# Patient Record
Sex: MALE | Race: WHITE | NOT HISPANIC OR LATINO | URBAN - METROPOLITAN AREA
[De-identification: names, ages, dates, MRNs, and addresses within clinical notes are randomized per-mention and may not be internally consistent; named-entity substitution may affect disease eponyms.]

---

## 2017-01-10 ENCOUNTER — OUTPATIENT (OUTPATIENT)
Dept: OUTPATIENT SERVICES | Facility: HOSPITAL | Age: 82
LOS: 1 days | End: 2017-01-10
Payer: MEDICARE

## 2017-01-10 ENCOUNTER — APPOINTMENT (OUTPATIENT)
Dept: RADIOLOGY | Facility: HOSPITAL | Age: 82
End: 2017-01-10

## 2017-01-10 PROCEDURE — 71046 X-RAY EXAM CHEST 2 VIEWS: CPT

## 2017-01-20 ENCOUNTER — APPOINTMENT (OUTPATIENT)
Dept: CT IMAGING | Facility: HOSPITAL | Age: 82
End: 2017-01-20

## 2017-01-20 ENCOUNTER — OUTPATIENT (OUTPATIENT)
Dept: OUTPATIENT SERVICES | Facility: HOSPITAL | Age: 82
LOS: 1 days | End: 2017-01-20
Payer: MEDICARE

## 2017-01-20 PROCEDURE — 74176 CT ABD & PELVIS W/O CONTRAST: CPT

## 2017-02-07 ENCOUNTER — APPOINTMENT (OUTPATIENT)
Dept: ULTRASOUND IMAGING | Facility: HOSPITAL | Age: 82
End: 2017-02-07

## 2017-02-07 ENCOUNTER — OUTPATIENT (OUTPATIENT)
Dept: OUTPATIENT SERVICES | Facility: HOSPITAL | Age: 82
LOS: 1 days | End: 2017-02-07
Payer: MEDICARE

## 2017-02-07 PROCEDURE — 76775 US EXAM ABDO BACK WALL LIM: CPT

## 2017-02-24 ENCOUNTER — OUTPATIENT (OUTPATIENT)
Dept: OUTPATIENT SERVICES | Facility: HOSPITAL | Age: 82
LOS: 1 days | End: 2017-02-24
Payer: MEDICARE

## 2017-02-24 ENCOUNTER — APPOINTMENT (OUTPATIENT)
Dept: MRI IMAGING | Facility: HOSPITAL | Age: 82
End: 2017-02-24

## 2017-02-24 PROCEDURE — 70551 MRI BRAIN STEM W/O DYE: CPT

## 2017-03-01 ENCOUNTER — APPOINTMENT (OUTPATIENT)
Dept: RADIOLOGY | Facility: HOSPITAL | Age: 82
End: 2017-03-01

## 2017-03-01 ENCOUNTER — OUTPATIENT (OUTPATIENT)
Dept: OUTPATIENT SERVICES | Facility: HOSPITAL | Age: 82
LOS: 1 days | End: 2017-03-01
Payer: MEDICARE

## 2017-03-01 ENCOUNTER — APPOINTMENT (OUTPATIENT)
Dept: MRI IMAGING | Facility: HOSPITAL | Age: 82
End: 2017-03-01

## 2017-03-01 PROCEDURE — 72141 MRI NECK SPINE W/O DYE: CPT

## 2017-03-01 PROCEDURE — 73562 X-RAY EXAM OF KNEE 3: CPT

## 2017-03-07 ENCOUNTER — EMERGENCY (EMERGENCY)
Facility: HOSPITAL | Age: 82
LOS: 1 days | Discharge: HOME | End: 2017-03-07
Admitting: EMERGENCY MEDICINE
Payer: MEDICARE

## 2017-03-07 PROCEDURE — 71101 X-RAY EXAM UNILAT RIBS/CHEST: CPT | Mod: 26

## 2017-03-07 PROCEDURE — 99283 EMERGENCY DEPT VISIT LOW MDM: CPT | Mod: 25

## 2017-03-07 PROCEDURE — 71101 X-RAY EXAM UNILAT RIBS/CHEST: CPT

## 2017-03-07 PROCEDURE — 99284 EMERGENCY DEPT VISIT MOD MDM: CPT

## 2017-03-15 ENCOUNTER — EMERGENCY (EMERGENCY)
Facility: HOSPITAL | Age: 82
LOS: 1 days | Discharge: HOME | End: 2017-03-15
Admitting: EMERGENCY MEDICINE
Payer: MEDICARE

## 2017-03-15 PROCEDURE — 74176 CT ABD & PELVIS W/O CONTRAST: CPT | Mod: 26

## 2017-03-15 PROCEDURE — 99284 EMERGENCY DEPT VISIT MOD MDM: CPT

## 2017-03-16 PROCEDURE — 80048 BASIC METABOLIC PNL TOTAL CA: CPT

## 2017-03-16 PROCEDURE — 85027 COMPLETE CBC AUTOMATED: CPT

## 2017-03-16 PROCEDURE — 82150 ASSAY OF AMYLASE: CPT

## 2017-03-16 PROCEDURE — 85610 PROTHROMBIN TIME: CPT

## 2017-03-16 PROCEDURE — 85730 THROMBOPLASTIN TIME PARTIAL: CPT

## 2017-03-16 PROCEDURE — 80076 HEPATIC FUNCTION PANEL: CPT

## 2017-03-16 PROCEDURE — 74176 CT ABD & PELVIS W/O CONTRAST: CPT

## 2017-03-16 PROCEDURE — 99284 EMERGENCY DEPT VISIT MOD MDM: CPT | Mod: 25

## 2017-03-16 PROCEDURE — 96365 THER/PROPH/DIAG IV INF INIT: CPT

## 2017-03-16 PROCEDURE — 83690 ASSAY OF LIPASE: CPT

## 2017-03-16 PROCEDURE — 96375 TX/PRO/DX INJ NEW DRUG ADDON: CPT

## 2017-03-28 ENCOUNTER — APPOINTMENT (OUTPATIENT)
Dept: RHEUMATOLOGY | Facility: CLINIC | Age: 82
End: 2017-03-28

## 2017-03-30 ENCOUNTER — RESULT REVIEW (OUTPATIENT)
Age: 82
End: 2017-03-30

## 2017-03-31 ENCOUNTER — OUTPATIENT (OUTPATIENT)
Dept: OUTPATIENT SERVICES | Facility: HOSPITAL | Age: 82
LOS: 1 days | End: 2017-03-31
Payer: MEDICARE

## 2017-03-31 DIAGNOSIS — R10.9 UNSPECIFIED ABDOMINAL PAIN: ICD-10-CM

## 2017-03-31 PROCEDURE — 88305 TISSUE EXAM BY PATHOLOGIST: CPT | Mod: 26

## 2017-03-31 PROCEDURE — 43239 EGD BIOPSY SINGLE/MULTIPLE: CPT | Mod: XS

## 2017-03-31 PROCEDURE — 43237 ENDOSCOPIC US EXAM ESOPH: CPT

## 2017-03-31 PROCEDURE — C1726: CPT

## 2017-03-31 PROCEDURE — 88305 TISSUE EXAM BY PATHOLOGIST: CPT

## 2017-04-03 LAB — SURGICAL PATHOLOGY STUDY: SIGNIFICANT CHANGE UP

## 2017-04-04 ENCOUNTER — APPOINTMENT (OUTPATIENT)
Dept: SURGICAL ONCOLOGY | Facility: CLINIC | Age: 82
End: 2017-04-04

## 2017-04-04 ENCOUNTER — FORM ENCOUNTER (OUTPATIENT)
Age: 82
End: 2017-04-04

## 2017-04-04 VITALS
HEART RATE: 73 BPM | DIASTOLIC BLOOD PRESSURE: 73 MMHG | SYSTOLIC BLOOD PRESSURE: 136 MMHG | HEIGHT: 65 IN | WEIGHT: 143 LBS | RESPIRATION RATE: 15 BRPM | BODY MASS INDEX: 23.82 KG/M2

## 2017-04-05 ENCOUNTER — APPOINTMENT (OUTPATIENT)
Dept: NUCLEAR MEDICINE | Facility: IMAGING CENTER | Age: 82
End: 2017-04-05

## 2017-04-05 ENCOUNTER — OUTPATIENT (OUTPATIENT)
Dept: OUTPATIENT SERVICES | Facility: HOSPITAL | Age: 82
LOS: 1 days | End: 2017-04-05
Payer: MEDICARE

## 2017-04-05 DIAGNOSIS — C15.5 MALIGNANT NEOPLASM OF LOWER THIRD OF ESOPHAGUS: ICD-10-CM

## 2017-04-05 PROCEDURE — 78815 PET IMAGE W/CT SKULL-THIGH: CPT

## 2017-04-05 PROCEDURE — A9552: CPT

## 2017-04-11 ENCOUNTER — APPOINTMENT (OUTPATIENT)
Dept: RHEUMATOLOGY | Facility: CLINIC | Age: 82
End: 2017-04-11

## 2017-05-09 ENCOUNTER — APPOINTMENT (OUTPATIENT)
Dept: RHEUMATOLOGY | Facility: CLINIC | Age: 82
End: 2017-05-09

## 2017-06-20 ENCOUNTER — APPOINTMENT (OUTPATIENT)
Dept: SURGICAL ONCOLOGY | Facility: CLINIC | Age: 82
End: 2017-06-20

## 2017-06-20 VITALS
SYSTOLIC BLOOD PRESSURE: 125 MMHG | HEART RATE: 70 BPM | RESPIRATION RATE: 15 BRPM | WEIGHT: 143 LBS | BODY MASS INDEX: 23.82 KG/M2 | DIASTOLIC BLOOD PRESSURE: 71 MMHG | HEIGHT: 65 IN

## 2017-06-20 RX ORDER — PREDNISONE 5 MG/1
5 TABLET ORAL
Qty: 60 | Refills: 0 | Status: ACTIVE | COMMUNITY
Start: 2017-05-05

## 2017-06-20 RX ORDER — EZETIMIBE 10 MG/1
10 TABLET ORAL
Qty: 30 | Refills: 0 | Status: ACTIVE | COMMUNITY
Start: 2017-05-09

## 2017-06-20 RX ORDER — SIMVASTATIN 40 MG/1
40 TABLET, FILM COATED ORAL
Qty: 30 | Refills: 0 | Status: ACTIVE | COMMUNITY
Start: 2017-05-09

## 2017-06-21 ENCOUNTER — FORM ENCOUNTER (OUTPATIENT)
Age: 82
End: 2017-06-21

## 2017-06-22 ENCOUNTER — OUTPATIENT (OUTPATIENT)
Dept: OUTPATIENT SERVICES | Facility: HOSPITAL | Age: 82
LOS: 1 days | End: 2017-06-22
Payer: MEDICARE

## 2017-06-22 ENCOUNTER — APPOINTMENT (OUTPATIENT)
Dept: CARDIOLOGY | Facility: CLINIC | Age: 82
End: 2017-06-22

## 2017-06-22 DIAGNOSIS — R07.89 OTHER CHEST PAIN: ICD-10-CM

## 2017-06-22 DIAGNOSIS — R07.9 CHEST PAIN, UNSPECIFIED: ICD-10-CM

## 2017-06-22 PROCEDURE — 71250 CT THORAX DX C-: CPT

## 2017-06-22 PROCEDURE — 75574 CT ANGIO HRT W/3D IMAGE: CPT

## 2017-06-22 PROCEDURE — 75574 CT ANGIO HRT W/3D IMAGE: CPT | Mod: 26

## 2017-06-22 PROCEDURE — 71250 CT THORAX DX C-: CPT | Mod: 26,59

## 2017-06-29 ENCOUNTER — APPOINTMENT (OUTPATIENT)
Dept: THORACIC SURGERY | Facility: CLINIC | Age: 82
End: 2017-06-29

## 2017-06-29 VITALS
OXYGEN SATURATION: 98 % | HEART RATE: 77 BPM | SYSTOLIC BLOOD PRESSURE: 106 MMHG | WEIGHT: 140 LBS | RESPIRATION RATE: 16 BRPM | HEIGHT: 65 IN | BODY MASS INDEX: 23.32 KG/M2 | DIASTOLIC BLOOD PRESSURE: 63 MMHG

## 2017-06-29 DIAGNOSIS — I10 ESSENTIAL (PRIMARY) HYPERTENSION: ICD-10-CM

## 2017-06-29 DIAGNOSIS — Z87.438 PERSONAL HISTORY OF OTHER DISEASES OF MALE GENITAL ORGANS: ICD-10-CM

## 2017-06-29 DIAGNOSIS — M06.9 RHEUMATOID ARTHRITIS, UNSPECIFIED: ICD-10-CM

## 2017-06-29 DIAGNOSIS — Z82.49 FAMILY HISTORY OF ISCHEMIC HEART DISEASE AND OTHER DISEASES OF THE CIRCULATORY SYSTEM: ICD-10-CM

## 2017-06-29 DIAGNOSIS — Z85.828 OTHER SPECIFIED POSTPROCEDURAL STATES: ICD-10-CM

## 2017-06-29 DIAGNOSIS — Z80.3 FAMILY HISTORY OF MALIGNANT NEOPLASM OF BREAST: ICD-10-CM

## 2017-06-29 DIAGNOSIS — Z98.890 OTHER SPECIFIED POSTPROCEDURAL STATES: ICD-10-CM

## 2017-06-29 DIAGNOSIS — M19.90 UNSPECIFIED OSTEOARTHRITIS, UNSPECIFIED SITE: ICD-10-CM

## 2017-06-30 ENCOUNTER — TRANSCRIPTION ENCOUNTER (OUTPATIENT)
Age: 82
End: 2017-06-30

## 2017-06-30 ENCOUNTER — OUTPATIENT (OUTPATIENT)
Dept: OUTPATIENT SERVICES | Facility: HOSPITAL | Age: 82
LOS: 1 days | End: 2017-06-30
Payer: MEDICARE

## 2017-06-30 VITALS
DIASTOLIC BLOOD PRESSURE: 74 MMHG | HEIGHT: 65 IN | WEIGHT: 139.99 LBS | SYSTOLIC BLOOD PRESSURE: 161 MMHG | RESPIRATION RATE: 14 BRPM | OXYGEN SATURATION: 98 % | TEMPERATURE: 98 F | HEART RATE: 83 BPM

## 2017-06-30 DIAGNOSIS — Z96.649 PRESENCE OF UNSPECIFIED ARTIFICIAL HIP JOINT: Chronic | ICD-10-CM

## 2017-06-30 DIAGNOSIS — R93.1 ABNORMAL FINDINGS ON DIAGNOSTIC IMAGING OF HEART AND CORONARY CIRCULATION: ICD-10-CM

## 2017-06-30 DIAGNOSIS — Z98.890 OTHER SPECIFIED POSTPROCEDURAL STATES: Chronic | ICD-10-CM

## 2017-06-30 DIAGNOSIS — Z95.5 PRESENCE OF CORONARY ANGIOPLASTY IMPLANT AND GRAFT: Chronic | ICD-10-CM

## 2017-06-30 DIAGNOSIS — M12.9 ARTHROPATHY, UNSPECIFIED: Chronic | ICD-10-CM

## 2017-06-30 LAB
ALBUMIN SERPL ELPH-MCNC: 3.5 G/DL — SIGNIFICANT CHANGE UP (ref 3.3–5)
ALP SERPL-CCNC: 233 U/L — HIGH (ref 40–120)
ALT FLD-CCNC: 64 U/L RC — HIGH (ref 10–45)
ANION GAP SERPL CALC-SCNC: 9 MMOL/L — SIGNIFICANT CHANGE UP (ref 5–17)
AST SERPL-CCNC: 51 U/L — HIGH (ref 10–40)
BILIRUB SERPL-MCNC: 0.2 MG/DL — SIGNIFICANT CHANGE UP (ref 0.2–1.2)
BUN SERPL-MCNC: 21 MG/DL — SIGNIFICANT CHANGE UP (ref 7–23)
CALCIUM SERPL-MCNC: 9.9 MG/DL — SIGNIFICANT CHANGE UP (ref 8.4–10.5)
CHLORIDE SERPL-SCNC: 108 MMOL/L — SIGNIFICANT CHANGE UP (ref 96–108)
CO2 SERPL-SCNC: 24 MMOL/L — SIGNIFICANT CHANGE UP (ref 22–31)
CREAT SERPL-MCNC: 0.76 MG/DL — SIGNIFICANT CHANGE UP (ref 0.5–1.3)
GLUCOSE SERPL-MCNC: 96 MG/DL — SIGNIFICANT CHANGE UP (ref 70–99)
HBA1C BLD-MCNC: 5.2 % — SIGNIFICANT CHANGE UP (ref 4–5.6)
HCT VFR BLD CALC: 31.4 % — LOW (ref 39–50)
HGB BLD-MCNC: 10.5 G/DL — LOW (ref 13–17)
MCHC RBC-ENTMCNC: 33.1 PG — SIGNIFICANT CHANGE UP (ref 27–34)
MCHC RBC-ENTMCNC: 33.4 GM/DL — SIGNIFICANT CHANGE UP (ref 32–36)
MCV RBC AUTO: 99.1 FL — SIGNIFICANT CHANGE UP (ref 80–100)
PLATELET # BLD AUTO: 258 K/UL — SIGNIFICANT CHANGE UP (ref 150–400)
POTASSIUM SERPL-MCNC: 4.5 MMOL/L — SIGNIFICANT CHANGE UP (ref 3.5–5.3)
POTASSIUM SERPL-SCNC: 4.5 MMOL/L — SIGNIFICANT CHANGE UP (ref 3.5–5.3)
PROT SERPL-MCNC: 6.9 G/DL — SIGNIFICANT CHANGE UP (ref 6–8.3)
RBC # BLD: 3.17 M/UL — LOW (ref 4.2–5.8)
RBC # FLD: 18.8 % — HIGH (ref 10.3–14.5)
SODIUM SERPL-SCNC: 141 MMOL/L — SIGNIFICANT CHANGE UP (ref 135–145)
WBC # BLD: 7.4 K/UL — SIGNIFICANT CHANGE UP (ref 3.8–10.5)
WBC # FLD AUTO: 7.4 K/UL — SIGNIFICANT CHANGE UP (ref 3.8–10.5)

## 2017-06-30 PROCEDURE — 93010 ELECTROCARDIOGRAM REPORT: CPT

## 2017-06-30 RX ORDER — CITALOPRAM 10 MG/1
20 TABLET, FILM COATED ORAL DAILY
Qty: 0 | Refills: 0 | Status: DISCONTINUED | OUTPATIENT
Start: 2017-06-30 | End: 2017-07-01

## 2017-06-30 RX ORDER — PANTOPRAZOLE SODIUM 20 MG/1
40 TABLET, DELAYED RELEASE ORAL
Qty: 0 | Refills: 0 | Status: DISCONTINUED | OUTPATIENT
Start: 2017-06-30 | End: 2017-07-01

## 2017-06-30 RX ORDER — ASPIRIN/CALCIUM CARB/MAGNESIUM 324 MG
1 TABLET ORAL
Qty: 0 | Refills: 0 | COMMUNITY
Start: 2017-06-30

## 2017-06-30 RX ORDER — TAMSULOSIN HYDROCHLORIDE 0.4 MG/1
0.4 CAPSULE ORAL AT BEDTIME
Qty: 0 | Refills: 0 | Status: DISCONTINUED | OUTPATIENT
Start: 2017-06-30 | End: 2017-07-01

## 2017-06-30 RX ORDER — SUCRALFATE 1 G
1 TABLET ORAL
Qty: 0 | Refills: 0 | Status: DISCONTINUED | OUTPATIENT
Start: 2017-06-30 | End: 2017-07-01

## 2017-06-30 RX ORDER — LOSARTAN POTASSIUM 100 MG/1
25 TABLET, FILM COATED ORAL DAILY
Qty: 0 | Refills: 0 | Status: DISCONTINUED | OUTPATIENT
Start: 2017-06-30 | End: 2017-07-01

## 2017-06-30 RX ORDER — MONTELUKAST 4 MG/1
10 TABLET, CHEWABLE ORAL DAILY
Qty: 0 | Refills: 0 | Status: DISCONTINUED | OUTPATIENT
Start: 2017-06-30 | End: 2017-07-01

## 2017-06-30 RX ORDER — SODIUM CHLORIDE 9 MG/ML
3 INJECTION INTRAMUSCULAR; INTRAVENOUS; SUBCUTANEOUS EVERY 8 HOURS
Qty: 0 | Refills: 0 | Status: DISCONTINUED | OUTPATIENT
Start: 2017-06-30 | End: 2017-07-01

## 2017-06-30 RX ORDER — CLOPIDOGREL BISULFATE 75 MG/1
1 TABLET, FILM COATED ORAL
Qty: 90 | Refills: 3 | OUTPATIENT
Start: 2017-06-30 | End: 2018-06-24

## 2017-06-30 RX ORDER — ASPIRIN AND DIPYRIDAMOLE 25; 200 MG/1; MG/1
1 CAPSULE, EXTENDED RELEASE ORAL
Qty: 0 | Refills: 0 | Status: DISCONTINUED | OUTPATIENT
Start: 2017-06-30 | End: 2017-06-30

## 2017-06-30 RX ORDER — NYSTATIN 500MM UNIT
400000 POWDER (EA) MISCELLANEOUS
Qty: 0 | Refills: 0 | Status: DISCONTINUED | OUTPATIENT
Start: 2017-06-30 | End: 2017-07-01

## 2017-06-30 RX ORDER — ASPIRIN/CALCIUM CARB/MAGNESIUM 324 MG
81 TABLET ORAL DAILY
Qty: 0 | Refills: 0 | Status: DISCONTINUED | OUTPATIENT
Start: 2017-07-01 | End: 2017-07-01

## 2017-06-30 RX ORDER — AMLODIPINE BESYLATE 2.5 MG/1
5 TABLET ORAL DAILY
Qty: 0 | Refills: 0 | Status: DISCONTINUED | OUTPATIENT
Start: 2017-06-30 | End: 2017-07-01

## 2017-06-30 RX ORDER — FINASTERIDE 5 MG/1
5 TABLET, FILM COATED ORAL DAILY
Qty: 0 | Refills: 0 | Status: DISCONTINUED | OUTPATIENT
Start: 2017-06-30 | End: 2017-07-01

## 2017-06-30 RX ORDER — CLOPIDOGREL BISULFATE 75 MG/1
75 TABLET, FILM COATED ORAL DAILY
Qty: 0 | Refills: 0 | Status: DISCONTINUED | OUTPATIENT
Start: 2017-07-01 | End: 2017-07-01

## 2017-06-30 RX ORDER — ALPRAZOLAM 0.25 MG
0.25 TABLET ORAL DAILY
Qty: 0 | Refills: 0 | Status: DISCONTINUED | OUTPATIENT
Start: 2017-06-30 | End: 2017-07-01

## 2017-06-30 RX ORDER — SIMVASTATIN 20 MG/1
40 TABLET, FILM COATED ORAL AT BEDTIME
Qty: 0 | Refills: 0 | Status: DISCONTINUED | OUTPATIENT
Start: 2017-06-30 | End: 2017-07-01

## 2017-06-30 RX ADMIN — FINASTERIDE 5 MILLIGRAM(S): 5 TABLET, FILM COATED ORAL at 18:07

## 2017-06-30 RX ADMIN — Medication 5 MILLIGRAM(S): at 18:07

## 2017-06-30 RX ADMIN — Medication 200 MILLIGRAM(S): at 18:06

## 2017-06-30 RX ADMIN — CITALOPRAM 20 MILLIGRAM(S): 10 TABLET, FILM COATED ORAL at 14:43

## 2017-06-30 RX ADMIN — Medication 1 GRAM(S): at 21:01

## 2017-06-30 RX ADMIN — SIMVASTATIN 40 MILLIGRAM(S): 20 TABLET, FILM COATED ORAL at 18:07

## 2017-06-30 RX ADMIN — Medication 1 GRAM(S): at 18:07

## 2017-06-30 RX ADMIN — TAMSULOSIN HYDROCHLORIDE 0.4 MILLIGRAM(S): 0.4 CAPSULE ORAL at 18:07

## 2017-06-30 RX ADMIN — Medication 400000 UNIT(S): at 18:06

## 2017-06-30 RX ADMIN — LOSARTAN POTASSIUM 25 MILLIGRAM(S): 100 TABLET, FILM COATED ORAL at 18:07

## 2017-06-30 RX ADMIN — AMLODIPINE BESYLATE 5 MILLIGRAM(S): 2.5 TABLET ORAL at 18:07

## 2017-06-30 RX ADMIN — SODIUM CHLORIDE 3 MILLILITER(S): 9 INJECTION INTRAMUSCULAR; INTRAVENOUS; SUBCUTANEOUS at 14:42

## 2017-06-30 RX ADMIN — SODIUM CHLORIDE 3 MILLILITER(S): 9 INJECTION INTRAMUSCULAR; INTRAVENOUS; SUBCUTANEOUS at 21:02

## 2017-06-30 RX ADMIN — MONTELUKAST 10 MILLIGRAM(S): 4 TABLET, CHEWABLE ORAL at 18:07

## 2017-06-30 RX ADMIN — PANTOPRAZOLE SODIUM 40 MILLIGRAM(S): 20 TABLET, DELAYED RELEASE ORAL at 14:46

## 2017-06-30 NOTE — DISCHARGE NOTE ADULT - MEDICATION SUMMARY - MEDICATIONS TO STOP TAKING
I will STOP taking the medications listed below when I get home from the hospital:    Aggrenox 25 mg-200 mg oral capsule, extended release  -- 1 cap(s) by mouth 2 times a day

## 2017-06-30 NOTE — DISCHARGE NOTE ADULT - CARE PROVIDER_API CALL
Emmanuel Eagle), Internal Medicine  [address] [place] [id number]  Phone: [phone]  Fax: [phone]

## 2017-06-30 NOTE — DISCHARGE NOTE ADULT - MEDICATION SUMMARY - MEDICATIONS TO TAKE
I will START or STAY ON the medications listed below when I get home from the hospital:    finasteride 5 mg oral tablet  -- 1 tab(s) by mouth once a day  -- Indication: For Prostate    predniSONE 5 mg oral tablet  -- 1 tab(s) by mouth 2 times a day  -- Indication: For Steroid    aspirin 81 mg oral delayed release tablet  -- 1 tab(s) by mouth once a day  -- Indication: For Coronary Artery Disease    traMADol 50 mg oral tablet  -- 1 tab(s) by mouth 2 times a day  -- Indication: For Pain    olmesartan 5 mg oral tablet  -- 2 tab(s) by mouth once a day  -- Indication: For High Blood Pressure    tamsulosin 0.4 mg oral capsule  -- 1 cap(s) by mouth once a day  -- Indication: For Prostate    Dilantin 100 mg oral capsule  --  by mouth 4 times a day  -- Indication: For Seizure disorder    citalopram 20 mg oral tablet  -- 1 tab(s) by mouth once a day  -- Indication: For Anxiety    nystatin 100,000 units/mL oral suspension  -- 4 milliliter(s) by mouth 4 times a day  -- Indication: For thrush    simvastatin 40 mg oral tablet  -- 1 tab(s) by mouth once a day (at bedtime)  -- Indication: For High Cholesterol     Zetia 10 mg oral tablet  -- 1 tab(s) by mouth once a day  -- Indication: For High Cholesterol    clopidogrel 75 mg oral tablet  -- 1 tab(s) by mouth once a day MDD:1  -- Indication: For Coronary Artery Disease    ALPRAZolam 0.25 mg oral tablet  -- 1 tab(s) by mouth once a day, As Needed  -- Indication: For Anxiety    amLODIPine 5 mg oral tablet  -- 1 tab(s) by mouth once a day  -- Indication: For High Blood Pressure    montelukast 10 mg oral tablet  -- 1 tab(s) by mouth once a day  -- Indication: For Ashtma    sucralfate 1 g oral tablet  -- 1 tab(s) by mouth 4 times a day (before meals and at bedtime)  -- Indication: For GERD    omeprazole 40 mg oral delayed release capsule  -- 1 cap(s) by mouth 2 times a day  -- Indication: For GERD    folic acid 0.4 mg oral tablet  -- 1 tab(s) by mouth once a day  -- Indication: For Supplement    Vitamin B12 Methylcobalamin 5000 mcg sublingual tablet  --  under tongue   -- Indication: For Supplement    Vitamin B6 100 mg oral tablet  -- 1 tab(s) by mouth once a day  -- Indication: For Supplement

## 2017-06-30 NOTE — DISCHARGE NOTE ADULT - PLAN OF CARE
Low salt, low fat diet.   Weight management.   Take medications as prescribed.    No smoking.  Follow up appointments with your doctor(s)  as instructed. No heavy lifting for 2 weeks, no strenuous activity  ( pushing/ pulling) no driving for x 2 days,  you may shower 24 hours following procedure but no bathing or swimming for x1  week, no strenuous sex for x 1 week & follow up with your cardiologist in 1-2 week Your blood pressure will be controlled. Continue with your blood pressure medications; eat a heart healthy diet with low salt diet; exercise regularly (consult with your physician or cardiologist first); maintain a heart healthy weight; if you smoke - quit (A resource to help you stop smoking is the [facility] – phone number [phone].); include healthy ways to manage stress. Continue to follow with your primary care physician or cardiologist. Your LDL cholesterol will be less than 70mg/dL Continue with your cholesterol medications. Eat a heart healthy diet that is low in saturated fats and salt, and includes whole grains, fruits, vegetables and lean protein; exercise regularly (consult with your physician or cardiologist first); maintain a heart healthy weight; if you smoke - quit (A resource to help you stop smoking is the [facility] Tobacco Control – phone number [phone].). Continue to follow with your primary physician or cardiologist.

## 2017-06-30 NOTE — DISCHARGE NOTE ADULT - CARE PLAN
Principal Discharge DX:	Coronary artery disease  Goal:	Low salt, low fat diet.   Weight management.   Take medications as prescribed.    No smoking.  Follow up appointments with your doctor(s)  as instructed.  Instructions for follow-up, activity and diet:	No heavy lifting for 2 weeks, no strenuous activity  ( pushing/ pulling) no driving for x 2 days,  you may shower 24 hours following procedure but no bathing or swimming for x1  week, no strenuous sex for x 1 week & follow up with your cardiologist in 1-2 week  Secondary Diagnosis:	Essential hypertension  Goal:	Your blood pressure will be controlled.  Instructions for follow-up, activity and diet:	Continue with your blood pressure medications; eat a heart healthy diet with low salt diet; exercise regularly (consult with your physician or cardiologist first); maintain a heart healthy weight; if you smoke - quit (A resource to help you stop smoking is the [facility] [employer] for [employer] Control – phone number [phone].); include healthy ways to manage stress. Continue to follow with your primary care physician or cardiologist.  Secondary Diagnosis:	Hyperlipidemia, unspecified hyperlipidemia type  Goal:	Your LDL cholesterol will be less than 70mg/dL  Instructions for follow-up, activity and diet:	Continue with your cholesterol medications. Eat a heart healthy diet that is low in saturated fats and salt, and includes whole grains, fruits, vegetables and lean protein; exercise regularly (consult with your physician or cardiologist first); maintain a heart healthy weight; if you smoke - quit (A resource to help you stop smoking is the [facility] [employer] – phone number [phone].). Continue to follow with your primary physician or cardiologist. Principal Discharge DX:	Coronary artery disease  Goal:	Low salt, low fat diet.   Weight management.   Take medications as prescribed.    No smoking.  Follow up appointments with your doctor(s)  as instructed.  Instructions for follow-up, activity and diet:	No heavy lifting for 2 weeks, no strenuous activity  ( pushing/ pulling) no driving for x 2 days,  you may shower 24 hours following procedure but no bathing or swimming for x1  week, no strenuous sex for x 1 week & follow up with your cardiologist in 1-2 week  Secondary Diagnosis:	Essential hypertension  Goal:	Your blood pressure will be controlled.  Instructions for follow-up, activity and diet:	Continue with your blood pressure medications; eat a heart healthy diet with low salt diet; exercise regularly (consult with your physician or cardiologist first); maintain a heart healthy weight; if you smoke - quit (A resource to help you stop smoking is the [facility] [employer] for [employer] Control – phone number [phone].); include healthy ways to manage stress. Continue to follow with your primary care physician or cardiologist.  Secondary Diagnosis:	Hyperlipidemia, unspecified hyperlipidemia type  Goal:	Your LDL cholesterol will be less than 70mg/dL  Instructions for follow-up, activity and diet:	Continue with your cholesterol medications. Eat a heart healthy diet that is low in saturated fats and salt, and includes whole grains, fruits, vegetables and lean protein; exercise regularly (consult with your physician or cardiologist first); maintain a heart healthy weight; if you smoke - quit (A resource to help you stop smoking is the [facility] [employer] – phone number [phone].). Continue to follow with your primary physician or cardiologist. Principal Discharge DX:	Coronary artery disease  Goal:	Low salt, low fat diet.   Weight management.   Take medications as prescribed.    No smoking.  Follow up appointments with your doctor(s)  as instructed.  Instructions for follow-up, activity and diet:	No heavy lifting for 2 weeks, no strenuous activity  ( pushing/ pulling) no driving for x 2 days,  you may shower 24 hours following procedure but no bathing or swimming for x1  week, no strenuous sex for x 1 week & follow up with your cardiologist in 1-2 week  Secondary Diagnosis:	Essential hypertension  Goal:	Your blood pressure will be controlled.  Instructions for follow-up, activity and diet:	Continue with your blood pressure medications; eat a heart healthy diet with low salt diet; exercise regularly (consult with your physician or cardiologist first); maintain a heart healthy weight; if you smoke - quit (A resource to help you stop smoking is the [facility] [employer] for [employer] Control – phone number [phone].); include healthy ways to manage stress. Continue to follow with your primary care physician or cardiologist.  Secondary Diagnosis:	Hyperlipidemia, unspecified hyperlipidemia type  Goal:	Your LDL cholesterol will be less than 70mg/dL  Instructions for follow-up, activity and diet:	Continue with your cholesterol medications. Eat a heart healthy diet that is low in saturated fats and salt, and includes whole grains, fruits, vegetables and lean protein; exercise regularly (consult with your physician or cardiologist first); maintain a heart healthy weight; if you smoke - quit (A resource to help you stop smoking is the [facility] [place] – phone number [phone].). Continue to follow with your primary physician or cardiologist.

## 2017-06-30 NOTE — DISCHARGE NOTE ADULT - HOSPITAL COURSE
82 yo male PMH CAD, HTN, HLD, Hx CVA (no residual, 2000's- no activity since) with subsequent seizure disorder (started on Dilantin- no seizure activity since), BCC on the skin, controlled asthma (never intubated), former smoker, RA, OA (right knee), [place], esophageal CA (treated with chemo and radiation- last tx in May, EGD with bx: negative). Patient reports required cardiac clearance for pending esophagectomy with Dr. JOSE Cohen (concerns for reoccurring CA).  Patient seen and evaluated by Dr. Leif Santiago ([facility]), referred for CT coronaries/chest prior to Sx. CT reveals calculated Agatston score is 401, , Lcx 96, , right lung nodule unchanged since 6/11/15. Patient currently denies chest pain, palpitations, PND, orthopnea, endorses RLE edema.  Patient is now s/p PCI BMS x 3 to pLAD via RRA access on 6/30/2017.  He tolerated the procedure well and post cath EKG is without acute changes.  RRA site benign without presence of bleeding/hematoma, patient is without complaints.  Case discussed with Dr. Reid, Aggrenox discontinued, patient to continue ASA/Plavix/Statin.  His hospital course was otherwise uneventful and he remains hemodynamically stable.  Patient is now medically stable for discharge home today.

## 2017-06-30 NOTE — H&P CARDIOLOGY - PMH
Anxiety    Asthma    BCC (basal cell carcinoma)  skin  BPH (benign prostatic hyperplasia)    Cerebrovascular accident (CVA)    Chronic allergic rhinitis    Esophagus cancer    Former smoker    Former smoker, stopped smoking in distant past    HTN (hypertension)    Hyperlipidemia    OA (osteoarthritis) of knee  right  Rheumatoid arthritis    Seizure disorder

## 2017-06-30 NOTE — H&P CARDIOLOGY - HISTORY OF PRESENT ILLNESS
82 yo male PMH CAD, HTN, HLD, Hx CVA (no residual, 2000's- no activity since) with subsequent seizure disorder (started on Dilantin- no seizure activity since), BCC on the skin, controlled asthma (never intubated), former smoker, RA, OA (right knee), esophageal CA (treated with chemo and radiation- last tx in May, EGD with bx: negative). Patient reports required cardiac clearance for pending esophagectomy with Dr. JOSE Cohen (concerns for reoccurring CA).  Patient seen and evaluated by Dr. Leif Santiago (cards), referred for CT coronaries/chest prior to Sx. CT reveals calculated agatston score is 401, , Lcx 96, , right lung nodule unchanged since 6/11/15. Patient currently denies chest pain, palpitations, PND, othropnea. Admits to RLE edema,     Surgery oncology: Ganesh Cohen  CT surgery: Christian Lacey: GI 82 yo male PMH CAD, HTN, HLD, Hx CVA (no residual, 2000's- no activity since) with subsequent seizure disorder (started on Dilantin- no seizure activity since), BCC on the skin, controlled asthma (never intubated), former smoker, RA, OA (right knee), [place], esophageal CA (treated with chemo and radiation- last tx in May, EGD with bx: negative). Patient reports required cardiac clearance for pending esophagectomy with Dr. JOSE Cohen (concerns for reoccurring CA).  Patient seen and evaluated by Dr. Leif Santiago (cards), referred for CT coronaries/chest prior to Sx. CT reveals calculated agatston score is 401, , Lcx 96, , right lung nodule unchanged since 6/11/15. Patient currently denies chest pain, palpitations, PND, othropnea. Admits to RLE edema,     Surgery oncology: Ganesh Cohen  CT surgery: Christian Lacey: GI

## 2017-06-30 NOTE — DISCHARGE NOTE ADULT - PATIENT PORTAL LINK FT
“You can access the Follow[facility] Patient Portal, offered by [facility], by registering with the following website: [email or web address]”

## 2017-06-30 NOTE — H&P CARDIOLOGY - FAMILY HISTORY
Mother  Still living? Unknown  FH: CAD (coronary artery disease), Age at diagnosis: Age Unknown     Father  Still living? Unknown  Family history of pulmonary embolism, Age at diagnosis: Age Unknown

## 2017-07-01 VITALS
DIASTOLIC BLOOD PRESSURE: 75 MMHG | HEART RATE: 85 BPM | TEMPERATURE: 98 F | SYSTOLIC BLOOD PRESSURE: 157 MMHG | OXYGEN SATURATION: 98 % | RESPIRATION RATE: 17 BRPM

## 2017-07-01 LAB
ANION GAP SERPL CALC-SCNC: 13 MMOL/L — SIGNIFICANT CHANGE UP (ref 5–17)
BASOPHILS # BLD AUTO: 0 K/UL — SIGNIFICANT CHANGE UP (ref 0–0.2)
BASOPHILS NFR BLD AUTO: 0.5 % — SIGNIFICANT CHANGE UP (ref 0–2)
BUN SERPL-MCNC: 19 MG/DL — SIGNIFICANT CHANGE UP (ref 7–23)
CALCIUM SERPL-MCNC: 9.1 MG/DL — SIGNIFICANT CHANGE UP (ref 8.4–10.5)
CHLORIDE SERPL-SCNC: 105 MMOL/L — SIGNIFICANT CHANGE UP (ref 96–108)
CO2 SERPL-SCNC: 21 MMOL/L — LOW (ref 22–31)
CREAT SERPL-MCNC: 0.82 MG/DL — SIGNIFICANT CHANGE UP (ref 0.5–1.3)
EOSINOPHIL # BLD AUTO: 0.1 K/UL — SIGNIFICANT CHANGE UP (ref 0–0.5)
EOSINOPHIL NFR BLD AUTO: 2.1 % — SIGNIFICANT CHANGE UP (ref 0–6)
GLUCOSE SERPL-MCNC: 96 MG/DL — SIGNIFICANT CHANGE UP (ref 70–99)
HCT VFR BLD CALC: 29.2 % — LOW (ref 39–50)
HGB BLD-MCNC: 10.3 G/DL — LOW (ref 13–17)
LYMPHOCYTES # BLD AUTO: 1.5 K/UL — SIGNIFICANT CHANGE UP (ref 1–3.3)
LYMPHOCYTES # BLD AUTO: 21.2 % — SIGNIFICANT CHANGE UP (ref 13–44)
MCHC RBC-ENTMCNC: 34.7 PG — HIGH (ref 27–34)
MCHC RBC-ENTMCNC: 35.3 GM/DL — SIGNIFICANT CHANGE UP (ref 32–36)
MCV RBC AUTO: 98.4 FL — SIGNIFICANT CHANGE UP (ref 80–100)
MONOCYTES # BLD AUTO: 0.8 K/UL — SIGNIFICANT CHANGE UP (ref 0–0.9)
MONOCYTES NFR BLD AUTO: 12 % — SIGNIFICANT CHANGE UP (ref 2–14)
NEUTROPHILS # BLD AUTO: 4.4 K/UL — SIGNIFICANT CHANGE UP (ref 1.8–7.4)
NEUTROPHILS NFR BLD AUTO: 64.2 % — SIGNIFICANT CHANGE UP (ref 43–77)
PLATELET # BLD AUTO: 231 K/UL — SIGNIFICANT CHANGE UP (ref 150–400)
POTASSIUM SERPL-MCNC: 3.8 MMOL/L — SIGNIFICANT CHANGE UP (ref 3.5–5.3)
POTASSIUM SERPL-SCNC: 3.8 MMOL/L — SIGNIFICANT CHANGE UP (ref 3.5–5.3)
RBC # BLD: 2.97 M/UL — LOW (ref 4.2–5.8)
RBC # FLD: 18.5 % — HIGH (ref 10.3–14.5)
SODIUM SERPL-SCNC: 139 MMOL/L — SIGNIFICANT CHANGE UP (ref 135–145)
WBC # BLD: 6.9 K/UL — SIGNIFICANT CHANGE UP (ref 3.8–10.5)
WBC # FLD AUTO: 6.9 K/UL — SIGNIFICANT CHANGE UP (ref 3.8–10.5)

## 2017-07-01 PROCEDURE — 83036 HEMOGLOBIN GLYCOSYLATED A1C: CPT

## 2017-07-01 PROCEDURE — C1769: CPT

## 2017-07-01 PROCEDURE — 80048 BASIC METABOLIC PNL TOTAL CA: CPT

## 2017-07-01 PROCEDURE — 85027 COMPLETE CBC AUTOMATED: CPT

## 2017-07-01 PROCEDURE — 80053 COMPREHEN METABOLIC PANEL: CPT

## 2017-07-01 PROCEDURE — C1894: CPT

## 2017-07-01 PROCEDURE — C1887: CPT

## 2017-07-01 PROCEDURE — 92928 PRQ TCAT PLMT NTRAC ST 1 LES: CPT | Mod: LD

## 2017-07-01 PROCEDURE — 93454 CORONARY ARTERY ANGIO S&I: CPT | Mod: 59

## 2017-07-01 PROCEDURE — C1876: CPT

## 2017-07-01 PROCEDURE — 93010 ELECTROCARDIOGRAM REPORT: CPT

## 2017-07-01 PROCEDURE — 93005 ELECTROCARDIOGRAM TRACING: CPT

## 2017-07-01 RX ADMIN — Medication 200 MILLIGRAM(S): at 06:00

## 2017-07-01 RX ADMIN — Medication 81 MILLIGRAM(S): at 06:01

## 2017-07-01 RX ADMIN — PANTOPRAZOLE SODIUM 40 MILLIGRAM(S): 20 TABLET, DELAYED RELEASE ORAL at 06:00

## 2017-07-01 RX ADMIN — Medication 5 MILLIGRAM(S): at 06:00

## 2017-07-01 RX ADMIN — SODIUM CHLORIDE 3 MILLILITER(S): 9 INJECTION INTRAMUSCULAR; INTRAVENOUS; SUBCUTANEOUS at 06:06

## 2017-07-01 RX ADMIN — CLOPIDOGREL BISULFATE 75 MILLIGRAM(S): 75 TABLET, FILM COATED ORAL at 06:00

## 2017-07-01 RX ADMIN — Medication 1 GRAM(S): at 06:00

## 2017-07-01 RX ADMIN — Medication 400000 UNIT(S): at 06:01

## 2017-07-01 RX ADMIN — CITALOPRAM 20 MILLIGRAM(S): 10 TABLET, FILM COATED ORAL at 06:00

## 2017-07-01 RX ADMIN — Medication 400000 UNIT(S): at 01:26

## 2017-07-01 NOTE — PROGRESS NOTE ADULT - SUBJECTIVE AND OBJECTIVE BOX
Patient is a 82y old  Male who presents with a chief complaint of elective angiogram (2017 16:50). [facility] pLAD 90%  BMS X 3          Allergies    penicillin (Rash)    Intolerances        Medications:  sodium chloride 0.9% lock flush 3 milliLiter(s) IV Push every 8 hours  aspirin enteric coated 81 milliGRAM(s) Oral daily  clopidogrel Tablet 75 milliGRAM(s) Oral daily  finasteride 5 milliGRAM(s) Oral daily  predniSONE   Tablet 5 milliGRAM(s) Oral two times a day  losartan 25 milliGRAM(s) Oral daily  tamsulosin 0.4 milliGRAM(s) Oral at bedtime  citalopram 20 milliGRAM(s) Oral daily  nystatin    Suspension 204110 Unit(s) Oral four times a day  simvastatin 40 milliGRAM(s) Oral at bedtime  ALPRAZolam 0.25 milliGRAM(s) Oral daily PRN  amLODIPine   Tablet 5 milliGRAM(s) Oral daily  montelukast 10 milliGRAM(s) Oral daily  sucralfate 1 Gram(s) Oral Before meals and at bedtime  pantoprazole    Tablet 40 milliGRAM(s) Oral before breakfast  phenytoin   Capsule 200 milliGRAM(s) Oral two times a day      Vitals:  T(C): 36.4 (17 @ 00:27), Max: 36.7 (17 @ 19:10)  HR: 85 (17 @ 00:27) (52 - 85)  BP: 157/75 (17 @ 00:27) (108/56 - 162/74)  BP(mean): 103 (17 @ 09:53) (103 - 103)  RR: 17 (17 @ 00:27) (14 - 20)  SpO2: 98% (17 @ 00:27) (95% - 100%)  Wt(kg): --  Daily Height in cm: 165.1 (2017 14:10)    Daily Weight in k.5 (2017 09:53)  I&O's Summary    2017 07:  -  2017 07:00  --------------------------------------------------------  IN: 360 mL / OUT: 0 mL / NET: 360 mL          Physical Exam:  Appearance: Normal  Eyes: PERRL, EOMI  HENT: Normal oral [place], NC/AT  Cardiovascular: S1S2, RRR, No M/R/G, no JVD, No Lower extremity edema  Procedural Access Site: No hematoma, Non-tender to palpation, 2+ pulse, No bruit, No Ecchymosis  Respiratory: Clear to auscultation bilaterally  Gastrointestinal: Soft, Non tender, Normal Bowel Sounds  Musculoskeletal: No clubbing, No joint deformity   Neurologic: Non-focal  Lymphatic: No lymphadenopathy  Psychiatry: AAOx3, Mood & affect appropriate  Skin: No rashes, No ecchymoses, No cyanosis        139  |  105  |  19  ----------------------------<  96  3.8   |  21<L>  |  0.82    Ca    9.1      2017 01:31    TPro  6.9  /  Alb  3.5  /  TBili  0.2  /  DBili  x   /  AST  51<H>  /  ALT  64<H>  /  AlkPhos  233<H>              Lipid panel   Hgb A1c Hemoglobin A1C, Whole Blood: 5.2 % ( @ 19:48)          ECG:    Echo:    Stress Testing:     Cath:    Imaging:    Interpretation of Telemetry:

## 2017-07-05 ENCOUNTER — MESSAGE (OUTPATIENT)
Age: 82
End: 2017-07-05

## 2017-07-06 RX ORDER — ASPIRIN AND DIPYRIDAMOLE 25; 200 MG/1; MG/1
1 CAPSULE, EXTENDED RELEASE ORAL
Qty: 0 | Refills: 0 | COMMUNITY

## 2017-07-10 ENCOUNTER — FORM ENCOUNTER (OUTPATIENT)
Age: 82
End: 2017-07-10

## 2017-07-11 ENCOUNTER — APPOINTMENT (OUTPATIENT)
Dept: NUCLEAR MEDICINE | Facility: IMAGING CENTER | Age: 82
End: 2017-07-11

## 2017-07-11 ENCOUNTER — OUTPATIENT (OUTPATIENT)
Dept: OUTPATIENT SERVICES | Facility: HOSPITAL | Age: 82
LOS: 1 days | End: 2017-07-11
Payer: MEDICARE

## 2017-07-11 DIAGNOSIS — C15.5 MALIGNANT NEOPLASM OF LOWER THIRD OF ESOPHAGUS: ICD-10-CM

## 2017-07-11 DIAGNOSIS — Z96.649 PRESENCE OF UNSPECIFIED ARTIFICIAL HIP JOINT: Chronic | ICD-10-CM

## 2017-07-11 DIAGNOSIS — Z98.890 OTHER SPECIFIED POSTPROCEDURAL STATES: Chronic | ICD-10-CM

## 2017-07-11 DIAGNOSIS — Z95.5 PRESENCE OF CORONARY ANGIOPLASTY IMPLANT AND GRAFT: Chronic | ICD-10-CM

## 2017-07-11 DIAGNOSIS — M12.9 ARTHROPATHY, UNSPECIFIED: Chronic | ICD-10-CM

## 2017-07-11 PROBLEM — I63.9 CEREBRAL INFARCTION, UNSPECIFIED: Chronic | Status: ACTIVE | Noted: 2017-06-30

## 2017-07-11 PROBLEM — F41.9 ANXIETY DISORDER, UNSPECIFIED: Chronic | Status: ACTIVE | Noted: 2017-06-30

## 2017-07-11 PROBLEM — N40.0 BENIGN PROSTATIC HYPERPLASIA WITHOUT LOWER URINARY TRACT SYMPTOMS: Chronic | Status: ACTIVE | Noted: 2017-06-30

## 2017-07-11 PROBLEM — I10 ESSENTIAL (PRIMARY) HYPERTENSION: Chronic | Status: ACTIVE | Noted: 2017-06-30

## 2017-07-11 PROBLEM — Z87.891 PERSONAL HISTORY OF NICOTINE DEPENDENCE: Chronic | Status: ACTIVE | Noted: 2017-06-30

## 2017-07-11 PROBLEM — C15.9 MALIGNANT NEOPLASM OF ESOPHAGUS, UNSPECIFIED: Chronic | Status: ACTIVE | Noted: 2017-06-30

## 2017-07-11 PROBLEM — C44.91 BASAL CELL CARCINOMA OF SKIN, UNSPECIFIED: Chronic | Status: ACTIVE | Noted: 2017-06-30

## 2017-07-11 PROBLEM — J45.909 UNSPECIFIED ASTHMA, UNCOMPLICATED: Chronic | Status: ACTIVE | Noted: 2017-06-30

## 2017-07-11 PROBLEM — J30.9 ALLERGIC RHINITIS, UNSPECIFIED: Chronic | Status: ACTIVE | Noted: 2017-06-30

## 2017-07-11 PROBLEM — E78.5 HYPERLIPIDEMIA, UNSPECIFIED: Chronic | Status: ACTIVE | Noted: 2017-06-30

## 2017-07-11 PROBLEM — M17.10 UNILATERAL PRIMARY OSTEOARTHRITIS, UNSPECIFIED KNEE: Chronic | Status: ACTIVE | Noted: 2017-06-30

## 2017-07-11 PROBLEM — M06.9 RHEUMATOID ARTHRITIS, UNSPECIFIED: Chronic | Status: ACTIVE | Noted: 2017-06-30

## 2017-07-11 PROCEDURE — 78815 PET IMAGE W/CT SKULL-THIGH: CPT | Mod: 26,PS

## 2017-07-11 PROCEDURE — 78815 PET IMAGE W/CT SKULL-THIGH: CPT

## 2017-07-11 PROCEDURE — A9552: CPT

## 2017-07-31 ENCOUNTER — MESSAGE (OUTPATIENT)
Age: 82
End: 2017-07-31

## 2017-08-04 RX ORDER — PANTOPRAZOLE SODIUM 20 MG/1
1 TABLET, DELAYED RELEASE ORAL
Qty: 0 | Refills: 0 | COMMUNITY
Start: 2017-08-04

## 2017-08-06 ENCOUNTER — INPATIENT (INPATIENT)
Facility: HOSPITAL | Age: 82
LOS: 1 days | Discharge: HOME | DRG: 251 | End: 2017-08-08
Attending: INTERNAL MEDICINE | Admitting: INTERNAL MEDICINE
Payer: MEDICARE

## 2017-08-06 VITALS
SYSTOLIC BLOOD PRESSURE: 165 MMHG | TEMPERATURE: 98 F | RESPIRATION RATE: 20 BRPM | DIASTOLIC BLOOD PRESSURE: 77 MMHG | HEART RATE: 78 BPM | OXYGEN SATURATION: 96 %

## 2017-08-06 DIAGNOSIS — I10 ESSENTIAL (PRIMARY) HYPERTENSION: ICD-10-CM

## 2017-08-06 DIAGNOSIS — M12.9 ARTHROPATHY, UNSPECIFIED: Chronic | ICD-10-CM

## 2017-08-06 DIAGNOSIS — Z95.5 PRESENCE OF CORONARY ANGIOPLASTY IMPLANT AND GRAFT: Chronic | ICD-10-CM

## 2017-08-06 DIAGNOSIS — Z96.649 PRESENCE OF UNSPECIFIED ARTIFICIAL HIP JOINT: Chronic | ICD-10-CM

## 2017-08-06 DIAGNOSIS — E78.5 HYPERLIPIDEMIA, UNSPECIFIED: ICD-10-CM

## 2017-08-06 DIAGNOSIS — R07.9 CHEST PAIN, UNSPECIFIED: ICD-10-CM

## 2017-08-06 DIAGNOSIS — Z29.9 ENCOUNTER FOR PROPHYLACTIC MEASURES, UNSPECIFIED: ICD-10-CM

## 2017-08-06 DIAGNOSIS — Z98.890 OTHER SPECIFIED POSTPROCEDURAL STATES: Chronic | ICD-10-CM

## 2017-08-06 DIAGNOSIS — I20.0 UNSTABLE ANGINA: ICD-10-CM

## 2017-08-06 LAB
ALBUMIN SERPL ELPH-MCNC: 3.5 G/DL — SIGNIFICANT CHANGE UP (ref 3.3–5)
ALP SERPL-CCNC: 191 U/L — HIGH (ref 40–120)
ALT FLD-CCNC: 49 U/L RC — HIGH (ref 10–45)
ANION GAP SERPL CALC-SCNC: 12 MMOL/L — SIGNIFICANT CHANGE UP (ref 5–17)
APTT BLD: 30.2 SEC — SIGNIFICANT CHANGE UP (ref 27.5–37.4)
AST SERPL-CCNC: 48 U/L — HIGH (ref 10–40)
BASE EXCESS BLDV CALC-SCNC: 2 MMOL/L — SIGNIFICANT CHANGE UP (ref -2–2)
BASOPHILS # BLD AUTO: 0 K/UL — SIGNIFICANT CHANGE UP (ref 0–0.2)
BASOPHILS NFR BLD AUTO: 0.5 % — SIGNIFICANT CHANGE UP (ref 0–2)
BILIRUB SERPL-MCNC: 0.1 MG/DL — LOW (ref 0.2–1.2)
BUN SERPL-MCNC: 21 MG/DL — SIGNIFICANT CHANGE UP (ref 7–23)
CA-I SERPL-SCNC: 1.27 MMOL/L — SIGNIFICANT CHANGE UP (ref 1.12–1.3)
CALCIUM SERPL-MCNC: 9.5 MG/DL — SIGNIFICANT CHANGE UP (ref 8.4–10.5)
CHLORIDE BLDV-SCNC: 112 MMOL/L — HIGH (ref 96–108)
CHLORIDE SERPL-SCNC: 108 MMOL/L — SIGNIFICANT CHANGE UP (ref 96–108)
CK MB BLD-MCNC: 5.7 % — HIGH (ref 0–3.5)
CK MB CFR SERPL CALC: 3.6 NG/ML — SIGNIFICANT CHANGE UP (ref 0–6.7)
CK SERPL-CCNC: 63 U/L — SIGNIFICANT CHANGE UP (ref 30–200)
CO2 BLDV-SCNC: 29 MMOL/L — SIGNIFICANT CHANGE UP (ref 22–30)
CO2 SERPL-SCNC: 23 MMOL/L — SIGNIFICANT CHANGE UP (ref 22–31)
CREAT SERPL-MCNC: 0.99 MG/DL — SIGNIFICANT CHANGE UP (ref 0.5–1.3)
EOSINOPHIL # BLD AUTO: 0.2 K/UL — SIGNIFICANT CHANGE UP (ref 0–0.5)
EOSINOPHIL NFR BLD AUTO: 3.4 % — SIGNIFICANT CHANGE UP (ref 0–6)
GAS PNL BLDV: 141 MMOL/L — SIGNIFICANT CHANGE UP (ref 136–145)
GAS PNL BLDV: SIGNIFICANT CHANGE UP
GAS PNL BLDV: SIGNIFICANT CHANGE UP
GLUCOSE BLDV-MCNC: 122 MG/DL — HIGH (ref 70–99)
GLUCOSE SERPL-MCNC: 122 MG/DL — HIGH (ref 70–99)
HCO3 BLDV-SCNC: 28 MMOL/L — SIGNIFICANT CHANGE UP (ref 21–29)
HCT VFR BLD CALC: 31.7 % — LOW (ref 39–50)
HCT VFR BLDA CALC: 33 % — LOW (ref 39–50)
HGB BLD CALC-MCNC: 10.7 G/DL — LOW (ref 13–17)
HGB BLD-MCNC: 10.5 G/DL — LOW (ref 13–17)
INR BLD: 0.97 RATIO — SIGNIFICANT CHANGE UP (ref 0.88–1.16)
LACTATE BLDV-MCNC: 0.8 MMOL/L — SIGNIFICANT CHANGE UP (ref 0.7–2)
LYMPHOCYTES # BLD AUTO: 0.9 K/UL — LOW (ref 1–3.3)
LYMPHOCYTES # BLD AUTO: 13.3 % — SIGNIFICANT CHANGE UP (ref 13–44)
MCHC RBC-ENTMCNC: 33.2 GM/DL — SIGNIFICANT CHANGE UP (ref 32–36)
MCHC RBC-ENTMCNC: 33.8 PG — SIGNIFICANT CHANGE UP (ref 27–34)
MCV RBC AUTO: 102 FL — HIGH (ref 80–100)
MONOCYTES # BLD AUTO: 0.7 K/UL — SIGNIFICANT CHANGE UP (ref 0–0.9)
MONOCYTES NFR BLD AUTO: 10.5 % — SIGNIFICANT CHANGE UP (ref 2–14)
NEUTROPHILS # BLD AUTO: 4.8 K/UL — SIGNIFICANT CHANGE UP (ref 1.8–7.4)
NEUTROPHILS NFR BLD AUTO: 72.3 % — SIGNIFICANT CHANGE UP (ref 43–77)
PCO2 BLDV: 50 MMHG — SIGNIFICANT CHANGE UP (ref 35–50)
PH BLDV: 7.36 — SIGNIFICANT CHANGE UP (ref 7.35–7.45)
PHENYTOIN FREE SERPL-MCNC: 12.7 UG/ML — SIGNIFICANT CHANGE UP (ref 10–20)
PLATELET # BLD AUTO: 193 K/UL — SIGNIFICANT CHANGE UP (ref 150–400)
PO2 BLDV: 28 MMHG — SIGNIFICANT CHANGE UP (ref 25–45)
POTASSIUM BLDV-SCNC: 3.7 MMOL/L — SIGNIFICANT CHANGE UP (ref 3.5–5)
POTASSIUM SERPL-MCNC: 4 MMOL/L — SIGNIFICANT CHANGE UP (ref 3.5–5.3)
POTASSIUM SERPL-SCNC: 4 MMOL/L — SIGNIFICANT CHANGE UP (ref 3.5–5.3)
PROT SERPL-MCNC: 7.1 G/DL — SIGNIFICANT CHANGE UP (ref 6–8.3)
PROTHROM AB SERPL-ACNC: 10.5 SEC — SIGNIFICANT CHANGE UP (ref 9.8–12.7)
RBC # BLD: 3.11 M/UL — LOW (ref 4.2–5.8)
RBC # FLD: 13 % — SIGNIFICANT CHANGE UP (ref 10.3–14.5)
SAO2 % BLDV: 41 % — LOW (ref 67–88)
SODIUM SERPL-SCNC: 143 MMOL/L — SIGNIFICANT CHANGE UP (ref 135–145)
TROPONIN T SERPL-MCNC: 0.02 NG/ML — SIGNIFICANT CHANGE UP (ref 0–0.06)
TROPONIN T SERPL-MCNC: 0.02 NG/ML — SIGNIFICANT CHANGE UP (ref 0–0.06)
WBC # BLD: 6.6 K/UL — SIGNIFICANT CHANGE UP (ref 3.8–10.5)
WBC # FLD AUTO: 6.6 K/UL — SIGNIFICANT CHANGE UP (ref 3.8–10.5)

## 2017-08-06 PROCEDURE — 93971 EXTREMITY STUDY: CPT | Mod: 26

## 2017-08-06 PROCEDURE — 71275 CT ANGIOGRAPHY CHEST: CPT | Mod: 26

## 2017-08-06 PROCEDURE — 71020: CPT | Mod: 26

## 2017-08-06 PROCEDURE — 93010 ELECTROCARDIOGRAM REPORT: CPT

## 2017-08-06 PROCEDURE — 99285 EMERGENCY DEPT VISIT HI MDM: CPT | Mod: 25

## 2017-08-06 RX ORDER — PANTOPRAZOLE SODIUM 20 MG/1
40 TABLET, DELAYED RELEASE ORAL
Qty: 0 | Refills: 0 | Status: DISCONTINUED | OUTPATIENT
Start: 2017-08-06 | End: 2017-08-08

## 2017-08-06 RX ORDER — SIMVASTATIN 20 MG/1
40 TABLET, FILM COATED ORAL AT BEDTIME
Qty: 0 | Refills: 0 | Status: DISCONTINUED | OUTPATIENT
Start: 2017-08-06 | End: 2017-08-08

## 2017-08-06 RX ORDER — SODIUM CHLORIDE 9 MG/ML
3 INJECTION INTRAMUSCULAR; INTRAVENOUS; SUBCUTANEOUS ONCE
Qty: 0 | Refills: 0 | Status: COMPLETED | OUTPATIENT
Start: 2017-08-06 | End: 2017-08-06

## 2017-08-06 RX ORDER — ASPIRIN/CALCIUM CARB/MAGNESIUM 324 MG
325 TABLET ORAL ONCE
Qty: 0 | Refills: 0 | Status: COMPLETED | OUTPATIENT
Start: 2017-08-06 | End: 2017-08-06

## 2017-08-06 RX ORDER — MONTELUKAST 4 MG/1
10 TABLET, CHEWABLE ORAL DAILY
Qty: 0 | Refills: 0 | Status: DISCONTINUED | OUTPATIENT
Start: 2017-08-06 | End: 2017-08-08

## 2017-08-06 RX ORDER — CITALOPRAM 10 MG/1
20 TABLET, FILM COATED ORAL DAILY
Qty: 0 | Refills: 0 | Status: DISCONTINUED | OUTPATIENT
Start: 2017-08-06 | End: 2017-08-08

## 2017-08-06 RX ORDER — SUCRALFATE 1 G
1 TABLET ORAL
Qty: 0 | Refills: 0 | COMMUNITY

## 2017-08-06 RX ORDER — OMEPRAZOLE 10 MG/1
1 CAPSULE, DELAYED RELEASE ORAL
Qty: 0 | Refills: 0 | COMMUNITY

## 2017-08-06 RX ORDER — AMLODIPINE BESYLATE 2.5 MG/1
5 TABLET ORAL DAILY
Qty: 0 | Refills: 0 | Status: DISCONTINUED | OUTPATIENT
Start: 2017-08-06 | End: 2017-08-08

## 2017-08-06 RX ORDER — ALPRAZOLAM 0.25 MG
0.25 TABLET ORAL DAILY
Qty: 0 | Refills: 0 | Status: DISCONTINUED | OUTPATIENT
Start: 2017-08-06 | End: 2017-08-08

## 2017-08-06 RX ORDER — TRAMADOL HYDROCHLORIDE 50 MG/1
50 TABLET ORAL
Qty: 0 | Refills: 0 | Status: DISCONTINUED | OUTPATIENT
Start: 2017-08-06 | End: 2017-08-08

## 2017-08-06 RX ORDER — NYSTATIN 500MM UNIT
4 POWDER (EA) MISCELLANEOUS
Qty: 0 | Refills: 0 | COMMUNITY

## 2017-08-06 RX ORDER — ALPRAZOLAM 0.25 MG
1 TABLET ORAL
Qty: 0 | Refills: 0 | COMMUNITY

## 2017-08-06 RX ORDER — CITALOPRAM 10 MG/1
1 TABLET, FILM COATED ORAL
Qty: 0 | Refills: 0 | COMMUNITY

## 2017-08-06 RX ORDER — ENOXAPARIN SODIUM 100 MG/ML
40 INJECTION SUBCUTANEOUS DAILY
Qty: 0 | Refills: 0 | Status: DISCONTINUED | OUTPATIENT
Start: 2017-08-06 | End: 2017-08-08

## 2017-08-06 RX ORDER — SIMVASTATIN 20 MG/1
1 TABLET, FILM COATED ORAL
Qty: 0 | Refills: 0 | COMMUNITY

## 2017-08-06 RX ORDER — ASPIRIN/CALCIUM CARB/MAGNESIUM 324 MG
81 TABLET ORAL DAILY
Qty: 0 | Refills: 0 | Status: DISCONTINUED | OUTPATIENT
Start: 2017-08-06 | End: 2017-08-08

## 2017-08-06 RX ORDER — LOSARTAN POTASSIUM 100 MG/1
25 TABLET, FILM COATED ORAL DAILY
Qty: 0 | Refills: 0 | Status: DISCONTINUED | OUTPATIENT
Start: 2017-08-06 | End: 2017-08-08

## 2017-08-06 RX ORDER — TRAMADOL HYDROCHLORIDE 50 MG/1
1 TABLET ORAL
Qty: 0 | Refills: 0 | COMMUNITY

## 2017-08-06 RX ORDER — OLMESARTAN MEDOXOMIL 5 MG/1
2 TABLET, FILM COATED ORAL
Qty: 0 | Refills: 0 | COMMUNITY

## 2017-08-06 RX ORDER — CLOPIDOGREL BISULFATE 75 MG/1
75 TABLET, FILM COATED ORAL DAILY
Qty: 0 | Refills: 0 | Status: DISCONTINUED | OUTPATIENT
Start: 2017-08-06 | End: 2017-08-08

## 2017-08-06 RX ORDER — FINASTERIDE 5 MG/1
5 TABLET, FILM COATED ORAL DAILY
Qty: 0 | Refills: 0 | Status: DISCONTINUED | OUTPATIENT
Start: 2017-08-06 | End: 2017-08-08

## 2017-08-06 RX ORDER — TAMSULOSIN HYDROCHLORIDE 0.4 MG/1
0.4 CAPSULE ORAL AT BEDTIME
Qty: 0 | Refills: 0 | Status: DISCONTINUED | OUTPATIENT
Start: 2017-08-06 | End: 2017-08-08

## 2017-08-06 RX ADMIN — MONTELUKAST 10 MILLIGRAM(S): 4 TABLET, CHEWABLE ORAL at 11:16

## 2017-08-06 RX ADMIN — ENOXAPARIN SODIUM 40 MILLIGRAM(S): 100 INJECTION SUBCUTANEOUS at 11:13

## 2017-08-06 RX ADMIN — SIMVASTATIN 40 MILLIGRAM(S): 20 TABLET, FILM COATED ORAL at 22:02

## 2017-08-06 RX ADMIN — Medication 81 MILLIGRAM(S): at 11:14

## 2017-08-06 RX ADMIN — LOSARTAN POTASSIUM 25 MILLIGRAM(S): 100 TABLET, FILM COATED ORAL at 11:14

## 2017-08-06 RX ADMIN — Medication 100 MILLIGRAM(S): at 23:54

## 2017-08-06 RX ADMIN — FINASTERIDE 5 MILLIGRAM(S): 5 TABLET, FILM COATED ORAL at 11:14

## 2017-08-06 RX ADMIN — Medication 0.25 MILLIGRAM(S): at 11:14

## 2017-08-06 RX ADMIN — Medication 100 MILLIGRAM(S): at 11:13

## 2017-08-06 RX ADMIN — TAMSULOSIN HYDROCHLORIDE 0.4 MILLIGRAM(S): 0.4 CAPSULE ORAL at 21:59

## 2017-08-06 RX ADMIN — Medication 5 MILLIGRAM(S): at 17:25

## 2017-08-06 RX ADMIN — CLOPIDOGREL BISULFATE 75 MILLIGRAM(S): 75 TABLET, FILM COATED ORAL at 11:14

## 2017-08-06 RX ADMIN — SODIUM CHLORIDE 3 MILLILITER(S): 9 INJECTION INTRAMUSCULAR; INTRAVENOUS; SUBCUTANEOUS at 01:35

## 2017-08-06 RX ADMIN — AMLODIPINE BESYLATE 5 MILLIGRAM(S): 2.5 TABLET ORAL at 11:14

## 2017-08-06 RX ADMIN — Medication 100 MILLIGRAM(S): at 17:25

## 2017-08-06 RX ADMIN — Medication 325 MILLIGRAM(S): at 01:37

## 2017-08-06 NOTE — H&P ADULT - NSHPPHYSICALEXAM_GEN_ALL_CORE
PHYSICAL EXAMINATION:  Vital Signs Last 24 Hrs  T(C): 36.5 (06 Aug 2017 06:07), Max: 36.6 (06 Aug 2017 01:16)  T(F): 97.7 (06 Aug 2017 06:07), Max: 97.9 (06 Aug 2017 01:16)  HR: 81 (06 Aug 2017 06:07) (78 - 89)  BP: 145/75 (06 Aug 2017 06:07) (144/76 - 165/77)  BP(mean): --  RR: 18 (06 Aug 2017 06:07) (16 - 20)  SpO2: 100% (06 Aug 2017 06:07) (96% - 100%)  CAPILLARY BLOOD GLUCOSE            GENERAL: NAD, well-groomed, well-developed  HEAD:  atraumatic, normocephalic  EYES: sclera anicteric  ENMT: mucous membranes moist  NECK: supple, No JVD  CHEST/LUNG: clear to auscultation bilaterally; no rales, rhonchi, or wheezing b/l  HEART: normal S1, S2  ABDOMEN: BS+, soft, ND, NT   EXTREMITIES:  pulses palpable; no clubbing, cyanosis, or edema b/l LEs  NEURO: awake, alert, interactive; moves all extremities  SKIN: no rashes or lesions

## 2017-08-06 NOTE — CONSULT NOTE ADULT - SUBJECTIVE AND OBJECTIVE BOX
Date of Admission:08/05/2017    CHIEF COMPLAINT: Chest pain     HISTORY OF PRESENT ILLNESS:  83M with PMH CAD s/p BMS to pLAD on 1/30/17 (on DAPT), HTN, HLD, CVA w/o residual, seizure disorder, controlled asthma (never intubated), former smoker, RA, OA (right knee), [place], esophageal CA (on chemo/RXT- last tx in May).  Cardiac angiogram 1/30/17 in setting of stable angina s/p PCI BMS x 3 to pLAD; non-obstructive disease mLCx (30% stenosis) & dRCA (50% stenosis).  Admit 8/5 with c/o midsternal intermittent nonradiating dull exertional chest pain x 1 months associated with orthopnea.  Denies any other associated factors; denies palpitations, sob/wheezing, lightheadedness/dizziness.     Allergies: penicillin (Rash)    MEDICATIONS:  aspirin enteric coated 81 milliGRAM(s) Oral daily  losartan 25 milliGRAM(s) Oral daily  tamsulosin 0.4 milliGRAM(s) Oral at bedtime  clopidogrel Tablet 75 milliGRAM(s) Oral daily  amLODIPine   Tablet 5 milliGRAM(s) Oral daily  enoxaparin Injectable 40 milliGRAM(s) SubCutaneous daily  montelukast 10 milliGRAM(s) Oral daily  traMADol 50 milliGRAM(s) Oral two times a day  phenytoin   Capsule 100 milliGRAM(s) Oral four times a day  citalopram 20 milliGRAM(s) Oral daily  ALPRAZolam 0.25 milliGRAM(s) Oral daily  pantoprazole    Tablet 40 milliGRAM(s) Oral before breakfast  finasteride 5 milliGRAM(s) Oral daily  predniSONE   Tablet 5 milliGRAM(s) Oral two times a day  simvastatin 40 milliGRAM(s) Oral at bedtime    PAST MEDICAL & SURGICAL HISTORY:  OA (osteoarthritis) of knee: right  Former smoker, stopped smoking in distant past  Former smoker  Rheumatoid arthritis  Seizure disorder  Asthma  Hyperlipidemia  BPH (benign prostatic hyperplasia)  HTN (hypertension)  Esophagus cancer  Chronic allergic rhinitis  BCC (basal cell carcinoma): skin  Cerebrovascular accident (CVA)  Anxiety  H/O heart artery stent  Knee arthropathy  History of total hip replacement  History of hernia repair    FAMILY HISTORY:  Family history of pulmonary embolism  FH: CAD (coronary artery disease)    SOCIAL HISTORY: denies     REVIEW OF SYSTEMS: focused   Lungs: denies sob, betancourt, wheezing, cough   CV: (+) chest pain, (+) orthopnea; denies palpitations   Ext: denies edema or cyanosis     PHYSICAL EXAM:  T(C): 36.5 (08-06-17 @ 13:24), Max: 36.6 (08-06-17 @ 01:16)  HR: 75 (08-06-17 @ 13:24) (73 - 89)  BP: 152/74 (08-06-17 @ 13:24) (144/76 - 165/77)  RR: 17 (08-06-17 @ 13:24) (16 - 20)  SpO2: 100% (08-06-17 @ 13:24) (96% - 100%)    Appearance: Normal	  HEENT:   Normal oral mucosa, PERRL, EOMI	  Lymphatic: No lymphadenopathy  Cardiovascular: Normal S1 S2, No JVD, No murmurs, No edema  Respiratory: Lungs clear to auscultation	  Psychiatry: A & O x 3, Mood & affect appropriate  Gastrointestinal:  Soft, Non-tender, + BS	  Skin: No rashes, No ecchymoses, No cyanosis	  Neurologic: Non-focal  Extremities: Normal range of motion, No clubbing, cyanosis or edema  Vascular: Peripheral pulses palpable 2+ bilaterally    LABS:	 	  CBC Full  -  ( 06 Aug 2017 01:40 )  WBC Count : 6.6 K/uL  Hemoglobin : 10.5 g/dL  Hematocrit : 31.7 %  Platelet Count - Automated : 193 K/uL  Mean Cell Volume : 102.0 fl  Mean Cell Hemoglobin : 33.8 pg  Mean Cell Hemoglobin Concentration : 33.2 gm/dL  Auto Neutrophil # : 4.8 K/uL  Auto Lymphocyte # : 0.9 K/uL  Auto Monocyte # : 0.7 K/uL  Auto Eosinophil # : 0.2 K/uL  Auto Basophil # : 0.0 K/uL  Auto Neutrophil % : 72.3 %  Auto Lymphocyte % : 13.3 %  Auto Monocyte % : 10.5 %  Auto Eosinophil % : 3.4 %  Auto Basophil % : 0.5 %    08-06    143  |  108  |  21  ----------------------------<  122<H>  4.0   |  23  |  0.99    Ca    9.5      06 Aug 2017 01:40    TPro  7.1  /  Alb  3.5  /  TBili  0.1<L>  /  DBili  x   /  AST  48<H>  /  ALT  49<H>  /  AlkPhos  191<H>  08-06    HgA1c - 5.2 (6/2017)    CARDIAC MARKERS ( 06 Aug 2017 06:01 )  x     / 0.02 ng/mL / x     / x     / x      CARDIAC MARKERS ( 06 Aug 2017 01:40 )  x     / 0.02 ng/mL / 63 U/L / x     / 3.6 ng/mL    CTA - no PE, mild pulm edema  Venous Duplex: no DVT	    PREVIOUS DIAGNOSTIC TESTING:    [X]  Catheterization:1/30/2017 BMS to pLAD w/ non-obstructive disease     	  ASSESSMENT/PLAN: Date of Admission:08/05/2017    CHIEF COMPLAINT: Chest pain     HISTORY OF PRESENT ILLNESS:  83M with PMH CAD s/p BMS to pLAD on 1/30/17 (on DAPT), HTN, HLD, CVA w/o residual, seizure disorder, controlled asthma on oral steroids (never intubated), former smoker, RA, OA (right knee), [place], esophageal CA (on chemo/RXT- last tx in May).  Cardiac angiogram 1/30/17 in setting of stable angina s/p PCI BMS x 3 to pLAD; non-obstructive disease mLCx (30% stenosis) & dRCA (50% stenosis).  Admit 8/5 with c/o midsternal intermittent nonradiating dull exertional chest pain x 1 months associated with orthopnea.  Denies any other associated factors; denies palpitations, sob/wheezing, lightheadedness/dizziness.     Allergies: penicillin (Rash)    MEDICATIONS:  aspirin enteric coated 81 milliGRAM(s) Oral daily  losartan 25 milliGRAM(s) Oral daily  tamsulosin 0.4 milliGRAM(s) Oral at bedtime  clopidogrel Tablet 75 milliGRAM(s) Oral daily  amLODIPine   Tablet 5 milliGRAM(s) Oral daily  enoxaparin Injectable 40 milliGRAM(s) SubCutaneous daily  montelukast 10 milliGRAM(s) Oral daily  traMADol 50 milliGRAM(s) Oral two times a day  phenytoin   Capsule 100 milliGRAM(s) Oral four times a day  citalopram 20 milliGRAM(s) Oral daily  ALPRAZolam 0.25 milliGRAM(s) Oral daily  pantoprazole    Tablet 40 milliGRAM(s) Oral before breakfast  finasteride 5 milliGRAM(s) Oral daily  predniSONE   Tablet 5 milliGRAM(s) Oral two times a day  simvastatin 40 milliGRAM(s) Oral at bedtime    PAST MEDICAL & SURGICAL HISTORY:  OA (osteoarthritis) of knee: right  Former smoker, stopped smoking in distant past  Former smoker  Rheumatoid arthritis  Seizure disorder  Asthma  Hyperlipidemia  BPH (benign prostatic hyperplasia)  HTN (hypertension)  Esophagus cancer  Chronic allergic rhinitis  BCC (basal cell carcinoma): skin  Cerebrovascular accident (CVA)  Anxiety  H/O heart artery stent  Knee arthropathy  History of total hip replacement  History of hernia repair    FAMILY HISTORY:  Family history of pulmonary embolism  FH: CAD (coronary artery disease)    SOCIAL HISTORY: denies     REVIEW OF SYSTEMS: focused   Lungs: denies sob, betancourt, wheezing, cough   CV: (+) chest pain, (+) orthopnea; denies palpitations   Ext: denies edema or cyanosis     PHYSICAL EXAM:  T(C): 36.5 (08-06-17 @ 13:24), Max: 36.6 (08-06-17 @ 01:16)  HR: 75 (08-06-17 @ 13:24) (73 - 89)  BP: 152/74 (08-06-17 @ 13:24) (144/76 - 165/77)  RR: 17 (08-06-17 @ 13:24) (16 - 20)  SpO2: 100% (08-06-17 @ 13:24) (96% - 100%)    Appearance: Normal	  HEENT:   Normal oral mucosa, PERRL, EOMI	  Lymphatic: No lymphadenopathy  Cardiovascular: Normal S1 S2, No JVD, No murmurs, No edema  Respiratory: Lungs clear to auscultation	  Psychiatry: A & O x 3, Mood & affect appropriate  Gastrointestinal:  Soft, Non-tender, + BS	  Skin: No rashes, No ecchymoses, No cyanosis	  Neurologic: Non-focal  Extremities: Normal range of motion, No clubbing, cyanosis or edema  Vascular: Peripheral pulses palpable 2+ bilaterally    LABS:	 	  CBC Full  -  ( 06 Aug 2017 01:40 )  WBC Count : 6.6 K/uL  Hemoglobin : 10.5 g/dL  Hematocrit : 31.7 %  Platelet Count - Automated : 193 K/uL  Mean Cell Volume : 102.0 fl  Mean Cell Hemoglobin : 33.8 pg  Mean Cell Hemoglobin Concentration : 33.2 gm/dL  Auto Neutrophil # : 4.8 K/uL  Auto Lymphocyte # : 0.9 K/uL  Auto Monocyte # : 0.7 K/uL  Auto Eosinophil # : 0.2 K/uL  Auto Basophil # : 0.0 K/uL  Auto Neutrophil % : 72.3 %  Auto Lymphocyte % : 13.3 %  Auto Monocyte % : 10.5 %  Auto Eosinophil % : 3.4 %  Auto Basophil % : 0.5 %    08-06    143  |  108  |  21  ----------------------------<  122<H>  4.0   |  23  |  0.99    Ca    9.5      06 Aug 2017 01:40    TPro  7.1  /  Alb  3.5  /  TBili  0.1<L>  /  DBili  x   /  AST  48<H>  /  ALT  49<H>  /  AlkPhos  191<H>  08-06    HgA1c - 5.2 (6/2017)    CARDIAC MARKERS ( 06 Aug 2017 06:01 )  x     / 0.02 ng/mL / x     / x     / x      CARDIAC MARKERS ( 06 Aug 2017 01:40 )  x     / 0.02 ng/mL / 63 U/L / x     / 3.6 ng/mL    CTA - no PE, mild pulm edema  Venous Duplex: no DVT	    PREVIOUS DIAGNOSTIC TESTING:    [X]  Catheterization:1/30/2017 BMS to pLAD w/ non-obstructive disease     	  ASSESSMENT/PLAN:

## 2017-08-06 NOTE — ED PROVIDER NOTE - OBJECTIVE STATEMENT
Cardiac stent placed on June 30 when pre-op   Leif Santiago  (Cards+ PCP): [phone]  Interventionalist: [place] One month intermittent exertional CP and orthopnea. Cardiac stent x3 on June 30. No cough. No leg pain.  Cardiac stent placed on June 30 when pre-op   Leif Santiago  (Cards+ PCP): [phone]  Interventionalist: Franklin 82M PMH of esophageal CA (last chemotherapy May 2017) p/w one month intermittent exertional CP and orthopnea. Cardiac stent x3 on June 30th 2017. Had 2 week f/u with cardiologist but not symptomatic at that time. Has not been able to f/u with Dr. Santiago since Sx started. Family encouraged pt come in today. No cough. No leg pain.  Cardiac stent placed on June 30 when pre-op   Leif Santiago  (Cards+ PCP): [phone]  Interventionalist: [place]

## 2017-08-06 NOTE — H&P ADULT - HISTORY OF PRESENT ILLNESS
82M PMH of esophageal CA (last chemotherapy May 2017) p/w one month intermittent exertional CP and orthopnea. Cardiac stent x3 on June 30th 2017. Had 2 week f/u with cardiologist but not symptomatic at that time. Has not been able to f/u with Dr. Santiago since Sx started. Family encouraged pt come in today. No cough. No leg pain. Cardiac stent placed on June 30 when pre-op  Leif Santiago  (Cards+ PCP): [phone] Interventionalist: [place]

## 2017-08-06 NOTE — H&P ADULT - NSHPREVIEWOFSYSTEMS_GEN_ALL_CORE
REVIEW OF SYSTEMS:    CONSTITUTIONAL: No weakness, fevers or chills  EYES/ENT: No visual changes;  No vertigo or throat pain   NECK: No pain or stiffness  RESPIRATORY: No cough, wheezing, hemoptysis; No shortness of breath  CARDIOVASCULAR: chest pain   GASTROINTESTINAL: No abdominal or epigastric pain. No nausea, vomiting, or hematemesis; No diarrhea or constipation. No melena or hematochezia.  GENITOURINARY: No dysuria, frequency or hematuria  NEUROLOGICAL: No numbness or weakness  SKIN: No itching, rashes

## 2017-08-06 NOTE — H&P ADULT - NSHPLABSRESULTS_GEN_ALL_CORE
LABS:                        10.5   6.6   )-----------( 193      ( 06 Aug 2017 01:40 )             31.7     08-06    143  |  108  |  21  ----------------------------<  122<H>  4.0   |  23  |  0.99    Ca    9.5      06 Aug 2017 01:40    TPro  7.1  /  Alb  3.5  /  TBili  0.1<L>  /  DBili  x   /  AST  48<H>  /  ALT  49<H>  /  AlkPhos  191<H>  08-06    PT/INR - ( 06 Aug 2017 01:40 )   PT: 10.5 sec;   INR: 0.97 ratio         PTT - ( 06 Aug 2017 01:40 )  PTT:30.2 sec        RADIOLOGY & ADDITIONAL TESTS:

## 2017-08-06 NOTE — ED ADULT NURSE NOTE - PSH
H/O heart artery stent    History of hernia repair    History of total hip replacement    Knee arthropathy

## 2017-08-06 NOTE — ED PROVIDER NOTE - MEDICAL DECISION MAKING DETAILS
Chest PMH of recent cardiac stents: EKG non-ischemic x1. Troponin WNL x2. CXR clear. ASA given in ED. CTA chest prelim neg for PE. Admit for ACS r/o.

## 2017-08-06 NOTE — CONSULT NOTE ADULT - PROBLEM SELECTOR RECOMMENDATION 9
ACS ruled out; CE neg x 2 sets  No acute findings on EKG  Obtain TTE   Pharm [facility] vs. [facility]  DAPT for now, BMS 1/2017  Metoprolol 25mg bid  Statin, trend LFTs  Fasting lipid panel  BMP daily   TSH  Cards to f/u in am ACS ruled out; CE neg x 2 sets  No acute findings on EKG  Obtain TTE   Pharm [facility] vs. [facility]  DAPT for now, BMS 1/2017  Avoid beta blocker 2/2 asthma  Statin, trend LFTs  Fasting lipid panel  BMP daily   TSH  Cards to f/u in am

## 2017-08-06 NOTE — CONSULT NOTE ADULT - ASSESSMENT
83M with PMH significant for CAD s/p BMS to pLAD on 1/30/17 (on DAPT), HTN, HLD, CVA w/o residual, former smoker, esophageal CA on chemo/RXT last in 5/2017.  Cardiac angiogram 1/30/17 in setting of stable angina s/p PCI BMS x 3 to pLAD; non-obstructive disease mLCx (30% stenosis) & dRCA (50% stenosis).  Admit 8/5 unstable angina.

## 2017-08-07 LAB
ANION GAP SERPL CALC-SCNC: 10 MMOL/L — SIGNIFICANT CHANGE UP (ref 5–17)
BUN SERPL-MCNC: 12 MG/DL — SIGNIFICANT CHANGE UP (ref 7–23)
CALCIUM SERPL-MCNC: 9.4 MG/DL — SIGNIFICANT CHANGE UP (ref 8.4–10.5)
CHLORIDE SERPL-SCNC: 105 MMOL/L — SIGNIFICANT CHANGE UP (ref 96–108)
CO2 SERPL-SCNC: 26 MMOL/L — SIGNIFICANT CHANGE UP (ref 22–31)
CREAT SERPL-MCNC: 0.66 MG/DL — SIGNIFICANT CHANGE UP (ref 0.5–1.3)
GLUCOSE SERPL-MCNC: 79 MG/DL — SIGNIFICANT CHANGE UP (ref 70–99)
HCT VFR BLD CALC: 30.9 % — LOW (ref 39–50)
HGB BLD-MCNC: 10.2 G/DL — LOW (ref 13–17)
MCHC RBC-ENTMCNC: 33.1 GM/DL — SIGNIFICANT CHANGE UP (ref 32–36)
MCHC RBC-ENTMCNC: 33.6 PG — SIGNIFICANT CHANGE UP (ref 27–34)
MCV RBC AUTO: 101 FL — HIGH (ref 80–100)
PLATELET # BLD AUTO: 184 K/UL — SIGNIFICANT CHANGE UP (ref 150–400)
POTASSIUM SERPL-MCNC: 4.3 MMOL/L — SIGNIFICANT CHANGE UP (ref 3.5–5.3)
POTASSIUM SERPL-SCNC: 4.3 MMOL/L — SIGNIFICANT CHANGE UP (ref 3.5–5.3)
RBC # BLD: 3.05 M/UL — LOW (ref 4.2–5.8)
RBC # FLD: 12.8 % — SIGNIFICANT CHANGE UP (ref 10.3–14.5)
SODIUM SERPL-SCNC: 141 MMOL/L — SIGNIFICANT CHANGE UP (ref 135–145)
WBC # BLD: 6.3 K/UL — SIGNIFICANT CHANGE UP (ref 3.8–10.5)
WBC # FLD AUTO: 6.3 K/UL — SIGNIFICANT CHANGE UP (ref 3.8–10.5)

## 2017-08-07 PROCEDURE — 99152 MOD SED SAME PHYS/QHP 5/>YRS: CPT

## 2017-08-07 PROCEDURE — 99223 1ST HOSP IP/OBS HIGH 75: CPT

## 2017-08-07 PROCEDURE — 93010 ELECTROCARDIOGRAM REPORT: CPT

## 2017-08-07 PROCEDURE — 92920 PRQ TRLUML C ANGIOP 1ART&/BR: CPT | Mod: LD,GC

## 2017-08-07 PROCEDURE — 93454 CORONARY ARTERY ANGIO S&I: CPT | Mod: 26,59,GC

## 2017-08-07 RX ADMIN — FINASTERIDE 5 MILLIGRAM(S): 5 TABLET, FILM COATED ORAL at 10:19

## 2017-08-07 RX ADMIN — Medication 5 MILLIGRAM(S): at 21:22

## 2017-08-07 RX ADMIN — CLOPIDOGREL BISULFATE 75 MILLIGRAM(S): 75 TABLET, FILM COATED ORAL at 10:19

## 2017-08-07 RX ADMIN — SIMVASTATIN 40 MILLIGRAM(S): 20 TABLET, FILM COATED ORAL at 21:22

## 2017-08-07 RX ADMIN — Medication 100 MILLIGRAM(S): at 21:52

## 2017-08-07 RX ADMIN — AMLODIPINE BESYLATE 5 MILLIGRAM(S): 2.5 TABLET ORAL at 06:31

## 2017-08-07 RX ADMIN — PANTOPRAZOLE SODIUM 40 MILLIGRAM(S): 20 TABLET, DELAYED RELEASE ORAL at 06:30

## 2017-08-07 RX ADMIN — Medication 100 MILLIGRAM(S): at 15:33

## 2017-08-07 RX ADMIN — TAMSULOSIN HYDROCHLORIDE 0.4 MILLIGRAM(S): 0.4 CAPSULE ORAL at 21:22

## 2017-08-07 RX ADMIN — Medication 81 MILLIGRAM(S): at 10:19

## 2017-08-07 RX ADMIN — TRAMADOL HYDROCHLORIDE 50 MILLIGRAM(S): 50 TABLET ORAL at 06:30

## 2017-08-07 RX ADMIN — LOSARTAN POTASSIUM 25 MILLIGRAM(S): 100 TABLET, FILM COATED ORAL at 06:31

## 2017-08-07 RX ADMIN — TRAMADOL HYDROCHLORIDE 50 MILLIGRAM(S): 50 TABLET ORAL at 21:45

## 2017-08-07 RX ADMIN — Medication 5 MILLIGRAM(S): at 06:30

## 2017-08-07 RX ADMIN — TRAMADOL HYDROCHLORIDE 50 MILLIGRAM(S): 50 TABLET ORAL at 21:22

## 2017-08-07 RX ADMIN — Medication 100 MILLIGRAM(S): at 06:38

## 2017-08-07 RX ADMIN — TRAMADOL HYDROCHLORIDE 50 MILLIGRAM(S): 50 TABLET ORAL at 07:00

## 2017-08-07 RX ADMIN — MONTELUKAST 10 MILLIGRAM(S): 4 TABLET, CHEWABLE ORAL at 21:22

## 2017-08-07 RX ADMIN — Medication 0.25 MILLIGRAM(S): at 11:47

## 2017-08-07 NOTE — PROGRESS NOTE ADULT - ASSESSMENT
82 year-old gentleman with known CAD status post PCI with BMS to LAD in June 2017 now presents with recurrence of unstable angina.  Patient needs repeat LHC prior to eventual surgery for gastric ca.  Continue DAPT and statin. Consider uptitrating to high intensity statin for patient with known CAD.  Would start beta-blocker for patient with known CAD and unstable angina.    Patient known to Leif Santiago as outpatient.

## 2017-08-07 NOTE — PROGRESS NOTE ADULT - SUBJECTIVE AND OBJECTIVE BOX
HPI:  No further chest pain overnight.  Patient is known to Leif Santiago MD as outpatient, but he has requested that we see patient during this admission.    Review Of Systems:  No active chest pain, shortness of breath, or palpitations            Medications:  finasteride 5 milliGRAM(s) Oral daily  predniSONE   Tablet 5 milliGRAM(s) Oral two times a day  aspirin enteric coated 81 milliGRAM(s) Oral daily  traMADol 50 milliGRAM(s) Oral two times a day  losartan 25 milliGRAM(s) Oral daily  tamsulosin 0.4 milliGRAM(s) Oral at bedtime  phenytoin   Capsule 100 milliGRAM(s) Oral four times a day  citalopram 20 milliGRAM(s) Oral daily  simvastatin 40 milliGRAM(s) Oral at bedtime  clopidogrel Tablet 75 milliGRAM(s) Oral daily  ALPRAZolam 0.25 milliGRAM(s) Oral daily  amLODIPine   Tablet 5 milliGRAM(s) Oral daily  montelukast 10 milliGRAM(s) Oral daily  pantoprazole    Tablet 40 milliGRAM(s) Oral before breakfast  enoxaparin Injectable 40 milliGRAM(s) SubCutaneous daily    PAST MEDICAL & SURGICAL HISTORY:  OA (osteoarthritis) of knee: right  Former smoker, stopped smoking in distant past  Former smoker  Rheumatoid arthritis  Seizure disorder  Asthma  Hyperlipidemia  BPH (benign prostatic hyperplasia)  HTN (hypertension)  Esophagus cancer  Chronic allergic rhinitis  BCC (basal cell carcinoma): skin  Cerebrovascular accident (CVA)  Anxiety  H/O heart artery stent  Knee arthropathy  History of total hip replacement  History of hernia repair    Vitals:  T(C): 36.5 (17 @ 04:13), Max: 36.9 (17 @ 22:14)  HR: 66 (17 @ 04:13) (66 - 75)  BP: 158/80 (17 @ 04:13) (143/82 - 158/80)  BP(mean): --  RR: 18 (17 @ 04:13) (17 - 18)  SpO2: 99% (17 @ 04:13) (99% - 100%)  Wt(kg): --  Daily Height in cm: 162.56 (06 Aug 2017 21:54)    Daily Weight in k (07 Aug 2017 04:13)  I&O's Summary    06 Aug 2017 07:01  -  07 Aug 2017 07:00  --------------------------------------------------------  IN: 120 mL / OUT: 200 mL / NET: -80 mL      Physical Exam:  Appearance: No acute distress; well appearing  Eyes: PERRL, EOMI, pink conjunctiva  HENT: Normal oral mucosa  Cardiovascular: RRR, S1, S2, no murmurs, rubs, or gallops; no edema; no JVD  Respiratory: Clear to auscultation bilaterally  Gastrointestinal: soft, non-tender, non-distended with normal bowel sounds  Musculoskeletal: No clubbing; no joint deformity   Neurologic: Non-focal  Lymphatic: No lymphadenopathy  Psychiatry: AAOx3, mood & affect appropriate  Skin: No rashes, ecchymoses, or cyanosis                          10.2   6.3   )-----------( 184      ( 07 Aug 2017 06:50 )             30.9     08-07    141  |  105  |  12  ----------------------------<  79  4.3   |  26  |  0.66    Ca    9.4      07 Aug 2017 06:50    TPro  7.1  /  Alb  3.5  /  TBili  0.1<L>  /  DBili  x   /  AST  48<H>  /  ALT  49<H>  /  AlkPhos  191<H>  08-06    PT/INR - ( 06 Aug 2017 01:40 )   PT: 10.5 sec;   INR: 0.97 ratio         PTT - ( 06 Aug 2017 01:40 )  PTT:30.2 sec  CARDIAC MARKERS ( 06 Aug 2017 06:01 )  x     / 0.02 ng/mL / x     / x     / x      CARDIAC MARKERS ( 06 Aug 2017 01:40 )  x     / 0.02 ng/mL / 63 U/L / x     / 3.6 ng/mL        ECG: sinus 83 bpm with RBBB, LAFB (bifascicular block) and PVCs    Cath: PCI to prox LAD < from: Cardiac Cath Lab - Adult (17 @ 12:38) >  CORONARY VESSELS: The coronary circulation is right dominant.  LM:   --  LM: Normal.  LAD:   --  Proximal LAD: There was a 90 % stenosis.  CX:   --  Mid circumflex: There was a 30 % stenosis.  RCA:   --  Distal RCA: There was a 50 % stenosis.  COMPLICATIONS: There were no complications.  INTERVENTIONAL RECOMMENDATIONS: ASA and Plavix for 3 weeks  Prepared and signed by  Lavelle Reid M.D.  Signed 2017 18:30:44    < end of copied text >    Interpretation of Telemetry: NSR, 70-90s with PVCs

## 2017-08-07 NOTE — PROGRESS NOTE ADULT - ASSESSMENT
S/P  CP, H/O CAD, STENTS    S/P RECENT CATH,  SPOKE WITH DR CHANO JEAN,  DR IBARRA WILL COVER  [facility] SCHEDULED  FOR  ESOPHAGEAL CANCER SURGERY S/P  CP, H/O CAD, STENTS    S/P RECENT CATH,  SPOKE WITH DR CHANO JEAN  [facility] SCHEDULED  FOR  ESOPHAGEAL CANCER SURGERY    will need  cath, house card to  f/p

## 2017-08-07 NOTE — CHART NOTE - NSCHARTNOTEFT_GEN_A_CORE
Removal of Radial Band Procedure Note     Pulses in the right upper extremity are palpable. The patient was placed in the supine position. The insertion site as identified. The radial band was removed slowly. Direct pressure was applied for 5 minutes. Monitoring of the right wrist and both upper extremities including neuro-vascular checks and vital signs every 15 minutes x 4.   Complications: none     Removed By: Suzette Lieberman CCU NP #[id number]  Date: 08/07/2017  Time: 2310    1-hour post-removal of radial band assessment of access site   Vital signs are stable, neuro-vascular status of the upper extremities are intact, stable and there is no evidence of hematoma on the right/left* upper extremity.   Complications:*    Re-assessed By:  Date:  Time: Removal of Radial Band Procedure Note     Pulses in the right upper extremity are palpable. The patient was placed in the supine position. The insertion site as identified. The radial band (D stat) was removed slowly. Direct pressure was applied for 5 minutes. Monitoring of the right wrist and both upper extremities including neuro-vascular checks and vital signs every 15 minutes x 4.   Complications: none     Removed By: Suzette Lieberman CCU NP #[id number]  Date: 08/07/2017  Time: 2310    1-hour post-removal of radial band assessment of access site   Vital signs are stable, neuro-vascular status of the upper extremities are intact, stable and there is no evidence of hematoma on the right/left* upper extremity.   Complications:*    Re-assessed By:  Date:  Time: Removal of Radial Band Procedure Note     Pulses in the right upper extremity are palpable. The patient was placed in the supine position. The insertion site as identified. The radial band (D stat) was removed slowly. Direct pressure was applied for 5 minutes. Monitoring of the right wrist and both upper extremities including neuro-vascular checks and vital signs every 15 minutes x 4.   Complications: none     Removed By: Suzette Lieberman CCU NP #[id number]  Date: 08/07/2017  Time: 2310    1-hour post-removal of radial band assessment of access site   Vital signs are stable, neuro-vascular status of the upper extremities are intact, stable and there is no evidence of hematoma on the right upper extremity.   Complications: none    Re-assessed By: Suzette Lieberman CCU NP #[id number]  Date: 08/08/2017  Time: 0010

## 2017-08-07 NOTE — PROGRESS NOTE ADULT - SUBJECTIVE AND OBJECTIVE BOX
SUBJECTIVE / OVERNIGHT EVENTS: No nausea, vomiting or diarrhea, no fever or chills, no dizziness or chest pain, no dysuria or hematuria .      CAPILLARY BLOOD GLUCOSE        I&O's Summary    06 Aug 2017 07:01  -  07 Aug 2017 07:00  --------------------------------------------------------  IN: 120 mL / OUT: 200 mL / NET: -80 mL        PHYSICAL EXAM:  HEAD:  Atraumatic, Normocephalic  EYES: EOMI, PERRLA, conjunctiva and sclera clear  NECK: Supple, No JVD  CHEST/LUNG: Clear to auscultation bilaterally; No wheeze  HEART: Regular rate and rhythm; No murmurs, rubs, or gallops  ABDOMEN: Soft, Nontender, Nondistended; Bowel sounds present  EXTREMITIES:  2+ Peripheral Pulses, No clubbing, cyanosis, or edema  PSYCH: AAOx3  NEUROLOGY: non-focal  SKIN: No rashes or lesions    MEDICATIONS  (STANDING):  finasteride 5 milliGRAM(s) Oral daily  predniSONE   Tablet 5 milliGRAM(s) Oral two times a day  aspirin enteric coated 81 milliGRAM(s) Oral daily  traMADol 50 milliGRAM(s) Oral two times a day  losartan 25 milliGRAM(s) Oral daily  tamsulosin 0.4 milliGRAM(s) Oral at bedtime  phenytoin   Capsule 100 milliGRAM(s) Oral four times a day  citalopram 20 milliGRAM(s) Oral daily  simvastatin 40 milliGRAM(s) Oral at bedtime  clopidogrel Tablet 75 milliGRAM(s) Oral daily  ALPRAZolam 0.25 milliGRAM(s) Oral daily  amLODIPine   Tablet 5 milliGRAM(s) Oral daily  montelukast 10 milliGRAM(s) Oral daily  pantoprazole    Tablet 40 milliGRAM(s) Oral before breakfast  enoxaparin Injectable 40 milliGRAM(s) SubCutaneous daily    MEDICATIONS  (PRN):      LABS:                        10.2   6.3   )-----------( 184      ( 07 Aug 2017 06:50 )             30.9     08-07    141  |  105  |  12  ----------------------------<  79  4.3   |  26  |  0.66    Ca    9.4      07 Aug 2017 06:50    TPro  7.1  /  Alb  3.5  /  TBili  0.1<L>  /  DBili  x   /  AST  48<H>  /  ALT  49<H>  /  AlkPhos  191<H>  08-06    PT/INR - ( 06 Aug 2017 01:40 )   PT: 10.5 sec;   INR: 0.97 ratio         PTT - ( 06 Aug 2017 01:40 )  PTT:30.2 sec  CARDIAC MARKERS ( 06 Aug 2017 06:01 )  x     / 0.02 ng/mL / x     / x     / x      CARDIAC MARKERS ( 06 Aug 2017 01:40 )  x     / 0.02 ng/mL / 63 U/L / x     / 3.6 ng/mL            08-06 @ 01:40  3.7  28    Hemoglobin A1C, Whole Blood: 5.2 % (06-30 @ 19:48)        Cultures:    EKG:    Radiological Studies:    Consultant(s) Notes Reviewed:      Care Discussed with Consultants/Other Providers:

## 2017-08-08 ENCOUNTER — TRANSCRIPTION ENCOUNTER (OUTPATIENT)
Age: 82
End: 2017-08-08

## 2017-08-08 VITALS
HEART RATE: 73 BPM | RESPIRATION RATE: 18 BRPM | DIASTOLIC BLOOD PRESSURE: 64 MMHG | OXYGEN SATURATION: 95 % | TEMPERATURE: 98 F | SYSTOLIC BLOOD PRESSURE: 117 MMHG

## 2017-08-08 PROCEDURE — 99233 SBSQ HOSP IP/OBS HIGH 50: CPT

## 2017-08-08 PROCEDURE — 93010 ELECTROCARDIOGRAM REPORT: CPT

## 2017-08-08 RX ORDER — ALPRAZOLAM 0.25 MG
1 TABLET ORAL
Qty: 0 | Refills: 0 | COMMUNITY

## 2017-08-08 RX ORDER — EZETIMIBE 10 MG/1
1 TABLET ORAL
Qty: 0 | Refills: 0 | COMMUNITY

## 2017-08-08 RX ORDER — TRAMADOL HYDROCHLORIDE 50 MG/1
1 TABLET ORAL
Qty: 10 | Refills: 0 | OUTPATIENT
Start: 2017-08-08 | End: 2017-08-13

## 2017-08-08 RX ORDER — CITALOPRAM 10 MG/1
1 TABLET, FILM COATED ORAL
Qty: 30 | Refills: 0 | OUTPATIENT
Start: 2017-08-08 | End: 2017-09-07

## 2017-08-08 RX ADMIN — Medication 5 MILLIGRAM(S): at 17:57

## 2017-08-08 RX ADMIN — Medication 100 MILLIGRAM(S): at 05:28

## 2017-08-08 RX ADMIN — TRAMADOL HYDROCHLORIDE 50 MILLIGRAM(S): 50 TABLET ORAL at 08:57

## 2017-08-08 RX ADMIN — Medication 0.25 MILLIGRAM(S): at 12:13

## 2017-08-08 RX ADMIN — CLOPIDOGREL BISULFATE 75 MILLIGRAM(S): 75 TABLET, FILM COATED ORAL at 12:07

## 2017-08-08 RX ADMIN — Medication 100 MILLIGRAM(S): at 17:57

## 2017-08-08 RX ADMIN — TRAMADOL HYDROCHLORIDE 50 MILLIGRAM(S): 50 TABLET ORAL at 18:39

## 2017-08-08 RX ADMIN — AMLODIPINE BESYLATE 5 MILLIGRAM(S): 2.5 TABLET ORAL at 05:28

## 2017-08-08 RX ADMIN — PANTOPRAZOLE SODIUM 40 MILLIGRAM(S): 20 TABLET, DELAYED RELEASE ORAL at 06:09

## 2017-08-08 RX ADMIN — LOSARTAN POTASSIUM 25 MILLIGRAM(S): 100 TABLET, FILM COATED ORAL at 05:28

## 2017-08-08 RX ADMIN — Medication 81 MILLIGRAM(S): at 12:06

## 2017-08-08 RX ADMIN — FINASTERIDE 5 MILLIGRAM(S): 5 TABLET, FILM COATED ORAL at 12:07

## 2017-08-08 RX ADMIN — TRAMADOL HYDROCHLORIDE 50 MILLIGRAM(S): 50 TABLET ORAL at 08:30

## 2017-08-08 RX ADMIN — ENOXAPARIN SODIUM 40 MILLIGRAM(S): 100 INJECTION SUBCUTANEOUS at 12:13

## 2017-08-08 RX ADMIN — Medication 100 MILLIGRAM(S): at 12:08

## 2017-08-08 RX ADMIN — Medication 5 MILLIGRAM(S): at 05:28

## 2017-08-08 RX ADMIN — Medication 100 MILLIGRAM(S): at 01:22

## 2017-08-08 NOTE — PROGRESS NOTE ADULT - SUBJECTIVE AND OBJECTIVE BOX
SUBJECTIVE / OVERNIGHT EVENTS: No nausea, vomiting or diarrhea, no fever or chills, no dizziness or chest pain, no dysuria or hematuria .      CAPILLARY BLOOD GLUCOSE        I&O's Summary    07 Aug 2017 07:01  -  08 Aug 2017 07:00  --------------------------------------------------------  IN: 1020 mL / OUT: 770 mL / NET: 250 mL        PHYSICAL EXAM:  HEAD:  Atraumatic, Normocephalic  EYES: EOMI, PERRLA, conjunctiva and sclera clear  NECK: Supple, No JVD  CHEST/LUNG: Clear to auscultation bilaterally; No wheeze  HEART: Regular rate and rhythm; No murmurs, rubs, or gallops  ABDOMEN: Soft, Nontender, Nondistended; Bowel sounds present  EXTREMITIES:  2+ Peripheral Pulses, No clubbing, cyanosis, or edema  PSYCH: AAOx3  NEUROLOGY: non-focal  SKIN: No rashes or lesions    MEDICATIONS  (STANDING):  finasteride 5 milliGRAM(s) Oral daily  predniSONE   Tablet 5 milliGRAM(s) Oral two times a day  aspirin enteric coated 81 milliGRAM(s) Oral daily  traMADol 50 milliGRAM(s) Oral two times a day  losartan 25 milliGRAM(s) Oral daily  tamsulosin 0.4 milliGRAM(s) Oral at bedtime  phenytoin   Capsule 100 milliGRAM(s) Oral four times a day  citalopram 20 milliGRAM(s) Oral daily  simvastatin 40 milliGRAM(s) Oral at bedtime  clopidogrel Tablet 75 milliGRAM(s) Oral daily  ALPRAZolam 0.25 milliGRAM(s) Oral daily  amLODIPine   Tablet 5 milliGRAM(s) Oral daily  montelukast 10 milliGRAM(s) Oral daily  pantoprazole    Tablet 40 milliGRAM(s) Oral before breakfast  enoxaparin Injectable 40 milliGRAM(s) SubCutaneous daily    MEDICATIONS  (PRN):      LABS:                        10.2   6.3   )-----------( 184      ( 07 Aug 2017 06:50 )             30.9     08-07    141  |  105  |  12  ----------------------------<  79  4.3   |  26  |  0.66    Ca    9.4      07 Aug 2017 06:50                    Hemoglobin A1C, Whole Blood: 5.2 % (06-30 @ 19:48)        Cultures:    EKG:    Radiological Studies:    Consultant(s) Notes Reviewed:      Care Discussed with Consultants/Other Providers:

## 2017-08-08 NOTE — DISCHARGE NOTE ADULT - SECONDARY DIAGNOSIS.
Stented coronary artery Asthma with status asthmaticus, unspecified asthma severity Malignant neoplasm of esophagus, unspecified location Essential hypertension Hyperlipidemia, unspecified hyperlipidemia type Rheumatoid arthritis

## 2017-08-08 NOTE — PROGRESS NOTE ADULT - SUBJECTIVE AND OBJECTIVE BOX
Notify by RN patient complaint of  midsternal chest pain, describe as pressure, on pain   scale 5/10. Intermittent pain at rest, non radiating. Report similar pain in past.  Denies chest pain, sob, lightheadedness, headaches, abdominal pain, nausea, vomiting, fever,      Patient is a 82y old  Male who presents with a chief complaint of cp (08 Aug 2017 11:20)    SUBJECTIVE / OVERNIGHT EVENTS:    MEDICATIONS  (STANDING):  finasteride 5 milliGRAM(s) Oral daily  predniSONE   Tablet 5 milliGRAM(s) Oral two times a day  aspirin enteric coated 81 milliGRAM(s) Oral daily  traMADol 50 milliGRAM(s) Oral two times a day  losartan 25 milliGRAM(s) Oral daily  tamsulosin 0.4 milliGRAM(s) Oral at bedtime  phenytoin   Capsule 100 milliGRAM(s) Oral four times a day  citalopram 20 milliGRAM(s) Oral daily  simvastatin 40 milliGRAM(s) Oral at bedtime  clopidogrel Tablet 75 milliGRAM(s) Oral daily  ALPRAZolam 0.25 milliGRAM(s) Oral daily  amLODIPine   Tablet 5 milliGRAM(s) Oral daily  montelukast 10 milliGRAM(s) Oral daily  pantoprazole    Tablet 40 milliGRAM(s) Oral before breakfast  enoxaparin Injectable 40 milliGRAM(s) SubCutaneous daily    MEDICATIONS  (PRN):        CAPILLARY BLOOD GLUCOSE        I&O's Summary    07 Aug 2017 07:01  -  08 Aug 2017 07:00  --------------------------------------------------------  IN: 1020 mL / OUT: 770 mL / NET: 250 mL    08 Aug 2017 07:01  -  08 Aug 2017 16:21  --------------------------------------------------------  IN: 630 mL / OUT: 0 mL / NET: 630 mL        LABS:                        10.2   6.3   )-----------( 184      ( 07 Aug 2017 06:50 )             30.9     08-07    141  |  105  |  12  ----------------------------<  79  4.3   |  26  |  0.66    Ca    9.4      07 Aug 2017 06:50                  RADIOLOGY & ADDITIONAL TESTS:    PHYSICAL EXAM:  GENERAL: NAD, well-developed  HEAD:  Atraumatic, Normocephalic  EYES: EOMI, PERRLA, conjunctiva and sclera clear  NECK: Supple, No JVD  CHEST/LUNG: Clear to auscultation bilaterally; No wheeze  HEART: Regular rate and rhythm; No murmurs, rubs, or gallops  ABDOMEN: Soft, Nontender, Nondistended; Bowel sounds present  EXTREMITIES:  2+ Peripheral Pulses, No clubbing, cyanosis, or edema  PSYCH: AAOx3  NEUROLOGY: non-focal  SKIN: No rashes or lesions        A/P:  PAST MEDICAL & SURGICAL HISTORY:  OA (osteoarthritis) of knee: right  Former smoker, stopped smoking in distant past  Former smoker  Rheumatoid arthritis  Seizure disorder  Asthma  Hyperlipidemia  BPH (benign prostatic hyperplasia)  HTN (hypertension)  Esophagus cancer  Chronic allergic rhinitis  BCC (basal cell carcinoma): skin  Cerebrovascular accident (CVA)  Anxiety  H/O heart artery stent  Knee arthropathy  History of total hip replacement  History of hernia repair

## 2017-08-08 NOTE — CHART NOTE - NSCHARTNOTEFT_GEN_A_CORE
Patient is a 82y old  Male who presents with a chief complaint of cp (08 Aug 2017 11:20)      Vital Signs Last 24 Hrs  T(C): 36.4 (08 Aug 2017 12:18), Max: 36.9 (08 Aug 2017 03:49)  T(F): 97.5 (08 Aug 2017 12:18), Max: 98.5 (08 Aug 2017 08:03)  HR: 73 (08 Aug 2017 12:18) (70 - 84)  BP: 117/64 (08 Aug 2017 12:18) (117/64 - 156/76)  BP(mean): --  RR: 18 (08 Aug 2017 12:18) (16 - 18)  SpO2: 95% (08 Aug 2017 12:18) (95% - 97%)      Labs:                          10.2   6.3   )-----------( 184      ( 07 Aug 2017 06:50 )             30.9     08-07    141  |  105  |  12  ----------------------------<  79  4.3   |  26  |  0.66    Ca    9.4      07 Aug 2017 06:50              Radiology:    Physical Exam:  General: WN/WD NAD  Neurology: A&Ox3, nonfocal, MAI x 4  Head:  Normocephalic, atraumatic  Respiratory: CTA B/L  CV: RRR, S1S2, no murmur  Abdominal: Soft, NT, ND no palpable mass  MSK: No edema, + peripheral pulses, FROM all 4 extremity    Discharge Note and Plan: discssed with Dr Covington patient stable for discharge   patient ambulate without symptoms no chest pain   Discussed with Dr Paez cleared for discharge   >Follow up with Dr Yadav outpatient   see discharge note   >  >  >

## 2017-08-08 NOTE — PROGRESS NOTE ADULT - ASSESSMENT
cad    ca esophagus,  s/pcp   needs cardiac cath   card  following  pt cad    ca esophagus,  s/pcp    s/p, cardiac cath,  still with cp today, pt to  ambulate , then nasir decide   card  following  pt

## 2017-08-08 NOTE — PROGRESS NOTE ADULT - PROBLEM SELECTOR PLAN 1
s/p cath 8/7/2017   EKG ordered no change from prior EKG 8/7/2017   continue with current medications   Protonix given patient history of esophageal cancer   ultram given with relief   CE negative   monitor vital sign   continue  with Aspirin and Plavix   Discussed with Dr Paez Cardiology agreed with above plan   seen at bedside   Discussed above plan with Dr Covington continue to monitor agreed with plan

## 2017-08-08 NOTE — PROGRESS NOTE ADULT - SUBJECTIVE AND OBJECTIVE BOX
HPI:  Patient status post PCI with BMS on 2017, now status post POBA to diagonal branch yesterday.    Review Of Systems:  No chest pain, shortness of breath, or palpitations            Medications:  finasteride 5 milliGRAM(s) Oral daily  predniSONE   Tablet 5 milliGRAM(s) Oral two times a day  aspirin enteric coated 81 milliGRAM(s) Oral daily  traMADol 50 milliGRAM(s) Oral two times a day  losartan 25 milliGRAM(s) Oral daily  tamsulosin 0.4 milliGRAM(s) Oral at bedtime  phenytoin   Capsule 100 milliGRAM(s) Oral four times a day  citalopram 20 milliGRAM(s) Oral daily  simvastatin 40 milliGRAM(s) Oral at bedtime  clopidogrel Tablet 75 milliGRAM(s) Oral daily  ALPRAZolam 0.25 milliGRAM(s) Oral daily  amLODIPine   Tablet 5 milliGRAM(s) Oral daily  montelukast 10 milliGRAM(s) Oral daily  pantoprazole    Tablet 40 milliGRAM(s) Oral before breakfast  enoxaparin Injectable 40 milliGRAM(s) SubCutaneous daily    PAST MEDICAL & SURGICAL HISTORY:  OA (osteoarthritis) of knee: right  Former smoker, stopped smoking in distant past  Former smoker  Rheumatoid arthritis  Seizure disorder  Asthma  Hyperlipidemia  BPH (benign prostatic hyperplasia)  HTN (hypertension)  Esophagus cancer  Chronic allergic rhinitis  BCC (basal cell carcinoma): skin  Cerebrovascular accident (CVA)  Anxiety  H/O heart artery stent  Knee arthropathy  History of total hip replacement  History of hernia repair    Vitals:  T(C): 36.4 (17 @ 12:18), Max: 36.9 (17 @ 03:49)  HR: 73 (17 @ 12:18) (70 - 84)  BP: 117/64 (17 @ 12:18) (117/64 - 156/76)  BP(mean): --  RR: 18 (17 @ 12:18) (16 - 18)  SpO2: 95% (17 @ 12:18) (95% - 97%)  Wt(kg): --  Daily     Daily Weight in k.1 (08 Aug 2017 03:49)  I&O's Summary    07 Aug 2017 07:01  -  08 Aug 2017 07:00  --------------------------------------------------------  IN: 1020 mL / OUT: 770 mL / NET: 250 mL    08 Aug 2017 07:01  -  08 Aug 2017 18:47  --------------------------------------------------------  IN: 630 mL / OUT: 0 mL / NET: 630 mL        Physical Exam:  Appearance: No acute distress; well appearing  Eyes: PERRL, EOMI, pink conjunctiva  HENT: Normal oral muscosa  Cardiovascular: RRR, S1, S2, no murmurs, rubs, or gallops; no edema; no JVD  Respiratory: Clear to auscultation bilaterally  Gastrointestinal: soft, non-tender, non-distended with normal bowel sounds  Musculoskeletal: No clubbing; no joint deformity   Neurologic: Non-focal  Lymphatic: No lymphadenopathy  Psychiatry: AAOx3, mood & affect appropriate  Skin: No rashes, ecchymoses, or cyanosis                          10.2   6.3   )-----------( 184      ( 07 Aug 2017 06:50 )             30.9     08-07    141  |  105  |  12  ----------------------------<  79  4.3   |  26  |  0.66    Ca    9.4      07 Aug 2017 06:50      Cath: < from: Cardiac Cath Lab - Adult (17 @ 19:06) >  CORONARY VESSELS: The coronary circulation is left dominant.  LM:   --  LM: Normal.  LAD:   --  LAD: Normal. There was no significant restenosis.  --  D2: There was a 95 % stenosis.  CX:   --  Circumflex: Normal.  RCA:   --  Mid RCA: There was a 40 % stenosis.  COMPLICATIONS: There were no complications.  INTERVENTIONAL RECOMMENDATIONS: ASA  Prepared and signed by  Lavelle Reid M.D.  Signed 2017 09:30:47    < end of copied text >    Interpretation of Telemetry: NSR

## 2017-08-08 NOTE — DISCHARGE NOTE ADULT - PLAN OF CARE
stable Low salt, low fat diet.   Weight management.   Take medications as prescribed.    No smoking.  Follow up appointments with your doctor(s)  as instructed.  patient s/p cath monitor for bleeding and increase pain at site Low salt, low fat diet.   Weight management.   Take medications as prescribed.    No smoking.  Follow up appointments with your doctor(s)  as instructed. Call your Health Care provider upon arrival home to make a follow up appointment within one week.  Take all inhalers as prescribed by your Health Care Provider.  Take steroids as prescribed by your Health Care Provider.  If your cough increases infrequency and severity and/or you have shortness of breath or increased shortness of breath call your Health Care Provider.  If you develop fever, chills, night sweats, malaise, and/or change in mental status call your Health care Provider.  Nutrition is very important.  Eat small frequent meals.  Increase your activity as tolerated.  Do not stay in bed all day please follow up with Gastroenterologists Low salt diet  Activity as tolerated.  Take all medication as prescribed.  Follow up with your medical doctor for routine blood pressure monitoring at your next visit.  Notify your doctor if you have any of the following symptoms:   Dizziness, Lightheadedness, Blurry vision, Headache, Chest pain, Shortness of breath continue with current medication at home continue with current medication Low salt, low fat diet.   Weight management.   s/p cath 8/7 with angioplasty continue with Asprin and Plavix please don't stop unless told by PMD or Cardiology    Take medications as prescribed.    No smoking.  Follow up appointments with your doctor(s)  as instructed.  patient s/p cath monitor for bleeding and increase pain at site please follow up with Gastroenterologists  awaiting for surgery

## 2017-08-08 NOTE — DISCHARGE NOTE ADULT - CARE PLAN
Principal Discharge DX:	Unstable angina  Goal:	stable  Instructions for follow-up, activity and diet:	Low salt, low fat diet.   Weight management.   Take medications as prescribed.    No smoking.  Follow up appointments with your doctor(s)  as instructed.  patient s/p cath monitor for bleeding and increase pain at site  Secondary Diagnosis:	Stented coronary artery  Instructions for follow-up, activity and diet:	Low salt, low fat diet.   Weight management.   Take medications as prescribed.    No smoking.  Follow up appointments with your doctor(s)  as instructed.  Secondary Diagnosis:	Asthma with status asthmaticus, unspecified asthma severity  Instructions for follow-up, activity and diet:	Call your Health Care provider upon arrival home to make a follow up appointment within one week.  Take all inhalers as prescribed by your Health Care Provider.  Take steroids as prescribed by your Health Care Provider.  If your cough increases infrequency and severity and/or you have shortness of breath or increased shortness of breath call your Health Care Provider.  If you develop fever, chills, night sweats, malaise, and/or change in mental status call your Health care Provider.  Nutrition is very important.  Eat small frequent meals.  Increase your activity as tolerated.  Do not stay in bed all day  Secondary Diagnosis:	Malignant neoplasm of esophagus, unspecified location  Instructions for follow-up, activity and diet:	please follow up with Gastroenterologists  Secondary Diagnosis:	Essential hypertension  Instructions for follow-up, activity and diet:	Low salt diet  Activity as tolerated.  Take all medication as prescribed.  Follow up with your medical doctor for routine blood pressure monitoring at your next visit.  Notify your doctor if you have any of the following symptoms:   Dizziness, Lightheadedness, Blurry vision, Headache, Chest pain, Shortness of breath  Secondary Diagnosis:	Hyperlipidemia, unspecified hyperlipidemia type  Instructions for follow-up, activity and diet:	continue with current medication at home  Secondary Diagnosis:	Rheumatoid arthritis  Instructions for follow-up, activity and diet:	continue with current medication Principal Discharge DX:	Unstable angina  Goal:	stable  Instructions for follow-up, activity and diet:	Low salt, low fat diet.   Weight management.   s/p cath 8/7 with angioplasty continue with Asprin and Plavix please don't stop unless told by PMD or Cardiology    Take medications as prescribed.    No smoking.  Follow up appointments with your doctor(s)  as instructed.  patient s/p cath monitor for bleeding and increase pain at site  Secondary Diagnosis:	Stented coronary artery  Instructions for follow-up, activity and diet:	Low salt, low fat diet.   Weight management.   Take medications as prescribed.    No smoking.  Follow up appointments with your doctor(s)  as instructed.  Secondary Diagnosis:	Asthma with status asthmaticus, unspecified asthma severity  Instructions for follow-up, activity and diet:	Call your Health Care provider upon arrival home to make a follow up appointment within one week.  Take all inhalers as prescribed by your Health Care Provider.  Take steroids as prescribed by your Health Care Provider.  If your cough increases infrequency and severity and/or you have shortness of breath or increased shortness of breath call your Health Care Provider.  If you develop fever, chills, night sweats, malaise, and/or change in mental status call your Health care Provider.  Nutrition is very important.  Eat small frequent meals.  Increase your activity as tolerated.  Do not stay in bed all day  Secondary Diagnosis:	Malignant neoplasm of esophagus, unspecified location  Instructions for follow-up, activity and diet:	please follow up with Gastroenterologists  awaiting for surgery  Secondary Diagnosis:	Essential hypertension  Instructions for follow-up, activity and diet:	Low salt diet  Activity as tolerated.  Take all medication as prescribed.  Follow up with your medical doctor for routine blood pressure monitoring at your next visit.  Notify your doctor if you have any of the following symptoms:   Dizziness, Lightheadedness, Blurry vision, Headache, Chest pain, Shortness of breath  Secondary Diagnosis:	Hyperlipidemia, unspecified hyperlipidemia type  Instructions for follow-up, activity and diet:	continue with current medication at home  Secondary Diagnosis:	Rheumatoid arthritis  Instructions for follow-up, activity and diet:	continue with current medication

## 2017-08-08 NOTE — DISCHARGE NOTE ADULT - PATIENT PORTAL LINK FT
“You can access the Follow[facility] Patient Portal, offered by [facility], by registering with the following website: [email or web address]”

## 2017-08-08 NOTE — DISCHARGE NOTE ADULT - ADDITIONAL INSTRUCTIONS
Please follow up with Dr Jack Yadav after discharge from hospital call and make appointment  patient Cleared by Cardiology and Dr Covington   patient ambulate without chest pain / shortness of breath Please follow up with Dr Jack Yadav after discharge from hospital call and make appointment  patient Cleared by Cardiology  Dr Paez and Dr Covington   patient ambulate without chest pain / shortness of breath/ symptoms free after ambulation   Please call PMD or go to Emergency room with increase pain without relieved

## 2017-08-08 NOTE — DISCHARGE NOTE ADULT - CARE PROVIDER_API CALL
Leif Santiago), Cardiovascular Disease; Internal Medicine  [address] [place] [address]  Phone: [phone]  Fax: [phone]

## 2017-08-08 NOTE — PROGRESS NOTE ADULT - ASSESSMENT
HPI:  82M PMH of esophageal CA (last chemotherapy May 2017) p/w one month intermittent exertional CP and orthopnea. Cardiac stent x3 on June 30th 2017. Had 2 week f/u with cardiologist but not symptomatic at that time. Has not been able to f/u with Dr. Santiago since Sx started. Family encouraged pt come in today. No cough. No leg pain. Cardiac stent placed on June 30 when pre-op  Leif Santiago  (Cards+ PCP): [phone] Interventionalist: [place] (06 Aug 2017 07:54)

## 2017-08-08 NOTE — DISCHARGE NOTE ADULT - MEDICATION SUMMARY - MEDICATIONS TO TAKE
I will START or STAY ON the medications listed below when I get home from the hospital:    finasteride 5 mg oral tablet  -- 1 tab(s) by mouth once a day  -- Indication: For bph    predniSONE 5 mg oral tablet  -- 1 tab(s) by mouth once a day  -- Indication: For osteoarthritis     traMADol 50 mg oral tablet  -- 1 tab(s) by mouth 2 times a day MDD:2  -- Indication: For Pain    aspirin 81 mg oral tablet  -- 1 tab(s) by mouth once a day  -- Indication: For Unstable angina    olmesartan 20 mg oral tablet  -- 1 tab(s) by mouth once a day  -- Indication: For GERD    tamsulosin 0.4 mg oral capsule  -- 1 cap(s) by mouth once a day  -- Indication: For BPH    Dilantin 100 mg oral capsule  -- 1 cap(s) by mouth 4 times a day  -- Indication: For seizure    citalopram 20 mg oral tablet  -- 1 tab(s) by mouth once a day  -- Indication: For depression    simvastatin 40 mg oral tablet  -- 1 tab(s) by mouth once a day  -- Indication: For High cholestrol    clopidogrel 75 mg oral tablet  -- 1 tab(s) by mouth once a day MDD:1  -- Indication: For CAD    ALPRAZolam 0.25 mg oral tablet  -- 1 tab(s) by mouth 4 times a day, As Needed  -- Indication: For anxiexty    ProAir HFA 90 mcg/inh inhalation aerosol  -- 2 puff(s) inhaled 4 times a day, As Needed   -- Indication: For bronspasm    amLODIPine 5 mg oral tablet  -- 1 tab(s) by mouth once a day  -- Indication: For Hypertension    montelukast 10 mg oral tablet  -- 1 tab(s) by mouth once a day  -- Indication: For asthma     Protonix 40 mg oral delayed release tablet  -- 1 tab(s) by mouth 2 times a day  -- Indication: For GERd    Centrum Silver oral tablet  -- 1 tab(s) by mouth once a day  -- Indication: For supplement     Vitamin B-12 500 mcg oral tablet  -- 1 tab(s) by mouth once a day  -- Indication: For supplement     folic acid 0.4 mg oral tablet  -- 1 tab(s) by mouth once a day  -- Indication: For supplement     Vitamin B6 100 mg oral tablet  -- 1 tab(s) by mouth once a day  -- Indication: For supplement

## 2017-08-08 NOTE — PROGRESS NOTE ADULT - ASSESSMENT
Patient with known CAD status post PCI with BMS on 6/30/2017, now with recurrence of angina, found to have 95% diag lesion, now status post POBA. The POBA does not require prolonging of the DAPT.  Patient remains on DAPT, statin, and ARB per primary cardiologist.    Plan will likely be to hold Plavix for planned gastric surgery. Continue ASA without interruption.    Discharge planning per primary team.  Outpatient follow-up with Leif Santiago MD.

## 2017-08-08 NOTE — DISCHARGE NOTE ADULT - HOSPITAL COURSE
CP,   H/O  CAD/  STENTS.     S/P CATH  ,  WITH  ANGIOPLASTY, YESTERDAY,  TODAY  WITH  CP,  NORMAL  EKG, PT TO  AMBULATE,  WILL ONLY  DC PT, IF  HE  IS  SYMPTOM FREE,  H/O  CA ESOPHAGUS,  AWAITING SURGERY CP,   H/O  CAD/  STENTS.     S/P CATH  ,  WITH  ANGIOPLASTY, YESTERDAY,  of  diagnal branch,   TODAY  WITH  CP,  NORMAL  EKG, PT TO  AMBULATE,  WILL ONLY  DC PT, IF  HE  IS  SYMPTOM FREE,  H/O  CA ESOPHAGUS,  AWAITING SURGERY

## 2017-08-11 ENCOUNTER — OUTPATIENT (OUTPATIENT)
Dept: OUTPATIENT SERVICES | Facility: HOSPITAL | Age: 82
LOS: 1 days | End: 2017-08-11

## 2017-08-11 VITALS
WEIGHT: 141.98 LBS | HEIGHT: 63 IN | DIASTOLIC BLOOD PRESSURE: 68 MMHG | SYSTOLIC BLOOD PRESSURE: 110 MMHG | OXYGEN SATURATION: 98 % | RESPIRATION RATE: 16 BRPM | HEART RATE: 75 BPM | TEMPERATURE: 97 F

## 2017-08-11 DIAGNOSIS — G40.909 EPILEPSY, UNSPECIFIED, NOT INTRACTABLE, WITHOUT STATUS EPILEPTICUS: ICD-10-CM

## 2017-08-11 DIAGNOSIS — C15.5 MALIGNANT NEOPLASM OF LOWER THIRD OF ESOPHAGUS: ICD-10-CM

## 2017-08-11 DIAGNOSIS — C15.9 MALIGNANT NEOPLASM OF ESOPHAGUS, UNSPECIFIED: ICD-10-CM

## 2017-08-11 DIAGNOSIS — Z96.649 PRESENCE OF UNSPECIFIED ARTIFICIAL HIP JOINT: Chronic | ICD-10-CM

## 2017-08-11 DIAGNOSIS — I25.10 ATHEROSCLEROTIC HEART DISEASE OF NATIVE CORONARY ARTERY WITHOUT ANGINA PECTORIS: ICD-10-CM

## 2017-08-11 DIAGNOSIS — Z98.890 OTHER SPECIFIED POSTPROCEDURAL STATES: Chronic | ICD-10-CM

## 2017-08-11 DIAGNOSIS — M12.9 ARTHROPATHY, UNSPECIFIED: Chronic | ICD-10-CM

## 2017-08-11 DIAGNOSIS — Z90.89 ACQUIRED ABSENCE OF OTHER ORGANS: Chronic | ICD-10-CM

## 2017-08-11 DIAGNOSIS — Z95.5 PRESENCE OF CORONARY ANGIOPLASTY IMPLANT AND GRAFT: Chronic | ICD-10-CM

## 2017-08-11 LAB
APTT BLD: 31.9 SEC — SIGNIFICANT CHANGE UP (ref 27.5–37.4)
BLD GP AB SCN SERPL QL: NEGATIVE — SIGNIFICANT CHANGE UP
BUN SERPL-MCNC: 23 MG/DL — SIGNIFICANT CHANGE UP (ref 7–23)
CALCIUM SERPL-MCNC: 9.7 MG/DL — SIGNIFICANT CHANGE UP (ref 8.4–10.5)
CHLORIDE SERPL-SCNC: 105 MMOL/L — SIGNIFICANT CHANGE UP (ref 98–107)
CO2 SERPL-SCNC: 24 MMOL/L — SIGNIFICANT CHANGE UP (ref 22–31)
CREAT SERPL-MCNC: 1.11 MG/DL — SIGNIFICANT CHANGE UP (ref 0.5–1.3)
GLUCOSE SERPL-MCNC: 80 MG/DL — SIGNIFICANT CHANGE UP (ref 70–99)
HCT VFR BLD CALC: 31.6 % — LOW (ref 39–50)
HGB BLD-MCNC: 10.2 G/DL — LOW (ref 13–17)
INR BLD: 1 — SIGNIFICANT CHANGE UP (ref 0.88–1.17)
MCHC RBC-ENTMCNC: 32.2 PG — SIGNIFICANT CHANGE UP (ref 27–34)
MCHC RBC-ENTMCNC: 32.3 % — SIGNIFICANT CHANGE UP (ref 32–36)
MCV RBC AUTO: 99.7 FL — SIGNIFICANT CHANGE UP (ref 80–100)
NRBC # FLD: 0 — SIGNIFICANT CHANGE UP
PLATELET # BLD AUTO: 203 K/UL — SIGNIFICANT CHANGE UP (ref 150–400)
PMV BLD: 9.2 FL — SIGNIFICANT CHANGE UP (ref 7–13)
POTASSIUM SERPL-MCNC: 3.9 MMOL/L — SIGNIFICANT CHANGE UP (ref 3.5–5.3)
POTASSIUM SERPL-SCNC: 3.9 MMOL/L — SIGNIFICANT CHANGE UP (ref 3.5–5.3)
PROTHROM AB SERPL-ACNC: 11.2 SEC — SIGNIFICANT CHANGE UP (ref 9.8–13.1)
RBC # BLD: 3.17 M/UL — LOW (ref 4.2–5.8)
RBC # FLD: 13.2 % — SIGNIFICANT CHANGE UP (ref 10.3–14.5)
RH IG SCN BLD-IMP: POSITIVE — SIGNIFICANT CHANGE UP
SODIUM SERPL-SCNC: 142 MMOL/L — SIGNIFICANT CHANGE UP (ref 135–145)
WBC # BLD: 9.26 K/UL — SIGNIFICANT CHANGE UP (ref 3.8–10.5)
WBC # FLD AUTO: 9.26 K/UL — SIGNIFICANT CHANGE UP (ref 3.8–10.5)

## 2017-08-11 PROCEDURE — 93010 ELECTROCARDIOGRAM REPORT: CPT

## 2017-08-11 RX ORDER — MULTIVIT-MIN/FERROUS GLUCONATE 9 MG/15 ML
1 LIQUID (ML) ORAL
Qty: 0 | Refills: 0 | COMMUNITY

## 2017-08-11 RX ORDER — SODIUM CHLORIDE 9 MG/ML
1000 INJECTION, SOLUTION INTRAVENOUS
Qty: 0 | Refills: 0 | Status: DISCONTINUED | OUTPATIENT
Start: 2017-08-14 | End: 2017-08-16

## 2017-08-11 RX ORDER — SIMVASTATIN 20 MG/1
1 TABLET, FILM COATED ORAL
Qty: 0 | Refills: 0 | COMMUNITY

## 2017-08-11 RX ORDER — TAMSULOSIN HYDROCHLORIDE 0.4 MG/1
1 CAPSULE ORAL
Qty: 0 | Refills: 0 | COMMUNITY

## 2017-08-11 RX ORDER — MONTELUKAST 4 MG/1
1 TABLET, CHEWABLE ORAL
Qty: 0 | Refills: 0 | COMMUNITY

## 2017-08-11 RX ORDER — ALPRAZOLAM 0.25 MG
1 TABLET ORAL
Qty: 0 | Refills: 0 | COMMUNITY

## 2017-08-11 RX ORDER — FINASTERIDE 5 MG/1
1 TABLET, FILM COATED ORAL
Qty: 0 | Refills: 0 | COMMUNITY

## 2017-08-11 RX ORDER — OLMESARTAN MEDOXOMIL 5 MG/1
1 TABLET, FILM COATED ORAL
Qty: 0 | Refills: 0 | COMMUNITY

## 2017-08-11 RX ORDER — AMLODIPINE BESYLATE 2.5 MG/1
1 TABLET ORAL
Qty: 0 | Refills: 0 | COMMUNITY

## 2017-08-11 RX ORDER — HEPARIN SODIUM 5000 [USP'U]/ML
5000 INJECTION INTRAVENOUS; SUBCUTANEOUS ONCE
Qty: 0 | Refills: 0 | Status: COMPLETED | OUTPATIENT
Start: 2017-08-14 | End: 2017-08-14

## 2017-08-11 NOTE — H&P PST ADULT - PMH
Anxiety    Asthma    BCC (basal cell carcinoma)  skin  BPH (benign prostatic hyperplasia)    Cerebrovascular accident (CVA)    Chronic allergic rhinitis    Coronary artery disease  s/p 3 stents on June 30, 2017 at [place] Dr. Lavlele Reid  Esophagus cancer    Former smoker    Former smoker, stopped smoking in distant past    HTN (hypertension)    Hyperlipidemia    OA (osteoarthritis) of knee  right  Rheumatoid arthritis    Seizure disorder Anxiety    Asthma    BCC (basal cell carcinoma)  skin  BPH (benign prostatic hyperplasia)    Cerebrovascular accident (CVA)    Chronic allergic rhinitis    Coronary artery disease  s/p 3 stents on June 30, 2017 at [place] Dr. Lavelle Reid  Esophagus cancer    Former smoker    Former smoker, stopped smoking in distant past    HTN (hypertension)    Hyperlipidemia    OA (osteoarthritis) of knee  right  Rheumatoid arthritis    Seizure disorder  1 episode approximately 15 yrs ago

## 2017-08-11 NOTE — H&P PST ADULT - FAMILY HISTORY
Mother  Still living? No  FH: CAD (coronary artery disease), Age at diagnosis: Age Unknown     Father  Still living? No  Family history of pulmonary embolism, Age at diagnosis: Age Unknown     Sibling  Still living? No  Family history of heart attack, Age at diagnosis: Age Unknown

## 2017-08-11 NOTE — H&P PST ADULT - ABILITY TO HEAR (WITH HEARING AID OR HEARING APPLIANCE IF NORMALLY USED):
Mildly to Moderately Impaired: difficulty hearing in some environments or speaker may need to increase volume or speak distinctly/b/l hearing aid

## 2017-08-11 NOTE — H&P PST ADULT - PSH
H/O heart artery stent  June 30, 2017  History of hernia repair    History of tonsillectomy    History of total hip replacement  left  Knee arthropathy  left

## 2017-08-11 NOTE — H&P PST ADULT - NEGATIVE BREAST SYMPTOMS
no breast tenderness R/no breast lump R/no breast tenderness L/no breast lump L/no nipple discharge L/no nipple discharge R

## 2017-08-11 NOTE — H&P PST ADULT - PROBLEM SELECTOR PLAN 3
Pt s/p 3 bare metal stent placed on 06/30/17.  As per pt last dose of Plavix 08/10/17 and continue Aspirin as per Dr. Weinstein.  Pt went for cardiac clearance on 08/10 with Dr. Santiago will call for clearance.

## 2017-08-11 NOTE — H&P PST ADULT - PROBLEM SELECTOR PLAN 1
Scheduled for Esophagogastroduodenoscopy, Thoracoscopic Mobilization of Esophagus, Laparoscopic Mobilization of Stomach, Robotic Assisted Laparoscopic Esophagogastrectomy Feeding Jejunostomy Tube, Possible Open on 08/14/17.  Lab results pending.  Preop, famotidine and chlorhexidine instructions provided and questions addressed.

## 2017-08-11 NOTE — H&P PST ADULT - LYMPHATIC
posterior cervical R/anterior cervical R/supraclavicular L/supraclavicular R/posterior cervical L/anterior cervical L

## 2017-08-11 NOTE — H&P PST ADULT - NSANTHOSAYNRD_GEN_A_CORE
No. SOFIA screening performed.  STOP BANG Legend: 0-2 = LOW Risk; 3-4 = INTERMEDIATE Risk; 5-8 = HIGH Risk

## 2017-08-11 NOTE — H&P PST ADULT - GASTROINTESTINAL DETAILS
bowel sounds normal/no organomegaly/nontender/no distention/soft/no bruit/no masses palpable/no rebound tenderness/no rigidity/no guarding

## 2017-08-11 NOTE — H&P PST ADULT - HISTORY OF PRESENT ILLNESS
82 yr old male with hx of cancer of esophagus s/p chemo and radiation therapy presents for preop evaluation.  Pt is now scheduled for Esophagogastroduodenoscopy, Throacoscopic Mobilization of Stomach 82 yr old male with hx of cancer of esophagus s/p chemo and radiation therapy presents for preop evaluation.  Pt is now scheduled for Esophagogastroduodenoscopy, Thoracoscopic Mobilization of Stomach, Robotic Assisted Laparoscopic Esophagogastrectomy Feeding Jejunostomy, Tube, Possible Open on 07/14/17.  Of note upon physical exam for medical/cardiac clearance pt was sent for coronary ct valenzuela and was noted have blockage, pt then had angiogram and bare metal stents x 3 placed June 30, 2017.

## 2017-08-14 ENCOUNTER — INPATIENT (INPATIENT)
Facility: HOSPITAL | Age: 82
LOS: 13 days | Discharge: NURSING, REHAB OR LONG-TERM CARE | End: 2017-08-28
Attending: THORACIC SURGERY (CARDIOTHORACIC VASCULAR SURGERY) | Admitting: THORACIC SURGERY (CARDIOTHORACIC VASCULAR SURGERY)
Payer: MEDICARE

## 2017-08-14 ENCOUNTER — TRANSCRIPTION ENCOUNTER (OUTPATIENT)
Age: 82
End: 2017-08-14

## 2017-08-14 ENCOUNTER — RESULT REVIEW (OUTPATIENT)
Age: 82
End: 2017-08-14

## 2017-08-14 ENCOUNTER — APPOINTMENT (OUTPATIENT)
Dept: THORACIC SURGERY | Facility: HOSPITAL | Age: 82
End: 2017-08-14
Payer: MEDICARE

## 2017-08-14 ENCOUNTER — APPOINTMENT (OUTPATIENT)
Dept: SURGICAL ONCOLOGY | Facility: HOSPITAL | Age: 82
End: 2017-08-14

## 2017-08-14 VITALS
DIASTOLIC BLOOD PRESSURE: 69 MMHG | TEMPERATURE: 98 F | RESPIRATION RATE: 16 BRPM | OXYGEN SATURATION: 97 % | SYSTOLIC BLOOD PRESSURE: 144 MMHG | HEART RATE: 72 BPM | HEIGHT: 63 IN | WEIGHT: 141.98 LBS

## 2017-08-14 DIAGNOSIS — Z96.649 PRESENCE OF UNSPECIFIED ARTIFICIAL HIP JOINT: Chronic | ICD-10-CM

## 2017-08-14 DIAGNOSIS — Z98.890 OTHER SPECIFIED POSTPROCEDURAL STATES: Chronic | ICD-10-CM

## 2017-08-14 DIAGNOSIS — C15.5 MALIGNANT NEOPLASM OF LOWER THIRD OF ESOPHAGUS: ICD-10-CM

## 2017-08-14 DIAGNOSIS — M12.9 ARTHROPATHY, UNSPECIFIED: Chronic | ICD-10-CM

## 2017-08-14 DIAGNOSIS — Z90.89 ACQUIRED ABSENCE OF OTHER ORGANS: Chronic | ICD-10-CM

## 2017-08-14 DIAGNOSIS — Z95.5 PRESENCE OF CORONARY ANGIOPLASTY IMPLANT AND GRAFT: Chronic | ICD-10-CM

## 2017-08-14 LAB
BASE EXCESS BLDA CALC-SCNC: -0.4 MMOL/L — SIGNIFICANT CHANGE UP
BASE EXCESS BLDA CALC-SCNC: -0.8 MMOL/L — SIGNIFICANT CHANGE UP
BASE EXCESS BLDA CALC-SCNC: 0.6 MMOL/L — SIGNIFICANT CHANGE UP
BASOPHILS # BLD AUTO: 0.01 K/UL — SIGNIFICANT CHANGE UP (ref 0–0.2)
BASOPHILS NFR BLD AUTO: 0.1 % — SIGNIFICANT CHANGE UP (ref 0–2)
BUN SERPL-MCNC: 14 MG/DL — SIGNIFICANT CHANGE UP (ref 7–23)
CA-I BLDA-SCNC: 1.29 MMOL/L — SIGNIFICANT CHANGE UP (ref 1.15–1.29)
CA-I BLDA-SCNC: 1.31 MMOL/L — HIGH (ref 1.15–1.29)
CALCIUM SERPL-MCNC: 8.8 MG/DL — SIGNIFICANT CHANGE UP (ref 8.4–10.5)
CHLORIDE SERPL-SCNC: 104 MMOL/L — SIGNIFICANT CHANGE UP (ref 98–107)
CO2 SERPL-SCNC: 24 MMOL/L — SIGNIFICANT CHANGE UP (ref 22–31)
CREAT SERPL-MCNC: 1.05 MG/DL — SIGNIFICANT CHANGE UP (ref 0.5–1.3)
EOSINOPHIL # BLD AUTO: 0 K/UL — SIGNIFICANT CHANGE UP (ref 0–0.5)
EOSINOPHIL NFR BLD AUTO: 0 % — SIGNIFICANT CHANGE UP (ref 0–6)
GLUCOSE BLDA-MCNC: 156 MG/DL — HIGH (ref 70–99)
GLUCOSE BLDA-MCNC: 162 MG/DL — HIGH (ref 70–99)
GLUCOSE SERPL-MCNC: 119 MG/DL — HIGH (ref 70–99)
HCO3 BLDA-SCNC: 24 MMOL/L — SIGNIFICANT CHANGE UP (ref 22–26)
HCO3 BLDA-SCNC: 24 MMOL/L — SIGNIFICANT CHANGE UP (ref 22–26)
HCO3 BLDA-SCNC: 25 MMOL/L — SIGNIFICANT CHANGE UP (ref 22–26)
HCT VFR BLD CALC: 32.5 % — LOW (ref 39–50)
HCT VFR BLDA CALC: 27.7 % — LOW (ref 39–51)
HCT VFR BLDA CALC: 29.9 % — LOW (ref 39–51)
HGB BLD-MCNC: 10.7 G/DL — LOW (ref 13–17)
HGB BLDA-MCNC: 8.9 G/DL — LOW (ref 13–17)
HGB BLDA-MCNC: 9.6 G/DL — LOW (ref 13–17)
IMM GRANULOCYTES # BLD AUTO: 0.03 # — SIGNIFICANT CHANGE UP
IMM GRANULOCYTES NFR BLD AUTO: 0.3 % — SIGNIFICANT CHANGE UP (ref 0–1.5)
LYMPHOCYTES # BLD AUTO: 0.4 K/UL — LOW (ref 1–3.3)
LYMPHOCYTES # BLD AUTO: 3.7 % — LOW (ref 13–44)
MCHC RBC-ENTMCNC: 32.3 PG — SIGNIFICANT CHANGE UP (ref 27–34)
MCHC RBC-ENTMCNC: 32.9 % — SIGNIFICANT CHANGE UP (ref 32–36)
MCV RBC AUTO: 98.2 FL — SIGNIFICANT CHANGE UP (ref 80–100)
MONOCYTES # BLD AUTO: 0.55 K/UL — SIGNIFICANT CHANGE UP (ref 0–0.9)
MONOCYTES NFR BLD AUTO: 5.1 % — SIGNIFICANT CHANGE UP (ref 2–14)
NEUTROPHILS # BLD AUTO: 9.85 K/UL — HIGH (ref 1.8–7.4)
NEUTROPHILS NFR BLD AUTO: 90.8 % — HIGH (ref 43–77)
NRBC # FLD: 0 — SIGNIFICANT CHANGE UP
PCO2 BLDA: 45 MMHG — SIGNIFICANT CHANGE UP (ref 35–48)
PCO2 BLDA: 47 MMHG — SIGNIFICANT CHANGE UP (ref 35–48)
PCO2 BLDA: 50 MMHG — HIGH (ref 35–48)
PH BLDA: 7.33 PH — LOW (ref 7.35–7.45)
PH BLDA: 7.34 PH — LOW (ref 7.35–7.45)
PH BLDA: 7.35 PH — SIGNIFICANT CHANGE UP (ref 7.35–7.45)
PLATELET # BLD AUTO: 213 K/UL — SIGNIFICANT CHANGE UP (ref 150–400)
PMV BLD: 9 FL — SIGNIFICANT CHANGE UP (ref 7–13)
PO2 BLDA: 103 MMHG — SIGNIFICANT CHANGE UP (ref 83–108)
PO2 BLDA: 113 MMHG — HIGH (ref 83–108)
PO2 BLDA: 234 MMHG — HIGH (ref 83–108)
POTASSIUM BLDA-SCNC: 3.5 MMOL/L — SIGNIFICANT CHANGE UP (ref 3.4–4.5)
POTASSIUM BLDA-SCNC: 3.7 MMOL/L — SIGNIFICANT CHANGE UP (ref 3.4–4.5)
POTASSIUM SERPL-MCNC: 4.1 MMOL/L — SIGNIFICANT CHANGE UP (ref 3.5–5.3)
POTASSIUM SERPL-SCNC: 4.1 MMOL/L — SIGNIFICANT CHANGE UP (ref 3.5–5.3)
RBC # BLD: 3.31 M/UL — LOW (ref 4.2–5.8)
RBC # FLD: 12.9 % — SIGNIFICANT CHANGE UP (ref 10.3–14.5)
REVIEW TO FOLLOW: YES — SIGNIFICANT CHANGE UP
RH IG SCN BLD-IMP: POSITIVE — SIGNIFICANT CHANGE UP
SAO2 % BLDA: 97.3 % — SIGNIFICANT CHANGE UP (ref 95–99)
SAO2 % BLDA: 98.6 % — SIGNIFICANT CHANGE UP (ref 95–99)
SAO2 % BLDA: 99.5 % — HIGH (ref 95–99)
SODIUM BLDA-SCNC: 140 MMOL/L — SIGNIFICANT CHANGE UP (ref 136–146)
SODIUM BLDA-SCNC: 140 MMOL/L — SIGNIFICANT CHANGE UP (ref 136–146)
SODIUM SERPL-SCNC: 142 MMOL/L — SIGNIFICANT CHANGE UP (ref 135–145)
WBC # BLD: 10.84 K/UL — HIGH (ref 3.8–10.5)
WBC # FLD AUTO: 10.84 K/UL — HIGH (ref 3.8–10.5)

## 2017-08-14 PROCEDURE — 88309 TISSUE EXAM BY PATHOLOGIST: CPT | Mod: 26

## 2017-08-14 PROCEDURE — 88307 TISSUE EXAM BY PATHOLOGIST: CPT | Mod: 26

## 2017-08-14 PROCEDURE — 99291 CRITICAL CARE FIRST HOUR: CPT

## 2017-08-14 PROCEDURE — 32666 THORACOSCOPY W/WEDGE RESECT: CPT

## 2017-08-14 PROCEDURE — 32674 THORACOSCOPY LYMPH NODE EXC: CPT

## 2017-08-14 PROCEDURE — 43235 EGD DIAGNOSTIC BRUSH WASH: CPT

## 2017-08-14 PROCEDURE — 88305 TISSUE EXAM BY PATHOLOGIST: CPT | Mod: 26

## 2017-08-14 PROCEDURE — 38747 REMOVE ABDOMINAL LYMPH NODES: CPT | Mod: 59

## 2017-08-14 PROCEDURE — S2900 ROBOTIC SURGICAL SYSTEM: CPT | Mod: NC

## 2017-08-14 PROCEDURE — 44186 LAP JEJUNOSTOMY: CPT | Mod: 62

## 2017-08-14 PROCEDURE — 43117 PARTIAL REMOVAL OF ESOPHAGUS: CPT | Mod: 62

## 2017-08-14 PROCEDURE — 88360 TUMOR IMMUNOHISTOCHEM/MANUAL: CPT | Mod: 26

## 2017-08-14 PROCEDURE — 71010: CPT | Mod: 26

## 2017-08-14 PROCEDURE — 88331 PATH CONSLTJ SURG 1 BLK 1SPC: CPT | Mod: 26

## 2017-08-14 RX ORDER — HYDROMORPHONE HYDROCHLORIDE 2 MG/ML
30 INJECTION INTRAMUSCULAR; INTRAVENOUS; SUBCUTANEOUS
Qty: 0 | Refills: 0 | Status: DISCONTINUED | OUTPATIENT
Start: 2017-08-14 | End: 2017-08-21

## 2017-08-14 RX ORDER — ALBUTEROL 90 UG/1
2 AEROSOL, METERED ORAL EVERY 6 HOURS
Qty: 0 | Refills: 0 | Status: DISCONTINUED | OUTPATIENT
Start: 2017-08-14 | End: 2017-08-19

## 2017-08-14 RX ORDER — HEPARIN SODIUM 5000 [USP'U]/ML
5000 INJECTION INTRAVENOUS; SUBCUTANEOUS EVERY 8 HOURS
Qty: 0 | Refills: 0 | Status: DISCONTINUED | OUTPATIENT
Start: 2017-08-14 | End: 2017-08-27

## 2017-08-14 RX ORDER — DEXAMETHASONE 0.5 MG/5ML
1 ELIXIR ORAL DAILY
Qty: 0 | Refills: 0 | Status: DISCONTINUED | OUTPATIENT
Start: 2017-08-14 | End: 2017-08-27

## 2017-08-14 RX ORDER — PHENYLEPHRINE HYDROCHLORIDE 10 MG/ML
0.87 INJECTION INTRAVENOUS
Qty: 80 | Refills: 0 | Status: DISCONTINUED | OUTPATIENT
Start: 2017-08-14 | End: 2017-08-16

## 2017-08-14 RX ORDER — HYDROMORPHONE HYDROCHLORIDE 2 MG/ML
0.5 INJECTION INTRAMUSCULAR; INTRAVENOUS; SUBCUTANEOUS
Qty: 0 | Refills: 0 | Status: DISCONTINUED | OUTPATIENT
Start: 2017-08-14 | End: 2017-08-14

## 2017-08-14 RX ORDER — FENTANYL CITRATE 50 UG/ML
0.5 INJECTION INTRAVENOUS
Qty: 2500 | Refills: 0 | Status: DISCONTINUED | OUTPATIENT
Start: 2017-08-14 | End: 2017-08-15

## 2017-08-14 RX ORDER — PANTOPRAZOLE SODIUM 20 MG/1
40 TABLET, DELAYED RELEASE ORAL DAILY
Qty: 0 | Refills: 0 | Status: DISCONTINUED | OUTPATIENT
Start: 2017-08-14 | End: 2017-08-27

## 2017-08-14 RX ORDER — NALOXONE HYDROCHLORIDE 4 MG/.1ML
0.1 SPRAY NASAL
Qty: 0 | Refills: 0 | Status: DISCONTINUED | OUTPATIENT
Start: 2017-08-14 | End: 2017-08-23

## 2017-08-14 RX ORDER — IPRATROPIUM/ALBUTEROL SULFATE 18-103MCG
3 AEROSOL WITH ADAPTER (GRAM) INHALATION EVERY 6 HOURS
Qty: 0 | Refills: 0 | Status: DISCONTINUED | OUTPATIENT
Start: 2017-08-14 | End: 2017-08-14

## 2017-08-14 RX ORDER — ONDANSETRON 8 MG/1
4 TABLET, FILM COATED ORAL EVERY 6 HOURS
Qty: 0 | Refills: 0 | Status: DISCONTINUED | OUTPATIENT
Start: 2017-08-14 | End: 2017-08-28

## 2017-08-14 RX ORDER — CHLORHEXIDINE GLUCONATE 213 G/1000ML
1 SOLUTION TOPICAL DAILY
Qty: 0 | Refills: 0 | Status: DISCONTINUED | OUTPATIENT
Start: 2017-08-14 | End: 2017-08-23

## 2017-08-14 RX ORDER — IPRATROPIUM BROMIDE 0.2 MG/ML
1 SOLUTION, NON-ORAL INHALATION EVERY 6 HOURS
Qty: 0 | Refills: 0 | Status: DISCONTINUED | OUTPATIENT
Start: 2017-08-14 | End: 2017-08-19

## 2017-08-14 RX ORDER — HYDROCORTISONE 20 MG
100 TABLET ORAL ONCE
Qty: 0 | Refills: 0 | Status: COMPLETED | OUTPATIENT
Start: 2017-08-14 | End: 2017-08-07

## 2017-08-14 RX ORDER — PROPOFOL 10 MG/ML
25.88 INJECTION, EMULSION INTRAVENOUS
Qty: 1000 | Refills: 0 | Status: DISCONTINUED | OUTPATIENT
Start: 2017-08-14 | End: 2017-08-15

## 2017-08-14 RX ORDER — ASPIRIN/CALCIUM CARB/MAGNESIUM 324 MG
300 TABLET ORAL DAILY
Qty: 0 | Refills: 0 | Status: DISCONTINUED | OUTPATIENT
Start: 2017-08-15 | End: 2017-08-27

## 2017-08-14 RX ADMIN — FENTANYL CITRATE 0.5 MICROGRAM(S)/KG/HR: 50 INJECTION INTRAVENOUS at 22:09

## 2017-08-14 RX ADMIN — PROPOFOL 10 MICROGRAM(S)/KG/MIN: 10 INJECTION, EMULSION INTRAVENOUS at 21:34

## 2017-08-14 RX ADMIN — Medication 3 MILLILITER(S): at 22:16

## 2017-08-14 RX ADMIN — FENTANYL CITRATE 6.44 MICROGRAM(S)/KG/HR: 50 INJECTION INTRAVENOUS at 21:53

## 2017-08-14 RX ADMIN — PHENYLEPHRINE HYDROCHLORIDE 21 MICROGRAM(S)/KG/MIN: 10 INJECTION INTRAVENOUS at 21:34

## 2017-08-14 RX ADMIN — PANTOPRAZOLE SODIUM 40 MILLIGRAM(S): 20 TABLET, DELAYED RELEASE ORAL at 22:09

## 2017-08-14 RX ADMIN — HEPARIN SODIUM 5000 UNIT(S): 5000 INJECTION INTRAVENOUS; SUBCUTANEOUS at 22:05

## 2017-08-14 RX ADMIN — HEPARIN SODIUM 5000 UNIT(S): 5000 INJECTION INTRAVENOUS; SUBCUTANEOUS at 10:12

## 2017-08-14 RX ADMIN — SODIUM CHLORIDE 125 MILLILITER(S): 9 INJECTION, SOLUTION INTRAVENOUS at 21:33

## 2017-08-14 NOTE — PROGRESS NOTE ADULT - SUBJECTIVE AND OBJECTIVE BOX
MEJIA LATIF            MRN-[id number]         penicillin (Rash)                 HPI:  82 yr old male with hx of cancer of esophagus s/p chemo and radiation therapy presents for preop evaluation.  Pt is now scheduled for Esophagogastroduodenoscopy, Thoracoscopic Mobilization of Stomach, Robotic Assisted Laparoscopic Esophagogastrectomy Feeding Jejunostomy, Tube, Possible Open on 07/14/17.  Of note upon physical exam for medical/cardiac clearance pt was sent for coronary ct valenzuela and was noted have blockage, pt then had angiogram and bare metal stents x 3 placed June 30, 2017. (11 Aug 2017 09:35)      Procedure: Esophagogastrectomy / J-tube  POD# 0    Issues:  Hypotension  Respiratory failure  Esophageal cancer  Asthma  Postop pain  CAD s/p stents  Drips:  Ryan 0.8mcg/kg/hr  Propofol  Fentanyl    Home Medications:   * Patient Currently Takes Medications as of 11-Aug-2017 10:25 documented in Prescription Writer  · 	aspirin 81 mg oral tablet: Last Dose Taken:  , 1 tab(s) orally once a day  last  8/11 dose   · 	ProAir HFA 90 mcg/inh inhalation aerosol: 2 puff(s) inhaled 4 times a day, As Needed   · 	Vitamin B-12 500 mcg oral tablet: 1 tab(s) orally once a day  · 	amLODIPine 5 mg oral tablet: Last Dose Taken:  , 1 tab(s) orally once a day in pm  · 	simvastatin 40 mg oral tablet: Last Dose Taken:  , 1 tab(s) orally once a day in pm  · 	tamsulosin 0.4 mg oral capsule: Last Dose Taken:  , 1 cap(s) orally once a day in pm  · 	Zetia 10 mg oral tablet: Last Dose Taken:  , 1 tab(s) orally once a day in pm  · 	ALPRAZolam 0.25 mg oral tablet: Last Dose Taken:  , 2 tab(s) orally 2 times a day  · 	olmesartan 20 mg oral tablet: Last Dose Taken:  , 1 tab(s) orally once a day in pm  · 	montelukast 10 mg oral tablet: Last Dose Taken:  , 1 tab(s) orally once a day in pm  · 	Dilantin 100 mg oral capsule: Last Dose Taken:  , 2 cap(s) orally 2 times a day  · 	predniSONE 5 mg oral tablet: Last Dose Taken:  , 1 tab(s) orally once a day in am  · 	finasteride 5 mg oral tablet: Last Dose Taken:  , 1 tab(s) orally once a day in pm  · 	Centrum Silver oral tablet: Last Dose Taken:  , 1 tab(s) orally once a day  last dose 8/11  · 	Plavix 75 mg oral tablet: Last Dose Taken:  , 1 tab(s) orally once a day  last dose 8/10  · 	folic acid 0.4 mg oral tablet: 1 tab(s) orally once a day  · 	Vitamin B6 100 mg oral tablet: 1 tab(s) orally once a day    PAST MEDICAL & SURGICAL HISTORY:  Coronary artery disease: s/p 3 stents on June 30, 2017 at [facility] Lavelle Reid  OA (osteoarthritis) of knee: right  Former smoker, stopped smoking in distant past  Former smoker  Rheumatoid arthritis  Seizure disorder: 1 episode approximately 15 yrs ago  Asthma  Hyperlipidemia  BPH (benign prostatic hyperplasia)  HTN (hypertension)  Esophagus cancer  Chronic allergic rhinitis  BCC (basal cell carcinoma): skin  Cerebrovascular accident (CVA)  Anxiety  History of tonsillectomy  H/O heart artery stent: June 30, 2017  Knee arthropathy: left  History of total hip replacement: left  History of hernia repair        ICU Vital Signs Last 24 Hrs  T(C): 36.3 (14 Aug 2017 21:00), Max: 36.6 (14 Aug 2017 09:47)  T(F): 97.3 (14 Aug 2017 21:00), Max: 97.8 (14 Aug 2017 09:47)  HR: 74 (14 Aug 2017 22:16) (65 - 74)  BP: 140/60 (14 Aug 2017 21:45) (103/43 - 144/69)  BP(mean): 80 (14 Aug 2017 21:45) (59 - 81)  ABP: 153/68 (14 Aug 2017 22:00) (108/60 - 157/74)  ABP(mean): 102 (14 Aug 2017 22:00) (79 - 107)  RR: 21 (14 Aug 2017 22:00) (12 - 21)  SpO2: 100% (14 Aug 2017 22:16) (97% - 100%)    I&O's Summary    14 Aug 2017 07:01  -  14 Aug 2017 22:39  --------------------------------------------------------  IN: 296.4 mL / OUT: 65 mL / NET: 231.4 mL      CAPILLARY BLOOD GLUCOSE      MEDICATIONS  (STANDING):  lactated ringers. 1000 milliLiter(s) (125 mL/Hr) IV Continuous <Continuous>  HYDROmorphone PCA (1 mG/mL) 30 milliLiter(s) PCA Continuous PCA Continuous  heparin  Injectable 5000 Unit(s) SubCutaneous every 8 hours  phenytoin  IVPB 200 milliGRAM(s) IV Intermittent two times a day  phenylephrine    Infusion 0.87 MICROgram(s)/kG/Min (21 mL/Hr) IV Continuous <Continuous>  propofol Infusion 25.88 MICROgram(s)/kG/Min (10 mL/Hr) IV Continuous <Continuous>  dexamethasone  Injectable 1 milliGRAM(s) IV Push daily  ALBUTerol/ipratropium for Nebulization 3 milliLiter(s) Nebulizer every 6 hours  pantoprazole  Injectable 40 milliGRAM(s) IV Push daily  fentaNYL   Infusion 1 MICROgram(s)/kG/Hr (6.44 mL/Hr) IV Continuous <Continuous>    MEDICATIONS  (PRN):  HYDROmorphone PCA (1 mG/mL) Rescue Clinician Bolus 0.5 milliGRAM(s) IV Push every 15 minutes PRN for Pain Scale GREATER THAN 6  naloxone Injectable 0.1 milliGRAM(s) IV Push every 3 minutes PRN For ANY of the following changes in patient status:  A. RR LESS THAN 10 breaths per minute, B. Oxygen saturation LESS THAN 90%, C. Sedation score of 6  ondansetron Injectable 4 milliGRAM(s) IV Push every 6 hours PRN Nausea      Physical exam:                             General:               Pt is sedated, on vent support                                                 Neuro:                  Sedated                             Cardiovascular:     S1 & S2, regular                           Respiratory:         Air entry is fair and equal on both sides, has bilateral conducted sounds, bilateral subcutaneous emphysema                           GI:                        Soft, nondistended, no BS                            Ext:                      No cyanosis or edema     Labs:                                                               CXR: Extensive subcutaneous emphysema, NGT tip in midchest, ETT & CT in position    Plan:    General: 82yMale  s/p Esophagogastrectomy POD# 0, brought to ICU intubated and sedated.                             Neuro:                                         Pain control with Fentanyl drip    Seizure disorder: On Dilantin IV 200bid                            Cardiovascular:                                          Continue hemodynamic monitoring.    Hypotension: Titrate Ryan to MAP>65    Continue IVF 125CC/hr    CAD: Start ASA 300mg [place] from AM, No plavix                            Respiratory:                                         Full mechanical vent support, OF--50%-5       Pulmonary toilet                                         Monitor chest tube output                                         Chest tube to suction                                                                  Continue bronchodilators, pulmonary toilet                            GI                                         NPO                                         Continue GI prophylaxis with Protonix                                         Continue Zofran / Reglan for nausea - PRN	                                         NGT & J-tube flushes Q4hrs                            Renal:                                         Continue LR 125CC/hr                                         Monitor I/Os and electrolytes                                         López                                                  Hem/ Onc:                                                                                  Monitor chest tube output &  signs of bleeding.                                          Follow CBC in AM                           Infectious disease:                                            No signs of infection. Monitor for fever / leukocytosis.                                          All surgical incision / chest tube  sites look clean                            Endocrine                                             Continue Accu-Checks with coverage       Stress dose steroids Hydrocortisone 100mg IV  tonight and Decadron 1mg IV from AM        Pertinent clinical, laboratory, radiographic, hemodynamic, echocardiographic, respiratory data, microbiologic data and chart were reviewed and analyzed frequently throughout the course of the day and night  Patient seen, examined and plan discussed with CT Surgeon  / CTICU team during rounds.    Pt's status discussed with family at bedside, updated status    I have spent 45 minutes of critical care time with this pt between 9pm  and 12am        Jose Sharif MD

## 2017-08-14 NOTE — BRIEF OPERATIVE NOTE - POST-OP DX
Malignant neoplasm of lower third of esophagus  08/14/2017    Active  Patria Barger
Malignant neoplasm of lower third of esophagus  08/14/2017    Active  Patria Barger

## 2017-08-14 NOTE — ASU PREOP CHECKLIST - COMMENTS
pt took alprazolam, dilantin, prednison and famotidine 7 am with a sip of  water pt took alprazolam, dilantin, prednisone and famotidine 7 am with a sip of  water

## 2017-08-14 NOTE — ASU PATIENT PROFILE, ADULT - PMH
Anxiety    Asthma    BCC (basal cell carcinoma)  skin  BPH (benign prostatic hyperplasia)    Cerebrovascular accident (CVA)    Chronic allergic rhinitis    Coronary artery disease  s/p 3 stents on June 30, 2017 at [place]. Lavelle Reid  Esophagus cancer    Former smoker    Former smoker, stopped smoking in distant past    HTN (hypertension)    Hyperlipidemia    OA (osteoarthritis) of knee  right  Rheumatoid arthritis    Seizure disorder  1 episode approximately 15 yrs ago

## 2017-08-15 LAB
ANISOCYTOSIS BLD QL: SLIGHT — SIGNIFICANT CHANGE UP
BASE EXCESS BLDA CALC-SCNC: -0.3 MMOL/L — SIGNIFICANT CHANGE UP
BASE EXCESS BLDA CALC-SCNC: 0.2 MMOL/L — SIGNIFICANT CHANGE UP
BASOPHILS # BLD AUTO: 0 K/UL — SIGNIFICANT CHANGE UP (ref 0–0.2)
BASOPHILS NFR BLD AUTO: 0 % — SIGNIFICANT CHANGE UP (ref 0–2)
BASOPHILS NFR SPEC: 0 % — SIGNIFICANT CHANGE UP (ref 0–2)
BLD GP AB SCN SERPL QL: NEGATIVE — SIGNIFICANT CHANGE UP
BUN SERPL-MCNC: 13 MG/DL — SIGNIFICANT CHANGE UP (ref 7–23)
CALCIUM SERPL-MCNC: 8.8 MG/DL — SIGNIFICANT CHANGE UP (ref 8.4–10.5)
CHLORIDE SERPL-SCNC: 103 MMOL/L — SIGNIFICANT CHANGE UP (ref 98–107)
CO2 SERPL-SCNC: 24 MMOL/L — SIGNIFICANT CHANGE UP (ref 22–31)
CREAT SERPL-MCNC: 1.04 MG/DL — SIGNIFICANT CHANGE UP (ref 0.5–1.3)
EOSINOPHIL # BLD AUTO: 0 K/UL — SIGNIFICANT CHANGE UP (ref 0–0.5)
EOSINOPHIL NFR BLD AUTO: 0 % — SIGNIFICANT CHANGE UP (ref 0–6)
EOSINOPHIL NFR FLD: 0 % — SIGNIFICANT CHANGE UP (ref 0–6)
GLUCOSE SERPL-MCNC: 128 MG/DL — HIGH (ref 70–99)
HCO3 BLDA-SCNC: 24 MMOL/L — SIGNIFICANT CHANGE UP (ref 22–26)
HCO3 BLDA-SCNC: 24 MMOL/L — SIGNIFICANT CHANGE UP (ref 22–26)
HCT VFR BLD CALC: 31.6 % — LOW (ref 39–50)
HGB BLD-MCNC: 10.3 G/DL — LOW (ref 13–17)
IMM GRANULOCYTES # BLD AUTO: 0.03 # — SIGNIFICANT CHANGE UP
IMM GRANULOCYTES NFR BLD AUTO: 0.3 % — SIGNIFICANT CHANGE UP (ref 0–1.5)
LYMPHOCYTES # BLD AUTO: 0.3 K/UL — LOW (ref 1–3.3)
LYMPHOCYTES # BLD AUTO: 2.8 % — LOW (ref 13–44)
LYMPHOCYTES NFR SPEC AUTO: 3 % — LOW (ref 13–44)
MACROCYTES BLD QL: SLIGHT — SIGNIFICANT CHANGE UP
MANUAL SMEAR VERIFICATION: SIGNIFICANT CHANGE UP
MCHC RBC-ENTMCNC: 31.7 PG — SIGNIFICANT CHANGE UP (ref 27–34)
MCHC RBC-ENTMCNC: 32.6 % — SIGNIFICANT CHANGE UP (ref 32–36)
MCV RBC AUTO: 97.2 FL — SIGNIFICANT CHANGE UP (ref 80–100)
MONOCYTES # BLD AUTO: 0.49 K/UL — SIGNIFICANT CHANGE UP (ref 0–0.9)
MONOCYTES NFR BLD AUTO: 4.6 % — SIGNIFICANT CHANGE UP (ref 2–14)
MONOCYTES NFR BLD: 1 % — LOW (ref 2–9)
NEUTROPHIL AB SER-ACNC: 92 % — HIGH (ref 43–77)
NEUTROPHILS # BLD AUTO: 9.78 K/UL — HIGH (ref 1.8–7.4)
NEUTROPHILS NFR BLD AUTO: 92.3 % — HIGH (ref 43–77)
NEUTS BAND # BLD: 4 % — SIGNIFICANT CHANGE UP (ref 0–6)
NRBC # FLD: 0 — SIGNIFICANT CHANGE UP
PCO2 BLDA: 40 MMHG — SIGNIFICANT CHANGE UP (ref 35–48)
PCO2 BLDA: 45 MMHG — SIGNIFICANT CHANGE UP (ref 35–48)
PH BLDA: 7.37 PH — SIGNIFICANT CHANGE UP (ref 7.35–7.45)
PH BLDA: 7.39 PH — SIGNIFICANT CHANGE UP (ref 7.35–7.45)
PLATELET # BLD AUTO: 216 K/UL — SIGNIFICANT CHANGE UP (ref 150–400)
PLATELET COUNT - ESTIMATE: NORMAL — SIGNIFICANT CHANGE UP
PMV BLD: 9.1 FL — SIGNIFICANT CHANGE UP (ref 7–13)
PO2 BLDA: 125 MMHG — HIGH (ref 83–108)
PO2 BLDA: 154 MMHG — HIGH (ref 83–108)
POTASSIUM SERPL-MCNC: 4.2 MMOL/L — SIGNIFICANT CHANGE UP (ref 3.5–5.3)
POTASSIUM SERPL-SCNC: 4.2 MMOL/L — SIGNIFICANT CHANGE UP (ref 3.5–5.3)
RBC # BLD: 3.25 M/UL — LOW (ref 4.2–5.8)
RBC # FLD: 12.8 % — SIGNIFICANT CHANGE UP (ref 10.3–14.5)
RH IG SCN BLD-IMP: POSITIVE — SIGNIFICANT CHANGE UP
SAO2 % BLDA: 99.2 % — HIGH (ref 95–99)
SAO2 % BLDA: 99.5 % — HIGH (ref 95–99)
SODIUM SERPL-SCNC: 140 MMOL/L — SIGNIFICANT CHANGE UP (ref 135–145)
WBC # BLD: 10.6 K/UL — HIGH (ref 3.8–10.5)
WBC # FLD AUTO: 10.6 K/UL — HIGH (ref 3.8–10.5)

## 2017-08-15 PROCEDURE — 71010: CPT | Mod: 26

## 2017-08-15 PROCEDURE — 99291 CRITICAL CARE FIRST HOUR: CPT

## 2017-08-15 PROCEDURE — 74000: CPT | Mod: 26

## 2017-08-15 RX ORDER — ALBUMIN HUMAN 25 %
250 VIAL (ML) INTRAVENOUS ONCE
Qty: 0 | Refills: 0 | Status: DISCONTINUED | OUTPATIENT
Start: 2017-08-15 | End: 2017-08-17

## 2017-08-15 RX ORDER — SODIUM CHLORIDE 9 MG/ML
250 INJECTION, SOLUTION INTRAVENOUS ONCE
Qty: 0 | Refills: 0 | Status: COMPLETED | OUTPATIENT
Start: 2017-08-15 | End: 2017-08-15

## 2017-08-15 RX ORDER — SODIUM CHLORIDE 9 MG/ML
500 INJECTION, SOLUTION INTRAVENOUS
Qty: 0 | Refills: 0 | Status: DISCONTINUED | OUTPATIENT
Start: 2017-08-15 | End: 2017-08-17

## 2017-08-15 RX ORDER — SODIUM CHLORIDE 9 MG/ML
500 INJECTION, SOLUTION INTRAVENOUS ONCE
Qty: 0 | Refills: 0 | Status: COMPLETED | OUTPATIENT
Start: 2017-08-15 | End: 2017-08-15

## 2017-08-15 RX ORDER — ALBUMIN HUMAN 25 %
250 VIAL (ML) INTRAVENOUS ONCE
Qty: 0 | Refills: 0 | Status: COMPLETED | OUTPATIENT
Start: 2017-08-15 | End: 2017-08-15

## 2017-08-15 RX ADMIN — CHLORHEXIDINE GLUCONATE 1 APPLICATION(S): 213 SOLUTION TOPICAL at 11:31

## 2017-08-15 RX ADMIN — SODIUM CHLORIDE 1500 MILLILITER(S): 9 INJECTION, SOLUTION INTRAVENOUS at 14:41

## 2017-08-15 RX ADMIN — PHENYLEPHRINE HYDROCHLORIDE 21 MICROGRAM(S)/KG/MIN: 10 INJECTION INTRAVENOUS at 13:23

## 2017-08-15 RX ADMIN — SODIUM CHLORIDE 250 MILLILITER(S): 9 INJECTION, SOLUTION INTRAVENOUS at 06:14

## 2017-08-15 RX ADMIN — SODIUM CHLORIDE 3000 MILLILITER(S): 9 INJECTION, SOLUTION INTRAVENOUS at 19:06

## 2017-08-15 RX ADMIN — HEPARIN SODIUM 5000 UNIT(S): 5000 INJECTION INTRAVENOUS; SUBCUTANEOUS at 13:22

## 2017-08-15 RX ADMIN — Medication 108 MILLIGRAM(S): at 05:37

## 2017-08-15 RX ADMIN — Medication 1 PUFF(S): at 16:13

## 2017-08-15 RX ADMIN — Medication 108 MILLIGRAM(S): at 19:00

## 2017-08-15 RX ADMIN — Medication 1 PUFF(S): at 04:04

## 2017-08-15 RX ADMIN — Medication 1 PUFF(S): at 22:00

## 2017-08-15 RX ADMIN — ALBUTEROL 2 PUFF(S): 90 AEROSOL, METERED ORAL at 04:04

## 2017-08-15 RX ADMIN — Medication 1 PUFF(S): at 10:51

## 2017-08-15 RX ADMIN — CHLORHEXIDINE GLUCONATE 1 APPLICATION(S): 213 SOLUTION TOPICAL at 00:05

## 2017-08-15 RX ADMIN — HYDROMORPHONE HYDROCHLORIDE 30 MILLILITER(S): 2 INJECTION INTRAMUSCULAR; INTRAVENOUS; SUBCUTANEOUS at 19:11

## 2017-08-15 RX ADMIN — Medication 125 MILLILITER(S): at 14:54

## 2017-08-15 RX ADMIN — SODIUM CHLORIDE 1500 MILLILITER(S): 9 INJECTION, SOLUTION INTRAVENOUS at 23:10

## 2017-08-15 RX ADMIN — ALBUTEROL 2 PUFF(S): 90 AEROSOL, METERED ORAL at 16:13

## 2017-08-15 RX ADMIN — HEPARIN SODIUM 5000 UNIT(S): 5000 INJECTION INTRAVENOUS; SUBCUTANEOUS at 05:36

## 2017-08-15 RX ADMIN — HEPARIN SODIUM 5000 UNIT(S): 5000 INJECTION INTRAVENOUS; SUBCUTANEOUS at 23:00

## 2017-08-15 RX ADMIN — Medication 1 MILLIGRAM(S): at 05:36

## 2017-08-15 RX ADMIN — Medication 125 MILLILITER(S): at 13:21

## 2017-08-15 RX ADMIN — Medication 300 MILLIGRAM(S): at 13:22

## 2017-08-15 RX ADMIN — ALBUTEROL 2 PUFF(S): 90 AEROSOL, METERED ORAL at 22:00

## 2017-08-15 RX ADMIN — CHLORHEXIDINE GLUCONATE 1 APPLICATION(S): 213 SOLUTION TOPICAL at 23:00

## 2017-08-15 RX ADMIN — SODIUM CHLORIDE 125 MILLILITER(S): 9 INJECTION, SOLUTION INTRAVENOUS at 13:23

## 2017-08-15 RX ADMIN — ALBUTEROL 2 PUFF(S): 90 AEROSOL, METERED ORAL at 10:51

## 2017-08-15 RX ADMIN — PANTOPRAZOLE SODIUM 40 MILLIGRAM(S): 20 TABLET, DELAYED RELEASE ORAL at 13:22

## 2017-08-15 NOTE — PROGRESS NOTE ADULT - SUBJECTIVE AND OBJECTIVE BOX
D TEAM SURGERY DAILY PROGRESS NOTE:       Subjective: Patient examined at bedside. No issues overnight. Pain well controlled. Voiding with catheter in place. Denied any nausea, vomiting, fever, chills or uncontrolled pain. Extubated earlier this afternoon, saturating well on room air.       Objective:  Gen: NAD, AAOx3  Pul: Breathing well on RA  Cardiac: RRR on monitor  Abd: soft, appropriately tender, nondistended. NGT in place suctioning bilious fluid. GINA in place suctioning serosanguinous fluid.        MEDICATIONS  (STANDING):  lactated ringers. 1000 milliLiter(s) (125 mL/Hr) IV Continuous <Continuous>  HYDROmorphone PCA (1 mG/mL) 30 milliLiter(s) PCA Continuous PCA Continuous  heparin  Injectable 5000 Unit(s) SubCutaneous every 8 hours  aspirin Suppository 300 milliGRAM(s) Rectal daily  phenytoin  IVPB 200 milliGRAM(s) IV Intermittent two times a day  phenylephrine    Infusion 0.87 MICROgram(s)/kG/Min (21 mL/Hr) IV Continuous <Continuous>  dexamethasone  Injectable 1 milliGRAM(s) IV Push daily  pantoprazole  Injectable 40 milliGRAM(s) IV Push daily  chlorhexidine 4% Liquid 1 Application(s) Topical daily  ALBUTerol    90 MICROgram(s) HFA Inhaler 2 Puff(s) Inhalation every 6 hours  ipratropium 17 MICROgram(s) HFA Inhaler 1 Puff(s) Inhalation every 6 hours  lactated ringers. 500 milliLiter(s) (250 mL/Hr) IV Continuous <Continuous>    MEDICATIONS  (PRN):  HYDROmorphone PCA (1 mG/mL) Rescue Clinician Bolus 0.5 milliGRAM(s) IV Push every 15 minutes PRN for Pain Scale GREATER THAN 6  naloxone Injectable 0.1 milliGRAM(s) IV Push every 3 minutes PRN For ANY of the following changes in patient status:  A. RR LESS THAN 10 breaths per minute, B. Oxygen saturation LESS THAN 90%, C. Sedation score of 6  ondansetron Injectable 4 milliGRAM(s) IV Push every 6 hours PRN Nausea      Vital Signs Last 24 Hrs  T(C): 36.7 (15 Aug 2017 16:00), Max: 36.7 (15 Aug 2017 16:00)  T(F): 98 (15 Aug 2017 16:00), Max: 98 (15 Aug 2017 16:00)  HR: 94 (15 Aug 2017 16:13) (65 - 94)  BP: 133/53 (15 Aug 2017 16:00) (92/63 - 144/60)  BP(mean): 71 (15 Aug 2017 16:00) (55 - 81)  RR: 23 (15 Aug 2017 16:00) (11 - 25)  SpO2: 95% (15 Aug 2017 16:13) (93% - 100%)    I&O's Detail    14 Aug 2017 07:01  -  15 Aug 2017 07:00  --------------------------------------------------------  IN:    fentaNYL  Infusion: 57.6 mL    IV PiggyBack: 50 mL    lactated ringers.: 250 mL    lactated ringers.: 1375 mL    phenylephrine   Infusion: 171.4 mL    propofol Infusion: 127.9 mL  Total IN: 2031.9 mL    OUT:    Bulb: 60 mL    Chest Tube: 60 mL    Indwelling Catheter - Urethral: 255 mL  Total OUT: 375 mL    Total NET: 1656.9 mL      15 Aug 2017 07:01  -  15 Aug 2017 16:40  --------------------------------------------------------  IN:    IV PiggyBack: 500 mL    Lactated Ringers IV Bolus: 250 mL    lactated ringers.: 1000 mL    phenylephrine   Infusion: 83.1 mL  Total IN: 1833.1 mL    OUT:    Bulb: 70 mL    Chest Tube: 130 mL    Indwelling Catheter - Urethral: 190 mL    Nasoenteral Tube: 200 mL  Total OUT: 590 mL    Total NET: 1243.1 mL          Daily     Daily     LABS:                        10.3   10.60 )-----------( 216      ( 15 Aug 2017 03:00 )             31.6     08-15    140  |  103  |  13  ----------------------------<  128<H>  4.2   |  24  |  1.04    Ca    8.8      15 Aug 2017 03:00            RADIOLOGY & ADDITIONAL STUDIES:

## 2017-08-15 NOTE — PROGRESS NOTE ADULT - SUBJECTIVE AND OBJECTIVE BOX
POST ANESTHESIA EVALUATION    82y Male POSTOP DAY 1 S/P     MENTAL STATUS: Patient participation [ X ] Awake     [  ] Arousable     [  ] Sedated    AIRWAY PATENCY: [X  ] Satisfactory  [  ] Other:     Vital Signs Last 24 Hrs  T(C): 36.1 (15 Aug 2017 04:00), Max: 36.6 (14 Aug 2017 09:47)  T(F): 97 (15 Aug 2017 04:00), Max: 97.8 (14 Aug 2017 09:47)  HR: 76 (15 Aug 2017 07:00) (65 - 76)  BP: 92/63 (15 Aug 2017 07:00) (92/63 - 144/69)  BP(mean): 68 (15 Aug 2017 07:00) (55 - 81)  RR: 19 (15 Aug 2017 07:00) (11 - 21)  SpO2: 100% (15 Aug 2017 07:00) (97% - 100%)  I&O's Summary    14 Aug 2017 07:01  -  15 Aug 2017 07:00  --------------------------------------------------------  IN: 2031.9 mL / OUT: 375 mL / NET: 1656.9 mL          NAUSEA/ VOMITTING:  [ X ] NONE  [  ] CONTROLLED [  ] OTHER     PAIN: [ X ] CONTROLLED WITH CURRENT REGIMEN  [  ] OTHER    [ X ] NO APPARENT ANESTHESIA COMPLICATIONS      Comments:

## 2017-08-15 NOTE — PROGRESS NOTE ADULT - SUBJECTIVE AND OBJECTIVE BOX
MEJIA LATIF            MRN-[id number]         penicillin (Rash)             HPI:  82 yr old male with hx of cancer of esophagus s/p chemo and radiation therapy presents for preop evaluation.  Pt is now scheduled for Esophagogastroduodenoscopy, Thoracoscopic Mobilization of Stomach, Robotic Assisted Laparoscopic Esophagogastrectomy Feeding Jejunostomy, Tube, Possible Open on 07/14/17.  Of note upon physical exam for medical/cardiac clearance pt was sent for coronary ct valenzuela and was noted have blockage, pt then had angiogram and bare metal stents x 3 placed June 30, 2017. (11 Aug 2017 09:35)      Procedure: Esophagogastrectomy / J-tube  POD# 1    Issues:  Hypotension  Respiratory failure  Esophageal cancer  Asthma  Postop pain  CAD s/p stents  Drips:  Ryan 0.8mcg/kg/hr  Propofol  Fentanyl    Home Medications:   * Patient Currently Takes Medications as of 11-Aug-2017 10:25 documented in Prescription Writer  · 	aspirin 81 mg oral tablet: Last Dose Taken:  , 1 tab(s) orally once a day  last  8/11 dose   · 	ProAir HFA 90 mcg/inh inhalation aerosol: 2 puff(s) inhaled 4 times a day, As Needed   · 	Vitamin B-12 500 mcg oral tablet: 1 tab(s) orally once a day  · 	amLODIPine 5 mg oral tablet: Last Dose Taken:  , 1 tab(s) orally once a day in pm  · 	simvastatin 40 mg oral tablet: Last Dose Taken:  , 1 tab(s) orally once a day in pm  · 	tamsulosin 0.4 mg oral capsule: Last Dose Taken:  , 1 cap(s) orally once a day in pm  · 	Zetia 10 mg oral tablet: Last Dose Taken:  , 1 tab(s) orally once a day in pm  · 	ALPRAZolam 0.25 mg oral tablet: Last Dose Taken:  , 2 tab(s) orally 2 times a day  · 	olmesartan 20 mg oral tablet: Last Dose Taken:  , 1 tab(s) orally once a day in pm  · 	montelukast 10 mg oral tablet: Last Dose Taken:  , 1 tab(s) orally once a day in pm  · 	Dilantin 100 mg oral capsule: Last Dose Taken:  , 2 cap(s) orally 2 times a day  · 	predniSONE 5 mg oral tablet: Last Dose Taken:  , 1 tab(s) orally once a day in am  · 	finasteride 5 mg oral tablet: Last Dose Taken:  , 1 tab(s) orally once a day in pm  · 	Centrum Silver oral tablet: Last Dose Taken:  , 1 tab(s) orally once a day  last dose 8/11  · 	Plavix 75 mg oral tablet: Last Dose Taken:  , 1 tab(s) orally once a day  last dose 8/10  · 	folic acid 0.4 mg oral tablet: 1 tab(s) orally once a day  · 	Vitamin B6 100 mg oral tablet: 1 tab(s) orally once a day        PAST MEDICAL & SURGICAL HISTORY:  Coronary artery disease: s/p 3 stents on June 30, 2017 at [place] Dr. Lavelle Reid  OA (osteoarthritis) of knee: right  Former smoker, stopped smoking in distant past  Former smoker  Rheumatoid arthritis  Seizure disorder: 1 episode approximately 15 yrs ago  Asthma  Hyperlipidemia  BPH (benign prostatic hyperplasia)  HTN (hypertension)  Esophagus cancer  Chronic allergic rhinitis  BCC (basal cell carcinoma): skin  Cerebrovascular accident (CVA)  Anxiety  History of tonsillectomy  H/O heart artery stent: June 30, 2017  Knee arthropathy: left  History of total hip replacement: left  History of hernia repair        ICU Vital Signs Last 24 Hrs  T(C): 36.1 (15 Aug 2017 04:00), Max: 36.6 (14 Aug 2017 09:47)  T(F): 97 (15 Aug 2017 04:00), Max: 97.8 (14 Aug 2017 09:47)  HR: 67 (15 Aug 2017 05:00) (65 - 74)  BP: 104/40 (15 Aug 2017 05:00) (103/43 - 144/69)  BP(mean): 55 (15 Aug 2017 05:00) (55 - 81)  ABP: 110/50 (15 Aug 2017 05:00) (108/60 - 157/74)  ABP(mean): 72 (15 Aug 2017 05:00) (72 - 107)  RR: 12 (15 Aug 2017 05:00) (11 - 21)  SpO2: 100% (15 Aug 2017 05:00) (97% - 100%)    I&O's Summary    14 Aug 2017 07:01  -  15 Aug 2017 05:35  --------------------------------------------------------  IN: 1131 mL / OUT: 295 mL / NET: 836 mL      CAPILLARY BLOOD GLUCOSE      MEDICATIONS  (STANDING):  lactated ringers. 1000 milliLiter(s) (125 mL/Hr) IV Continuous <Continuous>  HYDROmorphone PCA (1 mG/mL) 30 milliLiter(s) PCA Continuous PCA Continuous  heparin  Injectable 5000 Unit(s) SubCutaneous every 8 hours  aspirin Suppository 300 milliGRAM(s) Rectal daily  phenytoin  IVPB 200 milliGRAM(s) IV Intermittent two times a day  phenylephrine    Infusion 0.87 MICROgram(s)/kG/Min (21 mL/Hr) IV Continuous <Continuous>  propofol Infusion 25.88 MICROgram(s)/kG/Min (10 mL/Hr) IV Continuous <Continuous>  dexamethasone  Injectable 1 milliGRAM(s) IV Push daily  pantoprazole  Injectable 40 milliGRAM(s) IV Push daily  fentaNYL   Infusion 1 MICROgram(s)/kG/Hr (6.44 mL/Hr) IV Continuous <Continuous>  chlorhexidine 4% Liquid 1 Application(s) Topical daily  ALBUTerol    90 MICROgram(s) HFA Inhaler 2 Puff(s) Inhalation every 6 hours  ipratropium 17 MICROgram(s) HFA Inhaler 1 Puff(s) Inhalation every 6 hours    MEDICATIONS  (PRN):  HYDROmorphone PCA (1 mG/mL) Rescue Clinician Bolus 0.5 milliGRAM(s) IV Push every 15 minutes PRN for Pain Scale GREATER THAN 6  naloxone Injectable 0.1 milliGRAM(s) IV Push every 3 minutes PRN For ANY of the following changes in patient status:  A. RR LESS THAN 10 breaths per minute, B. Oxygen saturation LESS THAN 90%, C. Sedation score of 6  ondansetron Injectable 4 milliGRAM(s) IV Push every 6 hours PRN Nausea      Physical exam:                             General:               Pt is off sedation, on vent support                                                 Neuro:                  Off propofol                             Cardiovascular:     S1 & S2, regular                           Respiratory:         Air entry is fair and equal on both sides, has bilateral conducted sounds, bilateral subcutaneous emphysema                           GI:                        Soft, nondistended, no BS                            Ext:                      No cyanosis or edema    Labs:                                                                           10.3   10.60 )-----------( 216      ( 15 Aug 2017 03:00 )             31.6             08-15    140  |  103  |  13  ----------------------------<  128<H>  4.2   |  24  |  1.04    Ca    8.8      15 Aug 2017 03:00                     Plan:  82yMale Hx of Esophageal cancer s/p Esophagogastrectomy & J-tube placement POD# 1, intubated, currently off propofol.                             Neuro:                                         Pain control with Fentanyl drip    Seizure disorder: On Dilantin IV 200bid                            Cardiovascular:                                          Continue hemodynamic monitoring.    Hypotension: Titrate Ryan to MAP>65    Continue IVF 125CC/hr    CAD: Start ASA 300mg [place] daily, No plavix                            Respiratory:                                         Full mechanical vent support, AK--40%-5       Pulmonary toilet                                         Monitor chest tube output                                         Chest tube to suction                                                                  Continue bronchodilators, pulmonary toilet     CPAP wean and extubation today     Monitor subcutaneous emphysema                            GI                                         NPO                                         Continue GI prophylaxis with Protonix                                         Continue Zofran / Reglan for nausea - PRN	                                         NGT & J-tube flushes Q4hrs                            Renal:                                         Continue LR 125CC/hr                                         Monitor I/Os and electrolytes                                         López                                                  Hem/ Onc:                                                                                  Monitor chest tube output &  signs of bleeding.                                          Follow CBC in AM                           Infectious disease:                                            No signs of infection. Monitor for fever / leukocytosis.                                          All surgical incision / chest tube  sites look clean                            Endocrine                                             Continue Accu-Checks with coverage       Stress  Decadron 1mg IV daily         Pertinent clinical, laboratory, radiographic, hemodynamic, echocardiographic, respiratory data, microbiologic data and chart were reviewed and analyzed frequently throughout the course of the day and night  Patient seen, examined and plan discussed with CT Surgeon  / CTICU team during rounds.    Pt's status discussed with family at bedside, updated status    I have spent 45 minutes of critical care time with this pt between 12am  and 8am          Jose Sharif MD

## 2017-08-15 NOTE — PROGRESS NOTE ADULT - ASSESSMENT
82 year old man s/p robotic-assisted laparoscopic Kechi Trent esophagogastrectomy, extubated this AM - doing well    Plan:  1) Incentive spirometry  2) Pain control  3) OOB/ambulate with assistance  4) J tube study - start TF at 10 cc/hr

## 2017-08-15 NOTE — PHYSICAL THERAPY INITIAL EVALUATION ADULT - PERTINENT HX OF CURRENT PROBLEM, REHAB EVAL
82 yr old male with hx of cancer of esophagus s/p chemo and radiation therapy presents for preop evaluation.  Pt is now scheduled for Esophagogastroduodenoscopy,

## 2017-08-15 NOTE — PATIENT PROFILE ADULT. - ABILITY TO HEAR (WITH HEARING AID OR HEARING APPLIANCE IF NORMALLY USED):
wears hearing aids/Mildly to Moderately Impaired: difficulty hearing in some environments or speaker may need to increase volume or speak distinctly

## 2017-08-15 NOTE — PROGRESS NOTE ADULT - SUBJECTIVE AND OBJECTIVE BOX
MEJIA LATIF:[id number],   82yMale followed for:  penicillin (Rash)    PAST MEDICAL & SURGICAL HISTORY:  Coronary artery disease: s/p 3 stents on June 30, 2017 at [place] [facility] Dr. Lavelle Reid  OA (osteoarthritis) of knee: right  Former smoker, stopped smoking in distant past  Former smoker  Rheumatoid arthritis  Seizure disorder: 1 episode approximately 15 yrs ago  Asthma  Hyperlipidemia  BPH (benign prostatic hyperplasia)  HTN (hypertension)  Esophagus cancer  Chronic allergic rhinitis  BCC (basal cell carcinoma): skin  Cerebrovascular accident (CVA)  Anxiety  History of tonsillectomy  H/O heart artery stent: June 30, 2017  Knee arthropathy: left  History of total hip replacement: left  History of hernia repair    FAMILY HISTORY:  Family history of heart attack (Sibling)  Family history of pulmonary embolism (Father): father @ 49 yr old  FH: CAD (coronary artery disease) (Mother)    MEDICATIONS  (STANDING):  lactated ringers. 1000 milliLiter(s) (125 mL/Hr) IV Continuous <Continuous>  HYDROmorphone PCA (1 mG/mL) 30 milliLiter(s) PCA Continuous PCA Continuous  heparin  Injectable 5000 Unit(s) SubCutaneous every 8 hours  aspirin Suppository 300 milliGRAM(s) Rectal daily  phenytoin  IVPB 200 milliGRAM(s) IV Intermittent two times a day  phenylephrine    Infusion 0.87 MICROgram(s)/kG/Min (21 mL/Hr) IV Continuous <Continuous>  dexamethasone  Injectable 1 milliGRAM(s) IV Push daily  pantoprazole  Injectable 40 milliGRAM(s) IV Push daily  chlorhexidine 4% Liquid 1 Application(s) Topical daily  ALBUTerol    90 MICROgram(s) HFA Inhaler 2 Puff(s) Inhalation every 6 hours  ipratropium 17 MICROgram(s) HFA Inhaler 1 Puff(s) Inhalation every 6 hours  lactated ringers. 500 milliLiter(s) (250 mL/Hr) IV Continuous <Continuous>    MEDICATIONS  (PRN):  HYDROmorphone PCA (1 mG/mL) Rescue Clinician Bolus 0.5 milliGRAM(s) IV Push every 15 minutes PRN for Pain Scale GREATER THAN 6  naloxone Injectable 0.1 milliGRAM(s) IV Push every 3 minutes PRN For ANY of the following changes in patient status:  A. RR LESS THAN 10 breaths per minute, B. Oxygen saturation LESS THAN 90%, C. Sedation score of 6  ondansetron Injectable 4 milliGRAM(s) IV Push every 6 hours PRN Nausea      Vital Signs Last 24 Hrs  T(C): 36.1 (15 Aug 2017 04:00), Max: 36.6 (14 Aug 2017 09:47)  T(F): 97 (15 Aug 2017 04:00), Max: 97.8 (14 Aug 2017 09:47)  HR: 76 (15 Aug 2017 07:00) (65 - 76)  BP: 92/63 (15 Aug 2017 07:00) (92/63 - 144/69)  BP(mean): 68 (15 Aug 2017 07:00) (55 - 81)  RR: 19 (15 Aug 2017 07:00) (11 - 21)  SpO2: 100% (15 Aug 2017 07:00) (97% - 100%)  nc/at  s1s2  cta  soft, nt, nd no guarding or rebound  no c/c/e    CBC Full  -  ( 15 Aug 2017 03:00 )  WBC Count : 10.60 K/uL  Hemoglobin : 10.3 g/dL  Hematocrit : 31.6 %  Platelet Count - Automated : 216 K/uL  Mean Cell Volume : 97.2 fL  Mean Cell Hemoglobin : 31.7 pg  Mean Cell Hemoglobin Concentration : 32.6 %  Auto Neutrophil # : 9.78 K/uL  Auto Lymphocyte # : 0.30 K/uL  Auto Monocyte # : 0.49 K/uL  Auto Eosinophil # : 0.00 K/uL  Auto Basophil # : 0.00 K/uL  Auto Neutrophil % : 92.3 %  Auto Lymphocyte % : 2.8 %  Auto Monocyte % : 4.6 %  Auto Eosinophil % : 0.0 %  Auto Basophil % : 0.0 %    08-15    140  |  103  |  13  ----------------------------<  128<H>  4.2   |  24  |  1.04    Ca    8.8      15 Aug 2017 03:00

## 2017-08-15 NOTE — PROGRESS NOTE ADULT - SUBJECTIVE AND OBJECTIVE BOX
Anesthesia Pain Management Service    SUBJECTIVE: Patient is doing well extubated earlier to now start with IV PCA.    Pain Scale Score	At rest: ___ 	With Activity: ___ 	[X ] Refer to charted pain scores    THERAPY:    [ ] IV PCA Morphine		[ ] 5 mg/mL	[ ] 1 mg/mL  [X ] IV PCA Hydromorphone	[ ] 5 mg/mL	[X ] 1 mg/mL  [ ] IV PCA Fentanyl		[ ] 50 micrograms/mL    Demand dose __0.2_ lockout __6_ (minutes) Continuous Rate _0__ Total: _(not strted yet)__  mg used (in past 24 hours)      MEDICATIONS  (STANDING):  lactated ringers. 1000 milliLiter(s) (125 mL/Hr) IV Continuous <Continuous>  HYDROmorphone PCA (1 mG/mL) 30 milliLiter(s) PCA Continuous PCA Continuous  heparin  Injectable 5000 Unit(s) SubCutaneous every 8 hours  aspirin Suppository 300 milliGRAM(s) Rectal daily  phenytoin  IVPB 200 milliGRAM(s) IV Intermittent two times a day  phenylephrine    Infusion 0.87 MICROgram(s)/kG/Min (21 mL/Hr) IV Continuous <Continuous>  dexamethasone  Injectable 1 milliGRAM(s) IV Push daily  pantoprazole  Injectable 40 milliGRAM(s) IV Push daily  chlorhexidine 4% Liquid 1 Application(s) Topical daily  ALBUTerol    90 MICROgram(s) HFA Inhaler 2 Puff(s) Inhalation every 6 hours  ipratropium 17 MICROgram(s) HFA Inhaler 1 Puff(s) Inhalation every 6 hours  lactated ringers. 500 milliLiter(s) (250 mL/Hr) IV Continuous <Continuous>    MEDICATIONS  (PRN):  HYDROmorphone PCA (1 mG/mL) Rescue Clinician Bolus 0.5 milliGRAM(s) IV Push every 15 minutes PRN for Pain Scale GREATER THAN 6  naloxone Injectable 0.1 milliGRAM(s) IV Push every 3 minutes PRN For ANY of the following changes in patient status:  A. RR LESS THAN 10 breaths per minute, B. Oxygen saturation LESS THAN 90%, C. Sedation score of 6  ondansetron Injectable 4 milliGRAM(s) IV Push every 6 hours PRN Nausea      OBJECTIVE:    Sedation Score:	[ X] Alert	[ ] Drowsy 	[ ] Arousable	[ ] Asleep	[ ] Unresponsive    Side Effects:	[X ] None	[ ] Nausea	[ ] Vomiting	[ ] Pruritus  		[ ] Other:    Vital Signs Last 24 Hrs  T(C): 36.1 (15 Aug 2017 04:00), Max: 36.6 (14 Aug 2017 09:47)  T(F): 97 (15 Aug 2017 04:00), Max: 97.8 (14 Aug 2017 09:47)  HR: 76 (15 Aug 2017 07:00) (65 - 76)  BP: 92/63 (15 Aug 2017 07:00) (92/63 - 144/69)  BP(mean): 68 (15 Aug 2017 07:00) (55 - 81)  RR: 19 (15 Aug 2017 07:00) (11 - 21)  SpO2: 100% (15 Aug 2017 07:00) (97% - 100%)    ASSESSMENT/ PLAN    Therapy to  be:	[ X] Continue   [ ] Discontinued   [ ] Change to prn Analgesics    Documentation and Verification of current medications:   [X] Done	[ ] Not done, not elligible    Comments: NGT in place.    Patient was seen 08-15-17 @ 07:59

## 2017-08-15 NOTE — PROGRESS NOTE ADULT - ASSESSMENT
82 year old man s/p robotic-assisted laparoscopic [place] [facility] esophagogastrectomy    - Start TF @ 10 ml/hr, as J-tube study showed no leak  - Encourage Incentive spirometry  - Pain control: c/w PCA  -OOB/ambulate with assistance

## 2017-08-15 NOTE — PROGRESS NOTE ADULT - SUBJECTIVE AND OBJECTIVE BOX
s/p robotic-assisted laparoscopic [place] [facility] esophagogastrectomy,    Constitutional: No fever, fatigue  Skin: No rash.  Eyes: No recent vision problems or eye pain.  ENT: No congestion, ear pain, or sore throat.  Cardiovascular: No chest pain or palpation.  Respiratory: No cough, shortness of breath, congestion, or wheezing.  Gastrointestinal: No abdominal pain, nausea, vomiting, or diarrhea.  Genitourinary: No dysuria.  Musculoskeletal: No joint swelling.  Neurologic: No headache.    MEDICATIONS  (STANDING):  lactated ringers. 1000 milliLiter(s) (125 mL/Hr) IV Continuous <Continuous>  HYDROmorphone PCA (1 mG/mL) 30 milliLiter(s) PCA Continuous PCA Continuous  heparin  Injectable 5000 Unit(s) SubCutaneous every 8 hours  aspirin Suppository 300 milliGRAM(s) Rectal daily  phenytoin  IVPB 200 milliGRAM(s) IV Intermittent two times a day  phenylephrine    Infusion 0.87 MICROgram(s)/kG/Min (21 mL/Hr) IV Continuous <Continuous>  dexamethasone  Injectable 1 milliGRAM(s) IV Push daily  pantoprazole  Injectable 40 milliGRAM(s) IV Push daily  chlorhexidine 4% Liquid 1 Application(s) Topical daily  ALBUTerol    90 MICROgram(s) HFA Inhaler 2 Puff(s) Inhalation every 6 hours  ipratropium 17 MICROgram(s) HFA Inhaler 1 Puff(s) Inhalation every 6 hours  lactated ringers. 500 milliLiter(s) (250 mL/Hr) IV Continuous <Continuous>    MEDICATIONS  (PRN):  HYDROmorphone PCA (1 mG/mL) Rescue Clinician Bolus 0.5 milliGRAM(s) IV Push every 15 minutes PRN for Pain Scale GREATER THAN 6  naloxone Injectable 0.1 milliGRAM(s) IV Push every 3 minutes PRN For ANY of the following changes in patient status:  A. RR LESS THAN 10 breaths per minute, B. Oxygen saturation LESS THAN 90%, C. Sedation score of 6  ondansetron Injectable 4 milliGRAM(s) IV Push every 6 hours PRN Nausea      Vital Signs Last 24 Hrs  T(C): 36.6 (15 Aug 2017 20:00), Max: 36.7 (15 Aug 2017 16:00)  T(F): 97.8 (15 Aug 2017 20:00), Max: 98 (15 Aug 2017 16:00)  HR: 86 (15 Aug 2017 22:02) (67 - 94)  BP: 123/52 (15 Aug 2017 22:00) (92/63 - 133/55)  BP(mean): 68 (15 Aug 2017 22:00) (55 - 74)  RR: 21 (15 Aug 2017 22:00) (11 - 25)  SpO2: 100% (15 Aug 2017 22:02) (89% - 100%)    Constitutional: No fever, fatigue  Skin: No rash.  Eyes: No recent vision problems or eye pain.  ENT: No congestion, ear pain, or sore throat.  Cardiovascular: No chest pain or palpation.  Respiratory: No cough, shortness of breath, congestion, or wheezing.  Gastrointestinal: No abdominal pain, nausea, vomiting, or diarrhea.  Genitourinary: No dysuria.  Musculoskeletal: No joint swelling.  Neurologic: No headache.    HEENT :peerla, eomi  CVS : s1, s2+  RS :dec breath sounds at bases  ABD : soft, bs+  EXT :no edema  NEURO :alert awake oriented    LABS:  08-15    140  |  103  |  13  ----------------------------<  128<H>  4.2   |  24  |  1.04    Ca    8.8      15 Aug 2017 03:00      Creatinine Trend: 1.04 <--, 1.05 <--, 1.11 <--                        10.3   10.60 )-----------( 216      ( 15 Aug 2017 03:00 )             31.6     Urine Studies:          ABG - ( 15 Aug 2017 09:30 )  pH: 7.39  /  pCO2: 40    /  pO2: 125   / HCO3: 24    / Base Excess: -0.3  /  SaO2: 99.2

## 2017-08-15 NOTE — PROGRESS NOTE ADULT - ASSESSMENT
82 year old man s/p robotic-assisted laparoscopic [place] Trent esophagogastrectomy, extubated this AM - doing well    Plan:  1) Incentive spirometry  2) Pain control  3) OOB/ambulate with assistance  4) J tube study - start TF at 10 cc/hr

## 2017-08-15 NOTE — PROGRESS NOTE ADULT - SUBJECTIVE AND OBJECTIVE BOX
D TEAM SURGERY DAILY PROGRESS NOTE:       Subjective: patient examined at bedside. No issues overnight or throughout the day. Was extubated earlier this afternoon, doing well since. Breathing comfortably on RA. Pain well controlled. Voiding well with catheter in place. Denied nausea, vomiting , fever , chills or uncontrolled pain.       Objective:  Gen: NAD, AAOx3  Resp: Breathing comfortably on RA  Cardiac: RRR on monitor  Abd: soft, appropriately tender nondistended. NGT in place suctioning bilious output.       MEDICATIONS  (STANDING):  lactated ringers. 1000 milliLiter(s) (125 mL/Hr) IV Continuous <Continuous>  HYDROmorphone PCA (1 mG/mL) 30 milliLiter(s) PCA Continuous PCA Continuous  heparin  Injectable 5000 Unit(s) SubCutaneous every 8 hours  aspirin Suppository 300 milliGRAM(s) Rectal daily  phenytoin  IVPB 200 milliGRAM(s) IV Intermittent two times a day  phenylephrine    Infusion 0.87 MICROgram(s)/kG/Min (21 mL/Hr) IV Continuous <Continuous>  dexamethasone  Injectable 1 milliGRAM(s) IV Push daily  pantoprazole  Injectable 40 milliGRAM(s) IV Push daily  chlorhexidine 4% Liquid 1 Application(s) Topical daily  ALBUTerol    90 MICROgram(s) HFA Inhaler 2 Puff(s) Inhalation every 6 hours  ipratropium 17 MICROgram(s) HFA Inhaler 1 Puff(s) Inhalation every 6 hours  lactated ringers. 500 milliLiter(s) (250 mL/Hr) IV Continuous <Continuous>    MEDICATIONS  (PRN):  HYDROmorphone PCA (1 mG/mL) Rescue Clinician Bolus 0.5 milliGRAM(s) IV Push every 15 minutes PRN for Pain Scale GREATER THAN 6  naloxone Injectable 0.1 milliGRAM(s) IV Push every 3 minutes PRN For ANY of the following changes in patient status:  A. RR LESS THAN 10 breaths per minute, B. Oxygen saturation LESS THAN 90%, C. Sedation score of 6  ondansetron Injectable 4 milliGRAM(s) IV Push every 6 hours PRN Nausea      Vital Signs Last 24 Hrs  T(C): 36.7 (15 Aug 2017 16:00), Max: 36.7 (15 Aug 2017 16:00)  T(F): 98 (15 Aug 2017 16:00), Max: 98 (15 Aug 2017 16:00)  HR: 94 (15 Aug 2017 16:13) (65 - 94)  BP: 133/53 (15 Aug 2017 16:00) (92/63 - 144/60)  BP(mean): 71 (15 Aug 2017 16:00) (55 - 81)  RR: 23 (15 Aug 2017 16:00) (11 - 25)  SpO2: 95% (15 Aug 2017 16:13) (93% - 100%)    I&O's Detail    14 Aug 2017 07:01  -  15 Aug 2017 07:00  --------------------------------------------------------  IN:    fentaNYL  Infusion: 57.6 mL    IV PiggyBack: 50 mL    lactated ringers.: 250 mL    lactated ringers.: 1375 mL    phenylephrine   Infusion: 171.4 mL    propofol Infusion: 127.9 mL  Total IN: 2031.9 mL    OUT:    Bulb: 60 mL    Chest Tube: 60 mL    Indwelling Catheter - Urethral: 255 mL  Total OUT: 375 mL    Total NET: 1656.9 mL      15 Aug 2017 07:01  -  15 Aug 2017 17:24  --------------------------------------------------------  IN:    IV PiggyBack: 500 mL    Lactated Ringers IV Bolus: 250 mL    lactated ringers.: 1250 mL    phenylephrine   Infusion: 108.1 mL  Total IN: 2108.1 mL    OUT:    Bulb: 70 mL    Chest Tube: 190 mL    Indwelling Catheter - Urethral: 255 mL    Nasoenteral Tube: 200 mL  Total OUT: 715 mL    Total NET: 1393.1 mL          Daily     Daily     LABS:                        10.3   10.60 )-----------( 216      ( 15 Aug 2017 03:00 )             31.6     08-15    140  |  103  |  13  ----------------------------<  128<H>  4.2   |  24  |  1.04    Ca    8.8      15 Aug 2017 03:00            RADIOLOGY & ADDITIONAL STUDIES:

## 2017-08-15 NOTE — PROGRESS NOTE ADULT - SUBJECTIVE AND OBJECTIVE BOX
Pt seen and examined at bedside.  Extubated this AM on face tent.  No acute complaints.  Pain well controlled.  No fevers or chills.    AVSS  NAD, comfortable  soft NT ND; no rebound or guarding; incisions c/d/i

## 2017-08-16 LAB
BUN SERPL-MCNC: 16 MG/DL — SIGNIFICANT CHANGE UP (ref 7–23)
CALCIUM SERPL-MCNC: 9.1 MG/DL — SIGNIFICANT CHANGE UP (ref 8.4–10.5)
CHLORIDE SERPL-SCNC: 108 MMOL/L — HIGH (ref 98–107)
CO2 SERPL-SCNC: 27 MMOL/L — SIGNIFICANT CHANGE UP (ref 22–31)
CREAT SERPL-MCNC: 1.07 MG/DL — SIGNIFICANT CHANGE UP (ref 0.5–1.3)
GLUCOSE SERPL-MCNC: 91 MG/DL — SIGNIFICANT CHANGE UP (ref 70–99)
HCT VFR BLD CALC: 26.1 % — LOW (ref 39–50)
HGB BLD-MCNC: 8.3 G/DL — LOW (ref 13–17)
MAGNESIUM SERPL-MCNC: 2.4 MG/DL — SIGNIFICANT CHANGE UP (ref 1.6–2.6)
MCHC RBC-ENTMCNC: 31.7 PG — SIGNIFICANT CHANGE UP (ref 27–34)
MCHC RBC-ENTMCNC: 31.8 % — LOW (ref 32–36)
MCV RBC AUTO: 99.6 FL — SIGNIFICANT CHANGE UP (ref 80–100)
NRBC # FLD: 0 — SIGNIFICANT CHANGE UP
PHOSPHATE SERPL-MCNC: 3.4 MG/DL — SIGNIFICANT CHANGE UP (ref 2.5–4.5)
PLATELET # BLD AUTO: 151 K/UL — SIGNIFICANT CHANGE UP (ref 150–400)
PMV BLD: 9.2 FL — SIGNIFICANT CHANGE UP (ref 7–13)
POTASSIUM SERPL-MCNC: 5 MMOL/L — SIGNIFICANT CHANGE UP (ref 3.5–5.3)
POTASSIUM SERPL-SCNC: 5 MMOL/L — SIGNIFICANT CHANGE UP (ref 3.5–5.3)
RBC # BLD: 2.62 M/UL — LOW (ref 4.2–5.8)
RBC # FLD: 13.1 % — SIGNIFICANT CHANGE UP (ref 10.3–14.5)
SODIUM SERPL-SCNC: 144 MMOL/L — SIGNIFICANT CHANGE UP (ref 135–145)
WBC # BLD: 10.13 K/UL — SIGNIFICANT CHANGE UP (ref 3.8–10.5)
WBC # FLD AUTO: 10.13 K/UL — SIGNIFICANT CHANGE UP (ref 3.8–10.5)

## 2017-08-16 PROCEDURE — 71010: CPT | Mod: 26

## 2017-08-16 PROCEDURE — 99291 CRITICAL CARE FIRST HOUR: CPT

## 2017-08-16 RX ORDER — MAGNESIUM SULFATE 500 MG/ML
1 VIAL (ML) INJECTION ONCE
Qty: 0 | Refills: 0 | Status: COMPLETED | OUTPATIENT
Start: 2017-08-16 | End: 2017-08-16

## 2017-08-16 RX ORDER — SODIUM CHLORIDE 9 MG/ML
1000 INJECTION, SOLUTION INTRAVENOUS
Qty: 0 | Refills: 0 | Status: DISCONTINUED | OUTPATIENT
Start: 2017-08-16 | End: 2017-08-19

## 2017-08-16 RX ORDER — FUROSEMIDE 40 MG
10 TABLET ORAL ONCE
Qty: 0 | Refills: 0 | Status: COMPLETED | OUTPATIENT
Start: 2017-08-16 | End: 2017-08-16

## 2017-08-16 RX ADMIN — Medication 1 PUFF(S): at 09:59

## 2017-08-16 RX ADMIN — SODIUM CHLORIDE 80 MILLILITER(S): 9 INJECTION, SOLUTION INTRAVENOUS at 13:37

## 2017-08-16 RX ADMIN — ALBUTEROL 2 PUFF(S): 90 AEROSOL, METERED ORAL at 16:27

## 2017-08-16 RX ADMIN — Medication 1 PUFF(S): at 03:19

## 2017-08-16 RX ADMIN — HEPARIN SODIUM 5000 UNIT(S): 5000 INJECTION INTRAVENOUS; SUBCUTANEOUS at 13:07

## 2017-08-16 RX ADMIN — Medication 108 MILLIGRAM(S): at 18:22

## 2017-08-16 RX ADMIN — Medication 1 MILLIGRAM(S): at 05:11

## 2017-08-16 RX ADMIN — HEPARIN SODIUM 5000 UNIT(S): 5000 INJECTION INTRAVENOUS; SUBCUTANEOUS at 22:47

## 2017-08-16 RX ADMIN — HYDROMORPHONE HYDROCHLORIDE 30 MILLILITER(S): 2 INJECTION INTRAMUSCULAR; INTRAVENOUS; SUBCUTANEOUS at 19:15

## 2017-08-16 RX ADMIN — ALBUTEROL 2 PUFF(S): 90 AEROSOL, METERED ORAL at 09:59

## 2017-08-16 RX ADMIN — Medication 100 GRAM(S): at 23:49

## 2017-08-16 RX ADMIN — Medication 108 MILLIGRAM(S): at 05:05

## 2017-08-16 RX ADMIN — SODIUM CHLORIDE 150 MILLILITER(S): 9 INJECTION, SOLUTION INTRAVENOUS at 05:18

## 2017-08-16 RX ADMIN — SODIUM CHLORIDE 80 MILLILITER(S): 9 INJECTION, SOLUTION INTRAVENOUS at 08:55

## 2017-08-16 RX ADMIN — ALBUTEROL 2 PUFF(S): 90 AEROSOL, METERED ORAL at 03:19

## 2017-08-16 RX ADMIN — Medication 10 MILLIGRAM(S): at 23:37

## 2017-08-16 RX ADMIN — ALBUTEROL 2 PUFF(S): 90 AEROSOL, METERED ORAL at 22:16

## 2017-08-16 RX ADMIN — PANTOPRAZOLE SODIUM 40 MILLIGRAM(S): 20 TABLET, DELAYED RELEASE ORAL at 12:40

## 2017-08-16 RX ADMIN — HEPARIN SODIUM 5000 UNIT(S): 5000 INJECTION INTRAVENOUS; SUBCUTANEOUS at 05:11

## 2017-08-16 RX ADMIN — SODIUM CHLORIDE 80 MILLILITER(S): 9 INJECTION, SOLUTION INTRAVENOUS at 19:16

## 2017-08-16 RX ADMIN — Medication 1 PUFF(S): at 22:16

## 2017-08-16 RX ADMIN — Medication 300 MILLIGRAM(S): at 12:40

## 2017-08-16 RX ADMIN — SODIUM CHLORIDE 150 MILLILITER(S): 9 INJECTION, SOLUTION INTRAVENOUS at 07:03

## 2017-08-16 RX ADMIN — Medication 1 PUFF(S): at 16:26

## 2017-08-16 RX ADMIN — HYDROMORPHONE HYDROCHLORIDE 30 MILLILITER(S): 2 INJECTION INTRAMUSCULAR; INTRAVENOUS; SUBCUTANEOUS at 07:02

## 2017-08-16 RX ADMIN — CHLORHEXIDINE GLUCONATE 1 APPLICATION(S): 213 SOLUTION TOPICAL at 22:32

## 2017-08-16 NOTE — PROGRESS NOTE ADULT - SUBJECTIVE AND OBJECTIVE BOX
s/p robotic-assisted laparoscopic [place] [facility] esophagogastrectomy,    Constitutional: No fever, fatigue  Skin: No rash.  Eyes: No recent vision problems or eye pain.  ENT: No congestion, ear pain, or sore throat.  Cardiovascular: No chest pain or palpation.  Respiratory: No cough, shortness of breath, congestion, or wheezing.  Gastrointestinal: No abdominal pain, nausea, vomiting, or diarrhea.  Genitourinary: No dysuria.  Musculoskeletal: No joint swelling.  Neurologic: No headache.    MEDICATIONS  (STANDING):  HYDROmorphone PCA (1 mG/mL) 30 milliLiter(s) PCA Continuous PCA Continuous  heparin  Injectable 5000 Unit(s) SubCutaneous every 8 hours  aspirin Suppository 300 milliGRAM(s) Rectal daily  phenytoin  IVPB 200 milliGRAM(s) IV Intermittent two times a day  dexamethasone  Injectable 1 milliGRAM(s) IV Push daily  pantoprazole  Injectable 40 milliGRAM(s) IV Push daily  chlorhexidine 4% Liquid 1 Application(s) Topical daily  ALBUTerol    90 MICROgram(s) HFA Inhaler 2 Puff(s) Inhalation every 6 hours  ipratropium 17 MICROgram(s) HFA Inhaler 1 Puff(s) Inhalation every 6 hours  lactated ringers. 500 milliLiter(s) (250 mL/Hr) IV Continuous <Continuous>  dextrose 5% + sodium chloride 0.45%. 1000 milliLiter(s) (80 mL/Hr) IV Continuous <Continuous>    MEDICATIONS  (PRN):  HYDROmorphone PCA (1 mG/mL) Rescue Clinician Bolus 0.5 milliGRAM(s) IV Push every 15 minutes PRN for Pain Scale GREATER THAN 6  naloxone Injectable 0.1 milliGRAM(s) IV Push every 3 minutes PRN For ANY of the following changes in patient status:  A. RR LESS THAN 10 breaths per minute, B. Oxygen saturation LESS THAN 90%, C. Sedation score of 6  ondansetron Injectable 4 milliGRAM(s) IV Push every 6 hours PRN Nausea  albumin human  5% IVPB 250 milliLiter(s) IV Intermittent once PRN for urine output < 20 ml/hr      Vital Signs Last 24 Hrs  T(C): 36.7 (16 Aug 2017 20:00), Max: 37 (16 Aug 2017 16:00)  T(F): 98.1 (16 Aug 2017 20:00), Max: 98.6 (16 Aug 2017 16:00)  HR: 84 (16 Aug 2017 21:00) (84 - 101)  BP: 140/67 (16 Aug 2017 21:00) (114/52 - 144/63)  BP(mean): 84 (16 Aug 2017 21:00) (65 - 87)  RR: 20 (16 Aug 2017 21:00) (17 - 26)  SpO2: 100% (16 Aug 2017 21:00) (96% - 100%)    Constitutional: No fever, fatigue  Skin: No rash.  Eyes: No recent vision problems or eye pain.  ENT: No congestion, ear pain, or sore throat.  Cardiovascular: No chest pain or palpation.  Respiratory: No cough, shortness of breath, congestion, or wheezing.  Gastrointestinal: No abdominal pain, nausea, vomiting, or diarrhea.  Genitourinary: No dysuria.  Musculoskeletal: No joint swelling.  Neurologic: No headache.    HEENT :peerla, eomi  CVS : s1, s2+  RS :dec breath sounds at bases  ABD : soft, bs+  EXT :no edema  NEURO :alert awake oriented    LABS:  08-16    144  |  108<H>  |  16  ----------------------------<  91  5.0   |  27  |  1.07    Ca    9.1      16 Aug 2017 04:20  Phos  3.4     08-16  Mg     2.4     08-16      Creatinine Trend: 1.07 <--, 1.04 <--, 1.05 <--, 1.11 <--                        8.3    10.13 )-----------( 151      ( 16 Aug 2017 04:20 )             26.1     Urine Studies:          ABG - ( 15 Aug 2017 09:30 )  pH: 7.39  /  pCO2: 40    /  pO2: 125   / HCO3: 24    / Base Excess: -0.3  /  SaO2: 99.2

## 2017-08-16 NOTE — PROGRESS NOTE ADULT - ASSESSMENT
82 year old man s/p robotic-assisted laparoscopic [place] [facility] esophagogastrectomy    Plan:  1) Incentive spirometry  2) Pain control  3) OOB/ambulate with assistance  4) start tube feeds

## 2017-08-16 NOTE — PROGRESS NOTE ADULT - SUBJECTIVE AND OBJECTIVE BOX
82 yr old male with hx of cancer of esophagus s/p chemo and radiation therapy presents for preop evaluation.  Pt is now scheduled for Esophagogastroduodenoscopy, Thoracoscopic Mobilization of Stomach, Robotic Assisted Laparoscopic Esophagogastrectomy Feeding Jejunostomy, Tube, Possible Open on 07/14/17.  Of note upon physical exam for medical/cardiac clearance pt was sent for coronary ct valenzuela and was noted have blockage, pt then had angiogram and bare metal stents x 3 placed June 30, 2017.   POD# 1 ([id number]): Esophagogastrectomy / J-tube    Issues:  Hypotension  Respiratory failure  Esophageal cancer  Asthma  Postop pain  CAD s/p stent    Home Medications:   * Patient Currently Takes Medications as of 11-Aug-2017 10:25 documented in Prescription Writer  · 	aspirin 81 mg oral tablet: Last Dose Taken:  , 1 tab(s) orally once a day  last  8/11 dose   · 	ProAir HFA 90 mcg/inh inhalation aerosol: 2 puff(s) inhaled 4 times a day, As Needed   · 	Vitamin B-12 500 mcg oral tablet: 1 tab(s) orally once a day  · 	amLODIPine 5 mg oral tablet: Last Dose Taken:  , 1 tab(s) orally once a day in pm  · 	simvastatin 40 mg oral tablet: Last Dose Taken:  , 1 tab(s) orally once a day in pm  · 	tamsulosin 0.4 mg oral capsule: Last Dose Taken:  , 1 cap(s) orally once a day in pm  · 	Zetia 10 mg oral tablet: Last Dose Taken:  , 1 tab(s) orally once a day in pm  · 	ALPRAZolam 0.25 mg oral tablet: Last Dose Taken:  , 2 tab(s) orally 2 times a day  · 	olmesartan 20 mg oral tablet: Last Dose Taken:  , 1 tab(s) orally once a day in pm  · 	montelukast 10 mg oral tablet: Last Dose Taken:  , 1 tab(s) orally once a day in pm  · 	Dilantin 100 mg oral capsule: Last Dose Taken:  , 2 cap(s) orally 2 times a day  · 	predniSONE 5 mg oral tablet: Last Dose Taken:  , 1 tab(s) orally once a day in am  · 	finasteride 5 mg oral tablet: Last Dose Taken:  , 1 tab(s) orally once a day in pm  · 	Centrum Silver oral tablet: Last Dose Taken:  , 1 tab(s) orally once a day  last dose 8/11  · 	Plavix 75 mg oral tablet: Last Dose Taken:  , 1 tab(s) orally once a day  last dose 8/10  · 	folic acid 0.4 mg oral tablet: 1 tab(s) orally once a day  · 	Vitamin B6 100 mg oral tablet: 1 tab(s) orally once a day        MEDICATIONS  (STANDING):  lactated ringers. 1000 milliLiter(s) (150 mL/Hr) IV Continuous <Continuous>  HYDROmorphone PCA (1 mG/mL) 30 milliLiter(s) PCA Continuous PCA Continuous  heparin  Injectable 5000 Unit(s) SubCutaneous every 8 hours  aspirin Suppository 300 milliGRAM(s) Rectal daily  phenytoin  IVPB 200 milliGRAM(s) IV Intermittent two times a day  phenylephrine    Infusion 0.87 MICROgram(s)/kG/Min (21 mL/Hr) IV Continuous <Continuous>  dexamethasone  Injectable 1 milliGRAM(s) IV Push daily  pantoprazole  Injectable 40 milliGRAM(s) IV Push daily  chlorhexidine 4% Liquid 1 Application(s) Topical daily  ALBUTerol    90 MICROgram(s) HFA Inhaler 2 Puff(s) Inhalation every 6 hours  ipratropium 17 MICROgram(s) HFA Inhaler 1 Puff(s) Inhalation every 6 hours  lactated ringers. 500 milliLiter(s) (250 mL/Hr) IV Continuous <Continuous>    MEDICATIONS  (PRN):  HYDROmorphone PCA (1 mG/mL) Rescue Clinician Bolus 0.5 milliGRAM(s) IV Push every 15 minutes PRN for Pain Scale GREATER THAN 6  naloxone Injectable 0.1 milliGRAM(s) IV Push every 3 minutes PRN For ANY of the following changes in patient status:  A. RR LESS THAN 10 breaths per minute, B. Oxygen saturation LESS THAN 90%, C. Sedation score of 6  ondansetron Injectable 4 milliGRAM(s) IV Push every 6 hours PRN Nausea  albumin human  5% IVPB 250 milliLiter(s) IV Intermittent once PRN for urine output < 20 ml/hr      ICU Vital Signs Last 24 Hrs  T(C): 36.9 (16 Aug 2017 04:00), Max: 36.9 (16 Aug 2017 04:00)  T(F): 98.4 (16 Aug 2017 04:00), Max: 98.4 (16 Aug 2017 04:00)  HR: 88 (16 Aug 2017 05:00) (70 - 98)  BP: 126/54 (16 Aug 2017 05:00) (92/63 - 133/55)  BP(mean): 72 (16 Aug 2017 05:00) (56 - 81)  ABP: 113/66 (15 Aug 2017 12:00) (84/44 - 123/50)  ABP(mean): 81 (15 Aug 2017 12:00) (58 - 81)  RR: 21 (16 Aug 2017 05:00) (13 - 25)  SpO2: 100% (16 Aug 2017 05:00) (89% - 100%)      Physical exam:                                                   General:               Pt is off sedation, on vent support                                                 Neuro:                  Off propofol                             Cardiovascular:     S1 & S2, regular                           Respiratory:         Air entry is fair and equal on both sides, has bilateral conducted sounds, bilateral subcutaneous emphysema                           GI:                        Soft, nondistended, no BS                            Ext:                      No cyanosis or edema    I&O's Summary    14 Aug 2017 07:01  -  15 Aug 2017 07:00  --------------------------------------------------------  IN: 2031.9 mL / OUT: 375 mL / NET: 1656.9 mL    15 Aug 2017 07:01  -  16 Aug 2017 05:47  --------------------------------------------------------  IN: 4742.5 mL / OUT: 1445 mL / NET: 3297.5 mL    Labs:                                                                        8.3    10.13 )-----------( 151      ( 16 Aug 2017 04:20 )             26.1                            08-16    144  |  108<H>  |  16  ----------------------------<  91  5.0   |  27  |  1.07    Ca    9.1      16 Aug 2017 04:20  Phos  3.4     08-16  Mg     2.4     08-16        ABG - ( 15 Aug 2017 09:30 )  pH: 7.39  /  pCO2: 40    /  pO2: 125   / HCO3: 24    / Base Excess: -0.3  /  SaO2: 99.2        Plan:  82 yr old male with hx of cancer of esophagus s/p chemo and radiation therapy presents for preop evaluation.  Pt is now scheduled for Esophagogastroduodenoscopy, Thoracoscopic Mobilization of Stomach, Robotic Assisted Laparoscopic Esophagogastrectomy Feeding Jejunostomy, Tube, Possible Open on 07/14/17.  Of note upon physical exam for medical/cardiac clearance pt was sent for coronary ct valenzuela and was noted have blockage, pt then had angiogram and bare metal stents x 3 placed June 30, 2017.   POD# 1 ([id number]): Esophagogastrectomy / J-tube                             Neuro:                                         Pain control with IV tylenol    Seizure disorder: On Dilantin IV 200bid                            Cardiovascular:                                          Continue hemodynamic monitoring.    Titrated Ryan to off w fluid boluses    Continue IVF 150CC/hr    CAD: Start ASA 300mg MT daily, No plavix yet                            Respiratory:      Pulmonary toilet                                         Monitor chest tube output                                         Chest tube to suction                                                                  Continue bronchodilators, pulmonary toilet     Monitor subcutaneous emphysema                            GI                                         Continue GI prophylaxis with Protonix                                         Continue Zofran / Reglan for nausea - PRN	                                         NGT & J-tube flushes Q4hrs                            Renal:                                         Continue LR 150CC/hr                                         Monitor I/Os and electrolytes                                         López                                                  Hem/ Onc:                                                Monitor chest tube output &  signs of bleeding.                                          Follow CBC daily                           Infectious disease:                                            No signs of infection. Monitor for fever / leukocytosis.                                          All surgical incision / chest tube  sites look clean                            Endocrine                                             Continue Accu-Checks with coverage      All clinical, lab, hemodynamic and radiographic data were reviewed.   Plan discussed with CTICU team and attending CT surgeon.     I have spent 45 minutes of critical care time with this patient between 5 am and 8 am.    Aleksandr Hayden MD

## 2017-08-16 NOTE — PROGRESS NOTE ADULT - SUBJECTIVE AND OBJECTIVE BOX
Anesthesia Pain Management Service    SUBJECTIVE: Patient is doing well with IV PCA and no significant problems reported.    Pain Scale Score	At rest: ___ 	With Activity: ___ 	[X ] Refer to charted pain scores    THERAPY:    [ ] IV PCA Morphine		[ ] 5 mg/mL	[ ] 1 mg/mL  [X ] IV PCA Hydromorphone	[ ] 5 mg/mL	[X ] 1 mg/mL  [ ] IV PCA Fentanyl		[ ] 50 micrograms/mL    Demand dose __0.2_ lockout __6_ (minutes) Continuous Rate _0__ Total: _1.2__  mg used (in past 24 hours)      MEDICATIONS  (STANDING):  HYDROmorphone PCA (1 mG/mL) 30 milliLiter(s) PCA Continuous PCA Continuous  heparin  Injectable 5000 Unit(s) SubCutaneous every 8 hours  aspirin Suppository 300 milliGRAM(s) Rectal daily  phenytoin  IVPB 200 milliGRAM(s) IV Intermittent two times a day  dexamethasone  Injectable 1 milliGRAM(s) IV Push daily  pantoprazole  Injectable 40 milliGRAM(s) IV Push daily  chlorhexidine 4% Liquid 1 Application(s) Topical daily  ALBUTerol    90 MICROgram(s) HFA Inhaler 2 Puff(s) Inhalation every 6 hours  ipratropium 17 MICROgram(s) HFA Inhaler 1 Puff(s) Inhalation every 6 hours  lactated ringers. 500 milliLiter(s) (250 mL/Hr) IV Continuous <Continuous>  dextrose 5% + sodium chloride 0.45%. 1000 milliLiter(s) (80 mL/Hr) IV Continuous <Continuous>    MEDICATIONS  (PRN):  HYDROmorphone PCA (1 mG/mL) Rescue Clinician Bolus 0.5 milliGRAM(s) IV Push every 15 minutes PRN for Pain Scale GREATER THAN 6  naloxone Injectable 0.1 milliGRAM(s) IV Push every 3 minutes PRN For ANY of the following changes in patient status:  A. RR LESS THAN 10 breaths per minute, B. Oxygen saturation LESS THAN 90%, C. Sedation score of 6  ondansetron Injectable 4 milliGRAM(s) IV Push every 6 hours PRN Nausea  albumin human  5% IVPB 250 milliLiter(s) IV Intermittent once PRN for urine output < 20 ml/hr      OBJECTIVE:    Sedation Score:	[ X] Alert	[ ] Drowsy 	[ ] Arousable	[ ] Asleep	[ ] Unresponsive    Side Effects:	[X ] None	[ ] Nausea	[ ] Vomiting	[ ] Pruritus  		[ ] Other:    Vital Signs Last 24 Hrs  T(C): 36.8 (16 Aug 2017 08:00), Max: 36.9 (16 Aug 2017 04:00)  T(F): 98.3 (16 Aug 2017 08:00), Max: 98.4 (16 Aug 2017 04:00)  HR: 88 (16 Aug 2017 08:00) (75 - 98)  BP: 138/69 (16 Aug 2017 08:00) (101/44 - 138/69)  BP(mean): 84 (16 Aug 2017 08:00) (56 - 84)  RR: 21 (16 Aug 2017 08:00) (18 - 25)  SpO2: 99% (16 Aug 2017 08:00) (89% - 100%)    ASSESSMENT/ PLAN    Therapy to  be:	[ X] Continue   [ ] Discontinued   [ ] Change to prn Analgesics    Documentation and Verification of current medications:   [X] Done	[ ] Not done, not elligible    Comments: NGT in place.     Patient was seen 08-16-17 @ 09:35

## 2017-08-16 NOTE — PROGRESS NOTE ADULT - SUBJECTIVE AND OBJECTIVE BOX
D Team Surgery     Subjective:   MEJIA LATIF is a 82y Male with history of CAD s/p stent, CVA, and esophageal cancer who is POD 1 from [facility] Trnet esophagectomy. The patient was successfully extubated and weaned from pressor support on 8/15. His J-tube was also patent. No flatus or BM. Pain well controlled.     Objective:  Allergies:  - penicillin (Rash)    PMH:  - Malignant neoplasm of lower third of esophagus  - HTN  - HLD  - CAD  - Rheumatoid arthritis  - Seizure disorder  - Asthma  - BPH (benign prostatic hyperplasia)  - BCC (basal cell carcinoma)  - Cerebrovascular accident (CVA)    PSH:   - Aniwa Trent esophagectomy  - History of tonsillectomy  - H/O heart artery stent  - Knee arthropathy  - History of total hip replacement  - History of hernia repair    Meds:   - HYDROmorphone PCA (1 mG/mL) 30 milliLiter(s) PCA Continuous + 0.5 milliGRAM(s) IV Push every 15 minutes PRN  - heparin  Injectable 5000 Unit(s) SubCutaneous every 8 hours  - aspirin Suppository 300 milliGRAM(s) Rectal daily  - phenytoin  IVPB 200 milliGRAM(s) IV Intermittent two times a day  - dexamethasone  Injectable 1 milliGRAM(s) IV Push daily    Vital Signs:  - T(C): 36.3 (16 Aug 2017 12:00), Max: 36.9 (16 Aug 2017 04:00)  -HR: 95 (16 Aug 2017 13:00) (81 - 101)  - BP: 133/66 (16 Aug 2017 13:00) (114/43 - 139/59)  - RR: 26 (16 Aug 2017 13:00) (18 - 26)  - SpO2: 100% (16 Aug 2017 13:00) (89% - 100%)    I&O's:  15 Aug 2017 07:01  -  16 Aug 2017 07:00  --------------------------------------------------------  IN:    Enteral Tube Flush: 60 mL    Enteral Tube Flush: 60 mL    IV PiggyBack: 550 mL    Lactated Ringers IV Bolus: 1000 mL    lactated ringers.: 3100 mL    phenylephrine   Infusion: 122.5 mL  Total IN: 4892.5 mL  OUT:    Bulb: 90 mL    Chest Tube: 320 mL    Indwelling Catheter - Urethral: 665 mL    Nasoenteral Tube: 450 mL  Total OUT: 1525 mL  Total NET: 3367.5 mL  16 Aug 2017 07:01  -  16 Aug 2017 13:28  --------------------------------------------------------  IN:    dextrose 5% + sodium chloride 0.45%.: 400 mL    Enteral Tube Flush: 40 mL    Enteral Tube Flush: 40 mL    Jevity: 30 mL  Total IN: 510 mL  OUT:    Bulb: 30 mL    Chest Tube: 50 mL    Indwelling Catheter - Urethral: 260 mL  Total OUT: 340 mL  Total NET: 170 mL    Physical Exam:   - General: alert, interactive, NAD, sitting in chair  - HEENT: NGT  - Pulm: breathing comfortably   - Abd: soft, mildly distended, serosanguinous in GINA    Labs:                         8.3    10.13 )-----------( 151      ( 16 Aug 2017 04:20 )             26.1   08-16    144  |  108<H>  |  16  ----------------------------<  91  5.0   |  27  |  1.07    Ca    9.1      16 Aug 2017 04:20  Phos  3.4     08-16  Mg     2.4     08-16    Rads:   CXR (8/15/17): Contrast is seen entering the jejunostomy tube and opacifying the small bowel, without evidence of gross extravasation on this limited frontal radiograph.    Assessment:   MEJIA LATIF is a 82 y.o. man POD 1 from Aniwa Trent esophagectomy and jejunostomy tube placement.     Plan:   - Start tube feeds   - PCA for pain control  - Monitor GI function   - Monitor drains for change in quality or quantity

## 2017-08-16 NOTE — PROGRESS NOTE ADULT - SUBJECTIVE AND OBJECTIVE BOX
MEJIA LATIF:[id number],   82yMale followed for:esophageal cancer  penicillin (Rash)    PAST MEDICAL & SURGICAL HISTORY:  Coronary artery disease: s/p 3 stents on June 30, 2017 at [facility] Dr. Lavelle Reid  OA (osteoarthritis) of knee: right  Former smoker, stopped smoking in distant past  Former smoker  Rheumatoid arthritis  Seizure disorder: 1 episode approximately 15 yrs ago  Asthma  Hyperlipidemia  BPH (benign prostatic hyperplasia)  HTN (hypertension)  Esophagus cancer  Chronic allergic rhinitis  BCC (basal cell carcinoma): skin  Cerebrovascular accident (CVA)  Anxiety  History of tonsillectomy  H/O heart artery stent: June 30, 2017  Knee arthropathy: left  History of total hip replacement: left  History of hernia repair    FAMILY HISTORY:  Family history of heart attack (Sibling)  Family history of pulmonary embolism (Father): father @ 49 yr old  FH: CAD (coronary artery disease) (Mother)    MEDICATIONS  (STANDING):  lactated ringers. 1000 milliLiter(s) (150 mL/Hr) IV Continuous <Continuous>  HYDROmorphone PCA (1 mG/mL) 30 milliLiter(s) PCA Continuous PCA Continuous  heparin  Injectable 5000 Unit(s) SubCutaneous every 8 hours  aspirin Suppository 300 milliGRAM(s) Rectal daily  phenytoin  IVPB 200 milliGRAM(s) IV Intermittent two times a day  dexamethasone  Injectable 1 milliGRAM(s) IV Push daily  pantoprazole  Injectable 40 milliGRAM(s) IV Push daily  chlorhexidine 4% Liquid 1 Application(s) Topical daily  ALBUTerol    90 MICROgram(s) HFA Inhaler 2 Puff(s) Inhalation every 6 hours  ipratropium 17 MICROgram(s) HFA Inhaler 1 Puff(s) Inhalation every 6 hours  lactated ringers. 500 milliLiter(s) (250 mL/Hr) IV Continuous <Continuous>    MEDICATIONS  (PRN):  HYDROmorphone PCA (1 mG/mL) Rescue Clinician Bolus 0.5 milliGRAM(s) IV Push every 15 minutes PRN for Pain Scale GREATER THAN 6  naloxone Injectable 0.1 milliGRAM(s) IV Push every 3 minutes PRN For ANY of the following changes in patient status:  A. RR LESS THAN 10 breaths per minute, B. Oxygen saturation LESS THAN 90%, C. Sedation score of 6  ondansetron Injectable 4 milliGRAM(s) IV Push every 6 hours PRN Nausea  albumin human  5% IVPB 250 milliLiter(s) IV Intermittent once PRN for urine output < 20 ml/hr      Vital Signs Last 24 Hrs  T(C): 36.9 (16 Aug 2017 04:00), Max: 36.9 (16 Aug 2017 04:00)  T(F): 98.4 (16 Aug 2017 04:00), Max: 98.4 (16 Aug 2017 04:00)  HR: 93 (16 Aug 2017 06:00) (70 - 98)  BP: 124/65 (16 Aug 2017 06:00) (101/44 - 133/53)  BP(mean): 79 (16 Aug 2017 06:00) (56 - 81)  RR: 23 (16 Aug 2017 06:00) (16 - 25)  SpO2: 99% (16 Aug 2017 06:00) (89% - 100%)  nc/at  s1s2  cta  soft, nt, nd no guarding or rebound  no c/c/e    CBC Full  -  ( 16 Aug 2017 04:20 )  WBC Count : 10.13 K/uL  Hemoglobin : 8.3 g/dL  Hematocrit : 26.1 %  Platelet Count - Automated : 151 K/uL  Mean Cell Volume : 99.6 fL  Mean Cell Hemoglobin : 31.7 pg  Mean Cell Hemoglobin Concentration : 31.8 %  Auto Neutrophil # : x  Auto Lymphocyte # : x  Auto Monocyte # : x  Auto Eosinophil # : x  Auto Basophil # : x  Auto Neutrophil % : x  Auto Lymphocyte % : x  Auto Monocyte % : x  Auto Eosinophil % : x  Auto Basophil % : x    08-16    144  |  108<H>  |  16  ----------------------------<  91  5.0   |  27  |  1.07    Ca    9.1      16 Aug 2017 04:20  Phos  3.4     08-16  Mg     2.4     08-16

## 2017-08-17 LAB
BASOPHILS # BLD AUTO: 0.02 K/UL — SIGNIFICANT CHANGE UP (ref 0–0.2)
BASOPHILS NFR BLD AUTO: 0.2 % — SIGNIFICANT CHANGE UP (ref 0–2)
BUN SERPL-MCNC: 12 MG/DL — SIGNIFICANT CHANGE UP (ref 7–23)
BUN SERPL-MCNC: 13 MG/DL — SIGNIFICANT CHANGE UP (ref 7–23)
BUN SERPL-MCNC: 15 MG/DL — SIGNIFICANT CHANGE UP (ref 7–23)
CALCIUM SERPL-MCNC: 8.4 MG/DL — SIGNIFICANT CHANGE UP (ref 8.4–10.5)
CALCIUM SERPL-MCNC: 9 MG/DL — SIGNIFICANT CHANGE UP (ref 8.4–10.5)
CALCIUM SERPL-MCNC: 9.4 MG/DL — SIGNIFICANT CHANGE UP (ref 8.4–10.5)
CHLORIDE SERPL-SCNC: 102 MMOL/L — SIGNIFICANT CHANGE UP (ref 98–107)
CHLORIDE SERPL-SCNC: 105 MMOL/L — SIGNIFICANT CHANGE UP (ref 98–107)
CHLORIDE SERPL-SCNC: 105 MMOL/L — SIGNIFICANT CHANGE UP (ref 98–107)
CO2 SERPL-SCNC: 24 MMOL/L — SIGNIFICANT CHANGE UP (ref 22–31)
CO2 SERPL-SCNC: 25 MMOL/L — SIGNIFICANT CHANGE UP (ref 22–31)
CO2 SERPL-SCNC: 27 MMOL/L — SIGNIFICANT CHANGE UP (ref 22–31)
CREAT SERPL-MCNC: 0.79 MG/DL — SIGNIFICANT CHANGE UP (ref 0.5–1.3)
CREAT SERPL-MCNC: 0.82 MG/DL — SIGNIFICANT CHANGE UP (ref 0.5–1.3)
CREAT SERPL-MCNC: 0.88 MG/DL — SIGNIFICANT CHANGE UP (ref 0.5–1.3)
EOSINOPHIL # BLD AUTO: 0.13 K/UL — SIGNIFICANT CHANGE UP (ref 0–0.5)
EOSINOPHIL NFR BLD AUTO: 1.2 % — SIGNIFICANT CHANGE UP (ref 0–6)
GLUCOSE SERPL-MCNC: 134 MG/DL — HIGH (ref 70–99)
GLUCOSE SERPL-MCNC: 459 MG/DL — CRITICAL HIGH (ref 70–99)
GLUCOSE SERPL-MCNC: 99 MG/DL — SIGNIFICANT CHANGE UP (ref 70–99)
HCT VFR BLD CALC: 25.6 % — LOW (ref 39–50)
HGB BLD-MCNC: 8.5 G/DL — LOW (ref 13–17)
IMM GRANULOCYTES # BLD AUTO: 0.04 # — SIGNIFICANT CHANGE UP
IMM GRANULOCYTES NFR BLD AUTO: 0.4 % — SIGNIFICANT CHANGE UP (ref 0–1.5)
LYMPHOCYTES # BLD AUTO: 0.5 K/UL — LOW (ref 1–3.3)
LYMPHOCYTES # BLD AUTO: 4.8 % — LOW (ref 13–44)
MAGNESIUM SERPL-MCNC: 2 MG/DL — SIGNIFICANT CHANGE UP (ref 1.6–2.6)
MAGNESIUM SERPL-MCNC: 2 MG/DL — SIGNIFICANT CHANGE UP (ref 1.6–2.6)
MAGNESIUM SERPL-MCNC: 2.3 MG/DL — SIGNIFICANT CHANGE UP (ref 1.6–2.6)
MCHC RBC-ENTMCNC: 32.6 PG — SIGNIFICANT CHANGE UP (ref 27–34)
MCHC RBC-ENTMCNC: 33.2 % — SIGNIFICANT CHANGE UP (ref 32–36)
MCV RBC AUTO: 98.1 FL — SIGNIFICANT CHANGE UP (ref 80–100)
MONOCYTES # BLD AUTO: 0.61 K/UL — SIGNIFICANT CHANGE UP (ref 0–0.9)
MONOCYTES NFR BLD AUTO: 5.8 % — SIGNIFICANT CHANGE UP (ref 2–14)
NEUTROPHILS # BLD AUTO: 9.16 K/UL — HIGH (ref 1.8–7.4)
NEUTROPHILS NFR BLD AUTO: 87.6 % — HIGH (ref 43–77)
NRBC # FLD: 0 — SIGNIFICANT CHANGE UP
PHOSPHATE SERPL-MCNC: 2.1 MG/DL — LOW (ref 2.5–4.5)
PHOSPHATE SERPL-MCNC: 2.3 MG/DL — LOW (ref 2.5–4.5)
PHOSPHATE SERPL-MCNC: 3.1 MG/DL — SIGNIFICANT CHANGE UP (ref 2.5–4.5)
PLATELET # BLD AUTO: 154 K/UL — SIGNIFICANT CHANGE UP (ref 150–400)
PMV BLD: 9.3 FL — SIGNIFICANT CHANGE UP (ref 7–13)
POTASSIUM SERPL-MCNC: 3.2 MMOL/L — LOW (ref 3.5–5.3)
POTASSIUM SERPL-MCNC: 3.4 MMOL/L — LOW (ref 3.5–5.3)
POTASSIUM SERPL-MCNC: 4.4 MMOL/L — SIGNIFICANT CHANGE UP (ref 3.5–5.3)
POTASSIUM SERPL-SCNC: 3.2 MMOL/L — LOW (ref 3.5–5.3)
POTASSIUM SERPL-SCNC: 3.4 MMOL/L — LOW (ref 3.5–5.3)
POTASSIUM SERPL-SCNC: 4.4 MMOL/L — SIGNIFICANT CHANGE UP (ref 3.5–5.3)
RBC # BLD: 2.61 M/UL — LOW (ref 4.2–5.8)
RBC # FLD: 13.1 % — SIGNIFICANT CHANGE UP (ref 10.3–14.5)
SODIUM SERPL-SCNC: 136 MMOL/L — SIGNIFICANT CHANGE UP (ref 135–145)
SODIUM SERPL-SCNC: 142 MMOL/L — SIGNIFICANT CHANGE UP (ref 135–145)
SODIUM SERPL-SCNC: 143 MMOL/L — SIGNIFICANT CHANGE UP (ref 135–145)
WBC # BLD: 10.46 K/UL — SIGNIFICANT CHANGE UP (ref 3.8–10.5)
WBC # FLD AUTO: 10.46 K/UL — SIGNIFICANT CHANGE UP (ref 3.8–10.5)

## 2017-08-17 PROCEDURE — 71010: CPT | Mod: 26

## 2017-08-17 PROCEDURE — 99233 SBSQ HOSP IP/OBS HIGH 50: CPT

## 2017-08-17 RX ORDER — FUROSEMIDE 40 MG
10 TABLET ORAL ONCE
Qty: 0 | Refills: 0 | Status: COMPLETED | OUTPATIENT
Start: 2017-08-17 | End: 2017-08-17

## 2017-08-17 RX ORDER — POTASSIUM PHOSPHATE, MONOBASIC POTASSIUM PHOSPHATE, DIBASIC 236; 224 MG/ML; MG/ML
15 INJECTION, SOLUTION INTRAVENOUS ONCE
Qty: 0 | Refills: 0 | Status: COMPLETED | OUTPATIENT
Start: 2017-08-17 | End: 2017-08-17

## 2017-08-17 RX ORDER — POTASSIUM CHLORIDE 20 MEQ
10 PACKET (EA) ORAL
Qty: 0 | Refills: 0 | Status: COMPLETED | OUTPATIENT
Start: 2017-08-17 | End: 2017-08-17

## 2017-08-17 RX ORDER — AMLODIPINE BESYLATE 2.5 MG/1
2.5 TABLET ORAL DAILY
Qty: 0 | Refills: 0 | Status: DISCONTINUED | OUTPATIENT
Start: 2017-08-17 | End: 2017-08-17

## 2017-08-17 RX ORDER — POTASSIUM CHLORIDE 20 MEQ
10 PACKET (EA) ORAL
Qty: 0 | Refills: 0 | Status: COMPLETED | OUTPATIENT
Start: 2017-08-17 | End: 2017-08-18

## 2017-08-17 RX ADMIN — Medication 100 MILLIEQUIVALENT(S): at 06:59

## 2017-08-17 RX ADMIN — Medication 100 MILLIEQUIVALENT(S): at 04:30

## 2017-08-17 RX ADMIN — HEPARIN SODIUM 5000 UNIT(S): 5000 INJECTION INTRAVENOUS; SUBCUTANEOUS at 22:46

## 2017-08-17 RX ADMIN — Medication 100 MILLIEQUIVALENT(S): at 07:30

## 2017-08-17 RX ADMIN — Medication 108 MILLIGRAM(S): at 05:34

## 2017-08-17 RX ADMIN — Medication 10 MILLIGRAM(S): at 23:02

## 2017-08-17 RX ADMIN — Medication 1 PUFF(S): at 09:02

## 2017-08-17 RX ADMIN — PANTOPRAZOLE SODIUM 40 MILLIGRAM(S): 20 TABLET, DELAYED RELEASE ORAL at 13:18

## 2017-08-17 RX ADMIN — POTASSIUM PHOSPHATE, MONOBASIC POTASSIUM PHOSPHATE, DIBASIC 62.5 MILLIMOLE(S): 236; 224 INJECTION, SOLUTION INTRAVENOUS at 18:06

## 2017-08-17 RX ADMIN — ALBUTEROL 2 PUFF(S): 90 AEROSOL, METERED ORAL at 09:02

## 2017-08-17 RX ADMIN — Medication 100 MILLIEQUIVALENT(S): at 21:01

## 2017-08-17 RX ADMIN — HEPARIN SODIUM 5000 UNIT(S): 5000 INJECTION INTRAVENOUS; SUBCUTANEOUS at 05:35

## 2017-08-17 RX ADMIN — ALBUTEROL 2 PUFF(S): 90 AEROSOL, METERED ORAL at 21:54

## 2017-08-17 RX ADMIN — Medication 1 PUFF(S): at 15:05

## 2017-08-17 RX ADMIN — POTASSIUM PHOSPHATE, MONOBASIC POTASSIUM PHOSPHATE, DIBASIC 62.5 MILLIMOLE(S): 236; 224 INJECTION, SOLUTION INTRAVENOUS at 05:43

## 2017-08-17 RX ADMIN — Medication 1 PUFF(S): at 21:54

## 2017-08-17 RX ADMIN — HYDROMORPHONE HYDROCHLORIDE 30 MILLILITER(S): 2 INJECTION INTRAMUSCULAR; INTRAVENOUS; SUBCUTANEOUS at 19:35

## 2017-08-17 RX ADMIN — Medication 300 MILLIGRAM(S): at 13:19

## 2017-08-17 RX ADMIN — Medication 10 MILLIGRAM(S): at 16:48

## 2017-08-17 RX ADMIN — ALBUTEROL 2 PUFF(S): 90 AEROSOL, METERED ORAL at 15:05

## 2017-08-17 RX ADMIN — Medication 1 PUFF(S): at 04:08

## 2017-08-17 RX ADMIN — Medication 100 MILLIEQUIVALENT(S): at 18:42

## 2017-08-17 RX ADMIN — Medication 1 MILLIGRAM(S): at 05:35

## 2017-08-17 RX ADMIN — HEPARIN SODIUM 5000 UNIT(S): 5000 INJECTION INTRAVENOUS; SUBCUTANEOUS at 13:19

## 2017-08-17 RX ADMIN — Medication 108 MILLIGRAM(S): at 18:00

## 2017-08-17 RX ADMIN — SODIUM CHLORIDE 80 MILLILITER(S): 9 INJECTION, SOLUTION INTRAVENOUS at 07:29

## 2017-08-17 RX ADMIN — Medication 100 MILLIEQUIVALENT(S): at 23:02

## 2017-08-17 RX ADMIN — HYDROMORPHONE HYDROCHLORIDE 30 MILLILITER(S): 2 INJECTION INTRAMUSCULAR; INTRAVENOUS; SUBCUTANEOUS at 07:29

## 2017-08-17 RX ADMIN — ALBUTEROL 2 PUFF(S): 90 AEROSOL, METERED ORAL at 04:08

## 2017-08-17 NOTE — PROGRESS NOTE ADULT - SUBJECTIVE AND OBJECTIVE BOX
82 yr old male with hx of cancer of esophagus s/p chemo and radiation therapy presents for preop evaluation.  Pt is now scheduled for Esophagogastroduodenoscopy, Thoracoscopic Mobilization of Stomach, Robotic Assisted Laparoscopic Esophagogastrectomy Feeding Jejunostomy, Tube, Possible Open on 07/14/17.  Of note upon physical exam for medical/cardiac clearance pt was sent for coronary ct valenzuela and was noted have blockage, pt then had angiogram and bare metal stents x 3 placed June 30, 2017.   POD# 3 ([id number]): Esophagogastrectomy / J-tube    Issues:  Esophageal cancer  Asthma  Postop pain  CAD s/p stent    Home Medications:   * Patient Currently Takes Medications as of 11-Aug-2017 10:25 documented in Prescription Writer  · 	aspirin 81 mg oral tablet: Last Dose Taken:  , 1 tab(s) orally once a day  last  8/11 dose   · 	ProAir HFA 90 mcg/inh inhalation aerosol: 2 puff(s) inhaled 4 times a day, As Needed   · 	Vitamin B-12 500 mcg oral tablet: 1 tab(s) orally once a day  · 	amLODIPine 5 mg oral tablet: Last Dose Taken:  , 1 tab(s) orally once a day in pm  · 	simvastatin 40 mg oral tablet: Last Dose Taken:  , 1 tab(s) orally once a day in pm  · 	tamsulosin 0.4 mg oral capsule: Last Dose Taken:  , 1 cap(s) orally once a day in pm  · 	Zetia 10 mg oral tablet: Last Dose Taken:  , 1 tab(s) orally once a day in pm  · 	ALPRAZolam 0.25 mg oral tablet: Last Dose Taken:  , 2 tab(s) orally 2 times a day  · 	olmesartan 20 mg oral tablet: Last Dose Taken:  , 1 tab(s) orally once a day in pm  · 	montelukast 10 mg oral tablet: Last Dose Taken:  , 1 tab(s) orally once a day in pm  · 	Dilantin 100 mg oral capsule: Last Dose Taken:  , 2 cap(s) orally 2 times a day  · 	predniSONE 5 mg oral tablet: Last Dose Taken:  , 1 tab(s) orally once a day in am  · 	finasteride 5 mg oral tablet: Last Dose Taken:  , 1 tab(s) orally once a day in pm  · 	Centrum Silver oral tablet: Last Dose Taken:  , 1 tab(s) orally once a day  last dose 8/11  · 	Plavix 75 mg oral tablet: Last Dose Taken:  , 1 tab(s) orally once a day  last dose 8/10  · 	folic acid 0.4 mg oral tablet: 1 tab(s) orally once a day  · 	Vitamin B6 100 mg oral tablet: 1 tab(s) orally once a day    MEDICATIONS  (STANDING):  HYDROmorphone PCA (1 mG/mL) 30 milliLiter(s) PCA Continuous PCA Continuous  heparin  Injectable 5000 Unit(s) SubCutaneous every 8 hours  aspirin Suppository 300 milliGRAM(s) Rectal daily  phenytoin  IVPB 200 milliGRAM(s) IV Intermittent two times a day  dexamethasone  Injectable 1 milliGRAM(s) IV Push daily  pantoprazole  Injectable 40 milliGRAM(s) IV Push daily  chlorhexidine 4% Liquid 1 Application(s) Topical daily  ALBUTerol    90 MICROgram(s) HFA Inhaler 2 Puff(s) Inhalation every 6 hours  ipratropium 17 MICROgram(s) HFA Inhaler 1 Puff(s) Inhalation every 6 hours  lactated ringers. 500 milliLiter(s) (250 mL/Hr) IV Continuous <Continuous>  dextrose 5% + sodium chloride 0.45%. 1000 milliLiter(s) (80 mL/Hr) IV Continuous <Continuous>    MEDICATIONS  (PRN):  HYDROmorphone PCA (1 mG/mL) Rescue Clinician Bolus 0.5 milliGRAM(s) IV Push every 15 minutes PRN for Pain Scale GREATER THAN 6  naloxone Injectable 0.1 milliGRAM(s) IV Push every 3 minutes PRN For ANY of the following changes in patient status:  A. RR LESS THAN 10 breaths per minute, B. Oxygen saturation LESS THAN 90%, C. Sedation score of 6  ondansetron Injectable 4 milliGRAM(s) IV Push every 6 hours PRN Nausea  albumin human  5% IVPB 250 milliLiter(s) IV Intermittent once PRN for urine output < 20 ml/hr    ICU Vital Signs Last 24 Hrs  T(C): 36.6 (17 Aug 2017 08:00), Max: 37 (16 Aug 2017 16:00)  T(F): 97.8 (17 Aug 2017 08:00), Max: 98.6 (16 Aug 2017 16:00)  HR: 77 (17 Aug 2017 08:00) (73 - 101)  BP: 144/57 (17 Aug 2017 08:00) (114/62 - 159/67)  BP(mean): 76 (17 Aug 2017 08:00) (63 - 92)  ABP: --  ABP(mean): --  RR: 15 (17 Aug 2017 08:00) (15 - 26)  SpO2: 98% (17 Aug 2017 08:00) (97% - 100%)    Physical exam:                                                   General:               Pt is off sedation, on vent support                                                 Neuro:                  Off propofol                             Cardiovascular:     S1 & S2, regular                           Respiratory:         Air entry is fair and equal on both sides, has bilateral conducted sounds, bilateral subcutaneous emphysema                           GI:                        Soft, nondistended, no BS                            Ext:                      No cyanosis or edema    I&O's Summary    16 Aug 2017 07:01  -  17 Aug 2017 07:00  --------------------------------------------------------  IN: 2400 mL / OUT: 2185 mL / NET: 215 mL    17 Aug 2017 07:01  -  17 Aug 2017 08:52  --------------------------------------------------------  IN: 240 mL / OUT: 100 mL / NET: 140 mL    Labs:                                                                           8.5    10.46 )-----------( 154      ( 17 Aug 2017 03:00 )             25.6                            08-17    142  |  105  |  15  ----------------------------<  134<H>  3.2<L>   |  27  |  0.88    Ca    9.0      17 Aug 2017 03:00  Phos  2.3     08-17  Mg     2.3     08-17        ABG - ( 15 Aug 2017 09:30 )  pH: 7.39  /  pCO2: 40    /  pO2: 125   / HCO3: 24    / Base Excess: -0.3  /  SaO2: 99.2        Plan:  82 yr old male with hx of cancer of esophagus s/p chemo and radiation therapy presents for preop evaluation.  Pt is now scheduled for Esophagogastroduodenoscopy, Thoracoscopic Mobilization of Stomach, Robotic Assisted Laparoscopic Esophagogastrectomy Feeding Jejunostomy, Tube, Possible Open on 07/14/17.  Of note upon physical exam for medical/cardiac clearance pt was sent for coronary ct valenzuela and was noted have blockage, pt then had angiogram and bare metal stents x 3 placed June 30, 2017.   POD# 3 ([id number]): Esophagogastrectomy / J-tube                             Neuro:                                         Pain control with IV tylenol    Seizure disorder: On Dilantin IV 200bid                            Cardiovascular:                                          Continue hemodynamic monitoring.    Ryan is off     CAD: Start ASA 300mg MI daily, No plavix yet                            Respiratory:      Pulmonary toilet                                         Monitor chest tube output                                         Chest tube to water seal                                                                 Continue bronchodilators, pulmonary toilet     Monitor subcutaneous emphysema                            GI                                         Continue GI prophylaxis with Protonix                                         Continue Zofran / Reglan for nausea - PRN	                                         NGT & J-tube flushes Q4hrs                                         Advance TF to 20 ml/hr                            Renal:                                         Monitor I/Os and electrolytes                                         López until Proscar is restarted                                                  Hem/ Onc:                                                Monitor chest tube output &  signs of bleeding.                                          Follow CBC daily                           Infectious disease:                                            No signs of infection. Monitor for fever / leukocytosis.                                          All surgical incision / chest tube  sites look clean                            Endocrine                                             Continue Accu-Checks with coverage      All clinical, lab, hemodynamic and radiographic data were reviewed.   Plan discussed with CTICU team and attending CT surgeon.     I have spent 45 minutes of critical care time with this patient between 5 am and 8 am.    Aleksandr Hayden MD

## 2017-08-17 NOTE — PROGRESS NOTE ADULT - ASSESSMENT
MEJIA LATIF is a 82 y.o. man POD 3 from [facility] esophagectomy and jejunostomy tube placement.     Plan:   - Increase TF to 20 ml/hr  - c/w PCA for pain control  - Monitor GI function   - Monitor drains for change in quality or quantity

## 2017-08-17 NOTE — PROGRESS NOTE ADULT - SUBJECTIVE AND OBJECTIVE BOX
Anesthesia Pain Management Service    SUBJECTIVE: Patient is doing well with IV PCA and no significant problems reported.    Pain Scale Score	At rest: ___ 	With Activity: ___ 	[X ] Refer to charted pain scores    THERAPY:    [ ] IV PCA Morphine		[ ] 5 mg/mL	[ ] 1 mg/mL  [X ] IV PCA Hydromorphone	[ ] 5 mg/mL	[X ] 1 mg/mL  [ ] IV PCA Fentanyl		[ ] 50 micrograms/mL    Demand dose __0.2_ lockout __6_ (minutes) Continuous Rate _0__ Total: _1.8__  mg used (in past 24 hours)      MEDICATIONS  (STANDING):  HYDROmorphone PCA (1 mG/mL) 30 milliLiter(s) PCA Continuous PCA Continuous  heparin  Injectable 5000 Unit(s) SubCutaneous every 8 hours  aspirin Suppository 300 milliGRAM(s) Rectal daily  phenytoin  IVPB 200 milliGRAM(s) IV Intermittent two times a day  dexamethasone  Injectable 1 milliGRAM(s) IV Push daily  pantoprazole  Injectable 40 milliGRAM(s) IV Push daily  chlorhexidine 4% Liquid 1 Application(s) Topical daily  ALBUTerol    90 MICROgram(s) HFA Inhaler 2 Puff(s) Inhalation every 6 hours  ipratropium 17 MICROgram(s) HFA Inhaler 1 Puff(s) Inhalation every 6 hours  dextrose 5% + sodium chloride 0.45%. 1000 milliLiter(s) (80 mL/Hr) IV Continuous <Continuous>    MEDICATIONS  (PRN):  HYDROmorphone PCA (1 mG/mL) Rescue Clinician Bolus 0.5 milliGRAM(s) IV Push every 15 minutes PRN for Pain Scale GREATER THAN 6  naloxone Injectable 0.1 milliGRAM(s) IV Push every 3 minutes PRN For ANY of the following changes in patient status:  A. RR LESS THAN 10 breaths per minute, B. Oxygen saturation LESS THAN 90%, C. Sedation score of 6  ondansetron Injectable 4 milliGRAM(s) IV Push every 6 hours PRN Nausea      OBJECTIVE:    Sedation Score:	[ X] Alert	[ ] Drowsy 	[ ] Arousable	[ ] Asleep	[ ] Unresponsive    Side Effects:	[X ] None	[ ] Nausea	[ ] Vomiting	[ ] Pruritus  		[ ] Other:    Vital Signs Last 24 Hrs  T(C): 36.7 (17 Aug 2017 12:00), Max: 37 (16 Aug 2017 16:00)  T(F): 98.1 (17 Aug 2017 12:00), Max: 98.6 (16 Aug 2017 16:00)  HR: 80 (17 Aug 2017 13:00) (73 - 99)  BP: 146/59 (17 Aug 2017 13:00) (114/62 - 159/67)  BP(mean): 79 (17 Aug 2017 13:00) (63 - 92)  RR: 17 (17 Aug 2017 13:00) (14 - 24)  SpO2: 96% (17 Aug 2017 13:00) (95% - 100%)    ASSESSMENT/ PLAN    Therapy to  be:	[ X] Continue   [ ] Discontinued   [ ] Change to prn Analgesics    Documentation and Verification of current medications:   [X] Done	[ ] Not done, not elligible    Comments: still with NGT.    Patient was seen 08-17-17 @ 14:02

## 2017-08-17 NOTE — PROGRESS NOTE ADULT - SUBJECTIVE AND OBJECTIVE BOX
D TEAM SURGERY DAILY PROGRESS NOTE:       Subjective: patient examined at bedside. No issues overnight. Did not require pressors overnight.  Pain well controlled. Voiding without issue with López in place. Tolerating TF. Denied nausea vomiting CP, SOB, difficulty breathing, or uncontrolled pain.       Objective:  Gen: NAD, AAOx3  Abd: soft, nt/nd. NGT to suction with bilious fluid. GINA drain with serosanguinous fluid.       MEDICATIONS  (STANDING):  HYDROmorphone PCA (1 mG/mL) 30 milliLiter(s) PCA Continuous PCA Continuous  heparin  Injectable 5000 Unit(s) SubCutaneous every 8 hours  aspirin Suppository 300 milliGRAM(s) Rectal daily  phenytoin  IVPB 200 milliGRAM(s) IV Intermittent two times a day  dexamethasone  Injectable 1 milliGRAM(s) IV Push daily  pantoprazole  Injectable 40 milliGRAM(s) IV Push daily  chlorhexidine 4% Liquid 1 Application(s) Topical daily  ALBUTerol    90 MICROgram(s) HFA Inhaler 2 Puff(s) Inhalation every 6 hours  ipratropium 17 MICROgram(s) HFA Inhaler 1 Puff(s) Inhalation every 6 hours  lactated ringers. 500 milliLiter(s) (250 mL/Hr) IV Continuous <Continuous>  dextrose 5% + sodium chloride 0.45%. 1000 milliLiter(s) (80 mL/Hr) IV Continuous <Continuous>    MEDICATIONS  (PRN):  HYDROmorphone PCA (1 mG/mL) Rescue Clinician Bolus 0.5 milliGRAM(s) IV Push every 15 minutes PRN for Pain Scale GREATER THAN 6  naloxone Injectable 0.1 milliGRAM(s) IV Push every 3 minutes PRN For ANY of the following changes in patient status:  A. RR LESS THAN 10 breaths per minute, B. Oxygen saturation LESS THAN 90%, C. Sedation score of 6  ondansetron Injectable 4 milliGRAM(s) IV Push every 6 hours PRN Nausea  albumin human  5% IVPB 250 milliLiter(s) IV Intermittent once PRN for urine output < 20 ml/hr      Vital Signs Last 24 Hrs  T(C): 36.6 (17 Aug 2017 08:00), Max: 37 (16 Aug 2017 16:00)  T(F): 97.8 (17 Aug 2017 08:00), Max: 98.6 (16 Aug 2017 16:00)  HR: 82 (17 Aug 2017 09:05) (73 - 101)  BP: 149/64 (17 Aug 2017 09:00) (114/62 - 159/67)  BP(mean): 71 (17 Aug 2017 09:00) (63 - 92)  RR: 21 (17 Aug 2017 09:00) (15 - 26)  SpO2: 95% (17 Aug 2017 09:05) (95% - 100%)    I&O's Detail    16 Aug 2017 07:01  -  17 Aug 2017 07:00  --------------------------------------------------------  IN:    dextrose 5% + sodium chloride 0.45%.: 1840 mL    Enteral Tube Flush: 100 mL    Enteral Tube Flush: 100 mL    IV PiggyBack: 150 mL    Jevity: 210 mL  Total IN: 2400 mL    OUT:    Bulb: 55 mL    Chest Tube: 170 mL    Indwelling Catheter - Urethral: 1610 mL    Nasoenteral Tube: 350 mL  Total OUT: 2185 mL    Total NET: 215 mL      17 Aug 2017 07:01  -  17 Aug 2017 09:31  --------------------------------------------------------  IN:    dextrose 5% + sodium chloride 0.45%.: 80 mL    Enteral Tube Flush: 20 mL    Enteral Tube Flush: 20 mL    IV PiggyBack: 100 mL    Jevity: 40 mL  Total IN: 260 mL    OUT:    Chest Tube: 50 mL    Indwelling Catheter - Urethral: 80 mL  Total OUT: 130 mL    Total NET: 130 mL          Daily     Daily     LABS:                        8.5    10.46 )-----------( 154      ( 17 Aug 2017 03:00 )             25.6     08-17    142  |  105  |  15  ----------------------------<  134<H>  3.2<L>   |  27  |  0.88    Ca    9.0      17 Aug 2017 03:00  Phos  2.3     08-17  Mg     2.3     08-17            RADIOLOGY & ADDITIONAL STUDIES:

## 2017-08-17 NOTE — PROGRESS NOTE ADULT - SUBJECTIVE AND OBJECTIVE BOX
MEJIA LATIF:[id number],   82yMale followed for:  penicillin (Rash)    PAST MEDICAL & SURGICAL HISTORY:  Coronary artery disease: s/p 3 stents on June 30, 2017 at [facility] Dr. Lavelle Reid  OA (osteoarthritis) of knee: right  Former smoker, stopped smoking in distant past  Former smoker  Rheumatoid arthritis  Seizure disorder: 1 episode approximately 15 yrs ago  Asthma  Hyperlipidemia  BPH (benign prostatic hyperplasia)  HTN (hypertension)  Esophagus cancer  Chronic allergic rhinitis  BCC (basal cell carcinoma): skin  Cerebrovascular accident (CVA)  Anxiety  History of tonsillectomy  H/O heart artery stent: June 30, 2017  Knee arthropathy: left  History of total hip replacement: left  History of hernia repair    FAMILY HISTORY:  Family history of heart attack (Sibling)  Family history of pulmonary embolism (Father): father @ 49 yr old  FH: CAD (coronary artery disease) (Mother)    MEDICATIONS  (STANDING):  HYDROmorphone PCA (1 mG/mL) 30 milliLiter(s) PCA Continuous PCA Continuous  heparin  Injectable 5000 Unit(s) SubCutaneous every 8 hours  aspirin Suppository 300 milliGRAM(s) Rectal daily  phenytoin  IVPB 200 milliGRAM(s) IV Intermittent two times a day  dexamethasone  Injectable 1 milliGRAM(s) IV Push daily  pantoprazole  Injectable 40 milliGRAM(s) IV Push daily  chlorhexidine 4% Liquid 1 Application(s) Topical daily  ALBUTerol    90 MICROgram(s) HFA Inhaler 2 Puff(s) Inhalation every 6 hours  ipratropium 17 MICROgram(s) HFA Inhaler 1 Puff(s) Inhalation every 6 hours  lactated ringers. 500 milliLiter(s) (250 mL/Hr) IV Continuous <Continuous>  dextrose 5% + sodium chloride 0.45%. 1000 milliLiter(s) (80 mL/Hr) IV Continuous <Continuous>    MEDICATIONS  (PRN):  HYDROmorphone PCA (1 mG/mL) Rescue Clinician Bolus 0.5 milliGRAM(s) IV Push every 15 minutes PRN for Pain Scale GREATER THAN 6  naloxone Injectable 0.1 milliGRAM(s) IV Push every 3 minutes PRN For ANY of the following changes in patient status:  A. RR LESS THAN 10 breaths per minute, B. Oxygen saturation LESS THAN 90%, C. Sedation score of 6  ondansetron Injectable 4 milliGRAM(s) IV Push every 6 hours PRN Nausea  albumin human  5% IVPB 250 milliLiter(s) IV Intermittent once PRN for urine output < 20 ml/hr      Vital Signs Last 24 Hrs  T(C): 36.9 (17 Aug 2017 00:00), Max: 37 (16 Aug 2017 16:00)  T(F): 98.4 (17 Aug 2017 00:00), Max: 98.6 (16 Aug 2017 16:00)  HR: 74 (17 Aug 2017 07:00) (73 - 101)  BP: 136/61 (17 Aug 2017 06:00) (114/62 - 159/67)  BP(mean): 81 (17 Aug 2017 06:00) (63 - 92)  RR: 16 (17 Aug 2017 07:00) (16 - 26)  SpO2: 98% (17 Aug 2017 07:00) (97% - 100%)  nc/at  s1s2  cta  soft, nt, nd no guarding or rebound  no c/c/e    CBC Full  -  ( 17 Aug 2017 03:00 )  WBC Count : 10.46 K/uL  Hemoglobin : 8.5 g/dL  Hematocrit : 25.6 %  Platelet Count - Automated : 154 K/uL  Mean Cell Volume : 98.1 fL  Mean Cell Hemoglobin : 32.6 pg  Mean Cell Hemoglobin Concentration : 33.2 %  Auto Neutrophil # : 9.16 K/uL  Auto Lymphocyte # : 0.50 K/uL  Auto Monocyte # : 0.61 K/uL  Auto Eosinophil # : 0.13 K/uL  Auto Basophil # : 0.02 K/uL  Auto Neutrophil % : 87.6 %  Auto Lymphocyte % : 4.8 %  Auto Monocyte % : 5.8 %  Auto Eosinophil % : 1.2 %  Auto Basophil % : 0.2 %    08-17    142  |  105  |  15  ----------------------------<  134<H>  3.2<L>   |  27  |  0.88    Ca    9.0      17 Aug 2017 03:00  Phos  2.3     08-17  Mg     2.3     08-17

## 2017-08-17 NOTE — PROGRESS NOTE ADULT - SUBJECTIVE AND OBJECTIVE BOX
s/p robotic-assisted laparoscopic [facility] esophagogastrectomy,    pt says he feels better , denies chest pain, shortness of breath, nausea, vomiting, intermittent cough +    MEDICATIONS  (STANDING):  HYDROmorphone PCA (1 mG/mL) 30 milliLiter(s) PCA Continuous PCA Continuous  heparin  Injectable 5000 Unit(s) SubCutaneous every 8 hours  aspirin Suppository 300 milliGRAM(s) Rectal daily  phenytoin  IVPB 200 milliGRAM(s) IV Intermittent two times a day  dexamethasone  Injectable 1 milliGRAM(s) IV Push daily  pantoprazole  Injectable 40 milliGRAM(s) IV Push daily  chlorhexidine 4% Liquid 1 Application(s) Topical daily  ALBUTerol    90 MICROgram(s) HFA Inhaler 2 Puff(s) Inhalation every 6 hours  ipratropium 17 MICROgram(s) HFA Inhaler 1 Puff(s) Inhalation every 6 hours  dextrose 5% + sodium chloride 0.45%. 1000 milliLiter(s) (80 mL/Hr) IV Continuous <Continuous>  potassium chloride  10 mEq/100 mL IVPB 10 milliEquivalent(s) IV Intermittent every 1 hour    MEDICATIONS  (PRN):  HYDROmorphone PCA (1 mG/mL) Rescue Clinician Bolus 0.5 milliGRAM(s) IV Push every 15 minutes PRN for Pain Scale GREATER THAN 6  naloxone Injectable 0.1 milliGRAM(s) IV Push every 3 minutes PRN For ANY of the following changes in patient status:  A. RR LESS THAN 10 breaths per minute, B. Oxygen saturation LESS THAN 90%, C. Sedation score of 6  ondansetron Injectable 4 milliGRAM(s) IV Push every 6 hours PRN Nausea      Vital Signs Last 24 Hrs  T(C): 36.3 (17 Aug 2017 20:00), Max: 36.9 (17 Aug 2017 00:00)  T(F): 97.4 (17 Aug 2017 20:00), Max: 98.4 (17 Aug 2017 00:00)  HR: 81 (17 Aug 2017 20:00) (66 - 99)  BP: 146/71 (17 Aug 2017 20:00) (114/62 - 159/67)  BP(mean): 89 (17 Aug 2017 20:00) (63 - 92)  RR: 18 (17 Aug 2017 20:00) (14 - 24)  SpO2: 98% (17 Aug 2017 20:00) (95% - 100%)    Constitutional: No fever, fatigue  Skin: No rash.  Eyes: No recent vision problems or eye pain.  ENT: No congestion, ear pain, or sore throat.  Cardiovascular: No chest pain or palpation.  Respiratory: No cough, shortness of breath, congestion, or wheezing.  Gastrointestinal: No abdominal pain, nausea, vomiting, or diarrhea.  Genitourinary: No dysuria.  Musculoskeletal: No joint swelling.  Neurologic: No headache.    HEENT :peerla, eomi  CVS : s1, s2+  RS :dec breath sounds at bases  ABD : soft, bs+  EXT :no edema  NEURO :alert awake oriented    LABS:  08-17    136  |  102  |  12  ----------------------------<  459<HH>  3.4<L>   |  24  |  0.79    Ca    8.4      17 Aug 2017 15:35  Phos  2.1     08-17  Mg     2.0     08-17      Creatinine Trend: 0.79 <--, 0.88 <--, 1.07 <--, 1.04 <--, 1.05 <--, 1.11 <--                        8.5    10.46 )-----------( 154      ( 17 Aug 2017 03:00 )             25.6     Urine Studies:

## 2017-08-17 NOTE — PROGRESS NOTE ADULT - ASSESSMENT
82 year old man s/p robotic-assisted laparoscopic [facility] esophagogastrectomy    Plan:   - Increase TF to 20 ml/hr  - c/w PCA for pain control  - Monitor GI function   - Monitor drains for change in quality or quantity

## 2017-08-18 LAB
BUN SERPL-MCNC: 13 MG/DL — SIGNIFICANT CHANGE UP (ref 7–23)
CALCIUM SERPL-MCNC: 9.2 MG/DL — SIGNIFICANT CHANGE UP (ref 8.4–10.5)
CHLORIDE SERPL-SCNC: 104 MMOL/L — SIGNIFICANT CHANGE UP (ref 98–107)
CO2 SERPL-SCNC: 27 MMOL/L — SIGNIFICANT CHANGE UP (ref 22–31)
CREAT SERPL-MCNC: 0.82 MG/DL — SIGNIFICANT CHANGE UP (ref 0.5–1.3)
GLUCOSE SERPL-MCNC: 111 MG/DL — HIGH (ref 70–99)
HCT VFR BLD CALC: 28.9 % — LOW (ref 39–50)
HGB BLD-MCNC: 9.3 G/DL — LOW (ref 13–17)
MAGNESIUM SERPL-MCNC: 1.9 MG/DL — SIGNIFICANT CHANGE UP (ref 1.6–2.6)
MCHC RBC-ENTMCNC: 31.3 PG — SIGNIFICANT CHANGE UP (ref 27–34)
MCHC RBC-ENTMCNC: 32.2 % — SIGNIFICANT CHANGE UP (ref 32–36)
MCV RBC AUTO: 97.3 FL — SIGNIFICANT CHANGE UP (ref 80–100)
NRBC # FLD: 0 — SIGNIFICANT CHANGE UP
PHOSPHATE SERPL-MCNC: 2.9 MG/DL — SIGNIFICANT CHANGE UP (ref 2.5–4.5)
PLATELET # BLD AUTO: 190 K/UL — SIGNIFICANT CHANGE UP (ref 150–400)
PMV BLD: 9.3 FL — SIGNIFICANT CHANGE UP (ref 7–13)
POTASSIUM SERPL-MCNC: 4.1 MMOL/L — SIGNIFICANT CHANGE UP (ref 3.5–5.3)
POTASSIUM SERPL-SCNC: 4.1 MMOL/L — SIGNIFICANT CHANGE UP (ref 3.5–5.3)
RBC # BLD: 2.97 M/UL — LOW (ref 4.2–5.8)
RBC # FLD: 13 % — SIGNIFICANT CHANGE UP (ref 10.3–14.5)
SODIUM SERPL-SCNC: 140 MMOL/L — SIGNIFICANT CHANGE UP (ref 135–145)
WBC # BLD: 10.41 K/UL — SIGNIFICANT CHANGE UP (ref 3.8–10.5)
WBC # FLD AUTO: 10.41 K/UL — SIGNIFICANT CHANGE UP (ref 3.8–10.5)

## 2017-08-18 PROCEDURE — 71010: CPT | Mod: 26

## 2017-08-18 RX ORDER — FUROSEMIDE 40 MG
10 TABLET ORAL ONCE
Qty: 0 | Refills: 0 | Status: DISCONTINUED | OUTPATIENT
Start: 2017-08-18 | End: 2017-08-18

## 2017-08-18 RX ORDER — MAGNESIUM SULFATE 500 MG/ML
1 VIAL (ML) INJECTION ONCE
Qty: 0 | Refills: 0 | Status: COMPLETED | OUTPATIENT
Start: 2017-08-18 | End: 2017-08-18

## 2017-08-18 RX ORDER — FUROSEMIDE 40 MG
20 TABLET ORAL ONCE
Qty: 0 | Refills: 0 | Status: COMPLETED | OUTPATIENT
Start: 2017-08-18 | End: 2017-08-18

## 2017-08-18 RX ORDER — POTASSIUM CHLORIDE 20 MEQ
10 PACKET (EA) ORAL
Qty: 0 | Refills: 0 | Status: COMPLETED | OUTPATIENT
Start: 2017-08-18 | End: 2017-08-18

## 2017-08-18 RX ORDER — FUROSEMIDE 40 MG
10 TABLET ORAL ONCE
Qty: 0 | Refills: 0 | Status: COMPLETED | OUTPATIENT
Start: 2017-08-18 | End: 2017-08-18

## 2017-08-18 RX ADMIN — CHLORHEXIDINE GLUCONATE 1 APPLICATION(S): 213 SOLUTION TOPICAL at 08:10

## 2017-08-18 RX ADMIN — HYDROMORPHONE HYDROCHLORIDE 30 MILLILITER(S): 2 INJECTION INTRAMUSCULAR; INTRAVENOUS; SUBCUTANEOUS at 19:29

## 2017-08-18 RX ADMIN — Medication 300 MILLIGRAM(S): at 12:05

## 2017-08-18 RX ADMIN — PANTOPRAZOLE SODIUM 40 MILLIGRAM(S): 20 TABLET, DELAYED RELEASE ORAL at 12:05

## 2017-08-18 RX ADMIN — SODIUM CHLORIDE 50 MILLILITER(S): 9 INJECTION, SOLUTION INTRAVENOUS at 07:22

## 2017-08-18 RX ADMIN — Medication 108 MILLIGRAM(S): at 06:23

## 2017-08-18 RX ADMIN — ALBUTEROL 2 PUFF(S): 90 AEROSOL, METERED ORAL at 21:40

## 2017-08-18 RX ADMIN — ALBUTEROL 2 PUFF(S): 90 AEROSOL, METERED ORAL at 04:15

## 2017-08-18 RX ADMIN — Medication 100 MILLIEQUIVALENT(S): at 16:25

## 2017-08-18 RX ADMIN — Medication 100 MILLIEQUIVALENT(S): at 06:42

## 2017-08-18 RX ADMIN — Medication 1 PUFF(S): at 15:52

## 2017-08-18 RX ADMIN — Medication 20 MILLIGRAM(S): at 05:44

## 2017-08-18 RX ADMIN — Medication 1 PUFF(S): at 04:15

## 2017-08-18 RX ADMIN — HYDROMORPHONE HYDROCHLORIDE 30 MILLILITER(S): 2 INJECTION INTRAMUSCULAR; INTRAVENOUS; SUBCUTANEOUS at 07:23

## 2017-08-18 RX ADMIN — HEPARIN SODIUM 5000 UNIT(S): 5000 INJECTION INTRAVENOUS; SUBCUTANEOUS at 05:50

## 2017-08-18 RX ADMIN — Medication 1 PUFF(S): at 09:56

## 2017-08-18 RX ADMIN — HEPARIN SODIUM 5000 UNIT(S): 5000 INJECTION INTRAVENOUS; SUBCUTANEOUS at 22:21

## 2017-08-18 RX ADMIN — SODIUM CHLORIDE 50 MILLILITER(S): 9 INJECTION, SOLUTION INTRAVENOUS at 19:29

## 2017-08-18 RX ADMIN — Medication 100 MILLIEQUIVALENT(S): at 00:00

## 2017-08-18 RX ADMIN — Medication 108 MILLIGRAM(S): at 17:48

## 2017-08-18 RX ADMIN — ALBUTEROL 2 PUFF(S): 90 AEROSOL, METERED ORAL at 09:56

## 2017-08-18 RX ADMIN — HEPARIN SODIUM 5000 UNIT(S): 5000 INJECTION INTRAVENOUS; SUBCUTANEOUS at 13:41

## 2017-08-18 RX ADMIN — Medication 10 MILLIGRAM(S): at 16:25

## 2017-08-18 RX ADMIN — Medication 100 MILLIEQUIVALENT(S): at 05:34

## 2017-08-18 RX ADMIN — ALBUTEROL 2 PUFF(S): 90 AEROSOL, METERED ORAL at 15:52

## 2017-08-18 RX ADMIN — Medication 1 MILLIGRAM(S): at 05:50

## 2017-08-18 RX ADMIN — Medication 100 MILLIEQUIVALENT(S): at 17:30

## 2017-08-18 RX ADMIN — Medication 100 GRAM(S): at 05:34

## 2017-08-18 RX ADMIN — Medication 1 PUFF(S): at 21:40

## 2017-08-18 NOTE — PROGRESS NOTE ADULT - SUBJECTIVE AND OBJECTIVE BOX
MEJIA MCCORDBA:[id number],   82yMale followed for:  penicillin (Rash)    PAST MEDICAL & SURGICAL HISTORY:  Coronary artery disease: s/p 3 stents on June 30, 2017 at [facility] Dr. Lavelle Reid  OA (osteoarthritis) of knee: right  Former smoker, stopped smoking in distant past  Former smoker  Rheumatoid arthritis  Seizure disorder: 1 episode approximately 15 yrs ago  Asthma  Hyperlipidemia  BPH (benign prostatic hyperplasia)  HTN (hypertension)  Esophagus cancer  Chronic allergic rhinitis  BCC (basal cell carcinoma): skin  Cerebrovascular accident (CVA)  Anxiety  History of tonsillectomy  H/O heart artery stent: June 30, 2017  Knee arthropathy: left  History of total hip replacement: left  History of hernia repair    FAMILY HISTORY:  Family history of heart attack (Sibling)  Family history of pulmonary embolism (Father): father @ 49 yr old  FH: CAD (coronary artery disease) (Mother)    MEDICATIONS  (STANDING):  HYDROmorphone PCA (1 mG/mL) 30 milliLiter(s) PCA Continuous PCA Continuous  heparin  Injectable 5000 Unit(s) SubCutaneous every 8 hours  aspirin Suppository 300 milliGRAM(s) Rectal daily  phenytoin  IVPB 200 milliGRAM(s) IV Intermittent two times a day  dexamethasone  Injectable 1 milliGRAM(s) IV Push daily  pantoprazole  Injectable 40 milliGRAM(s) IV Push daily  chlorhexidine 4% Liquid 1 Application(s) Topical daily  ALBUTerol    90 MICROgram(s) HFA Inhaler 2 Puff(s) Inhalation every 6 hours  ipratropium 17 MICROgram(s) HFA Inhaler 1 Puff(s) Inhalation every 6 hours  dextrose 5% + sodium chloride 0.45%. 1000 milliLiter(s) (50 mL/Hr) IV Continuous <Continuous>    MEDICATIONS  (PRN):  HYDROmorphone PCA (1 mG/mL) Rescue Clinician Bolus 0.5 milliGRAM(s) IV Push every 15 minutes PRN for Pain Scale GREATER THAN 6  naloxone Injectable 0.1 milliGRAM(s) IV Push every 3 minutes PRN For ANY of the following changes in patient status:  A. RR LESS THAN 10 breaths per minute, B. Oxygen saturation LESS THAN 90%, C. Sedation score of 6  ondansetron Injectable 4 milliGRAM(s) IV Push every 6 hours PRN Nausea      Vital Signs Last 24 Hrs  T(C): 36.9 (18 Aug 2017 04:00), Max: 36.9 (18 Aug 2017 04:00)  T(F): 98.4 (18 Aug 2017 04:00), Max: 98.4 (18 Aug 2017 04:00)  HR: 76 (18 Aug 2017 07:00) (66 - 84)  BP: 134/55 (18 Aug 2017 07:00) (116/60 - 149/64)  BP(mean): 74 (18 Aug 2017 07:00) (71 - 91)  RR: 17 (18 Aug 2017 07:00) (14 - 22)  SpO2: 98% (18 Aug 2017 07:00) (95% - 100%)  nc/at  s1s2  cta  soft, nt, nd no guarding or rebound  no c/c/e    CBC Full  -  ( 18 Aug 2017 03:45 )  WBC Count : 10.41 K/uL  Hemoglobin : 9.3 g/dL  Hematocrit : 28.9 %  Platelet Count - Automated : 190 K/uL  Mean Cell Volume : 97.3 fL  Mean Cell Hemoglobin : 31.3 pg  Mean Cell Hemoglobin Concentration : 32.2 %  Auto Neutrophil # : x  Auto Lymphocyte # : x  Auto Monocyte # : x  Auto Eosinophil # : x  Auto Basophil # : x  Auto Neutrophil % : x  Auto Lymphocyte % : x  Auto Monocyte % : x  Auto Eosinophil % : x  Auto Basophil % : x    08-18    140  |  104  |  13  ----------------------------<  111<H>  4.1   |  27  |  0.82    Ca    9.2      18 Aug 2017 03:45  Phos  2.9     08-18  Mg     1.9     08-18

## 2017-08-18 NOTE — PROGRESS NOTE ADULT - SUBJECTIVE AND OBJECTIVE BOX
Anesthesia Pain Management Service    SUBJECTIVE: Pt doing well with IV PCA without problems reported.    Therapy:	  [ X] IV PCA	   [ ] Epidural           [ ] s/p Spinal Opoid              [ ] Postpartum infusion	  [ ] Patient controlled regional anesthesia (PCRA)    [ ] prn Analgesics    Allergies    penicillin (Rash)    Intolerances      MEDICATIONS  (STANDING):  HYDROmorphone PCA (1 mG/mL) 30 milliLiter(s) PCA Continuous PCA Continuous  heparin  Injectable 5000 Unit(s) SubCutaneous every 8 hours  aspirin Suppository 300 milliGRAM(s) Rectal daily  phenytoin  IVPB 200 milliGRAM(s) IV Intermittent two times a day  dexamethasone  Injectable 1 milliGRAM(s) IV Push daily  pantoprazole  Injectable 40 milliGRAM(s) IV Push daily  chlorhexidine 4% Liquid 1 Application(s) Topical daily  ALBUTerol    90 MICROgram(s) HFA Inhaler 2 Puff(s) Inhalation every 6 hours  ipratropium 17 MICROgram(s) HFA Inhaler 1 Puff(s) Inhalation every 6 hours  dextrose 5% + sodium chloride 0.45%. 1000 milliLiter(s) (50 mL/Hr) IV Continuous <Continuous>    MEDICATIONS  (PRN):  HYDROmorphone PCA (1 mG/mL) Rescue Clinician Bolus 0.5 milliGRAM(s) IV Push every 15 minutes PRN for Pain Scale GREATER THAN 6  naloxone Injectable 0.1 milliGRAM(s) IV Push every 3 minutes PRN For ANY of the following changes in patient status:  A. RR LESS THAN 10 breaths per minute, B. Oxygen saturation LESS THAN 90%, C. Sedation score of 6  ondansetron Injectable 4 milliGRAM(s) IV Push every 6 hours PRN Nausea      OBJECTIVE:   [X] No new signs     [ ] Other:    Side Effects:  [X ] None			[ ] Other:    Assessment of Catheter Site:		[ ] Intact		[ ] Other:    ASSESSMENT/PLAN  [ X] Continue current therapy    [ ] Therapy changed to:    [ ] IV PCA       [ ] Epidural     [ ] prn Analgesics     Comments:    Patient was seen 08-18-17 @ 13:58

## 2017-08-18 NOTE — PROGRESS NOTE ADULT - SUBJECTIVE AND OBJECTIVE BOX
s/p robotic-assisted laparoscopic [facility] esophagogastrectomy,    pt says he feels better , denies chest pain, shortness of breath, nausea, vomiting, intermittent cough +    MEDICATIONS  (STANDING):  HYDROmorphone PCA (1 mG/mL) 30 milliLiter(s) PCA Continuous PCA Continuous  heparin  Injectable 5000 Unit(s) SubCutaneous every 8 hours  aspirin Suppository 300 milliGRAM(s) Rectal daily  phenytoin  IVPB 200 milliGRAM(s) IV Intermittent two times a day  dexamethasone  Injectable 1 milliGRAM(s) IV Push daily  pantoprazole  Injectable 40 milliGRAM(s) IV Push daily  chlorhexidine 4% Liquid 1 Application(s) Topical daily  ALBUTerol    90 MICROgram(s) HFA Inhaler 2 Puff(s) Inhalation every 6 hours  ipratropium 17 MICROgram(s) HFA Inhaler 1 Puff(s) Inhalation every 6 hours  dextrose 5% + sodium chloride 0.45%. 1000 milliLiter(s) (50 mL/Hr) IV Continuous <Continuous>    MEDICATIONS  (PRN):  HYDROmorphone PCA (1 mG/mL) Rescue Clinician Bolus 0.5 milliGRAM(s) IV Push every 15 minutes PRN for Pain Scale GREATER THAN 6  naloxone Injectable 0.1 milliGRAM(s) IV Push every 3 minutes PRN For ANY of the following changes in patient status:  A. RR LESS THAN 10 breaths per minute, B. Oxygen saturation LESS THAN 90%, C. Sedation score of 6  ondansetron Injectable 4 milliGRAM(s) IV Push every 6 hours PRN Nausea      Vital Signs Last 24 Hrs  T(C): 36.7 (18 Aug 2017 20:00), Max: 36.9 (18 Aug 2017 04:00)  T(F): 98 (18 Aug 2017 20:00), Max: 98.4 (18 Aug 2017 04:00)  HR: 75 (18 Aug 2017 20:00) (74 - 88)  BP: 108/61 (18 Aug 2017 20:00) (103/56 - 149/61)  BP(mean): 73 (18 Aug 2017 20:00) (66 - 91)  RR: 20 (18 Aug 2017 20:00) (16 - 22)  SpO2: 100% (18 Aug 2017 20:00) (95% - 100%)    Constitutional: No fever, fatigue  Skin: No rash.  Eyes: No recent vision problems or eye pain.  ENT: No congestion, ear pain, or sore throat.  Cardiovascular: No chest pain or palpation.  Respiratory: No cough, shortness of breath, congestion, or wheezing.  Gastrointestinal: No abdominal pain, nausea, vomiting, or diarrhea.  Genitourinary: No dysuria.  Musculoskeletal: No joint swelling.  Neurologic: No headache.    HEENT :peerla, eomi  CVS : s1, s2+  RS :dec breath sounds at bases  ABD : soft, bs+  EXT :no edema  NEURO :alert awake oriented    LABS:  08-18    140  |  104  |  13  ----------------------------<  111<H>  4.1   |  27  |  0.82    Ca    9.2      18 Aug 2017 03:45  Phos  2.9     08-18  Mg     1.9     08-18      Creatinine Trend: 0.82 <--, 0.82 <--, 0.79 <--, 0.88 <--, 1.07 <--, 1.04 <--, 1.05 <--                        9.3    10.41 )-----------( 190      ( 18 Aug 2017 03:45 )             28.9     Urine Studies:

## 2017-08-18 NOTE — PROGRESS NOTE ADULT - SUBJECTIVE AND OBJECTIVE BOX
Day _5_ of Anesthesia Pain Management Service    Allergies    penicillin (Rash)    SUBJECTIVE: " I can't complain."    Pain Scale Score	At rest: _3-4/10_ 	With Activity: ___ 	[ ] Refer to charted pain scores    THERAPY:    [ ] IV PCA Morphine		[ ] 5 mg/mL	[ ] 1 mg/mL  [X] IV PCA Hydromorphone	[ ] 5 mg/mL	[X] 1 mg/mL  [ ] IV PCA Fentanyl		[ ] 50 micrograms/mL    Demand dose _0.2mg_ lockout _6_ (minutes) Continuous Rate _0_ Total: _2mg_  Daily      MEDICATIONS  (STANDING):  HYDROmorphone PCA (1 mG/mL) 30 milliLiter(s) PCA Continuous PCA Continuous  heparin  Injectable 5000 Unit(s) SubCutaneous every 8 hours  aspirin Suppository 300 milliGRAM(s) Rectal daily  phenytoin  IVPB 200 milliGRAM(s) IV Intermittent two times a day  dexamethasone  Injectable 1 milliGRAM(s) IV Push daily  pantoprazole  Injectable 40 milliGRAM(s) IV Push daily  chlorhexidine 4% Liquid 1 Application(s) Topical daily  ALBUTerol    90 MICROgram(s) HFA Inhaler 2 Puff(s) Inhalation every 6 hours  ipratropium 17 MICROgram(s) HFA Inhaler 1 Puff(s) Inhalation every 6 hours  dextrose 5% + sodium chloride 0.45%. 1000 milliLiter(s) (50 mL/Hr) IV Continuous <Continuous>    MEDICATIONS  (PRN):  HYDROmorphone PCA (1 mG/mL) Rescue Clinician Bolus 0.5 milliGRAM(s) IV Push every 15 minutes PRN for Pain Scale GREATER THAN 6  naloxone Injectable 0.1 milliGRAM(s) IV Push every 3 minutes PRN For ANY of the following changes in patient status:  A. RR LESS THAN 10 breaths per minute, B. Oxygen saturation LESS THAN 90%, C. Sedation score of 6  ondansetron Injectable 4 milliGRAM(s) IV Push every 6 hours PRN Nausea      OBJECTIVE: A&Ox3, NAD, sitting up in chair    Sedation Score:	[X] Alert	[ ] Drowsy	[ ] Arousable	[ ] Asleep	[ ] Unresponsive    Side Effects:	[X] None	[ ] Nausea	[ ] Vomiting	[ ] Pruritus  		  [ ] Weakness		[ ] Numbness	[ ] Other:                          9.3    10.41 )-----------( 190      ( 18 Aug 2017 03:45 )             28.9       08-18    140  |  104  |  13  ----------------------------<  111<H>  4.1   |  27  |  0.82    Ca    9.2      18 Aug 2017 03:45  Phos  2.9     08-18  Mg     1.9     08-18        ASSESSMENT/ PLAN    Therapy to  be:	[X] Continue   [ ] Discontinued   [ ] Change to prn Analgesics    Documentation and Verification of current medications:  [X] Done	[ ] Not done, not eligible  [ ] Not done, reason not given    Comments:  NPO with GLORIAube

## 2017-08-18 NOTE — PROGRESS NOTE ADULT - SUBJECTIVE AND OBJECTIVE BOX
MEJIA LATIF            MRN-[id number]         penicillin (Rash)             HPI:  82 yr old male with hx of cancer of esophagus s/p chemo and radiation therapy presents for preop evaluation.  Pt is now scheduled for Esophagogastroduodenoscopy, Thoracoscopic Mobilization of Stomach, Robotic Assisted Laparoscopic Esophagogastrectomy Feeding Jejunostomy, Tube, Possible Open on 07/14/17.  Of note upon physical exam for medical/cardiac clearance pt was sent for coronary ct valenzuela and was noted have blockage, pt then had angiogram and bare metal stents x 3 placed June 30, 2017. (11 Aug 2017 09:35)      Procedure: Esophagogastrectomy / J-tube  POD# 4    Issues:  Hypotension  Respiratory failure  Esophageal cancer  Asthma  Postop pain  CAD s/p stents      Home Medications:   * Patient Currently Takes Medications as of 11-Aug-2017 10:25 documented in Prescription Writer  · 	aspirin 81 mg oral tablet: Last Dose Taken:  , 1 tab(s) orally once a day  last  8/11 dose   · 	ProAir HFA 90 mcg/inh inhalation aerosol: 2 puff(s) inhaled 4 times a day, As Needed   · 	Vitamin B-12 500 mcg oral tablet: 1 tab(s) orally once a day  · 	amLODIPine 5 mg oral tablet: Last Dose Taken:  , 1 tab(s) orally once a day in pm  · 	simvastatin 40 mg oral tablet: Last Dose Taken:  , 1 tab(s) orally once a day in pm  · 	tamsulosin 0.4 mg oral capsule: Last Dose Taken:  , 1 cap(s) orally once a day in pm  · 	Zetia 10 mg oral tablet: Last Dose Taken:  , 1 tab(s) orally once a day in pm  · 	ALPRAZolam 0.25 mg oral tablet: Last Dose Taken:  , 2 tab(s) orally 2 times a day  · 	olmesartan 20 mg oral tablet: Last Dose Taken:  , 1 tab(s) orally once a day in pm  · 	montelukast 10 mg oral tablet: Last Dose Taken:  , 1 tab(s) orally once a day in pm  · 	Dilantin 100 mg oral capsule: Last Dose Taken:  , 2 cap(s) orally 2 times a day  · 	predniSONE 5 mg oral tablet: Last Dose Taken:  , 1 tab(s) orally once a day in am  · 	finasteride 5 mg oral tablet: Last Dose Taken:  , 1 tab(s) orally once a day in pm  · 	Centrum Silver oral tablet: Last Dose Taken:  , 1 tab(s) orally once a day  last dose 8/11  · 	Plavix 75 mg oral tablet: Last Dose Taken:  , 1 tab(s) orally once a day  last dose 8/10  · 	folic acid 0.4 mg oral tablet: 1 tab(s) orally once a day  · 	Vitamin B6 100 mg oral tablet: 1 tab(s) orally once a day        PAST MEDICAL & SURGICAL HISTORY:  Coronary artery disease: s/p 3 stents on June 30, 2017 at [place] Dr. Lavelle Reid  OA (osteoarthritis) of knee: right  Former smoker, stopped smoking in distant past  Former smoker  Rheumatoid arthritis  Seizure disorder: 1 episode approximately 15 yrs ago  Asthma  Hyperlipidemia  BPH (benign prostatic hyperplasia)  HTN (hypertension)  Esophagus cancer  Chronic allergic rhinitis  BCC (basal cell carcinoma): skin  Cerebrovascular accident (CVA)  Anxiety  History of tonsillectomy  H/O heart artery stent: June 30, 2017  Knee arthropathy: left  History of total hip replacement: left  History of hernia repair        ICU Vital Signs Last 24 Hrs  T(C): 36.9 (18 Aug 2017 04:00), Max: 36.9 (18 Aug 2017 04:00)  T(F): 98.4 (18 Aug 2017 04:00), Max: 98.4 (18 Aug 2017 04:00)  HR: 79 (18 Aug 2017 06:00) (66 - 84)  BP: 149/61 (18 Aug 2017 06:00) (116/60 - 149/64)  BP(mean): 82 (18 Aug 2017 06:00) (71 - 91)  ABP: --  ABP(mean): --  RR: 16 (18 Aug 2017 06:00) (14 - 22)  SpO2: 98% (18 Aug 2017 06:00) (95% - 100%)    I&O's Summary    17 Aug 2017 07:01  -  18 Aug 2017 07:00  --------------------------------------------------------  IN: 3470 mL / OUT: 3110 mL / NET: 360 mL      CAPILLARY BLOOD GLUCOSE  120 (17 Aug 2017 16:00)          MEDICATIONS  (STANDING):  HYDROmorphone PCA (1 mG/mL) 30 milliLiter(s) PCA Continuous PCA Continuous  heparin  Injectable 5000 Unit(s) SubCutaneous every 8 hours  aspirin Suppository 300 milliGRAM(s) Rectal daily  phenytoin  IVPB 200 milliGRAM(s) IV Intermittent two times a day  dexamethasone  Injectable 1 milliGRAM(s) IV Push daily  pantoprazole  Injectable 40 milliGRAM(s) IV Push daily  chlorhexidine 4% Liquid 1 Application(s) Topical daily  ALBUTerol    90 MICROgram(s) HFA Inhaler 2 Puff(s) Inhalation every 6 hours  ipratropium 17 MICROgram(s) HFA Inhaler 1 Puff(s) Inhalation every 6 hours  dextrose 5% + sodium chloride 0.45%. 1000 milliLiter(s) (50 mL/Hr) IV Continuous <Continuous>    MEDICATIONS  (PRN):  HYDROmorphone PCA (1 mG/mL) Rescue Clinician Bolus 0.5 milliGRAM(s) IV Push every 15 minutes PRN for Pain Scale GREATER THAN 6  naloxone Injectable 0.1 milliGRAM(s) IV Push every 3 minutes PRN For ANY of the following changes in patient status:  A. RR LESS THAN 10 breaths per minute, B. Oxygen saturation LESS THAN 90%, C. Sedation score of 6  ondansetron Injectable 4 milliGRAM(s) IV Push every 6 hours PRN Nausea      Physical exam:                             General:               Pt is awake, alert, OOB in chair not in any distress                                                  Neuro:                  Nonfocal                             Cardiovascular:   S1 & S2, regular                           Respiratory:         Air entry is fair and equal on both sides, has bilateral conducted sounds                           GI:                          Soft, nondistended and nontender, Bowel sounds active                            Ext:                        No cyanosis or edema /   bilateral pedal edema    Labs:                                                                           9.3    10.41 )-----------( 190      ( 18 Aug 2017 03:45 )             28.9             08-18    140  |  104  |  13  ----------------------------<  111<H>  4.1   |  27  |  0.82    Ca    9.2      18 Aug 2017 03:45  Phos  2.9     08-18  Mg     1.9     08-18                        CXR: 8/17/17    Increase in left basilar and retrocardiac opacity possibly increasing   pleural effusion with passive atelectasis although atelectasis of other   cause and/or pneumonia is not excluded.      Plan:    82yMale Hx of Esophageal cancer s/p Esophagogastrectomy & J-tube placement POD# 4, intubated, currently off propofol.                             Neuro:                                         Pain control with Tylenol    Seizure disorder: On Dilantin IV 200bid                            Cardiovascular:                                          Continue hemodynamic monitoring    Continue IVF 50CC/hr    CAD: ASA 300mg OH daily, No plavix                            Respiratory:                                         NC       Pulmonary toilet                                         Monitor chest tube output                                         Chest tube to suction                                                                  Continue bronchodilators, pulmonary toilet     Monitor subcutaneous emphysema                            GI                                         NPO with J-tube feeds                                         Continue GI prophylaxis with Protonix                                         Continue Zofran / Reglan for nausea - PRN	                                         NGT & J-tube flushes Q4hrs                            Renal:                                         Continue LR 50CC/hr                                         Monitor I/Os and electrolytes                                         López                                                  Hem/ Onc:                                                                                  Monitor chest tube output &  signs of bleeding.                                          Follow CBC in AM                           Infectious disease:                                            No signs of infection. Monitor for fever / leukocytosis.                                          All surgical incision / chest tube  sites look clean                            Endocrine                                             Continue Accu-Checks with coverage       Stress  Decadron 1mg IV daily         Pertinent clinical, laboratory, radiographic, hemodynamic, echocardiographic, respiratory data, microbiologic data and chart were reviewed and analyzed frequently throughout the course of the day and night  Patient seen, examined and plan discussed with CT Surgeon  / CTICU team during rounds.    Pt's status discussed with family at bedside, updated status            Jose Sharif MD

## 2017-08-18 NOTE — DIETITIAN INITIAL EVALUATION ADULT. - OTHER INFO
Nutrition assessment initiated for critical care LOS. Pt. alert, oriented, was taking PO nutrition , reports wt. loss prior to admission 2/2 cancer and treatment - chemo . Pt. also reports no known food allergies , no issues GI

## 2017-08-18 NOTE — PROGRESS NOTE ADULT - SUBJECTIVE AND OBJECTIVE BOX
D Team Surgery  Daily Progress Note    24 Hours: No flatus or BM. Denies any nausea or abdominal pain. Some coughing with production of clear phlegm. No SOB. Ambulatory. Vitally stable. Tolerating tube feeds.     Physical Exam:  - General: alert, sitting in chair, no acute distress, interactive   - HEENT: NGT with non-bloody output   - Abd: soft, NTD, GINA drain with serous output  - Chest: CT with serous output     Assessment: Robert De Luna is an 82 y.o. man with history of smoking (distant), seizure disorder (on phenytoin), and CVA who is POD 4 from [facility] esophagectomy for esophageal cancer (s/p chemo and radiation). Pain is well controlled and tolerating tube feeds. Still waiting on GI function. Serous quality of drainage shows no indication of leaks. Will keep in place at least for the next couple of days to monitor the anastomosis and pleural effusion. Will likely d/c chest tube prior to GINA.     Plan:   - Advance tube feeds to goal  - Ambulate    - PCA for pain control  - Monitor GI function  - Monitor drains for changes in quantity or quality of drainage     __________________________________________________________________    ICU Vital Signs Last 24 Hrs  T(C): 36.9 (18 Aug 2017 04:00), Max: 36.9 (18 Aug 2017 04:00)  T(F): 98.4 (18 Aug 2017 04:00), Max: 98.4 (18 Aug 2017 04:00)  HR: 76 (18 Aug 2017 07:00) (66 - 84)  BP: 134/55 (18 Aug 2017 07:00) (116/60 - 149/64)  BP(mean): 74 (18 Aug 2017 07:00) (71 - 91)  ABP: --  ABP(mean): --  RR: 17 (18 Aug 2017 07:00) (14 - 22)  SpO2: 98% (18 Aug 2017 07:00) (95% - 100%)    I&O's Detail    17 Aug 2017 07:01  -  18 Aug 2017 07:00  --------------------------------------------------------  IN:    dextrose 5% + sodium chloride 0.45%.: 1590 mL    Enteral Tube Flush: 120 mL    Enteral Tube Flush: 120 mL    IV PiggyBack: 1150 mL    Jevity: 490 mL  Total IN: 3470 mL    OUT:    Bulb: 30 mL    Chest Tube: 170 mL    Indwelling Catheter - Urethral: 2360 mL    Nasoenteral Tube: 550 mL  Total OUT: 3110 mL    Total NET: 360 mL      18 Aug 2017 07:01  -  18 Aug 2017 07:47  --------------------------------------------------------  IN:  Total IN: 0 mL    OUT:    Indwelling Catheter - Urethral: 300 mL  Total OUT: 300 mL    Total NET: -300 mL                          9.3    10.41 )-----------( 190      ( 18 Aug 2017 03:45 )             28.9   08-18    140  |  104  |  13  ----------------------------<  111<H>  4.1   |  27  |  0.82    Ca    9.2      18 Aug 2017 03:45  Phos  2.9     08-18  Mg     1.9     08-18

## 2017-08-18 NOTE — PROGRESS NOTE ADULT - ASSESSMENT
82 year old man s/p robotic-assisted laparoscopic [place] [facility] esophagogastrectomy    Plan:   - Increase TF to 20 ml/hr  - c/w PCA for pain control  - Monitor GI function   - Monitor drains for change in quality or quantity

## 2017-08-19 ENCOUNTER — TRANSCRIPTION ENCOUNTER (OUTPATIENT)
Age: 82
End: 2017-08-19

## 2017-08-19 LAB
BUN SERPL-MCNC: 14 MG/DL — SIGNIFICANT CHANGE UP (ref 7–23)
CALCIUM SERPL-MCNC: 8.9 MG/DL — SIGNIFICANT CHANGE UP (ref 8.4–10.5)
CHLORIDE SERPL-SCNC: 102 MMOL/L — SIGNIFICANT CHANGE UP (ref 98–107)
CO2 SERPL-SCNC: 28 MMOL/L — SIGNIFICANT CHANGE UP (ref 22–31)
CREAT SERPL-MCNC: 0.78 MG/DL — SIGNIFICANT CHANGE UP (ref 0.5–1.3)
GLUCOSE SERPL-MCNC: 115 MG/DL — HIGH (ref 70–99)
HCT VFR BLD CALC: 27.9 % — LOW (ref 39–50)
HGB BLD-MCNC: 9.2 G/DL — LOW (ref 13–17)
MAGNESIUM SERPL-MCNC: 1.9 MG/DL — SIGNIFICANT CHANGE UP (ref 1.6–2.6)
MCHC RBC-ENTMCNC: 31.9 PG — SIGNIFICANT CHANGE UP (ref 27–34)
MCHC RBC-ENTMCNC: 33 % — SIGNIFICANT CHANGE UP (ref 32–36)
MCV RBC AUTO: 96.9 FL — SIGNIFICANT CHANGE UP (ref 80–100)
NRBC # FLD: 0 — SIGNIFICANT CHANGE UP
PHOSPHATE SERPL-MCNC: 2.8 MG/DL — SIGNIFICANT CHANGE UP (ref 2.5–4.5)
PLATELET # BLD AUTO: 192 K/UL — SIGNIFICANT CHANGE UP (ref 150–400)
PMV BLD: 9.6 FL — SIGNIFICANT CHANGE UP (ref 7–13)
POTASSIUM SERPL-MCNC: 3.9 MMOL/L — SIGNIFICANT CHANGE UP (ref 3.5–5.3)
POTASSIUM SERPL-SCNC: 3.9 MMOL/L — SIGNIFICANT CHANGE UP (ref 3.5–5.3)
RBC # BLD: 2.88 M/UL — LOW (ref 4.2–5.8)
RBC # FLD: 12.9 % — SIGNIFICANT CHANGE UP (ref 10.3–14.5)
SODIUM SERPL-SCNC: 139 MMOL/L — SIGNIFICANT CHANGE UP (ref 135–145)
WBC # BLD: 10.27 K/UL — SIGNIFICANT CHANGE UP (ref 3.8–10.5)
WBC # FLD AUTO: 10.27 K/UL — SIGNIFICANT CHANGE UP (ref 3.8–10.5)

## 2017-08-19 PROCEDURE — 99233 SBSQ HOSP IP/OBS HIGH 50: CPT

## 2017-08-19 PROCEDURE — 71010: CPT | Mod: 26,77

## 2017-08-19 PROCEDURE — 71010: CPT | Mod: 26

## 2017-08-19 RX ORDER — POTASSIUM CHLORIDE 20 MEQ
10 PACKET (EA) ORAL ONCE
Qty: 0 | Refills: 0 | Status: COMPLETED | OUTPATIENT
Start: 2017-08-19 | End: 2017-08-19

## 2017-08-19 RX ORDER — MAGNESIUM SULFATE 500 MG/ML
1 VIAL (ML) INJECTION ONCE
Qty: 0 | Refills: 0 | Status: COMPLETED | OUTPATIENT
Start: 2017-08-19 | End: 2017-08-19

## 2017-08-19 RX ORDER — FUROSEMIDE 40 MG
10 TABLET ORAL ONCE
Qty: 0 | Refills: 0 | Status: COMPLETED | OUTPATIENT
Start: 2017-08-19 | End: 2017-08-19

## 2017-08-19 RX ORDER — IPRATROPIUM BROMIDE 0.2 MG/ML
500 SOLUTION, NON-ORAL INHALATION EVERY 6 HOURS
Qty: 0 | Refills: 0 | Status: DISCONTINUED | OUTPATIENT
Start: 2017-08-19 | End: 2017-08-21

## 2017-08-19 RX ORDER — ACETAMINOPHEN 500 MG
1000 TABLET ORAL ONCE
Qty: 0 | Refills: 0 | Status: COMPLETED | OUTPATIENT
Start: 2017-08-19 | End: 2017-08-19

## 2017-08-19 RX ORDER — ALBUMIN HUMAN 25 %
250 VIAL (ML) INTRAVENOUS ONCE
Qty: 0 | Refills: 0 | Status: COMPLETED | OUTPATIENT
Start: 2017-08-19 | End: 2017-08-19

## 2017-08-19 RX ORDER — DORNASE ALFA 1 MG/ML
2.5 SOLUTION RESPIRATORY (INHALATION) DAILY
Qty: 0 | Refills: 0 | Status: COMPLETED | OUTPATIENT
Start: 2017-08-19 | End: 2017-08-21

## 2017-08-19 RX ORDER — ALBUTEROL 90 UG/1
2 AEROSOL, METERED ORAL EVERY 4 HOURS
Qty: 0 | Refills: 0 | Status: DISCONTINUED | OUTPATIENT
Start: 2017-08-19 | End: 2017-08-20

## 2017-08-19 RX ORDER — DEXTROSE MONOHYDRATE, SODIUM CHLORIDE, AND POTASSIUM CHLORIDE 50; .745; 4.5 G/1000ML; G/1000ML; G/1000ML
1000 INJECTION, SOLUTION INTRAVENOUS
Qty: 0 | Refills: 0 | Status: DISCONTINUED | OUTPATIENT
Start: 2017-08-19 | End: 2017-08-20

## 2017-08-19 RX ADMIN — Medication 1 PUFF(S): at 04:33

## 2017-08-19 RX ADMIN — Medication 100 MILLIEQUIVALENT(S): at 06:41

## 2017-08-19 RX ADMIN — ALBUTEROL 2 PUFF(S): 90 AEROSOL, METERED ORAL at 10:04

## 2017-08-19 RX ADMIN — Medication 500 MICROGRAM(S): at 15:16

## 2017-08-19 RX ADMIN — HYDROMORPHONE HYDROCHLORIDE 30 MILLILITER(S): 2 INJECTION INTRAMUSCULAR; INTRAVENOUS; SUBCUTANEOUS at 07:14

## 2017-08-19 RX ADMIN — Medication 1000 MILLIGRAM(S): at 17:08

## 2017-08-19 RX ADMIN — HEPARIN SODIUM 5000 UNIT(S): 5000 INJECTION INTRAVENOUS; SUBCUTANEOUS at 15:30

## 2017-08-19 RX ADMIN — CHLORHEXIDINE GLUCONATE 1 APPLICATION(S): 213 SOLUTION TOPICAL at 06:01

## 2017-08-19 RX ADMIN — ALBUTEROL 2 PUFF(S): 90 AEROSOL, METERED ORAL at 04:33

## 2017-08-19 RX ADMIN — DEXTROSE MONOHYDRATE, SODIUM CHLORIDE, AND POTASSIUM CHLORIDE 20 MILLILITER(S): 50; .745; 4.5 INJECTION, SOLUTION INTRAVENOUS at 20:19

## 2017-08-19 RX ADMIN — PANTOPRAZOLE SODIUM 40 MILLIGRAM(S): 20 TABLET, DELAYED RELEASE ORAL at 12:02

## 2017-08-19 RX ADMIN — HEPARIN SODIUM 5000 UNIT(S): 5000 INJECTION INTRAVENOUS; SUBCUTANEOUS at 05:37

## 2017-08-19 RX ADMIN — HEPARIN SODIUM 5000 UNIT(S): 5000 INJECTION INTRAVENOUS; SUBCUTANEOUS at 21:44

## 2017-08-19 RX ADMIN — Medication 100 GRAM(S): at 13:19

## 2017-08-19 RX ADMIN — Medication 333.33 MILLILITER(S): at 13:19

## 2017-08-19 RX ADMIN — Medication 108 MILLIGRAM(S): at 05:37

## 2017-08-19 RX ADMIN — SODIUM CHLORIDE 50 MILLILITER(S): 9 INJECTION, SOLUTION INTRAVENOUS at 07:14

## 2017-08-19 RX ADMIN — Medication 10 MILLIGRAM(S): at 14:30

## 2017-08-19 RX ADMIN — HYDROMORPHONE HYDROCHLORIDE 30 MILLILITER(S): 2 INJECTION INTRAMUSCULAR; INTRAVENOUS; SUBCUTANEOUS at 19:28

## 2017-08-19 RX ADMIN — Medication 500 MICROGRAM(S): at 21:14

## 2017-08-19 RX ADMIN — Medication 1 MILLIGRAM(S): at 05:36

## 2017-08-19 RX ADMIN — Medication 400 MILLIGRAM(S): at 16:53

## 2017-08-19 RX ADMIN — Medication 108 MILLIGRAM(S): at 17:38

## 2017-08-19 RX ADMIN — DORNASE ALFA 2.5 MILLIGRAM(S): 1 SOLUTION RESPIRATORY (INHALATION) at 14:33

## 2017-08-19 RX ADMIN — Medication 1 PUFF(S): at 10:04

## 2017-08-19 RX ADMIN — Medication 300 MILLIGRAM(S): at 12:02

## 2017-08-19 RX ADMIN — DEXTROSE MONOHYDRATE, SODIUM CHLORIDE, AND POTASSIUM CHLORIDE 20 MILLILITER(S): 50; .745; 4.5 INJECTION, SOLUTION INTRAVENOUS at 16:41

## 2017-08-19 NOTE — PROGRESS NOTE ADULT - ASSESSMENT
s/p [place] Trent esophagectomy for esophageal cancer     Plan:   - c/w TF at goal  - D/C López  - Ambulate    - PCA for pain control  - Monitor GI function  - Monitor drains for changes in quantity or quality of drainage

## 2017-08-19 NOTE — PROGRESS NOTE ADULT - ASSESSMENT
Robert DeL una 82 M POD 5 s/p V[facility] Trent esophagectomy for esophageal cancer     Plan:   - c/w TF at goal  - D/C López  - Ambulate    - PCA for pain control  - Monitor GI function  - Monitor drains for changes in quantity or quality of drainage

## 2017-08-19 NOTE — PROGRESS NOTE ADULT - SUBJECTIVE AND OBJECTIVE BOX
s/p robotic-assisted laparoscopic [facility] esophagogastrectomy,    pt says he feels better , denies chest pain, shortness of breath, nausea, vomiting, intermittent cough +    MEDICATIONS  (STANDING):  HYDROmorphone PCA (1 mG/mL) 30 milliLiter(s) PCA Continuous PCA Continuous  heparin  Injectable 5000 Unit(s) SubCutaneous every 8 hours  aspirin Suppository 300 milliGRAM(s) Rectal daily  phenytoin  IVPB 200 milliGRAM(s) IV Intermittent two times a day  dexamethasone  Injectable 1 milliGRAM(s) IV Push daily  pantoprazole  Injectable 40 milliGRAM(s) IV Push daily  chlorhexidine 4% Liquid 1 Application(s) Topical daily  ipratropium 17 MICROgram(s) HFA Inhaler 1 Puff(s) Inhalation every 6 hours  dornase ziyad Solution 2.5 milliGRAM(s) Inhalation daily  furosemide   Injectable 10 milliGRAM(s) IV Push once  dextrose 5% + sodium chloride 0.9% with potassium chloride 20 mEq/L 1000 milliLiter(s) (20 mL/Hr) IV Continuous <Continuous>    MEDICATIONS  (PRN):  HYDROmorphone PCA (1 mG/mL) Rescue Clinician Bolus 0.5 milliGRAM(s) IV Push every 15 minutes PRN for Pain Scale GREATER THAN 6  naloxone Injectable 0.1 milliGRAM(s) IV Push every 3 minutes PRN For ANY of the following changes in patient status:  A. RR LESS THAN 10 breaths per minute, B. Oxygen saturation LESS THAN 90%, C. Sedation score of 6  ondansetron Injectable 4 milliGRAM(s) IV Push every 6 hours PRN Nausea  ALBUTerol    90 MICROgram(s) HFA Inhaler 2 Puff(s) Inhalation every 4 hours PRN Shortness of Breath    Vital Signs Last 24 Hrs  T(C): 36.9 (19 Aug 2017 12:00), Max: 37.2 (19 Aug 2017 04:00)  T(F): 98.5 (19 Aug 2017 12:00), Max: 98.9 (19 Aug 2017 04:00)  HR: 84 (19 Aug 2017 13:00) (75 - 88)  BP: 103/51 (19 Aug 2017 13:00) (87/45 - 141/55)  BP(mean): 65 (19 Aug 2017 13:00) (52 - 79)  RR: 16 (19 Aug 2017 13:00) (13 - 22)  SpO2: 98% (19 Aug 2017 13:00) (69% - 100%)      Constitutional: No fever, fatigue  Skin: No rash.  Eyes: No recent vision problems or eye pain.  ENT: No congestion, ear pain, or sore throat.  Cardiovascular: No chest pain or palpation.  Respiratory: No cough, shortness of breath, congestion, or wheezing.  Gastrointestinal: No abdominal pain, nausea, vomiting, or diarrhea.  Genitourinary: No dysuria.  Musculoskeletal: No joint swelling.  Neurologic: No headache.    HEENT :peerla, eomi  CVS : s1, s2+  RS :dec breath sounds at bases  ABD : soft, bs+  EXT :no edema  NEURO :alert awake oriented    LABS:  08-19    139  |  102  |  14  ----------------------------<  115<H>  3.9   |  28  |  0.78    Ca    8.9      19 Aug 2017 02:25  Phos  2.8     08-19  Mg     1.9     08-19      Creatinine Trend: 0.78 <--, 0.82 <--, 0.82 <--, 0.79 <--, 0.88 <--, 1.07 <--, 1.04 <--, 1.05 <--                        9.2    10.27 )-----------( 192      ( 19 Aug 2017 02:25 )             27.9     Urine Studies:

## 2017-08-19 NOTE — PROGRESS NOTE ADULT - SUBJECTIVE AND OBJECTIVE BOX
MEJIA MCCORDBA:[id number],   82yMale followed for:  penicillin (Rash)    PAST MEDICAL & SURGICAL HISTORY:  Coronary artery disease: s/p 3 stents on June 30, 2017 at [facility] Dr. Lavelle Reid  OA (osteoarthritis) of knee: right  Former smoker, stopped smoking in distant past  Former smoker  Rheumatoid arthritis  Seizure disorder: 1 episode approximately 15 yrs ago  Asthma  Hyperlipidemia  BPH (benign prostatic hyperplasia)  HTN (hypertension)  Esophagus cancer  Chronic allergic rhinitis  BCC (basal cell carcinoma): skin  Cerebrovascular accident (CVA)  Anxiety  History of tonsillectomy  H/O heart artery stent: June 30, 2017  Knee arthropathy: left  History of total hip replacement: left  History of hernia repair    FAMILY HISTORY:  Family history of heart attack (Sibling)  Family history of pulmonary embolism (Father): father @ 49 yr old  FH: CAD (coronary artery disease) (Mother)    MEDICATIONS  (STANDING):  HYDROmorphone PCA (1 mG/mL) 30 milliLiter(s) PCA Continuous PCA Continuous  heparin  Injectable 5000 Unit(s) SubCutaneous every 8 hours  aspirin Suppository 300 milliGRAM(s) Rectal daily  phenytoin  IVPB 200 milliGRAM(s) IV Intermittent two times a day  dexamethasone  Injectable 1 milliGRAM(s) IV Push daily  pantoprazole  Injectable 40 milliGRAM(s) IV Push daily  chlorhexidine 4% Liquid 1 Application(s) Topical daily  ALBUTerol    90 MICROgram(s) HFA Inhaler 2 Puff(s) Inhalation every 6 hours  ipratropium 17 MICROgram(s) HFA Inhaler 1 Puff(s) Inhalation every 6 hours  dextrose 5% + sodium chloride 0.45%. 1000 milliLiter(s) (50 mL/Hr) IV Continuous <Continuous>    MEDICATIONS  (PRN):  HYDROmorphone PCA (1 mG/mL) Rescue Clinician Bolus 0.5 milliGRAM(s) IV Push every 15 minutes PRN for Pain Scale GREATER THAN 6  naloxone Injectable 0.1 milliGRAM(s) IV Push every 3 minutes PRN For ANY of the following changes in patient status:  A. RR LESS THAN 10 breaths per minute, B. Oxygen saturation LESS THAN 90%, C. Sedation score of 6  ondansetron Injectable 4 milliGRAM(s) IV Push every 6 hours PRN Nausea      Vital Signs Last 24 Hrs  T(C): 37 (19 Aug 2017 08:00), Max: 37.2 (19 Aug 2017 04:00)  T(F): 98.6 (19 Aug 2017 08:00), Max: 98.9 (19 Aug 2017 04:00)  HR: 83 (19 Aug 2017 08:00) (75 - 88)  BP: 107/52 (19 Aug 2017 08:00) (103/49 - 141/55)  BP(mean): 63 (19 Aug 2017 08:00) (62 - 81)  RR: 15 (19 Aug 2017 08:00) (13 - 22)  SpO2: 96% (19 Aug 2017 08:00) (95% - 100%)  nc/at  s1s2  cta  soft, nt, nd no guarding or rebound  no c/c/e    CBC Full  -  ( 19 Aug 2017 02:25 )  WBC Count : 10.27 K/uL  Hemoglobin : 9.2 g/dL  Hematocrit : 27.9 %  Platelet Count - Automated : 192 K/uL  Mean Cell Volume : 96.9 fL  Mean Cell Hemoglobin : 31.9 pg  Mean Cell Hemoglobin Concentration : 33.0 %  Auto Neutrophil # : x  Auto Lymphocyte # : x  Auto Monocyte # : x  Auto Eosinophil # : x  Auto Basophil # : x  Auto Neutrophil % : x  Auto Lymphocyte % : x  Auto Monocyte % : x  Auto Eosinophil % : x  Auto Basophil % : x    08-19    139  |  102  |  14  ----------------------------<  115<H>  3.9   |  28  |  0.78    Ca    8.9      19 Aug 2017 02:25  Phos  2.8     08-19  Mg     1.9     08-19

## 2017-08-19 NOTE — PROGRESS NOTE ADULT - SUBJECTIVE AND OBJECTIVE BOX
D TEAM SURGERY DAILY PROGRESS NOTE:       Subjective: No issues overnight. Patient examined at bedside this morning. Pain well controlled. Tolerating TF. Denied flatus or BMs. Voiding with López in place. Denied nausea vomiting fever, chills or uncontrolled pain.       Objective:  Gen: NAD, AAOx3  HEENT: NGT with non-bloody output   Abd: soft, nontender, nondistended, GINA drain with serous output  Chest: CT with serous output       MEDICATIONS  (STANDING):  HYDROmorphone PCA (1 mG/mL) 30 milliLiter(s) PCA Continuous PCA Continuous  heparin  Injectable 5000 Unit(s) SubCutaneous every 8 hours  aspirin Suppository 300 milliGRAM(s) Rectal daily  phenytoin  IVPB 200 milliGRAM(s) IV Intermittent two times a day  dexamethasone  Injectable 1 milliGRAM(s) IV Push daily  pantoprazole  Injectable 40 milliGRAM(s) IV Push daily  chlorhexidine 4% Liquid 1 Application(s) Topical daily  ALBUTerol    90 MICROgram(s) HFA Inhaler 2 Puff(s) Inhalation every 6 hours  ipratropium 17 MICROgram(s) HFA Inhaler 1 Puff(s) Inhalation every 6 hours  dextrose 5% + sodium chloride 0.45%. 1000 milliLiter(s) (50 mL/Hr) IV Continuous <Continuous>    MEDICATIONS  (PRN):  HYDROmorphone PCA (1 mG/mL) Rescue Clinician Bolus 0.5 milliGRAM(s) IV Push every 15 minutes PRN for Pain Scale GREATER THAN 6  naloxone Injectable 0.1 milliGRAM(s) IV Push every 3 minutes PRN For ANY of the following changes in patient status:  A. RR LESS THAN 10 breaths per minute, B. Oxygen saturation LESS THAN 90%, C. Sedation score of 6  ondansetron Injectable 4 milliGRAM(s) IV Push every 6 hours PRN Nausea      Vital Signs Last 24 Hrs  T(C): 37 (19 Aug 2017 08:00), Max: 37.2 (19 Aug 2017 04:00)  T(F): 98.6 (19 Aug 2017 08:00), Max: 98.9 (19 Aug 2017 04:00)  HR: 84 (19 Aug 2017 09:00) (75 - 88)  BP: 89/49 (19 Aug 2017 09:00) (89/49 - 141/55)  BP(mean): 58 (19 Aug 2017 09:00) (58 - 81)  RR: 15 (19 Aug 2017 09:00) (13 - 22)  SpO2: 96% (19 Aug 2017 09:00) (95% - 100%)    I&O's Detail    18 Aug 2017 07:01  -  19 Aug 2017 07:00  --------------------------------------------------------  IN:    dextrose 5% + sodium chloride 0.45%.: 1150 mL    Enteral Tube Flush: 120 mL    Enteral Tube Flush: 120 mL    IV PiggyBack: 250 mL    Jevity: 780 mL  Total IN: 2420 mL    OUT:    Bulb: 90 mL    Chest Tube: 70 mL    Indwelling Catheter - Urethral: 1930 mL    Nasoenteral Tube: 665 mL  Total OUT: 2755 mL    Total NET: -335 mL      19 Aug 2017 07:01  -  19 Aug 2017 09:37  --------------------------------------------------------  IN:    dextrose 5% + sodium chloride 0.45%.: 100 mL    Enteral Tube Flush: 20 mL    Enteral Tube Flush: 20 mL    Jevity: 40 mL  Total IN: 180 mL    OUT:    Bulb: 30 mL  Total OUT: 30 mL    Total NET: 150 mL          Daily     Daily Weight in k.6 (19 Aug 2017 06:00)    LABS:                        9.2    10.27 )-----------( 192      ( 19 Aug 2017 02:25 )             27.9     08-19    139  |  102  |  14  ----------------------------<  115<H>  3.9   |  28  |  0.78    Ca    8.9      19 Aug 2017 02:25  Phos  2.8     08-19  Mg     1.9     19            RADIOLOGY & ADDITIONAL STUDIES:

## 2017-08-19 NOTE — PROGRESS NOTE ADULT - SUBJECTIVE AND OBJECTIVE BOX
MEJIA LATIF          [id number]    HPI:  82 yr old male with hx of cancer of esophagus s/p chemo and radiation therapy presents for preop evaluation.  Pt is now scheduled for Esophagogastroduodenoscopy, Thoracoscopic Mobilization of Stomach, Robotic Assisted Laparoscopic Esophagogastrectomy Feeding Jejunostomy, Tube, Possible Open on 07/14/17.  Of note upon physical exam for medical/cardiac clearance pt was sent for coronary ct valenzuela and was noted have blockage, pt then had angiogram and bare metal stents x 3 placed June 30, 2017. (11 Aug 2017 09:35)      Procedure:  POD # :     Issues:        Interval/Overnight Events/ ROS  Pt remained hemodynamically stable overnight, not on any pressors or inotropes. OOB to chair, breathing comfortably with minimal pain. Ambulated several times . Denies pain, no SOB, no palpitations, no nausea/ no vomiting, no dizziness  A-line and garza d/dylan         PAST MEDICAL & SURGICAL HISTORY:  Coronary artery disease: s/p 3 stents on June 30, 2017 at [place] Dr. Lavelle Reid  OA (osteoarthritis) of knee: right  Former smoker, stopped smoking in distant past  Former smoker  Rheumatoid arthritis  Seizure disorder: 1 episode approximately 15 yrs ago  Asthma  Hyperlipidemia  BPH (benign prostatic hyperplasia)  HTN (hypertension)  Esophagus cancer  Chronic allergic rhinitis  BCC (basal cell carcinoma): skin  Cerebrovascular accident (CVA)  Anxiety  History of tonsillectomy  H/O heart artery stent: June 30, 2017  Knee arthropathy: left  History of total hip replacement: left  History of hernia repair    Allergies    penicillin (Rash)    Intolerances            ***VITAL SIGNS:  Vital Signs Last 24 Hrs  T(C): 36.9 (19 Aug 2017 16:00), Max: 37.2 (19 Aug 2017 04:00)  T(F): 98.5 (19 Aug 2017 16:00), Max: 98.9 (19 Aug 2017 04:00)  HR: 83 (19 Aug 2017 19:00) (75 - 109)  BP: 90/45 (19 Aug 2017 19:00) (87/45 - 141/55)  BP(mean): 56 (19 Aug 2017 19:00) (52 - 76)  RR: 19 (19 Aug 2017 19:00) (13 - 26)  SpO2: 91% (19 Aug 2017 19:00) (69% - 100%)    I/Os:   I&O's Detail    18 Aug 2017 07:01  -  19 Aug 2017 07:00  --------------------------------------------------------  IN:    dextrose 5% + sodium chloride 0.45%.: 1150 mL    Enteral Tube Flush: 120 mL    Enteral Tube Flush: 120 mL    IV PiggyBack: 250 mL    Jevity: 780 mL  Total IN: 2420 mL    OUT:    Bulb: 90 mL    Chest Tube: 70 mL    Indwelling Catheter - Urethral: 1930 mL    Nasoenteral Tube: 665 mL  Total OUT: 2755 mL    Total NET: -335 mL      19 Aug 2017 07:01  -  19 Aug 2017 19:57  --------------------------------------------------------  IN:    dextrose 5% + sodium chloride 0.45%.: 270 mL    dextrose 5% + sodium chloride 0.9% with potassium chloride 20 mEq/L: 120 mL    Enteral Tube Flush: 60 mL    Enteral Tube Flush: 60 mL    IV PiggyBack: 450 mL    Jevity: 480 mL  Total IN: 1440 mL    OUT:    Bulb: 115 mL    Chest Tube: 20 mL    Indwelling Catheter - Urethral: 515 mL    Nasoenteral Tube: 450 mL  Total OUT: 1100 mL    Total NET: 340 mL          CAPILLARY BLOOD GLUCOSE          =======================  MEDICATIONS  ===================  MEDICATIONS  (STANDING):  HYDROmorphone PCA (1 mG/mL) 30 milliLiter(s) PCA Continuous PCA Continuous  heparin  Injectable 5000 Unit(s) SubCutaneous every 8 hours  aspirin Suppository 300 milliGRAM(s) Rectal daily  phenytoin  IVPB 200 milliGRAM(s) IV Intermittent two times a day  dexamethasone  Injectable 1 milliGRAM(s) IV Push daily  pantoprazole  Injectable 40 milliGRAM(s) IV Push daily  chlorhexidine 4% Liquid 1 Application(s) Topical daily  dornase ziyad Solution 2.5 milliGRAM(s) Inhalation daily  dextrose 5% + sodium chloride 0.9% with potassium chloride 20 mEq/L 1000 milliLiter(s) (20 mL/Hr) IV Continuous <Continuous>  ipratropium    for Nebulization 500 MICROGram(s) Nebulizer every 6 hours    MEDICATIONS  (PRN):  HYDROmorphone PCA (1 mG/mL) Rescue Clinician Bolus 0.5 milliGRAM(s) IV Push every 15 minutes PRN for Pain Scale GREATER THAN 6  naloxone Injectable 0.1 milliGRAM(s) IV Push every 3 minutes PRN For ANY of the following changes in patient status:  A. RR LESS THAN 10 breaths per minute, B. Oxygen saturation LESS THAN 90%, C. Sedation score of 6  ondansetron Injectable 4 milliGRAM(s) IV Push every 6 hours PRN Nausea  ALBUTerol    90 MICROgram(s) HFA Inhaler 2 Puff(s) Inhalation every 4 hours PRN Shortness of Breath      ======================VENTILATOR SETTINGS  ==============      =================== PATIENT CARE ACCESS DEVICES ==========  Peripheral IV  Central Venous Line	R	L	IJ	Fem	SC			Placed:   Arterial Line	R	L	PT	DP	Fem	Rad	Ax	Placed:   Midline:				  Urinary Catheter, Date Placed:   Necessity of urinary, arterial, and venous catheters discussed    ======================= PHYSICAL EXAM===================  General:                         Comfortable, Awake, alert, not in any distress  Neuro:                            Moving all extremities to commands. No focal deficits	  HEENT:                           CARLOS/ ETT/ NGT/ trach  Respiratory:	Lungs clear on auscultation bilaterally with good aeration.                                           No rales, rhonchi, no wheezing. Effort even and unlabored.  CV:		Regular rate and rhythm. Normal S1/S2. No murmurs  Abdomen:	                     Soft,  nontender, not-distended. Bowel sounds present / absent.   Skin:		No rash.  Extremities:	Warm, no cyanosis or edema.  Palpable pulses    ============================ LABS =======================                        9.2    10.27 )-----------( 192      ( 19 Aug 2017 02:25 )             27.9     08-19    139  |  102  |  14  ----------------------------<  115<H>  3.9   |  28  |  0.78    Ca    8.9      19 Aug 2017 02:25  Phos  2.8     08-19  Mg     1.9     08-19                ===================== IMAGING STUDIES ===================      ====================ASSESSMENT AND PLAN ================      ====================== NEUROLOGY=======================  Pain control with PCA / PCEA / Tylenol IV / Toradol / Percocet  Pt is on Precedex for agitation  Pt is sedated with Propofol / Fentanyl    ==================== RESPIRATORY========================  Pt is on            L nasal canula / Face tent____% FiO2  Comfortable, no evidence of distress.  Using incentive spirometry & doing                ml  Monitor chest tube output  Chest tube to suction / water seal	    Mechanical Ventilation:    Mechanical ventilator status assessed & settings reviewed  Continue bronchodilators, pulmonary toilet  Head of bed elevation to 30-40 degrees    ====================CARDIOVASCULAR=====================  Continue hemodynamic monitoring/ telemetry  Not on any pressors  Continue cardiovascular / antihypertensive medications    ===================== RENAL ============================  Continue LR 30CC/hr      D/C IVF  Monitor I/Os, BUN/ Cr  and electrolytes  D/C Garza      Keep Garza  BPH: Continue Flomax/ Finasteride      ==================== GASTROINTESTINAL===================  On regular diet, tolerating well  Continue GI prophylaxis with Pepcid / Protonix  Continue Zofran / Reglan for nausea - PRN	  NPO    =======================    ENDOCRIN  =====================  Glycemic monitoring  F/S with coverage  ===================HEMATOLOGIC/ONCOLOGIC =============  Monitor chest tube output. No signs of active bleeding.   Follow CBC, coags  in AM  DVT prophylaxis with SCD, sc Heparin    ========================INFECTIOUS DISEASE===============  No signs of infection. Monitor for fever / leukocytosis.  All surgical incision / chest tube  sites look clean  D/C Garza      Pertinent clinical, laboratory, radiographic, hemodynamic, echocardiographic, respiratory data, microbiologic data and chart were reviewed and analyzed frequently throughout the course of the day and night. GI and DVT prophylaxis, glycemic control, head of bed elevation and skin care issues were addressed.  Patient seen, examined and plan discussed with CT Surgery / CTICU team during rounds.    I have spent               minutes of critical care time with this pt between            am/pm    and               am/ pm      TUCKER Zamorano MD MEJIA LATIF          [id number]    HPI:  82 yr old male with hx of cancer of esophagus s/p chemo and radiation therapy presents for preop evaluation.  Pt is now scheduled for Esophagogastroduodenoscopy, Thoracoscopic Mobilization of Stomach, Robotic Assisted Laparoscopic Esophagogastrectomy Feeding Jejunostomy, Tube, Possible Open on 07/14/17.  Of note upon physical exam for medical/cardiac clearance pt was sent for coronary ct valenzuela and was noted have blockage, pt then had angiogram and bare metal stents x 3 placed June 30, 2017. (11 Aug 2017 09:35)      Procedure: esophagogastrectomy  and J tube placement  for Malignant neoplasm of lower third of esophagus  08/14/2017   POD # : 5    Issues: postop pain              malnutrition        Interval/Overnight Events/ ROS  Pt remained hemodynamically stable overnight, not on any pressors or inotropes. OOB to chair, breathing comfortably with minimal pain. Ambulated several times . Denies pain, no SOB, no palpitations, no nausea/ no vomiting, no dizziness  A-line and garza d/dylan         PAST MEDICAL & SURGICAL HISTORY:  Coronary artery disease: s/p 3 stents on June 30, 2017 at [place] Dr. Lavelle Reid  OA (osteoarthritis) of knee: right  Former smoker, stopped smoking in distant past  Former smoker  Rheumatoid arthritis  Seizure disorder: 1 episode approximately 15 yrs ago  Asthma  Hyperlipidemia  BPH (benign prostatic hyperplasia)  HTN (hypertension)  Esophagus cancer  Chronic allergic rhinitis  BCC (basal cell carcinoma): skin  Cerebrovascular accident (CVA)  Anxiety  History of tonsillectomy  H/O heart artery stent: June 30, 2017  Knee arthropathy: left  History of total hip replacement: left  History of hernia repair    Allergies    penicillin (Rash)    Intolerances            ***VITAL SIGNS:  Vital Signs Last 24 Hrs  T(C): 36.9 (19 Aug 2017 16:00), Max: 37.2 (19 Aug 2017 04:00)  T(F): 98.5 (19 Aug 2017 16:00), Max: 98.9 (19 Aug 2017 04:00)  HR: 83 (19 Aug 2017 19:00) (75 - 109)  BP: 90/45 (19 Aug 2017 19:00) (87/45 - 141/55)  BP(mean): 56 (19 Aug 2017 19:00) (52 - 76)  RR: 19 (19 Aug 2017 19:00) (13 - 26)  SpO2: 91% (19 Aug 2017 19:00) (69% - 100%)    I/Os:   I&O's Detail    18 Aug 2017 07:01  -  19 Aug 2017 07:00  --------------------------------------------------------  IN:    dextrose 5% + sodium chloride 0.45%.: 1150 mL    Enteral Tube Flush: 120 mL    Enteral Tube Flush: 120 mL    IV PiggyBack: 250 mL    Jevity: 780 mL  Total IN: 2420 mL    OUT:    Bulb: 90 mL    Chest Tube: 70 mL    Indwelling Catheter - Urethral: 1930 mL    Nasoenteral Tube: 665 mL  Total OUT: 2755 mL    Total NET: -335 mL      19 Aug 2017 07:01  -  19 Aug 2017 19:57  --------------------------------------------------------  IN:    dextrose 5% + sodium chloride 0.45%.: 270 mL    dextrose 5% + sodium chloride 0.9% with potassium chloride 20 mEq/L: 120 mL    Enteral Tube Flush: 60 mL    Enteral Tube Flush: 60 mL    IV PiggyBack: 450 mL    Jevity: 480 mL  Total IN: 1440 mL    OUT:    Bulb: 115 mL    Chest Tube: 20 mL    Indwelling Catheter - Urethral: 515 mL    Nasoenteral Tube: 450 mL  Total OUT: 1100 mL    Total NET: 340 mL          CAPILLARY BLOOD GLUCOSE          =======================  MEDICATIONS  ===================  MEDICATIONS  (STANDING):  HYDROmorphone PCA (1 mG/mL) 30 milliLiter(s) PCA Continuous PCA Continuous  heparin  Injectable 5000 Unit(s) SubCutaneous every 8 hours  aspirin Suppository 300 milliGRAM(s) Rectal daily  phenytoin  IVPB 200 milliGRAM(s) IV Intermittent two times a day  dexamethasone  Injectable 1 milliGRAM(s) IV Push daily  pantoprazole  Injectable 40 milliGRAM(s) IV Push daily  chlorhexidine 4% Liquid 1 Application(s) Topical daily  dornase ziyad Solution 2.5 milliGRAM(s) Inhalation daily  dextrose 5% + sodium chloride 0.9% with potassium chloride 20 mEq/L 1000 milliLiter(s) (20 mL/Hr) IV Continuous <Continuous>  ipratropium    for Nebulization 500 MICROGram(s) Nebulizer every 6 hours    MEDICATIONS  (PRN):  HYDROmorphone PCA (1 mG/mL) Rescue Clinician Bolus 0.5 milliGRAM(s) IV Push every 15 minutes PRN for Pain Scale GREATER THAN 6  naloxone Injectable 0.1 milliGRAM(s) IV Push every 3 minutes PRN For ANY of the following changes in patient status:  A. RR LESS THAN 10 breaths per minute, B. Oxygen saturation LESS THAN 90%, C. Sedation score of 6  ondansetron Injectable 4 milliGRAM(s) IV Push every 6 hours PRN Nausea  ALBUTerol    90 MICROgram(s) HFA Inhaler 2 Puff(s) Inhalation every 4 hours PRN Shortness of Breath      ======================VENTILATOR SETTINGS  ==============      =================== PATIENT CARE ACCESS DEVICES ==========  Peripheral IV  Central Venous Line	R	L	IJ	Fem	SC			Placed:   Arterial Line	R	L	PT	DP	Fem	Rad	Ax	Placed:   Midline:				  Urinary Catheter, Date Placed:   Necessity of urinary, arterial, and venous catheters discussed    ======================= PHYSICAL EXAM===================  General:                         Comfortable, Awake, alert, not in any distress  Neuro:                            Moving all extremities to commands. No focal deficits	  HEENT:                           CARLOS/ ETT/ NGT/ trach  Respiratory:	Lungs clear on auscultation bilaterally with good aeration.                                           No rales, rhonchi, no wheezing. Effort even and unlabored.  CV:		Regular rate and rhythm. Normal S1/S2. No murmurs  Abdomen:	                     Soft,  nontender, not-distended. Bowel sounds present / absent.   Skin:		No rash.  Extremities:	Warm, no cyanosis or edema.  Palpable pulses    ============================ LABS =======================                        9.2    10.27 )-----------( 192      ( 19 Aug 2017 02:25 )             27.9     08-19    139  |  102  |  14  ----------------------------<  115<H>  3.9   |  28  |  0.78    Ca    8.9      19 Aug 2017 02:25  Phos  2.8     08-19  Mg     1.9     08-19                ===================== IMAGING STUDIES ===================      ====================ASSESSMENT AND PLAN ================      ====================== NEUROLOGY=======================  Pain control with PCA / PCEA / Tylenol IV / Toradol / Percocet  Pt is on Precedex for agitation  Pt is sedated with Propofol / Fentanyl    ==================== RESPIRATORY========================  Pt is on            L nasal canula / Face tent____% FiO2  Comfortable, no evidence of distress.  Using incentive spirometry & doing                ml  Monitor chest tube output  Chest tube to suction / water seal	    Mechanical Ventilation:    Mechanical ventilator status assessed & settings reviewed  Continue bronchodilators, pulmonary toilet  Head of bed elevation to 30-40 degrees    ====================CARDIOVASCULAR=====================  Continue hemodynamic monitoring/ telemetry  Not on any pressors  Continue cardiovascular / antihypertensive medications    ===================== RENAL ============================  Continue LR 30CC/hr      D/C IVF  Monitor I/Os, BUN/ Cr  and electrolytes  D/C Garza      Keep Garza  BPH: Continue Flomax/ Finasteride      ==================== GASTROINTESTINAL===================  On regular diet, tolerating well  Continue GI prophylaxis with Pepcid / Protonix  Continue Zofran / Reglan for nausea - PRN	  NPO    =======================    ENDOCRIN  =====================  Glycemic monitoring  F/S with coverage  ===================HEMATOLOGIC/ONCOLOGIC =============  Monitor chest tube output. No signs of active bleeding.   Follow CBC, coags  in AM  DVT prophylaxis with SCD, sc Heparin    ========================INFECTIOUS DISEASE===============  No signs of infection. Monitor for fever / leukocytosis.  All surgical incision / chest tube  sites look clean  D/C Garza      Pertinent clinical, laboratory, radiographic, hemodynamic, echocardiographic, respiratory data, microbiologic data and chart were reviewed and analyzed frequently throughout the course of the day and night. GI and DVT prophylaxis, glycemic control, head of bed elevation and skin care issues were addressed.  Patient seen, examined and plan discussed with CT Surgery / CTICU team during rounds.    I have spent               minutes of critical care time with this pt between            am/pm    and               am/ pm      TUCKER Zamorano MD MEJIA LATIF          [id number]    HPI:  82 yr old male with hx of cancer of esophagus s/p chemo and radiation therapy presents for preop evaluation.  Pt is now scheduled for Esophagogastroduodenoscopy, Thoracoscopic Mobilization of Stomach, Robotic Assisted Laparoscopic Esophagogastrectomy Feeding Jejunostomy, Tube, Possible Open on 07/14/17.  Of note upon physical exam for medical/cardiac clearance pt was sent for coronary ct valenzuela and was noted have blockage, pt then had angiogram and bare metal stents x 3 placed June 30, 2017. (11 Aug 2017 09:35)      Procedure: esophagogastrectomy  and J tube placement  for Malignant neoplasm of lower third of esophagus  08/14/2017   POD # : 5    Issues: postop pain              malnutrition              Hx: CAD- stents, esophageal cancer, BPH      Interval/Overnight Events/ ROS  Pt remained hemodynamically stable overnight, not on any pressors or inotropes. OOB to chair, breathing comfortably with minimal pain. Ambulated several times , no SOB, no palpitations, no nausea/ no vomiting, no dizziness        PAST MEDICAL & SURGICAL HISTORY:  Coronary artery disease: s/p 3 stents on June 30, 2017 at [place] Dr. Lavelle Reid  OA (osteoarthritis) of knee: right  Former smoker, stopped smoking in distant past  Former smoker  Rheumatoid arthritis  Seizure disorder: 1 episode approximately 15 yrs ago  Asthma  Hyperlipidemia  BPH (benign prostatic hyperplasia)  HTN (hypertension)  Esophagus cancer  Chronic allergic rhinitis  BCC (basal cell carcinoma): skin  Cerebrovascular accident (CVA)  Anxiety  History of tonsillectomy  H/O heart artery stent: June 30, 2017  Knee arthropathy: left  History of total hip replacement: left  History of hernia repair      Allergies  penicillin (Rash)        ***VITAL SIGNS:  Vital Signs Last 24 Hrs  T(C): 36.9 (19 Aug 2017 16:00), Max: 37.2 (19 Aug 2017 04:00)  T(F): 98.5 (19 Aug 2017 16:00), Max: 98.9 (19 Aug 2017 04:00)  HR: 83 (19 Aug 2017 19:00) (75 - 109)  BP: 90/45 (19 Aug 2017 19:00) (87/45 - 141/55)  BP(mean): 56 (19 Aug 2017 19:00) (52 - 76)  RR: 19 (19 Aug 2017 19:00) (13 - 26)  SpO2: 91% (19 Aug 2017 19:00) (69% - 100%)    I/Os:   I&O's Detail    18 Aug 2017 07:01  -  19 Aug 2017 07:00  --------------------------------------------------------  IN:    dextrose 5% + sodium chloride 0.45%.: 1150 mL    Enteral Tube Flush: 120 mL    Enteral Tube Flush: 120 mL    IV PiggyBack: 250 mL    Jevity: 780 mL  Total IN: 2420 mL    OUT:    Bulb: 90 mL    Chest Tube: 70 mL    Indwelling Catheter - Urethral: 1930 mL    Nasoenteral Tube: 665 mL  Total OUT: 2755 mL    Total NET: -335 mL      19 Aug 2017 07:01  -  19 Aug 2017 19:57  --------------------------------------------------------  IN:    dextrose 5% + sodium chloride 0.45%.: 270 mL    dextrose 5% + sodium chloride 0.9% with potassium chloride 20 mEq/L: 120 mL    Enteral Tube Flush: 60 mL    Enteral Tube Flush: 60 mL    IV PiggyBack: 450 mL    Jevity: 480 mL  Total IN: 1440 mL    OUT:    Bulb: 115 mL    Chest Tube: 20 mL    Indwelling Catheter - Urethral: 515 mL    Nasoenteral Tube: 450 mL  Total OUT: 1100 mL    Total NET: 340 mL      =======================  MEDICATIONS  ===================  MEDICATIONS  (STANDING):  HYDROmorphone PCA (1 mG/mL) 30 milliLiter(s) PCA Continuous   heparin  Injectable 5000 Unit(s) SubCutaneous every 8 hours  aspirin Suppository 300 milliGRAM(s) Rectal daily  phenytoin  IVPB 200 milliGRAM(s) IV Intermittent two times a day  dexamethasone  Injectable 1 milliGRAM(s) IV Push daily  pantoprazole  Injectable 40 milliGRAM(s) IV Push daily  chlorhexidine 4% Liquid 1 Application(s) Topical daily  dornase ziyad Solution 2.5 milliGRAM(s) Inhalation daily  dextrose 5% + sodium chloride 0.9% with potassium chloride 20 mEq/L 1000 milliLiter(s) (20 mL/Hr) IV Continuous <Continuous>  ipratropium    for Nebulization 500 MICROGram(s) Nebulizer every 6 hours    MEDICATIONS  (PRN):  HYDROmorphone PCA (1 mG/mL) Rescue Clinician Bolus 0.5 milliGRAM(s) IV Push every 15 minutes PRN for Pain Scale GREATER THAN 6  naloxone Injectable 0.1 milliGRAM(s) IV Push every 3 minutes PRN For ANY of the following changes in patient status:  A. RR LESS THAN 10 breaths per minute, B. Oxygen saturation LESS THAN 90%, C. Sedation score of 6  ondansetron Injectable 4 milliGRAM(s) IV Push every 6 hours PRN Nausea  ALBUTerol    90 MICROgram(s) HFA Inhaler 2 Puff(s) Inhalation every 4 hours PRN Shortness of Breath        =================== PATIENT CARE ACCESS DEVICES ==========  Peripheral IV (+) 			  Urinary Catheter, Date Placed:   Necessity of urinary, venous catheters discussed    ======================= PHYSICAL EXAM===================  General:             Comfortable, Awake, alert, not in any distress  Neuro:               Moving all extremities to commands. No focal deficits	  HEENT:            CARLOS/ NGT (+)  Respiratory:	Lungs on auscultation bilaterally with coarse breath sounds                            No rales,(+)  rhonchi, no wheezing. Effort even and unlabored.                          Right chest tube and gregg sites - clean; minimal  thoracic subcutaneus  emphysema  CV:		Regular rate and rhythm.(+)  S1/S2. No murmurs  Abdomen:          Soft,  nontender, not-distended. Bowel sounds present ; J tube site - clean   Skin:		No rash.  Extremities:	Warm, no cyanosis or edema.  Palpable pulses    ============================ LABS =======================                        9.2    10.27 )-----------( 192      ( 19 Aug 2017 02:25 )             27.9     08-19    139  |  102  |  14  ----------------------------<  115<H>  3.9   |  28  |  0.78    Ca    8.9      19 Aug 2017 02:25  Phos  2.8     08-19  Mg     1.9     08-19        ===================== IMAGING STUDIES ===================  CXR  Aug 19 2017    CLINICAL INFORMATION: Esophageal cancer post esophagectomy.  INTERPRETATION:    Tubes and lines are unchanged. Improved aeration of the lungs with small   residual left effusion. Subcutaneous emphysema  in both sides of the neck   and chest wall unchanged. No pneumothorax appreciated.        ====================ASSESSMENT AND PLAN ================  82 y. M s/p  esophagogastrectomy  and J tube placement  for Malignant neoplasm   of lower third of esophagus  08/14/2017     Issues: postop pain              malnutrition              Hx: CAD- stents, esophageal cancer, BPH      ====================== NEUROLOGY=======================  Pain control with PCA /Tylenol IV    ==================== RESPIRATORY========================  Pt is on       2     L nasal canula   Comfortable, no evidence of distress.  Using incentive spirometry & doing  ~500-750  ml  Monitor  right Gregg t tube output ( to bulb suction)   Right Chest tube removed today	  Continue bronchodilators, pulmonary toilet  Head of bed elevation to 30-40 degrees  OOB, ambulation    ====================CARDIOVASCULAR=====================  Continue hemodynamic monitoring/ telemetry  Not on any pressors    ===================== RENAL ============================  Continue LR 30CC/hr      D/C IVF  Monitor I/Os, BUN/ Cr  and electrolytes  D/C López      Keep López  BPH: Continue Flomax/ Finasteride      ==================== GASTROINTESTINAL===================  On regular diet, tolerating well  Continue GI prophylaxis with Pepcid / Protonix  Continue Zofran / Reglan for nausea - PRN	  NPO    =======================    ENDOCRIN  =====================  Glycemic monitoring  F/S with coverage  ===================HEMATOLOGIC/ONCOLOGIC =============  Monitor chest tube output. No signs of active bleeding.   Follow CBC, coags  in AM  DVT prophylaxis with SCD, sc Heparin    ========================INFECTIOUS DISEASE===============  No signs of infection. Monitor for fever / leukocytosis.  All surgical incision / chest tube  sites look clean  D/C López      Pertinent clinical, laboratory, radiographic, hemodynamic, echocardiographic, respiratory data, microbiologic data and chart were reviewed and analyzed frequently throughout the course of the day and night. GI and DVT prophylaxis, glycemic control, head of bed elevation and skin care issues were addressed.  Patient seen, examined and plan discussed with CT Surgery / CTICU team during rounds.    I have spent               minutes of critical care time with this pt between            am/pm    and               am/ pm      TUCKER Zamorano MD MEJIA LATIF          MRN-[id number]    HPI:  82 yr old male with hx of cancer of esophagus s/p chemo and radiation therapy presents for preop evaluation.  Pt is now scheduled for Esophagogastroduodenoscopy, Thoracoscopic Mobilization of Stomach, Robotic Assisted Laparoscopic Esophagogastrectomy Feeding Jejunostomy, Tube, Possible Open on 07/14/17.  Of note upon physical exam for medical/cardiac clearance pt was sent for coronary ct valenzuela and was noted have blockage, pt then had angiogram and bare metal stents x 3 placed June 30, 2017. (11 Aug 2017 09:35)      Procedure: esophagogastrectomy  and J tube placement  for Malignant neoplasm of lower third of esophagus  08/14/2017   POD # : 5    Issues: postop pain              malnutrition              Hx: CAD- stents, esophageal cancer, BPH      Interval/Overnight Events/ ROS  Pt remained hemodynamically stable overnight, not on any pressors or inotropes. OOB to chair, breathing comfortably with minimal pain. Ambulated several times , no SOB, no palpitations, no nausea/ no vomiting, no dizziness        PAST MEDICAL & SURGICAL HISTORY:  Coronary artery disease: s/p 3 stents on June 30, 2017 at [facility] Dr. Lavelle Reid  OA (osteoarthritis) of knee: right  Former smoker, stopped smoking in distant past  Former smoker  Rheumatoid arthritis  Seizure disorder: 1 episode approximately 15 yrs ago  Asthma  Hyperlipidemia  BPH (benign prostatic hyperplasia)  HTN (hypertension)  Esophagus cancer  Chronic allergic rhinitis  BCC (basal cell carcinoma): skin  Cerebrovascular accident (CVA)  Anxiety  History of tonsillectomy  H/O heart artery stent: June 30, 2017  Knee arthropathy: left  History of total hip replacement: left  History of hernia repair      Allergies  penicillin (Rash)    Home Medications:   * Patient Currently Takes Medications as of 11-Aug-2017 10:25 documented in Prescription Writer  · 	aspirin 81 mg oral tablet: Last Dose Taken:  , 1 tab(s) orally once a day  last  8/11 dose   · 	ProAir HFA 90 mcg/inh inhalation aerosol: 2 puff(s) inhaled 4 times a day, As Needed   · 	Vitamin B-12 500 mcg oral tablet: 1 tab(s) orally once a day  · 	amLODIPine 5 mg oral tablet: Last Dose Taken:  , 1 tab(s) orally once a day in pm  · 	simvastatin 40 mg oral tablet: Last Dose Taken:  , 1 tab(s) orally once a day in pm  · 	tamsulosin 0.4 mg oral capsule: Last Dose Taken:  , 1 cap(s) orally once a day in pm  · 	Zetia 10 mg oral tablet: Last Dose Taken:  , 1 tab(s) orally once a day in pm  · 	ALPRAZolam 0.25 mg oral tablet: Last Dose Taken:  , 2 tab(s) orally 2 times a day  · 	olmesartan 20 mg oral tablet: Last Dose Taken:  , 1 tab(s) orally once a day in pm  · 	montelukast 10 mg oral tablet: Last Dose Taken:  , 1 tab(s) orally once a day in pm  · 	Dilantin 100 mg oral capsule: Last Dose Taken:  , 2 cap(s) orally 2 times a day  · 	predniSONE 5 mg oral tablet: Last Dose Taken:  , 1 tab(s) orally once a day in am  · 	finasteride 5 mg oral tablet: Last Dose Taken:  , 1 tab(s) orally once a day in pm  · 	Centrum Silver oral tablet: Last Dose Taken:  , 1 tab(s) orally once a day  last dose 8/11  · 	Plavix 75 mg oral tablet: Last Dose Taken:  , 1 tab(s) orally once a day  last dose 8/10  · 	folic acid 0.4 mg oral tablet: 1 tab(s) orally once a day  · 	Vitamin B6 100 mg oral tablet: 1 tab(s) orally once a day      ***VITAL SIGNS:  Vital Signs Last 24 Hrs  T(C): 36.9 (19 Aug 2017 16:00), Max: 37.2 (19 Aug 2017 04:00)  T(F): 98.5 (19 Aug 2017 16:00), Max: 98.9 (19 Aug 2017 04:00)  HR: 83 (19 Aug 2017 19:00) (75 - 109)  BP: 90/45 (19 Aug 2017 19:00) (87/45 - 141/55)  BP(mean): 56 (19 Aug 2017 19:00) (52 - 76)  RR: 19 (19 Aug 2017 19:00) (13 - 26)  SpO2: 91% (19 Aug 2017 19:00) (69% - 100%)    I/Os:   I&O's Detail    18 Aug 2017 07:01  -  19 Aug 2017 07:00  --------------------------------------------------------  IN:    dextrose 5% + sodium chloride 0.45%.: 1150 mL    Enteral Tube Flush: 120 mL    Enteral Tube Flush: 120 mL    IV PiggyBack: 250 mL    Jevity: 780 mL  Total IN: 2420 mL    OUT:    Bulb: 90 mL    Chest Tube: 70 mL    Indwelling Catheter - Urethral: 1930 mL    Nasoenteral Tube: 665 mL  Total OUT: 2755 mL    Total NET: -335 mL      19 Aug 2017 07:01  -  19 Aug 2017 19:57  --------------------------------------------------------  IN:    dextrose 5% + sodium chloride 0.45%.: 270 mL    dextrose 5% + sodium chloride 0.9% with potassium chloride 20 mEq/L: 120 mL    Enteral Tube Flush: 60 mL    Enteral Tube Flush: 60 mL    IV PiggyBack: 450 mL    Jevity: 480 mL  Total IN: 1440 mL    OUT:    Bulb: 115 mL    Chest Tube: 20 mL    Indwelling Catheter - Urethral: 515 mL    Nasoenteral Tube: 450 mL  Total OUT: 1100 mL    Total NET: 340 mL      =======================  MEDICATIONS  ===================  MEDICATIONS  (STANDING):  HYDROmorphone PCA (1 mG/mL) 30 milliLiter(s) PCA Continuous   heparin  Injectable 5000 Unit(s) SubCutaneous every 8 hours  aspirin Suppository 300 milliGRAM(s) Rectal daily  phenytoin  IVPB 200 milliGRAM(s) IV Intermittent two times a day  dexamethasone  Injectable 1 milliGRAM(s) IV Push daily  pantoprazole  Injectable 40 milliGRAM(s) IV Push daily  chlorhexidine 4% Liquid 1 Application(s) Topical daily  dornase ziyad Solution 2.5 milliGRAM(s) Inhalation daily  dextrose 5% + sodium chloride 0.9% with potassium chloride 20 mEq/L 1000 milliLiter(s) (20 mL/Hr) IV Continuous <Continuous>  ipratropium    for Nebulization 500 MICROGram(s) Nebulizer every 6 hours    MEDICATIONS  (PRN):  HYDROmorphone PCA (1 mG/mL) Rescue Clinician Bolus 0.5 milliGRAM(s) IV Push every 15 minutes PRN for Pain Scale GREATER THAN 6  naloxone Injectable 0.1 milliGRAM(s) IV Push every 3 minutes PRN For ANY of the following changes in patient status:  A. RR LESS THAN 10 breaths per minute, B. Oxygen saturation LESS THAN 90%, C. Sedation score of 6  ondansetron Injectable 4 milliGRAM(s) IV Push every 6 hours PRN Nausea  ALBUTerol    90 MICROgram(s) HFA Inhaler 2 Puff(s) Inhalation every 4 hours PRN Shortness of Breath        =================== PATIENT CARE ACCESS DEVICES ==========  Peripheral IV (+) 			  Urinary Catheter, Date Placed:   Necessity of urinary, venous catheters discussed    ======================= PHYSICAL EXAM===================  General:             Comfortable, Awake, alert, not in any distress  Neuro:               Moving all extremities to commands. No focal deficits	  HEENT:            CARLOS/ NGT (+)  Respiratory:	Lungs on auscultation bilaterally with coarse breath sounds                            No rales,(+)  rhonchi, no wheezing. Effort even and unlabored.                          Right chest tube and gregg sites - clean; minimal  thoracic subcutaneus  emphysema  CV:		Regular rate and rhythm.(+)  S1/S2. No murmurs  Abdomen:          Soft,  nontender, not-distended. Bowel sounds present ; J tube site - clean   Skin:		No rash.  Extremities:	Warm, no cyanosis or edema.  Palpable pulses    ============================ LABS =======================                        9.2    10.27 )-----------( 192      ( 19 Aug 2017 02:25 )             27.9     08-19    139  |  102  |  14  ----------------------------<  115<H>  3.9   |  28  |  0.78    Ca    8.9      19 Aug 2017 02:25  Phos  2.8     08-19  Mg     1.9     08-19        ===================== IMAGING STUDIES ===================  CXR  Aug 19 2017    CLINICAL INFORMATION: Esophageal cancer post esophagectomy.  INTERPRETATION:    Tubes and lines are unchanged. Improved aeration of the lungs with small   residual left effusion. Subcutaneous emphysema  in both sides of the neck   and chest wall unchanged. No pneumothorax appreciated.        ====================ASSESSMENT AND PLAN ================  82 y. M s/p  esophagogastrectomy  and J tube placement  for Malignant neoplasm   of lower third of esophagus  08/14/2017     Issues: postop pain              malnutrition              borderline hypotension - on gentle hydration              Hx: CAD- stents, esophageal cancer, BPH      ====================== NEUROLOGY=======================  Pain control with PCA /Tylenol IV    ==================== RESPIRATORY========================  Pt is on       2     L nasal canula   Comfortable, no evidence of distress.  Using incentive spirometry & doing  ~500-750  ml  Monitor  right Gregg  tube output ( to bulb suction)   Right Chest tube removed today	  Continue bronchodilators, pulmonary toilet  Head of bed elevation to 30-40 degrees  OOB, ambulation    ====================CARDIOVASCULAR=====================  Continue hemodynamic monitoring/ telemetry  ASA - suppository daily for CAD/ stents    ===================== RENAL ============================  Continue LR 30CC/hr      Monitor I/Os, BUN/ Cr  and electrolytes   Keep López for UO monitoring  BPH: OFF  Flomax/ Finasteride while still NPO post surgery      ==================== GASTROINTESTINAL===================  On J tube Jevity 40 cc / h diet, tolerating well ( advancing to 50 cc/h - goal)  Continue GI prophylaxis with Protonix IV  IV  Zofran / Reglan for nausea - PRN	  NPO  NGT to continuous suction  Barium swallow on Monday 8/21/17    =======================    ENDOCRIN  =====================  Glycemic monitoring  F/S with coverage    ===================HEMATOLOGIC/ONCOLOGIC =============  No signs of active bleeding.   Follow CBC, coags  in AM  DVT prophylaxis with SCD, sc Heparin    ========================INFECTIOUS DISEASE===============  No signs of infection. Monitor for fever / leukocytosis.  All surgical incision / chest tube  sites look clean        Pertinent clinical, laboratory, radiographic, hemodynamic, echocardiographic, respiratory data, microbiologic data and chart were reviewed and analyzed frequently throughout the course of the day and night. GI and DVT prophylaxis, glycemic control, head of bed elevation and skin care issues were addressed.  Patient seen, examined and plan discussed with CT Surgery / CTICU team during rounds.    I have spent    50  minutes of critical care time with this pt between    8   am  and 11:55pm      TUCKER Zamorano MD MEJIA LATIF          MRN-[id number]    HPI:  82 yr old male with hx of cancer of esophagus s/p chemo and radiation therapy presents for preop evaluation.  Pt is now scheduled for Esophagogastroduodenoscopy, Thoracoscopic Mobilization of Stomach, Robotic Assisted Laparoscopic Esophagogastrectomy Feeding Jejunostomy, Tube, Possible Open on 07/14/17.  Of note upon physical exam for medical/cardiac clearance pt was sent for coronary ct valenzuela and was noted have blockage, pt then had angiogram and bare metal stents x 3 placed June 30, 2017.      Procedure: esophagogastrectomy  and J tube placement  for Malignant neoplasm of lower third of esophagus  08/14/2017   POD # : 5    Issues: postop pain              malnutrition              Hx: CAD- stents, esophageal cancer, BPH      Interval/Overnight Events/ ROS  Pt remained hemodynamically stable overnight, not on any pressors or inotropes. OOB to chair, breathing comfortably with minimal pain. Ambulated several times , no SOB, no palpitations, no nausea/ no vomiting, no dizziness        PAST MEDICAL & SURGICAL HISTORY:  Coronary artery disease: s/p 3 stents on June 30, 2017 at [place] [facility] Dr. Lavelle Reid  OA (osteoarthritis) of knee: right  Former smoker, stopped smoking in distant past  Former smoker  Rheumatoid arthritis  Seizure disorder: 1 episode approximately 15 yrs ago  Asthma  Hyperlipidemia  BPH (benign prostatic hyperplasia)  HTN (hypertension)  Esophagus cancer  Chronic allergic rhinitis  BCC (basal cell carcinoma): skin  Cerebrovascular accident (CVA)  Anxiety  History of tonsillectomy  H/O heart artery stent: June 30, 2017  Knee arthropathy: left  History of total hip replacement: left  History of hernia repair      Allergies  penicillin (Rash)    Home Medications:   * Patient Currently Takes Medications as of 11-Aug-2017 10:25 documented in Prescription Writer  · 	aspirin 81 mg oral tablet: Last Dose Taken:  , 1 tab(s) orally once a day  last  8/11 dose   · 	ProAir HFA 90 mcg/inh inhalation aerosol: 2 puff(s) inhaled 4 times a day, As Needed   · 	Vitamin B-12 500 mcg oral tablet: 1 tab(s) orally once a day  · 	amLODIPine 5 mg oral tablet: Last Dose Taken:  , 1 tab(s) orally once a day in pm  · 	simvastatin 40 mg oral tablet: Last Dose Taken:  , 1 tab(s) orally once a day in pm  · 	tamsulosin 0.4 mg oral capsule: Last Dose Taken:  , 1 cap(s) orally once a day in pm  · 	Zetia 10 mg oral tablet: Last Dose Taken:  , 1 tab(s) orally once a day in pm  · 	ALPRAZolam 0.25 mg oral tablet: Last Dose Taken:  , 2 tab(s) orally 2 times a day  · 	olmesartan 20 mg oral tablet: Last Dose Taken:  , 1 tab(s) orally once a day in pm  · 	montelukast 10 mg oral tablet: Last Dose Taken:  , 1 tab(s) orally once a day in pm  · 	Dilantin 100 mg oral capsule: Last Dose Taken:  , 2 cap(s) orally 2 times a day  · 	predniSONE 5 mg oral tablet: Last Dose Taken:  , 1 tab(s) orally once a day in am  · 	finasteride 5 mg oral tablet: Last Dose Taken:  , 1 tab(s) orally once a day in pm  · 	Centrum Silver oral tablet: Last Dose Taken:  , 1 tab(s) orally once a day  last dose 8/11  · 	Plavix 75 mg oral tablet: Last Dose Taken:  , 1 tab(s) orally once a day  last dose 8/10  · 	folic acid 0.4 mg oral tablet: 1 tab(s) orally once a day  · 	Vitamin B6 100 mg oral tablet: 1 tab(s) orally once a day      ***VITAL SIGNS:  Vital Signs Last 24 Hrs  T(C): 36.9 (19 Aug 2017 16:00), Max: 37.2 (19 Aug 2017 04:00)  T(F): 98.5 (19 Aug 2017 16:00), Max: 98.9 (19 Aug 2017 04:00)  HR: 83 (19 Aug 2017 19:00) (75 - 109)  BP: 90/45 (19 Aug 2017 19:00) (87/45 - 141/55)  BP(mean): 56 (19 Aug 2017 19:00) (52 - 76)  RR: 19 (19 Aug 2017 19:00) (13 - 26)  SpO2: 91% (19 Aug 2017 19:00) (69% - 100%)    I/Os:   I&O's Detail    18 Aug 2017 07:01  -  19 Aug 2017 07:00  --------------------------------------------------------  IN:    dextrose 5% + sodium chloride 0.45%.: 1150 mL    Enteral Tube Flush: 120 mL    Enteral Tube Flush: 120 mL    IV PiggyBack: 250 mL    Jevity: 780 mL  Total IN: 2420 mL    OUT:    Bulb: 90 mL    Chest Tube: 70 mL    Indwelling Catheter - Urethral: 1930 mL    Nasoenteral Tube: 665 mL  Total OUT: 2755 mL    Total NET: -335 mL      19 Aug 2017 07:01  -  19 Aug 2017 19:57  --------------------------------------------------------  IN:    dextrose 5% + sodium chloride 0.45%.: 270 mL    dextrose 5% + sodium chloride 0.9% with potassium chloride 20 mEq/L: 120 mL    Enteral Tube Flush: 60 mL    Enteral Tube Flush: 60 mL    IV PiggyBack: 450 mL    Jevity: 480 mL  Total IN: 1440 mL    OUT:    Bulb: 115 mL    Chest Tube: 20 mL    Indwelling Catheter - Urethral: 515 mL    Nasoenteral Tube: 450 mL  Total OUT: 1100 mL    Total NET: 340 mL      =======================  MEDICATIONS  ===================  MEDICATIONS  (STANDING):  HYDROmorphone PCA (1 mG/mL) 30 milliLiter(s) PCA Continuous   heparin  Injectable 5000 Unit(s) SubCutaneous every 8 hours  aspirin Suppository 300 milliGRAM(s) Rectal daily  phenytoin  IVPB 200 milliGRAM(s) IV Intermittent two times a day  dexamethasone  Injectable 1 milliGRAM(s) IV Push daily  pantoprazole  Injectable 40 milliGRAM(s) IV Push daily  chlorhexidine 4% Liquid 1 Application(s) Topical daily  dornase ziyad Solution 2.5 milliGRAM(s) Inhalation daily  dextrose 5% + sodium chloride 0.9% with potassium chloride 20 mEq/L 1000 milliLiter(s) (20 mL/Hr) IV Continuous <Continuous>  ipratropium    for Nebulization 500 MICROGram(s) Nebulizer every 6 hours    MEDICATIONS  (PRN):  HYDROmorphone PCA (1 mG/mL) Rescue Clinician Bolus 0.5 milliGRAM(s) IV Push every 15 minutes PRN for Pain Scale GREATER THAN 6  naloxone Injectable 0.1 milliGRAM(s) IV Push every 3 minutes PRN For ANY of the following changes in patient status:  A. RR LESS THAN 10 breaths per minute, B. Oxygen saturation LESS THAN 90%, C. Sedation score of 6  ondansetron Injectable 4 milliGRAM(s) IV Push every 6 hours PRN Nausea  ALBUTerol    90 MICROgram(s) HFA Inhaler 2 Puff(s) Inhalation every 4 hours PRN Shortness of Breath        =================== PATIENT CARE ACCESS DEVICES ==========  Peripheral IV (+) 			  Urinary Catheter, Date Placed:   Necessity of urinary, venous catheters discussed    ======================= PHYSICAL EXAM===================  General:             Comfortable, Awake, alert, not in any distress  Neuro:               Moving all extremities to commands. No focal deficits	  HEENT:            CARLOS/ NGT (+)  Respiratory:	Lungs on auscultation bilaterally with coarse breath sounds                            No rales,(+)  rhonchi, no wheezing. Effort even and unlabored.                          Right chest tube and gregg sites - clean; minimal  thoracic subcutaneus  emphysema  CV:		Regular rate and rhythm.(+)  S1/S2. No murmurs  Abdomen:          Soft,  nontender, not-distended. Bowel sounds present ; J tube site - clean   Skin:		No rash.  Extremities:	Warm, no cyanosis or edema.  Palpable pulses    ============================ LABS =======================                        9.2    10.27 )-----------( 192      ( 19 Aug 2017 02:25 )             27.9     08-19    139  |  102  |  14  ----------------------------<  115<H>  3.9   |  28  |  0.78    Ca    8.9      19 Aug 2017 02:25  Phos  2.8     08-19  Mg     1.9     08-19        ===================== IMAGING STUDIES ===================  CXR  Aug 19 2017    CLINICAL INFORMATION: Esophageal cancer post esophagectomy.  INTERPRETATION:    Tubes and lines are unchanged. Improved aeration of the lungs with small   residual left effusion. Subcutaneous emphysema  in both sides of the neck   and chest wall unchanged. No pneumothorax appreciated.        ====================ASSESSMENT AND PLAN ================  82 y. M s/p  esophagogastrectomy  and J tube placement  for Malignant neoplasm   of lower third of esophagus  08/14/2017     Issues: postop pain              malnutrition              borderline hypotension - on gentle hydration              Hx: CAD- stents, esophageal cancer, BPH      ====================== NEUROLOGY=======================  Pain control with PCA /Tylenol IV    ==================== RESPIRATORY========================  Pt is on       2     L nasal canula   Comfortable, no evidence of distress.  Using incentive spirometry & doing  ~500-750  ml  Monitor  right Gregg  tube output ( to bulb suction)   Right Chest tube removed today	  Continue bronchodilators, pulmonary toilet  Head of bed elevation to 30-40 degrees  OOB, ambulation    ====================CARDIOVASCULAR=====================  Continue hemodynamic monitoring/ telemetry  ASA - suppository daily for CAD/ stents    ===================== RENAL ============================  Continue LR 30CC/hr      Monitor I/Os, BUN/ Cr  and electrolytes   Keep López for UO monitoring  BPH: OFF  Flomax/ Finasteride while still NPO post surgery      ==================== GASTROINTESTINAL===================  On J tube Jevity 40 cc / h diet, tolerating well ( advancing to 50 cc/h - goal)  Continue GI prophylaxis with Protonix IV  IV  Zofran / Reglan for nausea - PRN	  NPO  NGT to continuous suction  Barium swallow on Monday 8/21/17    =======================    ENDOCRIN  =====================  Glycemic monitoring  F/S with coverage    ===================HEMATOLOGIC/ONCOLOGIC =============  No signs of active bleeding.   Follow CBC, coags  in AM  DVT prophylaxis with SCD, sc Heparin    ========================INFECTIOUS DISEASE===============  No signs of infection. Monitor for fever / leukocytosis.  All surgical incision / chest tube  sites look clean        Pertinent clinical, laboratory, radiographic, hemodynamic, echocardiographic, respiratory data, microbiologic data and chart were reviewed and analyzed frequently throughout the course of the day and night. GI and DVT prophylaxis, glycemic control, head of bed elevation and skin care issues were addressed.  Patient seen, examined and plan discussed with CT Surgery / CTICU team during rounds.    I have spent    50  minutes of critical care time with this pt between    8   am  and 11:55pm      TUCKER Zamorano MD MEJIA LATIF          MRN-[id number]    HPI:  82 yr old male with hx of cancer of esophagus s/p chemo and radiation therapy presents for preop evaluation.  Pt is now scheduled for Esophagogastroduodenoscopy, Thoracoscopic Mobilization of Stomach, Robotic Assisted Laparoscopic Esophagogastrectomy Feeding Jejunostomy, Tube, Possible Open on 07/14/17.  Of note upon physical exam for medical/cardiac clearance pt was sent for coronary ct valenzuela and was noted have blockage, pt then had angiogram and bare metal stents x 3 placed June 30, 2017.      Procedure: esophagogastrectomy  and J tube placement  for Malignant neoplasm of lower third of esophagus  08/14/2017   POD # : 5    Issues: postop pain              malnutrition              Hx: CAD- stents, esophageal cancer, BPH      Interval/Overnight Events/ ROS  Pt remained hemodynamically stable overnight, not on any pressors or inotropes. OOB to chair, breathing comfortably with minimal pain. Ambulated several times , no SOB, no palpitations, no nausea/ no vomiting, no dizziness        PAST MEDICAL & SURGICAL HISTORY:  Coronary artery disease: s/p 3 stents on June 30, 2017 at [place] Dr. Lavelle Reid  OA (osteoarthritis) of knee: right  Former smoker, stopped smoking in distant past  Former smoker  Rheumatoid arthritis  Seizure disorder: 1 episode approximately 15 yrs ago  Asthma  Hyperlipidemia  BPH (benign prostatic hyperplasia)  HTN (hypertension)  Esophagus cancer  Chronic allergic rhinitis  BCC (basal cell carcinoma): skin  Cerebrovascular accident (CVA)  Anxiety  History of tonsillectomy  H/O heart artery stent: June 30, 2017  Knee arthropathy: left  History of total hip replacement: left  History of hernia repair      Allergies  penicillin (Rash)    Home Medications:   * Patient Currently Takes Medications as of 11-Aug-2017 10:25 documented in Prescription Writer  · 	aspirin 81 mg oral tablet: Last Dose Taken:  , 1 tab(s) orally once a day  last  8/11 dose   · 	ProAir HFA 90 mcg/inh inhalation aerosol: 2 puff(s) inhaled 4 times a day, As Needed   · 	Vitamin B-12 500 mcg oral tablet: 1 tab(s) orally once a day  · 	amLODIPine 5 mg oral tablet: Last Dose Taken:  , 1 tab(s) orally once a day in pm  · 	simvastatin 40 mg oral tablet: Last Dose Taken:  , 1 tab(s) orally once a day in pm  · 	tamsulosin 0.4 mg oral capsule: Last Dose Taken:  , 1 cap(s) orally once a day in pm  · 	Zetia 10 mg oral tablet: Last Dose Taken:  , 1 tab(s) orally once a day in pm  · 	ALPRAZolam 0.25 mg oral tablet: Last Dose Taken:  , 2 tab(s) orally 2 times a day  · 	olmesartan 20 mg oral tablet: Last Dose Taken:  , 1 tab(s) orally once a day in pm  · 	montelukast 10 mg oral tablet: Last Dose Taken:  , 1 tab(s) orally once a day in pm  · 	Dilantin 100 mg oral capsule: Last Dose Taken:  , 2 cap(s) orally 2 times a day  · 	predniSONE 5 mg oral tablet: Last Dose Taken:  , 1 tab(s) orally once a day in am  · 	finasteride 5 mg oral tablet: Last Dose Taken:  , 1 tab(s) orally once a day in pm  · 	Centrum Silver oral tablet: Last Dose Taken:  , 1 tab(s) orally once a day  last dose 8/11  · 	Plavix 75 mg oral tablet: Last Dose Taken:  , 1 tab(s) orally once a day  last dose 8/10  · 	folic acid 0.4 mg oral tablet: 1 tab(s) orally once a day  · 	Vitamin B6 100 mg oral tablet: 1 tab(s) orally once a day      ***VITAL SIGNS:  Vital Signs Last 24 Hrs  T(C): 36.9 (19 Aug 2017 16:00), Max: 37.2 (19 Aug 2017 04:00)  T(F): 98.5 (19 Aug 2017 16:00), Max: 98.9 (19 Aug 2017 04:00)  HR: 83 (19 Aug 2017 19:00) (75 - 109)  BP: 90/45 (19 Aug 2017 19:00) (87/45 - 141/55)  BP(mean): 56 (19 Aug 2017 19:00) (52 - 76)  RR: 19 (19 Aug 2017 19:00) (13 - 26)  SpO2: 91% (19 Aug 2017 19:00) (69% - 100%)    I/Os:   I&O's Detail    18 Aug 2017 07:01  -  19 Aug 2017 07:00  --------------------------------------------------------  IN:    dextrose 5% + sodium chloride 0.45%.: 1150 mL    Enteral Tube Flush: 120 mL    Enteral Tube Flush: 120 mL    IV PiggyBack: 250 mL    Jevity: 780 mL  Total IN: 2420 mL    OUT:    Bulb: 90 mL    Chest Tube: 70 mL    Indwelling Catheter - Urethral: 1930 mL    Nasoenteral Tube: 665 mL  Total OUT: 2755 mL    Total NET: -335 mL      19 Aug 2017 07:01  -  19 Aug 2017 19:57  --------------------------------------------------------  IN:    dextrose 5% + sodium chloride 0.45%.: 270 mL    dextrose 5% + sodium chloride 0.9% with potassium chloride 20 mEq/L: 120 mL    Enteral Tube Flush: 60 mL    Enteral Tube Flush: 60 mL    IV PiggyBack: 450 mL    Jevity: 480 mL  Total IN: 1440 mL    OUT:    Bulb: 115 mL    Chest Tube: 20 mL    Indwelling Catheter - Urethral: 515 mL    Nasoenteral Tube: 450 mL  Total OUT: 1100 mL    Total NET: 340 mL      =======================  MEDICATIONS  ===================  MEDICATIONS  (STANDING):  HYDROmorphone PCA (1 mG/mL) 30 milliLiter(s) PCA Continuous   heparin  Injectable 5000 Unit(s) SubCutaneous every 8 hours  aspirin Suppository 300 milliGRAM(s) Rectal daily  phenytoin  IVPB 200 milliGRAM(s) IV Intermittent two times a day  dexamethasone  Injectable 1 milliGRAM(s) IV Push daily  pantoprazole  Injectable 40 milliGRAM(s) IV Push daily  chlorhexidine 4% Liquid 1 Application(s) Topical daily  dornase ziyad Solution 2.5 milliGRAM(s) Inhalation daily  dextrose 5% + sodium chloride 0.9% with potassium chloride 20 mEq/L 1000 milliLiter(s) (20 mL/Hr) IV Continuous <Continuous>  ipratropium    for Nebulization 500 MICROGram(s) Nebulizer every 6 hours    MEDICATIONS  (PRN):  HYDROmorphone PCA (1 mG/mL) Rescue Clinician Bolus 0.5 milliGRAM(s) IV Push every 15 minutes PRN for Pain Scale GREATER THAN 6  naloxone Injectable 0.1 milliGRAM(s) IV Push every 3 minutes PRN For ANY of the following changes in patient status:  A. RR LESS THAN 10 breaths per minute, B. Oxygen saturation LESS THAN 90%, C. Sedation score of 6  ondansetron Injectable 4 milliGRAM(s) IV Push every 6 hours PRN Nausea  ALBUTerol    90 MICROgram(s) HFA Inhaler 2 Puff(s) Inhalation every 4 hours PRN Shortness of Breath        =================== PATIENT CARE ACCESS DEVICES ==========  Peripheral IV (+) 			  Urinary Catheter, Date Placed:   Necessity of urinary, venous catheters discussed    ======================= PHYSICAL EXAM===================  General:             Comfortable, Awake, alert, not in any distress  Neuro:               Moving all extremities to commands. No focal deficits	  HEENT:            CARLOS/ NGT (+)  Respiratory:	Lungs on auscultation bilaterally with coarse breath sounds                            No rales,(+)  rhonchi, no wheezing. Effort even and unlabored.                          Right chest tube and gregg sites - clean; minimal  thoracic subcutaneus  emphysema  CV:		Regular rate and rhythm.(+)  S1/S2. No murmurs  Abdomen:          Soft,  nontender, not-distended. Bowel sounds present ; J tube site - clean   Skin:		No rash.  Extremities:	Warm, no cyanosis or edema.  Palpable pulses    ============================ LABS =======================                        9.2    10.27 )-----------( 192      ( 19 Aug 2017 02:25 )             27.9     08-19    139  |  102  |  14  ----------------------------<  115<H>  3.9   |  28  |  0.78    Ca    8.9      19 Aug 2017 02:25  Phos  2.8     08-19  Mg     1.9     08-19        ===================== IMAGING STUDIES ===================  CXR  Aug 19 2017    CLINICAL INFORMATION: Esophageal cancer post esophagectomy.  INTERPRETATION:    Tubes and lines are unchanged. Improved aeration of the lungs with small   residual left effusion. Subcutaneous emphysema  in both sides of the neck   and chest wall unchanged. No pneumothorax appreciated.        ====================ASSESSMENT AND PLAN ================  82 y. M s/p  esophagogastrectomy  and J tube placement  for Malignant neoplasm   of lower third of esophagus  08/14/2017     Issues: postop pain              malnutrition              borderline hypotension - on gentle hydration              Hx: CAD- stents, esophageal cancer, BPH      ====================== NEUROLOGY=======================  Pain control with PCA /Tylenol IV    ==================== RESPIRATORY========================  Pt is on       2     L nasal canula   Comfortable, no evidence of distress.  Using incentive spirometry & doing  ~500-750  ml  Monitor  right Gregg  tube output ( to bulb suction)   Right Chest tube removed today	  Continue bronchodilators, pulmonary toilet  Head of bed elevation to 30-40 degrees  OOB, ambulation    ====================CARDIOVASCULAR=====================  Continue hemodynamic monitoring/ telemetry  ASA - suppository daily for CAD/ stents    ===================== RENAL ============================  Continue LR 30CC/hr      Monitor I/Os, BUN/ Cr  and electrolytes   Keep López for UO monitoring  BPH: OFF  Flomax/ Finasteride while still NPO post surgery      ==================== GASTROINTESTINAL===================  On J tube Jevity 40 cc / h diet, tolerating well ( advancing to 50 cc/h - goal)  Continue GI prophylaxis with Protonix IV  IV  Zofran / Reglan for nausea - PRN	  NPO  NGT to continuous suction  Barium swallow on Monday 8/21/17    =======================    ENDOCRIN  =====================  Glycemic monitoring  F/S with coverage    ===================HEMATOLOGIC/ONCOLOGIC =============  No signs of active bleeding.   Follow CBC, coags  in AM  DVT prophylaxis with SCD, sc Heparin    ========================INFECTIOUS DISEASE===============  No signs of infection. Monitor for fever / leukocytosis.  All surgical incision / chest tube  sites look clean        Pertinent clinical, laboratory, radiographic, hemodynamic, echocardiographic, respiratory data, microbiologic data and chart were reviewed and analyzed frequently throughout the course of the day and night. GI and DVT prophylaxis, glycemic control, head of bed elevation and skin care issues were addressed.  Patient seen, examined and plan discussed with CT Surgery Dr Reynoso  / CTICU team during rounds.    I have spent    50  minutes of critical care time with this pt between    8   am  and 11:55pm      TUCKER Zamorano MD

## 2017-08-19 NOTE — PROGRESS NOTE ADULT - SUBJECTIVE AND OBJECTIVE BOX
Anesthesia Pain Management Service    SUBJECTIVE: Patient is doing well with IV PCA and no significant problems reported.    Pain Scale Score	At rest: ___ 	With Activity: ___ 	[X ] Refer to charted pain scores    THERAPY:    [ ] IV PCA Morphine		[ ] 5 mg/mL	[ ] 1 mg/mL  [X ] IV PCA Hydromorphone	[ ] 5 mg/mL	[X ] 1 mg/mL  [ ] IV PCA Fentanyl		[ ] 50 micrograms/mL    Demand dose __0.2_ lockout __6_ (minutes) Continuous Rate _0__ Total: ___  Daily      MEDICATIONS  (STANDING):  HYDROmorphone PCA (1 mG/mL) 30 milliLiter(s) PCA Continuous PCA Continuous  heparin  Injectable 5000 Unit(s) SubCutaneous every 8 hours  aspirin Suppository 300 milliGRAM(s) Rectal daily  phenytoin  IVPB 200 milliGRAM(s) IV Intermittent two times a day  dexamethasone  Injectable 1 milliGRAM(s) IV Push daily  pantoprazole  Injectable 40 milliGRAM(s) IV Push daily  chlorhexidine 4% Liquid 1 Application(s) Topical daily  ALBUTerol    90 MICROgram(s) HFA Inhaler 2 Puff(s) Inhalation every 6 hours  ipratropium 17 MICROgram(s) HFA Inhaler 1 Puff(s) Inhalation every 6 hours  dextrose 5% + sodium chloride 0.45%. 1000 milliLiter(s) (50 mL/Hr) IV Continuous <Continuous>    MEDICATIONS  (PRN):  HYDROmorphone PCA (1 mG/mL) Rescue Clinician Bolus 0.5 milliGRAM(s) IV Push every 15 minutes PRN for Pain Scale GREATER THAN 6  naloxone Injectable 0.1 milliGRAM(s) IV Push every 3 minutes PRN For ANY of the following changes in patient status:  A. RR LESS THAN 10 breaths per minute, B. Oxygen saturation LESS THAN 90%, C. Sedation score of 6  ondansetron Injectable 4 milliGRAM(s) IV Push every 6 hours PRN Nausea      OBJECTIVE:    Sedation Score:	[ X] Alert	[ ] Drowsy 	[ ] Arousable	[ ] Asleep	[ ] Unresponsive    Side Effects:	[X ] None	[ ] Nausea	[ ] Vomiting	[ ] Pruritus  		[ ] Other:    Vital Signs Last 24 Hrs  T(C): 37 (19 Aug 2017 08:00), Max: 37.2 (19 Aug 2017 04:00)  T(F): 98.6 (19 Aug 2017 08:00), Max: 98.9 (19 Aug 2017 04:00)  HR: 83 (19 Aug 2017 08:00) (75 - 88)  BP: 107/52 (19 Aug 2017 08:00) (103/49 - 141/55)  BP(mean): 63 (19 Aug 2017 08:00) (62 - 81)  RR: 15 (19 Aug 2017 08:00) (13 - 22)  SpO2: 96% (19 Aug 2017 08:00) (95% - 100%)    ASSESSMENT/ PLAN    Therapy to  be:	[ X] Continue   [ ] Discontinued   [ ] Change to prn Analgesics    Documentation and Verification of current medications:   [X] Done	[ ] Not done, not elligible    Comments:

## 2017-08-20 LAB
ALBUMIN SERPL ELPH-MCNC: 2.4 G/DL — LOW (ref 3.3–5)
ALP SERPL-CCNC: 304 U/L — HIGH (ref 40–120)
ALT FLD-CCNC: 55 U/L — HIGH (ref 4–41)
APPEARANCE UR: SIGNIFICANT CHANGE UP
AST SERPL-CCNC: 76 U/L — HIGH (ref 4–40)
BACTERIA # UR AUTO: SIGNIFICANT CHANGE UP
BILIRUB SERPL-MCNC: 1.5 MG/DL — HIGH (ref 0.2–1.2)
BILIRUB UR-MCNC: SIGNIFICANT CHANGE UP
BLOOD UR QL VISUAL: HIGH
BUN SERPL-MCNC: 15 MG/DL — SIGNIFICANT CHANGE UP (ref 7–23)
BUN SERPL-MCNC: 16 MG/DL — SIGNIFICANT CHANGE UP (ref 7–23)
CALCIUM SERPL-MCNC: 8.8 MG/DL — SIGNIFICANT CHANGE UP (ref 8.4–10.5)
CALCIUM SERPL-MCNC: 9.4 MG/DL — SIGNIFICANT CHANGE UP (ref 8.4–10.5)
CHLORIDE SERPL-SCNC: 101 MMOL/L — SIGNIFICANT CHANGE UP (ref 98–107)
CHLORIDE SERPL-SCNC: 99 MMOL/L — SIGNIFICANT CHANGE UP (ref 98–107)
CO2 SERPL-SCNC: 25 MMOL/L — SIGNIFICANT CHANGE UP (ref 22–31)
CO2 SERPL-SCNC: 26 MMOL/L — SIGNIFICANT CHANGE UP (ref 22–31)
COLOR SPEC: YELLOW — SIGNIFICANT CHANGE UP
CREAT SERPL-MCNC: 0.88 MG/DL — SIGNIFICANT CHANGE UP (ref 0.5–1.3)
CREAT SERPL-MCNC: 0.95 MG/DL — SIGNIFICANT CHANGE UP (ref 0.5–1.3)
GLUCOSE SERPL-MCNC: 102 MG/DL — HIGH (ref 70–99)
GLUCOSE SERPL-MCNC: 90 MG/DL — SIGNIFICANT CHANGE UP (ref 70–99)
GLUCOSE UR-MCNC: NEGATIVE — SIGNIFICANT CHANGE UP
GRAM STN SPT: SIGNIFICANT CHANGE UP
HCT VFR BLD CALC: 28.1 % — LOW (ref 39–50)
HCT VFR BLD CALC: 29.1 % — LOW (ref 39–50)
HCT VFR BLD CALC: 30.4 % — LOW (ref 39–50)
HGB BLD-MCNC: 9.2 G/DL — LOW (ref 13–17)
HGB BLD-MCNC: 9.6 G/DL — LOW (ref 13–17)
HGB BLD-MCNC: 9.9 G/DL — LOW (ref 13–17)
KETONES UR-MCNC: NEGATIVE — SIGNIFICANT CHANGE UP
LACTATE SERPL-SCNC: 1.3 MMOL/L — SIGNIFICANT CHANGE UP (ref 0.5–2)
LEUKOCYTE ESTERASE UR-ACNC: HIGH
MAGNESIUM SERPL-MCNC: 2 MG/DL — SIGNIFICANT CHANGE UP (ref 1.6–2.6)
MAGNESIUM SERPL-MCNC: 2.1 MG/DL — SIGNIFICANT CHANGE UP (ref 1.6–2.6)
MCHC RBC-ENTMCNC: 31.4 PG — SIGNIFICANT CHANGE UP (ref 27–34)
MCHC RBC-ENTMCNC: 31.9 PG — SIGNIFICANT CHANGE UP (ref 27–34)
MCHC RBC-ENTMCNC: 32.1 PG — SIGNIFICANT CHANGE UP (ref 27–34)
MCHC RBC-ENTMCNC: 32.6 % — SIGNIFICANT CHANGE UP (ref 32–36)
MCHC RBC-ENTMCNC: 32.7 % — SIGNIFICANT CHANGE UP (ref 32–36)
MCHC RBC-ENTMCNC: 33 % — SIGNIFICANT CHANGE UP (ref 32–36)
MCV RBC AUTO: 95.9 FL — SIGNIFICANT CHANGE UP (ref 80–100)
MCV RBC AUTO: 97.3 FL — SIGNIFICANT CHANGE UP (ref 80–100)
MCV RBC AUTO: 98.1 FL — SIGNIFICANT CHANGE UP (ref 80–100)
MUCOUS THREADS # UR AUTO: SIGNIFICANT CHANGE UP
NITRITE UR-MCNC: NEGATIVE — SIGNIFICANT CHANGE UP
NON-SQ EPI CELLS # UR AUTO: <1 — SIGNIFICANT CHANGE UP
NRBC # FLD: 0 — SIGNIFICANT CHANGE UP
PH UR: 7.5 — SIGNIFICANT CHANGE UP (ref 4.6–8)
PHOSPHATE SERPL-MCNC: 3.2 MG/DL — SIGNIFICANT CHANGE UP (ref 2.5–4.5)
PHOSPHATE SERPL-MCNC: 3.2 MG/DL — SIGNIFICANT CHANGE UP (ref 2.5–4.5)
PLATELET # BLD AUTO: 181 K/UL — SIGNIFICANT CHANGE UP (ref 150–400)
PLATELET # BLD AUTO: 210 K/UL — SIGNIFICANT CHANGE UP (ref 150–400)
PLATELET # BLD AUTO: 213 K/UL — SIGNIFICANT CHANGE UP (ref 150–400)
PMV BLD: 9.3 FL — SIGNIFICANT CHANGE UP (ref 7–13)
PMV BLD: 9.7 FL — SIGNIFICANT CHANGE UP (ref 7–13)
PMV BLD: 9.7 FL — SIGNIFICANT CHANGE UP (ref 7–13)
POTASSIUM SERPL-MCNC: 3.7 MMOL/L — SIGNIFICANT CHANGE UP (ref 3.5–5.3)
POTASSIUM SERPL-MCNC: 4.4 MMOL/L — SIGNIFICANT CHANGE UP (ref 3.5–5.3)
POTASSIUM SERPL-SCNC: 3.7 MMOL/L — SIGNIFICANT CHANGE UP (ref 3.5–5.3)
POTASSIUM SERPL-SCNC: 4.4 MMOL/L — SIGNIFICANT CHANGE UP (ref 3.5–5.3)
PREALB SERPL-MCNC: 8 MG/DL — LOW (ref 20–40)
PROT SERPL-MCNC: 5.4 G/DL — LOW (ref 6–8.3)
PROT UR-MCNC: 100 — SIGNIFICANT CHANGE UP
RBC # BLD: 2.93 M/UL — LOW (ref 4.2–5.8)
RBC # BLD: 2.99 M/UL — LOW (ref 4.2–5.8)
RBC # BLD: 3.1 M/UL — LOW (ref 4.2–5.8)
RBC # FLD: 13 % — SIGNIFICANT CHANGE UP (ref 10.3–14.5)
RBC # FLD: 13.1 % — SIGNIFICANT CHANGE UP (ref 10.3–14.5)
RBC # FLD: 13.2 % — SIGNIFICANT CHANGE UP (ref 10.3–14.5)
RBC CASTS # UR COMP ASSIST: >50 — HIGH (ref 0–?)
SODIUM SERPL-SCNC: 138 MMOL/L — SIGNIFICANT CHANGE UP (ref 135–145)
SODIUM SERPL-SCNC: 139 MMOL/L — SIGNIFICANT CHANGE UP (ref 135–145)
SP GR SPEC: 1.02 — SIGNIFICANT CHANGE UP (ref 1–1.03)
SPECIMEN SOURCE: SIGNIFICANT CHANGE UP
UROBILINOGEN FLD QL: 8 E.U. — HIGH (ref 0.1–0.2)
WBC # BLD: 15.21 K/UL — HIGH (ref 3.8–10.5)
WBC # BLD: 16.58 K/UL — HIGH (ref 3.8–10.5)
WBC # BLD: 18.78 K/UL — HIGH (ref 3.8–10.5)
WBC # FLD AUTO: 15.21 K/UL — HIGH (ref 3.8–10.5)
WBC # FLD AUTO: 16.58 K/UL — HIGH (ref 3.8–10.5)
WBC # FLD AUTO: 18.78 K/UL — HIGH (ref 3.8–10.5)
WBC UR QL: SIGNIFICANT CHANGE UP (ref 0–?)

## 2017-08-20 PROCEDURE — 74176 CT ABD & PELVIS W/O CONTRAST: CPT | Mod: 26

## 2017-08-20 PROCEDURE — 99233 SBSQ HOSP IP/OBS HIGH 50: CPT

## 2017-08-20 PROCEDURE — 71250 CT THORAX DX C-: CPT | Mod: 26

## 2017-08-20 PROCEDURE — 71010: CPT | Mod: 26

## 2017-08-20 PROCEDURE — 71010: CPT | Mod: 26,77

## 2017-08-20 RX ORDER — CEFEPIME 1 G/1
1000 INJECTION, POWDER, FOR SOLUTION INTRAMUSCULAR; INTRAVENOUS EVERY 12 HOURS
Qty: 0 | Refills: 0 | Status: DISCONTINUED | OUTPATIENT
Start: 2017-08-21 | End: 2017-08-23

## 2017-08-20 RX ORDER — VANCOMYCIN HCL 1 G
VIAL (EA) INTRAVENOUS
Qty: 0 | Refills: 0 | Status: DISCONTINUED | OUTPATIENT
Start: 2017-08-20 | End: 2017-08-20

## 2017-08-20 RX ORDER — FUROSEMIDE 40 MG
10 TABLET ORAL ONCE
Qty: 0 | Refills: 0 | Status: DISCONTINUED | OUTPATIENT
Start: 2017-08-20 | End: 2017-08-22

## 2017-08-20 RX ORDER — VANCOMYCIN HCL 1 G
1000 VIAL (EA) INTRAVENOUS EVERY 12 HOURS
Qty: 0 | Refills: 0 | Status: DISCONTINUED | OUTPATIENT
Start: 2017-08-20 | End: 2017-08-23

## 2017-08-20 RX ORDER — ALBUMIN HUMAN 25 %
250 VIAL (ML) INTRAVENOUS ONCE
Qty: 0 | Refills: 0 | Status: DISCONTINUED | OUTPATIENT
Start: 2017-08-20 | End: 2017-08-20

## 2017-08-20 RX ORDER — ACETAMINOPHEN 500 MG
1000 TABLET ORAL ONCE
Qty: 0 | Refills: 0 | Status: COMPLETED | OUTPATIENT
Start: 2017-08-20 | End: 2017-08-21

## 2017-08-20 RX ORDER — POTASSIUM CHLORIDE 20 MEQ
10 PACKET (EA) ORAL
Qty: 0 | Refills: 0 | Status: COMPLETED | OUTPATIENT
Start: 2017-08-20 | End: 2017-08-20

## 2017-08-20 RX ORDER — FUROSEMIDE 40 MG
10 TABLET ORAL ONCE
Qty: 0 | Refills: 0 | Status: DISCONTINUED | OUTPATIENT
Start: 2017-08-20 | End: 2017-08-20

## 2017-08-20 RX ORDER — CEFEPIME 1 G/1
INJECTION, POWDER, FOR SOLUTION INTRAMUSCULAR; INTRAVENOUS
Qty: 0 | Refills: 0 | Status: DISCONTINUED | OUTPATIENT
Start: 2017-08-20 | End: 2017-08-20

## 2017-08-20 RX ORDER — METRONIDAZOLE 500 MG
TABLET ORAL
Qty: 0 | Refills: 0 | Status: DISCONTINUED | OUTPATIENT
Start: 2017-08-20 | End: 2017-08-23

## 2017-08-20 RX ORDER — METRONIDAZOLE 500 MG
500 TABLET ORAL EVERY 8 HOURS
Qty: 0 | Refills: 0 | Status: DISCONTINUED | OUTPATIENT
Start: 2017-08-20 | End: 2017-08-23

## 2017-08-20 RX ORDER — CEFEPIME 1 G/1
INJECTION, POWDER, FOR SOLUTION INTRAMUSCULAR; INTRAVENOUS
Qty: 0 | Refills: 0 | Status: DISCONTINUED | OUTPATIENT
Start: 2017-08-20 | End: 2017-08-23

## 2017-08-20 RX ORDER — DEXTROSE MONOHYDRATE, SODIUM CHLORIDE, AND POTASSIUM CHLORIDE 50; .745; 4.5 G/1000ML; G/1000ML; G/1000ML
1000 INJECTION, SOLUTION INTRAVENOUS
Qty: 0 | Refills: 0 | Status: DISCONTINUED | OUTPATIENT
Start: 2017-08-20 | End: 2017-08-24

## 2017-08-20 RX ORDER — METRONIDAZOLE 500 MG
500 TABLET ORAL ONCE
Qty: 0 | Refills: 0 | Status: COMPLETED | OUTPATIENT
Start: 2017-08-20 | End: 2017-08-20

## 2017-08-20 RX ORDER — FUROSEMIDE 40 MG
10 TABLET ORAL ONCE
Qty: 0 | Refills: 0 | Status: COMPLETED | OUTPATIENT
Start: 2017-08-20 | End: 2017-08-20

## 2017-08-20 RX ORDER — CEFEPIME 1 G/1
1000 INJECTION, POWDER, FOR SOLUTION INTRAMUSCULAR; INTRAVENOUS ONCE
Qty: 0 | Refills: 0 | Status: COMPLETED | OUTPATIENT
Start: 2017-08-20 | End: 2017-08-20

## 2017-08-20 RX ADMIN — Medication 500 MICROGRAM(S): at 21:32

## 2017-08-20 RX ADMIN — Medication 1 MILLIGRAM(S): at 05:12

## 2017-08-20 RX ADMIN — DEXTROSE MONOHYDRATE, SODIUM CHLORIDE, AND POTASSIUM CHLORIDE 75 MILLILITER(S): 50; .745; 4.5 INJECTION, SOLUTION INTRAVENOUS at 18:10

## 2017-08-20 RX ADMIN — Medication 100 MILLIGRAM(S): at 21:31

## 2017-08-20 RX ADMIN — DEXTROSE MONOHYDRATE, SODIUM CHLORIDE, AND POTASSIUM CHLORIDE 20 MILLILITER(S): 50; .745; 4.5 INJECTION, SOLUTION INTRAVENOUS at 08:12

## 2017-08-20 RX ADMIN — Medication 108 MILLIGRAM(S): at 18:09

## 2017-08-20 RX ADMIN — HYDROMORPHONE HYDROCHLORIDE 30 MILLILITER(S): 2 INJECTION INTRAMUSCULAR; INTRAVENOUS; SUBCUTANEOUS at 19:13

## 2017-08-20 RX ADMIN — Medication 100 MILLIEQUIVALENT(S): at 08:12

## 2017-08-20 RX ADMIN — CHLORHEXIDINE GLUCONATE 1 APPLICATION(S): 213 SOLUTION TOPICAL at 06:15

## 2017-08-20 RX ADMIN — CEFEPIME 100 MILLIGRAM(S): 1 INJECTION, POWDER, FOR SOLUTION INTRAMUSCULAR; INTRAVENOUS at 14:15

## 2017-08-20 RX ADMIN — HEPARIN SODIUM 5000 UNIT(S): 5000 INJECTION INTRAVENOUS; SUBCUTANEOUS at 21:31

## 2017-08-20 RX ADMIN — DORNASE ALFA 2.5 MILLIGRAM(S): 1 SOLUTION RESPIRATORY (INHALATION) at 09:58

## 2017-08-20 RX ADMIN — Medication 100 MILLIEQUIVALENT(S): at 11:37

## 2017-08-20 RX ADMIN — Medication 500 MICROGRAM(S): at 16:11

## 2017-08-20 RX ADMIN — Medication 10 MILLIGRAM(S): at 18:08

## 2017-08-20 RX ADMIN — HEPARIN SODIUM 5000 UNIT(S): 5000 INJECTION INTRAVENOUS; SUBCUTANEOUS at 15:00

## 2017-08-20 RX ADMIN — Medication 100 MILLIEQUIVALENT(S): at 09:27

## 2017-08-20 RX ADMIN — Medication 500 MICROGRAM(S): at 10:08

## 2017-08-20 RX ADMIN — HYDROMORPHONE HYDROCHLORIDE 30 MILLILITER(S): 2 INJECTION INTRAMUSCULAR; INTRAVENOUS; SUBCUTANEOUS at 07:17

## 2017-08-20 RX ADMIN — Medication 100 MILLIGRAM(S): at 15:00

## 2017-08-20 RX ADMIN — Medication 250 MILLIGRAM(S): at 14:30

## 2017-08-20 RX ADMIN — Medication 300 MILLIGRAM(S): at 11:52

## 2017-08-20 RX ADMIN — HEPARIN SODIUM 5000 UNIT(S): 5000 INJECTION INTRAVENOUS; SUBCUTANEOUS at 05:13

## 2017-08-20 RX ADMIN — Medication 108 MILLIGRAM(S): at 05:13

## 2017-08-20 RX ADMIN — Medication 500 MICROGRAM(S): at 03:41

## 2017-08-20 RX ADMIN — PANTOPRAZOLE SODIUM 40 MILLIGRAM(S): 20 TABLET, DELAYED RELEASE ORAL at 11:52

## 2017-08-20 NOTE — PROGRESS NOTE ADULT - ASSESSMENT
s/p Paisley Trent esophagectomy for esophageal cancer     Plan:   - c/w TF at goal  - D/C López  - Ambulate    - PCA for pain control  - Monitor GI function  - Monitor drains for changes in quantity or quality of drainage

## 2017-08-20 NOTE — PROGRESS NOTE ADULT - SUBJECTIVE AND OBJECTIVE BOX
MEJIA LATIF:[id number],   82yMale followed for:  penicillin (Rash)    PAST MEDICAL & SURGICAL HISTORY:  Coronary artery disease: s/p 3 stents on June 30, 2017 at [facility] Lav[facility]  OA (osteoarthritis) of knee: right  Former smoker, stopped smoking in distant past  Former smoker  Rheumatoid arthritis  Seizure disorder: 1 episode approximately 15 yrs ago  Asthma  Hyperlipidemia  BPH (benign prostatic hyperplasia)  HTN (hypertension)  Esophagus cancer  Chronic allergic rhinitis  BCC (basal cell carcinoma): skin  Cerebrovascular accident (CVA)  Anxiety  History of tonsillectomy  H/O heart artery stent: June 30, 2017  Knee arthropathy: left  History of total hip replacement: left  History of hernia repair    FAMILY HISTORY:  Family history of heart attack (Sibling)  Family history of pulmonary embolism (Father): father @ 49 yr old  FH: CAD (coronary artery disease) (Mother)    MEDICATIONS  (STANDING):  HYDROmorphone PCA (1 mG/mL) 30 milliLiter(s) PCA Continuous PCA Continuous  heparin  Injectable 5000 Unit(s) SubCutaneous every 8 hours  aspirin Suppository 300 milliGRAM(s) Rectal daily  phenytoin  IVPB 200 milliGRAM(s) IV Intermittent two times a day  dexamethasone  Injectable 1 milliGRAM(s) IV Push daily  pantoprazole  Injectable 40 milliGRAM(s) IV Push daily  chlorhexidine 4% Liquid 1 Application(s) Topical daily  dornase ziyad Solution 2.5 milliGRAM(s) Inhalation daily  dextrose 5% + sodium chloride 0.9% with potassium chloride 20 mEq/L 1000 milliLiter(s) (20 mL/Hr) IV Continuous <Continuous>  ipratropium    for Nebulization 500 MICROGram(s) Nebulizer every 6 hours  potassium chloride  10 mEq/100 mL IVPB 10 milliEquivalent(s) IV Intermittent every 1 hour    MEDICATIONS  (PRN):  HYDROmorphone PCA (1 mG/mL) Rescue Clinician Bolus 0.5 milliGRAM(s) IV Push every 15 minutes PRN for Pain Scale GREATER THAN 6  naloxone Injectable 0.1 milliGRAM(s) IV Push every 3 minutes PRN For ANY of the following changes in patient status:  A. RR LESS THAN 10 breaths per minute, B. Oxygen saturation LESS THAN 90%, C. Sedation score of 6  ondansetron Injectable 4 milliGRAM(s) IV Push every 6 hours PRN Nausea  ALBUTerol    90 MICROgram(s) HFA Inhaler 2 Puff(s) Inhalation every 4 hours PRN Shortness of Breath      Vital Signs Last 24 Hrs  T(C): 36.9 (20 Aug 2017 08:00), Max: 36.9 (19 Aug 2017 12:00)  T(F): 98.4 (20 Aug 2017 08:00), Max: 98.5 (19 Aug 2017 12:00)  HR: 88 (20 Aug 2017 08:00) (75 - 109)  BP: 95/49 (20 Aug 2017 08:00) (87/45 - 149/46)  BP(mean): 61 (20 Aug 2017 08:00) (52 - 103)  RR: 26 (20 Aug 2017 08:00) (11 - 26)  SpO2: 94% (20 Aug 2017 08:00) (69% - 98%)  nc/at  s1s2  cta  soft, nt, nd no guarding or rebound  no c/c/e    CBC Full  -  ( 20 Aug 2017 03:45 )  WBC Count : 15.21 K/uL  Hemoglobin : 9.2 g/dL  Hematocrit : 28.1 %  Platelet Count - Automated : 181 K/uL  Mean Cell Volume : 95.9 fL  Mean Cell Hemoglobin : 31.4 pg  Mean Cell Hemoglobin Concentration : 32.7 %  Auto Neutrophil # : x  Auto Lymphocyte # : x  Auto Monocyte # : x  Auto Eosinophil # : x  Auto Basophil # : x  Auto Neutrophil % : x  Auto Lymphocyte % : x  Auto Monocyte % : x  Auto Eosinophil % : x  Auto Basophil % : x    08-20    139  |  101  |  15  ----------------------------<  90  3.7   |  25  |  0.88    Ca    8.8      20 Aug 2017 03:45  Phos  3.2     08-20  Mg     2.0     08-20    TPro  5.4<L>  /  Alb  2.4<L>  /  TBili  1.5<H>  /  DBili  x   /  AST  76<H>  /  ALT  55<H>  /  AlkPhos  304<H>  08-20

## 2017-08-20 NOTE — PROGRESS NOTE ADULT - SUBJECTIVE AND OBJECTIVE BOX
82 yr old male with hx of cancer of esophagus s/p chemo and radiation therapy presents for preop evaluation.  Pt is now scheduled for Esophagogastroduodenoscopy, Thoracoscopic Mobilization of Stomach, Robotic Assisted Laparoscopic Esophagogastrectomy Feeding Jejunostomy, Tube, Possible Open on 07/14/17.  Of note upon physical exam for medical/cardiac clearance pt was sent for coronary ct valenzuela and was noted have blockage, pt then had angiogram and bare metal stents x 3 placed June 30, 2017.   POD # 6 ([id number]): Esophagogastrectomy  and J tube placement  for Malignant neoplasm of lower third of esophagus      Issues:  Elevated WBC  Gregg output  Elevated NGT output  Esophageal cancer  Asthma  Postop pain  CAD s/p stent              malnutrition              Hx: CAD- stents,    	esophageal cancer,   BPH    Interval/Overnight Events/ ROS  Pt remained hemodynamically stable overnight, not on any pressors or inotropes. OOB to chair, breathing comfortably with minimal pain. Ambulated several times, no SOB, no palpitations, no nausea/ no vomiting, no dizziness  Pan-cultured, started on triple ABXs empirically (imipenem, vanco, falgyl)  CT scan chest, abd, pelvis w NGT & J-tube contrast done: No obvious leak or obstruction (excluding esophagua). Pleural effusions B/L    Home Medications:   * Patient Currently Takes Medications as of 11-Aug-2017 10:25 documented in Prescription Writer  · 	aspirin 81 mg oral tablet: Last Dose Taken:  , 1 tab(s) orally once a day  last  8/11 dose   · 	ProAir HFA 90 mcg/inh inhalation aerosol: 2 puff(s) inhaled 4 times a day, As Needed   · 	Vitamin B-12 500 mcg oral tablet: 1 tab(s) orally once a day  · 	amLODIPine 5 mg oral tablet: Last Dose Taken:  , 1 tab(s) orally once a day in pm  · 	simvastatin 40 mg oral tablet: Last Dose Taken:  , 1 tab(s) orally once a day in pm  · 	tamsulosin 0.4 mg oral capsule: Last Dose Taken:  , 1 cap(s) orally once a day in pm  · 	Zetia 10 mg oral tablet: Last Dose Taken:  , 1 tab(s) orally once a day in pm  · 	ALPRAZolam 0.25 mg oral tablet: Last Dose Taken:  , 2 tab(s) orally 2 times a day  · 	olmesartan 20 mg oral tablet: Last Dose Taken:  , 1 tab(s) orally once a day in pm  · 	montelukast 10 mg oral tablet: Last Dose Taken:  , 1 tab(s) orally once a day in pm  · 	Dilantin 100 mg oral capsule: Last Dose Taken:  , 2 cap(s) orally 2 times a day  · 	predniSONE 5 mg oral tablet: Last Dose Taken:  , 1 tab(s) orally once a day in am  · 	finasteride 5 mg oral tablet: Last Dose Taken:  , 1 tab(s) orally once a day in pm  · 	Centrum Silver oral tablet: Last Dose Taken:  , 1 tab(s) orally once a day  last dose 8/11  · 	Plavix 75 mg oral tablet: Last Dose Taken:  , 1 tab(s) orally once a day  last dose 8/10  · 	folic acid 0.4 mg oral tablet: 1 tab(s) orally once a day  · 	Vitamin B6 100 mg oral tablet: 1 tab(s) orally once a day    MEDICATIONS  (STANDING):  HYDROmorphone PCA (1 mG/mL) 30 milliLiter(s) PCA Continuous PCA Continuous  heparin  Injectable 5000 Unit(s) SubCutaneous every 8 hours  aspirin Suppository 300 milliGRAM(s) Rectal daily  phenytoin  IVPB 200 milliGRAM(s) IV Intermittent two times a day  dexamethasone  Injectable 1 milliGRAM(s) IV Push daily  pantoprazole  Injectable 40 milliGRAM(s) IV Push daily  chlorhexidine 4% Liquid 1 Application(s) Topical daily  dornase ziyad Solution 2.5 milliGRAM(s) Inhalation daily  ipratropium    for Nebulization 500 MICROGram(s) Nebulizer every 6 hours  metroNIDAZOLE  IVPB   IV Intermittent   metroNIDAZOLE  IVPB 500 milliGRAM(s) IV Intermittent every 8 hours  cefepime  IVPB   IV Intermittent   vancomycin  IVPB 1000 milliGRAM(s) IV Intermittent every 12 hours  furosemide   Injectable 10 milliGRAM(s) IV Push once  dextrose 5% + sodium chloride 0.9% with potassium chloride 20 mEq/L 1000 milliLiter(s) (75 mL/Hr) IV Continuous <Continuous>    MEDICATIONS  (PRN):  HYDROmorphone PCA (1 mG/mL) Rescue Clinician Bolus 0.5 milliGRAM(s) IV Push every 15 minutes PRN for Pain Scale GREATER THAN 6  naloxone Injectable 0.1 milliGRAM(s) IV Push every 3 minutes PRN For ANY of the following changes in patient status:  A. RR LESS THAN 10 breaths per minute, B. Oxygen saturation LESS THAN 90%, C. Sedation score of 6  ondansetron Injectable 4 milliGRAM(s) IV Push every 6 hours PRN Nausea    ICU Vital Signs Last 24 Hrs  T(C): 36.9 (20 Aug 2017 16:00), Max: 36.9 (20 Aug 2017 04:00)  T(F): 98.4 (20 Aug 2017 16:00), Max: 98.5 (20 Aug 2017 04:00)  HR: 92 (20 Aug 2017 17:00) (78 - 100)  BP: 107/51 (20 Aug 2017 17:00) (90/45 - 149/46)  BP(mean): 65 (20 Aug 2017 17:00) (52 - 103)  ABP: --  ABP(mean): --  RR: 19 (20 Aug 2017 17:00) (11 - 26)  SpO2: 99% (20 Aug 2017 17:00) (91% - 100%)    Physical exam:                           General:             Comfortable, Awake, alert, not in any distress  Neuro:               Moving all extremities to commands. No focal deficits	  HEENT:            CARLOS/ NGT (+)  Respiratory:	Lungs on auscultation bilaterally with coarse breath sounds                            No rales,(+)  rhonchi, no wheezing. Effort even and unlabored.                          Right chest tube and gregg sites - clean; minimal  thoracic subcutaneus  emphysema  CV:		Regular rate and rhythm.(+)  S1/S2. No murmurs  Abdomen:          Soft,  nontender, not-distended. Bowel sounds present ; J tube site - clean   Skin:		No rash.  Extremities:	Warm, no cyanosis or edema.  Palpable pulses    I&O's Summary    19 Aug 2017 07:01  -  20 Aug 2017 07:00  --------------------------------------------------------  IN: 2300 mL / OUT: 1890 mL / NET: 410 mL    20 Aug 2017 07:01  -  20 Aug 2017 17:38  --------------------------------------------------------  IN: 1940 mL / OUT: 815 mL / NET: 1125 mL    Labs:                                                                        9.6    18.78 )-----------( 210      ( 20 Aug 2017 13:10 )             29.1                            08-20    138  |  99  |  16  ----------------------------<  102<H>  4.4   |  26  |  0.95    Ca    9.4      20 Aug 2017 13:10  Phos  3.2     08-20  Mg     2.1     08-20    TPro  5.4<L>  /  Alb  2.4<L>  /  TBili  1.5<H>  /  DBili  x   /  AST  76<H>  /  ALT  55<H>  /  AlkPhos  304<H>  08-20    LIVER FUNCTIONS - ( 20 Aug 2017 03:45 )  Alb: 2.4 g/dL / Pro: 5.4 g/dL / ALK PHOS: 304 u/L / ALT: 55 u/L / AST: 76 u/L / GGT: x            Plan:  82 yr old male with hx of cancer of esophagus s/p chemo and radiation therapy presents for preop evaluation.  Pt is now scheduled for Esophagogastroduodenoscopy, Thoracoscopic Mobilization of Stomach, Robotic Assisted Laparoscopic Esophagogastrectomy Feeding Jejunostomy, Tube, Possible Open on 07/14/17.  Of note upon physical exam for medical/cardiac clearance pt was sent for coronary ct valenzuela and was noted have blockage, pt then had angiogram and bare metal stents x 3 placed June 30, 2017.   POD # 6 ([id number]): Esophagogastrectomy  and J tube placement  for Malignant neoplasm of lower third of esophagus      Issues:  Elevated WBC  Gregg output  Elevated NGT output  Esophageal cancer  Asthma  Postop pain  CAD s/p stent              malnutrition              Hx: CAD- stents,    	esophageal cancer,               BPH                                                      Neuro:                                         	Pain control with IV tylenol  Seizure disorder: On Dilantin IV 200bid                            Cardiovascular:                                          	Continue hemodynamic monitoring.  Continue hemodynamic monitoring/ telemetry  ASA - suppository daily for CAD/ stentsCAD: Start ASA 300mg OR daily, No plavix yet                            Respiratory:   Pt is on       2     L nasal canula   Comfortable, no evidence of distress.  Pulmonary toilet                                       	Monitor right Gregg  tube output ( to bulb suction)                                         Continue bronchodilators, pulmonary toilet  Monitor subcutaneous emphysema  Using incentive spirometry & doing  ~500-750  ml	  Head of bed elevation to 30-40 degrees  OOB, ambulation                                GI                                         Continue GI prophylaxis with Protonix                                         Continue Zofran / Reglan for nausea - PRN	                                         NGT & J-tube flushes Q4hrs  On J tube Jevity 50 cc / h diet, tolerating well (goal)  IV  Zofran / Reglan for nausea - PRN	  NPO  NGT to continuous suction  Barium swallow on Monday 8/21/17?                            Renal:                                         Monitor I/Os BUN/ Cr and electrolytes                                         Keep López until Proscar is restarted   BPH: OFF  Flomax/ Finasteride while still   NPO post surgery                                                 Hem/ Onc:                                                Monitor chest tube output &  signs of bleeding.                                          Follow CBC daily                                         Glycemic monitoring                                         F/S with coverage                           Infectious disease:                                           No signs of infection. Monitor for fever / leukocytosis.                                         All surgical incision / chest tube sites look clean  			   PanCulture  			   Starting empiric BS ABXs (3)                             Endocrine                                       	Continue Accu-Checks with coverage  			Glycemic monitoring  F/S with coverage  Pertinent clinical, laboratory, radiographic, hemodynamic, echocardiographic, respiratory data, microbiologic data and chart were reviewed and analyzed frequently throughout the course of the day and night. GI and DVT prophylaxis, glycemic control, head of bed elevation and skin care issues were addressed.  Patient seen, examined and plan discussed with CT Surgery Dr Reynoso  / CTICU team during rounds.    I have spent  80 minutes of critical care time with this pt between  8  am  and 11:55 pm    Aleksandr Hayden MD

## 2017-08-20 NOTE — PROGRESS NOTE ADULT - SUBJECTIVE AND OBJECTIVE BOX
Anesthesia Pain Management Service    SUBJECTIVE: Patient is doing well with IV PCA and no significant problems reported.    Pain Scale Score	At rest: ___ 	With Activity: ___ 	[X ] Refer to charted pain scores    THERAPY:    [ ] IV PCA Morphine		[ ] 5 mg/mL	[ ] 1 mg/mL  [X ] IV PCA Hydromorphone	[ ] 5 mg/mL	[X ] 1 mg/mL  [ ] IV PCA Fentanyl		[ ] 50 micrograms/mL    Demand dose __0.2_ lockout __6_ (minutes) Continuous Rate _0__ Total: _2.6__  mg used (in past 24 hours)      MEDICATIONS  (STANDING):  HYDROmorphone PCA (1 mG/mL) 30 milliLiter(s) PCA Continuous PCA Continuous  heparin  Injectable 5000 Unit(s) SubCutaneous every 8 hours  aspirin Suppository 300 milliGRAM(s) Rectal daily  phenytoin  IVPB 200 milliGRAM(s) IV Intermittent two times a day  dexamethasone  Injectable 1 milliGRAM(s) IV Push daily  pantoprazole  Injectable 40 milliGRAM(s) IV Push daily  chlorhexidine 4% Liquid 1 Application(s) Topical daily  dornase ziyad Solution 2.5 milliGRAM(s) Inhalation daily  dextrose 5% + sodium chloride 0.9% with potassium chloride 20 mEq/L 1000 milliLiter(s) (20 mL/Hr) IV Continuous <Continuous>  ipratropium    for Nebulization 500 MICROGram(s) Nebulizer every 6 hours  potassium chloride  10 mEq/100 mL IVPB 10 milliEquivalent(s) IV Intermittent every 1 hour    MEDICATIONS  (PRN):  HYDROmorphone PCA (1 mG/mL) Rescue Clinician Bolus 0.5 milliGRAM(s) IV Push every 15 minutes PRN for Pain Scale GREATER THAN 6  naloxone Injectable 0.1 milliGRAM(s) IV Push every 3 minutes PRN For ANY of the following changes in patient status:  A. RR LESS THAN 10 breaths per minute, B. Oxygen saturation LESS THAN 90%, C. Sedation score of 6  ondansetron Injectable 4 milliGRAM(s) IV Push every 6 hours PRN Nausea  ALBUTerol    90 MICROgram(s) HFA Inhaler 2 Puff(s) Inhalation every 4 hours PRN Shortness of Breath      OBJECTIVE:    Sedation Score:	[ X] Alert	[ ] Drowsy 	[ ] Arousable	[ ] Asleep	[ ] Unresponsive    Side Effects:	[X ] None	[ ] Nausea	[ ] Vomiting	[ ] Pruritus  		[ ] Other:    Vital Signs Last 24 Hrs  T(C): 36.9 (20 Aug 2017 08:00), Max: 36.9 (19 Aug 2017 12:00)  T(F): 98.4 (20 Aug 2017 08:00), Max: 98.5 (19 Aug 2017 12:00)  HR: 90 (20 Aug 2017 10:08) (75 - 109)  BP: 111/69 (20 Aug 2017 10:00) (87/45 - 149/46)  BP(mean): 79 (20 Aug 2017 10:00) (52 - 103)  RR: 20 (20 Aug 2017 10:00) (11 - 26)  SpO2: 100% (20 Aug 2017 10:08) (69% - 100%)    ASSESSMENT/ PLAN    Therapy to  be:	[ X] Continue   [ ] Discontinued   [ ] Change to prn Analgesics    Documentation and Verification of current medications:   [X] Done	[ ] Not done, not elligible    Comments: NGT    Patient was seen 08-20-17 @ 10:34

## 2017-08-20 NOTE — PROGRESS NOTE ADULT - SUBJECTIVE AND OBJECTIVE BOX
D Team Surgery  Daily Progress Note    24 Hour:  - Had some low BP, 87/45, and was given Albumin and Lasix. UOP was 810. Last BP 94/49.  - Removed CT. No change in respiratory function.   - New leukocytosis. Afebrile. No increase in abdominal pain or change in exam. Drains remain serous.   - Increase in bilirubin and alk phos from last check on 8/6. No jaundice or pruritis. Abdomen is benign.   - Flatus. No BM.    Vital Signs:  - T(C) Max: 36.9 (19 Aug 2017 12:00)  - HR: 88, 75 - 109  - BP: 95/49, 87/45 - 149/46  - RR: 26, 11 - 26  - SpO2: 94%, 69% - 98%    I&O's:  8/19-8/20:  - Albumin: 250 mL   - Bulb: 210 mL => serous  - Chest Tube: 20 mL => serous   - Indwelling Catheter - Urethral: 810 mL  - Nasoenteral Tube: 850 mL => non-bloody, bilious   - Total Net: 410 mL                          9.2    15.21 )-----------( 181      ( 20 Aug 2017 03:45 )             28.1       08-20    139  |  101  |  15  ----------------------------<  90  3.7   |  25  |  0.88    Ca    8.8      20 Aug 2017 03:45  Phos  3.2     08-20  Mg     2.0     08-20    TPro  5.4<L>  /  Alb  2.4<L>  /  TBili  1.5  /  AST  76  /  ALT  55  /  AlkPhos  304                                                  TBili 0.1  /   AST 48   /   ALT 49   /   AlkPhos 191 (8/6/17)    CXR (8/19): Tubes and lines are unchanged. Improved aeration of the lungs with small residual left effusion. Subcutaneous emphysema  in both sides of the neck and chest wall unchanged. No pneumothorax appreciated.    Assessment: Robert eD Luna is an 82 y.o. man with history of smoking (distant), seizure disorder (on phenytoin), and CVA who is POD 6 from Vance Trent esophagectomy for esophageal cancer (s/p chemo and radiation). Pain is well controlled and tolerating tube feeds. Now with flatus; still no BM. Serous quality of drainage shows no indication of leaks. CT removed without issue. Cause of new leukocytosis and elevated bilirubin and alk phos is not known. Presumably, leukocytosis is reactive from trauma of CT removal. Continue to monitor respiratory status and vital signs. No direct or indirect bilirubin. Elevated total could be resorption from operation. No signs of bile from drainage that would indicate a leak.     Plan:    - Remove López  - CT with oral and IV contrast tomorrow to look for intraabdominal collections; also help with bowel function  - Chest PT

## 2017-08-20 NOTE — PROGRESS NOTE ADULT - SUBJECTIVE AND OBJECTIVE BOX
s/p robotic-assisted laparoscopic Vance Trent esophagogastrectomy,    pt says he feels better , denies chest pain, shortness of breath, nausea, vomiting, intermittent cough +    MEDICATIONS  (STANDING):  HYDROmorphone PCA (1 mG/mL) 30 milliLiter(s) PCA Continuous PCA Continuous  heparin  Injectable 5000 Unit(s) SubCutaneous every 8 hours  aspirin Suppository 300 milliGRAM(s) Rectal daily  phenytoin  IVPB 200 milliGRAM(s) IV Intermittent two times a day  dexamethasone  Injectable 1 milliGRAM(s) IV Push daily  pantoprazole  Injectable 40 milliGRAM(s) IV Push daily  chlorhexidine 4% Liquid 1 Application(s) Topical daily  dornase ziyad Solution 2.5 milliGRAM(s) Inhalation daily  ipratropium    for Nebulization 500 MICROGram(s) Nebulizer every 6 hours  metroNIDAZOLE  IVPB   IV Intermittent   metroNIDAZOLE  IVPB 500 milliGRAM(s) IV Intermittent every 8 hours  cefepime  IVPB   IV Intermittent   vancomycin  IVPB 1000 milliGRAM(s) IV Intermittent every 12 hours  dextrose 5% + sodium chloride 0.9% with potassium chloride 20 mEq/L 1000 milliLiter(s) (75 mL/Hr) IV Continuous <Continuous>  acetaminophen  IVPB. 1000 milliGRAM(s) IV Intermittent once    MEDICATIONS  (PRN):  HYDROmorphone PCA (1 mG/mL) Rescue Clinician Bolus 0.5 milliGRAM(s) IV Push every 15 minutes PRN for Pain Scale GREATER THAN 6  naloxone Injectable 0.1 milliGRAM(s) IV Push every 3 minutes PRN For ANY of the following changes in patient status:  A. RR LESS THAN 10 breaths per minute, B. Oxygen saturation LESS THAN 90%, C. Sedation score of 6  ondansetron Injectable 4 milliGRAM(s) IV Push every 6 hours PRN Nausea  furosemide   Injectable 10 milliGRAM(s) IV Push once PRN for urine OP <20 ml/hr    Vital Signs Last 24 Hrs  T(C): 36.8 (20 Aug 2017 20:00), Max: 36.9 (20 Aug 2017 04:00)  T(F): 98.3 (20 Aug 2017 20:00), Max: 98.5 (20 Aug 2017 04:00)  HR: 92 (20 Aug 2017 22:00) (78 - 100)  BP: 105/57 (20 Aug 2017 22:00) (95/49 - 149/46)  BP(mean): 68 (20 Aug 2017 22:00) (52 - 103)  RR: 26 (20 Aug 2017 22:00) (15 - 26)  SpO2: 98% (20 Aug 2017 22:00) (92% - 100%)    Constitutional: No fever, fatigue  Skin: No rash.  Eyes: No recent vision problems or eye pain.  ENT: No congestion, ear pain, or sore throat.  Cardiovascular: No chest pain or palpation.  Respiratory: No cough, shortness of breath, congestion, or wheezing.  Gastrointestinal: No abdominal pain, nausea, vomiting, or diarrhea.  Genitourinary: No dysuria.  Musculoskeletal: No joint swelling.  Neurologic: No headache.    HEENT :peerla, eomi  CVS : s1, s2+  RS :dec breath sounds at bases  ABD : soft, bs+  EXT :no edema  NEURO :alert awake oriented    LABS:      138  |  99  |  16  ----------------------------<  102<H>  4.4   |  26  |  0.95    Ca    9.4      20 Aug 2017 13:10  Phos  3.2       Mg     2.1         TPro  5.4<L>  /  Alb  2.4<L>  /  TBili  1.5<H>  /  DBili      /  AST  76<H>  /  ALT  55<H>  /  AlkPhos  304<H>      Creatinine Trend: 0.95 <--, 0.88 <--, 0.78 <--, 0.82 <--, 0.82 <--, 0.79 <--, 0.88 <--, 1.07 <--, 1.04 <--, 1.05 <--                        9.6    18.78 )-----------( 210      ( 20 Aug 2017 13:10 )             29.1     Urine Studies:  Urinalysis Basic - ( 20 Aug 2017 14:40 )    Color: YELLOW / Appearance: HAZY / S.022 / pH: 7.5  Gluc: NEGATIVE / Ketone: NEGATIVE  / Bili: SMALL / Urobili: 8 E.U.   Blood: LARGE / Protein: 100 / Nitrite: NEGATIVE   Leuk Esterase: SMALL / RBC: >50 / WBC 10-25   Sq Epi:  / Non Sq Epi:  / Bacteria: FEW              LIVER FUNCTIONS - ( 20 Aug 2017 03:45 )  Alb: 2.4 g/dL / Pro: 5.4 g/dL / ALK PHOS: 304 u/L / ALT: 55 u/L / AST: 76 u/L / GGT: x

## 2017-08-21 ENCOUNTER — APPOINTMENT (OUTPATIENT)
Dept: PHARMACY | Facility: CLINIC | Age: 82
End: 2017-08-21
Payer: SELF-PAY

## 2017-08-21 LAB
BASOPHILS # BLD AUTO: 0.02 K/UL — SIGNIFICANT CHANGE UP (ref 0–0.2)
BASOPHILS NFR BLD AUTO: 0.1 % — SIGNIFICANT CHANGE UP (ref 0–2)
BUN SERPL-MCNC: 16 MG/DL — SIGNIFICANT CHANGE UP (ref 7–23)
CALCIUM SERPL-MCNC: 9.4 MG/DL — SIGNIFICANT CHANGE UP (ref 8.4–10.5)
CHLORIDE SERPL-SCNC: 102 MMOL/L — SIGNIFICANT CHANGE UP (ref 98–107)
CO2 SERPL-SCNC: 26 MMOL/L — SIGNIFICANT CHANGE UP (ref 22–31)
CREAT SERPL-MCNC: 0.88 MG/DL — SIGNIFICANT CHANGE UP (ref 0.5–1.3)
EOSINOPHIL # BLD AUTO: 0.31 K/UL — SIGNIFICANT CHANGE UP (ref 0–0.5)
EOSINOPHIL NFR BLD AUTO: 2.1 % — SIGNIFICANT CHANGE UP (ref 0–6)
GLUCOSE SERPL-MCNC: 101 MG/DL — HIGH (ref 70–99)
HCT VFR BLD CALC: 27.6 % — LOW (ref 39–50)
HGB BLD-MCNC: 9 G/DL — LOW (ref 13–17)
IMM GRANULOCYTES # BLD AUTO: 0.08 # — SIGNIFICANT CHANGE UP
IMM GRANULOCYTES NFR BLD AUTO: 0.6 % — SIGNIFICANT CHANGE UP (ref 0–1.5)
LYMPHOCYTES # BLD AUTO: 0.76 K/UL — LOW (ref 1–3.3)
LYMPHOCYTES # BLD AUTO: 5.2 % — LOW (ref 13–44)
MAGNESIUM SERPL-MCNC: 1.9 MG/DL — SIGNIFICANT CHANGE UP (ref 1.6–2.6)
MCHC RBC-ENTMCNC: 31.5 PG — SIGNIFICANT CHANGE UP (ref 27–34)
MCHC RBC-ENTMCNC: 32.6 % — SIGNIFICANT CHANGE UP (ref 32–36)
MCV RBC AUTO: 96.5 FL — SIGNIFICANT CHANGE UP (ref 80–100)
MONOCYTES # BLD AUTO: 1.26 K/UL — HIGH (ref 0–0.9)
MONOCYTES NFR BLD AUTO: 8.7 % — SIGNIFICANT CHANGE UP (ref 2–14)
NEUTROPHILS # BLD AUTO: 12.1 K/UL — HIGH (ref 1.8–7.4)
NEUTROPHILS NFR BLD AUTO: 83.3 % — HIGH (ref 43–77)
NRBC # FLD: 0 — SIGNIFICANT CHANGE UP
PHOSPHATE SERPL-MCNC: 3.1 MG/DL — SIGNIFICANT CHANGE UP (ref 2.5–4.5)
PLATELET # BLD AUTO: 197 K/UL — SIGNIFICANT CHANGE UP (ref 150–400)
PMV BLD: 9.4 FL — SIGNIFICANT CHANGE UP (ref 7–13)
POTASSIUM SERPL-MCNC: 3.8 MMOL/L — SIGNIFICANT CHANGE UP (ref 3.5–5.3)
POTASSIUM SERPL-SCNC: 3.8 MMOL/L — SIGNIFICANT CHANGE UP (ref 3.5–5.3)
RBC # BLD: 2.86 M/UL — LOW (ref 4.2–5.8)
RBC # FLD: 13.1 % — SIGNIFICANT CHANGE UP (ref 10.3–14.5)
SODIUM SERPL-SCNC: 139 MMOL/L — SIGNIFICANT CHANGE UP (ref 135–145)
SPECIMEN SOURCE: SIGNIFICANT CHANGE UP
WBC # BLD: 14.53 K/UL — HIGH (ref 3.8–10.5)
WBC # FLD AUTO: 14.53 K/UL — HIGH (ref 3.8–10.5)

## 2017-08-21 PROCEDURE — 74220 X-RAY XM ESOPHAGUS 1CNTRST: CPT | Mod: 26

## 2017-08-21 PROCEDURE — 71010: CPT | Mod: 26

## 2017-08-21 PROCEDURE — V5014C: CUSTOM

## 2017-08-21 PROCEDURE — 71250 CT THORAX DX C-: CPT | Mod: 26

## 2017-08-21 RX ORDER — MAGNESIUM SULFATE 500 MG/ML
1 VIAL (ML) INJECTION ONCE
Qty: 0 | Refills: 0 | Status: COMPLETED | OUTPATIENT
Start: 2017-08-21 | End: 2017-08-21

## 2017-08-21 RX ORDER — IPRATROPIUM/ALBUTEROL SULFATE 18-103MCG
3 AEROSOL WITH ADAPTER (GRAM) INHALATION EVERY 6 HOURS
Qty: 0 | Refills: 0 | Status: DISCONTINUED | OUTPATIENT
Start: 2017-08-21 | End: 2017-08-22

## 2017-08-21 RX ORDER — FUROSEMIDE 40 MG
10 TABLET ORAL ONCE
Qty: 0 | Refills: 0 | Status: COMPLETED | OUTPATIENT
Start: 2017-08-21 | End: 2017-08-21

## 2017-08-21 RX ORDER — POTASSIUM CHLORIDE 20 MEQ
10 PACKET (EA) ORAL
Qty: 0 | Refills: 0 | Status: COMPLETED | OUTPATIENT
Start: 2017-08-21 | End: 2017-08-21

## 2017-08-21 RX ORDER — DORNASE ALFA 1 MG/ML
2.5 SOLUTION RESPIRATORY (INHALATION) DAILY
Qty: 0 | Refills: 0 | Status: DISCONTINUED | OUTPATIENT
Start: 2017-08-21 | End: 2017-08-28

## 2017-08-21 RX ORDER — FUROSEMIDE 40 MG
10 TABLET ORAL ONCE
Qty: 0 | Refills: 0 | Status: COMPLETED | OUTPATIENT
Start: 2017-08-22 | End: 2017-08-22

## 2017-08-21 RX ADMIN — Medication 250 MILLIGRAM(S): at 05:16

## 2017-08-21 RX ADMIN — Medication 1 MILLIGRAM(S): at 05:33

## 2017-08-21 RX ADMIN — Medication 100 MILLIGRAM(S): at 22:19

## 2017-08-21 RX ADMIN — DEXTROSE MONOHYDRATE, SODIUM CHLORIDE, AND POTASSIUM CHLORIDE 75 MILLILITER(S): 50; .745; 4.5 INJECTION, SOLUTION INTRAVENOUS at 17:34

## 2017-08-21 RX ADMIN — HEPARIN SODIUM 5000 UNIT(S): 5000 INJECTION INTRAVENOUS; SUBCUTANEOUS at 13:09

## 2017-08-21 RX ADMIN — Medication 100 MILLIEQUIVALENT(S): at 03:52

## 2017-08-21 RX ADMIN — Medication 250 MILLIGRAM(S): at 17:03

## 2017-08-21 RX ADMIN — Medication 100 MILLIGRAM(S): at 13:09

## 2017-08-21 RX ADMIN — CHLORHEXIDINE GLUCONATE 1 APPLICATION(S): 213 SOLUTION TOPICAL at 04:44

## 2017-08-21 RX ADMIN — Medication 108 MILLIGRAM(S): at 05:33

## 2017-08-21 RX ADMIN — HYDROMORPHONE HYDROCHLORIDE 30 MILLILITER(S): 2 INJECTION INTRAMUSCULAR; INTRAVENOUS; SUBCUTANEOUS at 07:21

## 2017-08-21 RX ADMIN — Medication 10 MILLIGRAM(S): at 17:34

## 2017-08-21 RX ADMIN — Medication 400 MILLIGRAM(S): at 13:15

## 2017-08-21 RX ADMIN — Medication 100 MILLIGRAM(S): at 05:16

## 2017-08-21 RX ADMIN — Medication 100 GRAM(S): at 06:12

## 2017-08-21 RX ADMIN — Medication 3 MILLILITER(S): at 09:24

## 2017-08-21 RX ADMIN — CEFEPIME 100 MILLIGRAM(S): 1 INJECTION, POWDER, FOR SOLUTION INTRAMUSCULAR; INTRAVENOUS at 06:12

## 2017-08-21 RX ADMIN — Medication 500 MICROGRAM(S): at 04:04

## 2017-08-21 RX ADMIN — Medication 108 MILLIGRAM(S): at 17:02

## 2017-08-21 RX ADMIN — Medication 100 MILLIEQUIVALENT(S): at 05:16

## 2017-08-21 RX ADMIN — Medication 3 MILLILITER(S): at 21:24

## 2017-08-21 RX ADMIN — Medication 300 MILLIGRAM(S): at 13:09

## 2017-08-21 RX ADMIN — HEPARIN SODIUM 5000 UNIT(S): 5000 INJECTION INTRAVENOUS; SUBCUTANEOUS at 22:19

## 2017-08-21 RX ADMIN — Medication 1000 MILLIGRAM(S): at 13:30

## 2017-08-21 RX ADMIN — DORNASE ALFA 2.5 MILLIGRAM(S): 1 SOLUTION RESPIRATORY (INHALATION) at 09:15

## 2017-08-21 RX ADMIN — DEXTROSE MONOHYDRATE, SODIUM CHLORIDE, AND POTASSIUM CHLORIDE 75 MILLILITER(S): 50; .745; 4.5 INJECTION, SOLUTION INTRAVENOUS at 09:21

## 2017-08-21 RX ADMIN — PANTOPRAZOLE SODIUM 40 MILLIGRAM(S): 20 TABLET, DELAYED RELEASE ORAL at 13:09

## 2017-08-21 RX ADMIN — HYDROMORPHONE HYDROCHLORIDE 30 MILLILITER(S): 2 INJECTION INTRAMUSCULAR; INTRAVENOUS; SUBCUTANEOUS at 19:02

## 2017-08-21 RX ADMIN — CEFEPIME 100 MILLIGRAM(S): 1 INJECTION, POWDER, FOR SOLUTION INTRAMUSCULAR; INTRAVENOUS at 17:03

## 2017-08-21 RX ADMIN — DEXTROSE MONOHYDRATE, SODIUM CHLORIDE, AND POTASSIUM CHLORIDE 75 MILLILITER(S): 50; .745; 4.5 INJECTION, SOLUTION INTRAVENOUS at 07:21

## 2017-08-21 RX ADMIN — Medication 3 MILLILITER(S): at 15:37

## 2017-08-21 RX ADMIN — HEPARIN SODIUM 5000 UNIT(S): 5000 INJECTION INTRAVENOUS; SUBCUTANEOUS at 05:34

## 2017-08-21 NOTE — PROGRESS NOTE ADULT - SUBJECTIVE AND OBJECTIVE BOX
82 yr old male with hx of cancer of esophagus s/p chemo and radiation therapy presents for preop evaluation.  Pt is now scheduled for Esophagogastroduodenoscopy, Thoracoscopic Mobilization of Stomach, Robotic Assisted Laparoscopic Esophagogastrectomy Feeding Jejunostomy, Tube, Possible Open on 07/14/17.  Of note upon physical exam for medical/cardiac clearance pt was sent for coronary ct valenzuela and was noted have blockage, pt then had angiogram and bare metal stents x 3 placed June 30, 2017.   POD # 7 ([id number]): Esophagogastrectomy  and J tube placement  for Malignant neoplasm of lower third of esophagus      Issues:  Elevated WBC  Gregg output  Elevated NGT output  Esophageal cancer  Asthma  Postop pain  CAD s/p stent              malnutrition              Hx: CAD- stents,    	esophageal cancer,   BPH    Interval/Overnight Events/ ROS  Pt remained hemodynamically stable overnight, not on any pressors or inotropes. OOB to chair, breathing comfortably with minimal pain. Ambulated several times, no SOB, no palpitations, no nausea/ no vomiting, no dizziness  Pan-cultured, started on triple ABXs empirically (imipenem, vanco, falgyl)  CT scan chest, abd, pelvis w NGT & J-tube contrast done:    Home Medications:   * Patient Currently Takes Medications as of 11-Aug-2017 10:25 documented in Prescription Writer  · 	aspirin 81 mg oral tablet: Last Dose Taken:  , 1 tab(s) orally once a day  last  8/11 dose   · 	ProAir HFA 90 mcg/inh inhalation aerosol: 2 puff(s) inhaled 4 times a day, As Needed   · 	Vitamin B-12 500 mcg oral tablet: 1 tab(s) orally once a day  · 	amLODIPine 5 mg oral tablet: Last Dose Taken:  , 1 tab(s) orally once a day in pm  · 	simvastatin 40 mg oral tablet: Last Dose Taken:  , 1 tab(s) orally once a day in pm  · 	tamsulosin 0.4 mg oral capsule: Last Dose Taken:  , 1 cap(s) orally once a day in pm  · 	Zetia 10 mg oral tablet: Last Dose Taken:  , 1 tab(s) orally once a day in pm  · 	ALPRAZolam 0.25 mg oral tablet: Last Dose Taken:  , 2 tab(s) orally 2 times a day  · 	olmesartan 20 mg oral tablet: Last Dose Taken:  , 1 tab(s) orally once a day in pm  · 	montelukast 10 mg oral tablet: Last Dose Taken:  , 1 tab(s) orally once a day in pm  · 	Dilantin 100 mg oral capsule: Last Dose Taken:  , 2 cap(s) orally 2 times a day  · 	predniSONE 5 mg oral tablet: Last Dose Taken:  , 1 tab(s) orally once a day in am  · 	finasteride 5 mg oral tablet: Last Dose Taken:  , 1 tab(s) orally once a day in pm  · 	Centrum Silver oral tablet: Last Dose Taken:  , 1 tab(s) orally once a day  last dose 8/11  · 	Plavix 75 mg oral tablet: Last Dose Taken:  , 1 tab(s) orally once a day  last dose 8/10  · 	folic acid 0.4 mg oral tablet: 1 tab(s) orally once a day  · 	Vitamin B6 100 mg oral tablet: 1 tab(s) orally once a day    MEDICATIONS  (STANDING):  HYDROmorphone PCA (1 mG/mL) 30 milliLiter(s) PCA Continuous PCA Continuous  heparin  Injectable 5000 Unit(s) SubCutaneous every 8 hours  aspirin Suppository 300 milliGRAM(s) Rectal daily  phenytoin  IVPB 200 milliGRAM(s) IV Intermittent two times a day  dexamethasone  Injectable 1 milliGRAM(s) IV Push daily  pantoprazole  Injectable 40 milliGRAM(s) IV Push daily  chlorhexidine 4% Liquid 1 Application(s) Topical daily  dornase ziyad Solution 2.5 milliGRAM(s) Inhalation daily  ipratropium    for Nebulization 500 MICROGram(s) Nebulizer every 6 hours  metroNIDAZOLE  IVPB   IV Intermittent   metroNIDAZOLE  IVPB 500 milliGRAM(s) IV Intermittent every 8 hours  cefepime  IVPB   IV Intermittent   cefepime  IVPB 1000 milliGRAM(s) IV Intermittent every 12 hours  vancomycin  IVPB 1000 milliGRAM(s) IV Intermittent every 12 hours  dextrose 5% + sodium chloride 0.9% with potassium chloride 20 mEq/L 1000 milliLiter(s) (75 mL/Hr) IV Continuous <Continuous>  acetaminophen  IVPB. 1000 milliGRAM(s) IV Intermittent once  potassium chloride  10 mEq/100 mL IVPB 10 milliEquivalent(s) IV Intermittent every 1 hour  magnesium sulfate  IVPB 1 Gram(s) IV Intermittent once    MEDICATIONS  (PRN):  HYDROmorphone PCA (1 mG/mL) Rescue Clinician Bolus 0.5 milliGRAM(s) IV Push every 15 minutes PRN for Pain Scale GREATER THAN 6  naloxone Injectable 0.1 milliGRAM(s) IV Push every 3 minutes PRN For ANY of the following changes in patient status:  A. RR LESS THAN 10 breaths per minute, B. Oxygen saturation LESS THAN 90%, C. Sedation score of 6  ondansetron Injectable 4 milliGRAM(s) IV Push every 6 hours PRN Nausea  furosemide   Injectable 10 milliGRAM(s) IV Push once PRN for urine OP <20 ml/hr    ICU Vital Signs Last 24 Hrs  T(C): 37.3 (21 Aug 2017 00:00), Max: 37.3 (21 Aug 2017 00:00)  T(F): 99.2 (21 Aug 2017 00:00), Max: 99.2 (21 Aug 2017 00:00)  HR: 79 (21 Aug 2017 03:00) (78 - 94)  BP: 119/55 (21 Aug 2017 03:00) (95/49 - 134/55)  BP(mean): 71 (21 Aug 2017 03:00) (52 - 103)  ABP: --  ABP(mean): --  RR: 22 (21 Aug 2017 03:00) (15 - 26)  SpO2: 99% (21 Aug 2017 03:00) (93% - 100%)    Physical exam:                                                   General:               Pt is off sedation, on vent support                                                 Neuro:                  Off propofol                             Cardiovascular:     S1 & S2, regular                           Respiratory:         Air entry is fair and equal on both sides, has bilateral conducted sounds, bilateral subcutaneous emphysema                           GI:                        Soft, nondistended, no BS                            Ext:                      No cyanosis or edema  I&O's Summary    19 Aug 2017 07:01  -  20 Aug 2017 07:00  --------------------------------------------------------  IN: 2300 mL / OUT: 1890 mL / NET: 410 mL    20 Aug 2017 07:01  -  21 Aug 2017 03:47  --------------------------------------------------------  IN: 2870 mL / OUT: 1415 mL / NET: 1455 mL    Labs:                                                                         9.0    14.53 )-----------( 197      ( 21 Aug 2017 02:20 )             27.6                            08-21    139  |  102  |  16  ----------------------------<  101<H>  3.8   |  26  |  0.88    Ca    9.4      21 Aug 2017 02:20  Phos  3.1     08-21  Mg     1.9     08-21    TPro  5.4<L>  /  Alb  2.4<L>  /  TBili  1.5<H>  /  DBili  x   /  AST  76<H>  /  ALT  55<H>  /  AlkPhos  304<H>  08-20    LIVER FUNCTIONS - ( 20 Aug 2017 03:45 )  Alb: 2.4 g/dL / Pro: 5.4 g/dL / ALK PHOS: 304 u/L / ALT: 55 u/L / AST: 76 u/L / GGT: x            CT chest/abd/pelvis  082017    IMPRESSION:   Small bilateral pleural effusions with partial loculation on the right.   An air-fluid level is present within one of the right pleural fluid   loculations which may indicate abscess formation or a bronchopleural   fistula. Status post esophagectomy and gastric pull-through, which cannot   be evaluated for a leak due to absence of intraluminal contrast.  Marked diffuse subcutaneous emphysema extending from the chest into the   anterior pelvic wall. There is mild pneumomediastinum. Question of trace   right pneumothorax versus paraseptal emphysema. Right chest tube in place   with tip medially in the right apex                        Plan:  Plan:  82 yr old male with hx of cancer of esophagus s/p chemo and radiation therapy presents for preop evaluation.  Pt is now scheduled for Esophagogastroduodenoscopy, Thoracoscopic Mobilization of Stomach, Robotic Assisted Laparoscopic Esophagogastrectomy Feeding Jejunostomy, Tube, Possible Open on 07/14/17.  Of note upon physical exam for medical/cardiac clearance pt was sent for coronary ct valenzuela and was noted have blockage, pt then had angiogram and bare metal stents x 3 placed June 30, 2017.   POD # 7 ([id number]): Esophagogastrectomy  and J tube placement  for Malignant neoplasm of lower third of esophagus      Issues:  Elevated WBC  Gregg output  Elevated NGT output  Esophageal cancer  Asthma  Postop pain  CAD s/p stent               malnutrition               Hx: CAD- stents,    	esophageal cancer,   BPH                             Neuro:                                       Pain control with IV tylenol  Seizure disorder: On Dilantin IV 200bid                            Cardiovascular:                                        Continue hemodynamic monitoring.  Continue hemodynamic monitoring/ telemetry  ASA - suppository daily for CAD/ stentsCAD: Start ASA 300mg AR daily, No plavix yet                            Respiratory:   Pt is on       2     L nasal canula   Comfortable, no evidence of distress.  Pulmonary toilet                                      Monitor right Gregg  tube output ( to bulb suction)                                      Continue bronchodilators, pulmonary toilet  Monitor subcutaneous emphysema  Using incentive spirometry & doing  ~500-750  ml	  Head of bed elevation to 30-40 degrees  OOB, ambulation                            GI                                      Continue GI prophylaxis with Protonix                                      Continue Zofran / Reglan for nausea - PRN	                                      NGT & J-tube flushes Q4hrs  On J tube Jevity 50 cc / h diet, tolerating well (goal)  IV  Zofran / Reglan for nausea - PRN	  NPO  NGT to continuous suction  Barium swallow on Monday 8/21/17?                            Renal:                                      Monitor I/Os BUN/ Cr and electrolytes                                      Keep López until Proscar is restarted   BPH: OFF  Flomax/ Finasteride while still   NPO post surgery                                                 Hem/ Onc:                                                Monitor chest tube output &  signs of bleeding.                                          Follow CBC daily                                         Glycemic monitoring                                          F/S with coverage                           Infectious disease:                                           No signs of infection. Monitor for fever / leukocytosis.                                         All surgical incision / chest tube sites look clean  			   PanCulture  			   Starting empiric BS ABXs (3)                             Endocrine                                       	Continue Accu-Checks with coverage  			Glycemic monitoring  F/S with coverage  Pertinent clinical, laboratory, radiographic, hemodynamic, echocardiographic, respiratory data, microbiologic data and chart were reviewed and analyzed frequently throughout the course of the day and night. GI and DVT prophylaxis, glycemic control, head of bed elevation and skin care issues were addressed.  Patient seen, examined and plan discussed with CT Surgery CTICU team during rounds.    I have spent 45 minutes of critical care time with this pt between  12 am  and 7 am    Aleksandr Hayden MD

## 2017-08-21 NOTE — PROGRESS NOTE ADULT - SUBJECTIVE AND OBJECTIVE BOX
D Team Surgery  Daily Progress Note    24 Hour:  - Hypotensive yesterday and new diarrhea. New leukocytosis. Afebrile. CT scan did not show a leak, but could not evaluate gastroesophageal anastomosis.  - No increase in abdominal pain. Drains remain serous.   - Coughing with production of clear phlegm.     Vital Signs:  - T(C): 37.3 (21 Aug 2017 00:00)  - HR: 58 - 94  - BP: 95/49 - 129/70  - RR: 15 - 26  - SpO2: 94% - 100%    I&O's:  - Bulb: 370 mL  - Indwelling Catheter - Urethral: 1165 mL  - Nasoenteral Tube: 650 mL  - Total Net: 1775 mL    Labs:             9.0    14.53 )-----------( 197      ( 21 Aug 2017 02:20 )             27.6     139  |  102  |  16  ----------------------------<  101<H>  3.8   |  26  |  0.88    Ca    9.4        Phos  3.1       Mg     1.9         TPro  5.4<L>  /  Alb  2.4<L>  /  TBili  1.5  /  AST  76   /  ALT  55   /  AlkPhos  304 (8/20/17)                                                   TBili 0.1  /   AST 48   /   ALT 49   /   AlkPhos 191 (8/6/17)    CT Chest/Abd/Pelvis (8/20):   - Small bilateral pleural effusions with partial loculation on the right. An air-fluid level is present within one of the right pleural fluid loculations which may indicate abscess formation or a bronchopleural fistula.   - Status post esophagectomy and gastric pull-through, which cannot be evaluated for a leak due to absence of intraluminal contrast.  - Marked diffuse subcutaneous emphysema extending from the chest into the anterior pelvic wall. There is mild pneumomediastinum. Question of trace   right pneumothorax versus paraseptal emphysema. Right chest tube in place with tip medially in the right apex.  - There is no evidence of bowel obstruction or contrast extravasation. There is sigmoid diverticulosis without diverticulitis. No bowel obstruction.     Assessment: Robert De Luna is an 82 y.o. man with history of smoking (distant), seizure disorder (on phenytoin), and CVA who is POD 7 from [facility] esophagectomy for esophageal cancer (s/p chemo and radiation). Pain is well controlled and tolerating tube feeds. Now with flatus and diarrhea. Serous quality of drainage shows no indication of leaks. Cause of new leukocytosis and elevated bilirubin and alk phos is not known. Most likely from pleural effusions and potential right-sided pleural abscess vs fistula. No leak seen on scan, but it could not evaluate the patient's gastroesophageal anastomosis.    Plan:    - Prophylactic Antibiotics: cefepime, Flagyl, vanco  - Tube feeds at goal  - Chest PT D Team Surgery  Daily Progress Note    24 Hour:  - Hypotensive yesterday and new diarrhea. New leukocytosis. Afebrile. CT scan did not show a leak, but could not evaluate gastroesophageal anastomosis.  - No increase in abdominal pain. Drains remain serous.   - Coughing with production of clear phlegm.     Vital Signs:  - T(C): 37.3 (21 Aug 2017 00:00)  - HR: 58 - 94  - BP: 95/49 - 129/70  - RR: 15 - 26  - SpO2: 94% - 100%    I&O's:  - Bulb: 370 mL  - Indwelling Catheter - Urethral: 1165 mL  - Nasoenteral Tube: 650 mL  - Total Net: 1775 mL    Labs:             9.0    14.53 )-----------( 197      ( 21 Aug 2017 02:20 )             27.6     139  |  102  |  16  ----------------------------<  101<H>  3.8   |  26  |  0.88    Ca    9.4        Phos  3.1       Mg     1.9         TPro  5.4<L>  /  Alb  2.4<L>  /  TBili  1.5  /  AST  76   /  ALT  55   /  AlkPhos  304 (8/20/17)                                                   TBili 0.1  /   AST 48   /   ALT 49   /   AlkPhos 191 (8/6/17)    CT Chest/Abd/Pelvis (8/20):   - Small bilateral pleural effusions with partial loculation on the right. An air-fluid level is present within one of the right pleural fluid loculations which may indicate abscess formation or a bronchopleural fistula.   - Status post esophagectomy and gastric pull-through, which cannot be evaluated for a leak due to absence of intraluminal contrast.  - Marked diffuse subcutaneous emphysema extending from the chest into the anterior pelvic wall. There is mild pneumomediastinum. Question of trace   right pneumothorax versus paraseptal emphysema. Right chest tube in place with tip medially in the right apex.  - There is no evidence of bowel obstruction or contrast extravasation. There is sigmoid diverticulosis without diverticulitis. No bowel obstruction.     Assessment: Robert De Luna is an 82 y.o. man with history of smoking (distant), seizure disorder (on phenytoin), and CVA who is POD 7 from [facility] esophagectomy for esophageal cancer (s/p chemo and radiation). Pain is well controlled and tolerating tube feeds. Now with flatus and diarrhea. Serous quality of drainage shows no indication of leaks. Cause of new leukocytosis and elevated bilirubin and alk phos is not known. Most likely from pleural effusions and potential right-sided pleural abscess vs fistula. No leak seen on scan, but it could not evaluate the patient's gastroesophageal anastomosis.    Plan:    - Prophylactic Antibiotics: cefepime, Flagyl, vancomycin  - Consider thoracentesis vs pig tail catheter for pleural effusions given respiratory status  - Consider upper GI with contrast to evaluate surgical anastomosis for leak  - Tube feeds at goal  - Chest PT

## 2017-08-21 NOTE — PROGRESS NOTE ADULT - SUBJECTIVE AND OBJECTIVE BOX
s/p robotic-assisted laparoscopic [facility] esophagogastrectomy,    pt c/o intermittent cough +, diarrhea+    MEDICATIONS  (STANDING):  HYDROmorphone PCA (1 mG/mL) 30 milliLiter(s) PCA Continuous PCA Continuous  heparin  Injectable 5000 Unit(s) SubCutaneous every 8 hours  aspirin Suppository 300 milliGRAM(s) Rectal daily  phenytoin  IVPB 200 milliGRAM(s) IV Intermittent two times a day  dexamethasone  Injectable 1 milliGRAM(s) IV Push daily  pantoprazole  Injectable 40 milliGRAM(s) IV Push daily  chlorhexidine 4% Liquid 1 Application(s) Topical daily  dornase ziyad Solution 2.5 milliGRAM(s) Inhalation daily  metroNIDAZOLE  IVPB   IV Intermittent   metroNIDAZOLE  IVPB 500 milliGRAM(s) IV Intermittent every 8 hours  cefepime  IVPB   IV Intermittent   cefepime  IVPB 1000 milliGRAM(s) IV Intermittent every 12 hours  vancomycin  IVPB 1000 milliGRAM(s) IV Intermittent every 12 hours  dextrose 5% + sodium chloride 0.9% with potassium chloride 20 mEq/L 1000 milliLiter(s) (75 mL/Hr) IV Continuous <Continuous>  acetaminophen  IVPB. 1000 milliGRAM(s) IV Intermittent once  dornase ziyad Solution 2.5 milliGRAM(s) Inhalation daily  ALBUTerol/ipratropium for Nebulization 3 milliLiter(s) Nebulizer every 6 hours    MEDICATIONS  (PRN):  HYDROmorphone PCA (1 mG/mL) Rescue Clinician Bolus 0.5 milliGRAM(s) IV Push every 15 minutes PRN for Pain Scale GREATER THAN 6  naloxone Injectable 0.1 milliGRAM(s) IV Push every 3 minutes PRN For ANY of the following changes in patient status:  A. RR LESS THAN 10 breaths per minute, B. Oxygen saturation LESS THAN 90%, C. Sedation score of 6  ondansetron Injectable 4 milliGRAM(s) IV Push every 6 hours PRN Nausea  furosemide   Injectable 10 milliGRAM(s) IV Push once PRN for urine OP <20 ml/hr      Vital Signs Last 24 Hrs  T(C): 36.7 (21 Aug 2017 12:00), Max: 37.3 (21 Aug 2017 00:00)  T(F): 98 (21 Aug 2017 12:00), Max: 99.2 (21 Aug 2017 00:00)  HR: 81 (21 Aug 2017 13:00) (58 - 94)  BP: 130/61 (21 Aug 2017 13:00) (101/58 - 135/74)  BP(mean): 76 (21 Aug 2017 13:00) (59 - 104)  RR: 27 (21 Aug 2017 13:00) (16 - 27)  SpO2: 91% (21 Aug 2017 13:00) (91% - 100%)      Constitutional: No fever, fatigue  Skin: No rash.  Eyes: No recent vision problems or eye pain.  ENT: No congestion, ear pain, or sore throat.  Cardiovascular: No chest pain or palpation.  Respiratory: No cough, shortness of breath, congestion, or wheezing.  Gastrointestinal: No abdominal pain, nausea, vomiting, or diarrhea.  Genitourinary: No dysuria.  Musculoskeletal: No joint swelling.  Neurologic: No headache.    HEENT :peerla, eomi  CVS : s1, s2+  RS :dec breath sounds at bases  ABD : soft, bs+  EXT :no edema  NEURO :alert awake oriented    LABS:      139  |  102  |  16  ----------------------------<  101<H>  3.8   |  26  |  0.88    Ca    9.4      21 Aug 2017 02:20  Phos  3.1       Mg     1.9         TPro  5.4<L>  /  Alb  2.4<L>  /  TBili  1.5<H>  /  DBili      /  AST  76<H>  /  ALT  55<H>  /  AlkPhos  304<H>      Creatinine Trend: 0.88 <--, 0.95 <--, 0.88 <--, 0.78 <--, 0.82 <--, 0.82 <--, 0.79 <--, 0.88 <--, 1.07 <--, 1.04 <--, 1.05 <--                        9.0    14.53 )-----------( 197      ( 21 Aug 2017 02:20 )             27.6     Urine Studies:  Urinalysis Basic - ( 20 Aug 2017 14:40 )    Color: YELLOW / Appearance: HAZY / S.022 / pH: 7.5  Gluc: NEGATIVE / Ketone: NEGATIVE  / Bili: SMALL / Urobili: 8 E.U.   Blood: LARGE / Protein: 100 / Nitrite: NEGATIVE   Leuk Esterase: SMALL / RBC: >50 / WBC 10-25   Sq Epi:  / Non Sq Epi:  / Bacteria: FEW              LIVER FUNCTIONS - ( 20 Aug 2017 03:45 )  Alb: 2.4 g/dL / Pro: 5.4 g/dL / ALK PHOS: 304 u/L / ALT: 55 u/L / AST: 76 u/L / GGT: x                       LIVER FUNCTIONS - ( 20 Aug 2017 03:45 )  Alb: 2.4 g/dL / Pro: 5.4 g/dL / ALK PHOS: 304 u/L / ALT: 55 u/L / AST: 76 u/L / GGT: x

## 2017-08-21 NOTE — PROGRESS NOTE ADULT - SUBJECTIVE AND OBJECTIVE BOX
Anesthesia Pain Management Service    SUBJECTIVE: Patient is doing well with IV PCA and no significant problems reported.    Pain Scale Score	At rest: ___ 	With Activity: ___ 	[X ] Refer to charted pain scores    THERAPY:    [ ] IV PCA Morphine		[ ] 5 mg/mL	[ ] 1 mg/mL  [X ] IV PCA Hydromorphone	[ ] 5 mg/mL	[X ] 1 mg/mL  [ ] IV PCA Fentanyl		[ ] 50 micrograms/mL    Demand dose __0.2_ lockout __6_ (minutes) Continuous Rate _0__ Total: __0_  mg used (in past 24 hours)      MEDICATIONS  (STANDING):  HYDROmorphone PCA (1 mG/mL) 30 milliLiter(s) PCA Continuous PCA Continuous  heparin  Injectable 5000 Unit(s) SubCutaneous every 8 hours  aspirin Suppository 300 milliGRAM(s) Rectal daily  phenytoin  IVPB 200 milliGRAM(s) IV Intermittent two times a day  dexamethasone  Injectable 1 milliGRAM(s) IV Push daily  pantoprazole  Injectable 40 milliGRAM(s) IV Push daily  chlorhexidine 4% Liquid 1 Application(s) Topical daily  dornase ziyad Solution 2.5 milliGRAM(s) Inhalation daily  metroNIDAZOLE  IVPB   IV Intermittent   metroNIDAZOLE  IVPB 500 milliGRAM(s) IV Intermittent every 8 hours  cefepime  IVPB   IV Intermittent   cefepime  IVPB 1000 milliGRAM(s) IV Intermittent every 12 hours  vancomycin  IVPB 1000 milliGRAM(s) IV Intermittent every 12 hours  dextrose 5% + sodium chloride 0.9% with potassium chloride 20 mEq/L 1000 milliLiter(s) (75 mL/Hr) IV Continuous <Continuous>  acetaminophen  IVPB. 1000 milliGRAM(s) IV Intermittent once  dornase ziyad Solution 2.5 milliGRAM(s) Inhalation daily  ALBUTerol/ipratropium for Nebulization 3 milliLiter(s) Nebulizer every 6 hours    MEDICATIONS  (PRN):  HYDROmorphone PCA (1 mG/mL) Rescue Clinician Bolus 0.5 milliGRAM(s) IV Push every 15 minutes PRN for Pain Scale GREATER THAN 6  naloxone Injectable 0.1 milliGRAM(s) IV Push every 3 minutes PRN For ANY of the following changes in patient status:  A. RR LESS THAN 10 breaths per minute, B. Oxygen saturation LESS THAN 90%, C. Sedation score of 6  ondansetron Injectable 4 milliGRAM(s) IV Push every 6 hours PRN Nausea  furosemide   Injectable 10 milliGRAM(s) IV Push once PRN for urine OP <20 ml/hr      OBJECTIVE:    Sedation Score:	[ X] Alert	[ ] Drowsy 	[ ] Arousable	[ ] Asleep	[ ] Unresponsive    Side Effects:	[X ] None	[ ] Nausea	[ ] Vomiting	[ ] Pruritus  		[ ] Other:    Vital Signs Last 24 Hrs  T(C): 36.6 (21 Aug 2017 08:00), Max: 37.3 (21 Aug 2017 00:00)  T(F): 97.8 (21 Aug 2017 08:00), Max: 99.2 (21 Aug 2017 00:00)  HR: 76 (21 Aug 2017 08:00) (58 - 94)  BP: 130/71 (21 Aug 2017 08:00) (99/41 - 130/71)  BP(mean): 86 (21 Aug 2017 08:00) (52 - 86)  RR: 26 (21 Aug 2017 08:00) (15 - 26)  SpO2: 99% (21 Aug 2017 08:00) (94% - 100%)    ASSESSMENT/ PLAN    Therapy to  be:	[ X] Continue   [ ] Discontinued   [ ] Change to prn Analgesics    Documentation and Verification of current medications:   [X] Done	[ ] Not done, not elligible    Comments: Barium swallow to be done today.     Patient was seen 08-21-17 @ 08:30

## 2017-08-21 NOTE — PROGRESS NOTE ADULT - SUBJECTIVE AND OBJECTIVE BOX
MEJIA LATIF:[id number],   82yMale followed for: diarrhea  penicillin (Rash)    PAST MEDICAL & SURGICAL HISTORY:  Coronary artery disease: s/p 3 stents on June 30, 2017 at [place] Dr. Lavelle Reid  OA (osteoarthritis) of knee: right  Former smoker, stopped smoking in distant past  Former smoker  Rheumatoid arthritis  Seizure disorder: 1 episode approximately 15 yrs ago  Asthma  Hyperlipidemia  BPH (benign prostatic hyperplasia)  HTN (hypertension)  Esophagus cancer  Chronic allergic rhinitis  BCC (basal cell carcinoma): skin  Cerebrovascular accident (CVA)  Anxiety  History of tonsillectomy  H/O heart artery stent: June 30, 2017  Knee arthropathy: left  History of total hip replacement: left  History of hernia repair    FAMILY HISTORY:  Family history of heart attack (Sibling)  Family history of pulmonary embolism (Father): father @ 49 yr old  FH: CAD (coronary artery disease) (Mother)    MEDICATIONS  (STANDING):  HYDROmorphone PCA (1 mG/mL) 30 milliLiter(s) PCA Continuous PCA Continuous  heparin  Injectable 5000 Unit(s) SubCutaneous every 8 hours  aspirin Suppository 300 milliGRAM(s) Rectal daily  phenytoin  IVPB 200 milliGRAM(s) IV Intermittent two times a day  dexamethasone  Injectable 1 milliGRAM(s) IV Push daily  pantoprazole  Injectable 40 milliGRAM(s) IV Push daily  chlorhexidine 4% Liquid 1 Application(s) Topical daily  dornase ziyad Solution 2.5 milliGRAM(s) Inhalation daily  metroNIDAZOLE  IVPB   IV Intermittent   metroNIDAZOLE  IVPB 500 milliGRAM(s) IV Intermittent every 8 hours  cefepime  IVPB   IV Intermittent   cefepime  IVPB 1000 milliGRAM(s) IV Intermittent every 12 hours  vancomycin  IVPB 1000 milliGRAM(s) IV Intermittent every 12 hours  dextrose 5% + sodium chloride 0.9% with potassium chloride 20 mEq/L 1000 milliLiter(s) (75 mL/Hr) IV Continuous <Continuous>  acetaminophen  IVPB. 1000 milliGRAM(s) IV Intermittent once  dornase ziyad Solution 2.5 milliGRAM(s) Inhalation daily  ALBUTerol/ipratropium for Nebulization 3 milliLiter(s) Nebulizer every 6 hours    MEDICATIONS  (PRN):  HYDROmorphone PCA (1 mG/mL) Rescue Clinician Bolus 0.5 milliGRAM(s) IV Push every 15 minutes PRN for Pain Scale GREATER THAN 6  naloxone Injectable 0.1 milliGRAM(s) IV Push every 3 minutes PRN For ANY of the following changes in patient status:  A. RR LESS THAN 10 breaths per minute, B. Oxygen saturation LESS THAN 90%, C. Sedation score of 6  ondansetron Injectable 4 milliGRAM(s) IV Push every 6 hours PRN Nausea  furosemide   Injectable 10 milliGRAM(s) IV Push once PRN for urine OP <20 ml/hr      Vital Signs Last 24 Hrs  T(C): 36.6 (21 Aug 2017 08:00), Max: 37.3 (21 Aug 2017 00:00)  T(F): 97.8 (21 Aug 2017 08:00), Max: 99.2 (21 Aug 2017 00:00)  HR: 77 (21 Aug 2017 07:00) (58 - 94)  BP: 124/58 (21 Aug 2017 07:00) (99/41 - 129/70)  BP(mean): 74 (21 Aug 2017 07:00) (52 - 82)  RR: 23 (21 Aug 2017 07:00) (15 - 26)  SpO2: 98% (21 Aug 2017 07:00) (94% - 100%)  nc/at  s1s2  cta  soft, nt, nd no guarding or rebound  no c/c/e    CBC Full  -  ( 21 Aug 2017 02:20 )  WBC Count : 14.53 K/uL  Hemoglobin : 9.0 g/dL  Hematocrit : 27.6 %  Platelet Count - Automated : 197 K/uL  Mean Cell Volume : 96.5 fL  Mean Cell Hemoglobin : 31.5 pg  Mean Cell Hemoglobin Concentration : 32.6 %  Auto Neutrophil # : 12.10 K/uL  Auto Lymphocyte # : 0.76 K/uL  Auto Monocyte # : 1.26 K/uL  Auto Eosinophil # : 0.31 K/uL  Auto Basophil # : 0.02 K/uL  Auto Neutrophil % : 83.3 %  Auto Lymphocyte % : 5.2 %  Auto Monocyte % : 8.7 %  Auto Eosinophil % : 2.1 %  Auto Basophil % : 0.1 %    08-21    139  |  102  |  16  ----------------------------<  101<H>  3.8   |  26  |  0.88    Ca    9.4      21 Aug 2017 02:20  Phos  3.1     08-21  Mg     1.9     08-21    TPro  5.4<L>  /  Alb  2.4<L>  /  TBili  1.5<H>  /  DBili  x   /  AST  76<H>  /  ALT  55<H>  /  AlkPhos  304<H>  08-20

## 2017-08-21 NOTE — PROGRESS NOTE ADULT - ASSESSMENT
diarrhea can be osmotic from feeds.  await stool studies.  if osmotic can benefit tincture opium small dose .5 bid. will defer.  await barrium

## 2017-08-21 NOTE — PROGRESS NOTE ADULT - ASSESSMENT
s/p [place] [facility] esophagectomy for esophageal cancer     - - Prophylactic Antibiotics: cefepime, Flagyl, vancomycin  - Consider thoracentesis vs pig tail catheter for pleural effusions given respiratory status  - Consider upper GI with contrast to evaluate surgical anastomosis for leak  - Tube feeds at goal  - Chest PT    Diarrhea - as per GI   diarrhea can be osmotic from feeds.  await stool studies.  if osmotic can benefit tincture opium small dose .5 bid. will defer.  await barrium

## 2017-08-22 LAB
BACTERIA UR CULT: SIGNIFICANT CHANGE UP
BUN SERPL-MCNC: 16 MG/DL — SIGNIFICANT CHANGE UP (ref 7–23)
CALCIUM SERPL-MCNC: 8.9 MG/DL — SIGNIFICANT CHANGE UP (ref 8.4–10.5)
CHLORIDE SERPL-SCNC: 107 MMOL/L — SIGNIFICANT CHANGE UP (ref 98–107)
CO2 SERPL-SCNC: 23 MMOL/L — SIGNIFICANT CHANGE UP (ref 22–31)
CREAT SERPL-MCNC: 0.9 MG/DL — SIGNIFICANT CHANGE UP (ref 0.5–1.3)
GLUCOSE SERPL-MCNC: 132 MG/DL — HIGH (ref 70–99)
HCT VFR BLD CALC: 24.4 % — LOW (ref 39–50)
HGB BLD-MCNC: 7.9 G/DL — LOW (ref 13–17)
MAGNESIUM SERPL-MCNC: 2 MG/DL — SIGNIFICANT CHANGE UP (ref 1.6–2.6)
MCHC RBC-ENTMCNC: 31.6 PG — SIGNIFICANT CHANGE UP (ref 27–34)
MCHC RBC-ENTMCNC: 32.4 % — SIGNIFICANT CHANGE UP (ref 32–36)
MCV RBC AUTO: 97.6 FL — SIGNIFICANT CHANGE UP (ref 80–100)
NRBC # FLD: 0 — SIGNIFICANT CHANGE UP
PHOSPHATE SERPL-MCNC: 3.1 MG/DL — SIGNIFICANT CHANGE UP (ref 2.5–4.5)
PLATELET # BLD AUTO: 177 K/UL — SIGNIFICANT CHANGE UP (ref 150–400)
PMV BLD: 9.7 FL — SIGNIFICANT CHANGE UP (ref 7–13)
POTASSIUM SERPL-MCNC: 3.7 MMOL/L — SIGNIFICANT CHANGE UP (ref 3.5–5.3)
POTASSIUM SERPL-SCNC: 3.7 MMOL/L — SIGNIFICANT CHANGE UP (ref 3.5–5.3)
RBC # BLD: 2.5 M/UL — LOW (ref 4.2–5.8)
RBC # FLD: 13.2 % — SIGNIFICANT CHANGE UP (ref 10.3–14.5)
SODIUM SERPL-SCNC: 142 MMOL/L — SIGNIFICANT CHANGE UP (ref 135–145)
VANCOMYCIN TROUGH SERPL-MCNC: 17.7 UG/ML — SIGNIFICANT CHANGE UP (ref 10–20)
WBC # BLD: 10.21 K/UL — SIGNIFICANT CHANGE UP (ref 3.8–10.5)
WBC # FLD AUTO: 10.21 K/UL — SIGNIFICANT CHANGE UP (ref 3.8–10.5)

## 2017-08-22 PROCEDURE — 71010: CPT | Mod: 26

## 2017-08-22 PROCEDURE — 99233 SBSQ HOSP IP/OBS HIGH 50: CPT

## 2017-08-22 RX ORDER — ACETAMINOPHEN 500 MG
1000 TABLET ORAL ONCE
Qty: 0 | Refills: 0 | Status: COMPLETED | OUTPATIENT
Start: 2017-08-22 | End: 2017-08-24

## 2017-08-22 RX ORDER — POTASSIUM CHLORIDE 20 MEQ
40 PACKET (EA) ORAL ONCE
Qty: 0 | Refills: 0 | Status: COMPLETED | OUTPATIENT
Start: 2017-08-22 | End: 2017-08-22

## 2017-08-22 RX ORDER — IPRATROPIUM BROMIDE 0.2 MG/ML
500 SOLUTION, NON-ORAL INHALATION EVERY 6 HOURS
Qty: 0 | Refills: 0 | Status: DISCONTINUED | OUTPATIENT
Start: 2017-08-22 | End: 2017-08-26

## 2017-08-22 RX ORDER — FUROSEMIDE 40 MG
10 TABLET ORAL
Qty: 0 | Refills: 0 | Status: DISCONTINUED | OUTPATIENT
Start: 2017-08-22 | End: 2017-08-27

## 2017-08-22 RX ORDER — ACETAMINOPHEN 500 MG
1000 TABLET ORAL ONCE
Qty: 0 | Refills: 0 | Status: COMPLETED | OUTPATIENT
Start: 2017-08-22 | End: 2017-08-22

## 2017-08-22 RX ORDER — ALBUTEROL 90 UG/1
2 AEROSOL, METERED ORAL EVERY 4 HOURS
Qty: 0 | Refills: 0 | Status: DISCONTINUED | OUTPATIENT
Start: 2017-08-22 | End: 2017-08-26

## 2017-08-22 RX ORDER — FUROSEMIDE 40 MG
20 TABLET ORAL ONCE
Qty: 0 | Refills: 0 | Status: COMPLETED | OUTPATIENT
Start: 2017-08-22 | End: 2017-08-22

## 2017-08-22 RX ORDER — FUROSEMIDE 40 MG
10 TABLET ORAL ONCE
Qty: 0 | Refills: 0 | Status: COMPLETED | OUTPATIENT
Start: 2017-08-22 | End: 2017-08-22

## 2017-08-22 RX ADMIN — Medication 20 MILLIGRAM(S): at 06:37

## 2017-08-22 RX ADMIN — Medication 3 MILLILITER(S): at 10:03

## 2017-08-22 RX ADMIN — CHLORHEXIDINE GLUCONATE 1 APPLICATION(S): 213 SOLUTION TOPICAL at 11:16

## 2017-08-22 RX ADMIN — Medication 250 MILLIGRAM(S): at 06:29

## 2017-08-22 RX ADMIN — Medication 10 MILLIGRAM(S): at 22:02

## 2017-08-22 RX ADMIN — Medication 108 MILLIGRAM(S): at 05:52

## 2017-08-22 RX ADMIN — Medication 300 MILLIGRAM(S): at 13:11

## 2017-08-22 RX ADMIN — Medication 500 MICROGRAM(S): at 22:16

## 2017-08-22 RX ADMIN — Medication 108 MILLIGRAM(S): at 18:01

## 2017-08-22 RX ADMIN — Medication 1000 MILLIGRAM(S): at 07:00

## 2017-08-22 RX ADMIN — Medication 400 MILLIGRAM(S): at 06:45

## 2017-08-22 RX ADMIN — DEXTROSE MONOHYDRATE, SODIUM CHLORIDE, AND POTASSIUM CHLORIDE 30 MILLILITER(S): 50; .745; 4.5 INJECTION, SOLUTION INTRAVENOUS at 10:26

## 2017-08-22 RX ADMIN — HEPARIN SODIUM 5000 UNIT(S): 5000 INJECTION INTRAVENOUS; SUBCUTANEOUS at 05:52

## 2017-08-22 RX ADMIN — Medication 10 MILLIGRAM(S): at 00:41

## 2017-08-22 RX ADMIN — HEPARIN SODIUM 5000 UNIT(S): 5000 INJECTION INTRAVENOUS; SUBCUTANEOUS at 22:02

## 2017-08-22 RX ADMIN — Medication 40 MILLIEQUIVALENT(S): at 06:37

## 2017-08-22 RX ADMIN — Medication 400 MILLIGRAM(S): at 19:30

## 2017-08-22 RX ADMIN — CEFEPIME 100 MILLIGRAM(S): 1 INJECTION, POWDER, FOR SOLUTION INTRAMUSCULAR; INTRAVENOUS at 18:01

## 2017-08-22 RX ADMIN — Medication 100 MILLIGRAM(S): at 22:02

## 2017-08-22 RX ADMIN — Medication 1 MILLIGRAM(S): at 05:06

## 2017-08-22 RX ADMIN — DEXTROSE MONOHYDRATE, SODIUM CHLORIDE, AND POTASSIUM CHLORIDE 30 MILLILITER(S): 50; .745; 4.5 INJECTION, SOLUTION INTRAVENOUS at 22:02

## 2017-08-22 RX ADMIN — Medication 100 MILLIGRAM(S): at 13:18

## 2017-08-22 RX ADMIN — Medication 250 MILLIGRAM(S): at 17:10

## 2017-08-22 RX ADMIN — Medication 10 MILLIGRAM(S): at 05:07

## 2017-08-22 RX ADMIN — CEFEPIME 100 MILLIGRAM(S): 1 INJECTION, POWDER, FOR SOLUTION INTRAMUSCULAR; INTRAVENOUS at 05:08

## 2017-08-22 RX ADMIN — Medication 100 MILLIGRAM(S): at 05:16

## 2017-08-22 RX ADMIN — Medication 500 MICROGRAM(S): at 16:17

## 2017-08-22 RX ADMIN — Medication 10 MILLIGRAM(S): at 11:15

## 2017-08-22 RX ADMIN — PANTOPRAZOLE SODIUM 40 MILLIGRAM(S): 20 TABLET, DELAYED RELEASE ORAL at 13:11

## 2017-08-22 RX ADMIN — Medication 3 MILLILITER(S): at 03:29

## 2017-08-22 RX ADMIN — Medication 1000 MILLIGRAM(S): at 19:45

## 2017-08-22 RX ADMIN — HEPARIN SODIUM 5000 UNIT(S): 5000 INJECTION INTRAVENOUS; SUBCUTANEOUS at 13:18

## 2017-08-22 RX ADMIN — DORNASE ALFA 2.5 MILLIGRAM(S): 1 SOLUTION RESPIRATORY (INHALATION) at 10:04

## 2017-08-22 NOTE — PROGRESS NOTE ADULT - SUBJECTIVE AND OBJECTIVE BOX
MEJIA LATIF:[id number],   82yMale followed for:  penicillin (Rash)    PAST MEDICAL & SURGICAL HISTORY:  Coronary artery disease: s/p 3 stents on June 30, 2017 at [place] Dr. Lavelle Reid  OA (osteoarthritis) of knee: right  Former smoker, stopped smoking in distant past  Former smoker  Rheumatoid arthritis  Seizure disorder: 1 episode approximately 15 yrs ago  Asthma  Hyperlipidemia  BPH (benign prostatic hyperplasia)  HTN (hypertension)  Esophagus cancer  Chronic allergic rhinitis  BCC (basal cell carcinoma): skin  Cerebrovascular accident (CVA)  Anxiety  History of tonsillectomy  H/O heart artery stent: June 30, 2017  Knee arthropathy: left  History of total hip replacement: left  History of hernia repair    FAMILY HISTORY:  Family history of heart attack (Sibling)  Family history of pulmonary embolism (Father): father @ 49 yr old  FH: CAD (coronary artery disease) (Mother)    MEDICATIONS  (STANDING):  heparin  Injectable 5000 Unit(s) SubCutaneous every 8 hours  aspirin Suppository 300 milliGRAM(s) Rectal daily  phenytoin  IVPB 200 milliGRAM(s) IV Intermittent two times a day  dexamethasone  Injectable 1 milliGRAM(s) IV Push daily  pantoprazole  Injectable 40 milliGRAM(s) IV Push daily  chlorhexidine 4% Liquid 1 Application(s) Topical daily  dornase ziyad Solution 2.5 milliGRAM(s) Inhalation daily  metroNIDAZOLE  IVPB   IV Intermittent   metroNIDAZOLE  IVPB 500 milliGRAM(s) IV Intermittent every 8 hours  cefepime  IVPB   IV Intermittent   cefepime  IVPB 1000 milliGRAM(s) IV Intermittent every 12 hours  vancomycin  IVPB 1000 milliGRAM(s) IV Intermittent every 12 hours  dextrose 5% + sodium chloride 0.9% with potassium chloride 20 mEq/L 1000 milliLiter(s) (30 mL/Hr) IV Continuous <Continuous>  dornase ziyad Solution 2.5 milliGRAM(s) Inhalation daily  ALBUTerol/ipratropium for Nebulization 3 milliLiter(s) Nebulizer every 6 hours    MEDICATIONS  (PRN):  naloxone Injectable 0.1 milliGRAM(s) IV Push every 3 minutes PRN For ANY of the following changes in patient status:  A. RR LESS THAN 10 breaths per minute, B. Oxygen saturation LESS THAN 90%, C. Sedation score of 6  ondansetron Injectable 4 milliGRAM(s) IV Push every 6 hours PRN Nausea  furosemide   Injectable 10 milliGRAM(s) IV Push once PRN for urine OP <20 ml/hr      Vital Signs Last 24 Hrs  T(C): 36.7 (22 Aug 2017 04:00), Max: 36.8 (22 Aug 2017 00:00)  T(F): 98 (22 Aug 2017 04:00), Max: 98.3 (22 Aug 2017 00:00)  HR: 74 (22 Aug 2017 07:00) (64 - 90)  BP: 107/48 (22 Aug 2017 07:00) (89/42 - 135/74)  BP(mean): 63 (22 Aug 2017 07:00) (52 - 104)  RR: 20 (22 Aug 2017 07:00) (14 - 27)  SpO2: 99% (22 Aug 2017 07:00) (91% - 100%)  nc/at  s1s2  cta  soft, nt, nd no guarding or rebound  no c/c/e    CBC Full  -  ( 22 Aug 2017 04:40 )  WBC Count : 10.21 K/uL  Hemoglobin : 7.9 g/dL  Hematocrit : 24.4 %  Platelet Count - Automated : 177 K/uL  Mean Cell Volume : 97.6 fL  Mean Cell Hemoglobin : 31.6 pg  Mean Cell Hemoglobin Concentration : 32.4 %  Auto Neutrophil # : x  Auto Lymphocyte # : x  Auto Monocyte # : x  Auto Eosinophil # : x  Auto Basophil # : x  Auto Neutrophil % : x  Auto Lymphocyte % : x  Auto Monocyte % : x  Auto Eosinophil % : x  Auto Basophil % : x    08-22    142  |  107  |  16  ----------------------------<  132<H>  3.7   |  23  |  0.90    Ca    8.9      22 Aug 2017 04:40  Phos  3.1     08-22  Mg     2.0     08-22

## 2017-08-22 NOTE — PROGRESS NOTE ADULT - ASSESSMENT
s/p [place] [facility] esophagectomy for esophageal cancer     - - Prophylactic Antibiotics: cefepime, Flagyl, vancomycin  - Consider thoracentesis vs pig tail catheter for pleural effusions given respiratory status  - Esophagram demonstrates contained leak.- Tube feeds at goal, npo  - Chest PT

## 2017-08-22 NOTE — PROGRESS NOTE ADULT - SUBJECTIVE AND OBJECTIVE BOX
D Team Surgery Progress Note    STATUS POST:  amryjane logan with feeding jejunostomy    POST OPERATIVE DAY #: 8    Vital Signs Last 24 Hrs  T(C): 36.8 (22 Aug 2017 12:00), Max: 36.8 (22 Aug 2017 00:00)  T(F): 98.3 (22 Aug 2017 12:00), Max: 98.3 (22 Aug 2017 00:00)  HR: 73 (22 Aug 2017 13:00) (64 - 90)  BP: 123/55 (22 Aug 2017 13:00) (89/42 - 129/50)  BP(mean): 72 (22 Aug 2017 13:00) (52 - 79)  RR: 16 (22 Aug 2017 13:00) (14 - 24)  SpO2: 97% (22 Aug 2017 13:00) (94% - 100%)      SUBJECTIVE: Pt OOBTC, no acute complaints at this time.  Tolerating TF, no N/V.      OBJECTIVE: T(C): 36.8 (22 Aug 2017 12:00), Max: 36.8 (22 Aug 2017 00:00)  HR: 73 (22 Aug 2017 13:00) (64 - 90)  BP: 123/55 (22 Aug 2017 13:00) (89/42 - 129/50)  RR: 16 (22 Aug 2017 13:00) (14 - 24)  SpO2: 97% (22 Aug 2017 13:00) (94% - 100%)  Wt(kg): --  I&O's Summary    21 Aug 2017 07:  -  22 Aug 2017 07:00  --------------------------------------------------------  IN: 3515 mL / OUT: 1440 mL / NET: 2075 mL    22 Aug 2017 07:  -  22 Aug 2017 14:12  --------------------------------------------------------  IN: 680 mL / OUT: 495 mL / NET: 185 mL      I&O's Detail    21 Aug 2017 07:  -  22 Aug 2017 07:00  --------------------------------------------------------  IN:    dextrose 5% + sodium chloride 0.9% with potassium chloride 20 mEq/L: 1755 mL    Enteral Tube Flush: 200 mL    IV PiggyBack: 1200 mL    Jevity: 360 mL  Total IN: 3515 mL    OUT:    Bulb: 80 mL    Indwelling Catheter - Urethral: 1260 mL    Nasoenteral Tube: 100 mL  Total OUT: 1440 mL    Total NET: 2075 mL      22 Aug 2017 07:  -  22 Aug 2017 14:12  --------------------------------------------------------  IN:    dextrose 5% + sodium chloride 0.9% with potassium chloride 20 mEq/L: 210 mL    Enteral Tube Flush: 40 mL    IV PiggyBack: 100 mL    Jevity: 330 mL  Total IN: 680 mL    OUT:    Bulb: 25 mL    Indwelling Catheter - Urethral: 470 mL  Total OUT: 495 mL    Total NET: 185 mL        General: NAD  Abdomen: soft, NT, ND, J tube intact, GINA drain without output    MEDICATIONS  (STANDING):  heparin  Injectable 5000 Unit(s) SubCutaneous every 8 hours  aspirin Suppository 300 milliGRAM(s) Rectal daily  phenytoin  IVPB 200 milliGRAM(s) IV Intermittent two times a day  dexamethasone  Injectable 1 milliGRAM(s) IV Push daily  pantoprazole  Injectable 40 milliGRAM(s) IV Push daily  chlorhexidine 4% Liquid 1 Application(s) Topical daily  dornase ziyad Solution 2.5 milliGRAM(s) Inhalation daily  metroNIDAZOLE  IVPB   IV Intermittent   metroNIDAZOLE  IVPB 500 milliGRAM(s) IV Intermittent every 8 hours  cefepime  IVPB   IV Intermittent   cefepime  IVPB 1000 milliGRAM(s) IV Intermittent every 12 hours  vancomycin  IVPB 1000 milliGRAM(s) IV Intermittent every 12 hours  dextrose 5% + sodium chloride 0.9% with potassium chloride 20 mEq/L 1000 milliLiter(s) (30 mL/Hr) IV Continuous <Continuous>  dornase ziyad Solution 2.5 milliGRAM(s) Inhalation daily  furosemide   Injectable 10 milliGRAM(s) IV Push two times a day  ipratropium    for Nebulization 500 MICROGram(s) Nebulizer every 6 hours    MEDICATIONS  (PRN):  naloxone Injectable 0.1 milliGRAM(s) IV Push every 3 minutes PRN For ANY of the following changes in patient status:  A. RR LESS THAN 10 breaths per minute, B. Oxygen saturation LESS THAN 90%, C. Sedation score of 6  ondansetron Injectable 4 milliGRAM(s) IV Push every 6 hours PRN Nausea  ALBUTerol    90 MICROgram(s) HFA Inhaler 2 Puff(s) Inhalation every 4 hours PRN Shortness of Breath      LABS:                        7.9    10.21 )-----------( 177      ( 22 Aug 2017 04:40 )             24.4     08-22    142  |  107  |  16  ----------------------------<  132<H>  3.7   |  23  |  0.90    Ca    8.9      22 Aug 2017 04:40  Phos  3.1     -  Mg     2.0             Urinalysis Basic - ( 20 Aug 2017 14:40 )    Color: YELLOW / Appearance: HAZY / S.022 / pH: 7.5  Gluc: NEGATIVE / Ketone: NEGATIVE  / Bili: SMALL / Urobili: 8 E.U.   Blood: LARGE / Protein: 100 / Nitrite: NEGATIVE   Leuk Esterase: SMALL / RBC: >50 / WBC 10-25   Sq Epi: x / Non Sq Epi: x / Bacteria: FEW        RADIOLOGY & ADDITIONAL STUDIES:    Assessment/Plan: 82yMale s/p Maryjane Newman w/ feeding J-tube  -cont TF  -cont abx  -pain control  -VTE ppx

## 2017-08-22 NOTE — PROGRESS NOTE ADULT - SUBJECTIVE AND OBJECTIVE BOX
MEJIA LATIF          MRN-[id number]    HPI:  82 yr old male with hx of cancer of esophagus s/p chemo and radiation therapy presents for preop evaluation.  Pt is now scheduled for Esophagogastroduodenoscopy, Thoracoscopic Mobilization of Stomach, Robotic Assisted Laparoscopic Esophagogastrectomy Feeding Jejunostomy, Tube, Possible Open on 07/14/17.  Of note upon physical exam for medical/cardiac clearance pt was sent for coronary ct valenzuela and was noted have blockage, pt then had angiogram and bare metal stents x 3 placed June 30, 2017. (11 Aug 2017 09:35)      Procedure:  POD # :     Issues:        Interval/Overnight Events/ ROS  Pt remained hemodynamically stable overnight, not on any pressors or inotropes. OOB to chair, breathing comfortably with minimal pain. Ambulated several times . Denies pain, no SOB, no palpitations, no nausea/ no vomiting, no dizziness  A-line and garza d/dylan         PAST MEDICAL & SURGICAL HISTORY:  Coronary artery disease: s/p 3 stents on June 30, 2017 at [place] Dr. Lavelle Reid  OA (osteoarthritis) of knee: right  Former smoker, stopped smoking in distant past  Former smoker  Rheumatoid arthritis  Seizure disorder: 1 episode approximately 15 yrs ago  Asthma  Hyperlipidemia  BPH (benign prostatic hyperplasia)  HTN (hypertension)  Esophagus cancer  Chronic allergic rhinitis  BCC (basal cell carcinoma): skin  Cerebrovascular accident (CVA)  Anxiety  History of tonsillectomy  H/O heart artery stent: June 30, 2017  Knee arthropathy: left  History of total hip replacement: left  History of hernia repair    Allergies    penicillin (Rash)    Intolerances            ***VITAL SIGNS:  Vital Signs Last 24 Hrs  T(C): 36.3 (22 Aug 2017 16:00), Max: 36.8 (22 Aug 2017 00:00)  T(F): 97.4 (22 Aug 2017 16:00), Max: 98.3 (22 Aug 2017 00:00)  HR: 77 (22 Aug 2017 16:18) (64 - 90)  BP: 109/49 (22 Aug 2017 16:00) (89/42 - 129/50)  BP(mean): 63 (22 Aug 2017 16:00) (52 - 79)  RR: 24 (22 Aug 2017 16:00) (14 - 24)  SpO2: 100% (22 Aug 2017 16:18) (94% - 100%)    I/Os:   I&O's Detail    21 Aug 2017 07:01  -  22 Aug 2017 07:00  --------------------------------------------------------  IN:    dextrose 5% + sodium chloride 0.9% with potassium chloride 20 mEq/L: 1755 mL    Enteral Tube Flush: 200 mL    IV PiggyBack: 1200 mL    Jevity: 360 mL  Total IN: 3515 mL    OUT:    Bulb: 80 mL    Indwelling Catheter - Urethral: 1260 mL    Nasoenteral Tube: 100 mL  Total OUT: 1440 mL    Total NET: 2075 mL      22 Aug 2017 07:01  -  22 Aug 2017 17:07  --------------------------------------------------------  IN:    dextrose 5% + sodium chloride 0.9% with potassium chloride 20 mEq/L: 300 mL    Enteral Tube Flush: 60 mL    IV PiggyBack: 100 mL    Jevity: 480 mL  Total IN: 940 mL    OUT:    Bulb: 25 mL    Indwelling Catheter - Urethral: 620 mL  Total OUT: 645 mL    Total NET: 295 mL          CAPILLARY BLOOD GLUCOSE          =======================  MEDICATIONS  ===================  MEDICATIONS  (STANDING):  heparin  Injectable 5000 Unit(s) SubCutaneous every 8 hours  aspirin Suppository 300 milliGRAM(s) Rectal daily  phenytoin  IVPB 200 milliGRAM(s) IV Intermittent two times a day  dexamethasone  Injectable 1 milliGRAM(s) IV Push daily  pantoprazole  Injectable 40 milliGRAM(s) IV Push daily  chlorhexidine 4% Liquid 1 Application(s) Topical daily  dornase ziyad Solution 2.5 milliGRAM(s) Inhalation daily  metroNIDAZOLE  IVPB   IV Intermittent   metroNIDAZOLE  IVPB 500 milliGRAM(s) IV Intermittent every 8 hours  cefepime  IVPB   IV Intermittent   cefepime  IVPB 1000 milliGRAM(s) IV Intermittent every 12 hours  vancomycin  IVPB 1000 milliGRAM(s) IV Intermittent every 12 hours  dextrose 5% + sodium chloride 0.9% with potassium chloride 20 mEq/L 1000 milliLiter(s) (30 mL/Hr) IV Continuous <Continuous>  dornase ziyad Solution 2.5 milliGRAM(s) Inhalation daily  furosemide   Injectable 10 milliGRAM(s) IV Push two times a day  ipratropium    for Nebulization 500 MICROGram(s) Nebulizer every 6 hours    MEDICATIONS  (PRN):  naloxone Injectable 0.1 milliGRAM(s) IV Push every 3 minutes PRN For ANY of the following changes in patient status:  A. RR LESS THAN 10 breaths per minute, B. Oxygen saturation LESS THAN 90%, C. Sedation score of 6  ondansetron Injectable 4 milliGRAM(s) IV Push every 6 hours PRN Nausea  ALBUTerol    90 MICROgram(s) HFA Inhaler 2 Puff(s) Inhalation every 4 hours PRN Shortness of Breath      ======================VENTILATOR SETTINGS  ==============      =================== PATIENT CARE ACCESS DEVICES ==========  Peripheral IV  Central Venous Line	R	L	IJ	Fem	SC			Placed:   Arterial Line	R	L	PT	DP	Fem	Rad	Ax	Placed:   Midline:				  Urinary Catheter, Date Placed:   Necessity of urinary, arterial, and venous catheters discussed    ======================= PHYSICAL EXAM===================  General:                         Comfortable, Awake, alert, not in any distress  Neuro:                            Moving all extremities to commands. No focal deficits	  HEENT:                           CARLOS/ ETT/ NGT/ trach  Respiratory:	Lungs clear on auscultation bilaterally with good aeration.                                           No rales, rhonchi, no wheezing. Effort even and unlabored.  CV:		Regular rate and rhythm. Normal S1/S2. No murmurs  Abdomen:	                     Soft,  nontender, not-distended. Bowel sounds present / absent.   Skin:		No rash.  Extremities:	Warm, no cyanosis or edema.  Palpable pulses    ============================ LABS =======================                        7.9    10.21 )-----------( 177      ( 22 Aug 2017 04:40 )             24.4     08-22    142  |  107  |  16  ----------------------------<  132<H>  3.7   |  23  |  0.90    Ca    8.9      22 Aug 2017 04:40  Phos  3.1     08-22  Mg     2.0     08-22                ===================== IMAGING STUDIES ===================      ====================ASSESSMENT AND PLAN ================      ====================== NEUROLOGY=======================  Pain control with PCA / PCEA / Tylenol IV / Toradol / Percocet  Pt is on Precedex for agitation  Pt is sedated with Propofol / Fentanyl    ==================== RESPIRATORY========================  Pt is on            L nasal canula / Face tent____% FiO2  Comfortable, no evidence of distress.  Using incentive spirometry & doing                ml  Monitor chest tube output  Chest tube to suction / water seal	    Mechanical Ventilation:    Mechanical ventilator status assessed & settings reviewed  Continue bronchodilators, pulmonary toilet  Head of bed elevation to 30-40 degrees    ====================CARDIOVASCULAR=====================  Continue hemodynamic monitoring/ telemetry  Not on any pressors  Continue cardiovascular / antihypertensive medications    ===================== RENAL ============================  Continue LR 30CC/hr      D/C IVF  Monitor I/Os, BUN/ Cr  and electrolytes  D/C Garza      Keep Garza  BPH: Continue Flomax/ Finasteride      ==================== GASTROINTESTINAL===================  On regular diet, tolerating well  Continue GI prophylaxis with Pepcid / Protonix  Continue Zofran / Reglan for nausea - PRN	  NPO    =======================    ENDOCRIN  =====================  Glycemic monitoring  F/S with coverage  ===================HEMATOLOGIC/ONCOLOGIC =============  Monitor chest tube output. No signs of active bleeding.   Follow CBC, coags  in AM  DVT prophylaxis with SCD, sc Heparin    ========================INFECTIOUS DISEASE===============  No signs of infection. Monitor for fever / leukocytosis.  All surgical incision / chest tube  sites look clean  D/C Garza      Pertinent clinical, laboratory, radiographic, hemodynamic, echocardiographic, respiratory data, microbiologic data and chart were reviewed and analyzed frequently throughout the course of the day and night. GI and DVT prophylaxis, glycemic control, head of bed elevation and skin care issues were addressed.  Patient seen, examined and plan discussed with CT Surgery / CTICU team during rounds.    I have spent               minutes of critical care time with this pt between            am/pm    and               am/ pm      TUCKER Zamorano MD MEJIA LATIF          MRN-[id number]    HPI:  82 yr old male with hx of cancer of esophagus s/p chemo and radiation therapy presents for preop evaluation.  Pt is now scheduled for Esophagogastroduodenoscopy, Thoracoscopic Mobilization of Stomach, Robotic Assisted Laparoscopic Esophagogastrectomy Feeding Jejunostomy, Tube, Possible Open on 07/14/17.  Of note upon physical exam for medical/cardiac clearance pt was sent for coronary ct valenzuela and was noted have blockage, pt then had angiogram and bare metal stents x 3 placed June 30, 2017.      Procedure: esophagogastrectomy  and J tube placement  for Malignant neoplasm of lower third of esophagus  08/14/2017   POD # : 5    Issues: postop pain              malnutrition              barium leak              Hx: CAD- stents, esophageal cancer, BPH      Interval/Overnight Events/ ROS  Pt remained hemodynamically stable overnight, not on any pressors or inotropes. OOB to chair, breathing comfortably with minimal pain. Ambulated several times , no SOB, no palpitations, no nausea/ no vomiting, no dizziness          PAST MEDICAL & SURGICAL HISTORY:  Coronary artery disease: s/p 3 stents on June 30, 2017 at [place] Dr. Lavelle Reid  OA (osteoarthritis) of knee: right  Former smoker, stopped smoking in distant past  Former smoker  Rheumatoid arthritis  Seizure disorder: 1 episode approximately 15 yrs ago  Asthma  Hyperlipidemia  BPH (benign prostatic hyperplasia)  HTN (hypertension)  Esophagus cancer  Chronic allergic rhinitis  BCC (basal cell carcinoma): skin  Cerebrovascular accident (CVA)  Anxiety  History of tonsillectomy  H/O heart artery stent: June 30, 2017  Knee arthropathy: left  History of total hip replacement: left  History of hernia repair    Allergies    penicillin (Rash)    Intolerances            ***VITAL SIGNS:  Vital Signs Last 24 Hrs  T(C): 36.3 (22 Aug 2017 16:00), Max: 36.8 (22 Aug 2017 00:00)  T(F): 97.4 (22 Aug 2017 16:00), Max: 98.3 (22 Aug 2017 00:00)  HR: 77 (22 Aug 2017 16:18) (64 - 90)  BP: 109/49 (22 Aug 2017 16:00) (89/42 - 129/50)  BP(mean): 63 (22 Aug 2017 16:00) (52 - 79)  RR: 24 (22 Aug 2017 16:00) (14 - 24)  SpO2: 100% (22 Aug 2017 16:18) (94% - 100%)    I/Os:   I&O's Detail    21 Aug 2017 07:01  -  22 Aug 2017 07:00  --------------------------------------------------------  IN:    dextrose 5% + sodium chloride 0.9% with potassium chloride 20 mEq/L: 1755 mL    Enteral Tube Flush: 200 mL    IV PiggyBack: 1200 mL    Jevity: 360 mL  Total IN: 3515 mL    OUT:    Bulb: 80 mL    Indwelling Catheter - Urethral: 1260 mL    Nasoenteral Tube: 100 mL  Total OUT: 1440 mL    Total NET: 2075 mL      22 Aug 2017 07:01  -  22 Aug 2017 17:07  --------------------------------------------------------  IN:    dextrose 5% + sodium chloride 0.9% with potassium chloride 20 mEq/L: 300 mL    Enteral Tube Flush: 60 mL    IV PiggyBack: 100 mL    Jevity: 480 mL  Total IN: 940 mL    OUT:    Bulb: 25 mL    Indwelling Catheter - Urethral: 620 mL  Total OUT: 645 mL    Total NET: 295 mL          CAPILLARY BLOOD GLUCOSE          =======================  MEDICATIONS  ===================  MEDICATIONS  (STANDING):  heparin  Injectable 5000 Unit(s) SubCutaneous every 8 hours  aspirin Suppository 300 milliGRAM(s) Rectal daily  phenytoin  IVPB 200 milliGRAM(s) IV Intermittent two times a day  dexamethasone  Injectable 1 milliGRAM(s) IV Push daily  pantoprazole  Injectable 40 milliGRAM(s) IV Push daily  chlorhexidine 4% Liquid 1 Application(s) Topical daily  dornase ziyad Solution 2.5 milliGRAM(s) Inhalation daily  metroNIDAZOLE  IVPB   IV Intermittent   metroNIDAZOLE  IVPB 500 milliGRAM(s) IV Intermittent every 8 hours  cefepime  IVPB   IV Intermittent   cefepime  IVPB 1000 milliGRAM(s) IV Intermittent every 12 hours  vancomycin  IVPB 1000 milliGRAM(s) IV Intermittent every 12 hours  dextrose 5% + sodium chloride 0.9% with potassium chloride 20 mEq/L 1000 milliLiter(s) (30 mL/Hr) IV Continuous <Continuous>  dornase ziyad Solution 2.5 milliGRAM(s) Inhalation daily  furosemide   Injectable 10 milliGRAM(s) IV Push two times a day  ipratropium    for Nebulization 500 MICROGram(s) Nebulizer every 6 hours    MEDICATIONS  (PRN):  naloxone Injectable 0.1 milliGRAM(s) IV Push every 3 minutes PRN For ANY of the following changes in patient status:  A. RR LESS THAN 10 breaths per minute, B. Oxygen saturation LESS THAN 90%, C. Sedation score of 6  ondansetron Injectable 4 milliGRAM(s) IV Push every 6 hours PRN Nausea  ALBUTerol    90 MICROgram(s) HFA Inhaler 2 Puff(s) Inhalation every 4 hours PRN Shortness of Breath      ======================VENTILATOR SETTINGS  ==============      =================== PATIENT CARE ACCESS DEVICES ==========  Peripheral IV  Central Venous Line	R	L	IJ	Fem	SC			Placed:   Arterial Line	R	L	PT	DP	Fem	Rad	Ax	Placed:   Midline:				  Urinary Catheter, Date Placed:   Necessity of urinary, arterial, and venous catheters discussed    ======================= PHYSICAL EXAM===================  General:                         Comfortable, Awake, alert, not in any distress  Neuro:                            Moving all extremities to commands. No focal deficits	  HEENT:                           CARLOS/ ETT/ NGT/ trach  Respiratory:	Lungs clear on auscultation bilaterally with good aeration.                                           No rales, rhonchi, no wheezing. Effort even and unlabored.  CV:		Regular rate and rhythm. Normal S1/S2. No murmurs  Abdomen:	                     Soft,  nontender, not-distended. Bowel sounds present / absent.   Skin:		No rash.  Extremities:	Warm, no cyanosis or edema.  Palpable pulses    ============================ LABS =======================                        7.9    10.21 )-----------( 177      ( 22 Aug 2017 04:40 )             24.4     08-22    142  |  107  |  16  ----------------------------<  132<H>  3.7   |  23  |  0.90    Ca    8.9      22 Aug 2017 04:40  Phos  3.1     08-22  Mg     2.0     08-22                ===================== IMAGING STUDIES ===================      ====================ASSESSMENT AND PLAN ================      ====================== NEUROLOGY=======================  Pain control with PCA / PCEA / Tylenol IV / Toradol / Percocet  Pt is on Precedex for agitation  Pt is sedated with Propofol / Fentanyl    ==================== RESPIRATORY========================  Pt is on            L nasal canula / Face tent____% FiO2  Comfortable, no evidence of distress.  Using incentive spirometry & doing                ml  Monitor chest tube output  Chest tube to suction / water seal	    Mechanical Ventilation:    Mechanical ventilator status assessed & settings reviewed  Continue bronchodilators, pulmonary toilet  Head of bed elevation to 30-40 degrees    ====================CARDIOVASCULAR=====================  Continue hemodynamic monitoring/ telemetry  Not on any pressors  Continue cardiovascular / antihypertensive medications    ===================== RENAL ============================  Continue LR 30CC/hr      D/C IVF  Monitor I/Os, BUN/ Cr  and electrolytes  D/C López      Keep López  BPH: Continue Flomax/ Finasteride      ==================== GASTROINTESTINAL===================  On regular diet, tolerating well  Continue GI prophylaxis with Pepcid / Protonix  Continue Zofran / Reglan for nausea - PRN	  NPO    =======================    ENDOCRIN  =====================  Glycemic monitoring  F/S with coverage  ===================HEMATOLOGIC/ONCOLOGIC =============  Monitor chest tube output. No signs of active bleeding.   Follow CBC, coags  in AM  DVT prophylaxis with SCD, sc Heparin    ========================INFECTIOUS DISEASE===============  No signs of infection. Monitor for fever / leukocytosis.  All surgical incision / chest tube  sites look clean  D/C López      Pertinent clinical, laboratory, radiographic, hemodynamic, echocardiographic, respiratory data, microbiologic data and chart were reviewed and analyzed frequently throughout the course of the day and night. GI and DVT prophylaxis, glycemic control, head of bed elevation and skin care issues were addressed.  Patient seen, examined and plan discussed with CT Surgery / CTICU team during rounds.    I have spent               minutes of critical care time with this pt between            am/pm    and               am/ pm      TUCKER Zamorano MD MEJIA LATIF          MRN-[id number]    HPI:  82 yr old male with hx of cancer of esophagus s/p chemo and radiation therapy presents for preop evaluation.  Pt is now scheduled for Esophagogastroduodenoscopy, Thoracoscopic Mobilization of Stomach, Robotic Assisted Laparoscopic Esophagogastrectomy Feeding Jejunostomy, Tube, Possible Open on 07/14/17.  Of note upon physical exam for medical/cardiac clearance pt was sent for coronary ct valenzuela and was noted have blockage, pt then had angiogram and bare metal stents x 3 placed June 30, 2017.      Procedure: esophagogastrectomy  and J tube placement  for Malignant neoplasm of lower third of esophagus  08/14/2017   POD # : 5    Issues: postop pain              malnutrition              barium leak              Hx: CAD- stents, esophageal cancer, BPH      Interval/Overnight Events/ ROS  Pt remained hemodynamically stable overnight, not on any pressors or inotropes. OOB to chair, breathing comfortably with minimal pain. Ambulated several times , no SOB, no palpitations, no nausea/ no vomiting, no dizziness        PAST MEDICAL & SURGICAL HISTORY:  Coronary artery disease: s/p 3 stents on June 30, 2017 at [place] Dr. Lavelle Reid  OA (osteoarthritis) of knee: right  Former smoker, stopped smoking in distant past  Former smoker  Rheumatoid arthritis  Seizure disorder: 1 episode approximately 15 yrs ago  Asthma  Hyperlipidemia  BPH (benign prostatic hyperplasia)  HTN (hypertension)  Esophagus cancer  Chronic allergic rhinitis  BCC (basal cell carcinoma): skin  Cerebrovascular accident (CVA)  Anxiety  History of tonsillectomy  H/O heart artery stent: June 30, 2017  Knee arthropathy: left  History of total hip replacement: left  History of hernia repair    Allergies  penicillin (Rash)      ***VITAL SIGNS:  Vital Signs Last 24 Hrs  T(C): 36.3 (22 Aug 2017 16:00), Max: 36.8 (22 Aug 2017 00:00)  T(F): 97.4 (22 Aug 2017 16:00), Max: 98.3 (22 Aug 2017 00:00)  HR: 77 (22 Aug 2017 16:18) (64 - 90)  BP: 109/49 (22 Aug 2017 16:00) (89/42 - 129/50)  BP(mean): 63 (22 Aug 2017 16:00) (52 - 79)  RR: 24 (22 Aug 2017 16:00) (14 - 24)  SpO2: 100% (22 Aug 2017 16:18) (94% - 100%)    I/Os:   I&O's Detail    21 Aug 2017 07:01  -  22 Aug 2017 07:00  --------------------------------------------------------  IN:    dextrose 5% + sodium chloride 0.9% with potassium chloride 20 mEq/L: 1755 mL    Enteral Tube Flush: 200 mL    IV PiggyBack: 1200 mL    Jevity: 360 mL  Total IN: 3515 mL    OUT:    Bulb: 80 mL    Indwelling Catheter - Urethral: 1260 mL    Nasoenteral Tube: 100 mL  Total OUT: 1440 mL    Total NET: 2075 mL      22 Aug 2017 07:01  -  22 Aug 2017 17:07  --------------------------------------------------------  IN:    dextrose 5% + sodium chloride 0.9% with potassium chloride 20 mEq/L: 300 mL    Enteral Tube Flush: 60 mL    IV PiggyBack: 100 mL    Jevity: 480 mL  Total IN: 940 mL    OUT:    Bulb: 25 mL    Indwelling Catheter - Urethral: 620 mL  Total OUT: 645 mL    Total NET: 295 mL      =======================  MEDICATIONS  ===================  MEDICATIONS  (STANDING):  heparin  Injectable 5000 Unit(s) SubCutaneous every 8 hours  aspirin Suppository 300 milliGRAM(s) Rectal daily  phenytoin  IVPB 200 milliGRAM(s) IV Intermittent two times a day  dexamethasone  Injectable 1 milliGRAM(s) IV Push daily  pantoprazole  Injectable 40 milliGRAM(s) IV Push daily  chlorhexidine 4% Liquid 1 Application(s) Topical daily  dornase ziyad Solution 2.5 milliGRAM(s) Inhalation daily  metroNIDAZOLE  IVPB   IV Intermittent   metroNIDAZOLE  IVPB 500 milliGRAM(s) IV Intermittent every 8 hours  cefepime  IVPB   IV Intermittent   cefepime  IVPB 1000 milliGRAM(s) IV Intermittent every 12 hours  vancomycin  IVPB 1000 milliGRAM(s) IV Intermittent every 12 hours  dextrose 5% + sodium chloride 0.9% with potassium chloride 20 mEq/L 1000 milliLiter(s) (30 mL/Hr) IV Continuous   dornase ziyad Solution 2.5 milliGRAM(s) Inhalation daily  furosemide   Injectable 10 milliGRAM(s) IV Push two times a day  ipratropium    for Nebulization 500 MICROGram(s) Nebulizer every 6 hours    MEDICATIONS  (PRN):  naloxone Injectable 0.1 milliGRAM(s) IV Push every 3 minutes PRN For ANY of the following changes in patient status:  A. RR LESS THAN 10 breaths per minute, B. Oxygen saturation LESS THAN 90%, C. Sedation score of 6  ondansetron Injectable 4 milliGRAM(s) IV Push every 6 hours PRN Nausea  ALBUTerol    90 MICROgram(s) HFA Inhaler 2 Puff(s) Inhalation every 4 hours PRN Shortness of Breath        =================== PATIENT CARE ACCESS DEVICES ==========  Peripheral IV (+)   Central Venous Line	R	L	IJ	Fem	SC			Placed:   Arterial Line	R	L	PT	DP	Fem	Rad	Ax	Placed:   Midline:				  Urinary Catheter, Date Placed:   Necessity of urinary, arterial, and venous catheters discussed    ======================= PHYSICAL EXAM===================  General:                         Comfortable, Awake, alert, not in any distress  Neuro:                            Moving all extremities to commands. No focal deficits	  HEENT:                           CARLOS/ ETT/ NGT/ trach  Respiratory:	Lungs clear on auscultation bilaterally with good aeration.                                           No rales, rhonchi, no wheezing. Effort even and unlabored.  CV:		Regular rate and rhythm. Normal S1/S2. No murmurs  Abdomen:	                     Soft,  nontender, not-distended. Bowel sounds present / absent.   Skin:		No rash.  Extremities:	Warm, no cyanosis or edema.  Palpable pulses    ============================ LABS =======================                        7.9    10.21 )-----------( 177      ( 22 Aug 2017 04:40 )             24.4     08-22    142  |  107  |  16  ----------------------------<  132<H>  3.7   |  23  |  0.90    Ca    8.9      22 Aug 2017 04:40  Phos  3.1     08-22  Mg     2.0     08-22                ===================== IMAGING STUDIES ===================      ====================ASSESSMENT AND PLAN ================      ====================== NEUROLOGY=======================  Pain control with PCA / PCEA / Tylenol IV / Toradol / Percocet  Pt is on Precedex for agitation  Pt is sedated with Propofol / Fentanyl    ==================== RESPIRATORY========================  Pt is on            L nasal canula / Face tent____% FiO2  Comfortable, no evidence of distress.  Using incentive spirometry & doing                ml  Monitor chest tube output  Chest tube to suction / water seal	    Mechanical Ventilation:    Mechanical ventilator status assessed & settings reviewed  Continue bronchodilators, pulmonary toilet  Head of bed elevation to 30-40 degrees    ====================CARDIOVASCULAR=====================  Continue hemodynamic monitoring/ telemetry  Not on any pressors  Continue cardiovascular / antihypertensive medications    ===================== RENAL ============================  Continue LR 30CC/hr      D/C IVF  Monitor I/Os, BUN/ Cr  and electrolytes  D/C López      Keep López  BPH: Continue Flomax/ Finasteride      ==================== GASTROINTESTINAL===================  On regular diet, tolerating well  Continue GI prophylaxis with Pepcid / Protonix  Continue Zofran / Reglan for nausea - PRN	  NPO    =======================    ENDOCRIN  =====================  Glycemic monitoring  F/S with coverage  ===================HEMATOLOGIC/ONCOLOGIC =============  Monitor chest tube output. No signs of active bleeding.   Follow CBC, coags  in AM  DVT prophylaxis with SCD, sc Heparin    ========================INFECTIOUS DISEASE===============  No signs of infection. Monitor for fever / leukocytosis.  All surgical incision / chest tube  sites look clean  D/C López      Pertinent clinical, laboratory, radiographic, hemodynamic, echocardiographic, respiratory data, microbiologic data and chart were reviewed and analyzed frequently throughout the course of the day and night. GI and DVT prophylaxis, glycemic control, head of bed elevation and skin care issues were addressed.  Patient seen, examined and plan discussed with CT Surgery / CTICU team during rounds.    I have spent               minutes of critical care time with this pt between            am/pm    and               am/ pm      TUCKER Zamroano MD MEJIA LATIF          [id number]    HPI:  82 yr old male with hx of cancer of esophagus s/p chemo and radiation therapy presents for preop evaluation.  Pt is now scheduled for Esophagogastroduodenoscopy, Thoracoscopic Mobilization of Stomach, Robotic Assisted Laparoscopic Esophagogastrectomy Feeding Jejunostomy, Tube, Possible Open on 07/14/17.  Of note upon physical exam for medical/cardiac clearance pt was sent for coronary ct valenzuela and was noted have blockage, pt then had angiogram and bare metal stents x 3 placed June 30, 2017.      Procedure: esophagogastrectomy  and J tube placement  for Malignant neoplasm of lower third of esophagus  08/14/2017   POD # : 5    Issues: postop pain              malnutrition              barium leak              diarrhea              Hx: CAD- stents, esophageal cancer, BPH      Interval/Overnight Events/ ROS  Pt remained hemodynamically stable overnight, not on any pressors or inotropes. OOB to chair, breathing comfortably with minimal pain. Ambulated several times , no SOB, no palpitations, no nausea/ no vomiting, no dizziness        PAST MEDICAL & SURGICAL HISTORY:  Coronary artery disease: s/p 3 stents on June 30, 2017 at [place] Dr. Lavelle Reid  OA (osteoarthritis) of knee: right  Former smoker, stopped smoking in distant past  Former smoker  Rheumatoid arthritis  Seizure disorder: 1 episode approximately 15 yrs ago  Asthma  Hyperlipidemia  BPH (benign prostatic hyperplasia)  HTN (hypertension)  Esophagus cancer  Chronic allergic rhinitis  BCC (basal cell carcinoma): skin  Cerebrovascular accident (CVA)  Anxiety  History of tonsillectomy  H/O heart artery stent: June 30, 2017  Knee arthropathy: left  History of total hip replacement: left  History of hernia repair    Allergies  penicillin (Rash)      ***VITAL SIGNS:  Vital Signs Last 24 Hrs  T(C): 36.3 (22 Aug 2017 16:00), Max: 36.8 (22 Aug 2017 00:00)  T(F): 97.4 (22 Aug 2017 16:00), Max: 98.3 (22 Aug 2017 00:00)  HR: 77 (22 Aug 2017 16:18) (64 - 90)  BP: 109/49 (22 Aug 2017 16:00) (89/42 - 129/50)  BP(mean): 63 (22 Aug 2017 16:00) (52 - 79)  RR: 24 (22 Aug 2017 16:00) (14 - 24)  SpO2: 100% (22 Aug 2017 16:18) (94% - 100%)    I/Os:   I&O's Detail    21 Aug 2017 07:01  -  22 Aug 2017 07:00  --------------------------------------------------------  IN:    dextrose 5% + sodium chloride 0.9% with potassium chloride 20 mEq/L: 1755 mL    Enteral Tube Flush: 200 mL    IV PiggyBack: 1200 mL    Jevity: 360 mL  Total IN: 3515 mL    OUT:    Bulb: 80 mL    Indwelling Catheter - Urethral: 1260 mL    Nasoenteral Tube: 100 mL  Total OUT: 1440 mL    Total NET: 2075 mL      22 Aug 2017 07:01  -  22 Aug 2017 17:07  --------------------------------------------------------  IN:    dextrose 5% + sodium chloride 0.9% with potassium chloride 20 mEq/L: 300 mL    Enteral Tube Flush: 60 mL    IV PiggyBack: 100 mL    Jevity: 480 mL  Total IN: 940 mL    OUT:    Bulb: 25 mL    Indwelling Catheter - Urethral: 620 mL  Total OUT: 645 mL    Total NET: 295 mL      =======================  MEDICATIONS  ===================  MEDICATIONS  (STANDING):  heparin  Injectable 5000 Unit(s) SubCutaneous every 8 hours  aspirin Suppository 300 milliGRAM(s) Rectal daily  phenytoin  IVPB 200 milliGRAM(s) IV Intermittent two times a day  dexamethasone  Injectable 1 milliGRAM(s) IV Push daily  pantoprazole  Injectable 40 milliGRAM(s) IV Push daily  chlorhexidine 4% Liquid 1 Application(s) Topical daily  dornase ziyad Solution 2.5 milliGRAM(s) Inhalation daily  metroNIDAZOLE  IVPB   IV Intermittent   metroNIDAZOLE  IVPB 500 milliGRAM(s) IV Intermittent every 8 hours  cefepime  IVPB   IV Intermittent   cefepime  IVPB 1000 milliGRAM(s) IV Intermittent every 12 hours  vancomycin  IVPB 1000 milliGRAM(s) IV Intermittent every 12 hours  dextrose 5% + sodium chloride 0.9% with potassium chloride 20 mEq/L 1000 milliLiter(s) (30 mL/Hr) IV Continuous   dornase ziyad Solution 2.5 milliGRAM(s) Inhalation daily  furosemide   Injectable 10 milliGRAM(s) IV Push two times a day  ipratropium    for Nebulization 500 MICROGram(s) Nebulizer every 6 hours    MEDICATIONS  (PRN):  naloxone Injectable 0.1 milliGRAM(s) IV Push every 3 minutes PRN For ANY of the following changes in patient status:  A. RR LESS THAN 10 breaths per minute, B. Oxygen saturation LESS THAN 90%, C. Sedation score of 6  ondansetron Injectable 4 milliGRAM(s) IV Push every 6 hours PRN Nausea  ALBUTerol    90 MICROgram(s) HFA Inhaler 2 Puff(s) Inhalation every 4 hours PRN Shortness of Breath        =================== PATIENT CARE ACCESS DEVICES ==========  Peripheral IV (+)   Central Venous Line	R	L	IJ	Fem	SC			Placed:   Arterial Line	R	L	PT	DP	Fem	Rad	Ax	Placed:   Midline:				  Urinary Catheter, Date Placed:   Necessity of urinary, arterial, and venous catheters discussed    ======================= PHYSICAL EXAM===================  General:                         Comfortable, Awake, alert, not in any distress  Neuro:                            Moving all extremities to commands. No focal deficits	  HEENT:                           CARLOS/ ETT/ NGT/ trach  Respiratory:	Lungs clear on auscultation bilaterally with good aeration.                                           No rales, rhonchi, no wheezing. Effort even and unlabored.  CV:		Regular rate and rhythm. Normal S1/S2. No murmurs  Abdomen:	                     Soft,  nontender, not-distended. Bowel sounds present / absent.   Skin:		No rash.  Extremities:	Warm, no cyanosis or edema.  Palpable pulses    ============================ LABS =======================                        7.9    10.21 )-----------( 177      ( 22 Aug 2017 04:40 )             24.4     08-22    142  |  107  |  16  ----------------------------<  132<H>  3.7   |  23  |  0.90    Ca    8.9      22 Aug 2017 04:40  Phos  3.1     08-22  Mg     2.0     08-22                ===================== IMAGING STUDIES ===================      ====================ASSESSMENT AND PLAN ================      ====================== NEUROLOGY=======================  Pain control with PCA / PCEA / Tylenol IV / Toradol / Percocet  Pt is on Precedex for agitation  Pt is sedated with Propofol / Fentanyl    ==================== RESPIRATORY========================  Pt is on            L nasal canula / Face tent____% FiO2  Comfortable, no evidence of distress.  Using incentive spirometry & doing                ml  Monitor chest tube output  Chest tube to suction / water seal	    Mechanical Ventilation:    Mechanical ventilator status assessed & settings reviewed  Continue bronchodilators, pulmonary toilet  Head of bed elevation to 30-40 degrees    ====================CARDIOVASCULAR=====================  Continue hemodynamic monitoring/ telemetry  Not on any pressors  Continue cardiovascular / antihypertensive medications    ===================== RENAL ============================  Continue LR 30CC/hr      D/C IVF  Monitor I/Os, BUN/ Cr  and electrolytes  D/C López      Keep López  BPH: Continue Flomax/ Finasteride      ==================== GASTROINTESTINAL===================  On regular diet, tolerating well  Continue GI prophylaxis with Pepcid / Protonix  Continue Zofran / Reglan for nausea - PRN	  NPO    =======================    ENDOCRIN  =====================  Glycemic monitoring  F/S with coverage  ===================HEMATOLOGIC/ONCOLOGIC =============  Monitor chest tube output. No signs of active bleeding.   Follow CBC, coags  in AM  DVT prophylaxis with SCD, sc Heparin    ========================INFECTIOUS DISEASE===============  No signs of infection. Monitor for fever / leukocytosis.  All surgical incision / chest tube  sites look clean  D/C López      Pertinent clinical, laboratory, radiographic, hemodynamic, echocardiographic, respiratory data, microbiologic data and chart were reviewed and analyzed frequently throughout the course of the day and night. GI and DVT prophylaxis, glycemic control, head of bed elevation and skin care issues were addressed.  Patient seen, examined and plan discussed with CT Surgery / CTICU team during rounds.    I have spent               minutes of critical care time with this pt between            am/pm    and               am/ pm      TUCKER Zamorano MD MEJIA LATIF          [id number]    HPI:  82 yr old male with hx of cancer of esophagus s/p chemo and radiation therapy presents for preop evaluation.  Pt is now scheduled for Esophagogastroduodenoscopy, Thoracoscopic Mobilization of Stomach, Robotic Assisted Laparoscopic Esophagogastrectomy Feeding Jejunostomy, Tube, Possible Open on 07/14/17.  Of note upon physical exam for medical/cardiac clearance pt was sent for coronary ct valenzuela and was noted have blockage, pt then had angiogram and bare metal stents x 3 placed June 30, 2017.      Procedure: esophagogastrectomy  and J tube placement  for Malignant neoplasm of lower third of esophagus  08/14/2017   POD # : 5    Issues: postop pain              malnutrition              barium leak              diarrhea              Hx: CAD- stents, esophageal cancer, BPH      Interval/Overnight Events/ ROS  Pt remained hemodynamically stable overnight, not on any pressors or inotropes. OOB to chair, breathing comfortably with minimal pain. Ambulated several times , no SOB, no palpitations, no nausea/ no vomiting, no dizziness        PAST MEDICAL & SURGICAL HISTORY:  Coronary artery disease: s/p 3 stents on June 30, 2017 at [place] Dr. Lavelle Reid  OA (osteoarthritis) of knee: right  Former smoker, stopped smoking in distant past  Former smoker  Rheumatoid arthritis  Seizure disorder: 1 episode approximately 15 yrs ago  Asthma  Hyperlipidemia  BPH (benign prostatic hyperplasia)  HTN (hypertension)  Esophagus cancer  Chronic allergic rhinitis  BCC (basal cell carcinoma): skin  Cerebrovascular accident (CVA)  Anxiety  History of tonsillectomy  H/O heart artery stent: June 30, 2017  Knee arthropathy: left  History of total hip replacement: left  History of hernia repair    Allergies  penicillin (Rash)      ***VITAL SIGNS:  Vital Signs Last 24 Hrs  T(C): 36.3 (22 Aug 2017 16:00), Max: 36.8 (22 Aug 2017 00:00)  T(F): 97.4 (22 Aug 2017 16:00), Max: 98.3 (22 Aug 2017 00:00)  HR: 77 (22 Aug 2017 16:18) (64 - 90)  BP: 109/49 (22 Aug 2017 16:00) (89/42 - 129/50)  BP(mean): 63 (22 Aug 2017 16:00) (52 - 79)  RR: 24 (22 Aug 2017 16:00) (14 - 24)  SpO2: 100% (22 Aug 2017 16:18) (94% - 100%)    I/Os:   I&O's Detail    21 Aug 2017 07:01  -  22 Aug 2017 07:00  --------------------------------------------------------  IN:    dextrose 5% + sodium chloride 0.9% with potassium chloride 20 mEq/L: 1755 mL    Enteral Tube Flush: 200 mL    IV PiggyBack: 1200 mL    Jevity: 360 mL  Total IN: 3515 mL    OUT:    Bulb: 80 mL    Indwelling Catheter - Urethral: 1260 mL    Nasoenteral Tube: 100 mL  Total OUT: 1440 mL    Total NET: 2075 mL      22 Aug 2017 07:01  -  22 Aug 2017 17:07  --------------------------------------------------------  IN:    dextrose 5% + sodium chloride 0.9% with potassium chloride 20 mEq/L: 300 mL    Enteral Tube Flush: 60 mL    IV PiggyBack: 100 mL    Jevity: 480 mL  Total IN: 940 mL    OUT:    Bulb: 25 mL    Indwelling Catheter - Urethral: 620 mL  Total OUT: 645 mL    Total NET: 295 mL      =======================  MEDICATIONS  ===================  MEDICATIONS  (STANDING):  heparin  Injectable 5000 Unit(s) SubCutaneous every 8 hours  aspirin Suppository 300 milliGRAM(s) Rectal daily  phenytoin  IVPB 200 milliGRAM(s) IV Intermittent two times a day  dexamethasone  Injectable 1 milliGRAM(s) IV Push daily  pantoprazole  Injectable 40 milliGRAM(s) IV Push daily  chlorhexidine 4% Liquid 1 Application(s) Topical daily  dornase ziyad Solution 2.5 milliGRAM(s) Inhalation daily  metroNIDAZOLE  IVPB   IV Intermittent   metroNIDAZOLE  IVPB 500 milliGRAM(s) IV Intermittent every 8 hours  cefepime  IVPB   IV Intermittent   cefepime  IVPB 1000 milliGRAM(s) IV Intermittent every 12 hours  vancomycin  IVPB 1000 milliGRAM(s) IV Intermittent every 12 hours  dextrose 5% + sodium chloride 0.9% with potassium chloride 20 mEq/L 1000 milliLiter(s) (30 mL/Hr) IV Continuous   dornase ziyad Solution 2.5 milliGRAM(s) Inhalation daily  furosemide   Injectable 10 milliGRAM(s) IV Push two times a day  ipratropium    for Nebulization 500 MICROGram(s) Nebulizer every 6 hours    MEDICATIONS  (PRN):  naloxone Injectable 0.1 milliGRAM(s) IV Push every 3 minutes PRN For ANY of the following changes in patient status:  A. RR LESS THAN 10 breaths per minute, B. Oxygen saturation LESS THAN 90%, C. Sedation score of 6  ondansetron Injectable 4 milliGRAM(s) IV Push every 6 hours PRN Nausea  ALBUTerol    90 MICROgram(s) HFA Inhaler 2 Puff(s) Inhalation every 4 hours PRN Shortness of Breath        =================== PATIENT CARE ACCESS DEVICES ==========  Peripheral IV (+) 			  Urinary Catheter, Date Placed: in OR   Necessity of urinary, venous catheters discussed    ======================= PHYSICAL EXAM===================  General:            Comfortable, Awake, alert, not in any distress  Neuro:              Moving all extremities to commands. No focal deficits	  HEENT:            CARLOS  Respiratory:	Lungs clear on auscultation bilaterally with good aeration.                            No rales, rhonchi, no wheezing. Effort even and unlabored.                           Right GINA site -clean  CV:		Regular rate and rhythm. Normal S1/S2. No murmurs  Abdomen:	Soft,  nontender, not-distended. Bowel sounds present                          J tube site -clean   Skin:		No rash.  Extremities:	Warm, no cyanosis or edema.  Palpable pulses    ============================ LABS =======================                        7.9    10.21 )-----------( 177      ( 22 Aug 2017 04:40 )             24.4     08-22    142  |  107  |  16  ----------------------------<  132<H>  3.7   |  23  |  0.90    Ca    8.9      22 Aug 2017 04:40  Phos  3.1     08-22  Mg     2.0     08-22                ===================== IMAGING STUDIES ===================      ====================ASSESSMENT AND PLAN ================      ====================== NEUROLOGY=======================  Pain control with PCA / PCEA / Tylenol IV / Toradol / Percocet  Pt is on Precedex for agitation  Pt is sedated with Propofol / Fentanyl    ==================== RESPIRATORY========================  Pt is on            L nasal canula / Face tent____% FiO2  Comfortable, no evidence of distress.  Using incentive spirometry & doing                ml  Monitor chest tube output  Chest tube to suction / water seal	    Mechanical Ventilation:    Mechanical ventilator status assessed & settings reviewed  Continue bronchodilators, pulmonary toilet  Head of bed elevation to 30-40 degrees    ====================CARDIOVASCULAR=====================  Continue hemodynamic monitoring/ telemetry  Not on any pressors  Continue cardiovascular / antihypertensive medications    ===================== RENAL ============================  Continue LR 30CC/hr      D/C IVF  Monitor I/Os, BUN/ Cr  and electrolytes  D/C López      Keep López  BPH: Continue Flomax/ Finasteride      ==================== GASTROINTESTINAL===================  On regular diet, tolerating well  Continue GI prophylaxis with Pepcid / Protonix  Continue Zofran / Reglan for nausea - PRN	  NPO    =======================    ENDOCRIN  =====================  Glycemic monitoring  F/S with coverage  ===================HEMATOLOGIC/ONCOLOGIC =============  Monitor chest tube output. No signs of active bleeding.   Follow CBC, coags  in AM  DVT prophylaxis with SCD, sc Heparin    ========================INFECTIOUS DISEASE===============  No signs of infection. Monitor for fever / leukocytosis.  All surgical incision / chest tube  sites look clean  D/C López      Pertinent clinical, laboratory, radiographic, hemodynamic, echocardiographic, respiratory data, microbiologic data and chart were reviewed and analyzed frequently throughout the course of the day and night. GI and DVT prophylaxis, glycemic control, head of bed elevation and skin care issues were addressed.  Patient seen, examined and plan discussed with CT Surgery / CTICU team during rounds.    I have spent               minutes of critical care time with this pt between            am/pm    and               am/ pm      TUCKER Zamorano MD MEJIA LATIF          MRN-[id number]    HPI:  82 yr old male with hx of cancer of esophagus s/p chemo and radiation therapy presents for preop evaluation.  Pt is now scheduled for Esophagogastroduodenoscopy, Thoracoscopic Mobilization of Stomach, Robotic Assisted Laparoscopic Esophagogastrectomy Feeding Jejunostomy, Tube, Possible Open on 07/14/17.  Of note upon physical exam for medical/cardiac clearance pt was sent for coronary ct valenzuela and was noted have blockage, pt then had angiogram and bare metal stents x 3 placed June 30, 2017.      Procedure: esophagogastrectomy  and J tube placement  for Malignant neoplasm of lower third of esophagus  08/14/2017   POD # : 8    Issues: postop pain              malnutrition              barium leak              diarrhea              dilutional anemia              Hx: CAD- stents, esophageal cancer, BPH, CVA - with one seizure in past ( no focal neuro deficits)      Interval/Overnight Events/ ROS  Pt remained hemodynamically stable overnight, not on any pressors or inotropes. OOB to chair, breathing comfortably with minimal pain. Ambulated several times , no SOB, no palpitations, no nausea/ no vomiting, no dizziness        PAST MEDICAL & SURGICAL HISTORY:  Coronary artery disease: s/p 3 stents on June 30, 2017 at [place] Dr. Lavelle Reid  OA (osteoarthritis) of knee: right  Former smoker, stopped smoking in distant past  Former smoker  Rheumatoid arthritis  Seizure disorder: 1 episode approximately 15 yrs ago  Asthma  Hyperlipidemia  BPH (benign prostatic hyperplasia)  HTN (hypertension)  Esophagus cancer  Chronic allergic rhinitis  BCC (basal cell carcinoma): skin  Cerebrovascular accident (CVA)  Anxiety  History of tonsillectomy  H/O heart artery stent: June 30, 2017  Knee arthropathy: left  History of total hip replacement: left  History of hernia repair    Allergies  penicillin (Rash)      ***VITAL SIGNS:  Vital Signs Last 24 Hrs  T(C): 36.3 (22 Aug 2017 16:00), Max: 36.8 (22 Aug 2017 00:00)  T(F): 97.4 (22 Aug 2017 16:00), Max: 98.3 (22 Aug 2017 00:00)  HR: 77 (22 Aug 2017 16:18) (64 - 90)  BP: 109/49 (22 Aug 2017 16:00) (89/42 - 129/50)  BP(mean): 63 (22 Aug 2017 16:00) (52 - 79)  RR: 24 (22 Aug 2017 16:00) (14 - 24)  SpO2: 100% (22 Aug 2017 16:18) (94% - 100%)    I/Os:   I&O's Detail    21 Aug 2017 07:01  -  22 Aug 2017 07:00  --------------------------------------------------------  IN:    dextrose 5% + sodium chloride 0.9% with potassium chloride 20 mEq/L: 1755 mL    Enteral Tube Flush: 200 mL    IV PiggyBack: 1200 mL    Jevity: 360 mL  Total IN: 3515 mL    OUT:    Bulb: 80 mL    Indwelling Catheter - Urethral: 1260 mL    Nasoenteral Tube: 100 mL  Total OUT: 1440 mL    Total NET: 2075 mL      22 Aug 2017 07:01  -  22 Aug 2017 17:07  --------------------------------------------------------  IN:    dextrose 5% + sodium chloride 0.9% with potassium chloride 20 mEq/L: 300 mL    Enteral Tube Flush: 60 mL    IV PiggyBack: 100 mL    Jevity: 480 mL  Total IN: 940 mL    OUT:    Bulb: 25 mL    Indwelling Catheter - Urethral: 620 mL  Total OUT: 645 mL    Total NET: 295 mL      =======================  MEDICATIONS  ===================  MEDICATIONS  (STANDING):  heparin  Injectable 5000 Unit(s) SubCutaneous every 8 hours  aspirin Suppository 300 milliGRAM(s) Rectal daily  phenytoin  IVPB 200 milliGRAM(s) IV Intermittent two times a day  dexamethasone  Injectable 1 milliGRAM(s) IV Push daily  pantoprazole  Injectable 40 milliGRAM(s) IV Push daily  chlorhexidine 4% Liquid 1 Application(s) Topical daily  dornase ziyad Solution 2.5 milliGRAM(s) Inhalation daily  metroNIDAZOLE  IVPB   IV Intermittent   metroNIDAZOLE  IVPB 500 milliGRAM(s) IV Intermittent every 8 hours  cefepime  IVPB   IV Intermittent   cefepime  IVPB 1000 milliGRAM(s) IV Intermittent every 12 hours  vancomycin  IVPB 1000 milliGRAM(s) IV Intermittent every 12 hours  dextrose 5% + sodium chloride 0.9% with potassium chloride 20 mEq/L 1000 milliLiter(s) (30 mL/Hr) IV Continuous   dornase ziyad Solution 2.5 milliGRAM(s) Inhalation daily  furosemide   Injectable 10 milliGRAM(s) IV Push two times a day  ipratropium    for Nebulization 500 MICROGram(s) Nebulizer every 6 hours    MEDICATIONS  (PRN):  naloxone Injectable 0.1 milliGRAM(s) IV Push every 3 minutes PRN For ANY of the following changes in patient status:  A. RR LESS THAN 10 breaths per minute, B. Oxygen saturation LESS THAN 90%, C. Sedation score of 6  ondansetron Injectable 4 milliGRAM(s) IV Push every 6 hours PRN Nausea  ALBUTerol    90 MICROgram(s) HFA Inhaler 2 Puff(s) Inhalation every 4 hours PRN Shortness of Breath        =================== PATIENT CARE ACCESS DEVICES ==========  Peripheral IV (+) 			  Urinary Catheter, Date Placed: in OR 8/14/17  Necessity of urinary, venous catheters discussed    ======================= PHYSICAL EXAM===================  General:            Comfortable, Awake, alert, not in any distress  Neuro:              Moving all extremities to commands. No focal deficits	  HEENT:            CARLOS  Respiratory:	Lungs  on auscultation bilaterally with good aeration.                           Few basal rales, rhonchi, no wheezing. Effort even and unlabored.                           Right GINA site -clean  CV:		Regular rate and rhythm. (+)  S1/S2.   Abdomen:	Soft,  nontender, not-distended. Bowel sounds present                          J tube site -clean   Skin:		No rash.  Extremities:	Warm, no cyanosis , trace pedal  edema.  Palpable pulses    ============================ LABS =======================                        7.9    10.21 )-----------( 177      ( 22 Aug 2017 04:40 )             24.4     08-22    142  |  107  |  16  ----------------------------<  132<H>  3.7   |  23  |  0.90    Ca    8.9      22 Aug 2017 04:40  Phos  3.1     08-22  Mg     2.0     08-22      Culture - Respiratory with Gram Stain (08.20.17 @ 16:47)    Gram Stain Sputum:   WBC^White Blood Cells  QNTY CELLS IN GRAM STAIN: FEW (2+)  GPCCL^Gram Pos Cocci In Clusters  QUANTITY OF BACTERIA SEEN: FEW (2+)  GNR^Gram Neg Rods  QUANTITY OF BACTERIA SEEN: FEW (2+)  GPR^Gram Positive Rods  QUANTITY OF BACTERIA SEEN: RARE (1+)    Culture - Respiratory:   NRF^Normal Respiratory Steph  QUANTITY OF GROWTH: MODERATE    Culture - Respiratory:   CULTURE IN PROGRESS, FURTHER REPORT TO FOLLOW.    Specimen Source: SPUTUM  Culture - Respiratory:   NRF^Normal Respiratory Steph  QUANTITY OF GROWTH: MODERATE (08.20.17 @ 16:47)      Culture - Blood:   NO ORGANISMS ISOLATED  NO ORGANISMS ISOLATED AT 48 HRS. (08.20.17 @ 14:55)      ===================== IMAGING STUDIES ===================  CXR  Aug 22 2017     CLINICAL INFORMATION: Status post esophagectomy for esophageal cancer.    INTERPRETATION:     There is retention of oral contrast was administered for CT and barium   swallow.  Bilateral subcutaneous emphysema.  There are bilateral small pleural effusions.  No pneumothorax.  The cardiac silhouette is not accurately assessed in this projection.  Mediastinal drain is in place.    IMPRESSION:  Redemonstration of small bilateral pleural effusions.   Retention of oral contrast from previous studies.        EXAM:  RAD BARIUM SWALLOW    PROCEDURE DATE:  Aug 21 2017   INTERPRETATION:  CLINICAL INFORMATION: Evaluate for leak, status post   esophagectomy with history of esophageal cancer.    IMPRESSION:  1. Leak in the region of the greater curvature at the level of the   cardioesophageal junction.  2. Contrast is also seen pooling in alinear fashion in the left upper   esophagus, this could represent a contained leak versus redundant   pull-through stomach.          CT Chest w/ Oral Cont (08.21.17 @ 18:17) >    Evaluation of the bones demonstrate degenerative changes of the spine.  IMPRESSION: Status post esophagectomy with gastric pull-through.   Contained thin linear area of contrast adjacent to the lower part of the   gastric pull-through extending posteriorly in the paraspinal location   corresponding to the abnormality seen on the esophagram performed on the   same day. There is no justice extravasation of contrast.    Small multiloculated right pleural effusion containing droplets of air is   predominantly unchanged since August 20, 2017. Small left pleural   effusion with left lung base atelectasis is stable.    Pneumomediastinum and subcutaneous emphysema as on the prior study.          ====================ASSESSMENT AND PLAN ================    82 y. M s/p  esophagogastrectomy  and J tube placement  for Malignant neoplasm of lower third of esophagus  08/14/2017   POD # : 8    Issues: postop pain              malnutrition              barium leak              diarrhea              dilutional anemia              Hx: CAD- stents, esophageal cancer, BPH, CVA - with one seizure in past ( no focal neuro deficits)      ====================== NEUROLOGY=======================  Pain control with  Tylenol IV  PRN    ==================== RESPIRATORY========================  Pt is on    2   L nasal canula  Comfortable, no evidence of distress.  Using incentive spirometry & doing   ~1000 ml  Monitor  Right GINA chest tube output ( self suction)  Continue bronchodilators, pulmonary toilet  Head of bed elevation to 30-40 degrees  OOB, ambulation    ====================CARDIOVASCULAR=====================  Continue hemodynamic monitoring/ telemetry  Not on any pressors    ===================== RENAL ============================  Continue LR 30CC/hr       Monitor I/Os, BUN/ Cr  and electrolytes  Keep López for UO monitoring  BPH: OFF  Flomax/ Finasteride due to NPO status and no meds via J tube allowed by surgeon      ==================== GASTROINTESTINAL===================  On J tube Jwvity at 50 cc/h, tolerating well  Continue GI prophylaxis with Protonix   Zofran / Reglan for nausea - PRN	  NPO    =======================    ENDOCRIN  =====================  Glycemic monitoring  F/S with coverage  ===================HEMATOLOGIC/ONCOLOGIC =============  Monitor chest tube output. No signs of active bleeding.   Follow CBC, coags  in AM  DVT prophylaxis with SCD, sc Heparin    ========================INFECTIOUS DISEASE===============  No signs of infection. Monitor for fever / leukocytosis.  All surgical incision / chest tube  sites look clean  F/up final culture results; will deescalate ABX once cultures finalized      Pertinent clinical, laboratory, radiographic, hemodynamic, echocardiographic, respiratory data, microbiologic data and chart were reviewed and analyzed frequently throughout the course of the day and night. GI and DVT prophylaxis, glycemic control, head of bed elevation and skin care issues were addressed.  Patient seen, examined and plan discussed with CT Surgery / CTICU team during rounds.    I have spent     50  minutes of critical care time with this pt between  7  am  and   6 pm      TUCKER Zamorano MD

## 2017-08-22 NOTE — PROGRESS NOTE ADULT - SUBJECTIVE AND OBJECTIVE BOX
s/p robotic-assisted laparoscopic Vance Trent esophagogastrectomy,    MEDICATIONS  (STANDING):  heparin  Injectable 5000 Unit(s) SubCutaneous every 8 hours  aspirin Suppository 300 milliGRAM(s) Rectal daily  phenytoin  IVPB 200 milliGRAM(s) IV Intermittent two times a day  dexamethasone  Injectable 1 milliGRAM(s) IV Push daily  pantoprazole  Injectable 40 milliGRAM(s) IV Push daily  chlorhexidine 4% Liquid 1 Application(s) Topical daily  dornase ziyad Solution 2.5 milliGRAM(s) Inhalation daily  metroNIDAZOLE  IVPB   IV Intermittent   metroNIDAZOLE  IVPB 500 milliGRAM(s) IV Intermittent every 8 hours  cefepime  IVPB   IV Intermittent   cefepime  IVPB 1000 milliGRAM(s) IV Intermittent every 12 hours  vancomycin  IVPB 1000 milliGRAM(s) IV Intermittent every 12 hours  dextrose 5% + sodium chloride 0.9% with potassium chloride 20 mEq/L 1000 milliLiter(s) (30 mL/Hr) IV Continuous <Continuous>  dornase ziyad Solution 2.5 milliGRAM(s) Inhalation daily  furosemide   Injectable 10 milliGRAM(s) IV Push two times a day  ipratropium    for Nebulization 500 MICROGram(s) Nebulizer every 6 hours    MEDICATIONS  (PRN):  naloxone Injectable 0.1 milliGRAM(s) IV Push every 3 minutes PRN For ANY of the following changes in patient status:  A. RR LESS THAN 10 breaths per minute, B. Oxygen saturation LESS THAN 90%, C. Sedation score of 6  ondansetron Injectable 4 milliGRAM(s) IV Push every 6 hours PRN Nausea  ALBUTerol    90 MICROgram(s) HFA Inhaler 2 Puff(s) Inhalation every 4 hours PRN Shortness of Breath  acetaminophen  IVPB. 1000 milliGRAM(s) IV Intermittent once PRN Moderate Pain (4 - 6)    Vital Signs Last 24 Hrs  T(C): 36.9 (22 Aug 2017 20:00), Max: 36.9 (22 Aug 2017 20:00)  T(F): 98.4 (22 Aug 2017 20:00), Max: 98.4 (22 Aug 2017 20:00)  HR: 71 (22 Aug 2017 22:16) (64 - 89)  BP: 111/52 (22 Aug 2017 22:00) (89/42 - 130/53)  BP(mean): 66 (22 Aug 2017 22:00) (52 - 72)  RR: 25 (22 Aug 2017 22:00) (14 - 25)  SpO2: 100% (22 Aug 2017 22:16) (94% - 100%)    Constitutional: No fever, fatigue  Skin: No rash.  Eyes: No recent vision problems or eye pain.  ENT: No congestion, ear pain, or sore throat.  Cardiovascular: No chest pain or palpation.  Respiratory: No cough, shortness of breath, congestion, or wheezing.  Gastrointestinal: No abdominal pain, nausea, vomiting, or diarrhea.  Genitourinary: No dysuria.  Musculoskeletal: No joint swelling.  Neurologic: No headache.    HEENT :peerla, eomi  CVS : s1, s2+  RS :dec breath sounds at bases  ABD : soft, bs+  EXT :no edema  NEURO :alert awake oriented    LABS:      142  |  107  |  16  ----------------------------<  132<H>  3.7   |  23  |  0.90    Ca    8.9      22 Aug 2017 04:40  Phos  3.1       Mg     2.0           Creatinine Trend: 0.90 <--, 0.88 <--, 0.95 <--, 0.88 <--, 0.78 <--, 0.82 <--, 0.82 <--, 0.79 <--, 0.88 <--, 1.07 <--                        7.9    10.21 )-----------( 177      ( 22 Aug 2017 04:40 )             24.4     Urine Studies:  Urinalysis Basic - ( 20 Aug 2017 14:40 )    Color: YELLOW / Appearance: HAZY / S.022 / pH: 7.5  Gluc: NEGATIVE / Ketone: NEGATIVE  / Bili: SMALL / Urobili: 8 E.U.   Blood: LARGE / Protein: 100 / Nitrite: NEGATIVE   Leuk Esterase: SMALL / RBC: >50 / WBC 10-25   Sq Epi:  / Non Sq Epi:  / Bacteria: FEW

## 2017-08-23 LAB
BLD GP AB SCN SERPL QL: NEGATIVE — SIGNIFICANT CHANGE UP
BUN SERPL-MCNC: 15 MG/DL — SIGNIFICANT CHANGE UP (ref 7–23)
C DIFF TOX GENS STL QL NAA+PROBE: SIGNIFICANT CHANGE UP
CALCIUM SERPL-MCNC: 8.8 MG/DL — SIGNIFICANT CHANGE UP (ref 8.4–10.5)
CHLORIDE SERPL-SCNC: 106 MMOL/L — SIGNIFICANT CHANGE UP (ref 98–107)
CO2 SERPL-SCNC: 24 MMOL/L — SIGNIFICANT CHANGE UP (ref 22–31)
CREAT SERPL-MCNC: 0.81 MG/DL — SIGNIFICANT CHANGE UP (ref 0.5–1.3)
GLUCOSE SERPL-MCNC: 90 MG/DL — SIGNIFICANT CHANGE UP (ref 70–99)
HCT VFR BLD CALC: 27.5 % — LOW (ref 39–50)
HGB BLD-MCNC: 8.9 G/DL — LOW (ref 13–17)
MAGNESIUM SERPL-MCNC: 1.8 MG/DL — SIGNIFICANT CHANGE UP (ref 1.6–2.6)
MCHC RBC-ENTMCNC: 31.8 PG — SIGNIFICANT CHANGE UP (ref 27–34)
MCHC RBC-ENTMCNC: 32.4 % — SIGNIFICANT CHANGE UP (ref 32–36)
MCV RBC AUTO: 98.2 FL — SIGNIFICANT CHANGE UP (ref 80–100)
NRBC # FLD: 0 — SIGNIFICANT CHANGE UP
PHOSPHATE SERPL-MCNC: 2.6 MG/DL — SIGNIFICANT CHANGE UP (ref 2.5–4.5)
PLATELET # BLD AUTO: 208 K/UL — SIGNIFICANT CHANGE UP (ref 150–400)
PMV BLD: 9.9 FL — SIGNIFICANT CHANGE UP (ref 7–13)
POTASSIUM SERPL-MCNC: 3.6 MMOL/L — SIGNIFICANT CHANGE UP (ref 3.5–5.3)
POTASSIUM SERPL-SCNC: 3.6 MMOL/L — SIGNIFICANT CHANGE UP (ref 3.5–5.3)
RBC # BLD: 2.8 M/UL — LOW (ref 4.2–5.8)
RBC # FLD: 13.2 % — SIGNIFICANT CHANGE UP (ref 10.3–14.5)
RH IG SCN BLD-IMP: POSITIVE — SIGNIFICANT CHANGE UP
SODIUM SERPL-SCNC: 143 MMOL/L — SIGNIFICANT CHANGE UP (ref 135–145)
WBC # BLD: 11.29 K/UL — HIGH (ref 3.8–10.5)
WBC # FLD AUTO: 11.29 K/UL — HIGH (ref 3.8–10.5)

## 2017-08-23 PROCEDURE — 71010: CPT | Mod: 26

## 2017-08-23 PROCEDURE — 99233 SBSQ HOSP IP/OBS HIGH 50: CPT

## 2017-08-23 RX ORDER — MAGNESIUM SULFATE 500 MG/ML
2 VIAL (ML) INJECTION ONCE
Qty: 0 | Refills: 0 | Status: COMPLETED | OUTPATIENT
Start: 2017-08-23 | End: 2017-08-23

## 2017-08-23 RX ORDER — LOPERAMIDE HCL 2 MG
4 TABLET ORAL ONCE
Qty: 0 | Refills: 0 | Status: COMPLETED | OUTPATIENT
Start: 2017-08-23 | End: 2017-08-23

## 2017-08-23 RX ORDER — POTASSIUM CHLORIDE 20 MEQ
40 PACKET (EA) ORAL ONCE
Qty: 0 | Refills: 0 | Status: COMPLETED | OUTPATIENT
Start: 2017-08-23 | End: 2017-08-23

## 2017-08-23 RX ADMIN — Medication 108 MILLIGRAM(S): at 07:36

## 2017-08-23 RX ADMIN — Medication 500 MICROGRAM(S): at 15:54

## 2017-08-23 RX ADMIN — PANTOPRAZOLE SODIUM 40 MILLIGRAM(S): 20 TABLET, DELAYED RELEASE ORAL at 13:00

## 2017-08-23 RX ADMIN — HEPARIN SODIUM 5000 UNIT(S): 5000 INJECTION INTRAVENOUS; SUBCUTANEOUS at 13:00

## 2017-08-23 RX ADMIN — Medication 10 MILLIGRAM(S): at 08:57

## 2017-08-23 RX ADMIN — Medication 500 MICROGRAM(S): at 23:02

## 2017-08-23 RX ADMIN — Medication 1 MILLIGRAM(S): at 06:41

## 2017-08-23 RX ADMIN — Medication 108 MILLIGRAM(S): at 20:34

## 2017-08-23 RX ADMIN — HEPARIN SODIUM 5000 UNIT(S): 5000 INJECTION INTRAVENOUS; SUBCUTANEOUS at 06:42

## 2017-08-23 RX ADMIN — Medication 500 MICROGRAM(S): at 09:50

## 2017-08-23 RX ADMIN — Medication 500 MICROGRAM(S): at 04:12

## 2017-08-23 RX ADMIN — Medication 50 GRAM(S): at 07:35

## 2017-08-23 RX ADMIN — DORNASE ALFA 2.5 MILLIGRAM(S): 1 SOLUTION RESPIRATORY (INHALATION) at 10:08

## 2017-08-23 RX ADMIN — Medication 4 MILLIGRAM(S): at 23:29

## 2017-08-23 RX ADMIN — Medication 40 MILLIEQUIVALENT(S): at 06:42

## 2017-08-23 RX ADMIN — Medication 100 MILLIGRAM(S): at 06:40

## 2017-08-23 RX ADMIN — DEXTROSE MONOHYDRATE, SODIUM CHLORIDE, AND POTASSIUM CHLORIDE 30 MILLILITER(S): 50; .745; 4.5 INJECTION, SOLUTION INTRAVENOUS at 15:41

## 2017-08-23 RX ADMIN — HEPARIN SODIUM 5000 UNIT(S): 5000 INJECTION INTRAVENOUS; SUBCUTANEOUS at 21:34

## 2017-08-23 RX ADMIN — Medication 10 MILLIGRAM(S): at 19:22

## 2017-08-23 RX ADMIN — CHLORHEXIDINE GLUCONATE 1 APPLICATION(S): 213 SOLUTION TOPICAL at 06:55

## 2017-08-23 RX ADMIN — CEFEPIME 100 MILLIGRAM(S): 1 INJECTION, POWDER, FOR SOLUTION INTRAMUSCULAR; INTRAVENOUS at 06:41

## 2017-08-23 RX ADMIN — Medication 300 MILLIGRAM(S): at 13:00

## 2017-08-23 RX ADMIN — Medication 250 MILLIGRAM(S): at 06:40

## 2017-08-23 RX ADMIN — DEXTROSE MONOHYDRATE, SODIUM CHLORIDE, AND POTASSIUM CHLORIDE 30 MILLILITER(S): 50; .745; 4.5 INJECTION, SOLUTION INTRAVENOUS at 06:43

## 2017-08-23 NOTE — PROGRESS NOTE ADULT - SUBJECTIVE AND OBJECTIVE BOX
MEJIA LATIF:[id number],   82yMale followed for:  penicillin (Rash)    PAST MEDICAL & SURGICAL HISTORY:  Coronary artery disease: s/p 3 stents on June 30, 2017 at [facility]  OA (osteoarthritis) of knee: right  Former smoker, stopped smoking in distant past  Former smoker  Rheumatoid arthritis  Seizure disorder: 1 episode approximately 15 yrs ago  Asthma  Hyperlipidemia  BPH (benign prostatic hyperplasia)  HTN (hypertension)  Esophagus cancer  Chronic allergic rhinitis  BCC (basal cell carcinoma): skin  Cerebrovascular accident (CVA)  Anxiety  History of tonsillectomy  H/O heart artery stent: June 30, 2017  Knee arthropathy: left  History of total hip replacement: left  History of hernia repair    FAMILY HISTORY:  Family history of heart attack (Sibling)  Family history of pulmonary embolism (Father): father @ 49 yr old  FH: CAD (coronary artery disease) (Mother)    MEDICATIONS  (STANDING):  heparin  Injectable 5000 Unit(s) SubCutaneous every 8 hours  aspirin Suppository 300 milliGRAM(s) Rectal daily  phenytoin  IVPB 200 milliGRAM(s) IV Intermittent two times a day  dexamethasone  Injectable 1 milliGRAM(s) IV Push daily  pantoprazole  Injectable 40 milliGRAM(s) IV Push daily  chlorhexidine 4% Liquid 1 Application(s) Topical daily  metroNIDAZOLE  IVPB   IV Intermittent   metroNIDAZOLE  IVPB 500 milliGRAM(s) IV Intermittent every 8 hours  cefepime  IVPB   IV Intermittent   cefepime  IVPB 1000 milliGRAM(s) IV Intermittent every 12 hours  vancomycin  IVPB 1000 milliGRAM(s) IV Intermittent every 12 hours  dextrose 5% + sodium chloride 0.9% with potassium chloride 20 mEq/L 1000 milliLiter(s) (30 mL/Hr) IV Continuous <Continuous>  dornase ziyad Solution 2.5 milliGRAM(s) Inhalation daily  furosemide   Injectable 10 milliGRAM(s) IV Push two times a day  ipratropium    for Nebulization 500 MICROGram(s) Nebulizer every 6 hours    MEDICATIONS  (PRN):  naloxone Injectable 0.1 milliGRAM(s) IV Push every 3 minutes PRN For ANY of the following changes in patient status:  A. RR LESS THAN 10 breaths per minute, B. Oxygen saturation LESS THAN 90%, C. Sedation score of 6  ondansetron Injectable 4 milliGRAM(s) IV Push every 6 hours PRN Nausea  ALBUTerol    90 MICROgram(s) HFA Inhaler 2 Puff(s) Inhalation every 4 hours PRN Shortness of Breath  acetaminophen  IVPB. 1000 milliGRAM(s) IV Intermittent once PRN Moderate Pain (4 - 6)      Vital Signs Last 24 Hrs  T(C): 36.9 (23 Aug 2017 08:00), Max: 37.1 (23 Aug 2017 00:00)  T(F): 98.4 (23 Aug 2017 08:00), Max: 98.8 (23 Aug 2017 00:00)  HR: 84 (23 Aug 2017 08:00) (64 - 94)  BP: 95/71 (23 Aug 2017 08:00) (95/56 - 140/101)  BP(mean): 76 (23 Aug 2017 08:00) (61 - 111)  RR: 21 (23 Aug 2017 08:00) (16 - 25)  SpO2: 97% (23 Aug 2017 08:00) (93% - 100%)  nc/at  s1s2  cta  soft, nt, nd no guarding or rebound  no c/c/e    CBC Full  -  ( 23 Aug 2017 04:20 )  WBC Count : 11.29 K/uL  Hemoglobin : 8.9 g/dL  Hematocrit : 27.5 %  Platelet Count - Automated : 208 K/uL  Mean Cell Volume : 98.2 fL  Mean Cell Hemoglobin : 31.8 pg  Mean Cell Hemoglobin Concentration : 32.4 %  Auto Neutrophil # : x  Auto Lymphocyte # : x  Auto Monocyte # : x  Auto Eosinophil # : x  Auto Basophil # : x  Auto Neutrophil % : x  Auto Lymphocyte % : x  Auto Monocyte % : x  Auto Eosinophil % : x  Auto Basophil % : x    08-23    143  |  106  |  15  ----------------------------<  90  3.6   |  24  |  0.81    Ca    8.8      23 Aug 2017 04:20  Phos  2.6     08-23  Mg     1.8     08-23

## 2017-08-23 NOTE — PROGRESS NOTE ADULT - ASSESSMENT
Assessment/Plan: 82yMale s/p [place] w/ feeding J-tube. Esophagram demonstrated contained leak. Otherwise clinically unchanged.    - Consider testing for C. diff in setting of continued diarrhea.  - cont TF  - cont abx  - pain control  - VTE ppx    Jacky Kay M.D.

## 2017-08-23 NOTE — PROGRESS NOTE ADULT - SUBJECTIVE AND OBJECTIVE BOX
s/p robotic-assisted laparoscopic Vance Trent esophagogastrectomy,    MEDICATIONS  (STANDING):  heparin  Injectable 5000 Unit(s) SubCutaneous every 8 hours  aspirin Suppository 300 milliGRAM(s) Rectal daily  phenytoin  IVPB 200 milliGRAM(s) IV Intermittent two times a day  dexamethasone  Injectable 1 milliGRAM(s) IV Push daily  pantoprazole  Injectable 40 milliGRAM(s) IV Push daily  dextrose 5% + sodium chloride 0.9% with potassium chloride 20 mEq/L 1000 milliLiter(s) (30 mL/Hr) IV Continuous <Continuous>  dornase ziyad Solution 2.5 milliGRAM(s) Inhalation daily  furosemide   Injectable 10 milliGRAM(s) IV Push two times a day  ipratropium    for Nebulization 500 MICROGram(s) Nebulizer every 6 hours    MEDICATIONS  (PRN):  ondansetron Injectable 4 milliGRAM(s) IV Push every 6 hours PRN Nausea  ALBUTerol    90 MICROgram(s) HFA Inhaler 2 Puff(s) Inhalation every 4 hours PRN Shortness of Breath  acetaminophen  IVPB. 1000 milliGRAM(s) IV Intermittent once PRN Moderate Pain (4 - 6)    Vital Signs Last 24 Hrs  T(C): 37.1 (23 Aug 2017 17:08), Max: 37.1 (23 Aug 2017 00:00)  T(F): 98.8 (23 Aug 2017 17:08), Max: 98.8 (23 Aug 2017 00:00)  HR: 91 (23 Aug 2017 17:08) (71 - 94)  BP: 141/49 (23 Aug 2017 17:08) (95/56 - 144/72)  BP(mean): 66 (23 Aug 2017 13:00) (63 - 111)  RR: 19 (23 Aug 2017 17:08) (16 - 26)  SpO2: 95% (23 Aug 2017 17:08) (93% - 100%)      Constitutional: No fever, fatigue  Skin: No rash.  Eyes: No recent vision problems or eye pain.  ENT: No congestion, ear pain, or sore throat.  Cardiovascular: No chest pain or palpation.  Respiratory: No cough, shortness of breath, congestion, or wheezing.  Gastrointestinal: No abdominal pain, nausea, vomiting, or diarrhea.  Genitourinary: No dysuria.  Musculoskeletal: No joint swelling.  Neurologic: No headache.    HEENT :peerla, eomi  CVS : s1, s2+  RS :dec breath sounds at bases  ABD : soft, bs+  EXT :no edema  NEURO :alert awake oriented    LABS:      143  |  106  |  15  ----------------------------<  90  3.6   |  24  |  0.81    Ca    8.8      23 Aug 2017 04:20  Phos  2.6     08-  Mg     1.8           Creatinine Trend: 0.81 <--, 0.90 <--, 0.88 <--, 0.95 <--, 0.88 <--, 0.78 <--, 0.82 <--, 0.82 <--, 0.79 <--, 0.88 <--                        8.9    11.29 )-----------( 208      ( 23 Aug 2017 04:20 )             27.5     Urine Studies:  Urinalysis Basic - ( 20 Aug 2017 14:40 )    Color: YELLOW / Appearance: HAZY / S.022 / pH: 7.5  Gluc: NEGATIVE / Ketone: NEGATIVE  / Bili: SMALL / Urobili: 8 E.U.   Blood: LARGE / Protein: 100 / Nitrite: NEGATIVE   Leuk Esterase: SMALL / RBC: >50 / WBC 10-25   Sq Epi:  / Non Sq Epi:  / Bacteria: FEW

## 2017-08-23 NOTE — PROGRESS NOTE ADULT - SUBJECTIVE AND OBJECTIVE BOX
MEJIA LATIF          MRN-[id number]    HPI:  82 yr old male with hx of cancer of esophagus s/p chemo and radiation therapy presents for preop evaluation.  Pt is now scheduled for Esophagogastroduodenoscopy, Thoracoscopic Mobilization of Stomach, Robotic Assisted Laparoscopic Esophagogastrectomy Feeding Jejunostomy, Tube, Possible Open on 07/14/17.  Of note upon physical exam for medical/cardiac clearance pt was sent for coronary ct valenzuela and was noted have blockage, pt then had angiogram and bare metal stents x 3 placed June 30, 2017. (11 Aug 2017 09:35)      Procedure:  POD # :     Issues:        Interval/Overnight Events/ ROS  Pt remained hemodynamically stable overnight, not on any pressors or inotropes. OOB to chair, breathing comfortably with minimal pain. Ambulated several times . Denies pain, no SOB, no palpitations, no nausea/ no vomiting, no dizziness  A-line and garza d/dylan         PAST MEDICAL & SURGICAL HISTORY:  Coronary artery disease: s/p 3 stents on June 30, 2017 at [place] Dr. Lavelle Reid  OA (osteoarthritis) of knee: right  Former smoker, stopped smoking in distant past  Former smoker  Rheumatoid arthritis  Seizure disorder: 1 episode approximately 15 yrs ago  Asthma  Hyperlipidemia  BPH (benign prostatic hyperplasia)  HTN (hypertension)  Esophagus cancer  Chronic allergic rhinitis  BCC (basal cell carcinoma): skin  Cerebrovascular accident (CVA)  Anxiety  History of tonsillectomy  H/O heart artery stent: June 30, 2017  Knee arthropathy: left  History of total hip replacement: left  History of hernia repair    Allergies    penicillin (Rash)    Intolerances            ***VITAL SIGNS:  Vital Signs Last 24 Hrs  T(C): 36.6 (23 Aug 2017 12:00), Max: 37.1 (23 Aug 2017 00:00)  T(F): 97.9 (23 Aug 2017 12:00), Max: 98.8 (23 Aug 2017 00:00)  HR: 80 (23 Aug 2017 12:00) (71 - 94)  BP: 122/46 (23 Aug 2017 12:00) (95/56 - 140/101)  BP(mean): 63 (23 Aug 2017 12:00) (63 - 111)  RR: 26 (23 Aug 2017 12:00) (16 - 26)  SpO2: 96% (23 Aug 2017 12:00) (93% - 100%)    I/Os:   I&O's Detail    22 Aug 2017 07:01  -  23 Aug 2017 07:00  --------------------------------------------------------  IN:    dextrose 5% + sodium chloride 0.9% with potassium chloride 20 mEq/L: 720 mL    Enteral Tube Flush: 180 mL    IV PiggyBack: 1150 mL    Jevity: 1180 mL  Total IN: 3230 mL    OUT:    Bulb: 165 mL    Indwelling Catheter - Urethral: 1495 mL  Total OUT: 1660 mL    Total NET: 1570 mL      23 Aug 2017 07:01  -  23 Aug 2017 13:42  --------------------------------------------------------  IN:    dextrose 5% + sodium chloride 0.9% with potassium chloride 20 mEq/L: 180 mL    Enteral Tube Flush: 40 mL    TwoCal HN: 20 mL  Total IN: 240 mL    OUT:    Bulb: 45 mL    Indwelling Catheter - Urethral: 30 mL    Voided: 550 mL  Total OUT: 625 mL    Total NET: -385 mL          CAPILLARY BLOOD GLUCOSE          =======================  MEDICATIONS  ===================  MEDICATIONS  (STANDING):  heparin  Injectable 5000 Unit(s) SubCutaneous every 8 hours  aspirin Suppository 300 milliGRAM(s) Rectal daily  phenytoin  IVPB 200 milliGRAM(s) IV Intermittent two times a day  dexamethasone  Injectable 1 milliGRAM(s) IV Push daily  pantoprazole  Injectable 40 milliGRAM(s) IV Push daily  chlorhexidine 4% Liquid 1 Application(s) Topical daily  dextrose 5% + sodium chloride 0.9% with potassium chloride 20 mEq/L 1000 milliLiter(s) (30 mL/Hr) IV Continuous <Continuous>  dornase ziyad Solution 2.5 milliGRAM(s) Inhalation daily  furosemide   Injectable 10 milliGRAM(s) IV Push two times a day  ipratropium    for Nebulization 500 MICROGram(s) Nebulizer every 6 hours    MEDICATIONS  (PRN):  ondansetron Injectable 4 milliGRAM(s) IV Push every 6 hours PRN Nausea  ALBUTerol    90 MICROgram(s) HFA Inhaler 2 Puff(s) Inhalation every 4 hours PRN Shortness of Breath  acetaminophen  IVPB. 1000 milliGRAM(s) IV Intermittent once PRN Moderate Pain (4 - 6)      ======================VENTILATOR SETTINGS  ==============      =================== PATIENT CARE ACCESS DEVICES ==========  Peripheral IV  Central Venous Line	R	L	IJ	Fem	SC			Placed:   Arterial Line	R	L	PT	DP	Fem	Rad	Ax	Placed:   Midline:				  Urinary Catheter, Date Placed:   Necessity of urinary, arterial, and venous catheters discussed    ======================= PHYSICAL EXAM===================  General:                         Comfortable, Awake, alert, not in any distress  Neuro:                            Moving all extremities to commands. No focal deficits	  HEENT:                           CARLOS/ ETT/ NGT/ trach  Respiratory:	Lungs clear on auscultation bilaterally with good aeration.                                           No rales, rhonchi, no wheezing. Effort even and unlabored.  CV:		Regular rate and rhythm. Normal S1/S2. No murmurs  Abdomen:	                     Soft,  nontender, not-distended. Bowel sounds present / absent.   Skin:		No rash.  Extremities:	Warm, no cyanosis or edema.  Palpable pulses    ============================ LABS =======================                        8.9    11.29 )-----------( 208      ( 23 Aug 2017 04:20 )             27.5     08-23    143  |  106  |  15  ----------------------------<  90  3.6   |  24  |  0.81    Ca    8.8      23 Aug 2017 04:20  Phos  2.6     08-23  Mg     1.8     08-23                ===================== IMAGING STUDIES ===================      ====================ASSESSMENT AND PLAN ================      ====================== NEUROLOGY=======================  Pain control with PCA / PCEA / Tylenol IV / Toradol / Percocet  Pt is on Precedex for agitation  Pt is sedated with Propofol / Fentanyl    ==================== RESPIRATORY========================  Pt is on            L nasal canula / Face tent____% FiO2  Comfortable, no evidence of distress.  Using incentive spirometry & doing                ml  Monitor chest tube output  Chest tube to suction / water seal	    Mechanical Ventilation:    Mechanical ventilator status assessed & settings reviewed  Continue bronchodilators, pulmonary toilet  Head of bed elevation to 30-40 degrees    ====================CARDIOVASCULAR=====================  Continue hemodynamic monitoring/ telemetry  Not on any pressors  Continue cardiovascular / antihypertensive medications    ===================== RENAL ============================  Continue LR 30CC/hr      D/C IVF  Monitor I/Os, BUN/ Cr  and electrolytes  D/C Garza      Keep Garza  BPH: Continue Flomax/ Finasteride      ==================== GASTROINTESTINAL===================  On regular diet, tolerating well  Continue GI prophylaxis with Pepcid / Protonix  Continue Zofran / Reglan for nausea - PRN	  NPO    =======================    ENDOCRIN  =====================  Glycemic monitoring  F/S with coverage  ===================HEMATOLOGIC/ONCOLOGIC =============  Monitor chest tube output. No signs of active bleeding.   Follow CBC, coags  in AM  DVT prophylaxis with SCD, sc Heparin    ========================INFECTIOUS DISEASE===============  No signs of infection. Monitor for fever / leukocytosis.  All surgical incision / chest tube  sites look clean  D/C Garza      Pertinent clinical, laboratory, radiographic, hemodynamic, echocardiographic, respiratory data, microbiologic data and chart were reviewed and analyzed frequently throughout the course of the day and night. GI and DVT prophylaxis, glycemic control, head of bed elevation and skin care issues were addressed.  Patient seen, examined and plan discussed with CT Surgery / CTICU team during rounds.    I have spent               minutes of critical care time with this pt between            am/pm    and               am/ pm      TUCKER Zamorano MD MEJIA LATIF          MRN-[id number]    HPI:  82 yr old male with hx of cancer of esophagus s/p chemo and radiation therapy presents for preop evaluation.  Pt admitted  for Esophagogastroduodenoscopy, Thoracoscopic Mobilization of Stomach, Robotic Assisted Laparoscopic Esophagogastrectomy Feeding Jejunostomy, Tube, Possible Open on 08/14/17.  Of note upon physical exam for medical/cardiac clearance pt was sent for coronary ct valenzuela and was noted have blockage, pt then had angiogram and bare metal stents x 3 placed June 30, 2017.      Procedure: esophagogastrectomy  and J tube placement  for Malignant neoplasm of lower third of esophagus  08/14/2017   POD # : 8    Issues: postop pain              malnutrition              barium leak              diarrhea -C.diff negative; likely due to residual barium or Jevity intolerance              dilutional anemia              Hx: CAD- stents, esophageal cancer, BPH, CVA - with one seizure in past ( no focal neuro deficits)      Interval/Overnight Events/ ROS  Pt remained hemodynamically stable overnight, not on any pressors or inotropes. OOB to chair, breathing comfortably with minimal pain. Ambulated on hallway , no SOB, no palpitations, no nausea/ no vomiting, no dizziness. Diarrhea free for past few hours . Jevity D/dylan, started on TwoCal via J-tube at 10 cc/h with no GI issues so far        PAST MEDICAL & SURGICAL HISTORY:  Coronary artery disease: s/p 3 stents on June 30, 2017 at [place] Dr. Lavelle Reid  OA (osteoarthritis) of knee: right  Former smoker, stopped smoking in distant past  Former smoker  Rheumatoid arthritis  Seizure disorder: 1 episode approximately 15 yrs ago  Asthma  Hyperlipidemia  BPH (benign prostatic hyperplasia)  HTN (hypertension)  Esophagus cancer  Chronic allergic rhinitis  BCC (basal cell carcinoma): skin  Cerebrovascular accident (CVA)  Anxiety  History of tonsillectomy  H/O heart artery stent: June 30, 2017  Knee arthropathy: left  History of total hip replacement: left  History of hernia repair    Allergies  penicillin (Rash)        ***VITAL SIGNS:  Vital Signs Last 24 Hrs  T(C): 36.6 (23 Aug 2017 12:00), Max: 37.1 (23 Aug 2017 00:00)  T(F): 97.9 (23 Aug 2017 12:00), Max: 98.8 (23 Aug 2017 00:00)  HR: 80 (23 Aug 2017 12:00) (71 - 94)  BP: 122/46 (23 Aug 2017 12:00) (95/56 - 140/101)  BP(mean): 63 (23 Aug 2017 12:00) (63 - 111)  RR: 26 (23 Aug 2017 12:00) (16 - 26)  SpO2: 96% (23 Aug 2017 12:00) (93% - 100%)    I/Os:   I&O's Detail    22 Aug 2017 07:01  -  23 Aug 2017 07:00  --------------------------------------------------------  IN:    dextrose 5% + sodium chloride 0.9% with potassium chloride 20 mEq/L: 720 mL    Enteral Tube Flush: 180 mL    IV PiggyBack: 1150 mL    Jevity: 1180 mL  Total IN: 3230 mL    OUT:    Bulb: 165 mL    Indwelling Catheter - Urethral: 1495 mL  Total OUT: 1660 mL    Total NET: 1570 mL      23 Aug 2017 07:01  -  23 Aug 2017 13:42  --------------------------------------------------------  IN:    dextrose 5% + sodium chloride 0.9% with potassium chloride 20 mEq/L: 180 mL    Enteral Tube Flush: 40 mL    TwoCal HN: 20 mL  Total IN: 240 mL    OUT:    Bulb: 45 mL    Indwelling Catheter - Urethral: 30 mL    Voided: 550 mL  Total OUT: 625 mL    Total NET: -385 mL      =======================  MEDICATIONS  ===================  MEDICATIONS  (STANDING):  heparin  Injectable 5000 Unit(s) SubCutaneous every 8 hours  aspirin Suppository 300 milliGRAM(s) Rectal daily  phenytoin  IVPB 200 milliGRAM(s) IV Intermittent two times a day  dexamethasone  Injectable 1 milliGRAM(s) IV Push daily  pantoprazole  Injectable 40 milliGRAM(s) IV Push daily  chlorhexidine 4% Liquid 1 Application(s) Topical daily  dextrose 5% + sodium chloride 0.9% with potassium chloride 20 mEq/L 1000 milliLiter(s) (30 mL/Hr) IV Continuous  dornase ziyad Solution 2.5 milliGRAM(s) Inhalation daily  furosemide   Injectable 10 milliGRAM(s) IV Push two times a day  ipratropium    for Nebulization 500 MICROGram(s) Nebulizer every 6 hours    MEDICATIONS  (PRN):  ondansetron Injectable 4 milliGRAM(s) IV Push every 6 hours PRN Nausea  ALBUTerol    90 MICROgram(s) HFA Inhaler 2 Puff(s) Inhalation every 4 hours PRN Shortness of Breath  acetaminophen  IVPB. 1000 milliGRAM(s) IV Intermittent once PRN Moderate Pain (4 - 6)        =================== PATIENT CARE ACCESS DEVICES ==========  Peripheral IV (+) 			  Urinary Catheter d/dylan    ======================= PHYSICAL EXAM===================  General:            Comfortable, Awake, alert, not in any distress  Neuro:               Moving all extremities to commands. No focal deficits	  HEENT:            CARLOS  Respiratory:	Lungs clear on auscultation bilaterally with good aeration.                                           No rales, rhonchi, no wheezing. Effort even and unlabored.  CV:		Regular rate and rhythm. Normal S1/S2. No murmurs  Abdomen:	                     Soft,  nontender, not-distended. Bowel sounds present / absent.   Skin:		No rash.  Extremities:	Warm, no cyanosis or edema.  Palpable pulses    ============================ LABS =======================                        8.9    11.29 )-----------( 208      ( 23 Aug 2017 04:20 )             27.5     08-23    143  |  106  |  15  ----------------------------<  90  3.6   |  24  |  0.81    Ca    8.8      23 Aug 2017 04:20  Phos  2.6     08-23  Mg     1.8     08-23                ===================== IMAGING STUDIES ===================      ====================ASSESSMENT AND PLAN ================      ====================== NEUROLOGY=======================  Pain control with PCA / PCEA / Tylenol IV / Toradol / Percocet  Pt is on Precedex for agitation  Pt is sedated with Propofol / Fentanyl    ==================== RESPIRATORY========================  Pt is on            L nasal canula / Face tent____% FiO2  Comfortable, no evidence of distress.  Using incentive spirometry & doing                ml  Monitor chest tube output  Chest tube to suction / water seal	    Mechanical Ventilation:    Mechanical ventilator status assessed & settings reviewed  Continue bronchodilators, pulmonary toilet  Head of bed elevation to 30-40 degrees    ====================CARDIOVASCULAR=====================  Continue hemodynamic monitoring/ telemetry  Not on any pressors  Continue cardiovascular / antihypertensive medications    ===================== RENAL ============================  Continue LR 30CC/hr      D/C IVF  Monitor I/Os, BUN/ Cr  and electrolytes  D/C López      Keep López  BPH: Continue Flomax/ Finasteride      ==================== GASTROINTESTINAL===================  On regular diet, tolerating well  Continue GI prophylaxis with Pepcid / Protonix  Continue Zofran / Reglan for nausea - PRN	  NPO    =======================    ENDOCRIN  =====================  Glycemic monitoring  F/S with coverage  ===================HEMATOLOGIC/ONCOLOGIC =============  Monitor chest tube output. No signs of active bleeding.   Follow CBC, coags  in AM  DVT prophylaxis with SCD, sc Heparin    ========================INFECTIOUS DISEASE===============  No signs of infection. Monitor for fever / leukocytosis.  All surgical incision / chest tube  sites look clean  D/C López      Pertinent clinical, laboratory, radiographic, hemodynamic, echocardiographic, respiratory data, microbiologic data and chart were reviewed and analyzed frequently throughout the course of the day and night. GI and DVT prophylaxis, glycemic control, head of bed elevation and skin care issues were addressed.  Patient seen, examined and plan discussed with CT Surgery / CTICU team during rounds.    I have spent               minutes of critical care time with this pt between            am/pm    and               am/ pm      TUCKER Zamorano MD MEJIA LATIF          MRN-[id number]    HPI:  82 yr old male with hx of cancer of esophagus s/p chemo and radiation therapy presents for preop evaluation.  Pt admitted  for Esophagogastroduodenoscopy, Thoracoscopic Mobilization of Stomach, Robotic Assisted Laparoscopic Esophagogastrectomy Feeding Jejunostomy, Tube, Possible Open on 08/14/17.  Of note upon physical exam for medical/cardiac clearance pt was sent for coronary ct valenzuela and was noted have blockage, pt then had angiogram and bare metal stents x 3 placed June 30, 2017.      Procedure: esophagogastrectomy  and J tube placement  for Malignant neoplasm of lower third of esophagus  08/14/2017   POD # : 9    Issues: postop pain              malnutrition              barium leak              diarrhea -C.diff negative; likely due to residual barium or Jevity intolerance              dilutional anemia              hypokalemia, hypomagnesemia - supplemented              Hx: CAD- stents, esophageal cancer, BPH,               Hx :CVA - with one seizure in past ( no focal neuro deficits)      Interval/Overnight Events/ ROS  Pt remained hemodynamically stable overnight, not on any pressors or inotropes. OOB to chair, breathing comfortably with minimal pain. Ambulated on hallway , no SOB, no palpitations, no nausea/ no vomiting, no dizziness. Diarrhea free for past few hours . Jevity D/dylan, started on TwoCal via J-tube at 10 cc/h with no GI issues so far        PAST MEDICAL & SURGICAL HISTORY:  Coronary artery disease: s/p 3 stents on June 30, 2017 at [place] Dr. Lavelle Reid  OA (osteoarthritis) of knee: right  Former smoker, stopped smoking in distant past  Former smoker  Rheumatoid arthritis  Seizure disorder: 1 episode approximately 15 yrs ago  Asthma  Hyperlipidemia  BPH (benign prostatic hyperplasia)  HTN (hypertension)  Esophagus cancer  Chronic allergic rhinitis  BCC (basal cell carcinoma): skin  Cerebrovascular accident (CVA)  Anxiety  History of tonsillectomy  H/O heart artery stent: June 30, 2017  Knee arthropathy: left  History of total hip replacement: left  History of hernia repair    Allergies  penicillin (Rash)        ***VITAL SIGNS:  Vital Signs Last 24 Hrs  T(C): 36.6 (23 Aug 2017 12:00), Max: 37.1 (23 Aug 2017 00:00)  T(F): 97.9 (23 Aug 2017 12:00), Max: 98.8 (23 Aug 2017 00:00)  HR: 80 (23 Aug 2017 12:00) (71 - 94)  BP: 122/46 (23 Aug 2017 12:00) (95/56 - 140/101)  BP(mean): 63 (23 Aug 2017 12:00) (63 - 111)  RR: 26 (23 Aug 2017 12:00) (16 - 26)  SpO2: 96% (23 Aug 2017 12:00) (93% - 100%)    I/Os:   I&O's Detail    22 Aug 2017 07:01  -  23 Aug 2017 07:00  --------------------------------------------------------  IN:    dextrose 5% + sodium chloride 0.9% with potassium chloride 20 mEq/L: 720 mL    Enteral Tube Flush: 180 mL    IV PiggyBack: 1150 mL    Jevity: 1180 mL  Total IN: 3230 mL    OUT:    Bulb: 165 mL    Indwelling Catheter - Urethral: 1495 mL  Total OUT: 1660 mL    Total NET: 1570 mL      23 Aug 2017 07:01  -  23 Aug 2017 13:42  --------------------------------------------------------  IN:    dextrose 5% + sodium chloride 0.9% with potassium chloride 20 mEq/L: 180 mL    Enteral Tube Flush: 40 mL    TwoCal HN: 20 mL  Total IN: 240 mL    OUT:    Bulb: 45 mL    Indwelling Catheter - Urethral: 30 mL    Voided: 550 mL  Total OUT: 625 mL    Total NET: -385 mL      =======================  MEDICATIONS  ===================  MEDICATIONS  (STANDING):  heparin  Injectable 5000 Unit(s) SubCutaneous every 8 hours  aspirin Suppository 300 milliGRAM(s) Rectal daily  phenytoin  IVPB 200 milliGRAM(s) IV Intermittent two times a day  dexamethasone  Injectable 1 milliGRAM(s) IV Push daily  pantoprazole  Injectable 40 milliGRAM(s) IV Push daily  chlorhexidine 4% Liquid 1 Application(s) Topical daily  dextrose 5% + sodium chloride 0.9% with potassium chloride 20 mEq/L 1000 milliLiter(s) (30 mL/Hr) IV Continuous  dornase ziyad Solution 2.5 milliGRAM(s) Inhalation daily  furosemide   Injectable 10 milliGRAM(s) IV Push two times a day  ipratropium    for Nebulization 500 MICROGram(s) Nebulizer every 6 hours    MEDICATIONS  (PRN):  ondansetron Injectable 4 milliGRAM(s) IV Push every 6 hours PRN Nausea  ALBUTerol    90 MICROgram(s) HFA Inhaler 2 Puff(s) Inhalation every 4 hours PRN Shortness of Breath  acetaminophen  IVPB. 1000 milliGRAM(s) IV Intermittent once PRN Moderate Pain (4 - 6)        =================== PATIENT CARE ACCESS DEVICES ==========  Peripheral IV (+) 			  Urinary Catheter d/dylan    ======================= PHYSICAL EXAM===================  General:            Comfortable, Awake, alert, not in any distress  Neuro:               Moving all extremities to commands. No focal deficits	  HEENT:            CARLOS  Respiratory:	Lungs  on auscultation bilaterally with good aeration.                           Fine basal rales, no wheezing. Effort even and unlabored.                          Right GINA site  is clean; GINA on low wall suction  CV:		Regular rate and rhythm. Normal S1/S2. No murmurs  Abdomen          Soft,  nontender, not-distended. Bowel sounds present.                          J tube site - clean.   Skin:		No rash.  Extremities:	Warm, no cyanosis, minimal pedal edema.  Palpable pulses    ============================ LABS =======================                        8.9    11.29 )-----------( 208      ( 23 Aug 2017 04:20 )             27.5     08-23    143  |  106  |  15  ----------------------------<  90  3.6   |  24  |  0.81    Ca    8.8      23 Aug 2017 04:20  Phos  2.6     08-23  Mg     1.8     08-23    Clostridium difficile Toxin by PCR (08.22.17 @ 05:45)    Clostridium difficile Toxin by PCR: NotDetec:       Culture - Blood (08.20.17 @ 14:55)    Culture - Blood:   NO ORGANISMS ISOLATED  NO ORGANISMS ISOLATED AT 48 HRS.    Culture - Respiratory:   NRF^Normal Respiratory Steph  QUANTITY OF GROWTH: MODERATE (08.20.17 @ 16:47)      ===================== IMAGING STUDIES ===================  CXR   Aug 22 2017     CLINICAL INFORMATION: Status post esophagectomy for esophageal cancer.  COMPARISON:  Chest x-ray dated 8/21/2017.    INTERPRETATION:   There is retention of oral contrast was administered for CT and barium   swallow.  Bilateral subcutaneous emphysema.  There are bilateral small pleural effusions.  No pneumothorax.  The cardiac silhouette is not accurately assessed in this projection.  Mediastinal drain is in place.    IMPRESSION:  Redemonstration of small bilateral pleural effusions.   Retention of oral contrast from previous studies.    CXR 8/23/17 unchanged    ====================ASSESSMENT AND PLAN ================    82 y. M s/p esophagogastrectomy  and J tube placement  for Malignant neoplasm of lower third of esophagus  08/14/2017       Issues: postop pain- improved              malnutrition              barium leak              diarrhea - likely due to residual barium or Jevity intolerance; C.diff negative;              dilutional anemia - improving              hypokalemia, hypomagnesemia - supplemented              Hx: CAD- stents, esophageal cancer, BPH,               Hx :CVA - with one seizure in past ( no focal neuro deficits)    ====================== NEUROLOGY=======================  Pain control with Tylenol IV  PRN    ==================== RESPIRATORY========================  Pt is on            L nasal canula / Face tent____% FiO2  Comfortable, no evidence of distress.  Using incentive spirometry & doing                ml  Monitor chest tube output  Chest tube to suction / water seal	    Mechanical Ventilation:    Mechanical ventilator status assessed & settings reviewed  Continue bronchodilators, pulmonary toilet  Head of bed elevation to 30-40 degrees    ====================CARDIOVASCULAR=====================  Continue hemodynamic monitoring/ telemetry  Not on any pressors  Continue cardiovascular / antihypertensive medications    ===================== RENAL ============================  Continue LR 30CC/hr      D/C IVF  Monitor I/Os, BUN/ Cr  and electrolytes  D/C López      Keep López  BPH: Continue Flomax/ Finasteride      ==================== GASTROINTESTINAL===================  On regular diet, tolerating well  Continue GI prophylaxis with Pepcid / Protonix  Continue Zofran / Reglan for nausea - PRN	  NPO    =======================    ENDOCRIN  =====================  Glycemic monitoring  F/S with coverage  ===================HEMATOLOGIC/ONCOLOGIC =============  Monitor chest tube output. No signs of active bleeding.   Follow CBC, coags  in AM  DVT prophylaxis with SCD, sc Heparin    ========================INFECTIOUS DISEASE===============  No signs of infection. Monitor for fever / leukocytosis.  All surgical incision / chest tube  sites look clean  D/C López      Pertinent clinical, laboratory, radiographic, hemodynamic, echocardiographic, respiratory data, microbiologic data and chart were reviewed and analyzed frequently throughout the course of the day and night. GI and DVT prophylaxis, glycemic control, head of bed elevation and skin care issues were addressed.  Patient seen, examined and plan discussed with CT Surgery / CTICU team during rounds.    I have spent               minutes of critical care time with this pt between            am/pm    and               am/ pm      TUCKER Zamorano MD MEJIA LATIF          MRN-[id number]    HPI:  82 yr old male with hx of cancer of esophagus s/p chemo and radiation therapy presents for preop evaluation.  Pt admitted  for Esophagogastroduodenoscopy, Thoracoscopic Mobilization of Stomach, Robotic Assisted Laparoscopic Esophagogastrectomy Feeding Jejunostomy, Tube, Possible Open on 08/14/17.  Of note upon physical exam for medical/cardiac clearance pt was sent for coronary ct valenzuela and was noted have blockage, pt then had angiogram and bare metal stents x 3 placed June 30, 2017.      Procedure: esophagogastrectomy  and J tube placement  for Malignant neoplasm of lower third of esophagus  08/14/2017   POD # : 9    Issues: postop pain              malnutrition              barium leak              diarrhea -C.diff negative; likely due to residual barium or Jevity intolerance              dilutional anemia              hypokalemia, hypomagnesemia - supplemented              Hx: CAD- stents, esophageal cancer, BPH,               Hx :CVA - with one seizure in past ( no focal neuro deficits)      Interval/Overnight Events/ ROS  Pt remained hemodynamically stable overnight, not on any pressors or inotropes. OOB to chair, breathing comfortably with minimal pain. Ambulated on hallway , no SOB, no palpitations, no nausea/ no vomiting, no dizziness. Diarrhea free for past few hours . Jevity D/dylan, started on TwoCal via J-tube at 10 cc/h with no GI issues so far        PAST MEDICAL & SURGICAL HISTORY:  Coronary artery disease: s/p 3 stents on June 30, 2017 at [place] Dr. Lavelle Reid  OA (osteoarthritis) of knee: right  Former smoker, stopped smoking in distant past  Former smoker  Rheumatoid arthritis  Seizure disorder: 1 episode approximately 15 yrs ago  Asthma  Hyperlipidemia  BPH (benign prostatic hyperplasia)  HTN (hypertension)  Esophagus cancer  Chronic allergic rhinitis  BCC (basal cell carcinoma): skin  Cerebrovascular accident (CVA)  Anxiety  History of tonsillectomy  H/O heart artery stent: June 30, 2017  Knee arthropathy: left  History of total hip replacement: left  History of hernia repair    Allergies  penicillin (Rash)        ***VITAL SIGNS:  Vital Signs Last 24 Hrs  T(C): 36.6 (23 Aug 2017 12:00), Max: 37.1 (23 Aug 2017 00:00)  T(F): 97.9 (23 Aug 2017 12:00), Max: 98.8 (23 Aug 2017 00:00)  HR: 80 (23 Aug 2017 12:00) (71 - 94)  BP: 122/46 (23 Aug 2017 12:00) (95/56 - 140/101)  BP(mean): 63 (23 Aug 2017 12:00) (63 - 111)  RR: 26 (23 Aug 2017 12:00) (16 - 26)  SpO2: 96% (23 Aug 2017 12:00) (93% - 100%)    I/Os:   I&O's Detail    22 Aug 2017 07:01  -  23 Aug 2017 07:00  --------------------------------------------------------  IN:    dextrose 5% + sodium chloride 0.9% with potassium chloride 20 mEq/L: 720 mL    Enteral Tube Flush: 180 mL    IV PiggyBack: 1150 mL    Jevity: 1180 mL  Total IN: 3230 mL    OUT:    Bulb: 165 mL    Indwelling Catheter - Urethral: 1495 mL  Total OUT: 1660 mL    Total NET: 1570 mL      23 Aug 2017 07:01  -  23 Aug 2017 13:42  --------------------------------------------------------  IN:    dextrose 5% + sodium chloride 0.9% with potassium chloride 20 mEq/L: 180 mL    Enteral Tube Flush: 40 mL    TwoCal HN: 20 mL  Total IN: 240 mL    OUT:    Bulb: 45 mL    Indwelling Catheter - Urethral: 30 mL    Voided: 550 mL  Total OUT: 625 mL    Total NET: -385 mL      =======================  MEDICATIONS  ===================  MEDICATIONS  (STANDING):  heparin  Injectable 5000 Unit(s) SubCutaneous every 8 hours  aspirin Suppository 300 milliGRAM(s) Rectal daily  phenytoin  IVPB 200 milliGRAM(s) IV Intermittent two times a day  dexamethasone  Injectable 1 milliGRAM(s) IV Push daily  pantoprazole  Injectable 40 milliGRAM(s) IV Push daily  chlorhexidine 4% Liquid 1 Application(s) Topical daily  dextrose 5% + sodium chloride 0.9% with potassium chloride 20 mEq/L 1000 milliLiter(s) (30 mL/Hr) IV Continuous  dornase ziyad Solution 2.5 milliGRAM(s) Inhalation daily  furosemide   Injectable 10 milliGRAM(s) IV Push two times a day  ipratropium    for Nebulization 500 MICROGram(s) Nebulizer every 6 hours    MEDICATIONS  (PRN):  ondansetron Injectable 4 milliGRAM(s) IV Push every 6 hours PRN Nausea  ALBUTerol    90 MICROgram(s) HFA Inhaler 2 Puff(s) Inhalation every 4 hours PRN Shortness of Breath  acetaminophen  IVPB. 1000 milliGRAM(s) IV Intermittent once PRN Moderate Pain (4 - 6)        =================== PATIENT CARE ACCESS DEVICES ==========  Peripheral IV (+) 			  Urinary Catheter d/dylan    ======================= PHYSICAL EXAM===================  General:            Comfortable, Awake, alert, not in any distress  Neuro:               Moving all extremities to commands. No focal deficits	  HEENT:            CARLOS  Respiratory:	Lungs  on auscultation bilaterally with good aeration.                           Fine basal rales, no wheezing. Effort even and unlabored.                          Right GINA site  is clean; GINA on low wall suction  CV:		Regular rate and rhythm. Normal S1/S2. No murmurs  Abdomen          Soft,  nontender, not-distended. Bowel sounds present.                          J tube site - clean.   Skin:		No rash.  Extremities:	Warm, no cyanosis, minimal pedal edema.  Palpable pulses    ============================ LABS =======================                        8.9    11.29 )-----------( 208      ( 23 Aug 2017 04:20 )             27.5     08-23    143  |  106  |  15  ----------------------------<  90  3.6   |  24  |  0.81    Ca    8.8      23 Aug 2017 04:20  Phos  2.6     08-23  Mg     1.8     08-23    Clostridium difficile Toxin by PCR (08.22.17 @ 05:45)    Clostridium difficile Toxin by PCR: NotDetec:       Culture - Blood (08.20.17 @ 14:55)    Culture - Blood:   NO ORGANISMS ISOLATED  NO ORGANISMS ISOLATED AT 48 HRS.    Culture - Respiratory:   NRF^Normal Respiratory Steph  QUANTITY OF GROWTH: MODERATE (08.20.17 @ 16:47)      ===================== IMAGING STUDIES ===================  CXR   Aug 22 2017     CLINICAL INFORMATION: Status post esophagectomy for esophageal cancer.  COMPARISON:  Chest x-ray dated 8/21/2017.    INTERPRETATION:   There is retention of oral contrast was administered for CT and barium   swallow.  Bilateral subcutaneous emphysema.  There are bilateral small pleural effusions.  No pneumothorax.  The cardiac silhouette is not accurately assessed in this projection.  Mediastinal drain is in place.    IMPRESSION:  Redemonstration of small bilateral pleural effusions.   Retention of oral contrast from previous studies.    CXR 8/23/17 unchanged    ====================ASSESSMENT AND PLAN ================    82 y. M s/p esophagogastrectomy  and J tube placement  for Malignant neoplasm of lower third of esophagus  08/14/2017       Issues: postop pain- improved              malnutrition              barium leak              diarrhea - likely due to residual barium or Jevity intolerance; C.diff negative;              dilutional anemia - improving              hypokalemia, hypomagnesemia - supplemented              Hx: CAD- stents, esophageal cancer, BPH,               Hx :CVA - with one seizure in past ( no focal neuro deficits)    ====================== NEUROLOGY=======================  Pain control with Tylenol IV  PRN  OOB  Ambulation  ==================== RESPIRATORY========================  Pt is on  room air   Comfortable, no evidence of distress.  Using incentive spirometry & doing ~ 1000 ml  Monitor right GINA chest tube output ( bulb to wall suction)  Continue bronchodilators, pulmonary toilet  Head of bed elevation to 30-40 degrees    ====================CARDIOVASCULAR=====================  Continue hemodynamic monitoring/ telemetry    ===================== RENAL ============================  Continue D5NS with KCL at 30 cc/h  Monitor I/Os, BUN/ Cr  and electrolytes  López  d/dylan in AM; pt is voiding ok  BPH: OFF  Flomax/ Finasteride due to  NPO status      ==================== GASTROINTESTINAL===================  On JTube feeds changed to TwoCal at 10 cc/h, tolerating well so far  Nutritional team to f/up  Continue GI prophylaxis with Protonix  Continue Zofran  for nausea - PRN	  NPO    =======================    ENDOCRIN  =====================  Glycemic monitoring  F/S with coverage  ===================HEMATOLOGIC/ONCOLOGIC =============  Monitor chest tube output. No signs of active bleeding.   Follow CBC, coags  in AM  DVT prophylaxis with SCD, sc Heparin    ========================INFECTIOUS DISEASE===============  No signs of active  infection. Monitor for fever / leukocytosis.  All surgical incision / chest tube  sites look clean  D/dylan ABX today        Pertinent clinical, laboratory, radiographic, hemodynamic, echocardiographic, respiratory data, microbiologic data and chart were reviewed and analyzed frequently throughout the course of the day and night. GI and DVT prophylaxis, glycemic control, head of bed elevation and skin care issues were addressed.  Patient seen, examined and plan discussed with CT Surgery / CTICU team during rounds.    I have spent    35           minutes of critical care time with this pt between    7 am    and   2  pm      TUCKER Zamorano MD

## 2017-08-23 NOTE — PROGRESS NOTE ADULT - ASSESSMENT
s/p [place] [facility] esophagectomy for esophageal cancer     - - Prophylactic Antibiotics: cefepime, Flagyl, vancomycin  - Consider thoracentesis vs pig tail catheter for pleural effusions given respiratory status  - Esophagram demonstrates contained leak.- Tube feeds at goal, npo  - Chest PT

## 2017-08-23 NOTE — PROGRESS NOTE ADULT - SUBJECTIVE AND OBJECTIVE BOX
D Team Progress Note    S: No acute events overnight. Patient resting comfortably in chair. Pain well controlled. Denies fevers/chills and N/V. Does complain of continued diarrhea.    O:  T(C): 36.9 (08-23-17 @ 08:00), Max: 37.1 (08-23-17 @ 00:00)  HR: 80 (08-23-17 @ 09:00) (64 - 94)  BP: 115/57 (08-23-17 @ 09:00) (95/56 - 140/101)  RR: 22 (08-23-17 @ 09:00) (16 - 25)  SpO2: 96% (08-23-17 @ 09:00) (93% - 100%)  Wt(kg): --    08-22 @ 07:01  -  08-23 @ 07:00  --------------------------------------------------------  IN:    dextrose 5% + sodium chloride 0.9% with potassium chloride 20 mEq/L: 720 mL    Enteral Tube Flush: 180 mL    IV PiggyBack: 1150 mL    Jevity: 1180 mL  Total IN: 3230 mL    OUT:    Bulb: 165 mL    Indwelling Catheter - Urethral: 1495 mL  Total OUT: 1660 mL    Total NET: 1570 mL      08-23 @ 07:01  -  08-23 @ 09:14  --------------------------------------------------------  IN:    dextrose 5% + sodium chloride 0.9% with potassium chloride 20 mEq/L: 60 mL    Enteral Tube Flush: 20 mL  Total IN: 80 mL    OUT:    Bulb: 15 mL    Indwelling Catheter - Urethral: 30 mL  Total OUT: 45 mL    Total NET: 35 mL        CBC Full  -  ( 23 Aug 2017 04:20 )  WBC Count : 11.29 K/uL  Hemoglobin : 8.9 g/dL  Hematocrit : 27.5 %  Platelet Count - Automated : 208 K/uL  Mean Cell Volume : 98.2 fL  Mean Cell Hemoglobin : 31.8 pg  Mean Cell Hemoglobin Concentration : 32.4 %        PHYSICAL EXAM:  Gen: A&Ox3, laying in bed in NAD  Abdomen: soft, NT, ND, J tube intact, GINA drain without minimal serous output

## 2017-08-24 DIAGNOSIS — F43.22 ADJUSTMENT DISORDER WITH ANXIETY: ICD-10-CM

## 2017-08-24 LAB
-  CEFTAZIDIME: SIGNIFICANT CHANGE UP
-  LEVOFLOXACIN: SIGNIFICANT CHANGE UP
-  TRIMETHOPRIM/SULFAMETHOXAZOLE: SIGNIFICANT CHANGE UP
BACTERIA SPT RESP CULT: SIGNIFICANT CHANGE UP
BACTERIA SPT RESP CULT: SIGNIFICANT CHANGE UP
BUN SERPL-MCNC: 14 MG/DL — SIGNIFICANT CHANGE UP (ref 7–23)
CALCIUM SERPL-MCNC: 9.1 MG/DL — SIGNIFICANT CHANGE UP (ref 8.4–10.5)
CHLORIDE SERPL-SCNC: 106 MMOL/L — SIGNIFICANT CHANGE UP (ref 98–107)
CO2 SERPL-SCNC: 24 MMOL/L — SIGNIFICANT CHANGE UP (ref 22–31)
CREAT SERPL-MCNC: 0.81 MG/DL — SIGNIFICANT CHANGE UP (ref 0.5–1.3)
GLUCOSE SERPL-MCNC: 117 MG/DL — HIGH (ref 70–99)
GRAM STN SPT: SIGNIFICANT CHANGE UP
HCT VFR BLD CALC: 27.2 % — LOW (ref 39–50)
HGB BLD-MCNC: 8.7 G/DL — LOW (ref 13–17)
MCHC RBC-ENTMCNC: 31 PG — SIGNIFICANT CHANGE UP (ref 27–34)
MCHC RBC-ENTMCNC: 32 % — SIGNIFICANT CHANGE UP (ref 32–36)
MCV RBC AUTO: 96.8 FL — SIGNIFICANT CHANGE UP (ref 80–100)
METHOD TYPE: SIGNIFICANT CHANGE UP
NRBC # FLD: 0 — SIGNIFICANT CHANGE UP
ORGANISM # SPEC MICROSCOPIC CNT: SIGNIFICANT CHANGE UP
PLATELET # BLD AUTO: 234 K/UL — SIGNIFICANT CHANGE UP (ref 150–400)
PMV BLD: 9.7 FL — SIGNIFICANT CHANGE UP (ref 7–13)
POTASSIUM SERPL-MCNC: 3.5 MMOL/L — SIGNIFICANT CHANGE UP (ref 3.5–5.3)
POTASSIUM SERPL-SCNC: 3.5 MMOL/L — SIGNIFICANT CHANGE UP (ref 3.5–5.3)
RBC # BLD: 2.81 M/UL — LOW (ref 4.2–5.8)
RBC # FLD: 13.3 % — SIGNIFICANT CHANGE UP (ref 10.3–14.5)
SODIUM SERPL-SCNC: 143 MMOL/L — SIGNIFICANT CHANGE UP (ref 135–145)
WBC # BLD: 13.92 K/UL — HIGH (ref 3.8–10.5)
WBC # FLD AUTO: 13.92 K/UL — HIGH (ref 3.8–10.5)

## 2017-08-24 PROCEDURE — 71010: CPT | Mod: 26

## 2017-08-24 PROCEDURE — 99223 1ST HOSP IP/OBS HIGH 75: CPT

## 2017-08-24 RX ORDER — IPRATROPIUM/ALBUTEROL SULFATE 18-103MCG
3 AEROSOL WITH ADAPTER (GRAM) INHALATION ONCE
Qty: 0 | Refills: 0 | Status: COMPLETED | OUTPATIENT
Start: 2017-08-24 | End: 2017-08-24

## 2017-08-24 RX ORDER — POTASSIUM CHLORIDE 20 MEQ
40 PACKET (EA) ORAL ONCE
Qty: 0 | Refills: 0 | Status: COMPLETED | OUTPATIENT
Start: 2017-08-24 | End: 2017-08-24

## 2017-08-24 RX ORDER — ACETAMINOPHEN 500 MG
1000 TABLET ORAL ONCE
Qty: 0 | Refills: 0 | Status: COMPLETED | OUTPATIENT
Start: 2017-08-24 | End: 2017-08-24

## 2017-08-24 RX ORDER — DIPHENHYDRAMINE HCL 50 MG
25 CAPSULE ORAL ONCE
Qty: 0 | Refills: 0 | Status: COMPLETED | OUTPATIENT
Start: 2017-08-24 | End: 2017-08-24

## 2017-08-24 RX ADMIN — Medication 108 MILLIGRAM(S): at 05:30

## 2017-08-24 RX ADMIN — DORNASE ALFA 2.5 MILLIGRAM(S): 1 SOLUTION RESPIRATORY (INHALATION) at 11:25

## 2017-08-24 RX ADMIN — HEPARIN SODIUM 5000 UNIT(S): 5000 INJECTION INTRAVENOUS; SUBCUTANEOUS at 05:30

## 2017-08-24 RX ADMIN — Medication 400 MILLIGRAM(S): at 18:51

## 2017-08-24 RX ADMIN — Medication 25 MILLIGRAM(S): at 02:43

## 2017-08-24 RX ADMIN — Medication 1000 MILLIGRAM(S): at 12:34

## 2017-08-24 RX ADMIN — Medication 10 MILLIGRAM(S): at 17:01

## 2017-08-24 RX ADMIN — Medication 10 MILLIGRAM(S): at 05:30

## 2017-08-24 RX ADMIN — Medication 1000 MILLIGRAM(S): at 19:07

## 2017-08-24 RX ADMIN — Medication 3 MILLILITER(S): at 02:29

## 2017-08-24 RX ADMIN — PANTOPRAZOLE SODIUM 40 MILLIGRAM(S): 20 TABLET, DELAYED RELEASE ORAL at 11:57

## 2017-08-24 RX ADMIN — Medication 3 MILLILITER(S): at 06:55

## 2017-08-24 RX ADMIN — Medication 400 MILLIGRAM(S): at 12:19

## 2017-08-24 RX ADMIN — Medication 0.5 MILLIGRAM(S): at 12:19

## 2017-08-24 RX ADMIN — Medication 1 MILLIGRAM(S): at 05:30

## 2017-08-24 RX ADMIN — Medication 300 MILLIGRAM(S): at 17:35

## 2017-08-24 RX ADMIN — Medication 500 MICROGRAM(S): at 16:17

## 2017-08-24 RX ADMIN — Medication 500 MICROGRAM(S): at 11:25

## 2017-08-24 RX ADMIN — HEPARIN SODIUM 5000 UNIT(S): 5000 INJECTION INTRAVENOUS; SUBCUTANEOUS at 17:01

## 2017-08-24 RX ADMIN — HEPARIN SODIUM 5000 UNIT(S): 5000 INJECTION INTRAVENOUS; SUBCUTANEOUS at 21:28

## 2017-08-24 RX ADMIN — Medication 40 MILLIEQUIVALENT(S): at 11:57

## 2017-08-24 RX ADMIN — Medication 108 MILLIGRAM(S): at 17:01

## 2017-08-24 RX ADMIN — Medication 500 MICROGRAM(S): at 22:17

## 2017-08-24 RX ADMIN — DEXTROSE MONOHYDRATE, SODIUM CHLORIDE, AND POTASSIUM CHLORIDE 30 MILLILITER(S): 50; .745; 4.5 INJECTION, SOLUTION INTRAVENOUS at 07:52

## 2017-08-24 NOTE — PROGRESS NOTE ADULT - SUBJECTIVE AND OBJECTIVE BOX
Subjective: " I can't sleep at night. I'm up all night anxious and restless" Denies CP or SOB, states prod cough. Worse this morning, now improved. Denies abd pain. Had diarrhea over night but since imodium it's much improved.     Vital Signs:  Vital Signs Last 24 Hrs  T(C): 36.9 (08-24-17 @ 11:56), Max: 37.1 (08-23-17 @ 17:08)  T(F): 98.5 (08-24-17 @ 11:56), Max: 98.8 (08-23-17 @ 17:08)  HR: 81 (08-24-17 @ 16:20) (76 - 99)  BP: 134/45 (08-24-17 @ 11:56) (129/52 - 141/60)  RR: 18 (08-24-17 @ 11:56) (18 - 19)  SpO2: 97% (08-24-17 @ 16:20) (90% - 100%) on (O2)    Telemetry/Alarms:  General: WN/WD NAD  Neurology: Awake, nonfocal, MAI x 4, anxious, restless   Eyes: Scleras clear, PERRLA/ EOMI, Gross vision intact  ENT:Gross hearing intact, grossly patent pharynx, no stridor  Neck: Neck supple, trachea midline, No JVD,   Respiratory: + course rhonchi and wheezing, given STAT neb and chest PT ,now improved.   CV: RRR, S1S2, no murmurs, rubs or gallops  Abdominal: Soft, NT, ND +BS, + diarrhea overnight, now resolved. Strict NPO. J tube infusing Two Case HN at 32cc/hrs. Connect of J tube to feeding tubing leaking, Removed and luer lock tubing placed instead, currently infusing without issues.   Extremities: No edema, + peripheral pulses  Skin: No Rashes, Hematoma, Ecchymosis  Lymphatic: No Neck, axilla, groin LAD  Psych: Oriented x 3, normal affect  Incisions: VATS and abd sites c/d/i  Tubes: J tube in place. Rt. chest kyara to self suction, minimal output  Relevant labs, radiology and Medications reviewed                        8.7    13.92 )-----------( 234      ( 24 Aug 2017 06:00 )             27.2     08-24    143  |  106  |  14  ----------------------------<  117<H>  3.5   |  24  |  0.81    Ca    9.1      24 Aug 2017 06:00  Phos  2.6     08-23  Mg     1.8     08-23        MEDICATIONS  (STANDING):  heparin  Injectable 5000 Unit(s) SubCutaneous every 8 hours  aspirin Suppository 300 milliGRAM(s) Rectal daily  phenytoin  IVPB 200 milliGRAM(s) IV Intermittent two times a day  dexamethasone  Injectable 1 milliGRAM(s) IV Push daily  pantoprazole  Injectable 40 milliGRAM(s) IV Push daily  dornase ziyad Solution 2.5 milliGRAM(s) Inhalation daily  furosemide   Injectable 10 milliGRAM(s) IV Push two times a day  ipratropium    for Nebulization 500 MICROGram(s) Nebulizer every 6 hours  LORazepam   Injectable 1 milliGRAM(s) IV Push two times a day    MEDICATIONS  (PRN):  ondansetron Injectable 4 milliGRAM(s) IV Push every 6 hours PRN Nausea  ALBUTerol    90 MICROgram(s) HFA Inhaler 2 Puff(s) Inhalation every 4 hours PRN Shortness of Breath  LORazepam   Injectable 0.5 milliGRAM(s) IV Push every 8 hours PRN Anxiety    Pertinent Physical Exam  I&O's Summary    23 Aug 2017 07:01  -  24 Aug 2017 07:00  --------------------------------------------------------  IN: 280 mL / OUT: 1415 mL / NET: -1135 mL    24 Aug 2017 07:01  -  24 Aug 2017 16:31  --------------------------------------------------------  IN: 376 mL / OUT: 222.5 mL / NET: 153.5 mL        Assessment  82y Male  w/ PAST MEDICAL & SURGICAL HISTORY:  Coronary artery disease: s/p 3 stents on June 30, 2017 at [place] Dr. Lavelle Reid  OA (osteoarthritis) of knee: right  Former smoker, stopped smoking in distant past  Former smoker  Rheumatoid arthritis  Seizure disorder: 1 episode approximately 15 yrs ago  Asthma  Hyperlipidemia  BPH (benign prostatic hyperplasia)  HTN (hypertension)  Esophagus cancer  Chronic allergic rhinitis  BCC (basal cell carcinoma): skin  Cerebrovascular accident (CVA)  Anxiety  History of tonsillectomy  H/O heart artery stent: June 30, 2017  Knee arthropathy: left  History of total hip replacement: left  History of hernia repair  admitted with complaints of Patient is a 82y old  Male who presents with a chief complaint of "they are going do surgery on esophagus" (11 Aug 2017 09:35)  .  On 8/14/17 patient underwent [facility] [facility] esophagectomy.Pt kept intubated postop8/19-8/20- inc leukocytosis, pan culture sent w C diff for diarrhea, started on Antibx. López replaced for retention. 8/21 Ba Swa showed sml leak, confirmed by CT of chest, Kept NPO. Keep Rt. Chest kyara to bulb on SS. 8/22-8/23- increased diarrhea, WBC improving, no fevers. 8/23- To 8T, TF changed to Twocal, increased to goal rate of 32cc/hrs. López dc'd->voiding. All cultures neg including Cdiff. Ok for Immodium per Dr. Weinstein Added free water. ALL MEDS IV, NOTHING VIA JTUBE OR ORAL. Diarrhea improving.  Plan to repeat CT scan this weekend    PLAN  Neuro: Pain management. Will call Psych consult per pt's request for insomnia and anxiety.   Pulm: Encourage coughing, deep breathing and use of incentive spirometry. Cont nebs, cont diuretics. Wean off supplemental oxygen as able. Daily CXR.   Cardio: Monitor telemetry/alarms  GI: Cont NPO. Cont TF at goal rate, monitor for diarrhea, imodium as needed. Will increase free water and stop IVF.   Renal: monitor urine output, supplement electrolytes as needed  Vasc: Heparin SC/SCDs for DVT prophylaxis  Heme: Stable H/H. .   ID: Off antibiotics.some increase in leukocytosis, will monitor.   Therapy: OOB/ambulate  Tubes: Monitor Chest tube output, keep kyara to SS.   Disposition:Plan for Rehab after the weekend.   Discussed with Cardiothoracic Team at AM rounds.

## 2017-08-24 NOTE — BEHAVIORAL HEALTH ASSESSMENT NOTE - HPI (INCLUDE ILLNESS QUALITY, SEVERITY, DURATION, TIMING, CONTEXT, MODIFYING FACTORS, ASSOCIATED SIGNS AND SYMPTOMS)
Pt is an 81yo M w/ PPHx anxiety (currently in treatment w/ outpatient psychiatrist) w/ hx of esophageal cancer admitted for elective robotic-assisted laparoscopic [place] [facility] esophagogastrectomy (POD #10) w/ feeding J-tube. Hospital course complicated by esophagram demonstrating contained esophageal leak. Throughout hospitalization pt has been intermittently delirious, but now AAOx3 and at baseline mental status. Pt has also complained of insomnia, given IV Benadryl last night with what appears to be a panic attack last night (became very confused, SOB, agitated). Pt reports that since hospitalization he has been unable to sleep for extended periods of time. He also reports feeling very overwhelmed by medical complications with resultant anxiety. He is frustrated by need for staff help with ADLs (including cleaning himself, walking, and going to the bathroom), as he normally cares for his ADLs independently at home. Pt reports that he has been prescribed (by Dr. Colten Gibbs in [facility]) Xanax 0.25mg QID for the past 15+ years to manage his anxiety, with good effect; however, pt has not been able to take this med while in the hospital as he has been NPO. Pt reports some depression, denies SI; reports primary concerns are insomnia and anxiety.

## 2017-08-24 NOTE — BEHAVIORAL HEALTH ASSESSMENT NOTE - NSBHCHARTREVIEWLAB_PSY_A_CORE FT
8.7    13.92 )-----------( 234      ( 24 Aug 2017 06:00 )             27.2     08-24    143  |  106  |  14  ----------------------------<  117<H>  3.5   |  24  |  0.81    Ca    9.1      24 Aug 2017 06:00  Phos  2.6     08-23  Mg     1.8     08-23

## 2017-08-24 NOTE — PROGRESS NOTE ADULT - SUBJECTIVE AND OBJECTIVE BOX
s/p robotic-assisted laparoscopic [place] [facility] esophagogastrectomy,    MEDICATIONS  (STANDING):  heparin  Injectable 5000 Unit(s) SubCutaneous every 8 hours  aspirin Suppository 300 milliGRAM(s) Rectal daily  phenytoin  IVPB 200 milliGRAM(s) IV Intermittent two times a day  dexamethasone  Injectable 1 milliGRAM(s) IV Push daily  pantoprazole  Injectable 40 milliGRAM(s) IV Push daily  dornase ziyad Solution 2.5 milliGRAM(s) Inhalation daily  furosemide   Injectable 10 milliGRAM(s) IV Push two times a day  ipratropium    for Nebulization 500 MICROGram(s) Nebulizer every 6 hours  LORazepam   Injectable 0.5 milliGRAM(s) IV Push two times a day    MEDICATIONS  (PRN):  ondansetron Injectable 4 milliGRAM(s) IV Push every 6 hours PRN Nausea  ALBUTerol    90 MICROgram(s) HFA Inhaler 2 Puff(s) Inhalation every 4 hours PRN Shortness of Breath  LORazepam   Injectable 0.5 milliGRAM(s) IV Push every 8 hours PRN Anxiety    Vital Signs Last 24 Hrs  T(C): 36.6 (24 Aug 2017 16:47), Max: 37.1 (23 Aug 2017 21:18)  T(F): 97.8 (24 Aug 2017 16:47), Max: 98.8 (23 Aug 2017 21:18)  HR: 80 (24 Aug 2017 16:47) (76 - 99)  BP: 100/79 (24 Aug 2017 16:47) (100/79 - 141/60)  BP(mean): --  RR: 18 (24 Aug 2017 16:47) (18 - 18)  SpO2: 100% (24 Aug 2017 16:47) (90% - 100%)    Constitutional: No fever, fatigue  Skin: No rash.  Eyes: No recent vision problems or eye pain.  ENT: No congestion, ear pain, or sore throat.  Cardiovascular: No chest pain or palpation.  Respiratory: No cough, shortness of breath, congestion, or wheezing.  Gastrointestinal: No abdominal pain, nausea, vomiting, or diarrhea.  Genitourinary: No dysuria.  Musculoskeletal: No joint swelling.  Neurologic: No headache.    HEENT :peerla, eomi  CVS : s1, s2+  RS :dec breath sounds at bases  ABD : soft, bs+  EXT :no edema  NEURO :alert awake oriented    LABS:      143  |  106  |  14  ----------------------------<  117<H>  3.5   |  24  |  0.81    Ca    9.1      24 Aug 2017 06:00  Phos  2.6     08-23  Mg     1.8     08      Creatinine Trend: 0.81 <--, 0.81 <--, 0.90 <--, 0.88 <--, 0.95 <--, 0.88 <--, 0.78 <--, 0.82 <--, 0.82 <--                        8.7    13.92 )-----------( 234      ( 24 Aug 2017 06:00 )             27.2     Urine Studies:  Urinalysis Basic - ( 20 Aug 2017 14:40 )    Color: YELLOW / Appearance: HAZY / S.022 / pH: 7.5  Gluc: NEGATIVE / Ketone: NEGATIVE  / Bili: SMALL / Urobili: 8 E.U.   Blood: LARGE / Protein: 100 / Nitrite: NEGATIVE   Leuk Esterase: SMALL / RBC: >50 / WBC 10-25   Sq Epi:  / Non Sq Epi:  / Bacteria: FEW

## 2017-08-24 NOTE — BEHAVIORAL HEALTH ASSESSMENT NOTE - SUMMARY
81yo M w/ PPHx anxiety w/ hx of esophageal cancer admitted for elective robotic-assisted laparoscopic [facility] esophagogastrectomy (POD #10) w/ feeding J-tube. Hospital course complicated by esophagram demonstrating contained esophageal leak. Pt reports severe anxiety and insomnia related to current medical problems and hospitalization. Of note, pt in outpt psych treatment, prescribed Xanax 0.25mg QID (for 15+ years), but has not been taking this while in the hospital due to NPO status. Current symptoms of anxiety and insomnia likely related to lifestyle changes (2/2 to surgery and hospitalization) in addition to benzo withdrawal. Will start pt on IV Ativan.

## 2017-08-24 NOTE — PROGRESS NOTE ADULT - ASSESSMENT
s/p [place] Trent esophagectomy for esophageal cancer     - - Prophylactic Antibiotics: cefepime, Flagyl, vancomycin  - Consider thoracentesis vs pig tail catheter for pleural effusions given respiratory status  - Esophagram demonstrates contained leak.- Tube feeds at goal, npo  - Chest PT

## 2017-08-24 NOTE — BEHAVIORAL HEALTH ASSESSMENT NOTE - CASE SUMMARY
83yo M w/ PPHx anxiety w/ hx of esophageal cancer admitted for elective robotic-assisted laparoscopic [facility] [facility] esophagogastrectomy (POD #10) w/ feeding J-tube. Hospital course complicated by esophagram demonstrating contained esophageal leak. Pt reports severe anxiety and insomnia related to current medical problems and hospitalization. Pt had been on Xanax 0.25mg QID at home (for 15+ years), but has not been taking this while in the hospital due to NPO status. Pt denies any sustained depressed mood, is hopeful and future-oriented, and denies any SI. Per wife pt had brief episodes of delirium while in ICU, but currently has a clear mental status, no psychotic or delirium sx. Impression: adjustment disorder with anxiety. Plan: as above- start standing Ativan 1mg IV BID, with ativan prns for anxiety. Will continue to follow closely for med management and support.

## 2017-08-24 NOTE — PROGRESS NOTE ADULT - SUBJECTIVE AND OBJECTIVE BOX
MEJIA RESTREPOREMBA:[id number],   82yMale followed for:  penicillin (Rash)    PAST MEDICAL & SURGICAL HISTORY:  Coronary artery disease: s/p 3 stents on June 30, 2017 at [place] Dr. Lavelle Reid  OA (osteoarthritis) of knee: right  Former smoker, stopped smoking in distant past  Former smoker  Rheumatoid arthritis  Seizure disorder: 1 episode approximately 15 yrs ago  Asthma  Hyperlipidemia  BPH (benign prostatic hyperplasia)  HTN (hypertension)  Esophagus cancer  Chronic allergic rhinitis  BCC (basal cell carcinoma): skin  Cerebrovascular accident (CVA)  Anxiety  History of tonsillectomy  H/O heart artery stent: June 30, 2017  Knee arthropathy: left  History of total hip replacement: left  History of hernia repair    FAMILY HISTORY:  Family history of heart attack (Sibling)  Family history of pulmonary embolism (Father): father @ 49 yr old  FH: CAD (coronary artery disease) (Mother)    MEDICATIONS  (STANDING):  heparin  Injectable 5000 Unit(s) SubCutaneous every 8 hours  aspirin Suppository 300 milliGRAM(s) Rectal daily  phenytoin  IVPB 200 milliGRAM(s) IV Intermittent two times a day  dexamethasone  Injectable 1 milliGRAM(s) IV Push daily  pantoprazole  Injectable 40 milliGRAM(s) IV Push daily  dornase ziyad Solution 2.5 milliGRAM(s) Inhalation daily  furosemide   Injectable 10 milliGRAM(s) IV Push two times a day  ipratropium    for Nebulization 500 MICROGram(s) Nebulizer every 6 hours  potassium chloride   Powder 40 milliEquivalent(s) Oral once    MEDICATIONS  (PRN):  ondansetron Injectable 4 milliGRAM(s) IV Push every 6 hours PRN Nausea  ALBUTerol    90 MICROgram(s) HFA Inhaler 2 Puff(s) Inhalation every 4 hours PRN Shortness of Breath  acetaminophen  IVPB. 1000 milliGRAM(s) IV Intermittent once PRN Moderate Pain (4 - 6)      Vital Signs Last 24 Hrs  T(C): 36.7 (24 Aug 2017 07:46), Max: 37.1 (23 Aug 2017 17:08)  T(F): 98.1 (24 Aug 2017 07:46), Max: 98.8 (23 Aug 2017 17:08)  HR: 99 (24 Aug 2017 07:46) (76 - 99)  BP: 133/35 (24 Aug 2017 07:46) (122/46 - 144/72)  BP(mean): 66 (23 Aug 2017 13:00) (63 - 66)  RR: 18 (24 Aug 2017 07:46) (18 - 26)  SpO2: 90% (24 Aug 2017 07:46) (90% - 100%)  nc/at  s1s2  cta  soft, nt, nd no guarding or rebound  no c/c/e    CBC Full  -  ( 24 Aug 2017 06:00 )  WBC Count : 13.92 K/uL  Hemoglobin : 8.7 g/dL  Hematocrit : 27.2 %  Platelet Count - Automated : 234 K/uL  Mean Cell Volume : 96.8 fL  Mean Cell Hemoglobin : 31.0 pg  Mean Cell Hemoglobin Concentration : 32.0 %  Auto Neutrophil # : x  Auto Lymphocyte # : x  Auto Monocyte # : x  Auto Eosinophil # : x  Auto Basophil # : x  Auto Neutrophil % : x  Auto Lymphocyte % : x  Auto Monocyte % : x  Auto Eosinophil % : x  Auto Basophil % : x    08-24    143  |  106  |  14  ----------------------------<  117<H>  3.5   |  24  |  0.81    Ca    9.1      24 Aug 2017 06:00  Phos  2.6     08-23  Mg     1.8     08-23

## 2017-08-24 NOTE — BEHAVIORAL HEALTH ASSESSMENT NOTE - RISK ASSESSMENT
Pt low risk. No hx of past psych hospitalizations or SAs. Pt denies SI, has strong social supports, and is future-oriented. Risk factors include current anxiety, insomnia, and acute medical illness (with guarded prognosis). Pt does not require psych hospitalization, can continue to follow-up with outpatient psychiatrist.

## 2017-08-24 NOTE — PROGRESS NOTE ADULT - ASSESSMENT
Assessment/Plan: 82yMale s/p [place] Trent w/ feeding J-tube. Esophagram demonstrated contained leak. Otherwise clinically unchanged. C diff. neg. Diarrhea may be 2/2 residual contrast in bowel. Empiric antibiotics stopped due to lack of infection source and resolution of elevated WBC.    - Cont to monitor diarrhea  - Cont TF  - Pain control  - VTE ppx    Jacky Kay M.D.

## 2017-08-24 NOTE — BEHAVIORAL HEALTH ASSESSMENT NOTE - NSBHCHARTREVIEWINVESTIGATE_PSY_A_CORE FT
8/11/17:   Ventricular Rate 75 BPM    Atrial Rate 75 BPM    P-R Interval 186 ms    QRS Duration 140 ms    Q-T Interval 434 ms    QTC Calculation(Bezet) 484 ms    P Axis 88 degrees    R Axis -64 degrees    T Axis 65 degrees    Diagnosis Line Normal sinus rhythm  Right bundle branch block  Left anterior fascicular block  *** Bifascicular block ***  Abnormal ECG

## 2017-08-24 NOTE — PROGRESS NOTE ADULT - SUBJECTIVE AND OBJECTIVE BOX
C Team Progress Note    S: No acute events overnight. Patient uncomfortable when examined because tube feed connection leaking on him; evaluated by primary team. Pain well controlled. Denies fevers/chills and N/V.    O:  T(C): 36.9 (08-24-17 @ 11:56), Max: 37.1 (08-23-17 @ 17:08)  HR: 87 (08-24-17 @ 11:56) (76 - 99)  BP: 134/45 (08-24-17 @ 11:56) (129/52 - 141/60)  RR: 18 (08-24-17 @ 11:56) (18 - 19)  SpO2: 91% (08-24-17 @ 11:56) (90% - 100%)  Wt(kg): --    08-23 @ 07:01  -  08-24 @ 07:00  --------------------------------------------------------  IN:    dextrose 5% + sodium chloride 0.9% with potassium chloride 20 mEq/L: 210 mL    Enteral Tube Flush: 40 mL    TwoCal HN: 30 mL  Total IN: 280 mL    OUT:    Bulb: 85 mL    Indwelling Catheter - Urethral: 30 mL    Voided: 1300 mL  Total OUT: 1415 mL    Total NET: -1135 mL      08-24 @ 07:01  -  08-24 @ 15:39  --------------------------------------------------------  IN:    Enteral Tube Flush: 280 mL    TwoCal HN: 96 mL  Total IN: 376 mL    OUT:    Bulb: 22.5 mL    Voided: 200 mL  Total OUT: 222.5 mL    Total NET: 153.5 mL        CBC Full  -  ( 24 Aug 2017 06:00 )  WBC Count : 13.92 K/uL  Hemoglobin : 8.7 g/dL  Hematocrit : 27.2 %  Platelet Count - Automated : 234 K/uL  Mean Cell Volume : 96.8 fL  Mean Cell Hemoglobin : 31.0 pg  Mean Cell Hemoglobin Concentration : 32.0 %        PHYSICAL EXAM:  Gen: A&Ox3, laying in bed in NAD  Abdomen: soft, NT, ND  Lines: J tube intact but leaking at connection point, GINA drain without minimal serous output

## 2017-08-24 NOTE — BEHAVIORAL HEALTH ASSESSMENT NOTE - NSBHCHARTREVIEWVS_PSY_A_CORE FT
Vital Signs Last 24 Hrs  T(C): 36.7 (24 Aug 2017 07:46), Max: 37.1 (23 Aug 2017 17:08)  T(F): 98.1 (24 Aug 2017 07:46), Max: 98.8 (23 Aug 2017 17:08)  HR: 85 (24 Aug 2017 11:28) (76 - 99)  BP: 133/35 (24 Aug 2017 07:46) (122/46 - 144/72)  BP(mean): 66 (23 Aug 2017 13:00) (63 - 66)  RR: 18 (24 Aug 2017 07:46) (18 - 26)  SpO2: 97% (24 Aug 2017 11:28) (90% - 100%)

## 2017-08-24 NOTE — BEHAVIORAL HEALTH ASSESSMENT NOTE - OTHER PAST PSYCHIATRIC HISTORY (INCLUDE DETAILS REGARDING ONSET, COURSE OF ILLNESS, INPATIENT/OUTPATIENT TREATMENT)
In outpt treatment with Dr. Colten Gibbs, prescribed Xanax 0.25mg QID. 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 No prior psych hospitalizations or SAs.

## 2017-08-25 LAB
BACTERIA BLD CULT: SIGNIFICANT CHANGE UP
BUN SERPL-MCNC: 15 MG/DL — SIGNIFICANT CHANGE UP (ref 7–23)
CALCIUM SERPL-MCNC: 8.9 MG/DL — SIGNIFICANT CHANGE UP (ref 8.4–10.5)
CHLORIDE SERPL-SCNC: 108 MMOL/L — HIGH (ref 98–107)
CO2 SERPL-SCNC: 28 MMOL/L — SIGNIFICANT CHANGE UP (ref 22–31)
CREAT SERPL-MCNC: 0.7 MG/DL — SIGNIFICANT CHANGE UP (ref 0.5–1.3)
GLUCOSE SERPL-MCNC: 98 MG/DL — SIGNIFICANT CHANGE UP (ref 70–99)
GRAM STAIN BLOOD: SIGNIFICANT CHANGE UP
HCT VFR BLD CALC: 25.8 % — LOW (ref 39–50)
HGB BLD-MCNC: 8 G/DL — LOW (ref 13–17)
MCHC RBC-ENTMCNC: 30.7 PG — SIGNIFICANT CHANGE UP (ref 27–34)
MCHC RBC-ENTMCNC: 31 % — LOW (ref 32–36)
MCV RBC AUTO: 98.9 FL — SIGNIFICANT CHANGE UP (ref 80–100)
METHOD TYPE: SIGNIFICANT CHANGE UP
NRBC # FLD: 0 — SIGNIFICANT CHANGE UP
ORGANISM # SPEC MICROSCOPIC CNT: SIGNIFICANT CHANGE UP
ORGANISM # SPEC MICROSCOPIC CNT: SIGNIFICANT CHANGE UP
PHENYTOIN FREE SERPL-MCNC: 2.7 UG/ML — LOW (ref 10–20)
PLATELET # BLD AUTO: 235 K/UL — SIGNIFICANT CHANGE UP (ref 150–400)
PMV BLD: 9.9 FL — SIGNIFICANT CHANGE UP (ref 7–13)
POTASSIUM SERPL-MCNC: 3.9 MMOL/L — SIGNIFICANT CHANGE UP (ref 3.5–5.3)
POTASSIUM SERPL-SCNC: 3.9 MMOL/L — SIGNIFICANT CHANGE UP (ref 3.5–5.3)
RBC # BLD: 2.61 M/UL — LOW (ref 4.2–5.8)
RBC # FLD: 13.6 % — SIGNIFICANT CHANGE UP (ref 10.3–14.5)
SODIUM SERPL-SCNC: 144 MMOL/L — SIGNIFICANT CHANGE UP (ref 135–145)
WBC # BLD: 11.16 K/UL — HIGH (ref 3.8–10.5)
WBC # FLD AUTO: 11.16 K/UL — HIGH (ref 3.8–10.5)

## 2017-08-25 PROCEDURE — 99232 SBSQ HOSP IP/OBS MODERATE 35: CPT

## 2017-08-25 PROCEDURE — 71010: CPT | Mod: 26

## 2017-08-25 RX ADMIN — Medication 500 MICROGRAM(S): at 04:51

## 2017-08-25 RX ADMIN — PANTOPRAZOLE SODIUM 40 MILLIGRAM(S): 20 TABLET, DELAYED RELEASE ORAL at 11:51

## 2017-08-25 RX ADMIN — HEPARIN SODIUM 5000 UNIT(S): 5000 INJECTION INTRAVENOUS; SUBCUTANEOUS at 13:51

## 2017-08-25 RX ADMIN — Medication 500 MICROGRAM(S): at 21:09

## 2017-08-25 RX ADMIN — Medication 0.5 MILLIGRAM(S): at 23:37

## 2017-08-25 RX ADMIN — Medication 108 MILLIGRAM(S): at 21:37

## 2017-08-25 RX ADMIN — Medication 10 MILLIGRAM(S): at 18:09

## 2017-08-25 RX ADMIN — Medication 500 MICROGRAM(S): at 16:44

## 2017-08-25 RX ADMIN — Medication 108 MILLIGRAM(S): at 11:51

## 2017-08-25 RX ADMIN — Medication 500 MICROGRAM(S): at 11:03

## 2017-08-25 RX ADMIN — Medication 300 MILLIGRAM(S): at 11:52

## 2017-08-25 RX ADMIN — Medication 270 MILLIGRAM(S): at 09:47

## 2017-08-25 RX ADMIN — Medication 0.5 MILLIGRAM(S): at 05:43

## 2017-08-25 RX ADMIN — Medication 0.5 MILLIGRAM(S): at 18:07

## 2017-08-25 RX ADMIN — Medication 1 MILLIGRAM(S): at 05:43

## 2017-08-25 RX ADMIN — HEPARIN SODIUM 5000 UNIT(S): 5000 INJECTION INTRAVENOUS; SUBCUTANEOUS at 05:42

## 2017-08-25 RX ADMIN — HEPARIN SODIUM 5000 UNIT(S): 5000 INJECTION INTRAVENOUS; SUBCUTANEOUS at 21:37

## 2017-08-25 RX ADMIN — Medication 10 MILLIGRAM(S): at 05:42

## 2017-08-25 RX ADMIN — DORNASE ALFA 2.5 MILLIGRAM(S): 1 SOLUTION RESPIRATORY (INHALATION) at 11:23

## 2017-08-25 NOTE — PROGRESS NOTE ADULT - ASSESSMENT
s/p [place] Trent esophagectomy for esophageal cancer   Cont TF  - Pain control  - ID consult for antibiotcs  -Repeat blood cultures daily until negative  - VTE ppx  - -

## 2017-08-25 NOTE — PROGRESS NOTE ADULT - SUBJECTIVE AND OBJECTIVE BOX
MEJIA RESTREPOREMBA:[id number],   82yMale followed for: esophageal ca  penicillin (Rash)    PAST MEDICAL & SURGICAL HISTORY:  Coronary artery disease: s/p 3 stents on June 30, 2017 at [place] Dr. Lavelle Reid  OA (osteoarthritis) of knee: right  Former smoker, stopped smoking in distant past  Former smoker  Rheumatoid arthritis  Seizure disorder: 1 episode approximately 15 yrs ago  Asthma  Hyperlipidemia  BPH (benign prostatic hyperplasia)  HTN (hypertension)  Esophagus cancer  Chronic allergic rhinitis  BCC (basal cell carcinoma): skin  Cerebrovascular accident (CVA)  Anxiety  History of tonsillectomy  H/O heart artery stent: June 30, 2017  Knee arthropathy: left  History of total hip replacement: left  History of hernia repair    FAMILY HISTORY:  Family history of heart attack (Sibling)  Family history of pulmonary embolism (Father): father @ 49 yr old  FH: CAD (coronary artery disease) (Mother)    MEDICATIONS  (STANDING):  heparin  Injectable 5000 Unit(s) SubCutaneous every 8 hours  aspirin Suppository 300 milliGRAM(s) Rectal daily  phenytoin  IVPB 200 milliGRAM(s) IV Intermittent two times a day  dexamethasone  Injectable 1 milliGRAM(s) IV Push daily  pantoprazole  Injectable 40 milliGRAM(s) IV Push daily  dornase ziyad Solution 2.5 milliGRAM(s) Inhalation daily  furosemide   Injectable 10 milliGRAM(s) IV Push two times a day  ipratropium    for Nebulization 500 MICROGram(s) Nebulizer every 6 hours  LORazepam   Injectable 0.5 milliGRAM(s) IV Push two times a day    MEDICATIONS  (PRN):  ondansetron Injectable 4 milliGRAM(s) IV Push every 6 hours PRN Nausea  ALBUTerol    90 MICROgram(s) HFA Inhaler 2 Puff(s) Inhalation every 4 hours PRN Shortness of Breath  LORazepam   Injectable 0.5 milliGRAM(s) IV Push every 8 hours PRN Anxiety      Vital Signs Last 24 Hrs  T(C): 36.9 (25 Aug 2017 05:41), Max: 36.9 (24 Aug 2017 11:56)  T(F): 98.4 (25 Aug 2017 05:41), Max: 98.5 (24 Aug 2017 11:56)  HR: 90 (25 Aug 2017 05:41) (72 - 99)  BP: 111/59 (25 Aug 2017 05:41) (100/79 - 139/56)  BP(mean): --  RR: 18 (25 Aug 2017 05:41) (17 - 18)  SpO2: 99% (25 Aug 2017 05:41) (90% - 100%)  nc/at  s1s2  cta  soft, nt, nd no guarding or rebound  no c/c/e    CBC Full  -  ( 25 Aug 2017 05:54 )  WBC Count : 11.16 K/uL  Hemoglobin : 8.0 g/dL  Hematocrit : 25.8 %  Platelet Count - Automated : 235 K/uL  Mean Cell Volume : 98.9 fL  Mean Cell Hemoglobin : 30.7 pg  Mean Cell Hemoglobin Concentration : 31.0 %  Auto Neutrophil # : x  Auto Lymphocyte # : x  Auto Monocyte # : x  Auto Eosinophil # : x  Auto Basophil # : x  Auto Neutrophil % : x  Auto Lymphocyte % : x  Auto Monocyte % : x  Auto Eosinophil % : x  Auto Basophil % : x    08-24    143  |  106  |  14  ----------------------------<  117<H>  3.5   |  24  |  0.81    Ca    9.1      24 Aug 2017 06:00

## 2017-08-25 NOTE — PROGRESS NOTE ADULT - SUBJECTIVE AND OBJECTIVE BOX
Subjective:    Vital Signs: Hemodynamically stable and afebrile  Vital Signs Last 24 Hrs  T(C): 37.1 (08-25-17 @ 17:09), Max: 37.1 (08-25-17 @ 17:09)  T(F): 98.7 (08-25-17 @ 17:09), Max: 98.7 (08-25-17 @ 17:09)  HR: 79 (08-25-17 @ 17:09) (72 - 93)  BP: 100/51 (08-25-17 @ 17:09) (91/38 - 139/56)  RR: 16 (08-25-17 @ 17:09) (16 - 19)  SpO2: 100% (08-25-17 @ 17:09) (95% - 100%) 3liter on (O2)    Telemetry/Alarms: NSR  General: WN/WD NAD  Neurology: Awake, nonfocal, MAI x 4  Eyes: Scleras clear, PERRLA/ EOMI, Gross vision intact  ENT:Gross hearing intact, grossly patent pharynx, no stridor  Neck: Neck supple, trachea midline, No JVD,   Respiratory: Decreased B/L, a few scattered rhonchi  CV: RRR, S1S2, no murmurs, rubs or gallops  Abdominal: Soft, NT, ND +BS,   Extremities: No edema, + peripheral pulses, no calf pain or tendernes + rt ankle and pedal edema 3+ to 4+  Skin: No Rashes, Hematoma, Ecchymosis  Lymphatic: No Neck, axilla, groin LAD  Psych: Oriented x 3, normal affect  Incisions: No s-s of infection  Tubes: J. Tube site-> clean, dry, tube patent. Tolerating tube feeding    Relevant labs, radiology->Continued perihilar opacities consistent with pulmonary edema. Small left pleural effusion. Occasional diverticula of x-ray abdomen was  ordered as prior day] and Evidence of right midlung loculated effusion in  the fissure and Medications reviewed                        8.0    11.16 )-----------( 235      ( 25 Aug 2017 05:54 )             25.8     08-25    144  |  108<H>  |  15  ----------------------------<  98  3.9   |  28  |  0.70    Ca    8.9      25 Aug 2017 05:54        MEDICATIONS  (STANDING):  heparin  Injectable 5000 Unit(s) SubCutaneous every 8 hours  aspirin Suppository 300 milliGRAM(s) Rectal daily  phenytoin  IVPB 200 milliGRAM(s) IV Intermittent two times a day  dexamethasone  Injectable 1 milliGRAM(s) IV Push daily  pantoprazole  Injectable 40 milliGRAM(s) IV Push daily  dornase ziyad Solution 2.5 milliGRAM(s) Inhalation daily  furosemide   Injectable 10 milliGRAM(s) IV Push two times a day  ipratropium    for Nebulization 500 MICROGram(s) Nebulizer every 6 hours  LORazepam   Injectable 0.5 milliGRAM(s) IV Push two times a day    MEDICATIONS  (PRN):  ondansetron Injectable 4 milliGRAM(s) IV Push every 6 hours PRN Nausea  ALBUTerol    90 MICROgram(s) HFA Inhaler 2 Puff(s) Inhalation every 4 hours PRN Shortness of Breath  LORazepam   Injectable 0.5 milliGRAM(s) IV Push every 8 hours PRN Anxiety    Pertinent Physical Exam  I&O's Summary    24 Aug 2017 07:01  -  25 Aug 2017 07:00  --------------------------------------------------------  IN: 1734 mL / OUT: 767.5 mL / NET: 966.5 mL    25 Aug 2017 07:01  -  25 Aug 2017 19:21  --------------------------------------------------------  IN: 1164 mL / OUT: 455 mL / NET: 709 mL        Assessment  82y Male  w/ PAST MEDICAL & SURGICAL HISTORY:  Coronary artery disease: s/p 3 stents on June 30, 2017 at [place] Dr. Lavelle Reid  OA (osteoarthritis) of knee: right  Former smoker, stopped smoking in distant past  Former smoker  Rheumatoid arthritis  Seizure disorder: 1 episode approximately 15 yrs ago  Asthma  Hyperlipidemia  BPH (benign prostatic hyperplasia)  HTN (hypertension)  Esophagus cancer  Chronic allergic rhinitis  BCC (basal cell carcinoma): skin  Cerebrovascular accident (CVA)  Anxiety  History of tonsillectomy  H/O heart artery stent: June 30, 2017  Knee arthropathy: left  History of total hip replacement: left  History of hernia repair  admitted with complaints of Patient is a 82y old  Male who presents with a chief complaint of "they are going do surgery on esophagus" (11 Aug 2017 09:35)  .  On (Date), patient underwent [place] Trent esophagectomy  . Postoperative course/issues:    PLAN  Tube Feed 18hrs 6pm to 12oon  Dilantin level in am  Neuro: Pain management  Pulm: Encourage coughing, deep breathing and use of incentive spirometry. Wean off supplemental oxygen as able. Daily CXR.   Cardio: Monitor telemetry/alarms  GI: Tolerating diet. Continue stool softeners.  Renal: monitor urine output, supplement electrolytes as needed  Vasc: Heparin SC/SCDs for DVT prophylaxis  Heme: Stable H/H. .   ID: Off antibiotics. Stable.  Therapy: OOB/ambulate  Tubes: Monitor Chest tube output  Disposition: Aim to D/C to home on  Discussed with Cardiothoracic Team at AM rounds.

## 2017-08-25 NOTE — PROGRESS NOTE ADULT - ASSESSMENT
82yMale s/p [place] Trent w/ feeding J-tube. Esophagram demonstrated contained leak. Otherwise clinically unchanged. Blood cultures from 5 days ago reveal gram negative rods.    - Cont TF  - Pain control  - ID consult for antibiotcs  -Repeat blood cultures daily until negative  - VTE ppx

## 2017-08-25 NOTE — PROGRESS NOTE ADULT - SUBJECTIVE AND OBJECTIVE BOX
Morning Surgical Progress Note  Patient is a 82y old  Male who presents with a chief complaint of "they are going do surgery on esophagus" (11 Aug 2017 09:35)    SUBJECTIVE: Patient seen and examined at bedside with surgical team, patient complaining of cough. Denies nausea and vomiting    Vital Signs Last 24 Hrs  T(C): 36.8 (25 Aug 2017 09:33), Max: 36.9 (24 Aug 2017 11:56)  T(F): 98.2 (25 Aug 2017 09:33), Max: 98.5 (24 Aug 2017 11:56)  HR: 90 (25 Aug 2017 09:33) (72 - 93)  BP: 107/59 (25 Aug 2017 09:33) (100/79 - 139/56)  BP(mean): --  RR: 18 (25 Aug 2017 09:33) (17 - 18)  SpO2: 100% (25 Aug 2017 09:33) (91% - 100%)  I&O's Detail    24 Aug 2017 07:01  -  25 Aug 2017 07:00  --------------------------------------------------------  IN:    Enteral Tube Flush: 780 mL    IV PiggyBack: 250 mL    TwoCal HN: 704 mL  Total IN: 1734 mL    OUT:    Bulb: 102.5 mL    Voided: 665 mL  Total OUT: 767.5 mL    Total NET: 966.5 mL      25 Aug 2017 07:01  -  25 Aug 2017 10:32  --------------------------------------------------------  IN:    IV PiggyBack: 250 mL  Total IN: 250 mL    OUT:    Bulb: 30 mL    Voided: 100 mL  Total OUT: 130 mL    Total NET: 120 mL        MEDICATIONS  (STANDING):  heparin  Injectable 5000 Unit(s) SubCutaneous every 8 hours  aspirin Suppository 300 milliGRAM(s) Rectal daily  phenytoin  IVPB 200 milliGRAM(s) IV Intermittent two times a day  dexamethasone  Injectable 1 milliGRAM(s) IV Push daily  pantoprazole  Injectable 40 milliGRAM(s) IV Push daily  dornase ziyad Solution 2.5 milliGRAM(s) Inhalation daily  furosemide   Injectable 10 milliGRAM(s) IV Push two times a day  ipratropium    for Nebulization 500 MICROGram(s) Nebulizer every 6 hours  LORazepam   Injectable 0.5 milliGRAM(s) IV Push two times a day    MEDICATIONS  (PRN):  ondansetron Injectable 4 milliGRAM(s) IV Push every 6 hours PRN Nausea  ALBUTerol    90 MICROgram(s) HFA Inhaler 2 Puff(s) Inhalation every 4 hours PRN Shortness of Breath  LORazepam   Injectable 0.5 milliGRAM(s) IV Push every 8 hours PRN Anxiety      Physical Exam  General: A&Ox3, NAD, on 3L NC, sitting up in chair  Abdominal: soft nontender nondistended GINA drain less bilious today appears merky     LABS:                        8.0    11.16 )-----------( 235      ( 25 Aug 2017 05:54 )             25.8     08-25    144  |  108<H>  |  15  ----------------------------<  98  3.9   |  28  |  0.70    Ca    8.9      25 Aug 2017 05:54

## 2017-08-25 NOTE — PROGRESS NOTE BEHAVIORAL HEALTH - NSBHFUPINTERVALHXFT_PSY_A_CORE
Pt seen at bedside with his wife. Yesterday at noon pt given prn Ativan 0.5mg and pt slept for the rest of the afternoon/evening; per primary team, standing Ativan changed from 1mg BID to 0.5mg BID (due to concern for over-sedation). Pt did not require evening standing dose of Ativan last night due to continued sedation. Pt reports feeling much better this morning; anxiety decreased significantly and slept very well last night. Pt reports feeling less overwhelmed. Per patient's wife and nursing staff, pt somewhat confused yesterday after dose of Ativan. This morning pt is awake and alert, oriented x3 and answering all questions appropriately.

## 2017-08-25 NOTE — PROGRESS NOTE BEHAVIORAL HEALTH - CASE SUMMARY
Pt seen in follow-up; pt reports feeling much less anxious today. Plan as above- continue Ativan 0.5mg IV BID with prns for breakthrough anxiety. Will continue to follow.

## 2017-08-25 NOTE — PROGRESS NOTE ADULT - SUBJECTIVE AND OBJECTIVE BOX
s/p robotic-assisted laparoscopic [place] esophagogastrectomy,      MEDICATIONS  (STANDING):  heparin  Injectable 5000 Unit(s) SubCutaneous every 8 hours  aspirin Suppository 300 milliGRAM(s) Rectal daily  phenytoin  IVPB 200 milliGRAM(s) IV Intermittent two times a day  dexamethasone  Injectable 1 milliGRAM(s) IV Push daily  pantoprazole  Injectable 40 milliGRAM(s) IV Push daily  dornase ziyad Solution 2.5 milliGRAM(s) Inhalation daily  furosemide   Injectable 10 milliGRAM(s) IV Push two times a day  ipratropium    for Nebulization 500 MICROGram(s) Nebulizer every 6 hours  LORazepam   Injectable 0.5 milliGRAM(s) IV Push two times a day    MEDICATIONS  (PRN):  ondansetron Injectable 4 milliGRAM(s) IV Push every 6 hours PRN Nausea  ALBUTerol    90 MICROgram(s) HFA Inhaler 2 Puff(s) Inhalation every 4 hours PRN Shortness of Breath  LORazepam   Injectable 0.5 milliGRAM(s) IV Push every 8 hours PRN Anxiety    Vital Signs Last 24 Hrs  T(C): 37.1 (25 Aug 2017 17:09), Max: 37.1 (25 Aug 2017 17:09)  T(F): 98.7 (25 Aug 2017 17:09), Max: 98.7 (25 Aug 2017 17:09)  HR: 76 (25 Aug 2017 21:09) (76 - 93)  BP: 100/51 (25 Aug 2017 17:09) (91/38 - 121/50)  BP(mean): --  RR: 16 (25 Aug 2017 17:09) (16 - 19)  SpO2: 96% (25 Aug 2017 21:09) (95% - 100%)    Constitutional: No fever, fatigue  Skin: No rash.  Eyes: No recent vision problems or eye pain.  ENT: No congestion, ear pain, or sore throat.  Cardiovascular: No chest pain or palpation.  Respiratory: No cough, shortness of breath, congestion, or wheezing.  Gastrointestinal: No abdominal pain, nausea, vomiting, or diarrhea.  Genitourinary: No dysuria.  Musculoskeletal: No joint swelling.  Neurologic: No headache.    HEENT :peerla, eomi  CVS : s1, s2+  RS :dec breath sounds at bases  ABD : soft, bs+  EXT :no edema  NEURO :alert awake oriented    LABS:      144  |  108<H>  |  15  ----------------------------<  98  3.9   |  28  |  0.70    Ca    8.9      25 Aug 2017 05:54      Creatinine Trend: 0.70 <--, 0.81 <--, 0.81 <--, 0.90 <--, 0.88 <--, 0.95 <--, 0.88 <--, 0.78 <--                        8.0    11.16 )-----------( 235      ( 25 Aug 2017 05:54 )             25.8     Urine Studies:  Urinalysis Basic - ( 20 Aug 2017 14:40 )    Color: YELLOW / Appearance: HAZY / S.022 / pH: 7.5  Gluc: NEGATIVE / Ketone: NEGATIVE  / Bili: SMALL / Urobili: 8 E.U.   Blood: LARGE / Protein: 100 / Nitrite: NEGATIVE   Leuk Esterase: SMALL / RBC: >50 / WBC 10-25   Sq Epi:  / Non Sq Epi:  / Bacteria: FEW

## 2017-08-26 LAB
-  CANDIDA ALBICANS: SIGNIFICANT CHANGE UP
-  CANDIDA GLABRATA: SIGNIFICANT CHANGE UP
-  CANDIDA KRUSEI: SIGNIFICANT CHANGE UP
-  CANDIDA PARAPSILOSIS: SIGNIFICANT CHANGE UP
-  CANDIDA TROPICALIS: SIGNIFICANT CHANGE UP
-  COAGULASE NEGATIVE STAPHYLOCOCCUS: SIGNIFICANT CHANGE UP
-  K. PNEUMONIAE GROUP: SIGNIFICANT CHANGE UP
-  KPC RESISTANCE GENE: SIGNIFICANT CHANGE UP
-  STREPTOCOCCUS SP. (NOT GRP A, B OR S PNEUMONIAE): SIGNIFICANT CHANGE UP
A BAUMANNII DNA SPEC QL NAA+PROBE: SIGNIFICANT CHANGE UP
BACTERIA BLD CULT: SIGNIFICANT CHANGE UP
BUN SERPL-MCNC: 17 MG/DL — SIGNIFICANT CHANGE UP (ref 7–23)
CALCIUM SERPL-MCNC: 8.7 MG/DL — SIGNIFICANT CHANGE UP (ref 8.4–10.5)
CHLORIDE SERPL-SCNC: 107 MMOL/L — SIGNIFICANT CHANGE UP (ref 98–107)
CO2 SERPL-SCNC: 30 MMOL/L — SIGNIFICANT CHANGE UP (ref 22–31)
CREAT SERPL-MCNC: 0.68 MG/DL — SIGNIFICANT CHANGE UP (ref 0.5–1.3)
E CLOAC COMP DNA BLD POS QL NAA+PROBE: SIGNIFICANT CHANGE UP
E COLI DNA BLD POS QL NAA+NON-PROBE: SIGNIFICANT CHANGE UP
ENTEROCOC DNA BLD POS QL NAA+NON-PROBE: SIGNIFICANT CHANGE UP
ENTEROCOC DNA BLD POS QL NAA+NON-PROBE: SIGNIFICANT CHANGE UP
GLUCOSE SERPL-MCNC: 130 MG/DL — HIGH (ref 70–99)
GP B STREP DNA BLD POS QL NAA+NON-PROBE: SIGNIFICANT CHANGE UP
HAEM INFLU DNA BLD POS QL NAA+NON-PROBE: SIGNIFICANT CHANGE UP
HCT VFR BLD CALC: 26.4 % — LOW (ref 39–50)
HGB BLD-MCNC: 8.3 G/DL — LOW (ref 13–17)
K OXYTOCA DNA BLD POS QL NAA+NON-PROBE: SIGNIFICANT CHANGE UP
L MONOCYTOG DNA BLD POS QL NAA+NON-PROBE: SIGNIFICANT CHANGE UP
MCHC RBC-ENTMCNC: 31.4 % — LOW (ref 32–36)
MCHC RBC-ENTMCNC: 31.6 PG — SIGNIFICANT CHANGE UP (ref 27–34)
MCV RBC AUTO: 100.4 FL — HIGH (ref 80–100)
MRSA SPEC QL CULT: SIGNIFICANT CHANGE UP
MSSA DNA SPEC QL NAA+PROBE: SIGNIFICANT CHANGE UP
N MEN ISLT CULT: SIGNIFICANT CHANGE UP
NRBC # FLD: 0 — SIGNIFICANT CHANGE UP
ORGANISM # SPEC MICROSCOPIC CNT: SIGNIFICANT CHANGE UP
ORGANISM # SPEC MICROSCOPIC CNT: SIGNIFICANT CHANGE UP
P AERUGINOSA DNA BLD POS NAA+NON-PROBE: SIGNIFICANT CHANGE UP
PHENYTOIN FREE SERPL-MCNC: 8.8 UG/ML — LOW (ref 10–20)
PLATELET # BLD AUTO: 236 K/UL — SIGNIFICANT CHANGE UP (ref 150–400)
PMV BLD: 9.6 FL — SIGNIFICANT CHANGE UP (ref 7–13)
POTASSIUM SERPL-MCNC: 3.8 MMOL/L — SIGNIFICANT CHANGE UP (ref 3.5–5.3)
POTASSIUM SERPL-SCNC: 3.8 MMOL/L — SIGNIFICANT CHANGE UP (ref 3.5–5.3)
PROTEUS SP DNA BLD POS QL NAA+NON-PROBE: SIGNIFICANT CHANGE UP
RBC # BLD: 2.63 M/UL — LOW (ref 4.2–5.8)
RBC # FLD: 13.6 % — SIGNIFICANT CHANGE UP (ref 10.3–14.5)
S MARCESCENS DNA BLD POS NAA+NON-PROBE: SIGNIFICANT CHANGE UP
S PNEUM DNA BLD POS QL NAA+NON-PROBE: SIGNIFICANT CHANGE UP
S PYO DNA BLD POS QL NAA+NON-PROBE: SIGNIFICANT CHANGE UP
SODIUM SERPL-SCNC: 146 MMOL/L — HIGH (ref 135–145)
WBC # BLD: 13.12 K/UL — HIGH (ref 3.8–10.5)
WBC # FLD AUTO: 13.12 K/UL — HIGH (ref 3.8–10.5)

## 2017-08-26 PROCEDURE — 71250 CT THORAX DX C-: CPT | Mod: 26

## 2017-08-26 RX ORDER — IPRATROPIUM/ALBUTEROL SULFATE 18-103MCG
3 AEROSOL WITH ADAPTER (GRAM) INHALATION EVERY 6 HOURS
Qty: 0 | Refills: 0 | Status: DISCONTINUED | OUTPATIENT
Start: 2017-08-26 | End: 2017-08-28

## 2017-08-26 RX ORDER — SODIUM CHLORIDE 9 MG/ML
500 INJECTION INTRAMUSCULAR; INTRAVENOUS; SUBCUTANEOUS ONCE
Qty: 0 | Refills: 0 | Status: COMPLETED | OUTPATIENT
Start: 2017-08-26 | End: 2017-08-26

## 2017-08-26 RX ADMIN — HEPARIN SODIUM 5000 UNIT(S): 5000 INJECTION INTRAVENOUS; SUBCUTANEOUS at 13:14

## 2017-08-26 RX ADMIN — Medication 108 MILLIGRAM(S): at 21:48

## 2017-08-26 RX ADMIN — Medication 3 MILLILITER(S): at 22:04

## 2017-08-26 RX ADMIN — PANTOPRAZOLE SODIUM 40 MILLIGRAM(S): 20 TABLET, DELAYED RELEASE ORAL at 15:05

## 2017-08-26 RX ADMIN — HEPARIN SODIUM 5000 UNIT(S): 5000 INJECTION INTRAVENOUS; SUBCUTANEOUS at 21:49

## 2017-08-26 RX ADMIN — Medication 300 MILLIGRAM(S): at 17:33

## 2017-08-26 RX ADMIN — Medication 1 MILLIGRAM(S): at 05:07

## 2017-08-26 RX ADMIN — Medication 0.5 MILLIGRAM(S): at 15:31

## 2017-08-26 RX ADMIN — Medication 3 MILLILITER(S): at 15:05

## 2017-08-26 RX ADMIN — Medication 0.5 MILLIGRAM(S): at 05:10

## 2017-08-26 RX ADMIN — HEPARIN SODIUM 5000 UNIT(S): 5000 INJECTION INTRAVENOUS; SUBCUTANEOUS at 05:10

## 2017-08-26 RX ADMIN — Medication 10 MILLIGRAM(S): at 05:08

## 2017-08-26 RX ADMIN — Medication 108 MILLIGRAM(S): at 15:06

## 2017-08-26 RX ADMIN — SODIUM CHLORIDE 1000 MILLILITER(S): 9 INJECTION INTRAMUSCULAR; INTRAVENOUS; SUBCUTANEOUS at 13:20

## 2017-08-26 RX ADMIN — Medication 10 MILLIGRAM(S): at 17:33

## 2017-08-26 RX ADMIN — Medication 500 MICROGRAM(S): at 03:22

## 2017-08-26 NOTE — PROGRESS NOTE ADULT - ASSESSMENT
**Neuro**   - Standing and PRN Ativan per psych to help w/ anxiety/sleep  **Pulm**  - Added standing nebz, on pulmozyme  - Aggressive chest PT, OOB to chair all day  - f/u Chest CT w/o contrast  **Cardio**   - ASA on board   - Cont tele monitoring  **GI**  - NPO w/ tube feeds @ goal 42ml/hr for 18 hours  - Tolerating well  - Protonix for prophylaxis  **Renal**   - stable bun/cr  **Vasc**  -  Sqh for dvt prophylaxis, asa for metal stents   **Heme**  - Stable  **ID**  - No Abx at this time, afebrile, non-toxic looking **Neuro**   - Standing and PRN Ativan per psych to help w/ anxiety/sleep  **Pulm**  - Added standing nebz, on pulmozyme  - Aggressive chest PT, OOB to chair all day  - f/u Chest CT w/o contrast  **Cardio**   - ASA on board   - Cont tele monitoring  **GI**  - NPO w/ tube feeds @ goal 42ml/hr for 18 hours  - Tolerating well  - Protonix for prophylaxis  **Renal**   - I&Os w/ low intake, increase fluid intake w/ fluid bolus and J-tube flushes   - stable bun/cr  **Vasc**  -  Sqh for dvt prophylaxis, asa for metal stents   **Heme**  - Stable  **ID**  - No Abx at this time, afebrile, non-toxic looking   - May to be drained depending on CT scan which might improve WBC

## 2017-08-26 NOTE — PROGRESS NOTE ADULT - SUBJECTIVE AND OBJECTIVE BOX
s/p robotic-assisted laparoscopic Vance Trent esophagogastrectomy,    MEDICATIONS  (STANDING):  heparin  Injectable 5000 Unit(s) SubCutaneous every 8 hours  aspirin Suppository 300 milliGRAM(s) Rectal daily  phenytoin  IVPB 200 milliGRAM(s) IV Intermittent two times a day  dexamethasone  Injectable 1 milliGRAM(s) IV Push daily  pantoprazole  Injectable 40 milliGRAM(s) IV Push daily  dornase ziyad Solution 2.5 milliGRAM(s) Inhalation daily  furosemide   Injectable 10 milliGRAM(s) IV Push two times a day  LORazepam   Injectable 0.5 milliGRAM(s) IV Push two times a day  ALBUTerol/ipratropium for Nebulization 3 milliLiter(s) Nebulizer every 6 hours    MEDICATIONS  (PRN):  ondansetron Injectable 4 milliGRAM(s) IV Push every 6 hours PRN Nausea  LORazepam   Injectable 0.5 milliGRAM(s) IV Push every 8 hours PRN Anxiety    Vital Signs Last 24 Hrs  T(C): 37.1 (26 Aug 2017 17:56), Max: 37.1 (26 Aug 2017 17:56)  T(F): 98.8 (26 Aug 2017 17:56), Max: 98.8 (26 Aug 2017 17:56)  HR: 95 (26 Aug 2017 17:56) (86 - 96)  BP: 136/63 (26 Aug 2017 17:56) (130/60 - 136/63)  BP(mean): --  RR: 17 (26 Aug 2017 17:56) (17 - 20)  SpO2: 100% (26 Aug 2017 17:56) (96% - 100%)    Constitutional: No fever, fatigue  Skin: No rash.  Eyes: No recent vision problems or eye pain.  ENT: No congestion, ear pain, or sore throat.  Cardiovascular: No chest pain or palpation.  Respiratory: No cough, shortness of breath, congestion, or wheezing.  Gastrointestinal: No abdominal pain, nausea, vomiting, or diarrhea.  Genitourinary: No dysuria.  Musculoskeletal: No joint swelling.  Neurologic: No headache.    HEENT :peerla, eomi  CVS : s1, s2+  RS :dec breath sounds at bases  ABD : soft, bs+  EXT :no edema  NEURO :alert awake oriented    LABS:      146<H>  |  107  |  17  ----------------------------<  130<H>  3.8   |  30  |  0.68    Ca    8.7      26 Aug 2017 06:24      Creatinine Trend: 0.68 <--, 0.70 <--, 0.81 <--, 0.81 <--, 0.90 <--, 0.88 <--, 0.95 <--, 0.88 <--                        8.3    13.12 )-----------( 236      ( 26 Aug 2017 06:24 )             26.4     Urine Studies:  Urinalysis Basic - ( 20 Aug 2017 14:40 )    Color: YELLOW / Appearance: HAZY / S.022 / pH: 7.5  Gluc: NEGATIVE / Ketone: NEGATIVE  / Bili: SMALL / Urobili: 8 E.U.   Blood: LARGE / Protein: 100 / Nitrite: NEGATIVE   Leuk Esterase: SMALL / RBC: >50 / WBC 10-25   Sq Epi:  / Non Sq Epi:  / Bacteria: FEW

## 2017-08-26 NOTE — CHART NOTE - NSCHARTNOTEFT_GEN_A_CORE
NUTRITION FOLLOW-UP: Pt tube feeding was changed from 24 hours to 18 hours to start at 6PM to12 noon. Feeding was also changed from Jevity to TwoCal. Pt is tolerating feeding well.     Weight: 142 lbs    Labs: Gllu-130, Na-146    Current Diet: NPO with tube feeding. Two Case via jejunostomy tube at 42 ml/hour for 18 hours.    Enteral Recommendations:    RD to Remain Available: Rebecca Fernandez MS, RDN, CDN pager [phone]     Additional Recommendations:   1) Monitor weight, labs, tube feeding tolerance and skin integrity.

## 2017-08-26 NOTE — PROGRESS NOTE ADULT - SUBJECTIVE AND OBJECTIVE BOX
83 yo male w/ PMHx esophagus CA s/p chemo and radiation therapy. Pt then had angiogram and bare metal stents x 3 placed June 30, 2017. On 8/14/17 pt underwent robo maryjane logan esophagectomy, and J-tube placement. Post op course was complicated 81 yo male w/ PMHx esophagus CA s/p chemo and radiation therapy. Pt then had angiogram and bare metal stents x 3 placed June 30, 2017. On 8/14/17 pt underwent robo maryjane trent esophagectomy, and J-tube placement. Post op course was complicated by a small leak around anastomosis. Pt was made NPO and tube feeds have been given @ goal and well tolerated.    Currently pt is sitting in chair, admits to continuous productive cough. He denies any hemoptysis, emesis, or nausea.       Vital Signs:  Vital Signs Last 24 Hrs  T(C): 36.7 (08-26-17 @ 09:17), Max: 37.1 (08-25-17 @ 17:09)  T(F): 98.1 (08-26-17 @ 09:17), Max: 98.7 (08-25-17 @ 17:09)  HR: 86 (08-26-17 @ 09:17) (76 - 96)  BP: 130/60 (08-26-17 @ 09:17) (91/38 - 134/61)  RR: 20 (08-26-17 @ 09:17) (16 - 20)  SpO2: 100% (08-26-17 @ 09:17) (95% - 100%) on (O2)    Telemetry/Alarms: NSR on monitor  Gen: Aox3, no acute distress   Pulm: Expiratory wheeze, crackles at base  Card: s1/s2 rrr no murmur   Abd: Soft non-tender, +bs, J-tube in place, well healed   GINA-drain: 127ml cloudy fluid    Pending CT scan today    Relevant labs, radiology and Medications reviewed                        8.3    13.12 )-----------( 236      ( 26 Aug 2017 06:24 )             26.4     08-26    146<H>  |  107  |  17  ----------------------------<  130<H>  3.8   |  30  |  0.68    Ca    8.7      26 Aug 2017 06:24        Pertinent Physical Exam  I&O's Summary    25 Aug 2017 07:01  -  26 Aug 2017 07:00  --------------------------------------------------------  IN: 1164 mL / OUT: 1407 mL / NET: -243 mL        Assessment  82y Male  w/ PAST MEDICAL & SURGICAL HISTORY:  Coronary artery disease: s/p 3 stents on June 30, 2017 at [place] Dr. Lavelle Reid  OA (osteoarthritis) of knee: right  Former smoker, stopped smoking in distant past  Former smoker  Rheumatoid arthritis  Seizure disorder: 1 episode approximately 15 yrs ago  Asthma  Hyperlipidemia  BPH (benign prostatic hyperplasia)  HTN (hypertension)  Esophagus cancer  Chronic allergic rhinitis  BCC (basal cell carcinoma): skin  Cerebrovascular accident (CVA)  Anxiety  History of tonsillectomy  H/O heart artery stent: June 30, 2017  Knee arthropathy: left  History of total hip replacement: left  History of hernia repair   Patient is a 82y old  Male who presents with a chief complaint of "they are going do surgery on esophagus" (11 Aug 2017 09:35)  .  On (Date), patient underwent Almo Trent esophagectomy  .     Postoperative course/issues:                                                                                                        PLAN    **Neuro**   - Standing and PRN Ativan per psych to help w/ anxiety/sleep  **Pulm**  - Added standing nebz, on pulmozyme  - Aggressive chest PT, OOB to chair all day  - f/u Chest CT w/o contrast  **Cardio**   - ASA on board   - Cont tele monitoring  **GI**  - NPO w/ tube feeds @ goal 42ml/hr for 18 hours  - Tolerating well  - Protonix for prophylaxis  **Renal**   - stable bun/cr  **Vasc**  -  Sqh for dvt prophylaxis, asa for metal stents   **Heme**  - Stable  **ID**  - No Abx at this time, afebrile, non-toxic looking       Tubes: CT to    Discussed with Cardiothoracic Team at AM rounds.                                                                                                      Contact pager [phone] 81 yo male w/ PMHx esophagus CA s/p chemo and radiation therapy. Pt then had angiogram and bare metal stents x 3 placed June 30, 2017. On 8/14/17 pt underwent robo maryjane trent esophagectomy, and J-tube placement. Post op course was complicated by a small leak around anastomosis. Pt was made NPO and tube feeds have been given @ goal and well tolerated.    Currently pt is sitting in chair, admits to continuous productive cough. He denies any hemoptysis, emesis, or nausea.       Vital Signs:  Vital Signs Last 24 Hrs  T(C): 36.7 (08-26-17 @ 09:17), Max: 37.1 (08-25-17 @ 17:09)  T(F): 98.1 (08-26-17 @ 09:17), Max: 98.7 (08-25-17 @ 17:09)  HR: 86 (08-26-17 @ 09:17) (76 - 96)  BP: 130/60 (08-26-17 @ 09:17) (91/38 - 134/61)  RR: 20 (08-26-17 @ 09:17) (16 - 20)  SpO2: 100% (08-26-17 @ 09:17) (95% - 100%) on (O2)    Telemetry/Alarms: NSR on monitor  Gen: Aox3, no acute distress   Pulm: Expiratory wheeze, crackles at base  Card: s1/s2 rrr no murmur   Abd: Soft non-tender, +bs, J-tube in place, well healed   GINA-drain: 127ml cloudy fluid    Pending CT scan today    Relevant labs, radiology and Medications reviewed                        8.3    13.12 )-----------( 236      ( 26 Aug 2017 06:24 )             26.4     08-26    146<H>  |  107  |  17  ----------------------------<  130<H>  3.8   |  30  |  0.68    Ca    8.7      26 Aug 2017 06:24        Pertinent Physical Exam  I&O's Summary    25 Aug 2017 07:01  -  26 Aug 2017 07:00  --------------------------------------------------------  IN: 1164 mL / OUT: 1407 mL / NET: -243 mL        Assessment  82y Male  w/ PAST MEDICAL & SURGICAL HISTORY:  Coronary artery disease: s/p 3 stents on June 30, 2017 at [place] Dr. Lavelle Reid  OA (osteoarthritis) of knee: right  Former smoker, stopped smoking in distant past  Former smoker  Rheumatoid arthritis  Seizure disorder: 1 episode approximately 15 yrs ago  Asthma  Hyperlipidemia  BPH (benign prostatic hyperplasia)  HTN (hypertension)  Esophagus cancer  Chronic allergic rhinitis  BCC (basal cell carcinoma): skin  Cerebrovascular accident (CVA)  Anxiety  History of tonsillectomy  H/O heart artery stent: June 30, 2017  Knee arthropathy: left  History of total hip replacement: left  History of hernia repair   Patient is a 82y old  Male who presents with a chief complaint of "they are going do surgery on esophagus" (11 Aug 2017 09:35)  .  On (Date), patient underwent Pedro Trent esophagectomy  .     Postoperative course/issues:                                                                                                        PLAN    **Neuro**   - Standing and PRN Ativan per psych to help w/ anxiety/sleep  **Pulm**  - Added standing nebz, on pulmozyme  - Aggressive chest PT, OOB to chair all day  - f/u Chest CT w/o contrast  **Cardio**   - ASA on board   - Cont tele monitoring  **GI**  - NPO w/ tube feeds @ goal 42ml/hr for 18 hours  - Tolerating well  - Protonix for prophylaxis  **Renal**   - I&Os w/ low intake, increase fluid intake w/ fluid bolus and J-tube flushes   - stable bun/cr  **Vasc**  -  Sqh for dvt prophylaxis, asa for metal stents   **Heme**  - Stable  **ID**  - No Abx at this time, afebrile, non-toxic looking   - May to be drained depending on CT scan which might improve WBC      Discussed with Cardiothoracic Team at AM rounds.                                                                                                      Contact pager [phone]

## 2017-08-26 NOTE — PROGRESS NOTE ADULT - SUBJECTIVE AND OBJECTIVE BOX
D Team Surgery Daily Progress Note    24 Hour:   - Improvement in respiratory status; less coughing  - Vitally stable  - Good UOP  - Decreasing drain output     Physical Exam:  - General: alert, interactive, no acute distress, sitting in chair  - Chest: GINA dark brown, bilious output  - Pulm: breathing comfortably   - Abd: soft, nontender, nondistended    Assessment: Robert De Luna is a 82 y.o. man with history of esophageal cancer (T2NO, s/p chemo and radiation) who is POD 12 from Lexa Trent Esophagectomy. Continued anastomotic leak that appears to be in connection with drain in right pleural space. Consistent with appearance of drain output. No collection, so drain is adequately controlling leak. The patient will likely require an extended time of NPO in order for leak to close.     Plan:  - NPO with tube feeds at goal  - Monitor drain output  - Will likely discharge NPO with drain in place   - Rehab upon discharge     ___________________________________________________    CT Chest (8/26): No lymphadenopathy. Status post esophagectomy with   gastric pull-through. Retained contrast is still present within the   gastric pull-through and partially imaged loops of bowel within the upper   abdomen from prior study. High density contrast surrounds the catheter in   the right pleural space consistent with persistent leak. No associated   collection.     ICU Vital Signs Last 24 Hrs  T(C): 36.7 (26 Aug 2017 09:17), Max: 36.8 (26 Aug 2017 01:33)  T(F): 98.1 (26 Aug 2017 09:17), Max: 98.2 (26 Aug 2017 01:33)  HR: 87 (26 Aug 2017 15:05) (76 - 96)  BP: 130/60 (26 Aug 2017 09:17) (130/60 - 134/61)  BP(mean): --  ABP: --  ABP(mean): --  RR: 20 (26 Aug 2017 09:17) (17 - 20)  SpO2: 100% (26 Aug 2017 15:05) (96% - 100%)    I&O's Detail    25 Aug 2017 07:01  -  26 Aug 2017 07:00  --------------------------------------------------------  IN:    Enteral Tube Flush: 500 mL    IV PiggyBack: 300 mL    TwoCal HN: 364 mL  Total IN: 1164 mL    OUT:    Bulb: 127 mL    Voided: 1280 mL  Total OUT: 1407 mL    Total NET: -243 mL      26 Aug 2017 07:01  -  26 Aug 2017 17:48  --------------------------------------------------------  IN:    Enteral Tube Flush: 500 mL    Sodium Chloride 0.9% IV Bolus: 500 mL    TwoCal HN: 210 mL  Total IN: 1210 mL    OUT:    Bulb: 40 mL    Voided: 475 mL  Total OUT: 515 mL    Total NET: 695 mL                          8.3    13.12 )-----------( 236      ( 26 Aug 2017 06:24 )             26.4     08-26    146<H>  |  107  |  17  ----------------------------<  130<H>  3.8   |  30  |  0.68    Ca    8.7      26 Aug 2017 06:24

## 2017-08-27 ENCOUNTER — TRANSCRIPTION ENCOUNTER (OUTPATIENT)
Age: 82
End: 2017-08-27

## 2017-08-27 LAB
BLD GP AB SCN SERPL QL: NEGATIVE — SIGNIFICANT CHANGE UP
BUN SERPL-MCNC: 17 MG/DL — SIGNIFICANT CHANGE UP (ref 7–23)
CALCIUM SERPL-MCNC: 8.5 MG/DL — SIGNIFICANT CHANGE UP (ref 8.4–10.5)
CHLORIDE SERPL-SCNC: 107 MMOL/L — SIGNIFICANT CHANGE UP (ref 98–107)
CO2 SERPL-SCNC: 30 MMOL/L — SIGNIFICANT CHANGE UP (ref 22–31)
CREAT SERPL-MCNC: 0.64 MG/DL — SIGNIFICANT CHANGE UP (ref 0.5–1.3)
GLUCOSE SERPL-MCNC: 146 MG/DL — HIGH (ref 70–99)
HCT VFR BLD CALC: 23.7 % — LOW (ref 39–50)
HGB BLD-MCNC: 7.3 G/DL — LOW (ref 13–17)
MCHC RBC-ENTMCNC: 30.3 PG — SIGNIFICANT CHANGE UP (ref 27–34)
MCHC RBC-ENTMCNC: 30.8 % — LOW (ref 32–36)
MCV RBC AUTO: 98.3 FL — SIGNIFICANT CHANGE UP (ref 80–100)
NRBC # FLD: 0 — SIGNIFICANT CHANGE UP
PLATELET # BLD AUTO: 250 K/UL — SIGNIFICANT CHANGE UP (ref 150–400)
PMV BLD: 9.7 FL — SIGNIFICANT CHANGE UP (ref 7–13)
POTASSIUM SERPL-MCNC: 3.5 MMOL/L — SIGNIFICANT CHANGE UP (ref 3.5–5.3)
POTASSIUM SERPL-SCNC: 3.5 MMOL/L — SIGNIFICANT CHANGE UP (ref 3.5–5.3)
RBC # BLD: 2.41 M/UL — LOW (ref 4.2–5.8)
RBC # FLD: 13.6 % — SIGNIFICANT CHANGE UP (ref 10.3–14.5)
RH IG SCN BLD-IMP: POSITIVE — SIGNIFICANT CHANGE UP
SODIUM SERPL-SCNC: 146 MMOL/L — HIGH (ref 135–145)
WBC # BLD: 10.08 K/UL — SIGNIFICANT CHANGE UP (ref 3.8–10.5)
WBC # FLD AUTO: 10.08 K/UL — SIGNIFICANT CHANGE UP (ref 3.8–10.5)

## 2017-08-27 PROCEDURE — 71010: CPT | Mod: 26

## 2017-08-27 RX ORDER — DOCUSATE SODIUM 100 MG
100 CAPSULE ORAL
Qty: 0 | Refills: 0 | Status: DISCONTINUED | OUTPATIENT
Start: 2017-08-27 | End: 2017-08-28

## 2017-08-27 RX ORDER — ALPRAZOLAM 0.25 MG
0.25 TABLET ORAL
Qty: 0 | Refills: 0 | Status: DISCONTINUED | OUTPATIENT
Start: 2017-08-27 | End: 2017-08-28

## 2017-08-27 RX ORDER — ASPIRIN AND DIPYRIDAMOLE 25; 200 MG/1; MG/1
1 CAPSULE, EXTENDED RELEASE ORAL
Qty: 0 | Refills: 0 | Status: DISCONTINUED | OUTPATIENT
Start: 2017-08-27 | End: 2017-08-28

## 2017-08-27 RX ORDER — ALPRAZOLAM 0.25 MG
0.25 TABLET ORAL EVERY 8 HOURS
Qty: 0 | Refills: 0 | Status: DISCONTINUED | OUTPATIENT
Start: 2017-08-27 | End: 2017-08-28

## 2017-08-27 RX ORDER — TAMSULOSIN HYDROCHLORIDE 0.4 MG/1
0.4 CAPSULE ORAL AT BEDTIME
Qty: 0 | Refills: 0 | Status: DISCONTINUED | OUTPATIENT
Start: 2017-08-27 | End: 2017-08-28

## 2017-08-27 RX ORDER — DOCUSATE SODIUM 100 MG
100 CAPSULE ORAL
Qty: 0 | Refills: 0 | Status: DISCONTINUED | OUTPATIENT
Start: 2017-08-27 | End: 2017-08-27

## 2017-08-27 RX ORDER — FINASTERIDE 5 MG/1
5 TABLET, FILM COATED ORAL DAILY
Qty: 0 | Refills: 0 | Status: DISCONTINUED | OUTPATIENT
Start: 2017-08-27 | End: 2017-08-28

## 2017-08-27 RX ORDER — PANTOPRAZOLE SODIUM 20 MG/1
40 TABLET, DELAYED RELEASE ORAL DAILY
Qty: 0 | Refills: 0 | Status: DISCONTINUED | OUTPATIENT
Start: 2017-08-27 | End: 2017-08-28

## 2017-08-27 RX ORDER — HEPARIN SODIUM 5000 [USP'U]/ML
5000 INJECTION INTRAVENOUS; SUBCUTANEOUS EVERY 12 HOURS
Qty: 0 | Refills: 0 | Status: DISCONTINUED | OUTPATIENT
Start: 2017-08-27 | End: 2017-08-28

## 2017-08-27 RX ORDER — SIMVASTATIN 20 MG/1
40 TABLET, FILM COATED ORAL AT BEDTIME
Qty: 0 | Refills: 0 | Status: DISCONTINUED | OUTPATIENT
Start: 2017-08-27 | End: 2017-08-28

## 2017-08-27 RX ADMIN — HEPARIN SODIUM 5000 UNIT(S): 5000 INJECTION INTRAVENOUS; SUBCUTANEOUS at 06:11

## 2017-08-27 RX ADMIN — TAMSULOSIN HYDROCHLORIDE 0.4 MILLIGRAM(S): 0.4 CAPSULE ORAL at 21:43

## 2017-08-27 RX ADMIN — Medication 0.5 MILLIGRAM(S): at 06:12

## 2017-08-27 RX ADMIN — Medication 1 MILLIGRAM(S): at 06:11

## 2017-08-27 RX ADMIN — Medication 100 MILLIGRAM(S): at 18:36

## 2017-08-27 RX ADMIN — Medication 200 MILLIGRAM(S): at 18:36

## 2017-08-27 RX ADMIN — Medication 3 MILLILITER(S): at 15:55

## 2017-08-27 RX ADMIN — Medication 3 MILLILITER(S): at 22:16

## 2017-08-27 RX ADMIN — Medication 3 MILLILITER(S): at 10:47

## 2017-08-27 RX ADMIN — DORNASE ALFA 2.5 MILLIGRAM(S): 1 SOLUTION RESPIRATORY (INHALATION) at 10:44

## 2017-08-27 RX ADMIN — Medication 0.25 MILLIGRAM(S): at 18:36

## 2017-08-27 RX ADMIN — SIMVASTATIN 40 MILLIGRAM(S): 20 TABLET, FILM COATED ORAL at 21:43

## 2017-08-27 RX ADMIN — Medication 5 MILLIGRAM(S): at 18:36

## 2017-08-27 RX ADMIN — Medication 3 MILLILITER(S): at 03:55

## 2017-08-27 RX ADMIN — ASPIRIN AND DIPYRIDAMOLE 1 CAPSULE(S): 25; 200 CAPSULE, EXTENDED RELEASE ORAL at 18:36

## 2017-08-27 RX ADMIN — Medication 10 MILLIGRAM(S): at 06:11

## 2017-08-27 RX ADMIN — HEPARIN SODIUM 5000 UNIT(S): 5000 INJECTION INTRAVENOUS; SUBCUTANEOUS at 18:35

## 2017-08-27 RX ADMIN — FINASTERIDE 5 MILLIGRAM(S): 5 TABLET, FILM COATED ORAL at 13:23

## 2017-08-27 NOTE — DISCHARGE NOTE ADULT - PATIENT PORTAL LINK FT
“You can access the [employer] Patient Portal, offered by [facility], by registering with the following website: [email or web address]”

## 2017-08-27 NOTE — DISCHARGE NOTE ADULT - PLAN OF CARE
Eventual ability to have oral feeds.  Prevention of recurrence or spread of the cancer Follow up with Dr Weinstein in 2 weeks.  Bring a new Chest X-ray with you.  Call the office sooner if you notice any change in symptoms. The nurse can empty your drain and administer tube feeds as ordered. See your PCP as well Follow up with Dr Weinstein in 2 weeks.  Bring a new Chest X-ray with you.  Call the office sooner if you notice any change in symptoms. The nurse can empty your drain and administer tube feeds as ordered. See your PCP as well.  Please follow up with Dr. Cohen in 1-2 weeks. Call to make an appointment  [phone] Follow up with Dr Weinstein in 2 weeks.  Bring a new Chest X-ray with you.  Call the office sooner if you notice any change in symptoms. The nurse can empty your drain and administer tube feeds as ordered. See your PCP as well.  Please follow up with Dr. Cohen in 1-2 weeks. Call to make an appointment  [phone]  Empty and record GINA drainage every shift

## 2017-08-27 NOTE — PROGRESS NOTE ADULT - ATTENDING COMMENTS
Clinically unchanged.  Afebrile, VSS.  Esophagram demonstrates contained leak.    Abdomen: soft. Jtube in place.    Plan: NPO.  Continue jtube feeds.
seen on 8/26
Clinically unchanged,  Afebrile, VSS.  Abdomen: soft.  Jtube in place.    Plan:  Continue tube feeds as tolerated. NPO/antibiotics.
Comfortable.  Afebrile, VSS.  Abdomen: soft. Jtube in place.    Assessment: query subclinical EG anastomotic leak.  Await esophagram for evaluation.  Continue jtube feeds and empiric antibiotics.
Esophagram tomorrow  Monitor WBC - UTI? PNA?
seen on 8/27

## 2017-08-27 NOTE — DISCHARGE NOTE ADULT - MEDICATION SUMMARY - MEDICATIONS TO TAKE
I will START or STAY ON the medications listed below when I get home from the hospital:    finasteride 5 mg oral tablet  -- 1 tab(s) by mouth once a day in pm  -- Indication: For Home med    predniSONE 5 mg oral tablet  -- 1 tab(s) by mouth once a day in am  -- Indication: For Home med    aspirin 81 mg oral tablet  -- 1 tab(s) by mouth once a day  last  8/11 dose   -- Indication: For Home med    acetaminophen 160 mg/5 mL oral suspension  -- 20.31 milliliter(s) by mouth every 6 hours, As needed, Mild Pain (1 - 3)  -- Indication: For Pain    olmesartan 20 mg oral tablet  -- 1 tab(s) by mouth once a day in pm  -- Indication: For Home med    tamsulosin 0.4 mg oral capsule  -- 1 cap(s) by mouth once a day in pm  -- Indication: For Home med    Dilantin 100 mg oral capsule  -- 2 cap(s) by mouth 2 times a day  -- Indication: For Home med    simvastatin 40 mg oral tablet  -- 1 tab(s) by mouth once a day in pm  -- Indication: For Home med    Zetia 10 mg oral tablet  -- 1 tab(s) by mouth once a day in pm  -- Indication: For Home med    aspirin-dipyridamole 25 mg-200 mg oral capsule, extended release  -- 1 cap(s) by mouth 2 times a day  -- Indication: For Home med    Plavix 75 mg oral tablet  -- 1 tab(s) by mouth once a day  last dose 8/10  -- Indication: For Home med    ALPRAZolam 0.25 mg oral tablet  -- 2 tab(s) by mouth 2 times a day  -- Indication: For Home med    ProAir HFA 90 mcg/inh inhalation aerosol  -- 2 puff(s) inhaled 4 times a day, As Needed   -- Indication: For Home med    amLODIPine 5 mg oral tablet  -- 1 tab(s) by mouth once a day in pm  -- Indication: For Home med    docusate sodium 100 mg oral capsule  -- 1 cap(s) by mouth 2 times a day  -- Indication: For Constipation    montelukast 10 mg oral tablet  -- 1 tab(s) by mouth once a day in pm  -- Indication: For Home med    potassium phosphate-sodium phosphate 305 mg-700 mg oral tablet  -- 1 tab(s) by mouth 4 times a day between 1800 to 12noon. 8 doses over 48hrs.  First dose given this at   9am   -- Indication: For low phosphate and potassium    simethicone 80 mg oral tablet, chewable  -- 1 tab(s) by mouth every 4 to 6 hours, As Needed -Dyspepsia  -- Indication: For Dyspepsia    pantoprazole 40 mg oral delayed release tablet  -- 1 tab(s) by mouth once a day  -- Indication: For Stomach protection    Centrum Silver oral tablet  -- 1 tab(s) by mouth once a day  last dose 8/11  -- Indication: For Home med    Vitamin B-12 500 mcg oral tablet  -- 1 tab(s) by mouth once a day  -- Indication: For Home med    folic acid 0.4 mg oral tablet  -- 1 tab(s) by mouth once a day  -- Indication: For Home med    Vitamin B6 100 mg oral tablet  -- 1 tab(s) by mouth once a day  -- Indication: For Home med

## 2017-08-27 NOTE — PROGRESS NOTE ADULT - SUBJECTIVE AND OBJECTIVE BOX
Subjective: "I feel pretty good except for this constant cough and dry mouth" Pt. siting OOB in chair. States good night's sleep, minimal anxiety. No BM since Thursday. Some SOB with exertion, no chest pain.     Vital Signs:  Vital Signs Last 24 Hrs  T(C): 37.1 (08-27-17 @ 08:03), Max: 37.1 (08-26-17 @ 17:56)  T(F): 98.7 (08-27-17 @ 08:03), Max: 98.8 (08-26-17 @ 17:56)  HR: 91 (08-27-17 @ 10:47) (87 - 95)  BP: 134/50 (08-27-17 @ 08:03) (118/54 - 136/63)  RR: 20 (08-27-17 @ 08:03) (17 - 20)  SpO2: 98% (08-27-17 @ 10:47) (95% - 100%) on (O2)    Telemetry/Alarms:  General: WN/WD NAD  Neurology: Awake, nonfocal, MAI x 4  Eyes: Scleras clear, PERRLA/ EOMI, Gross vision intact  ENT:Gross hearing intact, grossly patent pharynx, no stridor  Neck: Neck supple, trachea midline, No JVD,   Respiratory: Course diffuse wheeze and rhonchi, prod cough for clear sputum. Improved at nebulizer. Currently on O2.   CV: RRR, S1S2, no murmurs, rubs or gallops  Abdominal: Soft, NT, ND +BS, NPO. Tolerating TF at 42cc/hrs x 18hrs. Getting free water via PEG. J tube site c/d/i. + flatus, no BM since Thursday.   Extremities: No edema, + peripheral pulses  Skin: No Rashes, Hematoma, Ecchymosis  Lymphatic: No Neck, axilla, groin LAD  Psych: Oriented x 3, normal affect  Incisions: Rt. VATS c/d/i. KOBI. Old Rt. CT with mild erythema  Tubes: Rt. chest Gregg to bulb suction, output 65cc/24hrs. Dark gray, thick fluid mixed w spit. Likely old barium.   Relevant labs, radiology and Medications reviewed  EXAM:  CT CHEST        PROCEDURE DATE:  Aug 26 2017         INTERPRETATION:  CLINICAL INFORMATION: Status post robotic Vance Trent   complicated by leak. Reevaluate.    COMPARISON: CT Chest on 8/21/2017.    PROCEDURE:   CT of the Chest was performed without intravenous contrast.  Sagittal and coronal reformats were performed.      FINDINGS:    CHEST:     LUNGS AND LARGE AIRWAYS: Patent central airways. Areas of compressive   atelectasis in the right lower lobe, unchanged.  PLEURA: Moderate bilateral pleural effusion, loculated on the right.    Right chest tube in place.  VESSELS: Atherosclerotic changes of the thoracic aorta and coronary   arteries.  HEART: Heart size is mildly enlarged. No pericardial effusion.   MEDIASTINUM AND LAURA: No lymphadenopathy. Status post esophagectomy with   gastric pull-through. Retained contrast is still present within the   gastric pull-through and partially imaged loops of bowel within the upper   abdomen from prior study. High density contrast surrounds the catheter in   the right pleural space consistent with persistent leak. No associated   collection.   CHEST WALL AND LOWER NECK: Interval decrease in amount of subcutaneous   emphysema in the anterior chest wall.  VISUALIZED UPPER ABDOMEN: Bilateral renal cysts, partially imaged. Mild   thickening of the left adrenal gland, unchanged.   BONES: Stable multilevel degenerative spinal changes and L1 compression   deformity.    IMPRESSION:     Status post esophagectomy with gastric pull-through. Persistent leak. No   associated collection.     Moderate bilateral pleural effusions, loculated on the right.                                    7.3    10.08 )-----------( 250      ( 27 Aug 2017 05:32 )             23.7     08-27    146<H>  |  107  |  17  ----------------------------<  146<H>  3.5   |  30  |  0.64    Ca    8.5      27 Aug 2017 05:32        MEDICATIONS  (STANDING):  dornase ziyad Solution 2.5 milliGRAM(s) Inhalation daily  ALBUTerol/ipratropium for Nebulization 3 milliLiter(s) Nebulizer every 6 hours  dipyridamole 200 mG/aspirin 25 mG 1 Capsule(s) Oral two times a day  heparin  Injectable 5000 Unit(s) SubCutaneous every 12 hours  finasteride 5 milliGRAM(s) Oral daily  predniSONE   Tablet 5 milliGRAM(s) Oral daily  tamsulosin 0.4 milliGRAM(s) Oral at bedtime  phenytoin   Capsule 200 milliGRAM(s) Oral two times a day  simvastatin 40 milliGRAM(s) Oral at bedtime  ALPRAZolam 0.25 milliGRAM(s) Oral two times a day  docusate sodium Liquid 100 milliGRAM(s) Oral two times a day    MEDICATIONS  (PRN):  ondansetron Injectable 4 milliGRAM(s) IV Push every 6 hours PRN Nausea  ALPRAZolam 0.25 milliGRAM(s) Oral every 8 hours PRN anxiety or insomnia    Pertinent Physical Exam  I&O's Summary    26 Aug 2017 07:01  -  27 Aug 2017 07:00  --------------------------------------------------------  IN: 1210 mL / OUT: 943 mL / NET: 267 mL    27 Aug 2017 07:01  -  27 Aug 2017 11:53  --------------------------------------------------------  IN: 0 mL / OUT: 220 mL / NET: -220 mL        Assessment  82y Male  w/ PAST MEDICAL & SURGICAL HISTORY:  Coronary artery disease: s/p 3 stents on June 30, 2017 at [place] Dr. Lavelle Reid  OA (osteoarthritis) of knee: right  Former smoker, stopped smoking in distant past  Former smoker  Rheumatoid arthritis  Seizure disorder: 1 episode approximately 15 yrs ago  Asthma  Hyperlipidemia  BPH (benign prostatic hyperplasia)  HTN (hypertension)  Esophagus cancer  Chronic allergic rhinitis  BCC (basal cell carcinoma): skin  Cerebrovascular accident (CVA)  Anxiety  History of tonsillectomy  H/O heart artery stent: June 30, 2017  Knee arthropathy: left  History of total hip replacement: left  History of hernia repair  admitted with complaints of Patient is a 82y old  Male who presents with a chief complaint of "they are going do surgery on esophagus" (11 Aug 2017 09:35)  .On 8/14/17 patient underwent V[facility] Trent esophagectomy.Pt kept intubated postop8/19-8/20- inc leukocytosis, pan culture sent w C diff for diarrhea, started on Antibx. López replaced for retention. 8/21 Ba Swa showed sml leak, confirmed by CT of chest, Kept NPO. Keep Rt. Chest gregg to bulb on SS. 8/22-8/23- increased diarrhea, WBC improving, no fevers. 8/23- To 8T, TF changed to Twocal, increased to goal rate of 32cc/hrs. López dc'd->voiding. All cultures neg including Cdiff. Ok for Immodium per Dr. Weinstein Added free water. ALL MEDS IV, NOTHING VIA JTUBE OR ORAL. Diarrhea improving. Seen by Psych for anxiety and insomnia, improved on Ativan. TF changed to 18hrs/day. 8/26- Repeat CT of chest done.       PLAN  Neuro: Pain management, pain meds as needed. Currently pain free. Cont Dilantin, will switch to preop po dose. As per pt. was taking Aggrenox prior to admission, no ASA, for h.o TIA. Prescibed by his neurologist. Will resume Aggrenox per Dr. Weinstein.  Pulm: Encourage coughing, deep breathing and use of incentive spirometry. Wean off supplemental oxygen as able. Cont nebs, chest PT. No plans for pleural drainage per Dr. Weinstein.Daily CXR. CT scan reviewed by Dr. Weinstein. Feels gregg is sitting perfectly in chest and chest is well drained. Will cont long term for known esoph leak.   Cardio: Monitor telemetry/alarms  GI: cont NPO. Cont TF as ordered. Will increase free water to 300cc Q6hrs and stop diuretics. Will add stool softeners.   Renal: monitor urine output, supplement electrolytes as needed. Increase free water for hypernatremia.   Vasc: Heparin SC/SCDs for DVT prophylaxis. Resume Aggrenox  Heme: Stable H/H. Acute blood loss worsening today, will transfuse 1 unit PRBCs.   ID: Off antibiotics. Stable.  Therapy: OOB/ambulate,   Tubes: Monitor Chest tube output, cont to empty Gregg Q4hrs.   Disposition: Aim to D/C to rehab in next 1-2 days.  Per Dr. Weinstein ok to stop all meds and IV and convert to po. Nothing through J tube. Ok for pt. to take sml sips of water for his meds only.   Discussed with Cardiothoracic Team at AM rounds.

## 2017-08-27 NOTE — PROGRESS NOTE ADULT - SUBJECTIVE AND OBJECTIVE BOX
MEJIA MCCORDBA:[id number],   82yMale followed for:  penicillin (Rash)    PAST MEDICAL & SURGICAL HISTORY:  Coronary artery disease: s/p 3 stents on June 30, 2017 at [place] Dr. Lavelle Reid  OA (osteoarthritis) of knee: right  Former smoker, stopped smoking in distant past  Former smoker  Rheumatoid arthritis  Seizure disorder: 1 episode approximately 15 yrs ago  Asthma  Hyperlipidemia  BPH (benign prostatic hyperplasia)  HTN (hypertension)  Esophagus cancer  Chronic allergic rhinitis  BCC (basal cell carcinoma): skin  Cerebrovascular accident (CVA)  Anxiety  History of tonsillectomy  H/O heart artery stent: June 30, 2017  Knee arthropathy: left  History of total hip replacement: left  History of hernia repair    FAMILY HISTORY:  Family history of heart attack (Sibling)  Family history of pulmonary embolism (Father): father @ 49 yr old  FH: CAD (coronary artery disease) (Mother)    MEDICATIONS  (STANDING):  heparin  Injectable 5000 Unit(s) SubCutaneous every 8 hours  aspirin Suppository 300 milliGRAM(s) Rectal daily  phenytoin  IVPB 200 milliGRAM(s) IV Intermittent two times a day  dexamethasone  Injectable 1 milliGRAM(s) IV Push daily  pantoprazole  Injectable 40 milliGRAM(s) IV Push daily  dornase ziyad Solution 2.5 milliGRAM(s) Inhalation daily  furosemide   Injectable 10 milliGRAM(s) IV Push two times a day  LORazepam   Injectable 0.5 milliGRAM(s) IV Push two times a day  ALBUTerol/ipratropium for Nebulization 3 milliLiter(s) Nebulizer every 6 hours    MEDICATIONS  (PRN):  ondansetron Injectable 4 milliGRAM(s) IV Push every 6 hours PRN Nausea  LORazepam   Injectable 0.5 milliGRAM(s) IV Push every 8 hours PRN Anxiety      Vital Signs Last 24 Hrs  T(C): 37.1 (27 Aug 2017 08:03), Max: 37.1 (26 Aug 2017 17:56)  T(F): 98.7 (27 Aug 2017 08:03), Max: 98.8 (26 Aug 2017 17:56)  HR: 92 (27 Aug 2017 08:03) (87 - 95)  BP: 134/50 (27 Aug 2017 08:03) (118/54 - 136/63)  BP(mean): --  RR: 20 (27 Aug 2017 08:03) (17 - 20)  SpO2: 100% (27 Aug 2017 08:03) (95% - 100%)  nc/at  s1s2  cta  soft, nt, nd no guarding or rebound  no c/c/e    CBC Full  -  ( 27 Aug 2017 05:32 )  WBC Count : 10.08 K/uL  Hemoglobin : 7.3 g/dL  Hematocrit : 23.7 %  Platelet Count - Automated : 250 K/uL  Mean Cell Volume : 98.3 fL  Mean Cell Hemoglobin : 30.3 pg  Mean Cell Hemoglobin Concentration : 30.8 %  Auto Neutrophil # : x  Auto Lymphocyte # : x  Auto Monocyte # : x  Auto Eosinophil # : x  Auto Basophil # : x  Auto Neutrophil % : x  Auto Lymphocyte % : x  Auto Monocyte % : x  Auto Eosinophil % : x  Auto Basophil % : x    08-27    146<H>  |  107  |  17  ----------------------------<  146<H>  3.5   |  30  |  0.64    Ca    8.5      27 Aug 2017 05:32

## 2017-08-27 NOTE — DISCHARGE NOTE ADULT - CARE PROVIDER_API CALL
Christian Weinstein), Surgery; Thoracic Surgery  [address] [place] [address]  [place] [address]  Phone: [phone]  Fax: [phone]    Leif Santiago), Cardiovascular Disease; Internal Medicine  [address] [place] [id number]  Phone: [phone]  Fax: [phone] Christian Weinstein), Surgery; Thoracic Surgery  [address] [place] [address]  [place] [address]  Phone: [phone]  Fax: [phone]    Leif Santiago (MD), Cardiovascular Disease; Internal Medicine  [address] [place] [address]  Phone: [phone]  Fax: [phone]    Ganesh Cohen), Surgery; Surgical Oncology  [address] [place] [address]  Phone: [phone]  Fax: [phone]

## 2017-08-27 NOTE — DISCHARGE NOTE ADULT - CARE PLAN
Principal Discharge DX:	Esophagus cancer  Goal:	Eventual ability to have oral feeds.  Prevention of recurrence or spread of the cancer  Instructions for follow-up, activity and diet:	Follow up with Dr Weinstein in 2 weeks.  Bring a new Chest X-ray with you.  Call the office sooner if you notice any change in symptoms. The nurse can empty your drain and administer tube feeds as ordered. See your PCP as well Principal Discharge DX:	Esophagus cancer  Goal:	Eventual ability to have oral feeds.  Prevention of recurrence or spread of the cancer  Instructions for follow-up, activity and diet:	Follow up with Dr Weinstein in 2 weeks.  Bring a new Chest X-ray with you.  Call the office sooner if you notice any change in symptoms. The nurse can empty your drain and administer tube feeds as ordered. See your PCP as well.  Please follow up with Dr. Cohen in 1-2 weeks. Call to make an appointment  [phone] Principal Discharge DX:	Esophagus cancer  Goal:	Eventual ability to have oral feeds.  Prevention of recurrence or spread of the cancer  Instructions for follow-up, activity and diet:	Follow up with Dr Weinstein in 2 weeks.  Bring a new Chest X-ray with you.  Call the office sooner if you notice any change in symptoms. The nurse can empty your drain and administer tube feeds as ordered. See your PCP as well.  Please follow up with Dr. Cohen in 1-2 weeks. Call to make an appointment  [phone]  Empty and record GINA drainage every shift Principal Discharge DX:	Esophagus cancer  Goal:	Eventual ability to have oral feeds.  Prevention of recurrence or spread of the cancer  Instructions for follow-up, activity and diet:	Follow up with Dr Weinstein in 2 weeks.  Bring a new Chest X-ray with you.  Call the office sooner if you notice any change in symptoms. The nurse can empty your drain and administer tube feeds as ordered. See your PCP as well.  Please follow up with Dr. Cohen in 1-2 weeks. Call to make an appointment  [phone]  Empty and record GINA drainage every shift Principal Discharge DX:	Esophagus cancer  Goal:	Eventual ability to have oral feeds.  Prevention of recurrence or spread of the cancer  Instructions for follow-up, activity and diet:	Follow up with Dr Weinstein in 2 weeks.  Bring a new Chest X-ray with you.  Call the office sooner if you notice any change in symptoms. The nurse can empty your drain and administer tube feeds as ordered. See your PCP as well.  Please follow up with Dr. Cohen in 1-2 weeks. Call to make an appointment  [phone]  Empty and record GINA drainage every shift Principal Discharge DX:	Esophagus cancer  Goal:	Eventual ability to have oral feeds.  Prevention of recurrence or spread of the cancer  Instructions for follow-up, activity and diet:	Follow up with Dr Weinstein in 2 weeks.  Bring a new Chest X-ray with you.  Call the office sooner if you notice any change in symptoms. The nurse can empty your drain and administer tube feeds as ordered. See your PCP as well.  Please follow up with Dr. Cohen in 1-2 weeks. Call to make an appointment  [phone]  Empty and record GINA drainage every shift

## 2017-08-27 NOTE — PROGRESS NOTE ADULT - SUBJECTIVE AND OBJECTIVE BOX
s/p robotic-assisted laparoscopic Vance Trent esophagogastrectomy,    MEDICATIONS  (STANDING):  dornase ziyad Solution 2.5 milliGRAM(s) Inhalation daily  ALBUTerol/ipratropium for Nebulization 3 milliLiter(s) Nebulizer every 6 hours  dipyridamole 200 mG/aspirin 25 mG 1 Capsule(s) Oral two times a day  heparin  Injectable 5000 Unit(s) SubCutaneous every 12 hours  finasteride 5 milliGRAM(s) Oral daily  predniSONE   Tablet 5 milliGRAM(s) Oral daily  tamsulosin 0.4 milliGRAM(s) Oral at bedtime  phenytoin   Capsule 200 milliGRAM(s) Oral two times a day  simvastatin 40 milliGRAM(s) Oral at bedtime  ALPRAZolam 0.25 milliGRAM(s) Oral two times a day  docusate sodium 100 milliGRAM(s) Oral two times a day    MEDICATIONS  (PRN):  ondansetron Injectable 4 milliGRAM(s) IV Push every 6 hours PRN Nausea  ALPRAZolam 0.25 milliGRAM(s) Oral every 8 hours PRN anxiety or insomnia    Vital Signs Last 24 Hrs  T(C): 37.1 (27 Aug 2017 20:03), Max: 37.3 (27 Aug 2017 13:47)  T(F): 98.7 (27 Aug 2017 20:03), Max: 99.1 (27 Aug 2017 13:47)  HR: 86 (27 Aug 2017 20:03) (86 - 97)  BP: 107/48 (27 Aug 2017 20:03) (107/48 - 134/54)  BP(mean): --  RR: 18 (27 Aug 2017 20:03) (17 - 20)  SpO2: 97% (27 Aug 2017 20:03) (95% - 100%)    Constitutional: No fever, fatigue  Skin: No rash.  Eyes: No recent vision problems or eye pain.  ENT: No congestion, ear pain, or sore throat.  Cardiovascular: No chest pain or palpation.  Respiratory: No cough, shortness of breath, congestion, or wheezing.  Gastrointestinal: No abdominal pain, nausea, vomiting, or diarrhea.  Genitourinary: No dysuria.  Musculoskeletal: No joint swelling.  Neurologic: No headache.    HEENT :peerla, eomi  CVS : s1, s2+  RS :dec breath sounds at bases  ABD : soft, bs+  EXT :no edema  NEURO :alert awake oriented    LABS:  08-27    146<H>  |  107  |  17  ----------------------------<  146<H>  3.5   |  30  |  0.64    Ca    8.5      27 Aug 2017 05:32      Creatinine Trend: 0.64 <--, 0.68 <--, 0.70 <--, 0.81 <--, 0.81 <--, 0.90 <--, 0.88 <--                        7.3    10.08 )-----------( 250      ( 27 Aug 2017 05:32 )             23.7     Urine Studies:

## 2017-08-27 NOTE — DISCHARGE NOTE ADULT - PROVIDER TOKENS
TOKSARINA:'[id number]',TOKSARINA:'[id number]' TOKEN:'[id number]:'[id number]',TOKEN:'[id number]'

## 2017-08-27 NOTE — PROGRESS NOTE ADULT - SUBJECTIVE AND OBJECTIVE BOX
D Team Surgery Daily Progress Note    SUBJECTIVE:   patient was seen and examined this morning. There was no acute event overnight. He has good Urine ouput with decreasing Clay drainage. He does not report pain, fever, n/v, chest pain, or shortness of breath. -     Assessment: Robert De Luna is a 82 y.o. man with history of esophageal cancer (T2NO, s/p chemo and radiation) who is POD 12 from Vance Trent Esophagectomy. Continued anastomotic leak that appears to be in connection with drain in right pleural space. Consistent with appearance of drain output. No collection, so drain is adequately controlling leak. The patient will likely require an extended time of NPO in order for leak to close.     Plan:  - NPO with tube feeds at goal  - Monitor drain output  - Will likely discharge NPO with drain in place   - Rehab upon discharge       OBJECTIVE:   T(C): 37.3 (08-27-17 @ 13:47), Max: 37.3 (08-27-17 @ 13:47)  HR: 94 (08-27-17 @ 13:47) (87 - 95)  BP: 112/55 (08-27-17 @ 13:47) (112/55 - 136/63)  RR: 18 (08-27-17 @ 13:47) (17 - 20)  SpO2: 100% (08-27-17 @ 13:47) (95% - 100%)  Wt(kg): --  CAPILLARY BLOOD GLUCOSE        I&O's Detail    26 Aug 2017 07:01  -  27 Aug 2017 07:00  --------------------------------------------------------  IN:    Enteral Tube Flush: 500 mL    Sodium Chloride 0.9% IV Bolus: 500 mL    TwoCal HN: 210 mL  Total IN: 1210 mL    OUT:    Bulb: 68 mL    Voided: 875 mL  Total OUT: 943 mL    Total NET: 267 mL      27 Aug 2017 07:01  -  27 Aug 2017 14:16  --------------------------------------------------------  IN:    Enteral Tube Flush: 300 mL    TwoCal HN: 168 mL  Total IN: 468 mL    OUT:    Bulb: 25 mL    Voided: 420 mL  Total OUT: 445 mL    Total NET: 23 mL        PHYSICAL EXAM:  Gen: cathetic weloped, A&O x3, sleeping in bed in no acute distress  CV: RRR, normal S1, S2  Resp: unlabored, CLAY dark brown, bilious output  Abdomen: Soft, nontender, nondistended  Extremities: All 4 extremities warm and well perfused, no edema D Team Surgery Daily Progress Note    SUBJECTIVE:   patient was seen and examined this morning. There was no acute event overnight. He has good Urine ouput with decreasing GINA drainage. he has had some couphing but otherwise he does not report pain, fever, n/v, chest pain, or shortness of breath. -       OBJECTIVE:   T(C): 37.3 (08-27-17 @ 13:47), Max: 37.3 (08-27-17 @ 13:47)  HR: 94 (08-27-17 @ 13:47) (87 - 95)  BP: 112/55 (08-27-17 @ 13:47) (112/55 - 136/63)  RR: 18 (08-27-17 @ 13:47) (17 - 20)  SpO2: 100% (08-27-17 @ 13:47) (95% - 100%)  Wt(kg): --  CAPILLARY BLOOD GLUCOSE        I&O's Detail    26 Aug 2017 07:01  -  27 Aug 2017 07:00  --------------------------------------------------------  IN:    Enteral Tube Flush: 500 mL    Sodium Chloride 0.9% IV Bolus: 500 mL    TwoCal HN: 210 mL  Total IN: 1210 mL    OUT:    Bulb: 68 mL    Voided: 875 mL  Total OUT: 943 mL    Total NET: 267 mL      27 Aug 2017 07:01  -  27 Aug 2017 14:16  --------------------------------------------------------  IN:    Enteral Tube Flush: 300 mL    TwoCal HN: 168 mL  Total IN: 468 mL    OUT:    Bulb: 25 mL    Voided: 420 mL  Total OUT: 445 mL    Total NET: 23 mL        PHYSICAL EXAM:  Gen: cachectic patient, A&O x3, sleeping in bed in no acute distress  CV: RRR  Resp: unlabored, GINA dark brown, bilious output  Abdomen: Soft, nontender, nondistended  Extremities: All 4 extremities warm and well perfused, no edema

## 2017-08-27 NOTE — PROGRESS NOTE ADULT - ASSESSMENT
s/p robotic-assisted laparoscopic [place] Trent esophagogastrectomy,      Plan:  - NPO with tube feeds at goal  - Monitor drain output  - Will likely discharge NPO with drain in place   - Rehab upon discharge   - encourage to ambulate and monitor respiratory status  - Couphing: per CT recommencdation, will encourage coughing, deep breathing and use of incentive spirometry. Wean off supplemental oxygen as able. Cont nebs, chest PT. No plans for pleural drainageGI: cont NPO. Cont TF as ordered.   - Will increase free water to 300cc Q6hrs and stop diuretics. Will add stool softeners. can have PO med with sips of water

## 2017-08-27 NOTE — DISCHARGE NOTE ADULT - HOSPITAL COURSE
82 yr old male with hx of cancer of esophagus s/p chemo and radiation underwent an Esophagectomy and Feeding Jejunostomy placement on 07/14/17.  Post-op the pt had increasing leukocytosis and was started on antibiotics.  A López catheter was replaced for urinary retention. And then on 8/21, the barium swallow showed a small leak.  This was confirmed by a CT scan of the chest. The pt was kept NPO and his drain was continued.  He was given tube feeds through his J-tube and his López was removed.  His leukocytosis improved.  A repeat CT chest showed a persistent leak but no associated collection.  The pt was allowed to have PO meds with a sip of water.  Additionally, the pt had a transfusion of PRBCs on 8/27.  He was then considered stable for transfer to a rehab facility.

## 2017-08-27 NOTE — DISCHARGE NOTE ADULT - COMMUNITY RESOURCES
[address] [address]  [phone]  [facility] for transportation  [phone]

## 2017-08-27 NOTE — PROGRESS NOTE ADULT - ASSESSMENT
Assessment: Robert De Luna is a 82 y.o. man with history of esophageal cancer (T2NO, s/p chemo and radiation) who is POD 13 from [place] Trent Esophagectomy. Continued anastomotic leak that appears to be in connection with drain in right pleural space. Consistent with appearance of drain output. No collection, so drain is adequately controlling leak. The patient will likely require an extended time of NPO in order for leak to close.     Plan:  - NPO with tube feeds at goal  - Monitor drain output  - Will likely discharge NPO with drain in place   - Rehab upon discharge   - encourage to ambulate and monitor respiratory status Assessment: Robert De Luna is a 82 y.o. man with history of esophageal cancer (T2NO, s/p chemo and radiation) who is POD 13 from [place] Trent Esophagectomy. Continued anastomotic leak that appears to be in connection with drain in right pleural space. Consistent with appearance of drain output. No collection, so drain is adequately controlling leak. The patient will likely require an extended time of NPO in order for leak to close.     Plan:  - NPO with tube feeds at goal  - Monitor drain output  - Will likely discharge NPO with drain in place   - Rehab upon discharge   - encourage to ambulate and monitor respiratory status  - Couphing: per CT recommencdation, will encourage coughing, deep breathing and use of incentive spirometry. Wean off supplemental oxygen as able. Cont nebs, chest PT. No plans for pleural drainageGI: cont NPO. Cont TF as ordered.   - Will increase free water to 300cc Q6hrs and stop diuretics. Will add stool softeners. can have PO med with sips of water

## 2017-08-27 NOTE — DISCHARGE NOTE ADULT - CARE PROVIDERS DIRECT ADDRESSES
,[email or web address]

## 2017-08-27 NOTE — DISCHARGE NOTE ADULT - INSTRUCTIONS
Tube feeds: 2 Case HN @ 42 mL/h x 18 h per day.  Every 6 hours, 300 mL of water can be instilled through the tube.  Only medications with a sip of water are allowed orally. Tube feeds: 2 Case HN @ 42 mL/h x 18 h per day(from 1800 to 12noon).  Every 6 hours, 300 mL of water can be instilled through the tube.  Only medications with a sip of water are allowed orally. pt going to rehab

## 2017-08-27 NOTE — DISCHARGE NOTE ADULT - NS AS ACTIVITY OBS
Stairs allowed/Showering allowed/Walking-Outdoors allowed/Do not drive or operate machinery/No Heavy lifting/straining/Walking-Indoors allowed

## 2017-08-28 VITALS
RESPIRATION RATE: 18 BRPM | TEMPERATURE: 98 F | DIASTOLIC BLOOD PRESSURE: 52 MMHG | SYSTOLIC BLOOD PRESSURE: 126 MMHG | OXYGEN SATURATION: 95 % | HEART RATE: 82 BPM

## 2017-08-28 LAB
BUN SERPL-MCNC: 16 MG/DL — SIGNIFICANT CHANGE UP (ref 7–23)
CALCIUM SERPL-MCNC: 8.7 MG/DL — SIGNIFICANT CHANGE UP (ref 8.4–10.5)
CHLORIDE SERPL-SCNC: 106 MMOL/L — SIGNIFICANT CHANGE UP (ref 98–107)
CO2 SERPL-SCNC: 29 MMOL/L — SIGNIFICANT CHANGE UP (ref 22–31)
CREAT SERPL-MCNC: 0.68 MG/DL — SIGNIFICANT CHANGE UP (ref 0.5–1.3)
GLUCOSE SERPL-MCNC: 143 MG/DL — HIGH (ref 70–99)
HCT VFR BLD CALC: 27.4 % — LOW (ref 39–50)
HGB BLD-MCNC: 8.6 G/DL — LOW (ref 13–17)
MAGNESIUM SERPL-MCNC: 2 MG/DL — SIGNIFICANT CHANGE UP (ref 1.6–2.6)
MCHC RBC-ENTMCNC: 30.9 PG — SIGNIFICANT CHANGE UP (ref 27–34)
MCHC RBC-ENTMCNC: 31.4 % — LOW (ref 32–36)
MCV RBC AUTO: 98.6 FL — SIGNIFICANT CHANGE UP (ref 80–100)
NRBC # FLD: 0 — SIGNIFICANT CHANGE UP
PHOSPHATE SERPL-MCNC: 2.3 MG/DL — LOW (ref 2.5–4.5)
PLATELET # BLD AUTO: 253 K/UL — SIGNIFICANT CHANGE UP (ref 150–400)
PMV BLD: 10 FL — SIGNIFICANT CHANGE UP (ref 7–13)
POTASSIUM SERPL-MCNC: 3.7 MMOL/L — SIGNIFICANT CHANGE UP (ref 3.5–5.3)
POTASSIUM SERPL-SCNC: 3.7 MMOL/L — SIGNIFICANT CHANGE UP (ref 3.5–5.3)
RBC # BLD: 2.78 M/UL — LOW (ref 4.2–5.8)
RBC # FLD: 14.1 % — SIGNIFICANT CHANGE UP (ref 10.3–14.5)
SODIUM SERPL-SCNC: 145 MMOL/L — SIGNIFICANT CHANGE UP (ref 135–145)
WBC # BLD: 12.07 K/UL — HIGH (ref 3.8–10.5)
WBC # FLD AUTO: 12.07 K/UL — HIGH (ref 3.8–10.5)

## 2017-08-28 PROCEDURE — 99233 SBSQ HOSP IP/OBS HIGH 50: CPT

## 2017-08-28 RX ORDER — OXYCODONE HYDROCHLORIDE 5 MG/1
5 TABLET ORAL ONCE
Qty: 0 | Refills: 0 | Status: DISCONTINUED | OUTPATIENT
Start: 2017-08-28 | End: 2017-08-28

## 2017-08-28 RX ORDER — SODIUM,POTASSIUM PHOSPHATES 278-250MG
1 POWDER IN PACKET (EA) ORAL
Qty: 0 | Refills: 0 | COMMUNITY
Start: 2017-08-28

## 2017-08-28 RX ORDER — SIMETHICONE 80 MG/1
1 TABLET, CHEWABLE ORAL
Qty: 0 | Refills: 0 | COMMUNITY
Start: 2017-08-28

## 2017-08-28 RX ORDER — POTASSIUM PHOSPHATE, MONOBASIC POTASSIUM PHOSPHATE, DIBASIC 236; 224 MG/ML; MG/ML
15 INJECTION, SOLUTION INTRAVENOUS ONCE
Qty: 0 | Refills: 0 | Status: COMPLETED | OUTPATIENT
Start: 2017-08-28 | End: 2017-08-28

## 2017-08-28 RX ORDER — ASPIRIN AND DIPYRIDAMOLE 25; 200 MG/1; MG/1
1 CAPSULE, EXTENDED RELEASE ORAL
Qty: 0 | Refills: 0 | COMMUNITY
Start: 2017-08-28

## 2017-08-28 RX ORDER — SIMETHICONE 80 MG/1
80 TABLET, CHEWABLE ORAL ONCE
Qty: 0 | Refills: 0 | Status: COMPLETED | OUTPATIENT
Start: 2017-08-28 | End: 2017-08-28

## 2017-08-28 RX ORDER — SODIUM,POTASSIUM PHOSPHATES 278-250MG
1 POWDER IN PACKET (EA) ORAL
Qty: 0 | Refills: 0 | Status: DISCONTINUED | OUTPATIENT
Start: 2017-08-28 | End: 2017-08-28

## 2017-08-28 RX ORDER — DOCUSATE SODIUM 100 MG
1 CAPSULE ORAL
Qty: 0 | Refills: 0 | COMMUNITY
Start: 2017-08-28

## 2017-08-28 RX ORDER — ACETAMINOPHEN 500 MG
650 TABLET ORAL EVERY 6 HOURS
Qty: 0 | Refills: 0 | Status: DISCONTINUED | OUTPATIENT
Start: 2017-08-28 | End: 2017-08-28

## 2017-08-28 RX ORDER — ACETAMINOPHEN 500 MG
20.31 TABLET ORAL
Qty: 0 | Refills: 0 | COMMUNITY
Start: 2017-08-28

## 2017-08-28 RX ORDER — PANTOPRAZOLE SODIUM 20 MG/1
1 TABLET, DELAYED RELEASE ORAL
Qty: 0 | Refills: 0 | COMMUNITY
Start: 2017-08-28

## 2017-08-28 RX ADMIN — Medication 650 MILLIGRAM(S): at 09:47

## 2017-08-28 RX ADMIN — DORNASE ALFA 2.5 MILLIGRAM(S): 1 SOLUTION RESPIRATORY (INHALATION) at 11:27

## 2017-08-28 RX ADMIN — FINASTERIDE 5 MILLIGRAM(S): 5 TABLET, FILM COATED ORAL at 12:25

## 2017-08-28 RX ADMIN — Medication 5 MILLIGRAM(S): at 06:04

## 2017-08-28 RX ADMIN — PANTOPRAZOLE SODIUM 40 MILLIGRAM(S): 20 TABLET, DELAYED RELEASE ORAL at 12:25

## 2017-08-28 RX ADMIN — Medication 200 MILLIGRAM(S): at 06:04

## 2017-08-28 RX ADMIN — Medication 100 MILLIGRAM(S): at 06:04

## 2017-08-28 RX ADMIN — HEPARIN SODIUM 5000 UNIT(S): 5000 INJECTION INTRAVENOUS; SUBCUTANEOUS at 06:04

## 2017-08-28 RX ADMIN — Medication 3 MILLILITER(S): at 04:06

## 2017-08-28 RX ADMIN — Medication 1 TABLET(S): at 09:50

## 2017-08-28 RX ADMIN — OXYCODONE HYDROCHLORIDE 5 MILLIGRAM(S): 5 TABLET ORAL at 11:09

## 2017-08-28 RX ADMIN — Medication 0.25 MILLIGRAM(S): at 06:04

## 2017-08-28 RX ADMIN — Medication 1 TABLET(S): at 12:25

## 2017-08-28 RX ADMIN — ASPIRIN AND DIPYRIDAMOLE 1 CAPSULE(S): 25; 200 CAPSULE, EXTENDED RELEASE ORAL at 06:04

## 2017-08-28 RX ADMIN — SIMETHICONE 80 MILLIGRAM(S): 80 TABLET, CHEWABLE ORAL at 13:30

## 2017-08-28 RX ADMIN — Medication 650 MILLIGRAM(S): at 08:46

## 2017-08-28 RX ADMIN — Medication 3 MILLILITER(S): at 11:13

## 2017-08-28 NOTE — PROGRESS NOTE BEHAVIORAL HEALTH - CASE SUMMARY
Pt seen in follow-up; doing well and will be transferred to rehab later today. Dishcarge pt on Xanax PO 0.25mg po bid, pt will f/u with outpatient psychiatrist Dr. Gibbs.

## 2017-08-28 NOTE — PROGRESS NOTE BEHAVIORAL HEALTH - NSBHCHARTREVIEWLAB_PSY_A_CORE FT
8.0    11.16 )-----------( 235      ( 25 Aug 2017 05:54 )             25.8    08-25    144  |  108<H>  |  15  ----------------------------<  98  3.9   |  28  |  0.70    Ca    8.9      25 Aug 2017 05:54
8.6    12.07 )-----------( 253      ( 28 Aug 2017 05:55 )             27.4     08-28    145  |  106  |  16  ----------------------------<  143<H>  3.7   |  29  |  0.68    Ca    8.7      28 Aug 2017 05:55  Phos  2.3     08-28  Mg     2.0     08-28

## 2017-08-28 NOTE — PROGRESS NOTE ADULT - PROVIDER SPECIALTY LIST ADULT
Anesthesia
CT Surgery
CT Surgery
Critical Care
Gastroenterology
Internal Medicine
Pain Medicine
Surgery
Thoracic Surgery
Thoracic Surgery
Anesthesia
Critical Care
Critical Care
Gastroenterology

## 2017-08-28 NOTE — PROGRESS NOTE BEHAVIORAL HEALTH - NSBHFUPINTERVALHXFT_PSY_A_CORE
Pt seen at bedside with wife. Reports continued good control of anxiety over the weekend, sleeping very well. IV Ativan changed to PO Xanax. No further complaints.

## 2017-08-28 NOTE — PROGRESS NOTE ADULT - SUBJECTIVE AND OBJECTIVE BOX
MEJAI LATIF:[id number],   82yMale followed for:  penicillin (Rash)    PAST MEDICAL & SURGICAL HISTORY:  Coronary artery disease: s/p 3 stents on June 30, 2017 at [place] Dr. Lavelle Reid  OA (osteoarthritis) of knee: right  Former smoker, stopped smoking in distant past  Former smoker  Rheumatoid arthritis  Seizure disorder: 1 episode approximately 15 yrs ago  Asthma  Hyperlipidemia  BPH (benign prostatic hyperplasia)  HTN (hypertension)  Esophagus cancer  Chronic allergic rhinitis  BCC (basal cell carcinoma): skin  Cerebrovascular accident (CVA)  Anxiety  History of tonsillectomy  H/O heart artery stent: June 30, 2017  Knee arthropathy: left  History of total hip replacement: left  History of hernia repair    FAMILY HISTORY:  Family history of heart attack (Sibling)  Family history of pulmonary embolism (Father): father @ 49 yr old  FH: CAD (coronary artery disease) (Mother)    MEDICATIONS  (STANDING):  dornase ziyad Solution 2.5 milliGRAM(s) Inhalation daily  ALBUTerol/ipratropium for Nebulization 3 milliLiter(s) Nebulizer every 6 hours  dipyridamole 200 mG/aspirin 25 mG 1 Capsule(s) Oral two times a day  heparin  Injectable 5000 Unit(s) SubCutaneous every 12 hours  finasteride 5 milliGRAM(s) Oral daily  predniSONE   Tablet 5 milliGRAM(s) Oral daily  tamsulosin 0.4 milliGRAM(s) Oral at bedtime  phenytoin   Capsule 200 milliGRAM(s) Oral two times a day  simvastatin 40 milliGRAM(s) Oral at bedtime  ALPRAZolam 0.25 milliGRAM(s) Oral two times a day  docusate sodium 100 milliGRAM(s) Oral two times a day  pantoprazole    Tablet 40 milliGRAM(s) Oral daily    MEDICATIONS  (PRN):  ondansetron Injectable 4 milliGRAM(s) IV Push every 6 hours PRN Nausea  ALPRAZolam 0.25 milliGRAM(s) Oral every 8 hours PRN anxiety or insomnia      Vital Signs Last 24 Hrs  T(C): 36.9 (28 Aug 2017 05:19), Max: 37.3 (27 Aug 2017 13:47)  T(F): 98.5 (28 Aug 2017 05:19), Max: 99.1 (27 Aug 2017 13:47)  HR: 95 (28 Aug 2017 05:19) (82 - 97)  BP: 124/48 (28 Aug 2017 05:19) (107/48 - 134/54)  BP(mean): --  RR: 18 (28 Aug 2017 05:19) (18 - 20)  SpO2: 98% (28 Aug 2017 05:19) (97% - 100%)  nc/at  s1s2  cta  soft, nt, nd no guarding or rebound  no c/c/e    CBC Full  -  ( 28 Aug 2017 05:55 )  WBC Count : 12.07 K/uL  Hemoglobin : 8.6 g/dL  Hematocrit : 27.4 %  Platelet Count - Automated : 253 K/uL  Mean Cell Volume : 98.6 fL  Mean Cell Hemoglobin : 30.9 pg  Mean Cell Hemoglobin Concentration : 31.4 %  Auto Neutrophil # : x  Auto Lymphocyte # : x  Auto Monocyte # : x  Auto Eosinophil # : x  Auto Basophil # : x  Auto Neutrophil % : x  Auto Lymphocyte % : x  Auto Monocyte % : x  Auto Eosinophil % : x  Auto Basophil % : x    08-27    146<H>  |  107  |  17  ----------------------------<  146<H>  3.5   |  30  |  0.64    Ca    8.5      27 Aug 2017 05:32

## 2017-08-28 NOTE — PROGRESS NOTE ADULT - SUBJECTIVE AND OBJECTIVE BOX
D Team Surgery    STATUS POST:  Dillon logan with feeding jejunostomy    POST OPERATIVE DAY #: 14    Vital Signs Last 24 Hrs  T(C): 37.3 (28 Aug 2017 08:04), Max: 37.3 (27 Aug 2017 13:47)  T(F): 99.1 (28 Aug 2017 08:04), Max: 99.1 (27 Aug 2017 13:47)  HR: 90 (28 Aug 2017 08:04) (82 - 97)  BP: 130/58 (28 Aug 2017 08:04) (107/48 - 134/54)  BP(mean): --  RR: 18 (28 Aug 2017 08:04) (18 - 18)  SpO2: 98% (28 Aug 2017 08:04) (97% - 100%)      SUBJECTIVE: Pt doing well, no acute events.  Tolerating TF at goal.  +/+ GI function.  Denies N/V.      OBJECTIVE: T(C): 37.3 (28 Aug 2017 08:04), Max: 37.3 (27 Aug 2017 13:47)  HR: 90 (28 Aug 2017 08:04) (82 - 97)  BP: 130/58 (28 Aug 2017 08:04) (107/48 - 134/54)  RR: 18 (28 Aug 2017 08:04) (18 - 18)  SpO2: 98% (28 Aug 2017 08:04) (97% - 100%)  Wt(kg): --  I&O's Summary    27 Aug 2017 07:01  -  28 Aug 2017 07:00  --------------------------------------------------------  IN: 1668 mL / OUT: 1480 mL / NET: 188 mL    28 Aug 2017 07:01  -  28 Aug 2017 08:48  --------------------------------------------------------  IN: 147 mL / OUT: 125 mL / NET: 22 mL      I&O's Detail    27 Aug 2017 07:01  -  28 Aug 2017 07:00  --------------------------------------------------------  IN:    Enteral Tube Flush: 1200 mL    Packed Red Blood Cells: 300 mL    TwoCal HN: 168 mL  Total IN: 1668 mL    OUT:    Bulb: 110 mL    Voided: 1370 mL  Total OUT: 1480 mL    Total NET: 188 mL      28 Aug 2017 07:01  -  28 Aug 2017 08:48  --------------------------------------------------------  IN:    IV PiggyBack: 63 mL    TwoCal HN: 84 mL  Total IN: 147 mL    OUT:    Voided: 125 mL  Total OUT: 125 mL    Total NET: 22 mL        General: NAD  Abdomen: soft, ND, NT, GINA: serous, J tube in place    MEDICATIONS  (STANDING):  dornase ziyad Solution 2.5 milliGRAM(s) Inhalation daily  ALBUTerol/ipratropium for Nebulization 3 milliLiter(s) Nebulizer every 6 hours  dipyridamole 200 mG/aspirin 25 mG 1 Capsule(s) Oral two times a day  heparin  Injectable 5000 Unit(s) SubCutaneous every 12 hours  finasteride 5 milliGRAM(s) Oral daily  predniSONE   Tablet 5 milliGRAM(s) Oral daily  tamsulosin 0.4 milliGRAM(s) Oral at bedtime  phenytoin   Capsule 200 milliGRAM(s) Oral two times a day  simvastatin 40 milliGRAM(s) Oral at bedtime  ALPRAZolam 0.25 milliGRAM(s) Oral two times a day  docusate sodium 100 milliGRAM(s) Oral two times a day  pantoprazole    Tablet 40 milliGRAM(s) Oral daily    MEDICATIONS  (PRN):  ondansetron Injectable 4 milliGRAM(s) IV Push every 6 hours PRN Nausea  ALPRAZolam 0.25 milliGRAM(s) Oral every 8 hours PRN anxiety or insomnia  acetaminophen    Suspension. 650 milliGRAM(s) Enteral Tube every 6 hours PRN Mild Pain (1 - 3)      LABS:                        8.6    12.07 )-----------( 253      ( 28 Aug 2017 05:55 )             27.4     08-28    145  |  106  |  16  ----------------------------<  143<H>  3.7   |  29  |  0.68    Ca    8.7      28 Aug 2017 05:55  Phos  2.3     08-28  Mg     2.0     08-28            RADIOLOGY & ADDITIONAL STUDIES:    Assessment/Plan: 82y Male s/p maryjane ford with feeding J-tube  - NPO w/ TF  - monitor drain output  - PRN pain control  - strict I & O  - VTE ppx

## 2017-08-28 NOTE — PROGRESS NOTE BEHAVIORAL HEALTH - SUMMARY
81yo M w/ PPHx anxiety w/ hx of esophageal cancer admitted for elective robotic-assisted laparoscopic [place] esophagogastrectomy (POD #11) w/ feeding J-tube. Hospital course complicated by esophagram demonstrating contained esophageal leak. On inial interview, pt had reported severe anxiety and insomnia related to current medical problems and hospitalization. Of note, pt in outpt psych treatment, prescribed Xanax 0.25mg QID (for 15+ years), but has not been taking this while in the hospital due to NPO status. Current symptoms of anxiety and insomnia likely related to lifestyle changes (2/2 to surgery and hospitalization) in addition to benzo withdrawal. Pt started on equivalent dosing of IV Ativan yesterday, however, given over-sedation, dose decreased (per primary team). Pt reports significant improvement in anxiety and insomnia. Agree w/ primary team, re: Ativan dosing, will continue with current regimen.
83yo M w/ PPHx anxiety w/ hx of esophageal cancer admitted for elective robotic-assisted laparoscopic Vance Trent esophagogastrectomy (POD #11) w/ feeding J-tube. Hospital course complicated by esophagram demonstrating contained esophageal leak. On inial interview, pt had reported severe anxiety and insomnia related to current medical problems and hospitalization. Of note, pt in outpt psych treatment, prescribed Xanax 0.25mg QID (for 15+ years), but has not been taking this while in the hospital due to NPO status. Symptoms of anxiety and insomnia likely related to lifestyle changes (2/2 to surgery and hospitalization) in addition to benzo withdrawal. Pt started on IV Ativan however, with significant improvement in anxiety and insomnia. IV Ativan changed to PO Xanax over the weekend, with continued good effect.

## 2017-08-28 NOTE — PROGRESS NOTE BEHAVIORAL HEALTH - RISK ASSESSMENT
Pt low risk. No hx of past psych hospitalizations or SAs. Pt denies SI, has strong social supports, and is future-oriented. Risk factors include current anxiety, insomnia, and acute medical illness (with guarded prognosis). Pt does not require psych hospitalization, can continue to follow-up with outpatient psychiatrist.
Pt low risk. No hx of past psych hospitalizations or SAs. Pt denies SI, has strong social supports, and is future-oriented. Risk factors include current anxiety, insomnia, and acute medical illness (with guarded prognosis). Pt does not require psych hospitalization, can continue to follow-up with outpatient psychiatrist.

## 2017-08-28 NOTE — PROGRESS NOTE BEHAVIORAL HEALTH - NSBHCHARTREVIEWVS_PSY_A_CORE FT
Vital Signs Last 24 Hrs  T(C): 36.8 (25 Aug 2017 09:33), Max: 36.9 (24 Aug 2017 11:56)  T(F): 98.2 (25 Aug 2017 09:33), Max: 98.5 (24 Aug 2017 11:56)  HR: 90 (25 Aug 2017 09:33) (72 - 93)  BP: 107/59 (25 Aug 2017 09:33) (100/79 - 139/56)  BP(mean): --  RR: 18 (25 Aug 2017 09:33) (17 - 18)  SpO2: 100% (25 Aug 2017 09:33) (91% - 100%)
Vital Signs Last 24 Hrs  T(C): 36.8 (28 Aug 2017 12:42), Max: 37.3 (28 Aug 2017 08:04)  T(F): 98.3 (28 Aug 2017 12:42), Max: 99.1 (28 Aug 2017 08:04)  HR: 82 (28 Aug 2017 12:42) (78 - 95)  BP: 126/52 (28 Aug 2017 12:42) (107/48 - 130/58)  BP(mean): --  RR: 18 (28 Aug 2017 12:42) (18 - 18)  SpO2: 95% (28 Aug 2017 12:42) (95% - 100%)

## 2017-09-11 PROBLEM — I25.10 CORONARY ARTERY DISEASE: Status: ACTIVE | Noted: 2017-09-11

## 2017-09-12 ENCOUNTER — APPOINTMENT (OUTPATIENT)
Dept: THORACIC SURGERY | Facility: CLINIC | Age: 82
End: 2017-09-12

## 2017-09-12 ENCOUNTER — APPOINTMENT (OUTPATIENT)
Dept: SURGICAL ONCOLOGY | Facility: CLINIC | Age: 82
End: 2017-09-12
Payer: MEDICARE

## 2017-09-12 VITALS
HEART RATE: 96 BPM | HEIGHT: 65 IN | BODY MASS INDEX: 23.99 KG/M2 | OXYGEN SATURATION: 97 % | WEIGHT: 144 LBS | RESPIRATION RATE: 15 BRPM | SYSTOLIC BLOOD PRESSURE: 112 MMHG | DIASTOLIC BLOOD PRESSURE: 67 MMHG

## 2017-09-12 DIAGNOSIS — K91.89 OTHER POSTPROCEDURAL COMPLICATIONS AND DISORDERS OF DIGESTIVE SYSTEM: ICD-10-CM

## 2017-09-12 DIAGNOSIS — I25.10 ATHEROSCLEROTIC HEART DISEASE OF NATIVE CORONARY ARTERY W/OUT ANGINA PECTORIS: ICD-10-CM

## 2017-09-12 PROCEDURE — 99024 POSTOP FOLLOW-UP VISIT: CPT | Mod: PD

## 2017-09-12 RX ORDER — TRAMADOL HYDROCHLORIDE 50 MG/1
50 TABLET, COATED ORAL
Qty: 60 | Refills: 0 | Status: DISCONTINUED | COMMUNITY
Start: 2017-06-19 | End: 2017-09-12

## 2017-09-12 RX ORDER — SUCRALFATE 1 G/1
1 TABLET ORAL
Qty: 120 | Refills: 0 | Status: DISCONTINUED | COMMUNITY
Start: 2017-06-19 | End: 2017-09-12

## 2017-09-12 RX ORDER — CITALOPRAM HYDROBROMIDE 20 MG/1
20 TABLET, FILM COATED ORAL
Qty: 30 | Refills: 0 | Status: DISCONTINUED | COMMUNITY
Start: 2017-06-19 | End: 2017-09-12

## 2017-09-12 RX ORDER — OXYCODONE AND ACETAMINOPHEN 5; 325 MG/1; MG/1
5-325 TABLET ORAL
Qty: 5 | Refills: 0 | Status: DISCONTINUED | COMMUNITY
Start: 2017-04-04 | End: 2017-09-12

## 2017-09-12 RX ORDER — OMEPRAZOLE 40 MG/1
40 CAPSULE, DELAYED RELEASE ORAL
Qty: 60 | Refills: 0 | Status: DISCONTINUED | COMMUNITY
Start: 2017-02-08 | End: 2017-09-12

## 2017-09-14 ENCOUNTER — INPATIENT (INPATIENT)
Facility: HOSPITAL | Age: 82
LOS: 5 days | Discharge: NURSING, REHAB OR LONG-TERM CARE | DRG: 862 | End: 2017-09-20
Attending: SURGERY | Admitting: SURGERY
Payer: MEDICARE

## 2017-09-14 VITALS
SYSTOLIC BLOOD PRESSURE: 85 MMHG | HEART RATE: 100 BPM | DIASTOLIC BLOOD PRESSURE: 50 MMHG | TEMPERATURE: 98 F | RESPIRATION RATE: 18 BRPM | OXYGEN SATURATION: 95 %

## 2017-09-14 DIAGNOSIS — Z96.649 PRESENCE OF UNSPECIFIED ARTIFICIAL HIP JOINT: Chronic | ICD-10-CM

## 2017-09-14 DIAGNOSIS — Z98.890 OTHER SPECIFIED POSTPROCEDURAL STATES: Chronic | ICD-10-CM

## 2017-09-14 DIAGNOSIS — Z95.5 PRESENCE OF CORONARY ANGIOPLASTY IMPLANT AND GRAFT: Chronic | ICD-10-CM

## 2017-09-14 DIAGNOSIS — Z90.89 ACQUIRED ABSENCE OF OTHER ORGANS: Chronic | ICD-10-CM

## 2017-09-14 DIAGNOSIS — M12.9 ARTHROPATHY, UNSPECIFIED: Chronic | ICD-10-CM

## 2017-09-14 PROCEDURE — 93010 ELECTROCARDIOGRAM REPORT: CPT

## 2017-09-14 PROCEDURE — 99284 EMERGENCY DEPT VISIT MOD MDM: CPT | Mod: 25,GC

## 2017-09-14 NOTE — ED PROVIDER NOTE - OBJECTIVE STATEMENT
Pt is a 82M pmh esophageal CA w/ esophageal removal Aug 14, HTN, HTLD, CAD w/ 3 stents, here per orders from [facility] to have a CT of his chest performed to evaluate for esophageal leak. After the surgery the pt was admitted for post surgicial care to the CT ICU at [facility] for 9 days followed by another 5 days spent in patient. On Aug 28 he was transferred to [facility]. Ever since the surgery pt has had blood in his stool and a new cough.

## 2017-09-14 NOTE — ED ADULT NURSE NOTE - OBJECTIVE STATEMENT
82 year old male alert and oriented x 4 came to the ED by EMS from [facility] for clogged feeding tube.  Patient had feeding tube placed 8/2017 secondary to esophageal cancer.  Patient said the tube became clogged today and was previously working fine.  Patient is tolerating tube feedings.  Tube is on left lower abdomen and site is clean and dry.  Patient denies drainage from tube or around tube.  Patient denies; chest pain, shortness of breath, nausea, vomiting, diarrhea, fevers, chills.  Abdomen is soft and non-tender.  Patient placed on oxygen by EMS because Spo2 was 91% and they said patient appeared dyspneic.  Patient has equal and symmetrical chest rise and no increased work of breathing noted.  Patient arrived with IV from [facility] in the left forearm.  Safety ensured and family at the bedside.

## 2017-09-14 NOTE — CONSULT NOTE ADULT - SUBJECTIVE AND OBJECTIVE BOX
[facility] Consult Note (Manisha)    82 M presents with non-functioning J tube and SOB. Patient recently underwent a robotic-assited [place]-Trent esophagogastrectomy with feeding J tube placement for esophageal adenocarcinoma with Dr. Dyer and Christian Weinstein on 8/14/17. After surgery, patient was noted to have a leak and was discharged to rehab on only tube feeds. On day of evaluation, patient's J tube was unable to be used, and was sent to the ED for evaluation. Patient also complains of SOB, but no chest pain or abdominal pain.    ROS: 10 point ROS otherwise negative  PMH/PSH: esophageal adenocarcinoma s/p [place]-Trent, anxiety, asthma, CVA, CAD s/p stents, HTN, HLD, OA, RA, seizure disorder  Social: Former smoker  Family:  Not significant    Tmax: 36.8 (09-14-17 @ 21:59)  HR: 100  BP: 85/50  RR: 18  SpO2: 95%    Gen: NAD  Lungs: Non-labored breathing  Heart: S1, S2  Abdomen: Soft, ND, NTP. J tube in place.  Extremities: No deformities

## 2017-09-14 NOTE — ED PROVIDER NOTE - PMH
Anxiety    Asthma    BCC (basal cell carcinoma)  skin  BPH (benign prostatic hyperplasia)    Cerebrovascular accident (CVA)    Chronic allergic rhinitis    Coronary artery disease  s/p 3 stents on June 30, 2017 at [place] Dr. Lavelle Reid  Esophagus cancer    Former smoker    Former smoker, stopped smoking in distant past    HTN (hypertension)    Hyperlipidemia    OA (osteoarthritis) of knee  right  Rheumatoid arthritis    Seizure disorder  1 episode approximately 15 yrs ago

## 2017-09-14 NOTE — ED ADULT NURSE NOTE - PMH
Anxiety    Asthma    BCC (basal cell carcinoma)  skin  BPH (benign prostatic hyperplasia)    Cerebrovascular accident (CVA)    Chronic allergic rhinitis    Coronary artery disease  s/p 3 stents on June 30, 2017 at [place] Dr. Lavelle Reid  Esophagus cancer    Former smoker    Former smoker, stopped smoking in distant past    HTN (hypertension)    Hyperlipidemia    OA (osteoarthritis) of knee  right  Rheumatoid arthritis    Seizure disorder  1 episode approximately 15 yrs ago

## 2017-09-14 NOTE — ED PROVIDER NOTE - ATTENDING CONTRIBUTION TO CARE
Attending MD Huitron.  Agree with above.  Pt is an 82 yr old male presenting to ED with complaint of non-functioning J-tube and SOB.  Pt recently had robotic-assisted Vance-Trent esophagogastrectomy with fedding J tube placement for dx of esophageal adenocarcinoma with Dr. Dyer and Christian Weinstein on 8/14/17.  Pt had a leak and was d/c’d to rehab on tube feeds only.  Pt endorses SOB without CP or abdominal pain.  Was found to have non-functioning J-tube at evaluation as outpt.  Sent to ED for tube change/revision.  Pt has no focal LE edema that is new for him.  No CP/cough.  States that SOB has not changed.  Pt is not febrile.  PT is anticoagulated with agrenox.  No vomiting.  Pt is well appearing however vital signs c/w mild tachycardia, hypotension.  Pt’s O2 sats are 89%.  Planned CT chest with IV and oral contrast per surg recs.  Pt and wife confirm no hx of contrast allergies.  Will place on O2 and obtain imaging.  Pt’s abdomen is soft, breath sounds with crackles at bilateral bases.

## 2017-09-14 NOTE — ED PROVIDER NOTE - MEDICAL DECISION MAKING DETAILS
82M multiple comorbities, s/p esophageal removal 1 month ago w/ multiple post surgical complications, here being assessed for GI leak with CT chest. Plan for CTA chest w/ IV contrast, likely admit to surgery for further mgmt 82M multiple comorbities, s/p esophageal removal 1 month ago w/ multiple post surgical complications, here being assessed for GI leak with CT chest. Plan for CT chest and CTA chest w/ IV contrast with likely admission to surgery for further mgmt

## 2017-09-14 NOTE — ED PROVIDER NOTE - PROGRESS NOTE DETAILS
ARMY:  Discussed protocol for CT with surgery and with radiology.  Radiology reporting that a CT chest with IV and oral contrast will be subpar and that in order to evaluate the leak pt will need a fluoroscopic study in AM.  Discussed the concern for leak and secondarily concern for PE.  Radiology in agreement that any CT at this time will be insufficient to evaluate leak and that ruling out PE with ability to then obtain fluoroscopic study in AM makes most sense.  Discussed with pt.  Will discuss with surgery and order accordingly. [facility]:  Surg requesting admission to their service for increased effusion, increased WBC.  U/A, blood cultures added.  Surg recommending no abxs at this time.

## 2017-09-15 ENCOUNTER — RESULT REVIEW (OUTPATIENT)
Age: 82
End: 2017-09-15

## 2017-09-15 DIAGNOSIS — D72.829 ELEVATED WHITE BLOOD CELL COUNT, UNSPECIFIED: ICD-10-CM

## 2017-09-15 DIAGNOSIS — R09.02 HYPOXEMIA: ICD-10-CM

## 2017-09-15 DIAGNOSIS — J90 PLEURAL EFFUSION, NOT ELSEWHERE CLASSIFIED: ICD-10-CM

## 2017-09-15 DIAGNOSIS — R06.02 SHORTNESS OF BREATH: ICD-10-CM

## 2017-09-15 DIAGNOSIS — K91.89 OTHER POSTPROCEDURAL COMPLICATIONS AND DISORDERS OF DIGESTIVE SYSTEM: ICD-10-CM

## 2017-09-15 LAB
ALBUMIN SERPL ELPH-MCNC: 2.3 G/DL — LOW (ref 3.3–5)
ALP SERPL-CCNC: 215 U/L — HIGH (ref 40–120)
ALT FLD-CCNC: 36 U/L RC — SIGNIFICANT CHANGE UP (ref 10–45)
ANION GAP SERPL CALC-SCNC: 13 MMOL/L — SIGNIFICANT CHANGE UP (ref 5–17)
ANION GAP SERPL CALC-SCNC: 14 MMOL/L — SIGNIFICANT CHANGE UP (ref 5–17)
APPEARANCE UR: CLEAR — SIGNIFICANT CHANGE UP
APTT BLD: 29.2 SEC — SIGNIFICANT CHANGE UP (ref 27.5–37.4)
AST SERPL-CCNC: 35 U/L — SIGNIFICANT CHANGE UP (ref 10–40)
BASE EXCESS BLDV CALC-SCNC: 1 MMOL/L — SIGNIFICANT CHANGE UP (ref -2–2)
BASE EXCESS BLDV CALC-SCNC: 2.4 MMOL/L — HIGH (ref -2–2)
BILIRUB SERPL-MCNC: 0.5 MG/DL — SIGNIFICANT CHANGE UP (ref 0.2–1.2)
BILIRUB UR-MCNC: NEGATIVE — SIGNIFICANT CHANGE UP
BUN SERPL-MCNC: 42 MG/DL — HIGH (ref 7–23)
BUN SERPL-MCNC: 42 MG/DL — HIGH (ref 7–23)
CA-I SERPL-SCNC: 1.27 MMOL/L — SIGNIFICANT CHANGE UP (ref 1.12–1.3)
CA-I SERPL-SCNC: 1.3 MMOL/L — SIGNIFICANT CHANGE UP (ref 1.12–1.3)
CALCIUM SERPL-MCNC: 9 MG/DL — SIGNIFICANT CHANGE UP (ref 8.4–10.5)
CALCIUM SERPL-MCNC: 9.1 MG/DL — SIGNIFICANT CHANGE UP (ref 8.4–10.5)
CHLORIDE BLDV-SCNC: 105 MMOL/L — SIGNIFICANT CHANGE UP (ref 96–108)
CHLORIDE BLDV-SCNC: 108 MMOL/L — SIGNIFICANT CHANGE UP (ref 96–108)
CHLORIDE SERPL-SCNC: 104 MMOL/L — SIGNIFICANT CHANGE UP (ref 96–108)
CHLORIDE SERPL-SCNC: 105 MMOL/L — SIGNIFICANT CHANGE UP (ref 96–108)
CO2 BLDV-SCNC: 26 MMOL/L — SIGNIFICANT CHANGE UP (ref 22–30)
CO2 BLDV-SCNC: 28 MMOL/L — SIGNIFICANT CHANGE UP (ref 22–30)
CO2 SERPL-SCNC: 23 MMOL/L — SIGNIFICANT CHANGE UP (ref 22–31)
CO2 SERPL-SCNC: 25 MMOL/L — SIGNIFICANT CHANGE UP (ref 22–31)
COLOR SPEC: YELLOW — SIGNIFICANT CHANGE UP
CREAT SERPL-MCNC: 0.83 MG/DL — SIGNIFICANT CHANGE UP (ref 0.5–1.3)
CREAT SERPL-MCNC: 0.87 MG/DL — SIGNIFICANT CHANGE UP (ref 0.5–1.3)
DIFF PNL FLD: NEGATIVE — SIGNIFICANT CHANGE UP
EPI CELLS # UR: SIGNIFICANT CHANGE UP /HPF
GAS PNL BLDV: 136 MMOL/L — SIGNIFICANT CHANGE UP (ref 136–145)
GAS PNL BLDV: 138 MMOL/L — SIGNIFICANT CHANGE UP (ref 136–145)
GAS PNL BLDV: SIGNIFICANT CHANGE UP
GLUCOSE BLDV-MCNC: 86 MG/DL — SIGNIFICANT CHANGE UP (ref 70–99)
GLUCOSE BLDV-MCNC: 88 MG/DL — SIGNIFICANT CHANGE UP (ref 70–99)
GLUCOSE SERPL-MCNC: 82 MG/DL — SIGNIFICANT CHANGE UP (ref 70–99)
GLUCOSE SERPL-MCNC: 85 MG/DL — SIGNIFICANT CHANGE UP (ref 70–99)
GLUCOSE UR QL: NEGATIVE — SIGNIFICANT CHANGE UP
HCO3 BLDV-SCNC: 25 MMOL/L — SIGNIFICANT CHANGE UP (ref 21–29)
HCO3 BLDV-SCNC: 27 MMOL/L — SIGNIFICANT CHANGE UP (ref 21–29)
HCT VFR BLD CALC: 24.7 % — LOW (ref 39–50)
HCT VFR BLD CALC: 25.2 % — LOW (ref 39–50)
HCT VFR BLD CALC: 26.1 % — LOW (ref 39–50)
HCT VFR BLDA CALC: 24 % — LOW (ref 39–50)
HCT VFR BLDA CALC: 29 % — LOW (ref 39–50)
HGB BLD CALC-MCNC: 7.6 G/DL — LOW (ref 13–17)
HGB BLD CALC-MCNC: 9.3 G/DL — LOW (ref 13–17)
HGB BLD-MCNC: 8.2 G/DL — LOW (ref 13–17)
HGB BLD-MCNC: 8.4 G/DL — LOW (ref 13–17)
HGB BLD-MCNC: 8.7 G/DL — LOW (ref 13–17)
INR BLD: 1.35 RATIO — HIGH (ref 0.88–1.16)
KETONES UR-MCNC: NEGATIVE — SIGNIFICANT CHANGE UP
LACTATE BLDV-MCNC: 0.7 MMOL/L — SIGNIFICANT CHANGE UP (ref 0.7–2)
LACTATE BLDV-MCNC: 0.7 MMOL/L — SIGNIFICANT CHANGE UP (ref 0.7–2)
LDH SERPL L TO P-CCNC: 149 U/L — SIGNIFICANT CHANGE UP
LEUKOCYTE ESTERASE UR-ACNC: NEGATIVE — SIGNIFICANT CHANGE UP
MCHC RBC-ENTMCNC: 31.7 PG — SIGNIFICANT CHANGE UP (ref 27–34)
MCHC RBC-ENTMCNC: 32 PG — SIGNIFICANT CHANGE UP (ref 27–34)
MCHC RBC-ENTMCNC: 32.5 GM/DL — SIGNIFICANT CHANGE UP (ref 32–36)
MCHC RBC-ENTMCNC: 32.9 PG — SIGNIFICANT CHANGE UP (ref 27–34)
MCHC RBC-ENTMCNC: 33.3 GM/DL — SIGNIFICANT CHANGE UP (ref 32–36)
MCHC RBC-ENTMCNC: 34 GM/DL — SIGNIFICANT CHANGE UP (ref 32–36)
MCV RBC AUTO: 96.1 FL — SIGNIFICANT CHANGE UP (ref 80–100)
MCV RBC AUTO: 96.6 FL — SIGNIFICANT CHANGE UP (ref 80–100)
MCV RBC AUTO: 97.7 FL — SIGNIFICANT CHANGE UP (ref 80–100)
NITRITE UR-MCNC: NEGATIVE — SIGNIFICANT CHANGE UP
NT-PROBNP SERPL-SCNC: 1515 PG/ML — HIGH (ref 0–300)
PCO2 BLDV: 41 MMHG — SIGNIFICANT CHANGE UP (ref 35–50)
PCO2 BLDV: 43 MMHG — SIGNIFICANT CHANGE UP (ref 35–50)
PH BLDV: 7.4 — SIGNIFICANT CHANGE UP (ref 7.35–7.45)
PH BLDV: 7.41 — SIGNIFICANT CHANGE UP (ref 7.35–7.45)
PH UR: 6 — SIGNIFICANT CHANGE UP (ref 5–8)
PLATELET # BLD AUTO: 356 K/UL — SIGNIFICANT CHANGE UP (ref 150–400)
PLATELET # BLD AUTO: 358 K/UL — SIGNIFICANT CHANGE UP (ref 150–400)
PLATELET # BLD AUTO: 362 K/UL — SIGNIFICANT CHANGE UP (ref 150–400)
PO2 BLDV: 26 MMHG — SIGNIFICANT CHANGE UP (ref 25–45)
PO2 BLDV: 43 MMHG — SIGNIFICANT CHANGE UP (ref 25–45)
POTASSIUM BLDV-SCNC: 4.3 MMOL/L — SIGNIFICANT CHANGE UP (ref 3.5–5)
POTASSIUM BLDV-SCNC: 4.4 MMOL/L — SIGNIFICANT CHANGE UP (ref 3.5–5)
POTASSIUM SERPL-MCNC: 4.6 MMOL/L — SIGNIFICANT CHANGE UP (ref 3.5–5.3)
POTASSIUM SERPL-MCNC: 4.6 MMOL/L — SIGNIFICANT CHANGE UP (ref 3.5–5.3)
POTASSIUM SERPL-SCNC: 4.6 MMOL/L — SIGNIFICANT CHANGE UP (ref 3.5–5.3)
POTASSIUM SERPL-SCNC: 4.6 MMOL/L — SIGNIFICANT CHANGE UP (ref 3.5–5.3)
PROT FLD-MCNC: 3.3 G/DL — SIGNIFICANT CHANGE UP
PROT SERPL-MCNC: 7 G/DL — SIGNIFICANT CHANGE UP (ref 6–8.3)
PROT UR-MCNC: 30 MG/DL
PROTHROM AB SERPL-ACNC: 14.7 SEC — HIGH (ref 9.8–12.7)
RBC # BLD: 2.55 M/UL — LOW (ref 4.2–5.8)
RBC # BLD: 2.59 M/UL — LOW (ref 4.2–5.8)
RBC # BLD: 2.71 M/UL — LOW (ref 4.2–5.8)
RBC # FLD: 13.5 % — SIGNIFICANT CHANGE UP (ref 10.3–14.5)
RBC # FLD: 13.6 % — SIGNIFICANT CHANGE UP (ref 10.3–14.5)
RBC # FLD: 13.7 % — SIGNIFICANT CHANGE UP (ref 10.3–14.5)
SAO2 % BLDV: 36 % — LOW (ref 67–88)
SAO2 % BLDV: 69 % — SIGNIFICANT CHANGE UP (ref 67–88)
SODIUM SERPL-SCNC: 141 MMOL/L — SIGNIFICANT CHANGE UP (ref 135–145)
SODIUM SERPL-SCNC: 143 MMOL/L — SIGNIFICANT CHANGE UP (ref 135–145)
SP GR SPEC: >1.03 — HIGH (ref 1.01–1.02)
TRIGL FLD-MCNC: 21 MG/DL — SIGNIFICANT CHANGE UP
UROBILINOGEN FLD QL: 2
WBC # BLD: 13.7 K/UL — HIGH (ref 3.8–10.5)
WBC # BLD: 15.3 K/UL — HIGH (ref 3.8–10.5)
WBC # BLD: 16.6 K/UL — HIGH (ref 3.8–10.5)
WBC # FLD AUTO: 13.7 K/UL — HIGH (ref 3.8–10.5)
WBC # FLD AUTO: 15.3 K/UL — HIGH (ref 3.8–10.5)
WBC # FLD AUTO: 16.6 K/UL — HIGH (ref 3.8–10.5)
WBC UR QL: SIGNIFICANT CHANGE UP /HPF (ref 0–5)

## 2017-09-15 PROCEDURE — 99223 1ST HOSP IP/OBS HIGH 75: CPT

## 2017-09-15 PROCEDURE — 99291 CRITICAL CARE FIRST HOUR: CPT | Mod: 25

## 2017-09-15 PROCEDURE — 71010: CPT | Mod: 26

## 2017-09-15 PROCEDURE — 32555 ASPIRATE PLEURA W/ IMAGING: CPT

## 2017-09-15 PROCEDURE — 99222 1ST HOSP IP/OBS MODERATE 55: CPT | Mod: GC

## 2017-09-15 PROCEDURE — 71010: CPT | Mod: 26,77

## 2017-09-15 PROCEDURE — 71275 CT ANGIOGRAPHY CHEST: CPT | Mod: 26

## 2017-09-15 RX ORDER — MONTELUKAST 4 MG/1
10 TABLET, CHEWABLE ORAL DAILY
Qty: 0 | Refills: 0 | Status: DISCONTINUED | OUTPATIENT
Start: 2017-09-15 | End: 2017-09-15

## 2017-09-15 RX ORDER — FENTANYL CITRATE 50 UG/ML
100 INJECTION INTRAVENOUS ONCE
Qty: 0 | Refills: 0 | Status: DISCONTINUED | OUTPATIENT
Start: 2017-09-15 | End: 2017-09-15

## 2017-09-15 RX ORDER — LOSARTAN POTASSIUM 100 MG/1
50 TABLET, FILM COATED ORAL DAILY
Qty: 0 | Refills: 0 | Status: DISCONTINUED | OUTPATIENT
Start: 2017-09-15 | End: 2017-09-16

## 2017-09-15 RX ORDER — DOCUSATE SODIUM 100 MG
100 CAPSULE ORAL
Qty: 0 | Refills: 0 | Status: DISCONTINUED | OUTPATIENT
Start: 2017-09-15 | End: 2017-09-15

## 2017-09-15 RX ORDER — ALBUTEROL 90 UG/1
2 AEROSOL, METERED ORAL EVERY 6 HOURS
Qty: 0 | Refills: 0 | Status: DISCONTINUED | OUTPATIENT
Start: 2017-09-15 | End: 2017-09-15

## 2017-09-15 RX ORDER — LIDOCAINE 4 G/100G
1 CREAM TOPICAL ONCE
Qty: 0 | Refills: 0 | Status: COMPLETED | OUTPATIENT
Start: 2017-09-15 | End: 2017-09-15

## 2017-09-15 RX ORDER — TAMSULOSIN HYDROCHLORIDE 0.4 MG/1
0.4 CAPSULE ORAL AT BEDTIME
Qty: 0 | Refills: 0 | Status: DISCONTINUED | OUTPATIENT
Start: 2017-09-15 | End: 2017-09-15

## 2017-09-15 RX ORDER — VANCOMYCIN HCL 1 G
1000 VIAL (EA) INTRAVENOUS EVERY 12 HOURS
Qty: 0 | Refills: 0 | Status: DISCONTINUED | OUTPATIENT
Start: 2017-09-15 | End: 2017-09-15

## 2017-09-15 RX ORDER — CEFEPIME 1 G/1
1000 INJECTION, POWDER, FOR SOLUTION INTRAMUSCULAR; INTRAVENOUS EVERY 12 HOURS
Qty: 0 | Refills: 0 | Status: DISCONTINUED | OUTPATIENT
Start: 2017-09-15 | End: 2017-09-15

## 2017-09-15 RX ORDER — FOLIC ACID 0.8 MG
0.4 TABLET ORAL DAILY
Qty: 0 | Refills: 0 | Status: DISCONTINUED | OUTPATIENT
Start: 2017-09-15 | End: 2017-09-15

## 2017-09-15 RX ORDER — POLYETHYLENE GLYCOL 3350 17 G/17G
17 POWDER, FOR SOLUTION ORAL DAILY
Qty: 0 | Refills: 0 | Status: DISCONTINUED | OUTPATIENT
Start: 2017-09-15 | End: 2017-09-17

## 2017-09-15 RX ORDER — INFLUENZA VIRUS VACCINE 15; 15; 15; 15 UG/.5ML; UG/.5ML; UG/.5ML; UG/.5ML
0.5 SUSPENSION INTRAMUSCULAR ONCE
Qty: 0 | Refills: 0 | Status: DISCONTINUED | OUTPATIENT
Start: 2017-09-15 | End: 2017-09-20

## 2017-09-15 RX ORDER — PREGABALIN 225 MG/1
500 CAPSULE ORAL DAILY
Qty: 0 | Refills: 0 | Status: DISCONTINUED | OUTPATIENT
Start: 2017-09-15 | End: 2017-09-15

## 2017-09-15 RX ORDER — CEFEPIME 1 G/1
1000 INJECTION, POWDER, FOR SOLUTION INTRAMUSCULAR; INTRAVENOUS EVERY 12 HOURS
Qty: 0 | Refills: 0 | Status: DISCONTINUED | OUTPATIENT
Start: 2017-09-15 | End: 2017-09-20

## 2017-09-15 RX ORDER — SODIUM CHLORIDE 9 MG/ML
1000 INJECTION, SOLUTION INTRAVENOUS
Qty: 0 | Refills: 0 | Status: DISCONTINUED | OUTPATIENT
Start: 2017-09-15 | End: 2017-09-16

## 2017-09-15 RX ORDER — MONTELUKAST 4 MG/1
10 TABLET, CHEWABLE ORAL DAILY
Qty: 0 | Refills: 0 | Status: DISCONTINUED | OUTPATIENT
Start: 2017-09-15 | End: 2017-09-16

## 2017-09-15 RX ORDER — SIMVASTATIN 20 MG/1
40 TABLET, FILM COATED ORAL AT BEDTIME
Qty: 0 | Refills: 0 | Status: DISCONTINUED | OUTPATIENT
Start: 2017-09-15 | End: 2017-09-15

## 2017-09-15 RX ORDER — IPRATROPIUM/ALBUTEROL SULFATE 18-103MCG
3 AEROSOL WITH ADAPTER (GRAM) INHALATION EVERY 6 HOURS
Qty: 0 | Refills: 0 | Status: DISCONTINUED | OUTPATIENT
Start: 2017-09-15 | End: 2017-09-19

## 2017-09-15 RX ORDER — MULTIVIT-MIN/FERROUS GLUCONATE 9 MG/15 ML
1 LIQUID (ML) ORAL
Qty: 0 | Refills: 0 | COMMUNITY

## 2017-09-15 RX ORDER — AMLODIPINE BESYLATE 2.5 MG/1
5 TABLET ORAL DAILY
Qty: 0 | Refills: 0 | Status: DISCONTINUED | OUTPATIENT
Start: 2017-09-15 | End: 2017-09-15

## 2017-09-15 RX ORDER — METRONIDAZOLE 500 MG
500 TABLET ORAL EVERY 8 HOURS
Qty: 0 | Refills: 0 | Status: DISCONTINUED | OUTPATIENT
Start: 2017-09-15 | End: 2017-09-15

## 2017-09-15 RX ORDER — FOLIC ACID 0.8 MG
0.4 TABLET ORAL DAILY
Qty: 0 | Refills: 0 | Status: DISCONTINUED | OUTPATIENT
Start: 2017-09-15 | End: 2017-09-16

## 2017-09-15 RX ORDER — ENOXAPARIN SODIUM 100 MG/ML
40 INJECTION SUBCUTANEOUS DAILY
Qty: 0 | Refills: 0 | Status: DISCONTINUED | OUTPATIENT
Start: 2017-09-15 | End: 2017-09-18

## 2017-09-15 RX ORDER — FINASTERIDE 5 MG/1
5 TABLET, FILM COATED ORAL DAILY
Qty: 0 | Refills: 0 | Status: DISCONTINUED | OUTPATIENT
Start: 2017-09-15 | End: 2017-09-16

## 2017-09-15 RX ORDER — METRONIDAZOLE 500 MG
500 TABLET ORAL EVERY 8 HOURS
Qty: 0 | Refills: 0 | Status: DISCONTINUED | OUTPATIENT
Start: 2017-09-15 | End: 2017-09-20

## 2017-09-15 RX ORDER — LOSARTAN POTASSIUM 100 MG/1
50 TABLET, FILM COATED ORAL DAILY
Qty: 0 | Refills: 0 | Status: DISCONTINUED | OUTPATIENT
Start: 2017-09-15 | End: 2017-09-15

## 2017-09-15 RX ORDER — VANCOMYCIN HCL 1 G
750 VIAL (EA) INTRAVENOUS EVERY 12 HOURS
Qty: 0 | Refills: 0 | Status: DISCONTINUED | OUTPATIENT
Start: 2017-09-15 | End: 2017-09-18

## 2017-09-15 RX ORDER — PREGABALIN 225 MG/1
500 CAPSULE ORAL DAILY
Qty: 0 | Refills: 0 | Status: DISCONTINUED | OUTPATIENT
Start: 2017-09-15 | End: 2017-09-16

## 2017-09-15 RX ORDER — FINASTERIDE 5 MG/1
5 TABLET, FILM COATED ORAL DAILY
Qty: 0 | Refills: 0 | Status: DISCONTINUED | OUTPATIENT
Start: 2017-09-15 | End: 2017-09-15

## 2017-09-15 RX ORDER — ACETAMINOPHEN 500 MG
1000 TABLET ORAL ONCE
Qty: 0 | Refills: 0 | Status: COMPLETED | OUTPATIENT
Start: 2017-09-15 | End: 2017-09-15

## 2017-09-15 RX ORDER — PYRIDOXINE HCL (VITAMIN B6) 100 MG
100 TABLET ORAL DAILY
Qty: 0 | Refills: 0 | Status: DISCONTINUED | OUTPATIENT
Start: 2017-09-15 | End: 2017-09-16

## 2017-09-15 RX ORDER — CLOPIDOGREL BISULFATE 75 MG/1
1 TABLET, FILM COATED ORAL
Qty: 0 | Refills: 0 | COMMUNITY

## 2017-09-15 RX ORDER — SIMVASTATIN 20 MG/1
40 TABLET, FILM COATED ORAL AT BEDTIME
Qty: 0 | Refills: 0 | Status: DISCONTINUED | OUTPATIENT
Start: 2017-09-15 | End: 2017-09-16

## 2017-09-15 RX ORDER — AMLODIPINE BESYLATE 2.5 MG/1
5 TABLET ORAL DAILY
Qty: 0 | Refills: 0 | Status: DISCONTINUED | OUTPATIENT
Start: 2017-09-15 | End: 2017-09-16

## 2017-09-15 RX ORDER — PYRIDOXINE HCL (VITAMIN B6) 100 MG
100 TABLET ORAL DAILY
Qty: 0 | Refills: 0 | Status: DISCONTINUED | OUTPATIENT
Start: 2017-09-15 | End: 2017-09-15

## 2017-09-15 RX ADMIN — Medication 100 MILLIGRAM(S): at 21:43

## 2017-09-15 RX ADMIN — FENTANYL CITRATE 100 MICROGRAM(S): 50 INJECTION INTRAVENOUS at 17:50

## 2017-09-15 RX ADMIN — Medication 1000 MILLIGRAM(S): at 23:06

## 2017-09-15 RX ADMIN — Medication 400 MILLIGRAM(S): at 22:36

## 2017-09-15 RX ADMIN — SODIUM CHLORIDE 75 MILLILITER(S): 9 INJECTION, SOLUTION INTRAVENOUS at 05:26

## 2017-09-15 RX ADMIN — Medication 100 MILLIGRAM(S): at 18:39

## 2017-09-15 RX ADMIN — Medication 250 MILLIGRAM(S): at 06:53

## 2017-09-15 RX ADMIN — CEFEPIME 100 MILLIGRAM(S): 1 INJECTION, POWDER, FOR SOLUTION INTRAMUSCULAR; INTRAVENOUS at 06:04

## 2017-09-15 RX ADMIN — SIMVASTATIN 40 MILLIGRAM(S): 20 TABLET, FILM COATED ORAL at 22:26

## 2017-09-15 RX ADMIN — LIDOCAINE 1 PATCH: 4 CREAM TOPICAL at 21:45

## 2017-09-15 RX ADMIN — Medication 100 MILLIGRAM(S): at 05:25

## 2017-09-15 RX ADMIN — Medication 3 MILLILITER(S): at 17:41

## 2017-09-15 RX ADMIN — Medication 100 MILLIGRAM(S): at 22:26

## 2017-09-15 RX ADMIN — Medication 150 MILLIGRAM(S): at 19:50

## 2017-09-15 RX ADMIN — Medication 3 MILLILITER(S): at 23:24

## 2017-09-15 RX ADMIN — FENTANYL CITRATE 100 MICROGRAM(S): 50 INJECTION INTRAVENOUS at 18:05

## 2017-09-15 RX ADMIN — CEFEPIME 100 MILLIGRAM(S): 1 INJECTION, POWDER, FOR SOLUTION INTRAMUSCULAR; INTRAVENOUS at 18:38

## 2017-09-15 NOTE — CONSULT NOTE ADULT - PROBLEM SELECTOR RECOMMENDATION 9
worsening hypoxia while in er, now on 100% NRB  CXR- with increased effusion on R side  pt with non/poorly function R CT  Thoracic f/u re: eval of CT, ? repostion CT    keep o2 sat>=90%, titrate down 100% NRB to venti mask  pt at risk for hypercarbia given emphysema history  would avoid BIPAP- concern it could worsen surgical suture site of esophagogastrectomy site worsening hypoxia while in er, now on 100% NRB  CXR- with increased effusion on R side  pt with non/poorly function R CT  Thoracic f/u re: eval of CT, ? repostion CT  check pro bnp, repeat cbc    keep o2 sat>=90%, titrate down 100% NRB to venti mask 55%  pt at risk for hypercarbia given emphysema history  would avoid BIPAP- concern it could worsen surgical suture site of esophagogastrectomy site worsening hypoxia while in er, now on 100% NRB  CXR- with increased effusion on R side  pt with non/poorly function R CT  Thoracic f/u re: eval of CT, ? repostion CT  check pro bnp- increase of R pleural effusion may be due to CHF-pt had been receiving IVF since for the past 10 hrs.   repeat cbc    keep o2 sat>=90%, titrate down 100% NRB to venti mask 55%  pt at risk for hypercarbia given emphysema history  would avoid BIPAP- concern it could worsen surgical suture site of esophagogastrectomy site worsening hypoxia while in er, now on 100% NRB  CXR- with increased effusion on R side  pt with non/poorly function R CT  Thoracic f/u re: eval of CT, ? reposition CT  check pro bnp- increase of R pleural effusion may be due to CHF-pt had been receiving IVF since for the past 10 hrs.   repeat cbc    keep o2 sat>=90%, titrate down 100% NRB to venti mask 55%  pt at risk for hypercarbia given emphysema history  would avoid BIPAP- concern it could worsen surgical suture site of esophagogastrectomy site

## 2017-09-15 NOTE — ED ADULT NURSE REASSESSMENT NOTE - NS ED NURSE REASSESS COMMENT FT1
pt. in hallway and requests to sit up in bed bc he has "bed sores." Pt. offered to have cream and pad applied but pt. refused. Pt. reports relief upon sitting up

## 2017-09-15 NOTE — H&P ADULT - NSHPPHYSICALEXAM_GEN_ALL_CORE
Tmax: 36.8 (09-14-17 @ 21:59)  HR: 99  BP: 106/68  RR: 18  SpO2: 99%    Gen: NAD  Lungs: Non-labored breathing  Chest: R tube in place  Heart: S1, S2  Abdomen: Soft, ND, NTP. J tube in place.  Extremities: No deformities

## 2017-09-15 NOTE — H&P ADULT - HISTORY OF PRESENT ILLNESS
82 M presents with non-functioning J tube and SOB. 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 Dyer and Dr. Christian Weinstein on 8/14/17. After surgery, patient was found to have an anastomotic leak and was treated conservatively with NPO, antibiotics, chest tube drainage, and J tube feeding. Patient was eventually discharged to rehab. On day of evaluation, patient's J tube was unable to be used, and was sent to the ED for evaluation. Patient also complains of SOB, but no chest pain or abdominal pain. Denies fever or chills. Patient was taking medications by mouth, but was only getting nutrition via his J tube.

## 2017-09-15 NOTE — CONSULT NOTE ADULT - SUBJECTIVE AND OBJECTIVE BOX
Patient is a 82y old  Male who presents with a chief complaint of Non-functioning J tube and SOB (15 Sep 2017 03:11)      HPI:  82 M presents with non-functioning J tube and SOB. Patient recently underwent a robotic-assisted Bowlegs-Trent esophagogastrectomy with feeding J tube placement for esophageal adenocarcinoma (T1aN0) with Dr. Dyer and Dr. Christian Weinstein on 8/14/17. After surgery, patient was found to have an anastomotic leak and was treated conservatively with NPO, antibiotics, chest tube drainage, and J tube feeding. Patient was eventually discharged to rehab. On day of evaluation, patient's J tube was unable to be used, and was sent to the ED for evaluation. Patient also complains of SOB, but no chest pain or abdominal pain. Denies fever or chills. Patient was taking medications by mouth, but was only getting nutrition via his J tube. (15 Sep 2017 03:11)      PAST MEDICAL & SURGICAL HISTORY:  Coronary artery disease: s/p 3 stents on June 30, 2017 at [facility] Dr. Lavelle Reid  OA (osteoarthritis) of knee: right  Former smoker, stopped smoking in distant past  Rheumatoid arthritis  Seizure disorder: 1 episode approximately 15 yrs ago  Asthma  Hyperlipidemia  BPH (benign prostatic hyperplasia)  HTN (hypertension)  Esophagus cancer  Chronic allergic rhinitis  BCC (basal cell carcinoma): skin  Cerebrovascular accident (CVA)  Anxiety  History of tonsillectomy  H/O heart artery stent: June 30, 2017  Knee arthropathy: left  History of total hip replacement: left  History of hernia repair      MEDICATIONS  (STANDING):  lactated ringers. 1000 milliLiter(s) (75 mL/Hr) IV Continuous <Continuous>  enoxaparin Injectable 40 milliGRAM(s) SubCutaneous daily  finasteride 5 milliGRAM(s) Oral daily  losartan 50 milliGRAM(s) Oral daily  tamsulosin 0.4 milliGRAM(s) Oral at bedtime  phenytoin   Capsule 100 milliGRAM(s) Oral two times a day  simvastatin 40 milliGRAM(s) Oral at bedtime  pyridoxine 100 milliGRAM(s) Oral daily  folic acid 0.4 milliGRAM(s) Oral daily  cyanocobalamin 500 MICROGram(s) Oral daily  montelukast 10 milliGRAM(s) Oral daily  docusate sodium 100 milliGRAM(s) Oral two times a day  amLODIPine   Tablet 5 milliGRAM(s) Oral daily  ALBUTerol/ipratropium for Nebulization 3 milliLiter(s) Nebulizer every 6 hours      Allergies    penicillin (Rash)    Intolerances        SOCIAL HISTORY:  Denies ETOh,Smoking,     FAMILY HISTORY:  Family history of heart attack (Sibling)  Family history of pulmonary embolism: father @ 49 yr old  FH: CAD (coronary artery disease)      REVIEW OF SYSTEMS:    CONSTITUTIONAL: No weakness, fevers or chills  EYES/ENT: No visual changes;  No vertigo or throat pain   NECK: No pain or stiffness  RESPIRATORY: No cough, wheezing, hemoptysis; No shortness of breath  CARDIOVASCULAR: No chest pain or palpitations  GASTROINTESTINAL: No abdominal or epigastric pain. No nausea, vomiting, or hematemesis; No diarrhea or constipation. No melena or hematochezia.  GENITOURINARY: No dysuria, frequency or hematuria  NEUROLOGICAL: No numbness or weakness  SKIN: No itching, burning, rashes, or lesions   All other review of systems is negative unless indicated above.    VITAL:  T(C): , Max: 36.8 (09-14-17 @ 21:59)  T(F): , Max: 98.3 (09-14-17 @ 21:59)  HR: 98 (09-15-17 @ 14:07)  BP: 124/83 (09-15-17 @ 14:07)  BP(mean): --  RR: 18 (09-15-17 @ 14:07)  SpO2: 100% (09-15-17 @ 14:07)  Wt(kg): --    I and O's:        PHYSICAL EXAM:    Constitutional: NAD  HEENT: PERRLA,   Neck: No JVD  Respiratory: CTA B/L  Cardiovascular: S1 and S2  Gastrointestinal: BS+, soft, NT/ND  Extremities: No peripheral edema  Neurological: A/O x 3, no focal deficits  Psychiatric: Normal mood, normal affect  : No López  Skin: No rashes  Access: Not applicable  Back: No CVA tenderness    LABS:                        8.2    15.3  )-----------( 362      ( 15 Sep 2017 05:56 )             25.2     09-15    143  |  105  |  42<H>  ----------------------------<  85  4.6   |  25  |  0.83    Ca    9.1      15 Sep 2017 05:56    TPro  7.0  /  Alb  2.3<L>  /  TBili  0.5  /  DBili  x   /  AST  35  /  ALT  36  /  AlkPhos  215<H>  09-14          RADIOLOGY & ADDITIONAL STUDIES:

## 2017-09-15 NOTE — CONSULT NOTE ADULT - SUBJECTIVE AND OBJECTIVE BOX
HPI:  82 M presents with non-functioning J tube and SOB. 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 Dyer and Dr. Christian Weinstein on 8/14/17. After surgery, patient was found to have an anastomotic leak and was treated conservatively with NPO, antibiotics, chest tube drainage, and J tube feeding. Patient was eventually discharged to rehab. On day of evaluation, patient's J tube was unable to be used, and was sent to the ED for evaluation. Patient also complains of SOB, but no chest pain or abdominal pain. Denies fever or chills. Patient was taking medications by mouth, but was only getting nutrition via his J tube.      PAST MEDICAL & SURGICAL HISTORY:  Coronary artery disease: s/p 3 stents on June 30, 2017 at [facility] Dr. Lavelle Reid  OA (osteoarthritis) of knee: right  Former smoker, stopped smoking in distant past  Rheumatoid arthritis  Seizure disorder: 1 episode approximately 15 yrs ago  Asthma  Hyperlipidemia  BPH (benign prostatic hyperplasia)  HTN (hypertension)  Esophagus cancer  Chronic allergic rhinitis  BCC (basal cell carcinoma): skin  Cerebrovascular accident (CVA)  Anxiety  History of tonsillectomy  H/O heart artery stent: June 30, 2017  Knee arthropathy: left  History of total hip replacement: left  History of hernia repair      Allergies  penicillin (Rash)        ANTIMICROBIALS:      OTHER MEDS:  lactated ringers. 1000 milliLiter(s) IV Continuous <Continuous>  enoxaparin Injectable 40 milliGRAM(s) SubCutaneous daily  finasteride 5 milliGRAM(s) Oral daily  losartan 50 milliGRAM(s) Oral daily  tamsulosin 0.4 milliGRAM(s) Oral at bedtime  phenytoin   Capsule 100 milliGRAM(s) Oral two times a day  simvastatin 40 milliGRAM(s) Oral at bedtime  pyridoxine 100 milliGRAM(s) Oral daily  folic acid 0.4 milliGRAM(s) Oral daily  cyanocobalamin 500 MICROGram(s) Oral daily  montelukast 10 milliGRAM(s) Oral daily  docusate sodium 100 milliGRAM(s) Oral two times a day  amLODIPine   Tablet 5 milliGRAM(s) Oral daily  ALBUTerol    90 MICROgram(s) HFA Inhaler 2 Puff(s) Inhalation every 6 hours PRN  ALBUTerol/ipratropium for Nebulization 3 milliLiter(s) Nebulizer every 6 hours      SOCIAL HISTORY: [ ] etoh [ ] tobacco [ ] former smoker [ ] IVDU    FAMILY HISTORY:  Family history of heart attack (Sibling)  Family history of pulmonary embolism: father @ 49 yr old  FH: CAD (coronary artery disease)      REVIEW OF SYSTEMS  [  ] ROS unobtainable because:    [  x] All other systems negative except as noted below:	    Constitutional:  [ ] fever [ ] weight loss  Skin:  [ ] rash [ ] phlebitis	  Eyes: [ ] icterus [ ] inflammation	  ENMT: [ ] discharge [ ] thrush [ ] ulcers [ ] exudates  Respiratory: [x ] dyspnea [ ] hemoptysis [ ] cough [ ] sputum	  Cardiovascular:  [ ] chest pain [ ] palpitations [ ] edema	  Gastrointestinal:  [ ] nausea [ ] vomiting [ ] diarrhea [ ] constipation [ ] pain	  Genitourinary:  [ ] dysuria [ ] frequency [ ] hematuria [ ] discharge [ ] flank pain  Musculoskeletal:  [ ] myalgias [ ] arthralgias [ ] arthritis	  Neurological:  [ ] headache [ ] seizures	  Psychiatric:  [ ] anxiety [ ] depression	  Hematology/Lymphatics:  [ ] lymphadenopathy  Endocrine:  [ ] adrenal [ ] thyroid  Allergic/Immunologic:	 [ ] transplant [ ] seasonal    Vital Signs Last 24 Hrs  T(F): 97.8 (09-15-17 @ 16:00), Max: 98.3 (09-14-17 @ 21:59)    Vital Signs Last 24 Hrs  HR: 96 (09-15-17 @ 16:15) (78 - 102)  BP: 131/64 (09-15-17 @ 16:15) (85/50 - 134/64)  RR: 38 (09-15-17 @ 16:15)  SpO2: 98% (09-15-17 @ 16:15) (95% - 100%)  Wt(kg): --    PHYSICAL EXAM:     General: on nasal cannula   HEAD/EYES: anicteric, PERRL  ENT:  supple  Cardiovascular:  decreased breath sounds, right chest tube   Respiratory:  clear bilaterally  GI:  soft, non-tender, normal bowel sounds, scar was intact   :  no CVA tenderness   Musculoskeletal:  no synovitis  Neurologic:  grossly non-focal  Skin: Lymph: no lymphadenopathy  Psychiatric:  appropriate affect  Vascular:  no phlebitis                                    8.7    13.7  )-----------( 358      ( 15 Sep 2017 14:54 )             26.1       09-15    143  |  105  |  42<H>  ----------------------------<  85  4.6   |  25  |  0.83    Ca    9.1      15 Sep 2017 05:56    TPro  7.0  /  Alb  2.3<L>  /  TBili  0.5  /  DBili  x   /  AST  35  /  ALT  36  /  AlkPhos  215<H>  09-14      Urinalysis Basic - ( 15 Sep 2017 05:59 )    Color: Yellow / Appearance: Clear / SG: >1.030 / pH: x  Gluc: x / Ketone: Negative  / Bili: Negative / Urobili: 2   Blood: x / Protein: 30 mg/dL / Nitrite: Negative   Leuk Esterase: Negative / RBC: x / WBC 0-2 /HPF   Sq Epi: x / Non Sq Epi: x / Bacteria: x        MICROBIOLOGY: awaiting bcx        RADIOLOGY: < from: CT Angio Chest w/ IV Cont (09.15.17 @ 00:51) >  UNGS AND LARGE AIRWAYS: Patent central airways. Bilateral passive   atelectasis with partial collapse of left lower lobe. Emphysema.  PLEURA: Moderate/large left pleural effusion, increased in size since   8/26/2017. Unchanged small/moderate, loculated pleural effusion.   Right-sided chest tube is in place, unchanged in position    < end of copied text >  < from: CT Angio Chest w/ IV Cont (09.15.17 @ 00:51) >  MEDIASTINUM AND LAURA: No lymphadenopathy. Patient is status post   esophagectomy with gastric pull-through. Fluid and debris is present   within the intrathoracic stomach.  CHEST WALL AND LOWER NECK: Mild subcutaneous edema..    < end of copied text > HPI:  82 M presents with non-functioning J tube and SOB. 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 Dyer and Dr. Christian Weinstein on 8/14/17. After surgery, patient was found to have an anastomotic leak and was treated conservatively with NPO, antibiotics, chest tube drainage, and J tube feeding. Patient was eventually discharged to rehab. On day of evaluation, patient's J tube was unable to be used, and was sent to the ED for evaluation. Patient also complains of SOB, but no chest pain or abdominal pain. Denies fever or chills. Patient was taking medications by mouth, but was only getting nutrition via his J tube.      PAST MEDICAL & SURGICAL HISTORY:  Coronary artery disease: s/p 3 stents on June 30, 2017 at [place] Dr. Lavelle Reid  OA (osteoarthritis) of knee: right  Former smoker, stopped smoking in distant past  Rheumatoid arthritis  Seizure disorder: 1 episode approximately 15 yrs ago  Asthma  Hyperlipidemia  BPH (benign prostatic hyperplasia)  HTN (hypertension)  Esophagus cancer  Chronic allergic rhinitis  BCC (basal cell carcinoma): skin  Cerebrovascular accident (CVA)  Anxiety  History of tonsillectomy  H/O heart artery stent: June 30, 2017  Knee arthropathy: left  History of total hip replacement: left  History of hernia repair      Allergies  penicillin (Rash)        ANTIMICROBIALS:      OTHER MEDS:  lactated ringers. 1000 milliLiter(s) IV Continuous <Continuous>  enoxaparin Injectable 40 milliGRAM(s) SubCutaneous daily  finasteride 5 milliGRAM(s) Oral daily  losartan 50 milliGRAM(s) Oral daily  tamsulosin 0.4 milliGRAM(s) Oral at bedtime  phenytoin   Capsule 100 milliGRAM(s) Oral two times a day  simvastatin 40 milliGRAM(s) Oral at bedtime  pyridoxine 100 milliGRAM(s) Oral daily  folic acid 0.4 milliGRAM(s) Oral daily  cyanocobalamin 500 MICROGram(s) Oral daily  montelukast 10 milliGRAM(s) Oral daily  docusate sodium 100 milliGRAM(s) Oral two times a day  amLODIPine   Tablet 5 milliGRAM(s) Oral daily  ALBUTerol    90 MICROgram(s) HFA Inhaler 2 Puff(s) Inhalation every 6 hours PRN  ALBUTerol/ipratropium for Nebulization 3 milliLiter(s) Nebulizer every 6 hours      SOCIAL HISTORY: [ ] etoh [ ] tobacco [ ] former smoker [ ] IVDU    FAMILY HISTORY:  Family history of heart attack (Sibling)  Family history of pulmonary embolism: father @ 49 yr old  FH: CAD (coronary artery disease)      REVIEW OF SYSTEMS  [  ] ROS unobtainable because:    [  x] All other systems negative except as noted below:	    Constitutional:  [ ] fever [ ] weight loss  Skin:  [ ] rash [ ] phlebitis	  Eyes: [ ] icterus [ ] inflammation	  ENMT: [ ] discharge [ ] thrush [ ] ulcers [ ] exudates  Respiratory: [x ] dyspnea [ ] hemoptysis [ ] cough [ ] sputum	  Cardiovascular:  [ ] chest pain [ ] palpitations [ ] edema	  Gastrointestinal:  [ ] nausea [ ] vomiting [ ] diarrhea [ ] constipation [ ] pain	  Genitourinary:  [ ] dysuria [ ] frequency [ ] hematuria [ ] discharge [ ] flank pain  Musculoskeletal:  [ ] myalgias [ ] arthralgias [ ] arthritis	  Neurological:  [ ] headache [ ] seizures	  Psychiatric:  [ ] anxiety [ ] depression	  Hematology/Lymphatics:  [ ] lymphadenopathy  Endocrine:  [ ] adrenal [ ] thyroid  Allergic/Immunologic:	 [ ] transplant [ ] seasonal    Vital Signs Last 24 Hrs  T(F): 97.8 (09-15-17 @ 16:00), Max: 98.3 (09-14-17 @ 21:59)    Vital Signs Last 24 Hrs  HR: 96 (09-15-17 @ 16:15) (78 - 102)  BP: 131/64 (09-15-17 @ 16:15) (85/50 - 134/64)  RR: 38 (09-15-17 @ 16:15)  SpO2: 98% (09-15-17 @ 16:15) (95% - 100%)  Wt(kg): --    PHYSICAL EXAM:     General: on nasal cannula   HEAD/EYES: anicteric, PERRL  ENT:  supple  Cardiovascular:   S1,S 2+  Respiratory:  clear bilaterally, decreased breath sounds, right chest tube  GI:  soft, non-tender, normal bowel sounds, scar was intact   :  no CVA tenderness   Musculoskeletal:  no synovitis  Neurologic:  grossly non-focal  Skin: Lymph: no lymphadenopathy  Psychiatric:  appropriate affect  Vascular:  no phlebitis                                    8.7    13.7  )-----------( 358      ( 15 Sep 2017 14:54 )             26.1       09-15    143  |  105  |  42<H>  ----------------------------<  85  4.6   |  25  |  0.83    Ca    9.1      15 Sep 2017 05:56    TPro  7.0  /  Alb  2.3<L>  /  TBili  0.5  /  DBili  x   /  AST  35  /  ALT  36  /  AlkPhos  215<H>  09-14      Urinalysis Basic - ( 15 Sep 2017 05:59 )    Color: Yellow / Appearance: Clear / SG: >1.030 / pH: x  Gluc: x / Ketone: Negative  / Bili: Negative / Urobili: 2   Blood: x / Protein: 30 mg/dL / Nitrite: Negative   Leuk Esterase: Negative / RBC: x / WBC 0-2 /HPF   Sq Epi: x / Non Sq Epi: x / Bacteria: x        MICROBIOLOGY: awaiting bcx        RADIOLOGY: < from: CT Angio Chest w/ IV Cont (09.15.17 @ 00:51) >  UNGS AND LARGE AIRWAYS: Patent central airways. Bilateral passive   atelectasis with partial collapse of left lower lobe. Emphysema.  PLEURA: Moderate/large left pleural effusion, increased in size since   8/26/2017. Unchanged small/moderate, loculated pleural effusion.   Right-sided chest tube is in place, unchanged in position    < end of copied text >  < from: CT Angio Chest w/ IV Cont (09.15.17 @ 00:51) >  MEDIASTINUM AND LAURA: No lymphadenopathy. Patient is status post   esophagectomy with gastric pull-through. Fluid and debris is present   within the intrathoracic stomach.  CHEST WALL AND LOWER NECK: Mild subcutaneous edema..    < end of copied text >

## 2017-09-15 NOTE — ED ADULT NURSE REASSESSMENT NOTE - NS ED NURSE REASSESS COMMENT FT1
surgery came and saw pt. feeding tube flushed and dressing placed. Pt. reports the 22 g in his left arm has been in for 5 days from [facility]. 22 g d/c. Pt. has 18 g in left forearm from tonight's visit. Pt. denies any current SOB

## 2017-09-15 NOTE — ED ADULT NURSE REASSESSMENT NOTE - NS ED NURSE REASSESS COMMENT FT1
Pt. denies SOB at this time. GINA drain noted to right side, scant amount of yellow drainage noted. Feeding tube noted to mid-abdomen, dressing to site is clean dry and intact.

## 2017-09-15 NOTE — CONSULT NOTE ADULT - PROBLEM SELECTOR RECOMMENDATION 2
plan for thoracentesis at bedside plan for drainage of L pleural effusion  would send pleural fluid for analysis, culture, cell count, cytology

## 2017-09-15 NOTE — PROCEDURE NOTE - SUPERVISORY STATEMENT
performed U/S guided left pleural drainage with 8Fr pericardiocentesis catheter. printed U/S placed in paper chart.

## 2017-09-15 NOTE — CONSULT NOTE ADULT - SUBJECTIVE AND OBJECTIVE BOX
PULMONARY CONSULT    Initial HPI on admission:  HPI:  82 M presents with non-functioning J tube and SOB.  Pt diagnosed with esophageal adenocarcinoma s/p tx with chemo and radiation therapy. 8/14/17 Patient  underwent a robotic-assisted [place] esophagogastrectomy with feeding J tube placement for esophageal adenocarcinoma (T1aN0) with Dr. Dyer and Dr. Christian Weinstein on 8/14/17. After surgery, patient was found to have an anastomotic leak and was treated conservatively with NPO, antibiotics, R chest tube drainage, and J tube feeding. Patient was eventually discharged to rehab. On day of evaluation, patient's J tube was unable to be used, and was sent to the ED for evaluation. Patient also complains of SOB, but no chest pain or abdominal pain.  Right CT reported to be draining murky brown fluid, with elsy bulb reinflating, Ct chest was obtained with increase in size of Left pleural effusion. Pt seen in ER anxious, dyspneic on 100% nrb, also with wbc 16k.  Called to eval + anxiety, + sob, -cough, -cp      BRIEF HOSPITAL COURSE: ***    PAST MEDICAL & SURGICAL HISTORY:  Coronary artery disease: s/p 3 stents on June 30, 2017 at [facility] Dr. Lavelle Reid  OA (osteoarthritis) of knee: right  Former smoker, stopped smoking in distant past  Rheumatoid arthritis  Seizure disorder: 1 episode approximately 15 yrs ago  Asthma  Hyperlipidemia  BPH (benign prostatic hyperplasia)  HTN (hypertension)  Esophagus cancer  Chronic allergic rhinitis  BCC (basal cell carcinoma): skin  Cerebrovascular accident (CVA)  Anxiety  History of tonsillectomy  H/O heart artery stent: June 30, 2017  Knee arthropathy: left  History of total hip replacement: left  History of hernia repair    Allergies    penicillin (Rash)    Intolerances      FAMILY HISTORY:  Family history of heart attack (Sibling)  Family history of pulmonary embolism: father @ 49 yr old  FH: CAD (coronary artery disease)    Social history:     Review of Systems:  CONSTITUTIONAL: No fever, chills, or fatigue  EYES: No eye pain, visual disturbances, or discharge  ENMT:  No difficulty hearing, tinnitus, vertigo; No sinus or throat pain  NECK: No pain or stiffness  RESPIRATORY: Per above  CARDIOVASCULAR: No chest pain, palpitations, dizziness, or leg swelling  GASTROINTESTINAL: No abdominal or epigastric pain. No nausea, vomiting, or hematemesis; No diarrhea or constipation. No melena or hematochezia.  GENITOURINARY: No dysuria, frequency, hematuria, or incontinence  NEUROLOGICAL: No headaches, memory loss, loss of strength, numbness, or tremors  SKIN: No itching, burning, rashes, or lesions   MUSCULOSKELETAL: No joint pain or swelling; No muscle, back, or extremity pain  PSYCHIATRIC: No depression, anxiety, mood swings, or difficulty sleeping      Medications:  MEDICATIONS  (STANDING):  lactated ringers. 1000 milliLiter(s) (75 mL/Hr) IV Continuous <Continuous>  enoxaparin Injectable 40 milliGRAM(s) SubCutaneous daily  finasteride 5 milliGRAM(s) Oral daily  losartan 50 milliGRAM(s) Oral daily  tamsulosin 0.4 milliGRAM(s) Oral at bedtime  phenytoin   Capsule 100 milliGRAM(s) Oral two times a day  simvastatin 40 milliGRAM(s) Oral at bedtime  pyridoxine 100 milliGRAM(s) Oral daily  folic acid 0.4 milliGRAM(s) Oral daily  cyanocobalamin 500 MICROGram(s) Oral daily  montelukast 10 milliGRAM(s) Oral daily  docusate sodium 100 milliGRAM(s) Oral two times a day  amLODIPine   Tablet 5 milliGRAM(s) Oral daily    MEDICATIONS  (PRN):  ALBUTerol    90 MICROgram(s) HFA Inhaler 2 Puff(s) Inhalation every 6 hours PRN Shortness of Breath and/or Wheezing            Vital Signs Last 24 Hrs  T(C): 36.7 (15 Sep 2017 10:57), Max: 36.8 (14 Sep 2017 21:59)  T(F): 98 (15 Sep 2017 10:57), Max: 98.3 (14 Sep 2017 21:59)  HR: 97 (15 Sep 2017 10:57) (78 - 102)  BP: 116/75 (15 Sep 2017 10:57) (85/50 - 116/75)  BP(mean): --  RR: 18 (15 Sep 2017 10:57) (18 - 18)  SpO2: 98% (15 Sep 2017 10:57) (95% - 99%)      VBG pH 7.41 09-15 @ 01:02  VBG pCO2 43 09-15 @ 01:02  VBG O2 sat 36 09-15 @ 01:02  VBG lactate 0.7 09-15 @ 01:02            LABS:                        8.2    15.3  )-----------( 362      ( 15 Sep 2017 05:56 )             25.2     09-15    143  |  105  |  42<H>  ----------------------------<  85  4.6   |  25  |  0.83    Ca    9.1      15 Sep 2017 05:56    TPro  7.0  /  Alb  2.3<L>  /  TBili  0.5  /  DBili  x   /  AST  35  /  ALT  36  /  AlkPhos  215<H>  09-14          CAPILLARY BLOOD GLUCOSE          Urinalysis Basic - ( 15 Sep 2017 05:59 )    Color: Yellow / Appearance: Clear / SG: >1.030 / pH: x  Gluc: x / Ketone: Negative  / Bili: Negative / Urobili: 2   Blood: x / Protein: 30 mg/dL / Nitrite: Negative   Leuk Esterase: Negative / RBC: x / WBC 0-2 /HPF   Sq Epi: x / Non Sq Epi: x / Bacteria: x                    CULTURES: (if applicable)        Physical Examination:    General: No acute distress.      HEENT: Pupils equal, reactive to light.  Symmetric.    PULM: Clear to auscultation bilaterally, no significant sputum production    CVS: S1, S2    ABD: Soft, nondistended, nontender, normoactive bowel sounds, no masses    EXT: No edema, nontender    SKIN: Warm and well perfused, no rashes noted.    NEURO: Alert, oriented, interactive, nonfocal    RADIOLOGY REVIEWED  CXR:    CT chest:    TTE: PULMONARY CONSULT    Initial HPI on admission:  HPI:  82 M presents with non-functioning J tube and SOB.  Pt diagnosed with esophageal adenocarcinoma s/p tx with chemo and radiation therapy. 8/14/17 Patient  underwent a robotic-assisted [place] esophagogastrectomy with feeding J tube placement for esophageal adenocarcinoma (T1aN0) with Dr. Dyer and Dr. Christian Weinstein. After surgery, patient was found to have an anastomotic leak and was treated conservatively with NPO, antibiotics, R chest tube placement-eventually transitioned to suction bulb, and J tube feeding. Patient was eventually discharged to rehab. On day of evaluation, patient's J tube was unable to be used, and was sent to the ED for evaluation. Patient also complains of SOB, but no chest pain or abdominal pain.  Right CT reported to be draining murky brown fluid, with elsy bulb not inflating, Ct chest was obtained with increase in size of Left pleural effusion. Pt seen in ER anxious, dyspneic on 100% nrb, also with wbc 16k.  Called to eval + anxiety, + sob, -cough, -cp, -pleurtic cp, - fever, -n/v/d, neg leg pain-reported that pt had increased oxygen requirements and now on 100% NRB. I briefly switched pt to 5l nc, pt became acutely dyspneic - then switched back to 100% nrb with o2 sat 99%    PAST MEDICAL & SURGICAL HISTORY:  Coronary artery disease: s/p 3 stents on June 30, 2017 at [facility] Dr. Lavelle Reid  OA (osteoarthritis) of knee: right  Former smoker, stopped smoking in distant past  Rheumatoid arthritis  Seizure disorder: 1 episode approximately 15 yrs ago  Asthma  centrilobar emphysema  Hyperlipidemia  BPH (benign prostatic hyperplasia)  HTN (hypertension)  Esophagus cancer  Chronic allergic rhinitis  BCC (basal cell carcinoma): skin  Cerebrovascular accident (CVA)  Anxiety  History of tonsillectomy  H/O heart artery stent: June 30, 2017  Knee arthropathy: left  History of total hip replacement: left  History of hernia repair    Allergies    penicillin (Rash)      FAMILY HISTORY:  Family history of heart attack (Sibling)  Family history of pulmonary embolism: father @ 49 yr old  FH: CAD (coronary artery disease)    Social history: former heavy smoker, quit 50 yrs ago, s/p 2ppd x 20yrs    Review of Systems:  CONSTITUTIONAL: No fever, chills, or fatigue  EYES: No eye pain, visual disturbances, or discharge  ENMT:  No difficulty hearing, tinnitus, vertigo; No sinus or throat pain  NECK: No pain or stiffness  RESPIRATORY: Per above  CARDIOVASCULAR: No chest pain, palpitations, dizziness, or leg swelling  GASTROINTESTINAL: No abdominal or epigastric pain. No nausea, vomiting, or hematemesis; No diarrhea or constipation. No melena or hematochezia.  GENITOURINARY: No dysuria, frequency, hematuria, or incontinence  NEUROLOGICAL: No headaches, memory loss, loss of strength, numbness, or tremors  SKIN: No itching, burning, rashes, or lesions   MUSCULOSKELETAL: No joint pain or swelling; No muscle, back, or extremity pain  PSYCHIATRIC: No depression, mood swings, or difficulty sleeping, + anxiety      Medications:  MEDICATIONS  (STANDING):  lactated ringers. 1000 milliLiter(s) (75 mL/Hr) IV Continuous <Continuous>  enoxaparin Injectable 40 milliGRAM(s) SubCutaneous daily  finasteride 5 milliGRAM(s) Oral daily  losartan 50 milliGRAM(s) Oral daily  tamsulosin 0.4 milliGRAM(s) Oral at bedtime  phenytoin   Capsule 100 milliGRAM(s) Oral two times a day  simvastatin 40 milliGRAM(s) Oral at bedtime  pyridoxine 100 milliGRAM(s) Oral daily  folic acid 0.4 milliGRAM(s) Oral daily  cyanocobalamin 500 MICROGram(s) Oral daily  montelukast 10 milliGRAM(s) Oral daily  docusate sodium 100 milliGRAM(s) Oral two times a day  amLODIPine   Tablet 5 milliGRAM(s) Oral daily    MEDICATIONS  (PRN):  ALBUTerol    90 MICROgram(s) HFA Inhaler 2 Puff(s) Inhalation every 6 hours PRN Shortness of Breath and/or Wheezing            Vital Signs Last 24 Hrs  T(C): 36.7 (15 Sep 2017 10:57), Max: 36.8 (14 Sep 2017 21:59)  T(F): 98 (15 Sep 2017 10:57), Max: 98.3 (14 Sep 2017 21:59)  HR: 97 (15 Sep 2017 10:57) (78 - 102)  BP: 116/75 (15 Sep 2017 10:57) (85/50 - 116/75)  BP(mean): --  RR: 18 (15 Sep 2017 10:57) (18 - 18)  SpO2: 98% (15 Sep 2017 10:57) (95% - 99%)      VBG pH 7.41 09-15 @ 01:02  VBG pCO2 43 09-15 @ 01:02  VBG O2 sat 36 09-15 @ 01:02  VBG lactate 0.7 09-15 @ 01:02            LABS:                        8.2    15.3  )-----------( 362      ( 15 Sep 2017 05:56 )             25.2     09-15    143  |  105  |  42<H>  ----------------------------<  85  4.6   |  25  |  0.83    Ca    9.1      15 Sep 2017 05:56    TPro  7.0  /  Alb  2.3<L>  /  TBili  0.5  /  DBili  x   /  AST  35  /  ALT  36  /  AlkPhos  215<H>  09-14          CAPILLARY BLOOD GLUCOSE    Urinalysis Basic - ( 15 Sep 2017 05:59 )    Color: Yellow / Appearance: Clear / SG: >1.030 / pH: x  Gluc: x / Ketone: Negative  / Bili: Negative / Urobili: 2   Blood: x / Protein: 30 mg/dL / Nitrite: Negative   Leuk Esterase: Negative / RBC: x / WBC 0-2 /HPF   Sq Epi: x / Non Sq Epi: x / Bacteria: x        CULTURES: pending        Physical Examination:  a/o x3, + anxiety, o2 sat 99% on 100% nrb  General: No acute distress. + anxiety     HEENT: Pupils equal, reactive to light.  Symmetric.    PULM: decreased bs bilat L>R, R chest tube to suction bulb, no significant sputum production    CVS: S1, S2    ABD: Soft, nondistended, nontender, normoactive bowel sounds, no masses    EXT: No edema, nontender    SKIN: Warm and well perfused, no rashes noted.    NEURO: Alert, oriented, interactive, nonfocal, a/0 x3 maex4    RADIOLOGY REVIEWED  CXR:    CT chest:< from: CT Angio Chest w/ IV Cont (09.15.17 @ 00:51) >    IMPRESSION:     No pulmonary embolism.    Status post distal esophagectomy with gastric pull-through.   Evaluation   for persistent leak is limited on this examination without contrast.    Moderate/large left pleural effusion, increased in size since 8/26/2017.   Stable small/moderate loculated right pleural effusion with a right-sided   chest tube in place.    Small pericardial effusion is new since 08/26/2017.    Mild subcutaneous edema.      < from: Xray Esophagram (08.21.17 @ 11:39) >  IMPRESSION:      1. Leak in the region of the greater curvature at the level of the   cardioesophageal junction.  2. Contrast is also seen pooling in alinear fashion in the left upper   esophagus, this could represent a contained leak versus redundant   pull-through stomach. PULMONARY CONSULT    Initial HPI on admission:  HPI:  82 M presents with non-functioning J tube and SOB.  Pt diagnosed with esophageal adenocarcinoma s/p tx with chemo and radiation therapy. 8/14/17 Patient  underwent a robotic-assisted [place] esophagogastrectomy with feeding J tube placement for esophageal adenocarcinoma (T1aN0) with Dr. Dyer and Dr. Christian Weinstein. After surgery, patient was found to have an anastomotic leak and was treated conservatively with NPO, jtube feeds, antibiotics, R chest tube placement-eventually transitioned to suction bulb,. Patient was eventually discharged to rehab on 8/27. On day of evaluation, patient's J tube was unable to be used, and was sent to the ED for evaluation. Patient also complains of SOB, but no chest pain or abdominal pain.  Right CT reported to be draining murky brown fluid, with elsy bulb not inflating, Ct chest was obtained with increase in size of Left pleural effusion. Pt seen in ER anxious, dyspneic on 100% nrb, also with wbc 16k.  Called to eval + anxiety, + sob, -cough, -cp, -pleurtic cp, - fever, -n/v/d, neg leg pain-reported that pt had increased oxygen requirements and now on 100% NRB. I briefly switched pt to 5l nc, pt became acutely dyspneic - then switched back to 100% nrb with o2 sat 99%    PAST MEDICAL & SURGICAL HISTORY:  Coronary artery disease: s/p 3 stents on June 30, 2017 at [facility] Dr. Lavelle Reid  OA (osteoarthritis) of knee: right  Former smoker, stopped smoking in distant past  Rheumatoid arthritis  Seizure disorder: 1 episode approximately 15 yrs ago  Asthma  centrilobar emphysema  Hyperlipidemia  BPH (benign prostatic hyperplasia)  HTN (hypertension)  Esophagus cancer  Chronic allergic rhinitis  BCC (basal cell carcinoma): skin  Cerebrovascular accident (CVA)  Anxiety  History of tonsillectomy  H/O heart artery stent: June 30, 2017  Knee arthropathy: left  History of total hip replacement: left  History of hernia repair    Allergies    penicillin (Rash)      FAMILY HISTORY:  Family history of heart attack (Sibling)  Family history of pulmonary embolism: father @ 49 yr old  FH: CAD (coronary artery disease)    Social history: former heavy smoker, quit 50 yrs ago, s/p 2ppd x 20yrs    Review of Systems:  CONSTITUTIONAL: No fever, chills, or fatigue  EYES: No eye pain, visual disturbances, or discharge  ENMT:  No difficulty hearing, tinnitus, vertigo; No sinus or throat pain  NECK: No pain or stiffness  RESPIRATORY: Per above  CARDIOVASCULAR: No chest pain, palpitations, dizziness, or leg swelling  GASTROINTESTINAL: No abdominal or epigastric pain. No nausea, vomiting, or hematemesis; No diarrhea or constipation. No melena or hematochezia.  GENITOURINARY: No dysuria, frequency, hematuria, or incontinence  NEUROLOGICAL: No headaches, memory loss, loss of strength, numbness, or tremors  SKIN: No itching, burning, rashes, or lesions   MUSCULOSKELETAL: No joint pain or swelling; No muscle, back, or extremity pain  PSYCHIATRIC: No depression, mood swings, or difficulty sleeping, + anxiety      Medications:  MEDICATIONS  (STANDING):  lactated ringers. 1000 milliLiter(s) (75 mL/Hr) IV Continuous <Continuous>  enoxaparin Injectable 40 milliGRAM(s) SubCutaneous daily  finasteride 5 milliGRAM(s) Oral daily  losartan 50 milliGRAM(s) Oral daily  tamsulosin 0.4 milliGRAM(s) Oral at bedtime  phenytoin   Capsule 100 milliGRAM(s) Oral two times a day  simvastatin 40 milliGRAM(s) Oral at bedtime  pyridoxine 100 milliGRAM(s) Oral daily  folic acid 0.4 milliGRAM(s) Oral daily  cyanocobalamin 500 MICROGram(s) Oral daily  montelukast 10 milliGRAM(s) Oral daily  docusate sodium 100 milliGRAM(s) Oral two times a day  amLODIPine   Tablet 5 milliGRAM(s) Oral daily    MEDICATIONS  (PRN):  ALBUTerol    90 MICROgram(s) HFA Inhaler 2 Puff(s) Inhalation every 6 hours PRN Shortness of Breath and/or Wheezing            Vital Signs Last 24 Hrs  T(C): 36.7 (15 Sep 2017 10:57), Max: 36.8 (14 Sep 2017 21:59)  T(F): 98 (15 Sep 2017 10:57), Max: 98.3 (14 Sep 2017 21:59)  HR: 97 (15 Sep 2017 10:57) (78 - 102)  BP: 116/75 (15 Sep 2017 10:57) (85/50 - 116/75)  BP(mean): --  RR: 18 (15 Sep 2017 10:57) (18 - 18)  SpO2: 98% (15 Sep 2017 10:57) (95% - 99%)      VBG pH 7.41 09-15 @ 01:02  VBG pCO2 43 09-15 @ 01:02  VBG O2 sat 36 09-15 @ 01:02  VBG lactate 0.7 09-15 @ 01:02            LABS:                        8.2    15.3  )-----------( 362      ( 15 Sep 2017 05:56 )             25.2     09-15    143  |  105  |  42<H>  ----------------------------<  85  4.6   |  25  |  0.83    Ca    9.1      15 Sep 2017 05:56    TPro  7.0  /  Alb  2.3<L>  /  TBili  0.5  /  DBili  x   /  AST  35  /  ALT  36  /  AlkPhos  215<H>  09-14          CAPILLARY BLOOD GLUCOSE    Urinalysis Basic - ( 15 Sep 2017 05:59 )    Color: Yellow / Appearance: Clear / SG: >1.030 / pH: x  Gluc: x / Ketone: Negative  / Bili: Negative / Urobili: 2   Blood: x / Protein: 30 mg/dL / Nitrite: Negative   Leuk Esterase: Negative / RBC: x / WBC 0-2 /HPF   Sq Epi: x / Non Sq Epi: x / Bacteria: x        CULTURES: pending        Physical Examination:  a/o x3, + anxiety, o2 sat 99% on 100% nrb  General: No acute distress. + anxiety     HEENT: Pupils equal, reactive to light.  Symmetric.    PULM: decreased bs bilat L>R, R chest tube to suction bulb, no significant sputum production    CVS: S1, S2    ABD: Soft, nondistended, nontender, normoactive bowel sounds, no masses    EXT: No edema, nontender    SKIN: Warm and well perfused, no rashes noted.    NEURO: Alert, oriented, interactive, nonfocal, a/0 x3 maex4    RADIOLOGY REVIEWED  CXR: Pending    CT chest:< from: CT Angio Chest w/ IV Cont (09.15.17 @ 00:51) >    IMPRESSION:     No pulmonary embolism.    Status post distal esophagectomy with gastric pull-through.   Evaluation   for persistent leak is limited on this examination without contrast.    Moderate/large left pleural effusion, increased in size since 8/26/2017.   Stable small/moderate loculated right pleural effusion with a right-sided   chest tube in place.    Small pericardial effusion is new since 08/26/2017.    Mild subcutaneous edema.      < from: Xray Esophagram (08.21.17 @ 11:39) >  IMPRESSION:      1. Leak in the region of the greater curvature at the level of the   cardioesophageal junction.  2. Contrast is also seen pooling in alinear fashion in the left upper   esophagus, this could represent a contained leak versus redundant   pull-through stomach. PULMONARY CONSULT    Initial HPI on admission:  HPI:  82 M presents with non-functioning J tube and SOB.  Pt diagnosed with esophageal adenocarcinoma s/p tx with chemo and radiation therapy. 8/14/17 Patient  underwent a robotic-assisted [place] esophagogastrectomy with feeding J tube placement for esophageal adenocarcinoma (T1aN0) with Dr. Dyer and Dr. Christian Weinstein. After surgery, patient was found to have an anastomotic leak and was treated conservatively with NPO, jtube feeds, antibiotics, R chest tube placement-eventually transitioned to suction bulb,. Patient was eventually discharged to rehab on 8/27. On day of evaluation, patient's J tube was unable to be used, and was sent to the ED for evaluation. Patient also complains of SOB, but no chest pain or abdominal pain.  Right CT reported to be draining murky brown fluid, with elsy bulb not inflating, Ct chest was obtained with increase in size of Left pleural effusion. Pt seen in ER anxious, dyspneic on 100% nrb, also with wbc 16k.  Called to eval + anxiety, + sob, -cough, -cp, -pleurtic cp, - fever, -n/v/d, neg leg pain-reported that pt had increased oxygen requirements and now on 100% NRB. I briefly switched pt to 5l nc, pt became acutely dyspneic - then switched back to 100% nrb with o2 sat 99%  In ER recieved abx cefepime, vanco, flagyl. IVF LR @ 75cc/hr, on 100% NRB -02 sat 99%    PAST MEDICAL & SURGICAL HISTORY:  Coronary artery disease: s/p 3 stents on June 30, 2017 at [place] Dr. Lavelle Reid  OA (osteoarthritis) of knee: right  Former smoker, stopped smoking in distant past  Rheumatoid arthritis  Seizure disorder: 1 episode approximately 15 yrs ago  Asthma  centrilobar emphysema  Hyperlipidemia  BPH (benign prostatic hyperplasia)  HTN (hypertension)  Esophagus cancer  Chronic allergic rhinitis  BCC (basal cell carcinoma): skin  Cerebrovascular accident (CVA)  Anxiety  History of tonsillectomy  H/O heart artery stent: June 30, 2017  Knee arthropathy: left  History of total hip replacement: left  History of hernia repair    Allergies    penicillin (Rash)      FAMILY HISTORY:  Family history of heart attack (Sibling)  Family history of pulmonary embolism: father @ 49 yr old  FH: CAD (coronary artery disease)    Social history: former heavy smoker, quit 50 yrs ago, s/p 2ppd x 20yrs    Review of Systems:  CONSTITUTIONAL: No fever, chills, or fatigue  EYES: No eye pain, visual disturbances, or discharge  ENMT:  No difficulty hearing, tinnitus, vertigo; No sinus or throat pain  NECK: No pain or stiffness  RESPIRATORY: Per above  CARDIOVASCULAR: No chest pain, palpitations, dizziness, or leg swelling  GASTROINTESTINAL: No abdominal or epigastric pain. No nausea, vomiting, or hematemesis; No diarrhea or constipation. No melena or hematochezia.  GENITOURINARY: No dysuria, frequency, hematuria, or incontinence  NEUROLOGICAL: No headaches, memory loss, loss of strength, numbness, or tremors  SKIN: No itching, burning, rashes, or lesions   MUSCULOSKELETAL: No joint pain or swelling; No muscle, back, or extremity pain  PSYCHIATRIC: No depression, mood swings, or difficulty sleeping, + anxiety      Medications:  MEDICATIONS  (STANDING):  lactated ringers. 1000 milliLiter(s) (75 mL/Hr) IV Continuous <Continuous>  enoxaparin Injectable 40 milliGRAM(s) SubCutaneous daily  finasteride 5 milliGRAM(s) Oral daily  losartan 50 milliGRAM(s) Oral daily  tamsulosin 0.4 milliGRAM(s) Oral at bedtime  phenytoin   Capsule 100 milliGRAM(s) Oral two times a day  simvastatin 40 milliGRAM(s) Oral at bedtime  pyridoxine 100 milliGRAM(s) Oral daily  folic acid 0.4 milliGRAM(s) Oral daily  cyanocobalamin 500 MICROGram(s) Oral daily  montelukast 10 milliGRAM(s) Oral daily  docusate sodium 100 milliGRAM(s) Oral two times a day  amLODIPine   Tablet 5 milliGRAM(s) Oral daily    MEDICATIONS  (PRN):  ALBUTerol    90 MICROgram(s) HFA Inhaler 2 Puff(s) Inhalation every 6 hours PRN Shortness of Breath and/or Wheezing            Vital Signs Last 24 Hrs  T(C): 36.7 (15 Sep 2017 10:57), Max: 36.8 (14 Sep 2017 21:59)  T(F): 98 (15 Sep 2017 10:57), Max: 98.3 (14 Sep 2017 21:59)  HR: 97 (15 Sep 2017 10:57) (78 - 102)  BP: 116/75 (15 Sep 2017 10:57) (85/50 - 116/75)  BP(mean): --  RR: 18 (15 Sep 2017 10:57) (18 - 18)  SpO2: 98% (15 Sep 2017 10:57) (95% - 99%)      VBG pH 7.41 09-15 @ 01:02  VBG pCO2 43 09-15 @ 01:02  VBG O2 sat 36 09-15 @ 01:02  VBG lactate 0.7 09-15 @ 01:02            LABS:                        8.2    15.3  )-----------( 362      ( 15 Sep 2017 05:56 )             25.2     09-15    143  |  105  |  42<H>  ----------------------------<  85  4.6   |  25  |  0.83    Ca    9.1      15 Sep 2017 05:56    TPro  7.0  /  Alb  2.3<L>  /  TBili  0.5  /  DBili  x   /  AST  35  /  ALT  36  /  AlkPhos  215<H>  09-14          CAPILLARY BLOOD GLUCOSE    Urinalysis Basic - ( 15 Sep 2017 05:59 )    Color: Yellow / Appearance: Clear / SG: >1.030 / pH: x  Gluc: x / Ketone: Negative  / Bili: Negative / Urobili: 2   Blood: x / Protein: 30 mg/dL / Nitrite: Negative   Leuk Esterase: Negative / RBC: x / WBC 0-2 /HPF   Sq Epi: x / Non Sq Epi: x / Bacteria: x        CULTURES: pending        Physical Examination:  a/o x3, + anxiety, o2 sat 99% on 100% nrb  General: No acute distress. + anxiety     HEENT: Pupils equal, reactive to light.  Symmetric.    PULM: decreased bs bilat L>R, R chest tube to suction bulb, no significant sputum production    CVS: S1, S2    ABD: Soft, nondistended, nontender, normoactive bowel sounds, no masses    EXT: No edema, nontender    SKIN: Warm and well perfused, no rashes noted.    NEURO: Alert, oriented, interactive, nonfocal, a/0 x3 maex4    RADIOLOGY REVIEWED  CXR: Pending    CT chest:< from: CT Angio Chest w/ IV Cont (09.15.17 @ 00:51) >    IMPRESSION:     No pulmonary embolism.    Status post distal esophagectomy with gastric pull-through.   Evaluation   for persistent leak is limited on this examination without contrast.    Moderate/large left pleural effusion, increased in size since 8/26/2017.   Stable small/moderate loculated right pleural effusion with a right-sided   chest tube in place.    Small pericardial effusion is new since 08/26/2017.    Mild subcutaneous edema.      < from: Xray Esophagram (08.21.17 @ 11:39) >  IMPRESSION:      1. Leak in the region of the greater curvature at the level of the   cardioesophageal junction.  2. Contrast is also seen pooling in alinear fashion in the left upper   esophagus, this could represent a contained leak versus redundant   pull-through stomach. PULMONARY CONSULT    Initial HPI on admission:  HPI:  82 M presents with non-functioning J tube and SOB.  Pt diagnosed with esophageal adenocarcinoma s/p tx with chemo and radiation therapy. 8/14/17 Patient  underwent a robotic-assisted [place]-Trent esophagogastrectomy with feeding J tube placement for esophageal adenocarcinoma (T1aN0) with Dr. Dyer and Dr. Christian Weinstein. After surgery, patient was found to have an anastomotic leak and was treated conservatively with NPO, jtube feeds, antibiotics, R chest tube placement-eventually transitioned to suction bulb,. Patient was eventually discharged to rehab on 8/27. On day of evaluation, patient's J tube was unable to be used, and was sent to the ED for evaluation. Patient also complains of SOB, but no chest pain or abdominal pain.  Right CT reported to be draining murky brown fluid, with elsy bulb not inflating, Ct chest was obtained with increase in size of Left pleural effusion. Pt seen in ER anxious, dyspneic on 100% nrb, also with wbc 16k.  Called to eval + anxiety, + sob, -cough, -cp, -pleurtic cp, - fever, -n/v/d, neg leg pain-reported that pt had increased oxygen requirements and now on 100% NRB. I briefly switched pt to 5l nc, pt became acutely dyspneic - then switched back to 100% nrb with o2 sat 99%  In ER recieved abx cefepime, vanco, flagyl. IVF LR @ 75cc/hr, on 100% NRB -02 sat 99%    PAST MEDICAL & SURGICAL HISTORY:  Coronary artery disease: s/p 3 stents on June 30, 2017 at [place] Dr. Lavelle Reid  OA (osteoarthritis) of knee: right  Former smoker, stopped smoking in distant past  Rheumatoid arthritis  Seizure disorder: 1 episode approximately 15 yrs ago  Asthma  centrilobar emphysema  Hyperlipidemia  BPH (benign prostatic hyperplasia)  HTN (hypertension)  Esophagus cancer  Chronic allergic rhinitis  BCC (basal cell carcinoma): skin  Cerebrovascular accident (CVA)  Anxiety  History of tonsillectomy  H/O heart artery stent: June 30, 2017  Knee arthropathy: left  History of total hip replacement: left  History of hernia repair    Allergies    penicillin (Rash)      FAMILY HISTORY:  Family history of heart attack (Sibling)  Family history of pulmonary embolism: father @ 49 yr old  FH: CAD (coronary artery disease)    Social history: former heavy smoker, quit 50 yrs ago, s/p 2ppd x 20yrs    Review of Systems:  CONSTITUTIONAL: No fever, chills, or fatigue  EYES: No eye pain, visual disturbances, or discharge  ENMT:  No difficulty hearing, tinnitus, vertigo; No sinus or throat pain  NECK: No pain or stiffness  RESPIRATORY: Per above  CARDIOVASCULAR: No chest pain, palpitations, dizziness, or leg swelling  GASTROINTESTINAL: No abdominal or epigastric pain. No nausea, vomiting, or hematemesis; No diarrhea or constipation. No melena or hematochezia.  GENITOURINARY: No dysuria, frequency, hematuria, or incontinence  NEUROLOGICAL: No headaches, memory loss, loss of strength, numbness, or tremors  SKIN: No itching, burning, rashes, or lesions   MUSCULOSKELETAL: No joint pain or swelling; No muscle, back, or extremity pain  PSYCHIATRIC: No depression, mood swings, or difficulty sleeping, + anxiety      Medications:  MEDICATIONS  (STANDING):  lactated ringers. 1000 milliLiter(s) (75 mL/Hr) IV Continuous <Continuous>  enoxaparin Injectable 40 milliGRAM(s) SubCutaneous daily  finasteride 5 milliGRAM(s) Oral daily  losartan 50 milliGRAM(s) Oral daily  tamsulosin 0.4 milliGRAM(s) Oral at bedtime  phenytoin   Capsule 100 milliGRAM(s) Oral two times a day  simvastatin 40 milliGRAM(s) Oral at bedtime  pyridoxine 100 milliGRAM(s) Oral daily  folic acid 0.4 milliGRAM(s) Oral daily  cyanocobalamin 500 MICROGram(s) Oral daily  montelukast 10 milliGRAM(s) Oral daily  docusate sodium 100 milliGRAM(s) Oral two times a day  amLODIPine   Tablet 5 milliGRAM(s) Oral daily    MEDICATIONS  (PRN):  ALBUTerol    90 MICROgram(s) HFA Inhaler 2 Puff(s) Inhalation every 6 hours PRN Shortness of Breath and/or Wheezing            Vital Signs Last 24 Hrs  T(C): 36.7 (15 Sep 2017 10:57), Max: 36.8 (14 Sep 2017 21:59)  T(F): 98 (15 Sep 2017 10:57), Max: 98.3 (14 Sep 2017 21:59)  HR: 97 (15 Sep 2017 10:57) (78 - 102)  BP: 116/75 (15 Sep 2017 10:57) (85/50 - 116/75)  BP(mean): --  RR: 18 (15 Sep 2017 10:57) (18 - 18)  SpO2: 98% (15 Sep 2017 10:57) (95% - 99%)      VBG pH 7.41 09-15 @ 01:02  VBG pCO2 43 09-15 @ 01:02  VBG O2 sat 36 09-15 @ 01:02  VBG lactate 0.7 09-15 @ 01:02            LABS:                        8.2    15.3  )-----------( 362      ( 15 Sep 2017 05:56 )             25.2     09-15    143  |  105  |  42<H>  ----------------------------<  85  4.6   |  25  |  0.83    Ca    9.1      15 Sep 2017 05:56    TPro  7.0  /  Alb  2.3<L>  /  TBili  0.5  /  DBili  x   /  AST  35  /  ALT  36  /  AlkPhos  215<H>  09-14          CAPILLARY BLOOD GLUCOSE    Urinalysis Basic - ( 15 Sep 2017 05:59 )    Color: Yellow / Appearance: Clear / SG: >1.030 / pH: x  Gluc: x / Ketone: Negative  / Bili: Negative / Urobili: 2   Blood: x / Protein: 30 mg/dL / Nitrite: Negative   Leuk Esterase: Negative / RBC: x / WBC 0-2 /HPF   Sq Epi: x / Non Sq Epi: x / Bacteria: x        CULTURES: pending        Physical Examination:  a/o x3, + anxiety, o2 sat 99% on 100% nrb  General: No acute distress. + anxiety     HEENT: Pupils equal, reactive to light.  Symmetric.    PULM: decreased bs bilat L>R, R chest tube to suction bulb, no significant sputum production    CVS: S1, S2    ABD: Soft, nondistended, nontender, normoactive bowel sounds, no masses    EXT: No edema, nontender    SKIN: Warm and well perfused, no rashes noted.    NEURO: Alert, oriented, interactive, nonfocal, a/0 x3 maex4    RADIOLOGY REVIEWED  CXR: prelim/ increased R effusion    CT chest:< from: CT Angio Chest w/ IV Cont (09.15.17 @ 00:51) >    IMPRESSION:     No pulmonary embolism.    Status post distal esophagectomy with gastric pull-through.   Evaluation   for persistent leak is limited on this examination without contrast.    Moderate/large left pleural effusion, increased in size since 8/26/2017.   Stable small/moderate loculated right pleural effusion with a right-sided   chest tube in place.    Small pericardial effusion is new since 08/26/2017.    Mild subcutaneous edema.      < from: Xray Esophagram (08.21.17 @ 11:39) >  IMPRESSION:      1. Leak in the region of the greater curvature at the level of the   cardioesophageal junction.  2. Contrast is also seen pooling in alinear fashion in the left upper   esophagus, this could represent a contained leak versus redundant   pull-through stomach. PULMONARY CONSULT    Initial HPI on admission:  HPI:  82 M presents with non-functioning J tube and SOB.  Pt diagnosed with esophageal adenocarcinoma s/p tx with chemo and radiation therapy. 8/14/17 Patient  underwent a robotic-assisted [place]-Trent esophagogastrectomy with feeding J tube placement for esophageal adenocarcinoma (T1aN0) with Dr. Dyer and Dr. Christian Weinstein. After surgery, patient was found to have an anastomotic leak and was treated conservatively with NPO, jtube feeds, antibiotics, R chest tube placement-eventually transitioned to suction bulb,. Patient was eventually discharged to rehab on 8/27. On day of evaluation, patient's J tube was unable to be used, and was sent to the ED for evaluation. Patient also complains of SOB, but no chest pain or abdominal pain.  Right CT reported to be draining murky brown fluid, with elsy bulb not inflating, Ct chest was obtained with increase in size of Left pleural effusion. Pt seen in ER anxious, dyspneic on 100% nrb, also with wbc 16k.  Called to eval + anxiety, + sob, -cough, -cp, -pleurtic cp, - fever, -n/v/d, neg leg pain-reported that pt had increased oxygen requirements and now on 100% NRB. I briefly switched pt to 5l nc, pt became acutely dyspneic - then switched back to 100% nrb with o2 sat 99%  In ER recieved abx cefepime, vanco, flagyl. IVF LR @ 75cc/hr, on 100% NRB -02 sat 99%    PAST MEDICAL & SURGICAL HISTORY:  Coronary artery disease: s/p 3 stents on June 30, 2017 at [place] Dr. Lavelle Reid  OA (osteoarthritis) of knee: right  Former smoker, stopped smoking in distant past  Rheumatoid arthritis  Seizure disorder: 1 episode approximately 15 yrs ago  Asthma  centrilobar emphysema  Hyperlipidemia  BPH (benign prostatic hyperplasia)  HTN (hypertension)  Esophagus cancer  Chronic allergic rhinitis  BCC (basal cell carcinoma): skin  Cerebrovascular accident (CVA)  Anxiety  History of tonsillectomy  H/O heart artery stent: June 30, 2017  Knee arthropathy: left  History of total hip replacement: left  History of hernia repair    Allergies    penicillin (Rash)      FAMILY HISTORY:  Family history of heart attack (Sibling)  Family history of pulmonary embolism: father @ 49 yr old  FH: CAD (coronary artery disease)    Social history: former heavy smoker, quit 50 yrs ago, s/p 2ppd x 20yrs    Review of Systems:  CONSTITUTIONAL: No fever, chills, or fatigue  EYES: No eye pain, visual disturbances, or discharge  ENMT:  No difficulty hearing, tinnitus, vertigo; No sinus or throat pain  NECK: No pain or stiffness  RESPIRATORY: Per above  CARDIOVASCULAR: No chest pain, palpitations, dizziness, or leg swelling  GASTROINTESTINAL: No abdominal or epigastric pain. No nausea, vomiting, or hematemesis; No diarrhea or constipation. No melena or hematochezia.  GENITOURINARY: No dysuria, frequency, hematuria, or incontinence  NEUROLOGICAL: No headaches, memory loss, loss of strength, numbness, or tremors  SKIN: No itching, burning, rashes, or lesions   MUSCULOSKELETAL: No joint pain or swelling; No muscle, back, or extremity pain  PSYCHIATRIC: No depression, mood swings, or difficulty sleeping, + anxiety      Medications:  MEDICATIONS  (STANDING):  lactated ringers. 1000 milliLiter(s) (75 mL/Hr) IV Continuous <Continuous>  enoxaparin Injectable 40 milliGRAM(s) SubCutaneous daily  finasteride 5 milliGRAM(s) Oral daily  losartan 50 milliGRAM(s) Oral daily  tamsulosin 0.4 milliGRAM(s) Oral at bedtime  phenytoin   Capsule 100 milliGRAM(s) Oral two times a day  simvastatin 40 milliGRAM(s) Oral at bedtime  pyridoxine 100 milliGRAM(s) Oral daily  folic acid 0.4 milliGRAM(s) Oral daily  cyanocobalamin 500 MICROGram(s) Oral daily  montelukast 10 milliGRAM(s) Oral daily  docusate sodium 100 milliGRAM(s) Oral two times a day  amLODIPine   Tablet 5 milliGRAM(s) Oral daily    MEDICATIONS  (PRN):  ALBUTerol    90 MICROgram(s) HFA Inhaler 2 Puff(s) Inhalation every 6 hours PRN Shortness of Breath and/or Wheezing    Vital Signs Last 24 Hrs  T(C): 36.7 (15 Sep 2017 10:57), Max: 36.8 (14 Sep 2017 21:59)  T(F): 98 (15 Sep 2017 10:57), Max: 98.3 (14 Sep 2017 21:59)  HR: 97 (15 Sep 2017 10:57) (78 - 102)  BP: 116/75 (15 Sep 2017 10:57) (85/50 - 116/75)  BP(mean): --  RR: 18 (15 Sep 2017 10:57) (18 - 18)  SpO2: 98% (15 Sep 2017 10:57) (95% - 99%)      VBG pH 7.41 09-15 @ 01:02  VBG pCO2 43 09-15 @ 01:02  VBG O2 sat 36 09-15 @ 01:02  VBG lactate 0.7 09-15 @ 01:02    LABS:                        8.2    15.3  )-----------( 362      ( 15 Sep 2017 05:56 )             25.2     09-15    143  |  105  |  42<H>  ----------------------------<  85  4.6   |  25  |  0.83    Ca    9.1      15 Sep 2017 05:56    TPro  7.0  /  Alb  2.3<L>  /  TBili  0.5  /  DBili  x   /  AST  35  /  ALT  36  /  AlkPhos  215<H>  09-14          CAPILLARY BLOOD GLUCOSE    Urinalysis Basic - ( 15 Sep 2017 05:59 )    Color: Yellow / Appearance: Clear / SG: >1.030 / pH: x  Gluc: x / Ketone: Negative  / Bili: Negative / Urobili: 2   Blood: x / Protein: 30 mg/dL / Nitrite: Negative   Leuk Esterase: Negative / RBC: x / WBC 0-2 /HPF   Sq Epi: x / Non Sq Epi: x / Bacteria: x        CULTURES: pending        Physical Examination:  a/o x3, + anxiety, o2 sat 99% on 100% nrb  General: No acute distress. + anxiety     HEENT: Pupils equal, reactive to light.  Symmetric.    PULM: decreased bs bilat L>R, R chest tube to suction bulb, no significant sputum production    CVS: S1, S2    ABD: Soft, nondistended, nontender, normoactive bowel sounds, no masses    EXT: No edema, nontender    SKIN: Warm and well perfused, no rashes noted.    NEURO: Alert, oriented, interactive, nonfocal, a/0 x3 maex4    RADIOLOGY REVIEWED  CXR: prelim/ increased R effusion    CT chest:< from: CT Angio Chest w/ IV Cont (09.15.17 @ 00:51) >    IMPRESSION:     No pulmonary embolism.    Status post distal esophagectomy with gastric pull-through.   Evaluation   for persistent leak is limited on this examination without contrast.    Moderate/large left pleural effusion, increased in size since 8/26/2017.   Stable small/moderate loculated right pleural effusion with a right-sided   chest tube in place.    Small pericardial effusion is new since 08/26/2017.    Mild subcutaneous edema.      < from: Xray Esophagram (08.21.17 @ 11:39) >  IMPRESSION:      1. Leak in the region of the greater curvature at the level of the   cardioesophageal junction.  2. Contrast is also seen pooling in alinear fashion in the left upper   esophagus, this could represent a contained leak versus redundant   pull-through stomach.

## 2017-09-15 NOTE — CONSULT NOTE ADULT - SUBJECTIVE AND OBJECTIVE BOX
HISTORY OF PRESENT ILLNESS:  MEJIA LATIF is 82y M, s/p esophagogastrectomy (partial) with intra-thoracic esophagogastric anastomosis ([place]-Trent procedure) on 8/14/17 - post-op complicated by anastomotic leak, discharged to [facility] on 8/27 with R chest drain in place. He was admitted from rehab overnight with clogged jejunostomy feeding tube. Patient reports ongoing SOB, states that he has not required oxygen while at rehab. Denies fevers/chills. Per patient, drainage from R chest has been minimal, murky brown fluid. The drain's bulb has been difficult to keep to self-suction, as it fills with air quickly. Patient reports passage of flatus and stool.  Patient was taking medications by mouth and was only getting nutrition via his J tube.   Of note, patient is on prednisone 5 mg daily for rheumatoid arthritis.    PAST MEDICAL HISTORY: Coronary artery disease  OA (osteoarthritis) of knee  Former smoker, stopped smoking in distant past  Former smoker  Rheumatoid arthritis  Seizure disorder  Asthma  Hyperlipidemia  BPH (benign prostatic hyperplasia)  HTN (hypertension)  Esophagus cancer  Chronic allergic rhinitis  BCC (basal cell carcinoma)  Cerebrovascular accident (CVA)  Anxiety      PAST SURGICAL HISTORY:   Recent [place]- Trent procedure 8/14  History of tonsillectomy  H/O heart artery stent  Knee arthropathy  History of total hip replacement  History of hernia repair      FAMILY HISTORY: Family history of heart attack (Sibling)  Family history of pulmonary embolism  FH: CAD (coronary artery disease)      SOCIAL HISTORY: Former smoker     CODE STATUS:     HOME MEDICATIONS:  · 	aspirin-dipyridamole 25 mg-200 mg oral capsule, extended release: 1 cap(s) orally 2 times a day, Last Dose Taken:    · 	docusate sodium 100 mg oral capsule: 1 cap(s) orally 2 times a day, Last Dose Taken:    · 	ProAir HFA 90 mcg/inh inhalation aerosol: 2 puff(s) inhaled 4 times a day, As Needed , Last Dose Taken:    · 	Vitamin B-12 500 mcg oral tablet: 1 tab(s) orally once a day, Last Dose Taken:    · 	amLODIPine 5 mg oral tablet: 1 tab(s) orally once a day in pm, Last Dose Taken:    · 	simvastatin 40 mg oral tablet: 1 tab(s) orally once a day in pm, Last Dose Taken:    · 	tamsulosin 0.4 mg oral capsule: 1 cap(s) orally once a day in pm, Last Dose Taken:    · 	Zetia 10 mg oral tablet: 1 tab(s) orally once a day in pm, Last Dose Taken:    · 	ALPRAZolam 0.25 mg oral tablet: 2 tab(s) orally 2 times a day, Last Dose Taken:    · 	olmesartan 20 mg oral tablet: 1 tab(s) orally once a day in pm, Last Dose Taken:    · 	montelukast 10 mg oral tablet: 1 tab(s) orally once a day in pm, Last Dose Taken:    · 	Dilantin 100 mg oral capsule: 2 cap(s) orally 2 times a day, Last Dose Taken:    · 	predniSONE 5 mg oral tablet: 1 tab(s) orally once a day in am, Last Dose Taken:    · 	finasteride 5 mg oral tablet: 1 tab(s) orally once a day in pm, Last Dose Taken:    · 	folic acid 0.4 mg oral tablet: 1 tab(s) orally once a day, Last Dose Taken:    · 	Vitamin B6 100 mg oral tablet: 1 tab(s) orally once a day, Last Dose Taken:    ALLERGIES: penicillin (Rash)      VITAL SIGNS:  ICU Vital Signs Last 24 Hrs  T(C): 36.6 (15 Sep 2017 16:00), Max: 36.8 (14 Sep 2017 21:59)  T(F): 97.8 (15 Sep 2017 16:00), Max: 98.3 (14 Sep 2017 21:59)  HR: 96 (15 Sep 2017 16:15) (78 - 102)  BP: 131/64 (15 Sep 2017 16:15) (85/50 - 134/64)  BP(mean): 91 (15 Sep 2017 16:15) (91 - 92)  RR: 38 (15 Sep 2017 16:15) (18 - 38)  SpO2: 98% (15 Sep 2017 16:15) (95% - 100%)      NEURO  Exam: NAD, alert and oriented x 3  Meds:phenytoin   Capsule 100 milliGRAM(s) Oral two times a day      RESPIRATORY  Mechanical Ventilation: n/a    Exam: On 100% NRB, NAD, decreased breath sounds b/l L>R, R chest tube to suction bulb  Meds:montelukast 10 milliGRAM(s) Oral daily  ALBUTerol    90 MICROgram(s) HFA Inhaler 2 Puff(s) Inhalation every 6 hours PRN Shortness of Breath and/or Wheezing  ALBUTerol/ipratropium for Nebulization 3 milliLiter(s) Nebulizer every 6 hours      CARDIOVASCULAR  VBG - ( 15 Sep 2017 14:06 )  pH: 7.40  /  pCO2: 41    /  pO2: 43    / HCO3: 25    / Base Excess: 1.0   /  SaO2: 69     Lactate: 0.7      Exam: S1, S2, RRR  Cardiac Rhythm: NSR with some PVC's   Meds:losartan 50 milliGRAM(s) Oral daily  tamsulosin 0.4 milliGRAM(s) Oral at bedtime  amLODIPine   Tablet 5 milliGRAM(s) Oral daily      GI/NUTRITION  Exam: soft, ND, NTP, J tube in place   Diet: NPO except meds  Meds:docusate sodium 100 milliGRAM(s) Oral two times a day      GENITOURINARY/RENAL  Meds:lactated ringers. 1000 milliLiter(s) IV Continuous <Continuous>  pyridoxine 100 milliGRAM(s) Oral daily  folic acid 0.4 milliGRAM(s) Oral daily  cyanocobalamin 500 MICROGram(s) Oral daily      09-15 @ 07:01  -  09-15 @ 16:36  --------------------------------------------------------  IN:    lactated ringers.: 75 mL  Total IN: 75 mL    OUT:  Total OUT: 0 mL    Total NET: 75 mL        Weight (kg): 63.8 (09-15 @ 16:00)  09-15    143  |  105  |  42<H>  ----------------------------<  85  4.6   |  25  |  0.83    Ca    9.1      15 Sep 2017 05:56    TPro  7.0  /  Alb  2.3<L>  /  TBili  0.5  /  DBili  x   /  AST  35  /  ALT  36  /  AlkPhos  215<H>  09-14    [ ] López catheter, indication: urine output monitoring in critically ill patient    HEMATOLOGIC  [x ] VTE Prophylaxis:  enoxaparin Injectable 40 milliGRAM(s) SubCutaneous daily                          8.7    13.7  )-----------( 358      ( 15 Sep 2017 14:54 )             26.1     PT/INR - ( 15 Sep 2017 14:06 )   PT: 14.7 sec;   INR: 1.35 ratio         PTT - ( 15 Sep 2017 14:06 )  PTT:29.2 sec  Transfusion: [ ] PRBC	[ ] Platelets	[ ] FFP	[ ] Cryoprecipitate      INFECTIOUS DISEASES  Meds: vanco, cefepime and flagyl x1 in ED  RECENT CULTURES:  none     ENDOCRINE  Meds:finasteride 5 milliGRAM(s) Oral daily  simvastatin 40 milliGRAM(s) Oral at bedtime    CAPILLARY BLOOD GLUCOSE      PATIENT CARE ACCESS DEVICES:  [ ] Peripheral IV  [ ] Central Venous Line	[ ] R	[ ] L	[ ] IJ	[ ] Fem	[ ] SC	Placed:   [ ] Arterial Line		[ ] R	[ ] L	[ ] Fem	[ ] Rad	[ ] Ax	Placed:   [ ] PICC:					[ ] Mediport  [ ] Urinary Catheter, Date Placed:   [x] Necessity of urinary, arterial, and venous catheters discussed    OTHER MEDICATIONS:     IMAGING STUDIES:  < from: CT Angio Chest w/ IV Cont (09.15.17 @ 00:51) >  IMPRESSION:     No pulmonary embolism.    Status post distal esophagectomy with gastric pull-through.   Evaluation   for persistent leak is limited on this examination without contrast.    Moderate/large left pleural effusion, increased in size since 8/26/2017.   Stable small/moderate loculated right pleural effusion with a right-sided   chest tube in place.    Small pericardial effusion is new since 08/26/2017.    Mild subcutaneous edema.

## 2017-09-15 NOTE — ED ADULT NURSE REASSESSMENT NOTE - NS ED NURSE REASSESS COMMENT FT1
pt. hypotensive. Pt. has been hypotensive since report received from RN, Ludivina Hernandez. MD aware. Pt. asymptomatic. Alert, oriented, speaking coherently. Not lethargic

## 2017-09-15 NOTE — H&P ADULT - ASSESSMENT
82 M with recent [place]-Trent esophagogastrectomy for esophageal adenocarcinoma presents with non-functioning J tube and SOB. J tube was unclogged at bedside. CTA of the chest shows a stable R sided pleural effusion with the chest tube still in place, and increased size of L pleural effusion. Patient's WBC was also noted to be elevated to 16. Patient is taking prednisone for rheumatoid arthritis, but this could also represent an infection from a persistent leak. Will admit to [facility] ([facility]).  -NPO for now  -IVF  -VTE prophylaxis  -Start prophylactic antibiotics (cefepime, Flagyl, vancomycin)  -Will need a formal study to assess for a leak  -Will need Thoracic Surgery and Pulmonary consult  -D/w attending  TUCKER Jiang  [id number] 82 M with recent [place]-Trent esophagogastrectomy for esophageal adenocarcinoma presents with non-functioning J tube and SOB. J tube was unclogged at bedside. CTA of the chest shows a stable R sided pleural effusion with the chest tube still in place, and increased size of L pleural effusion. Patient's WBC was also noted to be elevated to 16. Patient is taking prednisone for rheumatoid arthritis, but this could also represent an infection from a persistent leak. Will admit to [facility] ([facility]).  -NPO for now  -IVF  -VTE prophylaxis  -Start prophylactic antibiotics (cefepime, Flagyl, vancomycin). F/u UA and blood cultures.  -Will need a formal study to assess for a leak  -Will need Thoracic Surgery and Pulmonary consult  -D/w attending  TUCKER Jiang  [phone]

## 2017-09-15 NOTE — H&P ADULT - PMH
Anxiety    Asthma    BCC (basal cell carcinoma)  skin  BPH (benign prostatic hyperplasia)    Cerebrovascular accident (CVA)    Chronic allergic rhinitis    Coronary artery disease  s/p 3 stents on June 30, 2017 at [place] Dr. Lavelle Reid  Esophagus cancer    Former smoker, stopped smoking in distant past    HTN (hypertension)    Hyperlipidemia    OA (osteoarthritis) of knee  right  Rheumatoid arthritis    Seizure disorder  1 episode approximately 15 yrs ago

## 2017-09-15 NOTE — H&P ADULT - NSHPLABSRESULTS_GEN_ALL_CORE
09-14-17    WBC: 16.6  Hgb: 8.4  Hct: 24.7  Plt: 356    Na: 141  K: 4.6  Cl: 104  HCO3: 23  BUN: 42  Cr: 0.87  Glu: 82    Ca: 9.0    Protein: 7.0  Albumin: 2.3  Total bilirubin: 0.5  AST: 35  ALT: 36    CTA chest: Moderate/large left pleural effusion, increased in size since 8/26/2017. Stable small/moderate loculated right pleural effusion with a right-sided chest tube in place. 09-14-17    WBC: 16.6  Hgb: 8.4  Hct: 24.7  Plt: 356    Na: 141  K: 4.6  Cl: 104  HCO3: 23  BUN: 42  Cr: 0.87  Glu: 82    Ca: 9.0    Protein: 7.0  Albumin: 2.3  Total bilirubin: 0.5  AST: 35  ALT: 36    CTA chest: Moderate/large left pleural effusion, increased in size since 8/26/2017. Stable small/moderate loculated right pleural effusion with a right-sided chest tube that has dark contents.

## 2017-09-15 NOTE — ED ADULT NURSE REASSESSMENT NOTE - NS ED NURSE REASSESS COMMENT FT1
report received from SANJUANA Cash. Pt. sitting in stretcher in no acute distress. Surgery team at bedside evaluating patient. Comfort and safety maintained. Pt. admitted to surgery, awaiting bed.

## 2017-09-15 NOTE — CONSULT NOTE ADULT - SUBJECTIVE AND OBJECTIVE BOX
THORACIC SURGERY CONSULT    Consulting surgical team: Thoracic - ([id number])  Consulting attending: Christian Weinstein  Patient seen and examined: 09-15-17 @ 10:40    82y M, s/p esophagogastrectomy (partial) with intra-thoracic esophagogastric anastomosis (Vance-Trent procedure) on 8/14/17 - post-op complicated by anastomotic leak, discharged to [facility] on 8/27 with R chest drain in place. He was admitted from rehab overnight with clogged jejunostomy feeding tube. Patient reports ongoing SOB, states that he has not required oxygen while at rehab. Denies fevers/chills. Per patient, drainage from R chest has been minimal, murky brown fluid. The drain's bulb has been difficult to keep to self-suction, as it fills with air quickly. Patient reports passage of flatus and stool.  Of note, patient is on prednisone 5 mg daily for rheumatoid arthritis.      PAST MEDICAL HISTORY:  Coronary artery disease  OA (osteoarthritis) of knee  Former smoker, stopped smoking in distant past  Former smoker  Rheumatoid arthritis  Seizure disorder  Asthma  Hyperlipidemia  BPH (benign prostatic hyperplasia)  HTN (hypertension)  Esophagus cancer  Chronic allergic rhinitis  BCC (basal cell carcinoma)  Cerebrovascular accident (CVA)  Anxiety      PAST SURGICAL HISTORY:  History of tonsillectomy  H/O heart artery stent  Knee arthropathy  History of total hip replacement  History of hernia repair      ALLERGIES:  penicillin (Rash)      MEDICATIONS  (STANDING):  lactated ringers. 1000 milliLiter(s) (75 mL/Hr) IV Continuous <Continuous>  enoxaparin Injectable 40 milliGRAM(s) SubCutaneous daily    MEDICATIONS  (PRN):      VITALS & I/Os:  Vital Signs Last 24 Hrs  T(C): 36.6 (15 Sep 2017 07:40), Max: 36.8 (14 Sep 2017 21:59)  T(F): 97.9 (15 Sep 2017 07:40), Max: 98.3 (14 Sep 2017 21:59)  HR: 96 (15 Sep 2017 07:40) (78 - 102)  BP: 116/71 (15 Sep 2017 07:40) (85/50 - 116/71)  BP(mean): --  RR: 18 (15 Sep 2017 07:40) (18 - 18)  SpO2: 98% (15 Sep 2017 07:40) (95% - 99%)  CAPILLARY BLOOD GLUCOSE            GEN: NAD, alert and oriented x 3  HEENT: WNL  CHEST: Symmetrical chest rise, no increased work of breathing/use of accessory muscles. Lung sounds absent at the base b/l.  HEART: RRR, non-muffled heart sounds  ABD: Soft, non-tender, non-distended. Surgical incisions well-healed      LABS:                        8.2    15.3  )-----------( 362      ( 15 Sep 2017 05:56 )             25.2     09-15    143  |  105  |  42<H>  ----------------------------<  85  4.6   |  25  |  0.83    Ca    9.1      15 Sep 2017 05:56    TPro  7.0  /  Alb  2.3<L>  /  TBili  0.5  /  DBili  x   /  AST  35  /  ALT  36  /  AlkPhos  215<H>  09-14    Lactate: lactated ringers. 1000 milliLiter(s) IV Continuous <Continuous>  enoxaparin Injectable 40 milliGRAM(s) SubCutaneous daily             09-15 @ 01:02  0.7    Urinalysis Basic - ( 15 Sep 2017 05:59 )    Color: Yellow / Appearance: Clear / SG: >1.030 / pH: x  Gluc: x / Ketone: Negative  / Bili: Negative / Urobili: 2   Blood: x / Protein: 30 mg/dL / Nitrite: Negative   Leuk Esterase: Negative / RBC: x / WBC 0-2 /HPF   Sq Epi: x / Non Sq Epi: x / Bacteria: x        IMAGING:  < from: CT Angio Chest w/ IV Cont (09.15.17 @ 00:51) >  IMPRESSION:     No pulmonary embolism.    Status post distal esophagectomy with gastric pull-through.   Evaluation   for persistent leak is limited on this examination without contrast.    Moderate/large left pleural effusion, increased in size since 8/26/2017.   Stable small/moderate loculated right pleural effusion with a right-sided   chest tube in place.    Small pericardial effusion is new since 08/26/2017.    Mild subcutaneous edema.    < end of copied text >      ASSESSMENT & PLAN  82y M, s/p Vance-Trent, possible persistent anastomotic leak, possible infected pleural effusion(s). Jejunostomy feeding tube unclogged by general surgery. Case will be discussed with Dr. Weinstein, but would definitely recommend continued NPO status, and would tentatively recommend contrast esophagram. Loss of suction on R chest drain bulb could indicate air leakage from esophagogastric anastomosis. Dr Cohen would like to hold off on antibiosis for the moment.  - f/u IM (Francisco)  - f/u Pulmonary consultation THORACIC SURGERY CONSULT    Consulting surgical team: Thoracic - ([id number])  Consulting attending: Christian Weinstein  Patient seen and examined: 09-15-17 @ 10:40    82y M, s/p esophagogastrectomy (partial) with intra-thoracic esophagogastric anastomosis (Vance-Trent procedure) on 8/14/17 - post-op complicated by anastomotic leak, discharged to [facility] on 8/27 with R chest drain in place. He was admitted from rehab overnight with clogged jejunostomy feeding tube. Patient reports ongoing SOB, states that he has not required oxygen while at rehab. Denies fevers/chills. Per patient, drainage from R chest has been minimal, murky brown fluid. The drain's bulb has been difficult to keep to self-suction, as it fills with air quickly. Patient reports passage of flatus and stool.  Of note, patient is on prednisone 5 mg daily for rheumatoid arthritis.      PAST MEDICAL HISTORY:  Coronary artery disease  OA (osteoarthritis) of knee  Former smoker, stopped smoking in distant past  Former smoker  Rheumatoid arthritis  Seizure disorder  Asthma  Hyperlipidemia  BPH (benign prostatic hyperplasia)  HTN (hypertension)  Esophagus cancer  Chronic allergic rhinitis  BCC (basal cell carcinoma)  Cerebrovascular accident (CVA)  Anxiety      PAST SURGICAL HISTORY:  History of tonsillectomy  H/O heart artery stent  Knee arthropathy  History of total hip replacement  History of hernia repair      ALLERGIES:  penicillin (Rash)      MEDICATIONS  (STANDING):  lactated ringers. 1000 milliLiter(s) (75 mL/Hr) IV Continuous <Continuous>  enoxaparin Injectable 40 milliGRAM(s) SubCutaneous daily    MEDICATIONS  (PRN):      VITALS & I/Os:  Vital Signs Last 24 Hrs  T(C): 36.6 (15 Sep 2017 07:40), Max: 36.8 (14 Sep 2017 21:59)  T(F): 97.9 (15 Sep 2017 07:40), Max: 98.3 (14 Sep 2017 21:59)  HR: 96 (15 Sep 2017 07:40) (78 - 102)  BP: 116/71 (15 Sep 2017 07:40) (85/50 - 116/71)  BP(mean): --  RR: 18 (15 Sep 2017 07:40) (18 - 18)  SpO2: 98% (15 Sep 2017 07:40) (95% - 99%)  CAPILLARY BLOOD GLUCOSE            GEN: NAD, alert and oriented x 3  HEENT: WNL  CHEST: Symmetrical chest rise, no increased work of breathing/use of accessory muscles. Lung sounds absent at the base b/l. R chest drain with minimal murky, brown fluid in bulb.  HEART: RRR, non-muffled heart sounds  ABD: Soft, non-tender, non-distended. Surgical incisions well-healed      LABS:                        8.2    15.3  )-----------( 362      ( 15 Sep 2017 05:56 )             25.2     09-15    143  |  105  |  42<H>  ----------------------------<  85  4.6   |  25  |  0.83    Ca    9.1      15 Sep 2017 05:56    TPro  7.0  /  Alb  2.3<L>  /  TBili  0.5  /  DBili  x   /  AST  35  /  ALT  36  /  AlkPhos  215<H>  09-14    Lactate: lactated ringers. 1000 milliLiter(s) IV Continuous <Continuous>  enoxaparin Injectable 40 milliGRAM(s) SubCutaneous daily             09-15 @ 01:02  0.7    Urinalysis Basic - ( 15 Sep 2017 05:59 )    Color: Yellow / Appearance: Clear / SG: >1.030 / pH: x  Gluc: x / Ketone: Negative  / Bili: Negative / Urobili: 2   Blood: x / Protein: 30 mg/dL / Nitrite: Negative   Leuk Esterase: Negative / RBC: x / WBC 0-2 /HPF   Sq Epi: x / Non Sq Epi: x / Bacteria: x        IMAGING:  < from: CT Angio Chest w/ IV Cont (09.15.17 @ 00:51) >  IMPRESSION:     No pulmonary embolism.    Status post distal esophagectomy with gastric pull-through.   Evaluation   for persistent leak is limited on this examination without contrast.    Moderate/large left pleural effusion, increased in size since 8/26/2017.   Stable small/moderate loculated right pleural effusion with a right-sided   chest tube in place.    Small pericardial effusion is new since 08/26/2017.    Mild subcutaneous edema.    < end of copied text >      ASSESSMENT & PLAN  82y M, s/p Vance-Trent, possible persistent anastomotic leak, possible infected pleural effusion(s). Jejunostomy feeding tube unclogged by general surgery. Case will be discussed with Dr. Weinstein, but would definitely recommend continued NPO status, and would tentatively recommend contrast esophagram. Loss of suction on R chest drain bulb could indicate air leakage from esophagogastric anastomosis. Dr Cohen would like to hold off on antibiosis for the moment.  - IR pigtail drainage of L effusion  - Respiratory support (O2 as needed, nebulizer treatments)  - Will plan to study R chest drain, leave in place to self-suction for now    Discussed with Dr. Gibson

## 2017-09-16 LAB
-  COAGULASE NEGATIVE STAPHYLOCOCCUS: SIGNIFICANT CHANGE UP
ANION GAP SERPL CALC-SCNC: 13 MMOL/L — SIGNIFICANT CHANGE UP (ref 5–17)
APTT BLD: 28.4 SEC — SIGNIFICANT CHANGE UP (ref 27.5–37.4)
APTT BLD: 29 SEC — SIGNIFICANT CHANGE UP (ref 27.5–37.4)
BUN SERPL-MCNC: 33 MG/DL — HIGH (ref 7–23)
CA-I BLD-SCNC: 1.32 MMOL/L — HIGH (ref 1.12–1.3)
CALCIUM SERPL-MCNC: 9.2 MG/DL — SIGNIFICANT CHANGE UP (ref 8.4–10.5)
CHLORIDE SERPL-SCNC: 106 MMOL/L — SIGNIFICANT CHANGE UP (ref 96–108)
CO2 SERPL-SCNC: 22 MMOL/L — SIGNIFICANT CHANGE UP (ref 22–31)
CREAT SERPL-MCNC: 0.74 MG/DL — SIGNIFICANT CHANGE UP (ref 0.5–1.3)
GAS PNL BLDV: SIGNIFICANT CHANGE UP
GLUCOSE SERPL-MCNC: 76 MG/DL — SIGNIFICANT CHANGE UP (ref 70–99)
GRAM STN FLD: SIGNIFICANT CHANGE UP
HCT VFR BLD CALC: 25.2 % — LOW (ref 39–50)
HCT VFR BLD CALC: 27.9 % — LOW (ref 39–50)
HGB BLD-MCNC: 8.4 G/DL — LOW (ref 13–17)
HGB BLD-MCNC: 9.5 G/DL — LOW (ref 13–17)
INR BLD: 1.05 RATIO — SIGNIFICANT CHANGE UP (ref 0.88–1.16)
INR BLD: 1.46 RATIO — HIGH (ref 0.88–1.16)
LDH SERPL L TO P-CCNC: 230 U/L — SIGNIFICANT CHANGE UP (ref 50–242)
MAGNESIUM SERPL-MCNC: 2.3 MG/DL — SIGNIFICANT CHANGE UP (ref 1.6–2.6)
MCHC RBC-ENTMCNC: 31.9 PG — SIGNIFICANT CHANGE UP (ref 27–34)
MCHC RBC-ENTMCNC: 32 PG — SIGNIFICANT CHANGE UP (ref 27–34)
MCHC RBC-ENTMCNC: 33.1 GM/DL — SIGNIFICANT CHANGE UP (ref 32–36)
MCHC RBC-ENTMCNC: 34.1 GM/DL — SIGNIFICANT CHANGE UP (ref 32–36)
MCV RBC AUTO: 93.4 FL — SIGNIFICANT CHANGE UP (ref 80–100)
MCV RBC AUTO: 96.6 FL — SIGNIFICANT CHANGE UP (ref 80–100)
METHOD TYPE: SIGNIFICANT CHANGE UP
PHOSPHATE SERPL-MCNC: 3.9 MG/DL — SIGNIFICANT CHANGE UP (ref 2.5–4.5)
PLATELET # BLD AUTO: 324 K/UL — SIGNIFICANT CHANGE UP (ref 150–400)
PLATELET # BLD AUTO: SIGNIFICANT CHANGE UP K/UL (ref 150–400)
POTASSIUM SERPL-MCNC: 4.3 MMOL/L — SIGNIFICANT CHANGE UP (ref 3.5–5.3)
POTASSIUM SERPL-SCNC: 4.3 MMOL/L — SIGNIFICANT CHANGE UP (ref 3.5–5.3)
PROCALCITONIN SERPL-MCNC: 0.27 NG/ML — HIGH (ref 0–0.04)
PROTHROM AB SERPL-ACNC: 11.3 SEC — SIGNIFICANT CHANGE UP (ref 9.8–12.7)
PROTHROM AB SERPL-ACNC: 15.9 SEC — HIGH (ref 9.8–12.7)
RBC # BLD: 2.61 M/UL — LOW (ref 4.2–5.8)
RBC # BLD: 2.99 M/UL — LOW (ref 4.2–5.8)
RBC # FLD: 12.9 % — SIGNIFICANT CHANGE UP (ref 10.3–14.5)
RBC # FLD: 13.5 % — SIGNIFICANT CHANGE UP (ref 10.3–14.5)
SODIUM SERPL-SCNC: 141 MMOL/L — SIGNIFICANT CHANGE UP (ref 135–145)
SPECIMEN SOURCE: SIGNIFICANT CHANGE UP
SPECIMEN SOURCE: SIGNIFICANT CHANGE UP
WBC # BLD: 10.8 K/UL — HIGH (ref 3.8–10.5)
WBC # BLD: SIGNIFICANT CHANGE UP K/UL (ref 3.8–10.5)
WBC # FLD AUTO: 10.8 K/UL — HIGH (ref 3.8–10.5)
WBC # FLD AUTO: SIGNIFICANT CHANGE UP K/UL (ref 3.8–10.5)

## 2017-09-16 PROCEDURE — 88342 IMHCHEM/IMCYTCHM 1ST ANTB: CPT | Mod: 26

## 2017-09-16 PROCEDURE — 71010: CPT | Mod: 26

## 2017-09-16 PROCEDURE — 99232 SBSQ HOSP IP/OBS MODERATE 35: CPT

## 2017-09-16 PROCEDURE — 99233 SBSQ HOSP IP/OBS HIGH 50: CPT

## 2017-09-16 PROCEDURE — 88305 TISSUE EXAM BY PATHOLOGIST: CPT | Mod: 26

## 2017-09-16 PROCEDURE — 99222 1ST HOSP IP/OBS MODERATE 55: CPT | Mod: 24

## 2017-09-16 PROCEDURE — 88112 CYTOPATH CELL ENHANCE TECH: CPT | Mod: 26

## 2017-09-16 PROCEDURE — 88341 IMHCHEM/IMCYTCHM EA ADD ANTB: CPT | Mod: 26

## 2017-09-16 RX ORDER — PYRIDOXINE HCL (VITAMIN B6) 100 MG
100 TABLET ORAL DAILY
Qty: 0 | Refills: 0 | Status: DISCONTINUED | OUTPATIENT
Start: 2017-09-16 | End: 2017-09-20

## 2017-09-16 RX ORDER — FOLIC ACID 0.8 MG
1 TABLET ORAL DAILY
Qty: 0 | Refills: 0 | Status: DISCONTINUED | OUTPATIENT
Start: 2017-09-16 | End: 2017-09-20

## 2017-09-16 RX ORDER — FOSPHENYTOIN 50 MG/ML
100 INJECTION INTRAMUSCULAR; INTRAVENOUS DAILY
Qty: 0 | Refills: 0 | Status: DISCONTINUED | OUTPATIENT
Start: 2017-09-16 | End: 2017-09-16

## 2017-09-16 RX ORDER — LABETALOL HCL 100 MG
10 TABLET ORAL
Qty: 0 | Refills: 0 | Status: DISCONTINUED | OUTPATIENT
Start: 2017-09-16 | End: 2017-09-17

## 2017-09-16 RX ORDER — FUROSEMIDE 40 MG
20 TABLET ORAL DAILY
Qty: 0 | Refills: 0 | Status: DISCONTINUED | OUTPATIENT
Start: 2017-09-16 | End: 2017-09-16

## 2017-09-16 RX ORDER — SODIUM CHLORIDE 9 MG/ML
1000 INJECTION, SOLUTION INTRAVENOUS
Qty: 0 | Refills: 0 | Status: DISCONTINUED | OUTPATIENT
Start: 2017-09-16 | End: 2017-09-17

## 2017-09-16 RX ORDER — PREGABALIN 225 MG/1
500 CAPSULE ORAL DAILY
Qty: 0 | Refills: 0 | Status: DISCONTINUED | OUTPATIENT
Start: 2017-09-16 | End: 2017-09-20

## 2017-09-16 RX ORDER — FOSPHENYTOIN 50 MG/ML
100 INJECTION INTRAMUSCULAR; INTRAVENOUS EVERY 24 HOURS
Qty: 0 | Refills: 0 | Status: DISCONTINUED | OUTPATIENT
Start: 2017-09-16 | End: 2017-09-17

## 2017-09-16 RX ORDER — FUROSEMIDE 40 MG
10 TABLET ORAL DAILY
Qty: 0 | Refills: 0 | Status: DISCONTINUED | OUTPATIENT
Start: 2017-09-16 | End: 2017-09-18

## 2017-09-16 RX ADMIN — MONTELUKAST 10 MILLIGRAM(S): 4 TABLET, CHEWABLE ORAL at 11:30

## 2017-09-16 RX ADMIN — LIDOCAINE 1 PATCH: 4 CREAM TOPICAL at 09:05

## 2017-09-16 RX ADMIN — AMLODIPINE BESYLATE 5 MILLIGRAM(S): 2.5 TABLET ORAL at 05:01

## 2017-09-16 RX ADMIN — CEFEPIME 100 MILLIGRAM(S): 1 INJECTION, POWDER, FOR SOLUTION INTRAMUSCULAR; INTRAVENOUS at 05:01

## 2017-09-16 RX ADMIN — ENOXAPARIN SODIUM 40 MILLIGRAM(S): 100 INJECTION SUBCUTANEOUS at 11:29

## 2017-09-16 RX ADMIN — Medication 150 MILLIGRAM(S): at 17:33

## 2017-09-16 RX ADMIN — Medication 100 MILLIGRAM(S): at 10:50

## 2017-09-16 RX ADMIN — Medication 3 MILLILITER(S): at 05:27

## 2017-09-16 RX ADMIN — Medication 100 MILLIGRAM(S): at 11:30

## 2017-09-16 RX ADMIN — Medication 150 MILLIGRAM(S): at 05:02

## 2017-09-16 RX ADMIN — FINASTERIDE 5 MILLIGRAM(S): 5 TABLET, FILM COATED ORAL at 12:25

## 2017-09-16 RX ADMIN — Medication 100 MILLIGRAM(S): at 06:44

## 2017-09-16 RX ADMIN — Medication 3 MILLILITER(S): at 11:38

## 2017-09-16 RX ADMIN — LOSARTAN POTASSIUM 50 MILLIGRAM(S): 100 TABLET, FILM COATED ORAL at 05:01

## 2017-09-16 RX ADMIN — Medication 3 MILLILITER(S): at 23:56

## 2017-09-16 RX ADMIN — Medication 100 MILLIGRAM(S): at 05:02

## 2017-09-16 RX ADMIN — Medication 3 MILLILITER(S): at 17:23

## 2017-09-16 RX ADMIN — PREGABALIN 500 MICROGRAM(S): 225 CAPSULE ORAL at 11:30

## 2017-09-16 RX ADMIN — Medication 20 MILLIGRAM(S): at 11:30

## 2017-09-16 RX ADMIN — SODIUM CHLORIDE 100 MILLILITER(S): 9 INJECTION, SOLUTION INTRAVENOUS at 20:15

## 2017-09-16 RX ADMIN — Medication 100 MILLIGRAM(S): at 13:14

## 2017-09-16 RX ADMIN — Medication 0.4 MILLIGRAM(S): at 11:30

## 2017-09-16 RX ADMIN — CEFEPIME 100 MILLIGRAM(S): 1 INJECTION, POWDER, FOR SOLUTION INTRAMUSCULAR; INTRAVENOUS at 17:01

## 2017-09-16 NOTE — PROGRESS NOTE ADULT - SUBJECTIVE AND OBJECTIVE BOX
MEJIA MCCORDBA:[id number],   82yMale followed for: esophageal cancer  penicillin (Rash)    PAST MEDICAL & SURGICAL HISTORY:  Coronary artery disease: s/p 3 stents on June 30, 2017 at [place] Dr. Lavelle Reid  OA (osteoarthritis) of knee: right  Former smoker, stopped smoking in distant past  Rheumatoid arthritis  Seizure disorder: 1 episode approximately 15 yrs ago  Asthma  Hyperlipidemia  BPH (benign prostatic hyperplasia)  HTN (hypertension)  Esophagus cancer  Chronic allergic rhinitis  BCC (basal cell carcinoma): skin  Cerebrovascular accident (CVA)  Anxiety  History of tonsillectomy  H/O heart artery stent: June 30, 2017  Knee arthropathy: left  History of total hip replacement: left  History of hernia repair    FAMILY HISTORY:  Family history of heart attack (Sibling)  Family history of pulmonary embolism: father @ 49 yr old  FH: CAD (coronary artery disease)    MEDICATIONS  (STANDING):  enoxaparin Injectable 40 milliGRAM(s) SubCutaneous daily  ALBUTerol/ipratropium for Nebulization 3 milliLiter(s) Nebulizer every 6 hours  influenza   Vaccine 0.5 milliLiter(s) IntraMuscular once  cefepime  IVPB 1000 milliGRAM(s) IV Intermittent every 12 hours  metroNIDAZOLE  IVPB 500 milliGRAM(s) IV Intermittent every 8 hours  vancomycin  IVPB 750 milliGRAM(s) IV Intermittent every 12 hours  phenytoin   Suspension 100 milliGRAM(s) Enteral Tube two times a day  finasteride 5 milliGRAM(s) Oral daily  losartan 50 milliGRAM(s) Enteral Tube daily  simvastatin 40 milliGRAM(s) Oral at bedtime  pyridoxine 100 milliGRAM(s) Oral daily  folic acid 0.4 milliGRAM(s) Enteral Tube daily  cyanocobalamin 500 MICROGram(s) Oral daily  montelukast 10 milliGRAM(s) Oral daily  amLODIPine   Tablet 5 milliGRAM(s) Oral daily  polyethylene glycol 3350 17 Gram(s) Oral daily  furosemide    Tablet 20 milliGRAM(s) Oral daily    MEDICATIONS  (PRN):      Vital Signs Last 24 Hrs  T(C): 36.4 (16 Sep 2017 07:00), Max: 36.7 (15 Sep 2017 10:57)  T(F): 97.6 (16 Sep 2017 07:00), Max: 98 (15 Sep 2017 10:57)  HR: 90 (16 Sep 2017 10:00) (80 - 98)  BP: 108/54 (16 Sep 2017 10:00) (96/50 - 140/83)  BP(mean): 75 (16 Sep 2017 10:00) (69 - 105)  RR: 30 (16 Sep 2017 10:00) (18 - 39)  SpO2: 100% (16 Sep 2017 10:00) (94% - 100%)  nc/at  s1s2  cta  soft, nt, nd no guarding or rebound  no c/c/e    CBC Full  -  ( 16 Sep 2017 04:56 )  WBC Count : 10.8 K/uL  Hemoglobin : 8.4 g/dL  Hematocrit : 25.2 %  Platelet Count - Automated : 324 K/uL  Mean Cell Volume : 96.6 fl  Mean Cell Hemoglobin : 32.0 pg  Mean Cell Hemoglobin Concentration : 33.1 gm/dL  Auto Neutrophil # : x  Auto Lymphocyte # : x  Auto Monocyte # : x  Auto Eosinophil # : x  Auto Basophil # : x  Auto Neutrophil % : x  Auto Lymphocyte % : x  Auto Monocyte % : x  Auto Eosinophil % : x  Auto Basophil % : x    09-16    141  |  106  |  33<H>  ----------------------------<  76  4.3   |  22  |  0.74    Ca    9.2      16 Sep 2017 04:56  Phos  3.9     09-16  Mg     2.3     09-16    TPro  7.0  /  Alb  2.3<L>  /  TBili  0.5  /  DBili  x   /  AST  35  /  ALT  36  /  AlkPhos  215<H>  09-14    PT/INR - ( 16 Sep 2017 04:56 )   PT: 15.9 sec;   INR: 1.46 ratio         PTT - ( 16 Sep 2017 04:56 )  PTT:29.0 sec

## 2017-09-16 NOTE — PROGRESS NOTE ADULT - SUBJECTIVE AND OBJECTIVE BOX
82yPatient is a 82y old  Male who presents with a chief complaint of feeding tube clogged s/p maryjane trent (15 Sep 2017 16:33)      Interval history:  82 M presents with non-functioning J tube and SOB. Patient recently underwent a robotic-assisted [place]jane-Trent esophagogastrectomy with feeding J tube placement for esophageal adenocarcinoma (T1aN0) with Dr. Dyer and Dr. Christian Weinstein on 8/14/17. After surgery, patient was found to have an anastomotic leak and was treated conservatively with NPO, antibiotics, chest tube drainage, and J tube feeding. Patient was eventually discharged to rehab. On day of evaluation, patient's J tube was unable to be used, and was sent to the ED for evaluation.     Found to have L. pleural effusion. S/p thoracentesis. Cultures pending. Blood cultures w/ CoNS.    Antimicrobials:    cefepime  IVPB 1000 milliGRAM(s) IV Intermittent every 12 hours  metroNIDAZOLE  IVPB 500 milliGRAM(s) IV Intermittent every 8 hours  vancomycin  IVPB 750 milliGRAM(s) IV Intermittent every 12 hours    REVIEW OF SYSTEMS: Thirsty, but feeling okay.  General:	Denies any malaise fatigue or chills. Fevers absent    Skin:No rash  	  Ophthalmologic:Denies any visual complaints,discharge redness or photophobia  	  ENMT:No nasal discharge,headache,sinus congestion or throat pain.No dental complaints    Respiratory and Thorax:No cough,sputum or chest pain.Denies shortness of breath  	  Cardiovascular:	No chest pain,palpitaions or dizziness    Gastrointestinal:	NO nausea,abdominal pain or diarrhea.    Genitourinary:	No dysuria,frequency. No flank pain    Musculoskeletal:	No joint swelling or pain.No weakness    Neurological:No confusion,diziness.No extremity weakness.No bladder or bowel incontinence	    Psychiatric:No delusions or hallucinations	    Hematology/Lymphatics:	No LN swelling.No gum bleeding     Endocrine:	No recent weight gain or loss.No abnormal heat/cold intolerance    Allergic/Immunologic:	No hives or rash     Vital Signs Last 24 Hrs  T(C): 36.4 (09-16-17 @ 07:00), Max: 36.7 (09-15-17 @ 10:57)  T(F): 97.6 (09-16-17 @ 07:00), Max: 98 (09-15-17 @ 10:57)  HR: 88 (09-16-17 @ 08:00) (80 - 98)  BP: 113/58 (09-16-17 @ 08:00) (96/50 - 140/83)  BP(mean): 82 (09-16-17 @ 08:00) (69 - 105)  RR: 27 (09-16-17 @ 08:00) (18 - 39)  SpO2: 100% (09-16-17 @ 08:00) (94% - 100%)    PHYSICAL EXAM:Pleasant patient in no acute distress.   HEAD/EYES: anicteric, PERRL  ENT:  supple  Cardiovascular:   S1,S 2+  Respiratory:  Decreased bs b/l, L>R, rales right base  GI:  soft, non-tender, normal bowel sounds, j tube noted  :  no CVA tenderness   Musculoskeletal:  no synovitis  Neurologic:  grossly non-focal  Skin: Lymph: no lymphadenopathy  Psychiatric:  appropriate affect  Vascular:  no phlebitis                                  8.4    10.8  )-----------( 324      ( 16 Sep 2017 04:56 )             25.2   09-16    141  |  106  |  33<H>  ----------------------------<  76  4.3   |  22  |  0.74    Ca    9.2      16 Sep 2017 04:56  Phos  3.9     09-16  Mg     2.3     09-16    TPro  7.0  /  Alb  2.3<L>  /  TBili  0.5  /  DBili  x   /  AST  35  /  ALT  36  /  AlkPhos  215<H>  09-14      LIVER FUNCTIONS - ( 14 Sep 2017 23:47 )  Alb: 2.3 g/dL / Pro: 7.0 g/dL / ALK PHOS: 215 U/L / ALT: 36 U/L RC / AST: 35 U/L / GGT: x           RECENT CULTURES:  Culture - Body Fluid with Gram Stain (09.16.17 @ 00:58)    Gram Stain:   No polymorphonuclear cells seen  No organisms seen  by cytocentrifuge    Specimen Source: .Body Fluid Pleural Fluid    Culture - Blood (09.15.17 @ 07:47)    Specimen Source: .Blood Blood-Venous    Culture Results:   No growth to date.    Culture - Blood (09.15.17 @ 07:46)    -  Coagulase negative Staphylococcus: Detec    Gram Stain:   Growth in anaerobic bottle: Gram Positive Cocci in Clusters  Growth in aerobic bottle:  Gram Positive Cocci in Clusters  Gram Positive Rods    Specimen Source: .Blood Blood-Peripheral    Organism: Blood Culture PCR    Culture Results:   Growth in anaerobic bottle: Gram Positive Cocci in Clusters  ***Blood Panel PCR results on this specimen are available  approximately 3 hours after the Gram stain result.***  Gram stain, PCR, and/or culture results may not always  correspond due todifference in methodologies.  ************************************************************  This PCR assay was performed using [employer] Joy.  The following targets are tested for: Enterococcus,  vancomycin resistant enterococci, Listeria monocytogenes,  coagulase negative staphylococci, S. aureus,  methicillin resistant S. aureus, Streptococcus agalactiae  (Group B), S. pneumoniae, S. pyogenes (Group A),  Acinetobacter baumannii, Enterobacter cloacae, E. coli,  Klebsiella oxytoca, K. pneumoniae, Proteus sp.,  Serratia marcescens, Haemophilus influenzae,  Neisseria meningitidis, Pseudomonas aeruginosa, Candida  albicans, C. glabrata, C krusei, C parapsilosis,  C. tropicalis and the KPC resistance gene.  Growth in aerobic bottle:  Gram Positive Cocci in Clusters  Gram Positive Rods    Organism Identification: Blood Culture PCR    Method Type: PCR          Radiology:    < from: Xray Chest 1 View AP -PORTABLE-Routine (09.16.17 @ 07:33) >  Bilateral chest tubes remain in situ.  Interval increase in right pleural effusion with loculated component.  Trace left pleural effusion is stable.    < end of copied text >

## 2017-09-16 NOTE — PROGRESS NOTE ADULT - SUBJECTIVE AND OBJECTIVE BOX
Blue Team Surgery Progress Note - Pager [phone]    Patient is a 82y old  Male who presents with a chief complaint of feeding tube clogged s/p maryjane ford (15 Sep 2017 16:33)      SUBJECTIVE: Patient seen and examined on blue team morning and afternoon rounds. Respiratory status has improved. No nausea/vomiting. Pain is controlled.      OBJECTIVE:     ** Physical Exam **  Gen: Alert, NAD.  Chest tubes in place. Unlabored breathing.    ** Vital signs / I&O's **    Vital Signs Last 24 Hrs  T(C): 36.7 (16 Sep 2017 15:00), Max: 36.7 (16 Sep 2017 15:00)  T(F): 98 (16 Sep 2017 15:00), Max: 98 (16 Sep 2017 15:00)  HR: 94 (16 Sep 2017 19:00) (80 - 95)  BP: 116/63 (16 Sep 2017 19:00) (97/52 - 140/83)  BP(mean): 76 (16 Sep 2017 19:00) (69 - 105)  RR: 29 (16 Sep 2017 19:00) (23 - 30)  SpO2: 100% (16 Sep 2017 19:00) (94% - 100%)      15 Sep 2017 07:01  -  16 Sep 2017 07:00  --------------------------------------------------------  IN:    IV PiggyBack: 600 mL    lactated ringers.: 900 mL    Solution: 100 mL  Total IN: 1600 mL    OUT:    Chest Tube: 1295 mL    Voided: 775 mL  Total OUT: 2070 mL    Total NET: -470 mL      16 Sep 2017 07:01  -  16 Sep 2017 20:01  --------------------------------------------------------  IN:    IV PiggyBack: 150 mL    lactated ringers.: 150 mL    Vital HN: 60 mL  Total IN: 360 mL    OUT:    Chest Tube: 130 mL    Voided: 450 mL  Total OUT: 580 mL    Total NET: -220 mL            ** Labs **                          8.4    10.8  )-----------( 324      ( 16 Sep 2017 04:56 )             25.2     16 Sep 2017 04:56    141    |  106    |  33     ----------------------------<  76     4.3     |  22     |  0.74     Ca    9.2        16 Sep 2017 04:56  Phos  3.9       16 Sep 2017 04:56  Mg     2.3       16 Sep 2017 04:56    TPro  7.0    /  Alb  2.3    /  TBili  0.5    /  DBili  x      /  AST  35     /  ALT  36     /  AlkPhos  215    14 Sep 2017 23:47    PT/INR - ( 16 Sep 2017 04:56 )   PT: 15.9 sec;   INR: 1.46 ratio         PTT - ( 16 Sep 2017 04:56 )  PTT:29.0 sec  CAPILLARY BLOOD GLUCOSE            LIVER FUNCTIONS - ( 14 Sep 2017 23:47 )  Alb: 2.3 g/dL / Pro: 7.0 g/dL / ALK PHOS: 215 U/L / ALT: 36 U/L RC / AST: 35 U/L / GGT: x               MEDICATIONS  (STANDING):  enoxaparin Injectable 40 milliGRAM(s) SubCutaneous daily  ALBUTerol/ipratropium for Nebulization 3 milliLiter(s) Nebulizer every 6 hours  influenza   Vaccine 0.5 milliLiter(s) IntraMuscular once  cefepime  IVPB 1000 milliGRAM(s) IV Intermittent every 12 hours  metroNIDAZOLE  IVPB 500 milliGRAM(s) IV Intermittent every 8 hours  vancomycin  IVPB 750 milliGRAM(s) IV Intermittent every 12 hours  polyethylene glycol 3350 17 Gram(s) Oral daily  fosphenytoin IVPB 100 milliGRAM(s) PE IV Intermittent every 24 hours  furosemide   Injectable 10 milliGRAM(s) IV Push daily  cyanocobalamin Injectable 500 MICROGram(s) SubCutaneous daily  pyridoxine Injectable 100 milliGRAM(s) IV Push daily  folic acid Injectable 1 milliGRAM(s) IV Push daily    MEDICATIONS  (PRN):  labetalol Injectable 10 milliGRAM(s) IV Push every 1 hour PRN Systolic blood pressure >160

## 2017-09-16 NOTE — PROGRESS NOTE ADULT - ATTENDING COMMENTS
Evaluated in AM round  Off pressure, continue midodrine, gentle hydration, course of multiple abx  Clears as per surgery  Still waiting for a family meeting, palliative care reconsult  Lactulose, rifaximin, neuro status unchanged Evaluated in AM round  Hypoxemia resolved with lt thoracentesis, > 1250 cc drained so far, likely exudate, on abx  Will add small dose lasix  Follow cultures  Start tube feed

## 2017-09-16 NOTE — PROGRESS NOTE ADULT - ASSESSMENT
82 M presents with non-functioning J tube and SOB. 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 Dyer and Dr. Christian Weinstein on 8/14/17. After surgery, patient was found to have an anastomotic leak and was treated conservatively with NPO, antibiotics, chest tube drainage, and J tube feeding. Patient was eventually discharged to rehab. On day of evaluation, patient's J tube was unable to be used, and was sent to the ED for evaluation. Now w/ left pleural effusion and leukocytosis on admission.  S/p thoracentesis.    Blood culture w/ CoNS, likely contaminant.      1. Pt on empiric antibiotics w/ cefepime, flagyl, vanco.  Okay to continue for now. If cultures negative, can d/c.  2. Check vanco level pre-4th dose please.  3. D/w SICU, other care per SICU.

## 2017-09-16 NOTE — PROGRESS NOTE ADULT - ASSESSMENT
82 M presents with non-functioning J tube and SOB.  Pt diagnosed with esophageal adenocarcinoma s/p tx with chemo and radiation therapy. 8/14/17 Patient  underwent a robotic-assisted [place]-Trent esophagogastrectomy with feeding J tube placement for esophageal adenocarcinoma (T1aN0). Pt with esophageal leak-R chest tube-suction bulb. P/W non function JTube-flushed-now working. Dyspnea, hypoxia,leukocytosis, Left pleural effusion with concern for esophageal leak.  Status post pigtail catheter placement and currently feeling much better.

## 2017-09-16 NOTE — PROGRESS NOTE ADULT - ASSESSMENT
82M w/ respiratory distress secondary to worsened left pleural effusion, s/p [place]-Trent, complicated by anastomotic leak    Plan:  - Monitor respiratory status  - Check status of J tube  - Swallow study Monday

## 2017-09-16 NOTE — PROGRESS NOTE ADULT - ASSESSMENT
called dr. byrd/ dheeraj to see patient for medicine.  patient with pleural effusion, shortness of breath signficantly improved.  recommend barrium test when stable

## 2017-09-16 NOTE — CONSULT NOTE ADULT - SUBJECTIVE AND OBJECTIVE BOX
82 M presents with non-functioning J tube and SOB.  Pt diagnosed with esophageal adenocarcinoma s/p tx with chemo and radiation therapy. 8/14/17   Patient  underwent a robotic-assisted [place] esophagogastrectomy with feeding J tube placement for esophageal adenocarcinoma (T1aN0) with Dr. Dyer and Dr. Christian Weinstein. After surgery, patient was found to have an anastomotic leak and was treated conservatively with NPO, jtube feeds, antibiotics, R chest tube placement-eventually transitioned to suction bulb,. Patient was eventually discharged to rehab on 8/27.  then ent to the ED for evaluation  for  SOB, but no chest pain or abdominal pain.  Right CT reported to be draining  brown fluid, with elsy bulb not inflating, Ct chest was obtained with increase in size of Left pleural effusion. Pt seen in ER anxious, dyspneic on 100% nrb, also with wbc 16k.  Called to eval + anxiety, + sob, -cough, -cp, -pleurtic cp, - fever, -n/v/d, neg leg pain-reported that pt had increased oxygen requirements requiring high liter flow.  Had a pigtail catheter placed  and is no longer markedly hypoxemic.  The patient remains in the SICU and is sitting in the chair.  Awake and alert - denies complaint  wife  at  bedside    REVIEW OF SYSTEMS:    CONSTITUTIONAL: No weakness, fevers or chills  EYES/ENT: No visual changes;  No vertigo or throat pain   NECK: No pain or stiffness  RESPIRATORY: No cough, wheezing, hemoptysis; No shortness of breath  CARDIOVASCULAR: No chest pain or palpitations  GASTROINTESTINAL: No abdominal or epigastric pain. No nausea, vomiting, or hematemesis; No diarrhea or constipation. No melena or hematochezia.  GENITOURINARY: No dysuria, frequency or hematuria  NEUROLOGICAL: No numbness or weakness  SKIN: No itching, rashes  PHYSICAL EXAMINATION:  Vital Signs Last 24 Hrs  T(C): 36.4 (16 Sep 2017 07:00), Max: 36.6 (15 Sep 2017 16:00)  T(F): 97.6 (16 Sep 2017 07:00), Max: 97.8 (15 Sep 2017 16:00)  HR: 91 (16 Sep 2017 12:00) (80 - 98)  BP: 97/52 (16 Sep 2017 12:00) (96/50 - 140/83)  BP(mean): 72 (16 Sep 2017 12:00) (69 - 105)  RR: 27 (16 Sep 2017 12:00) (18 - 39)  SpO2: 100% (16 Sep 2017 12:00) (94% - 100%)  CAPILLARY BLOOD GLUCOSE          09-15 @ 07:01  -  09-16 @ 07:00  --------------------------------------------------------  IN: 1600 mL / OUT: 2070 mL / NET: -470 mL    09-16 @ 07:01  -  09-16 @ 12:54  --------------------------------------------------------  IN: 210 mL / OUT: 350 mL / NET: -140 mL        GENERAL: NAD, well-groomed, well-developed  HEAD:  atraumatic, normocephalic  EYES: sclera anicteric  ENMT: mucous membranes moist  NECK: supple, No JVD  CHEST/LUNG:   b/l chest tubes.  decersed  breath  sounds, right base  HEART: normal S1, S2  ABDOMEN: BS+, soft, ND, NT   EXTREMITIES:  pulses palpable; no clubbing, cyanosis, or edema b/l LEs  NEURO: awake, alert, interactive; moves all extremities  SKIN: no rashes or lesions      LABS:                        8.4    10.8  )-----------( 324      ( 16 Sep 2017 04:56 )             25.2     09-16    141  |  106  |  33<H>  ----------------------------<  76  4.3   |  22  |  0.74    Ca    9.2      16 Sep 2017 04:56  Phos  3.9     09-16  Mg     2.3     09-16    TPro  7.0  /  Alb  2.3<L>  /  TBili  0.5  /  DBili  x   /  AST  35  /  ALT  36  /  AlkPhos  215<H>  09-14    PT/INR - ( 16 Sep 2017 04:56 )   PT: 15.9 sec;   INR: 1.46 ratio         PTT - ( 16 Sep 2017 04:56 )  PTT:29.0 sec  Urinalysis Basic - ( 15 Sep 2017 05:59 )    Color: Yellow / Appearance: Clear / SG: >1.030 / pH: x  Gluc: x / Ketone: Negative  / Bili: Negative / Urobili: 2   Blood: x / Protein: 30 mg/dL / Nitrite: Negative   Leuk Esterase: Negative / RBC: x / WBC 0-2 /HPF   Sq Epi: x / Non Sq Epi: x / Bacteria: x          RADIOLOGY & ADDITIONAL TESTS:

## 2017-09-16 NOTE — PROGRESS NOTE ADULT - SUBJECTIVE AND OBJECTIVE BOX
HISTORY  MEJIA LATIF is 82y M, s/p esophagogastrectomy (partial) with intra-thoracic esophagogastric anastomosis ([place]-T[place] procedure) on 8/14/17 - post-op complicated by anastomotic leak, discharged to [facility] on 8/27 with R chest drain in place. He was admitted from rehab overnight with clogged jejunostomy feeding tube. Patient reports ongoing SOB, states that he has not required oxygen while at rehab. Denies fevers/chills. Per patient, drainage from R chest has been minimal, murky brown fluid. The drain's bulb has been difficult to keep to self-suction, as it fills with air quickly. Patient reports passage of flatus and stool.  Patient was taking medications by mouth and was only getting nutrition via his J tube.   Of note, patient is on prednisone 5 mg daily for rheumatoid arthritis.    24 HOUR EVENTS: placed L pigtail to drain effusion     SUBJECTIVE/ROS:  [x] A ten-point review of systems was otherwise negative except as noted.  [ ] Due to altered mental status/intubation, subjective information were not able to be obtained from the patient. History was obtained, to the extent possible, from review of the chart and collateral sources of information.    NEURO  RASS: 0   GCS:     CAM ICU:  Exam: awake, alert, oriented  Meds: phenytoin   Suspension 100 milliGRAM(s) Enteral Tube two times a day    [x] Adequacy of sedation and pain control has been assessed and adjusted    RESPIRATORY  RR: 23 (09-16-17 @ 05:00) (18 - 39)  SpO2: 98% (09-16-17 @ 05:00) (97% - 100%)  Wt(kg): --  Exam: unlabored, clear to auscultation bilaterally  Mechanical Ventilation: N/A  CT R, pigtail L    [N/A] Extubation Readiness Assessed  Meds: ALBUTerol/ipratropium for Nebulization 3 milliLiter(s) Nebulizer every 6 hours  montelukast 10 milliGRAM(s) Oral daily    CARDIOVASCULAR  HR: 87 (09-16-17 @ 05:00) (78 - 98)  BP: 125/60 (09-16-17 @ 05:00) (96/50 - 138/61)  BP(mean): 86 (09-16-17 @ 05:00) (69 - 94)  ABP: --  ABP(mean): --  Wt(kg): --  CVP(cm H2O): --  VBG - ( 16 Sep 2017 04:53 )  pH: 7.41  /  pCO2: 41    /  pO2: 30    / HCO3: 25    / Base Excess: 1.0   /  SaO2: 49     Lactate: 0.7    Exam: regular rate and rhythm  Cardiac Rhythm: sinus  Perfusion     [x]Adequate   [ ]Inadequate  Mentation   [x]Normal       [ ]Reduced  Extremities  [x]Warm         [ ]Cool  Volume Status [ ]Hypervolemic [x]Euvolemic [ ]Hypovolemic  Meds: losartan 50 milliGRAM(s) Enteral Tube daily  amLODIPine   Tablet 5 milliGRAM(s) Oral daily      GI/NUTRITION  Exam: soft, nontender, nondistended, incision C/D/I  Diet: NPO, mIVF @75  Meds: polyethylene glycol 3350 17 Gram(s) Oral daily      GENITOURINARY  I&O's Detail    09-15 @ 07:01  -  09-16 @ 05:17  --------------------------------------------------------  IN:    IV PiggyBack: 600 mL    lactated ringers.: 825 mL    Solution: 100 mL  Total IN: 1525 mL    OUT:    Chest Tube: 1090 mL    Voided: 675 mL  Total OUT: 1765 mL    Total NET: -240 mL        Weight (kg): 63.8 (09-15 @ 16:00)  09-15    143  |  105  |  42<H>  ----------------------------<  85  4.6   |  25  |  0.83    Ca    9.1      15 Sep 2017 05:56    TPro  7.0  /  Alb  2.3<L>  /  TBili  0.5  /  DBili  x   /  AST  35  /  ALT  36  /  AlkPhos  215<H>  09-14    [ ] López catheter, indication: N/A  Meds: lactated ringers. 1000 milliLiter(s) IV Continuous <Continuous>  pyridoxine 100 milliGRAM(s) Oral daily  folic acid 0.4 milliGRAM(s) Enteral Tube daily  cyanocobalamin 500 MICROGram(s) Oral daily    HEMATOLOGIC  Meds: enoxaparin Injectable 40 milliGRAM(s) SubCutaneous daily    [x] VTE Prophylaxis                        8.4    10.8  )-----------( 324      ( 16 Sep 2017 04:56 )             25.2     PT/INR - ( 16 Sep 2017 04:56 )   PT: 15.9 sec;   INR: 1.46 ratio         PTT - ( 16 Sep 2017 04:56 )  PTT:29.0 sec  Transfusion     [ ] PRBC   [ ] Platelets   [ ] FFP   [ ] Cryoprecipitate    INFECTIOUS DISEASES  WBC Count: 10.8 K/uL (09-16 @ 04:56)  WBC Count: see note K/uL (09-16 @ 03:01)  WBC Count: 13.7 K/uL (09-15 @ 14:54)  WBC Count: 15.3 K/uL (09-15 @ 05:56)    RECENT CULTURES:    Meds: influenza   Vaccine 0.5 milliLiter(s) IntraMuscular once  cefepime  IVPB 1000 milliGRAM(s) IV Intermittent every 12 hours  metroNIDAZOLE  IVPB 500 milliGRAM(s) IV Intermittent every 8 hours  vancomycin  IVPB 750 milliGRAM(s) IV Intermittent every 12 hours    ENDOCRINE  CAPILLARY BLOOD GLUCOSE    Meds: finasteride 5 milliGRAM(s) Oral daily  simvastatin 40 milliGRAM(s) Oral at bedtime    ACCESS DEVICES:  [ ] Peripheral IV  [ ] Central Venous Line	[ ] R	[ ] L	[ ] IJ	[ ] Fem	[ ] SC	Placed:   [ ] Arterial Line		[ ] R	[ ] L	[ ] Fem	[ ] Rad	[ ] Ax	Placed:   [ ] PICC:					[ ] Mediport  [x] Urinary Catheter, Date Placed:   [x] Necessity of urinary, arterial, and venous catheters discussed  [x] R chest tube  [x] L pigtail    OTHER MEDICATIONS:      CODE STATUS:      IMAGING: HISTORY  MEJIA LATIF is 82y M, s/p esophagogastrectomy (partial) with intra-thoracic esophagogastric anastomosis ([place]-Trent procedure) on 8/14/17 - post-op complicated by anastomotic leak, discharged to [facility] on 8/27 with R chest drain in place. He was admitted from rehab overnight with clogged jejunostomy feeding tube. Patient reports ongoing SOB, states that he has not required oxygen while at rehab. Denies fevers/chills. Per patient, drainage from R chest has been minimal, murky brown fluid. The drain's bulb has been difficult to keep to self-suction, as it fills with air quickly. Patient reports passage of flatus and stool.  Patient was taking medications by mouth and was only getting nutrition via his J tube.   Of note, patient is on prednisone 5 mg daily for rheumatoid arthritis.    24 HOUR EVENTS: placed L pigtail to drain effusion     SUBJECTIVE/ROS:  [x] A ten-point review of systems was otherwise negative except as noted.  [ ] Due to altered mental status/intubation, subjective information were not able to be obtained from the patient. History was obtained, to the extent possible, from review of the chart and collateral sources of information.    NEURO  RASS: 0   GCS:     CAM ICU:  Exam: awake, alert, oriented  Meds: phenytoin   Suspension 100 milliGRAM(s) Enteral Tube two times a day    [x] Adequacy of sedation and pain control has been assessed and adjusted    RESPIRATORY  RR: 23 (09-16-17 @ 05:00) (18 - 39)  SpO2: 98% (09-16-17 @ 05:00) (97% - 100%)  Wt(kg): --  Exam: unlabored, clear to auscultation bilaterally  Mechanical Ventilation: N/A  CT R, pigtail L    [N/A] Extubation Readiness Assessed  Meds: ALBUTerol/ipratropium for Nebulization 3 milliLiter(s) Nebulizer every 6 hours  montelukast 10 milliGRAM(s) Oral daily    CARDIOVASCULAR  HR: 87 (09-16-17 @ 05:00) (78 - 98)  BP: 125/60 (09-16-17 @ 05:00) (96/50 - 138/61)  BP(mean): 86 (09-16-17 @ 05:00) (69 - 94)  ABP: --  ABP(mean): --  Wt(kg): --  CVP(cm H2O): --  VBG - ( 16 Sep 2017 04:53 )  pH: 7.41  /  pCO2: 41    /  pO2: 30    / HCO3: 25    / Base Excess: 1.0   /  SaO2: 49     Lactate: 0.7    Exam: regular rate and rhythm  Cardiac Rhythm: sinus  Perfusion     [x]Adequate   [ ]Inadequate  Mentation   [x]Normal       [ ]Reduced  Extremities  [x]Warm         [ ]Cool  Volume Status [ ]Hypervolemic [x]Euvolemic [ ]Hypovolemic  Meds: losartan 50 milliGRAM(s) Enteral Tube daily  amLODIPine   Tablet 5 milliGRAM(s) Oral daily      GI/NUTRITION  Exam: soft, nontender, nondistended, incision C/D/I  Diet: NPO, mIVF @75  Meds: polyethylene glycol 3350 17 Gram(s) Oral daily      GENITOURINARY    I&O's Detail    15 Sep 2017 07:01  -  16 Sep 2017 07:00  --------------------------------------------------------  IN:    IV PiggyBack: 600 mL    lactated ringers.: 900 mL    Solution: 100 mL  Total IN: 1600 mL    OUT:    Chest Tube: 1295 mL    Voided: 775 mL  Total OUT: 2070 mL    Total NET: -470 mL      16 Sep 2017 07:01  -  16 Sep 2017 08:27  --------------------------------------------------------  IN:    lactated ringers.: 75 mL  Total IN: 75 mL    OUT:  Total OUT: 0 mL    Total NET: 75 mL            Weight (kg): 63.8 (09-15 @ 16:00)  09-15    143  |  105  |  42<H>  ----------------------------<  85  4.6   |  25  |  0.83    Ca    9.1      15 Sep 2017 05:56    TPro  7.0  /  Alb  2.3<L>  /  TBili  0.5  /  DBili  x   /  AST  35  /  ALT  36  /  AlkPhos  215<H>  09-14    [ ] López catheter, indication: N/A  Meds: lactated ringers. 1000 milliLiter(s) IV Continuous <Continuous>  pyridoxine 100 milliGRAM(s) Oral daily  folic acid 0.4 milliGRAM(s) Enteral Tube daily  cyanocobalamin 500 MICROGram(s) Oral daily    HEMATOLOGIC  Meds: enoxaparin Injectable 40 milliGRAM(s) SubCutaneous daily    [x] VTE Prophylaxis                        8.4    10.8  )-----------( 324      ( 16 Sep 2017 04:56 )             25.2     PT/INR - ( 16 Sep 2017 04:56 )   PT: 15.9 sec;   INR: 1.46 ratio         PTT - ( 16 Sep 2017 04:56 )  PTT:29.0 sec  Transfusion     [ ] PRBC   [ ] Platelets   [ ] FFP   [ ] Cryoprecipitate    INFECTIOUS DISEASES  WBC Count: 10.8 K/uL (09-16 @ 04:56)  WBC Count: see note K/uL (09-16 @ 03:01)  WBC Count: 13.7 K/uL (09-15 @ 14:54)  WBC Count: 15.3 K/uL (09-15 @ 05:56)    RECENT CULTURES:    Meds: influenza   Vaccine 0.5 milliLiter(s) IntraMuscular once  cefepime  IVPB 1000 milliGRAM(s) IV Intermittent every 12 hours  metroNIDAZOLE  IVPB 500 milliGRAM(s) IV Intermittent every 8 hours  vancomycin  IVPB 750 milliGRAM(s) IV Intermittent every 12 hours    ENDOCRINE  CAPILLARY BLOOD GLUCOSE    Meds: finasteride 5 milliGRAM(s) Oral daily  simvastatin 40 milliGRAM(s) Oral at bedtime    ACCESS DEVICES:  [ ] Peripheral IV  [ ] Central Venous Line	[ ] R	[ ] L	[ ] IJ	[ ] Fem	[ ] SC	Placed:   [ ] Arterial Line		[ ] R	[ ] L	[ ] Fem	[ ] Rad	[ ] Ax	Placed:   [ ] PICC:					[ ] Mediport  [x] Urinary Catheter, Date Placed:   [x] Necessity of urinary, arterial, and venous catheters discussed  [x] R chest tube  [x] L pigtail    OTHER MEDICATIONS:      CODE STATUS:      IMAGING:

## 2017-09-16 NOTE — PROGRESS NOTE ADULT - ASSESSMENT
82M w/ respiratory distress secondary to worsened left pleural effusion, s/p [place], complicated by anastomotic leak    Plan:  - Monitor respiratory status  - Check status of J tube  - Swallow study Monday

## 2017-09-16 NOTE — PROGRESS NOTE ADULT - SUBJECTIVE AND OBJECTIVE BOX
PULMONARY CONSULT    Initial HPI on admission:  HPI:  82 M presents with non-functioning J tube and SOB.  Pt diagnosed with esophageal adenocarcinoma s/p tx with chemo and radiation therapy. 8/14/17 Patient  underwent a robotic-assisted [place] esophagogastrectomy with feeding J tube placement for esophageal adenocarcinoma (T1aN0) with Dr. Dyer and Dr. Christian Weinstein. After surgery, patient was found to have an anastomotic leak and was treated conservatively with NPO, jtube feeds, antibiotics, R chest tube placement-eventually transitioned to suction bulb,. Patient was eventually discharged to rehab on 8/27. On day of evaluation, patient's J tube was unable to be used, and was sent to the ED for evaluation. Patient also complains of SOB, but no chest pain or abdominal pain.  Right CT reported to be draining murky brown fluid, with elsy bulb not inflating, Ct chest was obtained with increase in size of Left pleural effusion. Pt seen in ER anxious, dyspneic on 100% nrb, also with wbc 16k.  Called to eval + anxiety, + sob, -cough, -cp, -pleurtic cp, - fever, -n/v/d, neg leg pain-reported that pt had increased oxygen requirements requiring high liter flow.  Had a pigtail catheter placed yesterday and is no longer markedly hypoxemic.  The patient remains in the SICU and is sitting in the chair.  Awake and alert - denies complaint    PAST MEDICAL & SURGICAL HISTORY:  Coronary artery disease: s/p 3 stents on June 30, 2017 at [facility] Dr. Lavelle Reid  OA (osteoarthritis) of knee: right  Former smoker, stopped smoking in distant past  Rheumatoid arthritis  Seizure disorder: 1 episode approximately 15 yrs ago  Asthma  centrilobar emphysema  Hyperlipidemia  BPH (benign prostatic hyperplasia)  HTN (hypertension)  Esophagus cancer  Chronic allergic rhinitis  BCC (basal cell carcinoma): skin  Cerebrovascular accident (CVA)  Anxiety  History of tonsillectomy  H/O heart artery stent: June 30, 2017  Knee arthropathy: left  History of total hip replacement: left  History of hernia repair    Allergies    penicillin (Rash)      FAMILY HISTORY:  Family history of heart attack (Sibling)  Family history of pulmonary embolism: father @ 49 yr old  FH: CAD (coronary artery disease)    Social history: former heavy smoker, quit 50 yrs ago, s/p 2ppd x 20yrs    Review of Systems:  CONSTITUTIONAL: No fever, chills, or fatigue  EYES: No eye pain, visual disturbances, or discharge  ENMT:  No difficulty hearing, tinnitus, vertigo; No sinus or throat pain  NECK: No pain or stiffness  RESPIRATORY: Per above  CARDIOVASCULAR: No chest pain, palpitations, dizziness, or leg swelling  GASTROINTESTINAL: No abdominal or epigastric pain. No nausea, vomiting, or hematemesis; No diarrhea or constipation. No melena or hematochezia.  GENITOURINARY: No dysuria, frequency, hematuria, or incontinence  NEUROLOGICAL: No headaches, memory loss, loss of strength, numbness, or tremors  SKIN: No itching, burning, rashes, or lesions   MUSCULOSKELETAL: No joint pain or swelling; No muscle, back, or extremity pain  PSYCHIATRIC: No depression, mood swings, or difficulty sleeping, + anxiety      Medications:  MEDICATIONS  (STANDING):  lactated ringers. 1000 milliLiter(s) (75 mL/Hr) IV Continuous <Continuous>  enoxaparin Injectable 40 milliGRAM(s) SubCutaneous daily  finasteride 5 milliGRAM(s) Oral daily  losartan 50 milliGRAM(s) Oral daily  tamsulosin 0.4 milliGRAM(s) Oral at bedtime  phenytoin   Capsule 100 milliGRAM(s) Oral two times a day  simvastatin 40 milliGRAM(s) Oral at bedtime  pyridoxine 100 milliGRAM(s) Oral daily  folic acid 0.4 milliGRAM(s) Oral daily  cyanocobalamin 500 MICROGram(s) Oral daily  montelukast 10 milliGRAM(s) Oral daily  docusate sodium 100 milliGRAM(s) Oral two times a day  amLODIPine   Tablet 5 milliGRAM(s) Oral daily    MEDICATIONS  (PRN):  ALBUTerol    90 MICROgram(s) HFA Inhaler 2 Puff(s) Inhalation every 6 hours PRN Shortness of Breath and/or Wheezing    Vital Signs Last 24 Hrs  T(C): 36.7 (15 Sep 2017 10:57), Max: 36.8 (14 Sep 2017 21:59)  T(F): 98 (15 Sep 2017 10:57), Max: 98.3 (14 Sep 2017 21:59)  HR: 97 (15 Sep 2017 10:57) (78 - 102)  BP: 116/75 (15 Sep 2017 10:57) (85/50 - 116/75)  BP(mean): --  RR: 18 (15 Sep 2017 10:57) (18 - 18)  SpO2: 98% (15 Sep 2017 10:57) (95% - 99%)      VBG pH 7.41 09-15 @ 01:02  VBG pCO2 43 09-15 @ 01:02  VBG O2 sat 36 09-15 @ 01:02  VBG lactate 0.7 09-15 @ 01:02    LABS:                        8.2    15.3  )-----------( 362      ( 15 Sep 2017 05:56 )             25.2     09-15    143  |  105  |  42<H>  ----------------------------<  85  4.6   |  25  |  0.83    Ca    9.1      15 Sep 2017 05:56    TPro  7.0  /  Alb  2.3<L>  /  TBili  0.5  /  DBili  x   /  AST  35  /  ALT  36  /  AlkPhos  215<H>  09-14          CAPILLARY BLOOD GLUCOSE    Urinalysis Basic - ( 15 Sep 2017 05:59 )    Color: Yellow / Appearance: Clear / SG: >1.030 / pH: x  Gluc: x / Ketone: Negative  / Bili: Negative / Urobili: 2   Blood: x / Protein: 30 mg/dL / Nitrite: Negative   Leuk Esterase: Negative / RBC: x / WBC 0-2 /HPF   Sq Epi: x / Non Sq Epi: x / Bacteria: x        CULTURES: pending        Physical Examination:  a/o x3, + anxiety, o2 sat 99% on 100% nrb  General: No acute distress. + anxiety     HEENT: Pupils equal, reactive to light.  Symmetric.    PULM: decreased bs bilat L>R, R chest tube to suction bulb, no significant sputum production    CVS: S1, S2    ABD: Soft, nondistended, nontender, normoactive bowel sounds, no masses    EXT: No edema, nontender    SKIN: Warm and well perfused, no rashes noted.    NEURO: Alert, oriented, interactive, nonfocal, a/0 x3 maex4    RADIOLOGY REVIEWED  CXR: prelim/ increased R effusion    CT chest:< from: CT Angio Chest w/ IV Cont (09.15.17 @ 00:51) >    IMPRESSION:     No pulmonary embolism.    Status post distal esophagectomy with gastric pull-through.   Evaluation   for persistent leak is limited on this examination without contrast.    Moderate/large left pleural effusion, increased in size since 8/26/2017.   Stable small/moderate loculated right pleural effusion with a right-sided   chest tube in place.    Small pericardial effusion is new since 08/26/2017.    Mild subcutaneous edema.      < from: Xray Esophagram (08.21.17 @ 11:39) >  IMPRESSION:      1. Leak in the region of the greater curvature at the level of the   cardioesophageal junction.  2. Contrast is also seen pooling in alinear fashion in the left upper   esophagus, this could represent a contained leak versus redundant   pull-through stomach.

## 2017-09-16 NOTE — PROGRESS NOTE ADULT - SUBJECTIVE AND OBJECTIVE BOX
Subjective: Pt states" " Denies any CP, SOB, palpitations. No acute events overnight.    Vital Signs:  Vital Signs Last 24 Hrs  T(C): 36.4 (09-16-17 @ 07:00), Max: 36.6 (09-15-17 @ 16:00)  T(F): 97.6 (09-16-17 @ 07:00), Max: 97.8 (09-15-17 @ 16:00)  HR: 93 (09-16-17 @ 13:00) (80 - 98)  BP: 120/61 (09-16-17 @ 13:00) (96/50 - 140/83)  RR: 29 (09-16-17 @ 13:00) (18 - 39)  SpO2: 100% (09-16-17 @ 13:00) (94% - 100%) on (O2)        Relevant labs, radiology and Medications reviewed                        8.4    10.8  )-----------( 324      ( 16 Sep 2017 04:56 )             25.2     09-16    141  |  106  |  33<H>  ----------------------------<  76  4.3   |  22  |  0.74    Ca    9.2      16 Sep 2017 04:56  Phos  3.9     09-16  Mg     2.3     09-16    TPro  7.0  /  Alb  2.3<L>  /  TBili  0.5  /  DBili  x   /  AST  35  /  ALT  36  /  AlkPhos  215<H>  09-14    PT/INR - ( 16 Sep 2017 04:56 )   PT: 15.9 sec;   INR: 1.46 ratio         PTT - ( 16 Sep 2017 04:56 )  PTT:29.0 sec  MEDICATIONS  (STANDING):  enoxaparin Injectable 40 milliGRAM(s) SubCutaneous daily  ALBUTerol/ipratropium for Nebulization 3 milliLiter(s) Nebulizer every 6 hours  influenza   Vaccine 0.5 milliLiter(s) IntraMuscular once  cefepime  IVPB 1000 milliGRAM(s) IV Intermittent every 12 hours  metroNIDAZOLE  IVPB 500 milliGRAM(s) IV Intermittent every 8 hours  vancomycin  IVPB 750 milliGRAM(s) IV Intermittent every 12 hours  phenytoin   Suspension 100 milliGRAM(s) Enteral Tube two times a day  finasteride 5 milliGRAM(s) Oral daily  losartan 50 milliGRAM(s) Enteral Tube daily  simvastatin 40 milliGRAM(s) Oral at bedtime  pyridoxine 100 milliGRAM(s) Oral daily  folic acid 0.4 milliGRAM(s) Enteral Tube daily  cyanocobalamin 500 MICROGram(s) Oral daily  montelukast 10 milliGRAM(s) Oral daily  amLODIPine   Tablet 5 milliGRAM(s) Oral daily  polyethylene glycol 3350 17 Gram(s) Oral daily  furosemide    Tablet 20 milliGRAM(s) Oral daily    MEDICATIONS  (PRN):      I&O's Summary    15 Sep 2017 07:01  -  16 Sep 2017 07:00  --------------------------------------------------------  IN: 1600 mL / OUT: 2070 mL / NET: -470 mL    16 Sep 2017 07:01  -  16 Sep 2017 14:05  --------------------------------------------------------  IN: 210 mL / OUT: 350 mL / NET: -140 mL        IMAGING    CXR:    CT Chest:    PAST MEDICAL & SURGICAL HISTORY:  Coronary artery disease: s/p 3 stents on June 30, 2017 at [facility] Dr. Lavelle Reid  OA (osteoarthritis) of knee: right  Former smoker, stopped smoking in distant past  Rheumatoid arthritis  Seizure disorder: 1 episode approximately 15 yrs ago  Asthma  Hyperlipidemia  BPH (benign prostatic hyperplasia)  HTN (hypertension)  Esophagus cancer  Chronic allergic rhinitis  BCC (basal cell carcinoma): skin  Cerebrovascular accident (CVA)  Anxiety  History of tonsillectomy  H/O heart artery stent: June 30, 2017  Knee arthropathy: left  History of total hip replacement: left  History of hernia repair       Physical Exam:  Neurology: A&Ox3, nonfocal, MAI x 4, NAD  Respiratory: B/L BS CTA, diminished at bases, No wheezing, rales, rhonchi  CV: RRR, S1S2

## 2017-09-16 NOTE — PROGRESS NOTE ADULT - SUBJECTIVE AND OBJECTIVE BOX
Patient is a 82y old  Male who presents with a chief complaint of feeding tube clogged s/p maryjane ford (15 Sep 2017 16:33)      SUBJECTIVE: Patient seen and examined on blue team morning and afternoon rounds. Respiratory status has improved. No nausea/vomiting. Pain is controlled.    MEDICATIONS  (STANDING):  enoxaparin Injectable 40 milliGRAM(s) SubCutaneous daily  ALBUTerol/ipratropium for Nebulization 3 milliLiter(s) Nebulizer every 6 hours  influenza   Vaccine 0.5 milliLiter(s) IntraMuscular once  cefepime  IVPB 1000 milliGRAM(s) IV Intermittent every 12 hours  metroNIDAZOLE  IVPB 500 milliGRAM(s) IV Intermittent every 8 hours  vancomycin  IVPB 750 milliGRAM(s) IV Intermittent every 12 hours  polyethylene glycol 3350 17 Gram(s) Oral daily  fosphenytoin IVPB 100 milliGRAM(s) PE IV Intermittent every 24 hours  furosemide   Injectable 10 milliGRAM(s) IV Push daily  cyanocobalamin Injectable 500 MICROGram(s) SubCutaneous daily  pyridoxine Injectable 100 milliGRAM(s) IV Push daily  folic acid Injectable 1 milliGRAM(s) IV Push daily  dextrose 5% + sodium chloride 0.9%. 1000 milliLiter(s) (100 mL/Hr) IV Continuous <Continuous>    MEDICATIONS  (PRN):  labetalol Injectable 10 milliGRAM(s) IV Push every 1 hour PRN Systolic blood pressure >160    Vital Signs Last 24 Hrs  T(C): 36.8 (16 Sep 2017 19:00), Max: 36.8 (16 Sep 2017 19:00)  T(F): 98.2 (16 Sep 2017 19:00), Max: 98.2 (16 Sep 2017 19:00)  HR: 93 (16 Sep 2017 22:00) (80 - 97)  BP: 123/58 (16 Sep 2017 22:00) (97/52 - 140/83)  BP(mean): 83 (16 Sep 2017 22:00) (69 - 105)  RR: 25 (16 Sep 2017 22:00) (23 - 30)  SpO2: 100% (16 Sep 2017 22:00) (94% - 100%)    Gen: Alert, NAD.  Chest tubes in place. Unlabored breathing.  ABD =SOFT, BS+  EXT - trace edema+  Neuro - alert , awake       LABS:  09-16    141  |  106  |  33<H>  ----------------------------<  76  4.3   |  22  |  0.74    Ca    9.2      16 Sep 2017 04:56  Phos  3.9     09-16  Mg     2.3     09-16    TPro  7.0  /  Alb  2.3<L>  /  TBili  0.5  /  DBili      /  AST  35  /  ALT  36  /  AlkPhos  215<H>  09-14    Creatinine Trend: 0.74 <--, 0.83 <--, 0.87 <--, 0.61 <--                        8.4    10.8  )-----------( 324      ( 16 Sep 2017 04:56 )             25.2     Urine Studies:  Urinalysis Basic - ( 15 Sep 2017 05:59 )    Color: Yellow / Appearance: Clear / SG: >1.030 / pH:   Gluc:  / Ketone: Negative  / Bili: Negative / Urobili: 2   Blood:  / Protein: 30 mg/dL / Nitrite: Negative   Leuk Esterase: Negative / RBC:  / WBC 0-2 /HPF   Sq Epi:  / Non Sq Epi:  / Bacteria:               LIVER FUNCTIONS - ( 14 Sep 2017 23:47 )  Alb: 2.3 g/dL / Pro: 7.0 g/dL / ALK PHOS: 215 U/L / ALT: 36 U/L RC / AST: 35 U/L / GGT: x           PT/INR - ( 16 Sep 2017 04:56 )   PT: 15.9 sec;   INR: 1.46 ratio         PTT - ( 16 Sep 2017 04:56 )  PTT:29.0 sec

## 2017-09-16 NOTE — PROGRESS NOTE ADULT - ASSESSMENT
ASSESSMENT: 82yMale with 2y M, s/p robot assisted [place]-Trent, possible persistent anastomotic leak, possible infected pleural effusion(s). Jejunostomy feeding tube unclogged by general surgery.     PLAN:   Neurologic:  pain control    Respiratory:  Maintain on Non-rebreather, Keep sat >90%, try to titrate to venti mask 55%  Received empiric abx in ED, hold for now until cultures return  Chest tube L to drain effusion  Send cultures of drainage for analysis, culture, cell count and cytology   Duoneb's/Albuterol PRN  AM chest xray    Cardiovascular:  Continue BP meds with hold parameters   Vital Signs Q-4    Gastrointestinal/Nutrition:  Keep NPO  J tube can be used now    Genitourinary/Renal:  Strict I's and O's   LR @75/hr  pleural effusion lytes  clarify flomax (can't be crushed- currently holding)    Hematologic:  Lovenox for DVT ppx     Infectious Disease:  antibiotics - cefepime, vanc, flagyl    Trend Leukocytosis   f/u Culture Pleural effusion    Endocrine:    Disposition: SICU

## 2017-09-17 LAB
ALBUMIN FLD-MCNC: 1.1 G/DL — SIGNIFICANT CHANGE UP
ANION GAP SERPL CALC-SCNC: 10 MMOL/L — SIGNIFICANT CHANGE UP (ref 5–17)
ANION GAP SERPL CALC-SCNC: 11 MMOL/L — SIGNIFICANT CHANGE UP (ref 5–17)
APTT BLD: 29.7 SEC — SIGNIFICANT CHANGE UP (ref 27.5–37.4)
B PERT IGG+IGM PNL SER: ABNORMAL
BASOPHILS # BLD AUTO: 0 K/UL — SIGNIFICANT CHANGE UP (ref 0–0.2)
BASOPHILS NFR BLD AUTO: 0 % — SIGNIFICANT CHANGE UP (ref 0–2)
BUN SERPL-MCNC: 22 MG/DL — SIGNIFICANT CHANGE UP (ref 7–23)
BUN SERPL-MCNC: 24 MG/DL — HIGH (ref 7–23)
CA-I BLD-SCNC: 1.28 MMOL/L — SIGNIFICANT CHANGE UP (ref 1.12–1.3)
CA-I BLD-SCNC: 1.31 MMOL/L — HIGH (ref 1.12–1.3)
CALCIUM SERPL-MCNC: 7.7 MG/DL — LOW (ref 8.4–10.5)
CALCIUM SERPL-MCNC: 9 MG/DL — SIGNIFICANT CHANGE UP (ref 8.4–10.5)
CHLORIDE SERPL-SCNC: 107 MMOL/L — SIGNIFICANT CHANGE UP (ref 96–108)
CHLORIDE SERPL-SCNC: 108 MMOL/L — SIGNIFICANT CHANGE UP (ref 96–108)
CK MB CFR SERPL CALC: 4.1 NG/ML — SIGNIFICANT CHANGE UP (ref 0–6.7)
CK SERPL-CCNC: 38 U/L — SIGNIFICANT CHANGE UP (ref 30–200)
CO2 SERPL-SCNC: 20 MMOL/L — LOW (ref 22–31)
CO2 SERPL-SCNC: 23 MMOL/L — SIGNIFICANT CHANGE UP (ref 22–31)
COLOR FLD: YELLOW — SIGNIFICANT CHANGE UP
CREAT SERPL-MCNC: 0.7 MG/DL — SIGNIFICANT CHANGE UP (ref 0.5–1.3)
CREAT SERPL-MCNC: 0.75 MG/DL — SIGNIFICANT CHANGE UP (ref 0.5–1.3)
CULTURE RESULTS: SIGNIFICANT CHANGE UP
EOSINOPHIL # BLD AUTO: 0.2 K/UL — SIGNIFICANT CHANGE UP (ref 0–0.5)
EOSINOPHIL # FLD: 2 % — SIGNIFICANT CHANGE UP
EOSINOPHIL NFR BLD AUTO: 1.1 % — SIGNIFICANT CHANGE UP (ref 0–6)
FLUID INTAKE SUBSTANCE CLASS: SIGNIFICANT CHANGE UP
FLUID SEGMENTED GRANULOCYTES: 55 % — SIGNIFICANT CHANGE UP
GAS PNL BLDV: SIGNIFICANT CHANGE UP
GLUCOSE FLD-MCNC: 117 MG/DL — SIGNIFICANT CHANGE UP
GLUCOSE SERPL-MCNC: 106 MG/DL — HIGH (ref 70–99)
GLUCOSE SERPL-MCNC: 131 MG/DL — HIGH (ref 70–99)
HCT VFR BLD CALC: 20.7 % — CRITICAL LOW (ref 39–50)
HCT VFR BLD CALC: 27.1 % — LOW (ref 39–50)
HCT VFR BLD CALC: 28 % — LOW (ref 39–50)
HCT VFR BLD CALC: 41.3 % — SIGNIFICANT CHANGE UP (ref 39–50)
HGB BLD-MCNC: 13.3 G/DL — SIGNIFICANT CHANGE UP (ref 13–17)
HGB BLD-MCNC: 6.9 G/DL — CRITICAL LOW (ref 13–17)
HGB BLD-MCNC: 9 G/DL — LOW (ref 13–17)
HGB BLD-MCNC: 9.1 G/DL — LOW (ref 13–17)
INR BLD: 1.58 RATIO — HIGH (ref 0.88–1.16)
LDH SERPL L TO P-CCNC: 210 U/L — SIGNIFICANT CHANGE UP
LYMPHOCYTES # BLD AUTO: 0.8 K/UL — LOW (ref 1–3.3)
LYMPHOCYTES # BLD AUTO: 5.5 % — LOW (ref 13–44)
LYMPHOCYTES # FLD: 9 % — SIGNIFICANT CHANGE UP
MAGNESIUM SERPL-MCNC: 1.7 MG/DL — SIGNIFICANT CHANGE UP (ref 1.6–2.6)
MAGNESIUM SERPL-MCNC: 2.1 MG/DL — SIGNIFICANT CHANGE UP (ref 1.6–2.6)
MCHC RBC-ENTMCNC: 31.1 PG — SIGNIFICANT CHANGE UP (ref 27–34)
MCHC RBC-ENTMCNC: 31.3 PG — SIGNIFICANT CHANGE UP (ref 27–34)
MCHC RBC-ENTMCNC: 32 PG — SIGNIFICANT CHANGE UP (ref 27–34)
MCHC RBC-ENTMCNC: 32.3 GM/DL — SIGNIFICANT CHANGE UP (ref 32–36)
MCHC RBC-ENTMCNC: 32.6 GM/DL — SIGNIFICANT CHANGE UP (ref 32–36)
MCHC RBC-ENTMCNC: 32.7 PG — SIGNIFICANT CHANGE UP (ref 27–34)
MCHC RBC-ENTMCNC: 33.1 GM/DL — SIGNIFICANT CHANGE UP (ref 32–36)
MCHC RBC-ENTMCNC: 33.5 GM/DL — SIGNIFICANT CHANGE UP (ref 32–36)
MCV RBC AUTO: 96.3 FL — SIGNIFICANT CHANGE UP (ref 80–100)
MCV RBC AUTO: 96.5 FL — SIGNIFICANT CHANGE UP (ref 80–100)
MCV RBC AUTO: 96.8 FL — SIGNIFICANT CHANGE UP (ref 80–100)
MCV RBC AUTO: 97.6 FL — SIGNIFICANT CHANGE UP (ref 80–100)
MONOCYTES # BLD AUTO: 1.4 K/UL — HIGH (ref 0–0.9)
MONOCYTES NFR BLD AUTO: 9.8 % — SIGNIFICANT CHANGE UP (ref 2–14)
MONOS+MACROS # FLD: 34 % — SIGNIFICANT CHANGE UP
NEUTROPHILS # BLD AUTO: 11.8 K/UL — HIGH (ref 1.8–7.4)
NEUTROPHILS NFR BLD AUTO: 83.5 % — HIGH (ref 43–77)
ORGANISM # SPEC MICROSCOPIC CNT: SIGNIFICANT CHANGE UP
ORGANISM # SPEC MICROSCOPIC CNT: SIGNIFICANT CHANGE UP
PH FLD: >8 — SIGNIFICANT CHANGE UP
PHOSPHATE SERPL-MCNC: 3.2 MG/DL — SIGNIFICANT CHANGE UP (ref 2.5–4.5)
PHOSPHATE SERPL-MCNC: 3.4 MG/DL — SIGNIFICANT CHANGE UP (ref 2.5–4.5)
PLATELET # BLD AUTO: 237 K/UL — SIGNIFICANT CHANGE UP (ref 150–400)
PLATELET # BLD AUTO: 295 K/UL — SIGNIFICANT CHANGE UP (ref 150–400)
PLATELET # BLD AUTO: 339 K/UL — SIGNIFICANT CHANGE UP (ref 150–400)
PLATELET # BLD AUTO: 344 K/UL — SIGNIFICANT CHANGE UP (ref 150–400)
POTASSIUM SERPL-MCNC: 3.7 MMOL/L — SIGNIFICANT CHANGE UP (ref 3.5–5.3)
POTASSIUM SERPL-MCNC: 4 MMOL/L — SIGNIFICANT CHANGE UP (ref 3.5–5.3)
POTASSIUM SERPL-SCNC: 3.7 MMOL/L — SIGNIFICANT CHANGE UP (ref 3.5–5.3)
POTASSIUM SERPL-SCNC: 4 MMOL/L — SIGNIFICANT CHANGE UP (ref 3.5–5.3)
PROT FLD-MCNC: 1.9 G/DL — SIGNIFICANT CHANGE UP
PROTHROM AB SERPL-ACNC: 17.2 SEC — HIGH (ref 9.8–12.7)
RBC # BLD: 2.12 M/UL — LOW (ref 4.2–5.8)
RBC # BLD: 2.8 M/UL — LOW (ref 4.2–5.8)
RBC # BLD: 2.91 M/UL — LOW (ref 4.2–5.8)
RBC # BLD: 4.28 M/UL — SIGNIFICANT CHANGE UP (ref 4.2–5.8)
RBC # FLD: 13.4 % — SIGNIFICANT CHANGE UP (ref 10.3–14.5)
RBC # FLD: 13.7 % — SIGNIFICANT CHANGE UP (ref 10.3–14.5)
RCV VOL RI: 370 /UL — HIGH (ref 0–5)
SODIUM SERPL-SCNC: 139 MMOL/L — SIGNIFICANT CHANGE UP (ref 135–145)
SODIUM SERPL-SCNC: 140 MMOL/L — SIGNIFICANT CHANGE UP (ref 135–145)
SPECIMEN SOURCE: SIGNIFICANT CHANGE UP
TOTAL NUCLEATED CELL COUNT, BODY FLUID: 450 /UL — HIGH (ref 0–5)
TROPONIN T SERPL-MCNC: 0.4 NG/ML — HIGH (ref 0–0.06)
TUBE TYPE: SIGNIFICANT CHANGE UP
VANCOMYCIN TROUGH SERPL-MCNC: 13.9 UG/ML — SIGNIFICANT CHANGE UP (ref 10–20)
WBC # BLD: 10.5 K/UL — SIGNIFICANT CHANGE UP (ref 3.8–10.5)
WBC # BLD: 14.1 K/UL — HIGH (ref 3.8–10.5)
WBC # BLD: 14.5 K/UL — HIGH (ref 3.8–10.5)
WBC # BLD: 6.5 K/UL — SIGNIFICANT CHANGE UP (ref 3.8–10.5)
WBC # FLD AUTO: 10.5 K/UL — SIGNIFICANT CHANGE UP (ref 3.8–10.5)
WBC # FLD AUTO: 14.1 K/UL — HIGH (ref 3.8–10.5)
WBC # FLD AUTO: 14.5 K/UL — HIGH (ref 3.8–10.5)
WBC # FLD AUTO: 6.5 K/UL — SIGNIFICANT CHANGE UP (ref 3.8–10.5)

## 2017-09-17 PROCEDURE — 99232 SBSQ HOSP IP/OBS MODERATE 35: CPT | Mod: 24

## 2017-09-17 PROCEDURE — 93306 TTE W/DOPPLER COMPLETE: CPT | Mod: 26

## 2017-09-17 PROCEDURE — 99233 SBSQ HOSP IP/OBS HIGH 50: CPT

## 2017-09-17 PROCEDURE — 93010 ELECTROCARDIOGRAM REPORT: CPT

## 2017-09-17 PROCEDURE — 71010: CPT | Mod: 26,77

## 2017-09-17 PROCEDURE — 71010: CPT | Mod: 26

## 2017-09-17 RX ORDER — ACETAMINOPHEN 500 MG
1000 TABLET ORAL ONCE
Qty: 0 | Refills: 0 | Status: COMPLETED | OUTPATIENT
Start: 2017-09-17 | End: 2017-09-18

## 2017-09-17 RX ORDER — PANTOPRAZOLE SODIUM 20 MG/1
40 TABLET, DELAYED RELEASE ORAL ONCE
Qty: 0 | Refills: 0 | Status: COMPLETED | OUTPATIENT
Start: 2017-09-17 | End: 2017-09-17

## 2017-09-17 RX ORDER — ASPIRIN/CALCIUM CARB/MAGNESIUM 324 MG
300 TABLET ORAL EVERY 24 HOURS
Qty: 0 | Refills: 0 | Status: DISCONTINUED | OUTPATIENT
Start: 2017-09-17 | End: 2017-09-18

## 2017-09-17 RX ORDER — SODIUM CHLORIDE 9 MG/ML
500 INJECTION, SOLUTION INTRAVENOUS ONCE
Qty: 0 | Refills: 0 | Status: COMPLETED | OUTPATIENT
Start: 2017-09-17 | End: 2017-09-17

## 2017-09-17 RX ORDER — FOSPHENYTOIN 50 MG/ML
200 INJECTION INTRAMUSCULAR; INTRAVENOUS EVERY 12 HOURS
Qty: 0 | Refills: 0 | Status: DISCONTINUED | OUTPATIENT
Start: 2017-09-17 | End: 2017-09-20

## 2017-09-17 RX ORDER — SODIUM CHLORIDE 9 MG/ML
1000 INJECTION, SOLUTION INTRAVENOUS
Qty: 0 | Refills: 0 | Status: DISCONTINUED | OUTPATIENT
Start: 2017-09-17 | End: 2017-09-18

## 2017-09-17 RX ADMIN — FOSPHENYTOIN 104 MILLIGRAM(S) PE: 50 INJECTION INTRAMUSCULAR; INTRAVENOUS at 05:09

## 2017-09-17 RX ADMIN — Medication 3 MILLILITER(S): at 11:13

## 2017-09-17 RX ADMIN — Medication 150 MILLIGRAM(S): at 18:06

## 2017-09-17 RX ADMIN — Medication 3 MILLILITER(S): at 05:14

## 2017-09-17 RX ADMIN — Medication 10 MILLIGRAM(S): at 06:08

## 2017-09-17 RX ADMIN — Medication 100 MILLIGRAM(S): at 21:58

## 2017-09-17 RX ADMIN — Medication 100 MILLIGRAM(S): at 05:09

## 2017-09-17 RX ADMIN — PANTOPRAZOLE SODIUM 40 MILLIGRAM(S): 20 TABLET, DELAYED RELEASE ORAL at 23:35

## 2017-09-17 RX ADMIN — SODIUM CHLORIDE 1000 MILLILITER(S): 9 INJECTION, SOLUTION INTRAVENOUS at 22:15

## 2017-09-17 RX ADMIN — Medication 100 MILLIGRAM(S): at 12:09

## 2017-09-17 RX ADMIN — Medication 150 MILLIGRAM(S): at 06:08

## 2017-09-17 RX ADMIN — SODIUM CHLORIDE 50 MILLILITER(S): 9 INJECTION, SOLUTION INTRAVENOUS at 22:15

## 2017-09-17 RX ADMIN — CEFEPIME 100 MILLIGRAM(S): 1 INJECTION, POWDER, FOR SOLUTION INTRAMUSCULAR; INTRAVENOUS at 05:08

## 2017-09-17 RX ADMIN — Medication 100 MILLIGRAM(S): at 13:20

## 2017-09-17 RX ADMIN — FOSPHENYTOIN 108 MILLIGRAM(S) PE: 50 INJECTION INTRAMUSCULAR; INTRAVENOUS at 17:02

## 2017-09-17 RX ADMIN — PREGABALIN 500 MICROGRAM(S): 225 CAPSULE ORAL at 12:09

## 2017-09-17 RX ADMIN — Medication 1 MILLIGRAM(S): at 12:08

## 2017-09-17 RX ADMIN — Medication 3 MILLILITER(S): at 17:20

## 2017-09-17 RX ADMIN — CEFEPIME 100 MILLIGRAM(S): 1 INJECTION, POWDER, FOR SOLUTION INTRAMUSCULAR; INTRAVENOUS at 17:33

## 2017-09-17 RX ADMIN — Medication 300 MILLIGRAM(S): at 10:06

## 2017-09-17 RX ADMIN — Medication 4 MILLIGRAM(S): at 17:32

## 2017-09-17 RX ADMIN — ENOXAPARIN SODIUM 40 MILLIGRAM(S): 100 INJECTION SUBCUTANEOUS at 12:08

## 2017-09-17 NOTE — PROGRESS NOTE ADULT - SUBJECTIVE AND OBJECTIVE BOX
Patient is a 82y old  Male who presents with a chief complaint of feeding tube clogged s/p maryjane ford (15 Sep 2017 16:33)      SUBJECTIVE: Patient seen and examined on blue team morning rounds. No acute events overnight. J tube is now functioning. On tube feeds. Was IV locked today. Respiratory status has improved.      OBJECTIVE:     ** Physical Exam **  Gen: Alert, NAD  Chest tube drainage appears serous.   ABD ; soft, bs+, mild distension +  EXT - trace edema+  ** Vital signs / I&O's **    Vital Signs Last 24 Hrs  T(C): 36.7 (17 Sep 2017 15:00), Max: 36.8 (16 Sep 2017 19:00)  T(F): 98 (17 Sep 2017 15:00), Max: 98.2 (16 Sep 2017 19:00)  HR: 108 (17 Sep 2017 18:00) (88 - 108)  BP: 109/56 (17 Sep 2017 18:00) (97/55 - 129/67)  BP(mean): 77 (17 Sep 2017 18:00) (70 - 95)  RR: 28 (17 Sep 2017 18:00) (22 - 30)  SpO2: 99% (17 Sep 2017 18:00) (95% - 100%)      16 Sep 2017 07:01  -  17 Sep 2017 07:00  --------------------------------------------------------  IN:    dextrose 5% + sodium chloride 0.9%: 800 mL    IV PiggyBack: 550 mL    lactated ringers.: 150 mL    Solution: 50 mL    Vital HN: 60 mL  Total IN: 1610 mL    OUT:    Chest Tube: 300 mL    Voided: 1415 mL  Total OUT: 1715 mL    Total NET: -105 mL      17 Sep 2017 07:01  -  17 Sep 2017 18:20  --------------------------------------------------------  IN:    IV PiggyBack: 300 mL    Solution: 50 mL    Vital HN: 430 mL  Total IN: 780 mL    OUT:    Chest Tube: 120 mL    Voided: 375 mL  Total OUT: 495 mL    Total NET: 285 mL            ** Labs **                          9.1    10.5  )-----------( 339      ( 17 Sep 2017 08:09 )             28.0     17 Sep 2017 05:05    140    |  107    |  22     ----------------------------<  106    4.0     |  23     |  0.75     Ca    9.0        17 Sep 2017 05:05  Phos  3.2       17 Sep 2017 05:05  Mg     2.1       17 Sep 2017 05:05      PT/INR - ( 17 Sep 2017 05:05 )   PT: 17.2 sec;   INR: 1.58 ratio         PTT - ( 17 Sep 2017 05:05 )  PTT:29.7 sec  CAPILLARY BLOOD GLUCOSE                  MEDICATIONS  (STANDING):  enoxaparin Injectable 40 milliGRAM(s) SubCutaneous daily  ALBUTerol/ipratropium for Nebulization 3 milliLiter(s) Nebulizer every 6 hours  influenza   Vaccine 0.5 milliLiter(s) IntraMuscular once  cefepime  IVPB 1000 milliGRAM(s) IV Intermittent every 12 hours  metroNIDAZOLE  IVPB 500 milliGRAM(s) IV Intermittent every 8 hours  vancomycin  IVPB 750 milliGRAM(s) IV Intermittent every 12 hours  furosemide   Injectable 10 milliGRAM(s) IV Push daily  cyanocobalamin Injectable 500 MICROGram(s) SubCutaneous daily  pyridoxine Injectable 100 milliGRAM(s) IV Push daily  folic acid Injectable 1 milliGRAM(s) IV Push daily  fosphenytoin IVPB 200 milliGRAM(s) PE IV Intermittent every 12 hours  aspirin Suppository 300 milliGRAM(s) Rectal every 24 hours  methylPREDNISolone sodium succinate Injectable 4 milliGRAM(s) IV Push every 24 hours    MEDICATIONS  (PRN):

## 2017-09-17 NOTE — PROVIDER CONTACT NOTE (OTHER) - ACTION/TREATMENT ORDERED:
500 LR bolus ordered and given. Maintenance IVF ordered @ 50cc/hr, EKG ordered and done. Labs ordered and done. Will continue to monitor.

## 2017-09-17 NOTE — PROGRESS NOTE ADULT - SUBJECTIVE AND OBJECTIVE BOX
Blue Team Surgery Progress Note - Pager [phone]    Patient is a 82y old  Male who presents with a chief complaint of feeding tube clogged s/p maryjane logan (15 Sep 2017 16:33)      SUBJECTIVE: Patient seen and examined on blue team morning rounds. No acute events overnight. J tube is now functioning. On tube feeds. Was IV locked today. Respiratory status has improved.      OBJECTIVE:     ** Physical Exam **  Gen: Alert, NAD  Chest tube drainage appears serous.     ** Vital signs / I&O's **    Vital Signs Last 24 Hrs  T(C): 36.7 (17 Sep 2017 15:00), Max: 36.8 (16 Sep 2017 19:00)  T(F): 98 (17 Sep 2017 15:00), Max: 98.2 (16 Sep 2017 19:00)  HR: 108 (17 Sep 2017 18:00) (88 - 108)  BP: 109/56 (17 Sep 2017 18:00) (97/55 - 129/67)  BP(mean): 77 (17 Sep 2017 18:00) (70 - 95)  RR: 28 (17 Sep 2017 18:00) (22 - 30)  SpO2: 99% (17 Sep 2017 18:00) (95% - 100%)      16 Sep 2017 07:01  -  17 Sep 2017 07:00  --------------------------------------------------------  IN:    dextrose 5% + sodium chloride 0.9%: 800 mL    IV PiggyBack: 550 mL    lactated ringers.: 150 mL    Solution: 50 mL    Vital HN: 60 mL  Total IN: 1610 mL    OUT:    Chest Tube: 300 mL    Voided: 1415 mL  Total OUT: 1715 mL    Total NET: -105 mL      17 Sep 2017 07:01  -  17 Sep 2017 18:20  --------------------------------------------------------  IN:    IV PiggyBack: 300 mL    Solution: 50 mL    Vital HN: 430 mL  Total IN: 780 mL    OUT:    Chest Tube: 120 mL    Voided: 375 mL  Total OUT: 495 mL    Total NET: 285 mL            ** Labs **                          9.1    10.5  )-----------( 339      ( 17 Sep 2017 08:09 )             28.0     17 Sep 2017 05:05    140    |  107    |  22     ----------------------------<  106    4.0     |  23     |  0.75     Ca    9.0        17 Sep 2017 05:05  Phos  3.2       17 Sep 2017 05:05  Mg     2.1       17 Sep 2017 05:05      PT/INR - ( 17 Sep 2017 05:05 )   PT: 17.2 sec;   INR: 1.58 ratio         PTT - ( 17 Sep 2017 05:05 )  PTT:29.7 sec  CAPILLARY BLOOD GLUCOSE                  MEDICATIONS  (STANDING):  enoxaparin Injectable 40 milliGRAM(s) SubCutaneous daily  ALBUTerol/ipratropium for Nebulization 3 milliLiter(s) Nebulizer every 6 hours  influenza   Vaccine 0.5 milliLiter(s) IntraMuscular once  cefepime  IVPB 1000 milliGRAM(s) IV Intermittent every 12 hours  metroNIDAZOLE  IVPB 500 milliGRAM(s) IV Intermittent every 8 hours  vancomycin  IVPB 750 milliGRAM(s) IV Intermittent every 12 hours  furosemide   Injectable 10 milliGRAM(s) IV Push daily  cyanocobalamin Injectable 500 MICROGram(s) SubCutaneous daily  pyridoxine Injectable 100 milliGRAM(s) IV Push daily  folic acid Injectable 1 milliGRAM(s) IV Push daily  fosphenytoin IVPB 200 milliGRAM(s) PE IV Intermittent every 12 hours  aspirin Suppository 300 milliGRAM(s) Rectal every 24 hours  methylPREDNISolone sodium succinate Injectable 4 milliGRAM(s) IV Push every 24 hours    MEDICATIONS  (PRN):

## 2017-09-17 NOTE — PROGRESS NOTE ADULT - ASSESSMENT
ASSESSMENT: 82yMale with 2y M, s/p robot assisted [place]-Trent, possible persistent anastomotic leak, possible infected pleural effusion(s). Jejunostomy feeding tube unclogged by general surgery.     PLAN:   Neurologic:  pain control    Respiratory:  Maintain on Non-rebreather, Keep sat >90%, try to titrate to venti mask 55%  Received empiric abx in ED, hold for now until cultures return  Chest tube L to drain effusion  Send cultures of drainage for analysis, culture, cell count and cytology   Duoneb's/Albuterol PRN  AM chest xray    Cardiovascular:  Continue BP meds with hold parameters   Vital Signs Q-4    Gastrointestinal/Nutrition:  Keep NPO  J tube will call IR to replace in am    Genitourinary/Renal:  Strict I's and O's   D5 NS @75/hr  pleural effusion lytes  clarify flomax (can't be crushed- currently holding)    Hematologic:  Lovenox for DVT ppx     Infectious Disease:  f/u cultures  antibiotics - cefepime, vanc, flagyl    Trend Leukocytosis   f/u Culture Pleural effusion    Endocrine:    Disposition: SICU        Start tube fee Evaluated in AM round  Off pressure, continue midodrine, gentle hydration, course of multiple abx  Clears as per surgery  Still waiting for a family meeting, palliative care reconsult  Lactulose, rifaximin, neuro status unchanged

## 2017-09-17 NOTE — PROGRESS NOTE ADULT - ASSESSMENT
82y M, s/p [place]-Trent, possible persistent anastomotic leak, possible infected pleural effusion(s). Jejunostomy feeding tube unclogged by general surgery.     1) will leave his right drain in place until we can repeat a swallow study -no immediate plans for removal.    2) Left chest tube mgnt per general surgery-300cc /24h  Case d/w Dr. Burch, thoracic surgery will continue to follow.

## 2017-09-17 NOTE — PROGRESS NOTE ADULT - SUBJECTIVE AND OBJECTIVE BOX
MEJIA MCCORDBA:[id number],   82yMale followed for:  penicillin (Rash)    PAST MEDICAL & SURGICAL HISTORY:  Coronary artery disease: s/p 3 stents on June 30, 2017 at [facility] Dr. Lavelle Reid  OA (osteoarthritis) of knee: right  Former smoker, stopped smoking in distant past  Rheumatoid arthritis  Seizure disorder: 1 episode approximately 15 yrs ago  Asthma  Hyperlipidemia  BPH (benign prostatic hyperplasia)  HTN (hypertension)  Esophagus cancer  Chronic allergic rhinitis  BCC (basal cell carcinoma): skin  Cerebrovascular accident (CVA)  Anxiety  History of tonsillectomy  H/O heart artery stent: June 30, 2017  Knee arthropathy: left  History of total hip replacement: left  History of hernia repair    FAMILY HISTORY:  Family history of heart attack (Sibling)  Family history of pulmonary embolism: father @ 49 yr old  FH: CAD (coronary artery disease)    MEDICATIONS  (STANDING):  enoxaparin Injectable 40 milliGRAM(s) SubCutaneous daily  ALBUTerol/ipratropium for Nebulization 3 milliLiter(s) Nebulizer every 6 hours  influenza   Vaccine 0.5 milliLiter(s) IntraMuscular once  cefepime  IVPB 1000 milliGRAM(s) IV Intermittent every 12 hours  metroNIDAZOLE  IVPB 500 milliGRAM(s) IV Intermittent every 8 hours  vancomycin  IVPB 750 milliGRAM(s) IV Intermittent every 12 hours  furosemide   Injectable 10 milliGRAM(s) IV Push daily  cyanocobalamin Injectable 500 MICROGram(s) SubCutaneous daily  pyridoxine Injectable 100 milliGRAM(s) IV Push daily  folic acid Injectable 1 milliGRAM(s) IV Push daily  fosphenytoin IVPB 200 milliGRAM(s) PE IV Intermittent every 12 hours  aspirin Suppository 300 milliGRAM(s) Rectal every 24 hours    MEDICATIONS  (PRN):      Vital Signs Last 24 Hrs  T(C): 36.4 (17 Sep 2017 07:00), Max: 36.8 (16 Sep 2017 19:00)  T(F): 97.5 (17 Sep 2017 07:00), Max: 98.2 (16 Sep 2017 19:00)  HR: 92 (17 Sep 2017 11:14) (88 - 99)  BP: 119/65 (17 Sep 2017 11:00) (97/52 - 129/67)  BP(mean): 86 (17 Sep 2017 11:00) (72 - 95)  RR: 24 (17 Sep 2017 11:00) (22 - 30)  SpO2: 100% (17 Sep 2017 11:14) (95% - 100%)  nc/at  s1s2  cta  soft, nt, nd no guarding or rebound  no c/c/e    CBC Full  -  ( 17 Sep 2017 08:09 )  WBC Count : 10.5 K/uL  Hemoglobin : 9.1 g/dL  Hematocrit : 28.0 %  Platelet Count - Automated : 339 K/uL  Mean Cell Volume : 96.3 fl  Mean Cell Hemoglobin : 31.3 pg  Mean Cell Hemoglobin Concentration : 32.6 gm/dL  Auto Neutrophil # : x  Auto Lymphocyte # : x  Auto Monocyte # : x  Auto Eosinophil # : x  Auto Basophil # : x  Auto Neutrophil % : x  Auto Lymphocyte % : x  Auto Monocyte % : x  Auto Eosinophil % : x  Auto Basophil % : x    09-17    140  |  107  |  22  ----------------------------<  106<H>  4.0   |  23  |  0.75    Ca    9.0      17 Sep 2017 05:05  Phos  3.2     09-17  Mg     2.1     09-17      PT/INR - ( 17 Sep 2017 05:05 )   PT: 17.2 sec;   INR: 1.58 ratio         PTT - ( 17 Sep 2017 05:05 )  PTT:29.7 sec

## 2017-09-17 NOTE — PROGRESS NOTE ADULT - SUBJECTIVE AND OBJECTIVE BOX
MEJIA LATIF is 82y M, s/p esophagogastrectomy (partial) with intra-thoracic esophagogastric anastomosis ([place]-Tretn procedure) on 8/14/17 - post-op complicated by anastomotic leak, discharged to [facility] on 8/27 with R chest drain in place. He was admitted from rehab overnight with clogged jejunostomy feeding tube. He was taking medications by mouth. Patient reports ongoing SOB, states that he has not required oxygen while at rehab. Denies fevers/chills. Per patient, drainage from R chest has been minimal, murky brown fluid. The R CT drain's bulb has been difficult to keep to self-suction, as it fills with air quickly. Patient reports passage of flatus and stool. L pigtail catheter placed to drain pleural effusion.  Of note, patient is on prednisone 5 mg daily for rheumatoid arthritis.    24 HOUR EVENTS: J-tube remained clogged despite alteplace, soda water, multiple attempts to dislodge. Given lasix for transudative effusion, ordered echo will f/u when done.

## 2017-09-17 NOTE — PROGRESS NOTE ADULT - ASSESSMENT
82M w/ respiratory distress secondary to worsened left pleural effusion, s/p [place]-Trent, complicated by anastomotic leak      - Monitor respiratory status  - Check status of J tube, unclogged  bu  surg  - Swallow study Monday  pleurex  catheter, right,  left chest  tube  c.c  anemia   dvt  ppx    spoke  with wife

## 2017-09-17 NOTE — PROGRESS NOTE ADULT - ATTENDING COMMENTS
Pt evaluated in AM round  Lt effusion resolved with PIG tail catheter placement, pleural fluid exudate  J tube unclogged, start tube feed, DC IVF  BC coag neg staph possible contaminant, continue Vanco fro now, repeat culture pending  Small dose Lasix  Mobilization

## 2017-09-17 NOTE — PROGRESS NOTE ADULT - ASSESSMENT
82M w/ respiratory distress secondary to worsened left pleural effusion, s/p [place]-Trent, complicated by anastomotic leak    Plan:  - Monitor respiratory status  - Monitor J tube function  - Echo  - Swallow study Monday

## 2017-09-17 NOTE — PROGRESS NOTE ADULT - SUBJECTIVE AND OBJECTIVE BOX
SUBJECTIVE / OVERNIGHT EVENTS: No nausea, vomiting or diarrhea, no fever or chills, no dizziness or chest pain, no dysuria or hematuria ., in  a chair,  wife  at  bedside      CAPILLARY BLOOD GLUCOSE        I&O's Summary    16 Sep 2017 07:01  -  17 Sep 2017 07:00  --------------------------------------------------------  IN: 1610 mL / OUT: 1715 mL / NET: -105 mL    17 Sep 2017 07:01  -  17 Sep 2017 11:41  --------------------------------------------------------  IN: 90 mL / OUT: 100 mL / NET: -10 mL        PHYSICAL EXAM:  HEAD:  Atraumatic, Normocephalic  EYES: EOMI, PERRLA, conjunctiva and sclera clear  NECK: Supple, No JVD  CHEST/LUNG:   right  pleurex  catheter,  left  chest  tube  HEART: Regular rate and rhythm; No murmurs, rubs, or gallops  ABDOMEN: Soft, Nontender, Nondistended; Bowel sounds present  EXTREMITIES:  2+ Peripheral Pulses, No clubbing, cyanosis, or edema  PSYCH: AAOx3  NEUROLOGY: non-focal  SKIN: No rashes or lesions    MEDICATIONS  (STANDING):  enoxaparin Injectable 40 milliGRAM(s) SubCutaneous daily  ALBUTerol/ipratropium for Nebulization 3 milliLiter(s) Nebulizer every 6 hours  influenza   Vaccine 0.5 milliLiter(s) IntraMuscular once  cefepime  IVPB 1000 milliGRAM(s) IV Intermittent every 12 hours  metroNIDAZOLE  IVPB 500 milliGRAM(s) IV Intermittent every 8 hours  vancomycin  IVPB 750 milliGRAM(s) IV Intermittent every 12 hours  furosemide   Injectable 10 milliGRAM(s) IV Push daily  cyanocobalamin Injectable 500 MICROGram(s) SubCutaneous daily  pyridoxine Injectable 100 milliGRAM(s) IV Push daily  folic acid Injectable 1 milliGRAM(s) IV Push daily  fosphenytoin IVPB 200 milliGRAM(s) PE IV Intermittent every 12 hours  aspirin Suppository 300 milliGRAM(s) Rectal every 24 hours    MEDICATIONS  (PRN):      LABS:                        9.1    10.5  )-----------( 339      ( 17 Sep 2017 08:09 )             28.0     09-17    140  |  107  |  22  ----------------------------<  106<H>  4.0   |  23  |  0.75    Ca    9.0      17 Sep 2017 05:05  Phos  3.2     09-17  Mg     2.1     09-17      PT/INR - ( 17 Sep 2017 05:05 )   PT: 17.2 sec;   INR: 1.58 ratio         PTT - ( 17 Sep 2017 05:05 )  PTT:29.7 sec            09-16 @ 04:53  4.0  30    Hemoglobin A1C, Whole Blood: 5.2 % (06-30 @ 19:48)        Cultures:    EKG:    Radiological Studies:    Consultant(s) Notes Reviewed:      Care Discussed with Consultants/Other Providers:

## 2017-09-17 NOTE — PROGRESS NOTE ADULT - ASSESSMENT
significant improvement with chest tube.  await [facility] study in am

## 2017-09-18 ENCOUNTER — APPOINTMENT (OUTPATIENT)
Dept: SURGICAL ONCOLOGY | Facility: CLINIC | Age: 82
End: 2017-09-18

## 2017-09-18 DIAGNOSIS — I25.10 ATHEROSCLEROTIC HEART DISEASE OF NATIVE CORONARY ARTERY WITHOUT ANGINA PECTORIS: ICD-10-CM

## 2017-09-18 DIAGNOSIS — I21.4 NON-ST ELEVATION (NSTEMI) MYOCARDIAL INFARCTION: ICD-10-CM

## 2017-09-18 DIAGNOSIS — I95.9 HYPOTENSION, UNSPECIFIED: ICD-10-CM

## 2017-09-18 LAB
ANION GAP SERPL CALC-SCNC: 13 MMOL/L — SIGNIFICANT CHANGE UP (ref 5–17)
ANION GAP SERPL CALC-SCNC: 15 MMOL/L — SIGNIFICANT CHANGE UP (ref 5–17)
APPEARANCE UR: ABNORMAL
APTT BLD: 30.6 SEC — SIGNIFICANT CHANGE UP (ref 27.5–37.4)
APTT BLD: 44.3 SEC — HIGH (ref 27.5–37.4)
APTT BLD: 63 SEC — HIGH (ref 27.5–37.4)
BILIRUB UR-MCNC: NEGATIVE — SIGNIFICANT CHANGE UP
BLD GP AB SCN SERPL QL: NEGATIVE — SIGNIFICANT CHANGE UP
BUN SERPL-MCNC: 27 MG/DL — HIGH (ref 7–23)
BUN SERPL-MCNC: 28 MG/DL — HIGH (ref 7–23)
CALCIUM SERPL-MCNC: 8.4 MG/DL — SIGNIFICANT CHANGE UP (ref 8.4–10.5)
CALCIUM SERPL-MCNC: 8.6 MG/DL — SIGNIFICANT CHANGE UP (ref 8.4–10.5)
CHLORIDE SERPL-SCNC: 105 MMOL/L — SIGNIFICANT CHANGE UP (ref 96–108)
CHLORIDE SERPL-SCNC: 106 MMOL/L — SIGNIFICANT CHANGE UP (ref 96–108)
CK MB BLD-MCNC: 14.1 % — HIGH (ref 0–3.5)
CK MB BLD-MCNC: 14.4 % — HIGH (ref 0–3.5)
CK MB CFR SERPL CALC: 4 NG/ML — SIGNIFICANT CHANGE UP (ref 0–6.7)
CK MB CFR SERPL CALC: 4.2 NG/ML — SIGNIFICANT CHANGE UP (ref 0–6.7)
CK MB CFR SERPL CALC: 5.5 NG/ML — SIGNIFICANT CHANGE UP (ref 0–6.7)
CK MB CFR SERPL CALC: 5.6 NG/ML — SIGNIFICANT CHANGE UP (ref 0–6.7)
CK SERPL-CCNC: 28 U/L — LOW (ref 30–200)
CK SERPL-CCNC: 29 U/L — LOW (ref 30–200)
CK SERPL-CCNC: 39 U/L — SIGNIFICANT CHANGE UP (ref 30–200)
CK SERPL-CCNC: 39 U/L — SIGNIFICANT CHANGE UP (ref 30–200)
CO2 SERPL-SCNC: 17 MMOL/L — LOW (ref 22–31)
CO2 SERPL-SCNC: 20 MMOL/L — LOW (ref 22–31)
COLOR SPEC: YELLOW — SIGNIFICANT CHANGE UP
CREAT SERPL-MCNC: 0.75 MG/DL — SIGNIFICANT CHANGE UP (ref 0.5–1.3)
CREAT SERPL-MCNC: 0.77 MG/DL — SIGNIFICANT CHANGE UP (ref 0.5–1.3)
DIFF PNL FLD: NEGATIVE — SIGNIFICANT CHANGE UP
GAS PNL BLDA: SIGNIFICANT CHANGE UP
GLUCOSE SERPL-MCNC: 204 MG/DL — HIGH (ref 70–99)
GLUCOSE SERPL-MCNC: 262 MG/DL — HIGH (ref 70–99)
GLUCOSE UR QL: NEGATIVE — SIGNIFICANT CHANGE UP
HCT VFR BLD CALC: 24.1 % — LOW (ref 39–50)
HCT VFR BLD CALC: 25 % — LOW (ref 39–50)
HCT VFR BLD CALC: 31.3 % — LOW (ref 39–50)
HGB BLD-MCNC: 10.3 G/DL — LOW (ref 13–17)
HGB BLD-MCNC: 8 G/DL — LOW (ref 13–17)
HGB BLD-MCNC: 8.4 G/DL — LOW (ref 13–17)
INR BLD: 1.67 RATIO — HIGH (ref 0.88–1.16)
KETONES UR-MCNC: NEGATIVE — SIGNIFICANT CHANGE UP
LEUKOCYTE ESTERASE UR-ACNC: ABNORMAL
MAGNESIUM SERPL-MCNC: 1.9 MG/DL — SIGNIFICANT CHANGE UP (ref 1.6–2.6)
MAGNESIUM SERPL-MCNC: 2 MG/DL — SIGNIFICANT CHANGE UP (ref 1.6–2.6)
MCHC RBC-ENTMCNC: 31.7 PG — SIGNIFICANT CHANGE UP (ref 27–34)
MCHC RBC-ENTMCNC: 32.1 PG — SIGNIFICANT CHANGE UP (ref 27–34)
MCHC RBC-ENTMCNC: 32.4 PG — SIGNIFICANT CHANGE UP (ref 27–34)
MCHC RBC-ENTMCNC: 33 GM/DL — SIGNIFICANT CHANGE UP (ref 32–36)
MCHC RBC-ENTMCNC: 33.3 GM/DL — SIGNIFICANT CHANGE UP (ref 32–36)
MCHC RBC-ENTMCNC: 33.5 GM/DL — SIGNIFICANT CHANGE UP (ref 32–36)
MCV RBC AUTO: 95.9 FL — SIGNIFICANT CHANGE UP (ref 80–100)
MCV RBC AUTO: 96.6 FL — SIGNIFICANT CHANGE UP (ref 80–100)
MCV RBC AUTO: 96.6 FL — SIGNIFICANT CHANGE UP (ref 80–100)
NITRITE UR-MCNC: NEGATIVE — SIGNIFICANT CHANGE UP
NT-PROBNP SERPL-SCNC: 3503 PG/ML — HIGH (ref 0–300)
PH UR: 5.5 — SIGNIFICANT CHANGE UP (ref 5–8)
PHENYTOIN FREE SERPL-MCNC: 3.2 UG/ML — LOW (ref 10–20)
PHOSPHATE SERPL-MCNC: 2.9 MG/DL — SIGNIFICANT CHANGE UP (ref 2.5–4.5)
PHOSPHATE SERPL-MCNC: 3.1 MG/DL — SIGNIFICANT CHANGE UP (ref 2.5–4.5)
PLATELET # BLD AUTO: 392 K/UL — SIGNIFICANT CHANGE UP (ref 150–400)
PLATELET # BLD AUTO: 406 K/UL — HIGH (ref 150–400)
PLATELET # BLD AUTO: 418 K/UL — HIGH (ref 150–400)
POTASSIUM SERPL-MCNC: 3.6 MMOL/L — SIGNIFICANT CHANGE UP (ref 3.5–5.3)
POTASSIUM SERPL-MCNC: 3.9 MMOL/L — SIGNIFICANT CHANGE UP (ref 3.5–5.3)
POTASSIUM SERPL-SCNC: 3.6 MMOL/L — SIGNIFICANT CHANGE UP (ref 3.5–5.3)
POTASSIUM SERPL-SCNC: 3.9 MMOL/L — SIGNIFICANT CHANGE UP (ref 3.5–5.3)
PROCALCITONIN SERPL-MCNC: 0.24 NG/ML — HIGH (ref 0–0.04)
PROT UR-MCNC: 30 MG/DL
PROTHROM AB SERPL-ACNC: 18.2 SEC — HIGH (ref 9.8–12.7)
RBC # BLD: 2.5 M/UL — LOW (ref 4.2–5.8)
RBC # BLD: 2.59 M/UL — LOW (ref 4.2–5.8)
RBC # BLD: 3.26 M/UL — LOW (ref 4.2–5.8)
RBC # FLD: 13.5 % — SIGNIFICANT CHANGE UP (ref 10.3–14.5)
RH IG SCN BLD-IMP: POSITIVE — SIGNIFICANT CHANGE UP
SODIUM SERPL-SCNC: 138 MMOL/L — SIGNIFICANT CHANGE UP (ref 135–145)
SODIUM SERPL-SCNC: 138 MMOL/L — SIGNIFICANT CHANGE UP (ref 135–145)
SP GR SPEC: 1.02 — SIGNIFICANT CHANGE UP (ref 1.01–1.02)
TROPONIN T SERPL-MCNC: 0.3 NG/ML — HIGH (ref 0–0.06)
TROPONIN T SERPL-MCNC: 0.36 NG/ML — HIGH (ref 0–0.06)
TROPONIN T SERPL-MCNC: 0.42 NG/ML — HIGH (ref 0–0.06)
TROPONIN T SERPL-MCNC: 0.54 NG/ML — HIGH (ref 0–0.06)
UROBILINOGEN FLD QL: 1
WBC # BLD: 17.6 K/UL — HIGH (ref 3.8–10.5)
WBC # BLD: 18 K/UL — HIGH (ref 3.8–10.5)
WBC # BLD: 18.5 K/UL — HIGH (ref 3.8–10.5)
WBC # FLD AUTO: 17.6 K/UL — HIGH (ref 3.8–10.5)
WBC # FLD AUTO: 18 K/UL — HIGH (ref 3.8–10.5)
WBC # FLD AUTO: 18.5 K/UL — HIGH (ref 3.8–10.5)

## 2017-09-18 PROCEDURE — 93321 DOPPLER ECHO F-UP/LMTD STD: CPT | Mod: 26,59

## 2017-09-18 PROCEDURE — 93308 TTE F-UP OR LMTD: CPT | Mod: 26,59

## 2017-09-18 PROCEDURE — 93010 ELECTROCARDIOGRAM REPORT: CPT

## 2017-09-18 PROCEDURE — 36620 INSERTION CATHETER ARTERY: CPT

## 2017-09-18 PROCEDURE — 99232 SBSQ HOSP IP/OBS MODERATE 35: CPT | Mod: 24

## 2017-09-18 PROCEDURE — 71010: CPT | Mod: 26

## 2017-09-18 PROCEDURE — 93306 TTE W/DOPPLER COMPLETE: CPT | Mod: 26

## 2017-09-18 PROCEDURE — 99291 CRITICAL CARE FIRST HOUR: CPT | Mod: 25

## 2017-09-18 PROCEDURE — 99232 SBSQ HOSP IP/OBS MODERATE 35: CPT

## 2017-09-18 PROCEDURE — 99233 SBSQ HOSP IP/OBS HIGH 50: CPT

## 2017-09-18 RX ORDER — NITROGLYCERIN 6.5 MG
5 CAPSULE, EXTENDED RELEASE ORAL
Qty: 50 | Refills: 0 | Status: DISCONTINUED | OUTPATIENT
Start: 2017-09-18 | End: 2017-09-18

## 2017-09-18 RX ORDER — CLOPIDOGREL BISULFATE 75 MG/1
75 TABLET, FILM COATED ORAL ONCE
Qty: 0 | Refills: 0 | Status: DISCONTINUED | OUTPATIENT
Start: 2017-09-18 | End: 2017-09-18

## 2017-09-18 RX ORDER — ALBUMIN HUMAN 25 %
250 VIAL (ML) INTRAVENOUS ONCE
Qty: 0 | Refills: 0 | Status: COMPLETED | OUTPATIENT
Start: 2017-09-18 | End: 2017-09-18

## 2017-09-18 RX ORDER — MAGNESIUM SULFATE 500 MG/ML
2 VIAL (ML) INJECTION ONCE
Qty: 0 | Refills: 0 | Status: COMPLETED | OUTPATIENT
Start: 2017-09-18 | End: 2017-09-18

## 2017-09-18 RX ORDER — PHENYLEPHRINE HYDROCHLORIDE 10 MG/ML
0.5 INJECTION INTRAVENOUS
Qty: 80 | Refills: 0 | Status: DISCONTINUED | OUTPATIENT
Start: 2017-09-18 | End: 2017-09-20

## 2017-09-18 RX ORDER — CLOPIDOGREL BISULFATE 75 MG/1
75 TABLET, FILM COATED ORAL DAILY
Qty: 0 | Refills: 0 | Status: DISCONTINUED | OUTPATIENT
Start: 2017-09-19 | End: 2017-09-20

## 2017-09-18 RX ORDER — NITROGLYCERIN 6.5 MG
0.4 CAPSULE, EXTENDED RELEASE ORAL ONCE
Qty: 0 | Refills: 0 | Status: COMPLETED | OUTPATIENT
Start: 2017-09-18 | End: 2017-09-18

## 2017-09-18 RX ORDER — HEPARIN SODIUM 5000 [USP'U]/ML
1000 INJECTION INTRAVENOUS; SUBCUTANEOUS
Qty: 25000 | Refills: 0 | Status: DISCONTINUED | OUTPATIENT
Start: 2017-09-18 | End: 2017-09-20

## 2017-09-18 RX ORDER — ASPIRIN/CALCIUM CARB/MAGNESIUM 324 MG
81 TABLET ORAL DAILY
Qty: 0 | Refills: 0 | Status: DISCONTINUED | OUTPATIENT
Start: 2017-09-18 | End: 2017-09-20

## 2017-09-18 RX ORDER — CLOPIDOGREL BISULFATE 75 MG/1
300 TABLET, FILM COATED ORAL ONCE
Qty: 0 | Refills: 0 | Status: COMPLETED | OUTPATIENT
Start: 2017-09-18 | End: 2017-09-18

## 2017-09-18 RX ORDER — HYDROMORPHONE HYDROCHLORIDE 2 MG/ML
0.25 INJECTION INTRAMUSCULAR; INTRAVENOUS; SUBCUTANEOUS ONCE
Qty: 0 | Refills: 0 | Status: DISCONTINUED | OUTPATIENT
Start: 2017-09-18 | End: 2017-09-18

## 2017-09-18 RX ORDER — ACETAMINOPHEN 500 MG
650 TABLET ORAL ONCE
Qty: 0 | Refills: 0 | Status: COMPLETED | OUTPATIENT
Start: 2017-09-18 | End: 2017-09-18

## 2017-09-18 RX ORDER — SODIUM CHLORIDE 0.65 %
1 AEROSOL, SPRAY (ML) NASAL THREE TIMES A DAY
Qty: 0 | Refills: 0 | Status: DISCONTINUED | OUTPATIENT
Start: 2017-09-18 | End: 2017-09-20

## 2017-09-18 RX ORDER — HEPARIN SODIUM 5000 [USP'U]/ML
1000 INJECTION INTRAVENOUS; SUBCUTANEOUS
Qty: 25000 | Refills: 0 | Status: DISCONTINUED | OUTPATIENT
Start: 2017-09-18 | End: 2017-09-18

## 2017-09-18 RX ADMIN — HYDROMORPHONE HYDROCHLORIDE 0.25 MILLIGRAM(S): 2 INJECTION INTRAMUSCULAR; INTRAVENOUS; SUBCUTANEOUS at 04:33

## 2017-09-18 RX ADMIN — PHENYLEPHRINE HYDROCHLORIDE 11.96 MICROGRAM(S)/KG/MIN: 10 INJECTION INTRAVENOUS at 00:30

## 2017-09-18 RX ADMIN — Medication 650 MILLIGRAM(S): at 17:39

## 2017-09-18 RX ADMIN — PREGABALIN 500 MICROGRAM(S): 225 CAPSULE ORAL at 12:17

## 2017-09-18 RX ADMIN — Medication 3 MILLILITER(S): at 11:53

## 2017-09-18 RX ADMIN — Medication 81 MILLIGRAM(S): at 12:17

## 2017-09-18 RX ADMIN — Medication 3 MILLILITER(S): at 23:50

## 2017-09-18 RX ADMIN — FOSPHENYTOIN 108 MILLIGRAM(S) PE: 50 INJECTION INTRAMUSCULAR; INTRAVENOUS at 18:00

## 2017-09-18 RX ADMIN — CEFEPIME 100 MILLIGRAM(S): 1 INJECTION, POWDER, FOR SOLUTION INTRAMUSCULAR; INTRAVENOUS at 05:01

## 2017-09-18 RX ADMIN — Medication 50 GRAM(S): at 08:45

## 2017-09-18 RX ADMIN — HEPARIN SODIUM 11 UNIT(S)/HR: 5000 INJECTION INTRAVENOUS; SUBCUTANEOUS at 23:47

## 2017-09-18 RX ADMIN — Medication 100 MILLIGRAM(S): at 12:17

## 2017-09-18 RX ADMIN — HYDROMORPHONE HYDROCHLORIDE 0.25 MILLIGRAM(S): 2 INJECTION INTRAMUSCULAR; INTRAVENOUS; SUBCUTANEOUS at 00:40

## 2017-09-18 RX ADMIN — HYDROMORPHONE HYDROCHLORIDE 0.25 MILLIGRAM(S): 2 INJECTION INTRAMUSCULAR; INTRAVENOUS; SUBCUTANEOUS at 00:55

## 2017-09-18 RX ADMIN — Medication 0.4 MILLIGRAM(S): at 00:57

## 2017-09-18 RX ADMIN — HEPARIN SODIUM 10 UNIT(S)/HR: 5000 INJECTION INTRAVENOUS; SUBCUTANEOUS at 02:10

## 2017-09-18 RX ADMIN — Medication 100 MILLIGRAM(S): at 05:11

## 2017-09-18 RX ADMIN — Medication 3 MILLILITER(S): at 01:40

## 2017-09-18 RX ADMIN — FOSPHENYTOIN 108 MILLIGRAM(S) PE: 50 INJECTION INTRAMUSCULAR; INTRAVENOUS at 05:10

## 2017-09-18 RX ADMIN — Medication 150 MILLIGRAM(S): at 05:07

## 2017-09-18 RX ADMIN — Medication 0.5 MILLIGRAM(S): at 12:59

## 2017-09-18 RX ADMIN — Medication 1 MILLIGRAM(S): at 12:18

## 2017-09-18 RX ADMIN — CEFEPIME 100 MILLIGRAM(S): 1 INJECTION, POWDER, FOR SOLUTION INTRAMUSCULAR; INTRAVENOUS at 17:27

## 2017-09-18 RX ADMIN — HYDROMORPHONE HYDROCHLORIDE 0.25 MILLIGRAM(S): 2 INJECTION INTRAMUSCULAR; INTRAVENOUS; SUBCUTANEOUS at 04:18

## 2017-09-18 RX ADMIN — Medication 100 MILLIGRAM(S): at 13:06

## 2017-09-18 RX ADMIN — Medication 100 MILLIGRAM(S): at 22:01

## 2017-09-18 RX ADMIN — Medication 125 MILLILITER(S): at 01:00

## 2017-09-18 RX ADMIN — Medication 1000 MILLIGRAM(S): at 00:30

## 2017-09-18 RX ADMIN — Medication 1.5 MICROGRAM(S)/MIN: at 04:38

## 2017-09-18 RX ADMIN — Medication 125 MILLILITER(S): at 01:20

## 2017-09-18 RX ADMIN — Medication 10 MILLIGRAM(S): at 05:05

## 2017-09-18 RX ADMIN — CLOPIDOGREL BISULFATE 300 MILLIGRAM(S): 75 TABLET, FILM COATED ORAL at 09:12

## 2017-09-18 RX ADMIN — Medication 4 MILLIGRAM(S): at 17:28

## 2017-09-18 RX ADMIN — Medication 400 MILLIGRAM(S): at 00:00

## 2017-09-18 RX ADMIN — Medication 0.4 MILLIGRAM(S): at 01:43

## 2017-09-18 RX ADMIN — Medication 0.4 MILLIGRAM(S): at 02:49

## 2017-09-18 RX ADMIN — Medication 3 MILLILITER(S): at 17:33

## 2017-09-18 NOTE — DIETITIAN INITIAL EVALUATION ADULT. - OTHER INFO
Nutrition Assessment warranted for length of stay in SICU. Pt S/P esophagogastrectomy (partial) with intra-thoracic esophagogastric anastomosis ([place]-Trent procedure) on 8/14/17 - post-op complicated by anastomotic leak, discharged to [facility] on 8/27 with R chest drain in place. He was admitted from rehab overnight with clogged jejunostomy feeding tube.

## 2017-09-18 NOTE — PROCEDURE NOTE - NSPROCDETAILS_GEN_ALL_CORE
sutured in place/location identified, draped/prepped, sterile technique used, needle inserted/introduced/Seldinger technique/all materials/supplies accounted for at end of procedure/positive blood return obtained via catheter/ultrasound guidance/connected to a pressurized flush line
sutured in place/positive blood return obtained via catheter/connected to a pressurized flush line/location identified, draped/prepped, sterile technique used, needle inserted/introduced/Seldinger technique/all materials/supplies accounted for at end of procedure
secured in place/percutaneous/sterile dressing applied/supine position/ultrasound assessment of fluid (location)/Seldinger technique/dressing applied

## 2017-09-18 NOTE — PROGRESS NOTE ADULT - ATTENDING COMMENTS
Agree with above  abx as per ID  f/u thoracic re: right chest tube  possible repeat chest CT to reeval loculations

## 2017-09-18 NOTE — PROGRESS NOTE ADULT - ASSESSMENT
82 M presents with non-functioning J tube and SOB.  Pt diagnosed with esophageal adenocarcinoma s/p tx with chemo and radiation therapy. 8/14/17 Patient  underwent a robotic-assisted [place]-T[place] esophagogastrectomy with feeding J tube placement for esophageal adenocarcinoma ([id number]). Pt with esophageal leak-R chest tube-suction bulb. P/W non function JTube-flushed-now working. Dyspnea, hypoxia,leukocytosis, Left pleural effusion with concern for esophageal leak.  Status post pigtail catheter placement and currently feeling much better.  developed NSTEMI with hx of cad and known stents, L plueral effusion = transudate effusion

## 2017-09-18 NOTE — PROGRESS NOTE ADULT - ATTENDING COMMENTS
Patient seen and examined and agree with above. Overnight the patient complained of chest pain in the center of his chest as if there was something heavy sitting on his chest. He also acutely became hypotensive. He was given IVF bolus with appropriate response. HIs troponins also were elevated at 0.4 which is a change from his prior labs. At this point on critical care ultrasound he appeared to have a small pericardial effusion which was consistent from the TTE he had earlier that day. Cardiology evaluated the patient and diagnosed him with an NSTEMI. Of note he has bare metal stents recently. The patient was started on plavix and heparin drip with goal PTT 60-90.  He was given nitroglycerin which resolved his chest pain. His hypotension continued and he was placed on HOWARD drip.  Goal map >65 mmHg.  Will continue to wean as tolerated.  The patient also became hypoxic and required more supplemental oxygen necessitating high flow.   Will continue to keep goal SpO2>92%.    CC time: 52 minutes Patient seen and examined and agree with above. Overnight the patient complained of chest pain in the center of his chest as if there was something heavy sitting on his chest. He also acutely became hypotensive. He was given IVF bolus with appropriate response. HIs troponins also were elevated at 0.4 which is a change from his prior labs. At this point on critical care ultrasound he appeared to have a small pericardial effusion which was consistent from the TTE he had earlier that day. Cardiology evaluated the patient and diagnosed him with an NSTEMI. Of note he has bare metal stents recently. The patient was started on plavix and heparin drip with goal PTT 60-90.  He was given nitroglycerin which resolved his chest pain. His hypotension continued and he was placed on HOWARD drip.  Goal map >65 mmHg.  Will continue to wean as tolerated.  The patient also became hypoxic and required more supplemental oxygen necessitating high flow.   Will continue to keep goal SpO2>92%.  I discussed this with Dr. Cohen.     CC time: 52 minutes

## 2017-09-18 NOTE — PROGRESS NOTE ADULT - ATTENDING COMMENTS
Spoke with Dr. Coelho overnight.  Patient having crushing chest pain and there is concern for MI.  Will start plavix as per cards.  Hold on esophagram for now.  Continue chest tube to wall suction  Appreciate SICU care.

## 2017-09-18 NOTE — CONSULT NOTE ADULT - PROVIDER SPECIALTY LIST ADULT
Gastroenterology
Infectious Disease
Internal Medicine
Pulmonology
SICU
Surgery
Thoracic Surgery
Cardiology

## 2017-09-18 NOTE — PROGRESS NOTE ADULT - ATTENDING COMMENTS
Juancarlos Donnelly  Attending Physician   Division of Infectious Disease  Pager #[phone]  After 5pm/weekend #[phone]

## 2017-09-18 NOTE — PROGRESS NOTE ADULT - PROBLEM SELECTOR PLAN 5
+ NSEMI  prior cath with cad  with PCI and 3 stents placed  echo : mod as, mild diastolic dysfunction  c/w heparin gtt  plan as per sicu, cards on board + NSTEMI  prior cath with cad  with PCI and 3 stents placed  echo : mod as, mild diastolic dysfunction  c/w heparin gtt  plan as per sicu, cards on board

## 2017-09-18 NOTE — CONSULT NOTE ADULT - PROBLEM SELECTOR RECOMMENDATION 2
May be secondary to volume depletion, LV appears hyperdynamic on unofficial bedside echo. Recommend Albumin 5% 250cc x 2.  Repeat Hb 9.0  TTE bedside technically difficult, small pericardial effusion, no sign of tamponade, repeat echo if clinically indicated.  Pressors for hemodynamic support, maintain SBP>90mmHG, MAP>65mmHG

## 2017-09-18 NOTE — DIETITIAN INITIAL EVALUATION ADULT. - FACTORS AFF FOOD INTAKE
Pt NPO with EN via J-tube, previously infusing @ 60ml/hr x 24 hours; 24-hour provision = 1150ml. Pt now NPO; TF stopped this morning. +BM 9/17

## 2017-09-18 NOTE — CONSULT NOTE ADULT - CONSULT REASON
Non-functioning J tube and SOB
SOB/Clogged J tube
Worsened L pleural effusion, persistent R pleural effusion, anastomotic leak after [place]-Trent
esopahgeal cancer
shortness of breath
sob
sob  L pleural effusion
elevated troponin

## 2017-09-18 NOTE — DIETITIAN INITIAL EVALUATION ADULT. - NS AS NUTRI INTERV ENTERAL NUTRITION
As medically feasible, resume Vital 1.2 @ 60ml/hr x 24 hours to provide 1440 ml formula, 1728cal/day (27cal/Kg), 108Gm protein/day (1.7Gm/Kg), 1168ml free water per dosing wt 63.8Kg.

## 2017-09-18 NOTE — PROGRESS NOTE ADULT - SUBJECTIVE AND OBJECTIVE BOX
SUBJ:  Shortness of breath improved since left sided thoracentesis. Denies chest pain and shortness of breath. Remains of phenylephrine for hypotension. Transthoracic ECHO demonstrating EF 50%, small pericardial effusion without evidence of tamponade. Troponin with mild fluctuations.     MEDICATIONS  (STANDING):  ALBUTerol/ipratropium for Nebulization 3 milliLiter(s) Nebulizer every 6 hours  influenza   Vaccine 0.5 milliLiter(s) IntraMuscular once  cefepime  IVPB 1000 milliGRAM(s) IV Intermittent every 12 hours  metroNIDAZOLE  IVPB 500 milliGRAM(s) IV Intermittent every 8 hours  vancomycin  IVPB 750 milliGRAM(s) IV Intermittent every 12 hours  cyanocobalamin Injectable 500 MICROGram(s) SubCutaneous daily  pyridoxine Injectable 100 milliGRAM(s) IV Push daily  folic acid Injectable 1 milliGRAM(s) IV Push daily  fosphenytoin IVPB 200 milliGRAM(s) PE IV Intermittent every 12 hours  methylPREDNISolone sodium succinate Injectable 4 milliGRAM(s) IV Push every 24 hours  phenylephrine    Infusion 0.5 MICROgram(s)/kG/Min (11.963 mL/Hr) IV Continuous <Continuous>  aspirin  chewable 81 milliGRAM(s) Oral daily  heparin  Infusion 1000 Unit(s)/Hr (11 mL/Hr) IV Continuous <Continuous>    MEDICATIONS  (PRN):  LORazepam   Injectable 0.5 milliGRAM(s) IV Push every 12 hours PRN Anxiety  sodium chloride 0.65% Nasal 1 Spray(s) Both Nostrils three times a day PRN Nasal Congestion    Vital Signs Last 24 Hrs  T(C): 36.8 (18 Sep 2017 15:00), Max: 36.9 (17 Sep 2017 19:00)  T(F): 98.2 (18 Sep 2017 15:00), Max: 98.5 (17 Sep 2017 19:00)  HR: 100 (18 Sep 2017 17:00) (96 - 109)  BP: 102/69 (18 Sep 2017 13:30) (90/56 - 126/63)  BP(mean): 81 (18 Sep 2017 13:30) (67 - 89)  RR: 28 (18 Sep 2017 17:00) (21 - 39)  SpO2: 100% (18 Sep 2017 17:00) (87% - 100%)    REVIEW OF SYSTEMS:  CONSTITUTIONAL: Positive fatigue and weakness  EYES: No eye pain  ENMT:  No difficulty hearing, tinnitus  NECK: No pain or stiffness  RESPIRATORY: Positive shortness of breath, cough  CARDIOVASCULAR: No chest pain and trace leg swelling  GASTROINTESTINAL: No abdominal or epigastric pain. Positive melena.  GENITOURINARY: No dysuria, frequency  NEUROLOGICAL: No headaches, memory loss  SKIN: No itching, burning, rashes, or lesions   LYMPH NODES: No enlarged glands  ENDOCRINE: No heat or cold intolerance  MUSCULOSKELETAL: No joint pain or swelling  HEME/LYMPH: No easy bruising  ALLERY AND IMMUNOLOGIC: No hives or eczema    PHYSICAL EXAM:  · CONSTITUTIONAL: cachectic    · EYES: EOMI; PERRL; no drainage or redness  · NECK: no thyromegaly or nodules  ·RESPIRATORY:   airway patent; reduced breath sounds, respirations somewhat labored; Positive crackles bibasilar.   · CARDIOVASCULAR: tachycardic   . GASTROINTESTINAL:  no distention  · EXTREMITIES: No cyanosis, clubbing. Trace edema  · VASCULAR:  Equal and normal pulses (radial, femoral)  ·NEUROLOGICAL:  Alert and oriented x 3;   · SKIN: No lesions; no rash  . LYMPH NODES: No lymphadedenopathy  · MUSCULOSKELETAL:  No calf tenderness  no joint swelling	    TELEMETRY: NSR    TTE 9/18/2017:  CONCLUSIONS:  1. Mitral annular calcification and calcified mitral  leaflets with normal diastolic opening.  2. Calcified aortic valve with decreased opening.  3. Low Normal left ventricular systolic function. EF 50%  4. The right ventricle is not well visualized; grossly  normal right ventricular systolic function.  5. Thickened  pericardium with small  0.9cm circumferential  pericardial effusion.There is evidence of raised intra  pericardial pressure with RA buckling however no  tamponade seen.  6. EF 50%    LABS:   CBC Full  -  ( 18 Sep 2017 12:54 )  WBC Count : 18.0 K/uL  Hemoglobin : 10.3 g/dL  Hematocrit : 31.3 %  Platelet Count - Automated : 406 K/uL  Mean Cell Volume : 95.9 fl  Mean Cell Hemoglobin : 31.7 pg  Mean Cell Hemoglobin Concentration : 33.0 gm/dL  Auto Neutrophil # : x  Auto Lymphocyte # : x  Auto Monocyte # : x  Auto Eosinophil # : x  Auto Basophil # : x  Auto Neutrophil % : x  Auto Lymphocyte % : x  Auto Monocyte % : x  Auto Eosinophil % : x  Auto Basophil % : x    09-18    138  |  106  |  28<H>  ----------------------------<  262<H>  3.9   |  17<L>  |  0.75    Ca    8.4      18 Sep 2017 06:48  Phos  3.1     09-18  Mg     2.0     09-18    TPro  6.2  /  Alb  2.5<L>  /  TBili  0.6  /  DBili  0.4<H>  /  AST  34  /  ALT  27  /  AlkPhos  248<H>  09-18    CARDIAC MARKERS ( 18 Sep 2017 12:54 )  x     / 0.42 ng/mL / 39 U/L / x     / 5.5 ng/mL  CARDIAC MARKERS ( 18 Sep 2017 06:48 )  x     / 0.30 ng/mL / 28 U/L / x     / 4.0 ng/mL  CARDIAC MARKERS ( 18 Sep 2017 00:56 )  x     / 0.36 ng/mL / 29 U/L / x     / 4.2 ng/mL  CARDIAC MARKERS ( 17 Sep 2017 22:53 )  x     / 0.40 ng/mL / 38 U/L / x     / 4.1 ng/mL    IMPRESSION AND PLAN:  82 M pmhx CAD, bare metal stents to LAD placed in June 2017 and balloon angioplasty to D2, hyperlipidemia, BPH, HTN, asthma, CVA, anxiety s/p esophagogastrectomy last month, recently had left pleural effusion drained via pigtail catheter, and found to have NSTEMI in the setting of SIRS.     1) NSTEMI  Troponin fluctuating most recent 0.42 and remains in that region.  ECHO with preserved EF 50%  ECG with sinus tachycardia, RBBB.  Denies chest pain at the time of evaluation.   Prior LAD stents x3 as well as balloon angioplasty to D2.   Requiring pressors for blood pressure support; meets criteria for SIRS/sepsis criteria.    ·	Continue medical management with heparin ggt, aspirin 81 mg daily, clopidogrel 75 mg daily.  ·	Continue treating SIRS/sepsis with antibiotics as per ID, resuscitation, pressors, etc.   ·	Will continually evaluate urgency to go to cath lab but his troponin elevation is likely supply demand mismatch in the setting of SIRS/sepsis and potentially new coronary lesion (suspect prior D2 lesion that was ballooned). Furthermore, will continually monitor his Hg for stability while on heparin and DAPT therapy given his recent melanotic stool, severe anemia requiring pRBCs.   ·	Case discussed with Dr. Figueroa (Interventional Cardiologist) and Dr. Santiago (Primary Cardiologist).     2) Melana/Anemia  Hg 6.9 requiring pRBCs  Melena for several weeks; thought to be related to surgical procedure and anastomosis leak.     ·	Appreciate GI and surgerical recommendations     Jonna Coyle MD, MPH, ALLY  Cardiovascular Specialist Attending  [facility]  C: [phone] (Cardiology nocturnist available every night 7 pm - 7 am; available week days for follow-up; general cardiology consult coverage weekend days)  E: [email or web address]

## 2017-09-18 NOTE — CONSULT NOTE ADULT - ASSESSMENT
82 M pmhx CAD, bare metal stents to Diag 2 placed in June 2017, hyperlipidemia, BPH, HTN, asthma, CVA, anxiety s/p esophagogastrectomy last month, recently had left pleural effusion drained via pigtail catheter, and found to have elevated troponin with chest pressure today relieved with NTG sublingual.
82 M presents with non-functioning J tube and SOB. 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 Dyer and Dr. Christian Weinstein on 8/14/17. After surgery, patient was found to have an anastomotic leak and was treated conservatively with NPO, antibiotics, chest tube drainage, and J tube feeding. Patient was eventually discharged to rehab. On day of evaluation, patient's J tube was unable to be used, and was sent to the ED for evaluation. Patient also complains of SOB, but no chest pain or abdominal pain. Denies fever or chills.  vitals- no fever though bp was lower on presentation   labs- mild white count, stable, recent surgery   micro-awaiting bcx, recent cx were neg  radiology- ct showing increasing left pleural effusion        plan- Lets wait for the analysis of the left pleural tap- if infected then would start braod spectrum on vacno and michelle, till cx back  Pt is hem stable and no active infection per se, so would hold off abx  If there is a leak confirmed on barium, start broad spectrum Vanc and meropenem    Will maheshw attending
82 M with recent [place]-Trent esophagogastrectomy for esophageal adenocarcinoma presents with non-functioning J tube and SOB. J tube was unclogged at bedside. Given known leak, will evaluate chest.  -F/u CT chest with IV and oral contrast  -J tube can be used now  -If no major issues on CT chest, patient can return to rehab  TUCKER Jiang  [id number]
ASSESSMENT: 82yMale with 2y M, s/p [place]-Trent, possible persistent anastomotic leak, possible infected pleural effusion(s). Jejunostomy feeding tube unclogged by general surgery.     PLAN:   Neurologic:      Respiratory:  Maintain on Non-rebreather, Keep sat >90%, try to titrate to venti mask 55%  Received empiric abx in ED, hold for now until cultures return  Chest tube L to drain effusion  Send cultures of drainage for analysis, culture, cell count and cytology   Duoneb's/Albuterol PRN    Cardiovascular:  Continue BP meds with hold parameters   Vital Signs Q-4    Gastrointestinal/Nutrition:  Keep NPO  J tube can be used now    Genitourinary/Renal:  Strict I's and O's   LR @75/hr      Hematologic:  Lovenox for DVT ppx     Infectious Disease:  Hold abx for now  Trend Leukocytosis   Culture Pleural effusion, will FU    Endocrine:    Disposition: SICU
PT WITH ESOPHAGEAL CA,  S/P  SURGERY,   NOW  WITH  SOB, RESOLVED,  WITH CHEST TUBES, SEEN BY PULM    H/O  ANEMIA, HTN. CAD, RECENT STENTS    WIFE  AT BEDSIDE   PT DOING  WELL, DVT PPX   FOLLOW  HB IN AM   CARE PER  SICU
patient seen at rehab.  He had shortness of breath which seemed to have improved with tfx. Now with effusion and clogged g tube. for evaluation of effusion ? barrium study for prior leak.  appreciate thoracentesis.  patient s/p chemo radiation with t1a tumor on resection.
82 M presents with non-functioning J tube and SOB.  Pt diagnosed with esophageal adenocarcinoma s/p tx with chemo and radiation therapy. 8/14/17 Patient  underwent a robotic-assisted [place]-Trent esophagogastrectomy with feeding J tube placement for esophageal adenocarcinoma (T1aN0). Pt with esophageal leak-R chest tube-suction bulb. P/W non function JTube-flushed-now working. Dyspnea, hypoxia,leukocytosis, Left pleural effusion with concern for esophageal leak.

## 2017-09-18 NOTE — CONSULT NOTE ADULT - PROBLEM SELECTOR RECOMMENDATION 9
OWEN risk score 5, 14 day risk of death, recurrent MI or urgent revascularization 26.2%. OWEN risk score 5, 14 day risk of death, recurrent MI or urgent revascularization 26.2%.  Recommend IV heparin for 48hrs, start plavix 75mg po daily, lipitor 40mg po daily, metoprolol 12.5mg po BID, (hold sbp<90mmHG, HR<60BPM), ASA 81mg po daily  Recommend load plavix 300mg po one time if okay with surgical team.   NTG sublingual PRN for angina.  Repeat EKG in AM

## 2017-09-18 NOTE — PROCEDURE NOTE - PROCEDURE
<<-----Click on this checkbox to enter Procedure Arterial line placement  09/18/2017    Active  ROBBIE

## 2017-09-18 NOTE — CONSULT NOTE ADULT - PROBLEM SELECTOR RECOMMENDATION 4
h/o PCI with bare metal stent in D2, continue medical managment
elevated wbc  s/p pan cx  received empiric abx in er vanco, cefepime and flagyl  send pelural fluid for analysis, culture, cellcount,cytology  concern elevated wbc may be d/t leak

## 2017-09-18 NOTE — DIETITIAN INITIAL EVALUATION ADULT. - ENERGY NEEDS
ht: 5 feet 3 inches, wt: 141 pounds, BMI: 24.9 Kg/m2, IBW: 124 pounds (+/- 10%), 114% IBW. Edema: 1+ generalized; Skin: no pressure injuries.

## 2017-09-18 NOTE — PROGRESS NOTE ADULT - SUBJECTIVE AND OBJECTIVE BOX
MEJIA LATIF:[id number],   82yMale followed for: esophageal cancer  penicillin (Rash)    PAST MEDICAL & SURGICAL HISTORY:  Coronary artery disease: s/p 3 stents on June 30, 2017 at [facility] Lavelle Reid  OA (osteoarthritis) of knee: right  Former smoker, stopped smoking in distant past  Rheumatoid arthritis  Seizure disorder: 1 episode approximately 15 yrs ago  Asthma  Hyperlipidemia  BPH (benign prostatic hyperplasia)  HTN (hypertension)  Esophagus cancer  Chronic allergic rhinitis  BCC (basal cell carcinoma): skin  Cerebrovascular accident (CVA)  Anxiety  History of tonsillectomy  H/O heart artery stent: June 30, 2017  Knee arthropathy: left  History of total hip replacement: left  History of hernia repair    FAMILY HISTORY:  Family history of heart attack (Sibling)  Family history of pulmonary embolism: father @ 49 yr old  FH: CAD (coronary artery disease)    MEDICATIONS  (STANDING):  ALBUTerol/ipratropium for Nebulization 3 milliLiter(s) Nebulizer every 6 hours  influenza   Vaccine 0.5 milliLiter(s) IntraMuscular once  cefepime  IVPB 1000 milliGRAM(s) IV Intermittent every 12 hours  metroNIDAZOLE  IVPB 500 milliGRAM(s) IV Intermittent every 8 hours  vancomycin  IVPB 750 milliGRAM(s) IV Intermittent every 12 hours  cyanocobalamin Injectable 500 MICROGram(s) SubCutaneous daily  pyridoxine Injectable 100 milliGRAM(s) IV Push daily  folic acid Injectable 1 milliGRAM(s) IV Push daily  fosphenytoin IVPB 200 milliGRAM(s) PE IV Intermittent every 12 hours  methylPREDNISolone sodium succinate Injectable 4 milliGRAM(s) IV Push every 24 hours  phenylephrine    Infusion 0.5 MICROgram(s)/kG/Min (11.963 mL/Hr) IV Continuous <Continuous>  heparin  Infusion 1000 Unit(s)/Hr (10 mL/Hr) IV Continuous <Continuous>  clopidogrel Tablet 300 milliGRAM(s) Oral once  aspirin  chewable 81 milliGRAM(s) Oral daily    MEDICATIONS  (PRN):      Vital Signs Last 24 Hrs  T(C): 36.8 (18 Sep 2017 03:00), Max: 36.9 (17 Sep 2017 19:00)  T(F): 98.2 (18 Sep 2017 03:00), Max: 98.5 (17 Sep 2017 19:00)  HR: 99 (18 Sep 2017 08:00) (88 - 109)  BP: 101/65 (18 Sep 2017 08:00) (90/56 - 126/63)  BP(mean): 79 (18 Sep 2017 08:00) (67 - 89)  RR: 30 (18 Sep 2017 08:00) (21 - 36)  SpO2: 100% (18 Sep 2017 08:00) (92% - 100%)  nc/at  s1s2  cta  soft, nt, nd no guarding or rebound  no c/c/e    CBC Full  -  ( 18 Sep 2017 06:48 )  WBC Count : 18.5 K/uL  Hemoglobin : 8.4 g/dL  Hematocrit : 25.0 %  Platelet Count - Automated : 418 K/uL  Mean Cell Volume : 96.6 fl  Mean Cell Hemoglobin : 32.4 pg  Mean Cell Hemoglobin Concentration : 33.5 gm/dL  Auto Neutrophil # : x  Auto Lymphocyte # : x  Auto Monocyte # : x  Auto Eosinophil # : x  Auto Basophil # : x  Auto Neutrophil % : x  Auto Lymphocyte % : x  Auto Monocyte % : x  Auto Eosinophil % : x  Auto Basophil % : x    09-18    138  |  106  |  28<H>  ----------------------------<  262<H>  3.9   |  17<L>  |  0.75    Ca    8.4      18 Sep 2017 06:48  Phos  3.1     09-18  Mg     2.0     09-18    TPro  6.2  /  Alb  2.5<L>  /  TBili  0.6  /  DBili  0.4<H>  /  AST  34  /  ALT  27  /  AlkPhos  248<H>  09-18    PT/INR - ( 18 Sep 2017 03:29 )   PT: 18.2 sec;   INR: 1.67 ratio         PTT - ( 18 Sep 2017 03:29 )  PTT:30.6 sec

## 2017-09-18 NOTE — DIETITIAN INITIAL EVALUATION ADULT. - ORAL INTAKE PTA
Pt/family interview pending. Pt noted with clogged feeding J-tube S/P 8/14 [place] procedure. Per chart review, pt takes no nutrition supplements, reports NKFA. Pt/family interview pending. Pt noted with clogged feeding J-tube S/P 8/14 [place] procedure. Per chart review, pt takes vit B6, vit B12 and folic acid; reports NKFA.

## 2017-09-18 NOTE — PROGRESS NOTE ADULT - SUBJECTIVE AND OBJECTIVE BOX
SURGICAL ONCOLOGY NOTE    Patient experienced chest pressure/pain overnight, tachycardia, cardiac enzymes elevated --> diagnosed with NSTEMI --> heparin gtt, nitroglycerin started, phenylephrine started. Patient reports that chest pain has improved as of this morning, he remains SOB. No abdominal pain, no N/V, passing flatus.      MEDICATIONS  (STANDING):  ALBUTerol/ipratropium for Nebulization 3 milliLiter(s) Nebulizer every 6 hours  influenza   Vaccine 0.5 milliLiter(s) IntraMuscular once  cefepime  IVPB 1000 milliGRAM(s) IV Intermittent every 12 hours  metroNIDAZOLE  IVPB 500 milliGRAM(s) IV Intermittent every 8 hours  vancomycin  IVPB 750 milliGRAM(s) IV Intermittent every 12 hours  cyanocobalamin Injectable 500 MICROGram(s) SubCutaneous daily  pyridoxine Injectable 100 milliGRAM(s) IV Push daily  folic acid Injectable 1 milliGRAM(s) IV Push daily  fosphenytoin IVPB 200 milliGRAM(s) PE IV Intermittent every 12 hours  methylPREDNISolone sodium succinate Injectable 4 milliGRAM(s) IV Push every 24 hours  phenylephrine    Infusion 0.5 MICROgram(s)/kG/Min (11.963 mL/Hr) IV Continuous <Continuous>  heparin  Infusion 1000 Unit(s)/Hr (10 mL/Hr) IV Continuous <Continuous>  aspirin  chewable 81 milliGRAM(s) Oral daily    MEDICATIONS  (PRN):      VITALS & I/Os:  Vital Signs Last 24 Hrs  T(C): 36.8 (18 Sep 2017 03:00), Max: 36.9 (17 Sep 2017 19:00)  T(F): 98.2 (18 Sep 2017 03:00), Max: 98.5 (17 Sep 2017 19:00)  HR: 101 (18 Sep 2017 10:00) (92 - 109)  BP: 108/69 (18 Sep 2017 08:30) (90/56 - 126/63)  BP(mean): 83 (18 Sep 2017 08:30) (67 - 89)  RR: 33 (18 Sep 2017 10:00) (21 - 36)  SpO2: 100% (18 Sep 2017 10:00) (87% - 100%)  CAPILLARY BLOOD GLUCOSE          I&O's Summary    17 Sep 2017 07:01  -  18 Sep 2017 07:00  --------------------------------------------------------  IN: 3150.8 mL / OUT: 735 mL / NET: 2415.8 mL    18 Sep 2017 07:01  -  18 Sep 2017 10:08  --------------------------------------------------------  IN: 117.8 mL / OUT: 0 mL / NET: 117.8 mL          GEN: NAD, alert and oriented x 3  HEENT: WNL  CHEST: Left pigtail in place. Symmetrical chest rise, increased work of expiration.  ABD: Soft, minimal distension, no tenderness      LABS:                        8.4    18.5  )-----------( 418      ( 18 Sep 2017 06:48 )             25.0     18    138  |  106  |  28<H>  ----------------------------<  262<H>  3.9   |  17<L>  |  0.75    Ca    8.4      18 Sep 2017 06:48  Phos  3.1       Mg     2.0         TPro  6.2  /  Alb  2.5<L>  /  TBili  0.6  /  DBili  0.4<H>  /  AST  34  /  ALT  27  /  AlkPhos  248<H>  18    Lactate:  PT/INR - ( 18 Sep 2017 03:29 )   PT: 18.2 sec;   INR: 1.67 ratio         PTT - ( 18 Sep 2017 03:29 )  PTT:30.6 sec  ABG - ( 18 Sep 2017 06:44 )  pH: 7.39  /  pCO2: 35    /  pO2: 162   / HCO3: 21    / Base Excess: -2.9  /  SaO2: 100               CARDIAC MARKERS ( 18 Sep 2017 06:48 )  x     / 0.30 ng/mL / 28 U/L / x     / 4.0 ng/mL  CARDIAC MARKERS ( 18 Sep 2017 00:56 )  x     / 0.36 ng/mL / 29 U/L / x     / 4.2 ng/mL  CARDIAC MARKERS ( 17 Sep 2017 22:53 )  x     / 0.40 ng/mL / 38 U/L / x     / 4.1 ng/mL       @ 23:15  1.5    Urinalysis Basic - ( 18 Sep 2017 06:06 )    Color: Yellow / Appearance: x / S.021 / pH: x  Gluc: x / Ketone: Negative  / Bili: Negative / Urobili: 1   Blood: x / Protein: 30 mg/dL / Nitrite: Negative   Leuk Esterase: Trace / RBC: x / WBC 2-5 /HPF   Sq Epi: x / Non Sq Epi: x / Bacteria: Few /HPF        IMAGING:  < from: Limited Transthoracic Echo (17 @ 01:02) >  PROCEDURE: Limited transthoracic echocardiogram with 2-D.  M-Mode and spectral and color flow Doppler.  INDICATION: Hypotension, unspecified (I95.9)  ------------------------------------------------------------------------  OBSERVATIONS:  Pericardium/PleuraSmall pericardial effusion measuring up  to approximately 0.8 cm inferiorto the right heart. No  pericardial tamponade but unable to exclude findings  consistent with increased pericardial pressures based on  limited images. No right atrial or right ventricular  collapse.  ------------------------------------------------------------------------  CONCLUSIONS:  Limited portable study to evaluate the pericardium.  1. Small pericardial effusion measuring up to approximately  0.8 cm inferior to the right heart. No pericardial  tamponade but unable to exclude findings consistent with  increased pericardial pressures based on limited images. No  right atrial or right ventricular collapse.  Consider interval limited follow-up echo to evaluate the  pericardium if clinically indicated.  *** Compared with echocardiogram of 2017, a small  inferior pericardial effusion is noted on today's study.    < end of copied text >      ASSESSMENT & PLAN  82y M, s/p Johnson approximately 1 month previous c/b by anastomotic leak, b/l pleural effusions and respiratory distress (s/p L pigtail), pericardial effusion (seen on admission CT), and NSTEMI.  - Per attending, hold off on esophagram for now  - Continue heparin gtt, nitro, per Cardiology/SICU  - Continue antibiotics, f/u cultures  - Concern remains for infection of pleural/pericardial effusion(s) due to communication with anastomotic leak. So far, L pleural fluid culture with no growth. Will discuss prospect of R chest (additional) drainage with Thoracic surgery.  - Oxygen supplementation, nebulizer therapy    Discussed with Dr. Cohen      Discussed with

## 2017-09-18 NOTE — PROGRESS NOTE ADULT - ASSESSMENT
82M w/ respiratory distress secondary to worsened left pleural effusion, s/p [place]-Trent, complicated by anastomotic l  H/OCAD, STENT 7/ 17    LEFT CHEST TUBE  AND  RIGHT , PLEUREX  S/P  CP  WITH  HYPOTENSION  LAST NIGHT, WITH  MARGINAL TROPONINS   PT FEELS  FINE TODAY  SEEN BY CARD  ON ASA/  PLAVIX/  LOPRESSOR, IV HEPARIN  ON IV CEFEPIME/ VANCO/ FLAGYL  ESOPHAGRAM DEFERRED   AT PRESENT

## 2017-09-18 NOTE — PROGRESS NOTE ADULT - SUBJECTIVE AND OBJECTIVE BOX
SUBJECTIVE / OVERNIGHT EVENTS:  had  cp. and  hypotension      CAPILLARY BLOOD GLUCOSE        I&O's Summary    17 Sep 2017 07:01  -  18 Sep 2017 07:00  --------------------------------------------------------  IN: 3150.8 mL / OUT: 735 mL / NET: 2415.8 mL    18 Sep 2017 07:  -  18 Sep 2017 10:48  --------------------------------------------------------  IN: 117.8 mL / OUT: 0 mL / NET: 117.8 mL        PHYSICAL EXAM:  HEAD:  Atraumatic, Normocephalic  EYES: EOMI, PERRLA, conjunctiva and sclera clear  NECK: Supple, No JVD  CHEST/LUNG:left  chest tube, right pleurex  HEART: Regular rate and rhythm; No murmurs, rubs, or gallops  ABDOMEN: Soft, Nontender, Nondistended; Bowel sounds present  EXTREMITIES:  2+ Peripheral Pulses, No clubbing, cyanosis, or edema  PSYCH: AAOx3  NEUROLOGY: non-focal  SKIN: No rashes or lesions    MEDICATIONS  (STANDING):  ALBUTerol/ipratropium for Nebulization 3 milliLiter(s) Nebulizer every 6 hours  influenza   Vaccine 0.5 milliLiter(s) IntraMuscular once  cefepime  IVPB 1000 milliGRAM(s) IV Intermittent every 12 hours  metroNIDAZOLE  IVPB 500 milliGRAM(s) IV Intermittent every 8 hours  vancomycin  IVPB 750 milliGRAM(s) IV Intermittent every 12 hours  cyanocobalamin Injectable 500 MICROGram(s) SubCutaneous daily  pyridoxine Injectable 100 milliGRAM(s) IV Push daily  folic acid Injectable 1 milliGRAM(s) IV Push daily  fosphenytoin IVPB 200 milliGRAM(s) PE IV Intermittent every 12 hours  methylPREDNISolone sodium succinate Injectable 4 milliGRAM(s) IV Push every 24 hours  phenylephrine    Infusion 0.5 MICROgram(s)/kG/Min (11.963 mL/Hr) IV Continuous <Continuous>  aspirin  chewable 81 milliGRAM(s) Oral daily  heparin  Infusion 1000 Unit(s)/Hr (10 mL/Hr) IV Continuous <Continuous>    MEDICATIONS  (PRN):      LABS:                        8.4    18.5  )-----------( 418      ( 18 Sep 2017 06:48 )             25.0         138  |  106  |  28<H>  ----------------------------<  262<H>  3.9   |  17<L>  |  0.75    Ca    8.4      18 Sep 2017 06:48  Phos  3.1       Mg     2.0         TPro  6.2  /  Alb  2.5<L>  /  TBili  0.6  /  DBili  0.4<H>  /  AST  34  /  ALT  27  /  AlkPhos  248<H>      PT/INR - ( 18 Sep 2017 03:29 )   PT: 18.2 sec;   INR: 1.67 ratio         PTT - ( 18 Sep 2017 03:29 )  PTT:30.6 sec  CARDIAC MARKERS ( 18 Sep 2017 06:48 )  x     / 0.30 ng/mL / 28 U/L / x     / 4.0 ng/mL  CARDIAC MARKERS ( 18 Sep 2017 00:56 )  x     / 0.36 ng/mL / 29 U/L / x     / 4.2 ng/mL  CARDIAC MARKERS ( 17 Sep 2017 22:53 )  x     / 0.40 ng/mL / 38 U/L / x     / 4.1 ng/mL      Urinalysis Basic - ( 18 Sep 2017 06:06 )    Color: Yellow / Appearance: x / S.021 / pH: x  Gluc: x / Ketone: Negative  / Bili: Negative / Urobili: 1   Blood: x / Protein: 30 mg/dL / Nitrite: Negative   Leuk Esterase: Trace / RBC: x / WBC 2-5 /HPF   Sq Epi: x / Non Sq Epi: x / Bacteria: Few /HPF        ABG - ( 18 Sep 2017 06:44 )  pH: 7.39  /  pCO2: 35    /  pO2: 162   / HCO3: 21    / Base Excess: -2.9  /  SaO2: 100               - @ 23:15  3.6  27    Hemoglobin A1C, Whole Blood: 5.2 % ( @ 19:48)        Cultures:    EKG:    Radiological Studies:    Consultant(s) Notes Reviewed:      Care Discussed with Consultants/Other Providers:

## 2017-09-18 NOTE — PROGRESS NOTE ADULT - SUBJECTIVE AND OBJECTIVE BOX
MEJIA LATIF 82y [id number]    Patient is a 82y old  Male who presents with a chief complaint of feeding tube clogged s/p maryjane logan (15 Sep 2017 16:33)      Follow Up/CC:  leukocytosis    Interval History/ROS: feels ok, in sicu    Allergies    penicillin (Rash)    Intolerances        ANTIMICROBIALS:  cefepime  IVPB 1000 every 12 hours  metroNIDAZOLE  IVPB 500 every 8 hours  vancomycin  IVPB 750 every 12 hours      MEDICATIONS  (STANDING):  ALBUTerol/ipratropium for Nebulization 3 milliLiter(s) Nebulizer every 6 hours  influenza   Vaccine 0.5 milliLiter(s) IntraMuscular once  cefepime  IVPB 1000 milliGRAM(s) IV Intermittent every 12 hours  metroNIDAZOLE  IVPB 500 milliGRAM(s) IV Intermittent every 8 hours  vancomycin  IVPB 750 milliGRAM(s) IV Intermittent every 12 hours  cyanocobalamin Injectable 500 MICROGram(s) SubCutaneous daily  pyridoxine Injectable 100 milliGRAM(s) IV Push daily  folic acid Injectable 1 milliGRAM(s) IV Push daily  fosphenytoin IVPB 200 milliGRAM(s) PE IV Intermittent every 12 hours  methylPREDNISolone sodium succinate Injectable 4 milliGRAM(s) IV Push every 24 hours  phenylephrine    Infusion 0.5 MICROgram(s)/kG/Min (11.963 mL/Hr) IV Continuous <Continuous>  aspirin  chewable 81 milliGRAM(s) Oral daily  heparin  Infusion 1000 Unit(s)/Hr (10 mL/Hr) IV Continuous <Continuous>    MEDICATIONS  (PRN):  LORazepam   Injectable 0.5 milliGRAM(s) IV Push every 12 hours PRN Anxiety        Vital Signs Last 24 Hrs  T(C): 36.8 (18 Sep 2017 11:00), Max: 36.9 (17 Sep 2017 19:00)  T(F): 98.2 (18 Sep 2017 11:00), Max: 98.5 (17 Sep 2017 19:00)  HR: 100 (18 Sep 2017 12:30) (96 - 109)  BP: 116/64 (18 Sep 2017 12:15) (90/56 - 126/63)  BP(mean): 83 (18 Sep 2017 12:15) (67 - 89)  RR: 25 (18 Sep 2017 12:30) (21 - 36)  SpO2: 100% (18 Sep 2017 12:30) (87% - 100%)    CBC Full  -  ( 18 Sep 2017 06:48 )  WBC Count : 18.5 K/uL  Hemoglobin : 8.4 g/dL  Hematocrit : 25.0 %  Platelet Count - Automated : 418 K/uL  Mean Cell Volume : 96.6 fl  Mean Cell Hemoglobin : 32.4 pg  Mean Cell Hemoglobin Concentration : 33.5 gm/dL  Auto Neutrophil # : x  Auto Lymphocyte # : x  Auto Monocyte # : x  Auto Eosinophil # : x  Auto Basophil # : x  Auto Neutrophil % : x  Auto Lymphocyte % : x  Auto Monocyte % : x  Auto Eosinophil % : x  Auto Basophil % : x        138  |  106  |  28<H>  ----------------------------<  262<H>  3.9   |  17<L>  |  0.75    Ca    8.4      18 Sep 2017 06:48  Phos  3.1       Mg     2.0         TPro  6.2  /  Alb  2.5<L>  /  TBili  0.6  /  DBili  0.4<H>  /  AST  34  /  ALT  27  /  AlkPhos  248<H>      LIVER FUNCTIONS - ( 18 Sep 2017 03:29 )  Alb: 2.5 g/dL / Pro: 6.2 g/dL / ALK PHOS: 248 U/L / ALT: 27 U/L RC / AST: 34 U/L / GGT: x           Urinalysis Basic - ( 18 Sep 2017 06:06 )    Color: Yellow / Appearance: x / S.021 / pH: x  Gluc: x / Ketone: Negative  / Bili: Negative / Urobili: 1   Blood: x / Protein: 30 mg/dL / Nitrite: Negative   Leuk Esterase: Trace / RBC: x / WBC 2-5 /HPF   Sq Epi: x / Non Sq Epi: x / Bacteria: Few /HPF        MICROBIOLOGY:    Culture - Body Fluid with Gram Stain (17 @ 12:42)    Gram Stain:   No organisms seen per oil power field  polymorphonuclear leukocytes seen     per low power field  by cytocentrifuge    Specimen Source: Pleural Fl Other, Pleural Fluid    Culture Results:   No growth to date.    Culture - Blood (17 @ 15:30)    Specimen Source: .Blood Blood-Venous    Culture Results:   No growth to date.    Culture - Blood (17 @ 15:30)    Specimen Source: .Blood Blood-Venous    Culture Results:   No growth to date.    Culture - Body Fluid with Gram Stain (17 @ 00:58)    Gram Stain:   No polymorphonuclear cells seen  No organisms seen  by cytocentrifuge    Specimen Source: .Body Fluid Pleural Fluid    Culture Results:   No growth    Culture - Blood (09.15.17 @ 07:47)    Specimen Source: .Blood Blood-Venous    Culture Results:   No growth to date.    Culture - Blood (09.15.17 @ 07:46)    -  Coagulase negative Staphylococcus: Detec    Gram Stain:   Growth in anaerobic bottle: Gram Positive Cocci in Clusters  Growth in aerobic bottle:  Gram Positive Cocci in Clusters  Gram Positive Rods    Specimen Source: .Blood Blood-Peripheral    Organism: Blood Culture PCR    Culture Results:   Growth in aerobic and anaerobic bottles: Coag Negative Staphylococcus  Single set isolate, possible contaminant. Contact  Microbiology if susceptibility testing clinically  indicated.  Growth in aerobic and anaerobic bottles: Corynebacterium sp.  presumptively not jeikeium  ***Blood Panel PCR results on this specimen are available  approximately 3 hours after the Gram stain result.***  Gram stain, PCR, and/or culture results may not always  correspond due to difference in methodologies.  ************************************************************  This PCR assay was performed using "[employer]".  The following targets are tested for: Enterococcus,  vancomycin resistant enterococci, Listeria monocytogenes,  coagulase negative staphylococci, S. aureus,  methicillin resistant S. aureus, Streptococcus agalactiae  (Group B), S. pneumoniae, S. pyogenes (Group A),  Acinetobacter baumannii, Enterobacter cloacae, E. coli,  Klebsiella oxytoca, K. pneumoniae, Proteus sp.,  Serratia marcescens, Haemophilus influenzae,  Neisseria meningitidis, Pseudomonas aeruginosa, Candida  albicans, C. glabrata, C krusei, C parapsilosis,  C. tropicalis and the KPC resistance gene.    Organism Identification: Blood Culture PCR    Method Type: PCR                RADIOLOGY    Xray Chest 1 View AP -PORTABLE-Routine (17 @ 08:26) >  The heart is slightly enlarged. Bilateral pleural effusion which remain   unchanged when compared to previous study done 2017. Small right   pleural effusion. Life supporting devices are all in good position and   unchanged. No pneumothorax.

## 2017-09-18 NOTE — DIETITIAN INITIAL EVALUATION ADULT. - SOURCE
patient/wife at [facility], team rounds, medical record, prior RD note (8/18/17)/family/significant other

## 2017-09-18 NOTE — CONSULT NOTE ADULT - CONSULT REQUESTED DATE/TIME
14-Sep-2017 22:49
15-Sep-2017 10:39
15-Sep-2017 12:45
15-Sep-2017 14:57
15-Sep-2017 16:36
15-Sep-2017 16:42
16-Sep-2017
18-Sep-2017 02:58

## 2017-09-18 NOTE — CONSULT NOTE ADULT - SUBJECTIVE AND OBJECTIVE BOX
CARDIOLOGY CONSULTATION NOTE                                                                                             Consult requested by:  Dr. Ganesh Cohen    Reason for Consultation: elevated troponin/chest pressure    History obtained by: Patient and medical record     obtained: No    Chief complaint:    Patient is a 82y old  Male who presents with a chief complaint of feeding tube clogged s/p maryjane trent (15 Sep 2017 16:33)  Cardiology consult requested due to chest pressure.      HPI:  82 M pmhx CAD, bare metal stents to Diag 2 placed in June 2017, hyperlipidemia, BPH  presents with non-functioning J tube and SOB. Patient recently underwent a robotic-assisted Maryjane-Trent esophagogastrectomy with feeding J tube placement for esophageal adenocarcinoma (T1aN0) with Dr. Dyer and Dr. Christian Weinstein on 8/14/17. After surgery, patient was found to have an anastomotic leak and was treated conservatively with NPO, antibiotics, chest tube drainage, and J tube feeding. Patient was eventually discharged to rehab. On day of evaluation, patient's J tube was unable to be used, and was sent to the ED for evaluation. Patient also complains of SOB, but no chest pain or abdominal pain. Denies fever or chills. Patient was taking medications by mouth, but was only getting nutrition via his J tube. (15 Sep 2017 03:11)  On this admission, patient was found to have left pleural effusion and drained with pigtail catheter. Cardiology called for hypotension, chest pressure and found to be hypotensive and tachycardic. Bedside echo done, LV hyperdynamic and underfilled. Small pericardial effusion with no signs of tamponade. NTG sublingual given with resolution of chest pressure. EKG old RBBB, no ischemic changes.         REVIEW OF SYMPTOMS: Cardiovascular:  See HPI. +chest pressure,  No dyspnea,  No syncope,  No palpitations, No dizziness, No Orthopnea, No Paroxsymal nocturnal dyspnea   Respiratory:  mild Dyspnea, No cough,     Genitourinary:  No dysuria, no hematuria   Gastrointestinal:  No nausea, no vomiting. No diarrhea.  No abdominal pain. No dark color stool, no melena   Neurological: No headache, no dizziness, no slurred speech   Psychiatric: No agitation, no anxiety.  ALL OTHER REVIEW OF SYSTEMS ARE NEGATIVE.    ALLERGIES: Allergies    penicillin (Rash)    Intolerances      CURRENT MEDICATIONS:  MEDICATIONS  (STANDING):  ALBUTerol/ipratropium for Nebulization 3 milliLiter(s) Nebulizer every 6 hours  influenza   Vaccine 0.5 milliLiter(s) IntraMuscular once  cefepime  IVPB 1000 milliGRAM(s) IV Intermittent every 12 hours  metroNIDAZOLE  IVPB 500 milliGRAM(s) IV Intermittent every 8 hours  vancomycin  IVPB 750 milliGRAM(s) IV Intermittent every 12 hours  furosemide   Injectable 10 milliGRAM(s) IV Push daily  cyanocobalamin Injectable 500 MICROGram(s) SubCutaneous daily  pyridoxine Injectable 100 milliGRAM(s) IV Push daily  folic acid Injectable 1 milliGRAM(s) IV Push daily  fosphenytoin IVPB 200 milliGRAM(s) PE IV Intermittent every 12 hours  aspirin Suppository 300 milliGRAM(s) Rectal every 24 hours  methylPREDNISolone sodium succinate Injectable 4 milliGRAM(s) IV Push every 24 hours  dextrose 5% + sodium chloride 0.45%. 1000 milliLiter(s) (50 mL/Hr) IV Continuous <Continuous>  phenylephrine    Infusion 0.5 MICROgram(s)/kG/Min (11.963 mL/Hr) IV Continuous <Continuous>  heparin  Infusion 1000 Unit(s)/Hr (10 mL/Hr) IV Continuous <Continuous>    MEDICATIONS  (PRN):      HOME MEDICATIONS:    PAST MEDICAL HISTORY  Coronary artery disease  OA (osteoarthritis) of knee  Former smoker, stopped smoking in distant past  Former smoker  Rheumatoid arthritis  Seizure disorder  Asthma  Hyperlipidemia  BPH (benign prostatic hyperplasia)  HTN (hypertension)  Esophagus cancer  Chronic allergic rhinitis  BCC (basal cell carcinoma)  Cerebrovascular accident (CVA)  Anxiety      PAST SURGICAL HISTORY  History of tonsillectomy  H/O heart artery stent  Knee arthropathy  History of total hip replacement  History of hernia repair      FAMILY HISTORY:  Family history of heart attack (Sibling)  Family history of pulmonary embolism: father @ 49 yr old  FH: CAD (coronary artery disease)      SOCIAL HISTORY:  Denies smoking/alcohol/drugs    Vital Signs Last 24 Hrs  T(C): 36.8 (17 Sep 2017 23:00), Max: 36.9 (17 Sep 2017 19:00)  T(F): 98.3 (17 Sep 2017 23:00), Max: 98.5 (17 Sep 2017 19:00)  HR: 104 (18 Sep 2017 02:45) (88 - 109)  BP: 112/60 (18 Sep 2017 01:30) (90/56 - 129/66)  BP(mean): 77 (18 Sep 2017 01:30) (67 - 91)  RR: 36 (18 Sep 2017 02:45) (22 - 36)  SpO2: 96% (18 Sep 2017 02:45) (75% - 100%)      PHYSICAL EXAM:  Constitutional: Comfortable . No acute distress.   HEENT: Atraumatic and normcephalic , neck is supple . no JVD. No carotid bruit. PEERL   CNS: A&Ox3. No focal deficits. EOMI.   Lymph Nodes: Cervical : Not palpable.  Respiratory: CTAB  Cardiovascular: S1S2 RRR. No murmur/rubs or gallop.  Gastrointestinal: Soft non-tender and non distended . +Bowel sounds.   Extremities: No edema.   Psychiatric: Calm . no agitation.  Skin: No skin rash/ulcers visualized to face, hands or feet.    Intake and output:   09-16 @ 07:01  -  09-17 @ 07:00  --------------------------------------------------------  IN: 1610 mL / OUT: 1715 mL / NET: -105 mL    09-17 @ 07:01  -  09-18 @ 02:59  --------------------------------------------------------  IN: 2283.8 mL / OUT: 595 mL / NET: 1688.8 mL        LABS:                        9.0    14.1  )-----------( 344      ( 17 Sep 2017 23:31 )             27.1     09-17    139  |  108  |  24<H>  ----------------------------<  131<H>  3.7   |  20<L>  |  0.70    Ca    7.7<L>      17 Sep 2017 22:53  Phos  3.4     09-17  Mg     1.7     09-17      CARDIAC MARKERS ( 18 Sep 2017 00:56 )  x     / 0.36 ng/mL / 29 U/L / x     / 4.2 ng/mL  CARDIAC MARKERS ( 17 Sep 2017 22:53 )  x     / 0.40 ng/mL / 38 U/L / x     / 4.1 ng/mL    ;p-BNP=Serum Pro-Brain Natriuretic Peptide: 1515 pg/mL (09-15 @ 14:54)    PT/INR - ( 17 Sep 2017 05:05 )   PT: 17.2 sec;   INR: 1.58 ratio         PTT - ( 17 Sep 2017 05:05 )  PTT:29.7 sec      INTERPRETATION OF TELEMETRY: Reviewed by me. no events  ECG: Reviewed by me. sinus tachycardia, RBBB, no ischemic changes    RADIOLOGY & ADDITIONAL STUDIES:    X-ray:  reviewed by me.  Xray Chest 1 View AP -PORTABLE-Routine (09.17.17 @ 06:58) >  Bilateral chest tubes remain in situ.  No clear-cut bilateral pneumothoraces.    ECHO FINDINGS:   Transthoracic Echocardiogram (09.17.17 @ 16:02)   1. Mitral annular calcification, otherwise normal mitral  valve. Minimal mitral regurgitation.  2. Aortic valve not well visualized; appears to be a  calcified trileaflet valve with decreased opening. Peak  transaortic valve gradient equals 18 mm Hg, mean  transaortic valve gradient equals 11 mm Hg, estimated  aortic valve areaequals 1.2 sqcm (by continuity equation),  aortic valve velocity time integral equals 37 cm,  consistent with moderate aortic stenosis. Mild aortic  regurgitation.  3. Mild concentric left ventricular hypertrophy.  4. Normal left ventricular systolicfunction. No segmental  wall motion abnormalities.  5. Mild diastolic dysfunction (Stage I).  6. Normal right ventricular size and function.  7. Trace pericardial effusion.    Limited Transthoracic Echo (09.18.17 @ 01:02) >  Limited portable study to evaluate the pericardium.  1. Small pericardial effusion measuring up to approximately  0.8 cm inferior to the right heart. No pericardial  tamponade but unable to exclude findings consistent with  increased pericardial pressures based on limited images. No  right atrial or right ventricular collapse.  Consider interval limited follow-up echo to evaluate the  pericardium if clinically indicated.  *** Compared with echocardiogram of 9/17/2017, a small  inferior pericardial effusion is noted on today's study.

## 2017-09-18 NOTE — CONSULT NOTE ADULT - ATTENDING COMMENTS
Pt evaluated in AM round  Hypoxemia, large lt effusion, multiple small rt loculated effusion  Needs lt PIG tail catheter, RT CT to waterseal  Abx  O2  Discussed with Dr Cohen
patient seen and examined presented for clogged j tube that was remedied by surgery team. patient also reported shortness of breath for which he underwent a ct scan that demonstrated a large left pleural effusion and loculated right effusion - drain is still in place. patient  reports minimal murky output from his drain. patient Is on aggrenox - last taken yesterday - reports he did note shortness of breath - no oxygen use in rehab, no cough. plan for tap of his left side - with pulmonary/ admitted to surgical ICU _ under surgery team.   will leave his right drain in place until we can repeat a swallow study -no immediate plans for removal.
82M with nonfunctioning J-tube, s/p maryjane-logan surgery with left effusion and leukocytosis  -stable  -await cx results  -hold abx  -if fever or culture +ve or worsening WBC, start vancomycin and meropenem  -care per SICU    Juancarlos Donnelly  Attending Physician   Division of Infectious Disease  Pager #[phone]  After 5pm/weekend #[phone]    Please call the ID service [phone] with questions or concerns over the weekend.
Thank you for the consultation and allowing my team and I to participate in the care of Mr. De Luna. My team will follow. Case discussed with SICU team.    Moni Avalos M.D, F.A.C.C  [facility]   [phone]
d/w Dr. Gibson  pigtail vs. thoracentesis on left side  d/w Robbie- will take care of once he gets to SICU  ? whether rt opacity is new loculated effusion, can rt chest tube be pulled back?

## 2017-09-18 NOTE — PROGRESS NOTE ADULT - ASSESSMENT
ASSESSMENT: 82yMale with 2y M, s/p robot assisted [place]-Trent, possible persistent anastomotic leak, possible infected pleural effusion(s). Jejunostomy feeding tube unclogged by general surgery.     PLAN:   Neurologic:  pain control    Respiratory:  Maintain on NC, Keep sat >90%  Duoneb's/Albuterol PRN  AM chest xray    Cardiovascular:  Continue BP meds with hold parameters   Vital Signs Q-4  FU official ECHO read  Maintain Hep drip with PTT goal of 60-90  FU cards recommendations going forward for NSTEMI  Continue ASA  Restart Plavix give through J tube  FU Repeat Trops in AM    Gastrointestinal/Nutrition:  Keep NPO with Tube feeds    Genitourinary/Renal:  Strict I's and O's   D5 1/2NS @50/hr    Hematologic:  Lovenox for DVT ppx     Infectious Disease:  FU BCx's, UA  antibiotics - cefepime, vanc, flagyl  Trend Leukocytosis       Endocrine:  Continue Solumedrol for RA    Disposition: SICU

## 2017-09-18 NOTE — PROGRESS NOTE ADULT - SUBJECTIVE AND OBJECTIVE BOX
HISTORY  MEJIA LATIF is 82y M, s/p esophagogastrectomy (partial) with intra-thoracic esophagogastric anastomosis (Boston-Trent procedure) on 8/14/17 - post-op complicated by anastomotic leak, discharged to [facility] on 8/27 with R chest drain in place. He was admitted from rehab overnight with clogged jejunostomy feeding tube. He was taking medications by mouth. Patient reports ongoing SOB, states that he has not required oxygen while at rehab. Denies fevers/chills. Per patient, drainage from R chest has been minimal, murky brown fluid. The R CT drain's bulb has been difficult to keep to self-suction, as it fills with air quickly. Patient reports passage of flatus and stool. L pigtail catheter placed to drain pleural effusion.  Of note, patient is on prednisone 5 mg daily for rheumatoid arthritis    24 HOUR EVENTS:   - Patient had chest pain around 2200 and was Hypotensive to 90's systolic- Received 500 mL bolus of LR  - Cardiac Enzymes showed Trop of O.4>0.36 on repeat, EKG was done and  Dr. Avalos from cardiology was consulted and diagnosed NSTEMI  - Heparin GTT was started  - Bedside echo was done and showed small amount of pericardial fluid- Formal echo was performed at bedside by Card Fellow  - Placed R Radial A line  - Blood Cx's and UA were sent for septic workup   - Sublingual nitro was used x 3 and alleviated the pain, Nitro drip was started for continuous relief         SUBJECTIVE/ROS:  [x ] A ten-point review of systems was otherwise negative except as noted.  [ ] Due to altered mental status/intubation, subjective information were not able to be obtained from the patient. History was obtained, to the extent possible, from review of the chart and collateral sources of information.      NEURO  RASS:     GCS:     [facility] ICU:  Exam: AAO x3, moderate distress   Meds: fosphenytoin IVPB 200 milliGRAM(s) PE IV Intermittent every 12 hours  aspirin Suppository 300 milliGRAM(s) Rectal every 24 hours    [x] Adequacy of sedation and pain control has been assessed and adjusted      RESPIRATORY  RR: 23 (09-18-17 @ 04:15) (22 - 36)  SpO2: 92% (09-18-17 @ 04:15) (92% - 100%)  Exam: unlabored, clear to auscultation bilaterally  Mechanical Ventilation:   ABG - ( 18 Sep 2017 03:19 )  pH: 7.41  /  pCO2: 36    /  pO2: 122   / HCO3: 22    / Base Excess: -1.8  /  SaO2: 99      Lactate: x        Meds: ALBUTerol/ipratropium for Nebulization 3 milliLiter(s) Nebulizer every 6 hours        CARDIOVASCULAR  HR: 102 (09-18-17 @ 04:15) (88 - 109)  BP: 112/60 (09-18-17 @ 01:30) (90/56 - 128/65)  BP(mean): 77 (09-18-17 @ 01:30) (67 - 91)  ABP: 118/61 (09-18-17 @ 04:15) (84/46 - 125/64)  ABP(mean): 81 (09-18-17 @ 04:15) (58 - 87)  VBG - ( 17 Sep 2017 23:15 )  pH: 7.37  /  pCO2: 44    /  pO2: 27    / HCO3: 24    / Base Excess: -0.2  /  SaO2: 38     Lactate: 1.5      Exam: S1, S2, RRR  Cardiac Rhythm: NSR  Perfusion     [x ]Adequate   [ ]Inadequate  Mentation   [x ]Normal       [ ]Reduced  Extremities  [x ]Warm         [ ]Cool  Volume Status [ ]Hypervolemic [x ]Euvolemic [ ]Hypovolemic  Meds: furosemide   Injectable 10 milliGRAM(s) IV Push daily  phenylephrine    Infusion 0.5 MICROgram(s)/kG/Min IV Continuous <Continuous>  nitroglycerin  Infusion 5 MICROgram(s)/Min IV Continuous <Continuous>    GI/NUTRITION  Exam: Soft, Tender in epigastric/sub xyphoid region, Non distended   Diet: NPO with TF  Meds:     GENITOURINARY  I&O's Detail    09-16 @ 07:01  -  09-17 @ 07:00  --------------------------------------------------------  IN:    dextrose 5% + sodium chloride 0.9%: 800 mL    IV PiggyBack: 550 mL    lactated ringers.: 150 mL    Solution: 50 mL    Vital HN: 60 mL  Total IN: 1610 mL    OUT:    Chest Tube: 300 mL    Voided: 1415 mL  Total OUT: 1715 mL    Total NET: -105 mL      09-17 @ 07:01  -  09-18 @ 04:51  --------------------------------------------------------  IN:    dextrose 5% + sodium chloride 0.45%.: 300 mL    heparin Infusion: 20 mL    IV PiggyBack: 500 mL    Lactated Ringers IV Bolus: 500 mL    phenylephrine   Infusion: 87.7 mL    Solution: 50 mL    Vital HN: 970 mL  Total IN: 2427.7 mL    OUT:    Chest Tube: 120 mL    Voided: 475 mL  Total OUT: 595 mL    Total NET: 1832.7 mL          09-18    138  |  105  |  27<H>  ----------------------------<  204<H>  3.6   |  20<L>  |  0.77    Ca    8.6      18 Sep 2017 03:29  Phos  2.9     09-18  Mg     1.9     09-18    TPro  6.2  /  Alb  2.5<L>  /  TBili  0.6  /  DBili  0.4<H>  /  AST  34  /  ALT  27  /  AlkPhos  248<H>  09-18    [ ] López catheter, indication: N/A  Meds: cyanocobalamin Injectable 500 MICROGram(s) SubCutaneous daily  pyridoxine Injectable 100 milliGRAM(s) IV Push daily  folic acid Injectable 1 milliGRAM(s) IV Push daily  dextrose 5% + sodium chloride 0.45%. 1000 milliLiter(s) IV Continuous <Continuous>        HEMATOLOGIC  Meds: heparin  Infusion 1000 Unit(s)/Hr IV Continuous <Continuous>    [x] VTE Prophylaxis                        8.0    17.6  )-----------( 392      ( 18 Sep 2017 03:29 )             24.1     PT/INR - ( 18 Sep 2017 03:29 )   PT: 18.2 sec;   INR: 1.67 ratio         PTT - ( 18 Sep 2017 03:29 )  PTT:30.6 sec  Transfusion     [ ] PRBC   [ ] Platelets   [ ] FFP   [ ] Cryoprecipitate      INFECTIOUS DISEASES  T(C): 36.8 (09-18-17 @ 03:00), Max: 36.9 (09-17-17 @ 19:00)  WBC Count: 17.6 K/uL (09-18 @ 03:29)  WBC Count: 14.1 K/uL (09-17 @ 23:31)  WBC Count: 14.5 K/uL (09-17 @ 22:53)  WBC Count: 10.5 K/uL (09-17 @ 08:09)  WBC Count: 6.5 K/uL (09-17 @ 05:05)    Recent Cultures: BCX (9/18) ordered, UA ordered  BCx's (9/16)- NGTD    Meds: influenza   Vaccine 0.5 milliLiter(s) IntraMuscular once  cefepime  IVPB 1000 milliGRAM(s) IV Intermittent every 12 hours  metroNIDAZOLE  IVPB 500 milliGRAM(s) IV Intermittent every 8 hours  vancomycin  IVPB 750 milliGRAM(s) IV Intermittent every 12 hours        ENDOCRINE  Capillary Blood Glucose    Meds: methylPREDNISolone sodium succinate Injectable 4 milliGRAM(s) IV Push every 24 hours        ACCESS DEVICES:  [x ] Peripheral IV  [ ] Central Venous Line	[ ] R	[ ] L	[ ] IJ	[ ] Fem	[ ] SC	Placed:   [x ] Arterial Line		[x ] R	[ ] L	[ ] Fem	[ x] Rad	[ ] Ax	Placed:   [ ] PICC:					[ ] Mediport  [x ] Urinary Catheter, Date Placed:   [x ] Necessity of urinary, arterial, and venous catheters discussed    OTHER MEDICATIONS:      CODE STATUS:     IMAGING:  ECHO- FU official Read

## 2017-09-18 NOTE — PROGRESS NOTE ADULT - ASSESSMENT
· Assessment		  82y M, s/p Vance-Trent, possible anastomotic leak, possible infected pleural effusion(s). Jejunostomy feeding tube unclogged by general surgery.     -  will leave  right drain in place until we can repeat a swallow study, esophagram  - Left chest tube mgnt per general surgery-140cc/24 hrs

## 2017-09-18 NOTE — PROGRESS NOTE ADULT - PROBLEM SELECTOR PLAN 4
wbc continues to rise  all cx neg, Left pleural effusion = transudate  await esophagram to eval for leak  Consider sending R CT for pleural fluid analysis(if draining/working) -reported that is drained dark fluid prior to admit to hosp.  Thoracic surgery for R CT  On broad spectrum abx; cefepime, flagyl, vanco  all cx neg thus far

## 2017-09-18 NOTE — PROGRESS NOTE ADULT - ASSESSMENT
82 M presents with non-functioning J tube and SOB. 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 Dyer and Dr. Christian Weinstein on 8/14/17. After surgery, patient was found to have an anastomotic leak and was treated conservatively with NPO, antibiotics, chest tube drainage, and J tube feeding. Patient was eventually discharged to rehab. On day of evaluation, patient's J tube was unable to be used, and was sent to the ED for evaluation. Now w/ left pleural effusion and leukocytosis on admission.  S/p thoracentesis.

## 2017-09-18 NOTE — PROCEDURE NOTE - PROCEDURE
<<-----Click on this checkbox to enter Procedure Arterial line placement  09/18/2017    Active  WROYSTER

## 2017-09-18 NOTE — PROCEDURE NOTE - NSINFORMCONSENT_GEN_A_CORE
Benefits, risks, and possible complications of procedure explained to patient/caregiver who verbalized understanding and gave verbal consent.
This was an emergent procedure.
Benefits, risks, and possible complications of procedure explained to patient/caregiver who verbalized understanding and gave written consent.

## 2017-09-18 NOTE — PROGRESS NOTE ADULT - SUBJECTIVE AND OBJECTIVE BOX
VITAL SIGNS      Vital Signs Last 24 Hrs  T(C): 36.8 (17 @ 11:00), Max: 36.9 (17 @ 19:00)  T(F): 98.2 (17 @ 11:00), Max: 98.5 (17 @ 19:00)  HR: 100 (17 @ 12:45) (96 - 109)  BP: 111/66 (17 @ 12:30) (90/56 - 126/63)  RR: 26 (17 @ 12:45) (21 - 36)  SpO2: 100% (17 @ 12:45) (87% - 100%)              Daily Weight in k.6 (18 Sep 2017 10:52)                    Drains:               L Pleural  [x  ]  Drainage: 140 cc  24 hrs                                PHYSICAL EXAM    Neurology: alert and oriented x 3, moves all extremities with no defecits  CV :  RRR  Lungs:     b/l diminished throughout  Abdomen: soft, nontender, nondistended, positive bowel sounds  Extremities:     no edema  no calf tenderness

## 2017-09-18 NOTE — PROCEDURE NOTE - NSPOSTCAREGUIDE_GEN_A_CORE
Care for catheter as per unit/ICU protocols

## 2017-09-18 NOTE — PROGRESS NOTE ADULT - SUBJECTIVE AND OBJECTIVE BOX
Follow-up Pulm Progress Note    hypoxia on high flow 60% 40 lpm, s/p Left CT, has R CT to suction bulb    Medications:  MEDICATIONS  (STANDING):  ALBUTerol/ipratropium for Nebulization 3 milliLiter(s) Nebulizer every 6 hours  influenza   Vaccine 0.5 milliLiter(s) IntraMuscular once  cefepime  IVPB 1000 milliGRAM(s) IV Intermittent every 12 hours  metroNIDAZOLE  IVPB 500 milliGRAM(s) IV Intermittent every 8 hours  vancomycin  IVPB 750 milliGRAM(s) IV Intermittent every 12 hours  cyanocobalamin Injectable 500 MICROGram(s) SubCutaneous daily  pyridoxine Injectable 100 milliGRAM(s) IV Push daily  folic acid Injectable 1 milliGRAM(s) IV Push daily  fosphenytoin IVPB 200 milliGRAM(s) PE IV Intermittent every 12 hours  methylPREDNISolone sodium succinate Injectable 4 milliGRAM(s) IV Push every 24 hours  phenylephrine    Infusion 0.5 MICROgram(s)/kG/Min (11.963 mL/Hr) IV Continuous <Continuous>  heparin  Infusion 1000 Unit(s)/Hr (10 mL/Hr) IV Continuous <Continuous>  aspirin  chewable 81 milliGRAM(s) Oral daily    MEDICATIONS  (PRN):          Vital Signs Last 24 Hrs  T(C): 36.8 (18 Sep 2017 03:00), Max: 36.9 (17 Sep 2017 19:00)  T(F): 98.2 (18 Sep 2017 03:00), Max: 98.5 (17 Sep 2017 19:00)  HR: 102 (18 Sep 2017 09:00) (92 - 109)  BP: 108/69 (18 Sep 2017 08:30) (90/56 - 126/63)  BP(mean): 83 (18 Sep 2017 08:30) (67 - 89)  RR: 30 (18 Sep 2017 09:00) (21 - 36)  SpO2: 87% (18 Sep 2017 09:00) (87% - 100%)    ABG - ( 18 Sep 2017 06:44 )  pH: 7.39  /  pCO2: 35    /  pO2: 162   / HCO3: 21    / Base Excess: -2.9  /  SaO2: 100               VBG pH 7.37  @ 23:15    VBG pCO2 44  @ 23:15    VBG O2 sat 38  @ 23:15    VBG lactate 1.5  @ 23:15       @ 07:01  -   @ 07:00  --------------------------------------------------------  IN: 3150.8 mL / OUT: 735 mL / NET: 2415.8 mL          LABS:                        8.4    18.5  )-----------( 418      ( 18 Sep 2017 06:48 )             25.0         138  |  106  |  28<H>  ----------------------------<  262<H>  3.9   |  17<L>  |  0.75    Ca    8.4      18 Sep 2017 06:48  Phos  3.1       Mg     2.0         TPro  6.2  /  Alb  2.5<L>  /  TBili  0.6  /  DBili  0.4<H>  /  AST  34  /  ALT  27  /  AlkPhos  248<H>        CARDIAC MARKERS ( 18 Sep 2017 06:48 )  x     / 0.30 ng/mL / 28 U/L / x     / 4.0 ng/mL  CARDIAC MARKERS ( 18 Sep 2017 00:56 )  x     / 0.36 ng/mL / 29 U/L / x     / 4.2 ng/mL  CARDIAC MARKERS ( 17 Sep 2017 22:53 )  x     / 0.40 ng/mL / 38 U/L / x     / 4.1 ng/mL      CAPILLARY BLOOD GLUCOSE        PT/INR - ( 18 Sep 2017 03:29 )   PT: 18.2 sec;   INR: 1.67 ratio         PTT - ( 18 Sep 2017 03:29 )  PTT:30.6 sec  Urinalysis Basic - ( 18 Sep 2017 06:06 )    Color: Yellow / Appearance: x / S.021 / pH: x  Gluc: x / Ketone: Negative  / Bili: Negative / Urobili: 1   Blood: x / Protein: 30 mg/dL / Nitrite: Negative   Leuk Esterase: Trace / RBC: x / WBC 2-5 /HPF   Sq Epi: x / Non Sq Epi: x / Bacteria: Few /HPF      Procalcitonin, Serum: 0.24 ng/mL (18 Sep 2017 03:30)  Procalcitonin, Serum: 0.27 ng/mL (16 Sep 2017 04:56)    Serum Pro-Brain Natriuretic Peptide: 3503 pg/mL (18 Sep 2017 06:48)  Serum Pro-Brain Natriuretic Peptide: 1515 pg/mL (15 Sep 2017 14:54)            Fluid characteristics   TRANSUDATE PLEURAL EFFUSION  --  @ 08:09  pH >8    tprot 1.9          CULTURES: Culture - Body Fluid with Gram Stain (17 @ 12:42)    Gram Stain:   No organisms seen per oil power field  polymorphonuclear leukocytes seen     per low power field  by cytocentrifuge    Specimen Source: Pleural Fl Other, Pleural Fluid    Culture Results:   No growth to date.    Culture - Blood (17 @ 15:30)    Specimen Source: .Blood Blood-Venous    Culture Results:   No growth to date.    Culture - Blood (17 @ 15:30)    Specimen Source: .Blood Blood-Venous    Culture Results:   No growth to date.    Culture - Body Fluid with Gram Stain (17 @ 00:58)    Gram Stain:   No polymorphonuclear cells seen  No organisms seen  by cytocentrifuge    Specimen Source: .Body Fluid Pleural Fluid    Culture Results:   No growth    Culture - Blood (09.15.17 @ 07:47)    Specimen Source: .Blood Blood-Venous    Culture Results:   No growth to date.    Culture - Blood (09.15.17 @ 07:46)    Gram Stain:   Growth in anaerobic bottle: Gram Positive Cocci in Clusters  Growth in aerobic bottle:  Gram Positive Cocci in Clusters  Gram Positive Rods    -  Coagulase negative Staphylococcus: Detec                      Physical Examination:  PULM: decrease bs bilaterally, no significant sputum production  CVS: S1, S2 heard    RADIOLOGY REVIEWED  CXR: < from: Xray Chest 1 View AP -PORTABLE-Routine (17 @ 08:26) >  Impression:    The heart is slightly enlarged. Bilateral pleural effusion which remain   unchanged when compared to previous study done 2017. Small right   pleural effusion. Life supporting devices are all in good position and   unchanged. No pneumothorax.          CT chest:< from: CT Angio Chest w/ IV Cont (09.15.17 @ 00:51) >  COMPARISON: CT chest 2017, 2017, barium swallow 2017    PROCEDURE:   CT Angiography of the Chest.  90 ml of Omnipaque 350 was injected intravenously. 10 ml were discarded.  Sagittal and coronal reformats were performed as well as MIPS.    FINDINGS:    CHEST:     LUNGS AND LARGE AIRWAYS: Patent central airways. Bilateral passive   atelectasis with partial collapse of left lower lobe. Emphysema.  PLEURA: Moderate/large left pleural effusion, increased in size since   2017. Unchanged small/moderate, loculated pleural effusion.   Right-sided chest tube is in place, unchanged in position  VESSELS: No evidence of pulmonary embolism.  Mild atherosclerotic   calcification of the thoracic aorta and arch vessels.  Bovine arch.  No   gross thoracic dissection.  HEART: Cardiomegaly.  New small pericardial effusion.  Heavy   atherosclerotic calcification of the coronary arteries.  Mild   calcification aortic valve leaflets.  Mild dystrophic calcification the   mitral annulus.  MEDIASTINUM AND LAURA: No lymphadenopathy. Patient is status post   esophagectomy with gastric pull-through. Fluid and debris is present   within the intrathoracic stomach.  CHEST WALL AND LOWER NECK: Mild subcutaneous edema..  VISUALIZED UPPER ABDOMEN: Nodular thickening of the adrenal glands.    Upper pole renal cysts measure up to approximately 7.5 cm.  Right upper   pole renal cysts measuring approximately 2.5 cm a jejunostomy tube is   partially visualized.  BONES: Multilevel thoracic spondylosis with a moderate, chronic   compression deformity of L1.    IMPRESSION:     No pulmonary embolism.    Status post distal esophagectomy with gastric pull-through.   Evaluation   for persistent leak is limited on this examination without contrast.    Moderate/large left pleural effusion, increased in size since 2017.   Stable small/moderate loculated right pleural effusion with a right-sided   chest tube in place.    Small pericardial effusion is new since 2017.    Mild subcutaneous edema.        TTE:< from: Transthoracic Echocardiogram (17 @ 16:02) >  ------------------------------------------------------------------------  Conclusions:  1. Mitral annular calcification, otherwise normal mitral  valve. Minimal mitral regurgitation.  2. Aortic valve not well visualized; appears to be a  calcified trileaflet valve with decreased opening. Peak  transaortic valve gradient equals 18 mm Hg, mean  transaortic valve gradient equals 11 mm Hg, estimated  aortic valve areaequals 1.2 sqcm (by continuity equation),  aortic valve velocity time integral equals 37 cm,  consistent with moderate aortic stenosis. Mild aortic  regurgitation.  3. Mild concentric left ventricular hypertrophy.  4. Normal left ventricular systolicfunction. No segmental  wall motion abnormalities.  5. Mild diastolic dysfunction (Stage I).  6. Normal right ventricular size and function.  7. Trace pericardial effusion.  ---------------------------------------------- Follow-up Pulm Progress Note    hypoxia on high flow 60% 40 lpm, s/p Left CT, has R CT to suction bulb    Medications:  MEDICATIONS  (STANDING):  ALBUTerol/ipratropium for Nebulization 3 milliLiter(s) Nebulizer every 6 hours  influenza   Vaccine 0.5 milliLiter(s) IntraMuscular once  cefepime  IVPB 1000 milliGRAM(s) IV Intermittent every 12 hours  metroNIDAZOLE  IVPB 500 milliGRAM(s) IV Intermittent every 8 hours  vancomycin  IVPB 750 milliGRAM(s) IV Intermittent every 12 hours  cyanocobalamin Injectable 500 MICROGram(s) SubCutaneous daily  pyridoxine Injectable 100 milliGRAM(s) IV Push daily  folic acid Injectable 1 milliGRAM(s) IV Push daily  fosphenytoin IVPB 200 milliGRAM(s) PE IV Intermittent every 12 hours  methylPREDNISolone sodium succinate Injectable 4 milliGRAM(s) IV Push every 24 hours  phenylephrine    Infusion 0.5 MICROgram(s)/kG/Min (11.963 mL/Hr) IV Continuous <Continuous>  heparin  Infusion 1000 Unit(s)/Hr (10 mL/Hr) IV Continuous <Continuous>  aspirin  chewable 81 milliGRAM(s) Oral daily    MEDICATIONS  (PRN):    Vital Signs Last 24 Hrs  T(C): 36.8 (18 Sep 2017 03:00), Max: 36.9 (17 Sep 2017 19:00)  T(F): 98.2 (18 Sep 2017 03:00), Max: 98.5 (17 Sep 2017 19:00)  HR: 102 (18 Sep 2017 09:00) (92 - 109)  BP: 108/69 (18 Sep 2017 08:30) (90/56 - 126/63)  BP(mean): 83 (18 Sep 2017 08:30) (67 - 89)  RR: 30 (18 Sep 2017 09:00) (21 - 36)  SpO2: 87% (18 Sep 2017 09:00) (87% - 100%)    ABG - ( 18 Sep 2017 06:44 )  pH: 7.39  /  pCO2: 35    /  pO2: 162   / HCO3: 21    / Base Excess: -2.9  /  SaO2: 100       VBG pH 7.37  @ 23:15    VBG pCO2 44  @ 23:15    VBG O2 sat 38  @ 23:15    VBG lactate 1.5  @ 23:15       @ 07:01  -   @ 07:00  --------------------------------------------------------  IN: 3150.8 mL / OUT: 735 mL / NET: 2415.8 mL          LABS:                        8.4    18.5  )-----------( 418      ( 18 Sep 2017 06:48 )             25.0         138  |  106  |  28<H>  ----------------------------<  262<H>  3.9   |  17<L>  |  0.75    Ca    8.4      18 Sep 2017 06:48  Phos  3.1       Mg     2.0         TPro  6.2  /  Alb  2.5<L>  /  TBili  0.6  /  DBili  0.4<H>  /  AST  34  /  ALT  27  /  AlkPhos  248<H>        CARDIAC MARKERS ( 18 Sep 2017 06:48 )  x     / 0.30 ng/mL / 28 U/L / x     / 4.0 ng/mL  CARDIAC MARKERS ( 18 Sep 2017 00:56 )  x     / 0.36 ng/mL / 29 U/L / x     / 4.2 ng/mL  CARDIAC MARKERS ( 17 Sep 2017 22:53 )  x     / 0.40 ng/mL / 38 U/L / x     / 4.1 ng/mL      CAPILLARY BLOOD GLUCOSE        PT/INR - ( 18 Sep 2017 03:29 )   PT: 18.2 sec;   INR: 1.67 ratio         PTT - ( 18 Sep 2017 03:29 )  PTT:30.6 sec  Urinalysis Basic - ( 18 Sep 2017 06:06 )    Color: Yellow / Appearance: x / S.021 / pH: x  Gluc: x / Ketone: Negative  / Bili: Negative / Urobili: 1   Blood: x / Protein: 30 mg/dL / Nitrite: Negative   Leuk Esterase: Trace / RBC: x / WBC 2-5 /HPF   Sq Epi: x / Non Sq Epi: x / Bacteria: Few /HPF      Procalcitonin, Serum: 0.24 ng/mL (18 Sep 2017 03:30)  Procalcitonin, Serum: 0.27 ng/mL (16 Sep 2017 04:56)    Serum Pro-Brain Natriuretic Peptide: 3503 pg/mL (18 Sep 2017 06:48)  Serum Pro-Brain Natriuretic Peptide: 1515 pg/mL (15 Sep 2017 14:54)      Fluid characteristics   TRANSUDATE PLEURAL EFFUSION  --  @ 08:09  pH >8    tprot 1.9    CULTURES: Culture - Body Fluid with Gram Stain (17 @ 12:42)    Gram Stain:   No organisms seen per oil power field  polymorphonuclear leukocytes seen     per low power field  by cytocentrifuge    Specimen Source: Pleural Fl Other, Pleural Fluid    Culture Results:   No growth to date.    Culture - Blood (17 @ 15:30)    Specimen Source: .Blood Blood-Venous    Culture Results:   No growth to date.    Culture - Blood (17 @ 15:30)    Specimen Source: .Blood Blood-Venous    Culture Results:   No growth to date.    Culture - Body Fluid with Gram Stain (17 @ 00:58)    Gram Stain:   No polymorphonuclear cells seen  No organisms seen  by cytocentrifuge    Specimen Source: .Body Fluid Pleural Fluid    Culture Results:   No growth    Culture - Blood (09.15.17 @ 07:47)    Specimen Source: .Blood Blood-Venous    Culture Results:   No growth to date.    Culture - Blood (09.15.17 @ 07:46)    Gram Stain:   Growth in anaerobic bottle: Gram Positive Cocci in Clusters  Growth in aerobic bottle:  Gram Positive Cocci in Clusters  Gram Positive Rods    -  Coagulase negative Staphylococcus: Detec    Physical Examination:  PULM: decrease bs bilaterally, no significant sputum production  CVS: S1, S2 heard    RADIOLOGY REVIEWED  CXR: < from: Xray Chest 1 View AP -PORTABLE-Routine (17 @ 08:26) >  Impression:    The heart is slightly enlarged. Bilateral pleural effusion which remain   unchanged when compared to previous study done 2017. Small right   pleural effusion. Life supporting devices are all in good position and   unchanged. No pneumothorax.          CT chest:< from: CT Angio Chest w/ IV Cont (09.15.17 @ 00:51) >  COMPARISON: CT chest 2017, 2017, barium swallow 2017    PROCEDURE:   CT Angiography of the Chest.  90 ml of Omnipaque 350 was injected intravenously. 10 ml were discarded.  Sagittal and coronal reformats were performed as well as MIPS.    FINDINGS:    CHEST:     LUNGS AND LARGE AIRWAYS: Patent central airways. Bilateral passive   atelectasis with partial collapse of left lower lobe. Emphysema.  PLEURA: Moderate/large left pleural effusion, increased in size since   2017. Unchanged small/moderate, loculated pleural effusion.   Right-sided chest tube is in place, unchanged in position  VESSELS: No evidence of pulmonary embolism.  Mild atherosclerotic   calcification of the thoracic aorta and arch vessels.  Bovine arch.  No   gross thoracic dissection.  HEART: Cardiomegaly.  New small pericardial effusion.  Heavy   atherosclerotic calcification of the coronary arteries.  Mild   calcification aortic valve leaflets.  Mild dystrophic calcification the   mitral annulus.  MEDIASTINUM AND LAURA: No lymphadenopathy. Patient is status post   esophagectomy with gastric pull-through. Fluid and debris is present   within the intrathoracic stomach.  CHEST WALL AND LOWER NECK: Mild subcutaneous edema..  VISUALIZED UPPER ABDOMEN: Nodular thickening of the adrenal glands.    Upper pole renal cysts measure up to approximately 7.5 cm.  Right upper   pole renal cysts measuring approximately 2.5 cm a jejunostomy tube is   partially visualized.  BONES: Multilevel thoracic spondylosis with a moderate, chronic   compression deformity of L1.    IMPRESSION:     No pulmonary embolism.    Status post distal esophagectomy with gastric pull-through.   Evaluation   for persistent leak is limited on this examination without contrast.    Moderate/large left pleural effusion, increased in size since 2017.   Stable small/moderate loculated right pleural effusion with a right-sided   chest tube in place.    Small pericardial effusion is new since 2017.    Mild subcutaneous edema.        TTE:< from: Transthoracic Echocardiogram (17 @ 16:02) >  ------------------------------------------------------------------------  Conclusions:  1. Mitral annular calcification, otherwise normal mitral  valve. Minimal mitral regurgitation.  2. Aortic valve not well visualized; appears to be a  calcified trileaflet valve with decreased opening. Peak  transaortic valve gradient equals 18 mm Hg, mean  transaortic valve gradient equals 11 mm Hg, estimated  aortic valve areaequals 1.2 sqcm (by continuity equation),  aortic valve velocity time integral equals 37 cm,  consistent with moderate aortic stenosis. Mild aortic  regurgitation.  3. Mild concentric left ventricular hypertrophy.  4. Normal left ventricular systolicfunction. No segmental  wall motion abnormalities.  5. Mild diastolic dysfunction (Stage I).  6. Normal right ventricular size and function.  7. Trace pericardial effusion.  ----------------------------------------------

## 2017-09-18 NOTE — PROGRESS NOTE ADULT - PROBLEM SELECTOR PLAN 1
lt side pleural drainage lt side pleural drainage    keep rt tube in place until after esophagram study    may need repeat Chest CT this week

## 2017-09-19 DIAGNOSIS — J44.9 CHRONIC OBSTRUCTIVE PULMONARY DISEASE, UNSPECIFIED: ICD-10-CM

## 2017-09-19 LAB
AMYLASE FLD-CCNC: 32 U/L — SIGNIFICANT CHANGE UP
ANION GAP SERPL CALC-SCNC: 13 MMOL/L — SIGNIFICANT CHANGE UP (ref 5–17)
APTT BLD: 62.9 SEC — HIGH (ref 27.5–37.4)
BUN SERPL-MCNC: 29 MG/DL — HIGH (ref 7–23)
CALCIUM SERPL-MCNC: 8.7 MG/DL — SIGNIFICANT CHANGE UP (ref 8.4–10.5)
CHLORIDE SERPL-SCNC: 105 MMOL/L — SIGNIFICANT CHANGE UP (ref 96–108)
CK MB BLD-MCNC: 6.3 % — HIGH (ref 0–3.5)
CK MB BLD-MCNC: 7 % — HIGH (ref 0–3.5)
CK MB BLD-MCNC: 8.1 % — HIGH (ref 0–3.5)
CK MB CFR SERPL CALC: 7.3 NG/ML — HIGH (ref 0–6.7)
CK MB CFR SERPL CALC: 7.7 NG/ML — HIGH (ref 0–6.7)
CK MB CFR SERPL CALC: 8.1 NG/ML — HIGH (ref 0–6.7)
CK SERPL-CCNC: 100 U/L — SIGNIFICANT CHANGE UP (ref 30–200)
CK SERPL-CCNC: 105 U/L — SIGNIFICANT CHANGE UP (ref 30–200)
CK SERPL-CCNC: 122 U/L — SIGNIFICANT CHANGE UP (ref 30–200)
CO2 SERPL-SCNC: 21 MMOL/L — LOW (ref 22–31)
CREAT SERPL-MCNC: 0.7 MG/DL — SIGNIFICANT CHANGE UP (ref 0.5–1.3)
GAS PNL BLDA: SIGNIFICANT CHANGE UP
GAS PNL BLDA: SIGNIFICANT CHANGE UP
GLUCOSE SERPL-MCNC: 107 MG/DL — HIGH (ref 70–99)
HCT VFR BLD CALC: 27.2 % — LOW (ref 39–50)
HGB BLD-MCNC: 8.9 G/DL — LOW (ref 13–17)
MAGNESIUM SERPL-MCNC: 2.3 MG/DL — SIGNIFICANT CHANGE UP (ref 1.6–2.6)
MCHC RBC-ENTMCNC: 31.2 PG — SIGNIFICANT CHANGE UP (ref 27–34)
MCHC RBC-ENTMCNC: 32.5 GM/DL — SIGNIFICANT CHANGE UP (ref 32–36)
MCV RBC AUTO: 96 FL — SIGNIFICANT CHANGE UP (ref 80–100)
PHENYTOIN FREE SERPL-MCNC: 4.9 UG/ML — LOW (ref 10–20)
PHOSPHATE SERPL-MCNC: 2.3 MG/DL — LOW (ref 2.5–4.5)
PLATELET # BLD AUTO: 312 K/UL — SIGNIFICANT CHANGE UP (ref 150–400)
POTASSIUM SERPL-MCNC: 3.6 MMOL/L — SIGNIFICANT CHANGE UP (ref 3.5–5.3)
POTASSIUM SERPL-SCNC: 3.6 MMOL/L — SIGNIFICANT CHANGE UP (ref 3.5–5.3)
RAPID RVP RESULT: SIGNIFICANT CHANGE UP
RBC # BLD: 2.84 M/UL — LOW (ref 4.2–5.8)
RBC # FLD: 13.7 % — SIGNIFICANT CHANGE UP (ref 10.3–14.5)
SODIUM SERPL-SCNC: 139 MMOL/L — SIGNIFICANT CHANGE UP (ref 135–145)
TROPONIN T SERPL-MCNC: 0.54 NG/ML — HIGH (ref 0–0.06)
TROPONIN T SERPL-MCNC: 0.54 NG/ML — HIGH (ref 0–0.06)
WBC # BLD: 17.2 K/UL — HIGH (ref 3.8–10.5)
WBC # FLD AUTO: 17.2 K/UL — HIGH (ref 3.8–10.5)

## 2017-09-19 PROCEDURE — 99232 SBSQ HOSP IP/OBS MODERATE 35: CPT

## 2017-09-19 PROCEDURE — 71010: CPT | Mod: 26

## 2017-09-19 PROCEDURE — 74220 X-RAY XM ESOPHAGUS 1CNTRST: CPT | Mod: 26

## 2017-09-19 RX ORDER — SODIUM,POTASSIUM PHOSPHATES 278-250MG
1 POWDER IN PACKET (EA) ORAL ONCE
Qty: 0 | Refills: 0 | Status: COMPLETED | OUTPATIENT
Start: 2017-09-19 | End: 2017-09-19

## 2017-09-19 RX ORDER — IPRATROPIUM/ALBUTEROL SULFATE 18-103MCG
3 AEROSOL WITH ADAPTER (GRAM) INHALATION EVERY 4 HOURS
Qty: 0 | Refills: 0 | Status: DISCONTINUED | OUTPATIENT
Start: 2017-09-19 | End: 2017-09-20

## 2017-09-19 RX ORDER — IPRATROPIUM/ALBUTEROL SULFATE 18-103MCG
3 AEROSOL WITH ADAPTER (GRAM) INHALATION ONCE
Qty: 0 | Refills: 0 | Status: COMPLETED | OUTPATIENT
Start: 2017-09-19 | End: 2017-09-19

## 2017-09-19 RX ORDER — ATORVASTATIN CALCIUM 80 MG/1
40 TABLET, FILM COATED ORAL AT BEDTIME
Qty: 0 | Refills: 0 | Status: DISCONTINUED | OUTPATIENT
Start: 2017-09-19 | End: 2017-09-19

## 2017-09-19 RX ORDER — TAMSULOSIN HYDROCHLORIDE 0.4 MG/1
0.4 CAPSULE ORAL AT BEDTIME
Qty: 0 | Refills: 0 | Status: DISCONTINUED | OUTPATIENT
Start: 2017-09-19 | End: 2017-09-19

## 2017-09-19 RX ORDER — DOXAZOSIN MESYLATE 4 MG
2 TABLET ORAL AT BEDTIME
Qty: 0 | Refills: 0 | Status: DISCONTINUED | OUTPATIENT
Start: 2017-09-19 | End: 2017-09-20

## 2017-09-19 RX ORDER — LIDOCAINE HCL 20 MG/ML
5 VIAL (ML) INJECTION ONCE
Qty: 0 | Refills: 0 | Status: DISCONTINUED | OUTPATIENT
Start: 2017-09-19 | End: 2017-09-20

## 2017-09-19 RX ADMIN — Medication 1 MILLIGRAM(S): at 12:44

## 2017-09-19 RX ADMIN — Medication 1 TABLET(S): at 06:29

## 2017-09-19 RX ADMIN — Medication 0.5 MILLIGRAM(S): at 20:24

## 2017-09-19 RX ADMIN — Medication 4 MILLIGRAM(S): at 17:16

## 2017-09-19 RX ADMIN — Medication 100 MILLIGRAM(S): at 12:43

## 2017-09-19 RX ADMIN — Medication 100 MILLIGRAM(S): at 05:19

## 2017-09-19 RX ADMIN — PREGABALIN 500 MICROGRAM(S): 225 CAPSULE ORAL at 12:44

## 2017-09-19 RX ADMIN — FOSPHENYTOIN 108 MILLIGRAM(S) PE: 50 INJECTION INTRAMUSCULAR; INTRAVENOUS at 18:39

## 2017-09-19 RX ADMIN — Medication 100 MILLIGRAM(S): at 14:37

## 2017-09-19 RX ADMIN — CLOPIDOGREL BISULFATE 75 MILLIGRAM(S): 75 TABLET, FILM COATED ORAL at 12:42

## 2017-09-19 RX ADMIN — Medication 2 MILLIGRAM(S): at 21:33

## 2017-09-19 RX ADMIN — Medication 100 MILLIGRAM(S): at 21:33

## 2017-09-19 RX ADMIN — Medication 3 MILLILITER(S): at 22:44

## 2017-09-19 RX ADMIN — HEPARIN SODIUM 11 UNIT(S)/HR: 5000 INJECTION INTRAVENOUS; SUBCUTANEOUS at 17:16

## 2017-09-19 RX ADMIN — Medication 81 MILLIGRAM(S): at 12:43

## 2017-09-19 RX ADMIN — PHENYLEPHRINE HYDROCHLORIDE 11.96 MICROGRAM(S)/KG/MIN: 10 INJECTION INTRAVENOUS at 17:16

## 2017-09-19 RX ADMIN — Medication 3 MILLILITER(S): at 17:36

## 2017-09-19 RX ADMIN — Medication 3 MILLILITER(S): at 05:22

## 2017-09-19 RX ADMIN — FOSPHENYTOIN 108 MILLIGRAM(S) PE: 50 INJECTION INTRAMUSCULAR; INTRAVENOUS at 05:19

## 2017-09-19 RX ADMIN — CEFEPIME 100 MILLIGRAM(S): 1 INJECTION, POWDER, FOR SOLUTION INTRAMUSCULAR; INTRAVENOUS at 17:16

## 2017-09-19 RX ADMIN — CEFEPIME 100 MILLIGRAM(S): 1 INJECTION, POWDER, FOR SOLUTION INTRAMUSCULAR; INTRAVENOUS at 05:22

## 2017-09-19 RX ADMIN — Medication 3 MILLILITER(S): at 08:10

## 2017-09-19 NOTE — PROGRESS NOTE ADULT - SUBJECTIVE AND OBJECTIVE BOX
Follow-up Pulm Progress Note        Medications:  MEDICATIONS  (STANDING):  ALBUTerol/ipratropium for Nebulization 3 milliLiter(s) Nebulizer every 6 hours  influenza   Vaccine 0.5 milliLiter(s) IntraMuscular once  cefepime  IVPB 1000 milliGRAM(s) IV Intermittent every 12 hours  metroNIDAZOLE  IVPB 500 milliGRAM(s) IV Intermittent every 8 hours  cyanocobalamin Injectable 500 MICROGram(s) SubCutaneous daily  pyridoxine Injectable 100 milliGRAM(s) IV Push daily  folic acid Injectable 1 milliGRAM(s) IV Push daily  fosphenytoin IVPB 200 milliGRAM(s) PE IV Intermittent every 12 hours  methylPREDNISolone sodium succinate Injectable 4 milliGRAM(s) IV Push every 24 hours  phenylephrine    Infusion 0.5 MICROgram(s)/kG/Min (11.963 mL/Hr) IV Continuous <Continuous>  clopidogrel Tablet 75 milliGRAM(s) Oral daily  aspirin  chewable 81 milliGRAM(s) Oral daily  heparin  Infusion 1000 Unit(s)/Hr (11 mL/Hr) IV Continuous <Continuous>  atorvastatin 40 milliGRAM(s) Oral at bedtime    MEDICATIONS  (PRN):  LORazepam   Injectable 0.5 milliGRAM(s) IV Push every 12 hours PRN Anxiety  sodium chloride 0.65% Nasal 1 Spray(s) Both Nostrils three times a day PRN Nasal Congestion          Vital Signs Last 24 Hrs  T(C): 36.9 (19 Sep 2017 03:00), Max: 37 (18 Sep 2017 23:15)  T(F): 98.4 (19 Sep 2017 03:00), Max: 98.6 (18 Sep 2017 23:15)  HR: 90 (19 Sep 2017 09:30) (89 - 109)  BP: 98/55 (19 Sep 2017 09:30) (92/54 - 116/66)  BP(mean): 69 (19 Sep 2017 09:30) (67 - 85)  RR: 27 (19 Sep 2017 09:30) (25 - 56)  SpO2: 98% (19 Sep 2017 09:30) (85% - 100%)    ABG - ( 19 Sep 2017 02:55 )  pH: 7.42  /  pCO2: 36    /  pO2: 83    / HCO3: 23    / Base Excess: -.5   /  SaO2: 97                VBG pH 7.37 09-17 @ 23:15    VBG pCO2 44 09-17 @ 23:15    VBG O2 sat 38 09-17 @ 23:15    VBG lactate 1.5  @ 23:15       @ 07:01  -   @ 07:00  --------------------------------------------------------  IN: 2004 mL / OUT: 880 mL / NET: 1124 mL          LABS:                        8.9    17.2  )-----------( 312      ( 19 Sep 2017 02:57 )             27.2         139  |  105  |  29<H>  ----------------------------<  107<H>  3.6   |  21<L>  |  0.70    Ca    8.7      19 Sep 2017 02:57  Phos  2.3       Mg     2.3         TPro  6.2  /  Alb  2.5<L>  /  TBili  0.6  /  DBili  0.4<H>  /  AST  34  /  ALT  27  /  AlkPhos  248<H>        CARDIAC MARKERS ( 19 Sep 2017 09:24 )  x     / 0.54 ng/mL / 122 U/L / x     / 7.7 ng/mL  CARDIAC MARKERS ( 19 Sep 2017 01:01 )  x     / 0.54 ng/mL / 100 U/L / x     / 8.1 ng/mL  CARDIAC MARKERS ( 18 Sep 2017 18:38 )  x     / 0.54 ng/mL / 39 U/L / x     / 5.6 ng/mL  CARDIAC MARKERS ( 18 Sep 2017 12:54 )  x     / 0.42 ng/mL / 39 U/L / x     / 5.5 ng/mL  CARDIAC MARKERS ( 18 Sep 2017 06:48 )  x     / 0.30 ng/mL / 28 U/L / x     / 4.0 ng/mL  CARDIAC MARKERS ( 18 Sep 2017 00:56 )  x     / 0.36 ng/mL / 29 U/L / x     / 4.2 ng/mL  CARDIAC MARKERS ( 17 Sep 2017 22:53 )  x     / 0.40 ng/mL / 38 U/L / x     / 4.1 ng/mL        PT/INR - ( 18 Sep 2017 03:29 )   PT: 18.2 sec;   INR: 1.67 ratio         PTT - ( 19 Sep 2017 01:01 )  PTT:62.9 sec  Urinalysis Basic - ( 18 Sep 2017 06:06 )    Color: Yellow / Appearance: x / S.021 / pH: x  Gluc: x / Ketone: Negative  / Bili: Negative / Urobili: 1   Blood: x / Protein: 30 mg/dL / Nitrite: Negative   Leuk Esterase: Trace / RBC: x / WBC 2-5 /HPF   Sq Epi: x / Non Sq Epi: x / Bacteria: Few /HPF      Procalcitonin, Serum: 0.24 ng/mL (18 Sep 2017 03:30)    Serum Pro-Brain Natriuretic Peptide: 3503 pg/mL (18 Sep 2017 06:48)            Fluid characteristics  --  @ 08:09  pH >8                      TRANSUDATE EFFUSION  tprot 1.9    Cell count  Appearance Cloudy  Fluid type --  BF lymph 9  color Yellow  eosinophil 2  PMN 55  Mesothelial --  Monocyte 34  Other body cells --  Fluid characteristics  -- 09-15 @ 20:21  pH --    tprot 3.3  --      CULTURES: Culture - Blood (17 @ 08:34)    Specimen Source: .Blood Blood    Culture Results:   No growth to date.    Culture - Body Fluid with Gram Stain (17 @ 12:42)    Gram Stain:   No organisms seen per oil power field  polymorphonuclear leukocytes seen     per low power field  by cytocentrifuge    Specimen Source: Pleural Fl Other, Pleural Fluid    Culture Results:   No growth to date.        .Physical Examination:  PULM:   CVS:     RADIOLOGY REVIEWED  CXR: < from: Xray Chest 1 View AP -PORTABLE-Routine (17 @ 07:08) >  Impression:    The heart isenlarged. Bilateral pleural effusion. All life supporting   devices are in good position and unchanged when compared to previous   study done 2017. No pneumothorax.    < end of copied text >      CT chest: < from: CT Angio Chest w/ IV Cont (09.15.17 @ 00:51) >  IMPRESSION:     No pulmonary embolism.    Status post distal esophagectomy with gastric pull-through.   Evaluation   for persistent leak is limited on this examination without contrast.    Moderate/large left pleural effusion, increased in size since 2017.   Stable small/moderate loculated right pleural effusion with a right-sided   chest tube in place.    Small pericardial effusion is new since 2017.    Mild subcutaneous edema.     >      TTE:< from: Transthoracic Echocardiogram (17 @ 10:15) >  CONCLUSIONS:  1. Mitral annular calcification and calcified mitral  leaflets with normal diastolic opening.  2. Calcified aortic valvewith decreased opening.  3. Low Normal left ventricular systolic function. EF 50%  4. The right ventricle is not well visualized; grossly  normal right ventricular systolic function.  5. Thickened  pericardium with small  0.9cm circumferential  pericardial effusion.There is evidence of raised intra  pericardial pressure with RA buckling however no  tamponade  seen.  ---------    < end of copied text >

## 2017-09-19 NOTE — PROGRESS NOTE ADULT - ASSESSMENT
ASSESSMENT: 82yMale with 2y M, s/p robot assisted [place], possible persistent anastomotic leak, possible infected pleural effusion(s). Jejunostomy feeding tube unclogged by general surgery.     PLAN:   Neurologic:  pain control    Respiratory:  Maintain on NC, Keep sat >90%  Duoneb's/Albuterol PRN      Cardiovascular:  Continue Ryan 0.5microg/kg/min titrate to MAP >65  Continue BP meds with hold parameters   Maintain Hep drip with PTT goal of 60-90  f/u cards recommendations going forward for NSTEMI  Continue ASA, Plavix  Start Lipitor 40mg qday  Vital Signs Q-4    Gastrointestinal/Nutrition:  Ice Chips w/ Tube Feeds     Genitourinary/Renal:  Strict I's and O's     Hematologic:  Heparin drip as above     Infectious Disease:  - d/c vanc  - f/u BCx's, UA  - antibiotics - cefepime, flagyl  - daily CBC     Endocrine:  Continue Solumedrol for RA    Disposition: SICU

## 2017-09-19 NOTE — PROVIDER CONTACT NOTE (OTHER) - ACTION/TREATMENT ORDERED:
Incentive spirometer encouraged. GORGE Tucker ultrasound at bedside. No lasix to be given.  Duoneb increased to Q 4hours.  Frequent deep breath and cough encouraged. ABG drawn and reviewed.

## 2017-09-19 NOTE — PROGRESS NOTE ADULT - SUBJECTIVE AND OBJECTIVE BOX
Follow-up Pulm Progress Note    No new respiratory events overnight.  Denies SOB/CP. 02 sat 95% 0n 3l nc, pt reporting cough. returned from esophagram, pt reports + cough with lt mucous and some barrium  R chest drain/bulb. L ct water seal, still on kamilah  Medications:  MEDICATIONS  (STANDING):  ALBUTerol/ipratropium for Nebulization 3 milliLiter(s) Nebulizer every 6 hours  influenza   Vaccine 0.5 milliLiter(s) IntraMuscular once  cefepime  IVPB 1000 milliGRAM(s) IV Intermittent every 12 hours  metroNIDAZOLE  IVPB 500 milliGRAM(s) IV Intermittent every 8 hours  cyanocobalamin Injectable 500 MICROGram(s) SubCutaneous daily  pyridoxine Injectable 100 milliGRAM(s) IV Push daily  folic acid Injectable 1 milliGRAM(s) IV Push daily  fosphenytoin IVPB 200 milliGRAM(s) PE IV Intermittent every 12 hours  methylPREDNISolone sodium succinate Injectable 4 milliGRAM(s) IV Push every 24 hours  phenylephrine    Infusion 0.5 MICROgram(s)/kG/Min (11.963 mL/Hr) IV Continuous <Continuous>  clopidogrel Tablet 75 milliGRAM(s) Oral daily  aspirin  chewable 81 milliGRAM(s) Oral daily  heparin  Infusion 1000 Unit(s)/Hr (11 mL/Hr) IV Continuous <Continuous>  atorvastatin 40 milliGRAM(s) Oral at bedtime    MEDICATIONS  (PRN):  LORazepam   Injectable 0.5 milliGRAM(s) IV Push every 12 hours PRN Anxiety  sodium chloride 0.65% Nasal 1 Spray(s) Both Nostrils three times a day PRN Nasal Congestion          Vital Signs Last 24 Hrs  T(C): 36.9 (19 Sep 2017 03:00), Max: 37 (18 Sep 2017 23:15)  T(F): 98.4 (19 Sep 2017 03:00), Max: 98.6 (18 Sep 2017 23:15)  HR: 90 (19 Sep 2017 09:30) (89 - 109)  BP: 98/55 (19 Sep 2017 09:30) (92/54 - 116/66)  BP(mean): 69 (19 Sep 2017 09:30) (67 - 85)  RR: 27 (19 Sep 2017 09:30) (25 - 56)  SpO2: 98% (19 Sep 2017 09:30) (85% - 100%)    ABG - ( 19 Sep 2017 02:55 )  pH: 7.42  /  pCO2: 36    /  pO2: 83    / HCO3: 23    / Base Excess: -.5   /  SaO2: 97                VBG pH 7.37  @ 23:15    VBG pCO2 44  @ 23:15    VBG O2 sat 38  @ 23:15    VBG lactate 1.5  @ 23:15       @ 07:01  -   @ 07:00  --------------------------------------------------------  IN: 2004 mL / OUT: 880 mL / NET: 1124 mL          LABS:                        8.9    17.2  )-----------( 312      ( 19 Sep 2017 02:57 )             27.2         139  |  105  |  29<H>  ----------------------------<  107<H>  3.6   |  21<L>  |  0.70    Ca    8.7      19 Sep 2017 02:57  Phos  2.3       Mg     2.3         TPro  6.2  /  Alb  2.5<L>  /  TBili  0.6  /  DBili  0.4<H>  /  AST  34  /  ALT  27  /  AlkPhos  248<H>        CARDIAC MARKERS ( 19 Sep 2017 09:24 )  x     / 0.54 ng/mL / 122 U/L / x     / 7.7 ng/mL  CARDIAC MARKERS ( 19 Sep 2017 01:01 )  x     / 0.54 ng/mL / 100 U/L / x     / 8.1 ng/mL  CARDIAC MARKERS ( 18 Sep 2017 18:38 )  x     / 0.54 ng/mL / 39 U/L / x     / 5.6 ng/mL  CARDIAC MARKERS ( 18 Sep 2017 12:54 )  x     / 0.42 ng/mL / 39 U/L / x     / 5.5 ng/mL  CARDIAC MARKERS ( 18 Sep 2017 06:48 )  x     / 0.30 ng/mL / 28 U/L / x     / 4.0 ng/mL  CARDIAC MARKERS ( 18 Sep 2017 00:56 )  x     / 0.36 ng/mL / 29 U/L / x     / 4.2 ng/mL  CARDIAC MARKERS ( 17 Sep 2017 22:53 )  x     / 0.40 ng/mL / 38 U/L / x     / 4.1 ng/mL      CAPILLARY BLOOD GLUCOSE        PT/INR - ( 18 Sep 2017 03:29 )   PT: 18.2 sec;   INR: 1.67 ratio         PTT - ( 19 Sep 2017 01:01 )  PTT:62.9 sec  Urinalysis Basic - ( 18 Sep 2017 06:06 )    Color: Yellow / Appearance: x / S.021 / pH: x  Gluc: x / Ketone: Negative  / Bili: Negative / Urobili: 1   Blood: x / Protein: 30 mg/dL / Nitrite: Negative   Leuk Esterase: Trace / RBC: x / WBC 2-5 /HPF   Sq Epi: x / Non Sq Epi: x / Bacteria: Few /HPF      Procalcitonin, Serum: 0.24 ng/mL (18 Sep 2017 03:30)    Serum Pro-Brain Natriuretic Peptide: 3503 pg/mL (18 Sep 2017 06:48)            Fluid characteristics  --  @ 08:09    exudate effuion  pH >8    tprot 1.9  seurum ldh 230  Cell count  Appearance Cloudy  Fluid type --  BF lymph 9  color Yellow  eosinophil 2  PMN 55  Mesothelial --  Monocyte 34  Other body cells --  Fluid characteristics  -- 09-15 @ 20:21      Culture - Blood (17 @ 08:34)    Specimen Source: .Blood Blood    Culture Results:   No growth to date.    Culture - Body Fluid with Gram Stain (17 @ 12:42)    Gram Stain:   No organisms seen per oil power field  polymorphonuclear leukocytes seen     per low power field  by cytocentrifuge    Specimen Source: Pleural Fl Other, Pleural Fluid    Culture Results:   No growth to date.    Culture - Blood (17 @ 15:30)    Specimen Source: .Blood Blood-Venous    Culture Results:   No growth to date.    Culture - Blood (17 @ 15:30)    Specimen Source: .Blood Blood-Venous    Culture Results:   No growth to date.    Culture - Blood (09.15.17 @ 07:46)    -  Coagulase negative Staphylococcus: Detec    Gram Stain:   Growth in anaerobic bottle: Gram Positive Cocci in Clusters  Growth in aerobic bottle:  Gram Positive Cocci in Clusters  Gram Positive Rods    Specimen Source: .Blood Blood-Peripheral    Organism: Blood Culture PCR    Culture Results:   Growth in aerobic and anaerobic bottles: Coag Negative Staphylococcus  Single set isolate, possible contaminant. Contact  Microbiology if susceptibility testing clinically        -                Physical Examination:  PULM: bilat exp wheeze throughout, min lt mucous production  CVS: S1, S2 heard    RADIOLOGY REVIEWED  CXR: < from: Xray Chest 1 View AP -PORTABLE-Routine (17 @ 07:08) >    Impression:    The heart isenlarged. Bilateral pleural effusion. All life supporting   devices are in good position and unchanged when compared to previous   study done 2017. No pneumothorax.          CT chest:< from: CT Angio Chest w/ IV Cont (09.15.17 @ 00:51) >    IMPRESSION:     No pulmonary embolism.    Status post distal esophagectomy with gastric pull-through.   Evaluation   for persistent leak is limited on this examination without contrast.    Moderate/large left pleural effusion, increased in size since 2017.   Stable small/moderate loculated right pleural effusion with a right-sided   chest tube in place.    Small pericardial effusion is new since 2017.    Mild subcutaneous edema.    < end of copied text > Follow-up Pulm Progress Note    No new respiratory events overnight.  Denies SOB/CP. 02 sat 95% 0n 3l nc, pt reporting cough. returned from esophagram, pt reports + cough with lt mucous and some barium  R chest drain/bulb. L ct water seal, still on kamilah    Medications:  MEDICATIONS  (STANDING):  ALBUTerol/ipratropium for Nebulization 3 milliLiter(s) Nebulizer every 6 hours  influenza   Vaccine 0.5 milliLiter(s) IntraMuscular once  cefepime  IVPB 1000 milliGRAM(s) IV Intermittent every 12 hours  metroNIDAZOLE  IVPB 500 milliGRAM(s) IV Intermittent every 8 hours  cyanocobalamin Injectable 500 MICROGram(s) SubCutaneous daily  pyridoxine Injectable 100 milliGRAM(s) IV Push daily  folic acid Injectable 1 milliGRAM(s) IV Push daily  fosphenytoin IVPB 200 milliGRAM(s) PE IV Intermittent every 12 hours  methylPREDNISolone sodium succinate Injectable 4 milliGRAM(s) IV Push every 24 hours  phenylephrine    Infusion 0.5 MICROgram(s)/kG/Min (11.963 mL/Hr) IV Continuous <Continuous>  clopidogrel Tablet 75 milliGRAM(s) Oral daily  aspirin  chewable 81 milliGRAM(s) Oral daily  heparin  Infusion 1000 Unit(s)/Hr (11 mL/Hr) IV Continuous <Continuous>  atorvastatin 40 milliGRAM(s) Oral at bedtime    MEDICATIONS  (PRN):  LORazepam   Injectable 0.5 milliGRAM(s) IV Push every 12 hours PRN Anxiety  sodium chloride 0.65% Nasal 1 Spray(s) Both Nostrils three times a day PRN Nasal Congestion    Vital Signs Last 24 Hrs  T(C): 36.9 (19 Sep 2017 03:00), Max: 37 (18 Sep 2017 23:15)  T(F): 98.4 (19 Sep 2017 03:00), Max: 98.6 (18 Sep 2017 23:15)  HR: 90 (19 Sep 2017 09:30) (89 - 109)  BP: 98/55 (19 Sep 2017 09:30) (92/54 - 116/66)  BP(mean): 69 (19 Sep 2017 09:30) (67 - 85)  RR: 27 (19 Sep 2017 09:30) (25 - 56)  SpO2: 98% (19 Sep 2017 09:30) (85% - 100%)    ABG - ( 19 Sep 2017 02:55 )  pH: 7.42  /  pCO2: 36    /  pO2: 83    / HCO3: 23    / Base Excess: -.5   /  SaO2: 97        VBG pH 7.37  @ 23:15    VBG pCO2 44  @ 23:15    VBG O2 sat 38  @ 23:15    VBG lactate 1.5  @ 23:15       @ 07:01  -   @ 07:00  --------------------------------------------------------  IN: 2004 mL / OUT: 880 mL / NET: 1124 mL          LABS:                        8.9    17.2  )-----------( 312      ( 19 Sep 2017 02:57 )             27.2         139  |  105  |  29<H>  ----------------------------<  107<H>  3.6   |  21<L>  |  0.70    Ca    8.7      19 Sep 2017 02:57  Phos  2.3       Mg     2.3         TPro  6.2  /  Alb  2.5<L>  /  TBili  0.6  /  DBili  0.4<H>  /  AST  34  /  ALT  27  /  AlkPhos  248<H>        CARDIAC MARKERS ( 19 Sep 2017 09:24 )  x     / 0.54 ng/mL / 122 U/L / x     / 7.7 ng/mL  CARDIAC MARKERS ( 19 Sep 2017 01:01 )  x     / 0.54 ng/mL / 100 U/L / x     / 8.1 ng/mL  CARDIAC MARKERS ( 18 Sep 2017 18:38 )  x     / 0.54 ng/mL / 39 U/L / x     / 5.6 ng/mL  CARDIAC MARKERS ( 18 Sep 2017 12:54 )  x     / 0.42 ng/mL / 39 U/L / x     / 5.5 ng/mL  CARDIAC MARKERS ( 18 Sep 2017 06:48 )  x     / 0.30 ng/mL / 28 U/L / x     / 4.0 ng/mL  CARDIAC MARKERS ( 18 Sep 2017 00:56 )  x     / 0.36 ng/mL / 29 U/L / x     / 4.2 ng/mL  CARDIAC MARKERS ( 17 Sep 2017 22:53 )  x     / 0.40 ng/mL / 38 U/L / x     / 4.1 ng/mL      CAPILLARY BLOOD GLUCOSE        PT/INR - ( 18 Sep 2017 03:29 )   PT: 18.2 sec;   INR: 1.67 ratio         PTT - ( 19 Sep 2017 01:01 )  PTT:62.9 sec  Urinalysis Basic - ( 18 Sep 2017 06:06 )    Color: Yellow / Appearance: x / S.021 / pH: x  Gluc: x / Ketone: Negative  / Bili: Negative / Urobili: 1   Blood: x / Protein: 30 mg/dL / Nitrite: Negative   Leuk Esterase: Trace / RBC: x / WBC 2-5 /HPF   Sq Epi: x / Non Sq Epi: x / Bacteria: Few /HPF      Procalcitonin, Serum: 0.24 ng/mL (18 Sep 2017 03:30)    Serum Pro-Brain Natriuretic Peptide: 3503 pg/mL (18 Sep 2017 06:48)    Fluid characteristics  --  @ 08:09    exudate effuion  pH >8    tprot 1.9  seurum ldh 230  Cell count  Appearance Cloudy  Fluid type --  BF lymph 9  color Yellow  eosinophil 2  PMN 55  Mesothelial --  Monocyte 34  Other body cells --  Fluid characteristics  -- 09-15 @ 20:21      Culture - Blood (17 @ 08:34)    Specimen Source: .Blood Blood    Culture Results:   No growth to date.    Culture - Body Fluid with Gram Stain (17 @ 12:42)    Gram Stain:   No organisms seen per oil power field  polymorphonuclear leukocytes seen     per low power field  by cytocentrifuge    Specimen Source: Pleural Fl Other, Pleural Fluid    Culture Results:   No growth to date.    Culture - Blood (17 @ 15:30)    Specimen Source: .Blood Blood-Venous    Culture Results:   No growth to date.    Culture - Blood (17 @ 15:30)    Specimen Source: .Blood Blood-Venous    Culture Results:   No growth to date.    Culture - Blood (09.15.17 @ 07:46)    -  Coagulase negative Staphylococcus: Detec    Gram Stain:   Growth in anaerobic bottle: Gram Positive Cocci in Clusters  Growth in aerobic bottle:  Gram Positive Cocci in Clusters  Gram Positive Rods    Specimen Source: .Blood Blood-Peripheral    Organism: Blood Culture PCR    Culture Results:   Growth in aerobic and anaerobic bottles: Coag Negative Staphylococcus  Single set isolate, possible contaminant. Contact  Microbiology if susceptibility testing clinically      Physical Examination:  PULM: bilat exp wheeze throughout, min lt mucous production  CVS: S1, S2 heard    RADIOLOGY REVIEWED  CXR: < from: Xray Chest 1 View AP -PORTABLE-Routine (17 @ 07:08) >    Impression:    The heart isenlarged. Bilateral pleural effusion. All life supporting   devices are in good position and unchanged when compared to previous   study done 2017. No pneumothorax.          CT chest:< from: CT Angio Chest w/ IV Cont (09.15.17 @ 00:51) >    IMPRESSION:     No pulmonary embolism.    Status post distal esophagectomy with gastric pull-through.   Evaluation   for persistent leak is limited on this examination without contrast.    Moderate/large left pleural effusion, increased in size since 2017.   Stable small/moderate loculated right pleural effusion with a right-sided   chest tube in place.    Small pericardial effusion is new since 2017.    Mild subcutaneous edema.    < end of copied text >

## 2017-09-19 NOTE — PROGRESS NOTE ADULT - SUBJECTIVE AND OBJECTIVE BOX
SURGERY NOTE    No acute events overnight. Patient remains on phenylephrine. Left chest pigtail put on water seal, patient comfortably breathing on nasal cannula.  pRBC x 1 unit given yesterday (passing melenotic stools)      MEDICATIONS  (STANDING):  ALBUTerol/ipratropium for Nebulization 3 milliLiter(s) Nebulizer every 6 hours  influenza   Vaccine 0.5 milliLiter(s) IntraMuscular once  cefepime  IVPB 1000 milliGRAM(s) IV Intermittent every 12 hours  metroNIDAZOLE  IVPB 500 milliGRAM(s) IV Intermittent every 8 hours  cyanocobalamin Injectable 500 MICROGram(s) SubCutaneous daily  pyridoxine Injectable 100 milliGRAM(s) IV Push daily  folic acid Injectable 1 milliGRAM(s) IV Push daily  fosphenytoin IVPB 200 milliGRAM(s) PE IV Intermittent every 12 hours  methylPREDNISolone sodium succinate Injectable 4 milliGRAM(s) IV Push every 24 hours  phenylephrine    Infusion 0.5 MICROgram(s)/kG/Min (11.963 mL/Hr) IV Continuous <Continuous>  clopidogrel Tablet 75 milliGRAM(s) Oral daily  aspirin  chewable 81 milliGRAM(s) Oral daily  heparin  Infusion 1000 Unit(s)/Hr (11 mL/Hr) IV Continuous <Continuous>  atorvastatin 40 milliGRAM(s) Oral at bedtime    MEDICATIONS  (PRN):  LORazepam   Injectable 0.5 milliGRAM(s) IV Push every 12 hours PRN Anxiety  sodium chloride 0.65% Nasal 1 Spray(s) Both Nostrils three times a day PRN Nasal Congestion      VITALS & I/Os:  Vital Signs Last 24 Hrs  T(C): 36.9 (19 Sep 2017 03:00), Max: 37 (18 Sep 2017 23:15)  T(F): 98.4 (19 Sep 2017 03:00), Max: 98.6 (18 Sep 2017 23:15)  HR: 90 (19 Sep 2017 09:30) (89 - 109)  BP: 98/55 (19 Sep 2017 09:30) (92/54 - 124/68)  BP(mean): 69 (19 Sep 2017 09:30) (67 - 87)  RR: 27 (19 Sep 2017 09:30) (24 - 56)  SpO2: 98% (19 Sep 2017 09:30) (85% - 100%)  CAPILLARY BLOOD GLUCOSE          I&O's Summary    18 Sep 2017 07:01  -  19 Sep 2017 07:00  --------------------------------------------------------  IN: 2004 mL / OUT: 880 mL / NET: 1124 mL    19 Sep 2017 07:01  -  19 Sep 2017 09:52  --------------------------------------------------------  IN: 354.1 mL / OUT: 85 mL / NET: 269.1 mL          GEN: NAD, alert and oriented x 3  HEENT: WNL  CHEST: Symmetrical chest rise, forced expiration  ABD: Soft, non-tender, non-distended  EXT/VASC: No peripheral edema, warm, motor and sensory intact    LABS:                        8.9    17.2  )-----------( 312      ( 19 Sep 2017 02:57 )             27.2         139  |  105  |  29<H>  ----------------------------<  107<H>  3.6   |  21<L>  |  0.70    Ca    8.7      19 Sep 2017 02:57  Phos  2.3       Mg     2.3         TPro  6.2  /  Alb  2.5<L>  /  TBili  0.6  /  DBili  0.4<H>  /  AST  34  /  ALT  27  /  AlkPhos  248<H>      Lactate:  PT/INR - ( 18 Sep 2017 03:29 )   PT: 18.2 sec;   INR: 1.67 ratio         PTT - ( 19 Sep 2017 01:01 )  PTT:62.9 sec  ABG - ( 19 Sep 2017 02:55 )  pH: 7.42  /  pCO2: 36    /  pO2: 83    / HCO3: 23    / Base Excess: -.5   /  SaO2: 97                CARDIAC MARKERS ( 19 Sep 2017 01:01 )  x     / 0.54 ng/mL / 100 U/L / x     / 8.1 ng/mL  CARDIAC MARKERS ( 18 Sep 2017 18:38 )  x     / 0.54 ng/mL / 39 U/L / x     / 5.6 ng/mL  CARDIAC MARKERS ( 18 Sep 2017 12:54 )  x     / 0.42 ng/mL / 39 U/L / x     / 5.5 ng/mL  CARDIAC MARKERS ( 18 Sep 2017 06:48 )  x     / 0.30 ng/mL / 28 U/L / x     / 4.0 ng/mL  CARDIAC MARKERS ( 18 Sep 2017 00:56 )  x     / 0.36 ng/mL / 29 U/L / x     / 4.2 ng/mL  CARDIAC MARKERS ( 17 Sep 2017 22:53 )  x     / 0.40 ng/mL / 38 U/L / x     / 4.1 ng/mL        Urinalysis Basic - ( 18 Sep 2017 06:06 )    Color: Yellow / Appearance: x / S.021 / pH: x  Gluc: x / Ketone: Negative  / Bili: Negative / Urobili: 1   Blood: x / Protein: 30 mg/dL / Nitrite: Negative   Leuk Esterase: Trace / RBC: x / WBC 2-5 /HPF   Sq Epi: x / Non Sq Epi: x / Bacteria: Few /HPF        IMAGING:  CXR  - L pigtail in place, effusion resolved, b/l pulmonary vascular congestion    ASSESSMENT & PLAN  82y M, anastomotic leak after Huger-Trent, respiratory distress due to pleural effusion(s), s/p L effusion drainage via pigtail.   - Continue antibiosis, f/u pleural fluid culture (NGTD)  - Monitor respiratory status on water seal  - ASA/Plavix per Cards recommendations - f/u today (re: need for cath, etc.)  - Will discuss with attending      Discussed with

## 2017-09-19 NOTE — CHART NOTE - NSCHARTNOTEFT_GEN_A_CORE
Hospital Course: Pt s/p esophagogastrectomy (partial) with intra-thoracic esophagogastric anastomosis (Vance-Trent procedure) on 8/14/17 - post-op complicated by anastomotic leak, discharged to [facility] on 8/27 with R chest drain in place. He was admitted from rehab with clogged jejunostomy feeding tube.     SICU RD f/u: Pt and wife report pt has been on tube feeds since surgery in August 2017, ongoing in [facility], however unable to recall prior formula or infusion rate. Per wife, pt gets diarrhea "every time he is in an ICU", but improved with Imodium. Pt currently ordered for Vital, a semi-elemental formula considered to be a good option for patients with TF intolerance.     Of note, wife reports patients UBW in February 2017 was 160 pounds. Pt was diagnosed with malnutrition in August 2017 with a weight of 121 pounds. Pt currently 144 pounds this admit, suggesting some weight regain. Pt noted with severe muscle wasting of temples and clavicles.    Source: Patient [X ]    Family [X ]     other [ X]; medical record, team rounds    Diet : NPO with ice chips + EN      Patient reports  [ X] diarrhea : Wife reports recurrent diarrhea since EN initiated in August 2017. +2BM 9/19.     Enteral /Parenteral Nutrition: Vital 1.2 @ 60ml/hr x 24 hours to provide 1440 ml formula, 1728cal/day (27cal/Kg), 108Gm protein/day (1.7Gm/Kg), 1168ml free water per dosing wt 63.8Kg.    TF stopped temporarily yesterday, now infusing at goal. 24-hour provision = 750ml (as of 9/19), 1150ml (as of 9/18).      Current Weight: Weight (kg): 66.4Kg (9/19), 63.8 (09-15 @ 16:00)  Edema: 1+ generalized    Pertinent Medications: MEDICATIONS  (STANDING):  influenza   Vaccine 0.5 milliLiter(s) IntraMuscular once  cefepime  IVPB 1000 milliGRAM(s) IV Intermittent every 12 hours  metroNIDAZOLE  IVPB 500 milliGRAM(s) IV Intermittent every 8 hours  cyanocobalamin Injectable 500 MICROGram(s) SubCutaneous daily  pyridoxine Injectable 100 milliGRAM(s) IV Push daily  folic acid Injectable 1 milliGRAM(s) IV Push daily  fosphenytoin IVPB 200 milliGRAM(s) PE IV Intermittent every 12 hours  methylPREDNISolone sodium succinate Injectable 4 milliGRAM(s) IV Push every 24 hours  phenylephrine    Infusion 0.5 MICROgram(s)/kG/Min (11.963 mL/Hr) IV Continuous <Continuous>  clopidogrel Tablet 75 milliGRAM(s) Oral daily  aspirin  chewable 81 milliGRAM(s) Oral daily  heparin  Infusion 1000 Unit(s)/Hr (11 mL/Hr) IV Continuous <Continuous>  atorvastatin 40 milliGRAM(s) Oral at bedtime  ALBUTerol/ipratropium for Nebulization 3 milliLiter(s) Nebulizer every 4 hours    MEDICATIONS  (PRN):  LORazepam   Injectable 0.5 milliGRAM(s) IV Push every 12 hours PRN Anxiety  sodium chloride 0.65% Nasal 1 Spray(s) Both Nostrils three times a day PRN Nasal Congestion    Pertinent Labs:  09-19 Na139 mmol/L Glu 107 mg/dL<H> K+ 3.6 mmol/L Cr  0.70 mg/dL BUN 29 mg/dL<H> Phos 2.3 mg/dL<L>        Skin: no pressure injuries    Estimated Needs:   [ X] no change since previous assessment  [ ] recalculated:       Previous Nutrition Diagnosis:       Nutrition Diagnosis is [ ] ongoing  [X ] resolving with EN at goal       New Nutrition Diagnosis:    [X ] Malnutrition ; Moderate, in setting of chronic illness       Related to:  esophageal adenocarcinoma      As evidenced by: h/o ~10% weight loss in ~7months, muscle mass depletion of temples and clavicles     Interventions:     Recommend    [ ] Change Diet To:    [ ] Nutrition Supplement    [X ] Nutrition Support: continue EN as above    [ ] Other:        Monitoring and Evaluation:     [ ] PO intake [ ] Tolerance to diet prescription [X ] weights [X ] follow up per protocol    [X ] other:

## 2017-09-19 NOTE — PROGRESS NOTE ADULT - SUBJECTIVE AND OBJECTIVE BOX
SUBJECTIVE / OVERNIGHT EVENTS: No nausea, vomiting or diarrhea, no fever or chills, no dizziness or chest pain,     in  a chair      CAPILLARY BLOOD GLUCOSE        I&O's Summary    18 Sep 2017 07:  -  19 Sep 2017 07:00  --------------------------------------------------------  IN: 2004 mL / OUT: 880 mL / NET: 1124 mL    19 Sep 2017 07:  -  19 Sep 2017 11:31  --------------------------------------------------------  IN: 354.1 mL / OUT: 85 mL / NET: 269.1 mL        PHYSICAL EXAM:  HEAD:  Atraumatic, Normocephalic  EYES: EOMI, PERRLA, conjunctiva and sclera clear  NECK: Supple, No JVD  CHEST/LUNG:   left  chest  tube  HEART: Regular rate and rhythm; No murmurs, rubs, or gallops  ABDOMEN: Soft, Nontender, Nondistended; Bowel sounds present  EXTREMITIES:  2+ Peripheral Pulses, No clubbing, cyanosis, or edema  PSYCH: AAOx3  NEUROLOGY: non-focal  SKIN: No rashes or lesions    MEDICATIONS  (STANDING):  ALBUTerol/ipratropium for Nebulization 3 milliLiter(s) Nebulizer every 6 hours  influenza   Vaccine 0.5 milliLiter(s) IntraMuscular once  cefepime  IVPB 1000 milliGRAM(s) IV Intermittent every 12 hours  metroNIDAZOLE  IVPB 500 milliGRAM(s) IV Intermittent every 8 hours  cyanocobalamin Injectable 500 MICROGram(s) SubCutaneous daily  pyridoxine Injectable 100 milliGRAM(s) IV Push daily  folic acid Injectable 1 milliGRAM(s) IV Push daily  fosphenytoin IVPB 200 milliGRAM(s) PE IV Intermittent every 12 hours  methylPREDNISolone sodium succinate Injectable 4 milliGRAM(s) IV Push every 24 hours  phenylephrine    Infusion 0.5 MICROgram(s)/kG/Min (11.963 mL/Hr) IV Continuous <Continuous>  clopidogrel Tablet 75 milliGRAM(s) Oral daily  aspirin  chewable 81 milliGRAM(s) Oral daily  heparin  Infusion 1000 Unit(s)/Hr (11 mL/Hr) IV Continuous <Continuous>  atorvastatin 40 milliGRAM(s) Oral at bedtime    MEDICATIONS  (PRN):  LORazepam   Injectable 0.5 milliGRAM(s) IV Push every 12 hours PRN Anxiety  sodium chloride 0.65% Nasal 1 Spray(s) Both Nostrils three times a day PRN Nasal Congestion      LABS:                        8.9    17.2  )-----------( 312      ( 19 Sep 2017 02:57 )             27.2     09-    139  |  105  |  29<H>  ----------------------------<  107<H>  3.6   |  21<L>  |  0.70    Ca    8.7      19 Sep 2017 02:57  Phos  2.3       Mg     2.3         TPro  6.2  /  Alb  2.5<L>  /  TBili  0.6  /  DBili  0.4<H>  /  AST  34  /  ALT  27  /  AlkPhos  248<H>      PT/INR - ( 18 Sep 2017 03:29 )   PT: 18.2 sec;   INR: 1.67 ratio         PTT - ( 19 Sep 2017 01:01 )  PTT:62.9 sec  CARDIAC MARKERS ( 19 Sep 2017 09:24 )  x     / 0.54 ng/mL / 122 U/L / x     / 7.7 ng/mL  CARDIAC MARKERS ( 19 Sep 2017 01:01 )  x     / 0.54 ng/mL / 100 U/L / x     / 8.1 ng/mL  CARDIAC MARKERS ( 18 Sep 2017 18:38 )  x     / 0.54 ng/mL / 39 U/L / x     / 5.6 ng/mL  CARDIAC MARKERS ( 18 Sep 2017 12:54 )  x     / 0.42 ng/mL / 39 U/L / x     / 5.5 ng/mL  CARDIAC MARKERS ( 18 Sep 2017 06:48 )  x     / 0.30 ng/mL / 28 U/L / x     / 4.0 ng/mL  CARDIAC MARKERS ( 18 Sep 2017 00:56 )  x     / 0.36 ng/mL / 29 U/L / x     / 4.2 ng/mL  CARDIAC MARKERS ( 17 Sep 2017 22:53 )  x     / 0.40 ng/mL / 38 U/L / x     / 4.1 ng/mL      Urinalysis Basic - ( 18 Sep 2017 06:06 )    Color: Yellow / Appearance: x / S.021 / pH: x  Gluc: x / Ketone: Negative  / Bili: Negative / Urobili: 1   Blood: x / Protein: 30 mg/dL / Nitrite: Negative   Leuk Esterase: Trace / RBC: x / WBC 2-5 /HPF   Sq Epi: x / Non Sq Epi: x / Bacteria: Few /HPF        ABG - ( 19 Sep 2017 02:55 )  pH: 7.42  /  pCO2: 36    /  pO2: 83    / HCO3: 23    / Base Excess: -.5   /  SaO2: 97                -17 @ 23:15  3.6  27    Hemoglobin A1C, Whole Blood: 5.2 % ( @ 19:48)        Cultures:    EKG:    Radiological Studies:    Consultant(s) Notes Reviewed:      Care Discussed with Consultants/Other Providers:

## 2017-09-19 NOTE — PROGRESS NOTE ADULT - SUBJECTIVE AND OBJECTIVE BOX
VITAL SIGNS    Telemetry:      Vital Signs Last 24 Hrs  T(C): 36.2 (17 @ 12:00), Max: 37 (17 @ 23:15)  T(F): 97.2 (17 @ 12:00), Max: 98.6 (17 @ 23:15)  HR: 92 (17 @ 13:15) (89 - 109)  BP: 123/56 (17 @ 13:15) (79/49 - 123/56)  RR: 34 (17 @ 13:15) (25 - 56)  SpO2: 99% (17 @ 13:15) (85% - 100%)                   Daily     Daily Weight in k.4 (19 Sep 2017 01:00)        CAPILLARY BLOOD GLUCOSE              Drains:    rt chest bulb            L Pleural  [x  ]  Drainage:                                   PHYSICAL EXAM    Neurology: alert and oriented x 3, moves all extremities with no defecits  CV :  RRR  Lungs:   CTA B/L   Lt pleural pigtail,  rt chest bulb  Abdomen: soft, nontender, nondistended, positive bowel sounds  Extremities:      no edema  no calf tenderness

## 2017-09-19 NOTE — PROGRESS NOTE ADULT - SUBJECTIVE AND OBJECTIVE BOX
No acute events overnight. Patient remains on phenylephrine. Left chest pigtail put on water seal, patient comfortably breathing on nasal cannula.  pRBC x 1 unit given yesterday (passing melenotic stools)      MEDICATIONS  (STANDING):  ALBUTerol/ipratropium for Nebulization 3 milliLiter(s) Nebulizer every 6 hours  influenza   Vaccine 0.5 milliLiter(s) IntraMuscular once  cefepime  IVPB 1000 milliGRAM(s) IV Intermittent every 12 hours  metroNIDAZOLE  IVPB 500 milliGRAM(s) IV Intermittent every 8 hours  cyanocobalamin Injectable 500 MICROGram(s) SubCutaneous daily  pyridoxine Injectable 100 milliGRAM(s) IV Push daily  folic acid Injectable 1 milliGRAM(s) IV Push daily  fosphenytoin IVPB 200 milliGRAM(s) PE IV Intermittent every 12 hours  methylPREDNISolone sodium succinate Injectable 4 milliGRAM(s) IV Push every 24 hours  phenylephrine    Infusion 0.5 MICROgram(s)/kG/Min (11.963 mL/Hr) IV Continuous <Continuous>  clopidogrel Tablet 75 milliGRAM(s) Oral daily  aspirin  chewable 81 milliGRAM(s) Oral daily  heparin  Infusion 1000 Unit(s)/Hr (11 mL/Hr) IV Continuous <Continuous>  atorvastatin 40 milliGRAM(s) Oral at bedtime    MEDICATIONS  (PRN):  LORazepam   Injectable 0.5 milliGRAM(s) IV Push every 12 hours PRN Anxiety  sodium chloride 0.65% Nasal 1 Spray(s) Both Nostrils three times a day PRN Nasal Congestion      VITALS & I/Os:  Vital Signs Last 24 Hrs  T(C): 36.9 (19 Sep 2017 03:00), Max: 37 (18 Sep 2017 23:15)  T(F): 98.4 (19 Sep 2017 03:00), Max: 98.6 (18 Sep 2017 23:15)  HR: 90 (19 Sep 2017 09:30) (89 - 109)  BP: 98/55 (19 Sep 2017 09:30) (92/54 - 124/68)  BP(mean): 69 (19 Sep 2017 09:30) (67 - 87)  RR: 27 (19 Sep 2017 09:30) (24 - 56)  SpO2: 98% (19 Sep 2017 09:30) (85% - 100%)  CAPILLARY BLOOD GLUCOSE          I&O's Summary    18 Sep 2017 07:01  -  19 Sep 2017 07:00  --------------------------------------------------------  IN: 2004 mL / OUT: 880 mL / NET: 1124 mL    19 Sep 2017 07:01  -  19 Sep 2017 09:52  --------------------------------------------------------  IN: 354.1 mL / OUT: 85 mL / NET: 269.1 mL          GEN: NAD, alert and oriented x 3  HEENT: WNL  CHEST: Symmetrical chest rise, forced expiration  ABD: Soft, non-tender, non-distended  EXT/VASC: No peripheral edema, warm, motor and sensory intact    LABS:                        8.9    17.2  )-----------( 312      ( 19 Sep 2017 02:57 )             27.2         139  |  105  |  29<H>  ----------------------------<  107<H>  3.6   |  21<L>  |  0.70    Ca    8.7      19 Sep 2017 02:57  Phos  2.3       Mg     2.3         TPro  6.2  /  Alb  2.5<L>  /  TBili  0.6  /  DBili  0.4<H>  /  AST  34  /  ALT  27  /  AlkPhos  248<H>      Lactate:  PT/INR - ( 18 Sep 2017 03:29 )   PT: 18.2 sec;   INR: 1.67 ratio         PTT - ( 19 Sep 2017 01:01 )  PTT:62.9 sec  ABG - ( 19 Sep 2017 02:55 )  pH: 7.42  /  pCO2: 36    /  pO2: 83    / HCO3: 23    / Base Excess: -.5   /  SaO2: 97                CARDIAC MARKERS ( 19 Sep 2017 01:01 )  x     / 0.54 ng/mL / 100 U/L / x     / 8.1 ng/mL  CARDIAC MARKERS ( 18 Sep 2017 18:38 )  x     / 0.54 ng/mL / 39 U/L / x     / 5.6 ng/mL  CARDIAC MARKERS ( 18 Sep 2017 12:54 )  x     / 0.42 ng/mL / 39 U/L / x     / 5.5 ng/mL  CARDIAC MARKERS ( 18 Sep 2017 06:48 )  x     / 0.30 ng/mL / 28 U/L / x     / 4.0 ng/mL  CARDIAC MARKERS ( 18 Sep 2017 00:56 )  x     / 0.36 ng/mL / 29 U/L / x     / 4.2 ng/mL  CARDIAC MARKERS ( 17 Sep 2017 22:53 )  x     / 0.40 ng/mL / 38 U/L / x     / 4.1 ng/mL        Urinalysis Basic - ( 18 Sep 2017 06:06 )    Color: Yellow / Appearance: x / S.021 / pH: x  Gluc: x / Ketone: Negative  / Bili: Negative / Urobili: 1   Blood: x / Protein: 30 mg/dL / Nitrite: Negative   Leuk Esterase: Trace / RBC: x / WBC 2-5 /HPF   Sq Epi: x / Non Sq Epi: x / Bacteria: Few /HPF        IMAGING:  CXR  - L pigtail in place, effusion resolved, b/l pulmonary vascular congestion    ASSESSMENT & PLAN  82y M, anastomotic leak after [place]-Trent, respiratory distress due to pleural effusion(s), s/p L effusion drainage via pigtail.   - Continue antibiosis, f/u pleural fluid culture (NGTD)  - Monitor respiratory status on water seal

## 2017-09-19 NOTE — PROGRESS NOTE ADULT - SUBJECTIVE AND OBJECTIVE BOX
INTERVAL HPI/OVERNIGHT EVENTS: stable, OOB-chair, for esophagram today    MEDICATIONS  (STANDING):  ALBUTerol/ipratropium for Nebulization 3 milliLiter(s) Nebulizer every 6 hours  influenza   Vaccine 0.5 milliLiter(s) IntraMuscular once  cefepime  IVPB 1000 milliGRAM(s) IV Intermittent every 12 hours  metroNIDAZOLE  IVPB 500 milliGRAM(s) IV Intermittent every 8 hours  cyanocobalamin Injectable 500 MICROGram(s) SubCutaneous daily  pyridoxine Injectable 100 milliGRAM(s) IV Push daily  folic acid Injectable 1 milliGRAM(s) IV Push daily  fosphenytoin IVPB 200 milliGRAM(s) PE IV Intermittent every 12 hours  methylPREDNISolone sodium succinate Injectable 4 milliGRAM(s) IV Push every 24 hours  phenylephrine    Infusion 0.5 MICROgram(s)/kG/Min (11.963 mL/Hr) IV Continuous <Continuous>  clopidogrel Tablet 75 milliGRAM(s) Oral daily  aspirin  chewable 81 milliGRAM(s) Oral daily  heparin  Infusion 1000 Unit(s)/Hr (11 mL/Hr) IV Continuous <Continuous>  atorvastatin 40 milliGRAM(s) Oral at bedtime    MEDICATIONS  (PRN):  LORazepam   Injectable 0.5 milliGRAM(s) IV Push every 12 hours PRN Anxiety  sodium chloride 0.65% Nasal 1 Spray(s) Both Nostrils three times a day PRN Nasal Congestion      Allergies    penicillin (Rash)    Intolerances            PHYSICAL EXAM:   Vital Signs:  Vital Signs Last 24 Hrs  T(C): 36.9 (19 Sep 2017 03:00), Max: 37 (18 Sep 2017 23:15)  T(F): 98.4 (19 Sep 2017 03:00), Max: 98.6 (18 Sep 2017 23:15)  HR: 96 (19 Sep 2017 08:30) (89 - 109)  BP: 100/65 (19 Sep 2017 08:00) (92/55 - 124/68)  BP(mean): 73 (19 Sep 2017 08:00) (70 - 87)  RR: 31 (19 Sep 2017 08:30) (24 - 56)  SpO2: 95% (19 Sep 2017 08:30) (85% - 100%)  Daily     Daily Weight in k.4 (19 Sep 2017 01:00)    GENERAL:  no distress  HEENT:  NC/AT,  anicteric  CHEST:   no increased effort, drain in place  HEART:  Regular rhythm  ABDOMEN:  Soft, non-tender, non-distended, normoactive bowel sounds,  no masses   EXTEREMITIES:  no cyanosis      LABS:                        8.9    17.2  )-----------( 312      ( 19 Sep 2017 02:57 )             27.2         139  |  105  |  29<H>  ----------------------------<  107<H>  3.6   |  21<L>  |  0.70    Ca    8.7      19 Sep 2017 02:57  Phos  2.3       Mg     2.3         TPro  6.2  /  Alb  2.5<L>  /  TBili  0.6  /  DBili  0.4<H>  /  AST  34  /  ALT  27  /  AlkPhos  248<H>      PT/INR - ( 18 Sep 2017 03:29 )   PT: 18.2 sec;   INR: 1.67 ratio         PTT - ( 19 Sep 2017 01:01 )  PTT:62.9 sec  Urinalysis Basic - ( 18 Sep 2017 06:06 )    Color: Yellow / Appearance: x / S.021 / pH: x  Gluc: x / Ketone: Negative  / Bili: Negative / Urobili: 1   Blood: x / Protein: 30 mg/dL / Nitrite: Negative   Leuk Esterase: Trace / RBC: x / WBC 2-5 /HPF   Sq Epi: x / Non Sq Epi: x / Bacteria: Few /HPF        RADIOLOGY & ADDITIONAL TESTS:

## 2017-09-19 NOTE — PROGRESS NOTE ADULT - PROBLEM SELECTOR PLAN 4
wbc remains elevated  all cx neg, pleural fluid cx is negative -Left pleural effusion = exudate  await esophagram to eval for leak  Consider sending R CT for pleural fluid analysis(if draining/working) -reported that is drained dark fluid prior to admit to hosp.  Thoracic surgery for R CT  On broad spectrum abx; cefepime, flagyl, vanco  all cx neg thus far

## 2017-09-19 NOTE — CHART NOTE - NSCHARTNOTEFT_GEN_A_CORE
Upon Nutritional Assessment by the Registered Dietitian your patient was determined to meet criteria / has evidence of the following diagnosis/diagnoses:          [ ]  Mild Protein Calorie Malnutrition        [ X]  Moderate Protein Calorie Malnutrition        [ ] Severe Protein Calorie Malnutrition        [ ] Unspecified Protein Calorie Malnutrition        [ ] Underweight / BMI <19        [ ] Morbid Obesity / BMI > 40      Findings as based on:  [X ] Comprehensive nutrition assessment   [X ] Nutrition Focused Physical Exam  [X ] Other:  ~10% weight loss in ~7months, muscle mass depletion of temples and clavicles        Nutrition Plan/Recommendations:  continue Vital 1.2 @ 60ml/hr x 24 hours to provide 1440 ml formula, 1728cal/day (27cal/Kg), 108Gm protein/day (1.7Gm/Kg) per dosing wt 63.8Kg.    PROVIDER Section:     By signing this assessment you are acknowledging and agree with the diagnosis/diagnoses assigned by the Registered Dietitian    Comments:

## 2017-09-19 NOTE — PROGRESS NOTE ADULT - ASSESSMENT
82M w/ respiratory distress secondary to worsened left pleural effusion, s/p [place]-T[place], complicated by anastomotic l  H/OCAD, STENT 7/ 17    LEFT CHEST TUBE  AND  RIGHT , PLEUREX   PT   WITH  MARGINAL TROPONIN   PT FEELS  FINE TODAY  SEEN BY CARD  ON ASA/  PLAVIX/  LOPRESSOR, IV HEPARIN  ON IV CEFEPIME/ VANCO/ FLAGYL  ESOPHAGRAM DEFERRED   AT PRESENT  NEED  FOR  CATH DEFERRED   FOLLOW  HB

## 2017-09-19 NOTE — PROGRESS NOTE ADULT - ATTENDING COMMENTS
Patient seen and examined on AM rounds with SICU team  Awake, alert.  No complaints.  On low-dose Ryan-Synephrine to keep MAP greater than 65.  Abd; nd / nt / soft  Receiving nutrition via J-tube    Will check esophagram today to r/o ongoing leak

## 2017-09-19 NOTE — PROGRESS NOTE ADULT - SUBJECTIVE AND OBJECTIVE BOX
MEJIA LATIF is 82y M, s/p esophagogastrectomy (partial) with intra-thoracic esophagogastric anastomosis (Vance-Trent procedure) on 8/14/17 - post-op complicated by anastomotic leak, discharged to [facility] on 8/27 with R chest drain in place. He was admitted from rehab overnight with clogged jejunostomy feeding tube. He was taking medications by mouth. Patient reports ongoing SOB, states that he has not required oxygen while at rehab. Denies fevers/chills. Per patient, drainage from R chest has been minimal, murky brown fluid. The R CT drain's bulb has been difficult to keep to self-suction, as it fills with air quickly. Patient reports passage of flatus and stool. L pigtail catheter placed to drain pleural effusion. Of note, patient is on prednisone 5 mg daily for rheumatoid arthritis. Pt recently experienced episode of sternal chest pain with elevated trops to 0.4,, cardiology evaluated, now following, concern for NSTEMI, pt started on nitro gtt, currently on Heparin gtt.     24 HOUR EVENTS:   - Pt chest pain and SOB currently resolved  - now off nitro gtt  - per cards: continue heparin, aspirin clopidogrel, echo w/ EF 50%   - per cards: elevated trops likely demand ischemia   - cards to follow, considering cath MEJIA LATIF is 82y M, s/p esophagogastrectomy (partial) with intra-thoracic esophagogastric anastomosis (Vance-Trent procedure) on 8/14/17 - post-op complicated by anastomotic leak, discharged to [facility] on 8/27 with R chest drain in place. He was admitted from rehab overnight with clogged jejunostomy feeding tube. He was taking medications by mouth. Patient reports ongoing SOB, states that he has not required oxygen while at rehab. Denies fevers/chills. Per patient, drainage from R chest has been minimal, murky brown fluid. The R CT drain's bulb has been difficult to keep to self-suction, as it fills with air quickly. Patient reports passage of flatus and stool. L pigtail catheter placed to drain pleural effusion. Of note, patient is on prednisone 5 mg daily for rheumatoid arthritis. Pt recently experienced episode of sternal chest pain with elevated trops to 0.4,, cardiology evaluated, now following, concern for NSTEMI, pt started on nitro gtt, currently on Heparin gtt.     24 HOUR EVENTS:   - Pt chest pain and SOB currently resolved  - now off nitro gtt  - per cards: continue heparin, aspirin clopidogrel, echo w/ EF 50%   - per cards: elevated trops likely demand ischemia   - cards to follow, considering cath     SUBJECTIVE/ROS:  [ ] A ten-point review of systems was otherwise negative except as noted.  [ ] Due to altered mental status/intubation, subjective information were not able to be obtained from the patient. History was obtained, to the extent possible, from review of the chart and collateral sources of information.      NEURO  RASS:     GCS:     CAM ICU:  Exam: awake, alert, oriented  Meds: fosphenytoin IVPB 200 milliGRAM(s) PE IV Intermittent every 12 hours  LORazepam   Injectable 0.5 milliGRAM(s) IV Push every 12 hours PRN Anxiety    [x] Adequacy of sedation and pain control has been assessed and adjusted      RESPIRATORY  RR: 32 (09-19-17 @ 04:15) (21 - 56)  SpO2: 96% (09-19-17 @ 04:15) (85% - 100%)  Wt(kg): --  Exam: unlabored, clear to auscultation bilaterally  Mechanical Ventilation:   ABG - ( 19 Sep 2017 02:55 )  pH: 7.42  /  pCO2: 36    /  pO2: 83    / HCO3: 23    / Base Excess: -.5   /  SaO2: 97      Lactate: x        [N/A] Extubation Readiness Assessed  Meds: ALBUTerol/ipratropium for Nebulization 3 milliLiter(s) Nebulizer every 6 hours      CARDIOVASCULAR  HR: 104 (09-19-17 @ 04:15) (89 - 109)  BP: 116/66 (09-18-17 @ 22:45) (92/55 - 124/68)  BP(mean): 85 (09-18-17 @ 22:45) (70 - 89)  ABP: 128/60 (09-19-17 @ 04:15) (70/66 - 235/235)  ABP(mean): 83 (09-19-17 @ 04:15) (65 - 235)  Wt(kg): --  CVP(cm H2O): --  VBG - ( 17 Sep 2017 23:15 )  pH: 7.37  /  pCO2: 44    /  pO2: 27    / HCO3: 24    / Base Excess: -0.2  /  SaO2: 38     Lactate: 1.5        Exam: regular rate and rhythm  Cardiac Rhythm: sinus  Perfusion     [x]Adequate   [ ]Inadequate  Mentation   [x]Normal       [ ]Reduced  Extremities  [x]Warm         [ ]Cool  Volume Status [ ]Hypervolemic [x]Euvolemic [ ]Hypovolemic  Meds: phenylephrine    Infusion 0.5 MICROgram(s)/kG/Min IV Continuous <Continuous>      GI/NUTRITION  Exam: soft, nontender, nondistended, incision C/D/I  Diet: NPO w/ ice chips, tube feeds   Meds:     GENITOURINARY  I&O's Detail    09-17 @ 07:01  -  09-18 @ 07:00  --------------------------------------------------------  IN:    dextrose 5% + sodium chloride 0.45%.: 350 mL    heparin Infusion: 60 mL    IV PiggyBack: 800 mL    Lactated Ringers IV Bolus: 500 mL    nitroglycerin  Infusion: 7.5 mL    phenylephrine   Infusion: 183.3 mL    Solution: 100 mL    Vital HN: 1150 mL  Total IN: 3150.8 mL    OUT:    Chest Tube: 140 mL    Voided: 595 mL  Total OUT: 735 mL    Total NET: 2415.8 mL      09-18 @ 07:01  -  09-19 @ 04:56  --------------------------------------------------------  IN:    heparin Infusion: 195 mL    heparin Infusion: 30 mL    IV PiggyBack: 250 mL    Packed Red Blood Cells: 250 mL    phenylephrine   Infusion: 338.8 mL    Solution: 150 mL    Vital HN: 540 mL  Total IN: 1753.8 mL    OUT:    Voided: 770 mL  Total OUT: 770 mL    Total NET: 983.8 mL          09-19    139  |  105  |  29<H>  ----------------------------<  107<H>  3.6   |  21<L>  |  0.70    Ca    8.7      19 Sep 2017 02:57  Phos  2.3     09-19  Mg     2.3     09-19    TPro  6.2  /  Alb  2.5<L>  /  TBili  0.6  /  DBili  0.4<H>  /  AST  34  /  ALT  27  /  AlkPhos  248<H>  09-18    [ ] López catheter, indication: N/A  Meds: cyanocobalamin Injectable 500 MICROGram(s) SubCutaneous daily  pyridoxine Injectable 100 milliGRAM(s) IV Push daily  folic acid Injectable 1 milliGRAM(s) IV Push daily        HEMATOLOGIC  Meds: clopidogrel Tablet 75 milliGRAM(s) Oral daily  aspirin  chewable 81 milliGRAM(s) Oral daily  heparin  Infusion 1000 Unit(s)/Hr IV Continuous <Continuous>    [x] VTE Prophylaxis                        8.9    17.2  )-----------( 312      ( 19 Sep 2017 02:57 )             27.2     PT/INR - ( 18 Sep 2017 03:29 )   PT: 18.2 sec;   INR: 1.67 ratio         PTT - ( 19 Sep 2017 01:01 )  PTT:62.9 sec  Transfusion     [ ] PRBC   [ ] Platelets   [ ] FFP   [ ] Cryoprecipitate      INFECTIOUS DISEASES  WBC Count: 17.2 K/uL (09-19 @ 02:57)  WBC Count: 18.0 K/uL (09-18 @ 12:54)  WBC Count: 18.5 K/uL (09-18 @ 06:48)    RECENT CULTURES:    Meds: influenza   Vaccine 0.5 milliLiter(s) IntraMuscular once  cefepime  IVPB 1000 milliGRAM(s) IV Intermittent every 12 hours  metroNIDAZOLE  IVPB 500 milliGRAM(s) IV Intermittent every 8 hours        ENDOCRINE  CAPILLARY BLOOD GLUCOSE        Meds: methylPREDNISolone sodium succinate Injectable 4 milliGRAM(s) IV Push every 24 hours  atorvastatin 40 milliGRAM(s) Oral at bedtime        ACCESS DEVICES:  [ ] Peripheral IV  [ ] Central Venous Line	[ ] R	[ ] L	[ ] IJ	[ ] Fem	[ ] SC	Placed:   [ ] Arterial Line		[ ] R	[ ] L	[ ] Fem	[ ] Rad	[ ] Ax	Placed:   [ ] PICC:					[ ] Mediport  [ ] Urinary Catheter, Date Placed:   [x] Necessity of urinary, arterial, and venous catheters discussed    OTHER MEDICATIONS:  sodium chloride 0.65% Nasal 1 Spray(s) Both Nostrils three times a day PRN MEJIA LATIF is 82y M, s/p esophagogastrectomy (partial) with intra-thoracic esophagogastric anastomosis (Vance-Trent procedure) on 8/14/17 - post-op complicated by anastomotic leak, discharged to [facility] on 8/27 with R chest drain in place. He was admitted from rehab overnight with clogged jejunostomy feeding tube. He was taking medications by mouth. Patient reports ongoing SOB, states that he has not required oxygen while at rehab. Denies fevers/chills. Per patient, drainage from R chest has been minimal, murky brown fluid. The R CT drain's bulb has been difficult to keep to self-suction, as it fills with air quickly. Patient reports passage of flatus and stool. L pigtail catheter placed to drain pleural effusion. Of note, patient is on prednisone 5 mg daily for rheumatoid arthritis. Pt recently experienced episode of sternal chest pain with elevated trops to 0.4,, cardiology evaluated, now following, concern for NSTEMI, pt started on nitro gtt, currently on Heparin gtt.     24 HOUR EVENTS:   - Pt chest pain and SOB currently resolved  - now off nitro gtt  - per cards: continue heparin, aspirin clopidogrel, echo w/ EF 50%   - per cards: elevated trops likely demand ischemia   - cards to follow, considering cath     SUBJECTIVE/ROS:  [ ] A ten-point review of systems was otherwise negative except as noted.  [ ] Due to altered mental status/intubation, subjective information were not able to be obtained from the patient. History was obtained, to the extent possible, from review of the chart and collateral sources of information.      NEURO  RASS:     GCS:     CAM ICU:  Exam: awake, alert, oriented  Meds: fosphenytoin IVPB 200 milliGRAM(s) PE IV Intermittent every 12 hours  LORazepam   Injectable 0.5 milliGRAM(s) IV Push every 12 hours PRN Anxiety    [x] Adequacy of sedation and pain control has been assessed and adjusted      RESPIRATORY  RR: 32 (09-19-17 @ 04:15) (21 - 56)  SpO2: 96% (09-19-17 @ 04:15) (85% - 100%)  Wt(kg): --  Exam: unlabored, clear to auscultation bilaterally  Mechanical Ventilation:   ABG - ( 19 Sep 2017 02:55 )  pH: 7.42  /  pCO2: 36    /  pO2: 83    / HCO3: 23    / Base Excess: -.5   /  SaO2: 97      Lactate: x        [N/A] Extubation Readiness Assessed  Meds: ALBUTerol/ipratropium for Nebulization 3 milliLiter(s) Nebulizer every 6 hours      CARDIOVASCULAR  HR: 104 (09-19-17 @ 04:15) (89 - 109)  BP: 116/66 (09-18-17 @ 22:45) (92/55 - 124/68)  BP(mean): 85 (09-18-17 @ 22:45) (70 - 89)  ABP: 128/60 (09-19-17 @ 04:15) (70/66 - 235/235)  ABP(mean): 83 (09-19-17 @ 04:15) (65 - 235)  Wt(kg): --  CVP(cm H2O): --  VBG - ( 17 Sep 2017 23:15 )  pH: 7.37  /  pCO2: 44    /  pO2: 27    / HCO3: 24    / Base Excess: -0.2  /  SaO2: 38     Lactate: 1.5        Exam: regular rate and rhythm  Cardiac Rhythm: sinus  Perfusion     [x]Adequate   [ ]Inadequate  Mentation   [x]Normal       [ ]Reduced  Extremities  [x]Warm         [ ]Cool  Volume Status [ ]Hypervolemic [x]Euvolemic [ ]Hypovolemic  Meds: phenylephrine    Infusion 0.5 MICROgram(s)/kG/Min IV Continuous <Continuous>      GI/NUTRITION  Exam: soft, nontender, nondistended, incision C/D/I  Diet: NPO w/ ice chips, tube feeds   Meds:     GENITOURINARY  I&O's Detail    I&O's Detail    17 Sep 2017 07:01  -  18 Sep 2017 07:00  --------------------------------------------------------  IN:    dextrose 5% + sodium chloride 0.45%.: 350 mL    heparin Infusion: 60 mL    IV PiggyBack: 800 mL    Lactated Ringers IV Bolus: 500 mL    nitroglycerin  Infusion: 7.5 mL    phenylephrine   Infusion: 183.3 mL    Solution: 100 mL    Vital HN: 1150 mL  Total IN: 3150.8 mL    OUT:    Chest Tube: 140 mL    Voided: 595 mL  Total OUT: 735 mL    Total NET: 2415.8 mL      18 Sep 2017 07:01  -  19 Sep 2017 06:59  --------------------------------------------------------  IN:    heparin Infusion: 30 mL    heparin Infusion: 228 mL    IV PiggyBack: 250 mL	    Packed Red Blood Cells: 250 mL    phenylephrine   Infusion: 376 mL    Solution: 150 mL    Vital HN: 720 mL  Total IN: 2004 mL    OUT:    Voided: 880 mL  Total OUT: 880 mL    Total NET: 1124 mL          09-18 @ 07:01 - 09-19 @ 04:56  --------------------------------------------------------  IN:    heparin Infusion: 195 mL    heparin Infusion: 30 mL    IV PiggyBack: 250 mL    Packed Red Blood Cells: 250 mL    phenylephrine   Infusion: 338.8 mL    Solution: 150 mL    Vital HN: 540 mL  Total IN: 1753.8 mL    OUT:    Voided: 770 mL  Total OUT: 770 mL    Total NET: 983.8 mL          09-19    139  |  105  |  29<H>  ----------------------------<  107<H>  3.6   |  21<L>  |  0.70    Ca    8.7      19 Sep 2017 02:57  Phos  2.3     09-19  Mg     2.3     09-19    TPro  6.2  /  Alb  2.5<L>  /  TBili  0.6  /  DBili  0.4<H>  /  AST  34  /  ALT  27  /  AlkPhos  248<H>  09-18    [ ] López catheter, indication: N/A  Meds: cyanocobalamin Injectable 500 MICROGram(s) SubCutaneous daily  pyridoxine Injectable 100 milliGRAM(s) IV Push daily  folic acid Injectable 1 milliGRAM(s) IV Push daily        HEMATOLOGIC  Meds: clopidogrel Tablet 75 milliGRAM(s) Oral daily  aspirin  chewable 81 milliGRAM(s) Oral daily  heparin  Infusion 1000 Unit(s)/Hr IV Continuous <Continuous>    [x] VTE Prophylaxis                        8.9    17.2  )-----------( 312      ( 19 Sep 2017 02:57 )             27.2     PT/INR - ( 18 Sep 2017 03:29 )   PT: 18.2 sec;   INR: 1.67 ratio         PTT - ( 19 Sep 2017 01:01 )  PTT:62.9 sec  Transfusion     [ ] PRBC   [ ] Platelets   [ ] FFP   [ ] Cryoprecipitate      INFECTIOUS DISEASES  WBC Count: 17.2 K/uL (09-19 @ 02:57)  WBC Count: 18.0 K/uL (09-18 @ 12:54)  WBC Count: 18.5 K/uL (09-18 @ 06:48)    RECENT CULTURES:    Meds: influenza   Vaccine 0.5 milliLiter(s) IntraMuscular once  cefepime  IVPB 1000 milliGRAM(s) IV Intermittent every 12 hours  metroNIDAZOLE  IVPB 500 milliGRAM(s) IV Intermittent every 8 hours        ENDOCRINE  CAPILLARY BLOOD GLUCOSE        Meds: methylPREDNISolone sodium succinate Injectable 4 milliGRAM(s) IV Push every 24 hours  atorvastatin 40 milliGRAM(s) Oral at bedtime        ACCESS DEVICES:  [ ] Peripheral IV  [ ] Central Venous Line	[ ] R	[ ] L	[ ] IJ	[ ] Fem	[ ] SC	Placed:   [ ] Arterial Line		[ ] R	[ ] L	[ ] Fem	[ ] Rad	[ ] Ax	Placed:   [ ] PICC:					[ ] Mediport  [ ] Urinary Catheter, Date Placed:   [x] Necessity of urinary, arterial, and venous catheters discussed    OTHER MEDICATIONS:  sodium chloride 0.65% Nasal 1 Spray(s) Both Nostrils three times a day PRN MEJIA LATIF is 82y M, s/p esophagogastrectomy (partial) with intra-thoracic esophagogastric anastomosis (Vance-Trent procedure) on 8/14/17 - post-op complicated by anastomotic leak, discharged to [facility] on 8/27 with R chest drain in place. He was admitted from rehab overnight with clogged jejunostomy feeding tube. He was taking medications by mouth. Patient reports ongoing SOB, states that he has not required oxygen while at rehab. Denies fevers/chills. Per patient, drainage from R chest has been minimal, murky brown fluid. The R CT drain's bulb has been difficult to keep to self-suction, as it fills with air quickly. Patient reports passage of flatus and stool. L pigtail catheter placed to drain pleural effusion. Of note, patient is on prednisone 5 mg daily for rheumatoid arthritis. Pt recently experienced episode of sternal chest pain with elevated trops to 0.4,, cardiology evaluated, now following, concern for NSTEMI, pt started on nitro gtt, currently on Heparin gtt.     24 HOUR EVENTS:   - Pt chest pain and SOB currently resolved  - now off nitro gtt  - per cards: continue heparin, aspirin clopidogrel, echo w/ EF 50%   - per cards: elevated trops likely demand ischemia        NEURO  RASS:     GCS:     CAM ICU:  Exam: awake, alert, oriented  Meds: fosphenytoin IVPB 200 milliGRAM(s) PE IV Intermittent every 12 hours  LORazepam   Injectable 0.5 milliGRAM(s) IV Push every 12 hours PRN Anxiety    RESPIRATORY  RR: 32 (09-19-17 @ 04:15) (21 - 56)  SpO2: 96% (09-19-17 @ 04:15) (85% - 100%)  Wt(kg): --  Exam: unlabored, clear to auscultation bilaterally      CARDIOVASCULAR  HR: 104 (09-19-17 @ 04:15) (89 - 109)  BP: 116/66 (09-18-17 @ 22:45) (92/55 - 124/68)  BP(mean): 85 (09-18-17 @ 22:45) (70 - 89)  ABP: 128/60 (09-19-17 @ 04:15) (70/66 - 235/235)  ABP(mean): 83 (09-19-17 @ 04:15) (65 - 235)    Exam: regular rate and rhythm  Cardiac Rhythm: sinus  Perfusion     [x]Adequate   [ ]Inadequate  Mentation   [x]Normal       [ ]Reduced  Extremities  [x]Warm         [ ]Cool  Volume Status [ ]Hypervolemic [x]Euvolemic [ ]Hypovolemic  Meds: phenylephrine    Infusion 0.5 MICROgram(s)/kG/Min IV Continuous <Continuous>      GI/NUTRITION  Exam: soft, nontender, nondistended, incision C/D/I  Diet: NPO w/ ice chips, tube feeds   Meds:     GENITOURINARY  I&O's Detail    I&O's Detail    17 Sep 2017 07:01  -  18 Sep 2017 07:00  --------------------------------------------------------  IN:    dextrose 5% + sodium chloride 0.45%.: 350 mL    heparin Infusion: 60 mL    IV PiggyBack: 800 mL    Lactated Ringers IV Bolus: 500 mL    nitroglycerin  Infusion: 7.5 mL    phenylephrine   Infusion: 183.3 mL    Solution: 100 mL    Vital HN: 1150 mL  Total IN: 3150.8 mL    OUT:    Chest Tube: 140 mL    Voided: 595 mL  Total OUT: 735 mL    Total NET: 2415.8 mL      18 Sep 2017 07:01  -  19 Sep 2017 06:59  --------------------------------------------------------  IN:    heparin Infusion: 30 mL    heparin Infusion: 228 mL    IV PiggyBack: 250 mL	    Packed Red Blood Cells: 250 mL    phenylephrine   Infusion: 376 mL    Solution: 150 mL    Vital HN: 720 mL  Total IN: 2004 mL    OUT:    Voided: 880 mL  Total OUT: 880 mL    Total NET: 1124 mL          09-18 @ 07:01 - 09-19 @ 04:56  --------------------------------------------------------  IN:    heparin Infusion: 195 mL    heparin Infusion: 30 mL    IV PiggyBack: 250 mL    Packed Red Blood Cells: 250 mL    phenylephrine   Infusion: 338.8 mL    Solution: 150 mL    Vital HN: 540 mL  Total IN: 1753.8 mL    OUT:    Voided: 770 mL  Total OUT: 770 mL    Total NET: 983.8 mL          09-19    139  |  105  |  29<H>  ----------------------------<  107<H>  3.6   |  21<L>  |  0.70    Ca    8.7      19 Sep 2017 02:57  Phos  2.3     09-19  Mg     2.3     09-19    TPro  6.2  /  Alb  2.5<L>  /  TBili  0.6  /  DBili  0.4<H>  /  AST  34  /  ALT  27  /  AlkPhos  248<H>  09-18    [ ] López catheter, indication: N/A  Meds: cyanocobalamin Injectable 500 MICROGram(s) SubCutaneous daily  pyridoxine Injectable 100 milliGRAM(s) IV Push daily  folic acid Injectable 1 milliGRAM(s) IV Push daily        HEMATOLOGIC  Meds: clopidogrel Tablet 75 milliGRAM(s) Oral daily  aspirin  chewable 81 milliGRAM(s) Oral daily  heparin  Infusion 1000 Unit(s)/Hr IV Continuous <Continuous>    [x] VTE Prophylaxis                        8.9    17.2  )-----------( 312      ( 19 Sep 2017 02:57 )             27.2     PT/INR - ( 18 Sep 2017 03:29 )   PT: 18.2 sec;   INR: 1.67 ratio         PTT - ( 19 Sep 2017 01:01 )  PTT:62.9 sec  Transfusion     [ ] PRBC   [ ] Platelets   [ ] FFP   [ ] Cryoprecipitate      INFECTIOUS DISEASES  WBC Count: 17.2 K/uL (09-19 @ 02:57)  WBC Count: 18.0 K/uL (09-18 @ 12:54)  WBC Count: 18.5 K/uL (09-18 @ 06:48)    RECENT CULTURES:    Meds: influenza   Vaccine 0.5 milliLiter(s) IntraMuscular once  cefepime  IVPB 1000 milliGRAM(s) IV Intermittent every 12 hours  metroNIDAZOLE  IVPB 500 milliGRAM(s) IV Intermittent every 8 hours      Meds: methylPREDNISolone sodium succinate Injectable 4 milliGRAM(s) IV Push every 24 hours  atorvastatin 40 milliGRAM(s) Oral at bedtime

## 2017-09-19 NOTE — PROGRESS NOTE ADULT - ASSESSMENT
82 M presents with non-functioning J tube and SOB.  Pt diagnosed with esophageal adenocarcinoma s/p tx with chemo and radiation therapy. 8/14/17 Patient  underwent a robotic-assisted [place]-Trent esophagogastrectomy with feeding J tube placement for esophageal adenocarcinoma (T1aN0). Pt with esophageal leak-R chest tube-suction bulb. P/W non function JTube-flushed-now working. Dyspnea, hypoxia,leukocytosis, Left pleural effusion with concern for esophageal leak.  Status post pigtail catheter placement and currently feeling much better.  developed NSTEMI with hx of cad and known stents, L plueral effusion = exudate effusion 82 M presents with non-functioning J tube and SOB.  Pt diagnosed with esophageal adenocarcinoma s/p tx with chemo and radiation therapy. 8/14/17 Patient  underwent a robotic-assisted [place]-Trent esophagogastrectomy with feeding J tube placement for esophageal adenocarcinoma (T1aN0). Pt with esophageal leak-R chest tube-suction bulb. P/W non function JTube-flushed-now working. Dyspnea, hypoxia,leukocytosis, Left pleural effusion with concern for esophageal leak.  Status post pigtail catheter placement and currently feeling much better.  developed NSTEMI with hx of cad and known stents, L pleural effusion = exudate effusion

## 2017-09-19 NOTE — PROGRESS NOTE ADULT - ASSESSMENT
· Assessment		  82y M,  transferred from rehab, s/p Vance-Trent, possible anastomotic leak, possible infected pleural effusion(s). Jejunostomy feeding tube unclogged by general surgery.     -  will leave  right drain in place until  esophagram results  - Left chest tube mgnt per general surgery- found lt pigtail twisted and not draining, rt bulb draining on self suction

## 2017-09-19 NOTE — PROGRESS NOTE ADULT - PROBLEM SELECTOR PLAN 1
lt side pleural drainage,   managed by sicu team,  rt bulb to SS    keep rt tube in place until after esophagram  results    may need repeat Chest CT this week

## 2017-09-19 NOTE — PROGRESS NOTE ADULT - PROBLEM SELECTOR PLAN 5
+ NSTEMI  prior cath with cad  with PCI and 3 stents placed  echo : mod as, mild diastolic dysfunction  c/w heparin gtt  plan as per sicu, cards on board

## 2017-09-19 NOTE — PROGRESS NOTE ADULT - ASSESSMENT
82 M presents with non-functioning J tube and SOB.  Pt diagnosed with esophageal adenocarcinoma s/p tx with chemo and radiation therapy. 8/14/17 Patient  underwent a robotic-assisted [place]-Trent esophagogastrectomy with feeding J tube placement for esophageal adenocarcinoma (T1aN0). Pt with esophageal leak-R chest tube-suction bulb. P/W non function JTube-flushed-now working. Dyspnea, hypoxia,leukocytosis, Left pleural effusion with concern for esophageal leak.  Status post pigtail catheter placement and currently feeling much better.  developed NSTEMI with hx of cad and known stents, L plueral effusion = transudate effusion

## 2017-09-19 NOTE — PROGRESS NOTE ADULT - PROBLEM SELECTOR PLAN 6
keep o2 sat >=90%  coughing with wheeze on exam  returned from esophagram with reported coughing up barium/mucous ? aspiration vs true leak  consider send RVP  c/w nebulizers

## 2017-09-20 ENCOUNTER — INPATIENT (INPATIENT)
Facility: HOSPITAL | Age: 82
LOS: 13 days | Discharge: HOME HEALTH | End: 2017-10-04
Attending: THORACIC SURGERY (CARDIOTHORACIC VASCULAR SURGERY) | Admitting: THORACIC SURGERY (CARDIOTHORACIC VASCULAR SURGERY)
Payer: MEDICARE

## 2017-09-20 VITALS
TEMPERATURE: 97 F | RESPIRATION RATE: 15 BRPM | WEIGHT: 159.61 LBS | HEART RATE: 72 BPM | SYSTOLIC BLOOD PRESSURE: 117 MMHG | OXYGEN SATURATION: 100 % | DIASTOLIC BLOOD PRESSURE: 68 MMHG

## 2017-09-20 VITALS
HEART RATE: 72 BPM | DIASTOLIC BLOOD PRESSURE: 61 MMHG | SYSTOLIC BLOOD PRESSURE: 106 MMHG | RESPIRATION RATE: 18 BRPM | OXYGEN SATURATION: 98 %

## 2017-09-20 DIAGNOSIS — R78.81 BACTEREMIA: ICD-10-CM

## 2017-09-20 DIAGNOSIS — Z90.89 ACQUIRED ABSENCE OF OTHER ORGANS: Chronic | ICD-10-CM

## 2017-09-20 DIAGNOSIS — Z96.649 PRESENCE OF UNSPECIFIED ARTIFICIAL HIP JOINT: Chronic | ICD-10-CM

## 2017-09-20 DIAGNOSIS — Z98.890 OTHER SPECIFIED POSTPROCEDURAL STATES: Chronic | ICD-10-CM

## 2017-09-20 DIAGNOSIS — K91.89 OTHER POSTPROCEDURAL COMPLICATIONS AND DISORDERS OF DIGESTIVE SYSTEM: Chronic | ICD-10-CM

## 2017-09-20 DIAGNOSIS — M12.9 ARTHROPATHY, UNSPECIFIED: Chronic | ICD-10-CM

## 2017-09-20 DIAGNOSIS — R07.9 CHEST PAIN, UNSPECIFIED: ICD-10-CM

## 2017-09-20 DIAGNOSIS — J80 ACUTE RESPIRATORY DISTRESS SYNDROME: ICD-10-CM

## 2017-09-20 DIAGNOSIS — Z95.5 PRESENCE OF CORONARY ANGIOPLASTY IMPLANT AND GRAFT: Chronic | ICD-10-CM

## 2017-09-20 DIAGNOSIS — A04.7 ENTEROCOLITIS DUE TO CLOSTRIDIUM DIFFICILE: ICD-10-CM

## 2017-09-20 LAB
ANION GAP SERPL CALC-SCNC: 12 MMOL/L — SIGNIFICANT CHANGE UP (ref 5–17)
APTT BLD: 46.6 SEC — HIGH (ref 27.5–37.4)
APTT BLD: 55.7 SEC — HIGH (ref 27.5–37.4)
BUN SERPL-MCNC: 29 MG/DL — HIGH (ref 7–23)
C DIFF GDH STL QL: SIGNIFICANT CHANGE UP
C DIFF GDH STL QL: SIGNIFICANT CHANGE UP
CALCIUM SERPL-MCNC: 8.5 MG/DL — SIGNIFICANT CHANGE UP (ref 8.4–10.5)
CHLORIDE SERPL-SCNC: 108 MMOL/L — SIGNIFICANT CHANGE UP (ref 96–108)
CK MB BLD-MCNC: 10 % — HIGH (ref 0–3.5)
CK MB BLD-MCNC: 6 % — HIGH (ref 0–3.5)
CK MB BLD-MCNC: 6.6 % — HIGH (ref 0–3.5)
CK MB CFR SERPL CALC: 5.7 NG/ML — SIGNIFICANT CHANGE UP (ref 0–6.7)
CK MB CFR SERPL CALC: 8 NG/ML — HIGH (ref 0–6.7)
CK MB CFR SERPL CALC: 8.3 NG/ML — HIGH (ref 0–6.7)
CK SERPL-CCNC: 126 U/L — SIGNIFICANT CHANGE UP (ref 30–200)
CK SERPL-CCNC: 133 U/L — SIGNIFICANT CHANGE UP (ref 30–200)
CK SERPL-CCNC: 57 U/L — SIGNIFICANT CHANGE UP (ref 30–200)
CO2 SERPL-SCNC: 21 MMOL/L — LOW (ref 22–31)
CREAT SERPL-MCNC: 0.65 MG/DL — SIGNIFICANT CHANGE UP (ref 0.5–1.3)
CULTURE RESULTS: SIGNIFICANT CHANGE UP
CULTURE RESULTS: SIGNIFICANT CHANGE UP
GAS PNL BLDA: SIGNIFICANT CHANGE UP
GLUCOSE SERPL-MCNC: 104 MG/DL — HIGH (ref 70–99)
HCT VFR BLD CALC: 22.5 % — LOW (ref 39–50)
HCT VFR BLD CALC: 23.8 % — LOW (ref 39–50)
HCT VFR BLD CALC: 25.7 % — LOW (ref 39–50)
HGB BLD-MCNC: 7.8 G/DL — LOW (ref 13–17)
HGB BLD-MCNC: 7.9 G/DL — LOW (ref 13–17)
HGB BLD-MCNC: 8.6 G/DL — LOW (ref 13–17)
INR BLD: 1.68 RATIO — HIGH (ref 0.88–1.16)
MAGNESIUM SERPL-MCNC: 2.4 MG/DL — SIGNIFICANT CHANGE UP (ref 1.6–2.6)
MCHC RBC-ENTMCNC: 31.5 PG — SIGNIFICANT CHANGE UP (ref 27–34)
MCHC RBC-ENTMCNC: 31.9 PG — SIGNIFICANT CHANGE UP (ref 27–34)
MCHC RBC-ENTMCNC: 32.8 GM/DL — SIGNIFICANT CHANGE UP (ref 32–36)
MCHC RBC-ENTMCNC: 33.3 GM/DL — SIGNIFICANT CHANGE UP (ref 32–36)
MCHC RBC-ENTMCNC: 33.8 PG — SIGNIFICANT CHANGE UP (ref 27–34)
MCHC RBC-ENTMCNC: 34.9 GM/DL — SIGNIFICANT CHANGE UP (ref 32–36)
MCV RBC AUTO: 96 FL — SIGNIFICANT CHANGE UP (ref 80–100)
MCV RBC AUTO: 96.2 FL — SIGNIFICANT CHANGE UP (ref 80–100)
MCV RBC AUTO: 96.7 FL — SIGNIFICANT CHANGE UP (ref 80–100)
NT-PROBNP SERPL-SCNC: 5356 PG/ML — HIGH (ref 0–300)
PHOSPHATE SERPL-MCNC: 2.3 MG/DL — LOW (ref 2.5–4.5)
PLATELET # BLD AUTO: 239 K/UL — SIGNIFICANT CHANGE UP (ref 150–400)
PLATELET # BLD AUTO: 262 K/UL — SIGNIFICANT CHANGE UP (ref 150–400)
PLATELET # BLD AUTO: 273 K/UL — SIGNIFICANT CHANGE UP (ref 150–400)
POTASSIUM SERPL-MCNC: 3.7 MMOL/L — SIGNIFICANT CHANGE UP (ref 3.5–5.3)
POTASSIUM SERPL-SCNC: 3.7 MMOL/L — SIGNIFICANT CHANGE UP (ref 3.5–5.3)
PROTHROM AB SERPL-ACNC: 18.5 SEC — HIGH (ref 9.8–12.7)
RBC # BLD: 2.33 M/UL — LOW (ref 4.2–5.8)
RBC # BLD: 2.47 M/UL — LOW (ref 4.2–5.8)
RBC # BLD: 2.68 M/UL — LOW (ref 4.2–5.8)
RBC # FLD: 13.5 % — SIGNIFICANT CHANGE UP (ref 10.3–14.5)
RBC # FLD: 13.7 % — SIGNIFICANT CHANGE UP (ref 10.3–14.5)
RBC # FLD: 13.8 % — SIGNIFICANT CHANGE UP (ref 10.3–14.5)
SODIUM SERPL-SCNC: 141 MMOL/L — SIGNIFICANT CHANGE UP (ref 135–145)
SPECIMEN SOURCE: SIGNIFICANT CHANGE UP
SPECIMEN SOURCE: SIGNIFICANT CHANGE UP
TROPONIN T SERPL-MCNC: 0.34 NG/ML — HIGH (ref 0–0.06)
TROPONIN T SERPL-MCNC: 0.5 NG/ML — HIGH (ref 0–0.06)
TROPONIN T SERPL-MCNC: 0.54 NG/ML — HIGH (ref 0–0.06)
WBC # BLD: 14.4 K/UL — HIGH (ref 3.8–10.5)
WBC # BLD: 15.1 K/UL — HIGH (ref 3.8–10.5)
WBC # BLD: 18.1 K/UL — HIGH (ref 3.8–10.5)
WBC # FLD AUTO: 14.4 K/UL — HIGH (ref 3.8–10.5)
WBC # FLD AUTO: 15.1 K/UL — HIGH (ref 3.8–10.5)
WBC # FLD AUTO: 18.1 K/UL — HIGH (ref 3.8–10.5)

## 2017-09-20 PROCEDURE — 99291 CRITICAL CARE FIRST HOUR: CPT

## 2017-09-20 PROCEDURE — 99292 CRITICAL CARE ADDL 30 MIN: CPT | Mod: 25

## 2017-09-20 PROCEDURE — 99291 CRITICAL CARE FIRST HOUR: CPT | Mod: 25

## 2017-09-20 PROCEDURE — 36556 INSERT NON-TUNNEL CV CATH: CPT

## 2017-09-20 PROCEDURE — 99232 SBSQ HOSP IP/OBS MODERATE 35: CPT

## 2017-09-20 PROCEDURE — 99232 SBSQ HOSP IP/OBS MODERATE 35: CPT | Mod: 24

## 2017-09-20 PROCEDURE — 71010: CPT | Mod: 26

## 2017-09-20 PROCEDURE — 99231 SBSQ HOSP IP/OBS SF/LOW 25: CPT

## 2017-09-20 RX ORDER — VANCOMYCIN HCL 1 G
750 VIAL (EA) INTRAVENOUS EVERY 12 HOURS
Qty: 0 | Refills: 0 | Status: DISCONTINUED | OUTPATIENT
Start: 2017-09-20 | End: 2017-09-25

## 2017-09-20 RX ORDER — PHENYLEPHRINE HYDROCHLORIDE 10 MG/ML
0.3 INJECTION INTRAVENOUS
Qty: 80 | Refills: 0 | Status: DISCONTINUED | OUTPATIENT
Start: 2017-09-20 | End: 2017-09-22

## 2017-09-20 RX ORDER — FENTANYL CITRATE 50 UG/ML
50 INJECTION INTRAVENOUS ONCE
Qty: 0 | Refills: 0 | Status: DISCONTINUED | OUTPATIENT
Start: 2017-09-20 | End: 2017-09-20

## 2017-09-20 RX ORDER — CLOPIDOGREL BISULFATE 75 MG/1
75 TABLET, FILM COATED ORAL DAILY
Qty: 0 | Refills: 0 | Status: DISCONTINUED | OUTPATIENT
Start: 2017-09-20 | End: 2017-09-22

## 2017-09-20 RX ORDER — FOSPHENYTOIN 50 MG/ML
200 INJECTION INTRAMUSCULAR; INTRAVENOUS EVERY 12 HOURS
Qty: 0 | Refills: 0 | Status: DISCONTINUED | OUTPATIENT
Start: 2017-09-20 | End: 2017-09-26

## 2017-09-20 RX ORDER — IPRATROPIUM/ALBUTEROL SULFATE 18-103MCG
3 AEROSOL WITH ADAPTER (GRAM) INHALATION EVERY 6 HOURS
Qty: 0 | Refills: 0 | Status: DISCONTINUED | OUTPATIENT
Start: 2017-09-20 | End: 2017-09-21

## 2017-09-20 RX ORDER — SODIUM CHLORIDE 9 MG/ML
500 INJECTION, SOLUTION INTRAVENOUS ONCE
Qty: 0 | Refills: 0 | Status: COMPLETED | OUTPATIENT
Start: 2017-09-20 | End: 2017-09-20

## 2017-09-20 RX ORDER — METRONIDAZOLE 500 MG
500 TABLET ORAL EVERY 8 HOURS
Qty: 0 | Refills: 0 | Status: DISCONTINUED | OUTPATIENT
Start: 2017-09-21 | End: 2017-09-28

## 2017-09-20 RX ORDER — PROPOFOL 10 MG/ML
20 INJECTION, EMULSION INTRAVENOUS
Qty: 500 | Refills: 0 | Status: DISCONTINUED | OUTPATIENT
Start: 2017-09-20 | End: 2017-09-20

## 2017-09-20 RX ORDER — PHENYLEPHRINE HYDROCHLORIDE 10 MG/ML
0.3 INJECTION INTRAVENOUS
Qty: 80 | Refills: 0 | Status: DISCONTINUED | OUTPATIENT
Start: 2017-09-20 | End: 2017-09-20

## 2017-09-20 RX ORDER — CLOPIDOGREL BISULFATE 75 MG/1
75 TABLET, FILM COATED ORAL DAILY
Qty: 0 | Refills: 0 | Status: DISCONTINUED | OUTPATIENT
Start: 2017-09-20 | End: 2017-09-20

## 2017-09-20 RX ORDER — BUDESONIDE, MICRONIZED 100 %
1 POWDER (GRAM) MISCELLANEOUS EVERY 12 HOURS
Qty: 0 | Refills: 0 | Status: DISCONTINUED | OUTPATIENT
Start: 2017-09-20 | End: 2017-09-21

## 2017-09-20 RX ORDER — CHLORHEXIDINE GLUCONATE 213 G/1000ML
15 SOLUTION TOPICAL
Qty: 0 | Refills: 0 | Status: DISCONTINUED | OUTPATIENT
Start: 2017-09-20 | End: 2017-09-23

## 2017-09-20 RX ORDER — VANCOMYCIN HCL 1 G
125 VIAL (EA) INTRAVENOUS EVERY 6 HOURS
Qty: 0 | Refills: 0 | Status: DISCONTINUED | OUTPATIENT
Start: 2017-09-20 | End: 2017-09-20

## 2017-09-20 RX ORDER — SODIUM CHLORIDE 9 MG/ML
1000 INJECTION, SOLUTION INTRAVENOUS
Qty: 0 | Refills: 0 | Status: DISCONTINUED | OUTPATIENT
Start: 2017-09-20 | End: 2017-09-20

## 2017-09-20 RX ORDER — POTASSIUM PHOSPHATE, MONOBASIC POTASSIUM PHOSPHATE, DIBASIC 236; 224 MG/ML; MG/ML
15 INJECTION, SOLUTION INTRAVENOUS EVERY 6 HOURS
Qty: 0 | Refills: 0 | Status: COMPLETED | OUTPATIENT
Start: 2017-09-20 | End: 2017-09-20

## 2017-09-20 RX ORDER — MIDAZOLAM HYDROCHLORIDE 1 MG/ML
1 INJECTION, SOLUTION INTRAMUSCULAR; INTRAVENOUS ONCE
Qty: 0 | Refills: 0 | Status: DISCONTINUED | OUTPATIENT
Start: 2017-09-20 | End: 2017-09-20

## 2017-09-20 RX ORDER — ASPIRIN/CALCIUM CARB/MAGNESIUM 324 MG
81 TABLET ORAL DAILY
Qty: 0 | Refills: 0 | Status: DISCONTINUED | OUTPATIENT
Start: 2017-09-20 | End: 2017-09-22

## 2017-09-20 RX ORDER — METRONIDAZOLE 500 MG
500 TABLET ORAL ONCE
Qty: 0 | Refills: 0 | Status: COMPLETED | OUTPATIENT
Start: 2017-09-20 | End: 2017-09-20

## 2017-09-20 RX ORDER — PANTOPRAZOLE SODIUM 20 MG/1
40 TABLET, DELAYED RELEASE ORAL EVERY 24 HOURS
Qty: 0 | Refills: 0 | Status: DISCONTINUED | OUTPATIENT
Start: 2017-09-20 | End: 2017-09-20

## 2017-09-20 RX ORDER — VANCOMYCIN HCL 1 G
750 VIAL (EA) INTRAVENOUS EVERY 12 HOURS
Qty: 0 | Refills: 0 | Status: DISCONTINUED | OUTPATIENT
Start: 2017-09-20 | End: 2017-09-20

## 2017-09-20 RX ORDER — METRONIDAZOLE 500 MG
TABLET ORAL
Qty: 0 | Refills: 0 | Status: DISCONTINUED | OUTPATIENT
Start: 2017-09-20 | End: 2017-09-28

## 2017-09-20 RX ORDER — FOLIC ACID 0.8 MG
1 TABLET ORAL DAILY
Qty: 0 | Refills: 0 | Status: DISCONTINUED | OUTPATIENT
Start: 2017-09-20 | End: 2017-10-02

## 2017-09-20 RX ORDER — PANTOPRAZOLE SODIUM 20 MG/1
40 TABLET, DELAYED RELEASE ORAL
Qty: 0 | Refills: 0 | Status: DISCONTINUED | OUTPATIENT
Start: 2017-09-20 | End: 2017-09-21

## 2017-09-20 RX ORDER — IPRATROPIUM/ALBUTEROL SULFATE 18-103MCG
3 AEROSOL WITH ADAPTER (GRAM) INHALATION EVERY 6 HOURS
Qty: 0 | Refills: 0 | Status: DISCONTINUED | OUTPATIENT
Start: 2017-09-20 | End: 2017-09-20

## 2017-09-20 RX ORDER — HALOPERIDOL DECANOATE 100 MG/ML
5 INJECTION INTRAMUSCULAR ONCE
Qty: 0 | Refills: 0 | Status: COMPLETED | OUTPATIENT
Start: 2017-09-20 | End: 2017-09-20

## 2017-09-20 RX ORDER — PREGABALIN 225 MG/1
500 CAPSULE ORAL DAILY
Qty: 0 | Refills: 0 | Status: DISCONTINUED | OUTPATIENT
Start: 2017-09-20 | End: 2017-10-02

## 2017-09-20 RX ORDER — BUDESONIDE, MICRONIZED 100 %
0.5 POWDER (GRAM) MISCELLANEOUS EVERY 12 HOURS
Qty: 0 | Refills: 0 | Status: DISCONTINUED | OUTPATIENT
Start: 2017-09-20 | End: 2017-09-20

## 2017-09-20 RX ORDER — IPRATROPIUM/ALBUTEROL SULFATE 18-103MCG
3 AEROSOL WITH ADAPTER (GRAM) INHALATION ONCE
Qty: 0 | Refills: 0 | Status: COMPLETED | OUTPATIENT
Start: 2017-09-20 | End: 2017-09-20

## 2017-09-20 RX ORDER — CHLORHEXIDINE GLUCONATE 213 G/1000ML
15 SOLUTION TOPICAL EVERY 8 HOURS
Qty: 0 | Refills: 0 | Status: DISCONTINUED | OUTPATIENT
Start: 2017-09-20 | End: 2017-09-20

## 2017-09-20 RX ORDER — FENTANYL CITRATE 50 UG/ML
1 INJECTION INTRAVENOUS
Qty: 5000 | Refills: 0 | Status: DISCONTINUED | OUTPATIENT
Start: 2017-09-20 | End: 2017-09-20

## 2017-09-20 RX ORDER — CHLORHEXIDINE GLUCONATE 213 G/1000ML
1 SOLUTION TOPICAL DAILY
Qty: 0 | Refills: 0 | Status: DISCONTINUED | OUTPATIENT
Start: 2017-09-20 | End: 2017-09-28

## 2017-09-20 RX ORDER — PROPOFOL 10 MG/ML
5 INJECTION, EMULSION INTRAVENOUS
Qty: 1000 | Refills: 0 | Status: DISCONTINUED | OUTPATIENT
Start: 2017-09-20 | End: 2017-09-23

## 2017-09-20 RX ORDER — ASPIRIN/CALCIUM CARB/MAGNESIUM 324 MG
81 TABLET ORAL DAILY
Qty: 0 | Refills: 0 | Status: DISCONTINUED | OUTPATIENT
Start: 2017-09-20 | End: 2017-09-20

## 2017-09-20 RX ORDER — CEFEPIME 1 G/1
1000 INJECTION, POWDER, FOR SOLUTION INTRAMUSCULAR; INTRAVENOUS EVERY 12 HOURS
Qty: 0 | Refills: 0 | Status: DISCONTINUED | OUTPATIENT
Start: 2017-09-20 | End: 2017-09-28

## 2017-09-20 RX ORDER — VANCOMYCIN HCL 1 G
125 VIAL (EA) INTRAVENOUS EVERY 6 HOURS
Qty: 0 | Refills: 0 | Status: DISCONTINUED | OUTPATIENT
Start: 2017-09-20 | End: 2017-09-21

## 2017-09-20 RX ORDER — PROPOFOL 10 MG/ML
15 INJECTION, EMULSION INTRAVENOUS
Qty: 1000 | Refills: 0 | Status: DISCONTINUED | OUTPATIENT
Start: 2017-09-20 | End: 2017-09-20

## 2017-09-20 RX ORDER — PYRIDOXINE HCL (VITAMIN B6) 100 MG
100 TABLET ORAL DAILY
Qty: 0 | Refills: 0 | Status: DISCONTINUED | OUTPATIENT
Start: 2017-09-20 | End: 2017-10-02

## 2017-09-20 RX ADMIN — FENTANYL CITRATE 50 MICROGRAM(S): 50 INJECTION INTRAVENOUS at 16:37

## 2017-09-20 RX ADMIN — POTASSIUM PHOSPHATE, MONOBASIC POTASSIUM PHOSPHATE, DIBASIC 62.5 MILLIMOLE(S): 236; 224 INJECTION, SOLUTION INTRAVENOUS at 08:14

## 2017-09-20 RX ADMIN — CEFEPIME 100 MILLIGRAM(S): 1 INJECTION, POWDER, FOR SOLUTION INTRAMUSCULAR; INTRAVENOUS at 05:10

## 2017-09-20 RX ADMIN — Medication 100 MILLIGRAM(S): at 05:09

## 2017-09-20 RX ADMIN — Medication 3 MILLILITER(S): at 01:41

## 2017-09-20 RX ADMIN — FOSPHENYTOIN 108 MILLIGRAM(S) PE: 50 INJECTION INTRAMUSCULAR; INTRAVENOUS at 05:09

## 2017-09-20 RX ADMIN — Medication 81 MILLIGRAM(S): at 12:14

## 2017-09-20 RX ADMIN — Medication 100 MILLIGRAM(S): at 21:36

## 2017-09-20 RX ADMIN — MIDAZOLAM HYDROCHLORIDE 1 MILLIGRAM(S): 1 INJECTION, SOLUTION INTRAMUSCULAR; INTRAVENOUS at 11:25

## 2017-09-20 RX ADMIN — FENTANYL CITRATE 50 MICROGRAM(S): 50 INJECTION INTRAVENOUS at 16:52

## 2017-09-20 RX ADMIN — Medication 3 MILLILITER(S): at 05:29

## 2017-09-20 RX ADMIN — Medication 150 MILLIGRAM(S): at 11:16

## 2017-09-20 RX ADMIN — SODIUM CHLORIDE 1500 MILLILITER(S): 9 INJECTION, SOLUTION INTRAVENOUS at 12:38

## 2017-09-20 RX ADMIN — Medication 3 MILLILITER(S): at 09:40

## 2017-09-20 RX ADMIN — Medication 1 MILLIGRAM(S): at 16:38

## 2017-09-20 RX ADMIN — Medication 125 MILLIGRAM(S): at 12:14

## 2017-09-20 RX ADMIN — CLOPIDOGREL BISULFATE 75 MILLIGRAM(S): 75 TABLET, FILM COATED ORAL at 13:33

## 2017-09-20 RX ADMIN — PREGABALIN 500 MICROGRAM(S): 225 CAPSULE ORAL at 12:15

## 2017-09-20 RX ADMIN — Medication 20 MILLIGRAM(S): at 09:18

## 2017-09-20 RX ADMIN — PHENYLEPHRINE HYDROCHLORIDE 7.18 MICROGRAM(S)/KG/MIN: 10 INJECTION INTRAVENOUS at 20:57

## 2017-09-20 RX ADMIN — Medication 3 MILLILITER(S): at 07:27

## 2017-09-20 RX ADMIN — Medication 100 MILLIGRAM(S): at 23:31

## 2017-09-20 RX ADMIN — CEFEPIME 100 MILLIGRAM(S): 1 INJECTION, POWDER, FOR SOLUTION INTRAMUSCULAR; INTRAVENOUS at 17:49

## 2017-09-20 RX ADMIN — CHLORHEXIDINE GLUCONATE 15 MILLILITER(S): 213 SOLUTION TOPICAL at 13:44

## 2017-09-20 RX ADMIN — HALOPERIDOL DECANOATE 5 MILLIGRAM(S): 100 INJECTION INTRAMUSCULAR at 00:42

## 2017-09-20 RX ADMIN — FENTANYL CITRATE 50 MICROGRAM(S): 50 INJECTION INTRAVENOUS at 11:15

## 2017-09-20 RX ADMIN — FOSPHENYTOIN 108 MILLIGRAM(S) PE: 50 INJECTION INTRAMUSCULAR; INTRAVENOUS at 17:10

## 2017-09-20 RX ADMIN — Medication 2 MILLIGRAM(S): at 16:38

## 2017-09-20 RX ADMIN — Medication 3 MILLILITER(S): at 13:35

## 2017-09-20 RX ADMIN — HEPARIN SODIUM 13 UNIT(S)/HR: 5000 INJECTION INTRAVENOUS; SUBCUTANEOUS at 04:05

## 2017-09-20 RX ADMIN — Medication 3 MILLILITER(S): at 17:41

## 2017-09-20 RX ADMIN — PROPOFOL 5.74 MICROGRAM(S)/KG/MIN: 10 INJECTION, EMULSION INTRAVENOUS at 21:37

## 2017-09-20 RX ADMIN — Medication 100 MILLIGRAM(S): at 13:44

## 2017-09-20 RX ADMIN — FENTANYL CITRATE 50 MICROGRAM(S): 50 INJECTION INTRAVENOUS at 11:30

## 2017-09-20 RX ADMIN — Medication 0.5 MILLIGRAM(S): at 17:41

## 2017-09-20 RX ADMIN — Medication 1 MILLIGRAM(S): at 19:24

## 2017-09-20 RX ADMIN — Medication 100 MILLIGRAM(S): at 12:14

## 2017-09-20 RX ADMIN — POTASSIUM PHOSPHATE, MONOBASIC POTASSIUM PHOSPHATE, DIBASIC 62.5 MILLIMOLE(S): 236; 224 INJECTION, SOLUTION INTRAVENOUS at 12:05

## 2017-09-20 RX ADMIN — FENTANYL CITRATE 3.19 MICROGRAM(S)/KG/HR: 50 INJECTION INTRAVENOUS at 21:36

## 2017-09-20 RX ADMIN — PANTOPRAZOLE SODIUM 40 MILLIGRAM(S): 20 TABLET, DELAYED RELEASE ORAL at 12:59

## 2017-09-20 RX ADMIN — SODIUM CHLORIDE 100 MILLILITER(S): 9 INJECTION, SOLUTION INTRAVENOUS at 20:57

## 2017-09-20 NOTE — PROGRESS NOTE ADULT - SUBJECTIVE AND OBJECTIVE BOX
MEJIA LATIF 82y [id number]    Patient is a 82y old  Male who presents with a chief complaint of feeding tube clogged s/p maryjane ford (15 Sep 2017 16:33)      Follow Up/CC:  anastomotic leak    Interval History/ROS: in sicu    Allergies    penicillin (Rash)    Intolerances        ANTIMICROBIALS:  cefepime  IVPB 1000 every 12 hours  metroNIDAZOLE  IVPB 500 every 8 hours  vancomycin    Solution 125 every 6 hours  vancomycin  IVPB 750 every 12 hours      MEDICATIONS  (STANDING):  influenza   Vaccine 0.5 milliLiter(s) IntraMuscular once  cefepime  IVPB 1000 milliGRAM(s) IV Intermittent every 12 hours  metroNIDAZOLE  IVPB 500 milliGRAM(s) IV Intermittent every 8 hours  cyanocobalamin Injectable 500 MICROGram(s) SubCutaneous daily  pyridoxine Injectable 100 milliGRAM(s) IV Push daily  folic acid Injectable 1 milliGRAM(s) IV Push daily  fosphenytoin IVPB 200 milliGRAM(s) PE IV Intermittent every 12 hours  heparin  Infusion 1000 Unit(s)/Hr (14 mL/Hr) IV Continuous <Continuous>  ALBUTerol/ipratropium for Nebulization 3 milliLiter(s) Nebulizer every 4 hours  doxazosin 2 milliGRAM(s) Oral at bedtime  potassium phosphate IVPB 15 milliMole(s) IV Intermittent every 6 hours  vancomycin    Solution 125 milliGRAM(s) Oral every 6 hours  methylPREDNISolone sodium succinate Injectable 20 milliGRAM(s) IV Push every 8 hours  vancomycin  IVPB 750 milliGRAM(s) IV Intermittent every 12 hours  aspirin  chewable 81 milliGRAM(s) Oral daily  clopidogrel Tablet 75 milliGRAM(s) Oral daily  buDESOnide   0.5 milliGRAM(s) Respule 0.5 milliGRAM(s) Inhalation every 12 hours    MEDICATIONS  (PRN):  sodium chloride 0.65% Nasal 1 Spray(s) Both Nostrils three times a day PRN Nasal Congestion        Vital Signs Last 24 Hrs  T(C): 36.6 (20 Sep 2017 07:00), Max: 36.6 (20 Sep 2017 04:15)  T(F): 97.8 (20 Sep 2017 07:00), Max: 97.8 (20 Sep 2017 04:15)  HR: 104 (20 Sep 2017 09:30) (74 - 110)  BP: 93/54 (20 Sep 2017 09:30) (79/49 - 174/74)  BP(mean): 71 (20 Sep 2017 09:30) (59 - 106)  RR: 54 (20 Sep 2017 09:30) (29 - 63)  SpO2: 96% (20 Sep 2017 09:30) (89% - 100%)    CBC Full  -  ( 20 Sep 2017 08:42 )  WBC Count : 14.4 K/uL  Hemoglobin : 7.8 g/dL  Hematocrit : 23.8 %  Platelet Count - Automated : 239 K/uL  Mean Cell Volume : 96.2 fl  Mean Cell Hemoglobin : 31.5 pg  Mean Cell Hemoglobin Concentration : 32.8 gm/dL  Auto Neutrophil # : x  Auto Lymphocyte # : x  Auto Monocyte # : x  Auto Eosinophil # : x  Auto Basophil # : x  Auto Neutrophil % : x  Auto Lymphocyte % : x  Auto Monocyte % : x  Auto Eosinophil % : x  Auto Basophil % : x    09-20    141  |  108  |  29<H>  ----------------------------<  104<H>  3.7   |  21<L>  |  0.65    Ca    8.5      20 Sep 2017 03:18  Phos  2.3     09-20  Mg     2.4     09-20            MICROBIOLOGY:    Culture - Blood (09.18.17 @ 08:34)    Specimen Source: .Blood Blood    Culture Results:   No growth to date.    Culture - Body Fluid with Gram Stain (09.17.17 @ 12:42)    Gram Stain:   No organisms seen per oil power field  polymorphonuclear leukocytes seen     per low power field  by cytocentrifuge    Specimen Source: Pleural Fl Other, Pleural Fluid    Culture Results:   No growth to date.    Culture - Blood (09.16.17 @ 15:30)    Specimen Source: .Blood Blood-Venous    Culture Results:   No growth to date.    Culture - Blood (09.16.17 @ 15:30)    Specimen Source: .Blood Blood-Venous    Culture Results:   No growth to date.    Culture - Body Fluid with Gram Stain (09.16.17 @ 00:58)    Gram Stain:   No polymorphonuclear cells seen  No organisms seen  by cytocentrifuge    Specimen Source: .Body Fluid Pleural Fluid    Culture Results:   No growth    Culture - Blood (09.15.17 @ 07:46)    Gram Stain:   Growth in anaerobic bottle: Gram Positive Cocci in Clusters  Growth in aerobic bottle:  Gram Positive Cocci in Clusters  Gram Positive Rods    -  Coagulase negative Staphylococcus: Detec    Specimen Source: .Blood Blood-Peripheral    Organism: Blood Culture PCR    Culture Results:   Growth in aerobic and anaerobic bottles: Coag Negative Staphylococcus  Single set isolate, possible contaminant. Contact  Microbiology if susceptibility testing clinically  indicated.  Growth in aerobic and anaerobic bottles: Corynebacterium sp.  presumptively not jeikeium  ***Blood Panel PCR results on this specimen are available  approximately 3 hours after the Gram stain result.***  Gram stain, PCR, and/or culture results may not always  correspond due to difference in methodologies.  ************************************************************  This PCR assay was performed using [employer].  The following targets are tested for: Enterococcus,  vancomycin resistant enterococci, Listeria monocytogenes,  coagulase negative staphylococci, S. aureus,  methicillin resistant S. aureus, Streptococcus agalactiae  (Group B), S. pneumoniae, S. pyogenes (Group A),  Acinetobacter baumannii, Enterobacter cloacae, E. coli,  Klebsiella oxytoca, K. pneumoniae, Proteus sp.,  Serratia marcescens, Haemophilus influenzae,  Neisseria meningitidis, Pseudomonas aeruginosa, Candida  albicans, C. glabrata, C krusei, C parapsilosis,  C. tropicalis and the KPC resistance gene.    Organism Identification: Blood Culture PCR    Method Type: PCR          v    Rapid RVP Result: Nataliya (09-19 @ 14:22)          RADIOLOGY    CXR:    CT HEAD:    CT CHEST:    CT ABDOMEN:    MRI:    OTHER: MEJIA LATIF 82y [id number]    Patient is a 82y old  Male who presents with a chief complaint of feeding tube clogged s/p maryjane logan (15 Sep 2017 16:33)      Follow Up/CC:  anastomotic leak    Interval History/ROS: in sicu, feels ok    Allergies    penicillin (Rash)    Intolerances        ANTIMICROBIALS:  cefepime  IVPB 1000 every 12 hours  metroNIDAZOLE  IVPB 500 every 8 hours  vancomycin    Solution 125 every 6 hours  vancomycin  IVPB 750 every 12 hours      MEDICATIONS  (STANDING):  influenza   Vaccine 0.5 milliLiter(s) IntraMuscular once  cefepime  IVPB 1000 milliGRAM(s) IV Intermittent every 12 hours  metroNIDAZOLE  IVPB 500 milliGRAM(s) IV Intermittent every 8 hours  cyanocobalamin Injectable 500 MICROGram(s) SubCutaneous daily  pyridoxine Injectable 100 milliGRAM(s) IV Push daily  folic acid Injectable 1 milliGRAM(s) IV Push daily  fosphenytoin IVPB 200 milliGRAM(s) PE IV Intermittent every 12 hours  heparin  Infusion 1000 Unit(s)/Hr (14 mL/Hr) IV Continuous <Continuous>  ALBUTerol/ipratropium for Nebulization 3 milliLiter(s) Nebulizer every 4 hours  doxazosin 2 milliGRAM(s) Oral at bedtime  potassium phosphate IVPB 15 milliMole(s) IV Intermittent every 6 hours  vancomycin    Solution 125 milliGRAM(s) Oral every 6 hours  methylPREDNISolone sodium succinate Injectable 20 milliGRAM(s) IV Push every 8 hours  vancomycin  IVPB 750 milliGRAM(s) IV Intermittent every 12 hours  aspirin  chewable 81 milliGRAM(s) Oral daily  clopidogrel Tablet 75 milliGRAM(s) Oral daily  buDESOnide   0.5 milliGRAM(s) Respule 0.5 milliGRAM(s) Inhalation every 12 hours    MEDICATIONS  (PRN):  sodium chloride 0.65% Nasal 1 Spray(s) Both Nostrils three times a day PRN Nasal Congestion        Vital Signs Last 24 Hrs  T(C): 36.6 (20 Sep 2017 07:00), Max: 36.6 (20 Sep 2017 04:15)  T(F): 97.8 (20 Sep 2017 07:00), Max: 97.8 (20 Sep 2017 04:15)  HR: 104 (20 Sep 2017 09:30) (74 - 110)  BP: 93/54 (20 Sep 2017 09:30) (79/49 - 174/74)  BP(mean): 71 (20 Sep 2017 09:30) (59 - 106)  RR: 54 (20 Sep 2017 09:30) (29 - 63)  SpO2: 96% (20 Sep 2017 09:30) (89% - 100%)    CBC Full  -  ( 20 Sep 2017 08:42 )  WBC Count : 14.4 K/uL  Hemoglobin : 7.8 g/dL  Hematocrit : 23.8 %  Platelet Count - Automated : 239 K/uL  Mean Cell Volume : 96.2 fl  Mean Cell Hemoglobin : 31.5 pg  Mean Cell Hemoglobin Concentration : 32.8 gm/dL  Auto Neutrophil # : x  Auto Lymphocyte # : x  Auto Monocyte # : x  Auto Eosinophil # : x  Auto Basophil # : x  Auto Neutrophil % : x  Auto Lymphocyte % : x  Auto Monocyte % : x  Auto Eosinophil % : x  Auto Basophil % : x    09-20    141  |  108  |  29<H>  ----------------------------<  104<H>  3.7   |  21<L>  |  0.65    Ca    8.5      20 Sep 2017 03:18  Phos  2.3     09-20  Mg     2.4     09-20            MICROBIOLOGY:    Culture - Blood (09.18.17 @ 08:34)    Specimen Source: .Blood Blood    Culture Results:   No growth to date.    Culture - Body Fluid with Gram Stain (09.17.17 @ 12:42)    Gram Stain:   No organisms seen per oil power field  polymorphonuclear leukocytes seen     per low power field  by cytocentrifuge    Specimen Source: Pleural Fl Other, Pleural Fluid    Culture Results:   No growth to date.    Culture - Blood (09.16.17 @ 15:30)    Specimen Source: .Blood Blood-Venous    Culture Results:   No growth to date.    Culture - Blood (09.16.17 @ 15:30)    Specimen Source: .Blood Blood-Venous    Culture Results:   No growth to date.    Culture - Body Fluid with Gram Stain (09.16.17 @ 00:58)    Gram Stain:   No polymorphonuclear cells seen  No organisms seen  by cytocentrifuge    Specimen Source: .Body Fluid Pleural Fluid    Culture Results:   No growth    Culture - Blood (09.15.17 @ 07:46)    Gram Stain:   Growth in anaerobic bottle: Gram Positive Cocci in Clusters  Growth in aerobic bottle:  Gram Positive Cocci in Clusters  Gram Positive Rods    -  Coagulase negative Staphylococcus: Detec    Specimen Source: .Blood Blood-Peripheral    Organism: Blood Culture PCR    Culture Results:   Growth in aerobic and anaerobic bottles: Coag Negative Staphylococcus  Single set isolate, possible contaminant. Contact  Microbiology if susceptibility testing clinically  indicated.  Growth in aerobic and anaerobic bottles: Corynebacterium sp.  presumptively not jeikeium  ***Blood Panel PCR results on this specimen are available  approximately 3 hours after the Gram stain result.***  Gram stain, PCR, and/or culture results may not always  correspond due to difference in methodologies.  ************************************************************  This PCR assay was performed using [employer].  The following targets are tested for: Enterococcus,  vancomycin resistant enterococci, Listeria monocytogenes,  coagulase negative staphylococci, S. aureus,  methicillin resistant S. aureus, Streptococcus agalactiae  (Group B), S. pneumoniae, S. pyogenes (Group A),  Acinetobacter baumannii, Enterobacter cloacae, E. coli,  Klebsiella oxytoca, K. pneumoniae, Proteus sp.,  Serratia marcescens, Haemophilus influenzae,  Neisseria meningitidis, Pseudomonas aeruginosa, Candida  albicans, C. glabrata, C krusei, C parapsilosis,  C. tropicalis and the KPC resistance gene.    Organism Identification: Blood Culture PCR    Method Type: PCR          v    Rapid RVP Result: Nataliya (09-19 @ 14:22)          RADIOLOGY    CXR:    CT HEAD:    CT CHEST:    CT ABDOMEN:    MRI:    OTHER:

## 2017-09-20 NOTE — PROGRESS NOTE ADULT - PROVIDER SPECIALTY LIST ADULT
Anesthesia
CT Surgery
CT Surgery
Cardiology
Cardiology
Gastroenterology
Infectious Disease
Infectious Disease
Internal Medicine
Pulmonology
SICU
Surgery
Thoracic Surgery
Surgery
Internal Medicine
Infectious Disease
Pulmonology

## 2017-09-20 NOTE — PROGRESS NOTE ADULT - ASSESSMENT
patient with persistent leak. d/w dr. reina.  question is whether endosopic eval needed. will speak to advanced team next week.  can clip defect, but if eagle claw needed would need advanced gi team

## 2017-09-20 NOTE — DISCHARGE NOTE ADULT - MEDICATION SUMMARY - MEDICATIONS TO TAKE
I will START or STAY ON the medications listed below when I get home from the hospital:    finasteride 5 mg oral tablet  -- 1 tab(s) by mouth once a day in pm  -- Indication: For BPH    predniSONE 5 mg oral tablet  -- 1 tab(s) by mouth once a day in am  -- Indication: For Steroid    olmesartan 20 mg oral tablet  -- 1 tab(s) by mouth once a day in pm  -- Indication: For High blood pressure    tamsulosin 0.4 mg oral capsule  -- 1 cap(s) by mouth once a day in pm  -- Indication: For BPH    Dilantin 100 mg oral capsule  -- 2 cap(s) by mouth 2 times a day  -- Indication: For Heart arryhthmia     Zetia 10 mg oral tablet  -- 1 tab(s) by mouth once a day in pm  -- Indication: For Cholesterol    simvastatin 40 mg oral tablet  -- 1 tab(s) by mouth once a day in pm  -- Indication: For Cholesterol    aspirin-dipyridamole 25 mg-200 mg oral capsule, extended release  -- 1 cap(s) by mouth 2 times a day  -- Indication: For antiplatelet    ALPRAZolam 0.25 mg oral tablet  -- 2 tab(s) by mouth 2 times a day  -- Indication: For anxiety    ProAir HFA 90 mcg/inh inhalation aerosol  -- 2 puff(s) inhaled 4 times a day, As Needed   -- Indication: For respiratory agent    amLODIPine 5 mg oral tablet  -- 1 tab(s) by mouth once a day in pm  -- Indication: For High blood pressure    docusate sodium 100 mg oral capsule  -- 1 cap(s) by mouth 2 times a day  -- Indication: For COnstipation    montelukast 10 mg oral tablet  -- 1 tab(s) by mouth once a day in pm  -- Indication: For respiration    Vitamin B-12 500 mcg oral tablet  -- 1 tab(s) by mouth once a day  -- Indication: For vitamin    folic acid 0.4 mg oral tablet  -- 1 tab(s) by mouth once a day  -- Indication: For vitamin    Vitamin B6 100 mg oral tablet  -- 1 tab(s) by mouth once a day  -- Indication: For vitamin

## 2017-09-20 NOTE — PROGRESS NOTE ADULT - RS GEN PE MLT RESP DETAILS PC
diminished breath sounds, R
diminished breath sounds, L/diminished breath sounds, R/respirations non-labored

## 2017-09-20 NOTE — PROGRESS NOTE ADULT - SUBJECTIVE AND OBJECTIVE BOX
VITAL SIGNS    Telemetry:    Vital Signs Last 24 Hrs  T(C): 36.6 (09-20-17 @ 11:00), Max: 36.6 (09-20-17 @ 04:15)  T(F): 97.8 (09-20-17 @ 11:00), Max: 97.8 (09-20-17 @ 04:15)  HR: 84 (09-20-17 @ 17:43) (74 - 114)  BP: 102/56 (09-20-17 @ 17:30) (82/51 - 174/74)  RR: 25 (09-20-17 @ 17:30) (16 - 63)  SpO2: 96% (09-20-17 @ 17:43) (89% - 100%)            09-19 @ 07:01  -  09-20 @ 07:00  --------------------------------------------------------  IN: 2170.7 mL / OUT: 1365 mL / NET: 805.7 mL    09-20 @ 07:01  -  09-20 @ 17:50  --------------------------------------------------------  IN: 2062.7 mL / OUT: 415 mL / NET: 1647.7 mL       Daily     Daily   Admit Wt: Drug Dosing Weight  Height (cm): 160.02 (15 Sep 2017 16:00)  Weight (kg): 63.8 (15 Sep 2017 16:00)  BMI (kg/m2): 24.9 (15 Sep 2017 16:00)  BSA (m2): 1.67 (15 Sep 2017 16:00)      CAPILLARY BLOOD GLUCOSE              MEDICATIONS  influenza   Vaccine 0.5 milliLiter(s) IntraMuscular once  cefepime  IVPB 1000 milliGRAM(s) IV Intermittent every 12 hours  metroNIDAZOLE  IVPB 500 milliGRAM(s) IV Intermittent every 8 hours  cyanocobalamin Injectable 500 MICROGram(s) SubCutaneous daily  pyridoxine Injectable 100 milliGRAM(s) IV Push daily  folic acid Injectable 1 milliGRAM(s) IV Push daily  fosphenytoin IVPB 200 milliGRAM(s) PE IV Intermittent every 12 hours  sodium chloride 0.65% Nasal 1 Spray(s) Both Nostrils three times a day PRN  ALBUTerol/ipratropium for Nebulization 3 milliLiter(s) Nebulizer every 4 hours  doxazosin 2 milliGRAM(s) Oral at bedtime  vancomycin  IVPB 750 milliGRAM(s) IV Intermittent every 12 hours  aspirin  chewable 81 milliGRAM(s) Oral daily  clopidogrel Tablet 75 milliGRAM(s) Oral daily  buDESOnide   0.5 milliGRAM(s) Respule 0.5 milliGRAM(s) Inhalation every 12 hours  phenylephrine    Infusion 0.3 MICROgram(s)/kG/Min IV Continuous <Continuous>  fentaNYL   Infusion 1 MICROgram(s)/kG/Hr IV Continuous <Continuous>  chlorhexidine 0.12% Liquid 15 milliLiter(s) Swish and Spit every 8 hours  pantoprazole  Injectable 40 milliGRAM(s) IV Push every 24 hours  multiple electrolytes Injection Type 1 1000 milliLiter(s) IV Continuous <Continuous>        PHYSICAL EXAM    intubated and sedated   Rt bulb sutured CDI site no output  Left pleural chest tube to clws output 80ml in 12h     09-20    141  |  108  |  29<H>  ----------------------------<  104<H>  3.7   |  21<L>  |  0.65    Ca    8.5      20 Sep 2017 03:18  Phos  2.3     09-20  Mg     2.4     09-20                                   7.9    18.1  )-----------( 262      ( 20 Sep 2017 14:02 )             22.5          PT/INR - ( 20 Sep 2017 03:18 )   PT: 18.5 sec;   INR: 1.68 ratio         PTT - ( 20 Sep 2017 08:42 )  PTT:55.7 sec         PAST MEDICAL & SURGICAL HISTORY:  Coronary artery disease: s/p 3 stents on June 30, 2017 at [facility] Dr. Lavelle Reid  OA (osteoarthritis) of knee: right  Former smoker, stopped smoking in distant past  Rheumatoid arthritis  Seizure disorder: 1 episode approximately 15 yrs ago  Asthma  Hyperlipidemia  BPH (benign prostatic hyperplasia)  HTN (hypertension)  Esophagus cancer  Chronic allergic rhinitis  BCC (basal cell carcinoma): skin  Cerebrovascular accident (CVA)  Anxiety  History of tonsillectomy  H/O heart artery stent: June 30, 2017  Knee arthropathy: left  History of total hip replacement: left  History of hernia repair         < from: Xray Esophagram (09.19.17 @ 12:08) >  Preliminary  radiograph of the partially imaged chest demonstrates   multiple catheters as seen on recent CT chest.     The patient was given oral contrast to drink any swallowed without   difficulty. Contrast is seen opacifying the esophagus and gastric   pull-through above the hemidiaphragm. Contrast subsequently passed into   the proximal small bowel below the hemidiaphragm. The region posterior to   the gastric pull-through just above the right hemidiaphragm shows   contrast pooling with extravasation into the pleural space on the right   on delayed imaging. This is consistent with a leak.      IMPRESSION:     Status post [place]. Findings as described above consistent with a   leak at the distal site just above the hemidiaphragm into the pleural   space.       < end of copied text >

## 2017-09-20 NOTE — PROGRESS NOTE ADULT - ASSESSMENT
82yMale with 2y M, s/p robot assisted [place] 8/14, admitted from rehab with anastomotic, respiratory distress 2/2 pleural effusions s/p L pigtail placement. Persistent leak noted on repeat esophagram 9/19. Intubated this am. r/o C.Diff    -NPO/TF  -f/u C.Diff  -wean pressors  -f/u cards   -cont hep gtt  -abx per ID  -monitor diarrhea  -GI PPX  -appreciate SICU care    *[phone]

## 2017-09-20 NOTE — PROGRESS NOTE ADULT - PROBLEM SELECTOR PLAN 2
s/p Left CT = transudative pleural effusion
-per SICU, thracic  -cx -ve so far  -cont empiric abx for now
s/p Left CT = exudate pleural effusion  consider send cytology  amylase = 32 from Left chest tube
s/p Left CT = exudate pleural effusion  consider send cytology  await amylase
s/p Left CT = transudative pleural effusion
s/p drainage  follow up microbiology and cultures
-per SICU, thracic  -f/u cx  -cont abx for now pending final cx results

## 2017-09-20 NOTE — DISCHARGE NOTE ADULT - CARE PROVIDER_API CALL
Ganesh Cohen), Surgery; Surgical Oncology  [address] [place] [address]  Phone: [phone]  Fax: [phone]

## 2017-09-20 NOTE — PROGRESS NOTE ADULT - SUBJECTIVE AND OBJECTIVE BOX
SUBJ: patient agitated overnight, increased respiratort distress, + diarrhea overnight    MEDICATIONS  (STANDING):  influenza   Vaccine 0.5 milliLiter(s) IntraMuscular once  cefepime  IVPB 1000 milliGRAM(s) IV Intermittent every 12 hours  metroNIDAZOLE  IVPB 500 milliGRAM(s) IV Intermittent every 8 hours  cyanocobalamin Injectable 500 MICROGram(s) SubCutaneous daily  pyridoxine Injectable 100 milliGRAM(s) IV Push daily  folic acid Injectable 1 milliGRAM(s) IV Push daily  fosphenytoin IVPB 200 milliGRAM(s) PE IV Intermittent every 12 hours  heparin  Infusion 1000 Unit(s)/Hr (14 mL/Hr) IV Continuous <Continuous>  ALBUTerol/ipratropium for Nebulization 3 milliLiter(s) Nebulizer every 4 hours  doxazosin 2 milliGRAM(s) Oral at bedtime  vancomycin    Solution 125 milliGRAM(s) Oral every 6 hours  methylPREDNISolone sodium succinate Injectable 20 milliGRAM(s) IV Push every 8 hours  vancomycin  IVPB 750 milliGRAM(s) IV Intermittent every 12 hours  aspirin  chewable 81 milliGRAM(s) Oral daily  clopidogrel Tablet 75 milliGRAM(s) Oral daily  buDESOnide   0.5 milliGRAM(s) Respule 0.5 milliGRAM(s) Inhalation every 12 hours  phenylephrine    Infusion 0.3 MICROgram(s)/kG/Min (7.177 mL/Hr) IV Continuous <Continuous>  fentaNYL   Infusion 1 MICROgram(s)/kG/Hr (3.19 mL/Hr) IV Continuous <Continuous>  chlorhexidine 0.12% Liquid 15 milliLiter(s) Swish and Spit every 8 hours  pantoprazole  Injectable 40 milliGRAM(s) IV Push every 24 hours    MEDICATIONS  (PRN):  sodium chloride 0.65% Nasal 1 Spray(s) Both Nostrils three times a day PRN Nasal Congestion    REVIEW OF SYSTEMS obtained from patient, RN and resident  CONSTITUTIONAL: No fever,   RESPIRATORY: + wheeze, + tachypnea, no cough  CARDIOVASCULAR: No chest pain, palpitations,  GASTROINTESTINAL: No abdominal or epigastric pain. No nausea, vomiting, or hematemesis; + diarrhea , No melena or hematochezia.  SKIN: No rashes  PSYCHIATRIC: + agitation and confusion    Vital Signs Last 24 Hrs  T(C): 36.6 (20 Sep 2017 11:00), Max: 36.6 (20 Sep 2017 04:15)  T(F): 97.8 (20 Sep 2017 11:00), Max: 97.8 (20 Sep 2017 04:15)  HR: 100 (20 Sep 2017 12:00) (74 - 114)  BP: 126/54 (20 Sep 2017 12:00) (82/51 - 174/74)  BP(mean): 77 (20 Sep 2017 12:00) (60 - 111)  RR: 34 (20 Sep 2017 12:00) (16 - 63)  SpO2: 100% (20 Sep 2017 12:00) (89% - 100%)      PHYSICAL EXAM:  · CONSTITUTIONAL: Well-developed, well nourished      · EYES: EOMI; PERRL; no drainage or redness  · NECK: No bruits; no thyromegaly or nodules  ·RESPIRATORY:   airway patent; breath sounds equal; good air movement; respirations non-labored; clear to auscultation bilaterally; no chest wall tenderness; no intercostal retractions; no rales,rhonchi or wheeze  · CARDIOVASCULAR: regular rate and rhythm  no rub  no murmur  normal PMI  . GASTROINTESTINAL:  no distention; no masses palpable; bowel sounds normal  · EXTREMITIES: No cyanosis, clubbing or edema  · VASCULAR:  Equal and normal pulses (radial, femoral)  ·NEUROLOGICAL:  Alert and oriented x 3; sensation intact; deep reflexes intact; cranial nerves intact; no spontaneous movement; superficial reflexes intact; normal strength  · SKIN: No lesions; no rash  . LYMPH NODES: No lymphadedenopathy  · MUSCULOSKELETAL:  No calf tenderness  no joint swelling	    TELEMETRY:    ECG:    TTE:    LABS:   CBC Full  -  ( 20 Sep 2017 08:42 )  WBC Count : 14.4 K/uL  Hemoglobin : 7.8 g/dL  Hematocrit : 23.8 %  Platelet Count - Automated : 239 K/uL  Mean Cell Volume : 96.2 fl  Mean Cell Hemoglobin : 31.5 pg  Mean Cell Hemoglobin Concentration : 32.8 gm/dL  Auto Neutrophil # : x  Auto Lymphocyte # : x  Auto Monocyte # : x  Auto Eosinophil # : x  Auto Basophil # : x  Auto Neutrophil % : x  Auto Lymphocyte % : x  Auto Monocyte % : x  Auto Eosinophil % : x  Auto Basophil % : x    09-20    141  |  108  |  29<H>  ----------------------------<  104<H>  3.7   |  21<L>  |  0.65    Ca    8.5      20 Sep 2017 03:18  Phos  2.3     09-20  Mg     2.4     09-20      CARDIAC MARKERS ( 20 Sep 2017 08:42 )  x     / 0.54 ng/mL / 126 U/L / x     / 8.3 ng/mL  CARDIAC MARKERS ( 20 Sep 2017 00:07 )  x     / 0.50 ng/mL / 133 U/L / x     / 8.0 ng/mL  CARDIAC MARKERS ( 19 Sep 2017 17:37 )  x     / 0.54 ng/mL / 105 U/L / x     / 7.3 ng/mL  CARDIAC MARKERS ( 19 Sep 2017 09:24 )  x     / 0.54 ng/mL / 122 U/L / x     / 7.7 ng/mL  CARDIAC MARKERS ( 19 Sep 2017 01:01 )  x     / 0.54 ng/mL / 100 U/L / x     / 8.1 ng/mL  CARDIAC MARKERS ( 18 Sep 2017 18:38 )  x     / 0.54 ng/mL / 39 U/L / x     / 5.6 ng/mL  CARDIAC MARKERS ( 18 Sep 2017 12:54 )  x     / 0.42 ng/mL / 39 U/L / x     / 5.5 ng/mL        RADIOLOGY & ADDITIONAL STUDIES:    IMPRESSION AND PLAN:        Jonna Coyle MD, MPH, ALLY  Cardiology Attending  [facility]  C: ***  E: [email or web address] SUBJ: patient agitated overnight, increased respiratort distress, + diarrhea overnight    MEDICATIONS  (STANDING):  influenza   Vaccine 0.5 milliLiter(s) IntraMuscular once  cefepime  IVPB 1000 milliGRAM(s) IV Intermittent every 12 hours  metroNIDAZOLE  IVPB 500 milliGRAM(s) IV Intermittent every 8 hours  cyanocobalamin Injectable 500 MICROGram(s) SubCutaneous daily  pyridoxine Injectable 100 milliGRAM(s) IV Push daily  folic acid Injectable 1 milliGRAM(s) IV Push daily  fosphenytoin IVPB 200 milliGRAM(s) PE IV Intermittent every 12 hours  heparin  Infusion 1000 Unit(s)/Hr (14 mL/Hr) IV Continuous <Continuous>  ALBUTerol/ipratropium for Nebulization 3 milliLiter(s) Nebulizer every 4 hours  doxazosin 2 milliGRAM(s) Oral at bedtime  vancomycin    Solution 125 milliGRAM(s) Oral every 6 hours  methylPREDNISolone sodium succinate Injectable 20 milliGRAM(s) IV Push every 8 hours  vancomycin  IVPB 750 milliGRAM(s) IV Intermittent every 12 hours  aspirin  chewable 81 milliGRAM(s) Oral daily  clopidogrel Tablet 75 milliGRAM(s) Oral daily  buDESOnide   0.5 milliGRAM(s) Respule 0.5 milliGRAM(s) Inhalation every 12 hours  phenylephrine    Infusion 0.3 MICROgram(s)/kG/Min (7.177 mL/Hr) IV Continuous <Continuous>  fentaNYL   Infusion 1 MICROgram(s)/kG/Hr (3.19 mL/Hr) IV Continuous <Continuous>  chlorhexidine 0.12% Liquid 15 milliLiter(s) Swish and Spit every 8 hours  pantoprazole  Injectable 40 milliGRAM(s) IV Push every 24 hours    MEDICATIONS  (PRN):  sodium chloride 0.65% Nasal 1 Spray(s) Both Nostrils three times a day PRN Nasal Congestion    REVIEW OF SYSTEMS obtained from patient, RN and resident  CONSTITUTIONAL: No fever,   RESPIRATORY: + wheeze, + tachypnea, no cough  CARDIOVASCULAR: No chest pain, palpitations,  GASTROINTESTINAL: No abdominal or epigastric pain. No nausea, vomiting, or hematemesis; + diarrhea , No melena or hematochezia.  SKIN: No rashes  PSYCHIATRIC: + agitation and confusion    Vital Signs Last 24 Hrs  T(C): 36.6 (20 Sep 2017 11:00), Max: 36.6 (20 Sep 2017 04:15)  T(F): 97.8 (20 Sep 2017 11:00), Max: 97.8 (20 Sep 2017 04:15)  HR: 100 (20 Sep 2017 12:00) (74 - 114)  BP: 126/54 (20 Sep 2017 12:00) (82/51 - 174/74)  BP(mean): 77 (20 Sep 2017 12:00) (60 - 111)  RR: 34 (20 Sep 2017 12:00) (16 - 63)  SpO2: 100% (20 Sep 2017 12:00) (89% - 100%)      PHYSICAL EXAM exam prior to intubation:  · CONSTITUTIONAL: thin M sitting in bed, + tachypnea on NC, restless  · EYES: no drainage or redness  · NECK: No bruits; no JVD  ·RESPIRATORY:   + tachypnea, lould exp wheeze, + labored respirations, no chest wall tenderness; bl chest tubes in place  · CARDIOVASCULAR: regular, tachycardic, no RV heave  . GASTROINTESTINAL:  no distention; no masses palpable; + feeding tube  · EXTREMITIES: No cyanosis, clubbing or edema  · VASCULAR:  Equal and normal pulses (radial, femoral)  ·NEUROLOGICAL:  awake alert, + confusion  · SKIN:  no rash    TELEMETRY: sinus tachycardia with PVCs    TTE:  CONCLUSIONS:  1. Mitral annular calcification and calcified mitral  leaflets with normal diastolic opening.  2. Calcified aortic valve with decreased opening.  3. Low Normal left ventricular systolic function. EF 50%  4. The right ventricle is not well visualized; grossly  normal right ventricular systolic function.  5. Thickened  pericardium with small  0.9cm circumferential  pericardial effusion.There is evidence of raised intra  pericardial pressure with RA buckling however no  tamponade  seen.    LABS:   CBC Full  -  ( 20 Sep 2017 08:42 )  WBC Count : 14.4 K/uL  Hemoglobin : 7.8 g/dL  Hematocrit : 23.8 %  Platelet Count - Automated : 239 K/uL  Mean Cell Volume : 96.2 fl  Mean Cell Hemoglobin : 31.5 pg  Mean Cell Hemoglobin Concentration : 32.8 gm/dL  Auto Neutrophil # : x  Auto Lymphocyte # : x  Auto Monocyte # : x  Auto Eosinophil # : x  Auto Basophil # : x  Auto Neutrophil % : x  Auto Lymphocyte % : x  Auto Monocyte % : x  Auto Eosinophil % : x  Auto Basophil % : x    09-20    141  |  108  |  29<H>  ----------------------------<  104<H>  3.7   |  21<L>  |  0.65    Ca    8.5      20 Sep 2017 03:18  Phos  2.3     09-20  Mg     2.4     09-20      CARDIAC MARKERS ( 20 Sep 2017 08:42 )  x     / 0.54 ng/mL / 126 U/L / x     / 8.3 ng/mL  CARDIAC MARKERS ( 20 Sep 2017 00:07 )  x     / 0.50 ng/mL / 133 U/L / x     / 8.0 ng/mL  CARDIAC MARKERS ( 19 Sep 2017 17:37 )  x     / 0.54 ng/mL / 105 U/L / x     / 7.3 ng/mL  CARDIAC MARKERS ( 19 Sep 2017 09:24 )  x     / 0.54 ng/mL / 122 U/L / x     / 7.7 ng/mL  CARDIAC MARKERS ( 19 Sep 2017 01:01 )  x     / 0.54 ng/mL / 100 U/L / x     / 8.1 ng/mL  CARDIAC MARKERS ( 18 Sep 2017 18:38 )  x     / 0.54 ng/mL / 39 U/L / x     / 5.6 ng/mL  CARDIAC MARKERS ( 18 Sep 2017 12:54 )  x     / 0.42 ng/mL / 39 U/L / x     / 5.5 ng/mL    IMPRESSION AND PLAN:  82 M pmhx CAD, bare metal stents to LAD placed in June 2017 and balloon angioplasty to D2, hyperlipidemia, BPH, HTN, asthma, CVA, anxiety s/p esophagogastrectomy last month, recently had left pleural effusion drained via pigtail catheter, and found to have NSTEMI in the setting of sepsis likly from his surgical leak, possible cdiff.  overnight was agitated, confused, tachypnic this am.  Patient denies chest pain, continued tachycardia on telemetry troponin roughly stable in past 24hrs, Hb rending down. sepsis continued to require pressors. Continue medical management for NSTEMI    1) NSTEMI  ·	Continue medical management with  aspirin 81 mg daily, clopidogrel 75 mg daily.  ·	-discontinue heparin drip as Hb drop, Transfuse to HB 10 and monitor  ·	due to pressor requirement, continue to hold all antihypertensives and beta blockers    ·	Continue treating SIRS/sepsis with antibiotics as per ID, resuscitation, pressors, etc.   ·	Will continue to hold off on cardiac cath at this time as the NSTEMI is likely due to supply demand mismatch. plan for furhter cardiac testing following resolution of sepsis.        Aj Hubbard MD, PhD  Cardiology Attending  [facility]  C: [phone]

## 2017-09-20 NOTE — PROGRESS NOTE ADULT - ATTENDING COMMENTS
Juancarlos Donnelly  Attending Physician   Division of Infectious Disease  Pager #[phone]  After 5pm/weekend #[phone]

## 2017-09-20 NOTE — H&P ADULT - NSHPPHYSICALEXAM_GEN_ALL_CORE
General:  WD WN elderly male on sedation, intubated  N: Supple, No JVD  H: NC AT  E: pupils equal reactive  E: No DC  N: No DC  T: +ETT + moist MM  C; Equal decreased B/L excursion, + Pig tial on L, + CT on R  L: Good B/L air entery, transmitted BS, decresad at bases, + rhonchi, No wheezing + crackles at bases  Cor: RRR, NL S1, prolonged S2, soft earlt   Abb: sligthly obese, + hypoactive BS, no tenderness (pt is heavily sedated), no organomegaly, softM Base->apex  ext; passive ROM, no obvious fractures  Neuro: Heavily sedated, pupils small but equally reactive  Restal: Deferred

## 2017-09-20 NOTE — H&P ADULT - FAMILY HISTORY
FH: CAD (coronary artery disease)     Family history of pulmonary embolism, father @ 49 yr old     Sibling  Still living? No  Family history of heart attack, Age at diagnosis: Age Unknown

## 2017-09-20 NOTE — PROGRESS NOTE ADULT - SUBJECTIVE AND OBJECTIVE BOX
MEJIA MCCORDBA:[id number],   82yMale followed for:  penicillin (Rash)    PAST MEDICAL & SURGICAL HISTORY:  Coronary artery disease: s/p 3 stents on June 30, 2017 at [place] Dr. Lavelle Reid  OA (osteoarthritis) of knee: right  Former smoker, stopped smoking in distant past  Rheumatoid arthritis  Seizure disorder: 1 episode approximately 15 yrs ago  Asthma  Hyperlipidemia  BPH (benign prostatic hyperplasia)  HTN (hypertension)  Esophagus cancer  Chronic allergic rhinitis  BCC (basal cell carcinoma): skin  Cerebrovascular accident (CVA)  Anxiety  History of tonsillectomy  H/O heart artery stent: June 30, 2017  Knee arthropathy: left  History of total hip replacement: left  History of hernia repair    FAMILY HISTORY:  Family history of heart attack (Sibling)  Family history of pulmonary embolism: father @ 49 yr old  FH: CAD (coronary artery disease)    MEDICATIONS  (STANDING):  influenza   Vaccine 0.5 milliLiter(s) IntraMuscular once  cefepime  IVPB 1000 milliGRAM(s) IV Intermittent every 12 hours  metroNIDAZOLE  IVPB 500 milliGRAM(s) IV Intermittent every 8 hours  cyanocobalamin Injectable 500 MICROGram(s) SubCutaneous daily  pyridoxine Injectable 100 milliGRAM(s) IV Push daily  folic acid Injectable 1 milliGRAM(s) IV Push daily  fosphenytoin IVPB 200 milliGRAM(s) PE IV Intermittent every 12 hours  heparin  Infusion 1000 Unit(s)/Hr (14 mL/Hr) IV Continuous <Continuous>  ALBUTerol/ipratropium for Nebulization 3 milliLiter(s) Nebulizer every 4 hours  doxazosin 2 milliGRAM(s) Oral at bedtime  potassium phosphate IVPB 15 milliMole(s) IV Intermittent every 6 hours  vancomycin    Solution 125 milliGRAM(s) Oral every 6 hours  methylPREDNISolone sodium succinate Injectable 20 milliGRAM(s) IV Push every 8 hours  vancomycin  IVPB 750 milliGRAM(s) IV Intermittent every 12 hours  aspirin  chewable 81 milliGRAM(s) Oral daily  clopidogrel Tablet 75 milliGRAM(s) Oral daily  buDESOnide   0.5 milliGRAM(s) Respule 0.5 milliGRAM(s) Inhalation every 12 hours  phenylephrine    Infusion 0.3 MICROgram(s)/kG/Min (7.177 mL/Hr) IV Continuous <Continuous>  fentaNYL    Injectable 50 MICROGram(s) IV Push once  fentaNYL   Infusion 1 MICROgram(s)/kG/Hr (3.19 mL/Hr) IV Continuous <Continuous>  midazolam Injectable 1 milliGRAM(s) IV Push once  chlorhexidine 0.12% Liquid 15 milliLiter(s) Swish and Spit every 8 hours  pantoprazole  Injectable 40 milliGRAM(s) IV Push every 24 hours    MEDICATIONS  (PRN):  sodium chloride 0.65% Nasal 1 Spray(s) Both Nostrils three times a day PRN Nasal Congestion      Vital Signs Last 24 Hrs  T(C): 36.6 (20 Sep 2017 07:00), Max: 36.6 (20 Sep 2017 04:15)  T(F): 97.8 (20 Sep 2017 07:00), Max: 97.8 (20 Sep 2017 04:15)  HR: 100 (20 Sep 2017 11:30) (74 - 114)  BP: 94/53 (20 Sep 2017 11:30) (82/51 - 174/74)  BP(mean): 69 (20 Sep 2017 11:30) (60 - 111)  RR: 34 (20 Sep 2017 11:30) (16 - 63)  SpO2: 98% (20 Sep 2017 11:30) (89% - 100%)  nc/at  s1s2  cta  soft, nt, nd no guarding or rebound  no c/c/e    CBC Full  -  ( 20 Sep 2017 08:42 )  WBC Count : 14.4 K/uL  Hemoglobin : 7.8 g/dL  Hematocrit : 23.8 %  Platelet Count - Automated : 239 K/uL  Mean Cell Volume : 96.2 fl  Mean Cell Hemoglobin : 31.5 pg  Mean Cell Hemoglobin Concentration : 32.8 gm/dL  Auto Neutrophil # : x  Auto Lymphocyte # : x  Auto Monocyte # : x  Auto Eosinophil # : x  Auto Basophil # : x  Auto Neutrophil % : x  Auto Lymphocyte % : x  Auto Monocyte % : x  Auto Eosinophil % : x  Auto Basophil % : x    09-20    141  |  108  |  29<H>  ----------------------------<  104<H>  3.7   |  21<L>  |  0.65    Ca    8.5      20 Sep 2017 03:18  Phos  2.3     09-20  Mg     2.4     09-20      PT/INR - ( 20 Sep 2017 03:18 )   PT: 18.5 sec;   INR: 1.68 ratio         PTT - ( 20 Sep 2017 08:42 )  PTT:55.7 sec

## 2017-09-20 NOTE — H&P ADULT - PMH
Anxiety    Asthma    BCC (basal cell carcinoma)  skin  BPH (benign prostatic hyperplasia)    Cerebrovascular accident (CVA)    Chronic allergic rhinitis    Coronary artery disease  s/p 3 stents on June 30, 2017 at [place] [facility] Dr. Lavelle Reid  Esophagus cancer    Former smoker, stopped smoking in distant past    HTN (hypertension)    Hyperlipidemia    OA (osteoarthritis) of knee  right  Rheumatoid arthritis    Seizure disorder  1 episode approximately 15 yrs ago

## 2017-09-20 NOTE — PROGRESS NOTE ADULT - ASSESSMENT
82M w/ respiratory distress secondary to worsened left pleural effusion, s/p [place], complicated by anastomotic l  H/OCAD, STENT 7/ 17    LEFT CHEST TUBE  AND  RIGHT , PLEUREX   PT   WITH  MARGINAL TROPONIN, F/P  BY CARD   H/O  CAD/ STENTS  ON ASA/  PLAVIX/  LOPRESSOR, IV HEPARIN  ON IV CEFEPIME/ VANCO/ FLAGYL  ESOPHAGRAM DEFERRED   AT PRESENT    CATH DEFERRED   FOLLOW  HB

## 2017-09-20 NOTE — PROGRESS NOTE ADULT - SUBJECTIVE AND OBJECTIVE BOX
Follow-up Pulm Progress Note    + respiratory distress, coughing and wheexing. O2 sat 95% on 3lnc  + leak with esophagram  to Right pleural space    Medications:  MEDICATIONS  (STANDING):  influenza   Vaccine 0.5 milliLiter(s) IntraMuscular once  cefepime  IVPB 1000 milliGRAM(s) IV Intermittent every 12 hours  metroNIDAZOLE  IVPB 500 milliGRAM(s) IV Intermittent every 8 hours  cyanocobalamin Injectable 500 MICROGram(s) SubCutaneous daily  pyridoxine Injectable 100 milliGRAM(s) IV Push daily  folic acid Injectable 1 milliGRAM(s) IV Push daily  fosphenytoin IVPB 200 milliGRAM(s) PE IV Intermittent every 12 hours  heparin  Infusion 1000 Unit(s)/Hr (14 mL/Hr) IV Continuous <Continuous>  ALBUTerol/ipratropium for Nebulization 3 milliLiter(s) Nebulizer every 4 hours  doxazosin 2 milliGRAM(s) Oral at bedtime  potassium phosphate IVPB 15 milliMole(s) IV Intermittent every 6 hours  vancomycin    Solution 125 milliGRAM(s) Oral every 6 hours  methylPREDNISolone sodium succinate Injectable 20 milliGRAM(s) IV Push every 8 hours  vancomycin  IVPB 750 milliGRAM(s) IV Intermittent every 12 hours  aspirin  chewable 81 milliGRAM(s) Oral daily  clopidogrel Tablet 75 milliGRAM(s) Oral daily    MEDICATIONS  (PRN):  sodium chloride 0.65% Nasal 1 Spray(s) Both Nostrils three times a day PRN Nasal Congestion          Vital Signs Last 24 Hrs  T(C): 36.6 (20 Sep 2017 07:00), Max: 36.6 (20 Sep 2017 04:15)  T(F): 97.8 (20 Sep 2017 07:00), Max: 97.8 (20 Sep 2017 04:15)  HR: 106 (20 Sep 2017 09:00) (74 - 110)  BP: 101/50 (20 Sep 2017 09:00) (79/49 - 174/74)  BP(mean): 70 (20 Sep 2017 09:00) (59 - 106)  RR: 49 (20 Sep 2017 09:00) (27 - 63)  SpO2: 97% (20 Sep 2017 09:00) (89% - 100%)    ABG - ( 20 Sep 2017 00:07 )  pH: 7.40  /  pCO2: 39    /  pO2: 91    / HCO3: 24    / Base Excess: .0    /  SaO2: 98                    09-19 @ 07:01  -  09-20 @ 07:00  --------------------------------------------------------  IN: 2170.7 mL / OUT: 1365 mL / NET: 805.7 mL          LABS:                        7.8    14.4  )-----------( 239      ( 20 Sep 2017 08:42 )             23.8     09-20    141  |  108  |  29<H>  ----------------------------<  104<H>  3.7   |  21<L>  |  0.65    Ca    8.5      20 Sep 2017 03:18  Phos  2.3     09-20  Mg     2.4     09-20        CARDIAC MARKERS ( 20 Sep 2017 08:42 )  x     / 0.54 ng/mL / 126 U/L / x     / 8.3 ng/mL  CARDIAC MARKERS ( 20 Sep 2017 00:07 )  x     / 0.50 ng/mL / 133 U/L / x     / 8.0 ng/mL  CARDIAC MARKERS ( 19 Sep 2017 17:37 )  x     / 0.54 ng/mL / 105 U/L / x     / 7.3 ng/mL  CARDIAC MARKERS ( 19 Sep 2017 09:24 )  x     / 0.54 ng/mL / 122 U/L / x     / 7.7 ng/mL  CARDIAC MARKERS ( 19 Sep 2017 01:01 )  x     / 0.54 ng/mL / 100 U/L / x     / 8.1 ng/mL  CARDIAC MARKERS ( 18 Sep 2017 18:38 )  x     / 0.54 ng/mL / 39 U/L / x     / 5.6 ng/mL  CARDIAC MARKERS ( 18 Sep 2017 12:54 )  x     / 0.42 ng/mL / 39 U/L / x     / 5.5 ng/mL      CAPILLARY BLOOD GLUCOSE        PT/INR - ( 20 Sep 2017 03:18 )   PT: 18.5 sec;   INR: 1.68 ratio         PTT - ( 20 Sep 2017 08:42 )  PTT:55.7 sec    Procalcitonin, Serum: 0.24 ng/mL (18 Sep 2017 03:30)    Serum Pro-Brain Natriuretic Peptide: 5356 pg/mL (20 Sep 2017 03:18)  Serum Pro-Brain Natriuretic Peptide: 3503 pg/mL (18 Sep 2017 06:48)    RVP-negative        Fluid characteristics  -- 09-17 @ 08:09 EXUDATE Effusion  pH >8    tprot 1.9    Amylase, Body Fluid (09.19.17 @ 14:23)  Culture - Body Fluid with Gram Stain (09.16.17 @ 00:58)    Gram Stain:   No polymorphonuclear cells seen  No organisms seen  by cytocentrifuge    Specimen Source: .Body Fluid Pleural Fluid    Culture Results:   No growth      Amylase, Body Fluid: 32:   obtained from Left CT            CULTURES: Culture - Blood (09.18.17 @ 08:34)    Specimen Source: .Blood Blood    Culture Results:   No growth to date.  Culture - Body Fluid with Gram Stain (09.17.17 @ 12:42)    Gram Stain:   No organisms seen per oil power field  polymorphonuclear leukocytes seen     per low power field  by cytocentrifuge    Specimen Source: Pleural Fl Other, Pleural Fluid    Culture Results:   No growth to date.    Culture - Blood (09.16.17 @ 15:30)    Specimen Source: .Blood Blood-Venous    Culture Results:   No growth to date.                Physical Examination:  PULM: diffuse wheeze via all lobes, + tracheal wheezing, no significant sputum production  CVS: S1, S2 heard    RADIOLOGY REVIEWED  CXR: < from: Xray Chest 1 View AP -PORTABLE-Routine (09.20.17 @ 07:21) >  Right airspace opacities and reticular opacities not significantly   changed. Left Lung is clear. No pneumothorax. Skinfold overlies the right   upper lung.    < end of copied text >      CT chest:< from: CT Angio Chest w/ IV Cont (09.15.17 @ 00:51) >    IMPRESSION:     No pulmonary embolism.    Status post distal esophagectomy with gastric pull-through.   Evaluation   for persistent leak is limited on this examination without contrast.    Moderate/large left pleural effusion, increased in size since 8/26/2017.   Stable small/moderate loculated right pleural effusion with a right-sided   chest tube in place.    Small pericardial effusion is new since 08/26/2017.    Mild subcutaneous edema.    < end of copied text > Follow-up Pulm Progress Note    + respiratory distress, coughing and wheexing. O2 sat 95% on 3lnc  + leak with esophagram  to Right pleural space    Medications:  MEDICATIONS  (STANDING):  influenza   Vaccine 0.5 milliLiter(s) IntraMuscular once  cefepime  IVPB 1000 milliGRAM(s) IV Intermittent every 12 hours  metroNIDAZOLE  IVPB 500 milliGRAM(s) IV Intermittent every 8 hours  cyanocobalamin Injectable 500 MICROGram(s) SubCutaneous daily  pyridoxine Injectable 100 milliGRAM(s) IV Push daily  folic acid Injectable 1 milliGRAM(s) IV Push daily  fosphenytoin IVPB 200 milliGRAM(s) PE IV Intermittent every 12 hours  heparin  Infusion 1000 Unit(s)/Hr (14 mL/Hr) IV Continuous <Continuous>  ALBUTerol/ipratropium for Nebulization 3 milliLiter(s) Nebulizer every 4 hours  doxazosin 2 milliGRAM(s) Oral at bedtime  potassium phosphate IVPB 15 milliMole(s) IV Intermittent every 6 hours  vancomycin    Solution 125 milliGRAM(s) Oral every 6 hours  methylPREDNISolone sodium succinate Injectable 20 milliGRAM(s) IV Push every 8 hours  vancomycin  IVPB 750 milliGRAM(s) IV Intermittent every 12 hours  aspirin  chewable 81 milliGRAM(s) Oral daily  clopidogrel Tablet 75 milliGRAM(s) Oral daily    MEDICATIONS  (PRN):  sodium chloride 0.65% Nasal 1 Spray(s) Both Nostrils three times a day PRN Nasal Congestion          Vital Signs Last 24 Hrs  T(C): 36.6 (20 Sep 2017 07:00), Max: 36.6 (20 Sep 2017 04:15)  T(F): 97.8 (20 Sep 2017 07:00), Max: 97.8 (20 Sep 2017 04:15)  HR: 106 (20 Sep 2017 09:00) (74 - 110)  BP: 101/50 (20 Sep 2017 09:00) (79/49 - 174/74)  BP(mean): 70 (20 Sep 2017 09:00) (59 - 106)  RR: 49 (20 Sep 2017 09:00) (27 - 63)  SpO2: 97% (20 Sep 2017 09:00) (89% - 100%)    ABG - ( 20 Sep 2017 00:07 )  pH: 7.40  /  pCO2: 39    /  pO2: 91    / HCO3: 24    / Base Excess: .0    /  SaO2: 98                    09-19 @ 07:01  -  09-20 @ 07:00  --------------------------------------------------------  IN: 2170.7 mL / OUT: 1365 mL / NET: 805.7 mL          LABS:                        7.8    14.4  )-----------( 239      ( 20 Sep 2017 08:42 )             23.8     09-20    141  |  108  |  29<H>  ----------------------------<  104<H>  3.7   |  21<L>  |  0.65    Ca    8.5      20 Sep 2017 03:18  Phos  2.3     09-20  Mg     2.4     09-20        CARDIAC MARKERS ( 20 Sep 2017 08:42 )  x     / 0.54 ng/mL / 126 U/L / x     / 8.3 ng/mL  CARDIAC MARKERS ( 20 Sep 2017 00:07 )  x     / 0.50 ng/mL / 133 U/L / x     / 8.0 ng/mL  CARDIAC MARKERS ( 19 Sep 2017 17:37 )  x     / 0.54 ng/mL / 105 U/L / x     / 7.3 ng/mL  CARDIAC MARKERS ( 19 Sep 2017 09:24 )  x     / 0.54 ng/mL / 122 U/L / x     / 7.7 ng/mL  CARDIAC MARKERS ( 19 Sep 2017 01:01 )  x     / 0.54 ng/mL / 100 U/L / x     / 8.1 ng/mL  CARDIAC MARKERS ( 18 Sep 2017 18:38 )  x     / 0.54 ng/mL / 39 U/L / x     / 5.6 ng/mL  CARDIAC MARKERS ( 18 Sep 2017 12:54 )  x     / 0.42 ng/mL / 39 U/L / x     / 5.5 ng/mL      CAPILLARY BLOOD GLUCOSE        PT/INR - ( 20 Sep 2017 03:18 )   PT: 18.5 sec;   INR: 1.68 ratio         PTT - ( 20 Sep 2017 08:42 )  PTT:55.7 sec    Procalcitonin, Serum: 0.24 ng/mL (18 Sep 2017 03:30)    Serum Pro-Brain Natriuretic Peptide: 5356 pg/mL (20 Sep 2017 03:18)  Serum Pro-Brain Natriuretic Peptide: 3503 pg/mL (18 Sep 2017 06:48)    RVP-negative        Fluid characteristics  -- 09-17 @ 08:09 EXUDATE Effusion  pH >8    tprot 1.9    Amylase, Body Fluid (09.19.17 @ 14:23)  Culture - Body Fluid with Gram Stain (09.16.17 @ 00:58)    Gram Stain:   No polymorphonuclear cells seen  No organisms seen  by cytocentrifuge    Specimen Source: .Body Fluid Pleural Fluid    Culture Results:   No growth      Amylase, Body Fluid: 32:   obtained from Left CT            CULTURES: Culture - Blood (09.18.17 @ 08:34)    Specimen Source: .Blood Blood    Culture Results:   No growth to date.  Culture - Body Fluid with Gram Stain (09.17.17 @ 12:42)    Gram Stain:   No organisms seen per oil power field  polymorphonuclear leukocytes seen     per low power field  by cytocentrifuge    Specimen Source: Pleural Fl Other, Pleural Fluid    Culture Results:   No growth to date.    Culture - Blood (09.16.17 @ 15:30)    Specimen Source: .Blood Blood-Venous    Culture Results:   No growth to date.      9/19- cdiff indeterminante          Physical Examination:  PULM: diffuse wheeze via all lobes, + tracheal wheezing, no significant sputum production  CVS: S1, S2 heard    RADIOLOGY REVIEWED  CXR: < from: Xray Chest 1 View AP -PORTABLE-Routine (09.20.17 @ 07:21) >  Right airspace opacities and reticular opacities not significantly   changed. Left Lung is clear. No pneumothorax. Skinfold overlies the right   upper lung.    < end of copied text >      CT chest:< from: CT Angio Chest w/ IV Cont (09.15.17 @ 00:51) >    IMPRESSION:     No pulmonary embolism.    Status post distal esophagectomy with gastric pull-through.   Evaluation   for persistent leak is limited on this examination without contrast.    Moderate/large left pleural effusion, increased in size since 8/26/2017.   Stable small/moderate loculated right pleural effusion with a right-sided   chest tube in place.    Small pericardial effusion is new since 08/26/2017.    Mild subcutaneous edema.    < end of copied text > Follow-up Pulm Progress Note    + respiratory distress, coughing and wheexing. O2 sat 95% on 3lnc  + leak with esophagram  to Right pleural space  Left CT =80cc/24 hrs    Medications:  MEDICATIONS  (STANDING):  influenza   Vaccine 0.5 milliLiter(s) IntraMuscular once  cefepime  IVPB 1000 milliGRAM(s) IV Intermittent every 12 hours  metroNIDAZOLE  IVPB 500 milliGRAM(s) IV Intermittent every 8 hours  cyanocobalamin Injectable 500 MICROGram(s) SubCutaneous daily  pyridoxine Injectable 100 milliGRAM(s) IV Push daily  folic acid Injectable 1 milliGRAM(s) IV Push daily  fosphenytoin IVPB 200 milliGRAM(s) PE IV Intermittent every 12 hours  heparin  Infusion 1000 Unit(s)/Hr (14 mL/Hr) IV Continuous <Continuous>  ALBUTerol/ipratropium for Nebulization 3 milliLiter(s) Nebulizer every 4 hours  doxazosin 2 milliGRAM(s) Oral at bedtime  potassium phosphate IVPB 15 milliMole(s) IV Intermittent every 6 hours  vancomycin    Solution 125 milliGRAM(s) Oral every 6 hours  methylPREDNISolone sodium succinate Injectable 20 milliGRAM(s) IV Push every 8 hours  vancomycin  IVPB 750 milliGRAM(s) IV Intermittent every 12 hours  aspirin  chewable 81 milliGRAM(s) Oral daily  clopidogrel Tablet 75 milliGRAM(s) Oral daily    MEDICATIONS  (PRN):  sodium chloride 0.65% Nasal 1 Spray(s) Both Nostrils three times a day PRN Nasal Congestion          Vital Signs Last 24 Hrs  T(C): 36.6 (20 Sep 2017 07:00), Max: 36.6 (20 Sep 2017 04:15)  T(F): 97.8 (20 Sep 2017 07:00), Max: 97.8 (20 Sep 2017 04:15)  HR: 106 (20 Sep 2017 09:00) (74 - 110)  BP: 101/50 (20 Sep 2017 09:00) (79/49 - 174/74)  BP(mean): 70 (20 Sep 2017 09:00) (59 - 106)  RR: 49 (20 Sep 2017 09:00) (27 - 63)  SpO2: 97% (20 Sep 2017 09:00) (89% - 100%)    ABG - ( 20 Sep 2017 00:07 )  pH: 7.40  /  pCO2: 39    /  pO2: 91    / HCO3: 24    / Base Excess: .0    /  SaO2: 98                    09-19 @ 07:01  -  09-20 @ 07:00  --------------------------------------------------------  IN: 2170.7 mL / OUT: 1365 mL / NET: 805.7 mL          LABS:                        7.8    14.4  )-----------( 239      ( 20 Sep 2017 08:42 )             23.8     09-20    141  |  108  |  29<H>  ----------------------------<  104<H>  3.7   |  21<L>  |  0.65    Ca    8.5      20 Sep 2017 03:18  Phos  2.3     09-20  Mg     2.4     09-20        CARDIAC MARKERS ( 20 Sep 2017 08:42 )  x     / 0.54 ng/mL / 126 U/L / x     / 8.3 ng/mL  CARDIAC MARKERS ( 20 Sep 2017 00:07 )  x     / 0.50 ng/mL / 133 U/L / x     / 8.0 ng/mL  CARDIAC MARKERS ( 19 Sep 2017 17:37 )  x     / 0.54 ng/mL / 105 U/L / x     / 7.3 ng/mL  CARDIAC MARKERS ( 19 Sep 2017 09:24 )  x     / 0.54 ng/mL / 122 U/L / x     / 7.7 ng/mL  CARDIAC MARKERS ( 19 Sep 2017 01:01 )  x     / 0.54 ng/mL / 100 U/L / x     / 8.1 ng/mL  CARDIAC MARKERS ( 18 Sep 2017 18:38 )  x     / 0.54 ng/mL / 39 U/L / x     / 5.6 ng/mL  CARDIAC MARKERS ( 18 Sep 2017 12:54 )  x     / 0.42 ng/mL / 39 U/L / x     / 5.5 ng/mL      CAPILLARY BLOOD GLUCOSE        PT/INR - ( 20 Sep 2017 03:18 )   PT: 18.5 sec;   INR: 1.68 ratio         PTT - ( 20 Sep 2017 08:42 )  PTT:55.7 sec    Procalcitonin, Serum: 0.24 ng/mL (18 Sep 2017 03:30)    Serum Pro-Brain Natriuretic Peptide: 5356 pg/mL (20 Sep 2017 03:18)  Serum Pro-Brain Natriuretic Peptide: 3503 pg/mL (18 Sep 2017 06:48)    RVP-negative        Fluid characteristics  -- 09-17 @ 08:09 EXUDATE Effusion  pH >8    tprot 1.9    Amylase, Body Fluid (09.19.17 @ 14:23)  Culture - Body Fluid with Gram Stain (09.16.17 @ 00:58)    Gram Stain:   No polymorphonuclear cells seen  No organisms seen  by cytocentrifuge    Specimen Source: .Body Fluid Pleural Fluid    Culture Results:   No growth      Amylase, Body Fluid: 32:   obtained from Left CT            CULTURES: Culture - Blood (09.18.17 @ 08:34)    Specimen Source: .Blood Blood    Culture Results:   No growth to date.  Culture - Body Fluid with Gram Stain (09.17.17 @ 12:42)    Gram Stain:   No organisms seen per oil power field  polymorphonuclear leukocytes seen     per low power field  by cytocentrifuge    Specimen Source: Pleural Fl Other, Pleural Fluid    Culture Results:   No growth to date.    Culture - Blood (09.16.17 @ 15:30)    Specimen Source: .Blood Blood-Venous    Culture Results:   No growth to date.      9/19- cdiff indeterminante          Physical Examination:  PULM: diffuse wheeze via all lobes, + tracheal wheezing, no significant sputum production  CVS: S1, S2 heard    RADIOLOGY REVIEWED  CXR: < from: Xray Chest 1 View AP -PORTABLE-Routine (09.20.17 @ 07:21) >  Right airspace opacities and reticular opacities not significantly   changed. Left Lung is clear. No pneumothorax. Skinfold overlies the right   upper lung.    < end of copied text >      CT chest:< from: CT Angio Chest w/ IV Cont (09.15.17 @ 00:51) >    IMPRESSION:     No pulmonary embolism.    Status post distal esophagectomy with gastric pull-through.   Evaluation   for persistent leak is limited on this examination without contrast.    Moderate/large left pleural effusion, increased in size since 8/26/2017.   Stable small/moderate loculated right pleural effusion with a right-sided   chest tube in place.    Small pericardial effusion is new since 08/26/2017.    Mild subcutaneous edema.    < end of copied text > Follow-up Pulm Progress Note    + respiratory distress, coughing and wheezing. O2 sat 95% on 3lnc  + leak with esophagram  to Right pleural space  Left CT =80cc/24 hrs    Medications:  MEDICATIONS  (STANDING):  influenza   Vaccine 0.5 milliLiter(s) IntraMuscular once  cefepime  IVPB 1000 milliGRAM(s) IV Intermittent every 12 hours  metroNIDAZOLE  IVPB 500 milliGRAM(s) IV Intermittent every 8 hours  cyanocobalamin Injectable 500 MICROGram(s) SubCutaneous daily  pyridoxine Injectable 100 milliGRAM(s) IV Push daily  folic acid Injectable 1 milliGRAM(s) IV Push daily  fosphenytoin IVPB 200 milliGRAM(s) PE IV Intermittent every 12 hours  heparin  Infusion 1000 Unit(s)/Hr (14 mL/Hr) IV Continuous <Continuous>  ALBUTerol/ipratropium for Nebulization 3 milliLiter(s) Nebulizer every 4 hours  doxazosin 2 milliGRAM(s) Oral at bedtime  potassium phosphate IVPB 15 milliMole(s) IV Intermittent every 6 hours  vancomycin    Solution 125 milliGRAM(s) Oral every 6 hours  methylPREDNISolone sodium succinate Injectable 20 milliGRAM(s) IV Push every 8 hours  vancomycin  IVPB 750 milliGRAM(s) IV Intermittent every 12 hours  aspirin  chewable 81 milliGRAM(s) Oral daily  clopidogrel Tablet 75 milliGRAM(s) Oral daily    MEDICATIONS  (PRN):  sodium chloride 0.65% Nasal 1 Spray(s) Both Nostrils three times a day PRN Nasal Congestion    Vital Signs Last 24 Hrs  T(C): 36.6 (20 Sep 2017 07:00), Max: 36.6 (20 Sep 2017 04:15)  T(F): 97.8 (20 Sep 2017 07:00), Max: 97.8 (20 Sep 2017 04:15)  HR: 106 (20 Sep 2017 09:00) (74 - 110)  BP: 101/50 (20 Sep 2017 09:00) (79/49 - 174/74)  BP(mean): 70 (20 Sep 2017 09:00) (59 - 106)  RR: 49 (20 Sep 2017 09:00) (27 - 63)  SpO2: 97% (20 Sep 2017 09:00) (89% - 100%)    ABG - ( 20 Sep 2017 00:07 )  pH: 7.40  /  pCO2: 39    /  pO2: 91    / HCO3: 24    / Base Excess: .0    /  SaO2: 98        09-19 @ 07:01  -  09-20 @ 07:00  --------------------------------------------------------  IN: 2170.7 mL / OUT: 1365 mL / NET: 805.7 mL    LABS:                        7.8    14.4  )-----------( 239      ( 20 Sep 2017 08:42 )             23.8     09-20    141  |  108  |  29<H>  ----------------------------<  104<H>  3.7   |  21<L>  |  0.65    Ca    8.5      20 Sep 2017 03:18  Phos  2.3     09-20  Mg     2.4     09-20        CARDIAC MARKERS ( 20 Sep 2017 08:42 )  x     / 0.54 ng/mL / 126 U/L / x     / 8.3 ng/mL  CARDIAC MARKERS ( 20 Sep 2017 00:07 )  x     / 0.50 ng/mL / 133 U/L / x     / 8.0 ng/mL  CARDIAC MARKERS ( 19 Sep 2017 17:37 )  x     / 0.54 ng/mL / 105 U/L / x     / 7.3 ng/mL  CARDIAC MARKERS ( 19 Sep 2017 09:24 )  x     / 0.54 ng/mL / 122 U/L / x     / 7.7 ng/mL  CARDIAC MARKERS ( 19 Sep 2017 01:01 )  x     / 0.54 ng/mL / 100 U/L / x     / 8.1 ng/mL  CARDIAC MARKERS ( 18 Sep 2017 18:38 )  x     / 0.54 ng/mL / 39 U/L / x     / 5.6 ng/mL  CARDIAC MARKERS ( 18 Sep 2017 12:54 )  x     / 0.42 ng/mL / 39 U/L / x     / 5.5 ng/mL      CAPILLARY BLOOD GLUCOSE        PT/INR - ( 20 Sep 2017 03:18 )   PT: 18.5 sec;   INR: 1.68 ratio         PTT - ( 20 Sep 2017 08:42 )  PTT:55.7 sec    Procalcitonin, Serum: 0.24 ng/mL (18 Sep 2017 03:30)    Serum Pro-Brain Natriuretic Peptide: 5356 pg/mL (20 Sep 2017 03:18)  Serum Pro-Brain Natriuretic Peptide: 3503 pg/mL (18 Sep 2017 06:48)    RVP-negative        Fluid characteristics  -- 09-17 @ 08:09 EXUDATE Effusion  pH >8    tprot 1.9    Amylase, Body Fluid (09.19.17 @ 14:23)  Culture - Body Fluid with Gram Stain (09.16.17 @ 00:58)    Gram Stain:   No polymorphonuclear cells seen  No organisms seen  by cytocentrifuge    Specimen Source: .Body Fluid Pleural Fluid    Culture Results:   No growth      Amylase, Body Fluid: 32:   obtained from Left CT    CULTURES: Culture - Blood (09.18.17 @ 08:34)    Specimen Source: .Blood Blood    Culture Results:   No growth to date.  Culture - Body Fluid with Gram Stain (09.17.17 @ 12:42)    Gram Stain:   No organisms seen per oil power field  polymorphonuclear leukocytes seen     per low power field  by cytocentrifuge    Specimen Source: Pleural Fl Other, Pleural Fluid    Culture Results:   No growth to date.    Culture - Blood (09.16.17 @ 15:30)    Specimen Source: .Blood Blood-Venous    Culture Results:   No growth to date.      9/19- cdiff indeterminante          Physical Examination:  PULM: diffuse wheeze via all lobes, + tracheal wheezing, no significant sputum production  CVS: S1, S2 heard    RADIOLOGY REVIEWED  CXR: < from: Xray Chest 1 View AP -PORTABLE-Routine (09.20.17 @ 07:21) >  Right airspace opacities and reticular opacities not significantly   changed. Left Lung is clear. No pneumothorax. Skinfold overlies the right   upper lung.    < end of copied text >      CT chest:< from: CT Angio Chest w/ IV Cont (09.15.17 @ 00:51) >    IMPRESSION:     No pulmonary embolism.    Status post distal esophagectomy with gastric pull-through.   Evaluation   for persistent leak is limited on this examination without contrast.    Moderate/large left pleural effusion, increased in size since 8/26/2017.   Stable small/moderate loculated right pleural effusion with a right-sided   chest tube in place.    Small pericardial effusion is new since 08/26/2017.    Mild subcutaneous edema.    < end of copied text >

## 2017-09-20 NOTE — DISCHARGE NOTE ADULT - CARE PLAN
Principal Discharge DX:	Shortness of breath  Goal:	Return of normal respiratory function  Instructions for follow-up, activity and diet:	WOUND CARE:  Please keep incisions clean and dry. Please do not Scrub or rub incisions. Do not use lotion or powder on incisions.   BATHING: Please do not submerge wound underwater. You may shower and/or sponge bathe.  ACTIVITY: No heavy lifting or straining. Otherwise, you may return to your usual level of physical activity. If you are taking narcotic pain medication (such as Percocet) DO NOT drive a car, operate machinery or make important decisions.  DIET: Return to your usual diet.  NOTIFY YOUR SURGEON IF: You have any bleeding that does not stop, any pus draining from your wound(s), any fever (over 100.4 F) or chills, persistent nausea/vomiting, persistent diarrhea, or if your pain is not controlled on your discharge pain medications.  FOLLOW-UP: Please follow up with your primary care physician in one week regarding your hospitalization. Please follow-up with your surgeon, within 7 days following discharge- please call to schedule an appointment.

## 2017-09-20 NOTE — PROGRESS NOTE ADULT - ATTENDING COMMENTS
Agree with above  d/w Dr. Cohen and ONEIL PA - repeat CT chest/abdomen tomorrow (they will decide on protocol)  check combicath  cont nebs/ ? need for steroids  now intubated

## 2017-09-20 NOTE — PROGRESS NOTE ADULT - ATTENDING COMMENTS
Spoke with patients family at bedside at length.  All questions answered.  Explained current status in a systems-based fashion.  It is likely that his MOSF is due to the esophageal leak.  Currently he is on small dose of pressors, intubated and sedated because of respiratory distress.  He is on full tube feeds.  Esophagram from yesterday shows free leak into pleural space.  Spoke to Dr. Weinstein on the phone and he would like to transfer patient to CTICU at [facility] and potentially perform an EGD tomorrow.  We will also pursue a repeat CT chest.

## 2017-09-20 NOTE — DISCHARGE NOTE ADULT - HOSPITAL COURSE
82 M presents with non-functioning J tube and SOB. 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 Dyer and Dr. Christian Weinstein on 8/14/17. After surgery, patient was found to have an anastomotic leak and was treated conservatively with NPO, antibiotics, chest tube drainage, and J tube feeding. Patient was eventually discharged to rehab. On day of evaluation, patient's J tube was unable to be used, and was sent to the ED for evaluation. Patient also complains of SOB, but no chest pain or abdominal pain. Denies fever or chills. Patient was taking medications by mouth, but was only getting nutrition via his J tube.  Patient was found to have a Left pleural effusion. Patent received pigtail and developed respiratory distress. Patient was transferred to the SICU where he was intubated. Patient is now being transferred to [facility] CTICU for optimal management.

## 2017-09-20 NOTE — PROGRESS NOTE ADULT - ASSESSMENT
82 M presents with non-functioning J tube and SOB. 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 Dyer and Dr. Christian Weinstein on 8/14/17. After surgery, patient was found to have an anastomotic leak and was treated conservatively with NPO, antibiotics, chest tube drainage, and J tube feeding. Patient was eventually discharged to rehab. On day of evaluation, patient's J tube was unable to be used, and was sent to the ED for evaluation. Now w/ left pleural effusion and leukocytosis on admission.  S/p thoracentesis with Cdiff/leukocytosis

## 2017-09-20 NOTE — PROGRESS NOTE ADULT - SUBJECTIVE AND OBJECTIVE BOX
SURGERY DAILY PROGRESS NOTE:     Subjective: Patient seen and examined at bedside. Diarrhea x 8 overnight, c. diff sent. Remains on Ryan. Intubated this morning 2/2 respiratory distress.       Objective:  Vital signs  T(F): , Max: 97.8 (09-20-17 @ 04:15)  HR: 100 (09-20-17 @ 11:30)  BP: 94/53 (09-20-17 @ 11:30)  SpO2: 98% (09-20-17 @ 11:30)  Wt(kg): --    I&O's Summary    19 Sep 2017 07:01  -  20 Sep 2017 07:00  --------------------------------------------------------  IN: 2170.7 mL / OUT: 1365 mL / NET: 805.7 mL    20 Sep 2017 07:01  -  20 Sep 2017 11:40  --------------------------------------------------------  IN: 354.7 mL / OUT: 55 mL / NET: 299.7 mL    López: 545cc/12hrs  Pigtail: 80cc/24  R GINA: 0cc    Gen: NAD, A+Ox3  Abd: Soft, NT, ND  : clear urine    Labs:    09-20    14.4<H> / 23.8<L> /x        09-20    15.1<H> / 25.7<L> /0.65                           7.8    14.4  )-----------( 239      ( 20 Sep 2017 08:42 )             23.8     09-20    141  |  108  |  29<H>  ----------------------------<  104<H>  3.7   |  21<L>  |  0.65    Ca    8.5      20 Sep 2017 03:18  Phos  2.3     09-20  Mg     2.4     09-20      PT/INR - ( 20 Sep 2017 03:18 )   PT: 18.5 sec;   INR: 1.68 ratio         PTT - ( 20 Sep 2017 08:42 )  PTT:55.7 sec

## 2017-09-20 NOTE — PROGRESS NOTE ADULT - PROBLEM SELECTOR PLAN 3
await esophagram to eval for leak
+ leak to Right pleural space seen on esophagram 9/19  management as per surgery
-initial +ve blood cx is likely contaminant  -repeat bcx -ve
await esophagram to eval for leak
s/p esophagram f/u results r/o leak

## 2017-09-20 NOTE — PROGRESS NOTE ADULT - ATTENDING COMMENTS
Patient seen and examined on AM rounds with SICU staff.    Patient is awake and alert, but confused  Still requiring low-dose Ryan-Synephrine to maintain MAP greater than 65, likely secondary to low-grade sepsis from esophageal leak  With increasing respiratory distress and stating "it is hard to breath" on rounds.    Given increasing work of breathing, decision made on afternoon rounds to intubate patient in a controlled manner.  Anesthesia team arrived in SICU to assist in intubation  No immediate complications after intubation    On antibiotics for c.dif and esophageal leak as per ID.  ID consult and follow appreciated.    Family as bedside after intubation and updated by SICU team.    45 minutes spent directly managing critical care

## 2017-09-20 NOTE — PROGRESS NOTE ADULT - SUBJECTIVE AND OBJECTIVE BOX
in  a chair,  coughing     MEDICATIONS  (STANDING):  influenza   Vaccine 0.5 milliLiter(s) IntraMuscular once  cefepime  IVPB 1000 milliGRAM(s) IV Intermittent every 12 hours  metroNIDAZOLE  IVPB 500 milliGRAM(s) IV Intermittent every 8 hours  cyanocobalamin Injectable 500 MICROGram(s) SubCutaneous daily  pyridoxine Injectable 100 milliGRAM(s) IV Push daily  folic acid Injectable 1 milliGRAM(s) IV Push daily  fosphenytoin IVPB 200 milliGRAM(s) PE IV Intermittent every 12 hours  heparin  Infusion 1000 Unit(s)/Hr (14 mL/Hr) IV Continuous <Continuous>  ALBUTerol/ipratropium for Nebulization 3 milliLiter(s) Nebulizer every 4 hours  doxazosin 2 milliGRAM(s) Oral at bedtime  potassium phosphate IVPB 15 milliMole(s) IV Intermittent every 6 hours  vancomycin    Solution 125 milliGRAM(s) Oral every 6 hours  methylPREDNISolone sodium succinate Injectable 20 milliGRAM(s) IV Push every 8 hours  vancomycin  IVPB 750 milliGRAM(s) IV Intermittent every 12 hours  aspirin  chewable 81 milliGRAM(s) Oral daily  clopidogrel Tablet 75 milliGRAM(s) Oral daily  buDESOnide   0.5 milliGRAM(s) Respule 0.5 milliGRAM(s) Inhalation every 12 hours  phenylephrine    Infusion 0.3 MICROgram(s)/kG/Min (7.177 mL/Hr) IV Continuous <Continuous>    MEDICATIONS  (PRN):  sodium chloride 0.65% Nasal 1 Spray(s) Both Nostrils three times a day PRN Nasal Congestion      Vital Signs Last 24 Hrs  T(C): 36.6 (20 Sep 2017 07:00), Max: 36.6 (20 Sep 2017 04:15)  T(F): 97.8 (20 Sep 2017 07:00), Max: 97.8 (20 Sep 2017 04:15)  HR: 103 (20 Sep 2017 10:15) (74 - 110)  BP: 148/75 (20 Sep 2017 10:15) (82/51 - 174/74)  BP(mean): 104 (20 Sep 2017 10:15) (60 - 106)  RR: 22 (20 Sep 2017 10:15) (16 - 63)  SpO2: 100% (20 Sep 2017 10:15) (89% - 100%)  CAPILLARY BLOOD GLUCOSE        I&O's Summary    19 Sep 2017 07:01  -  20 Sep 2017 07:00  --------------------------------------------------------  IN: 2170.7 mL / OUT: 1365 mL / NET: 805.7 mL    20 Sep 2017 07:01  -  20 Sep 2017 10:41  --------------------------------------------------------  IN: 354.7 mL / OUT: 55 mL / NET: 299.7 mL        PHYSICAL EXAM:  HEAD:  Atraumatic, Normocephalic  NECK: Supple, No JVD  CHEST/LUNG:   chest  tube, left    HEART: Regular rate and rhythm; No murmurs, rubs, or gallops  ABDOMEN: Soft, Nontender, ; Bowel sounds   EXTREMITIES:    no  edema  NEUROLOGY:  alert    LABS:                        7.8    14.4  )-----------( 239      ( 20 Sep 2017 08:42 )             23.8     09-20    141  |  108  |  29<H>  ----------------------------<  104<H>  3.7   |  21<L>  |  0.65    Ca    8.5      20 Sep 2017 03:18  Phos  2.3     09-20  Mg     2.4     09-20      PT/INR - ( 20 Sep 2017 03:18 )   PT: 18.5 sec;   INR: 1.68 ratio         PTT - ( 20 Sep 2017 08:42 )  PTT:55.7 sec  CARDIAC MARKERS ( 20 Sep 2017 08:42 )  x     / 0.54 ng/mL / 126 U/L / x     / 8.3 ng/mL  CARDIAC MARKERS ( 20 Sep 2017 00:07 )  x     / 0.50 ng/mL / 133 U/L / x     / 8.0 ng/mL  CARDIAC MARKERS ( 19 Sep 2017 17:37 )  x     / 0.54 ng/mL / 105 U/L / x     / 7.3 ng/mL  CARDIAC MARKERS ( 19 Sep 2017 09:24 )  x     / 0.54 ng/mL / 122 U/L / x     / 7.7 ng/mL  CARDIAC MARKERS ( 19 Sep 2017 01:01 )  x     / 0.54 ng/mL / 100 U/L / x     / 8.1 ng/mL  CARDIAC MARKERS ( 18 Sep 2017 18:38 )  x     / 0.54 ng/mL / 39 U/L / x     / 5.6 ng/mL  CARDIAC MARKERS ( 18 Sep 2017 12:54 )  x     / 0.42 ng/mL / 39 U/L / x     / 5.5 ng/mL          ABG - ( 20 Sep 2017 10:26 )  pH: 7.31  /  pCO2: 48    /  pO2: 158   / HCO3: 24    / Base Excess: -2.0  /  SaO2: 99                  Hemoglobin A1C, Whole Blood: 5.2 % (06-30 @ 19:48)        Consultant(s) Notes Reviewed:      Care Discussed with Consultants/Other Providers:

## 2017-09-20 NOTE — PROGRESS NOTE ADULT - PROBLEM SELECTOR PROBLEM 2
Pleural effusion

## 2017-09-20 NOTE — PROGRESS NOTE ADULT - PROBLEM SELECTOR PLAN 6
acute bronchospasm  copd vs asthma exacerbation vs. ? aspiration   keep o2 sat >=90% w 3l nc  begin solumedrol 20 mg iv q8h  on duonebs 1 unit dose hhn q4h  begin: pulmicort 0.5mg hhn q12  RVP pannel is negative  check abg    d/w sicu pa

## 2017-09-20 NOTE — PROGRESS NOTE ADULT - ASSESSMENT
82 M presents with non-functioning J tube and SOB.  Pt diagnosed with esophageal adenocarcinoma s/p tx with chemo and radiation therapy. 8/14/17 Patient  underwent a robotic-assisted [place]-Trent esophagogastrectomy with feeding J tube placement for esophageal adenocarcinoma (T1aN0). Pt with esophageal leak-R chest tube-suction bulb. P/W non function JTube-flushed-now working. Dyspnea, hypoxia,leukocytosis, Left pleural effusion with concern for esophageal leak.  Status post pigtail catheter placement and currently feeling much better.  developed NSTEMI with hx of cad and known stents, L pleural effusion = exudate effusion. 9/20 coughing and acutely wheezing, with infiltrate  on cxr.

## 2017-09-20 NOTE — H&P ADULT - HISTORY OF PRESENT ILLNESS
82 M presents with non-functioning J tube and SOB. 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 Dyer and Dr. Christian Weinstein on 8/14/17. After surgery, patient was found to have an anastomotic leak and was treated conservatively with NPO, antibiotics, chest tube drainage, and J tube feeding. Patient was eventually discharged to rehab. On day of evaluation, patient's J tube was unable to be used, and was sent to the ED for evaluation. Patient also complains of SOB, but no chest pain or abdominal pain. Denies fever or chills. Patient was taking medications by mouth, but was only getting nutrition via his J tube.  Patient was found to have a Left pleural effusion. Patent received pigtail and developed respiratory distress. Patient was transferred to the SICU where he was intubated. Patient is now being transferred to [facility]ICU for optimal management. 82 M presents with non-functioning J tube and SOB. 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 Dyer and Dr. Christian Weinstein on 8/14/17. After surgery, patient was found to have an anastomotic leak and was treated conservatively with NPO, antibiotics, chest tube drainage, and J tube feeding. Patient was eventually discharged to rehab. On day of evaluation, patient's J tube was unable to be used, and was sent to the ED for evaluation. Patient also complains of SOB, but no chest pain or abdominal pain. Denies fever or chills. Patient was taking medications by mouth, but was only getting nutrition via his J tube. Esophagram done on 9/19/17 showing  a leak at the distal site just above the hemidiaphragm into the pleural space.   Patient was found to have a Left pleural effusion. Patent received pigtail and developed respiratory distress. Patient was transferred to the SICU where he was intubated. Patient also with right GINA drain.  Patient is now being transferred to [facility]ICU for optimal management.

## 2017-09-20 NOTE — PROGRESS NOTE ADULT - PROBLEM SELECTOR PROBLEM 1
Hypoxia
Leukocytosis
Pleural effusion
Hypoxia
Pleural effusion
Pleural effusion
Leukocytosis

## 2017-09-20 NOTE — H&P ADULT - PSH
Anastomotic leak following esophagectomy  Vance logan esophagectomy  H/O heart artery stent  June 30, 2017  History of hernia repair    History of tonsillectomy    History of total hip replacement  left  Knee arthropathy  left

## 2017-09-20 NOTE — PROGRESS NOTE ADULT - PROBLEM SELECTOR PLAN 4
wbc elevated -now trending down  all cx neg, pleural fluid left ct cx is negative -Left pleural effusion = exudate  amylase Left CT=32  Consider sending R CT for pleural fluid analysis and cx (if draining/working) -reported that is drained dark fluid prior to admit to hosp. May be source of infection.    9/20 CXR = with Right effusion and R  airspace/reticular opacities  concern for developing PNA, with prior pleural effusion-reported as a complex effusion.  would check ct chest no contrast eval lung parenchyma r/o PNA, r/o PNA with parapneumonic effusion., r/o empyema-doubt. Pt with Right chest drain, was malfunctioning with drainage-needs to be reeval  Thoracic surgery f/u  broad spectrum abx-vanco cefepime, flagyl  id f/u wbc elevated -now trending down  all cx neg, pleural fluid left ct cx is negative -Left pleural effusion = exudate  amylase Left CT=32  Consider sending R CT for pleural fluid analysis and cx (if draining/working) -reported that is drained dark fluid prior to admit to hosp. May be source of infection.    9/20 CXR = with Right effusion and R  airspace/reticular opacities  concern for developing PNA, with prior pleural effusion-reported as a complex effusion.  would check ct chest no contrast eval lung parenchyma r/o PNA, r/o PNA with parapneumonic effusion., r/o empyema-doubt. Pt with Right chest drain, was malfunctioning with drainage-needs to be reeval  Thoracic surgery f/u  broad spectrum abx-vanco cefepime, flagyl  on enteric vanco for cdiff/indeterminante  id f/u

## 2017-09-20 NOTE — INPATIENT CERTIFICATION FOR MEDICARE PATIENTS - RISKS OF ADVERSE EVENTS
Concern for worsening infectious process/Concern for renal deterioration/Concern for neurologic deterioration/Concern for cardiopulmonary deterioration

## 2017-09-20 NOTE — PROGRESS NOTE ADULT - ASSESSMENT
· Assessment		  82y M,  transferred from rehab, s/p Vance-Trent, possible anastomotic leak, possible infected pleural effusion(s). Jejunostomy feeding tube unclogged by general surgery.    9/19 espohagram  revealed  leak into pleural space   9/20 intubated for airway protection due respiratory failure in the setting of hypoxia in am/increased work of breathing

## 2017-09-20 NOTE — DISCHARGE NOTE ADULT - PLAN OF CARE
Return of normal respiratory function WOUND CARE:  Please keep incisions clean and dry. Please do not Scrub or rub incisions. Do not use lotion or powder on incisions.   BATHING: Please do not submerge wound underwater. You may shower and/or sponge bathe.  ACTIVITY: No heavy lifting or straining. Otherwise, you may return to your usual level of physical activity. If you are taking narcotic pain medication (such as Percocet) DO NOT drive a car, operate machinery or make important decisions.  DIET: Return to your usual diet.  NOTIFY YOUR SURGEON IF: You have any bleeding that does not stop, any pus draining from your wound(s), any fever (over 100.4 F) or chills, persistent nausea/vomiting, persistent diarrhea, or if your pain is not controlled on your discharge pain medications.  FOLLOW-UP: Please follow up with your primary care physician in one week regarding your hospitalization. Please follow-up with your surgeon, within 7 days following discharge- please call to schedule an appointment.

## 2017-09-20 NOTE — PROGRESS NOTE ADULT - ASSESSMENT
ASSESSMENT: 82yMale with 2y M, s/p robot assisted [place]-Trent, possible persistent anastomotic leak, possible infected pleural effusion(s). Jejunostomy feeding tube unclogged by general surgery.     PLAN:     Neurologic:  pain control  fosphenytoin     Respiratory:  Maintain on NC, Keep sat >90%  Duoneb's/Albuterol PRN      Cardiovascular:  continue kamilah to goal of MAP >65   Continue BP meds with hold parameters   f/u cards recommendations going forward for NSTEMI  Continue Plavix, ASA   Vital Signs Q-4    Gastrointestinal/Nutrition:  NPO    Genitourinary/Renal:  Strict I's and O's     Hematologic:  Heparin drip as above     Infectious Disease:  - f/u BCx's,  - antibiotics - cefepime, flagyl  - daily CBC     Endocrine:  Continue Solumedrol for RA    Disposition: SICU

## 2017-09-20 NOTE — PROGRESS NOTE ADULT - SUBJECTIVE AND OBJECTIVE BOX
Hypoxic respiratory failure post op Vance Trent Esophagectomy post op day 6.   Anesthesia called for emergency intubation.  On arrival, pt fully awake and responsive, in severe respiratory distress, hemodynamically stable, Etomidate 20 mg + Succinylcholine 100 mg  being performed.  DL x _1_ with MAC 3 blade, grade 1___ view, __8_ cuffed ETT passed without trauma, + ETCO2 via EasyCap, + BBS, taped at __21__ cm, teeth intact, no complications.   Bag mask ventilation with 100% FiO2, B/L air entry. Stable hemodynamics.

## 2017-09-20 NOTE — H&P ADULT - NSHPLABSRESULTS_GEN_ALL_CORE
(09-20 @ 14:02)                      7.9  18.1 )-----------( 262                 22.5    Neutrophils = -- (--%)  Lymphocytes = -- (--%)  Eosinophils = -- (--%)  Basophils = -- (--%)  Monocytes = -- (--%)  Bands = --%    09-20    141  |  108  |  29<H>  ----------------------------<  104<H>  3.7   |  21<L>  |  0.65    Ca    8.5      20 Sep 2017 03:18  Phos  2.3     09-20  Mg     2.4     09-20      ( 20 Sep 2017 03:18 )   PT: 18.5 sec;   INR: 1.68 ratio;       PTT:55.7 sec  CARDIAC MARKERS ( 20 Sep 2017 20:43 )  Trop 0.34 ng/mL<H> / CK 57 U/L / CKMB x       CARDIAC MARKERS ( 20 Sep 2017 08:42 )  Trop 0.54 ng/mL<H> /  U/L / CKMB x       CARDIAC MARKERS ( 20 Sep 2017 00:07 )  Trop 0.50 ng/mL<H> /  U/L / CKMB x       CARDIAC MARKERS ( 19 Sep 2017 17:37 )  Trop 0.54 ng/mL<H> /  U/L / CKMB x       CARDIAC MARKERS ( 19 Sep 2017 09:24 )  Trop 0.54 ng/mL<H> /  U/L / CKMB x       CARDIAC MARKERS ( 19 Sep 2017 01:01 )  Trop 0.54 ng/mL<H> /  U/L / CKMB x           RVP:(09-19 @ 14:22)  [facility]        Arterial Blood Gas:  09-20 @ 12:52  7.39/32/193/19/100/-4.8  ABG lactate: --  Arterial Blood Gas:  09-20 @ 10:26  7.31/48/158/24/99/-2.0  ABG lactate: --      Tox: (09-20 @ 14:02)                      7.9  18.1 )-----------( 262                 22.5    Neutrophils = -- (--%)  Lymphocytes = -- (--%)  Eosinophils = -- (--%)  Basophils = -- (--%)  Monocytes = -- (--%)  Bands = --%    09-20    141  |  108  |  29<H>  ----------------------------<  104<H>  3.7   |  21<L>  |  0.65    Ca    8.5      20 Sep 2017 03:18  Phos  2.3     09-20  Mg     2.4     09-20      ( 20 Sep 2017 03:18 )   PT: 18.5 sec;   INR: 1.68 ratio;       PTT:55.7 sec  CARDIAC MARKERS ( 20 Sep 2017 20:43 )  Trop 0.34 ng/mL<H> / CK 57 U/L / CKMB x       CARDIAC MARKERS ( 20 Sep 2017 08:42 )  Trop 0.54 ng/mL<H> /  U/L / CKMB x       CARDIAC MARKERS ( 20 Sep 2017 00:07 )  Trop 0.50 ng/mL<H> /  U/L / CKMB x       CARDIAC MARKERS ( 19 Sep 2017 17:37 )  Trop 0.54 ng/mL<H> /  U/L / CKMB x       CARDIAC MARKERS ( 19 Sep 2017 09:24 )  Trop 0.54 ng/mL<H> /  U/L / CKMB x       CARDIAC MARKERS ( 19 Sep 2017 01:01 )  Trop 0.54 ng/mL<H> /  U/L / CKMB x           RVP:(09-19 @ 14:22)  [facility]        Arterial Blood Gas:  09-20 @ 12:52  7.39/32/193/19/100/-4.8  ABG lactate: --  Arterial Blood Gas:  09-20 @ 10:26  7.31/48/158/24/99/-2.0  ABG lactate: --      Esophagram done on 9/19/17 showing  a leak at the distal site just above the hemidiaphragm into the pleural space.

## 2017-09-20 NOTE — PROGRESS NOTE ADULT - PROBLEM SELECTOR PROBLEM 3
Esophagectomy, anastomotic leak
Positive blood culture

## 2017-09-20 NOTE — DISCHARGE NOTE ADULT - PATIENT PORTAL LINK FT
“You can access the [facility] Patient Portal, offered by [facility], by registering with the following website: [email or web address]”

## 2017-09-20 NOTE — PROGRESS NOTE ADULT - SUBJECTIVE AND OBJECTIVE BOX
MEJIA LATIF is 82y M, s/p esophagogastrectomy (partial) with intra-thoracic esophagogastric anastomosis (Vance-Trent procedure) on 8/14/17 - post-op complicated by anastomotic leak, discharged to [facility] on 8/27 with R chest drain in place. He was admitted from rehab overnight with clogged jejunostomy feeding tube. He was taking medications by mouth. Patient reports ongoing SOB, states that he has not required oxygen while at rehab. Denies fevers/chills. Per patient, drainage from R chest has been minimal, murky brown fluid. The R CT drain's bulb has been difficult to keep to self-suction, as it fills with air quickly. Patient reports passage of flatus and stool. L pigtail catheter placed to drain pleural effusion. Of note, patient is on prednisone 5 mg daily for rheumatoid arthritis. Pt recently experienced episode of sternal chest pain with elevated trops to 0.4,, cardiology evaluated, now following, concern for NSTEMI, pt started on nitro gtt, currently on Heparin gtt.     24 HOUR EVENTS: repeat esophagram c/w leak at distal site above hemidiaphragm into pleural space, pigtail now to water-seal noted to have leak at drain site, pt became increasingly tachypneic during day, agitated overnight given ativan and haldol, repeat cxr pending final read. MEJIA LATIF is 82y M, s/p esophagogastrectomy (partial) with intra-thoracic esophagogastric anastomosis (Vance-Trent procedure) on 8/14/17 - post-op complicated by anastomotic leak, discharged to [facility] on 8/27 with R chest drain in place. He was admitted from rehab overnight with clogged jejunostomy feeding tube. He was taking medications by mouth. Patient reports ongoing SOB, states that he has not required oxygen while at rehab. Denies fevers/chills. Per patient, drainage from R chest has been minimal, murky brown fluid. The R CT drain's bulb has been difficult to keep to self-suction, as it fills with air quickly. Patient reports passage of flatus and stool. L pigtail catheter placed to drain pleural effusion. Of note, patient is on prednisone 5 mg daily for rheumatoid arthritis. Pt recently experienced episode of sternal chest pain with elevated trops to 0.4,, cardiology evaluated, now following, concern for NSTEMI, pt started on nitro gtt, currently on Heparin gtt.     24 HOUR EVENTS: repeat esophagram c/w leak at distal site above hemidiaphragm into pleural space, pigtail now to water-seal noted to have leak at drain site, pt became increasingly tachypneic during day, agitated overnight given ativan and haldol, repeat cxr pending final read.          SUBJECTIVE/ROS:  [ x] A ten-point review of systems was otherwise negative except as noted.  [ ] Due to altered mental status/intubation, subjective information were not able to be obtained from the patient. History was obtained, to the extent possible, from review of the chart and collateral sources of information.      NEURO: alert, altered/agitated, EOM, PEERLa   RASS:     GCS:     CAM ICU:  Exam: awake, alert, oriented  Meds: fosphenytoin IVPB 200 milliGRAM(s) PE IV Intermittent every 12 hours    [x] Adequacy of sedation and pain control has been assessed and adjusted      RESPIRATORY  RR: 46 (09-20-17 @ 04:00) (25 - 60)  SpO2: 95% (09-20-17 @ 04:00) (89% - 100%)  Wt(kg): --  Exam: unlabored, clear to auscultation bilaterally no c/r/w/r  Mechanical Ventilation:   ABG - ( 20 Sep 2017 00:07 )  pH: 7.40  /  pCO2: 39    /  pO2: 91    / HCO3: 24    / Base Excess: .0    /  SaO2: 98      Lactate: x        Meds: ALBUTerol/ipratropium for Nebulization 3 milliLiter(s) Nebulizer every 4 hours        CARDIOVASCULAR  HR: 107 (09-20-17 @ 04:00) (74 - 107)  BP: 95/53 (09-20-17 @ 04:00) (79/49 - 174/74)  BP(mean): 68 (09-20-17 @ 04:00) (59 - 106)  ABP: 103/63 (09-20-17 @ 04:00) (76/43 - 234/221)  ABP(mean): 83 (09-20-17 @ 04:00) (54 - 229)  Wt(kg): --  CVP(cm H2O): --      Exam: regular rate and rhythm, S1 S2 no c/r/m/g  Cardiac Rhythm: sinus  Perfusion     [x]Adequate   [ ]Inadequate  Mentation   [x]Normal       [ ]Reduced  Extremities  [x]Warm         [ ]Cool  Volume Status [ ]Hypervolemic [x]Euvolemic [ ]Hypovolemic  Meds: phenylephrine    Infusion 0.5 MICROgram(s)/kG/Min IV Continuous <Continuous>  doxazosin 2 milliGRAM(s) Oral at bedtime        GI/NUTRITION  Exam: soft, nontender, nondistended, incision C/D/I  Diet: NPO   Meds:     GENITOURINARY  I&O's Detail      09-20    141  |  108  |  29<H>  ----------------------------<  104<H>  3.7   |  21<L>  |  0.65    Ca    8.5      20 Sep 2017 03:18  Phos  2.3     09-20  Mg     2.4     09-20      [ x] López catheter, indication:  Meds: cyanocobalamin Injectable 500 MICROGram(s) SubCutaneous daily  pyridoxine Injectable 100 milliGRAM(s) IV Push daily  folic acid Injectable 1 milliGRAM(s) IV Push daily        HEMATOLOGIC  Meds: clopidogrel Tablet 75 milliGRAM(s) Oral daily  aspirin  chewable 81 milliGRAM(s) Oral daily  heparin  Infusion 1000 Unit(s)/Hr IV Continuous <Continuous>    [x] VTE Prophylaxis                        8.6    15.1  )-----------( 273      ( 20 Sep 2017 03:18 )             25.7     PT/INR - ( 20 Sep 2017 03:18 )   PT: 18.5 sec;   INR: 1.68 ratio         PTT - ( 20 Sep 2017 03:18 )  PTT:46.6 sec  Transfusion     [ ] PRBC   [ ] Platelets   [ ] FFP   [ ] Cryoprecipitate      INFECTIOUS DISEASES  WBC Count: 15.1 K/uL (09-20 @ 03:18)    RECENT CULTURES:  NGTD    Meds: influenza   Vaccine 0.5 milliLiter(s) IntraMuscular once  cefepime  IVPB 1000 milliGRAM(s) IV Intermittent every 12 hours  metroNIDAZOLE  IVPB 500 milliGRAM(s) IV Intermittent every 8 hours        ENDOCRINE  CAPILLARY BLOOD GLUCOSE    Meds: methylPREDNISolone sodium succinate Injectable 4 milliGRAM(s) IV Push every 24 hours        ACCESS DEVICES:  [ ] Peripheral IV  [ ] Central Venous Line	[ ] R	[ ] L	[ ] IJ	[ ] Fem	[ ] SC	Placed:   [ ] Arterial Line		[ ] R	[ ] L	[ ] Fem	[ ] Rad	[ ] Ax	Placed:   [ ] PICC:					[ ] Mediport  [ ] Urinary Catheter, Date Placed:   [x] Necessity of urinary, arterial, and venous catheters discussed    OTHER MEDICATIONS:  sodium chloride 0.65% Nasal 1 Spray(s) Both Nostrils three times a day PRN  lidocaine 2% Jelly 5 milliLiter(s) IntraUrethral once      CODE STATUS:      IMAGING:   from: Xray Esophagram (09.19.17 @ 12:08)   IMPRESSION:     Status post Vance Trent. Findings as described above consistent with a   leak at the distal site just above the hemidiaphragm into the pleural   space. 24 HOUR EVENTS: repeat esophagram c/w leak at distal site above hemidiaphragm into pleural space, pigtail now to water-seal noted to have leak at drain site, pt became increasingly tachypneic during day, agitated overnight given ativan and haldol.      NEURO: alert, mildly confused   Meds: fosphenytoin IVPB 200 milliGRAM(s) PE IV Intermittent every 12 hours      RESPIRATORY  RR: 46 (09-20-17 @ 04:00) (25 - 60)  SpO2: 95% (09-20-17 @ 04:00) (89% - 100%)  Increasing respiratory distress on AM SICU rounds        CARDIOVASCULAR  HR: 107 (09-20-17 @ 04:00) (74 - 107)  BP: 95/53 (09-20-17 @ 04:00) (79/49 - 174/74)    Perfusion     [x]Adequate   [ ]Inadequate  Volume Status [ ]Hypervolemic [x]Euvolemic [ ]Hypovolemic  Meds: phenylephrine    Infusion 0.5 MICROgram(s)/kG/Min IV Continuous <Continuous>  doxazosin 2 milliGRAM(s) Oral at bedtime    GI/NUTRITION  Exam: soft, nontender, nondistended, incision C/D/I  Diet: NPO     GENITOURINARY  09-20    141  |  108  |  29<H>  ----------------------------<  104<H>  3.7   |  21<L>  |  0.65    Ca    8.5      20 Sep 2017 03:18  Phos  2.3     09-20  Mg     2.4     09-20      [ x] López catheter, indication:  Meds: cyanocobalamin Injectable 500 MICROGram(s) SubCutaneous daily  pyridoxine Injectable 100 milliGRAM(s) IV Push daily  folic acid Injectable 1 milliGRAM(s) IV Push daily    HEMATOLOGIC  Meds: clopidogrel Tablet 75 milliGRAM(s) Oral daily  aspirin  chewable 81 milliGRAM(s) Oral daily  heparin  Infusion 1000 Unit(s)/Hr IV Continuous <Continuous>    [x] VTE Prophylaxis                        8.6    15.1  )-----------( 273      ( 20 Sep 2017 03:18 )             25.7     PT/INR - ( 20 Sep 2017 03:18 )   PT: 18.5 sec;   INR: 1.68 ratio         PTT - ( 20 Sep 2017 03:18 )  PTT:46.6 sec  Transfusion     [ ] PRBC   [ ] Platelets   [ ] FFP   [ ] Cryoprecipitate      INFECTIOUS DISEASES  WBC Count: 15.1 K/uL (09-20 @ 03:18)    Meds: influenza   Vaccine 0.5 milliLiter(s) IntraMuscular once  cefepime  IVPB 1000 milliGRAM(s) IV Intermittent every 12 hours  metroNIDAZOLE  IVPB 500 milliGRAM(s) IV Intermittent every 8 hours      Meds: methylPREDNISolone sodium succinate Injectable 4 milliGRAM(s) IV Push every 24 hours      [x] Necessity of urinary, arterial, and venous catheters discussed      IMAGING:   from: Xray Esophagram (09.19.17 @ 12:08)   IMPRESSION:     Status post [place] Trent. Findings as described above consistent with a   leak at the distal site just above the hemidiaphragm into the pleural   space.

## 2017-09-20 NOTE — DISCHARGE NOTE ADULT - CARE PROVIDERS DIRECT ADDRESSES
[email or web address]

## 2017-09-21 ENCOUNTER — TRANSCRIPTION ENCOUNTER (OUTPATIENT)
Age: 82
End: 2017-09-21

## 2017-09-21 DIAGNOSIS — J96.90 RESPIRATORY FAILURE, UNSPECIFIED, UNSPECIFIED WHETHER WITH HYPOXIA OR HYPERCAPNIA: ICD-10-CM

## 2017-09-21 DIAGNOSIS — I21.4 NON-ST ELEVATION (NSTEMI) MYOCARDIAL INFARCTION: ICD-10-CM

## 2017-09-21 DIAGNOSIS — C15.9 MALIGNANT NEOPLASM OF ESOPHAGUS, UNSPECIFIED: ICD-10-CM

## 2017-09-21 DIAGNOSIS — J45.909 UNSPECIFIED ASTHMA, UNCOMPLICATED: ICD-10-CM

## 2017-09-21 DIAGNOSIS — J90 PLEURAL EFFUSION, NOT ELSEWHERE CLASSIFIED: ICD-10-CM

## 2017-09-21 DIAGNOSIS — K91.89 OTHER POSTPROCEDURAL COMPLICATIONS AND DISORDERS OF DIGESTIVE SYSTEM: ICD-10-CM

## 2017-09-21 LAB
ALBUMIN SERPL ELPH-MCNC: 1.9 G/DL — LOW (ref 3.3–5)
ALP SERPL-CCNC: 177 U/L — HIGH (ref 40–120)
ALT FLD-CCNC: 134 U/L — HIGH (ref 4–41)
APCR PPP: 2.31 RATIO — SIGNIFICANT CHANGE UP
APTT BLD: 28.2 SEC — SIGNIFICANT CHANGE UP (ref 27.5–37.4)
APTT BLD: 28.2 SEC — SIGNIFICANT CHANGE UP (ref 27.5–37.4)
AST SERPL-CCNC: 153 U/L — HIGH (ref 4–40)
AT III ACT/NOR PPP CHRO: 81 % — SIGNIFICANT CHANGE UP (ref 76–140)
BASE EXCESS BLDA CALC-SCNC: -0.3 MMOL/L — SIGNIFICANT CHANGE UP
BASE EXCESS BLDA CALC-SCNC: -1 MMOL/L — SIGNIFICANT CHANGE UP
BASE EXCESS BLDV CALC-SCNC: 0.4 MMOL/L — SIGNIFICANT CHANGE UP
BILIRUB SERPL-MCNC: 0.5 MG/DL — SIGNIFICANT CHANGE UP (ref 0.2–1.2)
BLD GP AB SCN SERPL QL: NEGATIVE — SIGNIFICANT CHANGE UP
BLOOD GAS VENOUS - CREATININE: 0.57 MG/DL — SIGNIFICANT CHANGE UP (ref 0.5–1.3)
BUN SERPL-MCNC: 23 MG/DL — SIGNIFICANT CHANGE UP (ref 7–23)
BUN SERPL-MCNC: 26 MG/DL — HIGH (ref 7–23)
CALCIUM SERPL-MCNC: 8 MG/DL — LOW (ref 8.4–10.5)
CALCIUM SERPL-MCNC: 8.1 MG/DL — LOW (ref 8.4–10.5)
CHLORIDE BLDV-SCNC: 110 MMOL/L — HIGH (ref 96–108)
CHLORIDE SERPL-SCNC: 108 MMOL/L — HIGH (ref 98–107)
CHLORIDE SERPL-SCNC: 111 MMOL/L — HIGH (ref 98–107)
CO2 SERPL-SCNC: 21 MMOL/L — LOW (ref 22–31)
CO2 SERPL-SCNC: 24 MMOL/L — SIGNIFICANT CHANGE UP (ref 22–31)
CREAT SERPL-MCNC: 0.51 MG/DL — SIGNIFICANT CHANGE UP (ref 0.5–1.3)
CREAT SERPL-MCNC: 0.57 MG/DL — SIGNIFICANT CHANGE UP (ref 0.5–1.3)
CULTURE RESULTS: SIGNIFICANT CHANGE UP
CULTURE RESULTS: SIGNIFICANT CHANGE UP
D DIMER BLD IA.RAPID-MCNC: 2458 NG/ML — SIGNIFICANT CHANGE UP
DRVVT CONFIRM: 43.1 SEC — HIGH (ref 27–41)
DRVVT INTERPRETATION.: NEGATIVE — SIGNIFICANT CHANGE UP
DRVVT SCREEN TO CONFIRM RATIO: 1.14 — SIGNIFICANT CHANGE UP (ref 0–1.2)
FACT VIII ACT/NOR PPP: 251.8 % — HIGH (ref 50–125)
FIBRINOGEN PPP-MCNC: 744.5 MG/DL — HIGH (ref 310–510)
GAS PNL BLDV: 138 MMOL/L — SIGNIFICANT CHANGE UP (ref 136–146)
GLUCOSE BLDV-MCNC: 61 — LOW (ref 70–99)
GLUCOSE SERPL-MCNC: 118 MG/DL — HIGH (ref 70–99)
GLUCOSE SERPL-MCNC: 63 MG/DL — LOW (ref 70–99)
GRAM STN FLD: SIGNIFICANT CHANGE UP
HCO3 BLDA-SCNC: 24 MMOL/L — SIGNIFICANT CHANGE UP (ref 22–26)
HCO3 BLDA-SCNC: 24 MMOL/L — SIGNIFICANT CHANGE UP (ref 22–26)
HCO3 BLDV-SCNC: 24 MMOL/L — SIGNIFICANT CHANGE UP (ref 20–27)
HCT VFR BLD CALC: 26.3 % — LOW (ref 39–50)
HCT VFR BLD CALC: 29.4 % — LOW (ref 39–50)
HCT VFR BLDV CALC: 27.8 % — LOW (ref 39–51)
HCYS SERPL-MCNC: 3 UMOL/L — LOW (ref 5–20)
HGB BLD-MCNC: 10 G/DL — LOW (ref 13–17)
HGB BLD-MCNC: 8.5 G/DL — LOW (ref 13–17)
HGB BLDV-MCNC: 9 G/DL — LOW (ref 13–17)
INR BLD: 1.34 — HIGH (ref 0.88–1.17)
LACTATE BLDV-MCNC: 1.4 MMOL/L — SIGNIFICANT CHANGE UP (ref 0.5–2)
MAGNESIUM SERPL-MCNC: 2.1 MG/DL — SIGNIFICANT CHANGE UP (ref 1.6–2.6)
MAGNESIUM SERPL-MCNC: 2.2 MG/DL — SIGNIFICANT CHANGE UP (ref 1.6–2.6)
MCHC RBC-ENTMCNC: 29.7 PG — SIGNIFICANT CHANGE UP (ref 27–34)
MCHC RBC-ENTMCNC: 30.8 PG — SIGNIFICANT CHANGE UP (ref 27–34)
MCHC RBC-ENTMCNC: 32.3 % — SIGNIFICANT CHANGE UP (ref 32–36)
MCHC RBC-ENTMCNC: 34 % — SIGNIFICANT CHANGE UP (ref 32–36)
MCV RBC AUTO: 90.5 FL — SIGNIFICANT CHANGE UP (ref 80–100)
MCV RBC AUTO: 92 FL — SIGNIFICANT CHANGE UP (ref 80–100)
NORMALIZED SCT PPP-RTO: 0.64 — LOW (ref 0.81–1.17)
NORMALIZED SCT PPP-RTO: 45.4 SEC — SIGNIFICANT CHANGE UP (ref 33.7–49.5)
NORMALIZED SCT PPP-RTO: NEGATIVE — SIGNIFICANT CHANGE UP
NRBC # FLD: 0 — SIGNIFICANT CHANGE UP
NRBC # FLD: 0 — SIGNIFICANT CHANGE UP
PCO2 BLDA: 36 MMHG — SIGNIFICANT CHANGE UP (ref 35–48)
PCO2 BLDA: 40 MMHG — SIGNIFICANT CHANGE UP (ref 35–48)
PCO2 BLDV: 45 MMHG — SIGNIFICANT CHANGE UP (ref 41–51)
PH BLDA: 7.4 PH — SIGNIFICANT CHANGE UP (ref 7.35–7.45)
PH BLDA: 7.42 PH — SIGNIFICANT CHANGE UP (ref 7.35–7.45)
PH BLDV: 7.37 PH — SIGNIFICANT CHANGE UP (ref 7.32–7.43)
PHENYTOIN FREE SERPL-MCNC: 5 UG/ML — LOW (ref 10–20)
PHOSPHATE SERPL-MCNC: 2.8 MG/DL — SIGNIFICANT CHANGE UP (ref 2.5–4.5)
PLATELET # BLD AUTO: 224 K/UL — SIGNIFICANT CHANGE UP (ref 150–400)
PLATELET # BLD AUTO: 241 K/UL — SIGNIFICANT CHANGE UP (ref 150–400)
PMV BLD: 9.3 FL — SIGNIFICANT CHANGE UP (ref 7–13)
PMV BLD: 9.6 FL — SIGNIFICANT CHANGE UP (ref 7–13)
PO2 BLDA: 111 MMHG — HIGH (ref 83–108)
PO2 BLDA: 113 MMHG — HIGH (ref 83–108)
PO2 BLDV: 45 MMHG — HIGH (ref 35–40)
POTASSIUM BLDV-SCNC: 3.6 MMOL/L — SIGNIFICANT CHANGE UP (ref 3.4–4.5)
POTASSIUM SERPL-MCNC: 3.8 MMOL/L — SIGNIFICANT CHANGE UP (ref 3.5–5.3)
POTASSIUM SERPL-MCNC: 3.8 MMOL/L — SIGNIFICANT CHANGE UP (ref 3.5–5.3)
POTASSIUM SERPL-SCNC: 3.8 MMOL/L — SIGNIFICANT CHANGE UP (ref 3.5–5.3)
POTASSIUM SERPL-SCNC: 3.8 MMOL/L — SIGNIFICANT CHANGE UP (ref 3.5–5.3)
PROT C ACT/NOR PPP: 86 % — SIGNIFICANT CHANGE UP (ref 74–150)
PROT S FREE AG PPP IA-ACNC: 33.3 % — LOW (ref 67–141)
PROT SERPL-MCNC: 5.7 G/DL — LOW (ref 6–8.3)
PROTHROM AB SERPL-ACNC: 15.1 SEC — HIGH (ref 9.8–13.1)
RBC # BLD: 2.86 M/UL — LOW (ref 4.2–5.8)
RBC # BLD: 3.25 M/UL — LOW (ref 4.2–5.8)
RBC # FLD: 15.4 % — HIGH (ref 10.3–14.5)
RBC # FLD: 15.4 % — HIGH (ref 10.3–14.5)
RH IG SCN BLD-IMP: POSITIVE — SIGNIFICANT CHANGE UP
SAO2 % BLDA: 98.8 % — SIGNIFICANT CHANGE UP (ref 95–99)
SAO2 % BLDA: 99.2 % — HIGH (ref 95–99)
SAO2 % BLDV: 80 % — SIGNIFICANT CHANGE UP (ref 60–85)
SODIUM SERPL-SCNC: 142 MMOL/L — SIGNIFICANT CHANGE UP (ref 135–145)
SODIUM SERPL-SCNC: 143 MMOL/L — SIGNIFICANT CHANGE UP (ref 135–145)
SPECIMEN SOURCE: SIGNIFICANT CHANGE UP
THROMBIN TIME: 24.3 SEC — SIGNIFICANT CHANGE UP (ref 17–26)
VANCOMYCIN FLD-MCNC: 12.7 UG/ML — SIGNIFICANT CHANGE UP
WBC # BLD: 10.17 K/UL — SIGNIFICANT CHANGE UP (ref 3.8–10.5)
WBC # BLD: 9.41 K/UL — SIGNIFICANT CHANGE UP (ref 3.8–10.5)
WBC # FLD AUTO: 10.17 K/UL — SIGNIFICANT CHANGE UP (ref 3.8–10.5)
WBC # FLD AUTO: 9.41 K/UL — SIGNIFICANT CHANGE UP (ref 3.8–10.5)

## 2017-09-21 PROCEDURE — 71010: CPT | Mod: 26,77

## 2017-09-21 PROCEDURE — 32557 INSERT CATH PLEURA W/ IMAGE: CPT

## 2017-09-21 PROCEDURE — 71010: CPT | Mod: 26

## 2017-09-21 PROCEDURE — 71260 CT THORAX DX C+: CPT | Mod: 26

## 2017-09-21 PROCEDURE — 43235 EGD DIAGNOSTIC BRUSH WASH: CPT | Mod: 58

## 2017-09-21 PROCEDURE — 99292 CRITICAL CARE ADDL 30 MIN: CPT

## 2017-09-21 PROCEDURE — 99223 1ST HOSP IP/OBS HIGH 75: CPT

## 2017-09-21 PROCEDURE — 99291 CRITICAL CARE FIRST HOUR: CPT

## 2017-09-21 PROCEDURE — 74177 CT ABD & PELVIS W/CONTRAST: CPT | Mod: 26

## 2017-09-21 RX ORDER — MICAFUNGIN SODIUM 100 MG/1
100 INJECTION, POWDER, LYOPHILIZED, FOR SOLUTION INTRAVENOUS ONCE
Qty: 0 | Refills: 0 | Status: COMPLETED | OUTPATIENT
Start: 2017-09-21 | End: 2017-09-21

## 2017-09-21 RX ORDER — FLUTICASONE PROPIONATE 220 MCG
1 AEROSOL WITH ADAPTER (GRAM) INHALATION
Qty: 0 | Refills: 0 | Status: DISCONTINUED | OUTPATIENT
Start: 2017-09-21 | End: 2017-10-04

## 2017-09-21 RX ORDER — MICAFUNGIN SODIUM 100 MG/1
INJECTION, POWDER, LYOPHILIZED, FOR SOLUTION INTRAVENOUS
Qty: 0 | Refills: 0 | Status: DISCONTINUED | OUTPATIENT
Start: 2017-09-21 | End: 2017-09-28

## 2017-09-21 RX ORDER — ALBUTEROL 90 UG/1
2 AEROSOL, METERED ORAL EVERY 6 HOURS
Qty: 0 | Refills: 0 | Status: DISCONTINUED | OUTPATIENT
Start: 2017-09-21 | End: 2017-09-23

## 2017-09-21 RX ORDER — POTASSIUM PHOSPHATE, MONOBASIC POTASSIUM PHOSPHATE, DIBASIC 236; 224 MG/ML; MG/ML
15 INJECTION, SOLUTION INTRAVENOUS ONCE
Qty: 0 | Refills: 0 | Status: COMPLETED | OUTPATIENT
Start: 2017-09-21 | End: 2017-09-21

## 2017-09-21 RX ORDER — FENTANYL CITRATE 50 UG/ML
1 INJECTION INTRAVENOUS
Qty: 2500 | Refills: 0 | Status: DISCONTINUED | OUTPATIENT
Start: 2017-09-21 | End: 2017-09-23

## 2017-09-21 RX ORDER — SODIUM CHLORIDE 9 MG/ML
1000 INJECTION INTRAMUSCULAR; INTRAVENOUS; SUBCUTANEOUS
Qty: 0 | Refills: 0 | Status: DISCONTINUED | OUTPATIENT
Start: 2017-09-21 | End: 2017-09-21

## 2017-09-21 RX ORDER — PANTOPRAZOLE SODIUM 20 MG/1
40 TABLET, DELAYED RELEASE ORAL
Qty: 0 | Refills: 0 | Status: DISCONTINUED | OUTPATIENT
Start: 2017-09-21 | End: 2017-10-02

## 2017-09-21 RX ORDER — FLUTICASONE PROPIONATE 220 MCG
2 AEROSOL WITH ADAPTER (GRAM) INHALATION
Qty: 0 | Refills: 0 | Status: DISCONTINUED | OUTPATIENT
Start: 2017-09-21 | End: 2017-09-21

## 2017-09-21 RX ORDER — ALBUMIN HUMAN 25 %
250 VIAL (ML) INTRAVENOUS ONCE
Qty: 0 | Refills: 0 | Status: COMPLETED | OUTPATIENT
Start: 2017-09-21 | End: 2017-09-21

## 2017-09-21 RX ORDER — IPRATROPIUM BROMIDE 0.2 MG/ML
1 SOLUTION, NON-ORAL INHALATION EVERY 6 HOURS
Qty: 0 | Refills: 0 | Status: DISCONTINUED | OUTPATIENT
Start: 2017-09-21 | End: 2017-09-23

## 2017-09-21 RX ORDER — SODIUM CHLORIDE 9 MG/ML
1000 INJECTION, SOLUTION INTRAVENOUS
Qty: 0 | Refills: 0 | Status: DISCONTINUED | OUTPATIENT
Start: 2017-09-21 | End: 2017-09-22

## 2017-09-21 RX ORDER — MICAFUNGIN SODIUM 100 MG/1
100 INJECTION, POWDER, LYOPHILIZED, FOR SOLUTION INTRAVENOUS EVERY 24 HOURS
Qty: 0 | Refills: 0 | Status: DISCONTINUED | OUTPATIENT
Start: 2017-09-22 | End: 2017-09-28

## 2017-09-21 RX ADMIN — PROPOFOL 2.17 MICROGRAM(S)/KG/MIN: 10 INJECTION, EMULSION INTRAVENOUS at 02:40

## 2017-09-21 RX ADMIN — PHENYLEPHRINE HYDROCHLORIDE 8.12 MICROGRAM(S)/KG/MIN: 10 INJECTION INTRAVENOUS at 19:36

## 2017-09-21 RX ADMIN — Medication 1 PUFF(S): at 16:26

## 2017-09-21 RX ADMIN — CHLORHEXIDINE GLUCONATE 15 MILLILITER(S): 213 SOLUTION TOPICAL at 09:33

## 2017-09-21 RX ADMIN — MICAFUNGIN SODIUM 105 MILLIGRAM(S): 100 INJECTION, POWDER, LYOPHILIZED, FOR SOLUTION INTRAVENOUS at 15:23

## 2017-09-21 RX ADMIN — ALBUTEROL 2 PUFF(S): 90 AEROSOL, METERED ORAL at 22:52

## 2017-09-21 RX ADMIN — PROPOFOL 2.17 MICROGRAM(S)/KG/MIN: 10 INJECTION, EMULSION INTRAVENOUS at 07:00

## 2017-09-21 RX ADMIN — PREGABALIN 500 MICROGRAM(S): 225 CAPSULE ORAL at 20:25

## 2017-09-21 RX ADMIN — Medication 1 PUFF(S): at 22:52

## 2017-09-21 RX ADMIN — Medication 150 MILLIGRAM(S): at 06:00

## 2017-09-21 RX ADMIN — CEFEPIME 100 MILLIGRAM(S): 1 INJECTION, POWDER, FOR SOLUTION INTRAMUSCULAR; INTRAVENOUS at 18:05

## 2017-09-21 RX ADMIN — Medication 250 MILLILITER(S): at 20:24

## 2017-09-21 RX ADMIN — FENTANYL CITRATE 7.22 MICROGRAM(S)/KG/HR: 50 INJECTION INTRAVENOUS at 07:00

## 2017-09-21 RX ADMIN — CHLORHEXIDINE GLUCONATE 1 APPLICATION(S): 213 SOLUTION TOPICAL at 18:05

## 2017-09-21 RX ADMIN — Medication 100 MILLIGRAM(S): at 20:24

## 2017-09-21 RX ADMIN — PHENYLEPHRINE HYDROCHLORIDE 8.12 MICROGRAM(S)/KG/MIN: 10 INJECTION INTRAVENOUS at 07:00

## 2017-09-21 RX ADMIN — SODIUM CHLORIDE 100 MILLILITER(S): 9 INJECTION, SOLUTION INTRAVENOUS at 02:41

## 2017-09-21 RX ADMIN — FOSPHENYTOIN 108 MILLIGRAM(S) PE: 50 INJECTION INTRAMUSCULAR; INTRAVENOUS at 21:09

## 2017-09-21 RX ADMIN — Medication 100 MILLIGRAM(S): at 05:40

## 2017-09-21 RX ADMIN — Medication 20 MILLIGRAM(S): at 19:35

## 2017-09-21 RX ADMIN — CHLORHEXIDINE GLUCONATE 15 MILLILITER(S): 213 SOLUTION TOPICAL at 01:22

## 2017-09-21 RX ADMIN — POTASSIUM PHOSPHATE, MONOBASIC POTASSIUM PHOSPHATE, DIBASIC 62.5 MILLIMOLE(S): 236; 224 INJECTION, SOLUTION INTRAVENOUS at 02:41

## 2017-09-21 RX ADMIN — FENTANYL CITRATE 7.22 MICROGRAM(S)/KG/HR: 50 INJECTION INTRAVENOUS at 19:36

## 2017-09-21 RX ADMIN — FENTANYL CITRATE 7.22 MICROGRAM(S)/KG/HR: 50 INJECTION INTRAVENOUS at 02:40

## 2017-09-21 RX ADMIN — SODIUM CHLORIDE 100 MILLILITER(S): 9 INJECTION, SOLUTION INTRAVENOUS at 07:00

## 2017-09-21 RX ADMIN — PANTOPRAZOLE SODIUM 40 MILLIGRAM(S): 20 TABLET, DELAYED RELEASE ORAL at 19:35

## 2017-09-21 RX ADMIN — Medication 150 MILLIGRAM(S): at 20:24

## 2017-09-21 RX ADMIN — FENTANYL CITRATE 1 MICROGRAM(S)/KG/HR: 50 INJECTION INTRAVENOUS at 06:43

## 2017-09-21 RX ADMIN — PHENYLEPHRINE HYDROCHLORIDE 8.12 MICROGRAM(S)/KG/MIN: 10 INJECTION INTRAVENOUS at 02:40

## 2017-09-21 RX ADMIN — FOSPHENYTOIN 108 MILLIGRAM(S) PE: 50 INJECTION INTRAMUSCULAR; INTRAVENOUS at 06:41

## 2017-09-21 RX ADMIN — Medication 100 MILLIGRAM(S): at 16:00

## 2017-09-21 RX ADMIN — CEFEPIME 100 MILLIGRAM(S): 1 INJECTION, POWDER, FOR SOLUTION INTRAMUSCULAR; INTRAVENOUS at 06:18

## 2017-09-21 RX ADMIN — ALBUTEROL 2 PUFF(S): 90 AEROSOL, METERED ORAL at 16:26

## 2017-09-21 RX ADMIN — PROPOFOL 2.17 MICROGRAM(S)/KG/MIN: 10 INJECTION, EMULSION INTRAVENOUS at 19:36

## 2017-09-21 RX ADMIN — SODIUM CHLORIDE 100 MILLILITER(S): 9 INJECTION, SOLUTION INTRAVENOUS at 19:37

## 2017-09-21 RX ADMIN — PANTOPRAZOLE SODIUM 40 MILLIGRAM(S): 20 TABLET, DELAYED RELEASE ORAL at 05:40

## 2017-09-21 RX ADMIN — Medication 100 MILLIGRAM(S): at 22:48

## 2017-09-21 RX ADMIN — CHLORHEXIDINE GLUCONATE 15 MILLILITER(S): 213 SOLUTION TOPICAL at 20:24

## 2017-09-21 RX ADMIN — Medication 1 MILLIGRAM(S): at 18:03

## 2017-09-21 RX ADMIN — Medication 125 MILLIGRAM(S): at 01:22

## 2017-09-21 NOTE — CONSULT NOTE ADULT - SUBJECTIVE AND OBJECTIVE BOX
HPI:  82M esophageal ca s/p chemo/RT and then robotic-assisted Vance-Trent esophagogastrectomy with feeding J tube placement for esophageal adenocarcinoma (T1aN0) on 8/14/17.  On 8/21/17, it was noted that he had leukocytosis.  Work-up revealed an esophageal leak.  This was treated conservatively with antimicrobials and J-tube feeds.  He was discharged on 8/18/17 to rehab and returned 9/15/17 with a clogged J-tube and SOB.  A repeat esophagram 9/19/17 again showed a leak at the distal site just above the hemidiaphragm into the pleural space.   CT confirmed a left pleural effusion and loculated R effusion. Pleural fluid was sent for culture and was negative.  He was placed on vancomycin, cefepime and flagyl.  Cdiff was positive.  He was referred to [facility] 9/20/17 for further management.  Here he underwent EGD that revealed distal esophageal perforation and gastric perforation. Left CT was placed.  Remains intubated.     PAST MEDICAL & SURGICAL HISTORY:  Coronary artery disease: s/p 3 stents on June 30, 2017 at [place] Dr. Lavelle Reid  OA (osteoarthritis) of knee: right  Rheumatoid arthritis  Seizure disorder: 1 episode approximately 15 yrs ago  Asthma  Hyperlipidemia  BPH  HTN  Esophagus cancer  Chronic allergic rhinitis  BCC (basal cell carcinoma): skin  Cerebrovascular accident (CVA)  Anxiety  Anastomotic leak following esophagectomy: Vance trent esophagectomy  History of tonsillectomy  H/O heart artery stent: June 30, 2017  Knee arthropathy: left  History of total hip replacement: left  History of hernia repair    Allergies  penicillin (Rash)    ANTIMICROBIALS:    cefepime  IVPB 1000 every 12 hours - started 9/15-  vancomycin  IVPB 750 every 12 hours - started 9/15-  vancomycin    Solution 125 every 6 hours - started   metroNIDAZOLE  IVPB 500 every 8 hours - started 9/15-  micafungin IVPB - started 9/21    OTHER MEDS: MEDICATIONS  (STANDING):  aspirin  chewable 81 daily  clopidogrel Tablet 75 daily  phenylephrine    Infusion 0.3 <Continuous>  fosphenytoin IVPB 200 every 12 hours  propofol Infusion 5 <Continuous>  fentaNYL   Infusion 1 <Continuous>  pantoprazole  Injectable 40 two times a day  methylPREDNISolone sodium succinate Injectable 20 two times a day  ALBUTerol    90 MICROgram(s) HFA Inhaler 2 every 6 hours  ipratropium 17 MICROgram(s) HFA Inhaler 1 every 6 hours  fluticasone propionate   110 MICROgram(s) HFA Inhaler 2 two times a day    SOCIAL HISTORY:  [ x] former smoker    FAMILY HISTORY:  Family history of heart attack (Sibling)  Family history of pulmonary embolism: father @ 49 yr old  FH: CAD (coronary artery disease)    REVIEW OF SYSTEMS  [ x ] ROS unobtainable because:  patient is intubaed  [  ] All other systems negative except as noted below:	    Constitutional:  [ ] fever [ ] weight loss  Skin:  [ ] rash [ ] phlebitis	  Eyes: [ ] icterus [ ] inflammation	  ENMT: [ ] discharge [ ] thrush [ ] ulcers [ ] exudates  Respiratory: [ ] dyspnea [ ] hemoptysis [ ] cough [ ] sputum	  Cardiovascular:  [ ] chest pain [ ] palpitations [ ] edema	  Gastrointestinal:  [ ] nausea [ ] vomiting [ ] diarrhea [ ] constipation [ ] pain	  Genitourinary:  [ ] dysuria [ ] frequency [ ] hematuria [ ] discharge [ ] flank pain  Musculoskeletal:  [ ] myalgias [ ] arthralgias [ ] arthritis	  Neurological:  [ ] headache [ ] seizures	  Psychiatric:  [ ] anxiety [ ] depression	  Hematology/Lymphatics:  [ ] lymphadenopathy  Endocrine:  [ ] adrenal [ ] thyroid  Allergic/Immunologic:	 [ ] transplant [ ] seasonal    Vital Signs Last 24 Hrs  T(F): 97 (09-21-17 @ 14:00), Max: 98.6 (09-18-17 @ 23:15)  HR: 67 (09-21-17 @ 16:00) (64 - 85)  BP: 120/62 (09-21-17 @ 15:00) (79/46 - 123/67)  RR: 16 (09-21-17 @ 16:00)  SpO2: 97% (09-21-17 @ 16:00) (96% - 100%)  Wt(kg): --    PHYSICAL EXAM:  General: non-toxic; intubated  HEAD/EYES: eyes are closed  ENT:  supple  Cardiovascular:   S1, S2  Respiratory:  clear bilaterally; R GINA with purulent fluid; L CT no drainage  GI:  soft, non-tender, normal bowel sounds  :  garza  Musculoskeletal:  no synovitis  Neurologic:  sedated  Skin:  no rash  Lymph: no lymphadenopathy  Psychiatric:  sedated  Vascular:  L IJ                        10.0   9.41  )-----------( 224      ( 21 Sep 2017 06:55 )             29.4     143  |  111<H>  |  23  ----------------------------<  118<H>  3.8   |  21<L>  |  0.51    Ca    8.1<L>      21 Sep 2017 06:55  Phos  2.8     09-21  Mg     2.1     09-21    TPro  5.7<L>  /  Alb  1.9<L>  /  TBili  0.5  /  DBili  x   /  AST  153<H>  /  ALT  134<H>  /  AlkPhos  177<H>  09-21    Vancomycin Level, Random: 12.7 ug/mL (09-21-17 @ 04:55)    MICROBIOLOGY:  C Diff by PCR Result: NotDetec (09.20.17 @ 07:34)    Specimen Source: .Blood Blood (09.18.17 @ 08:34)    Culture - Body Fluid with Gram Stain (09.17.17 @ 12:42)  Pleural Fl Other, Pleural Fluid  No growth to date.    Specimen Source: .Blood Blood-Venous (09.16.17 @ 15:30)  Culture Results:   No growth at 5 days. (09.16.17 @ 15:30)    Culture - Body Fluid with Gram Stain (09.16.17 @ 00:58)  .Body Fluid Pleural Fluid    Culture Results:   No growth at 5 days    Specimen Source: .Blood Blood-Venous (09.15.17 @ 07:47)  Negative    Culture - Respiratory with Gram Stain (08.20.17 @ 16:47)    Gram Stain Sputum:     Specimen Source: SPUTUM    Organism Identification: Stenotrophomonas maltophilia    Organism: Stenotrophomonas maltophilia    RADIOLOGY:  CT Abdomen and Pelvis w/ IV Cont (09.21.17 @ 12:50)   IMPRESSION:   Status post esophagectomy and gastric pull-through with adjacent extraluminal contrast consistent with known leak.  Tracheostomy tube in place.  Moderate sized pleural effusions with loculations.  Evidence of pericarditis with small to moderate-sized pericardial effusion.  J-tube in place.  Small volume ascites. Anasarca.    Xray Esophagram (09.19.17 @ 12:08)   IMPRESSION:  Status post Vance Trent. Findings as described above consistent with a leak at the distal site just above the hemidiaphragm into the pleural space. HPI:  82M esophageal ca s/p chemo/RT and then robotic-assisted Vance-Trent esophagogastrectomy with feeding J tube placement for esophageal adenocarcinoma (T1aN0) on 8/14/17.  On 8/21/17, it was noted that he had leukocytosis.  Work-up revealed an esophageal leak.  This was treated conservatively with antimicrobials (vanco, cefepime, flagyl 8/20/17-8/23/17) and J-tube feeds.  He was discharged on 8/18/17 to rehab and returned 9/15/17 with a clogged J-tube and SOB.  A repeat esophagram 9/19/17 again showed a leak at the distal site just above the hemidiaphragm into the pleural space.   CT confirmed a left pleural effusion and loculated R effusion. Pleural fluid was sent for culture and was negative.  He was placed on vancomycin, cefepime and flagyl.  NSTEMI and brief SICU stay until he was referred to [facility] 9/20/17 for further management.  Here he underwent EGD that revealed distal esophageal perforation and gastric perforation. Left CT was placed.  Remains intubated.     PAST MEDICAL & SURGICAL HISTORY:  Coronary artery disease: s/p 3 stents on June 30, 2017 at [place] Dr. Lavelle Reid  OA (osteoarthritis) of knee: right  Rheumatoid arthritis  Seizure disorder: 1 episode approximately 15 yrs ago  Asthma  Hyperlipidemia  BPH  HTN  Esophagus cancer  Chronic allergic rhinitis  BCC (basal cell carcinoma): skin  Cerebrovascular accident (CVA)  Anxiety  Anastomotic leak following esophagectomy: Vance trent esophagectomy  History of tonsillectomy  H/O heart artery stent: June 30, 2017  Knee arthropathy: left  History of total hip replacement: left  History of hernia repair    Allergies  penicillin (Rash)    ANTIMICROBIALS:    cefepime  IVPB 1000 every 12 hours - started 9/15-  vancomycin  IVPB 750 every 12 hours - started 9/15-  vancomycin    Solution 125 every 6 hours - started   metroNIDAZOLE  IVPB 500 every 8 hours - started 9/15-  micafungin IVPB - started 9/21    OTHER MEDS: MEDICATIONS  (STANDING):  aspirin  chewable 81 daily  clopidogrel Tablet 75 daily  phenylephrine    Infusion 0.3 <Continuous>  fosphenytoin IVPB 200 every 12 hours  propofol Infusion 5 <Continuous>  fentaNYL   Infusion 1 <Continuous>  pantoprazole  Injectable 40 two times a day  methylPREDNISolone sodium succinate Injectable 20 two times a day  ALBUTerol    90 MICROgram(s) HFA Inhaler 2 every 6 hours  ipratropium 17 MICROgram(s) HFA Inhaler 1 every 6 hours  fluticasone propionate   110 MICROgram(s) HFA Inhaler 2 two times a day    SOCIAL HISTORY:  [ x] former smoker    FAMILY HISTORY:  Family history of heart attack (Sibling)  Family history of pulmonary embolism: father @ 49 yr old  FH: CAD (coronary artery disease)    REVIEW OF SYSTEMS  [ x ] ROS unobtainable because:  patient is intubaed  [  ] All other systems negative except as noted below:	    Constitutional:  [ ] fever [ ] weight loss  Skin:  [ ] rash [ ] phlebitis	  Eyes: [ ] icterus [ ] inflammation	  ENMT: [ ] discharge [ ] thrush [ ] ulcers [ ] exudates  Respiratory: [ ] dyspnea [ ] hemoptysis [ ] cough [ ] sputum	  Cardiovascular:  [ ] chest pain [ ] palpitations [ ] edema	  Gastrointestinal:  [ ] nausea [ ] vomiting [ ] diarrhea [ ] constipation [ ] pain	  Genitourinary:  [ ] dysuria [ ] frequency [ ] hematuria [ ] discharge [ ] flank pain  Musculoskeletal:  [ ] myalgias [ ] arthralgias [ ] arthritis	  Neurological:  [ ] headache [ ] seizures	  Psychiatric:  [ ] anxiety [ ] depression	  Hematology/Lymphatics:  [ ] lymphadenopathy  Endocrine:  [ ] adrenal [ ] thyroid  Allergic/Immunologic:	 [ ] transplant [ ] seasonal    Vital Signs Last 24 Hrs  T(F): 97 (09-21-17 @ 14:00), Max: 98.6 (09-18-17 @ 23:15)  HR: 67 (09-21-17 @ 16:00) (64 - 85)  BP: 120/62 (09-21-17 @ 15:00) (79/46 - 123/67)  RR: 16 (09-21-17 @ 16:00)  SpO2: 97% (09-21-17 @ 16:00) (96% - 100%)  Wt(kg): --    PHYSICAL EXAM:  General: non-toxic; intubated  HEAD/EYES: eyes are closed  ENT:  supple  Cardiovascular:   S1, S2  Respiratory:  clear bilaterally; R GINA with purulent fluid; L CT no drainage  GI:  soft, non-tender, normal bowel sounds  :  garza  Musculoskeletal:  no synovitis  Neurologic:  sedated  Skin:  no rash  Lymph: no lymphadenopathy  Psychiatric:  sedated  Vascular:  L IJ                        10.0   9.41  )-----------( 224      ( 21 Sep 2017 06:55 )             29.4     143  |  111<H>  |  23  ----------------------------<  118<H>  3.8   |  21<L>  |  0.51    Ca    8.1<L>      21 Sep 2017 06:55  Phos  2.8     09-21  Mg     2.1     09-21    TPro  5.7<L>  /  Alb  1.9<L>  /  TBili  0.5  /  DBili  x   /  AST  153<H>  /  ALT  134<H>  /  AlkPhos  177<H>  09-21    Vancomycin Level, Random: 12.7 ug/mL (09-21-17 @ 04:55)    MICROBIOLOGY:  C Diff by PCR Result: NotDetec (09.20.17 @ 07:34)    Specimen Source: .Blood Blood (09.18.17 @ 08:34)    Culture - Body Fluid with Gram Stain (09.17.17 @ 12:42)  Pleural Fl Other, Pleural Fluid  No growth to date.    Specimen Source: .Blood Blood-Venous (09.16.17 @ 15:30)  Culture Results:   No growth at 5 days. (09.16.17 @ 15:30)    Culture - Body Fluid with Gram Stain (09.16.17 @ 00:58)  .Body Fluid Pleural Fluid    Culture Results:   No growth at 5 days    Specimen Source: .Blood Blood-Venous (09.15.17 @ 07:47)  Negative    Culture - Respiratory with Gram Stain (08.20.17 @ 16:47)    Gram Stain Sputum:     Specimen Source: SPUTUM    Organism Identification: Stenotrophomonas maltophilia    Organism: Stenotrophomonas maltophilia    RADIOLOGY:  CT Abdomen and Pelvis w/ IV Cont (09.21.17 @ 12:50)   IMPRESSION:   Status post esophagectomy and gastric pull-through with adjacent extraluminal contrast consistent with known leak.  Tracheostomy tube in place.  Moderate sized pleural effusions with loculations.  Evidence of pericarditis with small to moderate-sized pericardial effusion.  J-tube in place.  Small volume ascites. Anasarca.    Xray Esophagram (09.19.17 @ 12:08)   IMPRESSION:  Status post [place] Trent. Findings as described above consistent with a leak at the distal site just above the hemidiaphragm into the pleural space.       Transthoracic Echocardiogram (09.18.17 @ 10:15) >  CONCLUSIONS:  1. Mitral annular calcification and calcified mitral leaflets with normal diastolic opening.  2. Calcified aortic valve with decreased opening.  3. Low Normal left ventricular systolic function. EF 50%  4. The right ventricle is not well visualized; grossly normal right ventricular systolic function.  5. Thickened  pericardium with small  0.9cm circumferential pericardial effusion. There is evidence of raised intra pericardial pressure with RA buckling however no  tamponade seen.

## 2017-09-21 NOTE — PROCEDURE NOTE - NSSITEPREP_SKIN_A_CORE
chlorhexidine/povidone iodine (if allergic to chlorhexidine)/Adherence to aseptic technique: hand hygiene prior to donning barriers (gown, gloves), don cap and mask, sterile drape over patient
chlorhexidine

## 2017-09-21 NOTE — BRIEF OPERATIVE NOTE - OPERATION/FINDINGS
Perforation in esophagus at approximately 28cm from incisors.   Perforation in stomach at approximately 40cm from incisors.

## 2017-09-21 NOTE — BRIEF OPERATIVE NOTE - PROCEDURE
<<-----Click on this checkbox to enter Procedure EGD (esophagogastroduodenoscopy)  09/21/2017    Active  CSUMMERS

## 2017-09-21 NOTE — CONSULT NOTE ADULT - PROBLEM SELECTOR RECOMMENDATION 2
Left pleural effusion= exudate  consider sending fluid for cytology. no infection in L pleural space, amylase=32    R pleural effusion- consider send fluid for pleural fluid analysis, cx  concern for developing new pna, pna with parapneumonic effsion, empyema-doubt  await ct chest  c/w abx  f/u all cx

## 2017-09-21 NOTE — CONSULT NOTE ADULT - SUBJECTIVE AND OBJECTIVE BOX
PULMONARY CONSULT    Initial HPI on admission:  HPI:  82 M presents with non-functioning J tube and SOB. 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 Dyer and Dr. Christian Weinstein on 8/14/17. After surgery, patient was found to have an anastomotic leak and was treated conservatively with NPO, antibiotics, chest tube drainage, and J tube feeding. Patient was eventually discharged to rehab. On day of evaluation, patient's J tube was unable to be used, and was sent to the ED for evaluation. Patient also complains of SOB, and reported that R pleural drainage tube was malfunctioning and draining dark murky fluid. CT chest was done = large left effusion increased and stable Right effsuion with drain-unchanged. S/p Ir pigtail cath place to Left pleural effusion= exudate effusion-cx negative, amylase 32. Pt with leukocytosis tx empericly with flagyl, vanco and cefepime. Found to have indeterminante c.diff-tx with exteric vanco. Pt had been c/o coung, yesterday at its worst point, with acute bronchospasm on exam-intubated for hypercarbic resp failure and increased work of breathing. Initially tx with steroids for bronchospams but dc after pt was intubated when lung sounds were normal ? secretions. Pt had esophagram 9/20 which confirmed leak on R into pleural space. Pt with increasing Right side effusion with surrounding opacity-?worsening pna, ?pna with parapneumonic effusion or empyema-doubt. Concern was for new developing infection in Right lung with Right pleural drain being nidis of infection. Also h/o cad recent stents + NSTEMI- on heparin. Pt seen in cticu, intubated, on pressors, sedation..in process of going for ct c/a/p -then eventual eval of esophageal leak. Difficult to obtain [id number]-goijntn-ygilawqrh, but comfortable on prvc, no secreations. O/W ros neg          PAST MEDICAL & SURGICAL HISTORY:  Coronary artery disease: s/p 3 stents on June 30, 2017 at [place] Dr. Lavelle Reid  OA (osteoarthritis) of knee: right  Former smoker, stopped smoking in distant past  Rheumatoid arthritis  Seizure disorder: 1 episode approximately 15 yrs ago  Asthma  Hyperlipidemia  BPH (benign prostatic hyperplasia)  HTN (hypertension)  Esophagus cancer  Chronic allergic rhinitis  BCC (basal cell carcinoma): skin  Cerebrovascular accident (CVA)  Anxiety  Anastomotic leak following esophagectomy: Vance trent esophagectomy  History of tonsillectomy  H/O heart artery stent: June 30, 2017  Knee arthropathy: left  History of total hip replacement: left  History of hernia repair    Allergies    penicillin (Rash)    Intolerances      FAMILY HISTORY:  Family history of heart attack (Sibling)  Family history of pulmonary embolism: father @ 49 yr old  FH: CAD (coronary artery disease)    Social history: former smoker    Review of Systems: unable intubated/sedated  CONSTITUTIONAL:   EYES:   ENMT:    NECK:   RESPIRATORY: Per above  CARDIOVASCULAR:   GASTROINTESTINAL:    GENITOURINARY:   NEUROLOGICAL:   SKIN:   MUSCULOSKELETAL:   PSYCHIATRIC:       Medications:  MEDICATIONS  (STANDING):  cefepime  IVPB 1000 milliGRAM(s) IV Intermittent every 12 hours  vancomycin  IVPB 750 milliGRAM(s) IV Intermittent every 12 hours  metroNIDAZOLE  IVPB      vancomycin    Solution 125 milliGRAM(s) Enteral Tube every 6 hours  ALBUTerol/ipratropium for Nebulization 3 milliLiter(s) Nebulizer every 6 hours  buDESOnide   90 MICROgram(s) Inhaler 1 Puff(s) Inhalation every 12 hours  aspirin  chewable 81 milliGRAM(s) Enteral Tube daily  clopidogrel Tablet 75 milliGRAM(s) Oral daily  phenylephrine    Infusion 0.3 MICROgram(s)/kG/Min (8.122 mL/Hr) IV Continuous <Continuous>  metroNIDAZOLE  IVPB 500 milliGRAM(s) IV Intermittent every 8 hours  chlorhexidine 0.12% Liquid 15 milliLiter(s) Swish and Spit five times a day  chlorhexidine 4% Liquid 1 Application(s) Topical daily  fosphenytoin IVPB 200 milliGRAM(s) PE IV Intermittent every 12 hours  folic acid Injectable 1 milliGRAM(s) IV Push daily  pyridoxine Injectable 100 milliGRAM(s) IV Push daily  cyanocobalamin Injectable 500 MICROGram(s) SubCutaneous daily  propofol Infusion 5 MICROgram(s)/kG/Min (2.166 mL/Hr) IV Continuous <Continuous>  fentaNYL   Infusion 1 MICROgram(s)/kG/Hr (7.22 mL/Hr) IV Continuous <Continuous>  dextrose 5% + sodium chloride 0.9%. 1000 milliLiter(s) (100 mL/Hr) IV Continuous <Continuous>  pantoprazole  Injectable 40 milliGRAM(s) IV Push two times a day  methylPREDNISolone sodium succinate Injectable 20 milliGRAM(s) IV Push two times a day    MEDICATIONS  (PRN):        Mode: AC/ CMV (Assist Control/ Continuous Mandatory Ventilation)  RR (machine): 16  TV (machine): 450  FiO2: 30  PEEP: 5  MAP: 9  PIP: 24      Vital Signs Last 24 Hrs  T(C): 36 (21 Sep 2017 10:00), Max: 36.7 (20 Sep 2017 19:00)  T(F): 96.8 (21 Sep 2017 10:00), Max: 98 (20 Sep 2017 19:00)  HR: 68 (21 Sep 2017 11:00) (64 - 100)  BP: 91/52 (21 Sep 2017 11:00) (79/46 - 126/54)  BP(mean): 61 (21 Sep 2017 11:00) (53 - 89)  RR: 16 (21 Sep 2017 11:00) (13 - 34)  SpO2: 97% (21 Sep 2017 11:00) (96% - 100%)    ABG - ( 21 Sep 2017 06:55 )  pH: 7.42  /  pCO2: 36    /  pO2: 113   / HCO3: 24    / Base Excess: -1.0  /  SaO2: 98.8              VBG pH 7.37 09-21 @ 00:15  VBG pCO2 45 09-21 @ 00:15  VBG O2 sat 80.0 09-21 @ 00:15  VBG lactate 1.4 09-21 @ 00:15        09-20 @ 07:01  -  09-21 @ 07:00  --------------------------------------------------------  IN: 897.6 mL / OUT: 360 mL / NET: 537.6 mL          LABS:                        10.0   9.41  )-----------( 224      ( 21 Sep 2017 06:55 )             29.4     09-21    143  |  111<H>  |  23  ----------------------------<  118<H>  3.8   |  21<L>  |  0.51    Ca    8.1<L>      21 Sep 2017 06:55  Phos  2.8     09-21  Mg     2.1     09-21    TPro  5.7<L>  /  Alb  1.9<L>  /  TBili  0.5  /  DBili  x   /  AST  153<H>  /  ALT  134<H>  /  AlkPhos  177<H>  09-21      CARDIAC MARKERS ( 20 Sep 2017 20:43 )  x     / 0.34 ng/mL / 57 U/L / x     / 5.7 ng/mL  CARDIAC MARKERS ( 20 Sep 2017 08:42 )  x     / 0.54 ng/mL / 126 U/L / x     / 8.3 ng/mL  CARDIAC MARKERS ( 20 Sep 2017 00:07 )  x     / 0.50 ng/mL / 133 U/L / x     / 8.0 ng/mL  CARDIAC MARKERS ( 19 Sep 2017 17:37 )  x     / 0.54 ng/mL / 105 U/L / x     / 7.3 ng/mL      CAPILLARY BLOOD GLUCOSE        PT/INR - ( 21 Sep 2017 00:15 )   PT: 15.1 SEC;   INR: 1.34          PTT - ( 21 Sep 2017 00:15 )  PTT:28.2 SEC      Serum Pro-Brain Natriuretic Peptide: 5356 pg/mL (20 Sep 2017 03:18)          Fluid characteristics  -- 09-17 @ 08:09  pH >8    tprot 1.9    Cell count  Appearance Cloudy  Fluid type --  BF lymph 9  color Yellow  eosinophil 2  PMN 55  Mesothelial --  Monocyte 34  Other body cells --  Fluid characteristics        CULTURES: Culture - Blood (09.18.17 @ 08:34)    Specimen Source: .Blood Blood    Culture Results:   No growth to date.    Culture - Body Fluid with Gram Stain (09.17.17 @ 12:42)    Gram Stain:   No organisms seen per oil power field  polymorphonuclear leukocytes seen     per low power field  by cytocentrifuge    Specimen Source: Pleural Fl Other, Pleural Fluid    Culture Results:   No growth to date.    Culture - Blood (09.16.17 @ 15:30)    Specimen Source: .Blood Blood-Venous    Culture Results:   No growth to date.    Culture - Blood (09.16.17 @ 15:30)    Specimen Source: .Blood Blood-Venous    Culture Results:   No growth to date.    Culture - Body Fluid with Gram Stain (09.16.17 @ 00:58)    Gram Stain:   No polymorphonuclear cells seen  No organisms seen  by cytocentrifuge    Specimen Source: .Body Fluid Pleural Fluid    Culture Results:   No growth at 5 days      C Diff by PCR Result: NotDetec (09-20 @ 07:34)        Physical Examination:  intubated sedated on pressors, comfortable on prvc  General: No acute distress.      HEENT: Pupils equal, reactive to light.  Symmetric. ETT    PULM: decreased bs bilaterally, no significant sputum production, R pleural drain, L ct    CVS: S1, S2    ABD: Soft, nondistended, nontender, normoactive bowel sounds, no masses    EXT: No edema, nontender    SKIN: Warm and well perfused, no rashes noted.    NEURO: intubated sedated    RADIOLOGY REVIEWED  CXR:< from: Xray Chest 1 View AP- PORTABLE-Urgent (09.21.17 @ 01:11) >  IMPRESSION:  Follow-up study showing no interval change with tubes and   lines in place and small right effusion.    < end of copied text > PULMONARY CONSULT    Initial HPI on admission:  HPI:  82 M presents with non-functioning J tube and SOB. 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 Dyer and Dr. Christian Weinstein on 8/14/17. After surgery, patient was found to have an anastomotic leak and was treated conservatively with NPO, antibiotics, chest tube drainage, and J tube feeding. Patient was eventually discharged to rehab. On day of evaluation, patient's J tube was unable to be used, and was sent to the ED for evaluation. Patient also complains of SOB, and reported that R pleural drainage tube was malfunctioning and draining dark murky fluid. CT chest was done = large left effusion increased and stable Right effsuion with drain-unchanged. S/p Ir pigtail cath place to Left pleural effusion= exudate effusion-cx negative, amylase 32. Pt with leukocytosis tx empericly with flagyl, vanco and cefepime. Found to have indeterminante c.diff-tx with exteric vanco. Pt had been c/o coung, yesterday at its worst point, with acute bronchospasm on exam-intubated for hypercarbic resp failure and increased work of breathing. Initially tx with steroids for bronchospams but dc after pt was intubated when lung sounds were normal ? secretions. Pt had esophagram 9/20 which confirmed leak on R into pleural space. Pt with increasing Right side effusion with surrounding opacity-?worsening pna, ?pna with parapneumonic effusion or empyema-doubt. Concern was for new developing infection in Right lung with Right pleural drain being nidis of infection. Also h/o cad recent stents + NSTEMI- on heparin. Pt seen in cticu, intubated, on pressors, sedation..in process of going for ct c/a/p -then eventual eval of esophageal leak. Difficult to obtain IJO-[id number]-[id number], but comfortable on prvc, no secreations. O/W ros neg          PAST MEDICAL & SURGICAL HISTORY:  Coronary artery disease: s/p 3 stents on June 30, 2017 at [place] Dr. Lavelle Reid  OA (osteoarthritis) of knee: right  Former smoker, stopped smoking in distant past  Rheumatoid arthritis  Seizure disorder: 1 episode approximately 15 yrs ago  Asthma  Hyperlipidemia  BPH (benign prostatic hyperplasia)  HTN (hypertension)  Esophagus cancer  Chronic allergic rhinitis  BCC (basal cell carcinoma): skin  Cerebrovascular accident (CVA)  Anxiety  Anastomotic leak following esophagectomy: Vance trent esophagectomy  History of tonsillectomy  H/O heart artery stent: June 30, 2017  Knee arthropathy: left  History of total hip replacement: left  History of hernia repair    Allergies    penicillin (Rash)    Intolerances      FAMILY HISTORY:  Family history of heart attack (Sibling)  Family history of pulmonary embolism: father @ 49 yr old  FH: CAD (coronary artery disease)    Social history: former smoker    Review of Systems: unable intubated/sedated  CONSTITUTIONAL:   EYES:   ENMT:    NECK:   RESPIRATORY: Per above  CARDIOVASCULAR:   GASTROINTESTINAL:    GENITOURINARY:   NEUROLOGICAL:   SKIN:   MUSCULOSKELETAL:   PSYCHIATRIC:       Medications:  MEDICATIONS  (STANDING):  cefepime  IVPB 1000 milliGRAM(s) IV Intermittent every 12 hours  vancomycin  IVPB 750 milliGRAM(s) IV Intermittent every 12 hours  metroNIDAZOLE  IVPB      vancomycin    Solution 125 milliGRAM(s) Enteral Tube every 6 hours  ALBUTerol/ipratropium for Nebulization 3 milliLiter(s) Nebulizer every 6 hours  buDESOnide   90 MICROgram(s) Inhaler 1 Puff(s) Inhalation every 12 hours  aspirin  chewable 81 milliGRAM(s) Enteral Tube daily  clopidogrel Tablet 75 milliGRAM(s) Oral daily  phenylephrine    Infusion 0.3 MICROgram(s)/kG/Min (8.122 mL/Hr) IV Continuous <Continuous>  metroNIDAZOLE  IVPB 500 milliGRAM(s) IV Intermittent every 8 hours  chlorhexidine 0.12% Liquid 15 milliLiter(s) Swish and Spit five times a day  chlorhexidine 4% Liquid 1 Application(s) Topical daily  fosphenytoin IVPB 200 milliGRAM(s) PE IV Intermittent every 12 hours  folic acid Injectable 1 milliGRAM(s) IV Push daily  pyridoxine Injectable 100 milliGRAM(s) IV Push daily  cyanocobalamin Injectable 500 MICROGram(s) SubCutaneous daily  propofol Infusion 5 MICROgram(s)/kG/Min (2.166 mL/Hr) IV Continuous <Continuous>  fentaNYL   Infusion 1 MICROgram(s)/kG/Hr (7.22 mL/Hr) IV Continuous <Continuous>  dextrose 5% + sodium chloride 0.9%. 1000 milliLiter(s) (100 mL/Hr) IV Continuous <Continuous>  pantoprazole  Injectable 40 milliGRAM(s) IV Push two times a day  methylPREDNISolone sodium succinate Injectable 20 milliGRAM(s) IV Push two times a day    MEDICATIONS  (PRN):        Mode: AC/ CMV (Assist Control/ Continuous Mandatory Ventilation)  RR (machine): 16  TV (machine): 450  FiO2: 30  PEEP: 5  MAP: 9  PIP: 24      Vital Signs Last 24 Hrs  T(C): 36 (21 Sep 2017 10:00), Max: 36.7 (20 Sep 2017 19:00)  T(F): 96.8 (21 Sep 2017 10:00), Max: 98 (20 Sep 2017 19:00)  HR: 68 (21 Sep 2017 11:00) (64 - 100)  BP: 91/52 (21 Sep 2017 11:00) (79/46 - 126/54)  BP(mean): 61 (21 Sep 2017 11:00) (53 - 89)  RR: 16 (21 Sep 2017 11:00) (13 - 34)  SpO2: 97% (21 Sep 2017 11:00) (96% - 100%)    ABG - ( 21 Sep 2017 06:55 )  pH: 7.42  /  pCO2: 36    /  pO2: 113   / HCO3: 24    / Base Excess: -1.0  /  SaO2: 98.8        VBG pH 7.37 09-21 @ 00:15  VBG pCO2 45 09-21 @ 00:15  VBG O2 sat 80.0 09-21 @ 00:15  VBG lactate 1.4 09-21 @ 00:15        09-20 @ 07:01  -  09-21 @ 07:00  --------------------------------------------------------  IN: 897.6 mL / OUT: 360 mL / NET: 537.6 mL          LABS:                        10.0   9.41  )-----------( 224      ( 21 Sep 2017 06:55 )             29.4     09-21    143  |  111<H>  |  23  ----------------------------<  118<H>  3.8   |  21<L>  |  0.51    Ca    8.1<L>      21 Sep 2017 06:55  Phos  2.8     09-21  Mg     2.1     09-21    TPro  5.7<L>  /  Alb  1.9<L>  /  TBili  0.5  /  DBili  x   /  AST  153<H>  /  ALT  134<H>  /  AlkPhos  177<H>  09-21      CARDIAC MARKERS ( 20 Sep 2017 20:43 )  x     / 0.34 ng/mL / 57 U/L / x     / 5.7 ng/mL  CARDIAC MARKERS ( 20 Sep 2017 08:42 )  x     / 0.54 ng/mL / 126 U/L / x     / 8.3 ng/mL  CARDIAC MARKERS ( 20 Sep 2017 00:07 )  x     / 0.50 ng/mL / 133 U/L / x     / 8.0 ng/mL  CARDIAC MARKERS ( 19 Sep 2017 17:37 )  x     / 0.54 ng/mL / 105 U/L / x     / 7.3 ng/mL      CAPILLARY BLOOD GLUCOSE        PT/INR - ( 21 Sep 2017 00:15 )   PT: 15.1 SEC;   INR: 1.34          PTT - ( 21 Sep 2017 00:15 )  PTT:28.2 SEC      Serum Pro-Brain Natriuretic Peptide: 5356 pg/mL (20 Sep 2017 03:18)          Fluid characteristics  -- 09-17 @ 08:09  pH >8    tprot 1.9    Cell count  Appearance Cloudy  Fluid type --  BF lymph 9  color Yellow  eosinophil 2  PMN 55  Mesothelial --  Monocyte 34  Other body cells --  Fluid characteristics        CULTURES: Culture - Blood (09.18.17 @ 08:34)    Specimen Source: .Blood Blood    Culture Results:   No growth to date.    Culture - Body Fluid with Gram Stain (09.17.17 @ 12:42)    Gram Stain:   No organisms seen per oil power field  polymorphonuclear leukocytes seen     per low power field  by cytocentrifuge    Specimen Source: Pleural Fl Other, Pleural Fluid    Culture Results:   No growth to date.    Culture - Blood (09.16.17 @ 15:30)    Specimen Source: .Blood Blood-Venous    Culture Results:   No growth to date.    Culture - Blood (09.16.17 @ 15:30)    Specimen Source: .Blood Blood-Venous    Culture Results:   No growth to date.    Culture - Body Fluid with Gram Stain (09.16.17 @ 00:58)    Gram Stain:   No polymorphonuclear cells seen  No organisms seen  by cytocentrifuge    Specimen Source: .Body Fluid Pleural Fluid    Culture Results:   No growth at 5 days      C Diff by PCR Result: NotDetec (09-20 @ 07:34)        Physical Examination:  intubated sedated on pressors, comfortable on prvc  General: No acute distress.      HEENT: Pupils equal, reactive to light.  Symmetric. ETT    PULM: decreased bs bilaterally, no significant sputum production, R pleural drain, L ct    CVS: S1, S2    ABD: Soft, nondistended, nontender, normoactive bowel sounds, no masses    EXT: No edema, nontender    SKIN: Warm and well perfused, no rashes noted.    NEURO: intubated sedated    RADIOLOGY REVIEWED  CXR:< from: Xray Chest 1 View AP- PORTABLE-Urgent (09.21.17 @ 01:11) >  IMPRESSION:  Follow-up study showing no interval change with tubes and   lines in place and small right effusion.    < end of copied text >

## 2017-09-21 NOTE — PROCEDURE NOTE - NSPOSTPRCRAD_GEN_A_CORE
central line located in the superior vena cava/post-procedure radiography performed
chest tube in correct position

## 2017-09-21 NOTE — PROCEDURE NOTE - PROCEDURE
<<-----Click on this checkbox to enter Procedure Thoracostomy, tube  09/21/2017    Active  [facility]

## 2017-09-21 NOTE — CONSULT NOTE ADULT - ASSESSMENT
82M esophageal ca s/p chemo/RT followed by [place] esophagogastrectomy with feeding J tube. Complicated by leak s/p IV antibiotics, NPO - J-tube feeds.  Here with SOB likely due to persistent leak and b/l effusions.  On empiric antimicrobials including antifungal therapy. 82M esophageal ca s/p chemo/RT followed by [place] esophagogastrectomy with feeding J tube. Complicated by leak s/p IV antibiotics, NPO - J-tube feeds.  Here with SOB likely due to persistent leak and b/l effusions.  On empiric antimicrobials including antifungal therapy.  Would continue for now D#7.  F/u all cultures and hope for further drainage of pleural space.  No change for now.

## 2017-09-21 NOTE — CONSULT NOTE ADULT - ASSESSMENT
82 M  RA-chronic steroids, copd/asthma, cad-recent stents, MI, SHANA esophagectomy with persistant leak.presents. Now with respiratory failure, L pleural effusion=exudate, R chest drain -not functioning, and esophageal leak-R 82 M  RA-chronic steroids, copd/asthma, cad-recent stents, MI, SHANA esophagectomy. Now with respiratory failure, L pleural effusion=exudate, R chest drain -not functioning, and esophageal leak-R

## 2017-09-21 NOTE — PROGRESS NOTE ADULT - SUBJECTIVE AND OBJECTIVE BOX
MEJIA LATIF            MRN-[id number]         penicillin (Rash)               HPI:  82 M presents with non-functioning J tube and SOB. Patient recently underwent a robotic-assisted [place] esophagogastrectomy with feeding J tube placement for esophageal adenocarcinoma (T1aN0) with Dr. Dyer and Dr. Christian Weinstein on 8/14/17. After surgery, patient was found to have an anastomotic leak and was treated conservatively with NPO, antibiotics, chest tube drainage, and J tube feeding. Patient was eventually discharged to rehab. On day of evaluation, patient's J tube was unable to be used, and was sent to the ED for evaluation. Patient also complains of SOB, but no chest pain or abdominal pain. Denies fever or chills. Patient was taking medications by mouth, but was only getting nutrition via his J tube. Esophagram done on 9/19/17 showing  a leak at the distal site just above the hemidiaphragm into the pleural space.   Patient was found to have a Left pleural effusion. Patent received pigtail and developed respiratory distress. Patient was transferred to the SICU where he was intubated. Patient also with right GINA drain.  Patient is now being transferred to [facility] CTICU for optimal management. (20 Sep 2017 23:45)      Procedure: [place] esophagectomy  POD# 38    Issues:  Respiratory failure  Hypotension  Pleural effusion  Esophageal leak  Seizure disorder    Home Medications:   * No Current Medications as of 20-Sep-2017 21:03 documented in Structured Notes  · 	aspirin-dipyridamole 25 mg-200 mg oral capsule, extended release: 1 cap(s) orally 2 times a day  · 	docusate sodium 100 mg oral capsule: 1 cap(s) orally 2 times a day  · 	ProAir HFA 90 mcg/inh inhalation aerosol: 2 puff(s) inhaled 4 times a day, As Needed   · 	Vitamin B-12 500 mcg oral tablet: 1 tab(s) orally once a day  · 	amLODIPine 5 mg oral tablet: 1 tab(s) orally once a day in pm  · 	simvastatin 40 mg oral tablet: 1 tab(s) orally once a day in pm  · 	tamsulosin 0.4 mg oral capsule: 1 cap(s) orally once a day in pm  · 	Zetia 10 mg oral tablet: 1 tab(s) orally once a day in pm  · 	ALPRAZolam 0.25 mg oral tablet: 2 tab(s) orally 2 times a day  · 	olmesartan 20 mg oral tablet: 1 tab(s) orally once a day in pm  · 	montelukast 10 mg oral tablet: 1 tab(s) orally once a day in pm  · 	Dilantin 100 mg oral capsule: 2 cap(s) orally 2 times a day  · 	predniSONE 5 mg oral tablet: 1 tab(s) orally once a day in am  · 	finasteride 5 mg oral tablet: 1 tab(s) orally once a day in pm  · 	folic acid 0.4 mg oral tablet: 1 tab(s) orally once a day  · 	Vitamin B6 100 mg oral tablet: 1 tab(s) orally once a day    PAST MEDICAL & SURGICAL HISTORY:  Coronary artery disease: s/p 3 stents on June 30, 2017 at [place] Dr. Lavelle Reid  OA (osteoarthritis) of knee: right  Former smoker, stopped smoking in distant past  Rheumatoid arthritis  Seizure disorder: 1 episode approximately 15 yrs ago  Asthma  Hyperlipidemia  BPH (benign prostatic hyperplasia)  HTN (hypertension)  Esophagus cancer  Chronic allergic rhinitis  BCC (basal cell carcinoma): skin  Cerebrovascular accident (CVA)  Anxiety  Anastomotic leak following esophagectomy: Vance trent esophagectomy  History of tonsillectomy  H/O heart artery stent: June 30, 2017  Knee arthropathy: left  History of total hip replacement: left  History of hernia repair        ICU Vital Signs Last 24 Hrs  T(C): 35.6 (21 Sep 2017 08:00), Max: 36.7 (20 Sep 2017 19:00)  T(F): 96 (21 Sep 2017 08:00), Max: 98 (20 Sep 2017 19:00)  HR: 65 (21 Sep 2017 10:00) (64 - 103)  BP: 92/52 (21 Sep 2017 10:00) (79/46 - 126/54)  BP(mean): 61 (21 Sep 2017 10:00) (53 - 89)  ABP: 97/48 (21 Sep 2017 10:00) (78/62 - 147/109)  ABP(mean): 66 (21 Sep 2017 10:00) (61 - 124)  RR: 16 (21 Sep 2017 10:00) (13 - 38)  SpO2: 98% (21 Sep 2017 10:00) (96% - 100%)    I&O's Summary    20 Sep 2017 07:01  -  21 Sep 2017 07:00  --------------------------------------------------------  IN: 897.6 mL / OUT: 360 mL / NET: 537.6 mL    21 Sep 2017 07:01  -  21 Sep 2017 11:14  --------------------------------------------------------  IN: 432.7 mL / OUT: 195 mL / NET: 237.7 mL      CAPILLARY BLOOD GLUCOSE      MEDICATIONS  (STANDING):  cefepime  IVPB 1000 milliGRAM(s) IV Intermittent every 12 hours  vancomycin  IVPB 750 milliGRAM(s) IV Intermittent every 12 hours  metroNIDAZOLE  IVPB      vancomycin    Solution 125 milliGRAM(s) Enteral Tube every 6 hours  ALBUTerol/ipratropium for Nebulization 3 milliLiter(s) Nebulizer every 6 hours  buDESOnide   90 MICROgram(s) Inhaler 1 Puff(s) Inhalation every 12 hours  aspirin  chewable 81 milliGRAM(s) Enteral Tube daily  clopidogrel Tablet 75 milliGRAM(s) Oral daily  phenylephrine    Infusion 0.3 MICROgram(s)/kG/Min (8.122 mL/Hr) IV Continuous <Continuous>  metroNIDAZOLE  IVPB 500 milliGRAM(s) IV Intermittent every 8 hours  chlorhexidine 0.12% Liquid 15 milliLiter(s) Swish and Spit five times a day  chlorhexidine 4% Liquid 1 Application(s) Topical daily  fosphenytoin IVPB 200 milliGRAM(s) PE IV Intermittent every 12 hours  folic acid Injectable 1 milliGRAM(s) IV Push daily  pyridoxine Injectable 100 milliGRAM(s) IV Push daily  cyanocobalamin Injectable 500 MICROGram(s) SubCutaneous daily  propofol Infusion 5 MICROgram(s)/kG/Min (2.166 mL/Hr) IV Continuous <Continuous>  fentaNYL   Infusion 1 MICROgram(s)/kG/Hr (7.22 mL/Hr) IV Continuous <Continuous>  dextrose 5% + sodium chloride 0.9%. 1000 milliLiter(s) (100 mL/Hr) IV Continuous <Continuous>  pantoprazole  Injectable 40 milliGRAM(s) IV Push two times a day  methylPREDNISolone sodium succinate Injectable 20 milliGRAM(s) IV Push two times a day    MEDICATIONS  (PRN):      Physical exam:                             General:               Pt is sedated on vent support.                                               Neuro:                  Sedated                           Cardiovascular:      S1 & S2, regular                           Respiratory:         Air entry is fair and equal on both sides, has bilateral conducted sounds                           GI:                       Soft, nondistended and nontender                            Ext:                      No cyanosis or edema     Labs:                                                                           10.0   9.41  )-----------( 224      ( 21 Sep 2017 06:55 )             29.4             09-21    143  |  111<H>  |  23  ----------------------------<  118<H>  3.8   |  21<L>  |  0.51    Ca    8.1<L>      21 Sep 2017 06:55  Phos  2.8     09-21  Mg     2.1     09-21    TPro  5.7<L>  /  Alb  1.9<L>  /  TBili  0.5  /  DBili  x   /  AST  153<H>  /  ALT  134<H>  /  AlkPhos  177<H>  09-21                  PT/INR - ( 21 Sep 2017 00:15 )   PT: 15.1 SEC;   INR: 1.34          PTT - ( 21 Sep 2017 00:15 )  PTT:28.2 SEC    CXR: 9/21/17    The endotracheal,left IJ line and right-sided chest tubes are unchanged.   Left lung is clear. Small layering right effusion unchanged and   loculated. No pneumothorax. No focal consolidations.    Plan:    General: 82yMale s/p  [place] esophagectomy  POD#38, admitted to [facility] for non-functioning J-tube, pleural effusion, got intubated while in SICU, transferred to [facility] for further treatment.                          Neuro:                                          Pain control with Fentanyl PRN                            Cardiovascular:                                          Continue hemodynamic monitoring.    Hypotension: Titrate Ryan to MAP>65. Continue IVF                            Respiratory:                                         Full mechanical ventilator support, FU--40-5                                           Monitor chest tube output                                         GINA to low continuos suction, L-Pigtail to pleurovac                                                                Continue bronchodilators, pulmonary toilet                            GI                                         NPO     EGD today                                         Continue GI prophylaxis with Protonix                                         Restart J-tube feeds later	                                                                 Renal:                                         Continue D5LR 100cc/hr                                         Monitor I/Os and electrolytes                                         López                                                  Hem/ Onc:                                                                                  Monitor chest tube output                                          Received 2 PRBCs                           Infectious disease:                                            Continue Vanco / Cefipime / Flagyl      PCR for C-dif: Non-reactive       Follow culture                            Endocrine                                             Continue Accu-Checks with coverage        Pertinent clinical, laboratory, radiographic, hemodynamic, echocardiographic, respiratory data, microbiologic data and chart were reviewed and analyzed frequently throughout the course of the day and night  Patient seen, examined and plan discussed with CT Surgeon Dr. Weinstein / CTICU team during rounds.    Pt's status discussed with family at bedside, updated status    I have spent 90 minutes of critical care time with this pt between  7 am and 12  am        Jose Sharif MD MEJIA LATIF            MRN-[id number]         penicillin (Rash)               HPI:  82 M presents with non-functioning J tube and SOB. Patient recently underwent a robotic-assisted [place] esophagogastrectomy with feeding J tube placement for esophageal adenocarcinoma (T1aN0) with Dr. Dyer and Dr. Christian Weinstein on 8/14/17. After surgery, patient was found to have an anastomotic leak and was treated conservatively with NPO, antibiotics, chest tube drainage, and J tube feeding. Patient was eventually discharged to rehab. On day of evaluation, patient's J tube was unable to be used, and was sent to the ED for evaluation. Patient also complains of SOB, but no chest pain or abdominal pain. Denies fever or chills. Patient was taking medications by mouth, but was only getting nutrition via his J tube. Esophagram done on 9/19/17 showing  a leak at the distal site just above the hemidiaphragm into the pleural space.   Patient was found to have a Left pleural effusion. Patent received pigtail and developed respiratory distress. Patient was transferred to the SICU where he was intubated. Patient also with right GINA drain.  Patient is now being transferred to [facility] CTICU for optimal management. (20 Sep 2017 23:45)      Procedure: [place] esophagectomy  POD# 38    Issues:  Respiratory failure  Hypotension  Pleural effusion  Esophageal leak  Seizure disorder    Home Medications:   * No Current Medications as of 20-Sep-2017 21:03 documented in Structured Notes  · 	aspirin-dipyridamole 25 mg-200 mg oral capsule, extended release: 1 cap(s) orally 2 times a day  · 	docusate sodium 100 mg oral capsule: 1 cap(s) orally 2 times a day  · 	ProAir HFA 90 mcg/inh inhalation aerosol: 2 puff(s) inhaled 4 times a day, As Needed   · 	Vitamin B-12 500 mcg oral tablet: 1 tab(s) orally once a day  · 	amLODIPine 5 mg oral tablet: 1 tab(s) orally once a day in pm  · 	simvastatin 40 mg oral tablet: 1 tab(s) orally once a day in pm  · 	tamsulosin 0.4 mg oral capsule: 1 cap(s) orally once a day in pm  · 	Zetia 10 mg oral tablet: 1 tab(s) orally once a day in pm  · 	ALPRAZolam 0.25 mg oral tablet: 2 tab(s) orally 2 times a day  · 	olmesartan 20 mg oral tablet: 1 tab(s) orally once a day in pm  · 	montelukast 10 mg oral tablet: 1 tab(s) orally once a day in pm  · 	Dilantin 100 mg oral capsule: 2 cap(s) orally 2 times a day  · 	predniSONE 5 mg oral tablet: 1 tab(s) orally once a day in am  · 	finasteride 5 mg oral tablet: 1 tab(s) orally once a day in pm  · 	folic acid 0.4 mg oral tablet: 1 tab(s) orally once a day  · 	Vitamin B6 100 mg oral tablet: 1 tab(s) orally once a day    PAST MEDICAL & SURGICAL HISTORY:  Coronary artery disease: s/p 3 stents on June 30, 2017 at [place] Dr. Lavelle Reid  OA (osteoarthritis) of knee: right  Former smoker, stopped smoking in distant past  Rheumatoid arthritis  Seizure disorder: 1 episode approximately 15 yrs ago  Asthma  Hyperlipidemia  BPH (benign prostatic hyperplasia)  HTN (hypertension)  Esophagus cancer  Chronic allergic rhinitis  BCC (basal cell carcinoma): skin  Cerebrovascular accident (CVA)  Anxiety  Anastomotic leak following esophagectomy: Vance trent esophagectomy  History of tonsillectomy  H/O heart artery stent: June 30, 2017  Knee arthropathy: left  History of total hip replacement: left  History of hernia repair        ICU Vital Signs Last 24 Hrs  T(C): 35.6 (21 Sep 2017 08:00), Max: 36.7 (20 Sep 2017 19:00)  T(F): 96 (21 Sep 2017 08:00), Max: 98 (20 Sep 2017 19:00)  HR: 65 (21 Sep 2017 10:00) (64 - 103)  BP: 92/52 (21 Sep 2017 10:00) (79/46 - 126/54)  BP(mean): 61 (21 Sep 2017 10:00) (53 - 89)  ABP: 97/48 (21 Sep 2017 10:00) (78/62 - 147/109)  ABP(mean): 66 (21 Sep 2017 10:00) (61 - 124)  RR: 16 (21 Sep 2017 10:00) (13 - 38)  SpO2: 98% (21 Sep 2017 10:00) (96% - 100%)    I&O's Summary    20 Sep 2017 07:01  -  21 Sep 2017 07:00  --------------------------------------------------------  IN: 897.6 mL / OUT: 360 mL / NET: 537.6 mL    21 Sep 2017 07:01  -  21 Sep 2017 11:14  --------------------------------------------------------  IN: 432.7 mL / OUT: 195 mL / NET: 237.7 mL      CAPILLARY BLOOD GLUCOSE      MEDICATIONS  (STANDING):  cefepime  IVPB 1000 milliGRAM(s) IV Intermittent every 12 hours  vancomycin  IVPB 750 milliGRAM(s) IV Intermittent every 12 hours  metroNIDAZOLE  IVPB      vancomycin    Solution 125 milliGRAM(s) Enteral Tube every 6 hours  ALBUTerol/ipratropium for Nebulization 3 milliLiter(s) Nebulizer every 6 hours  buDESOnide   90 MICROgram(s) Inhaler 1 Puff(s) Inhalation every 12 hours  aspirin  chewable 81 milliGRAM(s) Enteral Tube daily  clopidogrel Tablet 75 milliGRAM(s) Oral daily  phenylephrine    Infusion 0.3 MICROgram(s)/kG/Min (8.122 mL/Hr) IV Continuous <Continuous>  metroNIDAZOLE  IVPB 500 milliGRAM(s) IV Intermittent every 8 hours  chlorhexidine 0.12% Liquid 15 milliLiter(s) Swish and Spit five times a day  chlorhexidine 4% Liquid 1 Application(s) Topical daily  fosphenytoin IVPB 200 milliGRAM(s) PE IV Intermittent every 12 hours  folic acid Injectable 1 milliGRAM(s) IV Push daily  pyridoxine Injectable 100 milliGRAM(s) IV Push daily  cyanocobalamin Injectable 500 MICROGram(s) SubCutaneous daily  propofol Infusion 5 MICROgram(s)/kG/Min (2.166 mL/Hr) IV Continuous <Continuous>  fentaNYL   Infusion 1 MICROgram(s)/kG/Hr (7.22 mL/Hr) IV Continuous <Continuous>  dextrose 5% + sodium chloride 0.9%. 1000 milliLiter(s) (100 mL/Hr) IV Continuous <Continuous>  pantoprazole  Injectable 40 milliGRAM(s) IV Push two times a day  methylPREDNISolone sodium succinate Injectable 20 milliGRAM(s) IV Push two times a day    MEDICATIONS  (PRN):      Physical exam:                             General:               Pt is sedated on vent support.                                               Neuro:                  Sedated                           Cardiovascular:      S1 & S2, regular                           Respiratory:         Air entry is fair and equal on both sides, has bilateral conducted sounds                           GI:                       Soft, nondistended and nontender                            Ext:                      No cyanosis or edema     Labs:                                                                           10.0   9.41  )-----------( 224      ( 21 Sep 2017 06:55 )             29.4             09-21    143  |  111<H>  |  23  ----------------------------<  118<H>  3.8   |  21<L>  |  0.51    Ca    8.1<L>      21 Sep 2017 06:55  Phos  2.8     09-21  Mg     2.1     09-21    TPro  5.7<L>  /  Alb  1.9<L>  /  TBili  0.5  /  DBili  x   /  AST  153<H>  /  ALT  134<H>  /  AlkPhos  177<H>  09-21                  PT/INR - ( 21 Sep 2017 00:15 )   PT: 15.1 SEC;   INR: 1.34          PTT - ( 21 Sep 2017 00:15 )  PTT:28.2 SEC    CXR: 9/21/17    The endotracheal,left IJ line and right-sided chest tubes are unchanged.   Left lung is clear. Small layering right effusion unchanged and   loculated. No pneumothorax. No focal consolidations.    Plan:    General: 82yMale s/p  [place] esophagectomy  POD#38, admitted to [facility] for non-functioning J-tube, pleural effusion, got intubated while in SICU, transferred to [facility] for further treatment.                          Neuro:                                          Pain control with Fentanyl PRN                            Cardiovascular:                                          Continue hemodynamic monitoring.    Hypotension: Titrate Ryan to MAP>65. Continue IVF                            Respiratory:                                         Full mechanical ventilator support, PV--40-5                                           Monitor chest tube output                                         GINA to low continuos suction, L-Pigtail to pleurovac                                                                Continue bronchodilators, pulmonary toilet     CT -Chest/Abd showed loculated pleural effusion - pigtail catheter was inserted and drained 350 cc of serous fluid                            GI                                         NPO     EGD - contained perforation of stomach & leak near anastomosis site                                          Continue GI prophylaxis with Protonix                                         Restart J-tube feeds later	                                                                 Renal:                                         Continue D5LR 100cc/hr                                         Monitor I/Os and electrolytes                                         López                                                  Hem/ Onc:                                         Monitor chest tube output                                          Received 2 PRBCs                           Infectious disease:                                            Continue Vanco / Cefipime / Flagyl      PCR for C-dif: Non-reactive       Follow culture- of pleural effusion                            Endocrine                                             Continue Accu-Checks with coverage        Pertinent clinical, laboratory, radiographic, hemodynamic, echocardiographic, respiratory data, microbiologic data and chart were reviewed and analyzed frequently throughout the course of the day and night  Patient seen, examined and plan discussed with CT Surgeon Dr. Weinstein / CTICU team during rounds.    Pt's status discussed with family at bedside, updated status    I have spent 90 minutes of critical care time with this pt between  7 am and 12  am        Jose Sharif MD

## 2017-09-21 NOTE — CONSULT NOTE ADULT - PROBLEM SELECTOR RECOMMENDATION 5
pt with cad and recent stents  + NSTEMI follow by cards , was on heparin gtt  cards f/u pt with cad and recent stents  now off heparin d/t hgb drop  medically managed  cards f/u

## 2017-09-21 NOTE — PROCEDURE NOTE - NSPROCDETAILS_GEN_ALL_CORE
sterile dressing applied/sterile technique, catheter placed/guidewire recovered/lumen(s) aspirated and flushed/ultrasound guidance
Seldinger technique/ultrasound assessment of fluid (size)/ultrasound assessment of fluid (location)

## 2017-09-21 NOTE — CONSULT NOTE ADULT - ATTENDING COMMENTS
CT chest with worsening rt loculations  new pigtail placed  hope to extubate tomorrow  leak fixed apparently by GI  d/w CTICU doc

## 2017-09-22 LAB
BASE EXCESS BLDA CALC-SCNC: -1.7 MMOL/L — SIGNIFICANT CHANGE UP
BASE EXCESS BLDA CALC-SCNC: -2.2 MMOL/L — SIGNIFICANT CHANGE UP
BUN SERPL-MCNC: 16 MG/DL — SIGNIFICANT CHANGE UP (ref 7–23)
CALCIUM SERPL-MCNC: 8 MG/DL — LOW (ref 8.4–10.5)
CHLORIDE SERPL-SCNC: 109 MMOL/L — HIGH (ref 98–107)
CO2 SERPL-SCNC: 19 MMOL/L — LOW (ref 22–31)
CREAT SERPL-MCNC: 0.53 MG/DL — SIGNIFICANT CHANGE UP (ref 0.5–1.3)
GLUCOSE SERPL-MCNC: 195 MG/DL — HIGH (ref 70–99)
HCO3 BLDA-SCNC: 23 MMOL/L — SIGNIFICANT CHANGE UP (ref 22–26)
HCO3 BLDA-SCNC: 23 MMOL/L — SIGNIFICANT CHANGE UP (ref 22–26)
HCT VFR BLD CALC: 32.3 % — LOW (ref 39–50)
HGB BLD-MCNC: 10.5 G/DL — LOW (ref 13–17)
LIDOCAIN SERPL-MCNC: 132 NMOL/L — HIGH
MAGNESIUM SERPL-MCNC: 2 MG/DL — SIGNIFICANT CHANGE UP (ref 1.6–2.6)
MCHC RBC-ENTMCNC: 29.5 PG — SIGNIFICANT CHANGE UP (ref 27–34)
MCHC RBC-ENTMCNC: 32.5 % — SIGNIFICANT CHANGE UP (ref 32–36)
MCV RBC AUTO: 90.7 FL — SIGNIFICANT CHANGE UP (ref 80–100)
METHOD TYPE: SIGNIFICANT CHANGE UP
NRBC # FLD: 0 — SIGNIFICANT CHANGE UP
ORGANISM # SPEC MICROSCOPIC CNT: SIGNIFICANT CHANGE UP
ORGANISM # SPEC MICROSCOPIC CNT: SIGNIFICANT CHANGE UP
PCO2 BLDA: 38 MMHG — SIGNIFICANT CHANGE UP (ref 35–48)
PCO2 BLDA: 41 MMHG — SIGNIFICANT CHANGE UP (ref 35–48)
PH BLDA: 7.37 PH — SIGNIFICANT CHANGE UP (ref 7.35–7.45)
PH BLDA: 7.38 PH — SIGNIFICANT CHANGE UP (ref 7.35–7.45)
PHOSPHATE SERPL-MCNC: 2.6 MG/DL — SIGNIFICANT CHANGE UP (ref 2.5–4.5)
PLATELET # BLD AUTO: 247 K/UL — SIGNIFICANT CHANGE UP (ref 150–400)
PMV BLD: 9.2 FL — SIGNIFICANT CHANGE UP (ref 7–13)
PO2 BLDA: 105 MMHG — SIGNIFICANT CHANGE UP (ref 83–108)
PO2 BLDA: 94 MMHG — SIGNIFICANT CHANGE UP (ref 83–108)
POTASSIUM SERPL-MCNC: 4 MMOL/L — SIGNIFICANT CHANGE UP (ref 3.5–5.3)
POTASSIUM SERPL-SCNC: 4 MMOL/L — SIGNIFICANT CHANGE UP (ref 3.5–5.3)
RBC # BLD: 3.56 M/UL — LOW (ref 4.2–5.8)
RBC # FLD: 16 % — HIGH (ref 10.3–14.5)
SAO2 % BLDA: 98 % — SIGNIFICANT CHANGE UP (ref 95–99)
SAO2 % BLDA: 98.3 % — SIGNIFICANT CHANGE UP (ref 95–99)
SODIUM SERPL-SCNC: 139 MMOL/L — SIGNIFICANT CHANGE UP (ref 135–145)
SPECIMEN SOURCE: SIGNIFICANT CHANGE UP
SPECIMEN SOURCE: SIGNIFICANT CHANGE UP
TROPONIN T SERPL-MCNC: 0.4 NG/ML — HIGH (ref 0–0.06)
VANCOMYCIN TROUGH SERPL-MCNC: 17.9 UG/ML — SIGNIFICANT CHANGE UP (ref 10–20)
WBC # BLD: 11.22 K/UL — HIGH (ref 3.8–10.5)
WBC # FLD AUTO: 11.22 K/UL — HIGH (ref 3.8–10.5)

## 2017-09-22 PROCEDURE — 93306 TTE W/DOPPLER COMPLETE: CPT | Mod: 26

## 2017-09-22 PROCEDURE — 71010: CPT | Mod: 26

## 2017-09-22 PROCEDURE — 99292 CRITICAL CARE ADDL 30 MIN: CPT

## 2017-09-22 PROCEDURE — 99291 CRITICAL CARE FIRST HOUR: CPT

## 2017-09-22 PROCEDURE — 99232 SBSQ HOSP IP/OBS MODERATE 35: CPT

## 2017-09-22 RX ORDER — PHENYLEPHRINE HYDROCHLORIDE 10 MG/ML
0.2 INJECTION INTRAVENOUS
Qty: 160 | Refills: 0 | Status: DISCONTINUED | OUTPATIENT
Start: 2017-09-22 | End: 2017-09-22

## 2017-09-22 RX ORDER — FUROSEMIDE 40 MG
10 TABLET ORAL ONCE
Qty: 0 | Refills: 0 | Status: COMPLETED | OUTPATIENT
Start: 2017-09-22 | End: 2017-09-22

## 2017-09-22 RX ORDER — DEXMEDETOMIDINE HYDROCHLORIDE IN 0.9% SODIUM CHLORIDE 4 UG/ML
0.2 INJECTION INTRAVENOUS
Qty: 200 | Refills: 0 | Status: DISCONTINUED | OUTPATIENT
Start: 2017-09-22 | End: 2017-09-23

## 2017-09-22 RX ORDER — PHENYLEPHRINE HYDROCHLORIDE 10 MG/ML
0.8 INJECTION INTRAVENOUS
Qty: 80 | Refills: 0 | Status: DISCONTINUED | OUTPATIENT
Start: 2017-09-22 | End: 2017-09-26

## 2017-09-22 RX ORDER — SODIUM CHLORIDE 9 MG/ML
1000 INJECTION INTRAMUSCULAR; INTRAVENOUS; SUBCUTANEOUS
Qty: 0 | Refills: 0 | Status: DISCONTINUED | OUTPATIENT
Start: 2017-09-22 | End: 2017-10-02

## 2017-09-22 RX ORDER — ASPIRIN/CALCIUM CARB/MAGNESIUM 324 MG
300 TABLET ORAL DAILY
Qty: 0 | Refills: 0 | Status: DISCONTINUED | OUTPATIENT
Start: 2017-09-22 | End: 2017-10-02

## 2017-09-22 RX ADMIN — Medication 150 MILLIGRAM(S): at 21:00

## 2017-09-22 RX ADMIN — Medication 10 MILLIGRAM(S): at 17:30

## 2017-09-22 RX ADMIN — CEFEPIME 100 MILLIGRAM(S): 1 INJECTION, POWDER, FOR SOLUTION INTRAMUSCULAR; INTRAVENOUS at 05:12

## 2017-09-22 RX ADMIN — Medication 20 MILLIGRAM(S): at 05:12

## 2017-09-22 RX ADMIN — Medication 150 MILLIGRAM(S): at 09:09

## 2017-09-22 RX ADMIN — CHLORHEXIDINE GLUCONATE 15 MILLILITER(S): 213 SOLUTION TOPICAL at 08:00

## 2017-09-22 RX ADMIN — Medication 1 PUFF(S): at 21:41

## 2017-09-22 RX ADMIN — CHLORHEXIDINE GLUCONATE 15 MILLILITER(S): 213 SOLUTION TOPICAL at 13:06

## 2017-09-22 RX ADMIN — Medication 300 MILLIGRAM(S): at 16:58

## 2017-09-22 RX ADMIN — Medication 100 MILLIGRAM(S): at 22:32

## 2017-09-22 RX ADMIN — FENTANYL CITRATE 7.22 MICROGRAM(S)/KG/HR: 50 INJECTION INTRAVENOUS at 19:30

## 2017-09-22 RX ADMIN — FOSPHENYTOIN 108 MILLIGRAM(S) PE: 50 INJECTION INTRAMUSCULAR; INTRAVENOUS at 05:32

## 2017-09-22 RX ADMIN — Medication 10 MILLIGRAM(S): at 05:33

## 2017-09-22 RX ADMIN — ALBUTEROL 2 PUFF(S): 90 AEROSOL, METERED ORAL at 16:01

## 2017-09-22 RX ADMIN — ALBUTEROL 2 PUFF(S): 90 AEROSOL, METERED ORAL at 03:43

## 2017-09-22 RX ADMIN — CEFEPIME 100 MILLIGRAM(S): 1 INJECTION, POWDER, FOR SOLUTION INTRAMUSCULAR; INTRAVENOUS at 17:14

## 2017-09-22 RX ADMIN — PANTOPRAZOLE SODIUM 40 MILLIGRAM(S): 20 TABLET, DELAYED RELEASE ORAL at 17:14

## 2017-09-22 RX ADMIN — CHLORHEXIDINE GLUCONATE 1 APPLICATION(S): 213 SOLUTION TOPICAL at 06:52

## 2017-09-22 RX ADMIN — FENTANYL CITRATE 1 MICROGRAM(S)/KG/HR: 50 INJECTION INTRAVENOUS at 19:45

## 2017-09-22 RX ADMIN — Medication 100 MILLIGRAM(S): at 14:02

## 2017-09-22 RX ADMIN — FENTANYL CITRATE 1 MICROGRAM(S)/KG/HR: 50 INJECTION INTRAVENOUS at 22:32

## 2017-09-22 RX ADMIN — FENTANYL CITRATE 7.22 MICROGRAM(S)/KG/HR: 50 INJECTION INTRAVENOUS at 13:23

## 2017-09-22 RX ADMIN — Medication 1 PUFF(S): at 09:53

## 2017-09-22 RX ADMIN — SODIUM CHLORIDE 10 MILLILITER(S): 9 INJECTION INTRAMUSCULAR; INTRAVENOUS; SUBCUTANEOUS at 13:23

## 2017-09-22 RX ADMIN — MICAFUNGIN SODIUM 105 MILLIGRAM(S): 100 INJECTION, POWDER, LYOPHILIZED, FOR SOLUTION INTRAVENOUS at 13:07

## 2017-09-22 RX ADMIN — CHLORHEXIDINE GLUCONATE 15 MILLILITER(S): 213 SOLUTION TOPICAL at 01:27

## 2017-09-22 RX ADMIN — Medication 1 PUFF(S): at 17:35

## 2017-09-22 RX ADMIN — PHENYLEPHRINE HYDROCHLORIDE 8.12 MICROGRAM(S)/KG/MIN: 10 INJECTION INTRAVENOUS at 13:21

## 2017-09-22 RX ADMIN — PREGABALIN 500 MICROGRAM(S): 225 CAPSULE ORAL at 13:06

## 2017-09-22 RX ADMIN — Medication 20 MILLIGRAM(S): at 17:14

## 2017-09-22 RX ADMIN — FOSPHENYTOIN 108 MILLIGRAM(S) PE: 50 INJECTION INTRAMUSCULAR; INTRAVENOUS at 18:10

## 2017-09-22 RX ADMIN — CHLORHEXIDINE GLUCONATE 15 MILLILITER(S): 213 SOLUTION TOPICAL at 16:00

## 2017-09-22 RX ADMIN — ALBUTEROL 2 PUFF(S): 90 AEROSOL, METERED ORAL at 21:41

## 2017-09-22 RX ADMIN — PROPOFOL 2.17 MICROGRAM(S)/KG/MIN: 10 INJECTION, EMULSION INTRAVENOUS at 19:30

## 2017-09-22 RX ADMIN — PHENYLEPHRINE HYDROCHLORIDE 21.66 MICROGRAM(S)/KG/MIN: 10 INJECTION INTRAVENOUS at 21:37

## 2017-09-22 RX ADMIN — Medication 1 MILLIGRAM(S): at 13:06

## 2017-09-22 RX ADMIN — SODIUM CHLORIDE 10 MILLILITER(S): 9 INJECTION INTRAMUSCULAR; INTRAVENOUS; SUBCUTANEOUS at 19:30

## 2017-09-22 RX ADMIN — PROPOFOL 2.17 MICROGRAM(S)/KG/MIN: 10 INJECTION, EMULSION INTRAVENOUS at 14:03

## 2017-09-22 RX ADMIN — PANTOPRAZOLE SODIUM 40 MILLIGRAM(S): 20 TABLET, DELAYED RELEASE ORAL at 05:33

## 2017-09-22 RX ADMIN — Medication 100 MILLIGRAM(S): at 05:12

## 2017-09-22 RX ADMIN — Medication 100 MILLIGRAM(S): at 13:06

## 2017-09-22 RX ADMIN — CHLORHEXIDINE GLUCONATE 15 MILLILITER(S): 213 SOLUTION TOPICAL at 21:37

## 2017-09-22 RX ADMIN — DEXMEDETOMIDINE HYDROCHLORIDE IN 0.9% SODIUM CHLORIDE 3.61 MICROGRAM(S)/KG/HR: 4 INJECTION INTRAVENOUS at 13:22

## 2017-09-22 RX ADMIN — ALBUTEROL 2 PUFF(S): 90 AEROSOL, METERED ORAL at 09:53

## 2017-09-22 RX ADMIN — Medication 1 PUFF(S): at 21:42

## 2017-09-22 RX ADMIN — Medication 1 PUFF(S): at 03:43

## 2017-09-22 NOTE — PROGRESS NOTE ADULT - ASSESSMENT
82M esophageal ca s/p chemo/RT followed by [place] esophagogastrectomy with feeding J tube. Complicated by leak s/p IV antibiotics, NPO - J-tube feeds.  Here with SOB likely due to persistent leak and b/l effusions.  On empiric antimicrobials including antifungal therapy. 82M CAD/stent, OA, RA on steroids, seizures, chol, asthma, htn, CVA, esophageal ca s/p chemo/RT followed by [place]-Trent esophagogastrectomy 8/14/17 with feeding J tube. 8/21/17, diagnosed with anastomotic leak s/p IV antibiotics (8/20/17 - 8/23/17), NPO - J-tube feeds.  Here with SOB likely due to persistent leak and b/l effusions.  On empiric antimicrobials (since 9/15-) including antifungal therapy since (9/20-). CT with loculated R pleural effusion, L pleural effusion and pericardial effusion/pericarditis.  All pleural fluid with negative cultures.  9/21 s/p EGD:   Perforation in esophagus at approximately 28cm from incisors; Perforation in stomach at approximately 40cm from incisors.  Also, several BC with CNS - the last 2 sets with CNS but were drawn from central line (after it was newly placed).  I think the CNS in blood are contaminants.  Can repeat peripheral sticks x2.    Empiric vanc/cefepime/micafungin  for repeat peripheral BC x2  Per Thoracic - conservative management for now - may or may not need intervention but he is also s/p NSTEMI  check vancomycin trough  guarded prognosis    I have discussed plan of care with consulting team (CTICU intensivist)    Please call the ID service [phone] with questions or concerns over the weekend

## 2017-09-22 NOTE — PROGRESS NOTE ADULT - SUBJECTIVE AND OBJECTIVE BOX
82 M PMH Asthma, Anxiety, BCC skin, BPH, CVA, Chronic allergic rhinitis, HTN, HLD, RA, Sz DO, w recent esophagectomt 2/2 CA w malfunctioning J-tube.  The patienet presented to [facility] from a rehab center with non-functioning J tube and SOB. He had undergone a robotic-assisted [place]-Trent esophagogastrectomy with feeding J tube placement for esophageal adenocarcinoma (T1aN0) with Dr. Dyer and Dr. Christian Weinstein on 8/14/17. After surgery, patient was found to have an anastomotic leak and was treated conservatively with NPO, antibiotics, chest tube drainage, and J tube feeding. Patient was eventually discharged to rehab. On DOA, patient's J tube was unable to be used, and he was sent to the ED for evaluation. Patient also complained of SOB, but no chest pain or abdominal pain. Denies fever or chills. Patient was taking medications by mouth, but was only getting nutrition via the J tube. Esophagram done on 9/19/17 showing  a leak at the distal site just above the hemidiaphragm into the pleural space.   Patient was found to have a Left pleural effusion, underwent a pigtail placement, developed respiratory distress, was transferred to the SICU where he was intubated. Patient also with right GINA drain.  He was then  transferred to [facility]ICU for further management.   POD # 39 ([id number]): Natalbany Trent esophagectomy    Issues:  Respiratory failure  Hypotension- septic shock  Pleural effusion  Esophageal leak              Seizure disorder    Home Medications:   * No Current Medications as of 20-Sep-2017 21:03 documented in Structured Notes  · 	aspirin-dipyridamole 25 mg-200 mg oral capsule, extended release: 1 cap(s) orally 2 times a day  · 	docusate sodium 100 mg oral capsule: 1 cap(s) orally 2 times a day  · 	ProAir HFA 90 mcg/inh inhalation aerosol: 2 puff(s) inhaled 4 times a day, As Needed   · 	Vitamin B-12 500 mcg oral tablet: 1 tab(s) orally once a day  · 	amLODIPine 5 mg oral tablet: 1 tab(s) orally once a day in pm  · 	simvastatin 40 mg oral tablet: 1 tab(s) orally once a day in pm  · 	tamsulosin 0.4 mg oral capsule: 1 cap(s) orally once a day in pm  · 	Zetia 10 mg oral tablet: 1 tab(s) orally once a day in pm  · 	ALPRAZolam 0.25 mg oral tablet: 2 tab(s) orally 2 times a day  · 	olmesartan 20 mg oral tablet: 1 tab(s) orally once a day in pm  · 	montelukast 10 mg oral tablet: 1 tab(s) orally once a day in pm  · 	Dilantin 100 mg oral capsule: 2 cap(s) orally 2 times a day  · 	predniSONE 5 mg oral tablet: 1 tab(s) orally once a day in am  · 	finasteride 5 mg oral tablet: 1 tab(s) orally once a day in pm  · 	folic acid 0.4 mg oral tablet: 1 tab(s) orally once a day  · 	Vitamin B6 100 mg oral tablet: 1 tab(s) orally once a day    MEDICATIONS  (STANDING):  cefepime  IVPB 1000 milliGRAM(s) IV Intermittent every 12 hours  vancomycin  IVPB 750 milliGRAM(s) IV Intermittent every 12 hours  metroNIDAZOLE  IVPB      aspirin  chewable 81 milliGRAM(s) Enteral Tube daily  clopidogrel Tablet 75 milliGRAM(s) Oral daily  phenylephrine    Infusion 0.3 MICROgram(s)/kG/Min (8.122 mL/Hr) IV Continuous <Continuous>  metroNIDAZOLE  IVPB 500 milliGRAM(s) IV Intermittent every 8 hours  chlorhexidine 0.12% Liquid 15 milliLiter(s) Swish and Spit five times a day  chlorhexidine 4% Liquid 1 Application(s) Topical daily  fosphenytoin IVPB 200 milliGRAM(s) PE IV Intermittent every 12 hours  folic acid Injectable 1 milliGRAM(s) IV Push daily  pyridoxine Injectable 100 milliGRAM(s) IV Push daily  cyanocobalamin Injectable 500 MICROGram(s) SubCutaneous daily  propofol Infusion 5 MICROgram(s)/kG/Min (2.166 mL/Hr) IV Continuous <Continuous>  fentaNYL   Infusion 1 MICROgram(s)/kG/Hr (7.22 mL/Hr) IV Continuous <Continuous>  pantoprazole  Injectable 40 milliGRAM(s) IV Push two times a day  methylPREDNISolone sodium succinate Injectable 20 milliGRAM(s) IV Push two times a day  ALBUTerol    90 MICROgram(s) HFA Inhaler 2 Puff(s) Inhalation every 6 hours  ipratropium 17 MICROgram(s) HFA Inhaler 1 Puff(s) Inhalation every 6 hours  micafungin IVPB 100 milliGRAM(s) IV Intermittent every 24 hours  micafungin IVPB      fluticasone propionate   220 MICROgram(s) HFA Inhaler 1 Puff(s) Inhalation two times a day  dexmedetomidine Infusion 0.2 MICROgram(s)/kG/Hr (3.61 mL/Hr) IV Continuous <Continuous>  sodium chloride 0.9%. 1000 milliLiter(s) (10 mL/Hr) IV Continuous <Continuous>    MEDICATIONS  (PRN):    ICU Vital Signs Last 24 Hrs  T(C): 37.5 (22 Sep 2017 08:00), Max: 37.5 (22 Sep 2017 08:00)  T(F): 99.5 (22 Sep 2017 08:00), Max: 99.5 (22 Sep 2017 08:00)  HR: 89 (22 Sep 2017 11:13) (64 - 89)  BP: 97/52 (22 Sep 2017 10:00) (82/43 - 121/65)  BP(mean): 64 (22 Sep 2017 10:00) (53 - 79)  ABP: 127/87 (22 Sep 2017 10:30) (80/38 - 135/66)  ABP(mean): 102 (22 Sep 2017 10:30) (52 - 128)  RR: 31 (22 Sep 2017 10:30) (16 - 31)  SpO2: 96% (22 Sep 2017 11:13) (96% - 100%)    Physical exam:                                                   General:               Pt is sedated on vent support.                                               Neuro:                  Sedated                           Cardiovascular:      S1 & S2, regular                           Respiratory:         Air entry is fair and equal on both sides, has bilateral conducted sounds                           GI:                       Soft, nondistended and nontender                            Ext:                      No cyanosis or edema   I&O's Summary    21 Sep 2017 07:01  -  22 Sep 2017 07:00  --------------------------------------------------------  IN: 4791.3 mL / OUT: 2075 mL / NET: 2716.3 mL    22 Sep 2017 07:01  -  22 Sep 2017 11:18  --------------------------------------------------------  IN: 598.4 mL / OUT: 310 mL / NET: 288.4 mL    Labs:                                                                      10.5   11.22 )-----------( 247      ( 22 Sep 2017 03:55 )             32.3                            09-22    139  |  109<H>  |  16  ----------------------------<  195<H>  4.0   |  19<L>  |  0.53    Ca    8.0<L>      22 Sep 2017 03:55  Phos  2.6     09-22  Mg     2.0     09-22    TPro  5.7<L>  /  Alb  1.9<L>  /  TBili  0.5  /  DBili  x   /  AST  153<H>  /  ALT  134<H>  /  AlkPhos  177<H>  09-21    CAPILLARY BLOOD GLUCOSE  119 (22 Sep 2017 07:00)    LIVER FUNCTIONS - ( 21 Sep 2017 00:20 )  Alb: 1.9 g/dL / Pro: 5.7 g/dL / ALK PHOS: 177 u/L / ALT: 134 u/L / AST: 153 u/L / GGT: x                                PT/INR - ( 21 Sep 2017 00:15 )   PT: 15.1 SEC;   INR: 1.34     PTT - ( 21 Sep 2017 00:15 )  PTT:28.2 SEC    Mode: CPAP with PS  FiO2: 30  PEEP: 5  PS: 5  MAP: 7  PIP: 11        ABG - ( 22 Sep 2017 03:55 )  pH: 7.38  /  pCO2: 38    /  pO2: 94    / HCO3: 23    / Base Excess: -2.2  /  SaO2: 98.0      CXR: 9/21/17  The endotracheal,left IJ line and right-sided chest tubes are unchanged.   Left lung is clear. Small layering right effusion unchanged and   loculated. No pneumothorax. No focal consolidations.    Plan:  82 M PMH Asthma, Anxiety, BCC skin, BPH, CVA, Chronic allergic rhinitis, HTN, HLD, RA, Sz DO, w recent esophagectomt 2/2 CA w malfunctioning J-tube.  The patienet presented to [facility] from a rehab center with non-functioning J tube and SOB. He had undergone a robotic-assisted [place]-Trent esophagogastrectomy with feeding J tube placement for esophageal adenocarcinoma (T1aN0) with Dr. Dyer and Dr. Christian Weinstein on 8/14/17. After surgery, patient was found to have an anastomotic leak and was treated conservatively with NPO, antibiotics, chest tube drainage, and J tube feeding. Patient was eventually discharged to rehab. On DOA, patient's J tube was unable to be used, and he was sent to the ED for evaluation. Patient also complained of SOB, but no chest pain or abdominal pain. Denies fever or chills. Patient was taking medications by mouth, but was only getting nutrition via the J tube. Esophagram done on 9/19/17 showing  a leak at the distal site just above the hemidiaphragm into the pleural space.   Patient was found to have a Left pleural effusion, underwent a pigtail placement, developed respiratory distress, was transferred to the SICU where he was intubated. Patient also with right GINA drain.  He was then  transferred to [facility]ICU for further management.   POD # 39 ([id number]): Natalbany Trent esophagectomy    Issues:  Respiratory failure  Hypotension- septic shock - Ryan  Pleural effusion  Esophageal leak              Seizure disorder                             Neuro:                                          Pain control with Fentanyl PRN                                         Precedex for CPAP trials                            Cardiovascular:                                          Continue hemodynamic monitoring.    Hypotension: Titrate Ryan to MAP>65. Continue IVF                            Respiratory:                                         Full mechanical ventilator support, RZ--40-5                                         CPAP trials                                           Monitor chest tube output                                         GINA to low continuos suction, L-Pigtail to pleurovac                                                                Continue bronchodilators, pulmonary toilet     CT -Chest/Abd showed loculated pleural effusion - pigtail catheter was inserted and drained 350 cc of serous fluid                            GI                                         NPO     EGD - contained perforation of stomach & leak near anastomosis site                                          Continue GI prophylaxis with Protonix                                         Restart J-tube feeds later	                                                                 Renal:                                         Continue D5LR 100cc/hr                                         Monitor I/Os and electrolytes                                         López                                                  Hem/ Onc:                                         Monitor chest tube output                                          Received 2 PRBCs                           Infectious disease:                                            Continue Vanco / Cefipime / Flagyl      PCR for C-dif: Non-reactive       Follow culture- of pleural effusion                            Endocrine                                             Continue Accu-Checks with coverage    All available pertinent clinical, laboratory, radiographic, hemodynamic, echocardiographic, respiratory data, microbiologic data and chart were reviewed and analyzed frequently throughout the course of the day and night  Patient seen, examined and plan discussed with CT Surgeon / CTICU team during rounds.    Pt's status discussed with family at bedside, updated status    I have spent 90 minutes of critical care time with this pt between  7 am and 7 pm    Aleksandr Hayden MD

## 2017-09-22 NOTE — PROGRESS NOTE ADULT - ASSESSMENT
82M esophageal ca s/p chemo/RT followed by [place]-Trent esophagogastrectomy with feeding J tube. Complicated by leak s/p IV antibiotics, NPO - J-tube feeds.   - Monitor vent settings  - Extubate when tolerating  - Care per CTICU

## 2017-09-22 NOTE — PROGRESS NOTE ADULT - SUBJECTIVE AND OBJECTIVE BOX
DX: Vance Trent with feeding J tube, c/b anastomotic leak, L pleural effusion s/p MI 9/16, s/p pigtail 9/19          SUBJECTIVE    NAEO. Patient had EGD yesterday which revealed 2 sites of perforation (esophagus and stomach), which are controlled leaks. Patient continues to be intubated, requiring pressors and sedation.    10-point review of systems completed and negative except as noted above.    cefepime  IVPB 1000 milliGRAM(s) IV Intermittent every 12 hours  vancomycin  IVPB 750 milliGRAM(s) IV Intermittent every 12 hours  metroNIDAZOLE  IVPB      aspirin  chewable 81 milliGRAM(s) Enteral Tube daily  clopidogrel Tablet 75 milliGRAM(s) Oral daily  phenylephrine    Infusion 0.3 MICROgram(s)/kG/Min IV Continuous <Continuous>  metroNIDAZOLE  IVPB 500 milliGRAM(s) IV Intermittent every 8 hours  chlorhexidine 0.12% Liquid 15 milliLiter(s) Swish and Spit five times a day  chlorhexidine 4% Liquid 1 Application(s) Topical daily  fosphenytoin IVPB 200 milliGRAM(s) PE IV Intermittent every 12 hours  folic acid Injectable 1 milliGRAM(s) IV Push daily  pyridoxine Injectable 100 milliGRAM(s) IV Push daily  cyanocobalamin Injectable 500 MICROGram(s) SubCutaneous daily  propofol Infusion 5 MICROgram(s)/kG/Min IV Continuous <Continuous>  fentaNYL   Infusion 1 MICROgram(s)/kG/Hr IV Continuous <Continuous>  dextrose 5% + sodium chloride 0.9%. 1000 milliLiter(s) IV Continuous <Continuous>  pantoprazole  Injectable 40 milliGRAM(s) IV Push two times a day  methylPREDNISolone sodium succinate Injectable 20 milliGRAM(s) IV Push two times a day  ALBUTerol    90 MICROgram(s) HFA Inhaler 2 Puff(s) Inhalation every 6 hours  ipratropium 17 MICROgram(s) HFA Inhaler 1 Puff(s) Inhalation every 6 hours  micafungin IVPB 100 milliGRAM(s) IV Intermittent every 24 hours  micafungin IVPB      fluticasone propionate   220 MICROgram(s) HFA Inhaler 1 Puff(s) Inhalation two times a day            OBJECTIVE    T(C): 36.9 (09-22-17 @ 04:00), Max: 36.9 (09-22-17 @ 04:00)  HR: 81 (09-22-17 @ 06:00) (64 - 85)  BP: 102/54 (09-22-17 @ 06:00) (82/43 - 121/65)  RR: 18 (09-22-17 @ 06:00) (16 - 21)  SpO2: 96% (09-22-17 @ 06:00) (96% - 100%)    09-21-17 @ 07:01  -  09-22-17 @ 07:00  --------------------------------------------------------  IN: 4720.4 mL / OUT: 1920 mL / NET: 2800.4 mL        EXAM  General: Laying in bed, sedated  Respiratory:  Intubated. CT chest in place. GINA in place, on suction. NGT in place  GI:  soft, non-tender, normal bowel sounds  :  garza      LABS                        10.5   11.22 )-----------( 247      ( 22 Sep 2017 03:55 )             32.3     09-22    139  |  109<H>  |  16  ----------------------------<  195<H>  4.0   |  19<L>  |  0.53    Ca    8.0<L>      22 Sep 2017 03:55  Phos  2.6     09-22  Mg     2.0     09-22    TPro  5.7<L>  /  Alb  1.9<L>  /  TBili  0.5  /  DBili  x   /  AST  153<H>  /  ALT  134<H>  /  AlkPhos  177<H>  09-21    PT/INR - ( 21 Sep 2017 00:15 )   PT: 15.1 SEC;   INR: 1.34          PTT - ( 21 Sep 2017 00:15 )  PTT:28.2 SEC

## 2017-09-22 NOTE — PROGRESS NOTE ADULT - SUBJECTIVE AND OBJECTIVE BOX
f/u anastomotic leak and loculated R pleural effusion    Allergies  penicillin (Rash)    ANTIMICROBIALS:    cefepime  IVPB 1000 every 12 hours - started 9/15-  vancomycin  IVPB 750 every 12 hours - started 9/15-  vancomycin    Solution 125 every 6 hours - started   metroNIDAZOLE  IVPB 500 every 8 hours - started 9/15-  micafungin IVPB - started 9/21    MEDICATIONS  (STANDING):  aspirin  chewable 81 daily  clopidogrel Tablet 75 daily  phenylephrine    Infusion 0.3 <Continuous>  fosphenytoin IVPB 200 every 12 hours  propofol Infusion 5 <Continuous>  fentaNYL   Infusion 1 <Continuous>  pantoprazole  Injectable 40 two times a day  methylPREDNISolone sodium succinate Injectable 20 two times a day  ALBUTerol    90 MICROgram(s) HFA Inhaler 2 every 6 hours  ipratropium 17 MICROgram(s) HFA Inhaler 1 every 6 hours  fluticasone propionate   220 MICROgram(s) HFA Inhaler 1 two times a day    Vital Signs Last 24 Hrs  T(F): 99.5 (09-22-17 @ 08:00), Max: 99.5 (09-22-17 @ 08:00)  HR: 82 (09-22-17 @ 09:00)  BP: 95/51 (09-22-17 @ 09:00)  RR: 24 (09-22-17 @ 09:00)  SpO2: 96% (09-22-17 @ 09:00) (96% - 100%)  Wt(kg): --    PHYSICAL EXAM:                              10.5   11.22 )-----------( 247      ( 22 Sep 2017 03:55 )             32.3 09-22    139  |  109  |  16  ----------------------------<  195  4.0   |  19  |  0.53  Ca    8.0      22 Sep 2017 03:55Phos  2.6     09-22Mg     2.0     09-22  TPro  5.7  /  Alb  1.9  /  TBili  0.5  /  DBili  x   /  AST  153  /  ALT  134  /  AlkPhos  177  09-21    Vancomycin Level, Random: 12.7 ug/mL (09-21-17 @ 04:55)    MICROBIOLOGY:  Culture - Body Fluid with Gram Stain (09.21.17 @ 21:18)    Gram Stain:  WBC^White Blood Cells QNTY CELLS IN GRAM STAIN: NO CELLS SEEN   NOS^No Organisms Seen    Specimen Source: PLEURAL FLUID    Culture - Blood (09.21.17 @ 02:01)    -  Coagulase negative Staphylococcus: + DETECT ZOË  AFTER: 26 HOURS INCUBATION  BOTTLE: ANAEROBIC BOTTLE    Culture - Blood (09.21.17 @ 02:01)  GPCCL^Gram Pos Cocci In Clusters  AFTER: 19 HOURS INCUBATION  BOTTLE: AEROBIC BOTTLE    C Diff by PCR Result: NotDetec (09.20.17 @ 07:34)    Culture - Body Fluid with Gram Stain (09.17.17 @ 12:42)    Specimen Source: Pleural Fl Other, Pleural Fluid    Culture Results:   No growth to date.    Specimen Source: .Blood Blood (09.18.17 @ 08:34)    Culture - Body Fluid with Gram Stain (09.17.17 @ 12:42)  Pleural Fl Other, Pleural Fluid  No growth to date.    Specimen Source: .Blood Blood-Venous (09.16.17 @ 15:30)  Culture Results:   No growth at 5 days. (09.16.17 @ 15:30)    Culture - Body Fluid with Gram Stain (09.16.17 @ 00:58)  .Body Fluid Pleural Fluid    Culture Results:   No growth at 5 days    Specimen Source: .Blood Blood-Venous (09.15.17 @ 07:47)  Negative    Culture - Respiratory with Gram Stain (08.20.17 @ 16:47)    Gram Stain Sputum:     Specimen Source: SPUTUM    Organism Identification: Stenotrophomonas maltophilia    Organism: Stenotrophomonas maltophilia    RADIOLOGY:  CT Abdomen and Pelvis w/ IV Cont (09.21.17 @ 12:50)   IMPRESSION:   Status post esophagectomy and gastric pull-through with adjacent extraluminal contrast consistent with known leak.  Tracheostomy tube in place.  Moderate sized pleural effusions with loculations.  Evidence of pericarditis with small to moderate-sized pericardial effusion.  J-tube in place.  Small volume ascites. Anasarca.    Xray Esophagram (09.19.17 @ 12:08)   IMPRESSION:  Status post Vance Trent. Findings as described above consistent with a leak at the distal site just above the hemidiaphragm into the pleural space.       Transthoracic Echocardiogram (09.18.17 @ 10:15) >  CONCLUSIONS:  1. Mitral annular calcification and calcified mitral leaflets with normal diastolic opening.  2. Calcified aortic valve with decreased opening.  3. Low Normal left ventricular systolic function. EF 50%  4. The right ventricle is not well visualized; grossly normal right ventricular systolic function.  5. Thickened  pericardium with small  0.9cm circumferential pericardial effusion. There is evidence of raised intra pericardial pressure with RA buckling however no  tamponade seen. f/u anastomotic leak and loculated R pleural effusion    interval history/ROS:  tolerating CPAP.  low dose pressors.  Wife at bedside.  R Chest Tube placed.  Unable to take ROS as patient intubated/sedated    Allergies  penicillin (Rash)    ANTIMICROBIALS:    cefepime  IVPB 1000 every 12 hours - started 9/15-  vancomycin  IVPB 750 every 12 hours - started 9/15-  vancomycin    Solution 125 every 6 hours - started   metroNIDAZOLE  IVPB 500 every 8 hours - started 9/15-  micafungin IVPB - started 9/21    MEDICATIONS  (STANDING):  aspirin  chewable 81 daily  clopidogrel Tablet 75 daily  phenylephrine    Infusion 0.3 <Continuous>  fosphenytoin IVPB 200 every 12 hours  propofol Infusion 5 <Continuous>  fentaNYL   Infusion 1 <Continuous>  pantoprazole  Injectable 40 two times a day  methylPREDNISolone sodium succinate Injectable 20 two times a day  ALBUTerol    90 MICROgram(s) HFA Inhaler 2 every 6 hours  ipratropium 17 MICROgram(s) HFA Inhaler 1 every 6 hours  fluticasone propionate   220 MICROgram(s) HFA Inhaler 1 two times a day  dexmedetomidine Infusion 0.2 <Continuous>    Vital Signs Last 24 Hrs  T(F): 99.5 (09-22-17 @ 08:00), Max: 99.5 (09-22-17 @ 08:00)  HR: 82 (09-22-17 @ 09:00)  BP: 95/51 (09-22-17 @ 09:00)  RR: 24 (09-22-17 @ 09:00)  SpO2: 96% (09-22-17 @ 09:00) (96% - 100%)  Wt(kg): --    PHYSICAL EXAM:  General: non-toxic; intubated  HEAD/EYES: eyes are closed  ENT:  supple  Cardiovascular:   S1, S2  Respiratory:  clear bilaterally; GINA changed to R chest tube  GI:  soft, non-tender, normal bowel sounds  :  garza  Musculoskeletal:  no synovitis  Neurologic:  sedated  Skin:  no rash  Lymph: no lymphadenopathy  Psychiatric:  sedated  Vascular:  L IJ; L Silvana abigail; several PIV no phlebitis                        10.5   11.22 )-----------( 247      ( 22 Sep 2017 03:55 )             32.3 09-22    139  |  109  |  16  ----------------------------<  195  4.0   |  19  |  0.53  Ca    8.0      22 Sep 2017 03:55Phos  2.6     09-22Mg     2.0     09-22  TPro  5.7  /  Alb  1.9  /  TBili  0.5  /  DBili  x   /  AST  153  /  ALT  134  /  AlkPhos  177  09-21    Vancomycin Level, Random: 12.7 ug/mL (09-21-17 @ 04:55)    MICROBIOLOGY:  Culture - Body Fluid with Gram Stain (09.21.17 @ 21:18)    Gram Stain:  WBC^White Blood Cells QNTY CELLS IN GRAM STAIN: NO CELLS SEEN   NOS^No Organisms Seen    Specimen Source: PLEURAL FLUID    Culture - Blood (09.21.17 @ 02:01)  Coagulase negative Staphylococcus: + DETECT ZOË  AFTER: 26 HOURS INCUBATION    Culture - Blood (09.21.17 @ 02:01)  GPCCL^Gram Pos Cocci In Clusters  AFTER: 19 HOURS INCUBATION  BOTTLE: AEROBIC BOTTLE    C Diff by PCR Result: NotDetec (09.20.17 @ 07:34)    Culture - Body Fluid with Gram Stain (09.17.17 @ 12:42)    Specimen Source: Pleural Fl Other, Pleural Fluid    Culture Results:   No growth to date.    Specimen Source: .Blood Blood (09.18.17 @ 08:34)    Culture - Body Fluid with Gram Stain (09.17.17 @ 12:42)  Pleural Fl Other, Pleural Fluid  No growth to date.    Specimen Source: .Blood Blood-Venous (09.16.17 @ 15:30)  Culture Results:   No growth at 5 days. (09.16.17 @ 15:30)    Culture - Body Fluid with Gram Stain (09.16.17 @ 00:58)  .Body Fluid Pleural Fluid    Culture Results:   No growth at 5 days    Specimen Source: .Blood Blood-Venous (09.15.17 @ 07:47)  Negative    Culture - Respiratory with Gram Stain (08.20.17 @ 16:47)    Gram Stain Sputum:     Specimen Source: SPUTUM    Organism Identification: Stenotrophomonas maltophilia    Organism: Stenotrophomonas maltophilia    RADIOLOGY:  CT Abdomen and Pelvis w/ IV Cont (09.21.17 @ 12:50)   IMPRESSION:   Status post esophagectomy and gastric pull-through with adjacent extraluminal contrast consistent with known leak.  Tracheostomy tube in place.  Moderate sized pleural effusions with loculations.  Evidence of pericarditis with small to moderate-sized pericardial effusion.  J-tube in place.  Small volume ascites. Anasarca.    Xray Esophagram (09.19.17 @ 12:08)   IMPRESSION:  Status post Vance Trent. Findings as described above consistent with a leak at the distal site just above the hemidiaphragm into the pleural space.       Transthoracic Echocardiogram (09.18.17 @ 10:15) >  CONCLUSIONS:  1. Mitral annular calcification and calcified mitral leaflets with normal diastolic opening.  2. Calcified aortic valve with decreased opening.  3. Low Normal left ventricular systolic function. EF 50%  4. The right ventricle is not well visualized; grossly normal right ventricular systolic function.  5. Thickened  pericardium with small  0.9cm circumferential pericardial effusion. There is evidence of raised intra pericardial pressure with RA buckling however no  tamponade seen.

## 2017-09-22 NOTE — PROGRESS NOTE ADULT - ASSESSMENT
82 M  RA-chronic steroids, copd/asthma, cad-recent stents, MI, SHANA esophagectomy. Now with respiratory failure, L pleural effusion=exudate, R chest drain -not functioning, and esophageal leak-R

## 2017-09-22 NOTE — PROGRESS NOTE ADULT - SUBJECTIVE AND OBJECTIVE BOX
Follow-up Pulm Progress Note    pt seen at aprox 8am today. intubated,sedated, on pressors  d/w daughter at bedside  s/p 9/21- EGD=  	Perforation in esophagus at approximately 28cm from incisors.  Perforation in stomach at approximately 40cm from incisors.	  s/p 9/21-R chest tube -350cc cloudy serous fluid    Medications:  MEDICATIONS  (STANDING):  cefepime  IVPB 1000 milliGRAM(s) IV Intermittent every 12 hours  vancomycin  IVPB 750 milliGRAM(s) IV Intermittent every 12 hours  metroNIDAZOLE  IVPB      aspirin  chewable 81 milliGRAM(s) Enteral Tube daily  clopidogrel Tablet 75 milliGRAM(s) Oral daily  phenylephrine    Infusion 0.3 MICROgram(s)/kG/Min (8.122 mL/Hr) IV Continuous <Continuous>  metroNIDAZOLE  IVPB 500 milliGRAM(s) IV Intermittent every 8 hours  chlorhexidine 0.12% Liquid 15 milliLiter(s) Swish and Spit five times a day  chlorhexidine 4% Liquid 1 Application(s) Topical daily  fosphenytoin IVPB 200 milliGRAM(s) PE IV Intermittent every 12 hours  folic acid Injectable 1 milliGRAM(s) IV Push daily  pyridoxine Injectable 100 milliGRAM(s) IV Push daily  cyanocobalamin Injectable 500 MICROGram(s) SubCutaneous daily  propofol Infusion 5 MICROgram(s)/kG/Min (2.166 mL/Hr) IV Continuous <Continuous>  fentaNYL   Infusion 1 MICROgram(s)/kG/Hr (7.22 mL/Hr) IV Continuous <Continuous>  pantoprazole  Injectable 40 milliGRAM(s) IV Push two times a day  methylPREDNISolone sodium succinate Injectable 20 milliGRAM(s) IV Push two times a day  ALBUTerol    90 MICROgram(s) HFA Inhaler 2 Puff(s) Inhalation every 6 hours  ipratropium 17 MICROgram(s) HFA Inhaler 1 Puff(s) Inhalation every 6 hours  micafungin IVPB 100 milliGRAM(s) IV Intermittent every 24 hours  micafungin IVPB      fluticasone propionate   220 MICROgram(s) HFA Inhaler 1 Puff(s) Inhalation two times a day  dexmedetomidine Infusion 0.2 MICROgram(s)/kG/Hr (3.61 mL/Hr) IV Continuous <Continuous>  sodium chloride 0.9%. 1000 milliLiter(s) (10 mL/Hr) IV Continuous <Continuous>    MEDICATIONS  (PRN):      Mode: CPAP with PS  FiO2: 30  PEEP: 5  PS: 5  MAP: 7  PIP: 11      Vital Signs Last 24 Hrs  T(C): 37.5 (22 Sep 2017 08:00), Max: 37.5 (22 Sep 2017 08:00)  T(F): 99.5 (22 Sep 2017 08:00), Max: 99.5 (22 Sep 2017 08:00)  HR: 89 (22 Sep 2017 11:13) (64 - 89)  BP: 97/52 (22 Sep 2017 10:00) (82/43 - 121/65)  BP(mean): 64 (22 Sep 2017 10:00) (53 - 79)  RR: 31 (22 Sep 2017 10:30) (16 - 31)  SpO2: 96% (22 Sep 2017 11:13) (96% - 100%)    ABG - ( 22 Sep 2017 03:55 )  pH: 7.38  /  pCO2: 38    /  pO2: 94    / HCO3: 23    / Base Excess: -2.2  /  SaO2: 98.0              VBG pH 7.37 09-21 @ 00:15    VBG pCO2 45 09-21 @ 00:15    VBG O2 sat 80.0 09-21 @ 00:15    VBG lactate 1.4 09-21 @ 00:15      09-21 @ 07:01  -  09-22 @ 07:00  --------------------------------------------------------  IN: 4791.3 mL / OUT: 2075 mL / NET: 2716.3 mL          LABS:                        10.5   11.22 )-----------( 247      ( 22 Sep 2017 03:55 )             32.3     09-22    139  |  109<H>  |  16  ----------------------------<  195<H>  4.0   |  19<L>  |  0.53    Ca    8.0<L>      22 Sep 2017 03:55  Phos  2.6     09-22  Mg     2.0     09-22    TPro  5.7<L>  /  Alb  1.9<L>  /  TBili  0.5  /  DBili  x   /  AST  153<H>  /  ALT  134<H>  /  AlkPhos  177<H>  09-21      CARDIAC MARKERS ( 20 Sep 2017 20:43 )  x     / 0.34 ng/mL / 57 U/L / x     / 5.7 ng/mL      CAPILLARY BLOOD GLUCOSE  119 (22 Sep 2017 07:00)        PT/INR - ( 21 Sep 2017 00:15 )   PT: 15.1 SEC;   INR: 1.34          PTT - ( 21 Sep 2017 00:15 )  PTT:28.2 SEC      Serum Pro-Brain Natriuretic Peptide: 5356 pg/mL (20 Sep 2017 03:18)            Fluid characteristics  -- 09-17 @ 08:09  pH >8    tprot 1.9    Cell count  Appearance Cloudy  Fluid type --  BF lymph 9  color Yellow  eosinophil 2  PMN 55  Mesothelial --  Monocyte 34  Other body cells --  Fluid characteristics  -- 09-15 @ 20:21  pH --    tprot 3.3    Cell count  Appearance --  Fluid type --  BF lymph --  color --  eosinophil --  PMN --  Mesothelial --  Monocyte --  Other body cells --      CULTURES: (if applicable)  C Diff by PCR Result: NotDetec (09-20 @ 07:34)    Culture - Body Fluid with Gram Stain (09.21.17 @ 21:18)    Gram Stain:   WBC^White Blood Cells  QNTY CELLS IN GRAM STAIN: NO CELLS SEEN  NOS^No Organisms Seen    Specimen Source: PLEURAL FLUID    Culture - Blood (09.21.17 @ 02:01)    Culture - Blood:   NO ORGANISMS ISOLATED  NO ORGANISMS ISOLATED AT 24 HOURS    Culture - Blood:    -  Coagulase negative Staphylococcus: + DETECT ZOË  GPCCL^Gram Pos Cocci In Clusters  AFTER: 26 HOURS INCUBATION  BOTTLE: ANAEROBIC BOTTLE    Organism Identification: BLOOD CULTURE PCR    Method Type: PCR  Culture - Blood (09.21.17 @ 02:01)    Culture - Blood:   NO ORGANISMS ISOLATED  NO ORGANISMS ISOLATED AT 24 HOURS    Specimen Source: BLOOD    Gram Stain Blood:   ***** CRITICAL RESULT *****  PERSON CALLED / READ-BACK: AYUSH PERKINS/Y  DATE / TIME CALLED: 09/22/17 0655  CALLED BY: NIKKI CHAN                *******************************                * This is an appended result. *      *******************************  A prior result that was reported as final has been changed.  GPCCL^Gram Pos Cocci In Clusters  AFTER: 19 HOURS INCUBATION  BOTTLE: AEROBIC BOTTLE            Physical Examination:  PULM: decreased bs bilaterally, no significant sputum production  CVS: S1, S2 heard    RADIOLOGY REVIEWED  CXR:     CT chest:< from: CT Chest w/ IV Cont (09.21.17 @ 12:50) >  PROCEDURE DATE:  Sep 21 2017         INTERPRETATION:  CLINICAL INFORMATION: Status post I[place] Trent   esophagectomy. Septic shock.    COMPARISON: Chest CT from September 15, 2017    PROCEDURE:   CT of the Chest, Abdomen and Pelvis was performed with intravenous   contrast.   Intravenous contrast: 90 ml Omnipaque 350. 10 ml discarded.  Oral contrast: positive contrast was administered.  Sagittal and coronal reformats were performed.    FINDINGS:    CHEST:     LUNGS AND LARGE AIRWAYS: Patent central airways. Tracheostomy tube in   place. No pulmonary nodules.  PLEURA: Right pleural drainage catheter. Moderate-sized bilateral   loculated pleural effusions, right greater than left.  VESSELS: Within normal limits.  HEART: Heart size is normal. Small to moderate-sized pericardial effusion   with pericardial thickening.  MEDIASTINUM AND LAURA: No lymphadenopathy. Status post esophagectomy and   gastric pull-through. Right posterior extraluminal contrast consistent   with known leak.  CHEST WALL AND LOWER NECK: 1.4 cm hypodense right thyroid nodule.    ABDOMEN AND PELVIS:    LIVER: Within normal limits.  BILE DUCTS: Normal caliber.  GALLBLADDER: Within normal limits.  SPLEEN: Within normal limits.  PANCREAS: Within normal limits.  ADRENALS: Within normal limits.  KIDNEYS/URETERS: Cysts.    BLADDER: Collapsed around López catheter.  REPRODUCTIVE ORGANS: Prostate and seminal vesicles normal.    BOWEL: Left-sided J-tube. No bowelobstruction. Appendix normal. Colonic   diverticulosis.  PERITONEUM: Small volume ascites.  VESSELS:  Within normal limits.  RETROPERITONEUM: No lymphadenopathy.    ABDOMINAL WALL: Anasarca. Right inguinal hernia containing fluid.  BONES: Several vertebral body compression deformities severe at L1 and L4   and mild multilevel.    < end of copied text >      TTE:

## 2017-09-22 NOTE — CONSULT NOTE ADULT - SUBJECTIVE AND OBJECTIVE BOX
Date of Admission: 9/20/17     Patient is a 82y old  Male who presents with a chief complaint of feeding tube clogged s/p maryjane ford (21 Sep 2017 04:19)      HISTORY OF PRESENT ILLNESS:     81 yo gentleman w/PMHx CAD, bare metal stents to LAD placed in June 2017 and balloon angioplasty to D2, hyperlipidemia, BPH, HTN, asthma, CVA, anxiety s/p esophagogastrectomy last month, recently had left pleural effusion drained via pigtail catheter, and found to have NSTEMI in the setting of sepsis likly from his surgical leak, possible cdiff.  overnight was agitated, confused, tachypnic this am.  Patient denies chest pain, continued tachycardia on telemetry troponin roughly stable in past 24hrs, Hb rending down. sepsis continued to require pressors. Continue medical management for NSTEMI      Allergies    penicillin (Rash)    Intolerances    	    MEDICATIONS:  phenylephrine    Infusion 0.3 MICROgram(s)/kG/Min IV Continuous <Continuous>    cefepime  IVPB 1000 milliGRAM(s) IV Intermittent every 12 hours  vancomycin  IVPB 750 milliGRAM(s) IV Intermittent every 12 hours  metroNIDAZOLE  IVPB      metroNIDAZOLE  IVPB 500 milliGRAM(s) IV Intermittent every 8 hours  micafungin IVPB 100 milliGRAM(s) IV Intermittent every 24 hours  micafungin IVPB        ALBUTerol    90 MICROgram(s) HFA Inhaler 2 Puff(s) Inhalation every 6 hours  ipratropium 17 MICROgram(s) HFA Inhaler 1 Puff(s) Inhalation every 6 hours  fluticasone propionate   220 MICROgram(s) HFA Inhaler 1 Puff(s) Inhalation two times a day    fosphenytoin IVPB 200 milliGRAM(s) PE IV Intermittent every 12 hours  propofol Infusion 5 MICROgram(s)/kG/Min IV Continuous <Continuous>  fentaNYL   Infusion 1 MICROgram(s)/kG/Hr IV Continuous <Continuous>  dexmedetomidine Infusion 0.2 MICROgram(s)/kG/Hr IV Continuous <Continuous>    pantoprazole  Injectable 40 milliGRAM(s) IV Push two times a day    methylPREDNISolone sodium succinate Injectable 20 milliGRAM(s) IV Push two times a day    chlorhexidine 0.12% Liquid 15 milliLiter(s) Swish and Spit five times a day  chlorhexidine 4% Liquid 1 Application(s) Topical daily  folic acid Injectable 1 milliGRAM(s) IV Push daily  pyridoxine Injectable 100 milliGRAM(s) IV Push daily  cyanocobalamin Injectable 500 MICROGram(s) SubCutaneous daily  sodium chloride 0.9%. 1000 milliLiter(s) IV Continuous <Continuous>      PAST MEDICAL & SURGICAL HISTORY:  Coronary artery disease: s/p 3 stents on June 30, 2017 at [place] Dr. Lavelle Reid  OA (osteoarthritis) of knee: right  Former smoker, stopped smoking in distant past  Rheumatoid arthritis  Seizure disorder: 1 episode approximately 15 yrs ago  Asthma  Hyperlipidemia  BPH (benign prostatic hyperplasia)  HTN (hypertension)  Esophagus cancer  Chronic allergic rhinitis  BCC (basal cell carcinoma): skin  Cerebrovascular accident (CVA)  Anxiety  Anastomotic leak following esophagectomy: [place] [facility] esophagectomy  History of tonsillectomy  H/O heart artery stent: June 30, 2017  Knee arthropathy: left  History of total hip replacement: left  History of hernia repair      FAMILY HISTORY:  Family history of heart attack (Sibling)  Family history of pulmonary embolism: father @ 49 yr old  FH: CAD (coronary artery disease)      SOCIAL HISTORY:    [ ] Non-smoker  [ ] Smoker  [ ] Alcohol      REVIEW OF SYSTEMS:  See HPI. Otherwise, 10 point ROS done and otherwise negative.    PHYSICAL EXAM:  T(C): 37.7 (09-22-17 @ 12:00), Max: 37.7 (09-22-17 @ 12:00)  HR: 84 (09-22-17 @ 16:01) (64 - 89)  BP: 94/56 (09-22-17 @ 15:00) (82/43 - 121/65)  RR: 27 (09-22-17 @ 15:00) (16 - 32)  SpO2: 95% (09-22-17 @ 16:01) (95% - 100%)  Wt(kg): --  I&O's Summary    21 Sep 2017 07:01  -  22 Sep 2017 07:00  --------------------------------------------------------  IN: 4791.3 mL / OUT: 2075 mL / NET: 2716.3 mL    22 Sep 2017 07:01  -  22 Sep 2017 16:15  --------------------------------------------------------  IN: 1454.8 mL / OUT: 655 mL / NET: 799.8 mL        Appearance: Normal	  HEENT:   Normal oral mucosa, PERRL, EOMI	  Lymphatic: No lymphadenopathy  Cardiovascular: Normal S1 S2, No JVD, No murmurs, No edema  Respiratory: Lungs clear to auscultation	  Psychiatry: A & O x 3, Mood & affect appropriate  Gastrointestinal:  Soft, Non-tender, + BS	  Skin: No rashes, No ecchymoses, No cyanosis	  Neurologic: Non-focal  Extremities: Normal range of motion, No clubbing, cyanosis or edema  Vascular: Peripheral pulses palpable 2+ bilaterally        LABS:	 	    CBC Full  -  ( 22 Sep 2017 03:55 )  WBC Count : 11.22 K/uL  Hemoglobin : 10.5 g/dL  Hematocrit : 32.3 %  Platelet Count - Automated : 247 K/uL  Mean Cell Volume : 90.7 fL  Mean Cell Hemoglobin : 29.5 pg  Mean Cell Hemoglobin Concentration : 32.5 %    09-22    139  |  109<H>  |  16  ----------------------------<  195<H>  4.0   |  19<L>  |  0.53  09-21    143  |  111<H>  |  23  ----------------------------<  118<H>  3.8   |  21<L>  |  0.51    Ca    8.0<L>      22 Sep 2017 03:55  Ca    8.1<L>      21 Sep 2017 06:55  Phos  2.6     09-22  Phos  2.8     09-21  Mg     2.0     09-22  Mg     2.1     09-21    TPro  5.7<L>  /  Alb  1.9<L>  /  TBili  0.5  /  DBili  x   /  AST  153<H>  /  ALT  134<H>  /  AlkPhos  177<H>  09-21      proBNP:   Lipid Profile:   HgA1c:   TSH:       CARDIAC MARKERS:            TELEMETRY: 	    ECG:  	  RADIOLOGY:  OTHER: 	    PREVIOUS DIAGNOSTIC TESTING:    [ ] Echocardiogram:  [ ]  Catheterization:  [ ] Stress Test:  	  	  ASSESSMENT/PLAN: Date of Admission: 17     Patient is a 82y old  Male who presents with a chief complaint of feeding tube clogged s/p maryjane trent (21 Sep 2017 04:19)      HISTORY OF PRESENT ILLNESS:     81 yo gentleman w/PMHx robotic-assisted Maryjane-Trent esophagogastrectomy with feeding J tube placement for esophageal adenocarcinoma (T1aN0) with Dr. Dyer and Dr. Christian Weinstein on 17, CAD, bare metal stents to LAD placed in 2017 and balloon angioplasty to D2, hyperlipidemia, BPH, HTN, asthma, CVA, anxiety s/p esophagogastrectomy last month, recently had left pleural effusion drained via pigtail catheter, and found to have NSTEMI in the setting of sepsis thought to be likely from his surgical leak transferred to [facility] on  for continued surgical care per Dr. Weinstein with an EGD yesterday confirmed 2 contained perforation sites cardiology now consulted given recent NSTEMI.     Patient continues to be intubated, requiring pressors and sedation.     Allergies    penicillin (Rash)    Intolerances    	    MEDICATIONS:  phenylephrine    Infusion 0.3 MICROgram(s)/kG/Min IV Continuous <Continuous>    cefepime  IVPB 1000 milliGRAM(s) IV Intermittent every 12 hours  vancomycin  IVPB 750 milliGRAM(s) IV Intermittent every 12 hours  metroNIDAZOLE  IVPB      metroNIDAZOLE  IVPB 500 milliGRAM(s) IV Intermittent every 8 hours  micafungin IVPB 100 milliGRAM(s) IV Intermittent every 24 hours  micafungin IVPB        ALBUTerol    90 MICROgram(s) HFA Inhaler 2 Puff(s) Inhalation every 6 hours  ipratropium 17 MICROgram(s) HFA Inhaler 1 Puff(s) Inhalation every 6 hours  fluticasone propionate   220 MICROgram(s) HFA Inhaler 1 Puff(s) Inhalation two times a day    fosphenytoin IVPB 200 milliGRAM(s) PE IV Intermittent every 12 hours  propofol Infusion 5 MICROgram(s)/kG/Min IV Continuous <Continuous>  fentaNYL   Infusion 1 MICROgram(s)/kG/Hr IV Continuous <Continuous>  dexmedetomidine Infusion 0.2 MICROgram(s)/kG/Hr IV Continuous <Continuous>    pantoprazole  Injectable 40 milliGRAM(s) IV Push two times a day    methylPREDNISolone sodium succinate Injectable 20 milliGRAM(s) IV Push two times a day    chlorhexidine 0.12% Liquid 15 milliLiter(s) Swish and Spit five times a day  chlorhexidine 4% Liquid 1 Application(s) Topical daily  folic acid Injectable 1 milliGRAM(s) IV Push daily  pyridoxine Injectable 100 milliGRAM(s) IV Push daily  cyanocobalamin Injectable 500 MICROGram(s) SubCutaneous daily  sodium chloride 0.9%. 1000 milliLiter(s) IV Continuous <Continuous>      PAST MEDICAL & SURGICAL HISTORY:  Coronary artery disease: s/p 3 stents on 2017 at [place] Dr. Lavelle Reid  OA (osteoarthritis) of knee: right  Former smoker, stopped smoking in distant past  Rheumatoid arthritis  Seizure disorder: 1 episode approximately 15 yrs ago  Asthma  Hyperlipidemia  BPH (benign prostatic hyperplasia)  HTN (hypertension)  Esophagus cancer  Chronic allergic rhinitis  BCC (basal cell carcinoma): skin  Cerebrovascular accident (CVA)  Anxiety  Anastomotic leak following esophagectomy: [place] trent esophagectomy  History of tonsillectomy  H/O heart artery stent: 2017  Knee arthropathy: left  History of total hip replacement: left  History of hernia repair      FAMILY HISTORY:  Family history of heart attack (Sibling)  Family history of pulmonary embolism: father @ 49 yr old  FH: CAD (coronary artery disease)      SOCIAL HISTORY:    Former smoker, stopped smoking in distant past      REVIEW OF SYSTEMS:  See HPI. Otherwise, 10 point ROS done and otherwise negative.    PHYSICAL EXAM:  T(C): 37.7 (17 @ 12:00), Max: 37.7 (17 @ 12:00)  HR: 84 (17 @ 16:01) (64 - 89)  BP: 94/56 (17 @ 15:00) (82/43 - 121/65)  RR: 27 (17 @ 15:00) (16 - 32)  SpO2: 95% (17 @ 16:01) (95% - 100%)  Wt(kg): --  I&O's Summary    21 Sep 2017 07:  -  22 Sep 2017 07:00  --------------------------------------------------------  IN: 4791.3 mL / OUT: 2075 mL / NET: 2716.3 mL    22 Sep 2017 07:01  -  22 Sep 2017 16:15  --------------------------------------------------------  IN: 1454.8 mL / OUT: 655 mL / NET: 799.8 mL        Appearance: Normal	  HEENT:   Normal oral mucosa, PERRL, EOMI	  Lymphatic: No lymphadenopathy  Cardiovascular: Normal S1 S2, No JVD, No murmurs, No edema  Respiratory: Lungs clear to auscultation	  Psychiatry: A & O x 3, Mood & affect appropriate  Gastrointestinal:  Soft, Non-tender, + BS	  Skin: No rashes, No ecchymoses, No cyanosis	  Neurologic: Non-focal  Extremities: Normal range of motion, No clubbing, cyanosis or edema  Vascular: Peripheral pulses palpable 2+ bilaterally      LABS:	 	    CBC Full  -  ( 22 Sep 2017 03:55 )  WBC Count : 11.22 K/uL  Hemoglobin : 10.5 g/dL  Hematocrit : 32.3 %  Platelet Count - Automated : 247 K/uL  Mean Cell Volume : 90.7 fL  Mean Cell Hemoglobin : 29.5 pg  Mean Cell Hemoglobin Concentration : 32.5 %        139  |  109<H>  |  16  ----------------------------<  195<H>  4.0   |  19<L>  |  0.53      143  |  111<H>  |  23  ----------------------------<  118<H>  3.8   |  21<L>  |  0.51    Ca    8.0<L>      22 Sep 2017 03:55  Ca    8.1<L>      21 Sep 2017 06:55  Phos  2.6       Phos  2.8       Mg     2.0       Mg     2.1         TPro  5.7<L>  /  Alb  1.9<L>  /  TBili  0.5  /  DBili  x   /  AST  153<H>  /  ALT  134<H>  /  AlkPhos  177<H>        TELEMETRY: 	    ECG:  	  	    PREVIOUS DIAGNOSTIC TESTING:    [ ] Echocardiogram: < from: Transthoracic Echocardiogram (17 @ 15:29) >  CONCLUSIONS:  1. Mitral annularcalcification, otherwise normal mitral  valve. Minimal mitral regurgitation.  2. Aortic valve leaflet morphology not well visualized.  Peak transaortic valve gradient equals 27 mm Hg, mean  transaortic valve gradient equals 12 mm Hg, consistent with  probable mild aortic stenosis. Mild aortic regurgitation.  3. Endocardium not well visualized; grossly normal left  ventricular systolic function.  4. The right ventricle is not well visualized; grossly  normal right ventricular systolic function.  5. Estimated right ventricular systolic pressure equals 44  mm Hg, assuming right atrial pressure equals 10 mm Hg,  consistent with mild pulmonary hypertension.    [ ]  Catheterization: < from: Cardiac Cath Lab - Adult (17 @ 19:06) >  CORONARY VESSELS: The coronary circulation is left dominant.  LM:   --  LM: Normal.  LAD:   --  LAD: Normal. There was no significant restenosis.  --  D2: There was a 95 % stenosis.  CX:   --  Circumflex: Normal.  RCA:   --  Mid RCA: There was a 40 % stenosis.  COMPLICATIONS: There were no complications.  INTERVENTIONAL RECOMMENDATIONS: ASA  Prepared and signed by  Lavelle Reid M.D.  Signed 2017 09:30:47  HEMODYNAMIC TABLES  Pressures:  Baseline  Pressures:  - HR: 84  Pressures:  - Rhythm:  Pressures:  -- Aortic Pressure (S/D/M): 149/85/109  Pressures:  Intervention  Pressures:  - HR: 85  Pressures:  - Rhythm:  Pressures:  -- Aortic Pressure (S/D/M): 123/86/104  Outputs:  Baseline  Outputs:  -- CALCULATIONS[age]  Outputs:  -- CALCULATIONS: Body Surface Area: 1.71  Outputs:  -- CALCULATIONS: Height in cm: 163.00  Outputs:  -- CALCULATIONS: Sex: Male  Outputs:  -- CALCULATIONS: Weight in k.80    	  ASSESSMENT/PLAN: 	    1) NSTEMI likely demand ischemia precipitated by sepsis: Remains on pressors/intubated  - Given recent BMS would continue aspirin 81 mg daily; Discussed with CTICU PA okay to hold clopidogrel 75 mg daily at this time and resume when surgery okays   - Currently on pressors, antihypertensives and BB held    - Further ischemic eval deferred pending resolution of acute issues including septic shock and respiratory failure.   - Will continue to follow Date of Admission: 17     Patient is a 82y old  Male who presents with a chief complaint of feeding tube clogged s/p maryjane trent (21 Sep 2017 04:19)      HISTORY OF PRESENT ILLNESS:     81 yo gentleman w/PMHx robotic-assisted Maryjane-Trent esophagogastrectomy with feeding J tube placement for esophageal adenocarcinoma (T1aN0) with Dr. Dyer and Dr. Christian Weinstein on 17, CAD, bare metal stents to LAD placed in 2017 and balloon angioplasty to D2, hyperlipidemia, BPH, HTN, asthma, CVA, anxiety s/p esophagogastrectomy last month, recently had left pleural effusion drained via pigtail catheter, and found to have NSTEMI in the setting of sepsis thought to be likely from his surgical leak transferred to [facility] on  for continued surgical care per Dr. Weinstein with an EGD yesterday confirmed 2 contained perforation sites cardiology now consulted given recent NSTEMI.     Patient continues to be intubated, requiring pressors and sedation.     Allergies    penicillin (Rash)    Intolerances    	    MEDICATIONS:  phenylephrine    Infusion 0.3 MICROgram(s)/kG/Min IV Continuous <Continuous>    cefepime  IVPB 1000 milliGRAM(s) IV Intermittent every 12 hours  vancomycin  IVPB 750 milliGRAM(s) IV Intermittent every 12 hours  metroNIDAZOLE  IVPB      metroNIDAZOLE  IVPB 500 milliGRAM(s) IV Intermittent every 8 hours  micafungin IVPB 100 milliGRAM(s) IV Intermittent every 24 hours  micafungin IVPB        ALBUTerol    90 MICROgram(s) HFA Inhaler 2 Puff(s) Inhalation every 6 hours  ipratropium 17 MICROgram(s) HFA Inhaler 1 Puff(s) Inhalation every 6 hours  fluticasone propionate   220 MICROgram(s) HFA Inhaler 1 Puff(s) Inhalation two times a day    fosphenytoin IVPB 200 milliGRAM(s) PE IV Intermittent every 12 hours  propofol Infusion 5 MICROgram(s)/kG/Min IV Continuous <Continuous>  fentaNYL   Infusion 1 MICROgram(s)/kG/Hr IV Continuous <Continuous>  dexmedetomidine Infusion 0.2 MICROgram(s)/kG/Hr IV Continuous <Continuous>    pantoprazole  Injectable 40 milliGRAM(s) IV Push two times a day    methylPREDNISolone sodium succinate Injectable 20 milliGRAM(s) IV Push two times a day    chlorhexidine 0.12% Liquid 15 milliLiter(s) Swish and Spit five times a day  chlorhexidine 4% Liquid 1 Application(s) Topical daily  folic acid Injectable 1 milliGRAM(s) IV Push daily  pyridoxine Injectable 100 milliGRAM(s) IV Push daily  cyanocobalamin Injectable 500 MICROGram(s) SubCutaneous daily  sodium chloride 0.9%. 1000 milliLiter(s) IV Continuous <Continuous>      PAST MEDICAL & SURGICAL HISTORY:  Coronary artery disease: s/p 3 stents on 2017 at [place] Dr. Lavelle Reid  OA (osteoarthritis) of knee: right  Former smoker, stopped smoking in distant past  Rheumatoid arthritis  Seizure disorder: 1 episode approximately 15 yrs ago  Asthma  Hyperlipidemia  BPH (benign prostatic hyperplasia)  HTN (hypertension)  Esophagus cancer  Chronic allergic rhinitis  BCC (basal cell carcinoma): skin  Cerebrovascular accident (CVA)  Anxiety  Anastomotic leak following esophagectomy: [place] trent esophagectomy  History of tonsillectomy  H/O heart artery stent: 2017  Knee arthropathy: left  History of total hip replacement: left  History of hernia repair      FAMILY HISTORY:  Family history of heart attack (Sibling)  Family history of pulmonary embolism: father @ 49 yr old  FH: CAD (coronary artery disease)      SOCIAL HISTORY:    Former smoker, stopped smoking in distant past      REVIEW OF SYSTEMS:  See HPI. Otherwise, 10 point ROS done and otherwise negative.    PHYSICAL EXAM:  T(C): 37.7 (17 @ 12:00), Max: 37.7 (17 @ 12:00)  HR: 84 (17 @ 16:01) (64 - 89)  BP: 94/56 (17 @ 15:00) (82/43 - 121/65)  RR: 27 (17 @ 15:00) (16 - 32)  SpO2: 95% (17 @ 16:01) (95% - 100%)  Wt(kg): --  I&O's Summary    21 Sep 2017 07:  -  22 Sep 2017 07:00  --------------------------------------------------------  IN: 4791.3 mL / OUT: 2075 mL / NET: 2716.3 mL    22 Sep 2017 07:01  -  22 Sep 2017 16:15  --------------------------------------------------------  IN: 1454.8 mL / OUT: 655 mL / NET: 799.8 mL        Appearance: Intubated, sedated  HEENT:   ET tube in place, NG tube in place	  Cardiovascular: Normal S1 S2, No JVD, No murmurs, No edema  Respiratory: Lungs clear to auscultation anteriorly  Psychiatry: Sedated  Gastrointestinal:  Soft, Non-tender  Skin: No rashes, No ecchymoses, No cyanosis	  Neurologic:Sedated  Extremities:  No clubbing, cyanosis or edema  Vascular: Peripheral pulses present      LABS:	 	    CBC Full  -  ( 22 Sep 2017 03:55 )  WBC Count : 11.22 K/uL  Hemoglobin : 10.5 g/dL  Hematocrit : 32.3 %  Platelet Count - Automated : 247 K/uL  Mean Cell Volume : 90.7 fL  Mean Cell Hemoglobin : 29.5 pg  Mean Cell Hemoglobin Concentration : 32.5 %        139  |  109<H>  |  16  ----------------------------<  195<H>  4.0   |  19<L>  |  0.53      143  |  111<H>  |  23  ----------------------------<  118<H>  3.8   |  21<L>  |  0.51    Ca    8.0<L>      22 Sep 2017 03:55  Ca    8.1<L>      21 Sep 2017 06:55  Phos  2.6       Phos  2.8       Mg     2.0       Mg     2.1         TPro  5.7<L>  /  Alb  1.9<L>  /  TBili  0.5  /  DBili  x   /  AST  153<H>  /  ALT  134<H>  /  AlkPhos  177<H>        TELEMETRY: 	    ECG:  	  	    PREVIOUS DIAGNOSTIC TESTING:    [ ] Echocardiogram: < from: Transthoracic Echocardiogram (17 @ 15:29) >  CONCLUSIONS:  1. Mitral annularcalcification, otherwise normal mitral  valve. Minimal mitral regurgitation.  2. Aortic valve leaflet morphology not well visualized.  Peak transaortic valve gradient equals 27 mm Hg, mean  transaortic valve gradient equals 12 mm Hg, consistent with  probable mild aortic stenosis. Mild aortic regurgitation.  3. Endocardium not well visualized; grossly normal left  ventricular systolic function.  4. The right ventricle is not well visualized; grossly  normal right ventricular systolic function.  5. Estimated right ventricular systolic pressure equals 44  mm Hg, assuming right atrial pressure equals 10 mm Hg,  consistent with mild pulmonary hypertension.    [ ]  Catheterization: < from: Cardiac Cath Lab - Adult (17 @ 19:06) >  CORONARY VESSELS: The coronary circulation is left dominant.  LM:   --  LM: Normal.  LAD:   --  LAD: Normal. There was no significant restenosis.  --  D2: There was a 95 % stenosis.  CX:   --  Circumflex: Normal.  RCA:   --  Mid RCA: There was a 40 % stenosis.  COMPLICATIONS: There were no complications.  INTERVENTIONAL RECOMMENDATIONS: ASA  Prepared and signed by  Lavelle Reid M.D.  Signed 2017 09:30:47  HEMODYNAMIC TABLES  Pressures:  Baseline  Pressures:  - HR: 84  Pressures:  - Rhythm:  Pressures:  -- Aortic Pressure (S/D/M): 149/85/109  Pressures:  Intervention  Pressures:  - HR: 85  Pressures:  - Rhythm:  Pressures:  -- Aortic Pressure (S/D/M): 123/86/104  Outputs:  Baseline  Outputs:  -- CALCULATIONS[age]  Outputs:  -- CALCULATIONS: Body Surface Area: 1.71  Outputs:  -- CALCULATIONS: Height in cm: 163.00  Outputs:  -- CALCULATIONS: Sex: Male  Outputs:  -- CALCULATIONS: Weight in k.80    	  ASSESSMENT/PLAN: 	    82M pmhx CAD, bare metal stents to LAD placed in 2017 and balloon angioplasty to D2, hyperlipidemia, BPH, HTN, asthma, CVA, anxiety s/p esophagogastrectomy last month, recently had left pleural effusion drained via pigtail catheter now w/chest tube and EGD demonstrating contained gastric/esophageal perforation still intubated/sedated on pressors for septic shock with NSTEMI in this setting being medically managed.     1) NSTEMI likely demand ischemia precipitated by sepsis: Remains on pressors/intubated  - Given recent BMS would continue aspirin 81 mg daily; Discussed with [facility] PA okay to hold clopidogrel 75 mg daily at this time and resume when surgery okays   - Currently on pressors, antihypertensives and BB held    - Further ischemic eval deferred pending resolution of acute issues including septic shock and respiratory failure.   - Will continue to follow    [id number]

## 2017-09-23 LAB
BASE EXCESS BLDA CALC-SCNC: -1.5 MMOL/L — SIGNIFICANT CHANGE UP
BASE EXCESS BLDA CALC-SCNC: -2.9 MMOL/L — SIGNIFICANT CHANGE UP
BUN SERPL-MCNC: 17 MG/DL — SIGNIFICANT CHANGE UP (ref 7–23)
CALCIUM SERPL-MCNC: 8.3 MG/DL — LOW (ref 8.4–10.5)
CHLORIDE SERPL-SCNC: 111 MMOL/L — HIGH (ref 98–107)
CO2 SERPL-SCNC: 22 MMOL/L — SIGNIFICANT CHANGE UP (ref 22–31)
CREAT SERPL-MCNC: 0.55 MG/DL — SIGNIFICANT CHANGE UP (ref 0.5–1.3)
CULTURE RESULTS: SIGNIFICANT CHANGE UP
GLUCOSE BLDA-MCNC: 187 MG/DL — HIGH (ref 70–99)
GLUCOSE SERPL-MCNC: 132 MG/DL — HIGH (ref 70–99)
HCO3 BLDA-SCNC: 22 MMOL/L — SIGNIFICANT CHANGE UP (ref 22–26)
HCO3 BLDA-SCNC: 23 MMOL/L — SIGNIFICANT CHANGE UP (ref 22–26)
HCT VFR BLD CALC: 35.5 % — LOW (ref 39–50)
HCT VFR BLDA CALC: 36.2 % — LOW (ref 39–51)
HGB BLD-MCNC: 11.2 G/DL — LOW (ref 13–17)
HGB BLDA-MCNC: 11.8 G/DL — LOW (ref 13–17)
LACTATE BLDA-SCNC: 1.5 MMOL/L — SIGNIFICANT CHANGE UP (ref 0.5–2)
MAGNESIUM SERPL-MCNC: 1.9 MG/DL — SIGNIFICANT CHANGE UP (ref 1.6–2.6)
MCHC RBC-ENTMCNC: 29.5 PG — SIGNIFICANT CHANGE UP (ref 27–34)
MCHC RBC-ENTMCNC: 31.5 % — LOW (ref 32–36)
MCV RBC AUTO: 93.4 FL — SIGNIFICANT CHANGE UP (ref 80–100)
NRBC # FLD: 0 — SIGNIFICANT CHANGE UP
ORGANISM # SPEC MICROSCOPIC CNT: SIGNIFICANT CHANGE UP
ORGANISM # SPEC MICROSCOPIC CNT: SIGNIFICANT CHANGE UP
PCO2 BLDA: 37 MMHG — SIGNIFICANT CHANGE UP (ref 35–48)
PCO2 BLDA: 39 MMHG — SIGNIFICANT CHANGE UP (ref 35–48)
PH BLDA: 7.38 PH — SIGNIFICANT CHANGE UP (ref 7.35–7.45)
PH BLDA: 7.39 PH — SIGNIFICANT CHANGE UP (ref 7.35–7.45)
PHOSPHATE SERPL-MCNC: 2.7 MG/DL — SIGNIFICANT CHANGE UP (ref 2.5–4.5)
PLATELET # BLD AUTO: 237 K/UL — SIGNIFICANT CHANGE UP (ref 150–400)
PMV BLD: 9.2 FL — SIGNIFICANT CHANGE UP (ref 7–13)
PO2 BLDA: 147 MMHG — HIGH (ref 83–108)
PO2 BLDA: 98 MMHG — SIGNIFICANT CHANGE UP (ref 83–108)
POTASSIUM BLDA-SCNC: 4.1 MMOL/L — SIGNIFICANT CHANGE UP (ref 3.4–4.5)
POTASSIUM SERPL-MCNC: 4 MMOL/L — SIGNIFICANT CHANGE UP (ref 3.5–5.3)
POTASSIUM SERPL-SCNC: 4 MMOL/L — SIGNIFICANT CHANGE UP (ref 3.5–5.3)
RBC # BLD: 3.8 M/UL — LOW (ref 4.2–5.8)
RBC # FLD: 16.2 % — HIGH (ref 10.3–14.5)
SAO2 % BLDA: 98.2 % — SIGNIFICANT CHANGE UP (ref 95–99)
SAO2 % BLDA: 99.6 % — HIGH (ref 95–99)
SODIUM BLDA-SCNC: 138 MMOL/L — SIGNIFICANT CHANGE UP (ref 136–146)
SODIUM SERPL-SCNC: 140 MMOL/L — SIGNIFICANT CHANGE UP (ref 135–145)
SPECIMEN SOURCE: SIGNIFICANT CHANGE UP
WBC # BLD: 11.06 K/UL — HIGH (ref 3.8–10.5)
WBC # FLD AUTO: 11.06 K/UL — HIGH (ref 3.8–10.5)

## 2017-09-23 PROCEDURE — 99291 CRITICAL CARE FIRST HOUR: CPT

## 2017-09-23 PROCEDURE — 71010: CPT | Mod: 26

## 2017-09-23 PROCEDURE — 99233 SBSQ HOSP IP/OBS HIGH 50: CPT | Mod: GC

## 2017-09-23 PROCEDURE — 99292 CRITICAL CARE ADDL 30 MIN: CPT

## 2017-09-23 RX ORDER — FUROSEMIDE 40 MG
10 TABLET ORAL ONCE
Qty: 0 | Refills: 0 | Status: COMPLETED | OUTPATIENT
Start: 2017-09-23 | End: 2017-09-23

## 2017-09-23 RX ORDER — ACETAMINOPHEN 500 MG
1000 TABLET ORAL ONCE
Qty: 0 | Refills: 0 | Status: COMPLETED | OUTPATIENT
Start: 2017-09-23 | End: 2017-09-24

## 2017-09-23 RX ORDER — SODIUM CHLORIDE 9 MG/ML
4 INJECTION INTRAMUSCULAR; INTRAVENOUS; SUBCUTANEOUS EVERY 6 HOURS
Qty: 0 | Refills: 0 | Status: COMPLETED | OUTPATIENT
Start: 2017-09-23 | End: 2017-09-26

## 2017-09-23 RX ORDER — IPRATROPIUM/ALBUTEROL SULFATE 18-103MCG
3 AEROSOL WITH ADAPTER (GRAM) INHALATION EVERY 6 HOURS
Qty: 0 | Refills: 0 | Status: DISCONTINUED | OUTPATIENT
Start: 2017-09-23 | End: 2017-10-03

## 2017-09-23 RX ORDER — DORNASE ALFA 1 MG/ML
2.5 SOLUTION RESPIRATORY (INHALATION)
Qty: 0 | Refills: 0 | Status: DISCONTINUED | OUTPATIENT
Start: 2017-09-23 | End: 2017-10-03

## 2017-09-23 RX ORDER — INSULIN LISPRO 100/ML
2 VIAL (ML) SUBCUTANEOUS ONCE
Qty: 0 | Refills: 0 | Status: COMPLETED | OUTPATIENT
Start: 2017-09-23 | End: 2017-09-23

## 2017-09-23 RX ADMIN — PHENYLEPHRINE HYDROCHLORIDE 21.66 MICROGRAM(S)/KG/MIN: 10 INJECTION INTRAVENOUS at 07:30

## 2017-09-23 RX ADMIN — FENTANYL CITRATE 1 MICROGRAM(S)/KG/HR: 50 INJECTION INTRAVENOUS at 08:20

## 2017-09-23 RX ADMIN — DEXMEDETOMIDINE HYDROCHLORIDE IN 0.9% SODIUM CHLORIDE 3.61 MICROGRAM(S)/KG/HR: 4 INJECTION INTRAVENOUS at 08:19

## 2017-09-23 RX ADMIN — Medication 10 MILLIGRAM(S): at 23:11

## 2017-09-23 RX ADMIN — SODIUM CHLORIDE 10 MILLILITER(S): 9 INJECTION INTRAMUSCULAR; INTRAVENOUS; SUBCUTANEOUS at 07:30

## 2017-09-23 RX ADMIN — Medication 1 PUFF(S): at 09:57

## 2017-09-23 RX ADMIN — Medication 150 MILLIGRAM(S): at 21:48

## 2017-09-23 RX ADMIN — PREGABALIN 500 MICROGRAM(S): 225 CAPSULE ORAL at 13:00

## 2017-09-23 RX ADMIN — SODIUM CHLORIDE 4 MILLILITER(S): 9 INJECTION INTRAMUSCULAR; INTRAVENOUS; SUBCUTANEOUS at 21:55

## 2017-09-23 RX ADMIN — Medication 100 MILLIGRAM(S): at 13:00

## 2017-09-23 RX ADMIN — FENTANYL CITRATE 7.22 MICROGRAM(S)/KG/HR: 50 INJECTION INTRAVENOUS at 07:30

## 2017-09-23 RX ADMIN — PANTOPRAZOLE SODIUM 40 MILLIGRAM(S): 20 TABLET, DELAYED RELEASE ORAL at 17:38

## 2017-09-23 RX ADMIN — CEFEPIME 100 MILLIGRAM(S): 1 INJECTION, POWDER, FOR SOLUTION INTRAMUSCULAR; INTRAVENOUS at 17:38

## 2017-09-23 RX ADMIN — CHLORHEXIDINE GLUCONATE 15 MILLILITER(S): 213 SOLUTION TOPICAL at 08:18

## 2017-09-23 RX ADMIN — CHLORHEXIDINE GLUCONATE 15 MILLILITER(S): 213 SOLUTION TOPICAL at 17:37

## 2017-09-23 RX ADMIN — Medication 100 MILLIGRAM(S): at 05:10

## 2017-09-23 RX ADMIN — PANTOPRAZOLE SODIUM 40 MILLIGRAM(S): 20 TABLET, DELAYED RELEASE ORAL at 05:10

## 2017-09-23 RX ADMIN — CEFEPIME 100 MILLIGRAM(S): 1 INJECTION, POWDER, FOR SOLUTION INTRAMUSCULAR; INTRAVENOUS at 05:10

## 2017-09-23 RX ADMIN — ALBUTEROL 2 PUFF(S): 90 AEROSOL, METERED ORAL at 09:55

## 2017-09-23 RX ADMIN — CHLORHEXIDINE GLUCONATE 15 MILLILITER(S): 213 SOLUTION TOPICAL at 13:00

## 2017-09-23 RX ADMIN — CHLORHEXIDINE GLUCONATE 1 APPLICATION(S): 213 SOLUTION TOPICAL at 06:43

## 2017-09-23 RX ADMIN — Medication 1 MILLIGRAM(S): at 13:00

## 2017-09-23 RX ADMIN — Medication 3 MILLILITER(S): at 16:09

## 2017-09-23 RX ADMIN — Medication 300 MILLIGRAM(S): at 13:00

## 2017-09-23 RX ADMIN — CHLORHEXIDINE GLUCONATE 15 MILLILITER(S): 213 SOLUTION TOPICAL at 00:47

## 2017-09-23 RX ADMIN — FOSPHENYTOIN 108 MILLIGRAM(S) PE: 50 INJECTION INTRAMUSCULAR; INTRAVENOUS at 18:23

## 2017-09-23 RX ADMIN — Medication 150 MILLIGRAM(S): at 08:18

## 2017-09-23 RX ADMIN — Medication 1 PUFF(S): at 09:59

## 2017-09-23 RX ADMIN — ALBUTEROL 2 PUFF(S): 90 AEROSOL, METERED ORAL at 03:10

## 2017-09-23 RX ADMIN — Medication 1 PUFF(S): at 03:11

## 2017-09-23 RX ADMIN — PROPOFOL 2.17 MICROGRAM(S)/KG/MIN: 10 INJECTION, EMULSION INTRAVENOUS at 07:30

## 2017-09-23 RX ADMIN — Medication 20 MILLIGRAM(S): at 17:38

## 2017-09-23 RX ADMIN — Medication 20 MILLIGRAM(S): at 05:10

## 2017-09-23 RX ADMIN — Medication 3 MILLILITER(S): at 21:36

## 2017-09-23 RX ADMIN — DORNASE ALFA 2.5 MILLIGRAM(S): 1 SOLUTION RESPIRATORY (INHALATION) at 21:50

## 2017-09-23 RX ADMIN — MICAFUNGIN SODIUM 105 MILLIGRAM(S): 100 INJECTION, POWDER, LYOPHILIZED, FOR SOLUTION INTRAVENOUS at 13:00

## 2017-09-23 RX ADMIN — Medication 2 UNIT(S): at 01:09

## 2017-09-23 RX ADMIN — Medication 100 MILLIGRAM(S): at 14:02

## 2017-09-23 RX ADMIN — Medication 10 MILLIGRAM(S): at 10:20

## 2017-09-23 RX ADMIN — FOSPHENYTOIN 108 MILLIGRAM(S) PE: 50 INJECTION INTRAMUSCULAR; INTRAVENOUS at 05:10

## 2017-09-23 RX ADMIN — Medication 200 MILLIGRAM(S): at 23:11

## 2017-09-23 RX ADMIN — Medication 100 MILLIGRAM(S): at 23:00

## 2017-09-23 NOTE — PROGRESS NOTE ADULT - SUBJECTIVE AND OBJECTIVE BOX
MEJIA LATIF            MRN-[id number]         penicillin (Rash)             HPI:  82 M presents with non-functioning J tube and SOB. Patient recently underwent a robotic-assisted [place] esophagogastrectomy with feeding J tube placement for esophageal adenocarcinoma (T1aN0) with Dr. Dyer and Dr. Christian Weinstein on 8/14/17. After surgery, patient was found to have an anastomotic leak and was treated conservatively with NPO, antibiotics, chest tube drainage, and J tube feeding. Patient was eventually discharged to rehab. On day of evaluation, patient's J tube was unable to be used, and was sent to the ED for evaluation. Patient also complains of SOB, but no chest pain or abdominal pain. Denies fever or chills. Patient was taking medications by mouth, but was only getting nutrition via his J tube. Esophagram done on 9/19/17 showing  a leak at the distal site just above the hemidiaphragm into the pleural space.   Patient was found to have a Left pleural effusion. Patent received pigtail and developed respiratory distress. Patient was transferred to the SICU where he was intubated. Patient also with right GINA drain.  Patient is now being transferred to [facility] for optimal management. (20 Sep 2017 23:45)      Procedure: [place] esophagectomy  POD# 40    Issues:  Respiratory failure  Hypotension  Pleural effusion  Esophageal leak  Seizure disorder  NSTEMI    Home Medications:   * No Current Medications as of 20-Sep-2017 21:03 documented in Structured Notes  · 	aspirin-dipyridamole 25 mg-200 mg oral capsule, extended release: 1 cap(s) orally 2 times a day  · 	docusate sodium 100 mg oral capsule: 1 cap(s) orally 2 times a day  · 	ProAir HFA 90 mcg/inh inhalation aerosol: 2 puff(s) inhaled 4 times a day, As Needed   · 	Vitamin B-12 500 mcg oral tablet: 1 tab(s) orally once a day  · 	amLODIPine 5 mg oral tablet: 1 tab(s) orally once a day in pm  · 	simvastatin 40 mg oral tablet: 1 tab(s) orally once a day in pm  · 	tamsulosin 0.4 mg oral capsule: 1 cap(s) orally once a day in pm  · 	Zetia 10 mg oral tablet: 1 tab(s) orally once a day in pm  · 	ALPRAZolam 0.25 mg oral tablet: 2 tab(s) orally 2 times a day  · 	olmesartan 20 mg oral tablet: 1 tab(s) orally once a day in pm  · 	montelukast 10 mg oral tablet: 1 tab(s) orally once a day in pm  · 	Dilantin 100 mg oral capsule: 2 cap(s) orally 2 times a day  · 	predniSONE 5 mg oral tablet: 1 tab(s) orally once a day in am  · 	finasteride 5 mg oral tablet: 1 tab(s) orally once a day in pm  · 	folic acid 0.4 mg oral tablet: 1 tab(s) orally once a day  · 	Vitamin B6 100 mg oral tablet: 1 tab(s) orally once a day      PAST MEDICAL & SURGICAL HISTORY:  Coronary artery disease: s/p 3 stents on June 30, 2017 at [place] Dr. Lavelle Reid  OA (osteoarthritis) of knee: right  Former smoker, stopped smoking in distant past  Rheumatoid arthritis  Seizure disorder: 1 episode approximately 15 yrs ago  Asthma  Hyperlipidemia  BPH (benign prostatic hyperplasia)  HTN (hypertension)  Esophagus cancer  Chronic allergic rhinitis  BCC (basal cell carcinoma): skin  Cerebrovascular accident (CVA)  Anxiety  Anastomotic leak following esophagectomy: Vance trent esophagectomy  History of tonsillectomy  H/O heart artery stent: June 30, 2017  Knee arthropathy: left  History of total hip replacement: left  History of hernia repair        ICU Vital Signs Last 24 Hrs  T(C): 36.6 (23 Sep 2017 08:00), Max: 37.6 (22 Sep 2017 16:00)  T(F): 97.8 (23 Sep 2017 08:00), Max: 99.7 (22 Sep 2017 16:00)  HR: 78 (23 Sep 2017 13:00) (69 - 85)  BP: 113/65 (23 Sep 2017 12:00) (94/56 - 144/80)  BP(mean): 76 (23 Sep 2017 12:00) (62 - 95)  ABP: 106/61 (23 Sep 2017 13:00) (97/52 - 154/94)  ABP(mean): 76 (23 Sep 2017 13:00) (65 - 119)  RR: 24 (23 Sep 2017 13:00) (15 - 27)  SpO2: 98% (23 Sep 2017 13:00) (95% - 100%)    I&O's Summary    22 Sep 2017 07:01  -  23 Sep 2017 07:00  --------------------------------------------------------  IN: 3755.8 mL / OUT: 2272.5 mL / NET: 1483.3 mL    23 Sep 2017 07:01  -  23 Sep 2017 14:07  --------------------------------------------------------  IN: 679.5 mL / OUT: 1095 mL / NET: -415.5 mL      CAPILLARY BLOOD GLUCOSE  158 (23 Sep 2017 06:00)      MEDICATIONS  (STANDING):  cefepime  IVPB 1000 milliGRAM(s) IV Intermittent every 12 hours  vancomycin  IVPB 750 milliGRAM(s) IV Intermittent every 12 hours  metroNIDAZOLE  IVPB      metroNIDAZOLE  IVPB 500 milliGRAM(s) IV Intermittent every 8 hours  chlorhexidine 0.12% Liquid 15 milliLiter(s) Swish and Spit five times a day  chlorhexidine 4% Liquid 1 Application(s) Topical daily  fosphenytoin IVPB 200 milliGRAM(s) PE IV Intermittent every 12 hours  folic acid Injectable 1 milliGRAM(s) IV Push daily  pyridoxine Injectable 100 milliGRAM(s) IV Push daily  cyanocobalamin Injectable 500 MICROGram(s) SubCutaneous daily  propofol Infusion 5 MICROgram(s)/kG/Min (2.166 mL/Hr) IV Continuous <Continuous>  fentaNYL   Infusion 1 MICROgram(s)/kG/Hr (7.22 mL/Hr) IV Continuous <Continuous>  pantoprazole  Injectable 40 milliGRAM(s) IV Push two times a day  methylPREDNISolone sodium succinate Injectable 20 milliGRAM(s) IV Push two times a day  ALBUTerol    90 MICROgram(s) HFA Inhaler 2 Puff(s) Inhalation every 6 hours  ipratropium 17 MICROgram(s) HFA Inhaler 1 Puff(s) Inhalation every 6 hours  micafungin IVPB 100 milliGRAM(s) IV Intermittent every 24 hours  micafungin IVPB      fluticasone propionate   220 MICROgram(s) HFA Inhaler 1 Puff(s) Inhalation two times a day  dexmedetomidine Infusion 0.2 MICROgram(s)/kG/Hr (3.61 mL/Hr) IV Continuous <Continuous>  sodium chloride 0.9%. 1000 milliLiter(s) (10 mL/Hr) IV Continuous <Continuous>  aspirin Suppository 300 milliGRAM(s) Rectal daily  phenylephrine    Infusion 0.8 MICROgram(s)/kG/Min (21.66 mL/Hr) IV Continuous <Continuous>  ALBUTerol/ipratropium for Nebulization 3 milliLiter(s) Nebulizer every 6 hours  dornase ziyad Solution 2.5 milliGRAM(s) Inhalation two times a day    MEDICATIONS  (PRN):      Physical exam:                             General:               Pt is awake, following commands, on CPAP                                            Neuro:                  Sedated                           Cardiovascular:      S1 & S2, regular                           Respiratory:         Air entry is fair and equal on both sides, has bilateral conducted sounds                           GI:                       Soft, nondistended and nontender                            Ext:                      No cyanosis, has edema             Labs:                                                                           11.2   11.06 )-----------( 237      ( 23 Sep 2017 03:30 )             35.5             09-23    140  |  111<H>  |  17  ----------------------------<  132<H>  4.0   |  22  |  0.55    Ca    8.3<L>      23 Sep 2017 03:30  Phos  2.7     09-23  Mg     1.9     09-23                        CXR: 9/22/17    The heart is normal in size.   Endotracheal tube is present and in good position.   Left-sided IJ catheter is present with the tip at the level of the SVC.   Right-sided pigtail catheter is present.   Left-sided pigtail catheter is present and in unchanged position from   prior   examination.   Loculated right lower pleural effusion.   Increased pulmonary vascular markings consistent with interstitial   pulmonary   edema.   Degenerative changes of the thoracic spine are present.   There is no pneumothorax.       Plan:      General: 82yMale s/p  [place] esophagectomy  POD#40, admitted to [facility] for non-functioning J-tube, pleural effusion, got intubated while in SICU, transferred to [facility] for further treatment. EGD showed contained leak.                          Neuro:                                          Pain control with Fentanyl PRN                            Cardiovascular:                                          Continue hemodynamic monitoring.    Hypotension: Titrate Ryan to MAP>65. Continue IVF                            Respiratory:                                         Full mechanical ventilator support, UW--40-5                                           Monitor chest tube output                                         GINA to low continuos suction, L-Pigtail to pleurovac                                                                Continue bronchodilators, pulmonary toilet     CT -Chest/Abd showed loculated pleural effusion - pigtail catheter was inserted and drained 350 cc of serous fluid                            GI                                         NPO     EGD - contained perforation of stomach & leak near anastomosis site                                          Continue GI prophylaxis with Protonix                                         Restart J-tube feeds later	                                                                 Renal:                                         Continue D5LR 100cc/hr                                         Monitor I/Os and electrolytes                                         López                                                  Hem/ Onc:                                         Monitor chest tube output                                          Received 2 PRBCs                           Infectious disease:                                            Continue Vanco / Cefipime / Flagyl      PCR for C-dif: Non-reactive       Follow culture- of pleural effusion                            Endocrine                                             Continue Accu-Checks with coverage        Pertinent clinical, laboratory, radiographic, hemodynamic, echocardiographic, respiratory data, microbiologic data and chart were reviewed and analyzed frequently throughout the course of the day and night  Patient seen, examined and plan discussed with CT Surgeon Dr. Weinstein / CTICU team during rounds.    Pt's status discussed with family at bedside, updated status    I have spent 90 minutes of critical care time with this pt between  7 am and 12  am        Jose Sharif MD MEJIA LATIF            MRN-[id number]         penicillin (Rash)             HPI:  82 M presents with non-functioning J tube and SOB. Patient recently underwent a robotic-assisted [place] esophagogastrectomy with feeding J tube placement for esophageal adenocarcinoma (T1aN0) with Dr. Dyer and Dr. Christian Weinstein on 8/14/17. After surgery, patient was found to have an anastomotic leak and was treated conservatively with NPO, antibiotics, chest tube drainage, and J tube feeding. Patient was eventually discharged to rehab. On day of evaluation, patient's J tube was unable to be used, and was sent to the ED for evaluation. Patient also complains of SOB, but no chest pain or abdominal pain. Denies fever or chills. Patient was taking medications by mouth, but was only getting nutrition via his J tube. Esophagram done on 9/19/17 showing  a leak at the distal site just above the hemidiaphragm into the pleural space.   Patient was found to have a Left pleural effusion. Patent received pigtail and developed respiratory distress. Patient was transferred to the SICU where he was intubated. Patient also with right GINA drain.  Patient is now being transferred to [facility] CTICU for optimal management. (20 Sep 2017 23:45)      Procedure: [place] esophagectomy  POD# 40    Issues:  Respiratory failure  Hypotension  Pleural effusion  Esophageal leak  Seizure disorder  NSTEMI    Home Medications:   * No Current Medications as of 20-Sep-2017 21:03 documented in Structured Notes  · 	aspirin-dipyridamole 25 mg-200 mg oral capsule, extended release: 1 cap(s) orally 2 times a day  · 	docusate sodium 100 mg oral capsule: 1 cap(s) orally 2 times a day  · 	ProAir HFA 90 mcg/inh inhalation aerosol: 2 puff(s) inhaled 4 times a day, As Needed   · 	Vitamin B-12 500 mcg oral tablet: 1 tab(s) orally once a day  · 	amLODIPine 5 mg oral tablet: 1 tab(s) orally once a day in pm  · 	simvastatin 40 mg oral tablet: 1 tab(s) orally once a day in pm  · 	tamsulosin 0.4 mg oral capsule: 1 cap(s) orally once a day in pm  · 	Zetia 10 mg oral tablet: 1 tab(s) orally once a day in pm  · 	ALPRAZolam 0.25 mg oral tablet: 2 tab(s) orally 2 times a day  · 	olmesartan 20 mg oral tablet: 1 tab(s) orally once a day in pm  · 	montelukast 10 mg oral tablet: 1 tab(s) orally once a day in pm  · 	Dilantin 100 mg oral capsule: 2 cap(s) orally 2 times a day  · 	predniSONE 5 mg oral tablet: 1 tab(s) orally once a day in am  · 	finasteride 5 mg oral tablet: 1 tab(s) orally once a day in pm  · 	folic acid 0.4 mg oral tablet: 1 tab(s) orally once a day  · 	Vitamin B6 100 mg oral tablet: 1 tab(s) orally once a day      PAST MEDICAL & SURGICAL HISTORY:  Coronary artery disease: s/p 3 stents on June 30, 2017 at [place] Dr. Lavelle Reid  OA (osteoarthritis) of knee: right  Former smoker, stopped smoking in distant past  Rheumatoid arthritis  Seizure disorder: 1 episode approximately 15 yrs ago  Asthma  Hyperlipidemia  BPH (benign prostatic hyperplasia)  HTN (hypertension)  Esophagus cancer  Chronic allergic rhinitis  BCC (basal cell carcinoma): skin  Cerebrovascular accident (CVA)  Anxiety  Anastomotic leak following esophagectomy: Vance trent esophagectomy  History of tonsillectomy  H/O heart artery stent: June 30, 2017  Knee arthropathy: left  History of total hip replacement: left  History of hernia repair        ICU Vital Signs Last 24 Hrs  T(C): 36.6 (23 Sep 2017 08:00), Max: 37.6 (22 Sep 2017 16:00)  T(F): 97.8 (23 Sep 2017 08:00), Max: 99.7 (22 Sep 2017 16:00)  HR: 78 (23 Sep 2017 13:00) (69 - 85)  BP: 113/65 (23 Sep 2017 12:00) (94/56 - 144/80)  BP(mean): 76 (23 Sep 2017 12:00) (62 - 95)  ABP: 106/61 (23 Sep 2017 13:00) (97/52 - 154/94)  ABP(mean): 76 (23 Sep 2017 13:00) (65 - 119)  RR: 24 (23 Sep 2017 13:00) (15 - 27)  SpO2: 98% (23 Sep 2017 13:00) (95% - 100%)    I&O's Summary    22 Sep 2017 07:01  -  23 Sep 2017 07:00  --------------------------------------------------------  IN: 3755.8 mL / OUT: 2272.5 mL / NET: 1483.3 mL    23 Sep 2017 07:01  -  23 Sep 2017 14:07  --------------------------------------------------------  IN: 679.5 mL / OUT: 1095 mL / NET: -415.5 mL      CAPILLARY BLOOD GLUCOSE  158 (23 Sep 2017 06:00)      MEDICATIONS  (STANDING):  cefepime  IVPB 1000 milliGRAM(s) IV Intermittent every 12 hours  vancomycin  IVPB 750 milliGRAM(s) IV Intermittent every 12 hours  metroNIDAZOLE  IVPB      metroNIDAZOLE  IVPB 500 milliGRAM(s) IV Intermittent every 8 hours  chlorhexidine 0.12% Liquid 15 milliLiter(s) Swish and Spit five times a day  chlorhexidine 4% Liquid 1 Application(s) Topical daily  fosphenytoin IVPB 200 milliGRAM(s) PE IV Intermittent every 12 hours  folic acid Injectable 1 milliGRAM(s) IV Push daily  pyridoxine Injectable 100 milliGRAM(s) IV Push daily  cyanocobalamin Injectable 500 MICROGram(s) SubCutaneous daily  propofol Infusion 5 MICROgram(s)/kG/Min (2.166 mL/Hr) IV Continuous <Continuous>  fentaNYL   Infusion 1 MICROgram(s)/kG/Hr (7.22 mL/Hr) IV Continuous <Continuous>  pantoprazole  Injectable 40 milliGRAM(s) IV Push two times a day  methylPREDNISolone sodium succinate Injectable 20 milliGRAM(s) IV Push two times a day  ALBUTerol    90 MICROgram(s) HFA Inhaler 2 Puff(s) Inhalation every 6 hours  ipratropium 17 MICROgram(s) HFA Inhaler 1 Puff(s) Inhalation every 6 hours  micafungin IVPB 100 milliGRAM(s) IV Intermittent every 24 hours  micafungin IVPB      fluticasone propionate   220 MICROgram(s) HFA Inhaler 1 Puff(s) Inhalation two times a day  dexmedetomidine Infusion 0.2 MICROgram(s)/kG/Hr (3.61 mL/Hr) IV Continuous <Continuous>  sodium chloride 0.9%. 1000 milliLiter(s) (10 mL/Hr) IV Continuous <Continuous>  aspirin Suppository 300 milliGRAM(s) Rectal daily  phenylephrine    Infusion 0.8 MICROgram(s)/kG/Min (21.66 mL/Hr) IV Continuous <Continuous>  ALBUTerol/ipratropium for Nebulization 3 milliLiter(s) Nebulizer every 6 hours  dornase ziyad Solution 2.5 milliGRAM(s) Inhalation two times a day    MEDICATIONS  (PRN):      Physical exam:                             General:               Pt is awake, following commands, on CPAP                                            Neuro:                  Moved all ext to commands                          Cardiovascular:      S1 & S2, regular                           Respiratory:         Air entry is fair and equal on both sides, has bilateral conducted sounds                           GI:                       Soft, nondistended and nontender                            Ext:                      No cyanosis, has edema             Labs:                                                                           11.2   11.06 )-----------( 237      ( 23 Sep 2017 03:30 )             35.5             09-23    140  |  111<H>  |  17  ----------------------------<  132<H>  4.0   |  22  |  0.55    Ca    8.3<L>      23 Sep 2017 03:30  Phos  2.7     09-23  Mg     1.9     09-23      CXR: 9/22/17    The heart is normal in size.   Endotracheal tube is present and in good position.   Left-sided IJ catheter is present with the tip at the level of the SVC.   Right-sided pigtail catheter is present.   Left-sided pigtail catheter is present and in unchanged position from   prior   examination.   Loculated right lower pleural effusion.   Increased pulmonary vascular markings consistent with interstitial   pulmonary   edema.   Degenerative changes of the thoracic spine are present.   There is no pneumothorax.       Plan:      General: 82yMale s/p  [place] esophagectomy  POD#40, admitted to [facility] for non-functioning J-tube, pleural effusion, got intubated while in SICU, transferred to [facility] for further treatment. EGD showed contained leak.                          Neuro:                                          Pain control with Fentanyl / Tylenol PRN                            Cardiovascular:                                          Continue hemodynamic monitoring.    Hypotension: Titrate Ryan to MAP>65. Wean off as tolerated                            Respiratory:                                         Full mechanical ventilator support, EP--40-5                                           Monitor chest tube output                                         GINA to low continuos suction, d/c  L-Pigtail today                                                                Continue bronchodilators, pulmonary toilet     R-Pigtail to suction                            GI                                         NPO     EGD - contained perforation of stomach & leak near anastomosis site                                          Continue GI prophylaxis with Protonix                                         Restarted J-tube feeds 	                                                                 Renal:                                         Lasix to keep I/Os negative                                         Monitor I/Os and electrolytes                                         López                                                  Hem/ Onc:                                         Monitor chest tube output                            Infectious disease:                                            Continue Vanco / Cefipime / Flagyl / Micafungin      Follow culture- of pleural effusion                            Endocrine                                             Continue Accu-Checks with coverage.         Pertinent clinical, laboratory, radiographic, hemodynamic, echocardiographic, respiratory data, microbiologic data and chart were reviewed and analyzed frequently throughout the course of the day and night  Patient seen, examined and plan discussed with CT Surgeon Dr. Weinstein / CTICU team during rounds.    Pt's status discussed with family at bedside, updated status    I have spent 90 minutes of critical care time with this pt between  7 am and 12  am        Jose Sharif MD

## 2017-09-23 NOTE — PROGRESS NOTE ADULT - SUBJECTIVE AND OBJECTIVE BOX
D-Team (Surgical Oncology) Daily Progress Note    SUBJECTIVE:  Pt seen and examined, and is resting in bed intubated and sedated and requiring pressors.  Pt was on CPAP for 3 hours yesterday, but failed trial of extubation. No acute events overnight.    OBJECTIVE:  Vital Signs Last 24 Hrs  T(C): 36.6 (23 Sep 2017 08:00), Max: 37.7 (22 Sep 2017 12:00)  T(F): 97.8 (23 Sep 2017 08:00), Max: 99.9 (22 Sep 2017 12:00)  HR: 84 (23 Sep 2017 10:09) (69 - 89)  BP: 133/72 (23 Sep 2017 10:05) (94/56 - 144/80)  BP(mean): 86 (23 Sep 2017 10:05) (62 - 95)  RR: 21 (23 Sep 2017 10:05) (15 - 32)  SpO2: 98% (23 Sep 2017 10:09) (95% - 100%)    I&O's Detail    22 Sep 2017 07:01  -  23 Sep 2017 07:00  --------------------------------------------------------  IN:    dexmedetomidine Infusion: 31.8 mL    dextrose 5% + sodium chloride 0.9%: 200 mL    Enteral Tube Flush: 120 mL    fentaNYL  Infusion: 154.8 mL    IV PiggyBack: 900 mL    Jevity: 1300 mL    phenylephrine   Infusion: 259.2 mL    phenylephrine   Infusion: 307.9 mL    propofol Infusion: 262.1 mL    sodium chloride 0.9%.: 220 mL  Total IN: 3755.8 mL    OUT:    Chest Tube: 55 mL    Drain: 7.5 mL    Indwelling Catheter - Urethral: 2060 mL    Nasoenteral Tube: 150 mL  Total OUT: 2272.5 mL    Total NET: 1483.3 mL      23 Sep 2017 07:01  -  23 Sep 2017 10:59  --------------------------------------------------------  IN:    dexmedetomidine Infusion: 30.6 mL    Enteral Tube Flush: 20 mL    fentaNYL  Infusion: 21.6 mL    IV PiggyBack: 150 mL    Jevity: 240 mL    phenylephrine   Infusion: 56.7 mL    propofol Infusion: 15.1 mL    sodium chloride 0.9%.: 40 mL  Total IN: 574 mL    OUT:    Indwelling Catheter - Urethral: 275 mL    Nasoenteral Tube: 20 mL  Total OUT: 295 mL    Total NET: 279 mL      Exam:  GEN: sedated  HEENT: atraumatic, normocephalic  CV: no JVD  RESP: Intubated. CT chest in place. GINA in place, on suction. NGT in place  GI/ABD: S/NT/ND  : deferred  EXTREMITIES: warm, pink, well-perfused                          11.2   11.06 )-----------( 237      ( 23 Sep 2017 03:30 )             35.5       09-23    140  |  111<H>  |  17  ----------------------------<  132<H>  4.0   |  22  |  0.55    Ca    8.3<L>      23 Sep 2017 03:30  Phos  2.7     09-23  Mg     1.9     09-23        ASSESSMENT:  82M esophageal ca s/p chemo/RT followed by Vance-Trent esophagogastrectomy with feeding J tube. Complicated by leak s/p IV antibiotics, NPO     PLAN:    - J-tube feeds   - Monitor vent settings  - Extubate when tolerating  - Care per CTICU    Laura Gaines MD PGY-1  pager: [phone]

## 2017-09-23 NOTE — PROGRESS NOTE ADULT - SUBJECTIVE AND OBJECTIVE BOX
MEJIA LATIF:[id number],   82yMale followed for:  penicillin (Rash)    PAST MEDICAL & SURGICAL HISTORY:  Coronary artery disease: s/p 3 stents on June 30, 2017 at [place] Dr. Lavelle Reid  OA (osteoarthritis) of knee: right  Former smoker, stopped smoking in distant past  Rheumatoid arthritis  Seizure disorder: 1 episode approximately 15 yrs ago  Asthma  Hyperlipidemia  BPH (benign prostatic hyperplasia)  HTN (hypertension)  Esophagus cancer  Chronic allergic rhinitis  BCC (basal cell carcinoma): skin  Cerebrovascular accident (CVA)  Anxiety  Anastomotic leak following esophagectomy: [facility] [facility] esophagectomy  History of tonsillectomy  H/O heart artery stent: June 30, 2017  Knee arthropathy: left  History of total hip replacement: left  History of hernia repair    FAMILY HISTORY:  Family history of heart attack (Sibling)  Family history of pulmonary embolism: father @ 49 yr old  FH: CAD (coronary artery disease)    MEDICATIONS  (STANDING):  cefepime  IVPB 1000 milliGRAM(s) IV Intermittent every 12 hours  vancomycin  IVPB 750 milliGRAM(s) IV Intermittent every 12 hours  metroNIDAZOLE  IVPB      metroNIDAZOLE  IVPB 500 milliGRAM(s) IV Intermittent every 8 hours  chlorhexidine 0.12% Liquid 15 milliLiter(s) Swish and Spit five times a day  chlorhexidine 4% Liquid 1 Application(s) Topical daily  fosphenytoin IVPB 200 milliGRAM(s) PE IV Intermittent every 12 hours  folic acid Injectable 1 milliGRAM(s) IV Push daily  pyridoxine Injectable 100 milliGRAM(s) IV Push daily  cyanocobalamin Injectable 500 MICROGram(s) SubCutaneous daily  propofol Infusion 5 MICROgram(s)/kG/Min (2.166 mL/Hr) IV Continuous <Continuous>  fentaNYL   Infusion 1 MICROgram(s)/kG/Hr (7.22 mL/Hr) IV Continuous <Continuous>  pantoprazole  Injectable 40 milliGRAM(s) IV Push two times a day  methylPREDNISolone sodium succinate Injectable 20 milliGRAM(s) IV Push two times a day  ALBUTerol    90 MICROgram(s) HFA Inhaler 2 Puff(s) Inhalation every 6 hours  ipratropium 17 MICROgram(s) HFA Inhaler 1 Puff(s) Inhalation every 6 hours  micafungin IVPB 100 milliGRAM(s) IV Intermittent every 24 hours  micafungin IVPB      fluticasone propionate   220 MICROgram(s) HFA Inhaler 1 Puff(s) Inhalation two times a day  dexmedetomidine Infusion 0.2 MICROgram(s)/kG/Hr (3.61 mL/Hr) IV Continuous <Continuous>  sodium chloride 0.9%. 1000 milliLiter(s) (10 mL/Hr) IV Continuous <Continuous>  aspirin Suppository 300 milliGRAM(s) Rectal daily  phenylephrine    Infusion 0.8 MICROgram(s)/kG/Min (21.66 mL/Hr) IV Continuous <Continuous>    MEDICATIONS  (PRN):      Vital Signs Last 24 Hrs  T(C): 36.6 (23 Sep 2017 08:00), Max: 37.7 (22 Sep 2017 12:00)  T(F): 97.8 (23 Sep 2017 08:00), Max: 99.9 (22 Sep 2017 12:00)  HR: 78 (23 Sep 2017 08:00) (69 - 89)  BP: 113/56 (23 Sep 2017 08:00) (94/56 - 118/61)  BP(mean): 70 (23 Sep 2017 08:00) (62 - 74)  RR: 21 (23 Sep 2017 08:00) (15 - 32)  SpO2: 96% (23 Sep 2017 08:00) (95% - 100%)  nc/at  s1s2  cta  soft, nt, nd no guarding or rebound  no c/c/e    CBC Full  -  ( 23 Sep 2017 03:30 )  WBC Count : 11.06 K/uL  Hemoglobin : 11.2 g/dL  Hematocrit : 35.5 %  Platelet Count - Automated : 237 K/uL  Mean Cell Volume : 93.4 fL  Mean Cell Hemoglobin : 29.5 pg  Mean Cell Hemoglobin Concentration : 31.5 %  Auto Neutrophil # : x  Auto Lymphocyte # : x  Auto Monocyte # : x  Auto Eosinophil # : x  Auto Basophil # : x  Auto Neutrophil % : x  Auto Lymphocyte % : x  Auto Monocyte % : x  Auto Eosinophil % : x  Auto Basophil % : x    09-23    140  |  111<H>  |  17  ----------------------------<  132<H>  4.0   |  22  |  0.55    Ca    8.3<L>      23 Sep 2017 03:30  Phos  2.7     09-23  Mg     1.9     09-23

## 2017-09-24 LAB
-  CEFAZOLIN: SIGNIFICANT CHANGE UP
-  CIPROFLOXACIN: SIGNIFICANT CHANGE UP
-  CLINDAMYCIN: SIGNIFICANT CHANGE UP
-  COAGULASE NEGATIVE STAPHYLOCOCCUS: SIGNIFICANT CHANGE UP
-  ERYTHROMYCIN: SIGNIFICANT CHANGE UP
-  GENTAMICIN: SIGNIFICANT CHANGE UP
-  MOXIFLOXACIN(AEROBIC): SIGNIFICANT CHANGE UP
-  OXACILLIN: SIGNIFICANT CHANGE UP
-  PENICILLIN: SIGNIFICANT CHANGE UP
-  RIFAMPIN.: SIGNIFICANT CHANGE UP
-  TETRACYCLINE: SIGNIFICANT CHANGE UP
-  TRIMETHOPRIM/SULFAMETHOXAZOLE: SIGNIFICANT CHANGE UP
-  VANCOMYCIN: SIGNIFICANT CHANGE UP
BACTERIA BLD CULT: SIGNIFICANT CHANGE UP
BACTERIA BLD CULT: SIGNIFICANT CHANGE UP
BUN SERPL-MCNC: 18 MG/DL — SIGNIFICANT CHANGE UP (ref 7–23)
CALCIUM SERPL-MCNC: 8.4 MG/DL — SIGNIFICANT CHANGE UP (ref 8.4–10.5)
CHLORIDE SERPL-SCNC: 108 MMOL/L — HIGH (ref 98–107)
CO2 SERPL-SCNC: 26 MMOL/L — SIGNIFICANT CHANGE UP (ref 22–31)
CREAT SERPL-MCNC: 0.53 MG/DL — SIGNIFICANT CHANGE UP (ref 0.5–1.3)
GLUCOSE SERPL-MCNC: 110 MG/DL — HIGH (ref 70–99)
HCT VFR BLD CALC: 32.4 % — LOW (ref 39–50)
HGB BLD-MCNC: 10.2 G/DL — LOW (ref 13–17)
MAGNESIUM SERPL-MCNC: 1.7 MG/DL — SIGNIFICANT CHANGE UP (ref 1.6–2.6)
MCHC RBC-ENTMCNC: 29.6 PG — SIGNIFICANT CHANGE UP (ref 27–34)
MCHC RBC-ENTMCNC: 31.5 % — LOW (ref 32–36)
MCV RBC AUTO: 93.9 FL — SIGNIFICANT CHANGE UP (ref 80–100)
METHOD TYPE: SIGNIFICANT CHANGE UP
NRBC # FLD: 0 — SIGNIFICANT CHANGE UP
ORGANISM # SPEC MICROSCOPIC CNT: SIGNIFICANT CHANGE UP
PHOSPHATE SERPL-MCNC: 2.1 MG/DL — LOW (ref 2.5–4.5)
PLATELET # BLD AUTO: 214 K/UL — SIGNIFICANT CHANGE UP (ref 150–400)
PMV BLD: 9.2 FL — SIGNIFICANT CHANGE UP (ref 7–13)
POTASSIUM SERPL-MCNC: 3.9 MMOL/L — SIGNIFICANT CHANGE UP (ref 3.5–5.3)
POTASSIUM SERPL-SCNC: 3.9 MMOL/L — SIGNIFICANT CHANGE UP (ref 3.5–5.3)
RBC # BLD: 3.45 M/UL — LOW (ref 4.2–5.8)
RBC # FLD: 15.6 % — HIGH (ref 10.3–14.5)
SODIUM SERPL-SCNC: 140 MMOL/L — SIGNIFICANT CHANGE UP (ref 135–145)
WBC # BLD: 14.71 K/UL — HIGH (ref 3.8–10.5)
WBC # FLD AUTO: 14.71 K/UL — HIGH (ref 3.8–10.5)

## 2017-09-24 PROCEDURE — 99233 SBSQ HOSP IP/OBS HIGH 50: CPT

## 2017-09-24 PROCEDURE — 99233 SBSQ HOSP IP/OBS HIGH 50: CPT | Mod: GC

## 2017-09-24 PROCEDURE — 99232 SBSQ HOSP IP/OBS MODERATE 35: CPT

## 2017-09-24 PROCEDURE — 71010: CPT | Mod: 26

## 2017-09-24 RX ORDER — ACETAMINOPHEN 500 MG
1000 TABLET ORAL ONCE
Qty: 0 | Refills: 0 | Status: COMPLETED | OUTPATIENT
Start: 2017-09-24 | End: 2017-09-24

## 2017-09-24 RX ORDER — ALPRAZOLAM 0.25 MG
0.25 TABLET ORAL ONCE
Qty: 0 | Refills: 0 | Status: DISCONTINUED | OUTPATIENT
Start: 2017-09-24 | End: 2017-09-24

## 2017-09-24 RX ORDER — MAGNESIUM SULFATE 500 MG/ML
2 VIAL (ML) INJECTION ONCE
Qty: 0 | Refills: 0 | Status: COMPLETED | OUTPATIENT
Start: 2017-09-24 | End: 2017-09-24

## 2017-09-24 RX ORDER — FUROSEMIDE 40 MG
10 TABLET ORAL ONCE
Qty: 0 | Refills: 0 | Status: COMPLETED | OUTPATIENT
Start: 2017-09-24 | End: 2017-09-24

## 2017-09-24 RX ORDER — IPRATROPIUM/ALBUTEROL SULFATE 18-103MCG
3 AEROSOL WITH ADAPTER (GRAM) INHALATION ONCE
Qty: 0 | Refills: 0 | Status: COMPLETED | OUTPATIENT
Start: 2017-09-24 | End: 2017-09-24

## 2017-09-24 RX ORDER — POTASSIUM PHOSPHATE, MONOBASIC POTASSIUM PHOSPHATE, DIBASIC 236; 224 MG/ML; MG/ML
15 INJECTION, SOLUTION INTRAVENOUS ONCE
Qty: 0 | Refills: 0 | Status: COMPLETED | OUTPATIENT
Start: 2017-09-24 | End: 2017-09-24

## 2017-09-24 RX ADMIN — FOSPHENYTOIN 108 MILLIGRAM(S) PE: 50 INJECTION INTRAMUSCULAR; INTRAVENOUS at 17:16

## 2017-09-24 RX ADMIN — SODIUM CHLORIDE 10 MILLILITER(S): 9 INJECTION INTRAMUSCULAR; INTRAVENOUS; SUBCUTANEOUS at 20:38

## 2017-09-24 RX ADMIN — POTASSIUM PHOSPHATE, MONOBASIC POTASSIUM PHOSPHATE, DIBASIC 62.5 MILLIMOLE(S): 236; 224 INJECTION, SOLUTION INTRAVENOUS at 08:12

## 2017-09-24 RX ADMIN — Medication 10 MILLIGRAM(S): at 20:28

## 2017-09-24 RX ADMIN — Medication 100 MILLIGRAM(S): at 05:28

## 2017-09-24 RX ADMIN — Medication 100 MILLIGRAM(S): at 22:51

## 2017-09-24 RX ADMIN — Medication 150 MILLIGRAM(S): at 21:39

## 2017-09-24 RX ADMIN — Medication 20 MILLIGRAM(S): at 05:26

## 2017-09-24 RX ADMIN — Medication 3 MILLILITER(S): at 21:12

## 2017-09-24 RX ADMIN — Medication 300 MILLIGRAM(S): at 13:08

## 2017-09-24 RX ADMIN — SODIUM CHLORIDE 4 MILLILITER(S): 9 INJECTION INTRAMUSCULAR; INTRAVENOUS; SUBCUTANEOUS at 16:07

## 2017-09-24 RX ADMIN — Medication 1 PUFF(S): at 21:34

## 2017-09-24 RX ADMIN — SODIUM CHLORIDE 4 MILLILITER(S): 9 INJECTION INTRAMUSCULAR; INTRAVENOUS; SUBCUTANEOUS at 09:29

## 2017-09-24 RX ADMIN — Medication 100 MILLIGRAM(S): at 14:08

## 2017-09-24 RX ADMIN — Medication 20 MILLIGRAM(S): at 17:06

## 2017-09-24 RX ADMIN — CEFEPIME 100 MILLIGRAM(S): 1 INJECTION, POWDER, FOR SOLUTION INTRAMUSCULAR; INTRAVENOUS at 05:26

## 2017-09-24 RX ADMIN — PHENYLEPHRINE HYDROCHLORIDE 21.66 MICROGRAM(S)/KG/MIN: 10 INJECTION INTRAVENOUS at 08:13

## 2017-09-24 RX ADMIN — SODIUM CHLORIDE 4 MILLILITER(S): 9 INJECTION INTRAMUSCULAR; INTRAVENOUS; SUBCUTANEOUS at 21:16

## 2017-09-24 RX ADMIN — Medication 3 MILLILITER(S): at 09:28

## 2017-09-24 RX ADMIN — DORNASE ALFA 2.5 MILLIGRAM(S): 1 SOLUTION RESPIRATORY (INHALATION) at 09:29

## 2017-09-24 RX ADMIN — Medication 1 PUFF(S): at 09:29

## 2017-09-24 RX ADMIN — MICAFUNGIN SODIUM 105 MILLIGRAM(S): 100 INJECTION, POWDER, LYOPHILIZED, FOR SOLUTION INTRAVENOUS at 13:07

## 2017-09-24 RX ADMIN — SODIUM CHLORIDE 4 MILLILITER(S): 9 INJECTION INTRAMUSCULAR; INTRAVENOUS; SUBCUTANEOUS at 03:12

## 2017-09-24 RX ADMIN — Medication 1 MILLIGRAM(S): at 13:07

## 2017-09-24 RX ADMIN — Medication 1000 MILLIGRAM(S): at 17:21

## 2017-09-24 RX ADMIN — PANTOPRAZOLE SODIUM 40 MILLIGRAM(S): 20 TABLET, DELAYED RELEASE ORAL at 05:28

## 2017-09-24 RX ADMIN — Medication 3 MILLILITER(S): at 03:12

## 2017-09-24 RX ADMIN — PREGABALIN 500 MICROGRAM(S): 225 CAPSULE ORAL at 13:08

## 2017-09-24 RX ADMIN — PHENYLEPHRINE HYDROCHLORIDE 21.66 MICROGRAM(S)/KG/MIN: 10 INJECTION INTRAVENOUS at 21:39

## 2017-09-24 RX ADMIN — PANTOPRAZOLE SODIUM 40 MILLIGRAM(S): 20 TABLET, DELAYED RELEASE ORAL at 17:06

## 2017-09-24 RX ADMIN — DORNASE ALFA 2.5 MILLIGRAM(S): 1 SOLUTION RESPIRATORY (INHALATION) at 21:16

## 2017-09-24 RX ADMIN — Medication 50 GRAM(S): at 09:22

## 2017-09-24 RX ADMIN — Medication 100 MILLIGRAM(S): at 13:08

## 2017-09-24 RX ADMIN — Medication 0.25 MILLIGRAM(S): at 21:48

## 2017-09-24 RX ADMIN — CEFEPIME 100 MILLIGRAM(S): 1 INJECTION, POWDER, FOR SOLUTION INTRAMUSCULAR; INTRAVENOUS at 17:06

## 2017-09-24 RX ADMIN — Medication 200 MILLIGRAM(S): at 07:43

## 2017-09-24 RX ADMIN — Medication 400 MILLIGRAM(S): at 17:06

## 2017-09-24 RX ADMIN — Medication 3 MILLILITER(S): at 16:07

## 2017-09-24 RX ADMIN — SODIUM CHLORIDE 10 MILLILITER(S): 9 INJECTION INTRAMUSCULAR; INTRAVENOUS; SUBCUTANEOUS at 08:13

## 2017-09-24 RX ADMIN — Medication 0.25 MILLIGRAM(S): at 18:43

## 2017-09-24 RX ADMIN — Medication 150 MILLIGRAM(S): at 09:20

## 2017-09-24 RX ADMIN — FOSPHENYTOIN 108 MILLIGRAM(S) PE: 50 INJECTION INTRAMUSCULAR; INTRAVENOUS at 07:37

## 2017-09-24 NOTE — PROGRESS NOTE ADULT - ASSESSMENT
82M CAD s/p BMS to LAD in June 2017, HLD, BPH, HTN, asthma, CVA, s/p esophagogastrectomy last month with anastomotic leak, with nonfunctioning J tube, pleural effusion with pigtail catheter places, and NSTEMI in the setting of septic shock. TTE showing grossly normal LV function    NSTEMI- likely demand ischemia precipitated by sepsis: Remains on pressors/intubated  - Given recent BMS would continue aspirin 81 mg daily; okay to hold clopidogrel 75 mg daily at this time and resume when surgery okays   - Currently on pressors, antihypertensives and BB held    - Further ischemic eval deferred pending resolution of acute issues including septic shock and respiratory failure.

## 2017-09-24 NOTE — PROGRESS NOTE ADULT - SUBJECTIVE AND OBJECTIVE BOX
f/u anastomotic leak and loculated R pleural effusion    interval history/ROS:  extubated yesterday. sitting in bed side chair.  c/o cough    Allergies  penicillin (Rash)    cefepime  IVPB 1000 every 12 hours  vancomycin  IVPB 750 every 12 hours  metroNIDAZOLE  IVPB    metroNIDAZOLE  IVPB 500 every 8 hours  micafungin IVPB 100 every 24 hours  micafungin IVPB        MEDICATIONS  (STANDING):  MEDICATIONS  (STANDING):  fosphenytoin IVPB 200 every 12 hours  pantoprazole  Injectable 40 two times a day  methylPREDNISolone sodium succinate Injectable 20 two times a day  fluticasone propionate   220 MICROgram(s) HFA Inhaler 1 two times a day  aspirin Suppository 300 daily  phenylephrine    Infusion 0.8 <Continuous>  ALBUTerol/ipratropium for Nebulization 3 every 6 hours  dornase ziyad Solution 2.5 two times a day  sodium chloride 3%  Inhalation 4 every 6 hours  acetaminophen  IVPB. 1000 once PRN  guaiFENesin    Syrup 200 every 6 hours PRN  HYDROcodone/homatropine Syrup 5 every 4 hours PRN      Vital Signs Last 24 Hrs  Vital Signs Last 24 Hrs  T(F): 97.6 (09-24-17 @ 09:00), Max: 98.8 (09-24-17 @ 04:00)  HR: 90 (09-24-17 @ 11:00)  BP: 102/51 (09-24-17 @ 11:00)  RR: 28 (09-24-17 @ 11:00)  SpO2: 100% (09-24-17 @ 11:00) (95% - 100%) --    PHYSICAL EXAM:  General: non-toxic, weak, suctioning oral sectrections  HEAD/EYES: Lt IJ  ENT:  supple  Cardiovascular:   S1, S2  Respiratory:  clear ant  pigtail drain right chest  GI:  soft, non-tender, normal bowel sounds, feeding tube  :  garza  Musculoskeletal:  no synovitis  Neurologic:  non focal  Skin:  no rash  Psychiatric:  alert                          10.5   11.22 )-----------( 247      ( 22 Sep 2017 03:55 )             32.3 09-22    139  |  109  |  16  ----------------------------<  195  4.0   |  19  |  0.53  Ca    8.0      22 Sep 2017 03:55Phos  2.6     09-22Mg     2.0     09-22  TPro  5.7  /  Alb  1.9  /  TBili  0.5  /  DBili  x   /  AST  153  /  ALT  134  /  AlkPhos  177  09-21    MICROBIOLOGY:    Culture - Body Fluid with Gram Stain (09.21.17 @ 21:18)    Gram Stain:   WBC^White Blood Cells  QNTY CELLS IN GRAM STAIN: NO CELLS SEEN  NOS^No Organisms Seen    Culture - Body Fluid:   NO ORGANISMS ISOLATED AT 24 HOURS  NO ORGANISMS ISOLATED AT 48 HRS.    Specimen Source: PLEURAL FLUID    Culture - Blood (09.21.17 @ 02:01)    -  Coagulase negative Staphylococcus: + DETECT ZOË    Culture - Blood:   NO ORGANISMS ISOLATED  NO ORGANISMS ISOLATED AT 24 HOURS    Culture - Blood:   ***Blood Panel PCR results on this specimen are available  approximately 3 hours after the Gram stain result***  Gram stain, PCR, and/or culture results may not always  correspond due to difference in methodologies  ------------------------------------------------------------  This PCR assay was performed using [employer]  The  following targets are tested for:  Enterococcus, vancomycin  resistant enterococci, Listeria monocytogenes,  coagulase  negative staphylococci, S. aureus, methicillin resistant S.  aureus, Streptococcus agalactiae (Group B), S. pneumoniae,  S. pyogenes (Group A), Acinetobacter baumannii, Enterobacter  cloacae, E. coli, Klebsiella oxytoca, K. pneumoniae, Proteus  sp., Serratia marcescens, Haemophilus influenzae, Neisseria  meningitidis, Pseudomonas aeruginosa, Candida albicans, C.  glabrata, C. krusei, C. parapsilosis, C. tropicalis and the  KPC resistance gene.    Specimen Source: BLOOD    Organism: BLOOD CULTURE PCR    Organism: Staphylococcus epidermidis    Gram Stain Blood:   ***** CRITICAL RESULT *****  PERSON CALLED / READ-BACK: AYUSH MARKS RN  DATE / TIME CALLED: 09/22/17 0542  CALLED BY: JARAD SEGUNDO  GPCCL^Gram Pos Cocci In Clusters  AFTER: 26 HOURS INCUBATION  BOTTLE: ANAEROBIC BOTTLE    Organism Identification: BLOOD CULTURE PCR  Staphylococcus epidermidis    Method Type: PCR             RADIOLOGY:  CT Abdomen and Pelvis w/ IV Cont (09.21.17 @ 12:50)   IMPRESSION:   Status post esophagectomy and gastric pull-through with adjacent extraluminal contrast consistent with known leak.  Tracheostomy tube in place.  Moderate sized pleural effusions with loculations.  Evidence of pericarditis with small to moderate-sized pericardial effusion.  J-tube in place.  Small volume ascites. Anasarca.    Xray Esophagram (09.19.17 @ 12:08)   IMPRESSION:  Status post Vance Trent. Findings as described above consistent with a leak at the distal site just above the hemidiaphragm into the pleural space.       Transthoracic Echocardiogram (09.18.17 @ 10:15) >  CONCLUSIONS:  1. Mitral annular calcification and calcified mitral leaflets with normal diastolic opening.  2. Calcified aortic valve with decreased opening.  3. Low Normal left ventricular systolic function. EF 50%  4. The right ventricle is not well visualized; grossly normal right ventricular systolic function.  Vancomycin Level, Trough (09.22.17 @ 19:34)    Vancomycin Level, Trough: 17.9: Vancomycin trough levels should be rapidly reached and  maintained at 15-20 ug/ml for life threatening MRSA  infections such as sepsis, endocarditis, osteomyelitis and  pneumonia. A first trough level should be drawn before the  3rd or 4th dose. Risk17.9: of renal toxicity is increased for  levels >15 ug/mL, in patients on other nephrotoxic drugs,  who are hemodynamically unstable, have unstable renal  function, or are on vancomycin therapy for >14 days. Renal  function with creatinine levels should be17.9:  monitored for  those patients. ug/mL    Vancomycin Level, Trough (09.22.17 @ 19:34)    Vancomycin Level, Trough: 17.9: Vancomycin trough levels should be rapidly reached and  maintained at 15-20 ug/ml for life threatening MRSA  infections such as sepsis, endocarditis, osteomyelitis and  pneumonia. A first trough level should be drawn before the  3rd or 4th dose. Risk17.9: of renal toxicity is increased for  levels >15 ug/mL, in patients on other nephrotoxic drugs,  who are hemodynamically unstable, have unstable renal  function, or are on vancomycin therapy for >14 days. Renal  function with creatinine levels should be17.9:  monitored for  those patients. ug/mL

## 2017-09-24 NOTE — PROGRESS NOTE ADULT - SUBJECTIVE AND OBJECTIVE BOX
MEJIA LATIF  MRN-[id number]    HPI:  82 M presents with non-functioning J tube and SOB. Patient recently underwent a robotic-assisted [place]-Trent esophagogastrectomy with feeding J tube placement for esophageal adenocarcinoma (T1aN0) with Dr. Dyer and Dr. Christian Weinstein on 8/14/17. After surgery, patient was found to have an anastomotic leak and was treated conservatively with NPO, antibiotics, chest tube drainage, and J tube feeding. Patient was eventually discharged to rehab. On day of evaluation, patient's J tube was unable to be used, and was sent to the ED for evaluation. Patient also complains of SOB, but no chest pain or abdominal pain. Denies fever or chills. Patient was taking medications by mouth, but was only getting nutrition via his J tube. Esophagram done on 9/19/17 showing  a leak at the distal site just above the hemidiaphragm into the pleural space.   Patient was found to have a Left pleural effusion. Patent received pigtail and developed respiratory distress. Patient was transferred to the SICU where he was intubated. Patient also with right GINA drain.  Patient is now being transferred to [facility]ICU for optimal management. (20 Sep 2017 23:45)  R-Pigtail discontinued today       Procedure: [place] Trent esophagectomy  POD# 41    Issues:  Respiratory failure  Hypotension  Pleural effusion  Esophageal leak  Seizure disorder  NSTEMI      PAST MEDICAL & SURGICAL HISTORY:  Coronary artery disease: s/p 3 stents on June 30, 2017 at [place] Dr. Lavelle Reid  OA (osteoarthritis) of knee: right  Former smoker, stopped smoking in distant past  Rheumatoid arthritis  Seizure disorder: 1 episode approximately 15 yrs ago  Asthma  Hyperlipidemia  BPH (benign prostatic hyperplasia)  HTN (hypertension)  Esophagus cancer  Chronic allergic rhinitis  BCC (basal cell carcinoma): skin  Cerebrovascular accident (CVA)  Anxiety  Anastomotic leak following esophagectomy: Vance trent esophagectomy  History of tonsillectomy  H/O heart artery stent: June 30, 2017  Knee arthropathy: left  History of total hip replacement: left  History of hernia repair      ***VITAL SIGNS:  Vital Signs Last 24 Hrs  T(C): 36.6 (24 Sep 2017 12:00), Max: 37.1 (24 Sep 2017 04:00)  T(F): 97.8 (24 Sep 2017 12:00), Max: 98.8 (24 Sep 2017 04:00)  HR: 91 (24 Sep 2017 18:00) (88 - 96)  BP: 104/64 (24 Sep 2017 18:00) (94/74 - 128/58)  BP(mean): 75 (24 Sep 2017 18:00) (60 - 81)  RR: 17 (24 Sep 2017 18:00) (15 - 28)  SpO2: 100% (24 Sep 2017 18:00) (95% - 100%)  I/Os:   I&O's Detail    23 Sep 2017 07:01  -  24 Sep 2017 07:00  --------------------------------------------------------  IN:    dexmedetomidine Infusion: 68.4 mL    Enteral Tube Flush: 200 mL    fentaNYL  Infusion: 28.8 mL    IV PiggyBack: 700 mL    Jevity: 1200 mL    phenylephrine   Infusion: 300.2 mL    propofol Infusion: 15.1 mL    sodium chloride 0.9%.: 230 mL  Total IN: 2742.5 mL    OUT:    Chest Tube: 80 mL    Indwelling Catheter - Urethral: 2905 mL    Nasoenteral Tube: 20 mL  Total OUT: 3005 mL    Total NET: -262.5 mL      24 Sep 2017 07:01  -  24 Sep 2017 18:29  --------------------------------------------------------  IN:    Enteral Tube Flush: 50 mL    IV PiggyBack: 250 mL    Jevity: 660 mL    phenylephrine   Infusion: 54 mL    sodium chloride 0.9%.: 110 mL  Total IN: 1124 mL    OUT:    Indwelling Catheter - Urethral: 700 mL  Total OUT: 700 mL    Total NET: 424 mL                ======================= PHYSICAL EXAM============================  General:                         Awake, alert, not in any distress  Neuro:                            Moving all extremities to commands. No acute change from baseline exam.	  Respiratory:	Lungs clear to auscultation bilaterally. Good aeration.                                           No rales, rhonchi. Effort even and unlabored.  CV:		Regular rate and rhythm. Normal S1/S2. No murmurs                                          Distal pulses present.  Abdomen:	                     Soft, non-distended. Bowel sounds present / absent.   Skin:		No rash.  Extremities:	Warm, no cyanosis or edema.  Palpable pulses    ============================ LABS =========================                        10.2   14.71 )-----------( 214      ( 24 Sep 2017 04:30 )             32.4     09-24    140  |  108<H>  |  18  ----------------------------<  110<H>  3.9   |  26  |  0.53    Ca    8.4      24 Sep 2017 04:30  Phos  2.1     09-24  Mg     1.7     09-24          ABG - ( 23 Sep 2017 12:10 )  pH: 7.38  /  pCO2: 39    /  pO2: 98    / HCO3: 23    / Base Excess: -1.5  /  SaO2: 98.2        ===================== IMAGING STUDIES =========================  EXAM:  RAD CHEST PORTABLE IMMEDIATE    PROCEDURE DATE:  Sep 24 2017         INTERPRETATION:  CLINICAL INDICATION: follow-up status post chest tube   removal    EXAM:  Portable AP chest from 9/24/2017 at 0712 and 1333. Compared to prior   study from 9/23/2017 at 0711.    IMPRESSION:  Interval extubation and removal of previously seen enteric tube, pigtail   drainage catheter from peripheral right hemithorax, and smaller caliber   pigtail drainage catheter from lower medial left hemithorax.    Right mediastinal drain remains in place.     Persistent small bilateral pleural reactions and bibasilar increased   markings, right greater than left.    No pneumothorax.  Stable cardiac and mediastinal silhouettes.          =======================  MEDICATIONS  =================  MEDICATIONS  (STANDING):  cefepime  IVPB 1000 milliGRAM(s) IV Intermittent every 12 hours  vancomycin  IVPB 750 milliGRAM(s) IV Intermittent every 12 hours  metroNIDAZOLE  IVPB      metroNIDAZOLE  IVPB 500 milliGRAM(s) IV Intermittent every 8 hours  chlorhexidine 4% Liquid 1 Application(s) Topical daily  fosphenytoin IVPB 200 milliGRAM(s) PE IV Intermittent every 12 hours  folic acid Injectable 1 milliGRAM(s) IV Push daily  pyridoxine Injectable 100 milliGRAM(s) IV Push daily  cyanocobalamin Injectable 500 MICROGram(s) SubCutaneous daily  pantoprazole  Injectable 40 milliGRAM(s) IV Push two times a day  methylPREDNISolone sodium succinate Injectable 20 milliGRAM(s) IV Push two times a day  micafungin IVPB 100 milliGRAM(s) IV Intermittent every 24 hours  micafungin IVPB      fluticasone propionate   220 MICROgram(s) HFA Inhaler 1 Puff(s) Inhalation two times a day  sodium chloride 0.9%. 1000 milliLiter(s) (10 mL/Hr) IV Continuous <Continuous>  aspirin Suppository 300 milliGRAM(s) Rectal daily  phenylephrine    Infusion 0.8 MICROgram(s)/kG/Min (21.66 mL/Hr) IV Continuous <Continuous>  ALBUTerol/ipratropium for Nebulization 3 milliLiter(s) Nebulizer every 6 hours  dornase ziyad Solution 2.5 milliGRAM(s) Inhalation two times a day  sodium chloride 3%  Inhalation 4 milliLiter(s) Inhalation every 6 hours  acetaminophen  IVPB. 1000 milliGRAM(s) IV Intermittent once  ALPRAZolam 0.25 milliGRAM(s) Oral once    MEDICATIONS  (PRN):  guaiFENesin    Syrup 200 milliGRAM(s) Oral every 6 hours PRN Cough  HYDROcodone/homatropine Syrup 5 milliLiter(s) Oral every 4 hours PRN Cough        ====================ASSESMENT AND PLAN ==============  83 Y/O Male POD#41 s/p Vance Trent esophagectomy, was discharged to rehab,   Re-admitted to [facility] for non-functioning J-tube, pleural effusion, got intubated while in SICU, transferred to [facility] for further treatment. EGD showed contained leak.  Successfully Extubated yesterday.     ====================== NEUROLOGY=====================  Pain control with Fentanyl /Tylenol IV   Patient alert, Awake, comfortable, NAD    ==================== RESPIRATORY======================  Pt is on 2     L nasal canula  Comfortable, not in any distress.  Using incentive spirometry  GINA to low continuos suction,   Continue bronchodilators, pulmonary toilet  R-Pigtail discontinued today     Continue bronchodilators, pulmonary toilet      ====================CARDIOVASCULAR==================  Lasix to keep I/Os negative  Continue hemodynamic monitoring.  Continue cardiovascular / antihypertensive medications    ===================== RENAL =========================  Lasix to keep I/Os negative  Monitor I/Os and electrolytes  D/C López          ==================== GASTROINTESTINAL===================  NPO  Jevity tube feeding, tolerating  Continue GI prophylaxis with Protonix  Continue Zofran / Reglan for nausea - PRN	    =======================    ENDOCRIN  =====================  Glycemic monitoring  F/S with coverage    ===================HEMATOLOGIC/ONC ===================  Monitor chest tube output. No signs of active bleeding.   Follow CBC in AM  DVT prophylaxis with SCD, sc Heparin    ========================INFECTIOUS DISEASE================  Continue Vanco / Cefipime / Flagyl / Micafungin  Follow culture- of pleural effusion      Pertinent clinical, laboratory, radiographic, hemodynamic, echocardiographic, respiratory data, microbiologic data and chart were reviewed and analyzed frequently throughout the course of the day and night. GI and DVT prophylaxis, glycemic control, head of bed elevation and skin care issues were addressed.  Patient seen, examined and plan discussed with CT Surgery / CTICU team during rounds.    I have spent         35      minutes of critical care time with this       Dudley Rodriges MD

## 2017-09-24 NOTE — PROGRESS NOTE ADULT - SUBJECTIVE AND OBJECTIVE BOX
D-Team (Surgical Oncology) Daily Progress Note    SUBJECTIVE:  Pt seen and examined, and is resting comfortably in bed. No acute events overnight. Pain is adequately controlled on current regimen. Pt has no complaints at this time.     OBJECTIVE:  Vital Signs Last 24 Hrs  T(C): 36.4 (24 Sep 2017 09:00), Max: 37.5 (23 Sep 2017 12:00)  T(F): 97.6 (24 Sep 2017 09:00), Max: 99.5 (23 Sep 2017 12:00)  HR: 90 (24 Sep 2017 11:00) (77 - 96)  BP: 102/51 (24 Sep 2017 11:00) (94/74 - 128/58)  BP(mean): 64 (24 Sep 2017 11:00) (60 - 81)  RR: 28 (24 Sep 2017 11:00) (15 - 28)  SpO2: 100% (24 Sep 2017 11:00) (95% - 100%)    I&O's Detail    23 Sep 2017 07:01  -  24 Sep 2017 07:00  --------------------------------------------------------  IN:    dexmedetomidine Infusion: 68.4 mL    Enteral Tube Flush: 200 mL    fentaNYL  Infusion: 28.8 mL    IV PiggyBack: 700 mL    Jevity: 1200 mL    phenylephrine   Infusion: 300.2 mL    propofol Infusion: 15.1 mL    sodium chloride 0.9%.: 230 mL  Total IN: 2742.5 mL    OUT:    Chest Tube: 80 mL    Indwelling Catheter - Urethral: 2905 mL    Nasoenteral Tube: 20 mL  Total OUT: 3005 mL    Total NET: -262.5 mL      24 Sep 2017 07:01  -  24 Sep 2017 11:23  --------------------------------------------------------  IN:    IV PiggyBack: 250 mL    Jevity: 240 mL    phenylephrine   Infusion: 16.2 mL    sodium chloride 0.9%.: 40 mL  Total IN: 546.2 mL    OUT:    Indwelling Catheter - Urethral: 240 mL  Total OUT: 240 mL    Total NET: 306.2 mL        Exam:  GEN: A&Ox3, NAD  HEENT: atraumatic, normocephalic  CV: no JVD  RESP: no increased work of breathing, no use of accessory muscles  GI/ABD: S/NT/ND  : deferred  EXTREMITIES: warm, pink, well-perfused                        10.2   14.71 )-----------( 214      ( 24 Sep 2017 04:30 )             32.4       09-24    140  |  108<H>  |  18  ----------------------------<  110<H>  3.9   |  26  |  0.53    Ca    8.4      24 Sep 2017 04:30  Phos  2.1     09-24  Mg     1.7     09-24      ASSESSMENT:  82M esophageal ca s/p chemo/RT followed by [place]-Trent esophagogastrectomy with feeding J tube. Complicated by leak s/p IV abx    PLAN:    - J-tube feeds   - Monitor respiratory status  - Wean off pressors as tolerated   - Care per CTICU      Laura Gaines MD PGY-1  pager: [phone]

## 2017-09-24 NOTE — PHYSICAL THERAPY INITIAL EVALUATION ADULT - PERTINENT HX OF CURRENT PROBLEM, REHAB EVAL
82M CAD/stent, OA, RA on steroids, seizures, chol, asthma, htn, CVA, esophageal ca s/p chemo/RT followed by Vance-Trent esophagogastrectomy 8/14/17 with feeding J tube. 8/21/17, diagnosed with anastomotic leak s/p IV antibiotics (8/20/17 - 8/23/17), NPO - J-tube feeds.  Here with SOB likely due to persistent leak and b/l effusions.

## 2017-09-24 NOTE — PROGRESS NOTE ADULT - SUBJECTIVE AND OBJECTIVE BOX
MEJIA LATIF:[id number],   82yMale followed for: esophageal cancer  penicillin (Rash)    PAST MEDICAL & SURGICAL HISTORY:  Coronary artery disease: s/p 3 stents on June 30, 2017 at [facility]  OA (osteoarthritis) of knee: right  Former smoker, stopped smoking in distant past  Rheumatoid arthritis  Seizure disorder: 1 episode approximately 15 yrs ago  Asthma  Hyperlipidemia  BPH (benign prostatic hyperplasia)  HTN (hypertension)  Esophagus cancer  Chronic allergic rhinitis  BCC (basal cell carcinoma): skin  Cerebrovascular accident (CVA)  Anxiety  Anastomotic leak following esophagectomy: [facility] esophagectomy  History of tonsillectomy  H/O heart artery stent: June 30, 2017  Knee arthropathy: left  History of total hip replacement: left  History of hernia repair    FAMILY HISTORY:  Family history of heart attack (Sibling)  Family history of pulmonary embolism: father @ 49 yr old  FH: CAD (coronary artery disease)    MEDICATIONS  (STANDING):  cefepime  IVPB 1000 milliGRAM(s) IV Intermittent every 12 hours  vancomycin  IVPB 750 milliGRAM(s) IV Intermittent every 12 hours  metroNIDAZOLE  IVPB      metroNIDAZOLE  IVPB 500 milliGRAM(s) IV Intermittent every 8 hours  chlorhexidine 4% Liquid 1 Application(s) Topical daily  fosphenytoin IVPB 200 milliGRAM(s) PE IV Intermittent every 12 hours  folic acid Injectable 1 milliGRAM(s) IV Push daily  pyridoxine Injectable 100 milliGRAM(s) IV Push daily  cyanocobalamin Injectable 500 MICROGram(s) SubCutaneous daily  pantoprazole  Injectable 40 milliGRAM(s) IV Push two times a day  methylPREDNISolone sodium succinate Injectable 20 milliGRAM(s) IV Push two times a day  micafungin IVPB 100 milliGRAM(s) IV Intermittent every 24 hours  micafungin IVPB      fluticasone propionate   220 MICROgram(s) HFA Inhaler 1 Puff(s) Inhalation two times a day  sodium chloride 0.9%. 1000 milliLiter(s) (10 mL/Hr) IV Continuous <Continuous>  aspirin Suppository 300 milliGRAM(s) Rectal daily  phenylephrine    Infusion 0.8 MICROgram(s)/kG/Min (21.66 mL/Hr) IV Continuous <Continuous>  ALBUTerol/ipratropium for Nebulization 3 milliLiter(s) Nebulizer every 6 hours  dornase ziyad Solution 2.5 milliGRAM(s) Inhalation two times a day  sodium chloride 3%  Inhalation 4 milliLiter(s) Inhalation every 6 hours  magnesium sulfate  IVPB 2 Gram(s) IV Intermittent once    MEDICATIONS  (PRN):  acetaminophen  IVPB. 1000 milliGRAM(s) IV Intermittent once PRN Moderate Pain (4 - 6)  guaiFENesin    Syrup 200 milliGRAM(s) Oral every 6 hours PRN Cough  HYDROcodone/homatropine Syrup 5 milliLiter(s) Oral every 4 hours PRN Cough      Vital Signs Last 24 Hrs  T(C): 37.1 (24 Sep 2017 04:00), Max: 37.5 (23 Sep 2017 12:00)  T(F): 98.8 (24 Sep 2017 04:00), Max: 99.5 (23 Sep 2017 12:00)  HR: 90 (24 Sep 2017 08:00) (73 - 96)  BP: 110/60 (24 Sep 2017 08:00) (94/74 - 144/80)  BP(mean): 70 (24 Sep 2017 08:00) (60 - 95)  RR: 24 (24 Sep 2017 08:00) (15 - 27)  SpO2: 99% (24 Sep 2017 08:00) (95% - 100%)  nc/at  s1s2  cta  soft, nt, nd no guarding or rebound  no c/c/e    CBC Full  -  ( 24 Sep 2017 04:30 )  WBC Count : 14.71 K/uL  Hemoglobin : 10.2 g/dL  Hematocrit : 32.4 %  Platelet Count - Automated : 214 K/uL  Mean Cell Volume : 93.9 fL  Mean Cell Hemoglobin : 29.6 pg  Mean Cell Hemoglobin Concentration : 31.5 %  Auto Neutrophil # : x  Auto Lymphocyte # : x  Auto Monocyte # : x  Auto Eosinophil # : x  Auto Basophil # : x  Auto Neutrophil % : x  Auto Lymphocyte % : x  Auto Monocyte % : x  Auto Eosinophil % : x  Auto Basophil % : x    09-24    140  |  108<H>  |  18  ----------------------------<  110<H>  3.9   |  26  |  0.53    Ca    8.4      24 Sep 2017 04:30  Phos  2.1     09-24  Mg     1.7     09-24

## 2017-09-24 NOTE — PROGRESS NOTE ADULT - SUBJECTIVE AND OBJECTIVE BOX
HISTORY OF PRESENT ILLNESS: Patient seen.  Feeling okay.  Maintained on phenylephrine for BP support.     Allergies    penicillin (Rash)    Intolerances    	    MEDICATIONS:  phenylephrine    Infusion 0.8 MICROgram(s)/kG/Min IV Continuous <Continuous>    cefepime  IVPB 1000 milliGRAM(s) IV Intermittent every 12 hours  vancomycin  IVPB 750 milliGRAM(s) IV Intermittent every 12 hours  metroNIDAZOLE  IVPB      metroNIDAZOLE  IVPB 500 milliGRAM(s) IV Intermittent every 8 hours  micafungin IVPB 100 milliGRAM(s) IV Intermittent every 24 hours  micafungin IVPB        fluticasone propionate   220 MICROgram(s) HFA Inhaler 1 Puff(s) Inhalation two times a day  ALBUTerol/ipratropium for Nebulization 3 milliLiter(s) Nebulizer every 6 hours  dornase ziyad Solution 2.5 milliGRAM(s) Inhalation two times a day  sodium chloride 3%  Inhalation 4 milliLiter(s) Inhalation every 6 hours  guaiFENesin    Syrup 200 milliGRAM(s) Oral every 6 hours PRN  HYDROcodone/homatropine Syrup 5 milliLiter(s) Oral every 4 hours PRN    fosphenytoin IVPB 200 milliGRAM(s) PE IV Intermittent every 12 hours  aspirin Suppository 300 milliGRAM(s) Rectal daily  acetaminophen  IVPB. 1000 milliGRAM(s) IV Intermittent once PRN    pantoprazole  Injectable 40 milliGRAM(s) IV Push two times a day    methylPREDNISolone sodium succinate Injectable 20 milliGRAM(s) IV Push two times a day    chlorhexidine 4% Liquid 1 Application(s) Topical daily  folic acid Injectable 1 milliGRAM(s) IV Push daily  pyridoxine Injectable 100 milliGRAM(s) IV Push daily  cyanocobalamin Injectable 500 MICROGram(s) SubCutaneous daily  sodium chloride 0.9%. 1000 milliLiter(s) IV Continuous <Continuous>      PAST MEDICAL & SURGICAL HISTORY:  Coronary artery disease: s/p 3 stents on June 30, 2017 at [place] Dr. Lav[facility]  OA (osteoarthritis) of knee: right  Former smoker, stopped smoking in distant past  Rheumatoid arthritis  Seizure disorder: 1 episode approximately 15 yrs ago  Asthma  Hyperlipidemia  BPH (benign prostatic hyperplasia)  HTN (hypertension)  Esophagus cancer  Chronic allergic rhinitis  BCC (basal cell carcinoma): skin  Cerebrovascular accident (CVA)  Anxiety  Anastomotic leak following esophagectomy: [place] [facility] esophagectomy  History of tonsillectomy  H/O heart artery stent: June 30, 2017  Knee arthropathy: left  History of total hip replacement: left  History of hernia repair      FAMILY HISTORY:  Family history of heart attack (Sibling)  Family history of pulmonary embolism: father @ 49 yr old  FH: CAD (coronary artery disease)      SOCIAL HISTORY:      REVIEW OF SYSTEMS:  See HPI. Otherwise, 10 point ROS done and otherwise negative.    PHYSICAL EXAM:  T(C): 36.4 (09-24-17 @ 09:00), Max: 37.5 (09-23-17 @ 12:00)  HR: 90 (09-24-17 @ 11:00) (77 - 96)  BP: 102/51 (09-24-17 @ 11:00) (94/74 - 128/58)  RR: 28 (09-24-17 @ 11:00) (15 - 28)  SpO2: 100% (09-24-17 @ 11:00) (95% - 100%)  Wt(kg): --  I&O's Summary    23 Sep 2017 07:01  -  24 Sep 2017 07:00  --------------------------------------------------------  IN: 2742.5 mL / OUT: 3005 mL / NET: -262.5 mL    24 Sep 2017 07:01  -  24 Sep 2017 11:33  --------------------------------------------------------  IN: 546.2 mL / OUT: 240 mL / NET: 306.2 mL        Appearance: Normal	  HEENT:   Normal oral mucosa, PERRL, EOMI	  Lymphatic: No lymphadenopathy  Cardiovascular: Normal S1 S2, No JVD, No murmurs, No edema  Respiratory: Lungs clear to auscultation	  Psychiatry: A & O x 3, Mood & affect appropriate  Gastrointestinal:  Soft, Non-tender, + BS	  Skin: No rashes, No ecchymoses, No cyanosis	  Neurologic: Non-focal  Extremities: Normal range of motion, No clubbing, cyanosis or edema  Vascular: warm, no edema, DP pulses present bilaterally    LABS:	 	    CBC Full  -  ( 24 Sep 2017 04:30 )  WBC Count : 14.71 K/uL  Hemoglobin : 10.2 g/dL  Hematocrit : 32.4 %  Platelet Count - Automated : 214 K/uL  Mean Cell Volume : 93.9 fL  Mean Cell Hemoglobin : 29.6 pg  Mean Cell Hemoglobin Concentration : 31.5 %      09-24    140  |  108<H>  |  18  ----------------------------<  110<H>  3.9   |  26  |  0.53  09-23    140  |  111<H>  |  17  ----------------------------<  132<H>  4.0   |  22  |  0.55    Ca    8.4      24 Sep 2017 04:30  Ca    8.3<L>      23 Sep 2017 03:30  Phos  2.1     09-24  Phos  2.7     09-23  Mg     1.7     09-24  Mg     1.9     09-23          CARDIAC MARKERS:            TELEMETRY: SR 80s

## 2017-09-24 NOTE — PROGRESS NOTE ADULT - ASSESSMENT
await decision re: leak.  ? conservative vs, endoscopic (discussed with dr. Oneal Weinstein at [facility] re: bear claw/ clip) vs. surgical revision.  will speak with surgical team

## 2017-09-24 NOTE — PROGRESS NOTE ADULT - ASSESSMENT
82M CAD/stent, OA, RA on steroids, seizures, chol, asthma, htn, CVA, esophageal ca s/p chemo/RT followed by [facility] esophagogastrectomy 8/14/17 with feeding J tube. 8/21/17, diagnosed with anastomotic leak s/p IV antibiotics (8/20/17 - 8/23/17), NPO - J-tube feeds.  Here with SOB likely due to persistent leak and b/l effusions.  On empiric antimicrobials (since 9/15-) including antifungal therapy since (9/20-). CT with loculated R pleural effusion, L pleural effusion and pericardial effusion/pericarditis.  All pleural fluid with negative cultures.  9/21 s/p EGD:   Perforation in esophagus at approximately 28cm from incisors; Perforation in stomach at approximately 40cm from incisors.  Also, several BC with CNS - the last 2 sets with CNS but were drawn from central line (after it was newly placed).      Empiric vanc/cefepime/micafungin  Per Thoracic - conservative management for now - may or may not need intervention but he is also s/p NSTEMI 82M CAD/stent, OA, RA on steroids, seizures, chol, asthma, htn, CVA, esophageal ca s/p chemo/RT followed by [facility] esophagogastrectomy 8/14/17 with feeding J tube. 8/21/17, diagnosed with anastomotic leak s/p IV antibiotics (8/20/17 - 8/23/17), NPO - J-tube feeds.  Here with SOB likely due to persistent leak and b/l effusions.  On empiric antimicrobials (since 9/15-) including antifungal therapy since (9/20-). CT with loculated R pleural effusion, L pleural effusion and pericardial effusion/pericarditis.  All pleural fluid with negative cultures.  9/21 s/p EGD:   Perforation in esophagus at approximately 28cm from incisors; Perforation in stomach at approximately 40cm from incisors.  Also, several BC with CNS - the last 2 sets with CNS but were drawn from central line (after it was newly placed).  ?contaminant.  Would repeat blood culture x 2  Empiric vanc/cefepime/micafungin  vanco dose therapeutic  Per Thoracic - conservative management for now - may or may not need intervention but he is also s/p NSTEMI

## 2017-09-25 LAB
BUN SERPL-MCNC: 18 MG/DL — SIGNIFICANT CHANGE UP (ref 7–23)
CALCIUM SERPL-MCNC: 8.5 MG/DL — SIGNIFICANT CHANGE UP (ref 8.4–10.5)
CHLORIDE SERPL-SCNC: 107 MMOL/L — SIGNIFICANT CHANGE UP (ref 98–107)
CO2 SERPL-SCNC: 26 MMOL/L — SIGNIFICANT CHANGE UP (ref 22–31)
CREAT SERPL-MCNC: 0.53 MG/DL — SIGNIFICANT CHANGE UP (ref 0.5–1.3)
GLUCOSE SERPL-MCNC: 115 MG/DL — HIGH (ref 70–99)
HCT VFR BLD CALC: 30.6 % — LOW (ref 39–50)
HGB BLD-MCNC: 9.6 G/DL — LOW (ref 13–17)
MCHC RBC-ENTMCNC: 29.4 PG — SIGNIFICANT CHANGE UP (ref 27–34)
MCHC RBC-ENTMCNC: 31.4 % — LOW (ref 32–36)
MCV RBC AUTO: 93.6 FL — SIGNIFICANT CHANGE UP (ref 80–100)
NRBC # FLD: 0 — SIGNIFICANT CHANGE UP
OB PNL STL: POSITIVE — SIGNIFICANT CHANGE UP
PLATELET # BLD AUTO: 191 K/UL — SIGNIFICANT CHANGE UP (ref 150–400)
PMV BLD: 9.3 FL — SIGNIFICANT CHANGE UP (ref 7–13)
POTASSIUM SERPL-MCNC: 4 MMOL/L — SIGNIFICANT CHANGE UP (ref 3.5–5.3)
POTASSIUM SERPL-SCNC: 4 MMOL/L — SIGNIFICANT CHANGE UP (ref 3.5–5.3)
RBC # BLD: 3.27 M/UL — LOW (ref 4.2–5.8)
RBC # FLD: 15.2 % — HIGH (ref 10.3–14.5)
SODIUM SERPL-SCNC: 139 MMOL/L — SIGNIFICANT CHANGE UP (ref 135–145)
SPECIMEN SOURCE: SIGNIFICANT CHANGE UP
VANCOMYCIN TROUGH SERPL-MCNC: 20.2 UG/ML — HIGH (ref 10–20)
WBC # BLD: 11.26 K/UL — HIGH (ref 3.8–10.5)
WBC # FLD AUTO: 11.26 K/UL — HIGH (ref 3.8–10.5)

## 2017-09-25 PROCEDURE — 99232 SBSQ HOSP IP/OBS MODERATE 35: CPT

## 2017-09-25 PROCEDURE — 71010: CPT | Mod: 26

## 2017-09-25 PROCEDURE — 71010: CPT | Mod: 26,77

## 2017-09-25 RX ORDER — ALPRAZOLAM 0.25 MG
0.5 TABLET ORAL
Qty: 0 | Refills: 0 | Status: DISCONTINUED | OUTPATIENT
Start: 2017-09-25 | End: 2017-09-26

## 2017-09-25 RX ORDER — VANCOMYCIN HCL 1 G
1000 VIAL (EA) INTRAVENOUS EVERY 24 HOURS
Qty: 0 | Refills: 0 | Status: DISCONTINUED | OUTPATIENT
Start: 2017-09-26 | End: 2017-09-28

## 2017-09-25 RX ORDER — LANOLIN ALCOHOL/MO/W.PET/CERES
3 CREAM (GRAM) TOPICAL AT BEDTIME
Qty: 0 | Refills: 0 | Status: DISCONTINUED | OUTPATIENT
Start: 2017-09-25 | End: 2017-10-04

## 2017-09-25 RX ORDER — NYSTATIN CREAM 100000 [USP'U]/G
1 CREAM TOPICAL
Qty: 0 | Refills: 0 | Status: COMPLETED | OUTPATIENT
Start: 2017-09-25 | End: 2017-09-25

## 2017-09-25 RX ADMIN — SODIUM CHLORIDE 4 MILLILITER(S): 9 INJECTION INTRAMUSCULAR; INTRAVENOUS; SUBCUTANEOUS at 15:43

## 2017-09-25 RX ADMIN — DORNASE ALFA 2.5 MILLIGRAM(S): 1 SOLUTION RESPIRATORY (INHALATION) at 21:40

## 2017-09-25 RX ADMIN — CHLORHEXIDINE GLUCONATE 1 APPLICATION(S): 213 SOLUTION TOPICAL at 05:16

## 2017-09-25 RX ADMIN — SODIUM CHLORIDE 4 MILLILITER(S): 9 INJECTION INTRAMUSCULAR; INTRAVENOUS; SUBCUTANEOUS at 21:50

## 2017-09-25 RX ADMIN — Medication 100 MILLIGRAM(S): at 22:26

## 2017-09-25 RX ADMIN — Medication 3 MILLILITER(S): at 21:30

## 2017-09-25 RX ADMIN — NYSTATIN CREAM 1 APPLICATION(S): 100000 CREAM TOPICAL at 17:19

## 2017-09-25 RX ADMIN — SODIUM CHLORIDE 10 MILLILITER(S): 9 INJECTION INTRAMUSCULAR; INTRAVENOUS; SUBCUTANEOUS at 11:08

## 2017-09-25 RX ADMIN — Medication 100 MILLIGRAM(S): at 13:33

## 2017-09-25 RX ADMIN — Medication 100 MILLIGRAM(S): at 06:57

## 2017-09-25 RX ADMIN — Medication 100 MILLIGRAM(S): at 11:43

## 2017-09-25 RX ADMIN — SODIUM CHLORIDE 4 MILLILITER(S): 9 INJECTION INTRAMUSCULAR; INTRAVENOUS; SUBCUTANEOUS at 10:39

## 2017-09-25 RX ADMIN — FOSPHENYTOIN 108 MILLIGRAM(S) PE: 50 INJECTION INTRAMUSCULAR; INTRAVENOUS at 07:36

## 2017-09-25 RX ADMIN — Medication 3 MILLIGRAM(S): at 00:44

## 2017-09-25 RX ADMIN — DORNASE ALFA 2.5 MILLIGRAM(S): 1 SOLUTION RESPIRATORY (INHALATION) at 10:35

## 2017-09-25 RX ADMIN — PREGABALIN 500 MICROGRAM(S): 225 CAPSULE ORAL at 11:43

## 2017-09-25 RX ADMIN — Medication 3 MILLILITER(S): at 10:35

## 2017-09-25 RX ADMIN — FOSPHENYTOIN 108 MILLIGRAM(S) PE: 50 INJECTION INTRAMUSCULAR; INTRAVENOUS at 17:20

## 2017-09-25 RX ADMIN — Medication 1 MILLIGRAM(S): at 11:43

## 2017-09-25 RX ADMIN — CEFEPIME 100 MILLIGRAM(S): 1 INJECTION, POWDER, FOR SOLUTION INTRAMUSCULAR; INTRAVENOUS at 05:16

## 2017-09-25 RX ADMIN — Medication 0.5 MILLIGRAM(S): at 14:54

## 2017-09-25 RX ADMIN — Medication 1 PUFF(S): at 10:35

## 2017-09-25 RX ADMIN — MICAFUNGIN SODIUM 105 MILLIGRAM(S): 100 INJECTION, POWDER, LYOPHILIZED, FOR SOLUTION INTRAVENOUS at 13:33

## 2017-09-25 RX ADMIN — Medication 20 MILLIGRAM(S): at 05:15

## 2017-09-25 RX ADMIN — Medication 300 MILLIGRAM(S): at 11:48

## 2017-09-25 RX ADMIN — Medication 3 MILLILITER(S): at 15:44

## 2017-09-25 RX ADMIN — PANTOPRAZOLE SODIUM 40 MILLIGRAM(S): 20 TABLET, DELAYED RELEASE ORAL at 05:15

## 2017-09-25 RX ADMIN — PANTOPRAZOLE SODIUM 40 MILLIGRAM(S): 20 TABLET, DELAYED RELEASE ORAL at 17:18

## 2017-09-25 RX ADMIN — Medication 20 MILLIGRAM(S): at 17:19

## 2017-09-25 RX ADMIN — Medication 1 PUFF(S): at 21:52

## 2017-09-25 RX ADMIN — CEFEPIME 100 MILLIGRAM(S): 1 INJECTION, POWDER, FOR SOLUTION INTRAMUSCULAR; INTRAVENOUS at 17:19

## 2017-09-25 NOTE — PROGRESS NOTE ADULT - SUBJECTIVE AND OBJECTIVE BOX
Follow-up Pulm Progress Note    Walking with walker with PT. On NC. drains are out.     Medications:  MEDICATIONS  (STANDING):  cefepime  IVPB 1000 milliGRAM(s) IV Intermittent every 12 hours  metroNIDAZOLE  IVPB      metroNIDAZOLE  IVPB 500 milliGRAM(s) IV Intermittent every 8 hours  chlorhexidine 4% Liquid 1 Application(s) Topical daily  fosphenytoin IVPB 200 milliGRAM(s) PE IV Intermittent every 12 hours  folic acid Injectable 1 milliGRAM(s) IV Push daily  pyridoxine Injectable 100 milliGRAM(s) IV Push daily  cyanocobalamin Injectable 500 MICROGram(s) SubCutaneous daily  pantoprazole  Injectable 40 milliGRAM(s) IV Push two times a day  methylPREDNISolone sodium succinate Injectable 20 milliGRAM(s) IV Push two times a day  micafungin IVPB 100 milliGRAM(s) IV Intermittent every 24 hours  micafungin IVPB      fluticasone propionate   220 MICROgram(s) HFA Inhaler 1 Puff(s) Inhalation two times a day  sodium chloride 0.9%. 1000 milliLiter(s) (10 mL/Hr) IV Continuous <Continuous>  aspirin Suppository 300 milliGRAM(s) Rectal daily  phenylephrine    Infusion 0.8 MICROgram(s)/kG/Min (21.66 mL/Hr) IV Continuous <Continuous>  ALBUTerol/ipratropium for Nebulization 3 milliLiter(s) Nebulizer every 6 hours  dornase ziyad Solution 2.5 milliGRAM(s) Inhalation two times a day  sodium chloride 3%  Inhalation 4 milliLiter(s) Inhalation every 6 hours  ALPRAZolam 0.5 milliGRAM(s) Oral two times a day    MEDICATIONS  (PRN):  guaiFENesin    Syrup 200 milliGRAM(s) Oral every 6 hours PRN Cough  HYDROcodone/homatropine Syrup 5 milliLiter(s) Oral every 4 hours PRN Cough  melatonin 3 milliGRAM(s) Oral at bedtime PRN Insomnia    Vital Signs Last 24 Hrs  T(C): 36.8 (25 Sep 2017 16:00), Max: 36.8 (25 Sep 2017 00:00)  T(F): 98.2 (25 Sep 2017 16:00), Max: 98.2 (25 Sep 2017 00:00)  HR: 87 (25 Sep 2017 16:00) (84 - 96)  BP: 122/62 (25 Sep 2017 16:00) (90/53 - 135/65)  BP(mean): 77 (25 Sep 2017 16:00) (56 - 107)  RR: 18 (25 Sep 2017 16:00) (17 - 25)  SpO2: 100% (25 Sep 2017 16:00) (95% - 100%)          09-24 @ 07:01  -  09-25 @ 07:00  --------------------------------------------------------  IN: 2908.3 mL / OUT: 2155 mL / NET: 753.3 mL          LABS:                        9.6    11.26 )-----------( 191      ( 25 Sep 2017 04:45 )             30.6     09-25    139  |  107  |  18  ----------------------------<  115<H>  4.0   |  26  |  0.53    Ca    8.5      25 Sep 2017 04:45  Phos  2.1     09-24  Mg     1.7     09-24    CAPILLARY BLOOD GLUCOSE  110 (25 Sep 2017 12:00)    Fluid characteristics  -- 09-17 @ 08:09  pH >8    tprot 1.9    Cell count  Appearance Cloudy  Fluid type --  BF lymph 9  color Yellow  eosinophil 2  PMN 55  Mesothelial --  Monocyte 34  Other body cells --  Fluid characteristics  -- 09-15 @ 20:21  pH --    tprot 3.3    Cell count  Appearance --  Fluid type --  BF lymph --  color --  eosinophil --  PMN --  Mesothelial --  Monocyte --  Other body cells --      CULTURES: (if applicable)  C Diff by PCR Result: NotDetec (09-20 @ 07:34)          Physical Examination:  PULM: decreased BS at bases  CVS: S1, S2 heard    RADIOLOGY REVIEWED  CXR:     CT chest:    TTE:

## 2017-09-25 NOTE — PROGRESS NOTE ADULT - SUBJECTIVE AND OBJECTIVE BOX
ACS Progress Note    S: Patient seen and examined. No acute events overnight. Pain well controlled with current regimen.   Denies nausea/vomiting.     O:  Vital Signs Last 24 Hrs  T(C): 36.8 (25 Sep 2017 12:00), Max: 36.9 (24 Sep 2017 16:00)  T(F): 98.2 (25 Sep 2017 12:00), Max: 98.4 (24 Sep 2017 16:00)  HR: 90 (25 Sep 2017 14:00) (84 - 96)  BP: 124/68 (25 Sep 2017 14:00) (90/53 - 135/65)  BP(mean): 81 (25 Sep 2017 14:00) (56 - 107)  RR: 20 (25 Sep 2017 14:00) (16 - 24)  SpO2: 100% (25 Sep 2017 14:00) (95% - 100%)    I&O's Detail    24 Sep 2017 07:01  -  25 Sep 2017 07:00  --------------------------------------------------------  IN:    Enteral Tube Flush: 200 mL    IV PiggyBack: 950 mL    Jevity: 1440 mL    phenylephrine   Infusion: 78.3 mL    sodium chloride 0.9%.: 240 mL  Total IN: 2908.3 mL    OUT:    Indwelling Catheter - Urethral: 2155 mL  Total OUT: 2155 mL    Total NET: 753.3 mL      25 Sep 2017 07:01  -  25 Sep 2017 15:07  --------------------------------------------------------  IN:    Enteral Tube Flush: 40 mL    IV PiggyBack: 200 mL    Jevity: 420 mL    sodium chloride 0.9%.: 70 mL  Total IN: 730 mL    OUT:    Drain: 5 mL    Indwelling Catheter - Urethral: 500 mL  Total OUT: 505 mL    Total NET: 225 mL          MEDICATIONS  (STANDING):  cefepime  IVPB 1000 milliGRAM(s) IV Intermittent every 12 hours  vancomycin  IVPB 750 milliGRAM(s) IV Intermittent every 12 hours  metroNIDAZOLE  IVPB      metroNIDAZOLE  IVPB 500 milliGRAM(s) IV Intermittent every 8 hours  chlorhexidine 4% Liquid 1 Application(s) Topical daily  fosphenytoin IVPB 200 milliGRAM(s) PE IV Intermittent every 12 hours  folic acid Injectable 1 milliGRAM(s) IV Push daily  pyridoxine Injectable 100 milliGRAM(s) IV Push daily  cyanocobalamin Injectable 500 MICROGram(s) SubCutaneous daily  pantoprazole  Injectable 40 milliGRAM(s) IV Push two times a day  methylPREDNISolone sodium succinate Injectable 20 milliGRAM(s) IV Push two times a day  micafungin IVPB 100 milliGRAM(s) IV Intermittent every 24 hours  micafungin IVPB      fluticasone propionate   220 MICROgram(s) HFA Inhaler 1 Puff(s) Inhalation two times a day  sodium chloride 0.9%. 1000 milliLiter(s) (10 mL/Hr) IV Continuous <Continuous>  aspirin Suppository 300 milliGRAM(s) Rectal daily  phenylephrine    Infusion 0.8 MICROgram(s)/kG/Min (21.66 mL/Hr) IV Continuous <Continuous>  ALBUTerol/ipratropium for Nebulization 3 milliLiter(s) Nebulizer every 6 hours  dornase ziyad Solution 2.5 milliGRAM(s) Inhalation two times a day  sodium chloride 3%  Inhalation 4 milliLiter(s) Inhalation every 6 hours  nystatin Powder 1 Application(s) Topical two times a day  ALPRAZolam 0.5 milliGRAM(s) Oral two times a day    MEDICATIONS  (PRN):  guaiFENesin    Syrup 200 milliGRAM(s) Oral every 6 hours PRN Cough  HYDROcodone/homatropine Syrup 5 milliLiter(s) Oral every 4 hours PRN Cough  melatonin 3 milliGRAM(s) Oral at bedtime PRN Insomnia                            9.6    11.26 )-----------( 191      ( 25 Sep 2017 04:45 )             30.6       09-25    139  |  107  |  18  ----------------------------<  115<H>  4.0   |  26  |  0.53    Ca    8.5      25 Sep 2017 04:45  Phos  2.1     09-24  Mg     1.7     09-24        Physical Exam:  Gen: Laying in bed, NAD, alert and oriented.   Resp: Unlabored breathing  Abd: soft, NTND. J-tube site c/d/i

## 2017-09-25 NOTE — PROGRESS NOTE ADULT - ASSESSMENT
82 M  RA-chronic steroids, copd/asthma, cad-recent stents, MI, SHANA esophagectomy. Had respiratory failure, L pleural effusion=exudate, R chest drain -not functioning, and esophageal leak-R. Now extubated.

## 2017-09-25 NOTE — PROGRESS NOTE ADULT - SUBJECTIVE AND OBJECTIVE BOX
f/u anastomotic leak and loculated R pleural effusion    interval history/ROS:  off the ventilator and talking.  Walked the hallway of CTICU.  R Chest pigtail.  C/o diarrhea.  Cdiff PCR was negative.  ROS otherwise negative.     Allergies  penicillin (Rash)    ANTIMICROBIALS:    cefepime  IVPB 1000 every 12 hours - started 9/15-  vancomycin  IVPB 750 every 12 hours - started 9/15-  vancomycin    Solution 125 every 6 hours - started   metroNIDAZOLE  IVPB 500 every 8 hours - started 9/15-  micafungin IVPB - started 9/21    MEDICATIONS  (STANDING):  fosphenytoin IVPB 200 every 12 hours  pantoprazole  Injectable 40 two times a day  methylPREDNISolone sodium succinate Injectable 20 two times a day  fluticasone propionate   220 MICROgram(s) HFA Inhaler 1 two times a day  aspirin Suppository 300 daily  phenylephrine    Infusion 0.8 <Continuous>  ALBUTerol/ipratropium for Nebulization 3 every 6 hours  dornase zyiad Solution 2.5 two times a day  sodium chloride 3%  Inhalation 4 every 6 hours    Vital Signs Last 24 Hrs  T(F): 98.1 (09-25-17 @ 08:00), Max: 98.4 (09-24-17 @ 16:00)  HR: 84 (09-25-17 @ 10:39)  BP: 132/62 (09-25-17 @ 10:00)  RR: 19 (09-25-17 @ 10:00)  SpO2: 100% (09-25-17 @ 10:00) (95% - 100%)  Wt(kg): --    PHYSICAL EXAM:  General: non-toxic; sitting in chair  HEAD/EYES:  no icterus  ENT:  supple  Cardiovascular:   S1, S2; edema  Respiratory:  R chest pigtail catheter  Abdomen: non tender; normal bowel sounds  :  garza  Musculoskeletal:  no synovitis  Neurologic:  non-focal  Skin:  no rash  Lymph: no lymphadenopathy  Psychiatric:  normal affect  Vascular:  no phlebitis                  9.6    11.26 )-----------( 191      ( 25 Sep 2017 04:45 )             30.6 09-25    139  |  107  |  18  ----------------------------<  115  4.0   |  26  |  0.53  Ca    8.5      25 Sep 2017 04:45Phos  2.1     09-24Mg     1.7     09-24    Vancomycin Level, Random: 12.7 ug/mL (09-21-17 @ 04:55)    MICROBIOLOGY:  Culture - Body Fluid with Gram Stain (09.21.17 @ 21:18)    Gram Stain:  WBC^White Blood Cells QNTY CELLS IN GRAM STAIN: NO CELLS SEEN   NOS^No Organisms Seen    Specimen Source: PLEURAL FLUID    Culture - Blood (09.21.17 @ 02:01)  Staphylococcus epidermidis    Culture - Blood (09.21.17 @ 02:01)  STEP^Staphylococcus epidermidis    C Diff by PCR Result: NotDetec (09.20.17 @ 07:34)    Culture - Body Fluid with Gram Stain (09.17.17 @ 12:42)    Specimen Source: Pleural Fl Other, Pleural Fluid    Culture Results:   No growth to date.    Specimen Source: .Blood Blood (09.18.17 @ 08:34)    Culture - Body Fluid with Gram Stain (09.17.17 @ 12:42)  Pleural Fl Other, Pleural Fluid  No growth to date.    Specimen Source: .Blood Blood-Venous (09.16.17 @ 15:30)  Culture Results:   No growth at 5 days. (09.16.17 @ 15:30)    Culture - Body Fluid with Gram Stain (09.16.17 @ 00:58)  .Body Fluid Pleural Fluid    Culture Results:   No growth at 5 days    Specimen Source: .Blood Blood-Venous (09.15.17 @ 07:47)  Negative    Culture - Respiratory with Gram Stain (08.20.17 @ 16:47)    Gram Stain Sputum:     Specimen Source: SPUTUM    Organism Identification: Stenotrophomonas maltophilia    Organism: Stenotrophomonas maltophilia    RADIOLOGY:  CT Abdomen and Pelvis w/ IV Cont (09.21.17 @ 12:50)   IMPRESSION:   Status post esophagectomy and gastric pull-through with adjacent extraluminal contrast consistent with known leak.  Tracheostomy tube in place.  Moderate sized pleural effusions with loculations.  Evidence of pericarditis with small to moderate-sized pericardial effusion.  J-tube in place.  Small volume ascites. Anasarca.    Xray Esophagram (09.19.17 @ 12:08)   IMPRESSION:  Status post [place]. Findings as described above consistent with a leak at the distal site just above the hemidiaphragm into the pleural space.       Transthoracic Echocardiogram (09.18.17 @ 10:15) >  CONCLUSIONS:  1. Mitral annular calcification and calcified mitral leaflets with normal diastolic opening.  2. Calcified aortic valve with decreased opening.  3. Low Normal left ventricular systolic function. EF 50%  4. The right ventricle is not well visualized; grossly normal right ventricular systolic function.  5. Thickened  pericardium with small  0.9cm circumferential pericardial effusion. There is evidence of raised intra pericardial pressure with RA buckling however no  tamponade seen.

## 2017-09-25 NOTE — PROGRESS NOTE ADULT - PROBLEM SELECTOR PLAN 2
s/p R chest tube placement  f/u all cx  management as per cticu  abx as per ID on vanco flagyl cefepime
s/p R chest tube placement  f/u all cx  management as per cticu  abx as per ID

## 2017-09-25 NOTE — PROGRESS NOTE ADULT - ASSESSMENT
ASSESSMENT:  82M esophageal ca s/p chemo/RT followed by [place] esophagogastrectomy with feeding J tube. Complicated by leak s/p IV abx    PLAN:    - J-tube feeds   - Monitor respiratory status  - Wean off pressors as tolerated   - Care per CTICU

## 2017-09-25 NOTE — PROGRESS NOTE ADULT - SUBJECTIVE AND OBJECTIVE BOX
MEJIA LATIF  MRN-[id number]    HPI:  82 M presents with non-functioning J tube and SOB. Patient recently underwent a robotic-assisted [place]Trent esophagogastrectomy with feeding J tube placement for esophageal adenocarcinoma (T1aN0) with Dr. Dyer and Dr. Christian Weinstein on 8/14/17. After surgery, patient was found to have an anastomotic leak and was treated conservatively with NPO, antibiotics, chest tube drainage, and J tube feeding. Patient was eventually discharged to rehab. On day of evaluation, patient's J tube was unable to be used, and was sent to the ED for evaluation. Patient also complains of SOB, but no chest pain or abdominal pain. Denies fever or chills. Patient was taking medications by mouth, but was only getting nutrition via his J tube. Esophagram done on 9/19/17 showing  a leak at the distal site just above the hemidiaphragm into the pleural space.   Patient was found to have a Left pleural effusion. Patent received pigtail and developed respiratory distress. Patient was transferred to the SICU where he was intubated. Patient also with right GINA drain.  Patient is now being transferred to [facility]ICU for optimal management. (20 Sep 2017 23:45)      Procedure: [place] Trent esophagectomy    POD # : 42    Issues:              Distal esophagal leak  Respiratory failure  Hypotension  Pleural effusion  Esophageal leak  Seizure disorder  NSTEMI      Interval/Overnight Events:  Pt remained hemodynamically stable overnight, not on any pressors or inotropes. OOB to chair, breathing comfortably with minimal pain. Ambulated several times         PAST MEDICAL & SURGICAL HISTORY:  Coronary artery disease: s/p 3 stents on June 30, 2017 at [place] Dr. Lavelle Reid  OA (osteoarthritis) of knee: right  Former smoker, stopped smoking in distant past  Rheumatoid arthritis  Seizure disorder: 1 episode approximately 15 yrs ago  Asthma  Hyperlipidemia  BPH (benign prostatic hyperplasia)  HTN (hypertension)  Esophagus cancer  Chronic allergic rhinitis  BCC (basal cell carcinoma): skin  Cerebrovascular accident (CVA)  Anxiety  Anastomotic leak following esophagectomy: [place] trent esophagectomy  History of tonsillectomy  H/O heart artery stent: June 30, 2017  Knee arthropathy: left  History of total hip replacement: left  History of hernia repair      ***VITAL SIGNS:  Vital Signs Last 24 Hrs  T(C): 36.8 (25 Sep 2017 16:00), Max: 36.8 (25 Sep 2017 00:00)  T(F): 98.2 (25 Sep 2017 16:00), Max: 98.2 (25 Sep 2017 00:00)  HR: 94 (25 Sep 2017 19:00) (84 - 97)  BP: 124/68 (25 Sep 2017 19:00) (90/53 - 135/65)  BP(mean): 78 (25 Sep 2017 19:00) (56 - 107)  RR: 23 (25 Sep 2017 19:00) (17 - 25)  SpO2: 100% (25 Sep 2017 19:00) (95% - 100%)  I/Os:   I&O's Detail    24 Sep 2017 07:01  -  25 Sep 2017 07:00  --------------------------------------------------------  IN:    Enteral Tube Flush: 200 mL    IV PiggyBack: 950 mL    Jevity: 1440 mL    phenylephrine   Infusion: 78.3 mL    sodium chloride 0.9%.: 240 mL  Total IN: 2908.3 mL    OUT:    Indwelling Catheter - Urethral: 2155 mL  Total OUT: 2155 mL    Total NET: 753.3 mL      25 Sep 2017 07:01  -  25 Sep 2017 20:36  --------------------------------------------------------  IN:    Enteral Tube Flush: 60 mL    IV PiggyBack: 300 mL    Jevity: 720 mL    sodium chloride 0.9%.: 120 mL  Total IN: 1200 mL    OUT:    Drain: 5 mL    Indwelling Catheter - Urethral: 1000 mL  Total OUT: 1005 mL    Total NET: 195 mL        CAPILLARY BLOOD GLUCOSE  99 (25 Sep 2017 18:00)  110 (25 Sep 2017 12:00)  110 (25 Sep 2017 07:00)    ======================= PATIENT CARE ACCESS DEVICES ===================  Peripheral IV  ======================= PHYSICAL EXAM============================  General:                         Awake, alert, not in any distress  Neuro:                            Moving all extremities to commands. No acute change from baseline exam.	  Respiratory:	Lungs clear to auscultation bilaterally. Good aeration.                                           No rales, rhonchi. Effort even and unlabored.  CV:		Regular rate and rhythm. Normal S1/S2. No murmurs                                          Distal pulses present.  Abdomen:	                     Soft, non-distended. Bowel sounds present, loss stoolsx4.    Skin:		No rash.  Extremities:	Warm, no cyanosis or edema.  Palpable pulses    ============================ LABS =========================                        9.6    11.26 )-----------( 191      ( 25 Sep 2017 04:45 )             30.6     09-25    139  |  107  |  18  ----------------------------<  115<H>  4.0   |  26  |  0.53    Ca    8.5      25 Sep 2017 04:45  Phos  2.1     09-24  Mg     1.7     09-24      ===================== IMAGING STUDIES =========================     Portable upright view of the chest dated 9/25/2017 is compared   to 9/24/2017. Left costophrenic angle is excluded. Mediastinal drain and   left IJ line are stable. Left chest tube is also unchanged. Small left   pleural effusion is stable. Bibasilar subsegmental atelectasis is also   unchanged. There is no pneumothorax. Cardiomediastinal silhouette cannot   be evaluated.  =====================Cardiac tests======================      =======================  MEDICATIONS  =================  MEDICATIONS  (STANDING):  cefepime  IVPB 1000 milliGRAM(s) IV Intermittent every 12 hours  metroNIDAZOLE  IVPB      metroNIDAZOLE  IVPB 500 milliGRAM(s) IV Intermittent every 8 hours  chlorhexidine 4% Liquid 1 Application(s) Topical daily  fosphenytoin IVPB 200 milliGRAM(s) PE IV Intermittent every 12 hours  folic acid Injectable 1 milliGRAM(s) IV Push daily  pyridoxine Injectable 100 milliGRAM(s) IV Push daily  cyanocobalamin Injectable 500 MICROGram(s) SubCutaneous daily  pantoprazole  Injectable 40 milliGRAM(s) IV Push two times a day  methylPREDNISolone sodium succinate Injectable 20 milliGRAM(s) IV Push two times a day  micafungin IVPB 100 milliGRAM(s) IV Intermittent every 24 hours  micafungin IVPB      fluticasone propionate   220 MICROgram(s) HFA Inhaler 1 Puff(s) Inhalation two times a day  sodium chloride 0.9%. 1000 milliLiter(s) (10 mL/Hr) IV Continuous <Continuous>  aspirin Suppository 300 milliGRAM(s) Rectal daily  phenylephrine    Infusion 0.8 MICROgram(s)/kG/Min (21.66 mL/Hr) IV Continuous <Continuous>  ALBUTerol/ipratropium for Nebulization 3 milliLiter(s) Nebulizer every 6 hours  dornase ziyad Solution 2.5 milliGRAM(s) Inhalation two times a day  sodium chloride 3%  Inhalation 4 milliLiter(s) Inhalation every 6 hours  ALPRAZolam 0.5 milliGRAM(s) Oral two times a day    MEDICATIONS  (PRN):  guaiFENesin    Syrup 200 milliGRAM(s) Oral every 6 hours PRN Cough  HYDROcodone/homatropine Syrup 5 milliLiter(s) Oral every 4 hours PRN Cough  melatonin 3 milliGRAM(s) Oral at bedtime PRN Insomnia      ====================== NEUROLOGY=====================  Pain control with Tylenol IV , prn  Fentanyl    ==================== RESPIRATORY======================  Pt is on  2  L nasal canula   Comfortable, not in any distress.  Using incentive spirometry mONITOR alix BULP DRAIN	  Continue bronchodilators, pulmonary toilet, CXR with RLL atelectasis will bronch if not improving after aggressive chest PT    ====================CARDIOVASCULAR==================  Continue hemodynamic monitoring.  Not on any pressors.       ===================== RENAL =========================  Continue IVF  Monitor I/Os and electrolytes   Keep López  ==================== GASTROINTESTINAL===================  Continue GI prophylaxis with Pepcid / Protonix  Continue Zofran / Reglan for nausea - PRN	  cont enteral tube feeds at goal,    =======================    ENDOCRIN  =====================  Glycemic monitoring  F/S with coverage  ===================HEMATOLOGIC/ONC ===================  Monitor GINA output. No signs of active bleeding.   Follow CBC in AM  DVT prophylaxis with SCD, sc Heparin    ========================INFECTIOUS DISEASE================  D./W ID, Cont all iv abxs,  Monitor for fever / leukocytosis.  All surgical incision / chest tube  sites look clean        Pertinent clinical, laboratory, radiographic, hemodynamic,  respiratory data, microbiologic data and chart were reviewed and analyzed frequently throughout the course of the day and night. GI and DVT prophylaxis, glycemic control, head of bed elevation and skin care issues were addressed.  Patient seen, examined and plan discussed with CT Surgery / CTICU team during rounds.      Nhi Shepard DO, FACEP

## 2017-09-25 NOTE — PROGRESS NOTE ADULT - ASSESSMENT
82M CAD/stent, OA, RA on steroids, seizures, chol, asthma, htn, CVA, esophageal ca s/p chemo/RT followed by Vance-Trent esophagogastrectomy 8/14/17 with feeding J tube. 8/21/17, diagnosed with anastomotic leak s/p IV antibiotics (8/20/17 - 8/23/17), NPO - J-tube feeds.  Here with SOB likely due to persistent leak and b/l effusions.  On empiric antimicrobials (since 9/15-) including antifungal therapy since (9/20-). CT with loculated R pleural effusion, L pleural effusion and pericardial effusion/pericarditis.  All pleural fluid with negative cultures.  9/21 s/p EGD:   Perforation in esophagus at approximately 28cm from incisors; Perforation in stomach at approximately 40cm from incisors.  Also, several BC with CNS - the last 2 sets with CNS but were drawn from central line (after it was newly placed).  I think the CNS in blood are contaminants.      Per Thoracic - conservative management for now - may or may not need intervention but he is also s/p NSTEMI  Empiric vanc/cefepime/flagyl/micafungin  Treatment is for possible empyema in light of esophageal anastomotic leak into pleural space  4 weeks antibiotics from 9/15  check vancomycin trough    I have discussed plan of care with consulting team (CTICU intensivist)

## 2017-09-25 NOTE — PROGRESS NOTE ADULT - SUBJECTIVE AND OBJECTIVE BOX
MEJIA LATIF:[id number],   82yMale followed for:  penicillin (Rash)    PAST MEDICAL & SURGICAL HISTORY:  Coronary artery disease: s/p 3 stents on June 30, 2017 at [place] Dr. Lavelle Reid  OA (osteoarthritis) of knee: right  Former smoker, stopped smoking in distant past  Rheumatoid arthritis  Seizure disorder: 1 episode approximately 15 yrs ago  Asthma  Hyperlipidemia  BPH (benign prostatic hyperplasia)  HTN (hypertension)  Esophagus cancer  Chronic allergic rhinitis  BCC (basal cell carcinoma): skin  Cerebrovascular accident (CVA)  Anxiety  Anastomotic leak following esophagectomy: [place] esophagectomy  History of tonsillectomy  H/O heart artery stent: June 30, 2017  Knee arthropathy: left  History of total hip replacement: left  History of hernia repair    FAMILY HISTORY:  Family history of heart attack (Sibling)  Family history of pulmonary embolism: father @ 49 yr old  FH: CAD (coronary artery disease)    MEDICATIONS  (STANDING):  cefepime  IVPB 1000 milliGRAM(s) IV Intermittent every 12 hours  metroNIDAZOLE  IVPB      metroNIDAZOLE  IVPB 500 milliGRAM(s) IV Intermittent every 8 hours  chlorhexidine 4% Liquid 1 Application(s) Topical daily  fosphenytoin IVPB 200 milliGRAM(s) PE IV Intermittent every 12 hours  folic acid Injectable 1 milliGRAM(s) IV Push daily  pyridoxine Injectable 100 milliGRAM(s) IV Push daily  cyanocobalamin Injectable 500 MICROGram(s) SubCutaneous daily  pantoprazole  Injectable 40 milliGRAM(s) IV Push two times a day  methylPREDNISolone sodium succinate Injectable 20 milliGRAM(s) IV Push two times a day  micafungin IVPB 100 milliGRAM(s) IV Intermittent every 24 hours  micafungin IVPB      fluticasone propionate   220 MICROgram(s) HFA Inhaler 1 Puff(s) Inhalation two times a day  sodium chloride 0.9%. 1000 milliLiter(s) (10 mL/Hr) IV Continuous <Continuous>  aspirin Suppository 300 milliGRAM(s) Rectal daily  phenylephrine    Infusion 0.8 MICROgram(s)/kG/Min (21.66 mL/Hr) IV Continuous <Continuous>  ALBUTerol/ipratropium for Nebulization 3 milliLiter(s) Nebulizer every 6 hours  dornase ziyad Solution 2.5 milliGRAM(s) Inhalation two times a day  sodium chloride 3%  Inhalation 4 milliLiter(s) Inhalation every 6 hours  nystatin Powder 1 Application(s) Topical two times a day  ALPRAZolam 0.5 milliGRAM(s) Oral two times a day    MEDICATIONS  (PRN):  guaiFENesin    Syrup 200 milliGRAM(s) Oral every 6 hours PRN Cough  HYDROcodone/homatropine Syrup 5 milliLiter(s) Oral every 4 hours PRN Cough  melatonin 3 milliGRAM(s) Oral at bedtime PRN Insomnia      Vital Signs Last 24 Hrs  T(C): 36.8 (25 Sep 2017 16:00), Max: 36.8 (25 Sep 2017 00:00)  T(F): 98.2 (25 Sep 2017 16:00), Max: 98.2 (25 Sep 2017 00:00)  HR: 87 (25 Sep 2017 16:00) (84 - 96)  BP: 122/62 (25 Sep 2017 16:00) (90/53 - 135/65)  BP(mean): 77 (25 Sep 2017 16:00) (56 - 107)  RR: 18 (25 Sep 2017 16:00) (17 - 25)  SpO2: 100% (25 Sep 2017 16:00) (95% - 100%)  nc/at  s1s2  cta  soft, nt, nd no guarding or rebound  no c/c/e    CBC Full  -  ( 25 Sep 2017 04:45 )  WBC Count : 11.26 K/uL  Hemoglobin : 9.6 g/dL  Hematocrit : 30.6 %  Platelet Count - Automated : 191 K/uL  Mean Cell Volume : 93.6 fL  Mean Cell Hemoglobin : 29.4 pg  Mean Cell Hemoglobin Concentration : 31.4 %  Auto Neutrophil # : x  Auto Lymphocyte # : x  Auto Monocyte # : x  Auto Eosinophil # : x  Auto Basophil # : x  Auto Neutrophil % : x  Auto Lymphocyte % : x  Auto Monocyte % : x  Auto Eosinophil % : x  Auto Basophil % : x    09-25    139  |  107  |  18  ----------------------------<  115<H>  4.0   |  26  |  0.53    Ca    8.5      25 Sep 2017 04:45  Phos  2.1     09-24  Mg     1.7     09-24

## 2017-09-26 LAB
ALBUMIN SERPL ELPH-MCNC: 2.1 G/DL — LOW (ref 3.3–5)
ALP SERPL-CCNC: 130 U/L — HIGH (ref 40–120)
ALT FLD-CCNC: 42 U/L — HIGH (ref 4–41)
AST SERPL-CCNC: 31 U/L — SIGNIFICANT CHANGE UP (ref 4–40)
B2 GLYCOPROT1 AB SER QL: POSITIVE — SIGNIFICANT CHANGE UP
BASOPHILS # BLD AUTO: 0.04 K/UL — SIGNIFICANT CHANGE UP (ref 0–0.2)
BASOPHILS NFR BLD AUTO: 0.4 % — SIGNIFICANT CHANGE UP (ref 0–2)
BILIRUB SERPL-MCNC: 0.3 MG/DL — SIGNIFICANT CHANGE UP (ref 0.2–1.2)
BUN SERPL-MCNC: 17 MG/DL — SIGNIFICANT CHANGE UP (ref 7–23)
CALCIUM SERPL-MCNC: 8.5 MG/DL — SIGNIFICANT CHANGE UP (ref 8.4–10.5)
CHLORIDE SERPL-SCNC: 104 MMOL/L — SIGNIFICANT CHANGE UP (ref 98–107)
CO2 SERPL-SCNC: 23 MMOL/L — SIGNIFICANT CHANGE UP (ref 22–31)
CREAT SERPL-MCNC: 0.49 MG/DL — LOW (ref 0.5–1.3)
EOSINOPHIL # BLD AUTO: 0.27 K/UL — SIGNIFICANT CHANGE UP (ref 0–0.5)
EOSINOPHIL NFR BLD AUTO: 2.6 % — SIGNIFICANT CHANGE UP (ref 0–6)
GLUCOSE SERPL-MCNC: 111 MG/DL — HIGH (ref 70–99)
HCT VFR BLD CALC: 32 % — LOW (ref 39–50)
HGB BLD-MCNC: 9.8 G/DL — LOW (ref 13–17)
IMM GRANULOCYTES # BLD AUTO: 0.05 # — SIGNIFICANT CHANGE UP
IMM GRANULOCYTES NFR BLD AUTO: 0.5 % — SIGNIFICANT CHANGE UP (ref 0–1.5)
LYMPHOCYTES # BLD AUTO: 0.75 K/UL — LOW (ref 1–3.3)
LYMPHOCYTES # BLD AUTO: 7.3 % — LOW (ref 13–44)
MCHC RBC-ENTMCNC: 28.7 PG — SIGNIFICANT CHANGE UP (ref 27–34)
MCHC RBC-ENTMCNC: 30.6 % — LOW (ref 32–36)
MCV RBC AUTO: 93.6 FL — SIGNIFICANT CHANGE UP (ref 80–100)
MONOCYTES # BLD AUTO: 0.87 K/UL — SIGNIFICANT CHANGE UP (ref 0–0.9)
MONOCYTES NFR BLD AUTO: 8.5 % — SIGNIFICANT CHANGE UP (ref 2–14)
NEUTROPHILS # BLD AUTO: 8.24 K/UL — HIGH (ref 1.8–7.4)
NEUTROPHILS NFR BLD AUTO: 80.7 % — HIGH (ref 43–77)
NRBC # FLD: 0 — SIGNIFICANT CHANGE UP
PLATELET # BLD AUTO: 200 K/UL — SIGNIFICANT CHANGE UP (ref 150–400)
PMV BLD: 9.4 FL — SIGNIFICANT CHANGE UP (ref 7–13)
POTASSIUM SERPL-MCNC: 4.4 MMOL/L — SIGNIFICANT CHANGE UP (ref 3.5–5.3)
POTASSIUM SERPL-SCNC: 4.4 MMOL/L — SIGNIFICANT CHANGE UP (ref 3.5–5.3)
PROT SERPL-MCNC: 5.9 G/DL — LOW (ref 6–8.3)
RBC # BLD: 3.42 M/UL — LOW (ref 4.2–5.8)
RBC # FLD: 15.1 % — HIGH (ref 10.3–14.5)
SODIUM SERPL-SCNC: 139 MMOL/L — SIGNIFICANT CHANGE UP (ref 135–145)
VANCOMYCIN TROUGH SERPL-MCNC: 15.1 UG/ML — SIGNIFICANT CHANGE UP (ref 10–20)
WBC # BLD: 10.22 K/UL — SIGNIFICANT CHANGE UP (ref 3.8–10.5)
WBC # FLD AUTO: 10.22 K/UL — SIGNIFICANT CHANGE UP (ref 3.8–10.5)

## 2017-09-26 PROCEDURE — 99233 SBSQ HOSP IP/OBS HIGH 50: CPT

## 2017-09-26 PROCEDURE — 71010: CPT | Mod: 26

## 2017-09-26 PROCEDURE — 99232 SBSQ HOSP IP/OBS MODERATE 35: CPT | Mod: GC

## 2017-09-26 RX ORDER — FOSPHENYTOIN 50 MG/ML
200 INJECTION INTRAMUSCULAR; INTRAVENOUS EVERY 12 HOURS
Qty: 0 | Refills: 0 | Status: DISCONTINUED | OUTPATIENT
Start: 2017-09-26 | End: 2017-10-02

## 2017-09-26 RX ORDER — FUROSEMIDE 40 MG
10 TABLET ORAL ONCE
Qty: 0 | Refills: 0 | Status: COMPLETED | OUTPATIENT
Start: 2017-09-26 | End: 2017-09-26

## 2017-09-26 RX ADMIN — CEFEPIME 100 MILLIGRAM(S): 1 INJECTION, POWDER, FOR SOLUTION INTRAMUSCULAR; INTRAVENOUS at 06:37

## 2017-09-26 RX ADMIN — Medication 100 MILLIGRAM(S): at 13:05

## 2017-09-26 RX ADMIN — FOSPHENYTOIN 108 MILLIGRAM(S) PE: 50 INJECTION INTRAMUSCULAR; INTRAVENOUS at 06:19

## 2017-09-26 RX ADMIN — Medication 3 MILLILITER(S): at 22:12

## 2017-09-26 RX ADMIN — Medication 1 PUFF(S): at 21:52

## 2017-09-26 RX ADMIN — Medication 1 MILLIGRAM(S): at 13:05

## 2017-09-26 RX ADMIN — Medication 0.25 MILLIGRAM(S): at 18:03

## 2017-09-26 RX ADMIN — Medication 250 MILLIGRAM(S): at 04:36

## 2017-09-26 RX ADMIN — Medication 100 MILLIGRAM(S): at 06:05

## 2017-09-26 RX ADMIN — Medication 3 MILLILITER(S): at 16:22

## 2017-09-26 RX ADMIN — CEFEPIME 100 MILLIGRAM(S): 1 INJECTION, POWDER, FOR SOLUTION INTRAMUSCULAR; INTRAVENOUS at 17:45

## 2017-09-26 RX ADMIN — Medication 20 MILLIGRAM(S): at 06:37

## 2017-09-26 RX ADMIN — Medication 10 MILLIGRAM(S): at 18:30

## 2017-09-26 RX ADMIN — Medication 0.5 MILLIGRAM(S): at 06:37

## 2017-09-26 RX ADMIN — SODIUM CHLORIDE 4 MILLILITER(S): 9 INJECTION INTRAMUSCULAR; INTRAVENOUS; SUBCUTANEOUS at 16:22

## 2017-09-26 RX ADMIN — Medication 1 PUFF(S): at 10:06

## 2017-09-26 RX ADMIN — DORNASE ALFA 2.5 MILLIGRAM(S): 1 SOLUTION RESPIRATORY (INHALATION) at 21:54

## 2017-09-26 RX ADMIN — PANTOPRAZOLE SODIUM 40 MILLIGRAM(S): 20 TABLET, DELAYED RELEASE ORAL at 06:36

## 2017-09-26 RX ADMIN — Medication 20 MILLIGRAM(S): at 17:44

## 2017-09-26 RX ADMIN — MICAFUNGIN SODIUM 105 MILLIGRAM(S): 100 INJECTION, POWDER, LYOPHILIZED, FOR SOLUTION INTRAVENOUS at 13:05

## 2017-09-26 RX ADMIN — PREGABALIN 500 MICROGRAM(S): 225 CAPSULE ORAL at 13:05

## 2017-09-26 RX ADMIN — PANTOPRAZOLE SODIUM 40 MILLIGRAM(S): 20 TABLET, DELAYED RELEASE ORAL at 17:45

## 2017-09-26 RX ADMIN — DORNASE ALFA 2.5 MILLIGRAM(S): 1 SOLUTION RESPIRATORY (INHALATION) at 10:06

## 2017-09-26 RX ADMIN — Medication 100 MILLIGRAM(S): at 13:04

## 2017-09-26 RX ADMIN — SODIUM CHLORIDE 10 MILLILITER(S): 9 INJECTION INTRAMUSCULAR; INTRAVENOUS; SUBCUTANEOUS at 13:06

## 2017-09-26 RX ADMIN — SODIUM CHLORIDE 4 MILLILITER(S): 9 INJECTION INTRAMUSCULAR; INTRAVENOUS; SUBCUTANEOUS at 04:30

## 2017-09-26 RX ADMIN — CHLORHEXIDINE GLUCONATE 1 APPLICATION(S): 213 SOLUTION TOPICAL at 06:20

## 2017-09-26 RX ADMIN — FOSPHENYTOIN 108 MILLIGRAM(S) PE: 50 INJECTION INTRAMUSCULAR; INTRAVENOUS at 17:45

## 2017-09-26 RX ADMIN — Medication 300 MILLIGRAM(S): at 13:06

## 2017-09-26 RX ADMIN — Medication 3 MILLILITER(S): at 10:06

## 2017-09-26 RX ADMIN — SODIUM CHLORIDE 4 MILLILITER(S): 9 INJECTION INTRAMUSCULAR; INTRAVENOUS; SUBCUTANEOUS at 10:06

## 2017-09-26 RX ADMIN — Medication 3 MILLILITER(S): at 04:22

## 2017-09-26 RX ADMIN — Medication 100 MILLIGRAM(S): at 22:54

## 2017-09-26 NOTE — PROGRESS NOTE ADULT - ASSESSMENT
82M CAD/stent, OA, RA on steroids, seizures, chol, asthma, htn, CVA, esophageal ca s/p chemo/RT followed by Vance-Trent esophagogastrectomy 8/14/17 with feeding J tube. 8/21/17, diagnosed with anastomotic leak s/p IV antibiotics (8/20/17 - 8/23/17), NPO - J-tube feeds.  Here with SOB likely due to persistent leak and b/l effusions.  On empiric antimicrobials (since 9/15-) including antifungal therapy since (9/20-). CT with loculated R pleural effusion, L pleural effusion and pericardial effusion/pericarditis.  All pleural fluid with negative cultures.  9/21 s/p EGD:   Perforation in esophagus at approximately 28cm from incisors; Perforation in stomach at approximately 40cm from incisors.  Also, several BC with CNS - the last 2 sets with CNS but were drawn from central line (after it was newly placed). CNS in blood are likely contaminants.      Per Thoracic - conservative management for now - may or may not need intervention but he is also s/p NSTEMI  Empiric vanc/cefepime/flagyl/micafungin  Treatment is for possible empyema in light of esophageal anastomotic leak into pleural space  Vancomycin trough 09/26= 15  c/w 4 weeks antibiotics from 9/15

## 2017-09-26 NOTE — DIETITIAN INITIAL EVALUATION ADULT. - OTHER INFO
Nutrition assessment initiated for critical care LOS . Pt. alert, oriented , known from previous admission , was receiving EN support , noted pt. gained wt. from last admission  when discharged to rehab.,  READMITTED to [facility]  , wt. was 63.8 kg , i. e. 0.7 kg wt. loss, transferred to [facility]  wt. - 72.2 kg , current wt. 75.4 kg w/ 2+ generalized edema . Pt. tolerating  EN, RECEIVING  adequate nutrition .

## 2017-09-26 NOTE — PROGRESS NOTE ADULT - SUBJECTIVE AND OBJECTIVE BOX
82 M PMH Asthma, Anxiety, BCC skin, BPH, CVA, Chronic allergic rhinitis, HTN, HLD, RA, Sz DO, w recent esophagectomt 2/2 CA w malfunctioning J-tube.  The patienet presented to [facility] from a rehab center with non-functioning J tube and SOB. He had undergone a robotic-assisted Heber-Trent esophagogastrectomy with feeding J tube placement for esophageal adenocarcinoma (T1aN0) with Dr. Dyer and Dr. Christian Weinstein on 8/14/17. After surgery, patient was found to have an anastomotic leak and was treated conservatively with NPO, antibiotics, chest tube drainage, and J tube feeding. Patient was eventually discharged to rehab. On DOA, patient's J tube was unable to be used, and he was sent to the ED for evaluation. Patient also complained of SOB, but no chest pain or abdominal pain. Denies fever or chills. Patient was taking medications by mouth, but was only getting nutrition via the J tube. Esophagram done on 9/19/17 showing  a leak at the distal site just above the hemidiaphragm into the pleural space.   Patient was found to have a Left pleural effusion, underwent a pigtail placement, developed respiratory distress, was transferred to the SICU where he was intubated. Patient also with right GINA drain.  He was then  transferred to [facility] CTICU for further management.   POD # 43 ([id number]): Heber Trent esophagectomy    Issues:              Distal esophagal leak  Respiratory failure  Hypotension  Pleural effusion  Esophageal leak  Seizure disorder              NSTEMI    Home Medications:   * No Current Medications as of 20-Sep-2017 21:03 documented in Structured Notes  · 	aspirin-dipyridamole 25 mg-200 mg oral capsule, extended release: 1 cap(s) orally 2 times a day  · 	docusate sodium 100 mg oral capsule: 1 cap(s) orally 2 times a day  · 	ProAir HFA 90 mcg/inh inhalation aerosol: 2 puff(s) inhaled 4 times a day, As Needed   · 	Vitamin B-12 500 mcg oral tablet: 1 tab(s) orally once a day  · 	amLODIPine 5 mg oral tablet: 1 tab(s) orally once a day in pm  · 	simvastatin 40 mg oral tablet: 1 tab(s) orally once a day in pm  · 	tamsulosin 0.4 mg oral capsule: 1 cap(s) orally once a day in pm  · 	Zetia 10 mg oral tablet: 1 tab(s) orally once a day in pm  · 	ALPRAZolam 0.25 mg oral tablet: 2 tab(s) orally 2 times a day  · 	olmesartan 20 mg oral tablet: 1 tab(s) orally once a day in pm  · 	montelukast 10 mg oral tablet: 1 tab(s) orally once a day in pm  · 	Dilantin 100 mg oral capsule: 2 cap(s) orally 2 times a day  · 	predniSONE 5 mg oral tablet: 1 tab(s) orally once a day in am  · 	finasteride 5 mg oral tablet: 1 tab(s) orally once a day in pm  · 	folic acid 0.4 mg oral tablet: 1 tab(s) orally once a day  · 	Vitamin B6 100 mg oral tablet: 1 tab(s) orally once a day        MEDICATIONS  (STANDING):  cefepime  IVPB 1000 milliGRAM(s) IV Intermittent every 12 hours  metroNIDAZOLE  IVPB      metroNIDAZOLE  IVPB 500 milliGRAM(s) IV Intermittent every 8 hours  chlorhexidine 4% Liquid 1 Application(s) Topical daily  fosphenytoin IVPB 200 milliGRAM(s) PE IV Intermittent every 12 hours  folic acid Injectable 1 milliGRAM(s) IV Push daily  pyridoxine Injectable 100 milliGRAM(s) IV Push daily  cyanocobalamin Injectable 500 MICROGram(s) SubCutaneous daily  pantoprazole  Injectable 40 milliGRAM(s) IV Push two times a day  methylPREDNISolone sodium succinate Injectable 20 milliGRAM(s) IV Push two times a day  micafungin IVPB 100 milliGRAM(s) IV Intermittent every 24 hours  micafungin IVPB      fluticasone propionate   220 MICROgram(s) HFA Inhaler 1 Puff(s) Inhalation two times a day  sodium chloride 0.9%. 1000 milliLiter(s) (10 mL/Hr) IV Continuous <Continuous>  aspirin Suppository 300 milliGRAM(s) Rectal daily  phenylephrine    Infusion 0.8 MICROgram(s)/kG/Min (21.66 mL/Hr) IV Continuous <Continuous>  ALBUTerol/ipratropium for Nebulization 3 milliLiter(s) Nebulizer every 6 hours  dornase ziyad Solution 2.5 milliGRAM(s) Inhalation two times a day  sodium chloride 3%  Inhalation 4 milliLiter(s) Inhalation every 6 hours  ALPRAZolam 0.5 milliGRAM(s) Oral two times a day  vancomycin  IVPB 1000 milliGRAM(s) IV Intermittent every 24 hours    MEDICATIONS  (PRN):  guaiFENesin    Syrup 200 milliGRAM(s) Oral every 6 hours PRN Cough  HYDROcodone/homatropine Syrup 5 milliLiter(s) Oral every 4 hours PRN Cough  melatonin 3 milliGRAM(s) Oral at bedtime PRN Insomnia      ICU Vital Signs Last 24 Hrs  T(C): 36.5 (26 Sep 2017 08:00), Max: 37.2 (26 Sep 2017 04:00)  T(F): 97.7 (26 Sep 2017 08:00), Max: 99 (26 Sep 2017 04:00)  HR: 86 (26 Sep 2017 08:00) (82 - 97)  BP: 106/58 (26 Sep 2017 08:00) (96/44 - 135/65)  BP(mean): 68 (26 Sep 2017 08:00) (58 - 94)  ABP: --  ABP(mean): --  RR: 19 (26 Sep 2017 08:00) (17 - 28)  SpO2: 98% (26 Sep 2017 08:00) (98% - 100%)      Physical exam:                           General:            Awake, alert, not in any distress  Neuro:              Moving all extremities to commands. No acute change from baseline exam.	  Respiratory:	Lungs clear to auscultation bilaterally. Good aeration.  No rales, rhonchi. Effort even and unlabored.  CV:		Regular rate and rhythm. Normal S1/S2. No murmurs Distal pulses present.  Abdomen:	Soft, non-distended. Bowel sounds present, loss stoolsx4.    Skin:		No rash.  Extremities:	Warm, no cyanosis or edema.  Palpable pulses    I&O's Summary    25 Sep 2017 07:01  -  26 Sep 2017 07:00  --------------------------------------------------------  IN: 2725 mL / OUT: 1510 mL / NET: 1215 mL    26 Sep 2017 07:01  -  26 Sep 2017 08:57  --------------------------------------------------------  IN: 90 mL / OUT: 100 mL / NET: -10 mL    Labs:                                                                           9.8    10.22 )-----------( 200      ( 26 Sep 2017 03:00 )             32.0                            09-26    139  |  104  |  17  ----------------------------<  111<H>  4.4   |  23  |  0.49<L>    Ca    8.5      26 Sep 2017 03:00    TPro  5.9<L>  /  Alb  2.1<L>  /  TBili  0.3  /  DBili  x   /  AST  31  /  ALT  42<H>  /  AlkPhos  130<H>  09-26    CAPILLARY BLOOD GLUCOSE  108 (26 Sep 2017 01:00)    LIVER FUNCTIONS - ( 26 Sep 2017 03:00 )  Alb: 2.1 g/dL / Pro: 5.9 g/dL / ALK PHOS: 130 u/L / ALT: 42 u/L / AST: 31 u/L / GGT: x                                Plan:  82 M PMH Asthma, Anxiety, BCC skin, BPH, CVA, Chronic allergic rhinitis, HTN, HLD, RA, Sz DO, w recent esophagectomt 2/2 CA w malfunctioning J-tube.  The patienet presented to [facility] from a rehab center with non-functioning J tube and SOB. He had undergone a robotic-assisted Vance-Trent esophagogastrectomy with feeding J tube placement for esophageal adenocarcinoma (T1aN0) with Dr. Dyer and Dr. Christian Weinstein on 8/14/17. After surgery, patient was found to have an anastomotic leak and was treated conservatively with NPO, antibiotics, chest tube drainage, and J tube feeding. Patient was eventually discharged to rehab. On DOA, patient's J tube was unable to be used, and he was sent to the ED for evaluation. Patient also complained of SOB, but no chest pain or abdominal pain. Denies fever or chills. Patient was taking medications by mouth, but was only getting nutrition via the J tube. Esophagram done on 9/19/17 showing  a leak at the distal site just above the hemidiaphragm into the pleural space.   Patient was found to have a Left pleural effusion, underwent a pigtail placement, developed respiratory distress, was transferred to the SICU where he was intubated. Patient also with right GINA drain.  He was then  transferred to [facility] CTICU for further management.   POD # 43 ([id number]): Heber Trent esophagectomy    Issues:              Distal esophagal leak  Respiratory failure  Hypotension  Pleural effusion  Esophageal leak  Seizure disorder              NSTEMI                               Neuro:                                         Pain control with Tylenol IV , prn  Fentanyl                            Cardiovascular:                                          Continue hemodynamic monitoring.                                        Not on any pressors.                            Respiratory:                                         Pt is on  2  L nasal canula                                         Comfortable, not in any distress.                                         Using incentive spirometry 	                                        Continue bronchodilators, pulmonary toilet, CXR with RLL atelectasis will bronch if not improving after aggressive chest PT                            GI                                         On clears / regular diet, tolerating                                         Continue GI prophylaxis with Pepcid / Protonix                                         Continue Zofran / Reglan for nausea - PRN	                                         NPO                                  Renal:                                         Continue IVF                                         Monitor I/Os and electrolytes                                         Keep López                                                 Hematologic / Oncology:                                                                                  Monitor chest tube output. No signs of active bleeding.                                          Follow CBC in AM                           Infectious disease:                                           Monitor GINA output. No signs of active bleeding.                                          Follow CBC in AM                                         DVT prophylaxis with SCD, sc Heparin                            Endocrine:                                             Glycemic monitoring                                           F/S with coverage    All available pertinent clinical, lab, hemodynamic and radiographic data were reviewed.   Plan discussed with CTICU team and attending CT surgeon.     I have spent  60 minutes of critical care time with this patient between 7am and 11 pm    Aleksandr Hayden MD

## 2017-09-26 NOTE — PROGRESS NOTE ADULT - SUBJECTIVE AND OBJECTIVE BOX
MEJIA LATIF:[id number],   82yMale followed for: esophag cancer, leak  penicillin (Rash)    PAST MEDICAL & SURGICAL HISTORY:  Coronary artery disease: s/p 3 stents on June 30, 2017 at [place] Dr. Lavelle Reid  OA (osteoarthritis) of knee: right  Former smoker, stopped smoking in distant past  Rheumatoid arthritis  Seizure disorder: 1 episode approximately 15 yrs ago  Asthma  Hyperlipidemia  BPH (benign prostatic hyperplasia)  HTN (hypertension)  Esophagus cancer  Chronic allergic rhinitis  BCC (basal cell carcinoma): skin  Cerebrovascular accident (CVA)  Anxiety  Anastomotic leak following esophagectomy: [place] esophagectomy  History of tonsillectomy  H/O heart artery stent: June 30, 2017  Knee arthropathy: left  History of total hip replacement: left  History of hernia repair    FAMILY HISTORY:  Family history of heart attack (Sibling)  Family history of pulmonary embolism: father @ 49 yr old  FH: CAD (coronary artery disease)    MEDICATIONS  (STANDING):  cefepime  IVPB 1000 milliGRAM(s) IV Intermittent every 12 hours  metroNIDAZOLE  IVPB      metroNIDAZOLE  IVPB 500 milliGRAM(s) IV Intermittent every 8 hours  chlorhexidine 4% Liquid 1 Application(s) Topical daily  fosphenytoin IVPB 200 milliGRAM(s) PE IV Intermittent every 12 hours  folic acid Injectable 1 milliGRAM(s) IV Push daily  pyridoxine Injectable 100 milliGRAM(s) IV Push daily  cyanocobalamin Injectable 500 MICROGram(s) SubCutaneous daily  pantoprazole  Injectable 40 milliGRAM(s) IV Push two times a day  methylPREDNISolone sodium succinate Injectable 20 milliGRAM(s) IV Push two times a day  micafungin IVPB 100 milliGRAM(s) IV Intermittent every 24 hours  micafungin IVPB      fluticasone propionate   220 MICROgram(s) HFA Inhaler 1 Puff(s) Inhalation two times a day  sodium chloride 0.9%. 1000 milliLiter(s) (10 mL/Hr) IV Continuous <Continuous>  aspirin Suppository 300 milliGRAM(s) Rectal daily  phenylephrine    Infusion 0.8 MICROgram(s)/kG/Min (21.66 mL/Hr) IV Continuous <Continuous>  ALBUTerol/ipratropium for Nebulization 3 milliLiter(s) Nebulizer every 6 hours  dornase ziyad Solution 2.5 milliGRAM(s) Inhalation two times a day  sodium chloride 3%  Inhalation 4 milliLiter(s) Inhalation every 6 hours  ALPRAZolam 0.5 milliGRAM(s) Oral two times a day  vancomycin  IVPB 1000 milliGRAM(s) IV Intermittent every 24 hours    MEDICATIONS  (PRN):  guaiFENesin    Syrup 200 milliGRAM(s) Oral every 6 hours PRN Cough  HYDROcodone/homatropine Syrup 5 milliLiter(s) Oral every 4 hours PRN Cough  melatonin 3 milliGRAM(s) Oral at bedtime PRN Insomnia      Vital Signs Last 24 Hrs  T(C): 36.4 (26 Sep 2017 01:00), Max: 36.8 (25 Sep 2017 12:00)  T(F): 97.6 (26 Sep 2017 01:00), Max: 98.2 (25 Sep 2017 12:00)  HR: 89 (26 Sep 2017 04:30) (84 - 97)  BP: 116/71 (26 Sep 2017 04:00) (96/44 - 135/65)  BP(mean): 81 (26 Sep 2017 04:00) (58 - 94)  RR: 25 (26 Sep 2017 04:00) (17 - 28)  SpO2: 100% (26 Sep 2017 04:30) (98% - 100%)  nc/at  s1s2  cta  soft, nt, nd no guarding or rebound  no c/c/e    CBC Full  -  ( 26 Sep 2017 03:00 )  WBC Count : 10.22 K/uL  Hemoglobin : 9.8 g/dL  Hematocrit : 32.0 %  Platelet Count - Automated : 200 K/uL  Mean Cell Volume : 93.6 fL  Mean Cell Hemoglobin : 28.7 pg  Mean Cell Hemoglobin Concentration : 30.6 %  Auto Neutrophil # : 8.24 K/uL  Auto Lymphocyte # : 0.75 K/uL  Auto Monocyte # : 0.87 K/uL  Auto Eosinophil # : 0.27 K/uL  Auto Basophil # : 0.04 K/uL  Auto Neutrophil % : 80.7 %  Auto Lymphocyte % : 7.3 %  Auto Monocyte % : 8.5 %  Auto Eosinophil % : 2.6 %  Auto Basophil % : 0.4 %    09-26    139  |  104  |  17  ----------------------------<  111<H>  4.4   |  23  |  0.49<L>    Ca    8.5      26 Sep 2017 03:00    TPro  5.9<L>  /  Alb  2.1<L>  /  TBili  0.3  /  DBili  x   /  AST  31  /  ALT  42<H>  /  AlkPhos  130<H>  09-26

## 2017-09-26 NOTE — PROGRESS NOTE ADULT - SUBJECTIVE AND OBJECTIVE BOX
Follow Up:  anastomotic leak, loculated pleural effusion s/p pigtail    Interval History/ROS: feels well c/o pain in feet    Allergies  penicillin (Rash)        ANTIMICROBIALS:  cefepime  IVPB 1000 every 12 hours  metroNIDAZOLE  IVPB    metroNIDAZOLE  IVPB 500 every 8 hours  micafungin IVPB 100 every 24 hours  micafungin IVPB    vancomycin  IVPB 1000 every 24 hours      OTHER MEDS:  MEDICATIONS  (STANDING):  fosphenytoin IVPB 200 every 12 hours  pantoprazole  Injectable 40 two times a day  methylPREDNISolone sodium succinate Injectable 20 two times a day  fluticasone propionate   220 MICROgram(s) HFA Inhaler 1 two times a day  aspirin Suppository 300 daily  phenylephrine    Infusion 0.8 <Continuous>  ALBUTerol/ipratropium for Nebulization 3 every 6 hours  dornase ziyad Solution 2.5 two times a day  sodium chloride 3%  Inhalation 4 every 6 hours  guaiFENesin    Syrup 200 every 6 hours PRN  HYDROcodone/homatropine Syrup 5 every 4 hours PRN  melatonin 3 at bedtime PRN  ALPRAZolam 0.5 two times a day      Vital Signs Last 24 Hrs  T(C): 36.5 (26 Sep 2017 08:00), Max: 37.2 (26 Sep 2017 04:00)  T(F): 97.7 (26 Sep 2017 08:00), Max: 99 (26 Sep 2017 04:00)  HR: 94 (26 Sep 2017 11:00) (82 - 97)  BP: 110/50 (26 Sep 2017 11:00) (96/44 - 135/58)  BP(mean): 65 (26 Sep 2017 11:00) (58 - 94)  RR: 20 (26 Sep 2017 11:00) (17 - 28)  SpO2: 100% (26 Sep 2017 11:00) (98% - 100%)    PHYSICAL EXAM:  General: non-toxic on O2 via nasal canula, sitting up in chair  HEAD/EYES: anicteric, PERRL  ENT:  supple  Cardiovascular:   S1, S2  Respiratory:  clear bilaterally right chest pigtail  GI:  soft, non-tender, normal bowel sounds  :  no CVA tenderness   Musculoskeletal:  no synovitis  Neurologic:  grossly non-focal  Skin:  b/l pitting edema both LE  Lymph: no lymphadenopathy  Psychiatric:  appropriate affect  Vascular:  no phlebitis                                9.8    10.22 )-----------( 200      ( 26 Sep 2017 03:00 )             32.0       09-26    139  |  104  |  17  ----------------------------<  111<H>  4.4   |  23  |  0.49<L>    Ca    8.5      26 Sep 2017 03:00    TPro  5.9<L>  /  Alb  2.1<L>  /  TBili  0.3  /  DBili  x   /  AST  31  /  ALT  42<H>  /  AlkPhos  130<H>  09-26          MICROBIOLOGY:  Vancomycin Level, Trough: 15.1 ug/mL (09-26-17 @ 03:00)  v      C Diff by PCR Result: NotDetec (09-20 @ 07:34)  Rapid RVP Result: NotDetec (09-19 @ 14:22)    C Diff by PCR Result: NotDetec (09-20 @ 07:34)      RADIOLOGY:  < from: Xray Chest 1 View AP -PORTABLE-Routine (09.26.17 @ 06:39) >    IMPRESSION:  Left IJ line and right chest tube unchanged.    No significant change in right mid and lower lung opacity likely related   to loculated pleural fluid seen on the CT scan.    Continued bibasilar opacity which may be due to subsegmental atelectasis.    < end of copied text > Follow Up:  anastomotic leak, loculated pleural effusion s/p pigtail    Interval History/ROS: feels well c/o pain in feet.  Otherwise, less SOB.  Diarrhea is better.  Rest of ROS otherwise negative.    Allergies  penicillin (Rash)    ANTIMICROBIALS:    cefepime  IVPB 1000 every 12 hours - started 9/15-  vancomycin  IVPB 750 every 12 hours - started 9/15-  vancomycin    Solution 125 every 6 hours - started   metroNIDAZOLE  IVPB 500 every 8 hours - started 9/15-  micafungin IVPB - started 9/21    OTHER MEDS:  MEDICATIONS  (STANDING):  fosphenytoin IVPB 200 every 12 hours  pantoprazole  Injectable 40 two times a day  methylPREDNISolone sodium succinate Injectable 20 two times a day  fluticasone propionate   220 MICROgram(s) HFA Inhaler 1 two times a day  aspirin Suppository 300 daily  phenylephrine    Infusion 0.8 <Continuous>  ALBUTerol/ipratropium for Nebulization 3 every 6 hours  dornase ziyad Solution 2.5 two times a day  sodium chloride 3%  Inhalation 4 every 6 hours  guaiFENesin    Syrup 200 every 6 hours PRN  melatonin 3 at bedtime PRN  ALPRAZolam 0.5 two times a day    Vital Signs Last 24 Hrs  T(C): 36.5 (26 Sep 2017 08:00), Max: 37.2 (26 Sep 2017 04:00)  T(F): 97.7 (26 Sep 2017 08:00), Max: 99 (26 Sep 2017 04:00)  HR: 94 (26 Sep 2017 11:00) (82 - 97)  BP: 110/50 (26 Sep 2017 11:00) (96/44 - 135/58)  BP(mean): 65 (26 Sep 2017 11:00) (58 - 94)  RR: 20 (26 Sep 2017 11:00) (17 - 28)  SpO2: 100% (26 Sep 2017 11:00) (98% - 100%)    PHYSICAL EXAM:  family at bedside  General: non-toxic on O2 via nasal canula, sitting up in chair  HEAD/EYES: anicteric, PERRL  ENT:  supple  Cardiovascular:   S1, S2  Respiratory:  clear bilaterally right chest pigtail  GI:  soft, non-tender, normal bowel sounds; J-tube  :  no CVA tenderness   Musculoskeletal:  no synovitis  Neurologic:  grossly non-focal  Skin:  b/l pitting edema both LE  Lymph: no lymphadenopathy  Psychiatric:  appropriate affect  Vascular:  no phlebitis                       9.8    10.22 )-----------( 200      ( 26 Sep 2017 03:00 )             32.0     139  |  104  |  17  ----------------------------<  111<H>  4.4   |  23  |  0.49<L>    Ca    8.5      26 Sep 2017 03:00    TPro  5.9<L>  /  Alb  2.1<L>  /  TBili  0.3  /  DBili  x   /  AST  31  /  ALT  42<H>  /  AlkPhos  130<H>  09-26    Vancomycin Level, Trough: 15.1 ug/mL (09-26-17 @ 03:00    MICROBIOLOGY:  Culture - Body Fluid with Gram Stain (09.21.17 @ 21:18)    Gram Stain:  WBC^White Blood Cells QNTY CELLS IN GRAM STAIN: NO CELLS SEEN   NOS^No Organisms Seen    Specimen Source: PLEURAL FLUID    Culture - Blood (09.21.17 @ 02:01)  Staphylococcus epidermidis    Culture - Blood (09.21.17 @ 02:01)  STEP^Staphylococcus epidermidis    C Diff by PCR Result: NotDetec (09.20.17 @ 07:34)    Culture - Body Fluid with Gram Stain (09.17.17 @ 12:42)    Specimen Source: Pleural Fl Other, Pleural Fluid    Culture Results:   No growth to date.    Culture - Blood (09.18.17 @ 08:34)    Specimen Source: .Blood Blood    Culture Results:   No growth at 5 days.    Culture - Body Fluid with Gram Stain (09.17.17 @ 12:42)  Pleural Fl Other, Pleural Fluid  No growth to date.    Specimen Source: .Blood Blood-Venous (09.16.17 @ 15:30)  Culture Results:   No growth at 5 days. (09.16.17 @ 15:30)    Culture - Body Fluid with Gram Stain (09.16.17 @ 00:58)  .Body Fluid Pleural Fluid    Culture Results:   No growth at 5 days    Specimen Source: .Blood Blood-Venous (09.15.17 @ 07:47)  Negative    Culture - Respiratory with Gram Stain (08.20.17 @ 16:47)    Gram Stain Sputum:     Specimen Source: SPUTUM    Organism Identification: Stenotrophomonas maltophilia    Organism: Stenotrophomonas maltophilia    C Diff by PCR Result: NotDetec (09-20 @ 07:34)  Rapid RVP Result: NotDetec (09-19 @ 14:22)    C Diff by PCR Result: NotDetec (09-20 @ 07:34)    RADIOLOGY:  Xray Chest 1 View AP -PORTABLE-Routine (09.26.17 @ 06:39)   IMPRESSION:  Left IJ line and right chest tube unchanged.  No significant change in right mid and lower lung opacity likely related to loculated pleural fluid seen on the CT scan.  Continued bibasilar opacity which may be due to subsegmental atelectasis.    RADIOLOGY:  CT Abdomen and Pelvis w/ IV Cont (09.21.17 @ 12:50)   IMPRESSION:   Status post esophagectomy and gastric pull-through with adjacent extraluminal contrast consistent with known leak.  Tracheostomy tube in place.  Moderate sized pleural effusions with loculations.  Evidence of pericarditis with small to moderate-sized pericardial effusion.  J-tube in place.  Small volume ascites. Anasarca.    Xray Esophagram (09.19.17 @ 12:08)   IMPRESSION:  Status post Vance Trent. Findings as described above consistent with a leak at the distal site just above the hemidiaphragm into the pleural space.       Transthoracic Echocardiogram (09.18.17 @ 10:15) >  CONCLUSIONS:  1. Mitral annular calcification and calcified mitral leaflets with normal diastolic opening.  2. Calcified aortic valve with decreased opening.  3. Low Normal left ventricular systolic function. EF 50%  4. The right ventricle is not well visualized; grossly normal right ventricular systolic function.  5. Thickened  pericardium with small  0.9cm circumferential pericardial effusion. There is evidence of raised intra pericardial pressure with RA buckling however no  tamponade seen.

## 2017-09-26 NOTE — PROGRESS NOTE ADULT - ASSESSMENT
pt doing better, extubated.  case d/w dr. reina.  conservative management of leak preferred from surgical perspective.

## 2017-09-26 NOTE — DIETITIAN INITIAL EVALUATION ADULT. - PERTINENT MEDS FT
Phenylephrine, Maxipime, Flagyl, Mycamine, Vancomycin, Melatonin, Xanax, Cerebyx, Cyanocobalamin, Protonix, Folic acid, SoluMEDROL,

## 2017-09-26 NOTE — PROGRESS NOTE ADULT - SUBJECTIVE AND OBJECTIVE BOX
Surgery Progress Note    S: Patient seen and examined. No acute events overnight. Pain well controlled with current regimen. Denies nausea/vomiting.    O:  Vital Signs Last 24 Hrs  T(C): 37 (27 Sep 2017 00:00), Max: 37.2 (26 Sep 2017 12:00)  T(F): 98.6 (27 Sep 2017 00:00), Max: 99 (26 Sep 2017 12:00)  HR: 87 (27 Sep 2017 03:00) (82 - 96)  BP: 108/60 (27 Sep 2017 03:00) (106/58 - 157/77)  BP(mean): 72 (27 Sep 2017 03:00) (65 - 101)  RR: 24 (27 Sep 2017 03:00) (17 - 29)  SpO2: 100% (27 Sep 2017 03:00) (98% - 100%)    I&O's Detail    25 Sep 2017 07:01  -  26 Sep 2017 07:00  --------------------------------------------------------  IN:    Enteral Tube Flush: 145 mL    IV PiggyBack: 900 mL    Jevity: 1440 mL    sodium chloride 0.9%.: 240 mL  Total IN: 2725 mL    OUT:    Drain: 10 mL    Indwelling Catheter - Urethral: 1500 mL  Total OUT: 1510 mL    Total NET: 1215 mL      26 Sep 2017 07:01  -  27 Sep 2017 04:26  --------------------------------------------------------  IN:    Enteral Tube Flush: 100 mL    IV PiggyBack: 400 mL    Jevity: 1200 mL    sodium chloride 0.9%.: 200 mL  Total IN: 1900 mL    OUT:    Drain: 5 mL    Indwelling Catheter - Urethral: 2550 mL  Total OUT: 2555 mL    Total NET: -655 mL          MEDICATIONS  (STANDING):  cefepime  IVPB 1000 milliGRAM(s) IV Intermittent every 12 hours  metroNIDAZOLE  IVPB      metroNIDAZOLE  IVPB 500 milliGRAM(s) IV Intermittent every 8 hours  chlorhexidine 4% Liquid 1 Application(s) Topical daily  folic acid Injectable 1 milliGRAM(s) IV Push daily  pyridoxine Injectable 100 milliGRAM(s) IV Push daily  cyanocobalamin Injectable 500 MICROGram(s) SubCutaneous daily  pantoprazole  Injectable 40 milliGRAM(s) IV Push two times a day  methylPREDNISolone sodium succinate Injectable 20 milliGRAM(s) IV Push two times a day  micafungin IVPB 100 milliGRAM(s) IV Intermittent every 24 hours  micafungin IVPB      fluticasone propionate   220 MICROgram(s) HFA Inhaler 1 Puff(s) Inhalation two times a day  sodium chloride 0.9%. 1000 milliLiter(s) (10 mL/Hr) IV Continuous <Continuous>  aspirin Suppository 300 milliGRAM(s) Rectal daily  ALBUTerol/ipratropium for Nebulization 3 milliLiter(s) Nebulizer every 6 hours  dornase ziyad Solution 2.5 milliGRAM(s) Inhalation two times a day  vancomycin  IVPB 1000 milliGRAM(s) IV Intermittent every 24 hours  fosphenytoin IVPB 200 milliGRAM(s) PE IV Intermittent every 12 hours    MEDICATIONS  (PRN):  guaiFENesin    Syrup 200 milliGRAM(s) Oral every 6 hours PRN Cough  HYDROcodone/homatropine Syrup 5 milliLiter(s) Oral every 4 hours PRN Cough  melatonin 3 milliGRAM(s) Oral at bedtime PRN Insomnia  LORazepam   Injectable 0.25 milliGRAM(s) IV Push two times a day PRN Anxiety                            9.2    8.62  )-----------( 179      ( 27 Sep 2017 03:40 )             28.9       09-26    139  |  104  |  17  ----------------------------<  111<H>  4.4   |  23  |  0.49<L>    Ca    8.5      26 Sep 2017 03:00    TPro  5.9<L>  /  Alb  2.1<L>  /  TBili  0.3  /  DBili  x   /  AST  31  /  ALT  42<H>  /  AlkPhos  130<H>  09-26      Physical Exam:  Gen: Laying in bed, NAD, alert and oriented.   Resp: Unlabored breathing  Abd: soft, NTND, J-tube site c/d/i

## 2017-09-26 NOTE — PROGRESS NOTE ADULT - ASSESSMENT
ASSESSMENT:  82M esophageal ca s/p chemo/RT followed by [place] esophagogastrectomy with feeding J tube. Complicated by leak s/p IV abx    PLAN:    - J-tube feeds   - Monitor respiratory status  - Monitor pressor requirements  - Care per CTICU

## 2017-09-27 LAB
BACTERIA FLD CULT: SIGNIFICANT CHANGE UP
BUN SERPL-MCNC: 16 MG/DL — SIGNIFICANT CHANGE UP (ref 7–23)
CALCIUM SERPL-MCNC: 8.3 MG/DL — LOW (ref 8.4–10.5)
CHLORIDE SERPL-SCNC: 104 MMOL/L — SIGNIFICANT CHANGE UP (ref 98–107)
CO2 SERPL-SCNC: 27 MMOL/L — SIGNIFICANT CHANGE UP (ref 22–31)
CREAT SERPL-MCNC: 0.46 MG/DL — LOW (ref 0.5–1.3)
GLUCOSE SERPL-MCNC: 106 MG/DL — HIGH (ref 70–99)
HCT VFR BLD CALC: 28.9 % — LOW (ref 39–50)
HGB BLD-MCNC: 9.2 G/DL — LOW (ref 13–17)
MAGNESIUM SERPL-MCNC: 1.7 MG/DL — SIGNIFICANT CHANGE UP (ref 1.6–2.6)
MCHC RBC-ENTMCNC: 30.1 PG — SIGNIFICANT CHANGE UP (ref 27–34)
MCHC RBC-ENTMCNC: 31.8 % — LOW (ref 32–36)
MCV RBC AUTO: 94.4 FL — SIGNIFICANT CHANGE UP (ref 80–100)
NRBC # FLD: 0 — SIGNIFICANT CHANGE UP
PHOSPHATE SERPL-MCNC: 2.6 MG/DL — SIGNIFICANT CHANGE UP (ref 2.5–4.5)
PLATELET # BLD AUTO: 179 K/UL — SIGNIFICANT CHANGE UP (ref 150–400)
PMV BLD: 9.3 FL — SIGNIFICANT CHANGE UP (ref 7–13)
POTASSIUM SERPL-MCNC: 4 MMOL/L — SIGNIFICANT CHANGE UP (ref 3.5–5.3)
POTASSIUM SERPL-SCNC: 4 MMOL/L — SIGNIFICANT CHANGE UP (ref 3.5–5.3)
RBC # BLD: 3.06 M/UL — LOW (ref 4.2–5.8)
RBC # FLD: 14.7 % — HIGH (ref 10.3–14.5)
SODIUM SERPL-SCNC: 138 MMOL/L — SIGNIFICANT CHANGE UP (ref 135–145)
VANCOMYCIN TROUGH SERPL-MCNC: 12.4 UG/ML — SIGNIFICANT CHANGE UP (ref 10–20)
WBC # BLD: 8.62 K/UL — SIGNIFICANT CHANGE UP (ref 3.8–10.5)
WBC # FLD AUTO: 8.62 K/UL — SIGNIFICANT CHANGE UP (ref 3.8–10.5)

## 2017-09-27 PROCEDURE — 71010: CPT | Mod: 26

## 2017-09-27 PROCEDURE — 99233 SBSQ HOSP IP/OBS HIGH 50: CPT

## 2017-09-27 PROCEDURE — 99232 SBSQ HOSP IP/OBS MODERATE 35: CPT

## 2017-09-27 RX ORDER — AMLODIPINE BESYLATE 2.5 MG/1
2.5 TABLET ORAL DAILY
Qty: 0 | Refills: 0 | Status: DISCONTINUED | OUTPATIENT
Start: 2017-09-27 | End: 2017-09-27

## 2017-09-27 RX ORDER — METOPROLOL TARTRATE 50 MG
2.5 TABLET ORAL EVERY 6 HOURS
Qty: 0 | Refills: 0 | Status: DISCONTINUED | OUTPATIENT
Start: 2017-09-27 | End: 2017-09-30

## 2017-09-27 RX ORDER — NYSTATIN CREAM 100000 [USP'U]/G
1 CREAM TOPICAL
Qty: 0 | Refills: 0 | Status: DISCONTINUED | OUTPATIENT
Start: 2017-09-27 | End: 2017-10-04

## 2017-09-27 RX ORDER — LIDOCAINE HCL 20 MG/ML
20 VIAL (ML) INJECTION ONCE
Qty: 0 | Refills: 0 | Status: COMPLETED | OUTPATIENT
Start: 2017-09-27 | End: 2017-09-27

## 2017-09-27 RX ORDER — MAGNESIUM SULFATE 500 MG/ML
2 VIAL (ML) INJECTION ONCE
Qty: 0 | Refills: 0 | Status: COMPLETED | OUTPATIENT
Start: 2017-09-27 | End: 2017-09-27

## 2017-09-27 RX ORDER — POTASSIUM PHOSPHATE, MONOBASIC POTASSIUM PHOSPHATE, DIBASIC 236; 224 MG/ML; MG/ML
15 INJECTION, SOLUTION INTRAVENOUS ONCE
Qty: 0 | Refills: 0 | Status: COMPLETED | OUTPATIENT
Start: 2017-09-27 | End: 2017-09-27

## 2017-09-27 RX ORDER — FINASTERIDE 5 MG/1
5 TABLET, FILM COATED ORAL AT BEDTIME
Qty: 0 | Refills: 0 | Status: DISCONTINUED | OUTPATIENT
Start: 2017-09-27 | End: 2017-09-27

## 2017-09-27 RX ADMIN — POTASSIUM PHOSPHATE, MONOBASIC POTASSIUM PHOSPHATE, DIBASIC 62.5 MILLIMOLE(S): 236; 224 INJECTION, SOLUTION INTRAVENOUS at 05:21

## 2017-09-27 RX ADMIN — CEFEPIME 100 MILLIGRAM(S): 1 INJECTION, POWDER, FOR SOLUTION INTRAMUSCULAR; INTRAVENOUS at 05:07

## 2017-09-27 RX ADMIN — Medication 3 MILLILITER(S): at 09:14

## 2017-09-27 RX ADMIN — Medication 1 PUFF(S): at 09:16

## 2017-09-27 RX ADMIN — Medication 100 MILLIGRAM(S): at 14:37

## 2017-09-27 RX ADMIN — Medication 3 MILLIGRAM(S): at 23:05

## 2017-09-27 RX ADMIN — FOSPHENYTOIN 108 MILLIGRAM(S) PE: 50 INJECTION INTRAMUSCULAR; INTRAVENOUS at 17:55

## 2017-09-27 RX ADMIN — CEFEPIME 100 MILLIGRAM(S): 1 INJECTION, POWDER, FOR SOLUTION INTRAMUSCULAR; INTRAVENOUS at 17:35

## 2017-09-27 RX ADMIN — Medication 20 MILLIGRAM(S): at 17:35

## 2017-09-27 RX ADMIN — Medication 3 MILLILITER(S): at 22:17

## 2017-09-27 RX ADMIN — Medication 2.5 MILLIGRAM(S): at 13:31

## 2017-09-27 RX ADMIN — Medication 1 MILLIGRAM(S): at 12:28

## 2017-09-27 RX ADMIN — Medication 50 GRAM(S): at 05:21

## 2017-09-27 RX ADMIN — PANTOPRAZOLE SODIUM 40 MILLIGRAM(S): 20 TABLET, DELAYED RELEASE ORAL at 17:35

## 2017-09-27 RX ADMIN — Medication 100 MILLIGRAM(S): at 12:29

## 2017-09-27 RX ADMIN — Medication 300 MILLIGRAM(S): at 12:26

## 2017-09-27 RX ADMIN — SODIUM CHLORIDE 10 MILLILITER(S): 9 INJECTION INTRAMUSCULAR; INTRAVENOUS; SUBCUTANEOUS at 05:07

## 2017-09-27 RX ADMIN — Medication 3 MILLILITER(S): at 15:56

## 2017-09-27 RX ADMIN — Medication 100 MILLIGRAM(S): at 21:02

## 2017-09-27 RX ADMIN — SODIUM CHLORIDE 10 MILLILITER(S): 9 INJECTION INTRAMUSCULAR; INTRAVENOUS; SUBCUTANEOUS at 21:03

## 2017-09-27 RX ADMIN — Medication 20 MILLIGRAM(S): at 05:08

## 2017-09-27 RX ADMIN — Medication 100 MILLIGRAM(S): at 05:05

## 2017-09-27 RX ADMIN — PREGABALIN 500 MICROGRAM(S): 225 CAPSULE ORAL at 12:26

## 2017-09-27 RX ADMIN — CHLORHEXIDINE GLUCONATE 1 APPLICATION(S): 213 SOLUTION TOPICAL at 08:00

## 2017-09-27 RX ADMIN — Medication 250 MILLIGRAM(S): at 04:08

## 2017-09-27 RX ADMIN — FOSPHENYTOIN 108 MILLIGRAM(S) PE: 50 INJECTION INTRAMUSCULAR; INTRAVENOUS at 05:07

## 2017-09-27 RX ADMIN — PANTOPRAZOLE SODIUM 40 MILLIGRAM(S): 20 TABLET, DELAYED RELEASE ORAL at 05:08

## 2017-09-27 RX ADMIN — DORNASE ALFA 2.5 MILLIGRAM(S): 1 SOLUTION RESPIRATORY (INHALATION) at 09:14

## 2017-09-27 RX ADMIN — Medication 20 MILLILITER(S): at 10:15

## 2017-09-27 RX ADMIN — DORNASE ALFA 2.5 MILLIGRAM(S): 1 SOLUTION RESPIRATORY (INHALATION) at 22:17

## 2017-09-27 RX ADMIN — Medication 2.5 MILLIGRAM(S): at 23:05

## 2017-09-27 RX ADMIN — Medication 3 MILLILITER(S): at 04:24

## 2017-09-27 RX ADMIN — MICAFUNGIN SODIUM 105 MILLIGRAM(S): 100 INJECTION, POWDER, LYOPHILIZED, FOR SOLUTION INTRAVENOUS at 13:37

## 2017-09-27 RX ADMIN — Medication 2.5 MILLIGRAM(S): at 17:35

## 2017-09-27 RX ADMIN — NYSTATIN CREAM 1 APPLICATION(S): 100000 CREAM TOPICAL at 17:55

## 2017-09-27 RX ADMIN — SODIUM CHLORIDE 10 MILLILITER(S): 9 INJECTION INTRAMUSCULAR; INTRAVENOUS; SUBCUTANEOUS at 17:36

## 2017-09-27 RX ADMIN — Medication 1 PUFF(S): at 22:19

## 2017-09-27 NOTE — PROGRESS NOTE ADULT - ASSESSMENT
82M esophageal ca s/p chemo/RT followed by [place]Trent esophagogastrectomy with feeding J tube. Complicated by leak managed conservatively.    PLAN:  - J-tube feeds   - Continue abx  - Monitor respiratory status  - Care per CTICU 82M esophageal ca s/p chemo/RT followed by [place]-Trent esophagogastrectomy with feeding J tube. Complicated by leak managed conservatively.    PLAN:  - J-tube feeds at goal  - Continue abx  - Monitor respiratory status  - Care per CTICU

## 2017-09-27 NOTE — PROGRESS NOTE ADULT - SUBJECTIVE AND OBJECTIVE BOX
Follow Up:  anastomotic leak, loculated pleural effusion    Interval History/ROS:  feels worse today.  had chest tube placed.  feels like he is drowning but this is better since placement of the chest tube.  Tired.  Rest of ROS otherwise negative.    Allergies  penicillin (Rash)    ANTIMICROBIALS:    cefepime  IVPB 1000 every 12 hours - started 9/15-  vancomycin  IVPB 750 every 12 hours - started 9/15-  vancomycin    Solution 125 every 6 hours - started   metroNIDAZOLE  IVPB 500 every 8 hours - started 9/15-  micafungin IVPB - started 9/21    OTHER MEDS:  MEDICATIONS  (STANDING):  fosphenytoin IVPB 200 every 12 hours  pantoprazole  Injectable 40 two times a day  methylPREDNISolone sodium succinate Injectable 20 two times a day  fluticasone propionate   220 MICROgram(s) HFA Inhaler 1 two times a day  aspirin Suppository 300 daily  phenylephrine    Infusion 0.8 <Continuous>  ALBUTerol/ipratropium for Nebulization 3 every 6 hours  dornase ziyad Solution 2.5 two times a day  sodium chloride 3%  Inhalation 4 every 6 hours  guaiFENesin    Syrup 200 every 6 hours PRN  melatonin 3 at bedtime PRN  ALPRAZolam 0.5 two times a day    Vital Signs Last 24 Hrs  T(F): 97.8 (09-27-17 @ 12:00), Max: 98.8 (09-26-17 @ 20:00)  HR: 88 (09-27-17 @ 14:00)  BP: 132/61 (09-27-17 @ 14:00)  RR: 26 (09-27-17 @ 14:00)  SpO2: 100% (09-27-17 @ 14:00) (95% - 100%)  Wt(kg): --    PHYSICAL EXAM:  family at bedside  General: non-toxic on O2 via nasal canula, sitting up in chair  HEAD/EYES: anicteric, PERRL  ENT:  supple  Cardiovascular:   S1, S2  Respiratory:  clear bilaterally right chest pigtail; R CT  GI:  soft, non-tender, normal bowel sounds; J-tube  :  no CVA tenderness   Musculoskeletal:  no synovitis  Neurologic:  grossly non-focal  Skin:  b/l pitting edema both LE  Lymph: no lymphadenopathy  Psychiatric:  appropriate affect  Vascular:  no phlebitis                       9.2    8.62  )-----------( 179      ( 27 Sep 2017 03:40 )             28.9 09-27    138  |  104  |  16  ----------------------------<  106  4.0   |  27  |  0.46  Ca    8.3      27 Sep 2017 03:40Phos  2.6     09-27Mg     1.7     09-27  TPro  5.9  /  Alb  2.1  /  TBili  0.3  /  DBili  x   /  AST  31  /  ALT  42  /  AlkPhos  130  09-26    Vancomycin Level, Trough: 12.4 ug/mL (09.27.17 @ 03:40)  Vancomycin Level, Trough: 15.1 ug/mL (09-26-17 @ 03:00  Vancomycin Level, Trough: 20.2 ug/mL (09.25.17 @ 10:15)    MICROBIOLOGY:  Culture - Body Fluid with Gram Stain (09.21.17 @ 21:18)    Gram Stain:  WBC^White Blood Cells QNTY CELLS IN GRAM STAIN: NO CELLS SEEN   NOS^No Organisms Seen    Specimen Source: PLEURAL FLUID    Culture - Blood (09.21.17 @ 02:01)  Staphylococcus epidermidis    Culture - Blood (09.21.17 @ 02:01)  STEP^Staphylococcus epidermidis    C Diff by PCR Result: NotDetec (09.20.17 @ 07:34)    Culture - Body Fluid with Gram Stain (09.17.17 @ 12:42)    Specimen Source: Pleural Fl Other, Pleural Fluid    Culture Results:   No growth to date.    Culture - Blood (09.18.17 @ 08:34)    Specimen Source: .Blood Blood    Culture Results:   No growth at 5 days.    Culture - Body Fluid with Gram Stain (09.17.17 @ 12:42)  Pleural Fl Other, Pleural Fluid  No growth to date.    Specimen Source: .Blood Blood-Venous (09.16.17 @ 15:30)  Culture Results:   No growth at 5 days. (09.16.17 @ 15:30)    Culture - Body Fluid with Gram Stain (09.16.17 @ 00:58)  .Body Fluid Pleural Fluid    Culture Results:   No growth at 5 days    Specimen Source: .Blood Blood-Venous (09.15.17 @ 07:47)  Negative    Culture - Respiratory with Gram Stain (08.20.17 @ 16:47)    Gram Stain Sputum:     Specimen Source: SPUTUM    Organism Identification: Stenotrophomonas maltophilia    Organism: Stenotrophomonas maltophilia    C Diff by PCR Result: NotDetec (09-20 @ 07:34)  Rapid RVP Result: NotDetec (09-19 @ 14:22)    C Diff by PCR Result: NotDetec (09-20 @ 07:34)    RADIOLOGY:  Xray Chest 1 View AP -PORTABLE-Routine (09.26.17 @ 06:39)   IMPRESSION:  Left IJ line and right chest tube unchanged.  No significant change in right mid and lower lung opacity likely related to loculated pleural fluid seen on the CT scan.  Continued bibasilar opacity which may be due to subsegmental atelectasis.    RADIOLOGY:  CT Abdomen and Pelvis w/ IV Cont (09.21.17 @ 12:50)   IMPRESSION:   Status post esophagectomy and gastric pull-through with adjacent extraluminal contrast consistent with known leak.  Tracheostomy tube in place.  Moderate sized pleural effusions with loculations.  Evidence of pericarditis with small to moderate-sized pericardial effusion.  J-tube in place.  Small volume ascites. Anasarca.    Xray Esophagram (09.19.17 @ 12:08)   IMPRESSION:  Status post [place]. Findings as described above consistent with a leak at the distal site just above the hemidiaphragm into the pleural space.       Transthoracic Echocardiogram (09.18.17 @ 10:15) >  CONCLUSIONS:  1. Mitral annular calcification and calcified mitral leaflets with normal diastolic opening.  2. Calcified aortic valve with decreased opening.  3. Low Normal left ventricular systolic function. EF 50%  4. The right ventricle is not well visualized; grossly normal right ventricular systolic function.  5. Thickened  pericardium with small  0.9cm circumferential pericardial effusion. There is evidence of raised intra pericardial pressure with RA buckling however no  tamponade seen.

## 2017-09-27 NOTE — PROGRESS NOTE ADULT - SUBJECTIVE AND OBJECTIVE BOX
MEJIA LATIF            MRN-[id number]         penicillin (Rash)             82 M presents with non-functioning J tube and SOB. Patient recently underwent a robotic-assisted [place] esophagogastrectomy with feeding J tube placement for esophageal adenocarcinoma (T1aN0) with Dr. Dyer and Dr. Christian Weinstein on 8/14/17. After surgery, patient was found to have an anastomotic leak and was treated conservatively with NPO, antibiotics, chest tube drainage, and J tube feeding. Patient was eventually discharged to rehab. On day of evaluation, patient's J tube was unable to be used, and was sent to the ED for evaluation. Patient also complains of SOB, but no chest pain or abdominal pain. Denies fever or chills. Patient was taking medications by mouth, but was only getting nutrition via his J tube. Esophagram done on 9/19/17 showing  a leak at the distal site just above the hemidiaphragm into the pleural space.   Patient was found to have a Left pleural effusion. Patent received pigtail and developed respiratory distress. Patient was transferred to the SICU where he was intubated. Patient also has  right GINA drain.  Patient is now being transferred to [facility]ICU for optimal management. (20 Sep 2017 23:45)      Procedure: [place] esophagectomy  POD# 44    Issues:  Pleural effusion  Esophageal leak  Seizure disorder  NSTEMI  Cough  Asthma  HLD    Home Medications:   * No Current Medications as of 20-Sep-2017 21:03 documented in Structured Notes  · 	aspirin-dipyridamole 25 mg-200 mg oral capsule, extended release: 1 cap(s) orally 2 times a day  · 	docusate sodium 100 mg oral capsule: 1 cap(s) orally 2 times a day  · 	ProAir HFA 90 mcg/inh inhalation aerosol: 2 puff(s) inhaled 4 times a day, As Needed   · 	Vitamin B-12 500 mcg oral tablet: 1 tab(s) orally once a day  · 	amLODIPine 5 mg oral tablet: 1 tab(s) orally once a day in pm  · 	simvastatin 40 mg oral tablet: 1 tab(s) orally once a day in pm  · 	tamsulosin 0.4 mg oral capsule: 1 cap(s) orally once a day in pm  · 	Zetia 10 mg oral tablet: 1 tab(s) orally once a day in pm  · 	ALPRAZolam 0.25 mg oral tablet: 2 tab(s) orally 2 times a day  · 	olmesartan 20 mg oral tablet: 1 tab(s) orally once a day in pm  · 	montelukast 10 mg oral tablet: 1 tab(s) orally once a day in pm  · 	Dilantin 100 mg oral capsule: 2 cap(s) orally 2 times a day  · 	predniSONE 5 mg oral tablet: 1 tab(s) orally once a day in am  · 	finasteride 5 mg oral tablet: 1 tab(s) orally once a day in pm  · 	folic acid 0.4 mg oral tablet: 1 tab(s) orally once a day  · 	Vitamin B6 100 mg oral tablet: 1 tab(s) orally once a day        PAST MEDICAL & SURGICAL HISTORY:  Coronary artery disease: s/p 3 stents on June 30, 2017 at [place] Dr. Lavelle Reid  OA (osteoarthritis) of knee: right  Former smoker, stopped smoking in distant past  Rheumatoid arthritis  Seizure disorder: 1 episode approximately 15 yrs ago  Asthma  Hyperlipidemia  BPH (benign prostatic hyperplasia)  HTN (hypertension)  Esophagus cancer  Chronic allergic rhinitis  BCC (basal cell carcinoma): skin  Cerebrovascular accident (CVA)  Anxiety  Anastomotic leak following esophagectomy: Vance trent esophagectomy  History of tonsillectomy  H/O heart artery stent: June 30, 2017  Knee arthropathy: left  History of total hip replacement: left  History of hernia repair        ICU Vital Signs Last 24 Hrs  T(C): 36.6 (27 Sep 2017 12:00), Max: 37.1 (26 Sep 2017 16:00)  T(F): 97.8 (27 Sep 2017 12:00), Max: 98.8 (26 Sep 2017 20:00)  HR: 88 (27 Sep 2017 13:00) (68 - 96)  BP: 149/51 (27 Sep 2017 12:00) (108/60 - 157/77)  BP(mean): 77 (27 Sep 2017 12:00) (68 - 101)  ABP: --  ABP(mean): --  RR: 20 (27 Sep 2017 13:00) (20 - 29)  SpO2: 100% (27 Sep 2017 13:00) (95% - 100%)    I&O's Summary    26 Sep 2017 07:01  -  27 Sep 2017 07:00  --------------------------------------------------------  IN: 2200 mL / OUT: 2820 mL / NET: -620 mL    27 Sep 2017 07:01  -  27 Sep 2017 14:23  --------------------------------------------------------  IN: 530 mL / OUT: 740 mL / NET: -210 mL      CAPILLARY BLOOD GLUCOSE  108 (27 Sep 2017 00:00)      MEDICATIONS  (STANDING):  cefepime  IVPB 1000 milliGRAM(s) IV Intermittent every 12 hours  metroNIDAZOLE  IVPB      metroNIDAZOLE  IVPB 500 milliGRAM(s) IV Intermittent every 8 hours  chlorhexidine 4% Liquid 1 Application(s) Topical daily  folic acid Injectable 1 milliGRAM(s) IV Push daily  pyridoxine Injectable 100 milliGRAM(s) IV Push daily  cyanocobalamin Injectable 500 MICROGram(s) SubCutaneous daily  pantoprazole  Injectable 40 milliGRAM(s) IV Push two times a day  methylPREDNISolone sodium succinate Injectable 20 milliGRAM(s) IV Push two times a day  micafungin IVPB 100 milliGRAM(s) IV Intermittent every 24 hours  micafungin IVPB      fluticasone propionate   220 MICROgram(s) HFA Inhaler 1 Puff(s) Inhalation two times a day  sodium chloride 0.9%. 1000 milliLiter(s) (10 mL/Hr) IV Continuous <Continuous>  aspirin Suppository 300 milliGRAM(s) Rectal daily  ALBUTerol/ipratropium for Nebulization 3 milliLiter(s) Nebulizer every 6 hours  dornase ziyad Solution 2.5 milliGRAM(s) Inhalation two times a day  vancomycin  IVPB 1000 milliGRAM(s) IV Intermittent every 24 hours  fosphenytoin IVPB 200 milliGRAM(s) PE IV Intermittent every 12 hours  metoprolol Injectable 2.5 milliGRAM(s) IV Push every 6 hours    MEDICATIONS  (PRN):  guaiFENesin    Syrup 200 milliGRAM(s) Oral every 6 hours PRN Cough  HYDROcodone/homatropine Syrup 5 milliLiter(s) Oral every 4 hours PRN Cough  melatonin 3 milliGRAM(s) Oral at bedtime PRN Insomnia  LORazepam   Injectable 0.25 milliGRAM(s) IV Push two times a day PRN Anxiety      Physical exam:                       General:               Pt is awake, following commands, on CPAP                                            Neuro:                  Moved all ext to commands                          Cardiovascular:      S1 & S2, regular                           Respiratory:         Air entry is fair and equal on both sides, has bilateral conducted sounds                           GI:                       Soft, nondistended and nontender                            Ext:                      No cyanosis, has edema               Labs:                                                                           9.2    8.62  )-----------( 179      ( 27 Sep 2017 03:40 )             28.9             09-27    138  |  104  |  16  ----------------------------<  106<H>  4.0   |  27  |  0.46<L>    Ca    8.3<L>      27 Sep 2017 03:40  Phos  2.6     09-27  Mg     1.7     09-27    TPro  5.9<L>  /  Alb  2.1<L>  /  TBili  0.3  /  DBili  x   /  AST  31  /  ALT  42<H>  /  AlkPhos  130<H>  09-26                  Culture - Body Fluid:   NO ORGANISMS ISOLATED AT 24 HOURS  NO ORGANISMS ISOLATED AT 48 HRS.  NO ORGANISMS ISOLATED AT 72 HRS.  NO GROWTH AFTER 4 DAYS INCUBATION  NO GROWTH AFTER 5 DAYS INCUBATION (09.21.17 @ 21:18)        CXR:  < from: Xray Chest 1 View AP-PORTABLE IMMEDIATE (09.27.17 @ 11:45) >  6:33 AM:  Left IJ line and mediastinal chest tube are present. Small to moderate   sized right pleural effusion is present. The left lung is clear. Mild   interstitial edema. No pneumothorax.    11:25 AM:  Since the last exam, right-sided pigtail pleural catheter has been   introduced and has evacuated the pleural effusion. Residual opacity in   the right midlung field probably atelectasis. Heart size is stable. Left   IJ line and mediastinal chest tube unchanged.      Plan:    General: 82yMale s/p  [place] esophagectomy  POD#40, admitted to [facility] for non-functioning J-tube, pleural effusion, got intubated while in SICU, transferred to [facility] for further treatment. EGD showed contained leak.                          Neuro:                                          Pain control with Fentanyl / Tylenol PRN     Seizure disorder - Continue Fosphenytoin                            Cardiovascular:                                          Continue hemodynamic monitoring.    CAD: On ASA, No Plavix. Continue lopressor                                Respiratory:                                         Asthma: Continue Solumedrol, Duonebs, pulmozume     Persistent cough - Consider bronch to r/o TEF                                          Loculated R-effusion - A new pigtail inserted & drained 200 cc serous fluid                                                                pulmonary toilet     R-Pigtail to suction                            GI                                         NPO     EGD - contained perforation of stomach & leak near anastomosis site                                          Continue GI prophylaxis with Protonix                                         Tolerating J-tube feeds 	                                                                 Renal:                                         Monitor I/Os and electrolytes                                         López                                                  Hem/ Onc:                                         Monitor chest tube output                            Infectious disease:                                            Continue Vanco / Cefipime / Flagyly / Micafungin                            Endocrine                                             Continue Accu-Checks with coverage.         Pertinent clinical, laboratory, radiographic, hemodynamic, echocardiographic, respiratory data, microbiologic data and chart were reviewed and analyzed frequently throughout the course of the day and night  Patient seen, examined and plan discussed with CT Surgeon Dr. Weinstein / CTICU team during rounds.    Pt's status discussed with family at bedside, updated status      Jose Sharif MD

## 2017-09-27 NOTE — PROGRESS NOTE ADULT - SUBJECTIVE AND OBJECTIVE BOX
D Team Surgery Progress Note    Patient is a 82y old  Male who presents with a chief complaint of feeding tube clogged s/p maryjane ford (21 Sep 2017 04:19)      SUBJECTIVE: Patient seen and examined on blue team morning rounds. No acute events overnight. Resting in bed. GINA in place with minimal clear output. 24 hour urine output was 2,550ml.      OBJECTIVE:     ** Physical Exam **  Gen: Alert, NAD  Abd: Soft, nontender, slightly distended.    ** Vital signs / I&O's **    Vital Signs Last 24 Hrs  T(C): 36.9 (27 Sep 2017 04:00), Max: 37.2 (26 Sep 2017 12:00)  T(F): 98.4 (27 Sep 2017 04:00), Max: 99 (26 Sep 2017 12:00)  HR: 74 (27 Sep 2017 07:00) (68 - 96)  BP: 118/66 (27 Sep 2017 07:00) (106/58 - 157/77)  BP(mean): 76 (27 Sep 2017 07:00) (65 - 101)  RR: 25 (27 Sep 2017 07:00) (17 - 29)  SpO2: 100% (27 Sep 2017 07:00) (95% - 100%)      26 Sep 2017 07:01  -  27 Sep 2017 07:00  --------------------------------------------------------  IN:    Enteral Tube Flush: 120 mL    IV PiggyBack: 400 mL    Jevity: 1200 mL    sodium chloride 0.9%.: 240 mL  Total IN: 1960 mL    OUT:    Drain: 15 mL    Indwelling Catheter - Urethral: 2805 mL  Total OUT: 2820 mL    Total NET: -860 mL            ** Labs **                          9.2    8.62  )-----------( 179      ( 27 Sep 2017 03:40 )             28.9     27 Sep 2017 03:40    138    |  104    |  16     ----------------------------<  106    4.0     |  27     |  0.46     Ca    8.3        27 Sep 2017 03:40  Phos  2.6       27 Sep 2017 03:40  Mg     1.7       27 Sep 2017 03:40    TPro  5.9    /  Alb  2.1    /  TBili  0.3    /  DBili  x      /  AST  31     /  ALT  42     /  AlkPhos  130    26 Sep 2017 03:00      CAPILLARY BLOOD GLUCOSE  108 (27 Sep 2017 00:00)            LIVER FUNCTIONS - ( 26 Sep 2017 03:00 )  Alb: 2.1 g/dL / Pro: 5.9 g/dL / ALK PHOS: 130 u/L / ALT: 42 u/L / AST: 31 u/L / GGT: x               MEDICATIONS  (STANDING):  cefepime  IVPB 1000 milliGRAM(s) IV Intermittent every 12 hours  metroNIDAZOLE  IVPB      metroNIDAZOLE  IVPB 500 milliGRAM(s) IV Intermittent every 8 hours  chlorhexidine 4% Liquid 1 Application(s) Topical daily  folic acid Injectable 1 milliGRAM(s) IV Push daily  pyridoxine Injectable 100 milliGRAM(s) IV Push daily  cyanocobalamin Injectable 500 MICROGram(s) SubCutaneous daily  pantoprazole  Injectable 40 milliGRAM(s) IV Push two times a day  methylPREDNISolone sodium succinate Injectable 20 milliGRAM(s) IV Push two times a day  micafungin IVPB 100 milliGRAM(s) IV Intermittent every 24 hours  micafungin IVPB      fluticasone propionate   220 MICROgram(s) HFA Inhaler 1 Puff(s) Inhalation two times a day  sodium chloride 0.9%. 1000 milliLiter(s) (10 mL/Hr) IV Continuous <Continuous>  aspirin Suppository 300 milliGRAM(s) Rectal daily  ALBUTerol/ipratropium for Nebulization 3 milliLiter(s) Nebulizer every 6 hours  dornase ziyad Solution 2.5 milliGRAM(s) Inhalation two times a day  vancomycin  IVPB 1000 milliGRAM(s) IV Intermittent every 24 hours  fosphenytoin IVPB 200 milliGRAM(s) PE IV Intermittent every 12 hours  metoprolol Injectable 2.5 milliGRAM(s) IV Push every 6 hours    MEDICATIONS  (PRN):  guaiFENesin    Syrup 200 milliGRAM(s) Oral every 6 hours PRN Cough  HYDROcodone/homatropine Syrup 5 milliLiter(s) Oral every 4 hours PRN Cough  melatonin 3 milliGRAM(s) Oral at bedtime PRN Insomnia  LORazepam   Injectable 0.25 milliGRAM(s) IV Push two times a day PRN Anxiety

## 2017-09-27 NOTE — PROGRESS NOTE ADULT - SUBJECTIVE AND OBJECTIVE BOX
MEJIA LATIF:[id number],   82yMale followed for:  penicillin (Rash)    PAST MEDICAL & SURGICAL HISTORY:  Coronary artery disease: s/p 3 stents on June 30, 2017 at [place] Dr. Lavelle Reid  OA (osteoarthritis) of knee: right  Former smoker, stopped smoking in distant past  Rheumatoid arthritis  Seizure disorder: 1 episode approximately 15 yrs ago  Asthma  Hyperlipidemia  BPH (benign prostatic hyperplasia)  HTN (hypertension)  Esophagus cancer  Chronic allergic rhinitis  BCC (basal cell carcinoma): skin  Cerebrovascular accident (CVA)  Anxiety  Anastomotic leak following esophagectomy: [facility] esophagectomy  History of tonsillectomy  H/O heart artery stent: June 30, 2017  Knee arthropathy: left  History of total hip replacement: left  History of hernia repair    FAMILY HISTORY:  Family history of heart attack (Sibling)  Family history of pulmonary embolism: father @ 49 yr old  FH: CAD (coronary artery disease)    MEDICATIONS  (STANDING):  cefepime  IVPB 1000 milliGRAM(s) IV Intermittent every 12 hours  metroNIDAZOLE  IVPB      metroNIDAZOLE  IVPB 500 milliGRAM(s) IV Intermittent every 8 hours  chlorhexidine 4% Liquid 1 Application(s) Topical daily  folic acid Injectable 1 milliGRAM(s) IV Push daily  pyridoxine Injectable 100 milliGRAM(s) IV Push daily  cyanocobalamin Injectable 500 MICROGram(s) SubCutaneous daily  pantoprazole  Injectable 40 milliGRAM(s) IV Push two times a day  methylPREDNISolone sodium succinate Injectable 20 milliGRAM(s) IV Push two times a day  micafungin IVPB 100 milliGRAM(s) IV Intermittent every 24 hours  micafungin IVPB      fluticasone propionate   220 MICROgram(s) HFA Inhaler 1 Puff(s) Inhalation two times a day  sodium chloride 0.9%. 1000 milliLiter(s) (10 mL/Hr) IV Continuous <Continuous>  aspirin Suppository 300 milliGRAM(s) Rectal daily  ALBUTerol/ipratropium for Nebulization 3 milliLiter(s) Nebulizer every 6 hours  dornase ziyad Solution 2.5 milliGRAM(s) Inhalation two times a day  vancomycin  IVPB 1000 milliGRAM(s) IV Intermittent every 24 hours  fosphenytoin IVPB 200 milliGRAM(s) PE IV Intermittent every 12 hours  metoprolol Injectable 2.5 milliGRAM(s) IV Push every 6 hours    MEDICATIONS  (PRN):  guaiFENesin    Syrup 200 milliGRAM(s) Oral every 6 hours PRN Cough  HYDROcodone/homatropine Syrup 5 milliLiter(s) Oral every 4 hours PRN Cough  melatonin 3 milliGRAM(s) Oral at bedtime PRN Insomnia  LORazepam   Injectable 0.25 milliGRAM(s) IV Push two times a day PRN Anxiety      Vital Signs Last 24 Hrs  T(C): 36.4 (27 Sep 2017 08:00), Max: 37.2 (26 Sep 2017 12:00)  T(F): 97.6 (27 Sep 2017 08:00), Max: 99 (26 Sep 2017 12:00)  HR: 83 (27 Sep 2017 11:00) (68 - 96)  BP: 115/61 (27 Sep 2017 11:00) (108/60 - 157/77)  BP(mean): 80 (27 Sep 2017 11:00) (68 - 101)  RR: 23 (27 Sep 2017 11:00) (20 - 29)  SpO2: 97% (27 Sep 2017 11:00) (95% - 100%)  nc/at  s1s2  cta  soft, nt, nd no guarding or rebound  no c/c/e    CBC Full  -  ( 27 Sep 2017 03:40 )  WBC Count : 8.62 K/uL  Hemoglobin : 9.2 g/dL  Hematocrit : 28.9 %  Platelet Count - Automated : 179 K/uL  Mean Cell Volume : 94.4 fL  Mean Cell Hemoglobin : 30.1 pg  Mean Cell Hemoglobin Concentration : 31.8 %  Auto Neutrophil # : x  Auto Lymphocyte # : x  Auto Monocyte # : x  Auto Eosinophil # : x  Auto Basophil # : x  Auto Neutrophil % : x  Auto Lymphocyte % : x  Auto Monocyte % : x  Auto Eosinophil % : x  Auto Basophil % : x    09-27    138  |  104  |  16  ----------------------------<  106<H>  4.0   |  27  |  0.46<L>    Ca    8.3<L>      27 Sep 2017 03:40  Phos  2.6     09-27  Mg     1.7     09-27    TPro  5.9<L>  /  Alb  2.1<L>  /  TBili  0.3  /  DBili  x   /  AST  31  /  ALT  42<H>  /  AlkPhos  130<H>  09-26

## 2017-09-27 NOTE — PROGRESS NOTE ADULT - ASSESSMENT
82M CAD/stent, OA, RA on steroids, seizures, chol, asthma, htn, CVA, esophageal ca s/p chemo/RT followed by [facility]-T[facility] esophagogastrectomy 8/14/17 with feeding J tube. 8/21/17, diagnosed with anastomotic leak s/p IV antibiotics (8/20/17 - 8/23/17), NPO - J-tube feeds.  Here with SOB likely due to persistent leak and b/l effusions.  On empiric antimicrobials (since 9/15-) including antifungal therapy since (9/20-). CT with loculated R pleural effusion, L pleural effusion and pericardial effusion/pericarditis.  All pleural fluid with negative cultures.  9/21 s/p EGD:   Perforation in esophagus at approximately 28cm from incisors; Perforation in stomach at approximately 40cm from incisors.  Also, several BC with CNS - the last 2 sets with CNS but were drawn from central line (after it was newly placed). CNS in blood are likely contaminants.  Controlled leak with drainage of R chest. Also, +NSTEMI    Per Thoracic - conservative management for now  Empiric vanc/cefepime/flagyl/micafungin - Days 13/13/13/7  can increase vancomycin to 750mg q12  c/w 2-4 weeks antibiotics from 9/15 though all cultures negative  no role for prophylactic or suppressive antibiotics    d/w CTICU intensitivist

## 2017-09-28 LAB
BLD GP AB SCN SERPL QL: NEGATIVE — SIGNIFICANT CHANGE UP
BUN SERPL-MCNC: 16 MG/DL — SIGNIFICANT CHANGE UP (ref 7–23)
CALCIUM SERPL-MCNC: 8 MG/DL — LOW (ref 8.4–10.5)
CARDIOLIPIN IGM SER-MCNC: 27.34 GPL — HIGH (ref 0–23)
CARDIOLIPIN IGM SER-MCNC: 44.27 MPL — HIGH (ref 0–11)
CHLORIDE SERPL-SCNC: 102 MMOL/L — SIGNIFICANT CHANGE UP (ref 98–107)
CO2 SERPL-SCNC: 29 MMOL/L — SIGNIFICANT CHANGE UP (ref 22–31)
CREAT SERPL-MCNC: 0.47 MG/DL — LOW (ref 0.5–1.3)
GLUCOSE SERPL-MCNC: 150 MG/DL — HIGH (ref 70–99)
HCT VFR BLD CALC: 28.3 % — LOW (ref 39–50)
HGB BLD-MCNC: 9 G/DL — LOW (ref 13–17)
MAGNESIUM SERPL-MCNC: 1.9 MG/DL — SIGNIFICANT CHANGE UP (ref 1.6–2.6)
MCHC RBC-ENTMCNC: 29.9 PG — SIGNIFICANT CHANGE UP (ref 27–34)
MCHC RBC-ENTMCNC: 31.8 % — LOW (ref 32–36)
MCV RBC AUTO: 94 FL — SIGNIFICANT CHANGE UP (ref 80–100)
NRBC # FLD: 0 — SIGNIFICANT CHANGE UP
PHOSPHATE SERPL-MCNC: 2.7 MG/DL — SIGNIFICANT CHANGE UP (ref 2.5–4.5)
PLATELET # BLD AUTO: 183 K/UL — SIGNIFICANT CHANGE UP (ref 150–400)
PMV BLD: 9.7 FL — SIGNIFICANT CHANGE UP (ref 7–13)
POTASSIUM SERPL-MCNC: 4 MMOL/L — SIGNIFICANT CHANGE UP (ref 3.5–5.3)
POTASSIUM SERPL-SCNC: 4 MMOL/L — SIGNIFICANT CHANGE UP (ref 3.5–5.3)
PREALB SERPL-MCNC: 18 MG/DL — LOW (ref 20–40)
RBC # BLD: 3.01 M/UL — LOW (ref 4.2–5.8)
RBC # FLD: 14.9 % — HIGH (ref 10.3–14.5)
RH IG SCN BLD-IMP: POSITIVE — SIGNIFICANT CHANGE UP
SODIUM SERPL-SCNC: 137 MMOL/L — SIGNIFICANT CHANGE UP (ref 135–145)
WBC # BLD: 8.35 K/UL — SIGNIFICANT CHANGE UP (ref 3.8–10.5)
WBC # FLD AUTO: 8.35 K/UL — SIGNIFICANT CHANGE UP (ref 3.8–10.5)

## 2017-09-28 PROCEDURE — 99232 SBSQ HOSP IP/OBS MODERATE 35: CPT

## 2017-09-28 PROCEDURE — 99233 SBSQ HOSP IP/OBS HIGH 50: CPT

## 2017-09-28 PROCEDURE — 71010: CPT | Mod: 26

## 2017-09-28 RX ORDER — CHLORHEXIDINE GLUCONATE 213 G/1000ML
1 SOLUTION TOPICAL DAILY
Qty: 0 | Refills: 0 | Status: DISCONTINUED | OUTPATIENT
Start: 2017-09-28 | End: 2017-10-02

## 2017-09-28 RX ORDER — SODIUM CHLORIDE 9 MG/ML
4 INJECTION INTRAMUSCULAR; INTRAVENOUS; SUBCUTANEOUS EVERY 6 HOURS
Qty: 0 | Refills: 0 | Status: DISCONTINUED | OUTPATIENT
Start: 2017-09-28 | End: 2017-10-04

## 2017-09-28 RX ADMIN — Medication 3 MILLILITER(S): at 16:13

## 2017-09-28 RX ADMIN — CEFEPIME 100 MILLIGRAM(S): 1 INJECTION, POWDER, FOR SOLUTION INTRAMUSCULAR; INTRAVENOUS at 05:31

## 2017-09-28 RX ADMIN — PANTOPRAZOLE SODIUM 40 MILLIGRAM(S): 20 TABLET, DELAYED RELEASE ORAL at 19:24

## 2017-09-28 RX ADMIN — FOSPHENYTOIN 108 MILLIGRAM(S) PE: 50 INJECTION INTRAMUSCULAR; INTRAVENOUS at 05:31

## 2017-09-28 RX ADMIN — NYSTATIN CREAM 1 APPLICATION(S): 100000 CREAM TOPICAL at 05:32

## 2017-09-28 RX ADMIN — Medication 0.25 MILLIGRAM(S): at 21:04

## 2017-09-28 RX ADMIN — Medication 20 MILLIGRAM(S): at 19:24

## 2017-09-28 RX ADMIN — Medication 250 MILLIGRAM(S): at 03:04

## 2017-09-28 RX ADMIN — Medication 3 MILLILITER(S): at 04:21

## 2017-09-28 RX ADMIN — PANTOPRAZOLE SODIUM 40 MILLIGRAM(S): 20 TABLET, DELAYED RELEASE ORAL at 05:31

## 2017-09-28 RX ADMIN — Medication 1 PUFF(S): at 22:00

## 2017-09-28 RX ADMIN — Medication 2.5 MILLIGRAM(S): at 19:24

## 2017-09-28 RX ADMIN — Medication 1 PUFF(S): at 09:33

## 2017-09-28 RX ADMIN — Medication 3 MILLILITER(S): at 21:56

## 2017-09-28 RX ADMIN — DORNASE ALFA 2.5 MILLIGRAM(S): 1 SOLUTION RESPIRATORY (INHALATION) at 09:30

## 2017-09-28 RX ADMIN — NYSTATIN CREAM 1 APPLICATION(S): 100000 CREAM TOPICAL at 19:28

## 2017-09-28 RX ADMIN — Medication 2.5 MILLIGRAM(S): at 23:37

## 2017-09-28 RX ADMIN — Medication 20 MILLIGRAM(S): at 05:31

## 2017-09-28 RX ADMIN — Medication 100 MILLIGRAM(S): at 14:49

## 2017-09-28 RX ADMIN — FOSPHENYTOIN 108 MILLIGRAM(S) PE: 50 INJECTION INTRAMUSCULAR; INTRAVENOUS at 19:23

## 2017-09-28 RX ADMIN — DORNASE ALFA 2.5 MILLIGRAM(S): 1 SOLUTION RESPIRATORY (INHALATION) at 21:59

## 2017-09-28 RX ADMIN — Medication 1 MILLIGRAM(S): at 14:40

## 2017-09-28 RX ADMIN — SODIUM CHLORIDE 10 MILLILITER(S): 9 INJECTION INTRAMUSCULAR; INTRAVENOUS; SUBCUTANEOUS at 19:59

## 2017-09-28 RX ADMIN — Medication 100 MILLIGRAM(S): at 05:31

## 2017-09-28 RX ADMIN — CHLORHEXIDINE GLUCONATE 1 APPLICATION(S): 213 SOLUTION TOPICAL at 14:40

## 2017-09-28 RX ADMIN — PREGABALIN 500 MICROGRAM(S): 225 CAPSULE ORAL at 14:52

## 2017-09-28 RX ADMIN — Medication 300 MILLIGRAM(S): at 14:54

## 2017-09-28 RX ADMIN — Medication 3 MILLILITER(S): at 09:30

## 2017-09-28 NOTE — PROGRESS NOTE ADULT - SUBJECTIVE AND OBJECTIVE BOX
ACS Progress Note    S: Patient seen and examined. No acute events overnight. Pain well controlled with current regimen. Denies nausea/vomiting.       O:  Vital Signs Last 24 Hrs  T(C): 36.4 (28 Sep 2017 04:00), Max: 36.7 (27 Sep 2017 20:00)  T(F): 97.6 (28 Sep 2017 04:00), Max: 98.1 (27 Sep 2017 20:00)  HR: 83 (28 Sep 2017 07:00) (69 - 93)  BP: 137/63 (28 Sep 2017 07:00) (107/57 - 149/51)  BP(mean): 83 (28 Sep 2017 07:00) (68 - 91)  RR: 26 (28 Sep 2017 07:00) (20 - 27)  SpO2: 100% (28 Sep 2017 07:00) (97% - 100%)    I&O's Detail    27 Sep 2017 07:01  -  28 Sep 2017 07:00  --------------------------------------------------------  IN:    Enteral Tube Flush: 110 mL    IV PiggyBack: 850 mL    Jevity: 1440 mL    sodium chloride 0.9%.: 240 mL  Total IN: 2640 mL    OUT:    Drain: 10 mL    Drain: 180 mL    Indwelling Catheter - Urethral: 1675 mL  Total OUT: 1865 mL    Total NET: 775 mL          MEDICATIONS  (STANDING):  cefepime  IVPB 1000 milliGRAM(s) IV Intermittent every 12 hours  metroNIDAZOLE  IVPB      metroNIDAZOLE  IVPB 500 milliGRAM(s) IV Intermittent every 8 hours  chlorhexidine 4% Liquid 1 Application(s) Topical daily  folic acid Injectable 1 milliGRAM(s) IV Push daily  pyridoxine Injectable 100 milliGRAM(s) IV Push daily  cyanocobalamin Injectable 500 MICROGram(s) SubCutaneous daily  pantoprazole  Injectable 40 milliGRAM(s) IV Push two times a day  methylPREDNISolone sodium succinate Injectable 20 milliGRAM(s) IV Push two times a day  micafungin IVPB 100 milliGRAM(s) IV Intermittent every 24 hours  micafungin IVPB      fluticasone propionate   220 MICROgram(s) HFA Inhaler 1 Puff(s) Inhalation two times a day  sodium chloride 0.9%. 1000 milliLiter(s) (10 mL/Hr) IV Continuous <Continuous>  aspirin Suppository 300 milliGRAM(s) Rectal daily  ALBUTerol/ipratropium for Nebulization 3 milliLiter(s) Nebulizer every 6 hours  dornase ziyad Solution 2.5 milliGRAM(s) Inhalation two times a day  vancomycin  IVPB 1000 milliGRAM(s) IV Intermittent every 24 hours  fosphenytoin IVPB 200 milliGRAM(s) PE IV Intermittent every 12 hours  metoprolol Injectable 2.5 milliGRAM(s) IV Push every 6 hours  nystatin Powder 1 Application(s) Topical two times a day    MEDICATIONS  (PRN):  guaiFENesin    Syrup 200 milliGRAM(s) Oral every 6 hours PRN Cough  HYDROcodone/homatropine Syrup 5 milliLiter(s) Oral every 4 hours PRN Cough  melatonin 3 milliGRAM(s) Oral at bedtime PRN Insomnia  LORazepam   Injectable 0.25 milliGRAM(s) IV Push two times a day PRN Anxiety                            9.0    8.35  )-----------( 183      ( 28 Sep 2017 04:20 )             28.3       09-28    137  |  102  |  16  ----------------------------<  150<H>  4.0   |  29  |  0.47<L>    Ca    8.0<L>      28 Sep 2017 04:20  Phos  2.7     09-28  Mg     1.9     09-28        Physical Exam:  Gen: Laying in bed, NAD, alert and oriented.   Resp: Unlabored breathing  Chest: Pigtail drain in R chest w/ serosanguinous output  Abd: soft, appropriately tender, non-distended. GINA with minimal serous output. Incision and j-tube site c/d/i.

## 2017-09-28 NOTE — PROGRESS NOTE ADULT - ASSESSMENT
82M esophageal ca s/p chemo/RT followed by [facility]-T[facility] esophagogastrectomy with feeding J tube. Complicated by leak managed conservatively.    PLAN:  - J-tube feeds at goal  - Continue abx  - Monitor respiratory status  - Care per CTICU    Kamlesh Ramsay, PGY-1  D Team Surgery [id number]

## 2017-09-28 NOTE — PROGRESS NOTE ADULT - SUBJECTIVE AND OBJECTIVE BOX
MEJIA LATIF:[id number],   82yMale followed for: leak, esophageal ca  penicillin (Rash)    PAST MEDICAL & SURGICAL HISTORY:  Coronary artery disease: s/p 3 stents on June 30, 2017 at [place] Dr. Lavelle Reid  OA (osteoarthritis) of knee: right  Former smoker, stopped smoking in distant past  Rheumatoid arthritis  Seizure disorder: 1 episode approximately 15 yrs ago  Asthma  Hyperlipidemia  BPH (benign prostatic hyperplasia)  HTN (hypertension)  Esophagus cancer  Chronic allergic rhinitis  BCC (basal cell carcinoma): skin  Cerebrovascular accident (CVA)  Anxiety  Anastomotic leak following esophagectomy: [facility] esophagectomy  History of tonsillectomy  H/O heart artery stent: June 30, 2017  Knee arthropathy: left  History of total hip replacement: left  History of hernia repair    FAMILY HISTORY:  Family history of heart attack (Sibling)  Family history of pulmonary embolism: father @ 49 yr old  FH: CAD (coronary artery disease)    MEDICATIONS  (STANDING):  cefepime  IVPB 1000 milliGRAM(s) IV Intermittent every 12 hours  metroNIDAZOLE  IVPB      metroNIDAZOLE  IVPB 500 milliGRAM(s) IV Intermittent every 8 hours  chlorhexidine 4% Liquid 1 Application(s) Topical daily  folic acid Injectable 1 milliGRAM(s) IV Push daily  pyridoxine Injectable 100 milliGRAM(s) IV Push daily  cyanocobalamin Injectable 500 MICROGram(s) SubCutaneous daily  pantoprazole  Injectable 40 milliGRAM(s) IV Push two times a day  methylPREDNISolone sodium succinate Injectable 20 milliGRAM(s) IV Push two times a day  micafungin IVPB 100 milliGRAM(s) IV Intermittent every 24 hours  micafungin IVPB      fluticasone propionate   220 MICROgram(s) HFA Inhaler 1 Puff(s) Inhalation two times a day  sodium chloride 0.9%. 1000 milliLiter(s) (10 mL/Hr) IV Continuous <Continuous>  aspirin Suppository 300 milliGRAM(s) Rectal daily  ALBUTerol/ipratropium for Nebulization 3 milliLiter(s) Nebulizer every 6 hours  dornase ziyad Solution 2.5 milliGRAM(s) Inhalation two times a day  vancomycin  IVPB 1000 milliGRAM(s) IV Intermittent every 24 hours  fosphenytoin IVPB 200 milliGRAM(s) PE IV Intermittent every 12 hours  metoprolol Injectable 2.5 milliGRAM(s) IV Push every 6 hours  nystatin Powder 1 Application(s) Topical two times a day    MEDICATIONS  (PRN):  guaiFENesin    Syrup 200 milliGRAM(s) Oral every 6 hours PRN Cough  HYDROcodone/homatropine Syrup 5 milliLiter(s) Oral every 4 hours PRN Cough  melatonin 3 milliGRAM(s) Oral at bedtime PRN Insomnia  LORazepam   Injectable 0.25 milliGRAM(s) IV Push two times a day PRN Anxiety      Vital Signs Last 24 Hrs  T(C): 36.4 (28 Sep 2017 04:00), Max: 36.7 (27 Sep 2017 20:00)  T(F): 97.6 (28 Sep 2017 04:00), Max: 98.1 (27 Sep 2017 20:00)  HR: 83 (28 Sep 2017 07:00) (69 - 93)  BP: 137/63 (28 Sep 2017 07:00) (107/57 - 149/51)  BP(mean): 83 (28 Sep 2017 07:00) (68 - 91)  RR: 26 (28 Sep 2017 07:00) (20 - 27)  SpO2: 100% (28 Sep 2017 07:00) (97% - 100%)  nc/at  s1s2  cta  soft, nt, nd no guarding or rebound  no c/c/e    CBC Full  -  ( 28 Sep 2017 04:20 )  WBC Count : 8.35 K/uL  Hemoglobin : 9.0 g/dL  Hematocrit : 28.3 %  Platelet Count - Automated : 183 K/uL  Mean Cell Volume : 94.0 fL  Mean Cell Hemoglobin : 29.9 pg  Mean Cell Hemoglobin Concentration : 31.8 %  Auto Neutrophil # : x  Auto Lymphocyte # : x  Auto Monocyte # : x  Auto Eosinophil # : x  Auto Basophil # : x  Auto Neutrophil % : x  Auto Lymphocyte % : x  Auto Monocyte % : x  Auto Eosinophil % : x  Auto Basophil % : x    09-28    137  |  102  |  16  ----------------------------<  150<H>  4.0   |  29  |  0.47<L>    Ca    8.0<L>      28 Sep 2017 04:20  Phos  2.7     09-28  Mg     1.9     09-28

## 2017-09-29 LAB
BUN SERPL-MCNC: 15 MG/DL — SIGNIFICANT CHANGE UP (ref 7–23)
CALCIUM SERPL-MCNC: 8.5 MG/DL — SIGNIFICANT CHANGE UP (ref 8.4–10.5)
CHLORIDE SERPL-SCNC: 100 MMOL/L — SIGNIFICANT CHANGE UP (ref 98–107)
CO2 SERPL-SCNC: 27 MMOL/L — SIGNIFICANT CHANGE UP (ref 22–31)
CREAT SERPL-MCNC: 0.43 MG/DL — LOW (ref 0.5–1.3)
GLUCOSE SERPL-MCNC: 121 MG/DL — HIGH (ref 70–99)
HCT VFR BLD CALC: 31.3 % — LOW (ref 39–50)
HGB BLD-MCNC: 9.9 G/DL — LOW (ref 13–17)
MAGNESIUM SERPL-MCNC: 1.9 MG/DL — SIGNIFICANT CHANGE UP (ref 1.6–2.6)
MCHC RBC-ENTMCNC: 29.4 PG — SIGNIFICANT CHANGE UP (ref 27–34)
MCHC RBC-ENTMCNC: 31.6 % — LOW (ref 32–36)
MCV RBC AUTO: 92.9 FL — SIGNIFICANT CHANGE UP (ref 80–100)
NRBC # FLD: 0 — SIGNIFICANT CHANGE UP
PHOSPHATE SERPL-MCNC: 2.8 MG/DL — SIGNIFICANT CHANGE UP (ref 2.5–4.5)
PLATELET # BLD AUTO: 211 K/UL — SIGNIFICANT CHANGE UP (ref 150–400)
PMV BLD: 9.7 FL — SIGNIFICANT CHANGE UP (ref 7–13)
POTASSIUM SERPL-MCNC: 4.2 MMOL/L — SIGNIFICANT CHANGE UP (ref 3.5–5.3)
POTASSIUM SERPL-SCNC: 4.2 MMOL/L — SIGNIFICANT CHANGE UP (ref 3.5–5.3)
RBC # BLD: 3.37 M/UL — LOW (ref 4.2–5.8)
RBC # FLD: 14.7 % — HIGH (ref 10.3–14.5)
SODIUM SERPL-SCNC: 134 MMOL/L — LOW (ref 135–145)
WBC # BLD: 8.31 K/UL — SIGNIFICANT CHANGE UP (ref 3.8–10.5)
WBC # FLD AUTO: 8.31 K/UL — SIGNIFICANT CHANGE UP (ref 3.8–10.5)

## 2017-09-29 PROCEDURE — 99232 SBSQ HOSP IP/OBS MODERATE 35: CPT

## 2017-09-29 PROCEDURE — 93010 ELECTROCARDIOGRAM REPORT: CPT

## 2017-09-29 PROCEDURE — 99233 SBSQ HOSP IP/OBS HIGH 50: CPT

## 2017-09-29 PROCEDURE — 71010: CPT | Mod: 26

## 2017-09-29 RX ORDER — MAGNESIUM SULFATE 500 MG/ML
2 VIAL (ML) INJECTION ONCE
Qty: 0 | Refills: 0 | Status: COMPLETED | OUTPATIENT
Start: 2017-09-29 | End: 2017-09-29

## 2017-09-29 RX ADMIN — NYSTATIN CREAM 1 APPLICATION(S): 100000 CREAM TOPICAL at 05:07

## 2017-09-29 RX ADMIN — NYSTATIN CREAM 1 APPLICATION(S): 100000 CREAM TOPICAL at 17:52

## 2017-09-29 RX ADMIN — PANTOPRAZOLE SODIUM 40 MILLIGRAM(S): 20 TABLET, DELAYED RELEASE ORAL at 05:08

## 2017-09-29 RX ADMIN — SODIUM CHLORIDE 10 MILLILITER(S): 9 INJECTION INTRAMUSCULAR; INTRAVENOUS; SUBCUTANEOUS at 07:41

## 2017-09-29 RX ADMIN — DORNASE ALFA 2.5 MILLIGRAM(S): 1 SOLUTION RESPIRATORY (INHALATION) at 09:40

## 2017-09-29 RX ADMIN — Medication 20 MILLIGRAM(S): at 05:07

## 2017-09-29 RX ADMIN — Medication 20 MILLIGRAM(S): at 17:51

## 2017-09-29 RX ADMIN — PREGABALIN 500 MICROGRAM(S): 225 CAPSULE ORAL at 13:06

## 2017-09-29 RX ADMIN — Medication 3 MILLILITER(S): at 09:37

## 2017-09-29 RX ADMIN — Medication 1 PUFF(S): at 10:07

## 2017-09-29 RX ADMIN — Medication 3 MILLILITER(S): at 05:24

## 2017-09-29 RX ADMIN — FOSPHENYTOIN 108 MILLIGRAM(S) PE: 50 INJECTION INTRAMUSCULAR; INTRAVENOUS at 05:05

## 2017-09-29 RX ADMIN — Medication 2.5 MILLIGRAM(S): at 05:07

## 2017-09-29 RX ADMIN — Medication 2.5 MILLIGRAM(S): at 18:47

## 2017-09-29 RX ADMIN — SODIUM CHLORIDE 10 MILLILITER(S): 9 INJECTION INTRAMUSCULAR; INTRAVENOUS; SUBCUTANEOUS at 05:05

## 2017-09-29 RX ADMIN — Medication 1 MILLIGRAM(S): at 13:06

## 2017-09-29 RX ADMIN — Medication 3 MILLILITER(S): at 21:51

## 2017-09-29 RX ADMIN — Medication 100 MILLIGRAM(S): at 13:13

## 2017-09-29 RX ADMIN — Medication 300 MILLIGRAM(S): at 13:06

## 2017-09-29 RX ADMIN — Medication 1 PUFF(S): at 22:13

## 2017-09-29 RX ADMIN — Medication 3 MILLILITER(S): at 15:51

## 2017-09-29 RX ADMIN — Medication 2.5 MILLIGRAM(S): at 13:06

## 2017-09-29 RX ADMIN — PANTOPRAZOLE SODIUM 40 MILLIGRAM(S): 20 TABLET, DELAYED RELEASE ORAL at 17:52

## 2017-09-29 RX ADMIN — FOSPHENYTOIN 108 MILLIGRAM(S) PE: 50 INJECTION INTRAMUSCULAR; INTRAVENOUS at 17:51

## 2017-09-29 RX ADMIN — DORNASE ALFA 2.5 MILLIGRAM(S): 1 SOLUTION RESPIRATORY (INHALATION) at 21:51

## 2017-09-29 RX ADMIN — Medication 50 GRAM(S): at 07:40

## 2017-09-29 RX ADMIN — Medication 63.75 MILLIMOLE(S): at 07:41

## 2017-09-29 NOTE — PROGRESS NOTE ADULT - ASSESSMENT
82M CAD/stent, OA, RA on steroids, seizures, chol, asthma, htn, CVA, esophageal ca s/p chemo/RT followed by [facility]-Trent esophagogastrectomy 8/14/17 with feeding J tube. 8/21/17, diagnosed with anastomotic leak s/p IV antibiotics (8/20/17 - 8/23/17), NPO - J-tube feeds.  Here with SOB likely due to persistent leak and b/l effusions.  On empiric antimicrobials (since 9/15-) including antifungal therapy since (9/20-). CT with loculated R pleural effusion, L pleural effusion and pericardial effusion/pericarditis.  All pleural fluid with negative cultures.  9/21 s/p EGD:   Perforation in esophagus at approximately 28cm from incisors; Perforation in stomach at approximately 40cm from incisors.  Also, several BC with CNS - the last 2 sets with CNS but were drawn from central line (after it was newly placed). CNS in blood are likely contaminants.  Controlled leak with drainage of R chest. Also, +NSTEMI.  Was empirically treated with 2 weeks IV antimicrobials despite negative pleural cultures.    Per Thoracic - conservative management for now  Empiric vanc/cefepime/flagyl/micafungin - completed (finished 2 weeks)  no role for prophylactic or suppressive antibiotics    Please call Infectious Diseases if there is a change in status.  Thank you.  [phone].    I will be away at [facility].  ID service to cover starting tomorrow if called.

## 2017-09-29 NOTE — PROGRESS NOTE ADULT - SUBJECTIVE AND OBJECTIVE BOX
Follow Up:  anastomotic leak, loculated pleural effusion    Interval History/ROS:  transferred from CTICU to  today.  Feels much better today.  Still with CT.  Tired.  All antimicrobials stopped by thoracic surgery.   Rest of ROS otherwise negative.    Allergies  penicillin (Rash)    ANTIMICROBIALS:    cefepime  IVPB 1000 every 12 hours - started 9/15-28  vancomycin  IVPB 750 every 12 hours - started 9/15-28  metroNIDAZOLE  IVPB 500 every 8 hours - started 9/15-28  micafungin IVPB - started 9/21-28    MEDICATIONS  (STANDING):  pantoprazole  Injectable 40 two times a day  methylPREDNISolone sodium succinate Injectable 20 two times a day  fluticasone propionate   220 MICROgram(s) HFA Inhaler 1 two times a day  aspirin Suppository 300 daily  ALBUTerol/ipratropium for Nebulization 3 every 6 hours  dornase ziyad Solution 2.5 two times a day  melatonin 3 at bedtime PRN  fosphenytoin IVPB 200 every 12 hours  metoprolol Injectable 2.5 every 6 hours    Vital Signs Last 24 Hrs  T(F): 97.6 (09-29-17 @ 16:13), Max: 98.3 (09-28-17 @ 20:00)  HR: 83 (09-29-17 @ 16:13)  BP: 134/62 (09-29-17 @ 16:13)  RR: 18 (09-29-17 @ 16:13)  SpO2: 95% (09-29-17 @ 16:13) (95% - 100%)  Wt(kg): --    PHYSICAL EXAM:  family at bedside  General: non-toxic, sitting up in chair  HEAD/EYES: anicteric, PERRL  ENT:  supple  Cardiovascular:   S1, S2  Respiratory:  clear bilaterally right chest pigtail; R CT  GI:  soft, non-tender, normal bowel sounds; J-tube  :  no CVA tenderness   Musculoskeletal:  no synovitis  Neurologic:  grossly non-focal  Skin:  b/l pitting edema both LE  Lymph: no lymphadenopathy  Psychiatric:  appropriate affect  Vascular:  no phlebitis                   9.9    8.31  )-----------( 211      ( 29 Sep 2017 03:20 )             31.3 09-29    134  |  100  |  15  ----------------------------<  121  4.2   |  27  |  0.43  Ca    8.5      29 Sep 2017 03:20Phos  2.8     09-29Mg     1.9     09-29    Vancomycin Level, Trough: 12.4 ug/mL (09.27.17 @ 03:40)  Vancomycin Level, Trough: 15.1 ug/mL (09-26-17 @ 03:00  Vancomycin Level, Trough: 20.2 ug/mL (09.25.17 @ 10:15)    MICROBIOLOGY:  Culture - Body Fluid with Gram Stain (09.21.17 @ 21:18)    Gram Stain:  WBC^White Blood Cells QNTY CELLS IN GRAM STAIN: NO CELLS SEEN   NOS^No Organisms Seen    Specimen Source: PLEURAL FLUID    Culture - Blood (09.21.17 @ 02:01)  Staphylococcus epidermidis    Culture - Blood (09.21.17 @ 02:01)  STEP^Staphylococcus epidermidis    C Diff by PCR Result: NotDetec (09.20.17 @ 07:34)    Culture - Body Fluid with Gram Stain (09.17.17 @ 12:42)    Specimen Source: Pleural Fl Other, Pleural Fluid    Culture Results:   No growth to date.    Culture - Blood (09.18.17 @ 08:34)    Specimen Source: .Blood Blood    Culture Results:   No growth at 5 days.    Culture - Body Fluid with Gram Stain (09.17.17 @ 12:42)  Pleural Fl Other, Pleural Fluid  No growth to date.    Specimen Source: .Blood Blood-Venous (09.16.17 @ 15:30)  Culture Results:   No growth at 5 days. (09.16.17 @ 15:30)    Culture - Body Fluid with Gram Stain (09.16.17 @ 00:58)  .Body Fluid Pleural Fluid    Culture Results:   No growth at 5 days    Specimen Source: .Blood Blood-Venous (09.15.17 @ 07:47)  Negative    Culture - Respiratory with Gram Stain (08.20.17 @ 16:47)    Gram Stain Sputum:     Specimen Source: SPUTUM    Organism Identification: Stenotrophomonas maltophilia    Organism: Stenotrophomonas maltophilia    C Diff by PCR Result: NotDetec (09-20 @ 07:34)  Rapid RVP Result: NotDetec (09-19 @ 14:22)    C Diff by PCR Result: NotDetec (09-20 @ 07:34)    RADIOLOGY:  Xray Chest 1 View AP -PORTABLE-Routine (09.26.17 @ 06:39)   IMPRESSION:  Left IJ line and right chest tube unchanged.  No significant change in right mid and lower lung opacity likely related to loculated pleural fluid seen on the CT scan.  Continued bibasilar opacity which may be due to subsegmental atelectasis.    RADIOLOGY:  CT Abdomen and Pelvis w/ IV Cont (09.21.17 @ 12:50)   IMPRESSION:   Status post esophagectomy and gastric pull-through with adjacent extraluminal contrast consistent with known leak.  Tracheostomy tube in place.  Moderate sized pleural effusions with loculations.  Evidence of pericarditis with small to moderate-sized pericardial effusion.  J-tube in place.  Small volume ascites. Anasarca.    Xray Esophagram (09.19.17 @ 12:08)   IMPRESSION:  Status post [facility]. Findings as described above consistent with a leak at the distal site just above the hemidiaphragm into the pleural space.       Transthoracic Echocardiogram (09.18.17 @ 10:15) >  CONCLUSIONS:  1. Mitral annular calcification and calcified mitral leaflets with normal diastolic opening.  2. Calcified aortic valve with decreased opening.  3. Low Normal left ventricular systolic function. EF 50%  4. The right ventricle is not well visualized; grossly normal right ventricular systolic function.  5. Thickened  pericardium with small  0.9cm circumferential pericardial effusion. There is evidence of raised intra pericardial pressure with RA buckling however no  tamponade seen.

## 2017-09-29 NOTE — PROGRESS NOTE ADULT - SUBJECTIVE AND OBJECTIVE BOX
ACS Progress Note    S: Patient seen and examined. No acute events overnight. Pain well controlled with current regimen. Denies nausea/vomiting. Endorses passing gas and bowel movements.     O:  Vital Signs Last 24 Hrs  T(C): 36.6 (29 Sep 2017 08:00), Max: 37.2 (28 Sep 2017 12:00)  T(F): 97.9 (29 Sep 2017 08:00), Max: 98.9 (28 Sep 2017 12:00)  HR: 88 (29 Sep 2017 10:08) (68 - 95)  BP: 133/62 (29 Sep 2017 10:00) (110/57 - 144/69)  BP(mean): 79 (29 Sep 2017 10:00) (67 - 101)  RR: 22 (29 Sep 2017 10:00) (19 - 26)  SpO2: 99% (29 Sep 2017 10:08) (97% - 100%)    I&O's Detail    28 Sep 2017 07:01  -  29 Sep 2017 07:00  --------------------------------------------------------  IN:    Enteral Tube Flush: 40 mL    IV PiggyBack: 50 mL    Jevity: 1380 mL    sodium chloride 0.9%.: 240 mL  Total IN: 1710 mL    OUT:    Indwelling Catheter - Urethral: 450 mL    Voided: 1425 mL  Total OUT: 1875 mL    Total NET: -165 mL      29 Sep 2017 07:01  -  29 Sep 2017 10:58  --------------------------------------------------------  IN:    IV PiggyBack: 300 mL    Jevity: 240 mL    sodium chloride 0.9%.: 30 mL  Total IN: 570 mL    OUT:    Voided: 125 mL  Total OUT: 125 mL    Total NET: 445 mL          MEDICATIONS  (STANDING):  folic acid Injectable 1 milliGRAM(s) IV Push daily  pyridoxine Injectable 100 milliGRAM(s) IV Push daily  cyanocobalamin Injectable 500 MICROGram(s) SubCutaneous daily  pantoprazole  Injectable 40 milliGRAM(s) IV Push two times a day  methylPREDNISolone sodium succinate Injectable 20 milliGRAM(s) IV Push two times a day  fluticasone propionate   220 MICROgram(s) HFA Inhaler 1 Puff(s) Inhalation two times a day  sodium chloride 0.9%. 1000 milliLiter(s) (10 mL/Hr) IV Continuous <Continuous>  aspirin Suppository 300 milliGRAM(s) Rectal daily  ALBUTerol/ipratropium for Nebulization 3 milliLiter(s) Nebulizer every 6 hours  dornase ziyad Solution 2.5 milliGRAM(s) Inhalation two times a day  fosphenytoin IVPB 200 milliGRAM(s) PE IV Intermittent every 12 hours  metoprolol Injectable 2.5 milliGRAM(s) IV Push every 6 hours  nystatin Powder 1 Application(s) Topical two times a day  chlorhexidine 4% Liquid 1 Application(s) Topical daily    MEDICATIONS  (PRN):  guaiFENesin    Syrup 200 milliGRAM(s) Oral every 6 hours PRN Cough  HYDROcodone/homatropine Syrup 5 milliLiter(s) Oral every 4 hours PRN Cough  melatonin 3 milliGRAM(s) Oral at bedtime PRN Insomnia  LORazepam   Injectable 0.25 milliGRAM(s) IV Push two times a day PRN Anxiety  sodium chloride 3%  Inhalation 4 milliLiter(s) Inhalation every 6 hours PRN respiratory congestion                            9.9    8.31  )-----------( 211      ( 29 Sep 2017 03:20 )             31.3       09-29    134<L>  |  100  |  15  ----------------------------<  121<H>  4.2   |  27  |  0.43<L>    Ca    8.5      29 Sep 2017 03:20  Phos  2.8     09-29  Mg     1.9     09-29        Physical Exam:  Gen: Laying in bed, NAD, alert and oriented.   Resp: Unlabored breathing  Abd: soft, NTND. GINA with serous output. J-tube in place.

## 2017-09-29 NOTE — PROGRESS NOTE ADULT - SUBJECTIVE AND OBJECTIVE BOX
Follow-up Pulm Progress Note    Pt seen and examined.  Denies SOB/CP.     Medications:  MEDICATIONS  (STANDING):  folic acid Injectable 1 milliGRAM(s) IV Push daily  pyridoxine Injectable 100 milliGRAM(s) IV Push daily  cyanocobalamin Injectable 500 MICROGram(s) SubCutaneous daily  pantoprazole  Injectable 40 milliGRAM(s) IV Push two times a day  methylPREDNISolone sodium succinate Injectable 20 milliGRAM(s) IV Push two times a day  fluticasone propionate   220 MICROgram(s) HFA Inhaler 1 Puff(s) Inhalation two times a day  sodium chloride 0.9%. 1000 milliLiter(s) (10 mL/Hr) IV Continuous <Continuous>  aspirin Suppository 300 milliGRAM(s) Rectal daily  ALBUTerol/ipratropium for Nebulization 3 milliLiter(s) Nebulizer every 6 hours  dornase ziyad Solution 2.5 milliGRAM(s) Inhalation two times a day  fosphenytoin IVPB 200 milliGRAM(s) PE IV Intermittent every 12 hours  metoprolol Injectable 2.5 milliGRAM(s) IV Push every 6 hours  nystatin Powder 1 Application(s) Topical two times a day  chlorhexidine 4% Liquid 1 Application(s) Topical daily    MEDICATIONS  (PRN):  guaiFENesin    Syrup 200 milliGRAM(s) Oral every 6 hours PRN Cough  HYDROcodone/homatropine Syrup 5 milliLiter(s) Oral every 4 hours PRN Cough  melatonin 3 milliGRAM(s) Oral at bedtime PRN Insomnia  LORazepam   Injectable 0.25 milliGRAM(s) IV Push two times a day PRN Anxiety  sodium chloride 3%  Inhalation 4 milliLiter(s) Inhalation every 6 hours PRN respiratory congestion    Vital Signs Last 24 Hrs  T(C): 36.5 (29 Sep 2017 12:24), Max: 36.8 (28 Sep 2017 20:00)  T(F): 97.7 (29 Sep 2017 12:24), Max: 98.3 (28 Sep 2017 20:00)  HR: 86 (29 Sep 2017 12:24) (68 - 95)  BP: 139/62 (29 Sep 2017 12:24) (110/57 - 140/71)  BP(mean): 79 (29 Sep 2017 10:00) (67 - 101)  RR: 20 (29 Sep 2017 12:24) (19 - 26)  SpO2: 100% (29 Sep 2017 12:24) (97% - 100%)    09-28 @ 07:01  -  09-29 @ 07:00  --------------------------------------------------------  IN: 1710 mL / OUT: 1875 mL / NET: -165 mL          LABS:                        9.9    8.31  )-----------( 211      ( 29 Sep 2017 03:20 )             31.3     09-29    134<L>  |  100  |  15  ----------------------------<  121<H>  4.2   |  27  |  0.43<L>    Ca    8.5      29 Sep 2017 03:20  Phos  2.8     09-29  Mg     1.9     09-29    CAPILLARY BLOOD GLUCOSE    CULTURES: (if applicable)    Physical Examination:  PULM: decreased BS at bases  CVS: S1, S2 heard    RADIOLOGY REVIEWED  CXR:     CT chest:    TTE:

## 2017-09-29 NOTE — PROGRESS NOTE ADULT - SUBJECTIVE AND OBJECTIVE BOX
82 M PMH Asthma, Anxiety, BCC skin, BPH, CVA, Chronic allergic rhinitis, HTN, HLD, RA, Sz DO, w recent esophagectomt 2/2 CA w malfunctioning J-tube.  The patienet presented to [facility] from a rehab center with non-functioning J tube and SOB. He had undergone a robotic-assisted [place] esophagogastrectomy with feeding J tube placement for esophageal adenocarcinoma (T1aN0) with Dr. Dyer and Dr. Christian Weinstein on 8/14/17. After surgery, patient was found to have an anastomotic leak and was treated conservatively with NPO, antibiotics, chest tube drainage, and J tube feeding. Patient was eventually discharged to rehab. On DOA, patient's J tube was unable to be used, and he was sent to the ED for evaluation. Patient also complained of SOB, but no chest pain or abdominal pain. Denies fever or chills. Patient was taking medications by mouth, but was only getting nutrition via the J tube. Esophagram done on 9/19/17 showing  a leak at the distal site just above the hemidiaphragm into the pleural space.   Patient was found to have a Left pleural effusion, underwent a pigtail placement, developed respiratory distress, was transferred to the SICU where he was intubated. Patient also with right GINA drain.  He was then  transferred to [facility]ICU for further management.   POD # 46 ([id number]): [place] esophagectomy    Issues:              Distal esophagal leak  Respiratory failure  Hypotension  Pleural effusion  Esophageal leak  Seizure disorder              NSTEMI    Home Medications:   * No Current Medications as of 20-Sep-2017 21:03 documented in Structured Notes  · 	aspirin-dipyridamole 25 mg-200 mg oral capsule, extended release: 1 cap(s) orally 2 times a day  · 	docusate sodium 100 mg oral capsule: 1 cap(s) orally 2 times a day  · 	ProAir HFA 90 mcg/inh inhalation aerosol: 2 puff(s) inhaled 4 times a day, As Needed   · 	Vitamin B-12 500 mcg oral tablet: 1 tab(s) orally once a day  · 	amLODIPine 5 mg oral tablet: 1 tab(s) orally once a day in pm  · 	simvastatin 40 mg oral tablet: 1 tab(s) orally once a day in pm  · 	tamsulosin 0.4 mg oral capsule: 1 cap(s) orally once a day in pm  · 	Zetia 10 mg oral tablet: 1 tab(s) orally once a day in pm  · 	ALPRAZolam 0.25 mg oral tablet: 2 tab(s) orally 2 times a day  · 	olmesartan 20 mg oral tablet: 1 tab(s) orally once a day in pm  · 	montelukast 10 mg oral tablet: 1 tab(s) orally once a day in pm  · 	Dilantin 100 mg oral capsule: 2 cap(s) orally 2 times a day  · 	predniSONE 5 mg oral tablet: 1 tab(s) orally once a day in am  · 	finasteride 5 mg oral tablet: 1 tab(s) orally once a day in pm  · 	folic acid 0.4 mg oral tablet: 1 tab(s) orally once a day  · 	Vitamin B6 100 mg oral tablet: 1 tab(s) orally once a day    MEDICATIONS  (STANDING):  folic acid Injectable 1 milliGRAM(s) IV Push daily  pyridoxine Injectable 100 milliGRAM(s) IV Push daily  cyanocobalamin Injectable 500 MICROGram(s) SubCutaneous daily  pantoprazole  Injectable 40 milliGRAM(s) IV Push two times a day  methylPREDNISolone sodium succinate Injectable 20 milliGRAM(s) IV Push two times a day  fluticasone propionate   220 MICROgram(s) HFA Inhaler 1 Puff(s) Inhalation two times a day  sodium chloride 0.9%. 1000 milliLiter(s) (10 mL/Hr) IV Continuous <Continuous>  aspirin Suppository 300 milliGRAM(s) Rectal daily  ALBUTerol/ipratropium for Nebulization 3 milliLiter(s) Nebulizer every 6 hours  dornase izyad Solution 2.5 milliGRAM(s) Inhalation two times a day  fosphenytoin IVPB 200 milliGRAM(s) PE IV Intermittent every 12 hours  metoprolol Injectable 2.5 milliGRAM(s) IV Push every 6 hours  nystatin Powder 1 Application(s) Topical two times a day  chlorhexidine 4% Liquid 1 Application(s) Topical daily    MEDICATIONS  (PRN):  guaiFENesin    Syrup 200 milliGRAM(s) Oral every 6 hours PRN Cough  HYDROcodone/homatropine Syrup 5 milliLiter(s) Oral every 4 hours PRN Cough  melatonin 3 milliGRAM(s) Oral at bedtime PRN Insomnia  LORazepam   Injectable 0.25 milliGRAM(s) IV Push two times a day PRN Anxiety  sodium chloride 3%  Inhalation 4 milliLiter(s) Inhalation every 6 hours PRN respiratory congestion    ICU Vital Signs Last 24 Hrs  T(C): 36.8 (29 Sep 2017 04:00), Max: 37.2 (28 Sep 2017 12:00)  T(F): 98.2 (29 Sep 2017 04:00), Max: 98.9 (28 Sep 2017 12:00)  HR: 83 (29 Sep 2017 06:00) (68 - 95)  BP: 133/64 (29 Sep 2017 06:00) (110/57 - 144/69)  BP(mean): 81 (29 Sep 2017 06:00) (67 - 101)  RR: 19 (29 Sep 2017 06:00) (19 - 26)  SpO2: 100% (29 Sep 2017 06:00) (97% - 100%)      Physical exam:                                                   Neuro:      Alert & nonfocal                              Cardiovascular:  S1 & S2, regular                           Respiratory:   Air entry is fair and equal on both sides, has bilateral conducted sounds                          GI:  Soft, nondistended and nontender, Bowel sounds active                                  Ext: No cyanosis or edema,       I&O's Summary    27 Sep 2017 07:01  -  28 Sep 2017 07:00  --------------------------------------------------------  IN: 2640 mL / OUT: 1865 mL / NET: 775 mL    28 Sep 2017 07:01  -  29 Sep 2017 06:46  --------------------------------------------------------  IN: 1640 mL / OUT: 1875 mL / NET: -235 mL    Labs:                                                                           9.9    8.31  )-----------( 211      ( 29 Sep 2017 03:20 )             31.3                            09-29    134<L>  |  100  |  15  ----------------------------<  121<H>  4.2   |  27  |  0.43<L>    Ca    8.5      29 Sep 2017 03:20  Phos  2.8     09-29  Mg     1.9     09-29    Plan:  82 M PMH Asthma, Anxiety, BCC skin, BPH, CVA, Chronic allergic rhinitis, HTN, HLD, RA, Sz DO, w recent esophagectomt 2/2 CA w malfunctioning J-tube.  The patienet presented to [facility] from a rehab center with non-functioning J tube and SOB. He had undergone a robotic-assisted [place] esophagogastrectomy with feeding J tube placement for esophageal adenocarcinoma (T1aN0) with Dr. Dyer and Dr. Christian Weinstein on 8/14/17. After surgery, patient was found to have an anastomotic leak and was treated conservatively with NPO, antibiotics, chest tube drainage, and J tube feeding. Patient was eventually discharged to rehab. On DOA, patient's J tube was unable to be used, and he was sent to the ED for evaluation. Patient also complained of SOB, but no chest pain or abdominal pain. Denies fever or chills. Patient was taking medications by mouth, but was only getting nutrition via the J tube. Esophagram done on 9/19/17 showing  a leak at the distal site just above the hemidiaphragm into the pleural space.   Patient was found to have a Left pleural effusion, underwent a pigtail placement, developed respiratory distress, was transferred to the SICU where he was intubated. Patient also with right GINA drain.  He was then  transferred to [facility] CTICU for further management.   POD # 46 ([id number]): [place] esophagectomy    Issues:              Distal esophagal leak  Respiratory failure  Hypotension  Pleural effusion  Esophageal leak  Seizure disorder              NSTEMI                             Neuro:                                          Pain control with Fentanyl / Tylenol PRN     Seizure disorder - Continue Fosphenytoin                            Cardiovascular:                                          Continue hemodynamic monitoring.    CAD: On ASA, No Plavix. Continue lopressor                                Respiratory:                                         Asthma: Continue Solumedrol, Duonebs, pulmozume     Persistent cough - Consider bronch to r/o TEF                                          Loculated R-effusion - A new pigtail inserted & drained 200 cc serous fluid                                                                pulmonary toilet     R-Pigtail to suction                            GI                                         NPO     EGD - contained perforation of stomach & leak near anastomosis site                                          Continue GI prophylaxis with Protonix                                         Tolerating J-tube feeds 	                                                                 Renal:                                         Monitor I/Os and electrolytes                                         López                                                  Hem/ Onc:                                         Monitor chest tube output                            Infectious disease:                                            Discontinued Vanco / Cefipime / Flagyly / Micafungin- took out TLC                            Endocrine                                             Continue Accu-Checks with coverage.     All available pertinent clinical, laboratory, radiographic, hemodynamic, echocardiographic, respiratory data, microbiologic data and chart were reviewed and analyzed Patient seen, examined and plan discussed with CT Surgeon / CTICU team during rounds.    Aleksandr Hayden MD

## 2017-09-29 NOTE — PROGRESS NOTE ADULT - ASSESSMENT
82M esophageal ca s/p chemo/RT followed by [place] esophagogastrectomy with feeding J tube. Complicated by leak managed conservatively.    PLAN:  - J-tube feeds at goal  - Continue abx  - Monitor respiratory status  - Care per CTICU    Kamlesh Ramsay, PGY-1  D Team Surgery [id number]

## 2017-09-29 NOTE — PROGRESS NOTE ADULT - SUBJECTIVE AND OBJECTIVE BOX
MEJIA LATIF:[id number],   82yMale followed for: esophageal cancer  penicillin (Rash)    PAST MEDICAL & SURGICAL HISTORY:  Coronary artery disease: s/p 3 stents on June 30, 2017 at [place] Dr. Lavelle Reid  OA (osteoarthritis) of knee: right  Former smoker, stopped smoking in distant past  Rheumatoid arthritis  Seizure disorder: 1 episode approximately 15 yrs ago  Asthma  Hyperlipidemia  BPH (benign prostatic hyperplasia)  HTN (hypertension)  Esophagus cancer  Chronic allergic rhinitis  BCC (basal cell carcinoma): skin  Cerebrovascular accident (CVA)  Anxiety  Anastomotic leak following esophagectomy: [place] [facility] esophagectomy  History of tonsillectomy  H/O heart artery stent: June 30, 2017  Knee arthropathy: left  History of total hip replacement: left  History of hernia repair    FAMILY HISTORY:  Family history of heart attack (Sibling)  Family history of pulmonary embolism: father @ 49 yr old  FH: CAD (coronary artery disease)    MEDICATIONS  (STANDING):  folic acid Injectable 1 milliGRAM(s) IV Push daily  pyridoxine Injectable 100 milliGRAM(s) IV Push daily  cyanocobalamin Injectable 500 MICROGram(s) SubCutaneous daily  pantoprazole  Injectable 40 milliGRAM(s) IV Push two times a day  methylPREDNISolone sodium succinate Injectable 20 milliGRAM(s) IV Push two times a day  fluticasone propionate   220 MICROgram(s) HFA Inhaler 1 Puff(s) Inhalation two times a day  sodium chloride 0.9%. 1000 milliLiter(s) (10 mL/Hr) IV Continuous <Continuous>  aspirin Suppository 300 milliGRAM(s) Rectal daily  ALBUTerol/ipratropium for Nebulization 3 milliLiter(s) Nebulizer every 6 hours  dornase ziyad Solution 2.5 milliGRAM(s) Inhalation two times a day  fosphenytoin IVPB 200 milliGRAM(s) PE IV Intermittent every 12 hours  metoprolol Injectable 2.5 milliGRAM(s) IV Push every 6 hours  nystatin Powder 1 Application(s) Topical two times a day  chlorhexidine 4% Liquid 1 Application(s) Topical daily    MEDICATIONS  (PRN):  guaiFENesin    Syrup 200 milliGRAM(s) Oral every 6 hours PRN Cough  HYDROcodone/homatropine Syrup 5 milliLiter(s) Oral every 4 hours PRN Cough  melatonin 3 milliGRAM(s) Oral at bedtime PRN Insomnia  LORazepam   Injectable 0.25 milliGRAM(s) IV Push two times a day PRN Anxiety  sodium chloride 3%  Inhalation 4 milliLiter(s) Inhalation every 6 hours PRN respiratory congestion      Vital Signs Last 24 Hrs  T(C): 36.6 (29 Sep 2017 08:00), Max: 37.2 (28 Sep 2017 12:00)  T(F): 97.9 (29 Sep 2017 08:00), Max: 98.9 (28 Sep 2017 12:00)  HR: 81 (29 Sep 2017 07:00) (68 - 95)  BP: 135/53 (29 Sep 2017 07:00) (110/57 - 144/69)  BP(mean): 75 (29 Sep 2017 07:00) (67 - 101)  RR: 24 (29 Sep 2017 07:00) (19 - 26)  SpO2: 99% (29 Sep 2017 07:00) (97% - 100%)  nc/at  s1s2  cta  soft, nt, nd no guarding or rebound  no c/c/e    CBC Full  -  ( 29 Sep 2017 03:20 )  WBC Count : 8.31 K/uL  Hemoglobin : 9.9 g/dL  Hematocrit : 31.3 %  Platelet Count - Automated : 211 K/uL  Mean Cell Volume : 92.9 fL  Mean Cell Hemoglobin : 29.4 pg  Mean Cell Hemoglobin Concentration : 31.6 %  Auto Neutrophil # : x  Auto Lymphocyte # : x  Auto Monocyte # : x  Auto Eosinophil # : x  Auto Basophil # : x  Auto Neutrophil % : x  Auto Lymphocyte % : x  Auto Monocyte % : x  Auto Eosinophil % : x  Auto Basophil % : x    09-29    134<L>  |  100  |  15  ----------------------------<  121<H>  4.2   |  27  |  0.43<L>    Ca    8.5      29 Sep 2017 03:20  Phos  2.8     09-29  Mg     1.9     09-29

## 2017-09-30 LAB
BUN SERPL-MCNC: 15 MG/DL — SIGNIFICANT CHANGE UP (ref 7–23)
CALCIUM SERPL-MCNC: 8.6 MG/DL — SIGNIFICANT CHANGE UP (ref 8.4–10.5)
CHLORIDE SERPL-SCNC: 102 MMOL/L — SIGNIFICANT CHANGE UP (ref 98–107)
CO2 SERPL-SCNC: 27 MMOL/L — SIGNIFICANT CHANGE UP (ref 22–31)
CREAT SERPL-MCNC: 0.49 MG/DL — LOW (ref 0.5–1.3)
GLUCOSE SERPL-MCNC: 91 MG/DL — SIGNIFICANT CHANGE UP (ref 70–99)
HCT VFR BLD CALC: 32.4 % — LOW (ref 39–50)
HGB BLD-MCNC: 10.2 G/DL — LOW (ref 13–17)
MAGNESIUM SERPL-MCNC: 2.1 MG/DL — SIGNIFICANT CHANGE UP (ref 1.6–2.6)
MCHC RBC-ENTMCNC: 29.4 PG — SIGNIFICANT CHANGE UP (ref 27–34)
MCHC RBC-ENTMCNC: 31.5 % — LOW (ref 32–36)
MCV RBC AUTO: 93.4 FL — SIGNIFICANT CHANGE UP (ref 80–100)
NRBC # FLD: 0 — SIGNIFICANT CHANGE UP
PHOSPHATE SERPL-MCNC: 2.9 MG/DL — SIGNIFICANT CHANGE UP (ref 2.5–4.5)
PLATELET # BLD AUTO: 226 K/UL — SIGNIFICANT CHANGE UP (ref 150–400)
PMV BLD: 9.6 FL — SIGNIFICANT CHANGE UP (ref 7–13)
POTASSIUM SERPL-MCNC: 4.6 MMOL/L — SIGNIFICANT CHANGE UP (ref 3.5–5.3)
POTASSIUM SERPL-SCNC: 4.6 MMOL/L — SIGNIFICANT CHANGE UP (ref 3.5–5.3)
RBC # BLD: 3.47 M/UL — LOW (ref 4.2–5.8)
RBC # FLD: 14.7 % — HIGH (ref 10.3–14.5)
SODIUM SERPL-SCNC: 138 MMOL/L — SIGNIFICANT CHANGE UP (ref 135–145)
WBC # BLD: 9.89 K/UL — SIGNIFICANT CHANGE UP (ref 3.8–10.5)
WBC # FLD AUTO: 9.89 K/UL — SIGNIFICANT CHANGE UP (ref 3.8–10.5)

## 2017-09-30 PROCEDURE — 71010: CPT | Mod: 26

## 2017-09-30 PROCEDURE — 71010: CPT | Mod: 26,77

## 2017-09-30 RX ORDER — ACETYLCYSTEINE 200 MG/ML
5 VIAL (ML) MISCELLANEOUS EVERY 8 HOURS
Qty: 0 | Refills: 0 | Status: DISCONTINUED | OUTPATIENT
Start: 2017-09-30 | End: 2017-09-30

## 2017-09-30 RX ADMIN — PANTOPRAZOLE SODIUM 40 MILLIGRAM(S): 20 TABLET, DELAYED RELEASE ORAL at 05:17

## 2017-09-30 RX ADMIN — Medication 100 MILLIGRAM(S): at 12:27

## 2017-09-30 RX ADMIN — Medication 20 MILLIGRAM(S): at 05:17

## 2017-09-30 RX ADMIN — Medication 1 PUFF(S): at 09:22

## 2017-09-30 RX ADMIN — Medication 3 MILLILITER(S): at 03:40

## 2017-09-30 RX ADMIN — Medication 3 MILLILITER(S): at 10:22

## 2017-09-30 RX ADMIN — Medication 20 MILLIGRAM(S): at 17:12

## 2017-09-30 RX ADMIN — Medication 5 MILLILITER(S): at 15:49

## 2017-09-30 RX ADMIN — FOSPHENYTOIN 108 MILLIGRAM(S) PE: 50 INJECTION INTRAMUSCULAR; INTRAVENOUS at 17:12

## 2017-09-30 RX ADMIN — PREGABALIN 500 MICROGRAM(S): 225 CAPSULE ORAL at 12:27

## 2017-09-30 RX ADMIN — Medication 3 MILLILITER(S): at 15:36

## 2017-09-30 RX ADMIN — NYSTATIN CREAM 1 APPLICATION(S): 100000 CREAM TOPICAL at 05:17

## 2017-09-30 RX ADMIN — Medication 1 PUFF(S): at 21:38

## 2017-09-30 RX ADMIN — DORNASE ALFA 2.5 MILLIGRAM(S): 1 SOLUTION RESPIRATORY (INHALATION) at 22:35

## 2017-09-30 RX ADMIN — Medication 1 MILLIGRAM(S): at 12:27

## 2017-09-30 RX ADMIN — Medication 3 MILLILITER(S): at 22:35

## 2017-09-30 RX ADMIN — PANTOPRAZOLE SODIUM 40 MILLIGRAM(S): 20 TABLET, DELAYED RELEASE ORAL at 17:12

## 2017-09-30 RX ADMIN — FOSPHENYTOIN 108 MILLIGRAM(S) PE: 50 INJECTION INTRAMUSCULAR; INTRAVENOUS at 05:17

## 2017-09-30 RX ADMIN — DORNASE ALFA 2.5 MILLIGRAM(S): 1 SOLUTION RESPIRATORY (INHALATION) at 10:21

## 2017-09-30 RX ADMIN — Medication 5 MILLILITER(S): at 22:35

## 2017-09-30 RX ADMIN — Medication 300 MILLIGRAM(S): at 12:28

## 2017-09-30 RX ADMIN — NYSTATIN CREAM 1 APPLICATION(S): 100000 CREAM TOPICAL at 17:13

## 2017-09-30 RX ADMIN — Medication 2.5 MILLIGRAM(S): at 00:56

## 2017-09-30 NOTE — PROGRESS NOTE ADULT - SUBJECTIVE AND OBJECTIVE BOX
D Team Surgery Progress Note - pager [phone]    Patient is a 82y old  Male who presents with a chief complaint of feeding tube clogged s/p maryjane ford (21 Sep 2017 04:19)      SUBJECTIVE: Patient seen and examined on D team morning rounds. No acute events overnight. Drain outputs are minimal and serous. On tube feeds at 60cc/hr. Is now off all antibiotics. Has been voiding appropriately since garza was removed. Denies pain/nausea/vomiting.      OBJECTIVE:     ** Physical Exam **  Gen: Alert, NAD  Abd: Soft, nontender, nondistended. Drain output is serous. J tube in place with no surrounding erythema.    ** Vital signs / I&O's **    Vital Signs Last 24 Hrs  T(C): 36.4 (30 Sep 2017 04:49), Max: 36.6 (29 Sep 2017 20:29)  T(F): 97.6 (30 Sep 2017 04:49), Max: 97.9 (29 Sep 2017 20:29)  HR: 81 (30 Sep 2017 04:49) (80 - 89)  BP: 125/60 (30 Sep 2017 04:49) (125/60 - 139/62)  BP(mean): 79 (29 Sep 2017 10:00) (79 - 79)  RR: 18 (30 Sep 2017 04:49) (18 - 22)  SpO2: 100% (30 Sep 2017 04:49) (95% - 100%)      29 Sep 2017 07:01  -  30 Sep 2017 07:00  --------------------------------------------------------  IN:    IV PiggyBack: 300 mL    Jevity: 780 mL    sodium chloride 0.9%.: 120 mL  Total IN: 1200 mL    OUT:    Voided: 1450 mL  Total OUT: 1450 mL    Total NET: -250 mL            ** Labs **                          10.2   9.89  )-----------( 226      ( 30 Sep 2017 06:30 )             32.4     30 Sep 2017 06:30    138    |  102    |  15     ----------------------------<  91     4.6     |  27     |  0.49     Ca    8.6        30 Sep 2017 06:30  Phos  2.9       30 Sep 2017 06:30  Mg     2.1       30 Sep 2017 06:30        CAPILLARY BLOOD GLUCOSE                  MEDICATIONS  (STANDING):  folic acid Injectable 1 milliGRAM(s) IV Push daily  pyridoxine Injectable 100 milliGRAM(s) IV Push daily  cyanocobalamin Injectable 500 MICROGram(s) SubCutaneous daily  pantoprazole  Injectable 40 milliGRAM(s) IV Push two times a day  methylPREDNISolone sodium succinate Injectable 20 milliGRAM(s) IV Push two times a day  fluticasone propionate   220 MICROgram(s) HFA Inhaler 1 Puff(s) Inhalation two times a day  sodium chloride 0.9%. 1000 milliLiter(s) (10 mL/Hr) IV Continuous <Continuous>  aspirin Suppository 300 milliGRAM(s) Rectal daily  ALBUTerol/ipratropium for Nebulization 3 milliLiter(s) Nebulizer every 6 hours  dornase ziyad Solution 2.5 milliGRAM(s) Inhalation two times a day  fosphenytoin IVPB 200 milliGRAM(s) PE IV Intermittent every 12 hours  nystatin Powder 1 Application(s) Topical two times a day  chlorhexidine 4% Liquid 1 Application(s) Topical daily    MEDICATIONS  (PRN):  guaiFENesin    Syrup 200 milliGRAM(s) Oral every 6 hours PRN Cough  HYDROcodone/homatropine Syrup 5 milliLiter(s) Oral every 4 hours PRN Cough  melatonin 3 milliGRAM(s) Oral at bedtime PRN Insomnia  LORazepam   Injectable 0.25 milliGRAM(s) IV Push two times a day PRN Anxiety  sodium chloride 3%  Inhalation 4 milliLiter(s) Inhalation every 6 hours PRN respiratory congestion

## 2017-09-30 NOTE — PROGRESS NOTE ADULT - ASSESSMENT
82M esophageal ca s/p chemo/RT followed by [place] esophagogastrectomy with feeding J tube. Complicated by leak managed conservatively.    PLAN:  - J-tube feeds at goal  - Monitor respiratory status  - PT / OOB  - Incentive spirometry  - Care per thoracic surgery  - Patient seen and examined with Dr. Guadalupe

## 2017-09-30 NOTE — PROGRESS NOTE ADULT - SUBJECTIVE AND OBJECTIVE BOX
MEJIA LATIF:[id number],   82yMale followed for: esophageal cancer  penicillin (Rash)    PAST MEDICAL & SURGICAL HISTORY:  Coronary artery disease: s/p 3 stents on June 30, 2017 at [place] Dr. Lavelle Reid  OA (osteoarthritis) of knee: right  Former smoker, stopped smoking in distant past  Rheumatoid arthritis  Seizure disorder: 1 episode approximately 15 yrs ago  Asthma  Hyperlipidemia  BPH (benign prostatic hyperplasia)  HTN (hypertension)  Esophagus cancer  Chronic allergic rhinitis  BCC (basal cell carcinoma): skin  Cerebrovascular accident (CVA)  Anxiety  Anastomotic leak following esophagectomy: [place] [facility]an esophagectomy  History of tonsillectomy  H/O heart artery stent: June 30, 2017  Knee arthropathy: left  History of total hip replacement: left  History of hernia repair    FAMILY HISTORY:  Family history of heart attack (Sibling)  Family history of pulmonary embolism: father @ 49 yr old  FH: CAD (coronary artery disease)    MEDICATIONS  (STANDING):  folic acid Injectable 1 milliGRAM(s) IV Push daily  pyridoxine Injectable 100 milliGRAM(s) IV Push daily  cyanocobalamin Injectable 500 MICROGram(s) SubCutaneous daily  pantoprazole  Injectable 40 milliGRAM(s) IV Push two times a day  methylPREDNISolone sodium succinate Injectable 20 milliGRAM(s) IV Push two times a day  fluticasone propionate   220 MICROgram(s) HFA Inhaler 1 Puff(s) Inhalation two times a day  sodium chloride 0.9%. 1000 milliLiter(s) (10 mL/Hr) IV Continuous <Continuous>  aspirin Suppository 300 milliGRAM(s) Rectal daily  ALBUTerol/ipratropium for Nebulization 3 milliLiter(s) Nebulizer every 6 hours  dornase ziyad Solution 2.5 milliGRAM(s) Inhalation two times a day  fosphenytoin IVPB 200 milliGRAM(s) PE IV Intermittent every 12 hours  nystatin Powder 1 Application(s) Topical two times a day  chlorhexidine 4% Liquid 1 Application(s) Topical daily    MEDICATIONS  (PRN):  guaiFENesin    Syrup 200 milliGRAM(s) Oral every 6 hours PRN Cough  HYDROcodone/homatropine Syrup 5 milliLiter(s) Oral every 4 hours PRN Cough  melatonin 3 milliGRAM(s) Oral at bedtime PRN Insomnia  LORazepam   Injectable 0.25 milliGRAM(s) IV Push two times a day PRN Anxiety  sodium chloride 3%  Inhalation 4 milliLiter(s) Inhalation every 6 hours PRN respiratory congestion      Vital Signs Last 24 Hrs  T(C): 36.4 (30 Sep 2017 04:49), Max: 36.6 (29 Sep 2017 08:00)  T(F): 97.6 (30 Sep 2017 04:49), Max: 97.9 (29 Sep 2017 08:00)  HR: 81 (30 Sep 2017 04:49) (80 - 89)  BP: 125/60 (30 Sep 2017 04:49) (121/69 - 140/62)  BP(mean): 79 (29 Sep 2017 10:00) (79 - 83)  RR: 18 (30 Sep 2017 04:49) (18 - 24)  SpO2: 100% (30 Sep 2017 04:49) (95% - 100%)  nc/at  s1s2  cta  soft, nt, nd no guarding or rebound  no c/c/e    CBC Full  -  ( 30 Sep 2017 06:30 )  WBC Count : 9.89 K/uL  Hemoglobin : 10.2 g/dL  Hematocrit : 32.4 %  Platelet Count - Automated : 226 K/uL  Mean Cell Volume : 93.4 fL  Mean Cell Hemoglobin : 29.4 pg  Mean Cell Hemoglobin Concentration : 31.5 %  Auto Neutrophil # : x  Auto Lymphocyte # : x  Auto Monocyte # : x  Auto Eosinophil # : x  Auto Basophil # : x  Auto Neutrophil % : x  Auto Lymphocyte % : x  Auto Monocyte % : x  Auto Eosinophil % : x  Auto Basophil % : x    09-29    134<L>  |  100  |  15  ----------------------------<  121<H>  4.2   |  27  |  0.43<L>    Ca    8.5      29 Sep 2017 03:20  Phos  2.8     09-29  Mg     1.9     09-29

## 2017-10-01 PROCEDURE — 71010: CPT | Mod: 26

## 2017-10-01 RX ORDER — FUROSEMIDE 40 MG
20 TABLET ORAL ONCE
Qty: 0 | Refills: 0 | Status: COMPLETED | OUTPATIENT
Start: 2017-10-01 | End: 2017-10-01

## 2017-10-01 RX ADMIN — Medication 3 MILLILITER(S): at 22:06

## 2017-10-01 RX ADMIN — Medication 20 MILLIGRAM(S): at 18:29

## 2017-10-01 RX ADMIN — DORNASE ALFA 2.5 MILLIGRAM(S): 1 SOLUTION RESPIRATORY (INHALATION) at 22:17

## 2017-10-01 RX ADMIN — SODIUM CHLORIDE 10 MILLILITER(S): 9 INJECTION INTRAMUSCULAR; INTRAVENOUS; SUBCUTANEOUS at 08:02

## 2017-10-01 RX ADMIN — Medication 20 MILLIGRAM(S): at 09:41

## 2017-10-01 RX ADMIN — FOSPHENYTOIN 108 MILLIGRAM(S) PE: 50 INJECTION INTRAMUSCULAR; INTRAVENOUS at 05:06

## 2017-10-01 RX ADMIN — Medication 1 PUFF(S): at 21:09

## 2017-10-01 RX ADMIN — Medication 100 MILLIGRAM(S): at 12:35

## 2017-10-01 RX ADMIN — Medication 3 MILLILITER(S): at 15:50

## 2017-10-01 RX ADMIN — Medication 3 MILLILITER(S): at 03:59

## 2017-10-01 RX ADMIN — PANTOPRAZOLE SODIUM 40 MILLIGRAM(S): 20 TABLET, DELAYED RELEASE ORAL at 18:29

## 2017-10-01 RX ADMIN — DORNASE ALFA 2.5 MILLIGRAM(S): 1 SOLUTION RESPIRATORY (INHALATION) at 10:29

## 2017-10-01 RX ADMIN — NYSTATIN CREAM 1 APPLICATION(S): 100000 CREAM TOPICAL at 18:30

## 2017-10-01 RX ADMIN — FOSPHENYTOIN 108 MILLIGRAM(S) PE: 50 INJECTION INTRAMUSCULAR; INTRAVENOUS at 18:29

## 2017-10-01 RX ADMIN — PANTOPRAZOLE SODIUM 40 MILLIGRAM(S): 20 TABLET, DELAYED RELEASE ORAL at 05:06

## 2017-10-01 RX ADMIN — NYSTATIN CREAM 1 APPLICATION(S): 100000 CREAM TOPICAL at 05:07

## 2017-10-01 RX ADMIN — PREGABALIN 500 MICROGRAM(S): 225 CAPSULE ORAL at 12:35

## 2017-10-01 RX ADMIN — Medication 300 MILLIGRAM(S): at 12:36

## 2017-10-01 RX ADMIN — Medication 1 PUFF(S): at 08:12

## 2017-10-01 RX ADMIN — Medication 20 MILLIGRAM(S): at 05:06

## 2017-10-01 RX ADMIN — Medication 3 MILLILITER(S): at 10:42

## 2017-10-01 RX ADMIN — Medication 1 MILLIGRAM(S): at 12:36

## 2017-10-01 NOTE — PROGRESS NOTE ADULT - SUBJECTIVE AND OBJECTIVE BOX
Subjective:  pt feels well. Mucomyst yesterday cleared secretions. mobilzing in hallway with PT. family at bedside    Vital Signs:  Vital Signs Last 24 Hrs  T(C): 36.6 (10-01-17 @ 17:35), Max: 36.6 (09-30-17 @ 20:00)  T(F): 97.9 (10-01-17 @ 17:35), Max: 97.9 (09-30-17 @ 20:00)  HR: 91 (10-01-17 @ 17:35) (66 - 94)  BP: 131/61 (10-01-17 @ 17:35) (125/58 - 133/65)  RR: 18 (10-01-17 @ 17:35) (18 - 18)  SpO2: 100% (10-01-17 @ 17:35) (97% - 100%) on (O2)    Telemetry/Alarms:  General: WN/WD NAD  Neurology: Awake, nonfocal, MAI x 4  Eyes: Scleras clear, PERRLA/ EOMI, Gross vision intact  ENT:Gross hearing intact, grossly patent pharynx, no stridor  Neck: Neck supple, trachea midline, No JVD,   Respiratory: CTA B/L, No wheezing, rales, rhonchi  CV: RRR, S1S2, no murmurs, rubs or gallops  Abdominal: Soft, NT, ND +BS,   Extremities: LE edema improving, + peripheral pulses  Skin: No Rashes, Hematoma, Ecchymosis  Lymphatic: No Neck, axilla, groin LAD  Psych: Oriented x 3, normal affect  Incisions: c,d,i  Tubes: PTC removed yesterday; CLAY 5 cc/24h  Relevant labs, radiology and Medications reviewed                        10.2   9.89  )-----------( 226      ( 30 Sep 2017 06:30 )             32.4     09-30    138  |  102  |  15  ----------------------------<  91  4.6   |  27  |  0.49<L>    Ca    8.6      30 Sep 2017 06:30  Phos  2.9     09-30  Mg     2.1     09-30        MEDICATIONS  (STANDING):  folic acid Injectable 1 milliGRAM(s) IV Push daily  pyridoxine Injectable 100 milliGRAM(s) IV Push daily  cyanocobalamin Injectable 500 MICROGram(s) SubCutaneous daily  pantoprazole  Injectable 40 milliGRAM(s) IV Push two times a day  methylPREDNISolone sodium succinate Injectable 20 milliGRAM(s) IV Push two times a day  fluticasone propionate   220 MICROgram(s) HFA Inhaler 1 Puff(s) Inhalation two times a day  sodium chloride 0.9%. 1000 milliLiter(s) (10 mL/Hr) IV Continuous <Continuous>  aspirin Suppository 300 milliGRAM(s) Rectal daily  ALBUTerol/ipratropium for Nebulization 3 milliLiter(s) Nebulizer every 6 hours  dornase ziyad Solution 2.5 milliGRAM(s) Inhalation two times a day  fosphenytoin IVPB 200 milliGRAM(s) PE IV Intermittent every 12 hours  nystatin Powder 1 Application(s) Topical two times a day  chlorhexidine 4% Liquid 1 Application(s) Topical daily    MEDICATIONS  (PRN):  guaiFENesin    Syrup 200 milliGRAM(s) Oral every 6 hours PRN Cough  HYDROcodone/homatropine Syrup 5 milliLiter(s) Oral every 4 hours PRN Cough  melatonin 3 milliGRAM(s) Oral at bedtime PRN Insomnia  LORazepam   Injectable 0.25 milliGRAM(s) IV Push two times a day PRN Anxiety  sodium chloride 3%  Inhalation 4 milliLiter(s) Inhalation every 6 hours PRN respiratory congestion    Pertinent Physical Exam  I&O's Summary    30 Sep 2017 07:01  -  01 Oct 2017 07:00  --------------------------------------------------------  IN: 1080 mL / OUT: 655 mL / NET: 425 mL    01 Oct 2017 07:01  -  01 Oct 2017 17:39  --------------------------------------------------------  IN: 420 mL / OUT: 1325 mL / NET: -905 mL        Assessment  82y Male  w/ PAST MEDICAL & SURGICAL HISTORY:  Coronary artery disease: s/p 3 stents on June 30, 2017 at [place] Dr. Lavelle Reid  OA (osteoarthritis) of knee: right  Former smoker, stopped smoking in distant past  Rheumatoid arthritis  Seizure disorder: 1 episode approximately 15 yrs ago  Asthma  Hyperlipidemia  BPH (benign prostatic hyperplasia)  HTN (hypertension)  Esophagus cancer  Chronic allergic rhinitis  BCC (basal cell carcinoma): skin  Cerebrovascular accident (CVA)  Anxiety  Anastomotic leak following esophagectomy: [place] logan esophagectomy  History of tonsillectomy  H/O heart artery stent: June 30, 2017  Knee arthropathy: left  History of total hip replacement: left  History of hernia repair  admitted with complaints of Patient is a 82y old  Male who presents with a chief complaint of feeding tube clogged s/p maryjane logan (21 Sep 2017 04:19)  .  Pt readmitted from rehab found to have persistent esophageal leak. PTC placed and since removed. Clay remains. Pt NPO w TFs. Pulmonary toilet.  Plan for repeat barium swallow in AM. PT for rehab this week.     PLAN  Neuro: Pain management  Pulm: Encourage coughing, deep breathing and use of incentive spirometry. Wean off supplemental oxygen as able. Daily CXR.   Cardio: Monitor telemetry/alarms  GI: NPO w tube feeds. Continue stool softeners.  Renal: monitor urine output, supplement electrolytes as needed  Vasc: Heparin SC/SCDs for DVT prophylaxis  Heme: Stable H/H. .   ID: Off antibiotics. Stable.  Therapy: OOB/ambulate  Tubes: Monitor  CLAY tube output    Discussed with Cardiothoracic Team at AM rounds. Subjective:  pt feels well. Mucomyst yesterday cleared secretions. mobilzing in hallway with PT. family at bedside    Vital Signs:  Vital Signs Last 24 Hrs  T(C): 36.6 (10-01-17 @ 17:35), Max: 36.6 (09-30-17 @ 20:00)  T(F): 97.9 (10-01-17 @ 17:35), Max: 97.9 (09-30-17 @ 20:00)  HR: 91 (10-01-17 @ 17:35) (66 - 94)  BP: 131/61 (10-01-17 @ 17:35) (125/58 - 133/65)  RR: 18 (10-01-17 @ 17:35) (18 - 18)  SpO2: 100% (10-01-17 @ 17:35) (97% - 100%) on (O2)    Telemetry/Alarms:  General: WN/WD NAD  Neurology: Awake, nonfocal, MAI x 4  Eyes: Scleras clear, PERRLA/ EOMI, Gross vision intact  ENT:Gross hearing intact, grossly patent pharynx, no stridor  Neck: Neck supple, trachea midline, No JVD,   Respiratory: CTA B/L, No wheezing, rales, rhonchi  CV: RRR, S1S2, no murmurs, rubs or gallops  Abdominal: Soft, NT, ND +BS,   Extremities: LE edema improving, + peripheral pulses  Skin: No Rashes, Hematoma, Ecchymosis  Lymphatic: No Neck, axilla, groin LAD  Psych: Oriented x 3, normal affect  Incisions: c,d,i  Tubes: PTC removed yesterday; CLAY 5 cc/24h  Relevant labs, radiology and Medications reviewed                        10.2   9.89  )-----------( 226      ( 30 Sep 2017 06:30 )             32.4     09-30    138  |  102  |  15  ----------------------------<  91  4.6   |  27  |  0.49<L>    Ca    8.6      30 Sep 2017 06:30  Phos  2.9     09-30  Mg     2.1     09-30        MEDICATIONS  (STANDING):  folic acid Injectable 1 milliGRAM(s) IV Push daily  pyridoxine Injectable 100 milliGRAM(s) IV Push daily  cyanocobalamin Injectable 500 MICROGram(s) SubCutaneous daily  pantoprazole  Injectable 40 milliGRAM(s) IV Push two times a day  methylPREDNISolone sodium succinate Injectable 20 milliGRAM(s) IV Push two times a day  fluticasone propionate   220 MICROgram(s) HFA Inhaler 1 Puff(s) Inhalation two times a day  sodium chloride 0.9%. 1000 milliLiter(s) (10 mL/Hr) IV Continuous <Continuous>  aspirin Suppository 300 milliGRAM(s) Rectal daily  ALBUTerol/ipratropium for Nebulization 3 milliLiter(s) Nebulizer every 6 hours  dornase ziyad Solution 2.5 milliGRAM(s) Inhalation two times a day  fosphenytoin IVPB 200 milliGRAM(s) PE IV Intermittent every 12 hours  nystatin Powder 1 Application(s) Topical two times a day  chlorhexidine 4% Liquid 1 Application(s) Topical daily    MEDICATIONS  (PRN):  guaiFENesin    Syrup 200 milliGRAM(s) Oral every 6 hours PRN Cough  HYDROcodone/homatropine Syrup 5 milliLiter(s) Oral every 4 hours PRN Cough  melatonin 3 milliGRAM(s) Oral at bedtime PRN Insomnia  LORazepam   Injectable 0.25 milliGRAM(s) IV Push two times a day PRN Anxiety  sodium chloride 3%  Inhalation 4 milliLiter(s) Inhalation every 6 hours PRN respiratory congestion    Pertinent Physical Exam  I&O's Summary    30 Sep 2017 07:01  -  01 Oct 2017 07:00  --------------------------------------------------------  IN: 1080 mL / OUT: 655 mL / NET: 425 mL    01 Oct 2017 07:01  -  01 Oct 2017 17:39  --------------------------------------------------------  IN: 420 mL / OUT: 1325 mL / NET: -905 mL        Assessment  82y Male  w/ PAST MEDICAL & SURGICAL HISTORY:  Coronary artery disease: s/p 3 stents on June 30, 2017 at [place] Dr. Lavelle Reid  OA (osteoarthritis) of knee: right  Former smoker, stopped smoking in distant past  Rheumatoid arthritis  Seizure disorder: 1 episode approximately 15 yrs ago  Asthma  Hyperlipidemia  BPH (benign prostatic hyperplasia)  HTN (hypertension)  Esophagus cancer  Chronic allergic rhinitis  BCC (basal cell carcinoma): skin  Cerebrovascular accident (CVA)  Anxiety  Anastomotic leak following esophagectomy: [place] logan esophagectomy  History of tonsillectomy  H/O heart artery stent: June 30, 2017  Knee arthropathy: left  History of total hip replacement: left  History of hernia repair  admitted with complaints of Patient is a 82y old  Male who presents with a chief complaint of feeding tube clogged s/p maryjane logan (21 Sep 2017 04:19)  .  Pt readmitted from rehab found to have persistent esophageal leak. PTC placed and since removed. Clay remains. Pt NPO w TFs. Pulmonary toilet.  Plan for repeat barium swallow in AM. PT for rehab this week.     PLAN  Neuro: Pain management  Pulm: Encourage coughing, deep breathing and use of incentive spirometry. Wean off supplemental oxygen as able. Daily CXR.   Cardio: Monitor telemetry/alarms  GI: NPO w tube feeds. Continue stool softeners.  Renal: monitor urine output, supplement electrolytes as needed; lasix for diuresis  Vasc: Heparin SC/SCDs for DVT prophylaxis  Heme: Stable H/H. .   ID: Off antibiotics. Stable.  Therapy: OOB/ambulate  Tubes: Monitor  CLAY tube output    Discussed with Cardiothoracic Team at AM rounds.

## 2017-10-01 NOTE — PROGRESS NOTE ADULT - SUBJECTIVE AND OBJECTIVE BOX
Surgery Progress Note    S: Patient seen and examined. No acute events overnight. Pain well controlled with current regimen. Denies nausea/vomiting. Endorses passing gas and bowel movements.     O:  Vital Signs Last 24 Hrs  T(C): 36.4 (01 Oct 2017 08:00), Max: 36.6 (30 Sep 2017 20:00)  T(F): 97.6 (01 Oct 2017 08:00), Max: 97.9 (30 Sep 2017 20:00)  HR: 94 (01 Oct 2017 08:00) (66 - 94)  BP: 131/66 (01 Oct 2017 08:00) (125/57 - 133/65)  BP(mean): --  RR: 18 (01 Oct 2017 08:00) (18 - 18)  SpO2: 100% (01 Oct 2017 08:00) (97% - 100%)    I&O's Detail    30 Sep 2017 07:01  -  01 Oct 2017 07:00  --------------------------------------------------------  IN:    Jevity: 1080 mL  Total IN: 1080 mL    OUT:    Drain: 5 mL    Voided: 650 mL  Total OUT: 655 mL    Total NET: 425 mL      01 Oct 2017 07:01  -  01 Oct 2017 10:05  --------------------------------------------------------  IN:  Total IN: 0 mL    OUT:    Voided: 200 mL  Total OUT: 200 mL    Total NET: -200 mL          MEDICATIONS  (STANDING):  folic acid Injectable 1 milliGRAM(s) IV Push daily  pyridoxine Injectable 100 milliGRAM(s) IV Push daily  cyanocobalamin Injectable 500 MICROGram(s) SubCutaneous daily  pantoprazole  Injectable 40 milliGRAM(s) IV Push two times a day  methylPREDNISolone sodium succinate Injectable 20 milliGRAM(s) IV Push two times a day  fluticasone propionate   220 MICROgram(s) HFA Inhaler 1 Puff(s) Inhalation two times a day  sodium chloride 0.9%. 1000 milliLiter(s) (10 mL/Hr) IV Continuous <Continuous>  aspirin Suppository 300 milliGRAM(s) Rectal daily  ALBUTerol/ipratropium for Nebulization 3 milliLiter(s) Nebulizer every 6 hours  dornase ziyad Solution 2.5 milliGRAM(s) Inhalation two times a day  fosphenytoin IVPB 200 milliGRAM(s) PE IV Intermittent every 12 hours  nystatin Powder 1 Application(s) Topical two times a day  chlorhexidine 4% Liquid 1 Application(s) Topical daily    MEDICATIONS  (PRN):  guaiFENesin    Syrup 200 milliGRAM(s) Oral every 6 hours PRN Cough  HYDROcodone/homatropine Syrup 5 milliLiter(s) Oral every 4 hours PRN Cough  melatonin 3 milliGRAM(s) Oral at bedtime PRN Insomnia  LORazepam   Injectable 0.25 milliGRAM(s) IV Push two times a day PRN Anxiety  sodium chloride 3%  Inhalation 4 milliLiter(s) Inhalation every 6 hours PRN respiratory congestion                            10.2   9.89  )-----------( 226      ( 30 Sep 2017 06:30 )             32.4       09-30    138  |  102  |  15  ----------------------------<  91  4.6   |  27  |  0.49<L>    Ca    8.6      30 Sep 2017 06:30  Phos  2.9     09-30  Mg     2.1     09-30        Physical Exam:  Gen: Laying in bed, NAD, alert and oriented.   Resp: Unlabored breathing  Abd: Soft, nontender, nondistended. Drain output is serous. J tube in place with no surrounding erythema.

## 2017-10-01 NOTE — PROGRESS NOTE ADULT - SUBJECTIVE AND OBJECTIVE BOX
MEJIA LATIF:[id number],   82yMale followed for:  penicillin (Rash)    PAST MEDICAL & SURGICAL HISTORY:  Coronary artery disease: s/p 3 stents on June 30, 2017 at [place] Dr. Lavelle Reid  OA (osteoarthritis) of knee: right  Former smoker, stopped smoking in distant past  Rheumatoid arthritis  Seizure disorder: 1 episode approximately 15 yrs ago  Asthma  Hyperlipidemia  BPH (benign prostatic hyperplasia)  HTN (hypertension)  Esophagus cancer  Chronic allergic rhinitis  BCC (basal cell carcinoma): skin  Cerebrovascular accident (CVA)  Anxiety  Anastomotic leak following esophagectomy: [place] logan esophagectomy  History of tonsillectomy  H/O heart artery stent: June 30, 2017  Knee arthropathy: left  History of total hip replacement: left  History of hernia repair    FAMILY HISTORY:  Family history of heart attack (Sibling)  Family history of pulmonary embolism: father @ 49 yr old  FH: CAD (coronary artery disease)    MEDICATIONS  (STANDING):  folic acid Injectable 1 milliGRAM(s) IV Push daily  pyridoxine Injectable 100 milliGRAM(s) IV Push daily  cyanocobalamin Injectable 500 MICROGram(s) SubCutaneous daily  pantoprazole  Injectable 40 milliGRAM(s) IV Push two times a day  methylPREDNISolone sodium succinate Injectable 20 milliGRAM(s) IV Push two times a day  fluticasone propionate   220 MICROgram(s) HFA Inhaler 1 Puff(s) Inhalation two times a day  sodium chloride 0.9%. 1000 milliLiter(s) (10 mL/Hr) IV Continuous <Continuous>  aspirin Suppository 300 milliGRAM(s) Rectal daily  ALBUTerol/ipratropium for Nebulization 3 milliLiter(s) Nebulizer every 6 hours  dornase ziyad Solution 2.5 milliGRAM(s) Inhalation two times a day  fosphenytoin IVPB 200 milliGRAM(s) PE IV Intermittent every 12 hours  nystatin Powder 1 Application(s) Topical two times a day  chlorhexidine 4% Liquid 1 Application(s) Topical daily  furosemide   Injectable 20 milliGRAM(s) IV Push once    MEDICATIONS  (PRN):  guaiFENesin    Syrup 200 milliGRAM(s) Oral every 6 hours PRN Cough  HYDROcodone/homatropine Syrup 5 milliLiter(s) Oral every 4 hours PRN Cough  melatonin 3 milliGRAM(s) Oral at bedtime PRN Insomnia  LORazepam   Injectable 0.25 milliGRAM(s) IV Push two times a day PRN Anxiety  sodium chloride 3%  Inhalation 4 milliLiter(s) Inhalation every 6 hours PRN respiratory congestion      Vital Signs Last 24 Hrs  T(C): 36.4 (01 Oct 2017 08:00), Max: 36.6 (30 Sep 2017 20:00)  T(F): 97.6 (01 Oct 2017 08:00), Max: 97.9 (30 Sep 2017 20:00)  HR: 94 (01 Oct 2017 08:00) (66 - 94)  BP: 131/66 (01 Oct 2017 08:00) (125/57 - 133/65)  BP(mean): --  RR: 18 (01 Oct 2017 08:00) (18 - 18)  SpO2: 100% (01 Oct 2017 08:00) (97% - 100%)  nc/at  s1s2  cta  soft, nt, nd no guarding or rebound  no c/c/e    CBC Full  -  ( 30 Sep 2017 06:30 )  WBC Count : 9.89 K/uL  Hemoglobin : 10.2 g/dL  Hematocrit : 32.4 %  Platelet Count - Automated : 226 K/uL  Mean Cell Volume : 93.4 fL  Mean Cell Hemoglobin : 29.4 pg  Mean Cell Hemoglobin Concentration : 31.5 %  Auto Neutrophil # : x  Auto Lymphocyte # : x  Auto Monocyte # : x  Auto Eosinophil # : x  Auto Basophil # : x  Auto Neutrophil % : x  Auto Lymphocyte % : x  Auto Monocyte % : x  Auto Eosinophil % : x  Auto Basophil % : x    09-30    138  |  102  |  15  ----------------------------<  91  4.6   |  27  |  0.49<L>    Ca    8.6      30 Sep 2017 06:30  Phos  2.9     09-30  Mg     2.1     09-30

## 2017-10-01 NOTE — PROGRESS NOTE ADULT - ASSESSMENT
82M esophageal ca s/p chemo/RT followed by [place] esophagogastrectomy with feeding J tube. Complicated by leak managed conservatively.    PLAN:  - J-tube feeds at goal  - Monitor respiratory status  - PT / OOB  - Incentive spirometry  - Care per thoracic surgery  - Patient seen and examined with Dr. Collins Ramsay, PGY-1  D Team Surgery [id number]

## 2017-10-02 ENCOUNTER — TRANSCRIPTION ENCOUNTER (OUTPATIENT)
Age: 82
End: 2017-10-02

## 2017-10-02 PROCEDURE — 74000: CPT | Mod: 26

## 2017-10-02 RX ORDER — TAMSULOSIN HYDROCHLORIDE 0.4 MG/1
0.4 CAPSULE ORAL AT BEDTIME
Qty: 0 | Refills: 0 | Status: DISCONTINUED | OUTPATIENT
Start: 2017-10-02 | End: 2017-10-04

## 2017-10-02 RX ORDER — FOLIC ACID 0.8 MG
1 TABLET ORAL DAILY
Qty: 0 | Refills: 0 | Status: DISCONTINUED | OUTPATIENT
Start: 2017-10-02 | End: 2017-10-04

## 2017-10-02 RX ORDER — ASPIRIN AND DIPYRIDAMOLE 25; 200 MG/1; MG/1
1 CAPSULE, EXTENDED RELEASE ORAL
Qty: 0 | Refills: 0 | Status: DISCONTINUED | OUTPATIENT
Start: 2017-10-02 | End: 2017-10-04

## 2017-10-02 RX ORDER — ALPRAZOLAM 0.25 MG
0.5 TABLET ORAL
Qty: 0 | Refills: 0 | Status: DISCONTINUED | OUTPATIENT
Start: 2017-10-02 | End: 2017-10-04

## 2017-10-02 RX ORDER — LIDOCAINE HCL 20 MG/ML
20 VIAL (ML) INJECTION ONCE
Qty: 0 | Refills: 0 | Status: DISCONTINUED | OUTPATIENT
Start: 2017-10-02 | End: 2017-10-04

## 2017-10-02 RX ORDER — LOSARTAN POTASSIUM 100 MG/1
50 TABLET, FILM COATED ORAL DAILY
Qty: 0 | Refills: 0 | Status: DISCONTINUED | OUTPATIENT
Start: 2017-10-02 | End: 2017-10-04

## 2017-10-02 RX ORDER — AMLODIPINE BESYLATE 2.5 MG/1
5 TABLET ORAL DAILY
Qty: 0 | Refills: 0 | Status: DISCONTINUED | OUTPATIENT
Start: 2017-10-02 | End: 2017-10-04

## 2017-10-02 RX ORDER — MONTELUKAST 4 MG/1
10 TABLET, CHEWABLE ORAL DAILY
Qty: 0 | Refills: 0 | Status: DISCONTINUED | OUTPATIENT
Start: 2017-10-02 | End: 2017-10-04

## 2017-10-02 RX ORDER — FINASTERIDE 5 MG/1
5 TABLET, FILM COATED ORAL DAILY
Qty: 0 | Refills: 0 | Status: DISCONTINUED | OUTPATIENT
Start: 2017-10-02 | End: 2017-10-04

## 2017-10-02 RX ORDER — DOCUSATE SODIUM 100 MG
100 CAPSULE ORAL
Qty: 0 | Refills: 0 | Status: DISCONTINUED | OUTPATIENT
Start: 2017-10-02 | End: 2017-10-04

## 2017-10-02 RX ORDER — SIMVASTATIN 20 MG/1
40 TABLET, FILM COATED ORAL AT BEDTIME
Qty: 0 | Refills: 0 | Status: DISCONTINUED | OUTPATIENT
Start: 2017-10-02 | End: 2017-10-04

## 2017-10-02 RX ORDER — PYRIDOXINE HCL (VITAMIN B6) 100 MG
100 TABLET ORAL DAILY
Qty: 0 | Refills: 0 | Status: DISCONTINUED | OUTPATIENT
Start: 2017-10-02 | End: 2017-10-04

## 2017-10-02 RX ORDER — PREGABALIN 225 MG/1
500 CAPSULE ORAL DAILY
Qty: 0 | Refills: 0 | Status: DISCONTINUED | OUTPATIENT
Start: 2017-10-02 | End: 2017-10-04

## 2017-10-02 RX ADMIN — Medication 20 MILLIGRAM(S): at 05:14

## 2017-10-02 RX ADMIN — Medication 1 PUFF(S): at 22:08

## 2017-10-02 RX ADMIN — FINASTERIDE 5 MILLIGRAM(S): 5 TABLET, FILM COATED ORAL at 16:30

## 2017-10-02 RX ADMIN — AMLODIPINE BESYLATE 5 MILLIGRAM(S): 2.5 TABLET ORAL at 16:30

## 2017-10-02 RX ADMIN — SODIUM CHLORIDE 10 MILLILITER(S): 9 INJECTION INTRAMUSCULAR; INTRAVENOUS; SUBCUTANEOUS at 08:04

## 2017-10-02 RX ADMIN — Medication 300 MILLIGRAM(S): at 12:03

## 2017-10-02 RX ADMIN — TAMSULOSIN HYDROCHLORIDE 0.4 MILLIGRAM(S): 0.4 CAPSULE ORAL at 22:08

## 2017-10-02 RX ADMIN — Medication 3 MILLILITER(S): at 22:15

## 2017-10-02 RX ADMIN — LOSARTAN POTASSIUM 50 MILLIGRAM(S): 100 TABLET, FILM COATED ORAL at 16:30

## 2017-10-02 RX ADMIN — MONTELUKAST 10 MILLIGRAM(S): 4 TABLET, CHEWABLE ORAL at 16:30

## 2017-10-02 RX ADMIN — SIMVASTATIN 40 MILLIGRAM(S): 20 TABLET, FILM COATED ORAL at 22:08

## 2017-10-02 RX ADMIN — NYSTATIN CREAM 1 APPLICATION(S): 100000 CREAM TOPICAL at 05:15

## 2017-10-02 RX ADMIN — Medication 1 PUFF(S): at 08:45

## 2017-10-02 RX ADMIN — PANTOPRAZOLE SODIUM 40 MILLIGRAM(S): 20 TABLET, DELAYED RELEASE ORAL at 05:14

## 2017-10-02 RX ADMIN — Medication 3 MILLILITER(S): at 04:05

## 2017-10-02 RX ADMIN — Medication 0.5 MILLIGRAM(S): at 18:48

## 2017-10-02 RX ADMIN — FOSPHENYTOIN 108 MILLIGRAM(S) PE: 50 INJECTION INTRAMUSCULAR; INTRAVENOUS at 06:45

## 2017-10-02 RX ADMIN — ASPIRIN AND DIPYRIDAMOLE 1 CAPSULE(S): 25; 200 CAPSULE, EXTENDED RELEASE ORAL at 18:48

## 2017-10-02 RX ADMIN — Medication 3 MILLILITER(S): at 10:32

## 2017-10-02 RX ADMIN — Medication 100 MILLIGRAM(S): at 12:03

## 2017-10-02 RX ADMIN — Medication 200 MILLIGRAM(S): at 18:48

## 2017-10-02 RX ADMIN — Medication 3 MILLILITER(S): at 16:07

## 2017-10-02 RX ADMIN — Medication 1 MILLIGRAM(S): at 12:03

## 2017-10-02 RX ADMIN — Medication 5 MILLIGRAM(S): at 16:30

## 2017-10-02 RX ADMIN — NYSTATIN CREAM 1 APPLICATION(S): 100000 CREAM TOPICAL at 18:48

## 2017-10-02 RX ADMIN — PREGABALIN 500 MICROGRAM(S): 225 CAPSULE ORAL at 12:03

## 2017-10-02 RX ADMIN — DORNASE ALFA 2.5 MILLIGRAM(S): 1 SOLUTION RESPIRATORY (INHALATION) at 10:06

## 2017-10-02 NOTE — DISCHARGE NOTE ADULT - PLAN OF CARE
s/p esophagectomy. Goal is continued wound healing. Walk 4-5 x per day. Increase as tolerated. Continue to use incentive spirometer.  Follow above diet instructions. You can shower but can't get drain on rt. side of chest wet. Must cover to keep dry. J tube site can get wet. Flush J tube frequently to keep patent. Keep Rt. chest kyara to GINA bulb. Empty daily. Visiting nurse to do daily dressing change to that site.   See Dr. Weinstein next week. Call to make an apt. [phone]. Have a chest xray done prior to that apt.

## 2017-10-02 NOTE — DISCHARGE NOTE ADULT - CARE PROVIDER_API CALL
Christian Weinstein), Surgery; Thoracic Surgery  [address] [address] [place] [address]  [place] [address]  Phone: [phone]  Fax: [phone]

## 2017-10-02 NOTE — DISCHARGE NOTE ADULT - MEDICATION SUMMARY - MEDICATIONS TO TAKE
I will START or STAY ON the medications listed below when I get home from the hospital:    finasteride 5 mg oral tablet  -- 1 tab(s) by mouth once a day in pm  -- Indication: For bph    predniSONE 5 mg oral tablet  -- 1 tab(s) by mouth once a day in am  -- Indication: For steroid    olmesartan 20 mg oral tablet  -- 1 tab(s) by mouth once a day in pm  -- Indication: For blood pressure    tamsulosin 0.4 mg oral capsule  -- 1 cap(s) by mouth once a day in pm  -- Indication: For bph    Dilantin 100 mg oral capsule  -- 2 cap(s) by mouth 2 times a day  -- Indication: For dilantin    Imodium 2 mg oral capsule  -- 1 cap(s) by mouth every 4 hours, As Needed for diarrhea  -- Indication: For diarrhea, as needed    simvastatin 40 mg oral tablet  -- 1 tab(s) by mouth once a day in pm  -- Indication: For cholesterol    Zetia 10 mg oral tablet  -- 1 tab(s) by mouth once a day in pm  -- Indication: For cholestrol    aspirin-dipyridamole 25 mg-200 mg oral capsule, extended release  -- 1 cap(s) by mouth 2 times a day  -- Indication: For Anti platelet    ALPRAZolam 0.25 mg oral tablet  -- 2 tab(s) by mouth 2 times a day  -- Indication: For Anti anxiety    ProAir HFA 90 mcg/inh inhalation aerosol  -- 2 puff(s) inhaled 4 times a day, As Needed   -- Indication: For breathing    amLODIPine 5 mg oral tablet  -- 1 tab(s) by mouth once a day in pm  -- Indication: For blood pressure    guaiFENesin 100 mg/5 mL oral liquid  -- 10 milliliter(s) by mouth every 6 hours, As needed, Cough  -- Indication: For cough    montelukast 10 mg oral tablet  -- 1 tab(s) by mouth once a day in pm  -- Indication: For Allergies    Vitamin B-12 500 mcg oral tablet  -- 1 tab(s) by mouth once a day  -- Indication: For vitamin    folic acid 0.4 mg oral tablet  -- 1 tab(s) by mouth once a day  -- Indication: For vitamin    Vitamin B6 100 mg oral tablet  -- 1 tab(s) by mouth once a day  -- Indication: For vitamin

## 2017-10-02 NOTE — CHART NOTE - NSCHARTNOTEFT_GEN_A_CORE
NUTRITION FOLLOW-UP: Pt is currently receiving Jevity 1.2 via J tube over 24 hours. Pt is tolerating feeding well. It was requested that duration of feeding be decreased to 18 hours/day.     Weight: 73.3 kg (9/29/17)     Labs: Cr- .49     Current Diet: Jevity 1.2 at 60 ml/hour over 24 hours/day.    Enteral Recommendations: Jevity 1.2 at 80 ml/hour over 18 hours/day. Increase 10 ml/hour every 4 hours.    RD to Remain Available: Rebecca Fernandez MS, RDN, CDN pager [phone]     Additional Recommendations:   1) Monitor weight, labs, tube feeding tolerance and skin integrity.

## 2017-10-02 NOTE — PROGRESS NOTE ADULT - SUBJECTIVE AND OBJECTIVE BOX
Subjective: "I feel good, my cough is a little better" Denies CP/sob. Wearing O2 however RA sat >94%. Ambulating w assist, pt/family requesting home plan.     Vital Signs:  Vital Signs Last 24 Hrs  T(C): 36.6 (10-02-17 @ 11:32), Max: 36.8 (10-02-17 @ 04:52)  T(F): 97.8 (10-02-17 @ 11:32), Max: 98.2 (10-02-17 @ 04:52)  HR: 89 (10-02-17 @ 11:32) (81 - 91)  BP: 130/58 (10-02-17 @ 11:32) (121/51 - 138/69)  RR: 20 (10-02-17 @ 12:57) (16 - 20)  SpO2: 97% (10-02-17 @ 12:57) (97% - 100%) on (O2)    Telemetry/Alarms:  General: WN/WD NAD  Neurology: Awake, nonfocal, MAI x 4  Eyes: Scleras clear, PERRLA/ EOMI, Gross vision intact  ENT:Gross hearing intact, grossly patent pharynx, no stridor  Neck: Neck supple, trachea midline, No JVD,   Respiratory: some upper airway secretions.   CV: RRR, S1S2, no murmurs, rubs or gallops  Abdominal: Soft, NT, ND +BS, +BM, no diarrhea. Voiding.   Extremities: + LE 1+ bilat edema, + peripheral pulses  Skin: No Rashes, Hematoma, Ecchymosis  Lymphatic: No Neck, axilla, groin LAD  Psych: Oriented x 3, normal affect  Incisions: VATs and Abd incisions healing.   Tubes: Rt. Gregg to SS, 0cc/24hrs. J tube site.   Relevant labs, radiology and Medications reviewed            MEDICATIONS  (STANDING):  fluticasone propionate   220 MICROgram(s) HFA Inhaler 1 Puff(s) Inhalation two times a day  ALBUTerol/ipratropium for Nebulization 3 milliLiter(s) Nebulizer every 6 hours  dornase ziyad Solution 2.5 milliGRAM(s) Inhalation two times a day  nystatin Powder 1 Application(s) Topical two times a day  finasteride 5 milliGRAM(s) Oral daily  predniSONE   Tablet 5 milliGRAM(s) Oral daily  losartan 50 milliGRAM(s) Oral daily  tamsulosin 0.4 milliGRAM(s) Oral at bedtime  phenytoin   Capsule 200 milliGRAM(s) Oral two times a day  simvastatin 40 milliGRAM(s) Oral at bedtime  dipyridamole 200 mG/aspirin 25 mG 1 Capsule(s) Oral two times a day  ALPRAZolam 0.5 milliGRAM(s) Oral two times a day  amLODIPine   Tablet 5 milliGRAM(s) Oral daily  montelukast 10 milliGRAM(s) Oral daily  docusate sodium 100 milliGRAM(s) Oral two times a day  folic acid 1 milliGRAM(s) Oral daily  cyanocobalamin 500 MICROGram(s) Oral daily  pyridoxine 100 milliGRAM(s) Oral daily    MEDICATIONS  (PRN):  guaiFENesin    Syrup 200 milliGRAM(s) Oral every 6 hours PRN Cough  melatonin 3 milliGRAM(s) Oral at bedtime PRN Insomnia  sodium chloride 3%  Inhalation 4 milliLiter(s) Inhalation every 6 hours PRN respiratory congestion    Pertinent Physical Exam  I&O's Summary    01 Oct 2017 07:01  -  02 Oct 2017 07:00  --------------------------------------------------------  IN: 1170 mL / OUT: 2225 mL / NET: -1055 mL    02 Oct 2017 07:01  -  02 Oct 2017 13:50  --------------------------------------------------------  IN: 560 mL / OUT: 550 mL / NET: 10 mL        Assessment  82y Male  w/ PAST MEDICAL & SURGICAL HISTORY:  Coronary artery disease: s/p 3 stents on June 30, 2017 at [place] Dr. Lavelle Reid  OA (osteoarthritis) of knee: right  Former smoker, stopped smoking in distant past  Rheumatoid arthritis  Seizure disorder: 1 episode approximately 15 yrs ago  Asthma  Hyperlipidemia  BPH (benign prostatic hyperplasia)  HTN (hypertension)  Esophagus cancer  Chronic allergic rhinitis  BCC (basal cell carcinoma): skin  Cerebrovascular accident (CVA)  Anxiety  Anastomotic leak following esophagectomy: Maryjane logan esophagectomy  History of tonsillectomy  H/O heart artery stent: June 30, 2017  Knee arthropathy: left  History of total hip replacement: left  History of hernia repair  admitted with complaints of Patient is a 82y old  Male who presents with a chief complaint of feeding tube clogged s/p maryjane logan (21 Sep 2017 04:19)  Pt readmitted from rehab found to have persistent esophageal leak. PTC placed and since removed. Clay remains. Pt NPO w TFs. Pulmonary toilet.  Completed course of antibiotics.     PLAN  Neuro: Pain management  Pulm: Encourage coughing, deep breathing and use of incentive spirometry. Wean off supplemental oxygen as able. Should not need home O2.  Daily CXR.   Cardio: Monitor telemetry/alarms. Edema improved after Lasix.   GI: Will cont NPO but will allow sips of water for meds po. No plans for Ba Swa per Dr. Weinstein, pt. has known leak. Will change TF to 18hrs. course. Pt.s only form of nutrition because of Esophageal leak is the TF via the J tube. Will remain NPO. TF cannot be bolus due to it being a J tube. Will cont Rt. Chest gregg to allow space to drain, cont to CLAY to SS.   Renal: monitor urine output, supplement electrolytes as needed  Vasc: Heparin SC/SCDs for DVT prophylaxis  Heme: Stable H/H. .   ID: Off antibiotics. Stable.  Therapy: OOB/ambulate  Tubes: Monitor Chest tube output  Disposition: Aim to D/C to home tomorrow.   Discussed with Cardiothoracic Team at AM rounds. Subjective: "I feel good, my cough is a little better" Denies CP/sob. Wearing O2 however RA sat >94%. Ambulating w assist, pt/family requesting home plan.     Vital Signs:  Vital Signs Last 24 Hrs  T(C): 36.6 (10-02-17 @ 11:32), Max: 36.8 (10-02-17 @ 04:52)  T(F): 97.8 (10-02-17 @ 11:32), Max: 98.2 (10-02-17 @ 04:52)  HR: 89 (10-02-17 @ 11:32) (81 - 91)  BP: 130/58 (10-02-17 @ 11:32) (121/51 - 138/69)  RR: 20 (10-02-17 @ 12:57) (16 - 20)  SpO2: 97% (10-02-17 @ 12:57) (97% - 100%) on (O2)    Telemetry/Alarms:  General: WN/WD NAD  Neurology: Awake, nonfocal, MAI x 4  Eyes: Scleras clear, PERRLA/ EOMI, Gross vision intact  ENT:Gross hearing intact, grossly patent pharynx, no stridor  Neck: Neck supple, trachea midline, No JVD,   Respiratory: some upper airway secretions.   CV: RRR, S1S2, no murmurs, rubs or gallops  Abdominal: Soft, NT, ND +BS, +BM, no diarrhea. Voiding.   Extremities: + LE 1+ bilat edema, + peripheral pulses  Skin: No Rashes, Hematoma, Ecchymosis  Lymphatic: No Neck, axilla, groin LAD  Psych: Oriented x 3, normal affect  Incisions: VATs and Abd incisions healing.   Tubes: Rt. Gregg to SS, 0cc/24hrs. J tube site.   Relevant labs, radiology and Medications reviewed            MEDICATIONS  (STANDING):  fluticasone propionate   220 MICROgram(s) HFA Inhaler 1 Puff(s) Inhalation two times a day  ALBUTerol/ipratropium for Nebulization 3 milliLiter(s) Nebulizer every 6 hours  dornase ziyad Solution 2.5 milliGRAM(s) Inhalation two times a day  nystatin Powder 1 Application(s) Topical two times a day  finasteride 5 milliGRAM(s) Oral daily  predniSONE   Tablet 5 milliGRAM(s) Oral daily  losartan 50 milliGRAM(s) Oral daily  tamsulosin 0.4 milliGRAM(s) Oral at bedtime  phenytoin   Capsule 200 milliGRAM(s) Oral two times a day  simvastatin 40 milliGRAM(s) Oral at bedtime  dipyridamole 200 mG/aspirin 25 mG 1 Capsule(s) Oral two times a day  ALPRAZolam 0.5 milliGRAM(s) Oral two times a day  amLODIPine   Tablet 5 milliGRAM(s) Oral daily  montelukast 10 milliGRAM(s) Oral daily  docusate sodium 100 milliGRAM(s) Oral two times a day  folic acid 1 milliGRAM(s) Oral daily  cyanocobalamin 500 MICROGram(s) Oral daily  pyridoxine 100 milliGRAM(s) Oral daily    MEDICATIONS  (PRN):  guaiFENesin    Syrup 200 milliGRAM(s) Oral every 6 hours PRN Cough  melatonin 3 milliGRAM(s) Oral at bedtime PRN Insomnia  sodium chloride 3%  Inhalation 4 milliLiter(s) Inhalation every 6 hours PRN respiratory congestion    Pertinent Physical Exam  I&O's Summary    01 Oct 2017 07:01  -  02 Oct 2017 07:00  --------------------------------------------------------  IN: 1170 mL / OUT: 2225 mL / NET: -1055 mL    02 Oct 2017 07:01  -  02 Oct 2017 13:50  --------------------------------------------------------  IN: 560 mL / OUT: 550 mL / NET: 10 mL        Assessment  82y Male  w/ PAST MEDICAL & SURGICAL HISTORY:  Coronary artery disease: s/p 3 stents on June 30, 2017 at [place] Dr. Lavelle Reid  OA (osteoarthritis) of knee: right  Former smoker, stopped smoking in distant past  Rheumatoid arthritis  Seizure disorder: 1 episode approximately 15 yrs ago  Asthma  Hyperlipidemia  BPH (benign prostatic hyperplasia)  HTN (hypertension)  Esophagus cancer  Chronic allergic rhinitis  BCC (basal cell carcinoma): skin  Cerebrovascular accident (CVA)  Anxiety  Anastomotic leak following esophagectomy: Maryjane logan esophagectomy  History of tonsillectomy  H/O heart artery stent: June 30, 2017  Knee arthropathy: left  History of total hip replacement: left  History of hernia repair  admitted with complaints of Patient is a 82y old  Male who presents with a chief complaint of feeding tube clogged s/p maryjane logan (21 Sep 2017 04:19)  Pt readmitted from rehab found to have persistent esophageal leak. PTC placed and since removed. Clay remains. Pt NPO w TFs. Pulmonary toilet.  Completed course of antibiotics.     PLAN  Neuro: Pain management  Pulm: Encourage coughing, deep breathing and use of incentive spirometry. Wean off supplemental oxygen as able. Should not need home O2.  Daily CXR.   Cardio: Monitor telemetry/alarms. Edema improved after Lasix.   GI: Will cont NPO but will allow sips of water for meds po. No plans for Ba Swa per Dr. Weinstein, pt. has known leak. Will change TF to 18hrs. course. BARTOLO=99 Pt.s only form of nutrition because of Esophageal leak is the TF via the J tube. Will remain NPO. TF cannot be bolus due to it being a J tube. Will cont Rt. Chest gregg to allow space to drain, cont to CLAY to SS.   Renal: monitor urine output, supplement electrolytes as needed  Vasc: Heparin SC/SCDs for DVT prophylaxis  Heme: Stable H/H. .   ID: Off antibiotics. Stable.  Therapy: OOB/ambulate  Tubes: Monitor Chest tube output  Disposition: Aim to D/C to home tomorrow.   Discussed with Cardiothoracic Team at AM rounds.

## 2017-10-02 NOTE — PROGRESS NOTE ADULT - SUBJECTIVE AND OBJECTIVE BOX
Follow-up Pulm Progress Note    Appears comfortable. Sitting in chair.     Medications:  MEDICATIONS  (STANDING):  fluticasone propionate   220 MICROgram(s) HFA Inhaler 1 Puff(s) Inhalation two times a day  ALBUTerol/ipratropium for Nebulization 3 milliLiter(s) Nebulizer every 6 hours  dornase ziyad Solution 2.5 milliGRAM(s) Inhalation two times a day  nystatin Powder 1 Application(s) Topical two times a day  finasteride 5 milliGRAM(s) Oral daily  predniSONE   Tablet 5 milliGRAM(s) Oral daily  losartan 50 milliGRAM(s) Oral daily  tamsulosin 0.4 milliGRAM(s) Oral at bedtime  phenytoin   Capsule 200 milliGRAM(s) Oral two times a day  simvastatin 40 milliGRAM(s) Oral at bedtime  dipyridamole 200 mG/aspirin 25 mG 1 Capsule(s) Oral two times a day  ALPRAZolam 0.5 milliGRAM(s) Oral two times a day  amLODIPine   Tablet 5 milliGRAM(s) Oral daily  montelukast 10 milliGRAM(s) Oral daily  docusate sodium 100 milliGRAM(s) Oral two times a day  folic acid 1 milliGRAM(s) Oral daily  cyanocobalamin 500 MICROGram(s) Oral daily  pyridoxine 100 milliGRAM(s) Oral daily    MEDICATIONS  (PRN):  guaiFENesin    Syrup 200 milliGRAM(s) Oral every 6 hours PRN Cough  melatonin 3 milliGRAM(s) Oral at bedtime PRN Insomnia  sodium chloride 3%  Inhalation 4 milliLiter(s) Inhalation every 6 hours PRN respiratory congestion    Vital Signs Last 24 Hrs  T(C): 36.6 (02 Oct 2017 11:32), Max: 36.8 (02 Oct 2017 04:52)  T(F): 97.8 (02 Oct 2017 11:32), Max: 98.2 (02 Oct 2017 04:52)  HR: 89 (02 Oct 2017 11:32) (81 - 91)  BP: 130/58 (02 Oct 2017 11:32) (121/51 - 138/69)  BP(mean): --  RR: 20 (02 Oct 2017 12:57) (16 - 20)  SpO2: 97% (02 Oct 2017 12:57) (97% - 100%)          10-01 @ 07:01  -  10-02 @ 07:00  --------------------------------------------------------  IN: 1170 mL / OUT: 2225 mL / NET: -1055 mL          LABS:    CAPILLARY BLOOD GLUCOSE    CULTURES: (if applicable)    Physical Examination:  PULM: Decreased BS at right base  CVS: S1, S2 heard    RADIOLOGY REVIEWED  CXR:     CT chest:    TTE:

## 2017-10-02 NOTE — DISCHARGE NOTE ADULT - PATIENT PORTAL LINK FT
“You can access the Follow[facility] Patient Portal, offered by [facility], by registering with the following website: [email or web address]”

## 2017-10-02 NOTE — DISCHARGE NOTE ADULT - HOME CARE AGENCY
[facility] [phone] initial visit will be day after discharge home. A nurse will call prior to the home visit.

## 2017-10-02 NOTE — DISCHARGE NOTE ADULT - MEDICATION SUMMARY - MEDICATIONS TO STOP TAKING
I will STOP taking the medications listed below when I get home from the hospital:    docusate sodium 100 mg oral capsule  -- 1 cap(s) by mouth 2 times a day

## 2017-10-02 NOTE — DISCHARGE NOTE ADULT - CARE PLAN
Principal Discharge DX:	Esophagus cancer  Goal:	s/p esophagectomy. Goal is continued wound healing.  Instructions for follow-up, activity and diet:	Walk 4-5 x per day. Increase as tolerated. Continue to use incentive spirometer.  Follow above diet instructions. You can shower but can't get drain on rt. side of chest wet. Must cover to keep dry. J tube site can get wet. Flush J tube frequently to keep patent.  Secondary Diagnosis:	Anastomotic leak following esophagectomy  Instructions for follow-up, activity and diet:	Keep Rt. chest kyara to GINA bulb. Empty daily. Visiting nurse to do daily dressing change to that site.   See Dr. Weinstein next week. Call to make an apt. [phone]. Have a chest xray done prior to that apt.

## 2017-10-02 NOTE — PROGRESS NOTE ADULT - SUBJECTIVE AND OBJECTIVE BOX
MEJIA LATIF:[id number],   82yMale followed for: esophageal ca, leak  penicillin (Rash)    PAST MEDICAL & SURGICAL HISTORY:  Coronary artery disease: s/p 3 stents on June 30, 2017 at [place] Dr. Lavelle Reid  OA (osteoarthritis) of knee: right  Former smoker, stopped smoking in distant past  Rheumatoid arthritis  Seizure disorder: 1 episode approximately 15 yrs ago  Asthma  Hyperlipidemia  BPH (benign prostatic hyperplasia)  HTN (hypertension)  Esophagus cancer  Chronic allergic rhinitis  BCC (basal cell carcinoma): skin  Cerebrovascular accident (CVA)  Anxiety  Anastomotic leak following esophagectomy: Goree logan esophagectomy  History of tonsillectomy  H/O heart artery stent: June 30, 2017  Knee arthropathy: left  History of total hip replacement: left  History of hernia repair    FAMILY HISTORY:  Family history of heart attack (Sibling)  Family history of pulmonary embolism: father @ 49 yr old  FH: CAD (coronary artery disease)    MEDICATIONS  (STANDING):  folic acid Injectable 1 milliGRAM(s) IV Push daily  pyridoxine Injectable 100 milliGRAM(s) IV Push daily  cyanocobalamin Injectable 500 MICROGram(s) SubCutaneous daily  pantoprazole  Injectable 40 milliGRAM(s) IV Push two times a day  methylPREDNISolone sodium succinate Injectable 20 milliGRAM(s) IV Push two times a day  fluticasone propionate   220 MICROgram(s) HFA Inhaler 1 Puff(s) Inhalation two times a day  sodium chloride 0.9%. 1000 milliLiter(s) (10 mL/Hr) IV Continuous <Continuous>  aspirin Suppository 300 milliGRAM(s) Rectal daily  ALBUTerol/ipratropium for Nebulization 3 milliLiter(s) Nebulizer every 6 hours  dornase ziyad Solution 2.5 milliGRAM(s) Inhalation two times a day  fosphenytoin IVPB 200 milliGRAM(s) PE IV Intermittent every 12 hours  nystatin Powder 1 Application(s) Topical two times a day    MEDICATIONS  (PRN):  guaiFENesin    Syrup 200 milliGRAM(s) Oral every 6 hours PRN Cough  melatonin 3 milliGRAM(s) Oral at bedtime PRN Insomnia  LORazepam   Injectable 0.25 milliGRAM(s) IV Push two times a day PRN Anxiety  sodium chloride 3%  Inhalation 4 milliLiter(s) Inhalation every 6 hours PRN respiratory congestion      Vital Signs Last 24 Hrs  T(C): 36.5 (02 Oct 2017 07:24), Max: 36.8 (02 Oct 2017 04:52)  T(F): 97.7 (02 Oct 2017 07:24), Max: 98.2 (02 Oct 2017 04:52)  HR: 85 (02 Oct 2017 10:06) (81 - 91)  BP: 121/63 (02 Oct 2017 07:24) (121/51 - 138/69)  BP(mean): --  RR: 16 (02 Oct 2017 07:24) (16 - 18)  SpO2: 100% (02 Oct 2017 07:24) (97% - 100%)  nc/at  s1s2  cta  soft, nt, nd no guarding or rebound  no c/c/e

## 2017-10-02 NOTE — DISCHARGE NOTE ADULT - INSTRUCTIONS
Ok to only have sips of water to take your pills, otherwise, nothing by mouth. Continue Jevity Tube feeds at 60cc/hr via the J tube for 24hrs per day. Instill water 250cc every 4-6 hrs to keep hydrated and to keep tube patent. You may disconnect from tube feeds briefly if necessary.

## 2017-10-02 NOTE — PROGRESS NOTE ADULT - SUBJECTIVE AND OBJECTIVE BOX
Called to evaluate patient for jejunostomy tube displaced.  Patient seen, and J tube noted to be displaced by about 5 inches.    Case discussed with Dr. Cohen.  J tube advanced easily and reinforced with silk sutures after administration of local anesthesia.  Patient tolerated the procedure well.  We will confirm placement of jejunostomy tube this evening with a bedside J tube study prior to reinitiation of tube feeds.    JT Reyes General Surgery Resident PGY3 Contact # [phone]

## 2017-10-02 NOTE — PROGRESS NOTE ADULT - SUBJECTIVE AND OBJECTIVE BOX
Surgery Progress Note    S: Patient seen and examined. No acute events overnight. Pain well controlled with current regimen. Denies nausea/vomiting. Endorses passing gas and bowel movements.     O:  Vital Signs Last 24 Hrs  T(C): 36.5 (02 Oct 2017 07:24), Max: 36.8 (02 Oct 2017 04:52)  T(F): 97.7 (02 Oct 2017 07:24), Max: 98.2 (02 Oct 2017 04:52)  HR: 84 (02 Oct 2017 07:24) (81 - 91)  BP: 121/63 (02 Oct 2017 07:24) (121/51 - 138/69)  BP(mean): --  RR: 16 (02 Oct 2017 07:24) (16 - 18)  SpO2: 100% (02 Oct 2017 07:24) (97% - 100%)    I&O's Detail    01 Oct 2017 07:01  -  02 Oct 2017 07:00  --------------------------------------------------------  IN:    IV PiggyBack: 50 mL    Jevity: 1020 mL    sodium chloride 0.9%.: 100 mL  Total IN: 1170 mL    OUT:    Voided: 2225 mL  Total OUT: 2225 mL    Total NET: -1055 mL      02 Oct 2017 07:01  -  02 Oct 2017 08:10  --------------------------------------------------------  IN:    sodium chloride 0.9%.: 10 mL  Total IN: 10 mL    OUT:    Voided: 100 mL  Total OUT: 100 mL    Total NET: -90 mL          MEDICATIONS  (STANDING):  folic acid Injectable 1 milliGRAM(s) IV Push daily  pyridoxine Injectable 100 milliGRAM(s) IV Push daily  cyanocobalamin Injectable 500 MICROGram(s) SubCutaneous daily  pantoprazole  Injectable 40 milliGRAM(s) IV Push two times a day  methylPREDNISolone sodium succinate Injectable 20 milliGRAM(s) IV Push two times a day  fluticasone propionate   220 MICROgram(s) HFA Inhaler 1 Puff(s) Inhalation two times a day  sodium chloride 0.9%. 1000 milliLiter(s) (10 mL/Hr) IV Continuous <Continuous>  aspirin Suppository 300 milliGRAM(s) Rectal daily  ALBUTerol/ipratropium for Nebulization 3 milliLiter(s) Nebulizer every 6 hours  dornase ziyad Solution 2.5 milliGRAM(s) Inhalation two times a day  fosphenytoin IVPB 200 milliGRAM(s) PE IV Intermittent every 12 hours  nystatin Powder 1 Application(s) Topical two times a day  chlorhexidine 4% Liquid 1 Application(s) Topical daily    MEDICATIONS  (PRN):  guaiFENesin    Syrup 200 milliGRAM(s) Oral every 6 hours PRN Cough  melatonin 3 milliGRAM(s) Oral at bedtime PRN Insomnia  LORazepam   Injectable 0.25 milliGRAM(s) IV Push two times a day PRN Anxiety  sodium chloride 3%  Inhalation 4 milliLiter(s) Inhalation every 6 hours PRN respiratory congestion                  Physical Exam:  Gen: Laying in bed, NAD, alert and oriented.   Resp: Unlabored breathing  Abd: Soft, nontender, nondistended. Drain output is serous. J tube in place with no surrounding erythema.

## 2017-10-02 NOTE — PROGRESS NOTE ADULT - ASSESSMENT
82M esophageal ca s/p chemo/RT followed by [place] esophagogastrectomy with feeding J tube. Complicated by leak managed conservatively.    PLAN:  - J-tube feeds at goal  - Monitor respiratory status  - PT / OOB  - Incentive spirometry  - Care per thoracic surgery    Kamlesh Ramsay, PGY-1  D Team Surgery [id number] 82M esophageal ca s/p chemo/RT followed by [place]-T[place] esophagogastrectomy with feeding J tube. Complicated by leak managed conservatively.    PLAN:  - Barium Swallow request by   - J-tube feeds at goal  - Monitor respiratory status  - Physical Therapy recommended  - Incentive spirometry  - Care per thoracic surgery    Kamlesh Ramsay, PGY-1  D Team Surgery [id number] 82M esophageal ca s/p chemo/RT followed by [place]-Trent esophagogastrectomy with feeding J tube. Complicated by leak managed conservatively.    PLAN:  - Possible Barium Swallow   - J-tube feeds at goal  - Monitor respiratory status  - Physical Therapy recommended  - Incentive spirometry  - Care per thoracic surgery    Kamlesh Ramsay, PGY-1  D Team Surgery [id number]

## 2017-10-02 NOTE — DISCHARGE NOTE ADULT - HOSPITAL COURSE
82 M presented 9/2017 to [facility] with a non-functioning J tube and SOB. 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 Dyer and Dr. Christian Weinstein on 8/14/17. After surgery, patient was found to have an anastomotic leak and was treated conservatively with NPO, antibiotics, chest tube drainage, and J tube feeding. Patient was eventually discharged to rehab. On day of admission, the patient's J tube was unable to be used, and he was sent to the ED for evaluation. Patient also complained of SOB, but no chest pain or abdominal pain.. Patient was taking medications by mouth, but was only getting nutrition via his J tube. Esophagram done on 9/19/17 showed a leak at the distal site just above the hemidiaphragm into the pleural space. Upon admission, the patient was found to have a Left pleural effusion. He had a pigtail placed and developed respiratory distress. Patient was transferred to the SICU where he was intubated. Patient also with right GINA drain.  Patient was then ransferred to [facility] CTICU for optimal management. He was soon extubated and was transferred to [address] [place] [address] discharged His tube was unclogged and cleared for use. 82 M presented 9/2017 to [facility] with a non-functioning J tube and SOB. 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 Dyer and Dr. Christian Weinstein on 8/14/17. After surgery, patient was found to have an anastomotic leak and was treated conservatively with NPO, antibiotics, chest tube drainage, and J tube feeding. Patient was eventually discharged to rehab. On day of admission, the patient's J tube was unable to be used, and he was sent to the ED for evaluation. Patient also complained of SOB, but no chest pain or abdominal pain.. Patient was taking medications by mouth, but was only getting nutrition via his J tube. Esophagram done on 9/19/17 showed a leak at the distal site just above the hemidiaphragm into the pleural space. Upon admission, the patient was found to have a Left pleural effusion. He had a pigtail placed and developed respiratory distress. Patient was transferred to the SICU where he was intubated. Patient also with right GINA drain.  Patient was then ransferred to [facility] CTICU for optimal management. He was soon extubated and was transferred to [address] [place] [address] discharged His tube was unclogged and cleared for use.  Pt. developed some diarrhea yesterday into today on higher TF dose. C diff neg. Rate changed back to 60cc/hr x 24hrs. Diarrhea improved, given immodium w relief. All VNS arranged. Pt., family wanting to go home. Labs, CXR and VS stable. All wounds healing. Rt. kyara remains to . Cleared for discharge by Dr. Weinstein.

## 2017-10-02 NOTE — DISCHARGE NOTE ADULT - NS AS ACTIVITY OBS
Walking-Indoors allowed/Walking-Outdoors allowed/Showering allowed/Stairs allowed/Driving allowed/Do not make important decisions/Do not drive or operate machinery/No Heavy lifting/straining

## 2017-10-02 NOTE — DISCHARGE NOTE ADULT - CONDITIONS AT DISCHARGE
Pt A&O x4. Pt in stable condition at time of discharge. No complaints of shortness of breath, pain, chest pain. Discharge paperwork given and understood. Visiting RN and tube feed suppliers set up for home.

## 2017-10-03 LAB
BUN SERPL-MCNC: 14 MG/DL — SIGNIFICANT CHANGE UP (ref 7–23)
CALCIUM SERPL-MCNC: 8.7 MG/DL — SIGNIFICANT CHANGE UP (ref 8.4–10.5)
CHLORIDE SERPL-SCNC: 102 MMOL/L — SIGNIFICANT CHANGE UP (ref 98–107)
CO2 SERPL-SCNC: 29 MMOL/L — SIGNIFICANT CHANGE UP (ref 22–31)
CREAT SERPL-MCNC: 0.47 MG/DL — LOW (ref 0.5–1.3)
GLUCOSE SERPL-MCNC: 101 MG/DL — HIGH (ref 70–99)
HCT VFR BLD CALC: 30.5 % — LOW (ref 39–50)
HGB BLD-MCNC: 9.7 G/DL — LOW (ref 13–17)
MCHC RBC-ENTMCNC: 29.8 PG — SIGNIFICANT CHANGE UP (ref 27–34)
MCHC RBC-ENTMCNC: 31.8 % — LOW (ref 32–36)
MCV RBC AUTO: 93.8 FL — SIGNIFICANT CHANGE UP (ref 80–100)
NRBC # FLD: 0 — SIGNIFICANT CHANGE UP
PLATELET # BLD AUTO: 205 K/UL — SIGNIFICANT CHANGE UP (ref 150–400)
PMV BLD: 9.6 FL — SIGNIFICANT CHANGE UP (ref 7–13)
POTASSIUM SERPL-MCNC: 4.3 MMOL/L — SIGNIFICANT CHANGE UP (ref 3.5–5.3)
POTASSIUM SERPL-SCNC: 4.3 MMOL/L — SIGNIFICANT CHANGE UP (ref 3.5–5.3)
RBC # BLD: 3.25 M/UL — LOW (ref 4.2–5.8)
RBC # FLD: 14.7 % — HIGH (ref 10.3–14.5)
SODIUM SERPL-SCNC: 140 MMOL/L — SIGNIFICANT CHANGE UP (ref 135–145)
WBC # BLD: 8.22 K/UL — SIGNIFICANT CHANGE UP (ref 3.8–10.5)
WBC # FLD AUTO: 8.22 K/UL — SIGNIFICANT CHANGE UP (ref 3.8–10.5)

## 2017-10-03 PROCEDURE — 71010: CPT | Mod: 26

## 2017-10-03 RX ORDER — ACETAMINOPHEN 500 MG
650 TABLET ORAL ONCE
Qty: 0 | Refills: 0 | Status: COMPLETED | OUTPATIENT
Start: 2017-10-03 | End: 2017-10-03

## 2017-10-03 RX ORDER — IPRATROPIUM/ALBUTEROL SULFATE 18-103MCG
3 AEROSOL WITH ADAPTER (GRAM) INHALATION EVERY 6 HOURS
Qty: 0 | Refills: 0 | Status: DISCONTINUED | OUTPATIENT
Start: 2017-10-03 | End: 2017-10-04

## 2017-10-03 RX ADMIN — Medication 5 MILLIGRAM(S): at 06:36

## 2017-10-03 RX ADMIN — AMLODIPINE BESYLATE 5 MILLIGRAM(S): 2.5 TABLET ORAL at 06:36

## 2017-10-03 RX ADMIN — ASPIRIN AND DIPYRIDAMOLE 1 CAPSULE(S): 25; 200 CAPSULE, EXTENDED RELEASE ORAL at 17:46

## 2017-10-03 RX ADMIN — FINASTERIDE 5 MILLIGRAM(S): 5 TABLET, FILM COATED ORAL at 13:10

## 2017-10-03 RX ADMIN — Medication 650 MILLIGRAM(S): at 00:20

## 2017-10-03 RX ADMIN — Medication 1 MILLIGRAM(S): at 13:10

## 2017-10-03 RX ADMIN — NYSTATIN CREAM 1 APPLICATION(S): 100000 CREAM TOPICAL at 06:37

## 2017-10-03 RX ADMIN — PREGABALIN 500 MICROGRAM(S): 225 CAPSULE ORAL at 13:10

## 2017-10-03 RX ADMIN — DORNASE ALFA 2.5 MILLIGRAM(S): 1 SOLUTION RESPIRATORY (INHALATION) at 10:08

## 2017-10-03 RX ADMIN — Medication 200 MILLIGRAM(S): at 06:36

## 2017-10-03 RX ADMIN — Medication 3 MILLILITER(S): at 10:23

## 2017-10-03 RX ADMIN — ASPIRIN AND DIPYRIDAMOLE 1 CAPSULE(S): 25; 200 CAPSULE, EXTENDED RELEASE ORAL at 06:36

## 2017-10-03 RX ADMIN — MONTELUKAST 10 MILLIGRAM(S): 4 TABLET, CHEWABLE ORAL at 13:11

## 2017-10-03 RX ADMIN — TAMSULOSIN HYDROCHLORIDE 0.4 MILLIGRAM(S): 0.4 CAPSULE ORAL at 23:09

## 2017-10-03 RX ADMIN — Medication 650 MILLIGRAM(S): at 00:40

## 2017-10-03 RX ADMIN — Medication 0.5 MILLIGRAM(S): at 06:36

## 2017-10-03 RX ADMIN — Medication 1 PUFF(S): at 13:11

## 2017-10-03 RX ADMIN — Medication 200 MILLIGRAM(S): at 17:46

## 2017-10-03 RX ADMIN — SIMVASTATIN 40 MILLIGRAM(S): 20 TABLET, FILM COATED ORAL at 23:09

## 2017-10-03 RX ADMIN — LOSARTAN POTASSIUM 50 MILLIGRAM(S): 100 TABLET, FILM COATED ORAL at 06:36

## 2017-10-03 RX ADMIN — Medication 0.5 MILLIGRAM(S): at 17:46

## 2017-10-03 RX ADMIN — Medication 100 MILLIGRAM(S): at 13:10

## 2017-10-03 RX ADMIN — NYSTATIN CREAM 1 APPLICATION(S): 100000 CREAM TOPICAL at 17:46

## 2017-10-03 NOTE — PROGRESS NOTE ADULT - PROBLEM SELECTOR PLAN 1
keep o2 sat >=92% with prvc  lighten sedation  ps trials  wean to extubate as able
doing well on NC
doing well on RA
extubated
doing well on RA

## 2017-10-03 NOTE — PROGRESS NOTE ADULT - ASSESSMENT
82M esophageal ca s/p chemo/RT followed by Reinbeck-Trent esophagogastrectomy with feeding J tube. Complicated by leak managed conservatively.    PLAN:  - J-tube feeds at goal  - Monitor respiratory status  - Physical Therapy recommended  - Incentive spirometry  - D/c planning for tomorrow  - Care per thoracic surgery    Kamlesh Ramsay, PGY-1  D Team Surgery [id number]

## 2017-10-03 NOTE — PROGRESS NOTE ADULT - SUBJECTIVE AND OBJECTIVE BOX
Subjective:    Vital Signs:  Vital Signs Last 24 Hrs  T(C): 36.9 (10-03-17 @ 07:59), Max: 36.9 (10-03-17 @ 07:59)  T(F): 98.5 (10-03-17 @ 07:59), Max: 98.5 (10-03-17 @ 07:59)  HR: 84 (10-03-17 @ 07:59) (82 - 89)  BP: 128/70 (10-03-17 @ 07:59) (128/64 - 134/66)  RR: 18 (10-03-17 @ 07:59) (16 - 20)  SpO2: 100% (10-03-17 @ 07:59) (95% - 100%) on (O2)    Telemetry/Alarms:  General: WN/WD NAD  Neurology: Awake, nonfocal, MAI x 4  Eyes: Scleras clear, PERRLA/ EOMI, Gross vision intact  ENT:Gross hearing intact, grossly patent pharynx, no stridor  Neck: Neck supple, trachea midline, No JVD,   Respiratory: CTA B/L, No wheezing, rales, rhonchi  CV: RRR, S1S2, no murmurs, rubs or gallops  Abdominal: Soft, NT, ND +BS,   Extremities: No edema, + peripheral pulses  Skin: No Rashes, Hematoma, Ecchymosis  Lymphatic: No Neck, axilla, groin LAD  Psych: Oriented x 3, normal affect  Incisions:   Tubes:  Relevant labs, radiology and Medications reviewed                        9.7    8.22  )-----------( 205      ( 03 Oct 2017 06:08 )             30.5     10-03    140  |  102  |  14  ----------------------------<  101<H>  4.3   |  29  |  0.47<L>    Ca    8.7      03 Oct 2017 06:08        MEDICATIONS  (STANDING):  ALBUTerol/ipratropium for Nebulization 3 milliLiter(s) Nebulizer every 6 hours  ALPRAZolam 0.5 milliGRAM(s) Oral two times a day  amLODIPine   Tablet 5 milliGRAM(s) Oral daily  cyanocobalamin 500 MICROGram(s) Oral daily  dipyridamole 200 mG/aspirin 25 mG 1 Capsule(s) Oral two times a day  docusate sodium 100 milliGRAM(s) Oral two times a day  dornase ziyad Solution 2.5 milliGRAM(s) Inhalation two times a day  finasteride 5 milliGRAM(s) Oral daily  fluticasone propionate   220 MICROgram(s) HFA Inhaler 1 Puff(s) Inhalation two times a day  folic acid 1 milliGRAM(s) Oral daily  lidocaine 1% Injectable 20 milliLiter(s) Local Injection once  losartan 50 milliGRAM(s) Oral daily  montelukast 10 milliGRAM(s) Oral daily  nystatin Powder 1 Application(s) Topical two times a day  phenytoin   Capsule 200 milliGRAM(s) Oral two times a day  predniSONE   Tablet 5 milliGRAM(s) Oral daily  pyridoxine 100 milliGRAM(s) Oral daily  simvastatin 40 milliGRAM(s) Oral at bedtime  tamsulosin 0.4 milliGRAM(s) Oral at bedtime    MEDICATIONS  (PRN):  guaiFENesin    Syrup 200 milliGRAM(s) Oral every 6 hours PRN Cough  melatonin 3 milliGRAM(s) Oral at bedtime PRN Insomnia  sodium chloride 3%  Inhalation 4 milliLiter(s) Inhalation every 6 hours PRN respiratory congestion    Pertinent Physical Exam  I&O's Summary    02 Oct 2017 07:01  -  03 Oct 2017 07:00  --------------------------------------------------------  IN: 820 mL / OUT: 1980 mL / NET: -1160 mL        Assessment  82y Male  w/ PAST MEDICAL & SURGICAL HISTORY:  Coronary artery disease: s/p 3 stents on June 30, 2017 at [place] Dr. Lavelle Reid  OA (osteoarthritis) of knee: right  Former smoker, stopped smoking in distant past  Rheumatoid arthritis  Seizure disorder: 1 episode approximately 15 yrs ago  Asthma  Hyperlipidemia  BPH (benign prostatic hyperplasia)  HTN (hypertension)  Esophagus cancer  Chronic allergic rhinitis  BCC (basal cell carcinoma): skin  Cerebrovascular accident (CVA)  Anxiety  Anastomotic leak following esophagectomy: [facility] logan esophagectomy  History of tonsillectomy  H/O heart artery stent: June 30, 2017  Knee arthropathy: left  History of total hip replacement: left  History of hernia repair  admitted with complaints of Patient is a 82y old  Male who presents with a chief complaint of feeding tube clogged s/p maryjane logan (21 Sep 2017 04:19)  .  On (Date), patient underwent Thoracostomy, tube  EGD (esophagogastroduodenoscopy)  . Postoperative course/issues:    PLAN  Neuro: Pain management  Pulm: Encourage coughing, deep breathing and use of incentive spirometry. Wean off supplemental oxygen as able. Daily CXR.   Cardio: Monitor telemetry/alarms  GI: Tolerating diet. Continue stool softeners.  Renal: monitor urine output, supplement electrolytes as needed  Vasc: Heparin SC/SCDs for DVT prophylaxis  Heme: Stable H/H. .   ID: Off antibiotics. Stable.  Therapy: OOB/ambulate  Tubes: Monitor Chest tube output  Disposition: Aim to D/C to home on  Discussed with Cardiothoracic Team at AM rounds. Subjective:    Vital Signs: Hemodynamically stable and afebrile  Vital Signs Last 24 Hrs  T(C): 36.9 (10-03-17 @ 07:59), Max: 36.9 (10-03-17 @ 07:59)  T(F): 98.5 (10-03-17 @ 07:59), Max: 98.5 (10-03-17 @ 07:59)  HR: 84 (10-03-17 @ 07:59) (82 - 89)  BP: 128/70 (10-03-17 @ 07:59) (128/64 - 134/66)  RR: 18 (10-03-17 @ 07:59) (16 - 20)  SpO2: 100% (10-03-17 @ 07:59) (95% - 100%) on (O2)    Telemetry/Alarms: Prolong P-R with blocked PACs  General: WN/WD NAD  Neurology: Awake, nonfocal, MAI x 4  Eyes: Scleras clear, PERRLA/ EOMI, Gross vision intact  ENT:Gross hearing intact, grossly patent pharynx, no stridor  Neck: Neck supple, trachea midline, No JVD,   Respiratory: CTA B/L, No wheezing, rales, rhonchi  CV: RRR, S1S2, no murmurs, rubs or gallops  Abdominal: Soft, NT, ND +BS,   Extremities: 4+dependent pedal edema, + peripheral pulses, no calf pain or tenderness  Skin: No Rashes, Hematoma, Ecchymosis  Lymphatic: No Neck, axilla, groin LAD  Psych: Oriented x 3, normal affect  Incisions: no s-s of infection  Tubes: Gregg drain -> GINA  Relevant labs, radiology->Bibasilar atelectasis again noted. Continued right mid lung opacity, possibly loculated pleural fluid  and Medications reviewed                        9.7    8.22  )-----------( 205      ( 03 Oct 2017 06:08 )             30.5     10-03    140  |  102  |  14  ----------------------------<  101<H>  4.3   |  29  |  0.47<L>    Ca    8.7      03 Oct 2017 06:08        MEDICATIONS  (STANDING):  ALBUTerol/ipratropium for Nebulization 3 milliLiter(s) Nebulizer every 6 hours  ALPRAZolam 0.5 milliGRAM(s) Oral two times a day  amLODIPine   Tablet 5 milliGRAM(s) Oral daily  cyanocobalamin 500 MICROGram(s) Oral daily  dipyridamole 200 mG/aspirin 25 mG 1 Capsule(s) Oral two times a day  docusate sodium 100 milliGRAM(s) Oral two times a day  dornase ziyad Solution 2.5 milliGRAM(s) Inhalation two times a day  finasteride 5 milliGRAM(s) Oral daily  fluticasone propionate   220 MICROgram(s) HFA Inhaler 1 Puff(s) Inhalation two times a day  folic acid 1 milliGRAM(s) Oral daily  lidocaine 1% Injectable 20 milliLiter(s) Local Injection once  losartan 50 milliGRAM(s) Oral daily  montelukast 10 milliGRAM(s) Oral daily  nystatin Powder 1 Application(s) Topical two times a day  phenytoin   Capsule 200 milliGRAM(s) Oral two times a day  predniSONE   Tablet 5 milliGRAM(s) Oral daily  pyridoxine 100 milliGRAM(s) Oral daily  simvastatin 40 milliGRAM(s) Oral at bedtime  tamsulosin 0.4 milliGRAM(s) Oral at bedtime    MEDICATIONS  (PRN):  guaiFENesin    Syrup 200 milliGRAM(s) Oral every 6 hours PRN Cough  melatonin 3 milliGRAM(s) Oral at bedtime PRN Insomnia  sodium chloride 3%  Inhalation 4 milliLiter(s) Inhalation every 6 hours PRN respiratory congestion    Pertinent Physical Exam  I&O's Summary    02 Oct 2017 07:01  -  03 Oct 2017 07:00  --------------------------------------------------------  IN: 820 mL / OUT: 1980 mL / NET: -1160 mL        Assessment  82y Male  w/ PAST MEDICAL & SURGICAL HISTORY:  Coronary artery disease: s/p 3 stents on June 30, 2017 at [place] Dr. Lavelle Reid  OA (osteoarthritis) of knee: right  Former smoker, stopped smoking in distant past  Rheumatoid arthritis  Seizure disorder: 1 episode approximately 15 yrs ago  Asthma  Hyperlipidemia  BPH (benign prostatic hyperplasia)  HTN (hypertension)  Esophagus cancer  Chronic allergic rhinitis  BCC (basal cell carcinoma): skin  Cerebrovascular accident (CVA)  Anxiety  Anastomotic leak following esophagectomy: [place] logan esophagectomy  History of tonsillectomy  H/O heart artery stent: June 30, 2017  Knee arthropathy: left  History of total hip replacement: left  History of hernia repair  admitted with complaints of Patient is a 82y old  Male who presents with a chief complaint of feeding tube clogged s/p maryjane logan (21 Sep 2017 04:19)  .  On (Date), patient underwent Thoracostomy, tube  EGD (esophagogastroduodenoscopy)  . Postoperative course/issues:    PLAN  PO meds with sip of H20  Ace wrap b/l LExt and keep legs elevated  Neuro: Pain management  Pulm: Encourage coughing, deep breathing and use of incentive spirometry.   Daily CXR.   Cardio: Monitor telemetry/alarms  GI: 18 hrs tube feedings as tolerated .  Renal: monitor urine output, supplement electrolytes as needed  Vasc: Heparin SC/SCDs for DVT prophylaxis  Heme: Stable H/H. .   ID: Off antibiotics. Stable.  Therapy: OOB/ambulate  Tubes: Monitor Chest tube output  Disposition: Aim to D/C to home on 10/4/17  Discussed with Cardiothoracic Team at AM rounds.

## 2017-10-03 NOTE — PROGRESS NOTE ADULT - SUBJECTIVE AND OBJECTIVE BOX
Surgery Progress Note    S: Patient seen and examined. No acute events overnight. Pain well controlled with current regimen. Denies nausea/vomiting. Patient endorses having a bowel movement today, denies flatus. J-tube clog was cleared overnight.    O:  Vital Signs Last 24 Hrs  T(C): 36.6 (03 Oct 2017 16:15), Max: 36.9 (03 Oct 2017 07:59)  T(F): 97.8 (03 Oct 2017 16:15), Max: 98.5 (03 Oct 2017 07:59)  HR: 90 (03 Oct 2017 16:15) (81 - 90)  BP: 139/68 (03 Oct 2017 16:15) (111/63 - 139/68)  BP(mean): --  RR: 18 (03 Oct 2017 16:15) (17 - 18)  SpO2: 100% (03 Oct 2017 16:15) (95% - 100%)    I&O's Detail    02 Oct 2017 07:01  -  03 Oct 2017 07:00  --------------------------------------------------------  IN:    Enteral Tube Flush: 270 mL    Jevity: 540 mL    sodium chloride 0.9%: 10 mL  Total IN: 820 mL    OUT:    Voided: 1980 mL  Total OUT: 1980 mL    Total NET: -1160 mL      03 Oct 2017 07:01  -  03 Oct 2017 19:40  --------------------------------------------------------  IN:    Enteral Tube Flush: 500 mL    Jevity: 670 mL  Total IN: 1170 mL    OUT:    Drain: 5 mL    Voided: 690 mL  Total OUT: 695 mL    Total NET: 475 mL          MEDICATIONS  (STANDING):  ALPRAZolam 0.5 milliGRAM(s) Oral two times a day  amLODIPine   Tablet 5 milliGRAM(s) Oral daily  cyanocobalamin 500 MICROGram(s) Oral daily  dipyridamole 200 mG/aspirin 25 mG 1 Capsule(s) Oral two times a day  docusate sodium 100 milliGRAM(s) Oral two times a day  finasteride 5 milliGRAM(s) Oral daily  fluticasone propionate   220 MICROgram(s) HFA Inhaler 1 Puff(s) Inhalation two times a day  folic acid 1 milliGRAM(s) Oral daily  lidocaine 1% Injectable 20 milliLiter(s) Local Injection once  losartan 50 milliGRAM(s) Oral daily  montelukast 10 milliGRAM(s) Oral daily  nystatin Powder 1 Application(s) Topical two times a day  phenytoin   Capsule 200 milliGRAM(s) Oral two times a day  predniSONE   Tablet 5 milliGRAM(s) Oral daily  pyridoxine 100 milliGRAM(s) Oral daily  simvastatin 40 milliGRAM(s) Oral at bedtime  tamsulosin 0.4 milliGRAM(s) Oral at bedtime    MEDICATIONS  (PRN):  ALBUTerol/ipratropium for Nebulization 3 milliLiter(s) Nebulizer every 6 hours PRN Shortness of Breath and/or Wheezing  guaiFENesin    Syrup 200 milliGRAM(s) Oral every 6 hours PRN Cough  melatonin 3 milliGRAM(s) Oral at bedtime PRN Insomnia  sodium chloride 3%  Inhalation 4 milliLiter(s) Inhalation every 6 hours PRN respiratory congestion                            9.7    8.22  )-----------( 205      ( 03 Oct 2017 06:08 )             30.5       10-03    140  |  102  |  14  ----------------------------<  101<H>  4.3   |  29  |  0.47<L>    Ca    8.7      03 Oct 2017 06:08        Physical Exam:  Gen: Laying in bed, NAD, alert and oriented.   Resp: Unlabored breathing  Abd: soft, NTND  Extremities: Mild-moderate b/l PETER

## 2017-10-03 NOTE — PROGRESS NOTE ADULT - PROBLEM SELECTOR PLAN 4
s/p egd with esophageal leak x 2  management as per gi/thoracic
s/p edg with esophageal leak x 2  management as per gi/thoracic
s/p egd with esophageal leak x 2  management as per gi/thoracic

## 2017-10-03 NOTE — PROGRESS NOTE ADULT - SUBJECTIVE AND OBJECTIVE BOX
Follow-up Pulm Progress Note    No new respiratory events overnight.  Denies SOB/CP.     Medications:  MEDICATIONS  (STANDING):  ALBUTerol/ipratropium for Nebulization 3 milliLiter(s) Nebulizer every 6 hours  ALPRAZolam 0.5 milliGRAM(s) Oral two times a day  amLODIPine   Tablet 5 milliGRAM(s) Oral daily  cyanocobalamin 500 MICROGram(s) Oral daily  dipyridamole 200 mG/aspirin 25 mG 1 Capsule(s) Oral two times a day  docusate sodium 100 milliGRAM(s) Oral two times a day  dornase ziyad Solution 2.5 milliGRAM(s) Inhalation two times a day  finasteride 5 milliGRAM(s) Oral daily  fluticasone propionate   220 MICROgram(s) HFA Inhaler 1 Puff(s) Inhalation two times a day  folic acid 1 milliGRAM(s) Oral daily  lidocaine 1% Injectable 20 milliLiter(s) Local Injection once  losartan 50 milliGRAM(s) Oral daily  montelukast 10 milliGRAM(s) Oral daily  nystatin Powder 1 Application(s) Topical two times a day  phenytoin   Capsule 200 milliGRAM(s) Oral two times a day  predniSONE   Tablet 5 milliGRAM(s) Oral daily  pyridoxine 100 milliGRAM(s) Oral daily  simvastatin 40 milliGRAM(s) Oral at bedtime  tamsulosin 0.4 milliGRAM(s) Oral at bedtime    MEDICATIONS  (PRN):  guaiFENesin    Syrup 200 milliGRAM(s) Oral every 6 hours PRN Cough  melatonin 3 milliGRAM(s) Oral at bedtime PRN Insomnia  sodium chloride 3%  Inhalation 4 milliLiter(s) Inhalation every 6 hours PRN respiratory congestion    Vital Signs Last 24 Hrs  T(C): 36.9 (03 Oct 2017 07:59), Max: 36.9 (03 Oct 2017 07:59)  T(F): 98.5 (03 Oct 2017 07:59), Max: 98.5 (03 Oct 2017 07:59)  HR: 81 (03 Oct 2017 10:24) (81 - 89)  BP: 128/70 (03 Oct 2017 07:59) (128/64 - 134/66)  BP(mean): --  RR: 18 (03 Oct 2017 07:59) (16 - 20)  SpO2: 100% (03 Oct 2017 07:59) (95% - 100%)    10-02 @ 07:01  -  10-03 @ 07:00  --------------------------------------------------------  IN: 820 mL / OUT: 1980 mL / NET: -1160 mL    LABS:                        9.7    8.22  )-----------( 205      ( 03 Oct 2017 06:08 )             30.5     10-03    140  |  102  |  14  ----------------------------<  101<H>  4.3   |  29  |  0.47<L>    Ca    8.7      03 Oct 2017 06:08    CAPILLARY BLOOD GLUCOSE    CULTURES: (if applicable)          Physical Examination:  PULM: Decreased BS at rt base  CVS: S1, S2 heard    RADIOLOGY REVIEWED  CXR:     CT chest:    TTE:

## 2017-10-03 NOTE — PROGRESS NOTE ADULT - PROBLEM SELECTOR PLAN 3
duonebs   on steroids solumedrol 20 iv q12 (has hx of RA, was on chronic pred)  can halve to 20 daily and taper to home dosing subsuquently
duonebs   on steroids solumedrol 20 iv q12 (has hx of RA, was on chronic pred)
duonebs   on home dose steroids
switch to PRN duoneb, d/c dornase  on home dose steroids
duonebs   on steroids solumedrol 20 iv q12 (has hx of RA, was on chronic pred)

## 2017-10-03 NOTE — PROGRESS NOTE ADULT - PROBLEM SELECTOR PLAN 5
medically managed  off heparin

## 2017-10-04 VITALS
SYSTOLIC BLOOD PRESSURE: 123 MMHG | DIASTOLIC BLOOD PRESSURE: 62 MMHG | HEART RATE: 81 BPM | RESPIRATION RATE: 18 BRPM | OXYGEN SATURATION: 100 % | TEMPERATURE: 97 F

## 2017-10-04 LAB — C DIFF TOX GENS STL QL NAA+PROBE: SIGNIFICANT CHANGE UP

## 2017-10-04 PROCEDURE — 71010: CPT | Mod: 26

## 2017-10-04 PROCEDURE — 99238 HOSP IP/OBS DSCHRG MGMT 30/<: CPT

## 2017-10-04 RX ORDER — LOPERAMIDE HCL 2 MG
2 TABLET ORAL EVERY 8 HOURS
Qty: 0 | Refills: 0 | Status: DISCONTINUED | OUTPATIENT
Start: 2017-10-04 | End: 2017-10-04

## 2017-10-04 RX ORDER — LIDOCAINE HCL 20 MG/ML
20 VIAL (ML) INJECTION ONCE
Qty: 0 | Refills: 0 | Status: DISCONTINUED | OUTPATIENT
Start: 2017-10-04 | End: 2017-10-04

## 2017-10-04 RX ADMIN — Medication 1 MILLIGRAM(S): at 11:50

## 2017-10-04 RX ADMIN — Medication 0.5 MILLIGRAM(S): at 08:55

## 2017-10-04 RX ADMIN — MONTELUKAST 10 MILLIGRAM(S): 4 TABLET, CHEWABLE ORAL at 11:50

## 2017-10-04 RX ADMIN — Medication 2 MILLIGRAM(S): at 09:52

## 2017-10-04 RX ADMIN — Medication 5 MILLIGRAM(S): at 08:55

## 2017-10-04 RX ADMIN — FINASTERIDE 5 MILLIGRAM(S): 5 TABLET, FILM COATED ORAL at 11:50

## 2017-10-04 RX ADMIN — Medication 200 MILLIGRAM(S): at 08:55

## 2017-10-04 RX ADMIN — AMLODIPINE BESYLATE 5 MILLIGRAM(S): 2.5 TABLET ORAL at 08:55

## 2017-10-04 RX ADMIN — PREGABALIN 500 MICROGRAM(S): 225 CAPSULE ORAL at 11:50

## 2017-10-04 RX ADMIN — LOSARTAN POTASSIUM 50 MILLIGRAM(S): 100 TABLET, FILM COATED ORAL at 08:55

## 2017-10-04 RX ADMIN — ASPIRIN AND DIPYRIDAMOLE 1 CAPSULE(S): 25; 200 CAPSULE, EXTENDED RELEASE ORAL at 08:55

## 2017-10-04 RX ADMIN — Medication 100 MILLIGRAM(S): at 11:51

## 2017-10-04 RX ADMIN — NYSTATIN CREAM 1 APPLICATION(S): 100000 CREAM TOPICAL at 08:56

## 2017-10-04 NOTE — PROGRESS NOTE ADULT - SUBJECTIVE AND OBJECTIVE BOX
MEJIA LATIF:[id number],   82yMale followed for: esophageal  cancer, leak  penicillin (Rash)    PAST MEDICAL & SURGICAL HISTORY:  Coronary artery disease: s/p 3 stents on June 30, 2017 at [place] Dr. Lavelle Reid  OA (osteoarthritis) of knee: right  Former smoker, stopped smoking in distant past  Rheumatoid arthritis  Seizure disorder: 1 episode approximately 15 yrs ago  Asthma  Hyperlipidemia  BPH (benign prostatic hyperplasia)  HTN (hypertension)  Esophagus cancer  Chronic allergic rhinitis  BCC (basal cell carcinoma): skin  Cerebrovascular accident (CVA)  Anxiety  Anastomotic leak following esophagectomy: [place] logan esophagectomy  History of tonsillectomy  H/O heart artery stent: June 30, 2017  Knee arthropathy: left  History of total hip replacement: left  History of hernia repair    FAMILY HISTORY:  Family history of heart attack (Sibling)  Family history of pulmonary embolism: father @ 49 yr old  FH: CAD (coronary artery disease)    MEDICATIONS  (STANDING):  ALPRAZolam 0.5 milliGRAM(s) Oral two times a day  amLODIPine   Tablet 5 milliGRAM(s) Oral daily  cyanocobalamin 500 MICROGram(s) Oral daily  dipyridamole 200 mG/aspirin 25 mG 1 Capsule(s) Oral two times a day  docusate sodium 100 milliGRAM(s) Oral two times a day  finasteride 5 milliGRAM(s) Oral daily  fluticasone propionate   220 MICROgram(s) HFA Inhaler 1 Puff(s) Inhalation two times a day  folic acid 1 milliGRAM(s) Oral daily  lidocaine 1% Injectable 20 milliLiter(s) Local Injection once  loperamide Solution 2 milliGRAM(s) Oral every 8 hours  losartan 50 milliGRAM(s) Oral daily  montelukast 10 milliGRAM(s) Oral daily  nystatin Powder 1 Application(s) Topical two times a day  phenytoin   Capsule 200 milliGRAM(s) Oral two times a day  predniSONE   Tablet 5 milliGRAM(s) Oral daily  pyridoxine 100 milliGRAM(s) Oral daily  simvastatin 40 milliGRAM(s) Oral at bedtime  tamsulosin 0.4 milliGRAM(s) Oral at bedtime    MEDICATIONS  (PRN):  ALBUTerol/ipratropium for Nebulization 3 milliLiter(s) Nebulizer every 6 hours PRN Shortness of Breath and/or Wheezing  guaiFENesin    Syrup 200 milliGRAM(s) Oral every 6 hours PRN Cough  melatonin 3 milliGRAM(s) Oral at bedtime PRN Insomnia  sodium chloride 3%  Inhalation 4 milliLiter(s) Inhalation every 6 hours PRN respiratory congestion      Vital Signs Last 24 Hrs  T(C): 36.7 (04 Oct 2017 07:56), Max: 36.7 (03 Oct 2017 19:57)  T(F): 98 (04 Oct 2017 07:56), Max: 98 (03 Oct 2017 19:57)  HR: 86 (04 Oct 2017 07:56) (81 - 97)  BP: 140/75 (04 Oct 2017 07:56) (111/63 - 144/70)  BP(mean): --  RR: 18 (04 Oct 2017 07:56) (17 - 18)  SpO2: 99% (04 Oct 2017 07:56) (95% - 100%)  nc/at  s1s2  cta  soft, nt, nd no guarding or rebound  no c/c/e    CBC Full  -  ( 03 Oct 2017 06:08 )  WBC Count : 8.22 K/uL  Hemoglobin : 9.7 g/dL  Hematocrit : 30.5 %  Platelet Count - Automated : 205 K/uL  Mean Cell Volume : 93.8 fL  Mean Cell Hemoglobin : 29.8 pg  Mean Cell Hemoglobin Concentration : 31.8 %  Auto Neutrophil # : x  Auto Lymphocyte # : x  Auto Monocyte # : x  Auto Eosinophil # : x  Auto Basophil # : x  Auto Neutrophil % : x  Auto Lymphocyte % : x  Auto Monocyte % : x  Auto Eosinophil % : x  Auto Basophil % : x    10-03    140  |  102  |  14  ----------------------------<  101<H>  4.3   |  29  |  0.47<L>    Ca    8.7      03 Oct 2017 06:08

## 2017-10-04 NOTE — PROGRESS NOTE ADULT - PROVIDER SPECIALTY LIST ADULT
CT Surgery
Cardiology
Critical Care
Gastroenterology
Infectious Disease
Pulmonology
Surgery
Thoracic Surgery
Thoracic Surgery
Surgery
Gastroenterology
Infectious Disease
Surgery
Surgery
Pulmonology
Pulmonology

## 2017-10-06 ENCOUNTER — OUTPATIENT (OUTPATIENT)
Dept: OUTPATIENT SERVICES | Facility: HOSPITAL | Age: 82
LOS: 1 days | End: 2017-10-06
Payer: MEDICARE

## 2017-10-06 ENCOUNTER — APPOINTMENT (OUTPATIENT)
Dept: RADIOLOGY | Facility: HOSPITAL | Age: 82
End: 2017-10-06
Payer: MEDICARE

## 2017-10-06 DIAGNOSIS — M12.9 ARTHROPATHY, UNSPECIFIED: Chronic | ICD-10-CM

## 2017-10-06 DIAGNOSIS — Z98.890 OTHER SPECIFIED POSTPROCEDURAL STATES: Chronic | ICD-10-CM

## 2017-10-06 DIAGNOSIS — Z95.5 PRESENCE OF CORONARY ANGIOPLASTY IMPLANT AND GRAFT: Chronic | ICD-10-CM

## 2017-10-06 DIAGNOSIS — K91.89 OTHER POSTPROCEDURAL COMPLICATIONS AND DISORDERS OF DIGESTIVE SYSTEM: Chronic | ICD-10-CM

## 2017-10-06 DIAGNOSIS — Z90.89 ACQUIRED ABSENCE OF OTHER ORGANS: Chronic | ICD-10-CM

## 2017-10-06 DIAGNOSIS — Z96.649 PRESENCE OF UNSPECIFIED ARTIFICIAL HIP JOINT: Chronic | ICD-10-CM

## 2017-10-06 PROCEDURE — 71046 X-RAY EXAM CHEST 2 VIEWS: CPT

## 2017-10-06 PROCEDURE — 71020: CPT | Mod: 26

## 2017-10-10 ENCOUNTER — APPOINTMENT (OUTPATIENT)
Dept: THORACIC SURGERY | Facility: CLINIC | Age: 82
End: 2017-10-10
Payer: MEDICARE

## 2017-10-10 VITALS
RESPIRATION RATE: 18 BRPM | BODY MASS INDEX: 22.63 KG/M2 | HEART RATE: 88 BPM | WEIGHT: 136 LBS | DIASTOLIC BLOOD PRESSURE: 69 MMHG | SYSTOLIC BLOOD PRESSURE: 120 MMHG | OXYGEN SATURATION: 96 %

## 2017-10-10 PROCEDURE — 99024 POSTOP FOLLOW-UP VISIT: CPT

## 2017-10-10 RX ORDER — TRAZODONE HYDROCHLORIDE 50 MG/1
50 TABLET ORAL
Qty: 30 | Refills: 0 | Status: COMPLETED | COMMUNITY
Start: 2017-04-11 | End: 2017-10-10

## 2017-10-10 RX ORDER — PHENYTOIN 50 MG/1
50 TABLET, CHEWABLE ORAL
Qty: 90 | Refills: 0 | Status: COMPLETED | COMMUNITY
Start: 2016-12-27 | End: 2017-10-10

## 2017-10-11 ENCOUNTER — MESSAGE (OUTPATIENT)
Age: 82
End: 2017-10-11

## 2017-10-12 ENCOUNTER — FORM ENCOUNTER (OUTPATIENT)
Age: 82
End: 2017-10-12

## 2017-10-13 ENCOUNTER — OUTPATIENT (OUTPATIENT)
Dept: OUTPATIENT SERVICES | Facility: HOSPITAL | Age: 82
LOS: 1 days | End: 2017-10-13
Payer: MEDICARE

## 2017-10-13 ENCOUNTER — APPOINTMENT (OUTPATIENT)
Dept: PULMONOLOGY | Facility: CLINIC | Age: 82
End: 2017-10-13
Payer: MEDICARE

## 2017-10-13 ENCOUNTER — APPOINTMENT (OUTPATIENT)
Dept: CT IMAGING | Facility: HOSPITAL | Age: 82
End: 2017-10-13
Payer: MEDICARE

## 2017-10-13 VITALS
HEIGHT: 63 IN | WEIGHT: 135 LBS | SYSTOLIC BLOOD PRESSURE: 100 MMHG | DIASTOLIC BLOOD PRESSURE: 60 MMHG | HEART RATE: 80 BPM | RESPIRATION RATE: 15 BRPM | BODY MASS INDEX: 23.92 KG/M2 | OXYGEN SATURATION: 100 %

## 2017-10-13 DIAGNOSIS — Z98.890 OTHER SPECIFIED POSTPROCEDURAL STATES: Chronic | ICD-10-CM

## 2017-10-13 DIAGNOSIS — Z87.891 PERSONAL HISTORY OF NICOTINE DEPENDENCE: ICD-10-CM

## 2017-10-13 DIAGNOSIS — Z87.898 PERSONAL HISTORY OF OTHER SPECIFIED CONDITIONS: ICD-10-CM

## 2017-10-13 DIAGNOSIS — Z90.89 ACQUIRED ABSENCE OF OTHER ORGANS: Chronic | ICD-10-CM

## 2017-10-13 DIAGNOSIS — K91.89 OTHER POSTPROCEDURAL COMPLICATIONS AND DISORDERS OF DIGESTIVE SYSTEM: Chronic | ICD-10-CM

## 2017-10-13 DIAGNOSIS — Z86.73 PERSONAL HISTORY OF TRANSIENT ISCHEMIC ATTACK (TIA), AND CEREBRAL INFARCTION W/OUT RESIDUAL DEFICITS: ICD-10-CM

## 2017-10-13 DIAGNOSIS — Z95.5 PRESENCE OF CORONARY ANGIOPLASTY IMPLANT AND GRAFT: Chronic | ICD-10-CM

## 2017-10-13 DIAGNOSIS — Z23 ENCOUNTER FOR IMMUNIZATION: ICD-10-CM

## 2017-10-13 DIAGNOSIS — Z80.3 FAMILY HISTORY OF MALIGNANT NEOPLASM OF BREAST: ICD-10-CM

## 2017-10-13 DIAGNOSIS — Z87.19 PERSONAL HISTORY OF OTHER DISEASES OF THE DIGESTIVE SYSTEM: ICD-10-CM

## 2017-10-13 DIAGNOSIS — Z96.649 PRESENCE OF UNSPECIFIED ARTIFICIAL HIP JOINT: Chronic | ICD-10-CM

## 2017-10-13 DIAGNOSIS — M12.9 ARTHROPATHY, UNSPECIFIED: Chronic | ICD-10-CM

## 2017-10-13 PROCEDURE — 71260 CT THORAX DX C+: CPT | Mod: 26

## 2017-10-13 PROCEDURE — 71260 CT THORAX DX C+: CPT

## 2017-10-13 PROCEDURE — 71020: CPT

## 2017-10-13 PROCEDURE — 99070 SPECIAL SUPPLIES PHYS/QHP: CPT

## 2017-10-13 PROCEDURE — 94620 PULMONARY STRESS TESTING SIMPLE: CPT

## 2017-10-13 PROCEDURE — 90662 IIV NO PRSV INCREASED AG IM: CPT

## 2017-10-13 PROCEDURE — 94060 EVALUATION OF WHEEZING: CPT | Mod: 59

## 2017-10-13 PROCEDURE — G0008: CPT

## 2017-10-13 PROCEDURE — 99205 OFFICE O/P NEW HI 60 MIN: CPT | Mod: 25

## 2017-10-13 PROCEDURE — 94729 DIFFUSING CAPACITY: CPT

## 2017-10-15 ENCOUNTER — FORM ENCOUNTER (OUTPATIENT)
Age: 82
End: 2017-10-15

## 2017-10-16 ENCOUNTER — OUTPATIENT (OUTPATIENT)
Dept: OUTPATIENT SERVICES | Facility: HOSPITAL | Age: 82
LOS: 1 days | End: 2017-10-16

## 2017-10-16 ENCOUNTER — APPOINTMENT (OUTPATIENT)
Dept: RADIOLOGY | Facility: HOSPITAL | Age: 82
End: 2017-10-16
Payer: MEDICARE

## 2017-10-16 ENCOUNTER — MEDICATION RENEWAL (OUTPATIENT)
Age: 82
End: 2017-10-16

## 2017-10-16 DIAGNOSIS — C15.5 MALIGNANT NEOPLASM OF LOWER THIRD OF ESOPHAGUS: ICD-10-CM

## 2017-10-16 DIAGNOSIS — Z90.89 ACQUIRED ABSENCE OF OTHER ORGANS: Chronic | ICD-10-CM

## 2017-10-16 DIAGNOSIS — Z98.890 OTHER SPECIFIED POSTPROCEDURAL STATES: Chronic | ICD-10-CM

## 2017-10-16 DIAGNOSIS — M12.9 ARTHROPATHY, UNSPECIFIED: Chronic | ICD-10-CM

## 2017-10-16 DIAGNOSIS — K91.89 OTHER POSTPROCEDURAL COMPLICATIONS AND DISORDERS OF DIGESTIVE SYSTEM: Chronic | ICD-10-CM

## 2017-10-16 DIAGNOSIS — Z95.5 PRESENCE OF CORONARY ANGIOPLASTY IMPLANT AND GRAFT: Chronic | ICD-10-CM

## 2017-10-16 DIAGNOSIS — Z96.649 PRESENCE OF UNSPECIFIED ARTIFICIAL HIP JOINT: Chronic | ICD-10-CM

## 2017-10-16 PROCEDURE — 74220 X-RAY XM ESOPHAGUS 1CNTRST: CPT | Mod: 26

## 2017-10-17 ENCOUNTER — MEDICATION RENEWAL (OUTPATIENT)
Age: 82
End: 2017-10-17

## 2017-10-19 PROCEDURE — 88342 IMHCHEM/IMCYTCHM 1ST ANTB: CPT

## 2017-10-19 PROCEDURE — 93005 ELECTROCARDIOGRAM TRACING: CPT

## 2017-10-19 PROCEDURE — C1769: CPT

## 2017-10-19 PROCEDURE — 86900 BLOOD TYPING SEROLOGIC ABO: CPT

## 2017-10-19 PROCEDURE — 71045 X-RAY EXAM CHEST 1 VIEW: CPT

## 2017-10-19 PROCEDURE — 88341 IMHCHEM/IMCYTCHM EA ADD ANTB: CPT

## 2017-10-19 PROCEDURE — 93971 EXTREMITY STUDY: CPT

## 2017-10-19 PROCEDURE — 99152 MOD SED SAME PHYS/QHP 5/>YRS: CPT

## 2017-10-19 PROCEDURE — 71275 CT ANGIOGRAPHY CHEST: CPT

## 2017-10-19 PROCEDURE — 80185 ASSAY OF PHENYTOIN TOTAL: CPT

## 2017-10-19 PROCEDURE — 99285 EMERGENCY DEPT VISIT HI MDM: CPT | Mod: 25

## 2017-10-19 PROCEDURE — C1887: CPT

## 2017-10-19 PROCEDURE — 80048 BASIC METABOLIC PNL TOTAL CA: CPT

## 2017-10-19 PROCEDURE — 86850 RBC ANTIBODY SCREEN: CPT

## 2017-10-19 PROCEDURE — 80053 COMPREHEN METABOLIC PANEL: CPT

## 2017-10-19 PROCEDURE — 85014 HEMATOCRIT: CPT

## 2017-10-19 PROCEDURE — 82435 ASSAY OF BLOOD CHLORIDE: CPT

## 2017-10-19 PROCEDURE — 87581 M.PNEUMON DNA AMP PROBE: CPT

## 2017-10-19 PROCEDURE — 82553 CREATINE MB FRACTION: CPT

## 2017-10-19 PROCEDURE — 84295 ASSAY OF SERUM SODIUM: CPT

## 2017-10-19 PROCEDURE — P9045: CPT

## 2017-10-19 PROCEDURE — 84145 PROCALCITONIN (PCT): CPT

## 2017-10-19 PROCEDURE — 71046 X-RAY EXAM CHEST 2 VIEWS: CPT

## 2017-10-19 PROCEDURE — 85610 PROTHROMBIN TIME: CPT

## 2017-10-19 PROCEDURE — 82550 ASSAY OF CK (CPK): CPT

## 2017-10-19 PROCEDURE — 82947 ASSAY GLUCOSE BLOOD QUANT: CPT

## 2017-10-19 PROCEDURE — 82945 GLUCOSE OTHER FLUID: CPT

## 2017-10-19 PROCEDURE — 36430 TRANSFUSION BLD/BLD COMPNT: CPT

## 2017-10-19 PROCEDURE — 81001 URINALYSIS AUTO W/SCOPE: CPT

## 2017-10-19 PROCEDURE — 82803 BLOOD GASES ANY COMBINATION: CPT

## 2017-10-19 PROCEDURE — C1894: CPT

## 2017-10-19 PROCEDURE — 84484 ASSAY OF TROPONIN QUANT: CPT

## 2017-10-19 PROCEDURE — 82042 OTHER SOURCE ALBUMIN QUAN EA: CPT

## 2017-10-19 PROCEDURE — 83605 ASSAY OF LACTIC ACID: CPT

## 2017-10-19 PROCEDURE — 83986 ASSAY PH BODY FLUID NOS: CPT

## 2017-10-19 PROCEDURE — 88112 CYTOPATH CELL ENHANCE TECH: CPT

## 2017-10-19 PROCEDURE — 84132 ASSAY OF SERUM POTASSIUM: CPT

## 2017-10-19 PROCEDURE — 84157 ASSAY OF PROTEIN OTHER: CPT

## 2017-10-19 PROCEDURE — 87798 DETECT AGENT NOS DNA AMP: CPT

## 2017-10-19 PROCEDURE — 93308 TTE F-UP OR LMTD: CPT

## 2017-10-19 PROCEDURE — 80202 ASSAY OF VANCOMYCIN: CPT

## 2017-10-19 PROCEDURE — 87486 CHLMYD PNEUM DNA AMP PROBE: CPT

## 2017-10-19 PROCEDURE — 99153 MOD SED SAME PHYS/QHP EA: CPT

## 2017-10-19 PROCEDURE — 82330 ASSAY OF CALCIUM: CPT

## 2017-10-19 PROCEDURE — 88305 TISSUE EXAM BY PATHOLOGIST: CPT

## 2017-10-19 PROCEDURE — 87449 NOS EACH ORGANISM AG IA: CPT

## 2017-10-19 PROCEDURE — 85730 THROMBOPLASTIN TIME PARTIAL: CPT

## 2017-10-19 PROCEDURE — 86901 BLOOD TYPING SEROLOGIC RH(D): CPT

## 2017-10-19 PROCEDURE — 87205 SMEAR GRAM STAIN: CPT

## 2017-10-19 PROCEDURE — 87633 RESP VIRUS 12-25 TARGETS: CPT

## 2017-10-19 PROCEDURE — 87150 DNA/RNA AMPLIFIED PROBE: CPT

## 2017-10-19 PROCEDURE — 74220 X-RAY XM ESOPHAGUS 1CNTRST: CPT

## 2017-10-19 PROCEDURE — 83735 ASSAY OF MAGNESIUM: CPT

## 2017-10-19 PROCEDURE — 93321 DOPPLER ECHO F-UP/LMTD STD: CPT

## 2017-10-19 PROCEDURE — 83880 ASSAY OF NATRIURETIC PEPTIDE: CPT

## 2017-10-19 PROCEDURE — 84478 ASSAY OF TRIGLYCERIDES: CPT

## 2017-10-19 PROCEDURE — 87040 BLOOD CULTURE FOR BACTERIA: CPT

## 2017-10-19 PROCEDURE — 89051 BODY FLUID CELL COUNT: CPT

## 2017-10-19 PROCEDURE — 85027 COMPLETE CBC AUTOMATED: CPT

## 2017-10-19 PROCEDURE — 87493 C DIFF AMPLIFIED PROBE: CPT

## 2017-10-19 PROCEDURE — 92920 PRQ TRLUML C ANGIOP 1ART&/BR: CPT | Mod: LD

## 2017-10-19 PROCEDURE — 82565 ASSAY OF CREATININE: CPT

## 2017-10-19 PROCEDURE — 87075 CULTR BACTERIA EXCEPT BLOOD: CPT

## 2017-10-19 PROCEDURE — P9016: CPT

## 2017-10-19 PROCEDURE — 82150 ASSAY OF AMYLASE: CPT

## 2017-10-19 PROCEDURE — 94640 AIRWAY INHALATION TREATMENT: CPT

## 2017-10-19 PROCEDURE — 83615 LACTATE (LD) (LDH) ENZYME: CPT

## 2017-10-19 PROCEDURE — 87070 CULTURE OTHR SPECIMN AEROBIC: CPT

## 2017-10-19 PROCEDURE — 80076 HEPATIC FUNCTION PANEL: CPT

## 2017-10-19 PROCEDURE — 93454 CORONARY ARTERY ANGIO S&I: CPT | Mod: 59

## 2017-10-19 PROCEDURE — 84100 ASSAY OF PHOSPHORUS: CPT

## 2017-10-19 PROCEDURE — 87324 CLOSTRIDIUM AG IA: CPT

## 2017-10-19 PROCEDURE — 86923 COMPATIBILITY TEST ELECTRIC: CPT

## 2017-10-19 PROCEDURE — 94002 VENT MGMT INPAT INIT DAY: CPT

## 2017-10-19 PROCEDURE — C1725: CPT

## 2017-10-19 PROCEDURE — 93306 TTE W/DOPPLER COMPLETE: CPT

## 2017-10-20 LAB — FUNGUS SPEC QL CULT: SIGNIFICANT CHANGE UP

## 2017-10-24 ENCOUNTER — APPOINTMENT (OUTPATIENT)
Dept: THORACIC SURGERY | Facility: CLINIC | Age: 82
End: 2017-10-24
Payer: MEDICARE

## 2017-10-24 VITALS
HEART RATE: 83 BPM | RESPIRATION RATE: 16 BRPM | DIASTOLIC BLOOD PRESSURE: 67 MMHG | WEIGHT: 131 LBS | BODY MASS INDEX: 23.21 KG/M2 | SYSTOLIC BLOOD PRESSURE: 116 MMHG | OXYGEN SATURATION: 95 %

## 2017-10-24 PROCEDURE — 99024 POSTOP FOLLOW-UP VISIT: CPT

## 2017-10-25 RX ORDER — ACLIDINIUM BROMIDE 400 UG/1
400 POWDER, METERED RESPIRATORY (INHALATION) TWICE DAILY
Qty: 3 | Refills: 2 | Status: DISCONTINUED | COMMUNITY
Start: 2017-10-13 | End: 2017-10-25

## 2017-10-31 DIAGNOSIS — R53.81 OTHER MALAISE: ICD-10-CM

## 2017-11-01 ENCOUNTER — APPOINTMENT (OUTPATIENT)
Dept: PULMONOLOGY | Facility: CLINIC | Age: 82
End: 2017-11-01
Payer: MEDICARE

## 2017-11-01 PROCEDURE — 94620 PULMONARY STRESS TESTING SIMPLE: CPT

## 2017-11-02 PROCEDURE — 93010 ELECTROCARDIOGRAM REPORT: CPT

## 2017-11-02 PROCEDURE — 71010: CPT | Mod: 26

## 2017-11-03 ENCOUNTER — INPATIENT (INPATIENT)
Facility: HOSPITAL | Age: 82
LOS: 0 days | Discharge: HOME | DRG: 313 | End: 2017-11-03
Attending: INTERNAL MEDICINE | Admitting: INTERNAL MEDICINE
Payer: COMMERCIAL
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
## 2017-11-03 DIAGNOSIS — N40.0 BENIGN PROSTATIC HYPERPLASIA WITHOUT LOWER URINARY TRACT SYMPTOMS: ICD-10-CM

## 2017-11-03 DIAGNOSIS — Z87.891 PERSONAL HISTORY OF NICOTINE DEPENDENCE: ICD-10-CM

## 2017-11-03 DIAGNOSIS — Z95.5 PRESENCE OF CORONARY ANGIOPLASTY IMPLANT AND GRAFT: ICD-10-CM

## 2017-11-03 DIAGNOSIS — Z96.649 PRESENCE OF UNSPECIFIED ARTIFICIAL HIP JOINT: Chronic | ICD-10-CM

## 2017-11-03 DIAGNOSIS — Z96.642 PRESENCE OF LEFT ARTIFICIAL HIP JOINT: ICD-10-CM

## 2017-11-03 DIAGNOSIS — Z88.0 ALLERGY STATUS TO PENICILLIN: ICD-10-CM

## 2017-11-03 DIAGNOSIS — J45.909 UNSPECIFIED ASTHMA, UNCOMPLICATED: ICD-10-CM

## 2017-11-03 DIAGNOSIS — Z95.5 PRESENCE OF CORONARY ANGIOPLASTY IMPLANT AND GRAFT: Chronic | ICD-10-CM

## 2017-11-03 DIAGNOSIS — M06.9 RHEUMATOID ARTHRITIS, UNSPECIFIED: ICD-10-CM

## 2017-11-03 DIAGNOSIS — G40.909 EPILEPSY, UNSPECIFIED, NOT INTRACTABLE, WITHOUT STATUS EPILEPTICUS: ICD-10-CM

## 2017-11-03 DIAGNOSIS — G89.18 OTHER ACUTE POSTPROCEDURAL PAIN: ICD-10-CM
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
## 2017-11-03 DIAGNOSIS — Z99.81 DEPENDENCE ON SUPPLEMENTAL OXYGEN: ICD-10-CM

## 2017-11-03 DIAGNOSIS — I10 ESSENTIAL (PRIMARY) HYPERTENSION: ICD-10-CM

## 2017-11-03 DIAGNOSIS — M12.9 ARTHROPATHY, UNSPECIFIED: Chronic | ICD-10-CM

## 2017-11-03 DIAGNOSIS — Z98.890 OTHER SPECIFIED POSTPROCEDURAL STATES: Chronic | ICD-10-CM

## 2017-11-03 DIAGNOSIS — Z86.73 PERSONAL HISTORY OF TRANSIENT ISCHEMIC ATTACK (TIA), AND CEREBRAL INFARCTION WITHOUT RESIDUAL DEFICITS: ICD-10-CM

## 2017-11-03 DIAGNOSIS — Z90.89 ACQUIRED ABSENCE OF OTHER ORGANS: Chronic | ICD-10-CM

## 2017-11-03 DIAGNOSIS — R07.9 CHEST PAIN, UNSPECIFIED: ICD-10-CM

## 2017-11-03 DIAGNOSIS — E78.5 HYPERLIPIDEMIA, UNSPECIFIED: ICD-10-CM

## 2017-11-03 DIAGNOSIS — I25.10 ATHEROSCLEROTIC HEART DISEASE OF NATIVE CORONARY ARTERY WITHOUT ANGINA PECTORIS: ICD-10-CM

## 2017-11-03 DIAGNOSIS — Z85.01 PERSONAL HISTORY OF MALIGNANT NEOPLASM OF ESOPHAGUS: ICD-10-CM

## 2017-11-03 DIAGNOSIS — R79.89 OTHER SPECIFIED ABNORMAL FINDINGS OF BLOOD CHEMISTRY: ICD-10-CM

## 2017-11-03 DIAGNOSIS — Z90.49 ACQUIRED ABSENCE OF OTHER SPECIFIED PARTS OF DIGESTIVE TRACT: ICD-10-CM

## 2017-11-03 DIAGNOSIS — R07.89 OTHER CHEST PAIN: ICD-10-CM

## 2017-11-03 DIAGNOSIS — K91.89 OTHER POSTPROCEDURAL COMPLICATIONS AND DISORDERS OF DIGESTIVE SYSTEM: Chronic | ICD-10-CM

## 2017-11-03 PROCEDURE — 99233 SBSQ HOSP IP/OBS HIGH 50: CPT

## 2017-11-03 PROCEDURE — 99223 1ST HOSP IP/OBS HIGH 75: CPT

## 2017-11-03 PROCEDURE — 71260 CT THORAX DX C+: CPT | Mod: 26

## 2017-11-03 PROCEDURE — 74177 CT ABD & PELVIS W/CONTRAST: CPT | Mod: 26

## 2017-11-06 ENCOUNTER — FORM ENCOUNTER (OUTPATIENT)
Age: 82
End: 2017-11-06

## 2017-11-07 ENCOUNTER — OUTPATIENT (OUTPATIENT)
Dept: OUTPATIENT SERVICES | Facility: HOSPITAL | Age: 82
LOS: 1 days | End: 2017-11-07

## 2017-11-07 ENCOUNTER — APPOINTMENT (OUTPATIENT)
Dept: RADIOLOGY | Facility: HOSPITAL | Age: 82
End: 2017-11-07
Payer: MEDICARE

## 2017-11-07 DIAGNOSIS — Z90.89 ACQUIRED ABSENCE OF OTHER ORGANS: Chronic | ICD-10-CM

## 2017-11-07 DIAGNOSIS — Z95.5 PRESENCE OF CORONARY ANGIOPLASTY IMPLANT AND GRAFT: Chronic | ICD-10-CM

## 2017-11-07 DIAGNOSIS — Z96.649 PRESENCE OF UNSPECIFIED ARTIFICIAL HIP JOINT: Chronic | ICD-10-CM

## 2017-11-07 DIAGNOSIS — M12.9 ARTHROPATHY, UNSPECIFIED: Chronic | ICD-10-CM

## 2017-11-07 DIAGNOSIS — C15.5 MALIGNANT NEOPLASM OF LOWER THIRD OF ESOPHAGUS: ICD-10-CM

## 2017-11-07 DIAGNOSIS — K91.89 OTHER POSTPROCEDURAL COMPLICATIONS AND DISORDERS OF DIGESTIVE SYSTEM: Chronic | ICD-10-CM

## 2017-11-07 DIAGNOSIS — Z98.890 OTHER SPECIFIED POSTPROCEDURAL STATES: Chronic | ICD-10-CM

## 2017-11-07 PROCEDURE — 74220 X-RAY XM ESOPHAGUS 1CNTRST: CPT | Mod: 26

## 2017-11-08 ENCOUNTER — APPOINTMENT (OUTPATIENT)
Dept: CT IMAGING | Facility: HOSPITAL | Age: 82
End: 2017-11-08

## 2017-11-12 ENCOUNTER — EMERGENCY (EMERGENCY)
Facility: HOSPITAL | Age: 82
LOS: 1 days | End: 2017-11-12
Admitting: EMERGENCY MEDICINE
Payer: MEDICARE

## 2017-11-12 DIAGNOSIS — M12.9 ARTHROPATHY, UNSPECIFIED: Chronic | ICD-10-CM

## 2017-11-12 DIAGNOSIS — Z90.89 ACQUIRED ABSENCE OF OTHER ORGANS: Chronic | ICD-10-CM

## 2017-11-12 DIAGNOSIS — K91.89 OTHER POSTPROCEDURAL COMPLICATIONS AND DISORDERS OF DIGESTIVE SYSTEM: Chronic | ICD-10-CM

## 2017-11-12 DIAGNOSIS — Z98.890 OTHER SPECIFIED POSTPROCEDURAL STATES: Chronic | ICD-10-CM

## 2017-11-12 DIAGNOSIS — Z96.649 PRESENCE OF UNSPECIFIED ARTIFICIAL HIP JOINT: Chronic | ICD-10-CM

## 2017-11-12 DIAGNOSIS — Z95.5 PRESENCE OF CORONARY ANGIOPLASTY IMPLANT AND GRAFT: Chronic | ICD-10-CM

## 2017-11-12 PROCEDURE — 71010: CPT | Mod: 26

## 2017-11-12 PROCEDURE — 93010 ELECTROCARDIOGRAM REPORT: CPT

## 2017-11-12 PROCEDURE — 99285 EMERGENCY DEPT VISIT HI MDM: CPT

## 2017-11-13 ENCOUNTER — INPATIENT (INPATIENT)
Facility: HOSPITAL | Age: 82
LOS: 0 days | Discharge: OTHER INSTITUTION | DRG: 282 | End: 2017-11-14
Attending: INTERNAL MEDICINE | Admitting: INTERNAL MEDICINE
Payer: MEDICARE

## 2017-11-13 DIAGNOSIS — Z88.0 ALLERGY STATUS TO PENICILLIN: ICD-10-CM

## 2017-11-13 DIAGNOSIS — Z95.5 PRESENCE OF CORONARY ANGIOPLASTY IMPLANT AND GRAFT: Chronic | ICD-10-CM

## 2017-11-13 DIAGNOSIS — Z86.73 PERSONAL HISTORY OF TRANSIENT ISCHEMIC ATTACK (TIA), AND CEREBRAL INFARCTION WITHOUT RESIDUAL DEFICITS: ICD-10-CM

## 2017-11-13 DIAGNOSIS — K91.89 OTHER POSTPROCEDURAL COMPLICATIONS AND DISORDERS OF DIGESTIVE SYSTEM: Chronic | ICD-10-CM

## 2017-11-13 DIAGNOSIS — Z87.891 PERSONAL HISTORY OF NICOTINE DEPENDENCE: ICD-10-CM

## 2017-11-13 DIAGNOSIS — I21.4 NON-ST ELEVATION (NSTEMI) MYOCARDIAL INFARCTION: ICD-10-CM

## 2017-11-13 DIAGNOSIS — Z90.89 ACQUIRED ABSENCE OF OTHER ORGANS: Chronic | ICD-10-CM

## 2017-11-13 DIAGNOSIS — E78.5 HYPERLIPIDEMIA, UNSPECIFIED: ICD-10-CM

## 2017-11-13 DIAGNOSIS — I10 ESSENTIAL (PRIMARY) HYPERTENSION: ICD-10-CM

## 2017-11-13 DIAGNOSIS — M12.9 ARTHROPATHY, UNSPECIFIED: Chronic | ICD-10-CM

## 2017-11-13 DIAGNOSIS — Z95.5 PRESENCE OF CORONARY ANGIOPLASTY IMPLANT AND GRAFT: ICD-10-CM

## 2017-11-13 DIAGNOSIS — J45.909 UNSPECIFIED ASTHMA, UNCOMPLICATED: ICD-10-CM

## 2017-11-13 DIAGNOSIS — I25.110 ATHEROSCLEROTIC HEART DISEASE OF NATIVE CORONARY ARTERY WITH UNSTABLE ANGINA PECTORIS: ICD-10-CM

## 2017-11-13 DIAGNOSIS — Z96.649 PRESENCE OF UNSPECIFIED ARTIFICIAL HIP JOINT: Chronic | ICD-10-CM

## 2017-11-13 DIAGNOSIS — Z98.890 OTHER SPECIFIED POSTPROCEDURAL STATES: Chronic | ICD-10-CM

## 2017-11-13 DIAGNOSIS — G40.909 EPILEPSY, UNSPECIFIED, NOT INTRACTABLE, WITHOUT STATUS EPILEPTICUS: ICD-10-CM

## 2017-11-13 DIAGNOSIS — Z85.01 PERSONAL HISTORY OF MALIGNANT NEOPLASM OF ESOPHAGUS: ICD-10-CM

## 2017-11-13 DIAGNOSIS — Z96.649 PRESENCE OF UNSPECIFIED ARTIFICIAL HIP JOINT: ICD-10-CM

## 2017-11-13 DIAGNOSIS — N40.0 BENIGN PROSTATIC HYPERPLASIA WITHOUT LOWER URINARY TRACT SYMPTOMS: ICD-10-CM

## 2017-11-13 PROCEDURE — 93010 ELECTROCARDIOGRAM REPORT: CPT

## 2017-11-13 PROCEDURE — 99223 1ST HOSP IP/OBS HIGH 75: CPT | Mod: AI

## 2017-11-13 PROCEDURE — 99223 1ST HOSP IP/OBS HIGH 75: CPT

## 2017-11-14 ENCOUNTER — APPOINTMENT (OUTPATIENT)
Dept: THORACIC SURGERY | Facility: CLINIC | Age: 82
End: 2017-11-14

## 2017-11-14 ENCOUNTER — INPATIENT (INPATIENT)
Facility: HOSPITAL | Age: 82
LOS: 2 days | Discharge: HOME | DRG: 247 | End: 2017-11-17
Attending: INTERNAL MEDICINE | Admitting: INTERNAL MEDICINE
Payer: MEDICARE

## 2017-11-14 VITALS
SYSTOLIC BLOOD PRESSURE: 119 MMHG | TEMPERATURE: 98 F | OXYGEN SATURATION: 97 % | DIASTOLIC BLOOD PRESSURE: 67 MMHG | RESPIRATION RATE: 19 BRPM | HEART RATE: 86 BPM

## 2017-11-14 DIAGNOSIS — Z98.890 OTHER SPECIFIED POSTPROCEDURAL STATES: Chronic | ICD-10-CM

## 2017-11-14 DIAGNOSIS — I21.4 NON-ST ELEVATION (NSTEMI) MYOCARDIAL INFARCTION: ICD-10-CM

## 2017-11-14 DIAGNOSIS — Z95.5 PRESENCE OF CORONARY ANGIOPLASTY IMPLANT AND GRAFT: Chronic | ICD-10-CM

## 2017-11-14 DIAGNOSIS — Z96.649 PRESENCE OF UNSPECIFIED ARTIFICIAL HIP JOINT: Chronic | ICD-10-CM

## 2017-11-14 DIAGNOSIS — M12.9 ARTHROPATHY, UNSPECIFIED: Chronic | ICD-10-CM

## 2017-11-14 DIAGNOSIS — K91.89 OTHER POSTPROCEDURAL COMPLICATIONS AND DISORDERS OF DIGESTIVE SYSTEM: Chronic | ICD-10-CM

## 2017-11-14 DIAGNOSIS — Z90.89 ACQUIRED ABSENCE OF OTHER ORGANS: Chronic | ICD-10-CM

## 2017-11-14 PROCEDURE — 93010 ELECTROCARDIOGRAM REPORT: CPT | Mod: 77

## 2017-11-14 PROCEDURE — 93010 ELECTROCARDIOGRAM REPORT: CPT

## 2017-11-14 PROCEDURE — 99233 SBSQ HOSP IP/OBS HIGH 50: CPT | Mod: 25

## 2017-11-14 PROCEDURE — 99238 HOSP IP/OBS DSCHRG MGMT 30/<: CPT

## 2017-11-14 RX ORDER — PREGABALIN 225 MG/1
500 CAPSULE ORAL DAILY
Qty: 0 | Refills: 0 | Status: DISCONTINUED | OUTPATIENT
Start: 2017-11-14 | End: 2017-11-17

## 2017-11-14 RX ORDER — LOSARTAN POTASSIUM 100 MG/1
50 TABLET, FILM COATED ORAL DAILY
Qty: 0 | Refills: 0 | Status: DISCONTINUED | OUTPATIENT
Start: 2017-11-14 | End: 2017-11-17

## 2017-11-14 RX ORDER — ASPIRIN AND DIPYRIDAMOLE 25; 200 MG/1; MG/1
1 CAPSULE, EXTENDED RELEASE ORAL
Qty: 0 | Refills: 0 | Status: DISCONTINUED | OUTPATIENT
Start: 2017-11-14 | End: 2017-11-15

## 2017-11-14 RX ORDER — BUDESONIDE, MICRONIZED 100 %
0.5 POWDER (GRAM) MISCELLANEOUS
Qty: 0 | Refills: 0 | Status: DISCONTINUED | OUTPATIENT
Start: 2017-11-14 | End: 2017-11-17

## 2017-11-14 RX ORDER — PYRIDOXINE HCL (VITAMIN B6) 100 MG
100 TABLET ORAL DAILY
Qty: 0 | Refills: 0 | Status: DISCONTINUED | OUTPATIENT
Start: 2017-11-14 | End: 2017-11-17

## 2017-11-14 RX ORDER — ALPRAZOLAM 0.25 MG
0.5 TABLET ORAL
Qty: 0 | Refills: 0 | Status: DISCONTINUED | OUTPATIENT
Start: 2017-11-14 | End: 2017-11-15

## 2017-11-14 RX ORDER — ALPRAZOLAM 0.25 MG
2 TABLET ORAL
Qty: 0 | Refills: 0 | COMMUNITY

## 2017-11-14 RX ORDER — FOLIC ACID 0.8 MG
1 TABLET ORAL
Qty: 0 | Refills: 0 | COMMUNITY

## 2017-11-14 RX ORDER — ALBUTEROL 90 UG/1
2 AEROSOL, METERED ORAL EVERY 6 HOURS
Qty: 0 | Refills: 0 | Status: DISCONTINUED | OUTPATIENT
Start: 2017-11-14 | End: 2017-11-17

## 2017-11-14 RX ORDER — FOLIC ACID 0.8 MG
1 TABLET ORAL DAILY
Qty: 0 | Refills: 0 | Status: DISCONTINUED | OUTPATIENT
Start: 2017-11-14 | End: 2017-11-17

## 2017-11-14 RX ORDER — FENTANYL CITRATE 50 UG/ML
25 INJECTION INTRAVENOUS ONCE
Qty: 0 | Refills: 0 | Status: DISCONTINUED | OUTPATIENT
Start: 2017-11-14 | End: 2017-11-14

## 2017-11-14 RX ORDER — FINASTERIDE 5 MG/1
5 TABLET, FILM COATED ORAL DAILY
Qty: 0 | Refills: 0 | Status: DISCONTINUED | OUTPATIENT
Start: 2017-11-14 | End: 2017-11-17

## 2017-11-14 RX ORDER — AMLODIPINE BESYLATE 2.5 MG/1
5 TABLET ORAL DAILY
Qty: 0 | Refills: 0 | Status: DISCONTINUED | OUTPATIENT
Start: 2017-11-14 | End: 2017-11-17

## 2017-11-14 RX ORDER — NITROGLYCERIN 6.5 MG
0.4 CAPSULE, EXTENDED RELEASE ORAL ONCE
Qty: 0 | Refills: 0 | Status: COMPLETED | OUTPATIENT
Start: 2017-11-14 | End: 2017-11-14

## 2017-11-14 RX ORDER — MONTELUKAST 4 MG/1
10 TABLET, CHEWABLE ORAL DAILY
Qty: 0 | Refills: 0 | Status: DISCONTINUED | OUTPATIENT
Start: 2017-11-14 | End: 2017-11-17

## 2017-11-14 RX ORDER — SIMVASTATIN 20 MG/1
40 TABLET, FILM COATED ORAL AT BEDTIME
Qty: 0 | Refills: 0 | Status: DISCONTINUED | OUTPATIENT
Start: 2017-11-14 | End: 2017-11-17

## 2017-11-14 RX ORDER — LOPERAMIDE HCL 2 MG
1 TABLET ORAL
Qty: 0 | Refills: 0 | COMMUNITY

## 2017-11-14 RX ADMIN — FENTANYL CITRATE 25 MICROGRAM(S): 50 INJECTION INTRAVENOUS at 19:50

## 2017-11-14 RX ADMIN — Medication 0.4 MILLIGRAM(S): at 23:39

## 2017-11-14 RX ADMIN — ASPIRIN AND DIPYRIDAMOLE 1 CAPSULE(S): 25; 200 CAPSULE, EXTENDED RELEASE ORAL at 22:19

## 2017-11-14 RX ADMIN — ALBUTEROL 2 PUFF(S): 90 AEROSOL, METERED ORAL at 22:20

## 2017-11-14 RX ADMIN — Medication 200 MILLIGRAM(S): at 22:20

## 2017-11-14 RX ADMIN — FENTANYL CITRATE 25 MICROGRAM(S): 50 INJECTION INTRAVENOUS at 20:00

## 2017-11-14 RX ADMIN — Medication 0.5 MILLIGRAM(S): at 22:18

## 2017-11-14 RX ADMIN — SIMVASTATIN 40 MILLIGRAM(S): 20 TABLET, FILM COATED ORAL at 22:18

## 2017-11-14 NOTE — H&P CARDIOLOGY - HISTORY OF PRESENT ILLNESS
This is an 81 yo male s/p robotic-assisted Vance-Trent esophagogastrectomy with feeding J tube placement for esophageal adenocarcinoma (T1aN0) with Dr. Dyer and Dr. Christian Weinstein on 8/14/17 s/p chemo/radiation with subsequent accidental removal approx 10 days ago Esophagram done on 9/19/17 showing  a leak at the distal site just above the hemidiaphragm into the pleural space.   Patient was found to have a Left pleural effusion s/p pigtail and developed respiratory distress requiring intubation subsequently discharged. Pt. has PMH HTN, HLD, asthma, COPD, former smoker, CVA, seizure approx 15 yrs ago, Aortic regurgitation, CAD s/p stent x 3 LAD ASA/Plavix x 3 weeks 6/2017, s/p PTCA D2 8/2017, RA presented to [facility] 11/13/17 after waking up with sharp chest pain radiating to left arm with associated dyspnea relieved with NTG x 4 and ASA 81 mg po. Pt. found to be hypotensive requiring bolus 500 CC NS x 1.  Troponins 0.416>0.503>0.381>0.396.  EKG revealed SR with PVCs, RBBB, LAFB. Pt. now transferred to [facility] asymptomatic for further evaluation and cardiac cath.

## 2017-11-14 NOTE — H&P CARDIOLOGY - PMH
Anxiety    Asthma    BCC (basal cell carcinoma)  skin  BPH (benign prostatic hyperplasia)    Cerebrovascular accident (CVA)    Chronic allergic rhinitis    Coronary artery disease  s/p 3 stents on June 30, 2017 at [place] Dr. Lavelle Reid  Esophagus cancer    Former smoker, stopped smoking in distant past    HTN (hypertension)    Hyperlipidemia    OA (osteoarthritis) of knee  right  Rheumatoid arthritis    Seizure disorder  1 episode approximately 15 yrs ago

## 2017-11-15 ENCOUNTER — TRANSCRIPTION ENCOUNTER (OUTPATIENT)
Age: 82
End: 2017-11-15

## 2017-11-15 DIAGNOSIS — I25.118 ATHEROSCLEROTIC HEART DISEASE OF NATIVE CORONARY ARTERY WITH OTHER FORMS OF ANGINA PECTORIS: ICD-10-CM

## 2017-11-15 LAB
ALBUMIN SERPL ELPH-MCNC: 2.8 G/DL — LOW (ref 3.3–5)
ALP SERPL-CCNC: 192 U/L — HIGH (ref 40–120)
ALT FLD-CCNC: 60 U/L RC — HIGH (ref 10–45)
ANION GAP SERPL CALC-SCNC: 9 MMOL/L — SIGNIFICANT CHANGE UP (ref 5–17)
AST SERPL-CCNC: 58 U/L — HIGH (ref 10–40)
BASOPHILS # BLD AUTO: 0 K/UL — SIGNIFICANT CHANGE UP (ref 0–0.2)
BASOPHILS NFR BLD AUTO: 0.4 % — SIGNIFICANT CHANGE UP (ref 0–2)
BILIRUB SERPL-MCNC: 0.2 MG/DL — SIGNIFICANT CHANGE UP (ref 0.2–1.2)
BUN SERPL-MCNC: 19 MG/DL — SIGNIFICANT CHANGE UP (ref 7–23)
CALCIUM SERPL-MCNC: 9.2 MG/DL — SIGNIFICANT CHANGE UP (ref 8.4–10.5)
CHLORIDE SERPL-SCNC: 104 MMOL/L — SIGNIFICANT CHANGE UP (ref 96–108)
CO2 SERPL-SCNC: 25 MMOL/L — SIGNIFICANT CHANGE UP (ref 22–31)
CREAT SERPL-MCNC: 0.52 MG/DL — SIGNIFICANT CHANGE UP (ref 0.5–1.3)
EOSINOPHIL # BLD AUTO: 0.1 K/UL — SIGNIFICANT CHANGE UP (ref 0–0.5)
EOSINOPHIL NFR BLD AUTO: 1.9 % — SIGNIFICANT CHANGE UP (ref 0–6)
GLUCOSE SERPL-MCNC: 72 MG/DL — SIGNIFICANT CHANGE UP (ref 70–99)
HCT VFR BLD CALC: 27.7 % — LOW (ref 39–50)
HGB BLD-MCNC: 9.4 G/DL — LOW (ref 13–17)
LYMPHOCYTES # BLD AUTO: 0.7 K/UL — LOW (ref 1–3.3)
LYMPHOCYTES # BLD AUTO: 12 % — LOW (ref 13–44)
MCHC RBC-ENTMCNC: 32.6 PG — SIGNIFICANT CHANGE UP (ref 27–34)
MCHC RBC-ENTMCNC: 33.7 GM/DL — SIGNIFICANT CHANGE UP (ref 32–36)
MCV RBC AUTO: 96.6 FL — SIGNIFICANT CHANGE UP (ref 80–100)
MONOCYTES # BLD AUTO: 0.5 K/UL — SIGNIFICANT CHANGE UP (ref 0–0.9)
MONOCYTES NFR BLD AUTO: 9 % — SIGNIFICANT CHANGE UP (ref 2–14)
NEUTROPHILS # BLD AUTO: 4.4 K/UL — SIGNIFICANT CHANGE UP (ref 1.8–7.4)
NEUTROPHILS NFR BLD AUTO: 76.7 % — SIGNIFICANT CHANGE UP (ref 43–77)
PLATELET # BLD AUTO: 217 K/UL — SIGNIFICANT CHANGE UP (ref 150–400)
POTASSIUM SERPL-MCNC: 3.9 MMOL/L — SIGNIFICANT CHANGE UP (ref 3.5–5.3)
POTASSIUM SERPL-SCNC: 3.9 MMOL/L — SIGNIFICANT CHANGE UP (ref 3.5–5.3)
PROT SERPL-MCNC: 6.7 G/DL — SIGNIFICANT CHANGE UP (ref 6–8.3)
RBC # BLD: 2.87 M/UL — LOW (ref 4.2–5.8)
RBC # FLD: 15.5 % — HIGH (ref 10.3–14.5)
SODIUM SERPL-SCNC: 138 MMOL/L — SIGNIFICANT CHANGE UP (ref 135–145)
WBC # BLD: 5.7 K/UL — SIGNIFICANT CHANGE UP (ref 3.8–10.5)
WBC # FLD AUTO: 5.7 K/UL — SIGNIFICANT CHANGE UP (ref 3.8–10.5)

## 2017-11-15 PROCEDURE — 93010 ELECTROCARDIOGRAM REPORT: CPT | Mod: 76

## 2017-11-15 PROCEDURE — 93010 ELECTROCARDIOGRAM REPORT: CPT | Mod: 77

## 2017-11-15 PROCEDURE — 93458 L HRT ARTERY/VENTRICLE ANGIO: CPT | Mod: 26,59

## 2017-11-15 PROCEDURE — 92928 PRQ TCAT PLMT NTRAC ST 1 LES: CPT | Mod: RC

## 2017-11-15 RX ORDER — POTASSIUM CHLORIDE 20 MEQ
20 PACKET (EA) ORAL ONCE
Qty: 0 | Refills: 0 | Status: COMPLETED | OUTPATIENT
Start: 2017-11-15 | End: 2017-11-15

## 2017-11-15 RX ORDER — CLOPIDOGREL BISULFATE 75 MG/1
75 TABLET, FILM COATED ORAL DAILY
Qty: 0 | Refills: 0 | Status: DISCONTINUED | OUTPATIENT
Start: 2017-11-15 | End: 2017-11-17

## 2017-11-15 RX ORDER — NITROGLYCERIN 6.5 MG
0.4 CAPSULE, EXTENDED RELEASE ORAL ONCE
Qty: 0 | Refills: 0 | Status: COMPLETED | OUTPATIENT
Start: 2017-11-15 | End: 2017-11-15

## 2017-11-15 RX ORDER — METOPROLOL TARTRATE 50 MG
0.5 TABLET ORAL
Qty: 15 | Refills: 0 | OUTPATIENT
Start: 2017-11-15 | End: 2017-12-15

## 2017-11-15 RX ORDER — ENOXAPARIN SODIUM 100 MG/ML
0 INJECTION SUBCUTANEOUS
Qty: 0 | Refills: 0 | COMMUNITY

## 2017-11-15 RX ORDER — DOCUSATE SODIUM 100 MG
2 CAPSULE ORAL
Qty: 0 | Refills: 0 | COMMUNITY

## 2017-11-15 RX ORDER — METOPROLOL TARTRATE 50 MG
12.5 TABLET ORAL EVERY 12 HOURS
Qty: 0 | Refills: 0 | Status: DISCONTINUED | OUTPATIENT
Start: 2017-11-15 | End: 2017-11-17

## 2017-11-15 RX ORDER — LOPERAMIDE HCL 2 MG
1 TABLET ORAL
Qty: 0 | Refills: 0 | COMMUNITY

## 2017-11-15 RX ORDER — NITROGLYCERIN 6.5 MG
1 CAPSULE, EXTENDED RELEASE ORAL ONCE
Qty: 0 | Refills: 0 | Status: COMPLETED | OUTPATIENT
Start: 2017-11-15 | End: 2017-11-16

## 2017-11-15 RX ORDER — ALPRAZOLAM 0.25 MG
0.25 TABLET ORAL
Qty: 0 | Refills: 0 | Status: DISCONTINUED | OUTPATIENT
Start: 2017-11-15 | End: 2017-11-17

## 2017-11-15 RX ORDER — ASPIRIN/CALCIUM CARB/MAGNESIUM 324 MG
81 TABLET ORAL DAILY
Qty: 0 | Refills: 0 | Status: DISCONTINUED | OUTPATIENT
Start: 2017-11-15 | End: 2017-11-17

## 2017-11-15 RX ORDER — NITROGLYCERIN 6.5 MG
0.5 CAPSULE, EXTENDED RELEASE ORAL ONCE
Qty: 0 | Refills: 0 | Status: COMPLETED | OUTPATIENT
Start: 2017-11-15 | End: 2017-11-15

## 2017-11-15 RX ORDER — NITROGLYCERIN 6.5 MG
0.4 CAPSULE, EXTENDED RELEASE ORAL
Qty: 0 | Refills: 0 | Status: DISCONTINUED | OUTPATIENT
Start: 2017-11-15 | End: 2017-11-17

## 2017-11-15 RX ORDER — MAGNESIUM OXIDE 400 MG ORAL TABLET 241.3 MG
800 TABLET ORAL ONCE
Qty: 0 | Refills: 0 | Status: COMPLETED | OUTPATIENT
Start: 2017-11-15 | End: 2017-11-15

## 2017-11-15 RX ORDER — CLOPIDOGREL BISULFATE 75 MG/1
1 TABLET, FILM COATED ORAL
Qty: 30 | Refills: 11 | OUTPATIENT
Start: 2017-11-15 | End: 2018-11-09

## 2017-11-15 RX ORDER — FENTANYL CITRATE 50 UG/ML
25 INJECTION INTRAVENOUS ONCE
Qty: 0 | Refills: 0 | Status: DISCONTINUED | OUTPATIENT
Start: 2017-11-15 | End: 2017-11-15

## 2017-11-15 RX ORDER — ASPIRIN/CALCIUM CARB/MAGNESIUM 324 MG
1 TABLET ORAL
Qty: 0 | Refills: 0 | COMMUNITY
Start: 2017-11-15

## 2017-11-15 RX ORDER — TAMSULOSIN HYDROCHLORIDE 0.4 MG/1
1 CAPSULE ORAL
Qty: 0 | Refills: 0 | COMMUNITY

## 2017-11-15 RX ORDER — VALSARTAN 80 MG/1
1 TABLET ORAL
Qty: 0 | Refills: 0 | COMMUNITY

## 2017-11-15 RX ADMIN — Medication 0.25 MILLIGRAM(S): at 08:30

## 2017-11-15 RX ADMIN — Medication 5 MILLIGRAM(S): at 07:48

## 2017-11-15 RX ADMIN — Medication 0.5 INCH(S): at 16:16

## 2017-11-15 RX ADMIN — Medication 200 MILLIGRAM(S): at 18:31

## 2017-11-15 RX ADMIN — MONTELUKAST 10 MILLIGRAM(S): 4 TABLET, CHEWABLE ORAL at 11:34

## 2017-11-15 RX ADMIN — LOSARTAN POTASSIUM 50 MILLIGRAM(S): 100 TABLET, FILM COATED ORAL at 07:47

## 2017-11-15 RX ADMIN — Medication 0.25 MILLIGRAM(S): at 18:31

## 2017-11-15 RX ADMIN — ALBUTEROL 2 PUFF(S): 90 AEROSOL, METERED ORAL at 06:25

## 2017-11-15 RX ADMIN — SIMVASTATIN 40 MILLIGRAM(S): 20 TABLET, FILM COATED ORAL at 22:07

## 2017-11-15 RX ADMIN — Medication 100 MILLIGRAM(S): at 11:34

## 2017-11-15 RX ADMIN — MAGNESIUM OXIDE 400 MG ORAL TABLET 800 MILLIGRAM(S): 241.3 TABLET ORAL at 20:28

## 2017-11-15 RX ADMIN — Medication 1 MILLIGRAM(S): at 11:34

## 2017-11-15 RX ADMIN — Medication 0.4 MILLIGRAM(S): at 16:45

## 2017-11-15 RX ADMIN — Medication 20 MILLIEQUIVALENT(S): at 20:28

## 2017-11-15 RX ADMIN — AMLODIPINE BESYLATE 5 MILLIGRAM(S): 2.5 TABLET ORAL at 07:47

## 2017-11-15 RX ADMIN — Medication 0.4 MILLIGRAM(S): at 05:45

## 2017-11-15 RX ADMIN — Medication 0.4 MILLIGRAM(S): at 02:42

## 2017-11-15 RX ADMIN — Medication 200 MILLIGRAM(S): at 07:48

## 2017-11-15 RX ADMIN — Medication 12.5 MILLIGRAM(S): at 22:06

## 2017-11-15 RX ADMIN — Medication 0.4 MILLIGRAM(S): at 12:50

## 2017-11-15 RX ADMIN — PREGABALIN 500 MICROGRAM(S): 225 CAPSULE ORAL at 11:34

## 2017-11-15 RX ADMIN — FINASTERIDE 5 MILLIGRAM(S): 5 TABLET, FILM COATED ORAL at 11:34

## 2017-11-15 NOTE — DISCHARGE NOTE ADULT - PATIENT PORTAL LINK FT
“You can access the Follow[facility] Patient Portal, offered by [facility], by registering with the following website: [email or web address]”

## 2017-11-15 NOTE — PROGRESS NOTE ADULT - SUBJECTIVE AND OBJECTIVE BOX
Patient is a 82y old  Male who presents with a chief complaint of feeding tube clogged s/p maryjane ford (2017 20:56)          Allergies    penicillin (Rash)    Intolerances        Medications:  ALBUTerol    90 MICROgram(s) HFA Inhaler 2 Puff(s) Inhalation every 6 hours PRN  ALPRAZolam 0.5 milliGRAM(s) Oral four times a day  amLODIPine   Tablet 5 milliGRAM(s) Oral daily  buDESOnide   0.5 milliGRAM(s) Respule 0.5 milliGRAM(s) Inhalation two times a day  cyanocobalamin 500 MICROGram(s) Oral daily  dipyridamole 200 mG/aspirin 25 mG 1 Capsule(s) Oral two times a day  finasteride 5 milliGRAM(s) Oral daily  folic acid 1 milliGRAM(s) Oral daily  losartan 50 milliGRAM(s) Oral daily  montelukast 10 milliGRAM(s) Oral daily  nitroglycerin     SubLingual 0.4 milliGRAM(s) SubLingual once  phenytoin   Capsule 200 milliGRAM(s) Oral two times a day  predniSONE   Tablet 5 milliGRAM(s) Oral daily  pyridoxine 100 milliGRAM(s) Oral daily  simvastatin 40 milliGRAM(s) Oral at bedtime      Vitals:  T(C): 36.7 (11-15-17 @ 05:45), Max: 36.9 (17 @ 19:00)  HR: 85 (11-15-17 @ 05:45) (82 - 89)  BP: 101/61 (11-15-17 @ 05:45) (101/61 - 119/67)  BP(mean): 84 (17 @ 19:14) (84 - 84)  RR: 17 (11-15-17 @ 05:45) (17 - 19)  SpO2: 99% (11-15-17 @ 05:45) (97% - 99%)  Wt(kg): --  Daily Height in cm: 160.02 (2017 19:14)    Daily Weight in k.4 (2017 19:14)  I&O's Summary    2017 07:01  -  15 Nov 2017 06:25  --------------------------------------------------------  IN: 100 mL / OUT: 300 mL / NET: -200 mL          Physical Exam:  Appearance: Normal  Eyes: PERRL, EOMI  HENT: Normal oral [place], NC/AT  Cardiovascular: S1S2, RRR, No M/R/G, no JVD, No Lower extremity edema  Procedural Access Site: No hematoma, Non-tender to palpation, 2+ pulse, No bruit, No Ecchymosis  Respiratory: Clear to auscultation bilaterally  Gastrointestinal: Soft, Non tender, Normal Bowel Sounds  Musculoskeletal: No clubbing, No joint deformity   Neurologic: Non-focal  Lymphatic: No lymphadenopathy  Psychiatry: AAOx3, Mood & affect appropriate  Skin: No rashes, No ecchymoses, No cyanosis    11-15    138  |  104  |  19  ----------------------------<  72  3.9   |  25  |  0.52    Ca    9.2      15 Nov 2017 02:58    TPro  6.7  /  Alb  2.8<L>  /  TBili  0.2  /  DBili  x   /  AST  58<H>  /  ALT  60<H>  /  AlkPhos  192<H>  11-15            Lipid panel   Hgb A1c                         9.4    5.7   )-----------( 217      ( 15 Nov 2017 02:58 )             27.7         ECG: SR 1st degree AV block 79 bpm    Cath:    Imaging:    Interpretation of Telemetry:

## 2017-11-15 NOTE — PROGRESS NOTE ADULT - ASSESSMENT
HPI:  This is an 83 yo male s/p robotic-assisted [place] esophagogastrectomy with feeding J tube placement for esophageal adenocarcinoma (T1aN0) with Dr. Dyer and Dr. Christian Weinstein on 8/14/17 s/p chemo/radiation with subsequent accidental removal approx 10 days ago Esophagram done on 9/19/17 showing  a leak at the distal site just above the hemidiaphragm into the pleural space.   Patient was found to have a Left pleural effusion s/p pigtail and developed respiratory distress requiring intubation subsequently discharged. Pt. has PMH HTN, HLD, asthma, COPD, former smoker, CVA, seizure approx 15 yrs ago, Aortic regurgitation, CAD s/p stent x 3 LAD ASA/Plavix x 3 weeks 6/2017, s/p PTCA D2 8/2017, RA presented to [facility] 11/13/17 after waking up with sharp chest pain radiating to left arm with associated dyspnea relieved with NTG x 4 and ASA 81 mg po. Pt. found to be hypotensive requiring bolus 500 CC NS x 1.  Troponins 0.416>0.503>0.381>0.396.  EKG revealed SR with PVCs, RBBB, LAFB. Pt. now transferred to [facility] asymptomatic for further evaluation and cardiac cath. (14 Nov 2017 19:14)

## 2017-11-15 NOTE — DISCHARGE NOTE ADULT - CARE PROVIDER_API CALL
Lavelle Reid), Cardiovascular Disease; Interventional Cardiology  [address] [place] [id number]  Phone: [phone]  Fax: [phone]

## 2017-11-15 NOTE — DISCHARGE NOTE ADULT - PLAN OF CARE
Pt remains chest pain free and understands post cath discharge instructions Do not stop you Aspirin or Plavix unless instructed to do so by your cardiologist.   No heavy lifting, strenuous activity, bending, straining, or unnecessary stair climbing for 2 weeks. No driving for 2 days. You may shower 24 hours following the procedure but avoid baths/swimming for 1 week. Check your groin site for bleeding and/or swelling daily following procedure and call your doctor immediately if it occurs or if you experience increased pain at the site. Follow up with your cardiologist in 1-2 weeks. You may call [facility] if you have any questions/concerns regarding your procedure [phone].   Lifestyle modification: include healthy habits to prevent stroke and future CAD  Low salt, low fat diet.   Weight management.   Take medications as prescribed.    No smoking.  Follow up with your cardiologist or primary doctor in 1- 2 weeks. Your LDL cholesterol will be less than 70mg/dL Continue with your cholesterol medications. Eat a heart healthy diet that is low in saturated fats and salt, and includes whole grains, fruits, vegetables and lean protein; exercise regularly (consult with your physician or cardiologist first); maintain a heart healthy weight; if you smoke - quit (A resource to help you stop smoking is the [facility] – phone number [phone].). Continue to follow with your primary physician or cardiologist. Your blood pressure will be controlled. Continue with your blood pressure medications; eat a heart healthy diet with low salt diet; exercise regularly (consult with your physician or cardiologist first); maintain a heart healthy weight; if you smoke - quit (A resource to help you stop smoking is the [facility] – phone number [phone].); include healthy ways to manage stress. Continue to follow with your primary care physician or cardiologist.

## 2017-11-15 NOTE — DISCHARGE NOTE ADULT - CARE PLAN
Principal Discharge DX:	Coronary artery disease due to lipid rich plaque  Goal:	Pt remains chest pain free and understands post cath discharge instructions  Instructions for follow-up, activity and diet:	Do not stop you Aspirin or Plavix unless instructed to do so by your cardiologist.   No heavy lifting, strenuous activity, bending, straining, or unnecessary stair climbing for 2 weeks. No driving for 2 days. You may shower 24 hours following the procedure but avoid baths/swimming for 1 week. Check your groin site for bleeding and/or swelling daily following procedure and call your doctor immediately if it occurs or if you experience increased pain at the site. Follow up with your cardiologist in 1-2 weeks. You may call [facility] if you have any questions/concerns regarding your procedure [phone].   Lifestyle modification: include healthy habits to prevent stroke and future CAD  Low salt, low fat diet.   Weight management.   Take medications as prescribed.    No smoking.  Follow up with your cardiologist or primary doctor in 1- 2 weeks.  Secondary Diagnosis:	Mixed hyperlipidemia  Goal:	Your LDL cholesterol will be less than 70mg/dL  Instructions for follow-up, activity and diet:	Continue with your cholesterol medications. Eat a heart healthy diet that is low in saturated fats and salt, and includes whole grains, fruits, vegetables and lean protein; exercise regularly (consult with your physician or cardiologist first); maintain a heart healthy weight; if you smoke - quit (A resource to help you stop smoking is the [facility] [employer] for Tobacco Control – phone number [phone].). Continue to follow with your primary physician or cardiologist.  Secondary Diagnosis:	Essential hypertension  Goal:	Your blood pressure will be controlled.  Instructions for follow-up, activity and diet:	Continue with your blood pressure medications; eat a heart healthy diet with low salt diet; exercise regularly (consult with your physician or cardiologist first); maintain a heart healthy weight; if you smoke - quit (A resource to help you stop smoking is the [facility] [employer] [facility] – phone number [phone].); include healthy ways to manage stress. Continue to follow with your primary care physician or cardiologist.

## 2017-11-15 NOTE — DISCHARGE NOTE ADULT - REASON FOR ADMISSION
feeding tube clogged s/p maryjane ford feeding tube clogged s/p maryjane ford  cardiac cath cardiac cath Chest pain

## 2017-11-15 NOTE — PROGRESS NOTE ADULT - SUBJECTIVE AND OBJECTIVE BOX
Removal of Femoral Sheath    Pulses in the right lower extremity are palpable). The patient was placed in the supine position. The insertion site was identified and the sutures were removed per protocol.  The 6____ Malay femoral sheath was then removed. Direct pressure was applied for  __20____ minutes.     Monitoring of the right groin and both lower extremities including neuro-vascular checks and vital signs every 15 minutes x 4, then every 30 minutes x 2, then every 1 hour was ordered.    Complications: None    Comments: patient complained of chest pain post procedure. Serial EKG were performed with no ST changes. One of the EKG's had new t wave inversions which were relayed to Dr. Reid and Fellow Anna. Patient was given sublingual nitro x 2 and the chest pain resolved.    GORGE Woodard [id number]

## 2017-11-15 NOTE — DISCHARGE NOTE ADULT - MEDICATION SUMMARY - MEDICATIONS TO STOP TAKING
I will STOP taking the medications listed below when I get home from the hospital:    tamsulosin 0.4 mg oral capsule  -- 1 cap(s) by mouth once a day in pm  hosp    Imodium 2 mg oral capsule  -- 1 cap(s) by mouth once  hosp    Colace 100 mg oral capsule  -- 2 cap(s) by mouth once a day  hosp    valsartan 160 mg oral tablet  -- 1 tab(s) by mouth once a day  hosp    Lovenox 40 mg/0.4 mL injectable solution  -- injectable once a day  hosp last dose 11/14 0900

## 2017-11-15 NOTE — DISCHARGE NOTE ADULT - MEDICATION SUMMARY - MEDICATIONS TO TAKE
I will START or STAY ON the medications listed below when I get home from the hospital:    finasteride 5 mg oral tablet  -- 1 tab(s) by mouth once a day in pm  home/hosp  -- Indication: For bph    Pulmicort Respules 0.5 mg/2 mL inhalation suspension  -- 2 milliliter(s) inhaled 2 times a day  home/hosp  -- Indication: For Copd    predniSONE 5 mg oral tablet  -- 1 tab(s) by mouth once a day in am  home  -- Indication: For Copd    aspirin 81 mg oral delayed release tablet  -- 1 tab(s) by mouth once a day  -- Indication: For Cad    olmesartan 20 mg oral tablet  -- 1 tab(s) by mouth once a day in pm  home  -- Indication: For htn    Dilantin 100 mg oral capsule  -- 2 cap(s) by mouth 2 times a day  home/hosp  -- Indication: For d    simvastatin 40 mg oral tablet  -- 1 tab(s) by mouth once a day in pm  home/hosp  -- Indication: For hld    Zetia 10 mg oral tablet  -- 1 tab(s) by mouth once a day in pm  home  -- Indication: For hld    aspirin-dipyridamole 25 mg-200 mg oral capsule, extended release  -- 1 cap(s) by mouth 2 times a day  home/hosp  -- Indication: For Cardiac     clopidogrel 75 mg oral tablet  -- 1 tab(s) by mouth once a day MDD:1  -- Indication: For Cad    ALPRAZolam 0.25 mg oral tablet  -- 2 tab(s) by mouth 4 times a day  home/hosp  -- Indication: For anxiety    metoprolol tartrate 25 mg oral tablet  -- 0.5 tab(s) by mouth 2 times a day MDD:1tab  -- Indication: For Cad    ProAir HFA 90 mcg/inh inhalation aerosol  -- 2 puff(s) inhaled 4 times a day, As Needed   home/hosp  -- Indication: For Copd    amLODIPine 5 mg oral tablet  -- 1 tab(s) by mouth once a day in pm  home/hosp  -- Indication: For htn    montelukast 10 mg oral tablet  -- 1 tab(s) by mouth once a day in pm  hosp/home  -- Indication: For Copd    Vitamin B-12 500 mcg oral tablet  -- 1 tab(s) by mouth once a day  home/hosp  -- Indication: For vit    Vitamin B6 100 mg oral tablet  -- 1 tab(s) by mouth once a day  home/hosp  -- Indication: For vit    folic acid 1 mg oral tablet  -- 1 tab(s) by mouth once a day  home/hosp  -- Indication: For vit

## 2017-11-15 NOTE — DISCHARGE NOTE ADULT - HOSPITAL COURSE
83 yo male s/p robotic-assisted [place]-T[place] esophagogastrectomy with feeding J tube placement for esophageal adenocarcinoma (T1aN0) with Dr. Dyer and Dr. Christian Weinstein on 8/14/17 s/p chemo/radiation with subsequent accidental removal approx 10 days ago Esophagram done on 9/19/17 showing  a leak at the distal site just above the hemidiaphragm into the pleural space.   Patient was found to have a Left pleural effusion s/p pigtail and developed respiratory distress requiring intubation subsequently discharged. Pt. has PMH HTN, HLD, asthma, COPD, former smoker, CVA, seizure approx 15 yrs ago, Aortic regurgitation, CAD s/p stent x 3 LAD ASA/Plavix x 3 weeks 6/2017, s/p PTCA D2 8/2017, RA presented to [facility] 11/13/17 after waking up with sharp chest pain radiating to left arm with associated dyspnea relieved with NTG x 4 and ASA 81 mg po. Pt. found to be hypotensive requiring bolus 500 CC NS x 1.  Troponins 0.416>0.503>0.381>0.396.  EKG revealed SR with PVCs, RBBB, LAFB. Pt. now transferred to [facility] asymptomatic for further evaluation and cardiac cath.   Pt s/p PCI: ROJAS x1 to RCA via R groin, and staged PCI: 83 yo male s/p robotic-assisted [place]-Trent esophagogastrectomy with feeding J tube placement for esophageal adenocarcinoma (T1aN0) with Dr. Dyer and Dr. Christian Weinstein on 8/14/17 s/p chemo/radiation with subsequent accidental removal approx 10 days ago Esophagram done on 9/19/17 showing  a leak at the distal site just above the hemidiaphragm into the pleural space.   Patient was found to have a Left pleural effusion s/p pigtail and developed respiratory distress requiring intubation subsequently discharged. Pt. has PMH HTN, HLD, asthma, COPD, former smoker, CVA, seizure approx 15 yrs ago, Aortic regurgitation, CAD s/p stent x 3 LAD ASA/Plavix x 3 weeks 6/2017, s/p PTCA D2 8/2017, RA presented to [facility] 11/13/17 after waking up with sharp chest pain radiating to left arm with associated dyspnea relieved with NTG x 4 and ASA 81 mg po. Pt. found to be hypotensive requiring bolus 500 CC NS x 1.  Troponins 0.416>0.503>0.381>0.396.  EKG revealed SR with PVCs, RBBB, LAFB. Pt. now transferred to [facility] asymptomatic for further evaluation and cardiac cath.   Pt s/p PCI: ROJAS x1 to RCA via R groin, and staged PCI: ROJAS x1 to D1 via R groin. Pt tolerated procedure well and overnight remained uneventful. No c/o chest pain or SOB. Pt is hemodynamically stable, EKG and all lab results reviewed. Insertion/incision site benign, no bleeding or hematoma, and cath site dressing changed. Discharge teaching provided to Pt/caretaker and verbalized understanding the instruction. Pt is stable for discharge home as per attending.

## 2017-11-16 LAB
ALBUMIN SERPL ELPH-MCNC: 2.7 G/DL — LOW (ref 3.3–5)
ALP SERPL-CCNC: 200 U/L — HIGH (ref 40–120)
ALT FLD-CCNC: 53 U/L RC — HIGH (ref 10–45)
ANION GAP SERPL CALC-SCNC: 8 MMOL/L — SIGNIFICANT CHANGE UP (ref 5–17)
AST SERPL-CCNC: 52 U/L — HIGH (ref 10–40)
BILIRUB DIRECT SERPL-MCNC: 0.1 MG/DL — SIGNIFICANT CHANGE UP (ref 0–0.2)
BILIRUB INDIRECT FLD-MCNC: 0.1 MG/DL — LOW (ref 0.2–1)
BILIRUB SERPL-MCNC: 0.2 MG/DL — SIGNIFICANT CHANGE UP (ref 0.2–1.2)
BUN SERPL-MCNC: 16 MG/DL — SIGNIFICANT CHANGE UP (ref 7–23)
CALCIUM SERPL-MCNC: 9.1 MG/DL — SIGNIFICANT CHANGE UP (ref 8.4–10.5)
CHLORIDE SERPL-SCNC: 105 MMOL/L — SIGNIFICANT CHANGE UP (ref 96–108)
CHOLEST SERPL-MCNC: 160 MG/DL — SIGNIFICANT CHANGE UP (ref 10–199)
CO2 SERPL-SCNC: 25 MMOL/L — SIGNIFICANT CHANGE UP (ref 22–31)
CREAT SERPL-MCNC: 0.58 MG/DL — SIGNIFICANT CHANGE UP (ref 0.5–1.3)
GLUCOSE SERPL-MCNC: 108 MG/DL — HIGH (ref 70–99)
HCT VFR BLD CALC: 28.1 % — LOW (ref 39–50)
HDLC SERPL-MCNC: 62 MG/DL — SIGNIFICANT CHANGE UP (ref 40–125)
HGB BLD-MCNC: 9.3 G/DL — LOW (ref 13–17)
LIPID PNL WITH DIRECT LDL SERPL: 78 MG/DL — SIGNIFICANT CHANGE UP
MCHC RBC-ENTMCNC: 31.7 PG — SIGNIFICANT CHANGE UP (ref 27–34)
MCHC RBC-ENTMCNC: 33 GM/DL — SIGNIFICANT CHANGE UP (ref 32–36)
MCV RBC AUTO: 96 FL — SIGNIFICANT CHANGE UP (ref 80–100)
PLATELET # BLD AUTO: 206 K/UL — SIGNIFICANT CHANGE UP (ref 150–400)
POTASSIUM SERPL-MCNC: 4.4 MMOL/L — SIGNIFICANT CHANGE UP (ref 3.5–5.3)
POTASSIUM SERPL-SCNC: 4.4 MMOL/L — SIGNIFICANT CHANGE UP (ref 3.5–5.3)
PROT SERPL-MCNC: 6.6 G/DL — SIGNIFICANT CHANGE UP (ref 6–8.3)
RBC # BLD: 2.92 M/UL — LOW (ref 4.2–5.8)
RBC # FLD: 15.5 % — HIGH (ref 10.3–14.5)
SODIUM SERPL-SCNC: 138 MMOL/L — SIGNIFICANT CHANGE UP (ref 135–145)
TOTAL CHOLESTEROL/HDL RATIO MEASUREMENT: 2.6 RATIO — LOW (ref 3.4–9.6)
TRIGL SERPL-MCNC: 98 MG/DL — SIGNIFICANT CHANGE UP (ref 10–149)
WBC # BLD: 4.6 K/UL — SIGNIFICANT CHANGE UP (ref 3.8–10.5)
WBC # FLD AUTO: 4.6 K/UL — SIGNIFICANT CHANGE UP (ref 3.8–10.5)

## 2017-11-16 PROCEDURE — 92928 PRQ TCAT PLMT NTRAC ST 1 LES: CPT | Mod: LD

## 2017-11-16 PROCEDURE — 93010 ELECTROCARDIOGRAM REPORT: CPT | Mod: 76

## 2017-11-16 PROCEDURE — 99152 MOD SED SAME PHYS/QHP 5/>YRS: CPT

## 2017-11-16 PROCEDURE — 92921: CPT | Mod: LD

## 2017-11-16 RX ORDER — MAGNESIUM OXIDE 400 MG ORAL TABLET 241.3 MG
800 TABLET ORAL ONCE
Qty: 0 | Refills: 0 | Status: COMPLETED | OUTPATIENT
Start: 2017-11-16 | End: 2017-11-16

## 2017-11-16 RX ORDER — FENTANYL CITRATE 50 UG/ML
25 INJECTION INTRAVENOUS ONCE
Qty: 0 | Refills: 0 | Status: DISCONTINUED | OUTPATIENT
Start: 2017-11-16 | End: 2017-11-16

## 2017-11-16 RX ADMIN — AMLODIPINE BESYLATE 5 MILLIGRAM(S): 2.5 TABLET ORAL at 05:46

## 2017-11-16 RX ADMIN — Medication 100 MILLIGRAM(S): at 12:20

## 2017-11-16 RX ADMIN — LOSARTAN POTASSIUM 50 MILLIGRAM(S): 100 TABLET, FILM COATED ORAL at 05:46

## 2017-11-16 RX ADMIN — Medication 200 MILLIGRAM(S): at 05:47

## 2017-11-16 RX ADMIN — Medication 5 MILLIGRAM(S): at 05:46

## 2017-11-16 RX ADMIN — Medication 0.25 MILLIGRAM(S): at 12:20

## 2017-11-16 RX ADMIN — PREGABALIN 500 MICROGRAM(S): 225 CAPSULE ORAL at 12:20

## 2017-11-16 RX ADMIN — Medication 0.5 MILLIGRAM(S): at 05:00

## 2017-11-16 RX ADMIN — Medication 1 INCH(S): at 00:15

## 2017-11-16 RX ADMIN — Medication 0.25 MILLIGRAM(S): at 05:46

## 2017-11-16 RX ADMIN — MONTELUKAST 10 MILLIGRAM(S): 4 TABLET, CHEWABLE ORAL at 12:20

## 2017-11-16 RX ADMIN — Medication 1 INCH(S): at 12:10

## 2017-11-16 RX ADMIN — Medication 0.4 MILLIGRAM(S): at 00:06

## 2017-11-16 RX ADMIN — MAGNESIUM OXIDE 400 MG ORAL TABLET 800 MILLIGRAM(S): 241.3 TABLET ORAL at 22:25

## 2017-11-16 RX ADMIN — FENTANYL CITRATE 25 MICROGRAM(S): 50 INJECTION INTRAVENOUS at 15:50

## 2017-11-16 RX ADMIN — FINASTERIDE 5 MILLIGRAM(S): 5 TABLET, FILM COATED ORAL at 12:21

## 2017-11-16 RX ADMIN — Medication 1 MILLIGRAM(S): at 12:21

## 2017-11-16 RX ADMIN — Medication 200 MILLIGRAM(S): at 18:45

## 2017-11-16 RX ADMIN — FENTANYL CITRATE 25 MICROGRAM(S): 50 INJECTION INTRAVENOUS at 15:20

## 2017-11-16 RX ADMIN — Medication 12.5 MILLIGRAM(S): at 05:46

## 2017-11-16 RX ADMIN — Medication 0.25 MILLIGRAM(S): at 18:45

## 2017-11-16 RX ADMIN — CLOPIDOGREL BISULFATE 75 MILLIGRAM(S): 75 TABLET, FILM COATED ORAL at 05:49

## 2017-11-16 RX ADMIN — Medication 12.5 MILLIGRAM(S): at 18:45

## 2017-11-16 RX ADMIN — Medication 81 MILLIGRAM(S): at 05:49

## 2017-11-16 NOTE — PROGRESS NOTE ADULT - SUBJECTIVE AND OBJECTIVE BOX
Subjective/Objective:  Patient is a 82y old  Male who presents with a chief complaint of feeding tube clogged s/p maryjane logna  cardiac cath (15 Nov 2017 11:50)  S/P PCI: ROJAS x1 to RCA via R groin.     Allergies    penicillin (Rash)    Intolerances      Medications:  ALBUTerol    90 MICROgram(s) HFA Inhaler 2 Puff(s) Inhalation every 6 hours PRN  ALPRAZolam 0.25 milliGRAM(s) Oral four times a day  amLODIPine   Tablet 5 milliGRAM(s) Oral daily  aspirin enteric coated 81 milliGRAM(s) Oral daily  buDESOnide   0.5 milliGRAM(s) Respule 0.5 milliGRAM(s) Inhalation two times a day  clopidogrel Tablet 75 milliGRAM(s) Oral daily  cyanocobalamin 500 MICROGram(s) Oral daily  finasteride 5 milliGRAM(s) Oral daily  folic acid 1 milliGRAM(s) Oral daily  losartan 50 milliGRAM(s) Oral daily  metoprolol     tartrate 12.5 milliGRAM(s) Oral every 12 hours  montelukast 10 milliGRAM(s) Oral daily  nitroglycerin     SubLingual 0.4 milliGRAM(s) SubLingual every 5 minutes PRN  phenytoin   Capsule 200 milliGRAM(s) Oral two times a day  predniSONE   Tablet 5 milliGRAM(s) Oral daily  pyridoxine 100 milliGRAM(s) Oral daily  simvastatin 40 milliGRAM(s) Oral at bedtime      Review of Systems:   No c/o chest pain or SOB, and all others negative.    Vitals:  T(C): 36.3 (11-16-17 @ 05:03), Max: 36.7 (11-15-17 @ 05:45)  HR: 76 (11-16-17 @ 05:03) (74 - 85)  BP: 122/64 (11-16-17 @ 05:03) (100/64 - 131/69)  BP(mean): --  RR: 17 (11-16-17 @ 05:03) (17 - 19)  SpO2: 99% (11-16-17 @ 05:03) (95% - 100%)  Wt(kg): --  Daily     Daily   I&O's Summary    14 Nov 2017 07:01  -  15 Nov 2017 07:00  --------------------------------------------------------  IN: 100 mL / OUT: 300 mL / NET: -200 mL    15 Nov 2017 07:01  -  16 Nov 2017 05:36  --------------------------------------------------------  IN: 330 mL / OUT: 250 mL / NET: 80 mL      Physical Exam:  Appearance: Pt in NAD, non-toxic  Cardiovascular: S1 S2  Cath Site: No evidence of bleeding or hematoma, Non-tender to palpation, 2+ distal pulses  Respiratory: Clear to auscultation bilaterally  Gastrointestinal: Soft, NT/ND, Bowel Sounds +  Neurologic: Non-focal                          9.3    4.6   )-----------( 206      ( 16 Nov 2017 00:17 )             28.1     11-16    138  |  105  |  16  ----------------------------<  108<H>  4.4   |  25  |  0.58    Ca    9.1      16 Nov 2017 00:17    TPro  6.6  /  Alb  2.7<L>  /  TBili  0.2  /  DBili  0.1  /  AST  52<H>  /  ALT  53<H>  /  AlkPhos  200<H>  11-16            Lipid panel   Hgb A1c     ECG: SR at HR 74 with RBBB and LBBB, no significant changes from previous EKG.    Interpretation of Telemetry: SR at HR 70-80's.  No special event over night.    Assessment/Plan:   S/P PCI: ROJAS x1 to RCA via R groin. Pt tolerated procedure well and overnight remained uneventful. No c/o chest pain or SOB. Pt is hemodynamically stable, EKG and all lab results reviewed. Noted elevated liver's enzam, and need to f/u LFT and Lipids this morning. Insertion/incision site benign, no bleeding or hematoma, and cath site dressing changed.  Plan to have staged PCI today.   cont assess and minotor. Subjective/Objective:  Patient is a 82y old  Male who presents with a chief complaint of feeding tube clogged s/p maryjane logan  cardiac cath (15 Nov 2017 11:50)  S/P PCI: ROJAS x1 to RCA via R groin.     Allergies    penicillin (Rash)    Intolerances      Medications:  ALBUTerol    90 MICROgram(s) HFA Inhaler 2 Puff(s) Inhalation every 6 hours PRN  ALPRAZolam 0.25 milliGRAM(s) Oral four times a day  amLODIPine   Tablet 5 milliGRAM(s) Oral daily  aspirin enteric coated 81 milliGRAM(s) Oral daily  buDESOnide   0.5 milliGRAM(s) Respule 0.5 milliGRAM(s) Inhalation two times a day  clopidogrel Tablet 75 milliGRAM(s) Oral daily  cyanocobalamin 500 MICROGram(s) Oral daily  finasteride 5 milliGRAM(s) Oral daily  folic acid 1 milliGRAM(s) Oral daily  losartan 50 milliGRAM(s) Oral daily  metoprolol     tartrate 12.5 milliGRAM(s) Oral every 12 hours  montelukast 10 milliGRAM(s) Oral daily  nitroglycerin     SubLingual 0.4 milliGRAM(s) SubLingual every 5 minutes PRN  phenytoin   Capsule 200 milliGRAM(s) Oral two times a day  predniSONE   Tablet 5 milliGRAM(s) Oral daily  pyridoxine 100 milliGRAM(s) Oral daily  simvastatin 40 milliGRAM(s) Oral at bedtime      Review of Systems:   No c/o chest pain or SOB, and all others negative.    Vitals:  T(C): 36.3 (11-16-17 @ 05:03), Max: 36.7 (11-15-17 @ 05:45)  HR: 76 (11-16-17 @ 05:03) (74 - 85)  BP: 122/64 (11-16-17 @ 05:03) (100/64 - 131/69)  BP(mean): --  RR: 17 (11-16-17 @ 05:03) (17 - 19)  SpO2: 99% (11-16-17 @ 05:03) (95% - 100%)  Wt(kg): --  Daily     Daily   I&O's Summary    14 Nov 2017 07:01  -  15 Nov 2017 07:00  --------------------------------------------------------  IN: 100 mL / OUT: 300 mL / NET: -200 mL    15 Nov 2017 07:01  -  16 Nov 2017 05:36  --------------------------------------------------------  IN: 330 mL / OUT: 250 mL / NET: 80 mL      Physical Exam:  Appearance: Pt in NAD, non-toxic  Cardiovascular: S1 S2  Cath Site: No evidence of bleeding or hematoma, Non-tender to palpation, 2+ distal pulses  Respiratory: Clear to auscultation bilaterally  Gastrointestinal: Soft, NT/ND, Bowel Sounds +  Neurologic: Non-focal                          9.3    4.6   )-----------( 206      ( 16 Nov 2017 00:17 )             28.1     11-16    138  |  105  |  16  ----------------------------<  108<H>  4.4   |  25  |  0.58    Ca    9.1      16 Nov 2017 00:17    TPro  6.6  /  Alb  2.7<L>  /  TBili  0.2  /  DBili  0.1  /  AST  52<H>  /  ALT  53<H>  /  AlkPhos  200<H>  11-16            Lipid panel   Hgb A1c     ECG: SR at HR 74 with RBBB and LBBB, no significant changes from previous EKG.    Interpretation of Telemetry: SR at HR 70-80's.  No special event over night.    Assessment/Plan:   S/P PCI: ROJAS x1 to RCA via R groin. Pt tolerated procedure well. Pt do c/o chest pressure last night, NTG 0.4mg SL with good result, no more c/o chest pain or SOB for the rest of night. Pt is hemodynamically stable, EKG and all lab results reviewed. Noted elevated liver's enzam, and need to f/u LFT and Lipids this morning. Insertion/incision site benign, no bleeding or hematoma, and cath site dressing changed.  Plan to have staged PCI today.   cont assess and minotor.

## 2017-11-16 NOTE — CHART NOTE - NSCHARTNOTEFT_GEN_A_CORE
Removal of Femoral Sheath    Pulses in the right lower extremity are palpable. The patient was placed in the supine position. The insertion site was identified and the sutures were removed per protocol.  The __6__ [place] femoral sheath was then removed. Direct pressure was applied for  _20_____ minutes.     Monitoring of the (right/left) groin and both lower extremities including neuro-vascular checks and vital signs every 15 minutes x 4, then every 30 minutes x 2, then every 1 hour was ordered.    Complications: None    Comments: Denies any chest pain/ groin pain/ leg pain

## 2017-11-17 ENCOUNTER — APPOINTMENT (OUTPATIENT)
Dept: PULMONOLOGY | Facility: CLINIC | Age: 82
End: 2017-11-17

## 2017-11-17 VITALS
TEMPERATURE: 97 F | RESPIRATION RATE: 17 BRPM | HEART RATE: 81 BPM | OXYGEN SATURATION: 98 % | SYSTOLIC BLOOD PRESSURE: 118 MMHG | DIASTOLIC BLOOD PRESSURE: 73 MMHG

## 2017-11-17 LAB
ANION GAP SERPL CALC-SCNC: 13 MMOL/L — SIGNIFICANT CHANGE UP (ref 5–17)
BUN SERPL-MCNC: 15 MG/DL — SIGNIFICANT CHANGE UP (ref 7–23)
CALCIUM SERPL-MCNC: 9.4 MG/DL — SIGNIFICANT CHANGE UP (ref 8.4–10.5)
CHLORIDE SERPL-SCNC: 104 MMOL/L — SIGNIFICANT CHANGE UP (ref 96–108)
CO2 SERPL-SCNC: 21 MMOL/L — LOW (ref 22–31)
CREAT SERPL-MCNC: 0.51 MG/DL — SIGNIFICANT CHANGE UP (ref 0.5–1.3)
GLUCOSE SERPL-MCNC: 76 MG/DL — SIGNIFICANT CHANGE UP (ref 70–99)
HCT VFR BLD CALC: 29.2 % — LOW (ref 39–50)
HGB BLD-MCNC: 9.8 G/DL — LOW (ref 13–17)
MCHC RBC-ENTMCNC: 32.2 PG — SIGNIFICANT CHANGE UP (ref 27–34)
MCHC RBC-ENTMCNC: 33.7 GM/DL — SIGNIFICANT CHANGE UP (ref 32–36)
MCV RBC AUTO: 95.5 FL — SIGNIFICANT CHANGE UP (ref 80–100)
PLATELET # BLD AUTO: 212 K/UL — SIGNIFICANT CHANGE UP (ref 150–400)
POTASSIUM SERPL-MCNC: 4.1 MMOL/L — SIGNIFICANT CHANGE UP (ref 3.5–5.3)
POTASSIUM SERPL-SCNC: 4.1 MMOL/L — SIGNIFICANT CHANGE UP (ref 3.5–5.3)
RBC # BLD: 3.06 M/UL — LOW (ref 4.2–5.8)
RBC # FLD: 15.4 % — HIGH (ref 10.3–14.5)
SODIUM SERPL-SCNC: 138 MMOL/L — SIGNIFICANT CHANGE UP (ref 135–145)
WBC # BLD: 5.8 K/UL — SIGNIFICANT CHANGE UP (ref 3.8–10.5)
WBC # FLD AUTO: 5.8 K/UL — SIGNIFICANT CHANGE UP (ref 3.8–10.5)

## 2017-11-17 PROCEDURE — 93010 ELECTROCARDIOGRAM REPORT: CPT

## 2017-11-17 RX ADMIN — Medication 12.5 MILLIGRAM(S): at 05:23

## 2017-11-17 RX ADMIN — SIMVASTATIN 40 MILLIGRAM(S): 20 TABLET, FILM COATED ORAL at 00:25

## 2017-11-17 RX ADMIN — Medication 5 MILLIGRAM(S): at 05:23

## 2017-11-17 RX ADMIN — Medication 200 MILLIGRAM(S): at 05:25

## 2017-11-17 RX ADMIN — Medication 81 MILLIGRAM(S): at 05:23

## 2017-11-17 RX ADMIN — LOSARTAN POTASSIUM 50 MILLIGRAM(S): 100 TABLET, FILM COATED ORAL at 05:23

## 2017-11-17 RX ADMIN — Medication 0.25 MILLIGRAM(S): at 05:23

## 2017-11-17 RX ADMIN — Medication 0.25 MILLIGRAM(S): at 00:25

## 2017-11-17 RX ADMIN — CLOPIDOGREL BISULFATE 75 MILLIGRAM(S): 75 TABLET, FILM COATED ORAL at 05:26

## 2017-11-17 RX ADMIN — AMLODIPINE BESYLATE 5 MILLIGRAM(S): 2.5 TABLET ORAL at 05:23

## 2017-11-17 NOTE — PROGRESS NOTE ADULT - SUBJECTIVE AND OBJECTIVE BOX
Subjective/Objective:  Patient is a 82y old  Male who presents with a chief complaint of cardiac cath (15 Nov 2017 11:50)  S/P staged PCI: ROJAS x1 to D1 via R groin.     Allergies    penicillin (Rash)    Intolerances      Medications:  ALBUTerol    90 MICROgram(s) HFA Inhaler 2 Puff(s) Inhalation every 6 hours PRN  ALPRAZolam 0.25 milliGRAM(s) Oral four times a day  amLODIPine   Tablet 5 milliGRAM(s) Oral daily  aspirin enteric coated 81 milliGRAM(s) Oral daily  buDESOnide   0.5 milliGRAM(s) Respule 0.5 milliGRAM(s) Inhalation two times a day  clopidogrel Tablet 75 milliGRAM(s) Oral daily  cyanocobalamin 500 MICROGram(s) Oral daily  finasteride 5 milliGRAM(s) Oral daily  folic acid 1 milliGRAM(s) Oral daily  losartan 50 milliGRAM(s) Oral daily  metoprolol     tartrate 12.5 milliGRAM(s) Oral every 12 hours  montelukast 10 milliGRAM(s) Oral daily  nitroglycerin     SubLingual 0.4 milliGRAM(s) SubLingual every 5 minutes PRN  phenytoin   Capsule 200 milliGRAM(s) Oral two times a day  predniSONE   Tablet 5 milliGRAM(s) Oral daily  pyridoxine 100 milliGRAM(s) Oral daily  simvastatin 40 milliGRAM(s) Oral at bedtime      Review of Systems:   No c/o chest pain or SOB, and all others negative.    Vitals:  T(C): 36.3 (17 @ 05:17), Max: 36.6 (17 @ 13:55)  HR: 81 (17 @ 05:17) (70 - 81)  BP: 118/73 (17 @ 05:17) (102/51 - 155/73)  BP(mean): --  RR: 17 (17 @ 05:17) (16 - 19)  SpO2: 98% (17 @ 05:17) (96% - 100%)  Wt(kg): --  Daily     Daily   I&O's Summary    15 Nov 2017 07:01  -  2017 07:00  --------------------------------------------------------  IN: 470 mL / OUT: 700 mL / NET: -230 mL    2017 07:01  -  2017 05:34  --------------------------------------------------------  IN: 520 mL / OUT: 375 mL / NET: 145 mL      Physical Exam:  Appearance: Pt in NAD, non-toxic  Cardiovascular: S1 S2  Cath Site: No evidence of bleeding or hematoma, Non-tender to palpation, 2+ distal pulses  Respiratory: Clear to auscultation bilaterally  Gastrointestinal: Soft, NT/ND, Bowel Sounds +  Neurologic: Non-focal                          9.8    5.8   )-----------( 212      ( 2017 01:12 )             29.2     -    138  |  104  |  15  ----------------------------<  76  4.1   |  21<L>  |  0.51    Ca    9.4      2017 01:11    TPro  6.6  /  Alb  2.7<L>  /  TBili  0.2  /  DBili  0.1  /  AST  52<H>  /  ALT  53<H>  /  AlkPhos  200<H>  11-16            Lipid panel Total Cholesterol: 160  LDL: 78  HDL: 62  T      Hgb A1c     ECG: SR at HR 80 with LBBB and RBBB, no significant changes from previous EKG.    Interpretation of Telemetry: SR at HR 70-80's.  No special event over night.    Assessment/Plan:   S/P staged PCI: ROJAS x1 to D1 via R groin. Pt tolerated procedure well and overnight remained uneventful. No c/o chest pain or SOB. Pt is hemodynamically stable, EKG and all lab results reviewed. Insertion/incision site benign, no bleeding or hematoma, and cath site dressing changed. Discharge teaching provided to Pt/caretaker and verbalized understanding the instruction. Pt is stable for discharge home as per attending.   F/U with cardiologist in 1-2 weeks.  Plan to d/c home in Am if stable.

## 2017-11-21 ENCOUNTER — APPOINTMENT (OUTPATIENT)
Dept: PULMONOLOGY | Facility: CLINIC | Age: 82
End: 2017-11-21
Payer: MEDICARE

## 2017-11-21 VITALS
DIASTOLIC BLOOD PRESSURE: 60 MMHG | WEIGHT: 131 LBS | SYSTOLIC BLOOD PRESSURE: 95 MMHG | BODY MASS INDEX: 23.21 KG/M2 | HEIGHT: 63 IN

## 2017-11-21 VITALS — HEART RATE: 67 BPM | OXYGEN SATURATION: 95 %

## 2017-11-21 PROCEDURE — 99214 OFFICE O/P EST MOD 30 MIN: CPT | Mod: 25

## 2017-11-21 PROCEDURE — 71020: CPT

## 2017-11-21 RX ORDER — CLOPIDOGREL BISULFATE 75 MG/1
75 TABLET, FILM COATED ORAL
Qty: 90 | Refills: 0 | Status: ACTIVE | COMMUNITY
Start: 2017-06-30

## 2017-11-30 ENCOUNTER — EMERGENCY (EMERGENCY)
Facility: HOSPITAL | Age: 82
LOS: 1 days | Discharge: HOME | End: 2017-11-30
Admitting: EMERGENCY MEDICINE
Payer: MEDICARE

## 2017-11-30 DIAGNOSIS — Z79.899 OTHER LONG TERM (CURRENT) DRUG THERAPY: ICD-10-CM

## 2017-11-30 DIAGNOSIS — Z98.890 OTHER SPECIFIED POSTPROCEDURAL STATES: Chronic | ICD-10-CM

## 2017-11-30 DIAGNOSIS — K91.89 OTHER POSTPROCEDURAL COMPLICATIONS AND DISORDERS OF DIGESTIVE SYSTEM: Chronic | ICD-10-CM

## 2017-11-30 DIAGNOSIS — J45.909 UNSPECIFIED ASTHMA, UNCOMPLICATED: ICD-10-CM

## 2017-11-30 DIAGNOSIS — Z90.49 ACQUIRED ABSENCE OF OTHER SPECIFIED PARTS OF DIGESTIVE TRACT: ICD-10-CM

## 2017-11-30 DIAGNOSIS — Z90.89 ACQUIRED ABSENCE OF OTHER ORGANS: Chronic | ICD-10-CM

## 2017-11-30 DIAGNOSIS — Z86.73 PERSONAL HISTORY OF TRANSIENT ISCHEMIC ATTACK (TIA), AND CEREBRAL INFARCTION WITHOUT RESIDUAL DEFICITS: ICD-10-CM

## 2017-11-30 DIAGNOSIS — H92.02 OTALGIA, LEFT EAR: ICD-10-CM

## 2017-11-30 DIAGNOSIS — Z96.649 PRESENCE OF UNSPECIFIED ARTIFICIAL HIP JOINT: Chronic | ICD-10-CM

## 2017-11-30 DIAGNOSIS — I10 ESSENTIAL (PRIMARY) HYPERTENSION: ICD-10-CM

## 2017-11-30 DIAGNOSIS — Z96.642 PRESENCE OF LEFT ARTIFICIAL HIP JOINT: ICD-10-CM

## 2017-11-30 DIAGNOSIS — N40.0 BENIGN PROSTATIC HYPERPLASIA WITHOUT LOWER URINARY TRACT SYMPTOMS: ICD-10-CM

## 2017-11-30 DIAGNOSIS — E78.00 PURE HYPERCHOLESTEROLEMIA, UNSPECIFIED: ICD-10-CM

## 2017-11-30 DIAGNOSIS — M12.9 ARTHROPATHY, UNSPECIFIED: Chronic | ICD-10-CM

## 2017-11-30 DIAGNOSIS — Z88.0 ALLERGY STATUS TO PENICILLIN: ICD-10-CM

## 2017-11-30 DIAGNOSIS — Z95.5 PRESENCE OF CORONARY ANGIOPLASTY IMPLANT AND GRAFT: Chronic | ICD-10-CM

## 2017-11-30 DIAGNOSIS — R56.9 UNSPECIFIED CONVULSIONS: ICD-10-CM

## 2017-11-30 PROCEDURE — 99282 EMERGENCY DEPT VISIT SF MDM: CPT

## 2017-12-05 RX ORDER — TIOTROPIUM BROMIDE INHALATION SPRAY 3.12 UG/1
2.5 SPRAY, METERED RESPIRATORY (INHALATION) DAILY
Qty: 1 | Refills: 3 | Status: ACTIVE | COMMUNITY
Start: 2017-12-05 | End: 1900-01-01

## 2017-12-06 ENCOUNTER — FORM ENCOUNTER (OUTPATIENT)
Age: 82
End: 2017-12-06

## 2017-12-07 ENCOUNTER — APPOINTMENT (OUTPATIENT)
Dept: CT IMAGING | Facility: IMAGING CENTER | Age: 82
End: 2017-12-07
Payer: MEDICARE

## 2017-12-07 ENCOUNTER — OUTPATIENT (OUTPATIENT)
Dept: OUTPATIENT SERVICES | Facility: HOSPITAL | Age: 82
LOS: 1 days | End: 2017-12-07
Payer: MEDICARE

## 2017-12-07 DIAGNOSIS — Z96.649 PRESENCE OF UNSPECIFIED ARTIFICIAL HIP JOINT: Chronic | ICD-10-CM

## 2017-12-07 DIAGNOSIS — M12.9 ARTHROPATHY, UNSPECIFIED: Chronic | ICD-10-CM

## 2017-12-07 DIAGNOSIS — K91.89 OTHER POSTPROCEDURAL COMPLICATIONS AND DISORDERS OF DIGESTIVE SYSTEM: Chronic | ICD-10-CM

## 2017-12-07 DIAGNOSIS — Z95.5 PRESENCE OF CORONARY ANGIOPLASTY IMPLANT AND GRAFT: Chronic | ICD-10-CM

## 2017-12-07 DIAGNOSIS — Z90.89 ACQUIRED ABSENCE OF OTHER ORGANS: Chronic | ICD-10-CM

## 2017-12-07 DIAGNOSIS — Z98.890 OTHER SPECIFIED POSTPROCEDURAL STATES: Chronic | ICD-10-CM

## 2017-12-07 DIAGNOSIS — J39.8 OTHER SPECIFIED DISEASES OF UPPER RESPIRATORY TRACT: ICD-10-CM

## 2017-12-07 PROCEDURE — 71250 CT THORAX DX C-: CPT

## 2017-12-07 PROCEDURE — 71250 CT THORAX DX C-: CPT | Mod: 26

## 2017-12-12 ENCOUNTER — OTHER (OUTPATIENT)
Age: 82
End: 2017-12-12

## 2017-12-19 PROCEDURE — 85730 THROMBOPLASTIN TIME PARTIAL: CPT

## 2017-12-19 PROCEDURE — 96374 THER/PROPH/DIAG INJ IV PUSH: CPT

## 2017-12-19 PROCEDURE — 85027 COMPLETE CBC AUTOMATED: CPT

## 2017-12-19 PROCEDURE — 82607 VITAMIN B-12: CPT

## 2017-12-19 PROCEDURE — 83690 ASSAY OF LIPASE: CPT

## 2017-12-19 PROCEDURE — 99285 EMERGENCY DEPT VISIT HI MDM: CPT | Mod: 25

## 2017-12-19 PROCEDURE — 71260 CT THORAX DX C+: CPT

## 2017-12-19 PROCEDURE — 82550 ASSAY OF CK (CPK): CPT

## 2017-12-19 PROCEDURE — 84484 ASSAY OF TROPONIN QUANT: CPT

## 2017-12-19 PROCEDURE — 93005 ELECTROCARDIOGRAM TRACING: CPT

## 2017-12-19 PROCEDURE — 83735 ASSAY OF MAGNESIUM: CPT

## 2017-12-19 PROCEDURE — 92921: CPT | Mod: LD

## 2017-12-19 PROCEDURE — 36415 COLL VENOUS BLD VENIPUNCTURE: CPT

## 2017-12-19 PROCEDURE — 80048 BASIC METABOLIC PNL TOTAL CA: CPT

## 2017-12-19 PROCEDURE — 71045 X-RAY EXAM CHEST 1 VIEW: CPT

## 2017-12-19 PROCEDURE — 82728 ASSAY OF FERRITIN: CPT

## 2017-12-19 PROCEDURE — C1769: CPT

## 2017-12-19 PROCEDURE — 85610 PROTHROMBIN TIME: CPT

## 2017-12-19 PROCEDURE — 82553 CREATINE MB FRACTION: CPT

## 2017-12-19 PROCEDURE — 93458 L HRT ARTERY/VENTRICLE ANGIO: CPT | Mod: 59

## 2017-12-19 PROCEDURE — 94640 AIRWAY INHALATION TREATMENT: CPT

## 2017-12-19 PROCEDURE — 84443 ASSAY THYROID STIM HORMONE: CPT

## 2017-12-19 PROCEDURE — 80053 COMPREHEN METABOLIC PANEL: CPT

## 2017-12-19 PROCEDURE — C1894: CPT

## 2017-12-19 PROCEDURE — 80061 LIPID PANEL: CPT

## 2017-12-19 PROCEDURE — C1725: CPT

## 2017-12-19 PROCEDURE — 80076 HEPATIC FUNCTION PANEL: CPT

## 2017-12-19 PROCEDURE — 80185 ASSAY OF PHENYTOIN TOTAL: CPT

## 2017-12-19 PROCEDURE — 92928 PRQ TCAT PLMT NTRAC ST 1 LES: CPT | Mod: RC

## 2017-12-19 PROCEDURE — 96372 THER/PROPH/DIAG INJ SC/IM: CPT | Mod: XU

## 2017-12-19 PROCEDURE — C1887: CPT

## 2017-12-19 PROCEDURE — 93306 TTE W/DOPPLER COMPLETE: CPT

## 2017-12-19 PROCEDURE — 99152 MOD SED SAME PHYS/QHP 5/>YRS: CPT

## 2017-12-19 PROCEDURE — 83880 ASSAY OF NATRIURETIC PEPTIDE: CPT

## 2017-12-19 PROCEDURE — 82150 ASSAY OF AMYLASE: CPT

## 2017-12-19 PROCEDURE — 74177 CT ABD & PELVIS W/CONTRAST: CPT

## 2017-12-19 PROCEDURE — 83550 IRON BINDING TEST: CPT

## 2017-12-19 PROCEDURE — 96375 TX/PRO/DX INJ NEW DRUG ADDON: CPT

## 2017-12-19 PROCEDURE — C1874: CPT

## 2017-12-19 PROCEDURE — 82746 ASSAY OF FOLIC ACID SERUM: CPT

## 2017-12-27 ENCOUNTER — MEDICATION RENEWAL (OUTPATIENT)
Age: 82
End: 2017-12-27

## 2018-01-14 ENCOUNTER — RX RENEWAL (OUTPATIENT)
Age: 83
End: 2018-01-14

## 2018-01-22 ENCOUNTER — FORM ENCOUNTER (OUTPATIENT)
Age: 83
End: 2018-01-22

## 2018-01-23 ENCOUNTER — APPOINTMENT (OUTPATIENT)
Dept: RADIOLOGY | Facility: HOSPITAL | Age: 83
End: 2018-01-23

## 2018-01-23 ENCOUNTER — APPOINTMENT (OUTPATIENT)
Dept: THORACIC SURGERY | Facility: CLINIC | Age: 83
End: 2018-01-23
Payer: MEDICARE

## 2018-01-23 ENCOUNTER — OUTPATIENT (OUTPATIENT)
Dept: OUTPATIENT SERVICES | Facility: HOSPITAL | Age: 83
LOS: 1 days | End: 2018-01-23
Payer: MEDICARE

## 2018-01-23 VITALS
WEIGHT: 127 LBS | HEIGHT: 63 IN | OXYGEN SATURATION: 95 % | HEART RATE: 100 BPM | SYSTOLIC BLOOD PRESSURE: 111 MMHG | BODY MASS INDEX: 22.5 KG/M2 | RESPIRATION RATE: 16 BRPM | DIASTOLIC BLOOD PRESSURE: 62 MMHG

## 2018-01-23 DIAGNOSIS — M12.9 ARTHROPATHY, UNSPECIFIED: Chronic | ICD-10-CM

## 2018-01-23 DIAGNOSIS — C15.5 MALIGNANT NEOPLASM OF LOWER THIRD OF ESOPHAGUS: ICD-10-CM

## 2018-01-23 DIAGNOSIS — Z95.5 PRESENCE OF CORONARY ANGIOPLASTY IMPLANT AND GRAFT: Chronic | ICD-10-CM

## 2018-01-23 DIAGNOSIS — Z98.890 OTHER SPECIFIED POSTPROCEDURAL STATES: Chronic | ICD-10-CM

## 2018-01-23 DIAGNOSIS — Z96.649 PRESENCE OF UNSPECIFIED ARTIFICIAL HIP JOINT: Chronic | ICD-10-CM

## 2018-01-23 DIAGNOSIS — K91.89 OTHER POSTPROCEDURAL COMPLICATIONS AND DISORDERS OF DIGESTIVE SYSTEM: Chronic | ICD-10-CM

## 2018-01-23 DIAGNOSIS — Z90.89 ACQUIRED ABSENCE OF OTHER ORGANS: Chronic | ICD-10-CM

## 2018-01-23 PROCEDURE — 99213 OFFICE O/P EST LOW 20 MIN: CPT

## 2018-01-23 PROCEDURE — 71046 X-RAY EXAM CHEST 2 VIEWS: CPT | Mod: 26

## 2018-01-31 ENCOUNTER — APPOINTMENT (OUTPATIENT)
Dept: PULMONOLOGY | Facility: CLINIC | Age: 83
End: 2018-01-31
Payer: MEDICARE

## 2018-01-31 PROCEDURE — G0424: CPT

## 2018-02-02 ENCOUNTER — APPOINTMENT (OUTPATIENT)
Dept: PULMONOLOGY | Facility: CLINIC | Age: 83
End: 2018-02-02
Payer: MEDICARE

## 2018-02-02 PROCEDURE — G0424: CPT

## 2018-02-05 ENCOUNTER — APPOINTMENT (OUTPATIENT)
Dept: PULMONOLOGY | Facility: CLINIC | Age: 83
End: 2018-02-05
Payer: MEDICARE

## 2018-02-05 PROCEDURE — G0424: CPT

## 2018-02-07 ENCOUNTER — APPOINTMENT (OUTPATIENT)
Dept: PULMONOLOGY | Facility: CLINIC | Age: 83
End: 2018-02-07
Payer: MEDICARE

## 2018-02-07 PROCEDURE — G0424: CPT

## 2018-02-08 ENCOUNTER — INPATIENT (INPATIENT)
Facility: HOSPITAL | Age: 83
LOS: 0 days | Discharge: HOME | End: 2018-02-09
Attending: THORACIC SURGERY (CARDIOTHORACIC VASCULAR SURGERY) | Admitting: THORACIC SURGERY (CARDIOTHORACIC VASCULAR SURGERY)
Payer: MEDICARE

## 2018-02-08 ENCOUNTER — APPOINTMENT (OUTPATIENT)
Dept: THORACIC SURGERY | Facility: HOSPITAL | Age: 83
End: 2018-02-08

## 2018-02-08 ENCOUNTER — TRANSCRIPTION ENCOUNTER (OUTPATIENT)
Age: 83
End: 2018-02-08

## 2018-02-08 VITALS
RESPIRATION RATE: 20 BRPM | HEIGHT: 63 IN | HEART RATE: 82 BPM | TEMPERATURE: 98 F | SYSTOLIC BLOOD PRESSURE: 131 MMHG | DIASTOLIC BLOOD PRESSURE: 57 MMHG | OXYGEN SATURATION: 100 % | WEIGHT: 126.99 LBS

## 2018-02-08 DIAGNOSIS — Z96.649 PRESENCE OF UNSPECIFIED ARTIFICIAL HIP JOINT: Chronic | ICD-10-CM

## 2018-02-08 DIAGNOSIS — M12.9 ARTHROPATHY, UNSPECIFIED: Chronic | ICD-10-CM

## 2018-02-08 DIAGNOSIS — K91.89 OTHER POSTPROCEDURAL COMPLICATIONS AND DISORDERS OF DIGESTIVE SYSTEM: Chronic | ICD-10-CM

## 2018-02-08 DIAGNOSIS — Z95.5 PRESENCE OF CORONARY ANGIOPLASTY IMPLANT AND GRAFT: Chronic | ICD-10-CM

## 2018-02-08 DIAGNOSIS — Z98.890 OTHER SPECIFIED POSTPROCEDURAL STATES: Chronic | ICD-10-CM

## 2018-02-08 DIAGNOSIS — Z90.89 ACQUIRED ABSENCE OF OTHER ORGANS: Chronic | ICD-10-CM

## 2018-02-08 DIAGNOSIS — C15.5 MALIGNANT NEOPLASM OF LOWER THIRD OF ESOPHAGUS: ICD-10-CM

## 2018-02-08 LAB
HCT VFR BLD CALC: 26.3 % — LOW (ref 39–50)
HGB BLD-MCNC: 8.5 G/DL — LOW (ref 13–17)
MCHC RBC-ENTMCNC: 31.3 PG — SIGNIFICANT CHANGE UP (ref 27–34)
MCHC RBC-ENTMCNC: 32.3 % — SIGNIFICANT CHANGE UP (ref 32–36)
MCV RBC AUTO: 96.7 FL — SIGNIFICANT CHANGE UP (ref 80–100)
NRBC # FLD: 0 — SIGNIFICANT CHANGE UP
PLATELET # BLD AUTO: 189 K/UL — SIGNIFICANT CHANGE UP (ref 150–400)
PMV BLD: 8.9 FL — SIGNIFICANT CHANGE UP (ref 7–13)
RBC # BLD: 2.72 M/UL — LOW (ref 4.2–5.8)
RBC # FLD: 14.6 % — HIGH (ref 10.3–14.5)
WBC # BLD: 5.24 K/UL — SIGNIFICANT CHANGE UP (ref 3.8–10.5)
WBC # FLD AUTO: 5.24 K/UL — SIGNIFICANT CHANGE UP (ref 3.8–10.5)

## 2018-02-08 PROCEDURE — 43248 EGD GUIDE WIRE INSERTION: CPT

## 2018-02-08 PROCEDURE — 43236 UPPR GI SCOPE W/SUBMUC INJ: CPT | Mod: 59

## 2018-02-08 RX ORDER — PREGABALIN 225 MG/1
500 CAPSULE ORAL DAILY
Qty: 0 | Refills: 0 | Status: DISCONTINUED | OUTPATIENT
Start: 2018-02-08 | End: 2018-02-09

## 2018-02-08 RX ORDER — ALBUTEROL 90 UG/1
2 AEROSOL, METERED ORAL EVERY 6 HOURS
Qty: 0 | Refills: 0 | Status: DISCONTINUED | OUTPATIENT
Start: 2018-02-08 | End: 2018-02-09

## 2018-02-08 RX ORDER — BUDESONIDE, MICRONIZED 100 %
0.5 POWDER (GRAM) MISCELLANEOUS EVERY 12 HOURS
Qty: 0 | Refills: 0 | Status: DISCONTINUED | OUTPATIENT
Start: 2018-02-08 | End: 2018-02-09

## 2018-02-08 RX ORDER — AMLODIPINE BESYLATE 2.5 MG/1
5 TABLET ORAL DAILY
Qty: 0 | Refills: 0 | Status: DISCONTINUED | OUTPATIENT
Start: 2018-02-08 | End: 2018-02-09

## 2018-02-08 RX ORDER — FOLIC ACID 0.8 MG
1 TABLET ORAL DAILY
Qty: 0 | Refills: 0 | Status: DISCONTINUED | OUTPATIENT
Start: 2018-02-08 | End: 2018-02-09

## 2018-02-08 RX ORDER — LOSARTAN POTASSIUM 100 MG/1
50 TABLET, FILM COATED ORAL DAILY
Qty: 0 | Refills: 0 | Status: DISCONTINUED | OUTPATIENT
Start: 2018-02-08 | End: 2018-02-09

## 2018-02-08 RX ORDER — METOPROLOL TARTRATE 50 MG
12.5 TABLET ORAL
Qty: 0 | Refills: 0 | Status: DISCONTINUED | OUTPATIENT
Start: 2018-02-08 | End: 2018-02-09

## 2018-02-08 RX ORDER — PYRIDOXINE HCL (VITAMIN B6) 100 MG
100 TABLET ORAL DAILY
Qty: 0 | Refills: 0 | Status: DISCONTINUED | OUTPATIENT
Start: 2018-02-08 | End: 2018-02-09

## 2018-02-08 RX ORDER — DOCUSATE SODIUM 100 MG
100 CAPSULE ORAL THREE TIMES A DAY
Qty: 0 | Refills: 0 | Status: DISCONTINUED | OUTPATIENT
Start: 2018-02-08 | End: 2018-02-09

## 2018-02-08 RX ORDER — SIMVASTATIN 20 MG/1
40 TABLET, FILM COATED ORAL AT BEDTIME
Qty: 0 | Refills: 0 | Status: DISCONTINUED | OUTPATIENT
Start: 2018-02-08 | End: 2018-02-09

## 2018-02-08 RX ORDER — FINASTERIDE 5 MG/1
5 TABLET, FILM COATED ORAL DAILY
Qty: 0 | Refills: 0 | Status: DISCONTINUED | OUTPATIENT
Start: 2018-02-08 | End: 2018-02-09

## 2018-02-08 RX ORDER — MONTELUKAST 4 MG/1
10 TABLET, CHEWABLE ORAL DAILY
Qty: 0 | Refills: 0 | Status: DISCONTINUED | OUTPATIENT
Start: 2018-02-08 | End: 2018-02-09

## 2018-02-08 RX ADMIN — Medication 0.5 MILLIGRAM(S): at 23:01

## 2018-02-08 RX ADMIN — SIMVASTATIN 40 MILLIGRAM(S): 20 TABLET, FILM COATED ORAL at 21:16

## 2018-02-08 RX ADMIN — Medication 12.5 MILLIGRAM(S): at 21:16

## 2018-02-08 RX ADMIN — Medication 200 MILLIGRAM(S): at 21:16

## 2018-02-08 NOTE — BRIEF OPERATIVE NOTE - PROCEDURE
<<-----Click on this checkbox to enter Procedure Endoscopy  02/08/2018  Endoscopy w/ Savory Dilation 48  Active  [facility]

## 2018-02-08 NOTE — H&P ADULT - HISTORY OF PRESENT ILLNESS
81 y/o M, smoker, w/ hx of HTN and TIA in 2000's w/ no residuals but developed Seizures (on medication), CAD s/p stents on 6/2017, and lower third Esophageal CA.    EGD Bx on 3/31/17 Path revealed esophagus at 35cm intramucosal AdenoCA.  EUS early April showed esophagus mass at 35 cm from the incisors and extended to 30 cm; invasion into the submucosa (Layer 3). EUS Stg [id number] (?T3, but clean borders).    Pt had completed neoadj Chemo (5 cycles) and XRT (25 sessions) end of May.   Repeat EGD Bx "was negative for malignancy". Suggesting complete response.  Restaging PET/CT on 7/11/17 showed resolution of FDG avidity in the distal esophagus.    Now 5mo S/P Rt VATS, Robotic-assisted, MLND, wedge rxn of RLL,EGD, LAP, [place] esophagogastrectomy, feeding tube placement on 8/14/17(Joint case with Dr. Ganesh Dietz).  Path revealed AdenoCA involving the GE junction, 0.3cm, G2, tumor invades lamina propria, LNs (0/13) negative, margins negative, [id number], single cells or rare small groups of cancer cells (near complete response, score 1). HER2 negative. RLL wedge negative malignancy.    Post-op complicated by a leak confirmed w/ CT chest, GINA drain placed. Pt was kept NPO, on J-tube feeding and discharged to rehab. Reported one episode of black emesis on 9/4/17 at Rehab, +FOBT, Hbg dropped from 8.4 to 7.8.  Pt was readmitted to [facility] on 9/20/17 w/ a clogged J-tube and Lt Pleural effusion s/p drainage, but developed respiratory distress and was intubated, then transferred to [facility], CT chest showed phlegmon in the mediastinum. Bedside EGD on 9/22 showed a leak around anastomosis, GINA drain intact; an area of defect in stomach, entered through the defect into a modest size cavity.Pt was extubated and discharged with GINA drain and on J-tube feeding on 10/4/17. Started PO liquid slowly on 10/10/17, pt tolerated well.    Pt underwent an EGD w/ Savory dilation for sticture. Afterwards, so bleeding was noted at stricture site which required Epi injection which the bleeding resolved. Pt admitted for observation      PAST MEDICAL & SURGICAL HISTORY:  Coronary artery disease: s/p 3 stents on June 30, 2017 at [place] Dr. Lavelle Reid  OA (osteoarthritis) of knee: right  Former smoker, stopped smoking in distant past  Rheumatoid arthritis  Seizure disorder: 1 episode approximately 15 yrs ago  Asthma  Hyperlipidemia  BPH (benign prostatic hyperplasia)  HTN (hypertension)  Esophagus cancer  Chronic allergic rhinitis  BCC (basal cell carcinoma): skin  Cerebrovascular accident (CVA)  Anxiety  Anastomotic leak following esophagectomy: Vance trent esophagectomy  History of tonsillectomy  H/O heart artery stent: June 30, 2017  Knee arthropathy: left  History of total hip replacement: left  History of hernia repair      REVIEW OF SYSTEMS      General:No Weight change/ Fatigue/ HA/Dizzy	    Skin/Breast: No Rashes/ Lesions/ Masses  	  Ophthalmologic: No Blurry vision/ Glaucoma/ Blindness  	  ENMT: No Hearing loss/ Drainage/ Lesions	    Respiratory and Thorax: No Cough/ Wheezing/ SOB/ Hemoptysis/ Sputum production  	  Cardiovascular: No Chest pain/ Palpitations/ Diaphoresis	    Gastrointestinal: + Dysphagia    No Nausea/ Vomiting/ Constipation/ Appetite Change	    Genitourinary: No Heamturia/ Dysuria/ Frequency change/ Impotence	    Musculoskeletal: No Pain/ Weakness/ Claudication	    Neurological: No Seizures/ TIA/CVA/ Parastesias	    Psychiatric: No Dementia/ Depression/ SI/HI	    Hematology/Lymphatics: No hx of bleeding/ Edema	    Endocrine:	No Hyperglycemia/ Hypoglycemia    Allergic/Immunologic:	 No Anaphylaxis/ Intolerance/ Recent illnesses    MEDICATIONS  (STANDING):  amLODIPine   Tablet 5 milliGRAM(s) Oral daily  buDESOnide   0.5 milliGRAM(s) Respule 0.5 milliGRAM(s) Nebulizer every 12 hours  cyanocobalamin 500 MICROGram(s) Oral daily  finasteride 5 milliGRAM(s) Oral daily  folic acid 1 milliGRAM(s) Oral daily  losartan 50 milliGRAM(s) Oral daily  montelukast 10 milliGRAM(s) Oral daily  phenytoin   Capsule 100 milliGRAM(s) Oral two times a day  predniSONE   Tablet 5 milliGRAM(s) Oral daily  pyridoxine 100 milliGRAM(s) Oral daily  simvastatin 40 milliGRAM(s) Oral at bedtime    MEDICATIONS  (PRN):  ALBUTerol    90 MICROgram(s) HFA Inhaler 2 Puff(s) Inhalation every 6 hours PRN Shortness of Breath and/or Wheezing      Allergies    penicillin (Rash)    Intolerances        SOCIAL HISTORY:  Occupation:  Smoking Hx: denies  Etoh Hx: denies  IVDA Hx: denies    FAMILY HISTORY:  Family history of heart attack (Sibling)  Family history of pulmonary embolism: father @ 49 yr old  FH: CAD (coronary artery disease)    unless noted, no significant family hx with Mother, Father, Siblings    Vital Signs Last 24 Hrs  T(C): --  T(F): --  HR: --84  BP: --132/76  BP(mean): --  RR: --19  SpO2: -100RA-    General: WN/WD NAD  Neurology: Awake, nonfocal, MAI x 4  Eyes: Scleras clear, PERRLA/ EOMI, Gross vision intact  ENT:Gross hearing intact, grossly patent pharynx, no stridor  Neck: Neck supple, trachea midline, No JVD,   Respiratory: CTA B/L, No wheezing, rales, rhonchi  CV: RRR, S1S2, no murmurs, rubs or gallops  Abdominal: Soft, NT, ND +BS,   Extremities: No edema, + peripheral pulses  Skin: No Rashes, Hematoma, Ecchymosis  Lymphatic: No Neck, axilla, groin LAD  Psych: Oriented x 3, normal affect      LABS:                RADIOLOGY & ADDITIONAL STUDIES:    ASSESSMENT:   82yMalePAST MEDICAL & SURGICAL HISTORY:  Coronary artery disease: s/p 3 stents on June 30, 2017 at [place] Dr. Lavelle Reid  OA (osteoarthritis) of knee: right  Former smoker, stopped smoking in distant past  Rheumatoid arthritis  Seizure disorder: 1 episode approximately 15 yrs ago  Asthma  Hyperlipidemia  BPH (benign prostatic hyperplasia)  HTN (hypertension)  Esophagus cancer  Chronic allergic rhinitis  BCC (basal cell carcinoma): skin  Cerebrovascular accident (CVA)  Anxiety  Anastomotic leak following esophagectomy: Vance trent esophagectomy  History of tonsillectomy  H/O heart artery stent: June 30, 2017  Knee arthropathy: left  History of total hip replacement: left  History of hernia repair  Esophageal stricture s/p EGD w/ savory dilation      PLAN: Admit for observation overnight            Hold Plavix/Aspirin for 3 days            Clears for 24 hours  Plan d/w Dr. Weinstein

## 2018-02-09 ENCOUNTER — APPOINTMENT (OUTPATIENT)
Dept: PULMONOLOGY | Facility: CLINIC | Age: 83
End: 2018-02-09

## 2018-02-09 VITALS
OXYGEN SATURATION: 99 % | SYSTOLIC BLOOD PRESSURE: 137 MMHG | DIASTOLIC BLOOD PRESSURE: 69 MMHG | HEART RATE: 77 BPM | RESPIRATION RATE: 20 BRPM | TEMPERATURE: 98 F

## 2018-02-09 LAB
BASOPHILS # BLD AUTO: 0.03 K/UL — SIGNIFICANT CHANGE UP (ref 0–0.2)
BASOPHILS NFR BLD AUTO: 0.8 % — SIGNIFICANT CHANGE UP (ref 0–2)
BLD GP AB SCN SERPL QL: NEGATIVE — SIGNIFICANT CHANGE UP
EOSINOPHIL # BLD AUTO: 0.18 K/UL — SIGNIFICANT CHANGE UP (ref 0–0.5)
EOSINOPHIL NFR BLD AUTO: 4.5 % — SIGNIFICANT CHANGE UP (ref 0–6)
HCT VFR BLD CALC: 24.2 % — LOW (ref 39–50)
HCT VFR BLD CALC: 31.8 % — LOW (ref 39–50)
HGB BLD-MCNC: 10.1 G/DL — LOW (ref 13–17)
HGB BLD-MCNC: 8 G/DL — LOW (ref 13–17)
IMM GRANULOCYTES # BLD AUTO: 0.01 # — SIGNIFICANT CHANGE UP
IMM GRANULOCYTES NFR BLD AUTO: 0.3 % — SIGNIFICANT CHANGE UP (ref 0–1.5)
LYMPHOCYTES # BLD AUTO: 0.68 K/UL — LOW (ref 1–3.3)
LYMPHOCYTES # BLD AUTO: 17.2 % — SIGNIFICANT CHANGE UP (ref 13–44)
MCHC RBC-ENTMCNC: 30 PG — SIGNIFICANT CHANGE UP (ref 27–34)
MCHC RBC-ENTMCNC: 31.8 % — LOW (ref 32–36)
MCHC RBC-ENTMCNC: 32.1 PG — SIGNIFICANT CHANGE UP (ref 27–34)
MCHC RBC-ENTMCNC: 33.1 % — SIGNIFICANT CHANGE UP (ref 32–36)
MCV RBC AUTO: 94.4 FL — SIGNIFICANT CHANGE UP (ref 80–100)
MCV RBC AUTO: 97.2 FL — SIGNIFICANT CHANGE UP (ref 80–100)
MONOCYTES # BLD AUTO: 0.45 K/UL — SIGNIFICANT CHANGE UP (ref 0–0.9)
MONOCYTES NFR BLD AUTO: 11.4 % — SIGNIFICANT CHANGE UP (ref 2–14)
NEUTROPHILS # BLD AUTO: 2.61 K/UL — SIGNIFICANT CHANGE UP (ref 1.8–7.4)
NEUTROPHILS NFR BLD AUTO: 65.8 % — SIGNIFICANT CHANGE UP (ref 43–77)
NRBC # FLD: 0 — SIGNIFICANT CHANGE UP
NRBC # FLD: 0 — SIGNIFICANT CHANGE UP
PLATELET # BLD AUTO: 192 K/UL — SIGNIFICANT CHANGE UP (ref 150–400)
PLATELET # BLD AUTO: 221 K/UL — SIGNIFICANT CHANGE UP (ref 150–400)
PMV BLD: 8.9 FL — SIGNIFICANT CHANGE UP (ref 7–13)
PMV BLD: 9 FL — SIGNIFICANT CHANGE UP (ref 7–13)
RBC # BLD: 2.49 M/UL — LOW (ref 4.2–5.8)
RBC # BLD: 3.37 M/UL — LOW (ref 4.2–5.8)
RBC # FLD: 14.4 % — SIGNIFICANT CHANGE UP (ref 10.3–14.5)
RBC # FLD: 14.9 % — HIGH (ref 10.3–14.5)
RH IG SCN BLD-IMP: POSITIVE — SIGNIFICANT CHANGE UP
WBC # BLD: 3.96 K/UL — SIGNIFICANT CHANGE UP (ref 3.8–10.5)
WBC # BLD: 5.74 K/UL — SIGNIFICANT CHANGE UP (ref 3.8–10.5)
WBC # FLD AUTO: 3.96 K/UL — SIGNIFICANT CHANGE UP (ref 3.8–10.5)
WBC # FLD AUTO: 5.74 K/UL — SIGNIFICANT CHANGE UP (ref 3.8–10.5)

## 2018-02-09 PROCEDURE — 71045 X-RAY EXAM CHEST 1 VIEW: CPT | Mod: 26

## 2018-02-09 RX ORDER — ASPIRIN/CALCIUM CARB/MAGNESIUM 324 MG
81 TABLET ORAL DAILY
Qty: 0 | Refills: 0 | Status: DISCONTINUED | OUTPATIENT
Start: 2018-02-09 | End: 2018-02-09

## 2018-02-09 RX ORDER — SIMVASTATIN 20 MG/1
20 TABLET, FILM COATED ORAL AT BEDTIME
Qty: 0 | Refills: 0 | Status: DISCONTINUED | OUTPATIENT
Start: 2018-02-09 | End: 2018-02-09

## 2018-02-09 RX ADMIN — Medication 200 MILLIGRAM(S): at 17:22

## 2018-02-09 RX ADMIN — Medication 1 MILLIGRAM(S): at 12:46

## 2018-02-09 RX ADMIN — MONTELUKAST 10 MILLIGRAM(S): 4 TABLET, CHEWABLE ORAL at 12:47

## 2018-02-09 RX ADMIN — Medication 12.5 MILLIGRAM(S): at 17:22

## 2018-02-09 RX ADMIN — Medication 100 MILLIGRAM(S): at 12:46

## 2018-02-09 RX ADMIN — PREGABALIN 500 MICROGRAM(S): 225 CAPSULE ORAL at 12:46

## 2018-02-09 NOTE — DISCHARGE NOTE ADULT - ADDITIONAL INSTRUCTIONS
Dr Weinstein in 2 weeks; Do not take the Aspirin and Plavix for 2 more days Dr Weinstein in 2 weeks, continue to hold plavix until 2/14/18

## 2018-02-09 NOTE — DISCHARGE NOTE ADULT - PATIENT PORTAL LINK FT
You can access the [facility] Patient Portal, offered by [facility], by registering with the following website: [email or web address]

## 2018-02-09 NOTE — DISCHARGE NOTE ADULT - CARE PLAN
Principal Discharge DX:	Esophagus cancer  Goal:	improvement after this dilation  Assessment and plan of treatment:	stable ; Follow up with Dr Weinstein

## 2018-02-09 NOTE — PROGRESS NOTE ADULT - SUBJECTIVE AND OBJECTIVE BOX
Pt was without complaints after his EGD dilation.  He had no c/o the bleeding that had been noted earlier    VSS    Abd: soft  Heart: S1, S2  Lungs CTA  Gen A&OX3    Labs: Hgb 8.5/ HCT 26.3    CXR small L pleural effusion    Stable s/p EGD dilation    Plan for repeat CBC 2/9; possible discharge home

## 2018-02-09 NOTE — DISCHARGE NOTE ADULT - CARE PROVIDER_API CALL
Christian Weinstein), Surgery; Thoracic Surgery  [address] [place] [address]  [place] [address]  Phone: [phone]  Fax: [phone]

## 2018-02-09 NOTE — DISCHARGE NOTE ADULT - MEDICATION SUMMARY - MEDICATIONS TO TAKE
I will START or STAY ON the medications listed below when I get home from the hospital:    finasteride 5 mg oral tablet  -- 1 tab(s) by mouth once a day in pm  home/hosp  -- Indication: For home med    predniSONE 5 mg oral tablet  -- 1 tab(s) by mouth once a day in am  home  -- Indication: For home med    Pulmicort Respules 0.5 mg/2 mL inhalation suspension  -- 2 milliliter(s) inhaled 2 times a day  home/hosp  -- Indication: For home med    aspirin 81 mg oral delayed release tablet  -- 1 tab(s) by mouth once a day  -- Indication: For home med    olmesartan 20 mg oral tablet  -- 1 tab(s) by mouth once a day in pm  home  -- Indication: For home med    Dilantin 100 mg oral capsule  -- 2 cap(s) by mouth 2 times a day  home/hosp  -- Indication: For home med    simvastatin 40 mg oral tablet  -- 1 tab(s) by mouth once a day in pm  home/hosp  -- Indication: For home med    Zetia 10 mg oral tablet  -- 1 tab(s) by mouth once a day in pm  home  -- Indication: For home med    clopidogrel 75 mg oral tablet  -- 1 tab(s) by mouth once a day  *** start on 2/14/18****  -- Indication: For home med *** resume 2/14/18***    ALPRAZolam 0.25 mg oral tablet  -- 2 tab(s) by mouth 4 times a day  home/hosp  -- Indication: For home med    metoprolol tartrate 25 mg oral tablet  -- 0.5 tab(s) by mouth 2 times a day MDD:1tab  -- Indication: For home med    ProAir HFA 90 mcg/inh inhalation aerosol  -- 2 puff(s) inhaled 4 times a day, As Needed   home/hosp  -- Indication: For home med    amLODIPine 5 mg oral tablet  -- 1 tab(s) by mouth once a day in pm  home/hosp  -- Indication: For home med    montelukast 10 mg oral tablet  -- 1 tab(s) by mouth once a day in pm  hosp/home  -- Indication: For home med    Vitamin B6 100 mg oral tablet  -- 1 tab(s) by mouth once a day  home/hosp  -- Indication: For home med    Vitamin B-12 500 mcg oral tablet  -- 1 tab(s) by mouth once a day  home/hosp  -- Indication: For home med    folic acid 1 mg oral tablet  -- 1 tab(s) by mouth once a day  home/hosp  -- Indication: For home med

## 2018-02-09 NOTE — DISCHARGE NOTE ADULT - NS AS ACTIVITY OBS
Walking-Indoors allowed/Sex allowed/Walking-Outdoors allowed/Stairs allowed/Return to Work/School allowed/Bathing allowed/Showering allowed/Driving allowed

## 2018-02-09 NOTE — DISCHARGE NOTE ADULT - HOSPITAL COURSE
Pt underwent an EGD dilation for post- Esophagectomy stricture. Some bleeding was noted and it was ascertained that the pt had not held his ASA and Plavix.  He was observed overnight but no further bleeding was noted. He was discharged with a stable hct. Pt underwent an EGD dilation for post- Esophagectomy stricture. Some bleeding was noted and it was ascertained that the pt had not held his ASA and Plavix.  He was observed overnight but no further bleeding was noted. Patient received 1 unit of red blood cells and repeat cbc stable.  He was discharged with a stable hct.

## 2018-02-12 ENCOUNTER — APPOINTMENT (OUTPATIENT)
Dept: PULMONOLOGY | Facility: CLINIC | Age: 83
End: 2018-02-12
Payer: MEDICARE

## 2018-02-12 ENCOUNTER — MEDICATION RENEWAL (OUTPATIENT)
Age: 83
End: 2018-02-12

## 2018-02-12 PROCEDURE — G0424: CPT

## 2018-02-14 ENCOUNTER — APPOINTMENT (OUTPATIENT)
Dept: PULMONOLOGY | Facility: CLINIC | Age: 83
End: 2018-02-14
Payer: MEDICARE

## 2018-02-14 PROCEDURE — G0424: CPT

## 2018-02-14 RX ORDER — CLOPIDOGREL BISULFATE 75 MG/1
1 TABLET, FILM COATED ORAL
Qty: 0 | Refills: 0 | COMMUNITY
Start: 2018-02-14

## 2018-02-16 ENCOUNTER — APPOINTMENT (OUTPATIENT)
Dept: PULMONOLOGY | Facility: CLINIC | Age: 83
End: 2018-02-16

## 2018-02-21 ENCOUNTER — APPOINTMENT (OUTPATIENT)
Dept: PULMONOLOGY | Facility: CLINIC | Age: 83
End: 2018-02-21
Payer: MEDICARE

## 2018-02-21 VITALS
SYSTOLIC BLOOD PRESSURE: 120 MMHG | HEIGHT: 63 IN | WEIGHT: 127 LBS | HEART RATE: 67 BPM | RESPIRATION RATE: 17 BRPM | BODY MASS INDEX: 22.5 KG/M2 | DIASTOLIC BLOOD PRESSURE: 70 MMHG | OXYGEN SATURATION: 98 %

## 2018-02-21 DIAGNOSIS — Z99.81 HYPOXEMIA: ICD-10-CM

## 2018-02-21 DIAGNOSIS — J41.1 MUCOPURULENT CHRONIC BRONCHITIS: ICD-10-CM

## 2018-02-21 DIAGNOSIS — J45.909 UNSPECIFIED ASTHMA, UNCOMPLICATED: ICD-10-CM

## 2018-02-21 DIAGNOSIS — R09.82 POSTNASAL DRIP: ICD-10-CM

## 2018-02-21 DIAGNOSIS — R09.02 HYPOXEMIA: ICD-10-CM

## 2018-02-21 PROCEDURE — 94010 BREATHING CAPACITY TEST: CPT | Mod: 59

## 2018-02-21 PROCEDURE — 94618 PULMONARY STRESS TESTING: CPT

## 2018-02-21 PROCEDURE — G0424: CPT

## 2018-02-21 PROCEDURE — 99214 OFFICE O/P EST MOD 30 MIN: CPT | Mod: 25

## 2018-02-23 ENCOUNTER — APPOINTMENT (OUTPATIENT)
Dept: PULMONOLOGY | Facility: CLINIC | Age: 83
End: 2018-02-23
Payer: MEDICARE

## 2018-02-23 PROCEDURE — G0424: CPT

## 2018-02-26 ENCOUNTER — APPOINTMENT (OUTPATIENT)
Dept: PULMONOLOGY | Facility: CLINIC | Age: 83
End: 2018-02-26
Payer: MEDICARE

## 2018-02-26 PROCEDURE — G0424: CPT

## 2018-02-28 ENCOUNTER — APPOINTMENT (OUTPATIENT)
Dept: PULMONOLOGY | Facility: CLINIC | Age: 83
End: 2018-02-28
Payer: MEDICARE

## 2018-02-28 PROCEDURE — G0424: CPT

## 2018-03-02 ENCOUNTER — APPOINTMENT (OUTPATIENT)
Dept: PULMONOLOGY | Facility: CLINIC | Age: 83
End: 2018-03-02

## 2018-03-05 ENCOUNTER — APPOINTMENT (OUTPATIENT)
Dept: PULMONOLOGY | Facility: CLINIC | Age: 83
End: 2018-03-05
Payer: MEDICARE

## 2018-03-05 PROCEDURE — G0424: CPT

## 2018-03-07 ENCOUNTER — APPOINTMENT (OUTPATIENT)
Dept: PULMONOLOGY | Facility: CLINIC | Age: 83
End: 2018-03-07

## 2018-03-09 ENCOUNTER — APPOINTMENT (OUTPATIENT)
Dept: PULMONOLOGY | Facility: CLINIC | Age: 83
End: 2018-03-09
Payer: MEDICARE

## 2018-03-09 PROCEDURE — G0424: CPT

## 2018-03-12 ENCOUNTER — APPOINTMENT (OUTPATIENT)
Dept: PULMONOLOGY | Facility: CLINIC | Age: 83
End: 2018-03-12
Payer: MEDICARE

## 2018-03-12 ENCOUNTER — MEDICATION RENEWAL (OUTPATIENT)
Age: 83
End: 2018-03-12

## 2018-03-12 PROCEDURE — G0424: CPT

## 2018-03-14 ENCOUNTER — APPOINTMENT (OUTPATIENT)
Dept: PULMONOLOGY | Facility: CLINIC | Age: 83
End: 2018-03-14
Payer: MEDICARE

## 2018-03-14 PROCEDURE — G0424: CPT

## 2018-03-16 ENCOUNTER — APPOINTMENT (OUTPATIENT)
Dept: PULMONOLOGY | Facility: CLINIC | Age: 83
End: 2018-03-16

## 2018-03-19 ENCOUNTER — APPOINTMENT (OUTPATIENT)
Dept: PULMONOLOGY | Facility: CLINIC | Age: 83
End: 2018-03-19
Payer: MEDICARE

## 2018-03-19 PROCEDURE — G0424: CPT

## 2018-03-21 ENCOUNTER — APPOINTMENT (OUTPATIENT)
Dept: PULMONOLOGY | Facility: CLINIC | Age: 83
End: 2018-03-21

## 2018-03-23 ENCOUNTER — APPOINTMENT (OUTPATIENT)
Dept: PULMONOLOGY | Facility: CLINIC | Age: 83
End: 2018-03-23
Payer: MEDICARE

## 2018-03-23 PROCEDURE — G0424: CPT

## 2018-03-26 ENCOUNTER — APPOINTMENT (OUTPATIENT)
Dept: PULMONOLOGY | Facility: CLINIC | Age: 83
End: 2018-03-26

## 2018-03-26 ENCOUNTER — APPOINTMENT (OUTPATIENT)
Dept: THORACIC SURGERY | Facility: CLINIC | Age: 83
End: 2018-03-26
Payer: MEDICARE

## 2018-03-26 VITALS
HEART RATE: 87 BPM | DIASTOLIC BLOOD PRESSURE: 78 MMHG | OXYGEN SATURATION: 98 % | SYSTOLIC BLOOD PRESSURE: 135 MMHG | RESPIRATION RATE: 16 BRPM

## 2018-03-26 DIAGNOSIS — B02.9 ZOSTER W/OUT COMPLICATIONS: ICD-10-CM

## 2018-03-26 PROCEDURE — 99215 OFFICE O/P EST HI 40 MIN: CPT

## 2018-03-28 ENCOUNTER — APPOINTMENT (OUTPATIENT)
Dept: PULMONOLOGY | Facility: CLINIC | Age: 83
End: 2018-03-28

## 2018-04-02 ENCOUNTER — APPOINTMENT (OUTPATIENT)
Dept: PULMONOLOGY | Facility: CLINIC | Age: 83
End: 2018-04-02

## 2018-04-03 ENCOUNTER — RX RENEWAL (OUTPATIENT)
Age: 83
End: 2018-04-03

## 2018-04-04 ENCOUNTER — APPOINTMENT (OUTPATIENT)
Dept: PULMONOLOGY | Facility: CLINIC | Age: 83
End: 2018-04-04

## 2018-04-06 ENCOUNTER — APPOINTMENT (OUTPATIENT)
Dept: PULMONOLOGY | Facility: CLINIC | Age: 83
End: 2018-04-06

## 2018-04-09 ENCOUNTER — APPOINTMENT (OUTPATIENT)
Dept: PULMONOLOGY | Facility: CLINIC | Age: 83
End: 2018-04-09

## 2018-04-11 ENCOUNTER — APPOINTMENT (OUTPATIENT)
Dept: PULMONOLOGY | Facility: CLINIC | Age: 83
End: 2018-04-11

## 2018-04-12 ENCOUNTER — MEDICATION RENEWAL (OUTPATIENT)
Age: 83
End: 2018-04-12

## 2018-04-13 ENCOUNTER — APPOINTMENT (OUTPATIENT)
Dept: PULMONOLOGY | Facility: CLINIC | Age: 83
End: 2018-04-13

## 2018-04-14 ENCOUNTER — EMERGENCY (EMERGENCY)
Facility: HOSPITAL | Age: 83
LOS: 1 days | Discharge: HOME | End: 2018-04-14
Admitting: EMERGENCY MEDICINE
Payer: MEDICARE

## 2018-04-14 DIAGNOSIS — J45.909 UNSPECIFIED ASTHMA, UNCOMPLICATED: ICD-10-CM

## 2018-04-14 DIAGNOSIS — Z79.899 OTHER LONG TERM (CURRENT) DRUG THERAPY: ICD-10-CM

## 2018-04-14 DIAGNOSIS — I25.10 ATHEROSCLEROTIC HEART DISEASE OF NATIVE CORONARY ARTERY WITHOUT ANGINA PECTORIS: ICD-10-CM

## 2018-04-14 DIAGNOSIS — Z90.49 ACQUIRED ABSENCE OF OTHER SPECIFIED PARTS OF DIGESTIVE TRACT: ICD-10-CM

## 2018-04-14 DIAGNOSIS — N40.0 BENIGN PROSTATIC HYPERPLASIA WITHOUT LOWER URINARY TRACT SYMPTOMS: ICD-10-CM

## 2018-04-14 DIAGNOSIS — K91.89 OTHER POSTPROCEDURAL COMPLICATIONS AND DISORDERS OF DIGESTIVE SYSTEM: Chronic | ICD-10-CM

## 2018-04-14 DIAGNOSIS — Z90.89 ACQUIRED ABSENCE OF OTHER ORGANS: Chronic | ICD-10-CM

## 2018-04-14 DIAGNOSIS — I10 ESSENTIAL (PRIMARY) HYPERTENSION: ICD-10-CM

## 2018-04-14 DIAGNOSIS — Z96.642 PRESENCE OF LEFT ARTIFICIAL HIP JOINT: ICD-10-CM

## 2018-04-14 DIAGNOSIS — Z88.0 ALLERGY STATUS TO PENICILLIN: ICD-10-CM

## 2018-04-14 DIAGNOSIS — Z86.73 PERSONAL HISTORY OF TRANSIENT ISCHEMIC ATTACK (TIA), AND CEREBRAL INFARCTION WITHOUT RESIDUAL DEFICITS: ICD-10-CM

## 2018-04-14 DIAGNOSIS — Z95.5 PRESENCE OF CORONARY ANGIOPLASTY IMPLANT AND GRAFT: Chronic | ICD-10-CM

## 2018-04-14 DIAGNOSIS — M12.9 ARTHROPATHY, UNSPECIFIED: Chronic | ICD-10-CM

## 2018-04-14 DIAGNOSIS — R07.89 OTHER CHEST PAIN: ICD-10-CM

## 2018-04-14 DIAGNOSIS — E78.00 PURE HYPERCHOLESTEROLEMIA, UNSPECIFIED: ICD-10-CM

## 2018-04-14 DIAGNOSIS — Z98.890 OTHER SPECIFIED POSTPROCEDURAL STATES: Chronic | ICD-10-CM

## 2018-04-14 DIAGNOSIS — Z96.649 PRESENCE OF UNSPECIFIED ARTIFICIAL HIP JOINT: Chronic | ICD-10-CM

## 2018-04-14 PROCEDURE — 36415 COLL VENOUS BLD VENIPUNCTURE: CPT

## 2018-04-14 PROCEDURE — 99284 EMERGENCY DEPT VISIT MOD MDM: CPT | Mod: 25

## 2018-04-14 PROCEDURE — 82550 ASSAY OF CK (CPK): CPT

## 2018-04-14 PROCEDURE — 93010 ELECTROCARDIOGRAM REPORT: CPT

## 2018-04-14 PROCEDURE — 99285 EMERGENCY DEPT VISIT HI MDM: CPT

## 2018-04-14 PROCEDURE — 93005 ELECTROCARDIOGRAM TRACING: CPT

## 2018-04-14 PROCEDURE — 83880 ASSAY OF NATRIURETIC PEPTIDE: CPT

## 2018-04-14 PROCEDURE — 71045 X-RAY EXAM CHEST 1 VIEW: CPT

## 2018-04-14 PROCEDURE — 85730 THROMBOPLASTIN TIME PARTIAL: CPT

## 2018-04-14 PROCEDURE — 85027 COMPLETE CBC AUTOMATED: CPT

## 2018-04-14 PROCEDURE — 80053 COMPREHEN METABOLIC PANEL: CPT

## 2018-04-14 PROCEDURE — 85610 PROTHROMBIN TIME: CPT

## 2018-04-14 PROCEDURE — 71045 X-RAY EXAM CHEST 1 VIEW: CPT | Mod: 26

## 2018-04-14 PROCEDURE — 84484 ASSAY OF TROPONIN QUANT: CPT

## 2018-04-16 ENCOUNTER — INPATIENT (INPATIENT)
Facility: HOSPITAL | Age: 83
LOS: 3 days | Discharge: HOME | DRG: 247 | End: 2018-04-20
Attending: INTERNAL MEDICINE | Admitting: INTERNAL MEDICINE
Payer: MEDICARE

## 2018-04-16 ENCOUNTER — APPOINTMENT (OUTPATIENT)
Dept: PULMONOLOGY | Facility: CLINIC | Age: 83
End: 2018-04-16

## 2018-04-16 VITALS
HEIGHT: 63 IN | SYSTOLIC BLOOD PRESSURE: 122 MMHG | TEMPERATURE: 98 F | HEART RATE: 66 BPM | RESPIRATION RATE: 18 BRPM | DIASTOLIC BLOOD PRESSURE: 56 MMHG | WEIGHT: 123.02 LBS | OXYGEN SATURATION: 98 %

## 2018-04-16 DIAGNOSIS — Z96.649 PRESENCE OF UNSPECIFIED ARTIFICIAL HIP JOINT: Chronic | ICD-10-CM

## 2018-04-16 DIAGNOSIS — Z90.89 ACQUIRED ABSENCE OF OTHER ORGANS: Chronic | ICD-10-CM

## 2018-04-16 DIAGNOSIS — R07.9 CHEST PAIN, UNSPECIFIED: ICD-10-CM

## 2018-04-16 DIAGNOSIS — M12.9 ARTHROPATHY, UNSPECIFIED: Chronic | ICD-10-CM

## 2018-04-16 DIAGNOSIS — Z95.5 PRESENCE OF CORONARY ANGIOPLASTY IMPLANT AND GRAFT: Chronic | ICD-10-CM

## 2018-04-16 DIAGNOSIS — K91.89 OTHER POSTPROCEDURAL COMPLICATIONS AND DISORDERS OF DIGESTIVE SYSTEM: Chronic | ICD-10-CM

## 2018-04-16 DIAGNOSIS — Z98.890 OTHER SPECIFIED POSTPROCEDURAL STATES: Chronic | ICD-10-CM

## 2018-04-16 LAB
ALBUMIN SERPL ELPH-MCNC: 2.8 G/DL — LOW (ref 3.3–5)
ALP SERPL-CCNC: 295 U/L — HIGH (ref 40–120)
ALT FLD-CCNC: 55 U/L RC — HIGH (ref 10–45)
ANION GAP SERPL CALC-SCNC: 10 MMOL/L — SIGNIFICANT CHANGE UP (ref 5–17)
AST SERPL-CCNC: 69 U/L — HIGH (ref 10–40)
BILIRUB SERPL-MCNC: 0.3 MG/DL — SIGNIFICANT CHANGE UP (ref 0.2–1.2)
BUN SERPL-MCNC: 22 MG/DL — SIGNIFICANT CHANGE UP (ref 7–23)
CALCIUM SERPL-MCNC: 9.5 MG/DL — SIGNIFICANT CHANGE UP (ref 8.4–10.5)
CHLORIDE SERPL-SCNC: 104 MMOL/L — SIGNIFICANT CHANGE UP (ref 96–108)
CO2 SERPL-SCNC: 25 MMOL/L — SIGNIFICANT CHANGE UP (ref 22–31)
CREAT SERPL-MCNC: 0.64 MG/DL — SIGNIFICANT CHANGE UP (ref 0.5–1.3)
GLUCOSE SERPL-MCNC: 78 MG/DL — SIGNIFICANT CHANGE UP (ref 70–99)
HCT VFR BLD CALC: 28.6 % — LOW (ref 39–50)
HGB BLD-MCNC: 9.4 G/DL — LOW (ref 13–17)
MCHC RBC-ENTMCNC: 31.6 PG — SIGNIFICANT CHANGE UP (ref 27–34)
MCHC RBC-ENTMCNC: 32.8 GM/DL — SIGNIFICANT CHANGE UP (ref 32–36)
MCV RBC AUTO: 96.4 FL — SIGNIFICANT CHANGE UP (ref 80–100)
PLATELET # BLD AUTO: 186 K/UL — SIGNIFICANT CHANGE UP (ref 150–400)
POTASSIUM SERPL-MCNC: 4.9 MMOL/L — SIGNIFICANT CHANGE UP (ref 3.5–5.3)
POTASSIUM SERPL-SCNC: 4.9 MMOL/L — SIGNIFICANT CHANGE UP (ref 3.5–5.3)
PROT SERPL-MCNC: 7.4 G/DL — SIGNIFICANT CHANGE UP (ref 6–8.3)
RBC # BLD: 2.96 M/UL — LOW (ref 4.2–5.8)
RBC # FLD: 14.9 % — HIGH (ref 10.3–14.5)
SODIUM SERPL-SCNC: 139 MMOL/L — SIGNIFICANT CHANGE UP (ref 135–145)
WBC # BLD: 5 K/UL — SIGNIFICANT CHANGE UP (ref 3.8–10.5)
WBC # FLD AUTO: 5 K/UL — SIGNIFICANT CHANGE UP (ref 3.8–10.5)

## 2018-04-16 PROCEDURE — 93454 CORONARY ARTERY ANGIO S&I: CPT | Mod: 26,59

## 2018-04-16 PROCEDURE — 92928 PRQ TCAT PLMT NTRAC ST 1 LES: CPT | Mod: RC

## 2018-04-16 PROCEDURE — 76937 US GUIDE VASCULAR ACCESS: CPT | Mod: 26

## 2018-04-16 PROCEDURE — 99152 MOD SED SAME PHYS/QHP 5/>YRS: CPT

## 2018-04-16 PROCEDURE — 93010 ELECTROCARDIOGRAM REPORT: CPT

## 2018-04-16 RX ORDER — BUDESONIDE, MICRONIZED 100 %
2 POWDER (GRAM) MISCELLANEOUS
Qty: 0 | Refills: 0 | COMMUNITY

## 2018-04-16 RX ORDER — MONTELUKAST 4 MG/1
10 TABLET, CHEWABLE ORAL DAILY
Qty: 0 | Refills: 0 | Status: DISCONTINUED | OUTPATIENT
Start: 2018-04-16 | End: 2018-04-20

## 2018-04-16 RX ORDER — ALBUTEROL 90 UG/1
2 AEROSOL, METERED ORAL EVERY 6 HOURS
Qty: 0 | Refills: 0 | Status: DISCONTINUED | OUTPATIENT
Start: 2018-04-16 | End: 2018-04-20

## 2018-04-16 RX ORDER — SIMVASTATIN 20 MG/1
40 TABLET, FILM COATED ORAL AT BEDTIME
Qty: 0 | Refills: 0 | Status: DISCONTINUED | OUTPATIENT
Start: 2018-04-16 | End: 2018-04-16

## 2018-04-16 RX ORDER — METOPROLOL TARTRATE 50 MG
12.5 TABLET ORAL
Qty: 0 | Refills: 0 | Status: DISCONTINUED | OUTPATIENT
Start: 2018-04-16 | End: 2018-04-18

## 2018-04-16 RX ORDER — ALPRAZOLAM 0.25 MG
0.5 TABLET ORAL
Qty: 0 | Refills: 0 | Status: DISCONTINUED | OUTPATIENT
Start: 2018-04-16 | End: 2018-04-20

## 2018-04-16 RX ORDER — AMLODIPINE BESYLATE 2.5 MG/1
5 TABLET ORAL DAILY
Qty: 0 | Refills: 0 | Status: DISCONTINUED | OUTPATIENT
Start: 2018-04-16 | End: 2018-04-18

## 2018-04-16 RX ORDER — ALPRAZOLAM 0.25 MG
2 TABLET ORAL
Qty: 0 | Refills: 0 | COMMUNITY

## 2018-04-16 RX ORDER — FOLIC ACID 0.8 MG
1 TABLET ORAL DAILY
Qty: 0 | Refills: 0 | Status: DISCONTINUED | OUTPATIENT
Start: 2018-04-16 | End: 2018-04-20

## 2018-04-16 RX ORDER — SIMVASTATIN 20 MG/1
20 TABLET, FILM COATED ORAL AT BEDTIME
Qty: 0 | Refills: 0 | Status: DISCONTINUED | OUTPATIENT
Start: 2018-04-16 | End: 2018-04-20

## 2018-04-16 RX ORDER — SODIUM CHLORIDE 9 MG/ML
3 INJECTION INTRAMUSCULAR; INTRAVENOUS; SUBCUTANEOUS EVERY 8 HOURS
Qty: 0 | Refills: 0 | Status: DISCONTINUED | OUTPATIENT
Start: 2018-04-16 | End: 2018-04-20

## 2018-04-16 RX ORDER — FINASTERIDE 5 MG/1
5 TABLET, FILM COATED ORAL DAILY
Qty: 0 | Refills: 0 | Status: DISCONTINUED | OUTPATIENT
Start: 2018-04-16 | End: 2018-04-20

## 2018-04-16 RX ORDER — LOSARTAN POTASSIUM 100 MG/1
50 TABLET, FILM COATED ORAL DAILY
Qty: 0 | Refills: 0 | Status: DISCONTINUED | OUTPATIENT
Start: 2018-04-16 | End: 2018-04-18

## 2018-04-16 RX ORDER — CLOPIDOGREL BISULFATE 75 MG/1
75 TABLET, FILM COATED ORAL DAILY
Qty: 0 | Refills: 0 | Status: DISCONTINUED | OUTPATIENT
Start: 2018-04-16 | End: 2018-04-20

## 2018-04-16 RX ORDER — ASPIRIN/CALCIUM CARB/MAGNESIUM 324 MG
81 TABLET ORAL DAILY
Qty: 0 | Refills: 0 | Status: DISCONTINUED | OUTPATIENT
Start: 2018-04-16 | End: 2018-04-20

## 2018-04-16 RX ADMIN — SODIUM CHLORIDE 3 MILLILITER(S): 9 INJECTION INTRAMUSCULAR; INTRAVENOUS; SUBCUTANEOUS at 21:07

## 2018-04-16 RX ADMIN — CLOPIDOGREL BISULFATE 75 MILLIGRAM(S): 75 TABLET, FILM COATED ORAL at 19:01

## 2018-04-16 RX ADMIN — Medication 81 MILLIGRAM(S): at 19:01

## 2018-04-16 NOTE — H&P CARDIOLOGY - PMH
Anxiety    Asthma    BCC (basal cell carcinoma)  skin  BPH (benign prostatic hyperplasia)    Cerebrovascular accident (CVA)    Chronic allergic rhinitis    Coronary artery disease  s/p 3 stents on June 30, 2017 at [place] Dr. Lavelle Reid  Esophagus cancer    Former smoker, stopped smoking in distant past    HTN (hypertension)    Hyperlipidemia    OA (osteoarthritis) of knee  right  Rheumatoid arthritis    Seizure disorder  1 episode approximately 15 yrs ago

## 2018-04-16 NOTE — PATIENT PROFILE ADULT. - ABILITY TO HEAR (WITH HEARING AID OR HEARING APPLIANCE IF NORMALLY USED):
b/l HA/Mildly to Moderately Impaired: difficulty hearing in some environments or speaker may need to increase volume or speak distinctly

## 2018-04-16 NOTE — PATIENT PROFILE ADULT. - FALL HARM RISK
coagulation(Bleeding disorder R/T clinical cond/anti-coags)/bones(Osteoporosis,prev fx,steroid use,metastatic bone ca/other/post fall

## 2018-04-16 NOTE — H&P CARDIOLOGY - FAMILY HISTORY
Sibling  Still living? No  FH: CAD (coronary artery disease), Age at diagnosis: Age Unknown     Mother  Still living? Unknown  Family history of heart attack, Age at diagnosis: Age Unknown     Father  Still living? No  Family history of pulmonary embolism, Age at diagnosis: Age Unknown

## 2018-04-17 ENCOUNTER — TRANSCRIPTION ENCOUNTER (OUTPATIENT)
Age: 83
End: 2018-04-17

## 2018-04-17 LAB
ANION GAP SERPL CALC-SCNC: 8 MMOL/L — SIGNIFICANT CHANGE UP (ref 5–17)
BLD GP AB SCN SERPL QL: NEGATIVE — SIGNIFICANT CHANGE UP
BUN SERPL-MCNC: 18 MG/DL — SIGNIFICANT CHANGE UP (ref 7–23)
CALCIUM SERPL-MCNC: 9.1 MG/DL — SIGNIFICANT CHANGE UP (ref 8.4–10.5)
CHLORIDE SERPL-SCNC: 105 MMOL/L — SIGNIFICANT CHANGE UP (ref 96–108)
CO2 SERPL-SCNC: 24 MMOL/L — SIGNIFICANT CHANGE UP (ref 22–31)
CREAT SERPL-MCNC: 0.64 MG/DL — SIGNIFICANT CHANGE UP (ref 0.5–1.3)
GLUCOSE SERPL-MCNC: 104 MG/DL — HIGH (ref 70–99)
HCT VFR BLD CALC: 25.7 % — LOW (ref 39–50)
HCT VFR BLD CALC: 26.8 % — LOW (ref 39–50)
HGB BLD-MCNC: 8.8 G/DL — LOW (ref 13–17)
HGB BLD-MCNC: 9 G/DL — LOW (ref 13–17)
MCHC RBC-ENTMCNC: 32.3 PG — SIGNIFICANT CHANGE UP (ref 27–34)
MCHC RBC-ENTMCNC: 32.9 PG — SIGNIFICANT CHANGE UP (ref 27–34)
MCHC RBC-ENTMCNC: 33.7 GM/DL — SIGNIFICANT CHANGE UP (ref 32–36)
MCHC RBC-ENTMCNC: 34.3 GM/DL — SIGNIFICANT CHANGE UP (ref 32–36)
MCV RBC AUTO: 95.8 FL — SIGNIFICANT CHANGE UP (ref 80–100)
MCV RBC AUTO: 95.9 FL — SIGNIFICANT CHANGE UP (ref 80–100)
PLATELET # BLD AUTO: 169 K/UL — SIGNIFICANT CHANGE UP (ref 150–400)
PLATELET # BLD AUTO: 175 K/UL — SIGNIFICANT CHANGE UP (ref 150–400)
POTASSIUM SERPL-MCNC: 4 MMOL/L — SIGNIFICANT CHANGE UP (ref 3.5–5.3)
POTASSIUM SERPL-SCNC: 4 MMOL/L — SIGNIFICANT CHANGE UP (ref 3.5–5.3)
RBC # BLD: 2.68 M/UL — LOW (ref 4.2–5.8)
RBC # BLD: 2.8 M/UL — LOW (ref 4.2–5.8)
RBC # FLD: 14.8 % — HIGH (ref 10.3–14.5)
RBC # FLD: 15 % — HIGH (ref 10.3–14.5)
RH IG SCN BLD-IMP: POSITIVE — SIGNIFICANT CHANGE UP
SODIUM SERPL-SCNC: 137 MMOL/L — SIGNIFICANT CHANGE UP (ref 135–145)
WBC # BLD: 3.9 K/UL — SIGNIFICANT CHANGE UP (ref 3.8–10.5)
WBC # BLD: 4.1 K/UL — SIGNIFICANT CHANGE UP (ref 3.8–10.5)
WBC # FLD AUTO: 3.9 K/UL — SIGNIFICANT CHANGE UP (ref 3.8–10.5)
WBC # FLD AUTO: 4.1 K/UL — SIGNIFICANT CHANGE UP (ref 3.8–10.5)

## 2018-04-17 PROCEDURE — 92921: CPT | Mod: LD

## 2018-04-17 PROCEDURE — 93010 ELECTROCARDIOGRAM REPORT: CPT

## 2018-04-17 PROCEDURE — 92928 PRQ TCAT PLMT NTRAC ST 1 LES: CPT | Mod: LD

## 2018-04-17 PROCEDURE — 76937 US GUIDE VASCULAR ACCESS: CPT | Mod: 26

## 2018-04-17 PROCEDURE — 99152 MOD SED SAME PHYS/QHP 5/>YRS: CPT

## 2018-04-17 RX ORDER — CLOPIDOGREL BISULFATE 75 MG/1
1 TABLET, FILM COATED ORAL
Qty: 30 | Refills: 2 | OUTPATIENT
Start: 2018-04-17

## 2018-04-17 RX ORDER — FENTANYL CITRATE 50 UG/ML
25 INJECTION INTRAVENOUS ONCE
Qty: 0 | Refills: 0 | Status: DISCONTINUED | OUTPATIENT
Start: 2018-04-17 | End: 2018-04-17

## 2018-04-17 RX ADMIN — FENTANYL CITRATE 25 MICROGRAM(S): 50 INJECTION INTRAVENOUS at 20:12

## 2018-04-17 RX ADMIN — Medication 12.5 MILLIGRAM(S): at 00:30

## 2018-04-17 RX ADMIN — FENTANYL CITRATE 25 MICROGRAM(S): 50 INJECTION INTRAVENOUS at 20:43

## 2018-04-17 RX ADMIN — MONTELUKAST 10 MILLIGRAM(S): 4 TABLET, CHEWABLE ORAL at 00:30

## 2018-04-17 RX ADMIN — LOSARTAN POTASSIUM 50 MILLIGRAM(S): 100 TABLET, FILM COATED ORAL at 12:00

## 2018-04-17 RX ADMIN — CLOPIDOGREL BISULFATE 75 MILLIGRAM(S): 75 TABLET, FILM COATED ORAL at 12:00

## 2018-04-17 RX ADMIN — Medication 200 MILLIGRAM(S): at 00:29

## 2018-04-17 RX ADMIN — SODIUM CHLORIDE 3 MILLILITER(S): 9 INJECTION INTRAMUSCULAR; INTRAVENOUS; SUBCUTANEOUS at 21:57

## 2018-04-17 RX ADMIN — Medication 81 MILLIGRAM(S): at 12:00

## 2018-04-17 RX ADMIN — SODIUM CHLORIDE 3 MILLILITER(S): 9 INJECTION INTRAMUSCULAR; INTRAVENOUS; SUBCUTANEOUS at 11:35

## 2018-04-17 RX ADMIN — Medication 12.5 MILLIGRAM(S): at 21:57

## 2018-04-17 RX ADMIN — Medication 0.5 MILLIGRAM(S): at 00:35

## 2018-04-17 RX ADMIN — AMLODIPINE BESYLATE 5 MILLIGRAM(S): 2.5 TABLET ORAL at 00:32

## 2018-04-17 RX ADMIN — Medication 5 MILLIGRAM(S): at 00:29

## 2018-04-17 RX ADMIN — FINASTERIDE 5 MILLIGRAM(S): 5 TABLET, FILM COATED ORAL at 00:30

## 2018-04-17 RX ADMIN — Medication 0.5 MILLIGRAM(S): at 21:56

## 2018-04-17 RX ADMIN — Medication 200 MILLIGRAM(S): at 21:57

## 2018-04-17 RX ADMIN — SIMVASTATIN 20 MILLIGRAM(S): 20 TABLET, FILM COATED ORAL at 21:57

## 2018-04-17 RX ADMIN — Medication 1 MILLIGRAM(S): at 12:00

## 2018-04-17 NOTE — DISCHARGE NOTE ADULT - MEDICATION SUMMARY - MEDICATIONS TO TAKE
I will START or STAY ON the medications listed below when I get home from the hospital:    finasteride 5 mg oral tablet  -- 1 tab(s) by mouth once a day in pm  home/hosp  -- Indication: For prostate    predniSONE 5 mg oral tablet  -- 1 tab(s) by mouth once a day in am  home  -- Indication: For Copd    aspirin 81 mg oral delayed release tablet  -- 1 tab(s) by mouth once a day  -- Indication: For heart disease    olmesartan 20 mg oral tablet  -- 1 tab(s) by mouth once a day in pm  home  -- Indication: For hypertension    Dilantin 100 mg oral capsule  -- 2 cap(s) by mouth 2 times a day  home/hosp  -- Indication: For s    simvastatin 40 mg oral tablet  -- 1 tab(s) by mouth once a day in pm  home/hosp  -- Indication: For high lipid    Zetia 10 mg oral tablet  -- 1 tab(s) by mouth once a day in pm  home  -- Indication: For high lipids    clopidogrel 75 mg oral tablet  -- 1 tab(s) by mouth once a day MDD:1  -- Indication: For heart disease    ALPRAZolam 0.25 mg oral tablet  -- 1 tab(s) by mouth 4 times a day  -- Indication: For anxiety    metoprolol tartrate 25 mg oral tablet  -- 0.5 tab(s) by mouth 2 times a day MDD:1tab  -- Indication: For hypertension    ProAir HFA 90 mcg/inh inhalation aerosol  -- 2 puff(s) inhaled 4 times a day, As Needed   home/hosp  -- Indication: For Copd    amLODIPine 5 mg oral tablet  -- 1 tab(s) by mouth once a day in pm  home/hosp  -- Indication: For hypertension    montelukast 10 mg oral tablet  -- 1 tab(s) by mouth once a day in pm  hosp/home  -- Indication: For Copd    Vitamin B6 100 mg oral tablet  -- 1 tab(s) by mouth once a day  home/hosp  -- Indication: For supplement    Vitamin B-12 500 mcg oral tablet  -- 1 tab(s) by mouth once a day  home/hosp  -- Indication: For supplement    folic acid 1 mg oral tablet  -- 1 tab(s) by mouth once a day  home/hosp  -- Indication: For supplement I will START or STAY ON the medications listed below when I get home from the hospital:    finasteride 5 mg oral tablet  -- 1 tab(s) by mouth once a day in pm  home/hosp  -- Indication: For prostate    predniSONE 5 mg oral tablet  -- 1 tab(s) by mouth once a day in am  home  -- Indication: For Copd    aspirin 81 mg oral delayed release tablet  -- 1 tab(s) by mouth once a day  -- Indication: For heart disease    olmesartan 5 mg oral tablet  -- 2 tab(s) by mouth once a day (at bedtime) MDD:2  -- Do not take this drug if you are pregnant.  It is very important that you take or use this exactly as directed.  Do not skip doses or discontinue unless directed by your doctor.  Some non-prescription drugs may aggravate your condition.  Read all labels carefully.  If a warning appears, check with your doctor before taking.    -- Indication: For blood pressure    Dilantin 100 mg oral capsule  -- 2 cap(s) by mouth 2 times a day  home/hosp  -- Indication: For Chest pain    simvastatin 40 mg oral tablet  -- 1 tab(s) by mouth once a day in pm  home/hosp  -- Indication: For high lipid    Zetia 10 mg oral tablet  -- 1 tab(s) by mouth once a day in pm  home  -- Indication: For high lipids    clopidogrel 75 mg oral tablet  -- 1 tab(s) by mouth once a day MDD:1  -- Indication: For heart disease    ALPRAZolam 0.25 mg oral tablet  -- 1 tab(s) by mouth 4 times a day  -- Indication: For anxiety    metoprolol succinate 25 mg oral tablet, extended release  -- 0.5 tab(s) by mouth once a day MDD:1/2 pill  -- It is very important that you take or use this exactly as directed.  Do not skip doses or discontinue unless directed by your doctor.  May cause drowsiness.  Alcohol may intensify this effect.  Use care when operating dangerous machinery.  Some non-prescription drugs may aggravate your condition.  Read all labels carefully.  If a warning appears, check with your doctor before taking.  Swallow whole.  Do not crush.  Take with food or milk.  This drug may impair the ability to drive or operate machinery.  Use care until you become familiar with its effects.    -- Indication: For high blood pressure    ProAir HFA 90 mcg/inh inhalation aerosol  -- 2 puff(s) inhaled 4 times a day, As Needed   home/hosp  -- Indication: For Copd    amLODIPine 2.5 mg oral tablet  -- 1 tab(s) by mouth once a day MDD:1  -- It is very important that you take or use this exactly as directed.  Do not skip doses or discontinue unless directed by your doctor.  Some non-prescription drugs may aggravate your condition.  Read all labels carefully.  If a warning appears, check with your doctor before taking.    -- Indication: For high blood pressure    montelukast 10 mg oral tablet  -- 1 tab(s) by mouth once a day in pm  hosp/home  -- Indication: For Copd    Vitamin B-12 500 mcg oral tablet  -- 1 tab(s) by mouth once a day  home/hosp  -- Indication: For supplement    Vitamin B6 100 mg oral tablet  -- 1 tab(s) by mouth once a day  home/hosp  -- Indication: For supplement    folic acid 1 mg oral tablet  -- 1 tab(s) by mouth once a day  home/hosp  -- Indication: For supplement I will START or STAY ON the medications listed below when I get home from the hospital:    finasteride 5 mg oral tablet  -- 1 tab(s) by mouth once a day in pm  home/hosp  -- Indication: For prostate    predniSONE 20 mg oral tablet  -- 1 tab(s) by mouth once a day  -- Indication: For hypotension    aspirin 81 mg oral delayed release tablet  -- 1 tab(s) by mouth once a day  -- Indication: For heart disease    Dilantin 100 mg oral capsule  -- 2 cap(s) by mouth 2 times a day  home/hosp  -- Indication: For Chest pain    simvastatin 40 mg oral tablet  -- 1 tab(s) by mouth once a day in pm  home/hosp  -- Indication: For high lipid    Zetia 10 mg oral tablet  -- 1 tab(s) by mouth once a day in pm  home  -- Indication: For high lipids    clopidogrel 75 mg oral tablet  -- 1 tab(s) by mouth once a day MDD:1  -- Indication: For heart disease    ALPRAZolam 0.25 mg oral tablet  -- 1 tab(s) by mouth 4 times a day  -- Indication: For anxiety    ProAir HFA 90 mcg/inh inhalation aerosol  -- 2 puff(s) inhaled 4 times a day, As Needed   home/hosp  -- Indication: For Copd    montelukast 10 mg oral tablet  -- 1 tab(s) by mouth once a day in pm  hosp/home  -- Indication: For Copd    midodrine 5 mg oral tablet  -- 1 tab(s) by mouth 3 times a day   -- It is very important that you take or use this exactly as directed.  Do not skip doses or discontinue unless directed by your doctor.  Obtain medical advice before taking any non-prescription drugs as some may affect the action of this medication.    -- Indication: For hypotension    Vitamin B-12 500 mcg oral tablet  -- 1 tab(s) by mouth once a day  home/hosp  -- Indication: For supplement    Vitamin B6 100 mg oral tablet  -- 1 tab(s) by mouth once a day  home/hosp  -- Indication: For supplement    folic acid 1 mg oral tablet  -- 1 tab(s) by mouth once a day  home/hosp  -- Indication: For supplement

## 2018-04-17 NOTE — DISCHARGE NOTE ADULT - CARE PROVIDER_API CALL
Leif Santiago), Cardiovascular Disease; Internal Medicine  [address] [place] [address]  Phone: [phone]  Fax: [phone] Leif Santiago), Cardiovascular Disease; Internal Medicine  [address] [place] [address]  Phone: [phone]  Fax: [phone]    Real Figueroa), Cardiovascular Disease; Interventional Cardiology  [address] [place] [address]  Phone: [phone]  Fax: [phone] Leif Santiago), Cardiovascular Disease; Internal Medicine  [address] [place] [address]  Phone: [phone]  Fax: [phone]    Lavelle Reid), Cardiovascular Disease; Interventional Cardiology  [address] [place] [address]  Phone: [phone]  Fax: [phone]

## 2018-04-17 NOTE — DISCHARGE NOTE ADULT - CONDITION (STATED IN TERMS THAT PERMIT A SPECIFIC MEASURABLE COMPARISON WITH CONDITION ON ADMISSION):
stable vs stable, denies chest pain, groin stable w/o bleeding or hematoma vs stable, denies chest pain, groin stable w/o bleeding or hematoma, ok to be DC home at this time as per Md Reid

## 2018-04-17 NOTE — DISCHARGE NOTE ADULT - HOSPITAL COURSE
82 year old  male pt with PMHX of CAD with stents x5, esophageal cancer s/p esophagectomy, HTN, HLD, anxiety, arthritis, recent shingles, asthma presents for cardiac cath. Pt reports he had an episode of chest pain(mid sternal chest pressure radiated to right arm, lasted about an hour on 4/11), went to [facility] ER and released after having some blood works and EKG. Then pt had f/u visit with Leif Santiago this morning then referred for cath. pt denies chest pain since ER visit.   Pt s/p PCI: ROJAS x 2 to LAD via R groin. Pt tolerated procedure well and overnight remained uneventful. No c/o chest pain or SOB. Pt is hemodynamically stable, EKG and all lab results reviewed. Insertion/incision site benign, no bleeding or hematoma, and cath site dressing changed. Discharge teaching provided to Pt/caretaker and verbalized understanding the instruction. Pt is stable for discharge home as per attending. 82 year old  male pt with PMHX of CAD with stents x5, esophageal cancer s/p esophagectomy, HTN, HLD, anxiety, arthritis, recent shingles, asthma presents for cardiac cath. Pt reports he had an episode of chest pain(mid sternal chest pressure radiated to right arm, lasted about an hour on 4/11), went to [facility] ER and released after having some blood works and EKG. Then pt had f/u visit with Leif Santiago this morning then referred for cath. pt denies chest pain since ER visit.   Pt s/p PCI: ROJAS x 2 to LAD via Lt groin. Pt tolerated procedure well and overnight remained uneventful. No c/o chest pain or SOB. Pt is hemodynamically stable, EKG and all lab results reviewed. Insertion/incision site benign, no bleeding or hematoma, and cath site dressing changed. Discharge teaching provided to Pt/caretaker and verbalized understanding the instruction. Pt is stable for discharge home as per attending. 82 year old  male pt with PMHX of CAD with stents x5, esophageal cancer s/p esophagectomy, HTN, HLD, anxiety, arthritis, recent shingles, asthma presents for cardiac cath. Pt reports he had an episode of chest pain(mid sternal chest pressure radiated to right arm, lasted about an hour on 4/11), went to [facility] ER and released after having some blood works and EKG. Then pt had f/u visit with Leif Santiago this morning then referred for cath. pt denies chest pain since ER visit.   Pt s/p PCI: ROJAS x 2 to LAD via Lt groin. Pt tolerated procedure well and overnight remained uneventful. No c/o chest pain or SOB. Pt is hemodynamically stable, EKG and all lab results reviewed. Insertion/incision site benign, no bleeding or hematoma, and cath site dressing changed. Discharge teaching provided to Pt/caretaker and verbalized understanding the instruction. Pt is stable for discharge home as per attending.     Course complicated by hypotension/anemia: s/p 1 unit prbc, bolus fluid, midodrine , steroids

## 2018-04-17 NOTE — PROGRESS NOTE ADULT - SUBJECTIVE AND OBJECTIVE BOX
Patient is a 82y old  Male who presents with a chief complaint of cp (2018 18:34) now s/p [facility] ROJAS x 1 rRCA, LAD 90% via right radial artery access. Staged LAD        Allergies    penicillin (Rash)    Intolerances        Medications:  ALBUTerol    90 MICROgram(s) HFA Inhaler 2 Puff(s) Inhalation every 6 hours PRN  ALPRAZolam 0.5 milliGRAM(s) Oral two times a day  amLODIPine   Tablet 5 milliGRAM(s) Oral daily  aspirin enteric coated 81 milliGRAM(s) Oral daily  clopidogrel Tablet 75 milliGRAM(s) Oral daily  finasteride 5 milliGRAM(s) Oral daily  folic acid 1 milliGRAM(s) Oral daily  losartan 50 milliGRAM(s) Oral daily  metoprolol tartrate 12.5 milliGRAM(s) Oral two times a day  montelukast 10 milliGRAM(s) Oral daily  phenytoin   Capsule 200 milliGRAM(s) Oral two times a day  predniSONE   Tablet 5 milliGRAM(s) Oral daily  simvastatin 20 milliGRAM(s) Oral at bedtime  sodium chloride 0.9% lock flush 3 milliLiter(s) IV Push every 8 hours      Vitals:  T(C): 35.6 (18 @ 20:50), Max: 36.7 (18 @ 14:22)  HR: 71 (18 @ 00:30) (66 - 71)  BP: 118/65 (18 @ 00:30) (116/56 - 133/72)  BP(mean): 78 (18 @ 14:22) (78 - 78)  RR: 16 (18 @ 00:30) (16 - 18)  SpO2: 99% (18 @ 00:30) (93% - 100%)  Wt(kg): --  Daily Height in cm: 160.02 (2018 18:20)    Daily Weight in k.8 (2018 14:22)  I&O's Summary    2018 07:01  -  2018 04:14  --------------------------------------------------------  IN: 200 mL / OUT: 0 mL / NET: 200 mL          Physical Exam:  Appearance: Normal  Eyes: PERRL, EOMI  HENT: Normal oral muscosa, NC/AT  Cardiovascular: S1S2, RRR, No M/R/G, no JVD, No Lower extremity edema  Procedural Access Site: Right femoral artery access. No hematoma, Non-tender to palpation, 2+ pulse, No bruit, No Ecchymosis  Respiratory: Clear to auscultation bilaterally  Gastrointestinal: Soft, Non tender, Normal Bowel Sounds  Musculoskeletal: No clubbing, No joint deformity   Neurologic: Non-focal  Lymphatic: No lymphadenopathy  Psychiatry: AAOx3, Mood & affect appropriate  Skin: No rashes, No ecchymoses, No cyanosis        137  |  105  |  18  ----------------------------<  104<H>  4.0   |  24  |  0.64    Ca    9.1      2018 02:18    TPro  7.4  /  Alb  2.8<L>  /  TBili  0.3  /  DBili  x   /  AST  69<H>  /  ALT  55<H>  /  AlkPhos  295<H>            Interpretation of Telemetry:    ASSESSMENT/PLAN   Patient is a 82y old  Male who presents with a chief complaint of cp (2018 18:34) now s/p C ROJAS x 1 rRCA, LAD 90% via right radial artery access. Pt tolerated the procedure well. Overnight remained uneventful.  H/H downtrending. Will re[peat CBC Staged LAD  Patient is a 82y old  Male who presents with a chief complaint of cp (2018 18:34) now s/p [facility] ROJAS x 1 rRCA, LAD 90% via right radial artery access. Staged LAD        Allergies    penicillin (Rash)    Intolerances        Medications:  ALBUTerol    90 MICROgram(s) HFA Inhaler 2 Puff(s) Inhalation every 6 hours PRN  ALPRAZolam 0.5 milliGRAM(s) Oral two times a day  amLODIPine   Tablet 5 milliGRAM(s) Oral daily  aspirin enteric coated 81 milliGRAM(s) Oral daily  clopidogrel Tablet 75 milliGRAM(s) Oral daily  finasteride 5 milliGRAM(s) Oral daily  folic acid 1 milliGRAM(s) Oral daily  losartan 50 milliGRAM(s) Oral daily  metoprolol tartrate 12.5 milliGRAM(s) Oral two times a day  montelukast 10 milliGRAM(s) Oral daily  phenytoin   Capsule 200 milliGRAM(s) Oral two times a day  predniSONE   Tablet 5 milliGRAM(s) Oral daily  simvastatin 20 milliGRAM(s) Oral at bedtime  sodium chloride 0.9% lock flush 3 milliLiter(s) IV Push every 8 hours      Vitals:  T(C): 35.6 (18 @ 20:50), Max: 36.7 (18 @ 14:22)  HR: 71 (18 @ 00:30) (66 - 71)  BP: 118/65 (18 @ 00:30) (116/56 - 133/72)  BP(mean): 78 (18 @ 14:22) (78 - 78)  RR: 16 (18 @ 00:30) (16 - 18)  SpO2: 99% (18 @ 00:30) (93% - 100%)  Wt(kg): --  Daily Height in cm: 160.02 (2018 18:20)    Daily Weight in k.8 (2018 14:22)  I&O's Summary    2018 07:01  -  2018 04:14  --------------------------------------------------------  IN: 200 mL / OUT: 0 mL / NET: 200 mL          Physical Exam:  Appearance: Normal  Eyes: PERRL, EOMI  HENT: Normal oral muscosa, NC/AT  Cardiovascular: S1S2, RRR, No M/R/G, no JVD, No Lower extremity edema  Procedural Access Site: Right femoral artery access. No hematoma, Non-tender to palpation, 2+ pulse, No bruit, No Ecchymosis  Respiratory: Clear to auscultation bilaterally  Gastrointestinal: Soft, Non tender, Normal Bowel Sounds  Musculoskeletal: No clubbing, No joint deformity   Neurologic: Non-focal  Psychiatry: AAOx3, Mood & affect appropriate  Skin: No rashes, No ecchymoses, No cyanosis        137  |  105  |  18  ----------------------------<  104<H>  4.0   |  24  |  0.64    Ca    9.1      2018 02:18    TPro  7.4  /  Alb  2.8<L>  /  TBili  0.3  /  DBili  x   /  AST  69<H>  /  ALT  55<H>  /  AlkPhos  295<H>            Interpretation of Telemetry: SB/SR 55-80bpm     ASSESSMENT/PLAN   Patient is a 82y old  Male who presents with a chief complaint of cp (2018 18:34) now s/p [facility] ROJAS x 1 rRCA, LAD 90% via right radial artery access. Pt tolerated the procedure well. Overnight remained uneventful.  H/H downtrending. Will re[peat CBC Staged LAD

## 2018-04-17 NOTE — DISCHARGE NOTE ADULT - CARE PROVIDERS DIRECT ADDRESSES
,[email or web address]

## 2018-04-17 NOTE — DISCHARGE NOTE ADULT - PLAN OF CARE
Pt remains chest pain free and understands post cath discharge instructions Do not MISS ANY DOSEs or STOP you Aspirin/Plavix, because these drugs helps to keep your sten open, unless instructed to do so by your cardiologist.   No heavy lifting, strenuous activity, bending, straining, or unnecessary stair climbing for 2 weeks. No driving for 2 days. You may shower 24 hours following the procedure but avoid baths/swimming for 1 week. Check your groin site for bleeding and/or swelling daily following procedure and call your doctor immediately if it occurs or if you experience increased pain at the site. Follow up with your cardiologist in 1-2 weeks. You may call [facility] if you have any questions/concerns regarding your procedure [phone].   Lifestyle modification: include healthy habits to prevent stroke and future CAD  Low salt, low fat diet.   Weight management.   Take medications as prescribed.    No smoking.  Follow up with your cardiologist or primary doctor in 1- 2 weeks. Your LDL cholesterol will be less than 70mg/dL Continue with your cholesterol medications. Eat a heart healthy diet that is low in saturated fats and salt, and includes whole grains, fruits, vegetables and lean protein; exercise regularly (consult with your physician or cardiologist first); maintain a heart healthy weight; if you smoke - quit (A resource to help you stop smoking is the [facility] Tobacco [facility] – phone number [phone].). Continue to follow with your primary physician or cardiologist. Your blood pressure will be controlled. Continue with your blood pressure medications; eat a heart healthy diet with low salt diet; exercise regularly (consult with your physician or cardiologist first); maintain a heart healthy weight; if you smoke - quit (A resource to help you stop smoking is the [facility] for Tobacco Control – phone number [phone].); include healthy ways to manage stress. Continue to follow with your primary care physician or cardiologist. BP medication dosage decreased as discussed with Dr Reid given that pt was experiencing hypotension.  please follow up with Dr Santiago for any further instruction and to cont monitoring your blood pressure

## 2018-04-17 NOTE — DISCHARGE NOTE ADULT - PATIENT PORTAL LINK FT
You can access the [facility] Patient Portal, offered by [facility], by registering with the following website: [email or web address]

## 2018-04-17 NOTE — DISCHARGE NOTE ADULT - PROVIDER TOKENS
DELMI:'[id number]' TOKEN:'[id number]',TOKEN:'[id number]' TOKEN:'[id number]',TOKEN:'[id number]'

## 2018-04-17 NOTE — CHART NOTE - NSCHARTNOTEFT_GEN_A_CORE
Removal of Femoral Sheath    Pulses in the (left) lower extremity are (palpable). The patient was placed in the supine position. The insertion site was identified and the sutures were removed per protocol.  The 6 [place] femoral sheath was then removed. Direct pressure was applied for  _20_ minutes.     Groining cath site: no evidence of bleeding, hematoma, and both PT and DP pulses are palpable.     Monitoring of the (left) groin and both lower extremities including neuro-vascular checks and vital signs every 15 minutes x 4, then every 30 minutes x 2, then every 1 hour was ordered.    Complications: none.    Comments:    Addendum:

## 2018-04-17 NOTE — DISCHARGE NOTE ADULT - CARE PLAN
Principal Discharge DX:	Coronary artery disease due to lipid rich plaque  Goal:	Pt remains chest pain free and understands post cath discharge instructions  Assessment and plan of treatment:	Do not MISS ANY DOSEs or STOP you Aspirin/Plavix, because these drugs helps to keep your sten open, unless instructed to do so by your cardiologist.   No heavy lifting, strenuous activity, bending, straining, or unnecessary stair climbing for 2 weeks. No driving for 2 days. You may shower 24 hours following the procedure but avoid baths/swimming for 1 week. Check your groin site for bleeding and/or swelling daily following procedure and call your doctor immediately if it occurs or if you experience increased pain at the site. Follow up with your cardiologist in 1-2 weeks. You may call [facility] if you have any questions/concerns regarding your procedure [phone].   Lifestyle modification: include healthy habits to prevent stroke and future CAD  Low salt, low fat diet.   Weight management.   Take medications as prescribed.    No smoking.  Follow up with your cardiologist or primary doctor in 1- 2 weeks.  Secondary Diagnosis:	Mixed hyperlipidemia  Goal:	Your LDL cholesterol will be less than 70mg/dL  Assessment and plan of treatment:	Continue with your cholesterol medications. Eat a heart healthy diet that is low in saturated fats and salt, and includes whole grains, fruits, vegetables and lean protein; exercise regularly (consult with your physician or cardiologist first); maintain a heart healthy weight; if you smoke - quit (A resource to help you stop smoking is the [facility] [employer] – phone number [phone].). Continue to follow with your primary physician or cardiologist.  Secondary Diagnosis:	Essential hypertension  Goal:	Your blood pressure will be controlled.  Assessment and plan of treatment:	Continue with your blood pressure medications; eat a heart healthy diet with low salt diet; exercise regularly (consult with your physician or cardiologist first); maintain a heart healthy weight; if you smoke - quit (A resource to help you stop smoking is the [facility] for Tobacco Control – phone number [phone].); include healthy ways to manage stress. Continue to follow with your primary care physician or cardiologist. Principal Discharge DX:	Coronary artery disease due to lipid rich plaque  Goal:	Pt remains chest pain free and understands post cath discharge instructions  Assessment and plan of treatment:	Do not MISS ANY DOSEs or STOP you Aspirin/Plavix, because these drugs helps to keep your sten open, unless instructed to do so by your cardiologist.   No heavy lifting, strenuous activity, bending, straining, or unnecessary stair climbing for 2 weeks. No driving for 2 days. You may shower 24 hours following the procedure but avoid baths/swimming for 1 week. Check your groin site for bleeding and/or swelling daily following procedure and call your doctor immediately if it occurs or if you experience increased pain at the site. Follow up with your cardiologist in 1-2 weeks. You may call [facility] if you have any questions/concerns regarding your procedure [phone].   Lifestyle modification: include healthy habits to prevent stroke and future CAD  Low salt, low fat diet.   Weight management.   Take medications as prescribed.    No smoking.  Follow up with your cardiologist or primary doctor in 1- 2 weeks.  Secondary Diagnosis:	Mixed hyperlipidemia  Goal:	Your LDL cholesterol will be less than 70mg/dL  Assessment and plan of treatment:	Continue with your cholesterol medications. Eat a heart healthy diet that is low in saturated fats and salt, and includes whole grains, fruits, vegetables and lean protein; exercise regularly (consult with your physician or cardiologist first); maintain a heart healthy weight; if you smoke - quit (A resource to help you stop smoking is the [facility] [employer] for Tobacco Control – phone number [phone].). Continue to follow with your primary physician or cardiologist.  Secondary Diagnosis:	Essential hypertension  Goal:	Your blood pressure will be controlled.  Assessment and plan of treatment:	Continue with your blood pressure medications; eat a heart healthy diet with low salt diet; exercise regularly (consult with your physician or cardiologist first); maintain a heart healthy weight; if you smoke - quit (A resource to help you stop smoking is the [facility] for Tobacco Control – phone number [phone].); include healthy ways to manage stress. Continue to follow with your primary care physician or cardiologist.  Assessment and plan of treatment:	BP medication dosage decreased as discussed with Dr Reid given that pt was experiencing hypotension.  please follow up with Dr Santiago for any further instruction and to cont monitoring your blood pressure

## 2018-04-17 NOTE — DISCHARGE NOTE ADULT - MEDICATION SUMMARY - MEDICATIONS TO CHANGE
I will SWITCH the dose or number of times a day I take the medications listed below when I get home from the hospital:  None I will SWITCH the dose or number of times a day I take the medications listed below when I get home from the hospital:    amLODIPine 5 mg oral tablet  -- 1 tab(s) by mouth once a day in pm  home/hosp    olmesartan 20 mg oral tablet  -- 1 tab(s) by mouth once a day in pm  home

## 2018-04-17 NOTE — DISCHARGE NOTE ADULT - MEDICATION SUMMARY - MEDICATIONS TO STOP TAKING
I will STOP taking the medications listed below when I get home from the hospital:  None I will STOP taking the medications listed below when I get home from the hospital:    metoprolol tartrate 25 mg oral tablet  -- 0.5 tab(s) by mouth 2 times a day MDD:1tab I will STOP taking the medications listed below when I get home from the hospital:    amLODIPine 5 mg oral tablet  -- 1 tab(s) by mouth once a day in pm  home/hosp    olmesartan 20 mg oral tablet  -- 1 tab(s) by mouth once a day in pm  home    predniSONE 5 mg oral tablet  -- 1 tab(s) by mouth once a day in am  home    metoprolol tartrate 25 mg oral tablet  -- 0.5 tab(s) by mouth 2 times a day MDD:1tab

## 2018-04-17 NOTE — DISCHARGE NOTE ADULT - ABILITY TO HEAR (WITH HEARING AID OR HEARING APPLIANCE IF NORMALLY USED):
Mildly to Moderately Impaired: difficulty hearing in some environments or speaker may need to increase volume or speak distinctly/b/l HA

## 2018-04-18 ENCOUNTER — APPOINTMENT (OUTPATIENT)
Dept: PULMONOLOGY | Facility: CLINIC | Age: 83
End: 2018-04-18

## 2018-04-18 LAB
ANION GAP SERPL CALC-SCNC: 11 MMOL/L — SIGNIFICANT CHANGE UP (ref 5–17)
BUN SERPL-MCNC: 15 MG/DL — SIGNIFICANT CHANGE UP (ref 7–23)
CALCIUM SERPL-MCNC: 9 MG/DL — SIGNIFICANT CHANGE UP (ref 8.4–10.5)
CHLORIDE SERPL-SCNC: 102 MMOL/L — SIGNIFICANT CHANGE UP (ref 96–108)
CO2 SERPL-SCNC: 23 MMOL/L — SIGNIFICANT CHANGE UP (ref 22–31)
CREAT SERPL-MCNC: 0.61 MG/DL — SIGNIFICANT CHANGE UP (ref 0.5–1.3)
GLUCOSE SERPL-MCNC: 85 MG/DL — SIGNIFICANT CHANGE UP (ref 70–99)
HCT VFR BLD CALC: 25.7 % — LOW (ref 39–50)
HGB BLD-MCNC: 8.8 G/DL — LOW (ref 13–17)
MCHC RBC-ENTMCNC: 32.7 PG — SIGNIFICANT CHANGE UP (ref 27–34)
MCHC RBC-ENTMCNC: 34.1 GM/DL — SIGNIFICANT CHANGE UP (ref 32–36)
MCV RBC AUTO: 95.9 FL — SIGNIFICANT CHANGE UP (ref 80–100)
PLATELET # BLD AUTO: 171 K/UL — SIGNIFICANT CHANGE UP (ref 150–400)
POTASSIUM SERPL-MCNC: 4.3 MMOL/L — SIGNIFICANT CHANGE UP (ref 3.5–5.3)
POTASSIUM SERPL-SCNC: 4.3 MMOL/L — SIGNIFICANT CHANGE UP (ref 3.5–5.3)
RBC # BLD: 2.68 M/UL — LOW (ref 4.2–5.8)
RBC # FLD: 15.1 % — HIGH (ref 10.3–14.5)
SODIUM SERPL-SCNC: 136 MMOL/L — SIGNIFICANT CHANGE UP (ref 135–145)
WBC # BLD: 4 K/UL — SIGNIFICANT CHANGE UP (ref 3.8–10.5)
WBC # FLD AUTO: 4 K/UL — SIGNIFICANT CHANGE UP (ref 3.8–10.5)

## 2018-04-18 PROCEDURE — 71045 X-RAY EXAM CHEST 1 VIEW: CPT | Mod: 26

## 2018-04-18 RX ORDER — AMLODIPINE BESYLATE 2.5 MG/1
1 TABLET ORAL
Qty: 0 | Refills: 0 | COMMUNITY

## 2018-04-18 RX ORDER — MIDODRINE HYDROCHLORIDE 2.5 MG/1
5 TABLET ORAL THREE TIMES A DAY
Qty: 0 | Refills: 0 | Status: DISCONTINUED | OUTPATIENT
Start: 2018-04-18 | End: 2018-04-20

## 2018-04-18 RX ORDER — AMLODIPINE BESYLATE 2.5 MG/1
1 TABLET ORAL
Qty: 30 | Refills: 0 | OUTPATIENT
Start: 2018-04-18 | End: 2018-05-17

## 2018-04-18 RX ORDER — SODIUM CHLORIDE 9 MG/ML
250 INJECTION INTRAMUSCULAR; INTRAVENOUS; SUBCUTANEOUS ONCE
Qty: 0 | Refills: 0 | Status: COMPLETED | OUTPATIENT
Start: 2018-04-18 | End: 2018-04-18

## 2018-04-18 RX ORDER — LOSARTAN POTASSIUM 100 MG/1
25 TABLET, FILM COATED ORAL DAILY
Qty: 0 | Refills: 0 | Status: DISCONTINUED | OUTPATIENT
Start: 2018-04-18 | End: 2018-04-18

## 2018-04-18 RX ORDER — AMLODIPINE BESYLATE 2.5 MG/1
2.5 TABLET ORAL DAILY
Qty: 0 | Refills: 0 | Status: DISCONTINUED | OUTPATIENT
Start: 2018-04-18 | End: 2018-04-18

## 2018-04-18 RX ORDER — METOPROLOL TARTRATE 50 MG
12.5 TABLET ORAL DAILY
Qty: 0 | Refills: 0 | Status: DISCONTINUED | OUTPATIENT
Start: 2018-04-18 | End: 2018-04-18

## 2018-04-18 RX ORDER — HYDROCORTISONE 20 MG
50 TABLET ORAL THREE TIMES A DAY
Qty: 0 | Refills: 0 | Status: DISCONTINUED | OUTPATIENT
Start: 2018-04-18 | End: 2018-04-20

## 2018-04-18 RX ORDER — METOPROLOL TARTRATE 50 MG
0.5 TABLET ORAL
Qty: 15 | Refills: 0 | OUTPATIENT
Start: 2018-04-18 | End: 2018-05-17

## 2018-04-18 RX ORDER — OLMESARTAN MEDOXOMIL 5 MG/1
1 TABLET, FILM COATED ORAL
Qty: 0 | Refills: 0 | COMMUNITY

## 2018-04-18 RX ORDER — FUROSEMIDE 40 MG
40 TABLET ORAL ONCE
Qty: 0 | Refills: 0 | Status: COMPLETED | OUTPATIENT
Start: 2018-04-18 | End: 2018-04-18

## 2018-04-18 RX ORDER — OLMESARTAN MEDOXOMIL 5 MG/1
2 TABLET, FILM COATED ORAL
Qty: 60 | Refills: 0 | OUTPATIENT
Start: 2018-04-18 | End: 2018-05-17

## 2018-04-18 RX ADMIN — Medication 200 MILLIGRAM(S): at 05:37

## 2018-04-18 RX ADMIN — SODIUM CHLORIDE 3 MILLILITER(S): 9 INJECTION INTRAMUSCULAR; INTRAVENOUS; SUBCUTANEOUS at 11:02

## 2018-04-18 RX ADMIN — SODIUM CHLORIDE 3 MILLILITER(S): 9 INJECTION INTRAMUSCULAR; INTRAVENOUS; SUBCUTANEOUS at 22:07

## 2018-04-18 RX ADMIN — Medication 5 MILLIGRAM(S): at 05:37

## 2018-04-18 RX ADMIN — SODIUM CHLORIDE 500 MILLILITER(S): 9 INJECTION INTRAMUSCULAR; INTRAVENOUS; SUBCUTANEOUS at 12:06

## 2018-04-18 RX ADMIN — AMLODIPINE BESYLATE 5 MILLIGRAM(S): 2.5 TABLET ORAL at 05:38

## 2018-04-18 RX ADMIN — Medication 200 MILLIGRAM(S): at 18:05

## 2018-04-18 RX ADMIN — MIDODRINE HYDROCHLORIDE 5 MILLIGRAM(S): 2.5 TABLET ORAL at 21:45

## 2018-04-18 RX ADMIN — Medication 40 MILLIGRAM(S): at 12:06

## 2018-04-18 RX ADMIN — SODIUM CHLORIDE 3 MILLILITER(S): 9 INJECTION INTRAMUSCULAR; INTRAVENOUS; SUBCUTANEOUS at 05:42

## 2018-04-18 RX ADMIN — SIMVASTATIN 20 MILLIGRAM(S): 20 TABLET, FILM COATED ORAL at 21:45

## 2018-04-18 RX ADMIN — Medication 12.5 MILLIGRAM(S): at 05:37

## 2018-04-18 RX ADMIN — Medication 1 MILLIGRAM(S): at 12:06

## 2018-04-18 RX ADMIN — MONTELUKAST 10 MILLIGRAM(S): 4 TABLET, CHEWABLE ORAL at 12:06

## 2018-04-18 RX ADMIN — Medication 81 MILLIGRAM(S): at 05:37

## 2018-04-18 RX ADMIN — Medication 0.5 MILLIGRAM(S): at 05:38

## 2018-04-18 RX ADMIN — ALBUTEROL 2 PUFF(S): 90 AEROSOL, METERED ORAL at 22:53

## 2018-04-18 RX ADMIN — Medication 50 MILLIGRAM(S): at 18:05

## 2018-04-18 RX ADMIN — FINASTERIDE 5 MILLIGRAM(S): 5 TABLET, FILM COATED ORAL at 12:06

## 2018-04-18 RX ADMIN — CLOPIDOGREL BISULFATE 75 MILLIGRAM(S): 75 TABLET, FILM COATED ORAL at 05:37

## 2018-04-18 RX ADMIN — Medication 0.5 MILLIGRAM(S): at 18:05

## 2018-04-18 NOTE — CHART NOTE - NSCHARTNOTEFT_GEN_A_CORE
Patient is s/p cath on 4/17 with Md Reid   awaiting for PT eval today for dc home vs rehab ( depending on evaluation)   - Found to have soft bp's over night and this morning, asymptomatic, w/ negative orthostatics, ECHO on 9/22/17 with mild valve findings and normal LV function.  BP somewhat  responsive to 250cc IV X1, however pt developed mild sob and bl lower lobes rales right after fluid administration requiring Lasix 40mg IV X1 for diuresis   - BP meds decreased: Metoprolol Tartrate 12.5mg PO bid changed to Metoprolol Succinate 12.5mg po daily, Norvasc decreased from 5 to 2.5mg PO daily, and home Olmesartan changed from 20 to 10mg PO daily. ( new scripts sent to pharmacy on records, pt and wife both made aware, educated, and verbalize and give positive feedback; further instructions to be given by primary nurse Brianna on discharge)   - Tried to call PCP/cards dr Jack Yadav 3 times,  to make him aware of patient's status and meds change, however each time place on hold for over 15 min and never got to speak to Md.  - Dr Reid called, discussed with him patient's status ( asymptomatic), soft Bp's, and change in antihypertensive regimen; Dr Reid agrees with all interventions take and agree that as long as pt is asymptomatic, he is okay to be discharged.  - Current BP 95/68 HR 62, pt remains asymptomatic denies: dizziness, palpitations, chest pain, or shortness of breath ( no furthers rales/crackles appreciate on lung ascultation after Lasix administration) .  Wife at bed side.    - As per pt recommendation, pt needs sub acute rehab however pt/family refusing but agreed to go home with home pt and nursing assistance for ADLs,   - Myrna,  aware, currently setting up for dc   - As per cardiology stand point, after talking to Dr Reid, pt ok to be discharged  - dc in chart Patient is s/p cath on 4/17 with Md Reid   awaiting for PT eval today for dc home vs rehab ( depending on evaluation)   - Found to have soft bp's over night and this morning, asymptomatic, w/ negative orthostatics, ECHO on 9/22/17 with mild valve findings and normal LV function.  BP somewhat  responsive to 250cc IV X1, however pt developed mild sob and bl lower lobes rales right after fluid administration requiring Lasix 40mg IV X1 for diuresis   - BP meds decreased: Metoprolol Tartrate 12.5mg PO bid changed to Metoprolol Succinate 12.5mg po daily, Norvasc decreased from 5 to 2.5mg PO daily, and home Olmesartan changed from 20 to 10mg PO daily. ( new scripts sent to pharmacy on records, pt and wife both made aware, educated, and verbalize and give positive feedback; further instructions to be given by primary nurse Brianna on discharge)   - Tried to call PCP/cards dr Jack Yadav 3 times,  to make him aware of patient's status and meds change, however each time place on hold for over 15 min and never got to speak to Md.  - Dr Reid called, discussed with him patient's status ( asymptomatic), soft Bp's, and change in antihypertensive regimen; Dr Reid agrees with all interventions take and agree that as long as pt is asymptomatic, he is okay to be discharged.  - Current BP 95/68 HR 62, pt remains asymptomatic denies: dizziness, palpitations, chest pain, or shortness of breath ( no furthers rales/crackles appreciate on lung ascultation after Lasix administration) .  Wife at bed side.    - As per pt recommendation, pt needs sub acute rehab however pt/family refusing but agreed to go home with home pt and nursing assistance for ADLs,   - Myrna,  aware, currently setting up for dc   - As per cardiology stand point, after talking to Dr Reid, pt ok to be discharged  - dc order in chart  - pt to follow up with Md Santiago in 1 week Patient is s/p cath on 4/17 with Md Reid   awaiting for PT eval today for dc home vs rehab ( depending on evaluation)   - Found to have soft bp's over night and this morning, asymptomatic, w/ negative orthostatics, ECHO on 9/22/17 with mild valve findings and normal LV function.  BP somewhat  responsive to 250cc IV X1, however pt developed mild sob and bl lower lobes rales right after fluid administration requiring Lasix 40mg IV X1 for diuresis   - BP meds decreased: Metoprolol Tartrate 12.5mg PO bid changed to Metoprolol Succinate 12.5mg po daily, Norvasc decreased from 5 to 2.5mg PO daily, and home Olmesartan changed from 20 to 10mg PO daily. ( new scripts sent to pharmacy on records, pt and wife both made aware, educated, and verbalize and give positive feedback; further instructions to be given by primary nurse Brianna on discharge)   - Tried to call PCP/cards dr Jack Yadav 3 times,  to make him aware of patient's status and meds change, however each time place on hold for over 15 min and never got to speak to Md.  - Dr Reid called, discussed with him patient's status ( asymptomatic), soft Bp's, and change in antihypertensive regimen; Dr Reid agrees with all interventions take and agree that as long as pt is asymptomatic, he is okay to be discharged.  - Current BP 95/68 HR 62, pt remains asymptomatic denies: dizziness, palpitations, chest pain, or shortness of breath ( no furthers rales/crackles appreciate on lung ascultation after Lasix administration) .  Wife at bed side.    - As per pt recommendation, pt needs sub acute rehab however pt/family refusing but agreed to go home with home pt and nursing assistance for ADLs,   - Myrna,  aware, currently setting up for dc   - As per cardiology stand point, after talking to Dr Reid, pt ok to be discharged  - dc order in chart  - pt to follow up with Md Santiago in 1 week      ADDENDUM to above note  - SBP 70-80's, unable to dc home at this time, spoke to Dr Reid he agrees to keep patient  - ECHO ordered  - got in contact with dr Jack Yadav- cards consult from him pending  - Dr Singh called for medicine  - Midodrine 5mg PO TId started   Dr Singh dc Norvasc and Losartan  will cont to monitor

## 2018-04-18 NOTE — PHYSICAL THERAPY INITIAL EVALUATION ADULT - ADDITIONAL COMMENTS
as per pt and wife at bedside, resides in an elevated APT with no DEE, PTA, pt amb (I) with RW, distance limited to 75ft, required assist from spouse for some of the ADLs, goes to outpatient pulm. rehab 3x a week, no HHA

## 2018-04-18 NOTE — CONSULT NOTE ADULT - ASSESSMENT
pt  with  h/o htn, cad, stents, br  asthma, seizure, cva admitted  with cp   s./p cayh  normal; ef   now with anemia  and sob      < from: Cardiac Cath Lab - Adult (04.17.18 @ 15:55) >    [facility] - Interventional.  --  Intervention on mid LAD: drug-eluting stent.  --  Intervention on D1: balloon angioplasty.    < end of copied text > pt  with  h/o htn, cad, stents, br  asthma, seizure, cva admitted  with cp   s./p cath,  now  with rico  normal; ef   now with anemia  ,  stable  s/p  chf  this  am, needing lasix   cxr portable, now  awaiting echo    labs  in am  spoke  with  wife      < from: Cardiac Cath Lab - Adult (04.17.18 @ 15:55) >    [facility] - Interventional.  --  Intervention on mid LAD: drug-eluting stent.  --  Intervention on D1: balloon angioplasty.    < end of copied text > pt  with  h/o htn, cad, stents, br  asthma, seizure, cva admitted  with cp   s./p cath,  now  with rico  normal; ef   now with anemia  ,  stable  s/p  chf  this  am, needing lasix   cxr portable, now  awaiting echo    labs  in am  on c/c . prednisone,, for  RA,    now  with low  bp, will give stress dose  hydrocortisone, tid  spoke  with  wife      < from: Cardiac Cath Lab - Adult (04.17.18 @ 15:55) >    [facility] - Interventional.  --  Intervention on mid LAD: drug-eluting stent.  --  Intervention on D1: balloon angioplasty.    < end of copied text >

## 2018-04-18 NOTE — PHYSICAL THERAPY INITIAL EVALUATION ADULT - DISCHARGE DISPOSITION, PT EVAL
Subacute Rehab, however, pt refused and preferred home, if home, Home with Home PT for strengthening, bed mob, transfer, gait and balance training, home with assist for all functional mobility and ADLs/rehabilitation facility

## 2018-04-18 NOTE — PROGRESS NOTE ADULT - SUBJECTIVE AND OBJECTIVE BOX
Subjective/Objective:  Patient is a 82y old  Male who presents with a chief complaint of cp (17 Apr 2018 20:42)    Allergies    penicillin (Rash)    Intolerances      Medications:  ALBUTerol    90 MICROgram(s) HFA Inhaler 2 Puff(s) Inhalation every 6 hours PRN  ALPRAZolam 0.5 milliGRAM(s) Oral two times a day  amLODIPine   Tablet 5 milliGRAM(s) Oral daily  aspirin enteric coated 81 milliGRAM(s) Oral daily  clopidogrel Tablet 75 milliGRAM(s) Oral daily  finasteride 5 milliGRAM(s) Oral daily  folic acid 1 milliGRAM(s) Oral daily  losartan 50 milliGRAM(s) Oral daily  metoprolol tartrate 12.5 milliGRAM(s) Oral two times a day  montelukast 10 milliGRAM(s) Oral daily  phenytoin   Capsule 200 milliGRAM(s) Oral two times a day  predniSONE   Tablet 5 milliGRAM(s) Oral daily  simvastatin 20 milliGRAM(s) Oral at bedtime  sodium chloride 0.9% lock flush 3 milliLiter(s) IV Push every 8 hours      Review of Systems:   No c/o chest pain or SOB, and all others negative.    Vitals:  T(C): 35.6 (04-17-18 @ 20:05), Max: 36.5 (04-17-18 @ 10:06)  HR: 71 (04-18-18 @ 05:33) (58 - 72)  BP: 103/59 (04-18-18 @ 05:33) (91/43 - 143/68)  BP(mean): --  RR: 17 (04-18-18 @ 05:33) (16 - 18)  SpO2: 98% (04-18-18 @ 05:33) (96% - 100%)  Wt(kg): --  Daily     Daily   I&O's Summary    16 Apr 2018 07:01  -  17 Apr 2018 07:00  --------------------------------------------------------  IN: 500 mL / OUT: 450 mL / NET: 50 mL    17 Apr 2018 07:01  -  18 Apr 2018 06:09  --------------------------------------------------------  IN: 1080 mL / OUT: 400 mL / NET: 680 mL      Physical Exam:  Appearance: Pt in NAD, non-toxic  Cardiovascular: S1 S2  Cath Site: No evidence of bleeding or hematoma, Non-tender to palpation, 2+ distal pulses  Respiratory: Clear to auscultation bilaterally  Gastrointestinal: Soft, NT/ND, Bowel Sounds +  Neurologic: Non-focal                          8.8    4.0   )-----------( 171      ( 18 Apr 2018 01:01 )             25.7     04-18    136  |  102  |  15  ----------------------------<  85  4.3   |  23  |  0.61    Ca    9.0      18 Apr 2018 01:01    TPro  7.4  /  Alb  2.8<L>  /  TBili  0.3  /  DBili  x   /  AST  69<H>  /  ALT  55<H>  /  AlkPhos  295<H>  04-16            Lipid panel   Hgb A1c       Interpretation of Telemetry: SB/SR at HR 55-65 with 1st degree AVB.  No special event over night.    Assessment/Plan:   S/P PCI: ROJAS x2 to LAD via Lt groin. Pt tolerated procedure well and overnight remained uneventful. No c/o chest pain or SOB. Pt is hemodynamically stable, EKG and all lab results reviewed. Noted still low H/H, but at baseline. Insertion/incision site benign, no bleeding or hematoma, and cath site dressing changed. Discharge teaching provided to Pt/caretaker and verbalized understanding the instruction.   F/U with cardiologist in 1-2 weeks.  Plan to have PT eval for HHA, then may d/c home if stable.  cont assess and monitor.

## 2018-04-18 NOTE — CONSULT NOTE ADULT - SUBJECTIVE AND OBJECTIVE BOX
History of Present Illness		  82 year old  male pt with PMHX of CAD with stents x5,   esophageal cancer s/p esophagectomy, HTN, HLD, anxiety, arthritis, recent shingles, asthma presents for cardiac cath.   Pt reports he had an episode of chest pain(mid sternal chest pressure radiated to right arm, lasted about an hour on 4/11), went to [facility] ER and released after having some blood works and EKG. Then pt had f/u visit with Leif Santiago this morning then referred for cath. pt denies chest pain since ER visit.        Past Medical History:  Anxiety    Asthma    BCC (basal cell carcinoma)  skin  BPH (benign prostatic hyperplasia)    Cerebrovascular accident (CVA)    Chronic allergic rhinitis    Coronary artery disease  s/p 3 stents on June 30, 2017 at [facility] Lavelle [facility]  Esophagus cancer    Former smoker, stopped smoking in distant past    HTN (hypertension)    Hyperlipidemia    OA (osteoarthritis) of knee  right  Rheumatoid arthritis    Seizure disorder  1 episode approximately 15 yrs ago.     Past Surgical History:  Anastomotic leak following esophagectomy  Vance logan esophagectomy  H/O heart artery stent  June 30, 2017  History of hernia repair    History of tonsillectomy    History of total hip replacement  left  Knee arthropathy  left.    REVIEW OF SYSTEMS:  CONSTITUTIONAL: No weakness,  no   fevers      RESPIRATORY:  No    shortness of breath  CARDIOVASCULAR:  had chest pain,  a nd sob  GASTROINTESTINAL: No abdominal  pain  NEUROLOGICAL: No  focal  weakness  PHYSICAL EXAMINATION:  Vital Signs Last 24 Hrs  T(C): 36.3 (18 Apr 2018 09:37), Max: 36.8 (18 Apr 2018 05:33)  T(F): 97.3 (18 Apr 2018 09:37), Max: 98.3 (18 Apr 2018 05:33)  HR: 66 (18 Apr 2018 14:06) (58 - 72)  BP: 74/34 (18 Apr 2018 14:07) (74/31 - 143/68)  BP(mean): --  RR: 17 (18 Apr 2018 14:06) (16 - 18)  SpO2: 98% (18 Apr 2018 14:06) (96% - 100%)  CAPILLARY BLOOD GLUCOSE          04-17 @ 07:01  -  04-18 @ 07:00  --------------------------------------------------------  IN: 1080 mL / OUT: 800 mL / NET: 280 mL    04-18 @ 07:01  -  04-18 @ 16:55  --------------------------------------------------------  IN: 440 mL / OUT: 100 mL / NET: 340 mL        GENERAL: NAD, well-groomed,  HEAD:  atraumatic, normocephalic  EYES: sclera anicteric  ENMT: mucous membranes moist  NECK: supple, No JVD  CHEST/LUNG: clear to auscultation bilaterally;    no      rales   ,   no rhonchi,   HEART: normal S1, S2  ABDOMEN: BS+, soft, ND, NT   EXTREMITIES:    no    edema    b/l LEs  NEURO: awake, ,     moves all extremities  SKIN: no     rash      LABS:                        8.8    4.0   )-----------( 171      ( 18 Apr 2018 01:01 )             25.7     04-18    136  |  102  |  15  ----------------------------<  85  4.3   |  23  |  0.61    Ca    9.0      18 Apr 2018 01:01

## 2018-04-18 NOTE — PHYSICAL THERAPY INITIAL EVALUATION ADULT - PERTINENT HX OF CURRENT PROBLEM, REHAB EVAL
82yoM with CAD, esophageal cancer, anxiety, recent shingles, p/w chest pain, now s/p above procedure, CXR 4/16, R BBB, L anterior fascicular block

## 2018-04-19 LAB
ALBUMIN SERPL ELPH-MCNC: 2.4 G/DL — LOW (ref 3.3–5)
ALP SERPL-CCNC: 242 U/L — HIGH (ref 40–120)
ALT FLD-CCNC: 36 U/L — SIGNIFICANT CHANGE UP (ref 10–45)
ANION GAP SERPL CALC-SCNC: 8 MMOL/L — SIGNIFICANT CHANGE UP (ref 5–17)
AST SERPL-CCNC: 35 U/L — SIGNIFICANT CHANGE UP (ref 10–40)
BILIRUB DIRECT SERPL-MCNC: <0.1 MG/DL — SIGNIFICANT CHANGE UP (ref 0–0.2)
BILIRUB INDIRECT FLD-MCNC: >0.1 MG/DL — LOW (ref 0.2–1)
BILIRUB SERPL-MCNC: 0.2 MG/DL — SIGNIFICANT CHANGE UP (ref 0.2–1.2)
BUN SERPL-MCNC: 23 MG/DL — SIGNIFICANT CHANGE UP (ref 7–23)
CALCIUM SERPL-MCNC: 8.9 MG/DL — SIGNIFICANT CHANGE UP (ref 8.4–10.5)
CHLORIDE SERPL-SCNC: 105 MMOL/L — SIGNIFICANT CHANGE UP (ref 96–108)
CO2 SERPL-SCNC: 25 MMOL/L — SIGNIFICANT CHANGE UP (ref 22–31)
CREAT SERPL-MCNC: 0.75 MG/DL — SIGNIFICANT CHANGE UP (ref 0.5–1.3)
GLUCOSE BLDC GLUCOMTR-MCNC: 127 MG/DL — HIGH (ref 70–99)
GLUCOSE BLDC GLUCOMTR-MCNC: 138 MG/DL — HIGH (ref 70–99)
GLUCOSE BLDC GLUCOMTR-MCNC: 93 MG/DL — SIGNIFICANT CHANGE UP (ref 70–99)
GLUCOSE BLDC GLUCOMTR-MCNC: 96 MG/DL — SIGNIFICANT CHANGE UP (ref 70–99)
GLUCOSE BLDC GLUCOMTR-MCNC: 98 MG/DL — SIGNIFICANT CHANGE UP (ref 70–99)
GLUCOSE SERPL-MCNC: 69 MG/DL — LOW (ref 70–99)
HCT VFR BLD CALC: 23.4 % — LOW (ref 39–50)
HGB BLD-MCNC: 7.8 G/DL — LOW (ref 13–17)
MAGNESIUM SERPL-MCNC: 1.9 MG/DL — SIGNIFICANT CHANGE UP (ref 1.6–2.6)
MCHC RBC-ENTMCNC: 31.9 PG — SIGNIFICANT CHANGE UP (ref 27–34)
MCHC RBC-ENTMCNC: 33.1 GM/DL — SIGNIFICANT CHANGE UP (ref 32–36)
MCV RBC AUTO: 96.3 FL — SIGNIFICANT CHANGE UP (ref 80–100)
PLATELET # BLD AUTO: 165 K/UL — SIGNIFICANT CHANGE UP (ref 150–400)
POTASSIUM SERPL-MCNC: 4 MMOL/L — SIGNIFICANT CHANGE UP (ref 3.5–5.3)
POTASSIUM SERPL-SCNC: 4 MMOL/L — SIGNIFICANT CHANGE UP (ref 3.5–5.3)
PROT SERPL-MCNC: 6.4 G/DL — SIGNIFICANT CHANGE UP (ref 6–8.3)
RBC # BLD: 2.43 M/UL — LOW (ref 4.2–5.8)
RBC # FLD: 15 % — HIGH (ref 10.3–14.5)
SODIUM SERPL-SCNC: 138 MMOL/L — SIGNIFICANT CHANGE UP (ref 135–145)
WBC # BLD: 4.3 K/UL — SIGNIFICANT CHANGE UP (ref 3.8–10.5)
WBC # FLD AUTO: 4.3 K/UL — SIGNIFICANT CHANGE UP (ref 3.8–10.5)

## 2018-04-19 PROCEDURE — 76700 US EXAM ABDOM COMPLETE: CPT | Mod: 26

## 2018-04-19 RX ORDER — DEXTROSE 50 % IN WATER 50 %
12.5 SYRINGE (ML) INTRAVENOUS ONCE
Qty: 0 | Refills: 0 | Status: DISCONTINUED | OUTPATIENT
Start: 2018-04-19 | End: 2018-04-19

## 2018-04-19 RX ORDER — DEXTROSE 50 % IN WATER 50 %
25 SYRINGE (ML) INTRAVENOUS ONCE
Qty: 0 | Refills: 0 | Status: COMPLETED | OUTPATIENT
Start: 2018-04-19 | End: 2018-04-19

## 2018-04-19 RX ORDER — DEXTROSE 50 % IN WATER 50 %
25 SYRINGE (ML) INTRAVENOUS ONCE
Qty: 0 | Refills: 0 | Status: DISCONTINUED | OUTPATIENT
Start: 2018-04-19 | End: 2018-04-20

## 2018-04-19 RX ORDER — GLUCAGON INJECTION, SOLUTION 0.5 MG/.1ML
1 INJECTION, SOLUTION SUBCUTANEOUS ONCE
Qty: 0 | Refills: 0 | Status: DISCONTINUED | OUTPATIENT
Start: 2018-04-19 | End: 2018-04-20

## 2018-04-19 RX ORDER — SODIUM CHLORIDE 9 MG/ML
1000 INJECTION, SOLUTION INTRAVENOUS
Qty: 0 | Refills: 0 | Status: DISCONTINUED | OUTPATIENT
Start: 2018-04-19 | End: 2018-04-19

## 2018-04-19 RX ORDER — DEXTROSE 50 % IN WATER 50 %
1 SYRINGE (ML) INTRAVENOUS ONCE
Qty: 0 | Refills: 0 | Status: DISCONTINUED | OUTPATIENT
Start: 2018-04-19 | End: 2018-04-20

## 2018-04-19 RX ORDER — DEXTROSE 50 % IN WATER 50 %
25 SYRINGE (ML) INTRAVENOUS ONCE
Qty: 0 | Refills: 0 | Status: DISCONTINUED | OUTPATIENT
Start: 2018-04-19 | End: 2018-04-19

## 2018-04-19 RX ADMIN — SODIUM CHLORIDE 3 MILLILITER(S): 9 INJECTION INTRAMUSCULAR; INTRAVENOUS; SUBCUTANEOUS at 06:07

## 2018-04-19 RX ADMIN — Medication 200 MILLIGRAM(S): at 18:41

## 2018-04-19 RX ADMIN — Medication 81 MILLIGRAM(S): at 05:51

## 2018-04-19 RX ADMIN — MIDODRINE HYDROCHLORIDE 5 MILLIGRAM(S): 2.5 TABLET ORAL at 05:51

## 2018-04-19 RX ADMIN — SODIUM CHLORIDE 3 MILLILITER(S): 9 INJECTION INTRAMUSCULAR; INTRAVENOUS; SUBCUTANEOUS at 18:24

## 2018-04-19 RX ADMIN — MIDODRINE HYDROCHLORIDE 5 MILLIGRAM(S): 2.5 TABLET ORAL at 18:26

## 2018-04-19 RX ADMIN — Medication 1 MILLIGRAM(S): at 18:29

## 2018-04-19 RX ADMIN — Medication 50 MILLIGRAM(S): at 18:25

## 2018-04-19 RX ADMIN — Medication 50 MILLIGRAM(S): at 05:51

## 2018-04-19 RX ADMIN — Medication 200 MILLIGRAM(S): at 05:51

## 2018-04-19 RX ADMIN — Medication 25 MILLILITER(S): at 09:20

## 2018-04-19 RX ADMIN — MONTELUKAST 10 MILLIGRAM(S): 4 TABLET, CHEWABLE ORAL at 18:25

## 2018-04-19 RX ADMIN — CLOPIDOGREL BISULFATE 75 MILLIGRAM(S): 75 TABLET, FILM COATED ORAL at 05:51

## 2018-04-19 RX ADMIN — SODIUM CHLORIDE 3 MILLILITER(S): 9 INJECTION INTRAMUSCULAR; INTRAVENOUS; SUBCUTANEOUS at 22:43

## 2018-04-19 RX ADMIN — Medication 0.5 MILLIGRAM(S): at 18:29

## 2018-04-19 RX ADMIN — FINASTERIDE 5 MILLIGRAM(S): 5 TABLET, FILM COATED ORAL at 18:29

## 2018-04-19 RX ADMIN — Medication 0.5 MILLIGRAM(S): at 05:51

## 2018-04-19 RX ADMIN — SIMVASTATIN 20 MILLIGRAM(S): 20 TABLET, FILM COATED ORAL at 22:43

## 2018-04-19 NOTE — PROGRESS NOTE ADULT - ASSESSMENT
pt  with  h/o htn, cad, stents, br  asthma, seizure, cva admitted  with cp   s./p cath,  now  with rico  normal; ef   now with anemia  ,   awaiting echo   on c/c . prednisone,, for  RA,    now  with low  bp,   on  stress dose  hydrocortisone, tid  hb of 7,.  prbc today. hb in am no melena  follow  fs  spoke  with  wife      < from: Cardiac Cath Lab - Adult (04.17.18 @ 15:55) >    [facility] - Interventional.  --  Intervention on mid LAD: drug-eluting stent.  --  Intervention on D1: balloon angioplasty.    < end of copied text > pt  with  h/o htn, cad, stents, br  asthma, seizure, cva admitted  with cp   s./p cath,  now  with rico  normal; ef   now with anemia  ,   awaiting echo   on c/c . prednisone,, for  RA,    now  with low  bp,   on  stress dose  hydrocortisone, tid  hb of 7,.  prbc today. hb in am no melena  follow  fs, pt  npo, glucose  in 60/s a nd then in 90's, to give  amp of d50, follow  fs  spoke  with  wife      < from: Cardiac Cath Lab - Adult (04.17.18 @ 15:55) >    [facility] - Interventional.  --  Intervention on mid LAD: drug-eluting stent.  --  Intervention on D1: balloon angioplasty.    < end of copied text >

## 2018-04-19 NOTE — CHART NOTE - NSCHARTNOTEFT_GEN_A_CORE
Hypotension- asymptomatic    patient sitting OOB in chair BP 78/38  patient denies dizziness, CP or SOB    A+O x 4  Pulm CTA   CV S1S2 rate 60-70  abd soft non tender   R groin - non tender, pea size firmness at site, surrounding area soft, no bleeding   L groin soft, non tender no bleed or hematoma    A/P     BP recheck 90/  after getting back to bed  BB/ARB stopped yesterday  continue hydrocortisone and midodrine  rpt CBC 7.8/23 today - trending down  seen by Attending Dr Covington and 1 unit of PRBC ordered   stool for occult blood   abdominal US ordered   transfer to 2 DSU - report given to medicine NP Nadine Hypotension- asymptomatic    patient sitting OOB in chair BP 78/37  patient denies dizziness, CP or SOB    A+O x 4  Pulm CTA   CV S1S2 rate 60-70  abd soft non tender   R groin - non tender, pea size firmness at site, surrounding area soft, no bleeding   L groin soft, non tender no bleed or hematoma    A/P     BP recheck 90/  after getting back to bed  BB/ARB stopped yesterday  continue hydrocortisone and midodrine  rpt CBC 7.8/23 today - trending down  seen by Attending Dr Covington and 1 unit of PRBC ordered   stool for occult blood   abdominal US ordered   transfer to 2 DSU - report given to medicine NP Nadine Hypotension- asymptomatic    patient sitting OOB in chair BP 78/37  patient denies dizziness, CP or SOB    A+O x 4  Pulm CTA   CV S1S2 rate 60-70  abd soft non tender   R groin - non tender,  < pea size firmness at access site, surrounding area soft, no bleeding +DP  L groin soft, non tender no bleed or hematoma +DP    A/P     BP recheck 90/44 after getting back to bed  BB/ARB stopped yesterday   continue hydrocortisone and midodrine  rpt CBC 7.8/23 today - trending down  seen by Attending Dr Covington and 1 unit of PRBC ordered   stool for occult blood   abdominal US ordered -NPO   transfer to 2 DSU - report given to medicine NP Nadine

## 2018-04-19 NOTE — PROGRESS NOTE ADULT - SUBJECTIVE AND OBJECTIVE BOX
resting,  no complaints   family at bedside      REVIEW OF SYSTEMS:  CONSTITUTIONAL: No weakness,  no   fevers      RESPIRATORY:  No    shortness of breath  CARDIOVASCULAR: No chest pain,   GASTROINTESTINAL: No abdominal  pain  NEUROLOGICAL: No  focal  weakness    MEDICATIONS  (STANDING):  ALPRAZolam 0.5 milliGRAM(s) Oral two times a day  aspirin enteric coated 81 milliGRAM(s) Oral daily  clopidogrel Tablet 75 milliGRAM(s) Oral daily  dextrose 5%. 1000 milliLiter(s) (50 mL/Hr) IV Continuous <Continuous>  dextrose 50% Injectable 12.5 Gram(s) IV Push once  dextrose 50% Injectable 12.5 Gram(s) IV Push once  dextrose 50% Injectable 12.5 Gram(s) IV Push once  dextrose 50% Injectable 25 Gram(s) IV Push once  dextrose 50% Injectable 25 Gram(s) IV Push once  dextrose 50% Injectable 12.5 Gram(s) IV Push once  finasteride 5 milliGRAM(s) Oral daily  folic acid 1 milliGRAM(s) Oral daily  hydrocortisone sodium succinate Injectable 50 milliGRAM(s) IV Push three times a day  midodrine 5 milliGRAM(s) Oral three times a day  montelukast 10 milliGRAM(s) Oral daily  phenytoin   Capsule 200 milliGRAM(s) Oral two times a day  simvastatin 20 milliGRAM(s) Oral at bedtime  sodium chloride 0.9% lock flush 3 milliLiter(s) IV Push every 8 hours    MEDICATIONS  (PRN):  ALBUTerol    90 MICROgram(s) HFA Inhaler 2 Puff(s) Inhalation every 6 hours PRN Shortness of Breath and/or Wheezing  dextrose Gel 1 Dose(s) Oral once PRN Blood Glucose LESS THAN 70 milliGRAM(s)/deciLiter  glucagon  Injectable 1 milliGRAM(s) IntraMuscular once PRN Glucose <70 milliGRAM(s)/deciLiter      Vital Signs Last 24 Hrs  T(C): 36.6 (19 Apr 2018 05:36), Max: 36.6 (19 Apr 2018 05:36)  T(F): 97.8 (19 Apr 2018 05:36), Max: 97.8 (19 Apr 2018 05:36)  HR: 66 (19 Apr 2018 08:17) (59 - 77)  BP: 90/44 (19 Apr 2018 08:17) (74/31 - 99/54)  BP(mean): --  RR: 18 (19 Apr 2018 05:36) (17 - 18)  SpO2: 99% (19 Apr 2018 05:36) (98% - 100%)  CAPILLARY BLOOD GLUCOSE      POCT Blood Glucose.: 98 mg/dL (19 Apr 2018 08:47)  POCT Blood Glucose.: 96 mg/dL (19 Apr 2018 08:26)    I&O's Summary    18 Apr 2018 07:01  -  19 Apr 2018 07:00  --------------------------------------------------------  IN: 560 mL / OUT: 400 mL / NET: 160 mL        PHYSICAL EXAM:  HEAD:  Atraumatic, Normocephalic  NECK: Supple, No JVD  CHEST/LUNG:   no     rales,     no,    rhonchi  HEART: Regular rate and rhythm;         murmur  ABDOMEN: Soft, Nontender, ;   EXTREMITIES:  less      edema  NEUROLOGY:  alert    LABS:                        7.8    4.3   )-----------( 165      ( 19 Apr 2018 06:20 )             23.4     04-19    138  |  105  |  23  ----------------------------<  69<L>  4.0   |  25  |  0.75    Ca    8.9      19 Apr 2018 06:20  Mg     1.9     04-19    TPro  6.4  /  Alb  2.4<L>  /  TBili  0.2  /  DBili  <0.1  /  AST  35  /  ALT  36  /  AlkPhos  242<H>  04-19                            Consultant(s) Notes Reviewed:      Care Discussed with Consultants/Other Providers:

## 2018-04-20 ENCOUNTER — APPOINTMENT (OUTPATIENT)
Dept: PULMONOLOGY | Facility: CLINIC | Age: 83
End: 2018-04-20

## 2018-04-20 VITALS — DIASTOLIC BLOOD PRESSURE: 52 MMHG | SYSTOLIC BLOOD PRESSURE: 122 MMHG | HEART RATE: 77 BPM

## 2018-04-20 LAB
ANION GAP SERPL CALC-SCNC: 7 MMOL/L — SIGNIFICANT CHANGE UP (ref 5–17)
BUN SERPL-MCNC: 22 MG/DL — SIGNIFICANT CHANGE UP (ref 7–23)
CALCIUM SERPL-MCNC: 9.1 MG/DL — SIGNIFICANT CHANGE UP (ref 8.4–10.5)
CHLORIDE SERPL-SCNC: 106 MMOL/L — SIGNIFICANT CHANGE UP (ref 96–108)
CO2 SERPL-SCNC: 26 MMOL/L — SIGNIFICANT CHANGE UP (ref 22–31)
CREAT SERPL-MCNC: 0.71 MG/DL — SIGNIFICANT CHANGE UP (ref 0.5–1.3)
GLUCOSE SERPL-MCNC: 80 MG/DL — SIGNIFICANT CHANGE UP (ref 70–99)
HCT VFR BLD CALC: 25 % — LOW (ref 39–50)
HGB BLD-MCNC: 8.4 G/DL — LOW (ref 13–17)
MCHC RBC-ENTMCNC: 30.3 PG — SIGNIFICANT CHANGE UP (ref 27–34)
MCHC RBC-ENTMCNC: 33.6 GM/DL — SIGNIFICANT CHANGE UP (ref 32–36)
MCV RBC AUTO: 90.3 FL — SIGNIFICANT CHANGE UP (ref 80–100)
OB PNL STL: NEGATIVE — SIGNIFICANT CHANGE UP
PLATELET # BLD AUTO: 158 K/UL — SIGNIFICANT CHANGE UP (ref 150–400)
POTASSIUM SERPL-MCNC: 4 MMOL/L — SIGNIFICANT CHANGE UP (ref 3.5–5.3)
POTASSIUM SERPL-SCNC: 4 MMOL/L — SIGNIFICANT CHANGE UP (ref 3.5–5.3)
RBC # BLD: 2.77 M/UL — LOW (ref 4.2–5.8)
RBC # FLD: 17.5 % — HIGH (ref 10.3–14.5)
SODIUM SERPL-SCNC: 139 MMOL/L — SIGNIFICANT CHANGE UP (ref 135–145)
WBC # BLD: 4.28 K/UL — SIGNIFICANT CHANGE UP (ref 3.8–10.5)
WBC # FLD AUTO: 4.28 K/UL — SIGNIFICANT CHANGE UP (ref 3.8–10.5)

## 2018-04-20 PROCEDURE — 80048 BASIC METABOLIC PNL TOTAL CA: CPT

## 2018-04-20 PROCEDURE — P9016: CPT

## 2018-04-20 PROCEDURE — C1887: CPT

## 2018-04-20 PROCEDURE — 76700 US EXAM ABDOM COMPLETE: CPT

## 2018-04-20 PROCEDURE — 36430 TRANSFUSION BLD/BLD COMPNT: CPT

## 2018-04-20 PROCEDURE — C1725: CPT

## 2018-04-20 PROCEDURE — 80053 COMPREHEN METABOLIC PANEL: CPT

## 2018-04-20 PROCEDURE — 86850 RBC ANTIBODY SCREEN: CPT

## 2018-04-20 PROCEDURE — 93454 CORONARY ARTERY ANGIO S&I: CPT | Mod: 59

## 2018-04-20 PROCEDURE — 94640 AIRWAY INHALATION TREATMENT: CPT

## 2018-04-20 PROCEDURE — 86900 BLOOD TYPING SEROLOGIC ABO: CPT

## 2018-04-20 PROCEDURE — 99152 MOD SED SAME PHYS/QHP 5/>YRS: CPT

## 2018-04-20 PROCEDURE — 86901 BLOOD TYPING SEROLOGIC RH(D): CPT

## 2018-04-20 PROCEDURE — 85027 COMPLETE CBC AUTOMATED: CPT

## 2018-04-20 PROCEDURE — 76937 US GUIDE VASCULAR ACCESS: CPT

## 2018-04-20 PROCEDURE — 97162 PT EVAL MOD COMPLEX 30 MIN: CPT

## 2018-04-20 PROCEDURE — 80076 HEPATIC FUNCTION PANEL: CPT

## 2018-04-20 PROCEDURE — C1769: CPT

## 2018-04-20 PROCEDURE — 82962 GLUCOSE BLOOD TEST: CPT

## 2018-04-20 PROCEDURE — C1894: CPT

## 2018-04-20 PROCEDURE — C9600: CPT | Mod: LD

## 2018-04-20 PROCEDURE — 71045 X-RAY EXAM CHEST 1 VIEW: CPT

## 2018-04-20 PROCEDURE — 92921: CPT | Mod: LD

## 2018-04-20 PROCEDURE — C1874: CPT

## 2018-04-20 PROCEDURE — 83735 ASSAY OF MAGNESIUM: CPT

## 2018-04-20 PROCEDURE — 99153 MOD SED SAME PHYS/QHP EA: CPT

## 2018-04-20 PROCEDURE — 93005 ELECTROCARDIOGRAM TRACING: CPT

## 2018-04-20 PROCEDURE — 82272 OCCULT BLD FECES 1-3 TESTS: CPT

## 2018-04-20 PROCEDURE — 86923 COMPATIBILITY TEST ELECTRIC: CPT

## 2018-04-20 RX ORDER — MIDODRINE HYDROCHLORIDE 2.5 MG/1
1 TABLET ORAL
Qty: 90 | Refills: 0 | OUTPATIENT
Start: 2018-04-20 | End: 2018-05-19

## 2018-04-20 RX ORDER — HYDROCORTISONE 20 MG
50 TABLET ORAL
Qty: 0 | Refills: 0 | Status: DISCONTINUED | OUTPATIENT
Start: 2018-04-20 | End: 2018-04-20

## 2018-04-20 RX ADMIN — Medication 200 MILLIGRAM(S): at 06:46

## 2018-04-20 RX ADMIN — MIDODRINE HYDROCHLORIDE 5 MILLIGRAM(S): 2.5 TABLET ORAL at 11:55

## 2018-04-20 RX ADMIN — MONTELUKAST 10 MILLIGRAM(S): 4 TABLET, CHEWABLE ORAL at 11:54

## 2018-04-20 RX ADMIN — Medication 81 MILLIGRAM(S): at 11:53

## 2018-04-20 RX ADMIN — Medication 1 MILLIGRAM(S): at 11:54

## 2018-04-20 RX ADMIN — SODIUM CHLORIDE 3 MILLILITER(S): 9 INJECTION INTRAMUSCULAR; INTRAVENOUS; SUBCUTANEOUS at 05:57

## 2018-04-20 RX ADMIN — Medication 50 MILLIGRAM(S): at 02:00

## 2018-04-20 RX ADMIN — Medication 0.5 MILLIGRAM(S): at 06:46

## 2018-04-20 RX ADMIN — MIDODRINE HYDROCHLORIDE 5 MILLIGRAM(S): 2.5 TABLET ORAL at 02:00

## 2018-04-20 RX ADMIN — CLOPIDOGREL BISULFATE 75 MILLIGRAM(S): 75 TABLET, FILM COATED ORAL at 11:54

## 2018-04-20 RX ADMIN — FINASTERIDE 5 MILLIGRAM(S): 5 TABLET, FILM COATED ORAL at 11:54

## 2018-04-20 RX ADMIN — Medication 20 MILLIGRAM(S): at 11:53

## 2018-04-20 NOTE — PROGRESS NOTE ADULT - SUBJECTIVE AND OBJECTIVE BOX
no  complaints    REVIEW OF SYSTEMS:  CONSTITUTIONAL: No weakness,  no   fevers      RESPIRATORY:  No    shortness of breath  CARDIOVASCULAR: No chest pain,   GASTROINTESTINAL: No abdominal  pain  NEUROLOGICAL: No  focal  weakness    MEDICATIONS  (STANDING):  ALPRAZolam 0.5 milliGRAM(s) Oral two times a day  aspirin enteric coated 81 milliGRAM(s) Oral daily  clopidogrel Tablet 75 milliGRAM(s) Oral daily  dextrose 50% Injectable 25 Gram(s) IV Push once  dextrose 50% Injectable 25 Gram(s) IV Push once  finasteride 5 milliGRAM(s) Oral daily  folic acid 1 milliGRAM(s) Oral daily  hydrocortisone sodium succinate Injectable 50 milliGRAM(s) IV Push three times a day  midodrine 5 milliGRAM(s) Oral three times a day  montelukast 10 milliGRAM(s) Oral daily  phenytoin   Capsule 200 milliGRAM(s) Oral two times a day  simvastatin 20 milliGRAM(s) Oral at bedtime  sodium chloride 0.9% lock flush 3 milliLiter(s) IV Push every 8 hours    MEDICATIONS  (PRN):  ALBUTerol    90 MICROgram(s) HFA Inhaler 2 Puff(s) Inhalation every 6 hours PRN Shortness of Breath and/or Wheezing  dextrose Gel 1 Dose(s) Oral once PRN Blood Glucose LESS THAN 70 milliGRAM(s)/deciLiter  glucagon  Injectable 1 milliGRAM(s) IntraMuscular once PRN Glucose <70 milliGRAM(s)/deciLiter      Vital Signs Last 24 Hrs  T(C): 36.3 (20 Apr 2018 04:28), Max: 36.8 (19 Apr 2018 11:30)  T(F): 97.4 (20 Apr 2018 04:28), Max: 98.2 (19 Apr 2018 11:30)  HR: 68 (20 Apr 2018 04:28) (58 - 77)  BP: 104/54 (20 Apr 2018 07:30) (90/44 - 104/54)  BP(mean): --  RR: 18 (20 Apr 2018 04:28) (16 - 18)  SpO2: 96% (20 Apr 2018 04:28) (93% - 100%)  CAPILLARY BLOOD GLUCOSE      POCT Blood Glucose.: 127 mg/dL (19 Apr 2018 11:40)  POCT Blood Glucose.: 138 mg/dL (19 Apr 2018 09:53)  POCT Blood Glucose.: 93 mg/dL (19 Apr 2018 09:07)  POCT Blood Glucose.: 98 mg/dL (19 Apr 2018 08:47)  POCT Blood Glucose.: 96 mg/dL (19 Apr 2018 08:26)    I&O's Summary    19 Apr 2018 07:01  -  20 Apr 2018 07:00  --------------------------------------------------------  IN: 320 mL / OUT: 0 mL / NET: 320 mL        PHYSICAL EXAM:  HEAD:  Atraumatic, Normocephalic  NECK: Supple, No JVD  CHEST/LUNG:   no     rales,     no,    rhonchi  HEART: Regular rate and rhythm;         murmur  ABDOMEN: Soft, Nontender, ;   EXTREMITIES:  less      edema  NEUROLOGY:  alert    LABS:                        7.8    4.3   )-----------( 165      ( 19 Apr 2018 06:20 )             23.4     04-20    139  |  106  |  22  ----------------------------<  80  4.0   |  26  |  0.71    Ca    9.1      20 Apr 2018 06:07  Mg     1.9     04-19    TPro  6.4  /  Alb  2.4<L>  /  TBili  0.2  /  DBili  <0.1  /  AST  35  /  ALT  36  /  AlkPhos  242<H>  04-19                            Consultant(s) Notes Reviewed:      Care Discussed with Consultants/Other Providers:

## 2018-04-20 NOTE — PROGRESS NOTE ADULT - ASSESSMENT
pt  with  h/o htn, cad, stents, br  asthma, seizure, cva admitted  with cp   s./p cath,  now  with rico  normal; ef   now with anemia  ,   awaiting echo   on c/c . prednisone,, for  RA,    now  with low  bp,   on  stress dose  hydrocortisone, tid  hb of 7,.  prbc  given,  awaiting hb from today  bp improving, taper  iv steroid  dc  tele      < from: Cardiac Cath Lab - Adult (04.17.18 @ 15:55) >    [facility] - Interventional.  --  Intervention on mid LAD: drug-eluting stent.  --  Intervention on D1: balloon angioplasty.    < end of copied text >

## 2018-04-21 ENCOUNTER — INPATIENT (INPATIENT)
Facility: HOSPITAL | Age: 83
LOS: 11 days | Discharge: HOME | DRG: 643 | End: 2018-05-03
Attending: INTERNAL MEDICINE | Admitting: INTERNAL MEDICINE
Payer: MEDICARE

## 2018-04-21 VITALS
DIASTOLIC BLOOD PRESSURE: 62 MMHG | RESPIRATION RATE: 18 BRPM | HEART RATE: 62 BPM | OXYGEN SATURATION: 90 % | SYSTOLIC BLOOD PRESSURE: 115 MMHG

## 2018-04-21 DIAGNOSIS — Z98.890 OTHER SPECIFIED POSTPROCEDURAL STATES: Chronic | ICD-10-CM

## 2018-04-21 DIAGNOSIS — M12.9 ARTHROPATHY, UNSPECIFIED: Chronic | ICD-10-CM

## 2018-04-21 DIAGNOSIS — K91.89 OTHER POSTPROCEDURAL COMPLICATIONS AND DISORDERS OF DIGESTIVE SYSTEM: Chronic | ICD-10-CM

## 2018-04-21 DIAGNOSIS — Z90.89 ACQUIRED ABSENCE OF OTHER ORGANS: Chronic | ICD-10-CM

## 2018-04-21 DIAGNOSIS — E27.40 UNSPECIFIED ADRENOCORTICAL INSUFFICIENCY: ICD-10-CM

## 2018-04-21 DIAGNOSIS — Z96.649 PRESENCE OF UNSPECIFIED ARTIFICIAL HIP JOINT: Chronic | ICD-10-CM

## 2018-04-21 DIAGNOSIS — Z95.5 PRESENCE OF CORONARY ANGIOPLASTY IMPLANT AND GRAFT: Chronic | ICD-10-CM

## 2018-04-21 LAB
ALBUMIN SERPL ELPH-MCNC: 2.4 G/DL — LOW (ref 3.3–5)
ALBUMIN SERPL ELPH-MCNC: 2.5 G/DL — LOW (ref 3.3–5)
ALP SERPL-CCNC: 232 U/L — HIGH (ref 40–120)
ALP SERPL-CCNC: 234 U/L — HIGH (ref 40–120)
ALT FLD-CCNC: 35 U/L — SIGNIFICANT CHANGE UP (ref 10–45)
ALT FLD-CCNC: 36 U/L — SIGNIFICANT CHANGE UP (ref 10–45)
ANION GAP SERPL CALC-SCNC: 8 MMOL/L — SIGNIFICANT CHANGE UP (ref 5–17)
ANION GAP SERPL CALC-SCNC: 9 MMOL/L — SIGNIFICANT CHANGE UP (ref 5–17)
APPEARANCE UR: CLEAR — SIGNIFICANT CHANGE UP
APTT BLD: 30.6 SEC — SIGNIFICANT CHANGE UP (ref 27.5–37.4)
AST SERPL-CCNC: 30 U/L — SIGNIFICANT CHANGE UP (ref 10–40)
AST SERPL-CCNC: 32 U/L — SIGNIFICANT CHANGE UP (ref 10–40)
BASOPHILS # BLD AUTO: 0 K/UL — SIGNIFICANT CHANGE UP (ref 0–0.2)
BASOPHILS # BLD AUTO: 0 K/UL — SIGNIFICANT CHANGE UP (ref 0–0.2)
BASOPHILS NFR BLD AUTO: 0 % — SIGNIFICANT CHANGE UP (ref 0–2)
BASOPHILS NFR BLD AUTO: 0 % — SIGNIFICANT CHANGE UP (ref 0–2)
BILIRUB SERPL-MCNC: 0.2 MG/DL — SIGNIFICANT CHANGE UP (ref 0.2–1.2)
BILIRUB SERPL-MCNC: 0.2 MG/DL — SIGNIFICANT CHANGE UP (ref 0.2–1.2)
BILIRUB UR-MCNC: NEGATIVE — SIGNIFICANT CHANGE UP
BUN SERPL-MCNC: 22 MG/DL — SIGNIFICANT CHANGE UP (ref 7–23)
BUN SERPL-MCNC: 24 MG/DL — HIGH (ref 7–23)
CALCIUM SERPL-MCNC: 8.4 MG/DL — SIGNIFICANT CHANGE UP (ref 8.4–10.5)
CALCIUM SERPL-MCNC: 8.7 MG/DL — SIGNIFICANT CHANGE UP (ref 8.4–10.5)
CHLORIDE SERPL-SCNC: 106 MMOL/L — SIGNIFICANT CHANGE UP (ref 96–108)
CHLORIDE SERPL-SCNC: 107 MMOL/L — SIGNIFICANT CHANGE UP (ref 96–108)
CK MB CFR SERPL CALC: 11.5 NG/ML — HIGH (ref 0–6.7)
CK MB CFR SERPL CALC: 13.2 NG/ML — HIGH (ref 0–6.7)
CK SERPL-CCNC: 80 U/L — SIGNIFICANT CHANGE UP (ref 30–200)
CK SERPL-CCNC: 92 U/L — SIGNIFICANT CHANGE UP (ref 30–200)
CO2 SERPL-SCNC: 22 MMOL/L — SIGNIFICANT CHANGE UP (ref 22–31)
CO2 SERPL-SCNC: 24 MMOL/L — SIGNIFICANT CHANGE UP (ref 22–31)
COLOR SPEC: YELLOW — SIGNIFICANT CHANGE UP
CREAT SERPL-MCNC: 0.7 MG/DL — SIGNIFICANT CHANGE UP (ref 0.5–1.3)
CREAT SERPL-MCNC: 0.76 MG/DL — SIGNIFICANT CHANGE UP (ref 0.5–1.3)
DIFF PNL FLD: NEGATIVE — SIGNIFICANT CHANGE UP
EOSINOPHIL # BLD AUTO: 0 K/UL — SIGNIFICANT CHANGE UP (ref 0–0.5)
EOSINOPHIL # BLD AUTO: 0.1 K/UL — SIGNIFICANT CHANGE UP (ref 0–0.5)
EOSINOPHIL NFR BLD AUTO: 0.1 % — SIGNIFICANT CHANGE UP (ref 0–6)
EOSINOPHIL NFR BLD AUTO: 1.4 % — SIGNIFICANT CHANGE UP (ref 0–6)
GAS PNL BLDV: SIGNIFICANT CHANGE UP
GLUCOSE BLDC GLUCOMTR-MCNC: 144 MG/DL — HIGH (ref 70–99)
GLUCOSE BLDC GLUCOMTR-MCNC: 97 MG/DL — SIGNIFICANT CHANGE UP (ref 70–99)
GLUCOSE SERPL-MCNC: 107 MG/DL — HIGH (ref 70–99)
GLUCOSE SERPL-MCNC: 86 MG/DL — SIGNIFICANT CHANGE UP (ref 70–99)
GLUCOSE UR QL: NEGATIVE — SIGNIFICANT CHANGE UP
HCT VFR BLD CALC: 25.5 % — LOW (ref 39–50)
HCT VFR BLD CALC: 27.7 % — LOW (ref 39–50)
HGB BLD-MCNC: 8.6 G/DL — LOW (ref 13–17)
HGB BLD-MCNC: 9.2 G/DL — LOW (ref 13–17)
INR BLD: 0.96 RATIO — SIGNIFICANT CHANGE UP (ref 0.88–1.16)
KETONES UR-MCNC: NEGATIVE — SIGNIFICANT CHANGE UP
LEUKOCYTE ESTERASE UR-ACNC: NEGATIVE — SIGNIFICANT CHANGE UP
LYMPHOCYTES # BLD AUTO: 0.5 K/UL — LOW (ref 1–3.3)
LYMPHOCYTES # BLD AUTO: 1.1 K/UL — SIGNIFICANT CHANGE UP (ref 1–3.3)
LYMPHOCYTES # BLD AUTO: 11.1 % — LOW (ref 13–44)
LYMPHOCYTES # BLD AUTO: 16.9 % — SIGNIFICANT CHANGE UP (ref 13–44)
MAGNESIUM SERPL-MCNC: 1.7 MG/DL — SIGNIFICANT CHANGE UP (ref 1.6–2.6)
MCHC RBC-ENTMCNC: 31.8 PG — SIGNIFICANT CHANGE UP (ref 27–34)
MCHC RBC-ENTMCNC: 32.3 PG — SIGNIFICANT CHANGE UP (ref 27–34)
MCHC RBC-ENTMCNC: 33.3 GM/DL — SIGNIFICANT CHANGE UP (ref 32–36)
MCHC RBC-ENTMCNC: 33.7 GM/DL — SIGNIFICANT CHANGE UP (ref 32–36)
MCV RBC AUTO: 95.5 FL — SIGNIFICANT CHANGE UP (ref 80–100)
MCV RBC AUTO: 95.7 FL — SIGNIFICANT CHANGE UP (ref 80–100)
MONOCYTES # BLD AUTO: 0.2 K/UL — SIGNIFICANT CHANGE UP (ref 0–0.9)
MONOCYTES # BLD AUTO: 0.8 K/UL — SIGNIFICANT CHANGE UP (ref 0–0.9)
MONOCYTES NFR BLD AUTO: 13.1 % — SIGNIFICANT CHANGE UP (ref 2–14)
MONOCYTES NFR BLD AUTO: 3.4 % — SIGNIFICANT CHANGE UP (ref 2–14)
NEUTROPHILS # BLD AUTO: 4.2 K/UL — SIGNIFICANT CHANGE UP (ref 1.8–7.4)
NEUTROPHILS # BLD AUTO: 4.3 K/UL — SIGNIFICANT CHANGE UP (ref 1.8–7.4)
NEUTROPHILS NFR BLD AUTO: 68.5 % — SIGNIFICANT CHANGE UP (ref 43–77)
NEUTROPHILS NFR BLD AUTO: 85.3 % — HIGH (ref 43–77)
NITRITE UR-MCNC: NEGATIVE — SIGNIFICANT CHANGE UP
PH UR: 6 — SIGNIFICANT CHANGE UP (ref 5–8)
PHOSPHATE SERPL-MCNC: 2.7 MG/DL — SIGNIFICANT CHANGE UP (ref 2.5–4.5)
PLATELET # BLD AUTO: 164 K/UL — SIGNIFICANT CHANGE UP (ref 150–400)
PLATELET # BLD AUTO: 179 K/UL — SIGNIFICANT CHANGE UP (ref 150–400)
POTASSIUM SERPL-MCNC: 3.6 MMOL/L — SIGNIFICANT CHANGE UP (ref 3.5–5.3)
POTASSIUM SERPL-MCNC: 4.2 MMOL/L — SIGNIFICANT CHANGE UP (ref 3.5–5.3)
POTASSIUM SERPL-SCNC: 3.6 MMOL/L — SIGNIFICANT CHANGE UP (ref 3.5–5.3)
POTASSIUM SERPL-SCNC: 4.2 MMOL/L — SIGNIFICANT CHANGE UP (ref 3.5–5.3)
PROT SERPL-MCNC: 6.1 G/DL — SIGNIFICANT CHANGE UP (ref 6–8.3)
PROT SERPL-MCNC: 6.2 G/DL — SIGNIFICANT CHANGE UP (ref 6–8.3)
PROT UR-MCNC: 30 MG/DL
PROTHROM AB SERPL-ACNC: 10.5 SEC — SIGNIFICANT CHANGE UP (ref 9.8–12.7)
RAPID RVP RESULT: SIGNIFICANT CHANGE UP
RBC # BLD: 2.67 M/UL — LOW (ref 4.2–5.8)
RBC # BLD: 2.9 M/UL — LOW (ref 4.2–5.8)
RBC # FLD: 15.9 % — HIGH (ref 10.3–14.5)
RBC # FLD: 15.9 % — HIGH (ref 10.3–14.5)
SODIUM SERPL-SCNC: 136 MMOL/L — SIGNIFICANT CHANGE UP (ref 135–145)
SODIUM SERPL-SCNC: 140 MMOL/L — SIGNIFICANT CHANGE UP (ref 135–145)
SP GR SPEC: 1.03 — HIGH (ref 1.01–1.02)
TROPONIN T SERPL-MCNC: 0.16 NG/ML — HIGH (ref 0–0.06)
TROPONIN T SERPL-MCNC: 0.17 NG/ML — HIGH (ref 0–0.06)
TROPONIN T SERPL-MCNC: 0.18 NG/ML — HIGH (ref 0–0.06)
UROBILINOGEN FLD QL: 1 MG/DL
WBC # BLD: 4.9 K/UL — SIGNIFICANT CHANGE UP (ref 3.8–10.5)
WBC # BLD: 6.2 K/UL — SIGNIFICANT CHANGE UP (ref 3.8–10.5)
WBC # FLD AUTO: 4.9 K/UL — SIGNIFICANT CHANGE UP (ref 3.8–10.5)
WBC # FLD AUTO: 6.2 K/UL — SIGNIFICANT CHANGE UP (ref 3.8–10.5)

## 2018-04-21 PROCEDURE — 93306 TTE W/DOPPLER COMPLETE: CPT | Mod: 26

## 2018-04-21 PROCEDURE — 99291 CRITICAL CARE FIRST HOUR: CPT | Mod: GC

## 2018-04-21 PROCEDURE — 99291 CRITICAL CARE FIRST HOUR: CPT | Mod: 25

## 2018-04-21 PROCEDURE — 93971 EXTREMITY STUDY: CPT | Mod: 26,GC

## 2018-04-21 PROCEDURE — 76604 US EXAM CHEST: CPT | Mod: 26,GC

## 2018-04-21 PROCEDURE — 93308 TTE F-UP OR LMTD: CPT | Mod: 26,GC

## 2018-04-21 PROCEDURE — 71045 X-RAY EXAM CHEST 1 VIEW: CPT | Mod: 26

## 2018-04-21 RX ORDER — VANCOMYCIN HCL 1 G
1000 VIAL (EA) INTRAVENOUS ONCE
Qty: 0 | Refills: 0 | Status: COMPLETED | OUTPATIENT
Start: 2018-04-21 | End: 2018-04-21

## 2018-04-21 RX ORDER — PYRIDOXINE HCL (VITAMIN B6) 100 MG
100 TABLET ORAL DAILY
Qty: 0 | Refills: 0 | Status: DISCONTINUED | OUTPATIENT
Start: 2018-04-21 | End: 2018-05-03

## 2018-04-21 RX ORDER — BUDESONIDE AND FORMOTEROL FUMARATE DIHYDRATE 160; 4.5 UG/1; UG/1
2 AEROSOL RESPIRATORY (INHALATION)
Qty: 0 | Refills: 0 | Status: DISCONTINUED | OUTPATIENT
Start: 2018-04-21 | End: 2018-05-03

## 2018-04-21 RX ORDER — HEPARIN SODIUM 5000 [USP'U]/ML
5000 INJECTION INTRAVENOUS; SUBCUTANEOUS EVERY 8 HOURS
Qty: 0 | Refills: 0 | Status: DISCONTINUED | OUTPATIENT
Start: 2018-04-21 | End: 2018-04-27

## 2018-04-21 RX ORDER — SODIUM CHLORIDE 9 MG/ML
1000 INJECTION, SOLUTION INTRAVENOUS ONCE
Qty: 0 | Refills: 0 | Status: COMPLETED | OUTPATIENT
Start: 2018-04-21 | End: 2018-04-21

## 2018-04-21 RX ORDER — HYDROCORTISONE 20 MG
100 TABLET ORAL EVERY 8 HOURS
Qty: 0 | Refills: 0 | Status: DISCONTINUED | OUTPATIENT
Start: 2018-04-21 | End: 2018-04-23

## 2018-04-21 RX ORDER — FINASTERIDE 5 MG/1
5 TABLET, FILM COATED ORAL DAILY
Qty: 0 | Refills: 0 | Status: DISCONTINUED | OUTPATIENT
Start: 2018-04-21 | End: 2018-04-27

## 2018-04-21 RX ORDER — AZITHROMYCIN 500 MG/1
500 TABLET, FILM COATED ORAL EVERY 24 HOURS
Qty: 0 | Refills: 0 | Status: DISCONTINUED | OUTPATIENT
Start: 2018-04-21 | End: 2018-04-23

## 2018-04-21 RX ORDER — ENOXAPARIN SODIUM 100 MG/ML
40 INJECTION SUBCUTANEOUS EVERY 24 HOURS
Qty: 0 | Refills: 0 | Status: DISCONTINUED | OUTPATIENT
Start: 2018-04-21 | End: 2018-04-21

## 2018-04-21 RX ORDER — NOREPINEPHRINE BITARTRATE/D5W 8 MG/250ML
0.05 PLASTIC BAG, INJECTION (ML) INTRAVENOUS
Qty: 8 | Refills: 0 | Status: DISCONTINUED | OUTPATIENT
Start: 2018-04-21 | End: 2018-04-22

## 2018-04-21 RX ORDER — SODIUM CHLORIDE 9 MG/ML
1000 INJECTION, SOLUTION INTRAVENOUS ONCE
Qty: 0 | Refills: 0 | Status: DISCONTINUED | OUTPATIENT
Start: 2018-04-21 | End: 2018-04-21

## 2018-04-21 RX ORDER — FOLIC ACID 0.8 MG
1 TABLET ORAL DAILY
Qty: 0 | Refills: 0 | Status: DISCONTINUED | OUTPATIENT
Start: 2018-04-21 | End: 2018-05-03

## 2018-04-21 RX ORDER — IPRATROPIUM/ALBUTEROL SULFATE 18-103MCG
3 AEROSOL WITH ADAPTER (GRAM) INHALATION EVERY 6 HOURS
Qty: 0 | Refills: 0 | Status: DISCONTINUED | OUTPATIENT
Start: 2018-04-21 | End: 2018-05-03

## 2018-04-21 RX ORDER — VANCOMYCIN HCL 1 G
1000 VIAL (EA) INTRAVENOUS EVERY 12 HOURS
Qty: 0 | Refills: 0 | Status: DISCONTINUED | OUTPATIENT
Start: 2018-04-21 | End: 2018-04-23

## 2018-04-21 RX ORDER — HYDROCORTISONE 20 MG
100 TABLET ORAL ONCE
Qty: 0 | Refills: 0 | Status: COMPLETED | OUTPATIENT
Start: 2018-04-21 | End: 2018-04-21

## 2018-04-21 RX ORDER — AZTREONAM 2 G
1000 VIAL (EA) INJECTION ONCE
Qty: 0 | Refills: 0 | Status: COMPLETED | OUTPATIENT
Start: 2018-04-21 | End: 2018-04-21

## 2018-04-21 RX ORDER — TIOTROPIUM BROMIDE 18 UG/1
1 CAPSULE ORAL; RESPIRATORY (INHALATION) DAILY
Qty: 0 | Refills: 0 | Status: DISCONTINUED | OUTPATIENT
Start: 2018-04-21 | End: 2018-05-03

## 2018-04-21 RX ORDER — MIDODRINE HYDROCHLORIDE 2.5 MG/1
5 TABLET ORAL EVERY 8 HOURS
Qty: 0 | Refills: 0 | Status: DISCONTINUED | OUTPATIENT
Start: 2018-04-21 | End: 2018-04-27

## 2018-04-21 RX ORDER — PREGABALIN 225 MG/1
500 CAPSULE ORAL DAILY
Qty: 0 | Refills: 0 | Status: DISCONTINUED | OUTPATIENT
Start: 2018-04-21 | End: 2018-05-03

## 2018-04-21 RX ORDER — ASPIRIN/CALCIUM CARB/MAGNESIUM 324 MG
81 TABLET ORAL DAILY
Qty: 0 | Refills: 0 | Status: DISCONTINUED | OUTPATIENT
Start: 2018-04-21 | End: 2018-05-03

## 2018-04-21 RX ORDER — ALPRAZOLAM 0.25 MG
0.5 TABLET ORAL
Qty: 0 | Refills: 0 | Status: DISCONTINUED | OUTPATIENT
Start: 2018-04-21 | End: 2018-04-28

## 2018-04-21 RX ORDER — SODIUM CHLORIDE 9 MG/ML
1000 INJECTION, SOLUTION INTRAVENOUS
Qty: 0 | Refills: 0 | Status: DISCONTINUED | OUTPATIENT
Start: 2018-04-21 | End: 2018-04-21

## 2018-04-21 RX ORDER — CLOPIDOGREL BISULFATE 75 MG/1
75 TABLET, FILM COATED ORAL DAILY
Qty: 0 | Refills: 0 | Status: DISCONTINUED | OUTPATIENT
Start: 2018-04-21 | End: 2018-05-03

## 2018-04-21 RX ORDER — ASPIRIN/CALCIUM CARB/MAGNESIUM 324 MG
81 TABLET ORAL DAILY
Qty: 0 | Refills: 0 | Status: DISCONTINUED | OUTPATIENT
Start: 2018-04-21 | End: 2018-04-21

## 2018-04-21 RX ORDER — HYDROCORTISONE 20 MG
100 TABLET ORAL ONCE
Qty: 0 | Refills: 0 | Status: DISCONTINUED | OUTPATIENT
Start: 2018-04-21 | End: 2018-04-21

## 2018-04-21 RX ORDER — AZTREONAM 2 G
1000 VIAL (EA) INJECTION EVERY 8 HOURS
Qty: 0 | Refills: 0 | Status: DISCONTINUED | OUTPATIENT
Start: 2018-04-21 | End: 2018-04-23

## 2018-04-21 RX ORDER — MONTELUKAST 4 MG/1
10 TABLET, CHEWABLE ORAL DAILY
Qty: 0 | Refills: 0 | Status: DISCONTINUED | OUTPATIENT
Start: 2018-04-21 | End: 2018-05-03

## 2018-04-21 RX ORDER — ACETAMINOPHEN 500 MG
650 TABLET ORAL EVERY 6 HOURS
Qty: 0 | Refills: 0 | Status: DISCONTINUED | OUTPATIENT
Start: 2018-04-21 | End: 2018-05-03

## 2018-04-21 RX ADMIN — Medication 250 MILLIGRAM(S): at 05:27

## 2018-04-21 RX ADMIN — Medication 100 MILLIGRAM(S): at 13:20

## 2018-04-21 RX ADMIN — CLOPIDOGREL BISULFATE 75 MILLIGRAM(S): 75 TABLET, FILM COATED ORAL at 13:16

## 2018-04-21 RX ADMIN — PREGABALIN 500 MICROGRAM(S): 225 CAPSULE ORAL at 13:21

## 2018-04-21 RX ADMIN — Medication 200 MILLIGRAM(S): at 18:01

## 2018-04-21 RX ADMIN — MONTELUKAST 10 MILLIGRAM(S): 4 TABLET, CHEWABLE ORAL at 13:21

## 2018-04-21 RX ADMIN — Medication 1 MILLIGRAM(S): at 13:16

## 2018-04-21 RX ADMIN — Medication 50 MILLIGRAM(S): at 14:23

## 2018-04-21 RX ADMIN — Medication 100 MILLIGRAM(S): at 21:02

## 2018-04-21 RX ADMIN — Medication 0.5 MILLIGRAM(S): at 18:01

## 2018-04-21 RX ADMIN — Medication 100 MILLIGRAM(S): at 14:18

## 2018-04-21 RX ADMIN — Medication 5.25 MICROGRAM(S)/KG/MIN: at 06:34

## 2018-04-21 RX ADMIN — HEPARIN SODIUM 5000 UNIT(S): 5000 INJECTION INTRAVENOUS; SUBCUTANEOUS at 21:02

## 2018-04-21 RX ADMIN — Medication 50 MILLIGRAM(S): at 21:01

## 2018-04-21 RX ADMIN — HEPARIN SODIUM 5000 UNIT(S): 5000 INJECTION INTRAVENOUS; SUBCUTANEOUS at 14:18

## 2018-04-21 RX ADMIN — SODIUM CHLORIDE 1000 MILLILITER(S): 9 INJECTION, SOLUTION INTRAVENOUS at 09:14

## 2018-04-21 RX ADMIN — Medication 250 MILLIGRAM(S): at 18:01

## 2018-04-21 RX ADMIN — SODIUM CHLORIDE 1000 MILLILITER(S): 9 INJECTION, SOLUTION INTRAVENOUS at 04:04

## 2018-04-21 RX ADMIN — Medication 50 MILLIGRAM(S): at 04:30

## 2018-04-21 RX ADMIN — FINASTERIDE 5 MILLIGRAM(S): 5 TABLET, FILM COATED ORAL at 13:21

## 2018-04-21 RX ADMIN — BUDESONIDE AND FORMOTEROL FUMARATE DIHYDRATE 2 PUFF(S): 160; 4.5 AEROSOL RESPIRATORY (INHALATION) at 18:02

## 2018-04-21 RX ADMIN — Medication 100 MILLIGRAM(S): at 05:41

## 2018-04-21 RX ADMIN — MIDODRINE HYDROCHLORIDE 5 MILLIGRAM(S): 2.5 TABLET ORAL at 15:08

## 2018-04-21 RX ADMIN — AZITHROMYCIN 250 MILLIGRAM(S): 500 TABLET, FILM COATED ORAL at 12:17

## 2018-04-21 RX ADMIN — MIDODRINE HYDROCHLORIDE 5 MILLIGRAM(S): 2.5 TABLET ORAL at 21:02

## 2018-04-21 RX ADMIN — Medication 81 MILLIGRAM(S): at 13:16

## 2018-04-21 RX ADMIN — TIOTROPIUM BROMIDE 1 CAPSULE(S): 18 CAPSULE ORAL; RESPIRATORY (INHALATION) at 12:45

## 2018-04-21 NOTE — H&P ADULT - FAMILY HISTORY
Family history of pulmonary embolism, father @ 49 yr old     Family history of heart attack     Sibling  Still living? No  FH: CAD (coronary artery disease), Age at diagnosis: Age Unknown

## 2018-04-21 NOTE — CONSULT NOTE ADULT - SUBJECTIVE AND OBJECTIVE BOX
OF PRESENT ILLNESS:   82y Male patient with past medical history of HTN, Asthma, CVA, seizures, CAD s/p 3 stents 2017 and PCI mLAD and dRCA with balloon angioplasty of D1 (2018) discharged 2018 who presents with mild chest discomfort, fatigue, diaphoresis, hypothermia (33 celcius), hypoglycemia (41 glucose) and asymptomatic hypotension (systolics 70s).   His symptoms improved with D50, steroids, and IVF.   . He normally uses blood pressure medications but given recent hypotension, they were all discontinued and he was started on midodrine prior to discharge.   Troponin 0.17. ECG without evdience of ischemia. Currently denying chest pain, shortness of breath, palpitations, orthopnea, syncope, dizziness, and mentating well. Extensively discussed code status in from of spouse and daughter and he is DNR; does not want CPR and ventilation.     REVIEW OF SYSTEMS:  CONSTITUTIONAL: weakness,  no   fevers      RESPIRATORY:  No    shortness of breath  CARDIOVASCULAR: No chest pain,   GASTROINTESTINAL: No abdominal  pain  NEUPHYSICAL EXAMINATION:  Vital Signs Last 24 Hrs  T(C): 36.4 (2018 09:00), Max: 36.4 (2018 12:52)  T(F): 97.6 (2018 09:00), Max: 97.6 (2018 09:00)  HR: 70 (2018 09:00) (60 - 80)  BP: 100/53 (2018 09:32) (73/44 - 122/52)  BP(mean): 58 (2018 09:00) (58 - 72)  RR: 19 (2018 08:34) (17 - 20)  SpO2: 100% (2018 09:32) (90% - 100%)  CAPILLARY BLOOD GLUCOSE      POCT Blood Glucose.: 89 mg/dL (2018 09:30)  POCT Blood Glucose.: 117 mg/dL (2018 06:30)  POCT Blood Glucose.: 138 mg/dL (2018 05:31)  POCT Blood Glucose.: 230 mg/dL (2018 04:24)  POCT Blood Glucose.: 56 mg/dL (2018 03:24)        GENERAL: NAD, well-groomed,  HEAD:  atraumatic, normocephalic  EYES: sclera anicteric  ENMT: mucous membranes moist  NECK: supple, No JVD  CHEST/LUNG: clear to auscultation bilaterally;    no      rales   ,   no rhonchi,   HEART: normal S1, S2  ABDOMEN: BS+, soft, ND, NT   EXTREMITIES:    no    edema    b/l LEs  NEURO: awake, ,     moves all extremities  SKIN: no     rash  LABS:                        9.2    6.2   )-----------( 164      ( 2018 03:26 )             27.7         140  |  107  |  24<H>  ----------------------------<  86  3.6   |  24  |  0.76    Ca    8.7      2018 03:26    TPro  6.1  /  Alb  2.5<L>  /  TBili  0.2  /  DBili  x   /  AST  32  /  ALT  35  /  AlkPhos  232<H>      PT/INR - ( 2018 03:26 )   PT: 10.5 sec;   INR: 0.96 ratio         PTT - ( 2018 03:26 )  PTT:30.6 sec  CARDIAC MARKERS ( 2018 03:26 )  x     / 0.17 ng/mL / x     / x     / x          Urinalysis Basic - ( 2018 05:15 )    Color: Yellow / Appearance: Clear / S.029 / pH: x  Gluc: x / Ketone: Negative  / Bili: Negative / Urobili: 1 mg/dL   Blood: x / Protein: 30 mg/dL / Nitrite: Negative   Leuk Esterase: Negative / RBC: 0-2 /HPF / WBC 0-2 /HPF   Sq Epi: x / Non Sq Epi: x / Bacteria: Few /HPF           @ 03:26  3.6  46 OF PRESENT ILLNESS:   82y Male patient with past medical history of HTN, Asthma, CVA, seizures, CAD s/p 3 stents 2017 and PCI mLAD and dRCA with balloon angioplasty of D1 (2018) discharged 2018,   who presents with mild chest discomfort, fatigue, diaphoresis, hypothermia (33 celcius), hypoglycemia (41 glucose) and asymptomatic hypotension (systolics 70s).   His symptoms improved with D50, steroids, and IVF.   . He normally uses blood pressure medications but given recent hypotension, they were all discontinued and he was started on midodrine prior to discharge.   Troponin 0.17. ECG without evdience of ischemia. Currently denying chest pain, shortness of breath, palpitations, orthopnea, syncope, dizziness, and mentating welll   pt  is  DNR;  now  wife at bedside, pt  very talkative     REVIEW OF SYSTEMS:  CONSTITUTIONAL: weakness,  no   fevers      RESPIRATORY:  No    shortness of breath  CARDIOVASCULAR: No chest pain, now   GASTROINTESTINAL: No abdominal  pain  NEUPHYSICAL EXAMINATION:  Vital Signs Last 24 Hrs  T(C): 36.4 (2018 09:00), Max: 36.4 (2018 12:52)  T(F): 97.6 (2018 09:00), Max: 97.6 (2018 09:00)  HR: 70 (2018 09:00) (60 - 80)  BP: 100/53 (2018 09:32) (73/44 - 122/52)  BP(mean): 58 (2018 09:00) (58 - 72)  RR: 19 (2018 08:34) (17 - 20)  SpO2: 100% (2018 09:32) (90% - 100%)  CAPILLARY BLOOD GLUCOSE      POCT Blood Glucose.: 89 mg/dL (2018 09:30)  POCT Blood Glucose.: 117 mg/dL (2018 06:30)  POCT Blood Glucose.: 138 mg/dL (2018 05:31)  POCT Blood Glucose.: 230 mg/dL (2018 04:24)  POCT Blood Glucose.: 56 mg/dL (2018 03:24)        GENERAL: NAD, well-groomed,  HEAD:  atraumatic, normocephalic  EYES: sclera anicteric  ENMT: mucous membranes moist  NECK: supple, No JVD  CHEST/LUNG: clear to auscultation bilaterally;    no      rales   ,   no rhonchi,   HEART: normal S1, S2  ABDOMEN: BS+, soft, ND, NT   EXTREMITIES:    no    edema    b/l LEs  NEURO: awake, ,     moves all extremities  SKIN: no     rash  LABS:                        9.2    6.2   )-----------( 164      ( 2018 03:26 )             27.7         140  |  107  |  24<H>  ----------------------------<  86  3.6   |  24  |  0.76    Ca    8.7      2018 03:26    TPro  6.1  /  Alb  2.5<L>  /  TBili  0.2  /  DBili  x   /  AST  32  /  ALT  35  /  AlkPhos  232<H>      PT/INR - ( 2018 03:26 )   PT: 10.5 sec;   INR: 0.96 ratio         PTT - ( 2018 03:26 )  PTT:30.6 sec  CARDIAC MARKERS ( 2018 03:26 )  x     / 0.17 ng/mL / x     / x     / x          Urinalysis Basic - ( 2018 05:15 )    Color: Yellow / Appearance: Clear / S.029 / pH: x  Gluc: x / Ketone: Negative  / Bili: Negative / Urobili: 1 mg/dL   Blood: x / Protein: 30 mg/dL / Nitrite: Negative   Leuk Esterase: Negative / RBC: 0-2 /HPF / WBC 0-2 /HPF   Sq Epi: x / Non Sq Epi: x / Bacteria: Few /HPF           @ 03:26  3.6  46

## 2018-04-21 NOTE — ED PROVIDER NOTE - OBJECTIVE STATEMENT
Pt is a 82 Y M with hx of HTN, Asthma, CVA, seizures who presents with chest pain that began 2 hours prior to arrival. EMS reports that CP had resolved when they arrived to pt. Pt had a cardiac stent placed last week and was just discharged from the hospital on Friday. When EMS arrived they found pt to be diaphoretic and pale, finger stick was 41, 1 AMP of D50 was given and pts symptoms improved somewhat. He was hypotensive for EMS as well. Pt is a 82 Y M with hx of HTN, Asthma, CVA, seizures who presents with chest pain that began 2 hours prior to arrival. EMS reports that CP had resolved when they arrived to pt. Pt had a cardiac stent placed last week and was just discharged from the hospital on Friday. When EMS arrived they found pt to be diaphoretic and pale, finger stick was 41, 1 AMP of D50 was given and pts symptoms improved somewhat. He was hypotensive for EMS as well. Review of charts he was on some stress dose steroids right before discharge and was also started on midodrine before DC. He denies fever, nausea, vomiting, dysuria, diarrhea. He says that he has a chronic cough that has not changed. He is no longer having any chest pain or shortness of breath. Pt is a 82 Y M with hx of HTN, Asthma, CVA, seizures who presents with chest pain that began 2 hours prior to arrival. EMS reports that CP had resolved when they arrived to pt. Pt had a cardiac stent placed last week and was just discharged from the hospital on Friday. When EMS arrived they found pt to be diaphoretic and pale, finger stick was 41, 1 AMP of D50 was given and pts symptoms improved somewhat. He was hypotensive for EMS as well. Review of charts he was on some stress dose steroids right before discharge and was also started on midodrine before DC. He denies fever, nausea, vomiting, dysuria, diarrhea. He says that he has a chronic cough that has not changed. He is no longer having any chest pain or shortness of breath.    Attendinyo male presents with weakness and fatigue.  No vomiting or nausea.  was just discharged from hospital yesterday.  No fever.  Pt on chronic steroids for RA.

## 2018-04-21 NOTE — CHART NOTE - NSCHARTNOTEFT_GEN_A_CORE
:    INDICATION: Shock    PROCEDURE:  [x ] LIMITED ECHO  [x ] LIMITED CHEST  [ ] LIMITED RETROPERITONEAL  [ ] LIMITED ABDOMINAL  [x ] LIMITED DVT  [ ] NEEDLE GUIDANCE VASCULAR  [ ] NEEDLE GUIDANCE THORACENTESIS  [ ] NEEDLE GUIDANCE PARACENTESIS  [ ] NEEDLE GUIDANCE PERICARDIOCENTESIS  [ ] OTHER    FINDINGS: Chest: Small R Pleff, simple fluid. No consolidation.  LLL B lines with lumpy bumpy pleura.  Anterior A-Lines B/L.   TTE: Good over all LVF with normal RV to LV size ration. LVOT VTI in shock state was 24.   DVT: B/L distorted anatomy due to recent caths.   However there were abnormal lymphnodes on the left as well as a poorly compressible left vein.       INTERPRETATION: Chest: Possible LLL pna but, unclear as pt only had focal Blines.  Chronic small R PLEFF.  TTE: normal LVF shock is not cardiogenic.  DVT: abnorml anatomy not allowing for optimal scan, Will need offical DVT evaluation.

## 2018-04-21 NOTE — H&P ADULT - RS GEN PE MLT RESP DETAILS PC
normal/airway patent/good air movement/breath sounds equal/respirations non-labored/no chest wall tenderness/clear to auscultation bilaterally

## 2018-04-21 NOTE — H&P ADULT - ASSESSMENT
84 y/o M former smoker w/ hx of HTN, TIA, Seizures, RA, CAD s/p 6 stents most recently 4/17/18, Esophageal CA s/p chemo/RT and esophagogastrectomy recent admission for cardiac cath now presenting for altered mental status at home. Found ot be hypotensive in ED requiring pressors     Neuro: Pt currently AOX3. No focal deficits   -Neuro checks q4    CV: PT currently in shock state. Etiology possibly related to rapid taper of steroids given chronic steroids vs hypovolemia from dehydration vs distrubtive from infection  -c/w LEvophed. Titrate off pressors as tolerated  -r/o Adrenal insufficiency  -c/w Hydrocortisone 100 q8 IV push daily  -PT volume resuscitated    -Will treat possible infection   -c/w ASA and Plavix   -Trend troponins   -Hold anti hypertensives     Pulm:Pt currently requring O2 84 y/o M former smoker w/ hx of HTN, TIA, Seizures, RA, CAD s/p 6 stents most recently 4/17/18, Esophageal CA s/p chemo/RT and esophagogastrectomy recent admission for cardiac cath now presenting for altered mental status at home. Found ot be hypotensive in ED requiring pressors     Neuro: Pt currently AOX3. No focal deficits. Known seizure d/o  -Neuro checks q4  -c/w Dilantin 200 BID    CV: PT currently in shock state. Etiology possibly related to rapid taper of steroids given chronic steroids vs hypovolemia from dehydration vs distrubtive from infection vs obstrutive shock from PE  -c/w Levophed. Titrate off pressors as tolerated  -c/w Hydrocortisone 100 q8 IV push daily  -PT volume resuscitated    -Will treat possible infection   -c/w ASA and Plavix   -Trend troponins   -Hold anti hypertensives   -Duplex scan to r/o DVT.  if persistently hypoxic and hypotensive. Will send for CTA    Pulm:Pt currently requring O2 fpr unclear reasons possibly PE vs Atelectasis  -Supplmental O2 PRN  -Will start symbicort BID   -c/w spiriva   -Duonebs PRN     Renal:No issues  -Monitor I/Os    ID:Pt with cough but no obvious signs of PNA   -c/w Vanc,Aztrenoam, and Azithromycin  -Check urien legionella  -F/u blood and urine cultures  -Check RVP     GI:No melena or other signs of bleeding   -Will reinitiate PO diet with ensure    Heme:Normocytic anemia likely chronic disease  -Monitor CBC    Endo:Concern for adrenal insufficiency   -Treat with Hydrocortisone 100q8 IV  -FS q6     Psych: Known anxiety  -c/w Xanax .5 BID     DVT PPX:HSQ    Dispo:full code pending further w/u

## 2018-04-21 NOTE — ED PROVIDER NOTE - PMH
Anxiety    Asthma    BCC (basal cell carcinoma)  skin  BPH (benign prostatic hyperplasia)    Cerebrovascular accident (CVA)    Chronic allergic rhinitis    Coronary artery disease  s/p 3 stents on June 30, 2017 at [place] Dr. Lavelle Reid  Esophagus cancer    Former smoker, stopped smoking in distant past    HTN (hypertension)    Hyperlipidemia    OA (osteoarthritis) of knee  right  Rheumatoid arthritis    Seizure disorder  1 episode approximately 15 yrs ago

## 2018-04-21 NOTE — ED ADULT NURSE NOTE - PMH
Anxiety    Asthma    BCC (basal cell carcinoma)  skin  BPH (benign prostatic hyperplasia)    Cerebrovascular accident (CVA)    Chronic allergic rhinitis    Coronary artery disease  s/p 3 stents on June 30, 2017 at [facility] Dr. Lavelle Reid  Esophagus cancer    Former smoker, stopped smoking in distant past    HTN (hypertension)    Hyperlipidemia    OA (osteoarthritis) of knee  right  Rheumatoid arthritis    Seizure disorder  1 episode approximately 15 yrs ago

## 2018-04-21 NOTE — H&P ADULT - NEGATIVE CARDIOVASCULAR SYMPTOMS
no orthopnea/no palpitations/no paroxysmal nocturnal dyspnea/no peripheral edema/no dyspnea on exertion

## 2018-04-21 NOTE — ED PROVIDER NOTE - CARE PLAN
Principal Discharge DX:	Adrenal insufficiency  Secondary Diagnosis:	Chest pain  Secondary Diagnosis:	Hypotension

## 2018-04-21 NOTE — ED ADULT NURSE REASSESSMENT NOTE - NS ED NURSE REASSESS COMMENT FT1
Patient temperature 92.7 rectally. Warming blanket (bear hugger) applied to patient. Will continue to monitor and patient safety maintained.
Dr Grayson aware of patient's continuous low BPs. Will continue to monitor and patient safety maintained.
MICU at bedside.
Patient reassessed- remains A&Ox3; Patient denies pain and denies chest discomfort. Norepinephrine started. Will continue to monitor and patient safety maintained. Family at the bedside.
Patient remains hypotensive. Dr Grayson aware.
levophed stopped.
patient resting on cardiac monitor, on blanket warmer, pending disposition. Wife at bedside.

## 2018-04-21 NOTE — CONSULT NOTE ADULT - SUBJECTIVE AND OBJECTIVE BOX
CHIEF COMPLAINT:Patient is a 82y old  Male who presents with a chief complaint of hypotension.    HPI:pt with hx ashd/htn,ra, s/p stent to LAD and ptca of DX  with hypotension since the procedure and was treated with midodrine and all bp meds was stopped since then.  minimal chest pian, no sob.  in er pt is hypothermic and hypotensive on pressors.      PAST MEDICAL & SURGICAL HISTORY:  Coronary artery disease: s/p 3 stents on June 30, 2017 at [place] Dr. 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 1000 milliLiter(s) (1000 mL/Hr) IV Continuous <Continuous>  enoxaparin Injectable 40 milliGRAM(s) SubCutaneous every 24 hours  hydrocortisone sodium succinate Injectable 100 milliGRAM(s) IV Push every 8 hours  norepinephrine Infusion 0.05 MICROgram(s)/kG/Min (5.25 mL/Hr) IV Continuous <Continuous>    MEDICATIONS  (PRN):  acetaminophen   Tablet 650 milliGRAM(s) Oral every 6 hours PRN For Temp greater than 38 C (100.4 F)      FAMILY HISTORY:  Family history of heart attack  Family history of pulmonary embolism: father @ 49 yr old  FH: CAD (coronary artery disease) (Sibling)      SOCIAL HISTORY:    [ ] Non-smoker  [ ] Smoker  [ ] Alcohol    Allergies    penicillin (Rash)    Intolerances    	    REVIEW OF SYSTEMS:  CONSTITUTIONAL: No fever, weight loss, or fatigue  EYES: No eye pain, visual disturbances, or discharge  ENT:  No difficulty hearing, tinnitus, vertigo; No sinus or throat pain  NECK: No pain or stiffness  RESPIRATORY: No cough, wheezing, chills or hemoptysis; + Shortness of Breath  CARDIOVASCULAR: No chest pain, palpitations, passing out, dizziness, or leg swelling  GASTROINTESTINAL: No abdominal or epigastric pain. No nausea, vomiting, or hematemesis; No diarrhea or constipation. No melena or hematochezia.  GENITOURINARY: No dysuria, frequency, hematuria, or incontinence  NEUROLOGICAL: No headaches, memory loss, loss of strength, numbness, or tremors  SKIN: No itching, burning, rashes, or lesions   LYMPH Nodes: No enlarged glands  ENDOCRINE: No heat or cold intolerance; No hair loss  MUSCULOSKELETAL: No joint pain or swelling; No muscle, back, or extremity pain  PSYCHIATRIC: No depression, anxiety, mood swings, or difficulty sleeping  HEME/LYMPH: No easy bruising, or bleeding gums  ALLERGY AND IMMUNOLOGIC: No hives or eczema	    [ ] All others negative	  [ ] Unable to obtain    PHYSICAL EXAM:  T(C): 36.4 (04-21-18 @ 09:00), Max: 36.4 (04-20-18 @ 12:52)  HR: 70 (04-21-18 @ 09:00) (60 - 80)  BP: 100/53 (04-21-18 @ 09:32) (73/44 - 122/52) on pressor  RR: 19 (04-21-18 @ 08:34) (17 - 20)  SpO2: 100% (04-21-18 @ 09:32) (90% - 100%)  Wt(kg): --  I&O's Summary      Appearance: Normal	  HEENT:   Normal oral mucosa, PERRL, EOMI	  Lymphatic: No lymphadenopathy  Cardiovascular: Normal S1 S2, + JVD, + murmurs, No edema  Respiratory: Lungs clear to auscultation	  Psychiatry: A & O x 3, Mood & affect appropriate  Gastrointestinal:  Soft, Non-tender, + BS	  Skin: No rashes, No ecchymoses, No cyanosis	  Neurologic: Non-focal  Extremities: Normal range of motion, No clubbing, cyanosis or edema  Vascular: Peripheral pulses palpable 2+ bilaterally    TELEMETRY: 	    ECG:  	  RADIOLOGY:  OTHER: 	  	  LABS:	 	    CARDIAC MARKERS:  CARDIAC MARKERS ( 21 Apr 2018 03:26 )  x     / 0.17 ng/mL / x     / x     / x                                  9.2    6.2   )-----------( 164      ( 21 Apr 2018 03:26 )             27.7     04-21    140  |  107  |  24<H>  ----------------------------<  86  3.6   |  24  |  0.76    Ca    8.7      21 Apr 2018 03:26    TPro  6.1  /  Alb  2.5<L>  /  TBili  0.2  /  DBili  x   /  AST  32  /  ALT  35  /  AlkPhos  232<H>  04-21    proBNP:   Lipid Profile:   HgA1c:   TSH:   PT/INR - ( 21 Apr 2018 03:26 )   PT: 10.5 sec;   INR: 0.96 ratio         PTT - ( 21 Apr 2018 03:26 )  PTT:30.6 sec    PREVIOUS DIAGNOSTIC TESTING:    < from: Transthoracic Echocardiogram (09.22.17 @ 15:29) >  1. Mitral annularcalcification, otherwise normal mitral  valve. Minimal mitral regurgitation.  2. Aortic valve leaflet morphology not well visualized.  Peak transaortic valve gradient equals 27 mm Hg, mean  transaortic valve gradient equals 12 mm Hg, consistent with  probable mild aortic stenosis. Mild aortic regurgitation.  3. Endocardium not well visualized; grossly normal left  ventricular systolic function.  4. The right ventricle is not well visualized; grossly  normal right ventricular systolic function.  5. Estimated right ventricular systolic pressure equals 44  mm Hg, assuming right atrial pressure equals 10 mm Hg,  consistent with mild pulmonary hypertension.    < end of copied text >

## 2018-04-21 NOTE — ED ADULT NURSE NOTE - OBJECTIVE STATEMENT
82 y.o M presents to the ED from home via EMS c/o confusion. Patient is awake, alert and speaking coherently. Patient states he was just discharged home yesterday 4/20, after stent surgery. Patient states he was uncomfortable at home, feeling some chest pressure and states he felt confused. EMS was called and they did a glucose and it was 40- as per EMS the patient was given 1 L of NS and given 1 amp of dextrose. Patient presents A&Ox3 and temp 93.7 rectally; denies SOB, lungs clear- patient on 2L NC. 82 y.o M presents to the ED from home via EMS c/o confusion. Patient is awake, alert and speaking coherently. Patient states he was just discharged home yesterday 4/20, after stent surgery. (1 stent placed 4/16 and a second placed on 4/17). Patient states he was uncomfortable at home, feeling some chest pressure and states he felt confused. EMS was called and they did a POC glucose and it was 40- as per EMS "the patient was given 1 L of NS and given 1 amp of dextrose". No PMHx of diabetes. Patient presents A&Ox3 and temp 93.7 rectally; denies SOB but SPO2 90 % on RA on arrival. (patient placed on 2 L nc); patient has chronic productive cough with clear sputum; patient denies nausea and vomiting.

## 2018-04-21 NOTE — H&P ADULT - ATTENDING COMMENTS
Pt seen and examined. 83 M former smoker w/ h/o HTN, TIA, Seizures, RA, CAD s/p 6 stents most recently 4/17/18, Esophageal CA s/p chemo/RT and esophagogastrectomy, with hospitalization complicated by hypotension requiring stress dose steroids which were transitioned to PO prednisone, discharged on home on 4/20 now presenting with chest pressure, mild hypoxia and shock state of unclear etiology.  - Possible septic shock vs adrenal insufficiency due to rapid steroid taper. Will check RVP, Bcxs, UA/UCx  - Empiric broad spectrum coverage with Vanc/ Zosyn/ Azithro for now  - Check urine legionella antigen  - Vasopressor support with levophed gtt to keep MAP>65  - Hydrocortisone 100 mg Q8H IV as he was on a prednisone taper at home  - Repeat EKG and follow serial cardiac enzymes  - Bedside point of care LE CUS study inconclusive. Will check b/l LE dopplers  - If any worsening of hypotension/ hypoxia will require a CTPA to r/o PE  - Guarded prognosis  - Critical Care Time Spent: 40 mins

## 2018-04-21 NOTE — CONSULT NOTE ADULT - SUBJECTIVE AND OBJECTIVE BOX
MRN-[id number]    CHIEF COMPLAINT:  Hypotension/Chest Discomfort/Fatigue    HISTORY OF PRESENT ILLNESS:  MEJIA LATIF is a 82y Male patient with past medical history of     Allergies    penicillin (Rash)    Intolerances    	    PAST MEDICAL & SURGICAL HISTORY:  Coronary artery disease: s/p 3 stents on June 30, 2017 at [facility] Dr. Lavelle Reid  OA (osteoarthritis) of knee: right  Former smoker, stopped smoking in distant past  Rheumatoid arthritis  Seizure disorder: 1 episode approximately 15 yrs ago  Asthma  Hyperlipidemia  BPH (benign prostatic hyperplasia)  HTN (hypertension)  Esophagus cancer  Chronic allergic rhinitis  BCC (basal cell carcinoma): skin  Cerebrovascular accident (CVA)  Anxiety  Anastomotic leak following esophagectomy: [facility] logan esophagectomy  History of tonsillectomy  H/O heart artery stent: June 30, 2017  Knee arthropathy: left  History of total hip replacement: left  History of hernia repair      FAMILY HISTORY:  Family history of heart attack  Family history of pulmonary embolism: father @ 49 yr old  FH: CAD (coronary artery disease) (Sibling)      SOCIAL HISTORY:    [ ] Non-smoker  [ ] Smoker  [ ] Alcohol    REVIEW OF SYSTEMS:  CONSTITUTIONAL: No fever, weight loss, or fatigue  EYES: No eye pain, visual disturbances, or discharge  ENMT:  No difficulty hearing, tinnitus, vertigo; No sinus or throat pain  NECK: No pain or stiffness  RESPIRATORY: No cough, wheezing, chills or hemoptysis; No Shortness of Breath  CARDIOVASCULAR: No chest pain, palpitations, passing out, dizziness, or leg swelling  GASTROINTESTINAL: No abdominal or epigastric pain. No nausea, vomiting, or hematemesis; No diarrhea or constipation. No melena or hematochezia.  GENITOURINARY: No dysuria, frequency, hematuria, or incontinence  NEUROLOGICAL: No headaches, memory loss, loss of strength, numbness, or tremors  SKIN: No itching, burning, rashes, or lesions   LYMPH Nodes: No enlarged glands  ENDOCRINE: No heat or cold intolerance; No hair loss  MUSCULOSKELETAL: No joint pain or swelling; No muscle, back, or extremity pain  PSYCHIATRIC: No depression, anxiety, mood swings, or difficulty sleeping  HEME/LYMPH: No easy bruising, or bleeding gums  ALLERY AND IMMUNOLOGIC: No hives or eczema	    [ ] All others negative	  [ ] Unable to obtain    I&O's Summary      PHYSICAL EXAM:  Vital Signs Last 24 Hrs  T(C): 34.6 (21 Apr 2018 05:36), Max: 36.4 (20 Apr 2018 12:52)  T(F): 94.2 (21 Apr 2018 05:36), Max: 97.5 (20 Apr 2018 12:52)  HR: 72 (21 Apr 2018 05:36) (60 - 80)  BP: 75/65 (21 Apr 2018 05:36) (73/44 - 122/52)  BP(mean): --  RR: 20 (21 Apr 2018 05:36) (17 - 20)  SpO2: 97% (21 Apr 2018 05:36) (90% - 100%)  Appearance: Normal	  HEENT:   Normal oral mucosa, PERRL, EOMI	  Lymphatic: No lymphadenopathy  Cardiovascular: Normal S1 S2, No JVD, No murmurs, No edema  Respiratory: Lungs clear to auscultation	  Psychiatry: A & O x 3, Mood & affect appropriate  Gastrointestinal:  Soft, Non-tender, + BS	  Skin: No rashes, No ecchymoses, No cyanosis	  Neurologic: Non-focal  Extremities: Normal range of motion, No clubbing, cyanosis or edema  Vascular: Peripheral pulses palpable 2+ bilaterally    MEDICATIONS:  MEDICATIONS  (STANDING):  dextrose 5% + sodium chloride 0.45%. 1000 milliLiter(s) (1000 mL/Hr) IV Continuous <Continuous>  norepinephrine Infusion 0.05 MICROgram(s)/kG/Min (5.25 mL/Hr) IV Continuous <Continuous>    MEDICATIONS  (PRN):      LABS:	 	  CBC Full  -  ( 21 Apr 2018 03:26 )  WBC Count : 6.2 K/uL  Hemoglobin : 9.2 g/dL  Hematocrit : 27.7 %  Platelet Count - Automated : 164 K/uL  Mean Cell Volume : 95.5 fl  Mean Cell Hemoglobin : 31.8 pg  Mean Cell Hemoglobin Concentration : 33.3 gm/dL  Auto Neutrophil # : 4.3 K/uL  Auto Lymphocyte # : 1.1 K/uL  Auto Monocyte # : 0.8 K/uL  Auto Eosinophil # : 0.1 K/uL  Auto Basophil # : 0.0 K/uL  Auto Neutrophil % : 68.5 %  Auto Lymphocyte % : 16.9 %  Auto Monocyte % : 13.1 %  Auto Eosinophil % : 1.4 %  Auto Basophil % : 0.0 %    04-21    140  |  107  |  24<H>  ----------------------------<  86  3.6   |  24  |  0.76  04-20    139  |  106  |  22  ----------------------------<  80  4.0   |  26  |  0.71    Ca    8.7      21 Apr 2018 03:26  Ca    9.1      20 Apr 2018 06:07    TPro  6.1  /  Alb  2.5<L>  /  TBili  0.2  /  DBili  x   /  AST  32  /  ALT  35  /  AlkPhos  232<H>  04-21    PT/INR - ( 21 Apr 2018 03:26 )   PT: 10.5 sec;   INR: 0.96 ratio         PTT - ( 21 Apr 2018 03:26 )  PTT:30.6 sec  CARDIAC MARKERS ( 21 Apr 2018 03:26 )  x     / 0.17 ng/mL / x     / x     / x        proBNP:   Lipid Profile:   HgA1c:   TSH:     TELEMETRY: 	    ECG:  	  RADIOLOGY:  OTHER: 	    CARDIAC TESTING/STUDIES:    [ ] Echocardiogram:  [ ]  Catheterization:  [ ] Stress Test:  	  	  ASSESSMENT/PLAN: 	      Jonna Coyle MD, MPH, ALLY  Cardiovascular Specialist Attending  [facility]  C: [phone]  E: [email or web address]  (Cardiology Nocturnist cell number available 7 pm - 7 am every night; available daytime week days for follow-up only; daytime weekends covered by general cardiology consult service) MRN-[id number]    CHIEF COMPLAINT:  Hypotension/Chest Discomfort/Fatigue    HISTORY OF PRESENT ILLNESS:  MEJIA LATIF is a 82y Male patient with past medical history of HTN, Asthma, CVA, seizures, CAD s/p 3 stents 6/2017 and PCI mLAD and dRCA with balloon angioplasty of D1 (4/18/2018) discharged 4/19/2018 who presents with mild chest discomfort, fatigue, diaphoresis, hypothermia (33 celcius), hypoglycemia (41 glucose) and asymptomatic hypotension (systolics 70s). His symptoms improved with D50, steroids, and IVF. During his prior hospitalization, he was treated with high dose hydrocortisone 50 mgq8h since he is on baseline steroid of RA. He normally uses blood pressure medications but given recent hypotension, they were all discontinued and he was started on midodrine prior to discharge. Troponin 0.17. ECG without evdience of ischemia. Currently denying chest pain, shortness of breath, palpitations, orthopnea, syncope, dizziness, and mentating well. Extensively discussed code status in from of spouse and daughter and he is DNR; does not want CPR and ventilation. Cardiology consulted for further management.     Allergies  penicillin (Rash)    PAST MEDICAL & SURGICAL HISTORY:  Coronary artery disease: s/p 3 stents on June 30, 2017 at [place] Dr. Lavelle Reid  OA (osteoarthritis) of knee: right  Former smoker, stopped smoking in distant past  Rheumatoid arthritis  Seizure disorder: 1 episode approximately 15 yrs ago  Asthma  Hyperlipidemia  BPH (benign prostatic hyperplasia)  HTN (hypertension)  Esophagus cancer  Chronic allergic rhinitis  BCC (basal cell carcinoma): skin  Cerebrovascular accident (CVA)  Anxiety  Anastomotic leak following esophagectomy: [place] logan esophagectomy  History of tonsillectomy  H/O heart artery stent: June 30, 2017  Knee arthropathy: left  History of total hip replacement: left  History of hernia repair    FAMILY HISTORY:  Family history of heart attack  Family history of pulmonary embolism: father @ 49 yr old  FH: CAD (coronary artery disease) (Sibling)    SOCIAL HISTORY:    Non-smoker    REVIEW OF SYSTEMS:  CONSTITUTIONAL: No fever, weight loss, Positive fatigue and hypothermia  EYES: No eye pain, visual disturbances, or discharge  ENMT:  No tinnitus, vertigo; No sinus or throat pain.  NECK: No pain or stiffness  RESPIRATORY: No wheezing, chills or hemoptysis; No Shortness of Breath. Positive chronic cough  CARDIOVASCULAR: No palpitations, passing out, or leg swelling. Positive dizziness and mild chest discomfort  GASTROINTESTINAL: No abdominal or epigastric pain. No nausea, vomiting, or hematemesis  GENITOURINARY: No dysuria, frequency, hematuria  NEUROLOGICAL: No headaches, memory loss, loss of strength  SKIN: No itching, burning, rashes, or lesions   ENDOCRINE: Positive cold intolerance   MUSCULOSKELETAL: Positive joint pain or swelling  PSYCHIATRIC: No depression, anxiet  HEME/LYMPH: No easy bruising, or bleeding gums  ALLERY AND IMMUNOLOGIC: No hives or eczema    PHYSICAL EXAM:  Vital Signs Last 24 Hrs  T(C): 34.6 (21 Apr 2018 05:36), Max: 36.4 (20 Apr 2018 12:52)  T(F): 94.2 (21 Apr 2018 05:36), Max: 97.5 (20 Apr 2018 12:52)  HR: 72 (21 Apr 2018 05:36) (60 - 80)  BP: 75/65 (21 Apr 2018 05:36) (73/44 - 122/52)  RR: 20 (21 Apr 2018 05:36) (17 - 20)  SpO2: 97% (21 Apr 2018 05:36) (90% - 100%)    Appearance: Normal	  HEENT:   Normal oral mucosa  Lymphatic: No lymphadenopathy  Cardiovascular: Normal S1 S2, No edema, RRR  Respiratory: Lungs clear to auscultation, reduced breath sounds.   Psychiatry: A & O x 3  Gastrointestinal:  Soft, Non-tender	  Skin: No rashes, No ecchymoses, No cyanosis	  Neurologic: Non-focal  Extremities: No clubbing, cyanosis or edema  Vascular: Peripheral pulses palpable 2+ bilaterally    MEDICATIONS:  MEDICATIONS  (STANDING):  dextrose 5% + sodium chloride 0.45%. 1000 milliLiter(s) (1000 mL/Hr) IV Continuous <Continuous>  norepinephrine Infusion 0.05 MICROgram(s)/kG/Min (5.25 mL/Hr) IV Continuous <Continuous>    LABS:	 	  CBC Full  -  ( 21 Apr 2018 03:26 )  WBC Count : 6.2 K/uL  Hemoglobin : 9.2 g/dL  Hematocrit : 27.7 %  Platelet Count - Automated : 164 K/uL  Mean Cell Volume : 95.5 fl  Mean Cell Hemoglobin : 31.8 pg  Mean Cell Hemoglobin Concentration : 33.3 gm/dL  Auto Neutrophil # : 4.3 K/uL  Auto Lymphocyte # : 1.1 K/uL  Auto Monocyte # : 0.8 K/uL  Auto Eosinophil # : 0.1 K/uL  Auto Basophil # : 0.0 K/uL  Auto Neutrophil % : 68.5 %  Auto Lymphocyte % : 16.9 %  Auto Monocyte % : 13.1 %  Auto Eosinophil % : 1.4 %  Auto Basophil % : 0.0 %    04-21    140  |  107  |  24<H>  ----------------------------<  86  3.6   |  24  |  0.76    04-20    139  |  106  |  22  ----------------------------<  80  4.0   |  26  |  0.71    Ca    8.7      21 Apr 2018 03:26  Ca    9.1      20 Apr 2018 06:07    TPro  6.1  /  Alb  2.5<L>  /  TBili  0.2  /  DBili  x   /  AST  32  /  ALT  35  /  AlkPhos  232<H>  04-21    PT/INR - ( 21 Apr 2018 03:26 )   PT: 10.5 sec;   INR: 0.96 ratio      PTT - ( 21 Apr 2018 03:26 )  PTT:30.6 sec  CARDIAC MARKERS ( 21 Apr 2018 03:26 )  x     / 0.17 ng/mL / x     / x     / x        TELEMETRY: NSR  ECG:  RBBB, left axis deviation, NSR    CARDIAC TESTING/STUDIES:    Catheterization 4/16/2018  PROCEDURE:  --  Left coronary angiography.  --  Right coronary angiography.  --  Sonosite - Diagnostic.  --  Sheath Exchange for Intervention.  --  Intervention on distal RCA: drug-eluting stent.    CORONARY VESSELS: The coronary circulation is right dominant.  LM:   --  LM: Normal.  LAD:   --  Mid LAD: There was a 90 % stenosis.  --  D1: There was a 99 % stenosis.  CX:   --  Mid circumflex: There was a 20 % stenosis.  RCA:   --  Distal RCA: There was a 90 % stenosis.    COMPLICATIONS: There were no complications.    INTERVENTIONAL RECOMMENDATIONS: ASA and Plavix for 3 mths    Catheterization 4/17/2018  PROCEDURE:  --  Sonosite - Interventional.  --  Intervention on mid LAD: drug-eluting stent.  --  Intervention on D1: balloon angioplasty.    CORONARY VESSELS:  LAD:   --  Mid LAD: There was a 90 % stenosis.  --  D1: There was a 99 % stenosis.    COMPLICATIONS: There were no complications.    INTERVENTIONAL RECOMMENDATIONS: ASA and Plavix for 3 mths    ECHO 9/22/2017  CONCLUSIONS:  1. Mitral annular calcification, otherwise normal mitral  valve. Minimal mitral regurgitation.  2. Aortic valve leaflet morphology not well visualized.  Peak transaortic valve gradient equals 27 mm Hg, mean  transaortic valve gradient equals 12 mm Hg, consistent with  probable mild aortic stenosis. Mild aortic regurgitation.  3. Endocardium not well visualized; grossly normal left  ventricular systolic function.  4. The right ventricle is not well visualized; grossly  normal right ventricular systolic function.  5. Estimated right ventricular systolic pressure equals 44  mm Hg, assuming right atrial pressure equals 10 mm Hg,  consistent with mild pulmonary hypertension.    ASSESSMENT/PLAN: 	  82y Male patient with past medical history of HTN, Asthma, CVA, seizures, CAD s/p 3 stents 6/2017 and PCI mLAD and dRCA with balloon angioplasty of D1 (4/18/2018) discharged 4/19/2018 who presents with mild chest discomfort, fatigue, diaphoresis, hypothermia (33 celsius hypoglycemia (41 glucose) and asymptomatic hypotension (systolics 70s).    1) Hypotension  Multifactorial: high dose steroid rapid taper, ?sepsis (hypothermia, hypotension), hypoglycemia (despite high dose steroids)  Clinical picture does not suggest acute stent thrombosis but rather multiple medical issues.   H/H stable; no obvious signs of bleed post procedure.     ·	Pending ECHO to assess wall motion abnormalities, LVEF.   ·	High dose steroids with slow taper  ·	Continue broad spectrum antibiotics, await blood cultures; may not mount robust immunologic response secondary to chronic RA/steroids.   ·	Hypoglycemic workup.   ·	Although I dislike the use of midodrine for transient blood pressure support, use as needed.     2) CAD   s/p multiple stents most recently mLAD and dRCA with balloon angioplasty of D1 (4/18/2018)   Troponin 0.17  ECG without acute ischemia; relatively unchanged from prior.     ·	Trend troponin.   ·	Pending ECHO to assess wall motion abnormalities, LVEF.   ·	Continue medical management with aspirin 81 mg daily, clopidogrel 75 mg daily, simvastatin 40 mg nightly.     3) HTN   Hypotensive issues at this time; unknown etiology.   Discontinued all antihypertensive medications.     ·	Plan as above.     Jonna Coyle MD, MPH, ALLY  Cardiovascular Specialist Attending  [facility]  C: [phone]  E: [email or web address]  (Cardiology Nocturnist cell number available 7 pm - 7 am every night; available daytime week days for follow-up only; daytime weekends covered by general cardiology consult service)

## 2018-04-21 NOTE — H&P ADULT - NEGATIVE ENMT SYMPTOMS
no nasal obstruction/no tinnitus/no vertigo/no ear pain/no hearing difficulty/no sinus symptoms/no nasal congestion/no nasal discharge/no post-nasal discharge

## 2018-04-21 NOTE — H&P ADULT - HISTORY OF PRESENT ILLNESS
84 y/o M, smoker, w/ hx of HTN, TIA, Seizures, RA, CAD s/p 6 stents most recently 4/17/18, Esophageal CA s/p chemo/RT and esophagogastrectomy recent admission for cardiac cath now presenting for altered mental status at home. As per patient and family he was discharged 1 day prior to admission and was doing well until later in the day when his wife noticed he became more lethargic and had difficulty answering questions. He also noticed that he was having substernal left sided chest pressure radiating down his left arm which lasted for a few minutes. HE was concerned given the recent stent that this was another cardiac event and decided to come into the hospital. He denied any fevers/chills,nausea/vomiting,palpiotations,sweating,abdominal pain,diarrhea,melena. PT was recently hypotensive during previous admission and was started on hydrocortisone. PT was trnasitioned from hydrocoritosne to prednisone 20mg     In the ED VS T:92.7 BP:98/50 P:80 RR:20 O2:100% on 2L. PT was given vanc 1g, 1LNS bolus, aztroenam 1g and hydrocortisone 100mg IV 82 y/o M former smoker w/ hx of HTN, TIA, Seizures, RA, CAD s/p 6 stents most recently 4/17/18, Esophageal CA s/p chemo/RT and esophagogastrectomy recent admission for cardiac cath now presenting for altered mental status at home. As per patient and family he was discharged 1 day prior to admission and was doing well until later in the day when his wife noticed he became more lethargic and had difficulty answering questions. He also noticed that he was having substernal left sided chest pressure radiating down his left arm which lasted for a few minutes. HE was concerned given the recent stent that this was another cardiac event and decided to come into the hospital. He denied any fevers/chills,nausea/vomiting,palpiotations,sweating,abdominal pain,diarrhea,melena. PT was recently hypotensive during previous admission and was started on hydrocortisone. PT was trnasitioned from hydrocoritosne to prednisone 20mg     In the ED VS T:92.7 BP:98/50 P:80 RR:20 O2:100% on 2L. PT was given vanc 1g, 1LNS bolus, aztroenam 1g and hydrocortisone 100mg IV

## 2018-04-21 NOTE — PATIENT PROFILE ADULT. - FALL HARM RISK
coagulation(Bleeding disorder R/T clinical cond/anti-coags)/post fall/bones(Osteoporosis,prev fx,steroid use,metastatic bone ca/other

## 2018-04-21 NOTE — ED PROVIDER NOTE - MEDICAL DECISION MAKING DETAILS
82 Y M with recent cardiac stent with chest pressure, hypothermia, hypotension, hypoglycemia. DDx: ACS, Sepsis, Adrenal insufficiency will get labs, stress dose steroids, infection screening, abx, admission possibly unit.

## 2018-04-21 NOTE — H&P ADULT - NSHPLABSRESULTS_GEN_ALL_CORE
CBC: Normocytic anemia   BMP:Hypoalbuminemia and elevated alk phos   Mild elevated troponin  High specific gravity

## 2018-04-21 NOTE — CONSULT NOTE ADULT - ASSESSMENT
pt with hypotension s/p recent stent /ptca  stat echo  called  agree to r/o sepsis but pt does not look toxic  continue asa/plavix without delay pt with hypotension s/p recent stent /ptca  stat echo  called  agree to r/o sepsis but pt does not look toxic  continue asa/plavix without delay  endocrine eval  decrease fluid

## 2018-04-21 NOTE — CONSULT NOTE ADULT - ASSESSMENT
pt  with  h/o htn, cad, stents, br  asthma, seizure, cva    s./p  recent cath,  now  with rico, to  LAD and angioplasty, D1, ON 4/17  normal; ef   now with anemia  ,   on c/c . prednisone,, for  RA,    now  with low  bp,   needs  stress dose  hydrocortisone, tid  c.c anemia  Pp  is dnr now pt  with  h/o htn, cad, stents, br  asthma, seizure, cva , ca esophagus,  c/c  cachexia   s./p  recent cath,  now  with rico, to  LAD and angioplasty, D1, ON 4/17  normal; ef   now with anemia  ,   on c/c . prednisone,, for  RA,    now  with low  bp,   needs  stress dose  hydrocortisone, tid  c.c anemia  Pp  is dnr now  care per  icu pt  with  h/o htn, cad, stents, br  asthma, seizure, cva , ca esophagus,  c/c  cachexia, RA  on prednisone   s./p  recent cath,   with rico, to  LAD and angioplasty, D1, ON 4/17,  an d since then, had  low  bp,, and  was  on midodrine, on last admission  h/o  anemia ,   on c/c . prednisone,, for  RA,    now  with low  bp,   needs  stress dose  hydrocortisone, tid  warming blanket  does  not  appear to have  sepssis  c.c anemia  Pt  is dnr now,  wife at bedside  echo to be done  card fartun, per  dr benita byrd  care per  icu pt  with  h/o htn, cad, stents, br  asthma, seizure, cva , ca esophagus,  c/c  cachexia, RA  on prednisone   s./p  recent cath,   with rico, to  LAD and angioplasty, D1, ON 4/17,  an d since then, had  low  bp,, and  was  on midodrine, on last admission  h/o  anemia ,   on c/c . prednisone,, for  RA,    now  with low  bp,   needs  stress dose  hydrocortisone, tid  warming blanket  does  not  appear to have  sepsis,   follow  blood/ urine  c/c  c.c anemia  Pt  is dnr now,  wife at bedside  echo to be done today  needs  asa/ plavix  to be continued, given recent pci  card eval, per  dr benita byrd  care per  icu

## 2018-04-21 NOTE — H&P ADULT - PMH
Anxiety    Asthma    BCC (basal cell carcinoma)  skin  BPH (benign prostatic hyperplasia)    Cerebrovascular accident (CVA)    Chronic allergic rhinitis    Coronary artery disease  s/p 3 stents on June 30, 2017 at [place] Dr. Lavelle Reid  Esophagus cancer    Former smoker, stopped smoking in distant past    HTN (hypertension)    Hyperlipidemia    OA (osteoarthritis) of knee  right  Rheumatoid arthritis    Seizure disorder  1 episode approximately 15 yrs ago

## 2018-04-22 LAB
ALBUMIN SERPL ELPH-MCNC: 2.4 G/DL — LOW (ref 3.3–5)
ALP SERPL-CCNC: 238 U/L — HIGH (ref 40–120)
ALT FLD-CCNC: 37 U/L — SIGNIFICANT CHANGE UP (ref 10–45)
ANION GAP SERPL CALC-SCNC: 8 MMOL/L — SIGNIFICANT CHANGE UP (ref 5–17)
AST SERPL-CCNC: 32 U/L — SIGNIFICANT CHANGE UP (ref 10–40)
BASOPHILS # BLD AUTO: 0 K/UL — SIGNIFICANT CHANGE UP (ref 0–0.2)
BASOPHILS NFR BLD AUTO: 0 % — SIGNIFICANT CHANGE UP (ref 0–2)
BILIRUB SERPL-MCNC: 0.2 MG/DL — SIGNIFICANT CHANGE UP (ref 0.2–1.2)
BUN SERPL-MCNC: 21 MG/DL — SIGNIFICANT CHANGE UP (ref 7–23)
CALCIUM SERPL-MCNC: 8.5 MG/DL — SIGNIFICANT CHANGE UP (ref 8.4–10.5)
CHLORIDE SERPL-SCNC: 103 MMOL/L — SIGNIFICANT CHANGE UP (ref 96–108)
CK MB CFR SERPL CALC: 10.5 NG/ML — HIGH (ref 0–6.7)
CK SERPL-CCNC: 69 U/L — SIGNIFICANT CHANGE UP (ref 30–200)
CO2 SERPL-SCNC: 23 MMOL/L — SIGNIFICANT CHANGE UP (ref 22–31)
CREAT SERPL-MCNC: 0.73 MG/DL — SIGNIFICANT CHANGE UP (ref 0.5–1.3)
EOSINOPHIL # BLD AUTO: 0 K/UL — SIGNIFICANT CHANGE UP (ref 0–0.5)
EOSINOPHIL NFR BLD AUTO: 0.3 % — SIGNIFICANT CHANGE UP (ref 0–6)
GGT SERPL-CCNC: 218 U/L — HIGH (ref 9–50)
GLUCOSE BLDC GLUCOMTR-MCNC: 139 MG/DL — HIGH (ref 70–99)
GLUCOSE BLDC GLUCOMTR-MCNC: 73 MG/DL — SIGNIFICANT CHANGE UP (ref 70–99)
GLUCOSE SERPL-MCNC: 139 MG/DL — HIGH (ref 70–99)
HCT VFR BLD CALC: 29.1 % — LOW (ref 39–50)
HGB BLD-MCNC: 9.6 G/DL — LOW (ref 13–17)
LYMPHOCYTES # BLD AUTO: 0.7 K/UL — LOW (ref 1–3.3)
LYMPHOCYTES # BLD AUTO: 13.2 % — SIGNIFICANT CHANGE UP (ref 13–44)
MAGNESIUM SERPL-MCNC: 1.8 MG/DL — SIGNIFICANT CHANGE UP (ref 1.6–2.6)
MCHC RBC-ENTMCNC: 31.5 PG — SIGNIFICANT CHANGE UP (ref 27–34)
MCHC RBC-ENTMCNC: 32.9 GM/DL — SIGNIFICANT CHANGE UP (ref 32–36)
MCV RBC AUTO: 95.8 FL — SIGNIFICANT CHANGE UP (ref 80–100)
MONOCYTES # BLD AUTO: 0.1 K/UL — SIGNIFICANT CHANGE UP (ref 0–0.9)
MONOCYTES NFR BLD AUTO: 2.4 % — SIGNIFICANT CHANGE UP (ref 2–14)
NEUTROPHILS # BLD AUTO: 4.7 K/UL — SIGNIFICANT CHANGE UP (ref 1.8–7.4)
NEUTROPHILS NFR BLD AUTO: 84.2 % — HIGH (ref 43–77)
PHOSPHATE SERPL-MCNC: 2.9 MG/DL — SIGNIFICANT CHANGE UP (ref 2.5–4.5)
PLATELET # BLD AUTO: 212 K/UL — SIGNIFICANT CHANGE UP (ref 150–400)
POTASSIUM SERPL-MCNC: 4.2 MMOL/L — SIGNIFICANT CHANGE UP (ref 3.5–5.3)
POTASSIUM SERPL-SCNC: 4.2 MMOL/L — SIGNIFICANT CHANGE UP (ref 3.5–5.3)
PROT SERPL-MCNC: 6.2 G/DL — SIGNIFICANT CHANGE UP (ref 6–8.3)
RBC # BLD: 3.04 M/UL — LOW (ref 4.2–5.8)
RBC # FLD: 15.8 % — HIGH (ref 10.3–14.5)
SODIUM SERPL-SCNC: 134 MMOL/L — LOW (ref 135–145)
T3FREE SERPL-MCNC: 1.01 PG/ML — LOW (ref 1.8–4.6)
T4 AB SER-ACNC: 3.5 UG/DL — LOW (ref 4.6–12)
TROPONIN T SERPL-MCNC: 0.15 NG/ML — HIGH (ref 0–0.06)
VANCOMYCIN TROUGH SERPL-MCNC: 16.2 UG/ML — SIGNIFICANT CHANGE UP (ref 10–20)
WBC # BLD: 5.5 K/UL — SIGNIFICANT CHANGE UP (ref 3.8–10.5)
WBC # FLD AUTO: 5.5 K/UL — SIGNIFICANT CHANGE UP (ref 3.8–10.5)

## 2018-04-22 PROCEDURE — 93970 EXTREMITY STUDY: CPT | Mod: 26

## 2018-04-22 PROCEDURE — 99291 CRITICAL CARE FIRST HOUR: CPT

## 2018-04-22 RX ADMIN — Medication 50 MILLIGRAM(S): at 06:06

## 2018-04-22 RX ADMIN — MONTELUKAST 10 MILLIGRAM(S): 4 TABLET, CHEWABLE ORAL at 11:00

## 2018-04-22 RX ADMIN — Medication 100 MILLIGRAM(S): at 05:12

## 2018-04-22 RX ADMIN — Medication 50 MILLIGRAM(S): at 14:46

## 2018-04-22 RX ADMIN — MIDODRINE HYDROCHLORIDE 5 MILLIGRAM(S): 2.5 TABLET ORAL at 06:06

## 2018-04-22 RX ADMIN — TIOTROPIUM BROMIDE 1 CAPSULE(S): 18 CAPSULE ORAL; RESPIRATORY (INHALATION) at 11:50

## 2018-04-22 RX ADMIN — AZITHROMYCIN 250 MILLIGRAM(S): 500 TABLET, FILM COATED ORAL at 12:09

## 2018-04-22 RX ADMIN — FINASTERIDE 5 MILLIGRAM(S): 5 TABLET, FILM COATED ORAL at 11:00

## 2018-04-22 RX ADMIN — Medication 250 MILLIGRAM(S): at 18:20

## 2018-04-22 RX ADMIN — BUDESONIDE AND FORMOTEROL FUMARATE DIHYDRATE 2 PUFF(S): 160; 4.5 AEROSOL RESPIRATORY (INHALATION) at 17:56

## 2018-04-22 RX ADMIN — MIDODRINE HYDROCHLORIDE 5 MILLIGRAM(S): 2.5 TABLET ORAL at 14:46

## 2018-04-22 RX ADMIN — CLOPIDOGREL BISULFATE 75 MILLIGRAM(S): 75 TABLET, FILM COATED ORAL at 11:04

## 2018-04-22 RX ADMIN — Medication 100 MILLIGRAM(S): at 21:42

## 2018-04-22 RX ADMIN — HEPARIN SODIUM 5000 UNIT(S): 5000 INJECTION INTRAVENOUS; SUBCUTANEOUS at 21:42

## 2018-04-22 RX ADMIN — Medication 1 MILLIGRAM(S): at 11:13

## 2018-04-22 RX ADMIN — MIDODRINE HYDROCHLORIDE 5 MILLIGRAM(S): 2.5 TABLET ORAL at 21:42

## 2018-04-22 RX ADMIN — Medication 100 MILLIGRAM(S): at 11:00

## 2018-04-22 RX ADMIN — PREGABALIN 500 MICROGRAM(S): 225 CAPSULE ORAL at 11:00

## 2018-04-22 RX ADMIN — Medication 200 MILLIGRAM(S): at 18:20

## 2018-04-22 RX ADMIN — Medication 81 MILLIGRAM(S): at 11:04

## 2018-04-22 RX ADMIN — BUDESONIDE AND FORMOTEROL FUMARATE DIHYDRATE 2 PUFF(S): 160; 4.5 AEROSOL RESPIRATORY (INHALATION) at 05:45

## 2018-04-22 RX ADMIN — Medication 250 MILLIGRAM(S): at 05:03

## 2018-04-22 RX ADMIN — Medication 200 MILLIGRAM(S): at 06:06

## 2018-04-22 RX ADMIN — Medication 100 MILLIGRAM(S): at 14:46

## 2018-04-22 RX ADMIN — Medication 0.5 MILLIGRAM(S): at 06:06

## 2018-04-22 RX ADMIN — HEPARIN SODIUM 5000 UNIT(S): 5000 INJECTION INTRAVENOUS; SUBCUTANEOUS at 05:12

## 2018-04-22 RX ADMIN — Medication 0.5 MILLIGRAM(S): at 18:20

## 2018-04-22 RX ADMIN — HEPARIN SODIUM 5000 UNIT(S): 5000 INJECTION INTRAVENOUS; SUBCUTANEOUS at 14:46

## 2018-04-22 RX ADMIN — Medication 50 MILLIGRAM(S): at 21:42

## 2018-04-22 NOTE — PROGRESS NOTE ADULT - SUBJECTIVE AND OBJECTIVE BOX
resting, very talkative  daughter at bedside    REVIEW OF SYSTEMS:  CONSTITUTIONAL: No weakness,  no   fevers      RESPIRATORY:  No    shortness of breath  CARDIOVASCULAR: No chest pain,   GASTROINTESTINAL: No abdominal  pain  NEUROLOGICAL: No  focal  weakness    MEDICATIONS  (STANDING):  ALPRAZolam 0.5 milliGRAM(s) Oral two times a day  aspirin enteric coated 81 milliGRAM(s) Oral daily  azithromycin  IVPB 500 milliGRAM(s) IV Intermittent every 24 hours  aztreonam  IVPB 1000 milliGRAM(s) IV Intermittent every 8 hours  buDESOnide  80 MICROgram(s)/formoterol 4.5 MICROgram(s) Inhaler 2 Puff(s) Inhalation two times a day  clopidogrel Tablet 75 milliGRAM(s) Oral daily  cyanocobalamin 500 MICROGram(s) Oral daily  finasteride 5 milliGRAM(s) Oral daily  folic acid 1 milliGRAM(s) Oral daily  heparin  Injectable 5000 Unit(s) SubCutaneous every 8 hours  hydrocortisone sodium succinate Injectable 100 milliGRAM(s) IV Push every 8 hours  midodrine 5 milliGRAM(s) Oral every 8 hours  montelukast 10 milliGRAM(s) Oral daily  norepinephrine Infusion 0.05 MICROgram(s)/kG/Min (5.25 mL/Hr) IV Continuous <Continuous>  phenytoin   Capsule 200 milliGRAM(s) Oral two times a day  pyridoxine 100 milliGRAM(s) Oral daily  tiotropium 18 MICROgram(s) Capsule 1 Capsule(s) Inhalation daily  vancomycin  IVPB 1000 milliGRAM(s) IV Intermittent every 12 hours    MEDICATIONS  (PRN):  acetaminophen   Tablet 650 milliGRAM(s) Oral every 6 hours PRN For Temp greater than 38 C (100.4 F)  ALBUTerol/ipratropium for Nebulization 3 milliLiter(s) Nebulizer every 6 hours PRN Shortness of Breath and/or Wheezing      Vital Signs Last 24 Hrs  T(C): 36.3 (2018 07:30), Max: 37.3 (2018 04:00)  T(F): 97.4 (2018 07:30), Max: 99.1 (2018 04:00)  HR: 70 (2018 08:45) (66 - 86)  BP: 111/62 (2018 08:45) (72/40 - 160/65)  BP(mean): 79 (2018 08:45) (52 - 95)  RR: 27 (2018 08:45) (11 - 39)  SpO2: 100% (2018 08:45) (80% - 100%)  CAPILLARY BLOOD GLUCOSE      POCT Blood Glucose.: 144 mg/dL (2018 17:09)  POCT Blood Glucose.: 97 mg/dL (2018 10:58)    I&O's Summary    2018 07:  -  2018 07:00  --------------------------------------------------------  IN: 1674.3 mL / OUT: 600 mL / NET: 1074.3 mL    2018 07:01  -  2018 09:36  --------------------------------------------------------  IN: 0 mL / OUT: 200 mL / NET: -200 mL        PHYSICAL EXAM:  HEAD:  Atraumatic, Normocephalic  NECK: Supple, No JVD  CHEST/LUNG:   no     rales,     no,    rhonchi  HEART: Regular rate and rhythm;         murmur  ABDOMEN: Soft, Nontender, ;   EXTREMITIES:  less      edema  NEUROLOGY:  alert    LABS:                        9.6    5.5   )-----------( 212      ( 2018 02:29 )             29.1     -    134<L>  |  103  |  21  ----------------------------<  139<H>  4.2   |  23  |  0.73    Ca    8.5      2018 02:29  Phos  2.9     -  Mg     1.8         TPro  6.2  /  Alb  2.4<L>  /  TBili  0.2  /  DBili  x   /  AST  32  /  ALT  37  /  AlkPhos  238<H>  22    PT/INR - ( 2018 03:26 )   PT: 10.5 sec;   INR: 0.96 ratio         PTT - ( 2018 03:26 )  PTT:30.6 sec  CARDIAC MARKERS ( 2018 02:29 )  x     / 0.15 ng/mL / 69 U/L / x     / 10.5 ng/mL  CARDIAC MARKERS ( 2018 20:06 )  x     / 0.18 ng/mL / 80 U/L / x     / 11.5 ng/mL  CARDIAC MARKERS ( 2018 12:09 )  x     / 0.16 ng/mL / 92 U/L / x     / 13.2 ng/mL  CARDIAC MARKERS ( 2018 03:26 )  x     / 0.17 ng/mL / x     / x     / x          Urinalysis Basic - ( 2018 05:15 )    Color: Yellow / Appearance: Clear / S.029 / pH: x  Gluc: x / Ketone: Negative  / Bili: Negative / Urobili: 1 mg/dL   Blood: x / Protein: 30 mg/dL / Nitrite: Negative   Leuk Esterase: Negative / RBC: 0-2 /HPF / WBC 0-2 /HPF   Sq Epi: x / Non Sq Epi: x / Bacteria: Few /HPF           @ 03:26  3.6  46      Thyroid Stimulating Hormone, Serum: 10.24 uIU/mL ( @ 07:04)          Consultant(s) Notes Reviewed:      Care Discussed with Consultants/Other Providers:

## 2018-04-22 NOTE — PROGRESS NOTE ADULT - ASSESSMENT
pt  with  h/o htn, cad, stents, br  asthma, seizure, cva , ca esophagus,  c/c  cachexia, RA  on prednisone   s./p  recent cath,   with rico, to  LAD and angioplasty, D1, ON 4/17,  an d since then, had  low  bp,, and  was  on midodrine, on last admission  h/o  anemia ,   on c/c . prednisone,, for  RA,    low  bp,  stress dose  hydrocortisone,   does  not  appear to have  sepsis,   follow  blood/ urine  c/c  c.c anemia  Pt  is dnr now,    echo  with modertae AS. / ?  severe   asa/ plavix   given recent pci  care per  icu

## 2018-04-22 NOTE — PROGRESS NOTE ADULT - SUBJECTIVE AND OBJECTIVE BOX
CHIEF COMPLAINT: chest pressure    Interval Events: Overnight pt remained stable. Pressors off since 6 am this morning and bp stable. No complaints this morning. CP has been resolved.     REVIEW OF SYSTEMS:  Constitutional: [x ] negative [ ] fevers [ ] chills [ ] weight loss [ ] weight gain  HEENT: [x ] negative [ ] dry eyes [ ] eye irritation [ ] postnasal drip [ ] nasal congestion  CV: [x ] negative  [ ] chest pain [ ] orthopnea [ ] palpitations [ ] murmur  Resp: [x ] negative [ ] cough [ ] shortness of breath [ ] dyspnea [ ] wheezing [ ] sputum [ ] hemoptysis  GI: [ x] negative [ ] nausea [ ] vomiting [ ] diarrhea [ ] constipation [ ] abd pain [ ] dysphagia   : [ x] negative [ ] dysuria [ ] nocturia [ ] hematuria [ ] increased urinary frequency  Musculoskeletal: [x ] negative [ ] back pain [ ] myalgias [ ] arthralgias [ ] fracture  Skin: [x ] negative [ ] rash [ ] itch  Neurological: [x ] negative [ ] headache [ ] dizziness [ ] syncope [ ] weakness [ ] numbness  Psychiatric: [x ] negative [ ] anxiety [ ] depression  Endocrine: [ x] negative [ ] diabetes [ ] thyroid problem  Hematologic/Lymphatic: [ x] negative [ ] anemia [ ] bleeding problem  Allergic/Immunologic: [x ] negative [ ] itchy eyes [ ] nasal discharge [ ] hives [ ] angioedema  [x ] All other systems negative  [ ] Unable to assess ROS because ________    OBJECTIVE:  ICU Vital Signs Last 24 Hrs  T(C): 36.3 (2018 07:30), Max: 37.3 (2018 04:00)  T(F): 97.4 (2018 07:30), Max: 99.1 (2018 04:00)  HR: 70 (2018 08:45) (66 - 86)  BP: 111/62 (2018 08:45) (72/40 - 160/65)  BP(mean): 79 (2018 08:45) (52 - 95)  ABP: --  ABP(mean): --  RR: 27 (2018 08:45) (11 - 39)  SpO2: 100% (2018 08:45) (80% - 100%)         @ 07: @ 07:00  --------------------------------------------------------  IN: 1674.3 mL / OUT: 600 mL / NET: 1074.3 mL     @ 07: @ 08:49  --------------------------------------------------------  IN: 0 mL / OUT: 200 mL / NET: -200 mL      CAPILLARY BLOOD GLUCOSE      POCT Blood Glucose.: 144 mg/dL (2018 17:09)    GENERAL: NAD  HEENT:  Atraumatic, Normocephalic  EYES: EOMI, PERRLA, conjunctiva and sclera clear  NECK: Supple, No JVD  CHEST/LUNG: Clear to auscultation bilaterally; No wheezes, rales, or rhonchi  HEART: Regular rate and rhythm; systolic murmur  ABDOMEN: Soft, Nontender, Nondistended; Bowel sounds present  EXTREMITIES:  2+ Peripheral Pulses, No clubbing, cyanosis, or edema  PSYCH: AAOx3  NEUROLOGY: non-focal exam  SKIN: No rashes or lesions    LINES: [facility] MEDICATIONS:  aspirin enteric coated 81 milliGRAM(s) Oral daily  clopidogrel Tablet 75 milliGRAM(s) Oral daily  heparin  Injectable 5000 Unit(s) SubCutaneous every 8 hours    azithromycin  IVPB 500 milliGRAM(s) IV Intermittent every 24 hours  aztreonam  IVPB 1000 milliGRAM(s) IV Intermittent every 8 hours  vancomycin  IVPB 1000 milliGRAM(s) IV Intermittent every 12 hours    midodrine 5 milliGRAM(s) Oral every 8 hours  norepinephrine Infusion 0.05 MICROgram(s)/kG/Min IV Continuous <Continuous>    finasteride 5 milliGRAM(s) Oral daily  hydrocortisone sodium succinate Injectable 100 milliGRAM(s) IV Push every 8 hours    ALBUTerol/ipratropium for Nebulization 3 milliLiter(s) Nebulizer every 6 hours PRN  buDESOnide  80 MICROgram(s)/formoterol 4.5 MICROgram(s) Inhaler 2 Puff(s) Inhalation two times a day  montelukast 10 milliGRAM(s) Oral daily  tiotropium 18 MICROgram(s) Capsule 1 Capsule(s) Inhalation daily    acetaminophen   Tablet 650 milliGRAM(s) Oral every 6 hours PRN  ALPRAZolam 0.5 milliGRAM(s) Oral two times a day  phenytoin   Capsule 200 milliGRAM(s) Oral two times a day          cyanocobalamin 500 MICROGram(s) Oral daily  folic acid 1 milliGRAM(s) Oral daily  pyridoxine 100 milliGRAM(s) Oral daily            LABS:                        9.6    5.5   )-----------( 212      ( 2018 02:29 )             29.1     Hgb Trend: 9.6<--, 8.6<--, 9.2<--, 8.4<--, 7.8<--  04-22    134<L>  |  103  |  21  ----------------------------<  139<H>  4.2   |  23  |  0.73    Ca    8.5      2018 02:29  Phos  2.9     -22  Mg     1.8     22    TPro  6.2  /  Alb  2.4<L>  /  TBili  0.2  /  DBili  x   /  AST  32  /  ALT  37  /  AlkPhos  238<H>  22    Creatinine Trend: 0.73<--, 0.70<--, 0.76<--, 0.71<--, 0.75<--, 0.61<--  PT/INR - ( 2018 03:26 )   PT: 10.5 sec;   INR: 0.96 ratio         PTT - ( 2018 03:26 )  PTT:30.6 sec  Urinalysis Basic - ( 2018 05:15 )    Color: Yellow / Appearance: Clear / S.029 / pH: x  Gluc: x / Ketone: Negative  / Bili: Negative / Urobili: 1 mg/dL   Blood: x / Protein: 30 mg/dL / Nitrite: Negative   Leuk Esterase: Negative / RBC: 0-2 /HPF / WBC 0-2 /HPF   Sq Epi: x / Non Sq Epi: x / Bacteria: Few /HPF        Venous Blood Gas:   @ 03:26  7.24/62/46/  VBG Lactate: 2.5      MICROBIOLOGY:     RADIOLOGY:  [ ] Reviewed and interpreted by me    EKG:

## 2018-04-22 NOTE — PROGRESS NOTE ADULT - ATTENDING COMMENTS
Pt seen and examined. 83 M former smoker w/ h/o HTN, TIA, Seizures, RA, CAD s/p 6 stents most recently 4/17/18, Esophageal CA s/p chemo/RT and esophagogastrectomy, with hospitalization complicated by hypotension requiring stress dose steroids which were transitioned to PO prednisone, discharged on home on 4/20 now presenting with chest pressure, mild hypoxia and shock state of unclear etiology.  1. Hypotension/Shock state - Possible septic shock vs adrenal insufficiency due to rapid steroid taper. Followup cultures  - Cortisol was not sent as patient had already been given Hydrocortisone in ED  - followup thyroid studies as patient presented hypotensive and hypothermic  - Empiric broad spectrum coverage with Vanc/ Zosyn/ Azithro for now  - Check urine legionella antigen  - Vasopressor support titrated off this AM  - Hydrocortisone 100 mg Q8H IV as he was on a prednisone taper at home  2. Cardiac Disease - with recent cath and stent placement   - Repeat EKG and follow serial cardiac enzymes  - Bedside point of care LE CUS study inconclusive. Will check b/l LE dopplers  - If any worsening of hypotension/ hypoxia will require a CTPA to r/o PE  3. Esophageal cancer s/p recent surgery  - Swallow evaluation previously done - patient on modified diet  4. RA on chronic steroids - will need a slow taper of corticosteroids this time  - Guarded prognosis  - Critical Care Time Spent: 35 Min.

## 2018-04-22 NOTE — PROGRESS NOTE ADULT - SUBJECTIVE AND OBJECTIVE BOX
- Patient seen and examined.  - In summary, patient is a 82 year old man who presented with weakness and fatigue. (2018 11:11)         *****MEDICATIONS:    MEDICATIONS  (STANDING):  ALPRAZolam 0.5 milliGRAM(s) Oral two times a day  aspirin enteric coated 81 milliGRAM(s) Oral daily  azithromycin  IVPB 500 milliGRAM(s) IV Intermittent every 24 hours  aztreonam  IVPB 1000 milliGRAM(s) IV Intermittent every 8 hours  buDESOnide  80 MICROgram(s)/formoterol 4.5 MICROgram(s) Inhaler 2 Puff(s) Inhalation two times a day  clopidogrel Tablet 75 milliGRAM(s) Oral daily  cyanocobalamin 500 MICROGram(s) Oral daily  finasteride 5 milliGRAM(s) Oral daily  folic acid 1 milliGRAM(s) Oral daily  heparin  Injectable 5000 Unit(s) SubCutaneous every 8 hours  hydrocortisone sodium succinate Injectable 100 milliGRAM(s) IV Push every 8 hours  midodrine 5 milliGRAM(s) Oral every 8 hours  montelukast 10 milliGRAM(s) Oral daily  norepinephrine Infusion 0.05 MICROgram(s)/kG/Min (5.25 mL/Hr) IV Continuous <Continuous>  phenytoin   Capsule 200 milliGRAM(s) Oral two times a day  pyridoxine 100 milliGRAM(s) Oral daily  tiotropium 18 MICROgram(s) Capsule 1 Capsule(s) Inhalation daily  vancomycin  IVPB 1000 milliGRAM(s) IV Intermittent every 12 hours    MEDICATIONS  (PRN):  acetaminophen   Tablet 650 milliGRAM(s) Oral every 6 hours PRN For Temp greater than 38 C (100.4 F)  ALBUTerol/ipratropium for Nebulization 3 milliLiter(s) Nebulizer every 6 hours PRN Shortness of Breath and/or Wheezing           ***** REVIEW OF SYSTEM:  GEN: no fever, no chills, no pain  RESP: no SOB, no cough, no sputum  CVS: no chest pain, no palpitations, no edema  GI: no abdominal pain, no nausea, no vomiting, no constipation, no diarrhea  : no dysuria, no frequency  NEURO: no headache, no dizziness  PSYCH: no depression, not anxious  Derm : no itching, no rash         ***** VITAL SIGNS:  T(F): 97.4 (18 @ 07:30), Max: 99.1 (18 @ 04:00)  HR: 70 (18 @ 08:45) (66 - 86)  BP: 111/62 (18 @ 08:45) (72/40 - 160/65)  RR: 27 (18 @ 08:45) (11 - 39)  SpO2: 100% (18 @ 08:45) (80% - 100%)  Wt(kg): --  ,   I&O's Summary    2018 07:  -  2018 07:00  --------------------------------------------------------  IN: 1674.3 mL / OUT: 600 mL / NET: 1074.3 mL    2018 07:  -  2018 09:31  --------------------------------------------------------  IN: 0 mL / OUT: 200 mL / NET: -200 mL             *****PHYSICAL EXAM:  GEN: A&O X 3 , NAD , comfortable  HEENT: NCAT, EOMI, MMM, no icterus  NECK: Supple, No JVD  CVS: S1S2 , regular , No M/R/G appreciated  PULM: CTA B/L,  no W/R/R appreciated  ABD.: soft. non tender, non distended,  bowel sounds present  Extrem: intact pulses , no edema noted  Derm: No rash or ecchymosis noted  PSYCH: normal mood, no depression, not anxious         *****LAB AND IMAGIN.6    5.5   )-----------( 212      ( 2018 02:29 )             29.1                   134<L>  |  103  |  21  ----------------------------<  139<H>  4.2   |  23  |  0.73    Ca    8.5      2018 02:29  Phos  2.9     04-  Mg     1.8     -22    TPro  6.2  /  Alb  2.4<L>  /  TBili  0.2  /  DBili  x   /  AST  32  /  ALT  37  /  AlkPhos  238<H>  -22    PT/INR - ( 2018 03:26 )   PT: 10.5 sec;   INR: 0.96 ratio         PTT - ( 2018 03:26 )  PTT:30.6 sec       CARDIAC MARKERS ( 2018 02:29 )  x     / 0.15 ng/mL / 69 U/L / x     / 10.5 ng/mL  CARDIAC MARKERS ( 2018 20:06 )  x     / 0.18 ng/mL / 80 U/L / x     / 11.5 ng/mL  CARDIAC MARKERS ( 2018 12:09 )  x     / 0.16 ng/mL / 92 U/L / x     / 13.2 ng/mL  CARDIAC MARKERS ( 2018 03:26 )  x     / 0.17 ng/mL / x     / x     / x                  Urinalysis Basic - ( 2018 05:15 )    Color: Yellow / Appearance: Clear / S.029 / pH: x  Gluc: x / Ketone: Negative  / Bili: Negative / Urobili: 1 mg/dL   Blood: x / Protein: 30 mg/dL / Nitrite: Negative   Leuk Esterase: Negative / RBC: 0-2 /HPF / WBC 0-2 /HPF   Sq Epi: x / Non Sq Epi: x / Bacteria: Few /HPF      [All pertinent recent Imaging/Reports reviewed]         *****A S S E S S M E N T   A N D   P L A N :  82M with HTN, Asthma, CVA, seizures, CAD who presents with mild chest discomfort, fatigue, diaphoresis, hypothermia, hypoglycemia, hypotension  abx for PNA  keep O=I  DAPT s/p recent PCi  pressors prn  BB as allowable  echo  consider endo eval  care per micu      __________________________  POLLO Brady D.O.

## 2018-04-22 NOTE — CHART NOTE - NSCHARTNOTEFT_GEN_A_CORE
MICU Transfer Note    Transfer from: MICU    Transfer to: ( x) Medicine    (  ) Telemetry     (   ) RCU        (    ) Palliative         (   ) Stroke Unit          (   ) __________________    Accepting Physician: Myriam Covington  Signout given to:     MICU COURSE:  82 y/o M former smoker w/ hx of HTN, TIA, Seizures, RA, CAD s/p 6 stents most recently 4/17/18, Esophageal CA s/p chemo/RT and esophagogastrectomy recent admission for cardiac cath now presenting for altered mental status at home. As per patient and family he was discharged 1 day prior to admission and was doing well until later in the day when his wife noticed he became more lethargic and had difficulty answering questions. He also noticed that he was having substernal left sided chest pressure radiating down his left arm which lasted for a few minutes. HE was concerned given the recent stent that this was another cardiac event and decided to come into the hospital.  PT was recently hypotensive during previous admission and was started on hydrocortisone. PT was transitioned from hydrocortisone to prednisone 20mg     In ED pt found to be persistently hypotensive, was given hydrocortisone 100 mg, but continued to have low BP and started on pressors.    Upon arriving to MICU pt was no longer complaining of chest pain and states he felt fine. Pt was continued on stress dose steroids with hydrocortisone 100 Q8. Pt was started on vancomycin/zosyn/azithro for possible sepsis. Bedside POCUS did not reveal any gross heart failure or reduced EF. There was concern that possible PE may have caused hypotension as pt was also hypoxic requiring supplemental oxygen. Pt was taken off oxygen overnight with good saturations and duplex studies of legs were negative. Pt was able to be titrated off levophed overnight. Has been off levophed since 6AM this morning and BP has remained stable. Pt currently stable to transfer to floors.             ASSESSMENT & PLAN: 82 y/o M former smoker w/ hx of HTN, TIA, Seizures, RA, CAD s/p 6 stents most recently 4/17/18, Esophageal CA s/p chemo/RT and esophagogastrectomy recent admission for cardiac cath now presenting with hypotension likely 2/2 adrenal insufficiency.     #Hypotension- bp now stable off pressors. If remains stable would SLOWLY titrate patient down on steroids. Currently does not appear to be sepsis, if cultures remain negative can likely d/c antibiotics.     #CAD- c/w asa, plavix and statin    #dysphagia- pt with coughing during eating, states has been happening ever since esophagectomy. Placed on soft diet and placed swallow evaluation.    #seizure- c/w keppra    #COPD- c/w spiriva, duonebs prn    #Endocrine- TSH mildly elevated with borderline low t3/t4, at this point will hold off on synthroid, if becomes hypotensive/bradycardic/hypothermic would start synthroid 50 mcg. Get repeat thyroid function tests in 6 weeks.     FOR FOLLOW UP:  -d/c abx if infectious work up neg  -slowly titrate down steroids if bp normal  -follow up speech eval

## 2018-04-22 NOTE — PROGRESS NOTE ADULT - ASSESSMENT
84 y/o M former smoker w/ hx of HTN, TIA, Seizures, RA, CAD s/p 6 stents most recently 4/17/18, Esophageal CA s/p chemo/RT and esophagogastrectomy recent admission for cardiac cath now presenting for altered mental status at home. Found ot be hypotensive in ED requiring pressors now off pressors.     Neuro: Pt currently AOX3. No focal deficits. Known seizure d/o  -Neuro checks q4  -c/w Dilantin 200 BID    CV: PT currently in shock state. Etiology possibly related to rapid taper of steroids given chronic steroids vs hypovolemia from dehydration vs sepsis from infection vs obstructive shock from PE  -pt now titrated off pressors, bp stable. Will continue to monitor, if stable throughout the day will likely bed board in the evening  -c/w Hydrocortisone 100 q8 IV push daily, will consider slow taper starting tomorrow if bp remains stable.   -PT volume resuscitated    -Will treat possible infection   -c/w ASA and Plavix   -Trend troponins   -Hold anti hypertensives   -Duplex scan to r/o DVT.  if persistently hypoxic and hypotensive. Will send for CTA    Pulm: pt no longer requiring oxygen.   -c/w symbicort BID   -c/w spiriva   -Duonebs PRN     Renal:No issues  -Monitor I/Os    ID: No clear signs of infection. CXR shows stable r pleural effusion. blood cx NGTD. RVP neg   -c/w Vanc,Aztrenoam, and Azithromycin  -Check urine legionella    GI: pt having some coughing after eating. Reportedly has happening for months since esophagectomy.   -started on soft diet and speech and swallow assessement ordered.     Heme:Normocytic anemia likely chronic disease  -Monitor CBC    Endo:Concern for adrenal insufficiency   -Treat with Hydrocortisone 100q8 IV  -consider titrating down tomorrow if bp stable.   -TSH 10 and T4/t3 low, will start synthroid 50 mcg qd    Psych: Known anxiety  -c/w Xanax .5 BID     DVT PPX:HSQ    Dispo:full code pending further w/u

## 2018-04-23 ENCOUNTER — TRANSCRIPTION ENCOUNTER (OUTPATIENT)
Age: 83
End: 2018-04-23

## 2018-04-23 ENCOUNTER — APPOINTMENT (OUTPATIENT)
Dept: PULMONOLOGY | Facility: CLINIC | Age: 83
End: 2018-04-23

## 2018-04-23 DIAGNOSIS — E16.2 HYPOGLYCEMIA, UNSPECIFIED: ICD-10-CM

## 2018-04-23 DIAGNOSIS — R04.0 EPISTAXIS: ICD-10-CM

## 2018-04-23 DIAGNOSIS — E27.3 DRUG-INDUCED ADRENOCORTICAL INSUFFICIENCY: ICD-10-CM

## 2018-04-23 LAB
ANION GAP SERPL CALC-SCNC: 7 MMOL/L — SIGNIFICANT CHANGE UP (ref 5–17)
BUN SERPL-MCNC: 22 MG/DL — SIGNIFICANT CHANGE UP (ref 7–23)
CALCIUM SERPL-MCNC: 9.3 MG/DL — SIGNIFICANT CHANGE UP (ref 8.4–10.5)
CHLORIDE SERPL-SCNC: 102 MMOL/L — SIGNIFICANT CHANGE UP (ref 96–108)
CO2 SERPL-SCNC: 25 MMOL/L — SIGNIFICANT CHANGE UP (ref 22–31)
CREAT SERPL-MCNC: 0.67 MG/DL — SIGNIFICANT CHANGE UP (ref 0.5–1.3)
GLUCOSE SERPL-MCNC: 131 MG/DL — HIGH (ref 70–99)
HCT VFR BLD CALC: 27.2 % — LOW (ref 39–50)
HGB BLD-MCNC: 9.1 G/DL — LOW (ref 13–17)
LEGIONELLA AG UR QL: NEGATIVE — SIGNIFICANT CHANGE UP
MAGNESIUM SERPL-MCNC: 1.9 MG/DL — SIGNIFICANT CHANGE UP (ref 1.6–2.6)
MCHC RBC-ENTMCNC: 30.4 PG — SIGNIFICANT CHANGE UP (ref 27–34)
MCHC RBC-ENTMCNC: 33.5 GM/DL — SIGNIFICANT CHANGE UP (ref 32–36)
MCV RBC AUTO: 91 FL — SIGNIFICANT CHANGE UP (ref 80–100)
PHOSPHATE SERPL-MCNC: 2.6 MG/DL — SIGNIFICANT CHANGE UP (ref 2.5–4.5)
PLATELET # BLD AUTO: 202 K/UL — SIGNIFICANT CHANGE UP (ref 150–400)
POTASSIUM SERPL-MCNC: 3.9 MMOL/L — SIGNIFICANT CHANGE UP (ref 3.5–5.3)
POTASSIUM SERPL-SCNC: 3.9 MMOL/L — SIGNIFICANT CHANGE UP (ref 3.5–5.3)
RBC # BLD: 2.99 M/UL — LOW (ref 4.2–5.8)
RBC # FLD: 17.4 % — HIGH (ref 10.3–14.5)
SODIUM SERPL-SCNC: 134 MMOL/L — LOW (ref 135–145)
WBC # BLD: 4.41 K/UL — SIGNIFICANT CHANGE UP (ref 3.8–10.5)
WBC # FLD AUTO: 4.41 K/UL — SIGNIFICANT CHANGE UP (ref 3.8–10.5)

## 2018-04-23 PROCEDURE — 99222 1ST HOSP IP/OBS MODERATE 55: CPT

## 2018-04-23 PROCEDURE — 99231 SBSQ HOSP IP/OBS SF/LOW 25: CPT

## 2018-04-23 RX ORDER — BACITRACIN ZINC 500 UNIT/G
1 OINTMENT IN PACKET (EA) TOPICAL
Qty: 0 | Refills: 0 | Status: DISCONTINUED | OUTPATIENT
Start: 2018-04-23 | End: 2018-05-03

## 2018-04-23 RX ORDER — SODIUM CHLORIDE 0.65 %
1 AEROSOL, SPRAY (ML) NASAL
Qty: 0 | Refills: 0 | Status: DISCONTINUED | OUTPATIENT
Start: 2018-04-23 | End: 2018-04-26

## 2018-04-23 RX ORDER — HYDROXYZINE HCL 10 MG
10 TABLET ORAL ONCE
Qty: 0 | Refills: 0 | Status: COMPLETED | OUTPATIENT
Start: 2018-04-23 | End: 2018-04-23

## 2018-04-23 RX ADMIN — Medication 50 MILLIGRAM(S): at 06:01

## 2018-04-23 RX ADMIN — MIDODRINE HYDROCHLORIDE 5 MILLIGRAM(S): 2.5 TABLET ORAL at 22:59

## 2018-04-23 RX ADMIN — Medication 1 APPLICATION(S): at 17:38

## 2018-04-23 RX ADMIN — Medication 200 MILLIGRAM(S): at 22:59

## 2018-04-23 RX ADMIN — CLOPIDOGREL BISULFATE 75 MILLIGRAM(S): 75 TABLET, FILM COATED ORAL at 11:46

## 2018-04-23 RX ADMIN — MIDODRINE HYDROCHLORIDE 5 MILLIGRAM(S): 2.5 TABLET ORAL at 13:34

## 2018-04-23 RX ADMIN — Medication 0.5 MILLIGRAM(S): at 22:59

## 2018-04-23 RX ADMIN — Medication 1 MILLIGRAM(S): at 11:46

## 2018-04-23 RX ADMIN — Medication 100 MILLIGRAM(S): at 13:34

## 2018-04-23 RX ADMIN — PREGABALIN 500 MICROGRAM(S): 225 CAPSULE ORAL at 11:46

## 2018-04-23 RX ADMIN — Medication 1 SPRAY(S): at 17:39

## 2018-04-23 RX ADMIN — BUDESONIDE AND FORMOTEROL FUMARATE DIHYDRATE 2 PUFF(S): 160; 4.5 AEROSOL RESPIRATORY (INHALATION) at 17:39

## 2018-04-23 RX ADMIN — Medication 1 SPRAY(S): at 07:02

## 2018-04-23 RX ADMIN — Medication 81 MILLIGRAM(S): at 11:46

## 2018-04-23 RX ADMIN — HEPARIN SODIUM 5000 UNIT(S): 5000 INJECTION INTRAVENOUS; SUBCUTANEOUS at 22:59

## 2018-04-23 RX ADMIN — MIDODRINE HYDROCHLORIDE 5 MILLIGRAM(S): 2.5 TABLET ORAL at 06:01

## 2018-04-23 RX ADMIN — MONTELUKAST 10 MILLIGRAM(S): 4 TABLET, CHEWABLE ORAL at 11:46

## 2018-04-23 RX ADMIN — Medication 250 MILLIGRAM(S): at 07:01

## 2018-04-23 RX ADMIN — Medication 200 MILLIGRAM(S): at 06:01

## 2018-04-23 RX ADMIN — TIOTROPIUM BROMIDE 1 CAPSULE(S): 18 CAPSULE ORAL; RESPIRATORY (INHALATION) at 11:47

## 2018-04-23 RX ADMIN — FINASTERIDE 5 MILLIGRAM(S): 5 TABLET, FILM COATED ORAL at 11:46

## 2018-04-23 RX ADMIN — Medication 10 MILLIGRAM(S): at 02:01

## 2018-04-23 RX ADMIN — Medication 50 MILLIGRAM(S): at 13:33

## 2018-04-23 RX ADMIN — BUDESONIDE AND FORMOTEROL FUMARATE DIHYDRATE 2 PUFF(S): 160; 4.5 AEROSOL RESPIRATORY (INHALATION) at 06:01

## 2018-04-23 RX ADMIN — Medication 100 MILLIGRAM(S): at 06:01

## 2018-04-23 RX ADMIN — HEPARIN SODIUM 5000 UNIT(S): 5000 INJECTION INTRAVENOUS; SUBCUTANEOUS at 13:33

## 2018-04-23 RX ADMIN — Medication 0.5 MILLIGRAM(S): at 06:01

## 2018-04-23 RX ADMIN — Medication 100 MILLIGRAM(S): at 11:46

## 2018-04-23 RX ADMIN — Medication 1 APPLICATION(S): at 07:02

## 2018-04-23 NOTE — PROGRESS NOTE ADULT - SUBJECTIVE AND OBJECTIVE BOX
resting   s/p  epistaxis,  awaiting ent    REVIEW OF SYSTEMS:  CONSTITUTIONAL: No weakness,  no   fevers      RESPIRATORY:  No    shortness of breath  CARDIOVASCULAR: No chest pain,   GASTROINTESTINAL: No abdominal  pain  NEUROLOGICAL: No  focal  weakness    MEDICATIONS  (STANDING):  ALPRAZolam 0.5 milliGRAM(s) Oral two times a day  aspirin enteric coated 81 milliGRAM(s) Oral daily  aztreonam  IVPB 1000 milliGRAM(s) IV Intermittent every 8 hours  BACItracin   Ointment 1 Application(s) Topical two times a day  buDESOnide  80 MICROgram(s)/formoterol 4.5 MICROgram(s) Inhaler 2 Puff(s) Inhalation two times a day  clopidogrel Tablet 75 milliGRAM(s) Oral daily  cyanocobalamin 500 MICROGram(s) Oral daily  finasteride 5 milliGRAM(s) Oral daily  folic acid 1 milliGRAM(s) Oral daily  heparin  Injectable 5000 Unit(s) SubCutaneous every 8 hours  hydrocortisone sodium succinate Injectable 100 milliGRAM(s) IV Push every 8 hours  midodrine 5 milliGRAM(s) Oral every 8 hours  montelukast 10 milliGRAM(s) Oral daily  phenytoin   Capsule 200 milliGRAM(s) Oral two times a day  pyridoxine 100 milliGRAM(s) Oral daily  sodium chloride 0.65% Nasal 1 Spray(s) Both Nostrils two times a day  tiotropium 18 MICROgram(s) Capsule 1 Capsule(s) Inhalation daily    MEDICATIONS  (PRN):  acetaminophen   Tablet 650 milliGRAM(s) Oral every 6 hours PRN For Temp greater than 38 C (100.4 F)  ALBUTerol/ipratropium for Nebulization 3 milliLiter(s) Nebulizer every 6 hours PRN Shortness of Breath and/or Wheezing      Vital Signs Last 24 Hrs  T(C): 36.3 (23 Apr 2018 04:05), Max: 36.4 (22 Apr 2018 21:07)  T(F): 97.4 (23 Apr 2018 04:05), Max: 97.5 (22 Apr 2018 21:07)  HR: 67 (23 Apr 2018 04:05) (66 - 79)  BP: 103/64 (23 Apr 2018 04:05) (90/51 - 125/57)  BP(mean): 75 (22 Apr 2018 18:00) (65 - 82)  RR: 18 (23 Apr 2018 04:05) (18 - 50)  SpO2: 96% (23 Apr 2018 04:05) (77% - 100%)  CAPILLARY BLOOD GLUCOSE      POCT Blood Glucose.: 73 mg/dL (22 Apr 2018 17:21)  POCT Blood Glucose.: 139 mg/dL (22 Apr 2018 12:26)    I&O's Summary    22 Apr 2018 07:01  -  23 Apr 2018 07:00  --------------------------------------------------------  IN: 900 mL / OUT: 1250 mL / NET: -350 mL        PHYSICAL EXAM:  HEAD:  Atraumatic, Normocephalic  NECK: Supple, No JVD  CHEST/LUNG:   no     rales,     no,    rhonchi  HEART: Regular rate and rhythm;         murmur  ABDOMEN: Soft, Nontender, ;   EXTREMITIES:  less      edema  NEUROLOGY:  alert    LABS:                        9.1    4.41  )-----------( 202      ( 23 Apr 2018 07:30 )             27.2     04-23    134<L>  |  102  |  22  ----------------------------<  131<H>  3.9   |  25  |  0.67    Ca    9.3      23 Apr 2018 06:51  Phos  2.6     04-23  Mg     1.8     04-22    TPro  6.2  /  Alb  2.4<L>  /  TBili  0.2  /  DBili  x   /  AST  32  /  ALT  37  /  AlkPhos  238<H>  04-22      CARDIAC MARKERS ( 22 Apr 2018 02:29 )  x     / 0.15 ng/mL / 69 U/L / x     / 10.5 ng/mL  CARDIAC MARKERS ( 21 Apr 2018 20:06 )  x     / 0.18 ng/mL / 80 U/L / x     / 11.5 ng/mL  CARDIAC MARKERS ( 21 Apr 2018 12:09 )  x     / 0.16 ng/mL / 92 U/L / x     / 13.2 ng/mL                Thyroid Stimulating Hormone, Serum: 10.24 uIU/mL (04-21 @ 07:04)          Consultant(s) Notes Reviewed:      Care Discussed with Consultants/Other Providers:

## 2018-04-23 NOTE — CHART NOTE - NSCHARTNOTEFT_GEN_A_CORE
cc: epistaxis 1.5 hours   NP Note (episodic)     Called by RN because pt c/o epistaxis. States he woke up noticing nose bleed and was coughing up blood. The nosebleed lasted for about 1.5 hours and it was slowing down during this interview.   Denies HA, CP, SOB, cough, dizziness, abdominal  pain, N/V/D, numbness/tingling, extremity weakness, dysuria.  Pt did c/o of mild scratchy throat when he woke up. States he has had episodes in the past and during his admission. Denies pain at this time.     REVIEW OF SYSTEMS:  CONSTITUTIONAL: No weakness,  no   fevers      RESPIRATORY:  No    shortness of breath  CARDIOVASCULAR: No chest pain,   GASTROINTESTINAL: No abdominal  pain  NEUROLOGICAL: No  focal  weakness    Patient History:    Past Medical History:  Anxiety    Asthma    BCC (basal cell carcinoma)  skin  BPH (benign prostatic hyperplasia)    Cerebrovascular accident (CVA)    Chronic allergic rhinitis    Coronary artery disease  s/p 3 stents on June 30, 2017 at [place] Dr. Lavelle Reid  Esophagus cancer    Former smoker, stopped smoking in distant past    HTN (hypertension)    Hyperlipidemia    OA (osteoarthritis) of knee  right  Rheumatoid arthritis    Seizure disorder  1 episode approximately 15 yrs ago.     Past Surgical History:  Anastomotic leak following esophagectomy  [place] [facility]an esophagectomy  H/O heart artery stent  June 30, 2017  History of hernia repair    History of tonsillectomy    History of total hip replacement  left  Knee arthropathy  left.     Family History:  Family history of pulmonary embolism, father @ 49 yr old     Family history of heart attack    Allergies: PCN- hives     Physical Exam:    Vital Signs Last 24 Hrs  T(C): 36.3 (23 Apr 2018 04:05), Max: 36.4 (22 Apr 2018 21:07)  T(F): 97.4 (23 Apr 2018 04:05), Max: 97.5 (22 Apr 2018 21:07)  HR: 67 (23 Apr 2018 04:05) (66 - 79)  BP: 103/64 (23 Apr 2018 04:05) (90/51 - 125/57)  BP(mean): 75 (22 Apr 2018 18:00) (65 - 82)  RR: 18 (23 Apr 2018 04:05) (18 - 50)  SpO2: 96% (23 Apr 2018 04:05) (77% - 100%)    General: NAD, AOx3, nontoxic appearing  Integumentary: abrasions/lacs noted on arm and R upper chest wall, as per pt states he might have been scratching   Head:  NC/AT, no scleral injection, no JVD, + blood stains on tissues, slight active bleeding noted from nares, small amounts of blood noted in back of throat. No edema or erythema or lesions noted in pharynx, uvula midline.   CV: RRR, S1S2   Respiratory: CTA B/L, nonlabored  Extremities:  BLLE no edema         Labs:                          9.6    5.5   )-----------( 212      ( 22 Apr 2018 02:29 )             29.1     04-22    134<L>  |  103  |  21  ----------------------------<  139<H>  4.2   |  23  |  0.73    Ca    8.5      22 Apr 2018 02:29  Phos  2.9     04-22  Mg     1.8     04-22    TPro  6.2  /  Alb  2.4<L>  /  TBili  0.2  /  DBili  x   /  AST  32  /  ALT  37  /  AlkPhos  238<H>  04-22      Urinalysis Basic - ( 04-21 @ 05:15 )    Color: Few / Appearance: Negative / SG: -- / pH: Negative  Gluc: Negative / Ketone: Yellow  / Bili: -- / Urobili: --   Blood: -- / Protein: -- / Nitrite: Negative   Leuk Esterase: Negative / RBC: 1.029 / WBC --   Sq Epi: 30 / Non Sq Epi: 0-2 / Bacteria: 6.0      Assessment and Plan:     82 y/o M former smoker w/ hx of HTN, TIA, Seizures, RA, CAD s/p 6 stents most recently 4/17/18, Esophageal CA s/p chemo/RT and esophagogastrectomy recent admission for cardiac cath now presenting for altered mental status at home. Found ot be hypotensive in ED requiring pressors now off pressors transferred from MICU to , hypotension possibly from adrenal insufficiency plan for steroid taper p/w epistaxis that is improved with pressure. Small abrasions noted on R upper chest wall and R arm, as per pt, he might have been scratching while sleeping.     -hold AM dose of heparin SQ, c/w asa and plavix   -continue to monitor and apply pressure, consider afrin x 1 dose if not improving   -ocean spray BID   -bacitracin BID for abrasions   -f/u cbc     - will d/w team in      [place] [id number] cc: epistaxis 1.5 hours   NP Note (episodic)     Called by RN because pt c/o epistaxis. States he woke up noticing nose bleed and was coughing up blood. The nosebleed lasted for about 1.5 hours and it was slowing down during this interview.   Denies HA, CP, SOB, cough, dizziness, abdominal  pain, N/V/D, numbness/tingling, extremity weakness, dysuria.  Pt did c/o of mild scratchy throat when he woke up. States he has had episodes in the past and during his admission. Denies pain at this time.     REVIEW OF SYSTEMS:  CONSTITUTIONAL: No weakness,  no   fevers      RESPIRATORY:  No    shortness of breath  CARDIOVASCULAR: No chest pain,   GASTROINTESTINAL: No abdominal  pain  NEUROLOGICAL: No  focal  weakness    Patient History:    Past Medical History:  Anxiety    Asthma    BCC (basal cell carcinoma)  skin  BPH (benign prostatic hyperplasia)    Cerebrovascular accident (CVA)    Chronic allergic rhinitis    Coronary artery disease  s/p 3 stents on June 30, 2017 at [place] Dr. Lavelle Reid  Esophagus cancer    Former smoker, stopped smoking in distant past    HTN (hypertension)    Hyperlipidemia    OA (osteoarthritis) of knee  right  Rheumatoid arthritis    Seizure disorder  1 episode approximately 15 yrs ago.     Past Surgical History:  Anastomotic leak following esophagectomy  [place] [facility] esophagectomy  H/O heart artery stent  June 30, 2017  History of hernia repair    History of tonsillectomy    History of total hip replacement  left  Knee arthropathy  left.     Family History:  Family history of pulmonary embolism, father @ 49 yr old     Family history of heart attack    Allergies: PCN- hives     Physical Exam:    Vital Signs Last 24 Hrs  T(C): 36.3 (23 Apr 2018 04:05), Max: 36.4 (22 Apr 2018 21:07)  T(F): 97.4 (23 Apr 2018 04:05), Max: 97.5 (22 Apr 2018 21:07)  HR: 67 (23 Apr 2018 04:05) (66 - 79)  BP: 103/64 (23 Apr 2018 04:05) (90/51 - 125/57)  BP(mean): 75 (22 Apr 2018 18:00) (65 - 82)  RR: 18 (23 Apr 2018 04:05) (18 - 50)  SpO2: 96% (23 Apr 2018 04:05) (77% - 100%)    General: NAD, AOx3, nontoxic appearing  Integumentary: abrasions/lacs noted on arm and R upper chest wall, as per pt states he might have been scratching   Head:  NC/AT, no scleral injection, no JVD, + blood stains on tissues, slight active bleeding noted from nares, small amounts of blood noted in back of throat. No edema or erythema or lesions noted in pharynx, uvula midline.   CV: RRR, S1S2   Respiratory: CTA B/L, nonlabored  Extremities:  BLLE no edema         Labs:                          9.6    5.5   )-----------( 212      ( 22 Apr 2018 02:29 )             29.1     04-22    134<L>  |  103  |  21  ----------------------------<  139<H>  4.2   |  23  |  0.73    Ca    8.5      22 Apr 2018 02:29  Phos  2.9     04-22  Mg     1.8     04-22    TPro  6.2  /  Alb  2.4<L>  /  TBili  0.2  /  DBili  x   /  AST  32  /  ALT  37  /  AlkPhos  238<H>  04-22      Urinalysis Basic - ( 04-21 @ 05:15 )    Color: Few / Appearance: Negative / SG: -- / pH: Negative  Gluc: Negative / Ketone: Yellow  / Bili: -- / Urobili: --   Blood: -- / Protein: -- / Nitrite: Negative   Leuk Esterase: Negative / RBC: 1.029 / WBC --   Sq Epi: 30 / Non Sq Epi: 0-2 / Bacteria: 6.0      Assessment and Plan:     82 y/o M former smoker w/ hx of HTN, TIA, Seizures, RA, CAD s/p 6 stents most recently 4/17/18, Esophageal CA s/p chemo/RT and esophagogastrectomy recent admission for cardiac cath now presenting for altered mental status at home. Found ot be hypotensive in ED requiring pressors now off pressors transferred from MICU to , hypotension possibly from adrenal insufficiency plan for steroid taper p/w epistaxis that is improved with pressure. Small abrasions noted on R upper chest wall and R arm, as per pt, he might have been scratching while sleeping.     -hold AM dose of heparin SQ, c/w asa and plavix   -continue to monitor and apply pressure, consider afrin x 1 dose if not improving   -keep SBP <150   -ocean spray BID   -bacitracin BID for abrasions   -f/u cbc     - will d/w team in AM     [place] [id number]

## 2018-04-23 NOTE — DISCHARGE NOTE ADULT - MEDICATION SUMMARY - MEDICATIONS TO TAKE
I will START or STAY ON the medications listed below when I get home from the hospital:    Rolling walker  -- BARTOLO 99  R 53.1  -- Indication: For walker    finasteride 5 mg oral tablet  -- 1 tab(s) by mouth once a day (at bedtime)  -- Indication: For bph    predniSONE 10 mg oral tablet  -- 1 tab(s) by mouth once a day  -- Indication: For RA    aspirin 81 mg oral delayed release tablet  -- 1 tab(s) by mouth once a day  -- Indication: For Cad    phenytoin 200 mg oral capsule, extended release  -- 1 cap(s) by mouth 2 times a day  -- Indication: For Seizure    simvastatin 40 mg oral tablet  -- 1 tab(s) by mouth once a day in pm    -- Indication: For Hld    clopidogrel 75 mg oral tablet  -- 1 tab(s) by mouth once a day MDD:1  -- Indication: For Cad    ProAir HFA 90 mcg/inh inhalation aerosol  -- 2 puff(s) inhaled 4 times a day, As Needed     -- Indication: For Asthma    budesonide-formoterol 80 mcg-4.5 mcg/inh inhalation aerosol  -- 2 puff(s) inhaled 2 times a day  -- Indication: For Asthma    tiotropium 18 mcg inhalation capsule  -- 1 cap(s) inhaled once a day  -- Indication: For Asthma    montelukast 10 mg oral tablet  -- 1 tab(s) by mouth once a day  -- Indication: For Asthma    midodrine 10 mg oral tablet  -- 1 tab(s) by mouth 3 times a day; 7am, 12pm, 5pm  -- Indication: For Hypotension, unspecified hypotension type    sodium chloride 0.65% nasal spray  -- 1 spray(s) into nose 4 times a day  -- Indication: For nasal spray    levothyroxine 25 mcg (0.025 mg) oral tablet  -- 1 tab(s) by mouth once a day  -- Indication: For Hypothyroidism, unspecified type    folic acid 1 mg oral tablet  -- 1 tab(s) by mouth once a day  -- Indication: For vitamin    Vitamin B6 100 mg oral tablet  -- 1 tab(s) by mouth once a day    -- Indication: For vitamin    Vitamin B-12 500 mcg oral tablet  -- 1 tab(s) by mouth once a day    -- Indication: For vitamin

## 2018-04-23 NOTE — CONSULT NOTE ADULT - ASSESSMENT
82 y/o Esophageal Ca. CAD recent admission with stent placement admitted with fatigue, hypotension, hypoglycemia, and hypothermia. Patient on chronic steroids for RA.    Patient says that in the past year he lost 45 lbs. following a diagnosis of esophageal Ca, adjuvant chemo/ RT and surgery 8/17. He had multiple stents placed during this time, recently 4/17. After discharge he was compliant with prednisone 5 mg daily, no fever, no chills, but noted worsening fatigue at night. He was admitted with complaints of chest pain, however on admission he was noted with hypothermia, hypotension and hypoglycemia. All resolved with stress dose steroids. He was started on epimeric antibiotics, but no clear source of infection noted.   Symptoms on admission resolved with steroids, except low BP- patient is off his BP meds, started on midodrine.  Still on high dose hydrocortisone.    Although the picture is multifactorial, significant weight loss, recent procedure, possible infection, the combination of hypotension, hypothermia and hypoglycemia on admission need further evaluation. An acute infection with relative hypoadrenalism in a chronically steroid treated patient is a posibility, however no infection was detected, and he improved rapidly.  With the abnormal thyroid function tests will need to r/o pituitary disease: although he had a recent dose of contrast dye and was on high dose steroids TSH level is "to low" for the T4 and FT3 levels.    Can start steroid tape to PO.

## 2018-04-23 NOTE — DISCHARGE NOTE ADULT - CARE PROVIDER_API CALL
Leif Santiago), Cardiovascular Disease; Internal Medicine  [address] [place] [address]  Phone: [phone]  Fax: [phone]    William Juarez), Internal Medicine; Pulmonary Disease  [address] [facility] [address]  [place] [address]  Phone: [phone]  Fax: [phone] Leif Santiago), Cardiovascular Disease; Internal Medicine  [address] [place] [address]  Phone: [phone]  Fax: [phone]    William Juarez), Internal Medicine; Pulmonary Disease  [address] [facility]  [address]  [place] [address]  Phone: [phone]  Fax: [phone]    Justin Reid), EndocrinologyMetabDiabetes; Internal Medicine  [address] [facility] [address]  [place] [address]  Phone: [phone]  Fax: [phone]    Christian Weinstein), Surgery; Thoracic Surgery  [address] [place] [address]  [place] [address]  Phone: [phone]  Fax: [phone]

## 2018-04-23 NOTE — DISCHARGE NOTE ADULT - PATIENT PORTAL LINK FT
You can access the [email or web address], offered by [facility], by registering with the following website: [email or web address]

## 2018-04-23 NOTE — DISCHARGE NOTE ADULT - PROVIDER TOKENS
TOKEN:'[id number]:'[id number]' TOKEN:'[id number]:'[id number]:'[id number]:'[id number]'

## 2018-04-23 NOTE — DISCHARGE NOTE ADULT - NS AS DC FOLLOWUP STROKE INST
Heart Failure/Smoking Cessation/Influenza vaccination (VIS Pub Date: August 7, 2015)/Stroke (includes: TIA/SAH/ICH/Ischemic Stroke) Heart Failure/Influenza vaccination (VIS Pub Date: August 7, 2015)/Smoking Cessation Heart Failure/Smoking Cessation

## 2018-04-23 NOTE — DISCHARGE NOTE ADULT - HOSPITAL COURSE
81 yo male with PMH of HTN,   asthma, seizure,CVA, esophageal Ca,  cachexia, RA  on prednisone, anemia, CAD with stents, s./p  recent cath,   with ROJAS to  LAD and angioplasty, D1, ON 4/17,  and since then, had  hypotension. Pt was  on  midodrine on last admission. Pt's BP improved after pressors and stress dose hydrocortisone. No apparent infection per ID.     Pt  is DNR now. Echo  with modertae AS. / ?  severe. ASA/ plavix   given recent PCI. ENT  eval for h/o epistaxis, c/w saline nasal  spray . Steroids changed to prednisone , with Florinef per  Endo . No adrenal insufficiency per Endo.  Dysphagia : no artificial  feeding per  family.  and  pt wants  regular diet. Transfused for symptomatic anemia, hgb 8.2 and hemoptysis. CT chest showed no pneumonia. Pulm consulted. 84 y/o Esophageal Ca. CAD recent admission with stent placement admitted with fatigue, hypotension, hypoglycemia, and hypothermia. Patient on chronic steroids for RA.    Patient says that in the past year he lost 45 lbs. following a diagnosis of esophageal Ca, adjuvant chemo/ RT and surgery 8/17. He had multiple stents placed during this time, recently 4/17. After discharge he was compliant with prednisone 5 mg daily, no fever, no chills, but noted worsening fatigue at night. He was admitted with complaints of chest pain, however on admission he was noted with hypothermia, hypotension and hypoglycemia. All resolved with stress dose steroids. He was started on epimeric antibiotics, but no clear source of infection noted.   Symptoms on admission resolved with steroids, except low BP- patient is off his BP meds, started on midodrine.  Still on high dose hydrocortisone.    TFT's abnormality mostly due to treatment with dilantin and recent iodine dye- no evidence for pituitary disease.  Low dose synthroid started for persistant TSH elevation despite days elapsed from iodine load.    Patient transfused for decreased H/H.  On full dose midodrine    d/c home

## 2018-04-23 NOTE — DISCHARGE NOTE ADULT - SECONDARY DIAGNOSIS.
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

## 2018-04-23 NOTE — DISCHARGE NOTE ADULT - MEDICATION SUMMARY - MEDICATIONS TO CHANGE
I will SWITCH the dose or number of times a day I take the medications listed below when I get home from the hospital:    predniSONE 20 mg oral tablet  -- 1 tab(s) by mouth once a day    midodrine 5 mg oral tablet  -- 1 tab(s) by mouth 3 times a day   -- It is very important that you take or use this exactly as directed.  Do not skip doses or discontinue unless directed by your doctor.  Obtain medical advice before taking any non-prescription drugs as some may affect the action of this medication.

## 2018-04-23 NOTE — DISCHARGE NOTE ADULT - CARE PLAN
Principal Discharge DX:	Hypotension, unspecified hypotension type  Secondary Diagnosis:	Adrenal insufficiency due to steroid withdrawal  Secondary Diagnosis:	Coronary artery disease involving native coronary artery of native heart without angina pectoris  Secondary Diagnosis:	Seizure disorder  Secondary Diagnosis:	Recurrent epistaxis  Secondary Diagnosis:	Rheumatoid arthritis Principal Discharge DX:	Hypotension, unspecified hypotension type  Goal:	resolution of symptoms  Assessment and plan of treatment:	continue current treatment plan  follow up with PMD  Secondary Diagnosis:	Adrenal insufficiency due to steroid withdrawal  Assessment and plan of treatment:	continue current treatment plan  follow up with PMD  Secondary Diagnosis:	Coronary artery disease involving native coronary artery of native heart without angina pectoris  Assessment and plan of treatment:	Coronary artery disease is a condition where the arteries the supply the heart muscle get clogges with fatty deposits & puts you at risk for a heart attack  Call your doctor if you have any new pain, pressure, or discomfort in the center of your chest, pain, tingling or discomfort in arms, back, neck, jaw, or stomach, shortness of breath, nausea, vomiting, burping or heartburn, sweating, cold and clammy skin, racing or abnormal heartbeat for more than 10 minutes or if they keep coming & going.  Call 911 and do not tr to get to hospital by care  You can help yourself with lefestyle changes (quitting smoking if you smoke), eat lots of fruits & vegetables & low fat dairy products, not a lot of meat & fatty foods, walk or some form of physical activity most days of the week, lose weight if you are overweight  Take your cardiac medication as prescribed to lower cholesterol, to lower blood pressure, aspirin to prevent blood clots, and diabetes control  Make sure to keep appointments with doctor for cardiac follow up care  Secondary Diagnosis:	Seizure disorder  Assessment and plan of treatment:	continue current treatment plan  follow up with neuro  Secondary Diagnosis:	Recurrent epistaxis  Assessment and plan of treatment:	continue current treatment plan  follow up with ENT  Secondary Diagnosis:	Rheumatoid arthritis  Assessment and plan of treatment:	continue current treatment plan  follow up with rehumatology Principal Discharge DX:	Hypotension, unspecified hypotension type  Goal:	resolution of symptoms  Assessment and plan of treatment:	Continue medications as prescribed  Secondary Diagnosis:	Adrenal insufficiency due to steroid withdrawal  Assessment and plan of treatment:	Continue medications as prescribed  Secondary Diagnosis:	Coronary artery disease involving native coronary artery of native heart without angina pectoris  Assessment and plan of treatment:	Coronary artery disease is a condition where the arteries the supply the heart muscle get clogges with fatty deposits & puts you at risk for a heart attack  Call your doctor if you have any new pain, pressure, or discomfort in the center of your chest, pain, tingling or discomfort in arms, back, neck, jaw, or stomach, shortness of breath, nausea, vomiting, burping or heartburn, sweating, cold and clammy skin, racing or abnormal heartbeat for more than 10 minutes or if they keep coming & going.  Call 911 and do not tr to get to hospital by care  You can help yourself with lefestyle changes (quitting smoking if you smoke), eat lots of fruits & vegetables & low fat dairy products, not a lot of meat & fatty foods, walk or some form of physical activity most days of the week, lose weight if you are overweight  Take your cardiac medication as prescribed to lower cholesterol, to lower blood pressure, aspirin to prevent blood clots, and diabetes control  Make sure to keep appointments with doctor for cardiac follow up care  Secondary Diagnosis:	Hypothyroidism, unspecified type  Assessment and plan of treatment:	you do not make enough thyroid hormone  signs & symptoms of low levels of thyroid hormone - tired, getting cold easily, coarse or thin hair, constipation, shortness of breath, swelling, irregular periods  your doctor will do thyroid hormone blood tests at least once a year to monitor if medication dose is adequate  take your thyroid medicine as directed by your doctor & on empty stomach  Secondary Diagnosis:	Recurrent epistaxis  Assessment and plan of treatment:	Nasal saline to each nostril 4x per day  Bacitracin to nares two times a day  Avoid nasal trauma  Avoid nose picking  Secondary Diagnosis:	Anemia, unspecified type  Assessment and plan of treatment:	Received blood transfusion  Follow up with PMD to monitor blood count Principal Discharge DX:	Hypotension, unspecified hypotension type  Goal:	resolution of symptoms  Assessment and plan of treatment:	Continue medications as prescribed  Secondary Diagnosis:	Adrenal insufficiency due to steroid withdrawal  Assessment and plan of treatment:	Continue medications as prescribed  Secondary Diagnosis:	Coronary artery disease involving native coronary artery of native heart without angina pectoris  Assessment and plan of treatment:	Coronary artery disease is a condition where the arteries the supply the heart muscle get clogged with fatty deposits & puts you at risk for a heart attack  Call your doctor if you have any new pain, pressure, or discomfort in the center of your chest, pain, tingling or discomfort in arms, back, neck, jaw, or stomach, shortness of breath, nausea, vomiting, burping or heartburn, sweating, cold and clammy skin, racing or abnormal heartbeat for more than 10 minutes or if they keep coming & going.  Call 911 and do not tr to get to hospital by care  You can help yourself with lefestyle changes (quitting smoking if you smoke), eat lots of fruits & vegetables & low fat dairy products, not a lot of meat & fatty foods, walk or some form of physical activity most days of the week, lose weight if you are overweight  Take your cardiac medication as prescribed to lower cholesterol, to lower blood pressure, aspirin to prevent blood clots, and diabetes control  Make sure to keep appointments with doctor for cardiac follow up care  Secondary Diagnosis:	Hypothyroidism, unspecified type  Assessment and plan of treatment:	you do not make enough thyroid hormone  signs & symptoms of low levels of thyroid hormone - tired, getting cold easily, coarse or thin hair, constipation, shortness of breath, swelling, irregular periods  your doctor will do thyroid hormone blood tests at least once a year to monitor if medication dose is adequate  take your thyroid medicine as directed by your doctor & on empty stomach  Secondary Diagnosis:	Recurrent epistaxis  Assessment and plan of treatment:	Nasal saline to each nostril 4x per day  Bacitracin to nares two times a day  Avoid nasal trauma  Avoid nose picking  Secondary Diagnosis:	Anemia, unspecified type  Assessment and plan of treatment:	Received blood transfusion  Follow up with PMD/ Gastroenterologist to monitor blood count  Secondary Diagnosis:	Mild intermittent asthma without complication  Assessment and plan of treatment:	Asthma attacks happen when the airways in the lungs become narrow and inflamed  Asthma symptoms can include - Wheezing or noisy breathing, coughing, tight feeling in the chest, shortness of breath  Almost everyone with asthma has a quick-relief inhaler that works in 5- 10mins that they carry with them and long-term controller medicines control asthma and prevent future symptoms. People with frequent asthma symptoms take these 1 or 2 times each day.  You can stay away from things that cause your symptoms or make them worse. Doctors call these "triggers."  avoid them as much as possible. Some common triggers include - Dust, mold, animals, such as dogs and cats, pollen and plants, cigarette smoke, getting sick with a cold or flu (that's why it's important to get a flu shot), stress  Talk to your caregiver about an action plan for managing asthma attacks. This includes the use of a peak flow meter which measures the severity of the attack and medicines that can help stop the attack.   SEEK MEDICAL CARE IF:  You have wheezing, shortness of breath, or a cough even if taking medicine to prevent attacks.   You have thickening of sputum, sputum changes from clear or white to yellow, green, gray, or bloody.   You have any problems that may be related to the medicines you are taking (such as a rash, itching, swelling, or trouble breathing).  You are using a reliever medicine more than 2–3 times per week.  SEEK IMMEDIATE MEDICAL CARE IF:  You are short of breath even at rest,or with very little physical activity, chest pain, heart beating fast, bluish color to your lips or fingernails, fever, dizziness,   You seem to be getting worse and are unresponsive to treatment during an asthma attack.

## 2018-04-23 NOTE — DISCHARGE NOTE ADULT - ADDITIONAL INSTRUCTIONS
Follow-up with your primary care physician, within 1 week . Call for appointment. Follow-up with your primary care physician, within 1 week . Call for appointment.  Follow up with PMD/ Endocrinologist for repeat thyroid function tests in 2-4 weeks

## 2018-04-23 NOTE — CONSULT NOTE ADULT - ASSESSMENT
82yoM with H/O of recurrent epistaxis with known Septal Perforation with episode of epistaxis overnight self resolved, with no active bleeding on exam

## 2018-04-23 NOTE — CONSULT NOTE ADULT - ASSESSMENT
Complicated patient with underlying CAD s/p stents,history of esophagectomy for cancer,RA on steroids,BPH who was admitted on 4/21 hypothermic,hypotensive, and hypoglycemic.  No clear localizing signs of infection.He has rapid turnaround of abnormal vitals with fluids and steroids.  His urine isolate is likely a colonizer or contaminant.He has no  symptoms  I would be conservative with antibiotic use  Suggest:  1.Stop aztreonam  2.Monitor conservatively  3.Additional ID w/u as indicated.Reviewed with his wife at the bedside.  4.thanks, will follow.

## 2018-04-23 NOTE — DISCHARGE NOTE ADULT - PLAN OF CARE
Lary Cardozo is a 53 y/o woman with medical history of R MCA stroke, with residual LSW, Afib on anticoagulation (eliquis), CAD / HTN / CHF on amiodarone, CCB, HCTZ, BB, nitrate presenting as transfer from    from [facility] for acute L MCA stroke.     History per records. Patient presented with weakness and fall with abdominal pain. Admitted for suspicion for GIB, however while in ED had a seizure, required intubation. CT showed acute L MCA occlusion.     At [facility], CTA revealed LVO / M1 and admitted for neurointerventional procedure / NICU close monitoring.    resolution of symptoms continue current treatment plan  follow up with PMD Coronary artery disease is a condition where the arteries the supply the heart muscle get clogges with fatty deposits & puts you at risk for a heart attack  Call your doctor if you have any new pain, pressure, or discomfort in the center of your chest, pain, tingling or discomfort in arms, back, neck, jaw, or stomach, shortness of breath, nausea, vomiting, burping or heartburn, sweating, cold and clammy skin, racing or abnormal heartbeat for more than 10 minutes or if they keep coming & going.  Call 911 and do not tr to get to hospital by care  You can help yourself with lefestyle changes (quitting smoking if you smoke), eat lots of fruits & vegetables & low fat dairy products, not a lot of meat & fatty foods, walk or some form of physical activity most days of the week, lose weight if you are overweight  Take your cardiac medication as prescribed to lower cholesterol, to lower blood pressure, aspirin to prevent blood clots, and diabetes control  Make sure to keep appointments with doctor for cardiac follow up care continue current treatment plan  follow up with neuro continue current treatment plan  follow up with ENT continue current treatment plan  follow up with rehumatology Continue medications as prescribed you do not make enough thyroid hormone  signs & symptoms of low levels of thyroid hormone - tired, getting cold easily, coarse or thin hair, constipation, shortness of breath, swelling, irregular periods  your doctor will do thyroid hormone blood tests at least once a year to monitor if medication dose is adequate  take your thyroid medicine as directed by your doctor & on empty stomach Nasal saline to each nostril 4x per day  Bacitracin to nares two times a day  Avoid nasal trauma  Avoid nose picking Received blood transfusion  Follow up with PMD to monitor blood count Coronary artery disease is a condition where the arteries the supply the heart muscle get clogged with fatty deposits & puts you at risk for a heart attack  Call your doctor if you have any new pain, pressure, or discomfort in the center of your chest, pain, tingling or discomfort in arms, back, neck, jaw, or stomach, shortness of breath, nausea, vomiting, burping or heartburn, sweating, cold and clammy skin, racing or abnormal heartbeat for more than 10 minutes or if they keep coming & going.  Call 911 and do not tr to get to hospital by care  You can help yourself with lefestyle changes (quitting smoking if you smoke), eat lots of fruits & vegetables & low fat dairy products, not a lot of meat & fatty foods, walk or some form of physical activity most days of the week, lose weight if you are overweight  Take your cardiac medication as prescribed to lower cholesterol, to lower blood pressure, aspirin to prevent blood clots, and diabetes control  Make sure to keep appointments with doctor for cardiac follow up care Received blood transfusion  Follow up with PMD/ Gastroenterologist to monitor blood count Asthma attacks happen when the airways in the lungs become narrow and inflamed  Asthma symptoms can include - Wheezing or noisy breathing, coughing, tight feeling in the chest, shortness of breath  Almost everyone with asthma has a quick-relief inhaler that works in 5- 10mins that they carry with them and long-term controller medicines control asthma and prevent future symptoms. People with frequent asthma symptoms take these 1 or 2 times each day.  You can stay away from things that cause your symptoms or make them worse. Doctors call these "triggers."  avoid them as much as possible. Some common triggers include - Dust, mold, animals, such as dogs and cats, pollen and plants, cigarette smoke, getting sick with a cold or flu (that's why it's important to get a flu shot), stress  Talk to your caregiver about an action plan for managing asthma attacks. This includes the use of a peak flow meter which measures the severity of the attack and medicines that can help stop the attack.   SEEK MEDICAL CARE IF:  You have wheezing, shortness of breath, or a cough even if taking medicine to prevent attacks.   You have thickening of sputum, sputum changes from clear or white to yellow, green, gray, or bloody.   You have any problems that may be related to the medicines you are taking (such as a rash, itching, swelling, or trouble breathing).  You are using a reliever medicine more than 2–3 times per week.  SEEK IMMEDIATE MEDICAL CARE IF:  You are short of breath even at rest,or with very little physical activity, chest pain, heart beating fast, bluish color to your lips or fingernails, fever, dizziness,   You seem to be getting worse and are unresponsive to treatment during an asthma attack.

## 2018-04-23 NOTE — PROGRESS NOTE ADULT - SUBJECTIVE AND OBJECTIVE BOX
- Patient seen and examined.  - In summary, patient is a 82 year old man who presented with weakness and fatigue. (2018 11:11)         *****MEDICATIONS:    MEDICATIONS  (STANDING):  ALPRAZolam 0.5 milliGRAM(s) Oral two times a day  aspirin enteric coated 81 milliGRAM(s) Oral daily  aztreonam  IVPB 1000 milliGRAM(s) IV Intermittent every 8 hours  BACItracin   Ointment 1 Application(s) Topical two times a day  buDESOnide  80 MICROgram(s)/formoterol 4.5 MICROgram(s) Inhaler 2 Puff(s) Inhalation two times a day  clopidogrel Tablet 75 milliGRAM(s) Oral daily  cyanocobalamin 500 MICROGram(s) Oral daily  finasteride 5 milliGRAM(s) Oral daily  folic acid 1 milliGRAM(s) Oral daily  heparin  Injectable 5000 Unit(s) SubCutaneous every 8 hours  hydrocortisone sodium succinate Injectable 100 milliGRAM(s) IV Push every 8 hours  midodrine 5 milliGRAM(s) Oral every 8 hours  montelukast 10 milliGRAM(s) Oral daily  phenytoin   Capsule 200 milliGRAM(s) Oral two times a day  pyridoxine 100 milliGRAM(s) Oral daily  sodium chloride 0.65% Nasal 1 Spray(s) Both Nostrils two times a day  tiotropium 18 MICROgram(s) Capsule 1 Capsule(s) Inhalation daily    MEDICATIONS  (PRN):  acetaminophen   Tablet 650 milliGRAM(s) Oral every 6 hours PRN For Temp greater than 38 C (100.4 F)  ALBUTerol/ipratropium for Nebulization 3 milliLiter(s) Nebulizer every 6 hours PRN Shortness of Breath and/or Wheezing             ***** REVIEW OF SYSTEM:  GEN: no fever, no chills, no pain  RESP: no SOB, no cough, no sputum  CVS: no chest pain, no palpitations, no edema  GI: no abdominal pain, no nausea, no vomiting, no constipation, no diarrhea  : no dysuria, no frequency  NEURO: no headache, no dizziness  PSYCH: no depression, not anxious  Derm : no itching, no rash         ***** VITAL SIGNS:    T(F): 97.4 (18 @ 04:05), Max: 97.5 (18 @ 21:07)  HR: 67 (18 @ 04:05) (66 - 79)  BP: 103/64 (18 @ 04:05) (90/51 - 125/57)  RR: 18 (18 @ 04:05) (18 - 50)  SpO2: 96% (18 @ 04:05) (77% - 100%)  Wt(kg): --  ,   I&O's Summary    2018 07:  -  2018 07:00  --------------------------------------------------------  IN: 900 mL / OUT: 1250 mL / NET: -350 mL    2018 07:  -  2018 09:26  --------------------------------------------------------  IN: 0 mL / OUT: 200 mL / NET: -200 mL             *****PHYSICAL EXAM:  GEN: A&O X 3 , NAD , comfortable  HEENT: NCAT, EOMI, MMM, no icterus  NECK: Supple, No JVD  CVS: S1S2 , regular , No M/R/G appreciated  PULM: CTA B/L,  no W/R/R appreciated  ABD.: soft. non tender, non distended,  bowel sounds present  Extrem: intact pulses , no edema noted  Derm: No rash or ecchymosis noted  PSYCH: normal mood, no depression, not anxious         *****LAB AND IMAGIN.1    4.41  )-----------( 202      ( 2018 07:30 )             27.2                   134<L>  |  102  |  22  ----------------------------<  131<H>  3.9   |  25  |  0.67    Ca    9.3      2018 06:51  Phos  2.6       Mg     1.9     04-23    TPro  6.2  /  Alb  2.4<L>  /  TBili  0.2  /  DBili  x   /  AST  32  /  ALT  37  /  AlkPhos  238<H>  04-22           CARDIAC MARKERS ( 2018 02:29 )  x     / 0.15 ng/mL / 69 U/L / x     / 10.5 ng/mL  CARDIAC MARKERS ( 2018 20:06 )  x     / 0.18 ng/mL / 80 U/L / x     / 11.5 ng/mL  CARDIAC MARKERS ( 2018 12:09 )  x     / 0.16 ng/mL / 92 U/L / x     / 13.2 ng/mL      [All pertinent recent Imaging/Reports reviewed]         *****A S S E S S M E N T   A N D   P L A N :  82M with HTN, Asthma, CVA, seizures, CAD who presents with mild chest discomfort, fatigue, diaphoresis, hypothermia, hypoglycemia, hypotension  abx for PNA  keep O=I  DAPT s/p recent PCI  BB when BP allows  echo pending  consider endo eval  ENT to eval    __________________________  A. TRE Brady.

## 2018-04-23 NOTE — CONSULT NOTE ADULT - SUBJECTIVE AND OBJECTIVE BOX
CC: I had a nose bleed at 4am    HPI:84 y/o M former smoker w/ hx of HTN, TIA, Seizures, RA, CAD s/p 6 stents most recently 4/17/18, Esophageal CA s/p chemo/RT and esophagogastrectomy recent admission for cardiac cath admitted for altered mental status/Hypotension  . We are asked to evaluate the patient for epistaxis early this morning. Pt states, this AM his throat felt a little dry, he coughed, up a clot, and started bleeding from his nose, pressure was applied, and he was given Afrin GTT, Nasal Saline, with resolution of bleeding. No bleeding since this mornings episode. No Modifying factors. Pt reports h/o recurrent epistaxis, with self resolution, has known h/o  nasal septal perforation pt denies Fevers/chills. Dysphagia/odynophagia/hemoptysis/unintentional weight loss. Any other relevant history/symptoms.    PAST MEDICAL & SURGICAL HISTORY:  Coronary artery disease: s/p 3 stents on June 30, 2017 at [place] Dr. Lavelle Reid  OA (osteoarthritis) of knee: right  Former smoker, stopped smoking in distant past  Rheumatoid arthritis  Seizure disorder: 1 episode approximately 15 yrs ago  Asthma  Hyperlipidemia  BPH (benign prostatic hyperplasia)  HTN (hypertension)  Esophagus cancer  Chronic allergic rhinitis  BCC (basal cell carcinoma): skin  Cerebrovascular accident (CVA)  Anxiety  Anastomotic leak following esophagectomy: Rogers logan esophagectomy  History of tonsillectomy  H/O heart artery stent: June 30, 2017  Knee arthropathy: left  History of total hip replacement: left  History of hernia repair    Allergies    penicillin (Rash)    Intolerances      MEDICATIONS  (STANDING):  ALPRAZolam 0.5 milliGRAM(s) Oral two times a day  aspirin enteric coated 81 milliGRAM(s) Oral daily  aztreonam  IVPB 1000 milliGRAM(s) IV Intermittent every 8 hours  BACItracin   Ointment 1 Application(s) Topical two times a day  buDESOnide  80 MICROgram(s)/formoterol 4.5 MICROgram(s) Inhaler 2 Puff(s) Inhalation two times a day  clopidogrel Tablet 75 milliGRAM(s) Oral daily  cyanocobalamin 500 MICROGram(s) Oral daily  finasteride 5 milliGRAM(s) Oral daily  folic acid 1 milliGRAM(s) Oral daily  heparin  Injectable 5000 Unit(s) SubCutaneous every 8 hours  hydrocortisone sodium succinate Injectable 100 milliGRAM(s) IV Push every 8 hours  midodrine 5 milliGRAM(s) Oral every 8 hours  montelukast 10 milliGRAM(s) Oral daily  phenytoin   Capsule 200 milliGRAM(s) Oral two times a day  pyridoxine 100 milliGRAM(s) Oral daily  sodium chloride 0.65% Nasal 1 Spray(s) Both Nostrils two times a day  tiotropium 18 MICROgram(s) Capsule 1 Capsule(s) Inhalation daily    MEDICATIONS  (PRN):  acetaminophen   Tablet 650 milliGRAM(s) Oral every 6 hours PRN For Temp greater than 38 C (100.4 F)  ALBUTerol/ipratropium for Nebulization 3 milliLiter(s) Nebulizer every 6 hours PRN Shortness of Breath and/or Wheezing    Social History: Former smoker, denies ETOH/substance abuse    ROS: ENT, GI, , CV, Pulm, Neuro, Psych, MS, Heme, Endo, Constitutional all negative except as noted in HPI    Vital Signs Last 24 Hrs  T(C): 36.3 (23 Apr 2018 04:05), Max: 36.4 (22 Apr 2018 21:07)  T(F): 97.4 (23 Apr 2018 04:05), Max: 97.5 (22 Apr 2018 21:07)  HR: 67 (23 Apr 2018 04:05) (66 - 79)  BP: 103/64 (23 Apr 2018 04:05) (90/51 - 125/57)  BP(mean): 75 (22 Apr 2018 18:00) (65 - 82)  RR: 18 (23 Apr 2018 04:05) (18 - 50)  SpO2: 96% (23 Apr 2018 04:05) (77% - 100%)                          9.1    4.41  )-----------( 202      ( 23 Apr 2018 07:30 )             27.2    04-23    134<L>  |  102  |  22  ----------------------------<  131<H>  3.9   |  25  |  0.67    Ca    9.3      23 Apr 2018 06:51  Phos  2.6     04-23  Mg     1.9     04-23    TPro  6.2  /  Alb  2.4<L>  /  TBili  0.2  /  DBili  x   /  AST  32  /  ALT  37  /  AlkPhos  238<H>  04-22       PHYSICAL EXAM:  Gen: NAD, well-developed  Head: Normocephalic, Atraumatic  Face: no edema/erythema/fluctuance, parotid glands soft without mass  Eyes: PERRL, EOMI, no scleral injection  Nose:  Nares bilaterally patent,  with no active bleeding Dry blood in b/l Nares Large Septal Perforation ( chronic) no discharge  Mouth: Mucosa moist, tongue/uvula midline, oropharynx clear no blood in posterior OP  Neck: Flat, supple, no lymphadenopathy, trachea midline, no masses

## 2018-04-23 NOTE — CONSULT NOTE ADULT - SUBJECTIVE AND OBJECTIVE BOX
HPI:   Patient is a 82y male with a past history of RA maintained on prednisone, esophagectomy in August of 2017 with post op leak,CAD and multiple cardiac stents, s/p placement of a cardiac stent last week, d/c home only to be admitted 24 hours later with hypoglycemia,low BP, and hypothermia.He had a BP in 70's,glucose level of 41, and temp of 93.He has received steroids and antibiotics.He feels okay today.He was started  on midodrine last week for hypotension.He has been without any fever.No recent documented infection.No respiratory ,GI,or  symptoms.He has noted recent chest pain syndrome which was felt to be cardiac.No recent travel, wife has been well.    REVIEW OF SYSTEMS:  All other review of systems negative (Comprehensive ROS)epistaxis,left sided chest pain,diapohoresis,weakness    PAST MEDICAL & SURGICAL HISTORY:  Coronary artery disease: s/p 3 stents on June 30, 2017 at [place] Dr. 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  History of tonsillectomy  H/O heart artery stent: June 30, 2017  Knee arthropathy: left  History of total hip replacement: left  History of hernia repair      Allergies    penicillin (Rash)    Intolerances        Antimicrobials Day #  :day 2  aztreonam  IVPB 1000 milliGRAM(s) IV Intermittent every 8 hours    Other Medications:  acetaminophen   Tablet 650 milliGRAM(s) Oral every 6 hours PRN  ALBUTerol/ipratropium for Nebulization 3 milliLiter(s) Nebulizer every 6 hours PRN  ALPRAZolam 0.5 milliGRAM(s) Oral two times a day  aspirin enteric coated 81 milliGRAM(s) Oral daily  BACItracin   Ointment 1 Application(s) Topical two times a day  buDESOnide  80 MICROgram(s)/formoterol 4.5 MICROgram(s) Inhaler 2 Puff(s) Inhalation two times a day  clopidogrel Tablet 75 milliGRAM(s) Oral daily  cyanocobalamin 500 MICROGram(s) Oral daily  finasteride 5 milliGRAM(s) Oral daily  folic acid 1 milliGRAM(s) Oral daily  heparin  Injectable 5000 Unit(s) SubCutaneous every 8 hours  hydrocortisone sodium succinate Injectable 100 milliGRAM(s) IV Push every 8 hours  midodrine 5 milliGRAM(s) Oral every 8 hours  montelukast 10 milliGRAM(s) Oral daily  phenytoin   Capsule 200 milliGRAM(s) Oral two times a day  pyridoxine 100 milliGRAM(s) Oral daily  sodium chloride 0.65% Nasal 1 Spray(s) Both Nostrils two times a day  tiotropium 18 MICROgram(s) Capsule 1 Capsule(s) Inhalation daily      FAMILY HISTORY:  Family history of heart attack  Family history of pulmonary embolism: father @ 49 yr old  FH: CAD (coronary artery disease) (Sibling)      SOCIAL HISTORY:  Smoking:  no  ETOH: no    Drug Use: no         T(F): 97.4 (04-23-18 @ 04:05), Max: 97.5 (04-22-18 @ 21:07)  HR: 67 (04-23-18 @ 04:05)  BP: 103/64 (04-23-18 @ 04:05)  RR: 18 (04-23-18 @ 04:05)  SpO2: 96% (04-23-18 @ 04:05)  Wt(kg): --    PHYSICAL EXAM:  General: alert, no acute distress  Eyes:  anicteric, no conjunctival injection, no discharge  Oropharynx: no lesions or injection 	  Neck: supple, without adenopathy  Lungs: clear to auscultation  Heart: regular rate and rhythm; no murmur, rubs or gallops  Abdomen: soft, nondistended, nontender, without mass or organomegaly  Skin: no lesions  Extremities: no clubbing, cyanosis, or edema  Neurologic: alert, oriented, moves all extremities    LAB RESULTS:                        9.1    4.41  )-----------( 202      ( 23 Apr 2018 07:30 )             27.2     04-23    134<L>  |  102  |  22  ----------------------------<  131<H>  3.9   |  25  |  0.67    Ca    9.3      23 Apr 2018 06:51  Phos  2.6     04-23  Mg     1.9     04-23    TPro  6.2  /  Alb  2.4<L>  /  TBili  0.2  /  DBili  x   /  AST  32  /  ALT  37  /  AlkPhos  238<H>  04-22    LIVER FUNCTIONS - ( 22 Apr 2018 05:13 )  Alb: x     / Pro: x     / ALK PHOS: x     / ALT: x     / AST: x     / GGT: 218 U/L           MICROBIOLOGY:  RECENT CULTURES:  04-21 @ 06:37 .Urine Clean Catch (Midstream)     10,000 - 49,000 CFU/mL Enterococcus faecalis  <10,000 CFU/ml Normal Urogenital robel present      04-21 @ 06:24 .Blood Blood     No growth to date.            RADIOLOGY REVIEWED:  < from: VA Duplex Lower Ext Vein Scan, Bilat (04.22.18 @ 13:42) >  IMPRESSION:     No evidence of bilateral lower extremity deep venous thrombosis.    < from: Xray Chest 1 View- PORTABLE-Urgent (04.21.18 @ 03:42) >  IMPRESSION:  Right pleural effusion and adjacentpassive atelectasis is unchanged from   4/18/2018.    < end of copied text >  < from: US Abdomen Complete (04.19.18 @ 15:00) >  IMPRESSION:     Echogenickidneys, in keeping with parenchymal renal disease.   No biliary ductal dilatation. Normal ultrasound of the gallbladder.

## 2018-04-23 NOTE — CONSULT NOTE ADULT - PROBLEM SELECTOR RECOMMENDATION 9
Continue Nasal Sprays ( nasal Saline 2gtts 4x/day B/L)  Bacitracin Ointment B/L 2x/day  Avoid nasal trauma/straining, cough & sneeze with mouth open-d/w pt in detail  Med Team Care

## 2018-04-23 NOTE — DISCHARGE NOTE ADULT - CARE PROVIDERS DIRECT ADDRESSES
,[email or web address]

## 2018-04-23 NOTE — PROGRESS NOTE ADULT - ASSESSMENT
pt  with  h/o htn, cad, stents, br  asthma, seizure, cva , ca esophagus,  c/c  cachexia, RA  on prednisone   s./p  recent cath,   with rico, to  LAD and angioplasty, D1, ON 4/17,  an d since then, had  low  bp,, and  was  on midodrine, on last admission  h/o  anemia ,   on c/c . prednisone,, for  RA,    low  bp,  stress dose  hydrocortisone,   does  not  appear to have  sepsis,   follow  blood/ urine  c/c. ID called  c.c anemia  Pt  is dnr now,    echo  with modertae AS. / ?  severe   asa/ plavix   given recent pc  ent eval and endo  dr lida cruz, for  low  bp,  and  on  c/c  prednisone at home

## 2018-04-23 NOTE — CONSULT NOTE ADULT - SUBJECTIVE AND OBJECTIVE BOX
HPI:  82 y/o M former smoker w/ hx of HTN, TIA, Seizures, RA, CAD s/p 6 stents most recently 4/17/18, Esophageal CA s/p chemo/RT and esophagogastrectomy recent admission for cardiac cath now presenting for altered mental status at home. As per patient and family he was discharged 1 day prior to admission and was doing well until later in the day when his wife noticed he became more lethargic and had difficulty answering questions. He also noticed that he was having substernal left sided chest pressure radiating down his left arm which lasted for a few minutes. HE was concerned given the recent stent that this was another cardiac event and decided to come into the hospital. He denied any fevers/chills,nausea/vomiting,palpiotations,sweating,abdominal pain,diarrhea,melena. PT was recently hypotensive during previous admission and was started on hydrocortisone. PT was trnasitioned from hydrocoritosne to prednisone 20mg     In the ED VS T:92.7 BP:98/50 P:80 RR:20 O2:100% on 2L. PT was given vanc 1g, 1LNS bolus, aztroenam 1g and hydrocortisone 100mg IV (21 Apr 2018 10:47)      Admit Diagnosis  Chronic adrenal insufficiency      ENDOCRINE HPI: 82 y/o Esophageal Ca. CAD recent admission with stent placement admitted with fatigue, hypotension, hypoglycemia, and hypothermia. Patient on chronic steroids for RA.    Patient says that in the past year he lost 45 lbs. following a diagnosis of esophageal Ca, adjuvant chemo/ RT and surgery 8/17. He had multiple stents placed during this time, recently 4/17. After discharge he was compliant with prednisone 5 mg daily, no fever, no chills, but noted worsening fatigue at night. He was admitted with complaints of chest pain, however on admission he was noted with hypothermia, hypotension and hypoglycemia. All resolved with stress dose steroids. He was started on epimeric antibiotics, but no clear source of infection noted.   Symptoms on admission resolved with steroids, except low BP- patient is off his BP meds, started on midodrine.  Still on high dose hydrocortisone.      PAST MEDICAL & SURGICAL HISTORY:  Coronary artery disease: s/p 3 stents on June 30, 2017 at [place] Dr. 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  History of tonsillectomy  H/O heart artery stent: June 30, 2017  Knee arthropathy: left  History of total hip replacement: left  History of hernia repair      FAMILY HISTORY:  Family history of heart attack  Family history of pulmonary embolism: father @ 49 yr old  FH: CAD (coronary artery disease) (Sibling)      Social History:    Outpatient Medications:    MEDICATIONS  (STANDING):  ALPRAZolam 0.5 milliGRAM(s) Oral two times a day  aspirin enteric coated 81 milliGRAM(s) Oral daily  BACItracin   Ointment 1 Application(s) Topical two times a day  buDESOnide  80 MICROgram(s)/formoterol 4.5 MICROgram(s) Inhaler 2 Puff(s) Inhalation two times a day  clopidogrel Tablet 75 milliGRAM(s) Oral daily  cyanocobalamin 500 MICROGram(s) Oral daily  finasteride 5 milliGRAM(s) Oral daily  folic acid 1 milliGRAM(s) Oral daily  heparin  Injectable 5000 Unit(s) SubCutaneous every 8 hours  hydrocortisone sodium succinate Injectable 100 milliGRAM(s) IV Push every 8 hours  midodrine 5 milliGRAM(s) Oral every 8 hours  montelukast 10 milliGRAM(s) Oral daily  phenytoin   Capsule 200 milliGRAM(s) Oral two times a day  pyridoxine 100 milliGRAM(s) Oral daily  sodium chloride 0.65% Nasal 1 Spray(s) Both Nostrils two times a day  tiotropium 18 MICROgram(s) Capsule 1 Capsule(s) Inhalation daily    MEDICATIONS  (PRN):  acetaminophen   Tablet 650 milliGRAM(s) Oral every 6 hours PRN For Temp greater than 38 C (100.4 F)  ALBUTerol/ipratropium for Nebulization 3 milliLiter(s) Nebulizer every 6 hours PRN Shortness of Breath and/or Wheezing      Allergies    penicillin (Rash)    Intolerances        Review of Systems:  Constitutional: No fever, no chills, weight loss 45 lbs last year, now stable, dizziness.  Eyes: No blurry vision, no headackes  Neuro: No tremors  HEENT: No pain  Cardiovascular: + chest pain, no palpitations  Respiratory: No SOB, + cough with bloody sputum.  GI: + nausea, vomiting, no abdominal pain  : No dysuria  Skin: no rash  Psych: no depression  Endocrine: no polyuria, polydipsia  Hem/lymph: no swelling  Osteoporosis: no fractures    ALL OTHER SYSTEMS REVIEWED AND NEGATIVE      PHYSICAL EXAM:  VITALS: T(C): 36.7 (04-23-18 @ 13:45)  T(F): 98 (04-23-18 @ 13:45), Max: 98 (04-23-18 @ 13:45)  HR: 71 (04-23-18 @ 13:45) (66 - 71)  BP: 90/55 (04-23-18 @ 13:45) (90/55 - 103/64)  RR:  (16 - 19)  SpO2:  (96% - 99%)  Wt(lbs): --128  GENERAL: NAD, well-groomed  EYES: No proptosis, no lid lag, anicteric  HEENT:  Atraumatic, Normocephalic, moist mucous membranes  THYROID: Normal size, no palpable nodules  RESPIRATORY: Clear to auscultation bilaterally; No rales, rhonchi, wheezing  CARDIOVASCULAR: Regular rate and rhythm; No murmurs; no peripheral edema  GI: Soft, nontender, non distended, normal bowel sounds  SKIN: Dry, intact, No rashes or lesions  MUSCULOSKELETAL: Full range of motion, decreased strength  NEURO: sensation intact, extraocular movements intact, no tremor  PSYCH: Alert and oriented x 3, normal affect, normal mood    POCT Blood Glucose.: 73 mg/dL (04-22-18 @ 17:21)  POCT Blood Glucose.: 139 mg/dL (04-22-18 @ 12:26)  POCT Blood Glucose.: 144 mg/dL (04-21-18 @ 17:09)  POCT Blood Glucose.: 97 mg/dL (04-21-18 @ 10:58)  POCT Blood Glucose.: 89 mg/dL (04-21-18 @ 09:30)  POCT Blood Glucose.: 117 mg/dL (04-21-18 @ 06:30)  POCT Blood Glucose.: 138 mg/dL (04-21-18 @ 05:31)  POCT Blood Glucose.: 230 mg/dL (04-21-18 @ 04:24)  POCT Blood Glucose.: 56 mg/dL (04-21-18 @ 03:24)                            9.1    4.41  )-----------( 202      ( 23 Apr 2018 07:30 )             27.2       04-23    134<L>  |  102  |  22  ----------------------------<  131<H>  3.9   |  25  |  0.67    Ca    9.3      23 Apr 2018 06:51  Phos  2.6     04-23  Mg     1.9     04-23    TPro  6.2  /  Alb  2.4<L>  /  TBili  0.2  /  DBili  x   /  AST  32  /  ALT  37  /  AlkPhos  238<H>  04-22      Thyroid Function Tests:  04-21 @ 07:04 TSH 10.24 FreeT4 -- T3 -- Anti TPO -- Anti Thyroglobulin Ab -- TSI --  T4, Serum: 3.5 ug/dL (04.22.18 @ 05:13)  Free Triiodothyronine, Serum: 1.01: Test Repeated pg/ml (04.22.18 @ 05:13)                  Radiology:

## 2018-04-23 NOTE — DISCHARGE NOTE ADULT - NS AS DC STROKE ED MATERIALS
Risk Factors for Stroke/Stroke Warning Signs and Symptoms/Call 911 for Stroke/Prescribed Medications/Need for Followup After Discharge/Stroke Education Booklet

## 2018-04-24 DIAGNOSIS — I95.9 HYPOTENSION, UNSPECIFIED: ICD-10-CM

## 2018-04-24 LAB
-  AMPICILLIN: SIGNIFICANT CHANGE UP
-  CIPROFLOXACIN: SIGNIFICANT CHANGE UP
-  NITROFURANTOIN: SIGNIFICANT CHANGE UP
-  TETRACYCLINE: SIGNIFICANT CHANGE UP
-  VANCOMYCIN: SIGNIFICANT CHANGE UP
ANION GAP SERPL CALC-SCNC: 7 MMOL/L — SIGNIFICANT CHANGE UP (ref 5–17)
BUN SERPL-MCNC: 23 MG/DL — SIGNIFICANT CHANGE UP (ref 7–23)
CALCIUM SERPL-MCNC: 9 MG/DL — SIGNIFICANT CHANGE UP (ref 8.4–10.5)
CHLORIDE SERPL-SCNC: 103 MMOL/L — SIGNIFICANT CHANGE UP (ref 96–108)
CO2 SERPL-SCNC: 26 MMOL/L — SIGNIFICANT CHANGE UP (ref 22–31)
CREAT SERPL-MCNC: 0.68 MG/DL — SIGNIFICANT CHANGE UP (ref 0.5–1.3)
CULTURE RESULTS: SIGNIFICANT CHANGE UP
FT4I SERPL CALC-MCNC: 1.3 FTI% — LOW (ref 1.4–4.8)
FT4I SERPL CALC-MCNC: 4.3 INDEX — LOW (ref 4.6–15.4)
GLUCOSE BLDC GLUCOMTR-MCNC: 84 MG/DL — SIGNIFICANT CHANGE UP (ref 70–99)
GLUCOSE SERPL-MCNC: 101 MG/DL — HIGH (ref 70–99)
HCT VFR BLD CALC: 27.4 % — LOW (ref 39–50)
HGB BLD-MCNC: 9 G/DL — LOW (ref 13–17)
MCHC RBC-ENTMCNC: 30.2 PG — SIGNIFICANT CHANGE UP (ref 27–34)
MCHC RBC-ENTMCNC: 32.8 GM/DL — SIGNIFICANT CHANGE UP (ref 32–36)
MCV RBC AUTO: 91.9 FL — SIGNIFICANT CHANGE UP (ref 80–100)
METHOD TYPE: SIGNIFICANT CHANGE UP
ORGANISM # SPEC MICROSCOPIC CNT: SIGNIFICANT CHANGE UP
ORGANISM # SPEC MICROSCOPIC CNT: SIGNIFICANT CHANGE UP
PLATELET # BLD AUTO: 198 K/UL — SIGNIFICANT CHANGE UP (ref 150–400)
POTASSIUM SERPL-MCNC: 3.6 MMOL/L — SIGNIFICANT CHANGE UP (ref 3.5–5.3)
POTASSIUM SERPL-SCNC: 3.6 MMOL/L — SIGNIFICANT CHANGE UP (ref 3.5–5.3)
PROLACTIN SERPL-MCNC: 5 NG/ML — SIGNIFICANT CHANGE UP (ref 4.1–18.4)
RBC # BLD: 2.98 M/UL — LOW (ref 4.2–5.8)
RBC # FLD: 17.9 % — HIGH (ref 10.3–14.5)
SODIUM SERPL-SCNC: 136 MMOL/L — SIGNIFICANT CHANGE UP (ref 135–145)
SPECIMEN SOURCE: SIGNIFICANT CHANGE UP
T3 SERPL-MCNC: 44 NG/DL — LOW (ref 80–200)
T3/T3 UPTAKE INDEX SERPL-RTO: 44 % — HIGH (ref 28–41)
T4 AB SER-ACNC: 3 UG/DL — LOW (ref 4.6–12)
T4 FREE SERPL-MCNC: 0.6 NG/DL — LOW (ref 0.9–1.8)
T4/T3 UPTAKE INDEX SERPL: 0.7 INDEX — LOW (ref 0.8–1.3)
TSH SERPL-MCNC: 7.8 UIU/ML — HIGH (ref 0.27–4.2)
WBC # BLD: 6.26 K/UL — SIGNIFICANT CHANGE UP (ref 3.8–10.5)
WBC # FLD AUTO: 6.26 K/UL — SIGNIFICANT CHANGE UP (ref 3.8–10.5)

## 2018-04-24 RX ORDER — SODIUM CHLORIDE 9 MG/ML
250 INJECTION INTRAMUSCULAR; INTRAVENOUS; SUBCUTANEOUS ONCE
Qty: 0 | Refills: 0 | Status: COMPLETED | OUTPATIENT
Start: 2018-04-24 | End: 2018-04-24

## 2018-04-24 RX ORDER — HYDROCORTISONE 20 MG
50 TABLET ORAL EVERY 8 HOURS
Qty: 0 | Refills: 0 | Status: DISCONTINUED | OUTPATIENT
Start: 2018-04-24 | End: 2018-04-25

## 2018-04-24 RX ADMIN — BUDESONIDE AND FORMOTEROL FUMARATE DIHYDRATE 2 PUFF(S): 160; 4.5 AEROSOL RESPIRATORY (INHALATION) at 17:38

## 2018-04-24 RX ADMIN — Medication 1 APPLICATION(S): at 17:39

## 2018-04-24 RX ADMIN — MONTELUKAST 10 MILLIGRAM(S): 4 TABLET, CHEWABLE ORAL at 11:16

## 2018-04-24 RX ADMIN — MIDODRINE HYDROCHLORIDE 5 MILLIGRAM(S): 2.5 TABLET ORAL at 21:49

## 2018-04-24 RX ADMIN — TIOTROPIUM BROMIDE 1 CAPSULE(S): 18 CAPSULE ORAL; RESPIRATORY (INHALATION) at 14:34

## 2018-04-24 RX ADMIN — HEPARIN SODIUM 5000 UNIT(S): 5000 INJECTION INTRAVENOUS; SUBCUTANEOUS at 14:27

## 2018-04-24 RX ADMIN — CLOPIDOGREL BISULFATE 75 MILLIGRAM(S): 75 TABLET, FILM COATED ORAL at 11:15

## 2018-04-24 RX ADMIN — Medication 0.5 MILLIGRAM(S): at 06:22

## 2018-04-24 RX ADMIN — BUDESONIDE AND FORMOTEROL FUMARATE DIHYDRATE 2 PUFF(S): 160; 4.5 AEROSOL RESPIRATORY (INHALATION) at 06:21

## 2018-04-24 RX ADMIN — Medication 1 MILLIGRAM(S): at 11:16

## 2018-04-24 RX ADMIN — Medication 1 SPRAY(S): at 06:22

## 2018-04-24 RX ADMIN — Medication 1 APPLICATION(S): at 06:22

## 2018-04-24 RX ADMIN — SODIUM CHLORIDE 500 MILLILITER(S): 9 INJECTION INTRAMUSCULAR; INTRAVENOUS; SUBCUTANEOUS at 14:27

## 2018-04-24 RX ADMIN — Medication 1 SPRAY(S): at 17:39

## 2018-04-24 RX ADMIN — Medication 20 MILLIGRAM(S): at 06:22

## 2018-04-24 RX ADMIN — HEPARIN SODIUM 5000 UNIT(S): 5000 INJECTION INTRAVENOUS; SUBCUTANEOUS at 21:49

## 2018-04-24 RX ADMIN — MIDODRINE HYDROCHLORIDE 5 MILLIGRAM(S): 2.5 TABLET ORAL at 06:21

## 2018-04-24 RX ADMIN — Medication 81 MILLIGRAM(S): at 11:15

## 2018-04-24 RX ADMIN — Medication 50 MILLIGRAM(S): at 18:30

## 2018-04-24 RX ADMIN — Medication 200 MILLIGRAM(S): at 06:21

## 2018-04-24 RX ADMIN — Medication 200 MILLIGRAM(S): at 17:35

## 2018-04-24 RX ADMIN — Medication 100 MILLIGRAM(S): at 11:16

## 2018-04-24 RX ADMIN — PREGABALIN 500 MICROGRAM(S): 225 CAPSULE ORAL at 11:16

## 2018-04-24 RX ADMIN — HEPARIN SODIUM 5000 UNIT(S): 5000 INJECTION INTRAVENOUS; SUBCUTANEOUS at 06:21

## 2018-04-24 RX ADMIN — Medication 0.5 MILLIGRAM(S): at 17:35

## 2018-04-24 RX ADMIN — FINASTERIDE 5 MILLIGRAM(S): 5 TABLET, FILM COATED ORAL at 11:15

## 2018-04-24 RX ADMIN — MIDODRINE HYDROCHLORIDE 5 MILLIGRAM(S): 2.5 TABLET ORAL at 14:25

## 2018-04-24 NOTE — PROGRESS NOTE ADULT - SUBJECTIVE AND OBJECTIVE BOX
- Patient seen and examined.  - In summary, patient is a 82 year old man who presented with weakness and fatigue. (2018 11:11)         *****MEDICATIONS:    MEDICATIONS  (STANDING):  ALPRAZolam 0.5 milliGRAM(s) Oral two times a day  aspirin enteric coated 81 milliGRAM(s) Oral daily  BACItracin   Ointment 1 Application(s) Topical two times a day  buDESOnide  80 MICROgram(s)/formoterol 4.5 MICROgram(s) Inhaler 2 Puff(s) Inhalation two times a day  clopidogrel Tablet 75 milliGRAM(s) Oral daily  cyanocobalamin 500 MICROGram(s) Oral daily  finasteride 5 milliGRAM(s) Oral daily  folic acid 1 milliGRAM(s) Oral daily  heparin  Injectable 5000 Unit(s) SubCutaneous every 8 hours  midodrine 5 milliGRAM(s) Oral every 8 hours  montelukast 10 milliGRAM(s) Oral daily  phenytoin   Capsule 200 milliGRAM(s) Oral two times a day  predniSONE   Tablet 20 milliGRAM(s) Oral daily  pyridoxine 100 milliGRAM(s) Oral daily  sodium chloride 0.65% Nasal 1 Spray(s) Both Nostrils two times a day  tiotropium 18 MICROgram(s) Capsule 1 Capsule(s) Inhalation daily    MEDICATIONS  (PRN):  acetaminophen   Tablet 650 milliGRAM(s) Oral every 6 hours PRN For Temp greater than 38 C (100.4 F)  ALBUTerol/ipratropium for Nebulization 3 milliLiter(s) Nebulizer every 6 hours PRN Shortness of Breath and/or Wheezing             ***** REVIEW OF SYSTEM:  GEN: no fever, no chills, no pain  RESP: no SOB, no cough, no sputum  CVS: no chest pain, no palpitations, no edema  GI: no abdominal pain, no nausea, no vomiting, no constipation, no diarrhea  : no dysuria, no frequency  NEURO: no headache, no dizziness  PSYCH: no depression, not anxious  Derm : no itching, no rash         ***** VITAL SIGNS:    T(F): 97.2 (18 @ 04:40), Max: 98 (18 @ 13:45)  HR: 66 (18 @ 04:40) (66 - 71)  BP: 108/63 (18 @ 04:40) (90/55 - 111/63)  RR: 17 (18 @ 04:40) (16 - 18)  SpO2: 97% (18 @ 04:40) (97% - 99%)  Wt(kg): --  ,   I&O's Summary    2018 07:01  -  2018 07:00  --------------------------------------------------------  IN: 840 mL / OUT: 800 mL / NET: 40 mL                   *****PHYSICAL EXAM:  GEN: A&O X 3 , NAD , comfortable  HEENT: NCAT, EOMI, MMM, no icterus  NECK: Supple, No JVD  CVS: S1S2 , regular , No M/R/G appreciated  PULM: CTA B/L,  no W/R/R appreciated  ABD.: soft. non tender, non distended,  bowel sounds present  Extrem: intact pulses , no edema noted  Derm: No rash or ecchymosis noted  PSYCH: normal mood, no depression, not anxious         *****LAB AND IMAGIN.0    6.26  )-----------( 198      ( 2018 07:57 )             27.4                   136  |  103  |  23  ----------------------------<  101<H>  3.6   |  26  |  0.68    Ca    9.0      2018 06:31  Phos  2.6       Mg     1.9             [All pertinent recent Imaging/Reports reviewed]  < from: Transthoracic Echocardiogram (18 @ 14:23) >  Conclusions:  1. Mitral annular calcification, otherwise normal mitral  valve. Mild mitral regurgitation. Mean transmitral valve  gradient equals 4 mm Hg, consistent with mild mitral  stenosis. (HRabout 60s bpm)  2. Calcified trileaflet aortic valve with decreased  opening. Peak transaortic valve gradient equals 34 mm Hg,  mean transaortic valve gradient equals 19 mm Hg, estimated  aortic valve area equals 1.3 sqcm (by continuity equation),  aortic valve velocity time integral equals 59 cm,  consistent with moderate aortic stenosis. Aortic valve  assessment is limited by the presence of frequent PVCs and  the degree of aortic stenosis may be underestimated.  Visually, the aortic valve appears moderate-severely  stenotic. Consider additional imaging of the aortic valve  such as with additional TTE or TINO imaging if clinically  indicated.No aortic valve regurgitation seen.  3. Severely dilated left atrium.  LA volume index = 64  cc/m2.  4. Endocardium not well visualized; grossly normal left  ventricular systolic function. Septal motion consistent  with conduction defect. There is a false tendon in the LV  cavity (normal variant).  5. The right ventricle is not well visualized; right  ventricle appears enlarged with normal systolic function.  *** Compared with echocardiogram of 2017, results are  similar on today's study. Aortic valve gradients are higher  on this study.    < end of copied text >         *****A S S E S S M E N T   A N D   P L A N :  82M with HTN, Asthma, CVA, seizures, CAD who presents with mild chest discomfort, fatigue, diaphoresis, hypothermia, hypoglycemia, hypotension  abx DC per ID  keep O=I  DAPT s/p recent PCI  BB if/when BP allows  echo noted  endo, ENT appreciated    __________________________  A. TRE Brady.

## 2018-04-24 NOTE — PROGRESS NOTE ADULT - ASSESSMENT
pt  with  h/o htn, cad, stents, br  asthma, seizure, cva , ca esophagus,  c/c  cachexia, RA  on prednisone   s./p  recent cath,   with rico, to  LAD and angioplasty, D1, ON 4/17,  an d since then, had  low  bp,, and  was  on midodrine, on last admission  h/o  anemia ,   on c/c . prednisone,, for  RA,    low  bp,  stress dose  hydrocortisone,   does  not  appear to have  sepsis,    ID called  c.c anemia  Pt  is dnr now,    echo  with modertae AS. / ?  severe   asa/ plavix   given recent pci  ent eval and endo  dr lida cruz, for  low  bp,  and  on  c/c  prednisone at home, noted  on prednisone  20 , qd  ok to  advance diet, no artificial  feeding per  family

## 2018-04-24 NOTE — SWALLOW BEDSIDE ASSESSMENT ADULT - SPECIFY REASON(S)
Of note, swallowing evaluation was completed prior to MD cancelling exam; SLP contacted BAKARI Floyd who could not reenter order as MD had cancelled it.  SLP attempted to contact MD via tel. unsuccesfully.

## 2018-04-24 NOTE — PROGRESS NOTE ADULT - SUBJECTIVE AND OBJECTIVE BOX
Chief Complaint: 84 y/o Esophageal Ca. CAD recent admission with stent placement admitted with fatigue, hypotension, hypoglycemia, and hypothermia. Patient on chronic steroids for RA.    Patient changed from IV hydrocortisone to PO prednisone 20 mg today. BP lower set.  No n/v, evaluated by S&S tolerating food well, on soft diet.  Patient not adding salt to foods..    MEDICATIONS  (STANDING):  ALPRAZolam 0.5 milliGRAM(s) Oral two times a day  aspirin enteric coated 81 milliGRAM(s) Oral daily  BACItracin   Ointment 1 Application(s) Topical two times a day  buDESOnide  80 MICROgram(s)/formoterol 4.5 MICROgram(s) Inhaler 2 Puff(s) Inhalation two times a day  clopidogrel Tablet 75 milliGRAM(s) Oral daily  cyanocobalamin 500 MICROGram(s) Oral daily  finasteride 5 milliGRAM(s) Oral daily  folic acid 1 milliGRAM(s) Oral daily  heparin  Injectable 5000 Unit(s) SubCutaneous every 8 hours  hydrocortisone sodium succinate Injectable 50 milliGRAM(s) IV Push every 8 hours  midodrine 5 milliGRAM(s) Oral every 8 hours  montelukast 10 milliGRAM(s) Oral daily  phenytoin   Capsule 200 milliGRAM(s) Oral two times a day  pyridoxine 100 milliGRAM(s) Oral daily  sodium chloride 0.65% Nasal 1 Spray(s) Both Nostrils two times a day  tiotropium 18 MICROgram(s) Capsule 1 Capsule(s) Inhalation daily    MEDICATIONS  (PRN):  acetaminophen   Tablet 650 milliGRAM(s) Oral every 6 hours PRN For Temp greater than 38 C (100.4 F)  ALBUTerol/ipratropium for Nebulization 3 milliLiter(s) Nebulizer every 6 hours PRN Shortness of Breath and/or Wheezing      Allergies    penicillin (Rash)    Intolerances        PHYSICAL EXAM:  VITALS: T(C): 36.4 (04-24-18 @ 16:44)  T(F): 97.6 (04-24-18 @ 16:44), Max: 97.6 (04-24-18 @ 16:44)  HR: 76 (04-24-18 @ 16:44) (66 - 76)  BP: 107/61 (04-24-18 @ 16:44) (88/46 - 111/63)  RR:  (17 - 18)  SpO2:  (94% - 97%)  GENERAL: NAD, well-groomed  EYES: No proptosis, no lid lag, anicteric  HEENT:  Atraumatic, Normocephalic, moist mucous membranes  THYROID: Normal size, no palpable nodules  RESPIRATORY: Clear to auscultation bilaterally; No rales, rhonchi, wheezing  CARDIOVASCULAR: Regular rate and rhythm; No murmurs; +1 edema left leg.  GI: Soft, nontender, non distended, normal bowel sounds  SKIN: Dry, intact, No rashes or lesions  MUSCULOSKELETAL: Full range of motion, decreased strength  NEURO: sensation intact, extraocular movements intact, no tremor  PSYCH: Alert and oriented x 3, normal affect, normal mood        POCT Blood Glucose.: 84 mg/dL (04-24-18 @ 16:52)  POCT Blood Glucose.: 73 mg/dL (04-22-18 @ 17:21)  POCT Blood Glucose.: 139 mg/dL (04-22-18 @ 12:26)                            9.0    6.26  )-----------( 198      ( 24 Apr 2018 07:57 )             27.4       04-24    136  |  103  |  23  ----------------------------<  101<H>  3.6   |  26  |  0.68    EGFR if : 103  EGFR if non : 89    Ca    9.0      04-24  Mg     1.9     04-23  Phos  2.6     04-23    TPro  6.2  /  Alb  2.4<L>  /  TBili  0.2  /  DBili  x   /  AST  32  /  ALT  37  /  AlkPhos  238<H>  04-22      Thyroid Function Tests:  04-24 @ 07:51 TSH 7.80 FreeT4 0.6 T3 44 Anti TPO -- Anti Thyroglobulin Ab -- TSI --  04-21 @ 07:04 TSH 10.24 FreeT4 -- T3 -- Anti TPO -- Anti Thyroglobulin Ab -- TSI --  Thyroid Stimulating Hormone, Serum: 7.80 uIU/mL (04.24.18 @ 07:51)  Free Thyroxin Index: 4.3 INDEX (04.24.18 @ 07:51)  T4, Serum (04.24.18 @ 07:51)  T4, Serum: 3.0 ug/dL  T3 Uptake %: 44 % (04.24.18 @ 07:51)  Triiodothyronine, Total (T3 Total): 44 ng/dL (04.24.18 @ 07:51)    Prolactin, Serum: 5.0 ng/mL (04.24.18 @ 07:51)

## 2018-04-24 NOTE — PROGRESS NOTE ADULT - SUBJECTIVE AND OBJECTIVE BOX
no  complaints  was  sitting in a chair, not dizzy per  pt    REVIEW OF SYSTEMS:  CONSTITUTIONAL: No weakness,  no   fevers      RESPIRATORY:  No    shortness of breath  CARDIOVASCULAR: No chest pain,   GASTROINTESTINAL: No abdominal  pain  NEUROLOGICAL: No  focal  weakness    MEDICATIONS  (STANDING):  ALPRAZolam 0.5 milliGRAM(s) Oral two times a day  aspirin enteric coated 81 milliGRAM(s) Oral daily  BACItracin   Ointment 1 Application(s) Topical two times a day  buDESOnide  80 MICROgram(s)/formoterol 4.5 MICROgram(s) Inhaler 2 Puff(s) Inhalation two times a day  clopidogrel Tablet 75 milliGRAM(s) Oral daily  cyanocobalamin 500 MICROGram(s) Oral daily  finasteride 5 milliGRAM(s) Oral daily  folic acid 1 milliGRAM(s) Oral daily  heparin  Injectable 5000 Unit(s) SubCutaneous every 8 hours  midodrine 5 milliGRAM(s) Oral every 8 hours  montelukast 10 milliGRAM(s) Oral daily  phenytoin   Capsule 200 milliGRAM(s) Oral two times a day  predniSONE   Tablet 20 milliGRAM(s) Oral daily  pyridoxine 100 milliGRAM(s) Oral daily  sodium chloride 0.65% Nasal 1 Spray(s) Both Nostrils two times a day  tiotropium 18 MICROgram(s) Capsule 1 Capsule(s) Inhalation daily    MEDICATIONS  (PRN):  acetaminophen   Tablet 650 milliGRAM(s) Oral every 6 hours PRN For Temp greater than 38 C (100.4 F)  ALBUTerol/ipratropium for Nebulization 3 milliLiter(s) Nebulizer every 6 hours PRN Shortness of Breath and/or Wheezing      Vital Signs Last 24 Hrs  T(C): 36.2 (24 Apr 2018 04:40), Max: 36.7 (23 Apr 2018 13:45)  T(F): 97.2 (24 Apr 2018 04:40), Max: 98 (23 Apr 2018 13:45)  HR: 66 (24 Apr 2018 04:40) (66 - 71)  BP: 108/63 (24 Apr 2018 04:40) (90/55 - 111/63)  BP(mean): --  RR: 17 (24 Apr 2018 04:40) (16 - 18)  SpO2: 97% (24 Apr 2018 04:40) (97% - 99%)  CAPILLARY BLOOD GLUCOSE        I&O's Summary    23 Apr 2018 07:01  -  24 Apr 2018 07:00  --------------------------------------------------------  IN: 840 mL / OUT: 800 mL / NET: 40 mL        PHYSICAL EXAM:  HEAD:  Atraumatic, Normocephalic  NECK: Supple, No JVD  CHEST/LUNG:   no     rales,     no,    rhonchi  HEART: Regular rate and rhythm;         murmur  ABDOMEN: Soft, Nontender, ;   EXTREMITIES:  less      edema  NEUROLOGY:  alert    LABS:                        9.0    6.26  )-----------( 198      ( 24 Apr 2018 07:57 )             27.4     04-24    136  |  103  |  23  ----------------------------<  101<H>  3.6   |  26  |  0.68    Ca    9.0      24 Apr 2018 06:31  Phos  2.6     04-23  Mg     1.9     04-23                      Thyroid Stimulating Hormone, Serum: 7.80 uIU/mL (04-24 @ 07:51)          Consultant(s) Notes Reviewed:      Care Discussed with Consultants/Other Providers:

## 2018-04-24 NOTE — CONSULT NOTE ADULT - SUBJECTIVE AND OBJECTIVE BOX
MEJIA LATIF:[id number],   82yMale followed for:  penicillin (Rash)    PAST MEDICAL & SURGICAL HISTORY:  Coronary artery disease: s/p 3 stents on June 30, 2017 at [place] Dr. Lavelle Reid  OA (osteoarthritis) of knee: right  Former smoker, stopped smoking in distant past  Rheumatoid arthritis  Seizure disorder: 1 episode approximately 15 yrs ago  Asthma  Hyperlipidemia  BPH (benign prostatic hyperplasia)  HTN (hypertension)  Esophagus cancer  Chronic allergic rhinitis  BCC (basal cell carcinoma): skin  Cerebrovascular accident (CVA)  Anxiety  Anastomotic leak following esophagectomy: [facility] logan esophagectomy  History of tonsillectomy  H/O heart artery stent: June 30, 2017  Knee arthropathy: left  History of total hip replacement: left  History of hernia repair    FAMILY HISTORY:  Family history of heart attack  Family history of pulmonary embolism: father @ 49 yr old  FH: CAD (coronary artery disease) (Sibling)    MEDICATIONS  (STANDING):  ALPRAZolam 0.5 milliGRAM(s) Oral two times a day  aspirin enteric coated 81 milliGRAM(s) Oral daily  BACItracin   Ointment 1 Application(s) Topical two times a day  buDESOnide  80 MICROgram(s)/formoterol 4.5 MICROgram(s) Inhaler 2 Puff(s) Inhalation two times a day  clopidogrel Tablet 75 milliGRAM(s) Oral daily  cyanocobalamin 500 MICROGram(s) Oral daily  finasteride 5 milliGRAM(s) Oral daily  folic acid 1 milliGRAM(s) Oral daily  heparin  Injectable 5000 Unit(s) SubCutaneous every 8 hours  hydrocortisone sodium succinate Injectable 50 milliGRAM(s) IV Push every 8 hours  midodrine 5 milliGRAM(s) Oral every 8 hours  montelukast 10 milliGRAM(s) Oral daily  phenytoin   Capsule 200 milliGRAM(s) Oral two times a day  pyridoxine 100 milliGRAM(s) Oral daily  sodium chloride 0.65% Nasal 1 Spray(s) Both Nostrils two times a day  tiotropium 18 MICROgram(s) Capsule 1 Capsule(s) Inhalation daily    MEDICATIONS  (PRN):  acetaminophen   Tablet 650 milliGRAM(s) Oral every 6 hours PRN For Temp greater than 38 C (100.4 F)  ALBUTerol/ipratropium for Nebulization 3 milliLiter(s) Nebulizer every 6 hours PRN Shortness of Breath and/or Wheezing      Vital Signs Last 24 Hrs  T(C): 36.4 (24 Apr 2018 16:44), Max: 36.4 (24 Apr 2018 16:44)  T(F): 97.6 (24 Apr 2018 16:44), Max: 97.6 (24 Apr 2018 16:44)  HR: 76 (24 Apr 2018 16:44) (66 - 76)  BP: 107/61 (24 Apr 2018 16:44) (88/46 - 111/63)  BP(mean): --  RR: 18 (24 Apr 2018 16:44) (17 - 18)  SpO2: 97% (24 Apr 2018 16:44) (94% - 97%)  nc/at  s1s2  cta  soft, nt, nd no guarding or rebound  no c/c/e    CBC Full  -  ( 24 Apr 2018 07:57 )  WBC Count : 6.26 K/uL  Hemoglobin : 9.0 g/dL  Hematocrit : 27.4 %  Platelet Count - Automated : 198 K/uL  Mean Cell Volume : 91.9 fl  Mean Cell Hemoglobin : 30.2 pg  Mean Cell Hemoglobin Concentration : 32.8 gm/dL  Auto Neutrophil # : x  Auto Lymphocyte # : x  Auto Monocyte # : x  Auto Eosinophil # : x  Auto Basophil # : x  Auto Neutrophil % : x  Auto Lymphocyte % : x  Auto Monocyte % : x  Auto Eosinophil % : x  Auto Basophil % : x    04-24    136  |  103  |  23  ----------------------------<  101<H>  3.6   |  26  |  0.68    Ca    9.0      24 Apr 2018 06:31  Phos  2.6     04-23  Mg     1.9     04-23

## 2018-04-24 NOTE — CHART NOTE - NSCHARTNOTEFT_GEN_A_CORE
Pt seen and examined at 1700h for c/o "feeling foggy, can't keep eyes open, similar to how I felt when I first came in." Pt AOX3, remembers speaking to me  earlier in the day, wife at bedside. Pt denies chest pain, SOB. No seizure noted.  vital signs: 107/73-94-45-SpO2 97% on RA-T976.6F  blood sugar: 84  Gen: NAD  HEENT: PERRLA, (+) facial symmetry  CV:S1S2, RRR  Lungs: effort normal, CTA all fields  Abd: BS(+), soft, NT/ND  Ext: no edema BLE  Neuro : AOX3, appropriate, follows all commands, 4/5 strength all extremities.    81 yo M with CAD s/p stents ,history of esophagectomy for cancer, RA on steroids, BPH ,HTN, TIA, seizure on phenytoin, who presented with AMS, hypotension requiring pressors. Endo following for adrenal insufficiency, BP improved on stress steroids. Now with "feeling foggy" which resolved while examining and speaking with pt. Diet changed to soft diet per pt request.  Steroid changed from prednisone back to IV solucortef 50mg q8h per Endo who does not recommend Florinef at this time while pt on IV Solucortef. Will consider adding Florinef when pt on oral steroid. Draw Cortisol level prior to IV solucortef  -Above d/w attending Dr. Covington.   -ID consulted : observe off abx.  -Monitor and call for worsening condition  -D/w pt and family.  -D/w SANJUANA Alegria NP Medicine [id number]      -Follow-up:  Pt sitting up in chair, tolerating dinner. Pt states he is feeling better. Family at bedside.      Mariel Alegria NP Medicine [id number]

## 2018-04-24 NOTE — PROGRESS NOTE ADULT - ASSESSMENT
81 yo M with CAD s/p stents ,history of esophagectomy for cancer,  RA on steroids, BPH   a/w hypothermia, hypotensive, and hypoglycemic.  No clear localizing signs of infection.  urine isolate is likely a colonizer or contaminant--no  symptoms    Suggest:  Monitor conservatively off abx  Reviewed with family at bedside  additional w/u if fever or relapse of hypothermia

## 2018-04-24 NOTE — SWALLOW BEDSIDE ASSESSMENT ADULT - DIET PRIOR TO ADMI
Regular texture diet, as per Pt and wife's report that Jtube "came out" in the fall and MD recd oral diet and Pt gradually increased diet to regular texture with MD recommendations.  Pt/wife unable to recall dates and state that the Pt has not had an objective swallowing assessment (ie MBS), in the past.  Pt reports cough began when he was encouraged to cough following the esophageal surgery and reports that he usually brings up phlegm.  Pt denies cough correlates to eating/drinking.

## 2018-04-24 NOTE — SWALLOW BEDSIDE ASSESSMENT ADULT - ASR SWALLOW ASPIRATION MONITOR
*If evident, report to MD immediately and d/c oral diet./gurgly voice/change of breathing pattern/throat clearing/cough/upper respiratory infection/fever/pneumonia

## 2018-04-24 NOTE — SWALLOW BEDSIDE ASSESSMENT ADULT - SLP PERTINENT HISTORY OF CURRENT PROBLEM
Per tx note; 4/22/18 82 y/o M former smoker w/ hx of HTN, TIA, Seizures, RA, CAD s/p 6 stents most recently 4/17/18, Esophageal CA s/p chemo/RT and esophagogastrectomy recent admission for cardiac cath now presenting with hypotension likely 2/2 adrenal insufficiency.   Pt with coughing during eating, states has been happening ever since esophagectomy. Placed on soft diet and placed swallow evaluation. Sz recd cont Keppra. Recd f/u: /c abx if infectious work up neg; -slowly titrate down steroids if bp normal; -follow up speech eval.

## 2018-04-24 NOTE — PROGRESS NOTE ADULT - ASSESSMENT
84 y/o Esophageal Ca. CAD recent admission with stent placement admitted with fatigue, hypotension, hypoglycemia, and hypothermia. Patient on chronic steroids for RA.    Patient says that in the past year he lost 45 lbs. following a diagnosis of esophageal Ca, adjuvant chemo/ RT and surgery 8/17. He had multiple stents placed during this time, recently 4/17. After discharge he was compliant with prednisone 5 mg daily, no fever, no chills, but noted worsening fatigue at night. He was admitted with complaints of chest pain, however on admission he was noted with hypothermia, hypotension and hypoglycemia. All resolved with stress dose steroids. He was started on epimeric antibiotics, but no clear source of infection noted.   Symptoms on admission resolved with steroids, except low BP- patient is off his BP meds, started on midodrine.  Still on high dose hydrocortisone.    TFT's abnormality mostly due to treatment with dilantin and recent iodine dye- no evidence for pituitary disease: Normal prolactin, Increased T3 uptake, dilantin displaces thyroid hormones from binding proteins.    Patient was not on florinef in the past, his adrenal insuff. is secondary to steroid chronic use, not primary adrenal failure. Florinef and midodrine are used in patient with orthostatic hypotension.    After 3 days of steroid use in a stable patient can not expect adrenal insuff. to still be the cause of hypotension. If there was a component of it after the procedure it should have resolved by now.

## 2018-04-24 NOTE — SWALLOW BEDSIDE ASSESSMENT ADULT - COMMENTS
Addtnl hx per previous hospitalization d/c note: d/c note 8/27/17 ; 82 yr old male with hx of cancer of esophagus s/p chemo and radiation underwent an Esophagectomy and Feeding Jejunostomy placement on 07/14/17.  Post-op the pt had increasing leukocytosis and was started on antibiotics.  A López catheter was replaced for urinary retention. And then on 8/21, the barium swallow showed a small leak.  This was confirmed by a CT scan of the chest. The pt was kept NPO and his drain was continued.  He was given tube feeds through his J-tube and his López was removed.  His leukocytosis improved.  A repeat CT chest showed a persistent leak but no associated collection.  The pt was allowed to have PO meds with a sip of water.  Additionally, the pt had a transfusion of PRBCs on 8/27.  He was then considered stable for transfer to a rehab facility. hx cont: 4/23/18 Per ID: Complicated patient with underlying CAD s/p stents, history of esophagectomy for cancer, RA on steroids, BPH who was admitted on 4/21 hypothermic, hypotensive, and hypoglycemic.  No clear localizing signs of infection.  He has rapid turnaround of abnormal vitals with fluids and steroids.  His urine isolate is likely a colonizer or contaminant.  He has no  symptoms.  I would be conservative with antibiotic use.  ENT; H/O of recurrent epistaxis with known Septal Perforation with episode of epistaxis overnight self resolved, with no active bleeding on exam; cont saline use. Recommend humidification if nasal o2 is required.  Endocrine f/u; Adrenal insufficiency due to steroid withdrawal. Recommendation: Taper hydrocortisone IV to prednisone 20 mg daily.  Hypoglycemia.  Recommendation: Full TFT's, prolactin; R/O pituitary disease. 4/18/18 CXR: IMPRESSION: Right pleural effusion.  4/21/18 CXR: IMPRESSION: Right pleural effusion and adjacent passive atelectasis is unchanged from 4/18/2018.    Addtnl hx: per previous hospitalization d/c note: d/c note 8/27/17 ; 82 yr old male with hx of cancer of esophagus s/p chemo and radiation underwent an Esophagectomy and Feeding Jejunostomy placement on 07/14/17.  Post-op the pt had increasing leukocytosis and was started on antibiotics.  A López catheter was replaced for urinary retention. And then on 8/21, the barium swallow showed a small leak.  This was confirmed by a CT scan of the chest. The pt was kept NPO and his drain was continued.  He was given tube feeds through his J-tube and his López was removed.  His leukocytosis improved.  A repeat CT chest showed a persistent leak but no associated collection.  The pt was allowed to have PO meds with a sip of water.  Additionally, the pt had a transfusion of PRBCs on 8/27.  He was then considered stable for transfer to a rehab facility.

## 2018-04-24 NOTE — SWALLOW BEDSIDE ASSESSMENT ADULT - SLP GENERAL OBSERVATIONS
Pt awake, alert and oriented with wife at bedside.  Pt reports productive cough since time of surgery and exhibited productive cough prior to PO intake; however, coughing was not evident during PO trials.  Pt stated that he had an episode of a bloody nose with difficulty swallowing for one day (Pt seen by ENT);  Pt coughed up a "wad of blood" and difficulty swallowing resolved.  Pt also stated that he ate Nica Doone cookies at 1 am today without difficulty; Pt's wife also reported that he does not drink the thickened liquids that are being provided.

## 2018-04-24 NOTE — PROGRESS NOTE ADULT - SUBJECTIVE AND OBJECTIVE BOX
CC: f/u for    Patient reports    REVIEW OF SYSTEMS: no fevers, no chills, no nausea, no vomiting, no abd pain, no resp symptoms  All other review of systems negative (Comprehensive ROS)    Antimicrobials Day #      Other Medications Reviewed    T(F): 97.4 (04-24-18 @ 14:08), Max: 97.4 (04-23-18 @ 21:04)  HR: 67 (04-24-18 @ 14:08)  BP: 90/58 (04-24-18 @ 15:35)  RR: 18 (04-24-18 @ 14:08)  SpO2: 94% (04-24-18 @ 14:08)    PHYSICAL EXAM:  General: alert, no acute distress  Eyes:  anicteric, no conjunctival injection, no discharge  Oropharynx: no lesions or injection 	  Neck: supple, without adenopathy  Lungs: clear to auscultation  Heart: regular rate and rhythm; no murmur, rubs or gallops  Abdomen: soft, nondistended, nontender, without mass or organomegaly  Skin: no lesions  Extremities: no clubbing, cyanosis, or edema  Neurologic: alert, oriented, moves all extremities    LAB RESULTS:                        9.0    6.26  )-----------( 198      ( 24 Apr 2018 07:57 )             27.4     04-24    136  |  103  |  23  ----------------------------<  101<H>  3.6   |  26  |  0.68    Ca    9.0      24 Apr 2018 06:31  Phos  2.6     04-23  Mg     1.9     04-23            MICROBIOLOGY REVIEWED:    RADIOLOGY REVIEWED:

## 2018-04-25 ENCOUNTER — APPOINTMENT (OUTPATIENT)
Dept: PULMONOLOGY | Facility: CLINIC | Age: 83
End: 2018-04-25

## 2018-04-25 LAB
ANION GAP SERPL CALC-SCNC: 7 MMOL/L — SIGNIFICANT CHANGE UP (ref 5–17)
BUN SERPL-MCNC: 20 MG/DL — SIGNIFICANT CHANGE UP (ref 7–23)
CALCIUM SERPL-MCNC: 9 MG/DL — SIGNIFICANT CHANGE UP (ref 8.4–10.5)
CHLORIDE SERPL-SCNC: 104 MMOL/L — SIGNIFICANT CHANGE UP (ref 96–108)
CO2 SERPL-SCNC: 27 MMOL/L — SIGNIFICANT CHANGE UP (ref 22–31)
CREAT SERPL-MCNC: 0.6 MG/DL — SIGNIFICANT CHANGE UP (ref 0.5–1.3)
GGT SERPL-CCNC: 195 U/L — HIGH (ref 9–50)
GLUCOSE SERPL-MCNC: 80 MG/DL — SIGNIFICANT CHANGE UP (ref 70–99)
HCT VFR BLD CALC: 27.4 % — LOW (ref 39–50)
HGB BLD-MCNC: 8.8 G/DL — LOW (ref 13–17)
MAGNESIUM SERPL-MCNC: 2 MG/DL — SIGNIFICANT CHANGE UP (ref 1.6–2.6)
MCHC RBC-ENTMCNC: 29.8 PG — SIGNIFICANT CHANGE UP (ref 27–34)
MCHC RBC-ENTMCNC: 32.1 GM/DL — SIGNIFICANT CHANGE UP (ref 32–36)
MCV RBC AUTO: 92.9 FL — SIGNIFICANT CHANGE UP (ref 80–100)
PLATELET # BLD AUTO: 193 K/UL — SIGNIFICANT CHANGE UP (ref 150–400)
POTASSIUM SERPL-MCNC: 4 MMOL/L — SIGNIFICANT CHANGE UP (ref 3.5–5.3)
POTASSIUM SERPL-SCNC: 4 MMOL/L — SIGNIFICANT CHANGE UP (ref 3.5–5.3)
PROLACTIN SERPL-MCNC: 4.8 NG/ML — SIGNIFICANT CHANGE UP (ref 4.1–18.4)
RBC # BLD: 2.95 M/UL — LOW (ref 4.2–5.8)
RBC # FLD: 18.2 % — HIGH (ref 10.3–14.5)
SODIUM SERPL-SCNC: 138 MMOL/L — SIGNIFICANT CHANGE UP (ref 135–145)
WBC # BLD: 6.06 K/UL — SIGNIFICANT CHANGE UP (ref 3.8–10.5)
WBC # FLD AUTO: 6.06 K/UL — SIGNIFICANT CHANGE UP (ref 3.8–10.5)

## 2018-04-25 RX ORDER — FLUDROCORTISONE ACETATE 0.1 MG/1
0.1 TABLET ORAL DAILY
Qty: 0 | Refills: 0 | Status: DISCONTINUED | OUTPATIENT
Start: 2018-04-25 | End: 2018-04-26

## 2018-04-25 RX ADMIN — PREGABALIN 500 MICROGRAM(S): 225 CAPSULE ORAL at 11:20

## 2018-04-25 RX ADMIN — HEPARIN SODIUM 5000 UNIT(S): 5000 INJECTION INTRAVENOUS; SUBCUTANEOUS at 06:13

## 2018-04-25 RX ADMIN — TIOTROPIUM BROMIDE 1 CAPSULE(S): 18 CAPSULE ORAL; RESPIRATORY (INHALATION) at 11:20

## 2018-04-25 RX ADMIN — Medication 0.5 MILLIGRAM(S): at 17:17

## 2018-04-25 RX ADMIN — Medication 200 MILLIGRAM(S): at 17:10

## 2018-04-25 RX ADMIN — MIDODRINE HYDROCHLORIDE 5 MILLIGRAM(S): 2.5 TABLET ORAL at 21:21

## 2018-04-25 RX ADMIN — MIDODRINE HYDROCHLORIDE 5 MILLIGRAM(S): 2.5 TABLET ORAL at 15:00

## 2018-04-25 RX ADMIN — Medication 1 APPLICATION(S): at 06:12

## 2018-04-25 RX ADMIN — Medication 81 MILLIGRAM(S): at 11:20

## 2018-04-25 RX ADMIN — Medication 50 MILLIGRAM(S): at 11:20

## 2018-04-25 RX ADMIN — Medication 50 MILLIGRAM(S): at 18:29

## 2018-04-25 RX ADMIN — HEPARIN SODIUM 5000 UNIT(S): 5000 INJECTION INTRAVENOUS; SUBCUTANEOUS at 17:10

## 2018-04-25 RX ADMIN — Medication 200 MILLIGRAM(S): at 06:12

## 2018-04-25 RX ADMIN — BUDESONIDE AND FORMOTEROL FUMARATE DIHYDRATE 2 PUFF(S): 160; 4.5 AEROSOL RESPIRATORY (INHALATION) at 06:13

## 2018-04-25 RX ADMIN — Medication 1 APPLICATION(S): at 17:09

## 2018-04-25 RX ADMIN — MONTELUKAST 10 MILLIGRAM(S): 4 TABLET, CHEWABLE ORAL at 11:21

## 2018-04-25 RX ADMIN — FINASTERIDE 5 MILLIGRAM(S): 5 TABLET, FILM COATED ORAL at 11:20

## 2018-04-25 RX ADMIN — Medication 1 SPRAY(S): at 17:10

## 2018-04-25 RX ADMIN — MIDODRINE HYDROCHLORIDE 5 MILLIGRAM(S): 2.5 TABLET ORAL at 06:12

## 2018-04-25 RX ADMIN — HEPARIN SODIUM 5000 UNIT(S): 5000 INJECTION INTRAVENOUS; SUBCUTANEOUS at 21:21

## 2018-04-25 RX ADMIN — Medication 100 MILLIGRAM(S): at 11:20

## 2018-04-25 RX ADMIN — Medication 100 MILLIGRAM(S): at 03:11

## 2018-04-25 RX ADMIN — Medication 1 SPRAY(S): at 06:13

## 2018-04-25 RX ADMIN — CLOPIDOGREL BISULFATE 75 MILLIGRAM(S): 75 TABLET, FILM COATED ORAL at 11:20

## 2018-04-25 RX ADMIN — Medication 50 MILLIGRAM(S): at 03:16

## 2018-04-25 RX ADMIN — Medication 0.5 MILLIGRAM(S): at 06:12

## 2018-04-25 RX ADMIN — BUDESONIDE AND FORMOTEROL FUMARATE DIHYDRATE 2 PUFF(S): 160; 4.5 AEROSOL RESPIRATORY (INHALATION) at 17:10

## 2018-04-25 RX ADMIN — Medication 1 MILLIGRAM(S): at 11:20

## 2018-04-25 NOTE — PROGRESS NOTE ADULT - SUBJECTIVE AND OBJECTIVE BOX
CC: f/u for h ypothermia, hypoglycemia, hypotension resolved    Patient reports  feels well at preaent  REVIEW OF SYSTEMS:  All other review of systems negative (Comprehensive ROS)    Antimicrobials Day #  :    Other Medications Reviewed    T(F): 98.1 (04-25-18 @ 17:49), Max: 98.1 (04-25-18 @ 17:49)  HR: 68 (04-25-18 @ 17:49)  BP: 97/48 (04-25-18 @ 17:49)  RR: 16 (04-25-18 @ 17:49)  SpO2: 100% (04-25-18 @ 17:49)  Wt(kg): --    PHYSICAL EXAM:  General: alert, no acute distress  Eyes:  anicteric, no conjunctival injection, no discharge  Oropharynx: no lesions or injection 	  Neck: supple, without adenopathy  Lungs: basilar rales to auscultation  Heart: regular rate and rhythm; no murmur, rubs or gallops  Abdomen: soft, nondistended, nontender, without mass or organomegaly  Skin: no lesions  Extremities: no clubbing, cyanosis,. trace edema  Neurologic: alert, oriented, moves all extremities    LAB RESULTS:                        8.8    6.06  )-----------( 193      ( 25 Apr 2018 07:51 )             27.4     04-25    138  |  104  |  20  ----------------------------<  80  4.0   |  27  |  0.60    Ca    9.0      25 Apr 2018 06:21  Mg     2.0     04-25      LIVER FUNCTIONS - ( 25 Apr 2018 08:16 )  Alb: x     / Pro: x     / ALK PHOS: x     / ALT: x     / AST: x     / GGT: 195 U/L         MICROBIOLOGY:  RECENT CULTURES:  04-21 @ 06:37 .Urine Clean Catch (Midstream) Enterococcus faecalis    10,000 - 49,000 CFU/mL Enterococcus faecalis  <10,000 CFU/ml Normal Urogenital robel present      04-21 @ 06:24 .Blood Blood     No growth to date.  Urine Microscopic-Add On (NC) (04.21.18 @ 05:15)    White Blood Cell - Urine: 0-2 /HPF    Red Blood Cell - Urine: 0-2 /HPF    Bacteria: Few /HPF            RADIOLOGY REVIEWED:  < from: VA Duplex Lower Ext Vein Scan, Bilat (04.22.18 @ 13:42) >  IMPRESSION:     No evidence of bilateral lower extremity deep venous thrombosis.      < end of copied text >  < from: VA Duplex Lower Ext Vein Scan, Bilat (04.22.18 @ 13:42) >    IMPRESSION:     No evidence of bilateral lower extremity deep venous thrombosis.          < end of copied text >              < from: Xray Chest 1 View- PORTABLE-Urgent (04.21.18 @ 03:42) >  IMPRESSION:  Right pleural effusion and adjacentpassive atelectasis is unchanged from   4/18/2018.        < end of copied text >  Assessment:  Patient with ra on steroids, prior esophogectomy for cancer, recent stay for cardiac stent and came back a day later with hypotension, hypothermia and hypotension now resolved. No infection is apparent at present. No gu symptoms and no pyruria so urine culture not indicative of infection   Plan: monitor off antibiotics

## 2018-04-25 NOTE — PROGRESS NOTE ADULT - ASSESSMENT
pt  with  h/o htn, cad, stents, br  asthma, seizure, cva , ca esophagus,  c/c  cachexia, RA  on prednisone   s./p  recent cath,   with rico, to  LAD and angioplasty, D1, ON 4/17,  an d since then, had  low  bp,, and  was  on midodrine, on last admission  h/o  anemia ,   on c/c . prednisone,, for  RA,    low  bp,  stress dose  hydrocortisone,   does  not  appear to have  sepsis,    ID called  c/.c anemia  Pt  is dnr now,    echo  with modertae AS. / ?  severe   asa/ plavix   given recent pci  ent eval and endo  dr lida cruz, for  low  bp,  and  on  c/c  prednisone at home, noted  on  iv  hydrocortisone now, per  endo, awaiting pm cortisol level  ok to  advance diet, no artificial  feeding per  family.  and pt wants  regular diet

## 2018-04-25 NOTE — PROGRESS NOTE ADULT - SUBJECTIVE AND OBJECTIVE BOX
- Patient seen and examined.  - In summary, patient is a 82 year old man who presented with weakness and fatigue. (2018 11:11)         *****MEDICATIONS:    MEDICATIONS  (STANDING):  ALPRAZolam 0.5 milliGRAM(s) Oral two times a day  aspirin enteric coated 81 milliGRAM(s) Oral daily  BACItracin   Ointment 1 Application(s) Topical two times a day  buDESOnide  80 MICROgram(s)/formoterol 4.5 MICROgram(s) Inhaler 2 Puff(s) Inhalation two times a day  clopidogrel Tablet 75 milliGRAM(s) Oral daily  cyanocobalamin 500 MICROGram(s) Oral daily  finasteride 5 milliGRAM(s) Oral daily  folic acid 1 milliGRAM(s) Oral daily  heparin  Injectable 5000 Unit(s) SubCutaneous every 8 hours  hydrocortisone sodium succinate Injectable 50 milliGRAM(s) IV Push every 8 hours  midodrine 5 milliGRAM(s) Oral every 8 hours  montelukast 10 milliGRAM(s) Oral daily  phenytoin   Capsule 200 milliGRAM(s) Oral two times a day  pyridoxine 100 milliGRAM(s) Oral daily  sodium chloride 0.65% Nasal 1 Spray(s) Both Nostrils two times a day  tiotropium 18 MICROgram(s) Capsule 1 Capsule(s) Inhalation daily    MEDICATIONS  (PRN):  acetaminophen   Tablet 650 milliGRAM(s) Oral every 6 hours PRN For Temp greater than 38 C (100.4 F)  ALBUTerol/ipratropium for Nebulization 3 milliLiter(s) Nebulizer every 6 hours PRN Shortness of Breath and/or Wheezing             ***** REVIEW OF SYSTEM:  GEN: no fever, no chills, no pain  RESP: no SOB, no cough, no sputum  CVS: no chest pain, no palpitations, no edema  GI: no abdominal pain, no nausea, no vomiting, no constipation, no diarrhea  : no dysuria, no frequency  NEURO: no headache, no dizziness  PSYCH: no depression, not anxious  Derm : no itching, no rash         ***** VITAL SIGNS:    T(F): 97.6 (18 @ 04:22), Max: 97.6 (18 @ 16:44)  HR: 66 (18 @ 04:22) (66 - 76)  BP: 105/58 (18 @ 04:22) (88/46 - 107/61)  RR: 17 (18 @ 04:22) (17 - 18)  SpO2: 96% (18 @ 04:22) (94% - 98%)  Wt(kg): --  ,   I&O's Summary    2018 07:01  -  2018 07:00  --------------------------------------------------------  IN: 650 mL / OUT: 975 mL / NET: -325 mL               *****PHYSICAL EXAM:  GEN: A&O X 3 , NAD , comfortable  HEENT: NCAT, EOMI, MMM, no icterus  NECK: Supple, No JVD  CVS: S1S2 , regular , No M/R/G appreciated  PULM: CTA B/L,  no W/R/R appreciated  ABD.: soft. non tender, non distended,  bowel sounds present  Extrem: intact pulses , no edema noted  Derm: No rash or ecchymosis noted  PSYCH: normal mood, no depression, not anxious         *****LAB AND IMAGIN.0    6.26  )-----------( 198      ( 2018 07:57 )             27.4                   138  |  104  |  20  ----------------------------<  80  4.0   |  27  |  0.60    Ca    9.0      2018 06:21  Mg     2.0         [All pertinent recent Imaging/Reports reviewed]  < from: Transthoracic Echocardiogram (18 @ 14:23) >  Conclusions:  1. Mitral annular calcification, otherwise normal mitral  valve. Mild mitral regurgitation. Mean transmitral valve  gradient equals 4 mm Hg, consistent with mild mitral  stenosis. (HRabout 60s bpm)  2. Calcified trileaflet aortic valve with decreased  opening. Peak transaortic valve gradient equals 34 mm Hg,  mean transaortic valve gradient equals 19 mm Hg, estimated  aortic valve area equals 1.3 sqcm (by continuity equation),  aortic valve velocity time integral equals 59 cm,  consistent with moderate aortic stenosis. Aortic valve  assessment is limited by the presence of frequent PVCs and  the degree of aortic stenosis may be underestimated.  Visually, the aortic valve appears moderate-severely  stenotic. Consider additional imaging of the aortic valve  such as with additional TTE or TINO imaging if clinically  indicated.No aortic valve regurgitation seen.  3. Severely dilated left atrium.  LA volume index = 64  cc/m2.  4. Endocardium not well visualized; grossly normal left  ventricular systolic function. Septal motion consistent  with conduction defect. There is a false tendon in the LV  cavity (normal variant).  5. The right ventricle is not well visualized; right  ventricle appears enlarged with normal systolic function.  *** Compared with echocardiogram of 2017, results are  similar on today's study. Aortic valve gradients are higher  on this study.    < end of copied text >         *****A S S E S S M E N T   A N D   P L A N :  82M with HTN, Asthma, CVA, seizures, CAD who presents with mild chest discomfort, fatigue, diaphoresis, hypothermia, hypoglycemia, hypotension  off abx per ID  keep O=I  DAPT s/p recent PCI  BB if/when BP allows  echo noted  endo f/u noted  on steroids    __________________________  POLLO Brady D.O.

## 2018-04-25 NOTE — PROGRESS NOTE ADULT - SUBJECTIVE AND OBJECTIVE BOX
Chief Complaint: 82 y/o Esophageal Ca. CAD recent admission with stent placement admitted with fatigue, hypotension, hypoglycemia, and hypothermia. Patient on chronic steroids for RA.    Day #4 for IV steroids.  PO intake good.  Patient increasing salt intake in diet.  BP more stable.    MEDICATIONS  (STANDING):  ALPRAZolam 0.5 milliGRAM(s) Oral two times a day  aspirin enteric coated 81 milliGRAM(s) Oral daily  BACItracin   Ointment 1 Application(s) Topical two times a day  buDESOnide  80 MICROgram(s)/formoterol 4.5 MICROgram(s) Inhaler 2 Puff(s) Inhalation two times a day  clopidogrel Tablet 75 milliGRAM(s) Oral daily  cyanocobalamin 500 MICROGram(s) Oral daily  finasteride 5 milliGRAM(s) Oral daily  fludroCORTISONE 0.1 milliGRAM(s) Oral daily  folic acid 1 milliGRAM(s) Oral daily  heparin  Injectable 5000 Unit(s) SubCutaneous every 8 hours  midodrine 5 milliGRAM(s) Oral every 8 hours  montelukast 10 milliGRAM(s) Oral daily  phenytoin   Capsule 200 milliGRAM(s) Oral two times a day  pyridoxine 100 milliGRAM(s) Oral daily  sodium chloride 0.65% Nasal 1 Spray(s) Both Nostrils two times a day  tiotropium 18 MICROgram(s) Capsule 1 Capsule(s) Inhalation daily    MEDICATIONS  (PRN):  acetaminophen   Tablet 650 milliGRAM(s) Oral every 6 hours PRN For Temp greater than 38 C (100.4 F)  ALBUTerol/ipratropium for Nebulization 3 milliLiter(s) Nebulizer every 6 hours PRN Shortness of Breath and/or Wheezing      Allergies    penicillin (Rash)    Intolerances        PHYSICAL EXAM:  VITALS: T(C): 36.7 (04-25-18 @ 17:49)  T(F): 98.1 (04-25-18 @ 17:49), Max: 98.1 (04-25-18 @ 17:49)  HR: 68 (04-25-18 @ 17:49) (66 - 89)  BP: 97/48 (04-25-18 @ 17:49) (97/48 - 106/54)  RR:  (16 - 18)  SpO2:  (96% - 100%)  Wt(kg): 141 (bed weight)  GENERAL: NAD,   EYES: No proptosis,  HEENT:  Atraumatic, Normocephalic,   RESPIRATORY: Clear to auscultation bilaterally  CARDIOVASCULAR: Regular rhythm; No murmurs; peripheral edema left foot chronic.  GI: Soft, nontender, non distended, normal bowel sounds  SKIN: Dry, intact, No rashes or lesions  MUSCULOSKELETAL: decreased strength  NEURO: extraocular movements intact, no tremor, reduced reflexes  PSYCH: Alert and oriented x 3, normal affect, normal mood      POCT Blood Glucose.: 84 mg/dL (04-24-18 @ 16:52)                            8.8    6.06  )-----------( 193      ( 25 Apr 2018 07:51 )             27.4       04-25    138  |  104  |  20  ----------------------------<  80  4.0   |  27  |  0.60    EGFR if : 109  EGFR if non : 94    Ca    9.0      04-25  Mg     2.0     04-25  Phos  2.6     04-23        Thyroid Function Tests:  04-24 @ 07:51 TSH 7.80 FreeT4 0.6 T3 44 Anti TPO -- Anti Thyroglobulin Ab -- TSI --  04-21 @ 07:04 TSH 10.24 FreeT4 -- T3 -- Anti TPO -- Anti Thyroglobulin Ab -- TSI --

## 2018-04-25 NOTE — PROGRESS NOTE ADULT - SUBJECTIVE AND OBJECTIVE BOX
no  complaints  sbp in low 100's, which has  improved    REVIEW OF SYSTEMS:  CONSTITUTIONAL: No weakness,  no   fevers      RESPIRATORY:  No    shortness of breath  , no  wheezing  CARDIOVASCULAR: No chest pain,   no  palpitations, no  cough  GASTROINTESTINAL: No abdominal  pain, no  vomiting, no  diarrhea  NEUROLOGICAL: No  focal  weakness    MEDICATIONS  (STANDING):  ALPRAZolam 0.5 milliGRAM(s) Oral two times a day  aspirin enteric coated 81 milliGRAM(s) Oral daily  BACItracin   Ointment 1 Application(s) Topical two times a day  buDESOnide  80 MICROgram(s)/formoterol 4.5 MICROgram(s) Inhaler 2 Puff(s) Inhalation two times a day  clopidogrel Tablet 75 milliGRAM(s) Oral daily  cyanocobalamin 500 MICROGram(s) Oral daily  finasteride 5 milliGRAM(s) Oral daily  folic acid 1 milliGRAM(s) Oral daily  heparin  Injectable 5000 Unit(s) SubCutaneous every 8 hours  hydrocortisone sodium succinate Injectable 50 milliGRAM(s) IV Push every 8 hours  midodrine 5 milliGRAM(s) Oral every 8 hours  montelukast 10 milliGRAM(s) Oral daily  phenytoin   Capsule 200 milliGRAM(s) Oral two times a day  pyridoxine 100 milliGRAM(s) Oral daily  sodium chloride 0.65% Nasal 1 Spray(s) Both Nostrils two times a day  tiotropium 18 MICROgram(s) Capsule 1 Capsule(s) Inhalation daily    MEDICATIONS  (PRN):  acetaminophen   Tablet 650 milliGRAM(s) Oral every 6 hours PRN For Temp greater than 38 C (100.4 F)  ALBUTerol/ipratropium for Nebulization 3 milliLiter(s) Nebulizer every 6 hours PRN Shortness of Breath and/or Wheezing      Vital Signs Last 24 Hrs  T(C): 36.4 (25 Apr 2018 04:22), Max: 36.4 (24 Apr 2018 16:44)  T(F): 97.6 (25 Apr 2018 04:22), Max: 97.6 (24 Apr 2018 16:44)  HR: 66 (25 Apr 2018 04:22) (66 - 76)  BP: 105/58 (25 Apr 2018 04:22) (88/46 - 107/61)  BP(mean): --  RR: 17 (25 Apr 2018 04:22) (17 - 18)  SpO2: 96% (25 Apr 2018 04:22) (94% - 98%)  CAPILLARY BLOOD GLUCOSE      POCT Blood Glucose.: 84 mg/dL (24 Apr 2018 16:52)    I&O's Summary    24 Apr 2018 07:01  -  25 Apr 2018 07:00  --------------------------------------------------------  IN: 650 mL / OUT: 975 mL / NET: -325 mL        PHYSICAL EXAM:  HEAD:  Atraumatic, Normocephalic  NECK: Supple, No   JVD  CHEST/LUNG:   no     rales,     no,    rhonchi  HEART: Regular rate and rhythm;         murmur  ABDOMEN: Soft, Nontender, ;   EXTREMITIES:        edema  NEUROLOGY:  alert    LABS:                        9.0    6.26  )-----------( 198      ( 24 Apr 2018 07:57 )             27.4     04-25    138  |  104  |  20  ----------------------------<  80  4.0   |  27  |  0.60    Ca    9.0      25 Apr 2018 06:21  Mg     2.0     04-25                      Thyroid Stimulating Hormone, Serum: 7.80 uIU/mL (04-24 @ 07:51)          Consultant(s) Notes Reviewed:      Care Discussed with Consultants/Other Providers:

## 2018-04-25 NOTE — PROGRESS NOTE ADULT - ASSESSMENT
82 y/o Esophageal Ca. CAD recent admission with stent placement admitted with fatigue, hypotension, hypoglycemia, and hypothermia. Patient on chronic steroids for RA.    Patient says that in the past year he lost 45 lbs. following a diagnosis of esophageal Ca, adjuvant chemo/ RT and surgery 8/17. He had multiple stents placed during this time, recently 4/17. After discharge he was compliant with prednisone 5 mg daily, no fever, no chills, but noted worsening fatigue at night. He was admitted with complaints of chest pain, however on admission he was noted with hypothermia, hypotension and hypoglycemia. All resolved with stress dose steroids. He was started on epimeric antibiotics, but no clear source of infection noted.   Symptoms on admission resolved with steroids, except low BP- patient is off his BP meds, started on midodrine.  Still on high dose hydrocortisone.    TFT's abnormality mostly due to treatment with dilantin and recent iodine dye- no evidence for pituitary disease: Normal prolactin, Increased T3 uptake, dilantin displaces thyroid hormones from binding proteins.    Patient was not on florinef in the past, his adrenal insuff. is secondary to steroid chronic use, not primary adrenal failure. Florinef and midodrine are used in patient with orthostatic hypotension.    After 4 days of steroid use in a stable patient can not expect adrenal insuff. PM cortisol was sent as free cortisol and will be back in a few days.  Will change to oral treatment.

## 2018-04-25 NOTE — PROGRESS NOTE ADULT - SUBJECTIVE AND OBJECTIVE BOX
MEJIA LATIF:[id number],   82yMale followed for:  penicillin (Rash)    PAST MEDICAL & SURGICAL HISTORY:  Coronary artery disease: s/p 3 stents on June 30, 2017 at [facility] Dr. Lavelle Reid  OA (osteoarthritis) of knee: right  Former smoker, stopped smoking in distant past  Rheumatoid arthritis  Seizure disorder: 1 episode approximately 15 yrs ago  Asthma  Hyperlipidemia  BPH (benign prostatic hyperplasia)  HTN (hypertension)  Esophagus cancer  Chronic allergic rhinitis  BCC (basal cell carcinoma): skin  Cerebrovascular accident (CVA)  Anxiety  Anastomotic leak following esophagectomy: [place] [facility] esophagectomy  History of tonsillectomy  H/O heart artery stent: June 30, 2017  Knee arthropathy: left  History of total hip replacement: left  History of hernia repair    FAMILY HISTORY:  Family history of heart attack  Family history of pulmonary embolism: father @ 49 yr old  FH: CAD (coronary artery disease) (Sibling)    MEDICATIONS  (STANDING):  ALPRAZolam 0.5 milliGRAM(s) Oral two times a day  aspirin enteric coated 81 milliGRAM(s) Oral daily  BACItracin   Ointment 1 Application(s) Topical two times a day  buDESOnide  80 MICROgram(s)/formoterol 4.5 MICROgram(s) Inhaler 2 Puff(s) Inhalation two times a day  clopidogrel Tablet 75 milliGRAM(s) Oral daily  cyanocobalamin 500 MICROGram(s) Oral daily  finasteride 5 milliGRAM(s) Oral daily  folic acid 1 milliGRAM(s) Oral daily  heparin  Injectable 5000 Unit(s) SubCutaneous every 8 hours  hydrocortisone sodium succinate Injectable 50 milliGRAM(s) IV Push every 8 hours  midodrine 5 milliGRAM(s) Oral every 8 hours  montelukast 10 milliGRAM(s) Oral daily  phenytoin   Capsule 200 milliGRAM(s) Oral two times a day  pyridoxine 100 milliGRAM(s) Oral daily  sodium chloride 0.65% Nasal 1 Spray(s) Both Nostrils two times a day  tiotropium 18 MICROgram(s) Capsule 1 Capsule(s) Inhalation daily    MEDICATIONS  (PRN):  acetaminophen   Tablet 650 milliGRAM(s) Oral every 6 hours PRN For Temp greater than 38 C (100.4 F)  ALBUTerol/ipratropium for Nebulization 3 milliLiter(s) Nebulizer every 6 hours PRN Shortness of Breath and/or Wheezing      Vital Signs Last 24 Hrs  T(C): 36.4 (25 Apr 2018 04:22), Max: 36.4 (24 Apr 2018 16:44)  T(F): 97.6 (25 Apr 2018 04:22), Max: 97.6 (24 Apr 2018 16:44)  HR: 69 (25 Apr 2018 10:29) (66 - 76)  BP: 104/60 (25 Apr 2018 10:29) (88/46 - 107/61)  BP(mean): --  RR: 18 (25 Apr 2018 10:29) (17 - 18)  SpO2: 96% (25 Apr 2018 10:29) (94% - 98%)  nc/at  s1s2  cta  soft, nt, nd no guarding or rebound  no c/c/e    CBC Full  -  ( 24 Apr 2018 07:57 )  WBC Count : 6.26 K/uL  Hemoglobin : 9.0 g/dL  Hematocrit : 27.4 %  Platelet Count - Automated : 198 K/uL  Mean Cell Volume : 91.9 fl  Mean Cell Hemoglobin : 30.2 pg  Mean Cell Hemoglobin Concentration : 32.8 gm/dL  Auto Neutrophil # : x  Auto Lymphocyte # : x  Auto Monocyte # : x  Auto Eosinophil # : x  Auto Basophil # : x  Auto Neutrophil % : x  Auto Lymphocyte % : x  Auto Monocyte % : x  Auto Eosinophil % : x  Auto Basophil % : x    04-25    138  |  104  |  20  ----------------------------<  80  4.0   |  27  |  0.60    Ca    9.0      25 Apr 2018 06:21  Mg     2.0     04-25

## 2018-04-26 DIAGNOSIS — I25.10 ATHEROSCLEROTIC HEART DISEASE OF NATIVE CORONARY ARTERY WITHOUT ANGINA PECTORIS: ICD-10-CM

## 2018-04-26 DIAGNOSIS — C15.5 MALIGNANT NEOPLASM OF LOWER THIRD OF ESOPHAGUS: ICD-10-CM

## 2018-04-26 DIAGNOSIS — J45.20 MILD INTERMITTENT ASTHMA, UNCOMPLICATED: ICD-10-CM

## 2018-04-26 DIAGNOSIS — R13.10 DYSPHAGIA, UNSPECIFIED: ICD-10-CM

## 2018-04-26 DIAGNOSIS — R04.0 EPISTAXIS: ICD-10-CM

## 2018-04-26 DIAGNOSIS — Z29.9 ENCOUNTER FOR PROPHYLACTIC MEASURES, UNSPECIFIED: ICD-10-CM

## 2018-04-26 LAB
ANION GAP SERPL CALC-SCNC: 6 MMOL/L — SIGNIFICANT CHANGE UP (ref 5–17)
BLD GP AB SCN SERPL QL: NEGATIVE — SIGNIFICANT CHANGE UP
BUN SERPL-MCNC: 17 MG/DL — SIGNIFICANT CHANGE UP (ref 7–23)
CALCIUM SERPL-MCNC: 8.8 MG/DL — SIGNIFICANT CHANGE UP (ref 8.4–10.5)
CHLORIDE SERPL-SCNC: 103 MMOL/L — SIGNIFICANT CHANGE UP (ref 96–108)
CO2 SERPL-SCNC: 28 MMOL/L — SIGNIFICANT CHANGE UP (ref 22–31)
CREAT SERPL-MCNC: 0.61 MG/DL — SIGNIFICANT CHANGE UP (ref 0.5–1.3)
CULTURE RESULTS: SIGNIFICANT CHANGE UP
CULTURE RESULTS: SIGNIFICANT CHANGE UP
GLUCOSE SERPL-MCNC: 84 MG/DL — SIGNIFICANT CHANGE UP (ref 70–99)
HCT VFR BLD CALC: 24.5 % — LOW (ref 39–50)
HCT VFR BLD CALC: 24.6 % — LOW (ref 39–50)
HGB BLD-MCNC: 7.9 G/DL — LOW (ref 13–17)
HGB BLD-MCNC: 8.2 G/DL — LOW (ref 13–17)
MAGNESIUM SERPL-MCNC: 2 MG/DL — SIGNIFICANT CHANGE UP (ref 1.6–2.6)
MCHC RBC-ENTMCNC: 30.2 PG — SIGNIFICANT CHANGE UP (ref 27–34)
MCHC RBC-ENTMCNC: 32.2 GM/DL — SIGNIFICANT CHANGE UP (ref 32–36)
MCHC RBC-ENTMCNC: 32.3 PG — SIGNIFICANT CHANGE UP (ref 27–34)
MCHC RBC-ENTMCNC: 33.3 GM/DL — SIGNIFICANT CHANGE UP (ref 32–36)
MCV RBC AUTO: 93.5 FL — SIGNIFICANT CHANGE UP (ref 80–100)
MCV RBC AUTO: 96.9 FL — SIGNIFICANT CHANGE UP (ref 80–100)
PLATELET # BLD AUTO: 192 K/UL — SIGNIFICANT CHANGE UP (ref 150–400)
PLATELET # BLD AUTO: 228 K/UL — SIGNIFICANT CHANGE UP (ref 150–400)
POTASSIUM SERPL-MCNC: 4.1 MMOL/L — SIGNIFICANT CHANGE UP (ref 3.5–5.3)
POTASSIUM SERPL-SCNC: 4.1 MMOL/L — SIGNIFICANT CHANGE UP (ref 3.5–5.3)
RBC # BLD: 2.54 M/UL — LOW (ref 4.2–5.8)
RBC # BLD: 2.62 M/UL — LOW (ref 4.2–5.8)
RBC # FLD: 16.4 % — HIGH (ref 10.3–14.5)
RBC # FLD: 18.1 % — HIGH (ref 10.3–14.5)
RH IG SCN BLD-IMP: POSITIVE — SIGNIFICANT CHANGE UP
SODIUM SERPL-SCNC: 137 MMOL/L — SIGNIFICANT CHANGE UP (ref 135–145)
SPECIMEN SOURCE: SIGNIFICANT CHANGE UP
SPECIMEN SOURCE: SIGNIFICANT CHANGE UP
WBC # BLD: 4.88 K/UL — SIGNIFICANT CHANGE UP (ref 3.8–10.5)
WBC # BLD: 7.3 K/UL — SIGNIFICANT CHANGE UP (ref 3.8–10.5)
WBC # FLD AUTO: 4.88 K/UL — SIGNIFICANT CHANGE UP (ref 3.8–10.5)
WBC # FLD AUTO: 7.3 K/UL — SIGNIFICANT CHANGE UP (ref 3.8–10.5)

## 2018-04-26 PROCEDURE — 99233 SBSQ HOSP IP/OBS HIGH 50: CPT | Mod: GC

## 2018-04-26 PROCEDURE — 99222 1ST HOSP IP/OBS MODERATE 55: CPT

## 2018-04-26 PROCEDURE — 71250 CT THORAX DX C-: CPT | Mod: 26

## 2018-04-26 RX ORDER — FLUDROCORTISONE ACETATE 0.1 MG/1
0.1 TABLET ORAL
Qty: 0 | Refills: 0 | Status: DISCONTINUED | OUTPATIENT
Start: 2018-04-26 | End: 2018-04-29

## 2018-04-26 RX ORDER — SODIUM CHLORIDE 9 MG/ML
250 INJECTION INTRAMUSCULAR; INTRAVENOUS; SUBCUTANEOUS ONCE
Qty: 0 | Refills: 0 | Status: DISCONTINUED | OUTPATIENT
Start: 2018-04-26 | End: 2018-04-26

## 2018-04-26 RX ORDER — SODIUM CHLORIDE 0.65 %
1 AEROSOL, SPRAY (ML) NASAL
Qty: 0 | Refills: 0 | Status: DISCONTINUED | OUTPATIENT
Start: 2018-04-26 | End: 2018-05-03

## 2018-04-26 RX ADMIN — Medication 200 MILLIGRAM(S): at 06:02

## 2018-04-26 RX ADMIN — Medication 1 MILLIGRAM(S): at 11:38

## 2018-04-26 RX ADMIN — BUDESONIDE AND FORMOTEROL FUMARATE DIHYDRATE 2 PUFF(S): 160; 4.5 AEROSOL RESPIRATORY (INHALATION) at 17:23

## 2018-04-26 RX ADMIN — Medication 100 MILLIGRAM(S): at 11:37

## 2018-04-26 RX ADMIN — Medication 200 MILLIGRAM(S): at 17:23

## 2018-04-26 RX ADMIN — Medication 10 MILLIGRAM(S): at 06:01

## 2018-04-26 RX ADMIN — TIOTROPIUM BROMIDE 1 CAPSULE(S): 18 CAPSULE ORAL; RESPIRATORY (INHALATION) at 11:38

## 2018-04-26 RX ADMIN — Medication 1 SPRAY(S): at 17:27

## 2018-04-26 RX ADMIN — Medication 0.5 MILLIGRAM(S): at 17:23

## 2018-04-26 RX ADMIN — FLUDROCORTISONE ACETATE 0.1 MILLIGRAM(S): 0.1 TABLET ORAL at 17:23

## 2018-04-26 RX ADMIN — BUDESONIDE AND FORMOTEROL FUMARATE DIHYDRATE 2 PUFF(S): 160; 4.5 AEROSOL RESPIRATORY (INHALATION) at 06:01

## 2018-04-26 RX ADMIN — HEPARIN SODIUM 5000 UNIT(S): 5000 INJECTION INTRAVENOUS; SUBCUTANEOUS at 21:58

## 2018-04-26 RX ADMIN — MIDODRINE HYDROCHLORIDE 5 MILLIGRAM(S): 2.5 TABLET ORAL at 06:02

## 2018-04-26 RX ADMIN — HEPARIN SODIUM 5000 UNIT(S): 5000 INJECTION INTRAVENOUS; SUBCUTANEOUS at 14:46

## 2018-04-26 RX ADMIN — Medication 1 APPLICATION(S): at 06:02

## 2018-04-26 RX ADMIN — FLUDROCORTISONE ACETATE 0.1 MILLIGRAM(S): 0.1 TABLET ORAL at 06:02

## 2018-04-26 RX ADMIN — PREGABALIN 500 MICROGRAM(S): 225 CAPSULE ORAL at 11:38

## 2018-04-26 RX ADMIN — MONTELUKAST 10 MILLIGRAM(S): 4 TABLET, CHEWABLE ORAL at 11:38

## 2018-04-26 RX ADMIN — Medication 0.5 MILLIGRAM(S): at 06:02

## 2018-04-26 RX ADMIN — MIDODRINE HYDROCHLORIDE 5 MILLIGRAM(S): 2.5 TABLET ORAL at 21:58

## 2018-04-26 RX ADMIN — Medication 81 MILLIGRAM(S): at 11:38

## 2018-04-26 RX ADMIN — Medication 1 SPRAY(S): at 06:01

## 2018-04-26 RX ADMIN — FINASTERIDE 5 MILLIGRAM(S): 5 TABLET, FILM COATED ORAL at 11:38

## 2018-04-26 RX ADMIN — CLOPIDOGREL BISULFATE 75 MILLIGRAM(S): 75 TABLET, FILM COATED ORAL at 11:38

## 2018-04-26 RX ADMIN — Medication 1 SPRAY(S): at 23:06

## 2018-04-26 RX ADMIN — HEPARIN SODIUM 5000 UNIT(S): 5000 INJECTION INTRAVENOUS; SUBCUTANEOUS at 06:02

## 2018-04-26 RX ADMIN — Medication 1 APPLICATION(S): at 17:24

## 2018-04-26 RX ADMIN — MIDODRINE HYDROCHLORIDE 5 MILLIGRAM(S): 2.5 TABLET ORAL at 11:38

## 2018-04-26 NOTE — CONSULT NOTE ADULT - PROBLEM SELECTOR RECOMMENDATION 2
-patient now with hypotension in the setting of anemia and decrease in his steroid dose  -I would suggest increasing his steroids - either to a much higher dose of Prednsione or preferably back to IV Hydrocortisone with a slower taper -patient now with relative hypotension as compared to yesterday   -Endocrine evaluation noted with recommendation of Prednisone 10mg  -After D/W Dr. Covington - patient has had significant decrease in BP after his cardiac procedures and his new normal is 70-90mmHg systolic  -I would be concerned given the drop from 100 yesterday while on hydrocortisone to 80 today while on prednisone that he may have had a quick taper

## 2018-04-26 NOTE — CONSULT NOTE ADULT - PROBLEM SELECTOR PROBLEM 1
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
Epistaxis, recurrent
Recurrent epistaxis
R/O Hemoptysis

## 2018-04-26 NOTE — CONSULT NOTE ADULT - PROBLEM SELECTOR RECOMMENDATION 9
-I think patient is having bleeding from epistaxis as he has symptoms of prior deviated septum with bleeding, postnasal drip, scratchy throat, and nausea  -would recommend ENT evaluation  -it is reasonable to check a CT chest (noncontrast) to evaluate the lung parenchyma  -if he truly has lung parenchymal disease which is thought to be causing his hemoptysis he may require further intervention - he is known to Dr. Christian Weinstein of thoracic surgery  -patient is on DAPT for recent ROJAS - I think the risk of stopping this medication is much more than the benefits therefore would not stop DAPT  -serial CBC and transfuse as needed -DVT proph - would consider holding HSQ and starting venodynes  -GI proph  -maintain O2 sat > 88%  -Incentive spirometer  -Acapella  -PT

## 2018-04-26 NOTE — PROGRESS NOTE ADULT - ASSESSMENT
pt  with  h/o htn, cad, stents, br  asthma, seizure, cva , ca esophagus,  c/c  cachexia, RA  on prednisone   s./p  recent cath,   with rico, to  LAD and angioplasty, D1, ON 4/17,  an d since then, had  low  bp,, and  was  on midodrine, on last admission  h/o  anemia ,   on c/c . prednisone,, for  RA,    low  bp,  stress dose  hydrocortisone,   does  not  appear to have  sepsis,    ID called  c/.c anemia  Pt  is dnr now,    echo  with modertae AS. / ?  severe   asa/ plavix   given recent pci  ent eval and endo  dr lida cruz, for  low  bp,  and  on  c/c  prednisone at home, noted  on  prednisone  10 mg,/  florinef,   per  endo,  pt   does  not have  adrenal insufficiency  ok to  advance diet, no artificial  feeding per  family.  and  pt wants  regular diet  start  d/c  planning pt  with  h/o htn, cad, stents, br  asthma, seizure, cva , ca esophagus,  c/c  cachexia, RA  on prednisone   s./p  recent cath,   with rico, to  LAD and angioplasty, D1, ON 4/17,  an d since then, had  low  bp,, and  was  on midodrine, on last admission  h/o  anemia ,   on c/c . prednisone,, for  RA,    low  bp,  stress dose  hydrocortisone,   does  not  appear to have  sepsis,    ID called  c/.c anemia  Pt  is dnr now,    echo  with modertae AS. / ?  severe   asa/ plavix   given recent pci  ent eval and endo  dr lida cruz, for  low  bp,  and  on  c/c  prednisone at home, noted  on  prednisone  10 mg,/  florinef,   per  endo,  pt   does  not have  adrenal insufficiency  ok to  advance diet, no artificial  feeding per  family.  and  pt wants  regular diet  hb of  7,9.  rpt  hb pending, no rectal bleeding,   gi  to f/p  dr de anda

## 2018-04-26 NOTE — CHART NOTE - NSCHARTNOTEFT_GEN_A_CORE
Pt seen and examined for BP 80/40 HR 68 while sitting. Pt AOX3 , denies dizziness, weakness, palpitations, SOB, chest discomfort. Pt assisted back to bed , BP improved to 88/40. Pt endorses cough with bloody sputum since this morning. Wife at bedside.  vital signs: 80/40-68-18- SpO2 96% on RA-T97.6  General: NAD  HEENT: no  bleeding in nares, mouth  CV: S1S2, RRR  Lungs: effort normal , CTA bilat lungs'  Abd: BS(+), soft, NT/ND  Ext: 1+ edema BLE  Neuro: non-focal    83 yo males with PMH of HTN, CAD with stents,   asthma, seizure, CVA, ca esophagus,  c/c  cachexia, RA  on prednisone   s./p  recent cath,   with rico, to  LAD and angioplasty, D1, ON 4/17,  an d since then, had  low  bp,, and  was  on midodrine, on last admission Pt seen and examined for BP 80/40 HR 68 while sitting. Pt AOX3 , denies dizziness, weakness, palpitations, SOB, chest discomfort. Pt assisted back to bed , BP improved to 88/40. Pt endorses cough with bloody sputum since this morning. Wife at bedside.  vital signs: 80/40-68- (sitting); 88/40 (lying)-18- SpO2 96% on RA-T97.6  General: NAD  HEENT: no  bleeding in nares, mouth  CV: S1S2, RRR  Lungs: effort normal , CTA bilat lungs'  Abd: BS(+), soft, NT/ND  Ext: 1+ edema BLE  Neuro: non-focal      4/26 :0700H: Hgb/Hct: 7.9/24.5    81 yo males with PMH of HTN,   asthma, seizure, CVA,  esophageal cancer s/p chemo/RT, s/p esophagogastrectomy,  RA  on prednisone, CAD  s./p  recent cath,   ROJAS to  LAD and angioplasty, D1, ON 4/17,  and since then, had  low  BP and  was  on midodrine, on last admission. Pt presented with hypotension which improved after pressors , and treatment for  adrenal insufficiency  with IV steroids, which was switched to Prednisone + florinef this morning. Now with hypotension likely due to worsening anemia from blood loss /hemoptysis. Pt adds that he has h/o deviated septum "hole in his nose" for which he follows with outpatient  ENT (he does not remember name of ENT physician). Pt concerned that he may be bleeding from his nose and or his throat.     -No IV bolus per attending   -STAT CBC and transfuse prn   -Consult Pulm , ENT  -CT chest   -Increase Florinef to twice daily    -D/w attending  D/w pt and wife  -    Mariel Alegria NP Medicine [id number]    F/u : repeat: hgb/hct  8.2/24.6 . Will transfuse 1 unit PRBC. D/w pt and wife/daughter at bedside.  -Will consult Pulm Dr. William Juarez (who follows pt ) per pt request.

## 2018-04-26 NOTE — CONSULT NOTE ADULT - PROBLEM SELECTOR RECOMMENDATION 5
-continue current bronchodilator therapy  -patient was on Singulair, Asmanex, and Tudorza at home  -Incentive spirometer and Acapella  -Maintain O2 sat > 88% - with supplemental oxygen as needed

## 2018-04-26 NOTE — CONSULT NOTE ADULT - PROBLEM SELECTOR RECOMMENDATION 9
Nasal saline to each nostril 4x per day  Bacitracin to nares BID  Avoid nasal trauma  Avoid nose picking  Humidified oxygen with nasal cannula or face tent if oxygen is required

## 2018-04-26 NOTE — CONSULT NOTE ADULT - PROBLEM SELECTOR RECOMMENDATION 3
-possibly related to anemia - consider transfusion though patient does not appear to be having a brisk bleed  -or related to rapid steroid reduction  -does not appear infected at this time - continue to monitor vitals

## 2018-04-26 NOTE — PROGRESS NOTE ADULT - SUBJECTIVE AND OBJECTIVE BOX
MEJIA LATIF:[id number],   82yMale followed for:  penicillin (Rash)    PAST MEDICAL & SURGICAL HISTORY:  Coronary artery disease: s/p 3 stents on June 30, 2017 at [facility] Dr. Lavelle Reid  OA (osteoarthritis) of knee: right  Former smoker, stopped smoking in distant past  Rheumatoid arthritis  Seizure disorder: 1 episode approximately 15 yrs ago  Asthma  Hyperlipidemia  BPH (benign prostatic hyperplasia)  HTN (hypertension)  Esophagus cancer  Chronic allergic rhinitis  BCC (basal cell carcinoma): skin  Cerebrovascular accident (CVA)  Anxiety  Anastomotic leak following esophagectomy: [place] [facility] esophagectomy  History of tonsillectomy  H/O heart artery stent: June 30, 2017  Knee arthropathy: left  History of total hip replacement: left  History of hernia repair    FAMILY HISTORY:  Family history of heart attack  Family history of pulmonary embolism: father @ 49 yr old  FH: CAD (coronary artery disease) (Sibling)    MEDICATIONS  (STANDING):  ALPRAZolam 0.5 milliGRAM(s) Oral two times a day  aspirin enteric coated 81 milliGRAM(s) Oral daily  BACItracin   Ointment 1 Application(s) Topical two times a day  buDESOnide  80 MICROgram(s)/formoterol 4.5 MICROgram(s) Inhaler 2 Puff(s) Inhalation two times a day  clopidogrel Tablet 75 milliGRAM(s) Oral daily  cyanocobalamin 500 MICROGram(s) Oral daily  finasteride 5 milliGRAM(s) Oral daily  fludroCORTISONE 0.1 milliGRAM(s) Oral daily  folic acid 1 milliGRAM(s) Oral daily  heparin  Injectable 5000 Unit(s) SubCutaneous every 8 hours  midodrine 5 milliGRAM(s) Oral every 8 hours  montelukast 10 milliGRAM(s) Oral daily  phenytoin   Capsule 200 milliGRAM(s) Oral two times a day  predniSONE   Tablet 10 milliGRAM(s) Oral daily  pyridoxine 100 milliGRAM(s) Oral daily  sodium chloride 0.65% Nasal 1 Spray(s) Both Nostrils two times a day  tiotropium 18 MICROgram(s) Capsule 1 Capsule(s) Inhalation daily    MEDICATIONS  (PRN):  acetaminophen   Tablet 650 milliGRAM(s) Oral every 6 hours PRN For Temp greater than 38 C (100.4 F)  ALBUTerol/ipratropium for Nebulization 3 milliLiter(s) Nebulizer every 6 hours PRN Shortness of Breath and/or Wheezing      Vital Signs Last 24 Hrs  T(C): 36.4 (26 Apr 2018 04:49), Max: 36.7 (25 Apr 2018 17:49)  T(F): 97.6 (26 Apr 2018 04:49), Max: 98.1 (25 Apr 2018 17:49)  HR: 75 (26 Apr 2018 04:49) (68 - 89)  BP: 128/66 (26 Apr 2018 04:49) (97/48 - 128/66)  BP(mean): --  RR: 18 (26 Apr 2018 04:49) (16 - 18)  SpO2: 95% (26 Apr 2018 04:49) (94% - 100%)  nc/at  s1s2  cta  soft, nt, nd no guarding or rebound  no c/c/e    CBC Full  -  ( 25 Apr 2018 07:51 )  WBC Count : 6.06 K/uL  Hemoglobin : 8.8 g/dL  Hematocrit : 27.4 %  Platelet Count - Automated : 193 K/uL  Mean Cell Volume : 92.9 fl  Mean Cell Hemoglobin : 29.8 pg  Mean Cell Hemoglobin Concentration : 32.1 gm/dL  Auto Neutrophil # : x  Auto Lymphocyte # : x  Auto Monocyte # : x  Auto Eosinophil # : x  Auto Basophil # : x  Auto Neutrophil % : x  Auto Lymphocyte % : x  Auto Monocyte % : x  Auto Eosinophil % : x  Auto Basophil % : x    04-26    137  |  103  |  17  ----------------------------<  84  4.1   |  28  |  0.61    Ca    8.8      26 Apr 2018 06:51  Mg     2.0     04-26

## 2018-04-26 NOTE — CONSULT NOTE ADULT - ASSESSMENT
81 yo male with multiple comorbidities admitted with lethargy, fatigue and hypotension. Pt. is complaining of intermittent cough, scratchy throat and a post nasal drip.Pt. has a prior history of recurrent nosebleeds and recently had an episode of vomiting dark blood after a nosebleed.

## 2018-04-26 NOTE — CONSULT NOTE ADULT - SUBJECTIVE AND OBJECTIVE BOX
[facility] DIVISION OF PULMONARY, CRITICAL CARE AND SLEEP MEDICINE  PULMONARY CONSULTATION NOTE  PHONE NUMBER [phone] MONDAY-FRIDAY 8A-5P or [phone] on NIGHTS/WEEKENDS/HOLIDAYS    NAME: MEJIA LATIF  MEDICAL RECORD NUMBER: [id number]    CHIEF COMPLAINT: Patient is a 82y old  Male who presents with a chief complaint of cp (2018 11:28)      HISTORY OF PRESENT ILLNESS:       ====================BACKGROUND INFORMATION============================    FAMILY HISTORY: FAMILY HISTORY:  Family history of heart attack  Family history of pulmonary embolism: father @ 49 yr old  FH: CAD (coronary artery disease) (Sibling)      PAST MEDICAL AND SURGICAL HISTORY: PAST MEDICAL & SURGICAL HISTORY:  Coronary artery disease: s/p 3 stents on 2017 at [place] Dr. Lavelle Reid  OA (osteoarthritis) of knee: right  Former smoker, stopped smoking in distant past  Rheumatoid arthritis  Seizure disorder: 1 episode approximately 15 yrs ago  Asthma  Hyperlipidemia  BPH (benign prostatic hyperplasia)  HTN (hypertension)  Esophagus cancer  Chronic allergic rhinitis  BCC (basal cell carcinoma): skin  Cerebrovascular accident (CVA)  Anxiety  Anastomotic leak following esophagectomy: [place] logan esophagectomy  History of tonsillectomy  H/O heart artery stent: 2017  Knee arthropathy: left  History of total hip replacement: left  History of hernia repair      ALLERGIES:Allergies    penicillin (Rash)    Intolerances        HOME MEDICATIONS:     OUTPATIENT PULMONARY DOCTOR:     SOCIAL HISTORY:  TOBACCO USE:  ALCOHOL:  DRUGS:  MARITAL STATUS:  EMPLOYMENT:  EXPOSURES:  RECENT TRAVELS:  PETS:  CODE STATUS:    ====================REVIEW OF SYSTEMS=====================================  CONSTITUTIONAL:  CARDIOVASCULAR:  PULMONARY:  GASTROINTESTINAL:  [] ALL OTHER REVIEW OF SYSTEMS ARE NEGATIVE   [] UNABLE TO OBTAIN REVIEW OF SYSTEMS DUE TO ______________      ====================PHYSICAL EXAM=========================================    VITALS: ICU Vital Signs Last 24 Hrs  T(C): 36.4 (2018 10:24), Max: 36.7 (2018 17:49)  T(F): 97.6 (2018 10:24), Max: 98.1 (2018 17:49)  HR: 70 (2018 10:24) (68 - 89)  BP: 90/47 (2018 10:24) (86/38 - 128/66)  BP(mean): --  ABP: --  ABP(mean): --  RR: 18 (2018 10:24) (16 - 18)  SpO2: 96% (2018 10:24) (94% - 100%)      INTAKE and OUTPUT: I&O's Summary    2018 07:  -  2018 07:00  --------------------------------------------------------  IN: 480 mL / OUT: 700 mL / NET: -220 mL    2018 07:  -  2018 11:29  --------------------------------------------------------  IN: 240 mL / OUT: 0 mL / NET: 240 mL        WEIGHT: Daily     Daily Weight in k.8 (2018 05:50)    GLUCOSE: CAPILLARY BLOOD GLUCOSE      POCT Blood Glucose.: 84 mg/dL (2018 16:52)      VENTILATOR SETTINGS:     GENERAL:  HEENT:  NECK:   LYMPH NODES:  CARDIOVASCULAR:  PULMONARY:  ABDOMEN:  SKIN:  EXTREMITIES:  NEUROLOGIC:  PSYCHIATRIC:    ====================MEDICATIONS===========================================  MEDICATIONS  (STANDING):  ALPRAZolam 0.5 milliGRAM(s) Oral two times a day  aspirin enteric coated 81 milliGRAM(s) Oral daily  BACItracin   Ointment 1 Application(s) Topical two times a day  buDESOnide  80 MICROgram(s)/formoterol 4.5 MICROgram(s) Inhaler 2 Puff(s) Inhalation two times a day  clopidogrel Tablet 75 milliGRAM(s) Oral daily  cyanocobalamin 500 MICROGram(s) Oral daily  finasteride 5 milliGRAM(s) Oral daily  fludroCORTISONE 0.1 milliGRAM(s) Oral two times a day  folic acid 1 milliGRAM(s) Oral daily  heparin  Injectable 5000 Unit(s) SubCutaneous every 8 hours  midodrine 5 milliGRAM(s) Oral every 8 hours  montelukast 10 milliGRAM(s) Oral daily  phenytoin   Capsule 200 milliGRAM(s) Oral two times a day  predniSONE   Tablet 10 milliGRAM(s) Oral daily  pyridoxine 100 milliGRAM(s) Oral daily  sodium chloride 0.65% Nasal 1 Spray(s) Both Nostrils two times a day  tiotropium 18 MICROgram(s) Capsule 1 Capsule(s) Inhalation daily      MEDICATIONS  (PRN):  acetaminophen   Tablet 650 milliGRAM(s) Oral every 6 hours PRN For Temp greater than 38 C (100.4 F)  ALBUTerol/ipratropium for Nebulization 3 milliLiter(s) Nebulizer every 6 hours PRN Shortness of Breath and/or Wheezing      ====================LABORATORY RESULTS====================================  CBC Full  -  ( 2018 10:29 )  WBC Count : 7.3 K/uL  Hemoglobin : 8.2 g/dL  Hematocrit : 24.6 %  Platelet Count - Automated : 228 K/uL  Mean Cell Volume : 96.9 fl  Mean Cell Hemoglobin : 32.3 pg  Mean Cell Hemoglobin Concentration : 33.3 gm/dL  Auto Neutrophil # : x  Auto Lymphocyte # : x  Auto Monocyte # : x  Auto Eosinophil # : x  Auto Basophil # : x  Auto Neutrophil % : x  Auto Lymphocyte % : x  Auto Monocyte % : x  Auto Eosinophil % : x  Auto Basophil % : x                                    04-26    137  |  103  |  17  ----------------------------<  84  4.1   |  28  |  0.61    Ca    8.8      2018 06:51  Mg     2.0                   Creatinine Trend: 0.61<--, 0.60<--, 0.68<--, 0.67<--, 0.73<--, 0.70<--      ====================RADIOLOGY and ECHOCARDIOGRAPHY=======================    CXR:    CT CHEST:    ECHOCARDIOGRAPHY:    POINT OF CARE ULTRASOUND: [facility] DIVISION OF PULMONARY, CRITICAL CARE AND SLEEP MEDICINE  PULMONARY CONSULTATION NOTE  PHONE NUMBER [phone] MONDAY-FRIDAY 8A-5P or [phone] on NIGHTS/WEEKENDS/HOLIDAYS    NAME: MEJIA LATIF  MEDICAL RECORD NUMBER: [id number]    CHIEF COMPLAINT: Patient is a 82y old  Male who presents with a chief complaint of cp (2018 11:28)      HISTORY OF PRESENT ILLNESS: 82M lengthy medical history including CAD with recent stent (2018), asthma, former smoker, esophageal cancer with extensive surgery who was rehospitalized with feelings of lethargy and fatigue in setting of hypotension which was thought to be mediated by secondary adrenal insufficiency. He was initially in the MICU requiring stress dose steroids and transient vasopressors. His hemodynamics improved quickly on stress dose steroids and he was transitioned to the medical floors.     On the floors patient has had an intermittent cough with a scratchy throat. He has postnasal drip. He is on dual antiplatelets due to his recent stents. He has had a history of deviated septum with prior nose bleeds. Two days ago on the floors he had an episode of vomiting with dark blood which occurred shortly after a nosebleed. He does not describe his symptoms as related to cough and does not describe any hemoptysis at present. He and wife say he has had intermittent sputum but it has not been blood tinged today.     He feels weak. His denies chest pain or palpitations. He denies nausea. He has chronic dysphagia which has been worse since his surgery. He has been seen by Dr. Christian Weinstein of Thoracic Surgery in the past.     This AM his hemoglobin was a little bit lower than previously. In addition, his steroids were cut from Hydrocort 50Q8 to Prednisone 10mg. He is now hypotensive this AM.      ====================BACKGROUND INFORMATION============================    FAMILY HISTORY: FAMILY HISTORY:  Family history of heart attack  Family history of pulmonary embolism: father @ 49 yr old  FH: CAD (coronary artery disease) (Sibling)      PAST MEDICAL AND SURGICAL HISTORY: PAST MEDICAL & SURGICAL HISTORY:  Coronary artery disease: s/p 3 stents on 2017 at [place] Dr. Lavelle Reid  OA (osteoarthritis) of knee: right  Former smoker, stopped smoking in distant past  Rheumatoid arthritis  Seizure disorder: 1 episode approximately 15 yrs ago  Asthma  Hyperlipidemia  BPH (benign prostatic hyperplasia)  HTN (hypertension)  Esophagus cancer  Chronic allergic rhinitis  BCC (basal cell carcinoma): skin  Cerebrovascular accident (CVA)  Anxiety  Anastomotic leak following esophagectomy: [place] logan esophagectomy  History of tonsillectomy  H/O heart artery stent: 2017  Knee arthropathy: left  History of total hip replacement: left  History of hernia repair      ALLERGIES:Allergies:  penicillin (Rash)    HOME MEDICATIONS: reviewed     OUTPATIENT PULMONARY DOCTOR: William Juarez MD    SOCIAL HISTORY  TOBACCO USE: former   ALCOHOL: denied   DRUGS: denied   MARITAL STATUS:    EMPLOYMENT: insurance sales  EXPOSURES: none   RECENT TRAVELS: none   PETS: previously had a cat and dog - no longer   CODE STATUS: DNR/DNI    ====================REVIEW OF SYSTEMS=====================================  CONSTITUTIONAL: Feels weak  CARDIOVASCULAR: Denies chest pain or palpitations  PULMONARY: Cough, Denies Hemoptysis  GASTROINTESTINAL: Nausea, vomiting x 1 episode 2 days ago, No abdominal pain  [x] ALL OTHER REVIEW OF SYSTEMS ARE NEGATIVE   [] UNABLE TO OBTAIN REVIEW OF SYSTEMS DUE TO ______________      ====================PHYSICAL EXAM=========================================    VITALS: ICU Vital Signs Last 24 Hrs  T(C): 36.4 (2018 10:24), Max: 36.7 (2018 17:49)  T(F): 97.6 (2018 10:24), Max: 98.1 (2018 17:49)  HR: 70 (2018 10:24) (68 - 89)  BP: 90/47 (2018 10:24) (86/38 - 128/66)  BP(mean): --  ABP: --  ABP(mean): --  RR: 18 (2018 10:24) (16 - 18)  SpO2: 96% (2018 10:24) (94% - 100%)      INTAKE and OUTPUT: I&O's Summary    2018 07:  -  2018 07:00  --------------------------------------------------------  IN: 480 mL / OUT: 700 mL / NET: -220 mL    2018 07:  -  2018 11:29  --------------------------------------------------------  IN: 240 mL / OUT: 0 mL / NET: 240 mL        WEIGHT: Daily     Daily Weight in k.8 (2018 05:50)    GLUCOSE: CAPILLARY BLOOD GLUCOSE:  POCT Blood Glucose.: 84 mg/dL (2018 16:52)      VENTILATOR SETTINGS: N/A    GENERAL: AAOx3, NAD, comfortable in bed  HEENT: normocephalic, atraumatic, EOMI, anicteric, MMM, nares clear, no dry blood in oropharynx, trachea midline  NECK: supple, no JVD  LYMPH NODES: no palpable supraclavicular LAD  CARDIOVASCULAR: RRR, S1S2  PULMONARY: CTA bilaterally, no wheeze or rhonchi  ABDOMEN: soft, NT, ND, +BS  SKIN: warm and dry  EXTREMITIES: no clubbing or cyanosis  NEUROLOGIC: moves all extremities  PSYCHIATRIC: calm    ====================MEDICATIONS===========================================  MEDICATIONS  (STANDING):  ALPRAZolam 0.5 milliGRAM(s) Oral two times a day  aspirin enteric coated 81 milliGRAM(s) Oral daily  BACItracin   Ointment 1 Application(s) Topical two times a day  buDESOnide  80 MICROgram(s)/formoterol 4.5 MICROgram(s) Inhaler 2 Puff(s) Inhalation two times a day  clopidogrel Tablet 75 milliGRAM(s) Oral daily  cyanocobalamin 500 MICROGram(s) Oral daily  finasteride 5 milliGRAM(s) Oral daily  fludroCORTISONE 0.1 milliGRAM(s) Oral two times a day  folic acid 1 milliGRAM(s) Oral daily  heparin  Injectable 5000 Unit(s) SubCutaneous every 8 hours  midodrine 5 milliGRAM(s) Oral every 8 hours  montelukast 10 milliGRAM(s) Oral daily  phenytoin   Capsule 200 milliGRAM(s) Oral two times a day  predniSONE   Tablet 10 milliGRAM(s) Oral daily  pyridoxine 100 milliGRAM(s) Oral daily  sodium chloride 0.65% Nasal 1 Spray(s) Both Nostrils two times a day  tiotropium 18 MICROgram(s) Capsule 1 Capsule(s) Inhalation daily      MEDICATIONS  (PRN):  acetaminophen   Tablet 650 milliGRAM(s) Oral every 6 hours PRN For Temp greater than 38 C (100.4 F)  ALBUTerol/ipratropium for Nebulization 3 milliLiter(s) Nebulizer every 6 hours PRN Shortness of Breath and/or Wheezing      ====================LABORATORY RESULTS====================================  CBC Full  -  ( 2018 10:29 )  WBC Count : 7.3 K/uL  Hemoglobin : 8.2 g/dL  Hematocrit : 24.6 %  Platelet Count - Automated : 228 K/uL  Mean Cell Volume : 96.9 fl  Mean Cell Hemoglobin : 32.3 pg  Mean Cell Hemoglobin Concentration : 33.3 gm/dL                                          137  |  103  |  17  ----------------------------<  84  4.1   |  28  |  0.61    Ca    8.8      2018 06:51  Mg     2.0             Creatinine Trend: 0.61<--, 0.60<--, 0.68<--, 0.67<--, 0.73<--, 0.70<--      ====================RADIOLOGY and ECHOCARDIOGRAPHY=======================    CXR: < from: Xray Chest 1 View- PORTABLE-Urgent (18 @ 03:42) >  IMPRESSION:  Right pleural effusion and adjacentpassive atelectasis is unchanged from 2018.    < end of copied text >    CT CHEST: < from: CT Chest No Cont (17 @ 15:06) >  CT scan of the chest was obtained without administration of intravenous  contrast. In addition, dynamic expiratory images of the chest were also obtained.    No hilar and/or mediastinal adenopathy is noted.     Heart is enlarged in size. Calcification of the aortic valve and the coronary arteries is noted. No pericardial effusion is noted.    Patient is status post esophagectomy with gastric pull-up.    No endobronchial lesions are noted. There is no collapse of the trachea on the dynamic expiratory images. Centrilobular emphysema is noted   bilaterally.Minimal atelectasis is noted in both lower lobes. Small pleural effusions are noted bilaterally, the right is somewhat loculated.   They're unchanged when compared to previous exam.    Below the diaphragm, visualized portions of the abdomen demonstrate  low-attenuation lesions within both kidneys which are incompletely   assessed on this exam.     Degenerative changes of the spine are noted.    Impression: There is no CT evidence of tracheomalacia.    Small bilateral pleural effusions are unchanged when compared to previous   exam.    < end of copied text >      ECHOCARDIOGRAPHY: < from: Transthoracic Echocardiogram (18 @ 14:23) >  Conclusions:  1. Mitral annular calcification, otherwise normal mitral  valve. Mild mitral regurgitation. Mean transmitral valve  gradient equals 4 mm Hg, consistent with mild mitral  stenosis. (HRabout 60s bpm)  2. Calcified trileaflet aortic valve with decreased  opening. Peak transaortic valve gradient equals 34 mm Hg,  mean transaortic valve gradient equals 19 mm Hg, estimated  aortic valve area equals 1.3 sqcm (by continuity equation),  aortic valve velocity time integral equals 59 cm,  consistent with moderate aortic stenosis. Aortic valve  assessment is limited by the presence of frequent PVCs and  the degree of aortic stenosis may be underestimated.  Visually, the aortic valve appears moderate-severely  stenotic. Consider additional imaging of the aortic valve  such as with additional TTE or TINO imaging if clinically  indicated.No aortic valve regurgitation seen.  3. Severely dilated left atrium.  LA volume index = 64  cc/m2.  4. Endocardium not well visualized; grossly normal left  ventricular systolic function. Septal motion consistent  with conduction defect. There is a false tendon in the LV  cavity (normal variant).  5. The right ventricle is not well visualized; right  ventricle appears enlarged with normal systolic function.    < end of copied text >

## 2018-04-26 NOTE — PROGRESS NOTE ADULT - SUBJECTIVE AND OBJECTIVE BOX
CC: f/u for hypothermia, hypotension    Patient reports    REVIEW OF SYSTEMS:  All other review of systems negative (Comprehensive ROS)    Antimicrobials Day #  :    Other Medications Reviewed    T(F): 98 (04-26-18 @ 18:00), Max: 98 (04-26-18 @ 18:00)  HR: 76 (04-26-18 @ 18:00)  BP: 110/56 (04-26-18 @ 18:00)  RR: 18 (04-26-18 @ 18:00)  SpO2: 94% (04-26-18 @ 18:00)  Wt(kg): --    PHYSICAL EXAM:  General: alert, no acute distress  Eyes:  anicteric, no conjunctival injection, no discharge  Oropharynx: no lesions or injection 	  Neck: supple, without adenopathy  Lungs: course to auscultation  Heart: regular rate and rhythm; no murmur, rubs or gallops  Abdomen: soft, nondistended, nontender, without mass or organomegaly  Skin: no lesions  Extremities: no clubbing, cyanosis, or edema  Neurologic: alert, oriented, moves all extremities    LAB RESULTS:                        8.2    7.3   )-----------( 228      ( 26 Apr 2018 10:29 )             24.6     04-26    137  |  103  |  17  ----------------------------<  84  4.1   |  28  |  0.61    Ca    8.8      26 Apr 2018 06:51  Mg     2.0     04-26      LIVER FUNCTIONS - ( 25 Apr 2018 08:16 )  Alb: x     / Pro: x     / ALK PHOS: x     / ALT: x     / AST: x     / GGT: 195 U/L         MICROBIOLOGY:  RECENT CULTURES:  Culture - Blood (04.21.18 @ 06:24)    Specimen Source: .Blood Blood    Culture Results:   No growth at 5 days.    leg neg  rvp neg    RADIOLOGY REVIEWED:    < from: CT Chest No Cont (04.26.18 @ 12:59) >  IMPRESSION:    Nodular opacities in the lower lungs bilaterally, right greater than   left, with scattered linear subsegmental atelectasis likely representing   mucoid impaction and atelectasis. . The presence of a gastric   pull-through with accumulation of fluid and/ordebris within the   pull-through up to the level of the thoracic inlet also raises the   possibility of prior aspiration event.    Mildly decreased small pleural effusions bilaterally with redemonstration   of partial loculation of the right pleural effusion.      < end of copied text >      Assessment:  Patient with ra on steroids, esophogeal cancer s/p resection with post op leak in past, recent cardiac stent returns a day  post d/c with hypothermia, hypotension and rapidly improved. No infection apparent. Some epistaxis/hemoptysis now resolved. CT shows no cancer or pneumonia  Plan: monitor off antibiotics  call if further input is needed

## 2018-04-26 NOTE — CONSULT NOTE ADULT - ASSESSMENT
82M CAD with multiple stents (most recently 4/2018), asthma, former smoker, esophageal cancer with esophagectomy and gastric pullup, RA on chronic steroids who presents with hypotension in the setting of secondary adrenal insufficiency who now has had epistaxis with expectoration of blood (?hemoptysis vs hematemesis) and now with hypotension in the setting of decreased steroid dose

## 2018-04-26 NOTE — PROGRESS NOTE ADULT - ASSESSMENT
locally advanced esophageal ca, s/p maryjane logan.  sono ordered, will need ct/ mr if positive.  has had this in past, but would follow.

## 2018-04-26 NOTE — PROGRESS NOTE ADULT - SUBJECTIVE AND OBJECTIVE BOX
- Patient seen and examined.  - In summary, patient is a 82 year old man who presented with weakness and fatigue. (2018 11:11)         *****MEDICATIONS:    MEDICATIONS  (STANDING):  ALPRAZolam 0.5 milliGRAM(s) Oral two times a day  aspirin enteric coated 81 milliGRAM(s) Oral daily  BACItracin   Ointment 1 Application(s) Topical two times a day  buDESOnide  80 MICROgram(s)/formoterol 4.5 MICROgram(s) Inhaler 2 Puff(s) Inhalation two times a day  clopidogrel Tablet 75 milliGRAM(s) Oral daily  cyanocobalamin 500 MICROGram(s) Oral daily  finasteride 5 milliGRAM(s) Oral daily  fludroCORTISONE 0.1 milliGRAM(s) Oral daily  folic acid 1 milliGRAM(s) Oral daily  heparin  Injectable 5000 Unit(s) SubCutaneous every 8 hours  midodrine 5 milliGRAM(s) Oral every 8 hours  montelukast 10 milliGRAM(s) Oral daily  phenytoin   Capsule 200 milliGRAM(s) Oral two times a day  predniSONE   Tablet 10 milliGRAM(s) Oral daily  pyridoxine 100 milliGRAM(s) Oral daily  sodium chloride 0.65% Nasal 1 Spray(s) Both Nostrils two times a day  tiotropium 18 MICROgram(s) Capsule 1 Capsule(s) Inhalation daily    MEDICATIONS  (PRN):  acetaminophen   Tablet 650 milliGRAM(s) Oral every 6 hours PRN For Temp greater than 38 C (100.4 F)  ALBUTerol/ipratropium for Nebulization 3 milliLiter(s) Nebulizer every 6 hours PRN Shortness of Breath and/or Wheezing               ***** REVIEW OF SYSTEM:  GEN: no fever, no chills, no pain  RESP: no SOB, no cough, no sputum  CVS: no chest pain, no palpitations, no edema  GI: no abdominal pain, no nausea, no vomiting, no constipation, no diarrhea  : no dysuria, no frequency  NEURO: no headache, no dizziness  PSYCH: no depression, not anxious  Derm : no itching, no rash         ***** VITAL SIGNS:    T(F): 97.6 (18 @ 04:49), Max: 98.1 (18 @ 17:49)  HR: 75 (18 @ 04:49) (68 - 89)  BP: 128/66 (18 @ 04:49) (97/48 - 128/66)  RR: 18 (18 @ 04:49) (16 - 18)  SpO2: 95% (18 @ 04:49) (94% - 100%)  Wt(kg): --  ,   I&O's Summary    2018 07:01  -  2018 07:00  --------------------------------------------------------  IN: 480 mL / OUT: 700 mL / NET: -220 mL                   *****PHYSICAL EXAM:  GEN: A&O X 3 , NAD , comfortable  HEENT: NCAT, EOMI, MMM, no icterus  NECK: Supple, No JVD  CVS: S1S2 , regular , No M/R/G appreciated  PULM: CTA B/L,  no W/R/R appreciated  ABD.: soft. non tender, non distended,  bowel sounds present  Extrem: intact pulses , no edema noted  Derm: No rash or ecchymosis noted  PSYCH: normal mood, no depression, not anxious         *****LAB AND IMAGIN.9    4.88  )-----------( 192      ( 2018 07:35 )             24.5                   137  |  103  |  17  ----------------------------<  84  4.1   |  28  |  0.61    Ca    8.8      2018 06:51  Mg     2.0           [All pertinent recent Imaging/Reports reviewed]  < from: Transthoracic Echocardiogram (18 @ 14:23) >  Conclusions:  1. Mitral annular calcification, otherwise normal mitral  valve. Mild mitral regurgitation. Mean transmitral valve  gradient equals 4 mm Hg, consistent with mild mitral  stenosis. (HRabout 60s bpm)  2. Calcified trileaflet aortic valve with decreased  opening. Peak transaortic valve gradient equals 34 mm Hg,  mean transaortic valve gradient equals 19 mm Hg, estimated  aortic valve area equals 1.3 sqcm (by continuity equation),  aortic valve velocity time integral equals 59 cm,  consistent with moderate aortic stenosis. Aortic valve  assessment is limited by the presence of frequent PVCs and  the degree of aortic stenosis may be underestimated.  Visually, the aortic valve appears moderate-severely  stenotic. Consider additional imaging of the aortic valve  such as with additional TTE or TINO imaging if clinically  indicated.No aortic valve regurgitation seen.  3. Severely dilated left atrium.  LA volume index = 64  cc/m2.  4. Endocardium not well visualized; grossly normal left  ventricular systolic function. Septal motion consistent  with conduction defect. There is a false tendon in the LV  cavity (normal variant).  5. The right ventricle is not well visualized; right  ventricle appears enlarged with normal systolic function.  *** Compared with echocardiogram of 2017, results are  similar on today's study. Aortic valve gradients are higher  on this study.    < end of copied text >         *****A S S E S S M E N T   A N D   P L A N :  82M with HTN, Asthma, CVA, seizures, CAD who presents with mild chest discomfort, fatigue, diaphoresis, hypothermia, hypoglycemia, hypotension  off abx per ID  keep O=I  DAPT s/p recent PCI  BB if/when BP allows  echo noted  endo f/u noted  taper steroids  DCP    __________________________  POLLO Brady D.O.

## 2018-04-26 NOTE — CONSULT NOTE ADULT - SUBJECTIVE AND OBJECTIVE BOX
CC: Recurrent epistaxis    HPI: . 82M lengthy medical history including CAD with recent stent (4/2018), asthma, former smoker, esophageal cancer with extensive surgery who was rehospitalized with feelings of lethargy , fatigue and hypotension .. He was initially in the MICU requiring stress dose steroids and transient vasopressors. His hemodynamics improved quickly on stress dose steroids and he was transitioned to the medical floors.     On the floors patient has had an intermittent cough with a scratchy throat. He has postnasal drip. He is on dual antiplatelets due to his recent stents. He has had a history of deviated septum with prior nose bleeds. Two days ago on the floors he had an episode of vomiting with dark blood which occurred shortly after a nosebleed. He does not describe his symptoms as related to cough and does not describe any hemoptysis at present. He and wife say he has had intermittent sputum but it has not been blood tinged recently.    PAST MEDICAL & SURGICAL HISTORY:  Coronary artery disease: s/p 3 stents on June 30, 2017 at [place] Dr. 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  History of tonsillectomy  H/O heart artery stent: June 30, 2017  Knee arthropathy: left  History of total hip replacement: left  History of hernia repair    Allergies    penicillin (Rash)    Intolerances      MEDICATIONS  (STANDING):  ALPRAZolam 0.5 milliGRAM(s) Oral two times a day  aspirin enteric coated 81 milliGRAM(s) Oral daily  BACItracin   Ointment 1 Application(s) Topical two times a day  buDESOnide  80 MICROgram(s)/formoterol 4.5 MICROgram(s) Inhaler 2 Puff(s) Inhalation two times a day  clopidogrel Tablet 75 milliGRAM(s) Oral daily  cyanocobalamin 500 MICROGram(s) Oral daily  finasteride 5 milliGRAM(s) Oral daily  fludroCORTISONE 0.1 milliGRAM(s) Oral two times a day  folic acid 1 milliGRAM(s) Oral daily  heparin  Injectable 5000 Unit(s) SubCutaneous every 8 hours  midodrine 5 milliGRAM(s) Oral every 8 hours  montelukast 10 milliGRAM(s) Oral daily  phenytoin   Capsule 200 milliGRAM(s) Oral two times a day  predniSONE   Tablet 10 milliGRAM(s) Oral daily  pyridoxine 100 milliGRAM(s) Oral daily  sodium chloride 0.65% Nasal 1 Spray(s) Both Nostrils two times a day  tiotropium 18 MICROgram(s) Capsule 1 Capsule(s) Inhalation daily    MEDICATIONS  (PRN):  acetaminophen   Tablet 650 milliGRAM(s) Oral every 6 hours PRN For Temp greater than 38 C (100.4 F)  ALBUTerol/ipratropium for Nebulization 3 milliLiter(s) Nebulizer every 6 hours PRN Shortness of Breath and/or Wheezing    Social History: former smoker    ROS: ENT, GI, , CV, Pulm, Neuro, Psych, MS, Heme, Endo, Constitional; all negative except as noted in HPI    Vital Signs Last 24 Hrs  T(C): 36.3 (26 Apr 2018 11:48), Max: 36.7 (25 Apr 2018 17:49)  T(F): 97.4 (26 Apr 2018 11:48), Max: 98.1 (25 Apr 2018 17:49)  HR: 71 (26 Apr 2018 11:48) (68 - 75)  BP: 88/38 (26 Apr 2018 12:41) (83/33 - 128/66)  BP(mean): --  RR: 18 (26 Apr 2018 11:48) (16 - 18)  SpO2: 92% (26 Apr 2018 11:48) (92% - 100%)                          8.2    7.3   )-----------( 228      ( 26 Apr 2018 10:29 )             24.6    04-26    137  |  103  |  17  ----------------------------<  84  4.1   |  28  |  0.61    Ca    8.8      26 Apr 2018 06:51  Mg     2.0     04-26         PHYSICAL EXAM:  Gen: NAD, well-developed  Head: Normocephalic, Atraumatic  Face: no edema/erythema/fluctuance, parotid glands soft without mass  Eyes: PERRL, EOMI, no scleral injection  Ears: Right - ear canal clear, TM intact without effusion            Left - ear canal clear, TM intact without effusion  Nose: Nares bilaterally patent, no discharge  Mouth: Mucosa moist, tongue/uvula midline, oropharynx clear  Neck: Flat, supple, no lymphadenopathy, trachea midline, no masses  Resp: breathing easily, no stridor  CV: no peripheral edema/cyanosis CC: Recurrent epistaxis    HPI: . 82M lengthy medical history including CAD with recent stent (4/2018), asthma, former smoker, esophageal cancer with extensive surgery who was rehospitalized with feelings of lethargy , fatigue and hypotension .. He was initially in the MICU requiring stress dose steroids and transient vasopressors. His hemodynamics improved quickly on stress dose steroids and he was transitioned to the medical floors.     On the floors patient has had an intermittent cough with a scratchy throat. He has postnasal drip. He is on dual antiplatelets due to his recent stents. He has had a history of deviated septum with prior nose bleeds. Two days ago on the floors he had an episode of vomiting with dark blood which occurred shortly after a nosebleed. He does not describe his symptoms as related to cough and does not describe any hemoptysis at present. He and wife say he has had intermittent sputum but it has not been blood tinged until today when pt. claims he spit up bright red blood this morning..    PAST MEDICAL & SURGICAL HISTORY:  Coronary artery disease: s/p 3 stents on June 30, 2017 at [place] Dr. 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  History of tonsillectomy  H/O heart artery stent: June 30, 2017  Knee arthropathy: left  History of total hip replacement: left  History of hernia repair    Allergies    penicillin (Rash)    Intolerances      MEDICATIONS  (STANDING):  ALPRAZolam 0.5 milliGRAM(s) Oral two times a day  aspirin enteric coated 81 milliGRAM(s) Oral daily  BACItracin   Ointment 1 Application(s) Topical two times a day  buDESOnide  80 MICROgram(s)/formoterol 4.5 MICROgram(s) Inhaler 2 Puff(s) Inhalation two times a day  clopidogrel Tablet 75 milliGRAM(s) Oral daily  cyanocobalamin 500 MICROGram(s) Oral daily  finasteride 5 milliGRAM(s) Oral daily  fludroCORTISONE 0.1 milliGRAM(s) Oral two times a day  folic acid 1 milliGRAM(s) Oral daily  heparin  Injectable 5000 Unit(s) SubCutaneous every 8 hours  midodrine 5 milliGRAM(s) Oral every 8 hours  montelukast 10 milliGRAM(s) Oral daily  phenytoin   Capsule 200 milliGRAM(s) Oral two times a day  predniSONE   Tablet 10 milliGRAM(s) Oral daily  pyridoxine 100 milliGRAM(s) Oral daily  sodium chloride 0.65% Nasal 1 Spray(s) Both Nostrils two times a day  tiotropium 18 MICROgram(s) Capsule 1 Capsule(s) Inhalation daily    MEDICATIONS  (PRN):  acetaminophen   Tablet 650 milliGRAM(s) Oral every 6 hours PRN For Temp greater than 38 C (100.4 F)  ALBUTerol/ipratropium for Nebulization 3 milliLiter(s) Nebulizer every 6 hours PRN Shortness of Breath and/or Wheezing    Social History: former smoker    ROS: ENT, GI, , CV, Pulm, Neuro, Psych, MS, Heme, Endo, Constitional; all negative except as noted in HPI    Vital Signs Last 24 Hrs  T(C): 36.3 (26 Apr 2018 11:48), Max: 36.7 (25 Apr 2018 17:49)  T(F): 97.4 (26 Apr 2018 11:48), Max: 98.1 (25 Apr 2018 17:49)  HR: 71 (26 Apr 2018 11:48) (68 - 75)  BP: 88/38 (26 Apr 2018 12:41) (83/33 - 128/66)  BP(mean): --  RR: 18 (26 Apr 2018 11:48) (16 - 18)  SpO2: 92% (26 Apr 2018 11:48) (92% - 100%)                          8.2    7.3   )-----------( 228      ( 26 Apr 2018 10:29 )             24.6    04-26    137  |  103  |  17  ----------------------------<  84  4.1   |  28  |  0.61    Ca    8.8      26 Apr 2018 06:51  Mg     2.0     04-26         PHYSICAL EXAM:  Gen: NAD, well-developed  Head: Normocephalic, Atraumatic  Face: no edema/erythema/fluctuance, parotid glands soft without mass  Eyes: PERRL, EOMI, no scleral injection  Ears: Right - hearing aides present            Left - hearing aides present  Nose: Nares bilaterally patent, no discharge, + septal deviation with clot, no active epistaxis  Mouth: Mucosa moist, tongue/uvula midline, oropharynx clear  Neck: Flat, supple, no lymphadenopathy, trachea midline, no masses  Resp: breathing easily, no stridor  CV: no peripheral edema/cyanosis CC: Recurrent epistaxis    HPI: . 82M lengthy medical history including CAD with recent stent (4/2018), asthma, former smoker, esophageal cancer with extensive surgery who was rehospitalized with feelings of lethargy , fatigue and hypotension .. He was initially in the MICU requiring stress dose steroids and transient vasopressors. His hemodynamics improved quickly on stress dose steroids and he was transitioned to the medical floors.     On the floors patient has had an intermittent cough with a scratchy throat. He has postnasal drip. He is on dual antiplatelets due to his recent stents. He has had a history of deviated septum with prior nose bleeds. Two days ago on the floors he had an episode of vomiting with dark blood which occurred shortly after a nosebleed. He does not describe his symptoms as related to cough and does not describe any hemoptysis at present. He and wife say he has had intermittent sputum but it has not been blood tinged until today when pt. claims he spit up bright red blood this morning..    PAST MEDICAL & SURGICAL HISTORY:  Coronary artery disease: s/p 3 stents on June 30, 2017 at [place] Dr. 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  History of tonsillectomy  H/O heart artery stent: June 30, 2017  Knee arthropathy: left  History of total hip replacement: left  History of hernia repair    Allergies    penicillin (Rash)    Intolerances      MEDICATIONS  (STANDING):  ALPRAZolam 0.5 milliGRAM(s) Oral two times a day  aspirin enteric coated 81 milliGRAM(s) Oral daily  BACItracin   Ointment 1 Application(s) Topical two times a day  buDESOnide  80 MICROgram(s)/formoterol 4.5 MICROgram(s) Inhaler 2 Puff(s) Inhalation two times a day  clopidogrel Tablet 75 milliGRAM(s) Oral daily  cyanocobalamin 500 MICROGram(s) Oral daily  finasteride 5 milliGRAM(s) Oral daily  fludroCORTISONE 0.1 milliGRAM(s) Oral two times a day  folic acid 1 milliGRAM(s) Oral daily  heparin  Injectable 5000 Unit(s) SubCutaneous every 8 hours  midodrine 5 milliGRAM(s) Oral every 8 hours  montelukast 10 milliGRAM(s) Oral daily  phenytoin   Capsule 200 milliGRAM(s) Oral two times a day  predniSONE   Tablet 10 milliGRAM(s) Oral daily  pyridoxine 100 milliGRAM(s) Oral daily  sodium chloride 0.65% Nasal 1 Spray(s) Both Nostrils two times a day  tiotropium 18 MICROgram(s) Capsule 1 Capsule(s) Inhalation daily    MEDICATIONS  (PRN):  acetaminophen   Tablet 650 milliGRAM(s) Oral every 6 hours PRN For Temp greater than 38 C (100.4 F)  ALBUTerol/ipratropium for Nebulization 3 milliLiter(s) Nebulizer every 6 hours PRN Shortness of Breath and/or Wheezing    Social History: former smoker    ROS: ENT, GI, , CV, Pulm, Neuro, Psych, MS, Heme, Endo, Constitional; all negative except as noted in HPI    Vital Signs Last 24 Hrs  T(C): 36.3 (26 Apr 2018 11:48), Max: 36.7 (25 Apr 2018 17:49)  T(F): 97.4 (26 Apr 2018 11:48), Max: 98.1 (25 Apr 2018 17:49)  HR: 71 (26 Apr 2018 11:48) (68 - 75)  BP: 88/38 (26 Apr 2018 12:41) (83/33 - 128/66)  BP(mean): --  RR: 18 (26 Apr 2018 11:48) (16 - 18)  SpO2: 92% (26 Apr 2018 11:48) (92% - 100%)                          8.2    7.3   )-----------( 228      ( 26 Apr 2018 10:29 )             24.6    04-26    137  |  103  |  17  ----------------------------<  84  4.1   |  28  |  0.61    Ca    8.8      26 Apr 2018 06:51  Mg     2.0     04-26         PHYSICAL EXAM:  Gen: NAD, well-developed  Head: Normocephalic, Atraumatic  Face: no edema/erythema/fluctuance, parotid glands soft without mass  Eyes: PERRL, EOMI, no scleral injection  Ears: Right - hearing aides present            Left - hearing aides present  Nose: Nares bilaterally patent, no discharge, + septal deviation with bloody crust,, no active epistaxis  Mouth: Mucosa moist, tongue/uvula midline, oropharynx clear  Neck: Flat, supple, no lymphadenopathy, trachea midline, no masses  Resp: breathing easily, no stridor  CV: no peripheral edema/cyanosis    FOE/Laryngoscope: nobleeding in nasal pharynx or Oral pharynx.  No foreign body or pooling of secretions.  No glottic / supraglottic swelling.  Vocal cords mobile in intact b/l.  Airway patent. CC: Recurrent epistaxis    HPI: . 82M lengthy medical history including CAD with recent stent (4/2018), asthma, former smoker, esophageal cancer with extensive surgery who was rehospitalized with feelings of lethargy , fatigue and hypotension .. He was initially in the MICU requiring stress dose steroids and transient vasopressors. His hemodynamics improved quickly on stress dose steroids and he was transitioned to the medical floors.     On the floors patient has had an intermittent cough with a scratchy throat. He has postnasal drip. He is on dual antiplatelets due to his recent stents. He has had a history of deviated septum with prior nose bleeds. Two days ago on the floors he had an episode of vomiting with dark blood which occurred shortly after a nosebleed. He does not describe his symptoms as related to cough and does not describe any hemoptysis at present. He and wife say he has had intermittent sputum but it has not been blood tinged until today when pt. claims he spit up bright red blood this morning..    PAST MEDICAL & SURGICAL HISTORY:  Coronary artery disease: s/p 3 stents on June 30, 2017 at [place] Dr. 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  History of tonsillectomy  H/O heart artery stent: June 30, 2017  Knee arthropathy: left  History of total hip replacement: left  History of hernia repair    Allergies    penicillin (Rash)    Intolerances      MEDICATIONS  (STANDING):  ALPRAZolam 0.5 milliGRAM(s) Oral two times a day  aspirin enteric coated 81 milliGRAM(s) Oral daily  BACItracin   Ointment 1 Application(s) Topical two times a day  buDESOnide  80 MICROgram(s)/formoterol 4.5 MICROgram(s) Inhaler 2 Puff(s) Inhalation two times a day  clopidogrel Tablet 75 milliGRAM(s) Oral daily  cyanocobalamin 500 MICROGram(s) Oral daily  finasteride 5 milliGRAM(s) Oral daily  fludroCORTISONE 0.1 milliGRAM(s) Oral two times a day  folic acid 1 milliGRAM(s) Oral daily  heparin  Injectable 5000 Unit(s) SubCutaneous every 8 hours  midodrine 5 milliGRAM(s) Oral every 8 hours  montelukast 10 milliGRAM(s) Oral daily  phenytoin   Capsule 200 milliGRAM(s) Oral two times a day  predniSONE   Tablet 10 milliGRAM(s) Oral daily  pyridoxine 100 milliGRAM(s) Oral daily  sodium chloride 0.65% Nasal 1 Spray(s) Both Nostrils two times a day  tiotropium 18 MICROgram(s) Capsule 1 Capsule(s) Inhalation daily    MEDICATIONS  (PRN):  acetaminophen   Tablet 650 milliGRAM(s) Oral every 6 hours PRN For Temp greater than 38 C (100.4 F)  ALBUTerol/ipratropium for Nebulization 3 milliLiter(s) Nebulizer every 6 hours PRN Shortness of Breath and/or Wheezing    Social History: former smoker    ROS: ENT, GI, , CV, Pulm, Neuro, Psych, MS, Heme, Endo, Constitional; all negative except as noted in HPI    Vital Signs Last 24 Hrs  T(C): 36.3 (26 Apr 2018 11:48), Max: 36.7 (25 Apr 2018 17:49)  T(F): 97.4 (26 Apr 2018 11:48), Max: 98.1 (25 Apr 2018 17:49)  HR: 71 (26 Apr 2018 11:48) (68 - 75)  BP: 88/38 (26 Apr 2018 12:41) (83/33 - 128/66)  BP(mean): --  RR: 18 (26 Apr 2018 11:48) (16 - 18)  SpO2: 92% (26 Apr 2018 11:48) (92% - 100%)                          8.2    7.3   )-----------( 228      ( 26 Apr 2018 10:29 )             24.6    04-26    137  |  103  |  17  ----------------------------<  84  4.1   |  28  |  0.61    Ca    8.8      26 Apr 2018 06:51  Mg     2.0     04-26         PHYSICAL EXAM:  Gen: NAD, well-developed  Head: Normocephalic, Atraumatic  Face: no edema/erythema/fluctuance, parotid glands soft without mass  Eyes: PERRL, EOMI, no scleral injection  Ears: Right - hearing aides present            Left - hearing aides present  Nose: Nares bilaterally patent, no discharge, + septal perforation with bloody crust,, no active epistaxis  Mouth: Mucosa moist, tongue/uvula midline, oropharynx clear  Neck: Flat, supple, no lymphadenopathy, trachea midline, no masses  Resp: breathing easily, no stridor  CV: no peripheral edema/cyanosis    FOE/Laryngoscope: large septal perforation, no bleeding in nasal pharynx or Oral pharynx.  No foreign body or pooling of secretions.  No glottic / supraglottic swelling.  Vocal cords mobile in intact b/l.  Airway patent.

## 2018-04-26 NOTE — PROGRESS NOTE ADULT - SUBJECTIVE AND OBJECTIVE BOX
no  complaints,  very  talkative    REVIEW OF SYSTEMS:  CONSTITUTIONAL: No weakness,  no   fevers      RESPIRATORY:  No    shortness of breath  , no  wheezing  CARDIOVASCULAR: No chest pain,   no  palpitations, no  cough  GASTROINTESTINAL: No abdominal  pain, no  vomiting, no  diarrhea  NEUROLOGICAL: No  focal  weakness    MEDICATIONS  (STANDING):  ALPRAZolam 0.5 milliGRAM(s) Oral two times a day  aspirin enteric coated 81 milliGRAM(s) Oral daily  BACItracin   Ointment 1 Application(s) Topical two times a day  buDESOnide  80 MICROgram(s)/formoterol 4.5 MICROgram(s) Inhaler 2 Puff(s) Inhalation two times a day  clopidogrel Tablet 75 milliGRAM(s) Oral daily  cyanocobalamin 500 MICROGram(s) Oral daily  finasteride 5 milliGRAM(s) Oral daily  fludroCORTISONE 0.1 milliGRAM(s) Oral daily  folic acid 1 milliGRAM(s) Oral daily  heparin  Injectable 5000 Unit(s) SubCutaneous every 8 hours  midodrine 5 milliGRAM(s) Oral every 8 hours  montelukast 10 milliGRAM(s) Oral daily  phenytoin   Capsule 200 milliGRAM(s) Oral two times a day  predniSONE   Tablet 10 milliGRAM(s) Oral daily  pyridoxine 100 milliGRAM(s) Oral daily  sodium chloride 0.65% Nasal 1 Spray(s) Both Nostrils two times a day  tiotropium 18 MICROgram(s) Capsule 1 Capsule(s) Inhalation daily    MEDICATIONS  (PRN):  acetaminophen   Tablet 650 milliGRAM(s) Oral every 6 hours PRN For Temp greater than 38 C (100.4 F)  ALBUTerol/ipratropium for Nebulization 3 milliLiter(s) Nebulizer every 6 hours PRN Shortness of Breath and/or Wheezing      Vital Signs Last 24 Hrs  T(C): 36.4 (26 Apr 2018 04:49), Max: 36.7 (25 Apr 2018 17:49)  T(F): 97.6 (26 Apr 2018 04:49), Max: 98.1 (25 Apr 2018 17:49)  HR: 75 (26 Apr 2018 04:49) (68 - 89)  BP: 128/66 (26 Apr 2018 04:49) (97/48 - 128/66)  BP(mean): --  RR: 18 (26 Apr 2018 04:49) (16 - 18)  SpO2: 95% (26 Apr 2018 04:49) (94% - 100%)  CAPILLARY BLOOD GLUCOSE        I&O's Summary    25 Apr 2018 07:01  -  26 Apr 2018 07:00  --------------------------------------------------------  IN: 480 mL / OUT: 700 mL / NET: -220 mL        PHYSICAL EXAM:  HEAD:  Atraumatic, Normocephalic  NECK: Supple, No   JVD  CHEST/LUNG:   no     rales,     no,    rhonchi  HEART: Regular rate and rhythm;         murmur  ABDOMEN: Soft, Nontender, ;   EXTREMITIES:        edema  NEUROLOGY:  alert    LABS:                        8.8    6.06  )-----------( 193      ( 25 Apr 2018 07:51 )             27.4     04-26    137  |  103  |  17  ----------------------------<  84  4.1   |  28  |  0.61    Ca    8.8      26 Apr 2018 06:51  Mg     2.0     04-26                      Thyroid Stimulating Hormone, Serum: 7.80 uIU/mL (04-24 @ 07:51)          Consultant(s) Notes Reviewed:      Care Discussed with Consultants/Other Providers:

## 2018-04-26 NOTE — CONSULT NOTE ADULT - PROBLEM SELECTOR RECOMMENDATION 6
-GI evaluation  -patient with esophagectomy with gastric pullup -GI evaluation noted   -patient with esophagectomy with gastric pullup  -await abdominal sono

## 2018-04-27 ENCOUNTER — APPOINTMENT (OUTPATIENT)
Dept: PULMONOLOGY | Facility: CLINIC | Age: 83
End: 2018-04-27

## 2018-04-27 DIAGNOSIS — R06.02 SHORTNESS OF BREATH: ICD-10-CM

## 2018-04-27 LAB
ANION GAP SERPL CALC-SCNC: 6 MMOL/L — SIGNIFICANT CHANGE UP (ref 5–17)
BUN SERPL-MCNC: 20 MG/DL — SIGNIFICANT CHANGE UP (ref 7–23)
CALCIUM SERPL-MCNC: 8.7 MG/DL — SIGNIFICANT CHANGE UP (ref 8.4–10.5)
CHLORIDE SERPL-SCNC: 103 MMOL/L — SIGNIFICANT CHANGE UP (ref 96–108)
CO2 SERPL-SCNC: 29 MMOL/L — SIGNIFICANT CHANGE UP (ref 22–31)
CREAT SERPL-MCNC: 0.64 MG/DL — SIGNIFICANT CHANGE UP (ref 0.5–1.3)
GLUCOSE SERPL-MCNC: 63 MG/DL — LOW (ref 70–99)
HCT VFR BLD CALC: 23.3 % — LOW (ref 39–50)
HCT VFR BLD CALC: 23.8 % — LOW (ref 39–50)
HGB BLD-MCNC: 7.5 G/DL — LOW (ref 13–17)
HGB BLD-MCNC: 8.4 G/DL — LOW (ref 13–17)
MAGNESIUM SERPL-MCNC: 2 MG/DL — SIGNIFICANT CHANGE UP (ref 1.6–2.6)
MCHC RBC-ENTMCNC: 30.9 PG — SIGNIFICANT CHANGE UP (ref 27–34)
MCHC RBC-ENTMCNC: 32.2 GM/DL — SIGNIFICANT CHANGE UP (ref 32–36)
MCHC RBC-ENTMCNC: 33.9 PG — SIGNIFICANT CHANGE UP (ref 27–34)
MCHC RBC-ENTMCNC: 35.1 GM/DL — SIGNIFICANT CHANGE UP (ref 32–36)
MCV RBC AUTO: 95.9 FL — SIGNIFICANT CHANGE UP (ref 80–100)
MCV RBC AUTO: 96.8 FL — SIGNIFICANT CHANGE UP (ref 80–100)
PLATELET # BLD AUTO: 174 K/UL — SIGNIFICANT CHANGE UP (ref 150–400)
PLATELET # BLD AUTO: 189 K/UL — SIGNIFICANT CHANGE UP (ref 150–400)
POTASSIUM SERPL-MCNC: 4.2 MMOL/L — SIGNIFICANT CHANGE UP (ref 3.5–5.3)
POTASSIUM SERPL-SCNC: 4.2 MMOL/L — SIGNIFICANT CHANGE UP (ref 3.5–5.3)
RBC # BLD: 2.43 M/UL — LOW (ref 4.2–5.8)
RBC # BLD: 2.46 M/UL — LOW (ref 4.2–5.8)
RBC # FLD: 15.9 % — HIGH (ref 10.3–14.5)
RBC # FLD: 18 % — HIGH (ref 10.3–14.5)
SODIUM SERPL-SCNC: 138 MMOL/L — SIGNIFICANT CHANGE UP (ref 135–145)
WBC # BLD: 5.88 K/UL — SIGNIFICANT CHANGE UP (ref 3.8–10.5)
WBC # BLD: 7.5 K/UL — SIGNIFICANT CHANGE UP (ref 3.8–10.5)
WBC # FLD AUTO: 5.88 K/UL — SIGNIFICANT CHANGE UP (ref 3.8–10.5)
WBC # FLD AUTO: 7.5 K/UL — SIGNIFICANT CHANGE UP (ref 3.8–10.5)

## 2018-04-27 PROCEDURE — 76705 ECHO EXAM OF ABDOMEN: CPT | Mod: 26,RT

## 2018-04-27 PROCEDURE — 99232 SBSQ HOSP IP/OBS MODERATE 35: CPT

## 2018-04-27 RX ORDER — ALPRAZOLAM 0.25 MG
0.25 TABLET ORAL ONCE
Qty: 0 | Refills: 0 | Status: DISCONTINUED | OUTPATIENT
Start: 2018-04-27 | End: 2018-04-27

## 2018-04-27 RX ORDER — MIDODRINE HYDROCHLORIDE 2.5 MG/1
10 TABLET ORAL THREE TIMES A DAY
Qty: 0 | Refills: 0 | Status: DISCONTINUED | OUTPATIENT
Start: 2018-04-27 | End: 2018-04-27

## 2018-04-27 RX ORDER — HEPARIN SODIUM 5000 [USP'U]/ML
5000 INJECTION INTRAVENOUS; SUBCUTANEOUS EVERY 12 HOURS
Qty: 0 | Refills: 0 | Status: DISCONTINUED | OUTPATIENT
Start: 2018-04-27 | End: 2018-05-03

## 2018-04-27 RX ORDER — FINASTERIDE 5 MG/1
5 TABLET, FILM COATED ORAL AT BEDTIME
Qty: 0 | Refills: 0 | Status: DISCONTINUED | OUTPATIENT
Start: 2018-04-27 | End: 2018-05-03

## 2018-04-27 RX ORDER — MIDODRINE HYDROCHLORIDE 2.5 MG/1
10 TABLET ORAL
Qty: 0 | Refills: 0 | Status: DISCONTINUED | OUTPATIENT
Start: 2018-04-27 | End: 2018-05-03

## 2018-04-27 RX ADMIN — Medication 1 SPRAY(S): at 23:09

## 2018-04-27 RX ADMIN — FLUDROCORTISONE ACETATE 0.1 MILLIGRAM(S): 0.1 TABLET ORAL at 17:45

## 2018-04-27 RX ADMIN — MIDODRINE HYDROCHLORIDE 10 MILLIGRAM(S): 2.5 TABLET ORAL at 16:43

## 2018-04-27 RX ADMIN — BUDESONIDE AND FORMOTEROL FUMARATE DIHYDRATE 2 PUFF(S): 160; 4.5 AEROSOL RESPIRATORY (INHALATION) at 06:13

## 2018-04-27 RX ADMIN — Medication 200 MILLIGRAM(S): at 17:45

## 2018-04-27 RX ADMIN — FLUDROCORTISONE ACETATE 0.1 MILLIGRAM(S): 0.1 TABLET ORAL at 06:12

## 2018-04-27 RX ADMIN — Medication 1 SPRAY(S): at 06:13

## 2018-04-27 RX ADMIN — CLOPIDOGREL BISULFATE 75 MILLIGRAM(S): 75 TABLET, FILM COATED ORAL at 16:44

## 2018-04-27 RX ADMIN — Medication 0.5 MILLIGRAM(S): at 06:12

## 2018-04-27 RX ADMIN — BUDESONIDE AND FORMOTEROL FUMARATE DIHYDRATE 2 PUFF(S): 160; 4.5 AEROSOL RESPIRATORY (INHALATION) at 17:45

## 2018-04-27 RX ADMIN — MONTELUKAST 10 MILLIGRAM(S): 4 TABLET, CHEWABLE ORAL at 16:45

## 2018-04-27 RX ADMIN — FINASTERIDE 5 MILLIGRAM(S): 5 TABLET, FILM COATED ORAL at 21:20

## 2018-04-27 RX ADMIN — Medication 10 MILLIGRAM(S): at 06:13

## 2018-04-27 RX ADMIN — PREGABALIN 500 MICROGRAM(S): 225 CAPSULE ORAL at 16:44

## 2018-04-27 RX ADMIN — Medication 0.5 MILLIGRAM(S): at 17:45

## 2018-04-27 RX ADMIN — HEPARIN SODIUM 5000 UNIT(S): 5000 INJECTION INTRAVENOUS; SUBCUTANEOUS at 17:45

## 2018-04-27 RX ADMIN — MIDODRINE HYDROCHLORIDE 5 MILLIGRAM(S): 2.5 TABLET ORAL at 06:12

## 2018-04-27 RX ADMIN — HEPARIN SODIUM 5000 UNIT(S): 5000 INJECTION INTRAVENOUS; SUBCUTANEOUS at 06:13

## 2018-04-27 RX ADMIN — Medication 1 SPRAY(S): at 17:46

## 2018-04-27 RX ADMIN — Medication 0.25 MILLIGRAM(S): at 01:02

## 2018-04-27 RX ADMIN — TIOTROPIUM BROMIDE 1 CAPSULE(S): 18 CAPSULE ORAL; RESPIRATORY (INHALATION) at 12:13

## 2018-04-27 RX ADMIN — Medication 1 APPLICATION(S): at 17:45

## 2018-04-27 RX ADMIN — Medication 1 MILLIGRAM(S): at 16:45

## 2018-04-27 RX ADMIN — Medication 81 MILLIGRAM(S): at 16:43

## 2018-04-27 RX ADMIN — Medication 200 MILLIGRAM(S): at 06:13

## 2018-04-27 RX ADMIN — Medication 100 MILLIGRAM(S): at 16:45

## 2018-04-27 RX ADMIN — Medication 1 APPLICATION(S): at 06:13

## 2018-04-27 RX ADMIN — Medication 1 SPRAY(S): at 12:13

## 2018-04-27 NOTE — PROGRESS NOTE ADULT - SUBJECTIVE AND OBJECTIVE BOX
Chief Complaint: 82 y/o Esophageal Ca. CAD recent admission with stent placement admitted with fatigue, hypotension, hypoglycemia, and hypothermia. Patient on chronic steroids for RA.    Patient transfused yesterday for Hb. 7.5.  PO intake better, still with low GFS at am.  Salt diet liberalized On Florinef- will take 4-7 days to increase plasma volume.  Midodrine increased to 10 mg TID, changed to pre-meals.  Was still hypotensive when PT eval attempted today.    MEDICATIONS  (STANDING):  ALPRAZolam 0.5 milliGRAM(s) Oral two times a day  aspirin enteric coated 81 milliGRAM(s) Oral daily  BACItracin   Ointment 1 Application(s) Topical two times a day  buDESOnide  80 MICROgram(s)/formoterol 4.5 MICROgram(s) Inhaler 2 Puff(s) Inhalation two times a day  clopidogrel Tablet 75 milliGRAM(s) Oral daily  cyanocobalamin 500 MICROGram(s) Oral daily  finasteride 5 milliGRAM(s) Oral at bedtime  fludroCORTISONE 0.1 milliGRAM(s) Oral two times a day  folic acid 1 milliGRAM(s) Oral daily  heparin  Injectable 5000 Unit(s) SubCutaneous every 12 hours  midodrine 10 milliGRAM(s) Oral <User Schedule>  montelukast 10 milliGRAM(s) Oral daily  phenytoin   Capsule 200 milliGRAM(s) Oral two times a day  predniSONE   Tablet 10 milliGRAM(s) Oral daily  pyridoxine 100 milliGRAM(s) Oral daily  sodium chloride 0.65% Nasal 1 Spray(s) Both Nostrils four times a day  tiotropium 18 MICROgram(s) Capsule 1 Capsule(s) Inhalation daily    MEDICATIONS  (PRN):  acetaminophen   Tablet 650 milliGRAM(s) Oral every 6 hours PRN For Temp greater than 38 C (100.4 F)  ALBUTerol/ipratropium for Nebulization 3 milliLiter(s) Nebulizer every 6 hours PRN Shortness of Breath and/or Wheezing      Allergies    penicillin (Rash)    Intolerances        PHYSICAL EXAM:  VITALS: T(C): 36.6 (04-27-18 @ 16:17)  T(F): 97.9 (04-27-18 @ 16:17), Max: 98 (04-27-18 @ 04:31)  HR: 73 (04-27-18 @ 04:31) (71 - 73)  BP: 92/54 (04-27-18 @ 04:47) (92/54 - 94/50)  RR:  (18 - 18)  SpO2:  (95% - 96%)  GENERAL: NAD, alert, in chair.  EYES: No proptosis,  HEENT:  Atraumatic, Normocephalic,   RESPIRATORY: Clear to auscultation bilaterally  CARDIOVASCULAR: Regular rhythm; No murmurs; no peripheral edema  GI: Soft, nontender, non distended, normal bowel sounds  SKIN: Dry, intact, No rashes or lesions, chronic left leg edema.  MUSCULOSKELETAL: decreased strength  NEURO: extraocular movements intact, no tremor, reduced reflexes  PSYCH: Alert and oriented x 3, normal affect, normal mood                                8.4    7.5   )-----------( 189      ( 27 Apr 2018 17:05 )             23.8       04-27    138  |  103  |  20  ----------------------------<  63<L>  4.2   |  29  |  0.64    EGFR if : 106  EGFR if non : 91    Ca    8.7      04-27  Mg     2.0     04-27        Thyroid Function Tests:  04-24 @ 07:51 TSH 7.80 FreeT4 0.6 T3 44 Anti TPO -- Anti Thyroglobulin Ab -- TSI --  04-21 @ 07:04 TSH 10.24 FreeT4 -- T3 -- Anti TPO -- Anti Thyroglobulin Ab -- TSI --

## 2018-04-27 NOTE — PROGRESS NOTE ADULT - SUBJECTIVE AND OBJECTIVE BOX
INTERVAL HPI/OVERNIGHT EVENTS:  Pt seen and examined at bedside.     Allergies/Intolerance: penicillin (Rash)      MEDICATIONS  (STANDING):  ALPRAZolam 0.5 milliGRAM(s) Oral two times a day  aspirin enteric coated 81 milliGRAM(s) Oral daily  BACItracin   Ointment 1 Application(s) Topical two times a day  buDESOnide  80 MICROgram(s)/formoterol 4.5 MICROgram(s) Inhaler 2 Puff(s) Inhalation two times a day  clopidogrel Tablet 75 milliGRAM(s) Oral daily  cyanocobalamin 500 MICROGram(s) Oral daily  finasteride 5 milliGRAM(s) Oral at bedtime  fludroCORTISONE 0.1 milliGRAM(s) Oral two times a day  folic acid 1 milliGRAM(s) Oral daily  heparin  Injectable 5000 Unit(s) SubCutaneous every 12 hours  midodrine 10 milliGRAM(s) Oral three times a day  montelukast 10 milliGRAM(s) Oral daily  phenytoin   Capsule 200 milliGRAM(s) Oral two times a day  predniSONE   Tablet 10 milliGRAM(s) Oral daily  pyridoxine 100 milliGRAM(s) Oral daily  sodium chloride 0.65% Nasal 1 Spray(s) Both Nostrils four times a day  tiotropium 18 MICROgram(s) Capsule 1 Capsule(s) Inhalation daily    MEDICATIONS  (PRN):  acetaminophen   Tablet 650 milliGRAM(s) Oral every 6 hours PRN For Temp greater than 38 C (100.4 F)  ALBUTerol/ipratropium for Nebulization 3 milliLiter(s) Nebulizer every 6 hours PRN Shortness of Breath and/or Wheezing        ROS: all systems reviewed and wnl      PHYSICAL EXAMINATION:  Vital Signs Last 24 Hrs  T(C): 36.6 (27 Apr 2018 16:17), Max: 36.7 (26 Apr 2018 18:00)  T(F): 97.9 (27 Apr 2018 16:17), Max: 98 (26 Apr 2018 18:00)  HR: 73 (27 Apr 2018 04:31) (71 - 76)  BP: 92/54 (27 Apr 2018 04:47) (92/54 - 110/56)  BP(mean): --  RR: 18 (27 Apr 2018 04:31) (18 - 18)  SpO2: 96% (27 Apr 2018 04:31) (94% - 96%)  CAPILLARY BLOOD GLUCOSE          04-26 @ 07:01  -  04-27 @ 07:00  --------------------------------------------------------  IN: 480 mL / OUT: 300 mL / NET: 180 mL        GENERAL:   NECK: supple, No JVD  CHEST/LUNG: clear to auscultation bilaterally; no rales, rhonchi, or wheezing b/l  HEART: normal S1, S2  ABDOMEN: BS+, soft, ND, NT   EXTREMITIES:  pulses palpable; no clubbing, cyanosis, or edema b/l LEs  SKIN: no rashes or lesions      LABS:                        7.5    5.88  )-----------( 174      ( 27 Apr 2018 10:09 )             23.3     04-27    138  |  103  |  20  ----------------------------<  63<L>  4.2   |  29  |  0.64    Ca    8.7      27 Apr 2018 07:17  Mg     2.0     04-27 INTERVAL HPI/OVERNIGHT EVENTS:  Pt seen and examined at bedside.     Allergies/Intolerance: penicillin (Rash)      MEDICATIONS  (STANDING):  ALPRAZolam 0.5 milliGRAM(s) Oral two times a day  aspirin enteric coated 81 milliGRAM(s) Oral daily  BACItracin   Ointment 1 Application(s) Topical two times a day  buDESOnide  80 MICROgram(s)/formoterol 4.5 MICROgram(s) Inhaler 2 Puff(s) Inhalation two times a day  clopidogrel Tablet 75 milliGRAM(s) Oral daily  cyanocobalamin 500 MICROGram(s) Oral daily  finasteride 5 milliGRAM(s) Oral at bedtime  fludroCORTISONE 0.1 milliGRAM(s) Oral two times a day  folic acid 1 milliGRAM(s) Oral daily  heparin  Injectable 5000 Unit(s) SubCutaneous every 12 hours  midodrine 10 milliGRAM(s) Oral three times a day  montelukast 10 milliGRAM(s) Oral daily  phenytoin   Capsule 200 milliGRAM(s) Oral two times a day  predniSONE   Tablet 10 milliGRAM(s) Oral daily  pyridoxine 100 milliGRAM(s) Oral daily  sodium chloride 0.65% Nasal 1 Spray(s) Both Nostrils four times a day  tiotropium 18 MICROgram(s) Capsule 1 Capsule(s) Inhalation daily    MEDICATIONS  (PRN):  acetaminophen   Tablet 650 milliGRAM(s) Oral every 6 hours PRN For Temp greater than 38 C (100.4 F)  ALBUTerol/ipratropium for Nebulization 3 milliLiter(s) Nebulizer every 6 hours PRN Shortness of Breath and/or Wheezing        ROS: all systems reviewed and wnl      PHYSICAL EXAMINATION:  Vital Signs Last 24 Hrs  T(C): 36.6 (27 Apr 2018 16:17), Max: 36.7 (26 Apr 2018 18:00)  T(F): 97.9 (27 Apr 2018 16:17), Max: 98 (26 Apr 2018 18:00)  HR: 73 (27 Apr 2018 04:31) (71 - 76)  BP: 92/54 (27 Apr 2018 04:47) (92/54 - 110/56)  BP(mean): --  RR: 18 (27 Apr 2018 04:31) (18 - 18)  SpO2: 96% (27 Apr 2018 04:31) (94% - 96%)  CAPILLARY BLOOD GLUCOSE          04-26 @ 07:01  -  04-27 @ 07:00  --------------------------------------------------------  IN: 480 mL / OUT: 300 mL / NET: 180 mL        GENERAL: comfortable in chair, no SOB, fevers, family at bedside  NECK: supple, No JVD  CHEST/LUNG: clear to auscultation bilaterally; no rales, rhonchi, or wheezing b/l  HEART: normal S1, S2  ABDOMEN: BS+, soft, ND, NT   EXTREMITIES:  pulses palpable; no clubbing, cyanosis, or edema b/l LEs  SKIN: no rashes or lesions      LABS:                        7.5    5.88  )-----------( 174      ( 27 Apr 2018 10:09 )             23.3     04-27    138  |  103  |  20  ----------------------------<  63<L>  4.2   |  29  |  0.64    Ca    8.7      27 Apr 2018 07:17  Mg     2.0     04-27

## 2018-04-27 NOTE — PROGRESS NOTE ADULT - SUBJECTIVE AND OBJECTIVE BOX
INTERVAL HPI/OVERNIGHT EVENTS: found to have nose bleed, no GI bleeding, comfortable    MEDICATIONS  (STANDING):  ALPRAZolam 0.5 milliGRAM(s) Oral two times a day  aspirin enteric coated 81 milliGRAM(s) Oral daily  BACItracin   Ointment 1 Application(s) Topical two times a day  buDESOnide  80 MICROgram(s)/formoterol 4.5 MICROgram(s) Inhaler 2 Puff(s) Inhalation two times a day  clopidogrel Tablet 75 milliGRAM(s) Oral daily  cyanocobalamin 500 MICROGram(s) Oral daily  finasteride 5 milliGRAM(s) Oral daily  fludroCORTISONE 0.1 milliGRAM(s) Oral two times a day  folic acid 1 milliGRAM(s) Oral daily  heparin  Injectable 5000 Unit(s) SubCutaneous every 8 hours  midodrine 5 milliGRAM(s) Oral every 8 hours  montelukast 10 milliGRAM(s) Oral daily  phenytoin   Capsule 200 milliGRAM(s) Oral two times a day  predniSONE   Tablet 10 milliGRAM(s) Oral daily  pyridoxine 100 milliGRAM(s) Oral daily  sodium chloride 0.65% Nasal 1 Spray(s) Both Nostrils four times a day  tiotropium 18 MICROgram(s) Capsule 1 Capsule(s) Inhalation daily    MEDICATIONS  (PRN):  acetaminophen   Tablet 650 milliGRAM(s) Oral every 6 hours PRN For Temp greater than 38 C (100.4 F)  ALBUTerol/ipratropium for Nebulization 3 milliLiter(s) Nebulizer every 6 hours PRN Shortness of Breath and/or Wheezing      Allergies    penicillin (Rash)    Intolerances            PHYSICAL EXAM:   Vital Signs:  Vital Signs Last 24 Hrs  T(C): 36.7 (2018 04:31), Max: 36.7 (2018 18:00)  T(F): 98 (2018 04:31), Max: 98 (2018 18:00)  HR: 73 (2018 04:31) (68 - 76)  BP: 92/54 (2018 04:47) (83/33 - 110/56)  BP(mean): --  RR: 18 (2018 04:31) (18 - 18)  SpO2: 96% (2018 04:31) (92% - 96%)  Daily     Daily Weight in k.4 (2018 05:48)    GENERAL:  no distress  HEENT:  NC/AT,  anicteric  CHEST:   no increased effort, breath sounds clear  HEART:  Regular rhythm  ABDOMEN:  Soft, non-tender, non-distended, normoactive bowel sounds,  no masses ,no hepato-splenomegaly, no signs of chronic liver disease  EXTEREMITIES:  no cyanosis      LABS:                        8.2    7.3   )-----------( 228      ( 2018 10:29 )             24.6         138  |  103  |  20  ----------------------------<  63<L>  4.2   |  29  |  0.64    Ca    8.7      2018 07:17  Mg     2.0                 RADIOLOGY & ADDITIONAL TESTS:

## 2018-04-27 NOTE — PROGRESS NOTE ADULT - ASSESSMENT
82 y/o Esophageal Ca. CAD recent admission with stent placement admitted with fatigue, hypotension, hypoglycemia, and hypothermia. Patient on chronic steroids for RA.    Patient says that in the past year he lost 45 lbs. following a diagnosis of esophageal Ca, adjuvant chemo/ RT and surgery 8/17. He had multiple stents placed during this time, recently 4/17. After discharge he was compliant with prednisone 5 mg daily, no fever, no chills, but noted worsening fatigue at night. He was admitted with complaints of chest pain, however on admission he was noted with hypothermia, hypotension and hypoglycemia. All resolved with stress dose steroids. He was started on epimeric antibiotics, but no clear source of infection noted.   Symptoms on admission resolved with steroids, except low BP- patient is off his BP meds, started on midodrine.  Still on high dose hydrocortisone.    TFT's abnormality mostly due to treatment with dilantin and recent iodine dye- no evidence for pituitary disease: Normal prolactin, Increased T3 uptake, dilantin displaces thyroid hormones from binding proteins.    Patient transfused yesterday for Hb. 7.5.  PO intake better, still with low GFS at am.  Salt diet liberalized On Florinef- will take 4-7 days to increase plasma volume.  Midodrine increased to 10 mg TID, changed to pre-meals.  Was still hypotensive when PT eval attempted today.    Suggest:   Keep prednisone 10 mg X3 days, then lower to 5 mg.  On full dose florinef, midodrine, added salt diet.  Supportive care: Compressive stockings, nutritional support- added protein, Weight patient Q2days.

## 2018-04-27 NOTE — PROGRESS NOTE ADULT - SUBJECTIVE AND OBJECTIVE BOX
CHIEF COMPLAINT:Patient is a 82y old  Male who presents with a chief complaint of hypotension, hypothermia (23 Apr 2018 11:28)  pt with orthostasis.    HPI:  82 y/o M former smoker w/ hx of HTN, TIA, Seizures, RA, CAD s/p 6 stents most recently 4/17/18, Esophageal CA s/p chemo/RT and esophagogastrectomy recent admission for cardiac cath now presenting for altered mental status at home. As per patient and family he was discharged 1 day prior to admission and was doing well until later in the day when his wife noticed he became more lethargic and had difficulty answering questions. He also noticed that he was having substernal left sided chest pressure radiating down his left arm which lasted for a few minutes. HE was concerned given the recent stent that this was another cardiac event and decided to come into the hospital. He denied any fevers/chills,nausea/vomiting,palpiotations,sweating,abdominal pain,diarrhea,melena. PT was recently hypotensive during previous admission and was started on hydrocortisone. PT was trnasitioned from hydrocoritosne to prednisone 20mg     In the ED VS T:92.7 BP:98/50 P:80 RR:20 O2:100% on 2L. PT was given vanc 1g, 1LNS bolus, aztroenam 1g and hydrocortisone 100mg IV (21 Apr 2018 10:47)      PAST MEDICAL & SURGICAL HISTORY:  Coronary artery disease: s/p 3 stents on June 30, 2017 at [place] Dr. Lavelle Reid  OA (osteoarthritis) of knee: right  Former smoker, stopped smoking in distant past  Rheumatoid arthritis  Seizure disorder: 1 episode approximately 15 yrs ago  Asthma  Hyperlipidemia  BPH (benign prostatic hyperplasia)  HTN (hypertension)  Esophagus cancer  Chronic allergic rhinitis  BCC (basal cell carcinoma): skin  Cerebrovascular accident (CVA)  Anxiety  Anastomotic leak following esophagectomy: Goree logan esophagectomy  History of tonsillectomy  H/O heart artery stent: June 30, 2017  Knee arthropathy: left  History of total hip replacement: left  History of hernia repair      MEDICATIONS  (STANDING):  ALPRAZolam 0.5 milliGRAM(s) Oral two times a day  aspirin enteric coated 81 milliGRAM(s) Oral daily  BACItracin   Ointment 1 Application(s) Topical two times a day  buDESOnide  80 MICROgram(s)/formoterol 4.5 MICROgram(s) Inhaler 2 Puff(s) Inhalation two times a day  clopidogrel Tablet 75 milliGRAM(s) Oral daily  cyanocobalamin 500 MICROGram(s) Oral daily  finasteride 5 milliGRAM(s) Oral daily  fludroCORTISONE 0.1 milliGRAM(s) Oral two times a day  folic acid 1 milliGRAM(s) Oral daily  heparin  Injectable 5000 Unit(s) SubCutaneous every 8 hours  midodrine 5 milliGRAM(s) Oral every 8 hours  montelukast 10 milliGRAM(s) Oral daily  phenytoin   Capsule 200 milliGRAM(s) Oral two times a day  predniSONE   Tablet 10 milliGRAM(s) Oral daily  pyridoxine 100 milliGRAM(s) Oral daily  sodium chloride 0.65% Nasal 1 Spray(s) Both Nostrils four times a day  tiotropium 18 MICROgram(s) Capsule 1 Capsule(s) Inhalation daily    MEDICATIONS  (PRN):  acetaminophen   Tablet 650 milliGRAM(s) Oral every 6 hours PRN For Temp greater than 38 C (100.4 F)  ALBUTerol/ipratropium for Nebulization 3 milliLiter(s) Nebulizer every 6 hours PRN Shortness of Breath and/or Wheezing      FAMILY HISTORY:  Family history of heart attack  Family history of pulmonary embolism: father @ 49 yr old  FH: CAD (coronary artery disease) (Sibling)      SOCIAL HISTORY:    [ ] Non-smoker  [ ] Smoker  [ ] Alcohol    Allergies    penicillin (Rash)    Intolerances    	    REVIEW OF SYSTEMS:  CONSTITUTIONAL: No fever, weight loss, or fatigue  EYES: No eye pain, visual disturbances, or discharge  ENT:  No difficulty hearing, tinnitus, vertigo; No sinus or throat pain  NECK: No pain or stiffness  RESPIRATORY: No cough, wheezing, chills or hemoptysis; No Shortness of Breath  CARDIOVASCULAR: No chest pain, palpitations, passing out, dizziness, or leg swelling  GASTROINTESTINAL: No abdominal or epigastric pain. No nausea, vomiting, or hematemesis; No diarrhea or constipation. No melena or hematochezia.  GENITOURINARY: No dysuria, frequency, hematuria, or incontinence  NEUROLOGICAL: No headaches, memory loss, loss of strength, numbness, or tremors  SKIN: No itching, burning, rashes, or lesions   LYMPH Nodes: No enlarged glands  ENDOCRINE: No heat or cold intolerance; No hair loss  MUSCULOSKELETAL: No joint pain or swelling; No muscle, back, or extremity pain  PSYCHIATRIC: No depression, anxiety, mood swings, or difficulty sleeping  HEME/LYMPH: No easy bruising, or bleeding gums  ALLERGY AND IMMUNOLOGIC: No hives or eczema	    [ ] All others negative	  [ ] Unable to obtain    PHYSICAL EXAM:  T(C): 36.7 (04-27-18 @ 04:31), Max: 36.7 (04-26-18 @ 18:00)  HR: 73 (04-27-18 @ 04:31) (71 - 76)  BP: 92/54 (04-27-18 @ 04:47) (83/33 - 110/56)  RR: 18 (04-27-18 @ 04:31) (18 - 18)  SpO2: 96% (04-27-18 @ 04:31) (92% - 96%)  Wt(kg): --  I&O's Summary    26 Apr 2018 07:01  -  27 Apr 2018 07:00  --------------------------------------------------------  IN: 480 mL / OUT: 300 mL / NET: 180 mL        Appearance: Normal	  HEENT:   Normal oral mucosa, PERRL, EOMI	  Lymphatic: No lymphadenopathy  Cardiovascular: Normal S1 S2, No JVD, No murmurs, No edema  Respiratory: Lungs clear to auscultation	  Psychiatry: A & O x 3, Mood & affect appropriate  Gastrointestinal:  Soft, Non-tender, + BS	  Skin: No rashes, No ecchymoses, No cyanosis	  Neurologic: Non-focal  Extremities: Normal range of motion, No clubbing, cyanosis or edema  Vascular: Peripheral pulses palpable 2+ bilaterally    TELEMETRY: 	    ECG:  	  RADIOLOGY:  OTHER: 	  	  LABS:	 	    CARDIAC MARKERS:                              7.5    5.88  )-----------( 174      ( 27 Apr 2018 10:09 )             23.3     04-27    138  |  103  |  20  ----------------------------<  63<L>  4.2   |  29  |  0.64    Ca    8.7      27 Apr 2018 07:17  Mg     2.0     04-27      proBNP:   Lipid Profile:   HgA1c:   TSH:       PREVIOUS DIAGNOSTIC TESTING:    [ ] Echocardiogram:  [ ]  Catheterization:  [ ] Stress Test:

## 2018-04-27 NOTE — PROGRESS NOTE ADULT - SUBJECTIVE AND OBJECTIVE BOX
CHIEF COMPLAINT:    Interval Events:    REVIEW OF SYSTEMS:  Constitutional: No fevers or chills. No weight loss. No fatigue or generalized malaise.  Eyes: No itching or discharge from the eyes  ENT: No ear pain. No ear discharge. No nasal congestion. No post nasal drip. No epistaxis. No throat pain. No sore throat. No difficulty swallowing.   CV: No chest pain. No palpitations. No lightheadedness or dizziness.   Resp: No dyspnea at rest. No dyspnea on exertion. No orthopnea. No wheezing. No cough. No stridor. No sputum production. No chest pain with respiration.  GI: No nausea. No vomiting. No diarrhea.  MSK: No joint pain or pain in any extremities  Integumentary: No skin lesions. No pedal edema.  Neurological: No gross motor weakness. No sensory changes.  [ ] All other systems negative  [ ] Unable to assess ROS because ________    OBJECTIVE:  ICU Vital Signs Last 24 Hrs  T(C): 36.7 (27 Apr 2018 04:31), Max: 36.7 (26 Apr 2018 18:00)  T(F): 98 (27 Apr 2018 04:31), Max: 98 (26 Apr 2018 18:00)  HR: 73 (27 Apr 2018 04:31) (68 - 76)  BP: 92/54 (27 Apr 2018 04:47) (83/33 - 110/56)  BP(mean): --  ABP: --  ABP(mean): --  RR: 18 (27 Apr 2018 04:31) (18 - 18)  SpO2: 96% (27 Apr 2018 04:31) (92% - 96%)        04-25 @ 07:01 - 04-26 @ 07:00  --------------------------------------------------------  IN: 480 mL / OUT: 700 mL / NET: -220 mL    04-26 @ 07:01  -  04-27 @ 05:06  --------------------------------------------------------  IN: 480 mL / OUT: 300 mL / NET: 180 mL      CAPILLARY BLOOD GLUCOSE          PHYSICAL EXAM:  General: Awake, alert, oriented X 3.   HEENT: Atraumatic, normocephalic.                 Mallampatti Grade                 No nasal congestion.                No tonsillar or pharyngeal exudates.  Lymph Nodes: No palpable lymphadenopathy  Neck: No JVD. No carotid bruit.   Respiratory: Normal chest expansion                         Normal percussion                         Normal and equal air entry                         No wheeze, rhonchi or rales.  Cardiovascular: S1 S2 normal. No murmurs, rubs or gallops.   Abdomen: Soft, non-tender, non-distended. No organomegaly.  Extremities: Warm to touch. Peripheral pulse palpable. No pedal edema.   Skin: No rashes or skin lesions  Neurological: Motor and sensory examination equal and normal in all four extremities.  Psychiatry: Appropriate mood and affect.    HOSPITAL MEDICATIONS:  MEDICATIONS  (STANDING):  ALPRAZolam 0.5 milliGRAM(s) Oral two times a day  aspirin enteric coated 81 milliGRAM(s) Oral daily  BACItracin   Ointment 1 Application(s) Topical two times a day  buDESOnide  80 MICROgram(s)/formoterol 4.5 MICROgram(s) Inhaler 2 Puff(s) Inhalation two times a day  clopidogrel Tablet 75 milliGRAM(s) Oral daily  cyanocobalamin 500 MICROGram(s) Oral daily  finasteride 5 milliGRAM(s) Oral daily  fludroCORTISONE 0.1 milliGRAM(s) Oral two times a day  folic acid 1 milliGRAM(s) Oral daily  heparin  Injectable 5000 Unit(s) SubCutaneous every 8 hours  midodrine 5 milliGRAM(s) Oral every 8 hours  montelukast 10 milliGRAM(s) Oral daily  phenytoin   Capsule 200 milliGRAM(s) Oral two times a day  predniSONE   Tablet 10 milliGRAM(s) Oral daily  pyridoxine 100 milliGRAM(s) Oral daily  sodium chloride 0.65% Nasal 1 Spray(s) Both Nostrils four times a day  tiotropium 18 MICROgram(s) Capsule 1 Capsule(s) Inhalation daily    MEDICATIONS  (PRN):  acetaminophen   Tablet 650 milliGRAM(s) Oral every 6 hours PRN For Temp greater than 38 C (100.4 F)  ALBUTerol/ipratropium for Nebulization 3 milliLiter(s) Nebulizer every 6 hours PRN Shortness of Breath and/or Wheezing      LABS:                        8.2    7.3   )-----------( 228      ( 26 Apr 2018 10:29 )             24.6     04-26    137  |  103  |  17  ----------------------------<  84  4.1   |  28  |  0.61    Ca    8.8      26 Apr 2018 06:51  Mg     2.0     04-26                MICROBIOLOGY:     RADIOLOGY:  [ ] Reviewed and interpreted by me    Point of Care Ultrasound Findings:    PFT:    EKG: CHIEF COMPLAINT: not feeling bad--cough up blood (epistaxis)    Interval Events: ENT evaluation    REVIEW OF SYSTEMS:  Constitutional: No fevers or chills. No weight loss. + fatigue or generalized malaise.  Eyes: No itching or discharge from the eyes  ENT: No ear pain. No ear discharge. No nasal congestion. No post nasal drip. No epistaxis. No throat pain. No sore throat. No difficulty swallowing.   CV: No chest pain. No palpitations. No lightheadedness or dizziness.   Resp: No dyspnea at rest. No dyspnea on exertion. No orthopnea. No wheezing. + cough. No stridor. No sputum production. No chest pain with respiration.  GI: No nausea. No vomiting. No diarrhea.  MSK: No joint pain or pain in any extremities  Integumentary: No skin lesions. No pedal edema.  Neurological: No gross motor weakness. No sensory changes.  [+ ] All other systems negative  [ ] Unable to assess ROS because ________    OBJECTIVE:  ICU Vital Signs Last 24 Hrs  T(C): 36.7 (27 Apr 2018 04:31), Max: 36.7 (26 Apr 2018 18:00)  T(F): 98 (27 Apr 2018 04:31), Max: 98 (26 Apr 2018 18:00)  HR: 73 (27 Apr 2018 04:31) (68 - 76)  BP: 92/54 (27 Apr 2018 04:47) (83/33 - 110/56)  BP(mean): --  ABP: --  ABP(mean): --  RR: 18 (27 Apr 2018 04:31) (18 - 18)  SpO2: 96% (27 Apr 2018 04:31) (92% - 96%)        04-25 @ 07:01  -  04-26 @ 07:00  --------------------------------------------------------  IN: 480 mL / OUT: 700 mL / NET: -220 mL    04-26 @ 07:01 - 04-27 @ 05:06  --------------------------------------------------------  IN: 480 mL / OUT: 300 mL / NET: 180 mL      CAPILLARY BLOOD GLUCOSE          PHYSICAL EXAM: NAD in bed  General: Awake, alert, oriented X 3.   HEENT: Atraumatic, normocephalic.                 Mallampatti Grade 3                No nasal congestion.                No tonsillar or pharyngeal exudates.  Lymph Nodes: No palpable lymphadenopathy  Neck: No JVD. No carotid bruit.   Respiratory: Normal chest expansion                         Normal percussion                         Normal and equal air entry                         No wheeze, rhonchi or rales.  Cardiovascular: S1 S2 normal. + murmurs, rubs or gallops.   Abdomen: Soft, non-tender, non-distended. No organomegaly.  Extremities: Warm to touch. Peripheral pulse palpable. + pedal edema.   Skin: No rashes or skin lesions  Neurological: Motor and sensory examination equal and normal in all four extremities.  Psychiatry: Appropriate mood and affect.    HOSPITAL MEDICATIONS:  MEDICATIONS  (STANDING):  ALPRAZolam 0.5 milliGRAM(s) Oral two times a day  aspirin enteric coated 81 milliGRAM(s) Oral daily  BACItracin   Ointment 1 Application(s) Topical two times a day  buDESOnide  80 MICROgram(s)/formoterol 4.5 MICROgram(s) Inhaler 2 Puff(s) Inhalation two times a day  clopidogrel Tablet 75 milliGRAM(s) Oral daily  cyanocobalamin 500 MICROGram(s) Oral daily  finasteride 5 milliGRAM(s) Oral daily  fludroCORTISONE 0.1 milliGRAM(s) Oral two times a day  folic acid 1 milliGRAM(s) Oral daily  heparin  Injectable 5000 Unit(s) SubCutaneous every 8 hours  midodrine 5 milliGRAM(s) Oral every 8 hours  montelukast 10 milliGRAM(s) Oral daily  phenytoin   Capsule 200 milliGRAM(s) Oral two times a day  predniSONE   Tablet 10 milliGRAM(s) Oral daily  pyridoxine 100 milliGRAM(s) Oral daily  sodium chloride 0.65% Nasal 1 Spray(s) Both Nostrils four times a day  tiotropium 18 MICROgram(s) Capsule 1 Capsule(s) Inhalation daily    MEDICATIONS  (PRN):  acetaminophen   Tablet 650 milliGRAM(s) Oral every 6 hours PRN For Temp greater than 38 C (100.4 F)  ALBUTerol/ipratropium for Nebulization 3 milliLiter(s) Nebulizer every 6 hours PRN Shortness of Breath and/or Wheezing      LABS:                        8.2    7.3   )-----------( 228      ( 26 Apr 2018 10:29 )             24.6     04-26    137  |  103  |  17  ----------------------------<  84  4.1   |  28  |  0.61    Ca    8.8      26 Apr 2018 06:51  Mg     2.0     04-26                MICROBIOLOGY:     RADIOLOGY:  < from: CT Chest No Cont (04.26.18 @ 12:59) >  LUNGS AND LARGE AIRWAYS: Patent central airways with small amount of   debris in the right mainstem bronchus; no bronchiectasis. Nodular   opacities in the lower lungs bilaterally, right greater than left, with   scattered linear subsegmental atelectasis. Mild centrilobular emphysema.    PLEURA: Small pleural effusions bilaterally with partial loculation of   the right pleural effusion. There are mildly decreased compared to   12/7/2017.    VESSELS: Left-sided aortic arch with left-sided descending thoracic   aorta. Normal caliber of the aorta and main pulmonary artery.   Atheromatous disease of the aorta    HEART: Heart size is normal. No pericardial effusion. Aortic valve and   mitral annual calcifications. Coronary artery calcifications and coronary   artery stents.    MEDIASTINUM AND LAURA: No lymphadenopathy.    CHEST WALL AND LOWER NECK: 0.6 cm hypodense lesion in the left thyroid   lobe.    VISUALIZED UPPER ABDOMEN: Status post esophagectomy with gastric pull-up.   Right renal cysts.    MUSCULOSKELETAL: Degenerative changes of the spine with C6 vertebral body   height loss, unchanged.    IMPRESSION:    Nodular opacities in the lower lungs bilaterally, right greater than   left, with scattered linear subsegmental atelectasis likely representing   mucoid impaction and atelectasis. . The presence of a gastric   pull-through with accumulation of fluid and/ordebris within the   pull-through up to the level of the thoracic inlet also raises the   possibility of prior aspiration event.    < end of copied text >    [ ] Reviewed and interpreted by me    Point of Care Ultrasound Findings:    PFT:    EKG:

## 2018-04-27 NOTE — PROGRESS NOTE ADULT - ATTENDING COMMENTS
Dr. Juarez To follow as above-  hemoptysis likely from ENT source-no role for FOB  Asthma/Chronic bronchitis rx as outlined above-acapella/incentive spirometry                Aspiration precautions  Cards/endo follow up-for BP  PT needed    William Juarez MD-Pulmonary   [phone]

## 2018-04-27 NOTE — PROGRESS NOTE ADULT - ASSESSMENT
still orthostatic and hypotensive.  check orthostatic   increase midodrine  ENT do not stop asa and plavix

## 2018-04-27 NOTE — PROGRESS NOTE ADULT - ASSESSMENT
pt  with  h/o htn, cad, stents, br  asthma, seizure, cva , ca esophagus,  c/c  cachexia, RA  on prednisone   s./p  recent cath,   with rico, to  LAD and angioplasty, D1, ON 4/17,  an d since then, had  low  bp,, and  was  on midodrine, on last admission  h/o  anemia ,   on c/c . prednisone,, for  RA,    low  bp,  stress dose  hydrocortisone,   does  not  appear to have  sepsis,    ID called  c/.c anemia  Pt  is dnr now,    echo  with modertae AS. / ?  severe   asa/ plavix   given recent pci  ent eval and endo  dr lida cruz, for  low  bp,  and  on  c/c  prednisone 10 mg at home dose. florinef .1 mg bid. per  endo  pt  does  not have  adrenal insufficiency, ready advance diet, no artificial  feeding per  family.  and  pt wants  regular diet  hb of  7,6 today.  Follow-up repeat CBC. No rectal bleeding. pt  with  h/o htn, cad, stents, br  asthma, seizure, cva , ca esophagus,  c/c  cachexia, RA  on prednisone   s./p  recent cath,   with rico, to  LAD and angioplasty, D1, ON 4/17,  an d since then, had  low  bp,, and  was  on midodrine, on last admission  h/o  anemia ,   on c/c . prednisone,, for  RA,    low  bp,  stress dose  hydrocortisone,   does  not  appear to have  sepsis,    ID called  c/.c anemia  Pt  is dnr now,    echo  with modertae AS. / ?  severe   asa/ plavix   given recent pci  ent eval and endo  dr lida cruz, for  low  bp,  and  on  c/c  prednisone 10 mg at home dose. florinef .1 mg bid. per  endo  pt  does  not have  adrenal insufficiency, ready advance diet, no artificial  feeding per  family.  and  pt wants  regular diet  hgb of  8.4. No need to repeat for now. No rectal bleeding. Add Teds both legs.   Monitor orthostatics, then dishcarge.

## 2018-04-28 LAB
CK MB CFR SERPL CALC: 10.6 NG/ML — HIGH (ref 0–6.7)
CK MB CFR SERPL CALC: 9.2 NG/ML — HIGH (ref 0–6.7)
CK SERPL-CCNC: 80 U/L — SIGNIFICANT CHANGE UP (ref 30–200)
CK SERPL-CCNC: 80 U/L — SIGNIFICANT CHANGE UP (ref 30–200)
HCT VFR BLD CALC: 19.3 % — CRITICAL LOW (ref 39–50)
HCT VFR BLD CALC: 22 % — LOW (ref 39–50)
HGB BLD-MCNC: 6.5 G/DL — CRITICAL LOW (ref 13–17)
HGB BLD-MCNC: 7.1 G/DL — LOW (ref 13–17)
MCHC RBC-ENTMCNC: 30.6 PG — SIGNIFICANT CHANGE UP (ref 27–34)
MCHC RBC-ENTMCNC: 32.3 GM/DL — SIGNIFICANT CHANGE UP (ref 32–36)
MCHC RBC-ENTMCNC: 32.7 PG — SIGNIFICANT CHANGE UP (ref 27–34)
MCHC RBC-ENTMCNC: 33.6 GM/DL — SIGNIFICANT CHANGE UP (ref 32–36)
MCV RBC AUTO: 94.8 FL — SIGNIFICANT CHANGE UP (ref 80–100)
MCV RBC AUTO: 97.4 FL — SIGNIFICANT CHANGE UP (ref 80–100)
PLATELET # BLD AUTO: 174 K/UL — SIGNIFICANT CHANGE UP (ref 150–400)
PLATELET # BLD AUTO: 192 K/UL — SIGNIFICANT CHANGE UP (ref 150–400)
RBC # BLD: 1.99 M/UL — LOW (ref 4.2–5.8)
RBC # BLD: 2.32 M/UL — LOW (ref 4.2–5.8)
RBC # FLD: 16.1 % — HIGH (ref 10.3–14.5)
RBC # FLD: 18 % — HIGH (ref 10.3–14.5)
TROPONIN T SERPL-MCNC: 0.08 NG/ML — HIGH (ref 0–0.06)
TROPONIN T SERPL-MCNC: 0.09 NG/ML — HIGH (ref 0–0.06)
WBC # BLD: 6.81 K/UL — SIGNIFICANT CHANGE UP (ref 3.8–10.5)
WBC # BLD: 7.2 K/UL — SIGNIFICANT CHANGE UP (ref 3.8–10.5)
WBC # FLD AUTO: 6.81 K/UL — SIGNIFICANT CHANGE UP (ref 3.8–10.5)
WBC # FLD AUTO: 7.2 K/UL — SIGNIFICANT CHANGE UP (ref 3.8–10.5)

## 2018-04-28 PROCEDURE — 99233 SBSQ HOSP IP/OBS HIGH 50: CPT

## 2018-04-28 PROCEDURE — 93010 ELECTROCARDIOGRAM REPORT: CPT

## 2018-04-28 RX ORDER — ALPRAZOLAM 0.25 MG
0.25 TABLET ORAL ONCE
Qty: 0 | Refills: 0 | Status: DISCONTINUED | OUTPATIENT
Start: 2018-04-28 | End: 2018-04-28

## 2018-04-28 RX ADMIN — Medication 1 APPLICATION(S): at 17:42

## 2018-04-28 RX ADMIN — FLUDROCORTISONE ACETATE 0.1 MILLIGRAM(S): 0.1 TABLET ORAL at 06:22

## 2018-04-28 RX ADMIN — FLUDROCORTISONE ACETATE 0.1 MILLIGRAM(S): 0.1 TABLET ORAL at 17:42

## 2018-04-28 RX ADMIN — HEPARIN SODIUM 5000 UNIT(S): 5000 INJECTION INTRAVENOUS; SUBCUTANEOUS at 06:22

## 2018-04-28 RX ADMIN — Medication 1 SPRAY(S): at 11:41

## 2018-04-28 RX ADMIN — Medication 0.5 MILLIGRAM(S): at 06:21

## 2018-04-28 RX ADMIN — Medication 1 SPRAY(S): at 17:43

## 2018-04-28 RX ADMIN — MONTELUKAST 10 MILLIGRAM(S): 4 TABLET, CHEWABLE ORAL at 11:38

## 2018-04-28 RX ADMIN — Medication 81 MILLIGRAM(S): at 11:37

## 2018-04-28 RX ADMIN — MIDODRINE HYDROCHLORIDE 10 MILLIGRAM(S): 2.5 TABLET ORAL at 06:22

## 2018-04-28 RX ADMIN — BUDESONIDE AND FORMOTEROL FUMARATE DIHYDRATE 2 PUFF(S): 160; 4.5 AEROSOL RESPIRATORY (INHALATION) at 17:43

## 2018-04-28 RX ADMIN — TIOTROPIUM BROMIDE 1 CAPSULE(S): 18 CAPSULE ORAL; RESPIRATORY (INHALATION) at 11:37

## 2018-04-28 RX ADMIN — Medication 1 SPRAY(S): at 06:23

## 2018-04-28 RX ADMIN — FINASTERIDE 5 MILLIGRAM(S): 5 TABLET, FILM COATED ORAL at 21:29

## 2018-04-28 RX ADMIN — MIDODRINE HYDROCHLORIDE 10 MILLIGRAM(S): 2.5 TABLET ORAL at 11:36

## 2018-04-28 RX ADMIN — Medication 0.5 MILLIGRAM(S): at 21:29

## 2018-04-28 RX ADMIN — CLOPIDOGREL BISULFATE 75 MILLIGRAM(S): 75 TABLET, FILM COATED ORAL at 11:38

## 2018-04-28 RX ADMIN — Medication 200 MILLIGRAM(S): at 06:22

## 2018-04-28 RX ADMIN — Medication 100 MILLIGRAM(S): at 11:36

## 2018-04-28 RX ADMIN — Medication 10 MILLIGRAM(S): at 06:22

## 2018-04-28 RX ADMIN — HEPARIN SODIUM 5000 UNIT(S): 5000 INJECTION INTRAVENOUS; SUBCUTANEOUS at 17:42

## 2018-04-28 RX ADMIN — MIDODRINE HYDROCHLORIDE 10 MILLIGRAM(S): 2.5 TABLET ORAL at 17:42

## 2018-04-28 RX ADMIN — PREGABALIN 500 MICROGRAM(S): 225 CAPSULE ORAL at 17:42

## 2018-04-28 RX ADMIN — Medication 1 APPLICATION(S): at 06:22

## 2018-04-28 RX ADMIN — Medication 1 MILLIGRAM(S): at 11:41

## 2018-04-28 RX ADMIN — BUDESONIDE AND FORMOTEROL FUMARATE DIHYDRATE 2 PUFF(S): 160; 4.5 AEROSOL RESPIRATORY (INHALATION) at 06:22

## 2018-04-28 RX ADMIN — Medication 0.25 MILLIGRAM(S): at 13:07

## 2018-04-28 RX ADMIN — Medication 200 MILLIGRAM(S): at 17:42

## 2018-04-28 NOTE — PROGRESS NOTE ADULT - ASSESSMENT
pt  with  h/o htn, cad, stents, br  asthma, seizure, cva , ca esophagus,  c/c  cachexia, RA  on prednisone   s./p  recent cath,   with rico, to  LAD and angioplasty, D1, ON 4/17,  an d since then, had  low  bp,, and  was  on midodrine, on last admission  h/o  anemia , on prednisone,, for  RA,    low  bp,  stress dose  hydrocortisone complete.    does  not  appear to have  sepsis,    ID called  c/.c anemia  Pt  is dnr now,    echo  with modertae AS. / ?  severe   asa/ plavix   given recent pci  ent eval and endo  dr lida cruz, for  low  bp,  and  on  c/c  prednisone 10 mg at home dose. florinef .1 mg bid. per  endo  pt  does  not have  adrenal insufficiency, ready advance diet, no artificial  feeding per  family.  and  pt wants  regular diet  hgb of  8.4. No need to repeat for now. No rectal bleeding. Add Teds both legs.   Monitor orthostatics, then dishcarge. pt  with  h/o htn, cad, stents, br  asthma, seizure, cva , ca esophagus,  c/c  cachexia, RA  on prednisone   s./p  recent cath,   with rico, to  LAD and angioplasty, D1, ON 4/17,  an d since then, had  low  bp,, and  was  on midodrine, on last admission  h/o  anemia , on prednisone,, for  RA,    low  bp,  stress dose  hydrocortisone complete.    does  not  appear to have  sepsis,    ID called  c/.c anemia  Pt  is dnr now,    echo  with modertae AS. / ?  severe   asa/ plavix   given recent pci  ent eval and endo  dr lida cruz, for  low  bp,  and  on  c/c  prednisone 10 mg at home dose. florinef .1 mg bid. per  endo  pt  does  not have  adrenal insufficiency, ready advance diet, no artificial  feeding per  family.  and  pt wants  regular diet  hgb today lower 6.5 from 8.4. Will give 1 unit of PRBC. CBC in AM. No rectal bleeding. Add Teds both legs.   Monitor orthostatics.

## 2018-04-28 NOTE — PROGRESS NOTE ADULT - SUBJECTIVE AND OBJECTIVE BOX
INTERVAL HPI/OVERNIGHT EVENTS:  Pt seen and examined at bedside.     Allergies/Intolerance: penicillin (Rash)      MEDICATIONS  (STANDING):  ALPRAZolam 0.5 milliGRAM(s) Oral two times a day  aspirin enteric coated 81 milliGRAM(s) Oral daily  BACItracin   Ointment 1 Application(s) Topical two times a day  buDESOnide  80 MICROgram(s)/formoterol 4.5 MICROgram(s) Inhaler 2 Puff(s) Inhalation two times a day  clopidogrel Tablet 75 milliGRAM(s) Oral daily  cyanocobalamin 500 MICROGram(s) Oral daily  finasteride 5 milliGRAM(s) Oral at bedtime  fludroCORTISONE 0.1 milliGRAM(s) Oral two times a day  folic acid 1 milliGRAM(s) Oral daily  heparin  Injectable 5000 Unit(s) SubCutaneous every 12 hours  midodrine 10 milliGRAM(s) Oral <User Schedule>  montelukast 10 milliGRAM(s) Oral daily  phenytoin   Capsule 200 milliGRAM(s) Oral two times a day  predniSONE   Tablet 10 milliGRAM(s) Oral daily  pyridoxine 100 milliGRAM(s) Oral daily  sodium chloride 0.65% Nasal 1 Spray(s) Both Nostrils four times a day  tiotropium 18 MICROgram(s) Capsule 1 Capsule(s) Inhalation daily    MEDICATIONS  (PRN):  acetaminophen   Tablet 650 milliGRAM(s) Oral every 6 hours PRN For Temp greater than 38 C (100.4 F)  ALBUTerol/ipratropium for Nebulization 3 milliLiter(s) Nebulizer every 6 hours PRN Shortness of Breath and/or Wheezing        ROS: all systems reviewed and wnl      PHYSICAL EXAMINATION:  Vital Signs Last 24 Hrs  T(C): 36.8 (28 Apr 2018 08:22), Max: 36.8 (28 Apr 2018 08:22)  T(F): 98.2 (28 Apr 2018 08:22), Max: 98.2 (28 Apr 2018 08:22)  HR: 77 (28 Apr 2018 04:12) (77 - 77)  BP: 90/40 (28 Apr 2018 10:53) (70/40 - 101/59)  BP(mean): --  RR: 18 (28 Apr 2018 08:22) (18 - 18)  SpO2: 96% (28 Apr 2018 08:22) (95% - 97%)  CAPILLARY BLOOD GLUCOSE          04-28 @ 07:01  -  04-28 @ 14:12  --------------------------------------------------------  IN: 240 mL / OUT: 0 mL / NET: 240 mL        GENERAL:   NECK: supple, No JVD  CHEST/LUNG: clear to auscultation bilaterally; no rales, rhonchi, or wheezing b/l  HEART: normal S1, S2  ABDOMEN: BS+, soft, ND, NT   EXTREMITIES:  pulses palpable; no clubbing, cyanosis, or edema b/l LEs  SKIN: no rashes or lesions      LABS:                        8.4    7.5   )-----------( 189      ( 27 Apr 2018 17:05 )             23.8     04-27    138  |  103  |  20  ----------------------------<  63<L>  4.2   |  29  |  0.64    Ca    8.7      27 Apr 2018 07:17  Mg     2.0     04-27 INTERVAL HPI/OVERNIGHT EVENTS:  Pt seen and examined at bedside.     Allergies/Intolerance: penicillin (Rash)      MEDICATIONS  (STANDING):  ALPRAZolam 0.5 milliGRAM(s) Oral two times a day  aspirin enteric coated 81 milliGRAM(s) Oral daily  BACItracin   Ointment 1 Application(s) Topical two times a day  buDESOnide  80 MICROgram(s)/formoterol 4.5 MICROgram(s) Inhaler 2 Puff(s) Inhalation two times a day  clopidogrel Tablet 75 milliGRAM(s) Oral daily  cyanocobalamin 500 MICROGram(s) Oral daily  finasteride 5 milliGRAM(s) Oral at bedtime  fludroCORTISONE 0.1 milliGRAM(s) Oral two times a day  folic acid 1 milliGRAM(s) Oral daily  heparin  Injectable 5000 Unit(s) SubCutaneous every 12 hours  midodrine 10 milliGRAM(s) Oral <User Schedule>  montelukast 10 milliGRAM(s) Oral daily  phenytoin   Capsule 200 milliGRAM(s) Oral two times a day  predniSONE   Tablet 10 milliGRAM(s) Oral daily  pyridoxine 100 milliGRAM(s) Oral daily  sodium chloride 0.65% Nasal 1 Spray(s) Both Nostrils four times a day  tiotropium 18 MICROgram(s) Capsule 1 Capsule(s) Inhalation daily    MEDICATIONS  (PRN):  acetaminophen   Tablet 650 milliGRAM(s) Oral every 6 hours PRN For Temp greater than 38 C (100.4 F)  ALBUTerol/ipratropium for Nebulization 3 milliLiter(s) Nebulizer every 6 hours PRN Shortness of Breath and/or Wheezing        ROS: all systems reviewed and wnl      PHYSICAL EXAMINATION:  Vital Signs Last 24 Hrs  T(C): 36.8 (28 Apr 2018 08:22), Max: 36.8 (28 Apr 2018 08:22)  T(F): 98.2 (28 Apr 2018 08:22), Max: 98.2 (28 Apr 2018 08:22)  HR: 77 (28 Apr 2018 04:12) (77 - 77)  BP: 90/40 (28 Apr 2018 10:53) (70/40 - 101/59)  BP(mean): --  RR: 18 (28 Apr 2018 08:22) (18 - 18)  SpO2: 96% (28 Apr 2018 08:22) (95% - 97%)  CAPILLARY BLOOD GLUCOSE          04-28 @ 07:01  -  04-28 @ 14:12  --------------------------------------------------------  IN: 240 mL / OUT: 0 mL / NET: 240 mL        GENERAL: stable now, CP earlier in day has resolved  NECK: supple, No JVD  CHEST/LUNG: clear to auscultation bilaterally; no rales, rhonchi, or wheezing b/l  HEART: normal S1, S2  ABDOMEN: BS+, soft, ND, NT   EXTREMITIES:  pulses palpable; no clubbing, cyanosis, or edema b/l LEs  SKIN: no rashes or lesions      LABS:                        8.4    7.5   )-----------( 189      ( 27 Apr 2018 17:05 )             23.8     04-27    138  |  103  |  20  ----------------------------<  63<L>  4.2   |  29  |  0.64    Ca    8.7      27 Apr 2018 07:17  Mg     2.0     04-27

## 2018-04-28 NOTE — PROGRESS NOTE ADULT - SUBJECTIVE AND OBJECTIVE BOX
[facility] - Division of Pulmonary, Critical Care and Sleep Medicine   Please call [phone] between 8am-pm weekdays, [phone] after hours and weekends    Interval Events:    REVIEW OF SYSTEMS:  CV: [ ] chest pain   Resp: [ ] cough [ ] shortness of breath   [ ] All other systems negative  [ ] Unable to assess ROS because ________    OBJECTIVE:  ICU Vital Signs Last 24 Hrs  T(C): 36.8 (28 Apr 2018 08:22), Max: 36.8 (28 Apr 2018 08:22)  T(F): 98.2 (28 Apr 2018 08:22), Max: 98.2 (28 Apr 2018 08:22)  HR: 77 (28 Apr 2018 04:12) (77 - 77)  BP: 101/59 (28 Apr 2018 04:12) (95/51 - 101/59)  BP(mean): --  ABP: --  ABP(mean): --  RR: 18 (28 Apr 2018 08:22) (18 - 18)  SpO2: 96% (28 Apr 2018 08:22) (95% - 97%)    PHYSICAL EXAM:  General: NAD  HEENT: NC/AT  Lymph Nodes: no cervical or supraclavicular lymphadenopathy  Neck: supple  Respiratory:  CTA b/l, no wheezes, crackles or rhonchi  Cardiovascular:  RRR, no m/r/g  Abdomen: soft, NT/ND, +BS  Extremities: no clubbing, cyanosis or edema, warm  Skin: no rash  Neurological: AAOx3, non focal exam  Psychiatry: not anxious appearing, normal affect and mood    HOSPITAL MEDICATIONS:  MEDICATIONS  (STANDING):  ALPRAZolam 0.5 milliGRAM(s) Oral two times a day  aspirin enteric coated 81 milliGRAM(s) Oral daily  BACItracin   Ointment 1 Application(s) Topical two times a day  buDESOnide  80 MICROgram(s)/formoterol 4.5 MICROgram(s) Inhaler 2 Puff(s) Inhalation two times a day  clopidogrel Tablet 75 milliGRAM(s) Oral daily  cyanocobalamin 500 MICROGram(s) Oral daily  finasteride 5 milliGRAM(s) Oral at bedtime  fludroCORTISONE 0.1 milliGRAM(s) Oral two times a day  folic acid 1 milliGRAM(s) Oral daily  heparin  Injectable 5000 Unit(s) SubCutaneous every 12 hours  midodrine 10 milliGRAM(s) Oral <User Schedule>  montelukast 10 milliGRAM(s) Oral daily  phenytoin   Capsule 200 milliGRAM(s) Oral two times a day  predniSONE   Tablet 10 milliGRAM(s) Oral daily  pyridoxine 100 milliGRAM(s) Oral daily  sodium chloride 0.65% Nasal 1 Spray(s) Both Nostrils four times a day  tiotropium 18 MICROgram(s) Capsule 1 Capsule(s) Inhalation daily    MEDICATIONS  (PRN):  acetaminophen   Tablet 650 milliGRAM(s) Oral every 6 hours PRN For Temp greater than 38 C (100.4 F)  ALBUTerol/ipratropium for Nebulization 3 milliLiter(s) Nebulizer every 6 hours PRN Shortness of Breath and/or Wheezing      LABS:                        8.4    7.5   )-----------( 189      ( 27 Apr 2018 17:05 )             23.8     Hgb Trend: 8.4<--, 7.5<--, 8.2<--, 7.9<--, 8.8<--  04-27    138  |  103  |  20  ----------------------------<  63<L>  4.2   |  29  |  0.64    Ca    8.7      27 Apr 2018 07:17  Mg     2.0     04-27      Creatinine Trend: 0.64<--, 0.61<--, 0.60<--, 0.68<--, 0.67<--, 0.73<--        MICROBIOLOGY:   Culture - Urine (04.21.18 @ 06:37)    -  Vancomycin: S 2    -  Tetra/Doxy: S <=1    -  Nitrofurantoin: S <=32    -  Ciprofloxacin: R >2    -  Ampicillin: S <=2    Specimen Source: .Urine Clean Catch (Midstream)    Culture Results:   10,000 - 49,000 CFU/mL Enterococcus faecalis  <10,000 CFU/ml Normal Urogenital robel present    Organism Identification: Enterococcus faecalis    Organism: Enterococcus faecalis    Method Type: ZOË      RADIOLOGY:  [x ] Reviewed and interpreted by me  < from: CT Chest No Cont (04.26.18 @ 12:59) >  EXAM:  CT CHEST                            PROCEDURE DATE:  04/26/2018            INTERPRETATION:  CLINICAL INFORMATION: Hemoptysis.    COMPARISON: CT chest 12/7/2017, 10/13/2017.    PROCEDURE:   CT of the Chest was performed without intravenous contrast.  Sagittal and coronal reformats were performed as well as 3D (MIP)   reconstructions.      FINDINGS:    CHEST:     LUNGS AND LARGE AIRWAYS: Patent central airways with small amount of   debris in the right mainstem bronchus; no bronchiectasis. Nodular   opacities in the lower lungs bilaterally, right greater than left, with   scattered linear subsegmental atelectasis. Mild centrilobular emphysema.    PLEURA: Small pleural effusions bilaterally with partial loculation of   the right pleural effusion. There are mildly decreased compared to   12/7/2017.    VESSELS: Left-sided aortic arch with left-sided descending thoracic   aorta. Normal caliber of the aorta and main pulmonary artery.   Atheromatous disease of the aorta    HEART: Heart size is normal. No pericardial effusion. Aortic valve and   mitral annual calcifications. Coronary artery calcifications and coronary   artery stents.    MEDIASTINUM AND LAURA: No lymphadenopathy.    CHEST WALL AND LOWER NECK: 0.6 cm hypodense lesion in the left thyroid   lobe.    VISUALIZED UPPER ABDOMEN: Status post esophagectomy with gastric pull-up.   Right renal cysts.    MUSCULOSKELETAL: Degenerative changes of the spine with C6 vertebral body   height loss, unchanged.    IMPRESSION:    Nodular opacities in the lower lungs bilaterally, right greater than   left, with scattered linear subsegmental atelectasis likely representing   mucoid impaction and atelectasis. . The presence of a gastric   pull-through with accumulation of fluid and/ordebris within the   pull-through up to the level of the thoracic inlet also raises the   possibility of prior aspiration event.    Mildly decreased small pleural effusions bilaterally with redemonstration   of partial loculation of the right pleural effusion.    < end of copied text > [facility] - Division of Pulmonary, Critical Care and Sleep Medicine   Please call [phone] between 8am-pm weekdays, [phone] after hours and weekends    Interval Events: Feels well overall however continues to have cough with dark clots, occasionally streaked sputum. Denies SOB or chest pain.     REVIEW OF SYSTEMS:  CV: [- ] chest pain   Resp: [+ ] cough [ -] shortness of breath   [x ] All other systems negative  [ ] Unable to assess ROS because ________    OBJECTIVE:  ICU Vital Signs Last 24 Hrs  T(C): 36.8 (28 Apr 2018 08:22), Max: 36.8 (28 Apr 2018 08:22)  T(F): 98.2 (28 Apr 2018 08:22), Max: 98.2 (28 Apr 2018 08:22)  HR: 77 (28 Apr 2018 04:12) (77 - 77)  BP: 101/59 (28 Apr 2018 04:12) (95/51 - 101/59)  BP(mean): --  ABP: --  ABP(mean): --  RR: 18 (28 Apr 2018 08:22) (18 - 18)  SpO2: 96% (28 Apr 2018 08:22) (95% - 97%)    PHYSICAL EXAM:  General: NAD  HEENT: NC/AT  Lymph Nodes: no cervical or supraclavicular lymphadenopathy  Neck: supple  Respiratory:  CTA b/l, no wheezes, crackles or rhonchi  Cardiovascular:  RRR, no m/r/g  Abdomen: soft, NT/ND, +BS  Extremities: no clubbing, cyanosis or edema, warm  Skin: no rash  Neurological: AAOx3, non focal exam  Psychiatry: not anxious appearing, normal affect and mood    HOSPITAL MEDICATIONS:  MEDICATIONS  (STANDING):  ALPRAZolam 0.5 milliGRAM(s) Oral two times a day  aspirin enteric coated 81 milliGRAM(s) Oral daily  BACItracin   Ointment 1 Application(s) Topical two times a day  buDESOnide  80 MICROgram(s)/formoterol 4.5 MICROgram(s) Inhaler 2 Puff(s) Inhalation two times a day  clopidogrel Tablet 75 milliGRAM(s) Oral daily  cyanocobalamin 500 MICROGram(s) Oral daily  finasteride 5 milliGRAM(s) Oral at bedtime  fludroCORTISONE 0.1 milliGRAM(s) Oral two times a day  folic acid 1 milliGRAM(s) Oral daily  heparin  Injectable 5000 Unit(s) SubCutaneous every 12 hours  midodrine 10 milliGRAM(s) Oral <User Schedule>  montelukast 10 milliGRAM(s) Oral daily  phenytoin   Capsule 200 milliGRAM(s) Oral two times a day  predniSONE   Tablet 10 milliGRAM(s) Oral daily  pyridoxine 100 milliGRAM(s) Oral daily  sodium chloride 0.65% Nasal 1 Spray(s) Both Nostrils four times a day  tiotropium 18 MICROgram(s) Capsule 1 Capsule(s) Inhalation daily    MEDICATIONS  (PRN):  acetaminophen   Tablet 650 milliGRAM(s) Oral every 6 hours PRN For Temp greater than 38 C (100.4 F)  ALBUTerol/ipratropium for Nebulization 3 milliLiter(s) Nebulizer every 6 hours PRN Shortness of Breath and/or Wheezing      LABS:                        8.4    7.5   )-----------( 189      ( 27 Apr 2018 17:05 )             23.8     Hgb Trend: 8.4<--, 7.5<--, 8.2<--, 7.9<--, 8.8<--  04-27    138  |  103  |  20  ----------------------------<  63<L>  4.2   |  29  |  0.64    Ca    8.7      27 Apr 2018 07:17  Mg     2.0     04-27      Creatinine Trend: 0.64<--, 0.61<--, 0.60<--, 0.68<--, 0.67<--, 0.73<--        MICROBIOLOGY:   Culture - Urine (04.21.18 @ 06:37)    -  Vancomycin: S 2    -  Tetra/Doxy: S <=1    -  Nitrofurantoin: S <=32    -  Ciprofloxacin: R >2    -  Ampicillin: S <=2    Specimen Source: .Urine Clean Catch (Midstream)    Culture Results:   10,000 - 49,000 CFU/mL Enterococcus faecalis  <10,000 CFU/ml Normal Urogenital robel present    Organism Identification: Enterococcus faecalis    Organism: Enterococcus faecalis    Method Type: ZOË      RADIOLOGY:  [x ] Reviewed and interpreted by me  < from: CT Chest No Cont (04.26.18 @ 12:59) >  EXAM:  CT CHEST                            PROCEDURE DATE:  04/26/2018            INTERPRETATION:  CLINICAL INFORMATION: Hemoptysis.    COMPARISON: CT chest 12/7/2017, 10/13/2017.    PROCEDURE:   CT of the Chest was performed without intravenous contrast.  Sagittal and coronal reformats were performed as well as 3D (MIP)   reconstructions.      FINDINGS:    CHEST:     LUNGS AND LARGE AIRWAYS: Patent central airways with small amount of   debris in the right mainstem bronchus; no bronchiectasis. Nodular   opacities in the lower lungs bilaterally, right greater than left, with   scattered linear subsegmental atelectasis. Mild centrilobular emphysema.    PLEURA: Small pleural effusions bilaterally with partial loculation of   the right pleural effusion. There are mildly decreased compared to   12/7/2017.    VESSELS: Left-sided aortic arch with left-sided descending thoracic   aorta. Normal caliber of the aorta and main pulmonary artery.   Atheromatous disease of the aorta    HEART: Heart size is normal. No pericardial effusion. Aortic valve and   mitral annual calcifications. Coronary artery calcifications and coronary   artery stents.    MEDIASTINUM AND LAURA: No lymphadenopathy.    CHEST WALL AND LOWER NECK: 0.6 cm hypodense lesion in the left thyroid   lobe.    VISUALIZED UPPER ABDOMEN: Status post esophagectomy with gastric pull-up.   Right renal cysts.    MUSCULOSKELETAL: Degenerative changes of the spine with C6 vertebral body   height loss, unchanged.    IMPRESSION:    Nodular opacities in the lower lungs bilaterally, right greater than   left, with scattered linear subsegmental atelectasis likely representing   mucoid impaction and atelectasis. . The presence of a gastric   pull-through with accumulation of fluid and/ordebris within the   pull-through up to the level of the thoracic inlet also raises the   possibility of prior aspiration event.    Mildly decreased small pleural effusions bilaterally with redemonstration   of partial loculation of the right pleural effusion.    < end of copied text >

## 2018-04-28 NOTE — PROGRESS NOTE ADULT - SUBJECTIVE AND OBJECTIVE BOX
CARDIOLOGY     PROGRESS  NOTE   ________________________________________________    CHIEF COMPLAINT:Patient is a 82y old  Male who presents with a chief complaint of hypotension, hypothermia (23 Apr 2018 11:28)  no complain.  	  REVIEW OF SYSTEMS:  CONSTITUTIONAL: No fever, weight loss, or fatigue  EYES: No eye pain, visual disturbances, or discharge  ENT:  No difficulty hearing, tinnitus, vertigo; No sinus or throat pain  NECK: No pain or stiffness  RESPIRATORY: No cough, wheezing, chills or hemoptysis; No Shortness of Breath  CARDIOVASCULAR: No chest pain, palpitations, passing out, dizziness, or leg swelling  GASTROINTESTINAL: No abdominal or epigastric pain. No nausea, vomiting, or hematemesis; No diarrhea or constipation. No melena or hematochezia.  GENITOURINARY: No dysuria, frequency, hematuria, or incontinence  NEUROLOGICAL: No headaches, memory loss, loss of strength, numbness, or tremors  SKIN: No itching, burning, rashes, or lesions   LYMPH Nodes: No enlarged glands  ENDOCRINE: No heat or cold intolerance; No hair loss  MUSCULOSKELETAL: No joint pain or swelling; No muscle, back, or extremity pain  PSYCHIATRIC: No depression, anxiety, mood swings, or difficulty sleeping  HEME/LYMPH: No easy bruising, or bleeding gums  ALLERGY AND IMMUNOLOGIC: No hives or eczema	    [ ] All others negative	  [ ] Unable to obtain    PHYSICAL EXAM:  T(C): 36.8 (04-28-18 @ 08:22), Max: 36.8 (04-28-18 @ 08:22)  HR: 77 (04-28-18 @ 04:12) (77 - 77)  BP: 101/59 (04-28-18 @ 04:12) (95/51 - 101/59)  RR: 18 (04-28-18 @ 08:22) (18 - 18)  SpO2: 96% (04-28-18 @ 08:22) (95% - 97%)  Wt(kg): --  I&O's Summary    28 Apr 2018 07:01  -  28 Apr 2018 10:10  --------------------------------------------------------  IN: 240 mL / OUT: 0 mL / NET: 240 mL        Appearance: Normal	  HEENT:   Normal oral mucosa, PERRL, EOMI	  Lymphatic: No lymphadenopathy  Cardiovascular: Normal S1 S2, No JVD, + murmurs, No edema  Respiratory: Lungs clear to auscultation	  Psychiatry: A & O x 3, Mood & affect appropriate  Gastrointestinal:  Soft, Non-tender, + BS	  Skin: No rashes, No ecchymoses, No cyanosis	  Neurologic: Non-focal  Extremities: Normal range of motion, No clubbing, cyanosis or edema  Vascular: Peripheral pulses palpable 2+ bilaterally    MEDICATIONS  (STANDING):  ALPRAZolam 0.5 milliGRAM(s) Oral two times a day  aspirin enteric coated 81 milliGRAM(s) Oral daily  BACItracin   Ointment 1 Application(s) Topical two times a day  buDESOnide  80 MICROgram(s)/formoterol 4.5 MICROgram(s) Inhaler 2 Puff(s) Inhalation two times a day  clopidogrel Tablet 75 milliGRAM(s) Oral daily  cyanocobalamin 500 MICROGram(s) Oral daily  finasteride 5 milliGRAM(s) Oral at bedtime  fludroCORTISONE 0.1 milliGRAM(s) Oral two times a day  folic acid 1 milliGRAM(s) Oral daily  heparin  Injectable 5000 Unit(s) SubCutaneous every 12 hours  midodrine 10 milliGRAM(s) Oral <User Schedule>  montelukast 10 milliGRAM(s) Oral daily  phenytoin   Capsule 200 milliGRAM(s) Oral two times a day  predniSONE   Tablet 10 milliGRAM(s) Oral daily  pyridoxine 100 milliGRAM(s) Oral daily  sodium chloride 0.65% Nasal 1 Spray(s) Both Nostrils four times a day  tiotropium 18 MICROgram(s) Capsule 1 Capsule(s) Inhalation daily      TELEMETRY: 	    ECG:  	  RADIOLOGY:  OTHER: 	  	  LABS:	 	    CARDIAC MARKERS:                                8.4    7.5   )-----------( 189      ( 27 Apr 2018 17:05 )             23.8     04-27    138  |  103  |  20  ----------------------------<  63<L>  4.2   |  29  |  0.64    Ca    8.7      27 Apr 2018 07:17  Mg     2.0     04-27      proBNP:   Lipid Profile:   HgA1c:   TSH: Thyroid Stimulating Hormone, Serum: 7.80 uIU/mL (04-24 @ 07:51)  Thyroid Stimulating Hormone, Serum: 10.24 uIU/mL (04-21 @ 07:04)          Assessment and plan  ---------------------------  bp is better with increasing midodrine  will try to decrease florinef slowly  physical therapy

## 2018-04-28 NOTE — CHART NOTE - NSCHARTNOTEFT_GEN_A_CORE
Called by RN with lab results. H/H 6.5/19.3. Trop 0.09. 1 unit PRBC ordered. CBC am. Trend CE. EKG unchanged. Denies any pain. Will monitor. D/W Dr. Young.

## 2018-04-28 NOTE — PROVIDER CONTACT NOTE (OTHER) - ASSESSMENT
Pt is A&Ox4, awake, took midrodinem & florinef, s/p 2 units PRBCs, hemoptysis. Pt's bp tends to run low systolic in 80s, 70s.

## 2018-04-28 NOTE — PROVIDER CONTACT NOTE (OTHER) - REASON
Pt bp s/p 2 units PRBCs is 91/54 Pt bp s/p 1 units PRBCs is 91/54, pt too weak to do orthostatic hypotension

## 2018-04-28 NOTE — CHART NOTE - NSCHARTNOTEFT_GEN_A_CORE
Notified by RN patient complaint of chest pressure    Patient is a 82y old  Male who presents with a chief complaint of hypotension, hypothermia (23 Apr 2018 11:28)      SUBJECTIVE / OVERNIGHT EVENTS:    MEDICATIONS  (STANDING):  ALPRAZolam 0.5 milliGRAM(s) Oral two times a day  aspirin enteric coated 81 milliGRAM(s) Oral daily  BACItracin   Ointment 1 Application(s) Topical two times a day  buDESOnide  80 MICROgram(s)/formoterol 4.5 MICROgram(s) Inhaler 2 Puff(s) Inhalation two times a day  clopidogrel Tablet 75 milliGRAM(s) Oral daily  cyanocobalamin 500 MICROGram(s) Oral daily  finasteride 5 milliGRAM(s) Oral at bedtime  fludroCORTISONE 0.1 milliGRAM(s) Oral two times a day  folic acid 1 milliGRAM(s) Oral daily  heparin  Injectable 5000 Unit(s) SubCutaneous every 12 hours  midodrine 10 milliGRAM(s) Oral <User Schedule>  montelukast 10 milliGRAM(s) Oral daily  phenytoin   Capsule 200 milliGRAM(s) Oral two times a day  predniSONE   Tablet 10 milliGRAM(s) Oral daily  pyridoxine 100 milliGRAM(s) Oral daily  sodium chloride 0.65% Nasal 1 Spray(s) Both Nostrils four times a day  tiotropium 18 MICROgram(s) Capsule 1 Capsule(s) Inhalation daily    MEDICATIONS  (PRN):  acetaminophen   Tablet 650 milliGRAM(s) Oral every 6 hours PRN For Temp greater than 38 C (100.4 F)  ALBUTerol/ipratropium for Nebulization 3 milliLiter(s) Nebulizer every 6 hours PRN Shortness of Breath and/or Wheezing    I&O's Summary    28 Apr 2018 07:01  -  28 Apr 2018 13:25  --------------------------------------------------------  IN: 240 mL / OUT: 0 mL / NET: 240 mL    LABS:                        8.4    7.5   )-----------( 189      ( 27 Apr 2018 17:05 )             23.8     04-27    138  |  103  |  20  ----------------------------<  63<L>  4.2   |  29  |  0.64    Ca    8.7      27 Apr 2018 07:17  Mg     2.0     04-27      RADIOLOGY & ADDITIONAL TESTS:    PHYSICAL EXAM:  GENERAL: NAD, well-developed  HEAD:  Atraumatic, Normocephalic  EYES: EOMI, PERRLA, conjunctiva and sclera clear  NECK: Supple, No JVD  CHEST/LUNG: Clear to auscultation bilaterally; No wheeze  HEART: Regular rate and rhythm; No murmurs, rubs, or gallops  ABDOMEN: Soft, Nontender, Nondistended; Bowel sounds present  EXTREMITIES:  2+ Peripheral Pulses, No clubbing, cyanosis, or edema  PSYCH: AAOx3  NEUROLOGY: non-focal  SKIN: No rashes or lesions    A/P: 82M with hx of CAD with multiple stents (most recently 4/2018), asthma, former smoker, esophageal cancer with esophagectomy and gastric pullup, RA on chronic steroids who presents with hypotension in the setting of secondary adrenal insufficiency. Now c/o of mid sternal chest pressure, 5/10, radiating to jaw, improved with deep breathing, associated with anxiety.     - EKG  - Labs, cardiac enzymes  - Cont ASA/Plavix  - Xanax 0.25mg po x 1  - O2 2L nc as needed  - Cardiology following  - Message left for Dr. Young  - Will monitor

## 2018-04-28 NOTE — PROVIDER CONTACT NOTE (CRITICAL VALUE NOTIFICATION) - BACKGROUND
(PMH) Seizure disorder  (PMH) Coronary artery disease  (PMH) Chronic allergic rhinitis  (PMH) Anxiety  (PMH) Cerebrovascular accident (CVA)  (PMH) BCC (basal cell carcinoma)

## 2018-04-28 NOTE — PROGRESS NOTE ADULT - SUBJECTIVE AND OBJECTIVE BOX
MEJIA LATIF:[id number],   82yMale followed for:  penicillin (Rash)    PAST MEDICAL & SURGICAL HISTORY:  Coronary artery disease: s/p 3 stents on June 30, 2017 at [place] Dr. Lavelle Reid  OA (osteoarthritis) of knee: right  Former smoker, stopped smoking in distant past  Rheumatoid arthritis  Seizure disorder: 1 episode approximately 15 yrs ago  Asthma  Hyperlipidemia  BPH (benign prostatic hyperplasia)  HTN (hypertension)  Esophagus cancer  Chronic allergic rhinitis  BCC (basal cell carcinoma): skin  Cerebrovascular accident (CVA)  Anxiety  Anastomotic leak following esophagectomy: [place] [facility]an esophagectomy  History of tonsillectomy  H/O heart artery stent: June 30, 2017  Knee arthropathy: left  History of total hip replacement: left  History of hernia repair    FAMILY HISTORY:  Family history of heart attack  Family history of pulmonary embolism: father @ 49 yr old  FH: CAD (coronary artery disease) (Sibling)    MEDICATIONS  (STANDING):  ALPRAZolam 0.5 milliGRAM(s) Oral two times a day  aspirin enteric coated 81 milliGRAM(s) Oral daily  BACItracin   Ointment 1 Application(s) Topical two times a day  buDESOnide  80 MICROgram(s)/formoterol 4.5 MICROgram(s) Inhaler 2 Puff(s) Inhalation two times a day  clopidogrel Tablet 75 milliGRAM(s) Oral daily  cyanocobalamin 500 MICROGram(s) Oral daily  finasteride 5 milliGRAM(s) Oral at bedtime  fludroCORTISONE 0.1 milliGRAM(s) Oral two times a day  folic acid 1 milliGRAM(s) Oral daily  heparin  Injectable 5000 Unit(s) SubCutaneous every 12 hours  midodrine 10 milliGRAM(s) Oral <User Schedule>  montelukast 10 milliGRAM(s) Oral daily  phenytoin   Capsule 200 milliGRAM(s) Oral two times a day  predniSONE   Tablet 10 milliGRAM(s) Oral daily  pyridoxine 100 milliGRAM(s) Oral daily  sodium chloride 0.65% Nasal 1 Spray(s) Both Nostrils four times a day  tiotropium 18 MICROgram(s) Capsule 1 Capsule(s) Inhalation daily    MEDICATIONS  (PRN):  acetaminophen   Tablet 650 milliGRAM(s) Oral every 6 hours PRN For Temp greater than 38 C (100.4 F)  ALBUTerol/ipratropium for Nebulization 3 milliLiter(s) Nebulizer every 6 hours PRN Shortness of Breath and/or Wheezing      Vital Signs Last 24 Hrs  T(C): 36.8 (28 Apr 2018 08:22), Max: 36.8 (28 Apr 2018 08:22)  T(F): 98.2 (28 Apr 2018 08:22), Max: 98.2 (28 Apr 2018 08:22)  HR: 77 (28 Apr 2018 04:12) (77 - 77)  BP: 90/40 (28 Apr 2018 10:53) (70/40 - 101/59)  BP(mean): --  RR: 18 (28 Apr 2018 08:22) (18 - 18)  SpO2: 96% (28 Apr 2018 08:22) (95% - 97%)  nc/at  s1s2  cta  soft, nt, nd no guarding or rebound  no c/c/e    CBC Full  -  ( 27 Apr 2018 17:05 )  WBC Count : 7.5 K/uL  Hemoglobin : 8.4 g/dL  Hematocrit : 23.8 %  Platelet Count - Automated : 189 K/uL  Mean Cell Volume : 96.8 fl  Mean Cell Hemoglobin : 33.9 pg  Mean Cell Hemoglobin Concentration : 35.1 gm/dL  Auto Neutrophil # : x  Auto Lymphocyte # : x  Auto Monocyte # : x  Auto Eosinophil # : x  Auto Basophil # : x  Auto Neutrophil % : x  Auto Lymphocyte % : x  Auto Monocyte % : x  Auto Eosinophil % : x  Auto Basophil % : x    04-27    138  |  103  |  20  ----------------------------<  63<L>  4.2   |  29  |  0.64    Ca    8.7      27 Apr 2018 07:17  Mg     2.0     04-27

## 2018-04-28 NOTE — PROGRESS NOTE ADULT - ASSESSMENT
82M CAD with multiple stents (most recently 4/2018), asthma, former smoker, esophageal cancer with esophagectomy and gastric pullup, RA on chronic steroids who presents with hypotension in the setting of secondary adrenal insufficiency who now has had epistaxis with expectoration of blood.    Problem/Plan - 1:  ·  Problem: R/O Hemoptysis.  Plan: likely due to ENT source--no role for bronchoscopy. ENT following - nasal saline, avoid nose manipulation, H/H stable.    Problem/Plan - 2:  ·  Problem: Adrenal insufficiency due to steroid withdrawal.  Plan: endocrine/cards follow up-midodrine/fludrocortisone.     Problem/Plan - 3:  ·  Problem: Hypotension, unspecified hypotension type.  Plan: no evidence for sepsis--as above.     Problem/Plan - 4:  ·  Problem: Mild intermittent asthma without complication.  Plan: symbicort 80 bid  spiriva 1 puff q day  singulair 10mg q hs  acapella  pulmonary toilet-incentive spirometry, Chest Pt-acapella/chest vest    Problem/Plan - 6:  Problem: Malignant neoplasm of lower third of esophagus. Plan: onc follow up.    Problem/Plan - 7:  ·  Problem: Dysphagia, unspecified type.  Plan: GI prophylaxis:  PPI pre breakfast  aspiration precautions-all meals are to OOB as able.     Problem/Plan - 8:  ·  Problem: Coronary artery disease involving native coronary artery of native heart without angina pectoris.  Plan: cards follow up-mult rx.     Problem/Plan - 9:  ·  Problem: Prophylactic measure.  Plan: DVT prophlaxis:  subcutaneous lovenox or heparin as per primary team  sequential teds stockings  early ambulation. 82M CAD with multiple stents (most recently 4/2018), asthma, former smoker, esophageal cancer with esophagectomy and gastric pullup, RA on chronic steroids who presents with hypotension in the setting of secondary adrenal insufficiency who now has episodes of expectorating blood - ?epistaxis vs. hemoptysis    Problem/Plan - 1:  ·  Problem: R/O Hemoptysis.  Plan: ENT following - nasal saline, avoid nose manipulation, H/H stable. Would hold acapella today    Problem/Plan - 2:  ·  Problem: Adrenal insufficiency due to steroid withdrawal.  Plan: endocrine/cards follow up-midodrine/fludrocortisone.     Problem/Plan - 3:  ·  Problem: Hypotension, unspecified hypotension type.  Plan: no evidence for sepsis--as above.     Problem/Plan - 4:  ·  Problem: Mild intermittent asthma without complication.  Plan: symbicort 80 bid  spiriva 1 puff q day  singulair 10mg q hs  Hold pulmonary toilet today    Problem/Plan - 6:  Problem: Malignant neoplasm of lower third of esophagus. Plan: onc follow up.    Problem/Plan - 7:  ·  Problem: Dysphagia, unspecified type.  Plan: GI prophylaxis:  PPI pre breakfast  aspiration precautions-all meals are to OOB as able.     Problem/Plan - 8:  ·  Problem: Coronary artery disease involving native coronary artery of native heart without angina pectoris.  Plan: cards follow up-mult rx.     Problem/Plan - 9:  ·  Problem: Prophylactic measure.  Plan: DVT prophlaxis:  subcutaneous lovenox or heparin as per primary team  sequential teds stockings  early ambulation.

## 2018-04-29 LAB
CK MB CFR SERPL CALC: 8.4 NG/ML — HIGH (ref 0–6.7)
HCT VFR BLD CALC: 22.6 % — LOW (ref 39–50)
HCT VFR BLD CALC: 25.9 % — LOW (ref 39–50)
HGB BLD-MCNC: 7.3 G/DL — LOW (ref 13–17)
HGB BLD-MCNC: 8.9 G/DL — LOW (ref 13–17)
MCHC RBC-ENTMCNC: 30.7 PG — SIGNIFICANT CHANGE UP (ref 27–34)
MCHC RBC-ENTMCNC: 32.3 GM/DL — SIGNIFICANT CHANGE UP (ref 32–36)
MCHC RBC-ENTMCNC: 32.5 PG — SIGNIFICANT CHANGE UP (ref 27–34)
MCHC RBC-ENTMCNC: 34.4 GM/DL — SIGNIFICANT CHANGE UP (ref 32–36)
MCV RBC AUTO: 94.7 FL — SIGNIFICANT CHANGE UP (ref 80–100)
MCV RBC AUTO: 95 FL — SIGNIFICANT CHANGE UP (ref 80–100)
PLATELET # BLD AUTO: 157 K/UL — SIGNIFICANT CHANGE UP (ref 150–400)
PLATELET # BLD AUTO: 176 K/UL — SIGNIFICANT CHANGE UP (ref 150–400)
RBC # BLD: 2.38 M/UL — LOW (ref 4.2–5.8)
RBC # BLD: 2.73 M/UL — LOW (ref 4.2–5.8)
RBC # FLD: 15.9 % — HIGH (ref 10.3–14.5)
RBC # FLD: 18.6 % — HIGH (ref 10.3–14.5)
TROPONIN T SERPL-MCNC: 0.09 NG/ML — HIGH (ref 0–0.06)
TSH SERPL-MCNC: 10.92 UIU/ML — HIGH (ref 0.27–4.2)
WBC # BLD: 5.71 K/UL — SIGNIFICANT CHANGE UP (ref 3.8–10.5)
WBC # BLD: 6.5 K/UL — SIGNIFICANT CHANGE UP (ref 3.8–10.5)
WBC # FLD AUTO: 5.71 K/UL — SIGNIFICANT CHANGE UP (ref 3.8–10.5)
WBC # FLD AUTO: 6.5 K/UL — SIGNIFICANT CHANGE UP (ref 3.8–10.5)

## 2018-04-29 PROCEDURE — 93010 ELECTROCARDIOGRAM REPORT: CPT

## 2018-04-29 PROCEDURE — 99233 SBSQ HOSP IP/OBS HIGH 50: CPT | Mod: GC

## 2018-04-29 RX ADMIN — Medication 1 SPRAY(S): at 13:37

## 2018-04-29 RX ADMIN — PREGABALIN 500 MICROGRAM(S): 225 CAPSULE ORAL at 13:36

## 2018-04-29 RX ADMIN — HEPARIN SODIUM 5000 UNIT(S): 5000 INJECTION INTRAVENOUS; SUBCUTANEOUS at 17:49

## 2018-04-29 RX ADMIN — TIOTROPIUM BROMIDE 1 CAPSULE(S): 18 CAPSULE ORAL; RESPIRATORY (INHALATION) at 13:36

## 2018-04-29 RX ADMIN — CLOPIDOGREL BISULFATE 75 MILLIGRAM(S): 75 TABLET, FILM COATED ORAL at 13:35

## 2018-04-29 RX ADMIN — Medication 3 MILLILITER(S): at 06:49

## 2018-04-29 RX ADMIN — MIDODRINE HYDROCHLORIDE 10 MILLIGRAM(S): 2.5 TABLET ORAL at 06:15

## 2018-04-29 RX ADMIN — FINASTERIDE 5 MILLIGRAM(S): 5 TABLET, FILM COATED ORAL at 22:20

## 2018-04-29 RX ADMIN — Medication 1 APPLICATION(S): at 06:23

## 2018-04-29 RX ADMIN — MIDODRINE HYDROCHLORIDE 10 MILLIGRAM(S): 2.5 TABLET ORAL at 17:49

## 2018-04-29 RX ADMIN — Medication 1 MILLIGRAM(S): at 13:36

## 2018-04-29 RX ADMIN — Medication 200 MILLIGRAM(S): at 17:50

## 2018-04-29 RX ADMIN — BUDESONIDE AND FORMOTEROL FUMARATE DIHYDRATE 2 PUFF(S): 160; 4.5 AEROSOL RESPIRATORY (INHALATION) at 06:15

## 2018-04-29 RX ADMIN — Medication 100 MILLIGRAM(S): at 13:38

## 2018-04-29 RX ADMIN — MONTELUKAST 10 MILLIGRAM(S): 4 TABLET, CHEWABLE ORAL at 13:35

## 2018-04-29 RX ADMIN — Medication 81 MILLIGRAM(S): at 13:35

## 2018-04-29 RX ADMIN — HEPARIN SODIUM 5000 UNIT(S): 5000 INJECTION INTRAVENOUS; SUBCUTANEOUS at 06:21

## 2018-04-29 RX ADMIN — BUDESONIDE AND FORMOTEROL FUMARATE DIHYDRATE 2 PUFF(S): 160; 4.5 AEROSOL RESPIRATORY (INHALATION) at 17:49

## 2018-04-29 RX ADMIN — Medication 1 APPLICATION(S): at 17:49

## 2018-04-29 RX ADMIN — MIDODRINE HYDROCHLORIDE 10 MILLIGRAM(S): 2.5 TABLET ORAL at 13:35

## 2018-04-29 RX ADMIN — Medication 200 MILLIGRAM(S): at 06:15

## 2018-04-29 RX ADMIN — FLUDROCORTISONE ACETATE 0.1 MILLIGRAM(S): 0.1 TABLET ORAL at 06:15

## 2018-04-29 RX ADMIN — Medication 1 SPRAY(S): at 17:50

## 2018-04-29 RX ADMIN — Medication 1 SPRAY(S): at 06:22

## 2018-04-29 RX ADMIN — Medication 10 MILLIGRAM(S): at 06:15

## 2018-04-29 NOTE — PROGRESS NOTE ADULT - SUBJECTIVE AND OBJECTIVE BOX
MEJIA LATIF:[id number],   82yMale followed for:  penicillin (Rash)    PAST MEDICAL & SURGICAL HISTORY:  Coronary artery disease: s/p 3 stents on June 30, 2017 at [facility] Dr. Lavelle Reid  OA (osteoarthritis) of knee: right  Former smoker, stopped smoking in distant past  Rheumatoid arthritis  Seizure disorder: 1 episode approximately 15 yrs ago  Asthma  Hyperlipidemia  BPH (benign prostatic hyperplasia)  HTN (hypertension)  Esophagus cancer  Chronic allergic rhinitis  BCC (basal cell carcinoma): skin  Cerebrovascular accident (CVA)  Anxiety  Anastomotic leak following esophagectomy: [facility] logan esophagectomy  History of tonsillectomy  H/O heart artery stent: June 30, 2017  Knee arthropathy: left  History of total hip replacement: left  History of hernia repair    FAMILY HISTORY:  Family history of heart attack  Family history of pulmonary embolism: father @ 49 yr old  FH: CAD (coronary artery disease) (Sibling)    MEDICATIONS  (STANDING):  aspirin enteric coated 81 milliGRAM(s) Oral daily  BACItracin   Ointment 1 Application(s) Topical two times a day  buDESOnide  80 MICROgram(s)/formoterol 4.5 MICROgram(s) Inhaler 2 Puff(s) Inhalation two times a day  clopidogrel Tablet 75 milliGRAM(s) Oral daily  cyanocobalamin 500 MICROGram(s) Oral daily  finasteride 5 milliGRAM(s) Oral at bedtime  fludroCORTISONE 0.1 milliGRAM(s) Oral two times a day  folic acid 1 milliGRAM(s) Oral daily  heparin  Injectable 5000 Unit(s) SubCutaneous every 12 hours  midodrine 10 milliGRAM(s) Oral <User Schedule>  montelukast 10 milliGRAM(s) Oral daily  phenytoin   Capsule 200 milliGRAM(s) Oral two times a day  predniSONE   Tablet 10 milliGRAM(s) Oral daily  pyridoxine 100 milliGRAM(s) Oral daily  sodium chloride 0.65% Nasal 1 Spray(s) Both Nostrils four times a day  tiotropium 18 MICROgram(s) Capsule 1 Capsule(s) Inhalation daily    MEDICATIONS  (PRN):  acetaminophen   Tablet 650 milliGRAM(s) Oral every 6 hours PRN For Temp greater than 38 C (100.4 F)  ALBUTerol/ipratropium for Nebulization 3 milliLiter(s) Nebulizer every 6 hours PRN Shortness of Breath and/or Wheezing      Vital Signs Last 24 Hrs  T(C): 36.4 (29 Apr 2018 05:29), Max: 36.9 (28 Apr 2018 16:15)  T(F): 97.6 (29 Apr 2018 05:29), Max: 98.5 (28 Apr 2018 16:15)  HR: 69 (29 Apr 2018 05:29) (69 - 79)  BP: 104/61 (29 Apr 2018 05:29) (70/40 - 112/57)  BP(mean): --  RR: 18 (29 Apr 2018 05:29) (18 - 18)  SpO2: 96% (29 Apr 2018 05:29) (95% - 98%)  nc/at  s1s2  cta  soft, nt, nd no guarding or rebound  no c/c/e    CBC Full  -  ( 29 Apr 2018 08:10 )  WBC Count : 5.71 K/uL  Hemoglobin : 7.3 g/dL  Hematocrit : 22.6 %  Platelet Count - Automated : 157 K/uL  Mean Cell Volume : 95.0 fl  Mean Cell Hemoglobin : 30.7 pg  Mean Cell Hemoglobin Concentration : 32.3 gm/dL  Auto Neutrophil # : x  Auto Lymphocyte # : x  Auto Monocyte # : x  Auto Eosinophil # : x  Auto Basophil # : x  Auto Neutrophil % : x  Auto Lymphocyte % : x  Auto Monocyte % : x  Auto Eosinophil % : x  Auto Basophil % : x

## 2018-04-29 NOTE — PHYSICAL THERAPY INITIAL EVALUATION ADULT - BALANCE TRAINING, PT EVAL
GOAL: Pt will demonstrate improved static/dynamic balance to good in order to improve stability, decrease fall risk, and increase independence in ADLs within 2 weeks.

## 2018-04-29 NOTE — PROGRESS NOTE ADULT - SUBJECTIVE AND OBJECTIVE BOX
INTERVAL HPI/OVERNIGHT EVENTS:  Pt seen and examined at bedside.     Allergies/Intolerance: penicillin (Rash)      MEDICATIONS  (STANDING):  aspirin enteric coated 81 milliGRAM(s) Oral daily  BACItracin   Ointment 1 Application(s) Topical two times a day  buDESOnide  80 MICROgram(s)/formoterol 4.5 MICROgram(s) Inhaler 2 Puff(s) Inhalation two times a day  clopidogrel Tablet 75 milliGRAM(s) Oral daily  cyanocobalamin 500 MICROGram(s) Oral daily  finasteride 5 milliGRAM(s) Oral at bedtime  folic acid 1 milliGRAM(s) Oral daily  heparin  Injectable 5000 Unit(s) SubCutaneous every 12 hours  midodrine 10 milliGRAM(s) Oral <User Schedule>  montelukast 10 milliGRAM(s) Oral daily  phenytoin   Capsule 200 milliGRAM(s) Oral two times a day  predniSONE   Tablet 10 milliGRAM(s) Oral daily  pyridoxine 100 milliGRAM(s) Oral daily  sodium chloride 0.65% Nasal 1 Spray(s) Both Nostrils four times a day  tiotropium 18 MICROgram(s) Capsule 1 Capsule(s) Inhalation daily    MEDICATIONS  (PRN):  acetaminophen   Tablet 650 milliGRAM(s) Oral every 6 hours PRN For Temp greater than 38 C (100.4 F)  ALBUTerol/ipratropium for Nebulization 3 milliLiter(s) Nebulizer every 6 hours PRN Shortness of Breath and/or Wheezing        ROS: all systems reviewed and wnl      PHYSICAL EXAMINATION:  Vital Signs Last 24 Hrs  T(C): 36.3 (29 Apr 2018 10:52), Max: 36.9 (28 Apr 2018 16:15)  T(F): 97.4 (29 Apr 2018 10:52), Max: 98.5 (28 Apr 2018 16:15)  HR: 67 (29 Apr 2018 10:52) (67 - 79)  BP: 89/55 (29 Apr 2018 10:52) (89/55 - 112/57)  BP(mean): --  RR: 18 (29 Apr 2018 10:52) (18 - 18)  SpO2: 99% (29 Apr 2018 10:52) (95% - 99%)  CAPILLARY BLOOD GLUCOSE          04-28 @ 07:01  -  04-29 @ 07:00  --------------------------------------------------------  IN: 590 mL / OUT: 350 mL / NET: 240 mL    04-29 @ 07:01  -  04-29 @ 14:51  --------------------------------------------------------  IN: 240 mL / OUT: 200 mL / NET: 40 mL        GENERAL: in bed, NAD, comfortable, no SOB, CP earlier in day  NECK: supple, No JVD  CHEST/LUNG: clear to auscultation bilaterally; no rales, rhonchi, or wheezing b/l  HEART: normal S1, S2  ABDOMEN: BS+, soft, ND, NT   EXTREMITIES:  pulses palpable; no clubbing, cyanosis, or edema b/l LEs  SKIN: no rashes or lesions      LABS:                        7.3    5.71  )-----------( 157      ( 29 Apr 2018 08:10 )             22.6

## 2018-04-29 NOTE — PHYSICAL THERAPY INITIAL EVALUATION ADULT - PERTINENT HX OF CURRENT PROBLEM, REHAB EVAL
83M former smoker w/ hx of HTN, TIA, Seizures, RA, CAD s/p 6 stents most recently 4/17/18, Esophageal CA s/p chemo/RT & esophagogastrectomy recent admission for cardiac cath now presenting for altered mental status. Per pt, he was discharged 1 day PTA, was doing well until later in the day when his wife noticed he became more lethargic & had difficulty answering questions. He also reported substernal left sided chest pressure radiating down his left arm which lasted for a few minutes. (CONT)

## 2018-04-29 NOTE — PROGRESS NOTE ADULT - ASSESSMENT
pt  with  h/o htn, cad, stents, br  asthma, seizure, cva , ca esophagus,  c/c  cachexia, RA  on prednisone   s./p  recent cath,   with rico, to  LAD and angioplasty, D1, ON 4/17,  an d since then, had  low  bp,, and  was  on midodrine, on last admission  h/o  anemia , on prednisone,, for  RA,    low  bp,  stress dose  hydrocortisone complete.    does  not  appear to have  sepsis,    ID called  c/.c anemia  Pt  is dnr now,    echo  with modertae AS. / ?  severe   asa/ plavix   given recent pci  endo  dr lida cruz, for  low  bp,  and  on  c/c  prednisone 10 mg at home dose. florinef .1 mg bid. per  endo  pt  does  not have  adrenal insufficiency, ready advance diet, no artificial  feeding per  family.  and  pt wants  regular diet  hgb today improved after 1 unit to 7.3. Agree with second unit PRBC today. CBC in AM. No clear evidence  for GI bleeding. Occult blood testing ordered. Add Teds both legs. Monitor orthostatics. pt  with  h/o htn, cad, stents, br  asthma, seizure, cva , ca esophagus,  c/c  cachexia, RA  on prednisone   s./p  recent cath,   with rico, to  LAD and angioplasty, D1, ON 4/17,  an d since then, had  low  bp,, and  was  on midodrine, on last admission  h/o  anemia , on prednisone,, for  RA,    low  bp,  stress dose  hydrocortisone complete.    does  not  appear to have  sepsis,    ID called  c/.c anemia  Pt  is dnr now,    echo  with modertae AS. / ?  severe   asa/ plavix   given recent pci  endo  dr lida cruz, for  low  bp,  and  on  c/c  prednisone 10 mg at home dose. florinef .1 mg bid. per  endo  pt  does  not have  adrenal insufficiency, ready advance diet, no artificial  feeding per  family.  and  pt wants  regular diet  hgb today improved after 1 unit to 7.3. Agree with second unit PRBC today. CBC in AM. No clear evidence  for GI bleeding. Occult blood testing ordered. Add Teds both legs. Monitor orthostatics.  TSH elevated 10.92,  check free T-4 in AM.

## 2018-04-29 NOTE — PROGRESS NOTE ADULT - ASSESSMENT
82M CAD with multiple stents (most recently 4/2018), asthma, former smoker, esophageal cancer with esophagectomy and gastric pullup, RA on chronic steroids who presents with hypotension in the setting of secondary adrenal insufficiency and episodes of throat clearing/expectoration of blood - ?epistaxis vs. hemoptysis vs. GI source  As per patient and wife, he was having a similar issue in November 2017 (with symptoms of dysphagia) and underwent EGD with findings of Esophageal stricture at the esophageal-gastric conduit anastomosis site, dilation was performed successfully and patient's symptoms resolved.    Problem/Plan - 1:  ·  Problem: R/O Hemoptysis- do not believe that this is from a pulmonary source - ?GI source, h/o esophagectomy and in the setting of ASA/plavix  Plan: ENT following - laryngoscopy did not show any nasal or pharyngeal source of bleed. C/w nasal saline, avoid nose manipulation.   Continue to hold acapella today  F/u repeat post-transfusion CBC  I will reach out to Dr. Weinstein, Thoracic sx as well.     Problem/Plan - 2:  ·  Problem: Adrenal insufficiency due to steroid withdrawal- Stable BP Plan: endocrine/cards follow up-midodrine/fludrocortisone.     Problem/Plan - 3:  ·  Problem: Mild intermittent asthma without complication.  Plan: symbicort 80 bid  spiriva 1 puff q day  singulair 10mg q hs  Hold pulmonary toilet today    Problem/Plan - 4:  ·  Problem: Dysphagia, unspecified type.  Plan: GI prophylaxis:  PPI pre breakfast  aspiration precautions    Problem/Plan - 5:  ·  Problem: Coronary artery disease involving native coronary artery of native heart without angina pectoris.  Plan: Cardiology following, remains on ASA/Plavix     Problem/Plan - 6:  Problem: Prophylactic measure.  Plan: DVT prophlaxis:  Would consider d/c of Heparin with active bleed, c/w SCDs      Attending Attestation:   I was physically present for the key portions of the evaluation and management (E/M) service provided.  I agree with the above history, physical, and plan which I have reviewed and edited where appropriate.     Plan discussed with patient and wife at bedside. 82M CAD with multiple stents (most recently 4/2018), asthma, former smoker, esophageal cancer with esophagectomy and gastric pullup, RA on chronic steroids who presents with hypotension in the setting of secondary adrenal insufficiency and episodes of throat clearing/expectoration of blood - ?epistaxis vs. hemoptysis vs. GI source  As per patient and wife, he was having a similar issue in November 2017 (with symptoms of dysphagia) and underwent EGD with findings of Esophageal stricture at the esophageal-gastric conduit anastomosis site, dilation was performed successfully and patient's symptoms resolved.    Problem/Plan - 1:  ·  Problem: R/O Hemoptysis- do not believe that this is from a pulmonary source - ?GI source, h/o esophagectomy and in the setting of ASA/plavix  Plan: ENT following - laryngoscopy did not show any nasal or pharyngeal source of bleed. C/w nasal saline, avoid nose manipulation.   Continue to hold acapella today  F/u repeat post-transfusion CBC  Recommend Thoracic surgery eval (Dr. Christian Weinstein)      Problem/Plan - 2:  ·  Problem: Adrenal insufficiency due to steroid withdrawal- Stable BP Plan: endocrine/cards follow up-midodrine/fludrocortisone.     Problem/Plan - 3:  ·  Problem: Mild intermittent asthma without complication.  Plan: symbicort 80 bid  spiriva 1 puff q day  singulair 10mg q hs  Hold pulmonary toilet today    Problem/Plan - 4:  ·  Problem: Dysphagia, unspecified type.  Plan: GI prophylaxis:  PPI pre breakfast  aspiration precautions    Problem/Plan - 5:  ·  Problem: Coronary artery disease involving native coronary artery of native heart without angina pectoris.  Plan: Cardiology following, remains on ASA/Plavix     Problem/Plan - 6:  Problem: Prophylactic measure.  Plan: DVT prophlaxis:  Would consider d/c of Heparin with active bleed, c/w SCDs      Attending Attestation:   I was physically present for the key portions of the evaluation and management (E/M) service provided.  I agree with the above history, physical, and plan which I have reviewed and edited where appropriate.     Plan discussed with patient and wife at bedside. Also spoke with BAKARI Culp who will consult thoracic surgery.

## 2018-04-29 NOTE — PHYSICAL THERAPY INITIAL EVALUATION ADULT - PRECAUTIONS/LIMITATIONS, REHAB EVAL
fall precautions/CONT): Pt was concerned given the recent stent that this was another cardiac event and decided to come into the hospital. He denied any fevers/chills,nausea/vomiting,palpiotations,sweating,abdominal pain,diarrhea,melena. PT was recently hypotensive during previous admission and was started on hydrocortisone. Pt was transitioned from hydrocortisone to prednisone 20mg

## 2018-04-29 NOTE — PROGRESS NOTE ADULT - SUBJECTIVE AND OBJECTIVE BOX
[facility] - Division of Pulmonary, Critical Care and Sleep Medicine   Please call [phone] between 8am-pm weekdays, [phone] after hours and weekends    Interval Events: Acute anemia overnight, H/H to 6.5/19, s/p 1 U PRBCs, improved to 7.3/22. Receiving 2nd unit now - no other complaints. Denies chest pain or SOB.  Continues to have dark blood approx quarter size every time he coughs. States he feels a sensation in his throat that causes him to cough, otherwise not expectorating mucus.   Wife at bedside.    REVIEW OF SYSTEMS:  CV: [- ] chest pain   Resp: [+ ] cough [- ] shortness of breath   [x ] All other systems negative  [ ] Unable to assess ROS because ________    OBJECTIVE:  ICU Vital Signs Last 24 Hrs  T(C): 36.3 (29 Apr 2018 10:52), Max: 36.9 (28 Apr 2018 16:15)  T(F): 97.4 (29 Apr 2018 10:52), Max: 98.5 (28 Apr 2018 16:15)  HR: 67 (29 Apr 2018 10:52) (67 - 79)  BP: 89/55 (29 Apr 2018 10:52) (89/55 - 112/57)  BP(mean): --  ABP: --  ABP(mean): --  RR: 18 (29 Apr 2018 10:52) (18 - 18)  SpO2: 99% (29 Apr 2018 10:52) (95% - 99%)        04-28 @ 07:01 - 04-29 @ 07:00  --------------------------------------------------------  IN: 590 mL / OUT: 350 mL / NET: 240 mL    04-29 @ 07:01 - 04-29 @ 13:37  --------------------------------------------------------  IN: 240 mL / OUT: 200 mL / NET: 40 mL      CAPILLARY BLOOD GLUCOSE      PHYSICAL EXAM:  General: NAD  HEENT: NC/AT  Neck: supple  Respiratory:  CTA b/l, no wheezes, crackles or rhonchi  Cardiovascular:  RRR, no m/r/g  Abdomen: soft, NT/ND, +BS  Extremities: no clubbing, cyanosis or edema, warm  Skin: no rash  Neurological: AAOx3, non focal exam  Psychiatry: not anxious appearing, normal affect and mood    HOSPITAL MEDICATIONS:  MEDICATIONS  (STANDING):  aspirin enteric coated 81 milliGRAM(s) Oral daily  BACItracin   Ointment 1 Application(s) Topical two times a day  buDESOnide  80 MICROgram(s)/formoterol 4.5 MICROgram(s) Inhaler 2 Puff(s) Inhalation two times a day  clopidogrel Tablet 75 milliGRAM(s) Oral daily  cyanocobalamin 500 MICROGram(s) Oral daily  finasteride 5 milliGRAM(s) Oral at bedtime  folic acid 1 milliGRAM(s) Oral daily  heparin  Injectable 5000 Unit(s) SubCutaneous every 12 hours  midodrine 10 milliGRAM(s) Oral <User Schedule>  montelukast 10 milliGRAM(s) Oral daily  phenytoin   Capsule 200 milliGRAM(s) Oral two times a day  predniSONE   Tablet 10 milliGRAM(s) Oral daily  pyridoxine 100 milliGRAM(s) Oral daily  sodium chloride 0.65% Nasal 1 Spray(s) Both Nostrils four times a day  tiotropium 18 MICROgram(s) Capsule 1 Capsule(s) Inhalation daily    MEDICATIONS  (PRN):  acetaminophen   Tablet 650 milliGRAM(s) Oral every 6 hours PRN For Temp greater than 38 C (100.4 F)  ALBUTerol/ipratropium for Nebulization 3 milliLiter(s) Nebulizer every 6 hours PRN Shortness of Breath and/or Wheezing      LABS:                        7.3    5.71  )-----------( 157      ( 29 Apr 2018 08:10 )             22.6     Hgb Trend: 7.3<--, 7.1<--, 6.5<--, 8.4<--, 7.5<--        Creatinine Trend: 0.64<--, 0.61<--, 0.60<--, 0.68<--, 0.67<--, 0.73<--      MICROBIOLOGY:     RADIOLOGY:  [x ] Reviewed and interpreted by me  < from: CT Chest No Cont (04.26.18 @ 12:59) >  EXAM:  CT CHEST                            PROCEDURE DATE:  04/26/2018            INTERPRETATION:  CLINICAL INFORMATION: Hemoptysis.    COMPARISON: CT chest 12/7/2017, 10/13/2017.    PROCEDURE:   CT of the Chest was performed without intravenous contrast.  Sagittal and coronal reformats were performed as well as 3D (MIP)   reconstructions.      FINDINGS:    CHEST:     LUNGS AND LARGE AIRWAYS: Patent central airways with small amount of   debris in the right mainstem bronchus; no bronchiectasis. Nodular   opacities in the lower lungs bilaterally, right greater than left, with   scattered linear subsegmental atelectasis. Mild centrilobular emphysema.    PLEURA: Small pleural effusions bilaterally with partial loculation of   the right pleural effusion. There are mildly decreased compared to   12/7/2017.    VESSELS: Left-sided aortic arch with left-sided descending thoracic   aorta. Normal caliber of the aorta and main pulmonary artery.   Atheromatous disease of the aorta    HEART: Heart size is normal. No pericardial effusion. Aortic valve and   mitral annual calcifications. Coronary artery calcifications and coronary   artery stents.    MEDIASTINUM AND LAURA: No lymphadenopathy.    CHEST WALL AND LOWER NECK: 0.6 cm hypodense lesion in the left thyroid   lobe.    VISUALIZED UPPER ABDOMEN: Status post esophagectomy with gastric pull-up.   Right renal cysts.    MUSCULOSKELETAL: Degenerative changes of the spine with C6 vertebral body   height loss, unchanged.    IMPRESSION:    Nodular opacities in the lower lungs bilaterally, right greater than   left, with scattered linear subsegmental atelectasis likely representing   mucoid impaction and atelectasis. . The presence of a gastric   pull-through with accumulation of fluid and/ordebris within the   pull-through up to the level of the thoracic inlet also raises the   possibility of prior aspiration event.    Mildly decreased small pleural effusions bilaterally with redemonstration   of partial loculation of the right pleural effusion.      CHAYA PEÑA M.D., RADIOLOGY RESIDENT  This document has been electronically signed.  JT NEWTON M.D., ATTENDING RADIOLOGIST  This document has been electronically signed. Apr 26 2018  2:54PM        < end of copied text >    < from: Transthoracic Echocardiogram (04.21.18 @ 14:23) >  PROCEDURE: Transthoracic echocardiogram with 2-D, M-Mode  and complete spectral and color flow Doppler.  INDICATION: Hypertensive heart disease without heart  failure (I11.9)  ------------------------------------------------------------------------  Dimensions:    Normal Values:  LA:     4.4    2.0 - 4.0 cm  Ao:     2.7    2.0 - 3.8 cm  SEPTUM: 1.4    0.6- 1.2 cm  PWT:    1.4    0.6 - 1.1 cm  LVIDd:  3.8    3.0 - 5.6 cm  LVIDs:  2.6    1.8 - 4.0 cm  Derived variables:  LVMI: 124 g/m2  RWT: 0.73  Fractional short: 32 %  EF (Visual Estimate): 60 %  Doppler Peak Velocity (m/sec): AoV=2.9  ------------------------------------------------------------------------  Observations:  Mitral Valve: Mitral annular calcification, otherwise  normal mitral valve. Mild mitral regurgitation. Mean  transmitral valve gradient equals 4 mm Hg, consistent with  mild mitral stenosis. (HRabout 60s bpm)  Aortic Valve/Aorta: Calcified trileaflet aortic valve with  decreased opening. Peak transaortic valve gradient equals  34 mm Hg, mean transaortic valve gradient equals 19 mm Hg,  estimated aortic valve area equals 1.3sqcm (by continuity  equation), aortic valve velocity time integral equals 59  cm, consistent with moderate aortic stenosis. Aortic valve  assessment is limited by the presence of frequent PVCs and  the degree of aortic stenosis may be underestimated.  Visually, the aortic valve appears moderate-severely  stenotic. Consider additional imaging of the aortic valve  such as with additional TTE or TINO imaging if clinically  indicated. No aortic valve regurgitation seen. Peak left  ventricular outflowtract gradient equals 9 mm Hg, mean  gradient is equal to 4 mm Hg, LVOT velocity time integral  equals 24 cm.  Aortic Root: 2.7 cm.  Left Atrium: Severely dilated left atrium.  LA volume index  = 64 cc/m2.  Left Ventricle: Endocardium not well visualized; grossly  normal left ventricular systolic function. Septal motion  consistent with conduction defect. There is a false tendon  in the LV cavity (normal variant). Moderate concentric left  ventricular hypertrophy.  Right Heart: Right atrial enlargement. The right ventricle  is not well visualized; right ventricle appears enlarged  with normal systolic function. Tricuspid valve not well  visualized, probably normal. Mild tricuspid regurgitation.  Pulmonic valve not well visualized, probably normal.  Minimal pulmonic regurgitation.  Pericardium/Pleura: Normal pericardium with no pericardial  effusion.  Hemodynamic: Estimated right atrial pressure is 8 mm Hg.  Estimated right ventricular systolic pressure equals 39 mm  Hg, assuming right atrial pressure equals 8 mm Hg,  consistent with borderline pulmonary hypertension.  ------------------------------------------------------------------------  Conclusions:  1. Mitral annular calcification, otherwise normal mitral  valve. Mild mitral regurgitation. Mean transmitral valve  gradient equals 4 mm Hg, consistent with mild mitral  stenosis. (HRabout 60s bpm)  2. Calcified trileaflet aortic valve with decreased  opening. Peak transaortic valve gradient equals 34 mm Hg,  mean transaortic valve gradient equals 19 mm Hg, estimated  aortic valve area equals 1.3 sqcm (by continuity equation),  aortic valve velocity time integral equals 59 cm,  consistent with moderate aortic stenosis. Aortic valve  assessment is limited by the presence of frequent PVCs and  the degree of aortic stenosis may be underestimated.  Visually, the aortic valve appears moderate-severely  stenotic. Consider additional imaging of the aortic valve  such as with additional TTE or TINO imaging if clinically  indicated.No aortic valve regurgitation seen.  3. Severely dilated left atrium.  LA volume index = 64  cc/m2.  4. Endocardium not well visualized; grossly normal left  ventricular systolic function. Septal motion consistent  with conduction defect. There is a false tendon in the LV  cavity (normal variant).  5. The right ventricle is not well visualized; right  ventricle appears enlarged with normal systolic function.  *** Compared with echocardiogram of 9/17/2017, results are  similar on today's study. Aortic valve gradients are higher  on this study.  ------------------------------------------------------------------------  Confirmed on  4/21/2018 - 18:21:58 by Marcie Andrade M.D.    < end of copied text >

## 2018-04-29 NOTE — PROGRESS NOTE ADULT - SUBJECTIVE AND OBJECTIVE BOX
CARDIOLOGY     PROGRESS  NOTE   ________________________________________________    CHIEF COMPLAINT:Patient is a 82y old  Male who presents with a chief complaint of hypotension, hypothermia (23 Apr 2018 11:28)  doing better.  	  REVIEW OF SYSTEMS:  CONSTITUTIONAL: No fever, weight loss, or fatigue  EYES: No eye pain, visual disturbances, or discharge  ENT:  No difficulty hearing, tinnitus, vertigo; No sinus or throat pain  NECK: No pain or stiffness  RESPIRATORY: No cough, wheezing, chills or hemoptysis; No Shortness of Breath  CARDIOVASCULAR: No chest pain, palpitations, passing out, dizziness, or leg swelling  GASTROINTESTINAL: No abdominal or epigastric pain. No nausea, vomiting, or hematemesis; No diarrhea or constipation. No melena or hematochezia.  GENITOURINARY: No dysuria, frequency, hematuria, or incontinence  NEUROLOGICAL: No headaches, memory loss, loss of strength, numbness, or tremors  SKIN: No itching, burning, rashes, or lesions   LYMPH Nodes: No enlarged glands  ENDOCRINE: No heat or cold intolerance; No hair loss  MUSCULOSKELETAL: No joint pain or swelling; No muscle, back, or extremity pain  PSYCHIATRIC: No depression, anxiety, mood swings, or difficulty sleeping  HEME/LYMPH: No easy bruising, or bleeding gums  ALLERGY AND IMMUNOLOGIC: No hives or eczema	    [ ] All others negative	  [ ] Unable to obtain    PHYSICAL EXAM:  T(C): 36.4 (04-29-18 @ 05:29), Max: 36.9 (04-28-18 @ 16:15)  HR: 69 (04-29-18 @ 05:29) (69 - 79)  BP: 104/61 (04-29-18 @ 05:29) (70/40 - 112/57)  RR: 18 (04-29-18 @ 05:29) (18 - 18)  SpO2: 96% (04-29-18 @ 05:29) (95% - 98%)  Wt(kg): --  I&O's Summary    28 Apr 2018 07:01  -  29 Apr 2018 07:00  --------------------------------------------------------  IN: 590 mL / OUT: 350 mL / NET: 240 mL        Appearance: Normal	  HEENT:   Normal oral mucosa, PERRL, EOMI	  Lymphatic: No lymphadenopathy  Cardiovascular: Normal S1 S2, No JVD, + murmurs, No edema  Respiratory: Lungs clear to auscultation	  Psychiatry: A & O x 3, Mood & affect appropriate  Gastrointestinal:  Soft, Non-tender, + BS	  Skin: No rashes, No ecchymoses, No cyanosis	  Neurologic: Non-focal  Extremities: Normal range of motion, No clubbing, cyanosis or edema  Vascular: Peripheral pulses palpable 2+ bilaterally    MEDICATIONS  (STANDING):  aspirin enteric coated 81 milliGRAM(s) Oral daily  BACItracin   Ointment 1 Application(s) Topical two times a day  buDESOnide  80 MICROgram(s)/formoterol 4.5 MICROgram(s) Inhaler 2 Puff(s) Inhalation two times a day  clopidogrel Tablet 75 milliGRAM(s) Oral daily  cyanocobalamin 500 MICROGram(s) Oral daily  finasteride 5 milliGRAM(s) Oral at bedtime  fludroCORTISONE 0.1 milliGRAM(s) Oral two times a day  folic acid 1 milliGRAM(s) Oral daily  heparin  Injectable 5000 Unit(s) SubCutaneous every 12 hours  midodrine 10 milliGRAM(s) Oral <User Schedule>  montelukast 10 milliGRAM(s) Oral daily  phenytoin   Capsule 200 milliGRAM(s) Oral two times a day  predniSONE   Tablet 10 milliGRAM(s) Oral daily  pyridoxine 100 milliGRAM(s) Oral daily  sodium chloride 0.65% Nasal 1 Spray(s) Both Nostrils four times a day  tiotropium 18 MICROgram(s) Capsule 1 Capsule(s) Inhalation daily      TELEMETRY: 	    ECG:  	  RADIOLOGY:  OTHER: 	  	  LABS:	 	    CARDIAC MARKERS:  CARDIAC MARKERS ( 29 Apr 2018 07:06 )  x     / 0.09 ng/mL / x     / x     / 8.4 ng/mL  CARDIAC MARKERS ( 28 Apr 2018 21:17 )  x     / 0.08 ng/mL / 80 U/L / x     / 9.2 ng/mL  CARDIAC MARKERS ( 28 Apr 2018 13:56 )  x     / 0.09 ng/mL / 80 U/L / x     / 10.6 ng/mL                                7.3    5.71  )-----------( 157      ( 29 Apr 2018 08:10 )             22.6           proBNP:   Lipid Profile:   HgA1c:   TSH: Thyroid Stimulating Hormone, Serum: 7.80 uIU/mL (04-24 @ 07:51)  Thyroid Stimulating Hormone, Serum: 10.24 uIU/mL (04-21 @ 07:04)    < from: Transthoracic Echocardiogram (04.21.18 @ 14:23) >  1. Mitral annular calcification, otherwise normal mitral  valve. Mild mitral regurgitation. Mean transmitral valve  gradient equals 4 mm Hg, consistent with mild mitral  stenosis. (HRabout 60s bpm)  2. Calcified trileaflet aortic valve with decreased  opening. Peak transaortic valve gradient equals 34 mm Hg,  mean transaortic valve gradient equals 19 mm Hg, estimated  aortic valve area equals 1.3 sqcm (by continuity equation),  aortic valve velocity time integral equals 59 cm,  consistent with moderate aortic stenosis. Aortic valve  assessment is limited by the presence of frequent PVCs and  the degree of aortic stenosis may be underestimated.  Visually, the aortic valve appears moderate-severely  stenotic. Consider additional imaging of the aortic valve  such as with additional TTE or TINO imaging if clinically  indicated.No aortic valve regurgitation seen.  3. Severely dilated left atrium.  LA volume index = 64  cc/m2.  4. Endocardium not well visualized; grossly normal left  ventricular systolic function. Septal motion consistent  with conduction defect. There is a false tendon in the LV  cavity (normal variant).  5. The right ventricle is not well visualized; right  ventricle appears enlarged with normal systolic function.    < end of copied text >        Assessment and plan  ---------------------------  transfuse one unit prbc  check orthostatic  continue midodrine

## 2018-04-30 ENCOUNTER — APPOINTMENT (OUTPATIENT)
Dept: PULMONOLOGY | Facility: CLINIC | Age: 83
End: 2018-04-30

## 2018-04-30 DIAGNOSIS — R94.6 ABNORMAL RESULTS OF THYROID FUNCTION STUDIES: ICD-10-CM

## 2018-04-30 LAB
HCT VFR BLD CALC: 24.3 % — LOW (ref 39–50)
HCT VFR BLD CALC: 26.9 % — LOW (ref 39–50)
HGB BLD-MCNC: 8.1 G/DL — LOW (ref 13–17)
HGB BLD-MCNC: 9.4 G/DL — LOW (ref 13–17)
MCHC RBC-ENTMCNC: 31.2 PG — SIGNIFICANT CHANGE UP (ref 27–34)
MCHC RBC-ENTMCNC: 33.3 GM/DL — SIGNIFICANT CHANGE UP (ref 32–36)
MCHC RBC-ENTMCNC: 33.3 PG — SIGNIFICANT CHANGE UP (ref 27–34)
MCHC RBC-ENTMCNC: 34.8 GM/DL — SIGNIFICANT CHANGE UP (ref 32–36)
MCV RBC AUTO: 93.5 FL — SIGNIFICANT CHANGE UP (ref 80–100)
MCV RBC AUTO: 95.8 FL — SIGNIFICANT CHANGE UP (ref 80–100)
PLATELET # BLD AUTO: 156 K/UL — SIGNIFICANT CHANGE UP (ref 150–400)
PLATELET # BLD AUTO: 192 K/UL — SIGNIFICANT CHANGE UP (ref 150–400)
RBC # BLD: 2.6 M/UL — LOW (ref 4.2–5.8)
RBC # BLD: 2.81 M/UL — LOW (ref 4.2–5.8)
RBC # FLD: 16.5 % — HIGH (ref 10.3–14.5)
RBC # FLD: 18.8 % — HIGH (ref 10.3–14.5)
T4 FREE SERPL-MCNC: 0.7 NG/DL — LOW (ref 0.9–1.8)
WBC # BLD: 5 K/UL — SIGNIFICANT CHANGE UP (ref 3.8–10.5)
WBC # BLD: 8.1 K/UL — SIGNIFICANT CHANGE UP (ref 3.8–10.5)
WBC # FLD AUTO: 5 K/UL — SIGNIFICANT CHANGE UP (ref 3.8–10.5)
WBC # FLD AUTO: 8.1 K/UL — SIGNIFICANT CHANGE UP (ref 3.8–10.5)

## 2018-04-30 PROCEDURE — 99232 SBSQ HOSP IP/OBS MODERATE 35: CPT

## 2018-04-30 RX ORDER — LEVOTHYROXINE SODIUM 125 MCG
25 TABLET ORAL DAILY
Qty: 0 | Refills: 0 | Status: DISCONTINUED | OUTPATIENT
Start: 2018-04-30 | End: 2018-05-03

## 2018-04-30 RX ADMIN — Medication 1 SPRAY(S): at 05:48

## 2018-04-30 RX ADMIN — PREGABALIN 500 MICROGRAM(S): 225 CAPSULE ORAL at 12:04

## 2018-04-30 RX ADMIN — Medication 1 MILLIGRAM(S): at 12:04

## 2018-04-30 RX ADMIN — Medication 1 APPLICATION(S): at 17:29

## 2018-04-30 RX ADMIN — MIDODRINE HYDROCHLORIDE 10 MILLIGRAM(S): 2.5 TABLET ORAL at 06:30

## 2018-04-30 RX ADMIN — Medication 200 MILLIGRAM(S): at 05:42

## 2018-04-30 RX ADMIN — Medication 1 SPRAY(S): at 17:30

## 2018-04-30 RX ADMIN — HEPARIN SODIUM 5000 UNIT(S): 5000 INJECTION INTRAVENOUS; SUBCUTANEOUS at 17:30

## 2018-04-30 RX ADMIN — Medication 1 SPRAY(S): at 00:12

## 2018-04-30 RX ADMIN — Medication 100 MILLIGRAM(S): at 12:04

## 2018-04-30 RX ADMIN — MONTELUKAST 10 MILLIGRAM(S): 4 TABLET, CHEWABLE ORAL at 12:04

## 2018-04-30 RX ADMIN — Medication 1 SPRAY(S): at 12:05

## 2018-04-30 RX ADMIN — FINASTERIDE 5 MILLIGRAM(S): 5 TABLET, FILM COATED ORAL at 21:25

## 2018-04-30 RX ADMIN — BUDESONIDE AND FORMOTEROL FUMARATE DIHYDRATE 2 PUFF(S): 160; 4.5 AEROSOL RESPIRATORY (INHALATION) at 05:47

## 2018-04-30 RX ADMIN — Medication 1 SPRAY(S): at 21:25

## 2018-04-30 RX ADMIN — Medication 1 APPLICATION(S): at 05:48

## 2018-04-30 RX ADMIN — TIOTROPIUM BROMIDE 1 CAPSULE(S): 18 CAPSULE ORAL; RESPIRATORY (INHALATION) at 12:04

## 2018-04-30 RX ADMIN — MIDODRINE HYDROCHLORIDE 10 MILLIGRAM(S): 2.5 TABLET ORAL at 12:04

## 2018-04-30 RX ADMIN — BUDESONIDE AND FORMOTEROL FUMARATE DIHYDRATE 2 PUFF(S): 160; 4.5 AEROSOL RESPIRATORY (INHALATION) at 17:30

## 2018-04-30 RX ADMIN — Medication 81 MILLIGRAM(S): at 12:04

## 2018-04-30 RX ADMIN — Medication 200 MILLIGRAM(S): at 17:29

## 2018-04-30 RX ADMIN — CLOPIDOGREL BISULFATE 75 MILLIGRAM(S): 75 TABLET, FILM COATED ORAL at 12:04

## 2018-04-30 RX ADMIN — HEPARIN SODIUM 5000 UNIT(S): 5000 INJECTION INTRAVENOUS; SUBCUTANEOUS at 05:48

## 2018-04-30 RX ADMIN — MIDODRINE HYDROCHLORIDE 10 MILLIGRAM(S): 2.5 TABLET ORAL at 17:29

## 2018-04-30 RX ADMIN — Medication 10 MILLIGRAM(S): at 05:42

## 2018-04-30 NOTE — PROGRESS NOTE ADULT - ASSESSMENT
82M CAD with multiple stents (most recently 4/2018), asthma, former smoker, esophageal cancer with esophagectomy and gastric pullup, RA on chronic steroids who presents with hypotension in the setting of secondary adrenal insufficiency who now has had epistaxis with expectoration of blood (?hemoptysis vs hematemesis) and now with hypotension in the setting of decreased steroid dose    ENT evaluation-did not reveal any source\  CTS called 4/29

## 2018-04-30 NOTE — CONSULT NOTE ADULT - CONSULT REQUESTED BY NAME
Dr Covington
Dr. Covington
Dr. Covington
ED
Med Team
Medicine
dr byrd
dr saadia byrd/dheeraj
family
Mike

## 2018-04-30 NOTE — PROGRESS NOTE ADULT - ASSESSMENT
pt  with  h/o htn, cad, stents, br  asthma, seizure, cva , ca esophagus,  c/c  cachexia, RA  on prednisone   s./p  recent cath,   with rico, to  LAD and angioplasty, D1, ON 4/17,  an d since then, had  low  bp,, and  was  on midodrine, on last admission  h/o  anemia , on prednisone,, for  RA,    low  bp,  stress dose  hydrocortisone complete.    does  not  appear to have  sepsis,    ID called  c/.c anemia  Pt  is dnr now,    echo  with modertae AS. / ?  severe   asa/ plavix   given recent pci  endo  dr lida cruz, for  low  bp,  and  on  c/c  prednisone 10 mg at home dose. florinef .1 mg bid. per  endo  pt  does  not have  adrenal insufficiency, ready advance diet, no artificial  feeding per  family.  and  pt wants  regular diet  hgb today improved after 1 unit to 7.3. Agree with second unit PRBC today. CBC in AM. No clear evidence  for GI bleeding. Occult blood testing ordered. HGB higher today 9.4. Add Teds both legs. Monitor orthostatics.  TSH elevated 10.92,  check free T-4 in AM.     Discharge home if HGB stable.

## 2018-04-30 NOTE — CONSULT NOTE ADULT - SUBJECTIVE AND OBJECTIVE BOX
Thoracic Surgery Consult  Consulting surgical team: Thoracic Surgery  Consulting attending: Dr. Weinstein    HPI:84 y/o M former smoker w/ hx of HTN, TIA, Seizures, RA, CAD s/p 6 stents most recently 4/17/18 on ASA/plavix , Esophageal CA s/p chemo/RT and esophagogastrectomy with feeding jejunostomy (4/2017) complicated by anastamotic leak and stricture requiring dilation, recent admission for cardiac cath now presenting for altered mental status at home. As per patient and family he was discharged 1 day prior to admission and was doing well until later in the day when his wife noticed he became more lethargic and had difficulty answering questions. He also noticed that he was having substernal left sided chest pressure radiating down his left arm which lasted for a few minutes. He was concerned given the recent stent that this was another cardiac event and decided to come into the hospital.   Patient was noted to be anemic HH 6/19 on 4/28 and has required transfusion of 2 units PRBC in the past two days with inappropriate response to transfusions.  Thoracic surgery consulted for evaluation.    PAST MEDICAL HISTORY:  Coronary artery disease  OA (osteoarthritis) of knee  Former smoker, stopped smoking in distant past  Rheumatoid arthritis  Seizure disorder  Asthma  Hyperlipidemia  BPH (benign prostatic hyperplasia)  HTN (hypertension)  Esophageal cancer  Chronic allergic rhinitis  BCC (basal cell carcinoma)  Cerebrovascular accident (CVA)  Anxiety    PAST SURGICAL HISTORY:  Anastomotic leak following esophagectomy  History of tonsillectomy  H/O heart artery stent  Knee arthropathy  History of total hip replacement  History of hernia repair  s/p [facility] [facility] esophagectomy     ALLERGIES:  penicillin (Rash)    SOCIAL HISTORY:  former smoker non drinker no recreational drugs    FAMILY HISTORY: paternal PE, CAD; sibling with CAD    ROS:    CONSTITUTIONAL: + weakness  EYES/ENT: No visual changes;  No vertigo or throat pain   NECK: No pain or stiffness  RESPIRATORY: + hemoptysis   CARDIOVASCULAR: + chest pain  GASTROINTESTINAL: No abdominal or epigastric pain. No nausea, vomiting, or hematemesis; No diarrhea or constipation. No melena or hematochezia.  GENITOURINARY: No dysuria, frequency or hematuria  NEUROLOGICAL: No numbness or weakness  SKIN: No itching, burning, rashes, or lesions   All other review of systems is negative unless indicated above.    VITALS & I/Os:  Vital Signs Last 24 Hrs  T(C): 36.4 (30 Apr 2018 04:39), Max: 36.4 (29 Apr 2018 21:25)  T(F): 97.6 (30 Apr 2018 04:39), Max: 97.6 (29 Apr 2018 21:25)  HR: 73 (29 Apr 2018 21:25) (67 - 73)  BP: 104/61 (30 Apr 2018 04:39) (89/55 - 116/62)  RR: 18 (30 Apr 2018 04:39) (18 - 20)  SpO2: 99% (29 Apr 2018 21:25) (99% - 100%)      PHYSICAL EXAM:  General: No acute distress, appears nontoxic  Respiratory: Breathing unlabored  Cardiovascular: S1, S2  Abdominal: Soft, nondistended, nontender  Extremities: Warm, well perfused    LABS:                        8.1    5.00  )-----------( 156      ( 30 Apr 2018 09:29 )             24.3     IMAGING:  CT chest (4/26)  Nodular opacities in the lower lungs bilaterally, right greater than   left, with scattered linear subsegmental atelectasis likely representing   mucoid impaction and atelectasis. . The presence of a gastric   pull-through with accumulation of fluid and/or debris within the   pull-through up to the level of the thoracic inlet also raises the   possibility of prior aspiration event.    Mildly decreased small pleural effusions bilaterally with redemonstration   of partial loculation of the right pleural effusion. Thoracic Surgery Consult  Consulting surgical team: Thoracic Surgery  Consulting attending: Dr. Weinstein    HPI:84 y/o M former smoker w/ hx of HTN, TIA, Seizures, RA, CAD s/p 6 stents most recently 4/17/18 on ASA/plavix , Esophageal CA s/p chemo/RT and esophagogastrectomy with feeding jejunostomy (4/2017) complicated by anastamotic leak and stricture requiring dilation, recent admission for cardiac cath now presenting for altered mental status at home. As per patient and family he was discharged 1 day prior to admission and was doing well until later in the day when his wife noticed he became more lethargic and had difficulty answering questions. He also noticed that he was having substernal left sided chest pressure radiating down his left arm which lasted for a few minutes. He was concerned given the recent stent that this was another cardiac event and decided to come into the hospital.   Patient was noted to be anemic HH 6/19 on 4/28 and has required transfusion of 2 units PRBC in the past two days with inappropriate response to transfusions.  Thoracic surgery consulted for evaluation.    PAST MEDICAL HISTORY:  Coronary artery disease  OA (osteoarthritis) of knee  Former smoker, stopped smoking in distant past  Rheumatoid arthritis  Seizure disorder  Asthma  Hyperlipidemia  BPH (benign prostatic hyperplasia)  HTN (hypertension)  Esophageal cancer  Chronic allergic rhinitis  BCC (basal cell carcinoma)  Cerebrovascular accident (CVA)  Anxiety    PAST SURGICAL HISTORY:  Anastomotic leak following esophagectomy  History of tonsillectomy  H/O heart artery stent  Knee arthropathy  History of total hip replacement  History of hernia repair  s/p [facility] logan esophagectomy     ALLERGIES:  penicillin (Rash)    SOCIAL HISTORY:  former smoker non drinker no recreational drugs    FAMILY HISTORY: paternal PE, CAD; sibling with CAD    ROS:    CONSTITUTIONAL: + weakness  EYES/ENT: No visual changes;  No vertigo or throat pain   NECK: No pain or stiffness  RESPIRATORY: + hemoptysis   CARDIOVASCULAR: + chest pain  GASTROINTESTINAL: No abdominal or epigastric pain. No nausea, vomiting, or hematemesis; No diarrhea or constipation. No melena or hematochezia.  GENITOURINARY: No dysuria, frequency or hematuria  NEUROLOGICAL: No numbness or weakness  SKIN: No itching, burning, rashes, or lesions   All other review of systems is negative unless indicated above.    VITALS & I/Os:  Vital Signs Last 24 Hrs  T(C): 36.4 (30 Apr 2018 04:39), Max: 36.4 (29 Apr 2018 21:25)  T(F): 97.6 (30 Apr 2018 04:39), Max: 97.6 (29 Apr 2018 21:25)  HR: 73 (29 Apr 2018 21:25) (67 - 73)  BP: 104/61 (30 Apr 2018 04:39) (89/55 - 116/62)  RR: 18 (30 Apr 2018 04:39) (18 - 20)  SpO2: 99% (29 Apr 2018 21:25) (99% - 100%)      PHYSICAL EXAM:  General: No acute distress, appears nontoxic  Respiratory: Breathing unlabored  Cardiovascular: S1, S2  Abdominal: Soft, nondistended, nontender  Extremities: Warm, well perfused    LABS:                        8.1    5.00  )-----------( 156      ( 30 Apr 2018 09:29 )             24.3     FOBT (4/20) negative    IMAGING:  CT chest (4/26)  Nodular opacities in the lower lungs bilaterally, right greater than   left, with scattered linear subsegmental atelectasis likely representing   mucoid impaction and atelectasis. . The presence of a gastric   pull-through with accumulation of fluid and/or debris within the   pull-through up to the level of the thoracic inlet also raises the   possibility of prior aspiration event.    Mildly decreased small pleural effusions bilaterally with redemonstration   of partial loculation of the right pleural effusion. Thoracic Surgery Consult  Consulting surgical team: Thoracic Surgery  Consulting attending: Dr. Weinstein    HPI:84 y/o M former smoker w/ hx of HTN, TIA, Seizures, RA, CAD s/p 6 stents most recently 4/17/18 on ASA/plavix , Esophageal CA s/p chemo/RT and esophagogastrectomy with feeding jejunostomy (4/2017) complicated by anastamotic leak and stricture requiring dilation, recent admission for cardiac cath s/p ROJAS (4/2018) now presenting for altered mental status/lethargy and chest pain at home.  He was noted by hypotensive and was briefly in MICU for support, now improved and transferred to floor.  Patient had an episode of epistaxis a few days ago with subsequent hemoptysis.  He was then noted to be anemic HH 6/19 on 4/28 and has required transfusion of 2 units PRBC in the past two days.  HH is now 8/24.  He also has had a productive blood tinged cough for the past few days.  Thoracic surgery consulted for evaluation.    PAST MEDICAL HISTORY:  Coronary artery disease  OA (osteoarthritis) of knee  Former smoker, stopped smoking in distant past  Rheumatoid arthritis  Seizure disorder  Asthma  Hyperlipidemia  BPH (benign prostatic hyperplasia)  HTN (hypertension)  Esophageal cancer  Chronic allergic rhinitis  BCC (basal cell carcinoma)  Cerebrovascular accident (CVA)  Anxiety    PAST SURGICAL HISTORY:  Anastomotic leak following esophagectomy  History of tonsillectomy  H/O heart artery stent  Knee arthropathy  History of total hip replacement  History of hernia repair  s/p [place] logan esophagectomy     ALLERGIES:  penicillin (Rash)    SOCIAL HISTORY:  former smoker non drinker no recreational drugs    FAMILY HISTORY: paternal PE, CAD; sibling with CAD    ROS:    CONSTITUTIONAL: + weakness  EYES/ENT: No visual changes;  No vertigo or throat pain   NECK: No pain or stiffness  RESPIRATORY: + hemoptysis   CARDIOVASCULAR: + chest pain  GASTROINTESTINAL: No abdominal or epigastric pain. No nausea, vomiting, or hematemesis; No diarrhea or constipation. No melena or hematochezia.  GENITOURINARY: No dysuria, frequency or hematuria  NEUROLOGICAL: No numbness or weakness  SKIN: No itching, burning, rashes, or lesions   All other review of systems is negative unless indicated above.    VITALS & I/Os:  Vital Signs Last 24 Hrs  T(C): 36.4 (30 Apr 2018 04:39), Max: 36.4 (29 Apr 2018 21:25)  T(F): 97.6 (30 Apr 2018 04:39), Max: 97.6 (29 Apr 2018 21:25)  HR: 73 (29 Apr 2018 21:25) (67 - 73)  BP: 104/61 (30 Apr 2018 04:39) (89/55 - 116/62)  RR: 18 (30 Apr 2018 04:39) (18 - 20)  SpO2: 99% (29 Apr 2018 21:25) (99% - 100%)      PHYSICAL EXAM:  General: No acute distress, appears nontoxic  Respiratory: Breathing unlabored  Cardiovascular: S1, S2  Abdominal: Soft, nondistended, nontender  Extremities: Warm, well perfused    LABS:                        8.1    5.00  )-----------( 156      ( 30 Apr 2018 09:29 )             24.3     FOBT (4/20) negative    IMAGING:  CT chest (4/26)  Nodular opacities in the lower lungs bilaterally, right greater than   left, with scattered linear subsegmental atelectasis likely representing   mucoid impaction and atelectasis. . The presence of a gastric   pull-through with accumulation of fluid and/or debris within the   pull-through up to the level of the thoracic inlet also raises the   possibility of prior aspiration event.    Mildly decreased small pleural effusions bilaterally with redemonstration   of partial loculation of the right pleural effusion. Thoracic Surgery Consult  Consulting surgical team: Thoracic Surgery  Consulting attending: Dr. Weinstein    HPI:82 y/o M former smoker w/ hx of HTN, TIA, Seizures, RA, CAD s/p 6 stents most recently 4/17/18 on ASA/plavix , Esophageal CA s/p chemo/RT and esophagogastrectomy with feeding jejunostomy (4/2017) complicated by anastamotic leak and stricture requiring dilation, recent admission for cardiac cath s/p ROJAS (4/2018) now presenting for altered mental status/lethargy and chest pain at home.  He was noted by hypotensive and was briefly in MICU for support, now improved and transferred to floor.  Patient had an episode of epistaxis a few days ago with subsequent hemoptysis with clots.  He was then noted to be anemic HH 6/19 on 4/28 and has required transfusion of 2 units PRBC in the past two days.  HH is now 8/24.  He also has had a productive blood tinged cough for the past few days that has now resolved.  Thoracic surgery consulted for evaluation.    PAST MEDICAL HISTORY:  Coronary artery disease  OA (osteoarthritis) of knee  Former smoker, stopped smoking in distant past  Rheumatoid arthritis  Seizure disorder  Asthma  Hyperlipidemia  BPH (benign prostatic hyperplasia)  HTN (hypertension)  Esophageal cancer  Chronic allergic rhinitis  BCC (basal cell carcinoma)  Cerebrovascular accident (CVA)  Anxiety    PAST SURGICAL HISTORY:  Anastomotic leak following esophagectomy  History of tonsillectomy  H/O heart artery stent  Knee arthropathy  History of total hip replacement  History of hernia repair  s/p Vance logan esophagectomy     ALLERGIES:  penicillin (Rash)    SOCIAL HISTORY:  former smoker non drinker no recreational drugs    FAMILY HISTORY: paternal PE, CAD; sibling with CAD    ROS:    CONSTITUTIONAL: + weakness  EYES/ENT: No visual changes;  No vertigo or throat pain   NECK: No pain or stiffness  RESPIRATORY: + hemoptysis   CARDIOVASCULAR: + chest pain  GASTROINTESTINAL: No abdominal or epigastric pain. No nausea, vomiting; No diarrhea or constipation. No melena or hematochezia.  GENITOURINARY: No dysuria, frequency or hematuria  NEUROLOGICAL: No numbness or weakness  SKIN: No itching, burning, rashes, or lesions   All other review of systems is negative unless indicated above.    VITALS & I/Os:  Vital Signs Last 24 Hrs  T(C): 36.4 (30 Apr 2018 04:39), Max: 36.4 (29 Apr 2018 21:25)  T(F): 97.6 (30 Apr 2018 04:39), Max: 97.6 (29 Apr 2018 21:25)  HR: 73 (29 Apr 2018 21:25) (67 - 73)  BP: 104/61 (30 Apr 2018 04:39) (89/55 - 116/62)  RR: 18 (30 Apr 2018 04:39) (18 - 20)  SpO2: 99% (29 Apr 2018 21:25) (99% - 100%)      PHYSICAL EXAM:  General: No acute distress, appears nontoxic, alert and oriented  Respiratory: Breathing unlabored  Cardiovascular: S1, S2  Abdominal: Soft, nondistended, nontender  Extremities: Warm, well perfused    LABS:                        8.1    5.00  )-----------( 156      ( 30 Apr 2018 09:29 )             24.3     FOBT (4/20) negative    IMAGING:  CT chest (4/26)  Nodular opacities in the lower lungs bilaterally, right greater than   left, with scattered linear subsegmental atelectasis likely representing   mucoid impaction and atelectasis. . The presence of a gastric   pull-through with accumulation of fluid and/or debris within the   pull-through up to the level of the thoracic inlet also raises the   possibility of prior aspiration event.    Mildly decreased small pleural effusions bilaterally with redemonstration   of partial loculation of the right pleural effusion.

## 2018-04-30 NOTE — PROGRESS NOTE ADULT - ASSESSMENT
82 y/o Esophageal Ca. CAD recent admission with stent placement admitted with fatigue, hypotension, hypoglycemia, and hypothermia. Patient on chronic steroids for RA.    Patient says that in the past year he lost 45 lbs. following a diagnosis of esophageal Ca, adjuvant chemo/ RT and surgery 8/17. He had multiple stents placed during this time, recently 4/17. After discharge he was compliant with prednisone 5 mg daily, no fever, no chills, but noted worsening fatigue at night. He was admitted with complaints of chest pain, however on admission he was noted with hypothermia, hypotension and hypoglycemia. All resolved with stress dose steroids. He was started on epimeric antibiotics, but no clear source of infection noted.   Symptoms on admission resolved with steroids, except low BP- patient is off his BP meds, started on midodrine.  Still on high dose hydrocortisone.    TFT's abnormality mostly due to treatment with dilantin and recent iodine dye- no evidence for pituitary disease: Normal prolactin, Increased T3 uptake, dilantin displaces thyroid hormones from binding proteins.  However TSH remains high despite days elapsed from iodine load.    Patient transfused twice last few days for decreased H/H.  On full dose midodrine/florinef/ salt diet.  Comprsive stokings added.     Suggest:   Keep prednisone 10 mg days, as this was out-patient dose for RA.  On full dose florinef, midodrine, added salt diet.  Supportive care: Compressive stockings, nutritional support- added protein, Weight patient Q2days.   Supplement H/H.    Although elevated TSH is m/p from sick euthyroid, I see no problem starting low dose synthroid 25 mcg in the fatigued patient with persistent TSH elevation.

## 2018-04-30 NOTE — PROGRESS NOTE ADULT - ATTENDING COMMENTS
as above-  hemoptysis not localized to ENT source: CTS evaluation Dr. Christian Weinstein to consider FOB (no hemoptysis O/N)  Asthma/Chronic bronchitis rx as outlined above-acapella/incentive spirometry                Aspiration precautions  Cards/endo follow up-for BP  PT needed    William Juarez MD-Pulmonary   [phone]

## 2018-04-30 NOTE — PROGRESS NOTE ADULT - SUBJECTIVE AND OBJECTIVE BOX
CHIEF COMPLAINT: no blood in phlegm over the past 24 hours--good breathing-no pain any where--OOB    Interval Events: pending CTS evaln    REVIEW OF SYSTEMS:  Constitutional: No fevers or chills. No weight loss. No fatigue or generalized malaise.  Eyes: No itching or discharge from the eyes  ENT: No ear pain. No ear discharge. No nasal congestion. No post nasal drip. No epistaxis. No throat pain. No sore throat. No difficulty swallowing.   CV: No chest pain. No palpitations. No lightheadedness or dizziness.   Resp: No dyspnea at rest. No dyspnea on exertion. No orthopnea. No wheezing. + cough. No stridor. Clear sputum production. No chest pain with respiration.  GI: No nausea. No vomiting. No diarrhea.  MSK: No joint pain or pain in any extremities  Integumentary: No skin lesions. RLE pedal edema.  Neurological: No gross motor weakness. No sensory changes.  [+ ] All other systems negative  [ ] Unable to assess ROS because ________    OBJECTIVE:  ICU Vital Signs Last 24 Hrs  T(C): 36.4 (30 Apr 2018 04:39), Max: 36.4 (29 Apr 2018 05:29)  T(F): 97.6 (30 Apr 2018 04:39), Max: 97.6 (29 Apr 2018 05:29)  HR: 73 (29 Apr 2018 21:25) (67 - 73)  BP: 104/61 (30 Apr 2018 04:39) (89/55 - 116/62)  BP(mean): --  ABP: --  ABP(mean): --  RR: 18 (30 Apr 2018 04:39) (18 - 20)  SpO2: 99% (29 Apr 2018 21:25) (96% - 100%)        04-28 @ 07:01  -  04-29 @ 07:00  --------------------------------------------------------  IN: 590 mL / OUT: 350 mL / NET: 240 mL    04-29 @ 07:01 - 04-30 @ 05:21  --------------------------------------------------------  IN: 240 mL / OUT: 500 mL / NET: -260 mL      CAPILLARY BLOOD GLUCOSE          PHYSICAL EXAM: NAD in bed  General: Awake, alert, oriented X 3.   HEENT: Atraumatic, normocephalic.                 Mallampatti Grade 3                No nasal congestion.                No tonsillar or pharyngeal exudates.  Lymph Nodes: No palpable lymphadenopathy  Neck: No JVD. No carotid bruit.   Respiratory: Normal chest expansion                         Normal percussion                         Normal and equal air entry                         No wheeze, rhonchi or rales.  Cardiovascular: S1 S2 normal. No murmurs, rubs or gallops.   Abdomen: Soft, non-tender, non-distended. No organomegaly.  Extremities: Warm to touch. Peripheral pulse palpable. RLE pedal edema.   Skin: No rashes or skin lesions  Neurological: Motor and sensory examination equal and normal in all four extremities.  Psychiatry: Appropriate mood and affect.    HOSPITAL MEDICATIONS:  MEDICATIONS  (STANDING):  aspirin enteric coated 81 milliGRAM(s) Oral daily  BACItracin   Ointment 1 Application(s) Topical two times a day  buDESOnide  80 MICROgram(s)/formoterol 4.5 MICROgram(s) Inhaler 2 Puff(s) Inhalation two times a day  clopidogrel Tablet 75 milliGRAM(s) Oral daily  cyanocobalamin 500 MICROGram(s) Oral daily  finasteride 5 milliGRAM(s) Oral at bedtime  folic acid 1 milliGRAM(s) Oral daily  heparin  Injectable 5000 Unit(s) SubCutaneous every 12 hours  midodrine 10 milliGRAM(s) Oral <User Schedule>  montelukast 10 milliGRAM(s) Oral daily  phenytoin   Capsule 200 milliGRAM(s) Oral two times a day  predniSONE   Tablet 10 milliGRAM(s) Oral daily  pyridoxine 100 milliGRAM(s) Oral daily  sodium chloride 0.65% Nasal 1 Spray(s) Both Nostrils four times a day  tiotropium 18 MICROgram(s) Capsule 1 Capsule(s) Inhalation daily    MEDICATIONS  (PRN):  acetaminophen   Tablet 650 milliGRAM(s) Oral every 6 hours PRN For Temp greater than 38 C (100.4 F)  ALBUTerol/ipratropium for Nebulization 3 milliLiter(s) Nebulizer every 6 hours PRN Shortness of Breath and/or Wheezing      LABS:                        8.9    6.5   )-----------( 176      ( 29 Apr 2018 18:58 )             25.9                     MICROBIOLOGY:     RADIOLOGY:  [ ] Reviewed and interpreted by me    Point of Care Ultrasound Findings:    PFT:    EKG:

## 2018-04-30 NOTE — CONSULT NOTE ADULT - CONSULT REQUESTED DATE/TIME
21-Apr-2018
21-Apr-2018
21-Apr-2018 06:38
23-Apr-2018
23-Apr-2018 09:50
23-Apr-2018 20:06
24-Apr-2018 18:06
26-Apr-2018
26-Apr-2018
30-Apr-2018 10:13

## 2018-04-30 NOTE — PROGRESS NOTE ADULT - SUBJECTIVE AND OBJECTIVE BOX
MEJIA MCCORDBA:[id number],   82yMale followed for:  penicillin (Rash)    PAST MEDICAL & SURGICAL HISTORY:  Coronary artery disease: s/p 3 stents on June 30, 2017 at [place] Dr. Lavelle Reid  OA (osteoarthritis) of knee: right  Former smoker, stopped smoking in distant past  Rheumatoid arthritis  Seizure disorder: 1 episode approximately 15 yrs ago  Asthma  Hyperlipidemia  BPH (benign prostatic hyperplasia)  HTN (hypertension)  Esophagus cancer  Chronic allergic rhinitis  BCC (basal cell carcinoma): skin  Cerebrovascular accident (CVA)  Anxiety  Anastomotic leak following esophagectomy: [place] logan esophagectomy  History of tonsillectomy  H/O heart artery stent: June 30, 2017  Knee arthropathy: left  History of total hip replacement: left  History of hernia repair    FAMILY HISTORY:  Family history of heart attack  Family history of pulmonary embolism: father @ 49 yr old  FH: CAD (coronary artery disease) (Sibling)    MEDICATIONS  (STANDING):  aspirin enteric coated 81 milliGRAM(s) Oral daily  BACItracin   Ointment 1 Application(s) Topical two times a day  buDESOnide  80 MICROgram(s)/formoterol 4.5 MICROgram(s) Inhaler 2 Puff(s) Inhalation two times a day  clopidogrel Tablet 75 milliGRAM(s) Oral daily  cyanocobalamin 500 MICROGram(s) Oral daily  finasteride 5 milliGRAM(s) Oral at bedtime  folic acid 1 milliGRAM(s) Oral daily  heparin  Injectable 5000 Unit(s) SubCutaneous every 12 hours  midodrine 10 milliGRAM(s) Oral <User Schedule>  montelukast 10 milliGRAM(s) Oral daily  phenytoin   Capsule 200 milliGRAM(s) Oral two times a day  predniSONE   Tablet 10 milliGRAM(s) Oral daily  pyridoxine 100 milliGRAM(s) Oral daily  sodium chloride 0.65% Nasal 1 Spray(s) Both Nostrils four times a day  tiotropium 18 MICROgram(s) Capsule 1 Capsule(s) Inhalation daily    MEDICATIONS  (PRN):  acetaminophen   Tablet 650 milliGRAM(s) Oral every 6 hours PRN For Temp greater than 38 C (100.4 F)  ALBUTerol/ipratropium for Nebulization 3 milliLiter(s) Nebulizer every 6 hours PRN Shortness of Breath and/or Wheezing      Vital Signs Last 24 Hrs  T(C): 36.4 (30 Apr 2018 04:39), Max: 36.4 (29 Apr 2018 21:25)  T(F): 97.6 (30 Apr 2018 04:39), Max: 97.6 (29 Apr 2018 21:25)  HR: 73 (29 Apr 2018 21:25) (67 - 73)  BP: 104/61 (30 Apr 2018 04:39) (89/55 - 116/62)  BP(mean): --  RR: 18 (30 Apr 2018 04:39) (18 - 20)  SpO2: 99% (29 Apr 2018 21:25) (99% - 100%)  nc/at  s1s2  cta  soft, nt, nd no guarding or rebound  no c/c/e    CBC Full  -  ( 29 Apr 2018 18:58 )  WBC Count : 6.5 K/uL  Hemoglobin : 8.9 g/dL  Hematocrit : 25.9 %  Platelet Count - Automated : 176 K/uL  Mean Cell Volume : 94.7 fl  Mean Cell Hemoglobin : 32.5 pg  Mean Cell Hemoglobin Concentration : 34.4 gm/dL  Auto Neutrophil # : x  Auto Lymphocyte # : x  Auto Monocyte # : x  Auto Eosinophil # : x  Auto Basophil # : x  Auto Neutrophil % : x  Auto Lymphocyte % : x  Auto Monocyte % : x  Auto Eosinophil % : x  Auto Basophil % : x

## 2018-04-30 NOTE — PROGRESS NOTE ADULT - SUBJECTIVE AND OBJECTIVE BOX
INTERVAL HPI/OVERNIGHT EVENTS:  Pt seen and examined at bedside.     Allergies/Intolerance: penicillin (Rash)      MEDICATIONS  (STANDING):  aspirin enteric coated 81 milliGRAM(s) Oral daily  BACItracin   Ointment 1 Application(s) Topical two times a day  buDESOnide  80 MICROgram(s)/formoterol 4.5 MICROgram(s) Inhaler 2 Puff(s) Inhalation two times a day  clopidogrel Tablet 75 milliGRAM(s) Oral daily  cyanocobalamin 500 MICROGram(s) Oral daily  finasteride 5 milliGRAM(s) Oral at bedtime  folic acid 1 milliGRAM(s) Oral daily  heparin  Injectable 5000 Unit(s) SubCutaneous every 12 hours  midodrine 10 milliGRAM(s) Oral <User Schedule>  montelukast 10 milliGRAM(s) Oral daily  phenytoin   Capsule 200 milliGRAM(s) Oral two times a day  predniSONE   Tablet 10 milliGRAM(s) Oral daily  pyridoxine 100 milliGRAM(s) Oral daily  sodium chloride 0.65% Nasal 1 Spray(s) Both Nostrils four times a day  tiotropium 18 MICROgram(s) Capsule 1 Capsule(s) Inhalation daily    MEDICATIONS  (PRN):  acetaminophen   Tablet 650 milliGRAM(s) Oral every 6 hours PRN For Temp greater than 38 C (100.4 F)  ALBUTerol/ipratropium for Nebulization 3 milliLiter(s) Nebulizer every 6 hours PRN Shortness of Breath and/or Wheezing        ROS: all systems reviewed and wnl      PHYSICAL EXAMINATION:  Vital Signs Last 24 Hrs  T(C): 36.4 (30 Apr 2018 04:39), Max: 36.4 (29 Apr 2018 21:25)  T(F): 97.6 (30 Apr 2018 04:39), Max: 97.6 (29 Apr 2018 21:25)  HR: 73 (29 Apr 2018 21:25) (67 - 73)  BP: 104/61 (30 Apr 2018 04:39) (89/55 - 116/62)  BP(mean): --  RR: 18 (30 Apr 2018 04:39) (18 - 20)  SpO2: 99% (29 Apr 2018 21:25) (99% - 100%)  CAPILLARY BLOOD GLUCOSE          04-29 @ 07:01  -  04-30 @ 07:00  --------------------------------------------------------  IN: 240 mL / OUT: 600 mL / NET: -360 mL        GENERAL:   NECK: supple, No JVD  CHEST/LUNG: clear to auscultation bilaterally; no rales, rhonchi, or wheezing b/l  HEART: normal S1, S2  ABDOMEN: BS+, soft, ND, NT   EXTREMITIES:  pulses palpable; no clubbing, cyanosis, or edema b/l LEs  SKIN: no rashes or lesions      LABS:                        8.9    6.5   )-----------( 176      ( 29 Apr 2018 18:58 )             25.9 INTERVAL HPI/OVERNIGHT EVENTS:  Pt seen and examined at bedside.     Allergies/Intolerance: penicillin (Rash)      MEDICATIONS  (STANDING):  aspirin enteric coated 81 milliGRAM(s) Oral daily  BACItracin   Ointment 1 Application(s) Topical two times a day  buDESOnide  80 MICROgram(s)/formoterol 4.5 MICROgram(s) Inhaler 2 Puff(s) Inhalation two times a day  clopidogrel Tablet 75 milliGRAM(s) Oral daily  cyanocobalamin 500 MICROGram(s) Oral daily  finasteride 5 milliGRAM(s) Oral at bedtime  folic acid 1 milliGRAM(s) Oral daily  heparin  Injectable 5000 Unit(s) SubCutaneous every 12 hours  midodrine 10 milliGRAM(s) Oral <User Schedule>  montelukast 10 milliGRAM(s) Oral daily  phenytoin   Capsule 200 milliGRAM(s) Oral two times a day  predniSONE   Tablet 10 milliGRAM(s) Oral daily  pyridoxine 100 milliGRAM(s) Oral daily  sodium chloride 0.65% Nasal 1 Spray(s) Both Nostrils four times a day  tiotropium 18 MICROgram(s) Capsule 1 Capsule(s) Inhalation daily    MEDICATIONS  (PRN):  acetaminophen   Tablet 650 milliGRAM(s) Oral every 6 hours PRN For Temp greater than 38 C (100.4 F)  ALBUTerol/ipratropium for Nebulization 3 milliLiter(s) Nebulizer every 6 hours PRN Shortness of Breath and/or Wheezing        ROS: all systems reviewed and wnl      PHYSICAL EXAMINATION:  Vital Signs Last 24 Hrs  T(C): 36.4 (30 Apr 2018 04:39), Max: 36.4 (29 Apr 2018 21:25)  T(F): 97.6 (30 Apr 2018 04:39), Max: 97.6 (29 Apr 2018 21:25)  HR: 73 (29 Apr 2018 21:25) (67 - 73)  BP: 104/61 (30 Apr 2018 04:39) (89/55 - 116/62)  BP(mean): --  RR: 18 (30 Apr 2018 04:39) (18 - 20)  SpO2: 99% (29 Apr 2018 21:25) (99% - 100%)  CAPILLARY BLOOD GLUCOSE          04-29 @ 07:01  -  04-30 @ 07:00  --------------------------------------------------------  IN: 240 mL / OUT: 600 mL / NET: -360 mL        GENERAL: stable, alert, comfortable, no melena or BRBPR  NECK: supple, No JVD  CHEST/LUNG: clear to auscultation bilaterally; no rales, rhonchi, or wheezing b/l  HEART: normal S1, S2  ABDOMEN: BS+, soft, ND, NT   EXTREMITIES:  pulses palpable; no clubbing, cyanosis, or edema b/l LEs  SKIN: no rashes or lesions      LABS:                        8.9    6.5   )-----------( 176      ( 29 Apr 2018 18:58 )             25.9

## 2018-04-30 NOTE — CONSULT NOTE ADULT - ASSESSMENT
83 year old man with hx esophageal cancer s/p maryjane logan esophagogastrectomy (4/2017) complicated by anastamotic leak and stricture s/p dilation, CAD s/p multiple stents on ASA/plavix, with hemoptysis and anemia requiring blood transfusions.    - Recommendations to follow    JT Reyes MD PGY3 #[id number] 83 year old man with hx esophageal cancer s/p maryjane logan esophagogastrectomy (4/2017) complicated by anastamotic leak and stricture s/p dilation, CAD s/p multiple stents on ASA/plavix, with hemoptysis and anemia requiring blood transfusions, stabilized.    - No acute thoracic surgery intervention at this time  - Patient should follow up as outpatient with Dr. Weinstein  - Discussed with Dr. Js Reyes MD PGY3 #[id number]

## 2018-04-30 NOTE — PROGRESS NOTE ADULT - SUBJECTIVE AND OBJECTIVE BOX
CARDIOLOGY     PROGRESS  NOTE   ________________________________________________    CHIEF COMPLAINT:Patient is a 82y old  Male who presents with a chief complaint of hypotension, hypothermia (23 Apr 2018 11:28)  doing better.  	  REVIEW OF SYSTEMS:  CONSTITUTIONAL: No fever, weight loss, or fatigue  EYES: No eye pain, visual disturbances, or discharge  ENT:  No difficulty hearing, tinnitus, vertigo; No sinus or throat pain  NECK: No pain or stiffness  RESPIRATORY: No cough, wheezing, chills or hemoptysis; No Shortness of Breath  CARDIOVASCULAR: No chest pain, palpitations, passing out, dizziness, or leg swelling  GASTROINTESTINAL: No abdominal or epigastric pain. No nausea, vomiting, or hematemesis; No diarrhea or constipation. No melena or hematochezia.  GENITOURINARY: No dysuria, frequency, hematuria, or incontinence  NEUROLOGICAL: No headaches, memory loss, loss of strength, numbness, or tremors  SKIN: No itching, burning, rashes, or lesions   LYMPH Nodes: No enlarged glands  ENDOCRINE: No heat or cold intolerance; No hair loss  MUSCULOSKELETAL: No joint pain or swelling; No muscle, back, or extremity pain  PSYCHIATRIC: No depression, anxiety, mood swings, or difficulty sleeping  HEME/LYMPH: No easy bruising, or bleeding gums  ALLERGY AND IMMUNOLOGIC: No hives or eczema	    [ ] All others negative	  [ ] Unable to obtain    PHYSICAL EXAM:  T(C): 36.4 (04-30-18 @ 04:39), Max: 36.4 (04-29-18 @ 21:25)  HR: 73 (04-29-18 @ 21:25) (67 - 73)  BP: 104/61 (04-30-18 @ 04:39) (89/55 - 116/62)  RR: 18 (04-30-18 @ 04:39) (18 - 20)  SpO2: 99% (04-29-18 @ 21:25) (99% - 100%)  Wt(kg): --  I&O's Summary    29 Apr 2018 07:01  -  30 Apr 2018 07:00  --------------------------------------------------------  IN: 240 mL / OUT: 600 mL / NET: -360 mL        Appearance: Normal	  HEENT:   Normal oral mucosa, PERRL, EOMI	  Lymphatic: No lymphadenopathy  Cardiovascular: Normal S1 S2, No JVD, + murmurs, No edema  Respiratory: Lungs clear to auscultation	  Psychiatry: A & O x 3, Mood & affect appropriate  Gastrointestinal:  Soft, Non-tender, + BS	  Skin: No rashes, No ecchymoses, No cyanosis	  Neurologic: Non-focal  Extremities: Normal range of motion, No clubbing, cyanosis or edema  Vascular: Peripheral pulses palpable 2+ bilaterally    MEDICATIONS  (STANDING):  aspirin enteric coated 81 milliGRAM(s) Oral daily  BACItracin   Ointment 1 Application(s) Topical two times a day  buDESOnide  80 MICROgram(s)/formoterol 4.5 MICROgram(s) Inhaler 2 Puff(s) Inhalation two times a day  clopidogrel Tablet 75 milliGRAM(s) Oral daily  cyanocobalamin 500 MICROGram(s) Oral daily  finasteride 5 milliGRAM(s) Oral at bedtime  folic acid 1 milliGRAM(s) Oral daily  heparin  Injectable 5000 Unit(s) SubCutaneous every 12 hours  midodrine 10 milliGRAM(s) Oral <User Schedule>  montelukast 10 milliGRAM(s) Oral daily  phenytoin   Capsule 200 milliGRAM(s) Oral two times a day  predniSONE   Tablet 10 milliGRAM(s) Oral daily  pyridoxine 100 milliGRAM(s) Oral daily  sodium chloride 0.65% Nasal 1 Spray(s) Both Nostrils four times a day  tiotropium 18 MICROgram(s) Capsule 1 Capsule(s) Inhalation daily      TELEMETRY: 	    ECG:  	  RADIOLOGY:  OTHER: 	  	  LABS:	 	    CARDIAC MARKERS:  CARDIAC MARKERS ( 29 Apr 2018 07:06 )  x     / 0.09 ng/mL / x     / x     / 8.4 ng/mL  CARDIAC MARKERS ( 28 Apr 2018 21:17 )  x     / 0.08 ng/mL / 80 U/L / x     / 9.2 ng/mL  CARDIAC MARKERS ( 28 Apr 2018 13:56 )  x     / 0.09 ng/mL / 80 U/L / x     / 10.6 ng/mL                                8.9    6.5   )-----------( 176      ( 29 Apr 2018 18:58 )             25.9           proBNP:   Lipid Profile:   HgA1c:   TSH: Thyroid Stimulating Hormone, Serum: 10.92 uIU/mL (04-29 @ 08:03)  Thyroid Stimulating Hormone, Serum: 7.80 uIU/mL (04-24 @ 07:51)  Thyroid Stimulating Hormone, Serum: 10.24 uIU/mL (04-21 @ 07:04)          Assessment and plan  ---------------------------  doing better post transfusion  may consider EGD will discuss with GI  keep Hgb >8  continue midodrine  continue asa and plavix

## 2018-04-30 NOTE — PROGRESS NOTE ADULT - SUBJECTIVE AND OBJECTIVE BOX
Chief Complaint: 82 y/o Esophageal Ca. CAD recent admission with stent placement admitted with fatigue, hypotension, hypoglycemia, and hypothermia. Patient on chronic steroids for RA.    Patient transfused again (2nd unit) for decreased H/H.  Compressive stockings.  PO intake better, still small amounts.  Salt diet liberalized On Florinef / midodrine  full dose.  TSH remains high set low free T4.     MEDICATIONS  (STANDING):  aspirin enteric coated 81 milliGRAM(s) Oral daily  BACItracin   Ointment 1 Application(s) Topical two times a day  buDESOnide  80 MICROgram(s)/formoterol 4.5 MICROgram(s) Inhaler 2 Puff(s) Inhalation two times a day  clopidogrel Tablet 75 milliGRAM(s) Oral daily  cyanocobalamin 500 MICROGram(s) Oral daily  finasteride 5 milliGRAM(s) Oral at bedtime  folic acid 1 milliGRAM(s) Oral daily  heparin  Injectable 5000 Unit(s) SubCutaneous every 12 hours  midodrine 10 milliGRAM(s) Oral <User Schedule>  montelukast 10 milliGRAM(s) Oral daily  phenytoin   Capsule 200 milliGRAM(s) Oral two times a day  predniSONE   Tablet 10 milliGRAM(s) Oral daily  pyridoxine 100 milliGRAM(s) Oral daily  sodium chloride 0.65% Nasal 1 Spray(s) Both Nostrils four times a day  tiotropium 18 MICROgram(s) Capsule 1 Capsule(s) Inhalation daily    MEDICATIONS  (PRN):  acetaminophen   Tablet 650 milliGRAM(s) Oral every 6 hours PRN For Temp greater than 38 C (100.4 F)  ALBUTerol/ipratropium for Nebulization 3 milliLiter(s) Nebulizer every 6 hours PRN Shortness of Breath and/or Wheezing      Allergies    penicillin (Rash)    Intolerances        PHYSICAL EXAM:  VITALS: T(C): 36.5 (04-30-18 @ 12:38)  T(F): 97.7 (04-30-18 @ 12:38), Max: 97.7 (04-30-18 @ 12:38)  HR: 65 (04-30-18 @ 12:38) (65 - 73)  BP: 104/52 (04-30-18 @ 12:38) (104/52 - 115/66)  RR:  (18 - 18)  SpO2:  (96% - 99%)  GENERAL: NAD, fatigued  EYES: No proptosis,  HEENT:  Atraumatic, Normocephalic,   THYROID: Normal size, no palpable nodules  RESPIRATORY: Clear to auscultation bilaterally, decreased bases.  CARDIOVASCULAR: Regular rhythm; No murmurs; no peripheral edema  GI: Soft, nontender, non distended, normal bowel sounds  SKIN: Dry, intact, chronic edema left.  MUSCULOSKELETAL: normal strength  NEURO: extraocular movements intact, no tremor, normal reflexes  PSYCH: Alert and oriented x 3, normal affect, normal mood                                9.4    8.1   )-----------( 192      ( 30 Apr 2018 13:58 )             26.9               Thyroid Function Tests:  04-30 @ 09:24 TSH -- FreeT4 0.7 T3 -- Anti TPO -- Anti Thyroglobulin Ab -- TSI --  04-29 @ 08:03 TSH 10.92 FreeT4 -- T3 -- Anti TPO -- Anti Thyroglobulin Ab -- TSI --

## 2018-05-01 DIAGNOSIS — R04.2 HEMOPTYSIS: ICD-10-CM

## 2018-05-01 LAB
ANION GAP SERPL CALC-SCNC: 8 MMOL/L — SIGNIFICANT CHANGE UP (ref 5–17)
BUN SERPL-MCNC: 22 MG/DL — SIGNIFICANT CHANGE UP (ref 7–23)
CALCIUM SERPL-MCNC: 8.9 MG/DL — SIGNIFICANT CHANGE UP (ref 8.4–10.5)
CHLORIDE SERPL-SCNC: 106 MMOL/L — SIGNIFICANT CHANGE UP (ref 96–108)
CO2 SERPL-SCNC: 28 MMOL/L — SIGNIFICANT CHANGE UP (ref 22–31)
CREAT SERPL-MCNC: 0.59 MG/DL — SIGNIFICANT CHANGE UP (ref 0.5–1.3)
GLUCOSE SERPL-MCNC: 69 MG/DL — LOW (ref 70–99)
HCT VFR BLD CALC: 25.8 % — LOW (ref 39–50)
HGB BLD-MCNC: 8.4 G/DL — LOW (ref 13–17)
MCHC RBC-ENTMCNC: 31 PG — SIGNIFICANT CHANGE UP (ref 27–34)
MCHC RBC-ENTMCNC: 32.6 GM/DL — SIGNIFICANT CHANGE UP (ref 32–36)
MCV RBC AUTO: 95.2 FL — SIGNIFICANT CHANGE UP (ref 80–100)
OB PNL STL: POSITIVE
PLATELET # BLD AUTO: 160 K/UL — SIGNIFICANT CHANGE UP (ref 150–400)
POTASSIUM SERPL-MCNC: 3.6 MMOL/L — SIGNIFICANT CHANGE UP (ref 3.5–5.3)
POTASSIUM SERPL-SCNC: 3.6 MMOL/L — SIGNIFICANT CHANGE UP (ref 3.5–5.3)
RBC # BLD: 2.71 M/UL — LOW (ref 4.2–5.8)
RBC # FLD: 19.1 % — HIGH (ref 10.3–14.5)
SODIUM SERPL-SCNC: 142 MMOL/L — SIGNIFICANT CHANGE UP (ref 135–145)
WBC # BLD: 4.41 K/UL — SIGNIFICANT CHANGE UP (ref 3.8–10.5)
WBC # FLD AUTO: 4.41 K/UL — SIGNIFICANT CHANGE UP (ref 3.8–10.5)

## 2018-05-01 PROCEDURE — 99232 SBSQ HOSP IP/OBS MODERATE 35: CPT

## 2018-05-01 RX ORDER — HYDROXYZINE HCL 10 MG
10 TABLET ORAL ONCE
Qty: 0 | Refills: 0 | Status: COMPLETED | OUTPATIENT
Start: 2018-05-01 | End: 2018-05-01

## 2018-05-01 RX ADMIN — Medication 1 SPRAY(S): at 23:08

## 2018-05-01 RX ADMIN — Medication 100 MILLIGRAM(S): at 12:45

## 2018-05-01 RX ADMIN — Medication 1 SPRAY(S): at 18:31

## 2018-05-01 RX ADMIN — Medication 200 MILLIGRAM(S): at 05:20

## 2018-05-01 RX ADMIN — MIDODRINE HYDROCHLORIDE 10 MILLIGRAM(S): 2.5 TABLET ORAL at 06:26

## 2018-05-01 RX ADMIN — Medication 1 APPLICATION(S): at 05:21

## 2018-05-01 RX ADMIN — MONTELUKAST 10 MILLIGRAM(S): 4 TABLET, CHEWABLE ORAL at 12:45

## 2018-05-01 RX ADMIN — FINASTERIDE 5 MILLIGRAM(S): 5 TABLET, FILM COATED ORAL at 21:28

## 2018-05-01 RX ADMIN — Medication 81 MILLIGRAM(S): at 12:45

## 2018-05-01 RX ADMIN — Medication 1 APPLICATION(S): at 18:30

## 2018-05-01 RX ADMIN — Medication 1 MILLIGRAM(S): at 12:45

## 2018-05-01 RX ADMIN — MIDODRINE HYDROCHLORIDE 10 MILLIGRAM(S): 2.5 TABLET ORAL at 12:45

## 2018-05-01 RX ADMIN — Medication 25 MICROGRAM(S): at 05:20

## 2018-05-01 RX ADMIN — Medication 1 SPRAY(S): at 12:45

## 2018-05-01 RX ADMIN — BUDESONIDE AND FORMOTEROL FUMARATE DIHYDRATE 2 PUFF(S): 160; 4.5 AEROSOL RESPIRATORY (INHALATION) at 05:20

## 2018-05-01 RX ADMIN — Medication 1 SPRAY(S): at 05:21

## 2018-05-01 RX ADMIN — BUDESONIDE AND FORMOTEROL FUMARATE DIHYDRATE 2 PUFF(S): 160; 4.5 AEROSOL RESPIRATORY (INHALATION) at 18:31

## 2018-05-01 RX ADMIN — MIDODRINE HYDROCHLORIDE 10 MILLIGRAM(S): 2.5 TABLET ORAL at 18:31

## 2018-05-01 RX ADMIN — HEPARIN SODIUM 5000 UNIT(S): 5000 INJECTION INTRAVENOUS; SUBCUTANEOUS at 18:31

## 2018-05-01 RX ADMIN — PREGABALIN 500 MICROGRAM(S): 225 CAPSULE ORAL at 12:45

## 2018-05-01 RX ADMIN — Medication 10 MILLIGRAM(S): at 05:20

## 2018-05-01 RX ADMIN — TIOTROPIUM BROMIDE 1 CAPSULE(S): 18 CAPSULE ORAL; RESPIRATORY (INHALATION) at 12:45

## 2018-05-01 RX ADMIN — HEPARIN SODIUM 5000 UNIT(S): 5000 INJECTION INTRAVENOUS; SUBCUTANEOUS at 05:20

## 2018-05-01 RX ADMIN — Medication 200 MILLIGRAM(S): at 18:30

## 2018-05-01 RX ADMIN — CLOPIDOGREL BISULFATE 75 MILLIGRAM(S): 75 TABLET, FILM COATED ORAL at 12:45

## 2018-05-01 NOTE — PROGRESS NOTE ADULT - SUBJECTIVE AND OBJECTIVE BOX
CHIEF COMPLAINT:    Interval Events:    REVIEW OF SYSTEMS:  Constitutional: No fevers or chills. No weight loss. No fatigue or generalized malaise.  Eyes: No itching or discharge from the eyes  ENT: No ear pain. No ear discharge. No nasal congestion. No post nasal drip. No epistaxis. No throat pain. No sore throat. No difficulty swallowing.   CV: No chest pain. No palpitations. No lightheadedness or dizziness.   Resp: No dyspnea at rest. No dyspnea on exertion. No orthopnea. No wheezing. No cough. No stridor. No sputum production. No chest pain with respiration.  GI: No nausea. No vomiting. No diarrhea.  MSK: No joint pain or pain in any extremities  Integumentary: No skin lesions. No pedal edema.  Neurological: No gross motor weakness. No sensory changes.  [ ] All other systems negative  [ ] Unable to assess ROS because ________    OBJECTIVE:  ICU Vital Signs Last 24 Hrs  T(C): 36.3 (01 May 2018 04:58), Max: 36.5 (30 Apr 2018 12:38)  T(F): 97.4 (01 May 2018 04:58), Max: 97.7 (30 Apr 2018 12:38)  HR: 68 (01 May 2018 04:58) (64 - 68)  BP: 117/62 (01 May 2018 04:58) (104/52 - 117/67)  BP(mean): --  ABP: --  ABP(mean): --  RR: 18 (01 May 2018 04:58) (18 - 18)  SpO2: 99% (01 May 2018 04:58) (96% - 99%)        04-29 @ 07:01 - 04-30 @ 07:00  --------------------------------------------------------  IN: 240 mL / OUT: 600 mL / NET: -360 mL    04-30 @ 07:01  -  05-01 @ 05:20  --------------------------------------------------------  IN: 660 mL / OUT: 700 mL / NET: -40 mL      CAPILLARY BLOOD GLUCOSE          PHYSICAL EXAM:  General: Awake, alert, oriented X 3.   HEENT: Atraumatic, normocephalic.                 Mallampatti Grade                 No nasal congestion.                No tonsillar or pharyngeal exudates.  Lymph Nodes: No palpable lymphadenopathy  Neck: No JVD. No carotid bruit.   Respiratory: Normal chest expansion                         Normal percussion                         Normal and equal air entry                         No wheeze, rhonchi or rales.  Cardiovascular: S1 S2 normal. No murmurs, rubs or gallops.   Abdomen: Soft, non-tender, non-distended. No organomegaly.  Extremities: Warm to touch. Peripheral pulse palpable. No pedal edema.   Skin: No rashes or skin lesions  Neurological: Motor and sensory examination equal and normal in all four extremities.  Psychiatry: Appropriate mood and affect.    HOSPITAL MEDICATIONS:  MEDICATIONS  (STANDING):  aspirin enteric coated 81 milliGRAM(s) Oral daily  BACItracin   Ointment 1 Application(s) Topical two times a day  buDESOnide  80 MICROgram(s)/formoterol 4.5 MICROgram(s) Inhaler 2 Puff(s) Inhalation two times a day  clopidogrel Tablet 75 milliGRAM(s) Oral daily  cyanocobalamin 500 MICROGram(s) Oral daily  finasteride 5 milliGRAM(s) Oral at bedtime  folic acid 1 milliGRAM(s) Oral daily  heparin  Injectable 5000 Unit(s) SubCutaneous every 12 hours  levothyroxine 25 MICROGram(s) Oral daily  midodrine 10 milliGRAM(s) Oral <User Schedule>  montelukast 10 milliGRAM(s) Oral daily  phenytoin   Capsule 200 milliGRAM(s) Oral two times a day  predniSONE   Tablet 10 milliGRAM(s) Oral daily  pyridoxine 100 milliGRAM(s) Oral daily  sodium chloride 0.65% Nasal 1 Spray(s) Both Nostrils four times a day  tiotropium 18 MICROgram(s) Capsule 1 Capsule(s) Inhalation daily    MEDICATIONS  (PRN):  acetaminophen   Tablet 650 milliGRAM(s) Oral every 6 hours PRN For Temp greater than 38 C (100.4 F)  ALBUTerol/ipratropium for Nebulization 3 milliLiter(s) Nebulizer every 6 hours PRN Shortness of Breath and/or Wheezing      LABS:                        9.4    8.1   )-----------( 192      ( 30 Apr 2018 13:58 )             26.9                     MICROBIOLOGY:     RADIOLOGY:  [ ] Reviewed and interpreted by me    Point of Care Ultrasound Findings:    PFT:    EKG: CHIEF COMPLAINT: fine--minimal walking--no cough--no hemoptysis; black stool    Interval Events: GI evaln    REVIEW OF SYSTEMS:  Constitutional: No fevers or chills. No weight loss. No fatigue or generalized malaise.  Eyes: No itching or discharge from the eyes  ENT: No ear pain. No ear discharge. No nasal congestion. No post nasal drip. No epistaxis. No throat pain. No sore throat. No difficulty swallowing.   CV: No chest pain. No palpitations. No lightheadedness or dizziness.   Resp: No dyspnea at rest. No dyspnea on exertion. No orthopnea. No wheezing. No cough. No stridor. No sputum production. No chest pain with respiration.  GI: No nausea. No vomiting. No diarrhea.  MSK: No joint pain or pain in any extremities  Integumentary: No skin lesions. No pedal edema.  Neurological: No gross motor weakness. No sensory changes.  [+ ] All other systems negative  [ ] Unable to assess ROS because ________    OBJECTIVE:  ICU Vital Signs Last 24 Hrs  T(C): 36.3 (01 May 2018 04:58), Max: 36.5 (30 Apr 2018 12:38)  T(F): 97.4 (01 May 2018 04:58), Max: 97.7 (30 Apr 2018 12:38)  HR: 68 (01 May 2018 04:58) (64 - 68)  BP: 117/62 (01 May 2018 04:58) (104/52 - 117/67)  BP(mean): --  ABP: --  ABP(mean): --  RR: 18 (01 May 2018 04:58) (18 - 18)  SpO2: 99% (01 May 2018 04:58) (96% - 99%)        04-29 @ 07:01  -  04-30 @ 07:00  --------------------------------------------------------  IN: 240 mL / OUT: 600 mL / NET: -360 mL    04-30 @ 07:01  -  05-01 @ 05:20  --------------------------------------------------------  IN: 660 mL / OUT: 700 mL / NET: -40 mL      CAPILLARY BLOOD GLUCOSE          PHYSICAL EXAM: NAD in bed  General: Awake, alert, oriented X 3.   HEENT: Atraumatic, normocephalic.                 Mallampatti Grade 3                 No nasal congestion.                No tonsillar or pharyngeal exudates.  Lymph Nodes: No palpable lymphadenopathy  Neck: No JVD. No carotid bruit.   Respiratory: Normal chest expansion                         Normal percussion                         Normal and equal air entry                         No wheeze, rhonchi or rales.  Cardiovascular: S1 S2 normal. + murmurs, rubs or gallops.   Abdomen: Soft, non-tender, non-distended. No organomegaly.  Extremities: Warm to touch. Peripheral pulse palpable. + dependent -pre sacral and pedal edema.   Skin: No rashes or skin lesions  Neurological: Motor and sensory examination equal and normal in all four extremities.  Psychiatry: Appropriate mood and affect.    HOSPITAL MEDICATIONS:  MEDICATIONS  (STANDING):  aspirin enteric coated 81 milliGRAM(s) Oral daily  BACItracin   Ointment 1 Application(s) Topical two times a day  buDESOnide  80 MICROgram(s)/formoterol 4.5 MICROgram(s) Inhaler 2 Puff(s) Inhalation two times a day  clopidogrel Tablet 75 milliGRAM(s) Oral daily  cyanocobalamin 500 MICROGram(s) Oral daily  finasteride 5 milliGRAM(s) Oral at bedtime  folic acid 1 milliGRAM(s) Oral daily  heparin  Injectable 5000 Unit(s) SubCutaneous every 12 hours  levothyroxine 25 MICROGram(s) Oral daily  midodrine 10 milliGRAM(s) Oral <User Schedule>  montelukast 10 milliGRAM(s) Oral daily  phenytoin   Capsule 200 milliGRAM(s) Oral two times a day  predniSONE   Tablet 10 milliGRAM(s) Oral daily  pyridoxine 100 milliGRAM(s) Oral daily  sodium chloride 0.65% Nasal 1 Spray(s) Both Nostrils four times a day  tiotropium 18 MICROgram(s) Capsule 1 Capsule(s) Inhalation daily    MEDICATIONS  (PRN):  acetaminophen   Tablet 650 milliGRAM(s) Oral every 6 hours PRN For Temp greater than 38 C (100.4 F)  ALBUTerol/ipratropium for Nebulization 3 milliLiter(s) Nebulizer every 6 hours PRN Shortness of Breath and/or Wheezing      LABS:                        9.4    8.1   )-----------( 192      ( 30 Apr 2018 13:58 )             26.9                     MICROBIOLOGY:     RADIOLOGY:  [ ] Reviewed and interpreted by me    Point of Care Ultrasound Findings:    PFT:    EKG:

## 2018-05-01 NOTE — DIETITIAN INITIAL EVALUATION ADULT. - ORAL INTAKE PTA
Pt reports good appetite and po intakes PTA. Wife prepares well balanced soft consistency diet at home. Pt states his appetite has been 'ravenous' 2/2 being on chronic steroids, wife verifies pt's good po intakes PTA/good

## 2018-05-01 NOTE — PROGRESS NOTE ADULT - ASSESSMENT
82M CAD with multiple stents (most recently 4/2018), asthma, former smoker, esophageal cancer with esophagectomy and gastric pullup, RA on chronic steroids who presents with hypotension in the setting of secondary adrenal insufficiency who now has had epistaxis with expectoration of blood (?hemoptysis vs hematemesis) and now with hypotension in the setting of decreased steroid dose    ENT evaluation-did not reveal any source\  CTS called 4/29 82M CAD with multiple stents (most recently 4/2018), asthma, former smoker, esophageal cancer with esophagectomy and gastric pullup, RA on chronic steroids who presents with hypotension in the setting of secondary adrenal insufficiency who now has had epistaxis with expectoration of blood (?hemoptysis vs hematemesis) and now with hypotension in the setting of decreased steroid dose    ENT evaluation-did not reveal any source\  CTS called 4/29--no intervention planned

## 2018-05-01 NOTE — PROGRESS NOTE ADULT - SUBJECTIVE AND OBJECTIVE BOX
had black  stools  yesterday, none today  was guaiac positive  seen by gi  REVIEW OF SYSTEMS:  CONSTITUTIONAL: No weakness,  no   fevers      RESPIRATORY:  No    shortness of breath  , no  wheezing  CARDIOVASCULAR: No chest pain,   no  palpitations, no  cough  GASTROINTESTINAL: No abdominal  pain, no  vomiting, no  diarrhea  NEUROLOGICAL: No  focal  weakness    MEDICATIONS  (STANDING):  aspirin enteric coated 81 milliGRAM(s) Oral daily  BACItracin   Ointment 1 Application(s) Topical two times a day  buDESOnide  80 MICROgram(s)/formoterol 4.5 MICROgram(s) Inhaler 2 Puff(s) Inhalation two times a day  clopidogrel Tablet 75 milliGRAM(s) Oral daily  cyanocobalamin 500 MICROGram(s) Oral daily  finasteride 5 milliGRAM(s) Oral at bedtime  folic acid 1 milliGRAM(s) Oral daily  heparin  Injectable 5000 Unit(s) SubCutaneous every 12 hours  levothyroxine 25 MICROGram(s) Oral daily  midodrine 10 milliGRAM(s) Oral <User Schedule>  montelukast 10 milliGRAM(s) Oral daily  phenytoin   Capsule 200 milliGRAM(s) Oral two times a day  predniSONE   Tablet 10 milliGRAM(s) Oral daily  pyridoxine 100 milliGRAM(s) Oral daily  sodium chloride 0.65% Nasal 1 Spray(s) Both Nostrils four times a day  tiotropium 18 MICROgram(s) Capsule 1 Capsule(s) Inhalation daily    MEDICATIONS  (PRN):  acetaminophen   Tablet 650 milliGRAM(s) Oral every 6 hours PRN For Temp greater than 38 C (100.4 F)  ALBUTerol/ipratropium for Nebulization 3 milliLiter(s) Nebulizer every 6 hours PRN Shortness of Breath and/or Wheezing      Vital Signs Last 24 Hrs  T(C): 36.3 (01 May 2018 04:58), Max: 36.5 (30 Apr 2018 12:38)  T(F): 97.4 (01 May 2018 04:58), Max: 97.7 (30 Apr 2018 12:38)  HR: 68 (01 May 2018 04:58) (64 - 68)  BP: 117/62 (01 May 2018 04:58) (104/52 - 117/67)  BP(mean): --  RR: 18 (01 May 2018 04:58) (18 - 18)  SpO2: 99% (01 May 2018 04:58) (96% - 99%)  CAPILLARY BLOOD GLUCOSE        I&O's Summary    30 Apr 2018 07:01  -  01 May 2018 07:00  --------------------------------------------------------  IN: 660 mL / OUT: 700 mL / NET: -40 mL        PHYSICAL EXAM:  HEAD:  Atraumatic, Normocephalic  NECK: Supple, No   JVD  CHEST/LUNG:   no     rales,     no,    rhonchi  HEART: Regular rate and rhythm;         murmur  ABDOMEN: Soft, Nontender, ;   EXTREMITIES:        edema  NEUROLOGY:  alert    LABS:                        8.4    4.41  )-----------( 160      ( 01 May 2018 07:51 )             25.8     05-01    142  |  106  |  22  ----------------------------<  69<L>  3.6   |  28  |  0.59    Ca    8.9      01 May 2018 05:57                      Thyroid Stimulating Hormone, Serum: 10.92 uIU/mL (04-29 @ 08:03)          Consultant(s) Notes Reviewed:      Care Discussed with Consultants/Other Providers:

## 2018-05-01 NOTE — DIETITIAN INITIAL EVALUATION ADULT. - NS AS NUTRI INTERV MEALS SNACK
Food preferences obtained and will be honored - pt requesting 2 oatmeal for breakfast, ice creams for lunch and dinner. Pt also requesting salt packets as he was advised by MD to have enough salt for adrenal insufficiency.

## 2018-05-01 NOTE — PROGRESS NOTE ADULT - ASSESSMENT
83 year old man with hx esophageal cancer s/p maryjane logan esophagogastrectomy (4/2017) complicated by anastamotic leak and stricture s/p dilation, CAD s/p multiple stents on ASA/plavix, with hemoptysis and anemia requiring blood transfusions, stabilized. No acute findings on exam at this time. AVSS.

## 2018-05-01 NOTE — PROGRESS NOTE ADULT - ATTENDING COMMENTS
as above-  hemoptysis not localized to ENT source: CTS evaluation Dr. Christian Weinstein to consider FOB (no hemoptysis O/N)  Asthma/Chronic bronchitis rx as outlined above-acapella/incentive spirometry                Aspiration precautions  Cards/endo follow up-for BP  PT needed    William Juarez MD-Pulmonary   [phone] as above-  hemoptysis not localized to ENT source: CTS evaluation Dr. Christian Weinstein ? FOB (no hemoptysis O/N)--no intervention planned  Asthma/Chronic bronchitis rx as outlined above-acapella/incentive spirometry                Aspiration precautions  Cards/endo follow up-for BP (no adrenal insufficiency but felt hypothyroid)   Fluid OL--diurese as able here.  PT needed      GI follow up-H/H stable    William Juarez MD-Pulmonary   [phone]

## 2018-05-01 NOTE — PROGRESS NOTE ADULT - SUBJECTIVE AND OBJECTIVE BOX
INTERVAL HPI/OVERNIGHT EVENTS: no further black stools, Hb stable, guaiac positive stool noted    MEDICATIONS  (STANDING):  aspirin enteric coated 81 milliGRAM(s) Oral daily  BACItracin   Ointment 1 Application(s) Topical two times a day  buDESOnide  80 MICROgram(s)/formoterol 4.5 MICROgram(s) Inhaler 2 Puff(s) Inhalation two times a day  clopidogrel Tablet 75 milliGRAM(s) Oral daily  cyanocobalamin 500 MICROGram(s) Oral daily  finasteride 5 milliGRAM(s) Oral at bedtime  folic acid 1 milliGRAM(s) Oral daily  heparin  Injectable 5000 Unit(s) SubCutaneous every 12 hours  levothyroxine 25 MICROGram(s) Oral daily  midodrine 10 milliGRAM(s) Oral <User Schedule>  montelukast 10 milliGRAM(s) Oral daily  phenytoin   Capsule 200 milliGRAM(s) Oral two times a day  predniSONE   Tablet 10 milliGRAM(s) Oral daily  pyridoxine 100 milliGRAM(s) Oral daily  sodium chloride 0.65% Nasal 1 Spray(s) Both Nostrils four times a day  tiotropium 18 MICROgram(s) Capsule 1 Capsule(s) Inhalation daily    MEDICATIONS  (PRN):  acetaminophen   Tablet 650 milliGRAM(s) Oral every 6 hours PRN For Temp greater than 38 C (100.4 F)  ALBUTerol/ipratropium for Nebulization 3 milliLiter(s) Nebulizer every 6 hours PRN Shortness of Breath and/or Wheezing      Allergies    penicillin (Rash)    Intolerances            PHYSICAL EXAM:   Vital Signs:  Vital Signs Last 24 Hrs  T(C): 36.3 (01 May 2018 04:58), Max: 36.5 (2018 12:38)  T(F): 97.4 (01 May 2018 04:58), Max: 97.7 (2018 12:38)  HR: 68 (01 May 2018 04:58) (64 - 68)  BP: 117/62 (01 May 2018 04:58) (104/52 - 117/67)  BP(mean): --  RR: 18 (01 May 2018 04:58) (18 - 18)  SpO2: 99% (01 May 2018 04:58) (96% - 99%)  Daily     Daily Weight in k.4 (01 May 2018 04:58)    GENERAL:  no distress  HEENT:  NC/AT,  anicteric  CHEST:   no increased effort, breath sounds clear  HEART:  Regular rhythm  ABDOMEN:  Soft, non-tender, non-distended, normoactive bowel sounds,  no masses ,no hepato-splenomegaly, no signs of chronic liver disease  EXTEREMITIES:  no cyanosis      LABS:                        9.4    8.1   )-----------( 192      ( 2018 13:58 )             26.9     05-    142  |  106  |  22  ----------------------------<  69<L>  3.6   |  28  |  0.59    Ca    8.9      01 May 2018 05:57            RADIOLOGY & ADDITIONAL TESTS:

## 2018-05-01 NOTE — DIETITIAN INITIAL EVALUATION ADULT. - OTHER INFO
Pt seen for LOS, reports UBW used to be 160 pounds prior to cancer diagnosis. Pt reports about 45 pounds wt loss during cancer treatment and esophageal surgery, had wt loss down to 123 pounds (10/2017). Per previous RD notes, pt's wt fluctuations noted from 140.6 pounds to 160 pounds (09/2017). Pt attributes the wt loss from 'being unable to eat for 50 days and tube feeding'. Pt's wt has been slowly increasing since 10/2017, current wt Is 137.5 pounds. Pt reports excellent appetite, % po intakes of meals per flowsheet. No GI distress, +BM yesterday. Pt wears dentures with good fit, s/p SLP swallow evaluation (4/24) recommended regular consistency diet. NKFA. PTA takes vitamin B12, B6, and folic acid supplements.

## 2018-05-01 NOTE — PROGRESS NOTE ADULT - SUBJECTIVE AND OBJECTIVE BOX
- Patient seen and examined.  - In summary, patient is a 82 year old man who presented with weakness and fatigue. (2018 11:11)         *****MEDICATIONS:    MEDICATIONS  (STANDING):  aspirin enteric coated 81 milliGRAM(s) Oral daily  BACItracin   Ointment 1 Application(s) Topical two times a day  buDESOnide  80 MICROgram(s)/formoterol 4.5 MICROgram(s) Inhaler 2 Puff(s) Inhalation two times a day  clopidogrel Tablet 75 milliGRAM(s) Oral daily  cyanocobalamin 500 MICROGram(s) Oral daily  finasteride 5 milliGRAM(s) Oral at bedtime  folic acid 1 milliGRAM(s) Oral daily  heparin  Injectable 5000 Unit(s) SubCutaneous every 12 hours  levothyroxine 25 MICROGram(s) Oral daily  midodrine 10 milliGRAM(s) Oral <User Schedule>  montelukast 10 milliGRAM(s) Oral daily  phenytoin   Capsule 200 milliGRAM(s) Oral two times a day  predniSONE   Tablet 10 milliGRAM(s) Oral daily  pyridoxine 100 milliGRAM(s) Oral daily  sodium chloride 0.65% Nasal 1 Spray(s) Both Nostrils four times a day  tiotropium 18 MICROgram(s) Capsule 1 Capsule(s) Inhalation daily    MEDICATIONS  (PRN):  acetaminophen   Tablet 650 milliGRAM(s) Oral every 6 hours PRN For Temp greater than 38 C (100.4 F)  ALBUTerol/ipratropium for Nebulization 3 milliLiter(s) Nebulizer every 6 hours PRN Shortness of Breath and/or Wheezing             ***** REVIEW OF SYSTEM:  GEN: no fever, no chills, no pain  RESP: no SOB, no cough, no sputum  CVS: no chest pain, no palpitations, no edema  GI: no abdominal pain, no nausea, no vomiting, no constipation, no diarrhea  : no dysuria, no frequency  NEURO: no headache, no dizziness  PSYCH: no depression, not anxious  Derm : no itching, no rash         ***** VITAL SIGNS:    T(F): 97.4 (18 @ 04:58), Max: 97.7 (18 @ 12:38)  HR: 68 (18 @ 04:58) (64 - 68)  BP: 117/62 (18 @ 04:58) (104/52 - 117/67)  RR: 18 (18 @ 04:58) (18 - 18)  SpO2: 99% (18 @ 04:58) (96% - 99%)  Wt(kg): --  ,   I&O's Summary    2018 07:  -  01 May 2018 07:00  --------------------------------------------------------  IN: 660 mL / OUT: 700 mL / NET: -40 mL             *****PHYSICAL EXAM:  GEN: A&O X 3 , NAD , comfortable  HEENT: NCAT, EOMI, MMM, no icterus  NECK: Supple, No JVD  CVS: S1S2 , regular , No M/R/G appreciated  PULM: CTA B/L,  no W/R/R appreciated  ABD.: soft. non tender, non distended,  bowel sounds present  Extrem: intact pulses , no edema noted  Derm: No rash or ecchymosis noted  PSYCH: normal mood, no depression, not anxious         *****LAB AND IMAGIN.4    4.41  )-----------( 160      ( 01 May 2018 07:51 )             25.8                   142  |  106  |  22  ----------------------------<  69<L>  3.6   |  28  |  0.59    Ca    8.9      01 May 2018 05:57      [All pertinent recent Imaging/Reports reviewed]  < from: Transthoracic Echocardiogram (18 @ 14:23) >  Conclusions:  1. Mitral annular calcification, otherwise normal mitral  valve. Mild mitral regurgitation. Mean transmitral valve  gradient equals 4 mm Hg, consistent with mild mitral  stenosis. (HRabout 60s bpm)  2. Calcified trileaflet aortic valve with decreased  opening. Peak transaortic valve gradient equals 34 mm Hg,  mean transaortic valve gradient equals 19 mm Hg, estimated  aortic valve area equals 1.3 sqcm (by continuity equation),  aortic valve velocity time integral equals 59 cm,  consistent with moderate aortic stenosis. Aortic valve  assessment is limited by the presence of frequent PVCs and  the degree of aortic stenosis may be underestimated.  Visually, the aortic valve appears moderate-severely  stenotic. Consider additional imaging of the aortic valve  such as with additional TTE or TINO imaging if clinically  indicated.No aortic valve regurgitation seen.  3. Severely dilated left atrium.  LA volume index = 64  cc/m2.  4. Endocardium not well visualized; grossly normal left  ventricular systolic function. Septal motion consistent  with conduction defect. There is a false tendon in the LV  cavity (normal variant).  5. The right ventricle is not well visualized; right  ventricle appears enlarged with normal systolic function.  *** Compared with echocardiogram of 2017, results are  similar on today's study. Aortic valve gradients are higher  on this study.    < end of copied text >         *****A S S E S S M E N T   A N D   P L A N :  82M with HTN, Asthma, CVA, seizures, CAD who presents with mild chest discomfort, fatigue, diaphoresis, hypothermia, hypoglycemia, hypotension  off abx per ID  keep O=I  DAPT s/p recent PCI  BB if/when BP allows  echo noted  GI, pulm f/u noted  DCP    __________________________  POLLO Brady D.O.

## 2018-05-01 NOTE — DIETITIAN INITIAL EVALUATION ADULT. - ENERGY NEEDS
Height: 63 inches, Weight: 137.5 pounds  BMI: 24 kg/m2 IBW: 124 pounds (+/-10%), %IBW: 110%  Pertinent Info: Per chart, 83 y/o male with PMH of HTN, TIA, seizures, RA, CAD s/p 6 stents, esophageal cancer s/p chemo/RT/esophagogastrectomy admitted with AMS and hypotension 2/2 adrenal insufficiency. +1 bilateral legs edema, no pressure ulcers noted at this time.

## 2018-05-01 NOTE — PROGRESS NOTE ADULT - SUBJECTIVE AND OBJECTIVE BOX
I am ok       Daily     Daily Weight in k.4 (01 May 2018 04:58)  Admit Wt: Drug Dosing Weight  Height (cm): 160.02 (2018 18:20)  Weight (kg): 56 (2018 04:34)  BMI (kg/m2): 21.9 (2018 04:34)  BSA (m2): 1.58 (2018 04:34)      CAPILLARY BLOOD GLUCOSE              MEDICATIONS  acetaminophen   Tablet 650 milliGRAM(s) Oral every 6 hours PRN  ALBUTerol/ipratropium for Nebulization 3 milliLiter(s) Nebulizer every 6 hours PRN  aspirin enteric coated 81 milliGRAM(s) Oral daily  BACItracin   Ointment 1 Application(s) Topical two times a day  buDESOnide  80 MICROgram(s)/formoterol 4.5 MICROgram(s) Inhaler 2 Puff(s) Inhalation two times a day  clopidogrel Tablet 75 milliGRAM(s) Oral daily  cyanocobalamin 500 MICROGram(s) Oral daily  finasteride 5 milliGRAM(s) Oral at bedtime  folic acid 1 milliGRAM(s) Oral daily  heparin  Injectable 5000 Unit(s) SubCutaneous every 12 hours  levothyroxine 25 MICROGram(s) Oral daily  midodrine 10 milliGRAM(s) Oral <User Schedule>  montelukast 10 milliGRAM(s) Oral daily  phenytoin   Capsule 200 milliGRAM(s) Oral two times a day  predniSONE   Tablet 10 milliGRAM(s) Oral daily  pyridoxine 100 milliGRAM(s) Oral daily  sodium chloride 0.65% Nasal 1 Spray(s) Both Nostrils four times a day  tiotropium 18 MICROgram(s) Capsule 1 Capsule(s) Inhalation daily      PHYSICAL EXAM  Subjective: Pt denies any hemoptysis, epistaxis, cough or sob.  General: Awake and alert, sitting on chair, WN, WD, NAD  Neurology: alert and oriented x 3, nonfocal, no gross deficits  CV : s1, s2  Lungs: CTA B/L  Abdomen: soft, NT,ND, (+) Bowel sounds  :  voiding  Extremities: -c/c/e

## 2018-05-02 ENCOUNTER — APPOINTMENT (OUTPATIENT)
Dept: PULMONOLOGY | Facility: CLINIC | Age: 83
End: 2018-05-02

## 2018-05-02 LAB
BLD GP AB SCN SERPL QL: NEGATIVE — SIGNIFICANT CHANGE UP
CORTICOSTEROID BINDING GLOBULIN RESULT: 2.2 MG/DL — SIGNIFICANT CHANGE UP
CORTIS F/TOTAL MFR SERPL: 2.9 % — SIGNIFICANT CHANGE UP
CORTIS SERPL-MCNC: 3.1 UG/DL — SIGNIFICANT CHANGE UP
CORTISOL, FREE RESULT: 0.09 UG/DL — SIGNIFICANT CHANGE UP
HCT VFR BLD CALC: 25.3 % — LOW (ref 39–50)
HCT VFR BLD CALC: 30.4 % — LOW (ref 39–50)
HGB BLD-MCNC: 10.5 G/DL — LOW (ref 13–17)
HGB BLD-MCNC: 8.2 G/DL — LOW (ref 13–17)
MCHC RBC-ENTMCNC: 31.4 PG — SIGNIFICANT CHANGE UP (ref 27–34)
MCHC RBC-ENTMCNC: 32.4 GM/DL — SIGNIFICANT CHANGE UP (ref 32–36)
MCHC RBC-ENTMCNC: 33.1 PG — SIGNIFICANT CHANGE UP (ref 27–34)
MCHC RBC-ENTMCNC: 34.4 GM/DL — SIGNIFICANT CHANGE UP (ref 32–36)
MCV RBC AUTO: 96.1 FL — SIGNIFICANT CHANGE UP (ref 80–100)
MCV RBC AUTO: 96.9 FL — SIGNIFICANT CHANGE UP (ref 80–100)
PLATELET # BLD AUTO: 169 K/UL — SIGNIFICANT CHANGE UP (ref 150–400)
PLATELET # BLD AUTO: 171 K/UL — SIGNIFICANT CHANGE UP (ref 150–400)
RBC # BLD: 2.61 M/UL — LOW (ref 4.2–5.8)
RBC # BLD: 3.17 M/UL — LOW (ref 4.2–5.8)
RBC # FLD: 16.8 % — HIGH (ref 10.3–14.5)
RBC # FLD: 18.9 % — HIGH (ref 10.3–14.5)
RH IG SCN BLD-IMP: POSITIVE — SIGNIFICANT CHANGE UP
WBC # BLD: 4.71 K/UL — SIGNIFICANT CHANGE UP (ref 3.8–10.5)
WBC # BLD: 6.6 K/UL — SIGNIFICANT CHANGE UP (ref 3.8–10.5)
WBC # FLD AUTO: 4.71 K/UL — SIGNIFICANT CHANGE UP (ref 3.8–10.5)
WBC # FLD AUTO: 6.6 K/UL — SIGNIFICANT CHANGE UP (ref 3.8–10.5)

## 2018-05-02 PROCEDURE — 99232 SBSQ HOSP IP/OBS MODERATE 35: CPT

## 2018-05-02 RX ORDER — HYDROXYZINE HCL 10 MG
10 TABLET ORAL ONCE
Qty: 0 | Refills: 0 | Status: COMPLETED | OUTPATIENT
Start: 2018-05-02 | End: 2018-05-02

## 2018-05-02 RX ADMIN — FINASTERIDE 5 MILLIGRAM(S): 5 TABLET, FILM COATED ORAL at 21:58

## 2018-05-02 RX ADMIN — HEPARIN SODIUM 5000 UNIT(S): 5000 INJECTION INTRAVENOUS; SUBCUTANEOUS at 05:10

## 2018-05-02 RX ADMIN — MONTELUKAST 10 MILLIGRAM(S): 4 TABLET, CHEWABLE ORAL at 11:19

## 2018-05-02 RX ADMIN — MIDODRINE HYDROCHLORIDE 10 MILLIGRAM(S): 2.5 TABLET ORAL at 06:03

## 2018-05-02 RX ADMIN — TIOTROPIUM BROMIDE 1 CAPSULE(S): 18 CAPSULE ORAL; RESPIRATORY (INHALATION) at 11:20

## 2018-05-02 RX ADMIN — HEPARIN SODIUM 5000 UNIT(S): 5000 INJECTION INTRAVENOUS; SUBCUTANEOUS at 18:58

## 2018-05-02 RX ADMIN — Medication 200 MILLIGRAM(S): at 05:10

## 2018-05-02 RX ADMIN — Medication 1 MILLIGRAM(S): at 11:19

## 2018-05-02 RX ADMIN — Medication 100 MILLIGRAM(S): at 11:19

## 2018-05-02 RX ADMIN — CLOPIDOGREL BISULFATE 75 MILLIGRAM(S): 75 TABLET, FILM COATED ORAL at 11:19

## 2018-05-02 RX ADMIN — Medication 200 MILLIGRAM(S): at 23:19

## 2018-05-02 RX ADMIN — Medication 1 SPRAY(S): at 23:20

## 2018-05-02 RX ADMIN — Medication 10 MILLIGRAM(S): at 05:09

## 2018-05-02 RX ADMIN — BUDESONIDE AND FORMOTEROL FUMARATE DIHYDRATE 2 PUFF(S): 160; 4.5 AEROSOL RESPIRATORY (INHALATION) at 18:58

## 2018-05-02 RX ADMIN — Medication 81 MILLIGRAM(S): at 11:20

## 2018-05-02 RX ADMIN — Medication 200 MILLIGRAM(S): at 18:58

## 2018-05-02 RX ADMIN — MIDODRINE HYDROCHLORIDE 10 MILLIGRAM(S): 2.5 TABLET ORAL at 11:19

## 2018-05-02 RX ADMIN — PREGABALIN 500 MICROGRAM(S): 225 CAPSULE ORAL at 11:20

## 2018-05-02 RX ADMIN — Medication 1 SPRAY(S): at 11:23

## 2018-05-02 RX ADMIN — Medication 1 APPLICATION(S): at 05:10

## 2018-05-02 RX ADMIN — Medication 10 MILLIGRAM(S): at 00:07

## 2018-05-02 RX ADMIN — Medication 1 SPRAY(S): at 18:59

## 2018-05-02 RX ADMIN — MIDODRINE HYDROCHLORIDE 10 MILLIGRAM(S): 2.5 TABLET ORAL at 18:58

## 2018-05-02 RX ADMIN — Medication 1 APPLICATION(S): at 18:57

## 2018-05-02 RX ADMIN — BUDESONIDE AND FORMOTEROL FUMARATE DIHYDRATE 2 PUFF(S): 160; 4.5 AEROSOL RESPIRATORY (INHALATION) at 05:10

## 2018-05-02 RX ADMIN — Medication 25 MICROGRAM(S): at 05:09

## 2018-05-02 RX ADMIN — Medication 1 SPRAY(S): at 05:11

## 2018-05-02 NOTE — PROGRESS NOTE ADULT - SUBJECTIVE AND OBJECTIVE BOX
MEJIA LATIF:[id number],   82yMale followed for:  penicillin (Rash)    PAST MEDICAL & SURGICAL HISTORY:  Coronary artery disease: s/p 3 stents on June 30, 2017 at [place] Dr. Lavelle Reid  OA (osteoarthritis) of knee: right  Former smoker, stopped smoking in distant past  Rheumatoid arthritis  Seizure disorder: 1 episode approximately 15 yrs ago  Asthma  Hyperlipidemia  BPH (benign prostatic hyperplasia)  HTN (hypertension)  Esophagus cancer  Chronic allergic rhinitis  BCC (basal cell carcinoma): skin  Cerebrovascular accident (CVA)  Anxiety  Anastomotic leak following esophagectomy: [place] logan esophagectomy  History of tonsillectomy  H/O heart artery stent: June 30, 2017  Knee arthropathy: left  History of total hip replacement: left  History of hernia repair    FAMILY HISTORY:  Family history of heart attack  Family history of pulmonary embolism: father @ 49 yr old  FH: CAD (coronary artery disease) (Sibling)    MEDICATIONS  (STANDING):  aspirin enteric coated 81 milliGRAM(s) Oral daily  BACItracin   Ointment 1 Application(s) Topical two times a day  buDESOnide  80 MICROgram(s)/formoterol 4.5 MICROgram(s) Inhaler 2 Puff(s) Inhalation two times a day  clopidogrel Tablet 75 milliGRAM(s) Oral daily  cyanocobalamin 500 MICROGram(s) Oral daily  finasteride 5 milliGRAM(s) Oral at bedtime  folic acid 1 milliGRAM(s) Oral daily  heparin  Injectable 5000 Unit(s) SubCutaneous every 12 hours  levothyroxine 25 MICROGram(s) Oral daily  midodrine 10 milliGRAM(s) Oral <User Schedule>  montelukast 10 milliGRAM(s) Oral daily  phenytoin   Capsule 200 milliGRAM(s) Oral two times a day  predniSONE   Tablet 10 milliGRAM(s) Oral daily  pyridoxine 100 milliGRAM(s) Oral daily  sodium chloride 0.65% Nasal 1 Spray(s) Both Nostrils four times a day  tiotropium 18 MICROgram(s) Capsule 1 Capsule(s) Inhalation daily    MEDICATIONS  (PRN):  acetaminophen   Tablet 650 milliGRAM(s) Oral every 6 hours PRN For Temp greater than 38 C (100.4 F)  ALBUTerol/ipratropium for Nebulization 3 milliLiter(s) Nebulizer every 6 hours PRN Shortness of Breath and/or Wheezing      Vital Signs Last 24 Hrs  T(C): 36.5 (02 May 2018 04:54), Max: 36.6 (01 May 2018 21:08)  T(F): 97.7 (02 May 2018 04:54), Max: 97.9 (01 May 2018 21:08)  HR: 65 (02 May 2018 04:54) (64 - 81)  BP: 126/67 (02 May 2018 04:54) (93/56 - 126/67)  BP(mean): --  RR: 18 (02 May 2018 04:54) (18 - 18)  SpO2: 95% (02 May 2018 04:54) (93% - 100%)  nc/at  s1s2  cta  soft, nt, nd no guarding or rebound  no c/c/e    CBC Full  -  ( 01 May 2018 07:51 )  WBC Count : 4.41 K/uL  Hemoglobin : 8.4 g/dL  Hematocrit : 25.8 %  Platelet Count - Automated : 160 K/uL  Mean Cell Volume : 95.2 fl  Mean Cell Hemoglobin : 31.0 pg  Mean Cell Hemoglobin Concentration : 32.6 gm/dL  Auto Neutrophil # : x  Auto Lymphocyte # : x  Auto Monocyte # : x  Auto Eosinophil # : x  Auto Basophil # : x  Auto Neutrophil % : x  Auto Lymphocyte % : x  Auto Monocyte % : x  Auto Eosinophil % : x  Auto Basophil % : x    05-01    142  |  106  |  22  ----------------------------<  69<L>  3.6   |  28  |  0.59    Ca    8.9      01 May 2018 05:57

## 2018-05-02 NOTE — PROGRESS NOTE ADULT - ASSESSMENT
82 y/o Esophageal Ca. CAD recent admission with stent placement admitted with fatigue, hypotension, hypoglycemia, and hypothermia. Patient on chronic steroids for RA.    Patient says that in the past year he lost 45 lbs. following a diagnosis of esophageal Ca, adjuvant chemo/ RT and surgery 8/17. He had multiple stents placed during this time, recently 4/17. After discharge he was compliant with prednisone 5 mg daily, no fever, no chills, but noted worsening fatigue at night. He was admitted with complaints of chest pain, however on admission he was noted with hypothermia, hypotension and hypoglycemia. All resolved with stress dose steroids. He was started on epimeric antibiotics, but no clear source of infection noted.   Symptoms on admission resolved with steroids, except low BP- patient is off his BP meds, started on midodrine.  Still on high dose hydrocortisone.    TFT's abnormality mostly due to treatment with dilantin and recent iodine dye- no evidence for pituitary disease.  Low dose synthroid started for persistant TSH elevation despite days elapsed from iodine load.    Patient transfused again for decreased H/H.  On full dose midodrine, off florinef- H/O CHF, increased salt diet.  Compressive stockings added, able to ambulate on floor last few days.

## 2018-05-02 NOTE — PROGRESS NOTE ADULT - ASSESSMENT
pt  with  h/o htn, cad, stents, br  asthma, seizure, cva , ca esophagus,  c/c  cachexia, RA  on prednisone   s./p  recent cath,   with rico, to  LAD and angioplasty, D1, ON 4/17,  an d since then, had  low  bp,, and  was  on midodrine, on last admission  h/o  anemia ,   on c/c . prednisone,, for  RA,    low  bp,  stress dose  hydrocortisone,   does  not  appear to have  sepsis,    ID called  c/.c anemia  Pt  is dnr now,    echo  with modertae AS. / ?  severe   asa/ plavix   given recent pci  ent eval and endo  dr lida cruz, for  low  bp,  and  on  c/c  prednisone at home, noted  per  endo, pt  does  not have adrenal insufficiency   no artificial  feeding per  family.  and pt wants  regular diet  now  with guaiac positive stools, seen by gi,  awaiting hb today, no  gi  w/p per  dr rutledge

## 2018-05-02 NOTE — PROGRESS NOTE ADULT - SUBJECTIVE AND OBJECTIVE BOX
- Patient seen and examined.  - In summary, patient is a 82 year old man who presented with weakness and fatigue. (2018 11:11)         *****MEDICATIONS:    MEDICATIONS  (STANDING):  aspirin enteric coated 81 milliGRAM(s) Oral daily  BACItracin   Ointment 1 Application(s) Topical two times a day  buDESOnide  80 MICROgram(s)/formoterol 4.5 MICROgram(s) Inhaler 2 Puff(s) Inhalation two times a day  clopidogrel Tablet 75 milliGRAM(s) Oral daily  cyanocobalamin 500 MICROGram(s) Oral daily  finasteride 5 milliGRAM(s) Oral at bedtime  folic acid 1 milliGRAM(s) Oral daily  heparin  Injectable 5000 Unit(s) SubCutaneous every 12 hours  levothyroxine 25 MICROGram(s) Oral daily  midodrine 10 milliGRAM(s) Oral <User Schedule>  montelukast 10 milliGRAM(s) Oral daily  phenytoin   Capsule 200 milliGRAM(s) Oral two times a day  predniSONE   Tablet 10 milliGRAM(s) Oral daily  pyridoxine 100 milliGRAM(s) Oral daily  sodium chloride 0.65% Nasal 1 Spray(s) Both Nostrils four times a day  tiotropium 18 MICROgram(s) Capsule 1 Capsule(s) Inhalation daily    MEDICATIONS  (PRN):  acetaminophen   Tablet 650 milliGRAM(s) Oral every 6 hours PRN For Temp greater than 38 C (100.4 F)  ALBUTerol/ipratropium for Nebulization 3 milliLiter(s) Nebulizer every 6 hours PRN Shortness of Breath and/or Wheezing               ***** REVIEW OF SYSTEM:  GEN: no fever, no chills, no pain  RESP: no SOB, no cough, no sputum  CVS: no chest pain, no palpitations, no edema  GI: no abdominal pain, no nausea, no vomiting, no constipation, no diarrhea  : no dysuria, no frequency  NEURO: no headache, no dizziness  PSYCH: no depression, not anxious  Derm : no itching, no rash         ***** VITAL SIGNS:    T(F): 97.7 (18 @ 04:54), Max: 97.9 (18 @ 21:08)  HR: 65 (18 @ 04:54) (64 - 81)  BP: 126/67 (18 @ 04:54) (93/56 - 126/67)  RR: 18 (18 @ 04:54) (18 - 18)  SpO2: 95% (18 @ 04:54) (93% - 100%)  Wt(kg): --  ,   I&O's Summary    01 May 2018 07:01  -  02 May 2018 07:00  --------------------------------------------------------  IN: 420 mL / OUT: 450 mL / NET: -30 mL                   *****PHYSICAL EXAM:  GEN: A&O X 3 , NAD , comfortable  HEENT: NCAT, EOMI, MMM, no icterus  NECK: Supple, No JVD  CVS: S1S2 , regular , No M/R/G appreciated  PULM: CTA B/L,  no W/R/R appreciated  ABD.: soft. non tender, non distended,  bowel sounds present  Extrem: intact pulses , no edema noted  Derm: No rash or ecchymosis noted  PSYCH: normal mood, no depression, not anxious         *****LAB AND IMAGIN.2    4.71  )-----------( 169      ( 02 May 2018 07:38 )             25.3               05-    142  |  106  |  22  ----------------------------<  69<L>  3.6   |  28  |  0.59    Ca    8.9      01 May 2018 05:57        [All pertinent recent Imaging/Reports reviewed]  < from: Transthoracic Echocardiogram (18 @ 14:23) >  Conclusions:  1. Mitral annular calcification, otherwise normal mitral  valve. Mild mitral regurgitation. Mean transmitral valve  gradient equals 4 mm Hg, consistent with mild mitral  stenosis. (HRabout 60s bpm)  2. Calcified trileaflet aortic valve with decreased  opening. Peak transaortic valve gradient equals 34 mm Hg,  mean transaortic valve gradient equals 19 mm Hg, estimated  aortic valve area equals 1.3 sqcm (by continuity equation),  aortic valve velocity time integral equals 59 cm,  consistent with moderate aortic stenosis. Aortic valve  assessment is limited by the presence of frequent PVCs and  the degree of aortic stenosis may be underestimated.  Visually, the aortic valve appears moderate-severely  stenotic. Consider additional imaging of the aortic valve  such as with additional TTE or TINO imaging if clinically  indicated.No aortic valve regurgitation seen.  3. Severely dilated left atrium.  LA volume index = 64  cc/m2.  4. Endocardium not well visualized; grossly normal left  ventricular systolic function. Septal motion consistent  with conduction defect. There is a false tendon in the LV  cavity (normal variant).  5. The right ventricle is not well visualized; right  ventricle appears enlarged with normal systolic function.  *** Compared with echocardiogram of 2017, results are  similar on today's study. Aortic valve gradients are higher  on this study.    < end of copied text >         *****A S S E S S M E N T   A N D   P L A N :  82M with HTN, Asthma, CVA, seizures, CAD who presents with mild chest discomfort, fatigue, diaphoresis, hypothermia, hypoglycemia, hypotension  off abx per ID  keep O=I  DAPT s/p recent PCI  BB if/when BP allows  echo noted  GI, pulm f/u noted  f/u H/H  DCP    __________________________  POLLO Brady D.O.

## 2018-05-02 NOTE — PROGRESS NOTE ADULT - ASSESSMENT
82M CAD with multiple stents (most recently 4/2018), asthma, former smoker, esophageal cancer with esophagectomy and gastric pullup, RA on chronic steroids who presents with hypotension in the setting of secondary adrenal insufficiency who now has had epistaxis with expectoration of blood (?hemoptysis vs hematemesis) and now with hypotension in the setting of decreased steroid dose    ENT evaluation-did not reveal any source  CTS called 4/29--no intervention planned    GI -[place] following-"no intervention planned here" 5/2

## 2018-05-02 NOTE — PROGRESS NOTE ADULT - SUBJECTIVE AND OBJECTIVE BOX
Chief Complaint: 84 y/o Esophageal Ca. CAD recent admission with stent placement admitted with fatigue, hypotension, hypoglycemia, and hypothermia. Patient on chronic steroids for RA.    Patient transfused again (2nd unit) for decreased H/H.  Compressive stockings.  PO intake better, still small amounts.  Salt diet liberalized Off Florinef / midodrine  full dose, walked on floor today.      MEDICATIONS  (STANDING):  aspirin enteric coated 81 milliGRAM(s) Oral daily  BACItracin   Ointment 1 Application(s) Topical two times a day  buDESOnide  80 MICROgram(s)/formoterol 4.5 MICROgram(s) Inhaler 2 Puff(s) Inhalation two times a day  clopidogrel Tablet 75 milliGRAM(s) Oral daily  cyanocobalamin 500 MICROGram(s) Oral daily  finasteride 5 milliGRAM(s) Oral at bedtime  folic acid 1 milliGRAM(s) Oral daily  heparin  Injectable 5000 Unit(s) SubCutaneous every 12 hours  levothyroxine 25 MICROGram(s) Oral daily  midodrine 10 milliGRAM(s) Oral <User Schedule>  montelukast 10 milliGRAM(s) Oral daily  phenytoin   Capsule 200 milliGRAM(s) Oral two times a day  predniSONE   Tablet 10 milliGRAM(s) Oral daily  pyridoxine 100 milliGRAM(s) Oral daily  sodium chloride 0.65% Nasal 1 Spray(s) Both Nostrils four times a day  tiotropium 18 MICROgram(s) Capsule 1 Capsule(s) Inhalation daily    MEDICATIONS  (PRN):  acetaminophen   Tablet 650 milliGRAM(s) Oral every 6 hours PRN For Temp greater than 38 C (100.4 F)  ALBUTerol/ipratropium for Nebulization 3 milliLiter(s) Nebulizer every 6 hours PRN Shortness of Breath and/or Wheezing      Allergies    penicillin (Rash)    Intolerances        PHYSICAL EXAM:  VITALS: T(C): 36.3 (05-02-18 @ 16:25)  T(F): 97.4 (05-02-18 @ 16:25), Max: 97.9 (05-01-18 @ 21:08)  HR: 68 (05-02-18 @ 16:25) (62 - 68)  BP: 120/64 (05-02-18 @ 16:25) (100/56 - 126/67)  RR:  (18 - 18)  SpO2:  (93% - 98%)  GENERAL: NAD,   EYES: No proptosis,  HEENT:  Atraumatic, Normocephalic,   THYROID: Normal size, no palpable nodules  RESPIRATORY: Clear to auscultation bilaterally  CARDIOVASCULAR: Regular rhythm; No murmurs; edema left, chronic.  GI: Soft, nontender, non distended, normal bowel sounds  SKIN: Dry, intact, No rashes or lesions  MUSCULOSKELETAL: decreased strength  NEURO: extraocular movements intact, no tremor, reduced reflexes  PSYCH: Alert and oriented x 3, normal affect, normal mood                                8.2    4.71  )-----------( 169      ( 02 May 2018 07:38 )             25.3       05-01    142  |  106  |  22  ----------------------------<  69<L>  3.6   |  28  |  0.59    EGFR if : 109  EGFR if non : 94    Ca    8.9      05-01        Thyroid Function Tests:  04-30 @ 09:24 TSH -- FreeT4 0.7 T3 -- Anti TPO -- Anti Thyroglobulin Ab -- TSI --  04-29 @ 08:03 TSH 10.92 FreeT4 -- T3 -- Anti TPO -- Anti Thyroglobulin Ab -- TSI --

## 2018-05-02 NOTE — PROGRESS NOTE ADULT - SUBJECTIVE AND OBJECTIVE BOX
CHIEF COMPLAINT: no cough or new sob-awaiting counts (DC pending)--no hemoptysis    Interval Events: GI evaln in progress-[facility]    REVIEW OF SYSTEMS:  Constitutional: No fevers or chills. No weight loss. + fatigue or generalized malaise.  Eyes: No itching or discharge from the eyes  ENT: No ear pain. No ear discharge. No nasal congestion. No post nasal drip. No epistaxis. No throat pain. No sore throat. No difficulty swallowing.   CV: No chest pain. No palpitations. No lightheadedness or dizziness.   Resp: No dyspnea at rest. No dyspnea on exertion. No orthopnea. No wheezing. No cough. No stridor. No sputum production. No chest pain with respiration.  GI: No nausea. No vomiting. No diarrhea.  MSK: No joint pain or pain in any extremities  Integumentary: No skin lesions. + pedal edema.  Neurological: No gross motor weakness. No sensory changes.  [ +] All other systems negative  [ ] Unable to assess ROS because ________    OBJECTIVE:  ICU Vital Signs Last 24 Hrs  T(C): 36.5 (02 May 2018 04:54), Max: 36.6 (01 May 2018 21:08)  T(F): 97.7 (02 May 2018 04:54), Max: 97.9 (01 May 2018 21:08)  HR: 65 (02 May 2018 04:54) (64 - 81)  BP: 126/67 (02 May 2018 04:54) (93/56 - 126/67)  BP(mean): --  ABP: --  ABP(mean): --  RR: 18 (02 May 2018 04:54) (18 - 18)  SpO2: 95% (02 May 2018 04:54) (93% - 100%)        04-30 @ 07:01  -  05-01 @ 07:00  --------------------------------------------------------  IN: 660 mL / OUT: 700 mL / NET: -40 mL    05-01 @ 07:01  -  05-02 @ 05:40  --------------------------------------------------------  IN: 420 mL / OUT: 450 mL / NET: -30 mL      CAPILLARY BLOOD GLUCOSE          PHYSICAL EXAM: NAD in bed  General: Awake, alert, oriented X 3.   HEENT: Atraumatic, normocephalic.                 Mallampatti Grade 3                No nasal congestion.                No tonsillar or pharyngeal exudates.  Lymph Nodes: No palpable lymphadenopathy  Neck: No JVD. No carotid bruit.   Respiratory: Normal chest expansion                         Normal percussion                         Normal and equal air entry                         No wheeze, rhonchi or rales.  Cardiovascular: S1 S2 normal. No murmurs, rubs or gallops.   Abdomen: Soft, non-tender, non-distended. No organomegaly.  Extremities: Warm to touch. Peripheral pulse palpable. + pedal edema.   Skin: No rashes or skin lesions  Neurological: Motor and sensory examination equal and normal in all four extremities.  Psychiatry: Appropriate mood and affect.    HOSPITAL MEDICATIONS:  MEDICATIONS  (STANDING):  aspirin enteric coated 81 milliGRAM(s) Oral daily  BACItracin   Ointment 1 Application(s) Topical two times a day  buDESOnide  80 MICROgram(s)/formoterol 4.5 MICROgram(s) Inhaler 2 Puff(s) Inhalation two times a day  clopidogrel Tablet 75 milliGRAM(s) Oral daily  cyanocobalamin 500 MICROGram(s) Oral daily  finasteride 5 milliGRAM(s) Oral at bedtime  folic acid 1 milliGRAM(s) Oral daily  heparin  Injectable 5000 Unit(s) SubCutaneous every 12 hours  levothyroxine 25 MICROGram(s) Oral daily  midodrine 10 milliGRAM(s) Oral <User Schedule>  montelukast 10 milliGRAM(s) Oral daily  phenytoin   Capsule 200 milliGRAM(s) Oral two times a day  predniSONE   Tablet 10 milliGRAM(s) Oral daily  pyridoxine 100 milliGRAM(s) Oral daily  sodium chloride 0.65% Nasal 1 Spray(s) Both Nostrils four times a day  tiotropium 18 MICROgram(s) Capsule 1 Capsule(s) Inhalation daily    MEDICATIONS  (PRN):  acetaminophen   Tablet 650 milliGRAM(s) Oral every 6 hours PRN For Temp greater than 38 C (100.4 F)  ALBUTerol/ipratropium for Nebulization 3 milliLiter(s) Nebulizer every 6 hours PRN Shortness of Breath and/or Wheezing      LABS:                        8.4    4.41  )-----------( 160      ( 01 May 2018 07:51 )             25.8     05-01    142  |  106  |  22  ----------------------------<  69<L>  3.6   |  28  |  0.59    Ca    8.9      01 May 2018 05:57                MICROBIOLOGY:     RADIOLOGY:  [ ] Reviewed and interpreted by me    Point of Care Ultrasound Findings:    PFT:    EKG:

## 2018-05-02 NOTE — PROGRESS NOTE ADULT - SUBJECTIVE AND OBJECTIVE BOX
no  melena/abd pain    REVIEW OF SYSTEMS:  CONSTITUTIONAL: No weakness,  no   fevers      RESPIRATORY:  No    shortness of breath  , no  wheezing  CARDIOVASCULAR: No chest pain,   no  palpitations, no  cough  GASTROINTESTINAL: No abdominal  pain, no  vomiting, no  diarrhea  NEUROLOGICAL: No  focal  weakness    MEDICATIONS  (STANDING):  aspirin enteric coated 81 milliGRAM(s) Oral daily  BACItracin   Ointment 1 Application(s) Topical two times a day  buDESOnide  80 MICROgram(s)/formoterol 4.5 MICROgram(s) Inhaler 2 Puff(s) Inhalation two times a day  clopidogrel Tablet 75 milliGRAM(s) Oral daily  cyanocobalamin 500 MICROGram(s) Oral daily  finasteride 5 milliGRAM(s) Oral at bedtime  folic acid 1 milliGRAM(s) Oral daily  heparin  Injectable 5000 Unit(s) SubCutaneous every 12 hours  levothyroxine 25 MICROGram(s) Oral daily  midodrine 10 milliGRAM(s) Oral <User Schedule>  montelukast 10 milliGRAM(s) Oral daily  phenytoin   Capsule 200 milliGRAM(s) Oral two times a day  predniSONE   Tablet 10 milliGRAM(s) Oral daily  pyridoxine 100 milliGRAM(s) Oral daily  sodium chloride 0.65% Nasal 1 Spray(s) Both Nostrils four times a day  tiotropium 18 MICROgram(s) Capsule 1 Capsule(s) Inhalation daily    MEDICATIONS  (PRN):  acetaminophen   Tablet 650 milliGRAM(s) Oral every 6 hours PRN For Temp greater than 38 C (100.4 F)  ALBUTerol/ipratropium for Nebulization 3 milliLiter(s) Nebulizer every 6 hours PRN Shortness of Breath and/or Wheezing      Vital Signs Last 24 Hrs  T(C): 36.5 (02 May 2018 04:54), Max: 36.6 (01 May 2018 21:08)  T(F): 97.7 (02 May 2018 04:54), Max: 97.9 (01 May 2018 21:08)  HR: 65 (02 May 2018 04:54) (64 - 81)  BP: 126/67 (02 May 2018 04:54) (93/56 - 126/67)  BP(mean): --  RR: 18 (02 May 2018 04:54) (18 - 18)  SpO2: 95% (02 May 2018 04:54) (93% - 100%)  CAPILLARY BLOOD GLUCOSE        I&O's Summary    01 May 2018 07:01  -  02 May 2018 07:00  --------------------------------------------------------  IN: 420 mL / OUT: 450 mL / NET: -30 mL        PHYSICAL EXAM:  HEAD:  Atraumatic, Normocephalic  NECK: Supple, No   JVD  CHEST/LUNG:   no     rales,     no,    rhonchi  HEART: Regular rate and rhythm;         murmur  ABDOMEN: Soft, Nontender, ;   EXTREMITIES:        edema  NEUROLOGY:  alert    LABS:                        8.4    4.41  )-----------( 160      ( 01 May 2018 07:51 )             25.8     05-01    142  |  106  |  22  ----------------------------<  69<L>  3.6   |  28  |  0.59    Ca    8.9      01 May 2018 05:57                      Thyroid Stimulating Hormone, Serum: 10.92 uIU/mL (04-29 @ 08:03)          Consultant(s) Notes Reviewed:      Care Discussed with Consultants/Other Providers:

## 2018-05-02 NOTE — PROGRESS NOTE ADULT - ATTENDING COMMENTS
as above-  hemoptysis not localized to ENT source: CTS evaluation Dr. Christian Weinstein ? FOB (no hemoptysis O/N)--no intervention planned  Asthma/Chronic bronchitis rx as outlined above-acapella/incentive spirometry                Aspiration precautions  Cards/endo follow up-for BP (no adrenal insufficiency but felt hypothyroid)   Fluid OL--diurese as able here.  PT needed      GI follow up-H/H stable--out pt evaluation planned    William Juarez MD-Pulmonary   [phone]

## 2018-05-03 VITALS — SYSTOLIC BLOOD PRESSURE: 100 MMHG | HEART RATE: 78 BPM | DIASTOLIC BLOOD PRESSURE: 58 MMHG

## 2018-05-03 PROCEDURE — 80048 BASIC METABOLIC PNL TOTAL CA: CPT

## 2018-05-03 PROCEDURE — 82435 ASSAY OF BLOOD CHLORIDE: CPT

## 2018-05-03 PROCEDURE — P9016: CPT

## 2018-05-03 PROCEDURE — 84443 ASSAY THYROID STIM HORMONE: CPT

## 2018-05-03 PROCEDURE — 97116 GAIT TRAINING THERAPY: CPT

## 2018-05-03 PROCEDURE — 84295 ASSAY OF SERUM SODIUM: CPT

## 2018-05-03 PROCEDURE — 71045 X-RAY EXAM CHEST 1 VIEW: CPT

## 2018-05-03 PROCEDURE — 87186 SC STD MICRODIL/AGAR DIL: CPT

## 2018-05-03 PROCEDURE — 86850 RBC ANTIBODY SCREEN: CPT

## 2018-05-03 PROCEDURE — 82947 ASSAY GLUCOSE BLOOD QUANT: CPT

## 2018-05-03 PROCEDURE — 86900 BLOOD TYPING SEROLOGIC ABO: CPT

## 2018-05-03 PROCEDURE — 84132 ASSAY OF SERUM POTASSIUM: CPT

## 2018-05-03 PROCEDURE — 99285 EMERGENCY DEPT VISIT HI MDM: CPT | Mod: 25

## 2018-05-03 PROCEDURE — 83735 ASSAY OF MAGNESIUM: CPT

## 2018-05-03 PROCEDURE — 93970 EXTREMITY STUDY: CPT

## 2018-05-03 PROCEDURE — 86923 COMPATIBILITY TEST ELECTRIC: CPT

## 2018-05-03 PROCEDURE — 82550 ASSAY OF CK (CPK): CPT

## 2018-05-03 PROCEDURE — 82533 TOTAL CORTISOL: CPT

## 2018-05-03 PROCEDURE — 84100 ASSAY OF PHOSPHORUS: CPT

## 2018-05-03 PROCEDURE — 82272 OCCULT BLD FECES 1-3 TESTS: CPT

## 2018-05-03 PROCEDURE — 85014 HEMATOCRIT: CPT

## 2018-05-03 PROCEDURE — 97530 THERAPEUTIC ACTIVITIES: CPT

## 2018-05-03 PROCEDURE — 82553 CREATINE MB FRACTION: CPT

## 2018-05-03 PROCEDURE — 99232 SBSQ HOSP IP/OBS MODERATE 35: CPT

## 2018-05-03 PROCEDURE — 87798 DETECT AGENT NOS DNA AMP: CPT

## 2018-05-03 PROCEDURE — 87086 URINE CULTURE/COLONY COUNT: CPT

## 2018-05-03 PROCEDURE — 84439 ASSAY OF FREE THYROXINE: CPT

## 2018-05-03 PROCEDURE — 85027 COMPLETE CBC AUTOMATED: CPT

## 2018-05-03 PROCEDURE — 84436 ASSAY OF TOTAL THYROXINE: CPT

## 2018-05-03 PROCEDURE — 87486 CHLMYD PNEUM DNA AMP PROBE: CPT

## 2018-05-03 PROCEDURE — 82962 GLUCOSE BLOOD TEST: CPT

## 2018-05-03 PROCEDURE — 82803 BLOOD GASES ANY COMBINATION: CPT

## 2018-05-03 PROCEDURE — 85730 THROMBOPLASTIN TIME PARTIAL: CPT

## 2018-05-03 PROCEDURE — 84479 ASSAY OF THYROID (T3 OR T4): CPT

## 2018-05-03 PROCEDURE — 97161 PT EVAL LOW COMPLEX 20 MIN: CPT

## 2018-05-03 PROCEDURE — 96374 THER/PROPH/DIAG INJ IV PUSH: CPT

## 2018-05-03 PROCEDURE — 36430 TRANSFUSION BLD/BLD COMPNT: CPT

## 2018-05-03 PROCEDURE — 87633 RESP VIRUS 12-25 TARGETS: CPT

## 2018-05-03 PROCEDURE — 83605 ASSAY OF LACTIC ACID: CPT

## 2018-05-03 PROCEDURE — 80202 ASSAY OF VANCOMYCIN: CPT

## 2018-05-03 PROCEDURE — 81001 URINALYSIS AUTO W/SCOPE: CPT

## 2018-05-03 PROCEDURE — 76705 ECHO EXAM OF ABDOMEN: CPT

## 2018-05-03 PROCEDURE — 96375 TX/PRO/DX INJ NEW DRUG ADDON: CPT

## 2018-05-03 PROCEDURE — 85610 PROTHROMBIN TIME: CPT

## 2018-05-03 PROCEDURE — 84146 ASSAY OF PROLACTIN: CPT

## 2018-05-03 PROCEDURE — 80053 COMPREHEN METABOLIC PANEL: CPT

## 2018-05-03 PROCEDURE — 93005 ELECTROCARDIOGRAM TRACING: CPT

## 2018-05-03 PROCEDURE — 87040 BLOOD CULTURE FOR BACTERIA: CPT

## 2018-05-03 PROCEDURE — 71250 CT THORAX DX C-: CPT

## 2018-05-03 PROCEDURE — 86901 BLOOD TYPING SEROLOGIC RH(D): CPT

## 2018-05-03 PROCEDURE — 87581 M.PNEUMON DNA AMP PROBE: CPT

## 2018-05-03 PROCEDURE — 94640 AIRWAY INHALATION TREATMENT: CPT

## 2018-05-03 PROCEDURE — 87449 NOS EACH ORGANISM AG IA: CPT

## 2018-05-03 PROCEDURE — 84484 ASSAY OF TROPONIN QUANT: CPT

## 2018-05-03 PROCEDURE — 92610 EVALUATE SWALLOWING FUNCTION: CPT

## 2018-05-03 PROCEDURE — 84481 FREE ASSAY (FT-3): CPT

## 2018-05-03 PROCEDURE — 84480 ASSAY TRIIODOTHYRONINE (T3): CPT

## 2018-05-03 PROCEDURE — 82977 ASSAY OF GGT: CPT

## 2018-05-03 PROCEDURE — 82330 ASSAY OF CALCIUM: CPT

## 2018-05-03 PROCEDURE — 93306 TTE W/DOPPLER COMPLETE: CPT

## 2018-05-03 RX ORDER — FOLIC ACID 0.8 MG
1 TABLET ORAL
Qty: 0 | Refills: 0 | COMMUNITY
Start: 2018-05-03

## 2018-05-03 RX ORDER — FOLIC ACID 0.8 MG
0 TABLET ORAL
Qty: 0 | Refills: 0 | COMMUNITY
Start: 2018-05-03

## 2018-05-03 RX ORDER — FINASTERIDE 5 MG/1
1 TABLET, FILM COATED ORAL
Qty: 0 | Refills: 0 | COMMUNITY
Start: 2018-05-03

## 2018-05-03 RX ORDER — SIMVASTATIN 20 MG/1
1 TABLET, FILM COATED ORAL
Qty: 0 | Refills: 0 | COMMUNITY

## 2018-05-03 RX ORDER — MIDODRINE HYDROCHLORIDE 2.5 MG/1
10 TABLET ORAL
Qty: 0 | Refills: 0 | COMMUNITY
Start: 2018-05-03

## 2018-05-03 RX ORDER — TIOTROPIUM BROMIDE 18 UG/1
1 CAPSULE ORAL; RESPIRATORY (INHALATION)
Qty: 1 | Refills: 0 | OUTPATIENT
Start: 2018-05-03 | End: 2018-06-01

## 2018-05-03 RX ORDER — FOLIC ACID 0.8 MG
400 TABLET ORAL
Qty: 0 | Refills: 0 | COMMUNITY
Start: 2018-05-03

## 2018-05-03 RX ORDER — TIOTROPIUM BROMIDE 18 UG/1
1 CAPSULE ORAL; RESPIRATORY (INHALATION)
Qty: 0 | Refills: 0 | COMMUNITY
Start: 2018-05-03

## 2018-05-03 RX ORDER — MONTELUKAST 4 MG/1
1 TABLET, CHEWABLE ORAL
Qty: 0 | Refills: 0 | COMMUNITY

## 2018-05-03 RX ORDER — MIDODRINE HYDROCHLORIDE 2.5 MG/1
1 TABLET ORAL
Qty: 90 | Refills: 0 | OUTPATIENT
Start: 2018-05-03 | End: 2018-06-01

## 2018-05-03 RX ORDER — PYRIDOXINE HCL (VITAMIN B6) 100 MG
1 TABLET ORAL
Qty: 0 | Refills: 0 | COMMUNITY

## 2018-05-03 RX ORDER — LEVOTHYROXINE SODIUM 125 MCG
1 TABLET ORAL
Qty: 30 | Refills: 0 | OUTPATIENT
Start: 2018-05-03

## 2018-05-03 RX ORDER — MIDODRINE HYDROCHLORIDE 2.5 MG/1
1 TABLET ORAL
Qty: 0 | Refills: 0 | COMMUNITY
Start: 2018-05-03

## 2018-05-03 RX ORDER — FINASTERIDE 5 MG/1
1 TABLET, FILM COATED ORAL
Qty: 0 | Refills: 0 | COMMUNITY

## 2018-05-03 RX ORDER — BUDESONIDE AND FORMOTEROL FUMARATE DIHYDRATE 160; 4.5 UG/1; UG/1
2 AEROSOL RESPIRATORY (INHALATION)
Qty: 1 | Refills: 0 | OUTPATIENT
Start: 2018-05-03 | End: 2018-06-01

## 2018-05-03 RX ORDER — SODIUM CHLORIDE 0.65 %
1 AEROSOL, SPRAY (ML) NASAL
Qty: 1 | Refills: 0 | OUTPATIENT
Start: 2018-05-03 | End: 2018-06-01

## 2018-05-03 RX ORDER — MIDODRINE HYDROCHLORIDE 2.5 MG/1
0 TABLET ORAL
Qty: 0 | Refills: 0 | COMMUNITY
Start: 2018-05-03

## 2018-05-03 RX ORDER — ALBUTEROL 90 UG/1
2 AEROSOL, METERED ORAL
Qty: 0 | Refills: 0 | COMMUNITY

## 2018-05-03 RX ORDER — ALPRAZOLAM 0.25 MG
1 TABLET ORAL
Qty: 0 | Refills: 0 | COMMUNITY

## 2018-05-03 RX ORDER — BUDESONIDE AND FORMOTEROL FUMARATE DIHYDRATE 160; 4.5 UG/1; UG/1
2 AEROSOL RESPIRATORY (INHALATION)
Qty: 0 | Refills: 0 | COMMUNITY
Start: 2018-05-03

## 2018-05-03 RX ORDER — MONTELUKAST 4 MG/1
1 TABLET, CHEWABLE ORAL
Qty: 0 | Refills: 0 | COMMUNITY
Start: 2018-05-03

## 2018-05-03 RX ORDER — LEVOTHYROXINE SODIUM 125 MCG
1 TABLET ORAL
Qty: 0 | Refills: 0 | COMMUNITY
Start: 2018-05-03

## 2018-05-03 RX ORDER — FOLIC ACID 0.8 MG
1 TABLET ORAL
Qty: 0 | Refills: 0 | COMMUNITY

## 2018-05-03 RX ORDER — EZETIMIBE 10 MG/1
1 TABLET ORAL
Qty: 0 | Refills: 0 | COMMUNITY

## 2018-05-03 RX ORDER — PREGABALIN 225 MG/1
1 CAPSULE ORAL
Qty: 0 | Refills: 0 | COMMUNITY

## 2018-05-03 RX ORDER — SODIUM CHLORIDE 0.65 %
1 AEROSOL, SPRAY (ML) NASAL
Qty: 0 | Refills: 0 | COMMUNITY
Start: 2018-05-03

## 2018-05-03 RX ADMIN — MIDODRINE HYDROCHLORIDE 10 MILLIGRAM(S): 2.5 TABLET ORAL at 06:00

## 2018-05-03 RX ADMIN — PREGABALIN 500 MICROGRAM(S): 225 CAPSULE ORAL at 11:42

## 2018-05-03 RX ADMIN — MONTELUKAST 10 MILLIGRAM(S): 4 TABLET, CHEWABLE ORAL at 11:42

## 2018-05-03 RX ADMIN — HEPARIN SODIUM 5000 UNIT(S): 5000 INJECTION INTRAVENOUS; SUBCUTANEOUS at 05:58

## 2018-05-03 RX ADMIN — Medication 200 MILLIGRAM(S): at 05:56

## 2018-05-03 RX ADMIN — Medication 100 MILLIGRAM(S): at 11:42

## 2018-05-03 RX ADMIN — Medication 1 SPRAY(S): at 11:39

## 2018-05-03 RX ADMIN — TIOTROPIUM BROMIDE 1 CAPSULE(S): 18 CAPSULE ORAL; RESPIRATORY (INHALATION) at 11:43

## 2018-05-03 RX ADMIN — MIDODRINE HYDROCHLORIDE 10 MILLIGRAM(S): 2.5 TABLET ORAL at 11:42

## 2018-05-03 RX ADMIN — CLOPIDOGREL BISULFATE 75 MILLIGRAM(S): 75 TABLET, FILM COATED ORAL at 11:41

## 2018-05-03 RX ADMIN — Medication 1 MILLIGRAM(S): at 11:41

## 2018-05-03 RX ADMIN — Medication 10 MILLIGRAM(S): at 05:58

## 2018-05-03 RX ADMIN — BUDESONIDE AND FORMOTEROL FUMARATE DIHYDRATE 2 PUFF(S): 160; 4.5 AEROSOL RESPIRATORY (INHALATION) at 05:56

## 2018-05-03 RX ADMIN — Medication 25 MICROGRAM(S): at 05:58

## 2018-05-03 RX ADMIN — Medication 1 APPLICATION(S): at 05:56

## 2018-05-03 RX ADMIN — Medication 3 MILLILITER(S): at 11:38

## 2018-05-03 RX ADMIN — Medication 1 SPRAY(S): at 05:56

## 2018-05-03 RX ADMIN — Medication 81 MILLIGRAM(S): at 11:41

## 2018-05-03 NOTE — PROGRESS NOTE ADULT - PROBLEM SELECTOR PLAN 9
DVT prophlaxis:  subcutaneous lovenox or heparin as per primary team  sequential teds stockings  early ambulation

## 2018-05-03 NOTE — PROGRESS NOTE ADULT - PROVIDER SPECIALTY LIST ADULT
CT Surgery
Cardiology
Endocrinology
Gastroenterology
Hospitalist
Infectious Disease
Internal Medicine
MICU
Pulmonology
Cardiology
Cardiology
Hospitalist
Pulmonology
Gastroenterology
Internal Medicine
Endocrinology
Pulmonology
Pulmonology

## 2018-05-03 NOTE — PROGRESS NOTE ADULT - PROBLEM SELECTOR PROBLEM 2
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
Hypoglycemia
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
Hypoglycemia
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
Hypoglycemia
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

## 2018-05-03 NOTE — PROGRESS NOTE ADULT - PROBLEM SELECTOR PROBLEM 6
Malignant neoplasm of lower third of esophagus

## 2018-05-03 NOTE — PROGRESS NOTE ADULT - PROBLEM SELECTOR PLAN 8
cards follow up-mult rx

## 2018-05-03 NOTE — PROGRESS NOTE ADULT - SUBJECTIVE AND OBJECTIVE BOX
MEJIA LATIF:[id number],   82yMale followed for:  penicillin (Rash)    PAST MEDICAL & SURGICAL HISTORY:  Coronary artery disease: s/p 3 stents on June 30, 2017 at [facility] Dr. Lavelle Reid  OA (osteoarthritis) of knee: right  Former smoker, stopped smoking in distant past  Rheumatoid arthritis  Seizure disorder: 1 episode approximately 15 yrs ago  Asthma  Hyperlipidemia  BPH (benign prostatic hyperplasia)  HTN (hypertension)  Esophagus cancer  Chronic allergic rhinitis  BCC (basal cell carcinoma): skin  Cerebrovascular accident (CVA)  Anxiety  Anastomotic leak following esophagectomy: [place] [facility] esophagectomy  History of tonsillectomy  H/O heart artery stent: June 30, 2017  Knee arthropathy: left  History of total hip replacement: left  History of hernia repair    FAMILY HISTORY:  Family history of heart attack  Family history of pulmonary embolism: father @ 49 yr old  FH: CAD (coronary artery disease) (Sibling)    MEDICATIONS  (STANDING):  aspirin enteric coated 81 milliGRAM(s) Oral daily  BACItracin   Ointment 1 Application(s) Topical two times a day  buDESOnide  80 MICROgram(s)/formoterol 4.5 MICROgram(s) Inhaler 2 Puff(s) Inhalation two times a day  clopidogrel Tablet 75 milliGRAM(s) Oral daily  cyanocobalamin 500 MICROGram(s) Oral daily  finasteride 5 milliGRAM(s) Oral at bedtime  folic acid 1 milliGRAM(s) Oral daily  heparin  Injectable 5000 Unit(s) SubCutaneous every 12 hours  levothyroxine 25 MICROGram(s) Oral daily  midodrine 10 milliGRAM(s) Oral <User Schedule>  montelukast 10 milliGRAM(s) Oral daily  phenytoin   Capsule 200 milliGRAM(s) Oral two times a day  predniSONE   Tablet 10 milliGRAM(s) Oral daily  pyridoxine 100 milliGRAM(s) Oral daily  sodium chloride 0.65% Nasal 1 Spray(s) Both Nostrils four times a day  tiotropium 18 MICROgram(s) Capsule 1 Capsule(s) Inhalation daily    MEDICATIONS  (PRN):  acetaminophen   Tablet 650 milliGRAM(s) Oral every 6 hours PRN For Temp greater than 38 C (100.4 F)  ALBUTerol/ipratropium for Nebulization 3 milliLiter(s) Nebulizer every 6 hours PRN Shortness of Breath and/or Wheezing      Vital Signs Last 24 Hrs  T(C): 36.5 (03 May 2018 04:03), Max: 36.5 (02 May 2018 20:28)  T(F): 97.7 (03 May 2018 04:03), Max: 97.7 (02 May 2018 20:28)  HR: 69 (03 May 2018 04:03) (62 - 69)  BP: 153/72 (03 May 2018 04:03) (100/56 - 153/72)  BP(mean): --  RR: 18 (03 May 2018 04:03) (18 - 18)  SpO2: 93% (03 May 2018 04:03) (93% - 98%)  nc/at  s1s2  cta  soft, nt, nd no guarding or rebound  no c/c/e    CBC Full  -  ( 02 May 2018 22:57 )  WBC Count : 6.6 K/uL  Hemoglobin : 10.5 g/dL  Hematocrit : 30.4 %  Platelet Count - Automated : 171 K/uL  Mean Cell Volume : 96.1 fl  Mean Cell Hemoglobin : 33.1 pg  Mean Cell Hemoglobin Concentration : 34.4 gm/dL  Auto Neutrophil # : x  Auto Lymphocyte # : x  Auto Monocyte # : x  Auto Eosinophil # : x  Auto Basophil # : x  Auto Neutrophil % : x  Auto Lymphocyte % : x  Auto Monocyte % : x  Auto Eosinophil % : x  Auto Basophil % : x

## 2018-05-03 NOTE — PROGRESS NOTE ADULT - PROBLEM SELECTOR PLAN 4
Start synthroid 25 mcg daily.
D/C on synthroid 25 mcg daily.  F/U TFT's in 2-4 weeks.
ENT follow up--saline etc.

## 2018-05-03 NOTE — PROGRESS NOTE ADULT - PROBLEM SELECTOR PLAN 2
resolved.  Low FS at am are the result of poor nutrition state.
endocrine/cards follow up-midodrine/fludrocortisone
endocrine/cards follow up-midodrine/fludrocortisone
resolved.
resolved.
resolved.  Low FS at am are the result of poor nutrition state.
endocrine/cards follow up-midodrine/fludrocortisone
resolved.  Low FS at am are the result of poor nutrition state.
endocrine/cards follow up-midodrine/fludrocortisone
endocrine/cards follow up-midodrine/fludrocortisone

## 2018-05-03 NOTE — PROGRESS NOTE ADULT - PROBLEM SELECTOR PROBLEM 1
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
Hemoptysis
R/O Hemoptysis
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
R/O Hemoptysis

## 2018-05-03 NOTE — PROGRESS NOTE ADULT - ASSESSMENT
pt  with  h/o htn, cad, stents, br  asthma, seizure, cva , ca esophagus,  c/c  cachexia, RA  on prednisone   s./p  recent cath,   with rico, to  LAD and angioplasty, D1, ON 4/17,  an d since then, had  low  bp,, and  was  on midodrine, on last admission  h/o  anemia ,   on c/c . prednisone,, for  RA,    low  bp,  stress dose  hydrocortisone,   does  not  appear to have  sepsis,    ID called  c/.c anemia  Pt  is dnr now,    echo  with modertae AS. / ?  severe   asa/ plavix   given recent pci  ent eval and endo  dr lida cruz, for  low  bp,  and  on  c/c  prednisone at home, noted  per  endo, pt  does  not have adrenal insufficiency   no artificial  feeding per  family.  and pt wants  regular diet  now  with guaiac positive stools, seen by gi, , no  gi  w/p per  dr rutledge  s.p prbc, hb is  10 today    ok for  d/c. f/p dr benita byrd, 2 days

## 2018-05-03 NOTE — PROGRESS NOTE ADULT - ATTENDING COMMENTS
as above-  hemoptysis not localized to ENT source: CTS evaluation Dr. Christian Weinstein ? FOB (no hemoptysis O/N)--no intervention planned  Asthma/Chronic bronchitis rx as outlined above-acapella/incentive spirometry                Aspiration precautions  Cards/endo follow up-for BP (no adrenal insufficiency but felt hypothyroid)   Fluid OL--diurese as able here.  PT needed      GI follow up-H/H stable--out pt evaluation planned    William Juarez MD-Pulmonary   [phone] as above-  hemoptysis not localized to ENT source: CTS evaluation Dr. Christian Weinstein ? FOB (no hemoptysis O/N)--no intervention planned  Asthma/Chronic bronchitis rx as outlined above-acapella/incentive spirometry                Aspiration precautions  Cards/endo follow up-for BP (no adrenal insufficiency but felt hypothyroid)   Fluid OL--diurese as able here.  PT needed      GI follow up-H/H stable--out pt evaluation planned--however pt is still requiring PRBC.    William Juarez MD-Pulmonary   [phone]

## 2018-05-03 NOTE — PROGRESS NOTE ADULT - ASSESSMENT
I have done endo and colon in the past.  He had esophageal cancer resected past 6 months with post op leak.  There is nothing that would be amenable to endoscopic rx and Robert has brown stool on exam by me two days ago.  no endosopcy planned.

## 2018-05-03 NOTE — PROGRESS NOTE ADULT - PROBLEM SELECTOR PLAN 7
GI prophylaxis:  PPI pre breakfast  aspiration precautions-all meals are to OOB as able
GI prophylaxis:--out pt f/up  PPI pre breakfast  aspiration precautions-all meals are to OOB as able
GI prophylaxis:--out pt f/up  PPI pre breakfast  aspiration precautions-all meals are to OOB as able
GI prophylaxis:  PPI pre breakfast  aspiration precautions-all meals are to OOB as able
GI prophylaxis:  PPI pre breakfast  aspiration precautions-all meals are to OOB as able

## 2018-05-03 NOTE — PROGRESS NOTE ADULT - PROBLEM SELECTOR PLAN 1
Prednisone 10 mg PO.  Florinef 0.1 mg daily and increase salt intake to help boast BP.
- No acute thoracic surgery intervention at this time  - Recommending outpatient follow up with Dr. Weinstein
? ENT source-but evaluation was unrevealing  CTS evaluation by Christian Weinstein et al.-to consider role for bronchoscopy (no hemoptysis O/N)
? ENT source-but evaluation was unrevealing  CTS evaluation by Christian Weinstein et al.-to consider role for bronchoscopy (no hemoptysis O/N)
Can D/C on Prednisone 10 mg PO.  As an out patient if he is doing well will decrease to 5 mg daily.
D/C hydrocortisone IV start prednisone 10 mg PO.  Add florinef 0.1 mg daily and increase salt intake to help boast BP.
Will wait for PM cortisol level sent, once back if nornal please change back hydrocortisone IV to prednisone 20 mg PO.  Hydrocortisone had a mineralocorticoid effect, on the change to prednisone can add florinef 0.1 mg daily and increase salt intake to help boast BP.
? ENT source-but evaluation was unrevealing  CTS evaluation by Christian Weinstein et al.-to consider role for bronchoscopy (no hemoptysis O/N)
Prednisone 10 mg PO for three days then lower to 5 mg.  Florinef 0.1 mg daily and increase salt intake to help boast BP.
? ENT source-but evaluation was unrevealing  CTS evaluation by Christian Weinstein et al.-to consider role for bronchoscopy (no hemoptysis O/N)
likely due to ENT source--no role for bronchoscopy

## 2018-05-03 NOTE — PROGRESS NOTE ADULT - PROBLEM SELECTOR PROBLEM 3
Hypotension, unspecified hypotension type

## 2018-05-03 NOTE — PROGRESS NOTE ADULT - ASSESSMENT
82M CAD with multiple stents (most recently 4/2018), asthma, former smoker, esophageal cancer with esophagectomy and gastric pullup, RA on chronic steroids who presents with hypotension in the setting of secondary adrenal insufficiency who now has had epistaxis with expectoration of blood (?hemoptysis vs hematemesis) and now with hypotension in the setting of decreased steroid dose    ENT evaluation-did not reveal any source  CTS called 4/29--no intervention planned    GI -[facility] following-"no intervention planned here" 5/2 82M CAD with multiple stents (most recently 4/2018), asthma, former smoker, esophageal cancer with esophagectomy and gastric pullup, RA on chronic steroids who presents with hypotension in the setting of secondary adrenal insufficiency who now has had epistaxis with expectoration of blood (?hemoptysis vs hematemesis) and now with hypotension in the setting of decreased steroid dose    ENT evaluation-did not reveal any source  CTS called 4/29--no intervention planned    GI -[facility] following-"no intervention planned here" 5/2  PRBC 5/2

## 2018-05-03 NOTE — PROGRESS NOTE ADULT - SUBJECTIVE AND OBJECTIVE BOX
CHIEF COMPLAINT:    Interval Events:    REVIEW OF SYSTEMS:  Constitutional: No fevers or chills. No weight loss. No fatigue or generalized malaise.  Eyes: No itching or discharge from the eyes  ENT: No ear pain. No ear discharge. No nasal congestion. No post nasal drip. No epistaxis. No throat pain. No sore throat. No difficulty swallowing.   CV: No chest pain. No palpitations. No lightheadedness or dizziness.   Resp: No dyspnea at rest. No dyspnea on exertion. No orthopnea. No wheezing. No cough. No stridor. No sputum production. No chest pain with respiration.  GI: No nausea. No vomiting. No diarrhea.  MSK: No joint pain or pain in any extremities  Integumentary: No skin lesions. No pedal edema.  Neurological: No gross motor weakness. No sensory changes.  [ ] All other systems negative  [ ] Unable to assess ROS because ________    OBJECTIVE:  ICU Vital Signs Last 24 Hrs  T(C): 36.5 (03 May 2018 04:03), Max: 36.5 (02 May 2018 04:54)  T(F): 97.7 (03 May 2018 04:03), Max: 97.7 (02 May 2018 04:54)  HR: 69 (03 May 2018 04:03) (62 - 69)  BP: 153/72 (03 May 2018 04:03) (100/56 - 153/72)  BP(mean): --  ABP: --  ABP(mean): --  RR: 18 (03 May 2018 04:03) (18 - 18)  SpO2: 93% (03 May 2018 04:03) (93% - 98%)        05-01 @ 07:01 - 05-02 @ 07:00  --------------------------------------------------------  IN: 420 mL / OUT: 450 mL / NET: -30 mL    05-02 @ 07:01  -  05-03 @ 04:53  --------------------------------------------------------  IN: 840 mL / OUT: 900 mL / NET: -60 mL      CAPILLARY BLOOD GLUCOSE          PHYSICAL EXAM:  General: Awake, alert, oriented X 3.   HEENT: Atraumatic, normocephalic.                 Mallampatti Grade                 No nasal congestion.                No tonsillar or pharyngeal exudates.  Lymph Nodes: No palpable lymphadenopathy  Neck: No JVD. No carotid bruit.   Respiratory: Normal chest expansion                         Normal percussion                         Normal and equal air entry                         No wheeze, rhonchi or rales.  Cardiovascular: S1 S2 normal. No murmurs, rubs or gallops.   Abdomen: Soft, non-tender, non-distended. No organomegaly.  Extremities: Warm to touch. Peripheral pulse palpable. No pedal edema.   Skin: No rashes or skin lesions  Neurological: Motor and sensory examination equal and normal in all four extremities.  Psychiatry: Appropriate mood and affect.    HOSPITAL MEDICATIONS:  MEDICATIONS  (STANDING):  aspirin enteric coated 81 milliGRAM(s) Oral daily  BACItracin   Ointment 1 Application(s) Topical two times a day  buDESOnide  80 MICROgram(s)/formoterol 4.5 MICROgram(s) Inhaler 2 Puff(s) Inhalation two times a day  clopidogrel Tablet 75 milliGRAM(s) Oral daily  cyanocobalamin 500 MICROGram(s) Oral daily  finasteride 5 milliGRAM(s) Oral at bedtime  folic acid 1 milliGRAM(s) Oral daily  heparin  Injectable 5000 Unit(s) SubCutaneous every 12 hours  levothyroxine 25 MICROGram(s) Oral daily  midodrine 10 milliGRAM(s) Oral <User Schedule>  montelukast 10 milliGRAM(s) Oral daily  phenytoin   Capsule 200 milliGRAM(s) Oral two times a day  predniSONE   Tablet 10 milliGRAM(s) Oral daily  pyridoxine 100 milliGRAM(s) Oral daily  sodium chloride 0.65% Nasal 1 Spray(s) Both Nostrils four times a day  tiotropium 18 MICROgram(s) Capsule 1 Capsule(s) Inhalation daily    MEDICATIONS  (PRN):  acetaminophen   Tablet 650 milliGRAM(s) Oral every 6 hours PRN For Temp greater than 38 C (100.4 F)  ALBUTerol/ipratropium for Nebulization 3 milliLiter(s) Nebulizer every 6 hours PRN Shortness of Breath and/or Wheezing      LABS:                        10.5   6.6   )-----------( 171      ( 02 May 2018 22:57 )             30.4     05-01    142  |  106  |  22  ----------------------------<  69<L>  3.6   |  28  |  0.59    Ca    8.9      01 May 2018 05:57                MICROBIOLOGY:     RADIOLOGY:  [ ] Reviewed and interpreted by me    Point of Care Ultrasound Findings:    PFT:    EKG: CHIEF COMPLAINT: poor night--needed PRBC, some cough-greenish production--which cleared (no blood), leg pains    Interval Events: PRBC--minimal walking    REVIEW OF SYSTEMS:  Constitutional: No fevers or chills. No weight loss. + fatigue or generalized malaise.  Eyes: No itching or discharge from the eyes  ENT: No ear pain. No ear discharge. No nasal congestion. No post nasal drip. No epistaxis. No throat pain. No sore throat. No difficulty swallowing.   CV: No chest pain. No palpitations. No lightheadedness or dizziness.   Resp: No dyspnea at rest. No dyspnea on exertion. No orthopnea. No wheezing. + cough. No stridor. No sputum production. No chest pain with respiration.  GI: No nausea. No vomiting. No diarrhea.  MSK: No joint pain or pain in any extremities  Integumentary: No skin lesions. + pedal edema.  Neurological: No gross motor weakness. No sensory changes.  [+ ] All other systems negative  [ ] Unable to assess ROS because ________    OBJECTIVE:  ICU Vital Signs Last 24 Hrs  T(C): 36.5 (03 May 2018 04:03), Max: 36.5 (02 May 2018 04:54)  T(F): 97.7 (03 May 2018 04:03), Max: 97.7 (02 May 2018 04:54)  HR: 69 (03 May 2018 04:03) (62 - 69)  BP: 153/72 (03 May 2018 04:03) (100/56 - 153/72)  BP(mean): --  ABP: --  ABP(mean): --  RR: 18 (03 May 2018 04:03) (18 - 18)  SpO2: 93% (03 May 2018 04:03) (93% - 98%)        05-01 @ 07:01  -  05-02 @ 07:00  --------------------------------------------------------  IN: 420 mL / OUT: 450 mL / NET: -30 mL    05-02 @ 07:01  -  05-03 @ 04:53  --------------------------------------------------------  IN: 840 mL / OUT: 900 mL / NET: -60 mL      CAPILLARY BLOOD GLUCOSE          PHYSICAL EXAM: NAD in bed  General: Awake, alert, oriented X 3.   HEENT: Atraumatic, normocephalic.                 Mallampatti Grade 3                No nasal congestion.                No tonsillar or pharyngeal exudates.  Lymph Nodes: No palpable lymphadenopathy  Neck: No JVD. No carotid bruit.   Respiratory: Normal chest expansion-reduced bs bases                         Normal percussion                         Normal and equal air entry                         No wheeze, rhonchi or rales.  Cardiovascular: S1 S2 normal. No murmurs, rubs or gallops.   Abdomen: Soft, non-tender, non-distended. No organomegaly.  Extremities: Warm to touch. Peripheral pulse palpable. + pedal edema.   Skin: No rashes or skin lesions  Neurological: Motor and sensory examination equal and normal in all four extremities.  Psychiatry: Appropriate mood and affect.    HOSPITAL MEDICATIONS:  MEDICATIONS  (STANDING):  aspirin enteric coated 81 milliGRAM(s) Oral daily  BACItracin   Ointment 1 Application(s) Topical two times a day  buDESOnide  80 MICROgram(s)/formoterol 4.5 MICROgram(s) Inhaler 2 Puff(s) Inhalation two times a day  clopidogrel Tablet 75 milliGRAM(s) Oral daily  cyanocobalamin 500 MICROGram(s) Oral daily  finasteride 5 milliGRAM(s) Oral at bedtime  folic acid 1 milliGRAM(s) Oral daily  heparin  Injectable 5000 Unit(s) SubCutaneous every 12 hours  levothyroxine 25 MICROGram(s) Oral daily  midodrine 10 milliGRAM(s) Oral <User Schedule>  montelukast 10 milliGRAM(s) Oral daily  phenytoin   Capsule 200 milliGRAM(s) Oral two times a day  predniSONE   Tablet 10 milliGRAM(s) Oral daily  pyridoxine 100 milliGRAM(s) Oral daily  sodium chloride 0.65% Nasal 1 Spray(s) Both Nostrils four times a day  tiotropium 18 MICROgram(s) Capsule 1 Capsule(s) Inhalation daily    MEDICATIONS  (PRN):  acetaminophen   Tablet 650 milliGRAM(s) Oral every 6 hours PRN For Temp greater than 38 C (100.4 F)  ALBUTerol/ipratropium for Nebulization 3 milliLiter(s) Nebulizer every 6 hours PRN Shortness of Breath and/or Wheezing      LABS:                        10.5   6.6   )-----------( 171      ( 02 May 2018 22:57 )             30.4     05-01    142  |  106  |  22  ----------------------------<  69<L>  3.6   |  28  |  0.59    Ca    8.9      01 May 2018 05:57                MICROBIOLOGY:     RADIOLOGY:  [ ] Reviewed and interpreted by me    Point of Care Ultrasound Findings:    PFT:    EKG:

## 2018-05-03 NOTE — PROGRESS NOTE ADULT - PROBLEM SELECTOR PLAN 3
By now adrenal insuff. should not be a factor.  Treat like any patient with orthostatic hypotension due to significant weight loss: as above.  Will repeat TSH Sunday.      Increase plasma volume- H/H 9/27.4 transfuse patient.  On midodrine can add florinef 0.1 mg daily to boast plasma volume.
Keep prednisone 10 mg daily.      Increase plasma volume- H/H 9/27.4 transfuse patient.  On midodrine can add florinef 0.1 mg daily to boast plasma volume.
By now adrenal insuff. should not be a factor.  Treat like any patient with orthostatic hypotension due to significant weight loss: as above.    Increase plasma volume- H/H 9/27.4 transfuse patient.  On midodrine can add florinef 0.1 mg daily to boast plasma volume.
Keep prednisone 10 mg daily for now as above      Increase plasma volume- H/H 9/27.4 transfuse patient.  On midodrine can add florinef 0.1 mg daily to boast plasma volume.
Most probably by now adrenal insuff. should not be a factor.  Treat like any patient with orthostatic hypotension due to significant weight loss:  Increase salt intake.  Increase plasma volume- H/H 9/27.4 transfuse patient.  On midodrine can add florinef 0.1 mg daily to boast plasma volume.
no evidence for sepsis--as above

## 2018-05-03 NOTE — PROGRESS NOTE ADULT - PROBLEM SELECTOR PROBLEM 5
Mild intermittent asthma without complication

## 2018-05-03 NOTE — PROGRESS NOTE ADULT - PROBLEM SELECTOR PLAN 5
symbicort 80 bid  spiriva 1 puff q day  singulair 10mg q hs  acapella  pulmonary toilet-incentive spirometry, Chest Pt-acapella/chest vest-up to 5 times per day.    maintain oxygen levels above 90%-supplemental oxygen via nasal canula-humidified    All nebulized therapy is to be administered via hand held nebulizer-(patient must gargle and spit with water after use)

## 2018-05-03 NOTE — PROGRESS NOTE ADULT - SUBJECTIVE AND OBJECTIVE BOX
- Patient seen and examined.  - In summary, patient is a 82 year old man who presented with weakness and fatigue. (21 Apr 2018 11:11)         *****MEDICATIONS:    MEDICATIONS  (STANDING):  aspirin enteric coated 81 milliGRAM(s) Oral daily  BACItracin   Ointment 1 Application(s) Topical two times a day  buDESOnide  80 MICROgram(s)/formoterol 4.5 MICROgram(s) Inhaler 2 Puff(s) Inhalation two times a day  clopidogrel Tablet 75 milliGRAM(s) Oral daily  cyanocobalamin 500 MICROGram(s) Oral daily  finasteride 5 milliGRAM(s) Oral at bedtime  folic acid 1 milliGRAM(s) Oral daily  heparin  Injectable 5000 Unit(s) SubCutaneous every 12 hours  levothyroxine 25 MICROGram(s) Oral daily  midodrine 10 milliGRAM(s) Oral <User Schedule>  montelukast 10 milliGRAM(s) Oral daily  phenytoin   Capsule 200 milliGRAM(s) Oral two times a day  predniSONE   Tablet 10 milliGRAM(s) Oral daily  pyridoxine 100 milliGRAM(s) Oral daily  sodium chloride 0.65% Nasal 1 Spray(s) Both Nostrils four times a day  tiotropium 18 MICROgram(s) Capsule 1 Capsule(s) Inhalation daily    MEDICATIONS  (PRN):  acetaminophen   Tablet 650 milliGRAM(s) Oral every 6 hours PRN For Temp greater than 38 C (100.4 F)  ALBUTerol/ipratropium for Nebulization 3 milliLiter(s) Nebulizer every 6 hours PRN Shortness of Breath and/or Wheezing               ***** REVIEW OF SYSTEM:  GEN: no fever, no chills, no pain  RESP: no SOB, no cough, no sputum  CVS: no chest pain, no palpitations, no edema  GI: no abdominal pain, no nausea, no vomiting, no constipation, no diarrhea  : no dysuria, no frequency  NEURO: no headache, no dizziness  PSYCH: no depression, not anxious  Derm : no itching, no rash         ***** VITAL SIGNS:    T(F): 97.7 (05-03-18 @ 04:03), Max: 97.7 (05-02-18 @ 20:28)  HR: 69 (05-03-18 @ 04:03) (62 - 69)  BP: 153/72 (05-03-18 @ 04:03) (100/56 - 153/72)  RR: 18 (05-03-18 @ 04:03) (18 - 18)  SpO2: 93% (05-03-18 @ 04:03) (93% - 98%)  Wt(kg): --  ,   I&O's Summary    02 May 2018 07:01  -  03 May 2018 07:00  --------------------------------------------------------  IN: 840 mL / OUT: 900 mL / NET: -60 mL             *****PHYSICAL EXAM:  GEN: A&O X 3 , NAD , comfortable  HEENT: NCAT, EOMI, MMM, no icterus  NECK: Supple, No JVD  CVS: S1S2 , regular , No M/R/G appreciated  PULM: CTA B/L,  no W/R/R appreciated  ABD.: soft. non tender, non distended,  bowel sounds present  Extrem: intact pulses , no edema noted  Derm: No rash or ecchymosis noted  PSYCH: normal mood, no depression, not anxious         *****LAB AND IMAGING:                                                10.5   6.6   )-----------( 171      ( 02 May 2018 22:57 )             30.4                   [All pertinent recent Imaging/Reports reviewed]  < from: Transthoracic Echocardiogram (04.21.18 @ 14:23) >  Conclusions:  1. Mitral annular calcification, otherwise normal mitral  valve. Mild mitral regurgitation. Mean transmitral valve  gradient equals 4 mm Hg, consistent with mild mitral  stenosis. (HRabout 60s bpm)  2. Calcified trileaflet aortic valve with decreased  opening. Peak transaortic valve gradient equals 34 mm Hg,  mean transaortic valve gradient equals 19 mm Hg, estimated  aortic valve area equals 1.3 sqcm (by continuity equation),  aortic valve velocity time integral equals 59 cm,  consistent with moderate aortic stenosis. Aortic valve  assessment is limited by the presence of frequent PVCs and  the degree of aortic stenosis may be underestimated.  Visually, the aortic valve appears moderate-severely  stenotic. Consider additional imaging of the aortic valve  such as with additional TTE or TINO imaging if clinically  indicated.No aortic valve regurgitation seen.  3. Severely dilated left atrium.  LA volume index = 64  cc/m2.  4. Endocardium not well visualized; grossly normal left  ventricular systolic function. Septal motion consistent  with conduction defect. There is a false tendon in the LV  cavity (normal variant).  5. The right ventricle is not well visualized; right  ventricle appears enlarged with normal systolic function.  *** Compared with echocardiogram of 9/17/2017, results are  similar on today's study. Aortic valve gradients are higher  on this study.    < end of copied text >         *****A S S E S S M E N T   A N D   P L A N :  82M with HTN, Asthma, CVA, seizures, CAD who presents with mild chest discomfort, fatigue, diaphoresis, hypothermia, hypoglycemia, hypotension  off abx per ID  keep O=I  DAPT s/p recent PCI  BB if/when BP allows  echo noted  GI, pulm f/u noted  H/H improved after prbc  DCP    __________________________  POLLO Brady D.O.

## 2018-05-03 NOTE — PROGRESS NOTE ADULT - PROBLEM SELECTOR PROBLEM 4
Recurrent epistaxis
TSH elevation
Recurrent epistaxis
Recurrent epistaxis
TSH elevation
Recurrent epistaxis
Recurrent epistaxis

## 2018-05-04 ENCOUNTER — APPOINTMENT (OUTPATIENT)
Dept: PULMONOLOGY | Facility: CLINIC | Age: 83
End: 2018-05-04

## 2018-05-07 ENCOUNTER — APPOINTMENT (OUTPATIENT)
Dept: PULMONOLOGY | Facility: CLINIC | Age: 83
End: 2018-05-07

## 2018-05-07 ENCOUNTER — RX RENEWAL (OUTPATIENT)
Age: 83
End: 2018-05-07

## 2018-05-09 ENCOUNTER — APPOINTMENT (OUTPATIENT)
Dept: PULMONOLOGY | Facility: CLINIC | Age: 83
End: 2018-05-09

## 2018-05-11 ENCOUNTER — APPOINTMENT (OUTPATIENT)
Dept: PULMONOLOGY | Facility: CLINIC | Age: 83
End: 2018-05-11

## 2018-05-14 ENCOUNTER — APPOINTMENT (OUTPATIENT)
Dept: PULMONOLOGY | Facility: CLINIC | Age: 83
End: 2018-05-14

## 2018-05-16 ENCOUNTER — APPOINTMENT (OUTPATIENT)
Dept: PULMONOLOGY | Facility: CLINIC | Age: 83
End: 2018-05-16

## 2018-05-26 ENCOUNTER — INPATIENT (INPATIENT)
Facility: HOSPITAL | Age: 83
LOS: 2 days | Discharge: HOME | DRG: 948 | End: 2018-05-29
Attending: PSYCHIATRY & NEUROLOGY | Admitting: PSYCHIATRY & NEUROLOGY
Payer: MEDICARE

## 2018-05-26 VITALS
TEMPERATURE: 98 F | OXYGEN SATURATION: 94 % | HEART RATE: 73 BPM | DIASTOLIC BLOOD PRESSURE: 72 MMHG | RESPIRATION RATE: 21 BRPM | SYSTOLIC BLOOD PRESSURE: 129 MMHG

## 2018-05-26 DIAGNOSIS — Z96.649 PRESENCE OF UNSPECIFIED ARTIFICIAL HIP JOINT: Chronic | ICD-10-CM

## 2018-05-26 DIAGNOSIS — K91.89 OTHER POSTPROCEDURAL COMPLICATIONS AND DISORDERS OF DIGESTIVE SYSTEM: Chronic | ICD-10-CM

## 2018-05-26 DIAGNOSIS — Z90.89 ACQUIRED ABSENCE OF OTHER ORGANS: Chronic | ICD-10-CM

## 2018-05-26 DIAGNOSIS — Z95.5 PRESENCE OF CORONARY ANGIOPLASTY IMPLANT AND GRAFT: Chronic | ICD-10-CM

## 2018-05-26 DIAGNOSIS — M12.9 ARTHROPATHY, UNSPECIFIED: Chronic | ICD-10-CM

## 2018-05-26 DIAGNOSIS — Z98.890 OTHER SPECIFIED POSTPROCEDURAL STATES: Chronic | ICD-10-CM

## 2018-05-26 PROCEDURE — 99285 EMERGENCY DEPT VISIT HI MDM: CPT | Mod: 25,GC

## 2018-05-26 PROCEDURE — 93010 ELECTROCARDIOGRAM REPORT: CPT

## 2018-05-27 DIAGNOSIS — R53.1 WEAKNESS: ICD-10-CM

## 2018-05-27 LAB
ALBUMIN SERPL ELPH-MCNC: 3.4 G/DL — SIGNIFICANT CHANGE UP (ref 3.3–5)
ALP SERPL-CCNC: 230 U/L — HIGH (ref 40–120)
ALT FLD-CCNC: 37 U/L — SIGNIFICANT CHANGE UP (ref 10–45)
ANION GAP SERPL CALC-SCNC: 6 MMOL/L — SIGNIFICANT CHANGE UP (ref 5–17)
APTT BLD: 32.5 SEC — SIGNIFICANT CHANGE UP (ref 27.5–37.4)
AST SERPL-CCNC: 37 U/L — SIGNIFICANT CHANGE UP (ref 10–40)
BASOPHILS # BLD AUTO: 0 K/UL — SIGNIFICANT CHANGE UP (ref 0–0.2)
BASOPHILS NFR BLD AUTO: 0 % — SIGNIFICANT CHANGE UP (ref 0–2)
BILIRUB SERPL-MCNC: 0.3 MG/DL — SIGNIFICANT CHANGE UP (ref 0.2–1.2)
BUN SERPL-MCNC: 15 MG/DL — SIGNIFICANT CHANGE UP (ref 7–23)
CALCIUM SERPL-MCNC: 9.7 MG/DL — SIGNIFICANT CHANGE UP (ref 8.4–10.5)
CHLORIDE SERPL-SCNC: 105 MMOL/L — SIGNIFICANT CHANGE UP (ref 96–108)
CO2 SERPL-SCNC: 28 MMOL/L — SIGNIFICANT CHANGE UP (ref 22–31)
CREAT SERPL-MCNC: 0.64 MG/DL — SIGNIFICANT CHANGE UP (ref 0.5–1.3)
EOSINOPHIL # BLD AUTO: 0.2 K/UL — SIGNIFICANT CHANGE UP (ref 0–0.5)
EOSINOPHIL NFR BLD AUTO: 4.3 % — SIGNIFICANT CHANGE UP (ref 0–6)
ESTIMATED AVERAGE GLUCOSE: 88 MG/DL — SIGNIFICANT CHANGE UP (ref 68–114)
GAS PNL BLDV: SIGNIFICANT CHANGE UP
GLUCOSE SERPL-MCNC: 81 MG/DL — SIGNIFICANT CHANGE UP (ref 70–99)
HBA1C BLD-MCNC: 4.7 % — SIGNIFICANT CHANGE UP (ref 4–5.6)
HBA1C BLD-MCNC: 4.7 % — SIGNIFICANT CHANGE UP (ref 4–5.6)
HCT VFR BLD CALC: 34 % — LOW (ref 39–50)
HGB BLD-MCNC: 11 G/DL — LOW (ref 13–17)
INR BLD: 1.07 RATIO — SIGNIFICANT CHANGE UP (ref 0.88–1.16)
LYMPHOCYTES # BLD AUTO: 0.9 K/UL — LOW (ref 1–3.3)
LYMPHOCYTES # BLD AUTO: 16.1 % — SIGNIFICANT CHANGE UP (ref 13–44)
MCHC RBC-ENTMCNC: 31.9 PG — SIGNIFICANT CHANGE UP (ref 27–34)
MCHC RBC-ENTMCNC: 32.3 GM/DL — SIGNIFICANT CHANGE UP (ref 32–36)
MCV RBC AUTO: 98.8 FL — SIGNIFICANT CHANGE UP (ref 80–100)
MONOCYTES # BLD AUTO: 0.6 K/UL — SIGNIFICANT CHANGE UP (ref 0–0.9)
MONOCYTES NFR BLD AUTO: 10.9 % — SIGNIFICANT CHANGE UP (ref 2–14)
NEUTROPHILS # BLD AUTO: 3.7 K/UL — SIGNIFICANT CHANGE UP (ref 1.8–7.4)
NEUTROPHILS NFR BLD AUTO: 68.6 % — SIGNIFICANT CHANGE UP (ref 43–77)
PHENYTOIN FREE SERPL-MCNC: 34.6 UG/ML — CRITICAL HIGH (ref 10–20)
PLATELET # BLD AUTO: 161 K/UL — SIGNIFICANT CHANGE UP (ref 150–400)
POTASSIUM SERPL-MCNC: 4 MMOL/L — SIGNIFICANT CHANGE UP (ref 3.5–5.3)
POTASSIUM SERPL-SCNC: 4 MMOL/L — SIGNIFICANT CHANGE UP (ref 3.5–5.3)
PROT SERPL-MCNC: 7.1 G/DL — SIGNIFICANT CHANGE UP (ref 6–8.3)
PROTHROM AB SERPL-ACNC: 11.7 SEC — SIGNIFICANT CHANGE UP (ref 9.8–12.7)
RBC # BLD: 3.44 M/UL — LOW (ref 4.2–5.8)
RBC # FLD: 16.1 % — HIGH (ref 10.3–14.5)
SODIUM SERPL-SCNC: 139 MMOL/L — SIGNIFICANT CHANGE UP (ref 135–145)
WBC # BLD: 5.4 K/UL — SIGNIFICANT CHANGE UP (ref 3.8–10.5)
WBC # FLD AUTO: 5.4 K/UL — SIGNIFICANT CHANGE UP (ref 3.8–10.5)

## 2018-05-27 PROCEDURE — 70551 MRI BRAIN STEM W/O DYE: CPT | Mod: 26

## 2018-05-27 PROCEDURE — 70450 CT HEAD/BRAIN W/O DYE: CPT | Mod: 26

## 2018-05-27 PROCEDURE — 70548 MR ANGIOGRAPHY NECK W/DYE: CPT | Mod: 26

## 2018-05-27 RX ORDER — ALBUTEROL 90 UG/1
2 AEROSOL, METERED ORAL EVERY 6 HOURS
Qty: 0 | Refills: 0 | Status: DISCONTINUED | OUTPATIENT
Start: 2018-05-27 | End: 2018-05-29

## 2018-05-27 RX ORDER — CLOPIDOGREL BISULFATE 75 MG/1
75 TABLET, FILM COATED ORAL DAILY
Qty: 0 | Refills: 0 | Status: DISCONTINUED | OUTPATIENT
Start: 2018-05-27 | End: 2018-05-29

## 2018-05-27 RX ORDER — PREGABALIN 225 MG/1
500 CAPSULE ORAL DAILY
Qty: 0 | Refills: 0 | Status: DISCONTINUED | OUTPATIENT
Start: 2018-05-27 | End: 2018-05-29

## 2018-05-27 RX ORDER — ALBUTEROL 90 UG/1
2 AEROSOL, METERED ORAL
Qty: 0 | Refills: 0 | COMMUNITY

## 2018-05-27 RX ORDER — TIOTROPIUM BROMIDE 18 UG/1
1 CAPSULE ORAL; RESPIRATORY (INHALATION) DAILY
Qty: 0 | Refills: 0 | Status: DISCONTINUED | OUTPATIENT
Start: 2018-05-27 | End: 2018-05-29

## 2018-05-27 RX ORDER — MONTELUKAST 4 MG/1
10 TABLET, CHEWABLE ORAL DAILY
Qty: 0 | Refills: 0 | Status: DISCONTINUED | OUTPATIENT
Start: 2018-05-27 | End: 2018-05-29

## 2018-05-27 RX ORDER — SODIUM CHLORIDE 9 MG/ML
1000 INJECTION INTRAMUSCULAR; INTRAVENOUS; SUBCUTANEOUS
Qty: 0 | Refills: 0 | Status: DISCONTINUED | OUTPATIENT
Start: 2018-05-27 | End: 2018-05-27

## 2018-05-27 RX ORDER — FOLIC ACID 0.8 MG
1 TABLET ORAL DAILY
Qty: 0 | Refills: 0 | Status: DISCONTINUED | OUTPATIENT
Start: 2018-05-27 | End: 2018-05-29

## 2018-05-27 RX ORDER — PREGABALIN 225 MG/1
1 CAPSULE ORAL
Qty: 0 | Refills: 0 | COMMUNITY

## 2018-05-27 RX ORDER — PYRIDOXINE HCL (VITAMIN B6) 100 MG
1 TABLET ORAL
Qty: 0 | Refills: 0 | COMMUNITY

## 2018-05-27 RX ORDER — ENOXAPARIN SODIUM 100 MG/ML
40 INJECTION SUBCUTANEOUS EVERY 24 HOURS
Qty: 0 | Refills: 0 | Status: DISCONTINUED | OUTPATIENT
Start: 2018-05-27 | End: 2018-05-29

## 2018-05-27 RX ORDER — PYRIDOXINE HCL (VITAMIN B6) 100 MG
100 TABLET ORAL DAILY
Qty: 0 | Refills: 0 | Status: DISCONTINUED | OUTPATIENT
Start: 2018-05-27 | End: 2018-05-29

## 2018-05-27 RX ORDER — ATORVASTATIN CALCIUM 80 MG/1
80 TABLET, FILM COATED ORAL AT BEDTIME
Qty: 0 | Refills: 0 | Status: DISCONTINUED | OUTPATIENT
Start: 2018-05-27 | End: 2018-05-29

## 2018-05-27 RX ORDER — ASPIRIN/CALCIUM CARB/MAGNESIUM 324 MG
81 TABLET ORAL DAILY
Qty: 0 | Refills: 0 | Status: DISCONTINUED | OUTPATIENT
Start: 2018-05-27 | End: 2018-05-29

## 2018-05-27 RX ORDER — MIDODRINE HYDROCHLORIDE 2.5 MG/1
10 TABLET ORAL THREE TIMES A DAY
Qty: 0 | Refills: 0 | Status: DISCONTINUED | OUTPATIENT
Start: 2018-05-27 | End: 2018-05-29

## 2018-05-27 RX ORDER — BUDESONIDE AND FORMOTEROL FUMARATE DIHYDRATE 160; 4.5 UG/1; UG/1
2 AEROSOL RESPIRATORY (INHALATION)
Qty: 0 | Refills: 0 | Status: DISCONTINUED | OUTPATIENT
Start: 2018-05-27 | End: 2018-05-29

## 2018-05-27 RX ORDER — FINASTERIDE 5 MG/1
5 TABLET, FILM COATED ORAL DAILY
Qty: 0 | Refills: 0 | Status: DISCONTINUED | OUTPATIENT
Start: 2018-05-27 | End: 2018-05-29

## 2018-05-27 RX ORDER — LEVOTHYROXINE SODIUM 125 MCG
25 TABLET ORAL DAILY
Qty: 0 | Refills: 0 | Status: DISCONTINUED | OUTPATIENT
Start: 2018-05-27 | End: 2018-05-29

## 2018-05-27 RX ADMIN — CLOPIDOGREL BISULFATE 75 MILLIGRAM(S): 75 TABLET, FILM COATED ORAL at 13:16

## 2018-05-27 RX ADMIN — Medication 81 MILLIGRAM(S): at 13:16

## 2018-05-27 RX ADMIN — ENOXAPARIN SODIUM 40 MILLIGRAM(S): 100 INJECTION SUBCUTANEOUS at 22:00

## 2018-05-27 RX ADMIN — MIDODRINE HYDROCHLORIDE 10 MILLIGRAM(S): 2.5 TABLET ORAL at 22:00

## 2018-05-27 RX ADMIN — ATORVASTATIN CALCIUM 80 MILLIGRAM(S): 80 TABLET, FILM COATED ORAL at 22:00

## 2018-05-27 RX ADMIN — FINASTERIDE 5 MILLIGRAM(S): 5 TABLET, FILM COATED ORAL at 13:16

## 2018-05-27 RX ADMIN — Medication 10 MILLIGRAM(S): at 13:16

## 2018-05-27 RX ADMIN — Medication 1 MILLIGRAM(S): at 13:16

## 2018-05-27 RX ADMIN — BUDESONIDE AND FORMOTEROL FUMARATE DIHYDRATE 2 PUFF(S): 160; 4.5 AEROSOL RESPIRATORY (INHALATION) at 20:14

## 2018-05-27 NOTE — H&P ADULT - NSHPLABSRESULTS_GEN_ALL_CORE
< from: CT Head No Cont (05.27.18 @ 00:53) >    No acute intracranial hemorrhage, mass effect, or evidence of acute   vascular territorial infarction.    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    If clinical symptoms persist or worsen, more sensitive evaluation with   brain MRI may be obtained, if no contraindications exist.        < end of copied text >

## 2018-05-27 NOTE — ED PROVIDER NOTE - ATTENDING CONTRIBUTION TO CARE
attending Preethi: 82yM h/o CVA, HTN, HLD, esophageal cancer, p/w 1 week of double vision, drooling from right side of mouth, slouching to the right, and R sided weakness. Also with new difficulty ambulating. Concern for stroke. Will obtain labs, CTH, neuro evaluation and admission.

## 2018-05-27 NOTE — ED PROVIDER NOTE - PMH
Anxiety    Asthma    BCC (basal cell carcinoma)  skin  BPH (benign prostatic hyperplasia)    Cerebrovascular accident (CVA)    Chronic allergic rhinitis    Coronary artery disease  s/p 3 stents on June 30, 2017 at [place] Dr. Lavelle Reid  Esophagus cancer    Former smoker, stopped smoking in distant past    HTN (hypertension)    Hyperlipidemia    OA (osteoarthritis) of knee  right  Rheumatoid arthritis    Seizure disorder  1 episode approximately 15 yrs ago

## 2018-05-27 NOTE — ED ADULT NURSE NOTE - OBJECTIVE STATEMENT
82y male arrived to ED complaining of double vision. Patient PMHx CVA, HTN, HLD, asthma, esophagus CA, CAD, HLD, OA, BCC, BPH. Patient had neuro issues at baseline and follows up outpatient. Patient reports that double vision has been ongoing x1 week - difficult to see the tv. Patient and family also endorse drooling from right side of mouth, right sided favoring, right sided upper extremity weakness. Patient VS stable stable, A&Ox3. Patient denies SOB, CP, n/v/d, ab pain, headache. Patient has difficulty ambulating. Patient is neurologically intact.

## 2018-05-27 NOTE — H&P ADULT - NSHPPHYSICALEXAM_GEN_ALL_CORE
Neuro: AAOx3; knows age, month, obeys commands, no dysarthria; calculations intact, fluent names, repeats     CN: PERRL, mild dysconjugate gaze right visual field cut normal gaze preference, subtle r Nl flattening    Motor: Left sided 5-, Right 4/5 RLe externally rotated RLE drift    Sensory: Intact to LT and PP no extinction    Coordination: RUe ataxia

## 2018-05-27 NOTE — ED PROVIDER NOTE - NEUROLOGICAL LEVEL OF CONSCIOUSNESS
subtle r Nl flattening, Left sided 5-, Right 4/5 RLe externally rotated RLE drift, RUe ataxia/alert/follows commands

## 2018-05-27 NOTE — CONSULT NOTE ADULT - ASSESSMENT
This is an 82 year old male with double vision and weakness.   Of concern is that there could be a thrombolic stroke.      MRI brain/ Mra brain and neck      Asprin and plavix     statin     echo if mri positive

## 2018-05-27 NOTE — H&P ADULT - ASSESSMENT
84 y/o M former smoker w/ hx of HTN, CVA, Seizures, RA, CAD s/p 6 stents, Esophageal CA s/p chemo/RT and esophagogastrectomy, wheelchair bound presents with a few days double vision, dropping things from his right hand and feeliong like he's moving from side to side. Pe right field cut, subtle R Nl flattening R side weaker than  left RUE ataxia out of proprtion to weakness CTh chronic right parietal and left occipital infarcts NIHSS 6, preMRS    MRI, MRA H/N  Aspirin for secondary stroke prevention at this time. Atorvastatin 80, titrate the dose according to LDL.   TTE with bubble study and telemetry to look for a cardiac source of embolism.   DVT prophylaxis, Neurochecks  Permissive HTN  HbA1C and LDL.   PT/OT/Speech and swallow/safety eval. 84 y/o M former smoker w/ hx of HTN, CVA, Seizures, RA, CAD s/p 6 stents, Esophageal CA s/p chemo/RT and esophagogastrectomy, wheelchair bound presents with a few days double vision, dropping things from his right hand and feeliong like he's moving from side to side. Pe right field cut, subtle R Nl flattening R side weaker than  left RUE ataxia out of proprtion to weakness CTh chronic right parietal and left occipital infarcts NIHSS 6, preMRS 3    MRI, MRA H/N  Aspirin for secondary stroke prevention at this time. Atorvastatin 80, titrate the dose according to LDL.   TTE with bubble study and telemetry to look for a cardiac source of embolism.   DVT prophylaxis, Neurochecks  Permissive HTN  HbA1C and LDL.   PT/OT/Speech and swallow/safety eval.

## 2018-05-27 NOTE — PATIENT PROFILE ADULT. - FALL HARM RISK
other/bones(Osteoporosis,prev fx,steroid use,metastatic bone ca/post fall/coagulation(Bleeding disorder R/T clinical cond/anti-coags)

## 2018-05-27 NOTE — ED PROVIDER NOTE - PROGRESS NOTE DETAILS
attending Preethi: discussed with Dr. Schoenberg who is covering for Dr. Velasquez. As per Dr. Schoenberg ptt to be admitted to Dr. Velasquez's service.

## 2018-05-27 NOTE — CONSULT NOTE ADULT - SUBJECTIVE AND OBJECTIVE BOX
Neurology consult    MEJIA [id number]    HPI:  This ia an 82 year old male who presents with double vision.  He reports for the past few days it has been intermittent.  ER staff thought he was weak on the right.  He has not been able to walk since he had an infection.    PAST MEDICAL HISTORY  Coronary artery disease  OA (osteoarthritis) of knee  Former smoker, stopped smoking in distant past  Former smoker  Rheumatoid arthritis  Seizure disorder  Asthma  Hyperlipidemia  BPH (benign prostatic hyperplasia)  HTN (hypertension)  Esophagus cancer  Chronic allergic rhinitis  BCC (basal cell carcinoma)  Cerebrovascular accident (CVA)  Anxiety      PAST SURGICAL HISTORY  Anastomotic leak following esophagectomy  History of tonsillectomy  H/O heart artery stent  Knee arthropathy  History of total hip replacement  History of hernia repair        MEDICATIONS           FAMILY HISTORY  No history of dementia, strokes, or seizures   FAMILY HISTORY:  Family history of heart attack  Family history of pulmonary embolism: father @ 49 yr old  FH: CAD (coronary artery disease) (Sibling)      SOCIAL HISTORY -- No history of tobacco or alcohol use     Allergies    penicillin (Rash)    Intolerances            Vital Signs Last 24 Hrs  T(C): 36.3 (27 May 2018 07:34), Max: 36.4 (26 May 2018 21:05)  T(F): 97.3 (27 May 2018 07:34), Max: 97.6 (26 May 2018 21:05)  HR: 75 (27 May 2018 07:34) (67 - 75)  BP: 120/60 (27 May 2018 07:34) (119/60 - 140/73)  BP(mean): --  RR: 18 (27 May 2018 07:34) (18 - 21)  SpO2: 99% (27 May 2018 07:34) (94% - 100%)      REVIEW OF SYSTEMS:    Constitutional: No fever, chills, fatigue, weakness  Eyes: no eye pain, visual disturbances, or discharge  ENT:  No difficulty hearing, tinnitus, vertigo; No sinus or throat pain  Neck: No pain or stiffness  Respiratory: No cough, dyspnea, wheezing   Cardiovascular: No chest pain, palpitations,   Gastrointestinal: No abdominal or epigastric pain. No nausea, vomiting  No diarrhea or constipation.   Genitourinary: No dysuria, frequency, hematuria or incontinence  Neurological: No headaches, lightheadedness, vertigo, numbness or tremors  Psychiatric: No depression, anxiety, mood swings or difficulty sleeping  Musculoskeletal: No joint pain or swelling; No muscle, back or extremity pain  Skin: No itching, burning, rashes or lesions   Lymph Nodes: No enlarged glands  Endocrine: No heat or cold intolerance; No hair loss, No h/o diabetes or thyroid dysfunction  Allergy and Immunologic: No hives or eczema    On Neurological Examination:    Mental Status - Patient is alert, awake, oriented X3. .     Follows commands well and able to answer questions appropriately. Mood and affect  normal  Follow simple commands  Follow complex commands  Does  follow commands  Speech -   Fluent    Dysarthria  Aphasia                              Cranial Nerves - Pupils  equal and reactive to light,  no edema   extraocular eye movements intact,facial symmetry, hearing intact,   palate symmetric. tongue midline     Motor Exam -   Right upper 5/5 throughout  Left upper 5/5 throughout  Right lower 5/5 throughout  Left lower 5/5 throughout  Muscle tone - is normal all over. No asymmetry is seen.      Sensory    Bilateral intact to light touch    Gait -  normal,  not ataxia ,finger to nose intact     GENERAL Exam:     Nontoxic , No Acute Distress   	  HEENT:  normocephalic, atraumatic  		  LUNGS:	Clear bilaterally  No Wheeze    	  HEART:	 Normal S1S2   No murmur RRR        	  GI/ ABDOMEN:  Soft  Non tender    EXTREMITIES:   No Edema  No Clubbing  No Cyanosis No Edema    MUSCULOSKELETAL: Normal Range of Motion  	   SKIN:      Normal   No Ecchymosis     VASCULAR: no carotid brui          LABS:  CBC Full  -  ( 27 May 2018 00:44 )  WBC Count : 5.4 K/uL  Hemoglobin : 11.0 g/dL  Hematocrit : 34.0 %  Platelet Count - Automated : 161 K/uL  Mean Cell Volume : 98.8 fl  Mean Cell Hemoglobin : 31.9 pg  Mean Cell Hemoglobin Concentration : 32.3 gm/dL  Auto Neutrophil # : 3.7 K/uL  Auto Lymphocyte # : 0.9 K/uL  Auto Monocyte # : 0.6 K/uL  Auto Eosinophil # : 0.2 K/uL  Auto Basophil # : 0.0 K/uL  Auto Neutrophil % : 68.6 %  Auto Lymphocyte % : 16.1 %  Auto Monocyte % : 10.9 %  Auto Eosinophil % : 4.3 %  Auto Basophil % : 0.0 %      05-27    139  |  105  |  15  ----------------------------<  81  4.0   |  28  |  0.64    Ca    9.7      27 May 2018 00:44    TPro  7.1  /  Alb  3.4  /  TBili  0.3  /  DBili  x   /  AST  37  /  ALT  37  /  AlkPhos  230<H>  05-27    Hemoglobin A1C:     LIVER FUNCTIONS - ( 27 May 2018 00:44 )  Alb: 3.4 g/dL / Pro: 7.1 g/dL / ALK PHOS: 230 U/L / ALT: 37 U/L / AST: 37 U/L / GGT: x           Vitamin B12   PT/INR - ( 27 May 2018 00:44 )   PT: 11.7 sec;   INR: 1.07 ratio         PTT - ( 27 May 2018 00:44 )  PTT:32.5 sec      RADIOLOGY  < from: CT Head No Cont (05.27.18 @ 00:53) >  EXAM:  CT BRAIN                            PROCEDURE DATE:  05/27/2018            INTERPRETATION:  CLINICAL INFORMATION: Right-sided motor deficit and   drooling.    TECHNIQUE: Noncontrast axial CT images were acquired through the head.   Two-dimensional sagittal and coronal reformats were generated.     COMPARISON STUDY: CT head dated 9/29/2009.    FINDINGS:     Chronic right parietal and left occipital infarcts. Mild patchy   hypodensities within the periventricular and subcortical white matter,   although nonspecific, likely reflect chronic microvascular disease. No   acute intracranial hemorrhage, mass effect, midline shift, extra-axial   collection, hydrocephalus, or evidence of acute vascular territorial   infarction.    Patchy paranasal sinus mucosal thickening. Clear mastoid air cells.   Visualized osseous structures are intact.    IMPRESSION:     No acute intracranial hemorrhage, mass effect, or evidence of acute   vascular territorial infarction.    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    If clinical symptoms persist or worsen, more sensitive evaluation with   brain MRI may be obtained, if no contraindications exist.                AUNDREA HERNANDEZ M.D., RADIOLOGY RESIDENT  This document has been electronically signed.  PATRICK PRINCE M.D., ATTENDING RADIOLOGIST  This document has been electronically signed. May 27 2018  2:40AM        < end of copied text >    EKG      Impression :     This is a   year old               schoenberg  H and P  Neurology consult    MEJIAJESS [id number]    HPI:  This ia an 82 year old male who presents with double vision.  He reports for the past few days it has been intermittent.  ER staff thought he was weak on the right.  He has not been able to walk since he had an infection.    PAST MEDICAL HISTORY  Coronary artery disease  OA (osteoarthritis) of knee  Former smoker, stopped smoking in distant past  Former smoker  Rheumatoid arthritis  Seizure disorder  Asthma  Hyperlipidemia  BPH (benign prostatic hyperplasia)  HTN (hypertension)  Esophagus cancer  Chronic allergic rhinitis  BCC (basal cell carcinoma)  Cerebrovascular accident (CVA)  Anxiety      PAST SURGICAL HISTORY  Anastomotic leak following esophagectomy  History of tonsillectomy  H/O heart artery stent  Knee arthropathy  History of total hip replacement  History of hernia repair        MEDICATIONS           FAMILY HISTORY  No history of dementia, strokes, or seizures   FAMILY HISTORY:  Family history of heart attack  Family history of pulmonary embolism: father @ 49 yr old  FH: CAD (coronary artery disease) (Sibling)      SOCIAL HISTORY -- No history of tobacco or alcohol use     Allergies    penicillin (Rash)    Intolerances            Vital Signs Last 24 Hrs  T(C): 36.3 (27 May 2018 07:34), Max: 36.4 (26 May 2018 21:05)  T(F): 97.3 (27 May 2018 07:34), Max: 97.6 (26 May 2018 21:05)  HR: 75 (27 May 2018 07:34) (67 - 75)  BP: 120/60 (27 May 2018 07:34) (119/60 - 140/73)  BP(mean): --  RR: 18 (27 May 2018 07:34) (18 - 21)  SpO2: 99% (27 May 2018 07:34) (94% - 100%)      REVIEW OF SYSTEMS:    Constitutional: No fever, chills, fatigue, weakness  Eyes: no eye pain, visual disturbances, or discharge  ENT:  No difficulty hearing, tinnitus, vertigo; No sinus or throat pain  Neck: No pain or stiffness  Respiratory: No cough, dyspnea, wheezing   Cardiovascular: No chest pain, palpitations,   Gastrointestinal: No abdominal or epigastric pain. No nausea, vomiting  No diarrhea or constipation.   Genitourinary: No dysuria, frequency, hematuria or incontinence  Neurological: No headaches, lightheadedness, vertigo, numbness or tremors  Psychiatric: No depression, anxiety, mood swings or difficulty sleeping  Musculoskeletal: No joint pain or swelling; No muscle, back or extremity pain  Skin: No itching, burning, rashes or lesions   Lymph Nodes: No enlarged glands  Endocrine: No heat or cold intolerance; No hair loss, No h/o diabetes or thyroid dysfunction  Allergy and Immunologic: No hives or eczema    On Neurological Examination:    Mental Status - Patient is alert, awake, oriented X3. .     Follows commands well and able to answer questions appropriately. Mood and affect  normal  Follow simple commands  Follow complex commands  Does  follow commands  Speech -   Fluent    Dysarthria  Aphasia                              Cranial Nerves - Pupils  equal and reactive to light,  no edema   extraocular eye movements intact,facial symmetry, hearing intact,   palate symmetric. tongue midline     Motor Exam -   Right upper 4/5 throughout  Left upper 4/5 throughout  Right lower 4/5 throughout  Left lower 4/5 throughout  Muscle tone - is normal all over. No asymmetry is seen.      Sensory    Bilateral intact to light touch    Gait - not able to walk	    GENERAL Exam:     Nontoxic , No Acute Distress   	  HEENT:  normocephalic, atraumatic  		  LUNGS:	Clear bilaterally  No Wheeze    	  HEART:	 Normal S1S2   No murmur RRR        	  GI/ ABDOMEN:  Soft  Non tender    EXTREMITIES:   No Edema  No Clubbing  No Cyanosis No Edema    MUSCULOSKELETAL: Normal Range of Motion  	   SKIN:      Normal   No Ecchymosis     VASCULAR: no carotid brui          LABS:  CBC Full  -  ( 27 May 2018 00:44 )  WBC Count : 5.4 K/uL  Hemoglobin : 11.0 g/dL  Hematocrit : 34.0 %  Platelet Count - Automated : 161 K/uL  Mean Cell Volume : 98.8 fl  Mean Cell Hemoglobin : 31.9 pg  Mean Cell Hemoglobin Concentration : 32.3 gm/dL  Auto Neutrophil # : 3.7 K/uL  Auto Lymphocyte # : 0.9 K/uL  Auto Monocyte # : 0.6 K/uL  Auto Eosinophil # : 0.2 K/uL  Auto Basophil # : 0.0 K/uL  Auto Neutrophil % : 68.6 %  Auto Lymphocyte % : 16.1 %  Auto Monocyte % : 10.9 %  Auto Eosinophil % : 4.3 %  Auto Basophil % : 0.0 %      05-27    139  |  105  |  15  ----------------------------<  81  4.0   |  28  |  0.64    Ca    9.7      27 May 2018 00:44    TPro  7.1  /  Alb  3.4  /  TBili  0.3  /  DBili  x   /  AST  37  /  ALT  37  /  AlkPhos  230<H>  05-27    Hemoglobin A1C:     LIVER FUNCTIONS - ( 27 May 2018 00:44 )  Alb: 3.4 g/dL / Pro: 7.1 g/dL / ALK PHOS: 230 U/L / ALT: 37 U/L / AST: 37 U/L / GGT: x           Vitamin B12   PT/INR - ( 27 May 2018 00:44 )   PT: 11.7 sec;   INR: 1.07 ratio         PTT - ( 27 May 2018 00:44 )  PTT:32.5 sec      RADIOLOGY  < from: CT Head No Cont (05.27.18 @ 00:53) >  EXAM:  CT BRAIN                            PROCEDURE DATE:  05/27/2018            INTERPRETATION:  CLINICAL INFORMATION: Right-sided motor deficit and   drooling.    TECHNIQUE: Noncontrast axial CT images were acquired through the head.   Two-dimensional sagittal and coronal reformats were generated.     COMPARISON STUDY: CT head dated 9/29/2009.    FINDINGS:     Chronic right parietal and left occipital infarcts. Mild patchy   hypodensities within the periventricular and subcortical white matter,   although nonspecific, likely reflect chronic microvascular disease. No   acute intracranial hemorrhage, mass effect, midline shift, extra-axial   collection, hydrocephalus, or evidence of acute vascular territorial   infarction.    Patchy paranasal sinus mucosal thickening. Clear mastoid air cells.   Visualized osseous structures are intact.    IMPRESSION:     No acute intracranial hemorrhage, mass effect, or evidence of acute   vascular territorial infarction.    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    If clinical symptoms persist or worsen, more sensitive evaluation with   brain MRI may be obtained, if no contraindications exist.                AUNDREA HERNANDEZ M.D., RADIOLOGY RESIDENT  This document has been electronically signed.  PATRICK PRINCE M.D., ATTENDING RADIOLOGIST  This document has been electronically signed. May 27 2018  2:40AM        < end of copied text >    EKG      Impression :     This is a   year old               schoenberg

## 2018-05-27 NOTE — ED PROVIDER NOTE - OBJECTIVE STATEMENT
Porsche Lloyd M.D: 82M multiple medical problems including cva, htn, hld, esophageal cancer, follows with Dr Velasquez for neuro issues, p/w 1 week of double vision, drooling from right side of mouth, slouching to the right, dropping food when trying to feed himself with his right hand.  has been unable to ambulate as well. denies headache, f/c, abd pain, n/v/c/d

## 2018-05-27 NOTE — H&P ADULT - PMH
Anxiety    Asthma    BCC (basal cell carcinoma)  skin  BPH (benign prostatic hyperplasia)    Cerebrovascular accident (CVA)    Chronic allergic rhinitis    Coronary artery disease  s/p 3 stents on June 30, 2017 at [facility]. Lavelle Reid  Esophagus cancer    Former smoker, stopped smoking in distant past    HTN (hypertension)    Hyperlipidemia    OA (osteoarthritis) of knee  right  Rheumatoid arthritis    Seizure disorder  1 episode approximately 15 yrs ago

## 2018-05-27 NOTE — H&P ADULT - HISTORY OF PRESENT ILLNESS
82 y/o M former smoker w/ hx of HTN, CVA, Seizures, RA, CAD s/p 6 stents, Esophageal CA s/p chemo/RT and esophagogastrectomy, wheelchair bound presents with a few days double vision, dropping things from his right hand and feeliong like he's moving from side to side. Residual R visual field cut. BL needs assistance with ADLs

## 2018-05-28 LAB
APPEARANCE UR: CLEAR — SIGNIFICANT CHANGE UP
BILIRUB UR-MCNC: NEGATIVE — SIGNIFICANT CHANGE UP
CHOLEST SERPL-MCNC: 174 MG/DL — SIGNIFICANT CHANGE UP (ref 10–199)
COLOR SPEC: YELLOW — SIGNIFICANT CHANGE UP
DIFF PNL FLD: NEGATIVE — SIGNIFICANT CHANGE UP
GLUCOSE UR QL: NEGATIVE — SIGNIFICANT CHANGE UP
HDLC SERPL-MCNC: 75 MG/DL — SIGNIFICANT CHANGE UP (ref 40–125)
KETONES UR-MCNC: ABNORMAL
LEUKOCYTE ESTERASE UR-ACNC: NEGATIVE — SIGNIFICANT CHANGE UP
LIPID PNL WITH DIRECT LDL SERPL: 75 MG/DL — SIGNIFICANT CHANGE UP
NITRITE UR-MCNC: NEGATIVE — SIGNIFICANT CHANGE UP
PH UR: 7 — SIGNIFICANT CHANGE UP (ref 5–8)
PHENYTOIN FREE SERPL-MCNC: 32.4 UG/ML — CRITICAL HIGH (ref 10–20)
PROT UR-MCNC: NEGATIVE — SIGNIFICANT CHANGE UP
SP GR SPEC: 1.02 — SIGNIFICANT CHANGE UP (ref 1.01–1.02)
TOTAL CHOLESTEROL/HDL RATIO MEASUREMENT: 2.3 RATIO — LOW (ref 3.4–9.6)
TRIGL SERPL-MCNC: 120 MG/DL — SIGNIFICANT CHANGE UP (ref 10–149)
UROBILINOGEN FLD QL: NEGATIVE — SIGNIFICANT CHANGE UP

## 2018-05-28 PROCEDURE — 71045 X-RAY EXAM CHEST 1 VIEW: CPT | Mod: 26

## 2018-05-28 RX ADMIN — Medication 25 MICROGRAM(S): at 09:07

## 2018-05-28 RX ADMIN — MIDODRINE HYDROCHLORIDE 10 MILLIGRAM(S): 2.5 TABLET ORAL at 21:08

## 2018-05-28 RX ADMIN — Medication 100 MILLIGRAM(S): at 11:23

## 2018-05-28 RX ADMIN — BUDESONIDE AND FORMOTEROL FUMARATE DIHYDRATE 2 PUFF(S): 160; 4.5 AEROSOL RESPIRATORY (INHALATION) at 18:32

## 2018-05-28 RX ADMIN — ATORVASTATIN CALCIUM 80 MILLIGRAM(S): 80 TABLET, FILM COATED ORAL at 21:08

## 2018-05-28 RX ADMIN — FINASTERIDE 5 MILLIGRAM(S): 5 TABLET, FILM COATED ORAL at 11:22

## 2018-05-28 RX ADMIN — ENOXAPARIN SODIUM 40 MILLIGRAM(S): 100 INJECTION SUBCUTANEOUS at 21:08

## 2018-05-28 RX ADMIN — CLOPIDOGREL BISULFATE 75 MILLIGRAM(S): 75 TABLET, FILM COATED ORAL at 11:22

## 2018-05-28 RX ADMIN — Medication 10 MILLIGRAM(S): at 09:08

## 2018-05-28 RX ADMIN — TIOTROPIUM BROMIDE 1 CAPSULE(S): 18 CAPSULE ORAL; RESPIRATORY (INHALATION) at 11:23

## 2018-05-28 RX ADMIN — Medication 81 MILLIGRAM(S): at 11:22

## 2018-05-28 RX ADMIN — MIDODRINE HYDROCHLORIDE 10 MILLIGRAM(S): 2.5 TABLET ORAL at 14:19

## 2018-05-28 RX ADMIN — BUDESONIDE AND FORMOTEROL FUMARATE DIHYDRATE 2 PUFF(S): 160; 4.5 AEROSOL RESPIRATORY (INHALATION) at 06:00

## 2018-05-28 RX ADMIN — MIDODRINE HYDROCHLORIDE 10 MILLIGRAM(S): 2.5 TABLET ORAL at 09:07

## 2018-05-28 RX ADMIN — PREGABALIN 500 MICROGRAM(S): 225 CAPSULE ORAL at 11:22

## 2018-05-28 RX ADMIN — Medication 1 MILLIGRAM(S): at 11:23

## 2018-05-28 RX ADMIN — MONTELUKAST 10 MILLIGRAM(S): 4 TABLET, CHEWABLE ORAL at 11:23

## 2018-05-28 NOTE — PROGRESS NOTE ADULT - ASSESSMENT
This is a male with double vision and weakness.  MRI show embolic strokes in RMCA area  with calcarine branch of pca closure      monitor heart      echo     aspirin plavix statin     avoid hypotension This is a male with double vision and weakness.  MRI  negative       most likely du to dilantin toxicity     d/c home This is a male with double vision and weakness.  MRI  negative       most likely due to dilantin toxicity.  Dilantin toxicity does cause double vision.  Today no double vision with holding dilantin     d/c home when  sees patient

## 2018-05-28 NOTE — SWALLOW BEDSIDE ASSESSMENT ADULT - SLP GENERAL OBSERVATIONS
Pt awake, alert and oriented with wife, daughters and granddaughter at bedside. Pt reports productive cough since time of surgery and occasional baseline cough observed prior to PO intake; however, coughing not noted during PO trials.  Pt reports h/o episodes of epistaxis (Pt seen by ENT on previous admission); Pt and wife report that w/u during last admission revealed that "hemoptysis" was related to chronic nosebleeds, not to pulmonary or esophageal etiology.

## 2018-05-28 NOTE — PROGRESS NOTE ADULT - SUBJECTIVE AND OBJECTIVE BOX
Subjective:Interval History - No events overnight. Double vision improved.     Objective:   Vital Signs Last 24 Hrs  T(C): 36.3 (28 May 2018 07:50), Max: 36.4 (28 May 2018 05:37)  T(F): 97.4 (28 May 2018 07:50), Max: 97.5 (28 May 2018 05:37)  HR: 71 (28 May 2018 07:50) (66 - 74)  BP: 138/73 (28 May 2018 07:50) (129/74 - 149/67)  BP(mean): --  RR: 16 (28 May 2018 07:50) (16 - 20)  SpO2: 94% (28 May 2018 07:50) (94% - 96%)    General Exam:   General appearance: No acute distress                   Neurological Exam:    Neuro: AAOx3; knows age, month, obeys commands, no dysarthria; calculations intact, fluent names, repeats  CN: PERRL, mild dysconjugate gaze right visual field cut normal gaze preference, subtle r Nl flattening  Motor: Left sided 5-, Right 4/5 RLe externally rotated RLE drift  Sensory: Intact to LT and PP no extinction  Coordination: RUe ataxia    Other:    05-27    139  |  105  |  15  ----------------------------<  81  4.0   |  28  |  0.64    Ca    9.7      27 May 2018 00:44    TPro  7.1  /  Alb  3.4  /  TBili  0.3  /  DBili  x   /  AST  37  /  ALT  37  /  AlkPhos  230<H>  05-27    LIVER FUNCTIONS - ( 27 May 2018 00:44 )  Alb: 3.4 g/dL / Pro: 7.1 g/dL / ALK PHOS: 230 U/L / ALT: 37 U/L / AST: 37 U/L / GGT: x                                 11.0   5.4   )-----------( 161      ( 27 May 2018 00:44 )             34.0     Radiology     MRI brain : no acute stroke       MEDICATIONS  (STANDING):  aspirin enteric coated 81 milliGRAM(s) Oral daily  atorvastatin 80 milliGRAM(s) Oral at bedtime  buDESOnide  80 MICROgram(s)/formoterol 4.5 MICROgram(s) Inhaler 2 Puff(s) Inhalation two times a day  clopidogrel Tablet 75 milliGRAM(s) Oral daily  cyanocobalamin 500 MICROGram(s) Oral daily  enoxaparin Injectable 40 milliGRAM(s) SubCutaneous every 24 hours  finasteride 5 milliGRAM(s) Oral daily  folic acid 1 milliGRAM(s) Oral daily  levothyroxine 25 MICROGram(s) Oral daily  midodrine 10 milliGRAM(s) Oral three times a day  montelukast 10 milliGRAM(s) Oral daily  predniSONE   Tablet 10 milliGRAM(s) Oral daily  pyridoxine 100 milliGRAM(s) Oral daily  tiotropium 18 MICROgram(s) Capsule 1 Capsule(s) Inhalation daily    MEDICATIONS  (PRN):  ALBUTerol    90 MICROgram(s) HFA Inhaler 2 Puff(s) Inhalation every 6 hours PRN Shortness of Breath and/or Wheezing

## 2018-05-28 NOTE — SWALLOW BEDSIDE ASSESSMENT ADULT - COMMENTS
5/27: passed swallow screen  5/28: Pt with cough with justice red blood production; Pt in NAD states "it's a lot less than it used to be."  Per GI: s/p gastric pullup.  esophagectomy. no dysphagia per patient, agree eat slow.  speech and swallow eval.  pt with ? tia.  defer neuro.  there was an issue with fobt positive and anemia last admission, but no contraindication to a/c if needed gi perspective. 5/27: passed swallow screen  5/28: Pt with cough with justice red blood production; Pt in NAD states "it's a lot less than it used to be."  Per GI: s/p gastric pullup.  esophagectomy. no dysphagia per patient, agree eat slow.  speech and swallow eval.  pt with ? tia.  defer neuro.  there was an issue with fobt positive and anemia last admission, but no contraindication to a/c if needed gi perspective.    ENT 4/26/17 (last admission): FOE/Laryngoscope: large septal perforation, no bleeding in nasal pharynx or Oral pharynx.  No foreign body or pooling of secretions.  No glottic/ supraglottic swelling.  Vocal cords mobile in intact b/l.  Airway patent. IMPRESSIONS: Epistaxis, recurrent. Rx: Nasal saline, humidification if O2 required.  Swallow Hx: Bedside swallow eval 4/24/17: Pt presents with overtly functional oropharyngeal swallowing skills.  No signs or symptoms of laryngeal penetration/aspiration evident with any consistency presented.  Pharyngeal swallow judged to be timely with adequate laryngeal elevation. Rx Regular texture diet.

## 2018-05-28 NOTE — SWALLOW BEDSIDE ASSESSMENT ADULT - NS ASR SWALLOW FINDINGS DISCUS
Neuro Resident Dr. Multani, RN Edilia, Pt's wife daughters and granddaughter/Physician/Nursing/Patient/Family

## 2018-05-28 NOTE — PROGRESS NOTE ADULT - ASSESSMENT
s/p gastric pullup.  esopahgectomy.  no dysphagia per patient, agree eat slow.  speech and swallow eval.  pt with ? tia.  defer neuro.  there was an issue with fobt positive and anemia last admission, but no contraindication to a/c if needed gi perspective.

## 2018-05-28 NOTE — SWALLOW BEDSIDE ASSESSMENT ADULT - SLP PERTINENT HISTORY OF CURRENT PROBLEM
82 y/o M former smoker w/ hx of HTN, CVA, Seizures, RA, CAD s/p 6 stents, Esophageal CA s/p chemo/RT and esophagogastrectomy (anastomotic leak following esophagectomy), OA R knee, RA, former smoker (quit smoking in distant past), Asthma, BCC (skin), Anxiety, wheelchair bound presents with a few days double vision, dropping things from his right hand and feeling like he's moving from side to side. Residual R visual field cut. BL needs assistance with ADLs. CTH: No acute ICH, mass effect, or evidence of acute vascular territorial infarction. Chronic R parietal and L occipital infarcts. NIHSS 6, preMRS 3

## 2018-05-28 NOTE — PHYSICAL THERAPY INITIAL EVALUATION ADULT - PERTINENT HX OF CURRENT PROBLEM, REHAB EVAL
82 y/o M former smoker w/ hx of HTN, CVA, Seizures, RA, CAD s/p 6 stents, Esophageal CA s/p chemo/RT and esophagogastrectomy, wheelchair bound presents with a few days double vision, dropping things from his right hand and feeliong like he's moving from side to side. Pe right field cut, subtle R Nl flattening R side weaker than  left RUE ataxia out of proprtion to weakness CTh chronic right parietal and left occipital infarcts,

## 2018-05-28 NOTE — PHYSICAL THERAPY INITIAL EVALUATION ADULT - CRITERIA FOR SKILLED THERAPEUTIC INTERVENTIONS
predicted duration of therapy intervention/anticipated equipment needs at discharge/impairments found/risk reduction/prevention/therapy frequency/anticipated discharge recommendation/rehab potential/functional limitations in following categories

## 2018-05-28 NOTE — PHYSICAL THERAPY INITIAL EVALUATION ADULT - GENERAL OBSERVATIONS, REHAB EVAL
Pt encountered supine in bed, + IV lock. family at bedside. AO x 3, moving all 4 extremities ad cathy

## 2018-05-28 NOTE — PROGRESS NOTE ADULT - SUBJECTIVE AND OBJECTIVE BOX
MEJIA MCCORDBA:[id number],   82yMale followed for:  penicillin (Rash)    PAST MEDICAL & SURGICAL HISTORY:  Coronary artery disease: s/p 3 stents on June 30, 2017 at [facility]  OA (osteoarthritis) of knee: right  Former smoker, stopped smoking in distant past  Rheumatoid arthritis  Seizure disorder: 1 episode approximately 15 yrs ago  Asthma  Hyperlipidemia  BPH (benign prostatic hyperplasia)  HTN (hypertension)  Esophagus cancer  Chronic allergic rhinitis  BCC (basal cell carcinoma): skin  Cerebrovascular accident (CVA)  Anxiety  Anastomotic leak following esophagectomy: Bassett logan esophagectomy  History of tonsillectomy  H/O heart artery stent: June 30, 2017  Knee arthropathy: left  History of total hip replacement: left  History of hernia repair    FAMILY HISTORY:  Family history of heart attack  Family history of pulmonary embolism: father @ 49 yr old  FH: CAD (coronary artery disease) (Sibling)    MEDICATIONS  (STANDING):  aspirin enteric coated 81 milliGRAM(s) Oral daily  atorvastatin 80 milliGRAM(s) Oral at bedtime  buDESOnide  80 MICROgram(s)/formoterol 4.5 MICROgram(s) Inhaler 2 Puff(s) Inhalation two times a day  clopidogrel Tablet 75 milliGRAM(s) Oral daily  cyanocobalamin 500 MICROGram(s) Oral daily  enoxaparin Injectable 40 milliGRAM(s) SubCutaneous every 24 hours  finasteride 5 milliGRAM(s) Oral daily  folic acid 1 milliGRAM(s) Oral daily  levothyroxine 25 MICROGram(s) Oral daily  midodrine 10 milliGRAM(s) Oral three times a day  montelukast 10 milliGRAM(s) Oral daily  predniSONE   Tablet 10 milliGRAM(s) Oral daily  pyridoxine 100 milliGRAM(s) Oral daily  tiotropium 18 MICROgram(s) Capsule 1 Capsule(s) Inhalation daily    MEDICATIONS  (PRN):  ALBUTerol    90 MICROgram(s) HFA Inhaler 2 Puff(s) Inhalation every 6 hours PRN Shortness of Breath and/or Wheezing      Vital Signs Last 24 Hrs  T(C): 36.3 (28 May 2018 07:50), Max: 36.4 (28 May 2018 05:37)  T(F): 97.4 (28 May 2018 07:50), Max: 97.5 (28 May 2018 05:37)  HR: 71 (28 May 2018 07:50) (66 - 74)  BP: 138/73 (28 May 2018 07:50) (129/74 - 149/67)  BP(mean): --  RR: 16 (28 May 2018 07:50) (16 - 20)  SpO2: 94% (28 May 2018 07:50) (94% - 96%)  nc/at  s1s2  cta  soft, nt, nd no guarding or rebound  no c/c/e    CBC Full  -  ( 27 May 2018 00:44 )  WBC Count : 5.4 K/uL  Hemoglobin : 11.0 g/dL  Hematocrit : 34.0 %  Platelet Count - Automated : 161 K/uL  Mean Cell Volume : 98.8 fl  Mean Cell Hemoglobin : 31.9 pg  Mean Cell Hemoglobin Concentration : 32.3 gm/dL  Auto Neutrophil # : 3.7 K/uL  Auto Lymphocyte # : 0.9 K/uL  Auto Monocyte # : 0.6 K/uL  Auto Eosinophil # : 0.2 K/uL  Auto Basophil # : 0.0 K/uL  Auto Neutrophil % : 68.6 %  Auto Lymphocyte % : 16.1 %  Auto Monocyte % : 10.9 %  Auto Eosinophil % : 4.3 %  Auto Basophil % : 0.0 %    05-27    139  |  105  |  15  ----------------------------<  81  4.0   |  28  |  0.64    Ca    9.7      27 May 2018 00:44    TPro  7.1  /  Alb  3.4  /  TBili  0.3  /  DBili  x   /  AST  37  /  ALT  37  /  AlkPhos  230<H>  05-27    PT/INR - ( 27 May 2018 00:44 )   PT: 11.7 sec;   INR: 1.07 ratio         PTT - ( 27 May 2018 00:44 )  PTT:32.5 sec

## 2018-05-28 NOTE — SWALLOW BEDSIDE ASSESSMENT ADULT - SLP PRECAUTIONS/LIMITATIONS: HEARING
Mildly to Moderately Impaired: difficulty hearing in some environments or speaker may need to increase volume or speak distinctly; b/l HA

## 2018-05-28 NOTE — PHYSICAL THERAPY INITIAL EVALUATION ADULT - ADDITIONAL COMMENTS
82 year old male who PTA was living in an apartment with wife and home health aide few hours a day 5-7 days a week (see care coordination notes for more specifics on home health aid services) past 2 weeks pt has been mainly functioning in a wheelchair, however, in general pt is able to ambulate with a walker and assist household distances with supervision/CGA. Pt requires assist with ADL's. wife and aides are able to care for pt with mobility and ADL needs.

## 2018-05-28 NOTE — SWALLOW BEDSIDE ASSESSMENT ADULT - SWALLOW EVAL: RECOMMENDED FEEDING/EATING TECHNIQUES
maintain upright posture during/after eating for 30 mins/oral hygiene/position upright (90 degrees)/allow for swallow between intakes/small sips/bites/small frequent meals, minimize distractions,

## 2018-05-28 NOTE — SWALLOW BEDSIDE ASSESSMENT ADULT - PHARYNGEAL PHASE
No overt s/s laryngeal penetration/aspiration observed; pharyngeal swallow judged to be timely; laryngeal excursion palpable.

## 2018-05-28 NOTE — SWALLOW BEDSIDE ASSESSMENT ADULT - ASR SWALLOW ASPIRATION MONITOR
cough/change of breathing pattern/*If evident, report to MD immediately and d/c oral diet./gurgly voice/fever/pneumonia/throat clearing/upper respiratory infection

## 2018-05-28 NOTE — PROGRESS NOTE ADULT - SUBJECTIVE AND OBJECTIVE BOX
Neurology Progress    MEJIA LATIF82yMale    HPI:  82 y/o M former smoker w/ hx of HTN, CVA, Seizures, RA, CAD s/p 6 stents, Esophageal CA s/p chemo/RT and esophagogastrectomy, wheelchair bound presents with a few days double vision, dropping things from his right hand and feeliong like he's moving from side to side. Residual R visual field cut. BL needs assistance with ADLs (27 May 2018 08:37)      Past Medical History  Coronary artery disease  OA (osteoarthritis) of knee  Former smoker, stopped smoking in distant past  Former smoker  Rheumatoid arthritis  Seizure disorder  Asthma  Hyperlipidemia  BPH (benign prostatic hyperplasia)  HTN (hypertension)  Esophagus cancer  Chronic allergic rhinitis  BCC (basal cell carcinoma)  Cerebrovascular accident (CVA)  Anxiety      Past Surgical History  Anastomotic leak following esophagectomy  History of tonsillectomy  H/O heart artery stent  Knee arthropathy  History of total hip replacement  History of hernia repair      MEDICATIONS    ALBUTerol    90 MICROgram(s) HFA Inhaler 2 Puff(s) Inhalation every 6 hours PRN  aspirin enteric coated 81 milliGRAM(s) Oral daily  atorvastatin 80 milliGRAM(s) Oral at bedtime  buDESOnide  80 MICROgram(s)/formoterol 4.5 MICROgram(s) Inhaler 2 Puff(s) Inhalation two times a day  clopidogrel Tablet 75 milliGRAM(s) Oral daily  cyanocobalamin 500 MICROGram(s) Oral daily  enoxaparin Injectable 40 milliGRAM(s) SubCutaneous every 24 hours  finasteride 5 milliGRAM(s) Oral daily  folic acid 1 milliGRAM(s) Oral daily  levothyroxine 25 MICROGram(s) Oral daily  midodrine 10 milliGRAM(s) Oral three times a day  montelukast 10 milliGRAM(s) Oral daily  predniSONE   Tablet 10 milliGRAM(s) Oral daily  pyridoxine 100 milliGRAM(s) Oral daily  tiotropium 18 MICROgram(s) Capsule 1 Capsule(s) Inhalation daily         Family history: No history of dementia, strokes, or seizures   FAMILY HISTORY:  Family history of heart attack  Family history of pulmonary embolism: father @ 49 yr old  FH: CAD (coronary artery disease) (Sibling)    SOCIAL HISTORY -- No history of tobacco or alcohol use     Allergies    penicillin (Rash)    Intolerances        Height (cm): 160.02 (05-28 @ 06:03)  Weight (kg): 57.8 (05-28 @ 06:03)  BMI (kg/m2): 22.6 (05-28 @ 06:03)    Vital Signs Last 24 Hrs  T(C): 36.3 (28 May 2018 07:50), Max: 36.4 (28 May 2018 05:37)  T(F): 97.4 (28 May 2018 07:50), Max: 97.5 (28 May 2018 05:37)  HR: 71 (28 May 2018 07:50) (66 - 74)  BP: 138/73 (28 May 2018 07:50) (129/74 - 149/67)  BP(mean): --  RR: 16 (28 May 2018 07:50) (16 - 20)  SpO2: 94% (28 May 2018 07:50) (94% - 96%)        On Neurological Examination:    Mental Status - Patient is alert, awake, oriented X3. .   Follows commands well and able to answer questions appropriately. Mood and affect  normal  Follow simple commands able to repeat  able to name.  Speech - Fluent no Dysarthria  no  Aphasia                              Cranial Nerves - Extraocular muscle intact  CARLOS Facial symmetry Tongue midline, CnV1to V3 intact gross hearing intact      Motor Exam -   Right upper  5/5 throughout  Left upper 5/5 throughtout  Right lower- 4/5 throughout  Left lower 4/5 throughout  Coordination -finger to nose intact  Muscle tone - is normal all over. No asymmetry is seen.      Sensory    Bilateral intact to light touch    Gait -  normal  no ataxia     GENERAL Exam:     Nontoxic , No Acute Distress   	  HEENT:  normocephalic, atraumatic  		  LUNGS:	Clear bilaterally  No Wheeze      VASCULAR: no carotid brui  	  HEART:	 Normal S1S2   No murmur RRR        	  MUSCULOSKELETAL: Normal Range of Motion  	   SKIN:      Normal   No Ecchymosis               LABS:  CBC Full  -  ( 27 May 2018 00:44 )  WBC Count : 5.4 K/uL  Hemoglobin : 11.0 g/dL  Hematocrit : 34.0 %  Platelet Count - Automated : 161 K/uL  Mean Cell Volume : 98.8 fl  Mean Cell Hemoglobin : 31.9 pg  Mean Cell Hemoglobin Concentration : 32.3 gm/dL  Auto Neutrophil # : 3.7 K/uL  Auto Lymphocyte # : 0.9 K/uL  Auto Monocyte # : 0.6 K/uL  Auto Eosinophil # : 0.2 K/uL  Auto Basophil # : 0.0 K/uL  Auto Neutrophil % : 68.6 %  Auto Lymphocyte % : 16.1 %  Auto Monocyte % : 10.9 %  Auto Eosinophil % : 4.3 %  Auto Basophil % : 0.0 %      05-27    139  |  105  |  15  ----------------------------<  81  4.0   |  28  |  0.64    Ca    9.7      27 May 2018 00:44    TPro  7.1  /  Alb  3.4  /  TBili  0.3  /  DBili  x   /  AST  37  /  ALT  37  /  AlkPhos  230<H>  05-27    Hemoglobin A1C: Hemoglobin A1C, Whole Blood: 4.7 % (05-27 @ 12:16)  Hemoglobin A1C, Whole Blood: 4.7 % (05-27 @ 12:16)      LIVER FUNCTIONS - ( 27 May 2018 00:44 )  Alb: 3.4 g/dL / Pro: 7.1 g/dL / ALK PHOS: 230 U/L / ALT: 37 U/L / AST: 37 U/L / GGT: x           Vitamin B12   PT/INR - ( 27 May 2018 00:44 )   PT: 11.7 sec;   INR: 1.07 ratio         PTT - ( 27 May 2018 00:44 )  PTT:32.5 sec      RADIOLOGY  < from: MRI Head w/wo Cont (09.30.09 @ 12:43) >  EXAM:  MRI BRAIN WO/W CONTRAST    PROCEDURE DATE:  Sep 30 2009     .    INTERPRETATION:  Exam Type: MRI BRAIN WITHOUT AND WITH, MRA COW WITHOUT   CONTRAST, MRA NECK WITH AND WITHOUT CONTRAST     DATE AND TIME: 09/30/2009 at 11:58 a.m.    INDICATION: Left-sided weakness and dysarthria    COMPARISON: Brain CT of 09/29/2009.    TECHNIQUE: Multiplanar MR imaging of the brain was obtained without   contrast. Time of flight MRA of the neck and Kaw of [place] was   obtained. In addition postcontrastdynamic MRA neck was obtained after   the administration of contrast.  20 mls of Magnevist was administered   intravenously without complication and 0 mls were discarded.    The following sequences were obtained.  Diffusion-weighted images and ADC map.  Axial gradient echo.  Axial SPGR and FSE T2.  Sagittal and axial T1 FLAIR  Axial T1 postcontrast  3-D TOF COW, 2D TOF carotids and 3-D bifurcation  Coronal contrast-enhanced MRA of the neck    FINDINGS: There are several tiny foci of restricted diffusion in the   distribution of right middle cerebral artery suggesting with acute   infarcts in the distal middle cerebral artery territory. There is   encephalomalacia and gliosis in the left occipital region likely related   to an old infarct. There is no intracranial hemorrhage, mass or midline   shift.   The ventricles and sulci are normal in size and configuration for   patients stated age. The cerebellar tonsils are in normal location. The   intracranial flow voids are normal in appearance. The visualized   paranasal sinuses and mastoids are clear.    Post contrast images demonstrate no abnormal parenchymal or   leptomeningeal enhancement.    MRA of the [place] of [place] demonstrates smaller number of right middle   cerebral artery branches in the sylvian fissure. This could be related to   embolic phenomenon. The calcarine branch of the left posterior cerebral   artery is not seen, likely occluded. The bilateral anterior cerebral,    left middle cerebral and the right posterior cerebral arteries are   unremarkable. The intracranial portions of the basilar artery and   internal carotid arteries are well visualized. There is no vascular   aneurysm.    MRA of the neck demonstrates good flow-related signal within the   bilateral common, internal common external carotid arteries. The   vertebral arteries are well visualized. There is no flow-limiting   stenosis or vascular aneurysm.      IMPRESSION:   Several tiny acute infarcts in the distal right middle   cerebral artery distribution. Old infarct in the left occipital region.   No hemorrhage, mass or midline shift. This  could be related to emboli.  Smaller number of right middle cerebral artery branches in the sylvian   fissure relative to the left. Probable occluded calcarine branch left   posterior cerebral artery.      CHU HAYES M.D., RADIOLOGY FELLOW  PRABHU JAMA MD, ATTENDING RADIOLOGIST    This examination was interpreted on:  Sep 30 2009  1:55P.  This document   has been electronically signed. Sep 30 82557:[id number].          < end of copied text >    EKG          [facility]  Neurology Progress    MEJIA LATIF82yMale    HPI:  82 y/o M former smoker w/ hx of HTN, CVA, Seizures, RA, CAD s/p 6 stents, Esophageal CA s/p chemo/RT and esophagogastrectomy, wheelchair bound presents with a few days double vision, dropping things from his right hand and feeliong like he's moving from side to side. Residual R visual field cut. BL needs assistance with ADLs (27 May 2018 08:37)      Past Medical History  Coronary artery disease  OA (osteoarthritis) of knee  Former smoker, stopped smoking in distant past  Former smoker  Rheumatoid arthritis  Seizure disorder  Asthma  Hyperlipidemia  BPH (benign prostatic hyperplasia)  HTN (hypertension)  Esophagus cancer  Chronic allergic rhinitis  BCC (basal cell carcinoma)  Cerebrovascular accident (CVA)  Anxiety      Past Surgical History  Anastomotic leak following esophagectomy  History of tonsillectomy  H/O heart artery stent  Knee arthropathy  History of total hip replacement  History of hernia repair      MEDICATIONS    ALBUTerol    90 MICROgram(s) HFA Inhaler 2 Puff(s) Inhalation every 6 hours PRN  aspirin enteric coated 81 milliGRAM(s) Oral daily  atorvastatin 80 milliGRAM(s) Oral at bedtime  buDESOnide  80 MICROgram(s)/formoterol 4.5 MICROgram(s) Inhaler 2 Puff(s) Inhalation two times a day  clopidogrel Tablet 75 milliGRAM(s) Oral daily  cyanocobalamin 500 MICROGram(s) Oral daily  enoxaparin Injectable 40 milliGRAM(s) SubCutaneous every 24 hours  finasteride 5 milliGRAM(s) Oral daily  folic acid 1 milliGRAM(s) Oral daily  levothyroxine 25 MICROGram(s) Oral daily  midodrine 10 milliGRAM(s) Oral three times a day  montelukast 10 milliGRAM(s) Oral daily  predniSONE   Tablet 10 milliGRAM(s) Oral daily  pyridoxine 100 milliGRAM(s) Oral daily  tiotropium 18 MICROgram(s) Capsule 1 Capsule(s) Inhalation daily         Family history: No history of dementia, strokes, or seizures   FAMILY HISTORY:  Family history of heart attack  Family history of pulmonary embolism: father @ 49 yr old  FH: CAD (coronary artery disease) (Sibling)    SOCIAL HISTORY -- No history of tobacco or alcohol use     Allergies    penicillin (Rash)    Intolerances        Height (cm): 160.02 (05-28 @ 06:03)  Weight (kg): 57.8 (05-28 @ 06:03)  BMI (kg/m2): 22.6 (05-28 @ 06:03)    Vital Signs Last 24 Hrs  T(C): 36.3 (28 May 2018 07:50), Max: 36.4 (28 May 2018 05:37)  T(F): 97.4 (28 May 2018 07:50), Max: 97.5 (28 May 2018 05:37)  HR: 71 (28 May 2018 07:50) (66 - 74)  BP: 138/73 (28 May 2018 07:50) (129/74 - 149/67)  BP(mean): --  RR: 16 (28 May 2018 07:50) (16 - 20)  SpO2: 94% (28 May 2018 07:50) (94% - 96%)        On Neurological Examination:    Mental Status - Patient is alert, awake, oriented X3. .   Follows commands well and able to answer questions appropriately. Mood and affect  normal  Follow simple commands able to repeat  able to name.  Speech - Fluent no Dysarthria  no  Aphasia                              Cranial Nerves - Extraocular muscle intact  CARLOS Facial symmetry Tongue midline, CnV1to V3 intact gross hearing intact      Motor Exam -   Right upper  5/5 throughout  Left upper 5/5 throughtout  Right lower- 4/5 throughout  Left lower 4/5 throughout  Coordination -finger to nose intact  Muscle tone - is normal all over. No asymmetry is seen.      Sensory    Bilateral intact to light touch    Gait -  normal  no ataxia     GENERAL Exam:     Nontoxic , No Acute Distress   	  HEENT:  normocephalic, atraumatic  		  LUNGS:	Clear bilaterally  No Wheeze      VASCULAR: no carotid brui  	  HEART:	 Normal S1S2   No murmur RRR        	  MUSCULOSKELETAL: Normal Range of Motion  	   SKIN:      Normal   No Ecchymosis               LABS:  CBC Full  -  ( 27 May 2018 00:44 )  WBC Count : 5.4 K/uL  Hemoglobin : 11.0 g/dL  Hematocrit : 34.0 %  Platelet Count - Automated : 161 K/uL  Mean Cell Volume : 98.8 fl  Mean Cell Hemoglobin : 31.9 pg  Mean Cell Hemoglobin Concentration : 32.3 gm/dL  Auto Neutrophil # : 3.7 K/uL  Auto Lymphocyte # : 0.9 K/uL  Auto Monocyte # : 0.6 K/uL  Auto Eosinophil # : 0.2 K/uL  Auto Basophil # : 0.0 K/uL  Auto Neutrophil % : 68.6 %  Auto Lymphocyte % : 16.1 %  Auto Monocyte % : 10.9 %  Auto Eosinophil % : 4.3 %  Auto Basophil % : 0.0 %      05-27    139  |  105  |  15  ----------------------------<  81  4.0   |  28  |  0.64    Ca    9.7      27 May 2018 00:44    TPro  7.1  /  Alb  3.4  /  TBili  0.3  /  DBili  x   /  AST  37  /  ALT  37  /  AlkPhos  230<H>  05-27    Hemoglobin A1C: Hemoglobin A1C, Whole Blood: 4.7 % (05-27 @ 12:16)  Hemoglobin A1C, Whole Blood: 4.7 % (05-27 @ 12:16)      LIVER FUNCTIONS - ( 27 May 2018 00:44 )  Alb: 3.4 g/dL / Pro: 7.1 g/dL / ALK PHOS: 230 U/L / ALT: 37 U/L / AST: 37 U/L / GGT: x           Vitamin B12   PT/INR - ( 27 May 2018 00:44 )   PT: 11.7 sec;   INR: 1.07 ratio         PTT - ( 27 May 2018 00:44 )  PTT:32.5 sec      RADIOLOGY    EKG          [facility]

## 2018-05-28 NOTE — PROGRESS NOTE ADULT - ASSESSMENT
82 y/o M former smoker w/ hx of HTN, CVA, Seizures, RA, CAD s/p 6 stents, Esophageal CA s/p chemo/RT and esophagogastrectomy, wheelchair bound presents with a few days double vision, dropping things from his right hand and feeliong like he's moving from side to side. Pe right field cut, subtle R Nl flattening R side weaker than  left RUE ataxia out of proprtion to weakness CTh chronic right parietal and left occipital infarcts NIHSS 6, preMRS 3    Impression     No new stroke, double vision from dilantin toxicity, double vision improved.     Plan     discharge to home after discussion with

## 2018-05-29 ENCOUNTER — TRANSCRIPTION ENCOUNTER (OUTPATIENT)
Age: 83
End: 2018-05-29

## 2018-05-29 VITALS
DIASTOLIC BLOOD PRESSURE: 60 MMHG | HEART RATE: 72 BPM | SYSTOLIC BLOOD PRESSURE: 109 MMHG | TEMPERATURE: 97 F | OXYGEN SATURATION: 94 % | RESPIRATION RATE: 18 BRPM

## 2018-05-29 LAB
CULTURE RESULTS: NO GROWTH — SIGNIFICANT CHANGE UP
HCT VFR BLD CALC: 31.5 % — LOW (ref 39–50)
HGB BLD-MCNC: 10.5 G/DL — LOW (ref 13–17)
MCHC RBC-ENTMCNC: 32.7 PG — SIGNIFICANT CHANGE UP (ref 27–34)
MCHC RBC-ENTMCNC: 33.4 GM/DL — SIGNIFICANT CHANGE UP (ref 32–36)
MCV RBC AUTO: 97.6 FL — SIGNIFICANT CHANGE UP (ref 80–100)
PHENYTOIN FREE SERPL-MCNC: 22.3 UG/ML — HIGH (ref 10–20)
PLATELET # BLD AUTO: 167 K/UL — SIGNIFICANT CHANGE UP (ref 150–400)
RBC # BLD: 3.23 M/UL — LOW (ref 4.2–5.8)
RBC # FLD: 15.7 % — HIGH (ref 10.3–14.5)
SPECIMEN SOURCE: SIGNIFICANT CHANGE UP
WBC # BLD: 5.1 K/UL — SIGNIFICANT CHANGE UP (ref 3.8–10.5)
WBC # FLD AUTO: 5.1 K/UL — SIGNIFICANT CHANGE UP (ref 3.8–10.5)

## 2018-05-29 PROCEDURE — 70544 MR ANGIOGRAPHY HEAD W/O DYE: CPT

## 2018-05-29 PROCEDURE — 82947 ASSAY GLUCOSE BLOOD QUANT: CPT

## 2018-05-29 PROCEDURE — 82962 GLUCOSE BLOOD TEST: CPT

## 2018-05-29 PROCEDURE — 71045 X-RAY EXAM CHEST 1 VIEW: CPT

## 2018-05-29 PROCEDURE — 85730 THROMBOPLASTIN TIME PARTIAL: CPT

## 2018-05-29 PROCEDURE — 93005 ELECTROCARDIOGRAM TRACING: CPT

## 2018-05-29 PROCEDURE — 99285 EMERGENCY DEPT VISIT HI MDM: CPT

## 2018-05-29 PROCEDURE — 82803 BLOOD GASES ANY COMBINATION: CPT

## 2018-05-29 PROCEDURE — 97165 OT EVAL LOW COMPLEX 30 MIN: CPT

## 2018-05-29 PROCEDURE — 80185 ASSAY OF PHENYTOIN TOTAL: CPT

## 2018-05-29 PROCEDURE — 85610 PROTHROMBIN TIME: CPT

## 2018-05-29 PROCEDURE — 85027 COMPLETE CBC AUTOMATED: CPT

## 2018-05-29 PROCEDURE — 70548 MR ANGIOGRAPHY NECK W/DYE: CPT

## 2018-05-29 PROCEDURE — 84295 ASSAY OF SERUM SODIUM: CPT

## 2018-05-29 PROCEDURE — A9585: CPT

## 2018-05-29 PROCEDURE — 83605 ASSAY OF LACTIC ACID: CPT

## 2018-05-29 PROCEDURE — 82435 ASSAY OF BLOOD CHLORIDE: CPT

## 2018-05-29 PROCEDURE — 70450 CT HEAD/BRAIN W/O DYE: CPT

## 2018-05-29 PROCEDURE — 92610 EVALUATE SWALLOWING FUNCTION: CPT

## 2018-05-29 PROCEDURE — 84132 ASSAY OF SERUM POTASSIUM: CPT

## 2018-05-29 PROCEDURE — 70551 MRI BRAIN STEM W/O DYE: CPT

## 2018-05-29 PROCEDURE — 85014 HEMATOCRIT: CPT

## 2018-05-29 PROCEDURE — 80061 LIPID PANEL: CPT

## 2018-05-29 PROCEDURE — 94640 AIRWAY INHALATION TREATMENT: CPT

## 2018-05-29 PROCEDURE — 83036 HEMOGLOBIN GLYCOSYLATED A1C: CPT

## 2018-05-29 PROCEDURE — 80053 COMPREHEN METABOLIC PANEL: CPT

## 2018-05-29 PROCEDURE — 87086 URINE CULTURE/COLONY COUNT: CPT

## 2018-05-29 PROCEDURE — 81003 URINALYSIS AUTO W/O SCOPE: CPT

## 2018-05-29 PROCEDURE — 82330 ASSAY OF CALCIUM: CPT

## 2018-05-29 PROCEDURE — 97161 PT EVAL LOW COMPLEX 20 MIN: CPT

## 2018-05-29 RX ORDER — ALPRAZOLAM 0.25 MG
0.25 TABLET ORAL ONCE
Qty: 0 | Refills: 0 | Status: DISCONTINUED | OUTPATIENT
Start: 2018-05-29 | End: 2018-05-29

## 2018-05-29 RX ADMIN — Medication 100 MILLIGRAM(S): at 12:33

## 2018-05-29 RX ADMIN — MIDODRINE HYDROCHLORIDE 10 MILLIGRAM(S): 2.5 TABLET ORAL at 12:39

## 2018-05-29 RX ADMIN — MONTELUKAST 10 MILLIGRAM(S): 4 TABLET, CHEWABLE ORAL at 12:33

## 2018-05-29 RX ADMIN — FINASTERIDE 5 MILLIGRAM(S): 5 TABLET, FILM COATED ORAL at 12:34

## 2018-05-29 RX ADMIN — MIDODRINE HYDROCHLORIDE 10 MILLIGRAM(S): 2.5 TABLET ORAL at 05:17

## 2018-05-29 RX ADMIN — Medication 10 MILLIGRAM(S): at 05:17

## 2018-05-29 RX ADMIN — Medication 81 MILLIGRAM(S): at 12:34

## 2018-05-29 RX ADMIN — Medication 25 MICROGRAM(S): at 05:17

## 2018-05-29 RX ADMIN — Medication 1 MILLIGRAM(S): at 12:33

## 2018-05-29 RX ADMIN — PREGABALIN 500 MICROGRAM(S): 225 CAPSULE ORAL at 12:33

## 2018-05-29 RX ADMIN — CLOPIDOGREL BISULFATE 75 MILLIGRAM(S): 75 TABLET, FILM COATED ORAL at 12:34

## 2018-05-29 RX ADMIN — TIOTROPIUM BROMIDE 1 CAPSULE(S): 18 CAPSULE ORAL; RESPIRATORY (INHALATION) at 12:34

## 2018-05-29 RX ADMIN — Medication 0.25 MILLIGRAM(S): at 12:33

## 2018-05-29 RX ADMIN — BUDESONIDE AND FORMOTEROL FUMARATE DIHYDRATE 2 PUFF(S): 160; 4.5 AEROSOL RESPIRATORY (INHALATION) at 05:17

## 2018-05-29 NOTE — DISCHARGE NOTE ADULT - HOSPITAL COURSE
82 y/o M former smoker w/ hx of HTN, CVA, seizures on dilantin, RA, CAD s/p 6 stents, Esophageal CA s/p chemo/RT and esophagogastrectomy, wheelchair bound presents with a few days double vision, dropping things from his right hand and feeling like he's moving from side to side. Residual R visual field cut.   As phenytoin level on admission was 34.6, it was held. MRI brain and MRA head/neck were performed and showed no acute pathology. Patient's presenting symptoms are attributed to phenytoin toxicity. Level decreased to 22.3 on discharge with medication held.   Pt was evaluated by PT/OT who recommended home with resumption of home services. Also evaluated by speech pathology with a bedside swallow evaluation, who recommended a regular consistency diet.   Patient was noted to have a hematoma with external bleeding on his right forearm at a prior IV site. H/H remained stable. A hemostat dressing was applied and wound care was consulted. 82 y/o M former smoker w/ hx of HTN, CVA, seizures on dilantin, RA, CAD s/p 6 stents, Esophageal CA s/p chemo/RT and esophagogastrectomy, wheelchair bound presents with a few days double vision, dropping things from his right hand and feeling like he's moving from side to side. Residual R visual field cut.   As phenytoin level on admission was 34.6, it was held. MRI brain and MRA head/neck were performed and showed no acute pathology. Patient's presenting symptoms are attributed to phenytoin toxicity. Level decreased to 22.3 on discharge with medication held.   Pt was evaluated by PT/OT who recommended home with resumption of home services. Also evaluated by speech pathology with a bedside swallow evaluation, who recommended a regular consistency diet.   Patient was noted to have a hematoma with external bleeding on his right forearm at a prior IV site. H/H remained stable. A hemostatic dressing was applied, bleeding improved but continued. Wound was cauterized with silver nitrate, which finally stopped the bleeding after consultation with surgery. Pt [facility] w/ [facility] as outpatient. 84 y/o M former smoker w/ hx of HTN, CVA, seizures on dilantin, RA, CAD s/p 6 stents, Esophageal CA s/p chemo/RT and esophagogastrectomy, wheelchair bound presents with a few days double vision, dropping things from his right hand and feeling like he's moving from side to side. Residual R visual field cut.   As phenytoin level on admission was 34.6, it was held. MRI brain and MRA head/neck were performed and showed no acute pathology. Patient's presenting symptoms are attributed to phenytoin toxicity. Level decreased to 22.3 on discharge with medication held. His dose is decreased from 400 mg total daily to 300 mg total daily.  Pt was evaluated by PT/OT who recommended home with resumption of home services. Also evaluated by speech pathology with a bedside swallow evaluation, who recommended a regular consistency diet.   Patient was noted to have a hematoma with external bleeding on his right forearm at a prior IV site. H/H remained stable. A hemostatic dressing was applied, bleeding improved but continued. Wound was cauterized with silver nitrate, which finally stopped the bleeding after consultation with surgery. Pt to f/u w/ [facility] as outpatient.

## 2018-05-29 NOTE — DISCHARGE NOTE ADULT - PATIENT PORTAL LINK FT
You can access the [facility], offered by [facility], by registering with the following website: [email or web address]

## 2018-05-29 NOTE — DISCHARGE NOTE ADULT - OTHER SIGNIFICANT FINDINGS
< from: MR Angio Head No Cont (05.27.18 @ 19:36) >    IMPRESSION:  Imaging is slightly limited by motion through the brain.No acute or   subacute infarction. Chronic bilateral PCA territory infarctions as   described.  Intracranial MR angiography is limited by patient motion. No obvious   aneurysm or vascular occlusion.  Extracranial MR angiography demonstrates right carotid bifurcation mild   atherosclerotic disease without flow-limiting stenosis. Otherwise no   significant stenosis.    < end of copied text > < from: MR Angio Head No Cont (05.27.18 @ 19:36) >    IMPRESSION:  Imaging is slightly limited by motion through the brain.No acute or   subacute infarction. Chronic bilateral PCA territory infarctions as   described.  Intracranial MR angiography is limited by patient motion. No obvious   aneurysm or vascular occlusion.  Extracranial MR angiography demonstrates right carotid bifurcation mild   atherosclerotic disease without flow-limiting stenosis. Otherwise no   significant stenosis.

## 2018-05-29 NOTE — OCCUPATIONAL THERAPY INITIAL EVALUATION ADULT - DIAGNOSIS, OT EVAL
Pt demonstrated decreased strength, balance impacting pt's ability to participate in functional mobility and ADLs.

## 2018-05-29 NOTE — DISCHARGE NOTE ADULT - CARE PLAN
Principal Discharge DX:	Phenytoin toxicity  Goal:	Treatment  Assessment and plan of treatment:	Continue medications as prescribed. Follow up with your neurologist in 1-2 weeks for further management. Principal Discharge DX:	Phenytoin toxicity  Goal:	Treatment  Assessment and plan of treatment:	Follow up with your neurologist (Dr. Velasquez) in 1-2 weeks for further management.  Please [facility] with [facility] at [address] within the next week. Their phone number is [phone].  We have decreased your Dilantin to 100mg by mouth twice a day, please continue to take it as such.  Please continue to take the rest of your home medications as prescribed. Principal Discharge DX:	Phenytoin toxicity  Goal:	Treatment  Assessment and plan of treatment:	Follow up with your neurologist (Dr. Velasquez) in 1-2 weeks for further management.  Please follow up with the [facility] at [address] within the next week. Their phone number is [phone].  We have decreased your Dilantin to 100mg by mouth twice a day, please continue to take it as such.  Please continue to take the rest of your home medications as prescribed.  Please keep your wound and dressing dry until seen at the wound care center, in the meantime please have the visiting nurse service change the dressing with gauze and a ignacio as needed. Principal Discharge DX:	Phenytoin toxicity  Goal:	Treatment  Assessment and plan of treatment:	Follow up with your neurologist (Dr. Velasquez) in 1-2 weeks for further management.  Please follow up with the [facility] at [address] within the next week. Their phone number is [phone].  We have decreased your Dilantin to 100mg by mouth three times a day, please continue to take it as such.  Please continue to take the rest of your home medications as prescribed.  Please keep your wound and dressing dry until seen at the wound care center, in the meantime please have the visiting nurse service change the dressing with gauze and a ignacio as needed.

## 2018-05-29 NOTE — PROGRESS NOTE ADULT - ASSESSMENT
stable from GI standpoint, no active GI bleeding, passed speech/swallow evaluation, no dysphagia  continue current care

## 2018-05-29 NOTE — DISCHARGE NOTE ADULT - CARE PROVIDERS DIRECT ADDRESSES
[email or web address]

## 2018-05-29 NOTE — DISCHARGE NOTE ADULT - CARE PROVIDER_API CALL
Lindsey Velasquez), Neurology  [address] [place] [address]  Phone: [phone]  Fax: [phone] Lindsey Velasquez), Neurology  [address] [place] [address] [address]  [place] [address]  Phone: [phone]  Fax: [phone]    [facility],   [address] [place] [address]  Phone: [phone]  Fax: (   )    -

## 2018-05-29 NOTE — DISCHARGE NOTE ADULT - PROVIDER TOKENS
TOKEN:'[email or web address]' TOKEN:'[email or web address]',FREE:[LAST:[[facility]],PHONE:[phone]],FAX:[(   )    -],ADDRESS:[[facility] [address]  [place] [id number]]]

## 2018-05-29 NOTE — OCCUPATIONAL THERAPY INITIAL EVALUATION ADULT - BALANCE TRAINING, PT EVAL
GOAL: Pt will demonstrate improved static/dynamic balance to good in order to increase safety and independence in ADLs within 4 weeks

## 2018-05-29 NOTE — OCCUPATIONAL THERAPY INITIAL EVALUATION ADULT - ADDITIONAL COMMENTS
Intracranial MR angiography is limited by patient motion. No obvious aneurysm or vascular occlusion. Extracranial MR angiography demonstrates right carotid bifurcation mild atherosclerotic disease without flow-limiting stenosis. Otherwise no significant stenosis. CTH: No acute intracranial hemorrhage, mass effect, or evidence of acute vascular territorial infarction. 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

## 2018-05-29 NOTE — PROGRESS NOTE ADULT - SUBJECTIVE AND OBJECTIVE BOX
INTERVAL HPI/OVERNIGHT EVENTS: no GI complaints, notes blood from forearm, dressing in place    MEDICATIONS  (STANDING):  aspirin enteric coated 81 milliGRAM(s) Oral daily  atorvastatin 80 milliGRAM(s) Oral at bedtime  buDESOnide  80 MICROgram(s)/formoterol 4.5 MICROgram(s) Inhaler 2 Puff(s) Inhalation two times a day  clopidogrel Tablet 75 milliGRAM(s) Oral daily  cyanocobalamin 500 MICROGram(s) Oral daily  enoxaparin Injectable 40 milliGRAM(s) SubCutaneous every 24 hours  finasteride 5 milliGRAM(s) Oral daily  folic acid 1 milliGRAM(s) Oral daily  levothyroxine 25 MICROGram(s) Oral daily  midodrine 10 milliGRAM(s) Oral three times a day  montelukast 10 milliGRAM(s) Oral daily  predniSONE   Tablet 10 milliGRAM(s) Oral daily  pyridoxine 100 milliGRAM(s) Oral daily  tiotropium 18 MICROgram(s) Capsule 1 Capsule(s) Inhalation daily    MEDICATIONS  (PRN):  ALBUTerol    90 MICROgram(s) HFA Inhaler 2 Puff(s) Inhalation every 6 hours PRN Shortness of Breath and/or Wheezing      Allergies    penicillin (Rash)    Intolerances            PHYSICAL EXAM:   Vital Signs:  Vital Signs Last 24 Hrs  T(C): 36.4 (29 May 2018 08:05), Max: 36.6 (28 May 2018 15:54)  T(F): 97.5 (29 May 2018 08:05), Max: 97.8 (28 May 2018 15:54)  HR: 74 (29 May 2018 08:05) (62 - 74)  BP: 115/64 (29 May 2018 08:05) (115/64 - 141/70)  BP(mean): --  RR: 18 (29 May 2018 08:05) (16 - 18)  SpO2: 97% (29 May 2018 08:05) (94% - 97%)  Daily     Daily     GENERAL:  no distress  HEENT:  NC/AT,  anicteric  CHEST:   no increased effort, breath sounds clear  HEART:  Regular rhythm  ABDOMEN:  Soft, non-tender, non-distended, normoactive bowel sounds,  no masses ,no hepato-splenomegaly, no signs of chronic liver disease  EXTEREMITIES:  no cyanosis RUE pressure dressing      LABS:            Urinalysis Basic - ( 28 May 2018 07:23 )    Color: Yellow / Appearance: Clear / S.016 / pH: x  Gluc: x / Ketone: Trace  / Bili: Negative / Urobili: Negative   Blood: x / Protein: Negative / Nitrite: Negative   Leuk Esterase: Negative / RBC: x / WBC x   Sq Epi: x / Non Sq Epi: x / Bacteria: x        RADIOLOGY & ADDITIONAL TESTS:

## 2018-05-29 NOTE — OCCUPATIONAL THERAPY INITIAL EVALUATION ADULT - ANTICIPATED DISCHARGE DISPOSITION, OT EVAL
Anticipate Home with home OT for home safety evaluation, functional mobility, balance, and ADLs. Home with supervision/assist as prior.

## 2018-05-29 NOTE — OCCUPATIONAL THERAPY INITIAL EVALUATION ADULT - PERTINENT HX OF CURRENT PROBLEM, REHAB EVAL
82 y/o M former smoker w/ hx of HTN, CVA, Seizures, RA, CAD s/p 6 stents, Esophageal CA s/p chemo/RT and esophagogastrectomy, WC bound presents with a few days double vision, dropping things from his R hand and feeliong like he's moving from side to side. Residual R visual field cut. BL needs assistance with ADLs. MR Angio: Imaging is slightly limited by motion through the brain. No acute or subacute infarction. Chronic bilateral PCA territory infarctions as described.

## 2018-05-29 NOTE — DISCHARGE NOTE ADULT - PLAN OF CARE
Treatment Continue medications as prescribed. Follow up with your neurologist in 1-2 weeks for further management. Follow up with your neurologist (Dr. Velasquez) in 1-2 weeks for further management.  Please follow up with [facility] at [address] within the next week. Their phone number is [phone].  We have decreased your Dilantin to 100mg by mouth twice a day, please continue to take it as such.  Please continue to take the rest of your home medications as prescribed. Follow up with your neurologist (Dr. Velasquez) in 1-2 weeks for further management.  Please follow up with the [facility] at [address] within the next week. Their phone number is [phone].  We have decreased your Dilantin to 100mg by mouth twice a day, please continue to take it as such.  Please continue to take the rest of your home medications as prescribed.  Please keep your wound and dressing dry until seen at the wound care center, in the meantime please have the visiting nurse service change the dressing with gauze and a ignacio as needed. Follow up with your neurologist (Dr. Velasquez) in 1-2 weeks for further management.  Please follow up with the [facility] at [address] within the next week. Their phone number is [phone].  We have decreased your Dilantin to 100mg by mouth three times a day, please continue to take it as such.  Please continue to take the rest of your home medications as prescribed.  Please keep your wound and dressing dry until seen at the wound care center, in the meantime please have the visiting nurse service change the dressing with gauze and a ignacio as needed.

## 2018-05-29 NOTE — DISCHARGE NOTE ADULT - MEDICATION SUMMARY - MEDICATIONS TO TAKE
I will START or STAY ON the medications listed below when I get home from the hospital:    finasteride 5 mg oral tablet  -- 1 tab(s) by mouth once a day  -- Indication: For BPH    predniSONE 5 mg oral tablet  -- 1 tab(s) by mouth once a day  -- Indication: For RA    aspirin 81 mg oral delayed release tablet  -- 1 tab(s) by mouth once a day  -- Indication: For Stroke    tamsulosin 0.4 mg oral capsule  -- 1 cap(s) by mouth once a day  -- Indication: For BPH    Dilantin 100 mg oral capsule, extended release  -- 1 cap(s) by mouth 2 times a day   -- Do not drink alcoholic beverages when taking this medication.  Do not take this drug if you are pregnant.  It is very important that you take or use this exactly as directed.  Do not skip doses or discontinue unless directed by your doctor.  May cause drowsiness.  Alcohol may intensify this effect.  Use care when operating dangerous machinery.  Obtain medical advice before taking any non-prescription drugs as some may affect the action of this medication.    -- Indication: For Seizures    amitriptyline 10 mg oral tablet  -- 1 tab(s) by mouth 2 times a day  -- Indication: For Anxiety    ezetimibe 10 mg oral tablet  -- 1 tab(s) by mouth once a day  -- Indication: For HLD    simvastatin 40 mg oral tablet  -- 1 tab(s) by mouth once a day  -- Indication: For HLD    clopidogrel 75 mg oral tablet  -- 1 tab(s) by mouth once a day MDD:1  -- Indication: For Stents    ALPRAZolam 0.25 mg oral tablet  -- 2 tab(s) by mouth 2 times a day  -- Indication: For Anxiety    Spiriva Respimat 2.5 mcg/inh inhalation aerosol  -- 1 or 2 puff(s) inhaled once a day, As Needed  -- Indication: For Asthma    ProAir HFA 90 mcg/inh inhalation aerosol  -- 2 puff(s) inhaled , As Needed  -- Indication: For Asthma    montelukast 10 mg oral tablet  -- 1 tab(s) by mouth once a day  -- Indication: For Asthma    midodrine 10 mg oral tablet  -- 1 tab(s) by mouth once a day  -- Indication: For BP    Saline Nasal Mist 0.65% nasal solution  -- 2 spray(s) into nose , As Needed  -- Indication: For Allergies    levothyroxine 25 mcg (0.025 mg) oral tablet  -- 1 tab(s) by mouth on ODD numbered days; and 2 tab(s) by mouth on EVEN numbered days  -- Indication: For Hypothyroidism    Mucinex DM  -- 1 dose(s) by mouth , As Needed  -- Indication: For Allergies    folic acid 0.4 mg oral tablet  -- Indication: For Supplement    Vitamin B6 100 mg oral tablet  -- Indication: For Supplement    Vitamin B12 500 mcg oral tablet  -- Indication: For Supplement I will START or STAY ON the medications listed below when I get home from the hospital:    finasteride 5 mg oral tablet  -- 1 tab(s) by mouth once a day  -- Indication: For BPH    predniSONE 5 mg oral tablet  -- 1 tab(s) by mouth once a day  -- Indication: For RA    aspirin 81 mg oral delayed release tablet  -- 1 tab(s) by mouth once a day  -- Indication: For Stroke    tamsulosin 0.4 mg oral capsule  -- 1 cap(s) by mouth once a day  -- Indication: For BPH    amitriptyline 10 mg oral tablet  -- 1 tab(s) by mouth 2 times a day  -- Indication: For Mood    ezetimibe 10 mg oral tablet  -- 1 tab(s) by mouth once a day  -- Indication: For HLD    simvastatin 40 mg oral tablet  -- 1 tab(s) by mouth once a day  -- Indication: For HLD    clopidogrel 75 mg oral tablet  -- 1 tab(s) by mouth once a day MDD:1  -- Indication: For Stroke    ALPRAZolam 0.25 mg oral tablet  -- 2 tab(s) by mouth 2 times a day  -- Indication: For Anxiety    Spiriva Respimat 2.5 mcg/inh inhalation aerosol  -- 1 or 2 puff(s) inhaled once a day, As Needed  -- Indication: For COPD    ProAir HFA 90 mcg/inh inhalation aerosol  -- 2 puff(s) inhaled , As Needed  -- Indication: For COPD    montelukast 10 mg oral tablet  -- 1 tab(s) by mouth once a day  -- Indication: For COPD    midodrine 10 mg oral tablet  -- 1 tab(s) by mouth once a day  -- Indication: For Hypotension    Saline Nasal Mist 0.65% nasal solution  -- 2 spray(s) into nose , As Needed  -- Indication: For Nasal congestion    levothyroxine 25 mcg (0.025 mg) oral tablet  -- 1 tab(s) by mouth on ODD numbered days; and 2 tab(s) by mouth on EVEN numbered days  -- Indication: For Hypothryoidism    Mucinex DM  -- 1 dose(s) by mouth , As Needed  -- Indication: For COPD    folic acid 0.4 mg oral tablet  -- Indication: For Supplement    Vitamin B6 100 mg oral tablet  -- Indication: For Supplement    Vitamin B12 500 mcg oral tablet  -- Indication: For Supplement

## 2018-05-29 NOTE — DISCHARGE NOTE ADULT - MEDICATION SUMMARY - MEDICATIONS TO CHANGE
I will SWITCH the dose or number of times a day I take the medications listed below when I get home from the hospital:    Dilantin 100 mg oral capsule  -- 2 cap(s) by mouth 2 times a day I will SWITCH the dose or number of times a day I take the medications listed below when I get home from the hospital:    phenytoin 200 mg oral capsule, extended release  -- 1 cap(s) by mouth 2 times a day

## 2018-05-31 ENCOUNTER — INPATIENT (INPATIENT)
Facility: HOSPITAL | Age: 83
LOS: 2 days | Discharge: HOME | DRG: 204 | End: 2018-06-03
Attending: INTERNAL MEDICINE | Admitting: INTERNAL MEDICINE
Payer: MEDICARE

## 2018-05-31 VITALS
HEART RATE: 60 BPM | RESPIRATION RATE: 22 BRPM | SYSTOLIC BLOOD PRESSURE: 93 MMHG | OXYGEN SATURATION: 93 % | DIASTOLIC BLOOD PRESSURE: 42 MMHG | TEMPERATURE: 98 F

## 2018-05-31 DIAGNOSIS — K91.89 OTHER POSTPROCEDURAL COMPLICATIONS AND DISORDERS OF DIGESTIVE SYSTEM: Chronic | ICD-10-CM

## 2018-05-31 DIAGNOSIS — Z95.5 PRESENCE OF CORONARY ANGIOPLASTY IMPLANT AND GRAFT: Chronic | ICD-10-CM

## 2018-05-31 DIAGNOSIS — Z96.649 PRESENCE OF UNSPECIFIED ARTIFICIAL HIP JOINT: Chronic | ICD-10-CM

## 2018-05-31 DIAGNOSIS — I44.1 ATRIOVENTRICULAR BLOCK, SECOND DEGREE: ICD-10-CM

## 2018-05-31 DIAGNOSIS — M12.9 ARTHROPATHY, UNSPECIFIED: Chronic | ICD-10-CM

## 2018-05-31 DIAGNOSIS — Z90.89 ACQUIRED ABSENCE OF OTHER ORGANS: Chronic | ICD-10-CM

## 2018-05-31 DIAGNOSIS — R04.2 HEMOPTYSIS: ICD-10-CM

## 2018-05-31 DIAGNOSIS — Z98.890 OTHER SPECIFIED POSTPROCEDURAL STATES: Chronic | ICD-10-CM

## 2018-05-31 LAB
ALBUMIN SERPL ELPH-MCNC: 3 G/DL — LOW (ref 3.3–5)
ALP SERPL-CCNC: 224 U/L — HIGH (ref 40–120)
ALT FLD-CCNC: 37 U/L — SIGNIFICANT CHANGE UP (ref 10–45)
ANION GAP SERPL CALC-SCNC: 9 MMOL/L — SIGNIFICANT CHANGE UP (ref 5–17)
APTT BLD: 30.6 SEC — SIGNIFICANT CHANGE UP (ref 27.5–37.4)
AST SERPL-CCNC: 52 U/L — HIGH (ref 10–40)
BASE EXCESS BLDV CALC-SCNC: 3.7 MMOL/L — HIGH (ref -2–2)
BASOPHILS # BLD AUTO: 0.1 K/UL — SIGNIFICANT CHANGE UP (ref 0–0.2)
BASOPHILS NFR BLD AUTO: 1 % — SIGNIFICANT CHANGE UP (ref 0–2)
BILIRUB SERPL-MCNC: 0.3 MG/DL — SIGNIFICANT CHANGE UP (ref 0.2–1.2)
BLD GP AB SCN SERPL QL: NEGATIVE — SIGNIFICANT CHANGE UP
BUN SERPL-MCNC: 16 MG/DL — SIGNIFICANT CHANGE UP (ref 7–23)
CA-I SERPL-SCNC: 1.28 MMOL/L — SIGNIFICANT CHANGE UP (ref 1.12–1.3)
CALCIUM SERPL-MCNC: 9.1 MG/DL — SIGNIFICANT CHANGE UP (ref 8.4–10.5)
CHLORIDE BLDV-SCNC: 105 MMOL/L — SIGNIFICANT CHANGE UP (ref 96–108)
CHLORIDE SERPL-SCNC: 104 MMOL/L — SIGNIFICANT CHANGE UP (ref 96–108)
CO2 BLDV-SCNC: 31 MMOL/L — HIGH (ref 22–30)
CO2 SERPL-SCNC: 28 MMOL/L — SIGNIFICANT CHANGE UP (ref 22–31)
CREAT SERPL-MCNC: 0.65 MG/DL — SIGNIFICANT CHANGE UP (ref 0.5–1.3)
EOSINOPHIL # BLD AUTO: 0.1 K/UL — SIGNIFICANT CHANGE UP (ref 0–0.5)
EOSINOPHIL NFR BLD AUTO: 1.8 % — SIGNIFICANT CHANGE UP (ref 0–6)
GAS PNL BLDV: 140 MMOL/L — SIGNIFICANT CHANGE UP (ref 136–145)
GAS PNL BLDV: SIGNIFICANT CHANGE UP
GAS PNL BLDV: SIGNIFICANT CHANGE UP
GLUCOSE BLDV-MCNC: 94 MG/DL — SIGNIFICANT CHANGE UP (ref 70–99)
GLUCOSE SERPL-MCNC: 86 MG/DL — SIGNIFICANT CHANGE UP (ref 70–99)
HCO3 BLDV-SCNC: 30 MMOL/L — HIGH (ref 21–29)
HCT VFR BLD CALC: 26.6 % — LOW (ref 39–50)
HCT VFR BLD CALC: 30.9 % — LOW (ref 39–50)
HCT VFR BLDA CALC: 31 % — LOW (ref 39–50)
HGB BLD CALC-MCNC: 10 G/DL — LOW (ref 13–17)
HGB BLD-MCNC: 10.4 G/DL — LOW (ref 13–17)
HGB BLD-MCNC: 8.8 G/DL — LOW (ref 13–17)
INR BLD: 1.09 RATIO — SIGNIFICANT CHANGE UP (ref 0.88–1.16)
LACTATE BLDV-MCNC: 1.1 MMOL/L — SIGNIFICANT CHANGE UP (ref 0.7–2)
LYMPHOCYTES # BLD AUTO: 0.6 K/UL — LOW (ref 1–3.3)
LYMPHOCYTES # BLD AUTO: 8.7 % — LOW (ref 13–44)
MCHC RBC-ENTMCNC: 31.5 PG — SIGNIFICANT CHANGE UP (ref 27–34)
MCHC RBC-ENTMCNC: 33.1 GM/DL — SIGNIFICANT CHANGE UP (ref 32–36)
MCHC RBC-ENTMCNC: 33.1 PG — SIGNIFICANT CHANGE UP (ref 27–34)
MCHC RBC-ENTMCNC: 33.6 GM/DL — SIGNIFICANT CHANGE UP (ref 32–36)
MCV RBC AUTO: 95.3 FL — SIGNIFICANT CHANGE UP (ref 80–100)
MCV RBC AUTO: 98.6 FL — SIGNIFICANT CHANGE UP (ref 80–100)
MONOCYTES # BLD AUTO: 0.4 K/UL — SIGNIFICANT CHANGE UP (ref 0–0.9)
MONOCYTES NFR BLD AUTO: 6.4 % — SIGNIFICANT CHANGE UP (ref 2–14)
NEUTROPHILS # BLD AUTO: 5.3 K/UL — SIGNIFICANT CHANGE UP (ref 1.8–7.4)
NEUTROPHILS NFR BLD AUTO: 82.2 % — HIGH (ref 43–77)
PCO2 BLDV: 54 MMHG — HIGH (ref 35–50)
PH BLDV: 7.36 — SIGNIFICANT CHANGE UP (ref 7.35–7.45)
PHENYTOIN FREE SERPL-MCNC: 14.1 UG/ML — SIGNIFICANT CHANGE UP (ref 10–20)
PLATELET # BLD AUTO: 164 K/UL — SIGNIFICANT CHANGE UP (ref 150–400)
PLATELET # BLD AUTO: 186 K/UL — SIGNIFICANT CHANGE UP (ref 150–400)
PO2 BLDV: 21 MMHG — LOW (ref 25–45)
POTASSIUM BLDV-SCNC: 3.8 MMOL/L — SIGNIFICANT CHANGE UP (ref 3.5–5)
POTASSIUM SERPL-MCNC: 4.3 MMOL/L — SIGNIFICANT CHANGE UP (ref 3.5–5.3)
POTASSIUM SERPL-SCNC: 4.3 MMOL/L — SIGNIFICANT CHANGE UP (ref 3.5–5.3)
PROT SERPL-MCNC: 6.6 G/DL — SIGNIFICANT CHANGE UP (ref 6–8.3)
PROTHROM AB SERPL-ACNC: 11.8 SEC — SIGNIFICANT CHANGE UP (ref 9.8–12.7)
RBC # BLD: 2.79 M/UL — LOW (ref 4.2–5.8)
RBC # BLD: 3.13 M/UL — LOW (ref 4.2–5.8)
RBC # FLD: 15.6 % — HIGH (ref 10.3–14.5)
RBC # FLD: 17.5 % — HIGH (ref 10.3–14.5)
RH IG SCN BLD-IMP: POSITIVE — SIGNIFICANT CHANGE UP
SAO2 % BLDV: 27 % — LOW (ref 67–88)
SODIUM SERPL-SCNC: 141 MMOL/L — SIGNIFICANT CHANGE UP (ref 135–145)
WBC # BLD: 4.97 K/UL — SIGNIFICANT CHANGE UP (ref 3.8–10.5)
WBC # BLD: 6.5 K/UL — SIGNIFICANT CHANGE UP (ref 3.8–10.5)
WBC # FLD AUTO: 4.97 K/UL — SIGNIFICANT CHANGE UP (ref 3.8–10.5)
WBC # FLD AUTO: 6.5 K/UL — SIGNIFICANT CHANGE UP (ref 3.8–10.5)

## 2018-05-31 PROCEDURE — 93971 EXTREMITY STUDY: CPT | Mod: 26

## 2018-05-31 PROCEDURE — 71045 X-RAY EXAM CHEST 1 VIEW: CPT | Mod: 26

## 2018-05-31 PROCEDURE — 99285 EMERGENCY DEPT VISIT HI MDM: CPT | Mod: 25,GC

## 2018-05-31 PROCEDURE — 99221 1ST HOSP IP/OBS SF/LOW 40: CPT | Mod: 25

## 2018-05-31 PROCEDURE — 99223 1ST HOSP IP/OBS HIGH 75: CPT | Mod: GC

## 2018-05-31 PROCEDURE — 93010 ELECTROCARDIOGRAM REPORT: CPT

## 2018-05-31 PROCEDURE — 30901 CONTROL OF NOSEBLEED: CPT

## 2018-05-31 PROCEDURE — 71275 CT ANGIOGRAPHY CHEST: CPT | Mod: 26

## 2018-05-31 PROCEDURE — 31575 DIAGNOSTIC LARYNGOSCOPY: CPT

## 2018-05-31 PROCEDURE — 74174 CTA ABD&PLVS W/CONTRAST: CPT | Mod: 26

## 2018-05-31 RX ORDER — SIMVASTATIN 20 MG/1
40 TABLET, FILM COATED ORAL AT BEDTIME
Qty: 0 | Refills: 0 | Status: DISCONTINUED | OUTPATIENT
Start: 2018-05-31 | End: 2018-06-03

## 2018-05-31 RX ORDER — MONTELUKAST 4 MG/1
10 TABLET, CHEWABLE ORAL DAILY
Qty: 0 | Refills: 0 | Status: DISCONTINUED | OUTPATIENT
Start: 2018-05-31 | End: 2018-06-03

## 2018-05-31 RX ORDER — CLOPIDOGREL BISULFATE 75 MG/1
75 TABLET, FILM COATED ORAL DAILY
Qty: 0 | Refills: 0 | Status: DISCONTINUED | OUTPATIENT
Start: 2018-05-31 | End: 2018-06-03

## 2018-05-31 RX ORDER — PREGABALIN 225 MG/1
500 CAPSULE ORAL DAILY
Qty: 0 | Refills: 0 | Status: DISCONTINUED | OUTPATIENT
Start: 2018-05-31 | End: 2018-06-03

## 2018-05-31 RX ORDER — ASPIRIN/CALCIUM CARB/MAGNESIUM 324 MG
81 TABLET ORAL DAILY
Qty: 0 | Refills: 0 | Status: DISCONTINUED | OUTPATIENT
Start: 2018-05-31 | End: 2018-06-03

## 2018-05-31 RX ORDER — ALBUTEROL 90 UG/1
1 AEROSOL, METERED ORAL DAILY
Qty: 0 | Refills: 0 | Status: DISCONTINUED | OUTPATIENT
Start: 2018-05-31 | End: 2018-06-03

## 2018-05-31 RX ORDER — TIOTROPIUM BROMIDE 18 UG/1
2.5 CAPSULE ORAL; RESPIRATORY (INHALATION) DAILY
Qty: 0 | Refills: 0 | Status: DISCONTINUED | OUTPATIENT
Start: 2018-05-31 | End: 2018-05-31

## 2018-05-31 RX ORDER — HYDROCORTISONE 20 MG
100 TABLET ORAL ONCE
Qty: 0 | Refills: 0 | Status: DISCONTINUED | OUTPATIENT
Start: 2018-05-31 | End: 2018-05-31

## 2018-05-31 RX ORDER — TAMSULOSIN HYDROCHLORIDE 0.4 MG/1
0.4 CAPSULE ORAL AT BEDTIME
Qty: 0 | Refills: 0 | Status: DISCONTINUED | OUTPATIENT
Start: 2018-05-31 | End: 2018-06-03

## 2018-05-31 RX ORDER — SODIUM CHLORIDE 9 MG/ML
500 INJECTION INTRAMUSCULAR; INTRAVENOUS; SUBCUTANEOUS ONCE
Qty: 0 | Refills: 0 | Status: COMPLETED | OUTPATIENT
Start: 2018-05-31 | End: 2018-05-31

## 2018-05-31 RX ORDER — AMITRIPTYLINE HCL 25 MG
10 TABLET ORAL
Qty: 0 | Refills: 0 | Status: DISCONTINUED | OUTPATIENT
Start: 2018-05-31 | End: 2018-06-03

## 2018-05-31 RX ORDER — MIDODRINE HYDROCHLORIDE 2.5 MG/1
10 TABLET ORAL ONCE
Qty: 0 | Refills: 0 | Status: COMPLETED | OUTPATIENT
Start: 2018-05-31 | End: 2018-05-31

## 2018-05-31 RX ORDER — LEVOTHYROXINE SODIUM 125 MCG
25 TABLET ORAL DAILY
Qty: 0 | Refills: 0 | Status: DISCONTINUED | OUTPATIENT
Start: 2018-05-31 | End: 2018-06-01

## 2018-05-31 RX ORDER — PYRIDOXINE HCL (VITAMIN B6) 100 MG
100 TABLET ORAL DAILY
Qty: 0 | Refills: 0 | Status: DISCONTINUED | OUTPATIENT
Start: 2018-05-31 | End: 2018-06-03

## 2018-05-31 RX ORDER — MIDODRINE HYDROCHLORIDE 2.5 MG/1
10 TABLET ORAL THREE TIMES A DAY
Qty: 0 | Refills: 0 | Status: DISCONTINUED | OUTPATIENT
Start: 2018-05-31 | End: 2018-06-03

## 2018-05-31 RX ORDER — TIOTROPIUM BROMIDE 18 UG/1
1 CAPSULE ORAL; RESPIRATORY (INHALATION) DAILY
Qty: 0 | Refills: 0 | Status: DISCONTINUED | OUTPATIENT
Start: 2018-05-31 | End: 2018-06-01

## 2018-05-31 RX ORDER — FINASTERIDE 5 MG/1
5 TABLET, FILM COATED ORAL DAILY
Qty: 0 | Refills: 0 | Status: DISCONTINUED | OUTPATIENT
Start: 2018-05-31 | End: 2018-06-03

## 2018-05-31 RX ORDER — FOLIC ACID 0.8 MG
1 TABLET ORAL DAILY
Qty: 0 | Refills: 0 | Status: DISCONTINUED | OUTPATIENT
Start: 2018-05-31 | End: 2018-06-03

## 2018-05-31 RX ORDER — POLYETHYLENE GLYCOL 3350 17 G/17G
17 POWDER, FOR SOLUTION ORAL AT BEDTIME
Qty: 0 | Refills: 0 | Status: DISCONTINUED | OUTPATIENT
Start: 2018-05-31 | End: 2018-06-03

## 2018-05-31 RX ADMIN — POLYETHYLENE GLYCOL 3350 17 GRAM(S): 17 POWDER, FOR SOLUTION ORAL at 22:43

## 2018-05-31 RX ADMIN — Medication 10 MILLIGRAM(S): at 18:39

## 2018-05-31 RX ADMIN — SODIUM CHLORIDE 500 MILLILITER(S): 9 INJECTION INTRAMUSCULAR; INTRAVENOUS; SUBCUTANEOUS at 11:14

## 2018-05-31 RX ADMIN — TAMSULOSIN HYDROCHLORIDE 0.4 MILLIGRAM(S): 0.4 CAPSULE ORAL at 22:43

## 2018-05-31 RX ADMIN — Medication 100 MILLIGRAM(S): at 22:42

## 2018-05-31 RX ADMIN — MIDODRINE HYDROCHLORIDE 10 MILLIGRAM(S): 2.5 TABLET ORAL at 11:14

## 2018-05-31 RX ADMIN — MONTELUKAST 10 MILLIGRAM(S): 4 TABLET, CHEWABLE ORAL at 22:42

## 2018-05-31 RX ADMIN — SIMVASTATIN 40 MILLIGRAM(S): 20 TABLET, FILM COATED ORAL at 22:43

## 2018-05-31 NOTE — CONSULT NOTE ADULT - SUBJECTIVE AND OBJECTIVE BOX
HPI:  CHIEF COMPLAINT:Patient is a 82y old  Male who presents with a chief complaint of hemoptysis.    HP  I:83yo male HTN, CVA, seizures on dilantin, RA, CAD s/p 6 stents,  Esophageal CA s/p chemo/RT and esophagogastrectomy, wheelchair bound   p/w hemoptysis since this morning. Pt. and wife reports occasional clots, and blood streaked sputum. Deny fevers, n/v, abdominal pain, anticoagulant use, melena/hematochezia. Pt. on asa and plavix for stents. Pt. discharged from hospital 2 days ago after stay for supratherapeutic dilantin levels.      PAST MEDICAL & SURGICAL HISTORY:  Coronary artery disease: s/p 3 stents on June 30, 2017 at [place] Dr. Lavelle Reid  OA (osteoarthritis) of knee: right  Former smoker, stopped smoking in distant past  Rheumatoid arthritis  Seizure disorder: 1 episode approximately 15 yrs ago  Asthma  Hyperlipidemia  BPH (benign prostatic hyperplasia)  HTN (hypertension)  Esophagus cancer  Chronic allergic rhinitis  BCC (basal cell carcinoma): skin  Cerebrovascular accident (CVA)  Anxiety  Anastomotic leak following esophagectomy: Thurmond logan esophagectomy  History of tonsillectomy  H/O heart artery stent: June 30, 2017  Knee arthropathy: left  History of total hip replacement: left  History of hernia repair      MEDICATIONS  (STANDING):  hydrocortisone sodium succinate Injectable 100 milliGRAM(s) IV Push Once    MEDICATIONS  (PRN):      FAMILY HISTORY:  Family history of heart attack  Family history of pulmonary embolism: father @ 49 yr old  FH: CAD (coronary artery disease) (Sibling)      SOCIAL HISTORY:    [ ] Non-smoker  [ ] Smoker  [ ] Alcohol    Allergies    penicillin (Rash)    Intolerances    	    REVIEW OF SYSTEMS:  CONSTITUTIONAL: No fever, weight loss, or fatigue  EYES: No eye pain, visual disturbances, or discharge  ENT:  No difficulty hearing, tinnitus, vertigo; No sinus or throat pain  NECK: No pain or stiffness  RESPIRATORY: No cough, wheezing, chills ? hemoptysis; No Shortness of Breath  CARDIOVASCULAR: No chest pain, palpitations, passing out, dizziness, or leg swelling  GASTROINTESTINAL: No abdominal or epigastric pain. No nausea, vomiting, or hematemesis; No diarrhea or constipation. No melena or hematochezia.  GENITOURINARY: No dysuria, frequency, hematuria, or incontinence  NEUROLOGICAL: No headaches, memory loss, loss of strength, numbness, or tremors  SKIN: No itching, burning, rashes, or lesions   LYMPH Nodes: No enlarged glands  ENDOCRINE: No heat or cold intolerance; No hair loss  MUSCULOSKELETAL: No joint pain or swelling; No muscle, back, or extremity pain  PSYCHIATRIC: No depression, anxiety, mood swings, or difficulty sleeping  HEME/LYMPH: No easy bruising, or bleeding gums  ALLERGY AND IMMUNOLOGIC: No hives or eczema	    [ ] All others negative	  [ ] Unable to obtain    PHYSICAL EXAM:  T(C): 36.4 (05-31-18 @ 10:07), Max: 36.4 (05-31-18 @ 09:54)  HR: 65 (05-31-18 @ 13:05) (60 - 84)  BP: 105/62 (05-31-18 @ 13:05) (93/42 - 112/63)  RR: 18 (05-31-18 @ 13:05) (18 - 22)  SpO2: 95% (05-31-18 @ 13:05) (93% - 99%)  Wt(kg): --  I&O's Summary      Appearance: Normal	  HEENT:   Normal oral mucosa, PERRL, EOMI	  Lymphatic: No lymphadenopathy  Cardiovascular: Normal S1 S2, No JVD, + murmurs, No edema  Respiratory: Lungs clear to auscultation	  Psychiatry: A & O x 3, Mood & affect appropriate  Gastrointestinal:  Soft, Non-tender, + BS	  Skin: No rashes, No ecchymoses, No cyanosis	  Neurologic: Non-focal  Extremities: Normal range of motion, No clubbing, cyanosis or edema  Vascular: Peripheral pulses palpable 2+ bilaterally    TELEMETRY: 	    ECG:  	  RADIOLOGY:  OTHER: 	  	  LABS:	 	    CARDIAC MARKERS:  CARDIAC MARKERS ( 31 May 2018 10:33 )  x     / 0.08 ng/mL / 106 U/L / x     / 11.3 ng/mL                              10.4   6.5   )-----------( 186      ( 31 May 2018 10:33 )             30.9     05-31    141  |  104  |  16  ----------------------------<  86  4.3   |  28  |  0.65    Ca    9.1      31 May 2018 10:33  Phos  3.2     05-31  Mg     1.7     05-31    TPro  6.6  /  Alb  3.0<L>  /  TBili  0.3  /  DBili  x   /  AST  52<H>  /  ALT  37  /  AlkPhos  224<H>  05-31    proBNP:   Lipid Profile:   HgA1c:   TSH:   PT/INR - ( 31 May 2018 10:33 )   PT: 11.8 sec;   INR: 1.09 ratio         PTT - ( 31 May 2018 10:33 )  PTT:30.6 sec    PREVIOUS DIAGNOSTIC TESTING:      < from: Transthoracic Echocardiogram (04.21.18 @ 14:23) >  1. Mitral annular calcification, otherwise normal mitral  valve. Mild mitral regurgitation. Mean transmitral valve  gradient equals 4 mm Hg, consistent with mild mitral  stenosis. (HRabout 60s bpm)  2. Calcified trileaflet aortic valve with decreased  opening. Peak transaortic valve gradient equals 34 mm Hg,  mean transaortic valve gradient equals 19 mm Hg, estimated  aortic valve area equals 1.3 sqcm (by continuity equation),  aortic valve velocity time integral equals 59 cm,  consistent with moderate aortic stenosis. Aortic valve  assessment is limited by the presence of frequent PVCs and  the degree of aortic stenosis may be underestimated.  Visually, the aortic valve appears moderate-severely  stenotic. Consider additional imaging of the aortic valve  such as with additional TTE or TINO imaging if clinically  indicated.No aortic valve regurgitation seen.  3. Severely dilated left atrium.  LA volume index = 64  cc/m2.  4. Endocardium not well visualized; grossly normal left  ventricular systolic function. Septal motion consistent  with conduction defect. There is a false tendon in the LV  cavity (normal variant).  5. The right ventricle is not well visualized; right  ventricle appears enlarged with normal systolic function. (31 May 2018 14:01)      REVIEW OF SYSTEMS:  CONSTITUTIONAL: No weakness,  no   fevers      RESPIRATORY:  No    shortness of breath  , no  wheezing  CARDIOVASCULAR: No chest pain,   no  palpitations, no  cough  GASTROINTESTINAL: No abdominal  pain, no  vomiting, no  diarrhea  NEUROLOGICAL: No  focal  weakness    Allergies    penicillin (Rash)    Intolerances        CAPILLARY BLOOD GLUCOSE      POCT Blood Glucose.: 78 mg/dL (31 May 2018 10:06)    I&O's Summary      Vital Signs Last 24 Hrs  T(C): 36.4 (31 May 2018 10:07), Max: 36.4 (31 May 2018 09:54)  T(F): 97.5 (31 May 2018 10:07), Max: 97.5 (31 May 2018 09:54)  HR: 85 (31 May 2018 14:45) (60 - 85)  BP: 114/68 (31 May 2018 14:45) (93/42 - 114/68)  BP(mean): --  RR: 20 (31 May 2018 14:45) (18 - 22)  SpO2: 96% (31 May 2018 14:45) (93% - 99%)  PHYSICAL EXAM:  GENERAL: NAD,   HEAD:  Atraumatic, Normocephalic  EYES: EOMI,  NECK: Supple, No JVD  CHEST/LUNG: Clear to auscultation bilaterally; No wheeze  HEART: Regular rate and rhythm;        murmur  ABDOMEN: Soft, Nontender,   EXTREMITIES:         edema  NEUROLOGY:  alert    MEDICATIONS  (STANDING):  amitriptyline 10 milliGRAM(s) Oral two times a day  aspirin enteric coated 81 milliGRAM(s) Oral daily  clopidogrel Tablet 75 milliGRAM(s) Oral daily  cyanocobalamin 500 MICROGram(s) Oral daily  finasteride 5 milliGRAM(s) Oral daily  folic acid 1 milliGRAM(s) Oral daily  levothyroxine 25 MICROGram(s) Oral daily  midodrine 10 milliGRAM(s) Oral three times a day  montelukast 10 milliGRAM(s) Oral daily  phenytoin   Capsule 100 milliGRAM(s) Oral three times a day  polyethylene glycol 3350 17 Gram(s) Oral at bedtime  pyridoxine 100 milliGRAM(s) Oral daily  simvastatin 40 milliGRAM(s) Oral at bedtime  tamsulosin 0.4 milliGRAM(s) Oral at bedtime  tiotropium 18 MICROgram(s) Capsule 1 Capsule(s) Inhalation daily    MEDICATIONS  (PRN):  ALBUTerol    90 MICROgram(s) HFA Inhaler 1 Puff(s) Inhalation daily PRN Bronchospasm    LABS:                        10.4   6.5   )-----------( 186      ( 31 May 2018 10:33 )             30.9     05-31    141  |  104  |  16  ----------------------------<  86  4.3   |  28  |  0.65    Ca    9.1      31 May 2018 10:33  Phos  3.2     05-31  Mg     1.7     05-31    TPro  6.6  /  Alb  3.0<L>  /  TBili  0.3  /  DBili  x   /  AST  52<H>  /  ALT  37  /  AlkPhos  224<H>  05-31    PT/INR - ( 31 May 2018 10:33 )   PT: 11.8 sec;   INR: 1.09 ratio         PTT - ( 31 May 2018 10:33 )  PTT:30.6 sec  CARDIAC MARKERS ( 31 May 2018 10:33 )  x     / 0.08 ng/mL / 106 U/L / x     / 11.3 ng/mL            05-31 @ 10:33  3.8  21    Hemoglobin A1C, Whole Blood: 4.7 % (05-27 @ 12:16)    Thyroid Stimulating Hormone, Serum: 10.92 uIU/mL (04-29 @ 08:03)          Consultant(s) Notes Reviewed:      Care Discussed with Consultants/Other Providers:  Plan:

## 2018-05-31 NOTE — H&P ADULT - HISTORY OF PRESENT ILLNESS
CHIEF COMPLAINT:Patient is a 82y old  Male who presents with a chief complaint of hemoptysis.    HPI:83yo male HTN, CVA, seizures on dilantin, RA, CAD s/p 6 stents, Esophageal CA s/p chemo/RT and esophagogastrectomy, wheelchair bound p/w hemoptysis since this morning. Pt. and wife reports occasional clots, and blood streaked sputum. Deny fevers, n/v, abdominal pain, anticoagulant use, melena/hematochezia. Pt. on asa and plavix for stents. Pt. discharged from hospital 2 days ago after stay for supratherapeutic dilantin levels.      PAST MEDICAL & SURGICAL HISTORY:  Coronary artery disease: s/p 3 stents on June 30, 2017 at [place] Dr. Lavelle Reid  OA (osteoarthritis) of knee: right  Former smoker, stopped smoking in distant past  Rheumatoid arthritis  Seizure disorder: 1 episode approximately 15 yrs ago  Asthma  Hyperlipidemia  BPH (benign prostatic hyperplasia)  HTN (hypertension)  Esophagus cancer  Chronic allergic rhinitis  BCC (basal cell carcinoma): skin  Cerebrovascular accident (CVA)  Anxiety  Anastomotic leak following esophagectomy: [place] [facility] esophagectomy  History of tonsillectomy  H/O heart artery stent: June 30, 2017  Knee arthropathy: left  History of total hip replacement: left  History of hernia repair      MEDICATIONS  (STANDING):  hydrocortisone sodium succinate Injectable 100 milliGRAM(s) IV Push Once    MEDICATIONS  (PRN):      FAMILY HISTORY:  Family history of heart attack  Family history of pulmonary embolism: father @ 49 yr old  FH: CAD (coronary artery disease) (Sibling)      SOCIAL HISTORY:    [ ] Non-smoker  [ ] Smoker  [ ] Alcohol    Allergies    penicillin (Rash)    Intolerances    	    REVIEW OF SYSTEMS:  CONSTITUTIONAL: No fever, weight loss, or fatigue  EYES: No eye pain, visual disturbances, or discharge  ENT:  No difficulty hearing, tinnitus, vertigo; No sinus or throat pain  NECK: No pain or stiffness  RESPIRATORY: No cough, wheezing, chills ? hemoptysis; No Shortness of Breath  CARDIOVASCULAR: No chest pain, palpitations, passing out, dizziness, or leg swelling  GASTROINTESTINAL: No abdominal or epigastric pain. No nausea, vomiting, or hematemesis; No diarrhea or constipation. No melena or hematochezia.  GENITOURINARY: No dysuria, frequency, hematuria, or incontinence  NEUROLOGICAL: No headaches, memory loss, loss of strength, numbness, or tremors  SKIN: No itching, burning, rashes, or lesions   LYMPH Nodes: No enlarged glands  ENDOCRINE: No heat or cold intolerance; No hair loss  MUSCULOSKELETAL: No joint pain or swelling; No muscle, back, or extremity pain  PSYCHIATRIC: No depression, anxiety, mood swings, or difficulty sleeping  HEME/LYMPH: No easy bruising, or bleeding gums  ALLERGY AND IMMUNOLOGIC: No hives or eczema	    [ ] All others negative	  [ ] Unable to obtain    PHYSICAL EXAM:  T(C): 36.4 (05-31-18 @ 10:07), Max: 36.4 (05-31-18 @ 09:54)  HR: 65 (05-31-18 @ 13:05) (60 - 84)  BP: 105/62 (05-31-18 @ 13:05) (93/42 - 112/63)  RR: 18 (05-31-18 @ 13:05) (18 - 22)  SpO2: 95% (05-31-18 @ 13:05) (93% - 99%)  Wt(kg): --  I&O's Summary      Appearance: Normal	  HEENT:   Normal oral mucosa, PERRL, EOMI	  Lymphatic: No lymphadenopathy  Cardiovascular: Normal S1 S2, No JVD, + murmurs, No edema  Respiratory: Lungs clear to auscultation	  Psychiatry: A & O x 3, Mood & affect appropriate  Gastrointestinal:  Soft, Non-tender, + BS	  Skin: No rashes, No ecchymoses, No cyanosis	  Neurologic: Non-focal  Extremities: Normal range of motion, No clubbing, cyanosis or edema  Vascular: Peripheral pulses palpable 2+ bilaterally    TELEMETRY: 	    ECG:  	  RADIOLOGY:  OTHER: 	  	  LABS:	 	    CARDIAC MARKERS:  CARDIAC MARKERS ( 31 May 2018 10:33 )  x     / 0.08 ng/mL / 106 U/L / x     / 11.3 ng/mL                              10.4   6.5   )-----------( 186      ( 31 May 2018 10:33 )             30.9     05-31    141  |  104  |  16  ----------------------------<  86  4.3   |  28  |  0.65    Ca    9.1      31 May 2018 10:33  Phos  3.2     05-31  Mg     1.7     05-31    TPro  6.6  /  Alb  3.0<L>  /  TBili  0.3  /  DBili  x   /  AST  52<H>  /  ALT  37  /  AlkPhos  224<H>  05-31    proBNP:   Lipid Profile:   HgA1c:   TSH:   PT/INR - ( 31 May 2018 10:33 )   PT: 11.8 sec;   INR: 1.09 ratio         PTT - ( 31 May 2018 10:33 )  PTT:30.6 sec    PREVIOUS DIAGNOSTIC TESTING:      < from: Transthoracic Echocardiogram (04.21.18 @ 14:23) >  1. Mitral annular calcification, otherwise normal mitral  valve. Mild mitral regurgitation. Mean transmitral valve  gradient equals 4 mm Hg, consistent with mild mitral  stenosis. (HRabout 60s bpm)  2. Calcified trileaflet aortic valve with decreased  opening. Peak transaortic valve gradient equals 34 mm Hg,  mean transaortic valve gradient equals 19 mm Hg, estimated  aortic valve area equals 1.3 sqcm (by continuity equation),  aortic valve velocity time integral equals 59 cm,  consistent with moderate aortic stenosis. Aortic valve  assessment is limited by the presence of frequent PVCs and  the degree of aortic stenosis may be underestimated.  Visually, the aortic valve appears moderate-severely  stenotic. Consider additional imaging of the aortic valve  such as with additional TTE or TINO imaging if clinically  indicated.No aortic valve regurgitation seen.  3. Severely dilated left atrium.  LA volume index = 64  cc/m2.  4. Endocardium not well visualized; grossly normal left  ventricular systolic function. Septal motion consistent  with conduction defect. There is a false tendon in the LV  cavity (normal variant).  5. The right ventricle is not well visualized; right  ventricle appears enlarged with normal systolic function.

## 2018-05-31 NOTE — CONSULT NOTE ADULT - ASSESSMENT
pt  with  h/o htn, cad, stents, br  asthma, seizure, cva , ca esophagus,  c/c  cachexia, RA  on prednisone   s./p   cath,   with rico, to  LAD and angioplasty, D1, ON 4/17,  and  since then, had  low  bp,, and  was  on midodrine,  h/o  anemia ,   on c/c . prednisone,, for  RA,   c/.c anemia    echo  with modertae AS. / ?  severe   asa/ plavix   given recent pci  per  endo,/dr cruz,  pt  does  not have adrenal insufficiency   now with hemoptysis, ct scans, no lesions, ent eval    follow hb  gi  d r neetu  per  card, pt does not need  any  intervention

## 2018-05-31 NOTE — CONSULT NOTE ADULT - ASSESSMENT
82M with h/o HTN, Seizure d/o, Rheumatoid arthritis, Asthma, CAD s/p PCI x 5 in the past with last two stents in 4/2018 on ASA/Plavix, Esophageal CA s/p esophagectomy in 8/14/2017 presents with multiple episodes of hemoptysis.    - Patient is hemodynamically stable with hemoglobin in his range.  - Given recent drug eluting stents, patient should remain on ASA/Plavix for now.  - Continue to monitor counts and watch for further episodes of hemoptysis  - Recommend Hycodan 5 mL PO q 6 hours to suppress cough.  - Continue Spiriva and Duonebs q 6 prn.  - Recommend Acapella device to bedside to facilitate mucous clearance.  - No indication for bronchoscopy at this time.

## 2018-05-31 NOTE — ED PROVIDER NOTE - PROGRESS NOTE DETAILS
Patient with AV block on telemonitoring, appears to be Mobitz II.  Not evident on repeat EKG.  Normal heart rate, BP improved with home AM midodrine dose.  Pt resting comfortably.  Cardiology paged.     BETHANIE Blank MD Patient with AV block on telemonitoring, appears to be Mobitz I.  Not evident on repeat EKG.  Normal heart rate, BP improved with home AM midodrine dose.  Pt resting comfortably.  Cardiology paged.     BETHANIE Blank MD Case d/w with Dr. Kamlesh Hinton of cardiology and dr. adam esqueda, aware of AV block.  Pt HD stable.  CTA negative.  Requesting tele admission under Dr. Hinton.

## 2018-05-31 NOTE — ED ADULT NURSE NOTE - OBJECTIVE STATEMENT
81 y/o male hx esophogeal CA, HTN, CVA, seizures, CAD with 6 stents presents to the ED via EMS c/o bloody sputum since this AM. As per family, clots, bright red blood streaked in cough. Recent admission for abnormal dilantin levels. On ASA and Plavix. Denies weakness, lightheadedness, dizziness, n/v, fever, SOB, diff breathing, CP, diarrhea, constipation, n/v, night sweats. PT endorsing slight epigastric pain, abdomen soft, nontender, nondistended, EKG completed, CM applied- NSR, pt resting comfortably with wife at bedside.

## 2018-05-31 NOTE — CONSULT NOTE ADULT - SUBJECTIVE AND OBJECTIVE BOX
CC: hemoptysis    HPI: 81 y/o male HTN, CVA, seizures on dilantin, RA, CAD s/p 6 stents (most recent 4/2018), Esophageal CA s/p chemo/RT and esophagogastrectomy (8/14/2017), wheelchair bound p/w hemoptysis since this morning. Reports more mucus mixed with bright red blood and clot.  Pt. and wife reports occasional clots, and blood streaked sputum. Deny fevers, n/v, abdominal pain, anticoagulant use, melena/hematochezia. Pt. on asa and plavix for stents. Denies dizziness, SOB. Patient has a chronic productive cough with white to yellow phlegm and is on Spiriva and Symbicort. He has a chronic post nasal drip and occasionally has nose bleeds but none in the past year.        PAST MEDICAL & SURGICAL HISTORY:  Coronary artery disease: s/p 3 stents on June 30, 2017 at [place] Dr. 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  History of tonsillectomy  H/O heart artery stent: June 30, 2017  Knee arthropathy: left  History of total hip replacement: left  History of hernia repair    Allergies    penicillin (Rash)    Intolerances      MEDICATIONS  (STANDING):  amitriptyline 10 milliGRAM(s) Oral two times a day  aspirin enteric coated 81 milliGRAM(s) Oral daily  clopidogrel Tablet 75 milliGRAM(s) Oral daily  cyanocobalamin 500 MICROGram(s) Oral daily  finasteride 5 milliGRAM(s) Oral daily  folic acid 1 milliGRAM(s) Oral daily  levothyroxine 25 MICROGram(s) Oral daily  midodrine 10 milliGRAM(s) Oral three times a day  montelukast 10 milliGRAM(s) Oral daily  phenytoin   Capsule 100 milliGRAM(s) Oral three times a day  polyethylene glycol 3350 17 Gram(s) Oral at bedtime  pyridoxine 100 milliGRAM(s) Oral daily  simvastatin 40 milliGRAM(s) Oral at bedtime  tamsulosin 0.4 milliGRAM(s) Oral at bedtime  tiotropium 18 MICROgram(s) Capsule 1 Capsule(s) Inhalation daily    MEDICATIONS  (PRN):  ALBUTerol    90 MICROgram(s) HFA Inhaler 1 Puff(s) Inhalation daily PRN Bronchospasm    Social History: ????????    ROS: ENT, GI, , CV, Pulm, Neuro, Psych, MS, Heme, Endo, Constitutional; all negative except as noted in HPI    Vital Signs Last 24 Hrs  T(C): 36.3 (31 May 2018 17:10), Max: 36.4 (31 May 2018 09:54)  T(F): 97.3 (31 May 2018 17:10), Max: 97.5 (31 May 2018 09:54)  HR: 75 (31 May 2018 17:10) (60 - 85)  BP: 125/63 (31 May 2018 17:10) (93/42 - 125/63)  BP(mean): --  RR: 20 (31 May 2018 17:10) (18 - 22)  SpO2: 100% (31 May 2018 17:10) (93% - 100%)                          10.4   6.5   )-----------( 186      ( 31 May 2018 10:33 )             30.9    05-31    141  |  104  |  16  ----------------------------<  86  4.3   |  28  |  0.65    Ca    9.1      31 May 2018 10:33  Phos  3.2     05-31  Mg     1.7     05-31    TPro  6.6  /  Alb  3.0<L>  /  TBili  0.3  /  DBili  x   /  AST  52<H>  /  ALT  37  /  AlkPhos  224<H>  05-31   PT/INR - ( 31 May 2018 10:33 )   PT: 11.8 sec;   INR: 1.09 ratio         PTT - ( 31 May 2018 10:33 )  PTT:30.6 sec    PHYSICAL EXAM:  Gen: NAD, well-developed  Head: Normocephalic, Atraumatic  Face: no edema/erythema/fluctuance, parotid glands soft without mass  Eyes: PERRL, EOMI, no scleral injection  Nose: Nares bilaterally patent, no discharge  Mouth: Mucosa moist, tongue/uvula midline, oropharynx clear  Neck: Flat, supple, no lymphadenopathy, trachea midline, no masses  Resp: no use of accessory muscles, no stridor  CV: no peripheral edema/cyanosis CC: hemoptysis    HPI: 81 y/o male HTN, CVA, seizures on dilantin, RA, CAD s/p 6 stents (most recent 4/2018), Esophageal CA s/p chemo/RT and esophagogastrectomy (8/14/2017), wheelchair bound p/w hemoptysis since this morning. Reports his hemoptysis is more mucus mixed with red blood/occasional clots.  Pt. on asa and plavix for stents. Denies dizziness, SOB. Patient has a chronic productive cough with white to yellow phlegm and is on Spiriva and Symbicort. He has a chronic post nasal drip and occasionally has nose bleeds but none in the past year.        PAST MEDICAL & SURGICAL HISTORY:  Coronary artery disease: s/p 3 stents on June 30, 2017 at [place] Dr. 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  History of tonsillectomy  H/O heart artery stent: June 30, 2017  Knee arthropathy: left  History of total hip replacement: left  History of hernia repair    Allergies    penicillin (Rash)    Intolerances      MEDICATIONS  (STANDING):  amitriptyline 10 milliGRAM(s) Oral two times a day  aspirin enteric coated 81 milliGRAM(s) Oral daily  clopidogrel Tablet 75 milliGRAM(s) Oral daily  cyanocobalamin 500 MICROGram(s) Oral daily  finasteride 5 milliGRAM(s) Oral daily  folic acid 1 milliGRAM(s) Oral daily  levothyroxine 25 MICROGram(s) Oral daily  midodrine 10 milliGRAM(s) Oral three times a day  montelukast 10 milliGRAM(s) Oral daily  phenytoin   Capsule 100 milliGRAM(s) Oral three times a day  polyethylene glycol 3350 17 Gram(s) Oral at bedtime  pyridoxine 100 milliGRAM(s) Oral daily  simvastatin 40 milliGRAM(s) Oral at bedtime  tamsulosin 0.4 milliGRAM(s) Oral at bedtime  tiotropium 18 MICROgram(s) Capsule 1 Capsule(s) Inhalation daily    MEDICATIONS  (PRN):  ALBUTerol    90 MICROgram(s) HFA Inhaler 1 Puff(s) Inhalation daily PRN Bronchospasm    Social History: ????????    ROS: ENT, GI, , CV, Pulm, Neuro, Psych, MS, Heme, Endo, Constitutional; all negative except as noted in HPI    Vital Signs Last 24 Hrs  T(C): 36.3 (31 May 2018 17:10), Max: 36.4 (31 May 2018 09:54)  T(F): 97.3 (31 May 2018 17:10), Max: 97.5 (31 May 2018 09:54)  HR: 75 (31 May 2018 17:10) (60 - 85)  BP: 125/63 (31 May 2018 17:10) (93/42 - 125/63)  BP(mean): --  RR: 20 (31 May 2018 17:10) (18 - 22)  SpO2: 100% (31 May 2018 17:10) (93% - 100%)                          10.4   6.5   )-----------( 186      ( 31 May 2018 10:33 )             30.9    05-31    141  |  104  |  16  ----------------------------<  86  4.3   |  28  |  0.65    Ca    9.1      31 May 2018 10:33  Phos  3.2     05-31  Mg     1.7     05-31    TPro  6.6  /  Alb  3.0<L>  /  TBili  0.3  /  DBili  x   /  AST  52<H>  /  ALT  37  /  AlkPhos  224<H>  05-31   PT/INR - ( 31 May 2018 10:33 )   PT: 11.8 sec;   INR: 1.09 ratio         PTT - ( 31 May 2018 10:33 )  PTT:30.6 sec    PHYSICAL EXAM:  Gen: NAD  Head: Normocephalic, Atraumatic  Face: no edema/erythema/fluctuance, parotid glands soft without mass  Eyes: PERRL, EOMI, no scleral injection  Nose: Nares bilaterally patent, no discharge  Mouth: Mucosa moist, tongue/uvula midline, oropharynx clear  Neck: Flat, supple, no lymphadenopathy, trachea midline, no masses  Resp: no use of accessory muscles, no stridor  CV: no peripheral edema/cyanosis CC: hemoptysis    HPI: 81 y/o male HTN, CVA, seizures on dilantin, RA, CAD s/p 6 stents (most recent 4/2018), Esophageal CA s/p chemo/RT and esophagogastrectomy (8/14/2017), wheelchair bound p/w hemoptysis since this morning. Reports his hemoptysis is more mucus mixed with red blood/occasional clots. He states hemoptysis occurs at any time during the day. Pt with history of recurrent epistaxis and known history of septal perforation. Pt on asa and plavix for stents. Denies dizziness, SOB. Patient has a chronic productive cough with white to yellow phlegm and is on Spiriva and Symbicort. He has a chronic post nasal drip and occasionally has nose bleeds but none in the past year. H/H 8.8/26.6 trending down.      PAST MEDICAL & SURGICAL HISTORY:  Coronary artery disease: s/p 3 stents on June 30, 2017 at [place] Dr. Lavelle Reid  OA (osteoarthritis) of knee: right  Former smoker, stopped smoking in distant past  Rheumatoid arthritis  Seizure disorder: 1 episode approximately 15 yrs ago  Asthma  Hyperlipidemia  BPH (benign prostatic hyperplasia)  HTN (hypertension)  Esophagus cancer  Chronic allergic rhinitis  BCC (basal cell carcinoma): skin  Cerebrovascular accident (CVA)  Anxiety  Anastomotic leak following esophagectomy: [place] [facility] esophagectomy  History of tonsillectomy  H/O heart artery stent: June 30, 2017  Knee arthropathy: left  History of total hip replacement: left  History of hernia repair    Allergies    penicillin (Rash)    Intolerances      MEDICATIONS  (STANDING):  amitriptyline 10 milliGRAM(s) Oral two times a day  aspirin enteric coated 81 milliGRAM(s) Oral daily  buDESOnide 160 MICROgram(s)/formoterol 4.5 MICROgram(s) Inhaler 2 Puff(s) Inhalation two times a day  clopidogrel Tablet 75 milliGRAM(s) Oral daily  cyanocobalamin 500 MICROGram(s) Oral daily  finasteride 5 milliGRAM(s) Oral daily  fluticasone propionate 50 MICROgram(s)/spray Nasal Spray 1 Spray(s) Both Nostrils two times a day  folic acid 1 milliGRAM(s) Oral daily  levothyroxine 25 MICROGram(s) Oral daily  midodrine 10 milliGRAM(s) Oral three times a day  montelukast 10 milliGRAM(s) Oral daily  phenytoin   Capsule 100 milliGRAM(s) Oral three times a day  polyethylene glycol 3350 17 Gram(s) Oral at bedtime  pyridoxine 100 milliGRAM(s) Oral daily  simvastatin 40 milliGRAM(s) Oral at bedtime  tamsulosin 0.4 milliGRAM(s) Oral at bedtime  tiotropium 18 MICROgram(s) Capsule 1 Capsule(s) Inhalation daily    MEDICATIONS  (PRN):  ALBUTerol    90 MICROgram(s) HFA Inhaler 1 Puff(s) Inhalation daily PRN Bronchospasm      Social History: former smoker    ROS: ENT, GI, , CV, Pulm, Neuro, Psych, MS, Heme, Endo, Constitutional; all negative except as noted in HPI    Vital Signs Last 24 Hrs  T(C): 36.7 (01 Jun 2018 04:25), Max: 36.7 (01 Jun 2018 04:25)  T(F): 98.1 (01 Jun 2018 04:25), Max: 98.1 (01 Jun 2018 04:25)  HR: 89 (01 Jun 2018 04:25) (60 - 89)  BP: 105/59 (01 Jun 2018 04:25) (93/42 - 125/63)  BP(mean): --  RR: 19 (01 Jun 2018 04:25) (18 - 22)  SpO2: 95% (01 Jun 2018 04:25) (93% - 100%)                          8.8    4.97  )-----------( 164      ( 31 May 2018 20:37 )             26.6    06-01    141  |  103  |  20  ----------------------------<  44<LL>  4.2   |  22  |  0.66    Ca    9.4      01 Jun 2018 06:45  Phos  3.2     05-31  Mg     1.7     05-31    TPro  6.4  /  Alb  2.8<L>  /  TBili  0.2  /  DBili  x   /  AST  32  /  ALT  31  /  AlkPhos  214<H>  06-01   PT/INR - ( 31 May 2018 10:33 )   PT: 11.8 sec;   INR: 1.09 ratio         PTT - ( 31 May 2018 10:33 )  PTT:30.6 sec      IMAGING/ADDITIONAL STUDIES:   PHYSICAL EXAM:  Gen: NAD  Head: Normocephalic, Atraumatic  Face: no edema/erythema/fluctuance, parotid glands soft without mass  Eyes: PERRL, EOMI, no scleral injection  Nose: Nares bilaterally patent, no discharge, + crusting noted in b/l nares. Upon cleaning a clot from right nare pt began to bleed, controlled with merocel in right nare  Mouth: Mucosa moist, tongue/uvula midline, oropharynx with dry blood, no active bleeding noted  Neck: Flat, supple, no lymphadenopathy, trachea midline, no masses  Resp: no use of accessory muscles, no stridor  CV: no peripheral edema/cyanosis    Indirect Laryngoscopy (scope #2):  + anterior nasal crusting, + anterior septal perforation, Nasopharynx and oropharynx clear, no bleeding. Airway patent, no foreign body visualized. + small amount of blood noted in post cricoid region around esophageal inlet, No erythema, edema, pooling of secretions, masses or lesions. No glottic/supraglottic edema. Vocal cords mobile with good contact b/l.

## 2018-05-31 NOTE — ED ADULT NURSE NOTE - PMH
Anxiety    Asthma    BCC (basal cell carcinoma)  skin  BPH (benign prostatic hyperplasia)    Cerebrovascular accident (CVA)    Chronic allergic rhinitis    Coronary artery disease  s/p 3 stents on June 30, 2017 at [place] Dr. Lavelle Reid  Esophagus cancer    Former smoker, stopped smoking in distant past    HTN (hypertension)    Hyperlipidemia    OA (osteoarthritis) of knee  right  Rheumatoid arthritis    Seizure disorder  1 episode approximately 15 yrs ago

## 2018-05-31 NOTE — ED PROVIDER NOTE - CARE PLAN
Principal Discharge DX:	AV block, Mobitz 1  Secondary Diagnosis:	Hemoptysis  Secondary Diagnosis:	Hematemesis

## 2018-05-31 NOTE — ED PROVIDER NOTE - OBJECTIVE STATEMENT
83yo male HTN, CVA, seizures on dilantin, RA, CAD s/p 6 stents, Esophageal CA s/p chemo/RT and esophagogastrectomy, wheelchair bound p/w hemoptysis since this morning. Pt. and wife reports occasional clots, and blood streaked sputum. Deny fevers, n/v, abdominal pain, anticoagulant use, melena/hematochezia. Pt. on asa and plavix for stents. Pt. discharged from hospital 2 days ago after stay for supratherapeutic dilantin levels. ZEFERINO: Leif Santiago; Pulm: Larry; GI: Catrachita

## 2018-05-31 NOTE — CONSULT NOTE ADULT - ATTENDING COMMENTS
Pt seen and examined on 5/31. Pt with numerous medical problems including CAD s/p recent PCI in April now with small amounts of ? hemoptysis since the morning. Currently hemodynamically stable, oxygenating well on RA. CT chest with linear atelectasis at R base and b/l small pleural effusions. Would suggest close monitoring of resp status and CBC. Suggest hycodan Q8H for cough and an ENT evalv (? epistaxis as pt has had numerous episodes of epistaxis in the past). AC can not be held due to recent PCI. No plan for bronch at present. Dr. Juarez will follow pt during hospitalization.

## 2018-05-31 NOTE — ED PROVIDER NOTE - PMH
Anxiety    Asthma    BCC (basal cell carcinoma)  skin  BPH (benign prostatic hyperplasia)    Cerebrovascular accident (CVA)    Chronic allergic rhinitis    Coronary artery disease  s/p 3 stents on June 30, 2017 at [place] Dr. Lavelle Reid  Esophagus cancer    Former smoker, stopped smoking in distant past    HTN (hypertension)    Hyperlipidemia    OA (osteoarthritis) of knee  right  Rheumatoid arthritis    Seizure disorder  1 episode approximately 15 yrs ago

## 2018-05-31 NOTE — CONSULT NOTE ADULT - ASSESSMENT
81 y/o male with h/o CAD s/p 6 stents (most recent 4/2018), Esophageal CA s/p chemo/RT and esophagogastrectomy (8/14/2017) c/o  hemoptysis since this morning. Reports mucus mixed with blood/clots. H/O chronic productive cough.  Pt. noted on ASA and plavix for stents. 83 y/o male with h/o CAD s/p 6 stents (most recent 4/2018), Esophageal CA s/p chemo/RT and esophagogastrectomy (8/14/2017) c/o  hemoptysis since this morning. Reports mucus mixed with blood/clots. Pt noted on ASA and plavix for stents. Laryngoscopy revealed small amount of blood in post cricoid region at esophageal inlet. Right epistaxis began during exam, controlled with merocel nasal packing. Pt with history of recurrent epistaxis. Difficult to determine definitively whether source of hemoptysis is from esophagus or epistaxis, possibly both.

## 2018-05-31 NOTE — H&P ADULT - ASSESSMENT
pt with mult medical hx with ashd s/p recent stent LADand D1 ,orthostatic hypotension who presented with hemoptyses blood tinged cough, adn in er with possible mobitz 1.  pt asymptomatic with no hx of dizziness or syncope.  tsh  dilantin level  doubt hemoptysis possible throat irritation from cough  continue asa and Plavix  observe  continue midodrine pt with mult medical hx with ashd s/p recent stent LADand D1 ,orthostatic hypotension who presented with hemoptyses blood tinged cough, adn in er with possible mobitz 1.  pt asymptomatic with no hx of dizziness or syncope.  tsh  dilantin level  doubt hemoptysis possible throat irritation from cough  continue asa and Plavix  observe  continue midodrine  addendum  pt had a small amount of hemoptysis  ent/pulmonary eval  npo till clear

## 2018-05-31 NOTE — CONSULT NOTE ADULT - SUBJECTIVE AND OBJECTIVE BOX
CHIEF COMPLAINT:    HPI: 82M with h/o HTN, Seizure d/o, Rheumatoid arthritis, Asthma, CAD s/p PCI x 5 in the past with last two stents in 4/2018 on ASA/Plavix, Esophageal CA s/p esophagogastroduodenectomy in 8/14/2017 presents with multiple episodes of hemoptysis. Patient has a chronic productive cough with white to yellow phlegm and is on Spiriva and Symbicort. He has a chronic post nasal drip and occasionally has nose bleeds but none in the past year.    PAST MEDICAL & SURGICAL HISTORY:  Coronary artery disease: s/p 3 stents on June 30, 2017 at [place] Dr. 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  History of tonsillectomy  H/O heart artery stent: June 30, 2017  Knee arthropathy: left  History of total hip replacement: left  History of hernia repair      FAMILY HISTORY:  Family history of heart attack  Family history of pulmonary embolism: father @ 49 yr old  FH: CAD (coronary artery disease) (Sibling)      SOCIAL HISTORY:  Smoking: [ ] Never Smoked [ ] Former Smoker (__ packs x ___ years) [ ] Current Smoker  (__ packs x ___ years)  Substance Use: [ ] Never Used [ ] Used ____  EtOH Use:  Marital Status: [ ] Single [ ]  [ ]  [ ]   Sexual History:   Occupation:  Recent Travel:  Country of Birth:  Advance Directives:    Allergies    penicillin (Rash)    Intolerances        HOME MEDICATIONS:    REVIEW OF SYSTEMS:  Constitutional: [ ] negative [ ] fevers [ ] chills [ ] weight loss [ ] weight gain  HEENT: [ ] negative [ ] dry eyes [ ] eye irritation [ ] postnasal drip [ ] nasal congestion  CV: [ ] negative  [ ] chest pain [ ] orthopnea [ ] palpitations [ ] murmur  Resp: [ ] negative [ ] cough [ ] shortness of breath [ ] dyspnea [ ] wheezing [ ] sputum [ ] hemoptysis  GI: [ ] negative [ ] nausea [ ] vomiting [ ] diarrhea [ ] constipation [ ] abd pain [ ] dysphagia   : [ ] negative [ ] dysuria [ ] nocturia [ ] hematuria [ ] increased urinary frequency  Musculoskeletal: [ ] negative [ ] back pain [ ] myalgias [ ] arthralgias [ ] fracture  Skin: [ ] negative [ ] rash [ ] itch  Neurological: [ ] negative [ ] headache [ ] dizziness [ ] syncope [ ] weakness [ ] numbness  Psychiatric: [ ] negative [ ] anxiety [ ] depression  Endocrine: [ ] negative [ ] diabetes [ ] thyroid problem  Hematologic/Lymphatic: [ ] negative [ ] anemia [ ] bleeding problem  Allergic/Immunologic: [ ] negative [ ] itchy eyes [ ] nasal discharge [ ] hives [ ] angioedema  [ ] All other systems negative  [ ] Unable to assess ROS because ________    OBJECTIVE:  ICU Vital Signs Last 24 Hrs  T(C): 36.3 (31 May 2018 17:10), Max: 36.4 (31 May 2018 09:54)  T(F): 97.3 (31 May 2018 17:10), Max: 97.5 (31 May 2018 09:54)  HR: 75 (31 May 2018 17:10) (60 - 85)  BP: 125/63 (31 May 2018 17:10) (93/42 - 125/63)  BP(mean): --  ABP: --  ABP(mean): --  RR: 20 (31 May 2018 17:10) (18 - 22)  SpO2: 100% (31 May 2018 17:10) (93% - 100%)        05-31 @ 07:01  -  05-31 @ 17:14  --------------------------------------------------------  IN: 0 mL / OUT: 100 mL / NET: -100 mL      CAPILLARY BLOOD GLUCOSE      POCT Blood Glucose.: 78 mg/dL (31 May 2018 10:06)      PHYSICAL EXAM:  General:   HEENT:   Lymph Nodes:  Neck:   Respiratory:   Cardiovascular:   Abdomen:   Extremities:   Skin:   Neurological:  Psychiatry:    HOSPITAL MEDICATIONS:  aspirin enteric coated 81 milliGRAM(s) Oral daily  clopidogrel Tablet 75 milliGRAM(s) Oral daily      midodrine 10 milliGRAM(s) Oral three times a day  tamsulosin 0.4 milliGRAM(s) Oral at bedtime    finasteride 5 milliGRAM(s) Oral daily  levothyroxine 25 MICROGram(s) Oral daily  simvastatin 40 milliGRAM(s) Oral at bedtime    ALBUTerol    90 MICROgram(s) HFA Inhaler 1 Puff(s) Inhalation daily PRN  montelukast 10 milliGRAM(s) Oral daily  tiotropium 18 MICROgram(s) Capsule 1 Capsule(s) Inhalation daily    amitriptyline 10 milliGRAM(s) Oral two times a day  phenytoin   Capsule 100 milliGRAM(s) Oral three times a day    polyethylene glycol 3350 17 Gram(s) Oral at bedtime        cyanocobalamin 500 MICROGram(s) Oral daily  folic acid 1 milliGRAM(s) Oral daily  pyridoxine 100 milliGRAM(s) Oral daily            LABS:                        10.4   6.5   )-----------( 186      ( 31 May 2018 10:33 )             30.9     Hgb Trend: 10.4<--, 10.5<--, 11.0<--  05-31    141  |  104  |  16  ----------------------------<  86  4.3   |  28  |  0.65    Ca    9.1      31 May 2018 10:33  Phos  3.2     05-31  Mg     1.7     05-31    TPro  6.6  /  Alb  3.0<L>  /  TBili  0.3  /  DBili  x   /  AST  52<H>  /  ALT  37  /  AlkPhos  224<H>  05-31    Creatinine Trend: 0.65<--, 0.64<--  PT/INR - ( 31 May 2018 10:33 )   PT: 11.8 sec;   INR: 1.09 ratio         PTT - ( 31 May 2018 10:33 )  PTT:30.6 sec      Venous Blood Gas:  05-31 @ 10:33  7.36/54/21/30/27  VBG Lactate: 1.1      MICROBIOLOGY:     RADIOLOGY:  [ ] Reviewed and interpreted by me    PULMONARY FUNCTION TESTS:    EKG: CHIEF COMPLAINT:    HPI: 82M with h/o HTN, Seizure d/o, Rheumatoid arthritis, Asthma, CAD s/p PCI x 5 in the past with last two stents in 4/2018 on ASA/Plavix, Esophageal CA s/p esophagectomy in 8/14/2017 presents with multiple episodes of hemoptysis. Patient has a chronic productive cough with white to yellow phlegm and is on Spiriva and Symbicort. He has a chronic post nasal drip and occasionally has nose bleeds but none in the past year.    PAST MEDICAL & SURGICAL HISTORY:  Coronary artery disease: s/p 3 stents on June 30, 2017 at [place] Dr. 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  History of tonsillectomy  H/O heart artery stent: June 30, 2017  Knee arthropathy: left  History of total hip replacement: left  History of hernia repair      FAMILY HISTORY:  Family history of heart attack  Family history of pulmonary embolism: father @ 49 yr old  FH: CAD (coronary artery disease) (Sibling)      SOCIAL HISTORY:  Smoking: [ ] Never Smoked [ ] Former Smoker (__ packs x ___ years) [ ] Current Smoker  (__ packs x ___ years)  Substance Use: [ ] Never Used [ ] Used ____  EtOH Use:  Marital Status: [ ] Single [ ]  [ ]  [ ]   Sexual History:   Occupation:  Recent Travel:  Country of Birth:  Advance Directives:    Allergies    penicillin (Rash)    Intolerances        HOME MEDICATIONS:    REVIEW OF SYSTEMS:  Constitutional: [x ] negative [ ] fevers [ ] chills [ ] weight loss [ ] weight gain  HEENT: [ ] negative [ ] dry eyes [ ] eye irritation [ x] postnasal drip [ ] nasal congestion  CV: [x ] negative  [ ] chest pain [ ] orthopnea [ ] palpitations [ ] murmur  Resp: [ ] negative [x ] cough [ ] shortness of breath [ ] dyspnea [ ] wheezing [x ] sputum [x ] hemoptysis  GI: [x ] negative [ ] nausea [ ] vomiting [ ] diarrhea [ ] constipation [ ] abd pain [ ] dysphagia   : [ x] negative [ ] dysuria [ ] nocturia [ ] hematuria [ ] increased urinary frequency  Musculoskeletal: [x ] negative [ ] back pain [ ] myalgias [ ] arthralgias [ ] fracture  Skin: [x ] negative [ ] rash [ ] itch  Neurological: [x ] negative [ ] headache [ ] dizziness [ ] syncope [ ] weakness [ ] numbness  Psychiatric: [x ] negative [ ] anxiety [ ] depression  Endocrine: [ ] negative [ ] diabetes [ ] thyroid problem  Hematologic/Lymphatic: [ ] negative [ ] anemia [ ] bleeding problem  Allergic/Immunologic: [ ] negative [ ] itchy eyes [ ] nasal discharge [ ] hives [ ] angioedema  [x ] All other systems negative  [ ] Unable to assess ROS because ________    OBJECTIVE:  T(C): 36.3 (31 May 2018 17:10), Max: 36.4 (31 May 2018 09:54)  T(F): 97.3 (31 May 2018 17:10), Max: 97.5 (31 May 2018 09:54)  HR: 75 (31 May 2018 17:10) (60 - 85)  BP: 125/63 (31 May 2018 17:10) (93/42 - 125/63)  RR: 20 (31 May 2018 17:10) (18 - 22)  SpO2: 100% (31 May 2018 17:10) (93% - 100%)        05-31 @ 07:01  -  05-31 @ 17:14  --------------------------------------------------------  IN: 0 mL / OUT: 100 mL / NET: -100 mL      CAPILLARY BLOOD GLUCOSE      POCT Blood Glucose.: 78 mg/dL (31 May 2018 10:06)      PHYSICAL EXAM:  General: NAD  HEENT: CARLOS  Lymph Nodes:  Neck: No JVD  Respiratory: CTA b/l  Cardiovascular: RRR  Abdomen: S/NT/ND  Extremities: -c/c/e  Skin: no rash  Neurological: non-focal  Psychiatry:    HOSPITAL MEDICATIONS:  aspirin enteric coated 81 milliGRAM(s) Oral daily  clopidogrel Tablet 75 milliGRAM(s) Oral daily      midodrine 10 milliGRAM(s) Oral three times a day  tamsulosin 0.4 milliGRAM(s) Oral at bedtime    finasteride 5 milliGRAM(s) Oral daily  levothyroxine 25 MICROGram(s) Oral daily  simvastatin 40 milliGRAM(s) Oral at bedtime    ALBUTerol    90 MICROgram(s) HFA Inhaler 1 Puff(s) Inhalation daily PRN  montelukast 10 milliGRAM(s) Oral daily  tiotropium 18 MICROgram(s) Capsule 1 Capsule(s) Inhalation daily    amitriptyline 10 milliGRAM(s) Oral two times a day  phenytoin   Capsule 100 milliGRAM(s) Oral three times a day    polyethylene glycol 3350 17 Gram(s) Oral at bedtime        cyanocobalamin 500 MICROGram(s) Oral daily  folic acid 1 milliGRAM(s) Oral daily  pyridoxine 100 milliGRAM(s) Oral daily            LABS:                        10.4   6.5   )-----------( 186      ( 31 May 2018 10:33 )             30.9     Hgb Trend: 10.4<--, 10.5<--, 11.0<--  05-31    141  |  104  |  16  ----------------------------<  86  4.3   |  28  |  0.65    Ca    9.1      31 May 2018 10:33  Phos  3.2     05-31  Mg     1.7     05-31    TPro  6.6  /  Alb  3.0<L>  /  TBili  0.3  /  DBili  x   /  AST  52<H>  /  ALT  37  /  AlkPhos  224<H>  05-31    Creatinine Trend: 0.65<--, 0.64<--  PT/INR - ( 31 May 2018 10:33 )   PT: 11.8 sec;   INR: 1.09 ratio         PTT - ( 31 May 2018 10:33 )  PTT:30.6 sec      Venous Blood Gas:  05-31 @ 10:33  7.36/54/21/30/27  VBG Lactate: 1.1      MICROBIOLOGY:     RADIOLOGY:  [x ] Reviewed and interpreted by me  CTA : No hilar and/or mediastinal adenopathy is noted.     Heart is enlarged in size. Calcification/stent noted within the coronary   artery. Aortic valvular calcification is also noted. No pericardial   effusion is noted. Pulmonary arteries are normal in caliber. No filling   defects are noted.    No endobronchial lesions are noted. Mucus is present within the right   mainstem bronchus as well as within the bronchus intermedius. Linear   opacities representing atelectasis are noted in the right lower lobe.   Small bilateral pleural effusions are noted.    PULMONARY FUNCTION TESTS:    EKG: CHIEF COMPLAINT:    HPI: 82M with h/o HTN, Seizure d/o, Rheumatoid arthritis, Asthma, CAD s/p PCI x 5 in the past with last two stents in 4/2018 on ASA/Plavix, Esophageal CA s/p esophagectomy in 8/14/2017 presents with multiple episodes of hemoptysis. Patient has a chronic productive cough with white to yellow phlegm and is on Spiriva and Symbicort. He has a chronic post nasal drip and occasionally has nose bleeds but none in the past year.    PAST MEDICAL & SURGICAL HISTORY:  Coronary artery disease: s/p 3 stents on June 30, 2017 at [place] Dr. 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  History of tonsillectomy  H/O heart artery stent: June 30, 2017  Knee arthropathy: left  History of total hip replacement: left  History of hernia repair      FAMILY HISTORY:  Family history of heart attack  Family history of pulmonary embolism: father @ 49 yr old  FH: CAD (coronary artery disease) (Sibling)      SOCIAL HISTORY:  Smoking: [x ] Never Smoked [ ] Former Smoker (__ packs x ___ years) [ ] Current Smoker  (__ packs x ___ years)  Substance Use: [x ] Never Used [ ] Used ____  EtOH Use: Social  Marital Status: [ ] Single [ x]  [ ]  [ ]   Sexual History: NC  Occupation: Retired   Recent Travel: None  Advance Directives: Full code     Allergies    penicillin (Rash)    Intolerances        HOME MEDICATIONS:  Reviewed   REVIEW OF SYSTEMS:  Constitutional: [x ] negative [ ] fevers [ ] chills [ ] weight loss [ ] weight gain  HEENT: [ ] negative [ ] dry eyes [ ] eye irritation [ x] postnasal drip [ ] nasal congestion  CV: [x ] negative  [ ] chest pain [ ] orthopnea [ ] palpitations [ ] murmur  Resp: [ ] negative [x ] cough [ ] shortness of breath [ ] dyspnea [ ] wheezing [x ] sputum [x ] hemoptysis  GI: [x ] negative [ ] nausea [ ] vomiting [ ] diarrhea [ ] constipation [ ] abd pain [ ] dysphagia   : [ x] negative [ ] dysuria [ ] nocturia [ ] hematuria [ ] increased urinary frequency  Musculoskeletal: [x ] negative [ ] back pain [ ] myalgias [ ] arthralgias [ ] fracture  Skin: [x ] negative [ ] rash [ ] itch  Neurological: [x ] negative [ ] headache [ ] dizziness [ ] syncope [ ] weakness [ ] numbness  Psychiatric: [x ] negative [ ] anxiety [ ] depression  Endocrine: [ ] negative [ ] diabetes [ ] thyroid problem  Hematologic/Lymphatic: [ ] negative [ ] anemia [ ] bleeding problem  Allergic/Immunologic: [ ] negative [ ] itchy eyes [ ] nasal discharge [ ] hives [ ] angioedema  [x ] All other systems negative  [ ] Unable to assess ROS because ________    OBJECTIVE:  T(C): 36.3 (31 May 2018 17:10), Max: 36.4 (31 May 2018 09:54)  T(F): 97.3 (31 May 2018 17:10), Max: 97.5 (31 May 2018 09:54)  HR: 75 (31 May 2018 17:10) (60 - 85)  BP: 125/63 (31 May 2018 17:10) (93/42 - 125/63)  RR: 20 (31 May 2018 17:10) (18 - 22)  SpO2: 100% (31 May 2018 17:10) (93% - 100%)        05-31 @ 07:01  -  05-31 @ 17:14  --------------------------------------------------------  IN: 0 mL / OUT: 100 mL / NET: -100 mL      CAPILLARY BLOOD GLUCOSE      POCT Blood Glucose.: 78 mg/dL (31 May 2018 10:06)      PHYSICAL EXAM:  General: NAD  HEENT: PERRLA  Lymph Nodes:  Neck: No JVD  Respiratory: CTA b/l  Cardiovascular: RRR  Abdomen: S/NT/ND  Extremities: -c/c/e  Skin: no rash  Neurological: non-focal  Psychiatry: Normal mood     HOSPITAL MEDICATIONS:  aspirin enteric coated 81 milliGRAM(s) Oral daily  clopidogrel Tablet 75 milliGRAM(s) Oral daily      midodrine 10 milliGRAM(s) Oral three times a day  tamsulosin 0.4 milliGRAM(s) Oral at bedtime    finasteride 5 milliGRAM(s) Oral daily  levothyroxine 25 MICROGram(s) Oral daily  simvastatin 40 milliGRAM(s) Oral at bedtime    ALBUTerol    90 MICROgram(s) HFA Inhaler 1 Puff(s) Inhalation daily PRN  montelukast 10 milliGRAM(s) Oral daily  tiotropium 18 MICROgram(s) Capsule 1 Capsule(s) Inhalation daily    amitriptyline 10 milliGRAM(s) Oral two times a day  phenytoin   Capsule 100 milliGRAM(s) Oral three times a day    polyethylene glycol 3350 17 Gram(s) Oral at bedtime        cyanocobalamin 500 MICROGram(s) Oral daily  folic acid 1 milliGRAM(s) Oral daily  pyridoxine 100 milliGRAM(s) Oral daily            LABS:                        10.4   6.5   )-----------( 186      ( 31 May 2018 10:33 )             30.9     Hgb Trend: 10.4<--, 10.5<--, 11.0<--  05-31    141  |  104  |  16  ----------------------------<  86  4.3   |  28  |  0.65    Ca    9.1      31 May 2018 10:33  Phos  3.2     05-31  Mg     1.7     05-31    TPro  6.6  /  Alb  3.0<L>  /  TBili  0.3  /  DBili  x   /  AST  52<H>  /  ALT  37  /  AlkPhos  224<H>  05-31    Creatinine Trend: 0.65<--, 0.64<--  PT/INR - ( 31 May 2018 10:33 )   PT: 11.8 sec;   INR: 1.09 ratio         PTT - ( 31 May 2018 10:33 )  PTT:30.6 sec      Venous Blood Gas:  05-31 @ 10:33  7.36/54/21/30/27  VBG Lactate: 1.1      MICROBIOLOGY:     RADIOLOGY:  [x ] Reviewed and interpreted by me  CTA : No hilar and/or mediastinal adenopathy is noted.     Heart is enlarged in size. Calcification/stent noted within the coronary   artery. Aortic valvular calcification is also noted. No pericardial   effusion is noted. Pulmonary arteries are normal in caliber. No filling   defects are noted.    No endobronchial lesions are noted. Mucus is present within the right   mainstem bronchus as well as within the bronchus intermedius. Linear   opacities representing atelectasis are noted in the right lower lobe.   Small bilateral pleural effusions are noted.    PULMONARY FUNCTION TESTS:    EKG:

## 2018-05-31 NOTE — ED PROVIDER NOTE - ATTENDING CONTRIBUTION TO CARE
82M hx CAD s/p multiple stents on ASA/plavix, RA on prednisone, COPD on albuterol/spiriva, hypothyroidism on synthroid, esophageal ca s/p esophagectomy presents with episodes of hemoptysis versus hematemesis this morning.  Reports more mucus mixed with bright red blood and clot.  Denies chest pain, shortness of breath, nausea, abdominal pain, melena/hematochezia.  Antiplatelet use but no other anticoagulants.     Report of tachypnea, hypoxia and hypotension in field, however, 82M hx CAD s/p multiple stents on ASA/plavix, RA on prednisone, COPD on albuterol/spiriva, hypothyroidism on synthroid, esophageal ca s/p esophagectomy presents with episodes of hemoptysis versus hematemesis this morning.  Reports more mucus mixed with bright red blood and clot.  Denies chest pain, shortness of breath, nausea, abdominal pain, melena/hematochezia.  Antiplatelet use but no other anticoagulants.     Report of tachypnea, hypoxia and hypotension in field, however, pt normotensive with normal SO2 and no tachypnea at presentation to ED exam room  (Attending - Rosamaria) Resting comfortably in NAD, AAOx3, NCAT, MMM, Trachea midline, PERRL, bibasilar crackles, Non-tachy, Normal perfusion, soft, NTND, No CVAT b/l, b/l LE swelling R>L with mild R gastrocnemius ttp, No deformity of extremities, Appropriate, Cooperative, No rashes, CN grossly intact, No focal motor deficits.   Concern for hemoptysis versus hematemesis.  history more suggestive of hemoptysis, however, pt with risks for hematemesis given esophagectomy.  Recent hospitalization and wheelchair bound making PE a possible cause of hemoptysis.  Less acute bronchitis, gastritis also on differential.  will obtain cbc, cmp, pt/ptt, ekg, cxr, cta chest/abd/pelvis.

## 2018-05-31 NOTE — ED ADULT NURSE NOTE - CHPI ED SYMPTOMS NEG
no chills/no headache/no body aches/no edema/no chest pain/no wheezing/no fever/no diaphoresis/no shortness of breath

## 2018-05-31 NOTE — H&P ADULT - PMH
Anxiety    Asthma    BCC (basal cell carcinoma)  skin  BPH (benign prostatic hyperplasia)    Cerebrovascular accident (CVA)    Chronic allergic rhinitis    Coronary artery disease  s/p 3 stents on June 30, 2017 at [place] Dr. Lavelle Reid  Esophagus cancer    Former smoker, stopped smoking in distant past    HTN (hypertension)    Hyperlipidemia    OA (osteoarthritis) of knee  right  Rheumatoid arthritis    Seizure disorder  1 episode approximately 15 yrs ago

## 2018-05-31 NOTE — CONSULT NOTE ADULT - PROBLEM SELECTOR RECOMMENDATION 2
- gram-positive abx coverage for duration of packing placement  - Strict blood pressure control  - Nasal saline, 2 sprays to both nares 4 times a day  - Avoid nasal trauma; no nose rubbing, blowing or manipulating nasal packing.  Sneeze with mouth open and pinching nares.  - Avoid bending with head blow the waist, no heavy lifting

## 2018-05-31 NOTE — ED PROVIDER NOTE - MEDICAL DECISION MAKING DETAILS
83yo male p/w hemoptysis. No respiratory distress. VSS, blood streaked sputum on exam, otherwise well appearing, guiac negative. Given history of esophageal cancer s/p resection, malignancy is possible, but negative guiac makes GI source less likely. PE also possible given prolonged stasis. Will obtain labs, ekg, chest xray, cta chest.

## 2018-06-01 ENCOUNTER — APPOINTMENT (OUTPATIENT)
Dept: WOUND CARE | Facility: CLINIC | Age: 83
End: 2018-06-01

## 2018-06-01 DIAGNOSIS — J42 UNSPECIFIED CHRONIC BRONCHITIS: ICD-10-CM

## 2018-06-01 DIAGNOSIS — C15.9 MALIGNANT NEOPLASM OF ESOPHAGUS, UNSPECIFIED: ICD-10-CM

## 2018-06-01 DIAGNOSIS — R04.0 EPISTAXIS: ICD-10-CM

## 2018-06-01 DIAGNOSIS — I25.10 ATHEROSCLEROTIC HEART DISEASE OF NATIVE CORONARY ARTERY WITHOUT ANGINA PECTORIS: ICD-10-CM

## 2018-06-01 DIAGNOSIS — Z29.9 ENCOUNTER FOR PROPHYLACTIC MEASURES, UNSPECIFIED: ICD-10-CM

## 2018-06-01 DIAGNOSIS — R06.02 SHORTNESS OF BREATH: ICD-10-CM

## 2018-06-01 DIAGNOSIS — J30.9 ALLERGIC RHINITIS, UNSPECIFIED: ICD-10-CM

## 2018-06-01 LAB
ALBUMIN SERPL ELPH-MCNC: 2.8 G/DL — LOW (ref 3.3–5)
ALP SERPL-CCNC: 214 U/L — HIGH (ref 40–120)
ALT FLD-CCNC: 31 U/L — SIGNIFICANT CHANGE UP (ref 10–45)
ANION GAP SERPL CALC-SCNC: 16 MMOL/L — SIGNIFICANT CHANGE UP (ref 5–17)
AST SERPL-CCNC: 32 U/L — SIGNIFICANT CHANGE UP (ref 10–40)
BILIRUB SERPL-MCNC: 0.2 MG/DL — SIGNIFICANT CHANGE UP (ref 0.2–1.2)
BUN SERPL-MCNC: 20 MG/DL — SIGNIFICANT CHANGE UP (ref 7–23)
CALCIUM SERPL-MCNC: 9.4 MG/DL — SIGNIFICANT CHANGE UP (ref 8.4–10.5)
CHLORIDE SERPL-SCNC: 103 MMOL/L — SIGNIFICANT CHANGE UP (ref 96–108)
CO2 SERPL-SCNC: 22 MMOL/L — SIGNIFICANT CHANGE UP (ref 22–31)
CREAT SERPL-MCNC: 0.66 MG/DL — SIGNIFICANT CHANGE UP (ref 0.5–1.3)
GLUCOSE BLDC GLUCOMTR-MCNC: 132 MG/DL — HIGH (ref 70–99)
GLUCOSE BLDC GLUCOMTR-MCNC: 45 MG/DL — CRITICAL LOW (ref 70–99)
GLUCOSE BLDC GLUCOMTR-MCNC: 46 MG/DL — LOW (ref 70–99)
GLUCOSE SERPL-MCNC: 44 MG/DL — CRITICAL LOW (ref 70–99)
HCT VFR BLD CALC: 26.7 % — LOW (ref 39–50)
HGB BLD-MCNC: 9 G/DL — LOW (ref 13–17)
MCHC RBC-ENTMCNC: 33.1 PG — SIGNIFICANT CHANGE UP (ref 27–34)
MCHC RBC-ENTMCNC: 33.7 GM/DL — SIGNIFICANT CHANGE UP (ref 32–36)
MCV RBC AUTO: 98.3 FL — SIGNIFICANT CHANGE UP (ref 80–100)
PLATELET # BLD AUTO: 193 K/UL — SIGNIFICANT CHANGE UP (ref 150–400)
POTASSIUM SERPL-MCNC: 4.2 MMOL/L — SIGNIFICANT CHANGE UP (ref 3.5–5.3)
POTASSIUM SERPL-SCNC: 4.2 MMOL/L — SIGNIFICANT CHANGE UP (ref 3.5–5.3)
PROT SERPL-MCNC: 6.4 G/DL — SIGNIFICANT CHANGE UP (ref 6–8.3)
RBC # BLD: 2.72 M/UL — LOW (ref 4.2–5.8)
RBC # FLD: 15.5 % — HIGH (ref 10.3–14.5)
SODIUM SERPL-SCNC: 141 MMOL/L — SIGNIFICANT CHANGE UP (ref 135–145)
TSH SERPL-MCNC: 3.62 UIU/ML — SIGNIFICANT CHANGE UP (ref 0.27–4.2)
WBC # BLD: 5.5 K/UL — SIGNIFICANT CHANGE UP (ref 3.8–10.5)
WBC # FLD AUTO: 5.5 K/UL — SIGNIFICANT CHANGE UP (ref 3.8–10.5)

## 2018-06-01 PROCEDURE — 93010 ELECTROCARDIOGRAM REPORT: CPT

## 2018-06-01 PROCEDURE — 99232 SBSQ HOSP IP/OBS MODERATE 35: CPT

## 2018-06-01 RX ORDER — BUDESONIDE AND FORMOTEROL FUMARATE DIHYDRATE 160; 4.5 UG/1; UG/1
2 AEROSOL RESPIRATORY (INHALATION)
Qty: 0 | Refills: 0 | Status: DISCONTINUED | OUTPATIENT
Start: 2018-06-01 | End: 2018-06-03

## 2018-06-01 RX ORDER — DEXTROSE 50 % IN WATER 50 %
50 SYRINGE (ML) INTRAVENOUS ONCE
Qty: 0 | Refills: 0 | Status: COMPLETED | OUTPATIENT
Start: 2018-06-01 | End: 2018-06-01

## 2018-06-01 RX ORDER — TIOTROPIUM BROMIDE 18 UG/1
1 CAPSULE ORAL; RESPIRATORY (INHALATION) DAILY
Qty: 0 | Refills: 0 | Status: DISCONTINUED | OUTPATIENT
Start: 2018-06-01 | End: 2018-06-03

## 2018-06-01 RX ORDER — FLUTICASONE PROPIONATE 50 MCG
1 SPRAY, SUSPENSION NASAL
Qty: 0 | Refills: 0 | Status: DISCONTINUED | OUTPATIENT
Start: 2018-06-01 | End: 2018-06-03

## 2018-06-01 RX ORDER — LEVOTHYROXINE SODIUM 125 MCG
25 TABLET ORAL
Qty: 0 | Refills: 0 | Status: DISCONTINUED | OUTPATIENT
Start: 2018-06-01 | End: 2018-06-03

## 2018-06-01 RX ORDER — AZITHROMYCIN 500 MG/1
250 TABLET, FILM COATED ORAL DAILY
Qty: 0 | Refills: 0 | Status: DISCONTINUED | OUTPATIENT
Start: 2018-06-01 | End: 2018-06-03

## 2018-06-01 RX ORDER — BUDESONIDE AND FORMOTEROL FUMARATE DIHYDRATE 160; 4.5 UG/1; UG/1
2 AEROSOL RESPIRATORY (INHALATION)
Qty: 0 | Refills: 0 | Status: DISCONTINUED | OUTPATIENT
Start: 2018-06-01 | End: 2018-06-01

## 2018-06-01 RX ORDER — LEVOTHYROXINE SODIUM 125 MCG
50 TABLET ORAL
Qty: 0 | Refills: 0 | Status: DISCONTINUED | OUTPATIENT
Start: 2018-06-01 | End: 2018-06-03

## 2018-06-01 RX ADMIN — Medication 50 MILLILITER(S): at 08:20

## 2018-06-01 RX ADMIN — Medication 100 MILLIGRAM(S): at 15:58

## 2018-06-01 RX ADMIN — Medication 100 MILLIGRAM(S): at 06:31

## 2018-06-01 RX ADMIN — TAMSULOSIN HYDROCHLORIDE 0.4 MILLIGRAM(S): 0.4 CAPSULE ORAL at 22:03

## 2018-06-01 RX ADMIN — AZITHROMYCIN 250 MILLIGRAM(S): 500 TABLET, FILM COATED ORAL at 22:03

## 2018-06-01 RX ADMIN — Medication 81 MILLIGRAM(S): at 12:43

## 2018-06-01 RX ADMIN — MIDODRINE HYDROCHLORIDE 10 MILLIGRAM(S): 2.5 TABLET ORAL at 06:31

## 2018-06-01 RX ADMIN — CLOPIDOGREL BISULFATE 75 MILLIGRAM(S): 75 TABLET, FILM COATED ORAL at 12:42

## 2018-06-01 RX ADMIN — Medication 1 MILLIGRAM(S): at 12:42

## 2018-06-01 RX ADMIN — Medication 10 MILLIGRAM(S): at 19:00

## 2018-06-01 RX ADMIN — MIDODRINE HYDROCHLORIDE 10 MILLIGRAM(S): 2.5 TABLET ORAL at 15:58

## 2018-06-01 RX ADMIN — SIMVASTATIN 40 MILLIGRAM(S): 20 TABLET, FILM COATED ORAL at 22:03

## 2018-06-01 RX ADMIN — MIDODRINE HYDROCHLORIDE 10 MILLIGRAM(S): 2.5 TABLET ORAL at 22:03

## 2018-06-01 RX ADMIN — Medication 25 MICROGRAM(S): at 06:31

## 2018-06-01 RX ADMIN — PREGABALIN 500 MICROGRAM(S): 225 CAPSULE ORAL at 12:43

## 2018-06-01 RX ADMIN — Medication 5 MILLIGRAM(S): at 19:01

## 2018-06-01 RX ADMIN — TIOTROPIUM BROMIDE 1 CAPSULE(S): 18 CAPSULE ORAL; RESPIRATORY (INHALATION) at 15:58

## 2018-06-01 RX ADMIN — Medication 100 MILLIGRAM(S): at 22:03

## 2018-06-01 RX ADMIN — BUDESONIDE AND FORMOTEROL FUMARATE DIHYDRATE 2 PUFF(S): 160; 4.5 AEROSOL RESPIRATORY (INHALATION) at 22:03

## 2018-06-01 RX ADMIN — FINASTERIDE 5 MILLIGRAM(S): 5 TABLET, FILM COATED ORAL at 12:43

## 2018-06-01 RX ADMIN — BUDESONIDE AND FORMOTEROL FUMARATE DIHYDRATE 2 PUFF(S): 160; 4.5 AEROSOL RESPIRATORY (INHALATION) at 13:02

## 2018-06-01 RX ADMIN — MONTELUKAST 10 MILLIGRAM(S): 4 TABLET, CHEWABLE ORAL at 12:43

## 2018-06-01 RX ADMIN — Medication 100 MILLIGRAM(S): at 12:42

## 2018-06-01 RX ADMIN — Medication 10 MILLIGRAM(S): at 06:31

## 2018-06-01 RX ADMIN — MIDODRINE HYDROCHLORIDE 10 MILLIGRAM(S): 2.5 TABLET ORAL at 00:23

## 2018-06-01 NOTE — CHART NOTE - NSCHARTNOTEFT_GEN_A_CORE
Reported by SANJUANA ruiz type I with 2:1 conduction lasting 2 minutes and then pt in and out of escape beats. Pt sleeping during this episode. Woke pt up and pt asymptomatic. Denies dizziness, diaphoresis, chest discomfort, shortness of breath.     B/P 88/48 recheck 98/62    06-01    141  |  103  |  20  ----------------------------<  44<LL>  4.2   |  22  |  0.66    Ca    9.4      01 Jun 2018 06:45  Phos  3.2     05-31  Mg     1.7     05-31    TPro  6.4  /  Alb  2.8<L>  /  TBili  0.2  /  DBili  x   /  AST  32  /  ALT  31  /  AlkPhos  214<H>  06-01                            9.0    5.5   )-----------( 193      ( 01 Jun 2018 10:20 )             26.7        Plan : EKG - joseph type 1 with escape beats HR at 51/mt           Discussed with Dr. Brady - continue tele monitoring           Follow BMP with mag    [id number]

## 2018-06-01 NOTE — PROGRESS NOTE ADULT - SUBJECTIVE AND OBJECTIVE BOX
nasal packing, by ent   no active epistaxis  REVIEW OF SYSTEMS:  CONSTITUTIONAL: No weakness,  no   fevers      RESPIRATORY:  No    shortness of breath  , no  wheezing  CARDIOVASCULAR: No chest pain,   no  palpitations, no  cough  GASTROINTESTINAL: No abdominal  pain, no  vomiting, no  diarrhea  NEUROLOGICAL: No  focal  weakness    MEDICATIONS  (STANDING):  amitriptyline 10 milliGRAM(s) Oral two times a day  aspirin enteric coated 81 milliGRAM(s) Oral daily  buDESOnide 160 MICROgram(s)/formoterol 4.5 MICROgram(s) Inhaler 2 Puff(s) Inhalation two times a day  clopidogrel Tablet 75 milliGRAM(s) Oral daily  cyanocobalamin 500 MICROGram(s) Oral daily  finasteride 5 milliGRAM(s) Oral daily  fluticasone propionate 50 MICROgram(s)/spray Nasal Spray 1 Spray(s) Both Nostrils two times a day  folic acid 1 milliGRAM(s) Oral daily  levothyroxine 25 MICROGram(s) Oral daily  midodrine 10 milliGRAM(s) Oral three times a day  montelukast 10 milliGRAM(s) Oral daily  phenytoin   Capsule 100 milliGRAM(s) Oral three times a day  polyethylene glycol 3350 17 Gram(s) Oral at bedtime  pyridoxine 100 milliGRAM(s) Oral daily  simvastatin 40 milliGRAM(s) Oral at bedtime  tamsulosin 0.4 milliGRAM(s) Oral at bedtime  tiotropium 18 MICROgram(s) Capsule 1 Capsule(s) Inhalation daily    MEDICATIONS  (PRN):  ALBUTerol    90 MICROgram(s) HFA Inhaler 1 Puff(s) Inhalation daily PRN Bronchospasm      Vital Signs Last 24 Hrs  T(C): 36.7 (01 Jun 2018 04:25), Max: 36.7 (01 Jun 2018 04:25)  T(F): 98.1 (01 Jun 2018 04:25), Max: 98.1 (01 Jun 2018 04:25)  HR: 89 (01 Jun 2018 04:25) (60 - 89)  BP: 105/59 (01 Jun 2018 04:25) (93/42 - 125/63)  BP(mean): --  RR: 19 (01 Jun 2018 04:25) (18 - 22)  SpO2: 95% (01 Jun 2018 04:25) (93% - 100%)  CAPILLARY BLOOD GLUCOSE      POCT Blood Glucose.: 46 mg/dL (01 Jun 2018 08:06)  POCT Blood Glucose.: 45 mg/dL (01 Jun 2018 08:05)  POCT Blood Glucose.: 78 mg/dL (31 May 2018 10:06)    I&O's Summary    31 May 2018 07:01  -  01 Jun 2018 07:00  --------------------------------------------------------  IN: 50 mL / OUT: 400 mL / NET: -350 mL        PHYSICAL EXAM:  HEAD:  Atraumatic, Normocephalic  NECK: Supple, No   JVD  CHEST/LUNG:   no     rales,     no,    rhonchi  HEART: Regular rate and rhythm;         murmur  ABDOMEN: Soft, Nontender, ;   EXTREMITIES:        edema  NEUROLOGY:  alert    LABS:                        8.8    4.97  )-----------( 164      ( 31 May 2018 20:37 )             26.6     06-01    141  |  103  |  20  ----------------------------<  44<LL>  4.2   |  22  |  0.66    Ca    9.4      01 Jun 2018 06:45  Phos  3.2     05-31  Mg     1.7     05-31    TPro  6.4  /  Alb  2.8<L>  /  TBili  0.2  /  DBili  x   /  AST  32  /  ALT  31  /  AlkPhos  214<H>  06-01    PT/INR - ( 31 May 2018 10:33 )   PT: 11.8 sec;   INR: 1.09 ratio         PTT - ( 31 May 2018 10:33 )  PTT:30.6 sec  CARDIAC MARKERS ( 31 May 2018 10:33 )  x     / 0.08 ng/mL / 106 U/L / x     / 11.3 ng/mL            05-31 @ 10:33  3.8  21    Hemoglobin A1C, Whole Blood: 4.7 % (05-27 @ 12:16)    Thyroid Stimulating Hormone, Serum: 10.92 uIU/mL (04-29 @ 08:03)          Consultant(s) Notes Reviewed:      Care Discussed with Consultants/Other Providers: right  nasal packing, by ent   no active epistaxis  REVIEW OF SYSTEMS:  CONSTITUTIONAL: No weakness,  no   fevers      RESPIRATORY:  No    shortness of breath  , no  wheezing  CARDIOVASCULAR: No chest pain,   no  palpitations, no  cough  GASTROINTESTINAL: No abdominal  pain, no  vomiting, no  diarrhea  NEUROLOGICAL: No  focal  weakness    MEDICATIONS  (STANDING):  amitriptyline 10 milliGRAM(s) Oral two times a day  aspirin enteric coated 81 milliGRAM(s) Oral daily  buDESOnide 160 MICROgram(s)/formoterol 4.5 MICROgram(s) Inhaler 2 Puff(s) Inhalation two times a day  clopidogrel Tablet 75 milliGRAM(s) Oral daily  cyanocobalamin 500 MICROGram(s) Oral daily  finasteride 5 milliGRAM(s) Oral daily  fluticasone propionate 50 MICROgram(s)/spray Nasal Spray 1 Spray(s) Both Nostrils two times a day  folic acid 1 milliGRAM(s) Oral daily  levothyroxine 25 MICROGram(s) Oral daily  midodrine 10 milliGRAM(s) Oral three times a day  montelukast 10 milliGRAM(s) Oral daily  phenytoin   Capsule 100 milliGRAM(s) Oral three times a day  polyethylene glycol 3350 17 Gram(s) Oral at bedtime  pyridoxine 100 milliGRAM(s) Oral daily  simvastatin 40 milliGRAM(s) Oral at bedtime  tamsulosin 0.4 milliGRAM(s) Oral at bedtime  tiotropium 18 MICROgram(s) Capsule 1 Capsule(s) Inhalation daily    MEDICATIONS  (PRN):  ALBUTerol    90 MICROgram(s) HFA Inhaler 1 Puff(s) Inhalation daily PRN Bronchospasm      Vital Signs Last 24 Hrs  T(C): 36.7 (01 Jun 2018 04:25), Max: 36.7 (01 Jun 2018 04:25)  T(F): 98.1 (01 Jun 2018 04:25), Max: 98.1 (01 Jun 2018 04:25)  HR: 89 (01 Jun 2018 04:25) (60 - 89)  BP: 105/59 (01 Jun 2018 04:25) (93/42 - 125/63)  BP(mean): --  RR: 19 (01 Jun 2018 04:25) (18 - 22)  SpO2: 95% (01 Jun 2018 04:25) (93% - 100%)  CAPILLARY BLOOD GLUCOSE      POCT Blood Glucose.: 46 mg/dL (01 Jun 2018 08:06)  POCT Blood Glucose.: 45 mg/dL (01 Jun 2018 08:05)  POCT Blood Glucose.: 78 mg/dL (31 May 2018 10:06)    I&O's Summary    31 May 2018 07:01  -  01 Jun 2018 07:00  --------------------------------------------------------  IN: 50 mL / OUT: 400 mL / NET: -350 mL        PHYSICAL EXAM:  HEAD:  Atraumatic, Normocephalic  NECK: Supple, No   JVD  CHEST/LUNG:   no     rales,     no,    rhonchi  HEART: Regular rate and rhythm;         murmur  ABDOMEN: Soft, Nontender, ;   EXTREMITIES:        edema  NEUROLOGY:  alert    LABS:                        8.8    4.97  )-----------( 164      ( 31 May 2018 20:37 )             26.6     06-01    141  |  103  |  20  ----------------------------<  44<LL>  4.2   |  22  |  0.66    Ca    9.4      01 Jun 2018 06:45  Phos  3.2     05-31  Mg     1.7     05-31    TPro  6.4  /  Alb  2.8<L>  /  TBili  0.2  /  DBili  x   /  AST  32  /  ALT  31  /  AlkPhos  214<H>  06-01    PT/INR - ( 31 May 2018 10:33 )   PT: 11.8 sec;   INR: 1.09 ratio         PTT - ( 31 May 2018 10:33 )  PTT:30.6 sec  CARDIAC MARKERS ( 31 May 2018 10:33 )  x     / 0.08 ng/mL / 106 U/L / x     / 11.3 ng/mL            05-31 @ 10:33  3.8  21    Hemoglobin A1C, Whole Blood: 4.7 % (05-27 @ 12:16)    Thyroid Stimulating Hormone, Serum: 10.92 uIU/mL (04-29 @ 08:03)          Consultant(s) Notes Reviewed:      Care Discussed with Consultants/Other Providers: right  nasal packing, by ent   no active epistaxis    isauro chandra  REVIEW OF SYSTEMS:  CONSTITUTIONAL: No weakness,  no   fevers      RESPIRATORY:  No    shortness of breath  , no  wheezing  CARDIOVASCULAR: No chest pain,   no  palpitations, no  cough  GASTROINTESTINAL: No abdominal  pain, no  vomiting, no  diarrhea  NEUROLOGICAL: No  focal  weakness    MEDICATIONS  (STANDING):  amitriptyline 10 milliGRAM(s) Oral two times a day  aspirin enteric coated 81 milliGRAM(s) Oral daily  buDESOnide 160 MICROgram(s)/formoterol 4.5 MICROgram(s) Inhaler 2 Puff(s) Inhalation two times a day  clopidogrel Tablet 75 milliGRAM(s) Oral daily  cyanocobalamin 500 MICROGram(s) Oral daily  finasteride 5 milliGRAM(s) Oral daily  fluticasone propionate 50 MICROgram(s)/spray Nasal Spray 1 Spray(s) Both Nostrils two times a day  folic acid 1 milliGRAM(s) Oral daily  levothyroxine 25 MICROGram(s) Oral daily  midodrine 10 milliGRAM(s) Oral three times a day  montelukast 10 milliGRAM(s) Oral daily  phenytoin   Capsule 100 milliGRAM(s) Oral three times a day  polyethylene glycol 3350 17 Gram(s) Oral at bedtime  pyridoxine 100 milliGRAM(s) Oral daily  simvastatin 40 milliGRAM(s) Oral at bedtime  tamsulosin 0.4 milliGRAM(s) Oral at bedtime  tiotropium 18 MICROgram(s) Capsule 1 Capsule(s) Inhalation daily    MEDICATIONS  (PRN):  ALBUTerol    90 MICROgram(s) HFA Inhaler 1 Puff(s) Inhalation daily PRN Bronchospasm      Vital Signs Last 24 Hrs  T(C): 36.7 (01 Jun 2018 04:25), Max: 36.7 (01 Jun 2018 04:25)  T(F): 98.1 (01 Jun 2018 04:25), Max: 98.1 (01 Jun 2018 04:25)  HR: 89 (01 Jun 2018 04:25) (60 - 89)  BP: 105/59 (01 Jun 2018 04:25) (93/42 - 125/63)  BP(mean): --  RR: 19 (01 Jun 2018 04:25) (18 - 22)  SpO2: 95% (01 Jun 2018 04:25) (93% - 100%)  CAPILLARY BLOOD GLUCOSE      POCT Blood Glucose.: 46 mg/dL (01 Jun 2018 08:06)  POCT Blood Glucose.: 45 mg/dL (01 Jun 2018 08:05)  POCT Blood Glucose.: 78 mg/dL (31 May 2018 10:06)    I&O's Summary    31 May 2018 07:01  -  01 Jun 2018 07:00  --------------------------------------------------------  IN: 50 mL / OUT: 400 mL / NET: -350 mL        PHYSICAL EXAM:  HEAD:  Atraumatic, Normocephalic  NECK: Supple, No   JVD  CHEST/LUNG:   no     rales,     no,    rhonchi  HEART: Regular rate and rhythm;         murmur  ABDOMEN: Soft, Nontender, ;   EXTREMITIES:        edema  NEUROLOGY:  alert    LABS:                        8.8    4.97  )-----------( 164      ( 31 May 2018 20:37 )             26.6     06-01    141  |  103  |  20  ----------------------------<  44<LL>  4.2   |  22  |  0.66    Ca    9.4      01 Jun 2018 06:45  Phos  3.2     05-31  Mg     1.7     05-31    TPro  6.4  /  Alb  2.8<L>  /  TBili  0.2  /  DBili  x   /  AST  32  /  ALT  31  /  AlkPhos  214<H>  06-01    PT/INR - ( 31 May 2018 10:33 )   PT: 11.8 sec;   INR: 1.09 ratio         PTT - ( 31 May 2018 10:33 )  PTT:30.6 sec  CARDIAC MARKERS ( 31 May 2018 10:33 )  x     / 0.08 ng/mL / 106 U/L / x     / 11.3 ng/mL            05-31 @ 10:33  3.8  21    Hemoglobin A1C, Whole Blood: 4.7 % (05-27 @ 12:16)    Thyroid Stimulating Hormone, Serum: 10.92 uIU/mL (04-29 @ 08:03)          Consultant(s) Notes Reviewed:      Care Discussed with Consultants/Other Providers:

## 2018-06-01 NOTE — PROGRESS NOTE ADULT - SUBJECTIVE AND OBJECTIVE BOX
- Patient seen and examined.  - In summary, patient is a 82 year old man who presented with hemoptysis (31 May 2018 14:01)  - Today, patient is without complaints.         *****MEDICATIONS:    MEDICATIONS  (STANDING):  amitriptyline 10 milliGRAM(s) Oral two times a day  aspirin enteric coated 81 milliGRAM(s) Oral daily  buDESOnide 160 MICROgram(s)/formoterol 4.5 MICROgram(s) Inhaler 2 Puff(s) Inhalation two times a day  clopidogrel Tablet 75 milliGRAM(s) Oral daily  cyanocobalamin 500 MICROGram(s) Oral daily  finasteride 5 milliGRAM(s) Oral daily  fluticasone propionate 50 MICROgram(s)/spray Nasal Spray 1 Spray(s) Both Nostrils two times a day  folic acid 1 milliGRAM(s) Oral daily  levothyroxine 25 MICROGram(s) Oral daily  midodrine 10 milliGRAM(s) Oral three times a day  montelukast 10 milliGRAM(s) Oral daily  phenytoin   Capsule 100 milliGRAM(s) Oral three times a day  polyethylene glycol 3350 17 Gram(s) Oral at bedtime  pyridoxine 100 milliGRAM(s) Oral daily  simvastatin 40 milliGRAM(s) Oral at bedtime  tamsulosin 0.4 milliGRAM(s) Oral at bedtime  tiotropium 18 MICROgram(s) Capsule 1 Capsule(s) Inhalation daily    MEDICATIONS  (PRN):  ALBUTerol    90 MICROgram(s) HFA Inhaler 1 Puff(s) Inhalation daily PRN Bronchospasm  bisacodyl 5 milliGRAM(s) Oral daily PRN Constipation           ***** REVIEW OF SYSTEM:  GEN: no fever, no chills, no pain  RESP: no SOB, no cough, no sputum  CVS: no chest pain, no palpitations, no edema  GI: no abdominal pain, no nausea, no vomiting, no constipation, no diarrhea  : no dysuria, no frequency  NEURO: no headache, no dizziness  PSYCH: no depression, not anxious  Derm : no itching, no rash         ***** VITAL SIGNS:  T(F): 97.9 (18 @ 11:35), Max: 98.1 (18 @ 04:25)  HR: 60 (18 @ 12:22) (51 - 89)  BP: 98/52 (18 @ 12:22) (88/47 - 125/63)  RR: 19 (18 @ 11:35) (19 - 20)  SpO2: 97% (18 @ 11:35) (95% - 100%)  Wt(kg): --  ,   I&O's Summary    31 May 2018 07:01  -  2018 07:00  --------------------------------------------------------  IN: 50 mL / OUT: 400 mL / NET: -350 mL      -  2018 15:42  --------------------------------------------------------  IN: 592 mL / OUT: 200 mL / NET: 392 mL             *****PHYSICAL EXAM:  GEN: A&O X 3 , NAD , comfortable  HEENT: NCAT, EOMI, MMM, no icterus  NECK: Supple, No JVD  CVS: S1S2 , regular , No M/R/G appreciated  PULM: CTA B/L,  no W/R/R appreciated  ABD.: soft. non tender, non distended,  bowel sounds present  Extrem: intact pulses , no edema noted  Derm: No rash or ecchymosis noted  PSYCH: normal mood, no depression, not anxious         *****LAB AND IMAGIN.0    5.5   )-----------( 193      ( 2018 10:20 )             26.7               06-    141  |  103  |  20  ----------------------------<  44<LL>  4.2   |  22  |  0.66    Ca    9.4      2018 06:45  Phos  3.2     05-31  Mg     1.7     05-31    TPro  6.4  /  Alb  2.8<L>  /  TBili  0.2  /  DBili  x   /  AST  32  /  ALT  31  /  AlkPhos  214<H>  06-01    PT/INR - ( 31 May 2018 10:33 )   PT: 11.8 sec;   INR: 1.09 ratio         PTT - ( 31 May 2018 10:33 )  PTT:30.6 sec       CARDIAC MARKERS ( 31 May 2018 10:33 )  x     / 0.08 ng/mL / 106 U/L / x     / 11.3 ng/mL                [All pertinent recent Imaging/Reports reviewed]         *****A S S E S S M E N T   A N D   P L A N :  82M  with CAD s/p recent stents orthostatic hypotension who presented with hemoptyses  mobitz 1. HB on tele  pt asymptomatic with no hx of dizziness or syncope.  no on AVN affecting meds  dilantin level  continue asa and Plavix s/p pci  continue midodrine  f/u ent/pulmonary  po as allowable      __________________________  POLLO Brady D.O.

## 2018-06-01 NOTE — PROGRESS NOTE ADULT - ASSESSMENT
84 y/o Esophageal Ca. CAD recent admission with stent placement orthostatic hypotension, hypothyroidism,  dilantin overdose admitted with fatigue and epistasis. Patient on chronic steroids for RA, held while NPO noted with am hypoglycemia.    Patient on chronic treatment with steroids for RA, 5-10 mg daily which patient adjusts. During his past admission he was noted with hypoglycemia and hypotension following a cardiac procedure. He was discharged on prednisone 5 mg a day with instructions for stress dose steroid adjustment as out patient. He was again admitted with epistacsis and anemia. As GI loss was suspected he was NPO for 24 hours.   Hypoglycemia this am to 44 mg/dL after NPO and no steroids.      Hypothyroidism treated with low dose synthroid 25/50 mcg ADT.

## 2018-06-01 NOTE — CHART NOTE - NSCHARTNOTEFT_GEN_A_CORE
Reported Blood sugar of 44 from BMP, FSBS - 44 and 45 - Pt asymptomatic and is NPO for ENT and Pulmonary clearance  Per pulmonary no planned bronchoscopy and per post laryngoscopy s/p packing for epistaxis post laryngoscope. Cleared to feed by ENT  Discussed with Dr. Brady and Dr. Hinton. Pt now cleared to be fed  Dextrose 50% 1 amp X 1  [id number]

## 2018-06-01 NOTE — CONSULT NOTE ADULT - SUBJECTIVE AND OBJECTIVE BOX
Neurology Consult Note    Patient is a 82y old  Male who presents with a chief complaint of ?heart block/hemoptysis (31 May 2018 14:01)      The patient is a 82y Male with a history of HLD, HT, Ca, strk, sz,  recent admission for dilantin toxicity after weight loss- readmittted for bleeding ? nasal-  here to f/u on dilantin dosing     MEDICATIONS  (STANDING):  amitriptyline 10 milliGRAM(s) Oral two times a day  aspirin enteric coated 81 milliGRAM(s) Oral daily  buDESOnide 160 MICROgram(s)/formoterol 4.5 MICROgram(s) Inhaler 2 Puff(s) Inhalation two times a day  clopidogrel Tablet 75 milliGRAM(s) Oral daily  cyanocobalamin 500 MICROGram(s) Oral daily  finasteride 5 milliGRAM(s) Oral daily  fluticasone propionate 50 MICROgram(s)/spray Nasal Spray 1 Spray(s) Both Nostrils two times a day  folic acid 1 milliGRAM(s) Oral daily  levothyroxine 25 MICROGram(s) Oral daily  midodrine 10 milliGRAM(s) Oral three times a day  montelukast 10 milliGRAM(s) Oral daily  phenytoin   Capsule 100 milliGRAM(s) Oral three times a day  polyethylene glycol 3350 17 Gram(s) Oral at bedtime  pyridoxine 100 milliGRAM(s) Oral daily  simvastatin 40 milliGRAM(s) Oral at bedtime  tamsulosin 0.4 milliGRAM(s) Oral at bedtime  tiotropium 18 MICROgram(s) Capsule 1 Capsule(s) Inhalation daily    MEDICATIONS  (PRN):  ALBUTerol    90 MICROgram(s) HFA Inhaler 1 Puff(s) Inhalation daily PRN Bronchospasm      PAST MEDICAL & SURGICAL HISTORY:  Coronary artery disease: s/p 3 stents on June 30, 2017 at [place] Dr. Lavelle Reid  OA (osteoarthritis) of knee: right  Former smoker, stopped smoking in distant past  Rheumatoid arthritis  Seizure disorder: 1 episode approximately 15 yrs ago  Asthma  Hyperlipidemia  BPH (benign prostatic hyperplasia)  HTN (hypertension)  Esophagus cancer  Chronic allergic rhinitis  BCC (basal cell carcinoma): skin  Cerebrovascular accident (CVA)  Anxiety  Anastomotic leak following esophagectomy: [facility] esophagectomy  History of tonsillectomy  H/O heart artery stent: June 30, 2017  Knee arthropathy: left  History of total hip replacement: left  History of hernia repair      FAMILY HISTORY:  Family history of heart attack  Family history of pulmonary embolism: father @ 49 yr old  FH: CAD (coronary artery disease) (Sibling)      Allergies    penicillin (Rash)    Intolerances        SOCIAL HISTORY:  type ~  then ?H&P    REVIEW OF SYSTEMS  General:(-),Skin/Breast:(-),Ophthalmologic:(-),ENMT:(-),Respiratory and Thorax:(-),Cardiovascular:(-),Gastrointestinal:(-),Genitourinary:(-),Musculoskeletal:(-),Psychiatric:(-),Hematology/Lymphatics:(-),Endocrine:(-),Allergic/Immunologic:(-)    Vital Signs Last 24 Hrs  T(C): 36.7 (01 Jun 2018 04:25), Max: 36.7 (01 Jun 2018 04:25)  T(F): 98.1 (01 Jun 2018 04:25), Max: 98.1 (01 Jun 2018 04:25)  HR: 89 (01 Jun 2018 04:25) (60 - 89)  BP: 105/59 (01 Jun 2018 04:25) (93/42 - 125/63)  BP(mean): --  RR: 19 (01 Jun 2018 04:25) (18 - 22)  SpO2: 95% (01 Jun 2018 04:25) (93% - 100%)    Physical exam  GENERAL-WDWN  CARDIOVASCULAR- Cor- RRR s MGR, carotid- 2+, No bruits  EYES- non-icteric disks obscured  MENTAL STATUS- Alert, attends, fluent, non-dysarthric, follows commands, oriented, good memory and affect.  CRANIAL NERVES-II Optic - Bilateral - Visual Fields- R sup Qdtopsia. III-IV-VI EOMI & Pupils - Bilateral - Normal Bilaterally. V Trigeminal - Bilateral - Normal bilateral facial sensation and jaw movement. VII Facial - Normal bilateral facial movement. VIII Acoustic - Bilateral - Hearing normal to voice bilaterally. IX Glossopharyngeal / X Vagus - Normal palate elevates symmetrically. XI Accessory - Normal Bilaterally. XII Hypoglossal - Tongue protrudes midline and moves symmetrically.  MOTOR-Bulk and Contour - Normal. Tone - Normal tone, no abnormal movements. Strength - 5/5 normal muscle strength - Strength full throughout.  SENSORY-Normal - LT, DSS, Vibration.  REFLEXES- DTR's 2+ throughout.  COORDINATION-Normal FFM, BENI, FNF - bilaterally.  GAIT-NA    CBC Full  -  ( 31 May 2018 20:37 )  WBC Count : 4.97 K/uL  Hemoglobin : 8.8 g/dL  Hematocrit : 26.6 %  Platelet Count - Automated : 164 K/uL  Mean Cell Volume : 95.3 fl  Mean Cell Hemoglobin : 31.5 pg  Mean Cell Hemoglobin Concentration : 33.1 gm/dL  Auto Neutrophil # : x  Auto Lymphocyte # : x  Auto Monocyte # : x  Auto Eosinophil # : x  Auto Basophil # : x  Auto Neutrophil % : x  Auto Lymphocyte % : x  Auto Monocyte % : x  Auto Eosinophil % : x  Auto Basophil % : x      06-01    141  |  103  |  20  ----------------------------<  44<LL>  4.2   |  22  |  0.66    Ca    9.4      01 Jun 2018 06:45  Phos  3.2     05-31  Mg     1.7     05-31    TPro  6.4  /  Alb  2.8<L>  /  TBili  0.2  /  DBili  x   /  AST  32  /  ALT  31  /  AlkPhos  214<H>  06-01    LIVER FUNCTIONS - ( 01 Jun 2018 06:45 )  Alb: 2.8 g/dL / Pro: 6.4 g/dL / ALK PHOS: 214 U/L / ALT: 31 U/L / AST: 32 U/L / GGT: x           Phenytoin Level, Serum: 14.1 ug/mL (05-31 @ 10:33)

## 2018-06-01 NOTE — DIETITIAN INITIAL EVALUATION ADULT. - NS AS NUTRI INTERV COLLABORAT
Collaboration with other providers/Malnutrition alert in chart, team aware. RD remains available to monitor PO intake, wt, labs.

## 2018-06-01 NOTE — PROGRESS NOTE ADULT - PROBLEM SELECTOR PLAN 2
Low FS at am are the result of poor nutrition state and adrenal insufficiency.
duoneb via HHN q 6h  symbicort 160 n2 bid  spiriva 1 puff q day  pulmonary toilet-incentive spirometry, Chest Ju-[id number]-sx to 5 times per day.    maintain oxygen levels above 90%-supplemental oxygen via nasal canula-humidified    All nebulized therapy is to be administered via hand held nebulizer-(patient must gargle and spit with water after use)

## 2018-06-01 NOTE — PROGRESS NOTE ADULT - PROBLEM SELECTOR PLAN 3
Synthroid 25/50 mcg ADT.
non massive hemoptysis-in pt on plavix/asa--CTPA is neg for PE or EB dz--would try not to bronch at present time and focus on anti tussive rx  f/up H/H

## 2018-06-01 NOTE — PROGRESS NOTE ADULT - ASSESSMENT
82M with h/o HTN, Seizure d/o, Rheumatoid arthritis, Asthma, CAD s/p PCI x 5 in the past with last two stents in 4/2018 on ASA/Plavix, Esophageal CA s/p esophagectomy in 8/14/2017 presents with multiple episodes of hemoptysis.    - Patient is hemodynamically stable with hemoglobin in his range.  - Given recent drug eluting stents, patient should remain on ASA/Plavix for now.  - Continue to monitor counts and watch for further episodes of hemoptysis  - Recommend Hycodan 5 mL PO q 6 hours to suppress cough.  - Continue Spiriva and Duonebs q 6 prn.  - Recommend Acapella device to bedside to facilitate mucous clearance.  - No indication for bronchoscopy at this time. 82M with h/o HTN, Seizure d/o, Rheumatoid arthritis, Asthma, CAD s/p PCI x 5 in the past with last two stents in 4/2018 on ASA/Plavix, Esophageal CA s/p esophagectomy in 8/14/2017 presents with multiple episodes of hemoptysis.    - Patient is hemodynamically stable with hemoglobin in his range.  - Given recent drug eluting stents, patient should remain on ASA/Plavix for now.  - Continue to monitor counts and watch for further episodes of hemoptysis  - Recommend Hycodan 5 mL PO q 6 hours to suppress cough.  - Continue Spiriva and Duonebs q 6   - Recommend Acapella device to bedside to facilitate mucous clearance.  - No indication for bronchoscopy at this time.

## 2018-06-01 NOTE — DIETITIAN INITIAL EVALUATION ADULT. - SOURCE
Pt's wife at bedside, RN, Medical record, Previous RD note/other (specify)/family/significant other/patient

## 2018-06-01 NOTE — PROGRESS NOTE ADULT - SUBJECTIVE AND OBJECTIVE BOX
Chief Complaint: 82 y/o Esophageal Ca. CAD recent admission with stent placement orthostatic hypotension, hypothyroidism,  dilantin overdose admitted with fatigue and epistasis. Patient on chronic steroids for RA, held while NPO noted with am hypoglycemia.    Patient on chronic treatment with steroids for RA, 5-10 mg daily which patient adjusts. During his past admission he was noted with hypoglycemia and hypotension following a cardiac procedure. He was discharged on prednisone 5 mg a day with instructions for stress dose steroid adjustment as out patient. He was again admitted with epistacsis and anemia. As GI loss was suspected he was NPO for 24 hours.   Hypoglycemia this am to 44 mg/dL after NPO and no steroids.      Hypothyroidism treated with low dose synthroid 25/50 mcg ADT.        MEDICATIONS  (STANDING):  amitriptyline 10 milliGRAM(s) Oral two times a day  aspirin enteric coated 81 milliGRAM(s) Oral daily  buDESOnide 160 MICROgram(s)/formoterol 4.5 MICROgram(s) Inhaler 2 Puff(s) Inhalation two times a day  clopidogrel Tablet 75 milliGRAM(s) Oral daily  cyanocobalamin 500 MICROGram(s) Oral daily  finasteride 5 milliGRAM(s) Oral daily  fluticasone propionate 50 MICROgram(s)/spray Nasal Spray 1 Spray(s) Both Nostrils two times a day  folic acid 1 milliGRAM(s) Oral daily  levothyroxine 25 MICROGram(s) Oral daily  midodrine 10 milliGRAM(s) Oral three times a day  montelukast 10 milliGRAM(s) Oral daily  phenytoin   Capsule 100 milliGRAM(s) Oral three times a day  polyethylene glycol 3350 17 Gram(s) Oral at bedtime  pyridoxine 100 milliGRAM(s) Oral daily  simvastatin 40 milliGRAM(s) Oral at bedtime  tamsulosin 0.4 milliGRAM(s) Oral at bedtime  tiotropium 18 MICROgram(s) Capsule 1 Capsule(s) Inhalation daily    MEDICATIONS  (PRN):  ALBUTerol    90 MICROgram(s) HFA Inhaler 1 Puff(s) Inhalation daily PRN Bronchospasm  bisacodyl 5 milliGRAM(s) Oral daily PRN Constipation      Allergies    penicillin (Rash)    Intolerances        PHYSICAL EXAM:  VITALS: T(C): 36.2 (06-01-18 @ 16:00)  T(F): 97.2 (06-01-18 @ 16:00), Max: 98.1 (06-01-18 @ 04:25)  HR: 74 (06-01-18 @ 16:00) (51 - 89)  BP: 108/70 (06-01-18 @ 16:00) (88/47 - 125/63)  RR:  (18 - 20)  SpO2:  (95% - 100%)  Wt(lb): --123  GENERAL: NAD, alert, responsive.  EYES: No proptosis,  HEENT:  Atraumatic, Normocephalic,   THYROID: Normal size, no palpable nodules  RESPIRATORY: Clear to auscultation bilaterally  CARDIOVASCULAR: Regular rhythm; No murmurs; no peripheral edema  GI: Soft, diffuse tenderness, non distended, normal bowel sounds  SKIN: Dry, intact, No rashes or lesions  MUSCULOSKELETAL: reduced strength  NEURO: extraocular movements intact, no tremor, decreased reflexes  PSYCH: Alert and oriented x 3, normal affect, normal mood      POCT Blood Glucose.: 132 mg/dL (06-01-18 @ 08:45)  POCT Blood Glucose.: 46 mg/dL (06-01-18 @ 08:06)  POCT Blood Glucose.: 45 mg/dL (06-01-18 @ 08:05)  POCT Blood Glucose.: 78 mg/dL (05-31-18 @ 10:06)                            9.0    5.5   )-----------( 193      ( 01 Jun 2018 10:20 )             26.7       06-01    141  |  103  |  20  ----------------------------<  44<LL>  4.2   |  22  |  0.66    EGFR if : 104  EGFR if non : 90    Ca    9.4      06-01  Mg     1.7     05-31  Phos  3.2     05-31    TPro  6.4  /  Alb  2.8<L>  /  TBili  0.2  /  DBili  x   /  AST  32  /  ALT  31  /  AlkPhos  214<H>  06-01      Thyroid Function Tests:  06-01 @ 10:50 TSH 3.62 FreeT4 -- T3 -- Anti TPO -- Anti Thyroglobulin Ab -- TSI --      Hemoglobin A1C, Whole Blood: 4.7 % [4.0 - 5.6] (05-27-18 @ 12:16)  Hemoglobin A1C, Whole Blood: 4.7 % [4.0 - 5.6] (05-27-18 @ 12:16)

## 2018-06-01 NOTE — DIETITIAN INITIAL EVALUATION ADULT. - OTHER INFO
Nutrition consult received for BMI >18kg/m2. Pt seen sitting in bed with 75% of breakfast consumed thus far. Pt states he was usually around and has lost about 40lbs since 9/2017. Pt is currently 128lbs. Noted previous RD note 5/2018, Pt was 137.5lbs which Pt denies to be accurate. Pt taakes Folic acid, Vit B6, and Vit B12 PTA. Pt drinks Ensure enlive 1-2x daily depending on appetite. Pt denies to offer food preferences at this time, states "i'm just ready to go home". Pt denies GI distress ,denies chewing difficultly. NKFA

## 2018-06-01 NOTE — DIETITIAN INITIAL EVALUATION ADULT. - ORAL INTAKE PTA
good/Pt reports a good PO intake PTA. Breakfast: cream cheese omelet, Lunch: Soup, ham, egg salad, left overs. Dinner: meat, potato, soup, vegetables. Drinks water, decaf tea and Ensure enlive 1-2x daily

## 2018-06-01 NOTE — PROGRESS NOTE ADULT - ASSESSMENT
pt with mult medical hx with ashd s/p recent stent LADand D1 ,orthostatic hypotension who presented with hemoptyses blood tinged cough, adn in er with possible mobitz 1.  pt asymptomatic with no hx of dizziness or syncope.  tsh  dilantin level  continue asa and Plavix  continue midodrine  ent  saw  pt,  nasal packing/epistaxis  tele,  mobitz  type 1 pt with mult medical hx with ashd s/p recent stent LADand D1 ,orthostatic hypotension who presented with hemoptyses blood tinged cough, adn in er with possible mobitz 1.  pt asymptomatic with no hx of dizziness or syncope.  tsh  dilantin level  continue asa and Plavix  continue midodrine  ent  saw  pt,  nasal packing/epistaxis  tele,  mobitz  type 1  hypoglycemia, this am, corrected , spoke  with [place] pt with mult medical hx with ashd s/p recent stent LADand D1 ,orthostatic hypotension who presented with hemoptyses blood tinged cough, adn in er with possible mobitz 1.  pt asymptomatic with no hx of dizziness or syncope.  tsh  dilantin level  continue asa and Plavix  continue midodrine  ent  saw  pt,  nasal packing/epistaxis  tele,  mobitz  type 1  hypoglycemia, this am, corrected , spoke  with rn, follow  fs pt with mult medical hx with ashd s/p recent stent LADand D1 ,orthostatic hypotension who presented with hemoptyses blood tinged cough, adn in er with possible mobitz 1.  pt asymptomatic with no hx of dizziness or syncope.  tsh  dilantin level  continue asa and Plavix  continue midodrine  ent  saw  pt,  nasal packing/epistaxis  tele,  mobitz  type 1, no intervention per dars  hypoglycemia, this am, corrected , spoke  with rn, follow  fs

## 2018-06-01 NOTE — PROGRESS NOTE ADULT - ATTENDING COMMENTS
as above-  Non massive hemoptysis--quantify amount--no role at present for fob, CTPA c/w mucus in airway (no PNA)  bronchodilatpr rx/acapella  f/up H/H-drop not likely due to bleed  Cards f/up--on asa/plavix etc.    William Juarez MD-Pulmonary   [phone]

## 2018-06-01 NOTE — DIETITIAN INITIAL EVALUATION ADULT. - FACTORS AFF FOOD INTAKE
Pt with history of esophageal cancer with esophagectomy. Pt states when swallowing foods get stuck. Pt has swallow evaluation on 5/2018, recommended regular diet./none/other (specify)

## 2018-06-01 NOTE — DIETITIAN INITIAL EVALUATION ADULT. - PHYSICAL APPEARANCE
Pt getting blood draw during time of interview, Nutrition focused physical exam deferred at this time. Pt visually appears cachetic/underweight

## 2018-06-01 NOTE — DIETITIAN INITIAL EVALUATION ADULT. - ENERGY NEEDS
Ht: 5'3", Wt: 128lbs, BMI: 2.6kg/m2, IBW: 124lbs(+/-10%)100%IBW  Pertinent information: Pt admitted for hemoptysis, Per chart, Pt with history of CAD with PCI x5, esophageal cancer. Pt being followed by ENT.   +2 lori foot Edema, Skin intact

## 2018-06-01 NOTE — PROGRESS NOTE ADULT - SUBJECTIVE AND OBJECTIVE BOX
CHIEF COMPLAINT:    Interval Events:    REVIEW OF SYSTEMS:  Constitutional: No fevers or chills. No weight loss. No fatigue or generalized malaise.  Eyes: No itching or discharge from the eyes  ENT: No ear pain. No ear discharge. No nasal congestion. No post nasal drip. No epistaxis. No throat pain. No sore throat. No difficulty swallowing.   CV: No chest pain. No palpitations. No lightheadedness or dizziness.   Resp: No dyspnea at rest. No dyspnea on exertion. No orthopnea. No wheezing. No cough. No stridor. No sputum production. No chest pain with respiration.  GI: No nausea. No vomiting. No diarrhea.  MSK: No joint pain or pain in any extremities  Integumentary: No skin lesions. No pedal edema.  Neurological: No gross motor weakness. No sensory changes.  [ ] All other systems negative  [ ] Unable to assess ROS because ________    OBJECTIVE:  ICU Vital Signs Last 24 Hrs  T(C): 36.7 (01 Jun 2018 04:25), Max: 36.7 (01 Jun 2018 04:25)  T(F): 98.1 (01 Jun 2018 04:25), Max: 98.1 (01 Jun 2018 04:25)  HR: 89 (01 Jun 2018 04:25) (60 - 89)  BP: 105/59 (01 Jun 2018 04:25) (93/42 - 125/63)  BP(mean): --  ABP: --  ABP(mean): --  RR: 19 (01 Jun 2018 04:25) (18 - 22)  SpO2: 95% (01 Jun 2018 04:25) (93% - 100%)        05-31 @ 07:01  -  06-01 @ 04:46  --------------------------------------------------------  IN: 0 mL / OUT: 100 mL / NET: -100 mL      CAPILLARY BLOOD GLUCOSE      POCT Blood Glucose.: 78 mg/dL (31 May 2018 10:06)      PHYSICAL EXAM:  General: Awake, alert, oriented X 3.   HEENT: Atraumatic, normocephalic.                 Mallampatti Grade                 No nasal congestion.                No tonsillar or pharyngeal exudates.  Lymph Nodes: No palpable lymphadenopathy  Neck: No JVD. No carotid bruit.   Respiratory: Normal chest expansion                         Normal percussion                         Normal and equal air entry                         No wheeze, rhonchi or rales.  Cardiovascular: S1 S2 normal. No murmurs, rubs or gallops.   Abdomen: Soft, non-tender, non-distended. No organomegaly.  Extremities: Warm to touch. Peripheral pulse palpable. No pedal edema.   Skin: No rashes or skin lesions  Neurological: Motor and sensory examination equal and normal in all four extremities.  Psychiatry: Appropriate mood and affect.    HOSPITAL MEDICATIONS:  MEDICATIONS  (STANDING):  amitriptyline 10 milliGRAM(s) Oral two times a day  aspirin enteric coated 81 milliGRAM(s) Oral daily  clopidogrel Tablet 75 milliGRAM(s) Oral daily  cyanocobalamin 500 MICROGram(s) Oral daily  finasteride 5 milliGRAM(s) Oral daily  folic acid 1 milliGRAM(s) Oral daily  levothyroxine 25 MICROGram(s) Oral daily  midodrine 10 milliGRAM(s) Oral three times a day  montelukast 10 milliGRAM(s) Oral daily  phenytoin   Capsule 100 milliGRAM(s) Oral three times a day  polyethylene glycol 3350 17 Gram(s) Oral at bedtime  pyridoxine 100 milliGRAM(s) Oral daily  simvastatin 40 milliGRAM(s) Oral at bedtime  tamsulosin 0.4 milliGRAM(s) Oral at bedtime  tiotropium 18 MICROgram(s) Capsule 1 Capsule(s) Inhalation daily    MEDICATIONS  (PRN):  ALBUTerol    90 MICROgram(s) HFA Inhaler 1 Puff(s) Inhalation daily PRN Bronchospasm      LABS:                        8.8    4.97  )-----------( 164      ( 31 May 2018 20:37 )             26.6     05-31    141  |  104  |  16  ----------------------------<  86  4.3   |  28  |  0.65    Ca    9.1      31 May 2018 10:33  Phos  3.2     05-31  Mg     1.7     05-31    TPro  6.6  /  Alb  3.0<L>  /  TBili  0.3  /  DBili  x   /  AST  52<H>  /  ALT  37  /  AlkPhos  224<H>  05-31    PT/INR - ( 31 May 2018 10:33 )   PT: 11.8 sec;   INR: 1.09 ratio         PTT - ( 31 May 2018 10:33 )  PTT:30.6 sec      Venous Blood Gas:  05-31 @ 10:33  7.36/54/21/30/27  VBG Lactate: 1.1      MICROBIOLOGY:     RADIOLOGY:  [ ] Reviewed and interpreted by me    Point of Care Ultrasound Findings:    PFT:    EKG: CHIEF COMPLAINT: follow up for hemoptysis and sob--total of 9-10 teaspoons; no sob     Interval Events: CTPA neg for EB tumor--mucus noted    REVIEW OF SYSTEMS:  Constitutional: No fevers or chills. No weight loss. + fatigue or generalized malaise.  Eyes: No itching or discharge from the eyes  ENT: No ear pain. No ear discharge. No nasal congestion. No post nasal drip. No epistaxis. No throat pain. No sore throat. No difficulty swallowing.   CV: No chest pain. No palpitations. No lightheadedness or dizziness.   Resp: No dyspnea at rest. No dyspnea on exertion. No orthopnea. No wheezing. + cough. No stridor. + sputum production. No chest pain with respiration.  GI: No nausea. No vomiting. No diarrhea.  MSK: No joint pain or pain in any extremities  Integumentary: No skin lesions. No pedal edema.  Neurological: No gross motor weakness. No sensory changes.  [+] All other systems negative  [ ] Unable to assess ROS because ________    OBJECTIVE:  ICU Vital Signs Last 24 Hrs  T(C): 36.7 (01 Jun 2018 04:25), Max: 36.7 (01 Jun 2018 04:25)  T(F): 98.1 (01 Jun 2018 04:25), Max: 98.1 (01 Jun 2018 04:25)  HR: 89 (01 Jun 2018 04:25) (60 - 89)  BP: 105/59 (01 Jun 2018 04:25) (93/42 - 125/63)  BP(mean): --  ABP: --  ABP(mean): --  RR: 19 (01 Jun 2018 04:25) (18 - 22)  SpO2: 95% (01 Jun 2018 04:25) (93% - 100%)        05-31 @ 07:01  -  06-01 @ 04:46  --------------------------------------------------------  IN: 0 mL / OUT: 100 mL / NET: -100 mL      CAPILLARY BLOOD GLUCOSE      POCT Blood Glucose.: 78 mg/dL (31 May 2018 10:06)      PHYSICAL EXAM: NAD in bed  General: Awake, alert, oriented X 3.   HEENT: Atraumatic, normocephalic.                 Mallampatti Grade 2                No nasal congestion.                No tonsillar or pharyngeal exudates.  Lymph Nodes: No palpable lymphadenopathy  Neck: No JVD. No carotid bruit.   Respiratory: Normal chest expansion                         Normal percussion                         Normal and equal air entry                         No wheeze, rhonchi or rales.  Cardiovascular: S1 S2 normal. + murmurs, rubs or gallops.   Abdomen: Soft, non-tender, non-distended. No organomegaly.  Extremities: Warm to touch. Peripheral pulse palpable. + pedal edema.   Skin: No rashes or skin lesions  Neurological: Motor and sensory examination equal and normal in all four extremities.  Psychiatry: Appropriate mood and affect.    HOSPITAL MEDICATIONS:  MEDICATIONS  (STANDING):  amitriptyline 10 milliGRAM(s) Oral two times a day  aspirin enteric coated 81 milliGRAM(s) Oral daily  clopidogrel Tablet 75 milliGRAM(s) Oral daily  cyanocobalamin 500 MICROGram(s) Oral daily  finasteride 5 milliGRAM(s) Oral daily  folic acid 1 milliGRAM(s) Oral daily  levothyroxine 25 MICROGram(s) Oral daily  midodrine 10 milliGRAM(s) Oral three times a day  montelukast 10 milliGRAM(s) Oral daily  phenytoin   Capsule 100 milliGRAM(s) Oral three times a day  polyethylene glycol 3350 17 Gram(s) Oral at bedtime  pyridoxine 100 milliGRAM(s) Oral daily  simvastatin 40 milliGRAM(s) Oral at bedtime  tamsulosin 0.4 milliGRAM(s) Oral at bedtime  tiotropium 18 MICROgram(s) Capsule 1 Capsule(s) Inhalation daily    MEDICATIONS  (PRN):  ALBUTerol    90 MICROgram(s) HFA Inhaler 1 Puff(s) Inhalation daily PRN Bronchospasm      LABS:                        8.8    4.97  )-----------( 164      ( 31 May 2018 20:37 )             26.6     05-31    141  |  104  |  16  ----------------------------<  86  4.3   |  28  |  0.65    Ca    9.1      31 May 2018 10:33  Phos  3.2     05-31  Mg     1.7     05-31    TPro  6.6  /  Alb  3.0<L>  /  TBili  0.3  /  DBili  x   /  AST  52<H>  /  ALT  37  /  AlkPhos  224<H>  05-31    PT/INR - ( 31 May 2018 10:33 )   PT: 11.8 sec;   INR: 1.09 ratio         PTT - ( 31 May 2018 10:33 )  PTT:30.6 sec      Venous Blood Gas:  05-31 @ 10:33  7.36/54/21/30/27  VBG Lactate: 1.1      MICROBIOLOGY:     RADIOLOGY:  < from: CT Angio Abdomen and Pelvis w/ IV Cont (05.31.18 @ 12:34) >  INTERPRETATION:  Clinical confirmation: Hemoptysis. Evaluate for   pulmonary embolus. CT scan of the chest is compared to previous study of   4/26/2018.    CT angiogram of the chest, abdomen and pelvis was obtained following   administration of intravenous contrast. Approximately 125-cc of Omnipaque   350 was administered and 25 cc was discarded. Coronal, sagittal and MIP   images were submitted for review.    No hilar and/or mediastinal adenopathy is noted.     Heart is enlarged in size. Calcification/stent noted within the coronary   artery. Aortic valvular calcification is also noted.No pericardial   effusion is noted. Pulmonary arteries are normal in caliber. No filling   defects are noted.    No endobronchial lesions are noted. Mucus is present within the right   mainstem bronchus as well as within the bronchus intermedius. Linear   opacities representing atelectasis are noted in the right lower lobe.   Small bilateral pleural effusions are noted.    Liver, spleen, pancreas and both adrenal glands are unremarkable.     Both kidneys enhance with contrast. Low-attenuation lesions within both   kidneys are noted. The larger one's measure fluid attenuation and thus   represents cysts while the smaller one's are too small to be adequately   characterized on this exam.     No retroperitoneal adenopathy is noted.     Patient is status post esophagectomy with gastric pull-up. Extensive   stool is noted throughout the large bowel.     Examination of the pelvis demonstrate right inguinal hernia containing   fluid.    Patient is status post left hip replacement. Loss of heightof multiple   vertebral bodies are noted.    Impression: No pulmonary embolus is noted.        < end of copied text >    [ ] Reviewed and interpreted by me    Point of Care Ultrasound Findings:    PFT:    EKG:

## 2018-06-01 NOTE — CHART NOTE - NSCHARTNOTEFT_GEN_A_CORE
Upon Nutritional Assessment by the Registered Dietitian your patient was determined to meet criteria / has evidence of the following diagnosis/diagnoses:          [ ]  Mild Protein Calorie Malnutrition        [ X]  Moderate Protein Calorie Malnutrition        [ ] Severe Protein Calorie Malnutrition        [ ] Unspecified Protein Calorie Malnutrition        [ ] Underweight / BMI <19        [ ] Morbid Obesity / BMI > 40      Findings as based on:  [ X] Comprehensive nutrition assessment: 25 % Wt loss x9 months  [ X] Nutrition Focused Physical Exam: visual fat/muscle wasting   [ X] Other: catabloic illness      Nutrition Plan/Recommendations:      Add Ensure enlive x1     PROVIDER Section:     By signing this assessment you are acknowledging and agree with the diagnosis/diagnoses assigned by the Registered Dietitian    Comments:

## 2018-06-01 NOTE — CONSULT NOTE ADULT - ASSESSMENT
The patient is a 82y Male with a history of HLD, HT, Ca, strk, sz,  recent admission for dilantin toxicity after weight loss- readmittted for bleeding ? nasal-  here to f/u on dilantin dosing   rec daily lvel- adjust dose accordingly and continue 100 tid for now

## 2018-06-02 LAB
ANION GAP SERPL CALC-SCNC: 9 MMOL/L — SIGNIFICANT CHANGE UP (ref 5–17)
BLD GP AB SCN SERPL QL: NEGATIVE — SIGNIFICANT CHANGE UP
BUN SERPL-MCNC: 20 MG/DL — SIGNIFICANT CHANGE UP (ref 7–23)
CALCIUM SERPL-MCNC: 9 MG/DL — SIGNIFICANT CHANGE UP (ref 8.4–10.5)
CHLORIDE SERPL-SCNC: 103 MMOL/L — SIGNIFICANT CHANGE UP (ref 96–108)
CO2 SERPL-SCNC: 27 MMOL/L — SIGNIFICANT CHANGE UP (ref 22–31)
CREAT SERPL-MCNC: 0.67 MG/DL — SIGNIFICANT CHANGE UP (ref 0.5–1.3)
GLUCOSE SERPL-MCNC: 101 MG/DL — HIGH (ref 70–99)
HCT VFR BLD CALC: 21.9 % — LOW (ref 39–50)
HCT VFR BLD CALC: 22 % — LOW (ref 39–50)
HGB BLD-MCNC: 7.3 G/DL — LOW (ref 13–17)
HGB BLD-MCNC: 7.5 G/DL — LOW (ref 13–17)
MCHC RBC-ENTMCNC: 31.3 PG — SIGNIFICANT CHANGE UP (ref 27–34)
MCHC RBC-ENTMCNC: 33.2 GM/DL — SIGNIFICANT CHANGE UP (ref 32–36)
MCHC RBC-ENTMCNC: 33.4 PG — SIGNIFICANT CHANGE UP (ref 27–34)
MCHC RBC-ENTMCNC: 34.3 GM/DL — SIGNIFICANT CHANGE UP (ref 32–36)
MCV RBC AUTO: 94.4 FL — SIGNIFICANT CHANGE UP (ref 80–100)
MCV RBC AUTO: 97.3 FL — SIGNIFICANT CHANGE UP (ref 80–100)
PHENYTOIN FREE SERPL-MCNC: 7.9 UG/ML — LOW (ref 10–20)
PLATELET # BLD AUTO: 175 K/UL — SIGNIFICANT CHANGE UP (ref 150–400)
PLATELET # BLD AUTO: 189 K/UL — SIGNIFICANT CHANGE UP (ref 150–400)
POTASSIUM SERPL-MCNC: 4.1 MMOL/L — SIGNIFICANT CHANGE UP (ref 3.5–5.3)
POTASSIUM SERPL-SCNC: 4.1 MMOL/L — SIGNIFICANT CHANGE UP (ref 3.5–5.3)
RBC # BLD: 2.25 M/UL — LOW (ref 4.2–5.8)
RBC # BLD: 2.33 M/UL — LOW (ref 4.2–5.8)
RBC # FLD: 15.4 % — HIGH (ref 10.3–14.5)
RBC # FLD: 17.5 % — HIGH (ref 10.3–14.5)
RH IG SCN BLD-IMP: POSITIVE — SIGNIFICANT CHANGE UP
SODIUM SERPL-SCNC: 139 MMOL/L — SIGNIFICANT CHANGE UP (ref 135–145)
WBC # BLD: 5.58 K/UL — SIGNIFICANT CHANGE UP (ref 3.8–10.5)
WBC # BLD: 7.1 K/UL — SIGNIFICANT CHANGE UP (ref 3.8–10.5)
WBC # FLD AUTO: 5.58 K/UL — SIGNIFICANT CHANGE UP (ref 3.8–10.5)
WBC # FLD AUTO: 7.1 K/UL — SIGNIFICANT CHANGE UP (ref 3.8–10.5)

## 2018-06-02 PROCEDURE — 99232 SBSQ HOSP IP/OBS MODERATE 35: CPT | Mod: GC

## 2018-06-02 RX ORDER — BENZOCAINE AND MENTHOL 5; 1 G/100ML; G/100ML
1 LIQUID ORAL ONCE
Qty: 0 | Refills: 0 | Status: COMPLETED | OUTPATIENT
Start: 2018-06-02 | End: 2018-06-02

## 2018-06-02 RX ORDER — FUROSEMIDE 40 MG
20 TABLET ORAL ONCE
Qty: 0 | Refills: 0 | Status: COMPLETED | OUTPATIENT
Start: 2018-06-02 | End: 2018-06-02

## 2018-06-02 RX ADMIN — AZITHROMYCIN 250 MILLIGRAM(S): 500 TABLET, FILM COATED ORAL at 13:04

## 2018-06-02 RX ADMIN — BENZOCAINE AND MENTHOL 1 LOZENGE: 5; 1 LIQUID ORAL at 00:19

## 2018-06-02 RX ADMIN — CLOPIDOGREL BISULFATE 75 MILLIGRAM(S): 75 TABLET, FILM COATED ORAL at 13:02

## 2018-06-02 RX ADMIN — Medication 100 MILLIGRAM(S): at 13:01

## 2018-06-02 RX ADMIN — BUDESONIDE AND FORMOTEROL FUMARATE DIHYDRATE 2 PUFF(S): 160; 4.5 AEROSOL RESPIRATORY (INHALATION) at 09:58

## 2018-06-02 RX ADMIN — Medication 10 MILLIGRAM(S): at 17:23

## 2018-06-02 RX ADMIN — Medication 300 MILLIGRAM(S): at 14:55

## 2018-06-02 RX ADMIN — TIOTROPIUM BROMIDE 1 CAPSULE(S): 18 CAPSULE ORAL; RESPIRATORY (INHALATION) at 13:04

## 2018-06-02 RX ADMIN — BUDESONIDE AND FORMOTEROL FUMARATE DIHYDRATE 2 PUFF(S): 160; 4.5 AEROSOL RESPIRATORY (INHALATION) at 21:11

## 2018-06-02 RX ADMIN — Medication 20 MILLIGRAM(S): at 17:31

## 2018-06-02 RX ADMIN — FINASTERIDE 5 MILLIGRAM(S): 5 TABLET, FILM COATED ORAL at 13:04

## 2018-06-02 RX ADMIN — Medication 10 MILLIGRAM(S): at 07:15

## 2018-06-02 RX ADMIN — Medication 5 MILLIGRAM(S): at 07:14

## 2018-06-02 RX ADMIN — MONTELUKAST 10 MILLIGRAM(S): 4 TABLET, CHEWABLE ORAL at 13:02

## 2018-06-02 RX ADMIN — Medication 1 SPRAY(S): at 07:15

## 2018-06-02 RX ADMIN — MIDODRINE HYDROCHLORIDE 10 MILLIGRAM(S): 2.5 TABLET ORAL at 13:01

## 2018-06-02 RX ADMIN — Medication 100 MILLIGRAM(S): at 13:02

## 2018-06-02 RX ADMIN — Medication 81 MILLIGRAM(S): at 13:02

## 2018-06-02 RX ADMIN — Medication 1 MILLIGRAM(S): at 13:02

## 2018-06-02 RX ADMIN — TAMSULOSIN HYDROCHLORIDE 0.4 MILLIGRAM(S): 0.4 CAPSULE ORAL at 21:11

## 2018-06-02 RX ADMIN — Medication 200 MILLIGRAM(S): at 17:23

## 2018-06-02 RX ADMIN — MIDODRINE HYDROCHLORIDE 10 MILLIGRAM(S): 2.5 TABLET ORAL at 21:11

## 2018-06-02 RX ADMIN — MIDODRINE HYDROCHLORIDE 10 MILLIGRAM(S): 2.5 TABLET ORAL at 07:15

## 2018-06-02 RX ADMIN — SIMVASTATIN 40 MILLIGRAM(S): 20 TABLET, FILM COATED ORAL at 21:11

## 2018-06-02 RX ADMIN — PREGABALIN 500 MICROGRAM(S): 225 CAPSULE ORAL at 13:02

## 2018-06-02 RX ADMIN — Medication 100 MILLIGRAM(S): at 07:15

## 2018-06-02 RX ADMIN — Medication 25 MICROGRAM(S): at 07:14

## 2018-06-02 NOTE — PROGRESS NOTE ADULT - ASSESSMENT
To summarize, 82M with h/o HTN, Seizure d/o, Rheumatoid arthritis, Asthma, CAD s/p PCI x 5 in the past with last two stents in 4/2018 on ASA/Plavix, Esophageal CA s/p esophagectomy in 8/14/2017 presents with multiple episodes of hemoptysis    Assesment: Hb has dipped to 7.3 today. His histoory is indeed suggestive of hemoptysis however it seems frankly unusual that someone can lose 3 grams of hemoglobin in his lungs and yet not even be hypoxemic.     Recs: Would recheck HB, if a true low would agree with the ENT recommendation, specially as they saw small amount of blood noted in post cricoid region around esophageal inlet to consider a GI consult    Dw Dr. Lisker

## 2018-06-02 NOTE — PROGRESS NOTE ADULT - ASSESSMENT
81 y/o male with h/o CAD s/p 6 stents (most recent 4/2018), Esophageal CA s/p chemo/RT and esophagogastrectomy (8/14/2017) c/o  hemoptysis since this morning. Reports mucus mixed with blood/clots. Pt noted on ASA and plavix for stents. Laryngoscopy revealed small amount of blood in post cricoid region at esophageal inlet. Right epistaxis began during exam, controlled with merocel nasal packing. Pt with history of recurrent epistaxis. Difficult to determine definitively whether source of hemoptysis is from esophagus or epistaxis, possibly both.

## 2018-06-02 NOTE — PROGRESS NOTE ADULT - ASSESSMENT
pt with mult medical hx with ashd s/p recent stent LADand D1 ,orthostatic hypotension who presented with hemoptyses blood tinged cough, adn in er with possible mobitz 1.  pt asymptomatic with no hx of dizziness or syncope.  tsh  dilantin level  continue asa and Plavix  continue midodrine  ent  saw  pt,  nasal packing/epistaxis  tele,  mobitz  type 1, no intervention per latonya,  none  since  awaiting hb today  on prednisone  per  endo

## 2018-06-02 NOTE — PROGRESS NOTE ADULT - SUBJECTIVE AND OBJECTIVE BOX
no  epistaxis    REVIEW OF SYSTEMS:  CONSTITUTIONAL: No weakness,  no   fevers      RESPIRATORY:  No    shortness of breath  , no  wheezing  CARDIOVASCULAR: No chest pain,   no  palpitations, no  cough  GASTROINTESTINAL: No abdominal  pain, no  vomiting, no  diarrhea  NEUROLOGICAL: No  focal  weakness    MEDICATIONS  (STANDING):  amitriptyline 10 milliGRAM(s) Oral two times a day  aspirin enteric coated 81 milliGRAM(s) Oral daily  azithromycin   Tablet 250 milliGRAM(s) Oral daily  buDESOnide 160 MICROgram(s)/formoterol 4.5 MICROgram(s) Inhaler 2 Puff(s) Inhalation two times a day  clopidogrel Tablet 75 milliGRAM(s) Oral daily  cyanocobalamin 500 MICROGram(s) Oral daily  finasteride 5 milliGRAM(s) Oral daily  fluticasone propionate 50 MICROgram(s)/spray Nasal Spray 1 Spray(s) Both Nostrils two times a day  folic acid 1 milliGRAM(s) Oral daily  levothyroxine 25 MICROGram(s) Oral <User Schedule>  levothyroxine 50 MICROGram(s) Oral <User Schedule>  midodrine 10 milliGRAM(s) Oral three times a day  montelukast 10 milliGRAM(s) Oral daily  phenytoin   Capsule 100 milliGRAM(s) Oral three times a day  polyethylene glycol 3350 17 Gram(s) Oral at bedtime  predniSONE   Tablet 5 milliGRAM(s) Oral daily  pyridoxine 100 milliGRAM(s) Oral daily  simvastatin 40 milliGRAM(s) Oral at bedtime  tamsulosin 0.4 milliGRAM(s) Oral at bedtime  tiotropium 18 MICROgram(s) Capsule 1 Capsule(s) Inhalation daily    MEDICATIONS  (PRN):  ALBUTerol    90 MICROgram(s) HFA Inhaler 1 Puff(s) Inhalation daily PRN Bronchospasm  bisacodyl 5 milliGRAM(s) Oral daily PRN Constipation      Vital Signs Last 24 Hrs  T(C): 36.7 (02 Jun 2018 04:01), Max: 36.7 (02 Jun 2018 04:01)  T(F): 98 (02 Jun 2018 04:01), Max: 98 (02 Jun 2018 04:01)  HR: 82 (02 Jun 2018 04:01) (51 - 82)  BP: 100/64 (02 Jun 2018 04:01) (88/47 - 123/73)  BP(mean): --  RR: 18 (02 Jun 2018 04:01) (18 - 19)  SpO2: 94% (02 Jun 2018 04:01) (94% - 97%)  CAPILLARY BLOOD GLUCOSE        I&O's Summary    01 Jun 2018 07:01  -  02 Jun 2018 07:00  --------------------------------------------------------  IN: 592 mL / OUT: 900 mL / NET: -308 mL    02 Jun 2018 07:01  -  02 Jun 2018 09:02  --------------------------------------------------------  IN: 0 mL / OUT: 250 mL / NET: -250 mL        PHYSICAL EXAM:  HEAD:  Atraumatic, Normocephalic  NECK: Supple, No   JVD  CHEST/LUNG:   no     rales,     no,    rhonchi  HEART: Regular rate and rhythm;         murmur  ABDOMEN: Soft, Nontender, ;   EXTREMITIES:        edema  NEUROLOGY:  alert    LABS:                        9.0    5.5   )-----------( 193      ( 01 Jun 2018 10:20 )             26.7     06-02    139  |  103  |  20  ----------------------------<  101<H>  4.1   |  27  |  0.67    Ca    9.0      02 Jun 2018 06:16  Phos  3.2     05-31  Mg     1.7     05-31    TPro  6.4  /  Alb  2.8<L>  /  TBili  0.2  /  DBili  x   /  AST  32  /  ALT  31  /  AlkPhos  214<H>  06-01    PT/INR - ( 31 May 2018 10:33 )   PT: 11.8 sec;   INR: 1.09 ratio         PTT - ( 31 May 2018 10:33 )  PTT:30.6 sec  CARDIAC MARKERS ( 31 May 2018 10:33 )  x     / 0.08 ng/mL / 106 U/L / x     / 11.3 ng/mL            05-31 @ 10:33  3.8  21    Hemoglobin A1C, Whole Blood: 4.7 % (05-27 @ 12:16)    Thyroid Stimulating Hormone, Serum: 3.62 uIU/mL (06-01 @ 10:50)          Consultant(s) Notes Reviewed:      Care Discussed with Consultants/Other Providers:

## 2018-06-02 NOTE — PROGRESS NOTE ADULT - SUBJECTIVE AND OBJECTIVE BOX
CHIEF COMPLAINT:    Interval Events: still having some hemoptysis    REVIEW OF SYSTEMS:  unchanged from prior note    OBJECTIVE:  ICU Vital Signs Last 24 Hrs  T(C): 36.7 (02 Jun 2018 04:01), Max: 36.7 (02 Jun 2018 04:01)  T(F): 98 (02 Jun 2018 04:01), Max: 98 (02 Jun 2018 04:01)  HR: 82 (02 Jun 2018 04:01) (51 - 82)  BP: 100/64 (02 Jun 2018 04:01) (88/47 - 123/73)  BP(mean): --  ABP: --  ABP(mean): --  RR: 18 (02 Jun 2018 04:01) (18 - 19)  SpO2: 94% (02 Jun 2018 04:01) (94% - 97%)        06-01 @ 07:01  -  06-02 @ 07:00  --------------------------------------------------------  IN: 592 mL / OUT: 900 mL / NET: -308 mL    06-02 @ 07:01  -  06-02 @ 10:24  --------------------------------------------------------  IN: 0 mL / OUT: 250 mL / NET: -250 mL      CAPILLARY BLOOD GLUCOSE      POCT Blood Glucose.: 132 mg/dL (01 Jun 2018 08:45)      PHYSICAL EXAM:  General: AOX3  HEENT: no pallor, icterus, cyanosis  Lymph Nodes: no lymphadenopathy  Neck: no JVD  Respiratory: VBS, no crackles, wheezes  Cardiovascular: S1s2, no murmur  Abdomen: soft, non tender  Extremities: no oedema  Skin: warm  Neurological:no FND  Psychiatry: no SI/HI    HOSPITAL MEDICATIONS:  MEDICATIONS  (STANDING):  amitriptyline 10 milliGRAM(s) Oral two times a day  aspirin enteric coated 81 milliGRAM(s) Oral daily  azithromycin   Tablet 250 milliGRAM(s) Oral daily  buDESOnide 160 MICROgram(s)/formoterol 4.5 MICROgram(s) Inhaler 2 Puff(s) Inhalation two times a day  clopidogrel Tablet 75 milliGRAM(s) Oral daily  cyanocobalamin 500 MICROGram(s) Oral daily  finasteride 5 milliGRAM(s) Oral daily  fluticasone propionate 50 MICROgram(s)/spray Nasal Spray 1 Spray(s) Both Nostrils two times a day  folic acid 1 milliGRAM(s) Oral daily  levothyroxine 25 MICROGram(s) Oral <User Schedule>  levothyroxine 50 MICROGram(s) Oral <User Schedule>  midodrine 10 milliGRAM(s) Oral three times a day  montelukast 10 milliGRAM(s) Oral daily  phenytoin   Capsule 100 milliGRAM(s) Oral three times a day  polyethylene glycol 3350 17 Gram(s) Oral at bedtime  predniSONE   Tablet 5 milliGRAM(s) Oral daily  pyridoxine 100 milliGRAM(s) Oral daily  simvastatin 40 milliGRAM(s) Oral at bedtime  tamsulosin 0.4 milliGRAM(s) Oral at bedtime  tiotropium 18 MICROgram(s) Capsule 1 Capsule(s) Inhalation daily    MEDICATIONS  (PRN):  ALBUTerol    90 MICROgram(s) HFA Inhaler 1 Puff(s) Inhalation daily PRN Bronchospasm  bisacodyl 5 milliGRAM(s) Oral daily PRN Constipation      LABS:                        7.3    5.58  )-----------( 175      ( 02 Jun 2018 07:56 )             22.0     06-02    139  |  103  |  20  ----------------------------<  101<H>  4.1   |  27  |  0.67    Ca    9.0      02 Jun 2018 06:16  Phos  3.2     05-31  Mg     1.7     05-31    TPro  6.4  /  Alb  2.8<L>  /  TBili  0.2  /  DBili  x   /  AST  32  /  ALT  31  /  AlkPhos  214<H>  06-01    PT/INR - ( 31 May 2018 10:33 )   PT: 11.8 sec;   INR: 1.09 ratio         PTT - ( 31 May 2018 10:33 )  PTT:30.6 sec      Venous Blood Gas:  05-31 @ 10:33  7.36/54/21/30/27  VBG Lactate: 1.1      MICROBIOLOGY:     RADIOLOGY:  [ ] Reviewed and interpreted by me    PULMONARY FUNCTION TESTS:    EKG:

## 2018-06-02 NOTE — PROGRESS NOTE ADULT - ATTENDING COMMENTS
Pt afebrile  feels well.  no leukocytosis  CXR clear  chronic low blood pressure, likely somewhat lower on admission due to decreased fluid intake  midodrine d/c'd.  blood pressure remains stable  ok to d/c from pulm standpoint  today is day 5 of zithromax, started as outpt Imaging reviewed, ENT note reviewed, history reviewed  does not appear to be true hemoptysis at this point,  know ENT bleed  but also noted blood around esoph inlet, and h/o esoph ca -- agree with GI evaluation

## 2018-06-02 NOTE — PROGRESS NOTE ADULT - SUBJECTIVE AND OBJECTIVE BOX
- Patient seen and examined.  - In summary, patient is a 82 year old man who presented with hemoptysis (31 May 2018 14:01)  - Today, patient is without complaints.         *****MEDICATIONS:    MEDICATIONS  (STANDING):  amitriptyline 10 milliGRAM(s) Oral two times a day  aspirin enteric coated 81 milliGRAM(s) Oral daily  azithromycin   Tablet 250 milliGRAM(s) Oral daily  buDESOnide 160 MICROgram(s)/formoterol 4.5 MICROgram(s) Inhaler 2 Puff(s) Inhalation two times a day  clopidogrel Tablet 75 milliGRAM(s) Oral daily  cyanocobalamin 500 MICROGram(s) Oral daily  finasteride 5 milliGRAM(s) Oral daily  fluticasone propionate 50 MICROgram(s)/spray Nasal Spray 1 Spray(s) Both Nostrils two times a day  folic acid 1 milliGRAM(s) Oral daily  levothyroxine 25 MICROGram(s) Oral <User Schedule>  levothyroxine 50 MICROGram(s) Oral <User Schedule>  midodrine 10 milliGRAM(s) Oral three times a day  montelukast 10 milliGRAM(s) Oral daily  phenytoin   Capsule 100 milliGRAM(s) Oral three times a day  polyethylene glycol 3350 17 Gram(s) Oral at bedtime  predniSONE   Tablet 5 milliGRAM(s) Oral daily  pyridoxine 100 milliGRAM(s) Oral daily  simvastatin 40 milliGRAM(s) Oral at bedtime  tamsulosin 0.4 milliGRAM(s) Oral at bedtime  tiotropium 18 MICROgram(s) Capsule 1 Capsule(s) Inhalation daily    MEDICATIONS  (PRN):  ALBUTerol    90 MICROgram(s) HFA Inhaler 1 Puff(s) Inhalation daily PRN Bronchospasm  bisacodyl 5 milliGRAM(s) Oral daily PRN Constipation             ***** REVIEW OF SYSTEM:  GEN: no fever, no chills, no pain  RESP: no SOB, no cough, no sputum  CVS: no chest pain, no palpitations, no edema  GI: no abdominal pain, no nausea, no vomiting, no constipation, no diarrhea  : no dysuria, no frequency  NEURO: no headache, no dizziness  PSYCH: no depression, not anxious  Derm : no itching, no rash         ***** VITAL SIGNS:    T(F): 98 (06-02-18 @ 04:01), Max: 98 (18 @ 04:01)  HR: 82 (18 @ 04:01) (51 - 82)  BP: 100/64 (18 @ 04:01) (88/47 - 123/73)  RR: 18 (18 @ 04:01) (18 - 19)  SpO2: 94% (18 @ 04:01) (94% - 97%)  Wt(kg): --  ,   I&O's Summary    2018 07:  -  2018 07:00  --------------------------------------------------------  IN: 592 mL / OUT: 900 mL / NET: -308 mL    2018 07:01  -  2018 08:53  --------------------------------------------------------  IN: 0 mL / OUT: 250 mL / NET: -250 mL                 *****PHYSICAL EXAM:  GEN: A&O X 3 , NAD , comfortable  HEENT: NCAT, EOMI, MMM, no icterus  NECK: Supple, No JVD  CVS: S1S2 , regular , No M/R/G appreciated  PULM: CTA B/L,  no W/R/R appreciated  ABD.: soft. non tender, non distended,  bowel sounds present  Extrem: intact pulses , no edema noted  Derm: No rash or ecchymosis noted  PSYCH: normal mood, no depression, not anxious         *****LAB AND IMAGIN.0    5.5   )-----------( 193      ( 2018 10:20 )             26.7               06-02    139  |  103  |  20  ----------------------------<  101<H>  4.1   |  27  |  0.67    Ca    9.0      2018 06:16  Phos  3.2     05-31  Mg     1.7     -    TPro  6.4  /  Alb  2.8<L>  /  TBili  0.2  /  DBili  x   /  AST  32  /  ALT  31  /  AlkPhos  214<H>      PT/INR - ( 31 May 2018 10:33 )   PT: 11.8 sec;   INR: 1.09 ratio         PTT - ( 31 May 2018 10:33 )  PTT:30.6 sec       CARDIAC MARKERS ( 31 May 2018 10:33 )  x     / 0.08 ng/mL / 106 U/L / x     / 11.3 ng/mL            [All pertinent recent Imaging/Reports reviewed]         *****A S S E S S M E N T   A N D   P L A N :  82M  with CAD s/p recent stents orthostatic hypotension who presented with hemoptyses  tele mostly NSR, brief Mobitz 1 HB overnight  pt asymptomatic with no hx of dizziness or syncope  not on AVN affecting meds  f/u dilantin level  continue asa and Plavix s/p pci  continue midodrine  f/u ent/pulmonary      __________________________  A. TRE Brady.

## 2018-06-02 NOTE — PROGRESS NOTE ADULT - SUBJECTIVE AND OBJECTIVE BOX
POD/STATUS POST/ENT ISSUE: Epistaxis	    INTERVAL HPI: pt seen & examined, c/o of mild intermittent trickling sensation down throat some bloody secretions, pt receiving PRBCsx 1 unit H/H 7.5/21.9 pt denies any epistaxis or justice hemoptysis    Vital Signs Last 24 Hrs  T(C): 36.5 (02 Jun 2018 11:21), Max: 36.7 (02 Jun 2018 04:01)  T(F): 97.7 (02 Jun 2018 11:21), Max: 98 (02 Jun 2018 04:01)  HR: 80 (02 Jun 2018 11:21) (77 - 82)  BP: 124/52 (02 Jun 2018 11:21) (100/64 - 124/52)  RR: 18 (02 Jun 2018 11:21) (18 - 18)  SpO2: 97% (02 Jun 2018 11:21) (94% - 97%)    PHYSICAL EXAM:  Gen: NAD, well-developed  Head: Normocephalic, Atraumatic  Eyes: no scleral injection  Nose: R Nare with Merocel packing in place, with no bleeding, Left clean and dry  Mouth: Mucosa moist, tongue/uvula midline, oropharynx clear no blood in posterior OP  Neck: Flat, supple, no lymphadenopathy, trachea midline, no masses  Resp: breathing easily, no stridor      LABS:                        7.5    7.1   )-----------( 189      ( 02 Jun 2018 11:04 )             21.9

## 2018-06-03 ENCOUNTER — TRANSCRIPTION ENCOUNTER (OUTPATIENT)
Age: 83
End: 2018-06-03

## 2018-06-03 VITALS
OXYGEN SATURATION: 95 % | RESPIRATION RATE: 18 BRPM | DIASTOLIC BLOOD PRESSURE: 54 MMHG | TEMPERATURE: 98 F | SYSTOLIC BLOOD PRESSURE: 99 MMHG | HEART RATE: 78 BPM

## 2018-06-03 LAB
ANION GAP SERPL CALC-SCNC: 8 MMOL/L — SIGNIFICANT CHANGE UP (ref 5–17)
BUN SERPL-MCNC: 23 MG/DL — SIGNIFICANT CHANGE UP (ref 7–23)
CALCIUM SERPL-MCNC: 9.2 MG/DL — SIGNIFICANT CHANGE UP (ref 8.4–10.5)
CHLORIDE SERPL-SCNC: 103 MMOL/L — SIGNIFICANT CHANGE UP (ref 96–108)
CO2 SERPL-SCNC: 28 MMOL/L — SIGNIFICANT CHANGE UP (ref 22–31)
CREAT SERPL-MCNC: 0.64 MG/DL — SIGNIFICANT CHANGE UP (ref 0.5–1.3)
GLUCOSE SERPL-MCNC: 76 MG/DL — SIGNIFICANT CHANGE UP (ref 70–99)
HCT VFR BLD CALC: 29.6 % — LOW (ref 39–50)
HGB BLD-MCNC: 9.9 G/DL — LOW (ref 13–17)
MCHC RBC-ENTMCNC: 29.9 PG — SIGNIFICANT CHANGE UP (ref 27–34)
MCHC RBC-ENTMCNC: 33.4 GM/DL — SIGNIFICANT CHANGE UP (ref 32–36)
MCV RBC AUTO: 89.4 FL — SIGNIFICANT CHANGE UP (ref 80–100)
PHENYTOIN FREE SERPL-MCNC: 11 UG/ML — SIGNIFICANT CHANGE UP (ref 10–20)
PLATELET # BLD AUTO: 159 K/UL — SIGNIFICANT CHANGE UP (ref 150–400)
POTASSIUM SERPL-MCNC: 3.8 MMOL/L — SIGNIFICANT CHANGE UP (ref 3.5–5.3)
POTASSIUM SERPL-SCNC: 3.8 MMOL/L — SIGNIFICANT CHANGE UP (ref 3.5–5.3)
RBC # BLD: 3.31 M/UL — LOW (ref 4.2–5.8)
RBC # FLD: 18.8 % — HIGH (ref 10.3–14.5)
SODIUM SERPL-SCNC: 139 MMOL/L — SIGNIFICANT CHANGE UP (ref 135–145)
WBC # BLD: 5.82 K/UL — SIGNIFICANT CHANGE UP (ref 3.8–10.5)
WBC # FLD AUTO: 5.82 K/UL — SIGNIFICANT CHANGE UP (ref 3.8–10.5)

## 2018-06-03 PROCEDURE — 74174 CTA ABD&PLVS W/CONTRAST: CPT

## 2018-06-03 PROCEDURE — 86850 RBC ANTIBODY SCREEN: CPT

## 2018-06-03 PROCEDURE — 82962 GLUCOSE BLOOD TEST: CPT

## 2018-06-03 PROCEDURE — 71275 CT ANGIOGRAPHY CHEST: CPT

## 2018-06-03 PROCEDURE — 86900 BLOOD TYPING SEROLOGIC ABO: CPT

## 2018-06-03 PROCEDURE — 71045 X-RAY EXAM CHEST 1 VIEW: CPT

## 2018-06-03 PROCEDURE — 82435 ASSAY OF BLOOD CHLORIDE: CPT

## 2018-06-03 PROCEDURE — 85610 PROTHROMBIN TIME: CPT

## 2018-06-03 PROCEDURE — 82550 ASSAY OF CK (CPK): CPT

## 2018-06-03 PROCEDURE — 84132 ASSAY OF SERUM POTASSIUM: CPT

## 2018-06-03 PROCEDURE — 84100 ASSAY OF PHOSPHORUS: CPT

## 2018-06-03 PROCEDURE — 85014 HEMATOCRIT: CPT

## 2018-06-03 PROCEDURE — 82272 OCCULT BLD FECES 1-3 TESTS: CPT

## 2018-06-03 PROCEDURE — 36430 TRANSFUSION BLD/BLD COMPNT: CPT

## 2018-06-03 PROCEDURE — 80048 BASIC METABOLIC PNL TOTAL CA: CPT

## 2018-06-03 PROCEDURE — 80053 COMPREHEN METABOLIC PANEL: CPT

## 2018-06-03 PROCEDURE — 80185 ASSAY OF PHENYTOIN TOTAL: CPT

## 2018-06-03 PROCEDURE — P9016: CPT

## 2018-06-03 PROCEDURE — 82947 ASSAY GLUCOSE BLOOD QUANT: CPT

## 2018-06-03 PROCEDURE — 93005 ELECTROCARDIOGRAM TRACING: CPT

## 2018-06-03 PROCEDURE — 86923 COMPATIBILITY TEST ELECTRIC: CPT

## 2018-06-03 PROCEDURE — 97162 PT EVAL MOD COMPLEX 30 MIN: CPT

## 2018-06-03 PROCEDURE — 83605 ASSAY OF LACTIC ACID: CPT

## 2018-06-03 PROCEDURE — 86901 BLOOD TYPING SEROLOGIC RH(D): CPT

## 2018-06-03 PROCEDURE — 85027 COMPLETE CBC AUTOMATED: CPT

## 2018-06-03 PROCEDURE — 85730 THROMBOPLASTIN TIME PARTIAL: CPT

## 2018-06-03 PROCEDURE — 82565 ASSAY OF CREATININE: CPT

## 2018-06-03 PROCEDURE — 93971 EXTREMITY STUDY: CPT

## 2018-06-03 PROCEDURE — 82803 BLOOD GASES ANY COMBINATION: CPT

## 2018-06-03 PROCEDURE — 94640 AIRWAY INHALATION TREATMENT: CPT

## 2018-06-03 PROCEDURE — 84295 ASSAY OF SERUM SODIUM: CPT

## 2018-06-03 PROCEDURE — 84484 ASSAY OF TROPONIN QUANT: CPT

## 2018-06-03 PROCEDURE — 82330 ASSAY OF CALCIUM: CPT

## 2018-06-03 PROCEDURE — 99285 EMERGENCY DEPT VISIT HI MDM: CPT | Mod: 25

## 2018-06-03 PROCEDURE — 82553 CREATINE MB FRACTION: CPT

## 2018-06-03 PROCEDURE — 84443 ASSAY THYROID STIM HORMONE: CPT

## 2018-06-03 PROCEDURE — 83735 ASSAY OF MAGNESIUM: CPT

## 2018-06-03 RX ORDER — FLUTICASONE PROPIONATE 50 MCG
1 SPRAY, SUSPENSION NASAL
Qty: 1 | Refills: 0 | OUTPATIENT
Start: 2018-06-03 | End: 2018-06-16

## 2018-06-03 RX ORDER — GUAIFENESIN/DEXTROMETHORPHAN 600MG-30MG
1 TABLET, EXTENDED RELEASE 12 HR ORAL
Qty: 0 | Refills: 0 | COMMUNITY

## 2018-06-03 RX ORDER — SODIUM CHLORIDE 0.65 %
2 AEROSOL, SPRAY (ML) NASAL
Qty: 1 | Refills: 0 | OUTPATIENT
Start: 2018-06-03 | End: 2018-07-02

## 2018-06-03 RX ORDER — PREGABALIN 225 MG/1
1 CAPSULE ORAL
Qty: 0 | Refills: 0 | COMMUNITY
Start: 2018-06-03

## 2018-06-03 RX ORDER — SODIUM CHLORIDE 0.65 %
2 AEROSOL, SPRAY (ML) NASAL
Qty: 0 | Refills: 0 | COMMUNITY

## 2018-06-03 RX ORDER — AZITHROMYCIN 500 MG/1
1 TABLET, FILM COATED ORAL
Qty: 3 | Refills: 0 | OUTPATIENT
Start: 2018-06-03 | End: 2018-06-05

## 2018-06-03 RX ORDER — PREGABALIN 225 MG/1
0 CAPSULE ORAL
Qty: 0 | Refills: 0 | COMMUNITY

## 2018-06-03 RX ADMIN — Medication 10 MILLIGRAM(S): at 06:47

## 2018-06-03 RX ADMIN — TIOTROPIUM BROMIDE 1 CAPSULE(S): 18 CAPSULE ORAL; RESPIRATORY (INHALATION) at 12:20

## 2018-06-03 RX ADMIN — PREGABALIN 500 MICROGRAM(S): 225 CAPSULE ORAL at 12:18

## 2018-06-03 RX ADMIN — CLOPIDOGREL BISULFATE 75 MILLIGRAM(S): 75 TABLET, FILM COATED ORAL at 12:18

## 2018-06-03 RX ADMIN — Medication 200 MILLIGRAM(S): at 06:46

## 2018-06-03 RX ADMIN — Medication 81 MILLIGRAM(S): at 12:22

## 2018-06-03 RX ADMIN — Medication 1 SPRAY(S): at 06:47

## 2018-06-03 RX ADMIN — Medication 1 MILLIGRAM(S): at 12:18

## 2018-06-03 RX ADMIN — AZITHROMYCIN 250 MILLIGRAM(S): 500 TABLET, FILM COATED ORAL at 12:18

## 2018-06-03 RX ADMIN — MONTELUKAST 10 MILLIGRAM(S): 4 TABLET, CHEWABLE ORAL at 12:18

## 2018-06-03 RX ADMIN — Medication 100 MILLIGRAM(S): at 12:18

## 2018-06-03 RX ADMIN — Medication 5 MILLIGRAM(S): at 06:47

## 2018-06-03 RX ADMIN — Medication 25 MICROGRAM(S): at 06:46

## 2018-06-03 RX ADMIN — BUDESONIDE AND FORMOTEROL FUMARATE DIHYDRATE 2 PUFF(S): 160; 4.5 AEROSOL RESPIRATORY (INHALATION) at 12:17

## 2018-06-03 RX ADMIN — MIDODRINE HYDROCHLORIDE 10 MILLIGRAM(S): 2.5 TABLET ORAL at 06:46

## 2018-06-03 RX ADMIN — FINASTERIDE 5 MILLIGRAM(S): 5 TABLET, FILM COATED ORAL at 12:18

## 2018-06-03 RX ADMIN — MIDODRINE HYDROCHLORIDE 10 MILLIGRAM(S): 2.5 TABLET ORAL at 14:55

## 2018-06-03 NOTE — PHYSICAL THERAPY INITIAL EVALUATION ADULT - PERTINENT HX OF CURRENT PROBLEM, REHAB EVAL
82y old  Male who presents with a chief complaint of hemoptysis. 82y old  Male who presents with a chief complaint of hemoptysis. Asper 6/2 low H/H . spoke with Mona WEI, per RN patient is s/p blood transfusion after 6/2 blood draw and cleared for PT.

## 2018-06-03 NOTE — DISCHARGE NOTE ADULT - PATIENT PORTAL LINK FT
You can access the [facility] Patient [facility], offered by [facility], by registering with the following website: [email or web address]

## 2018-06-03 NOTE — DISCHARGE NOTE ADULT - INSTRUCTIONS
as tolerated - soft foods Call and make follow up appointment with Primary Care Provider, Cardiologist and Otolaryngology after discharge. Return to the nearest emergency room or call 911 for worsening nose bleed, dizziness or lightheadedness.

## 2018-06-03 NOTE — DISCHARGE NOTE ADULT - PLAN OF CARE
resolved follow up with the ENT doctor this coming week asymptomatic follow up with your cardiologist risk modification Coronary artery disease is a condition where the arteries the supply the heart muscle get clogges with fatty deposits & puts you at risk for a heart attack  Call your doctor if you have any new pain, pressure, or discomfort in the center of your chest, pain, tingling or discomfort in arms, back, neck, jaw, or stomach, shortness of breath, nausea, vomiting, burping or heartburn, sweating, cold and clammy skin, racing or abnormal heartbeat for more than 10 minutes or if they keep coming & going.  Call 911 and do not tr to get to hospital by care  You can help yourself with lefestyle changes (quitting smoking if you smoke), eat lots of fruits & vegetables & low fat dairy products, not a lot of meat & fatty foods, walk or some form of physical activity most days of the week, lose weight if you are overweight  Take your cardiac medication as prescribed to lower cholesterol, to lower blood pressure, aspirin to prevent blood clots, and diabetes control  Make sure to keep appointments with doctor for cardiac follow up care

## 2018-06-03 NOTE — PROGRESS NOTE ADULT - SUBJECTIVE AND OBJECTIVE BOX
mlple  complaints, about  bed/ urinal/ call bell, etc  no  epistaxis    REVIEW OF SYSTEMS:  CONSTITUTIONAL: No weakness,  no   fevers      RESPIRATORY:  No    shortness of breath  , no  wheezing  CARDIOVASCULAR: No chest pain,   no  palpitations, no  cough  GASTROINTESTINAL: No abdominal  pain, no  vomiting, no  diarrhea  NEUROLOGICAL: No  focal  weakness    MEDICATIONS  (STANDING):  amitriptyline 10 milliGRAM(s) Oral two times a day  aspirin enteric coated 81 milliGRAM(s) Oral daily  azithromycin   Tablet 250 milliGRAM(s) Oral daily  buDESOnide 160 MICROgram(s)/formoterol 4.5 MICROgram(s) Inhaler 2 Puff(s) Inhalation two times a day  clopidogrel Tablet 75 milliGRAM(s) Oral daily  cyanocobalamin 500 MICROGram(s) Oral daily  finasteride 5 milliGRAM(s) Oral daily  fluticasone propionate 50 MICROgram(s)/spray Nasal Spray 1 Spray(s) Both Nostrils two times a day  folic acid 1 milliGRAM(s) Oral daily  levothyroxine 25 MICROGram(s) Oral <User Schedule>  levothyroxine 50 MICROGram(s) Oral <User Schedule>  midodrine 10 milliGRAM(s) Oral three times a day  montelukast 10 milliGRAM(s) Oral daily  phenytoin   Capsule 200 milliGRAM(s) Oral two times a day  polyethylene glycol 3350 17 Gram(s) Oral at bedtime  predniSONE   Tablet 5 milliGRAM(s) Oral daily  pyridoxine 100 milliGRAM(s) Oral daily  simvastatin 40 milliGRAM(s) Oral at bedtime  tamsulosin 0.4 milliGRAM(s) Oral at bedtime  tiotropium 18 MICROgram(s) Capsule 1 Capsule(s) Inhalation daily    MEDICATIONS  (PRN):  ALBUTerol    90 MICROgram(s) HFA Inhaler 1 Puff(s) Inhalation daily PRN Bronchospasm  bisacodyl 5 milliGRAM(s) Oral daily PRN Constipation      Vital Signs Last 24 Hrs  T(C): 36.4 (03 Jun 2018 04:00), Max: 36.7 (02 Jun 2018 20:55)  T(F): 97.5 (03 Jun 2018 04:00), Max: 98.1 (02 Jun 2018 20:55)  HR: 74 (03 Jun 2018 04:00) (74 - 81)  BP: 101/60 (03 Jun 2018 04:00) (101/60 - 124/52)  BP(mean): --  RR: 18 (03 Jun 2018 04:00) (18 - 18)  SpO2: 97% (03 Jun 2018 04:00) (95% - 97%)  CAPILLARY BLOOD GLUCOSE        I&O's Summary    02 Jun 2018 07:01  -  03 Jun 2018 07:00  --------------------------------------------------------  IN: 690 mL / OUT: 1575 mL / NET: -885 mL    03 Jun 2018 07:01  -  03 Jun 2018 09:23  --------------------------------------------------------  IN: 0 mL / OUT: 200 mL / NET: -200 mL        PHYSICAL EXAM:  HEAD:  Atraumatic, Normocephalic  NECK: Supple, No   JVD  CHEST/LUNG:   no     rales,     no,    rhonchi  HEART: Regular rate and rhythm;         murmur  ABDOMEN: Soft, Nontender, ;   EXTREMITIES:        edema  NEUROLOGY:  alert    LABS:                        7.5    7.1   )-----------( 189      ( 02 Jun 2018 11:04 )             21.9     06-03    139  |  103  |  23  ----------------------------<  76  3.8   |  28  |  0.64    Ca    9.2      03 Jun 2018 06:29                    Hemoglobin A1C, Whole Blood: 4.7 % (05-27 @ 12:16)    Thyroid Stimulating Hormone, Serum: 3.62 uIU/mL (06-01 @ 10:50)          Consultant(s) Notes Reviewed:      Care Discussed with Consultants/Other Providers:

## 2018-06-03 NOTE — PHYSICAL THERAPY INITIAL EVALUATION ADULT - PATIENT/FAMILY AGREES WITH PLAN
no/Sub acute rehab, but pt's wife/ daughter wants d/c home. iIf pt. d/c home, would recommend home with PT and assist for all functional mobility.

## 2018-06-03 NOTE — PROGRESS NOTE ADULT - ASSESSMENT
pt with mult medical hx with ashd s/p recent stent LADand D1 ,orthostatic hypotension who presented with hemoptyses blood tinged cough, adn in er with possible mobitz 1.  continue asa and Plavix/cad  continue midodrine  ent  saw  pt,  nasal packing/epistaxis  tele,  mobitz  type 1, no intervention per  card  none  since  on prednisone  per  endo  s/p 2 units prbc, follow  hb  today

## 2018-06-03 NOTE — DISCHARGE NOTE ADULT - MEDICATION SUMMARY - MEDICATIONS TO STOP TAKING
I will STOP taking the medications listed below when I get home from the hospital:    Mucinex DM  -- 1 dose(s) by mouth , As Needed

## 2018-06-03 NOTE — DISCHARGE NOTE ADULT - CARE PROVIDERS DIRECT ADDRESSES
[email or web address]

## 2018-06-03 NOTE — CHART NOTE - NSCHARTNOTEFT_GEN_A_CORE
Patient and family anxious for discharge today.  Declining rehab - wants home physical therapy and to continue with home care.  Repeat hgb today (after PRBC x 2 yesterday)                       9.9    5.82  )-----------( 159      ( 03 Jun 2018 08:41 )              29.6    Repeat dilantin level today (following po bolus and increase in dosage yesterday) = 11.0    No acute nasal bleeding appreciated - Seen by ENT this AM - they have cleared for discharge with out patient follow -up / packing removal with prophylactic antibiotic while packing is in place    Case Management contacted to arrange for Home Care / Home Physical Therapy    Discharge plan discussed with Dr Covington and Dr Brady - both in agreement with plan - continue current medications    Discharged as requested

## 2018-06-03 NOTE — DISCHARGE NOTE ADULT - CARE PLAN
Principal Discharge DX:	Epistaxis  Goal:	resolved  Assessment and plan of treatment:	follow up with the ENT doctor this coming week  Secondary Diagnosis:	AV block, Mobitz 1  Goal:	asymptomatic  Assessment and plan of treatment:	follow up with your cardiologist  Secondary Diagnosis:	Coronary artery disease  Goal:	risk modification  Assessment and plan of treatment:	Coronary artery disease is a condition where the arteries the supply the heart muscle get clogges with fatty deposits & puts you at risk for a heart attack  Call your doctor if you have any new pain, pressure, or discomfort in the center of your chest, pain, tingling or discomfort in arms, back, neck, jaw, or stomach, shortness of breath, nausea, vomiting, burping or heartburn, sweating, cold and clammy skin, racing or abnormal heartbeat for more than 10 minutes or if they keep coming & going.  Call 911 and do not tr to get to hospital by care  You can help yourself with lefestyle changes (quitting smoking if you smoke), eat lots of fruits & vegetables & low fat dairy products, not a lot of meat & fatty foods, walk or some form of physical activity most days of the week, lose weight if you are overweight  Take your cardiac medication as prescribed to lower cholesterol, to lower blood pressure, aspirin to prevent blood clots, and diabetes control  Make sure to keep appointments with doctor for cardiac follow up care

## 2018-06-03 NOTE — PROGRESS NOTE ADULT - SUBJECTIVE AND OBJECTIVE BOX
- Patient seen and examined.  - In summary, patient is a 82 year old man who presented with hemoptysis (31 May 2018 14:01)  - Today, patient is without complaints.         *****MEDICATIONS:    MEDICATIONS  (STANDING):  amitriptyline 10 milliGRAM(s) Oral two times a day  aspirin enteric coated 81 milliGRAM(s) Oral daily  azithromycin   Tablet 250 milliGRAM(s) Oral daily  buDESOnide 160 MICROgram(s)/formoterol 4.5 MICROgram(s) Inhaler 2 Puff(s) Inhalation two times a day  clopidogrel Tablet 75 milliGRAM(s) Oral daily  cyanocobalamin 500 MICROGram(s) Oral daily  finasteride 5 milliGRAM(s) Oral daily  fluticasone propionate 50 MICROgram(s)/spray Nasal Spray 1 Spray(s) Both Nostrils two times a day  folic acid 1 milliGRAM(s) Oral daily  levothyroxine 25 MICROGram(s) Oral <User Schedule>  levothyroxine 50 MICROGram(s) Oral <User Schedule>  midodrine 10 milliGRAM(s) Oral three times a day  montelukast 10 milliGRAM(s) Oral daily  phenytoin   Capsule 200 milliGRAM(s) Oral two times a day  polyethylene glycol 3350 17 Gram(s) Oral at bedtime  predniSONE   Tablet 5 milliGRAM(s) Oral daily  pyridoxine 100 milliGRAM(s) Oral daily  simvastatin 40 milliGRAM(s) Oral at bedtime  tamsulosin 0.4 milliGRAM(s) Oral at bedtime  tiotropium 18 MICROgram(s) Capsule 1 Capsule(s) Inhalation daily    MEDICATIONS  (PRN):  ALBUTerol    90 MICROgram(s) HFA Inhaler 1 Puff(s) Inhalation daily PRN Bronchospasm  bisacodyl 5 milliGRAM(s) Oral daily PRN Constipation               ***** REVIEW OF SYSTEM:  GEN: no fever, no chills, no pain  RESP: no SOB, no cough, no sputum  CVS: no chest pain, no palpitations, no edema  GI: no abdominal pain, no nausea, no vomiting, no constipation, no diarrhea  : no dysuria, no frequency  NEURO: no headache, no dizziness  PSYCH: no depression, not anxious  Derm : no itching, no rash         ***** VITAL SIGNS:    T(F): 97.5 (18 @ 04:00), Max: 98.1 (18 @ 20:55)  HR: 74 (18 @ 04:00) (74 - 81)  BP: 101/60 (18 @ 04:00) (101/60 - 124/52)  RR: 18 (18 @ 04:00) (18 - 18)  SpO2: 97% (18 @ 04:00) (95% - 97%)  Wt(kg): --  ,   I&O's Summary    2018 07:01  -  2018 07:00  --------------------------------------------------------  IN: 690 mL / OUT: 1575 mL / NET: -885 mL    2018 07:01  -  2018 09:30  --------------------------------------------------------  IN: 0 mL / OUT: 200 mL / NET: -200 mL                 *****PHYSICAL EXAM:  GEN: A&O X 3 , NAD , comfortable  HEENT: NCAT, EOMI, MMM, no icterus  NECK: Supple, No JVD  CVS: S1S2 , regular , No M/R/G appreciated  PULM: CTA B/L,  no W/R/R appreciated  ABD.: soft. non tender, non distended,  bowel sounds present  Extrem: intact pulses , no edema noted  Derm: No rash or ecchymosis noted  PSYCH: normal mood, no depression, not anxious         *****LAB AND IMAGIN.5    7.1   )-----------( 189      ( 2018 11:04 )             21.9                   139  |  103  |  23  ----------------------------<  76  3.8   |  28  |  0.64    Ca    9.2      2018 06:29              [All pertinent recent Imaging/Reports reviewed]         *****A S S E S S M E N T   A N D   P L A N :  82M  with CAD s/p recent stents orthostatic hypotension who presented with hemoptyses  tele mostly NSR, episodes Mobitz 1 HB   pt asymptomatic with no hx of dizziness or syncope  not on AVN affecting meds  f/u dilantin level  continue asa and Plavix s/p pci  continue midodrine  ent following      __________________________  A. TRE Brady.

## 2018-06-03 NOTE — PROGRESS NOTE ADULT - PROBLEM SELECTOR PLAN 1
Continue with nasal packing  ABX for packing prophylaxis  Nasal Saline B/L  Avoid Nasal Trauma  Avoid nasal trauma; no nose rubbing, blowing or manipulating nasal packing.  Sneeze with mouth open and pinching nares.  Avoid bending with head blow the waist, no heavy lifting.   Care per primary team.
Continue with nasal packing  ABX for packing prophylaxis  Nasal Saline B/L  Avoid Nasal Trauma  Avoid nasal trauma; no nose rubbing, blowing or manipulating nasal packing.  Sneeze with mouth open and pinching nares.  Avoid bending with head blow the waist, no heavy lifting.   Care per primary team
Prednisone 5 mg PO X1 now.  Prednisone 5 mg qd from am.
multifactorial--anemia, deconditioning, Chronic bronchitis, CAD--seemingly quiet

## 2018-06-03 NOTE — DISCHARGE NOTE ADULT - CARE PROVIDER_API CALL
Andrew Montgomery), Otolaryngology  [address]  [place] [address]  Phone: [phone]  Fax: [phone]    Lindsey Velasquez), Neurology  [address]  [place] [address]  Phone: [phone]  Fax: [phone]    Leif Santiago (MD), Cardiovascular Disease; Internal Medicine  [address] [place] [address]  Phone: [phone]  Fax: [phone]    William Juarez), Internal Medicine; Pulmonary Disease  [address]  [place] [address]  Phone: [phone]  Fax: [phone]

## 2018-06-03 NOTE — PHYSICAL THERAPY INITIAL EVALUATION ADULT - DISCHARGE DISPOSITION, PT EVAL
rehabilitation facility/sub acute rehab. but pt's family wants to d/c Home. If pt. d/c home, would recommend Home with PT and  assist for all functional mobility.

## 2018-06-03 NOTE — DISCHARGE NOTE ADULT - HOSPITAL COURSE
Presented to the hospital on 5/31/18 with complaint of hemoptysis since that morning.   Pt.and wife reported occasional clots and blood streaked sputum. Patient is on asa and Plavix for stents. Patient was discharged from hospital 2 days prior to this admission after stay for supra therapeutic dilantin levels.  Laryngoscopy revealed small amount of blood in post cricoid region at esophageal inlet. Right epistaxis began during exam and was controlled with Merocel nasal packing. Pt with history of recurrent epistaxis. Difficult to determine definitively whether source of hemoptysis is from esophagus or epistaxis, possibly both.  Nasal packing to be removed as out patient.  Required blood transfusion (2 units) on 6/2/18 for hemoglobin 7.3.   Hemoglobin on day of discharge 9.9  On telemetry while in hospital - brief episodes Mobitz 1 heart block noted - pt asymptomatic with no hx of dizziness or syncope, not on AVN affecting meds. Cardiology consulted and cleared patient for any further intervention.

## 2018-06-03 NOTE — DISCHARGE NOTE ADULT - ADDITIONAL INSTRUCTIONS
Avoid Nasal Trauma  Avoid nasal trauma; no nose rubbing, blowing or manipulating nasal packing.  Sneeze with mouth open and pinching nares.   Avoid bending with head below the waist, no heavy lifting  Make appointments with your physicians for follow up - See the ENT doctor for removal of nasal packing.  Bring all discharge paperwork including discharge medication list to follow up appointments

## 2018-06-03 NOTE — DISCHARGE NOTE ADULT - MEDICATION SUMMARY - MEDICATIONS TO TAKE
I will START or STAY ON the medications listed below when I get home from the hospital:    finasteride 5 mg oral tablet  -- 1 tab(s) by mouth once a day  -- Indication: For bph    predniSONE 5 mg oral tablet  -- 1 tab(s) by mouth once a day  -- Indication: For Prophylaxis    aspirin 81 mg oral delayed release tablet  -- 1 tab(s) by mouth once a day  -- Indication: For Coronary artery disease    tamsulosin 0.4 mg oral capsule  -- 1 cap(s) by mouth once a day  -- Indication: For bph    phenytoin 200 mg oral capsule, extended release  -- 1 cap(s) by mouth 2 times a day  -- Indication: For Seizure prophylaxis    amitriptyline 10 mg oral tablet  -- 1 tab(s) by mouth 2 times a day  -- Indication: For depression    ezetimibe 10 mg oral tablet  -- 1 tab(s) by mouth once a day  -- Indication: For HLD    simvastatin 40 mg oral tablet  -- 1 tab(s) by mouth once a day  -- Indication: For HLD    clopidogrel 75 mg oral tablet  -- 1 tab(s) by mouth once a day MDD:1  -- Indication: For Coronary artery disease    ALPRAZolam 0.25 mg oral tablet  -- 2 tab(s) by mouth 2 times a day  -- Indication: For Anxiety    ProAir HFA 90 mcg/inh inhalation aerosol  -- 2 puff(s) inhaled , As Needed  -- Indication: For Shortness of breath    Spiriva Respimat 2.5 mcg/inh inhalation aerosol  -- 1 or 2 puff(s) inhaled once a day, As Needed  -- Indication: For Shortness of breath    montelukast 10 mg oral tablet  -- 1 tab(s) by mouth once a day  -- Indication: For Shortness of breath    Azithromycin 5 Day Dose Pack 250 mg oral tablet  -- 1 tab(s) by mouth once a day  -- Indication: For Prophylaxis while nasal packing in    midodrine 10 mg oral tablet  -- 1 tab(s) by mouth once a day  -- Indication: For Hypotension    fluticasone 50 mcg/inh nasal spray  -- 1 spray(s) into nose 2 times a day  -- Indication: For nasal congestion    Saline Nasal Mist 0.65% nasal solution  -- 2 spray(s) into nose every 6 hours, As Needed   -- Indication: For dry nares    levothyroxine 25 mcg (0.025 mg) oral tablet  -- 1 tab(s) by mouth on ODD numbered days; and 2 tab(s) by mouth on EVEN numbered days  -- Indication: For Hypothyroid    Vitamin B6 100 mg oral tablet  -- Indication: For Suppliment    folic acid 0.4 mg oral tablet  -- Indication: For Suppliment    cyanocobalamin 500 mcg oral tablet  -- 1 tab(s) by mouth once a day  -- Indication: For Suppliment

## 2018-06-03 NOTE — PHYSICAL THERAPY INITIAL EVALUATION ADULT - ADDITIONAL COMMENTS
As per pt's family pt. lives with wife in apt. no steps to enter, No HHA services. walks with walker out door with one person beside. uses w/c. As per pt's family pt. lives with wife in apt. no steps to enter, No HHA services.Has commode at home. walks with walker out door with one person beside. uses w/c.

## 2018-06-03 NOTE — PHYSICAL THERAPY INITIAL EVALUATION ADULT - BALANCE TRAINING, PT EVAL
GOAL: Pt will improve  balance during (static/dynamic) (standing) activities by at least 1 balance grade within 3-4 weeks to assist with greater independence during functional mobility and ADL's.

## 2018-06-03 NOTE — PROGRESS NOTE ADULT - SUBJECTIVE AND OBJECTIVE BOX
POD/STATUS POST/ENT ISSUE: Epistaxis    INTERVAL HPI: pt seen & examined, resting with comfort denies epistaxis/hemoptysis    Vital Signs Last 24 Hrs  T(C): 36.4 (03 Jun 2018 04:00), Max: 36.7 (02 Jun 2018 20:55)  T(F): 97.5 (03 Jun 2018 04:00), Max: 98.1 (02 Jun 2018 20:55)  HR: 74 (03 Jun 2018 04:00) (74 - 81)  BP: 101/60 (03 Jun 2018 04:00) (101/60 - 124/52)  RR: 18 (03 Jun 2018 04:00) (18 - 18)  SpO2: 97% (03 Jun 2018 04:00) (95% - 97%)    PHYSICAL EXAM:  Gen: NAD, well-developed  Head: Normocephalic, Atraumatic  Eyes: no scleral injection  Nose:R Merocel in place, with no bleeding or drainage L Nare clean patent dry  Mouth: Mucosa moist, tongue/uvula midline, oropharynx clear  Neck: Flat, supple, no lymphadenopathy, trachea midline, no masses  Resp: breathing easily, no stridor      LABS:                        7.5    7.1   )-----------( 189      ( 02 Jun 2018 11:04 )             21.9

## 2018-06-03 NOTE — PROGRESS NOTE ADULT - ASSESSMENT
83 y/o male with h/o CAD s/p 6 stents (most recent 4/2018), Esophageal CA s/p chemo/RT and esophagogastrectomy (8/14/2017) c/o  hemoptysis since this morning. Reports mucus mixed with blood/clots. Pt noted on ASA and plavix for stents. Laryngoscopy revealed small amount of blood in post cricoid region at esophageal inlet. Right epistaxis began during exam, controlled with merocel nasal packing. Pt with history of recurrent epistaxis. Difficult to determine definitively whether source of hemoptysis is from esophagus or epistaxis, possibly both.

## 2018-06-04 ENCOUNTER — APPOINTMENT (OUTPATIENT)
Dept: OTOLARYNGOLOGY | Facility: CLINIC | Age: 83
End: 2018-06-04
Payer: MEDICARE

## 2018-06-04 ENCOUNTER — LABORATORY RESULT (OUTPATIENT)
Age: 83
End: 2018-06-04

## 2018-06-04 VITALS
RESPIRATION RATE: 67 BRPM | SYSTOLIC BLOOD PRESSURE: 91 MMHG | DIASTOLIC BLOOD PRESSURE: 56 MMHG | WEIGHT: 127 LBS | BODY MASS INDEX: 22.5 KG/M2 | HEIGHT: 63 IN

## 2018-06-04 PROCEDURE — 99214 OFFICE O/P EST MOD 30 MIN: CPT | Mod: 25,PD

## 2018-06-04 PROCEDURE — 31238 NSL/SINS NDSC SRG NSL HEMRRG: CPT | Mod: 50,PD

## 2018-06-04 RX ORDER — LEVOTHYROXINE SODIUM 0.03 MG/1
25 TABLET ORAL
Qty: 30 | Refills: 0 | Status: ACTIVE | COMMUNITY
Start: 2018-05-03

## 2018-06-05 ENCOUNTER — APPOINTMENT (OUTPATIENT)
Dept: OTOLARYNGOLOGY | Facility: CLINIC | Age: 83
End: 2018-06-05
Payer: MEDICARE

## 2018-06-05 ENCOUNTER — APPOINTMENT (OUTPATIENT)
Dept: PULMONOLOGY | Facility: CLINIC | Age: 83
End: 2018-06-05

## 2018-06-05 ENCOUNTER — INPATIENT (INPATIENT)
Facility: HOSPITAL | Age: 83
LOS: 2 days | Discharge: HOME | DRG: 155 | End: 2018-06-08
Attending: INTERNAL MEDICINE | Admitting: INTERNAL MEDICINE
Payer: MEDICARE

## 2018-06-05 VITALS
OXYGEN SATURATION: 98 % | HEART RATE: 76 BPM | RESPIRATION RATE: 18 BRPM | SYSTOLIC BLOOD PRESSURE: 89 MMHG | DIASTOLIC BLOOD PRESSURE: 51 MMHG

## 2018-06-05 VITALS
HEART RATE: 71 BPM | HEIGHT: 63 IN | BODY MASS INDEX: 22.5 KG/M2 | WEIGHT: 127 LBS | SYSTOLIC BLOOD PRESSURE: 73 MMHG | DIASTOLIC BLOOD PRESSURE: 42 MMHG

## 2018-06-05 DIAGNOSIS — Z98.890 OTHER SPECIFIED POSTPROCEDURAL STATES: Chronic | ICD-10-CM

## 2018-06-05 DIAGNOSIS — Z90.89 ACQUIRED ABSENCE OF OTHER ORGANS: Chronic | ICD-10-CM

## 2018-06-05 DIAGNOSIS — Z95.5 PRESENCE OF CORONARY ANGIOPLASTY IMPLANT AND GRAFT: Chronic | ICD-10-CM

## 2018-06-05 DIAGNOSIS — Z87.09 PERSONAL HISTORY OF OTHER DISEASES OF THE RESPIRATORY SYSTEM: ICD-10-CM

## 2018-06-05 DIAGNOSIS — K91.89 OTHER POSTPROCEDURAL COMPLICATIONS AND DISORDERS OF DIGESTIVE SYSTEM: Chronic | ICD-10-CM

## 2018-06-05 DIAGNOSIS — Z86.39 PERSONAL HISTORY OF OTHER ENDOCRINE, NUTRITIONAL AND METABOLIC DISEASE: ICD-10-CM

## 2018-06-05 DIAGNOSIS — Z86.79 PERSONAL HISTORY OF OTHER DISEASES OF THE CIRCULATORY SYSTEM: ICD-10-CM

## 2018-06-05 DIAGNOSIS — J39.2 OTHER DISEASES OF PHARYNX: ICD-10-CM

## 2018-06-05 DIAGNOSIS — Z96.649 PRESENCE OF UNSPECIFIED ARTIFICIAL HIP JOINT: Chronic | ICD-10-CM

## 2018-06-05 DIAGNOSIS — M12.9 ARTHROPATHY, UNSPECIFIED: Chronic | ICD-10-CM

## 2018-06-05 DIAGNOSIS — Z83.2 FAMILY HISTORY OF DISEASES OF THE BLOOD AND BLOOD-FORMING ORGANS AND CERTAIN DISORDERS INVOLVING THE IMMUNE MECHANISM: ICD-10-CM

## 2018-06-05 DIAGNOSIS — Z82.2 FAMILY HISTORY OF DEAFNESS AND HEARING LOSS: ICD-10-CM

## 2018-06-05 LAB
ALBUMIN SERPL ELPH-MCNC: 2.9 G/DL — LOW (ref 3.3–5)
ALP SERPL-CCNC: 184 U/L — HIGH (ref 40–120)
ALT FLD-CCNC: 35 U/L — SIGNIFICANT CHANGE UP (ref 10–45)
ANION GAP SERPL CALC-SCNC: 12 MMOL/L — SIGNIFICANT CHANGE UP (ref 5–17)
APTT BLD: 32 SEC — SIGNIFICANT CHANGE UP (ref 27.5–37.4)
AST SERPL-CCNC: 44 U/L — HIGH (ref 10–40)
BASOPHILS # BLD AUTO: 0 K/UL — SIGNIFICANT CHANGE UP (ref 0–0.2)
BASOPHILS NFR BLD AUTO: 0.4 % — SIGNIFICANT CHANGE UP (ref 0–2)
BILIRUB SERPL-MCNC: 0.2 MG/DL — SIGNIFICANT CHANGE UP (ref 0.2–1.2)
BLD GP AB SCN SERPL QL: NEGATIVE — SIGNIFICANT CHANGE UP
BUN SERPL-MCNC: 36 MG/DL — HIGH (ref 7–23)
CALCIUM SERPL-MCNC: 9.1 MG/DL — SIGNIFICANT CHANGE UP (ref 8.4–10.5)
CHLORIDE SERPL-SCNC: 103 MMOL/L — SIGNIFICANT CHANGE UP (ref 96–108)
CO2 SERPL-SCNC: 25 MMOL/L — SIGNIFICANT CHANGE UP (ref 22–31)
CREAT SERPL-MCNC: 0.82 MG/DL — SIGNIFICANT CHANGE UP (ref 0.5–1.3)
EOSINOPHIL # BLD AUTO: 0.1 K/UL — SIGNIFICANT CHANGE UP (ref 0–0.5)
EOSINOPHIL NFR BLD AUTO: 1.6 % — SIGNIFICANT CHANGE UP (ref 0–6)
GAS PNL BLDV: SIGNIFICANT CHANGE UP
GLUCOSE SERPL-MCNC: 118 MG/DL — HIGH (ref 70–99)
HCT VFR BLD CALC: 28 % — LOW (ref 39–50)
HGB BLD-MCNC: 9.3 G/DL — LOW (ref 13–17)
INR BLD: 1.07 RATIO — SIGNIFICANT CHANGE UP (ref 0.88–1.16)
LIDOCAIN IGE QN: 7 U/L — SIGNIFICANT CHANGE UP (ref 7–60)
LYMPHOCYTES # BLD AUTO: 0.5 K/UL — LOW (ref 1–3.3)
LYMPHOCYTES # BLD AUTO: 7.1 % — LOW (ref 13–44)
MCHC RBC-ENTMCNC: 31.4 PG — SIGNIFICANT CHANGE UP (ref 27–34)
MCHC RBC-ENTMCNC: 33.3 GM/DL — SIGNIFICANT CHANGE UP (ref 32–36)
MCV RBC AUTO: 94.5 FL — SIGNIFICANT CHANGE UP (ref 80–100)
MONOCYTES # BLD AUTO: 0.4 K/UL — SIGNIFICANT CHANGE UP (ref 0–0.9)
MONOCYTES NFR BLD AUTO: 5.7 % — SIGNIFICANT CHANGE UP (ref 2–14)
NEUTROPHILS # BLD AUTO: 6.2 K/UL — SIGNIFICANT CHANGE UP (ref 1.8–7.4)
NEUTROPHILS NFR BLD AUTO: 85.3 % — HIGH (ref 43–77)
PLATELET # BLD AUTO: 223 K/UL — SIGNIFICANT CHANGE UP (ref 150–400)
POTASSIUM SERPL-MCNC: 4.5 MMOL/L — SIGNIFICANT CHANGE UP (ref 3.5–5.3)
POTASSIUM SERPL-SCNC: 4.5 MMOL/L — SIGNIFICANT CHANGE UP (ref 3.5–5.3)
PROT SERPL-MCNC: 6.6 G/DL — SIGNIFICANT CHANGE UP (ref 6–8.3)
PROTHROM AB SERPL-ACNC: 11.6 SEC — SIGNIFICANT CHANGE UP (ref 9.8–12.7)
RBC # BLD: 2.97 M/UL — LOW (ref 4.2–5.8)
RBC # FLD: 16.4 % — HIGH (ref 10.3–14.5)
RH IG SCN BLD-IMP: POSITIVE — SIGNIFICANT CHANGE UP
SODIUM SERPL-SCNC: 140 MMOL/L — SIGNIFICANT CHANGE UP (ref 135–145)
WBC # BLD: 7.2 K/UL — SIGNIFICANT CHANGE UP (ref 3.8–10.5)
WBC # FLD AUTO: 7.2 K/UL — SIGNIFICANT CHANGE UP (ref 3.8–10.5)

## 2018-06-05 PROCEDURE — 99214 OFFICE O/P EST MOD 30 MIN: CPT | Mod: 25,PD

## 2018-06-05 PROCEDURE — 99024 POSTOP FOLLOW-UP VISIT: CPT

## 2018-06-05 PROCEDURE — 99285 EMERGENCY DEPT VISIT HI MDM: CPT | Mod: 25,GC

## 2018-06-05 PROCEDURE — 93010 ELECTROCARDIOGRAM REPORT: CPT

## 2018-06-05 PROCEDURE — 30903 CONTROL OF NOSEBLEED: CPT | Mod: RT,PD

## 2018-06-05 RX ORDER — ALBUTEROL 90 UG/1
1 AEROSOL, METERED ORAL
Qty: 0 | Refills: 0 | Status: DISCONTINUED | OUTPATIENT
Start: 2018-06-05 | End: 2018-06-08

## 2018-06-05 RX ORDER — BACITRACIN ZINC 500 UNIT/G
1 OINTMENT IN PACKET (EA) TOPICAL THREE TIMES A DAY
Qty: 0 | Refills: 0 | Status: DISCONTINUED | OUTPATIENT
Start: 2018-06-05 | End: 2018-06-06

## 2018-06-05 RX ORDER — ALPRAZOLAM 0.25 MG
0.5 TABLET ORAL
Qty: 0 | Refills: 0 | Status: DISCONTINUED | OUTPATIENT
Start: 2018-06-05 | End: 2018-06-06

## 2018-06-05 RX ORDER — PREGABALIN 225 MG/1
500 CAPSULE ORAL DAILY
Qty: 0 | Refills: 0 | Status: DISCONTINUED | OUTPATIENT
Start: 2018-06-05 | End: 2018-06-08

## 2018-06-05 RX ORDER — FLUTICASONE PROPIONATE 50 MCG
1 SPRAY, SUSPENSION NASAL
Qty: 0 | Refills: 0 | Status: DISCONTINUED | OUTPATIENT
Start: 2018-06-05 | End: 2018-06-08

## 2018-06-05 RX ORDER — TAMSULOSIN HYDROCHLORIDE 0.4 MG/1
0.4 CAPSULE ORAL AT BEDTIME
Qty: 0 | Refills: 0 | Status: DISCONTINUED | OUTPATIENT
Start: 2018-06-05 | End: 2018-06-08

## 2018-06-05 RX ORDER — CLOPIDOGREL BISULFATE 75 MG/1
75 TABLET, FILM COATED ORAL DAILY
Qty: 0 | Refills: 0 | Status: DISCONTINUED | OUTPATIENT
Start: 2018-06-05 | End: 2018-06-08

## 2018-06-05 RX ORDER — AMITRIPTYLINE HCL 25 MG
10 TABLET ORAL
Qty: 0 | Refills: 0 | Status: DISCONTINUED | OUTPATIENT
Start: 2018-06-05 | End: 2018-06-08

## 2018-06-05 RX ORDER — MONTELUKAST 4 MG/1
10 TABLET, CHEWABLE ORAL DAILY
Qty: 0 | Refills: 0 | Status: DISCONTINUED | OUTPATIENT
Start: 2018-06-05 | End: 2018-06-08

## 2018-06-05 RX ORDER — SIMVASTATIN 20 MG/1
20 TABLET, FILM COATED ORAL AT BEDTIME
Qty: 0 | Refills: 0 | Status: DISCONTINUED | OUTPATIENT
Start: 2018-06-05 | End: 2018-06-08

## 2018-06-05 RX ORDER — LEVOTHYROXINE SODIUM 125 MCG
25 TABLET ORAL DAILY
Qty: 0 | Refills: 0 | Status: DISCONTINUED | OUTPATIENT
Start: 2018-06-05 | End: 2018-06-08

## 2018-06-05 RX ORDER — FOLIC ACID 0.8 MG
1 TABLET ORAL DAILY
Qty: 0 | Refills: 0 | Status: DISCONTINUED | OUTPATIENT
Start: 2018-06-05 | End: 2018-06-08

## 2018-06-05 RX ORDER — ASPIRIN/CALCIUM CARB/MAGNESIUM 324 MG
81 TABLET ORAL DAILY
Qty: 0 | Refills: 0 | Status: DISCONTINUED | OUTPATIENT
Start: 2018-06-05 | End: 2018-06-08

## 2018-06-05 RX ORDER — PYRIDOXINE HCL (VITAMIN B6) 100 MG
100 TABLET ORAL DAILY
Qty: 0 | Refills: 0 | Status: DISCONTINUED | OUTPATIENT
Start: 2018-06-05 | End: 2018-06-08

## 2018-06-05 RX ORDER — MIDODRINE HYDROCHLORIDE 2.5 MG/1
10 TABLET ORAL EVERY 8 HOURS
Qty: 0 | Refills: 0 | Status: DISCONTINUED | OUTPATIENT
Start: 2018-06-05 | End: 2018-06-08

## 2018-06-05 RX ORDER — TIOTROPIUM BROMIDE 18 UG/1
1 CAPSULE ORAL; RESPIRATORY (INHALATION) DAILY
Qty: 0 | Refills: 0 | Status: DISCONTINUED | OUTPATIENT
Start: 2018-06-05 | End: 2018-06-08

## 2018-06-05 RX ORDER — SODIUM CHLORIDE 0.65 %
1 AEROSOL, SPRAY (ML) NASAL THREE TIMES A DAY
Qty: 0 | Refills: 0 | Status: DISCONTINUED | OUTPATIENT
Start: 2018-06-05 | End: 2018-06-08

## 2018-06-05 RX ORDER — FINASTERIDE 5 MG/1
5 TABLET, FILM COATED ORAL DAILY
Qty: 0 | Refills: 0 | Status: DISCONTINUED | OUTPATIENT
Start: 2018-06-05 | End: 2018-06-08

## 2018-06-05 RX ORDER — MIDODRINE HYDROCHLORIDE 2.5 MG/1
5 TABLET ORAL THREE TIMES A DAY
Qty: 0 | Refills: 0 | Status: DISCONTINUED | OUTPATIENT
Start: 2018-06-05 | End: 2018-06-05

## 2018-06-05 RX ADMIN — Medication 200 MILLIGRAM(S): at 22:19

## 2018-06-05 RX ADMIN — CLOPIDOGREL BISULFATE 75 MILLIGRAM(S): 75 TABLET, FILM COATED ORAL at 18:17

## 2018-06-05 RX ADMIN — SIMVASTATIN 20 MILLIGRAM(S): 20 TABLET, FILM COATED ORAL at 22:19

## 2018-06-05 RX ADMIN — FINASTERIDE 5 MILLIGRAM(S): 5 TABLET, FILM COATED ORAL at 18:17

## 2018-06-05 RX ADMIN — Medication 10 MILLIGRAM(S): at 22:19

## 2018-06-05 RX ADMIN — TAMSULOSIN HYDROCHLORIDE 0.4 MILLIGRAM(S): 0.4 CAPSULE ORAL at 22:18

## 2018-06-05 RX ADMIN — MONTELUKAST 10 MILLIGRAM(S): 4 TABLET, CHEWABLE ORAL at 18:17

## 2018-06-05 RX ADMIN — Medication 81 MILLIGRAM(S): at 18:17

## 2018-06-05 RX ADMIN — MIDODRINE HYDROCHLORIDE 10 MILLIGRAM(S): 2.5 TABLET ORAL at 18:14

## 2018-06-05 RX ADMIN — Medication 5 MILLIGRAM(S): at 18:11

## 2018-06-05 NOTE — ED PROVIDER NOTE - CARE PLAN
Principal Discharge DX:	Nasopharyngeal bleed  Secondary Diagnosis:	Guaiac + stool  Secondary Diagnosis:	Transient hypotension

## 2018-06-05 NOTE — CONSULT NOTE ADULT - SUBJECTIVE AND OBJECTIVE BOX
Patient is a 82y Male     Patient is a 82y old  Male who presents with a chief complaint of hemoptysis/anemia/epistaxis (05 Jun 2018 16:41)      HPI:  CHIEF COMPLAINT:Patient is a 82y old  Male who presents with a chief complaint of     HPI:Patient presents with chief complaint of nasal bleeding and spitting up after presenting from ENT office. Patient has also been having black stools. Yesterday he saw ENT as well with removal of a pyogenic granuloma.  	Patient has had dark stools x 1 day. Patient has been spitting up blood for > 1 month. No chest pain, shortness of breath. Patient has been fatigued.   	Patient is on midodrine.  	Patient normally walks with a walker, was able to walk yesterday but not today.  	PT is on plavix and aspirin for coronary artery disease with stents.        PAST MEDICAL & SURGICAL HISTORY:  Coronary artery disease: s/p 3 stents on June 30, 2017 at [place] Dr. 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  History of tonsillectomy  H/O heart artery stent: June 30, 2017  Knee arthropathy: left  History of total hip replacement: left  History of hernia repair      MEDICATIONS  (STANDING):  noted    MEDICATIONS  (PRN):      FAMILY HISTORY:  Family history of heart attack  Family history of pulmonary embolism: father @ 49 yr old  FH: CAD (coronary artery disease) (Sibling)      SOCIAL HISTORY:    [ ] Non-smoker  [ ] Smoker  [ ] Alcohol    Allergies    penicillin (Rash)    Intolerances    	    REVIEW OF SYSTEMS:  CONSTITUTIONAL: No fever, + weight loss, + fatigue  EYES: No eye pain, visual disturbances, or discharge  ENT:  No difficulty hearing, tinnitus, vertigo; No sinus or throat pain  NECK: No pain or stiffness  RESPIRATORY: No cough, wheezing, chills or hemoptysis; + mild Shortness of Breath  CARDIOVASCULAR: No chest pain, palpitations, passing out, dizziness, or leg swelling  GASTROINTESTINAL: No abdominal or epigastric pain. No nausea, vomiting, or hematemesis; No diarrhea or constipation. No melena or hematochezia.  GENITOURINARY: No dysuria, frequency, hematuria, or incontinence  NEUROLOGICAL: No headaches, memory loss, loss of strength, numbness, or tremors  SKIN: No itching, burning, rashes, or lesions   LYMPH Nodes: No enlarged glands  ENDOCRINE: No heat or cold intolerance; No hair loss  MUSCULOSKELETAL: No joint pain or swelling; No muscle, back, or extremity pain  PSYCHIATRIC: No depression, anxiety, mood swings, or difficulty sleeping  HEME/LYMPH: No easy bruising, or bleeding gums  ALLERGY AND IMMUNOLOGIC: No hives or eczema	    [ ] All others negative	  [ ] Unable to obtain    PHYSICAL EXAM:  T(C): 36.4 (06-05-18 @ 15:35), Max: 36.4 (06-05-18 @ 15:35)  HR: 65 (06-05-18 @ 15:35) (65 - 76)  BP: 100/60 (06-05-18 @ 15:35) (89/51 - 108/61)  RR: 18 (06-05-18 @ 15:35) (18 - 18)  SpO2: 99% (06-05-18 @ 15:35) (98% - 99%)  Wt(kg): --  I&O's Summary      Appearance: Normal	  HEENT:   Normal oral mucosa, PERRL, EOMI	  Lymphatic: No lymphadenopathy  Cardiovascular: Normal S1 S2, No JVD, + murmurs, No edema  Respiratory: Lungs clear to auscultation	  Psychiatry: A & O x 3, Mood & affect appropriate  Gastrointestinal:  Soft, Non-tender, + BS	  Skin: No rashes, No ecchymoses, No cyanosis	  Neurologic: Non-focal  Extremities: Normal range of motion, No clubbing, cyanosis or edema  Vascular: Peripheral pulses palpable 2+ bilaterally    TELEMETRY: 	    ECG:  	  RADIOLOGY:  OTHER: 	  	  LABS:	 	    CARDIAC MARKERS:                              9.3    7.2   )-----------( 223      ( 05 Jun 2018 13:11 )             28.0     06-05    140  |  103  |  36<H>  ----------------------------<  118<H>  4.5   |  25  |  0.82    Ca    9.1      05 Jun 2018 13:11    TPro  6.6  /  Alb  2.9<L>  /  TBili  0.2  /  DBili  x   /  AST  44<H>  /  ALT  35  /  AlkPhos  184<H>  06-05    proBNP:   Lipid Profile:   HgA1c:   TSH:   PT/INR - ( 05 Jun 2018 13:11 )   PT: 11.6 sec;   INR: 1.07 ratio         PTT - ( 05 Jun 2018 13:11 )  PTT:32.0 sec    PREVIOUS DIAGNOSTIC TESTING:    < from: Transthoracic Echocardiogram (04.21.18 @ 14:23) >  1. Mitral annular calcification, otherwise normal mitral  valve. Mild mitral regurgitation. Mean transmitral valve  gradient equals 4 mm Hg, consistent with mild mitral  stenosis. (HRabout 60s bpm)  2. Calcified trileaflet aortic valve with decreased  opening. Peak transaortic valve gradient equals 34 mm Hg,  mean transaortic valve gradient equals 19 mm Hg, estimated  aortic valve area equals 1.3 sqcm (by continuity equation),  aortic valve velocity time integral equals 59 cm,  consistent with moderate aortic stenosis. Aortic valve  assessment is limited by the presence of frequent PVCs and  the degree of aortic stenosis may be underestimated.  Visually, the aortic valve appears moderate-severely  stenotic. Consider additional imaging of the aortic valve  such as with additional TTE or TINO imaging if clinically  indicated.No aortic valve regurgitation seen.  3. Severely dilated left atrium.  LA volume index = 64  cc/m2.  4. Endocardium not well visualized; grossly normal left  ventricular systolic function. Septal motion consistent  with conduction defect. There is a false tendon in the LV  cavity (normal variant).  5. The right ventricle is not well visualized; right  ventricle appears enlarged with normal systolic function. (05 Jun 2018 16:41)      PAST MEDICAL & SURGICAL HISTORY:  Coronary artery disease: s/p 3 stents on June 30, 2017 at [place] Dr. 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  History of tonsillectomy  H/O heart artery stent: June 30, 2017  Knee arthropathy: left  History of total hip replacement: left  History of hernia repair      MEDICATIONS  (STANDING):  amitriptyline 10 milliGRAM(s) Oral two times a day  aspirin enteric coated 81 milliGRAM(s) Oral daily  clopidogrel Tablet 75 milliGRAM(s) Oral daily  cyanocobalamin 500 MICROGram(s) Oral daily  finasteride 5 milliGRAM(s) Oral daily  fluticasone propionate 50 MICROgram(s)/spray Nasal Spray 1 Spray(s) Both Nostrils two times a day  folic acid 1 milliGRAM(s) Oral daily  levothyroxine 25 MICROGram(s) Oral daily  midodrine 10 milliGRAM(s) Oral every 8 hours  montelukast 10 milliGRAM(s) Oral daily  phenytoin   Capsule 200 milliGRAM(s) Oral two times a day  predniSONE   Tablet 5 milliGRAM(s) Oral daily  pyridoxine 100 milliGRAM(s) Oral daily  simvastatin 20 milliGRAM(s) Oral at bedtime  tamsulosin 0.4 milliGRAM(s) Oral at bedtime  tiotropium 18 MICROgram(s) Capsule 1 Capsule(s) Inhalation daily      Allergies    penicillin (Rash)    Intolerances        SOCIAL HISTORY:  Denies ETOh,Smoking,     FAMILY HISTORY:  Family history of heart attack  Family history of pulmonary embolism: father @ 49 yr old  FH: CAD (coronary artery disease) (Sibling)      REVIEW OF SYSTEMS:    CONSTITUTIONAL: No weakness, fevers or chills  EYES/ENT: No visual changes;  No vertigo or throat pain   NECK: No pain or stiffness  RESPIRATORY: No cough, wheezing, hemoptysis; No shortness of breath  CARDIOVASCULAR: No chest pain or palpitations  GASTROINTESTINAL: No abdominal or epigastric pain. No nausea, vomiting, or hematemesis; No diarrhea or constipation. No melena or hematochezia.  GENITOURINARY: No dysuria, frequency or hematuria  NEUROLOGICAL: No numbness or weakness  SKIN: No itching, burning, rashes, or lesions   All other review of systems is negative unless indicated above.    VITAL:  T(C): , Max: 36.4 (06-05-18 @ 15:35)  T(F): , Max: 97.5 (06-05-18 @ 15:35)  HR: 81 (06-05-18 @ 16:49)  BP: 90/46 (06-05-18 @ 16:49)  BP(mean): --  RR: 20 (06-05-18 @ 16:49)  SpO2: 97% (06-05-18 @ 16:49)  Wt(kg): --    I and O's:    Height (cm): 160.02 (06-05 @ 16:49)  Weight (kg): 52.9 (06-05 @ 16:49)  BMI (kg/m2): 20.7 (06-05 @ 16:49)  BSA (m2): 1.54 (06-05 @ 16:49)    PHYSICAL EXAM:      LABS:                        9.3    7.2   )-----------( 223      ( 05 Jun 2018 13:11 )             28.0     06-05    140  |  103  |  36<H>  ----------------------------<  118<H>  4.5   |  25  |  0.82    Ca    9.1      05 Jun 2018 13:11    TPro  6.6  /  Alb  2.9<L>  /  TBili  0.2  /  DBili  x   /  AST  44<H>  /  ALT  35  /  AlkPhos  184<H>  06-05          RADIOLOGY & ADDITIONAL STUDIES:

## 2018-06-05 NOTE — H&P ADULT - PMH
Anxiety    Asthma    BCC (basal cell carcinoma)  skin  BPH (benign prostatic hyperplasia)    Cerebrovascular accident (CVA)    Chronic allergic rhinitis    Coronary artery disease  s/p 3 stents on June 30, 2017 at [place] Dr. Lavelle Reid  Esophagus cancer    Former smoker, stopped smoking in distant past    HTN (hypertension)    Hyperlipidemia    OA (osteoarthritis) of knee  right  Rheumatoid arthritis    Seizure disorder  1 episode approximately 15 yrs ago

## 2018-06-05 NOTE — ED ADULT NURSE NOTE - PMH
Anxiety    Asthma    BCC (basal cell carcinoma)  skin  BPH (benign prostatic hyperplasia)    Cerebrovascular accident (CVA)    Chronic allergic rhinitis    Coronary artery disease  s/p 3 stents on June 30, 2017 at [place] Dr. Lavelle Reid  Esophagus cancer    Former smoker, stopped smoking in distant past    HTN (hypertension)    Hyperlipidemia    OA (osteoarthritis) of knee  right  Rheumatoid arthritis    Seizure disorder  1 episode approximately 15 yrs ago

## 2018-06-05 NOTE — H&P ADULT - HISTORY OF PRESENT ILLNESS
CHIEF COMPLAINT:Patient is a 82y old  Male who presents with a chief complaint of     HPI:Patient presents with chief complaint of nasal bleeding and spitting up after presenting from ENT office. Patient has also been having black stools. Yesterday he saw ENT as well with removal of a pyogenic granuloma.  	Patient has had dark stools x 1 day. Patient has been spitting up blood for > 1 month. No chest pain, shortness of breath. Patient has been fatigued.   	Patient is on midodrine.  	Patient normally walks with a walker, was able to walk yesterday but not today.  	PT is on plavix and aspirin for coronary artery disease with stents.        PAST MEDICAL & SURGICAL HISTORY:  Coronary artery disease: s/p 3 stents on June 30, 2017 at [place] Dr. 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  History of tonsillectomy  H/O heart artery stent: June 30, 2017  Knee arthropathy: left  History of total hip replacement: left  History of hernia repair      MEDICATIONS  (STANDING):  noted    MEDICATIONS  (PRN):      FAMILY HISTORY:  Family history of heart attack  Family history of pulmonary embolism: father @ 49 yr old  FH: CAD (coronary artery disease) (Sibling)      SOCIAL HISTORY:    [ ] Non-smoker  [ ] Smoker  [ ] Alcohol    Allergies    penicillin (Rash)    Intolerances    	    REVIEW OF SYSTEMS:  CONSTITUTIONAL: No fever, + weight loss, + fatigue  EYES: No eye pain, visual disturbances, or discharge  ENT:  No difficulty hearing, tinnitus, vertigo; No sinus or throat pain  NECK: No pain or stiffness  RESPIRATORY: No cough, wheezing, chills or hemoptysis; + mild Shortness of Breath  CARDIOVASCULAR: No chest pain, palpitations, passing out, dizziness, or leg swelling  GASTROINTESTINAL: No abdominal or epigastric pain. No nausea, vomiting, or hematemesis; No diarrhea or constipation. No melena or hematochezia.  GENITOURINARY: No dysuria, frequency, hematuria, or incontinence  NEUROLOGICAL: No headaches, memory loss, loss of strength, numbness, or tremors  SKIN: No itching, burning, rashes, or lesions   LYMPH Nodes: No enlarged glands  ENDOCRINE: No heat or cold intolerance; No hair loss  MUSCULOSKELETAL: No joint pain or swelling; No muscle, back, or extremity pain  PSYCHIATRIC: No depression, anxiety, mood swings, or difficulty sleeping  HEME/LYMPH: No easy bruising, or bleeding gums  ALLERGY AND IMMUNOLOGIC: No hives or eczema	    [ ] All others negative	  [ ] Unable to obtain    PHYSICAL EXAM:  T(C): 36.4 (06-05-18 @ 15:35), Max: 36.4 (06-05-18 @ 15:35)  HR: 65 (06-05-18 @ 15:35) (65 - 76)  BP: 100/60 (06-05-18 @ 15:35) (89/51 - 108/61)  RR: 18 (06-05-18 @ 15:35) (18 - 18)  SpO2: 99% (06-05-18 @ 15:35) (98% - 99%)  Wt(kg): --  I&O's Summary      Appearance: Normal	  HEENT:   Normal oral mucosa, PERRL, EOMI	  Lymphatic: No lymphadenopathy  Cardiovascular: Normal S1 S2, No JVD, + murmurs, No edema  Respiratory: Lungs clear to auscultation	  Psychiatry: A & O x 3, Mood & affect appropriate  Gastrointestinal:  Soft, Non-tender, + BS	  Skin: No rashes, No ecchymoses, No cyanosis	  Neurologic: Non-focal  Extremities: Normal range of motion, No clubbing, cyanosis or edema  Vascular: Peripheral pulses palpable 2+ bilaterally    TELEMETRY: 	    ECG:  	  RADIOLOGY:  OTHER: 	  	  LABS:	 	    CARDIAC MARKERS:                              9.3    7.2   )-----------( 223      ( 05 Jun 2018 13:11 )             28.0     06-05    140  |  103  |  36<H>  ----------------------------<  118<H>  4.5   |  25  |  0.82    Ca    9.1      05 Jun 2018 13:11    TPro  6.6  /  Alb  2.9<L>  /  TBili  0.2  /  DBili  x   /  AST  44<H>  /  ALT  35  /  AlkPhos  184<H>  06-05    proBNP:   Lipid Profile:   HgA1c:   TSH:   PT/INR - ( 05 Jun 2018 13:11 )   PT: 11.6 sec;   INR: 1.07 ratio         PTT - ( 05 Jun 2018 13:11 )  PTT:32.0 sec    PREVIOUS DIAGNOSTIC TESTING:    < from: Transthoracic Echocardiogram (04.21.18 @ 14:23) >  1. Mitral annular calcification, otherwise normal mitral  valve. Mild mitral regurgitation. Mean transmitral valve  gradient equals 4 mm Hg, consistent with mild mitral  stenosis. (HRabout 60s bpm)  2. Calcified trileaflet aortic valve with decreased  opening. Peak transaortic valve gradient equals 34 mm Hg,  mean transaortic valve gradient equals 19 mm Hg, estimated  aortic valve area equals 1.3 sqcm (by continuity equation),  aortic valve velocity time integral equals 59 cm,  consistent with moderate aortic stenosis. Aortic valve  assessment is limited by the presence of frequent PVCs and  the degree of aortic stenosis may be underestimated.  Visually, the aortic valve appears moderate-severely  stenotic. Consider additional imaging of the aortic valve  such as with additional TTE or TINO imaging if clinically  indicated.No aortic valve regurgitation seen.  3. Severely dilated left atrium.  LA volume index = 64  cc/m2.  4. Endocardium not well visualized; grossly normal left  ventricular systolic function. Septal motion consistent  with conduction defect. There is a false tendon in the LV  cavity (normal variant).  5. The right ventricle is not well visualized; right  ventricle appears enlarged with normal systolic function.

## 2018-06-05 NOTE — ED PROVIDER NOTE - OBJECTIVE STATEMENT
Patient presents with chief complaint of nasal bleeding and spitting up after presenting from ENT office. Patient has also been having black stools. Yesterday he saw ENT as well with removal of a pyogenic granuloma.  Patient has had dark stools x 1 day. Patient has been spitting up blood for > 1 month. No chest pain, shortness of breath. Patient has been fatigued.   Patient is on midodrine.  Patient normally walks with a walker, was able to walk yesterday but not today.  PT is on plavix and aspirin for coronary artery disease with stents.  primary medical doctor: Leif Santiago MD

## 2018-06-05 NOTE — ED PROVIDER NOTE - PROGRESS NOTE DETAILS
WATSON: discussed with Dr. Hinton will admit for continued bleeding, generalized weakness. Dr. Montgomery' office notified. Awaiting call back. WATSON: Discussed with Dr. Montgomery. Unclear source of bleeding. Will follow inpatient.

## 2018-06-05 NOTE — ED PROVIDER NOTE - PMH
Anxiety    Asthma    BCC (basal cell carcinoma)  skin  BPH (benign prostatic hyperplasia)    Cerebrovascular accident (CVA)    Chronic allergic rhinitis    Coronary artery disease  s/p 3 stents on June 30, 2017 at [place] Dr. Lavelle Reid  Esophagus cancer    Former smoker, stopped smoking in distant past    HTN (hypertension)    Hyperlipidemia    OA (osteoarthritis) of knee  right  Rheumatoid arthritis    Seizure disorder  1 episode approximately 15 yrs ago

## 2018-06-05 NOTE — CONSULT NOTE ADULT - SUBJECTIVE AND OBJECTIVE BOX
CC: hemoptysis    HPI: Patient is a 82y old  Male who we are asked to evaluate for hemoptysis. Pt with PMHx of CAD on plavix was seen with family at the bedside explaining that he was in out-pt ENT office yesterday with Dr. Montgomery for cauterization and removal of granuloma near posterior aspect of septal perf in the right nare and a dissolvable packing was placed b/l. He was advised to f/u if he had any bleeding, so this am he did so after coughing up blood. During office with Dr. Montgomery, a merocel packing was placed in right nare, however, pt became hypotensive and weak and was advised to go to hospital. He has had a few episodes of hemoptysis and feels like a lot of mucus and congestion prior to coughing it up. Pt denies feeling any sensation of PND, only a sore/uncomfortable throat which has been present for many months. Pt also admits to episode of black stool x1day.  Pt denies any fevers, chills, dysphagia, odynophagia, sob, changes in voice, recent nasal trauma or changes in facial sensation. Pt h/h is 9.3/28    PAST MEDICAL & SURGICAL HISTORY:  Coronary artery disease: s/p 3 stents on June 30, 2017 at [facility] Dr. Lavelle Reid  OA (osteoarthritis) of knee: right  Former smoker, stopped smoking in distant past  Rheumatoid arthritis  Seizure disorder: 1 episode approximately 15 yrs ago  Asthma  Hyperlipidemia  BPH (benign prostatic hyperplasia)  HTN (hypertension)  Esophagus cancer  Chronic allergic rhinitis  BCC (basal cell carcinoma): skin  Cerebrovascular accident (CVA)  Anxiety  Anastomotic leak following esophagectomy: [place] [facility]an esophagectomy  History of tonsillectomy  H/O heart artery stent: June 30, 2017  Knee arthropathy: left  History of total hip replacement: left  History of hernia repair    Allergies    penicillin (Rash)    Intolerances      MEDICATIONS  (STANDING):  amitriptyline 10 milliGRAM(s) Oral two times a day  aspirin enteric coated 81 milliGRAM(s) Oral daily  clopidogrel Tablet 75 milliGRAM(s) Oral daily  cyanocobalamin 500 MICROGram(s) Oral daily  finasteride 5 milliGRAM(s) Oral daily  fluticasone propionate 50 MICROgram(s)/spray Nasal Spray 1 Spray(s) Both Nostrils two times a day  folic acid 1 milliGRAM(s) Oral daily  levothyroxine 25 MICROGram(s) Oral daily  midodrine 10 milliGRAM(s) Oral every 8 hours  montelukast 10 milliGRAM(s) Oral daily  phenytoin   Capsule 200 milliGRAM(s) Oral two times a day  predniSONE   Tablet 5 milliGRAM(s) Oral daily  pyridoxine 100 milliGRAM(s) Oral daily  simvastatin 20 milliGRAM(s) Oral at bedtime  tamsulosin 0.4 milliGRAM(s) Oral at bedtime  tiotropium 18 MICROgram(s) Capsule 1 Capsule(s) Inhalation daily    MEDICATIONS  (PRN):  ALBUTerol    90 MICROgram(s) HFA Inhaler 1 Puff(s) Inhalation two times a day PRN Bronchospasm  ALPRAZolam 0.5 milliGRAM(s) Oral two times a day PRN anxiety  sodium chloride 0.65% Nasal 1 Spray(s) Both Nostrils three times a day PRN Nasal Congestion      Social History: former smoker    ROS: ENT, GI, , CV, Pulm, Neuro, Psych, MS, Heme, Endo, Constitutional; all negative except as noted in HPI    Vital Signs Last 24 Hrs  T(C): 36.3 (05 Jun 2018 16:49), Max: 36.4 (05 Jun 2018 15:35)  T(F): 97.4 (05 Jun 2018 16:49), Max: 97.5 (05 Jun 2018 15:35)  HR: 81 (05 Jun 2018 16:49) (65 - 81)  BP: 90/46 (05 Jun 2018 16:49) (89/51 - 108/61)  BP(mean): --  RR: 20 (05 Jun 2018 16:49) (18 - 20)  SpO2: 97% (05 Jun 2018 16:49) (97% - 99%)                          9.3    7.2   )-----------( 223      ( 05 Jun 2018 13:11 )             28.0    06-05    140  |  103  |  36<H>  ----------------------------<  118<H>  4.5   |  25  |  0.82    Ca    9.1      05 Jun 2018 13:11    TPro  6.6  /  Alb  2.9<L>  /  TBili  0.2  /  DBili  x   /  AST  44<H>  /  ALT  35  /  AlkPhos  184<H>  06-05   PT/INR - ( 05 Jun 2018 13:11 )   PT: 11.6 sec;   INR: 1.07 ratio         PTT - ( 05 Jun 2018 13:11 )  PTT:32.0 sec    PHYSICAL EXAM:  Gen: NAD, well-developed  Head: Normocephalic, Atraumatic  Face: no edema/erythema/fluctuance  Eyes: no scleral injection  Nose: right nare with merocel packing in place, no active bleeding, no discharge  Mouth: No Stridor / Drooling / Trismus.  Mucosa moist, tongue/uvula midline, oropharynx clear, no blood in posterior pharynx   Neck: Flat, supple, no lymphadenopathy, trachea midline, no masses  Resp: breathing easily, no stridor  CV: no peripheral edema/cyanosis    Fiberoptic Indirect laryngoscopy:  (With Scope #2) pending

## 2018-06-05 NOTE — ED ADULT NURSE NOTE - NS ED NURSE REPORT GIVEN TO FT
Report given to nurse Sridevi [id number]. Understands pmh, medications given and plan of care for patient. Patient in stable condition, vital signs updated, has no complaints at this time and has been updated on care plan. Explained to patient that it is change of shift and new nurse is taking over, pt verbalized understanding.

## 2018-06-05 NOTE — CHART NOTE - NSCHARTNOTEFT_GEN_A_CORE
[id number]  MEJIA LATIF    Notified by RN patient with wound on right antecubital fossa.     Patient seen and examined.      Plan of Care/ Interventions:        Rosana Schaeffer PA-C  Dept of Medicine [phone]  MEJIA LATIF    Notified by RN patient with wound on right antecubital fossa.   Per patient, wound was present during his last admission and was cauterized for bleeding and dressed.   Patient denies pain from area, bleeding, or oozing.   Patient states the wound has improved in appearance over the last few days and is no longer oozing.   Per patient, there was an IV that was placed near this area during the last admission    Patient seen and examined.   Physical exam:  General: Thin male, NAD, A&O x4  Skin: (+) well-demarcated oval-shaped wound on right antecubital fossa (approximately 3cm x1cm) with raised erythematous borders. Evidence of healing in the center of the wound. No bleed or exudates. Radial pulse intact. FROM RUE.    Wound Plan of Care/ Interventions:  Bacitracin to area TID  Wound consult   Consider dermatology consult   No evidence of cellulitis on exam, however consider antibiotics if redness/warmth/pain of area develops   Consider Doppler of RUE to r/o DVT   Continue to monitor and observe patient  Will endorse to primary team in CINDY Schaeffer PA-C  Dept of Medicine  [phone]  MEJIA LATIF    Notified by RN patient with wound on right antecubital fossa.   Per patient, wound was present during his last admission and was cauterized for bleeding and dressed.   Patient denies pain from area, bleeding, or oozing.   Patient states the wound has improved in appearance over the last few days and is no longer oozing.   Per patient, there was an IV that was placed near this area during the last admission    Patient seen and examined.   Physical exam:  General: Thin male, NAD, A&O x4  Skin: (+) well-demarcated oval-shaped wound on right antecubital fossa (approximately 3cm x1cm) with raised erythematous borders. Evidence of healing in the center of the wound. No bleed or exudates. Radial pulse intact. FROM RUE.    Wound Plan of Care/ Interventions:  Bacitracin to area   Consider wound consult   Consider dermatology consult   No evidence of cellulitis on exam or evidence of leukocytosis on CBC, however consider antibiotics if redness/warmth/pain of area develops   Consider Doppler of RUE to r/o DVT   Continue to monitor and observe patient  Will endorse to primary team in AM        Rosana Schaeffer PA-C  Dept of Medicine  [phone]

## 2018-06-05 NOTE — H&P ADULT - ASSESSMENT
pt with hx of cad, s/p stent on asa and plavix with recurrent episodes of epistaxis requiring transfusion.  ENT evaluation  continue asa and plavix  continue on midodrine will increase dose as needed

## 2018-06-05 NOTE — ED PROVIDER NOTE - PHYSICAL EXAMINATION
right nare packed post outpatient resection  GEN: moderate general fatigue, awake, eyes open spontaneously  HEENT: NCAT, MMM, Trachea midline, mildly pale conjunctiva, perrl  CHEST/LUNGS: Non-tachypneic, CTAB, bilateral breath sounds  CARDIAC: Non-tachycardic, normal perfusion, 2/6 left apical celena  ABDOMEN: Soft, NTND, No rebound/guarding  : no sacral decubiti, stage 1 in cleft of buttock, guaiac + dark brown stool  MSK: No edema, no gross deformity of extremities  SKIN: No rashes, no petechiae, no vesicles  NEURO: CN grossly intact, normal coordination, no focal motor or sensory deficits  PSYCH: Alert, appropriate, cooperative, with capacity and insight

## 2018-06-05 NOTE — ED ADULT NURSE NOTE - OBJECTIVE STATEMENT
81 y/o male presents to ed c/o bleeding from his nose. Pt states he saw his ent and has had a bloody nose and blood in his stool. C/o weakness. Denies chest pain, sob, ha, n/v/d, abdominal pain, f/c, urinary symptoms, hematuria. A&Ox4, vss, skin tear to left forearm and stage two pressure sacrum, MAEx4, lungs CTA, abd soft nondistended. Pt resting comfortably with VSS, no complaints at this time. Patient's bed in the lowest position, explained plan of care to patient and family members. Will continue to reassess.

## 2018-06-06 DIAGNOSIS — F41.8 OTHER SPECIFIED ANXIETY DISORDERS: ICD-10-CM

## 2018-06-06 DIAGNOSIS — F43.21 ADJUSTMENT DISORDER WITH DEPRESSED MOOD: ICD-10-CM

## 2018-06-06 LAB
ANION GAP SERPL CALC-SCNC: 9 MMOL/L — SIGNIFICANT CHANGE UP (ref 5–17)
ANION GAP SERPL CALC-SCNC: 9 MMOL/L — SIGNIFICANT CHANGE UP (ref 5–17)
BUN SERPL-MCNC: 32 MG/DL — HIGH (ref 7–23)
BUN SERPL-MCNC: 34 MG/DL — HIGH (ref 7–23)
CALCIUM SERPL-MCNC: 8.4 MG/DL — SIGNIFICANT CHANGE UP (ref 8.4–10.5)
CALCIUM SERPL-MCNC: 8.7 MG/DL — SIGNIFICANT CHANGE UP (ref 8.4–10.5)
CHLORIDE SERPL-SCNC: 104 MMOL/L — SIGNIFICANT CHANGE UP (ref 96–108)
CHLORIDE SERPL-SCNC: 105 MMOL/L — SIGNIFICANT CHANGE UP (ref 96–108)
CK MB CFR SERPL CALC: 10.2 NG/ML — HIGH (ref 0–6.7)
CK SERPL-CCNC: 79 U/L — SIGNIFICANT CHANGE UP (ref 30–200)
CO2 SERPL-SCNC: 26 MMOL/L — SIGNIFICANT CHANGE UP (ref 22–31)
CO2 SERPL-SCNC: 27 MMOL/L — SIGNIFICANT CHANGE UP (ref 22–31)
CREAT SERPL-MCNC: 0.65 MG/DL — SIGNIFICANT CHANGE UP (ref 0.5–1.3)
CREAT SERPL-MCNC: 0.66 MG/DL — SIGNIFICANT CHANGE UP (ref 0.5–1.3)
GLUCOSE SERPL-MCNC: 100 MG/DL — HIGH (ref 70–99)
GLUCOSE SERPL-MCNC: 93 MG/DL — SIGNIFICANT CHANGE UP (ref 70–99)
HCT VFR BLD CALC: 20 % — CRITICAL LOW (ref 39–50)
HCT VFR BLD CALC: 21.1 % — LOW (ref 39–50)
HCT VFR BLD CALC: 25.9 % — LOW (ref 39–50)
HGB BLD-MCNC: 6.6 G/DL — CRITICAL LOW (ref 13–17)
HGB BLD-MCNC: 7.1 G/DL — LOW (ref 13–17)
HGB BLD-MCNC: 8.8 G/DL — LOW (ref 13–17)
MAGNESIUM SERPL-MCNC: 1.8 MG/DL — SIGNIFICANT CHANGE UP (ref 1.6–2.6)
MCHC RBC-ENTMCNC: 30.4 PG — SIGNIFICANT CHANGE UP (ref 27–34)
MCHC RBC-ENTMCNC: 30.8 PG — SIGNIFICANT CHANGE UP (ref 27–34)
MCHC RBC-ENTMCNC: 31.9 PG — SIGNIFICANT CHANGE UP (ref 27–34)
MCHC RBC-ENTMCNC: 33 GM/DL — SIGNIFICANT CHANGE UP (ref 32–36)
MCHC RBC-ENTMCNC: 33.7 GM/DL — SIGNIFICANT CHANGE UP (ref 32–36)
MCHC RBC-ENTMCNC: 34 GM/DL — SIGNIFICANT CHANGE UP (ref 32–36)
MCV RBC AUTO: 90.6 FL — SIGNIFICANT CHANGE UP (ref 80–100)
MCV RBC AUTO: 92.2 FL — SIGNIFICANT CHANGE UP (ref 80–100)
MCV RBC AUTO: 94.8 FL — SIGNIFICANT CHANGE UP (ref 80–100)
OB PNL STL: NEGATIVE — SIGNIFICANT CHANGE UP
PHENYTOIN FREE SERPL-MCNC: 13.8 UG/ML — SIGNIFICANT CHANGE UP (ref 10–20)
PHOSPHATE SERPL-MCNC: 2.7 MG/DL — SIGNIFICANT CHANGE UP (ref 2.5–4.5)
PLATELET # BLD AUTO: 183 K/UL — SIGNIFICANT CHANGE UP (ref 150–400)
PLATELET # BLD AUTO: 198 K/UL — SIGNIFICANT CHANGE UP (ref 150–400)
PLATELET # BLD AUTO: 199 K/UL — SIGNIFICANT CHANGE UP (ref 150–400)
POTASSIUM SERPL-MCNC: 3.9 MMOL/L — SIGNIFICANT CHANGE UP (ref 3.5–5.3)
POTASSIUM SERPL-MCNC: 3.9 MMOL/L — SIGNIFICANT CHANGE UP (ref 3.5–5.3)
POTASSIUM SERPL-SCNC: 3.9 MMOL/L — SIGNIFICANT CHANGE UP (ref 3.5–5.3)
POTASSIUM SERPL-SCNC: 3.9 MMOL/L — SIGNIFICANT CHANGE UP (ref 3.5–5.3)
RBC # BLD: 2.17 M/UL — LOW (ref 4.2–5.8)
RBC # BLD: 2.23 M/UL — LOW (ref 4.2–5.8)
RBC # BLD: 2.85 M/UL — LOW (ref 4.2–5.8)
RBC # FLD: 16.3 % — HIGH (ref 10.3–14.5)
RBC # FLD: 17.2 % — HIGH (ref 10.3–14.5)
RBC # FLD: 18.5 % — HIGH (ref 10.3–14.5)
SODIUM SERPL-SCNC: 140 MMOL/L — SIGNIFICANT CHANGE UP (ref 135–145)
SODIUM SERPL-SCNC: 140 MMOL/L — SIGNIFICANT CHANGE UP (ref 135–145)
TROPONIN T SERPL-MCNC: 0.77 NG/ML — HIGH (ref 0–0.06)
TSH SERPL-MCNC: 4.89 UIU/ML — HIGH (ref 0.27–4.2)
WBC # BLD: 4.43 K/UL — SIGNIFICANT CHANGE UP (ref 3.8–10.5)
WBC # BLD: 5.1 K/UL — SIGNIFICANT CHANGE UP (ref 3.8–10.5)
WBC # BLD: 5.5 K/UL — SIGNIFICANT CHANGE UP (ref 3.8–10.5)
WBC # FLD AUTO: 4.43 K/UL — SIGNIFICANT CHANGE UP (ref 3.8–10.5)
WBC # FLD AUTO: 5.1 K/UL — SIGNIFICANT CHANGE UP (ref 3.8–10.5)
WBC # FLD AUTO: 5.5 K/UL — SIGNIFICANT CHANGE UP (ref 3.8–10.5)

## 2018-06-06 PROCEDURE — 93010 ELECTROCARDIOGRAM REPORT: CPT | Mod: 77

## 2018-06-06 PROCEDURE — 93010 ELECTROCARDIOGRAM REPORT: CPT

## 2018-06-06 PROCEDURE — 99222 1ST HOSP IP/OBS MODERATE 55: CPT

## 2018-06-06 PROCEDURE — 99221 1ST HOSP IP/OBS SF/LOW 40: CPT

## 2018-06-06 PROCEDURE — 99232 SBSQ HOSP IP/OBS MODERATE 35: CPT

## 2018-06-06 RX ORDER — POTASSIUM PHOSPHATE, MONOBASIC POTASSIUM PHOSPHATE, DIBASIC 236; 224 MG/ML; MG/ML
15 INJECTION, SOLUTION INTRAVENOUS ONCE
Qty: 0 | Refills: 0 | Status: COMPLETED | OUTPATIENT
Start: 2018-06-06 | End: 2018-06-06

## 2018-06-06 RX ORDER — PANTOPRAZOLE SODIUM 20 MG/1
40 TABLET, DELAYED RELEASE ORAL ONCE
Qty: 0 | Refills: 0 | Status: COMPLETED | OUTPATIENT
Start: 2018-06-06 | End: 2018-06-06

## 2018-06-06 RX ORDER — ALPRAZOLAM 0.25 MG
0.5 TABLET ORAL
Qty: 0 | Refills: 0 | Status: DISCONTINUED | OUTPATIENT
Start: 2018-06-06 | End: 2018-06-08

## 2018-06-06 RX ORDER — MAGNESIUM SULFATE 500 MG/ML
1 VIAL (ML) INJECTION ONCE
Qty: 0 | Refills: 0 | Status: COMPLETED | OUTPATIENT
Start: 2018-06-06 | End: 2018-06-06

## 2018-06-06 RX ORDER — SODIUM CHLORIDE 0.65 %
1 AEROSOL, SPRAY (ML) NASAL DAILY
Qty: 0 | Refills: 0 | Status: DISCONTINUED | OUTPATIENT
Start: 2018-06-06 | End: 2018-06-08

## 2018-06-06 RX ORDER — SUCRALFATE 1 G
1 TABLET ORAL
Qty: 0 | Refills: 0 | Status: DISCONTINUED | OUTPATIENT
Start: 2018-06-06 | End: 2018-06-08

## 2018-06-06 RX ADMIN — Medication 0.5 MILLIGRAM(S): at 18:17

## 2018-06-06 RX ADMIN — Medication 1 GRAM(S): at 18:18

## 2018-06-06 RX ADMIN — MONTELUKAST 10 MILLIGRAM(S): 4 TABLET, CHEWABLE ORAL at 11:32

## 2018-06-06 RX ADMIN — MIDODRINE HYDROCHLORIDE 10 MILLIGRAM(S): 2.5 TABLET ORAL at 00:48

## 2018-06-06 RX ADMIN — Medication 1 SPRAY(S): at 18:18

## 2018-06-06 RX ADMIN — Medication 200 MILLIGRAM(S): at 18:18

## 2018-06-06 RX ADMIN — Medication 1 APPLICATION(S): at 13:17

## 2018-06-06 RX ADMIN — Medication 100 GRAM(S): at 04:22

## 2018-06-06 RX ADMIN — MIDODRINE HYDROCHLORIDE 10 MILLIGRAM(S): 2.5 TABLET ORAL at 21:05

## 2018-06-06 RX ADMIN — PREGABALIN 500 MICROGRAM(S): 225 CAPSULE ORAL at 11:33

## 2018-06-06 RX ADMIN — Medication 10 MILLIGRAM(S): at 05:48

## 2018-06-06 RX ADMIN — FINASTERIDE 5 MILLIGRAM(S): 5 TABLET, FILM COATED ORAL at 11:32

## 2018-06-06 RX ADMIN — TAMSULOSIN HYDROCHLORIDE 0.4 MILLIGRAM(S): 0.4 CAPSULE ORAL at 21:05

## 2018-06-06 RX ADMIN — Medication 10 MILLIGRAM(S): at 18:18

## 2018-06-06 RX ADMIN — Medication 25 MICROGRAM(S): at 05:08

## 2018-06-06 RX ADMIN — Medication 200 MILLIGRAM(S): at 05:08

## 2018-06-06 RX ADMIN — MIDODRINE HYDROCHLORIDE 10 MILLIGRAM(S): 2.5 TABLET ORAL at 13:17

## 2018-06-06 RX ADMIN — SIMVASTATIN 20 MILLIGRAM(S): 20 TABLET, FILM COATED ORAL at 21:05

## 2018-06-06 RX ADMIN — Medication 1 MILLIGRAM(S): at 11:32

## 2018-06-06 RX ADMIN — POTASSIUM PHOSPHATE, MONOBASIC POTASSIUM PHOSPHATE, DIBASIC 62.5 MILLIMOLE(S): 236; 224 INJECTION, SOLUTION INTRAVENOUS at 05:08

## 2018-06-06 RX ADMIN — Medication 5 MILLIGRAM(S): at 05:08

## 2018-06-06 RX ADMIN — CLOPIDOGREL BISULFATE 75 MILLIGRAM(S): 75 TABLET, FILM COATED ORAL at 18:18

## 2018-06-06 RX ADMIN — PANTOPRAZOLE SODIUM 40 MILLIGRAM(S): 20 TABLET, DELAYED RELEASE ORAL at 11:33

## 2018-06-06 RX ADMIN — Medication 81 MILLIGRAM(S): at 18:18

## 2018-06-06 RX ADMIN — Medication 1 APPLICATION(S): at 05:09

## 2018-06-06 RX ADMIN — TIOTROPIUM BROMIDE 1 CAPSULE(S): 18 CAPSULE ORAL; RESPIRATORY (INHALATION) at 11:33

## 2018-06-06 RX ADMIN — Medication 100 MILLIGRAM(S): at 11:33

## 2018-06-06 RX ADMIN — Medication 1 GRAM(S): at 21:05

## 2018-06-06 RX ADMIN — Medication 1 TABLET(S): at 11:32

## 2018-06-06 NOTE — BEHAVIORAL HEALTH ASSESSMENT NOTE - NSBHCHARTREVIEWVS_PSY_A_CORE FT
Vital Signs Last 24 Hrs  T(C): 36.4 (06 Jun 2018 11:00), Max: 36.4 (05 Jun 2018 21:43)  T(F): 97.5 (06 Jun 2018 11:00), Max: 97.6 (06 Jun 2018 05:00)  HR: 79 (06 Jun 2018 11:00) (72 - 81)  BP: 88/45 (06 Jun 2018 11:00) (88/45 - 104/56)  BP(mean): --  RR: 16 (06 Jun 2018 11:00) (16 - 20)  SpO2: 93% (06 Jun 2018 11:00) (93% - 97%)

## 2018-06-06 NOTE — BEHAVIORAL HEALTH ASSESSMENT NOTE - DIFFERENTIAL
adjustment disorder with depressed mood   depression, not otherwise specified   fatigue and exhaustion due to a medical condition   delirium

## 2018-06-06 NOTE — DIETITIAN INITIAL EVALUATION ADULT. - ORAL INTAKE PTA
fair/Pt reports fair po intake PTA. Typical intake includes: Breakfast: cream cheese omelet. Lunch: Soup, ham, egg salad, left overs. Dinner: Meat, potato, soup, vegetables. Drinks water, decaf tea and Ensure Enlive 1-2x daily strawberry flavor only. Denies food allergies/intolerance. takes cyanocobalamin, vitamin B-6, folic acid supplements PTA.

## 2018-06-06 NOTE — DIETITIAN INITIAL EVALUATION ADULT. - FACTORS AFF FOOD INTAKE
difficulty swallowing/Pt with hx of esophageal CA s/p chemo/RT and esophagogastrectomy; reports intermittent difficulty with swallowing

## 2018-06-06 NOTE — PROGRESS NOTE ADULT - SUBJECTIVE AND OBJECTIVE BOX
HPI: Patient is a 82y old Male with PMHx of CAD on plavix who presented to the ED on 6/5/18 with hemoptysis and continues to have episodes of hemoptysis at this time, his last episode being this morning.  Pt was seen in out-pt ENT office 2 days ago with Dr. Montgomery for cauterization and removal of granuloma near posterior aspect of septal perf in the right nare and a dissolvable packing was placed b/l. He was advised to f/u if he had any bleeding, which he is experiencing at this time. During office with Dr. Montgomery yesterday, a merocel packing was placed in right nare, however, pt became hypotensive and weak and was advised to go to hospital. Pt denies any fevers, chills, dysphagia, odynophagia, sob, changes in voice, epistaxis overnight, recent nasal trauma or changes in facial sensation. Patient's blood pressure is controlled.    Vital Signs Last 24 Hrs  T(C): 36.4 (06 Jun 2018 05:00), Max: 36.4 (05 Jun 2018 15:35)  T(F): 97.6 (06 Jun 2018 05:00), Max: 97.6 (06 Jun 2018 05:00)  HR: 72 (06 Jun 2018 07:24) (65 - 81)  BP: 102/52 (06 Jun 2018 07:24) (88/45 - 108/61)  BP(mean): --  RR: 18 (06 Jun 2018 05:00) (18 - 20)  SpO2: 94% (06 Jun 2018 05:00) (94% - 99%)    PHYSICAL EXAM:  Gen: NAD, well-developed  Head: Normocephalic, Atraumatic  Eyes: no scleral injection  Nose: Right nare with nasal Merocel packing and no active bleeding.  Left nare dry, patent, with no active bleeding.  Mouth: Mucosa moist, tongue/uvula midline, oropharynx clear with no active bleeding or blood clot visualized  Neck: Flat, supple, no lymphadenopathy, trachea midline, no masses  Resp: no stridor  CV: no peripheral edema/cyanosis    LABS:                        9.3    7.2   )-----------( 223      ( 05 Jun 2018 13:11 )             28.0

## 2018-06-06 NOTE — DIETITIAN INITIAL EVALUATION ADULT. - NS AS NUTRI INTERV MEALS SNACK
Continue regular liberalized diet to promote optimal po intake. Pt declined downgrade of diet consistency for ease of chewing/swallowing. Pt's food preferences obtained and honored on menu.

## 2018-06-06 NOTE — CHART NOTE - NSCHARTNOTEFT_GEN_A_CORE
had a discussion with family and wife they do no want any aggressive measures pt is not a candidate for egd as per gi.  medical therapy  transfuse as needed  pt wants to go home.

## 2018-06-06 NOTE — BEHAVIORAL HEALTH ASSESSMENT NOTE - NSBHVIOLPROTECT_PSY_A_CORE
Engagement in treatment/Affective stability/Relationship stability/Employment stability/Good treatment reponse/ compliance/Sobriety/Residential stability

## 2018-06-06 NOTE — BEHAVIORAL HEALTH ASSESSMENT NOTE - SUMMARY
Psychiatry recommendation is to consider having a neurology consult to evaluate the need for anticonvulsant therapy.  Consider switching the pt’s anticonvulsant medication phenytoin to a different anticonvulsant medication with less side effects and drug-drug interactions. Overall pt does not present with symptoms of delirium, and delirium would be unlikely based on presentation. Pt's current symptoms of anxiety and depression appear to be situational, possibly attributed to adjustment disorder or from exhaustion due to his medical condition.  If possible, pt should be restarted on his Xanax regiment for anxiety treatment. This is an 82-y.o. CM pt. with multiple medical problems and hx. of anxiety, evaluated for depression in the context of repeated, multiple medical admissions and protracted blood loss with no identifiable source. At present time, patient does not meet the criteria for depression.  Pt's current symptoms of anxiety and depression appear to be situational, possibly attributed to adjustment disorder or from exhaustion due to his medical condition.  If possible, pt should be restarted on his Xanax regiment for anxiety treatment.

## 2018-06-06 NOTE — BEHAVIORAL HEALTH ASSESSMENT NOTE - NSBHCHARTREVIEWINVESTIGATE_PSY_A_CORE FT
< from: 12 Lead ECG (06.05.18 @ 13:52) >      Ventricular Rate 77 BPM    Atrial Rate 77 BPM    P-R Interval 248 ms    QRS Duration 140 ms     ms    QTc 497 ms    P Axis 69 degrees    R Axis -79 degrees    T Axis 64 degrees    Diagnosis Line SINUS RHYTHM WITH 1ST DEGREE A-V BLOCK  RIGHT BUNDLE BRANCH BLOCK  LEFT ANTERIOR FASCICULAR BLOCK  *** BIFASCICULAR BLOCK ***  ABNORMAL ECG    < end of copied text >

## 2018-06-06 NOTE — BEHAVIORAL HEALTH ASSESSMENT NOTE - NSBHSUICPROTECTFACT_PSY_A_CORE
Identifies reasons for living/Supportive social network or family/Engaged in work or school/Responsibility to family and others/Positive therapeutic relationships/Future oriented

## 2018-06-06 NOTE — PROGRESS NOTE ADULT - SUBJECTIVE AND OBJECTIVE BOX
- Patient seen and examined.  - In summary, patient is a 82 year old man who presented with hemoptysis (31 May 2018 14:01)  - Today, patient is without complaints.         *****MEDICATIONS:    MEDICATIONS  (STANDING):  amitriptyline 10 milliGRAM(s) Oral two times a day  aspirin enteric coated 81 milliGRAM(s) Oral daily  BACItracin   Ointment 1 Application(s) Topical three times a day  clopidogrel Tablet 75 milliGRAM(s) Oral daily  cyanocobalamin 500 MICROGram(s) Oral daily  finasteride 5 milliGRAM(s) Oral daily  fluticasone propionate 50 MICROgram(s)/spray Nasal Spray 1 Spray(s) Both Nostrils two times a day  folic acid 1 milliGRAM(s) Oral daily  levothyroxine 25 MICROGram(s) Oral daily  midodrine 10 milliGRAM(s) Oral every 8 hours  montelukast 10 milliGRAM(s) Oral daily  phenytoin   Capsule 200 milliGRAM(s) Oral two times a day  predniSONE   Tablet 5 milliGRAM(s) Oral daily  pyridoxine 100 milliGRAM(s) Oral daily  simvastatin 20 milliGRAM(s) Oral at bedtime  tamsulosin 0.4 milliGRAM(s) Oral at bedtime  tiotropium 18 MICROgram(s) Capsule 1 Capsule(s) Inhalation daily  trimethoprim  160 mG/sulfamethoxazole 800 mG 1 Tablet(s) Oral daily    MEDICATIONS  (PRN):  ALBUTerol    90 MICROgram(s) HFA Inhaler 1 Puff(s) Inhalation two times a day PRN Bronchospasm  ALPRAZolam 0.5 milliGRAM(s) Oral two times a day PRN anxiety  sodium chloride 0.65% Nasal 1 Spray(s) Both Nostrils three times a day PRN Nasal Congestion                 ***** REVIEW OF SYSTEM:  GEN: no fever, no chills, no pain  RESP: no SOB, no cough, no sputum  CVS: no chest pain, no palpitations, no edema  GI: no abdominal pain, no nausea, no vomiting, no constipation, no diarrhea  : no dysuria, no frequency  NEURO: no headache, no dizziness  PSYCH: no depression, not anxious  Derm : no itching, no rash         ***** VITAL SIGNS:    T(F): 97.6 (18 @ 05:00), Max: 97.6 (18 @ 05:00)  HR: 72 (18 @ 07:24) (65 - 81)  BP: 102/52 (18 @ 07:24) (88/45 - 108/61)  RR: 18 (18 @ 05:00) (18 - 20)  SpO2: 94% (18 @ 05:00) (94% - 99%)  Wt(kg): --  ,   I&O's Summary    2018 07:01  -  2018 07:00  --------------------------------------------------------  IN: 120 mL / OUT: 300 mL / NET: -180 mL                   *****PHYSICAL EXAM:  GEN: A&O X 3 , NAD , comfortable  HEENT: NCAT, EOMI, MMM, no icterus  NECK: Supple, No JVD  CVS: S1S2 , regular , No M/R/G appreciated  PULM: CTA B/L,  no W/R/R appreciated  ABD.: soft. non tender, non distended,  bowel sounds present  Extrem: intact pulses , no edema noted  Derm: No rash or ecchymosis noted  PSYCH: normal mood, no depression, not anxious         *****LAB AND IMAGIN.3    7.2   )-----------( 223      ( 2018 13:11 )             28.0               06-    140  |  104  |  32<H>  ----------------------------<  93  3.9   |  27  |  0.65    Ca    8.7      2018 06:29  Phos  2.7     -  Mg     1.8     -    TPro  6.6  /  Alb  2.9<L>  /  TBili  0.2  /  DBili  x   /  AST  44<H>  /  ALT  35  /  AlkPhos  184<H>  06-    PT/INR - ( 2018 13:11 )   PT: 11.6 sec;   INR: 1.07 ratio         PTT - ( 2018 13:11 )  PTT:32.0 sec                 [All pertinent recent Imaging/Reports reviewed]         *****A S S E S S M E N T   A N D   P L A N :  82M  with CAD s/p recent stents orthostatic hypotension who presented with recurrent hemoptyses  ENT f/u noted  prior pci  continue asa and plavix  continue on midodrine will increase dose as needed  monitor H/H    __________________________  KAYLA. ROXIE Brady

## 2018-06-06 NOTE — BEHAVIORAL HEALTH ASSESSMENT NOTE - HPI (INCLUDE ILLNESS QUALITY, SEVERITY, DURATION, TIMING, CONTEXT, MODIFYING FACTORS, ASSOCIATED SIGNS AND SYMPTOMS)
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 PMHx of anxiety, asthma, BCC, BPH, CVA, chronic allergic rhinitis, coronary artery disease s/p 3 stents on June 30, 2017 done at [facility] with Dr. Lavelle Reid, esophageal cancer, former smoker stopped 50 years ago, HTN, HLD, OA of the right knee, RA, hypothyroidism, and seizure disorder 1 episode approximately 15 yrs ago. PPHx anxiety currently in treatment w/ outpatient psychiatrist, Dr. Colten Gibbs in [facility] and being treated with Xanax 0.5mg BID with good effect; however, pt has not been able to take this medication while in the hospital as he has been NPO. Pt’s ISTOP # is [id number]. Pt has no hx of past psychiatric hospitalizations, AVT hallucinations, ann, psychosis, SI/HI or SA/HA. Psych consult requested by the medical team to assess the patient for depression.     On encounter, collateral was gathered from both the pt’s wife and daughter who were there visiting him. The pt’s wife is his healthcare proxy and states that while she “wants the pt to have another transfusion and live,” she recognizes that it is the pt’s decision to consent for treatment. After this, the pt awoke from his nap and was AAOX3, NAD, the pt states that he is frustrated that there is no coordination of care, because his doctors are not speaking to each other.  The pt states that he feels better now that his gastroenterologist and cardiologist are working together to coordinate care. The pt’s wife states that the pt “is cognitively with it.” Pt was able to spell WORLD forwards and backwards, follow simple commands, answer questions and engage in conversation. Pt admits to having anxiety and depression because of his current situation, otherwise the pt does not usually experience depression. Pt states he has been under the care of an outpatient psychiatrist, Dr. Colten Gibbs for many years and his anxiety is currently managed with 0.5mg Xanax BID; pt usually sees his psychiatrist monthly. Pt was counseled on the long term side effects of benzodiazepine use, such as memory loss. Pt understands this to be a side effect of the medication and would rather not be anxious and states "I don't mind the memory loss." Pt states he does not take Elavil and that he has never taken Elavil. Pt states he does not sleep or eat well in the hospital, but when he is home he sleeps well and has a good appetite.     Pt endorses anxiety and depression due to his current situation, however, pt is optimistic now that his doctors are coordinating his care.  Overall pt's loss of energy can be attributed to his blood loss of unknown origin. Pt denies ann, AVT hallucinations, paranoia, SI/HI, SA/HI. Pt denies all recreational substance use.

## 2018-06-06 NOTE — DIETITIAN INITIAL EVALUATION ADULT. - NS AS NUTRI INTERV COLLABORAT
ENT + GI following. Consider swallowing re-eval for dysphagia as patient amenable; pt currently expresses desire to remain on regular consistency diet despite c/o intermittent dysphagia.

## 2018-06-06 NOTE — BEHAVIORAL HEALTH ASSESSMENT NOTE - NSBHCONSULTMEDS_PSY_A_CORE FT
Continue Xanax 0.5mg p.o. BID standing (confirmed via I-STOP)  Consider melatonin 3mg p.o. at bedtime

## 2018-06-06 NOTE — DIETITIAN INITIAL EVALUATION ADULT. - ADHERENCE
n/a/Pt does not follow a particular modified diet PTA. Tries to optimize kcal/nutrient intake with supplemental Ensure 1-2 x daily.

## 2018-06-06 NOTE — CHART NOTE - NSCHARTNOTEFT_GEN_A_CORE
Upon Nutritional Assessment by the Registered Dietitian your patient was determined to meet criteria / has evidence of the following diagnosis/diagnoses:          [ ]  Mild Protein Calorie Malnutrition        [ ]  Moderate Protein Calorie Malnutrition        [x] Severe Protein Calorie Malnutrition        [ ] Unspecified Protein Calorie Malnutrition        [ ] Underweight / BMI <19        [ ] Morbid Obesity / BMI > 40      Findings as based on:  [x] Comprehensive nutrition assessment   [ ] Nutrition Focused Physical Exam  [x] Other: 43 pound wt loss x 9 months      Nutrition Plan/Recommendations:        Please see RD initial assessment for recommendations and interventions      PROVIDER Section:     By signing this assessment you are acknowledging and agree with the diagnosis/diagnoses assigned by the Registered Dietitian    Comments:

## 2018-06-06 NOTE — BEHAVIORAL HEALTH ASSESSMENT NOTE - NSBHREFERDETAILS_PSY_A_CORE_FT
Pt has had multiple admissions and medical team would like psychiatry to evaluate pt for depression. Pt has had multiple medical admissions and medical team would like psychiatry to evaluate pt for depression.

## 2018-06-06 NOTE — BEHAVIORAL HEALTH ASSESSMENT NOTE - DESCRIPTION
asthma, BCC, BPH, CVA, chronic allergic rhinitis, coronary artery disease s/p 3 stents, esophageal cancer, HTN, HLD, OA of the right knee, RA, hypothyroidism, and seizure disorder

## 2018-06-06 NOTE — PROGRESS NOTE ADULT - SUBJECTIVE AND OBJECTIVE BOX
MEJIA MCCORDBA:[id number],   82yMale followed for:  penicillin (Rash)    PAST MEDICAL & SURGICAL HISTORY:  Coronary artery disease: s/p 3 stents on June 30, 2017 at [facility] Dr. Lavelle Reid  OA (osteoarthritis) of knee: right  Former smoker, stopped smoking in distant past  Rheumatoid arthritis  Seizure disorder: 1 episode approximately 15 yrs ago  Asthma  Hyperlipidemia  BPH (benign prostatic hyperplasia)  HTN (hypertension)  Esophagus cancer  Chronic allergic rhinitis  BCC (basal cell carcinoma): skin  Cerebrovascular accident (CVA)  Anxiety  Anastomotic leak following esophagectomy: Shokan logan esophagectomy  History of tonsillectomy  H/O heart artery stent: June 30, 2017  Knee arthropathy: left  History of total hip replacement: left  History of hernia repair    FAMILY HISTORY:  Family history of heart attack  Family history of pulmonary embolism: father @ 49 yr old  FH: CAD (coronary artery disease) (Sibling)    MEDICATIONS  (STANDING):  amitriptyline 10 milliGRAM(s) Oral two times a day  aspirin enteric coated 81 milliGRAM(s) Oral daily  BACItracin   Ointment 1 Application(s) Topical three times a day  clopidogrel Tablet 75 milliGRAM(s) Oral daily  cyanocobalamin 500 MICROGram(s) Oral daily  finasteride 5 milliGRAM(s) Oral daily  fluticasone propionate 50 MICROgram(s)/spray Nasal Spray 1 Spray(s) Both Nostrils two times a day  folic acid 1 milliGRAM(s) Oral daily  levothyroxine 25 MICROGram(s) Oral daily  midodrine 10 milliGRAM(s) Oral every 8 hours  montelukast 10 milliGRAM(s) Oral daily  phenytoin   Capsule 200 milliGRAM(s) Oral two times a day  predniSONE   Tablet 5 milliGRAM(s) Oral daily  pyridoxine 100 milliGRAM(s) Oral daily  simvastatin 20 milliGRAM(s) Oral at bedtime  tamsulosin 0.4 milliGRAM(s) Oral at bedtime  tiotropium 18 MICROgram(s) Capsule 1 Capsule(s) Inhalation daily  trimethoprim  160 mG/sulfamethoxazole 800 mG 1 Tablet(s) Oral daily    MEDICATIONS  (PRN):  ALBUTerol    90 MICROgram(s) HFA Inhaler 1 Puff(s) Inhalation two times a day PRN Bronchospasm  ALPRAZolam 0.5 milliGRAM(s) Oral two times a day PRN anxiety  sodium chloride 0.65% Nasal 1 Spray(s) Both Nostrils three times a day PRN Nasal Congestion      Vital Signs Last 24 Hrs  T(C): 36.4 (06 Jun 2018 05:00), Max: 36.4 (05 Jun 2018 15:35)  T(F): 97.6 (06 Jun 2018 05:00), Max: 97.6 (06 Jun 2018 05:00)  HR: 77 (06 Jun 2018 09:51) (65 - 81)  BP: 94/51 (06 Jun 2018 09:51) (88/45 - 108/61)  BP(mean): --  RR: 18 (06 Jun 2018 05:00) (18 - 20)  SpO2: 94% (06 Jun 2018 05:00) (94% - 99%)  nc/at  s1s2  cta  soft, nt, nd no guarding or rebound  no c/c/e    CBC Full  -  ( 05 Jun 2018 13:11 )  WBC Count : 7.2 K/uL  Hemoglobin : 9.3 g/dL  Hematocrit : 28.0 %  Platelet Count - Automated : 223 K/uL  Mean Cell Volume : 94.5 fl  Mean Cell Hemoglobin : 31.4 pg  Mean Cell Hemoglobin Concentration : 33.3 gm/dL  Auto Neutrophil # : 6.2 K/uL  Auto Lymphocyte # : 0.5 K/uL  Auto Monocyte # : 0.4 K/uL  Auto Eosinophil # : 0.1 K/uL  Auto Basophil # : 0.0 K/uL  Auto Neutrophil % : 85.3 %  Auto Lymphocyte % : 7.1 %  Auto Monocyte % : 5.7 %  Auto Eosinophil % : 1.6 %  Auto Basophil % : 0.4 %    06-06    140  |  104  |  32<H>  ----------------------------<  93  3.9   |  27  |  0.65    Ca    8.7      06 Jun 2018 06:29  Phos  2.7     06-06  Mg     1.8     06-06    TPro  6.6  /  Alb  2.9<L>  /  TBili  0.2  /  DBili  x   /  AST  44<H>  /  ALT  35  /  AlkPhos  184<H>  06-05    PT/INR - ( 05 Jun 2018 13:11 )   PT: 11.6 sec;   INR: 1.07 ratio         PTT - ( 05 Jun 2018 13:11 )  PTT:32.0 sec

## 2018-06-06 NOTE — DIETITIAN INITIAL EVALUATION ADULT. - OTHER INFO
Pt seen for consult: pressure ulcer. Pt seen for consult: unintended wt loss. Pt currently reports fair po intake/appetite, observed eating oatmeal on breakfast tray. Pt admits to intermittent difficulty swallowing which has been an ongoing issue, however declines downgrade to [facility] soft or pureed diet stating "I don't want baby food," is s/p SLP bedside assessment 5/2018 with recommendations to continue regular consistency diet. Pt denies GI distress at this time no N+V, diarrhea or constipation. Pt admits to progressive wt loss over the past ~9 months with wt loss of ~40 pounds, consistent with UBW of 160 pounds Sept 2018 and steady decline in weight now 116 pounds. Pt reports eating as well as he can, able to consume ~50% of meals and attempts to consume at least 1 Ensure Enlive daily strawberry flavor. Pt voices food preferences.

## 2018-06-06 NOTE — BEHAVIORAL HEALTH ASSESSMENT NOTE - NSBHCHARTREVIEWIMAGING_PSY_A_CORE FT
Exam: CT Brain No Contrast: 5/27/18  IMPRESSION:   No acute intracranial hemorrhage, mass effect, or evidence of acute   vascular territorial infarction.  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  If clinical symptoms persist or worsen, more sensitive evaluation with   brain MRI may be obtained, if no contraindications exist.      MRI / MRA Head No Contrast: 5/27/18  IMPRESSION:  Imaging is slightly limited by motion through the brain.No acute or   subacute infarction. Chronic bilateral PCA territory infarctions as   described.  Intracranial MR angiography is limited by patient motion. No obvious   aneurysm or vascular occlusion.  Extracranial MR angiography demonstrates right carotid bifurcation mild   atherosclerotic disease without flow-limiting stenosis. Otherwise no   significant stenosis.

## 2018-06-06 NOTE — PROVIDER CONTACT NOTE (OTHER) - ACTION/TREATMENT ORDERED:
Provider notified will assess Pt at bedside, ekg complete. As per provider give midodrine 10mg PO now.

## 2018-06-06 NOTE — PROGRESS NOTE ADULT - ASSESSMENT
82y with recent nasal procedure- cauterization and removal of granuloma near posterior aspect of right nasal septal perf, in out pt office 2 days ago now with a few episodes of hemoptysis, anemia and black stool today. Merocel packing was place in right nare in office yesterday- no active bleeding.

## 2018-06-06 NOTE — DIETITIAN INITIAL EVALUATION ADULT. - ENERGY NEEDS
ht:  63 inches, wt: 116.6 pounds (current, standing, +1 B/L leg edema noted). BMI: UBW: IBW: %IBW:  Other pertinent objective information: ht:  63 inches, wt: 116.6 pounds (current, standing, +1 B/L leg edema noted). BMI: 20.7 kG/m2. UBW: IBW: 124 pounds +/- 10%. %IBW: 94%  Other pertinent objective information: 82 year old male pt with PMH h/o CAD s/p 6 stents (most recent 4/2018) on ASA and plavix for stents, Esophageal CA s/p chemo/RT and esophagogastrectomy (8/14/2017)   .Patient presents to ED c/o bleeding from nasal and spitting up. Patient has also been having black stools. Yesterday he saw ENT for removal of a pyogenic granuloma. ht:  63 inches, wt: 116.6 pounds (current, standing, +1 B/L leg edema noted). BMI: 20.7 kG/m2. UBW: 160 pounds x 9 months ago. IBW: 124 pounds +/- 10%. %IBW: 94%  Other pertinent objective information: 82 year old male pt with PMH h/o CAD s/p 6 stents (most recent 4/2018) on ASA and plavix for stents, Esophageal CA s/p chemo/RT and esophagogastrectomy (8/14/2017). Patient presents to ED c/o bleeding from nasal passage and spitting up blood, (+) dark stool x 1 day. S/p ENT 6/4/18 for removal of a pyogenic granuloma. Stage 1 sacral pressure ulcer noted; R forearm wound noted pending wound specialist consult. ENT and GI following.

## 2018-06-06 NOTE — CHART NOTE - NSCHARTNOTEFT_GEN_A_CORE
Patient is a 82y old  Male who presents with a chief complaint of hemoptysis/anemia/epistaxis (05 Jun 2018 16:41)  Notified by RN that patient with what appears to be 2nd degree type 2 HB on telemetry, patient also noted to have BP 88/45.    Patient seen and examined at bedside.   Patient denied any headache, dizziness, chest pain, palpitations, acute dyspnea, nausea, vomiting, or abdominal pain.  Patient does endorse hemoptysis (he is currently admitted for epistaxis and hemoptysis secondary to epistaxis; he is s/p nasal packing by ENT).  Patient also endorses weakness and decrease in appetite that has persisted for the last few weeks.   Patient states during the last admission he was told that he has a first degree heart block.   He also states the he was started on medication to "keep his blood pressure up."    Patient also endorses hemoptysis that has been ongoing for approximately 1 month; he is followed by ENT for posterior epistaxis which is likely resulting in hemoptysis.   Of note, patient saw ENT yesterday which resulted in removal of a pyogenic granuloma.  He also endorses dark stools x 1 day. Denies hematochezia, hematuria, or hematemesis.   During his last admission, he received 2 units pRBC for anemia.         Vital Signs Last 24 Hrs  T(C): 36.3 (06 Jun 2018 00:36), Max: 36.4 (05 Jun 2018 15:35)  T(F): 97.4 (06 Jun 2018 00:36), Max: 97.5 (05 Jun 2018 15:35)  HR: 78 (06 Jun 2018 00:36) (65 - 81)  BP: 88/45 (06 Jun 2018 00:36) (88/45 - 108/61)  RR: 18 (06 Jun 2018 00:36) (18 - 20)  SpO2: 94% (06 Jun 2018 00:36) (94% - 99%)      Labs:                        9.3    7.2   )-----------( 223      ( 05 Jun 2018 13:11 )             28.0     06-05    140  |  103  |  36<H>  ----------------------------<  118<H>  4.5   |  25  |  0.82    Ca    9.1      05 Jun 2018 13:11    TPro  6.6  /  Alb  2.9<L>  /  TBili  0.2  /  DBili  x   /  AST  44<H>  /  ALT  35  /  AlkPhos  184<H>  06-05            Radiology:    Physical Exam:  General: Thin male, NAD  Neurology: A&Ox4, nonfocal, MAI x 4  Head:  Normocephalic, atraumatic  EENT: (+) nasal packing in place in right nare. No evidence of active bleeding from mouth or nose.  Respiratory: CTA B/L  CV: RRR, S1S2  Abdominal: Soft, NT, ND, no palpable mass  MSK: No edema, + peripheral pulses, FROM all 4 extremity  Skin: (+) wound right antecubital fossa         Assessment & Plan:  HPI:  82 yoM with Pt with PMHx of CAD on plavix/ASA. Patient presents with chief complaint of nasal bleeding and spitting up after presenting from ENT office. Patient has also been having black stools. Yesterday he saw ENT as well with removal of a pyogenic granuloma.    Patient now with second degree heart block, type 2 on telemetry at approximately 21:20-21:44 and at 00:47 (6/6/18).      Heart Block A/P:  >Telemetry with Sinus 70s with first degree heart block.   >Vital signs hemodynamically stable with the exception of BP   >EKG with NSR, HR 76 BPM, first degree heart block. No acute ST segment depressions/elevations. Compared with prior EKG in [place] from 6/1/18- Sinus bradycardia with second degree heart block and PVCs.   >STAT BMP, Mg, Phos ordered  >Currently not on any AVN blocking agents   >Continue with telemetry  >Consider EP consult for heart block      Hypotension A/P:  Likely secondary to underlying adrenal insufficiency (patient is followed by endocrinologist Dr. Reid and currently takes prednisone)  >Currently not on any anti-hypertensive medications   >Give Midodrine 10mg now  >Repeat BP in 1 hour   >Will give fluid bolus if no improvement in blood pressure   >Continue with midodrine 10mg TID, consider increasing dose if persistently hypotensive  >Continue with Prednisone 5mg QD- consider follow up endocrine consult for further recommendations/management        Hemoptysis/Epistaxis A/P:  >Nasal packing placed by ENT  >Monitor for further episodes of hemoptysis  >Trend CBC  >Continue with ASA/Plavix for now, consider d/c if persistent bleeding/anemia   >Nasal saline, 2 sprays to both nares 4 times a day  >Bactrim for prophylaxis while packing in place      Will continue to monitor and observe patient  Will endorse to primary team in AM    Rosana Schaeffer PA-C  Dept of Medicine   [id number]

## 2018-06-06 NOTE — BEHAVIORAL HEALTH ASSESSMENT NOTE - NSBHCHARTREVIEWLAB_PSY_A_CORE FT
7.1    5.1   )-----------( 198      ( 06 Jun 2018 12:12 )             21.1     06-06    140  |  104  |  32<H>  ----------------------------<  93  3.9   |  27  |  0.65    Ca    8.7      06 Jun 2018 06:29  Phos  2.7     06-06  Mg     1.8     06-06    TPro  6.6  /  Alb  2.9<L>  /  TBili  0.2  /  DBili  x   /  AST  44<H>  /  ALT  35  /  AlkPhos  184<H>  06-05

## 2018-06-06 NOTE — CONSULT NOTE ADULT - SUBJECTIVE AND OBJECTIVE BOX
Wound SURGERY CONSULT NOTE    HPI:  CHIEF COMPLAINT:Patient is a 82y old  Male who presents with a chief complaint of     HPI:Patient presents with chief complaint of nasal bleeding and spitting up after presenting from ENT office. Patient has also been having black stools. Yesterday he saw ENT as well with removal of a pyogenic granuloma.  	Patient has had dark stools x 1 day. Patient has been spitting up blood for > 1 month. No chest pain, shortness of breath. Patient has been fatigued.   	Patient is on midodrine.  	Patient normally walks with a walker, was able to walk yesterday but not today.  	PT is on plavix and aspirin for coronary artery disease with stents.        PAST MEDICAL & SURGICAL HISTORY:  Coronary artery disease: s/p 3 stents on June 30, 2017 at [facility] Dr. Lavelle Reid  OA (osteoarthritis) of knee: right  Former smoker, stopped smoking in distant past  Rheumatoid arthritis  Seizure disorder: 1 episode approximately 15 yrs ago  Asthma  Hyperlipidemia  BPH (benign prostatic hyperplasia)  HTN (hypertension)  Esophagus cancer  Chronic allergic rhinitis  BCC (basal cell carcinoma): skin  Cerebrovascular accident (CVA)  Anxiety  Anastomotic leak following esophagectomy: [place] [facility] esophagectomy  History of tonsillectomy  H/O heart artery stent: June 30, 2017  Knee arthropathy: left  History of total hip replacement: left  History of hernia repair      MEDICATIONS  (STANDING):  noted    MEDICATIONS  (PRN):      FAMILY HISTORY:  Family history of heart attack  Family history of pulmonary embolism: father @ 49 yr old  FH: CAD (coronary artery disease) (Sibling)      SOCIAL HISTORY:    [ ] Non-smoker  [ ] Smoker  [ ] Alcohol    Allergies    penicillin (Rash)    Intolerances    	    REVIEW OF SYSTEMS:  CONSTITUTIONAL: No fever, + weight loss, + fatigue  EYES: No eye pain, visual disturbances, or discharge  ENT:  No difficulty hearing, tinnitus, vertigo; No sinus or throat pain  NECK: No pain or stiffness  RESPIRATORY: No cough, wheezing, chills or hemoptysis; + mild Shortness of Breath  CARDIOVASCULAR: No chest pain, palpitations, passing out, dizziness, or leg swelling  GASTROINTESTINAL: No abdominal or epigastric pain. No nausea, vomiting, or hematemesis; No diarrhea or constipation. No melena or hematochezia.  GENITOURINARY: No dysuria, frequency, hematuria, or incontinence  NEUROLOGICAL: No headaches, memory loss, loss of strength, numbness, or tremors  SKIN: No itching, burning, rashes, or lesions   LYMPH Nodes: No enlarged glands  ENDOCRINE: No heat or cold intolerance; No hair loss  MUSCULOSKELETAL: No joint pain or swelling; No muscle, back, or extremity pain  PSYCHIATRIC: No depression, anxiety, mood swings, or difficulty sleeping  HEME/LYMPH: No easy bruising, or bleeding gums  ALLERGY AND IMMUNOLOGIC: No hives or eczema	    [ ] All others negative	  [ ] Unable to obtain    PHYSICAL EXAM:  T(C): 36.4 (06-05-18 @ 15:35), Max: 36.4 (06-05-18 @ 15:35)  HR: 65 (06-05-18 @ 15:35) (65 - 76)  BP: 100/60 (06-05-18 @ 15:35) (89/51 - 108/61)  RR: 18 (06-05-18 @ 15:35) (18 - 18)  SpO2: 99% (06-05-18 @ 15:35) (98% - 99%)  Wt(kg): --  I&O's Summary      Appearance: Normal	  HEENT:   Normal oral mucosa, PERRL, EOMI	  Lymphatic: No lymphadenopathy  Cardiovascular: Normal S1 S2, No JVD, + murmurs, No edema  Respiratory: Lungs clear to auscultation	  Psychiatry: A & O x 3, Mood & affect appropriate  Gastrointestinal:  Soft, Non-tender, + BS	  Skin: No rashes, No ecchymoses, No cyanosis	  Neurologic: Non-focal  Extremities: Normal range of motion, No clubbing, cyanosis or edema  Vascular: Peripheral pulses palpable 2+ bilaterally    TELEMETRY: 	    ECG:  	  RADIOLOGY:  OTHER: 	  	  LABS:	 	    CARDIAC MARKERS:                              9.3    7.2   )-----------( 223      ( 05 Jun 2018 13:11 )             28.0     06-05    140  |  103  |  36<H>  ----------------------------<  118<H>  4.5   |  25  |  0.82    Ca    9.1      05 Jun 2018 13:11    TPro  6.6  /  Alb  2.9<L>  /  TBili  0.2  /  DBili  x   /  AST  44<H>  /  ALT  35  /  AlkPhos  184<H>  06-05    proBNP:   Lipid Profile:   HgA1c:   TSH:   PT/INR - ( 05 Jun 2018 13:11 )   PT: 11.6 sec;   INR: 1.07 ratio         PTT - ( 05 Jun 2018 13:11 )  PTT:32.0 sec    PREVIOUS DIAGNOSTIC TESTING:    < from: Transthoracic Echocardiogram (04.21.18 @ 14:23) >  1. Mitral annular calcification, otherwise normal mitral  valve. Mild mitral regurgitation. Mean transmitral valve  gradient equals 4 mm Hg, consistent with mild mitral  stenosis. (HRabout 60s bpm)  2. Calcified trileaflet aortic valve with decreased  opening. Peak transaortic valve gradient equals 34 mm Hg,  mean transaortic valve gradient equals 19 mm Hg, estimated  aortic valve area equals 1.3 sqcm (by continuity equation),  aortic valve velocity time integral equals 59 cm,  consistent with moderate aortic stenosis. Aortic valve  assessment is limited by the presence of frequent PVCs and  the degree of aortic stenosis may be underestimated.  Visually, the aortic valve appears moderate-severely  stenotic. Consider additional imaging of the aortic valve  such as with additional TTE or TINO imaging if clinically  indicated.No aortic valve regurgitation seen.  3. Severely dilated left atrium.  LA volume index = 64  cc/m2.  4. Endocardium not well visualized; grossly normal left  ventricular systolic function. Septal motion consistent  with conduction defect. There is a false tendon in the LV  cavity (normal variant).  5. The right ventricle is not well visualized; right  ventricle appears enlarged with normal systolic function. (05 Jun 2018 16:41)      PAST MEDICAL & SURGICAL HISTORY:  Coronary artery disease: s/p 3 stents on June 30, 2017 at [facility] Dr. Lavelle Reid  OA (osteoarthritis) of knee: right  Former smoker, stopped smoking in distant past  Rheumatoid arthritis  Seizure disorder: 1 episode approximately 15 yrs ago  Asthma  Hyperlipidemia  BPH (benign prostatic hyperplasia)  HTN (hypertension)  Esophagus cancer w/ Anastomotic leak s/p esophagectomy: [facility] esophagectomy  Chronic allergic rhinitis  BCC (basal cell carcinoma): skin  Cerebrovascular accident (CVA)  Anxiety  s/p tonsillectomy  Knee arthropathy: left  s/p total hip replacement: left  s/p hernia repair      REVIEW OF SYSTEMS    Skin/ MSK: see HPI  All other systems negative    MEDICATIONS  (STANDING):  ALPRAZolam 0.5 milliGRAM(s) Oral two times a day  amitriptyline 10 milliGRAM(s) Oral two times a day  aspirin enteric coated 81 milliGRAM(s) Oral daily  BACItracin   Ointment 1 Application(s) Topical three times a day  clopidogrel Tablet 75 milliGRAM(s) Oral daily  cyanocobalamin 500 MICROGram(s) Oral daily  finasteride 5 milliGRAM(s) Oral daily  fluticasone propionate 50 MICROgram(s)/spray Nasal Spray 1 Spray(s) Both Nostrils two times a day  folic acid 1 milliGRAM(s) Oral daily  levothyroxine 25 MICROGram(s) Oral daily  midodrine 10 milliGRAM(s) Oral every 8 hours  montelukast 10 milliGRAM(s) Oral daily  phenytoin   Capsule 200 milliGRAM(s) Oral two times a day  predniSONE   Tablet 5 milliGRAM(s) Oral daily  pyridoxine 100 milliGRAM(s) Oral daily  simvastatin 20 milliGRAM(s) Oral at bedtime  sucralfate suspension 1 Gram(s) Oral four times a day  tamsulosin 0.4 milliGRAM(s) Oral at bedtime  tiotropium 18 MICROgram(s) Capsule 1 Capsule(s) Inhalation daily  trimethoprim  160 mG/sulfamethoxazole 800 mG 1 Tablet(s) Oral daily    MEDICATIONS  (PRN):  ALBUTerol    90 MICROgram(s) HFA Inhaler 1 Puff(s) Inhalation two times a day PRN Bronchospasm  sodium chloride 0.65% Nasal 1 Spray(s) Both Nostrils three times a day PRN Nasal Congestion  sodium chloride 0.65% Nasal 1 Spray(s) Both Nostrils daily PRN Nasal Congestion      Allergies    penicillin (Rash)    SOCIAL HISTORY:  ; Former smoker, Denies smoking, ETOH, drugs    FAMILY HISTORY:  Family history of heart attack  Family history of pulmonary embolism: father @ 49 yr old  FH: CAD (coronary artery disease) (Sibling)      Vital Signs Last 24 Hrs  T(C): 36.4 (06 Jun 2018 11:00), Max: 36.4 (05 Jun 2018 21:43)  T(F): 97.5 (06 Jun 2018 11:00), Max: 97.6 (06 Jun 2018 05:00)  HR: 79 (06 Jun 2018 11:00) (72 - 79)  BP: 88/45 (06 Jun 2018 11:00) (88/45 - 104/56)  BP(mean): --  RR: 16 (06 Jun 2018 11:00) (16 - 18)  SpO2: 93% (06 Jun 2018 11:00) (93% - 96%)    NAD / A&Ox3  cachectic/ frail  WD/  WG  Versa Care P500 bed    Cardiovascular: RRR (+)m    Respiratory: CTA    Gastrointestinal soft NT/ND (+)BS      Neurology  weakened strength & sensation grossly intact    Musculoskeletal/Vascular:  FROM x4  no edema   BLE equally warm  (+)radial pulses  no acute ischemia noted  no deformities/ contractures    Skin:  frail,  ecchymosis w/o hematoma  2 wounds noted-    Rt Buttock w/ excoriation/ denuded skin - superficial  w/o drainage    RUE wound dry granular w/ fibrinous exudate  3.5cm x 1cm x 0.1cm     no  drainage  No odor, erythema, increased warmth, tenderness, induration, fluctuance    LABS:  06-06    140  |  104  |  32<H>  ----------------------------<  93  3.9   |  27  |  0.65    Ca    8.7      06 Jun 2018 06:29  Phos  2.7     06-06  Mg     1.8     06-06    TPro  6.6  /  Alb  2.9<L>  /  TBili  0.2  /  DBili  x   /  AST  44<H>  /  ALT  35  /  AlkPhos  184<H>  06-05                          7.1    5.1   )-----------( 198      ( 06 Jun 2018 12:12 )             21.1     PT/INR - ( 05 Jun 2018 13:11 )   PT: 11.6 sec;   INR: 1.07 ratio    PTT - ( 05 Jun 2018 13:11 )  PTT:32.0 sec      RADIOLOGY & ADDITIONAL STUDIES:  Cultures:    A/P:    Compression BLE  Consider JUANITA/PVR, XRay, Duplex, MRI, CT  BLE elevation  Abx per Medicine/ ID  Moisturize intact skin w/ SWEEN cream BID  con't Nutrition (as tolerated), Nutrition Consult  con't Offloading   con't Pericare  Care as per medicine will follow w/ you  Upon discharge f/u as outpatient at [facility] [address] [place] [address] [phone]  Seen w/ attng and D/w team  Thank you for this consult  Scarlet Polanco PA-C CWS [id number] Wound SURGERY CONSULT NOTE    HPI:   83yo M w/ PMH/o OA/ RA, CAD s/p Stents, Seizure disorder, Hyperlipidemia, BPH, HTN, Esophagus cancer w/ h/o Anastomotic leak s/p [place] [facility] esophagectomy, BCC  of skin, CVA presented with chief complaint of nasal bleeding and spitting up after presenting from ENT office. Patient has also been having black stools. Yesterday he saw ENT as well with removal of a pyogenic granuloma.  Patient has had dark stools x 1 day. Patient has been spitting up blood for > 1 month. No chest pain, shortness of breath. Patient has been fatigued. Patient is on midodrine.  Patient normally walks with a walker, was able to walk yesterday but not today.  PT is on plavix and aspirin for coronary artery disease with stents.  Pt is currently receiving pRBC 1unit to tx anemia.    Wound consult requested by team as pt had appt at wound center on monday but was unable to go, as pt was in hospital/ ER.  Pt w/ wound on RUE for several weeks.  VNS was ordered by PMD who was using xeroform dressings.  Pt has frail skin and on blood thinner so this skin tear was bloody and wound is taking long time to heal.  no c/o pain.  DSD placed awaiting consult.  Pt also has a healing abrasion that is causing pt discomfort.  Humaira w/ pericare being used per protocol.  Offloading & pericare initiated upon admission per protocol.  Pt is continent and mobile.   No drainage or bleeding from either currently.  No odor, redness, warmth, nor f/c/s.  Dressing often slips off.  Wife and daughter at bedside.  All questions asked and answered to their satisfaction.   Discussed importance of moisturizing frail skin.         PAST MEDICAL & SURGICAL HISTORY:  Coronary artery disease: s/p 3 stents on June 30, 2017 at [place] Dr. Lavelle Reid  OA (osteoarthritis) of knee: right  Former smoker, stopped smoking in distant past  Rheumatoid arthritis  Seizure disorder: 1 episode approximately 15 yrs ago  Asthma  Hyperlipidemia  BPH (benign prostatic hyperplasia)  HTN (hypertension)  Esophagus cancer w/ h/o Anastomotic leak s/p [place] [facility] esophagectomy  Chronic allergic rhinitis  BCC (basal cell carcinoma): skin  Cerebrovascular accident (CVA)  Anxiety  s/p tonsillectomy  s/p Knee arthropathy: left  s/p total hip replacement: left  s/p hernia repair    REVIEW OF SYSTEMS    Skin/ MSK: see HPI  All other systems negative    MEDICATIONS  (STANDING):  ALPRAZolam 0.5 milliGRAM(s) Oral two times a day  amitriptyline 10 milliGRAM(s) Oral two times a day  aspirin enteric coated 81 milliGRAM(s) Oral daily  BACItracin   Ointment 1 Application(s) Topical three times a day  clopidogrel Tablet 75 milliGRAM(s) Oral daily  cyanocobalamin 500 MICROGram(s) Oral daily  finasteride 5 milliGRAM(s) Oral daily  fluticasone propionate 50 MICROgram(s)/spray Nasal Spray 1 Spray(s) Both Nostrils two times a day  folic acid 1 milliGRAM(s) Oral daily  levothyroxine 25 MICROGram(s) Oral daily  midodrine 10 milliGRAM(s) Oral every 8 hours  montelukast 10 milliGRAM(s) Oral daily  phenytoin   Capsule 200 milliGRAM(s) Oral two times a day  predniSONE   Tablet 5 milliGRAM(s) Oral daily  pyridoxine 100 milliGRAM(s) Oral daily  simvastatin 20 milliGRAM(s) Oral at bedtime  sucralfate suspension 1 Gram(s) Oral four times a day  tamsulosin 0.4 milliGRAM(s) Oral at bedtime  tiotropium 18 MICROgram(s) Capsule 1 Capsule(s) Inhalation daily  trimethoprim  160 mG/sulfamethoxazole 800 mG 1 Tablet(s) Oral daily    MEDICATIONS  (PRN):  ALBUTerol    90 MICROgram(s) HFA Inhaler 1 Puff(s) Inhalation two times a day PRN Bronchospasm  sodium chloride 0.65% Nasal 1 Spray(s) Both Nostrils three times a day PRN Nasal Congestion  sodium chloride 0.65% Nasal 1 Spray(s) Both Nostrils daily PRN Nasal Congestion      Allergies    penicillin (Rash)    SOCIAL HISTORY:  ; Former smoker, Denies smoking, ETOH, drugs    FAMILY HISTORY:  Family history of heart attack  Family history of pulmonary embolism: father @ 49 yr old  FH: CAD (coronary artery disease) (Sibling)      Vital Signs Last 24 Hrs  T(C): 36.4 (06 Jun 2018 11:00), Max: 36.4 (05 Jun 2018 21:43)  T(F): 97.5 (06 Jun 2018 11:00), Max: 97.6 (06 Jun 2018 05:00)  HR: 79 (06 Jun 2018 11:00) (72 - 79)  BP: 88/45 (06 Jun 2018 11:00) (88/45 - 104/56)  BP(mean): --  RR: 16 (06 Jun 2018 11:00) (16 - 18)  SpO2: 93% (06 Jun 2018 11:00) (93% - 96%)    NAD / A&Ox3  cachectic/ frail  WD/  WG  Versa Care P500 bed    Cardiovascular: RRR (+)m    Respiratory: CTA    Gastrointestinal soft NT/ND (+)BS      Neurology  weakened strength & sensation grossly intact    Musculoskeletal/Vascular:  FROM x4  no edema   BLE equally warm  (+)radial pulses  no acute ischemia noted  no deformities/ contractures    Skin:  frail,  ecchymosis w/o hematoma  2 wounds noted-    Rt Buttock w/ excoriation/ denuded skin - superficial  w/o drainage    RUE wound dry granular w/ fibrinous exudate  3.5cm x 1cm x 0.1cm     no  drainage  No odor, erythema, increased warmth, tenderness, induration, fluctuance    LABS:  06-06    140  |  104  |  32<H>  ----------------------------<  93  3.9   |  27  |  0.65    Ca    8.7      06 Jun 2018 06:29  Phos  2.7     06-06  Mg     1.8     06-06    TPro  6.6  /  Alb  2.9<L>  /  TBili  0.2  /  DBili  x   /  AST  44<H>  /  ALT  35  /  AlkPhos  184<H>  06-05                          7.1    5.1   )-----------( 198      ( 06 Jun 2018 12:12 )             21.1     PT/INR - ( 05 Jun 2018 13:11 )   PT: 11.6 sec;   INR: 1.07 ratio    PTT - ( 05 Jun 2018 13:11 )  PTT:32.0 sec

## 2018-06-06 NOTE — BEHAVIORAL HEALTH ASSESSMENT NOTE - OTHER PAST PSYCHIATRIC HISTORY (INCLUDE DETAILS REGARDING ONSET, COURSE OF ILLNESS, INPATIENT/OUTPATIENT TREATMENT)
Pt is currently under the care of psychiatrist, Dr. Colten Gibbs for the past 15 years for the treatment of anxiety.  Pt's anxiety has been managed with 0.5mg of Xanax BID.  Pt does not have a hx of psychiatric hospitalizations.

## 2018-06-06 NOTE — BEHAVIORAL HEALTH ASSESSMENT NOTE - GROOMING
Patient's mother requesting refill on Vyvanse. Is this ok to refill this medication? Last refill was 6/14/2017 #30 with 0 refills. Last visit was 6/14/2017 for a well child exam.    Good

## 2018-06-07 ENCOUNTER — TRANSCRIPTION ENCOUNTER (OUTPATIENT)
Age: 83
End: 2018-06-07

## 2018-06-07 DIAGNOSIS — Z71.89 OTHER SPECIFIED COUNSELING: ICD-10-CM

## 2018-06-07 DIAGNOSIS — Z51.5 ENCOUNTER FOR PALLIATIVE CARE: ICD-10-CM

## 2018-06-07 DIAGNOSIS — R04.2 HEMOPTYSIS: ICD-10-CM

## 2018-06-07 DIAGNOSIS — R53.81 OTHER MALAISE: ICD-10-CM

## 2018-06-07 DIAGNOSIS — C15.9 MALIGNANT NEOPLASM OF ESOPHAGUS, UNSPECIFIED: ICD-10-CM

## 2018-06-07 LAB
ANION GAP SERPL CALC-SCNC: 7 MMOL/L — SIGNIFICANT CHANGE UP (ref 5–17)
BUN SERPL-MCNC: 27 MG/DL — HIGH (ref 7–23)
CALCIUM SERPL-MCNC: 8.3 MG/DL — LOW (ref 8.4–10.5)
CHLORIDE SERPL-SCNC: 105 MMOL/L — SIGNIFICANT CHANGE UP (ref 96–108)
CO2 SERPL-SCNC: 27 MMOL/L — SIGNIFICANT CHANGE UP (ref 22–31)
CREAT SERPL-MCNC: 0.62 MG/DL — SIGNIFICANT CHANGE UP (ref 0.5–1.3)
GLUCOSE SERPL-MCNC: 80 MG/DL — SIGNIFICANT CHANGE UP (ref 70–99)
HCT VFR BLD CALC: 22.1 % — LOW (ref 39–50)
HCT VFR BLD CALC: 22.3 % — LOW (ref 39–50)
HCT VFR BLD CALC: 23.4 % — LOW (ref 39–50)
HCT VFR BLD CALC: 24.9 % — LOW (ref 39–50)
HGB BLD-MCNC: 7.3 G/DL — LOW (ref 13–17)
HGB BLD-MCNC: 7.7 G/DL — LOW (ref 13–17)
HGB BLD-MCNC: 7.9 G/DL — LOW (ref 13–17)
HGB BLD-MCNC: 8.6 G/DL — LOW (ref 13–17)
MAGNESIUM SERPL-MCNC: 1.9 MG/DL — SIGNIFICANT CHANGE UP (ref 1.6–2.6)
MCHC RBC-ENTMCNC: 29.1 PG — SIGNIFICANT CHANGE UP (ref 27–34)
MCHC RBC-ENTMCNC: 31 PG — SIGNIFICANT CHANGE UP (ref 27–34)
MCHC RBC-ENTMCNC: 31.8 PG — SIGNIFICANT CHANGE UP (ref 27–34)
MCHC RBC-ENTMCNC: 32.3 PG — SIGNIFICANT CHANGE UP (ref 27–34)
MCHC RBC-ENTMCNC: 32.7 GM/DL — SIGNIFICANT CHANGE UP (ref 32–36)
MCHC RBC-ENTMCNC: 33.9 GM/DL — SIGNIFICANT CHANGE UP (ref 32–36)
MCHC RBC-ENTMCNC: 34.6 GM/DL — SIGNIFICANT CHANGE UP (ref 32–36)
MCHC RBC-ENTMCNC: 35 GM/DL — SIGNIFICANT CHANGE UP (ref 32–36)
MCV RBC AUTO: 88.8 FL — SIGNIFICANT CHANGE UP (ref 80–100)
MCV RBC AUTO: 91.3 FL — SIGNIFICANT CHANGE UP (ref 80–100)
MCV RBC AUTO: 91.9 FL — SIGNIFICANT CHANGE UP (ref 80–100)
MCV RBC AUTO: 92.2 FL — SIGNIFICANT CHANGE UP (ref 80–100)
PHOSPHATE SERPL-MCNC: 2.1 MG/DL — LOW (ref 2.5–4.5)
PLATELET # BLD AUTO: 186 K/UL — SIGNIFICANT CHANGE UP (ref 150–400)
PLATELET # BLD AUTO: 197 K/UL — SIGNIFICANT CHANGE UP (ref 150–400)
PLATELET # BLD AUTO: 209 K/UL — SIGNIFICANT CHANGE UP (ref 150–400)
PLATELET # BLD AUTO: 217 K/UL — SIGNIFICANT CHANGE UP (ref 150–400)
POTASSIUM SERPL-MCNC: 4.2 MMOL/L — SIGNIFICANT CHANGE UP (ref 3.5–5.3)
POTASSIUM SERPL-SCNC: 4.2 MMOL/L — SIGNIFICANT CHANGE UP (ref 3.5–5.3)
RBC # BLD: 2.4 M/UL — LOW (ref 4.2–5.8)
RBC # BLD: 2.51 M/UL — LOW (ref 4.2–5.8)
RBC # BLD: 2.56 M/UL — LOW (ref 4.2–5.8)
RBC # BLD: 2.71 M/UL — LOW (ref 4.2–5.8)
RBC # FLD: 17 % — HIGH (ref 10.3–14.5)
RBC # FLD: 17.3 % — HIGH (ref 10.3–14.5)
RBC # FLD: 17.4 % — HIGH (ref 10.3–14.5)
RBC # FLD: 20.1 % — HIGH (ref 10.3–14.5)
SODIUM SERPL-SCNC: 139 MMOL/L — SIGNIFICANT CHANGE UP (ref 135–145)
WBC # BLD: 4.77 K/UL — SIGNIFICANT CHANGE UP (ref 3.8–10.5)
WBC # BLD: 5.4 K/UL — SIGNIFICANT CHANGE UP (ref 3.8–10.5)
WBC # BLD: 6.6 K/UL — SIGNIFICANT CHANGE UP (ref 3.8–10.5)
WBC # BLD: 6.7 K/UL — SIGNIFICANT CHANGE UP (ref 3.8–10.5)
WBC # FLD AUTO: 4.77 K/UL — SIGNIFICANT CHANGE UP (ref 3.8–10.5)
WBC # FLD AUTO: 5.4 K/UL — SIGNIFICANT CHANGE UP (ref 3.8–10.5)
WBC # FLD AUTO: 6.6 K/UL — SIGNIFICANT CHANGE UP (ref 3.8–10.5)
WBC # FLD AUTO: 6.7 K/UL — SIGNIFICANT CHANGE UP (ref 3.8–10.5)

## 2018-06-07 PROCEDURE — 99223 1ST HOSP IP/OBS HIGH 75: CPT

## 2018-06-07 PROCEDURE — 99497 ADVNCD CARE PLAN 30 MIN: CPT | Mod: 25

## 2018-06-07 PROCEDURE — 99498 ADVNCD CARE PLAN ADDL 30 MIN: CPT

## 2018-06-07 RX ORDER — AMITRIPTYLINE HCL 25 MG
1 TABLET ORAL
Qty: 0 | Refills: 0 | COMMUNITY
Start: 2018-06-07

## 2018-06-07 RX ORDER — SODIUM CHLORIDE 0.65 %
1 AEROSOL, SPRAY (ML) NASAL
Qty: 0 | Refills: 0 | COMMUNITY
Start: 2018-06-07

## 2018-06-07 RX ORDER — AMITRIPTYLINE HCL 25 MG
1 TABLET ORAL
Qty: 0 | Refills: 0 | COMMUNITY

## 2018-06-07 RX ORDER — SUCRALFATE 1 G
10 TABLET ORAL
Qty: 400 | Refills: 0 | OUTPATIENT
Start: 2018-06-07 | End: 2018-06-16

## 2018-06-07 RX ADMIN — Medication 10 MILLIGRAM(S): at 05:05

## 2018-06-07 RX ADMIN — Medication 1 MILLIGRAM(S): at 11:22

## 2018-06-07 RX ADMIN — Medication 1 GRAM(S): at 05:06

## 2018-06-07 RX ADMIN — Medication 100 MILLIGRAM(S): at 11:22

## 2018-06-07 RX ADMIN — Medication 1 GRAM(S): at 22:23

## 2018-06-07 RX ADMIN — FINASTERIDE 5 MILLIGRAM(S): 5 TABLET, FILM COATED ORAL at 11:22

## 2018-06-07 RX ADMIN — Medication 1 GRAM(S): at 11:22

## 2018-06-07 RX ADMIN — Medication 0.5 MILLIGRAM(S): at 16:57

## 2018-06-07 RX ADMIN — CLOPIDOGREL BISULFATE 75 MILLIGRAM(S): 75 TABLET, FILM COATED ORAL at 16:57

## 2018-06-07 RX ADMIN — MIDODRINE HYDROCHLORIDE 10 MILLIGRAM(S): 2.5 TABLET ORAL at 05:05

## 2018-06-07 RX ADMIN — Medication 1 GRAM(S): at 16:57

## 2018-06-07 RX ADMIN — Medication 81 MILLIGRAM(S): at 16:57

## 2018-06-07 RX ADMIN — MIDODRINE HYDROCHLORIDE 10 MILLIGRAM(S): 2.5 TABLET ORAL at 22:23

## 2018-06-07 RX ADMIN — SIMVASTATIN 20 MILLIGRAM(S): 20 TABLET, FILM COATED ORAL at 22:23

## 2018-06-07 RX ADMIN — Medication 0.5 MILLIGRAM(S): at 05:09

## 2018-06-07 RX ADMIN — TAMSULOSIN HYDROCHLORIDE 0.4 MILLIGRAM(S): 0.4 CAPSULE ORAL at 22:23

## 2018-06-07 RX ADMIN — Medication 25 MICROGRAM(S): at 05:05

## 2018-06-07 RX ADMIN — PREGABALIN 500 MICROGRAM(S): 225 CAPSULE ORAL at 11:23

## 2018-06-07 RX ADMIN — Medication 1 SPRAY(S): at 05:06

## 2018-06-07 RX ADMIN — Medication 200 MILLIGRAM(S): at 16:57

## 2018-06-07 RX ADMIN — Medication 1 TABLET(S): at 11:22

## 2018-06-07 RX ADMIN — MIDODRINE HYDROCHLORIDE 10 MILLIGRAM(S): 2.5 TABLET ORAL at 14:47

## 2018-06-07 RX ADMIN — Medication 10 MILLIGRAM(S): at 16:57

## 2018-06-07 RX ADMIN — Medication 5 MILLIGRAM(S): at 05:06

## 2018-06-07 RX ADMIN — Medication 200 MILLIGRAM(S): at 05:06

## 2018-06-07 RX ADMIN — Medication 100 MILLIGRAM(S): at 22:52

## 2018-06-07 RX ADMIN — MONTELUKAST 10 MILLIGRAM(S): 4 TABLET, CHEWABLE ORAL at 11:22

## 2018-06-07 NOTE — CONSULT NOTE ADULT - PROBLEM SELECTOR RECOMMENDATION 2
Actively receiving transfusions  ?location  melena reported  no forthcoming endoscopic intervention  symptomatic anemia: fatigue  No cp or sob

## 2018-06-07 NOTE — PROGRESS NOTE ADULT - SUBJECTIVE AND OBJECTIVE BOX
- Patient seen and examined.  - In summary, patient is a 82 year old man who presented with hemoptysis (31 May 2018 14:01)  - Today, patient is without complaints.         *****MEDICATIONS:    MEDICATIONS  (STANDING):  ALPRAZolam 0.5 milliGRAM(s) Oral two times a day  amitriptyline 10 milliGRAM(s) Oral two times a day  aspirin enteric coated 81 milliGRAM(s) Oral daily  clopidogrel Tablet 75 milliGRAM(s) Oral daily  cyanocobalamin 500 MICROGram(s) Oral daily  finasteride 5 milliGRAM(s) Oral daily  fluticasone propionate 50 MICROgram(s)/spray Nasal Spray 1 Spray(s) Both Nostrils two times a day  folic acid 1 milliGRAM(s) Oral daily  levothyroxine 25 MICROGram(s) Oral daily  midodrine 10 milliGRAM(s) Oral every 8 hours  montelukast 10 milliGRAM(s) Oral daily  phenytoin   Capsule 200 milliGRAM(s) Oral two times a day  predniSONE   Tablet 5 milliGRAM(s) Oral daily  pyridoxine 100 milliGRAM(s) Oral daily  simvastatin 20 milliGRAM(s) Oral at bedtime  sucralfate suspension 1 Gram(s) Oral four times a day  tamsulosin 0.4 milliGRAM(s) Oral at bedtime  tiotropium 18 MICROgram(s) Capsule 1 Capsule(s) Inhalation daily  trimethoprim  160 mG/sulfamethoxazole 800 mG 1 Tablet(s) Oral daily    MEDICATIONS  (PRN):  ALBUTerol    90 MICROgram(s) HFA Inhaler 1 Puff(s) Inhalation two times a day PRN Bronchospasm  sodium chloride 0.65% Nasal 1 Spray(s) Both Nostrils three times a day PRN Nasal Congestion  sodium chloride 0.65% Nasal 1 Spray(s) Both Nostrils daily PRN Nasal Congestion                 ***** REVIEW OF SYSTEM:  GEN: no fever, no chills, no pain  RESP: no SOB, no cough, no sputum  CVS: no chest pain, no palpitations, no edema  GI: no abdominal pain, no nausea, no vomiting, no constipation, no diarrhea  : no dysuria, no frequency  NEURO: no headache, no dizziness  PSYCH: no depression, not anxious  Derm : no itching, no rash         ***** VITAL SIGNS:    T(F): 97.7 (18 @ 04:20), Max: 97.7 (18 @ 04:20)  HR: 83 (18 @ 04:20) (69 - 83)  BP: 108/67 (18 @ 04:20) (88/45 - 127/72)  RR: 16 (18 @ 04:20) (16 - 18)  SpO2: 98% (18 @ 04:20) (93% - 98%)  Wt(kg): --  ,   I&O's Summary    2018 07:  -  2018 07:00  --------------------------------------------------------  IN: 240 mL / OUT: 600 mL / NET: -360 mL    2018 07:01  -  2018 09:27  --------------------------------------------------------  IN: 360 mL / OUT: 200 mL / NET: 160 mL             *****PHYSICAL EXAM:  GEN: A&O X 3 , NAD , comfortable  HEENT: NCAT, EOMI, MMM, no icterus  NECK: Supple, No JVD  CVS: S1S2 , regular , No M/R/G appreciated  PULM: CTA B/L,  no W/R/R appreciated  ABD.: soft. non tender, non distended,  bowel sounds present  Extrem: intact pulses , no edema noted  Derm: No rash or ecchymosis noted  PSYCH: normal mood, no depression, not anxious         *****LAB AND IMAGIN.3    4.77  )-----------( 186      ( 2018 07:53 )             22.3                   139  |  105  |  27<H>  ----------------------------<  80  4.2   |  27  |  0.62    Ca    8.3<L>      2018 06:16  Phos  2.1     06-  Mg     1.9     -    TPro  6.6  /  Alb  2.9<L>  /  TBili  0.2  /  DBili  x   /  AST  44<H>  /  ALT  35  /  AlkPhos  184<H>  06-05    PT/INR - ( 2018 13:11 )   PT: 11.6 sec;   INR: 1.07 ratio                 [All pertinent recent Imaging/Reports reviewed]         *****A S S E S S M E N T   A N D   P L A N :  82M  with CAD s/p recent stents orthostatic hypotension who presented with recurrent hemoptyses  f/u ENT attending  prior ROJAS  continue asa and plavix  continue midodrine  monitor H/H  DCP when h/h stable, ENT clears    __________________________  AJamal Brady D.O.

## 2018-06-07 NOTE — DISCHARGE NOTE ADULT - MEDICATION SUMMARY - MEDICATIONS TO STOP TAKING
I will STOP taking the medications listed below when I get home from the hospital:    Azithromycin 5 Day Dose Pack 250 mg oral tablet  -- 1 tab(s) by mouth once a day

## 2018-06-07 NOTE — DISCHARGE NOTE ADULT - NS AS DC STROKE DX YN
"Chief Complaint   Patient presents with     Depression     Medication check and refills     Anxiety       Initial /67 (BP Location: Right arm, Patient Position: Chair, Cuff Size: Adult Regular)  Pulse 71  Temp 97.8  F (36.6  C) (Oral)  Ht 5' 4.96\" (1.65 m)  Wt 162 lb (73.5 kg)  SpO2 99%  Breastfeeding? No  BMI 26.99 kg/m2 Estimated body mass index is 26.99 kg/(m^2) as calculated from the following:    Height as of this encounter: 5' 4.96\" (1.65 m).    Weight as of this encounter: 162 lb (73.5 kg).  Medication Reconciliation: complete     Oscar Mata CMA    " no

## 2018-06-07 NOTE — PROGRESS NOTE ADULT - PROBLEM SELECTOR PLAN 1
Plan: - d/c Nasal Packing   - Strict blood pressure control.  - Nasal saline, 2 sprays to both nares 4 times a day  - Avoid nasal trauma; no nose rubbing, blowing or manipulating nasal packing.  Sneeze with mouth open and pinching nares.  - Avoid bending with head blow the waist.    -No heavy lifting.
- Continue with Nasal Packing   - Strict blood pressure control.  - Nasal saline, 2 sprays to both nares 4 times a day  - Avoid nasal trauma; no nose rubbing, blowing or manipulating nasal packing.  Sneeze with mouth open and pinching nares.  - Avoid bending with head blow the waist.    -No heavy lifting.  - Consider pulmonology consult

## 2018-06-07 NOTE — CONSULT NOTE ADULT - SUBJECTIVE AND OBJECTIVE BOX
T(C): 36.6 (06-07-18 @ 13:36), Max: 36.6 (06-07-18 @ 13:36)  HR: 71 (06-07-18 @ 13:36) (69 - 83)  BP: 90/49 (06-07-18 @ 13:36) (90/49 - 127/72)  RR: 16 (06-07-18 @ 13:36) (16 - 16)  SpO2: 97% (06-07-18 @ 13:36) (97% - 98%)  Wt(kg): --      06 Jun 2018 07:01  -  07 Jun 2018 07:00  --------------------------------------------------------  IN:    Oral Fluid: 240 mL  Total IN: 240 mL    OUT:    Voided: 600 mL  Total OUT: 600 mL    Total NET: -360 mL      07 Jun 2018 07:01  -  07 Jun 2018 14:13  --------------------------------------------------------  IN:    Oral Fluid: 480 mL  Total IN: 480 mL    OUT:    Voided: 350 mL  Total OUT: 350 mL    Total NET: 130 mL          06-06 @ 07:01  -  06-07 @ 07:00  --------------------------------------------------------  IN: 240 mL / OUT: 600 mL / NET: -360 mL    06-07 @ 07:01  -  06-07 @ 14:13  --------------------------------------------------------  IN: 480 mL / OUT: 350 mL / NET: 130 mL      CAPILLARY BLOOD GLUCOSE            ALBUTerol    90 MICROgram(s) HFA Inhaler 1 Puff(s) Inhalation two times a day PRN  ALPRAZolam 0.5 milliGRAM(s) Oral two times a day  amitriptyline 10 milliGRAM(s) Oral two times a day  aspirin enteric coated 81 milliGRAM(s) Oral daily  clopidogrel Tablet 75 milliGRAM(s) Oral daily  cyanocobalamin 500 MICROGram(s) Oral daily  finasteride 5 milliGRAM(s) Oral daily  fluticasone propionate 50 MICROgram(s)/spray Nasal Spray 1 Spray(s) Both Nostrils two times a day  folic acid 1 milliGRAM(s) Oral daily  levothyroxine 25 MICROGram(s) Oral daily  midodrine 10 milliGRAM(s) Oral every 8 hours  montelukast 10 milliGRAM(s) Oral daily  phenytoin   Capsule 200 milliGRAM(s) Oral two times a day  predniSONE   Tablet 5 milliGRAM(s) Oral daily  pyridoxine 100 milliGRAM(s) Oral daily  simvastatin 20 milliGRAM(s) Oral at bedtime  sodium chloride 0.65% Nasal 1 Spray(s) Both Nostrils three times a day PRN  sodium chloride 0.65% Nasal 1 Spray(s) Both Nostrils daily PRN  sucralfate suspension 1 Gram(s) Oral four times a day  tamsulosin 0.4 milliGRAM(s) Oral at bedtime  tiotropium 18 MICROgram(s) Capsule 1 Capsule(s) Inhalation daily  trimethoprim  160 mG/sulfamethoxazole 800 mG 1 Tablet(s) Oral daily          06-07    139  |  105  |  27<H>  ----------------------------<  80  4.2   |  27  |  0.62    Ca    8.3<L>      07 Jun 2018 06:16  Phos  2.1     06-07  Mg     1.9     06-07        Procalc  BNP  ABG                          7.9    6.6   )-----------( 209      ( 07 Jun 2018 13:33 )             23.4           blood and urine cultures HPI:  CHIEF COMPLAINT:Patient is a 82y old  Male who presents with a chief complaint of     HPI:Patient presents with chief complaint of nasal bleeding and spitting up after presenting from ENT office. Patient has also been having black stools. Yesterday he saw ENT as well with removal of a pyogenic granuloma.  	Patient has had dark stools x 1 day. Patient has been spitting up blood for > 1 month. No chest pain, shortness of breath. Patient has been fatigued.   	Patient is on midodrine.  	Patient normally walks with a walker, was able to walk yesterday but not today.  	PT is on plavix and aspirin for coronary artery disease with stents.          PT and his family voiced concerns about communication in the hospital  See full account in GOC note    PAST MEDICAL & SURGICAL HISTORY:  Coronary artery disease: s/p 3 stents on June 30, 2017 at [place] Dr. 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  History of tonsillectomy  H/O heart artery stent: June 30, 2017  Knee arthropathy: left  History of total hip replacement: left  History of hernia repair      MEDICATIONS  (STANDING):  noted    MEDICATIONS  (PRN):      FAMILY HISTORY:  Family history of heart attack  Family history of pulmonary embolism: father @ 49 yr old  FH: CAD (coronary artery disease) (Sibling)      SOCIAL HISTORY:    [ ] Non-smoker  [ ] Smoker  [ ] Alcohol    Allergies    penicillin (Rash)    Intolerances    	    REVIEW OF SYSTEMS:  CONSTITUTIONAL: No fever, + weight loss, + fatigue  EYES: No eye pain, visual disturbances, or discharge  ENT:  No difficulty hearing, tinnitus, vertigo; No sinus or throat pain  NECK: No pain or stiffness  RESPIRATORY: No cough, wheezing, chills or hemoptysis; + mild Shortness of Breath  CARDIOVASCULAR: No chest pain, palpitations, passing out, dizziness, or leg swelling  GASTROINTESTINAL: No abdominal or epigastric pain. No nausea, vomiting, or hematemesis; No diarrhea or constipation. No melena or hematochezia.  GENITOURINARY: No dysuria, frequency, hematuria, or incontinence  NEUROLOGICAL: No headaches, memory loss, loss of strength, numbness, or tremors  SKIN: No itching, burning, rashes, or lesions   LYMPH Nodes: No enlarged glands  ENDOCRINE: No heat or cold intolerance; No hair loss  MUSCULOSKELETAL: No joint pain or swelling; No muscle, back, or extremity pain  PSYCHIATRIC: No depression, anxiety, mood swings, or difficulty sleeping  HEME/LYMPH: No easy bruising, or bleeding gums  ALLERGY AND IMMUNOLOGIC: No hives or eczema	    [ ] All others negative	  [ ] Unable to obtain    PHYSICAL EXAM:  T(C): 36.4 (06-05-18 @ 15:35), Max: 36.4 (06-05-18 @ 15:35)  HR: 65 (06-05-18 @ 15:35) (65 - 76)  BP: 100/60 (06-05-18 @ 15:35) (89/51 - 108/61)  RR: 18 (06-05-18 @ 15:35) (18 - 18)  SpO2: 99% (06-05-18 @ 15:35) (98% - 99%)  Wt(kg): --  I&O's Summary      Appearance: Normal	  HEENT:   Normal oral mucosa, PERRL, EOMI	  Lymphatic: No lymphadenopathy  Cardiovascular: Normal S1 S2, No JVD, + murmurs, No edema  Respiratory: Lungs clear to auscultation	  Psychiatry: A & O x 3, Mood & affect appropriate  Gastrointestinal:  Soft, Non-tender, + BS	  Skin: No rashes, No ecchymoses, No cyanosis	  Neurologic: Non-focal  Extremities: Normal range of motion, No clubbing, cyanosis or edema  Vascular: Peripheral pulses palpable 2+ bilaterally    TELEMETRY: 	    ECG:  	  RADIOLOGY:  OTHER: 	  	  LABS:	 	    CARDIAC MARKERS:                              9.3    7.2   )-----------( 223      ( 05 Jun 2018 13:11 )             28.0     06-05    140  |  103  |  36<H>  ----------------------------<  118<H>  4.5   |  25  |  0.82    Ca    9.1      05 Jun 2018 13:11    TPro  6.6  /  Alb  2.9<L>  /  TBili  0.2  /  DBili  x   /  AST  44<H>  /  ALT  35  /  AlkPhos  184<H>  06-05    proBNP:   Lipid Profile:   HgA1c:   TSH:   PT/INR - ( 05 Jun 2018 13:11 )   PT: 11.6 sec;   INR: 1.07 ratio         PTT - ( 05 Jun 2018 13:11 )  PTT:32.0 sec    PREVIOUS DIAGNOSTIC TESTING:    < from: Transthoracic Echocardiogram (04.21.18 @ 14:23) >  1. Mitral annular calcification, otherwise normal mitral  valve. Mild mitral regurgitation. Mean transmitral valve  gradient equals 4 mm Hg, consistent with mild mitral  stenosis. (HRabout 60s bpm)  2. Calcified trileaflet aortic valve with decreased  opening. Peak transaortic valve gradient equals 34 mm Hg,  mean transaortic valve gradient equals 19 mm Hg, estimated  aortic valve area equals 1.3 sqcm (by continuity equation),  aortic valve velocity time integral equals 59 cm,  consistent with moderate aortic stenosis. Aortic valve  assessment is limited by the presence of frequent PVCs and  the degree of aortic stenosis may be underestimated.  Visually, the aortic valve appears moderate-severely  stenotic. Consider additional imaging of the aortic valve  such as with additional TTE or TINO imaging if clinically  indicated.No aortic valve regurgitation seen.  3. Severely dilated left atrium.  LA volume index = 64  cc/m2.  4. Endocardium not well visualized; grossly normal left  ventricular systolic function. Septal motion consistent  with conduction defect. There is a false tendon in the LV  cavity (normal variant).  5. The right ventricle is not well visualized; right  ventricle appears enlarged with normal systolic function. (05 Jun 2018 16:41)      PERTINENT PM/SXH:   Coronary artery disease  OA (osteoarthritis) of knee  Former smoker, stopped smoking in distant past  Former smoker  Rheumatoid arthritis  Seizure disorder  Asthma  Hyperlipidemia  BPH (benign prostatic hyperplasia)  HTN (hypertension)  Esophagus cancer  Chronic allergic rhinitis  BCC (basal cell carcinoma)  Cerebrovascular accident (CVA)  Anxiety    Anastomotic leak following esophagectomy  History of tonsillectomy  H/O heart artery stent  Knee arthropathy  History of total hip replacement  History of hernia repair    SOCIAL HISTORY:   Significant other/partner:  [ ] YES  [ ] NO               Children:  [ ] YES  [ ] NO                   Hinduism/Spirituality:  Substance hx:  [ ] YES   [ ] NO                   Tobacco hx:  [ ] YES  [ ] NO                       Alcohol hx: [ ] YES  [ ] NO         Home Opioid hx:  [ ] YES  [ ] NO   Living Situation: [ ] Home  [ ] Long term care  [ ] Rehab [ ] Other    FAMILY HISTORY:  Family history of heart attack  Family history of pulmonary embolism: father @ 49 yr old  FH: CAD (coronary artery disease) (Sibling)    [ ] Family history non-contributory     BASELINE (I)ADLs (prior to admission):  [place]: [ ] total  [ ] moderate [ ] dependent    ADVANCE DIRECTIVES:    DNR   MOLST  [ ] YES [ ] NO                      [ ] Completed  Health Care Proxy [ ] YES  [ ] NO   [ ] Completed  Living Will  [ ] YES [ ] NO             [ ] Surrogate  [ ] HCP  [ ] Guardian:                                                                  Phone#:    Allergies    penicillin (Rash)    Intolerances        MEDICATIONS  (STANDING):  ALPRAZolam 0.5 milliGRAM(s) Oral two times a day  amitriptyline 10 milliGRAM(s) Oral two times a day  aspirin enteric coated 81 milliGRAM(s) Oral daily  clopidogrel Tablet 75 milliGRAM(s) Oral daily  cyanocobalamin 500 MICROGram(s) Oral daily  finasteride 5 milliGRAM(s) Oral daily  fluticasone propionate 50 MICROgram(s)/spray Nasal Spray 1 Spray(s) Both Nostrils two times a day  folic acid 1 milliGRAM(s) Oral daily  levothyroxine 25 MICROGram(s) Oral daily  midodrine 10 milliGRAM(s) Oral every 8 hours  montelukast 10 milliGRAM(s) Oral daily  phenytoin   Capsule 200 milliGRAM(s) Oral two times a day  predniSONE   Tablet 5 milliGRAM(s) Oral daily  pyridoxine 100 milliGRAM(s) Oral daily  simvastatin 20 milliGRAM(s) Oral at bedtime  sucralfate suspension 1 Gram(s) Oral four times a day  tamsulosin 0.4 milliGRAM(s) Oral at bedtime  tiotropium 18 MICROgram(s) Capsule 1 Capsule(s) Inhalation daily  trimethoprim  160 mG/sulfamethoxazole 800 mG 1 Tablet(s) Oral daily    MEDICATIONS  (PRN):  ALBUTerol    90 MICROgram(s) HFA Inhaler 1 Puff(s) Inhalation two times a day PRN Bronchospasm  sodium chloride 0.65% Nasal 1 Spray(s) Both Nostrils three times a day PRN Nasal Congestion  sodium chloride 0.65% Nasal 1 Spray(s) Both Nostrils daily PRN Nasal Congestion      PRESENT SYMPTOMS:  Source: [ ] Patient   [ ] Family   [ ] Team     Pain:                        [ ] No [ ] Yes             [ ] Mild [ ] Moderate [ ] Severe    Onset -  Location -  Duration -  Character -  Alleviating/Aggravating -  Radiation -  Timing -      Dyspnea:                [ ] No [ ] Yes             [ ] Mild [ ] Moderate [ ] Severe    Anxiety:                  [ ] No [ ] Yes             [ ] Mild [ ] Moderate [ ] Severe    Fatigue:                  [ ] No [ ] Yes             [ ] Mild [ ] Moderate [ ] Severe    Nausea:                  [ ] No [ ] Yes             [ ] Mild [ ] Moderate [ ] Severe    Loss of appetite:   [ ] No [ ] Yes             [ ] Mild [ ] Moderate [ ] Severe    Constipation:        [ ] No [ ] Yes             [ ] Mild [ ] Moderate [ ] Severe    Other Symptoms:  [ ] All other review of systems negative   [ ] Unable to obtain due to poor mentation     Karnofsky Performance Score/Palliative Performance Status Version 2:         %    PHYSICAL EXAM:  Vital Signs Last 24 Hrs  T(C): 36.6 (07 Jun 2018 13:36), Max: 36.6 (07 Jun 2018 13:36)  T(F): 97.8 (07 Jun 2018 13:36), Max: 97.8 (07 Jun 2018 13:36)  HR: 71 (07 Jun 2018 13:36) (69 - 83)  BP: 90/49 (07 Jun 2018 13:36) (90/49 - 127/72)  BP(mean): --  RR: 16 (07 Jun 2018 13:36) (16 - 16)  SpO2: 97% (07 Jun 2018 13:36) (97% - 98%) I&O's Summary    06 Jun 2018 07:01  -  07 Jun 2018 07:00  --------------------------------------------------------  IN: 240 mL / OUT: 600 mL / NET: -360 mL    07 Jun 2018 07:01  -  07 Jun 2018 14:48  --------------------------------------------------------  IN: 480 mL / OUT: 350 mL / NET: 130 mL        General:  [ ] Alert  [ ] Oriented x      [ ] Lethargic  [ ] Agitated   [ ] Cachexia   [ ] Unarousable  [ ] Verbal  [ ] Non-Verbal    HEENT:  [ ] Normal   [ ] Dry mouth   [ ] ET Tube    [ ] Trach  [ ] Oral lesions    Lungs:   [ ] Clear [ ] Tachypnea  [ ] Audible excessive secretions   [ ] Rhonchi        [ ] Right [ ] Left [ ] Bilateral  [ ] Crackles        [ ] Right [ ] Left [ ] Bilateral  [ ] Wheezing     [ ] Right [ ] Left [ ] Bilateral    Cardiovascular:  [ ] Regular [ ] Irregular [ ] Tachycardia   [ ] Bradycardia  [ ] Murmur [ ] Other    Abdomen: [ ] Soft  [ ] Distended   [ ] +BS  [ ] Non tender [ ] Tender  [ ]PEG   [ ]OGT/ NGT   Last BM:       Genitourinary: [ ] Normal [ ] Incontinent   [ ] Oliguria/Anuria   [ ] López    Musculoskeletal:  [ ] Normal   [ ] Weakness  [ ] Bedbound/Wheelchair bound [ ] Edema    Neurological: [ ] No focal deficits  [ ] Cognitive impairment  [ ] Dysphagia [ ] Dysarthria [ ] Paresis [ ] Other     Skin: [ ] Normal   [ ] Pressure ulcer(s)                  [ ] Rash    LABS:                        7.9    6.6   )-----------( 209      ( 07 Jun 2018 13:33 )             23.4     06-07    139  |  105  |  27<H>  ----------------------------<  80  4.2   |  27  |  0.62    Ca    8.3<L>      07 Jun 2018 06:16  Phos  2.1     06-07  Mg     1.9     06-07            Shock: [ ] Septic [ ] Cardiogenic [ ] Neurologic [ ] Hypovolemic  Vasopressors x   Inotrophs x     Protein Calorie Malnutrition: [ ] Mild [ ] Moderate [ ] Severe    Oral Intake: [ ] Unable/mouth care only [ ] Minimal [ ] Moderate [ ] Full Capability  Diet: [ ] NPO [ ] Tube feeds [ ] TPN [ ] Other     RADIOLOGY & ADDITIONAL STUDIES:    REFERRALS:   [ ] Chaplaincy  [ ] Hospice  [ ] Child Life  [ ] Social Work  [ ] Case management [ ] Holistic Therapy HPI:  CHIEF COMPLAINT:Patient is a 82y old  Male who presents with a chief complaint of     HPI:Patient presents with chief complaint of nasal bleeding and spitting up after presenting from ENT office. Patient has also been having black stools. Yesterday he saw ENT as well with removal of a pyogenic granuloma.  	Patient has had dark stools x 1 day. Patient has been spitting up blood for > 1 month. No chest pain, shortness of breath. Patient has been fatigued.   	Patient is on midodrine.  	Patient normally walks with a walker, was able to walk yesterday but not today.  	PT is on plavix and aspirin for coronary artery disease with stents.          PT and his family voiced concerns about communication with staff in the hospital  See full account in GOC note           MEDICATIONS  (STANDING):  noted    MEDICATIONS  (PRN):      FAMILY HISTORY:       	       PHYSICAL EXAM:  T(C): 36.4 (06-05-18 @ 15:35), Max: 36.4 (06-05-18 @ 15:35)  HR: 65 (06-05-18 @ 15:35) (65 - 76)  BP: 100/60 (06-05-18 @ 15:35) (89/51 - 108/61)  RR: 18 (06-05-18 @ 15:35) (18 - 18)  SpO2: 99% (06-05-18 @ 15:35) (98% - 99%)  Wt(kg): --  I&O's Summary      Appearance: Normal	  HEENT:   Normal oral mucosa, PERRL, EOMI	  Lymphatic: No lymphadenopathy  Cardiovascular: Normal S1 S2, No JVD, + murmurs, No edema  Respiratory: Lungs clear to auscultation	  Psychiatry: A & O x 3, Mood & affect appropriate  Gastrointestinal:  Soft, Non-tender, + BS	  Skin: No rashes, No ecchymoses, No cyanosis	  Neurologic: Non-focal  Extremities: Normal range of motion, No clubbing, cyanosis or edema  Vascular: Peripheral pulses palpable 2+ bilaterally    TELEMETRY: 	    ECG:  	  RADIOLOGY:  OTHER: 	  	  LABS:	 	    CARDIAC MARKERS:                              9.3    7.2   )-----------( 223      ( 05 Jun 2018 13:11 )             28.0     06-05    140  |  103  |  36<H>  ----------------------------<  118<H>  4.5   |  25  |  0.82    Ca    9.1      05 Jun 2018 13:11    TPro  6.6  /  Alb  2.9<L>  /  TBili  0.2  /  DBili  x   /  AST  44<H>  /  ALT  35  /  AlkPhos  184<H>  06-05    proBNP:   Lipid Profile:   HgA1c:   TSH:   PT/INR - ( 05 Jun 2018 13:11 )   PT: 11.6 sec;   INR: 1.07 ratio         PTT - ( 05 Jun 2018 13:11 )  PTT:32.0 sec    PREVIOUS DIAGNOSTIC TESTING:    < from: Transthoracic Echocardiogram (04.21.18 @ 14:23) >  1. Mitral annular calcification, otherwise normal mitral  valve. Mild mitral regurgitation. Mean transmitral valve  gradient equals 4 mm Hg, consistent with mild mitral  stenosis. (HRabout 60s bpm)  2. Calcified trileaflet aortic valve with decreased  opening. Peak transaortic valve gradient equals 34 mm Hg,  mean transaortic valve gradient equals 19 mm Hg, estimated  aortic valve area equals 1.3 sqcm (by continuity equation),  aortic valve velocity time integral equals 59 cm,  consistent with moderate aortic stenosis. Aortic valve  assessment is limited by the presence of frequent PVCs and  the degree of aortic stenosis may be underestimated.  Visually, the aortic valve appears moderate-severely  stenotic. Consider additional imaging of the aortic valve  such as with additional TTE or TINO imaging if clinically  indicated.No aortic valve regurgitation seen.  3. Severely dilated left atrium.  LA volume index = 64  cc/m2.  4. Endocardium not well visualized; grossly normal left  ventricular systolic function. Septal motion consistent  with conduction defect. There is a false tendon in the LV  cavity (normal variant).  5. The right ventricle is not well visualized; right  ventricle appears enlarged with normal systolic function. (05 Jun 2018 16:41)      PERTINENT PM/SXH:   Coronary artery disease  OA (osteoarthritis) of knee  Former smoker, stopped smoking in distant past  Former smoker  Rheumatoid arthritis  Seizure disorder  Asthma  Hyperlipidemia  BPH (benign prostatic hyperplasia)  HTN (hypertension)  Esophagus cancer  Chronic allergic rhinitis  BCC (basal cell carcinoma)  Cerebrovascular accident (CVA)  Anxiety    Anastomotic leak following esophagectomy  History of tonsillectomy  H/O heart artery stent  Knee arthropathy  History of total hip replacement  History of hernia repair    SOCIAL HISTORY:   Significant other/partner:  [x ] YES  [ ] NO               Children:  [x ] YES  [ ] NO                   Mormon/Spirituality:  Substance hx:  [ ] YES   [ x] NO                   Tobacco hx:  [ ] YES  x[x ] NO                       Alcohol hx: [ ] YES  [ x] NO         Home Opioid hx:  [ ] YES  x] NO   Living Situation: [ x] Home  [ ] Long term care  [ ] Rehab [ ] Other    FAMILY HISTORY:  Family history of heart attack  Family history of pulmonary embolism: father @ 49 yr old  FH: CAD (coronary artery disease) (Sibling)    [ x] Family history non-contributory     BASELINE (I)ADLs (prior to admission):  [place]: [ ] total  [ x] moderate [ ] dependent    ADVANCE DIRECTIVES:    DNR Now  MOLST  [ ] YES [x ] NO                      [ ] Completed  Health Care Proxy [ ] YES  [ ] NO   [ ] Completed  Living Will  [ ] YES [ ] NO             [ ] Surrogate  [ ] HCP  [ ] Guardian:                                                                  Phone#:    Allergies    penicillin (Rash)    Intolerances        MEDICATIONS  (STANDING):  ALPRAZolam 0.5 milliGRAM(s) Oral two times a day  amitriptyline 10 milliGRAM(s) Oral two times a day  aspirin enteric coated 81 milliGRAM(s) Oral daily  clopidogrel Tablet 75 milliGRAM(s) Oral daily  cyanocobalamin 500 MICROGram(s) Oral daily  finasteride 5 milliGRAM(s) Oral daily  fluticasone propionate 50 MICROgram(s)/spray Nasal Spray 1 Spray(s) Both Nostrils two times a day  folic acid 1 milliGRAM(s) Oral daily  levothyroxine 25 MICROGram(s) Oral daily  midodrine 10 milliGRAM(s) Oral every 8 hours  montelukast 10 milliGRAM(s) Oral daily  phenytoin   Capsule 200 milliGRAM(s) Oral two times a day  predniSONE   Tablet 5 milliGRAM(s) Oral daily  pyridoxine 100 milliGRAM(s) Oral daily  simvastatin 20 milliGRAM(s) Oral at bedtime  sucralfate suspension 1 Gram(s) Oral four times a day  tamsulosin 0.4 milliGRAM(s) Oral at bedtime  tiotropium 18 MICROgram(s) Capsule 1 Capsule(s) Inhalation daily  trimethoprim  160 mG/sulfamethoxazole 800 mG 1 Tablet(s) Oral daily    MEDICATIONS  (PRN):  ALBUTerol    90 MICROgram(s) HFA Inhaler 1 Puff(s) Inhalation two times a day PRN Bronchospasm  sodium chloride 0.65% Nasal 1 Spray(s) Both Nostrils three times a day PRN Nasal Congestion  sodium chloride 0.65% Nasal 1 Spray(s) Both Nostrils daily PRN Nasal Congestion      PRESENT SYMPTOMS:  Source: [x ] Patient   [ ] Family   [ ] Team     Pain:                        [x ] No [ ] Yes             [ ] Mild [ ] Moderate [ ] Severe    Onset -  Location -  Duration -  Character -  Alleviating/Aggravating -  Radiation -  Timing -      Dyspnea:                [x ] No [ ] Yes             [ ] Mild [ ] Moderate [ ] Severe    Anxiety:                  [x ] No [ ] Yes             [ ] Mild [ ] Moderate [ ] Severe    Fatigue:                  [ ] No [x ] Yes             [ ] Mild [ x] Moderate [ ] Severe    Nausea:                  [x ] No [ ] Yes             [ ] Mild [ ] Moderate [ ] Severe    Loss of appetite:   [ x] No [ ] Yes             [ ] Mild [ ] Moderate [ ] Severe    Constipation:        [x ] No [ ] Yes             [ ] Mild [ ] Moderate [ ] Severe    Other Symptoms:  [x ] All other review of systems negative   [ ] Unable to obtain due to poor mentation     Karnofsky Performance Score/Palliative Performance Status Version 2: 40         %    PHYSICAL EXAM:  Vital Signs Last 24 Hrs  T(C): 36.6 (07 Jun 2018 13:36), Max: 36.6 (07 Jun 2018 13:36)  T(F): 97.8 (07 Jun 2018 13:36), Max: 97.8 (07 Jun 2018 13:36)  HR: 71 (07 Jun 2018 13:36) (69 - 83)  BP: 90/49 (07 Jun 2018 13:36) (90/49 - 127/72)  BP(mean): --  RR: 16 (07 Jun 2018 13:36) (16 - 16)  SpO2: 97% (07 Jun 2018 13:36) (97% - 98%) I&O's Summary    06 Jun 2018 07:01  -  07 Jun 2018 07:00  --------------------------------------------------------  IN: 240 mL / OUT: 600 mL / NET: -360 mL    07 Jun 2018 07:01  -  07 Jun 2018 14:48  --------------------------------------------------------  IN: 480 mL / OUT: 350 mL / NET: 130 mL        General:  [ x] Alert  [ ] Oriented x      [ ] Lethargic  [ ] Agitated   [ ] Cachexia   [ ] Unarousable  [ ] Verbal  [ ] Non-Verbal    HEENT:  [ ] Normal   [ x] Dry mouth   [ ] ET Tube    [ ] Trach  [ ] Oral lesions    Lungs:   [x ] Clear [ ] Tachypnea  [ ] Audible excessive secretions   [ ] Rhonchi        [ ] Right [ ] Left [ ] Bilateral  [ ] Crackles        [ ] Right [ ] Left [ ] Bilateral  [ ] Wheezing     [ ] Right [ ] Left [ ] Bilateral    Cardiovascular:  [x ] Regular [ ] Irregular [ ] Tachycardia   [ ] Bradycardia  [ ] Murmur [ ] Other    Abdomen: [ x] Soft  [ ] Distended   [ ] +BS  [ ] Non tender [ ] Tender  [ ]PEG   [ ]OGT/ NGT   Last BM:       Genitourinary: [x ] Normal [ ] Incontinent   [ ] Oliguria/Anuria   [ ] López    Musculoskeletal:  [ ] Normal   [x ] Weakness  [ ] Bedbound/Wheelchair bound [ ] Edema    Neurological: [x ] No focal deficits  [ ] Cognitive impairment  [ ] Dysphagia [ ] Dysarthria [ ] Paresis [ ] Other     Skin: [ x] Normal   [ ] Pressure ulcer(s)                  [ ] Rash    LABS:                        7.9    6.6   )-----------( 209      ( 07 Jun 2018 13:33 )             23.4     06-07    139  |  105  |  27<H>  ----------------------------<  80  4.2   |  27  |  0.62    Ca    8.3<L>      07 Jun 2018 06:16  Phos  2.1     06-07  Mg     1.9     06-07            Shock: [ ] Septic [ ] Cardiogenic [ ] Neurologic [ ] Hypovolemic  Vasopressors x   Inotrophs x     Protein Calorie Malnutrition: [ ] Mild [ ] Moderate [ ] Severe    Oral Intake: [ ] Unable/mouth care only [ ] Minimal [ ] Moderate [ ] Full Capability  Diet: [ ] NPO [ ] Tube feeds [ ] TPN [ ] Other     RADIOLOGY & ADDITIONAL STUDIES:    REFERRALS:   [ ] Chaplaincy  [ ] Hospice  [ ] Child Life  [ ] Social Work  [ ] Case management [ ] Holistic Therapy

## 2018-06-07 NOTE — DISCHARGE NOTE ADULT - SECONDARY DIAGNOSIS.
Anemia H/O heart artery stent Orthostatic hypotension Rheumatoid arthritis Asthma BPH (benign prostatic hyperplasia)

## 2018-06-07 NOTE — DISCHARGE NOTE ADULT - PROVIDER TOKENS
TOKEN:'[id number]',TOKEN:'[id number]' TOKEN:'[id number]',TOKEN:'[id number]',[id number]' TOKEN:'[id number]',[id number]',TOKEN:'[id number]'

## 2018-06-07 NOTE — DISCHARGE NOTE ADULT - CARE PROVIDER_API CALL
Leif Santiago), Cardiovascular Disease; Internal Medicine  [address] [place] [address]  Phone: [phone]  Fax: [phone]    Carson Domínguez), Gastroenterology; Internal Medicine  [address] [place] [address]  Phone: [phone]  Fax: [phone] Leif Santiago), Cardiovascular Disease; Internal Medicine  [address] [place] [address]  Phone: [phone]  Fax: [phone]    Carson Domínguez (), Gastroenterology; Internal Medicine  [address] [place], [place] [address]  [place] [address]  Phone: [phone]  Fax: [phone]    Andrew Montgomery), Otolaryngology  [address] [place] [address]  Phone: [phone]  Fax: [phone] Leif Santiago), Cardiovascular Disease; Internal Medicine  [address] [place] [address]  Phone: [phone]  Fax: [phone]    Carson Domínguez (), Gastroenterology; Internal Medicine  [address] [facility] [address]  [place] [address]  Phone: [phone]  Fax: [phone]    Andrew Montgomery), Otolaryngology  [address] [facility]  [address]  [place] [address]  Phone: [phone]  Fax: [phone]    Keo Trevino), Internal Medicine; Medical Oncology  [address] [facility]  [address]  [place] [address]  Phone: [phone]  Fax: [phone]

## 2018-06-07 NOTE — CONSULT NOTE ADULT - ASSESSMENT
82y with recent nasal procedure- cauterization and removal of granuloma near posterior aspect of right nasal septal perf, in out pt office yesterday now with a few episodes of hemoptysis, anemia and black stool today. Merocel packing was place in right nare in office today- no active bleeding.  Pt h/h 9.3/28, with labile bp's.
chart reviewed.  full consult to follow
A/P:      RUE wound- Medihoney & ALlevyn  Buttock abrasion - CAViLON  BLE elevation  Abx per Medicine/ ID  Moisturize intact skin w/ SWEEN cream BID  con't Nutrition (as tolerated), Nutrition Consult  con't Offloading   con't Pericare  Care as per medicine will follow w/ you  Upon discharge f/u as outpatient at [facility]ound [facility] [address] [facility] [phone]  D/w team & attng  Thank you for this consult  Scarlet Polanco PA-C CWS [id number]
82 espohageal cancer s/p eBRT and chemo a/w bridgett consulted for GOC

## 2018-06-07 NOTE — CONSULT NOTE ADULT - PROBLEM SELECTOR RECOMMENDATION 9
s/p RT and chemo  No related complaints of worsening issues  Residual dysphagia
- gram-positive abx coverage for duration of packing placement  - Continue with Nasal Packing   - Strict blood pressure control.  - Nasal saline, 2 sprays to both nares 4 times a day  - Avoid nasal trauma; no nose rubbing, blowing or manipulating nasal packing.  Sneeze with mouth open and pinching nares.  - Avoid bending with head blow the waist.    -No heavy lifting.  - Pending attending to see in am for further carroll's

## 2018-06-07 NOTE — CONSULT NOTE ADULT - PROBLEM SELECTOR RECOMMENDATION 4
Performed service recovery  [employer] Performed service recovery  [employer]  Spoke with case management

## 2018-06-07 NOTE — PROGRESS NOTE ADULT - ASSESSMENT
82y with recent nasal procedure- cauterization and removal of granuloma near posterior aspect of right nasal septal perf, in out pt office 3 days ago now with a few episodes of hemoptysis, anemia and black stool today. Merocel packing was place in right nare in office yesterday- no active bleeding. 82y with recent nasal procedure- cauterization and removal of granuloma near posterior aspect of right nasal septal perf, in out pt office 3 days ago now with a few episodes of hemoptysis, anemia and black stool. Merocel packing in place- no active bleeding.

## 2018-06-07 NOTE — PROGRESS NOTE ADULT - ATTENDING COMMENTS
bp stable, hgb stable and no further epistaxis/hemoptysis. ok for d/c and f/u with dr. de los santos in office next week to remove pack. d/c on abx prophylaxis

## 2018-06-07 NOTE — DISCHARGE NOTE ADULT - MEDICATION SUMMARY - MEDICATIONS TO TAKE
I will START or STAY ON the medications listed below when I get home from the hospital:    finasteride 5 mg oral tablet  -- 1 tab(s) by mouth once a day  -- Indication: For BPH (benign prostatic hyperplasia)    predniSONE 5 mg oral tablet  -- 1 tab(s) by mouth once a day  -- Indication: For Rheumatoid arthritis    aspirin 81 mg oral delayed release tablet  -- 1 tab(s) by mouth once a day  -- Indication: For CAD/stents    tamsulosin 0.4 mg oral capsule  -- 1 cap(s) by mouth once a day  -- Indication: For BPH (benign prostatic hyperplasia)    phenytoin 200 mg oral capsule, extended release  -- 1 cap(s) by mouth 2 times a day    Note - patient takes BRAND dilantin  -- Indication: For seizure d/o    amitriptyline 10 mg oral tablet  -- 1 tab(s) by mouth 2 times a day  -- Indication: For Anxiety    ezetimibe 10 mg oral tablet  -- 1 tab(s) by mouth once a day  -- Indication: For HLD    simvastatin 40 mg oral tablet  -- 1 tab(s) by mouth once a day  -- Indication: For HLD    clopidogrel 75 mg oral tablet  -- 1 tab(s) by mouth once a day MDD:1  -- Indication: For CAD/stents    ALPRAZolam 0.25 mg oral tablet  -- 2 tab(s) by mouth 2 times a day  -- Indication: For Anxiety    Spiriva Respimat 2.5 mcg/inh inhalation aerosol  -- 1 or 2 puff(s) inhaled once a day, As Needed  -- Indication: For Asthma    ProAir HFA 90 mcg/inh inhalation aerosol  -- 2 puff(s) inhaled , As Needed  -- Indication: For Asthma    montelukast 10 mg oral tablet  -- 1 tab(s) by mouth once a day  -- Indication: For Asthma    midodrine 10 mg oral tablet  -- 1 tab(s) by mouth 3 times a day  -- Indication: For Orthostatic hypotension    sucralfate 1 g/10 mL oral suspension  -- 10 milliliter(s) by mouth 4 times a day  -- Indication: For gastritis    sodium chloride 0.65% nasal spray  -- 1 spray(s) into nose 3 times a day, As Needed  -- Indication: For Nasopharyngeal bleed    fluticasone 50 mcg/inh nasal spray  -- 1 spray(s) into nose 2 times a day  -- Indication: For Nasopharyngeal bleed    sulfamethoxazole-trimethoprim 800 mg-160 mg oral tablet  -- 1 tab(s) by mouth once a day x 5 days  -- Indication: For Nasopharyngeal bleed/prophylaxis    levothyroxine 25 mcg (0.025 mg) oral tablet  -- 1 tab(s) by mouth on ODD numbered days; and 2 tab(s) by mouth on EVEN numbered days  -- Indication: For Hypothyroidism    Vitamin B6 100 mg oral tablet  -- Indication: For prophylaxis    folic acid 0.4 mg oral tablet  -- Indication: For supplement    cyanocobalamin 500 mcg oral tablet  -- 1 tab(s) by mouth once a day  -- Indication: For supplement I will START or STAY ON the medications listed below when I get home from the hospital:    finasteride 5 mg oral tablet  -- 1 tab(s) by mouth once a day  -- Indication: For BPH (benign prostatic hyperplasia)    predniSONE 5 mg oral tablet  -- 1 tab(s) by mouth once a day  -- Indication: For Rheumatoid arthritis    aspirin 81 mg oral delayed release tablet  -- 1 tab(s) by mouth once a day  -- Indication: For CAD/stents    tamsulosin 0.4 mg oral capsule  -- 1 cap(s) by mouth once a day  -- Indication: For BPH (benign prostatic hyperplasia)    phenytoin 200 mg oral capsule, extended release  -- 1 cap(s) by mouth 2 times a day    Note - patient takes BRAND dilantin  -- Indication: For seizure d/o    amitriptyline 10 mg oral tablet  -- 1 tab(s) by mouth 2 times a day  -- Indication: For Anxiety    ezetimibe 10 mg oral tablet  -- 1 tab(s) by mouth once a day  -- Indication: For HLD    simvastatin 40 mg oral tablet  -- 1 tab(s) by mouth once a day  -- Indication: For HLD    clopidogrel 75 mg oral tablet  -- 1 tab(s) by mouth once a day MDD:1  -- Indication: For CAD/stents    ALPRAZolam 0.25 mg oral tablet  -- 2 tab(s) by mouth 2 times a day  -- Indication: For Anxiety    Spiriva Respimat 2.5 mcg/inh inhalation aerosol  -- 1 or 2 puff(s) inhaled once a day, As Needed  -- Indication: For Asthma    ProAir HFA 90 mcg/inh inhalation aerosol  -- 2 puff(s) inhaled , As Needed  -- Indication: For Asthma    montelukast 10 mg oral tablet  -- 1 tab(s) by mouth once a day  -- Indication: For Asthma    midodrine 10 mg oral tablet  -- 1 tab(s) by mouth 3 times a day  -- Indication: For Orthostatic hypotension    sucralfate 1 g/10 mL oral suspension  -- 10 milliliter(s) by mouth 4 times a day  -- Indication: For esophageal cancer    sodium chloride 0.65% nasal spray  -- 1 spray(s) into nose 3 times a day, As Needed  -- Indication: For Nasopharyngeal bleed    fluticasone 50 mcg/inh nasal spray  -- 1 spray(s) into nose 2 times a day  -- Indication: For Nasopharyngeal bleed    sulfamethoxazole-trimethoprim 800 mg-160 mg oral tablet  -- 1 tab(s) by mouth once a day x 5 days  -- Indication: For Nasopharyngeal bleed/prophylaxis    levothyroxine 25 mcg (0.025 mg) oral tablet  -- 1 tab(s) by mouth on ODD numbered days; and 2 tab(s) by mouth on EVEN numbered days  -- Indication: For Hypothyroidism    Vitamin B6 100 mg oral tablet  -- Indication: For prophylaxis    folic acid 0.4 mg oral tablet  -- Indication: For supplement    cyanocobalamin 500 mcg oral tablet  -- 1 tab(s) by mouth once a day  -- Indication: For supplement I will START or STAY ON the medications listed below when I get home from the hospital:    finasteride 5 mg oral tablet  -- 1 tab(s) by mouth once a day  -- Indication: For BPH (benign prostatic hyperplasia)    predniSONE 5 mg oral tablet  -- 1 tab(s) by mouth once a day  -- Indication: For Rheumatoid arthritis    aspirin 81 mg oral delayed release tablet  -- 1 tab(s) by mouth once a day  -- Indication: For CAD/stents    tamsulosin 0.4 mg oral capsule  -- 1 cap(s) by mouth once a day  -- Indication: For BPH (benign prostatic hyperplasia)    phenytoin 200 mg oral capsule, extended release  -- 1 cap(s) by mouth 2 times a day    Note - patient takes BRAND dilantin  -- Indication: For seizure d/o    amitriptyline 10 mg oral tablet  -- 1 tab(s) by mouth 2 times a day  -- Indication: For Anxiety    ezetimibe 10 mg oral tablet  -- 1 tab(s) by mouth once a day  -- Indication: For HLD    simvastatin 40 mg oral tablet  -- 1 tab(s) by mouth once a day  -- Indication: For HLD    clopidogrel 75 mg oral tablet  -- 1 tab(s) by mouth once a day MDD:1  -- Indication: For CAD/stents    ALPRAZolam 0.25 mg oral tablet  -- 2 tab(s) by mouth 2 times a day  -- Indication: For Anxiety    Spiriva Respimat 2.5 mcg/inh inhalation aerosol  -- 1 or 2 puff(s) inhaled once a day, As Needed  -- Indication: For Asthma    ProAir HFA 90 mcg/inh inhalation aerosol  -- 2 puff(s) inhaled , As Needed  -- Indication: For Asthma    montelukast 10 mg oral tablet  -- 1 tab(s) by mouth once a day  -- Indication: For Asthma    midodrine 10 mg oral tablet  -- 1 tab(s) by mouth 3 times a day  -- Indication: For Orthostatic hypotension    sucralfate 1 g oral tablet  -- 1 tab(s) by mouth 4 times a day   -- Do not take dairy products, antacids, or iron preparations within one hour of this medication.  It is very important that you take or use this exactly as directed.  Do not skip doses or discontinue unless directed by your doctor.  Take medication on an empty stomach 1 hour before or 2 to 3 hours after a meal unless otherwise directed by your doctor.    -- Indication: For Malignant neoplasm of esophagus, unspecified location    sodium chloride 0.65% nasal spray  -- 1 spray(s) into nose 3 times a day, As Needed  -- Indication: For Nasopharyngeal bleed    fluticasone 50 mcg/inh nasal spray  -- 1 spray(s) into nose 2 times a day  -- Indication: For Nasopharyngeal bleed    sulfamethoxazole-trimethoprim 800 mg-160 mg oral tablet  -- 1 tab(s) by mouth once a day x 5 days  -- Indication: For Nasopharyngeal bleed/prophylaxis    levothyroxine 25 mcg (0.025 mg) oral tablet  -- 1 tab(s) by mouth on ODD numbered days; and 2 tab(s) by mouth on EVEN numbered days  -- Indication: For Hypothyroidism    Vitamin B6 100 mg oral tablet  -- Indication: For prophylaxis    folic acid 0.4 mg oral tablet  -- Indication: For supplement    cyanocobalamin 500 mcg oral tablet  -- 1 tab(s) by mouth once a day  -- Indication: For supplement

## 2018-06-07 NOTE — GOALS OF CARE CONVERSATION - PERSONAL ADVANCE DIRECTIVE - CONVERSATION DETAILS
The patient is adamant about going home.  The daughter noted substantial caregiver burden in the wife.  Most of the conversation surrounded customer service and communication issues.  The have, purportedly Deepak, visiting nurses, privately hired PT, and a .  They are interested in more services  He feels marked improvement with transfusions and provide him with sufficient energy to engage in what he feels would be an adequate quality of life. 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  He hopes for transfusions once or twice a week, which he will get as an outpatient.   He doesn't want to burden his family if he worsens  He felt that being resuscitated would impact his quality of life.  Additionally, he had been intubated once before, and does not want to be intubated again          GILMAR completed  Placed in the chart  DNR/DNI  Will accept IV fluid and antibiotics  Please send to hospital if needed    Time spent 45 Min

## 2018-06-07 NOTE — PROGRESS NOTE ADULT - SUBJECTIVE AND OBJECTIVE BOX
Patient is a 82y old Male with PMHx of CAD on plavix who presented to the ED on 6/5/18 with hemoptysis and continues to have episodes of hemoptysis at this time, his last episode being last night at 1900.  Pt was seen in out-pt ENT office 3 days ago with Dr. Montgomery for cauterization and removal of granuloma near posterior aspect of septal perf in the right nare and a dissolvable packing was placed b/l. He was advised to f/u if he had any bleeding.  During office with Dr. Montgomery, a merocel packing was placed in right nare, however, pt became hypotensive and weak and was advised to go to hospital. Pt denies any fevers, chills, dysphagia, odynophagia, sob, changes in voice, epistaxis overnight, recent nasal trauma or changes in facial sensation. Patient's blood pressure is controlled.    Vital Signs Last 24 Hrs  T(C): 36.5 (07 Jun 2018 04:20), Max: 36.5 (07 Jun 2018 04:20)  T(F): 97.7 (07 Jun 2018 04:20), Max: 97.7 (07 Jun 2018 04:20)  HR: 83 (07 Jun 2018 04:20) (69 - 83)  BP: 108/67 (07 Jun 2018 04:20) (88/45 - 127/72)  BP(mean): --  RR: 16 (07 Jun 2018 04:20) (16 - 18)  SpO2: 98% (07 Jun 2018 04:20) (93% - 98%)    PHYSICAL EXAM:  Gen: NAD, well-developed  Head: Normocephalic, Atraumatic  Eyes: no scleral injection  Nose: Right nare with nasal Merocel packing and no active bleeding.  Left nare dry, patent, with no active bleeding.  Mouth: Mucosa moist, tongue/uvula midline, oropharynx clear  Neck: Flat, supple, no lymphadenopathy, trachea midline, no masses  Resp: no stridor  CV: no peripheral edema/cyanosis    LABS:                        8.8    5.5   )-----------( 199      ( 06 Jun 2018 20:11 )             25.9 Patient is a 82y old Male with PMHx of CAD on plavix who presented to the ED on 6/5/18 with hemoptysis.  Pt was seen in out-pt ENT office 3 days ago with Dr. Montgomery for cauterization and removal of granuloma near posterior aspect of septal perf in the right nare and a dissolvable packing was placed b/l. He was advised to f/u if he had any bleeding.  During office f/u next day with Dr. Montgomery, a merocel packing was placed in right nare, however, pt became hypotensive and weak and was advised to go to hospital. Pt denies any fevers, chills, dysphagia, odynophagia, sob, changes in voice, epistaxis overnight, recent nasal trauma or changes in facial sensation. Patient's blood pressure is controlled.    Vital Signs Last 24 Hrs  T(C): 36.5 (07 Jun 2018 04:20), Max: 36.5 (07 Jun 2018 04:20)  T(F): 97.7 (07 Jun 2018 04:20), Max: 97.7 (07 Jun 2018 04:20)  HR: 83 (07 Jun 2018 04:20) (69 - 83)  BP: 108/67 (07 Jun 2018 04:20) (88/45 - 127/72)  BP(mean): --  RR: 16 (07 Jun 2018 04:20) (16 - 18)  SpO2: 98% (07 Jun 2018 04:20) (93% - 98%)    PHYSICAL EXAM:  Gen: NAD, well-developed  Head: Normocephalic, Atraumatic  Eyes: no scleral injection  Nose: Right nare with nasal Merocel packing and no active bleeding.  Left nare dry, patent, with no active bleeding.  Mouth: Mucosa moist, tongue/uvula midline, oropharynx clear  Neck: Flat, supple, no lymphadenopathy, trachea midline, no masses  Resp: no stridor  CV: no peripheral edema/cyanosis    LABS:                        8.8    5.5   )-----------( 199      ( 06 Jun 2018 20:11 )             25.9

## 2018-06-07 NOTE — DISCHARGE NOTE ADULT - HOME CARE AGENCY
[facility] care  start of care 1-2 days after discharge they will call to arrange visit

## 2018-06-07 NOTE — DISCHARGE NOTE ADULT - HOSPITAL COURSE
Patient presents with chief complaint of nasal bleeding and spitting up after presenting from ENT office. Patient has also been having black stools. Yesterday he saw ENT as well with removal of a pyogenic granuloma.  	Patient has had dark stools x 1 day. Patient has been spitting up blood for > 1 month. No chest pain, shortness of breath. Patient has been fatigued.   	Patient is on midodrine.  	Patient normally walks with a walker, was able to walk yesterday but not today.  	PT is on plavix and aspirin for coronary artery disease with stents. Patient presents with chief complaint of nasal bleeding and spitting up after presenting from ENT office. Patient has also been having black stools. Yesterday he saw ENT as well with removal of a pyogenic granuloma.  	Patient has had dark stools x 1 day. Patient has been spitting up blood for > 1 month. No chest pain, shortness of breath. Patient has been fatigued.   	Patient is on midodrine.  	Patient normally walks with a walker, was able to walk yesterday but not today.  	PT is on plavix and aspirin for coronary artery disease with stents.  He was transfused one unit of PRBC, seen by ENT and a nasal packing was placed and removed today and cleared for discharge. Also seen by GI, Psych, Palliative care, wound care, nutrition services. Patient presents with chief complaint of nasal bleeding and spitting up after presenting from ENT office. Patient has also been having black stools. Yesterday he saw ENT as well with removal of a pyogenic granuloma.  	Patient has had dark stools x 1 day. Patient has been spitting up blood for > 1 month. No chest pain, shortness of breath. Patient has been fatigued.   	Patient is on midodrine.  	Patient normally walks with a walker, was able to walk yesterday but not today.  	PT is on plavix and aspirin for coronary artery disease with stents.  He was transfused one unit of PRBC, seen by ENT and a nasal packing was placed and kept it in place until today and he will go home with this per ENT , will follow up with Dr Montgomery (ENT) and cleared for discharge. Also seen by GI, Psych, Palliative care, wound care, nutrition services. Patient presents with chief complaint of nasal bleeding and spitting up after presenting from ENT office. Patient has also been having black stools. Yesterday he saw ENT as well with removal of a pyogenic granuloma.  	Patient has had dark stools x 1 day. Patient has been spitting up blood for > 1 month. No chest pain, shortness of breath. Patient has been fatigued.   	Patient is on midodrine.  	Patient normally walks with a walker, was able to walk yesterday but not today.  	PT is on plavix and aspirin for coronary artery disease with stents.  He was transfused one unit of PRBC, seen by ENT and a nasal packing was placed and kept it in place until today and he will go home with this per ENT , will follow up with Dr Montgomery (ENT) and cleared for discharge. Also seen by GI, Psych, Palliative care, wound care, nutrition services. His right elbow wound is stable, was followed by wound care team and he will be followed by wound care nurse at home. He has an appointment with his hm/onc (Dr Trevino, [place])  coming Monday. Paged him this and left message. Patient presents with chief complaint of nasal bleeding and spitting up after presenting from ENT office. Patient has also been having black stools. Yesterday he saw ENT as well with removal of a pyogenic granuloma.  	Patient has had dark stools x 1 day. Patient has been spitting up blood for > 1 month. No chest pain, shortness of breath. Patient has been fatigued.   	Patient is on midodrine.  	Patient normally walks with a walker, was able to walk yesterday but not today.  	PT is on plavix and aspirin for coronary artery disease with stents.  He was transfused one unit of PRBC, seen by ENT and a nasal packing was placed and kept it in place until today and he will go home with this per ENT , will follow up with Dr Montgomery (ENT) and cleared for discharge. Also seen by GI, Psych, Palliative care, wound care, nutrition services. His right elbow wound is stable, was followed by wound care team and he will be followed by wound care nurse at home. He has an appointment with his hm/onc (Dr Trevino, [facility])  coming Monday. Paged him this am and left message.  Wound care management as follows--RUE wound- Medihoney & ALlevyn  Buttock abrasion - CAViLON  BLE elevation  Abx per Medicine/ ID  Moisturize intact skin w/ SWEEN cream BID  con't Nutrition (as tolerated), Nutrition Consult  con't Offloading   con't Pericare  Care as per medicine will follow w/ you  Upon discharge f/u as outpatient at Wound Center [address] [place] [address] [phone]

## 2018-06-07 NOTE — PROGRESS NOTE ADULT - SUBJECTIVE AND OBJECTIVE BOX
MEJIA MCCORDBA:[id number],   82yMale followed for:  penicillin (Rash)    PAST MEDICAL & SURGICAL HISTORY:  Coronary artery disease: s/p 3 stents on June 30, 2017 at [facility] Dr. Lavelle Reid  OA (osteoarthritis) of knee: right  Former smoker, stopped smoking in distant past  Rheumatoid arthritis  Seizure disorder: 1 episode approximately 15 yrs ago  Asthma  Hyperlipidemia  BPH (benign prostatic hyperplasia)  HTN (hypertension)  Esophagus cancer  Chronic allergic rhinitis  BCC (basal cell carcinoma): skin  Cerebrovascular accident (CVA)  Anxiety  Anastomotic leak following esophagectomy: [place] [facility] esophagectomy  History of tonsillectomy  H/O heart artery stent: June 30, 2017  Knee arthropathy: left  History of total hip replacement: left  History of hernia repair    FAMILY HISTORY:  Family history of heart attack  Family history of pulmonary embolism: father @ 49 yr old  FH: CAD (coronary artery disease) (Sibling)    MEDICATIONS  (STANDING):  ALPRAZolam 0.5 milliGRAM(s) Oral two times a day  amitriptyline 10 milliGRAM(s) Oral two times a day  aspirin enteric coated 81 milliGRAM(s) Oral daily  clopidogrel Tablet 75 milliGRAM(s) Oral daily  cyanocobalamin 500 MICROGram(s) Oral daily  finasteride 5 milliGRAM(s) Oral daily  fluticasone propionate 50 MICROgram(s)/spray Nasal Spray 1 Spray(s) Both Nostrils two times a day  folic acid 1 milliGRAM(s) Oral daily  levothyroxine 25 MICROGram(s) Oral daily  midodrine 10 milliGRAM(s) Oral every 8 hours  montelukast 10 milliGRAM(s) Oral daily  phenytoin   Capsule 200 milliGRAM(s) Oral two times a day  predniSONE   Tablet 5 milliGRAM(s) Oral daily  pyridoxine 100 milliGRAM(s) Oral daily  simvastatin 20 milliGRAM(s) Oral at bedtime  sucralfate suspension 1 Gram(s) Oral four times a day  tamsulosin 0.4 milliGRAM(s) Oral at bedtime  tiotropium 18 MICROgram(s) Capsule 1 Capsule(s) Inhalation daily  trimethoprim  160 mG/sulfamethoxazole 800 mG 1 Tablet(s) Oral daily    MEDICATIONS  (PRN):  ALBUTerol    90 MICROgram(s) HFA Inhaler 1 Puff(s) Inhalation two times a day PRN Bronchospasm  sodium chloride 0.65% Nasal 1 Spray(s) Both Nostrils three times a day PRN Nasal Congestion  sodium chloride 0.65% Nasal 1 Spray(s) Both Nostrils daily PRN Nasal Congestion      Vital Signs Last 24 Hrs  T(C): 36.5 (07 Jun 2018 04:20), Max: 36.5 (07 Jun 2018 04:20)  T(F): 97.7 (07 Jun 2018 04:20), Max: 97.7 (07 Jun 2018 04:20)  HR: 83 (07 Jun 2018 04:20) (69 - 83)  BP: 108/67 (07 Jun 2018 04:20) (88/45 - 127/72)  BP(mean): --  RR: 16 (07 Jun 2018 04:20) (16 - 18)  SpO2: 98% (07 Jun 2018 04:20) (93% - 98%)  nc/at  s1s2  cta  soft, nt, nd no guarding or rebound  no c/c/e    CBC Full  -  ( 06 Jun 2018 20:11 )  WBC Count : 5.5 K/uL  Hemoglobin : 8.8 g/dL  Hematocrit : 25.9 %  Platelet Count - Automated : 199 K/uL  Mean Cell Volume : 90.6 fl  Mean Cell Hemoglobin : 30.8 pg  Mean Cell Hemoglobin Concentration : 34.0 gm/dL  Auto Neutrophil # : x  Auto Lymphocyte # : x  Auto Monocyte # : x  Auto Eosinophil # : x  Auto Basophil # : x  Auto Neutrophil % : x  Auto Lymphocyte % : x  Auto Monocyte % : x  Auto Eosinophil % : x  Auto Basophil % : x    06-07    139  |  105  |  27<H>  ----------------------------<  80  4.2   |  27  |  0.62    Ca    8.3<L>      07 Jun 2018 06:16  Phos  2.1     06-07  Mg     1.9     06-07    TPro  6.6  /  Alb  2.9<L>  /  TBili  0.2  /  DBili  x   /  AST  44<H>  /  ALT  35  /  AlkPhos  184<H>  06-05    PT/INR - ( 05 Jun 2018 13:11 )   PT: 11.6 sec;   INR: 1.07 ratio         PTT - ( 05 Jun 2018 13:11 )  PTT:32.0 sec

## 2018-06-07 NOTE — DISCHARGE NOTE ADULT - PATIENT PORTAL LINK FT
You can access the [facility] Patient Portal, offered by [facility], by registering with the following website: [email or web address]

## 2018-06-07 NOTE — DISCHARGE NOTE ADULT - PLAN OF CARE
resolved s/p nasal packing, removed , cont nasal spray as advised s/p nasal packing, removed , cont nasal spray as advised, Strict blood pressure control.  - Nasal saline, 2 sprays to both nares 4 times a day  - Avoid nasal trauma; no nose rubbing, blowing or manipulating nasal packing.  Sneeze with mouth open and pinching nares.  - Avoid bending with head blow the waist.    -No heavy lifting. no bleeding s/p blood transfusion, h/h still low but stable, no more nasal-pharyngeal bleeding stable cont ASA and plavix per cardiologist cont midodrine cont prednisone cont proair, spiriva, singulair cont current home meds s/p nasal packing, removed , cont nasal spray as advised, Strict blood pressure control.  - Nasal saline, 2 sprays to both nares 4 times a day  - Avoid nasal trauma; no nose rubbing, blowing or manipulating nasal packing.  Sneeze with mouth open and pinching nares.  - Avoid bending with head blow the waist.    -No heavy lifting.  Follow up with Dr Montgomery in 3 days or as advised by ENT s/p nasal packing, not removed , cont nasal spray as advised, Strict blood pressure control.  - Nasal saline, 2 sprays to both nares 4 times a day  - Avoid nasal trauma; no nose rubbing, blowing or manipulating nasal packing.  Sneeze with mouth open and pinching nares.  - Avoid bending with head blow the waist.    -No heavy lifting.  Follow up with Andrew Brito ([phone]), cont Bactrim until pack removed s/p blood transfusion--2 units total, h/h is stable, no more nasal-pharyngeal bleeding, follow up with op hm/onc (Dr Trevino, [place]), s/p blood transfusion--2 units total, h/h is stable, no more nasal-pharyngeal bleeding, follow up with op hm/onc (Dr Trevino, [place]), needs to follow with wound care as out patient. Cont current treatment for wound. s/p blood transfusion--2 units total, h/h is stable, no more nasal-pharyngeal bleeding, follow up with op hm/onc (Dr Trevino, \[facility] [place] [facility]\""), needs to follow with wound care as out patient. Cont current treatment for wound.RUE wound- Medihoney & ALlevyn  Buttock abrasion - CAViLON  BLE elevation  Abx per Medicine/ ID  Moisturize intact skin w/ SWEEN cream BID  con't Nutrition (as tolerated), Nutrition Consult  con't Offloading   con't Pericare  Care as per medicine will follow w/ you  Upon discharge f/u as outpatient at [facility] [address] [facility] [phone]

## 2018-06-07 NOTE — DISCHARGE NOTE ADULT - CARE PLAN
Principal Discharge DX:	Nasopharyngeal bleed  Goal:	resolved  Assessment and plan of treatment:	s/p nasal packing, removed , cont nasal spray as advised  Secondary Diagnosis:	Anemia  Secondary Diagnosis:	H/O heart artery stent  Secondary Diagnosis:	Orthostatic hypotension  Secondary Diagnosis:	Rheumatoid arthritis  Secondary Diagnosis:	Asthma  Secondary Diagnosis:	BPH (benign prostatic hyperplasia) Principal Discharge DX:	Nasopharyngeal bleed  Goal:	resolved  Assessment and plan of treatment:	s/p nasal packing, removed , cont nasal spray as advised, Strict blood pressure control.  - Nasal saline, 2 sprays to both nares 4 times a day  - Avoid nasal trauma; no nose rubbing, blowing or manipulating nasal packing.  Sneeze with mouth open and pinching nares.  - Avoid bending with head blow the waist.    -No heavy lifting.  Secondary Diagnosis:	Anemia  Goal:	no bleeding  Assessment and plan of treatment:	s/p blood transfusion, h/h still low but stable, no more nasal-pharyngeal bleeding  Secondary Diagnosis:	H/O heart artery stent  Goal:	stable  Assessment and plan of treatment:	cont ASA and plavix per cardiologist  Secondary Diagnosis:	Orthostatic hypotension  Goal:	stable  Assessment and plan of treatment:	cont midodrine  Secondary Diagnosis:	Rheumatoid arthritis  Goal:	stable  Assessment and plan of treatment:	cont prednisone  Secondary Diagnosis:	Asthma  Goal:	stable  Assessment and plan of treatment:	cont proair, spiriva, singulair  Secondary Diagnosis:	BPH (benign prostatic hyperplasia)  Goal:	stable  Assessment and plan of treatment:	cont current home meds Principal Discharge DX:	Nasopharyngeal bleed  Goal:	resolved  Assessment and plan of treatment:	s/p nasal packing, removed , cont nasal spray as advised, Strict blood pressure control.  - Nasal saline, 2 sprays to both nares 4 times a day  - Avoid nasal trauma; no nose rubbing, blowing or manipulating nasal packing.  Sneeze with mouth open and pinching nares.  - Avoid bending with head blow the waist.    -No heavy lifting.  Follow up with Dr Montgomery in 3 days or as advised by ENT  Secondary Diagnosis:	Anemia  Goal:	no bleeding  Assessment and plan of treatment:	s/p blood transfusion, h/h still low but stable, no more nasal-pharyngeal bleeding  Secondary Diagnosis:	H/O heart artery stent  Goal:	stable  Assessment and plan of treatment:	cont ASA and plavix per cardiologist  Secondary Diagnosis:	Orthostatic hypotension  Goal:	stable  Assessment and plan of treatment:	cont midodrine  Secondary Diagnosis:	Rheumatoid arthritis  Goal:	stable  Assessment and plan of treatment:	cont prednisone  Secondary Diagnosis:	Asthma  Goal:	stable  Assessment and plan of treatment:	cont proair, spiriva, singulair  Secondary Diagnosis:	BPH (benign prostatic hyperplasia)  Goal:	stable  Assessment and plan of treatment:	cont current home meds Principal Discharge DX:	Nasopharyngeal bleed  Goal:	resolved  Assessment and plan of treatment:	s/p nasal packing, not removed , cont nasal spray as advised, Strict blood pressure control.  - Nasal saline, 2 sprays to both nares 4 times a day  - Avoid nasal trauma; no nose rubbing, blowing or manipulating nasal packing.  Sneeze with mouth open and pinching nares.  - Avoid bending with head blow the waist.    -No heavy lifting.  Follow up with Dr Montgomery [facility] ([phone]), cont Bactrim until pack removed  Secondary Diagnosis:	Anemia  Goal:	no bleeding  Assessment and plan of treatment:	s/p blood transfusion, h/h still low but stable, no more nasal-pharyngeal bleeding  Secondary Diagnosis:	H/O heart artery stent  Goal:	stable  Assessment and plan of treatment:	cont ASA and plavix per cardiologist  Secondary Diagnosis:	Orthostatic hypotension  Goal:	stable  Assessment and plan of treatment:	cont midodrine  Secondary Diagnosis:	Rheumatoid arthritis  Goal:	stable  Assessment and plan of treatment:	cont prednisone  Secondary Diagnosis:	Asthma  Goal:	stable  Assessment and plan of treatment:	cont proair, spiriva, singulair  Secondary Diagnosis:	BPH (benign prostatic hyperplasia)  Goal:	stable  Assessment and plan of treatment:	cont current home meds Principal Discharge DX:	Nasopharyngeal bleed  Goal:	resolved  Assessment and plan of treatment:	s/p nasal packing, not removed , cont nasal spray as advised, Strict blood pressure control.  - Nasal saline, 2 sprays to both nares 4 times a day  - Avoid nasal trauma; no nose rubbing, blowing or manipulating nasal packing.  Sneeze with mouth open and pinching nares.  - Avoid bending with head blow the waist.    -No heavy lifting.  Follow up with Dr Montgomery, Andrew  week ([phone]), cont Bactrim until pack removed  Secondary Diagnosis:	Anemia  Goal:	no bleeding  Assessment and plan of treatment:	s/p blood transfusion--2 units total, h/h is stable, no more nasal-pharyngeal bleeding, follow up with op hm/onc (Keo Campos),  Secondary Diagnosis:	H/O heart artery stent  Goal:	stable  Assessment and plan of treatment:	cont ASA and plavix per cardiologist  Secondary Diagnosis:	Orthostatic hypotension  Goal:	stable  Assessment and plan of treatment:	cont midodrine  Secondary Diagnosis:	Rheumatoid arthritis  Goal:	stable  Assessment and plan of treatment:	cont prednisone  Secondary Diagnosis:	Asthma  Goal:	stable  Assessment and plan of treatment:	cont proair, spiriva, singulair  Secondary Diagnosis:	BPH (benign prostatic hyperplasia)  Goal:	stable  Assessment and plan of treatment:	cont current home meds Principal Discharge DX:	Nasopharyngeal bleed  Goal:	resolved  Assessment and plan of treatment:	s/p nasal packing, not removed , cont nasal spray as advised, Strict blood pressure control.  - Nasal saline, 2 sprays to both nares 4 times a day  - Avoid nasal trauma; no nose rubbing, blowing or manipulating nasal packing.  Sneeze with mouth open and pinching nares.  - Avoid bending with head blow the waist.    -No heavy lifting.  Follow up with Dr Montgomery, Andrew  week ([phone]), cont Bactrim until pack removed  Secondary Diagnosis:	Anemia  Goal:	no bleeding  Assessment and plan of treatment:	s/p blood transfusion--2 units total, h/h is stable, no more nasal-pharyngeal bleeding, follow up with op hm/onc (Keo Campos), needs to follow with wound care as out patient. Cont current treatment for wound.  Secondary Diagnosis:	H/O heart artery stent  Goal:	stable  Assessment and plan of treatment:	cont ASA and plavix per cardiologist  Secondary Diagnosis:	Orthostatic hypotension  Goal:	stable  Assessment and plan of treatment:	cont midodrine  Secondary Diagnosis:	Rheumatoid arthritis  Goal:	stable  Assessment and plan of treatment:	cont prednisone  Secondary Diagnosis:	Asthma  Goal:	stable  Assessment and plan of treatment:	cont proair, spiriva, singulair  Secondary Diagnosis:	BPH (benign prostatic hyperplasia)  Goal:	stable  Assessment and plan of treatment:	cont current home meds Principal Discharge DX:	Nasopharyngeal bleed  Goal:	resolved  Assessment and plan of treatment:	s/p nasal packing, not removed , cont nasal spray as advised, Strict blood pressure control.  - Nasal saline, 2 sprays to both nares 4 times a day  - Avoid nasal trauma; no nose rubbing, blowing or manipulating nasal packing.  Sneeze with mouth open and pinching nares.  - Avoid bending with head blow the waist.    -No heavy lifting.  Follow up with Andrew Brito  week ([phone]), cont Bactrim until pack removed  Secondary Diagnosis:	Anemia  Goal:	no bleeding  Assessment and plan of treatment:	s/p blood transfusion--2 units total, h/h is stable, no more nasal-pharyngeal bleeding, follow up with op hm/onc (Keo Campos), needs to follow with wound care as out patient. Cont current treatment for wound.RUE wound- Medihoney & ALlevyn  Buttock abrasion - CAViLON  BLE elevation  Abx per Medicine/ ID  Moisturize intact skin w/ SWEEN cream BID  con't Nutrition (as tolerated), Nutrition Consult  con't Offloading   con't Pericare  Care as per medicine will follow w/ you  Upon discharge f/u as outpatient at [facility] [address] [place] [address] [phone]  Secondary Diagnosis:	H/O heart artery stent  Goal:	stable  Assessment and plan of treatment:	cont ASA and plavix per cardiologist  Secondary Diagnosis:	Orthostatic hypotension  Goal:	stable  Assessment and plan of treatment:	cont midodrine  Secondary Diagnosis:	Rheumatoid arthritis  Goal:	stable  Assessment and plan of treatment:	cont prednisone  Secondary Diagnosis:	Asthma  Goal:	stable  Assessment and plan of treatment:	cont proair, spiriva, singulair  Secondary Diagnosis:	BPH (benign prostatic hyperplasia)  Goal:	stable  Assessment and plan of treatment:	cont current home meds

## 2018-06-07 NOTE — DISCHARGE NOTE ADULT - MEDICATION SUMMARY - MEDICATIONS TO CHANGE
I will SWITCH the dose or number of times a day I take the medications listed below when I get home from the hospital:    Saline Nasal Mist 0.65% nasal solution  -- 2 spray(s) into nose every 6 hours, As Needed

## 2018-06-08 VITALS
TEMPERATURE: 97 F | DIASTOLIC BLOOD PRESSURE: 60 MMHG | SYSTOLIC BLOOD PRESSURE: 100 MMHG | RESPIRATION RATE: 16 BRPM | HEART RATE: 68 BPM | OXYGEN SATURATION: 97 %

## 2018-06-08 LAB
HCT VFR BLD CALC: 25 % — LOW (ref 39–50)
HGB BLD-MCNC: 8.2 G/DL — LOW (ref 13–17)
MCHC RBC-ENTMCNC: 29.3 PG — SIGNIFICANT CHANGE UP (ref 27–34)
MCHC RBC-ENTMCNC: 32.8 GM/DL — SIGNIFICANT CHANGE UP (ref 32–36)
MCV RBC AUTO: 89.3 FL — SIGNIFICANT CHANGE UP (ref 80–100)
PLATELET # BLD AUTO: 181 K/UL — SIGNIFICANT CHANGE UP (ref 150–400)
RBC # BLD: 2.8 M/UL — LOW (ref 4.2–5.8)
RBC # FLD: 18.9 % — HIGH (ref 10.3–14.5)
WBC # BLD: 4.06 K/UL — SIGNIFICANT CHANGE UP (ref 3.8–10.5)
WBC # FLD AUTO: 4.06 K/UL — SIGNIFICANT CHANGE UP (ref 3.8–10.5)

## 2018-06-08 PROCEDURE — 85014 HEMATOCRIT: CPT

## 2018-06-08 PROCEDURE — 84484 ASSAY OF TROPONIN QUANT: CPT

## 2018-06-08 PROCEDURE — 82803 BLOOD GASES ANY COMBINATION: CPT

## 2018-06-08 PROCEDURE — 99285 EMERGENCY DEPT VISIT HI MDM: CPT | Mod: 25

## 2018-06-08 PROCEDURE — 82435 ASSAY OF BLOOD CHLORIDE: CPT

## 2018-06-08 PROCEDURE — 93005 ELECTROCARDIOGRAM TRACING: CPT

## 2018-06-08 PROCEDURE — 82553 CREATINE MB FRACTION: CPT

## 2018-06-08 PROCEDURE — 84443 ASSAY THYROID STIM HORMONE: CPT

## 2018-06-08 PROCEDURE — 82947 ASSAY GLUCOSE BLOOD QUANT: CPT

## 2018-06-08 PROCEDURE — 82550 ASSAY OF CK (CPK): CPT

## 2018-06-08 PROCEDURE — 82272 OCCULT BLD FECES 1-3 TESTS: CPT

## 2018-06-08 PROCEDURE — 82330 ASSAY OF CALCIUM: CPT

## 2018-06-08 PROCEDURE — 86900 BLOOD TYPING SEROLOGIC ABO: CPT

## 2018-06-08 PROCEDURE — 83605 ASSAY OF LACTIC ACID: CPT

## 2018-06-08 PROCEDURE — 84132 ASSAY OF SERUM POTASSIUM: CPT

## 2018-06-08 PROCEDURE — 86923 COMPATIBILITY TEST ELECTRIC: CPT

## 2018-06-08 PROCEDURE — 85027 COMPLETE CBC AUTOMATED: CPT

## 2018-06-08 PROCEDURE — 86901 BLOOD TYPING SEROLOGIC RH(D): CPT

## 2018-06-08 PROCEDURE — P9016: CPT

## 2018-06-08 PROCEDURE — 84100 ASSAY OF PHOSPHORUS: CPT

## 2018-06-08 PROCEDURE — 84295 ASSAY OF SERUM SODIUM: CPT

## 2018-06-08 PROCEDURE — 85730 THROMBOPLASTIN TIME PARTIAL: CPT

## 2018-06-08 PROCEDURE — 83735 ASSAY OF MAGNESIUM: CPT

## 2018-06-08 PROCEDURE — 86850 RBC ANTIBODY SCREEN: CPT

## 2018-06-08 PROCEDURE — 83690 ASSAY OF LIPASE: CPT

## 2018-06-08 PROCEDURE — 80048 BASIC METABOLIC PNL TOTAL CA: CPT

## 2018-06-08 PROCEDURE — 85610 PROTHROMBIN TIME: CPT

## 2018-06-08 PROCEDURE — 80185 ASSAY OF PHENYTOIN TOTAL: CPT

## 2018-06-08 PROCEDURE — 36430 TRANSFUSION BLD/BLD COMPNT: CPT

## 2018-06-08 PROCEDURE — 80053 COMPREHEN METABOLIC PANEL: CPT

## 2018-06-08 PROCEDURE — 94640 AIRWAY INHALATION TREATMENT: CPT

## 2018-06-08 RX ORDER — SUCRALFATE 1 G
1 TABLET ORAL
Qty: 120 | Refills: 0 | OUTPATIENT
Start: 2018-06-08 | End: 2018-07-07

## 2018-06-08 RX ORDER — BENZOCAINE AND MENTHOL 5; 1 G/100ML; G/100ML
1 LIQUID ORAL ONCE
Qty: 0 | Refills: 0 | Status: COMPLETED | OUTPATIENT
Start: 2018-06-08 | End: 2018-06-08

## 2018-06-08 RX ADMIN — Medication 10 MILLIGRAM(S): at 05:22

## 2018-06-08 RX ADMIN — Medication 100 MILLIGRAM(S): at 11:35

## 2018-06-08 RX ADMIN — Medication 5 MILLIGRAM(S): at 05:22

## 2018-06-08 RX ADMIN — Medication 1 GRAM(S): at 05:22

## 2018-06-08 RX ADMIN — BENZOCAINE AND MENTHOL 1 LOZENGE: 5; 1 LIQUID ORAL at 00:27

## 2018-06-08 RX ADMIN — MONTELUKAST 10 MILLIGRAM(S): 4 TABLET, CHEWABLE ORAL at 11:35

## 2018-06-08 RX ADMIN — Medication 200 MILLIGRAM(S): at 05:21

## 2018-06-08 RX ADMIN — Medication 1 TABLET(S): at 11:36

## 2018-06-08 RX ADMIN — Medication 1 MILLIGRAM(S): at 11:35

## 2018-06-08 RX ADMIN — MIDODRINE HYDROCHLORIDE 10 MILLIGRAM(S): 2.5 TABLET ORAL at 05:21

## 2018-06-08 RX ADMIN — FINASTERIDE 5 MILLIGRAM(S): 5 TABLET, FILM COATED ORAL at 11:35

## 2018-06-08 RX ADMIN — PREGABALIN 500 MICROGRAM(S): 225 CAPSULE ORAL at 11:34

## 2018-06-08 RX ADMIN — Medication 0.5 MILLIGRAM(S): at 05:21

## 2018-06-08 RX ADMIN — Medication 1 GRAM(S): at 11:35

## 2018-06-08 RX ADMIN — Medication 25 MICROGRAM(S): at 05:21

## 2018-06-08 NOTE — PROGRESS NOTE ADULT - SUBJECTIVE AND OBJECTIVE BOX
INTERVAL HPI/OVERNIGHT EVENTS: pt states epistaxis resolved but that he is coughing up blood, stool remains dark, Hb increased after transfusion    MEDICATIONS  (STANDING):  ALPRAZolam 0.5 milliGRAM(s) Oral two times a day  amitriptyline 10 milliGRAM(s) Oral two times a day  aspirin enteric coated 81 milliGRAM(s) Oral daily  clopidogrel Tablet 75 milliGRAM(s) Oral daily  cyanocobalamin 500 MICROGram(s) Oral daily  finasteride 5 milliGRAM(s) Oral daily  fluticasone propionate 50 MICROgram(s)/spray Nasal Spray 1 Spray(s) Both Nostrils two times a day  folic acid 1 milliGRAM(s) Oral daily  levothyroxine 25 MICROGram(s) Oral daily  midodrine 10 milliGRAM(s) Oral every 8 hours  montelukast 10 milliGRAM(s) Oral daily  phenytoin   Capsule 200 milliGRAM(s) Oral two times a day  predniSONE   Tablet 5 milliGRAM(s) Oral daily  pyridoxine 100 milliGRAM(s) Oral daily  simvastatin 20 milliGRAM(s) Oral at bedtime  sucralfate suspension 1 Gram(s) Oral four times a day  tamsulosin 0.4 milliGRAM(s) Oral at bedtime  tiotropium 18 MICROgram(s) Capsule 1 Capsule(s) Inhalation daily  trimethoprim  160 mG/sulfamethoxazole 800 mG 1 Tablet(s) Oral daily    MEDICATIONS  (PRN):  ALBUTerol    90 MICROgram(s) HFA Inhaler 1 Puff(s) Inhalation two times a day PRN Bronchospasm  sodium chloride 0.65% Nasal 1 Spray(s) Both Nostrils three times a day PRN Nasal Congestion  sodium chloride 0.65% Nasal 1 Spray(s) Both Nostrils daily PRN Nasal Congestion      Allergies    penicillin (Rash)    Intolerances            PHYSICAL EXAM:   Vital Signs:  Vital Signs Last 24 Hrs  T(C): 36.3 (2018 04:13), Max: 36.6 (2018 13:36)  T(F): 97.4 (2018 04:13), Max: 97.8 (2018 13:36)  HR: 72 (2018 04:13) (70 - 74)  BP: 105/55 (2018 04:13) (90/49 - 113/62)  BP(mean): --  RR: 16 (2018 04:13) (16 - 16)  SpO2: 96% (2018 04:13) (92% - 98%)  Daily     Daily Weight in k.4 (2018 07:03)    GENERAL:  no distress  HEENT:  NC/AT,  anicteric  CHEST:   no increased effort, breath sounds clear  HEART:  Regular rhythm  ABDOMEN:  Soft, non-tender, non-distended, normoactive bowel sounds,  no masses   rectal-soft formed dark stool but not melena  EXTEREMITIES:  no cyanosis      LABS:                        8.2    4.06  )-----------( 181      ( 2018 07:54 )             25.0     06-07    139  |  105  |  27<H>  ----------------------------<  80  4.2   |  27  |  0.62    Ca    8.3<L>      2018 06:16  Phos  2.1     06-07  Mg     1.9     06-07            RADIOLOGY & ADDITIONAL TESTS:

## 2018-06-08 NOTE — PROGRESS NOTE ADULT - ASSESSMENT
Bleeding source unclear, on anticoagulation, deemed too high risk for endoscopic evaluation  supportive care  PPI therapy  discussed with family

## 2018-06-08 NOTE — PROGRESS NOTE ADULT - SUBJECTIVE AND OBJECTIVE BOX
- Patient seen and examined.  - In summary, patient is a 82 year old man who presented with hemoptysis (31 May 2018 14:01)  - Today, patient is without complaints.         *****MEDICATIONS:    MEDICATIONS  (STANDING):  ALPRAZolam 0.5 milliGRAM(s) Oral two times a day  amitriptyline 10 milliGRAM(s) Oral two times a day  aspirin enteric coated 81 milliGRAM(s) Oral daily  clopidogrel Tablet 75 milliGRAM(s) Oral daily  cyanocobalamin 500 MICROGram(s) Oral daily  finasteride 5 milliGRAM(s) Oral daily  fluticasone propionate 50 MICROgram(s)/spray Nasal Spray 1 Spray(s) Both Nostrils two times a day  folic acid 1 milliGRAM(s) Oral daily  levothyroxine 25 MICROGram(s) Oral daily  midodrine 10 milliGRAM(s) Oral every 8 hours  montelukast 10 milliGRAM(s) Oral daily  phenytoin   Capsule 200 milliGRAM(s) Oral two times a day  predniSONE   Tablet 5 milliGRAM(s) Oral daily  pyridoxine 100 milliGRAM(s) Oral daily  simvastatin 20 milliGRAM(s) Oral at bedtime  sucralfate suspension 1 Gram(s) Oral four times a day  tamsulosin 0.4 milliGRAM(s) Oral at bedtime  tiotropium 18 MICROgram(s) Capsule 1 Capsule(s) Inhalation daily  trimethoprim  160 mG/sulfamethoxazole 800 mG 1 Tablet(s) Oral daily    MEDICATIONS  (PRN):  ALBUTerol    90 MICROgram(s) HFA Inhaler 1 Puff(s) Inhalation two times a day PRN Bronchospasm  sodium chloride 0.65% Nasal 1 Spray(s) Both Nostrils three times a day PRN Nasal Congestion  sodium chloride 0.65% Nasal 1 Spray(s) Both Nostrils daily PRN Nasal Congestion                 ***** REVIEW OF SYSTEM:  GEN: no fever, no chills, no pain  RESP: no SOB, no cough, no sputum  CVS: no chest pain, no palpitations, no edema  GI: no abdominal pain, no nausea, no vomiting, no constipation, no diarrhea  : no dysuria, no frequency  NEURO: no headache, no dizziness  PSYCH: no depression, not anxious  Derm : no itching, no rash         ***** VITAL SIGNS:    T(F): 97.4 (18 @ 04:13), Max: 97.8 (18 @ 13:36)  HR: 72 (18 @ 04:13) (70 - 74)  BP: 105/55 (18 @ 04:13) (90/49 - 113/62)  RR: 16 (18 @ 04:13) (16 - 16)  SpO2: 96% (18 @ 04:13) (92% - 98%)  Wt(kg): --  ,   I&O's Summary    2018 07:01  -  2018 07:00  --------------------------------------------------------  IN: 600 mL / OUT: 650 mL / NET: -50 mL                   *****PHYSICAL EXAM:  GEN: A&O X 3 , NAD , comfortable  HEENT: NCAT, EOMI, MMM, no icterus  NECK: Supple, No JVD  CVS: S1S2 , regular , No M/R/G appreciated  PULM: CTA B/L,  no W/R/R appreciated  ABD.: soft. non tender, non distended,  bowel sounds present  Extrem: intact pulses , no edema noted  Derm: No rash or ecchymosis noted  PSYCH: normal mood, no depression, not anxious         *****LAB AND IMAGIN.2    4.06  )-----------( 181      ( 2018 07:54 )             25.0               06-07    139  |  105  |  27<H>  ----------------------------<  80  4.2   |  27  |  0.62    Ca    8.3<L>      2018 06:16  Phos  2.1     06-07  Mg     1.9     06-07              [All pertinent recent Imaging/Reports reviewed]         *****A S S E S S M E N T   A N D   P L A N :  82M  with CAD s/p recent stents orthostatic hypotension who presented with recurrent hemoptyses  f/u ENT attending  prior ROJAS  continue asa and plavix  continue midodrine  H/H incr s/p prbc  DCP if heme clears    __________________________  A. TRE Brady.

## 2018-06-08 NOTE — CHART NOTE - NSCHARTNOTEFT_GEN_A_CORE
82 year old male pt with PMH h/o CAD s/p 6 stents (most recent 4/2018) on ASA and plavix for stents, Esophageal CA s/p chemo/RT and esophagogastrectomy (8/14/2017). Presented to ED with c/o bleeding from nasal passage and spitting up blood, (+) dark stool x 1 day. S/p ENT 6/4/18 for removal of a pyogenic granuloma. Stage 1 sacral pressure ulcer noted; R forearm wound noted pending wound specialist consult. ENT and GI following.    Pt seen for malnutrition follow up.     Source: Patient [x]    Family [ ]     other [x] electronic medical records    Diet : Regular, Ensure Enlive x 2 (700 kcal, 40 grams protein)    Patient reports [ ] nausea  [ ] vomiting [ ] diarrhea [ ] constipation  [ ]chewing problems [ ] swallowing issues  [x] other:     PO intake:  < 50% [ ] 50-75% [ ]   % [ ]  other :    Source for PO intake [x] Patient [ ] family [x] chart [ ] staff [ ] other    Enteral /Parenteral Nutrition: [x] n/a    Current Weight: 119.9 pounds (current, bedscale, ). 116.6 pounds     Pertinent Medications: MEDICATIONS  (STANDING):  ALPRAZolam 0.5 milliGRAM(s) Oral two times a day  amitriptyline 10 milliGRAM(s) Oral two times a day  aspirin enteric coated 81 milliGRAM(s) Oral daily  clopidogrel Tablet 75 milliGRAM(s) Oral daily  cyanocobalamin 500 MICROGram(s) Oral daily  finasteride 5 milliGRAM(s) Oral daily  fluticasone propionate 50 MICROgram(s)/spray Nasal Spray 1 Spray(s) Both Nostrils two times a day  folic acid 1 milliGRAM(s) Oral daily  levothyroxine 25 MICROGram(s) Oral daily  midodrine 10 milliGRAM(s) Oral every 8 hours  montelukast 10 milliGRAM(s) Oral daily  phenytoin   Capsule 200 milliGRAM(s) Oral two times a day  predniSONE   Tablet 5 milliGRAM(s) Oral daily  pyridoxine 100 milliGRAM(s) Oral daily  simvastatin 20 milliGRAM(s) Oral at bedtime  sucralfate suspension 1 Gram(s) Oral four times a day  tamsulosin 0.4 milliGRAM(s) Oral at bedtime  tiotropium 18 MICROgram(s) Capsule 1 Capsule(s) Inhalation daily  trimethoprim  160 mG/sulfamethoxazole 800 mG 1 Tablet(s) Oral daily    MEDICATIONS  (PRN):  ALBUTerol    90 MICROgram(s) HFA Inhaler 1 Puff(s) Inhalation two times a day PRN Bronchospasm  sodium chloride 0.65% Nasal 1 Spray(s) Both Nostrils three times a day PRN Nasal Congestion  sodium chloride 0.65% Nasal 1 Spray(s) Both Nostrils daily PRN Nasal Congestion    Pertinent Labs:  06-07 Na139 mmol/L Glu 80 mg/dL K+ 4.2 mmol/L Cr  0.62 mg/dL BUN 27 mg/dL<H> 06-07 Phos 2.1 mg/dL<L> 06-05 Alb 2.9 g/dL<L> 05-27 [id number] 4.7 % 05-28 Chol 174 mg/dL LDL 75 mg/dL HDL 75 mg/dL Trig 120 mg/dL      Skin: Sacrum Stage I      Estimated Needs:   [x] no change since previous assessment  [ ] recalculated:       Previous Nutrition Diagnosis: Severe Malnutrition       Nutrition Diagnosis is [x] ongoing  [ ] resolved [ ] not applicable        New Nutrition Diagnosis: [x] not applicable      Interventions:     1)       Monitoring and Evaluation:     [x] PO intake [x] Tolerance to diet prescription [x] weights [x] follow up per protocol    [x] other: RD remains available: Darlene Quintana MS, RDN, CDN, CDE. #[phone] 82 year old male pt with PMH h/o CAD s/p 6 stents (most recent 4/2018) on ASA and plavix for stents, Esophageal CA s/p chemo/RT and esophagogastrectomy (8/14/2017). Presented to ED with c/o bleeding from nasal passage and spitting up blood, (+) dark stool x 1 day. S/p ENT 6/4/18 for removal of a pyogenic granuloma. ENT and GI following.    Pt seen for malnutrition follow up. Pt eating fairly well at breakfast this morning, receiving assistance from daughter at bedside. Pt enjoys Ensure Enlive Strawberry flavor, able to drink 1-2 containers daily to supplement intake. Pt denies new diet preferences, states he enjoyed the short rib entree last night for dinner.    Source: Patient [x]    Family [ ]     other [x] electronic medical records    Diet : Regular, Supplement: Ensure Enlive x 2 (700 kcal, 40 grams protein)    Patient reports [ ] nausea  [ ] vomiting [ ] diarrhea [ ] constipation  [ ]chewing problems [ ] swallowing issues  [x] other: c/o intermittent dysphagia, chronic, but declines downgrade of diet, per pt able to manage well with regular diet    PO intake (meals):  < 50% [x] 50-75% [x]   % [ ]  other :  PO intake (supplements): 1-2 containers Ensure Enlive daily    Source for PO intake [x] Patient [ ] family [x] chart [ ] staff [ ] other    Enteral /Parenteral Nutrition: [x] n/a    Current Weight: 119.9 pounds (current, bedscale, +1 B/L leg edema). No significant change from 116.6 pounds admit wt.    Pertinent Medications: MEDICATIONS  (STANDING):  ALPRAZolam 0.5 milliGRAM(s) Oral two times a day  amitriptyline 10 milliGRAM(s) Oral two times a day  aspirin enteric coated 81 milliGRAM(s) Oral daily  clopidogrel Tablet 75 milliGRAM(s) Oral daily  cyanocobalamin 500 MICROGram(s) Oral daily  finasteride 5 milliGRAM(s) Oral daily  fluticasone propionate 50 MICROgram(s)/spray Nasal Spray 1 Spray(s) Both Nostrils two times a day  folic acid 1 milliGRAM(s) Oral daily  levothyroxine 25 MICROGram(s) Oral daily  midodrine 10 milliGRAM(s) Oral every 8 hours  montelukast 10 milliGRAM(s) Oral daily  phenytoin   Capsule 200 milliGRAM(s) Oral two times a day  predniSONE   Tablet 5 milliGRAM(s) Oral daily  pyridoxine 100 milliGRAM(s) Oral daily  simvastatin 20 milliGRAM(s) Oral at bedtime  sucralfate suspension 1 Gram(s) Oral four times a day  tamsulosin 0.4 milliGRAM(s) Oral at bedtime  tiotropium 18 MICROgram(s) Capsule 1 Capsule(s) Inhalation daily  trimethoprim  160 mG/sulfamethoxazole 800 mG 1 Tablet(s) Oral daily    MEDICATIONS  (PRN):  ALBUTerol    90 MICROgram(s) HFA Inhaler 1 Puff(s) Inhalation two times a day PRN Bronchospasm  sodium chloride 0.65% Nasal 1 Spray(s) Both Nostrils three times a day PRN Nasal Congestion  sodium chloride 0.65% Nasal 1 Spray(s) Both Nostrils daily PRN Nasal Congestion    Pertinent Labs:  06-07 Na139 mmol/L Glu 80 mg/dL K+ 4.2 mmol/L Cr  0.62 mg/dL BUN 27 mg/dL<H> 06-07 Phos 2.1 mg/dL<L> 06-05 Alb 2.9 g/dL<L> 05-27 [id number] 4.7 % 05-28 Chol 174 mg/dL LDL 75 mg/dL HDL 75 mg/dL Trig 120 mg/dL      Skin: Sacrum Stage I      Estimated Needs:   [x] no change since previous assessment  [ ] recalculated:       Previous Nutrition Diagnosis: Severe Malnutrition       Nutrition Diagnosis is [x] ongoing. addressed with po diet + oral supplement  [ ] resolved [ ] not applicable        New Nutrition Diagnosis: [x] not applicable      Interventions:     1) Continue regular liberalized po diet in setting of malnutrition. Encouraged adequate intake of protein-rich food items and supplements for additional nutrients to aide in preserving skin integrity. Assist with meal set-up as needed.  2) Continue Ensure Enlive x 2 strawberry flavor       Monitoring and Evaluation:     [x] PO intake [x] Tolerance to diet prescription [x] weights [x] follow up per protocol    [x] other: RD remains available: Darlene Quintana MS, RDN, CDN, CDE. #[phone] 82 year old male pt with PMH h/o CAD s/p 6 stents (most recent 4/2018) on ASA and plavix for stents, Esophageal CA s/p chemo/RT and esophagogastrectomy (8/14/2017). Presented to ED with c/o bleeding from nasal passage and spitting up blood, (+) dark stool x 1 day. S/p ENT 6/4/18 for removal of a pyogenic granuloma. ENT and GI following.    Pt seen for malnutrition follow up. Pt eating fairly well at breakfast this morning, receiving assistance from daughter at bedside. Pt enjoys Ensure Enlive Strawberry flavor, able to drink 1-2 containers daily to supplement intake. Pt denies new diet preferences, states he enjoyed the short rib entree last night for dinner.    Source: Patient [x]    Family [ ]     other [x] electronic medical records    Diet : Regular, Supplement: Ensure Enlive x 2 (700 kcal, 40 grams protein)    Patient reports [ ] nausea  [ ] vomiting [ ] diarrhea [ ] constipation  [ ]chewing problems [ ] swallowing issues  [x] other: c/o intermittent dysphagia, chronic, but declines downgrade of diet, per pt able to manage well with regular diet    PO intake (meals):  < 50% [x] 50-75% [x]   % [ ]  other :  PO intake (supplements): 1-2 containers Ensure Enlive daily    Source for PO intake [x] Patient [ ] family [x] chart [ ] staff [ ] other    Enteral /Parenteral Nutrition: [x] n/a    Current Weight: 119.9 pounds (current, bedscale, +1 B/L leg edema). No significant change from 116.6 pounds admit wt.    Pertinent Medications: MEDICATIONS  (STANDING):  ALPRAZolam 0.5 milliGRAM(s) Oral two times a day  amitriptyline 10 milliGRAM(s) Oral two times a day  aspirin enteric coated 81 milliGRAM(s) Oral daily  clopidogrel Tablet 75 milliGRAM(s) Oral daily  cyanocobalamin 500 MICROGram(s) Oral daily  finasteride 5 milliGRAM(s) Oral daily  fluticasone propionate 50 MICROgram(s)/spray Nasal Spray 1 Spray(s) Both Nostrils two times a day  folic acid 1 milliGRAM(s) Oral daily  levothyroxine 25 MICROGram(s) Oral daily  midodrine 10 milliGRAM(s) Oral every 8 hours  montelukast 10 milliGRAM(s) Oral daily  phenytoin   Capsule 200 milliGRAM(s) Oral two times a day  predniSONE   Tablet 5 milliGRAM(s) Oral daily  pyridoxine 100 milliGRAM(s) Oral daily  simvastatin 20 milliGRAM(s) Oral at bedtime  sucralfate suspension 1 Gram(s) Oral four times a day  tamsulosin 0.4 milliGRAM(s) Oral at bedtime  tiotropium 18 MICROgram(s) Capsule 1 Capsule(s) Inhalation daily  trimethoprim  160 mG/sulfamethoxazole 800 mG 1 Tablet(s) Oral daily    MEDICATIONS  (PRN):  ALBUTerol    90 MICROgram(s) HFA Inhaler 1 Puff(s) Inhalation two times a day PRN Bronchospasm  sodium chloride 0.65% Nasal 1 Spray(s) Both Nostrils three times a day PRN Nasal Congestion  sodium chloride 0.65% Nasal 1 Spray(s) Both Nostrils daily PRN Nasal Congestion    Pertinent Labs:  06-07 Na139 mmol/L Glu 80 mg/dL K+ 4.2 mmol/L Cr  0.62 mg/dL BUN 27 mg/dL<H> 06-07 Phos 2.1 mg/dL<L> 06-05 Alb 2.9 g/dL<L> 05-27 [id number] 4.7 % 05-28 Chol 174 mg/dL LDL 75 mg/dL HDL 75 mg/dL Trig 120 mg/dL      Skin: Sacrum Stage I      Estimated Needs:   [x] no change since previous assessment  [ ] recalculated:       Previous Nutrition Diagnosis: Severe Malnutrition       Nutrition Diagnosis is [x] ongoing. addressed with po diet + oral supplement  [ ] resolved [ ] not applicable        New Nutrition Diagnosis: [x] not applicable      Interventions:     1) Continue regular liberalized po diet in setting of malnutrition. Pt does not want downgrade of diet consistency despite intermitted dysphagia which is chronic. Encouraged adequate intake of protein-rich food items and supplements for additional nutrients to aide in preserving skin integrity. Assist with meal set-up as needed.  2) Continue Ensure Enlive x 2 strawberry flavor       Monitoring and Evaluation:     [x] PO intake [x] Tolerance to diet prescription [x] weights [x] follow up per protocol    [x] other: RD remains available: Darlene Quintana MS, RDN, CDN, CDE. #[phone]

## 2018-06-12 ENCOUNTER — APPOINTMENT (OUTPATIENT)
Dept: OTOLARYNGOLOGY | Facility: CLINIC | Age: 83
End: 2018-06-12
Payer: MEDICARE

## 2018-06-12 VITALS
DIASTOLIC BLOOD PRESSURE: 49 MMHG | HEIGHT: 63 IN | HEART RATE: 61 BPM | BODY MASS INDEX: 22.5 KG/M2 | WEIGHT: 127 LBS | SYSTOLIC BLOOD PRESSURE: 87 MMHG

## 2018-06-12 DIAGNOSIS — R04.0 EPISTAXIS: ICD-10-CM

## 2018-06-12 DIAGNOSIS — J34.9 UNSPECIFIED DISORDER OF NOSE AND NASAL SINUSES: ICD-10-CM

## 2018-06-12 DIAGNOSIS — H90.3 SENSORINEURAL HEARING LOSS, BILATERAL: ICD-10-CM

## 2018-06-12 PROCEDURE — 31238 NSL/SINS NDSC SRG NSL HEMRRG: CPT | Mod: RT

## 2018-06-12 PROCEDURE — 99213 OFFICE O/P EST LOW 20 MIN: CPT | Mod: 25

## 2018-06-13 ENCOUNTER — OUTPATIENT (OUTPATIENT)
Dept: OUTPATIENT SERVICES | Facility: HOSPITAL | Age: 83
LOS: 1 days | End: 2018-06-13

## 2018-06-13 DIAGNOSIS — Z96.649 PRESENCE OF UNSPECIFIED ARTIFICIAL HIP JOINT: Chronic | ICD-10-CM

## 2018-06-13 DIAGNOSIS — M12.9 ARTHROPATHY, UNSPECIFIED: Chronic | ICD-10-CM

## 2018-06-13 DIAGNOSIS — Z98.890 OTHER SPECIFIED POSTPROCEDURAL STATES: Chronic | ICD-10-CM

## 2018-06-13 DIAGNOSIS — Z95.5 PRESENCE OF CORONARY ANGIOPLASTY IMPLANT AND GRAFT: Chronic | ICD-10-CM

## 2018-06-13 DIAGNOSIS — Z90.89 ACQUIRED ABSENCE OF OTHER ORGANS: Chronic | ICD-10-CM

## 2018-06-13 DIAGNOSIS — K91.89 OTHER POSTPROCEDURAL COMPLICATIONS AND DISORDERS OF DIGESTIVE SYSTEM: Chronic | ICD-10-CM

## 2018-06-13 DIAGNOSIS — D64.9 ANEMIA, UNSPECIFIED: ICD-10-CM

## 2018-06-14 ENCOUNTER — INPATIENT (INPATIENT)
Facility: HOSPITAL | Age: 83
LOS: 1 days | Discharge: HOME | DRG: 313 | End: 2018-06-16
Attending: INTERNAL MEDICINE | Admitting: INTERNAL MEDICINE
Payer: MEDICARE

## 2018-06-14 ENCOUNTER — OUTPATIENT (OUTPATIENT)
Dept: OUTPATIENT SERVICES | Facility: HOSPITAL | Age: 83
LOS: 1 days | Discharge: HOME | End: 2018-06-14

## 2018-06-14 DIAGNOSIS — Z95.5 PRESENCE OF CORONARY ANGIOPLASTY IMPLANT AND GRAFT: Chronic | ICD-10-CM

## 2018-06-14 DIAGNOSIS — M12.9 ARTHROPATHY, UNSPECIFIED: Chronic | ICD-10-CM

## 2018-06-14 DIAGNOSIS — K91.89 OTHER POSTPROCEDURAL COMPLICATIONS AND DISORDERS OF DIGESTIVE SYSTEM: Chronic | ICD-10-CM

## 2018-06-14 DIAGNOSIS — Z96.649 PRESENCE OF UNSPECIFIED ARTIFICIAL HIP JOINT: Chronic | ICD-10-CM

## 2018-06-14 DIAGNOSIS — Z98.890 OTHER SPECIFIED POSTPROCEDURAL STATES: Chronic | ICD-10-CM

## 2018-06-14 DIAGNOSIS — Z90.89 ACQUIRED ABSENCE OF OTHER ORGANS: Chronic | ICD-10-CM

## 2018-06-14 DIAGNOSIS — D69.6 THROMBOCYTOPENIA, UNSPECIFIED: ICD-10-CM

## 2018-06-14 DIAGNOSIS — R07.9 CHEST PAIN, UNSPECIFIED: ICD-10-CM

## 2018-06-14 PROCEDURE — 99223 1ST HOSP IP/OBS HIGH 75: CPT | Mod: AI

## 2018-06-14 PROCEDURE — 71045 X-RAY EXAM CHEST 1 VIEW: CPT | Mod: 26

## 2018-06-14 PROCEDURE — 93010 ELECTROCARDIOGRAM REPORT: CPT | Mod: 76

## 2018-06-14 PROCEDURE — 70450 CT HEAD/BRAIN W/O DYE: CPT | Mod: 26

## 2018-06-15 ENCOUNTER — APPOINTMENT (OUTPATIENT)
Age: 83
End: 2018-06-15

## 2018-06-15 PROCEDURE — 99222 1ST HOSP IP/OBS MODERATE 55: CPT

## 2018-06-15 PROCEDURE — 93306 TTE W/DOPPLER COMPLETE: CPT | Mod: 26

## 2018-06-15 PROCEDURE — 99223 1ST HOSP IP/OBS HIGH 75: CPT

## 2018-06-15 PROCEDURE — 99233 SBSQ HOSP IP/OBS HIGH 50: CPT

## 2018-06-16 ENCOUNTER — APPOINTMENT (OUTPATIENT)
Age: 83
End: 2018-06-16

## 2018-06-16 PROCEDURE — 84100 ASSAY OF PHOSPHORUS: CPT

## 2018-06-16 PROCEDURE — 85730 THROMBOPLASTIN TIME PARTIAL: CPT

## 2018-06-16 PROCEDURE — 99231 SBSQ HOSP IP/OBS SF/LOW 25: CPT

## 2018-06-16 PROCEDURE — 86923 COMPATIBILITY TEST ELECTRIC: CPT

## 2018-06-16 PROCEDURE — 80061 LIPID PANEL: CPT

## 2018-06-16 PROCEDURE — 80053 COMPREHEN METABOLIC PANEL: CPT

## 2018-06-16 PROCEDURE — 84443 ASSAY THYROID STIM HORMONE: CPT

## 2018-06-16 PROCEDURE — 99285 EMERGENCY DEPT VISIT HI MDM: CPT | Mod: 25

## 2018-06-16 PROCEDURE — 70450 CT HEAD/BRAIN W/O DYE: CPT

## 2018-06-16 PROCEDURE — P9016: CPT

## 2018-06-16 PROCEDURE — 80185 ASSAY OF PHENYTOIN TOTAL: CPT

## 2018-06-16 PROCEDURE — 83036 HEMOGLOBIN GLYCOSYLATED A1C: CPT

## 2018-06-16 PROCEDURE — 97161 PT EVAL LOW COMPLEX 20 MIN: CPT

## 2018-06-16 PROCEDURE — 83880 ASSAY OF NATRIURETIC PEPTIDE: CPT

## 2018-06-16 PROCEDURE — 85045 AUTOMATED RETICULOCYTE COUNT: CPT

## 2018-06-16 PROCEDURE — 82553 CREATINE MB FRACTION: CPT

## 2018-06-16 PROCEDURE — C8929: CPT

## 2018-06-16 PROCEDURE — 82550 ASSAY OF CK (CPK): CPT

## 2018-06-16 PROCEDURE — 80048 BASIC METABOLIC PNL TOTAL CA: CPT

## 2018-06-16 PROCEDURE — 82728 ASSAY OF FERRITIN: CPT

## 2018-06-16 PROCEDURE — 93005 ELECTROCARDIOGRAM TRACING: CPT

## 2018-06-16 PROCEDURE — 86850 RBC ANTIBODY SCREEN: CPT

## 2018-06-16 PROCEDURE — 81003 URINALYSIS AUTO W/O SCOPE: CPT

## 2018-06-16 PROCEDURE — 86900 BLOOD TYPING SEROLOGIC ABO: CPT

## 2018-06-16 PROCEDURE — 71045 X-RAY EXAM CHEST 1 VIEW: CPT

## 2018-06-16 PROCEDURE — 85610 PROTHROMBIN TIME: CPT

## 2018-06-16 PROCEDURE — 85027 COMPLETE CBC AUTOMATED: CPT

## 2018-06-16 PROCEDURE — 82962 GLUCOSE BLOOD TEST: CPT

## 2018-06-16 PROCEDURE — 99232 SBSQ HOSP IP/OBS MODERATE 35: CPT

## 2018-06-16 PROCEDURE — 82607 VITAMIN B-12: CPT

## 2018-06-16 PROCEDURE — 83605 ASSAY OF LACTIC ACID: CPT

## 2018-06-16 PROCEDURE — 87040 BLOOD CULTURE FOR BACTERIA: CPT

## 2018-06-16 PROCEDURE — 84484 ASSAY OF TROPONIN QUANT: CPT

## 2018-06-16 PROCEDURE — 99239 HOSP IP/OBS DSCHRG MGMT >30: CPT

## 2018-06-16 PROCEDURE — 85025 COMPLETE CBC W/AUTO DIFF WBC: CPT

## 2018-06-16 PROCEDURE — 83550 IRON BINDING TEST: CPT

## 2018-06-16 PROCEDURE — 36430 TRANSFUSION BLD/BLD COMPNT: CPT

## 2018-06-16 PROCEDURE — 83735 ASSAY OF MAGNESIUM: CPT

## 2018-06-16 PROCEDURE — 96361 HYDRATE IV INFUSION ADD-ON: CPT

## 2018-06-16 PROCEDURE — 96365 THER/PROPH/DIAG IV INF INIT: CPT

## 2018-06-16 RX ORDER — ALBUTEROL 90 UG/1
2 AEROSOL, METERED ORAL
Qty: 0 | Refills: 0 | COMMUNITY

## 2018-06-16 RX ORDER — TIOTROPIUM BROMIDE 18 UG/1
1 CAPSULE ORAL; RESPIRATORY (INHALATION)
Qty: 0 | Refills: 0 | COMMUNITY

## 2018-06-16 RX ORDER — SIMVASTATIN 20 MG/1
1 TABLET, FILM COATED ORAL
Qty: 0 | Refills: 0 | COMMUNITY

## 2018-06-16 RX ORDER — ALPRAZOLAM 0.25 MG
2 TABLET ORAL
Qty: 0 | Refills: 0 | COMMUNITY

## 2018-06-16 RX ORDER — PYRIDOXINE HCL (VITAMIN B6) 100 MG
0 TABLET ORAL
Qty: 0 | Refills: 0 | COMMUNITY

## 2018-06-19 LAB
CULTURE RESULTS: SIGNIFICANT CHANGE UP
CULTURE RESULTS: SIGNIFICANT CHANGE UP
SPECIMEN SOURCE: SIGNIFICANT CHANGE UP
SPECIMEN SOURCE: SIGNIFICANT CHANGE UP

## 2018-06-22 NOTE — ASU PATIENT PROFILE, ADULT - TOBACCO USE
[facility] Medicine Services  PROGRESS NOTE    Patient Name: Harris Shane  : 9/15/1927  MRN: [id number]    Date of Admission: 2018  Length of Stay: 10  Primary Care Physician: Charli Mccormack MD    Subjective   Subjective     CC:  Severe weakness    Subjective:  Resting in bed in no acute distress. Tells me he is very comfortable however he is a bit drowsy.  still is very weak.  No F/C, no N/V or abdominal pain.  Review of Systems      Otherwise ROS is negative except as mentioned in the HPI.    Objective   Objective     Vital Signs:   Temp:  [98.3 °F (36.8 °C)-98.8 °F (37.1 °C)] 98.8 °F (37.1 °C)  Heart Rate:  [88] 88  Resp:  [18] 18  BP: (112-114)/(71-83) 114/71        Physical Exam:  Constitutional: No acute distress, A bit drowsy but arousable.  Eyes: PERRLA, sclerae anicteric, no conjunctival injection. Right eye ptosis and blindness.  HENT: NCAT, mucous membranes moist  Neck: Supple, no thyromegaly, no lymphadenopathy, trachea midline  Respiratory: Clear to auscultation bilaterally, nonlabored respirations   Cardiovascular: RRR, no murmurs, rubs, or gallops, palpable pedal pulses bilaterally  Gastrointestinal: Positive bowel sounds, soft, nontender, mildly distended  Musculoskeletal: No bilateral ankle edema, no clubbing or cyanosis to extremities  Psychiatric: Appropriate affect, cooperative  Neurologic: Awake, alert, follows commands.  Patient does have some weakness of lower extremities however he can lift her legs against gravity.  Patient does have difficulty with ambulating independently.  Skin: No rashes    Results Reviewed:  I have personally reviewed current lab, radiology, and data and agree.      Results from last 7 days  Lab Units 18  0914 18  0627 18  0508   WBC 10*3/mm3 11.34* 10.81* 9.35   HEMOGLOBIN g/dL 13.4 12.4* 11.7*   HEMATOCRIT % 40.4 37.4* 35.0*   PLATELETS 10*3/mm3 222 184 143*       Results from last 7 days  Lab Units  06/19/18  0914 06/18/18  0625 06/16/18  0508   SODIUM mmol/L  --  134 135   POTASSIUM mmol/L  --  4.3 3.4*   CHLORIDE mmol/L  --  101 102   CO2 mmol/L  --  28.0 26.0   BUN mg/dL  --  16 21   CREATININE mg/dL  --  0.82 0.88   GLUCOSE mg/dL  --  92 84   CALCIUM mg/dL  --  9.1 8.2*   ALT (SGPT) U/L 72* 74* 94*   AST (SGOT) U/L 48* 43* 46*     Estimated Creatinine Clearance: 64.4 mL/min (by C-G formula based on SCr of 0.82 mg/dL).  No results found for: BNP  No results found for: PHART    Microbiology Results Abnormal     Procedure Component Value - Date/Time    Blood Culture - Blood, [[id number]]  (Normal) Collected:  06/20/18 1740    Lab Status:  Preliminary result Specimen:  Blood from Arm, Left Updated:  06/21/18 1830     Blood Culture No growth at 24 hours    Blood Culture - Blood, [[id number]]  (Normal) Collected:  06/20/18 1745    Lab Status:  Preliminary result Specimen:  Blood from Wrist, Left Updated:  06/21/18 1830     Blood Culture No growth at 24 hours    Blood Culture - Blood, [[id number]]  (Normal) Collected:  06/12/18 2152    Lab Status:  Final result Specimen:  Blood from Arm, Left Updated:  06/17/18 2200     Blood Culture No growth at 5 days    Blood Culture - Blood, [[id number]]  (Abnormal)  (Susceptibility) Collected:  06/12/18 2152    Lab Status:  Final result Specimen:  Blood from Arm, Right Updated:  06/15/18 0728     Blood Culture Escherichia coli (A)     Isolated from Aerobic Bottle     Gram Stain Result Aerobic Bottle Gram negative bacilli    Susceptibility      Escherichia coli     SISSY     Ampicillin <=8 ug/ml Susceptible     Ampicillin + Sulbactam <=8/4 ug/ml Susceptible     Aztreonam <=8 ug/ml Susceptible     Cefepime <=8 ug/ml Susceptible     Cefotaxime <=2 ug/ml Susceptible     Ceftriaxone <=8 ug/ml Susceptible     Cefuroxime sodium <=4 ug/ml Susceptible     Ertapenem <=1 ug/ml Susceptible     Gentamicin <=4 ug/ml Susceptible     Levofloxacin <=2 ug/ml Susceptible     Meropenem <=1 ug/ml  Susceptible     Piperacillin + Tazobactam <=16 ug/ml Susceptible     Tetracycline <=4 ug/ml Susceptible     Tobramycin <=4 ug/ml Susceptible     Trimethoprim + Sulfamethoxazole <=2/38 ug/ml Susceptible                    Blood Culture ID, PCR - Blood, [[id number]]  (Abnormal) Collected:  06/12/18 2152    Lab Status:  Final result Specimen:  Blood from Arm, Right Updated:  06/13/18 1421     BCID, PCR Escherichia coli. Identification by BCID PCR. (C)          Imaging Results (last 24 hours)     ** No results found for the last 24 hours. **        Results for orders placed during the hospital encounter of 06/12/18   Adult Transthoracic Echo Complete W/ Cont if Necessary Per Protocol    Narrative · Left ventricular systolic function is normal. Estimated EF = 55%.  · Left ventricular diastolic dysfunction (grade I) consistent with   impaired relaxation.          I have reviewed the medications.    Assessment/Plan   Assessment / Plan     Hospital Problem List     * (Principal)Sepsis    Elevated liver enzymes    Prostate cancer (Mets to L4-L5)    Pulmonary infiltrate, LLLobe    Cholelithiases of GB neck per CT A/P    Acute renal insufficiency, CRE 1.43, unknown baseline    Blindness of right eye    WILLIAM (obstructive sleep apnea) remote, intolerant of CPAP    R/O Cholecystitis    NSTEMI (non-ST elevated myocardial infarction) (R/O Type 2)    Bacteremia due to Escherichia coli             Brief Hospital Course to date:  Harris Shane is a 89 y.o. male with past medical history significant for prostate cancer with metastases  to L4 on L5, blindness of right eye, weakness.  Patient was admitted to ICU for sepsis.      Assessment & Plan:  *Sepsis markedly improved.    * Escherichia coli bacteremia, has been on IV rocephin. Afebrile.    * Elevated liver enzymes, improving.    * Back pain that radiates to lower extremities particularly on the left side.  This is mainly secondary to metastatic disease in L4 and L5. Had radiation  to L4-S1 today.    * Severe generalized weakness particularly lower extremities.  Patient has significant difficulty with ambulating independently.  This weakness is partially secondary to metastatic disease to the spine.    * Metastatic prostate cancer.  Metastases to L4 on L5.  Patient was supposed to start radiation therapy for that.  We need to coordinate with radiation oncology for this.    * Blindness and ptosis of the right eye.    PLAN:  - cont current medication but DC ativan.  - We will transferred to rehabilitation when bed available.        DVT Prophylaxis:      CODE STATUS:   Code Status and Medical Interventions:   Ordered at: 06/19/18 0835     Code Status:    CPR     Medical Interventions (Level of Support Prior to Arrest):    Full       Disposition: TBD      Electronically signed by Sukhwinder Howell MD, 06/22/18, 4:33 PM.     Former smoker

## 2018-06-27 ENCOUNTER — TRANSCRIPTION ENCOUNTER (OUTPATIENT)
Age: 83
End: 2018-06-27

## 2018-06-27 ENCOUNTER — INPATIENT (INPATIENT)
Facility: HOSPITAL | Age: 83
LOS: 1 days | Discharge: HOME HEALTH | End: 2018-06-29
Attending: THORACIC SURGERY (CARDIOTHORACIC VASCULAR SURGERY) | Admitting: THORACIC SURGERY (CARDIOTHORACIC VASCULAR SURGERY)
Payer: MEDICARE

## 2018-06-27 ENCOUNTER — APPOINTMENT (OUTPATIENT)
Dept: THORACIC SURGERY | Facility: CLINIC | Age: 83
End: 2018-06-27
Payer: MEDICARE

## 2018-06-27 VITALS
HEART RATE: 80 BPM | RESPIRATION RATE: 16 BRPM | DIASTOLIC BLOOD PRESSURE: 66 MMHG | WEIGHT: 114 LBS | BODY MASS INDEX: 20.19 KG/M2 | SYSTOLIC BLOOD PRESSURE: 111 MMHG | OXYGEN SATURATION: 93 %

## 2018-06-27 VITALS
TEMPERATURE: 97 F | RESPIRATION RATE: 16 BRPM | HEART RATE: 87 BPM | DIASTOLIC BLOOD PRESSURE: 62 MMHG | SYSTOLIC BLOOD PRESSURE: 125 MMHG

## 2018-06-27 DIAGNOSIS — M12.9 ARTHROPATHY, UNSPECIFIED: Chronic | ICD-10-CM

## 2018-06-27 DIAGNOSIS — Z90.89 ACQUIRED ABSENCE OF OTHER ORGANS: Chronic | ICD-10-CM

## 2018-06-27 DIAGNOSIS — Z98.890 OTHER SPECIFIED POSTPROCEDURAL STATES: Chronic | ICD-10-CM

## 2018-06-27 DIAGNOSIS — Z95.5 PRESENCE OF CORONARY ANGIOPLASTY IMPLANT AND GRAFT: Chronic | ICD-10-CM

## 2018-06-27 DIAGNOSIS — K91.89 OTHER POSTPROCEDURAL COMPLICATIONS AND DISORDERS OF DIGESTIVE SYSTEM: Chronic | ICD-10-CM

## 2018-06-27 DIAGNOSIS — Z96.649 PRESENCE OF UNSPECIFIED ARTIFICIAL HIP JOINT: Chronic | ICD-10-CM

## 2018-06-27 DIAGNOSIS — C15.9 MALIGNANT NEOPLASM OF ESOPHAGUS, UNSPECIFIED: ICD-10-CM

## 2018-06-27 LAB
ALBUMIN SERPL ELPH-MCNC: 3 G/DL — LOW (ref 3.3–5)
ALP SERPL-CCNC: 274 U/L — HIGH (ref 40–120)
ALT FLD-CCNC: 35 U/L — SIGNIFICANT CHANGE UP (ref 4–41)
APTT BLD: 36.5 SEC — SIGNIFICANT CHANGE UP (ref 27.5–37.4)
AST SERPL-CCNC: 28 U/L — SIGNIFICANT CHANGE UP (ref 4–40)
BASOPHILS # BLD AUTO: 0.02 K/UL — SIGNIFICANT CHANGE UP (ref 0–0.2)
BASOPHILS NFR BLD AUTO: 0.3 % — SIGNIFICANT CHANGE UP (ref 0–2)
BILIRUB SERPL-MCNC: < 0.2 MG/DL — LOW (ref 0.2–1.2)
BLD GP AB SCN SERPL QL: NEGATIVE — SIGNIFICANT CHANGE UP
BUN SERPL-MCNC: 16 MG/DL — SIGNIFICANT CHANGE UP (ref 7–23)
CALCIUM SERPL-MCNC: 9.2 MG/DL — SIGNIFICANT CHANGE UP (ref 8.4–10.5)
CHLORIDE SERPL-SCNC: 102 MMOL/L — SIGNIFICANT CHANGE UP (ref 98–107)
CO2 SERPL-SCNC: 28 MMOL/L — SIGNIFICANT CHANGE UP (ref 22–31)
CREAT SERPL-MCNC: 0.61 MG/DL — SIGNIFICANT CHANGE UP (ref 0.5–1.3)
EOSINOPHIL # BLD AUTO: 0.06 K/UL — SIGNIFICANT CHANGE UP (ref 0–0.5)
EOSINOPHIL NFR BLD AUTO: 0.8 % — SIGNIFICANT CHANGE UP (ref 0–6)
GLUCOSE SERPL-MCNC: 80 MG/DL — SIGNIFICANT CHANGE UP (ref 70–99)
HCT VFR BLD CALC: 31.4 % — LOW (ref 39–50)
HGB BLD-MCNC: 9.9 G/DL — LOW (ref 13–17)
IMM GRANULOCYTES # BLD AUTO: 0.03 # — SIGNIFICANT CHANGE UP
IMM GRANULOCYTES NFR BLD AUTO: 0.4 % — SIGNIFICANT CHANGE UP (ref 0–1.5)
INR BLD: 1.04 — SIGNIFICANT CHANGE UP (ref 0.88–1.17)
LYMPHOCYTES # BLD AUTO: 0.6 K/UL — LOW (ref 1–3.3)
LYMPHOCYTES # BLD AUTO: 7.6 % — LOW (ref 13–44)
MCHC RBC-ENTMCNC: 29.1 PG — SIGNIFICANT CHANGE UP (ref 27–34)
MCHC RBC-ENTMCNC: 31.5 % — LOW (ref 32–36)
MCV RBC AUTO: 92.4 FL — SIGNIFICANT CHANGE UP (ref 80–100)
MONOCYTES # BLD AUTO: 0.43 K/UL — SIGNIFICANT CHANGE UP (ref 0–0.9)
MONOCYTES NFR BLD AUTO: 5.5 % — SIGNIFICANT CHANGE UP (ref 2–14)
NEUTROPHILS # BLD AUTO: 6.74 K/UL — SIGNIFICANT CHANGE UP (ref 1.8–7.4)
NEUTROPHILS NFR BLD AUTO: 85.4 % — HIGH (ref 43–77)
NRBC # FLD: 0 — SIGNIFICANT CHANGE UP
PLATELET # BLD AUTO: 188 K/UL — SIGNIFICANT CHANGE UP (ref 150–400)
PMV BLD: 9.2 FL — SIGNIFICANT CHANGE UP (ref 7–13)
POTASSIUM SERPL-MCNC: 4 MMOL/L — SIGNIFICANT CHANGE UP (ref 3.5–5.3)
POTASSIUM SERPL-SCNC: 4 MMOL/L — SIGNIFICANT CHANGE UP (ref 3.5–5.3)
PROT SERPL-MCNC: 6.7 G/DL — SIGNIFICANT CHANGE UP (ref 6–8.3)
PROTHROM AB SERPL-ACNC: 11.5 SEC — SIGNIFICANT CHANGE UP (ref 9.8–13.1)
RBC # BLD: 3.4 M/UL — LOW (ref 4.2–5.8)
RBC # FLD: 19.9 % — HIGH (ref 10.3–14.5)
RH IG SCN BLD-IMP: POSITIVE — SIGNIFICANT CHANGE UP
SODIUM SERPL-SCNC: 141 MMOL/L — SIGNIFICANT CHANGE UP (ref 135–145)
WBC # BLD: 7.88 K/UL — SIGNIFICANT CHANGE UP (ref 3.8–10.5)
WBC # FLD AUTO: 7.88 K/UL — SIGNIFICANT CHANGE UP (ref 3.8–10.5)

## 2018-06-27 PROCEDURE — 71250 CT THORAX DX C-: CPT | Mod: 26

## 2018-06-27 PROCEDURE — 99215 OFFICE O/P EST HI 40 MIN: CPT | Mod: PD

## 2018-06-27 RX ORDER — LEVOTHYROXINE SODIUM 125 MCG
25 TABLET ORAL
Qty: 0 | Refills: 0 | Status: DISCONTINUED | OUTPATIENT
Start: 2018-06-27 | End: 2018-06-27

## 2018-06-27 RX ORDER — LEVOTHYROXINE SODIUM 125 MCG
25 TABLET ORAL
Qty: 0 | Refills: 0 | Status: DISCONTINUED | OUTPATIENT
Start: 2018-06-27 | End: 2018-06-28

## 2018-06-27 RX ORDER — SUCRALFATE 1 G
1 TABLET ORAL
Qty: 0 | Refills: 0 | Status: DISCONTINUED | OUTPATIENT
Start: 2018-06-27 | End: 2018-06-27

## 2018-06-27 RX ORDER — LEVOTHYROXINE SODIUM 125 MCG
12.5 TABLET ORAL
Qty: 0 | Refills: 0 | Status: DISCONTINUED | OUTPATIENT
Start: 2018-06-27 | End: 2018-06-28

## 2018-06-27 RX ORDER — AMITRIPTYLINE HCL 25 MG
10 TABLET ORAL THREE TIMES A DAY
Qty: 0 | Refills: 0 | Status: DISCONTINUED | OUTPATIENT
Start: 2018-06-27 | End: 2018-06-27

## 2018-06-27 RX ORDER — METOPROLOL TARTRATE 50 MG
0 TABLET ORAL
Qty: 0 | Refills: 0 | COMMUNITY

## 2018-06-27 RX ORDER — HYDROCORTISONE 20 MG
20 TABLET ORAL DAILY
Qty: 0 | Refills: 0 | Status: DISCONTINUED | OUTPATIENT
Start: 2018-06-27 | End: 2018-06-29

## 2018-06-27 RX ORDER — TAMSULOSIN HYDROCHLORIDE 0.4 MG/1
0.4 CAPSULE ORAL AT BEDTIME
Qty: 0 | Refills: 0 | Status: DISCONTINUED | OUTPATIENT
Start: 2018-06-27 | End: 2018-06-27

## 2018-06-27 RX ORDER — MIDODRINE HYDROCHLORIDE 2.5 MG/1
10 TABLET ORAL
Qty: 0 | Refills: 0 | Status: DISCONTINUED | OUTPATIENT
Start: 2018-06-27 | End: 2018-06-27

## 2018-06-27 RX ORDER — OLMESARTAN MEDOXOMIL 5 MG/1
20 TABLET, FILM COATED ORAL
Qty: 0 | Refills: 0 | COMMUNITY

## 2018-06-27 RX ORDER — MONTELUKAST 4 MG/1
10 TABLET, CHEWABLE ORAL DAILY
Qty: 0 | Refills: 0 | Status: DISCONTINUED | OUTPATIENT
Start: 2018-06-27 | End: 2018-06-29

## 2018-06-27 RX ORDER — SUCRALFATE 1 G
0 TABLET ORAL
Qty: 0 | Refills: 0 | COMMUNITY

## 2018-06-27 RX ORDER — SIMVASTATIN 20 MG/1
40 TABLET, FILM COATED ORAL AT BEDTIME
Qty: 0 | Refills: 0 | Status: DISCONTINUED | OUTPATIENT
Start: 2018-06-27 | End: 2018-06-27

## 2018-06-27 RX ORDER — ONDANSETRON 8 MG/1
4 TABLET, FILM COATED ORAL EVERY 8 HOURS
Qty: 0 | Refills: 0 | Status: DISCONTINUED | OUTPATIENT
Start: 2018-06-27 | End: 2018-06-27

## 2018-06-27 RX ORDER — ASPIRIN/CALCIUM CARB/MAGNESIUM 324 MG
0 TABLET ORAL
Qty: 0 | Refills: 0 | COMMUNITY

## 2018-06-27 RX ORDER — ALPRAZOLAM 0.25 MG
0.25 TABLET ORAL THREE TIMES A DAY
Qty: 0 | Refills: 0 | Status: DISCONTINUED | OUTPATIENT
Start: 2018-06-27 | End: 2018-06-27

## 2018-06-27 RX ORDER — SODIUM CHLORIDE 9 MG/ML
1000 INJECTION, SOLUTION INTRAVENOUS
Qty: 0 | Refills: 0 | Status: DISCONTINUED | OUTPATIENT
Start: 2018-06-27 | End: 2018-06-29

## 2018-06-27 RX ORDER — LEVOTHYROXINE SODIUM 125 MCG
50 TABLET ORAL
Qty: 0 | Refills: 0 | Status: DISCONTINUED | OUTPATIENT
Start: 2018-06-27 | End: 2018-06-27

## 2018-06-27 RX ORDER — ALPRAZOLAM 0.25 MG
2 TABLET ORAL
Qty: 0 | Refills: 0 | COMMUNITY

## 2018-06-27 RX ADMIN — Medication 104 MILLIGRAM(S): at 23:52

## 2018-06-27 RX ADMIN — Medication 20 MILLIGRAM(S): at 23:50

## 2018-06-27 RX ADMIN — Medication 0.5 MILLIGRAM(S): at 23:47

## 2018-06-27 RX ADMIN — SODIUM CHLORIDE 75 MILLILITER(S): 9 INJECTION, SOLUTION INTRAVENOUS at 21:59

## 2018-06-27 NOTE — ED PROVIDER NOTE - PMH
Anxiety    Asthma    BCC (basal cell carcinoma)  skin  BPH (benign prostatic hyperplasia)    Cerebrovascular accident (CVA)    Chronic allergic rhinitis    Coronary artery disease  s/p 3 stents on June 30, 2017 at [place] Dr. Lavelle Reid  Esophagus cancer    Former smoker, stopped smoking in distant past    HTN (hypertension)    Hyperlipidemia    OA (osteoarthritis) of knee  right  Rheumatoid arthritis    Seizure disorder  1 episode approximately 15 yrs ago

## 2018-06-27 NOTE — ED PROVIDER NOTE - ATTENDING CONTRIBUTION TO CARE
Patient with h/o esoph ca s/p esophagogastrectomy presenting for dysphagia x 3 d to both solids/liquids. Also notes weight loss over last few months. No associated fevers, chills, ha, cp, sob, abd pain, vomiting, diarrhea, dysuria. Sent by dr. mechelle obrien for admission for egd and dilation.  exam  GEN - NAD; A+O x3   HEAD - NC/AT   EYES- PERRL, EOMI  ENT: Airway patent, mmm, Oral cavity and pharynx normal. No inflammation, swelling, exudate, or lesions.  NECK: Neck supple, non-tender without lymphadenopathy, no masses.  PULMONARY - CTA b/l, symmetric breath sounds.   CARDIAC -s1s2, RRR, no M,G,R  ABDOMEN - +BS, ND, NT, soft, no guarding, no rebound, no masses   BACK - no CVA tenderness, Normal  spine   EXTREMITIES - FROM, symmetric pulses, capillary refill < 2 seconds, no edema   SKIN - no rash or bruising   NEUROLOGIC - alert, speech clear, no focal deficits  PSYCH -nl mood/affect, nl insight.  a/p-patient with dysphagia x 3 days with h/o esophageal ca s/p esophagogastrectomy, sent for adm for egd and dilation by his ct surgeon dr. obrien. Patient ao3, nontoxic appearing, vss, abd nontender, per ct surg-req admission to their service, ct chest in ed to further eval symptoms, labs. Patient agreeable to plan

## 2018-06-27 NOTE — H&P ADULT - NSHPLABSRESULTS_GEN_ALL_CORE
LABS:                        9.9    7.88  )-----------( 188      ( 27 Jun 2018 14:10 )             31.4     06-27    141  |  102  |  16  ----------------------------<  80  4.0   |  28  |  0.61    Ca    9.2      27 Jun 2018 14:10    TPro  6.7  /  Alb  3.0<L>  /  TBili  < 0.2<L>  /  DBili  x   /  AST  28  /  ALT  35  /  AlkPhos  274<H>  06-27    PT/INR - ( 27 Jun 2018 14:32 )   PT: 11.5 SEC;   INR: 1.04          PTT - ( 27 Jun 2018 14:32 )  PTT:36.5 SEC      RADIOLOGY & ADDITIONAL STUDIES:  < from: CT Chest No Cont (06.27.18 @ 18:05) >  IMPRESSION:   1.  Distended upper gastric pull-through filled with debris and high   density fluid.   2.  Mucus plugging of the bronchus intermedius and right lower lobe   bronchi with right lower lobe linear atelectasis.  3.  Small nodular opacities in the right upper lobe, possibly infectious.    < end of copied text >

## 2018-06-27 NOTE — ED PROVIDER NOTE - CARE PLAN
Principal Discharge DX:	Esophagus cancer Principal Discharge DX:	Esophagus cancer  Secondary Diagnosis:	Dysphagia, unspecified type

## 2018-06-27 NOTE — H&P ADULT - HISTORY OF PRESENT ILLNESS
HPI:  84 y/o M, former smoker, w/ hx of HTN and TIA in 2000's w/ no residuals but developed Seizures (on medication), CAD s/p stents on 6/2017, and lower third Esophageal CA.  Pt had completed neoadj Chemo (5 cycles) and XRT (25 sessions) end of May, 2017.   Repeat EGD Bx "was negative for malignancy", suggesting complete response.  Restaging PET/CT on 7/11/17 showed resolution of FDG avidity in the distal esophagus.  Now 10mo s/p Rt VATS, Robotic-assisted, MLND, wedge rxn of RLL, EGD, LAP, [place]Trent esophagogastrectomy, feeding tube placement on 8/14/17 (Joint case with Dr. Ganesh Dietz).  Path revealed AdenoCA involving the GE junction, 0.3cm, G2, tumor invades lamina propria, LNs (0/13) negative, margins negative, [id number], single cells or rare small groups of cancer cells (near complete response, score 1). HER2 negative. RLL wedge negative malignancy.  Post-op complicated by a leak confirmed w/ CT chest, GINA drain placed, eventually healed up, J.P drained removed on 10/24/17. J-tube fell out on the same day.   Pt developed dysphagia, s/p EGD dilation up to 48 [place] on 2/8/18. Post-op noted some bleeding (Pt did not hold Aspirin and Plavix prior to dilation), received 1 unit of PRBC, discharged w/ a stable Hct.  Pt c/o dysphagia x few days, not able to eat anything since yesterday, significant wt loss.    General: WN/WD NAD  Neurology: Awake, nonfocal, MAI x 4  Eyes: Scleras clear, PERRLA/ EOMI, Gross vision intact  ENT:Gross hearing intact, grossly patent pharynx, no stridor  Neck: Neck supple, trachea midline, No JVD,   Respiratory: CTA B/L, No wheezing, rales, rhonchi  CV: RRR, S1S2, no murmurs, rubs or gallops  Abdominal: Soft, NT, ND +BS,   Extremities: No edema, + peripheral pulses  Skin: No Rashes, Hematoma, Ecchymosis  Lymphatic: No Neck, axilla, groin LAD  Psych: Oriented x 3, normal affect      [facility]   [facility]:  Coronary artery disease: s/p 3 stents on June 30, 2017 at [place] Dr. Lavelle Reid  OA (osteoarthritis) of knee: right  Former smoker, stopped smoking in distant past  Rheumatoid arthritis  Seizure disorder: 1 episode approximately 15 yrs ago  Asthma  Hyperlipidemia  BPH (benign prostatic hyperplasia)  HTN (hypertension)  Esophagus cancer  Chronic allergic rhinitis  BCC (basal cell carcinoma): skin  Cerebrovascular accident (CVA)  Anxiety  Anastomotic leak following esophagectomy: [place] trent esophagectomy  History of tonsillectomy  H/O heart artery stent: June 30, 2017  Knee arthropathy: left  History of total hip replacement: left  History of hernia repair  HEALTH ISSUES - PROBLEM Dx:      HEALTH ISSUES - R/O PROBLEM Dx:

## 2018-06-27 NOTE — ED PROVIDER NOTE - MEDICAL DECISION MAKING DETAILS
83M known esophageal mass here c/o difficulty swallowing solids and liquids x 3 days. Pt thin, pale, nontoxic appearing. BS clear with weak inspiratory effort. RRR S1S2. abdomen soft nt nd. Sent in by Christian Weinstein of [facility] for admission. Per his request will check ct chest, cbc, cmp, coags, type&screen and admit to [facility].

## 2018-06-27 NOTE — ED PROVIDER NOTE - OBJECTIVE STATEMENT
84yo male HTN, CVA, seizures on dilantin, RA, CAD s/p 6 stents, Esophageal CA s/p chemo/RT and esophagogastrectomy, wheelchair bound p/w dysphagia x 2-3 days to solids/liquids. Every time he tries to swallow something he feels it gets stuck in his throat and he has some associated chest pain. Pt denies recent f/c, cough, ab pain/n/v/d. Denies urinary symptoms but does endorse occasional dark stools.

## 2018-06-27 NOTE — ED ADULT NURSE NOTE - OBJECTIVE STATEMENT
Break RN: Pt rcvd in room 14, aox3, baseline ambulatory with a walker, presents to the ed with c.o trouble swallowing, pt with h/o esophageal ca, had esophagus removed in August 2018 and has been having trouble swallowing since then but for the past few days, has been getting worse, now not able to keep fluids down, with weight loss and weakness. As per wife, pt due for a procedure tomorrow to check his esophagus. MD willoughby pending. Wife at bedside, NAD, will monitor.

## 2018-06-27 NOTE — H&P ADULT - ASSESSMENT
PLAN:  82yo male HTN, CVA, seizures on dilantin, RA, CAD s/p 6 stents, Esophageal CA s/p chemo/RT and esophagogastrectomy, wheelchair bound p/w dysphagia x 2-3 days to solids/liquids.  -Admit to 8T  -CT Chest completed  - CBC, BMP, Type and Screen  - Plan for OR tomorrow for EGD, Dilitation

## 2018-06-27 NOTE — H&P ADULT - PMH
Anxiety    Asthma    BCC (basal cell carcinoma)  skin  BPH (benign prostatic hyperplasia)    Cerebrovascular accident (CVA)    Chronic allergic rhinitis    Coronary artery disease  s/p 3 stents on June 30, 2017 at [place] Dr. Lavelle Reid  Esophagus cancer    Former smoker, stopped smoking in distant past    HTN (hypertension)    Hyperlipidemia    OA (osteoarthritis) of knee  right  Rheumatoid arthritis    Seizure disorder  1 episode approximately 15 yrs ago

## 2018-06-27 NOTE — H&P ADULT - NSHPREVIEWOFSYSTEMS_GEN_ALL_CORE
General: Weight loss, Fatigue/ No HA/Dizzy	  Skin/Breast: No Rashes/ Lesions/ Masses	  Ophthalmologic: No Blurry vision/ Glaucoma/ Blindness	  ENMT: No Hearing loss/ Drainage/ Lesions	  Respiratory and Thorax: No Cough/ Wheezing/ SOB/ Hemoptysis/ Sputum production  Cardiovascular:  Chest pain, No Palpitations/ Diaphoresis	  Gastrointestinal: Dysphagia, No Nausea/ Vomiting/ Constipation/ Appetite Change	  Genitourinary: No Heamturia/ Dysuria/ Frequency change/ Impotence	  Musculoskeletal: No Pain/ Weakness/ Claudication	  Neurological: No Seizures/ TIA/CVA/ Parastesias	  Psychiatric: No Dementia/ Depression/ SI/HI	  Hematology/Lymphatics: No hx of bleeding/ Edema	  Endocrine:	No Hyperglycemia/ Hypoglycemia  Allergic/Immunologic:	 No Anaphylaxis/ Intolerance/ Recent illnesses

## 2018-06-27 NOTE — H&P ADULT - NSHPPHYSICALEXAM_GEN_ALL_CORE
General: WN/WD NAD  Neurology: Awake, nonfocal, MAI x 4  Eyes: Scleras clear, PERRLA/ EOMI, Gross vision intact  ENT:Gross hearing intact, grossly patent pharynx, no stridor  Neck: Neck supple, trachea midline, No JVD,   Respiratory: CTA B/L, No wheezing, rales, rhonchi  CV: RRR, S1S2, no murmurs, rubs or gallops  Abdominal: Soft, NT, ND +BS,   Extremities: No edema, + peripheral pulses  Skin: No Rashes, Hematoma, Ecchymosis  Lymphatic: No Neck, axilla, groin LAD  Psych: Oriented x 3, normal affect

## 2018-06-27 NOTE — ED ADULT TRIAGE NOTE - CHIEF COMPLAINT QUOTE
Sent in by MD for admission. Pt. has been having difficulty swallowing with recent weight loss and weakness. h/o esophageal ca with esophagectomy. Denies n/v/d or fevers. Unable to get SpO2 in triage, respirations are even and unlabored.

## 2018-06-28 ENCOUNTER — APPOINTMENT (OUTPATIENT)
Dept: THORACIC SURGERY | Facility: HOSPITAL | Age: 83
End: 2018-06-28

## 2018-06-28 PROCEDURE — 43248 EGD GUIDE WIRE INSERTION: CPT

## 2018-06-28 RX ORDER — PANTOPRAZOLE SODIUM 20 MG/1
40 TABLET, DELAYED RELEASE ORAL
Qty: 0 | Refills: 0 | Status: DISCONTINUED | OUTPATIENT
Start: 2018-06-29 | End: 2018-06-29

## 2018-06-28 RX ORDER — ALPRAZOLAM 0.25 MG
0.25 TABLET ORAL EVERY 8 HOURS
Qty: 0 | Refills: 0 | Status: DISCONTINUED | OUTPATIENT
Start: 2018-06-28 | End: 2018-06-29

## 2018-06-28 RX ORDER — PANTOPRAZOLE SODIUM 20 MG/1
40 TABLET, DELAYED RELEASE ORAL ONCE
Qty: 0 | Refills: 0 | Status: COMPLETED | OUTPATIENT
Start: 2018-06-28 | End: 2018-06-28

## 2018-06-28 RX ORDER — LEVOTHYROXINE SODIUM 125 MCG
50 TABLET ORAL
Qty: 0 | Refills: 0 | Status: DISCONTINUED | OUTPATIENT
Start: 2018-06-28 | End: 2018-06-29

## 2018-06-28 RX ORDER — LEVOTHYROXINE SODIUM 125 MCG
25 TABLET ORAL DAILY
Qty: 0 | Refills: 0 | Status: DISCONTINUED | OUTPATIENT
Start: 2018-06-28 | End: 2018-06-29

## 2018-06-28 RX ORDER — SUCRALFATE 1 G
1 TABLET ORAL
Qty: 0 | Refills: 0 | Status: DISCONTINUED | OUTPATIENT
Start: 2018-06-28 | End: 2018-06-29

## 2018-06-28 RX ADMIN — Medication 1 GRAM(S): at 23:00

## 2018-06-28 RX ADMIN — Medication 0.5 MILLIGRAM(S): at 05:10

## 2018-06-28 RX ADMIN — Medication 100 MILLIGRAM(S): at 23:00

## 2018-06-28 RX ADMIN — SODIUM CHLORIDE 75 MILLILITER(S): 9 INJECTION, SOLUTION INTRAVENOUS at 14:11

## 2018-06-28 RX ADMIN — Medication 104 MILLIGRAM(S): at 05:10

## 2018-06-28 RX ADMIN — Medication 20 MILLIGRAM(S): at 05:11

## 2018-06-28 RX ADMIN — Medication 25 MICROGRAM(S): at 07:51

## 2018-06-28 RX ADMIN — Medication 1 GRAM(S): at 17:44

## 2018-06-28 RX ADMIN — Medication 104 MILLIGRAM(S): at 17:44

## 2018-06-28 RX ADMIN — Medication 104 MILLIGRAM(S): at 12:02

## 2018-06-28 RX ADMIN — MONTELUKAST 10 MILLIGRAM(S): 4 TABLET, CHEWABLE ORAL at 17:44

## 2018-06-28 RX ADMIN — PANTOPRAZOLE SODIUM 40 MILLIGRAM(S): 20 TABLET, DELAYED RELEASE ORAL at 14:11

## 2018-06-28 RX ADMIN — Medication 0.25 MILLIGRAM(S): at 18:24

## 2018-06-28 NOTE — PHYSICAL THERAPY INITIAL EVALUATION ADULT - ADDITIONAL COMMENTS
Pt reports that he lives in an apartment with wife with no steps to negotiate; elevator inside. Prior to hospital admission pt ambulated with assist using rolling walker. Pt has a home health aide 5 days/week 4 hours/day. Pt denies any recent falls.    Pt left comfortable in bed, NAD, all lines intact, all precautions maintained, with call bell in reach, wife @bedside, and RN aware of PT evaluation.

## 2018-06-28 NOTE — PHYSICAL THERAPY INITIAL EVALUATION ADULT - ACTIVE RANGE OF MOTION EXAMINATION, REHAB EVAL
lori. upper extremity Active ROM was WNL (within normal limits)/bilateral lower extremity Active ROM was WNL (within normal limits)

## 2018-06-28 NOTE — CONSULT NOTE ADULT - SUBJECTIVE AND OBJECTIVE BOX
Patient is a 83y Male     Patient is a 83y old  Male who presents with a chief complaint of feeding tube clogged s/p maryjane trent (27 Jun 2018 22:23)      HPI:  HPI:  82 y/o M, former smoker, w/ hx of HTN and TIA in 2000's w/ no residuals but developed Seizures (on medication), CAD s/p stents on 6/2017, and lower third Esophageal CA.  Pt had completed neoadj Chemo (5 cycles) and XRT (25 sessions) end of May, 2017.   Repeat EGD Bx "was negative for malignancy", suggesting complete response.  Restaging PET/CT on 7/11/17 showed resolution of FDG avidity in the distal esophagus.  Now 10mo s/p Rt VATS, Robotic-assisted, MLND, wedge rxn of RLL, EGD, LAP, Maryjane-Trent esophagogastrectomy, feeding tube placement on 8/14/17 (Joint case with Dr. Ganesh Dietz).  Path revealed AdenoCA involving the GE junction, 0.3cm, G2, tumor invades lamina propria, LNs (0/13) negative, margins negative, [id number], single cells or rare small groups of cancer cells (near complete response, score 1). HER2 negative. RLL wedge negative malignancy.  Post-op complicated by a leak confirmed w/ CT chest, GINA drain placed, eventually healed up, J.P drained removed on 10/24/17. J-tube fell out on the same day.   Pt developed dysphagia, s/p EGD dilation up to 48 Brazilian on 2/8/18. Post-op noted some bleeding (Pt did not hold Aspirin and Plavix prior to dilation), received 1 unit of PRBC, discharged w/ a stable Hct.  Pt c/o dysphagia x few days, not able to eat anything since yesterday, significant wt loss.    General: WN/WD NAD  Neurology: Awake, nonfocal, MAI x 4  Eyes: Scleras clear, PERRLA/ EOMI, Gross vision intact  ENT:Gross hearing intact, grossly patent pharynx, no stridor  Neck: Neck supple, trachea midline, No JVD,   Respiratory: CTA B/L, No wheezing, rales, rhonchi  CV: RRR, S1S2, no murmurs, rubs or gallops  Abdominal: Soft, NT, ND +BS,   Extremities: No edema, + peripheral pulses  Skin: No Rashes, Hematoma, Ecchymosis  Lymphatic: No Neck, axilla, groin LAD  Psych: Oriented x 3, normal affect      ASSESSMENT:   83yMalePAST MEDICAL & SURGICAL HISTORY:  Coronary artery disease: s/p 3 stents on June 30, 2017 at [place] Dr. Lavelle Reid  OA (osteoarthritis) of knee: right  Former smoker, stopped smoking in distant past  Rheumatoid arthritis  Seizure disorder: 1 episode approximately 15 yrs ago  Asthma  Hyperlipidemia  BPH (benign prostatic hyperplasia)  HTN (hypertension)  Esophagus cancer  Chronic allergic rhinitis  BCC (basal cell carcinoma): skin  Cerebrovascular accident (CVA)  Anxiety  Anastomotic leak following esophagectomy: [place] trent esophagectomy  History of tonsillectomy  H/O heart artery stent: June 30, 2017  Knee arthropathy: left  History of total hip replacement: left  History of hernia repair  HEALTH ISSUES - PROBLEM Dx:      HEALTH ISSUES - R/O PROBLEM Dx: (27 Jun 2018 19:40)      PAST MEDICAL & SURGICAL HISTORY:  Coronary artery disease: s/p 3 stents on June 30, 2017 at [place] Dr. Lavelle Reid  OA (osteoarthritis) of knee: right  Former smoker, stopped smoking in distant past  Rheumatoid arthritis  Seizure disorder: 1 episode approximately 15 yrs ago  Asthma  Hyperlipidemia  BPH (benign prostatic hyperplasia)  HTN (hypertension)  Esophagus cancer  Chronic allergic rhinitis  BCC (basal cell carcinoma): skin  Cerebrovascular accident (CVA)  Anxiety  Anastomotic leak following esophagectomy: [place] trent esophagectomy  History of tonsillectomy  H/O heart artery stent: June 30, 2017  Knee arthropathy: left  History of total hip replacement: left  History of hernia repair      MEDICATIONS  (STANDING):  dextrose 5% + sodium chloride 0.45%. 1000 milliLiter(s) (75 mL/Hr) IV Continuous <Continuous>  hydrocortisone sodium succinate Injectable 20 milliGRAM(s) IV Push daily  levothyroxine Injectable 12.5 MICROGram(s) IV Push <User Schedule>  levothyroxine Injectable 25 MICROGram(s) IV Push <User Schedule>  LORazepam   Injectable 0.5 milliGRAM(s) IV Push two times a day  montelukast 10 milliGRAM(s) Oral daily  phenytoin  IVPB 100 milliGRAM(s) IV Intermittent four times a day      Allergies    penicillin (Rash)    Intolerances        SOCIAL HISTORY:  Denies ETOh,Smoking,     FAMILY HISTORY:  Family history of heart attack  Family history of pulmonary embolism: father @ 49 yr old  FH: CAD (coronary artery disease) (Sibling)      REVIEW OF SYSTEMS:    CONSTITUTIONAL: No weakness, fevers or chills  EYES/ENT: No visual changes;  No vertigo or throat pain   NECK: No pain or stiffness  RESPIRATORY: No cough, wheezing, hemoptysis; No shortness of breath  CARDIOVASCULAR: No chest pain or palpitations  GASTROINTESTINAL: No abdominal or epigastric pain. No nausea, vomiting, or hematemesis; No diarrhea or constipation. No melena or hematochezia.  GENITOURINARY: No dysuria, frequency or hematuria  NEUROLOGICAL: No numbness or weakness  SKIN: No itching, burning, rashes, or lesions   All other review of systems is negative unless indicated above.    VITAL:  T(C): , Max: 36.7 (06-27-18 @ 19:11)  T(F): , Max: 98.1 (06-27-18 @ 19:11)  HR: 71 (06-28-18 @ 09:45)  BP: 110/56 (06-28-18 @ 09:45)  BP(mean): --  RR: 16 (06-28-18 @ 09:45)  SpO2: 99% (06-28-18 @ 09:45)  Wt(kg): --    I and O's:    06-27 @ 07:01  -  06-28 @ 07:00  --------------------------------------------------------  IN: 150 mL / OUT: 350 mL / NET: -200 mL      Height (cm): 160.02 (06-28 @ 09:21)  Weight (kg): 51.7 (06-28 @ 09:21)  BMI (kg/m2): 20.2 (06-28 @ 09:21)  BSA (m2): 1.52 (06-28 @ 09:21)    PHYSICAL EXAM:    Constitutional: NAD  HEENT: PERRLA,   Neck: No JVD  Respiratory: CTA B/L  Cardiovascular: S1 and S2  Gastrointestinal: BS+, soft, NT/ND  Extremities: No peripheral edema  Neurological: A/O x 3, no focal deficits  Psychiatric: Normal mood, normal affect  : No López  Skin: No rashes  Access: Not applicable  Back: No CVA tenderness    LABS:                        9.9    7.88  )-----------( 188      ( 27 Jun 2018 14:10 )             31.4     06-27    141  |  102  |  16  ----------------------------<  80  4.0   |  28  |  0.61    Ca    9.2      27 Jun 2018 14:10    TPro  6.7  /  Alb  3.0<L>  /  TBili  < 0.2<L>  /  DBili  x   /  AST  28  /  ALT  35  /  AlkPhos  274<H>  06-27          RADIOLOGY & ADDITIONAL STUDIES:

## 2018-06-28 NOTE — PHYSICAL THERAPY INITIAL EVALUATION ADULT - DIAGNOSIS, PT EVAL
Pt s/p EGD and dilation on 06/28/2018; pt presents with decreased strength, decreased aerobic capacity/endurance, and decreased balance.

## 2018-06-28 NOTE — PHYSICAL THERAPY INITIAL EVALUATION ADULT - PATIENT PROFILE REVIEW, REHAB EVAL
yes/ACTIVITY: Ambulate with Assistance; spoke with RN Manuel Mcclain prior to PT evaluation--> Pt OK for PT consult/OOB activity

## 2018-06-28 NOTE — PROGRESS NOTE ADULT - SUBJECTIVE AND OBJECTIVE BOX
Pt is without complaint s/p EGD dilation.  He is tolerating his clear liquid diet and he voided.  No c/o hemoptysis or hematemesis    VSS    Heart S1, S2  Lungs CTA  Abd Soft    stable s/p EGD dilation    Plan for AM CBC  Advance diet to soft in AM  Possible discharge home in AM

## 2018-06-28 NOTE — PHYSICAL THERAPY INITIAL EVALUATION ADULT - GENERAL OBSERVATIONS, REHAB EVAL
Pt encountered in semisupine position, no distress, AxOx4, with +IV, +cardiac monitor, and wife @bedside.

## 2018-06-28 NOTE — PHYSICAL THERAPY INITIAL EVALUATION ADULT - PERTINENT HX OF CURRENT PROBLEM, REHAB EVAL
84yo male HTN, CVA, seizures on dilantin, RA, CAD s/p 6 stents, Esophageal CA s/p chemo/RT and esophagogastrectomy, p/w dysphagia x 2-3 days to solids/liquids.

## 2018-06-28 NOTE — BRIEF OPERATIVE NOTE - PROCEDURE
<<-----Click on this checkbox to enter Procedure EGD  06/28/2018  balloon dilation up to 18mm of anastomosis  Active  NLE

## 2018-06-28 NOTE — CONSULT NOTE ADULT - ASSESSMENT
pt is my office patient, called by pt/ wife.  has dysphagia.  pt for egd possible dilation with surgery.  agree with plan.  will follow with you.

## 2018-06-29 ENCOUNTER — TRANSCRIPTION ENCOUNTER (OUTPATIENT)
Age: 83
End: 2018-06-29

## 2018-06-29 VITALS
HEART RATE: 60 BPM | TEMPERATURE: 97 F | SYSTOLIC BLOOD PRESSURE: 112 MMHG | DIASTOLIC BLOOD PRESSURE: 56 MMHG | OXYGEN SATURATION: 100 % | RESPIRATION RATE: 18 BRPM

## 2018-06-29 LAB
BUN SERPL-MCNC: 9 MG/DL — SIGNIFICANT CHANGE UP (ref 7–23)
CALCIUM SERPL-MCNC: 8.7 MG/DL — SIGNIFICANT CHANGE UP (ref 8.4–10.5)
CHLORIDE SERPL-SCNC: 102 MMOL/L — SIGNIFICANT CHANGE UP (ref 98–107)
CO2 SERPL-SCNC: 22 MMOL/L — SIGNIFICANT CHANGE UP (ref 22–31)
CREAT SERPL-MCNC: 0.52 MG/DL — SIGNIFICANT CHANGE UP (ref 0.5–1.3)
GLUCOSE SERPL-MCNC: 87 MG/DL — SIGNIFICANT CHANGE UP (ref 70–99)
HCT VFR BLD CALC: 25.7 % — LOW (ref 39–50)
HCT VFR BLD CALC: 33.6 % — LOW (ref 39–50)
HGB BLD-MCNC: 10.7 G/DL — LOW (ref 13–17)
HGB BLD-MCNC: 8.4 G/DL — LOW (ref 13–17)
MCHC RBC-ENTMCNC: 29.9 PG — SIGNIFICANT CHANGE UP (ref 27–34)
MCHC RBC-ENTMCNC: 30 PG — SIGNIFICANT CHANGE UP (ref 27–34)
MCHC RBC-ENTMCNC: 31.8 % — LOW (ref 32–36)
MCHC RBC-ENTMCNC: 32.7 % — SIGNIFICANT CHANGE UP (ref 32–36)
MCV RBC AUTO: 91.5 FL — SIGNIFICANT CHANGE UP (ref 80–100)
MCV RBC AUTO: 94.1 FL — SIGNIFICANT CHANGE UP (ref 80–100)
NRBC # FLD: 0 — SIGNIFICANT CHANGE UP
NRBC # FLD: 0 — SIGNIFICANT CHANGE UP
PLATELET # BLD AUTO: 161 K/UL — SIGNIFICANT CHANGE UP (ref 150–400)
PLATELET # BLD AUTO: 175 K/UL — SIGNIFICANT CHANGE UP (ref 150–400)
PMV BLD: 9.3 FL — SIGNIFICANT CHANGE UP (ref 7–13)
PMV BLD: 9.5 FL — SIGNIFICANT CHANGE UP (ref 7–13)
POTASSIUM SERPL-MCNC: 3.8 MMOL/L — SIGNIFICANT CHANGE UP (ref 3.5–5.3)
POTASSIUM SERPL-SCNC: 3.8 MMOL/L — SIGNIFICANT CHANGE UP (ref 3.5–5.3)
RBC # BLD: 2.81 M/UL — LOW (ref 4.2–5.8)
RBC # BLD: 3.57 M/UL — LOW (ref 4.2–5.8)
RBC # FLD: 18.3 % — HIGH (ref 10.3–14.5)
RBC # FLD: 19.1 % — HIGH (ref 10.3–14.5)
SODIUM SERPL-SCNC: 134 MMOL/L — LOW (ref 135–145)
WBC # BLD: 5.45 K/UL — SIGNIFICANT CHANGE UP (ref 3.8–10.5)
WBC # BLD: 5.75 K/UL — SIGNIFICANT CHANGE UP (ref 3.8–10.5)
WBC # FLD AUTO: 5.45 K/UL — SIGNIFICANT CHANGE UP (ref 3.8–10.5)
WBC # FLD AUTO: 5.75 K/UL — SIGNIFICANT CHANGE UP (ref 3.8–10.5)

## 2018-06-29 RX ORDER — MIDODRINE HYDROCHLORIDE 2.5 MG/1
10 TABLET ORAL
Qty: 0 | Refills: 0 | Status: DISCONTINUED | OUTPATIENT
Start: 2018-06-29 | End: 2018-06-29

## 2018-06-29 RX ADMIN — Medication 0.25 MILLIGRAM(S): at 13:06

## 2018-06-29 RX ADMIN — Medication 0.25 MILLIGRAM(S): at 05:04

## 2018-06-29 RX ADMIN — Medication 1 GRAM(S): at 05:04

## 2018-06-29 RX ADMIN — MIDODRINE HYDROCHLORIDE 10 MILLIGRAM(S): 2.5 TABLET ORAL at 17:15

## 2018-06-29 RX ADMIN — Medication 100 MILLIGRAM(S): at 05:04

## 2018-06-29 RX ADMIN — Medication 1 GRAM(S): at 12:53

## 2018-06-29 RX ADMIN — Medication 100 MILLIGRAM(S): at 12:53

## 2018-06-29 RX ADMIN — MIDODRINE HYDROCHLORIDE 10 MILLIGRAM(S): 2.5 TABLET ORAL at 10:43

## 2018-06-29 RX ADMIN — MONTELUKAST 10 MILLIGRAM(S): 4 TABLET, CHEWABLE ORAL at 12:53

## 2018-06-29 RX ADMIN — Medication 1 GRAM(S): at 17:15

## 2018-06-29 RX ADMIN — Medication 25 MICROGRAM(S): at 05:04

## 2018-06-29 RX ADMIN — PANTOPRAZOLE SODIUM 40 MILLIGRAM(S): 20 TABLET, DELAYED RELEASE ORAL at 06:42

## 2018-06-29 RX ADMIN — Medication 20 MILLIGRAM(S): at 05:04

## 2018-06-29 RX ADMIN — Medication 100 MILLIGRAM(S): at 17:15

## 2018-06-29 NOTE — DISCHARGE NOTE ADULT - PATIENT PORTAL LINK FT
You can access the [facility] Patient Portal, offered by [facility], by registering with the following website: [email or web address]

## 2018-06-29 NOTE — DISCHARGE NOTE ADULT - PLAN OF CARE
Improvement of dysphagia stable for outpatient follow up Please follow your diet instructions. Please walk 4-5 x per day, Increase as tolerated. You may climb stairs. Continue to use incentive spirometer.   You may keep all wounds uncovered. Please shower daily. The suture will be removed in office at follow up apt.   See Dr. Weinstein's office in 7-10 days. Please call [phone] for an apt.   Please call the office at [phone] if you have fever's chills, worsening shortness of breath, chest pain, warmth, redness, weakness or any concerning symptom

## 2018-06-29 NOTE — DISCHARGE NOTE ADULT - INSTRUCTIONS
soft diet Please continue with a soft diet. Please remain upright after eating. Avoid carbonated beverages. Please remain upright while eating. Please sleep at a 30-45 degree angle. Please continue with a soft diet. Please remain upright after eating. Avoid carbonated beverages. Please remain upright while eating. Please sleep at a 30-45 degree angle. Please remain on a soft diet until you follow up with Dr. Weinstein. Continue soft diet, when eating make sure you are sitting up right position. Any difficulty swallowing please contact Dr. Weinstein

## 2018-06-29 NOTE — DISCHARGE NOTE ADULT - NS AS ACTIVITY OBS
Do not drive or operate machinery/Walking-Outdoors allowed/Showering allowed/Walking-Indoors allowed/Stairs allowed/No Heavy lifting/straining/Sex allowed

## 2018-06-29 NOTE — PROGRESS NOTE ADULT - SUBJECTIVE AND OBJECTIVE BOX
ANESTHESIA POSTOP CHECK    83y Male POSTOP DAY 1     No COMPLAINTS    NO APPARENT ANESTHESIA COMPLICATIONS

## 2018-06-29 NOTE — DISCHARGE NOTE ADULT - MEDICATION SUMMARY - MEDICATIONS TO TAKE
I will START or STAY ON the medications listed below when I get home from the hospital:    predniSONE  -- 5 mg by mouth once a day  -- Indication: For Steriods     tamsulosin 0.4 mg oral capsule  -- 1 cap(s) by mouth once a day  -- Indication: For BPH    phenytoin 100 mg oral capsule, extended release  -- 1 cap(s) by mouth every 6 hours  -- Indication: For Seizures    amitriptyline 10 mg oral tablet  -- orally 3 times a day  -- Indication: For Antidepressants    ezetimibe 10 mg oral tablet  -- 1 tab(s) by mouth once a day  -- Indication: For HLD    simvastatin  -- Indication: For HLD    clopidogrel  -- Indication: For CAD    ALPRAZolam 0.25 mg oral tablet  -- 1 tab(s) by mouth 3 times a day  -- Indication: For Anxiety    montelukast 10 mg oral tablet  -- 1 tab(s) by mouth once a day  -- Indication: For Asthma    midodrine  -- 10 mg by mouth 2 times a day  -- Indication: For Blood Pressure    Carafate  -- 1 gram  by mouth 4 times a day  -- Indication: For Acid reflux    levothyroxine  -- 25 mcg odd days  50mcg even days   -- Indication: For Synthroid    Centrum oral tablet  -- orally once a day  -- Indication: For Vitamin    folic acid  -- 400 microgram(s) orally  -- Indication: For Vitamin    Vitamin B6  -- orally once a day  -- Indication: For Vitamin    Vitamin B12  -- orally once a day  -- Indication: For Vitamin

## 2018-06-29 NOTE — DISCHARGE NOTE ADULT - CARE PLAN
Principal Discharge DX:	Esophagus cancer  Goal:	Improvement of dysphagia  Assessment and plan of treatment:	stable for outpatient follow up Principal Discharge DX:	Esophagus cancer  Goal:	Improvement of dysphagia  Assessment and plan of treatment:	Please follow your diet instructions. Please walk 4-5 x per day, Increase as tolerated. You may climb stairs. Continue to use incentive spirometer.   You may keep all wounds uncovered. Please shower daily. The suture will be removed in office at follow up apt.   See Dr. Weinstein's office in 7-10 days. Please call [phone] for an apt.   Please call the office at [phone] if you have fever's chills, worsening shortness of breath, chest pain, warmth, redness, weakness or any concerning symptom

## 2018-06-29 NOTE — DISCHARGE NOTE ADULT - HOSPITAL COURSE
82 y/o M, former smoker, w/ hx of  having had an [place]-Trent esophagectomy on 8/14/17 for esophageal cancer  developed dysphagia, & underwent an EGD dilation up to 48 Mosotho on 2/8/18.   For this admission, the pt c/o dysphagia for a few days, and underwent a repeat EGD, dilation.  He was discharged after tolerating a diet. 82 y/o M, former smoker, w/ hx of  having had an V[place]-Trent esophagectomy on 8/14/17 for esophageal cancer  developed dysphagia, & underwent an EGD dilation up to 48 Uzbek on 2/8/18.   For this admission, the pt c/o dysphagia for a few days, and underwent a repeat EGD, dilation. He was advanced from a clear diet to a soft diet on POD1. His H/H dropped from 9.9 to 8.5 post operatively. He was given 1u PRBCs, his H/H responded appropriately  He was discharged after tolerating a diet. 84 y/o M, former smoker, w/ hx of  having had an Vance-Trent esophagectomy on 8/14/17 for esophageal cancer  developed dysphagia, & underwent an EGD dilation up to 48 Norwegian on 2/8/18.   For this admission, the pt c/o dysphagia for a few days, and underwent a repeat EGD, dilation. He was advanced from a clear diet to a soft diet on POD1. His H/H dropped from 9.9 to 8.5 post operatively. He was given 1u PRBCs, his H/H responded appropriately to the blood. Patient shows no S/S of bleeding, is resting comfortably vitals and labs reviews and is stable for d/c by Dr. Weinstein. He was discharged after tolerating a diet. Pt is refusing home PT, he and wife would like to be discharged. Pt's wife is able to help pt immediately post operatively and patient has family nearby which can help patient.

## 2018-06-29 NOTE — DISCHARGE NOTE ADULT - CARE PROVIDER_API CALL
Christian Weinstein), Surgery; Thoracic Surgery  [address] [place] [address]  [place] [address]  Phone: [phone]  Fax: [phone]

## 2018-06-29 NOTE — CHART NOTE - NSCHARTNOTEFT_GEN_A_CORE
Spoke to pt and wife. Patient and wife do not want home PT. Wife is able to help patient around the house and patient had family nearby which can help that patient postoperatively.

## 2018-06-29 NOTE — PROGRESS NOTE ADULT - SUBJECTIVE AND OBJECTIVE BOX
MEJIA LATIF:[id number],   83yMale followed for:  penicillin (Rash)    PAST MEDICAL & SURGICAL HISTORY:  Coronary artery disease: s/p 3 stents on June 30, 2017 at [place] Dr. Lavelle Reid  OA (osteoarthritis) of knee: right  Former smoker, stopped smoking in distant past  Rheumatoid arthritis  Seizure disorder: 1 episode approximately 15 yrs ago  Asthma  Hyperlipidemia  BPH (benign prostatic hyperplasia)  HTN (hypertension)  Esophagus cancer  Chronic allergic rhinitis  BCC (basal cell carcinoma): skin  Cerebrovascular accident (CVA)  Anxiety  Anastomotic leak following esophagectomy: [place] logan esophagectomy  History of tonsillectomy  H/O heart artery stent: June 30, 2017  Knee arthropathy: left  History of total hip replacement: left  History of hernia repair    FAMILY HISTORY:  Family history of heart attack  Family history of pulmonary embolism: father @ 49 yr old  FH: CAD (coronary artery disease) (Sibling)    MEDICATIONS  (STANDING):  ALPRAZolam 0.25 milliGRAM(s) Oral every 8 hours  dextrose 5% + sodium chloride 0.45%. 1000 milliLiter(s) (75 mL/Hr) IV Continuous <Continuous>  hydrocortisone sodium succinate Injectable 20 milliGRAM(s) IV Push daily  levothyroxine 25 MICROGram(s) Oral <User Schedule>  levothyroxine 25 MICROGram(s) Oral daily  montelukast 10 milliGRAM(s) Oral daily  pantoprazole    Tablet 40 milliGRAM(s) Oral before breakfast  phenytoin   Capsule 100 milliGRAM(s) Oral every 6 hours  sucralfate 1 Gram(s) Oral four times a day    MEDICATIONS  (PRN):      Vital Signs Last 24 Hrs  T(C): 36.3 (29 Jun 2018 07:35), Max: 36.6 (29 Jun 2018 05:00)  T(F): 97.4 (29 Jun 2018 07:35), Max: 97.9 (29 Jun 2018 05:00)  HR: 70 (29 Jun 2018 07:35) (60 - 76)  BP: 108/54 (29 Jun 2018 07:35) (101/44 - 135/52)  BP(mean): 59 (28 Jun 2018 21:02) (59 - 59)  RR: 18 (29 Jun 2018 07:35) (15 - 20)  SpO2: 96% (29 Jun 2018 07:35) (96% - 99%)  nc/at  s1s2  cta  soft, nt, nd no guarding or rebound  no c/c/e    CBC Full  -  ( 29 Jun 2018 05:25 )  WBC Count : 5.75 K/uL  Hemoglobin : 8.4 g/dL  Hematocrit : 25.7 %  Platelet Count - Automated : 161 K/uL  Mean Cell Volume : 91.5 fL  Mean Cell Hemoglobin : 29.9 pg  Mean Cell Hemoglobin Concentration : 32.7 %  Auto Neutrophil # : x  Auto Lymphocyte # : x  Auto Monocyte # : x  Auto Eosinophil # : x  Auto Basophil # : x  Auto Neutrophil % : x  Auto Lymphocyte % : x  Auto Monocyte % : x  Auto Eosinophil % : x  Auto Basophil % : x    06-29    134<L>  |  102  |  9   ----------------------------<  87  3.8   |  22  |  0.52    Ca    8.7      29 Jun 2018 05:25    TPro  6.7  /  Alb  3.0<L>  /  TBili  < 0.2<L>  /  DBili  x   /  AST  28  /  ALT  35  /  AlkPhos  274<H>  06-27    PT/INR - ( 27 Jun 2018 14:32 )   PT: 11.5 SEC;   INR: 1.04          PTT - ( 27 Jun 2018 14:32 )  PTT:36.5 SEC

## 2018-06-29 NOTE — DISCHARGE NOTE ADULT - ADDITIONAL INSTRUCTIONS
See Dr Weinstein in 2 weeks.  Call for an appointment and bring a chest X-ray with you. See Dr Weinstein in 2 weeks.  Call for an appointment

## 2018-07-17 ENCOUNTER — APPOINTMENT (OUTPATIENT)
Dept: THORACIC SURGERY | Facility: CLINIC | Age: 83
End: 2018-07-17
Payer: MEDICARE

## 2018-07-17 VITALS
OXYGEN SATURATION: 99 % | WEIGHT: 116 LBS | RESPIRATION RATE: 16 BRPM | HEIGHT: 63 IN | HEART RATE: 78 BPM | BODY MASS INDEX: 20.55 KG/M2

## 2018-07-17 VITALS — DIASTOLIC BLOOD PRESSURE: 77 MMHG | SYSTOLIC BLOOD PRESSURE: 147 MMHG

## 2018-07-17 PROBLEM — I25.10 ATHEROSCLEROTIC HEART DISEASE OF NATIVE CORONARY ARTERY WITHOUT ANGINA PECTORIS: Chronic | Status: ACTIVE | Noted: 2017-08-11

## 2018-07-17 PROCEDURE — 99214 OFFICE O/P EST MOD 30 MIN: CPT

## 2018-07-17 RX ORDER — CLARITHROMYCIN 500 MG/1
500 TABLET, FILM COATED ORAL
Qty: 20 | Refills: 0 | Status: COMPLETED | COMMUNITY
Start: 2017-12-27 | End: 2018-07-17

## 2018-07-17 RX ORDER — CYANOCOBALAMIN (VITAMIN B-12) 1000 MCG
100 TABLET, EXTENDED RELEASE ORAL
Refills: 0 | Status: ACTIVE | COMMUNITY

## 2018-07-17 RX ORDER — PROMETHAZINE HYDROCHLORIDE AND PHENYLEPHRINE HYDROCHLORIDE 6.25; 5 MG/5ML; MG/5ML
6.25-5 SYRUP ORAL
Qty: 400 | Refills: 1 | Status: COMPLETED | COMMUNITY
Start: 2018-04-03 | End: 2018-07-17

## 2018-07-17 RX ORDER — OLMESARTAN MEDOXOMIL 40 MG/1
TABLET, FILM COATED ORAL
Refills: 0 | Status: COMPLETED | COMMUNITY
End: 2018-07-17

## 2018-07-17 RX ORDER — METOPROLOL TARTRATE 25 MG/1
25 TABLET, FILM COATED ORAL
Qty: 60 | Refills: 0 | Status: COMPLETED | COMMUNITY
Start: 2017-11-27 | End: 2018-07-17

## 2018-07-17 RX ORDER — METOPROLOL SUCCINATE 25 MG/1
25 TABLET, EXTENDED RELEASE ORAL
Qty: 15 | Refills: 0 | Status: COMPLETED | COMMUNITY
Start: 2018-04-18 | End: 2018-07-17

## 2018-07-17 RX ORDER — UMECLIDINIUM 62.5 UG/1
62.5 AEROSOL, POWDER ORAL
Qty: 1 | Refills: 5 | Status: COMPLETED | COMMUNITY
Start: 2017-10-25 | End: 2018-07-17

## 2018-07-17 RX ORDER — AMLODIPINE BESYLATE 2.5 MG/1
2.5 TABLET ORAL
Qty: 30 | Refills: 0 | Status: COMPLETED | COMMUNITY
Start: 2018-04-18 | End: 2018-07-17

## 2018-07-17 RX ORDER — AMITRIPTYLINE HYDROCHLORIDE 10 MG/1
10 TABLET, FILM COATED ORAL 3 TIMES DAILY
Qty: 90 | Refills: 1 | Status: COMPLETED | COMMUNITY
Start: 2017-11-21 | End: 2018-07-17

## 2018-07-17 RX ORDER — AZITHROMYCIN 250 MG/1
250 TABLET, FILM COATED ORAL
Qty: 3 | Refills: 0 | Status: COMPLETED | COMMUNITY
Start: 2018-06-03 | End: 2018-07-17

## 2018-07-17 RX ORDER — FINASTERIDE 5 MG/1
5 TABLET, FILM COATED ORAL
Refills: 0 | Status: ACTIVE | COMMUNITY

## 2018-07-17 RX ORDER — CYANOCOBALAMIN (VITAMIN B-12) 1000 MCG
TABLET ORAL
Refills: 0 | Status: ACTIVE | COMMUNITY

## 2018-07-17 RX ORDER — MIDODRINE HYDROCHLORIDE 5 MG/1
5 TABLET ORAL
Qty: 90 | Refills: 0 | Status: COMPLETED | COMMUNITY
Start: 2018-04-20 | End: 2018-07-17

## 2018-07-17 RX ORDER — MONTELUKAST SODIUM 10 MG/1
10 TABLET, FILM COATED ORAL
Refills: 0 | Status: ACTIVE | COMMUNITY

## 2018-07-17 RX ORDER — FAMCICLOVIR 500 MG/1
500 TABLET, FILM COATED ORAL
Qty: 21 | Refills: 0 | Status: COMPLETED | COMMUNITY
Start: 2018-03-27 | End: 2018-07-17

## 2018-07-17 RX ORDER — ACLIDINIUM BROMIDE 400 UG/1
400 POWDER, METERED RESPIRATORY (INHALATION) TWICE DAILY
Qty: 3 | Refills: 2 | Status: COMPLETED | COMMUNITY
Start: 2018-04-10 | End: 2018-07-17

## 2018-07-17 RX ORDER — ALPRAZOLAM 0.25 MG/1
0.25 TABLET ORAL
Refills: 0 | Status: ACTIVE | COMMUNITY

## 2018-07-17 RX ORDER — SULFAMETHOXAZOLE AND TRIMETHOPRIM 800; 160 MG/1; MG/1
800-160 TABLET ORAL
Qty: 5 | Refills: 0 | Status: COMPLETED | COMMUNITY
Start: 2018-06-07 | End: 2018-07-17

## 2018-07-17 RX ORDER — PREDNISONE 10 MG/1
10 TABLET ORAL
Qty: 1 | Refills: 0 | Status: COMPLETED | COMMUNITY
Start: 2018-01-05 | End: 2018-07-17

## 2018-07-17 RX ORDER — MOMETASONE FUROATE 220 UG/1
220 INHALANT RESPIRATORY (INHALATION)
Qty: 3 | Refills: 1 | Status: COMPLETED | COMMUNITY
Start: 2018-04-10 | End: 2018-07-17

## 2018-07-17 RX ORDER — PANTOPRAZOLE 40 MG/1
40 TABLET, DELAYED RELEASE ORAL
Qty: 60 | Refills: 0 | Status: COMPLETED | COMMUNITY
Start: 2017-08-01 | End: 2018-07-17

## 2018-07-17 RX ORDER — PREDNISONE 20 MG/1
20 TABLET ORAL
Qty: 10 | Refills: 0 | Status: COMPLETED | COMMUNITY
Start: 2018-04-20 | End: 2018-07-17

## 2018-07-17 RX ORDER — FLUTICASONE PROPIONATE 50 MCG
50 SPRAY, SUSPENSION NASAL
Refills: 0 | Status: ACTIVE | COMMUNITY

## 2018-07-17 RX ORDER — MIDODRINE HYDROCHLORIDE 10 MG/1
10 TABLET ORAL
Qty: 90 | Refills: 0 | Status: COMPLETED | COMMUNITY
Start: 2018-05-03 | End: 2018-07-17

## 2018-07-17 RX ORDER — NYSTATIN 100000 [USP'U]/ML
100000 SUSPENSION ORAL
Qty: 280 | Refills: 0 | Status: COMPLETED | COMMUNITY
Start: 2017-06-23 | End: 2018-07-17

## 2018-07-17 RX ORDER — PHENYTOIN SODIUM 200 MG/1
200 CAPSULE, EXTENDED RELEASE ORAL
Qty: 60 | Refills: 0 | Status: COMPLETED | COMMUNITY
Start: 2018-06-03 | End: 2018-07-17

## 2018-07-17 RX ORDER — PROMETHAZINE HYDROCHLORIDE 6.25 MG/5ML
6.25 SOLUTION ORAL
Qty: 2 | Refills: 0 | Status: COMPLETED | COMMUNITY
Start: 2017-10-25 | End: 2018-07-17

## 2018-07-17 RX ORDER — CHROMIUM 200 MCG
TABLET ORAL
Refills: 0 | Status: ACTIVE | COMMUNITY

## 2018-07-26 PROBLEM — Z86.73 HISTORY OF STROKE: Status: RESOLVED | Noted: 2017-04-04 | Resolved: 2018-07-26

## 2018-08-06 ENCOUNTER — APPOINTMENT (OUTPATIENT)
Dept: THORACIC SURGERY | Facility: CLINIC | Age: 83
End: 2018-08-06
Payer: MEDICARE

## 2018-08-06 VITALS
OXYGEN SATURATION: 97 % | SYSTOLIC BLOOD PRESSURE: 106 MMHG | BODY MASS INDEX: 20.19 KG/M2 | WEIGHT: 114 LBS | DIASTOLIC BLOOD PRESSURE: 63 MMHG | RESPIRATION RATE: 18 BRPM | HEART RATE: 76 BPM

## 2018-08-06 PROCEDURE — 99214 OFFICE O/P EST MOD 30 MIN: CPT

## 2018-08-29 ENCOUNTER — INPATIENT (INPATIENT)
Facility: HOSPITAL | Age: 83
LOS: 1 days | Discharge: HOME HEALTH | End: 2018-08-31
Attending: THORACIC SURGERY (CARDIOTHORACIC VASCULAR SURGERY) | Admitting: THORACIC SURGERY (CARDIOTHORACIC VASCULAR SURGERY)
Payer: MEDICARE

## 2018-08-29 ENCOUNTER — TRANSCRIPTION ENCOUNTER (OUTPATIENT)
Age: 83
End: 2018-08-29

## 2018-08-29 VITALS
HEART RATE: 80 BPM | DIASTOLIC BLOOD PRESSURE: 47 MMHG | SYSTOLIC BLOOD PRESSURE: 114 MMHG | OXYGEN SATURATION: 96 % | RESPIRATION RATE: 20 BRPM | TEMPERATURE: 99 F

## 2018-08-29 DIAGNOSIS — Z95.5 PRESENCE OF CORONARY ANGIOPLASTY IMPLANT AND GRAFT: Chronic | ICD-10-CM

## 2018-08-29 DIAGNOSIS — Z98.890 OTHER SPECIFIED POSTPROCEDURAL STATES: Chronic | ICD-10-CM

## 2018-08-29 DIAGNOSIS — Z90.89 ACQUIRED ABSENCE OF OTHER ORGANS: Chronic | ICD-10-CM

## 2018-08-29 DIAGNOSIS — K91.89 OTHER POSTPROCEDURAL COMPLICATIONS AND DISORDERS OF DIGESTIVE SYSTEM: Chronic | ICD-10-CM

## 2018-08-29 DIAGNOSIS — Z96.649 PRESENCE OF UNSPECIFIED ARTIFICIAL HIP JOINT: Chronic | ICD-10-CM

## 2018-08-29 DIAGNOSIS — R13.10 DYSPHAGIA, UNSPECIFIED: ICD-10-CM

## 2018-08-29 DIAGNOSIS — M12.9 ARTHROPATHY, UNSPECIFIED: Chronic | ICD-10-CM

## 2018-08-29 LAB
ALBUMIN SERPL ELPH-MCNC: 3.1 G/DL — LOW (ref 3.3–5)
ALP SERPL-CCNC: 270 U/L — HIGH (ref 40–120)
ALT FLD-CCNC: 46 U/L — HIGH (ref 4–41)
APPEARANCE UR: CLEAR — SIGNIFICANT CHANGE UP
APTT BLD: 34.2 SEC — SIGNIFICANT CHANGE UP (ref 27.5–37.4)
AST SERPL-CCNC: 47 U/L — HIGH (ref 4–40)
BACTERIA # UR AUTO: SIGNIFICANT CHANGE UP
BASOPHILS # BLD AUTO: 0.04 K/UL — SIGNIFICANT CHANGE UP (ref 0–0.2)
BASOPHILS NFR BLD AUTO: 0.8 % — SIGNIFICANT CHANGE UP (ref 0–2)
BILIRUB SERPL-MCNC: 0.2 MG/DL — SIGNIFICANT CHANGE UP (ref 0.2–1.2)
BILIRUB UR-MCNC: NEGATIVE — SIGNIFICANT CHANGE UP
BLD GP AB SCN SERPL QL: NEGATIVE — SIGNIFICANT CHANGE UP
BLOOD UR QL VISUAL: NEGATIVE — SIGNIFICANT CHANGE UP
BUN SERPL-MCNC: 13 MG/DL — SIGNIFICANT CHANGE UP (ref 7–23)
CALCIUM SERPL-MCNC: 10 MG/DL — SIGNIFICANT CHANGE UP (ref 8.4–10.5)
CHLORIDE SERPL-SCNC: 102 MMOL/L — SIGNIFICANT CHANGE UP (ref 98–107)
CO2 SERPL-SCNC: 29 MMOL/L — SIGNIFICANT CHANGE UP (ref 22–31)
COLOR SPEC: YELLOW — SIGNIFICANT CHANGE UP
CREAT SERPL-MCNC: 0.62 MG/DL — SIGNIFICANT CHANGE UP (ref 0.5–1.3)
EOSINOPHIL # BLD AUTO: 0.05 K/UL — SIGNIFICANT CHANGE UP (ref 0–0.5)
EOSINOPHIL NFR BLD AUTO: 1 % — SIGNIFICANT CHANGE UP (ref 0–6)
GLUCOSE SERPL-MCNC: 78 MG/DL — SIGNIFICANT CHANGE UP (ref 70–99)
GLUCOSE UR-MCNC: NEGATIVE — SIGNIFICANT CHANGE UP
HCT VFR BLD CALC: 35 % — LOW (ref 39–50)
HGB BLD-MCNC: 11.1 G/DL — LOW (ref 13–17)
HYALINE CASTS # UR AUTO: NEGATIVE — SIGNIFICANT CHANGE UP
IMM GRANULOCYTES # BLD AUTO: 0.01 # — SIGNIFICANT CHANGE UP
IMM GRANULOCYTES NFR BLD AUTO: 0.2 % — SIGNIFICANT CHANGE UP (ref 0–1.5)
INR BLD: 1.1 — SIGNIFICANT CHANGE UP (ref 0.88–1.17)
KETONES UR-MCNC: SIGNIFICANT CHANGE UP
LEUKOCYTE ESTERASE UR-ACNC: NEGATIVE — SIGNIFICANT CHANGE UP
LYMPHOCYTES # BLD AUTO: 0.65 K/UL — LOW (ref 1–3.3)
LYMPHOCYTES # BLD AUTO: 13.2 % — SIGNIFICANT CHANGE UP (ref 13–44)
MCHC RBC-ENTMCNC: 29.5 PG — SIGNIFICANT CHANGE UP (ref 27–34)
MCHC RBC-ENTMCNC: 31.7 % — LOW (ref 32–36)
MCV RBC AUTO: 93.1 FL — SIGNIFICANT CHANGE UP (ref 80–100)
MONOCYTES # BLD AUTO: 0.27 K/UL — SIGNIFICANT CHANGE UP (ref 0–0.9)
MONOCYTES NFR BLD AUTO: 5.5 % — SIGNIFICANT CHANGE UP (ref 2–14)
NEUTROPHILS # BLD AUTO: 3.92 K/UL — SIGNIFICANT CHANGE UP (ref 1.8–7.4)
NEUTROPHILS NFR BLD AUTO: 79.3 % — HIGH (ref 43–77)
NITRITE UR-MCNC: NEGATIVE — SIGNIFICANT CHANGE UP
NRBC # FLD: 0 — SIGNIFICANT CHANGE UP
PH UR: 6 — SIGNIFICANT CHANGE UP (ref 5–8)
PLATELET # BLD AUTO: 207 K/UL — SIGNIFICANT CHANGE UP (ref 150–400)
PMV BLD: 8.9 FL — SIGNIFICANT CHANGE UP (ref 7–13)
POTASSIUM SERPL-MCNC: 4.2 MMOL/L — SIGNIFICANT CHANGE UP (ref 3.5–5.3)
POTASSIUM SERPL-SCNC: 4.2 MMOL/L — SIGNIFICANT CHANGE UP (ref 3.5–5.3)
PROT SERPL-MCNC: 7.6 G/DL — SIGNIFICANT CHANGE UP (ref 6–8.3)
PROT UR-MCNC: SIGNIFICANT CHANGE UP
PROTHROM AB SERPL-ACNC: 12.7 SEC — SIGNIFICANT CHANGE UP (ref 9.8–13.1)
RBC # BLD: 3.76 M/UL — LOW (ref 4.2–5.8)
RBC # FLD: 15.6 % — HIGH (ref 10.3–14.5)
RBC CASTS # UR COMP ASSIST: HIGH (ref 0–?)
RH IG SCN BLD-IMP: POSITIVE — SIGNIFICANT CHANGE UP
SODIUM SERPL-SCNC: 142 MMOL/L — SIGNIFICANT CHANGE UP (ref 135–145)
SP GR SPEC: 1.02 — SIGNIFICANT CHANGE UP (ref 1–1.04)
SQUAMOUS # UR AUTO: SIGNIFICANT CHANGE UP
UROBILINOGEN FLD QL: SIGNIFICANT CHANGE UP
WBC # BLD: 4.94 K/UL — SIGNIFICANT CHANGE UP (ref 3.8–10.5)
WBC # FLD AUTO: 4.94 K/UL — SIGNIFICANT CHANGE UP (ref 3.8–10.5)
WBC UR QL: SIGNIFICANT CHANGE UP (ref 0–?)

## 2018-08-29 RX ORDER — PREGABALIN 225 MG/1
1000 CAPSULE ORAL DAILY
Qty: 0 | Refills: 0 | Status: DISCONTINUED | OUTPATIENT
Start: 2018-08-29 | End: 2018-08-31

## 2018-08-29 RX ORDER — LEVOTHYROXINE SODIUM 125 MCG
50 TABLET ORAL DAILY
Qty: 0 | Refills: 0 | Status: DISCONTINUED | OUTPATIENT
Start: 2018-08-29 | End: 2018-08-31

## 2018-08-29 RX ORDER — SODIUM CHLORIDE 9 MG/ML
500 INJECTION, SOLUTION INTRAVENOUS ONCE
Qty: 0 | Refills: 0 | Status: COMPLETED | OUTPATIENT
Start: 2018-08-29 | End: 2018-08-29

## 2018-08-29 RX ORDER — AMITRIPTYLINE HCL 25 MG
10 TABLET ORAL EVERY 8 HOURS
Qty: 0 | Refills: 0 | Status: DISCONTINUED | OUTPATIENT
Start: 2018-08-29 | End: 2018-08-31

## 2018-08-29 RX ORDER — MIDODRINE HYDROCHLORIDE 2.5 MG/1
10 TABLET ORAL
Qty: 0 | Refills: 0 | Status: DISCONTINUED | OUTPATIENT
Start: 2018-08-29 | End: 2018-08-31

## 2018-08-29 RX ORDER — SIMVASTATIN 20 MG/1
40 TABLET, FILM COATED ORAL AT BEDTIME
Qty: 0 | Refills: 0 | Status: DISCONTINUED | OUTPATIENT
Start: 2018-08-29 | End: 2018-08-31

## 2018-08-29 RX ORDER — SUCRALFATE 1 G
1 TABLET ORAL
Qty: 0 | Refills: 0 | Status: DISCONTINUED | OUTPATIENT
Start: 2018-08-29 | End: 2018-08-31

## 2018-08-29 RX ORDER — LEVOTHYROXINE SODIUM 125 MCG
25 TABLET ORAL DAILY
Qty: 0 | Refills: 0 | Status: DISCONTINUED | OUTPATIENT
Start: 2018-08-29 | End: 2018-08-29

## 2018-08-29 RX ORDER — TAMSULOSIN HYDROCHLORIDE 0.4 MG/1
0.4 CAPSULE ORAL AT BEDTIME
Qty: 0 | Refills: 0 | Status: DISCONTINUED | OUTPATIENT
Start: 2018-08-29 | End: 2018-08-31

## 2018-08-29 RX ORDER — MONTELUKAST 4 MG/1
10 TABLET, CHEWABLE ORAL DAILY
Qty: 0 | Refills: 0 | Status: DISCONTINUED | OUTPATIENT
Start: 2018-08-29 | End: 2018-08-31

## 2018-08-29 RX ORDER — SODIUM CHLORIDE 9 MG/ML
1000 INJECTION, SOLUTION INTRAVENOUS
Qty: 0 | Refills: 0 | Status: DISCONTINUED | OUTPATIENT
Start: 2018-08-29 | End: 2018-08-30

## 2018-08-29 RX ORDER — MULTIVIT-MIN/FERROUS GLUCONATE 9 MG/15 ML
1 LIQUID (ML) ORAL DAILY
Qty: 0 | Refills: 0 | Status: DISCONTINUED | OUTPATIENT
Start: 2018-08-29 | End: 2018-08-29

## 2018-08-29 RX ORDER — ALPRAZOLAM 0.25 MG
0.25 TABLET ORAL THREE TIMES A DAY
Qty: 0 | Refills: 0 | Status: DISCONTINUED | OUTPATIENT
Start: 2018-08-29 | End: 2018-08-31

## 2018-08-29 RX ORDER — LEVOTHYROXINE SODIUM 125 MCG
50 TABLET ORAL DAILY
Qty: 0 | Refills: 0 | Status: DISCONTINUED | OUTPATIENT
Start: 2018-08-29 | End: 2018-08-29

## 2018-08-29 RX ORDER — PYRIDOXINE HCL (VITAMIN B6) 100 MG
100 TABLET ORAL DAILY
Qty: 0 | Refills: 0 | Status: DISCONTINUED | OUTPATIENT
Start: 2018-08-29 | End: 2018-08-31

## 2018-08-29 RX ORDER — FOLIC ACID 0.8 MG
1 TABLET ORAL DAILY
Qty: 0 | Refills: 0 | Status: DISCONTINUED | OUTPATIENT
Start: 2018-08-29 | End: 2018-08-31

## 2018-08-29 RX ADMIN — Medication 10 MILLIGRAM(S): at 18:26

## 2018-08-29 RX ADMIN — MIDODRINE HYDROCHLORIDE 10 MILLIGRAM(S): 2.5 TABLET ORAL at 18:25

## 2018-08-29 RX ADMIN — PREGABALIN 1000 MICROGRAM(S): 225 CAPSULE ORAL at 18:26

## 2018-08-29 RX ADMIN — Medication 100 MILLIGRAM(S): at 18:24

## 2018-08-29 RX ADMIN — Medication 0.25 MILLIGRAM(S): at 17:33

## 2018-08-29 RX ADMIN — SODIUM CHLORIDE 500 MILLILITER(S): 9 INJECTION, SOLUTION INTRAVENOUS at 18:36

## 2018-08-29 NOTE — H&P ADULT - HISTORY OF PRESENT ILLNESS
84 y/o M, former smoker, w/ hx of HTN and TIA in 2000's w/ no residuals but developed Seizures (on medication), CAD s/p stents on 6/2017, and lower third Esophageal CA. Pt had completed neoadjuvant Chemo (5 cycles) and XRT (25 sessions) end of May, 2017, s/p Rt VATS, Robotic-assisted, MLND, wedge rxn of RLL, EGD, LAP, [place]-Trent esophagogastrectomy, feeding tube placement on 8/14/17. Post-op complicated by a leak confirmed w/ CT chest, GINA drain placed, eventually healed up, J.P drained removed on 10/24/17. J-tube fell out on the same day.   Pt developed dysphagia subsequent to surgery, s/p EGD dilation up to 48 Nigerian on 2/8/18 with repeat EGD and dilation with CRE balloon dilatation of the esophagogastric anastomosis up to 18 mm in diameter on 6/28/18.     Pt sent to the ED today from the office as he is scheduled for a repeat EGD with posible dilation tomorrow however pt with c/o increased dysphagia and c/f dehydration. 84 y/o M, former smoker, w/ hx of HTN and TIA in 2000's w/ no residuals but developed Seizures (on medication), CAD s/p stents on 6/2017, and lower third Esophageal CA. Pt had completed neoadjuvant Chemo (5 cycles) and XRT (25 sessions) end of May, 2017, s/p Rt VATS, Robotic-assisted, MLND, wedge rxn of RLL, EGD, LAP, [place]-Trent esophagogastrectomy, feeding tube placement on 8/14/17. Post-op complicated by a leak confirmed w/ CT chest, GINA drain placed, eventually healed up, J.P drained removed on 10/24/17. J-tube fell out on the same day.   Pt developed dysphagia subsequent to surgery, s/p EGD dilation up to 48 Icelandic on 2/8/18 with repeat EGD and dilation with CRE balloon dilatation of the esophagogastric anastomosis up to 18 mm in diameter on 6/28/18.     Pt reports to the ED today as he has been unable to tolerate solids or liquids for the past 1.5 days. Pt usually able to tolerate his meals but over the past 1.5 days he has not been able to tolerate any liquids ad when he woke up this morning, he felt the same and decided to call the office for further instructions given planned procedure tomorrow.  As pt scheduled for EGD tomorrow, there is concern for possible dehydration given intolerance.

## 2018-08-29 NOTE — ED PROVIDER NOTE - ATTENDING CONTRIBUTION TO CARE
I performed a face-to-face evaluation of the patient and performed a history and physical examination. I agree with the history and physical examination.    Older man, esophageal cancer, unable to tolerate PO. Sent from his CT surgeon for admission for hydration. Will check labs, hydrate, admit.

## 2018-08-29 NOTE — H&P ADULT - NSHPLABSRESULTS_GEN_ALL_CORE
08-29    142  |  102  |  13  ----------------------------<  78  4.2   |  29  |  0.62    Ca    10.0      29 Aug 2018 16:32    TPro  7.6  /  Alb  3.1<L>  /  TBili  0.2  /  DBili  x   /  AST  47<H>  /  ALT  46<H>  /  AlkPhos  270<H>  08-29

## 2018-08-29 NOTE — H&P ADULT - PMH
Anxiety    Asthma    BCC (basal cell carcinoma)  skin  BPH (benign prostatic hyperplasia)    Cerebrovascular accident (CVA)    Chronic allergic rhinitis    Coronary artery disease  s/p 3 stents on June 30, 2017 at [place] Dr. Lavelle Reid  Esophagus cancer    Former smoker, stopped smoking in distant past    HTN (hypertension)    Hyperlipidemia    OA (osteoarthritis) of knee  right  Rheumatoid arthritis    Seizure disorder  1 episode approximately 15 yrs ago

## 2018-08-29 NOTE — H&P ADULT - NSHPPHYSICALEXAM_GEN_ALL_CORE
Gen: NAD, Pleasant  Neuro: Patient is A&Ox4, follows commands, and speech fluent. EOMI intact, however slow saccadic movements, PERRLA, 3mm--2mm. Sensation in the Trigeminal distribution is wnl and symmetrical. Facial muscles intact and symmetrical. Hearing appropriate. Uvula rises equally upon phonation and tongue protrudes symmetrically. SCM/Trapezius 5/5 power. MS 5/5 bilat LE's; sensation intact bilat throughout  Cardiac: S1S2, r/r/r, (+)WALESKA HBA 2ICS LSB and 5ICS MCL, (-)r/g/h/l/t  Pulm: Clear to anterior chest wall; bibasilar rales, (-)egophony, (-)bronchophony; Well healed incisional scars  GI: Abd flat, non-tender, bsx4 quadrants  Ext: 2+ Radial LUE, 2+ Ulnar RUE; 2+ DP to LLE, 1+ DP to RLE; Bilat 2+ LE edema R>L; poor skin turgor to bilat UE's

## 2018-08-29 NOTE — ED ADULT NURSE NOTE - NSIMPLEMENTINTERV_GEN_ALL_ED
Implemented All Universal Safety Interventions:  [place] to call system. Call bell, personal items and telephone within reach. Instruct patient to call for assistance. Room bathroom lighting operational. Non-slip footwear when patient is off stretcher. Physically safe environment: no spills, clutter or unnecessary equipment. Stretcher in lowest position, wheels locked, appropriate side rails in place.

## 2018-08-29 NOTE — ED ADULT TRIAGE NOTE - CHIEF COMPLAINT QUOTE
pt sent by pmd Dr. Weinstein pt having difficulty swallowing pt scheduled for endoscopy tomorrow pt states "I have a history of esophagus cancer my doctor does not want me to get dehydrated"

## 2018-08-29 NOTE — ED PROVIDER NOTE - PMH
Anxiety    Asthma    BCC (basal cell carcinoma)  skin  BPH (benign prostatic hyperplasia)    Cerebrovascular accident (CVA)    Chronic allergic rhinitis    Coronary artery disease  s/p 3 stents on June 30, 2017 at [place] Dr. Lavelle Reid  Esophagus cancer    Former smoker, stopped smoking in distant past    HTN (hypertension)    Hyperlipidemia    OA (osteoarthritis) of knee  right  Rheumatoid arthritis    Seizure disorder  1 episode approximately 15 yrs ago

## 2018-08-29 NOTE — H&P ADULT - ASSESSMENT
This is an 84 y/o M, former smoker, w/ hx of HTN and TIA in 2000's w/ no residuals but developed Seizures (on medication), CAD s/p stents on 6/2017, and lower third Esophageal CA s/p  Rt VATS, Robotic-assisted, MLND, wedge rxn of RLL, EGD, LAP, [place] esophagogastrectomy, feeding tube placement on 8/14/17 with post-op course significant for esophageal narrowing requiring EGD and dilation x2, who now presents with inability to swallow and tolerate po diet for past 1.5 days now being admitted for dehydration, IVF resuscitation prior to scheduled EGD.    -Admit to CT Surgery, Dr. Weinstein, 8[facility]ower  -LR @ 125cc/hr  -Continue home meds, except plavix  -NPO excepts meds  -Vitals q4hrs  -Strict I/O  -Daily weights

## 2018-08-29 NOTE — ED PROVIDER NOTE - OBJECTIVE STATEMENT
83M pmhx esophageal CA s/p esophagectomy in 2017, CAD s/p 5 stents 11/207, HLD, CVA p/w difficulty swallowing x 2-3 days. Pt notes that even as recently as Sunday night he had no trouble swallowing liquids or solids but that as of Monday night something changed and he was not able to swallow the shrimp his daughter cooked him for dinner. Pt went and saw his Cardiothoracic surgeon (dr marquez, CTS here with whom he's scheduled to have an endoscopy tomorrow) who sent pt here with hopes he'd be admitted for IV hydration. Pt denies any / all other sx on ROS including f/c, cp, sob, abpain/n/v/d.

## 2018-08-29 NOTE — ED PROVIDER NOTE - MEDICAL DECISION MAKING DETAILS
83M hx esophageal CA s/p esophagectomy last year, CAD w/ stents, p/w dysphagia to solids/liquids, sent by Dr. Weinstein for admission/IV hydration. Will get admission labs and give IV hydration. Will reach out to CTS team.

## 2018-08-29 NOTE — H&P ADULT - NSHPSOCIALHISTORY_GEN_ALL_CORE
Former smoker, 2ppd x10 years, quit 50 years ago  Denies ETOH  Denies Illicits  Part-time ;  x59 years; lives with wife; great social support

## 2018-08-29 NOTE — ED ADULT NURSE NOTE - EENT ASSISTIVE DEVICES
difficult of swallowing intermittently since last year, getting worse last couple days , had appointment for Endoscopy tomorrow 08/29/2018 per patient's report

## 2018-08-30 ENCOUNTER — TRANSCRIPTION ENCOUNTER (OUTPATIENT)
Age: 83
End: 2018-08-30

## 2018-08-30 ENCOUNTER — APPOINTMENT (OUTPATIENT)
Dept: THORACIC SURGERY | Facility: HOSPITAL | Age: 83
End: 2018-08-30

## 2018-08-30 LAB
BASOPHILS # BLD AUTO: 0.05 K/UL — SIGNIFICANT CHANGE UP (ref 0–0.2)
BASOPHILS NFR BLD AUTO: 1.1 % — SIGNIFICANT CHANGE UP (ref 0–2)
BUN SERPL-MCNC: 13 MG/DL — SIGNIFICANT CHANGE UP (ref 7–23)
CALCIUM SERPL-MCNC: 9.5 MG/DL — SIGNIFICANT CHANGE UP (ref 8.4–10.5)
CHLORIDE SERPL-SCNC: 101 MMOL/L — SIGNIFICANT CHANGE UP (ref 98–107)
CO2 SERPL-SCNC: 24 MMOL/L — SIGNIFICANT CHANGE UP (ref 22–31)
CREAT SERPL-MCNC: 0.54 MG/DL — SIGNIFICANT CHANGE UP (ref 0.5–1.3)
EOSINOPHIL # BLD AUTO: 0.11 K/UL — SIGNIFICANT CHANGE UP (ref 0–0.5)
EOSINOPHIL NFR BLD AUTO: 2.4 % — SIGNIFICANT CHANGE UP (ref 0–6)
GLUCOSE SERPL-MCNC: 40 MG/DL — CRITICAL LOW (ref 70–99)
HCT VFR BLD CALC: 32.1 % — LOW (ref 39–50)
HGB BLD-MCNC: 10.2 G/DL — LOW (ref 13–17)
IMM GRANULOCYTES # BLD AUTO: 0.01 # — SIGNIFICANT CHANGE UP
IMM GRANULOCYTES NFR BLD AUTO: 0.2 % — SIGNIFICANT CHANGE UP (ref 0–1.5)
LYMPHOCYTES # BLD AUTO: 0.84 K/UL — LOW (ref 1–3.3)
LYMPHOCYTES # BLD AUTO: 18.2 % — SIGNIFICANT CHANGE UP (ref 13–44)
MCHC RBC-ENTMCNC: 30.4 PG — SIGNIFICANT CHANGE UP (ref 27–34)
MCHC RBC-ENTMCNC: 31.8 % — LOW (ref 32–36)
MCV RBC AUTO: 95.5 FL — SIGNIFICANT CHANGE UP (ref 80–100)
MONOCYTES # BLD AUTO: 0.39 K/UL — SIGNIFICANT CHANGE UP (ref 0–0.9)
MONOCYTES NFR BLD AUTO: 8.5 % — SIGNIFICANT CHANGE UP (ref 2–14)
NEUTROPHILS # BLD AUTO: 3.21 K/UL — SIGNIFICANT CHANGE UP (ref 1.8–7.4)
NEUTROPHILS NFR BLD AUTO: 69.6 % — SIGNIFICANT CHANGE UP (ref 43–77)
NRBC # FLD: 0 — SIGNIFICANT CHANGE UP
PLATELET # BLD AUTO: 201 K/UL — SIGNIFICANT CHANGE UP (ref 150–400)
PMV BLD: 9.2 FL — SIGNIFICANT CHANGE UP (ref 7–13)
POTASSIUM SERPL-MCNC: 3.8 MMOL/L — SIGNIFICANT CHANGE UP (ref 3.5–5.3)
POTASSIUM SERPL-SCNC: 3.8 MMOL/L — SIGNIFICANT CHANGE UP (ref 3.5–5.3)
RBC # BLD: 3.36 M/UL — LOW (ref 4.2–5.8)
RBC # FLD: 15.4 % — HIGH (ref 10.3–14.5)
SODIUM SERPL-SCNC: 140 MMOL/L — SIGNIFICANT CHANGE UP (ref 135–145)
WBC # BLD: 4.61 K/UL — SIGNIFICANT CHANGE UP (ref 3.8–10.5)
WBC # FLD AUTO: 4.61 K/UL — SIGNIFICANT CHANGE UP (ref 3.8–10.5)

## 2018-08-30 PROCEDURE — 43248 EGD GUIDE WIRE INSERTION: CPT

## 2018-08-30 RX ORDER — SODIUM CHLORIDE 0.65 %
1 AEROSOL, SPRAY (ML) NASAL
Qty: 0 | Refills: 0 | Status: DISCONTINUED | OUTPATIENT
Start: 2018-08-30 | End: 2018-08-31

## 2018-08-30 RX ORDER — CLOPIDOGREL BISULFATE 75 MG/1
75 TABLET, FILM COATED ORAL DAILY
Qty: 0 | Refills: 0 | Status: DISCONTINUED | OUTPATIENT
Start: 2018-08-31 | End: 2018-08-31

## 2018-08-30 RX ORDER — ALBUTEROL 90 UG/1
2 AEROSOL, METERED ORAL EVERY 6 HOURS
Qty: 0 | Refills: 0 | Status: DISCONTINUED | OUTPATIENT
Start: 2018-08-30 | End: 2018-08-31

## 2018-08-30 RX ADMIN — Medication 10 MILLIGRAM(S): at 21:48

## 2018-08-30 RX ADMIN — Medication 0.25 MILLIGRAM(S): at 21:48

## 2018-08-30 RX ADMIN — TAMSULOSIN HYDROCHLORIDE 0.4 MILLIGRAM(S): 0.4 CAPSULE ORAL at 21:48

## 2018-08-30 RX ADMIN — SIMVASTATIN 40 MILLIGRAM(S): 20 TABLET, FILM COATED ORAL at 21:48

## 2018-08-30 NOTE — BRIEF OPERATIVE NOTE - PROCEDURE
<<-----Click on this checkbox to enter Procedure EGD  08/30/2018  dilation with savory to 51 fr  Active  [email or web address]

## 2018-08-30 NOTE — PROGRESS NOTE ADULT - SUBJECTIVE AND OBJECTIVE BOX
Pt is without complaints.  He voided and tolerated his clears.    VSS    Gen: Awake and alert  Lungs: CTA with upper airway congestion  Heart: S1, S2  Abd: Soft    A: stable s/p EGD, dilation    P: Home tomorrow      clears tonight, soft diet in AM      Resume Plavix tomorrow      F/U AM labs and CXR

## 2018-08-30 NOTE — BRIEF OPERATIVE NOTE - PRE-OP DX
Esophageal cancer  08/30/2018  s/p esophagogastrectomy with dysphagia  Active  Wendy Valadez  Esophageal stricture  08/30/2018    Active  Wendy Valadez

## 2018-08-30 NOTE — BRIEF OPERATIVE NOTE - POST-OP DX
Esophageal cancer  08/30/2018  same  Active  Wendy Valadez  Esophageal stricture  08/30/2018  at 39cm  Active  Wendy Valadez

## 2018-08-31 VITALS
OXYGEN SATURATION: 96 % | TEMPERATURE: 98 F | DIASTOLIC BLOOD PRESSURE: 60 MMHG | SYSTOLIC BLOOD PRESSURE: 118 MMHG | RESPIRATION RATE: 17 BRPM | HEART RATE: 75 BPM

## 2018-08-31 LAB
BUN SERPL-MCNC: 13 MG/DL — SIGNIFICANT CHANGE UP (ref 7–23)
CALCIUM SERPL-MCNC: 9.5 MG/DL — SIGNIFICANT CHANGE UP (ref 8.4–10.5)
CHLORIDE SERPL-SCNC: 101 MMOL/L — SIGNIFICANT CHANGE UP (ref 98–107)
CO2 SERPL-SCNC: 26 MMOL/L — SIGNIFICANT CHANGE UP (ref 22–31)
CREAT SERPL-MCNC: 0.56 MG/DL — SIGNIFICANT CHANGE UP (ref 0.5–1.3)
GLUCOSE SERPL-MCNC: 82 MG/DL — SIGNIFICANT CHANGE UP (ref 70–99)
HCT VFR BLD CALC: 30.3 % — LOW (ref 39–50)
HGB BLD-MCNC: 9.8 G/DL — LOW (ref 13–17)
MCHC RBC-ENTMCNC: 29.9 PG — SIGNIFICANT CHANGE UP (ref 27–34)
MCHC RBC-ENTMCNC: 32.3 % — SIGNIFICANT CHANGE UP (ref 32–36)
MCV RBC AUTO: 92.4 FL — SIGNIFICANT CHANGE UP (ref 80–100)
NRBC # FLD: 0 — SIGNIFICANT CHANGE UP
PLATELET # BLD AUTO: 202 K/UL — SIGNIFICANT CHANGE UP (ref 150–400)
PMV BLD: 9.2 FL — SIGNIFICANT CHANGE UP (ref 7–13)
POTASSIUM SERPL-MCNC: 3.9 MMOL/L — SIGNIFICANT CHANGE UP (ref 3.5–5.3)
POTASSIUM SERPL-SCNC: 3.9 MMOL/L — SIGNIFICANT CHANGE UP (ref 3.5–5.3)
RBC # BLD: 3.28 M/UL — LOW (ref 4.2–5.8)
RBC # FLD: 15.3 % — HIGH (ref 10.3–14.5)
SODIUM SERPL-SCNC: 139 MMOL/L — SIGNIFICANT CHANGE UP (ref 135–145)
WBC # BLD: 9.25 K/UL — SIGNIFICANT CHANGE UP (ref 3.8–10.5)
WBC # FLD AUTO: 9.25 K/UL — SIGNIFICANT CHANGE UP (ref 3.8–10.5)

## 2018-08-31 PROCEDURE — 71045 X-RAY EXAM CHEST 1 VIEW: CPT | Mod: 26

## 2018-08-31 PROCEDURE — 99238 HOSP IP/OBS DSCHRG MGMT 30/<: CPT

## 2018-08-31 RX ORDER — CLOPIDOGREL BISULFATE 75 MG/1
1 TABLET, FILM COATED ORAL
Qty: 0 | Refills: 0 | COMMUNITY
Start: 2018-08-31

## 2018-08-31 RX ORDER — CLOPIDOGREL BISULFATE 75 MG/1
0 TABLET, FILM COATED ORAL
Qty: 0 | Refills: 0 | COMMUNITY

## 2018-08-31 RX ADMIN — Medication 50 MICROGRAM(S): at 05:39

## 2018-08-31 RX ADMIN — CLOPIDOGREL BISULFATE 75 MILLIGRAM(S): 75 TABLET, FILM COATED ORAL at 12:07

## 2018-08-31 RX ADMIN — Medication 1 GRAM(S): at 12:07

## 2018-08-31 RX ADMIN — Medication 1 GRAM(S): at 00:31

## 2018-08-31 RX ADMIN — Medication 5 MILLIGRAM(S): at 05:39

## 2018-08-31 RX ADMIN — Medication 1 MILLIGRAM(S): at 12:07

## 2018-08-31 RX ADMIN — PREGABALIN 1000 MICROGRAM(S): 225 CAPSULE ORAL at 12:10

## 2018-08-31 RX ADMIN — ALBUTEROL 2 PUFF(S): 90 AEROSOL, METERED ORAL at 00:31

## 2018-08-31 RX ADMIN — Medication 10 MILLIGRAM(S): at 14:26

## 2018-08-31 RX ADMIN — Medication 100 MILLIGRAM(S): at 12:08

## 2018-08-31 RX ADMIN — Medication 1 GRAM(S): at 05:39

## 2018-08-31 RX ADMIN — Medication 1 SPRAY(S): at 00:31

## 2018-08-31 RX ADMIN — Medication 0.25 MILLIGRAM(S): at 14:26

## 2018-08-31 RX ADMIN — MONTELUKAST 10 MILLIGRAM(S): 4 TABLET, CHEWABLE ORAL at 12:07

## 2018-08-31 RX ADMIN — Medication 0.25 MILLIGRAM(S): at 05:39

## 2018-08-31 RX ADMIN — Medication 10 MILLIGRAM(S): at 05:39

## 2018-08-31 NOTE — DISCHARGE NOTE ADULT - PATIENT PORTAL LINK FT
You can access the [facility] Patient Portal, offered by [facility], by registering with the following website: [email or web address]

## 2018-08-31 NOTE — PHYSICAL THERAPY INITIAL EVALUATION ADULT - PERTINENT HX OF CURRENT PROBLEM, REHAB EVAL
Pt. admitted for dysphagia. Now s/p EGD, dilation on 8/30/18. PMH of HTN and TIA in 2000's w/ no residuals but developed Seizures (on medication), CAD s/p stents on 6/2017, and lower third Esophageal CA.

## 2018-08-31 NOTE — PHYSICAL THERAPY INITIAL EVALUATION ADULT - CRITERIA FOR SKILLED THERAPEUTIC INTERVENTIONS
therapy frequency/predicted duration of therapy intervention/rehab potential/impairments found/anticipated discharge recommendation

## 2018-08-31 NOTE — PROGRESS NOTE ADULT - SUBJECTIVE AND OBJECTIVE BOX
MEJIA MCCORDBA:[id number],   83yMale followed for:  penicillin (Rash)    PAST MEDICAL & SURGICAL HISTORY:  Coronary artery disease: s/p 3 stents on June 30, 2017 at [place] Dr. Lavelle Reid  OA (osteoarthritis) of knee: right  Former smoker, stopped smoking in distant past  Rheumatoid arthritis  Seizure disorder: 1 episode approximately 15 yrs ago  Asthma  Hyperlipidemia  BPH (benign prostatic hyperplasia)  HTN (hypertension)  Esophagus cancer  Chronic allergic rhinitis  BCC (basal cell carcinoma): skin  Cerebrovascular accident (CVA)  Anxiety  Anastomotic leak following esophagectomy: [place] esophagectomy  History of tonsillectomy  H/O heart artery stent: June 30, 2017  Knee arthropathy: left  History of total hip replacement: left  History of hernia repair    FAMILY HISTORY:  Family history of heart attack  Family history of pulmonary embolism: father @ 49 yr old  FH: CAD (coronary artery disease) (Sibling)    MEDICATIONS  (STANDING):  ALPRAZolam 0.25 milliGRAM(s) Oral three times a day  amitriptyline 10 milliGRAM(s) Oral every 8 hours  clopidogrel Tablet 75 milliGRAM(s) Oral daily  cyanocobalamin 1000 MICROGram(s) Oral daily  folic acid 1 milliGRAM(s) Oral daily  levothyroxine 50 MICROGram(s) Oral daily  midodrine 10 milliGRAM(s) Oral <User Schedule>  montelukast 10 milliGRAM(s) Oral daily  multivitamin 1 Tablet(s) Oral daily  predniSONE   Tablet 5 milliGRAM(s) Oral daily  pyridoxine 100 milliGRAM(s) Oral daily  simvastatin 40 milliGRAM(s) Oral at bedtime  sucralfate 1 Gram(s) Oral four times a day  tamsulosin 0.4 milliGRAM(s) Oral at bedtime    MEDICATIONS  (PRN):  ALBUTerol    90 MICROgram(s) HFA Inhaler 2 Puff(s) Inhalation every 6 hours PRN Bronchospasm  sodium chloride 0.65% Nasal 1 Spray(s) Both Nostrils every 3 hours PRN Nasal Congestion      Vital Signs Last 24 Hrs  T(C): 36.6 (31 Aug 2018 05:36), Max: 36.7 (30 Aug 2018 11:04)  T(F): 97.9 (31 Aug 2018 05:36), Max: 98.1 (30 Aug 2018 11:04)  HR: 82 (31 Aug 2018 05:36) (72 - 83)  BP: 130/59 (31 Aug 2018 05:36) (130/59 - 147/74)  BP(mean): 97 (30 Aug 2018 11:04) (97 - 97)  RR: 17 (31 Aug 2018 05:36) (16 - 18)  SpO2: 93% (31 Aug 2018 05:36) (93% - 98%)  nc/at  s1s2  cta  soft, nt, nd no guarding or rebound  no c/c/e    CBC Full  -  ( 30 Aug 2018 06:00 )  WBC Count : 4.61 K/uL  Hemoglobin : 10.2 g/dL  Hematocrit : 32.1 %  Platelet Count - Automated : 201 K/uL  Mean Cell Volume : 95.5 fL  Mean Cell Hemoglobin : 30.4 pg  Mean Cell Hemoglobin Concentration : 31.8 %  Auto Neutrophil # : 3.21 K/uL  Auto Lymphocyte # : 0.84 K/uL  Auto Monocyte # : 0.39 K/uL  Auto Eosinophil # : 0.11 K/uL  Auto Basophil # : 0.05 K/uL  Auto Neutrophil % : 69.6 %  Auto Lymphocyte % : 18.2 %  Auto Monocyte % : 8.5 %  Auto Eosinophil % : 2.4 %  Auto Basophil % : 1.1 %    08-30    140  |  101  |  13  ----------------------------<  40<LL>  3.8   |  24  |  0.54    Ca    9.5      30 Aug 2018 06:00    TPro  7.6  /  Alb  3.1<L>  /  TBili  0.2  /  DBili  x   /  AST  47<H>  /  ALT  46<H>  /  AlkPhos  270<H>  08-29    PT/INR - ( 29 Aug 2018 16:32 )   PT: 12.7 SEC;   INR: 1.10          PTT - ( 29 Aug 2018 16:32 )  PTT:34.2 SEC

## 2018-08-31 NOTE — DISCHARGE NOTE ADULT - NS AS ACTIVITY OBS
Bathing allowed/Showering allowed/Walking-Indoors allowed/Stairs allowed/Walking-Outdoors allowed/Sex allowed

## 2018-08-31 NOTE — DISCHARGE NOTE ADULT - CARE PLAN
Principal Discharge DX:	Esophagus cancer  Goal:	Normal swallowing  Assessment and plan of treatment:	Stable for outpatient follow up Principal Discharge DX:	Esophagus cancer  Goal:	Normal swallowing  Assessment and plan of treatment:	Walk frequently. You may climb stairs.  Continue soft diet with Ensure supplements.   See Dr. Weinstein in 2-3 weeks for follow up. Call for an apt. [phone]

## 2018-08-31 NOTE — DISCHARGE NOTE ADULT - PLAN OF CARE
Normal swallowing Stable for outpatient follow up Walk frequently. You may climb stairs.  Continue soft diet with Ensure supplements.   See Dr. Weinstein in 2-3 weeks for follow up. Call for an apt. [phone]

## 2018-08-31 NOTE — PHYSICAL THERAPY INITIAL EVALUATION ADULT - GENERAL OBSERVATIONS, REHAB EVAL
Consult received, chart reviewed. Patient received seated in recliner chair, NAD, +pulse ox, wife present. Patient agreed to Evaluation from Physical Therapist.

## 2018-08-31 NOTE — DISCHARGE NOTE ADULT - CARE PROVIDER_API CALL
Christian Weinstein), Surgery; Thoracic Surgery  [address] [address] [place] [address]  [place] [address]  Phone: [phone]  Fax: [phone]

## 2018-08-31 NOTE — DISCHARGE NOTE ADULT - HOSPITAL COURSE
82 y/o M with h/o esophageal cancer and who is s/p Esophagectomy 8/14/17 and a couple of dilations in 2018 c/o inability to tolerate solids or liquids for several days.  He ws admitted for hydration in preparation for an EGD, dilation that was performed on 8/30/18.  He tolerated a soft diet post-op and was for discharge home. 82 y/o M with h/o esophageal cancer and who is s/p Esophagectomy 8/14/17 and a couple of dilations in 2018 c/o inability to tolerate solids or liquids for several days.  He ws admitted for hydration in preparation for an EGD, dilation that was performed on 8/30/18.  He tolerated a soft diet post-op and was for discharge home. Pt feels well today. Tolerating soft diet with ensure. Pt. amb with PT fartun, plan for home w home PT.

## 2018-08-31 NOTE — DISCHARGE NOTE ADULT - CONDITIONS AT DISCHARGE
Patient alert and oriented x4. Able to make needs known. MAI x4. VSS. Denies c/o pain or discomfort. Tolerating PO diet. Denies c/o nausea or vomitting. Ambulating OOB with 1 person assist. Patient support and education given.

## 2018-08-31 NOTE — DISCHARGE NOTE ADULT - MEDICATION SUMMARY - MEDICATIONS TO TAKE
I will START or STAY ON the medications listed below when I get home from the hospital:    predniSONE  -- 5 mg by mouth once a day  -- Indication: For steroid    tamsulosin 0.4 mg oral capsule  -- 1 cap(s) by mouth once a day  -- Indication: For bph    phenytoin 100 mg oral capsule, extended release  -- 1 cap(s) by mouth every 6 hours  -- Indication: For anti arrhythmic    amitriptyline 10 mg oral tablet  -- orally 3 times a day  -- Indication: For anti depressant    ezetimibe 10 mg oral tablet  -- 1 tab(s) by mouth once a day  -- Indication: For cholesterol    simvastatin  -- Indication: For cholesterol    clopidogrel 75 mg oral tablet  -- 1 tab(s) by mouth once a day  -- Indication: For anti platlet    ALPRAZolam 0.25 mg oral tablet  -- 1 tab(s) by mouth 3 times a day  -- Indication: For anxiety    montelukast 10 mg oral tablet  -- 1 tab(s) by mouth once a day  -- Indication: For allergy    midodrine  -- 10 mg by mouth 2 times a day  -- Indication: For blood pressure    Carafate  -- 1 gram  by mouth 4 times a day  -- Indication: For stomach coating    levothyroxine  -- 25 mcg odd days  50mcg even days   -- Indication: For thyroid    Centrum oral tablet  -- orally once a day  -- Indication: For vitamin    folic acid  -- 400 microgram(s) orally  -- Indication: For vitamin    Vitamin B6  -- orally once a day  -- Indication: For vitamin    Vitamin B12  -- orally once a day  -- Indication: For vitamin I will START or STAY ON the medications listed below when I get home from the hospital:    predniSONE  -- 5 mg by mouth once a day  -- Indication: For steroid    tamsulosin 0.4 mg oral capsule  -- 1 cap(s) by mouth once a day  -- Indication: For bph    phenytoin 100 mg oral capsule, extended release  -- 1 cap(s) by mouth every 6 hours  -- Indication: For antiarrythmic    amitriptyline 10 mg oral tablet  -- orally 3 times a day  -- Indication: For Depression    ezetimibe 10 mg oral tablet  -- 1 tab(s) by mouth once a day  -- Indication: For cholesterol    simvastatin  -- Indication: For cholesterol    clopidogrel 75 mg oral tablet  -- 1 tab(s) by mouth once a day  -- Indication: For anti platelet    ALPRAZolam 0.25 mg oral tablet  -- 1 tab(s) by mouth 3 times a day  -- Indication: For anxiety    montelukast 10 mg oral tablet  -- 1 tab(s) by mouth once a day  -- Indication: For allergy    midodrine  -- 10 mg by mouth 2 times a day  -- Indication: For blood pressure    Carafate  -- 1 gram  by mouth 4 times a day  -- Indication: For stomach protection    levothyroxine  -- 25 mcg odd days  50mcg even days   -- Indication: For thyroid    Centrum oral tablet  -- orally once a day  -- Indication: For vitamin    folic acid  -- 400 microgram(s) orally  -- Indication: For vitamin    Vitamin B6  -- orally once a day  -- Indication: For vitmain    Vitamin B12  -- orally once a day  -- Indication: For vitamin

## 2018-08-31 NOTE — PHYSICAL THERAPY INITIAL EVALUATION ADULT - ADDITIONAL COMMENTS
Pt. reports owning DME of rolling walker, rollator.     Pt. was left seated in recliner chair post PT Evaluation, NAD, all lines intact, call bell within reach, wife present.

## 2018-08-31 NOTE — DISCHARGE NOTE ADULT - HOME CARE AGENCY
[facility] [phone].  Nurse will visit day after d/c and will call to arrange visit time and PT.

## 2018-09-12 ENCOUNTER — MEDICATION RENEWAL (OUTPATIENT)
Age: 83
End: 2018-09-12

## 2018-09-12 RX ORDER — AMITRIPTYLINE HYDROCHLORIDE 10 MG/1
10 TABLET, FILM COATED ORAL 3 TIMES DAILY
Qty: 90 | Refills: 2 | Status: ACTIVE | COMMUNITY
Start: 2018-06-11 | End: 1900-01-01

## 2018-09-16 ENCOUNTER — INPATIENT (INPATIENT)
Facility: HOSPITAL | Age: 83
LOS: 1 days | Discharge: HOME | DRG: 189 | End: 2018-09-18
Attending: HOSPITALIST | Admitting: INTERNAL MEDICINE
Payer: MEDICARE

## 2018-09-16 VITALS
HEIGHT: 63 IN | OXYGEN SATURATION: 99 % | SYSTOLIC BLOOD PRESSURE: 116 MMHG | DIASTOLIC BLOOD PRESSURE: 61 MMHG | RESPIRATION RATE: 16 BRPM | HEART RATE: 73 BPM | TEMPERATURE: 98 F | WEIGHT: 123.02 LBS

## 2018-09-16 DIAGNOSIS — Z95.5 PRESENCE OF CORONARY ANGIOPLASTY IMPLANT AND GRAFT: Chronic | ICD-10-CM

## 2018-09-16 DIAGNOSIS — Z96.649 PRESENCE OF UNSPECIFIED ARTIFICIAL HIP JOINT: Chronic | ICD-10-CM

## 2018-09-16 DIAGNOSIS — Z98.890 OTHER SPECIFIED POSTPROCEDURAL STATES: Chronic | ICD-10-CM

## 2018-09-16 DIAGNOSIS — J44.1 CHRONIC OBSTRUCTIVE PULMONARY DISEASE WITH (ACUTE) EXACERBATION: ICD-10-CM

## 2018-09-16 DIAGNOSIS — K91.89 OTHER POSTPROCEDURAL COMPLICATIONS AND DISORDERS OF DIGESTIVE SYSTEM: Chronic | ICD-10-CM

## 2018-09-16 DIAGNOSIS — Z90.89 ACQUIRED ABSENCE OF OTHER ORGANS: Chronic | ICD-10-CM

## 2018-09-16 DIAGNOSIS — M12.9 ARTHROPATHY, UNSPECIFIED: Chronic | ICD-10-CM

## 2018-09-16 LAB
ALBUMIN SERPL ELPH-MCNC: 2.6 G/DL — LOW (ref 3.3–5)
ALP SERPL-CCNC: 240 U/L — HIGH (ref 40–120)
ALT FLD-CCNC: 57 U/L DA — HIGH (ref 10–45)
ANION GAP SERPL CALC-SCNC: 5 MMOL/L — SIGNIFICANT CHANGE UP (ref 5–17)
APTT BLD: 34.7 SEC — SIGNIFICANT CHANGE UP (ref 27.5–37.4)
AST SERPL-CCNC: 37 U/L — SIGNIFICANT CHANGE UP (ref 10–40)
BASOPHILS # BLD AUTO: 0 K/UL — SIGNIFICANT CHANGE UP (ref 0–0.2)
BASOPHILS NFR BLD AUTO: 0.8 % — SIGNIFICANT CHANGE UP (ref 0–2)
BILIRUB SERPL-MCNC: 0.2 MG/DL — SIGNIFICANT CHANGE UP (ref 0.2–1.2)
BUN SERPL-MCNC: 21 MG/DL — SIGNIFICANT CHANGE UP (ref 7–23)
CALCIUM SERPL-MCNC: 9.5 MG/DL — SIGNIFICANT CHANGE UP (ref 8.4–10.5)
CHLORIDE SERPL-SCNC: 106 MMOL/L — SIGNIFICANT CHANGE UP (ref 96–108)
CK MB BLD-MCNC: 12.2 % — HIGH (ref 0–3.5)
CK MB CFR SERPL CALC: 15.6 NG/ML — HIGH (ref 0.5–10)
CK SERPL-CCNC: 128 U/L — SIGNIFICANT CHANGE UP (ref 30–200)
CO2 BLDA-SCNC: 30 MMOL/L — SIGNIFICANT CHANGE UP (ref 22–30)
CO2 BLDA-SCNC: 32 MMOL/L — HIGH (ref 22–30)
CO2 SERPL-SCNC: 29 MMOL/L — SIGNIFICANT CHANGE UP (ref 22–31)
CREAT SERPL-MCNC: 0.7 MG/DL — SIGNIFICANT CHANGE UP (ref 0.5–1.3)
EOSINOPHIL # BLD AUTO: 0.1 K/UL — SIGNIFICANT CHANGE UP (ref 0–0.5)
EOSINOPHIL NFR BLD AUTO: 2.5 % — SIGNIFICANT CHANGE UP (ref 0–6)
GLUCOSE SERPL-MCNC: 62 MG/DL — LOW (ref 70–99)
HCT VFR BLD CALC: 32 % — LOW (ref 39–50)
HGB BLD-MCNC: 10.2 G/DL — LOW (ref 13–17)
INR BLD: 0.99 RATIO — SIGNIFICANT CHANGE UP (ref 0.88–1.16)
LYMPHOCYTES # BLD AUTO: 1.1 K/UL — SIGNIFICANT CHANGE UP (ref 1–3.3)
LYMPHOCYTES # BLD AUTO: 21.2 % — SIGNIFICANT CHANGE UP (ref 13–44)
MCHC RBC-ENTMCNC: 31.2 PG — SIGNIFICANT CHANGE UP (ref 27–34)
MCHC RBC-ENTMCNC: 32 GM/DL — SIGNIFICANT CHANGE UP (ref 32–36)
MCV RBC AUTO: 97.8 FL — SIGNIFICANT CHANGE UP (ref 80–100)
MONOCYTES # BLD AUTO: 0.4 K/UL — SIGNIFICANT CHANGE UP (ref 0–0.9)
MONOCYTES NFR BLD AUTO: 8.1 % — SIGNIFICANT CHANGE UP (ref 2–14)
NEUTROPHILS # BLD AUTO: 3.5 K/UL — SIGNIFICANT CHANGE UP (ref 1.8–7.4)
NEUTROPHILS NFR BLD AUTO: 67.4 % — SIGNIFICANT CHANGE UP (ref 43–77)
NT-PROBNP SERPL-SCNC: 4530 PG/ML — HIGH (ref 0–300)
PCO2 BLDA: 52 MMHG — HIGH (ref 32–46)
PCO2 BLDA: 65 MMHG — HIGH (ref 32–46)
PH BLDA: 7.29 — LOW (ref 7.35–7.45)
PH BLDA: 7.36 — SIGNIFICANT CHANGE UP (ref 7.35–7.45)
PLATELET # BLD AUTO: 164 K/UL — SIGNIFICANT CHANGE UP (ref 150–400)
PO2 BLDA: 46 MMHG — CRITICAL LOW (ref 74–108)
PO2 BLDA: 52 MMHG — LOW (ref 74–108)
POTASSIUM SERPL-MCNC: 4.2 MMOL/L — SIGNIFICANT CHANGE UP (ref 3.5–5.3)
POTASSIUM SERPL-SCNC: 4.2 MMOL/L — SIGNIFICANT CHANGE UP (ref 3.5–5.3)
PROT SERPL-MCNC: 7.4 G/DL — SIGNIFICANT CHANGE UP (ref 6–8.3)
PROTHROM AB SERPL-ACNC: 11 SEC — SIGNIFICANT CHANGE UP (ref 9.8–12.7)
RBC # BLD: 3.27 M/UL — LOW (ref 4.2–5.8)
RBC # FLD: 16.1 % — HIGH (ref 10.3–14.5)
SAO2 % BLDA: 74 % — LOW (ref 92–96)
SAO2 % BLDA: 84 % — LOW (ref 92–96)
SODIUM SERPL-SCNC: 140 MMOL/L — SIGNIFICANT CHANGE UP (ref 135–145)
TROPONIN I SERPL-MCNC: 0.02 NG/ML — SIGNIFICANT CHANGE UP (ref 0.02–0.06)
WBC # BLD: 5.3 K/UL — SIGNIFICANT CHANGE UP (ref 3.8–10.5)
WBC # FLD AUTO: 5.3 K/UL — SIGNIFICANT CHANGE UP (ref 3.8–10.5)

## 2018-09-16 PROCEDURE — 93010 ELECTROCARDIOGRAM REPORT: CPT

## 2018-09-16 PROCEDURE — 99285 EMERGENCY DEPT VISIT HI MDM: CPT | Mod: 25

## 2018-09-16 PROCEDURE — 71045 X-RAY EXAM CHEST 1 VIEW: CPT | Mod: 26

## 2018-09-16 PROCEDURE — 99223 1ST HOSP IP/OBS HIGH 75: CPT

## 2018-09-16 RX ORDER — ENOXAPARIN SODIUM 100 MG/ML
40 INJECTION SUBCUTANEOUS EVERY 24 HOURS
Qty: 0 | Refills: 0 | Status: DISCONTINUED | OUTPATIENT
Start: 2018-09-16 | End: 2018-09-18

## 2018-09-16 RX ORDER — IPRATROPIUM/ALBUTEROL SULFATE 18-103MCG
3 AEROSOL WITH ADAPTER (GRAM) INHALATION EVERY 6 HOURS
Qty: 0 | Refills: 0 | Status: DISCONTINUED | OUTPATIENT
Start: 2018-09-16 | End: 2018-09-18

## 2018-09-16 RX ORDER — ACETAZOLAMIDE 250 MG/1
125 TABLET ORAL ONCE
Qty: 0 | Refills: 0 | Status: COMPLETED | OUTPATIENT
Start: 2018-09-16 | End: 2018-09-16

## 2018-09-16 RX ORDER — MONTELUKAST 4 MG/1
10 TABLET, CHEWABLE ORAL AT BEDTIME
Qty: 0 | Refills: 0 | Status: DISCONTINUED | OUTPATIENT
Start: 2018-09-16 | End: 2018-09-18

## 2018-09-16 RX ORDER — ASPIRIN/CALCIUM CARB/MAGNESIUM 324 MG
324 TABLET ORAL DAILY
Qty: 0 | Refills: 0 | Status: DISCONTINUED | OUTPATIENT
Start: 2018-09-16 | End: 2018-09-18

## 2018-09-16 RX ORDER — CLOPIDOGREL BISULFATE 75 MG/1
75 TABLET, FILM COATED ORAL DAILY
Qty: 0 | Refills: 0 | Status: DISCONTINUED | OUTPATIENT
Start: 2018-09-16 | End: 2018-09-18

## 2018-09-16 RX ORDER — LEVOTHYROXINE SODIUM 125 MCG
25 TABLET ORAL DAILY
Qty: 0 | Refills: 0 | Status: DISCONTINUED | OUTPATIENT
Start: 2018-09-16 | End: 2018-09-18

## 2018-09-16 RX ORDER — DEXAMETHASONE 0.5 MG/5ML
10 ELIXIR ORAL ONCE
Qty: 0 | Refills: 0 | Status: COMPLETED | OUTPATIENT
Start: 2018-09-16 | End: 2018-09-16

## 2018-09-16 RX ORDER — ALPRAZOLAM 0.25 MG
0.25 TABLET ORAL THREE TIMES A DAY
Qty: 0 | Refills: 0 | Status: DISCONTINUED | OUTPATIENT
Start: 2018-09-16 | End: 2018-09-18

## 2018-09-16 RX ORDER — ATORVASTATIN CALCIUM 80 MG/1
20 TABLET, FILM COATED ORAL AT BEDTIME
Qty: 0 | Refills: 0 | Status: DISCONTINUED | OUTPATIENT
Start: 2018-09-16 | End: 2018-09-18

## 2018-09-16 RX ORDER — IPRATROPIUM/ALBUTEROL SULFATE 18-103MCG
3 AEROSOL WITH ADAPTER (GRAM) INHALATION
Qty: 0 | Refills: 0 | Status: COMPLETED | OUTPATIENT
Start: 2018-09-16 | End: 2018-09-16

## 2018-09-16 RX ORDER — SIMVASTATIN 20 MG/1
40 TABLET, FILM COATED ORAL AT BEDTIME
Qty: 0 | Refills: 0 | Status: DISCONTINUED | OUTPATIENT
Start: 2018-09-16 | End: 2018-09-16

## 2018-09-16 RX ORDER — SUCRALFATE 1 G
1 TABLET ORAL
Qty: 0 | Refills: 0 | Status: DISCONTINUED | OUTPATIENT
Start: 2018-09-16 | End: 2018-09-18

## 2018-09-16 RX ORDER — TAMSULOSIN HYDROCHLORIDE 0.4 MG/1
0.4 CAPSULE ORAL AT BEDTIME
Qty: 0 | Refills: 0 | Status: DISCONTINUED | OUTPATIENT
Start: 2018-09-16 | End: 2018-09-18

## 2018-09-16 RX ORDER — MIDODRINE HYDROCHLORIDE 2.5 MG/1
5 TABLET ORAL THREE TIMES A DAY
Qty: 0 | Refills: 0 | Status: DISCONTINUED | OUTPATIENT
Start: 2018-09-16 | End: 2018-09-18

## 2018-09-16 RX ORDER — SODIUM CHLORIDE 9 MG/ML
3 INJECTION INTRAMUSCULAR; INTRAVENOUS; SUBCUTANEOUS ONCE
Qty: 0 | Refills: 0 | Status: COMPLETED | OUTPATIENT
Start: 2018-09-16 | End: 2018-09-16

## 2018-09-16 RX ORDER — AMITRIPTYLINE HCL 25 MG
10 TABLET ORAL THREE TIMES A DAY
Qty: 0 | Refills: 0 | Status: DISCONTINUED | OUTPATIENT
Start: 2018-09-16 | End: 2018-09-18

## 2018-09-16 RX ORDER — TIOTROPIUM BROMIDE 18 UG/1
1 CAPSULE ORAL; RESPIRATORY (INHALATION) DAILY
Qty: 0 | Refills: 0 | Status: DISCONTINUED | OUTPATIENT
Start: 2018-09-16 | End: 2018-09-18

## 2018-09-16 RX ADMIN — Medication 100 MILLIGRAM(S): at 18:59

## 2018-09-16 RX ADMIN — Medication 102 MILLIGRAM(S): at 18:30

## 2018-09-16 RX ADMIN — Medication 10 MILLIGRAM(S): at 19:00

## 2018-09-16 RX ADMIN — Medication 3 MILLILITER(S): at 20:00

## 2018-09-16 RX ADMIN — Medication 3 MILLILITER(S): at 18:30

## 2018-09-16 RX ADMIN — Medication 324 MILLIGRAM(S): at 20:18

## 2018-09-16 RX ADMIN — SODIUM CHLORIDE 3 MILLILITER(S): 9 INJECTION INTRAMUSCULAR; INTRAVENOUS; SUBCUTANEOUS at 18:00

## 2018-09-16 RX ADMIN — Medication 3 MILLILITER(S): at 19:00

## 2018-09-16 NOTE — H&P ADULT - NEUROLOGICAL DETAILS
alert and oriented x 3/sensation intact/deep reflexes intact/responds to pain/responds to verbal commands/cranial nerves intact

## 2018-09-16 NOTE — H&P ADULT - ASSESSMENT
Acute hypoxic, hypercarbic resp insuff, COPD exacerbation. Acute hypoxic, hypercarbic resp insuff, COPD exacerbation.                IMPROVE VTE Individual Risk Assessment  RISK                                                                Points  [  ] Previous VTE                                                  3  [  ] Thrombophilia                                               2  [  ] Lower limb paralysis                                      2        (unable to hold up >15 seconds)    [x  ] Current Cancer                                            2         (within 6 months)  [  ] Immobilization > 24 hrs                                  1  [  ] ICU/CCU stay > 24 hours                                1  [x  ] Age > 60                                                      1  IMPROVE VTE Score __3___ Acute hypoxic, hypercarbic resp insuff, COPD exacerbation, acute on chronic bronchitis   Chronic diastolic CHF, Aortic stenosis. CAD, hx of stents  Chronic cough, hx of lung Ca, s/p wedge  resection, esoph Ca, s/p esophagogastrectomy.   Dyphagia, Esophageal strictures s/p repeated dilatations  BPH  hx of CVA, seizure  Hypothyroid      Admit  O2 supp 2 LPM via NC  Trend trops  will give a dose of Diamox  Bronchodilators via neb (Duoneb) every 6 hours  Cont Spiriva, Montelukast  Doxycline 100 mg BID x 7 days  Will give additional Solumedrol later tonight  Cont Prednisone, tapering course   Cont other meds as listed - ASA, Plavix, Phenytoin, Levothyroxine, Tamsulosin, Carafate  will get echo assess LV fn, ffup AS  will get doppler of right leg +swelling r/o dvt  DVT prophylaxis with Lovenox  PT Eval-O2 sats room air, ambulation, overnight oximetry  will prob need home O2      IMPROVE VTE Individual Risk Assessment  RISK                                                                Points  [  ] Previous VTE                                                  3  [  ] Thrombophilia                                               2  [  ] Lower limb paralysis                                      2        (unable to hold up >15 seconds)    [x  ] Current Cancer                                            2         (within 6 months)  [  ] Immobilization > 24 hrs                                  1  [  ] ICU/CCU stay > 24 hours                                1  [x  ] Age > 60                                                      1  IMPROVE VTE Score __3___ Acute hypoxic, hypercarbic resp insuff, COPD exacerbation, acute on chronic bronchitis   Chronic diastolic CHF, Aortic stenosis. CAD, hx of stents  Chronic cough, hx of lung Ca, s/p wedge  resection, esoph Ca, s/p esophagogastrectomy.   Dyphagia, Esophageal strictures s/p repeated dilatations  BPH  hx of CVA, seizure  Hypothyroid      Admit  O2 supp 2 LPM via NC  Trend trops  will give a dose of Diamox  Bronchodilators via neb (Duoneb) every 6 hours  Cont Spiriva, Montelukast  Doxycline 100 mg BID x 7 days  Will give additional Solumedrol later tonight  Cont Prednisone, tapering course   Cont other meds as listed - ASA, Plavix, Phenytoin, Levothyroxine, Tamsulosin, Carafate  will get echo assess LV fn, ffup AS  will get doppler of right leg +swelling r/o dvt  DVT prophylaxis with Lovenox  PT Eval-O2 sats room air, ambulation, overnight oximetry  Pulm Eval  will prob need home O2      IMPROVE VTE Individual Risk Assessment  RISK                                                                Points  [  ] Previous VTE                                                  3  [  ] Thrombophilia                                               2  [  ] Lower limb paralysis                                      2        (unable to hold up >15 seconds)    [x  ] Current Cancer                                            2         (within 6 months)  [  ] Immobilization > 24 hrs                                  1  [  ] ICU/CCU stay > 24 hours                                1  [x  ] Age > 60                                                      1  IMPROVE VTE Score __3___

## 2018-09-16 NOTE — H&P ADULT - NSCORESITESY/N_GEN_A_CORE_RD
Problem: Patient Care Overview  Goal: Plan of Care Review  Outcome: Ongoing (interventions implemented as appropriate)  Patient and family verbalize understanding of plan of care. Family at bedside. Patient up ad brisa and with stand-by assistance with no falls or injuries during shift. Patient c/o low appetite and eats 25% to 50% of meals. Patient denies N/V.        No

## 2018-09-16 NOTE — ED ADULT NURSE NOTE - PMH
Anxiety    Asthma    BCC (basal cell carcinoma)  skin  BPH (benign prostatic hyperplasia)    Cerebrovascular accident (CVA)    Chronic allergic rhinitis    Coronary artery disease  s/p 3 stents on June 30, 2017 at [place] Dr. Lavelle Reid  Esophagus cancer    Former smoker, stopped smoking in distant past    HTN (hypertension)    Hyperlipidemia    OA (osteoarthritis) of knee  right  Rheumatoid arthritis    Seizure disorder  1 episode approximately 15 yrs ago

## 2018-09-16 NOTE — H&P ADULT - NSHPLABSRESULTS_GEN_ALL_CORE
CT Chest, Non contrast:  Jun 27 2018  COMPARISON: CT chest dated 4/26/2018   FINDINGS:   CHEST:   LUNGS AND LARGE AIRWAYS: Mucous is noted within the bronchus intermedius and   right lower lobe bronchi. Right lower lobe linear atelectasis. Small nodular   opacities in the right upper lobe.   PLEURA: Small bilateral pleural effusions.   VESSELS: Atherosclerotic calcifications of the thoracic aorta and coronary   arteries.   HEART: Mild cardiomegaly. No pericardial effusion. Aortic valvular and   mitral annular calcifications.   MEDIASTINUM AND LAURA: No lymphadenopathy. Patient is status post   esophagectomy and gastric pull-up. The proximal gastric pull-through is   distended with debris and high density material.   CHEST WALL AND LOWER NECK: Heterogeneous thyroid gland.   VISUALIZED UPPER ABDOMEN: Bilateral renal cysts. A large exophytic   left-sided cyst extends into the left upper quadrant.   BONES: Multilevel spinal degenerative changes. Chronic L1 compression   deformity.   IMPRESSION:   1. Distended upper gastric pull-through filled with debris and high density   fluid.   2. Mucus plugging of the bronchus intermedius and right lower lobe bronchi   with right lower lobe linear atelectasis.   3. Small nodular opacities in the right upper lobe, possibly infectious. CT Chest, Non contrast:  Jun 27 2018  COMPARISON: CT chest dated 4/26/2018   FINDINGS:   CHEST:   LUNGS AND LARGE AIRWAYS: Mucous is noted within the bronchus intermedius and   right lower lobe bronchi. Right lower lobe linear atelectasis. Small nodular   opacities in the right upper lobe.   PLEURA: Small bilateral pleural effusions.   VESSELS: Atherosclerotic calcifications of the thoracic aorta and coronary   arteries.   HEART: Mild cardiomegaly. No pericardial effusion. Aortic valvular and   mitral annular calcifications.   MEDIASTINUM AND LAURA: No lymphadenopathy. Patient is status post   esophagectomy and gastric pull-up. The proximal gastric pull-through is   distended with debris and high density material.   CHEST WALL AND LOWER NECK: Heterogeneous thyroid gland.   VISUALIZED UPPER ABDOMEN: Bilateral renal cysts. A large exophytic   left-sided cyst extends into the left upper quadrant.   BONES: Multilevel spinal degenerative changes. Chronic L1 compression   deformity.   IMPRESSION:   1. Distended upper gastric pull-through filled with debris and high density   fluid.   2. Mucus plugging of the bronchus intermedius and right lower lobe bronchi   with right lower lobe linear atelectasis.   3. Small nodular opacities in the right upper lobe, possibly infectious.    Echo 11/2017   Summary:    1. Left ventricular ejection fraction, by visual estimation, is 65%.    2. Normal global left ventricular systolic function.    3. Spectral Doppler shows impaired relaxation pattern of left ventricular   myocardial filling (Grade I diastolic dysfunction).    4. Thickening and calcification of the posterior mitral valve leaflet.    5. Mild aortic regurgitation.    6. Moderate aortic valve stenosis.    7. LA volume Index is 50.0 ml/mï¿½ ml/m2.    8. Mildly enlarged left atrium.    9. Peak transaortic gradient equals 29.8 mmHg, mean transaortic gradient   equals 14.1 mmHg, the calculated aortic valve area equals 1.35 cmï¿½ by the   continuity equation consistent with moderate aortic stenosis. EKG NSR, RBBB, LAFB, 72/min    CXRay 09/16/18 cardiomegaly, bilat increased markings    CT Chest, Non contrast:  Jun 27 2018  COMPARISON: CT chest dated 4/26/2018   FINDINGS:   CHEST:   LUNGS AND LARGE AIRWAYS: Mucous is noted within the bronchus intermedius and   right lower lobe bronchi. Right lower lobe linear atelectasis. Small nodular   opacities in the right upper lobe.   PLEURA: Small bilateral pleural effusions.   VESSELS: Atherosclerotic calcifications of the thoracic aorta and coronary   arteries.   HEART: Mild cardiomegaly. No pericardial effusion. Aortic valvular and   mitral annular calcifications.   MEDIASTINUM AND LAURA: No lymphadenopathy. Patient is status post   esophagectomy and gastric pull-up. The proximal gastric pull-through is   distended with debris and high density material.   CHEST WALL AND LOWER NECK: Heterogeneous thyroid gland.   VISUALIZED UPPER ABDOMEN: Bilateral renal cysts. A large exophytic   left-sided cyst extends into the left upper quadrant.   BONES: Multilevel spinal degenerative changes. Chronic L1 compression   deformity.   IMPRESSION:   1. Distended upper gastric pull-through filled with debris and high density   fluid.   2. Mucus plugging of the bronchus intermedius and right lower lobe bronchi   with right lower lobe linear atelectasis.   3. Small nodular opacities in the right upper lobe, possibly infectious.    Echo 11/2017   Summary:    1. Left ventricular ejection fraction, by visual estimation, is 65%.    2. Normal global left ventricular systolic function.    3. Spectral Doppler shows impaired relaxation pattern of left ventricular   myocardial filling (Grade I diastolic dysfunction).    4. Thickening and calcification of the posterior mitral valve leaflet.    5. Mild aortic regurgitation.    6. Moderate aortic valve stenosis.    7. LA volume Index is 50.0 ml/mï¿½ ml/m2.    8. Mildly enlarged left atrium.    9. Peak transaortic gradient equals 29.8 mmHg, mean transaortic gradient   equals 14.1 mmHg, the calculated aortic valve area equals 1.35 cmï¿½ by the   continuity equation consistent with moderate aortic stenosis.

## 2018-09-16 NOTE — ED ADULT NURSE REASSESSMENT NOTE - NS ED NURSE REASSESS COMMENT FT1
Received pt in Bed 11 from [facility]. Pt is A&Ox3 and is resting comfortably. C/O on and right leg pain. Maintained pt on 2 L oxygen via nasal canula. No resp. distress noted. Pending medication from pharmacy and bed assignment. Informed pt on plan of care

## 2018-09-16 NOTE — ED ADULT NURSE NOTE - NSIMPLEMENTINTERV_GEN_ALL_ED
Implemented All Fall Risk Interventions:  [place] to call system. Call bell, personal items and telephone within reach. Instruct patient to call for assistance. Room bathroom lighting operational. Non-slip footwear when patient is off stretcher. Physically safe environment: no spills, clutter or unnecessary equipment. Stretcher in lowest position, wheels locked, appropriate side rails in place. Provide visual cue, wrist band, yellow gown, etc. Monitor gait and stability. Monitor for mental status changes and reorient to person, place, and time. Review medications for side effects contributing to fall risk. Reinforce activity limits and safety measures with patient and family.

## 2018-09-16 NOTE — H&P ADULT - PMH
Anxiety    Asthma    BCC (basal cell carcinoma)  skin  BPH (benign prostatic hyperplasia)    Cerebrovascular accident (CVA)    Chronic allergic rhinitis    Coronary artery disease  s/p 3 stents on June 30, 2017 at [place] Dr. Lavelle Reid  Esophagus cancer    Former smoker, stopped smoking in distant past    HTN (hypertension)    Hyperlipidemia    OA (osteoarthritis) of knee  right  Rheumatoid arthritis    Seizure disorder  1 episode approximately 15 yrs ago

## 2018-09-16 NOTE — H&P ADULT - RS GEN PE MLT RESP DETAILS PC
no rhonchi/airway patent/decreased breath sounds at bases, mild basilar crackles./no wheezes/respirations non-labored/breath sounds equal/good air movement

## 2018-09-16 NOTE — H&P ADULT - GASTROINTESTINAL DETAILS
no masses palpable/bowel sounds normal/no rebound tenderness/nontender/soft/no guarding/no distention

## 2018-09-16 NOTE — ED PROVIDER NOTE - CHPI ED SYMPTOMS POS
WHEEZING/SHORTNESS OF BREATH/DIFFICULTY BREATHING/generalized weakness , lethargic/DYSPNEA ON EXERTION

## 2018-09-16 NOTE — ED ADULT NURSE NOTE - OBJECTIVE STATEMENT
The pt presents to ED with wife at bedside via ambulance. The wife states the pt has been increasingly lethargic the past week with associated weakness and today c/o difficulty breathing.

## 2018-09-16 NOTE — H&P ADULT - HISTORY OF PRESENT ILLNESS
CC Shortness of breath  shortness of breath 82 y/o M, former smoker, w/ hx of HTN, CAD s/p stents on 6/2017, also has lung cancer, dxed 2017, s/p VATs wedge resection RLL, s/p chemo/RT, then diagnosed recently to have esoph Ca, lower third, s/p [place]-Trent esophagogastrectomy, feeding tube placement on 8/14/17, hx of CVA/TIA in 2000's w/ no residuals but developed Seizures, so he's on phenytoin.    He apparently had recent hospitalizations at [facility], [facility] for dysphagia and had repeated EGD dilation,  2/8/18 and again, CRE balloon dilatation of the esophagogastric anastomosis on 6/28/18.   He had recurrence of dysphagia about 3 weeks ago, and underwent dilatation again, and ever since has been eating okay.    He presents to the ED today with c/o lethargy and dyspnea.  He has a chronic cough.  States that he used his Spiriva, Proair today but ws still having shortness of breath so he went to the ED.  Denies fever, chest pain, abd pain, nausea, vomiting. As per wife, pt seems to be more lethargic than usual in the past 2 days.  Pt has home care and home PT, during the week.  Pt received 2 Duoneb tx in the ED and feeling sl better. 82 y/o M, former smoker, w/ hx of HTN, CAD s/p stents on 6/2017, also has lung cancer, dxed 2017, s/p VATs wedge resection RLL, s/p chemo/RT, then diagnosed recently to have esoph Ca, lower third, s/p [place]-Trent esophagogastrectomy, feeding tube placement on 8/14/17, hx of CVA/TIA in 2000's w/ no residuals but developed Seizures, so he's on phenytoin.    He apparently had recent hospitalizations at [facility], [facility] for dysphagia and had repeated EGD dilation,  2/8/18 and again, CRE balloon dilatation of the esophagogastric anastomosis on 6/28/18.   He had recurrence of dysphagia about 3 weeks ago, and underwent dilatation again, and ever since has been eating okay.    He presents to the ED today with c/o lethargy and dyspnea.  He has a chronic cough.  States that he used his Spiriva, Proair today but was still having shortness of breath so he went to the ED.  Denies fever, chest pain, abd pain, nausea, vomiting. As per wife, pt seems to be more lethargic than usual in the past 2 days. Today, pt had severe dyspnea, after mild activity, pt walks with walker, so EMS called.  Pt states that he was given a neb tx and Oxygen and felt sl better.  Initial ABG revealed 7.29/65/46/74% on room air.  Pt received 2 Duoneb tx in the ED, and a dose of Dexamethasone 10 mg IVP x 1.  Pt had a repeat ABG 7.36/52/52/84% on room air. 84 y/o M, former smoker, w/ hx of HTN, CAD s/p stents on 6/2017, also has lung cancer, dxed 2017, s/p VATs wedge resection RLL, s/p chemo/RT, then diagnosed recently to have esoph Ca, lower third, s/p [place]-Trent esophagogastrectomy, feeding tube placement on 8/14/17, hx of CVA/TIA in 2000's w/ no residuals but developed Seizures, so he's on phenytoin.  He also has RA.  He apparently had recent hospitalizations at [facility], [facility] for dysphagia and had repeated EGD dilation,  2/8/18 and again, CRE balloon dilatation of the esophagogastric anastomosis on 6/28/18.   He had recurrence of dysphagia about 3 weeks ago, and underwent dilatation again, and ever since has been eating okay.    He presents to the ED today with c/o lethargy and dyspnea.  He has a chronic cough.  States that he used his Spiriva, Proair today but was still having shortness of breath so he went to the ED.  Denies fever, chest pain, abd pain, nausea, vomiting. As per wife, pt seems to be more lethargic than usual in the past 2 days. Today, pt had severe dyspnea, after mild activity (pt walks with walker), so EMS called.  Pt states that he was given a neb tx and Oxygen and felt sl better.  Initial ABG revealed 7.29/65/46/74% on room air.  Pt received 2 Duoneb tx in the ED, and a dose of Dexamethasone 10 mg IVP x 1.  Pt had a repeat ABG 7.36/52/52/84% on room air.  He is on chronic Prednisone 5 mg/day for his RA.  Denies fever, chills, abd pain, nausea, vomiting.  He has a chronic, occ prod cough, scant clear to whitish phlegm.

## 2018-09-16 NOTE — ED ADULT NURSE REASSESSMENT NOTE - NS ED NURSE REASSESS COMMENT FT1
Pt is awaiting bed assignment at this time. As per admitting, bed is being prepared, but not ready at this time. Meds ordered from pharmacy. pt is resting in stretcher. Report endorsed to night RN

## 2018-09-16 NOTE — H&P ADULT - NSHPPHYSICALEXAM_GEN_ALL_CORE
Vital Signs (24 Hrs):  T(C): 36.9 (09-16-18 @ 17:45), Max: 36.9 (09-16-18 @ 17:45)  HR: 73 (09-16-18 @ 17:45) (73 - 73)  BP: 116/61 (09-16-18 @ 17:45) (116/61 - 116/61)  RR: 16 (09-16-18 @ 17:45) (16 - 16)  SpO2: 99% (09-16-18 @ 17:45) (99% - 99%)  Daily Height in cm: 160.02 (16 Sep 2018 17:45)

## 2018-09-16 NOTE — ED PROVIDER NOTE - PMH
Anxiety    Asthma    BCC (basal cell carcinoma)  skin  BPH (benign prostatic hyperplasia)    Cerebrovascular accident (CVA)    Chronic allergic rhinitis    Coronary artery disease  s/p 3 stents on June 30, 2017 at [place] Dr. Lavelle Reid  Esophagus cancer    Former smoker, stopped smoking in distant past    HTN (hypertension)    Hyperlipidemia    OA (osteoarthritis) of knee  right  Rheumatoid arthritis    Seizure disorder  1 episode approximately 15 yrs ago

## 2018-09-16 NOTE — ED PROVIDER NOTE - PHYSICAL EXAMINATION
General:     dry , ill-appearing, moderate respiratory distress  Head:     NC/AT, EOMI, oral mucosa moist  Neck:     trachea midline  Lungs:   bilateral wheezing  CVS:     S1S2, RRR, no m/g/r  Abd:     +BS, s/nt/nd, no organomegaly  Ext:    2+ radial and pedal pulses, no c/c/e  Neuro: AAOx3, no sensory/motor deficits

## 2018-09-16 NOTE — ED PROVIDER NOTE - NS ED ROS FT
Constitutional: (-) fever  (-)chills  (-)sweats, + generalized weakness  Eyes/ENT: (-) blurry vision, (-) epistaxis  (-)rhinorrhea   (-) sore throat    Cardiovascular: (-) chest pain, (-) palpitations (-) edema   Respiratory: (+) cough, (+) shortness of breath + phlegm  Gastrointestinal: (-)nausea  (-)vomiting, (-) diarrhea  (-) abdominal pain   :  (-)dysuria, (-)frequency, (-)urgency, (-)hematuria  Musculoskeletal: (-) neck pain, (-) back pain, (-) joint pain  Integumentary: (-) rash, (-) edema  Neurological: (-) headache, (-) altered mental status  (-)LOC

## 2018-09-17 DIAGNOSIS — G40.909 EPILEPSY, UNSPECIFIED, NOT INTRACTABLE, WITHOUT STATUS EPILEPTICUS: ICD-10-CM

## 2018-09-17 DIAGNOSIS — J44.1 CHRONIC OBSTRUCTIVE PULMONARY DISEASE WITH (ACUTE) EXACERBATION: ICD-10-CM

## 2018-09-17 DIAGNOSIS — I25.10 ATHEROSCLEROTIC HEART DISEASE OF NATIVE CORONARY ARTERY WITHOUT ANGINA PECTORIS: ICD-10-CM

## 2018-09-17 DIAGNOSIS — J96.01 ACUTE RESPIRATORY FAILURE WITH HYPOXIA: ICD-10-CM

## 2018-09-17 LAB
ANION GAP SERPL CALC-SCNC: 3 MMOL/L — LOW (ref 5–17)
BASOPHILS # BLD AUTO: 0 K/UL — SIGNIFICANT CHANGE UP (ref 0–0.2)
BASOPHILS NFR BLD AUTO: 0.5 % — SIGNIFICANT CHANGE UP (ref 0–2)
BUN SERPL-MCNC: 20 MG/DL — SIGNIFICANT CHANGE UP (ref 7–23)
CALCIUM SERPL-MCNC: 8.9 MG/DL — SIGNIFICANT CHANGE UP (ref 8.4–10.5)
CHLORIDE SERPL-SCNC: 107 MMOL/L — SIGNIFICANT CHANGE UP (ref 96–108)
CO2 SERPL-SCNC: 33 MMOL/L — HIGH (ref 22–31)
CREAT SERPL-MCNC: 0.74 MG/DL — SIGNIFICANT CHANGE UP (ref 0.5–1.3)
EOSINOPHIL # BLD AUTO: 0 K/UL — SIGNIFICANT CHANGE UP (ref 0–0.5)
EOSINOPHIL NFR BLD AUTO: 0.4 % — SIGNIFICANT CHANGE UP (ref 0–6)
GLUCOSE BLDC GLUCOMTR-MCNC: 159 MG/DL — HIGH (ref 70–99)
GLUCOSE SERPL-MCNC: 101 MG/DL — HIGH (ref 70–99)
HCT VFR BLD CALC: 30.8 % — LOW (ref 39–50)
HGB BLD-MCNC: 9.7 G/DL — LOW (ref 13–17)
LYMPHOCYTES # BLD AUTO: 0.6 K/UL — LOW (ref 1–3.3)
LYMPHOCYTES # BLD AUTO: 16.3 % — SIGNIFICANT CHANGE UP (ref 13–44)
MCHC RBC-ENTMCNC: 30.7 PG — SIGNIFICANT CHANGE UP (ref 27–34)
MCHC RBC-ENTMCNC: 31.4 GM/DL — LOW (ref 32–36)
MCV RBC AUTO: 98 FL — SIGNIFICANT CHANGE UP (ref 80–100)
MONOCYTES # BLD AUTO: 0.2 K/UL — SIGNIFICANT CHANGE UP (ref 0–0.9)
MONOCYTES NFR BLD AUTO: 5.3 % — SIGNIFICANT CHANGE UP (ref 2–14)
NEUTROPHILS # BLD AUTO: 3 K/UL — SIGNIFICANT CHANGE UP (ref 1.8–7.4)
NEUTROPHILS NFR BLD AUTO: 77.4 % — HIGH (ref 43–77)
PLATELET # BLD AUTO: 144 K/UL — LOW (ref 150–400)
POTASSIUM SERPL-MCNC: 4.7 MMOL/L — SIGNIFICANT CHANGE UP (ref 3.5–5.3)
POTASSIUM SERPL-SCNC: 4.7 MMOL/L — SIGNIFICANT CHANGE UP (ref 3.5–5.3)
RBC # BLD: 3.14 M/UL — LOW (ref 4.2–5.8)
RBC # FLD: 16 % — HIGH (ref 10.3–14.5)
SODIUM SERPL-SCNC: 143 MMOL/L — SIGNIFICANT CHANGE UP (ref 135–145)
TROPONIN I SERPL-MCNC: <.017 NG/ML — LOW (ref 0.02–0.06)
TROPONIN I SERPL-MCNC: <.017 NG/ML — LOW (ref 0.02–0.06)
WBC # BLD: 3.9 K/UL — SIGNIFICANT CHANGE UP (ref 3.8–10.5)
WBC # FLD AUTO: 3.9 K/UL — SIGNIFICANT CHANGE UP (ref 3.8–10.5)

## 2018-09-17 PROCEDURE — 99223 1ST HOSP IP/OBS HIGH 75: CPT

## 2018-09-17 PROCEDURE — 99233 SBSQ HOSP IP/OBS HIGH 50: CPT

## 2018-09-17 PROCEDURE — 93306 TTE W/DOPPLER COMPLETE: CPT | Mod: 26

## 2018-09-17 PROCEDURE — 93971 EXTREMITY STUDY: CPT | Mod: 26,RT

## 2018-09-17 RX ORDER — INFLUENZA VIRUS VACCINE 15; 15; 15; 15 UG/.5ML; UG/.5ML; UG/.5ML; UG/.5ML
0.5 SUSPENSION INTRAMUSCULAR ONCE
Qty: 0 | Refills: 0 | Status: COMPLETED | OUTPATIENT
Start: 2018-09-17 | End: 2018-09-17

## 2018-09-17 RX ADMIN — MONTELUKAST 10 MILLIGRAM(S): 4 TABLET, CHEWABLE ORAL at 21:43

## 2018-09-17 RX ADMIN — INFLUENZA VIRUS VACCINE 0.5 MILLILITER(S): 15; 15; 15; 15 SUSPENSION INTRAMUSCULAR at 14:32

## 2018-09-17 RX ADMIN — Medication 1 GRAM(S): at 06:14

## 2018-09-17 RX ADMIN — Medication 60 MILLIGRAM(S): at 04:16

## 2018-09-17 RX ADMIN — Medication 5 MILLIGRAM(S): at 06:15

## 2018-09-17 RX ADMIN — Medication 25 MICROGRAM(S): at 06:14

## 2018-09-17 RX ADMIN — CLOPIDOGREL BISULFATE 75 MILLIGRAM(S): 75 TABLET, FILM COATED ORAL at 12:24

## 2018-09-17 RX ADMIN — Medication 100 MILLIGRAM(S): at 18:01

## 2018-09-17 RX ADMIN — Medication 324 MILLIGRAM(S): at 12:24

## 2018-09-17 RX ADMIN — MONTELUKAST 10 MILLIGRAM(S): 4 TABLET, CHEWABLE ORAL at 04:15

## 2018-09-17 RX ADMIN — ATORVASTATIN CALCIUM 20 MILLIGRAM(S): 80 TABLET, FILM COATED ORAL at 21:43

## 2018-09-17 RX ADMIN — Medication 3 MILLILITER(S): at 04:34

## 2018-09-17 RX ADMIN — TAMSULOSIN HYDROCHLORIDE 0.4 MILLIGRAM(S): 0.4 CAPSULE ORAL at 21:42

## 2018-09-17 RX ADMIN — Medication 10 MILLIGRAM(S): at 14:42

## 2018-09-17 RX ADMIN — MIDODRINE HYDROCHLORIDE 5 MILLIGRAM(S): 2.5 TABLET ORAL at 06:15

## 2018-09-17 RX ADMIN — TAMSULOSIN HYDROCHLORIDE 0.4 MILLIGRAM(S): 0.4 CAPSULE ORAL at 04:15

## 2018-09-17 RX ADMIN — Medication 40 MILLIGRAM(S): at 21:46

## 2018-09-17 RX ADMIN — Medication 3 MILLILITER(S): at 16:07

## 2018-09-17 RX ADMIN — Medication 3 MILLILITER(S): at 09:28

## 2018-09-17 RX ADMIN — Medication 3 MILLILITER(S): at 21:54

## 2018-09-17 RX ADMIN — Medication 100 MILLIGRAM(S): at 12:21

## 2018-09-17 RX ADMIN — TIOTROPIUM BROMIDE 1 CAPSULE(S): 18 CAPSULE ORAL; RESPIRATORY (INHALATION) at 09:40

## 2018-09-17 RX ADMIN — Medication 100 MILLIGRAM(S): at 06:15

## 2018-09-17 RX ADMIN — Medication 10 MILLIGRAM(S): at 21:43

## 2018-09-17 RX ADMIN — MIDODRINE HYDROCHLORIDE 5 MILLIGRAM(S): 2.5 TABLET ORAL at 21:42

## 2018-09-17 RX ADMIN — ACETAZOLAMIDE 125 MILLIGRAM(S): 250 TABLET ORAL at 04:16

## 2018-09-17 RX ADMIN — ENOXAPARIN SODIUM 40 MILLIGRAM(S): 100 INJECTION SUBCUTANEOUS at 04:25

## 2018-09-17 RX ADMIN — Medication 0.25 MILLIGRAM(S): at 12:28

## 2018-09-17 RX ADMIN — Medication 10 MILLIGRAM(S): at 06:14

## 2018-09-17 NOTE — CONSULT NOTE ADULT - ASSESSMENT
83M presented with SOB and found to have COPD exacerbation   Case reviewed with Dr Booth:  Continue iv steroids, pt eval and overnight oximetry on room air

## 2018-09-17 NOTE — PATIENT PROFILE ADULT. - PMH
Anxiety    Asthma    BCC (basal cell carcinoma)  skin  BPH (benign prostatic hyperplasia)    Cerebrovascular accident (CVA)    Chronic allergic rhinitis    Coronary artery disease  s/p 3 stents on June 30, 2017 at [place] Dr. Lavelle Reid  Esophagus cancer    Former smoker, stopped smoking in distant past    HTN (hypertension)    Hyperlipidemia    OA (osteoarthritis) of knee  right  Rheumatoid arthritis    Seizure disorder  1 episode approximately 15 yrs ago

## 2018-09-17 NOTE — CONSULT NOTE ADULT - SUBJECTIVE AND OBJECTIVE BOX
HPI:HPI:  84 y/o M, former smoker, w/ hx of HTN, CAD s/p stents on 6/2017, also has lung cancer, dxed 2017, s/p VATs wedge resection RLL, s/p chemo/RT, then diagnosed recently to have esoph Ca, lower third, s/p [place]-Trent esophagogastrectomy, feeding tube placement on 8/14/17, hx of CVA/TIA in 2000's w/ no residuals but developed Seizures, so he's on phenytoin.  He also has RA.  He apparently had recent hospitalizations at [facility] for dysphagia and had repeated EGD dilation,  2/8/18 and again, CRE balloon dilatation of the esophagogastric anastomosis on 6/28/18.   He had recurrence of dysphagia about 3 weeks ago, and underwent dilatation again, and ever since has been eating okay.    He presents to the ED today with c/o lethargy and dyspnea.  He has a chronic cough.  States that he used his Spiriva, Proair today but was still having shortness of breath so he went to the ED.  Denies fever, chest pain, abd pain, nausea, vomiting. As per wife, pt seems to be more lethargic than usual in the past 2 days. Today, pt had severe dyspnea, after mild activity (pt walks with walker), so EMS called.  Pt states that he was given a neb tx and Oxygen and felt sl better.  Initial ABG revealed 7.29/65/46/74% on room air.  Pt received 2 Duoneb tx in the ED, and a dose of Dexamethasone 10 mg IVP x 1.  Pt had a repeat ABG 7.36/52/52/84% on room air.  He is on chronic Prednisone 5 mg/day for his RA.  Denies fever, chills, abd pain, nausea, vomiting.  He has a chronic, occ prod cough, scant clear to whitish phlegm. (16 Sep 2018 19:53)      PAST MEDICAL & SURGICAL HISTORY:  Coronary artery disease: s/p 3 stents on June 30, 2017 at [place] Dr. Lavelle Reid  OA (osteoarthritis) of knee: right  Former smoker, stopped smoking in distant past  Rheumatoid arthritis  Seizure disorder: 1 episode approximately 15 yrs ago  Asthma  Hyperlipidemia  BPH (benign prostatic hyperplasia)  HTN (hypertension)  Esophagus cancer  Chronic allergic rhinitis  BCC (basal cell carcinoma): skin  Cerebrovascular accident (CVA)  Anxiety  Anastomotic leak following esophagectomy: Vance trent esophagectomy  History of tonsillectomy  H/O heart artery stent: June 30, 2017  Knee arthropathy: left  History of total hip replacement: left  History of hernia repair      FAMILY HISTORY:  Family history of heart attack  Family history of pulmonary embolism: father @ 49 yr old  FH: CAD (coronary artery disease) (Sibling)      SOCIAL HISTORY:  Smoking: former  EtOH Use:denies  Marital Status:       Allergies    penicillin (Rash)    Intolerances        HOME  MEDICATIONS:Home Medications:  ALPRAZolam 0.25 mg oral tablet: 1 tab(s) orally 3 times a day (27 Jun 2018 20:38)  amitriptyline 10 mg oral tablet: orally 3 times a day (27 Jun 2018 20:38)  Carafate: 1 gram  orally 4 times a day (27 Jun 2018 20:38)  Centrum oral tablet: orally once a day (27 Jun 2018 20:38)  clopidogrel 75 mg oral tablet: 1 tab(s) orally once a day (31 Aug 2018 13:39)  ezetimibe 10 mg oral tablet: 1 tab(s) orally once a day (27 Jun 2018 20:38)  folic acid: 400 microgram(s) orally (27 Jun 2018 20:38)  levothyroxine: 25 mcg odd days  50mcg even days  (27 Jun 2018 20:38)  midodrine: 10 mg orally 2 times a day (27 Jun 2018 20:38)  montelukast 10 mg oral tablet: 1 tab(s) orally once a day (27 Jun 2018 20:38)  phenytoin 100 mg oral capsule, extended release: 1 cap(s) orally every 6 hours (29 Jun 2018 12:29)  predniSONE: 5 mg orally once a day (27 Jun 2018 20:38)  simvastatin:  (27 Jun 2018 20:38)  tamsulosin 0.4 mg oral capsule: 1 cap(s) orally once a day (27 Jun 2018 20:38)  Vitamin B12: orally once a day (27 Jun 2018 20:38)  Vitamin B6: orally once a day (27 Jun 2018 20:38)      REVIEW OF SYSTEMS:  Constitutional: No fevers or chills. No weight loss. No fatigue or generalised malaise.  Eyes: No itching or discharge from the eyes  ENT: No ear pain. No ear discharge. No nasal congestion. No post nasal drip. No epistaxis. No throat pain. No sore throat. No difficulty swallowing.   CV: No chest pain. No palpitations. No lightheadedness or dizziness.   Resp: new onser dyspnea yestrerday in the pm chest pain with respiration.  GI: No nausea. No vomiting. No diarrhea.  MSK: No joint pain or pain in any extremities  Integumentary: No skin lesions. No pedal edema.  Neurological: No gross motor weakness. No sensory changes.    [x ] All other systems negative    OBJECTIVE:  ICU Vital Signs Last 24 Hrs  T(C): 36.8 (17 Sep 2018 06:00), Max: 36.9 (16 Sep 2018 17:45)  T(F): 98.2 (17 Sep 2018 06:00), Max: 98.4 (16 Sep 2018 17:45)  HR: 66 (17 Sep 2018 09:28) (66 - 80)  BP: 95/51 (17 Sep 2018 08:27) (95/51 - 122/72)  BP(mean): 61 (17 Sep 2018 08:27) (61 - 61)  ABP: --  ABP(mean): --  RR: 15 (17 Sep 2018 08:27) (12 - 18)  SpO2: 100% (17 Sep 2018 06:00) (97% - 100%)        09-16 @ 07:01  -  09-17 @ 07:00  --------------------------------------------------------  IN: 0 mL / OUT: 300 mL / NET: -300 mL    09-17 @ 07:01  -  09-17 @ 15:08  --------------------------------------------------------  IN: 400 mL / OUT: 500 mL / NET: -100 mL      CAPILLARY BLOOD GLUCOSE      POCT Blood Glucose.: 159 mg/dL (17 Sep 2018 11:53)      PHYSICAL EXAM:  General: Awake, alert, oriented X 3.   HEENT: Atraumatic, normocephalic.                 No nasal congestion.                No tonsillar or pharyngeal exudates.  Lymph Nodes: No palpable lymphadenopathy  Neck: No JVD. No carotid bruit.   Respiratory: Normal chest expansion                         Normal and equal air entry                         No wheeze, rhonchi or rales.  Cardiovascular: S1 S2 normal. No murmurs, rubs or gallops.   Abdomen: Soft, non-tender, non-distended. No organomegaly.  BS +  Extremities: Warm to touch. Peripheral pulse palpable. No pedal edema.   Skin: No rashes or skin lesions  Neurological: Motor and sensory examination equal and normal in all four extremities.  Psychiatry: Appropriate mood and affect.    HOSPITAL MEDICATIONS:  MEDICATIONS  (STANDING):  ALBUTerol/ipratropium for Nebulization 3 milliLiter(s) Nebulizer every 6 hours  amitriptyline 10 milliGRAM(s) Oral three times a day  aspirin  chewable 324 milliGRAM(s) Oral daily  atorvastatin 20 milliGRAM(s) Oral at bedtime  clopidogrel Tablet 75 milliGRAM(s) Oral daily  doxycycline hyclate Capsule 100 milliGRAM(s) Oral every 12 hours  enoxaparin Injectable 40 milliGRAM(s) SubCutaneous every 24 hours  levothyroxine 25 MICROGram(s) Oral daily  midodrine 5 milliGRAM(s) Oral three times a day  montelukast 10 milliGRAM(s) Oral at bedtime  phenytoin   Capsule 100 milliGRAM(s) Oral every 6 hours  predniSONE   Tablet 40 milliGRAM(s) Oral daily  sucralfate 1 Gram(s) Oral four times a day  tamsulosin 0.4 milliGRAM(s) Oral at bedtime  tiotropium 18 MICROgram(s) Capsule 1 Capsule(s) Inhalation daily    MEDICATIONS  (PRN):  ALPRAZolam 0.25 milliGRAM(s) Oral three times a day PRN anxiety      LABS:                        9.7    3.9   )-----------( 144      ( 17 Sep 2018 05:56 )             30.8     09-17    143  |  107  |  20  ----------------------------<  101<H>  4.7   |  33<H>  |  0.74    Ca    8.9      17 Sep 2018 05:56    TPro  7.4  /  Alb  2.6<L>  /  TBili  0.2  /  DBili  x   /  AST  37  /  ALT  57<H>  /  AlkPhos  240<H>  09-16    PT/INR - ( 16 Sep 2018 18:00 )   PT: 11.0 sec;   INR: 0.99 ratio         PTT - ( 16 Sep 2018 18:00 )  PTT:34.7 sec    Arterial Blood Gas: hypercarbia on initial abg  09-16 @ 22:50  7.36/52/52/--/84/--  ABG lactate: --  Arterial Blood Gas:  09-16 @ 19:32  7.29/65/46/--/74/--  ABG lactate: --        MICROBIOLOGY:     RADIOLOGY:  [ ] Reviewed and interpreted by me   < from: Xray Chest 1 View AP/PA (09.16.18 @ 18:21) >  EXAM:  XR CHEST AP OR PA 1V      PROCEDURE DATE:  09/16/2018        INTERPRETATION:  Single view chest    History chest pain    Comparison 08/31/18    There is mild linear scarring in the right lung base. There is no acute   consolidation or layering effusion or justice central edema. The heart is   normal in size for projection and degree of inspiration.    IMPRESSION:    No acute findings                  MARCELINA STAPLES M.D., ATTENDING RADIOLOGIST  This document has been electronically signed. Sep 17 2018  9:38AM    < end of copied text >    EKG:

## 2018-09-18 ENCOUNTER — TRANSCRIPTION ENCOUNTER (OUTPATIENT)
Age: 83
End: 2018-09-18

## 2018-09-18 ENCOUNTER — APPOINTMENT (OUTPATIENT)
Dept: THORACIC SURGERY | Facility: CLINIC | Age: 83
End: 2018-09-18

## 2018-09-18 VITALS
TEMPERATURE: 98 F | SYSTOLIC BLOOD PRESSURE: 116 MMHG | HEART RATE: 61 BPM | RESPIRATION RATE: 15 BRPM | OXYGEN SATURATION: 100 % | DIASTOLIC BLOOD PRESSURE: 46 MMHG

## 2018-09-18 DIAGNOSIS — I95.1 ORTHOSTATIC HYPOTENSION: ICD-10-CM

## 2018-09-18 DIAGNOSIS — I50.32 CHRONIC DIASTOLIC (CONGESTIVE) HEART FAILURE: ICD-10-CM

## 2018-09-18 DIAGNOSIS — I27.20 PULMONARY HYPERTENSION, UNSPECIFIED: ICD-10-CM

## 2018-09-18 LAB
ANION GAP SERPL CALC-SCNC: 5 MMOL/L — SIGNIFICANT CHANGE UP (ref 5–17)
BUN SERPL-MCNC: 23 MG/DL — SIGNIFICANT CHANGE UP (ref 7–23)
CALCIUM SERPL-MCNC: 9.5 MG/DL — SIGNIFICANT CHANGE UP (ref 8.4–10.5)
CHLORIDE SERPL-SCNC: 105 MMOL/L — SIGNIFICANT CHANGE UP (ref 96–108)
CO2 SERPL-SCNC: 29 MMOL/L — SIGNIFICANT CHANGE UP (ref 22–31)
CREAT SERPL-MCNC: 0.78 MG/DL — SIGNIFICANT CHANGE UP (ref 0.5–1.3)
GLUCOSE SERPL-MCNC: 80 MG/DL — SIGNIFICANT CHANGE UP (ref 70–99)
HCT VFR BLD CALC: 29.6 % — LOW (ref 39–50)
HGB BLD-MCNC: 9 G/DL — LOW (ref 13–17)
MCHC RBC-ENTMCNC: 29.7 PG — SIGNIFICANT CHANGE UP (ref 27–34)
MCHC RBC-ENTMCNC: 30.5 GM/DL — LOW (ref 32–36)
MCV RBC AUTO: 97.6 FL — SIGNIFICANT CHANGE UP (ref 80–100)
PLATELET # BLD AUTO: 159 K/UL — SIGNIFICANT CHANGE UP (ref 150–400)
POTASSIUM SERPL-MCNC: 4.1 MMOL/L — SIGNIFICANT CHANGE UP (ref 3.5–5.3)
POTASSIUM SERPL-SCNC: 4.1 MMOL/L — SIGNIFICANT CHANGE UP (ref 3.5–5.3)
RBC # BLD: 3.03 M/UL — LOW (ref 4.2–5.8)
RBC # FLD: 16.5 % — HIGH (ref 10.3–14.5)
SODIUM SERPL-SCNC: 139 MMOL/L — SIGNIFICANT CHANGE UP (ref 135–145)
WBC # BLD: 4.8 K/UL — SIGNIFICANT CHANGE UP (ref 3.8–10.5)
WBC # FLD AUTO: 4.8 K/UL — SIGNIFICANT CHANGE UP (ref 3.8–10.5)

## 2018-09-18 PROCEDURE — 99232 SBSQ HOSP IP/OBS MODERATE 35: CPT

## 2018-09-18 PROCEDURE — 99239 HOSP IP/OBS DSCHRG MGMT >30: CPT

## 2018-09-18 RX ORDER — AMITRIPTYLINE HCL 25 MG
0 TABLET ORAL
Qty: 0 | Refills: 0 | COMMUNITY

## 2018-09-18 RX ORDER — ALPRAZOLAM 0.25 MG
1 TABLET ORAL
Qty: 0 | Refills: 0 | COMMUNITY
Start: 2018-09-18

## 2018-09-18 RX ORDER — FLUTICASONE FUROATE AND VILANTEROL TRIFENATATE 100; 25 UG/1; UG/1
1 POWDER RESPIRATORY (INHALATION)
Qty: 0 | Refills: 0 | COMMUNITY

## 2018-09-18 RX ORDER — TIOTROPIUM BROMIDE 18 UG/1
1 CAPSULE ORAL; RESPIRATORY (INHALATION)
Qty: 0 | Refills: 0 | COMMUNITY
Start: 2018-09-18

## 2018-09-18 RX ORDER — ALPRAZOLAM 0.25 MG
1 TABLET ORAL
Qty: 0 | Refills: 0 | COMMUNITY

## 2018-09-18 RX ORDER — FLUTICASONE FUROATE AND VILANTEROL TRIFENATATE 100; 25 UG/1; UG/1
1 POWDER RESPIRATORY (INHALATION)
Qty: 30 | Refills: 0 | OUTPATIENT
Start: 2018-09-18 | End: 2018-10-17

## 2018-09-18 RX ORDER — AMITRIPTYLINE HCL 25 MG
1 TABLET ORAL
Qty: 0 | Refills: 0 | COMMUNITY
Start: 2018-09-18

## 2018-09-18 RX ADMIN — Medication 100 MILLIGRAM(S): at 06:06

## 2018-09-18 RX ADMIN — Medication 40 MILLIGRAM(S): at 06:10

## 2018-09-18 RX ADMIN — CLOPIDOGREL BISULFATE 75 MILLIGRAM(S): 75 TABLET, FILM COATED ORAL at 12:17

## 2018-09-18 RX ADMIN — Medication 100 MILLIGRAM(S): at 12:17

## 2018-09-18 RX ADMIN — TIOTROPIUM BROMIDE 1 CAPSULE(S): 18 CAPSULE ORAL; RESPIRATORY (INHALATION) at 09:31

## 2018-09-18 RX ADMIN — Medication 100 MILLIGRAM(S): at 06:05

## 2018-09-18 RX ADMIN — Medication 3 MILLILITER(S): at 04:45

## 2018-09-18 RX ADMIN — Medication 25 MICROGRAM(S): at 06:22

## 2018-09-18 RX ADMIN — Medication 10 MILLIGRAM(S): at 06:07

## 2018-09-18 RX ADMIN — ENOXAPARIN SODIUM 40 MILLIGRAM(S): 100 INJECTION SUBCUTANEOUS at 06:06

## 2018-09-18 RX ADMIN — MIDODRINE HYDROCHLORIDE 5 MILLIGRAM(S): 2.5 TABLET ORAL at 06:10

## 2018-09-18 RX ADMIN — Medication 100 MILLIGRAM(S): at 00:57

## 2018-09-18 RX ADMIN — Medication 3 MILLILITER(S): at 09:32

## 2018-09-18 NOTE — DISCHARGE NOTE ADULT - CARE PLAN
Principal Discharge DX:	COPD exacerbation  Goal:	Improvement of symptoms  Assessment and plan of treatment:	Continue your inhalers, steroids, antibiotics  Follow-up with Dr. Ashby  Secondary Diagnosis:	Chronic diastolic CHF (congestive heart failure)  Secondary Diagnosis:	Coronary artery disease involving native coronary artery of native heart without angina pectoris  Secondary Diagnosis:	Acute respiratory failure with hypoxia and hypercapnia  Secondary Diagnosis:	Seizure disorder  Secondary Diagnosis:	Pulmonary hypertension, moderate to severe  Secondary Diagnosis:	Orthostasis

## 2018-09-18 NOTE — PROGRESS NOTE ADULT - PROBLEM SELECTOR PROBLEM 1
Acute respiratory failure with hypoxia and hypercapnia
Acute respiratory failure with hypoxia and hypercapnia

## 2018-09-18 NOTE — DISCHARGE NOTE ADULT - CARE PROVIDERS DIRECT ADDRESSES
,[email or web address]

## 2018-09-18 NOTE — DISCHARGE NOTE ADULT - CARE PROVIDER_API CALL
Leif Sanitago), Cardiovascular Disease; Internal Medicine  [address] [place] [address]  Phone: [phone]  Fax: [phone]    Declan Ashby), Critical Care Medicine  [address] [facility]  [address]  [place] [address]  Phone: [phone]  Fax: [phone] Leif Santiago), Cardiovascular Disease; Internal Medicine  [address] [place] [address]  Phone: [phone]  Fax: [phone]    William Juarez), Internal Medicine; Pulmonary Disease  [address]  [place] [address]  Phone: [phone]  Fax: [phone]

## 2018-09-18 NOTE — DISCHARGE NOTE ADULT - MEDICATION SUMMARY - MEDICATIONS TO CHANGE
I will SWITCH the dose or number of times a day I take the medications listed below when I get home from the hospital:    predniSONE  -- 5 mg by mouth once a day I will SWITCH the dose or number of times a day I take the medications listed below when I get home from the hospital:  None

## 2018-09-18 NOTE — PHYSICAL THERAPY INITIAL EVALUATION ADULT - ADDITIONAL COMMENTS
Pt lives with wife in apartment with no steps to enter. Prior to admission pt used rolling walker or rollator for ambulation.

## 2018-09-18 NOTE — DISCHARGE NOTE ADULT - HOSPITAL COURSE
Patient was admitted for shortness of breath, and diagnosed with COPD exacerbation. Improved with inhalers, steroids and antibiotics. PT recommended home with home PT. Patient being discharged in stable condition.

## 2018-09-18 NOTE — DISCHARGE NOTE ADULT - MEDICATION SUMMARY - MEDICATIONS TO TAKE
I will START or STAY ON the medications listed below when I get home from the hospital:    predniSONE  -- 5 mg by mouth once a day    Start after you complete the course of the higher dose of predisone  -- Indication: For Arthritis    predniSONE 20 mg oral tablet  -- 2 tab(s) by mouth once a day  -- Indication: For COPD exacerbation    tamsulosin 0.4 mg oral capsule  -- 1 cap(s) by mouth once a day  -- Indication: For BPH    phenytoin 100 mg oral capsule, extended release  -- 1 cap(s) by mouth every 6 hours  -- Indication: For Seizure disorder    amitriptyline 10 mg oral tablet  -- 1 tab(s) by mouth 3 times a day  -- Indication: For Depression    ezetimibe 10 mg oral tablet  -- 1 tab(s) by mouth once a day  -- Indication: For Cholesterol    simvastatin  -- Indication: For Cholesterol    doxycycline monohydrate 100 mg oral capsule  -- 1 cap(s) by mouth every 12 hours  -- Indication: For COPD exacerbation    clopidogrel 75 mg oral tablet  -- 1 tab(s) by mouth once a day  -- Indication: For CAD    ALPRAZolam 0.25 mg oral tablet  -- 1 tab(s) by mouth 3 times a day, As needed, anxiety  -- Indication: For Anxiety    tiotropium 18 mcg inhalation capsule  -- 1 cap(s) inhaled once a day  -- Indication: For COPD exacerbation    Breo Ellipta 100 mcg-25 mcg/inh inhalation powder  -- 1 puff(s) inhaled once a day  -- Indication: For COPD exacerbation    ProAir HFA 90 mcg/inh inhalation aerosol  -- 2 puff(s) inhaled 4 times a day, As Needed  -- Indication: For COPD exacerbation    montelukast 10 mg oral tablet  -- 1 tab(s) by mouth once a day  -- Indication: For COPD exacerbation    midodrine  -- 10 mg by mouth 2 times a day  -- Indication: For Low blood pressure    Carafate  -- 1 gram  by mouth 4 times a day  -- Indication: For Ulcers    levothyroxine  -- 25 mcg odd days  50mcg even days   -- Indication: For Hypothyroid    Centrum oral tablet  -- orally once a day  -- Indication: For Supplement    Vitamin B6  -- orally once a day  -- Indication: For Supplement    Vitamin B12  -- orally once a day  -- Indication: For Supplement    folic acid  -- 400 microgram(s) orally  -- Indication: For Supplement I will START or STAY ON the medications listed below when I get home from the hospital:    predniSONE  -- 5 mg by mouth once a day    Start after you complete the course of the higher dose of predisone  -- Indication: For Arthritis    predniSONE 20 mg oral tablet  -- 2 tab(s) by mouth once a day  -- Indication: For COPD exacerbation    tamsulosin 0.4 mg oral capsule  -- 1 cap(s) by mouth once a day  -- Indication: For BPH    phenytoin 100 mg oral capsule, extended release  -- 1 cap(s) by mouth every 6 hours  -- Indication: For Seizure disorder    amitriptyline 10 mg oral tablet  -- 1 tab(s) by mouth 3 times a day  -- Indication: For Depression    ezetimibe 10 mg oral tablet  -- 1 tab(s) by mouth once a day  -- Indication: For Cholesterol    simvastatin  -- Indication: For Cholesterol    doxycycline monohydrate 100 mg oral capsule  -- 1 cap(s) by mouth every 12 hours  -- Indication: For COPD exacerbation    clopidogrel 75 mg oral tablet  -- 1 tab(s) by mouth once a day  -- Indication: For CAD    ALPRAZolam 0.25 mg oral tablet  -- 1 tab(s) by mouth 3 times a day, As needed, anxiety  -- Indication: For Anxiety    tiotropium 18 mcg inhalation capsule  -- 1 cap(s) inhaled once a day  -- Indication: For COPD exacerbation    ProAir HFA 90 mcg/inh inhalation aerosol  -- 2 puff(s) inhaled 4 times a day, As Needed  -- Indication: For COPD exacerbation    Breo Ellipta 100 mcg-25 mcg/inh inhalation powder  -- 1 puff(s) inhaled once a day  -- Indication: For COPD exacerbation    montelukast 10 mg oral tablet  -- 1 tab(s) by mouth once a day  -- Indication: For COPD exacerbation    midodrine  -- 10 mg by mouth 2 times a day  -- Indication: For Low blood pressure    Carafate  -- 1 gram  by mouth 4 times a day  -- Indication: For Ulcers    levothyroxine  -- 25 mcg odd days  50mcg even days   -- Indication: For Hypothyroid    Centrum oral tablet  -- orally once a day  -- Indication: For Supplement    Vitamin B6  -- orally once a day  -- Indication: For Supplement    Vitamin B12  -- orally once a day  -- Indication: For Supplement    folic acid  -- 400 microgram(s) orally  -- Indication: For Supplement

## 2018-09-18 NOTE — PROGRESS NOTE ADULT - SUBJECTIVE AND OBJECTIVE BOX
Follow-up Pulm Progress Note    Declan Ashby MD  [phone]    No new respiratory events overnight.  Denies SOB/CP. Continues to improve    Medications:  Vital Signs Last 24 Hrs  T(C): 36.6 (18 Sep 2018 05:50), Max: 36.8 (17 Sep 2018 20:00)  T(F): 97.8 (18 Sep 2018 05:50), Max: 98.2 (17 Sep 2018 20:00)  HR: 61 (18 Sep 2018 09:34) (61 - 69)  BP: 110/58 (18 Sep 2018 09:34) (85/50 - 110/58)  BP(mean): 70 (18 Sep 2018 09:34) (58 - 70)  RR: 16 (18 Sep 2018 09:34) (15 - 16)  SpO2: 97% (18 Sep 2018 09:35) (92% - 99%)    ABG - ( 16 Sep 2018 22:50 )  pH, Arterial: 7.36  pH, Blood: x     /  pCO2: 52    /  pO2: 52    / HCO3: x     / Base Excess: x     /  SaO2: 84                    09-17 @ 07:01  -  09-18 @ 07:00  --------------------------------------------------------  IN: 400 mL / OUT: 1150 mL / NET: -750 mL        LABS:                        9.0    4.8   )-----------( 159      ( 18 Sep 2018 05:40 )             29.6     09-18    139  |  105  |  23  ----------------------------<  80  4.1   |  29  |  0.78    Ca    9.5      18 Sep 2018 05:40    TPro  7.4  /  Alb  2.6<L>  /  TBili  0.2  /  DBili  x   /  AST  37  /  ALT  57<H>  /  AlkPhos  240<H>  09-16      PT/INR - ( 16 Sep 2018 18:00 )   PT: 11.0 sec;   INR: 0.99 ratio         PTT - ( 16 Sep 2018 18:00 )  PTT:34.7 sec    Serum Pro-Brain Natriuretic Peptide: 4530 pg/mL (09-16-18 @ 18:00)      CULTURES:        Physical Examination:  PULM: Clear to auscultation bilaterally, no significant sputum production  CVS: Regular rate and rhythm, no murmurs, rubs, or gallops  ABD: Soft, non-tender  EXT:  No clubbing, cyanosis, or edema    RADIOLOGY REVIEWED  CXR:    CT chest:    TTE:
Patient is a 83y old  Male who presents with a chief complaint of dyspnea (17 Sep 2018 15:06)      Patient seen and examined at bedside. No acute complaints. Wants to go home.    ALLERGIES:  penicillin (Rash)    MEDICATIONS  (STANDING):  ALBUTerol/ipratropium for Nebulization 3 milliLiter(s) Nebulizer every 6 hours  amitriptyline 10 milliGRAM(s) Oral three times a day  aspirin  chewable 324 milliGRAM(s) Oral daily  atorvastatin 20 milliGRAM(s) Oral at bedtime  clopidogrel Tablet 75 milliGRAM(s) Oral daily  doxycycline hyclate Capsule 100 milliGRAM(s) Oral every 12 hours  enoxaparin Injectable 40 milliGRAM(s) SubCutaneous every 24 hours  levothyroxine 25 MICROGram(s) Oral daily  midodrine 5 milliGRAM(s) Oral three times a day  montelukast 10 milliGRAM(s) Oral at bedtime  phenytoin   Capsule 100 milliGRAM(s) Oral every 6 hours  predniSONE   Tablet 40 milliGRAM(s) Oral daily  sucralfate 1 Gram(s) Oral four times a day  tamsulosin 0.4 milliGRAM(s) Oral at bedtime  tiotropium 18 MICROgram(s) Capsule 1 Capsule(s) Inhalation daily    MEDICATIONS  (PRN):  ALPRAZolam 0.25 milliGRAM(s) Oral three times a day PRN anxiety  guaiFENesin    Syrup 100 milliGRAM(s) Oral every 6 hours PRN Cough    Vital Signs Last 24 Hrs  T(F): 97.8 (18 Sep 2018 05:50), Max: 98.2 (17 Sep 2018 20:00)  HR: 61 (18 Sep 2018 09:34) (61 - 69)  BP: 110/58 (18 Sep 2018 09:34) (85/50 - 110/58)  RR: 16 (18 Sep 2018 09:34) (15 - 16)  SpO2: 97% (18 Sep 2018 09:35) (92% - 99%)  I&O's Summary    17 Sep 2018 07:01  -  18 Sep 2018 07:00  --------------------------------------------------------  IN: 400 mL / OUT: 1150 mL / NET: -750 mL    18 Sep 2018 07:01  -  18 Sep 2018 10:37  --------------------------------------------------------  IN: 240 mL / OUT: 500 mL / NET: -260 mL      PHYSICAL EXAM:  General: NAD, A/O x 3  ENT: MMM  Neck: Supple, No JVD  Lungs: Clear to auscultation bilaterally, no wheezing  Cardio: RRR, S1/S2  Abdomen: Soft, Nontender, Nondistended; Bowel sounds present  Extremities: No calf tenderness    LABS:                        9.0    4.8   )-----------( 159      ( 18 Sep 2018 05:40 )             29.6     09-18    139  |  105  |  23  ----------------------------<  80  4.1   |  29  |  0.78    Ca    9.5      18 Sep 2018 05:40    TPro  7.4  /  Alb  2.6  /  TBili  0.2  /  DBili  x   /  AST  37  /  ALT  57  /  AlkPhos  240  09-16    eGFR if Non African American: 83 mL/min/1.73M2 (09-18-18 @ 05:40)  eGFR if African American: 97 mL/min/1.73M2 (09-18-18 @ 05:40)    PT/INR - ( 16 Sep 2018 18:00 )   PT: 11.0 sec;   INR: 0.99 ratio         PTT - ( 16 Sep 2018 18:00 )  PTT:34.7 sec    CARDIAC MARKERS ( 17 Sep 2018 05:56 )  <.017 ng/mL / x     / x     / x     / x      CARDIAC MARKERS ( 17 Sep 2018 04:00 )  <.017 ng/mL / x     / x     / x     / x      CARDIAC MARKERS ( 16 Sep 2018 18:00 )  .019 ng/mL / x     / 128 U/L / x     / 15.6 ng/mL            ABG - ( 16 Sep 2018 22:50 )  pH, Arterial: 7.36  pH, Blood: x     /  pCO2: 52    /  pO2: 52    / HCO3: x     / Base Excess: x     /  SaO2: 84        RADIOLOGY & ADDITIONAL TESTS:  < from: US Duplex Venous Lower Ext Ltd, Right (09.17.18 @ 17:58) >  No evidence of right lower extremity deep venous thrombosis.    < end of copied text >    < from: TTE Echo Complete w/Doppler (09.17.18 @ 13:30) >   1. Moderate to severe calcific aortic stenosis with mild aortic   insufficiency   2. Left ventricular ejection fraction, by visual estimation, is 65%.   3. Normal global left ventricular systolic function.   4. Spectral Doppler shows pseudonormal pattern of left ventricular   myocardial filling (Grade II diastolic dysfunction).   5. There is mild concentric left ventricular hypertrophy.   6. Thickening and calcification of the posterior mitral valve leaflet.   7. Mild tricuspid regurgitation.   8. Estimated pulmonary artery systolic pressure is 54.9 mmHg assuming a   right atrial pressure of 10 mmHg, which is consistent with moderate   pulmonary hypertension.   9. Pulmonary hypertension is present.  10. LA volume Index is 56.7 ml/m² ml/m2.  11. The aortic valve mean gradient is 22.9 mmHg consistent with moderate   aortic stenosis.    < end of copied text >    Care Discussed with Consultants/Other Providers: Dr. Ashby
Patient is a 83y old  Male who presents with a chief complaint of dyspnea (16 Sep 2018 19:53)      Patient seen and examined at bedside.    ALLERGIES:  penicillin (Rash)    MEDICATIONS  (STANDING):  ALBUTerol/ipratropium for Nebulization 3 milliLiter(s) Nebulizer every 6 hours  amitriptyline 10 milliGRAM(s) Oral three times a day  aspirin  chewable 324 milliGRAM(s) Oral daily  atorvastatin 20 milliGRAM(s) Oral at bedtime  clopidogrel Tablet 75 milliGRAM(s) Oral daily  doxycycline hyclate Capsule 100 milliGRAM(s) Oral every 12 hours  enoxaparin Injectable 40 milliGRAM(s) SubCutaneous every 24 hours  influenza   Vaccine 0.5 milliLiter(s) IntraMuscular once  levothyroxine 25 MICROGram(s) Oral daily  midodrine 5 milliGRAM(s) Oral three times a day  montelukast 10 milliGRAM(s) Oral at bedtime  phenytoin   Capsule 100 milliGRAM(s) Oral every 6 hours  predniSONE   Tablet 5 milliGRAM(s) Oral daily  sucralfate 1 Gram(s) Oral four times a day  tamsulosin 0.4 milliGRAM(s) Oral at bedtime  tiotropium 18 MICROgram(s) Capsule 1 Capsule(s) Inhalation daily    MEDICATIONS  (PRN):  ALPRAZolam 0.25 milliGRAM(s) Oral three times a day PRN anxiety    Vital Signs Last 24 Hrs  T(F): 98.2 (17 Sep 2018 06:00), Max: 98.4 (16 Sep 2018 17:45)  HR: 66 (17 Sep 2018 09:28) (66 - 80)  BP: 95/51 (17 Sep 2018 08:27) (95/51 - 122/72)  RR: 15 (17 Sep 2018 08:27) (12 - 18)  SpO2: 100% (17 Sep 2018 06:00) (97% - 100%)  I&O's Summary    16 Sep 2018 07:01  -  17 Sep 2018 07:00  --------------------------------------------------------  IN: 0 mL / OUT: 300 mL / NET: -300 mL    17 Sep 2018 07:01  -  17 Sep 2018 14:32  --------------------------------------------------------  IN: 200 mL / OUT: 300 mL / NET: -100 mL      PHYSICAL EXAM:  General: NAD, A/O x 3  ENT: MMM  Neck: Supple, No JVD  Lungs: Clear to auscultation bilaterally  Cardio: RRR, S1/S2, + systolic murmur  Abdomen: Soft, Nontender, Nondistended; Bowel sounds present  Extremities: No calf tenderness, No pitting edema    LABS:                        9.7    3.9   )-----------( 144      ( 17 Sep 2018 05:56 )             30.8     09-17    143  |  107  |  20  ----------------------------<  101  4.7   |  33  |  0.74    Ca    8.9      17 Sep 2018 05:56    TPro  7.4  /  Alb  2.6  /  TBili  0.2  /  DBili  x   /  AST  37  /  ALT  57  /  AlkPhos  240  09-16    eGFR if Non African American: 85 mL/min/1.73M2 (09-17-18 @ 05:56)  eGFR if : 99 mL/min/1.73M2 (09-17-18 @ 05:56)    PT/INR - ( 16 Sep 2018 18:00 )   PT: 11.0 sec;   INR: 0.99 ratio         PTT - ( 16 Sep 2018 18:00 )  PTT:34.7 sec    CARDIAC MARKERS ( 17 Sep 2018 05:56 )  <.017 ng/mL / x     / x     / x     / x      CARDIAC MARKERS ( 17 Sep 2018 04:00 )  <.017 ng/mL / x     / x     / x     / x      CARDIAC MARKERS ( 16 Sep 2018 18:00 )  .019 ng/mL / x     / 128 U/L / x     / 15.6 ng/mL      ABG - ( 16 Sep 2018 22:50 )  pH, Arterial: 7.36  pH, Blood: x     /  pCO2: 52    /  pO2: 52    / HCO3: x     / Base Excess: x     /  SaO2: 84          RADIOLOGY & ADDITIONAL TESTS:  < from: Xray Chest 1 View AP/PA (09.16.18 @ 18:21) >  No acute findings    < end of copied text >      Care Discussed with Consultants/Other Providers: Dr. Ashby

## 2018-09-18 NOTE — DISCHARGE NOTE ADULT - SECONDARY DIAGNOSIS.
Chronic diastolic CHF (congestive heart failure) Coronary artery disease involving native coronary artery of native heart without angina pectoris Acute respiratory failure with hypoxia and hypercapnia Seizure disorder Pulmonary hypertension, moderate to severe Orthostasis

## 2018-09-18 NOTE — PROGRESS NOTE ADULT - PROBLEM SELECTOR PLAN 2
steroids (increase dose) for 5 days total course at this increased dose / nebs / PT / respiratory therapy / doxycycline day #3/7
steroids (increase dose) / nebs / PT / respiratory therapy / doxycycline day #2/7

## 2018-09-18 NOTE — PROGRESS NOTE ADULT - ASSESSMENT
83M presented with SOB and found to have COPD exacerbation . PT eval -  recommended home with home PT. Patient is stable for D/C home today 83M presented with SOB and found to have COPD exacerbation . PT eval -  recommended home with home PT. Patient is stable for D/C home today    TIME SPENT ON D/C 32 MINUTES

## 2018-09-23 ENCOUNTER — INPATIENT (INPATIENT)
Facility: HOSPITAL | Age: 83
LOS: 4 days | Discharge: NURSING, REHAB OR LONG-TERM CARE | DRG: 291 | End: 2018-09-28
Attending: HOSPITALIST | Admitting: INTERNAL MEDICINE
Payer: MEDICARE

## 2018-09-23 VITALS
DIASTOLIC BLOOD PRESSURE: 49 MMHG | RESPIRATION RATE: 20 BRPM | HEART RATE: 66 BPM | TEMPERATURE: 98 F | SYSTOLIC BLOOD PRESSURE: 101 MMHG | OXYGEN SATURATION: 100 %

## 2018-09-23 DIAGNOSIS — Z98.890 OTHER SPECIFIED POSTPROCEDURAL STATES: Chronic | ICD-10-CM

## 2018-09-23 DIAGNOSIS — N40.0 BENIGN PROSTATIC HYPERPLASIA WITHOUT LOWER URINARY TRACT SYMPTOMS: ICD-10-CM

## 2018-09-23 DIAGNOSIS — K91.89 OTHER POSTPROCEDURAL COMPLICATIONS AND DISORDERS OF DIGESTIVE SYSTEM: Chronic | ICD-10-CM

## 2018-09-23 DIAGNOSIS — Z96.649 PRESENCE OF UNSPECIFIED ARTIFICIAL HIP JOINT: Chronic | ICD-10-CM

## 2018-09-23 DIAGNOSIS — J44.9 CHRONIC OBSTRUCTIVE PULMONARY DISEASE, UNSPECIFIED: ICD-10-CM

## 2018-09-23 DIAGNOSIS — M12.9 ARTHROPATHY, UNSPECIFIED: Chronic | ICD-10-CM

## 2018-09-23 DIAGNOSIS — I25.10 ATHEROSCLEROTIC HEART DISEASE OF NATIVE CORONARY ARTERY WITHOUT ANGINA PECTORIS: ICD-10-CM

## 2018-09-23 DIAGNOSIS — Z90.89 ACQUIRED ABSENCE OF OTHER ORGANS: Chronic | ICD-10-CM

## 2018-09-23 DIAGNOSIS — I50.33 ACUTE ON CHRONIC DIASTOLIC (CONGESTIVE) HEART FAILURE: ICD-10-CM

## 2018-09-23 DIAGNOSIS — G40.909 EPILEPSY, UNSPECIFIED, NOT INTRACTABLE, WITHOUT STATUS EPILEPTICUS: ICD-10-CM

## 2018-09-23 DIAGNOSIS — M06.9 RHEUMATOID ARTHRITIS, UNSPECIFIED: ICD-10-CM

## 2018-09-23 DIAGNOSIS — Z95.5 PRESENCE OF CORONARY ANGIOPLASTY IMPLANT AND GRAFT: Chronic | ICD-10-CM

## 2018-09-23 DIAGNOSIS — E03.9 HYPOTHYROIDISM, UNSPECIFIED: ICD-10-CM

## 2018-09-23 LAB
ALBUMIN SERPL ELPH-MCNC: 2.5 G/DL — LOW (ref 3.3–5)
ALP SERPL-CCNC: 205 U/L — HIGH (ref 40–120)
ALT FLD-CCNC: 44 U/L DA — SIGNIFICANT CHANGE UP (ref 10–45)
ANION GAP SERPL CALC-SCNC: 5 MMOL/L — SIGNIFICANT CHANGE UP (ref 5–17)
APTT BLD: 34.6 SEC — SIGNIFICANT CHANGE UP (ref 27.5–37.4)
AST SERPL-CCNC: 34 U/L — SIGNIFICANT CHANGE UP (ref 10–40)
BILIRUB SERPL-MCNC: 0.1 MG/DL — LOW (ref 0.2–1.2)
BLOOD GAS COMMENTS ARTERIAL: SIGNIFICANT CHANGE UP
BUN SERPL-MCNC: 25 MG/DL — HIGH (ref 7–23)
CALCIUM SERPL-MCNC: 9.6 MG/DL — SIGNIFICANT CHANGE UP (ref 8.4–10.5)
CHLORIDE SERPL-SCNC: 105 MMOL/L — SIGNIFICANT CHANGE UP (ref 96–108)
CO2 BLDA-SCNC: 29 MMOL/L — SIGNIFICANT CHANGE UP (ref 22–30)
CO2 SERPL-SCNC: 31 MMOL/L — SIGNIFICANT CHANGE UP (ref 22–31)
CREAT SERPL-MCNC: 0.81 MG/DL — SIGNIFICANT CHANGE UP (ref 0.5–1.3)
GLUCOSE SERPL-MCNC: 82 MG/DL — SIGNIFICANT CHANGE UP (ref 70–99)
HCT VFR BLD CALC: 31.6 % — LOW (ref 39–50)
HGB BLD-MCNC: 10.1 G/DL — LOW (ref 13–17)
HOROWITZ INDEX BLDA+IHG-RTO: SIGNIFICANT CHANGE UP
INR BLD: 1.04 RATIO — SIGNIFICANT CHANGE UP (ref 0.88–1.16)
MCHC RBC-ENTMCNC: 31 PG — SIGNIFICANT CHANGE UP (ref 27–34)
MCHC RBC-ENTMCNC: 32 GM/DL — SIGNIFICANT CHANGE UP (ref 32–36)
MCV RBC AUTO: 96.8 FL — SIGNIFICANT CHANGE UP (ref 80–100)
NT-PROBNP SERPL-SCNC: 3981 PG/ML — HIGH (ref 0–300)
PCO2 BLDA: 48 MMHG — HIGH (ref 32–46)
PH BLDA: 7.37 — SIGNIFICANT CHANGE UP (ref 7.35–7.45)
PLATELET # BLD AUTO: 164 K/UL — SIGNIFICANT CHANGE UP (ref 150–400)
PO2 BLDA: 138 MMHG — HIGH (ref 74–108)
POTASSIUM SERPL-MCNC: 3.8 MMOL/L — SIGNIFICANT CHANGE UP (ref 3.5–5.3)
POTASSIUM SERPL-SCNC: 3.8 MMOL/L — SIGNIFICANT CHANGE UP (ref 3.5–5.3)
PROT SERPL-MCNC: 6.9 G/DL — SIGNIFICANT CHANGE UP (ref 6–8.3)
PROTHROM AB SERPL-ACNC: 11.6 SEC — SIGNIFICANT CHANGE UP (ref 9.8–12.7)
RBC # BLD: 3.26 M/UL — LOW (ref 4.2–5.8)
RBC # FLD: 16.2 % — HIGH (ref 10.3–14.5)
SAO2 % BLDA: 99 % — HIGH (ref 92–96)
SODIUM SERPL-SCNC: 141 MMOL/L — SIGNIFICANT CHANGE UP (ref 135–145)
TROPONIN I SERPL-MCNC: <.017 NG/ML — LOW (ref 0.02–0.06)
WBC # BLD: 5.4 K/UL — SIGNIFICANT CHANGE UP (ref 3.8–10.5)
WBC # FLD AUTO: 5.4 K/UL — SIGNIFICANT CHANGE UP (ref 3.8–10.5)

## 2018-09-23 PROCEDURE — 99285 EMERGENCY DEPT VISIT HI MDM: CPT

## 2018-09-23 PROCEDURE — 70450 CT HEAD/BRAIN W/O DYE: CPT | Mod: 26

## 2018-09-23 PROCEDURE — 93010 ELECTROCARDIOGRAM REPORT: CPT

## 2018-09-23 PROCEDURE — 99223 1ST HOSP IP/OBS HIGH 75: CPT

## 2018-09-23 PROCEDURE — 71045 X-RAY EXAM CHEST 1 VIEW: CPT | Mod: 26

## 2018-09-23 RX ORDER — PANTOPRAZOLE SODIUM 20 MG/1
40 TABLET, DELAYED RELEASE ORAL
Qty: 0 | Refills: 0 | Status: DISCONTINUED | OUTPATIENT
Start: 2018-09-23 | End: 2018-09-28

## 2018-09-23 RX ORDER — TIOTROPIUM BROMIDE 18 UG/1
1 CAPSULE ORAL; RESPIRATORY (INHALATION) DAILY
Qty: 0 | Refills: 0 | Status: COMPLETED | OUTPATIENT
Start: 2018-09-23 | End: 2019-01-01

## 2018-09-23 RX ORDER — TAMSULOSIN HYDROCHLORIDE 0.4 MG/1
0.4 CAPSULE ORAL AT BEDTIME
Qty: 0 | Refills: 0 | Status: DISCONTINUED | OUTPATIENT
Start: 2018-09-23 | End: 2018-09-28

## 2018-09-23 RX ORDER — DEXAMETHASONE 0.5 MG/5ML
10 ELIXIR ORAL ONCE
Qty: 0 | Refills: 0 | Status: COMPLETED | OUTPATIENT
Start: 2018-09-23 | End: 2018-09-23

## 2018-09-23 RX ORDER — FUROSEMIDE 40 MG
40 TABLET ORAL ONCE
Qty: 0 | Refills: 0 | Status: COMPLETED | OUTPATIENT
Start: 2018-09-23 | End: 2018-09-23

## 2018-09-23 RX ORDER — ATORVASTATIN CALCIUM 80 MG/1
20 TABLET, FILM COATED ORAL AT BEDTIME
Qty: 0 | Refills: 0 | Status: DISCONTINUED | OUTPATIENT
Start: 2018-09-23 | End: 2018-09-28

## 2018-09-23 RX ORDER — FUROSEMIDE 40 MG
40 TABLET ORAL DAILY
Qty: 0 | Refills: 0 | Status: DISCONTINUED | OUTPATIENT
Start: 2018-09-23 | End: 2018-09-24

## 2018-09-23 RX ORDER — CLOPIDOGREL BISULFATE 75 MG/1
75 TABLET, FILM COATED ORAL DAILY
Qty: 0 | Refills: 0 | Status: DISCONTINUED | OUTPATIENT
Start: 2018-09-23 | End: 2018-09-28

## 2018-09-23 RX ORDER — SIMVASTATIN 20 MG/1
0 TABLET, FILM COATED ORAL
Qty: 0 | Refills: 0 | COMMUNITY

## 2018-09-23 RX ORDER — SUCRALFATE 1 G
1 TABLET ORAL
Qty: 0 | Refills: 0 | COMMUNITY

## 2018-09-23 RX ORDER — ENOXAPARIN SODIUM 100 MG/ML
40 INJECTION SUBCUTANEOUS EVERY 24 HOURS
Qty: 0 | Refills: 0 | Status: DISCONTINUED | OUTPATIENT
Start: 2018-09-23 | End: 2018-09-28

## 2018-09-23 RX ORDER — IPRATROPIUM/ALBUTEROL SULFATE 18-103MCG
3 AEROSOL WITH ADAPTER (GRAM) INHALATION
Qty: 0 | Refills: 0 | Status: COMPLETED | OUTPATIENT
Start: 2018-09-23 | End: 2018-09-23

## 2018-09-23 RX ORDER — IPRATROPIUM/ALBUTEROL SULFATE 18-103MCG
3 AEROSOL WITH ADAPTER (GRAM) INHALATION EVERY 6 HOURS
Qty: 0 | Refills: 0 | Status: DISCONTINUED | OUTPATIENT
Start: 2018-09-23 | End: 2018-09-28

## 2018-09-23 RX ORDER — LEVOTHYROXINE SODIUM 125 MCG
50 TABLET ORAL DAILY
Qty: 0 | Refills: 0 | Status: DISCONTINUED | OUTPATIENT
Start: 2018-09-23 | End: 2018-09-28

## 2018-09-23 RX ORDER — MONTELUKAST 4 MG/1
10 TABLET, CHEWABLE ORAL DAILY
Qty: 0 | Refills: 0 | Status: DISCONTINUED | OUTPATIENT
Start: 2018-09-23 | End: 2018-09-28

## 2018-09-23 RX ORDER — MULTIVIT-MIN/FERROUS GLUCONATE 9 MG/15 ML
0 LIQUID (ML) ORAL
Qty: 0 | Refills: 0 | COMMUNITY

## 2018-09-23 RX ORDER — ALBUTEROL 90 UG/1
2 AEROSOL, METERED ORAL
Qty: 0 | Refills: 0 | COMMUNITY

## 2018-09-23 RX ORDER — ALPRAZOLAM 0.25 MG
0.25 TABLET ORAL
Qty: 0 | Refills: 0 | Status: DISCONTINUED | OUTPATIENT
Start: 2018-09-23 | End: 2018-09-28

## 2018-09-23 RX ORDER — SIMVASTATIN 20 MG/1
40 TABLET, FILM COATED ORAL AT BEDTIME
Qty: 0 | Refills: 0 | Status: DISCONTINUED | OUTPATIENT
Start: 2018-09-23 | End: 2018-09-23

## 2018-09-23 RX ORDER — AMITRIPTYLINE HCL 25 MG
10 TABLET ORAL THREE TIMES A DAY
Qty: 0 | Refills: 0 | Status: DISCONTINUED | OUTPATIENT
Start: 2018-09-23 | End: 2018-09-26

## 2018-09-23 RX ORDER — LEVOTHYROXINE SODIUM 125 MCG
0 TABLET ORAL
Qty: 0 | Refills: 0 | COMMUNITY

## 2018-09-23 RX ORDER — FOLIC ACID 0.8 MG
1 TABLET ORAL DAILY
Qty: 0 | Refills: 0 | Status: DISCONTINUED | OUTPATIENT
Start: 2018-09-23 | End: 2018-09-28

## 2018-09-23 RX ORDER — FINASTERIDE 5 MG/1
5 TABLET, FILM COATED ORAL DAILY
Qty: 0 | Refills: 0 | Status: DISCONTINUED | OUTPATIENT
Start: 2018-09-23 | End: 2018-09-28

## 2018-09-23 RX ADMIN — Medication 10 MILLIGRAM(S): at 18:03

## 2018-09-23 RX ADMIN — Medication 102 MILLIGRAM(S): at 17:33

## 2018-09-23 RX ADMIN — Medication 3 MILLILITER(S): at 17:55

## 2018-09-23 RX ADMIN — ENOXAPARIN SODIUM 40 MILLIGRAM(S): 100 INJECTION SUBCUTANEOUS at 22:52

## 2018-09-23 RX ADMIN — Medication 3 MILLILITER(S): at 17:42

## 2018-09-23 RX ADMIN — MONTELUKAST 10 MILLIGRAM(S): 4 TABLET, CHEWABLE ORAL at 23:11

## 2018-09-23 RX ADMIN — ATORVASTATIN CALCIUM 20 MILLIGRAM(S): 80 TABLET, FILM COATED ORAL at 22:54

## 2018-09-23 RX ADMIN — Medication 3 MILLILITER(S): at 17:30

## 2018-09-23 RX ADMIN — Medication 3 MILLILITER(S): at 17:38

## 2018-09-23 RX ADMIN — Medication 40 MILLIGRAM(S): at 21:38

## 2018-09-23 NOTE — ED ADULT NURSE NOTE - CHPI ED NUR SYMPTOMS NEG
no bleeding/no numbness/no fever/no confusion/no deformity/no abrasion/no tingling/no loss of consciousness/no vomiting

## 2018-09-23 NOTE — PATIENT PROFILE ADULT. - ABILITY TO HEAR (WITH HEARING AID OR HEARING APPLIANCE IF NORMALLY USED):
pt wears b/l hearing aid/Mildly to Moderately Impaired: difficulty hearing in some environments or speaker may need to increase volume or speak distinctly

## 2018-09-23 NOTE — ED PROVIDER NOTE - PHYSICAL EXAMINATION
General:     NAD, well-nourished, well-appearing  Head:     NC L periorbital ecchymosis, EOMI, oral mucosa moist  Neck:     trachea midline  Lungs:    + wheezing bilaterally, decreased air entry bilaterally   CVS:     S1S2, RRR, no m/g/r  Abd:     +BS, s/nt/nd, no organomegaly  Ext:    2+ radial and pedal pulses, no c/c/e  Neuro: AAOx3, no sensory/motor deficits

## 2018-09-23 NOTE — ED ADULT NURSE REASSESSMENT NOTE - NS ED NURSE REASSESS COMMENT FT1
Pt admitted, bed available, as per Hospitalist MD John MD wants to see and evaluate pt prior to transferring pt upstairs. No s/s of distress noted

## 2018-09-23 NOTE — H&P ADULT - NSHPREVIEWOFSYSTEMS_GEN_ALL_CORE
CONSTITUTIONAL: No fever, weight loss, ++ fatigue  EYES: No eye pain, visual disturbances, or discharge  ENMT:  No difficulty hearing, tinnitus, vertigo; No sinus or throat pain  NECK: No pain or stiffness  RESPIRATORY: No cough, wheezing, chills or hemoptysis; ++++ shortness of breath  CARDIOVASCULAR: No chest pain, palpitations, dizziness, ++chronic R >L LE swelling which is unchanged  GASTROINTESTINAL: No abdominal or epigastric pain. No nausea, vomiting, or hematemesis; No diarrhea or constipation. No melena or hematochezia.  GENITOURINARY: No dysuria, frequency, hematuria, or incontinence  NEUROLOGICAL: No headaches, memory loss, loss of strength, numbness, or tremors  SKIN: No itching, burning, rashes, or lesions   LYMPH NODES: No enlarged glands  ENDOCRINE: No heat or cold intolerance; No hair loss  MUSCULOSKELETAL: No joint pain or swelling; No muscle, back, or extremity pain  PSYCHIATRIC: No depression, anxiety, mood swings, or difficulty sleeping  HEME/LYMPH: No easy bruising, or bleeding gums  ALLERY AND IMMUNOLOGIC: No hives or eczema    IMPROVE VTE Individual Risk Assessment          RISK                                                          Points  [  ] Previous VTE                                                3  [  ] Thrombophilia                                             2  [  ] Lower limb paralysis                                   2        (unable to hold up >15 seconds)    [  ] Current Cancer                                             2         (within 6 months)  [  ] Immobilization > 24 hrs                              1  [  ] ICU/CCU stay > 24 hours                             1  [x  ] Age > 60                                                         1    IMPROVE VTE Score:         [ 1        ]    Total Risk Factor Score:    0 - 1:   Consider IPC  >2 - 3:  Thromboprophylaxis required (enoxaparin or SQ heparin)        >4:   High Risk: Thromboprophylaxis required (enoxaparin or SQ heparin), optional add IPC  **If CONTRAINDICATION to enoxaparin or SQ heparin, USE IPCs**

## 2018-09-23 NOTE — H&P ADULT - HISTORY OF PRESENT ILLNESS
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 s/p recent D/C from  9/18/18 for COPD exacerbation. Since D/C home, pt has been more fatigue, difficulty ambulating sec to generalized weakness, s/p fall 2 days ago with bruise over L eye and L face (but did not go to ED), today more lethargic than usual and had gradual increase SOB, s/p another fall today after leaning over to  a napkin but no injuries but subsequently developed severe acute SOB, no wheezing, no assoc dizziness, palps, nausea, chest pain or diaphoresis. EMS arrived and pt placed on CPAP with immediate relief.   states COPD exac usually assoc with wheezing but not this episode

## 2018-09-23 NOTE — ED ADULT NURSE NOTE - NSIMPLEMENTINTERV_GEN_ALL_ED
Implemented All Fall Risk Interventions:  [place] to call system. Call bell, personal items and telephone within reach. Instruct patient to call for assistance. Room bathroom lighting operational. Non-slip footwear when patient is off stretcher. Physically safe environment: no spills, clutter or unnecessary equipment. Stretcher in lowest position, wheels locked, appropriate side rails in place. Provide visual cue, wrist band, yellow gown, etc. Monitor gait and stability. Monitor for mental status changes and reorient to person, place, and time. Review medications for side effects contributing to fall risk. Reinforce activity limits and safety measures with patient and family.

## 2018-09-23 NOTE — H&P ADULT - NSHPLABSRESULTS_GEN_ALL_CORE
10.1   5.4   )-----------( 164      ( 23 Sep 2018 17:00 )             31.6       09-23    141  |  105  |  25<H>  ----------------------------<  82  3.8   |  31  |  0.81    Ca    9.6      23 Sep 2018 17:00    TPro  6.9  /  Alb  2.5<L>  /  TBili  0.1<L>  /  DBili  x   /  AST  34  /  ALT  44  /  AlkPhos  205<H>  09-23         LIVER FUNCTIONS - ( 23 Sep 2018 17:00 )  Alb: 2.5 g/dL / Pro: 6.9 g/dL / ALK PHOS: 205 U/L / ALT: 44 U/L DA / AST: 34 U/L / GGT: x               PT/INR - ( 23 Sep 2018 17:00 )   PT: 11.6 sec;   INR: 1.04 ratio         PTT - ( 23 Sep 2018 17:00 )  PTT:34.6 sec    CARDIAC MARKERS ( 23 Sep 2018 17:00 )  <.017 ng/mL / x     / x     / x     / x          Serum Pro-Brain Natriuretic Peptide: 3981 pg/mL (09-23-18 @ 17:00)        EKG:      < from: CT Head No Cont (09.23.18 @ 19:19) >    IMPRESSION: No intracranial hemorrhage or mass effect. Stable chronic   infarcts.    < end of copied text >    < from: Xray Chest 1 View AP/PA (09.23.18 @ 17:18) >    IMPRESSION:  Mild pulmonary venous congestion. Probable small right pleural effusion.    < end of copied text >

## 2018-09-23 NOTE — H&P ADULT - PROBLEM SELECTOR PLAN 1
IV diuresis, trend trops, cardiology consult.   last ECHO 9/17/18 with EF 65%, mod to severe AS, mod PHTN  daily weights, I &O  continuous pulse ox overnight - s/p CPAP

## 2018-09-23 NOTE — ED ADULT NURSE NOTE - PMH
Anxiety    Asthma    BCC (basal cell carcinoma)  skin  BPH (benign prostatic hyperplasia)    Cerebrovascular accident (CVA)    Chronic allergic rhinitis    Coronary artery disease  s/p 3 stents on June 30, 2017 at [place] Dr. Lavelle Reid  Esophagus cancer    Former smoker, stopped smoking in distant past    HTN (hypertension)    Hyperlipidemia    OA (osteoarthritis) of knee  right  Rheumatoid arthritis    Seizure disorder  1 episode approximately 15 yrs ago

## 2018-09-23 NOTE — ED PROVIDER NOTE - PMH
Anxiety    Asthma    BCC (basal cell carcinoma)  skin  BPH (benign prostatic hyperplasia)    Cerebrovascular accident (CVA)    Chronic allergic rhinitis    Coronary artery disease  s/p 3 stents on June 30, 2017 at [place] Dr. Lavelle Reid  Esophagus cancer    Former smoker, stopped smoking in distant past    HTN (hypertension)    Hyperlipidemia    OA (osteoarthritis) of knee  right  Rheumatoid arthritis    Seizure disorder  1 episode approximately 15 yrs ago Anxiety    Asthma    BCC (basal cell carcinoma)  skin  BPH (benign prostatic hyperplasia)    Cerebrovascular accident (CVA)    Chronic allergic rhinitis    Coronary artery disease  s/p 3 stents on June 30, 2017 at [place] Dr. Lavelle Reid  Esophagus cancer    Former smoker, stopped smoking in distant past    HTN (hypertension)    Hyperlipidemia    OA (osteoarthritis) of knee  right  Rheumatoid arthritis    Seizure disorder  1 episode approximately 15 yrs ago

## 2018-09-23 NOTE — H&P ADULT - ASSESSMENT
83M hx of CAD s/p stent 6/2017, CVA/TIA with no residual deficits, sz d/o, RA on Pred 5mg maintenance,  COPD, diastolic CHF, hx of esophageal ca s/p chemo/RT and esophagectomy 2017 complicated with strictures requiring dilatation, last about 6 weeks ago. s/p recent D/C from  9/18/18 for COPD exacerbation now with CHF exacerbation

## 2018-09-23 NOTE — ED PROVIDER NOTE - OBJECTIVE STATEMENT
83 y m cc sob wheezing sob was placed on c pap by ems due to respiratory distress, improved in ed spo2 95 % with oxygen

## 2018-09-23 NOTE — H&P ADULT - PMH
Anxiety    Asthma    BCC (basal cell carcinoma)  skin  BPH (benign prostatic hyperplasia)    Cerebrovascular accident (CVA)    Chronic allergic rhinitis    Coronary artery disease  s/p 3 stents on June 30, 2017 at [place] Dr. Lavelle Reid  Esophagus cancer    Former smoker, stopped smoking in distant past    HTN (hypertension)    Hyperlipidemia    OA (osteoarthritis) of knee  right  Rheumatoid arthritis    Seizure disorder  1 episode approximately 15 yrs ago

## 2018-09-23 NOTE — ED PROVIDER NOTE - CARE PLAN
Principal Discharge DX:	COPD exacerbation  Secondary Diagnosis:	CHI (closed head injury), initial encounter

## 2018-09-23 NOTE — H&P ADULT - NSHPPHYSICALEXAM_GEN_ALL_CORE
Vital Signs Last 24 Hrs  T(C): 36.6 (23 Sep 2018 17:00), Max: 36.6 (23 Sep 2018 17:00)  T(F): 97.8 (23 Sep 2018 17:00), Max: 97.8 (23 Sep 2018 17:00)  HR: 69 (23 Sep 2018 17:36) (66 - 69)  BP: 112/56 (23 Sep 2018 17:36) (101/49 - 112/56)  BP(mean): --  RR: 28 (23 Sep 2018 17:36) (20 - 28)  SpO2: 97% (23 Sep 2018 17:36) (97% - 100%)  Daily Height in cm: 160.02 (23 Sep 2018 17:36)    Daily   CAPILLARY BLOOD GLUCOSE        GENERAL: NAD  HEAD: L eye and L maxillary bruise  EYES: EOMI, PERRLA, conjunctiva and sclera clear  ENMT: No tonsillar erythema, exudates, or enlargement; Moist mucous membranes, No lesions  NECK: Supple, + JVD, no bruit, normal thyroid  NERVOUS SYSTEM:  Alert & Oriented X3, Good concentration; grossly  Motor Strength 5/5 B/L upper and lower extremities; DTRs 2+ intact and symmetric  CHEST/LUNG: bilateral rales to mid lungs, no rhonchi, no exp wheezing, or rubs  HEART: Regular rate and rhythm; syst murmurs, no rubs, or gallops  ABDOMEN: Soft, Nontender, Nondistended; Bowel sounds present  EXTREMITIES:  2+ Peripheral Pulses, No clubbing, cyanosis, + R leg 1+edema >L  LYMPH: No lymphadenopathy noted  SKIN: No rashes or lesions

## 2018-09-24 DIAGNOSIS — I50.32 CHRONIC DIASTOLIC (CONGESTIVE) HEART FAILURE: ICD-10-CM

## 2018-09-24 DIAGNOSIS — J44.9 CHRONIC OBSTRUCTIVE PULMONARY DISEASE, UNSPECIFIED: ICD-10-CM
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
## 2018-09-24 DIAGNOSIS — R06.02 SHORTNESS OF BREATH: ICD-10-CM

## 2018-09-24 DIAGNOSIS — E03.9 HYPOTHYROIDISM, UNSPECIFIED: ICD-10-CM

## 2018-09-24 DIAGNOSIS — M06.9 RHEUMATOID ARTHRITIS, UNSPECIFIED: ICD-10-CM

## 2018-09-24 DIAGNOSIS — E78.5 HYPERLIPIDEMIA, UNSPECIFIED: ICD-10-CM

## 2018-09-24 DIAGNOSIS — M25.512 PAIN IN LEFT SHOULDER: ICD-10-CM

## 2018-09-24 DIAGNOSIS — I10 ESSENTIAL (PRIMARY) HYPERTENSION: ICD-10-CM

## 2018-09-24 LAB
ALBUMIN SERPL ELPH-MCNC: 2.4 G/DL — LOW (ref 3.3–5)
ALP SERPL-CCNC: 195 U/L — HIGH (ref 40–120)
ALT FLD-CCNC: 39 U/L DA — SIGNIFICANT CHANGE UP (ref 10–45)
ANION GAP SERPL CALC-SCNC: 4 MMOL/L — LOW (ref 5–17)
AST SERPL-CCNC: 27 U/L — SIGNIFICANT CHANGE UP (ref 10–40)
BILIRUB SERPL-MCNC: 0.1 MG/DL — LOW (ref 0.2–1.2)
BUN SERPL-MCNC: 24 MG/DL — HIGH (ref 7–23)
CALCIUM SERPL-MCNC: 9.5 MG/DL — SIGNIFICANT CHANGE UP (ref 8.4–10.5)
CHLORIDE SERPL-SCNC: 103 MMOL/L — SIGNIFICANT CHANGE UP (ref 96–108)
CO2 SERPL-SCNC: 33 MMOL/L — HIGH (ref 22–31)
CREAT SERPL-MCNC: 0.74 MG/DL — SIGNIFICANT CHANGE UP (ref 0.5–1.3)
GLUCOSE SERPL-MCNC: 81 MG/DL — SIGNIFICANT CHANGE UP (ref 70–99)
HCT VFR BLD CALC: 31.1 % — LOW (ref 39–50)
HGB BLD-MCNC: 9.5 G/DL — LOW (ref 13–17)
MAGNESIUM SERPL-MCNC: 1.7 MG/DL — SIGNIFICANT CHANGE UP (ref 1.6–2.6)
MCHC RBC-ENTMCNC: 29.5 PG — SIGNIFICANT CHANGE UP (ref 27–34)
MCHC RBC-ENTMCNC: 30.5 GM/DL — LOW (ref 32–36)
MCV RBC AUTO: 96.8 FL — SIGNIFICANT CHANGE UP (ref 80–100)
PLATELET # BLD AUTO: 158 K/UL — SIGNIFICANT CHANGE UP (ref 150–400)
POTASSIUM SERPL-MCNC: 3.8 MMOL/L — SIGNIFICANT CHANGE UP (ref 3.5–5.3)
POTASSIUM SERPL-SCNC: 3.8 MMOL/L — SIGNIFICANT CHANGE UP (ref 3.5–5.3)
PROT SERPL-MCNC: 6.7 G/DL — SIGNIFICANT CHANGE UP (ref 6–8.3)
RBC # BLD: 3.21 M/UL — LOW (ref 4.2–5.8)
RBC # FLD: 16.2 % — HIGH (ref 10.3–14.5)
SODIUM SERPL-SCNC: 140 MMOL/L — SIGNIFICANT CHANGE UP (ref 135–145)
TROPONIN I SERPL-MCNC: <.017 NG/ML — LOW (ref 0.02–0.06)
TROPONIN I SERPL-MCNC: <.017 NG/ML — LOW (ref 0.02–0.06)
TSH SERPL-MCNC: 3.99 UIU/ML — SIGNIFICANT CHANGE UP (ref 0.27–4.2)
WBC # BLD: 4.8 K/UL — SIGNIFICANT CHANGE UP (ref 3.8–10.5)
WBC # FLD AUTO: 4.8 K/UL — SIGNIFICANT CHANGE UP (ref 3.8–10.5)

## 2018-09-24 PROCEDURE — 99223 1ST HOSP IP/OBS HIGH 75: CPT

## 2018-09-24 PROCEDURE — 99222 1ST HOSP IP/OBS MODERATE 55: CPT

## 2018-09-24 PROCEDURE — 99233 SBSQ HOSP IP/OBS HIGH 50: CPT | Mod: GC

## 2018-09-24 RX ORDER — ACETAMINOPHEN 500 MG
650 TABLET ORAL EVERY 6 HOURS
Qty: 0 | Refills: 0 | Status: DISCONTINUED | OUTPATIENT
Start: 2018-09-24 | End: 2018-09-24

## 2018-09-24 RX ORDER — ACETAMINOPHEN 500 MG
650 TABLET ORAL EVERY 6 HOURS
Qty: 0 | Refills: 0 | Status: DISCONTINUED | OUTPATIENT
Start: 2018-09-24 | End: 2018-09-28

## 2018-09-24 RX ORDER — FUROSEMIDE 40 MG
40 TABLET ORAL DAILY
Qty: 0 | Refills: 0 | Status: DISCONTINUED | OUTPATIENT
Start: 2018-09-25 | End: 2018-09-28

## 2018-09-24 RX ADMIN — MONTELUKAST 10 MILLIGRAM(S): 4 TABLET, CHEWABLE ORAL at 11:13

## 2018-09-24 RX ADMIN — Medication 650 MILLIGRAM(S): at 17:59

## 2018-09-24 RX ADMIN — Medication 100 MILLIGRAM(S): at 06:05

## 2018-09-24 RX ADMIN — Medication 50 MICROGRAM(S): at 06:05

## 2018-09-24 RX ADMIN — Medication 100 MILLIGRAM(S): at 11:13

## 2018-09-24 RX ADMIN — Medication 3 MILLILITER(S): at 15:58

## 2018-09-24 RX ADMIN — Medication 650 MILLIGRAM(S): at 17:28

## 2018-09-24 RX ADMIN — TAMSULOSIN HYDROCHLORIDE 0.4 MILLIGRAM(S): 0.4 CAPSULE ORAL at 23:29

## 2018-09-24 RX ADMIN — Medication 100 MILLIGRAM(S): at 17:28

## 2018-09-24 RX ADMIN — Medication 10 MILLIGRAM(S): at 23:30

## 2018-09-24 RX ADMIN — Medication 5 MILLIGRAM(S): at 06:05

## 2018-09-24 RX ADMIN — ATORVASTATIN CALCIUM 20 MILLIGRAM(S): 80 TABLET, FILM COATED ORAL at 23:30

## 2018-09-24 RX ADMIN — Medication 3 MILLILITER(S): at 03:52

## 2018-09-24 RX ADMIN — Medication 100 MILLIGRAM(S): at 23:43

## 2018-09-24 RX ADMIN — Medication 3 MILLILITER(S): at 10:25

## 2018-09-24 RX ADMIN — PANTOPRAZOLE SODIUM 40 MILLIGRAM(S): 20 TABLET, DELAYED RELEASE ORAL at 06:05

## 2018-09-24 RX ADMIN — Medication 1 MILLIGRAM(S): at 11:13

## 2018-09-24 RX ADMIN — CLOPIDOGREL BISULFATE 75 MILLIGRAM(S): 75 TABLET, FILM COATED ORAL at 11:12

## 2018-09-24 RX ADMIN — Medication 10 MILLIGRAM(S): at 13:11

## 2018-09-24 RX ADMIN — ENOXAPARIN SODIUM 40 MILLIGRAM(S): 100 INJECTION SUBCUTANEOUS at 23:30

## 2018-09-24 RX ADMIN — Medication 100 MILLIGRAM(S): at 00:26

## 2018-09-24 RX ADMIN — FINASTERIDE 5 MILLIGRAM(S): 5 TABLET, FILM COATED ORAL at 11:13

## 2018-09-24 NOTE — PROGRESS NOTE ADULT - SUBJECTIVE AND OBJECTIVE BOX
Patient is a 83y old  Male who presents with a chief complaint of fell out of bed (24 Sep 2018 10:38)    Patient seen and examined at bedside.  S: Sts SOB is improved since admission, minimal at present. No cp/palpitations. No f/c.     ALLERGIES:  penicillin (Rash)    MEDICATIONS:  acetaminophen   Tablet .. 650 milliGRAM(s) Oral every 6 hours PRN  ALBUTerol/ipratropium for Nebulization 3 milliLiter(s) Nebulizer every 6 hours  ALPRAZolam 0.25 milliGRAM(s) Oral four times a day PRN  amitriptyline 10 milliGRAM(s) Oral three times a day  atorvastatin 20 milliGRAM(s) Oral at bedtime  clopidogrel Tablet 75 milliGRAM(s) Oral daily  enoxaparin Injectable 40 milliGRAM(s) SubCutaneous every 24 hours  finasteride 5 milliGRAM(s) Oral daily  folic acid 1 milliGRAM(s) Oral daily  furosemide   Injectable 40 milliGRAM(s) IV Push daily  levothyroxine 50 MICROGram(s) Oral daily  montelukast 10 milliGRAM(s) Oral daily  pantoprazole    Tablet 40 milliGRAM(s) Oral before breakfast  phenytoin   Capsule 100 milliGRAM(s) Oral every 6 hours  predniSONE   Tablet 5 milliGRAM(s) Oral daily  tamsulosin 0.4 milliGRAM(s) Oral at bedtime  tiotropium 18 MICROgram(s) Capsule 1 Capsule(s) Inhalation daily    Vital Signs Last 24 Hrs  T(F): 97.9 (24 Sep 2018 13:51), Max: 97.9 (24 Sep 2018 05:51)  HR: 58 (24 Sep 2018 13:51) (58 - 88)  BP: 96/62 (24 Sep 2018 13:51) (93/44 - 112/56)  RR: 16 (24 Sep 2018 13:51) (15 - 28)  SpO2: 100% (24 Sep 2018 13:51) (95% - 100%)  I&O's Summary    23 Sep 2018 07:01  -  24 Sep 2018 07:00  --------------------------------------------------------  IN: 0 mL / OUT: 800 mL / NET: -800 mL    24 Sep 2018 07:01  -  24 Sep 2018 14:07  --------------------------------------------------------  IN: 440 mL / OUT: 200 mL / NET: 240 mL      PHYSICAL EXAM:  General: NAD, A/O x 3  HENT: L eye- periorbital/facial ecchymosis. JOYCE.   Lungs: Clear to auscultation bilaterally (Anteriorly)- Limited exam 2/2 pt. positioning in chair.   Cardio: RR, S1/S2, +murmur  Abdomen: Soft, NT/ND, Normal active Bowel Sounds   Extremities: No cyanosis, No edema. Decreased ROM to LUE 2/2 pain. No gross deformity/ecchymosis noted. +Radial pulse.     LABS:                        9.5    4.8   )-----------( 158      ( 24 Sep 2018 07:10 )             31.1     09-24    140  |  103  |  24  ----------------------------<  81  3.8   |  33  |  0.74    Ca    9.5      24 Sep 2018 07:10  Mg     1.7     09-24    TPro  6.7  /  Alb  2.4  /  TBili  0.1  /  DBili  x   /  AST  27  /  ALT  39  /  AlkPhos  195  09-24    eGFR if Non African American: 85 mL/min/1.73M2 (09-24-18 @ 07:10)  eGFR if : 99 mL/min/1.73M2 (09-24-18 @ 07:10)    PT/INR - ( 23 Sep 2018 17:00 )   PT: 11.6 sec;   INR: 1.04 ratio         PTT - ( 23 Sep 2018 17:00 )  PTT:34.6 sec    CARDIAC MARKERS ( 24 Sep 2018 07:10 )  <.017 ng/mL / x     / x     / x     / x      CARDIAC MARKERS ( 23 Sep 2018 23:15 )  <.017 ng/mL / x     / x     / x     / x      CARDIAC MARKERS ( 23 Sep 2018 17:00 )  <.017 ng/mL / x     / x     / x     / x          TSH 3.99   TSH with FT4 reflex --  Total T3 --    ABG - ( 23 Sep 2018 17:00 )  pH, Arterial: 7.37  pH, Blood: x     /  pCO2: 48    /  pO2: 138   / HCO3: x     / Base Excess: x     /  SaO2: 99          RADIOLOGY & ADDITIONAL TESTS:    < from: CT Head No Cont (09.23.18 @ 19:19) >  No intracranial hemorrhage or mass effect. Stable chronic   infarcts.    < from: Xray Chest 1 View AP/PA (09.23.18 @ 17:18) >  Mild pulmonary venous congestion. Probable small right pleural effusion.      Care Discussed with Consultants/Other Providers: Dr. Osullivan. Patient is a 83y old  Male who presents with a chief complaint of fell out of bed (24 Sep 2018 10:38)    Patient seen and examined at bedside.  S: Sts SOB is improved since admission, minimal at present. No cp/palpitations. No f/c.   Does endorse on my exam pain to LUE w. movement.     ALLERGIES:  penicillin (Rash)    MEDICATIONS:  acetaminophen   Tablet .. 650 milliGRAM(s) Oral every 6 hours PRN  ALBUTerol/ipratropium for Nebulization 3 milliLiter(s) Nebulizer every 6 hours  ALPRAZolam 0.25 milliGRAM(s) Oral four times a day PRN  amitriptyline 10 milliGRAM(s) Oral three times a day  atorvastatin 20 milliGRAM(s) Oral at bedtime  clopidogrel Tablet 75 milliGRAM(s) Oral daily  enoxaparin Injectable 40 milliGRAM(s) SubCutaneous every 24 hours  finasteride 5 milliGRAM(s) Oral daily  folic acid 1 milliGRAM(s) Oral daily  furosemide   Injectable 40 milliGRAM(s) IV Push daily  levothyroxine 50 MICROGram(s) Oral daily  montelukast 10 milliGRAM(s) Oral daily  pantoprazole    Tablet 40 milliGRAM(s) Oral before breakfast  phenytoin   Capsule 100 milliGRAM(s) Oral every 6 hours  predniSONE   Tablet 5 milliGRAM(s) Oral daily  tamsulosin 0.4 milliGRAM(s) Oral at bedtime  tiotropium 18 MICROgram(s) Capsule 1 Capsule(s) Inhalation daily    Vital Signs Last 24 Hrs  T(F): 97.9 (24 Sep 2018 13:51), Max: 97.9 (24 Sep 2018 05:51)  HR: 58 (24 Sep 2018 13:51) (58 - 88)  BP: 96/62 (24 Sep 2018 13:51) (93/44 - 112/56)  RR: 16 (24 Sep 2018 13:51) (15 - 28)  SpO2: 100% (24 Sep 2018 13:51) (95% - 100%)  I&O's Summary    23 Sep 2018 07:01  -  24 Sep 2018 07:00  --------------------------------------------------------  IN: 0 mL / OUT: 800 mL / NET: -800 mL    24 Sep 2018 07:01  -  24 Sep 2018 14:07  --------------------------------------------------------  IN: 440 mL / OUT: 200 mL / NET: 240 mL      PHYSICAL EXAM:  General: NAD, A/O x 3  HENT: L eye- periorbital/facial ecchymosis. JOYCE.   Lungs: Clear to auscultation bilaterally (Anteriorly), No wheeze.- Limited exam 2/2 pt. positioning in chair.   Cardio: RR, S1/S2, +murmur  Abdomen: Soft, NT/ND, Normal active Bowel Sounds   Extremities: No cyanosis, No edema. Decreased ROM to LUE 2/2 pain; unable to lift, No gross deformity/ecchymosis noted. +Radial pulse. No point tenderness, +FROM to Elbow & Wrist.     LABS:                        9.5    4.8   )-----------( 158      ( 24 Sep 2018 07:10 )             31.1     09-24    140  |  103  |  24  ----------------------------<  81  3.8   |  33  |  0.74    Ca    9.5      24 Sep 2018 07:10  Mg     1.7     09-24    TPro  6.7  /  Alb  2.4  /  TBili  0.1  /  DBili  x   /  AST  27  /  ALT  39  /  AlkPhos  195  09-24    eGFR if Non African American: 85 mL/min/1.73M2 (09-24-18 @ 07:10)  eGFR if : 99 mL/min/1.73M2 (09-24-18 @ 07:10)    PT/INR - ( 23 Sep 2018 17:00 )   PT: 11.6 sec;   INR: 1.04 ratio         PTT - ( 23 Sep 2018 17:00 )  PTT:34.6 sec    CARDIAC MARKERS ( 24 Sep 2018 07:10 )  <.017 ng/mL / x     / x     / x     / x      CARDIAC MARKERS ( 23 Sep 2018 23:15 )  <.017 ng/mL / x     / x     / x     / x      CARDIAC MARKERS ( 23 Sep 2018 17:00 )  <.017 ng/mL / x     / x     / x     / x          TSH 3.99   TSH with FT4 reflex --  Total T3 --    ABG - ( 23 Sep 2018 17:00 )  pH, Arterial: 7.37  pH, Blood: x     /  pCO2: 48    /  pO2: 138   / HCO3: x     / Base Excess: x     /  SaO2: 99          RADIOLOGY & ADDITIONAL TESTS:    < from: CT Head No Cont (09.23.18 @ 19:19) >  No intracranial hemorrhage or mass effect. Stable chronic   infarcts.    < from: Xray Chest 1 View AP/PA (09.23.18 @ 17:18) >  Mild pulmonary venous congestion. Probable small right pleural effusion.      Care Discussed with Consultants/Other Providers: Dr. Osullivan.

## 2018-09-24 NOTE — PROGRESS NOTE ADULT - PROBLEM SELECTOR PLAN 2
Cont home Meds  Palliative/Pulm Consult. Pt. is DNR (Wife to bring in MOLST form tomorrow)  Pending CT Chest Cont home Meds  Palliative/Pulm Consult. Pt. is DNR (Wife to bring in MOLST form tomorrow)

## 2018-09-24 NOTE — DIETITIAN INITIAL EVALUATION ADULT. - OTHER INFO
Patient reports some issues swallowing certain foods, patient does not appear receptive at present to diet consistency down grade

## 2018-09-24 NOTE — CONSULT NOTE ADULT - SUBJECTIVE AND OBJECTIVE BOX
HPI:HPI:  83M hx of CAD s/p stent 6/2017, CVA/TIA with no residual deficits, sz d/o, RA on Pred 5mg maintenance,  COPD, diastolic CHF, hx of esophageal ca s/p chemo/RT and esophagectomy complicated with strictures requiring dilatation, last about 6 weeks ago. s/p recent D/C from  9/18/18 for COPD exacerbation. Since D/C home, pt has been more fatigue, difficulty ambulating sec to generalized weakness, s/p fall 2 days ago with bruise over L eye and L face (but did not go to ED), today more lethargic than usual and had gradual increase SOB, s/p another fall today after leaning over to  a napkin but no injuries but subsequently developed severe acute SOB, no wheezing, no assoc dizziness, palps, nausea, chest pain or diaphoresis. EMS arrived and pt placed on CPAP with immediate relief.   states COPD exac usually assoc with wheezing but not this episode (23 Sep 2018 21:20)      PAST MEDICAL & SURGICAL HISTORY:  Coronary artery disease: s/p 3 stents on June 30, 2017 at [place] Dr. Lavelle Reid  OA (osteoarthritis) of knee: right  Former smoker, stopped smoking in distant past  Rheumatoid arthritis  Seizure disorder: 1 episode approximately 15 yrs ago  Asthma  Hyperlipidemia  BPH (benign prostatic hyperplasia)  HTN (hypertension)  Esophagus cancer  Chronic allergic rhinitis  BCC (basal cell carcinoma): skin  Cerebrovascular accident (CVA)  Anxiety  Anastomotic leak following esophagectomy: [place] [facility] esophagectomy  History of tonsillectomy  H/O heart artery stent: June 30, 2017  Knee arthropathy: left  History of total hip replacement: left  History of hernia repair      FAMILY HISTORY:  Family history of heart attack  Family history of pulmonary embolism: father @ 49 yr old  FH: CAD (coronary artery disease) (Sibling)      SOCIAL HISTORY:  Smoking: former      Allergies    penicillin (Rash)    Intolerances        HOME  MEDICATIONS:Home Medications:  ALPRAZolam 0.25 mg oral tablet: 1 tab(s) orally 4 times a day, As Needed (23 Sep 2018 19:55)  amitriptyline 10 mg oral tablet: 1 tab(s) orally 3 times a day (18 Sep 2018 10:49)  clopidogrel 75 mg oral tablet: 1 tab(s) orally once a day (31 Aug 2018 13:39)  ezetimibe 10 mg oral tablet: 1 tab(s) orally once a day (27 Jun 2018 20:38)  finasteride 5 mg oral tablet: 1 tab(s) orally once a day (23 Sep 2018 19:55)  folic acid: 400 microgram(s) orally (27 Jun 2018 20:38)  levothyroxine: 50 microgram(s) orally 6 times a week (23 Sep 2018 19:55)  montelukast 10 mg oral tablet: 1 tab(s) orally once a day (27 Jun 2018 20:38)  pantoprazole 40 mg oral delayed release tablet: 1 tab(s) orally once a day (23 Sep 2018 19:55)  phenytoin 100 mg oral capsule, extended release: 1 cap(s) orally every 6 hours (18 Sep 2018 10:49)  predniSONE: 5 mg orally once a day    Start after you complete the course of the higher dose of predisone (18 Sep 2018 10:49)  ProAir HFA 90 mcg/inh inhalation aerosol: 2 puff(s) inhaled 4 times a day (23 Sep 2018 19:55)  simvastatin: 40 milligram(s) orally once a day (23 Sep 2018 19:55)  Spiriva Respimat 28 ACT 2.5 mcg/inh inhalation aerosol: 2 puff(s) inhaled once a day (23 Sep 2018 19:55)  tamsulosin 0.4 mg oral capsule: 1 cap(s) orally once a day (27 Jun 2018 20:38)  Vitamin B12: orally once a day (27 Jun 2018 20:38)  Vitamin B6: orally once a day (27 Jun 2018 20:38)      REVIEW OF SYSTEMS:  Constitutional: generalized weaakness  Eyes: No itching or discharge from the eyes  ENT: lperiorbital edema  CV: No chest pain. No palpitations. No lightheadedness or dizziness.   Resp: No dyspnea at rest. No dyspnea on exertion. No orthopnea. No wheezing. No cough. No stridor. No sputum production. No chest pain with respiration.  GI: No nausea. No vomiting. No diarrhea.  MSK: No joint pain or pain in any extremities  Integumentary: No skin lesions. No pedal edema.  Neurological: No gross motor weakness. No sensory changes.    [ ] All other systems negative  [ ] Unable to assess ROS because ________    OBJECTIVE:  ICU Vital Signs Last 24 Hrs  T(C): 36.6 (24 Sep 2018 13:51), Max: 36.6 (23 Sep 2018 17:00)  T(F): 97.9 (24 Sep 2018 13:51), Max: 97.9 (24 Sep 2018 05:51)  HR: 58 (24 Sep 2018 13:51) (58 - 88)  BP: 96/62 (24 Sep 2018 13:51) (93/44 - 112/56)  BP(mean): --  ABP: --  ABP(mean): --  RR: 16 (24 Sep 2018 13:51) (15 - 28)  SpO2: 100% (24 Sep 2018 13:51) (95% - 100%)        09-23 @ 07:01  -  09-24 @ 07:00  --------------------------------------------------------  IN: 0 mL / OUT: 800 mL / NET: -800 mL    09-24 @ 07:01  -  09-24 @ 15:59  --------------------------------------------------------  IN: 440 mL / OUT: 200 mL / NET: 240 mL      CAPILLARY BLOOD GLUCOSE          PHYSICAL EXAM:  General: Awake, alert, oriented X 3.   HEENT: l periorbital edema  Lymph Nodes: No palpable lymphadenopathy  Neck: No JVD. No carotid bruit.   Respiratory: Normal chest expansion                         Normal and equal air entry                         No wheeze, rhonchi or rales.  Cardiovascular: S1 S2 normal. No murmurs, rubs or gallops.   Abdomen: Soft, non-tender, non-distended. No organomegaly.  BS +  Extremities: Warm to touch. Peripheral pulse palpable. No pedal edema.   Skin: No rashes or skin lesions  Neurological: Motor and sensory examination equal and normal in all four extremities.  Psychiatry: Appropriate mood and affect.    HOSPITAL MEDICATIONS:  MEDICATIONS  (STANDING):  ALBUTerol/ipratropium for Nebulization 3 milliLiter(s) Nebulizer every 6 hours  amitriptyline 10 milliGRAM(s) Oral three times a day  atorvastatin 20 milliGRAM(s) Oral at bedtime  clopidogrel Tablet 75 milliGRAM(s) Oral daily  enoxaparin Injectable 40 milliGRAM(s) SubCutaneous every 24 hours  finasteride 5 milliGRAM(s) Oral daily  folic acid 1 milliGRAM(s) Oral daily  furosemide   Injectable 40 milliGRAM(s) IV Push daily  levothyroxine 50 MICROGram(s) Oral daily  montelukast 10 milliGRAM(s) Oral daily  pantoprazole    Tablet 40 milliGRAM(s) Oral before breakfast  phenytoin   Capsule 100 milliGRAM(s) Oral every 6 hours  predniSONE   Tablet 5 milliGRAM(s) Oral daily  tamsulosin 0.4 milliGRAM(s) Oral at bedtime  tiotropium 18 MICROgram(s) Capsule 1 Capsule(s) Inhalation daily    MEDICATIONS  (PRN):  acetaminophen   Tablet .. 650 milliGRAM(s) Oral every 6 hours PRN Mild Pain (1 - 3)  ALPRAZolam 0.25 milliGRAM(s) Oral four times a day PRN anxieity      LABS:                        9.5    4.8   )-----------( 158      ( 24 Sep 2018 07:10 )             31.1     09-24    140  |  103  |  24<H>  ----------------------------<  81  3.8   |  33<H>  |  0.74    Ca    9.5      24 Sep 2018 07:10  Mg     1.7     09-24    TPro  6.7  /  Alb  2.4<L>  /  TBili  0.1<L>  /  DBili  x   /  AST  27  /  ALT  39  /  AlkPhos  195<H>  09-24    PT/INR - ( 23 Sep 2018 17:00 )   PT: 11.6 sec;   INR: 1.04 ratio         PTT - ( 23 Sep 2018 17:00 )  PTT:34.6 sec    Arterial Blood Gas:  09-23 @ 17:00  7.37/48/138/--/99/--  ABG lactate: --        MICROBIOLOGY:     RADIOLOGY:  [ ] Reviewed and interpreted by me     EKG:

## 2018-09-24 NOTE — CONSULT NOTE ADULT - ASSESSMENT
copd:  no changesProfound neuro muscular weakness: tsh wnl, possible paraneoplastc syndrome.  ct of chest and abdomen ordered

## 2018-09-24 NOTE — CONSULT NOTE ADULT - ASSESSMENT
In summary the pt is a chronically ill man who did not present for cardiac symptomatology.  he may have a component of chronic diastolic heart failure  with pulm htn and mod as.    no evidence of acs.    would continue premorbid meds and leave on diuretic chronically as tolerated    Palliative care evaluation would be appropriate if not already done

## 2018-09-24 NOTE — PROGRESS NOTE ADULT - PROBLEM SELECTOR PLAN 3
Falls Precautions  PT Eval s/p Chemo/XRT & Esophagectomy c/b strictures req. dilation-last 6 wks ago.  Pending CT Chest- also has h/o Esophogeal CA- r/o Paraneoplastic syndrome.

## 2018-09-24 NOTE — DIETITIAN INITIAL EVALUATION ADULT. - PERTINENT MEDS FT
Plavix, Xanax, Elavil, Lipitor, Proscar, Lasix, Synthroid, singular, Protonix, Spiriva, Dilantin, Deltasone, Flomax

## 2018-09-24 NOTE — PROGRESS NOTE ADULT - ASSESSMENT
82yo male w. 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 dilation-last 6 wks ago). Recent discharge from  9.18.18 for COPD Exab. Since discharge c/o progressive fatigue, generalized weakness s/p fall 3 days ago (not eval'd after this event). Presented to ED s/p another fall yesterday & acute onset of SOB.       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 s/p recent D/C from  9/18/18 for COPD exacerbation. Since D/C home, pt has been more fatigue, difficulty ambulating sec to generalized weakness, s/p fall 2 days ago with bruise over L eye and L face (but did not go to ED), today more lethargic than usual and had gradual increase SOB, s/p another fall today after leaning over to  a napkin but no injuries but subsequently developed severe acute SOB, no wheezing, no assoc dizziness, palps, nausea, chest pain or diaphoresis. EMS arrived and pt placed on CPAP with immediate relief.   states COPD exac usually assoc with wheezing but not this episode 84yo male w. 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 dilation-last 6 wks ago). Recent discharge from  9.18.18 for COPD Exab. Since discharge c/o progressive fatigue, generalized weakness s/p fall 3 days ago (not eval'd after this event). Presented to ED s/p another fall yesterday & acute onset of SOB.

## 2018-09-24 NOTE — CHART NOTE - NSCHARTNOTEFT_GEN_A_CORE
Upon Nutritional Assessment by the Registered Dietitian your patient was determined to meet criteria / has evidence of the following diagnosis/diagnoses:          [ ]  Mild Protein Calorie Malnutrition        [ ]  Moderate Protein Calorie Malnutrition        [ X] Severe Protein Calorie Malnutrition        [ ] Unspecified Protein Calorie Malnutrition        [ ] Underweight / BMI <19        [ ] Morbid Obesity / BMI > 40      Findings as based on:  [X ] Comprehensive nutrition assessment- decreased po intake (50%) observed.  [X ] Nutrition Focused Physical Exam- evidence of body fat/ muscle mass   [ ] Other:       Nutrition Plan/Recommendations:  Ensure TID with meals.        PROVIDER Section:     By signing this assessment you are acknowledging and agree with the diagnosis/diagnoses assigned by the Registered Dietitian    Comments:

## 2018-09-24 NOTE — GOALS OF CARE CONVERSATION - PERSONAL ADVANCE DIRECTIVE - CONVERSATION DETAILS
Patient sleeping, wife at bedside. She states patient was discharged from [facility] with Homecare services. Patient received visits from RN and PT. However, wife states he continued to be very weak, and she brought him into ED, patient readmitted. Wife states they have legal documents making her HCP, and that patient has MOLST from admission to [facility]  It is at home and she will bring it in. Dr. Osullivan made aware and will initiate DNR with patient.

## 2018-09-24 NOTE — PROGRESS NOTE ADULT - PROBLEM SELECTOR PLAN 5
Possible CHF Exacerbation  Cards Consult  May d/c tele s/p Multiple falls recently w. c/o LUE pain  L Shoulder/UE XR

## 2018-09-25 ENCOUNTER — APPOINTMENT (OUTPATIENT)
Dept: THORACIC SURGERY | Facility: CLINIC | Age: 83
End: 2018-09-25

## 2018-09-25 LAB
ANION GAP SERPL CALC-SCNC: 2 MMOL/L — LOW (ref 5–17)
BUN SERPL-MCNC: 22 MG/DL — SIGNIFICANT CHANGE UP (ref 7–23)
CALCIUM SERPL-MCNC: 9.2 MG/DL — SIGNIFICANT CHANGE UP (ref 8.4–10.5)
CHLORIDE SERPL-SCNC: 103 MMOL/L — SIGNIFICANT CHANGE UP (ref 96–108)
CO2 SERPL-SCNC: 32 MMOL/L — HIGH (ref 22–31)
CREAT SERPL-MCNC: 0.73 MG/DL — SIGNIFICANT CHANGE UP (ref 0.5–1.3)
GLUCOSE SERPL-MCNC: 53 MG/DL — LOW (ref 70–99)
HBA1C BLD-MCNC: 5 % — SIGNIFICANT CHANGE UP (ref 4–5.6)
HCT VFR BLD CALC: 29.9 % — LOW (ref 39–50)
HGB BLD-MCNC: 9.5 G/DL — LOW (ref 13–17)
MCHC RBC-ENTMCNC: 30.8 PG — SIGNIFICANT CHANGE UP (ref 27–34)
MCHC RBC-ENTMCNC: 31.9 GM/DL — LOW (ref 32–36)
MCV RBC AUTO: 96.4 FL — SIGNIFICANT CHANGE UP (ref 80–100)
PLATELET # BLD AUTO: 140 K/UL — LOW (ref 150–400)
POTASSIUM SERPL-MCNC: 4.1 MMOL/L — SIGNIFICANT CHANGE UP (ref 3.5–5.3)
POTASSIUM SERPL-SCNC: 4.1 MMOL/L — SIGNIFICANT CHANGE UP (ref 3.5–5.3)
RBC # BLD: 3.1 M/UL — LOW (ref 4.2–5.8)
RBC # FLD: 16.4 % — HIGH (ref 10.3–14.5)
SODIUM SERPL-SCNC: 137 MMOL/L — SIGNIFICANT CHANGE UP (ref 135–145)
WBC # BLD: 4.1 K/UL — SIGNIFICANT CHANGE UP (ref 3.8–10.5)
WBC # FLD AUTO: 4.1 K/UL — SIGNIFICANT CHANGE UP (ref 3.8–10.5)

## 2018-09-25 PROCEDURE — 74176 CT ABD & PELVIS W/O CONTRAST: CPT | Mod: 26

## 2018-09-25 PROCEDURE — 73030 X-RAY EXAM OF SHOULDER: CPT | Mod: 26,LT

## 2018-09-25 PROCEDURE — 99233 SBSQ HOSP IP/OBS HIGH 50: CPT

## 2018-09-25 PROCEDURE — 71250 CT THORAX DX C-: CPT | Mod: 26

## 2018-09-25 PROCEDURE — 73060 X-RAY EXAM OF HUMERUS: CPT | Mod: 26,LT

## 2018-09-25 RX ORDER — IOHEXOL 300 MG/ML
30 INJECTION, SOLUTION INTRAVENOUS ONCE
Qty: 0 | Refills: 0 | Status: COMPLETED | OUTPATIENT
Start: 2018-09-25 | End: 2018-09-25

## 2018-09-25 RX ADMIN — TAMSULOSIN HYDROCHLORIDE 0.4 MILLIGRAM(S): 0.4 CAPSULE ORAL at 21:27

## 2018-09-25 RX ADMIN — FINASTERIDE 5 MILLIGRAM(S): 5 TABLET, FILM COATED ORAL at 11:07

## 2018-09-25 RX ADMIN — Medication 10 MILLIGRAM(S): at 13:11

## 2018-09-25 RX ADMIN — Medication 1 MILLIGRAM(S): at 11:06

## 2018-09-25 RX ADMIN — Medication 100 MILLIGRAM(S): at 17:16

## 2018-09-25 RX ADMIN — PANTOPRAZOLE SODIUM 40 MILLIGRAM(S): 20 TABLET, DELAYED RELEASE ORAL at 06:58

## 2018-09-25 RX ADMIN — Medication 50 MICROGRAM(S): at 06:58

## 2018-09-25 RX ADMIN — IOHEXOL 30 MILLILITER(S): 300 INJECTION, SOLUTION INTRAVENOUS at 11:06

## 2018-09-25 RX ADMIN — Medication 3 MILLILITER(S): at 16:01

## 2018-09-25 RX ADMIN — Medication 3 MILLILITER(S): at 09:34

## 2018-09-25 RX ADMIN — Medication 10 MILLIGRAM(S): at 06:43

## 2018-09-25 RX ADMIN — Medication 100 MILLIGRAM(S): at 11:06

## 2018-09-25 RX ADMIN — Medication 100 MILLIGRAM(S): at 23:19

## 2018-09-25 RX ADMIN — Medication 3 MILLILITER(S): at 22:33

## 2018-09-25 RX ADMIN — Medication 10 MILLIGRAM(S): at 21:27

## 2018-09-25 RX ADMIN — MONTELUKAST 10 MILLIGRAM(S): 4 TABLET, CHEWABLE ORAL at 11:06

## 2018-09-25 RX ADMIN — Medication 100 MILLIGRAM(S): at 06:43

## 2018-09-25 RX ADMIN — Medication 5 MILLIGRAM(S): at 06:58

## 2018-09-25 RX ADMIN — Medication 650 MILLIGRAM(S): at 04:55

## 2018-09-25 RX ADMIN — Medication 650 MILLIGRAM(S): at 03:55

## 2018-09-25 RX ADMIN — ATORVASTATIN CALCIUM 20 MILLIGRAM(S): 80 TABLET, FILM COATED ORAL at 21:28

## 2018-09-25 RX ADMIN — ENOXAPARIN SODIUM 40 MILLIGRAM(S): 100 INJECTION SUBCUTANEOUS at 23:20

## 2018-09-25 RX ADMIN — CLOPIDOGREL BISULFATE 75 MILLIGRAM(S): 75 TABLET, FILM COATED ORAL at 11:06

## 2018-09-25 NOTE — PROGRESS NOTE ADULT - SUBJECTIVE AND OBJECTIVE BOX
Patient is a 83y old  Male who presents with a chief complaint of SOB      Patient seen and examined at bedside. Sitting up in bed, reports feeling much better. Denies any SOB at this time. Denies pain or discomfort.    ALLERGIES:  penicillin (Rash)    MEDICATIONS  (STANDING):  ALBUTerol/ipratropium for Nebulization 3 milliLiter(s) Nebulizer every 6 hours  amitriptyline 10 milliGRAM(s) Oral three times a day  atorvastatin 20 milliGRAM(s) Oral at bedtime  clopidogrel Tablet 75 milliGRAM(s) Oral daily  enoxaparin Injectable 40 milliGRAM(s) SubCutaneous every 24 hours  finasteride 5 milliGRAM(s) Oral daily  folic acid 1 milliGRAM(s) Oral daily  furosemide    Tablet 40 milliGRAM(s) Oral daily  iohexol 300 mG (iodine)/mL Oral Solution 30 milliLiter(s) Oral once  levothyroxine 50 MICROGram(s) Oral daily  montelukast 10 milliGRAM(s) Oral daily  pantoprazole    Tablet 40 milliGRAM(s) Oral before breakfast  phenytoin   Capsule 100 milliGRAM(s) Oral every 6 hours  predniSONE   Tablet 5 milliGRAM(s) Oral daily  tamsulosin 0.4 milliGRAM(s) Oral at bedtime  tiotropium 18 MICROgram(s) Capsule 1 Capsule(s) Inhalation daily    MEDICATIONS  (PRN):  acetaminophen   Tablet .. 650 milliGRAM(s) Oral every 6 hours PRN Mild Pain (1 - 3)  ALPRAZolam 0.25 milliGRAM(s) Oral four times a day PRN anxieity    Vital Signs Last 24 Hrs  T(F): 97.5 (25 Sep 2018 06:13), Max: 97.9 (24 Sep 2018 13:51)  HR: 60 (25 Sep 2018 06:13) (51 - 78)  BP: 110/54 (25 Sep 2018 06:13) (90/55 - 110/54)  RR: 15 (25 Sep 2018 06:13) (15 - 16)  SpO2: 98% (25 Sep 2018 09:34) (98% - 100%)  I&O's Summary    24 Sep 2018 07:01  -  25 Sep 2018 07:00  --------------------------------------------------------  IN: 440 mL / OUT: 750 mL / NET: -310 mL    25 Sep 2018 07:01  -  25 Sep 2018 11:07  --------------------------------------------------------  IN: 120 mL / OUT: 0 mL / NET: 120 mL      BMI (kg/m2): 22.8 (09-23-18 @ 22:41)  PHYSICAL EXAM:  General: NAD, A/O x 3  Neck: Supple, No JVD  Lungs: Clear to auscultation bilaterally  Cardio: RRR, S1/S2, No murmurs  Abdomen: Soft, Nontender, Nondistended; Bowel sounds present  Extremities: No calf tenderness, No pitting edema    LABS:                        9.5    4.1   )-----------( 140      ( 25 Sep 2018 05:30 )             29.9       09-25    137  |  103  |  22  ----------------------------<  53  4.1   |  32  |  0.73    Ca    9.2      25 Sep 2018 05:30  Mg     1.7     09-24    TPro  6.7  /  Alb  2.4  /  TBili  0.1  /  DBili  x   /  AST  27  /  ALT  39  /  AlkPhos  195  09-24     eGFR if Non African American: 86 mL/min/1.73M2 (09-25-18 @ 05:30)  eGFR if : 99 mL/min/1.73M2 (09-25-18 @ 05:30)    PT/INR - ( 23 Sep 2018 17:00 )   PT: 11.6 sec;   INR: 1.04 ratio         PTT - ( 23 Sep 2018 17:00 )  PTT:34.6 sec     CARDIAC MARKERS ( 24 Sep 2018 07:10 )  <.017 ng/mL / x     / x     / x     / x      CARDIAC MARKERS ( 23 Sep 2018 23:15 )  <.017 ng/mL / x     / x     / x     / x      CARDIAC MARKERS ( 23 Sep 2018 17:00 )  <.017 ng/mL / x     / x     / x     / x            TSH 3.99   TSH with FT4 reflex --  Total T3 --    CAPILLARY BLOOD GLUCOSE    RADIOLOGY & ADDITIONAL TESTS:    Care Discussed with Consultants/Other Providers: Dr. POLLO March Patient is a 83y old  Male who presents with a chief complaint of SOB      Patient seen and examined at bedside. Sitting up in bed, reports feeling much better. Denies any SOB at this time. Reports tenderness to left heel.     ALLERGIES:  penicillin (Rash)    MEDICATIONS  (STANDING):  ALBUTerol/ipratropium for Nebulization 3 milliLiter(s) Nebulizer every 6 hours  amitriptyline 10 milliGRAM(s) Oral three times a day  atorvastatin 20 milliGRAM(s) Oral at bedtime  clopidogrel Tablet 75 milliGRAM(s) Oral daily  enoxaparin Injectable 40 milliGRAM(s) SubCutaneous every 24 hours  finasteride 5 milliGRAM(s) Oral daily  folic acid 1 milliGRAM(s) Oral daily  furosemide    Tablet 40 milliGRAM(s) Oral daily  iohexol 300 mG (iodine)/mL Oral Solution 30 milliLiter(s) Oral once  levothyroxine 50 MICROGram(s) Oral daily  montelukast 10 milliGRAM(s) Oral daily  pantoprazole    Tablet 40 milliGRAM(s) Oral before breakfast  phenytoin   Capsule 100 milliGRAM(s) Oral every 6 hours  predniSONE   Tablet 5 milliGRAM(s) Oral daily  tamsulosin 0.4 milliGRAM(s) Oral at bedtime  tiotropium 18 MICROgram(s) Capsule 1 Capsule(s) Inhalation daily    MEDICATIONS  (PRN):  acetaminophen   Tablet .. 650 milliGRAM(s) Oral every 6 hours PRN Mild Pain (1 - 3)  ALPRAZolam 0.25 milliGRAM(s) Oral four times a day PRN anxieity    Vital Signs Last 24 Hrs  T(F): 97.5 (25 Sep 2018 06:13), Max: 97.9 (24 Sep 2018 13:51)  HR: 60 (25 Sep 2018 06:13) (51 - 78)  BP: 110/54 (25 Sep 2018 06:13) (90/55 - 110/54)  RR: 15 (25 Sep 2018 06:13) (15 - 16)  SpO2: 98% (25 Sep 2018 09:34) (98% - 100%)  I&O's Summary    24 Sep 2018 07:01  -  25 Sep 2018 07:00  --------------------------------------------------------  IN: 440 mL / OUT: 750 mL / NET: -310 mL    25 Sep 2018 07:01  -  25 Sep 2018 11:07  --------------------------------------------------------  IN: 120 mL / OUT: 0 mL / NET: 120 mL      BMI (kg/m2): 22.8 (09-23-18 @ 22:41)  PHYSICAL EXAM:  General: NAD, A/O x 3  Eyes: left eye ecchymosis, denies blurriness. Reports previous double vision has now improved  Neck: Supple, No JVD  Lungs: Clear to auscultation bilaterally  Cardio: RRR, S1/S2, No murmurs  Abdomen: Soft, Nontender, Nondistended; Bowel sounds present  Extremities: No calf tenderness, No pitting edema. Reports left heel tenderness    LABS:                        9.5    4.1   )-----------( 140      ( 25 Sep 2018 05:30 )             29.9       09-25    137  |  103  |  22  ----------------------------<  53  4.1   |  32  |  0.73    Ca    9.2      25 Sep 2018 05:30  Mg     1.7     09-24    TPro  6.7  /  Alb  2.4  /  TBili  0.1  /  DBili  x   /  AST  27  /  ALT  39  /  AlkPhos  195  09-24     eGFR if Non African American: 86 mL/min/1.73M2 (09-25-18 @ 05:30)  eGFR if : 99 mL/min/1.73M2 (09-25-18 @ 05:30)    PT/INR - ( 23 Sep 2018 17:00 )   PT: 11.6 sec;   INR: 1.04 ratio         PTT - ( 23 Sep 2018 17:00 )  PTT:34.6 sec     CARDIAC MARKERS ( 24 Sep 2018 07:10 )  <.017 ng/mL / x     / x     / x     / x      CARDIAC MARKERS ( 23 Sep 2018 23:15 )  <.017 ng/mL / x     / x     / x     / x      CARDIAC MARKERS ( 23 Sep 2018 17:00 )  <.017 ng/mL / x     / x     / x     / x            TSH 3.99   TSH with FT4 reflex --  Total T3 --    CAPILLARY BLOOD GLUCOSE    RADIOLOGY & ADDITIONAL TESTS:    Care Discussed with Consultants/Other Providers: Dr. POLLO March Patient is a 83y old  Male who presents with a chief complaint of SOB      Patient seen and examined at bedside. Sitting up in bed, reports feeling much better. Denies any SOB at this time. Reports tenderness to left heel.     ALLERGIES:  penicillin (Rash)    MEDICATIONS  (STANDING):  ALBUTerol/ipratropium for Nebulization 3 milliLiter(s) Nebulizer every 6 hours  amitriptyline 10 milliGRAM(s) Oral three times a day  atorvastatin 20 milliGRAM(s) Oral at bedtime  clopidogrel Tablet 75 milliGRAM(s) Oral daily  enoxaparin Injectable 40 milliGRAM(s) SubCutaneous every 24 hours  finasteride 5 milliGRAM(s) Oral daily  folic acid 1 milliGRAM(s) Oral daily  furosemide    Tablet 40 milliGRAM(s) Oral daily  iohexol 300 mG (iodine)/mL Oral Solution 30 milliLiter(s) Oral once  levothyroxine 50 MICROGram(s) Oral daily  montelukast 10 milliGRAM(s) Oral daily  pantoprazole    Tablet 40 milliGRAM(s) Oral before breakfast  phenytoin   Capsule 100 milliGRAM(s) Oral every 6 hours  predniSONE   Tablet 5 milliGRAM(s) Oral daily  tamsulosin 0.4 milliGRAM(s) Oral at bedtime  tiotropium 18 MICROgram(s) Capsule 1 Capsule(s) Inhalation daily    MEDICATIONS  (PRN):  acetaminophen   Tablet .. 650 milliGRAM(s) Oral every 6 hours PRN Mild Pain (1 - 3)  ALPRAZolam 0.25 milliGRAM(s) Oral four times a day PRN anxieity    Vital Signs Last 24 Hrs  T(F): 97.5 (25 Sep 2018 06:13), Max: 97.9 (24 Sep 2018 13:51)  HR: 60 (25 Sep 2018 06:13) (51 - 78)  BP: 110/54 (25 Sep 2018 06:13) (90/55 - 110/54)  RR: 15 (25 Sep 2018 06:13) (15 - 16)  SpO2: 98% (25 Sep 2018 09:34) (98% - 100%)  I&O's Summary    24 Sep 2018 07:01  -  25 Sep 2018 07:00  --------------------------------------------------------  IN: 440 mL / OUT: 750 mL / NET: -310 mL    25 Sep 2018 07:01  -  25 Sep 2018 11:07  --------------------------------------------------------  IN: 120 mL / OUT: 0 mL / NET: 120 mL      BMI (kg/m2): 22.8 (09-23-18 @ 22:41)    PHYSICAL EXAM:  General: NAD, A/O x 3  Eyes: left eye ecchymosis, denies blurriness. Reports previous double vision has now improved  Neck: Supple, No JVD  Lungs: Clear to auscultation bilaterally  Cardio: RRR, S1/S2, No murmurs  Abdomen: Soft, Nontender, Nondistended; Bowel sounds present  Extremities: No calf tenderness, No pitting edema. Reports left heel tenderness  Neuro: overall weak, no focal deficits, AAOX3    LABS:                        9.5    4.1   )-----------( 140      ( 25 Sep 2018 05:30 )             29.9       09-25    137  |  103  |  22  ----------------------------<  53  4.1   |  32  |  0.73    Ca    9.2      25 Sep 2018 05:30  Mg     1.7     09-24    TPro  6.7  /  Alb  2.4  /  TBili  0.1  /  DBili  x   /  AST  27  /  ALT  39  /  AlkPhos  195  09-24     eGFR if Non African American: 86 mL/min/1.73M2 (09-25-18 @ 05:30)  eGFR if : 99 mL/min/1.73M2 (09-25-18 @ 05:30)    PT/INR - ( 23 Sep 2018 17:00 )   PT: 11.6 sec;   INR: 1.04 ratio         PTT - ( 23 Sep 2018 17:00 )  PTT:34.6 sec     CARDIAC MARKERS ( 24 Sep 2018 07:10 )  <.017 ng/mL / x     / x     / x     / x      CARDIAC MARKERS ( 23 Sep 2018 23:15 )  <.017 ng/mL / x     / x     / x     / x      CARDIAC MARKERS ( 23 Sep 2018 17:00 )  <.017 ng/mL / x     / x     / x     / x            TSH 3.99   TSH with FT4 reflex --  Total T3 --    CAPILLARY BLOOD GLUCOSE    RADIOLOGY & ADDITIONAL TESTS:    Care Discussed with Consultants/Other Providers: Dr. POLLO March

## 2018-09-25 NOTE — PROGRESS NOTE ADULT - PROBLEM SELECTOR PLAN 1
in s/o COPD vs. CHF Exacerbation. Thought to be CHF exacerbation with component of COPD exacerbation, now improved, pt refused any further Lasix, clinically is euvoelmic now, no wheeze on exam, resolved, CT chest ordered by Pulm to evaluate for malignancy

## 2018-09-25 NOTE — PHYSICAL THERAPY INITIAL EVALUATION ADULT - ADDITIONAL COMMENTS
Pt lives in house with wife, no steps.  Pt uses RW for ambulation and has WC.  Wife assists with ADLs

## 2018-09-25 NOTE — PROGRESS NOTE ADULT - PROBLEM SELECTOR PLAN 3
s/p Chemo/XRT & Esophagectomy c/b strictures req. dilation-last 6 wks ago.  Pending CT Chest- also has h/o Esophogeal CA- r/o Paraneoplastic syndrome. s/p Chemo/XRT & Esophagectomy c/b strictures req. dilation-last 6 wks ago.  Pending CT Chest and abd, has h/o Esophogeal CA- r/o Paraneoplastic syndrome.

## 2018-09-25 NOTE — PROGRESS NOTE ADULT - ASSESSMENT
82yo male w. 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 dilation-last 6 wks ago). Recent discharge from  9.18.18 for COPD Exab. Since discharge c/o progressive fatigue, generalized weakness s/p fall 3 days ago (not eval'd after this event). Presented to ED s/p another fall yesterday & acute onset of SOB. 82yo male w. 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 dilation-last 6 wks ago). Recent discharge from  9.18.18 for COPD Exab. 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

## 2018-09-25 NOTE — PROGRESS NOTE ADULT - PROBLEM SELECTOR PLAN 2
Cont home Meds  Palliative/Pulm Consult. Pt. is DNR (Wife to bring in MOLST form) Cont home meds  Palliative/Pulm Consult. Pt. is DNR (Wife to bring in MOLST form)

## 2018-09-26 LAB
ANION GAP SERPL CALC-SCNC: 4 MMOL/L — LOW (ref 5–17)
BUN SERPL-MCNC: 24 MG/DL — HIGH (ref 7–23)
CALCIUM SERPL-MCNC: 9.2 MG/DL — SIGNIFICANT CHANGE UP (ref 8.4–10.5)
CHLORIDE SERPL-SCNC: 100 MMOL/L — SIGNIFICANT CHANGE UP (ref 96–108)
CO2 SERPL-SCNC: 33 MMOL/L — HIGH (ref 22–31)
CREAT SERPL-MCNC: 0.7 MG/DL — SIGNIFICANT CHANGE UP (ref 0.5–1.3)
GLUCOSE SERPL-MCNC: 71 MG/DL — SIGNIFICANT CHANGE UP (ref 70–99)
HCT VFR BLD CALC: 29.2 % — LOW (ref 39–50)
HGB BLD-MCNC: 8.8 G/DL — LOW (ref 13–17)
MCHC RBC-ENTMCNC: 29.2 PG — SIGNIFICANT CHANGE UP (ref 27–34)
MCHC RBC-ENTMCNC: 30 GM/DL — LOW (ref 32–36)
MCV RBC AUTO: 97.6 FL — SIGNIFICANT CHANGE UP (ref 80–100)
PHENYTOIN FREE SERPL-MCNC: 31.2 UG/ML — CRITICAL HIGH (ref 10–20)
PLATELET # BLD AUTO: 130 K/UL — LOW (ref 150–400)
POTASSIUM SERPL-MCNC: 4.5 MMOL/L — SIGNIFICANT CHANGE UP (ref 3.5–5.3)
POTASSIUM SERPL-SCNC: 4.5 MMOL/L — SIGNIFICANT CHANGE UP (ref 3.5–5.3)
RBC # BLD: 2.99 M/UL — LOW (ref 4.2–5.8)
RBC # FLD: 16.3 % — HIGH (ref 10.3–14.5)
SODIUM SERPL-SCNC: 137 MMOL/L — SIGNIFICANT CHANGE UP (ref 135–145)
WBC # BLD: 4.3 K/UL — SIGNIFICANT CHANGE UP (ref 3.8–10.5)
WBC # FLD AUTO: 4.3 K/UL — SIGNIFICANT CHANGE UP (ref 3.8–10.5)

## 2018-09-26 PROCEDURE — 99239 HOSP IP/OBS DSCHRG MGMT >30: CPT

## 2018-09-26 PROCEDURE — 99233 SBSQ HOSP IP/OBS HIGH 50: CPT

## 2018-09-26 RX ORDER — TIOTROPIUM BROMIDE 18 UG/1
1 CAPSULE ORAL; RESPIRATORY (INHALATION) DAILY
Qty: 0 | Refills: 0 | Status: DISCONTINUED | OUTPATIENT
Start: 2018-09-26 | End: 2018-09-28

## 2018-09-26 RX ADMIN — Medication 650 MILLIGRAM(S): at 04:30

## 2018-09-26 RX ADMIN — Medication 100 MILLIGRAM(S): at 17:56

## 2018-09-26 RX ADMIN — TAMSULOSIN HYDROCHLORIDE 0.4 MILLIGRAM(S): 0.4 CAPSULE ORAL at 21:03

## 2018-09-26 RX ADMIN — Medication 3 MILLILITER(S): at 22:51

## 2018-09-26 RX ADMIN — Medication 5 MILLIGRAM(S): at 05:43

## 2018-09-26 RX ADMIN — CLOPIDOGREL BISULFATE 75 MILLIGRAM(S): 75 TABLET, FILM COATED ORAL at 12:23

## 2018-09-26 RX ADMIN — Medication 3 MILLILITER(S): at 04:51

## 2018-09-26 RX ADMIN — Medication 3 MILLILITER(S): at 09:26

## 2018-09-26 RX ADMIN — Medication 650 MILLIGRAM(S): at 03:31

## 2018-09-26 RX ADMIN — FINASTERIDE 5 MILLIGRAM(S): 5 TABLET, FILM COATED ORAL at 12:23

## 2018-09-26 RX ADMIN — PANTOPRAZOLE SODIUM 40 MILLIGRAM(S): 20 TABLET, DELAYED RELEASE ORAL at 05:43

## 2018-09-26 RX ADMIN — Medication 10 MILLIGRAM(S): at 05:43

## 2018-09-26 RX ADMIN — Medication 50 MICROGRAM(S): at 05:43

## 2018-09-26 RX ADMIN — ENOXAPARIN SODIUM 40 MILLIGRAM(S): 100 INJECTION SUBCUTANEOUS at 21:04

## 2018-09-26 RX ADMIN — Medication 3 MILLILITER(S): at 15:22

## 2018-09-26 RX ADMIN — Medication 100 MILLIGRAM(S): at 05:43

## 2018-09-26 RX ADMIN — ATORVASTATIN CALCIUM 20 MILLIGRAM(S): 80 TABLET, FILM COATED ORAL at 21:03

## 2018-09-26 RX ADMIN — MONTELUKAST 10 MILLIGRAM(S): 4 TABLET, CHEWABLE ORAL at 12:22

## 2018-09-26 RX ADMIN — Medication 1 MILLIGRAM(S): at 12:23

## 2018-09-26 NOTE — CONSULT NOTE ADULT - ASSESSMENT
82 yo male with history of remote CVA, remote single nocturnal GTC seizure on long term Dilantin, anxiety, multiple medical problems as detailed above.  Polypharmacy.  Doubt a neuromuscular disorder such as ALS or Parkinsonism, etc.  I suspect a metabolic encephalopathy which in part could be related to polypharmacy.  Specifically Dilantin and amitriptyline and  S/P multiple CVA, COPD.    REC:  D/C amitriptyline, gradually  reduce Dilantin and check level.

## 2018-09-26 NOTE — PROGRESS NOTE ADULT - PROBLEM SELECTOR PLAN 1
Thought to be CHF exacerbation with component of COPD exacerbation, now improved, pt refused any further Lasix, clinically is euvoelmic now, no wheeze on exam, resolved, CT chest ordered by Pulm to evaluate for malignancy Thought to be CHF exacerbation with component of COPD exacerbation, now improved, pt refused any further Lasix, clinically is euvoelmic now, no wheeze on exam, resolved, CT chest ordered by Pulm to evaluate for malignancy negative

## 2018-09-26 NOTE — PROGRESS NOTE ADULT - PROBLEM SELECTOR PLAN 3
s/p Chemo/XRT & Esophagectomy c/b strictures req. dilation-last 6 wks ago.  Pending CT Chest and abd, has h/o Esophogeal CA- r/o Paraneoplastic syndrome. s/p Chemo/XRT & Esophagectomy c/b strictures req. dilation-last 6 wks ago.  Negative CT Chest and abd, has h/o Esophogeal CA

## 2018-09-26 NOTE — PROGRESS NOTE ADULT - PROBLEM SELECTOR PLAN 3
s/p Chemo/XRT & Esophagectomy c/b strictures req. dilation-last 6 wks ago.  Negative CT Chest and abd, has h/o Esophogeal CA

## 2018-09-26 NOTE — PROGRESS NOTE ADULT - SUBJECTIVE AND OBJECTIVE BOX
Patient is a 83y old  Male who presents with a chief complaint of SOB      Patient seen and examined at bedside. Sitting up in bedside chair. Reports feeling better than prior days. Reports occasional double vision to left eye (with + ecchymosis). Also c/o pain to sacral wound area and tenderness to left heel.    ALLERGIES:  penicillin (Rash)    MEDICATIONS  (STANDING):  ALBUTerol/ipratropium for Nebulization 3 milliLiter(s) Nebulizer every 6 hours  amitriptyline 10 milliGRAM(s) Oral three times a day  atorvastatin 20 milliGRAM(s) Oral at bedtime  clopidogrel Tablet 75 milliGRAM(s) Oral daily  enoxaparin Injectable 40 milliGRAM(s) SubCutaneous every 24 hours  finasteride 5 milliGRAM(s) Oral daily  folic acid 1 milliGRAM(s) Oral daily  furosemide    Tablet 40 milliGRAM(s) Oral daily  levothyroxine 50 MICROGram(s) Oral daily  montelukast 10 milliGRAM(s) Oral daily  pantoprazole    Tablet 40 milliGRAM(s) Oral before breakfast  phenytoin   Capsule 100 milliGRAM(s) Oral every 6 hours  predniSONE   Tablet 5 milliGRAM(s) Oral daily  tamsulosin 0.4 milliGRAM(s) Oral at bedtime  tiotropium 18 MICROgram(s) Capsule 1 Capsule(s) Inhalation daily    MEDICATIONS  (PRN):  acetaminophen   Tablet .. 650 milliGRAM(s) Oral every 6 hours PRN Mild Pain (1 - 3)  ALPRAZolam 0.25 milliGRAM(s) Oral four times a day PRN anxieity    Vital Signs Last 24 Hrs  T(F): 97.5 (26 Sep 2018 05:03), Max: 98.4 (25 Sep 2018 20:54)  HR: 69 (26 Sep 2018 05:03) (58 - 70)  BP: 100/62 (26 Sep 2018 05:03) (98/45 - 100/62)  RR: 14 (26 Sep 2018 05:03) (14 - 14)  SpO2: 99% (26 Sep 2018 05:03) (98% - 100%)  I&O's Summary    25 Sep 2018 07:01  -  26 Sep 2018 07:00  --------------------------------------------------------  IN: 360 mL / OUT: 450 mL / NET: -90 mL      BMI (kg/m2): 22.4 (09-26-18 @ 05:03)  PHYSICAL EXAM:  General: NAD, A/O x 3  Eyes: left eye ecchymosis  Neck: Supple, No JVD  Lungs: Clear to auscultation bilaterally  Cardio: RRR, S1/S2, No murmurs  Abdomen: Soft, Nontender, Nondistended; Bowel sounds present  Extremities: No calf tenderness, No pitting edema, left knee abrasion with scab, left heel tenderness    LABS:                        8.8    4.3   )-----------( 130      ( 26 Sep 2018 06:06 )             29.2       09-26    137  |  100  |  24  ----------------------------<  71  4.5   |  33  |  0.70    Ca    9.2      26 Sep 2018 06:06  Mg     1.7     09-24    TPro  6.7  /  Alb  2.4  /  TBili  0.1  /  DBili  x   /  AST  27  /  ALT  39  /  AlkPhos  195  09-24     eGFR if Non African American: 87 mL/min/1.73M2 (09-26-18 @ 06:06)  eGFR if African American: 101 mL/min/1.73M2 (09-26-18 @ 06:06)    PT/INR - ( 23 Sep 2018 17:00 )   PT: 11.6 sec;   INR: 1.04 ratio         PTT - ( 23 Sep 2018 17:00 )  PTT:34.6 sec     CARDIAC MARKERS ( 24 Sep 2018 07:10 )  <.017 ng/mL / x     / x     / x     / x      CARDIAC MARKERS ( 23 Sep 2018 23:15 )  <.017 ng/mL / x     / x     / x     / x      CARDIAC MARKERS ( 23 Sep 2018 17:00 )  <.017 ng/mL / x     / x     / x     / x            TSH 3.99   TSH with FT4 reflex --  Total T3 --    09-25 [id number] 5.0      RADIOLOGY & ADDITIONAL TESTS:  < from: Xray Shoulder 2 Views, Left (09.25.18 @ 14:02) >  IMPRESSION: No acute finding.    < from: CT Chest No Cont (09.25.18 @ 14:00) >  IMPRESSION:  Stable posttreatmentfindings    < from: CT Abdomen and Pelvis w/ Oral Cont (09.25.18 @ 13:55) >  IMPRESSION:  Stable posttreatmentfindings      Care Discussed with Consultants/Other Providers: Dr POLLO March Patient is a 83y old  Male who presents with a chief complaint of SOB      Patient seen and examined at bedside. Sitting up in bedside chair. Reports feeling better than prior days. Reports occasional double vision to left eye (with + ecchymosis). Also c/o pain to sacral wound area and tenderness to left heel.    ALLERGIES:  penicillin (Rash)    MEDICATIONS  (STANDING):  ALBUTerol/ipratropium for Nebulization 3 milliLiter(s) Nebulizer every 6 hours  amitriptyline 10 milliGRAM(s) Oral three times a day  atorvastatin 20 milliGRAM(s) Oral at bedtime  clopidogrel Tablet 75 milliGRAM(s) Oral daily  enoxaparin Injectable 40 milliGRAM(s) SubCutaneous every 24 hours  finasteride 5 milliGRAM(s) Oral daily  folic acid 1 milliGRAM(s) Oral daily  furosemide    Tablet 40 milliGRAM(s) Oral daily  levothyroxine 50 MICROGram(s) Oral daily  montelukast 10 milliGRAM(s) Oral daily  pantoprazole    Tablet 40 milliGRAM(s) Oral before breakfast  phenytoin   Capsule 100 milliGRAM(s) Oral every 6 hours  predniSONE   Tablet 5 milliGRAM(s) Oral daily  tamsulosin 0.4 milliGRAM(s) Oral at bedtime  tiotropium 18 MICROgram(s) Capsule 1 Capsule(s) Inhalation daily    MEDICATIONS  (PRN):  acetaminophen   Tablet .. 650 milliGRAM(s) Oral every 6 hours PRN Mild Pain (1 - 3)  ALPRAZolam 0.25 milliGRAM(s) Oral four times a day PRN anxieity    Vital Signs Last 24 Hrs  T(F): 97.5 (26 Sep 2018 05:03), Max: 98.4 (25 Sep 2018 20:54)  HR: 69 (26 Sep 2018 05:03) (58 - 70)  BP: 100/62 (26 Sep 2018 05:03) (98/45 - 100/62)  RR: 14 (26 Sep 2018 05:03) (14 - 14)  SpO2: 99% (26 Sep 2018 05:03) (98% - 100%)  I&O's Summary    25 Sep 2018 07:01  -  26 Sep 2018 07:00  --------------------------------------------------------  IN: 360 mL / OUT: 450 mL / NET: -90 mL      BMI (kg/m2): 22.4 (09-26-18 @ 05:03)  PHYSICAL EXAM:  General: NAD, A/O x 3  Eyes: left eye ecchymosis  Neck: Supple, No JVD  Lungs: Clear to auscultation bilaterally  Cardio: RRR, S1/S2, No murmurs  Abdomen: Soft, Nontender, Nondistended; Bowel sounds present  Extremities: No calf tenderness, No pitting edema, left knee abrasion with scab, left heel tenderness  Neuro: no focal deficits    LABS:                        8.8    4.3   )-----------( 130      ( 26 Sep 2018 06:06 )             29.2       09-26    137  |  100  |  24  ----------------------------<  71  4.5   |  33  |  0.70    Ca    9.2      26 Sep 2018 06:06  Mg     1.7     09-24    TPro  6.7  /  Alb  2.4  /  TBili  0.1  /  DBili  x   /  AST  27  /  ALT  39  /  AlkPhos  195  09-24     eGFR if Non African American: 87 mL/min/1.73M2 (09-26-18 @ 06:06)  eGFR if African American: 101 mL/min/1.73M2 (09-26-18 @ 06:06)    PT/INR - ( 23 Sep 2018 17:00 )   PT: 11.6 sec;   INR: 1.04 ratio         PTT - ( 23 Sep 2018 17:00 )  PTT:34.6 sec     CARDIAC MARKERS ( 24 Sep 2018 07:10 )  <.017 ng/mL / x     / x     / x     / x      CARDIAC MARKERS ( 23 Sep 2018 23:15 )  <.017 ng/mL / x     / x     / x     / x      CARDIAC MARKERS ( 23 Sep 2018 17:00 )  <.017 ng/mL / x     / x     / x     / x            TSH 3.99   TSH with FT4 reflex --  Total T3 --    09-25 [id number] 5.0      RADIOLOGY & ADDITIONAL TESTS:  < from: Xray Shoulder 2 Views, Left (09.25.18 @ 14:02) >  IMPRESSION: No acute finding.    < from: CT Chest No Cont (09.25.18 @ 14:00) >  IMPRESSION:  Stable posttreatmentfindings    < from: CT Abdomen and Pelvis w/ Oral Cont (09.25.18 @ 13:55) >  IMPRESSION:  Stable posttreatmentfindings      Care Discussed with Consultants/Other Providers: Dr POLLO March

## 2018-09-26 NOTE — PROGRESS NOTE ADULT - SUBJECTIVE AND OBJECTIVE BOX
Follow-up Pulm Progress Note    Declan Ashby MD  [phone]    No new respiratory events overnight.  Denies SOB/CP. Resp status stable.    Medications:  Vital Signs Last 24 Hrs  T(C): 36.4 (26 Sep 2018 05:03), Max: 36.9 (25 Sep 2018 20:54)  T(F): 97.5 (26 Sep 2018 05:03), Max: 98.4 (25 Sep 2018 20:54)  HR: 69 (26 Sep 2018 05:03) (58 - 70)  BP: 100/62 (26 Sep 2018 05:03) (98/45 - 100/62)  BP(mean): --  RR: 14 (26 Sep 2018 05:03) (14 - 14)  SpO2: 99% (26 Sep 2018 05:03) (98% - 100%)          09-25 @ 07:01  -  09-26 @ 07:00  --------------------------------------------------------  IN: 360 mL / OUT: 450 mL / NET: -90 mL        LABS:                        8.8    4.3   )-----------( 130      ( 26 Sep 2018 06:06 )             29.2     09-26    137  |  100  |  24<H>  ----------------------------<  71  4.5   |  33<H>  |  0.70    Ca    9.2      26 Sep 2018 06:06            Serum Pro-Brain Natriuretic Peptide: 3981 pg/mL (09-23-18 @ 17:00)              Physical Examination:  PULM: Clear to auscultation bilaterally, no significant sputum production  CVS: Regular rate and rhythm, no murmurs, rubs, or gallops  ABD: Soft, non-tender  EXT:  No clubbing, cyanosis, or edema

## 2018-09-26 NOTE — PROGRESS NOTE ADULT - PROBLEM SELECTOR PLAN 4
Falls Precautions  PT Eval Seen by Neuro today for possible neuromuscular disorder per Isma but no acute findings, Amitryptiline stopped, lowering Dilantin levels per Neuro, can follow levels outpt  Falls Precautions  PT Eval - ANA planned for today/tomorrow whenever arranged

## 2018-09-26 NOTE — PROGRESS NOTE ADULT - PROBLEM SELECTOR PLAN 1
Thought to be CHF exacerbation with component of COPD exacerbation, now improved, pt refused any further Lasix, clinically is euvoelmic now, no wheeze on exam, resolved, CT chest negative

## 2018-09-26 NOTE — PROGRESS NOTE ADULT - ASSESSMENT
84yo male w. 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 dilation-last 6 wks ago). Recent discharge from  9.18.18 for COPD Exab. 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

## 2018-09-26 NOTE — PROGRESS NOTE ADULT - PROBLEM SELECTOR PLAN 4
Seen by Neuro today for possible neuromuscular disorder per Isma but no acute findings, Amitryptiline stopped, lowering Dilantin levels per Neuro, can follow levels outpt  Falls Precautions  PT Eval - ANA planned for today/tomorrow whenever arranged

## 2018-09-26 NOTE — CONSULT NOTE ADULT - SUBJECTIVE AND OBJECTIVE BOX
Neurology consult    MEJIA [id number]     Patient is a 83y old  Male who presents with a chief complaint of SOB (26 Sep 2018 10:14)      HPI:  83M hx of CAD s/p stent 6/2017, CVA/TIA with no residual deficits, sz d/o, RA on Pred 5mg maintenance,  COPD, diastolic CHF, hx of esophageal ca s/p chemo/RT and esophagectomy complicated with strictures requiring dilatation, last about 6 weeks ago. s/p recent D/C from  9/18/18 for COPD exacerbation. Since D/C home, pt has been more fatigue, difficulty ambulating sec to generalized weakness, s/p fall 2 days ago with bruise over L eye and L face (but did not go to ED), today more lethargic than usual and had gradual increase SOB, s/p another fall today after leaning over to  a napkin but no injuries but subsequently developed severe acute SOB, no wheezing, no assoc dizziness, palps, nausea, chest pain or diaphoresis. EMS arrived and pt placed on CPAP with immediate relief.   states COPD exac usually assoc with wheezing but not this episode (23 Sep 2018 21:20)    Asked to see to r/o a neuromuscular disorder to explain his global weakness and fatigue.      REVIEW OF SYSTEMS:    Constitutional: No fever, chills, fatigue, weakness  Eyes: no eye pain, visual disturbances, or discharge  ENT:  No difficulty hearing, tinnitus, vertigo; No sinus or throat pain  Neck: No pain or stiffness  Respiratory: No cough, dyspnea, wheezing   Cardiovascular: No chest pain, palpitations,   Gastrointestinal: No abdominal or epigastric pain. No nausea, vomiting  No diarrhea or constipation.   Genitourinary: No dysuria, frequency, hematuria or incontinence  Neurological: No headaches, lightheadedness, vertigo, numbness or tremors  Psychiatric: No depression, anxiety, mood swings or difficulty sleeping  Musculoskeletal: No joint pain or swelling; No muscle, back or extremity pain  Skin: No itching, burning, rashes or lesions   Lymph Nodes: No enlarged glands  Endocrine: No heat or cold intolerance; No hair loss, No h/o diabetes or thyroid dysfunction  Allergy and Immunologic: No hives or eczema    MEDICATIONS    acetaminophen   Tablet .. 650 milliGRAM(s) Oral every 6 hours PRN  ALBUTerol/ipratropium for Nebulization 3 milliLiter(s) Nebulizer every 6 hours  ALPRAZolam 0.25 milliGRAM(s) Oral four times a day PRN  amitriptyline 10 milliGRAM(s) Oral three times a day  atorvastatin 20 milliGRAM(s) Oral at bedtime  clopidogrel Tablet 75 milliGRAM(s) Oral daily  enoxaparin Injectable 40 milliGRAM(s) SubCutaneous every 24 hours  finasteride 5 milliGRAM(s) Oral daily  folic acid 1 milliGRAM(s) Oral daily  furosemide    Tablet 40 milliGRAM(s) Oral daily  levothyroxine 50 MICROGram(s) Oral daily  montelukast 10 milliGRAM(s) Oral daily  pantoprazole    Tablet 40 milliGRAM(s) Oral before breakfast  phenytoin   Capsule 100 milliGRAM(s) Oral every 6 hours  predniSONE   Tablet 5 milliGRAM(s) Oral daily  tamsulosin 0.4 milliGRAM(s) Oral at bedtime  tiotropium 18 MICROgram(s) Capsule 1 Capsule(s) Inhalation daily      PMH: Coronary artery disease  OA (osteoarthritis) of knee  Former smoker, stopped smoking in distant past  Former smoker  Rheumatoid arthritis  Seizure disorder  Asthma  Hyperlipidemia  BPH (benign prostatic hyperplasia)  HTN (hypertension)  Esophagus cancer  Chronic allergic rhinitis  BCC (basal cell carcinoma)  Cerebrovascular accident (CVA)  Anxiety       PSH: Anastomotic leak following esophagectomy  History of tonsillectomy  H/O heart artery stent  Knee arthropathy  History of total hip replacement  History of hernia repair      Family history:   FAMILY HISTORY:  Family history of heart attack  Family history of pulmonary embolism: father @ 49 yr old  FH: CAD (coronary artery disease) (Sibling)      SOCIAL HISTORY:  No history of tobacco or alcohol use     Allergies    penicillin (Rash)    Intolerances          Weight (kg): 57.3 (09-26 @ 05:03)    Vital Signs Last 24 Hrs  T(C): 36.4 (26 Sep 2018 05:03), Max: 36.9 (25 Sep 2018 20:54)  T(F): 97.5 (26 Sep 2018 05:03), Max: 98.4 (25 Sep 2018 20:54)  HR: 69 (26 Sep 2018 05:03) (58 - 70)  BP: 100/62 (26 Sep 2018 05:03) (98/45 - 100/62)  BP(mean): --  RR: 14 (26 Sep 2018 05:03) (14 - 14)  SpO2: 99% (26 Sep 2018 05:03) (98% - 100%)      On Neurological Examination:    Left periorbital ecchymoses Patient is lethargic vague and inattentive.  He is orient except exact date. He is not aphasic, speaks in soft voice.  CNs PERRl, EOMI, no nystagmus, facial asymmetry, tongue midline.  Motor reveals diffuse weakness and atrophy but no fasciculations and no definite focality.  DTRs hypo.  Toes down left and neutral right.  Pin is equal.  Gait not tested.                                                       LABS:  CBC Full  -  ( 26 Sep 2018 06:06 )  WBC Count : 4.3 K/uL  Hemoglobin : 8.8 g/dL  Hematocrit : 29.2 %  Platelet Count - Automated : 130 K/uL  Mean Cell Volume : 97.6 fl  Mean Cell Hemoglobin : 29.2 pg  Mean Cell Hemoglobin Concentration : 30.0 gm/dL  Auto Neutrophil # : x  Auto Lymphocyte # : x  Auto Monocyte # : x  Auto Eosinophil # : x  Auto Basophil # : x  Auto Neutrophil % : x  Auto Lymphocyte % : x  Auto Monocyte % : x  Auto Eosinophil % : x  Auto Basophil % : x      09-26    137  |  100  |  24<H>  ----------------------------<  71  4.5   |  33<H>  |  0.70    Ca    9.2      26 Sep 2018 06:06        Hemoglobin A1C:                 RADIOLOGY  CT: < from: CT Head No Cont (09.23.18 @ 19:19) >    INTERPRETATION:  CLINICAL INFORMATION: Head injury    TECHNIQUE: Noncontrast axial CT images of the brain were acquired from   the base of skull to vertex.    COMPARISON: CT head 6/14/2018.    FINDINGS: No intracranial hemorrhage is seen. Chronic left occipital and   right parietal infarcts are stable. Mild periventricular and subcortical   white matter hypoattenuation without mass effect is noted, non-specific,   but likely related to chronic small vessel ischemic changes.    Cerebral volume loss is present with proportional prominence of the sulci   and ventricles..     No mass effect or midline shift is seen. Basal cisterns are not effaced.    The visualized paranasal sinuses and tympanomastoid cavities are clear.     No osseous abnormality seen.    IMPRESSION: No intracranial hemorrhage or mass effect. Stable chronic   infarcts.          < end of copied text >    MRI:

## 2018-09-27 LAB
ANION GAP SERPL CALC-SCNC: 1 MMOL/L — LOW (ref 5–17)
BUN SERPL-MCNC: 23 MG/DL — SIGNIFICANT CHANGE UP (ref 7–23)
CALCIUM SERPL-MCNC: 9.3 MG/DL — SIGNIFICANT CHANGE UP (ref 8.4–10.5)
CHLORIDE SERPL-SCNC: 101 MMOL/L — SIGNIFICANT CHANGE UP (ref 96–108)
CO2 SERPL-SCNC: 36 MMOL/L — HIGH (ref 22–31)
CREAT SERPL-MCNC: 0.71 MG/DL — SIGNIFICANT CHANGE UP (ref 0.5–1.3)
GLUCOSE SERPL-MCNC: 68 MG/DL — LOW (ref 70–99)
HCT VFR BLD CALC: 28.3 % — LOW (ref 39–50)
HGB BLD-MCNC: 8.6 G/DL — LOW (ref 13–17)
MCHC RBC-ENTMCNC: 29.6 PG — SIGNIFICANT CHANGE UP (ref 27–34)
MCHC RBC-ENTMCNC: 30.6 GM/DL — LOW (ref 32–36)
MCV RBC AUTO: 96.8 FL — SIGNIFICANT CHANGE UP (ref 80–100)
PLATELET # BLD AUTO: 120 K/UL — LOW (ref 150–400)
POTASSIUM SERPL-MCNC: 4.2 MMOL/L — SIGNIFICANT CHANGE UP (ref 3.5–5.3)
POTASSIUM SERPL-SCNC: 4.2 MMOL/L — SIGNIFICANT CHANGE UP (ref 3.5–5.3)
RBC # BLD: 2.92 M/UL — LOW (ref 4.2–5.8)
RBC # FLD: 16.5 % — HIGH (ref 10.3–14.5)
SODIUM SERPL-SCNC: 138 MMOL/L — SIGNIFICANT CHANGE UP (ref 135–145)
WBC # BLD: 3.6 K/UL — LOW (ref 3.8–10.5)
WBC # FLD AUTO: 3.6 K/UL — LOW (ref 3.8–10.5)

## 2018-09-27 PROCEDURE — 99233 SBSQ HOSP IP/OBS HIGH 50: CPT

## 2018-09-27 RX ADMIN — FINASTERIDE 5 MILLIGRAM(S): 5 TABLET, FILM COATED ORAL at 11:35

## 2018-09-27 RX ADMIN — Medication 3 MILLILITER(S): at 03:42

## 2018-09-27 RX ADMIN — Medication 1 MILLIGRAM(S): at 11:35

## 2018-09-27 RX ADMIN — Medication 3 MILLILITER(S): at 21:40

## 2018-09-27 RX ADMIN — Medication 0.25 MILLIGRAM(S): at 11:42

## 2018-09-27 RX ADMIN — CLOPIDOGREL BISULFATE 75 MILLIGRAM(S): 75 TABLET, FILM COATED ORAL at 11:35

## 2018-09-27 RX ADMIN — Medication 50 MICROGRAM(S): at 05:49

## 2018-09-27 RX ADMIN — Medication 5 MILLIGRAM(S): at 05:49

## 2018-09-27 RX ADMIN — TAMSULOSIN HYDROCHLORIDE 0.4 MILLIGRAM(S): 0.4 CAPSULE ORAL at 21:34

## 2018-09-27 RX ADMIN — Medication 3 MILLILITER(S): at 10:12

## 2018-09-27 RX ADMIN — ENOXAPARIN SODIUM 40 MILLIGRAM(S): 100 INJECTION SUBCUTANEOUS at 22:51

## 2018-09-27 RX ADMIN — PANTOPRAZOLE SODIUM 40 MILLIGRAM(S): 20 TABLET, DELAYED RELEASE ORAL at 05:49

## 2018-09-27 RX ADMIN — ATORVASTATIN CALCIUM 20 MILLIGRAM(S): 80 TABLET, FILM COATED ORAL at 21:34

## 2018-09-27 RX ADMIN — TIOTROPIUM BROMIDE 1 CAPSULE(S): 18 CAPSULE ORAL; RESPIRATORY (INHALATION) at 10:13

## 2018-09-27 RX ADMIN — Medication 3 MILLILITER(S): at 16:09

## 2018-09-27 RX ADMIN — MONTELUKAST 10 MILLIGRAM(S): 4 TABLET, CHEWABLE ORAL at 11:35

## 2018-09-27 NOTE — PROGRESS NOTE ADULT - PROBLEM SELECTOR PLAN 1
Thought to be CHF exacerbation with component of COPD exacerbation, now improved, pt refused any further Lasix, clinically is euvoelmic now, no wheeze on exam, resolved, CT chest ordered by Pulm to evaluate for malignancy negative

## 2018-09-27 NOTE — PROGRESS NOTE ADULT - SUBJECTIVE AND OBJECTIVE BOX
Patient is a 83y old  Male who presents with a chief complaint of SOB (27 Sep 2018 08:08)          Patient seen and examined at bedside.    ALLERGIES:  penicillin (Rash)        Vital Signs Last 24 Hrs  T(F): 97.4 (27 Sep 2018 05:14), Max: 97.6 (26 Sep 2018 16:19)  HR: 71 (27 Sep 2018 05:14) (68 - 77)  BP: 107/48 (27 Sep 2018 05:14) (95/52 - 107/48)  RR: 14 (27 Sep 2018 05:14) (14 - 14)  SpO2: 100% (27 Sep 2018 05:14) (96% - 100%)  I&O's Summary    26 Sep 2018 07:01  -  27 Sep 2018 07:00  --------------------------------------------------------  IN: 960 mL / OUT: 1175 mL / NET: -215 mL      MEDICATIONS:  acetaminophen   Tablet .. 650 milliGRAM(s) Oral every 6 hours PRN  ALBUTerol/ipratropium for Nebulization 3 milliLiter(s) Nebulizer every 6 hours  ALPRAZolam 0.25 milliGRAM(s) Oral four times a day PRN  atorvastatin 20 milliGRAM(s) Oral at bedtime  clopidogrel Tablet 75 milliGRAM(s) Oral daily  enoxaparin Injectable 40 milliGRAM(s) SubCutaneous every 24 hours  finasteride 5 milliGRAM(s) Oral daily  folic acid 1 milliGRAM(s) Oral daily  furosemide    Tablet 40 milliGRAM(s) Oral daily  levothyroxine 50 MICROGram(s) Oral daily  montelukast 10 milliGRAM(s) Oral daily  pantoprazole    Tablet 40 milliGRAM(s) Oral before breakfast  predniSONE   Tablet 5 milliGRAM(s) Oral daily  tamsulosin 0.4 milliGRAM(s) Oral at bedtime  tiotropium 18 MICROgram(s) Capsule 1 Capsule(s) Inhalation daily      PHYSICAL EXAM:  General: NAD, A/O x 3  ENT: MMM  Neck: Supple, No JVD  Lungs: Clear to auscultation bilaterally  Cardio: RRR, S1/S2, No murmurs  Abdomen: Soft, Nontender, Nondistended; Bowel sounds present  Extremities: No cyanosis, No edema    LABS:                        8.6    3.6   )-----------( 120      ( 27 Sep 2018 06:30 )             28.3     09-27    138  |  101  |  23  ----------------------------<  68  4.2   |  36  |  0.71    Ca    9.3      27 Sep 2018 06:30      eGFR if Non African American: 87 mL/min/1.73M2 (09-27-18 @ 06:30)  eGFR if African American: 101 mL/min/1.73M2 (09-27-18 @ 06:30)        CAPILLARY BLOOD GLUCOSE        09-25 [id number] 5.0          RADIOLOGY & ADDITIONAL TESTS:    < from: CT Chest No Cont (09.25.18 @ 14:00) >  EXAM:  CT ABDOMEN AND PELVIS OC    EXAM:  CT CHEST      PROCEDURE DATE:  09/25/2018        INTERPRETATION:  CT chest, abdomen and pelvis without contrast  Comparison 06/27/18  History COPD, occult malignancy, Para neoplastic syndrome    There are surgical clips consistent with previous esophagectomy and   gastric pull-through. There is mild dependent atelectasis is trace   effusions in the lung bases, worse on the right without pulmonary mass.   The pull-through is not distended. There is no bulky adenopathy given   limitations of a noncontrast exam. The airways are clear. There is mild   cardiomegaly without pericardial effusion. There is a small volume of   inspissated or aspirated secretions in the trachea.    The liver, gallbladder, spleen and adrenals are grossly normal given   limitations of a noncontrast exam. Pancreas is atrophic. There are small   cysts in both kidneys without hydronephrosis. There is no bowel   dilatation or ascites or adenopathy or focal inflammation of the   peritoneal fat. Urinary bladder is contracted. The left hip has been   replaced. There is mild to moderate generalized anasarca.    Chronic compression fractures are noted without evidence of osseous   metastasis.    IMPRESSION:  Stable posttreatmentfindings                  MARCELINA STAPLES M.D., ATTENDING RADIOLOGIST  This document has been electronically signed. Sep 25 2018  2:24PM    < end of copied text >                    Care Discussed with Consultants/Other Providers: Patient is a 83y old  Male who presents with a chief complaint of SOB (27 Sep 2018 08:08)    Still c/o double vision.          Patient seen and examined at bedside.    ALLERGIES:  penicillin (Rash)        Vital Signs Last 24 Hrs  T(F): 97.4 (27 Sep 2018 05:14), Max: 97.6 (26 Sep 2018 16:19)  HR: 71 (27 Sep 2018 05:14) (68 - 77)  BP: 107/48 (27 Sep 2018 05:14) (95/52 - 107/48)  RR: 14 (27 Sep 2018 05:14) (14 - 14)  SpO2: 100% (27 Sep 2018 05:14) (96% - 100%)  I&O's Summary    26 Sep 2018 07:01  -  27 Sep 2018 07:00  --------------------------------------------------------  IN: 960 mL / OUT: 1175 mL / NET: -215 mL      MEDICATIONS:  acetaminophen   Tablet .. 650 milliGRAM(s) Oral every 6 hours PRN  ALBUTerol/ipratropium for Nebulization 3 milliLiter(s) Nebulizer every 6 hours  ALPRAZolam 0.25 milliGRAM(s) Oral four times a day PRN  atorvastatin 20 milliGRAM(s) Oral at bedtime  clopidogrel Tablet 75 milliGRAM(s) Oral daily  enoxaparin Injectable 40 milliGRAM(s) SubCutaneous every 24 hours  finasteride 5 milliGRAM(s) Oral daily  folic acid 1 milliGRAM(s) Oral daily  furosemide    Tablet 40 milliGRAM(s) Oral daily  levothyroxine 50 MICROGram(s) Oral daily  montelukast 10 milliGRAM(s) Oral daily  pantoprazole    Tablet 40 milliGRAM(s) Oral before breakfast  predniSONE   Tablet 5 milliGRAM(s) Oral daily  tamsulosin 0.4 milliGRAM(s) Oral at bedtime  tiotropium 18 MICROgram(s) Capsule 1 Capsule(s) Inhalation daily      PHYSICAL EXAM:  General: NAD, A/O x 3  ENT: MMM  Neck: Supple, No JVD  Lungs: Clear to auscultation bilaterally  Cardio: RRR, S1/S2, No murmurs  Abdomen: Soft, Nontender, Nondistended; Bowel sounds present  Extremities: No cyanosis, No edema    LABS:                        8.6    3.6   )-----------( 120      ( 27 Sep 2018 06:30 )             28.3     09-27    138  |  101  |  23  ----------------------------<  68  4.2   |  36  |  0.71    Ca    9.3      27 Sep 2018 06:30      eGFR if Non African American: 87 mL/min/1.73M2 (09-27-18 @ 06:30)  eGFR if African American: 101 mL/min/1.73M2 (09-27-18 @ 06:30)        CAPILLARY BLOOD GLUCOSE        09-25 [id number] 5.0          RADIOLOGY & ADDITIONAL TESTS:    < from: CT Chest No Cont (09.25.18 @ 14:00) >  EXAM:  CT ABDOMEN AND PELVIS OC    EXAM:  CT CHEST      PROCEDURE DATE:  09/25/2018        INTERPRETATION:  CT chest, abdomen and pelvis without contrast  Comparison 06/27/18  History COPD, occult malignancy, Para neoplastic syndrome    There are surgical clips consistent with previous esophagectomy and   gastric pull-through. There is mild dependent atelectasis is trace   effusions in the lung bases, worse on the right without pulmonary mass.   The pull-through is not distended. There is no bulky adenopathy given   limitations of a noncontrast exam. The airways are clear. There is mild   cardiomegaly without pericardial effusion. There is a small volume of   inspissated or aspirated secretions in the trachea.    The liver, gallbladder, spleen and adrenals are grossly normal given   limitations of a noncontrast exam. Pancreas is atrophic. There are small   cysts in both kidneys without hydronephrosis. There is no bowel   dilatation or ascites or adenopathy or focal inflammation of the   peritoneal fat. Urinary bladder is contracted. The left hip has been   replaced. There is mild to moderate generalized anasarca.    Chronic compression fractures are noted without evidence of osseous   metastasis.    IMPRESSION:  Stable posttreatmentfindings                  MARCELINA STAPLES M.D., ATTENDING RADIOLOGIST  This document has been electronically signed. Sep 25 2018  2:24PM    < end of copied text >                    Care Discussed with Consultants/Other Providers:

## 2018-09-27 NOTE — PROGRESS NOTE ADULT - SUBJECTIVE AND OBJECTIVE BOX
Neurology Progress Note    Subjective & Objective:  More alert and attentive this morning.  Speech is normal.  No new focality.    Remarkably the Dilantin level is elevated to 31.2 which is significant.  Dilantin which I had reduced to BID empirically has therefore been stopped.  The amitriptyline was also stopped.                  MEDICATIONS  (STANDING):  ALBUTerol/ipratropium for Nebulization 3 milliLiter(s) Nebulizer every 6 hours  atorvastatin 20 milliGRAM(s) Oral at bedtime  clopidogrel Tablet 75 milliGRAM(s) Oral daily  enoxaparin Injectable 40 milliGRAM(s) SubCutaneous every 24 hours  finasteride 5 milliGRAM(s) Oral daily  folic acid 1 milliGRAM(s) Oral daily  furosemide    Tablet 40 milliGRAM(s) Oral daily  levothyroxine 50 MICROGram(s) Oral daily  montelukast 10 milliGRAM(s) Oral daily  pantoprazole    Tablet 40 milliGRAM(s) Oral before breakfast  predniSONE   Tablet 5 milliGRAM(s) Oral daily  tamsulosin 0.4 milliGRAM(s) Oral at bedtime  tiotropium 18 MICROgram(s) Capsule 1 Capsule(s) Inhalation daily    MEDICATIONS  (PRN):  acetaminophen   Tablet .. 650 milliGRAM(s) Oral every 6 hours PRN Mild Pain (1 - 3)  ALPRAZolam 0.25 milliGRAM(s) Oral four times a day PRN anxieity

## 2018-09-27 NOTE — PROGRESS NOTE ADULT - PROBLEM SELECTOR PLAN 8
dilantin level 31.2  dilantin on hold  check dilantin level daily for next three days  likely restart tomorrow

## 2018-09-27 NOTE — PROGRESS NOTE ADULT - SUBJECTIVE AND OBJECTIVE BOX
Follow-up Pulm Progress Note:  Patient seen and examined at 9:15 AM today while I was at [facility]    Declan Ashby MD  [phone]    No new respiratory events overnight.  Denies SOB/CP.     Medications:  Vital Signs Last 24 Hrs  T(C): 36.3 (27 Sep 2018 05:14), Max: 36.4 (26 Sep 2018 16:19)  T(F): 97.4 (27 Sep 2018 05:14), Max: 97.6 (26 Sep 2018 16:19)  HR: 71 (27 Sep 2018 05:14) (68 - 77)  BP: 107/48 (27 Sep 2018 05:14) (95/52 - 107/48)  BP(mean): --  RR: 14 (27 Sep 2018 05:14) (14 - 14)  SpO2: 100% (27 Sep 2018 05:14) (96% - 100%)          09-26 @ 07:01  -  09-27 @ 07:00  --------------------------------------------------------  IN: 960 mL / OUT: 1175 mL / NET: -215 mL        LABS:                        8.6    3.6   )-----------( 120      ( 27 Sep 2018 06:30 )             28.3     09-27    138  |  101  |  23  ----------------------------<  68<L>  4.2   |  36<H>  |  0.71    Ca    9.3      27 Sep 2018 06:30                CULTURES:        Physical Examination:  PULM: Clear to auscultation bilaterally, no significant sputum production  CVS: Regular rate and rhythm, no murmurs, rubs, or gallops  ABD: Soft, non-tender  EXT:  No clubbing, cyanosis, or edema  Neuro:  more awake and full of energy today    RADIOLOGY REVIEWED  No new radiologic exams        TTE:

## 2018-09-27 NOTE — PROGRESS NOTE ADULT - ASSESSMENT
84yo male w. 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 dilation-last 6 wks ago). Recent discharge from  9.18.18 for COPD Exab. 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   neuro followup and Dilantin testing revealed elevated Dilantin level which may have been causing some of his neurologic symptomatology

## 2018-09-27 NOTE — PROGRESS NOTE ADULT - PROBLEM SELECTOR PLAN 8
dilantin level 31.2  dilantin on hold  check dilantin level daily for next three days  likely restart tomorrow dilantin level 31.2  dilantin on hold  check dilantin level daily for next three days - likely resume on 9/29/18 per neurology   likely restart tomorrow

## 2018-09-27 NOTE — PROGRESS NOTE ADULT - ASSESSMENT
82yo male w. 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 dilation-last 6 wks ago). Recent discharge from  9.18.18 for COPD Exab. 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

## 2018-09-28 ENCOUNTER — TRANSCRIPTION ENCOUNTER (OUTPATIENT)
Age: 83
End: 2018-09-28

## 2018-09-28 ENCOUNTER — APPOINTMENT (OUTPATIENT)
Dept: PULMONOLOGY | Facility: CLINIC | Age: 83
End: 2018-09-28

## 2018-09-28 VITALS
RESPIRATION RATE: 14 BRPM | OXYGEN SATURATION: 100 % | TEMPERATURE: 98 F | HEART RATE: 86 BPM | SYSTOLIC BLOOD PRESSURE: 96 MMHG | DIASTOLIC BLOOD PRESSURE: 54 MMHG

## 2018-09-28 LAB — PHENYTOIN FREE SERPL-MCNC: 23.4 UG/ML — HIGH (ref 10–20)

## 2018-09-28 PROCEDURE — 99239 HOSP IP/OBS DSCHRG MGMT >30: CPT

## 2018-09-28 PROCEDURE — 99232 SBSQ HOSP IP/OBS MODERATE 35: CPT

## 2018-09-28 RX ORDER — FUROSEMIDE 40 MG
1 TABLET ORAL
Qty: 0 | Refills: 0 | COMMUNITY
Start: 2018-09-28

## 2018-09-28 RX ADMIN — Medication 5 MILLIGRAM(S): at 05:24

## 2018-09-28 RX ADMIN — FINASTERIDE 5 MILLIGRAM(S): 5 TABLET, FILM COATED ORAL at 11:43

## 2018-09-28 RX ADMIN — Medication 3 MILLILITER(S): at 15:14

## 2018-09-28 RX ADMIN — CLOPIDOGREL BISULFATE 75 MILLIGRAM(S): 75 TABLET, FILM COATED ORAL at 11:43

## 2018-09-28 RX ADMIN — Medication 3 MILLILITER(S): at 04:53

## 2018-09-28 RX ADMIN — Medication 50 MICROGRAM(S): at 05:24

## 2018-09-28 RX ADMIN — Medication 3 MILLILITER(S): at 09:40

## 2018-09-28 RX ADMIN — TIOTROPIUM BROMIDE 1 CAPSULE(S): 18 CAPSULE ORAL; RESPIRATORY (INHALATION) at 09:41

## 2018-09-28 RX ADMIN — Medication 1 MILLIGRAM(S): at 11:43

## 2018-09-28 RX ADMIN — MONTELUKAST 10 MILLIGRAM(S): 4 TABLET, CHEWABLE ORAL at 11:42

## 2018-09-28 RX ADMIN — PANTOPRAZOLE SODIUM 40 MILLIGRAM(S): 20 TABLET, DELAYED RELEASE ORAL at 05:24

## 2018-09-28 NOTE — DISCHARGE NOTE ADULT - MEDICATION SUMMARY - MEDICATIONS TO STOP TAKING
I will STOP taking the medications listed below when I get home from the hospital:    amitriptyline 10 mg oral tablet  -- 1 tab(s) by mouth 3 times a day

## 2018-09-28 NOTE — DISCHARGE NOTE ADULT - SECONDARY DIAGNOSIS.
Benign prostatic hyperplasia, unspecified whether lower urinary tract symptoms present Chronic diastolic CHF (congestive heart failure) Chronic obstructive pulmonary disease, unspecified COPD type Coronary artery disease involving native coronary artery of native heart, angina presence unspecified Falls Malignant neoplasm of esophagus, unspecified location

## 2018-09-28 NOTE — PROGRESS NOTE ADULT - PROBLEM SELECTOR PLAN 10
Cont home Prednisone

## 2018-09-28 NOTE — DISCHARGE NOTE ADULT - ABILITY TO HEAR (WITH HEARING AID OR HEARING APPLIANCE IF NORMALLY USED):
Mildly to Moderately Impaired: difficulty hearing in some environments or speaker may need to increase volume or speak distinctly/pt wears b/l hearing aid

## 2018-09-28 NOTE — DISCHARGE NOTE ADULT - PLAN OF CARE
prevent further changes in mental status changed dilantin level  f/u neurology changed dilantin level, stopped amitriptyline  f/u neurology changed dilantin level, stopped amitriptyline  f/u neurology  Please check Dilantin level on 9/29, 9/30 and 10/1

## 2018-09-28 NOTE — CHART NOTE - NSCHARTNOTEFT_GEN_A_CORE
NUTRITION FOLLOW UP    SPOKE WITH PATIENT'S WIFE AT BEDSIDE (PATIENT SLEEPING SOUNDLY) WHO REPORTS PATIENT HAS A GOOD APPETITE AND IS EATING WELL.  CONSUMING SOME TO ALL OF ENSURE ENLIVE SUPPLEMENT.  50% P.O. INTAKE DOCUMENTED.  TOLERATING A DASH/TLC DIET WITH ENSURE ENLIVE SUPPLEMENT 3X/DAY.    STAGE II SACRUM NOTED.  SKIN TEAR/ECCHYMOSIS ON ARM NOTED    PREVIOUS WEIGHT 128.3 LBS.  WEIGHT FROM 9/25 126.3 LBS.  WEIGHT CHANGE MAY BE SECONDARY TO FLUID.  PATIENT NOTED ON LASIX. INITIALLY PLACED AT RISK FOR MALNUTRITION.  PHYSICAL APPEARANCE-UNDERWEIGHT.    PERTINENT LABS 9/27 GLUCOSE 68 <L>    PERTINENT MEDICATIONS:  PREDNISONE, DILANTIN, LASIX, LIPITOR, FOLIC ACID, PROTONIX    CHART REVIEWED.  PATIENT REMAINS AT NUTRITION RISK SECONDARY TO MALNUTRITION AND STAGE II PRESSURE ULCER.    PATIENT'S WIFE STATES PATIENT IS BEING DISCHARGED     PLANS/RECOMMENDATIONS:    1. CONTINUE DIET/SUPPLEMENTS. ENCOURAGE P.O. INTAKE  2. MONITOR WEIGHT  3. WILL FOLLOW AS PER DEPARTMENT STANDARDS OR SOONER PRN

## 2018-09-28 NOTE — DISCHARGE NOTE ADULT - MEDICATION SUMMARY - MEDICATIONS TO TAKE
I will START or STAY ON the medications listed below when I get home from the hospital:    finasteride 5 mg oral tablet  -- 1 tab(s) by mouth once a day  -- Indication: For Bph    predniSONE  -- 5 mg by mouth once a day    Start after you complete the course of the higher dose of predisone  -- Indication: For COPD (chronic obstructive pulmonary disease)    tamsulosin 0.4 mg oral capsule  -- 1 cap(s) by mouth once a day  -- Indication: For Bph    phenytoin 100 mg oral capsule, extended release  -- 1 cap(s) by mouth 2 times a day  -- Indication: For Seizure disorder    simvastatin  -- 40 milligram(s) by mouth once a day  -- Indication: For HLD (hyperlipidemia)    ezetimibe 10 mg oral tablet  -- 1 tab(s) by mouth once a day  -- Indication: For Cholesterol    clopidogrel 75 mg oral tablet  -- 1 tab(s) by mouth once a day  -- Indication: For Cad    ALPRAZolam 0.25 mg oral tablet  -- 1 tab(s) by mouth 4 times a day, As Needed  -- Indication: For Anxiety    Spiriva Respimat 28 ACT 2.5 mcg/inh inhalation aerosol  -- 2 puff(s) inhaled once a day  -- Indication: For COPD (chronic obstructive pulmonary disease)    ProAir HFA 90 mcg/inh inhalation aerosol  -- 2 puff(s) inhaled 4 times a day  -- Indication: For COPD (chronic obstructive pulmonary disease)    Breo Ellipta 100 mcg-25 mcg/inh inhalation powder  -- 1 puff(s) inhaled once a day  -- Indication: For COPD (chronic obstructive pulmonary disease)    furosemide 40 mg oral tablet  -- 1 tab(s) by mouth once a day  -- Indication: For Acute on chronic diastolic congestive heart failure    montelukast 10 mg oral tablet  -- 1 tab(s) by mouth once a day  -- Indication: For Allergies    pantoprazole 40 mg oral delayed release tablet  -- 1 tab(s) by mouth once a day  -- Indication: For Stomach acid suppression    levothyroxine  -- 50 microgram(s) by mouth 6 times a week  -- Indication: For Hypothyroid    folic acid  -- 400 microgram(s) orally  -- Indication: For Supplement    Vitamin B6  -- orally once a day  -- Indication: For Supplement    Vitamin B12  -- orally once a day  -- Indication: For Supplement

## 2018-09-28 NOTE — DISCHARGE NOTE ADULT - CARE PLAN
Principal Discharge DX:	Encephalopathy  Goal:	prevent further changes in mental status  Assessment and plan of treatment:	changed dilantin level  f/u neurology  Secondary Diagnosis:	Benign prostatic hyperplasia, unspecified whether lower urinary tract symptoms present  Secondary Diagnosis:	Chronic diastolic CHF (congestive heart failure)  Secondary Diagnosis:	Chronic obstructive pulmonary disease, unspecified COPD type  Secondary Diagnosis:	Coronary artery disease involving native coronary artery of native heart, angina presence unspecified  Secondary Diagnosis:	Falls  Secondary Diagnosis:	Malignant neoplasm of esophagus, unspecified location Principal Discharge DX:	Encephalopathy  Goal:	prevent further changes in mental status  Assessment and plan of treatment:	changed dilantin level, stopped amitriptyline  f/u neurology  Secondary Diagnosis:	Benign prostatic hyperplasia, unspecified whether lower urinary tract symptoms present  Secondary Diagnosis:	Chronic diastolic CHF (congestive heart failure)  Secondary Diagnosis:	Chronic obstructive pulmonary disease, unspecified COPD type  Secondary Diagnosis:	Coronary artery disease involving native coronary artery of native heart, angina presence unspecified  Secondary Diagnosis:	Falls  Secondary Diagnosis:	Malignant neoplasm of esophagus, unspecified location Principal Discharge DX:	Encephalopathy  Goal:	prevent further changes in mental status  Assessment and plan of treatment:	changed dilantin level, stopped amitriptyline  f/u neurology  Please check Dilantin level on 9/29, 9/30 and 10/1  Secondary Diagnosis:	Benign prostatic hyperplasia, unspecified whether lower urinary tract symptoms present  Secondary Diagnosis:	Chronic diastolic CHF (congestive heart failure)  Secondary Diagnosis:	Chronic obstructive pulmonary disease, unspecified COPD type  Secondary Diagnosis:	Coronary artery disease involving native coronary artery of native heart, angina presence unspecified  Secondary Diagnosis:	Falls  Secondary Diagnosis:	Malignant neoplasm of esophagus, unspecified location

## 2018-09-28 NOTE — DISCHARGE NOTE ADULT - MEDICATION SUMMARY - MEDICATIONS TO CHANGE
I will SWITCH the dose or number of times a day I take the medications listed below when I get home from the hospital:    phenytoin 100 mg oral capsule, extended release  -- 1 cap(s) by mouth every 6 hours

## 2018-09-28 NOTE — DISCHARGE NOTE ADULT - PATIENT PORTAL LINK FT
You can access the [facility] Patient Portal, offered by [facility], by registering with the following website: [email or web address]

## 2018-09-28 NOTE — PROGRESS NOTE ADULT - ATTENDING COMMENTS
I have personally seen and examined patient on the above date.  I discussed the case with Kirby Duvall NP and I agree with findings and plan as detailed per note above, which I have amended where appropriate.      Dilantin level coming down. Discussed with Dr. Finney who agrees to restart at lower dose and follow dilantin level for next couple of days.    Stable for discharge after CT Chest .
I have personally seen and examined patient on the above date.  I discussed the case with Kirby Duvall NP and I agree with findings and plan as detailed per note above, which I have amended where appropriate.      continue to monitor dilantin level (remains high) and patient still with double vision.
I have personally seen and examined patient on the above date.  I discussed the case with NP and I agree with findings and plan as detailed per note above, which I have amended where appropriate.    Pt adm for shortness of breath but has weakness, weight loss, falls, awaiting malignancy work up per Palliative Isma
Patient seen by me. Case d/w wife at bedside.    Patient like NOT in COPD exacerbation - no wheezes, mild bibasilar crackles, mild JVD, trace bilateral edema, consistent with mild case of acute diastolic CHF. Patient states he already feels better - he is getting IV Lasix and I suspect we can change to PO lasix in the AM. No arrhythmias on tele monitor - okay to d/c tele monitor at this time.     Patient is s/p fall with left sided bruising - check left shoulder x-ray (has MSK pain, doubt fracture)    Patient is a significant fall risk and likely needs ANA. However, Dr. Ashby consulted for palliative care to help with GOC - high readmission risk, high fall risk, multiple comorbidities.    I have personally seen and examined patient on the above date.  I discussed the case with BAKARI Dorantes and I agree with findings and plan as detailed per note above, which I have amended where appropriate.

## 2018-09-28 NOTE — PROGRESS NOTE ADULT - SUBJECTIVE AND OBJECTIVE BOX
Patient is a 83y old  Male who presents with a chief complaint of SOB (27 Sep 2018 10:05)          Patient seen and examined at bedside.    ALLERGIES:  penicillin (Rash)        Vital Signs Last 24 Hrs  T(F): 97.3 (28 Sep 2018 05:52), Max: 97.8 (27 Sep 2018 20:40)  HR: 88 (28 Sep 2018 05:52) (66 - 88)  BP: 103/85 (28 Sep 2018 05:52) (103/85 - 107/58)  RR: 14 (28 Sep 2018 05:52) (14 - 14)  SpO2: 98% (28 Sep 2018 05:52) (92% - 99%)  I&O's Summary    27 Sep 2018 07:01  -  28 Sep 2018 07:00  --------------------------------------------------------  IN: 960 mL / OUT: 1000 mL / NET: -40 mL      MEDICATIONS:  acetaminophen   Tablet .. 650 milliGRAM(s) Oral every 6 hours PRN  ALBUTerol/ipratropium for Nebulization 3 milliLiter(s) Nebulizer every 6 hours  ALPRAZolam 0.25 milliGRAM(s) Oral four times a day PRN  atorvastatin 20 milliGRAM(s) Oral at bedtime  clopidogrel Tablet 75 milliGRAM(s) Oral daily  enoxaparin Injectable 40 milliGRAM(s) SubCutaneous every 24 hours  finasteride 5 milliGRAM(s) Oral daily  folic acid 1 milliGRAM(s) Oral daily  furosemide    Tablet 40 milliGRAM(s) Oral daily  levothyroxine 50 MICROGram(s) Oral daily  montelukast 10 milliGRAM(s) Oral daily  pantoprazole    Tablet 40 milliGRAM(s) Oral before breakfast  predniSONE   Tablet 5 milliGRAM(s) Oral daily  tamsulosin 0.4 milliGRAM(s) Oral at bedtime  tiotropium 18 MICROgram(s) Capsule 1 Capsule(s) Inhalation daily      PHYSICAL EXAM:  General: NAD, A/O x 3  ENT: MMM  Neck: Supple, No JVD  Lungs: Clear to auscultation bilaterally  Cardio: RRR, S1/S2, No murmurs  Abdomen: Soft, Nontender, Nondistended; Bowel sounds present  Extremities: No cyanosis, No edema    LABS:                        8.6    3.6   )-----------( 120      ( 27 Sep 2018 06:30 )             28.3     09-27    138  |  101  |  23  ----------------------------<  68  4.2   |  36  |  0.71    Ca    9.3      27 Sep 2018 06:30      eGFR if Non African American: 87 mL/min/1.73M2 (09-27-18 @ 06:30)  eGFR if African American: 101 mL/min/1.73M2 (09-27-18 @ 06:30)          CAPILLARY BLOOD GLUCOSE        09-25 [id number] 5.0          RADIOLOGY & ADDITIONAL TESTS:    < from: CT Chest No Cont (09.25.18 @ 14:00) >    EXAM:  CT ABDOMEN AND PELVIS OC    EXAM:  CT CHEST      PROCEDURE DATE:  09/25/2018        INTERPRETATION:  CT chest, abdomen and pelvis without contrast  Comparison 06/27/18  History COPD, occult malignancy, Para neoplastic syndrome    There are surgical clips consistent with previous esophagectomy and   gastric pull-through. There is mild dependent atelectasis is trace   effusions in the lung bases, worse on the right without pulmonary mass.   The pull-through is not distended. There is no bulky adenopathy given   limitations of a noncontrast exam. The airways are clear. There is mild   cardiomegaly without pericardial effusion. There is a small volume of   inspissated or aspirated secretions in the trachea.    The liver, gallbladder, spleen and adrenals are grossly normal given   limitations of a noncontrast exam. Pancreas is atrophic. There are small   cysts in both kidneys without hydronephrosis. There is no bowel   dilatation or ascites or adenopathy or focal inflammation of the   peritoneal fat. Urinary bladder is contracted. The left hip has been   replaced. There is mild to moderate generalized anasarca.    Chronic compression fractures are noted without evidence of osseous   metastasis.    IMPRESSION:  Stable posttreatmentfindings                  MARCELINA STAPLES M.D., ATTENDING RADIOLOGIST  This document has been electronically signed. Sep 25 2018  2:24PM    < end of copied text >                      Care Discussed with Consultants/Other Providers: Patient is a 83y old  Male who presents with a chief complaint of SOB (27 Sep 2018 10:05)    Patient feels well.  No acute issues.     Patient seen and examined at bedside.    ALLERGIES:  penicillin (Rash)        Vital Signs Last 24 Hrs  T(F): 97.3 (28 Sep 2018 05:52), Max: 97.8 (27 Sep 2018 20:40)  HR: 88 (28 Sep 2018 05:52) (66 - 88)  BP: 103/85 (28 Sep 2018 05:52) (103/85 - 107/58)  RR: 14 (28 Sep 2018 05:52) (14 - 14)  SpO2: 98% (28 Sep 2018 05:52) (92% - 99%)  I&O's Summary    27 Sep 2018 07:01  -  28 Sep 2018 07:00  --------------------------------------------------------  IN: 960 mL / OUT: 1000 mL / NET: -40 mL      MEDICATIONS:  acetaminophen   Tablet .. 650 milliGRAM(s) Oral every 6 hours PRN  ALBUTerol/ipratropium for Nebulization 3 milliLiter(s) Nebulizer every 6 hours  ALPRAZolam 0.25 milliGRAM(s) Oral four times a day PRN  atorvastatin 20 milliGRAM(s) Oral at bedtime  clopidogrel Tablet 75 milliGRAM(s) Oral daily  enoxaparin Injectable 40 milliGRAM(s) SubCutaneous every 24 hours  finasteride 5 milliGRAM(s) Oral daily  folic acid 1 milliGRAM(s) Oral daily  furosemide    Tablet 40 milliGRAM(s) Oral daily  levothyroxine 50 MICROGram(s) Oral daily  montelukast 10 milliGRAM(s) Oral daily  pantoprazole    Tablet 40 milliGRAM(s) Oral before breakfast  predniSONE   Tablet 5 milliGRAM(s) Oral daily  tamsulosin 0.4 milliGRAM(s) Oral at bedtime  tiotropium 18 MICROgram(s) Capsule 1 Capsule(s) Inhalation daily      PHYSICAL EXAM:  General: NAD, A/O x 3  ENT: MMM  Neck: Supple, No JVD  Lungs: Clear to auscultation bilaterally  Cardio: RRR, S1/S2, No murmurs  Abdomen: Soft, Nontender, Nondistended; Bowel sounds present  Extremities: No cyanosis, No edema    LABS:                        8.6    3.6   )-----------( 120      ( 27 Sep 2018 06:30 )             28.3     09-27    138  |  101  |  23  ----------------------------<  68  4.2   |  36  |  0.71    Ca    9.3      27 Sep 2018 06:30      eGFR if Non African American: 87 mL/min/1.73M2 (09-27-18 @ 06:30)  eGFR if African American: 101 mL/min/1.73M2 (09-27-18 @ 06:30)          CAPILLARY BLOOD GLUCOSE        09-25 [id number] 5.0          RADIOLOGY & ADDITIONAL TESTS:    < from: CT Chest No Cont (09.25.18 @ 14:00) >    EXAM:  CT ABDOMEN AND PELVIS OC    EXAM:  CT CHEST      PROCEDURE DATE:  09/25/2018        INTERPRETATION:  CT chest, abdomen and pelvis without contrast  Comparison 06/27/18  History COPD, occult malignancy, Para neoplastic syndrome    There are surgical clips consistent with previous esophagectomy and   gastric pull-through. There is mild dependent atelectasis is trace   effusions in the lung bases, worse on the right without pulmonary mass.   The pull-through is not distended. There is no bulky adenopathy given   limitations of a noncontrast exam. The airways are clear. There is mild   cardiomegaly without pericardial effusion. There is a small volume of   inspissated or aspirated secretions in the trachea.    The liver, gallbladder, spleen and adrenals are grossly normal given   limitations of a noncontrast exam. Pancreas is atrophic. There are small   cysts in both kidneys without hydronephrosis. There is no bowel   dilatation or ascites or adenopathy or focal inflammation of the   peritoneal fat. Urinary bladder is contracted. The left hip has been   replaced. There is mild to moderate generalized anasarca.    Chronic compression fractures are noted without evidence of osseous   metastasis.    IMPRESSION:  Stable posttreatmentfindings    MARCELINA STAPLES M.D., ATTENDING RADIOLOGIST  This document has been electronically signed. Sep 25 2018  2:24PM    < end of copied text >            Care Discussed with Consultants/Other Providers:

## 2018-09-28 NOTE — PROGRESS NOTE ADULT - PROBLEM SELECTOR PROBLEM 1
SOB (shortness of breath)

## 2018-09-28 NOTE — PROGRESS NOTE ADULT - PROBLEM SELECTOR PLAN 8
dilantin level 23.4  restarted dilantin 100 mg bid as per neurology  check dilantin level daily for next three days

## 2018-09-28 NOTE — PROGRESS NOTE ADULT - PROVIDER SPECIALTY LIST ADULT
Hospitalist
Neurology
Pulmonology
Hospitalist

## 2018-09-28 NOTE — DISCHARGE NOTE ADULT - HOSPITAL COURSE
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 s/p recent D/C from  9/18/18 for COPD exacerbation. Medically optimized.  found with toxic dilantin levels.  Neurology consulted.  Dilantin level adjusted.  Pt clinically improved. Discharged with outpatient follow up. 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 s/p recent D/C from  9/18/18 for COPD exacerbation. Medically optimized.  found with toxic dilantin levels.  Neurology consulted.  Dilantin level adjusted.  Pt clinically improved. Discharged with outpatient follow up.  Patient to have dilantin level checked at Rehab.

## 2018-09-28 NOTE — DISCHARGE NOTE ADULT - CARE PROVIDER_API CALL
Declan Ashby), Critical Care Medicine  [address]  [address]  [place] [address]  Phone: [phone]  Fax: [phone]    Juan Ramon Finney), Neurology  [address] [facility]  [address]  [place], [place] [id number]  Phone: [phone]  Fax: [phone]

## 2018-09-28 NOTE — PROGRESS NOTE ADULT - SUBJECTIVE AND OBJECTIVE BOX
Follow-up Pulm Progress Note    Declan Ashby MD  [phone]    No new respiratory events overnight.  Denies SOB/CP. Feeling stronger    Medications:  Vital Signs Last 24 Hrs  T(C): 36.3 (28 Sep 2018 05:52), Max: 36.6 (27 Sep 2018 20:40)  T(F): 97.3 (28 Sep 2018 05:52), Max: 97.8 (27 Sep 2018 20:40)  HR: 80 (28 Sep 2018 15:15) (68 - 88)  BP: 103/85 (28 Sep 2018 05:52) (103/85 - 104/66)  BP(mean): --  RR: 14 (28 Sep 2018 05:52) (14 - 14)  SpO2: 94% (28 Sep 2018 15:15) (92% - 99%)          09-27 @ 07:01  -  09-28 @ 07:00  --------------------------------------------------------  IN: 960 mL / OUT: 1000 mL / NET: -40 mL        LABS:                        8.6    3.6   )-----------( 120      ( 27 Sep 2018 06:30 )             28.3     09-27    138  |  101  |  23  ----------------------------<  68<L>  4.2   |  36<H>  |  0.71    Ca    9.3      27 Sep 2018 06:30                CULTURES:        Physical Examination:  PULM: Clear to auscultation bilaterally, no significant sputum production  CVS: Regular rate and rhythm, no murmurs, rubs, or gallops  ABD: Soft, non-tender  EXT:  No clubbing, cyanosis, or edema

## 2018-09-29 LAB — PHENYTOIN FREE SERPL-MCNC: 3 MG/L — HIGH (ref 1–2)

## 2018-10-03 ENCOUNTER — APPOINTMENT (OUTPATIENT)
Dept: SURGERY | Facility: ASSISTED LIVING FACILITY | Age: 83
End: 2018-10-03
Payer: MEDICARE

## 2018-10-03 PROCEDURE — 99304 1ST NF CARE SF/LOW MDM 25: CPT

## 2018-10-05 ENCOUNTER — APPOINTMENT (OUTPATIENT)
Dept: PULMONOLOGY | Facility: CLINIC | Age: 83
End: 2018-10-05

## 2018-10-09 ENCOUNTER — MEDICATION RENEWAL (OUTPATIENT)
Age: 83
End: 2018-10-09

## 2018-10-09 ENCOUNTER — RX RENEWAL (OUTPATIENT)
Age: 83
End: 2018-10-09

## 2018-10-12 ENCOUNTER — APPOINTMENT (OUTPATIENT)
Dept: PULMONOLOGY | Facility: CLINIC | Age: 83
End: 2018-10-12
Payer: MEDICARE

## 2018-10-12 VITALS
DIASTOLIC BLOOD PRESSURE: 58 MMHG | BODY MASS INDEX: 21.26 KG/M2 | HEIGHT: 63 IN | HEART RATE: 62 BPM | SYSTOLIC BLOOD PRESSURE: 110 MMHG | OXYGEN SATURATION: 92 % | RESPIRATION RATE: 16 BRPM | WEIGHT: 120 LBS

## 2018-10-12 DIAGNOSIS — G40.909 EPILEPSY, UNSPECIFIED, NOT INTRACTABLE, W/OUT STATUS EPILEPTICUS: ICD-10-CM

## 2018-10-12 DIAGNOSIS — F41.9 ANXIETY DISORDER, UNSPECIFIED: ICD-10-CM

## 2018-10-12 PROCEDURE — 99214 OFFICE O/P EST MOD 30 MIN: CPT

## 2018-10-12 RX ORDER — TIOTROPIUM BROMIDE 18 UG/1
18 CAPSULE ORAL; RESPIRATORY (INHALATION)
Qty: 30 | Refills: 0 | Status: DISCONTINUED | COMMUNITY
Start: 2018-05-03 | End: 2018-10-12

## 2018-10-24 PROCEDURE — 85730 THROMBOPLASTIN TIME PARTIAL: CPT

## 2018-10-24 PROCEDURE — 83735 ASSAY OF MAGNESIUM: CPT

## 2018-10-24 PROCEDURE — 71045 X-RAY EXAM CHEST 1 VIEW: CPT

## 2018-10-24 PROCEDURE — 84484 ASSAY OF TROPONIN QUANT: CPT

## 2018-10-24 PROCEDURE — 80048 BASIC METABOLIC PNL TOTAL CA: CPT

## 2018-10-24 PROCEDURE — 82553 CREATINE MB FRACTION: CPT

## 2018-10-24 PROCEDURE — 93306 TTE W/DOPPLER COMPLETE: CPT

## 2018-10-24 PROCEDURE — 82803 BLOOD GASES ANY COMBINATION: CPT

## 2018-10-24 PROCEDURE — 82962 GLUCOSE BLOOD TEST: CPT

## 2018-10-24 PROCEDURE — 70450 CT HEAD/BRAIN W/O DYE: CPT

## 2018-10-24 PROCEDURE — 74176 CT ABD & PELVIS W/O CONTRAST: CPT

## 2018-10-24 PROCEDURE — 71250 CT THORAX DX C-: CPT

## 2018-10-24 PROCEDURE — 94640 AIRWAY INHALATION TREATMENT: CPT

## 2018-10-24 PROCEDURE — 96365 THER/PROPH/DIAG IV INF INIT: CPT

## 2018-10-24 PROCEDURE — 99284 EMERGENCY DEPT VISIT MOD MDM: CPT | Mod: 25

## 2018-10-24 PROCEDURE — 93005 ELECTROCARDIOGRAM TRACING: CPT

## 2018-10-24 PROCEDURE — 85027 COMPLETE CBC AUTOMATED: CPT

## 2018-10-24 PROCEDURE — 85610 PROTHROMBIN TIME: CPT

## 2018-10-24 PROCEDURE — 90686 IIV4 VACC NO PRSV 0.5 ML IM: CPT

## 2018-10-24 PROCEDURE — G0378: CPT

## 2018-10-24 PROCEDURE — 36600 WITHDRAWAL OF ARTERIAL BLOOD: CPT

## 2018-10-24 PROCEDURE — 83880 ASSAY OF NATRIURETIC PEPTIDE: CPT

## 2018-10-24 PROCEDURE — 97161 PT EVAL LOW COMPLEX 20 MIN: CPT

## 2018-10-24 PROCEDURE — 80185 ASSAY OF PHENYTOIN TOTAL: CPT

## 2018-10-24 PROCEDURE — 96374 THER/PROPH/DIAG INJ IV PUSH: CPT

## 2018-10-24 PROCEDURE — 93971 EXTREMITY STUDY: CPT

## 2018-10-24 PROCEDURE — 80053 COMPREHEN METABOLIC PANEL: CPT

## 2018-10-24 PROCEDURE — 83036 HEMOGLOBIN GLYCOSYLATED A1C: CPT

## 2018-10-24 PROCEDURE — 94762 N-INVAS EAR/PLS OXIMTRY CONT: CPT

## 2018-10-24 PROCEDURE — 73030 X-RAY EXAM OF SHOULDER: CPT

## 2018-10-24 PROCEDURE — 84443 ASSAY THYROID STIM HORMONE: CPT

## 2018-10-24 PROCEDURE — 97116 GAIT TRAINING THERAPY: CPT

## 2018-10-24 PROCEDURE — 99285 EMERGENCY DEPT VISIT HI MDM: CPT | Mod: 25

## 2018-10-24 PROCEDURE — 73060 X-RAY EXAM OF HUMERUS: CPT

## 2018-10-24 PROCEDURE — 82550 ASSAY OF CK (CPK): CPT

## 2018-10-24 PROCEDURE — 80186 ASSAY OF PHENYTOIN FREE: CPT

## 2018-10-26 NOTE — PATIENT PROFILE ADULT. - NSCAFFEINETYPE_GEN_ALL_CORE_SD
The patient has been examined and the H&P has been reviewed:    I concur with the findings and no changes have occurred since H&P was written.    Anesthesia/Surgery risks, benefits and alternative options discussed and understood by patient/family.          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      coffee

## 2018-11-12 NOTE — ASU PREOP CHECKLIST - NS PREOP CHK CHLOROHEX WASH
SUBJECTIVE:   CC: Israel Zabala is an 40 year old woman who presents for preventive health visit.     Healthy Habits:    Do you get at least three servings of calcium containing foods daily (dairy, green leafy vegetables, etc.)? yes    Amount of exercise or daily activities, outside of work: some day(s) per week    Problems taking medications regularly No    Medication side effects: No    Have you had an eye exam in the past two years? yes    Do you see a dentist twice per year? yes    Do you have sleep apnea, excessive snoring or daytime drowsiness?no      Has concerns about her weight gain. 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 History of gastric bypass in 2003 with pre-gastric bypass weight at 304 lbs and she lost 120 lbs which she was able to maintain. She does acknowledge exercise intolerance given her lung surgery, but she has also resorted to increased oral intake as a coping strategy for stressors (youngest daughter with medical issues). She would like to seek help to address worsening weight gain.     She also complaints since Thursday of last week with onset of thick white vaginal discharge and itching. Thinks that it is yeast infection.     Today's PHQ-2 Score:   PHQ-2 ( 1999 [employer]) 11/12/2018 11/13/2017   Q1: Little interest or pleasure in doing things 0 0   Q2: Feeling down, depressed or hopeless 0 0   PHQ-2 Score 0 0       Abuse: Current or Past(Physical, Sexual or Emotional)- No  Do you feel safe in your environment - Yes    Social History   Substance Use Topics     Smoking status: Former Smoker     Packs/day: 0.10     Years: 8.00     Types: Cigarettes     Quit date: 9/26/2008     Smokeless tobacco: Never Used     Alcohol use Yes      Comment: occasional     If you drink alcohol do you typically have >3 drinks per day or >7 drinks per week? No                     Reviewed orders with patient.  Reviewed health  maintenance and updated orders accordingly - Yes  BP Readings from Last 3 Encounters:   11/12/18 (!) 124/96   02/26/18 130/80   11/13/17 140/81    Wt Readings from Last 3 Encounters:   11/12/18 277 lb (125.6 kg)   02/26/18 270 lb (122.5 kg)   11/13/17 268 lb (121.6 kg)                  Patient Active Problem List   Diagnosis     CARDIOVASCULAR SCREENING; LDL GOAL LESS THAN 160     Carcinoid tumor     Morbid obesity, unspecified obesity type (H)     Personal history of gastric bypass     Excessive bleeding in premenopausal period     White coat syndrome without diagnosis of hypertension     Nipple discharge in female     Past Surgical History:   Procedure Laterality Date     BRONCHOSCOPY FLEXIBLE  11/1/2013    related to carcinoid;  Flexible Bronchoscopy, Endobronchial Flexible Ultrasound;  Surgeon: Chris Sal MD;  Location: [facility] OR     C REMOVAL OF LUNG,BILOBECTOMY Right 11/22/2013    Carcinoid: Right Middle and Lower Lobe     ENDOBRONCHIAL ULTRASOUND FLEXIBLE  11/1/2013    Procedure: ENDOBRONCHIAL ULTRASOUND FLEXIBLE;;  Surgeon: Chris Sal MD;  Location: [facility] OR     GASTRIC BYPASS  2003     HERNIORRHAPHY INCISIONAL (LOCATION)  8/19/2011    Procedure:HERNIORRHAPHY INCISIONAL (LOCATION); Incisional Hernia Repair upper abdomen midline       TONSILLECTOMY  age 9       Social History   Substance Use Topics     Smoking status: Former Smoker     Packs/day: 0.10     Years: 8.00     Types: Cigarettes     Quit date: 9/26/2008     Smokeless tobacco: Never Used     Alcohol use Yes      Comment: occasional     Family History   Problem Relation Age of Onset     Breast Cancer Maternal Grandmother 65     Cancer Maternal Grandmother      skin     Diabetes Maternal Grandfather      HEART DISEASE Paternal Grandmother      MI     Cerebrovascular Disease Paternal Grandmother      HEART DISEASE Paternal Grandfather      MI     GASTROINTESTINAL DISEASE Brother      Colitis     Prostate Cancer Father       Cancer Mother      skin     Congenital Anomalies Daughter      imperforate anus         Current Outpatient Prescriptions   Medication Sig Dispense Refill     Cholecalciferol (VITAMIN D) 2000 UNITS tablet Take 1 tablet by mouth daily       ferrous sulfate (IRON) 325 (65 FE) MG tablet Take 1 tablet (325 mg) by mouth 2 times daily 60 tablet 2     fluconazole (DIFLUCAN) 150 MG tablet Take 1 tablet (150 mg) by mouth once for 1 dose 1 tablet 0     Multiple Vitamins-Minerals (MULTIVITAMIN PO)        VITAMIN B-12 PO 1 TABLET ORALLY DAILY(HOME MED)       No Known Allergies  Recent Labs   Lab Test  11/09/16   0811  10/29/14   1103 06/11/14 11/27/13 07/12/13   1255   12/06/10   1102   A1C  5.8  5.7   --    --    --    --    --    --    LDL  128*  144*   --    --    --    --    --   145*   HDL  56  61   --    --    --    --    --   54   TRIG  156*  141   --    --    --    --    --   129   ALT  23   --   15   --    --   17   --    --    CR  0.65   --   0.6  0.6   < >  0.54   < >   --    GFRESTIMATED  >90  Non  GFR Calc     --    --   >60   < >  >90   < >   --    GFRESTBLACK  >90   GFR Calc     --    --   >60   < >  >90   < >   --    POTASSIUM  4.2   --   4.9  5.0   < >  4.1   < >   --    TSH  1.88  2.09   --    --    < >   --    < >  2.20    < > = values in this interval not displayed.        Patient under age 50, mutual decision reflected in health maintenance.      Pertinent mammograms are reviewed under the imaging tab.  History of abnormal Pap smear: NO - age 30-65 PAP every 5 years with negative HPV co-testing recommended  PAP / HPV Latest Ref Rng & Units 9/14/2015 4/5/2012 9/26/2011   PAP - NIL NIL NIL   PAP DATE - [facility] - - - -   HPV 16 DNA NEG Negative - -   HPV 18 DNA NEG Negative - -   OTHER HR HPV NEG Negative - -     Reviewed and updated as needed this visit by clinical staff  Tobacco  Allergies  Meds  Med Hx  Surg Hx  Fam Hx  Soc Hx        Reviewed and updated as needed this  visit by Provider        Past Medical History:   Diagnosis Date     Abnormal glandular Papanicolaou smear of cervix     Negative colpo per patient     Chickenpox      Chronic maxillary sinusitis 3/31/2006     CTS (carpal tunnel syndrome) 3/6/2014     Situational anxiety 2012    Related to her cancer diagnosis      Past Surgical History:   Procedure Laterality Date     BRONCHOSCOPY FLEXIBLE  2013    related to carcinoid;  Flexible Bronchoscopy, Endobronchial Flexible Ultrasound;  Surgeon: Chris Sal MD;  Location: [facility]     C REMOVAL OF LUNG,BILOBECTOMY Right 2013    Carcinoid: Right Middle and Lower Lobe     ENDOBRONCHIAL ULTRASOUND FLEXIBLE  2013    Procedure: ENDOBRONCHIAL ULTRASOUND FLEXIBLE;;  Surgeon: Chris Sal MD;  Location: [facility] OR     GASTRIC BYPASS       HERNIORRHAPHY INCISIONAL (LOCATION)  2011    Procedure:HERNIORRHAPHY INCISIONAL (LOCATION); Incisional Hernia Repair upper abdomen midline       TONSILLECTOMY  age 9     Obstetric History       T2      L2     SAB0   TAB0   Ectopic0   Multiple0   Live Births2       # Outcome Date GA Lbr Jcarlos/2nd Weight Sex Delivery Anes PTL Lv   2 Term 10/31/12 38w6d 03:28 / 00:12 8 lb 4 oz (3.742 kg) F Vag-Spont INT  EAMON      Name: Summer      Complications: Vertebral abnormalities, anal atresia, cardiac abnormalities, tracheo-esophageal fistula, and limb defects (VACTEL) syndrome      Apgar1:  8                Apgar5: 9   1 Term 08/09/10 39w3d 08:29 6 lb 12 oz (3.062 kg) F  EPI N EAMON      Name:  SENDY Zabala      Apgar1:  7                Apgar5: 9          ROS:  CONSTITUTIONAL: NEGATIVE for fever, chills, change in weight  INTEGUMENTARU/SKIN: NEGATIVE for worrisome rashes, moles or lesions  EYES: NEGATIVE for vision changes or irritation  ENT: NEGATIVE for ear, mouth and throat problems  RESP: NEGATIVE for significant cough or SOB  BREAST: NEGATIVE for masses, tenderness or discharge  CV:  NEGATIVE for chest pain, palpitations or peripheral edema  GI: NEGATIVE for nausea, abdominal pain, heartburn, or change in bowel habits  : NEGATIVE for unusual urinary or vaginal symptoms. Periods are regular.  MUSCULOSKELETAL: NEGATIVE for significant arthralgias or myalgia  NEURO: NEGATIVE for weakness, dizziness or paresthesias  PSYCHIATRIC: NEGATIVE for changes in mood or affect    OBJECTIVE:   BP (!) 124/96 (BP Location: Right arm, Patient Position: Chair, Cuff Size: Adult Regular)  Pulse 83  Temp 98.1  F (36.7  C) (Tympanic)  Wt 277 lb (125.6 kg)  LMP 10/22/2018  Breastfeeding? No  BMI 50.26 kg/m2  EXAM:  GENERAL: healthy, alert and no distress  HENT: atraumatic, normocephalic  NECK: no adenopathy, no asymmetry, masses, or scars and thyroid normal to palpation  RESP: lungs clear to auscultation - no rales, rhonchi or wheezes  BREAST: normal without masses, tenderness or nipple discharge and no palpable axillary masses or adenopathy  CV: regular rate and rhythm, normal S1 S2, no S3 or S4, no murmur, click or rub, no peripheral edema and peripheral pulses strong  ABDOMEN: soft, nontender, no hepatosplenomegaly, no masses and bowel sounds normal  PELVIC: Normal external female genitalia.  No external lesions, normal hair distribution, no adenopathy. Speculum exam reveals vaginal epithelium well rugated with no abnormal discharge. Cervix appears smooth, pink, with no visible lesions. Bimanual exam reveals normal size uterus, anteverted, non-tender, and mobile. No adnexal masses or tenderness. No cervical motion tenderness.   MS: no gross musculoskeletal defects noted, no edema  SKIN: no suspicious lesions or rashes  NEURO: Normal strength and tone, mentation intact and speech normal  PSYCH: mentation appears normal, affect normal/bright    Diagnostic Test Results:  none     ASSESSMENT/PLAN:   1. Well female exam with routine gynecological exam  - Lipid panel reflex to direct LDL Fasting  - Pap imaged thin  "layer screen with HPV - recommended age 30 - 65 years (select HPV order below)  - Glucose    2. Weight gain  - BARIATRIC ADULT REFERRAL    3. Yeast infection of the vagina  - Wet prep  - fluconazole (DIFLUCAN) 150 MG tablet; Take 1 tablet (150 mg) by mouth once for 1 dose  Dispense: 1 tablet; Refill: 0    COUNSELING:   Reviewed preventive health counseling, as reflected in patient instructions       Regular exercise       Healthy diet/nutrition    BP Readings from Last 1 Encounters:   11/12/18 (!) 124/96     Estimated body mass index is 50.26 kg/(m^2) as calculated from the following:    Height as of 2/26/18: 5' 2.25\" (1.581 m).    Weight as of this encounter: 277 lb (125.6 kg).           reports that she quit smoking about 10 years ago. Her smoking use included Cigarettes. She has a 0.80 pack-year smoking history. She has never used smokeless tobacco.      Counseling Resources:  ATP IV Guidelines  Pooled Cohorts Equation Calculator  Breast Cancer Risk Calculator  FRAX Risk Assessment  ICSI Preventive Guidelines  Dietary Guidelines for Americans, 2010  USDA's MyPlate  ASA Prophylaxis  Lung CA Screening    Devante Painting MD  [facility]  " at home:

## 2018-11-16 ENCOUNTER — APPOINTMENT (OUTPATIENT)
Dept: PULMONOLOGY | Facility: CLINIC | Age: 83
End: 2018-11-16
Payer: MEDICARE

## 2018-11-16 VITALS
HEIGHT: 63 IN | RESPIRATION RATE: 16 BRPM | DIASTOLIC BLOOD PRESSURE: 60 MMHG | HEART RATE: 67 BPM | OXYGEN SATURATION: 97 % | WEIGHT: 124 LBS | BODY MASS INDEX: 21.97 KG/M2 | SYSTOLIC BLOOD PRESSURE: 110 MMHG

## 2018-11-16 DIAGNOSIS — Z85.9 PERSONAL HISTORY OF MALIGNANT NEOPLASM, UNSPECIFIED: ICD-10-CM

## 2018-11-16 PROCEDURE — 99213 OFFICE O/P EST LOW 20 MIN: CPT

## 2018-11-27 ENCOUNTER — MOBILE ON CALL (OUTPATIENT)
Age: 83
End: 2018-11-27

## 2018-12-11 ENCOUNTER — RX RENEWAL (OUTPATIENT)
Age: 83
End: 2018-12-11

## 2018-12-17 ENCOUNTER — APPOINTMENT (OUTPATIENT)
Dept: PULMONOLOGY | Facility: CLINIC | Age: 83
End: 2018-12-17
Payer: MEDICARE

## 2018-12-17 VITALS
SYSTOLIC BLOOD PRESSURE: 134 MMHG | BODY MASS INDEX: 21.44 KG/M2 | HEIGHT: 63 IN | WEIGHT: 121 LBS | DIASTOLIC BLOOD PRESSURE: 60 MMHG

## 2018-12-17 PROCEDURE — 99213 OFFICE O/P EST LOW 20 MIN: CPT

## 2018-12-25 ENCOUNTER — INPATIENT (INPATIENT)
Facility: HOSPITAL | Age: 83
LOS: 2 days | Discharge: HOME | DRG: 189 | End: 2018-12-28
Attending: STUDENT IN AN ORGANIZED HEALTH CARE EDUCATION/TRAINING PROGRAM | Admitting: INTERNAL MEDICINE
Payer: MEDICARE

## 2018-12-25 VITALS
HEIGHT: 63 IN | SYSTOLIC BLOOD PRESSURE: 101 MMHG | OXYGEN SATURATION: 97 % | TEMPERATURE: 98 F | RESPIRATION RATE: 20 BRPM | WEIGHT: 117.07 LBS | HEART RATE: 86 BPM | DIASTOLIC BLOOD PRESSURE: 52 MMHG

## 2018-12-25 DIAGNOSIS — K91.89 OTHER POSTPROCEDURAL COMPLICATIONS AND DISORDERS OF DIGESTIVE SYSTEM: Chronic | ICD-10-CM

## 2018-12-25 DIAGNOSIS — M12.9 ARTHROPATHY, UNSPECIFIED: Chronic | ICD-10-CM

## 2018-12-25 DIAGNOSIS — Z96.649 PRESENCE OF UNSPECIFIED ARTIFICIAL HIP JOINT: Chronic | ICD-10-CM

## 2018-12-25 DIAGNOSIS — Z90.89 ACQUIRED ABSENCE OF OTHER ORGANS: Chronic | ICD-10-CM

## 2018-12-25 DIAGNOSIS — Z95.5 PRESENCE OF CORONARY ANGIOPLASTY IMPLANT AND GRAFT: Chronic | ICD-10-CM

## 2018-12-25 DIAGNOSIS — J44.1 CHRONIC OBSTRUCTIVE PULMONARY DISEASE WITH (ACUTE) EXACERBATION: ICD-10-CM

## 2018-12-25 DIAGNOSIS — Z98.890 OTHER SPECIFIED POSTPROCEDURAL STATES: Chronic | ICD-10-CM

## 2018-12-25 LAB
ALBUMIN SERPL ELPH-MCNC: 2.5 G/DL — LOW (ref 3.3–5)
ALP SERPL-CCNC: 177 U/L — HIGH (ref 40–120)
ALT FLD-CCNC: 54 U/L DA — HIGH (ref 10–45)
ANION GAP SERPL CALC-SCNC: 1 MMOL/L — LOW (ref 5–17)
APTT BLD: 34.3 SEC — SIGNIFICANT CHANGE UP (ref 27.5–36.3)
AST SERPL-CCNC: 55 U/L — HIGH (ref 10–40)
BILIRUB SERPL-MCNC: 0.3 MG/DL — SIGNIFICANT CHANGE UP (ref 0.2–1.2)
BUN SERPL-MCNC: 25 MG/DL — HIGH (ref 7–23)
CALCIUM SERPL-MCNC: 9.5 MG/DL — SIGNIFICANT CHANGE UP (ref 8.4–10.5)
CHLORIDE SERPL-SCNC: 108 MMOL/L — SIGNIFICANT CHANGE UP (ref 96–108)
CO2 BLDA-SCNC: 9 MMOL/L — LOW (ref 22–30)
CO2 SERPL-SCNC: 38 MMOL/L — HIGH (ref 22–31)
CREAT SERPL-MCNC: 0.74 MG/DL — SIGNIFICANT CHANGE UP (ref 0.5–1.3)
GAS PNL BLDA: SIGNIFICANT CHANGE UP
GLUCOSE SERPL-MCNC: 108 MG/DL — HIGH (ref 70–99)
HCT VFR BLD CALC: 35.8 % — LOW (ref 39–50)
HGB BLD-MCNC: 10.7 G/DL — LOW (ref 13–17)
HOROWITZ INDEX BLDA+IHG-RTO: SIGNIFICANT CHANGE UP
INR BLD: 1.01 RATIO — SIGNIFICANT CHANGE UP (ref 0.88–1.16)
MCHC RBC-ENTMCNC: 29.8 GM/DL — LOW (ref 32–36)
MCHC RBC-ENTMCNC: 30 PG — SIGNIFICANT CHANGE UP (ref 27–34)
MCV RBC AUTO: 100.5 FL — HIGH (ref 80–100)
NT-PROBNP SERPL-SCNC: HIGH PG/ML (ref 0–300)
PCO2 BLDA: >103 MMHG — SIGNIFICANT CHANGE UP (ref 32–46)
PH BLDA: 7.11 — CRITICAL LOW (ref 7.35–7.45)
PHENYTOIN FREE SERPL-MCNC: 6.2 UG/ML — LOW (ref 10–20)
PLATELET # BLD AUTO: 152 K/UL — SIGNIFICANT CHANGE UP (ref 150–400)
PO2 BLDA: 256 MMHG — HIGH (ref 74–108)
POTASSIUM SERPL-MCNC: 4.7 MMOL/L — SIGNIFICANT CHANGE UP (ref 3.5–5.3)
POTASSIUM SERPL-SCNC: 4.7 MMOL/L — SIGNIFICANT CHANGE UP (ref 3.5–5.3)
PROT SERPL-MCNC: 7.2 G/DL — SIGNIFICANT CHANGE UP (ref 6–8.3)
PROTHROM AB SERPL-ACNC: 11.3 SEC — SIGNIFICANT CHANGE UP (ref 10–12.9)
RBC # BLD: 3.57 M/UL — LOW (ref 4.2–5.8)
RBC # FLD: 18.2 % — HIGH (ref 10.3–14.5)
SAO2 % BLDA: 99 % — HIGH (ref 92–96)
SODIUM SERPL-SCNC: 147 MMOL/L — HIGH (ref 135–145)
TROPONIN I SERPL-MCNC: 0.05 NG/ML — SIGNIFICANT CHANGE UP (ref 0.02–0.06)
WBC # BLD: 8.2 K/UL — SIGNIFICANT CHANGE UP (ref 3.8–10.5)
WBC # FLD AUTO: 8.2 K/UL — SIGNIFICANT CHANGE UP (ref 3.8–10.5)

## 2018-12-25 PROCEDURE — 99291 CRITICAL CARE FIRST HOUR: CPT

## 2018-12-25 PROCEDURE — 93010 ELECTROCARDIOGRAM REPORT: CPT

## 2018-12-25 PROCEDURE — 71045 X-RAY EXAM CHEST 1 VIEW: CPT | Mod: 26

## 2018-12-25 RX ORDER — ALPRAZOLAM 0.25 MG
0.25 TABLET ORAL
Qty: 0 | Refills: 0 | Status: DISCONTINUED | OUTPATIENT
Start: 2018-12-25 | End: 2018-12-26

## 2018-12-25 RX ORDER — AMITRIPTYLINE HCL 25 MG
1 TABLET ORAL
Qty: 0 | Refills: 0 | COMMUNITY

## 2018-12-25 RX ORDER — CLOPIDOGREL BISULFATE 75 MG/1
75 TABLET, FILM COATED ORAL DAILY
Qty: 0 | Refills: 0 | Status: DISCONTINUED | OUTPATIENT
Start: 2018-12-25 | End: 2018-12-28

## 2018-12-25 RX ORDER — AZITHROMYCIN 500 MG/1
500 TABLET, FILM COATED ORAL EVERY 24 HOURS
Qty: 0 | Refills: 0 | Status: DISCONTINUED | OUTPATIENT
Start: 2018-12-25 | End: 2018-12-28

## 2018-12-25 RX ORDER — ATORVASTATIN CALCIUM 80 MG/1
20 TABLET, FILM COATED ORAL AT BEDTIME
Qty: 0 | Refills: 0 | Status: DISCONTINUED | OUTPATIENT
Start: 2018-12-25 | End: 2018-12-28

## 2018-12-25 RX ORDER — PANTOPRAZOLE SODIUM 20 MG/1
40 TABLET, DELAYED RELEASE ORAL
Qty: 0 | Refills: 0 | Status: DISCONTINUED | OUTPATIENT
Start: 2018-12-25 | End: 2018-12-28

## 2018-12-25 RX ORDER — IPRATROPIUM/ALBUTEROL SULFATE 18-103MCG
3 AEROSOL WITH ADAPTER (GRAM) INHALATION EVERY 6 HOURS
Qty: 0 | Refills: 0 | Status: DISCONTINUED | OUTPATIENT
Start: 2018-12-25 | End: 2018-12-28

## 2018-12-25 RX ORDER — IPRATROPIUM/ALBUTEROL SULFATE 18-103MCG
3 AEROSOL WITH ADAPTER (GRAM) INHALATION
Qty: 0 | Refills: 0 | Status: DISCONTINUED | OUTPATIENT
Start: 2018-12-25 | End: 2018-12-28

## 2018-12-25 RX ORDER — LEVOTHYROXINE SODIUM 125 MCG
50 TABLET ORAL DAILY
Qty: 0 | Refills: 0 | Status: DISCONTINUED | OUTPATIENT
Start: 2018-12-25 | End: 2018-12-28

## 2018-12-25 RX ORDER — MONTELUKAST 4 MG/1
10 TABLET, CHEWABLE ORAL DAILY
Qty: 0 | Refills: 0 | Status: DISCONTINUED | OUTPATIENT
Start: 2018-12-25 | End: 2018-12-28

## 2018-12-25 RX ORDER — PYRIDOXINE HCL (VITAMIN B6) 100 MG
0 TABLET ORAL
Qty: 0 | Refills: 0 | COMMUNITY

## 2018-12-25 RX ORDER — SIMVASTATIN 20 MG/1
40 TABLET, FILM COATED ORAL AT BEDTIME
Qty: 0 | Refills: 0 | Status: DISCONTINUED | OUTPATIENT
Start: 2018-12-25 | End: 2018-12-25

## 2018-12-25 RX ORDER — PREGABALIN 225 MG/1
0 CAPSULE ORAL
Qty: 0 | Refills: 0 | COMMUNITY

## 2018-12-25 RX ORDER — TIOTROPIUM BROMIDE 18 UG/1
2 CAPSULE ORAL; RESPIRATORY (INHALATION)
Qty: 0 | Refills: 0 | COMMUNITY

## 2018-12-25 RX ORDER — HEPARIN SODIUM 5000 [USP'U]/ML
5000 INJECTION INTRAVENOUS; SUBCUTANEOUS EVERY 8 HOURS
Qty: 0 | Refills: 0 | Status: DISCONTINUED | OUTPATIENT
Start: 2018-12-25 | End: 2018-12-28

## 2018-12-25 RX ORDER — DEXAMETHASONE 0.5 MG/5ML
10 ELIXIR ORAL ONCE
Qty: 0 | Refills: 0 | Status: COMPLETED | OUTPATIENT
Start: 2018-12-25 | End: 2018-12-25

## 2018-12-25 RX ORDER — TAMSULOSIN HYDROCHLORIDE 0.4 MG/1
0.4 CAPSULE ORAL AT BEDTIME
Qty: 0 | Refills: 0 | Status: DISCONTINUED | OUTPATIENT
Start: 2018-12-25 | End: 2018-12-28

## 2018-12-25 RX ORDER — FINASTERIDE 5 MG/1
5 TABLET, FILM COATED ORAL DAILY
Qty: 0 | Refills: 0 | Status: DISCONTINUED | OUTPATIENT
Start: 2018-12-25 | End: 2018-12-28

## 2018-12-25 RX ADMIN — AZITHROMYCIN 255 MILLIGRAM(S): 500 TABLET, FILM COATED ORAL at 21:20

## 2018-12-25 RX ADMIN — TAMSULOSIN HYDROCHLORIDE 0.4 MILLIGRAM(S): 0.4 CAPSULE ORAL at 21:27

## 2018-12-25 RX ADMIN — ATORVASTATIN CALCIUM 20 MILLIGRAM(S): 80 TABLET, FILM COATED ORAL at 21:20

## 2018-12-25 RX ADMIN — Medication 105 MILLIGRAM(S): at 18:52

## 2018-12-25 RX ADMIN — Medication 120 MILLIGRAM(S): at 17:12

## 2018-12-25 RX ADMIN — HEPARIN SODIUM 5000 UNIT(S): 5000 INJECTION INTRAVENOUS; SUBCUTANEOUS at 21:19

## 2018-12-25 RX ADMIN — Medication 40 MILLIGRAM(S): at 21:19

## 2018-12-25 RX ADMIN — Medication 0.25 MILLIGRAM(S): at 21:30

## 2018-12-25 RX ADMIN — Medication 3 MILLILITER(S): at 22:17

## 2018-12-25 NOTE — ED PROVIDER NOTE - PMH
Anxiety    Asthma    BCC (basal cell carcinoma)  skin  BPH (benign prostatic hyperplasia)    Cerebrovascular accident (CVA)    Chronic allergic rhinitis    Coronary artery disease  s/p 3 stents on June 30, 2017 at [place] Dr. Lavelle Reid  Esophagus cancer    Former smoker, stopped smoking in distant past    HTN (hypertension)    Hyperlipidemia    OA (osteoarthritis) of knee  right  Rheumatoid arthritis    Seizure disorder  1 episode approximately 15 yrs ago Advanced COPD    Anxiety    Asthma    BCC (basal cell carcinoma)  skin  BPH (benign prostatic hyperplasia)    Cerebrovascular accident (CVA)    Chronic allergic rhinitis    Coronary artery disease  s/p 3 stents on June 30, 2017 at [place] Dr. Lavelle Reid  Esophagus cancer    Former smoker, stopped smoking in distant past    HTN (hypertension)    Hyperlipidemia    OA (osteoarthritis) of knee  right  Rheumatoid arthritis    Seizure disorder  1 episode approximately 15 yrs ago

## 2018-12-25 NOTE — ED PROVIDER NOTE - NS ED ROS FT
Constitutional: (-) fever  (-)chills  (-)sweats  Eyes/ENT: (-) blurry vision, (-) epistaxis  (-)rhinorrhea   (-) sore throat    Cardiovascular: (-) chest pain, (-) palpitations (-) edema   Respiratory: (-) cough, (+) shortness of breath   Gastrointestinal: (-)nausea  (-)vomiting, (-) diarrhea  (-) abdominal pain   :  (-)dysuria, (-)frequency, (-)urgency, (-)hematuria  Musculoskeletal: (-) neck pain, (-) back pain, (-) joint pain  Integumentary: (-) rash, (-) edema  Neurological: (-) headache, (+) altered mental status  (-)LOC

## 2018-12-25 NOTE — ED ADULT NURSE NOTE - PMH
Anxiety    Asthma    BCC (basal cell carcinoma)  skin  BPH (benign prostatic hyperplasia)    Cerebrovascular accident (CVA)    Chronic allergic rhinitis    Coronary artery disease  s/p 3 stents on June 30, 2017 at [place] Dr. Lavelle Reid  Esophagus cancer    Former smoker, stopped smoking in distant past    HTN (hypertension)    Hyperlipidemia    OA (osteoarthritis) of knee  right  Rheumatoid arthritis    Seizure disorder  1 episode approximately 15 yrs ago

## 2018-12-25 NOTE — H&P ADULT - NSHPPHYSICALEXAM_GEN_ALL_CORE
PHYSICAL EXAM:  Constitutional: lethargic  Eyes: Pupils equal, round and reactive light  ENMT: Moist oral mucosa   Neck: Supple, no lymphadenopathy  Respiratory: Poor air entry bilaterally, no wheezing or ronchi  Cardiovascular: S1, S2 no murmurs appreciated  Gastrointestinal: Soft,nontender, non distended   Extremities: No digital clubbing or cyanosis   Neurological: Lethargic, Moves all four extremities to noxious stimuli  Skin: Dry no, no cyanosis   Musculoskeletal: Thin frail man, with low muscle mass   Psychiatric: Calm

## 2018-12-25 NOTE — H&P ADULT - ASSESSMENT
83M hx of CAD s/p stent 6/2017, Diastolic heart failure, CVA/TIA with no residual deficits, sz d/o, RA on Pred 5mg maintenance,  COPD, hx of esophageal ca s/p chemo/RT and esophagectomy complicated with strictures requiring dilatation brought to the hospital for increased lethargy. Now with hypercapnic respiratory failure likely due to COPD exacerbation. Appear euvolemic. No obvious source of infection identified. Appears extremely frail and debilitated. As per daughter at bedside, the past year patient has been functionally declining with multiple admission to the hospital, one issue after another.     Assessment:  1. Acute on chronic hypercapnic respiratory failure  2. COPD exacerbation  3. Diastolic heart failure  4. CVA   5. Esophageal cancer  6. Hypernatremia   7. Rhematoid arthritis   8. BPH    - Admit patient to ICU   - NIV support for the patient  - Will repeat ABG in AM  - IV steroids and Nebs RTC  - Azithromycin for 5 days  - Cont. Plavix and home seizure medications  - Noted level of dilantin   - Cont. BPH meds  - keep NPO while acutely on NIV  - EF on previous >40% so ACEI and ARBS are optional at this time  - No evidence of CHF exacerbation on exam   - GI and DVT prophylaxis  - GOC discussed with wife at bedside. No aggressive measures. DNR/DNI code status, no central line or vasopressors. Ok with NIV support for now.

## 2018-12-25 NOTE — H&P ADULT - HISTORY OF PRESENT ILLNESS
83M hx of CAD s/p stent 6/2017, CVA/TIA with no residual deficits, sz d/o, RA on Pred 5mg maintenance,  COPD, diastolic CHF, hx of esophageal ca s/p chemo/RT and esophagectomy complicated with strictures requiring dilatation brought to the hospital for increased lethargy. As per wife patient was awake but kept falling asleep. Denies any fevers or chills reported in the ED. No reported headache, focal weakness, chest pain, palpitations, shortness of breath, abdominal pain, nausea, vomiting, diarrhea or dysuria.    In the ED patient found to be in hypercapnic likely secondary to COPD exacerbation. Patient was placed on AVAPS. Appears lethargic at time of evaluation. Wife at bedside, expressed limited medical interventions.

## 2018-12-25 NOTE — H&P ADULT - PMH
Advanced COPD    Anxiety    Asthma    BCC (basal cell carcinoma)  skin  BPH (benign prostatic hyperplasia)    Cerebrovascular accident (CVA)    Chronic allergic rhinitis    Coronary artery disease  s/p 3 stents on June 30, 2017 at [place] Dr. Lavelle Reid  Esophagus cancer    Former smoker, stopped smoking in distant past    HTN (hypertension)    Hyperlipidemia    OA (osteoarthritis) of knee  right  Rheumatoid arthritis    Seizure disorder  1 episode approximately 15 yrs ago

## 2018-12-25 NOTE — ED PROVIDER NOTE - PHYSICAL EXAMINATION
General:     NAD, well-nourished, well-appearing  Head:     NC/AT, EOMI, oral mucosa moist  Neck:     trachea midline  Lungs:     decreased air entry bilaterally   CVS:     S1S2, RRR, no m/g/r  Abd:     +BS, s/nt/nd, no organomegaly  Ext:    2+ radial and pedal pulses, no c/c/e  Neuro: lethargic responds verbally by opening eyes minimally verbal, move all extremeties equally

## 2018-12-26 LAB
ANION GAP SERPL CALC-SCNC: 2 MMOL/L — LOW (ref 5–17)
BUN SERPL-MCNC: 24 MG/DL — HIGH (ref 7–23)
CALCIUM SERPL-MCNC: 9.2 MG/DL — SIGNIFICANT CHANGE UP (ref 8.4–10.5)
CHLORIDE SERPL-SCNC: 108 MMOL/L — SIGNIFICANT CHANGE UP (ref 96–108)
CO2 BLDA-SCNC: 34 MMOL/L — HIGH (ref 22–30)
CO2 SERPL-SCNC: 37 MMOL/L — HIGH (ref 22–31)
CREAT SERPL-MCNC: 0.81 MG/DL — SIGNIFICANT CHANGE UP (ref 0.5–1.3)
GAS PNL BLDA: SIGNIFICANT CHANGE UP
GLUCOSE SERPL-MCNC: 102 MG/DL — HIGH (ref 70–99)
HCT VFR BLD CALC: 30.7 % — LOW (ref 39–50)
HGB BLD-MCNC: 9.7 G/DL — LOW (ref 13–17)
HOROWITZ INDEX BLDA+IHG-RTO: SIGNIFICANT CHANGE UP
MAGNESIUM SERPL-MCNC: 1.8 MG/DL — SIGNIFICANT CHANGE UP (ref 1.6–2.6)
MCHC RBC-ENTMCNC: 31.2 PG — SIGNIFICANT CHANGE UP (ref 27–34)
MCHC RBC-ENTMCNC: 31.6 GM/DL — LOW (ref 32–36)
MCV RBC AUTO: 98.8 FL — SIGNIFICANT CHANGE UP (ref 80–100)
PCO2 BLDA: 49 MMHG — HIGH (ref 32–46)
PH BLDA: 7.44 — SIGNIFICANT CHANGE UP (ref 7.35–7.45)
PHOSPHATE SERPL-MCNC: 3.7 MG/DL — SIGNIFICANT CHANGE UP (ref 2.5–4.5)
PLATELET # BLD AUTO: 109 K/UL — LOW (ref 150–400)
PO2 BLDA: 199 MMHG — HIGH (ref 74–108)
POTASSIUM SERPL-MCNC: 4.5 MMOL/L — SIGNIFICANT CHANGE UP (ref 3.5–5.3)
POTASSIUM SERPL-SCNC: 4.5 MMOL/L — SIGNIFICANT CHANGE UP (ref 3.5–5.3)
RBC # BLD: 3.11 M/UL — LOW (ref 4.2–5.8)
RBC # FLD: 18.1 % — HIGH (ref 10.3–14.5)
SAO2 % BLDA: 98 % — HIGH (ref 92–96)
SODIUM SERPL-SCNC: 147 MMOL/L — HIGH (ref 135–145)
WBC # BLD: 5.2 K/UL — SIGNIFICANT CHANGE UP (ref 3.8–10.5)
WBC # FLD AUTO: 5.2 K/UL — SIGNIFICANT CHANGE UP (ref 3.8–10.5)

## 2018-12-26 PROCEDURE — 90792 PSYCH DIAG EVAL W/MED SRVCS: CPT

## 2018-12-26 PROCEDURE — 99233 SBSQ HOSP IP/OBS HIGH 50: CPT

## 2018-12-26 RX ORDER — ALPRAZOLAM 0.25 MG
0.25 TABLET ORAL EVERY 8 HOURS
Qty: 0 | Refills: 0 | Status: DISCONTINUED | OUTPATIENT
Start: 2018-12-26 | End: 2018-12-28

## 2018-12-26 RX ADMIN — MONTELUKAST 10 MILLIGRAM(S): 4 TABLET, CHEWABLE ORAL at 12:27

## 2018-12-26 RX ADMIN — Medication 40 MILLIGRAM(S): at 22:29

## 2018-12-26 RX ADMIN — Medication 100 MILLIGRAM(S): at 05:31

## 2018-12-26 RX ADMIN — CLOPIDOGREL BISULFATE 75 MILLIGRAM(S): 75 TABLET, FILM COATED ORAL at 12:27

## 2018-12-26 RX ADMIN — Medication 50 MICROGRAM(S): at 05:31

## 2018-12-26 RX ADMIN — ATORVASTATIN CALCIUM 20 MILLIGRAM(S): 80 TABLET, FILM COATED ORAL at 22:29

## 2018-12-26 RX ADMIN — HEPARIN SODIUM 5000 UNIT(S): 5000 INJECTION INTRAVENOUS; SUBCUTANEOUS at 14:09

## 2018-12-26 RX ADMIN — Medication 40 MILLIGRAM(S): at 05:32

## 2018-12-26 RX ADMIN — Medication 100 MILLIGRAM(S): at 18:14

## 2018-12-26 RX ADMIN — Medication 3 MILLILITER(S): at 10:16

## 2018-12-26 RX ADMIN — PANTOPRAZOLE SODIUM 40 MILLIGRAM(S): 20 TABLET, DELAYED RELEASE ORAL at 05:31

## 2018-12-26 RX ADMIN — HEPARIN SODIUM 5000 UNIT(S): 5000 INJECTION INTRAVENOUS; SUBCUTANEOUS at 22:30

## 2018-12-26 RX ADMIN — AZITHROMYCIN 255 MILLIGRAM(S): 500 TABLET, FILM COATED ORAL at 22:31

## 2018-12-26 RX ADMIN — Medication 3 MILLILITER(S): at 15:22

## 2018-12-26 RX ADMIN — Medication 40 MILLIGRAM(S): at 14:09

## 2018-12-26 RX ADMIN — Medication 3 MILLILITER(S): at 22:17

## 2018-12-26 RX ADMIN — HEPARIN SODIUM 5000 UNIT(S): 5000 INJECTION INTRAVENOUS; SUBCUTANEOUS at 05:31

## 2018-12-26 RX ADMIN — TAMSULOSIN HYDROCHLORIDE 0.4 MILLIGRAM(S): 0.4 CAPSULE ORAL at 22:29

## 2018-12-26 RX ADMIN — FINASTERIDE 5 MILLIGRAM(S): 5 TABLET, FILM COATED ORAL at 12:27

## 2018-12-26 RX ADMIN — Medication 3 MILLILITER(S): at 04:15

## 2018-12-26 NOTE — PROGRESS NOTE ADULT - SUBJECTIVE AND OBJECTIVE BOX
Follow-up Pulm Progress Note    Declan Ashby MD  [phone]    No new respiratory events overnight.  Denies SOB/CP. patient admitted with hypercapnic respiratory now resolved. . Of note patient has been on Xanax p.r.n.    Medications:  Vital Signs Last 24 Hrs  T(C): 36.3 (26 Dec 2018 07:00), Max: 36.3 (26 Dec 2018 07:00)  T(F): 97.4 (26 Dec 2018 07:00), Max: 97.4 (26 Dec 2018 07:00)  HR: 73 (26 Dec 2018 15:24) (43 - 79)  BP: 108/95 (26 Dec 2018 08:00) (90/60 - 123/80)  BP(mean): 101 (26 Dec 2018 08:00) (47 - 101)  RR: 26 (26 Dec 2018 09:00) (11 - 26)  SpO2: 98% (26 Dec 2018 15:24) (67% - 100%)    ABG - ( 26 Dec 2018 05:45 )  pH, Arterial: 7.44  pH, Blood: x     /  pCO2: 49    /  pO2: 199   / HCO3: x     / Base Excess: x     /  SaO2: 98                    12-25 @ 07:01  -  12-26 @ 07:00  --------------------------------------------------------  IN: 250 mL / OUT: 200 mL / NET: 50 mL        LABS:                        9.7    5.2   )-----------( 109      ( 26 Dec 2018 05:50 )             30.7     12-26    147<H>  |  108  |  24<H>  ----------------------------<  102<H>  4.5   |  37<H>  |  0.81    Ca    9.2      26 Dec 2018 05:50  Phos  3.7     12-26  Mg     1.8     12-26    TPro  7.2  /  Alb  2.5<L>  /  TBili  0.3  /  DBili  x   /  AST  55<H>  /  ALT  54<H>  /  AlkPhos  177<H>  12-25      PT/INR - ( 25 Dec 2018 16:00 )   PT: 11.3 sec;   INR: 1.01 ratio         PTT - ( 25 Dec 2018 16:00 )  PTT:34.3 sec    Serum Pro-Brain Natriuretic Peptide: 57417 pg/mL (12-25-18 @ 15:30)      CULTURES:        Physical Examination:  PULM: Clear to auscultation bilaterally, no significant sputum production  CVS: Regular rate and rhythm, no murmurs, rubs, or gallops  ABD: Soft, non-tender  EXT:  No clubbing, cyanosis, or edema    RADIOLOGY REVIEWED  CXR:    CT chest:    TTE:

## 2018-12-26 NOTE — SWALLOW BEDSIDE ASSESSMENT ADULT - COMMENTS
Pt and wife report occasional Pt and wife report occasional esophageal difficulties due to esophagectomy and report good awareness of proper management.

## 2018-12-26 NOTE — SWALLOW BEDSIDE ASSESSMENT ADULT - SLP PERTINENT HISTORY OF CURRENT PROBLEM
83M hx of CAD s/p stent 6/2017, CVA/TIA with no residual deficits, sz d/o, RA on Pred 5mg maintenance,  COPD, diastolic CHF, hx of esophageal ca s/p chemo/RT and esophagectomy complicated with strictures requiring dilatation brought to the hospital for increased lethargy. As per wife patient was awake but kept falling asleep. Denies any fevers or chills reported in the ED. No reported headache, focal weakness, chest pain, palpitations, shortness of breath, abdominal pain, nausea, vomiting, diarrhea or dysuria.

## 2018-12-26 NOTE — PROGRESS NOTE ADULT - SUBJECTIVE AND OBJECTIVE BOX
Follow-up Critical Care Progress Note  Chief Complaint : Chronic obstructive pulmonary disease with acute exacerbation    Patient admitted with hypercapnic respiratory failure yesterday. Treated with NIV. responded to steroids and nebs. Patient awake and alert this morning. Gas exchanged noted to be improved. Patient's wife at bedside. Eating diet at this time.     Allergies :penicillin (Rash)    PAST MEDICAL & SURGICAL HISTORY:  Advanced COPD  Coronary artery disease: s/p 3 stents on June 30, 2017 at [place] Dr. Lavelle Reid  OA (osteoarthritis) of knee: right  Former smoker, stopped smoking in distant past  Rheumatoid arthritis  Seizure disorder: 1 episode approximately 15 yrs ago  Asthma  Hyperlipidemia  BPH (benign prostatic hyperplasia)  HTN (hypertension)  Esophagus cancer  Chronic allergic rhinitis  BCC (basal cell carcinoma): skin  Cerebrovascular accident (CVA)  Anxiety  Anastomotic leak following esophagectomy: [facility] logan esophagectomy  History of tonsillectomy  H/O heart artery stent: June 30, 2017  Knee arthropathy: left  History of total hip replacement: left  History of hernia repair    Medications:  MEDICATIONS  (STANDING):  ALBUTerol/ipratropium for Nebulization 3 milliLiter(s) Nebulizer every 20 minutes  ALBUTerol/ipratropium for Nebulization 3 milliLiter(s) Nebulizer every 6 hours  atorvastatin 20 milliGRAM(s) Oral at bedtime  azithromycin  IVPB 500 milliGRAM(s) IV Intermittent every 24 hours  clopidogrel Tablet 75 milliGRAM(s) Oral daily  finasteride 5 milliGRAM(s) Oral daily  heparin  Injectable 5000 Unit(s) SubCutaneous every 8 hours  levothyroxine 50 MICROGram(s) Oral daily  methylPREDNISolone sodium succinate Injectable 40 milliGRAM(s) IV Push every 8 hours  montelukast 10 milliGRAM(s) Oral daily  pantoprazole    Tablet 40 milliGRAM(s) Oral before breakfast  phenytoin   Capsule 100 milliGRAM(s) Oral two times a day  tamsulosin 0.4 milliGRAM(s) Oral at bedtime    MEDICATIONS  (PRN):  ALPRAZolam 0.25 milliGRAM(s) Oral every 8 hours PRN anxiety    LABS:                      9.7    5.2   )-----------( 109      ( 26 Dec 2018 05:50 )             30.7     12-26  147<H>  |  108  |  24<H>  ----------------------------<  102<H>  4.5   |  37<H>  |  0.81    Ca    9.2      26 Dec 2018 05:50  Phos  3.7     12-26  Mg     1.8     12-26    TPro  7.2  /  Alb  2.5<L>  /  TBili  0.3  /  DBili  x   /  AST  55<H>  /  ALT  54<H>  /  AlkPhos  177<H>  12-25    CARDIAC MARKERS ( 25 Dec 2018 15:30 )  .054 ng/mL / x     / x     / x     / x        PT/INR - ( 25 Dec 2018 16:00 )   PT: 11.3 sec;   INR: 1.01 ratio       PTT - ( 25 Dec 2018 16:00 )  PTT:34.3 sec    Serum Pro-Brain Natriuretic Peptide: [id number] pg/mL (12-25-18 @ 15:30)    CULTURES: (if applicable)    ABG - ( 26 Dec 2018 05:45 )  pH, Arterial: 7.44  pH, Blood: x     /  pCO2: 49    /  pO2: 199   / HCO3: x     / Base Excess: x     /  SaO2: 98        VITALS:  T(C): 36.3 (12-26-18 @ 07:00), Max: 36.6 (12-25-18 @ 14:39)  T(F): 97.4 (12-26-18 @ 07:00), Max: 97.8 (12-25-18 @ 14:39)  HR: 78 (12-26-18 @ 09:00) (43 - 86)  BP: 108/95 (12-26-18 @ 08:00) (90/60 - 123/80)  BP(mean): 101 (12-26-18 @ 08:00) (47 - 101)  ABP: --  ABP(mean): --  RR: 26 (12-26-18 @ 09:00) (11 - 26)  SpO2: 67% (12-26-18 @ 09:00) (67% - 100%)  CVP(mm Hg): --  CVP(cm H2O): --    Ins and Outs     12-25-18 @ 07:01  -  12-26-18 @ 07:00  --------------------------------------------------------  IN: 250 mL / OUT: 200 mL / NET: 50 mL    Height (cm): 160.02 (12-25-18 @ 18:20)  Weight (kg): 53.1 (12-25-18 @ 18:20)  BMI (kg/m2): 20.7 (12-25-18 @ 18:20)    Physical Examination:  Constitutional: Awake and alert, not in distress at this time.   Eyes: Pupils equal, round and reactive light  ENMT: Moist oral mucosa   Neck: Supple, no lymphadenopathy  Respiratory: Poor air entry, but no wheezing appreciated   Cardiovascular: S1, S2 no murmurs appreciated  Gastrointestinal: Soft, nontender, non distended   Extremities: No digital clubbing or cyanosis   Neurological: Awake and oriented x 3, moving all four extremities   Skin: Dry no, no cyanosis   Musculoskeletal: Thin frail man, with low muscle mass   Psychiatric: Calm

## 2018-12-26 NOTE — SWALLOW BEDSIDE ASSESSMENT ADULT - SWALLOW EVAL: FEEDING ASSISTANCE
Normal vision: sees adequately in most situations; can see medication labels, newsprint
no assistance needed

## 2018-12-26 NOTE — PROGRESS NOTE ADULT - ASSESSMENT
chronic respiratory failure. We'll arrange iPAP ivaps therapy.  also psychiatry consult for anxiety  control

## 2018-12-26 NOTE — PHYSICAL THERAPY INITIAL EVALUATION ADULT - ADDITIONAL COMMENTS
Pt lives in an apartment with his wife, no steps.  Uses RW for ambulation and requires assistance with ADLs at home.
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
## 2018-12-26 NOTE — PROGRESS NOTE ADULT - ASSESSMENT
83M hx of CAD s/p stent 6/2017, Diastolic heart failure, CVA/TIA with no residual deficits, sz d/o, RA on Pred 5mg maintenance,  COPD, hx of esophageal ca s/p chemo/RT and esophagectomy complicated with strictures requiring dilatation brought to the hospital for increased lethargy. Admitted with hypercapnic respiratory failure likely due to COPD exacerbation. Clinically improved with NIV and steroids/nebs. Awake and alert at this time, oriented. Feels much better.     Assessment:  1. Acute on chronic hypercapnic respiratory failure  2. COPD exacerbation  3. Diastolic heart failure  4. CVA   5. Esophageal cancer  6. Hypernatremia   7. Rhematoid arthritis   8. BPH    - NIV support while sleeping and at night time as patient may have benefited from it  - Short course of steroids and nebs RTC  - Azithromycin for 5 days  - Cont. Plavix and home seizure medications  - Cont. Home dose of dilantin   - Reassess need for xanax   - Cont. BPH meds  - Start oral diet   - EF on previous >40% so ACEI and ARBS are optional at this time  - No evidence of CHF exacerbation on exam   - GI and DVT prophylaxis  - Palliative care consult   - Can be transferred out of CCU today to [facility]

## 2018-12-26 NOTE — SWALLOW BEDSIDE ASSESSMENT ADULT - SLP GENERAL OBSERVATIONS
Pt found OOB in chair and is A-Ox3 and appears to be a good historian Pt found OOB in chair with 4L O2 via NC, is A-Ox3 and demonstrates good awareness of medical history.

## 2018-12-26 NOTE — DIETITIAN INITIAL EVALUATION ADULT. - OTHER INFO
Pt. reports good appetite/ intake. Weight fluctuates slightly due to Ca. Denies N/V/ diarrhea. Last BM was 12/ 25. Skin intact. Tolerates DASH diet well. Understanmds the need for Na restriction./ diet well.
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
## 2018-12-27 DIAGNOSIS — G40.909 EPILEPSY, UNSPECIFIED, NOT INTRACTABLE, WITHOUT STATUS EPILEPTICUS: ICD-10-CM

## 2018-12-27 DIAGNOSIS — J44.1 CHRONIC OBSTRUCTIVE PULMONARY DISEASE WITH (ACUTE) EXACERBATION: ICD-10-CM

## 2018-12-27 DIAGNOSIS — N40.0 BENIGN PROSTATIC HYPERPLASIA WITHOUT LOWER URINARY TRACT SYMPTOMS: ICD-10-CM

## 2018-12-27 DIAGNOSIS — J96.22 ACUTE AND CHRONIC RESPIRATORY FAILURE WITH HYPERCAPNIA: ICD-10-CM

## 2018-12-27 DIAGNOSIS — C15.9 MALIGNANT NEOPLASM OF ESOPHAGUS, UNSPECIFIED: ICD-10-CM

## 2018-12-27 DIAGNOSIS — Z02.9 ENCOUNTER FOR ADMINISTRATIVE EXAMINATIONS, UNSPECIFIED: ICD-10-CM

## 2018-12-27 DIAGNOSIS — E87.0 HYPEROSMOLALITY AND HYPERNATREMIA: ICD-10-CM

## 2018-12-27 DIAGNOSIS — I50.32 CHRONIC DIASTOLIC (CONGESTIVE) HEART FAILURE: ICD-10-CM

## 2018-12-27 DIAGNOSIS — I63.9 CEREBRAL INFARCTION, UNSPECIFIED: ICD-10-CM

## 2018-12-27 LAB
ANION GAP SERPL CALC-SCNC: 1 MMOL/L — LOW (ref 5–17)
BUN SERPL-MCNC: 28 MG/DL — HIGH (ref 7–23)
CALCIUM SERPL-MCNC: 9.4 MG/DL — SIGNIFICANT CHANGE UP (ref 8.4–10.5)
CHLORIDE SERPL-SCNC: 104 MMOL/L — SIGNIFICANT CHANGE UP (ref 96–108)
CO2 SERPL-SCNC: 38 MMOL/L — HIGH (ref 22–31)
CREAT SERPL-MCNC: 0.96 MG/DL — SIGNIFICANT CHANGE UP (ref 0.5–1.3)
GLUCOSE SERPL-MCNC: 92 MG/DL — SIGNIFICANT CHANGE UP (ref 70–99)
HCT VFR BLD CALC: 33.4 % — LOW (ref 39–50)
HGB BLD-MCNC: 10.2 G/DL — LOW (ref 13–17)
MAGNESIUM SERPL-MCNC: 2.1 MG/DL — SIGNIFICANT CHANGE UP (ref 1.6–2.6)
MCHC RBC-ENTMCNC: 30.4 GM/DL — LOW (ref 32–36)
MCHC RBC-ENTMCNC: 30.7 PG — SIGNIFICANT CHANGE UP (ref 27–34)
MCV RBC AUTO: 101.1 FL — HIGH (ref 80–100)
PHOSPHATE SERPL-MCNC: 3.9 MG/DL — SIGNIFICANT CHANGE UP (ref 2.5–4.5)
PLATELET # BLD AUTO: 129 K/UL — LOW (ref 150–400)
POTASSIUM SERPL-MCNC: 4.7 MMOL/L — SIGNIFICANT CHANGE UP (ref 3.5–5.3)
POTASSIUM SERPL-SCNC: 4.7 MMOL/L — SIGNIFICANT CHANGE UP (ref 3.5–5.3)
RBC # BLD: 3.3 M/UL — LOW (ref 4.2–5.8)
RBC # FLD: 17.9 % — HIGH (ref 10.3–14.5)
SODIUM SERPL-SCNC: 143 MMOL/L — SIGNIFICANT CHANGE UP (ref 135–145)
WBC # BLD: 6.6 K/UL — SIGNIFICANT CHANGE UP (ref 3.8–10.5)
WBC # FLD AUTO: 6.6 K/UL — SIGNIFICANT CHANGE UP (ref 3.8–10.5)

## 2018-12-27 PROCEDURE — 99233 SBSQ HOSP IP/OBS HIGH 50: CPT

## 2018-12-27 PROCEDURE — 99232 SBSQ HOSP IP/OBS MODERATE 35: CPT

## 2018-12-27 RX ORDER — SODIUM CHLORIDE 0.65 %
1 AEROSOL, SPRAY (ML) NASAL EVERY 6 HOURS
Qty: 0 | Refills: 0 | Status: DISCONTINUED | OUTPATIENT
Start: 2018-12-27 | End: 2018-12-28

## 2018-12-27 RX ORDER — ESCITALOPRAM OXALATE 10 MG/1
5 TABLET, FILM COATED ORAL DAILY
Qty: 0 | Refills: 0 | Status: DISCONTINUED | OUTPATIENT
Start: 2018-12-27 | End: 2018-12-28

## 2018-12-27 RX ADMIN — Medication 3 MILLILITER(S): at 09:54

## 2018-12-27 RX ADMIN — Medication 100 MILLIGRAM(S): at 06:59

## 2018-12-27 RX ADMIN — MONTELUKAST 10 MILLIGRAM(S): 4 TABLET, CHEWABLE ORAL at 12:51

## 2018-12-27 RX ADMIN — AZITHROMYCIN 255 MILLIGRAM(S): 500 TABLET, FILM COATED ORAL at 21:17

## 2018-12-27 RX ADMIN — Medication 50 MICROGRAM(S): at 06:59

## 2018-12-27 RX ADMIN — PANTOPRAZOLE SODIUM 40 MILLIGRAM(S): 20 TABLET, DELAYED RELEASE ORAL at 06:59

## 2018-12-27 RX ADMIN — Medication 40 MILLIGRAM(S): at 06:59

## 2018-12-27 RX ADMIN — Medication 3 MILLILITER(S): at 04:47

## 2018-12-27 RX ADMIN — HEPARIN SODIUM 5000 UNIT(S): 5000 INJECTION INTRAVENOUS; SUBCUTANEOUS at 14:00

## 2018-12-27 RX ADMIN — HEPARIN SODIUM 5000 UNIT(S): 5000 INJECTION INTRAVENOUS; SUBCUTANEOUS at 21:18

## 2018-12-27 RX ADMIN — HEPARIN SODIUM 5000 UNIT(S): 5000 INJECTION INTRAVENOUS; SUBCUTANEOUS at 07:00

## 2018-12-27 RX ADMIN — Medication 100 MILLIGRAM(S): at 18:37

## 2018-12-27 RX ADMIN — TAMSULOSIN HYDROCHLORIDE 0.4 MILLIGRAM(S): 0.4 CAPSULE ORAL at 21:18

## 2018-12-27 RX ADMIN — FINASTERIDE 5 MILLIGRAM(S): 5 TABLET, FILM COATED ORAL at 12:51

## 2018-12-27 RX ADMIN — Medication 3 MILLILITER(S): at 21:00

## 2018-12-27 RX ADMIN — ATORVASTATIN CALCIUM 20 MILLIGRAM(S): 80 TABLET, FILM COATED ORAL at 21:18

## 2018-12-27 RX ADMIN — CLOPIDOGREL BISULFATE 75 MILLIGRAM(S): 75 TABLET, FILM COATED ORAL at 12:51

## 2018-12-27 NOTE — PROGRESS NOTE ADULT - ASSESSMENT
Pulm/Pall:  Pt needs home IVAPS as noted from addendum to ov 12/17/18.  This has been reviewed with family.  Xanax to be minimized Pulm/Pall:  This a 82 yo male with chronic respiratory failure secondary to copd. He is in a chronic stable state at this time.  Currently with bipap his abg s reveal hypercapnea.  NIV with ivaps is most appropriate to keep airways stable and patent.  MPV si needed for 24/7 continous use in order to address daytime illness/exacerbation.  Back up battery is needed to ensure there is no lapse or interruption in therapy

## 2018-12-27 NOTE — PROGRESS NOTE ADULT - PROBLEM SELECTOR PLAN 1
pulm following  improved  stopped iv steroids  continue duonebs and prednisone pulm following  improved  stopped iv steroids  continue duonebs and prednisone  CPAP at night, home O2,ambulatory desats to 86%

## 2018-12-27 NOTE — PROGRESS NOTE ADULT - SUBJECTIVE AND OBJECTIVE BOX
Patient is a 83y old  Male who presents with a chief complaint of hypercapnic respiratory failure (26 Dec 2018 16:10)          Patient seen and examined at bedside.    ALLERGIES:  penicillin (Rash)        Vital Signs Last 24 Hrs  T(F): 97.7 (27 Dec 2018 05:00), Max: 97.7 (27 Dec 2018 05:00)  HR: 76 (27 Dec 2018 09:59) (72 - 77)  BP: 101/62 (27 Dec 2018 05:00) (101/55 - 109/56)  RR: 14 (27 Dec 2018 05:00) (14 - 18)  SpO2: 98% (27 Dec 2018 09:59) (97% - 100%)  I&O's Summary    26 Dec 2018 07:01  -  27 Dec 2018 07:00  --------------------------------------------------------  IN: 240 mL / OUT: 500 mL / NET: -260 mL      MEDICATIONS:  ALBUTerol/ipratropium for Nebulization 3 milliLiter(s) Nebulizer every 20 minutes  ALBUTerol/ipratropium for Nebulization 3 milliLiter(s) Nebulizer every 6 hours  ALPRAZolam 0.25 milliGRAM(s) Oral every 8 hours PRN  atorvastatin 20 milliGRAM(s) Oral at bedtime  azithromycin  IVPB 500 milliGRAM(s) IV Intermittent every 24 hours  clopidogrel Tablet 75 milliGRAM(s) Oral daily  finasteride 5 milliGRAM(s) Oral daily  heparin  Injectable 5000 Unit(s) SubCutaneous every 8 hours  levothyroxine 50 MICROGram(s) Oral daily  methylPREDNISolone sodium succinate Injectable 40 milliGRAM(s) IV Push every 8 hours  montelukast 10 milliGRAM(s) Oral daily  pantoprazole    Tablet 40 milliGRAM(s) Oral before breakfast  phenytoin   Capsule 100 milliGRAM(s) Oral two times a day  tamsulosin 0.4 milliGRAM(s) Oral at bedtime      PHYSICAL EXAM:  General: NAD, A/O x 3  ENT: MMM  Neck: Supple, No JVD  Lungs: diminished bilaterally  Cardio: RRR, S1/S2, No murmurs  Abdomen: Soft, Nontender, Nondistended; Bowel sounds present  Extremities: No cyanosis, No edema    LABS:                        10.2   6.6   )-----------( 129      ( 27 Dec 2018 07:00 )             33.4     12-27    143  |  104  |  28  ----------------------------<  92  4.7   |  38  |  0.96    Ca    9.4      27 Dec 2018 07:00  Phos  3.9     12-27  Mg     2.1     12-27    TPro  7.2  /  Alb  2.5  /  TBili  0.3  /  DBili  x   /  AST  55  /  ALT  54  /  AlkPhos  177  12-25    eGFR if Non African American: 73 mL/min/1.73M2 (12-27-18 @ 07:00)  eGFR if : 84 mL/min/1.73M2 (12-27-18 @ 07:00)    PT/INR - ( 25 Dec 2018 16:00 )   PT: 11.3 sec;   INR: 1.01 ratio         PTT - ( 25 Dec 2018 16:00 )  PTT:34.3 sec    CARDIAC MARKERS ( 25 Dec 2018 15:30 )  .054 ng/mL / x     / x     / x     / x                ABG - ( 26 Dec 2018 05:45 )  pH, Arterial: 7.44  pH, Blood: x     /  pCO2: 49    /  pO2: 199   / HCO3: x     / Base Excess: x     /  SaO2: 98                CAPILLARY BLOOD GLUCOSE                RADIOLOGY & ADDITIONAL TESTS:    Care Discussed with Consultants/Other Providers: Patient is a 83y old  Male who presents with a chief complaint of hypercapnic respiratory failure (26 Dec 2018 16:10)      S: Feels better    Patient seen and examined at bedside.    ALLERGIES:  penicillin (Rash)        Vital Signs Last 24 Hrs  T(F): 97.7 (27 Dec 2018 05:00), Max: 97.7 (27 Dec 2018 05:00)  HR: 76 (27 Dec 2018 09:59) (72 - 77)  BP: 101/62 (27 Dec 2018 05:00) (101/55 - 109/56)  RR: 14 (27 Dec 2018 05:00) (14 - 18)  SpO2: 98% (27 Dec 2018 09:59) (97% - 100%)  I&O's Summary    26 Dec 2018 07:01  -  27 Dec 2018 07:00  --------------------------------------------------------  IN: 240 mL / OUT: 500 mL / NET: -260 mL      MEDICATIONS:  ALBUTerol/ipratropium for Nebulization 3 milliLiter(s) Nebulizer every 20 minutes  ALBUTerol/ipratropium for Nebulization 3 milliLiter(s) Nebulizer every 6 hours  ALPRAZolam 0.25 milliGRAM(s) Oral every 8 hours PRN  atorvastatin 20 milliGRAM(s) Oral at bedtime  azithromycin  IVPB 500 milliGRAM(s) IV Intermittent every 24 hours  clopidogrel Tablet 75 milliGRAM(s) Oral daily  finasteride 5 milliGRAM(s) Oral daily  heparin  Injectable 5000 Unit(s) SubCutaneous every 8 hours  levothyroxine 50 MICROGram(s) Oral daily  methylPREDNISolone sodium succinate Injectable 40 milliGRAM(s) IV Push every 8 hours  montelukast 10 milliGRAM(s) Oral daily  pantoprazole    Tablet 40 milliGRAM(s) Oral before breakfast  phenytoin   Capsule 100 milliGRAM(s) Oral two times a day  tamsulosin 0.4 milliGRAM(s) Oral at bedtime      PHYSICAL EXAM:  General: NAD, A/O x 3  ENT: MMM  Neck: Supple, No JVD  Lungs: diminished bilaterally  Cardio: RRR, S1/S2, No murmurs  Abdomen: Soft, Nontender, Nondistended; Bowel sounds present  Extremities: No cyanosis, No edema  Neuro: no new deficits    LABS:                        10.2   6.6   )-----------( 129      ( 27 Dec 2018 07:00 )             33.4     12-27    143  |  104  |  28  ----------------------------<  92  4.7   |  38  |  0.96    Ca    9.4      27 Dec 2018 07:00  Phos  3.9     12-27  Mg     2.1     12-27    TPro  7.2  /  Alb  2.5  /  TBili  0.3  /  DBili  x   /  AST  55  /  ALT  54  /  AlkPhos  177  12-25    eGFR if Non African American: 73 mL/min/1.73M2 (12-27-18 @ 07:00)  eGFR if : 84 mL/min/1.73M2 (12-27-18 @ 07:00)    PT/INR - ( 25 Dec 2018 16:00 )   PT: 11.3 sec;   INR: 1.01 ratio         PTT - ( 25 Dec 2018 16:00 )  PTT:34.3 sec    CARDIAC MARKERS ( 25 Dec 2018 15:30 )  .054 ng/mL / x     / x     / x     / x                ABG - ( 26 Dec 2018 05:45 )  pH, Arterial: 7.44  pH, Blood: x     /  pCO2: 49    /  pO2: 199   / HCO3: x     / Base Excess: x     /  SaO2: 98                CAPILLARY BLOOD GLUCOSE                RADIOLOGY & ADDITIONAL TESTS:    Care Discussed with Consultants/Other Providers:

## 2018-12-27 NOTE — PROGRESS NOTE ADULT - ASSESSMENT
83M hx of CAD s/p stent 6/2017, Diastolic heart failure, CVA/TIA with no residual deficits, sz d/o, RA on Pred 5mg maintenance,  COPD, hx of esophageal ca s/p chemo/RT and esophagectomy complicated with strictures requiring dilatation brought to the hospital for increased lethargy. Admitted with hypercapnic respiratory failure likely due to COPD exacerbation. Clinically improved with NIV and steroids/nebs. Awake and alert at this time, oriented. Feels much better.     Assessment:  1. Acute on chronic hypercapnic respiratory failure  2. COPD exacerbation  3. Diastolic heart failure  4. CVA   5. Esophageal cancer  6. Hypernatremia   7. Rhematoid arthritis   8. BPH    - NIV support while sleeping and at night time as patient may have benefited from it  - Cont. course of steroids and nebs RTC  - Azithromycin for 5 days  - Cont. Plavix and home seizure medications  - Cont. Home dose of dilantin   - Reassess need for xanax   - Psychiatry recs noted   - Cont. BPH meds  - oral diet   - EF on previous >40% so ACEI and ARBS are optional at this time  - No evidence of CHF exacerbation on exam   - GI and DVT prophylaxis  - Palliative care consult   - Cont. treatment on medical floor

## 2018-12-27 NOTE — PROGRESS NOTE ADULT - SUBJECTIVE AND OBJECTIVE BOX
Psychiatry Consultation-Liaison Follow-up Note  83y  Encounter: 12/27/2018  Chart reviewed,  	  Met with-patient, patient's wife and sister  Contacted-Patient's private Psychiatrist.    Case Discussed with-Nursing    Interval History:    Symptom progression-    Medication Changes-  ALBUTerol/ipratropium for Nebulization 3 milliLiter(s) Nebulizer every 20 minutes  ALBUTerol/ipratropium for Nebulization 3 milliLiter(s) Nebulizer every 6 hours  ALPRAZolam 0.25 milliGRAM(s) Oral every 8 hours PRN  atorvastatin 20 milliGRAM(s) Oral at bedtime  azithromycin  IVPB 500 milliGRAM(s) IV Intermittent every 24 hours  clopidogrel Tablet 75 milliGRAM(s) Oral daily  escitalopram 5 milliGRAM(s) Oral daily  finasteride 5 milliGRAM(s) Oral daily  heparin  Injectable 5000 Unit(s) SubCutaneous every 8 hours  levothyroxine 50 MICROGram(s) Oral daily  montelukast 10 milliGRAM(s) Oral daily  pantoprazole    Tablet 40 milliGRAM(s) Oral before breakfast  phenytoin   Capsule 100 milliGRAM(s) Oral two times a day  predniSONE   Tablet 20 milliGRAM(s) Oral daily  tamsulosin 0.4 milliGRAM(s) Oral at bedtime    Mental Status Examination:  General Appearance-      Studies:    Laboratory testing-                        10.2   6.6   )-----------( 129      ( 27 Dec 2018 07:00 )             33.4     12-27    143  |  104  |  28<H>  ----------------------------<  92  4.7   |  38<H>  |  0.96    Ca    9.4      27 Dec 2018 07:00  Phos  3.9     12-27  Mg     2.1     12-27    TPro  7.2  /  Alb  2.5<L>  /  TBili  0.3  /  DBili  x   /  AST  55<H>  /  ALT  54<H>  /  AlkPhos  177<H>  12-25    CAPILLARY BLOOD GLUCOSE        LIVER FUNCTIONS - ( 25 Dec 2018 15:30 )  Alb: 2.5 g/dL / Pro: 7.2 g/dL / ALK PHOS: 177 U/L / ALT: 54 U/L DA / AST: 55 U/L / GGT: x             PT/INR - ( 25 Dec 2018 16:00 )   PT: 11.3 sec;   INR: 1.01 ratio         PTT - ( 25 Dec 2018 16:00 )  PTT:34.3 sec    Imaging-    Examinations-    Assessment:    Recommendations:    Medication-       Hospital course Follow-up Psychiatry Consultation-Liaison Follow-up Note  83y  Encounter: 12/27/2018  Chart reviewed,  	  Met with-patient, patient's wife and sister  Contacted-Patient's private Psychiatrist.    Case Discussed with-Nursing    Identifying Data: 84yo  Sabianism male domiciled with wife of 59 years in an apartment.  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    Chief Complaint: : "I slept well last night on the melatonin."    History of Present Illness:  83M hx of CAD s/p stent 6/2017, CVA/TIA with no residual deficits, sz d/o, RA on Pred 5mg maintenance,  COPD, diastolic CHF, hx of esophageal ca s/p chemo/RT and esophagectomy complicated with strictures requiring dilatation brought to the hospital for increased lethargy. As per wife patient was awake but kept falling asleep. Denies any fevers or chills reported in the ED. No reported headache, focal weakness, chest pain, palpitations, shortness of breath, abdominal pain, nausea, vomiting, diarrhea or dysuria.  In the ED patient found to be in hypercapnic likely secondary to COPD exacerbation. Patient was placed on AVAPS. Appears lethargic at time of evaluation. Wife at bedside, expressed limited medical interventions. (25 Dec 2018 18:17)    Symptoms and concerns- Psychiatry consulted for evaluation of anxiety in setting of COPD.  Patient presents alert, oriented x 3 with family at bedside.  Patient reports long history of anxiousness, has been seeing Dr Kalina Gibbs, Psychiatrist for twenty years.  Spoke with Dr Gibbs regarding pulmonologist's recommendations to minimize Xanax use.  Dr Gibbs feels medication is helpful to patient and would maintain medication at lowest dose to minimize anxiousness.  Discussed with family starting low dose SSRI for anxious, depressive symptoms with follow up with private psychiatrist.  Family and patient in agreement with plan of care.            Psychiatric Review of Systems:  Sleep Disturbance-including insomnia  Anxiety-including panic symptoms, worries, intrusive thoughts    Allergies  penicillin (Rash)    PAST MEDICAL & SURGICAL HISTORY:  Advanced COPD  Past Psychiatric History:  Treated by Dr Kalina Gibbs for over 20 years [phone]    Mental Status Examination:  General Appearance-small statured bald elderly male sitting up in chair with O2 via NC intact.  -hygiene/grooming-good  Behavior-pleasant  Attitude/ Deeogtwupygxb-el-bhewsjscj  -eye contact-good  Speech-normal rate, rhythm & volume  Thought Process-linear, goal directed  Thought Content-no perceptual disturbances, focused on leaving hospital  -self-harm or aggressive thoughts-denies  Mood-mildly dysphoric  Affect-mildly anxious  Cognition-alert, oriented x 3  Impulse Control-good  Insight/Judgment-good    Medication Changes-  ALBUTerol/ipratropium for Nebulization 3 milliLiter(s) Nebulizer every 20 minutes  ALBUTerol/ipratropium for Nebulization 3 milliLiter(s) Nebulizer every 6 hours  ALPRAZolam 0.25 milliGRAM(s) Oral every 8 hours PRN  atorvastatin 20 milliGRAM(s) Oral at bedtime  azithromycin  IVPB 500 milliGRAM(s) IV Intermittent every 24 hours  clopidogrel Tablet 75 milliGRAM(s) Oral daily  escitalopram 5 milliGRAM(s) Oral daily  finasteride 5 milliGRAM(s) Oral daily  heparin  Injectable 5000 Unit(s) SubCutaneous every 8 hours  levothyroxine 50 MICROGram(s) Oral daily  montelukast 10 milliGRAM(s) Oral daily  pantoprazole    Tablet 40 milliGRAM(s) Oral before breakfast  phenytoin   Capsule 100 milliGRAM(s) Oral two times a day  predniSONE   Tablet 20 milliGRAM(s) Oral daily  tamsulosin 0.4 milliGRAM(s) Oral at bedtime    Studies:    Laboratory testing-                        10.2   6.6   )-----------( 129      ( 27 Dec 2018 07:00 )             33.4     12-27    143  |  104  |  28<H>  ----------------------------<  92  4.7   |  38<H>  |  0.96    Ca    9.4      27 Dec 2018 07:00  Phos  3.9     12-27  Mg     2.1     12-27    TPro  7.2  /  Alb  2.5<L>  /  TBili  0.3  /  DBili  x   /  AST  55<H>  /  ALT  54<H>  /  AlkPhos  177<H>  12-25    CAPILLARY BLOOD GLUCOSE    LIVER FUNCTIONS - ( 25 Dec 2018 15:30 )  Alb: 2.5 g/dL / Pro: 7.2 g/dL / ALK PHOS: 177 U/L / ALT: 54 U/L DA / AST: 55 U/L / GGT: x             PT/INR - ( 25 Dec 2018 16:00 )   PT: 11.3 sec;   INR: 1.01 ratio         PTT - ( 25 Dec 2018 16:00 )  PTT:34.3 sec    Assessment: General Anxiety Disorder    Recommendations: Spoke with family and patient regarding Medication regimen, consulted with Dr Colten Gibbs, patient's psychiatrist for over 20 years, who would like patient to remain on medication at lowest dose to minimize anxiousness.  Discussed initiation of low dose SSRI, Lexapro 5 mg po daily & minimization of Xanax, family in agreement with plan of care.      Hospital course Follow-up  will continue to follow with you.

## 2018-12-27 NOTE — PROGRESS NOTE ADULT - SUBJECTIVE AND OBJECTIVE BOX
Follow-up Critical Care Progress Note  Chief Complaint : Chronic obstructive pulmonary disease with acute exacerbation    Awake and alert. Not in distress. Appears comfortable. States he did not wear the NIV at night time.     Allergies :penicillin (Rash)    PAST MEDICAL & SURGICAL HISTORY:  Advanced COPD  Coronary artery disease: s/p 3 stents on June 30, 2017 at [facility]  OA (osteoarthritis) of knee: right  Former smoker, stopped smoking in distant past  Rheumatoid arthritis  Seizure disorder: 1 episode approximately 15 yrs ago  Asthma  Hyperlipidemia  BPH (benign prostatic hyperplasia)  HTN (hypertension)  Esophagus cancer  Chronic allergic rhinitis  BCC (basal cell carcinoma): skin  Cerebrovascular accident (CVA)  Anxiety  Anastomotic leak following esophagectomy: [facility] esophagectomy  History of tonsillectomy  H/O heart artery stent: June 30, 2017  Knee arthropathy: left  History of total hip replacement: left  History of hernia repair    Medications:  MEDICATIONS  (STANDING):  ALBUTerol/ipratropium for Nebulization 3 milliLiter(s) Nebulizer every 20 minutes  ALBUTerol/ipratropium for Nebulization 3 milliLiter(s) Nebulizer every 6 hours  atorvastatin 20 milliGRAM(s) Oral at bedtime  azithromycin  IVPB 500 milliGRAM(s) IV Intermittent every 24 hours  clopidogrel Tablet 75 milliGRAM(s) Oral daily  finasteride 5 milliGRAM(s) Oral daily  heparin  Injectable 5000 Unit(s) SubCutaneous every 8 hours  levothyroxine 50 MICROGram(s) Oral daily  montelukast 10 milliGRAM(s) Oral daily  pantoprazole    Tablet 40 milliGRAM(s) Oral before breakfast  phenytoin   Capsule 100 milliGRAM(s) Oral two times a day  predniSONE   Tablet 20 milliGRAM(s) Oral daily  tamsulosin 0.4 milliGRAM(s) Oral at bedtime    MEDICATIONS  (PRN):  ALPRAZolam 0.25 milliGRAM(s) Oral every 8 hours PRN anxiety    LABS:                      10.2   6.6   )-----------( 129      ( 27 Dec 2018 07:00 )             33.4     12-27  143  |  104  |  28<H>  ----------------------------<  92  4.7   |  38<H>  |  0.96    Ca    9.4      27 Dec 2018 07:00  Phos  3.9     12-27  Mg     2.1     12-27    TPro  7.2  /  Alb  2.5<L>  /  TBili  0.3  /  DBili  x   /  AST  55<H>  /  ALT  54<H>  /  AlkPhos  177<H>  12-25    CARDIAC MARKERS ( 25 Dec 2018 15:30 )  .054 ng/mL / x     / x     / x     / x        PT/INR - ( 25 Dec 2018 16:00 )   PT: 11.3 sec;   INR: 1.01 ratio       PTT - ( 25 Dec 2018 16:00 )  PTT:34.3 sec    Serum Pro-Brain Natriuretic Peptide: 15314 pg/mL (12-25-18 @ 15:30)    CULTURES: (if applicable)    ABG - ( 26 Dec 2018 05:45 )  pH, Arterial: 7.44  pH, Blood: x     /  pCO2: 49    /  pO2: 199   / HCO3: x     / Base Excess: x     /  SaO2: 98        VITALS:  T(C): 36.5 (12-27-18 @ 05:00), Max: 36.5 (12-27-18 @ 05:00)  T(F): 97.7 (12-27-18 @ 05:00), Max: 97.7 (12-27-18 @ 05:00)  HR: 76 (12-27-18 @ 09:59) (72 - 77)  BP: 101/62 (12-27-18 @ 05:00) (101/55 - 109/56)  BP(mean): --  ABP: --  ABP(mean): --  RR: 14 (12-27-18 @ 05:00) (14 - 18)  SpO2: 100% (12-27-18 @ 10:59) (97% - 100%)  CVP(mm Hg): --  CVP(cm H2O): --    Ins and Outs     12-26-18 @ 07:01  -  12-27-18 @ 07:00  --------------------------------------------------------  IN: 240 mL / OUT: 500 mL / NET: -260 mL    12-27-18 @ 07:01  -  12-27-18 @ 11:19  --------------------------------------------------------  IN: 360 mL / OUT: 0 mL / NET: 360 mL    Height (cm): 160.02 (12-25-18 @ 18:20)  Weight (kg): 53.1 (12-25-18 @ 18:20)  BMI (kg/m2): 20.7 (12-25-18 @ 18:20)    Physical Examination:  Constitutional: Awake and alert, not in distress at this time.   Eyes: Pupils equal, round and reactive light  ENMT: Moist oral mucosa   Neck: Supple, no lymphadenopathy  Respiratory: Bilateral air entry, no wheezing or ronchi  Cardiovascular: S1, S2 no murmurs appreciated  Gastrointestinal: Soft, nontender, non distended   Extremities: No digital clubbing or cyanosis   Neurological: Awake and oriented x 3, moving all four extremities   Skin: Dry no, no cyanosis   Musculoskeletal: Thin frail man, with low muscle mass   Psychiatric: Calm

## 2018-12-27 NOTE — PROGRESS NOTE ADULT - ATTENDING COMMENTS
I have personally seen and examined patient on the above date.  I discussed the case with NP and I agree with findings and plan as detailed per note above, which I have amended where appropriate.      Pt here for hypercapnic respiratory failure, COPD exacerbation, improved now, needs home O2, teaching for CPAP at night, discussed with Zoey Ashby, pt likely can be discharged tomorrow.

## 2018-12-27 NOTE — CHART NOTE - NSCHARTNOTEFT_GEN_A_CORE
The patient is now in a chronic and stable state and  currently takes the following medications:      ALBUTerol/ipratropium for Nebulization 3 milliLiter(s) Nebulizer every 20 minutes  ALBUTerol/ipratropium for Nebulization 3 milliLiter(s) Nebulizer every 6 hours  ALPRAZolam 0.25 milliGRAM(s) Oral every 8 hours PRN  atorvastatin 20 milliGRAM(s) Oral at bedtime  azithromycin  IVPB 500 milliGRAM(s) IV Intermittent every 24 hours  clopidogrel Tablet 75 milliGRAM(s) Oral daily  escitalopram 5 milliGRAM(s) Oral daily  finasteride 5 milliGRAM(s) Oral daily  heparin  Injectable 5000 Unit(s) SubCutaneous every 8 hours  levothyroxine 50 MICROGram(s) Oral daily  montelukast 10 milliGRAM(s) Oral daily  pantoprazole    Tablet 40 milliGRAM(s) Oral before breakfast  phenytoin   Capsule 100 milliGRAM(s) Oral two times a day  predniSONE   Tablet 20 milliGRAM(s) Oral daily  tamsulosin 0.4 milliGRAM(s) Oral at bedtime      These medications are not sufficient in correcting the hypoxemia.      Supplemental Oxygen is required to treat the patient's COPD.    SPO2 on room air 98%  SPO2 ra ambulating 86%  SPO2 ambulating with oxygen 90%    Patient will require 3 liters nasal canula stationary and portable on discharge.

## 2018-12-27 NOTE — PROGRESS NOTE ADULT - SUBJECTIVE AND OBJECTIVE BOX
Follow-up Pulm Progress Note    Declan Ashby MD  [phone]    No new respiratory events overnight.  Denies SOB/CP. Slept well last night    Medications:  Vital Signs Last 24 Hrs  T(C): 36.5 (27 Dec 2018 05:00), Max: 36.5 (27 Dec 2018 05:00)  T(F): 97.7 (27 Dec 2018 05:00), Max: 97.7 (27 Dec 2018 05:00)  HR: 76 (27 Dec 2018 09:59) (72 - 77)  BP: 101/62 (27 Dec 2018 05:00) (101/55 - 109/56)  BP(mean): --  RR: 14 (27 Dec 2018 05:00) (14 - 18)  SpO2: 100% (27 Dec 2018 10:59) (97% - 100%)    ABG - ( 26 Dec 2018 05:45 )  pH, Arterial: 7.44  pH, Blood: x     /  pCO2: 49    /  pO2: 199   / HCO3: x     / Base Excess: x     /  SaO2: 98                    12-26 @ 07:01  -  12-27 @ 07:00  --------------------------------------------------------  IN: 240 mL / OUT: 500 mL / NET: -260 mL        LABS:                        10.2   6.6   )-----------( 129      ( 27 Dec 2018 07:00 )             33.4     12-27    143  |  104  |  28<H>  ----------------------------<  92  4.7   |  38<H>  |  0.96    Ca    9.4      27 Dec 2018 07:00  Phos  3.9     12-27  Mg     2.1     12-27    TPro  7.2  /  Alb  2.5<L>  /  TBili  0.3  /  DBili  x   /  AST  55<H>  /  ALT  54<H>  /  AlkPhos  177<H>  12-25      PT/INR - ( 25 Dec 2018 16:00 )   PT: 11.3 sec;   INR: 1.01 ratio         PTT - ( 25 Dec 2018 16:00 )  PTT:34.3 sec    Serum Pro-Brain Natriuretic Peptide: 95686 pg/mL (12-25-18 @ 15:30)      CULTURES:        Physical Examination:  PULM: Clear to auscultation bilaterally, no significant sputum production  CVS: Regular rate and rhythm, no murmurs, rubs, or gallops  ABD: Soft, non-tender

## 2018-12-27 NOTE — PROGRESS NOTE ADULT - PROBLEM SELECTOR PLAN 9
plan to check room air sats and after ambulation  plan to discharge home tomorrow pending pulm clearance plan to discharge home tomorrow with home O2, cleared by Pulm

## 2018-12-27 NOTE — PROGRESS NOTE ADULT - ASSESSMENT
83M hx of CAD s/p stent 6/2017, Diastolic heart failure, CVA/TIA with no residual deficits, sz d/o, RA on Pred 5mg maintenance,  COPD, hx of esophageal ca s/p chemo/RT and esophagectomy complicated with strictures requiring dilatation brought to the hospital for increased lethargy. Admitted with hypercapnic respiratory failure likely due to COPD exacerbation. Clinically improved with NIV and steroids/nebs. Awake and alert at this time, oriented. Feels much better. 83M hx of CAD s/p stent 6/2017, Diastolic heart failure, CVA/TIA with no residual deficits, sz d/o, RA on Pred 5mg maintenance,  COPD, hx of esophageal ca s/p chemo/RT and esophagectomy complicated with strictures requiring dilatation brought to the hospital for increased lethargy. Admitted with hypercapnic respiratory failure due to COPD exacerbation. Clinically improved with NIV and steroids/nebs. Awake and alert at this time, oriented. Feels much better.

## 2018-12-28 ENCOUNTER — TRANSCRIPTION ENCOUNTER (OUTPATIENT)
Age: 83
End: 2018-12-28

## 2018-12-28 VITALS — DIASTOLIC BLOOD PRESSURE: 78 MMHG | SYSTOLIC BLOOD PRESSURE: 122 MMHG

## 2018-12-28 LAB
ANION GAP SERPL CALC-SCNC: -1 MMOL/L — LOW (ref 5–17)
BUN SERPL-MCNC: 32 MG/DL — HIGH (ref 7–23)
CALCIUM SERPL-MCNC: 9.2 MG/DL — SIGNIFICANT CHANGE UP (ref 8.4–10.5)
CHLORIDE SERPL-SCNC: 103 MMOL/L — SIGNIFICANT CHANGE UP (ref 96–108)
CO2 SERPL-SCNC: 37 MMOL/L — HIGH (ref 22–31)
CREAT SERPL-MCNC: 0.95 MG/DL — SIGNIFICANT CHANGE UP (ref 0.5–1.3)
GLUCOSE SERPL-MCNC: 83 MG/DL — SIGNIFICANT CHANGE UP (ref 70–99)
HCT VFR BLD CALC: 32.5 % — LOW (ref 39–50)
HGB BLD-MCNC: 10 G/DL — LOW (ref 13–17)
MCHC RBC-ENTMCNC: 30.8 GM/DL — LOW (ref 32–36)
MCHC RBC-ENTMCNC: 30.8 PG — SIGNIFICANT CHANGE UP (ref 27–34)
MCV RBC AUTO: 100.1 FL — HIGH (ref 80–100)
PLATELET # BLD AUTO: 124 K/UL — LOW (ref 150–400)
POTASSIUM SERPL-MCNC: 4.2 MMOL/L — SIGNIFICANT CHANGE UP (ref 3.5–5.3)
POTASSIUM SERPL-SCNC: 4.2 MMOL/L — SIGNIFICANT CHANGE UP (ref 3.5–5.3)
RBC # BLD: 3.24 M/UL — LOW (ref 4.2–5.8)
RBC # FLD: 18 % — HIGH (ref 10.3–14.5)
SODIUM SERPL-SCNC: 139 MMOL/L — SIGNIFICANT CHANGE UP (ref 135–145)
WBC # BLD: 5.5 K/UL — SIGNIFICANT CHANGE UP (ref 3.8–10.5)
WBC # FLD AUTO: 5.5 K/UL — SIGNIFICANT CHANGE UP (ref 3.8–10.5)

## 2018-12-28 PROCEDURE — 82803 BLOOD GASES ANY COMBINATION: CPT

## 2018-12-28 PROCEDURE — 83735 ASSAY OF MAGNESIUM: CPT

## 2018-12-28 PROCEDURE — 99232 SBSQ HOSP IP/OBS MODERATE 35: CPT

## 2018-12-28 PROCEDURE — 92610 EVALUATE SWALLOWING FUNCTION: CPT

## 2018-12-28 PROCEDURE — 99239 HOSP IP/OBS DSCHRG MGMT >30: CPT

## 2018-12-28 PROCEDURE — 84484 ASSAY OF TROPONIN QUANT: CPT

## 2018-12-28 PROCEDURE — 71045 X-RAY EXAM CHEST 1 VIEW: CPT

## 2018-12-28 PROCEDURE — 85027 COMPLETE CBC AUTOMATED: CPT

## 2018-12-28 PROCEDURE — 85730 THROMBOPLASTIN TIME PARTIAL: CPT

## 2018-12-28 PROCEDURE — 93005 ELECTROCARDIOGRAM TRACING: CPT

## 2018-12-28 PROCEDURE — 94640 AIRWAY INHALATION TREATMENT: CPT

## 2018-12-28 PROCEDURE — 85610 PROTHROMBIN TIME: CPT

## 2018-12-28 PROCEDURE — 80185 ASSAY OF PHENYTOIN TOTAL: CPT

## 2018-12-28 PROCEDURE — 97116 GAIT TRAINING THERAPY: CPT

## 2018-12-28 PROCEDURE — 83880 ASSAY OF NATRIURETIC PEPTIDE: CPT

## 2018-12-28 PROCEDURE — 97161 PT EVAL LOW COMPLEX 20 MIN: CPT

## 2018-12-28 PROCEDURE — 36000 PLACE NEEDLE IN VEIN: CPT

## 2018-12-28 PROCEDURE — 94660 CPAP INITIATION&MGMT: CPT

## 2018-12-28 PROCEDURE — 36600 WITHDRAWAL OF ARTERIAL BLOOD: CPT

## 2018-12-28 PROCEDURE — 80053 COMPREHEN METABOLIC PANEL: CPT

## 2018-12-28 PROCEDURE — 80048 BASIC METABOLIC PNL TOTAL CA: CPT

## 2018-12-28 PROCEDURE — 84100 ASSAY OF PHOSPHORUS: CPT

## 2018-12-28 PROCEDURE — 99291 CRITICAL CARE FIRST HOUR: CPT | Mod: 25

## 2018-12-28 RX ORDER — LEVOTHYROXINE SODIUM 125 MCG
1 TABLET ORAL
Qty: 0 | Refills: 0 | DISCHARGE
Start: 2018-12-28

## 2018-12-28 RX ORDER — CLOPIDOGREL BISULFATE 75 MG/1
1 TABLET, FILM COATED ORAL
Qty: 0 | Refills: 0 | DISCHARGE
Start: 2018-12-28

## 2018-12-28 RX ORDER — PANTOPRAZOLE SODIUM 20 MG/1
1 TABLET, DELAYED RELEASE ORAL
Qty: 0 | Refills: 0 | DISCHARGE
Start: 2018-12-28

## 2018-12-28 RX ORDER — AZITHROMYCIN 500 MG/1
1 TABLET, FILM COATED ORAL
Qty: 3 | Refills: 0
Start: 2018-12-28 | End: 2018-12-30

## 2018-12-28 RX ORDER — FINASTERIDE 5 MG/1
1 TABLET, FILM COATED ORAL
Qty: 0 | Refills: 0 | COMMUNITY

## 2018-12-28 RX ORDER — ALPRAZOLAM 0.25 MG
1 TABLET ORAL
Qty: 0 | Refills: 0 | DISCHARGE
Start: 2018-12-28

## 2018-12-28 RX ORDER — LEVOTHYROXINE SODIUM 125 MCG
50 TABLET ORAL
Qty: 0 | Refills: 0 | COMMUNITY

## 2018-12-28 RX ORDER — TAMSULOSIN HYDROCHLORIDE 0.4 MG/1
1 CAPSULE ORAL
Qty: 0 | Refills: 0 | COMMUNITY

## 2018-12-28 RX ORDER — MONTELUKAST 4 MG/1
1 TABLET, CHEWABLE ORAL
Qty: 0 | Refills: 0 | DISCHARGE
Start: 2018-12-28

## 2018-12-28 RX ORDER — TAMSULOSIN HYDROCHLORIDE 0.4 MG/1
1 CAPSULE ORAL
Qty: 0 | Refills: 0 | DISCHARGE
Start: 2018-12-28

## 2018-12-28 RX ORDER — FINASTERIDE 5 MG/1
1 TABLET, FILM COATED ORAL
Qty: 0 | Refills: 0 | DISCHARGE
Start: 2018-12-28

## 2018-12-28 RX ORDER — PANTOPRAZOLE SODIUM 20 MG/1
1 TABLET, DELAYED RELEASE ORAL
Qty: 0 | Refills: 0 | COMMUNITY

## 2018-12-28 RX ORDER — ESCITALOPRAM OXALATE 10 MG/1
1 TABLET, FILM COATED ORAL
Qty: 30 | Refills: 0
Start: 2018-12-28 | End: 2019-01-01

## 2018-12-28 RX ADMIN — Medication 3 MILLILITER(S): at 03:05

## 2018-12-28 RX ADMIN — HEPARIN SODIUM 5000 UNIT(S): 5000 INJECTION INTRAVENOUS; SUBCUTANEOUS at 06:09

## 2018-12-28 RX ADMIN — Medication 20 MILLIGRAM(S): at 06:09

## 2018-12-28 RX ADMIN — Medication 3 MILLILITER(S): at 09:43

## 2018-12-28 RX ADMIN — CLOPIDOGREL BISULFATE 75 MILLIGRAM(S): 75 TABLET, FILM COATED ORAL at 12:08

## 2018-12-28 RX ADMIN — PANTOPRAZOLE SODIUM 40 MILLIGRAM(S): 20 TABLET, DELAYED RELEASE ORAL at 06:09

## 2018-12-28 RX ADMIN — FINASTERIDE 5 MILLIGRAM(S): 5 TABLET, FILM COATED ORAL at 12:08

## 2018-12-28 RX ADMIN — Medication 100 MILLIGRAM(S): at 06:09

## 2018-12-28 RX ADMIN — Medication 50 MICROGRAM(S): at 06:09

## 2018-12-28 RX ADMIN — ESCITALOPRAM OXALATE 5 MILLIGRAM(S): 10 TABLET, FILM COATED ORAL at 12:08

## 2018-12-28 RX ADMIN — MONTELUKAST 10 MILLIGRAM(S): 4 TABLET, CHEWABLE ORAL at 12:08

## 2018-12-28 NOTE — DISCHARGE NOTE ADULT - CARE PLAN
Principal Discharge DX:	Acute on chronic respiratory failure with hypercapnia  Goal:	Improve  Assessment and plan of treatment:	Started on CPAP, nebulizers, steroids, abx, now improved, will go on home oxygen for desats to 86% with ambulation, home NIV arranged, home O2 arranged for use with activity as needed  Secondary Diagnosis:	Advanced COPD  Goal:	Imrpove  Assessment and plan of treatment:	Nebs, steroids, abx

## 2018-12-28 NOTE — DISCHARGE NOTE ADULT - PLAN OF CARE
Improve Started on CPAP, nebulizers, steroids, abx, now improved, will go on home oxygen for desats to 86% with ambulation, home NIV arranged, home O2 arranged for use with activity as needed Imrpove Nebs, steroids, abx

## 2018-12-28 NOTE — PROGRESS NOTE ADULT - SUBJECTIVE AND OBJECTIVE BOX
CC: Patient is a 83y old  Male who presents with a chief complaint of COPD (27 Dec 2018 14:20)      S: No f/c/n/v/pain    Patient seen and examined at bedside.    ALLERGIES:  penicillin (Rash)      MEDICATIONS:  ALBUTerol/ipratropium for Nebulization 3 milliLiter(s) Nebulizer every 20 minutes  ALBUTerol/ipratropium for Nebulization 3 milliLiter(s) Nebulizer every 6 hours  ALPRAZolam 0.25 milliGRAM(s) Oral every 8 hours PRN  atorvastatin 20 milliGRAM(s) Oral at bedtime  azithromycin  IVPB 500 milliGRAM(s) IV Intermittent every 24 hours  clopidogrel Tablet 75 milliGRAM(s) Oral daily  escitalopram 5 milliGRAM(s) Oral daily  finasteride 5 milliGRAM(s) Oral daily  heparin  Injectable 5000 Unit(s) SubCutaneous every 8 hours  levothyroxine 50 MICROGram(s) Oral daily  montelukast 10 milliGRAM(s) Oral daily  pantoprazole    Tablet 40 milliGRAM(s) Oral before breakfast  phenytoin   Capsule 100 milliGRAM(s) Oral two times a day  predniSONE   Tablet 20 milliGRAM(s) Oral daily  sodium chloride 0.65% Nasal 1 Spray(s) Both Nostrils every 6 hours PRN  tamsulosin 0.4 milliGRAM(s) Oral at bedtime        Vital Signs Last 24 Hrs  T(F): 97.4 (28 Dec 2018 06:06), Max: 97.6 (27 Dec 2018 20:45)  HR: 72 (28 Dec 2018 06:06) (72 - 85)  BP: 96/57 (28 Dec 2018 06:06) (96/57 - 117/72)  RR: 14 (28 Dec 2018 06:06) (14 - 15)  SpO2: 100% (28 Dec 2018 06:06) (91% - 100%)  I&O's Summary    27 Dec 2018 07:01  -  28 Dec 2018 07:00  --------------------------------------------------------  IN: 1210 mL / OUT: 975 mL / NET: 235 mL        PHYSICAL EXAM:  General: NAD  ENT: MMM  Neck: Supple, No JVD  Lungs: Clear to auscultation bilaterally  Cardio: RRR, S1/S2, No murmurs  Abdomen: Soft, Nontender, Nondistended; Bowel sounds present  Extremities: No cyanosis, No edema  Neuro: no new deficits  Skin: no rashes  Psych: AAO    LABS:                        10.0   5.5   )-----------( 124      ( 28 Dec 2018 07:05 )             32.5     12-28    139  |  103  |  32  ----------------------------<  83  4.2   |  37  |  0.95    Ca    9.2      28 Dec 2018 07:05  Phos  3.9     12-27  Mg     2.1     12-27    TPro  7.2  /  Alb  2.5  /  TBili  0.3  /  DBili  x   /  AST  55  /  ALT  54  /  AlkPhos  177  12-25    eGFR if Non African American: 74 mL/min/1.73M2 (12-28-18 @ 07:05)  eGFR if African American: 85 mL/min/1.73M2 (12-28-18 @ 07:05)    PT/INR - ( 25 Dec 2018 16:00 )   PT: 11.3 sec;   INR: 1.01 ratio         PTT - ( 25 Dec 2018 16:00 )  PTT:34.3 sec    CARDIAC MARKERS ( 25 Dec 2018 15:30 )  .054 ng/mL / x     / x     / x     / x                ABG - ( 26 Dec 2018 05:45 )  pH, Arterial: 7.44  pH, Blood: x     /  pCO2: 49    /  pO2: 199   / HCO3: x     / Base Excess: x     /  SaO2: 98                CAPILLARY BLOOD GLUCOSE                RADIOLOGY & ADDITIONAL TESTS:    Care Discussed with Consultants/Other Providers:

## 2018-12-28 NOTE — DISCHARGE NOTE ADULT - MEDICATION SUMMARY - MEDICATIONS TO TAKE
I will START or STAY ON the medications listed below when I get home from the hospital:    finasteride 5 mg oral tablet  -- 1 tab(s) by mouth once a day  -- Indication: For BPH    predniSONE 20 mg oral tablet  -- 1 tab(s) by mouth once a day  -- Indication: For COPD exacerbation    tamsulosin 0.4 mg oral capsule  -- 1 cap(s) by mouth once a day (at bedtime)  -- Indication: For BPH    phenytoin 100 mg oral capsule, extended release  -- 1 cap(s) by mouth 2 times a day  -- Indication: For Seizure disorder    escitalopram 5 mg oral tablet  -- 1 tab(s) by mouth once a day  -- Indication: For MOOD    ezetimibe 10 mg oral tablet  -- 1 tab(s) by mouth once a day  -- Indication: For HLD    simvastatin  -- 40 milligram(s) by mouth once a day  -- Indication: For HLD    clopidogrel 75 mg oral tablet  -- 1 tab(s) by mouth once a day  -- Indication: For Cad    ALPRAZolam 0.25 mg oral tablet  -- 1 tab(s) by mouth every 8 hours, As needed, anxiety  -- Indication: For Mood    ProAir HFA 90 mcg/inh inhalation aerosol  -- 2 puff(s) inhaled 4 times a day  -- Indication: For COPD exacerbation    montelukast 10 mg oral tablet  -- 1 tab(s) by mouth once a day  -- Indication: For COPD exacerbation    Azithromycin 3 Day Dose Pack 500 mg oral tablet  -- 1 tab(s) by mouth once a day  -- Indication: For COPD exacerbation    pantoprazole 40 mg oral delayed release tablet  -- 1 tab(s) by mouth once a day (before a meal)  -- Indication: For gerd    levothyroxine 50 mcg (0.05 mg) oral tablet  -- 1 tab(s) by mouth once a day  -- Indication: For Hypothyroid

## 2018-12-28 NOTE — PROGRESS NOTE ADULT - SUBJECTIVE AND OBJECTIVE BOX
Follow-up Critical Care Progress Note  Chief Complaint : Chronic obstructive pulmonary disease with acute exacerbation    patient seen and examined   case d/w wife bedside  no cp, sob, palp, n/v  resp status basline  Wife requests patient to go home with ambulate over ambulance.       Allergies :penicillin (Rash)      PAST MEDICAL & SURGICAL HISTORY:  Advanced COPD  Coronary artery disease: s/p 3 stents on June 30, 2017 at [facility] Dr. 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  History of tonsillectomy  H/O heart artery stent: June 30, 2017  Knee arthropathy: left  History of total hip replacement: left  History of hernia repair      Medications:  MEDICATIONS  (STANDING):  ALBUTerol/ipratropium for Nebulization 3 milliLiter(s) Nebulizer every 20 minutes  ALBUTerol/ipratropium for Nebulization 3 milliLiter(s) Nebulizer every 6 hours  atorvastatin 20 milliGRAM(s) Oral at bedtime  azithromycin  IVPB 500 milliGRAM(s) IV Intermittent every 24 hours  clopidogrel Tablet 75 milliGRAM(s) Oral daily  escitalopram 5 milliGRAM(s) Oral daily  finasteride 5 milliGRAM(s) Oral daily  heparin  Injectable 5000 Unit(s) SubCutaneous every 8 hours  levothyroxine 50 MICROGram(s) Oral daily  montelukast 10 milliGRAM(s) Oral daily  pantoprazole    Tablet 40 milliGRAM(s) Oral before breakfast  phenytoin   Capsule 100 milliGRAM(s) Oral two times a day  predniSONE   Tablet 20 milliGRAM(s) Oral daily  tamsulosin 0.4 milliGRAM(s) Oral at bedtime    MEDICATIONS  (PRN):  ALPRAZolam 0.25 milliGRAM(s) Oral every 8 hours PRN anxiety  sodium chloride 0.65% Nasal 1 Spray(s) Both Nostrils every 6 hours PRN Nasal Congestion      LABS:                        10.0   5.5   )-----------( 124      ( 28 Dec 2018 07:05 )             32.5     12-28    139  |  103  |  32<H>  ----------------------------<  83  4.2   |  37<H>  |  0.95    Ca    9.2      28 Dec 2018 07:05  Phos  3.9     12-27  Mg     2.1     12-27                    Serum Pro-Brain Natriuretic Peptide: 96492 pg/mL (12-25-18 @ 15:30)        CULTURES: (if applicable)          CAPILLARY BLOOD GLUCOSE          RADIOLOGY  CXR:  < from: Xray Chest 1 View AP/PA (12.25.18 @ 15:25) >  Patchy bibasilar densities may represent infiltrate versus atelectasis.    < end of copied text >      CT:    ECHO:  < from: TTE Echo Complete w/Doppler (09.17.18 @ 13:30) >  Summary:   1. Moderate to severe calcific aortic stenosis with mild aortic insufficiency   2. Left ventricular ejection fraction, by visual estimation, is 65%.   3. Normal global left ventricular systolic function.   4. Spectral Doppler shows pseudonormal pattern of left ventricular myocardial filling (Grade II diastolic dysfunction).   5. There is mild concentric left ventricular hypertrophy.   6. Thickening and calcification of the posterior mitral valve leaflet.   7. Mild tricuspid regurgitation.   8. Estimated pulmonary artery systolic pressure is 54.9 mmHg assuming a right atrial pressure of 10 mmHg, which is consistent with moderate pulmonary hypertension.   9. Pulmonary hypertension is present.  10. LA volume Index is 56.7 ml/m² ml/m2.  11. The aortic valve mean gradient is 22.9 mmHg consistent with moderate aortic stenosis.    < end of copied text >      VITALS:  T(C): 36.3 (12-28-18 @ 06:06), Max: 36.4 (12-27-18 @ 16:18)  T(F): 97.4 (12-28-18 @ 06:06), Max: 97.6 (12-27-18 @ 20:45)  HR: 68 (12-28-18 @ 09:45) (68 - 85)  BP: 96/57 (12-28-18 @ 06:06) (96/57 - 117/72)  BP(mean): --  ABP: --  ABP(mean): --  RR: 14 (12-28-18 @ 06:06) (14 - 15)  SpO2: 95% (12-28-18 @ 09:45) (91% - 100%)  CVP(mm Hg): --  CVP(cm H2O): --    Ins and Outs     12-27-18 @ 07:01  -  12-28-18 @ 07:00  --------------------------------------------------------  IN: 1210 mL / OUT: 975 mL / NET: 235 mL    12-28-18 @ 07:01  -  12-28-18 @ 12:13  --------------------------------------------------------  IN: 480 mL / OUT: 300 mL / NET: 180 mL        Height (cm): 160.02 (12-25-18 @ 18:20)  Weight (kg): 53.1 (12-25-18 @ 18:20)  BMI (kg/m2): 20.7 (12-25-18 @ 18:20)

## 2018-12-28 NOTE — PROGRESS NOTE ADULT - PROBLEM SELECTOR PLAN 1
pulm following  improved  stopped iv steroids  continue duonebs and prednisone  CPAP at night, home O2,ambulatory desats to 86%

## 2018-12-28 NOTE — DISCHARGE NOTE ADULT - PATIENT PORTAL LINK FT
You can access the [facility], offered by [facility], by registering with the following website: [email or web address]

## 2018-12-28 NOTE — DISCHARGE NOTE ADULT - CARE PROVIDER_API CALL
Declan Ashby), Critical Care Medicine  [address] [place] [address]  [address]  [place] [address]  Phone: [phone]  Fax: [phone]

## 2018-12-28 NOTE — PROGRESS NOTE ADULT - ASSESSMENT
Physical Examination:  Constitutional: Awake and alert, not in distress at this time.   Respiratory: Bilateral air entry, no wheezing or ronchi, diminished b/s  Cardiovascular: S1, S2 no murmurs   Gastrointestinal: Soft, nontender, non distended  + BS  Extremities: No digital clubbing or cyanosis   Neurological: Awake and oriented x 3, moving all four extremities   Skin: Dry no, no cyanosis   Musculoskeletal: Thin frail man, with low muscle mass  + kyphossi  Psychiatric: Calm    Assessment and Plan:   83M hx of CAD s/p stent 6/2017, Diastolic heart failure, CVA/TIA with no residual deficits, sz d/o, RA on Pred 5mg maintenance,  COPD, hx of esophageal ca s/p chemo/RT and esophagectomy complicated with strictures requiring dilatation brought to the hospital for increased lethargy. Admitted with hypercapnic respiratory failure likely due to COPD exacerbation. Clinically improved with NIV and steroids/nebs. Awake and alert at this time, oriented. Feels much better.     Assessment:  1. Acute on chronic hypercapnic respiratory failure  2. COPD exacerbation resolving  3. Diastolic heart failure  4. CVA   5. Esophageal cancer  6. Hypernatremia   7. Rhematoid arthritis   8. BPH    - NIV support while sleeping and at night time as patient may have benefited from it  - Cont. course of steroids and nebs RTC  - Azithromycin for 5 days  - Cont. Plavix and home seizure medications  - Cont. Home dose of dilantin   - oral diet as tolerated  - No evidence of CHF exacerbation on exam   - GI and DVT prophylaxis  - Palliative care f/u  - Dispo planning as per primary team.   - Plan to f/u with Pulm with Dr. Ashby

## 2018-12-28 NOTE — DISCHARGE NOTE ADULT - MEDICATION SUMMARY - MEDICATIONS TO CHANGE
I will SWITCH the dose or number of times a day I take the medications listed below when I get home from the hospital:    predniSONE  -- 5 mg by mouth once a day    Start after you complete the course of the higher dose of predisone

## 2018-12-28 NOTE — DISCHARGE NOTE ADULT - HOSPITAL COURSE
83M hx of CAD s/p stent 6/2017, Diastolic heart failure, CVA/TIA with no residual deficits, sz d/o, RA on Pred 5mg maintenance,  COPD, hx of esophageal ca s/p chemo/RT and esophagectomy complicated with strictures requiring dilatation brought to the hospital for increased lethargy. Admitted with hypercapnic respiratory failure due to COPD exacerbation. Clinically improved with NIV and steroids/nebs. Awake and alert at this time, oriented. Feels much better.      Problem/Plan - 1:  ·  Problem: COPD exacerbation.  Plan: pulm following  improved  stopped iv steroids  continue duonebs and prednisone  CPAP at night, home O2,ambulatory desats to 86%.      Problem/Plan - 2:  ·  Problem: Acute on chronic respiratory failure with hypercapnia.  Plan: resolved.      Problem/Plan - 3:  ·  Problem: Chronic diastolic congestive heart failure.  Plan: chronic stable no exacerbations.      Problem/Plan - 4:  ·  Problem: Cerebrovascular accident (CVA), unspecified mechanism.  Plan: stable  continue medical management with statin and plavix.      Problem/Plan - 5:  ·  Problem: Malignant neoplasm of esophagus, unspecified location.  Plan: s/p chemo/RT and esophagectomy.      Problem/Plan - 6:  Problem: Seizure disorder. Plan: continue dilantin.     Problem/Plan - 7:  ·  Problem: Benign prostatic hyperplasia, unspecified whether lower urinary tract symptoms present.  Plan: continue finasteride and flomax.      Problem/Plan - 8:  ·  Problem: Hypernatremia.  Plan: resolved.      Problem/Plan - 9:  ·  Problem: Discharge planning issues.  Plan: plan to discharge home with home O2, CPAP, cleared by Pulm, Prednisone 20 on discharge until follow up

## 2018-12-28 NOTE — PROGRESS NOTE ADULT - ASSESSMENT
83M hx of CAD s/p stent 6/2017, Diastolic heart failure, CVA/TIA with no residual deficits, sz d/o, RA on Pred 5mg maintenance,  COPD, hx of esophageal ca s/p chemo/RT and esophagectomy complicated with strictures requiring dilatation brought to the hospital for increased lethargy. Admitted with hypercapnic respiratory failure due to COPD exacerbation. Clinically improved with NIV and steroids/nebs. Awake and alert at this time, oriented. Feels much better.

## 2018-12-28 NOTE — PROGRESS NOTE ADULT - SUBJECTIVE AND OBJECTIVE BOX
Psychiatry Consultation-Liaison Follow-up Note  83y  Encounter: 12/28/2018  Chart reviewed,  	  Met with-patient  Case Discussed with-Nursing    Interval History:    Symptom progression-    Medication Changes-  ALBUTerol/ipratropium for Nebulization 3 milliLiter(s) Nebulizer every 20 minutes  ALBUTerol/ipratropium for Nebulization 3 milliLiter(s) Nebulizer every 6 hours  ALPRAZolam 0.25 milliGRAM(s) Oral every 8 hours PRN  atorvastatin 20 milliGRAM(s) Oral at bedtime  azithromycin  IVPB 500 milliGRAM(s) IV Intermittent every 24 hours  clopidogrel Tablet 75 milliGRAM(s) Oral daily  escitalopram 5 milliGRAM(s) Oral daily  finasteride 5 milliGRAM(s) Oral daily  heparin  Injectable 5000 Unit(s) SubCutaneous every 8 hours  levothyroxine 50 MICROGram(s) Oral daily  montelukast 10 milliGRAM(s) Oral daily  pantoprazole    Tablet 40 milliGRAM(s) Oral before breakfast  phenytoin   Capsule 100 milliGRAM(s) Oral two times a day  predniSONE   Tablet 20 milliGRAM(s) Oral daily  sodium chloride 0.65% Nasal 1 Spray(s) Both Nostrils every 6 hours PRN  tamsulosin 0.4 milliGRAM(s) Oral at bedtime      Mental Status Examination:    Studies:    Laboratory testing-                        10.0   5.5   )-----------( 124      ( 28 Dec 2018 07:05 )             32.5     12-28    139  |  103  |  32<H>  ----------------------------<  83  4.2   |  37<H>  |  0.95    Ca    9.2      28 Dec 2018 07:05  Phos  3.9     12-27  Mg     2.1     12-27      Assessment:    Recommendations:    Medication-     Other Treatment considerations-    Guidance for team/ family management-    Guidance for patient management-    Referrals-     Hospital course Follow-up Psychiatry Consultation-Liaison Follow-up Note  83y  Encounter: 12/28/2018  Chart reviewed,  	  Met with-patient  Case Discussed with-Nursing    Identifying Data: 84yo  Christianity male domiciled with wife of 59 years in an apartment.  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    History of Present Illness:  83M hx of CAD s/p stent 6/2017, CVA/TIA with no residual deficits, sz d/o, RA on Pred 5mg maintenance,  COPD, diastolic CHF, hx of esophageal ca s/p chemo/RT and esophagectomy complicated with strictures requiring dilatation brought to the hospital for increased lethargy. As per wife patient was awake but kept falling asleep. Denies any fevers or chills reported in the ED. No reported headache, focal weakness, chest pain, palpitations, shortness of breath, abdominal pain, nausea, vomiting, diarrhea or dysuria.  In the ED patient found to be in hypercapnic likely secondary to COPD exacerbation. Patient was placed on AVAPS. Appears lethargic at time of evaluation. Wife at bedside, expressed limited medical interventions. (25 Dec 2018 18:17)    Symptoms and concerns- Psychiatry consulted for evaluation of anxiety in setting of COPD.  Patient presents alert, oriented x 3 with family at bedside.  Patient reports long history of anxiousness, has been seeing Dr Kalina Gibbs, Psychiatrist for twenty years.  Spoke with Dr Gibbs regarding pulmonologist's recommendations to minimize Xanax use.  Dr Gibbs feels medication is helpful to patient and would maintain medication at lowest dose to minimize anxiousness.  Low dose Lexapro 5mg po daily started with minimization of Xanax use.  Patient to follow up with outpatient psychiatrist upon discharge.                 Mental Status Examination:  General Appearance-small statured bald elderly male sitting up in chair with O2 via NC intact.  -hygiene/grooming-good  Behavior-pleasant  Attitude/ [id number]  -eye contact-good  Speech-normal rate, rhythm & volume  Thought Process-linear, goal directed  Thought Content-no perceptual disturbances  -self-harm or aggressive thoughts-denies  Mood-mildly dysphoric  Affect-mildly anxious  Cognition-alert, oriented x 3  Impulse Control-good  Insight/Judgment-good    Medication Changes-  ALBUTerol/ipratropium for Nebulization 3 milliLiter(s) Nebulizer every 20 minutes  ALBUTerol/ipratropium for Nebulization 3 milliLiter(s) Nebulizer every 6 hours  ALPRAZolam 0.25 milliGRAM(s) Oral every 8 hours PRN  atorvastatin 20 milliGRAM(s) Oral at bedtime  azithromycin  IVPB 500 milliGRAM(s) IV Intermittent every 24 hours  clopidogrel Tablet 75 milliGRAM(s) Oral daily  escitalopram 5 milliGRAM(s) Oral daily  finasteride 5 milliGRAM(s) Oral daily  heparin  Injectable 5000 Unit(s) SubCutaneous every 8 hours  levothyroxine 50 MICROGram(s) Oral daily  montelukast 10 milliGRAM(s) Oral daily  pantoprazole    Tablet 40 milliGRAM(s) Oral before breakfast  phenytoin   Capsule 100 milliGRAM(s) Oral two times a day  predniSONE   Tablet 20 milliGRAM(s) Oral daily  sodium chloride 0.65% Nasal 1 Spray(s) Both Nostrils every 6 hours PRN  tamsulosin 0.4 milliGRAM(s) Oral at bedtime      Studies:    Laboratory testing-                        10.0   5.5   )-----------( 124      ( 28 Dec 2018 07:05 )             32.5     12-28    139  |  103  |  32<H>  ----------------------------<  83  4.2   |  37<H>  |  0.95    Ca    9.2      28 Dec 2018 07:05  Phos  3.9     12-27  Mg     2.1     12-27    Hospital course Follow-up  Assessment: General Anxiety Disorder    Continue Lexapro 5mg po daily.  Xanax 0.25 mg po Q8h prn anxiousness.  Patient & family in agreement with minimization of Xanax use.  Will follow up with outpatient psychiatrist upon discharge. Psychiatry Consultation-Liaison Follow-up Note  83y  Encounter: 12/28/2018  Chart reviewed,  	  Met with-patient  Case Discussed with-Nursing    Identifying Data: 82yo  [place] male domiciled with wife of 59 years in an apartment.  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    History of Present Illness:  83M hx of CAD s/p stent 6/2017, CVA/TIA with no residual deficits, sz d/o, RA on Pred 5mg maintenance,  COPD, diastolic CHF, hx of esophageal ca s/p chemo/RT and esophagectomy complicated with strictures requiring dilatation brought to the hospital for increased lethargy. As per wife patient was awake but kept falling asleep. Denies any fevers or chills reported in the ED. No reported headache, focal weakness, chest pain, palpitations, shortness of breath, abdominal pain, nausea, vomiting, diarrhea or dysuria.  In the ED patient found to be in hypercapnic likely secondary to COPD exacerbation. Patient was placed on AVAPS. Appears lethargic at time of evaluation. Wife at bedside, expressed limited medical interventions. (25 Dec 2018 18:17)    Symptoms and concerns- Psychiatry consulted for evaluation of anxiety in setting of COPD.  Patient presents alert, oriented x 3 with family at bedside.  Patient reports long history of anxiousness, has been seeing Dr Kalina Gibbs, Psychiatrist for twenty years.  Spoke with Dr Gibbs regarding pulmonologist's recommendations to minimize Xanax use.  Dr Gibbs feels medication is helpful to patient and would maintain medication at lowest dose to minimize anxiousness.  Low dose Lexapro 5mg po daily started with minimization of Xanax use.  Patient to follow up with outpatient psychiatrist upon discharge.                 Mental Status Examination:  General Appearance-small statured bald elderly male sitting up in chair with O2 via NC intact.  -hygiene/grooming-good  Behavior-pleasant  Attitude/ Wsakitnyiwfwf-[id number]  -eye contact-good  Speech-normal rate, rhythm & volume  Thought Process-linear, goal directed  Thought Content-no perceptual disturbances  -self-harm or aggressive thoughts-denies  Mood-mildly dysphoric  Affect-mildly anxious  Cognition-alert, oriented x 3  Impulse Control-good  Insight/Judgment-good    Medication Changes-  ALBUTerol/ipratropium for Nebulization 3 milliLiter(s) Nebulizer every 20 minutes  ALBUTerol/ipratropium for Nebulization 3 milliLiter(s) Nebulizer every 6 hours  ALPRAZolam 0.25 milliGRAM(s) Oral every 8 hours PRN  atorvastatin 20 milliGRAM(s) Oral at bedtime  azithromycin  IVPB 500 milliGRAM(s) IV Intermittent every 24 hours  clopidogrel Tablet 75 milliGRAM(s) Oral daily  escitalopram 5 milliGRAM(s) Oral daily  finasteride 5 milliGRAM(s) Oral daily  heparin  Injectable 5000 Unit(s) SubCutaneous every 8 hours  levothyroxine 50 MICROGram(s) Oral daily  montelukast 10 milliGRAM(s) Oral daily  pantoprazole    Tablet 40 milliGRAM(s) Oral before breakfast  phenytoin   Capsule 100 milliGRAM(s) Oral two times a day  predniSONE   Tablet 20 milliGRAM(s) Oral daily  sodium chloride 0.65% Nasal 1 Spray(s) Both Nostrils every 6 hours PRN  tamsulosin 0.4 milliGRAM(s) Oral at bedtime      Studies:    Laboratory testing-                        10.0   5.5   )-----------( 124      ( 28 Dec 2018 07:05 )             32.5     12-28    139  |  103  |  32<H>  ----------------------------<  83  4.2   |  37<H>  |  0.95    Ca    9.2      28 Dec 2018 07:05  Phos  3.9     12-27  Mg     2.1     12-27    Vital Signs Last 24 Hrs  T(F): 97.4 (28 Dec 2018 06:06), Max: 97.6 (27 Dec 2018 20:45)  HR: 72 (28 Dec 2018 06:06) (72 - 85)  BP: 96/57 (28 Dec 2018 06:06) (96/57 - 117/72)  RR: 14 (28 Dec 2018 06:06) (14 - 15)  SpO2: 100% (28 Dec 2018 06:06) (91% - 100%)    Hospital course Follow-up  Assessment: General Anxiety Disorder    Continue Lexapro 5mg po daily.  Xanax 0.25 mg po Q8h prn anxiousness.  Patient & family in agreement with minimization of Xanax use.  Will follow up with outpatient psychiatrist upon discharge.

## 2018-12-30 NOTE — DISCHARGE NOTE ADULT - PATIENT PORTAL LINK FT
Progress Note - Cardiology Office  [facility]    Donna Merritt 76 y o  female MRN: [id number]  : 1944  Encounter: [id number]      Assessment:     1  Arteriosclerotic coronary artery disease    2  Benign essential hypertension    3  Mixed hyperlipidemia    4  Generalized anxiety disorder    5  Hiatal hernia status post EsophyX TIF    6  Atypical chest pain        Discussion Summary and Plan:    1  Atypical reproducible chest pain most likely secondary to GI procedure  No symptoms of shortness of breath  Mostly chest soreness  Advised to consider taking vitamin-D levels also and follow up with GI     2  Coronary artery disease  Status post cardiac catheterization twice in  and  with mild nonobstructive coronary artery disease continue aspirin and statins  She has no symptoms of angina  3  Dyslipidemia  She has decrease taking her cholesterol medications as she was having muscle cramps  She follows up with PMD cholesterol dose was decreased  She is tolerating it very well at a lower dose  4  Hypothyroidism  On Synthroid  TSH is acceptable as per patient    5  History of DJD/back pain and fibromyalgia  Many of her medication was discontinued as she was getting memory loss  She was started on Aricept  She is doing very well  6  Anxiety  Patient was reassured that her symptoms of less likely to be cardiac in origin  EKG shows no changes  7  Large hiatal hernia status post procedure by endoscopy  Follow up with GI  Please call [phone], if any questions  Counseling :  A description of the counseling  Goals and Barriers  Patient's ability to self care: Yes  Medication side effect reviewed with patient in detail and all their questions answered to their satisfaction  HPI :     Donna Merritt is a 76y o  year old female who came for follow up   Patient has with a past medical history significant for mild nonobstructive coronary artery disease cardiac catheterization, dyslipidemia, back pain, asthma who came to see us for routine follow-up  Patient complains of some atypical chest pain which had one episode a few weeks ago with no relation to exercise  Patient is pretty active and walks every day with no symptoms of any chest pain or any shortness of breath  She has no fever no chills no nausea no vomiting no other significant complaint     01/03/2019  Above reviewed  Patient is doing well other than lead she is developing memory loss     She is doing well from cardiac point of view  She has had hiatal hernia and just had a procedure done endoscopically to fix it  She does have medical problem dated to nonobstructive coronary artery disease dyslipidemia back pain asthma and now she is not taking any inhalers as she is not wheezing anymore  No fever no chills no nausea no vomiting no other significant issues  Review of Systems   Constitutional: Negative for activity change, chills, diaphoresis, fever and unexpected weight change  HENT: Negative for congestion  Eyes: Negative for discharge and redness  Respiratory: Negative for cough, chest tightness, shortness of breath and wheezing  Cardiovascular: Negative  Negative for chest pain, palpitations and leg swelling  Gastrointestinal: Negative for abdominal pain, diarrhea and nausea  Hiatal hernia status post endoscopic procedure  Endocrine: Negative  Genitourinary: Negative for decreased urine volume and urgency  Musculoskeletal: Negative  Negative for arthralgias, back pain and gait problem  Skin: Negative for rash and wound  Allergic/Immunologic: Negative  Neurological: Negative for dizziness, seizures, syncope, weakness, light-headedness and headaches  Hematological: Negative  Psychiatric/Behavioral: Negative for agitation and confusion  The patient is not nervous/anxious           Has early memory loss       Historical Information   Past Medical History: Diagnosis Date    Allergic rhinitis     Last Assessed:10/22/2014    Alzheimer disease     Angina pectoris (HCC)     Ankle fracture, right     casted    Anxiety     Arthritis     Asthma     Cisneros esophagus     Bursitis     Constipation     Cystitis     chronic    Diverticulosis     Fibromyalgia     Fibromyalgia, primary     GERD (gastroesophageal reflux disease)     H/o Lyme disease     Hiatal hernia     History of Clostridium difficile infection     Hyperlipidemia     Hypothyroid     hypo    IBS (irritable bowel syndrome)     Labral tear of shoulder     left    Lichen sclerosus of female genitalia 2016    Murmur     Neck pain, chronic     gets steroid injections-none since late 2016    Neuralgia     Oral lichen planus     Peritonitis (HCC)     Spinal stenosis     Ulcer     Ulcer of gastric fundus     Wears glasses     Wears partial dentures     upper and lower     Past Surgical History:   Procedure Laterality Date    ABDOMINAL SURGERY      exploratory-removal of appendix    APPENDECTOMY      CARDIAC CATHETERIZATION      CARPAL TUNNEL RELEASE Bilateral     CHOLECYSTECTOMY      COLONOSCOPY      sigmoid diverticulosis,5mm rectal tubular adenoma5/06    DILATION AND CURETTAGE OF UTERUS      x3 miscarriages    FOOT SURGERY Right     5th toe-hammertoe repair    FOOT SURGERY      HIATAL HERNIA REPAIR      HYSTERECTOMY  01/09/2012    complete ; for bleeding,tubal ligation,vaginal prolapse and rectocele repair    OVARIAN CYST REMOVAL      NJ COLONOSCOPY FLX DX W/COLLJ SPEC WHEN PFRMD N/A 10/23/2017    Procedure: EGD AND COLONOSCOPY;  Surgeon: Judy Serrano MD;  Location: [facility];   Service: Gastroenterology    NJ ROSANAR Lizbeth Gonsalez Left 11/13/2017    Procedure: SHOULDER ARTHROSCOPY WITH SUBACROMIAL DECOMPRESSION, ROTATOR CUFF REPAIR;  Surgeon: Janeth Antonio MD;  Location[facility] MAIN [facility];  Service: Orthopedics    ROTATOR CUFF REPAIR Right     TONSILLECTOMY       History   Alcohol Use No     Comment: stopped drinking 9 years ago     History   Drug Use No     History   Smoking Status    Never Smoker   Smokeless Tobacco    Never Used     Family History:   Family History   Problem Relation Age of Onset    Cancer Mother         colon    Ulcerative colitis Mother     Alzheimer's disease Father     Heart disease Sister         MI x4-angioplasty x4 stents    Colonic polyp Sister     Ulcerative colitis Sister     Cancer Brother         brain tumor-age 6    Cancer Brother 72        prostate    Arthritis Family     Osteoarthritis Family     Crohn's disease Neg Hx     Liver cancer Neg Hx        Meds/Allergies     Allergies   Allergen Reactions    Omnipaque [Iohexol] Other (See Comments)     Shakes, "passed out"    Pneumovax 23 [Pneumococcal Vac Polyvalent] Hives    Iodine     Iv Dye  [Iodinated Diagnostic Agents] Confusion     Annotation - [id number]: shaking    Aspirin GI Intolerance and Nausea Only     Reaction Date: [id number]; Annotation - [id number]: 325mg causes bleeding  Cannot take a dose higher than 81mg-pt has a hx of ulcer    Codeine GI Intolerance     N/V    Flexeril [Cyclobenzaprine]      Unknown reaction per pt   Allergy was noted on physicians note    Latex Rash    Other Rash     Adhesive Tape-caused a rash       Current Outpatient Prescriptions:     albuterol (ACCUNEB) 0 63 MG/3ML nebulizer solution, Take 1 ampule by nebulization every 6 (six) hours as needed for wheezing, Disp: , Rfl:     albuterol (PROVENTIL HFA,VENTOLIN HFA) 90 mcg/act inhaler, Inhale 2 puffs every 6 (six) hours as needed for wheezing , Disp: , Rfl:     aspirin 81 mg chewable tablet, Chew 81 mg every morning  , Disp: , Rfl:     atorvastatin (LIPITOR) 20 mg tablet, Take 20 mg by mouth daily, Disp: , Rfl:     docusate sodium (COLACE) 100 mg capsule, Take 100 mg by mouth daily, Disp: , Rfl:     donepezil (ARICEPT) 5 mg tablet, Take 5 mg by mouth daily, Disp: , Rfl: 5    iron polysaccharides (FERREX 150) 150 mg capsule, Take 150 mg by mouth daily  , Disp: , Rfl:     levothyroxine 100 mcg tablet, Take 100 mcg by mouth daily, Disp: , Rfl:     melatonin 3 mg, Take 3 mg by mouth daily at bedtime as needed, Disp: , Rfl:     multivitamin (THERAGRAN) TABS, Take 1 tablet by mouth every morning, Disp: , Rfl:     pantoprazole (PROTONIX) 40 mg tablet, Take 40 mg by mouth daily, Disp: , Rfl:     PARoxetine (PAXIL) 10 mg tablet, Take 10 mg by mouth daily, Disp: , Rfl:     Vitals: Blood pressure 124/76, pulse 62, height 5' (1 524 m), weight 62 6 kg (138 lb), SpO2 97 %, not currently breastfeeding  Body mass index is 26 95 kg/m²  Vitals:    01/03/19 1326   Weight: 62 6 kg (138 lb)     BP Readings from Last 3 Encounters:   01/03/19 124/76   01/02/19 133/72   11/08/18 (!) 174/77       Physical Exam:  Physical Exam    Neurologic:  Alert & oriented x 3, no new focal deficits, Not in any acute distress,  Constitutional:  Well developed, well nourished, non-toxic appearance   Eyes:  Pupil equal and reacting to light, conjunctiva normal,   HENT:  Atraumatic, oropharynx moist, Neck- normal range of motion, no tenderness,  Neck supple, No JVP, No LNP   Respiratory:  Bilateral air entry, mostly clear to auscultation  Cardiovascular: S1-S2 regular with a 2/6 ejection systolic murmur and S4 is present  GI:  Soft, nondistended, normal bowel sounds, nontender, no hepatosplenomegaly appreciated  Musculoskeletal:  No edema, no tenderness, no deformities  Skin:  Well hydrated, no rash   Lymphatic:  No lymphadenopathy noted   Extremities:  No edema and distal pulses are present    Diagnostic Studies Review Cardio:    Stress Test: Nuclear stress test done in 2012 at by Dr Harika Guido shows mild to moderate inferior lateral ischemia EF was normal  Catheter done in 2012 and 14 shows no evidence of obstructive coronary artery disease       Echocardiogram/FACUNDO: Echo Doppler done in 2011 shows EF 60%, trace MR, trace TR  Catheterization: Cardiac catheterization done in August 2014 shows mild nonobstructive 25-30% stenosis with normal LV systolic function  was no different than cardiac catheter patient done in 2012  Vascular imaging: Vascular study done in April 2015 shows no significant ICA disease  ECG Report: Twelve-lead EKG done 4/7/2017 shows normal sinus heart rate 76 bpm  No cigarette ST changes  Twelve lead EKG done 01/03/2019 shows normal sinus rhythm heart rate 62 beats per minute  No significant ST changes  No change from old EKG        Lab Review   Lab Results   Component Value Date    WBC 6 50 03/13/2018    HGB 11 5 (L) 03/13/2018    HCT 36 4 (L) 03/13/2018    MCV 77 (L) 03/13/2018    RDW 15 4 (H) 03/13/2018     03/13/2018     BMP:  Lab Results   Component Value Date    SODIUM 143 03/13/2018    K 3 9 03/13/2018     03/13/2018    CO2 27 03/13/2018    ANIONGAP 13 4 12/28/2015    BUN 14 03/13/2018    CREATININE 0 68 03/13/2018    GLUCOSE 98 12/28/2015    GLUF 108 (H) 03/13/2018    CALCIUM 9 1 03/13/2018    EGFR 87 03/13/2018     LFT:  Lab Results   Component Value Date    AST 11 03/13/2018    ALT 17 03/13/2018    ALKPHOS 93 03/13/2018    TP 6 7 03/13/2018    ALB 3 5 03/13/2018      Lab Results   Component Value Date    RIH3GIJFLQGN 1 641 03/13/2018     Lipid Profile:   Lab Results   Component Value Date    CHOLESTEROL 170 03/13/2018    HDL 45 03/13/2018    LDLCALC 88 03/13/2018    TRIG 183 (H) 03/13/2018     Lab Results   Component Value Date    CHOLESTEROL 170 03/13/2018    CHOLESTEROL 150 07/07/2016     Lab Results   Component Value Date    CKTOTAL 77 10/06/2017         Dr Paul Borden MD [facility]      "This note has been constructed using a voice recognition system  Therefore there may be syntax, spelling, and/or grammatical errors   Please call if you have any questions  " You can access the [facility] Patient Portal, offered by [facility], by registering with the following website: [email or web address]

## 2019-01-01 ENCOUNTER — APPOINTMENT (OUTPATIENT)
Dept: CV DIAGNOSITCS | Facility: HOSPITAL | Age: 84
End: 2019-01-01

## 2019-01-01 ENCOUNTER — APPOINTMENT (OUTPATIENT)
Dept: CT IMAGING | Facility: HOSPITAL | Age: 84
End: 2019-01-01
Payer: MEDICARE

## 2019-01-01 ENCOUNTER — RX RENEWAL (OUTPATIENT)
Age: 84
End: 2019-01-01

## 2019-01-01 ENCOUNTER — TRANSCRIPTION ENCOUNTER (OUTPATIENT)
Age: 84
End: 2019-01-01

## 2019-01-01 ENCOUNTER — APPOINTMENT (OUTPATIENT)
Dept: PHARMACY | Facility: CLINIC | Age: 84
End: 2019-01-01
Payer: SELF-PAY

## 2019-01-01 ENCOUNTER — APPOINTMENT (OUTPATIENT)
Dept: THORACIC SURGERY | Facility: CLINIC | Age: 84
End: 2019-01-01
Payer: MEDICARE

## 2019-01-01 ENCOUNTER — OUTPATIENT (OUTPATIENT)
Dept: OUTPATIENT SERVICES | Facility: HOSPITAL | Age: 84
LOS: 1 days | End: 2019-01-01
Payer: MEDICARE

## 2019-01-01 ENCOUNTER — APPOINTMENT (OUTPATIENT)
Dept: PULMONOLOGY | Facility: CLINIC | Age: 84
End: 2019-01-01
Payer: MEDICARE

## 2019-01-01 ENCOUNTER — INPATIENT (INPATIENT)
Facility: HOSPITAL | Age: 84
LOS: 3 days | Discharge: NURSING, REHAB OR LONG-TERM CARE | End: 2019-12-30
Attending: THORACIC SURGERY (CARDIOTHORACIC VASCULAR SURGERY) | Admitting: THORACIC SURGERY (CARDIOTHORACIC VASCULAR SURGERY)
Payer: MEDICARE

## 2019-01-01 ENCOUNTER — APPOINTMENT (OUTPATIENT)
Dept: PULMONOLOGY | Facility: CLINIC | Age: 84
End: 2019-01-01

## 2019-01-01 ENCOUNTER — INPATIENT (INPATIENT)
Facility: HOSPITAL | Age: 84
LOS: 1 days | Discharge: NURSING, REHAB OR LONG-TERM CARE | End: 2019-12-19
Attending: HOSPITALIST | Admitting: HOSPITALIST
Payer: MEDICARE

## 2019-01-01 ENCOUNTER — INPATIENT (INPATIENT)
Facility: HOSPITAL | Age: 84
LOS: 2 days | Discharge: ANOTHER HOSPITAL | DRG: 535 | End: 2019-12-17
Attending: INTERNAL MEDICINE | Admitting: HOSPITALIST
Payer: MEDICARE

## 2019-01-01 ENCOUNTER — INPATIENT (INPATIENT)
Facility: HOSPITAL | Age: 84
LOS: 1 days | Discharge: HOME | End: 2019-02-13
Attending: THORACIC SURGERY (CARDIOTHORACIC VASCULAR SURGERY) | Admitting: THORACIC SURGERY (CARDIOTHORACIC VASCULAR SURGERY)
Payer: MEDICARE

## 2019-01-01 ENCOUNTER — INBOUND DOCUMENT (OUTPATIENT)
Age: 84
End: 2019-01-01

## 2019-01-01 ENCOUNTER — FORM ENCOUNTER (OUTPATIENT)
Age: 84
End: 2019-01-01

## 2019-01-01 ENCOUNTER — APPOINTMENT (OUTPATIENT)
Dept: CARDIOTHORACIC SURGERY | Facility: CLINIC | Age: 84
End: 2019-01-01

## 2019-01-01 ENCOUNTER — INPATIENT (INPATIENT)
Facility: HOSPITAL | Age: 84
LOS: 0 days | Discharge: HOME | End: 2019-08-20
Attending: STUDENT IN AN ORGANIZED HEALTH CARE EDUCATION/TRAINING PROGRAM | Admitting: STUDENT IN AN ORGANIZED HEALTH CARE EDUCATION/TRAINING PROGRAM
Payer: MEDICARE

## 2019-01-01 ENCOUNTER — APPOINTMENT (OUTPATIENT)
Dept: THORACIC SURGERY | Facility: HOSPITAL | Age: 84
End: 2019-01-01

## 2019-01-01 ENCOUNTER — OUTPATIENT (OUTPATIENT)
Dept: OUTPATIENT SERVICES | Facility: HOSPITAL | Age: 84
LOS: 1 days | Discharge: HOME | End: 2019-01-01
Payer: MEDICARE

## 2019-01-01 VITALS
SYSTOLIC BLOOD PRESSURE: 145 MMHG | WEIGHT: 118 LBS | RESPIRATION RATE: 20 BRPM | TEMPERATURE: 97.8 F | BODY MASS INDEX: 20.91 KG/M2 | DIASTOLIC BLOOD PRESSURE: 71 MMHG | HEIGHT: 63 IN | HEART RATE: 64 BPM | OXYGEN SATURATION: 95 %

## 2019-01-01 VITALS
TEMPERATURE: 97 F | RESPIRATION RATE: 18 BRPM | HEART RATE: 72 BPM | SYSTOLIC BLOOD PRESSURE: 126 MMHG | OXYGEN SATURATION: 97 % | DIASTOLIC BLOOD PRESSURE: 60 MMHG

## 2019-01-01 VITALS
HEART RATE: 76 BPM | SYSTOLIC BLOOD PRESSURE: 124 MMHG | BODY MASS INDEX: 21.26 KG/M2 | HEIGHT: 63 IN | RESPIRATION RATE: 17 BRPM | OXYGEN SATURATION: 95 % | WEIGHT: 120 LBS | DIASTOLIC BLOOD PRESSURE: 68 MMHG

## 2019-01-01 VITALS
RESPIRATION RATE: 17 BRPM | HEART RATE: 74 BPM | WEIGHT: 117 LBS | HEIGHT: 63 IN | DIASTOLIC BLOOD PRESSURE: 80 MMHG | SYSTOLIC BLOOD PRESSURE: 120 MMHG | BODY MASS INDEX: 20.73 KG/M2 | OXYGEN SATURATION: 92 %

## 2019-01-01 VITALS
DIASTOLIC BLOOD PRESSURE: 64 MMHG | HEART RATE: 70 BPM | SYSTOLIC BLOOD PRESSURE: 107 MMHG | RESPIRATION RATE: 24 BRPM | OXYGEN SATURATION: 100 %

## 2019-01-01 VITALS
HEART RATE: 82 BPM | RESPIRATION RATE: 18 BRPM | WEIGHT: 117.07 LBS | OXYGEN SATURATION: 97 % | DIASTOLIC BLOOD PRESSURE: 73 MMHG | SYSTOLIC BLOOD PRESSURE: 126 MMHG | TEMPERATURE: 98 F | HEIGHT: 63 IN

## 2019-01-01 VITALS
WEIGHT: 117 LBS | RESPIRATION RATE: 17 BRPM | HEIGHT: 63 IN | OXYGEN SATURATION: 92 % | SYSTOLIC BLOOD PRESSURE: 120 MMHG | BODY MASS INDEX: 20.73 KG/M2 | HEART RATE: 52 BPM | DIASTOLIC BLOOD PRESSURE: 70 MMHG

## 2019-01-01 VITALS
HEIGHT: 63 IN | OXYGEN SATURATION: 94 % | DIASTOLIC BLOOD PRESSURE: 80 MMHG | BODY MASS INDEX: 21.09 KG/M2 | SYSTOLIC BLOOD PRESSURE: 120 MMHG | HEART RATE: 77 BPM | RESPIRATION RATE: 17 BRPM | WEIGHT: 119 LBS

## 2019-01-01 VITALS
OXYGEN SATURATION: 95 % | HEART RATE: 73 BPM | SYSTOLIC BLOOD PRESSURE: 110 MMHG | RESPIRATION RATE: 17 BRPM | DIASTOLIC BLOOD PRESSURE: 56 MMHG | HEIGHT: 63 IN | BODY MASS INDEX: 20.6 KG/M2 | WEIGHT: 116.25 LBS

## 2019-01-01 VITALS
OXYGEN SATURATION: 100 % | RESPIRATION RATE: 18 BRPM | HEART RATE: 76 BPM | DIASTOLIC BLOOD PRESSURE: 58 MMHG | TEMPERATURE: 98 F | WEIGHT: 116.84 LBS | SYSTOLIC BLOOD PRESSURE: 109 MMHG

## 2019-01-01 VITALS
SYSTOLIC BLOOD PRESSURE: 130 MMHG | HEIGHT: 63 IN | DIASTOLIC BLOOD PRESSURE: 80 MMHG | WEIGHT: 116 LBS | BODY MASS INDEX: 20.55 KG/M2 | HEART RATE: 68 BPM | RESPIRATION RATE: 16 BRPM | OXYGEN SATURATION: 95 %

## 2019-01-01 VITALS
RESPIRATION RATE: 16 BRPM | OXYGEN SATURATION: 99 % | HEART RATE: 65 BPM | TEMPERATURE: 98 F | SYSTOLIC BLOOD PRESSURE: 151 MMHG | DIASTOLIC BLOOD PRESSURE: 77 MMHG

## 2019-01-01 VITALS
HEIGHT: 67 IN | TEMPERATURE: 97 F | WEIGHT: 129.41 LBS | OXYGEN SATURATION: 94 % | RESPIRATION RATE: 18 BRPM | SYSTOLIC BLOOD PRESSURE: 137 MMHG | HEART RATE: 78 BPM | DIASTOLIC BLOOD PRESSURE: 58 MMHG

## 2019-01-01 VITALS
RESPIRATION RATE: 15 BRPM | DIASTOLIC BLOOD PRESSURE: 60 MMHG | HEART RATE: 52 BPM | HEIGHT: 63 IN | BODY MASS INDEX: 20.45 KG/M2 | WEIGHT: 115.38 LBS | SYSTOLIC BLOOD PRESSURE: 108 MMHG

## 2019-01-01 VITALS
HEIGHT: 63 IN | SYSTOLIC BLOOD PRESSURE: 102 MMHG | OXYGEN SATURATION: 93 % | BODY MASS INDEX: 20.2 KG/M2 | RESPIRATION RATE: 18 BRPM | HEART RATE: 82 BPM | WEIGHT: 114 LBS | DIASTOLIC BLOOD PRESSURE: 60 MMHG

## 2019-01-01 VITALS — HEART RATE: 75 BPM | OXYGEN SATURATION: 96 %

## 2019-01-01 VITALS
OXYGEN SATURATION: 95 % | SYSTOLIC BLOOD PRESSURE: 159 MMHG | WEIGHT: 117.95 LBS | HEART RATE: 62 BPM | RESPIRATION RATE: 16 BRPM | TEMPERATURE: 98 F | DIASTOLIC BLOOD PRESSURE: 69 MMHG | HEIGHT: 63 IN

## 2019-01-01 VITALS
SYSTOLIC BLOOD PRESSURE: 119 MMHG | OXYGEN SATURATION: 91 % | HEART RATE: 73 BPM | TEMPERATURE: 97 F | RESPIRATION RATE: 18 BRPM | DIASTOLIC BLOOD PRESSURE: 64 MMHG

## 2019-01-01 VITALS — TEMPERATURE: 97 F

## 2019-01-01 DIAGNOSIS — R04.2 HEMOPTYSIS: ICD-10-CM

## 2019-01-01 DIAGNOSIS — Z95.5 PRESENCE OF CORONARY ANGIOPLASTY IMPLANT AND GRAFT: Chronic | ICD-10-CM

## 2019-01-01 DIAGNOSIS — Z90.89 ACQUIRED ABSENCE OF OTHER ORGANS: Chronic | ICD-10-CM

## 2019-01-01 DIAGNOSIS — M12.9 ARTHROPATHY, UNSPECIFIED: Chronic | ICD-10-CM

## 2019-01-01 DIAGNOSIS — Z98.890 OTHER SPECIFIED POSTPROCEDURAL STATES: Chronic | ICD-10-CM

## 2019-01-01 DIAGNOSIS — K91.89 OTHER POSTPROCEDURAL COMPLICATIONS AND DISORDERS OF DIGESTIVE SYSTEM: Chronic | ICD-10-CM

## 2019-01-01 DIAGNOSIS — R13.10 DYSPHAGIA, UNSPECIFIED: ICD-10-CM

## 2019-01-01 DIAGNOSIS — Z96.649 PRESENCE OF UNSPECIFIED ARTIFICIAL HIP JOINT: Chronic | ICD-10-CM

## 2019-01-01 DIAGNOSIS — J44.9 CHRONIC OBSTRUCTIVE PULMONARY DISEASE, UNSPECIFIED: ICD-10-CM

## 2019-01-01 DIAGNOSIS — I72.4 ANEURYSM OF ARTERY OF LOWER EXTREMITY: ICD-10-CM

## 2019-01-01 DIAGNOSIS — N40.0 BENIGN PROSTATIC HYPERPLASIA WITHOUT LOWER URINARY TRACT SYMPTOMS: ICD-10-CM

## 2019-01-01 DIAGNOSIS — C15.5 MALIGNANT NEOPLASM OF LOWER THIRD OF ESOPHAGUS: ICD-10-CM

## 2019-01-01 DIAGNOSIS — S32.409A UNSPECIFIED FRACTURE OF UNSPECIFIED ACETABULUM, INITIAL ENCOUNTER FOR CLOSED FRACTURE: ICD-10-CM

## 2019-01-01 DIAGNOSIS — C15.9 MALIGNANT NEOPLASM OF ESOPHAGUS, UNSPECIFIED: ICD-10-CM

## 2019-01-01 DIAGNOSIS — S32.599A OTHER SPECIFIED FRACTURE OF UNSPECIFIED PUBIS, INITIAL ENCOUNTER FOR CLOSED FRACTURE: ICD-10-CM

## 2019-01-01 DIAGNOSIS — K22.2 ESOPHAGEAL OBSTRUCTION: ICD-10-CM

## 2019-01-01 DIAGNOSIS — S32.591A OTHER SPECIFIED FRACTURE OF RIGHT PUBIS, INITIAL ENCOUNTER FOR CLOSED FRACTURE: ICD-10-CM

## 2019-01-01 DIAGNOSIS — R06.02 SHORTNESS OF BREATH: ICD-10-CM

## 2019-01-01 DIAGNOSIS — I25.10 ATHEROSCLEROTIC HEART DISEASE OF NATIVE CORONARY ARTERY WITHOUT ANGINA PECTORIS: ICD-10-CM

## 2019-01-01 DIAGNOSIS — R53.83 OTHER FATIGUE: ICD-10-CM

## 2019-01-01 DIAGNOSIS — R05 COUGH: ICD-10-CM

## 2019-01-01 DIAGNOSIS — I35.0 NONRHEUMATIC AORTIC (VALVE) STENOSIS: ICD-10-CM

## 2019-01-01 DIAGNOSIS — Z02.9 ENCOUNTER FOR ADMINISTRATIVE EXAMINATIONS, UNSPECIFIED: ICD-10-CM

## 2019-01-01 DIAGNOSIS — D64.9 ANEMIA, UNSPECIFIED: ICD-10-CM

## 2019-01-01 DIAGNOSIS — Z29.9 ENCOUNTER FOR PROPHYLACTIC MEASURES, UNSPECIFIED: ICD-10-CM

## 2019-01-01 DIAGNOSIS — I72.9 ANEURYSM OF UNSPECIFIED SITE: ICD-10-CM

## 2019-01-01 LAB
ALBUMIN SERPL ELPH-MCNC: 2.6 G/DL — LOW (ref 3.3–5)
ALBUMIN SERPL ELPH-MCNC: 2.8 G/DL — LOW (ref 3.3–5)
ALBUMIN SERPL ELPH-MCNC: 3.1 G/DL — LOW (ref 3.3–5)
ALBUMIN SERPL ELPH-MCNC: 3.1 G/DL — LOW (ref 3.3–5)
ALBUMIN SERPL ELPH-MCNC: 3.2 G/DL
ALBUMIN SERPL ELPH-MCNC: 3.4 G/DL — SIGNIFICANT CHANGE UP (ref 3.3–5)
ALP BLD-CCNC: 162 U/L
ALP SERPL-CCNC: 156 U/L — HIGH (ref 40–120)
ALP SERPL-CCNC: 176 U/L — HIGH (ref 40–120)
ALP SERPL-CCNC: 193 U/L — HIGH (ref 40–120)
ALP SERPL-CCNC: 197 U/L — HIGH (ref 40–120)
ALP SERPL-CCNC: 214 U/L — HIGH (ref 40–120)
ALT FLD-CCNC: 41 U/L — SIGNIFICANT CHANGE UP (ref 4–41)
ALT FLD-CCNC: 42 U/L — HIGH (ref 4–41)
ALT FLD-CCNC: 46 U/L — HIGH (ref 4–41)
ALT FLD-CCNC: 54 U/L — HIGH (ref 10–45)
ALT FLD-CCNC: 55 U/L — HIGH (ref 10–45)
ALT SERPL-CCNC: 50 U/L
ANION GAP SERPL CALC-SCNC: 10 MMO/L — SIGNIFICANT CHANGE UP (ref 7–14)
ANION GAP SERPL CALC-SCNC: 10 MMOL/L
ANION GAP SERPL CALC-SCNC: 11 MMO/L — SIGNIFICANT CHANGE UP (ref 7–14)
ANION GAP SERPL CALC-SCNC: 4 MMO/L — LOW (ref 7–14)
ANION GAP SERPL CALC-SCNC: 4 MMOL/L — LOW (ref 5–17)
ANION GAP SERPL CALC-SCNC: 4 MMOL/L — LOW (ref 5–17)
ANION GAP SERPL CALC-SCNC: 5 MMOL/L — SIGNIFICANT CHANGE UP (ref 5–17)
ANION GAP SERPL CALC-SCNC: 6 MMO/L — LOW (ref 7–14)
ANION GAP SERPL CALC-SCNC: 6 MMO/L — LOW (ref 7–14)
ANION GAP SERPL CALC-SCNC: 7 MMO/L — SIGNIFICANT CHANGE UP (ref 7–14)
ANION GAP SERPL CALC-SCNC: 7 MMOL/L — SIGNIFICANT CHANGE UP (ref 5–17)
ANION GAP SERPL CALC-SCNC: 8 MMO/L — SIGNIFICANT CHANGE UP (ref 7–14)
ANION GAP SERPL CALC-SCNC: 8 MMO/L — SIGNIFICANT CHANGE UP (ref 7–14)
ANION GAP SERPL CALC-SCNC: 9 MMO/L — SIGNIFICANT CHANGE UP (ref 7–14)
ANION GAP SERPL CALC-SCNC: 9 MMO/L — SIGNIFICANT CHANGE UP (ref 7–14)
APTT BLD: 27.2 SEC — LOW (ref 27.5–36.3)
APTT BLD: 29.4 SEC — SIGNIFICANT CHANGE UP (ref 27.5–36.3)
APTT BLD: 30.2 SEC — SIGNIFICANT CHANGE UP (ref 27.5–36.3)
APTT BLD: 30.6 SEC — SIGNIFICANT CHANGE UP (ref 27.5–36.3)
APTT BLD: 31.9 SEC — SIGNIFICANT CHANGE UP (ref 27.5–36.3)
AST SERPL-CCNC: 40 U/L — SIGNIFICANT CHANGE UP (ref 4–40)
AST SERPL-CCNC: 43 U/L — HIGH (ref 4–40)
AST SERPL-CCNC: 45 U/L
AST SERPL-CCNC: 48 U/L — HIGH (ref 10–40)
AST SERPL-CCNC: 51 U/L — HIGH (ref 10–40)
AST SERPL-CCNC: 78 U/L — HIGH (ref 4–40)
BASE EXCESS BLDA CALC-SCNC: 7.4 MMOL/L — SIGNIFICANT CHANGE UP
BASE EXCESS BLDA CALC-SCNC: 7.9 MMOL/L — SIGNIFICANT CHANGE UP
BASE EXCESS BLDA CALC-SCNC: 9.7 MMOL/L — SIGNIFICANT CHANGE UP
BASE EXCESS BLDV CALC-SCNC: 8.2 MMOL/L — SIGNIFICANT CHANGE UP
BASE EXCESS BLDV CALC-SCNC: 8.8 MMOL/L — SIGNIFICANT CHANGE UP
BASOPHILS # BLD AUTO: 0.01 K/UL — SIGNIFICANT CHANGE UP (ref 0–0.2)
BASOPHILS # BLD AUTO: 0.02 K/UL — SIGNIFICANT CHANGE UP (ref 0–0.2)
BASOPHILS # BLD AUTO: 0.03 K/UL — SIGNIFICANT CHANGE UP (ref 0–0.2)
BASOPHILS # BLD AUTO: 0.04 K/UL
BASOPHILS NFR BLD AUTO: 0.2 % — SIGNIFICANT CHANGE UP (ref 0–2)
BASOPHILS NFR BLD AUTO: 0.4 % — SIGNIFICANT CHANGE UP (ref 0–2)
BASOPHILS NFR BLD AUTO: 0.5 % — SIGNIFICANT CHANGE UP (ref 0–2)
BASOPHILS NFR BLD AUTO: 0.5 % — SIGNIFICANT CHANGE UP (ref 0–2)
BASOPHILS NFR BLD AUTO: 0.8 %
BILIRUB SERPL-MCNC: 0.2 MG/DL — SIGNIFICANT CHANGE UP (ref 0.2–1.2)
BILIRUB SERPL-MCNC: 0.3 MG/DL — SIGNIFICANT CHANGE UP (ref 0.2–1.2)
BILIRUB SERPL-MCNC: 0.4 MG/DL — SIGNIFICANT CHANGE UP (ref 0.2–1.2)
BILIRUB SERPL-MCNC: <0.2 MG/DL
BLD GP AB SCN SERPL QL: NEGATIVE — SIGNIFICANT CHANGE UP
BLD GP AB SCN SERPL QL: SIGNIFICANT CHANGE UP
BLOOD GAS ARTERIAL - FIO2: 40 — SIGNIFICANT CHANGE UP
BLOOD GAS ARTERIAL - FIO2: 40 — SIGNIFICANT CHANGE UP
BUN SERPL-MCNC: 11 MG/DL — SIGNIFICANT CHANGE UP (ref 7–23)
BUN SERPL-MCNC: 12 MG/DL — SIGNIFICANT CHANGE UP (ref 7–23)
BUN SERPL-MCNC: 12 MG/DL — SIGNIFICANT CHANGE UP (ref 7–23)
BUN SERPL-MCNC: 14 MG/DL — SIGNIFICANT CHANGE UP (ref 7–23)
BUN SERPL-MCNC: 14 MG/DL — SIGNIFICANT CHANGE UP (ref 7–23)
BUN SERPL-MCNC: 15 MG/DL — SIGNIFICANT CHANGE UP (ref 7–23)
BUN SERPL-MCNC: 16 MG/DL — SIGNIFICANT CHANGE UP (ref 7–23)
BUN SERPL-MCNC: 17 MG/DL
BUN SERPL-MCNC: 20 MG/DL — SIGNIFICANT CHANGE UP (ref 7–23)
BUN SERPL-MCNC: 9 MG/DL — SIGNIFICANT CHANGE UP (ref 7–23)
BUN SERPL-MCNC: 9 MG/DL — SIGNIFICANT CHANGE UP (ref 7–23)
CALCIUM SERPL-MCNC: 8.2 MG/DL — LOW (ref 8.4–10.5)
CALCIUM SERPL-MCNC: 8.4 MG/DL — SIGNIFICANT CHANGE UP (ref 8.4–10.5)
CALCIUM SERPL-MCNC: 8.5 MG/DL
CALCIUM SERPL-MCNC: 8.6 MG/DL — SIGNIFICANT CHANGE UP (ref 8.4–10.5)
CALCIUM SERPL-MCNC: 8.7 MG/DL — SIGNIFICANT CHANGE UP (ref 8.4–10.5)
CALCIUM SERPL-MCNC: 8.8 MG/DL — SIGNIFICANT CHANGE UP (ref 8.4–10.5)
CALCIUM SERPL-MCNC: 8.9 MG/DL — SIGNIFICANT CHANGE UP (ref 8.4–10.5)
CALCIUM SERPL-MCNC: 9 MG/DL — SIGNIFICANT CHANGE UP (ref 8.4–10.5)
CALCIUM SERPL-MCNC: 9 MG/DL — SIGNIFICANT CHANGE UP (ref 8.4–10.5)
CALCIUM SERPL-MCNC: 9.2 MG/DL — SIGNIFICANT CHANGE UP (ref 8.4–10.5)
CALCIUM SERPL-MCNC: 9.3 MG/DL — SIGNIFICANT CHANGE UP (ref 8.4–10.5)
CALCIUM SERPL-MCNC: 9.5 MG/DL — SIGNIFICANT CHANGE UP (ref 8.4–10.5)
CHLORIDE BLDA-SCNC: 101 MMOL/L — SIGNIFICANT CHANGE UP (ref 96–108)
CHLORIDE SERPL-SCNC: 100 MMOL/L — SIGNIFICANT CHANGE UP (ref 98–107)
CHLORIDE SERPL-SCNC: 100 MMOL/L — SIGNIFICANT CHANGE UP (ref 98–107)
CHLORIDE SERPL-SCNC: 101 MMOL/L — SIGNIFICANT CHANGE UP (ref 98–107)
CHLORIDE SERPL-SCNC: 101 MMOL/L — SIGNIFICANT CHANGE UP (ref 98–107)
CHLORIDE SERPL-SCNC: 102 MMOL/L — SIGNIFICANT CHANGE UP (ref 96–108)
CHLORIDE SERPL-SCNC: 103 MMOL/L
CHLORIDE SERPL-SCNC: 104 MMOL/L — SIGNIFICANT CHANGE UP (ref 98–107)
CHLORIDE SERPL-SCNC: 105 MMOL/L — SIGNIFICANT CHANGE UP (ref 96–108)
CHLORIDE SERPL-SCNC: 105 MMOL/L — SIGNIFICANT CHANGE UP (ref 96–108)
CHLORIDE SERPL-SCNC: 106 MMOL/L — SIGNIFICANT CHANGE UP (ref 96–108)
CHLORIDE SERPL-SCNC: 107 MMOL/L — SIGNIFICANT CHANGE UP (ref 98–107)
CHLORIDE SERPL-SCNC: 97 MMOL/L — LOW (ref 98–107)
CHLORIDE SERPL-SCNC: 98 MMOL/L — SIGNIFICANT CHANGE UP (ref 98–107)
CHLORIDE SERPL-SCNC: 99 MMOL/L — SIGNIFICANT CHANGE UP (ref 98–107)
CK MB BLD-MCNC: 6.56 NG/ML — SIGNIFICANT CHANGE UP (ref 1–6.6)
CK MB BLD-MCNC: 7.01 NG/ML — HIGH (ref 1–6.6)
CK MB BLD-MCNC: 7.93 NG/ML — HIGH (ref 1–6.6)
CK MB BLD-MCNC: SIGNIFICANT CHANGE UP (ref 0–2.5)
CK MB BLD-MCNC: SIGNIFICANT CHANGE UP (ref 0–2.5)
CK SERPL-CCNC: 51 U/L — SIGNIFICANT CHANGE UP (ref 30–200)
CK SERPL-CCNC: 58 U/L — SIGNIFICANT CHANGE UP (ref 30–200)
CK SERPL-CCNC: 61 U/L — SIGNIFICANT CHANGE UP (ref 30–200)
CO2 SERPL-SCNC: 27 MMOL/L — SIGNIFICANT CHANGE UP (ref 22–31)
CO2 SERPL-SCNC: 28 MMOL/L — SIGNIFICANT CHANGE UP (ref 22–31)
CO2 SERPL-SCNC: 29 MMOL/L — SIGNIFICANT CHANGE UP (ref 22–31)
CO2 SERPL-SCNC: 29 MMOL/L — SIGNIFICANT CHANGE UP (ref 22–31)
CO2 SERPL-SCNC: 30 MMOL/L — SIGNIFICANT CHANGE UP (ref 22–31)
CO2 SERPL-SCNC: 31 MMOL/L — SIGNIFICANT CHANGE UP (ref 22–31)
CO2 SERPL-SCNC: 31 MMOL/L — SIGNIFICANT CHANGE UP (ref 22–31)
CO2 SERPL-SCNC: 32 MMOL/L
CO2 SERPL-SCNC: 32 MMOL/L — HIGH (ref 22–31)
CO2 SERPL-SCNC: 32 MMOL/L — HIGH (ref 22–31)
CO2 SERPL-SCNC: 33 MMOL/L — HIGH (ref 22–31)
CO2 SERPL-SCNC: 33 MMOL/L — HIGH (ref 22–31)
CORTIS SERPL-MCNC: 11 UG/DL — SIGNIFICANT CHANGE UP (ref 2.7–18.4)
CREAT BLDA-MCNC: 0.63 MG/DL — SIGNIFICANT CHANGE UP (ref 0.5–1.3)
CREAT SERPL-MCNC: 0.6 MG/DL — SIGNIFICANT CHANGE UP (ref 0.5–1.3)
CREAT SERPL-MCNC: 0.63 MG/DL — SIGNIFICANT CHANGE UP (ref 0.5–1.3)
CREAT SERPL-MCNC: 0.65 MG/DL — SIGNIFICANT CHANGE UP (ref 0.5–1.3)
CREAT SERPL-MCNC: 0.67 MG/DL — SIGNIFICANT CHANGE UP (ref 0.5–1.3)
CREAT SERPL-MCNC: 0.71 MG/DL — SIGNIFICANT CHANGE UP (ref 0.5–1.3)
CREAT SERPL-MCNC: 0.77 MG/DL — SIGNIFICANT CHANGE UP (ref 0.5–1.3)
CREAT SERPL-MCNC: 0.77 MG/DL — SIGNIFICANT CHANGE UP (ref 0.5–1.3)
CREAT SERPL-MCNC: 0.78 MG/DL
CREAT SERPL-MCNC: 0.78 MG/DL — SIGNIFICANT CHANGE UP (ref 0.5–1.3)
CREAT SERPL-MCNC: 0.88 MG/DL — SIGNIFICANT CHANGE UP (ref 0.5–1.3)
CREAT SERPL-MCNC: 0.94 MG/DL — SIGNIFICANT CHANGE UP (ref 0.5–1.3)
EOSINOPHIL # BLD AUTO: 0.04 K/UL — SIGNIFICANT CHANGE UP (ref 0–0.5)
EOSINOPHIL # BLD AUTO: 0.07 K/UL — SIGNIFICANT CHANGE UP (ref 0–0.5)
EOSINOPHIL # BLD AUTO: 0.07 K/UL — SIGNIFICANT CHANGE UP (ref 0–0.5)
EOSINOPHIL # BLD AUTO: 0.08 K/UL — SIGNIFICANT CHANGE UP (ref 0–0.5)
EOSINOPHIL # BLD AUTO: 0.08 K/UL — SIGNIFICANT CHANGE UP (ref 0–0.5)
EOSINOPHIL # BLD AUTO: 0.12 K/UL — SIGNIFICANT CHANGE UP (ref 0–0.5)
EOSINOPHIL # BLD AUTO: 0.13 K/UL
EOSINOPHIL # BLD AUTO: 0.21 K/UL — SIGNIFICANT CHANGE UP (ref 0–0.5)
EOSINOPHIL NFR BLD AUTO: 0.6 % — SIGNIFICANT CHANGE UP (ref 0–6)
EOSINOPHIL NFR BLD AUTO: 0.8 % — SIGNIFICANT CHANGE UP (ref 0–6)
EOSINOPHIL NFR BLD AUTO: 1.2 % — SIGNIFICANT CHANGE UP (ref 0–6)
EOSINOPHIL NFR BLD AUTO: 1.3 % — SIGNIFICANT CHANGE UP (ref 0–6)
EOSINOPHIL NFR BLD AUTO: 1.5 % — SIGNIFICANT CHANGE UP (ref 0–6)
EOSINOPHIL NFR BLD AUTO: 2.6 %
EOSINOPHIL NFR BLD AUTO: 2.7 % — SIGNIFICANT CHANGE UP (ref 0–6)
EOSINOPHIL NFR BLD AUTO: 4.3 % — SIGNIFICANT CHANGE UP (ref 0–6)
FERRITIN SERPL-MCNC: 131 NG/ML — SIGNIFICANT CHANGE UP (ref 30–400)
FOLATE SERPL-MCNC: 11.5 NG/ML — SIGNIFICANT CHANGE UP
GAS PNL BLDV: 135 MMOL/L — LOW (ref 136–146)
GAS PNL BLDV: 136 MMOL/L — SIGNIFICANT CHANGE UP (ref 136–146)
GLUCOSE BLDA-MCNC: 94 MG/DL — SIGNIFICANT CHANGE UP (ref 70–99)
GLUCOSE BLDV-MCNC: 132 MG/DL — HIGH (ref 70–99)
GLUCOSE BLDV-MCNC: 83 MG/DL — SIGNIFICANT CHANGE UP (ref 70–99)
GLUCOSE SERPL-MCNC: 486 MG/DL — CRITICAL HIGH (ref 70–99)
GLUCOSE SERPL-MCNC: 50 MG/DL
GLUCOSE SERPL-MCNC: 55 MG/DL — LOW (ref 70–99)
GLUCOSE SERPL-MCNC: 62 MG/DL — LOW (ref 70–99)
GLUCOSE SERPL-MCNC: 63 MG/DL — LOW (ref 70–99)
GLUCOSE SERPL-MCNC: 66 MG/DL — LOW (ref 70–99)
GLUCOSE SERPL-MCNC: 72 MG/DL — SIGNIFICANT CHANGE UP (ref 70–99)
GLUCOSE SERPL-MCNC: 74 MG/DL — SIGNIFICANT CHANGE UP (ref 70–99)
GLUCOSE SERPL-MCNC: 74 MG/DL — SIGNIFICANT CHANGE UP (ref 70–99)
GLUCOSE SERPL-MCNC: 80 MG/DL — SIGNIFICANT CHANGE UP (ref 70–99)
GLUCOSE SERPL-MCNC: 82 MG/DL — SIGNIFICANT CHANGE UP (ref 70–99)
GLUCOSE SERPL-MCNC: 84 MG/DL — SIGNIFICANT CHANGE UP (ref 70–99)
GLUCOSE SERPL-MCNC: 86 MG/DL — SIGNIFICANT CHANGE UP (ref 70–99)
GLUCOSE SERPL-MCNC: 90 MG/DL — SIGNIFICANT CHANGE UP (ref 70–99)
GLUCOSE SERPL-MCNC: 96 MG/DL — SIGNIFICANT CHANGE UP (ref 70–99)
HBA1C BLD-MCNC: 5 % — SIGNIFICANT CHANGE UP (ref 4–5.6)
HCO3 BLDA-SCNC: 30 MMOL/L — HIGH (ref 22–26)
HCO3 BLDA-SCNC: 31 MMOL/L — HIGH (ref 22–26)
HCO3 BLDA-SCNC: 33 MMOL/L — HIGH (ref 22–26)
HCO3 BLDV-SCNC: 30 MMOL/L — HIGH (ref 20–27)
HCO3 BLDV-SCNC: 31 MMOL/L — HIGH (ref 20–27)
HCT VFR BLD CALC: 24 % — LOW (ref 39–50)
HCT VFR BLD CALC: 24 % — LOW (ref 39–50)
HCT VFR BLD CALC: 24.6 % — LOW (ref 39–50)
HCT VFR BLD CALC: 26.3 % — LOW (ref 39–50)
HCT VFR BLD CALC: 26.6 % — LOW (ref 39–50)
HCT VFR BLD CALC: 27 % — LOW (ref 39–50)
HCT VFR BLD CALC: 27 % — LOW (ref 39–50)
HCT VFR BLD CALC: 27.5 % — LOW (ref 39–50)
HCT VFR BLD CALC: 27.6 % — LOW (ref 39–50)
HCT VFR BLD CALC: 28.4 % — LOW (ref 39–50)
HCT VFR BLD CALC: 29.6 % — LOW (ref 39–50)
HCT VFR BLD CALC: 31 % — LOW (ref 39–50)
HCT VFR BLD CALC: 31.5 % — LOW (ref 39–50)
HCT VFR BLD CALC: 32.5 % — LOW (ref 39–50)
HCT VFR BLD CALC: 33.4 %
HCT VFR BLD CALC: 36.3 % — LOW (ref 39–50)
HCT VFR BLD CALC: 36.3 % — LOW (ref 39–50)
HCT VFR BLDA CALC: 27.7 % — LOW (ref 39–51)
HCT VFR BLDV CALC: 25.9 % — LOW (ref 39–51)
HCT VFR BLDV CALC: 26.8 % — LOW (ref 39–51)
HGB BLD-MCNC: 10.2 G/DL — LOW (ref 13–17)
HGB BLD-MCNC: 10.3 G/DL
HGB BLD-MCNC: 11.3 G/DL — LOW (ref 13–17)
HGB BLD-MCNC: 11.6 G/DL — LOW (ref 13–17)
HGB BLD-MCNC: 7.3 G/DL — LOW (ref 13–17)
HGB BLD-MCNC: 7.5 G/DL — LOW (ref 13–17)
HGB BLD-MCNC: 7.5 G/DL — LOW (ref 13–17)
HGB BLD-MCNC: 8 G/DL — LOW (ref 13–17)
HGB BLD-MCNC: 8.1 G/DL — LOW (ref 13–17)
HGB BLD-MCNC: 8.4 G/DL — LOW (ref 13–17)
HGB BLD-MCNC: 8.7 G/DL — LOW (ref 13–17)
HGB BLD-MCNC: 8.7 G/DL — LOW (ref 13–17)
HGB BLD-MCNC: 9.4 G/DL — LOW (ref 13–17)
HGB BLD-MCNC: 9.6 G/DL — LOW (ref 13–17)
HGB BLD-MCNC: 9.8 G/DL — LOW (ref 13–17)
HGB BLDA-MCNC: 8.9 G/DL — LOW (ref 13–17)
HGB BLDV-MCNC: 8.3 G/DL — LOW (ref 13–17)
HGB BLDV-MCNC: 8.6 G/DL — LOW (ref 13–17)
IMM GRANULOCYTES NFR BLD AUTO: 0.2 %
IMM GRANULOCYTES NFR BLD AUTO: 0.2 % — SIGNIFICANT CHANGE UP (ref 0–1.5)
IMM GRANULOCYTES NFR BLD AUTO: 0.3 % — SIGNIFICANT CHANGE UP (ref 0–1.5)
IMM GRANULOCYTES NFR BLD AUTO: 0.3 % — SIGNIFICANT CHANGE UP (ref 0–1.5)
IMM GRANULOCYTES NFR BLD AUTO: 0.5 % — SIGNIFICANT CHANGE UP (ref 0–1.5)
IMM GRANULOCYTES NFR BLD AUTO: 0.6 % — SIGNIFICANT CHANGE UP (ref 0–1.5)
INR BLD: 1.02 — SIGNIFICANT CHANGE UP (ref 0.88–1.17)
INR BLD: 1.04 — SIGNIFICANT CHANGE UP (ref 0.88–1.17)
INR BLD: 1.07 — SIGNIFICANT CHANGE UP (ref 0.88–1.17)
INR BLD: 1.1 RATIO — SIGNIFICANT CHANGE UP (ref 0.88–1.16)
INR BLD: 1.12 — SIGNIFICANT CHANGE UP (ref 0.88–1.17)
IRON SATN MFR SERPL: 11 % — LOW (ref 16–55)
IRON SATN MFR SERPL: 23 UG/DL — LOW (ref 45–165)
LACTATE BLDA-SCNC: 0.6 MMOL/L — SIGNIFICANT CHANGE UP (ref 0.5–2)
LYMPHOCYTES # BLD AUTO: 0.44 K/UL — LOW (ref 1–3.3)
LYMPHOCYTES # BLD AUTO: 0.45 K/UL — LOW (ref 1–3.3)
LYMPHOCYTES # BLD AUTO: 0.46 K/UL — LOW (ref 1–3.3)
LYMPHOCYTES # BLD AUTO: 0.52 K/UL — LOW (ref 1–3.3)
LYMPHOCYTES # BLD AUTO: 0.52 K/UL — LOW (ref 1–3.3)
LYMPHOCYTES # BLD AUTO: 0.55 K/UL — LOW (ref 1–3.3)
LYMPHOCYTES # BLD AUTO: 0.59 K/UL — LOW (ref 1–3.3)
LYMPHOCYTES # BLD AUTO: 0.66 K/UL
LYMPHOCYTES # BLD AUTO: 6.3 % — LOW (ref 13–44)
LYMPHOCYTES # BLD AUTO: 8.3 % — LOW (ref 13–44)
LYMPHOCYTES # BLD AUTO: 8.8 % — LOW (ref 13–44)
LYMPHOCYTES # BLD AUTO: 9.2 % — LOW (ref 13–44)
LYMPHOCYTES # BLD AUTO: 9.3 % — LOW (ref 13–44)
LYMPHOCYTES # BLD AUTO: 9.3 % — LOW (ref 13–44)
LYMPHOCYTES # BLD AUTO: 9.9 % — LOW (ref 13–44)
LYMPHOCYTES NFR BLD AUTO: 13.2 %
MAGNESIUM SERPL-MCNC: 1.8 MG/DL — SIGNIFICANT CHANGE UP (ref 1.6–2.6)
MAGNESIUM SERPL-MCNC: 1.9 MG/DL — SIGNIFICANT CHANGE UP (ref 1.6–2.6)
MAGNESIUM SERPL-MCNC: 2 MG/DL — SIGNIFICANT CHANGE UP (ref 1.6–2.6)
MAN DIFF?: NORMAL
MCHC RBC-ENTMCNC: 30.4 % — LOW (ref 32–36)
MCHC RBC-ENTMCNC: 30.4 GM/DL — LOW (ref 32–36)
MCHC RBC-ENTMCNC: 30.4 PG — SIGNIFICANT CHANGE UP (ref 27–34)
MCHC RBC-ENTMCNC: 30.5 % — LOW (ref 32–36)
MCHC RBC-ENTMCNC: 30.5 GM/DL — LOW (ref 32–36)
MCHC RBC-ENTMCNC: 30.5 GM/DL — LOW (ref 32–36)
MCHC RBC-ENTMCNC: 30.6 GM/DL — LOW (ref 32–36)
MCHC RBC-ENTMCNC: 30.8 GM/DL
MCHC RBC-ENTMCNC: 30.8 PG — SIGNIFICANT CHANGE UP (ref 27–34)
MCHC RBC-ENTMCNC: 30.9 PG — SIGNIFICANT CHANGE UP (ref 27–34)
MCHC RBC-ENTMCNC: 31 % — LOW (ref 32–36)
MCHC RBC-ENTMCNC: 31.1 % — LOW (ref 32–36)
MCHC RBC-ENTMCNC: 31.1 GM/DL — LOW (ref 32–36)
MCHC RBC-ENTMCNC: 31.3 % — LOW (ref 32–36)
MCHC RBC-ENTMCNC: 31.4 % — LOW (ref 32–36)
MCHC RBC-ENTMCNC: 31.5 % — LOW (ref 32–36)
MCHC RBC-ENTMCNC: 31.5 PG — SIGNIFICANT CHANGE UP (ref 27–34)
MCHC RBC-ENTMCNC: 31.7 PG — SIGNIFICANT CHANGE UP (ref 27–34)
MCHC RBC-ENTMCNC: 31.8 GM/DL — LOW (ref 32–36)
MCHC RBC-ENTMCNC: 31.8 PG — SIGNIFICANT CHANGE UP (ref 27–34)
MCHC RBC-ENTMCNC: 31.8 PG — SIGNIFICANT CHANGE UP (ref 27–34)
MCHC RBC-ENTMCNC: 31.9 PG — SIGNIFICANT CHANGE UP (ref 27–34)
MCHC RBC-ENTMCNC: 32 % — SIGNIFICANT CHANGE UP (ref 32–36)
MCHC RBC-ENTMCNC: 32 PG — SIGNIFICANT CHANGE UP (ref 27–34)
MCHC RBC-ENTMCNC: 32.1 PG
MCHC RBC-ENTMCNC: 32.1 PG — SIGNIFICANT CHANGE UP (ref 27–34)
MCHC RBC-ENTMCNC: 32.2 PG — SIGNIFICANT CHANGE UP (ref 27–34)
MCHC RBC-ENTMCNC: 32.3 PG — SIGNIFICANT CHANGE UP (ref 27–34)
MCHC RBC-ENTMCNC: 32.3 PG — SIGNIFICANT CHANGE UP (ref 27–34)
MCHC RBC-ENTMCNC: 32.4 PG — SIGNIFICANT CHANGE UP (ref 27–34)
MCHC RBC-ENTMCNC: 32.6 PG — SIGNIFICANT CHANGE UP (ref 27–34)
MCHC RBC-ENTMCNC: 33 PG — SIGNIFICANT CHANGE UP (ref 27–34)
MCV RBC AUTO: 101.9 FL — HIGH (ref 80–100)
MCV RBC AUTO: 102.3 FL — HIGH (ref 80–100)
MCV RBC AUTO: 102.6 FL — HIGH (ref 80–100)
MCV RBC AUTO: 102.6 FL — HIGH (ref 80–100)
MCV RBC AUTO: 103.4 FL — HIGH (ref 80–100)
MCV RBC AUTO: 103.4 FL — HIGH (ref 80–100)
MCV RBC AUTO: 103.8 FL — HIGH (ref 80–100)
MCV RBC AUTO: 103.9 FL — HIGH (ref 80–100)
MCV RBC AUTO: 104 FL
MCV RBC AUTO: 104.7 FL — HIGH (ref 80–100)
MCV RBC AUTO: 105.2 FL — HIGH (ref 80–100)
MCV RBC AUTO: 105.2 FL — HIGH (ref 80–100)
MCV RBC AUTO: 106 FL — HIGH (ref 80–100)
MCV RBC AUTO: 106.2 FL — HIGH (ref 80–100)
MCV RBC AUTO: 96.7 FL — SIGNIFICANT CHANGE UP (ref 80–100)
MCV RBC AUTO: 96.8 FL — SIGNIFICANT CHANGE UP (ref 80–100)
MCV RBC AUTO: 98.9 FL — SIGNIFICANT CHANGE UP (ref 80–100)
MONOCYTES # BLD AUTO: 0.31 K/UL — SIGNIFICANT CHANGE UP (ref 0–0.9)
MONOCYTES # BLD AUTO: 0.4 K/UL — SIGNIFICANT CHANGE UP (ref 0–0.9)
MONOCYTES # BLD AUTO: 0.49 K/UL
MONOCYTES # BLD AUTO: 0.51 K/UL — SIGNIFICANT CHANGE UP (ref 0–0.9)
MONOCYTES # BLD AUTO: 0.52 K/UL — SIGNIFICANT CHANGE UP (ref 0–0.9)
MONOCYTES # BLD AUTO: 0.54 K/UL — SIGNIFICANT CHANGE UP (ref 0–0.9)
MONOCYTES # BLD AUTO: 0.54 K/UL — SIGNIFICANT CHANGE UP (ref 0–0.9)
MONOCYTES # BLD AUTO: 0.56 K/UL — SIGNIFICANT CHANGE UP (ref 0–0.9)
MONOCYTES NFR BLD AUTO: 10.3 % — SIGNIFICANT CHANGE UP (ref 2–14)
MONOCYTES NFR BLD AUTO: 5.5 % — SIGNIFICANT CHANGE UP (ref 2–14)
MONOCYTES NFR BLD AUTO: 6.5 % — SIGNIFICANT CHANGE UP (ref 2–14)
MONOCYTES NFR BLD AUTO: 8.1 % — SIGNIFICANT CHANGE UP (ref 2–14)
MONOCYTES NFR BLD AUTO: 9 % — SIGNIFICANT CHANGE UP (ref 2–14)
MONOCYTES NFR BLD AUTO: 9.4 % — SIGNIFICANT CHANGE UP (ref 2–14)
MONOCYTES NFR BLD AUTO: 9.6 % — SIGNIFICANT CHANGE UP (ref 2–14)
MONOCYTES NFR BLD AUTO: 9.8 %
NEUTROPHILS # BLD AUTO: 3.44 K/UL — SIGNIFICANT CHANGE UP (ref 1.8–7.4)
NEUTROPHILS # BLD AUTO: 3.68 K/UL
NEUTROPHILS # BLD AUTO: 3.71 K/UL — SIGNIFICANT CHANGE UP (ref 1.8–7.4)
NEUTROPHILS # BLD AUTO: 4.34 K/UL — SIGNIFICANT CHANGE UP (ref 1.8–7.4)
NEUTROPHILS # BLD AUTO: 4.66 K/UL — SIGNIFICANT CHANGE UP (ref 1.8–7.4)
NEUTROPHILS # BLD AUTO: 4.74 K/UL — SIGNIFICANT CHANGE UP (ref 1.8–7.4)
NEUTROPHILS # BLD AUTO: 5.46 K/UL — SIGNIFICANT CHANGE UP (ref 1.8–7.4)
NEUTROPHILS # BLD AUTO: 7.09 K/UL — SIGNIFICANT CHANGE UP (ref 1.8–7.4)
NEUTROPHILS NFR BLD AUTO: 73.4 %
NEUTROPHILS NFR BLD AUTO: 75.3 % — SIGNIFICANT CHANGE UP (ref 43–77)
NEUTROPHILS NFR BLD AUTO: 77.4 % — HIGH (ref 43–77)
NEUTROPHILS NFR BLD AUTO: 79.3 % — HIGH (ref 43–77)
NEUTROPHILS NFR BLD AUTO: 80 % — HIGH (ref 43–77)
NEUTROPHILS NFR BLD AUTO: 81.8 % — HIGH (ref 43–77)
NEUTROPHILS NFR BLD AUTO: 83.1 % — HIGH (ref 43–77)
NEUTROPHILS NFR BLD AUTO: 85.5 % — HIGH (ref 43–77)
NRBC # BLD: 0 /100 WBCS — SIGNIFICANT CHANGE UP (ref 0–0)
NRBC # FLD: 0 K/UL — LOW (ref 25–125)
NRBC # FLD: 0 K/UL — SIGNIFICANT CHANGE UP (ref 0–0)
PA ADP PRP-ACNC: 105 PRU — LOW (ref 194–418)
PA ADP PRP-ACNC: 143 PRU — LOW (ref 194–418)
PA ADP PRP-ACNC: 169 PRU — LOW (ref 194–418)
PCO2 BLDA: 65 MMHG — HIGH (ref 35–48)
PCO2 BLDA: 79 MMHG — CRITICAL HIGH (ref 35–48)
PCO2 BLDA: 81 MMHG — CRITICAL HIGH (ref 35–48)
PCO2 BLDV: 73 MMHG — HIGH (ref 41–51)
PCO2 BLDV: 82 MMHG — CRITICAL HIGH (ref 41–51)
PH BLDA: 7.25 PH — LOW (ref 7.35–7.45)
PH BLDA: 7.26 PH — LOW (ref 7.35–7.45)
PH BLDA: 7.36 PH — SIGNIFICANT CHANGE UP (ref 7.35–7.45)
PH BLDV: 7.26 PH — LOW (ref 7.32–7.43)
PH BLDV: 7.3 PH — LOW (ref 7.32–7.43)
PHENYTOIN FREE SERPL-MCNC: 1 MG/L — SIGNIFICANT CHANGE UP (ref 1–2)
PHENYTOIN SERPL QL: 7.5 UG/ML
PHOSPHATE SERPL-MCNC: 2.3 MG/DL — LOW (ref 2.5–4.5)
PHOSPHATE SERPL-MCNC: 2.5 MG/DL — SIGNIFICANT CHANGE UP (ref 2.5–4.5)
PHOSPHATE SERPL-MCNC: 2.5 MG/DL — SIGNIFICANT CHANGE UP (ref 2.5–4.5)
PHOSPHATE SERPL-MCNC: 2.7 MG/DL — SIGNIFICANT CHANGE UP (ref 2.5–4.5)
PLATELET # BLD AUTO: 103 K/UL — LOW (ref 150–400)
PLATELET # BLD AUTO: 107 K/UL — LOW (ref 150–400)
PLATELET # BLD AUTO: 118 K/UL — LOW (ref 150–400)
PLATELET # BLD AUTO: 119 K/UL — LOW (ref 150–400)
PLATELET # BLD AUTO: 123 K/UL — LOW (ref 150–400)
PLATELET # BLD AUTO: 126 K/UL — LOW (ref 150–400)
PLATELET # BLD AUTO: 130 K/UL — LOW (ref 150–400)
PLATELET # BLD AUTO: 143 K/UL — LOW (ref 150–400)
PLATELET # BLD AUTO: 152 K/UL — SIGNIFICANT CHANGE UP (ref 150–400)
PLATELET # BLD AUTO: 154 K/UL — SIGNIFICANT CHANGE UP (ref 150–400)
PLATELET # BLD AUTO: 162 K/UL — SIGNIFICANT CHANGE UP (ref 150–400)
PLATELET # BLD AUTO: 168 K/UL
PLATELET # BLD AUTO: 168 K/UL — SIGNIFICANT CHANGE UP (ref 150–400)
PLATELET # BLD AUTO: 188 K/UL — SIGNIFICANT CHANGE UP (ref 150–400)
PLATELET # BLD AUTO: 190 K/UL — SIGNIFICANT CHANGE UP (ref 150–400)
PMV BLD: 10 FL — SIGNIFICANT CHANGE UP (ref 7–13)
PMV BLD: 9.1 FL — SIGNIFICANT CHANGE UP (ref 7–13)
PMV BLD: 9.4 FL — SIGNIFICANT CHANGE UP (ref 7–13)
PMV BLD: 9.4 FL — SIGNIFICANT CHANGE UP (ref 7–13)
PMV BLD: 9.5 FL — SIGNIFICANT CHANGE UP (ref 7–13)
PMV BLD: 9.8 FL — SIGNIFICANT CHANGE UP (ref 7–13)
PMV BLD: 9.9 FL — SIGNIFICANT CHANGE UP (ref 7–13)
PO2 BLDA: 109 MMHG — HIGH (ref 83–108)
PO2 BLDA: 169 MMHG — HIGH (ref 83–108)
PO2 BLDA: 179 MMHG — HIGH (ref 83–108)
PO2 BLDV: 25 MMHG — LOW (ref 35–40)
PO2 BLDV: 36 MMHG — SIGNIFICANT CHANGE UP (ref 35–40)
POTASSIUM BLDA-SCNC: 4.2 MMOL/L — SIGNIFICANT CHANGE UP (ref 3.4–4.5)
POTASSIUM BLDV-SCNC: 3.9 MMOL/L — SIGNIFICANT CHANGE UP (ref 3.4–4.5)
POTASSIUM BLDV-SCNC: 4 MMOL/L — SIGNIFICANT CHANGE UP (ref 3.4–4.5)
POTASSIUM SERPL-MCNC: 3.7 MMOL/L — SIGNIFICANT CHANGE UP (ref 3.5–5.3)
POTASSIUM SERPL-MCNC: 4 MMOL/L — SIGNIFICANT CHANGE UP (ref 3.5–5.3)
POTASSIUM SERPL-MCNC: 4.1 MMOL/L — SIGNIFICANT CHANGE UP (ref 3.5–5.3)
POTASSIUM SERPL-MCNC: 4.1 MMOL/L — SIGNIFICANT CHANGE UP (ref 3.5–5.3)
POTASSIUM SERPL-MCNC: 4.2 MMOL/L — SIGNIFICANT CHANGE UP (ref 3.5–5.3)
POTASSIUM SERPL-MCNC: 4.3 MMOL/L — SIGNIFICANT CHANGE UP (ref 3.5–5.3)
POTASSIUM SERPL-MCNC: 4.4 MMOL/L — SIGNIFICANT CHANGE UP (ref 3.5–5.3)
POTASSIUM SERPL-MCNC: 4.4 MMOL/L — SIGNIFICANT CHANGE UP (ref 3.5–5.3)
POTASSIUM SERPL-MCNC: 4.6 MMOL/L — SIGNIFICANT CHANGE UP (ref 3.5–5.3)
POTASSIUM SERPL-MCNC: 4.6 MMOL/L — SIGNIFICANT CHANGE UP (ref 3.5–5.3)
POTASSIUM SERPL-MCNC: 4.7 MMOL/L — SIGNIFICANT CHANGE UP (ref 3.5–5.3)
POTASSIUM SERPL-MCNC: 4.9 MMOL/L — SIGNIFICANT CHANGE UP (ref 3.5–5.3)
POTASSIUM SERPL-MCNC: 5 MMOL/L — SIGNIFICANT CHANGE UP (ref 3.5–5.3)
POTASSIUM SERPL-MCNC: 6.1 MMOL/L — HIGH (ref 3.5–5.3)
POTASSIUM SERPL-SCNC: 3.7 MMOL/L — SIGNIFICANT CHANGE UP (ref 3.5–5.3)
POTASSIUM SERPL-SCNC: 4 MMOL/L
POTASSIUM SERPL-SCNC: 4 MMOL/L — SIGNIFICANT CHANGE UP (ref 3.5–5.3)
POTASSIUM SERPL-SCNC: 4.1 MMOL/L — SIGNIFICANT CHANGE UP (ref 3.5–5.3)
POTASSIUM SERPL-SCNC: 4.1 MMOL/L — SIGNIFICANT CHANGE UP (ref 3.5–5.3)
POTASSIUM SERPL-SCNC: 4.2 MMOL/L — SIGNIFICANT CHANGE UP (ref 3.5–5.3)
POTASSIUM SERPL-SCNC: 4.3 MMOL/L — SIGNIFICANT CHANGE UP (ref 3.5–5.3)
POTASSIUM SERPL-SCNC: 4.4 MMOL/L — SIGNIFICANT CHANGE UP (ref 3.5–5.3)
POTASSIUM SERPL-SCNC: 4.4 MMOL/L — SIGNIFICANT CHANGE UP (ref 3.5–5.3)
POTASSIUM SERPL-SCNC: 4.6 MMOL/L — SIGNIFICANT CHANGE UP (ref 3.5–5.3)
POTASSIUM SERPL-SCNC: 4.6 MMOL/L — SIGNIFICANT CHANGE UP (ref 3.5–5.3)
POTASSIUM SERPL-SCNC: 4.7 MMOL/L — SIGNIFICANT CHANGE UP (ref 3.5–5.3)
POTASSIUM SERPL-SCNC: 4.9 MMOL/L — SIGNIFICANT CHANGE UP (ref 3.5–5.3)
POTASSIUM SERPL-SCNC: 5 MMOL/L — SIGNIFICANT CHANGE UP (ref 3.5–5.3)
POTASSIUM SERPL-SCNC: 6.1 MMOL/L — HIGH (ref 3.5–5.3)
PREALB SERPL-MCNC: 15 MG/DL — LOW (ref 20–40)
PROT SERPL-MCNC: 6.1 G/DL
PROT SERPL-MCNC: 6.1 G/DL — SIGNIFICANT CHANGE UP (ref 6–8.3)
PROT SERPL-MCNC: 6.7 G/DL — SIGNIFICANT CHANGE UP (ref 6–8.3)
PROT SERPL-MCNC: 6.8 G/DL — SIGNIFICANT CHANGE UP (ref 6–8.3)
PROT SERPL-MCNC: 6.9 G/DL — SIGNIFICANT CHANGE UP (ref 6–8.3)
PROT SERPL-MCNC: 6.9 G/DL — SIGNIFICANT CHANGE UP (ref 6–8.3)
PROTHROM AB SERPL-ACNC: 11.3 SEC — SIGNIFICANT CHANGE UP (ref 9.8–13.1)
PROTHROM AB SERPL-ACNC: 11.9 SEC — SIGNIFICANT CHANGE UP (ref 9.8–13.1)
PROTHROM AB SERPL-ACNC: 12.2 SEC — SIGNIFICANT CHANGE UP (ref 9.8–13.1)
PROTHROM AB SERPL-ACNC: 12.3 SEC — SIGNIFICANT CHANGE UP (ref 10–12.9)
PROTHROM AB SERPL-ACNC: 12.5 SEC — SIGNIFICANT CHANGE UP (ref 9.8–13.1)
RBC # BLD: 2.32 M/UL — LOW (ref 4.2–5.8)
RBC # BLD: 2.34 M/UL — LOW (ref 4.2–5.8)
RBC # BLD: 2.35 M/UL — LOW (ref 4.2–5.8)
RBC # BLD: 2.5 M/UL — LOW (ref 4.2–5.8)
RBC # BLD: 2.51 M/UL — LOW (ref 4.2–5.8)
RBC # BLD: 2.59 M/UL — LOW (ref 4.2–5.8)
RBC # BLD: 2.6 M/UL — LOW (ref 4.2–5.8)
RBC # BLD: 2.64 M/UL — LOW (ref 4.2–5.8)
RBC # BLD: 2.67 M/UL — LOW (ref 4.2–5.8)
RBC # BLD: 2.7 M/UL — LOW (ref 4.2–5.8)
RBC # BLD: 2.85 M/UL — LOW (ref 4.2–5.8)
RBC # BLD: 3.02 M/UL — LOW (ref 4.2–5.8)
RBC # BLD: 3.09 M/UL — LOW (ref 4.2–5.8)
RBC # BLD: 3.21 M/UL
RBC # BLD: 3.36 M/UL — LOW (ref 4.2–5.8)
RBC # BLD: 3.67 M/UL — LOW (ref 4.2–5.8)
RBC # BLD: 3.75 M/UL — LOW (ref 4.2–5.8)
RBC # FLD: 14.4 % — SIGNIFICANT CHANGE UP (ref 10.3–14.5)
RBC # FLD: 14.4 % — SIGNIFICANT CHANGE UP (ref 10.3–14.5)
RBC # FLD: 14.5 % — SIGNIFICANT CHANGE UP (ref 10.3–14.5)
RBC # FLD: 14.6 % — HIGH (ref 10.3–14.5)
RBC # FLD: 14.8 % — HIGH (ref 10.3–14.5)
RBC # FLD: 14.8 % — HIGH (ref 10.3–14.5)
RBC # FLD: 15.1 %
RBC # FLD: 16.2 % — HIGH (ref 10.3–14.5)
RH IG SCN BLD-IMP: POSITIVE — SIGNIFICANT CHANGE UP
SAO2 % BLDA: 97.5 % — SIGNIFICANT CHANGE UP (ref 95–99)
SAO2 % BLDA: 99 % — SIGNIFICANT CHANGE UP (ref 95–99)
SAO2 % BLDA: 99.1 % — HIGH (ref 95–99)
SAO2 % BLDV: 36.7 % — LOW (ref 60–85)
SAO2 % BLDV: 60.1 % — SIGNIFICANT CHANGE UP (ref 60–85)
SODIUM BLDA-SCNC: 138 MMOL/L — SIGNIFICANT CHANGE UP (ref 136–146)
SODIUM SERPL-SCNC: 135 MMOL/L — SIGNIFICANT CHANGE UP (ref 135–145)
SODIUM SERPL-SCNC: 136 MMOL/L — SIGNIFICANT CHANGE UP (ref 135–145)
SODIUM SERPL-SCNC: 138 MMOL/L — SIGNIFICANT CHANGE UP (ref 135–145)
SODIUM SERPL-SCNC: 138 MMOL/L — SIGNIFICANT CHANGE UP (ref 135–145)
SODIUM SERPL-SCNC: 139 MMOL/L — SIGNIFICANT CHANGE UP (ref 135–145)
SODIUM SERPL-SCNC: 140 MMOL/L — SIGNIFICANT CHANGE UP (ref 135–145)
SODIUM SERPL-SCNC: 141 MMOL/L — SIGNIFICANT CHANGE UP (ref 135–145)
SODIUM SERPL-SCNC: 142 MMOL/L — SIGNIFICANT CHANGE UP (ref 135–145)
SODIUM SERPL-SCNC: 144 MMOL/L
SODIUM SERPL-SCNC: 144 MMOL/L — SIGNIFICANT CHANGE UP (ref 135–145)
TIBC SERPL-MCNC: 201 UG/DL — LOW (ref 220–430)
TRANSFERRIN SERPL-MCNC: 156 MG/DL — LOW (ref 200–360)
TROPONIN I SERPL-MCNC: 0.7 NG/ML — HIGH (ref 0.02–0.06)
TROPONIN I SERPL-MCNC: 0.86 NG/ML — HIGH (ref 0.02–0.06)
TROPONIN I SERPL-MCNC: 0.94 NG/ML — HIGH (ref 0.02–0.06)
TROPONIN T, HIGH SENSITIVITY: 249 NG/L — CRITICAL HIGH (ref ?–14)
TROPONIN T, HIGH SENSITIVITY: 306 NG/L — CRITICAL HIGH (ref ?–14)
TROPONIN T, HIGH SENSITIVITY: 314 NG/L — CRITICAL HIGH (ref ?–14)
TSH RECEP AB FLD-ACNC: <0.5 IU/L — SIGNIFICANT CHANGE UP (ref 0–1.75)
TSH SERPL-ACNC: 1.83 UIU/ML
UIBC SERPL-MCNC: 178 UG/DL — SIGNIFICANT CHANGE UP (ref 110–370)
VIT B12 SERPL-MCNC: 1547 PG/ML — HIGH (ref 232–1245)
WBC # BLD: 2.59 K/UL — LOW (ref 3.8–10.5)
WBC # BLD: 2.8 K/UL — LOW (ref 3.8–10.5)
WBC # BLD: 3.62 K/UL — LOW (ref 3.8–10.5)
WBC # BLD: 3.72 K/UL — LOW (ref 3.8–10.5)
WBC # BLD: 3.94 K/UL — SIGNIFICANT CHANGE UP (ref 3.8–10.5)
WBC # BLD: 4.01 K/UL — SIGNIFICANT CHANGE UP (ref 3.8–10.5)
WBC # BLD: 4.29 K/UL — SIGNIFICANT CHANGE UP (ref 3.8–10.5)
WBC # BLD: 4.44 K/UL — SIGNIFICANT CHANGE UP (ref 3.8–10.5)
WBC # BLD: 4.72 K/UL — SIGNIFICANT CHANGE UP (ref 3.8–10.5)
WBC # BLD: 4.92 K/UL — SIGNIFICANT CHANGE UP (ref 3.8–10.5)
WBC # BLD: 4.93 K/UL — SIGNIFICANT CHANGE UP (ref 3.8–10.5)
WBC # BLD: 5.42 K/UL — SIGNIFICANT CHANGE UP (ref 3.8–10.5)
WBC # BLD: 5.61 K/UL — SIGNIFICANT CHANGE UP (ref 3.8–10.5)
WBC # BLD: 5.98 K/UL — SIGNIFICANT CHANGE UP (ref 3.8–10.5)
WBC # BLD: 6.68 K/UL — SIGNIFICANT CHANGE UP (ref 3.8–10.5)
WBC # BLD: 8.29 K/UL — SIGNIFICANT CHANGE UP (ref 3.8–10.5)
WBC # FLD AUTO: 2.59 K/UL — LOW (ref 3.8–10.5)
WBC # FLD AUTO: 2.8 K/UL — LOW (ref 3.8–10.5)
WBC # FLD AUTO: 3.62 K/UL — LOW (ref 3.8–10.5)
WBC # FLD AUTO: 3.72 K/UL — LOW (ref 3.8–10.5)
WBC # FLD AUTO: 3.94 K/UL — SIGNIFICANT CHANGE UP (ref 3.8–10.5)
WBC # FLD AUTO: 4.01 K/UL — SIGNIFICANT CHANGE UP (ref 3.8–10.5)
WBC # FLD AUTO: 4.29 K/UL — SIGNIFICANT CHANGE UP (ref 3.8–10.5)
WBC # FLD AUTO: 4.44 K/UL — SIGNIFICANT CHANGE UP (ref 3.8–10.5)
WBC # FLD AUTO: 4.72 K/UL — SIGNIFICANT CHANGE UP (ref 3.8–10.5)
WBC # FLD AUTO: 4.92 K/UL — SIGNIFICANT CHANGE UP (ref 3.8–10.5)
WBC # FLD AUTO: 4.93 K/UL — SIGNIFICANT CHANGE UP (ref 3.8–10.5)
WBC # FLD AUTO: 5.01 K/UL
WBC # FLD AUTO: 5.42 K/UL — SIGNIFICANT CHANGE UP (ref 3.8–10.5)
WBC # FLD AUTO: 5.61 K/UL — SIGNIFICANT CHANGE UP (ref 3.8–10.5)
WBC # FLD AUTO: 5.98 K/UL — SIGNIFICANT CHANGE UP (ref 3.8–10.5)
WBC # FLD AUTO: 6.68 K/UL — SIGNIFICANT CHANGE UP (ref 3.8–10.5)
WBC # FLD AUTO: 8.29 K/UL — SIGNIFICANT CHANGE UP (ref 3.8–10.5)

## 2019-01-01 PROCEDURE — 99292 CRITICAL CARE ADDL 30 MIN: CPT

## 2019-01-01 PROCEDURE — 99213 OFFICE O/P EST LOW 20 MIN: CPT

## 2019-01-01 PROCEDURE — 99233 SBSQ HOSP IP/OBS HIGH 50: CPT

## 2019-01-01 PROCEDURE — V5014C: CUSTOM | Mod: LT

## 2019-01-01 PROCEDURE — 71045 X-RAY EXAM CHEST 1 VIEW: CPT | Mod: 26

## 2019-01-01 PROCEDURE — 99214 OFFICE O/P EST MOD 30 MIN: CPT | Mod: PD

## 2019-01-01 PROCEDURE — 36415 COLL VENOUS BLD VENIPUNCTURE: CPT

## 2019-01-01 PROCEDURE — V5014C: CUSTOM

## 2019-01-01 PROCEDURE — 43450 DILATE ESOPHAGUS 1/MULT PASS: CPT

## 2019-01-01 PROCEDURE — 93010 ELECTROCARDIOGRAM REPORT: CPT

## 2019-01-01 PROCEDURE — 73502 X-RAY EXAM HIP UNI 2-3 VIEWS: CPT

## 2019-01-01 PROCEDURE — 76376 3D RENDER W/INTRP POSTPROCES: CPT | Mod: 26

## 2019-01-01 PROCEDURE — 99291 CRITICAL CARE FIRST HOUR: CPT

## 2019-01-01 PROCEDURE — 82607 VITAMIN B-12: CPT

## 2019-01-01 PROCEDURE — 99239 HOSP IP/OBS DSCHRG MGMT >30: CPT

## 2019-01-01 PROCEDURE — 82728 ASSAY OF FERRITIN: CPT

## 2019-01-01 PROCEDURE — 73502 X-RAY EXAM HIP UNI 2-3 VIEWS: CPT | Mod: 26,RT

## 2019-01-01 PROCEDURE — 83550 IRON BINDING TEST: CPT

## 2019-01-01 PROCEDURE — 93306 TTE W/DOPPLER COMPLETE: CPT

## 2019-01-01 PROCEDURE — 99223 1ST HOSP IP/OBS HIGH 75: CPT

## 2019-01-01 PROCEDURE — 80053 COMPREHEN METABOLIC PANEL: CPT

## 2019-01-01 PROCEDURE — 71045 X-RAY EXAM CHEST 1 VIEW: CPT

## 2019-01-01 PROCEDURE — 71250 CT THORAX DX C-: CPT | Mod: 26

## 2019-01-01 PROCEDURE — 93005 ELECTROCARDIOGRAM TRACING: CPT

## 2019-01-01 PROCEDURE — 93306 TTE W/DOPPLER COMPLETE: CPT | Mod: 26

## 2019-01-01 PROCEDURE — 99152 MOD SED SAME PHYS/QHP 5/>YRS: CPT

## 2019-01-01 PROCEDURE — 84484 ASSAY OF TROPONIN QUANT: CPT

## 2019-01-01 PROCEDURE — 86900 BLOOD TYPING SEROLOGIC ABO: CPT

## 2019-01-01 PROCEDURE — 71250 CT THORAX DX C-: CPT

## 2019-01-01 PROCEDURE — 99214 OFFICE O/P EST MOD 30 MIN: CPT

## 2019-01-01 PROCEDURE — 80186 ASSAY OF PHENYTOIN FREE: CPT

## 2019-01-01 PROCEDURE — 99232 SBSQ HOSP IP/OBS MODERATE 35: CPT

## 2019-01-01 PROCEDURE — 99285 EMERGENCY DEPT VISIT HI MDM: CPT | Mod: 25

## 2019-01-01 PROCEDURE — 72192 CT PELVIS W/O DYE: CPT | Mod: 26

## 2019-01-01 PROCEDURE — 72192 CT PELVIS W/O DYE: CPT

## 2019-01-01 PROCEDURE — 86901 BLOOD TYPING SEROLOGIC RH(D): CPT

## 2019-01-01 PROCEDURE — 12345: CPT | Mod: NC

## 2019-01-01 PROCEDURE — 82746 ASSAY OF FOLIC ACID SERUM: CPT

## 2019-01-01 PROCEDURE — 93460 R&L HRT ART/VENTRICLE ANGIO: CPT

## 2019-01-01 PROCEDURE — 43249 ESOPH EGD DILATION <30 MM: CPT

## 2019-01-01 PROCEDURE — 85610 PROTHROMBIN TIME: CPT

## 2019-01-01 PROCEDURE — 94640 AIRWAY INHALATION TREATMENT: CPT

## 2019-01-01 PROCEDURE — 85027 COMPLETE CBC AUTOMATED: CPT

## 2019-01-01 PROCEDURE — C1769: CPT

## 2019-01-01 PROCEDURE — C1894: CPT

## 2019-01-01 PROCEDURE — 99223 1ST HOSP IP/OBS HIGH 75: CPT | Mod: 57

## 2019-01-01 PROCEDURE — 73706 CT ANGIO LWR EXTR W/O&W/DYE: CPT | Mod: 26,RT,52

## 2019-01-01 PROCEDURE — 93460 R&L HRT ART/VENTRICLE ANGIO: CPT | Mod: 26

## 2019-01-01 PROCEDURE — 94660 CPAP INITIATION&MGMT: CPT

## 2019-01-01 PROCEDURE — 74220 X-RAY XM ESOPHAGUS 1CNTRST: CPT | Mod: 26

## 2019-01-01 PROCEDURE — 85730 THROMBOPLASTIN TIME PARTIAL: CPT

## 2019-01-01 PROCEDURE — 76376 3D RENDER W/INTRP POSTPROCES: CPT

## 2019-01-01 PROCEDURE — 93926 LOWER EXTREMITY STUDY: CPT

## 2019-01-01 PROCEDURE — 84466 ASSAY OF TRANSFERRIN: CPT

## 2019-01-01 PROCEDURE — 93926 LOWER EXTREMITY STUDY: CPT | Mod: 26,RT

## 2019-01-01 PROCEDURE — 96374 THER/PROPH/DIAG INJ IV PUSH: CPT

## 2019-01-01 PROCEDURE — 99238 HOSP IP/OBS DSCHRG MGMT 30/<: CPT

## 2019-01-01 PROCEDURE — 76942 ECHO GUIDE FOR BIOPSY: CPT | Mod: 26

## 2019-01-01 PROCEDURE — C1887: CPT

## 2019-01-01 PROCEDURE — 80048 BASIC METABOLIC PNL TOTAL CA: CPT

## 2019-01-01 PROCEDURE — 99223 1ST HOSP IP/OBS HIGH 75: CPT | Mod: GC

## 2019-01-01 PROCEDURE — 43248 EGD GUIDE WIRE INSERTION: CPT

## 2019-01-01 PROCEDURE — 99285 EMERGENCY DEPT VISIT HI MDM: CPT

## 2019-01-01 PROCEDURE — 97162 PT EVAL MOD COMPLEX 30 MIN: CPT

## 2019-01-01 PROCEDURE — 83540 ASSAY OF IRON: CPT

## 2019-01-01 PROCEDURE — 93926 LOWER EXTREMITY STUDY: CPT | Mod: 26,LT

## 2019-01-01 PROCEDURE — 86850 RBC ANTIBODY SCREEN: CPT

## 2019-01-01 PROCEDURE — 83036 HEMOGLOBIN GLYCOSYLATED A1C: CPT

## 2019-01-01 PROCEDURE — 99222 1ST HOSP IP/OBS MODERATE 55: CPT

## 2019-01-01 RX ORDER — LEVOTHYROXINE SODIUM 125 MCG
50 TABLET ORAL DAILY
Refills: 0 | Status: DISCONTINUED | OUTPATIENT
Start: 2019-01-01 | End: 2019-01-01

## 2019-01-01 RX ORDER — PREGABALIN 225 MG/1
1000 CAPSULE ORAL DAILY
Refills: 0 | Status: DISCONTINUED | OUTPATIENT
Start: 2019-01-01 | End: 2019-01-01

## 2019-01-01 RX ORDER — FUROSEMIDE 40 MG
1 TABLET ORAL
Qty: 0 | Refills: 0 | DISCHARGE

## 2019-01-01 RX ORDER — SENNA PLUS 8.6 MG/1
1 TABLET ORAL AT BEDTIME
Refills: 0 | Status: DISCONTINUED | OUTPATIENT
Start: 2019-01-01 | End: 2019-01-01

## 2019-01-01 RX ORDER — ACETAMINOPHEN 500 MG
750 TABLET ORAL ONCE
Refills: 0 | Status: COMPLETED | OUTPATIENT
Start: 2019-01-01 | End: 2019-01-01

## 2019-01-01 RX ORDER — ALBUTEROL 90 UG/1
2 AEROSOL, METERED ORAL
Qty: 0 | Refills: 0 | DISCHARGE

## 2019-01-01 RX ORDER — TAMSULOSIN HYDROCHLORIDE 0.4 MG/1
0.4 CAPSULE ORAL AT BEDTIME
Refills: 0 | Status: DISCONTINUED | OUTPATIENT
Start: 2019-01-01 | End: 2019-01-01

## 2019-01-01 RX ORDER — OXYCODONE AND ACETAMINOPHEN 5; 325 MG/1; MG/1
1 TABLET ORAL EVERY 4 HOURS
Refills: 0 | Status: DISCONTINUED | OUTPATIENT
Start: 2019-01-01 | End: 2019-01-01

## 2019-01-01 RX ORDER — ACETAMINOPHEN 500 MG
1000 TABLET ORAL ONCE
Refills: 0 | Status: DISCONTINUED | OUTPATIENT
Start: 2019-01-01 | End: 2019-01-01

## 2019-01-01 RX ORDER — ALPRAZOLAM 0.25 MG
0.25 TABLET ORAL
Refills: 0 | Status: DISCONTINUED | OUTPATIENT
Start: 2019-01-01 | End: 2019-01-01

## 2019-01-01 RX ORDER — HEPARIN SODIUM 5000 [USP'U]/ML
5000 INJECTION INTRAVENOUS; SUBCUTANEOUS EVERY 8 HOURS
Qty: 0 | Refills: 0 | Status: DISCONTINUED | OUTPATIENT
Start: 2019-01-01 | End: 2019-01-01

## 2019-01-01 RX ORDER — CLOPIDOGREL BISULFATE 75 MG/1
75 TABLET, FILM COATED ORAL DAILY
Refills: 0 | Status: DISCONTINUED | OUTPATIENT
Start: 2019-01-01 | End: 2019-01-01

## 2019-01-01 RX ORDER — ESCITALOPRAM OXALATE 10 MG/1
5 TABLET, FILM COATED ORAL DAILY
Refills: 0 | Status: DISCONTINUED | OUTPATIENT
Start: 2019-01-01 | End: 2019-01-01

## 2019-01-01 RX ORDER — IPRATROPIUM/ALBUTEROL SULFATE 18-103MCG
3 AEROSOL WITH ADAPTER (GRAM) INHALATION EVERY 6 HOURS
Refills: 0 | Status: DISCONTINUED | OUTPATIENT
Start: 2019-01-01 | End: 2019-01-01

## 2019-01-01 RX ORDER — FERROUS SULFATE 325(65) MG
325 TABLET ORAL DAILY
Refills: 0 | Status: DISCONTINUED | OUTPATIENT
Start: 2019-01-01 | End: 2019-01-01

## 2019-01-01 RX ORDER — SIMVASTATIN 20 MG/1
40 TABLET, FILM COATED ORAL AT BEDTIME
Refills: 0 | Status: DISCONTINUED | OUTPATIENT
Start: 2019-01-01 | End: 2019-01-01

## 2019-01-01 RX ORDER — ONDANSETRON 8 MG/1
4 TABLET, FILM COATED ORAL ONCE
Refills: 0 | Status: DISCONTINUED | OUTPATIENT
Start: 2019-01-01 | End: 2019-01-01

## 2019-01-01 RX ORDER — SODIUM CHLORIDE 9 MG/ML
1000 INJECTION, SOLUTION INTRAVENOUS
Refills: 0 | Status: DISCONTINUED | OUTPATIENT
Start: 2019-01-01 | End: 2019-01-01

## 2019-01-01 RX ORDER — THROMBIN (BOVINE) 10000 UNIT
5000 KIT TOPICAL ONCE
Refills: 0 | Status: DISCONTINUED | OUTPATIENT
Start: 2019-01-01 | End: 2019-01-01

## 2019-01-01 RX ORDER — DEXTROSE 50 % IN WATER 50 %
50 SYRINGE (ML) INTRAVENOUS ONCE
Refills: 0 | Status: COMPLETED | OUTPATIENT
Start: 2019-01-01 | End: 2019-01-01

## 2019-01-01 RX ORDER — LANOLIN ALCOHOL/MO/W.PET/CERES
3 CREAM (GRAM) TOPICAL AT BEDTIME
Refills: 0 | Status: DISCONTINUED | OUTPATIENT
Start: 2019-01-01 | End: 2019-01-01

## 2019-01-01 RX ORDER — SODIUM CHLORIDE 0.65 %
1 AEROSOL, SPRAY (ML) NASAL
Qty: 0 | Refills: 0 | Status: DISCONTINUED | OUTPATIENT
Start: 2019-01-01 | End: 2019-01-01

## 2019-01-01 RX ORDER — SODIUM CHLORIDE 0.65 %
1 AEROSOL, SPRAY (ML) NASAL THREE TIMES A DAY
Refills: 0 | Status: DISCONTINUED | OUTPATIENT
Start: 2019-01-01 | End: 2019-01-01

## 2019-01-01 RX ORDER — PREDNISONE 5 MG/1
5 TABLET ORAL
Qty: 120 | Refills: 3 | Status: ACTIVE | COMMUNITY
Start: 2018-12-03 | End: 1900-01-01

## 2019-01-01 RX ORDER — CHLORHEXIDINE GLUCONATE 213 G/1000ML
1 SOLUTION TOPICAL ONCE
Qty: 0 | Refills: 0 | Status: COMPLETED | OUTPATIENT
Start: 2019-01-01 | End: 2019-01-01

## 2019-01-01 RX ORDER — FINASTERIDE 5 MG/1
5 TABLET, FILM COATED ORAL DAILY
Refills: 0 | Status: DISCONTINUED | OUTPATIENT
Start: 2019-01-01 | End: 2019-01-01

## 2019-01-01 RX ORDER — SODIUM CHLORIDE 0.65 %
1 AEROSOL, SPRAY (ML) NASAL
Refills: 0 | Status: DISCONTINUED | OUTPATIENT
Start: 2019-01-01 | End: 2019-01-01

## 2019-01-01 RX ORDER — PANTOPRAZOLE SODIUM 20 MG/1
40 TABLET, DELAYED RELEASE ORAL
Refills: 0 | Status: DISCONTINUED | OUTPATIENT
Start: 2019-01-01 | End: 2019-01-01

## 2019-01-01 RX ORDER — ACETAMINOPHEN 500 MG
650 TABLET ORAL ONCE
Refills: 0 | Status: COMPLETED | OUTPATIENT
Start: 2019-01-01 | End: 2020-11-26

## 2019-01-01 RX ORDER — SODIUM CHLORIDE 9 MG/ML
3 INJECTION INTRAMUSCULAR; INTRAVENOUS; SUBCUTANEOUS EVERY 8 HOURS
Qty: 0 | Refills: 0 | Status: DISCONTINUED | OUTPATIENT
Start: 2019-01-01 | End: 2019-01-01

## 2019-01-01 RX ORDER — ACETAMINOPHEN 500 MG
975 TABLET ORAL ONCE
Refills: 0 | Status: COMPLETED | OUTPATIENT
Start: 2019-01-01 | End: 2019-01-01

## 2019-01-01 RX ORDER — FENTANYL CITRATE 50 UG/ML
25 INJECTION INTRAVENOUS
Refills: 0 | Status: DISCONTINUED | OUTPATIENT
Start: 2019-01-01 | End: 2019-01-01

## 2019-01-01 RX ORDER — SODIUM CHLORIDE 9 MG/ML
1000 INJECTION, SOLUTION INTRAVENOUS
Qty: 0 | Refills: 0 | Status: DISCONTINUED | OUTPATIENT
Start: 2019-01-01 | End: 2019-01-01

## 2019-01-01 RX ORDER — ACETAMINOPHEN 500 MG
750 TABLET ORAL ONCE
Refills: 0 | Status: DISCONTINUED | OUTPATIENT
Start: 2019-01-01 | End: 2019-01-01

## 2019-01-01 RX ORDER — ALBUMIN HUMAN 25 %
250 VIAL (ML) INTRAVENOUS ONCE
Refills: 0 | Status: COMPLETED | OUTPATIENT
Start: 2019-01-01 | End: 2019-01-01

## 2019-01-01 RX ORDER — LEVOTHYROXINE SODIUM 125 MCG
40 TABLET ORAL AT BEDTIME
Refills: 0 | Status: DISCONTINUED | OUTPATIENT
Start: 2019-01-01 | End: 2019-01-01

## 2019-01-01 RX ORDER — PANTOPRAZOLE 40 MG/1
40 TABLET, DELAYED RELEASE ORAL DAILY
Qty: 30 | Refills: 2 | Status: ACTIVE | COMMUNITY
Start: 2018-07-17 | End: 1900-01-01

## 2019-01-01 RX ORDER — LEVOFLOXACIN 250 MG/1
250 TABLET, FILM COATED ORAL DAILY
Qty: 7 | Refills: 0 | Status: ACTIVE | COMMUNITY
Start: 2019-01-01 | End: 1900-01-01

## 2019-01-01 RX ORDER — BUDESONIDE 0.5 MG/2ML
0.5 INHALANT ORAL
Qty: 60 | Refills: 2 | Status: ACTIVE | COMMUNITY
Start: 2017-10-13 | End: 1900-01-01

## 2019-01-01 RX ORDER — HEPARIN SODIUM 5000 [USP'U]/ML
5000 INJECTION INTRAVENOUS; SUBCUTANEOUS EVERY 8 HOURS
Refills: 0 | Status: DISCONTINUED | OUTPATIENT
Start: 2019-01-01 | End: 2019-01-01

## 2019-01-01 RX ORDER — PANTOPRAZOLE SODIUM 20 MG/1
40 TABLET, DELAYED RELEASE ORAL DAILY
Refills: 0 | Status: DISCONTINUED | OUTPATIENT
Start: 2019-01-01 | End: 2019-01-01

## 2019-01-01 RX ORDER — ATORVASTATIN CALCIUM 80 MG/1
1 TABLET, FILM COATED ORAL
Qty: 0 | Refills: 0 | DISCHARGE
Start: 2019-01-01

## 2019-01-01 RX ORDER — SODIUM CHLORIDE 9 MG/ML
500 INJECTION, SOLUTION INTRAVENOUS ONCE
Refills: 0 | Status: DISCONTINUED | OUTPATIENT
Start: 2019-01-01 | End: 2019-01-01

## 2019-01-01 RX ORDER — PANTOPRAZOLE SODIUM 20 MG/1
40 TABLET, DELAYED RELEASE ORAL DAILY
Qty: 0 | Refills: 0 | Status: DISCONTINUED | OUTPATIENT
Start: 2019-01-01 | End: 2019-01-01

## 2019-01-01 RX ORDER — POTASSIUM CHLORIDE 20 MEQ
7.5 PACKET (EA) ORAL
Qty: 0 | Refills: 0 | DISCHARGE

## 2019-01-01 RX ORDER — MORPHINE SULFATE 50 MG/1
2 CAPSULE, EXTENDED RELEASE ORAL ONCE
Refills: 0 | Status: DISCONTINUED | OUTPATIENT
Start: 2019-01-01 | End: 2019-01-01

## 2019-01-01 RX ORDER — IPRATROPIUM/ALBUTEROL SULFATE 18-103MCG
3 AEROSOL WITH ADAPTER (GRAM) INHALATION
Qty: 0 | Refills: 0 | DISCHARGE
Start: 2019-01-01

## 2019-01-01 RX ORDER — ALPRAZOLAM 0.25 MG
0.25 TABLET ORAL EVERY 12 HOURS
Refills: 0 | Status: DISCONTINUED | OUTPATIENT
Start: 2019-01-01 | End: 2019-01-01

## 2019-01-01 RX ORDER — MONTELUKAST 4 MG/1
10 TABLET, CHEWABLE ORAL DAILY
Refills: 0 | Status: DISCONTINUED | OUTPATIENT
Start: 2019-01-01 | End: 2019-01-01

## 2019-01-01 RX ORDER — LEVALBUTEROL HYDROCHLORIDE 1.25 MG/3ML
1.25 SOLUTION RESPIRATORY (INHALATION)
Qty: 144 | Refills: 1 | Status: ACTIVE | COMMUNITY
Start: 2017-10-13 | End: 1900-01-01

## 2019-01-01 RX ORDER — LEVOTHYROXINE SODIUM 125 MCG
1 TABLET ORAL
Qty: 0 | Refills: 0 | DISCHARGE

## 2019-01-01 RX ORDER — SENNA PLUS 8.6 MG/1
1 TABLET ORAL
Qty: 0 | Refills: 0 | DISCHARGE
Start: 2019-01-01

## 2019-01-01 RX ORDER — LEVOTHYROXINE SODIUM 125 MCG
25 TABLET ORAL AT BEDTIME
Refills: 0 | Status: DISCONTINUED | OUTPATIENT
Start: 2019-01-01 | End: 2019-01-01

## 2019-01-01 RX ORDER — SODIUM CHLORIDE 9 MG/ML
500 INJECTION INTRAMUSCULAR; INTRAVENOUS; SUBCUTANEOUS ONCE
Refills: 0 | Status: DISCONTINUED | OUTPATIENT
Start: 2019-01-01 | End: 2019-01-01

## 2019-01-01 RX ORDER — ALBUTEROL SULFATE 90 UG/1
108 (90 BASE) AEROSOL, METERED RESPIRATORY (INHALATION)
Qty: 3 | Refills: 3 | Status: ACTIVE | COMMUNITY
Start: 2017-01-10 | End: 1900-01-01

## 2019-01-01 RX ORDER — ACETAMINOPHEN 500 MG
2 TABLET ORAL
Qty: 0 | Refills: 0 | DISCHARGE
Start: 2019-01-01

## 2019-01-01 RX ORDER — ALPRAZOLAM 0.25 MG
0.25 TABLET ORAL THREE TIMES A DAY
Refills: 0 | Status: DISCONTINUED | OUTPATIENT
Start: 2019-01-01 | End: 2019-01-01

## 2019-01-01 RX ORDER — OXYCODONE HYDROCHLORIDE 5 MG/1
5 TABLET ORAL EVERY 6 HOURS
Refills: 0 | Status: DISCONTINUED | OUTPATIENT
Start: 2019-01-01 | End: 2019-01-01

## 2019-01-01 RX ORDER — MAGNESIUM SULFATE 500 MG/ML
2 VIAL (ML) INJECTION ONCE
Refills: 0 | Status: COMPLETED | OUTPATIENT
Start: 2019-01-01 | End: 2019-01-01

## 2019-01-01 RX ORDER — SODIUM CHLORIDE 9 MG/ML
10 INJECTION INTRAMUSCULAR; INTRAVENOUS; SUBCUTANEOUS ONCE
Refills: 0 | Status: DISCONTINUED | OUTPATIENT
Start: 2019-01-01 | End: 2019-01-01

## 2019-01-01 RX ORDER — FUROSEMIDE 40 MG
10 TABLET ORAL ONCE
Refills: 0 | Status: COMPLETED | OUTPATIENT
Start: 2019-01-01 | End: 2019-01-01

## 2019-01-01 RX ORDER — SODIUM CHLORIDE 9 MG/ML
500 INJECTION INTRAMUSCULAR; INTRAVENOUS; SUBCUTANEOUS
Refills: 0 | Status: COMPLETED | OUTPATIENT
Start: 2019-01-01 | End: 2019-01-01

## 2019-01-01 RX ORDER — IPRATROPIUM/ALBUTEROL SULFATE 18-103MCG
3 AEROSOL WITH ADAPTER (GRAM) INHALATION ONCE
Refills: 0 | Status: COMPLETED | OUTPATIENT
Start: 2019-01-01 | End: 2019-01-01

## 2019-01-01 RX ORDER — SODIUM CHLORIDE 9 MG/ML
250 INJECTION, SOLUTION INTRAVENOUS ONCE
Refills: 0 | Status: COMPLETED | OUTPATIENT
Start: 2019-01-01 | End: 2019-01-01

## 2019-01-01 RX ORDER — ACETAMINOPHEN 500 MG
650 TABLET ORAL EVERY 6 HOURS
Refills: 0 | Status: DISCONTINUED | OUTPATIENT
Start: 2019-01-01 | End: 2019-01-01

## 2019-01-01 RX ORDER — SODIUM CHLORIDE 9 MG/ML
500 INJECTION INTRAMUSCULAR; INTRAVENOUS; SUBCUTANEOUS ONCE
Refills: 0 | Status: COMPLETED | OUTPATIENT
Start: 2019-01-01 | End: 2019-01-01

## 2019-01-01 RX ORDER — ACETAMINOPHEN 500 MG
650 TABLET ORAL ONCE
Refills: 0 | Status: COMPLETED | OUTPATIENT
Start: 2019-01-01 | End: 2019-01-01

## 2019-01-01 RX ORDER — ALBUTEROL 90 UG/1
2 AEROSOL, METERED ORAL EVERY 6 HOURS
Refills: 0 | Status: DISCONTINUED | OUTPATIENT
Start: 2019-01-01 | End: 2019-01-01

## 2019-01-01 RX ORDER — OXYCODONE HYDROCHLORIDE 5 MG/1
1 TABLET ORAL
Qty: 0 | Refills: 0 | DISCHARGE
Start: 2019-01-01

## 2019-01-01 RX ORDER — FERROUS SULFATE 325(65) MG
1 TABLET ORAL
Qty: 0 | Refills: 0 | DISCHARGE
Start: 2019-01-01

## 2019-01-01 RX ORDER — PREDNISONE 20 MG/1
20 TABLET ORAL
Qty: 30 | Refills: 0 | Status: ACTIVE | COMMUNITY
Start: 2019-01-01 | End: 1900-01-01

## 2019-01-01 RX ORDER — SODIUM CHLORIDE 0.65 %
0 AEROSOL, SPRAY (ML) NASAL
Qty: 0 | Refills: 0 | DISCHARGE
Start: 2019-01-01

## 2019-01-01 RX ORDER — AZITHROMYCIN 250 MG/1
250 TABLET, FILM COATED ORAL
Qty: 1 | Refills: 0 | Status: ACTIVE | COMMUNITY
Start: 2018-12-17 | End: 1900-01-01

## 2019-01-01 RX ORDER — ACETAMINOPHEN 500 MG
600 TABLET ORAL ONCE
Refills: 0 | Status: COMPLETED | OUTPATIENT
Start: 2019-01-01 | End: 2019-01-01

## 2019-01-01 RX ORDER — SIMVASTATIN 20 MG/1
40 TABLET, FILM COATED ORAL
Qty: 0 | Refills: 0 | DISCHARGE

## 2019-01-01 RX ORDER — IPRATROPIUM/ALBUTEROL SULFATE 18-103MCG
3 AEROSOL WITH ADAPTER (GRAM) INHALATION EVERY 6 HOURS
Qty: 0 | Refills: 0 | Status: DISCONTINUED | OUTPATIENT
Start: 2019-01-01 | End: 2019-01-01

## 2019-01-01 RX ORDER — LEVOTHYROXINE SODIUM 125 MCG
25 TABLET ORAL AT BEDTIME
Qty: 0 | Refills: 0 | Status: DISCONTINUED | OUTPATIENT
Start: 2019-01-01 | End: 2019-01-01

## 2019-01-01 RX ORDER — ATORVASTATIN CALCIUM 80 MG/1
20 TABLET, FILM COATED ORAL AT BEDTIME
Refills: 0 | Status: DISCONTINUED | OUTPATIENT
Start: 2019-01-01 | End: 2019-01-01

## 2019-01-01 RX ORDER — HEPARIN SODIUM 5000 [USP'U]/ML
5000 INJECTION INTRAVENOUS; SUBCUTANEOUS EVERY 12 HOURS
Refills: 0 | Status: DISCONTINUED | OUTPATIENT
Start: 2019-01-01 | End: 2019-01-01

## 2019-01-01 RX ORDER — CHLORHEXIDINE GLUCONATE 213 G/1000ML
1 SOLUTION TOPICAL DAILY
Refills: 0 | Status: DISCONTINUED | OUTPATIENT
Start: 2019-01-01 | End: 2019-01-01

## 2019-01-01 RX ORDER — PREDNISONE 20 MG/1
20 TABLET ORAL
Qty: 7 | Refills: 0 | Status: ACTIVE | COMMUNITY
Start: 2019-01-01 | End: 1900-01-01

## 2019-01-01 RX ADMIN — Medication 1 SPRAY(S): at 17:30

## 2019-01-01 RX ADMIN — HEPARIN SODIUM 5000 UNIT(S): 5000 INJECTION INTRAVENOUS; SUBCUTANEOUS at 06:15

## 2019-01-01 RX ADMIN — ESCITALOPRAM OXALATE 5 MILLIGRAM(S): 10 TABLET, FILM COATED ORAL at 12:57

## 2019-01-01 RX ADMIN — FINASTERIDE 5 MILLIGRAM(S): 5 TABLET, FILM COATED ORAL at 09:30

## 2019-01-01 RX ADMIN — ESCITALOPRAM OXALATE 5 MILLIGRAM(S): 10 TABLET, FILM COATED ORAL at 12:06

## 2019-01-01 RX ADMIN — SODIUM CHLORIDE 3 MILLILITER(S): 9 INJECTION INTRAMUSCULAR; INTRAVENOUS; SUBCUTANEOUS at 05:29

## 2019-01-01 RX ADMIN — Medication 100 MILLIGRAM(S): at 05:33

## 2019-01-01 RX ADMIN — Medication 300 MILLIGRAM(S): at 02:00

## 2019-01-01 RX ADMIN — Medication 0.25 MILLIGRAM(S): at 12:19

## 2019-01-01 RX ADMIN — Medication 50 MILLIGRAM(S): at 09:30

## 2019-01-01 RX ADMIN — Medication 16 MILLIGRAM(S): at 05:35

## 2019-01-01 RX ADMIN — HEPARIN SODIUM 5000 UNIT(S): 5000 INJECTION INTRAVENOUS; SUBCUTANEOUS at 13:00

## 2019-01-01 RX ADMIN — Medication 0.25 MILLIGRAM(S): at 05:21

## 2019-01-01 RX ADMIN — SODIUM CHLORIDE 75 MILLILITER(S): 9 INJECTION, SOLUTION INTRAVENOUS at 09:30

## 2019-01-01 RX ADMIN — Medication 250 MILLILITER(S): at 21:30

## 2019-01-01 RX ADMIN — Medication 125 MILLILITER(S): at 04:00

## 2019-01-01 RX ADMIN — Medication 208 MILLIGRAM(S): at 05:36

## 2019-01-01 RX ADMIN — OXYCODONE AND ACETAMINOPHEN 1 TABLET(S): 5; 325 TABLET ORAL at 21:30

## 2019-01-01 RX ADMIN — Medication 250 MILLILITER(S): at 00:00

## 2019-01-01 RX ADMIN — Medication 50 MILLIGRAM(S): at 13:53

## 2019-01-01 RX ADMIN — HEPARIN SODIUM 5000 UNIT(S): 5000 INJECTION INTRAVENOUS; SUBCUTANEOUS at 17:20

## 2019-01-01 RX ADMIN — MORPHINE SULFATE 2 MILLIGRAM(S): 50 CAPSULE, EXTENDED RELEASE ORAL at 12:25

## 2019-01-01 RX ADMIN — HEPARIN SODIUM 5000 UNIT(S): 5000 INJECTION INTRAVENOUS; SUBCUTANEOUS at 14:00

## 2019-01-01 RX ADMIN — HEPARIN SODIUM 5000 UNIT(S): 5000 INJECTION INTRAVENOUS; SUBCUTANEOUS at 06:13

## 2019-01-01 RX ADMIN — Medication 0.25 MILLIGRAM(S): at 19:29

## 2019-01-01 RX ADMIN — Medication 50 MICROGRAM(S): at 06:00

## 2019-01-01 RX ADMIN — Medication 100 MILLIGRAM(S): at 18:14

## 2019-01-01 RX ADMIN — FINASTERIDE 5 MILLIGRAM(S): 5 TABLET, FILM COATED ORAL at 12:06

## 2019-01-01 RX ADMIN — Medication 0.25 MILLIGRAM(S): at 17:40

## 2019-01-01 RX ADMIN — SIMVASTATIN 40 MILLIGRAM(S): 20 TABLET, FILM COATED ORAL at 23:00

## 2019-01-01 RX ADMIN — HEPARIN SODIUM 5000 UNIT(S): 5000 INJECTION INTRAVENOUS; SUBCUTANEOUS at 06:26

## 2019-01-01 RX ADMIN — Medication 3 MILLILITER(S): at 21:58

## 2019-01-01 RX ADMIN — Medication 100 MILLIGRAM(S): at 17:30

## 2019-01-01 RX ADMIN — Medication 100 MILLIGRAM(S): at 17:20

## 2019-01-01 RX ADMIN — Medication 975 MILLIGRAM(S): at 11:32

## 2019-01-01 RX ADMIN — Medication 100 MILLIGRAM(S): at 06:10

## 2019-01-01 RX ADMIN — Medication 100 MILLIGRAM(S): at 06:32

## 2019-01-01 RX ADMIN — Medication 5 MILLIGRAM(S): at 06:13

## 2019-01-01 RX ADMIN — MONTELUKAST 10 MILLIGRAM(S): 4 TABLET, CHEWABLE ORAL at 12:19

## 2019-01-01 RX ADMIN — Medication 25 MICROGRAM(S): at 23:03

## 2019-01-01 RX ADMIN — HEPARIN SODIUM 5000 UNIT(S): 5000 INJECTION INTRAVENOUS; SUBCUTANEOUS at 13:47

## 2019-01-01 RX ADMIN — PANTOPRAZOLE SODIUM 40 MILLIGRAM(S): 20 TABLET, DELAYED RELEASE ORAL at 06:00

## 2019-01-01 RX ADMIN — Medication 0.25 MILLIGRAM(S): at 05:33

## 2019-01-01 RX ADMIN — HEPARIN SODIUM 5000 UNIT(S): 5000 INJECTION INTRAVENOUS; SUBCUTANEOUS at 21:15

## 2019-01-01 RX ADMIN — HEPARIN SODIUM 5000 UNIT(S): 5000 INJECTION INTRAVENOUS; SUBCUTANEOUS at 13:13

## 2019-01-01 RX ADMIN — MONTELUKAST 10 MILLIGRAM(S): 4 TABLET, CHEWABLE ORAL at 13:53

## 2019-01-01 RX ADMIN — FINASTERIDE 5 MILLIGRAM(S): 5 TABLET, FILM COATED ORAL at 12:34

## 2019-01-01 RX ADMIN — Medication 100 MILLIGRAM(S): at 06:00

## 2019-01-01 RX ADMIN — OXYCODONE AND ACETAMINOPHEN 1 TABLET(S): 5; 325 TABLET ORAL at 21:00

## 2019-01-01 RX ADMIN — Medication 100 MILLIGRAM(S): at 18:15

## 2019-01-01 RX ADMIN — Medication 100 MILLIGRAM(S): at 05:21

## 2019-01-01 RX ADMIN — Medication 5 MILLIGRAM(S): at 05:21

## 2019-01-01 RX ADMIN — SODIUM CHLORIDE 3 MILLILITER(S): 9 INJECTION INTRAMUSCULAR; INTRAVENOUS; SUBCUTANEOUS at 22:40

## 2019-01-01 RX ADMIN — Medication 1 SPRAY(S): at 06:01

## 2019-01-01 RX ADMIN — OXYCODONE AND ACETAMINOPHEN 1 TABLET(S): 5; 325 TABLET ORAL at 02:20

## 2019-01-01 RX ADMIN — Medication 3 MILLIGRAM(S): at 23:00

## 2019-01-01 RX ADMIN — Medication 5 MILLIGRAM(S): at 05:01

## 2019-01-01 RX ADMIN — HEPARIN SODIUM 5000 UNIT(S): 5000 INJECTION INTRAVENOUS; SUBCUTANEOUS at 05:40

## 2019-01-01 RX ADMIN — Medication 0.25 MILLIGRAM(S): at 20:04

## 2019-01-01 RX ADMIN — Medication 50 MICROGRAM(S): at 05:21

## 2019-01-01 RX ADMIN — SODIUM CHLORIDE 100 MILLILITER(S): 9 INJECTION, SOLUTION INTRAVENOUS at 06:30

## 2019-01-01 RX ADMIN — HEPARIN SODIUM 5000 UNIT(S): 5000 INJECTION INTRAVENOUS; SUBCUTANEOUS at 22:22

## 2019-01-01 RX ADMIN — PANTOPRAZOLE SODIUM 40 MILLIGRAM(S): 20 TABLET, DELAYED RELEASE ORAL at 06:09

## 2019-01-01 RX ADMIN — Medication 3 MILLILITER(S): at 21:05

## 2019-01-01 RX ADMIN — CLOPIDOGREL BISULFATE 75 MILLIGRAM(S): 75 TABLET, FILM COATED ORAL at 12:50

## 2019-01-01 RX ADMIN — HEPARIN SODIUM 5000 UNIT(S): 5000 INJECTION INTRAVENOUS; SUBCUTANEOUS at 21:00

## 2019-01-01 RX ADMIN — TAMSULOSIN HYDROCHLORIDE 0.4 MILLIGRAM(S): 0.4 CAPSULE ORAL at 21:19

## 2019-01-01 RX ADMIN — MONTELUKAST 10 MILLIGRAM(S): 4 TABLET, CHEWABLE ORAL at 12:34

## 2019-01-01 RX ADMIN — TAMSULOSIN HYDROCHLORIDE 0.4 MILLIGRAM(S): 0.4 CAPSULE ORAL at 23:12

## 2019-01-01 RX ADMIN — HEPARIN SODIUM 5000 UNIT(S): 5000 INJECTION INTRAVENOUS; SUBCUTANEOUS at 16:24

## 2019-01-01 RX ADMIN — Medication 240 MILLIGRAM(S): at 09:46

## 2019-01-01 RX ADMIN — Medication 50 MILLIGRAM(S): at 12:49

## 2019-01-01 RX ADMIN — Medication 325 MILLIGRAM(S): at 09:29

## 2019-01-01 RX ADMIN — Medication 3 MILLILITER(S): at 10:24

## 2019-01-01 RX ADMIN — MONTELUKAST 10 MILLIGRAM(S): 4 TABLET, CHEWABLE ORAL at 12:06

## 2019-01-01 RX ADMIN — TAMSULOSIN HYDROCHLORIDE 0.4 MILLIGRAM(S): 0.4 CAPSULE ORAL at 23:57

## 2019-01-01 RX ADMIN — Medication 3 MILLILITER(S): at 15:40

## 2019-01-01 RX ADMIN — Medication 40 MICROGRAM(S): at 22:23

## 2019-01-01 RX ADMIN — Medication 3 MILLILITER(S): at 20:51

## 2019-01-01 RX ADMIN — SODIUM CHLORIDE 75 MILLILITER(S): 9 INJECTION, SOLUTION INTRAVENOUS at 21:15

## 2019-01-01 RX ADMIN — Medication 5 MILLIGRAM(S): at 06:10

## 2019-01-01 RX ADMIN — Medication 0.25 MILLIGRAM(S): at 12:49

## 2019-01-01 RX ADMIN — Medication 125 MILLILITER(S): at 09:00

## 2019-01-01 RX ADMIN — Medication 600 MILLIGRAM(S): at 10:15

## 2019-01-01 RX ADMIN — Medication 3 MILLILITER(S): at 10:35

## 2019-01-01 RX ADMIN — ESCITALOPRAM OXALATE 5 MILLIGRAM(S): 10 TABLET, FILM COATED ORAL at 13:53

## 2019-01-01 RX ADMIN — HEPARIN SODIUM 5000 UNIT(S): 5000 INJECTION INTRAVENOUS; SUBCUTANEOUS at 21:19

## 2019-01-01 RX ADMIN — Medication 975 MILLIGRAM(S): at 12:25

## 2019-01-01 RX ADMIN — ALBUTEROL 2 PUFF(S): 90 AEROSOL, METERED ORAL at 06:42

## 2019-01-01 RX ADMIN — HEPARIN SODIUM 5000 UNIT(S): 5000 INJECTION INTRAVENOUS; SUBCUTANEOUS at 06:09

## 2019-01-01 RX ADMIN — CLOPIDOGREL BISULFATE 75 MILLIGRAM(S): 75 TABLET, FILM COATED ORAL at 12:16

## 2019-01-01 RX ADMIN — Medication 50 MILLIGRAM(S): at 12:18

## 2019-01-01 RX ADMIN — FINASTERIDE 5 MILLIGRAM(S): 5 TABLET, FILM COATED ORAL at 13:52

## 2019-01-01 RX ADMIN — Medication 50 MILLILITER(S): at 16:47

## 2019-01-01 RX ADMIN — Medication 50 MILLIGRAM(S): at 12:57

## 2019-01-01 RX ADMIN — CHLORHEXIDINE GLUCONATE 1 APPLICATION(S): 213 SOLUTION TOPICAL at 23:03

## 2019-01-01 RX ADMIN — ESCITALOPRAM OXALATE 5 MILLIGRAM(S): 10 TABLET, FILM COATED ORAL at 12:50

## 2019-01-01 RX ADMIN — TAMSULOSIN HYDROCHLORIDE 0.4 MILLIGRAM(S): 0.4 CAPSULE ORAL at 21:00

## 2019-01-01 RX ADMIN — Medication 750 MILLIGRAM(S): at 02:15

## 2019-01-01 RX ADMIN — Medication 16 MILLIGRAM(S): at 05:40

## 2019-01-01 RX ADMIN — PANTOPRAZOLE SODIUM 40 MILLIGRAM(S): 20 TABLET, DELAYED RELEASE ORAL at 13:13

## 2019-01-01 RX ADMIN — Medication 5 MILLIGRAM(S): at 06:32

## 2019-01-01 RX ADMIN — Medication 0.25 MILLIGRAM(S): at 07:59

## 2019-01-01 RX ADMIN — TAMSULOSIN HYDROCHLORIDE 0.4 MILLIGRAM(S): 0.4 CAPSULE ORAL at 21:59

## 2019-01-01 RX ADMIN — Medication 325 MILLIGRAM(S): at 13:53

## 2019-01-01 RX ADMIN — SODIUM CHLORIDE 75 MILLILITER(S): 9 INJECTION, SOLUTION INTRAVENOUS at 16:34

## 2019-01-01 RX ADMIN — ALBUTEROL 2 PUFF(S): 90 AEROSOL, METERED ORAL at 23:00

## 2019-01-01 RX ADMIN — HEPARIN SODIUM 5000 UNIT(S): 5000 INJECTION INTRAVENOUS; SUBCUTANEOUS at 14:09

## 2019-01-01 RX ADMIN — Medication 104 MILLIGRAM(S): at 18:00

## 2019-01-01 RX ADMIN — ATORVASTATIN CALCIUM 20 MILLIGRAM(S): 80 TABLET, FILM COATED ORAL at 20:56

## 2019-01-01 RX ADMIN — HEPARIN SODIUM 5000 UNIT(S): 5000 INJECTION INTRAVENOUS; SUBCUTANEOUS at 05:34

## 2019-01-01 RX ADMIN — Medication 5 MILLIGRAM(S): at 05:33

## 2019-01-01 RX ADMIN — Medication 208 MILLIGRAM(S): at 17:46

## 2019-01-01 RX ADMIN — Medication 3 MILLILITER(S): at 04:45

## 2019-01-01 RX ADMIN — Medication 650 MILLIGRAM(S): at 20:21

## 2019-01-01 RX ADMIN — CHLORHEXIDINE GLUCONATE 1 APPLICATION(S): 213 SOLUTION TOPICAL at 12:57

## 2019-01-01 RX ADMIN — Medication 100 MILLIGRAM(S): at 17:29

## 2019-01-01 RX ADMIN — Medication 750 MILLIGRAM(S): at 12:49

## 2019-01-01 RX ADMIN — Medication 100 MILLIGRAM(S): at 21:57

## 2019-01-01 RX ADMIN — SODIUM CHLORIDE 75 MILLILITER(S): 9 INJECTION, SOLUTION INTRAVENOUS at 07:53

## 2019-01-01 RX ADMIN — SODIUM CHLORIDE 3 MILLILITER(S): 9 INJECTION INTRAMUSCULAR; INTRAVENOUS; SUBCUTANEOUS at 22:02

## 2019-01-01 RX ADMIN — Medication 3 MILLILITER(S): at 17:03

## 2019-01-01 RX ADMIN — Medication 106 MILLIGRAM(S): at 06:29

## 2019-01-01 RX ADMIN — SODIUM CHLORIDE 3 MILLILITER(S): 9 INJECTION INTRAMUSCULAR; INTRAVENOUS; SUBCUTANEOUS at 15:31

## 2019-01-01 RX ADMIN — CLOPIDOGREL BISULFATE 75 MILLIGRAM(S): 75 TABLET, FILM COATED ORAL at 12:06

## 2019-01-01 RX ADMIN — FINASTERIDE 5 MILLIGRAM(S): 5 TABLET, FILM COATED ORAL at 12:49

## 2019-01-01 RX ADMIN — Medication 50 MICROGRAM(S): at 06:10

## 2019-01-01 RX ADMIN — HEPARIN SODIUM 5000 UNIT(S): 5000 INJECTION INTRAVENOUS; SUBCUTANEOUS at 21:16

## 2019-01-01 RX ADMIN — ESCITALOPRAM OXALATE 5 MILLIGRAM(S): 10 TABLET, FILM COATED ORAL at 12:18

## 2019-01-01 RX ADMIN — Medication 650 MILLIGRAM(S): at 03:43

## 2019-01-01 RX ADMIN — SIMVASTATIN 40 MILLIGRAM(S): 20 TABLET, FILM COATED ORAL at 23:58

## 2019-01-01 RX ADMIN — SODIUM CHLORIDE 3 MILLILITER(S): 9 INJECTION INTRAMUSCULAR; INTRAVENOUS; SUBCUTANEOUS at 05:42

## 2019-01-01 RX ADMIN — Medication 3 MILLILITER(S): at 11:03

## 2019-01-01 RX ADMIN — Medication 0.25 MILLIGRAM(S): at 17:23

## 2019-01-01 RX ADMIN — MONTELUKAST 10 MILLIGRAM(S): 4 TABLET, CHEWABLE ORAL at 12:49

## 2019-01-01 RX ADMIN — MONTELUKAST 10 MILLIGRAM(S): 4 TABLET, CHEWABLE ORAL at 21:58

## 2019-01-01 RX ADMIN — SODIUM CHLORIDE 3 MILLILITER(S): 9 INJECTION INTRAMUSCULAR; INTRAVENOUS; SUBCUTANEOUS at 13:10

## 2019-01-01 RX ADMIN — Medication 50 MILLIGRAM(S): at 12:34

## 2019-01-01 RX ADMIN — CLOPIDOGREL BISULFATE 75 MILLIGRAM(S): 75 TABLET, FILM COATED ORAL at 12:34

## 2019-01-01 RX ADMIN — Medication 750 MILLIGRAM(S): at 20:15

## 2019-01-01 RX ADMIN — Medication 104 MILLIGRAM(S): at 18:19

## 2019-01-01 RX ADMIN — ATORVASTATIN CALCIUM 20 MILLIGRAM(S): 80 TABLET, FILM COATED ORAL at 21:58

## 2019-01-01 RX ADMIN — Medication 0.25 MILLIGRAM(S): at 21:32

## 2019-01-01 RX ADMIN — Medication 50 MILLIGRAM(S): at 12:16

## 2019-01-01 RX ADMIN — Medication 3 MILLILITER(S): at 15:43

## 2019-01-01 RX ADMIN — SODIUM CHLORIDE 250 MILLILITER(S): 9 INJECTION, SOLUTION INTRAVENOUS at 16:30

## 2019-01-01 RX ADMIN — Medication 100 MILLIGRAM(S): at 06:15

## 2019-01-01 RX ADMIN — Medication 3 MILLIGRAM(S): at 23:12

## 2019-01-01 RX ADMIN — Medication 40 MICROGRAM(S): at 21:35

## 2019-01-01 RX ADMIN — HEPARIN SODIUM 5000 UNIT(S): 5000 INJECTION INTRAVENOUS; SUBCUTANEOUS at 13:50

## 2019-01-01 RX ADMIN — Medication 0.25 MILLIGRAM(S): at 06:07

## 2019-01-01 RX ADMIN — MORPHINE SULFATE 2 MILLIGRAM(S): 50 CAPSULE, EXTENDED RELEASE ORAL at 12:40

## 2019-01-01 RX ADMIN — PANTOPRAZOLE SODIUM 40 MILLIGRAM(S): 20 TABLET, DELAYED RELEASE ORAL at 05:01

## 2019-01-01 RX ADMIN — Medication 5 MILLIGRAM(S): at 06:15

## 2019-01-01 RX ADMIN — Medication 650 MILLIGRAM(S): at 02:30

## 2019-01-01 RX ADMIN — Medication 50 GRAM(S): at 06:30

## 2019-01-01 RX ADMIN — Medication 63.75 MILLIMOLE(S): at 09:08

## 2019-01-01 RX ADMIN — ESCITALOPRAM OXALATE 5 MILLIGRAM(S): 10 TABLET, FILM COATED ORAL at 12:34

## 2019-01-01 RX ADMIN — Medication 0.25 MILLIGRAM(S): at 17:30

## 2019-01-01 RX ADMIN — Medication 50 MICROGRAM(S): at 06:32

## 2019-01-01 RX ADMIN — Medication 100 MILLIGRAM(S): at 17:40

## 2019-01-01 RX ADMIN — Medication 3 MILLIGRAM(S): at 23:58

## 2019-01-01 RX ADMIN — Medication 3 MILLILITER(S): at 04:23

## 2019-01-01 RX ADMIN — Medication 208 MILLIGRAM(S): at 05:40

## 2019-01-01 RX ADMIN — SODIUM CHLORIDE 75 MILLILITER(S): 9 INJECTION, SOLUTION INTRAVENOUS at 22:34

## 2019-01-01 RX ADMIN — PANTOPRAZOLE SODIUM 40 MILLIGRAM(S): 20 TABLET, DELAYED RELEASE ORAL at 12:18

## 2019-01-01 RX ADMIN — PANTOPRAZOLE SODIUM 40 MILLIGRAM(S): 20 TABLET, DELAYED RELEASE ORAL at 06:32

## 2019-01-01 RX ADMIN — SODIUM CHLORIDE 3 MILLILITER(S): 9 INJECTION INTRAMUSCULAR; INTRAVENOUS; SUBCUTANEOUS at 13:46

## 2019-01-01 RX ADMIN — TAMSULOSIN HYDROCHLORIDE 0.4 MILLIGRAM(S): 0.4 CAPSULE ORAL at 20:56

## 2019-01-01 RX ADMIN — Medication 0.25 MILLIGRAM(S): at 14:00

## 2019-01-01 RX ADMIN — ALBUTEROL 2 PUFF(S): 90 AEROSOL, METERED ORAL at 05:02

## 2019-01-01 RX ADMIN — Medication 0.25 MILLIGRAM(S): at 21:16

## 2019-01-01 RX ADMIN — OXYCODONE AND ACETAMINOPHEN 1 TABLET(S): 5; 325 TABLET ORAL at 01:19

## 2019-01-01 RX ADMIN — Medication 50 MILLIGRAM(S): at 12:06

## 2019-01-01 RX ADMIN — Medication 50 MICROGRAM(S): at 05:01

## 2019-01-01 RX ADMIN — Medication 0.25 MILLIGRAM(S): at 19:21

## 2019-01-01 RX ADMIN — SODIUM CHLORIDE 125 MILLILITER(S): 9 INJECTION INTRAMUSCULAR; INTRAVENOUS; SUBCUTANEOUS at 01:48

## 2019-01-01 RX ADMIN — Medication 260 MILLIGRAM(S): at 02:00

## 2019-01-01 RX ADMIN — Medication 0.25 MILLIGRAM(S): at 23:12

## 2019-01-01 RX ADMIN — HEPARIN SODIUM 5000 UNIT(S): 5000 INJECTION INTRAVENOUS; SUBCUTANEOUS at 23:03

## 2019-01-01 RX ADMIN — Medication 100 MILLIGRAM(S): at 05:01

## 2019-01-01 RX ADMIN — PANTOPRAZOLE SODIUM 40 MILLIGRAM(S): 20 TABLET, DELAYED RELEASE ORAL at 06:15

## 2019-01-01 RX ADMIN — Medication 650 MILLIGRAM(S): at 21:00

## 2019-01-01 RX ADMIN — HEPARIN SODIUM 5000 UNIT(S): 5000 INJECTION INTRAVENOUS; SUBCUTANEOUS at 05:36

## 2019-01-01 RX ADMIN — SODIUM CHLORIDE 100 MILLILITER(S): 9 INJECTION, SOLUTION INTRAVENOUS at 12:19

## 2019-01-01 RX ADMIN — Medication 5 MILLIGRAM(S): at 06:00

## 2019-01-01 RX ADMIN — PANTOPRAZOLE SODIUM 40 MILLIGRAM(S): 20 TABLET, DELAYED RELEASE ORAL at 13:46

## 2019-01-01 RX ADMIN — Medication 104 MILLIGRAM(S): at 05:34

## 2019-01-01 RX ADMIN — Medication 100 MILLIGRAM(S): at 17:15

## 2019-01-01 RX ADMIN — ATORVASTATIN CALCIUM 20 MILLIGRAM(S): 80 TABLET, FILM COATED ORAL at 21:00

## 2019-01-01 RX ADMIN — Medication 3 MILLILITER(S): at 21:46

## 2019-01-01 RX ADMIN — Medication 1 SPRAY(S): at 20:56

## 2019-01-01 RX ADMIN — SODIUM CHLORIDE 30 MILLILITER(S): 9 INJECTION, SOLUTION INTRAVENOUS at 08:41

## 2019-01-01 RX ADMIN — Medication 650 MILLIGRAM(S): at 04:57

## 2019-01-01 RX ADMIN — SODIUM CHLORIDE 50 MILLILITER(S): 9 INJECTION, SOLUTION INTRAVENOUS at 08:00

## 2019-01-01 RX ADMIN — Medication 0.25 MILLIGRAM(S): at 03:48

## 2019-01-01 RX ADMIN — HEPARIN SODIUM 5000 UNIT(S): 5000 INJECTION INTRAVENOUS; SUBCUTANEOUS at 06:32

## 2019-01-01 RX ADMIN — TAMSULOSIN HYDROCHLORIDE 0.4 MILLIGRAM(S): 0.4 CAPSULE ORAL at 21:16

## 2019-01-01 RX ADMIN — Medication 0.25 MILLIGRAM(S): at 09:53

## 2019-01-01 RX ADMIN — HEPARIN SODIUM 5000 UNIT(S): 5000 INJECTION INTRAVENOUS; SUBCUTANEOUS at 21:32

## 2019-01-01 RX ADMIN — Medication 325 MILLIGRAM(S): at 12:34

## 2019-01-01 RX ADMIN — PANTOPRAZOLE SODIUM 40 MILLIGRAM(S): 20 TABLET, DELAYED RELEASE ORAL at 17:47

## 2019-01-01 RX ADMIN — HEPARIN SODIUM 5000 UNIT(S): 5000 INJECTION INTRAVENOUS; SUBCUTANEOUS at 22:34

## 2019-01-01 RX ADMIN — Medication 325 MILLIGRAM(S): at 12:16

## 2019-01-01 RX ADMIN — CLOPIDOGREL BISULFATE 75 MILLIGRAM(S): 75 TABLET, FILM COATED ORAL at 09:30

## 2019-01-01 RX ADMIN — PANTOPRAZOLE SODIUM 40 MILLIGRAM(S): 20 TABLET, DELAYED RELEASE ORAL at 06:22

## 2019-01-01 RX ADMIN — CLOPIDOGREL BISULFATE 75 MILLIGRAM(S): 75 TABLET, FILM COATED ORAL at 12:57

## 2019-01-01 RX ADMIN — Medication 0.25 MILLIGRAM(S): at 18:15

## 2019-01-01 RX ADMIN — MONTELUKAST 10 MILLIGRAM(S): 4 TABLET, CHEWABLE ORAL at 12:16

## 2019-01-01 RX ADMIN — Medication 25 MICROGRAM(S): at 22:22

## 2019-01-01 RX ADMIN — Medication 300 MILLIGRAM(S): at 20:00

## 2019-01-01 RX ADMIN — SODIUM CHLORIDE 250 MILLILITER(S): 9 INJECTION INTRAMUSCULAR; INTRAVENOUS; SUBCUTANEOUS at 21:34

## 2019-01-01 RX ADMIN — FINASTERIDE 5 MILLIGRAM(S): 5 TABLET, FILM COATED ORAL at 12:16

## 2019-01-01 RX ADMIN — Medication 50 MICROGRAM(S): at 06:15

## 2019-01-01 RX ADMIN — CLOPIDOGREL BISULFATE 75 MILLIGRAM(S): 75 TABLET, FILM COATED ORAL at 12:18

## 2019-01-01 RX ADMIN — Medication 10 MILLIGRAM(S): at 06:30

## 2019-01-01 RX ADMIN — ESCITALOPRAM OXALATE 5 MILLIGRAM(S): 10 TABLET, FILM COATED ORAL at 09:30

## 2019-01-01 RX ADMIN — Medication 104 MILLIGRAM(S): at 06:13

## 2019-01-01 RX ADMIN — Medication 0.25 MILLIGRAM(S): at 08:54

## 2019-01-01 RX ADMIN — CLOPIDOGREL BISULFATE 75 MILLIGRAM(S): 75 TABLET, FILM COATED ORAL at 13:53

## 2019-01-01 RX ADMIN — ESCITALOPRAM OXALATE 5 MILLIGRAM(S): 10 TABLET, FILM COATED ORAL at 12:16

## 2019-01-01 RX ADMIN — HEPARIN SODIUM 5000 UNIT(S): 5000 INJECTION INTRAVENOUS; SUBCUTANEOUS at 05:33

## 2019-01-01 RX ADMIN — SODIUM CHLORIDE 75 MILLILITER(S): 9 INJECTION, SOLUTION INTRAVENOUS at 21:33

## 2019-01-01 RX ADMIN — MONTELUKAST 10 MILLIGRAM(S): 4 TABLET, CHEWABLE ORAL at 09:30

## 2019-01-01 RX ADMIN — Medication 300 MILLIGRAM(S): at 12:13

## 2019-01-01 RX ADMIN — Medication 208 MILLIGRAM(S): at 19:52

## 2019-01-01 RX ADMIN — ALBUTEROL 2 PUFF(S): 90 AEROSOL, METERED ORAL at 09:00

## 2019-01-01 RX ADMIN — TAMSULOSIN HYDROCHLORIDE 0.4 MILLIGRAM(S): 0.4 CAPSULE ORAL at 23:00

## 2019-01-01 RX ADMIN — MONTELUKAST 10 MILLIGRAM(S): 4 TABLET, CHEWABLE ORAL at 12:57

## 2019-01-07 ENCOUNTER — APPOINTMENT (OUTPATIENT)
Dept: PULMONOLOGY | Facility: CLINIC | Age: 84
End: 2019-01-07
Payer: MEDICARE

## 2019-01-07 VITALS
SYSTOLIC BLOOD PRESSURE: 110 MMHG | OXYGEN SATURATION: 93 % | DIASTOLIC BLOOD PRESSURE: 60 MMHG | HEART RATE: 120 BPM | WEIGHT: 116 LBS | RESPIRATION RATE: 20 BRPM | HEIGHT: 63 IN | BODY MASS INDEX: 20.55 KG/M2

## 2019-01-07 PROBLEM — J44.9 CHRONIC OBSTRUCTIVE PULMONARY DISEASE, UNSPECIFIED: Chronic | Status: ACTIVE | Noted: 2018-12-25

## 2019-01-07 PROCEDURE — 99213 OFFICE O/P EST LOW 20 MIN: CPT

## 2019-01-07 NOTE — ASSESSMENT
[FreeTextEntry1] : Severe end-stage COPD patient and wife aware of the extent of his disease. Continue to use assisted ventilation as needed and nightly.

## 2019-01-07 NOTE — PHYSICAL EXAM
[General Appearance - Well Developed] : well developed [Normal Appearance] : normal appearance [Well Groomed] : well groomed [General Appearance - Well Nourished] : well nourished [No Deformities] : no deformities [General Appearance - In No Acute Distress] : no acute distress [Normal Conjunctiva] : the conjunctiva exhibited no abnormalities [Eyelids - No Xanthelasma] : the eyelids demonstrated no xanthelasmas [Normal Oropharynx] : normal oropharynx [Neck Appearance] : the appearance of the neck was normal [Neck Cervical Mass (___cm)] : no neck mass was observed [Jugular Venous Distention Increased] : there was no jugular-venous distention [Thyroid Diffuse Enlargement] : the thyroid was not enlarged [Thyroid Nodule] : there were no palpable thyroid nodules [Heart Rate And Rhythm] : heart rate and rhythm were normal [Heart Sounds] : normal S1 and S2 [Murmurs] : no murmurs present [] : no respiratory distress [Respiration, Rhythm And Depth] : normal respiratory rhythm and effort [Exaggerated Use Of Accessory Muscles For Inspiration] : no accessory muscle use [Auscultation Breath Sounds / Voice Sounds] : lungs were clear to auscultation bilaterally [Kyphosis] : kyphosis [Deep Tendon Reflexes (DTR)] : deep tendon reflexes were 2+ and symmetric [Sensation] : the sensory exam was normal to light touch and pinprick [No Focal Deficits] : no focal deficits [Oriented To Time, Place, And Person] : oriented to person, place, and time [Impaired Insight] : insight and judgment were intact [Affect] : the affect was normal [FreeTextEntry1] : walks with cane

## 2019-01-20 ENCOUNTER — RX RENEWAL (OUTPATIENT)
Age: 84
End: 2019-01-20

## 2019-02-11 PROBLEM — C15.5 MALIGNANT NEOPLASM OF LOWER THIRD OF ESOPHAGUS: Status: ACTIVE | Noted: 2017-04-04

## 2019-02-11 NOTE — H&P ADULT - HISTORY OF PRESENT ILLNESS
83y M admitted after f/u visit at thoracic surgery office today with complaints of worsening dysphagia since last Thursday.  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  Pt completed neoadj chemo (5 cycles) and XRT (25 sessions) by the end of May 2017. Pt is now s/p RVATS, robotic assisted MLND RLL wedge resection, EGD, lap, [place] esophagogastrctomy, feeding tube placement on 8/14/17 - postop course complicated by esophageal leak requiring antibiotics.  Pt eventually discharged home and has had multiple readmissions with c/o dysphagia requiring EGD, dilation.

## 2019-02-11 NOTE — H&P ADULT - ASSESSMENT
83y m s/p maryjane logan esophagogastrectomy 8/14/17 with multiple readmissions with c/o dysphagia requiring EGD/dilations - admitted through thoracic surgery office with similar complaints of worsening dysphagia    - Admit patient to 8T  - hold plavix for EGD  - no signs of CHF, gently hydration  - home seizure medications  - GI and DVT prophylaxis  - DNR/DNI code status    Jewell PENNINGTON [id number] 83y m s/p maryjane logan esophagogastrectomy 8/14/17 with multiple readmissions with c/o dysphagia requiring EGD/dilations - admitted through thoracic surgery office with similar complaints of worsening dysphagia    - Admit patient to 8T  - hold plavix for EGD  - CT chest  - no signs of CHF, gently hydration  - home seizure medications  - GI and DVT prophylaxis  - DNR/DNI code status    Jewell PENNINGTON [id number]

## 2019-02-11 NOTE — H&P ADULT - PMH
Advanced COPD    Anxiety    Asthma    BCC (basal cell carcinoma)  skin  BPH (benign prostatic hyperplasia)    Cerebrovascular accident (CVA)    Chronic allergic rhinitis    Coronary artery disease  s/p 3 stents on June 30, 2017 at [place] Dr. Lavelle Reid  Esophagus cancer    Former smoker, stopped smoking in distant past    HTN (hypertension)    Hyperlipidemia    OA (osteoarthritis) of knee  right  Rheumatoid arthritis    Seizure disorder  1 episode approximately 15 yrs ago

## 2019-02-12 NOTE — PROGRESS NOTE ADULT - SUBJECTIVE AND OBJECTIVE BOX
Subjective: "I feel ok but it's hard to get any liquids down" Pt denies cP, sob, abd pain, n/v/d. Amb w min assist.     Vital Signs:  Vital Signs Last 24 Hrs  T(C): 36.3 (02-12-19 @ 09:01), Max: 36.4 (02-11-19 @ 23:50)  T(F): 97.4 (02-12-19 @ 09:01), Max: 97.6 (02-11-19 @ 23:50)  HR: 73 (02-12-19 @ 11:05) (72 - 88)  BP: 138/73 (02-12-19 @ 09:01) (119/53 - 138/73)  RR: 20 (02-12-19 @ 09:01) (18 - 20)  SpO2: 96% (02-12-19 @ 11:05) (95% - 98%) on (O2)    Telemetry/Alarms:  General: WN/WD NAD  Neurology: Awake, nonfocal, MAI x 4  Eyes: Scleras clear, PERRLA/ EOMI, Gross vision intact  ENT:Gross hearing intact, grossly patent pharynx, no stridor  Neck: Neck supple, trachea midline, No JVD,   Respiratory: CTA B/L, No wheezing, rales, rhonchi  CV: RRR, S1S2, no murmurs, rubs or gallops  Abdominal: Soft, NT, ND +BS, +BM yesterday.   Extremities: No edema, + peripheral pulses  Skin: No Rashes, Hematoma, Ecchymosis  Lymphatic: No Neck, axilla, groin LAD  Psych: Oriented x 3, normal affect  Incisions: none    Relevant labs, radiology and Medications reviewed                        11.3   5.61  )-----------( 123      ( 11 Feb 2019 14:30 )             36.3     02-11    142  |  101  |  15  ----------------------------<  63<L>  4.0   |  30  |  0.63    Ca    9.3      11 Feb 2019 14:30    TPro  6.7  /  Alb  3.1<L>  /  TBili  0.3  /  DBili  x   /  AST  40  /  ALT  42<H>  /  AlkPhos  176<H>  02-11    PT/INR - ( 11 Feb 2019 14:30 )   PT: 11.3 SEC;   INR: 1.02          PTT - ( 11 Feb 2019 14:30 )  PTT:31.9 SEC  MEDICATIONS  (STANDING):  ALBUTerol/ipratropium for Nebulization 3 milliLiter(s) Nebulizer every 6 hours  dextrose 5% + sodium chloride 0.45% 1000 milliLiter(s) (50 mL/Hr) IV Continuous <Continuous>  heparin  Injectable 5000 Unit(s) SubCutaneous every 8 hours  levothyroxine Injectable 25 MICROGram(s) IV Push at bedtime  methylPREDNISolone sodium succinate Injectable 16 milliGRAM(s) IV Push daily  pantoprazole  Injectable 40 milliGRAM(s) IV Push daily  phenytoin  IVPB 100 milliGRAM(s) IV Intermittent two times a day  sodium chloride 0.9% lock flush 3 milliLiter(s) IV Push every 8 hours    MEDICATIONS  (PRN):    Pertinent Physical Exam  I&O's Summary    11 Feb 2019 07:01  -  12 Feb 2019 07:00  --------------------------------------------------------  IN: 850 mL / OUT: 1200 mL / NET: -350 mL    12 Feb 2019 07:01  -  12 Feb 2019 13:29  --------------------------------------------------------  IN: 0 mL / OUT: 80 mL / NET: -80 mL    Social: , lives with wife.   No smoking no ETOH    Assessment  83y Male  w/ PAST MEDICAL & SURGICAL HISTORY:  Advanced COPD  Coronary artery disease: s/p 3 stents on June 30, 2017 at [place] Dr. Lavelle Reid  OA (osteoarthritis) of knee: right  Former smoker, stopped smoking in distant past  Rheumatoid arthritis  Seizure disorder: 1 episode approximately 15 yrs ago  Asthma  Hyperlipidemia  BPH (benign prostatic hyperplasia)  HTN (hypertension)  Esophagus cancer  Chronic allergic rhinitis  BCC (basal cell carcinoma): skin  Cerebrovascular accident (CVA)  Anxiety  Anastomotic leak following esophagectomy: [place] logan esophagectomy  History of tonsillectomy  H/O heart artery stent: June 30, 2017  Knee arthropathy: left  History of total hip replacement: left  History of hernia repair  admitted with complaints of Patient is a 83y old  Male who presents with a chief complaint of dysphagia (11 Feb 2019 14:39)  83y M admitted after f/u visit at thoracic surgery office today with complaints of worsening dysphagia since last Thursday.  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  Pt completed neoadj chemo (5 cycles) and XRT (25 sessions) by the end of May 2017. Pt is now s/p RVATS, robotic assisted MLND RLL wedge resection, EGD, lap, [place]an esophagogastrctomy, feeding tube placement on 8/14/17 - postop course complicated by esophageal leak requiring antibiotics.  Pt eventually discharged home and has had multiple readmissions with c/o dysphagia requiring EGD, dilation.    Pt admitted yesterday for worsening dysphagia, dehydration, poor nutritional intake. Kept NPO except sips of water, Plavix held for EGD this week. Given IVF hydration.     PLAN  Neuro: Pain management  Pulm: Encourage coughing, deep breathing and use of incentive spirometry.  Cardio: Monitor telemetry/alarms. Holding plavix for now  GI: Sips of clear liquids as tolerated. Cont IVF hydration. Will obtain CT scan of Chest and Barium swallow  Renal: monitor urine output, supplement electrolytes as needed  Vasc: Heparin SC/SCDs for DVT prophylaxis  Heme: Stable H/H. .   ID: Off antibiotics. Stable.  Therapy: OOB/ambulate  Disposition: Plan EGd/Dilation on Thursday with Dr. Weinstein.   Discussed with Cardiothoracic Team at AM rounds.

## 2019-02-13 NOTE — DISCHARGE NOTE ADULT - CARE PROVIDER_API CALL
Christian Weinstein (MD)  Surgery; Thoracic Surgery  [address] [address] [place] [address] [place] [address]  Phone: [phone]  Fax: [phone]  Follow Up Time:

## 2019-02-13 NOTE — DISCHARGE NOTE ADULT - PATIENT PORTAL LINK FT
You can access the [facility] Patient Portal, offered by [facility], by registering with the following website: [email or web address]

## 2019-02-13 NOTE — DISCHARGE NOTE ADULT - PLAN OF CARE
Tolerance of diet Follow soft to regular food diet as tolerated. Continue to stay upright for meals.   Call Dr. Weinstein if you develop any difficulty eating or drinking again. [phone]  See Dr. Weinstein in 4 weeks.   Follow up with your PCP in 2 weeks.

## 2019-02-13 NOTE — DISCHARGE NOTE ADULT - CARE PLAN
Principal Discharge DX:	Esophageal dysphagia  Goal:	Tolerance of diet  Assessment and plan of treatment:	Follow soft to regular food diet as tolerated. Continue to stay upright for meals.   Call Dr. Weinstein if you develop any difficulty eating or drinking again. [phone]  See Dr. Weinstein in 4 weeks.   Follow up with your PCP in 2 weeks.

## 2019-02-13 NOTE — DIETITIAN INITIAL EVALUATION ADULT. - OTHER INFO
Pt was diagnosed with Ca of the lower third of the Esophagus and underwent a esophagogastrectomy and placement of a feeding tube. Post surgery Pt has had many admissions with the c/o dysphagia and required EGD dilation. Pt has not been able to swallow solid food and most liquids since last Thursday due to dysphasia. Pt is scheduled for dilation 2/14/19.

## 2019-02-13 NOTE — DISCHARGE NOTE ADULT - HOSPITAL COURSE
83y M admitted after f/u visit at thoracic surgery office today with complaints of worsening dysphagia since last Thursday.  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  Pt completed neoadj chemo (5 cycles) and XRT (25 sessions) by the end of May 2017. Pt is now s/p RVATS, robotic assisted MLND RLL wedge resection, EGD, lap, [place] esophagogastrctomy, feeding tube placement on 8/14/17 - postop course complicated by esophageal leak requiring antibiotics.  Pt eventually discharged home and has had multiple readmissions with c/o dysphagia requiring EGD, dilation  Pt sent for Barium swallow and CT scan of chest that showed no delay or stricture. Pt was then advanced to full liquid diet and tolerated it well. Today tolerated soft diet. No indication for EGD/dilation at this time. Pt feels well. Wants to go home. Ambulating frequently. Ok to restart all meds. Cleared for d/c to home by Dr. Weinstein.

## 2019-02-13 NOTE — DISCHARGE NOTE ADULT - NS AS ACTIVITY OBS
Walking-Outdoors allowed/Sex allowed/Return to Work/School allowed/Bathing allowed/Stairs allowed/No Heavy lifting/straining/Do not make important decisions/Do not drive or operate machinery/Showering allowed/Driving allowed/Walking-Indoors allowed

## 2019-02-13 NOTE — DISCHARGE NOTE ADULT - MEDICATION SUMMARY - MEDICATIONS TO TAKE
I will START or STAY ON the medications listed below when I get home from the hospital:    finasteride 5 mg oral tablet  -- 1 tab(s) by mouth once a day  -- Indication: For bph    predniSONE 20 mg oral tablet  -- 1 tab(s) by mouth once a day  -- Indication: For steroid    tamsulosin 0.4 mg oral capsule  -- 1 cap(s) by mouth once a day (at bedtime)  -- Indication: For bph    phenytoin 100 mg oral capsule, extended release  -- 1 cap(s) by mouth 2 times a day  -- Indication: For seizure    escitalopram 5 mg oral tablet  -- 1 tab(s) by mouth once a day  -- Indication: For anti depressant    ezetimibe 10 mg oral tablet  -- 1 tab(s) by mouth once a day  -- Indication: For cholesterol    simvastatin  -- 40 milligram(s) by mouth once a day  -- Indication: For cholesterol    clopidogrel 75 mg oral tablet  -- 1 tab(s) by mouth once a day  -- Indication: For anti platelet    ALPRAZolam 0.25 mg oral tablet  -- 1 tab(s) by mouth every 8 hours, As needed, anxiety  -- Indication: For anxiety    ProAir HFA 90 mcg/inh inhalation aerosol  -- 2 puff(s) inhaled 4 times a day  -- Indication: For breathing    montelukast 10 mg oral tablet  -- 1 tab(s) by mouth once a day  -- Indication: For allergies    Azithromycin 3 Day Dose Pack 500 mg oral tablet  -- 1 tab(s) by mouth once a day  -- Indication: For antibiotic-only take if you were taking prior to you coming to the hospital.     pantoprazole 40 mg oral delayed release tablet  -- 1 tab(s) by mouth once a day (before a meal)  -- Indication: For stomach protection    levothyroxine 50 mcg (0.05 mg) oral tablet  -- 1 tab(s) by mouth once a day  -- Indication: For thyroid I will START or STAY ON the medications listed below when I get home from the hospital:    finasteride 5 mg oral tablet  -- 1 tab(s) by mouth once a day  -- Indication: For home med    predniSONE 20 mg oral tablet  -- 1 tab(s) by mouth once a day  -- Indication: For home med    tamsulosin 0.4 mg oral capsule  -- 1 cap(s) by mouth once a day (at bedtime)  -- Indication: For home med    phenytoin 100 mg oral capsule, extended release  -- 1 cap(s) by mouth 2 times a day  -- Indication: For home med    escitalopram 5 mg oral tablet  -- 1 tab(s) by mouth once a day  -- Indication: For home med    ezetimibe 10 mg oral tablet  -- 1 tab(s) by mouth once a day  -- Indication: For home med    simvastatin  -- 40 milligram(s) by mouth once a day  -- Indication: For home med    clopidogrel 75 mg oral tablet  -- 1 tab(s) by mouth once a day  -- Indication: For home med    ALPRAZolam 0.25 mg oral tablet  -- 1 tab(s) by mouth every 8 hours, As needed, anxiety  -- Indication: For home med    ProAir HFA 90 mcg/inh inhalation aerosol  -- 2 puff(s) inhaled 4 times a day  -- Indication: For home med    montelukast 10 mg oral tablet  -- 1 tab(s) by mouth once a day  -- Indication: For home med    Azithromycin 3 Day Dose Pack 500 mg oral tablet  -- 1 tab(s) by mouth once a day  -- Indication: For take if you were taking prior to admission    pantoprazole 40 mg oral delayed release tablet  -- 1 tab(s) by mouth once a day (before a meal)  -- Indication: For home med    levothyroxine 50 mcg (0.05 mg) oral tablet  -- 1 tab(s) by mouth once a day  -- Indication: For home med

## 2019-03-12 NOTE — H&P ADULT - NSHPPOASURGSITEINCISION_GEN_ALL_CORE
[Fever] : no fever [Night Sweats] : no night sweats [Chest Pain] : no chest pain [Orthopena] : no orthopnea no [Shortness Of Breath] : no shortness of breath [Wheezing] : no wheezing [Vomiting] : no vomiting

## 2019-03-28 NOTE — REVIEW OF SYSTEMS
[Dyspnea] : dyspnea [Negative] : Sleep Disorder [FreeTextEntry8] : dyspnea improved and is at baseline

## 2019-03-28 NOTE — PHYSICAL EXAM
[General Appearance - Well Developed] : well developed [Normal Appearance] : normal appearance [Well Groomed] : well groomed [General Appearance - Well Nourished] : well nourished [No Deformities] : no deformities [General Appearance - In No Acute Distress] : no acute distress [Normal Conjunctiva] : the conjunctiva exhibited no abnormalities [Eyelids - No Xanthelasma] : the eyelids demonstrated no xanthelasmas [Normal Oropharynx] : normal oropharynx [Neck Appearance] : the appearance of the neck was normal [Neck Cervical Mass (___cm)] : no neck mass was observed [Jugular Venous Distention Increased] : there was no jugular-venous distention [Thyroid Diffuse Enlargement] : the thyroid was not enlarged [Thyroid Nodule] : there were no palpable thyroid nodules [Heart Rate And Rhythm] : heart rate and rhythm were normal [Heart Sounds] : normal S1 and S2 [Murmurs] : no murmurs present [] : no respiratory distress [Respiration, Rhythm And Depth] : normal respiratory rhythm and effort [Exaggerated Use Of Accessory Muscles For Inspiration] : no accessory muscle use [Auscultation Breath Sounds / Voice Sounds] : lungs were clear to auscultation bilaterally [Kyphosis] : kyphosis [FreeTextEntry1] : walks with cane [Deep Tendon Reflexes (DTR)] : deep tendon reflexes were 2+ and symmetric [Sensation] : the sensory exam was normal to light touch and pinprick [No Focal Deficits] : no focal deficits [Oriented To Time, Place, And Person] : oriented to person, place, and time [Impaired Insight] : insight and judgment were intact [Affect] : the affect was normal

## 2019-04-05 NOTE — PHYSICAL THERAPY INITIAL EVALUATION ADULT - GAIT DISTANCE, PT EVAL
Pharmacy is calling regarding Medication cannot be ordered for Benzoyl Perox-Hydrocortisone 5-0.5 % Lotion. Writer advised caller of a callback from the nurse within 24-72 hours.     Patient Name: Stephanie Roy  Caller Name: Viet  Name of Facility: [facility]   Callback Number: [phone]  Fax Number: [phone]  Additional Info: Viet from [facility] is requesting an alternate medication for Benzoyl Perox-Hydrocortisone 5-0.5 % Lotion or for it to be sent to another Pharmacy. Viet states they cannot order this medication because they never heard of it before. If it needs to be sent to another Pharmacy it will have to be sent to a NON [facility] pharmacy because non of the [facility] carry this medication.   Did you confirm the message with the caller?: yes    Thank you,  Alejandrina Lee   50 feet

## 2019-05-09 PROBLEM — R53.83 FATIGUE: Status: ACTIVE | Noted: 2019-01-01

## 2019-05-09 NOTE — PHYSICAL EXAM
[Normal Appearance] : normal appearance [General Appearance - Well Developed] : well developed [Well Groomed] : well groomed [No Deformities] : no deformities [General Appearance - Well Nourished] : well nourished [General Appearance - In No Acute Distress] : no acute distress [Normal Conjunctiva] : the conjunctiva exhibited no abnormalities [Normal Oropharynx] : normal oropharynx [Neck Appearance] : the appearance of the neck was normal [Eyelids - No Xanthelasma] : the eyelids demonstrated no xanthelasmas [Neck Cervical Mass (___cm)] : no neck mass was observed [Thyroid Diffuse Enlargement] : the thyroid was not enlarged [Jugular Venous Distention Increased] : there was no jugular-venous distention [Heart Rate And Rhythm] : heart rate and rhythm were normal [Thyroid Nodule] : there were no palpable thyroid nodules [Murmurs] : no murmurs present [Heart Sounds] : normal S1 and S2 [] : no respiratory distress [Exaggerated Use Of Accessory Muscles For Inspiration] : no accessory muscle use [Respiration, Rhythm And Depth] : normal respiratory rhythm and effort [Auscultation Breath Sounds / Voice Sounds] : lungs were clear to auscultation bilaterally [Kyphosis] : kyphosis [FreeTextEntry1] : walks with cane [Nail Clubbing] : no clubbing of the fingernails [Cyanosis, Localized] : no localized cyanosis [Deep Tendon Reflexes (DTR)] : deep tendon reflexes were 2+ and symmetric [Sensation] : the sensory exam was normal to light touch and pinprick [No Focal Deficits] : no focal deficits [Impaired Insight] : insight and judgment were intact [Oriented To Time, Place, And Person] : oriented to person, place, and time [Affect] : the affect was normal

## 2019-05-09 NOTE — ASSESSMENT
[FreeTextEntry1] : Issue more fatigued than usual. Pulmonary exam within normal limits. Must rule out metabolic or anemia as cause of his fatigue

## 2019-05-09 NOTE — PHYSICAL EXAM
[Normal Appearance] : normal appearance [General Appearance - Well Developed] : well developed [Well Groomed] : well groomed [General Appearance - Well Nourished] : well nourished [No Deformities] : no deformities [Normal Conjunctiva] : the conjunctiva exhibited no abnormalities [General Appearance - In No Acute Distress] : no acute distress [Normal Oropharynx] : normal oropharynx [Neck Appearance] : the appearance of the neck was normal [Eyelids - No Xanthelasma] : the eyelids demonstrated no xanthelasmas [Thyroid Diffuse Enlargement] : the thyroid was not enlarged [Neck Cervical Mass (___cm)] : no neck mass was observed [Jugular Venous Distention Increased] : there was no jugular-venous distention [Heart Rate And Rhythm] : heart rate and rhythm were normal [Thyroid Nodule] : there were no palpable thyroid nodules [Heart Sounds] : normal S1 and S2 [] : no respiratory distress [Murmurs] : no murmurs present [Auscultation Breath Sounds / Voice Sounds] : lungs were clear to auscultation bilaterally [Exaggerated Use Of Accessory Muscles For Inspiration] : no accessory muscle use [Respiration, Rhythm And Depth] : normal respiratory rhythm and effort [Kyphosis] : kyphosis [FreeTextEntry1] : walks with cane [Nail Clubbing] : no clubbing of the fingernails [Cyanosis, Localized] : no localized cyanosis [Deep Tendon Reflexes (DTR)] : deep tendon reflexes were 2+ and symmetric [No Focal Deficits] : no focal deficits [Sensation] : the sensory exam was normal to light touch and pinprick [Oriented To Time, Place, And Person] : oriented to person, place, and time [Impaired Insight] : insight and judgment were intact [Affect] : the affect was normal

## 2019-06-27 NOTE — PHYSICAL EXAM
[General Appearance - Well Developed] : well developed [Normal Appearance] : normal appearance [General Appearance - Well Nourished] : well nourished [Well Groomed] : well groomed [General Appearance - In No Acute Distress] : no acute distress [No Deformities] : no deformities [Normal Conjunctiva] : the conjunctiva exhibited no abnormalities [Eyelids - No Xanthelasma] : the eyelids demonstrated no xanthelasmas [Neck Appearance] : the appearance of the neck was normal [Normal Oropharynx] : normal oropharynx [Neck Cervical Mass (___cm)] : no neck mass was observed [Jugular Venous Distention Increased] : there was no jugular-venous distention [Thyroid Diffuse Enlargement] : the thyroid was not enlarged [Thyroid Nodule] : there were no palpable thyroid nodules [Heart Rate And Rhythm] : heart rate and rhythm were normal [Heart Sounds] : normal S1 and S2 [Murmurs] : no murmurs present [] : no respiratory distress [Respiration, Rhythm And Depth] : normal respiratory rhythm and effort [Exaggerated Use Of Accessory Muscles For Inspiration] : no accessory muscle use [Auscultation Breath Sounds / Voice Sounds] : lungs were clear to auscultation bilaterally [Kyphosis] : kyphosis [FreeTextEntry1] : walks with cane [Nail Clubbing] : no clubbing of the fingernails [Cyanosis, Localized] : no localized cyanosis [Deep Tendon Reflexes (DTR)] : deep tendon reflexes were 2+ and symmetric [No Focal Deficits] : no focal deficits [Sensation] : the sensory exam was normal to light touch and pinprick [Oriented To Time, Place, And Person] : oriented to person, place, and time [Impaired Insight] : insight and judgment were intact [Affect] : the affect was normal

## 2019-07-17 NOTE — H&P PST ADULT - ASSESSMENT
82 yr old male with hx of cancer of esophagus, s/p esophagectomy presents for EGD possible dilation.

## 2019-07-17 NOTE — H&P PST ADULT - NSICDXPASTSURGICALHX_GEN_ALL_CORE_FT
risk factors PAST SURGICAL HISTORY:  Anastomotic leak following esophagectomy [place] [facility] esophagectomy    H/O heart artery stent June 30, 2017    History of hernia repair     History of tonsillectomy     History of total hip replacement left    Knee arthropathy left

## 2019-07-17 NOTE — H&P PST ADULT - PRO PAIN LIFE ADAPT
inability to enjoy life/decreased activity level/inability to sleep inability to enjoy life/inability to sleep

## 2019-07-17 NOTE — H&P PST ADULT - NSICDXFAMILYHX_GEN_ALL_CORE_FT
FAMILY HISTORY:  Family history of heart attack  Family history of pulmonary embolism, father @ 49 yr old    Sibling  Still living? No  FH: CAD (coronary artery disease), Age at diagnosis: Age Unknown

## 2019-07-17 NOTE — H&P PST ADULT - HISTORY OF PRESENT ILLNESS
82 yr old male with hx of cancer of esophagus s/p chemo and radiation therapy presents for preop evaluation.  Pt is now post Esophagogastroduodenoscopy, Thoracoscopic Mobilization of Stomach, Robotic Assisted Laparoscopic Esophagogastrectomy Feeding Jejunostomy, Tube, Possible Open on 07/14/17.  Of note upon physical exam for medical/cardiac clearance pt was sent for coronary ct [place] and was noted have blockage, pt then had angiogram and bare metal stents x 3 placed June 30, 2017. 82 yr old male with hx of cancer of esophagus s/p chemo and radiation therapy presents for preop evaluation.  Pt is now post Esophagogastroduodenoscopy, Thoracoscopic Mobilization of Stomach, Robotic Assisted Laparoscopic Esophagogastrectomy Feeding Jejunostomy, Tube, Possible Open on 07/14/17.  Of note upon physical exam for medical/cardiac clearance pt was sent for coronary ct [place] and was noted have blockage, pt then had angiogram and bare metal stents x 3 placed June 30, 2017.  Presents today for EGD/possible dilation for dysphagia. 82 yr old male with hx of cancer of esophagus s/p chemo and radiation therapy.  Pt is now post Esophagogastroduodenoscopy, Thoracoscopic Mobilization of Stomach, Robotic Assisted Laparoscopic Esophagogastrectomy Feeding Jejunostomy Tube on 07/14/17.  Of note upon physical exam for medical/cardiac clearance pt was sent for coronary ct val[place] and was noted have blockage, pt then had angiogram and bare metal stents x 3 placed June 30, 2017.  Presents today for EGD/possible dilation for dysphagia.

## 2019-07-17 NOTE — H&P PST ADULT - RS GEN PE MLT RESP DETAILS PC
breath sounds equal/respirations non-labored/no rales/no wheezes/no rhonchi/clear to auscultation bilaterally

## 2019-07-17 NOTE — H&P PST ADULT - GASTROINTESTINAL DETAILS
no distention/no rebound tenderness/no rigidity/soft/bowel sounds normal/no masses palpable/no guarding/no organomegaly/no bruit/nontender

## 2019-08-08 NOTE — PROGRESS NOTE ADULT - ASSESSMENT
Return visit 1 year  Patient Education     Well-Child Checkup: 14 to 18 Years     Stay involved in your teen’s life. Make sure your teen knows you’re always there when he or she needs to talk.   During the teen years, it’s important to keep having yearly checkups. Your teen may be embarrassed about having a checkup. Reassure your teen that the exam is normal and necessary. Be aware that the healthcare provider may ask to talk with your child without you in the exam room.  School and social issues  Here are some topics you, your teen, and the healthcare provider may want to discuss during this visit:  · School performance. How is your child doing in school? Is homework finished on time? Does your child stay organized? These are skills you can help with. Keep in mind that a drop in school performance can be a sign of other problems.  · Friendships. Do you like your child’s friends? Do the friendships seem healthy? Make sure to talk to your teen about who his or her friends are and how they spend time together. Peer pressure can be a problem among teenagers.  · Life at home. How is your child’s behavior? Does he or she get along with others in the family? Is he or she respectful of you, other adults, and authority? Does your child participate in family events, or does he or she withdraw from other family members?  · Risky behaviors. Many teenagers are curious about drugs, alcohol, smoking, and sex. Talk openly about these issues. Answer your child’s questions, and don’t be afraid to ask questions of your own. If you’re not sure how to approach these topics, talk to the healthcare provider for advice.   Puberty  Your teen may still be experiencing some of the changes of puberty, such as:  · Acne and body odor. Hormones that increase during puberty can cause acne (pimples) on the face and body. Hormones can also increase sweating and cause a stronger body odor.  · Body changes. The body grows and matures during puberty.  Hair will grow in the pubic area and on other parts of the body. Girls grow breasts and menstruate (have monthly periods). A boy’s voice changes, becoming lower and deeper. As the penis matures, erections and wet dreams will start to happen. Talk to your teen about what to expect, and help him or her deal with these changes when possible.  · Emotional changes. Along with these physical changes, you’ll likely notice changes in your teen’s personality. He or she may develop an interest in dating and becoming “more than friends” with other kids. Also, it’s normal for your teen to be dow. Try to be patient and consistent. Encourage conversations, even when he or she doesn’t seem to want to talk. No matter how your teen acts, he or she still needs a parent.  Nutrition and exercise tips  Your teenager likely makes his or her own decisions about what to eat and how to spend free time. You can’t always have the final say, but you can encourage healthy habits. Your teen should:  · Get at least 30 to 60 minutes of physical activity every day. This time can be broken up throughout the day. After-school sports, dance or martial arts classes, riding a bike, or even walking to school or a friend’s house counts as activity.    · Limit “screen time” to 1 hour each day. This includes time spent watching TV, playing video games, using the computer, and texting. If your teen has a TV, computer, or video game console in the bedroom, consider replacing it with a music player.   · Eat healthy. Your child should eat fruits, vegetables, lean meats, and whole grains every day. Less healthy foods--like french fries, candy, and chips--should be eaten rarely. Some teens fall into the trap of snacking on junk food and fast food throughout the day. Make sure the kitchen is stocked with healthy choices for after-school snacks. If your teen does choose to eat junk food, consider making him or her buy it with his or her own money.   · Eat 3 meals  Encephalopathy due to Dilantin toxicity and polypharmacy as well as other medical co morbidities including multiple CVA, COPD, h/o esophageal cancer, etc.  Doubt new CVA or other neuromuscular syndrome.    REC:  repeat Dilantin level daily for next 3 days, and restart Dilantin at lower dose of 100m BID on Saturday 9/29.  Ultimately I think the Dilantin could be gradually tapered and D/C'd. a day. Many kids skip breakfast and even lunch. Not only is this unhealthy, it can also hurt school performance. Make sure your teen eats breakfast. If your teen does not like the food served at school for lunch, allow him or her to prepare a bag lunch.  · Have at least one family meal with you each day. Busy schedules often limit time for sitting and talking. Sitting and eating together allows for family time. It also lets you see what and how your child eats.   · Limit soda and juice drinks. A small soda is OK once in a while. But soda, sports drinks, and juice drinks are no substitute for healthier drinks. Sports and juice drinks are no better. Water and low-fat or nonfat milk are the best choices.  Hygiene tips  Recommendations for good hygiene include the following:   · Teenagers should bathe or shower daily and use deodorant.  · Let the healthcare provider know if you or your teen have questions about hygiene or acne.  · Bring your teen to the dentist at least twice a year for teeth cleaning and a checkup.  · Remind your teen to brush and floss his or her teeth before bed.  Sleeping tips  During the teen years, sleep patterns may change. Many teenagers have a hard time falling asleep. This can lead to sleeping late the next morning. Here are some tips to help your teen get the rest he or she needs:  · Encourage your teen to keep a consistent bedtime, even on weekends. Sleeping is easier when the body follows a routine. Don’t let your teen stay up too late at night or sleep in too long in the morning.  · Help your teen wake up, if needed. Go into the bedroom, open the blinds, and get your teen out of bed -- even on weekends or during school vacations.  · Being active during the day will help your child sleep better at night.  · Discourage use of the TV, computer, or video games for at least an hour before your teen goes to bed. (This is good advice for parents, too!)  · Make a rule that cell phones must be  turned off at night.  Safety tips  Recommendations to keep your teen safe include the following:  · Set rules for how your teen can spend time outside of the house. Give your child a nighttime curfew. If your child has a cell phone, check in periodically by calling to ask where he or she is and what he or she is doing.  · Make sure cell phones and portable music players are used safely and responsibly. Help your teen understand that it is dangerous to talk on the phone, text, or listen to music with headphones while he or she is riding a bike or walking outdoors, especially when crossing the street.  · Constant loud music can cause hearing damage, so monitor your teen’s music volume. Many music players let you set a limit for how loud the volume can be turned up. Check the directions for details.  · When your teen is old enough for a ’s license, encourage safe driving. Teach your teen to always wear a seat belt, drive the speed limit, and follow the rules of the road. Do not allow your teenager to text or talk on a cell phone while driving. (And don’t do this yourself! Remember, you set an example.)  · Set rules and limits around driving and use of the car. If your teen gets a ticket or has an accident, there should be consequences. Driving is a privilege that can be taken away if your child doesn’t follow the rules.  · Teach your child to make good decisions about drugs, alcohol, sex, and other risky behaviors. Work together to come up with strategies for staying safe and dealing with peer pressure. Make sure your teenager knows he or she can always come to you for help.  Tests and vaccines  If you have a strong family history of high cholesterol, your teen’s blood cholesterol may be tested at this visit. Based on recommendations from the CDC, at this visit your child may receive the following vaccines:  · Meningococcal  · Influenza (flu), annually  Recognizing signs of depression  It’s normal for teenagers  to have extreme mood swings as a result of their changing hormones. It’s also just a part of growing up. But sometimes a teenager’s mood swings are signs of a larger problem. If your teen seems depressed for more than 2 weeks, you should be concerned. Signs of depression include:  · Use of drugs or alcohol  · Problems in school and at home  · Frequent episodes of running away  · Thoughts or talk of death or suicide  · Withdrawal from family and friends  · Sudden changes in eating or sleeping habits  · Sexual promiscuity or unplanned pregnancy  · Hostile behavior or rage  · Loss of pleasure in life  Depressed teens can be helped with treatment. Talk to your child’s healthcare provider. Or check with your local mental health center, social service agency, or hospital. Assure your teen that his or her pain can be eased. Offer your love and support. If your teen talks about death or suicide, seek help right away.      Next checkup at: _______________________________     PARENT NOTES:  Date Last Reviewed: 12/1/2016 © 2000-2018 [employer]. [address] [place] [address], [place] [address]. All rights reserved. This information is not intended as a substitute for professional medical care. Always follow your healthcare professional's instructions.

## 2019-08-19 NOTE — H&P ADULT - HISTORY OF PRESENT ILLNESS
82 yr old male with hx of cancer of esophagus s/p chemo and radiation therapy.  Pt is now s/p Esophagogastroduodenoscopy, Thoracoscopic Mobilization of Stomach, Robotic Assisted Laparoscopic Esophagogastrectomy Feeding Jejunostomy Tube on 07/14/17. He has returned for EGD, dilation 3x since esophagectomy. He presented with dysphagia since Friday 8/16 and has only been able to tolerate liquids over the weekend.  Last oral intake was ensure at 11am this morning.  He stopped his plavix on Friday knowing that he would likely need another dilation.

## 2019-08-19 NOTE — ED ADULT NURSE NOTE - OBJECTIVE STATEMENT
Break Coverage RN: patient is an 83 y/o M a&ox4 p/w a c/c of inability to tolerate PO in context of esophageal CA as well as weight loss.  Denies fevers/chillsm N/V/D.  Patient has hx of surgeries to widen esophagus to facilitate PO intake, requesting similar surgery today. Patient in nad, resting comfortably at this time.

## 2019-08-19 NOTE — H&P ADULT - NSICDXPASTSURGICALHX_GEN_ALL_CORE_FT
PAST SURGICAL HISTORY:  Anastomotic leak following esophagectomy [place] logan esophagectomy    H/O heart artery stent June 30, 2017    History of hernia repair     History of tonsillectomy     History of total hip replacement left    Knee arthropathy left

## 2019-08-19 NOTE — H&P ADULT - NSICDXPASTMEDICALHX_GEN_ALL_CORE_FT
PAST MEDICAL HISTORY:  Advanced COPD     Anxiety     Asthma     BCC (basal cell carcinoma) skin    BPH (benign prostatic hyperplasia)     Cerebrovascular accident (CVA)     Chronic allergic rhinitis     Coronary artery disease s/p 3 stents on June 30, 2017 at [place] Dr. Lavelle Reid    Esophagus cancer     Former smoker, stopped smoking in distant past     HTN (hypertension)     Hyperlipidemia     OA (osteoarthritis) of knee right    Rheumatoid arthritis     Seizure disorder 1 episode approximately 15 yrs ago

## 2019-08-19 NOTE — ED PROVIDER NOTE - OBJECTIVE STATEMENT
84yom w/ esophageal CA s/p maryjane-logan complicated by anastamotic leaks and stricture p/w several days of worsening dysphagia. Initially was having difficulty swallowing solids but now with difficulty with liquids as well. Denies any foreign body sensation or food bolus currently. No vomiting but does report regurgitation shortly after attempting PO. No chest or abdominal pain. No diarrhea. Normal flatus and BMs today.

## 2019-08-19 NOTE — H&P ADULT - NSHPREVIEWOFSYSTEMS_GEN_ALL_CORE
Review of Systems:    (Data referenced from "H&P PST Adult" 11-Aug-2017 09:35)  · General Symptoms	weight loss 121 to 119 over the weekend   · Negative Skin Symptoms	no itching; no dryness  · Negative Breast Symptoms	no breast tenderness L; no breast tenderness R; no breast lump L; no breast lump R; no nipple discharge L; no nipple discharge R  · Negative Ophthalmologic Symptoms	no lacrimation L; no lacrimation R; no blurred vision L; no blurred vision R  · Ophthalmologic Comments	wears reading glasses  · ENMT Symptoms	hearing difficulty  · Hearing Disturbance	loss  · Hearing Loss	L; R; uses hearing aids  · Negative Respiratory and Thorax Symptoms	no wheezing; no dyspnea; no cough; no hemoptysis; no pleuritic chest pain  · Cardiovascular Symptoms	chest pain  · Cardiovascular Comments	had chest pain on Sunday and had angioplasty on Monday August 7  · Negative Gastrointestinal Symptoms	no nausea; no vomiting; no diarrhea; no constipation; no flatulence; no abdominal pain; no melena  · Negative General Genitourinary Symptoms	no hematuria; no flank pain L; no flank pain R; no urinary hesitancy; normal urinary frequency  · Musculoskeletal Symptoms	arthritis; joint swelling; joint pain  · Negative Neurological Symptoms	no transient paralysis; no weakness; no generalized seizures; no focal seizures; no syncope; no tremors  · Negative Psychiatric Symptoms	no suicidal ideation  · Psychiatric Symptoms	anxiety; insomnia  · Negative Hematology Symptoms	no gum bleeding; no nose bleeding  · Hematology Symptoms	bruises easily; bleeds easily  · Negative Lymphatic Symptoms	no swelling of extremity  · Negative Endocrine Symptoms	no cold intolerance; no heat intolerance; no voice change  · Allergy Types	reactions to medicines

## 2019-08-19 NOTE — ED PROVIDER NOTE - ATTENDING CONTRIBUTION TO CARE
Thorne: 84 yom with hx of esophageal cancer and hx of endoscopic dilation due to stricture. Pt has not been able to eat in 4 days lost 4 lbs. No pain. On exam, pt is well appearing, dry mucosa, clear lungs, abd soft and non tender. no edema, friable skin on left arm with ecchymosis. CT surgery has done previous dilation and food bolus removal and will admit patient. IV placed under US guidance.

## 2019-08-19 NOTE — H&P ADULT - NSHPSOCIALHISTORY_GEN_ALL_CORE
Social History:  (Data referenced from "[facility] Adult" 11-Aug-2017 09:35)  · Marital Status	  2 children - healthy  · Occupation	  · Lives With	spouse     Substance Use History:  (Data referenced from "[facility] Adult" 11-Aug-2017 09:35)  · Substance Use	caffeine  · Caffeine Type	coffee     Alcohol Use History:  (Data referenced from "[facility] Adult" 11-Aug-2017 09:35)  · Have you ever consumed alcohol	never     Tobacco Usage:  (Data referenced from "[facility] Adult" 11-Aug-2017 09:35)  · Tobacco Usage: Former smoker  · Tobacco Type: cigarettes  quit 45 yrs ago     Passive Smoke Exposure:  (Data referenced from "[facility] Adult" 11-Aug-2017 09:35)  · Passive Smoke Exposure	No

## 2019-08-19 NOTE — ED ADULT TRIAGE NOTE - CHIEF COMPLAINT QUOTE
Pt c/o inability to take anything PO x 3 days, no drooling or SOB noted. Pt has esophageal CA with multiple endoscopy to widen esophagus

## 2019-08-19 NOTE — H&P ADULT - ATTENDING COMMENTS
patient with known h/o dysphagia, previously dilated to 45 fr. with resolution of symptoms. now returns with dysphagia to liquids. held plavix last week in anticipation of needing dilation. will admit, npo after midnight. plan for OR for egd dilation as add on.

## 2019-08-19 NOTE — ED ADULT NURSE NOTE - NSIMPLEMENTINTERV_GEN_ALL_ED
Implemented All Fall with Harm Risk Interventions:  [place] to call system. Call bell, personal items and telephone within reach. Instruct patient to call for assistance. Room bathroom lighting operational. Non-slip footwear when patient is off stretcher. Physically safe environment: no spills, clutter or unnecessary equipment. Stretcher in lowest position, wheels locked, appropriate side rails in place. Provide visual cue, wrist band, yellow gown, etc. Monitor gait and stability. Monitor for mental status changes and reorient to person, place, and time. Review medications for side effects contributing to fall risk. Reinforce activity limits and safety measures with patient and family. Provide visual clues: red socks.

## 2019-08-19 NOTE — ED ADULT NURSE REASSESSMENT NOTE - NS ED NURSE REASSESS COMMENT FT1
Received report from day RN Goyo PAVON Pt Aox3, ambulatory, noted IV 20G Rt AC, pt to be NPO at midnight for OR procedure in AM. Pt breathing even and unlabored, VS as charted, wife at bedside. Pt bed assignment CTICU bed 4, report given to floor RN, pt to be transported with EDtech and RN, will continue to monitor.

## 2019-08-19 NOTE — H&P ADULT - NSHPPHYSICALEXAM_GEN_ALL_CORE
· Constitutional	detailed exam  · Constitutional Details	well-groomed  · Eyes	detailed exam  · Eyes Details	PERRL; EOMI; conjunctiva clear  · ENMT	detailed exam  · ENMT Details	ear L; ear R; nose; mouth  · External Ear L	normal  · TMJ L	normal  · Ear Canal L	occluded with wax  · External Ear R	normal  · TMJ R	normal  · Ear Canal R	occluded with wax  · Nose	no discharge; no deviation  · Mouth	normal mouth and gums  · Neck	detailed exam  · Neck Details	supple; no JVD; normal thyroid gland  · Breasts	detailed exam  · Breasts Details	no mass; no tenderness  · Back	detailed exam  · Back Details	ROM intact; kyphosis  · Respiratory	detailed exam  · Respiratory Details	breath sounds equal; respirations non-labored; clear to auscultation bilaterally; no rales; no rhonchi; no wheezes  · Cardiovascular	detailed exam  · Cardiovascular Details	regular rate and rhythm  no murmur  · Cardiovascular Details	positive S1; positive S2  · Gastrointestinal	detailed exam  · GI Normal	soft; nontender; no distention; no masses palpable; bowel sounds normal; no bruit; no rebound tenderness; no guarding; no rigidity; no organomegaly  · Genitourinary	patient refused  · Rectal	patient refused  · Extremities	detailed exam

## 2019-08-19 NOTE — ED PROCEDURE NOTE - PROCEDURE ADDITIONAL DETAILS
20 gauge IV placed under ultrasound guidance using clean conditions after location of vein with b-mode ultrasound. Vascular access performed using dynamic guidance with direct visualization of needle entering vein and location and patency ascertained by direct visualization of catheter in vein and functional catheter.  US performed after to ascertain fro complications. Successful peripheral venous access performed in right basilic vein. No complications.  See image(s) and report in chart.  Hany [id number]

## 2019-08-19 NOTE — ED PROCEDURE NOTE - US VERIFICATION
Yesenia Benavidez RN  ENT Care Coordinator   Otology  [phone]  
Line Functional

## 2019-08-19 NOTE — ED PROVIDER NOTE - PMH
Advanced COPD    Anxiety    Asthma    BCC (basal cell carcinoma)  skin  BPH (benign prostatic hyperplasia)    Cerebrovascular accident (CVA)    Chronic allergic rhinitis    Coronary artery disease  s/p 3 stents on June 30, 2017 at [place] Dr. Lavelle Reid  Esophagus cancer    Former smoker, stopped smoking in distant past    HTN (hypertension)    Hyperlipidemia    OA (osteoarthritis) of knee  right  Rheumatoid arthritis    Seizure disorder  1 episode approximately 15 yrs ago

## 2019-08-20 NOTE — DISCHARGE NOTE PROVIDER - HOSPITAL COURSE
84 y M s/p esophagectomy 2017 c.o dysphagia and presented for his 4th EGD, dilation since esophagectomy.  8/20/19 EGD, dilation. Pt stable for discharge home to advance diet as tolerated.

## 2019-08-20 NOTE — DISCHARGE NOTE PROVIDER - NSDCFUSCHEDAPPT_GEN_ALL_CORE_FT
MEJIA LATIF ; 09/27/2019 ; [facility] MEJIA LATIF ; 09/27/2019 ; [facility]

## 2019-08-20 NOTE — DISCHARGE NOTE PROVIDER - INSTRUCTIONS
8/20 clears only  8/21 advance to full liquid diet and then slowly advance diet to soft diet as tolerated

## 2019-08-20 NOTE — DISCHARGE NOTE PROVIDER - NSDCFUADDAPPT_GEN_ALL_CORE_FT
******resume plavix 8/21/19*****    if you feel dysphagia symptoms again, call thoracic surgery office to schedule EGD, dilation as an outpatient.

## 2019-08-20 NOTE — PROGRESS NOTE ADULT - SUBJECTIVE AND OBJECTIVE BOX
MEJIA LATIF                     [id number]    HPI:  82 yr old male with hx of cancer of esophagus s/p chemo and radiation therapy.  Pt is now s/p Esophagogastroduodenoscopy, Thoracoscopic Mobilization of Stomach, Robotic Assisted Laparoscopic Esophagogastrectomy Feeding Jejunostomy Tube on 07/14/17. He has returned for EGD, dilation 3x since esophagectomy. He presented with dysphagia since Friday 8/16 and has only been able to tolerate liquids over the weekend.  Last oral intake was ensure at 11am this morning.  He stopped his plavix on Friday knowing that he would likely need another dilation. (19 Aug 2019 16:32)      Issues:  Esophagus cancer  Dysphagia  Seizure disorder  Advanced COPD  Coronary artery disease: s/p 3 stents on June 30, 2017  Mainutrition  Anxiety      PAST MEDICAL & SURGICAL HISTORY:  Advanced COPD  Coronary artery disease: s/p 3 stents on June 30, 2017 at [place] Dr. 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  History of tonsillectomy  H/O heart artery stent: June 30, 2017  Knee arthropathy: left  History of total hip replacement: left  History of hernia repair            VITAL SIGNS:  Vital Signs Last 24 Hrs  T(C): 36.3 (19 Aug 2019 21:50), Max: 36.7 (19 Aug 2019 16:09)  T(F): 97.3 (19 Aug 2019 21:50), Max: 98 (19 Aug 2019 16:09)  HR: 77 (19 Aug 2019 22:00) (72 - 78)  BP: 162/86 (19 Aug 2019 21:50) (126/60 - 168/72)  BP(mean): 104 (19 Aug 2019 21:50) (104 - 104)  RR: 20 (19 Aug 2019 22:00) (16 - 20)  SpO2: 95% (19 Aug 2019 22:00) (95% - 100%)    I/Os:   I&O's Detail      CAPILLARY BLOOD GLUCOSE          =======================MEDICATIONS===================  MEDICATIONS  (STANDING):  heparin  Injectable 5000 Unit(s) SubCutaneous every 8 hours  lactated ringers. 1000 milliLiter(s) (75 mL/Hr) IV Continuous <Continuous>  levothyroxine Injectable 25 MICROGram(s) IV Push at bedtime  melatonin 3 milliGRAM(s) Oral at bedtime  phenytoin   Chewable 50 milliGRAM(s) Chew daily  predniSONE   Tablet 5 milliGRAM(s) Oral daily  tamsulosin 0.4 milliGRAM(s) Oral at bedtime    MEDICATIONS  (PRN):  ALPRAZolam 0.25 milliGRAM(s) Oral every 12 hours PRN anxiety      PHYSICAL EXAM============================  General:                         Awake, alert, very thin,  not in any distress  Neuro:                            Moving all extremities to commands.   Respiratory:	Air entry fair and  bilateral conducted sounds                                           Effort even and unlabored.  CV:		Regular rate and rhythm. Normal S1/S2                                          Distal pulses present.  Abdomen:	                     Soft, non-distended. Bowel sounds present   Skin:		No rash.  Extremities:	Warm, no cyanosis or edema.  Palpable pulses    ============================LABS=========================                        11.6   6.68  )-----------( 152      ( 19 Aug 2019 15:50 )             36.3     08-19    138  |  100  |  14  ----------------------------<  86  4.9   |  28  |  0.77    Ca    9.2      19 Aug 2019 15:50  Mg     1.9     08-19    TPro  6.8  /  Alb  3.1<L>  /  TBili  0.3  /  DBili  x   /  AST  78<H>  /  ALT  46<H>  /  AlkPhos  156<H>  08-19    LIVER FUNCTIONS - ( 19 Aug 2019 15:50 )  Alb: 3.1 g/dL / Pro: 6.8 g/dL / ALK PHOS: 156 u/L / ALT: 46 u/L / AST: 78 u/L / GGT: x           PT/INR - ( 19 Aug 2019 15:50 )   PT: 12.2 SEC;   INR: 1.07          PTT - ( 19 Aug 2019 15:50 )  PTT:30.2 SEC        =============================NEUROLOGY============================  H/O Seizure disorder , No recent Sz activity, will restart Dilantin  ==============================RESPIRATORY========================  Pt is on 2  L nasal canula , advanced COPD  Comfortable, not in any distress.  Using incentive spirometry 	  , pulmonary toilet    ============================CARDIOVASCULAR======================  Continue hemodynamic monitoring.  Not on any pressors  Restart plavix tomorrow   =====================RENAL===================  Continue IVF  Monitor I/Os and electrolytes    ====================GASTROINTESTINAL===================  On clears, NPO after MN  Plan: Esophogeal dilation tomorrow  Continue Zofran / Reglan for nausea - PRN	    ========================HEMATOLOGIC/ONCOLOGIC====================  Monitor chest tube output. No signs of active bleeding.   Follow CBC in AM    ============================INFECTIOUS DISEASE========================  Monitor for fever / leukocytosis.      Pt is on GI & DVT prophylaxis  OOB & ambulate       Pertinent clinical, laboratory, radiographic, hemodynamic, echocardiographic, respiratory data, microbiologic data and chart were reviewed and analyzed frequently throughout the course of the day and night  Patient seen, examined and plan discussed with CT Surgery / CTICU team during rounds.    Pt's status discussed with family at bedside, updated status        Nhi Shepard DO FACEP
MEJIA LATIF                     [id number]    HPI:  82 yr old male with hx of cancer of esophagus s/p chemo and radiation therapy.  Pt is now s/p Esophagogastroduodenoscopy, Thoracoscopic Mobilization of Stomach, Robotic Assisted Laparoscopic Esophagogastrectomy Feeding Jejunostomy Tube on 07/14/17. He has returned for EGD, dilation 3x since esophagectomy. He presented with dysphagia since Friday 8/16 and has only been able to tolerate liquids over the weekend.  Last oral intake was ensure at 11am this morning.  He stopped his plavix on Friday knowing that he would likely need another dilation. (19 Aug 2019 16:32)      Issues:  Esophagus cancer  Dysphagia  Seizure disorder  Advanced COPD  Coronary artery disease: s/p 3 stents on June 30, 2017  Mainutrition  Anxiety      PAST MEDICAL & SURGICAL HISTORY:  Advanced COPD  Coronary artery disease: s/p 3 stents on June 30, 2017 at [place] Dr. 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  History of tonsillectomy  H/O heart artery stent: June 30, 2017  Knee arthropathy: left  History of total hip replacement: left  History of hernia repair            VITAL SIGNS:  Vital Signs Last 24 Hrs  T(C): 36.4 (20 Aug 2019 04:00), Max: 36.7 (19 Aug 2019 16:09)  T(F): 97.5 (20 Aug 2019 04:00), Max: 98 (19 Aug 2019 16:09)  HR: 65 (20 Aug 2019 05:00) (65 - 82)  BP: 118/56 (20 Aug 2019 05:00) (101/53 - 168/72)  BP(mean): 70 (20 Aug 2019 05:00) (64 - 104)  RR: 16 (20 Aug 2019 05:00) (16 - 23)  SpO2: 100% (20 Aug 2019 05:00) (95% - 100%)    I/Os:   I&O's Detail    19 Aug 2019 07:01  -  20 Aug 2019 06:17  --------------------------------------------------------  IN:    lactated ringers.: 525 mL    Oral Fluid: 150 mL  Total IN: 675 mL    OUT:    Voided: 225 mL  Total OUT: 225 mL    Total NET: 450 mL          CAPILLARY BLOOD GLUCOSE          =======================MEDICATIONS===================  MEDICATIONS  (STANDING):  heparin  Injectable 5000 Unit(s) SubCutaneous every 8 hours  lactated ringers. 1000 milliLiter(s) (75 mL/Hr) IV Continuous <Continuous>  levothyroxine Injectable 25 MICROGram(s) IV Push at bedtime  melatonin 3 milliGRAM(s) Oral at bedtime  phenytoin  IVPB 150 milliGRAM(s) IV Intermittent <User Schedule>  phenytoin  IVPB 100 milliGRAM(s) IV Intermittent <User Schedule>  predniSONE   Tablet 5 milliGRAM(s) Oral daily  tamsulosin 0.4 milliGRAM(s) Oral at bedtime    MEDICATIONS  (PRN):  ALPRAZolam 0.25 milliGRAM(s) Oral every 12 hours PRN anxiety      PHYSICAL EXAM============================  General:                         Awake, alert, not in any distress  Neuro:                            Moving all extremities to commands.   Respiratory:	Air entry fair and  bilateral conducted sounds                                           Effort even and unlabored.  CV:		Regular rate and rhythm. Normal S1/S2                                          Distal pulses present.  Abdomen:	                     Soft, non-distended. Bowel sounds present   Skin:		No rash.  Extremities:	Warm, no cyanosis or edema.  Palpable pulses    ============================LABS=========================                        10.2   3.94  )-----------( 123      ( 20 Aug 2019 03:30 )             32.5     08-20    140  |  104  |  11  ----------------------------<  72  4.1   |  27  |  0.65    Ca    8.7      20 Aug 2019 03:30  Mg     1.9     08-19    TPro  6.8  /  Alb  3.1<L>  /  TBili  0.3  /  DBili  x   /  AST  78<H>  /  ALT  46<H>  /  AlkPhos  156<H>  08-19    LIVER FUNCTIONS - ( 19 Aug 2019 15:50 )  Alb: 3.1 g/dL / Pro: 6.8 g/dL / ALK PHOS: 156 u/L / ALT: 46 u/L / AST: 78 u/L / GGT: x           PT/INR - ( 19 Aug 2019 15:50 )   PT: 12.2 SEC;   INR: 1.07          PTT - ( 19 Aug 2019 15:50 )  PTT:30.2 SEC        =============================NEUROLOGY============================  H/O Seizure disorder , No recent Sz activity, will restart Dilantin IV  ==============================RESPIRATORY========================  Pt is on 2  L nasal canula , advanced COPD  Comfortable, not in any distress.  Using incentive spirometry 	  pulmonary toilet    ============================CARDIOVASCULAR======================  Continue hemodynamic monitoring.  Not on any pressors  Restart plavix post op  =====================RENAL===================  Continue IVF  Monitor I/Os and electrolytes    ====================GASTROINTESTINAL===================   NPO after MN for OR; Esophageal dilation     Continue Zofran / Reglan for nausea - PRN	    ========================HEMATOLOGIC/ONCOLOGIC====================  No signs of active bleeding.   Follow CBC in AM    ============================INFECTIOUS DISEASE========================  Monitor for fever / leukocytosis.      Pt is on GI & DVT prophylaxis  OOB & ambulate       Pertinent clinical, laboratory, radiographic, hemodynamic, echocardiographic, respiratory data, microbiologic data and chart were reviewed and analyzed frequently throughout the course of the day and night  Patient seen, examined and plan discussed with CT Surgery / CTICU team during rounds.    Pt's status discussed with family at bedside, updated status        Nhi Shepard DO FACEP

## 2019-08-20 NOTE — DISCHARGE NOTE PROVIDER - NSDCACTIVITY_GEN_ALL_CORE
Walking - Indoors allowed/No heavy lifting/straining/Showering allowed/Walking - Outdoors allowed/Stairs allowed/Bathing allowed

## 2019-08-20 NOTE — DISCHARGE NOTE PROVIDER - CARE PROVIDER_API CALL
Christian Weinstein (MD)  Surgery; Thoracic Surgery  [id number] [place] [address]  Phone: [phone]  Fax: [phone]  Follow Up Time:

## 2019-09-11 NOTE — DISCHARGE NOTE ADULT - NS AS DC PROVIDER CONTACT Y/N MULTI
CONCERNS/SYMPTOMS:  Dad states had a fever until Monday and subsided. Checked today and at 98.2F. Now has a pink/red, not raised  rash on cheeks and trunk, arms. Rash is itchy.  Eating ok, taking in less fluids but is taking in water and breastfeeding. Not wanting formula. Having wet diapers however not as wet as usual. More fussy than usual and wanting to be held more. Father denies cough, runny nose/congestion, ear pain. States did have  cough prior to fever but subsided. No one else in family ill. Did return last week from [place]. Were gone 2 weeks. Dad wondering if picked up virus on plane.     PROBLEM LIST CHECKED:  both chart and parent    ALLERGIES:  See [facility] charting    PROTOCOL USED:  Symptoms discussed and advice given per clinic reference: per GUIDELINE-- Rash or redness widespread , Telephone Care Office Protocols, MARIA FERNANDA [place], 15th edition, 2015    MEDICATIONS RECOMMENDED:  none    DISPOSITION:  Home care advice given-apply hydrocortisone to itchy area, can give cool bath for itching, without soap.  Discussed giving fluids to keep hydrated. Dehydration symptoms reviewed.   per guideline  and Refer call to MD Tellez.     Patient/parent agrees with plan and expresses understanding.  Call back if symptoms are not improving or worse.        Lisa Hand RN       Yes

## 2019-09-16 PROBLEM — R06.02 SOB (SHORTNESS OF BREATH): Status: ACTIVE | Noted: 2017-10-13

## 2019-09-16 NOTE — PHYSICAL EXAM
[General Appearance - Well Developed] : well developed [Normal Appearance] : normal appearance [Well Groomed] : well groomed [General Appearance - Well Nourished] : well nourished [No Deformities] : no deformities [General Appearance - In No Acute Distress] : no acute distress [Normal Conjunctiva] : the conjunctiva exhibited no abnormalities [Eyelids - No Xanthelasma] : the eyelids demonstrated no xanthelasmas [Normal Oropharynx] : normal oropharynx [Neck Appearance] : the appearance of the neck was normal [Neck Cervical Mass (___cm)] : no neck mass was observed [Thyroid Diffuse Enlargement] : the thyroid was not enlarged [Jugular Venous Distention Increased] : there was no jugular-venous distention [Thyroid Nodule] : there were no palpable thyroid nodules [Heart Rate And Rhythm] : heart rate and rhythm were normal [Heart Sounds] : normal S1 and S2 [Murmurs] : no murmurs present [] : no respiratory distress [Respiration, Rhythm And Depth] : normal respiratory rhythm and effort [Exaggerated Use Of Accessory Muscles For Inspiration] : no accessory muscle use [Auscultation Breath Sounds / Voice Sounds] : lungs were clear to auscultation bilaterally [Kyphosis] : kyphosis [FreeTextEntry1] : walks with cane [Nail Clubbing] : no clubbing of the fingernails [Cyanosis, Localized] : no localized cyanosis [1+ Pitting] : 1+  pitting [Deep Tendon Reflexes (DTR)] : deep tendon reflexes were 2+ and symmetric [Sensation] : the sensory exam was normal to light touch and pinprick [No Focal Deficits] : no focal deficits [Oriented To Time, Place, And Person] : oriented to person, place, and time [Impaired Insight] : insight and judgment were intact [Affect] : the affect was normal

## 2019-09-16 NOTE — PHYSICAL EXAM
[General Appearance - Well Developed] : well developed [Normal Appearance] : normal appearance [Well Groomed] : well groomed [General Appearance - Well Nourished] : well nourished [No Deformities] : no deformities [General Appearance - In No Acute Distress] : no acute distress [Normal Conjunctiva] : the conjunctiva exhibited no abnormalities [Eyelids - No Xanthelasma] : the eyelids demonstrated no xanthelasmas [Normal Oropharynx] : normal oropharynx [Neck Appearance] : the appearance of the neck was normal [Neck Cervical Mass (___cm)] : no neck mass was observed [Thyroid Diffuse Enlargement] : the thyroid was not enlarged [Jugular Venous Distention Increased] : there was no jugular-venous distention [Thyroid Nodule] : there were no palpable thyroid nodules [Heart Sounds] : normal S1 and S2 [Heart Rate And Rhythm] : heart rate and rhythm were normal [Murmurs] : no murmurs present [] : no respiratory distress [Exaggerated Use Of Accessory Muscles For Inspiration] : no accessory muscle use [Respiration, Rhythm And Depth] : normal respiratory rhythm and effort [Auscultation Breath Sounds / Voice Sounds] : lungs were clear to auscultation bilaterally [Kyphosis] : kyphosis [FreeTextEntry1] : walks with cane [Nail Clubbing] : no clubbing of the fingernails [Cyanosis, Localized] : no localized cyanosis [1+ Pitting] : 1+  pitting [Deep Tendon Reflexes (DTR)] : deep tendon reflexes were 2+ and symmetric [Sensation] : the sensory exam was normal to light touch and pinprick [No Focal Deficits] : no focal deficits [Impaired Insight] : insight and judgment were intact [Oriented To Time, Place, And Person] : oriented to person, place, and time [Affect] : the affect was normal

## 2019-10-07 NOTE — PHYSICAL EXAM
[Normal Appearance] : normal appearance [General Appearance - Well Developed] : well developed [General Appearance - Well Nourished] : well nourished [No Deformities] : no deformities [Well Groomed] : well groomed [General Appearance - In No Acute Distress] : no acute distress [Normal Conjunctiva] : the conjunctiva exhibited no abnormalities [Eyelids - No Xanthelasma] : the eyelids demonstrated no xanthelasmas [Normal Oropharynx] : normal oropharynx [Neck Appearance] : the appearance of the neck was normal [Neck Cervical Mass (___cm)] : no neck mass was observed [Jugular Venous Distention Increased] : there was no jugular-venous distention [Thyroid Diffuse Enlargement] : the thyroid was not enlarged [Thyroid Nodule] : there were no palpable thyroid nodules [Heart Rate And Rhythm] : heart rate and rhythm were normal [Murmurs] : no murmurs present [Heart Sounds] : normal S1 and S2 [] : no respiratory distress [Respiration, Rhythm And Depth] : normal respiratory rhythm and effort [Exaggerated Use Of Accessory Muscles For Inspiration] : no accessory muscle use [Auscultation Breath Sounds / Voice Sounds] : lungs were clear to auscultation bilaterally [Kyphosis] : kyphosis [FreeTextEntry1] : walks with cane [Nail Clubbing] : no clubbing of the fingernails [Cyanosis, Localized] : no localized cyanosis [1+ Pitting] : 1+  pitting [Deep Tendon Reflexes (DTR)] : deep tendon reflexes were 2+ and symmetric [No Focal Deficits] : no focal deficits [Sensation] : the sensory exam was normal to light touch and pinprick [Impaired Insight] : insight and judgment were intact [Oriented To Time, Place, And Person] : oriented to person, place, and time [Affect] : the affect was normal

## 2019-10-24 NOTE — ED ADULT NURSE NOTE - OBJECTIVE STATEMENT
83 y/o male presents to ED via wheelchair, a&ox3 c/o chest pain. Patient has history of esophageal CA, last chemo in April, last radiation in May. States he has always had chest discomfort but was unsure if it was related to CA. Reports he had 3 stents placed on 6/30/17 and felt relief from his chest discomfort for a while. Now takes Plavix and ASA daily.  Now saying chest pain has returned. Described as intermittent right sided chest pressure that sometimes radiates to jaw. States tonight's episode prevented him from falling asleep and lasted longer than usual. Patient also states he has new onset swelling to lower extremities that began prior to stent placement. Denies N/V, dizziness, fever, SOB, abd pain, urinary symptoms. Lungs clear b/l. Abd soft, nontender, nondistended. EKG done in ED. MD at bedside for eval. Patient on CM. Safety and comfort maintained. increased urinary frequency/chronic

## 2019-11-01 PROBLEM — R04.2 COUGH WITH HEMOPTYSIS: Status: ACTIVE | Noted: 2019-01-01

## 2019-11-01 PROBLEM — R05 CHRONIC COUGH: Status: ACTIVE | Noted: 2017-10-13

## 2019-11-11 NOTE — REVIEW OF SYSTEMS
[Hemoptysis] : hemoptysis [Dyspnea] : dyspnea [Negative] : Sleep Disorder [FreeTextEntry8] : dyspnea is at baseline

## 2019-11-11 NOTE — PHYSICAL EXAM
[General Appearance - Well Developed] : well developed [Normal Appearance] : normal appearance [Well Groomed] : well groomed [General Appearance - Well Nourished] : well nourished [No Deformities] : no deformities [General Appearance - In No Acute Distress] : no acute distress [Normal Conjunctiva] : the conjunctiva exhibited no abnormalities [Eyelids - No Xanthelasma] : the eyelids demonstrated no xanthelasmas [Normal Oropharynx] : normal oropharynx [Neck Appearance] : the appearance of the neck was normal [Neck Cervical Mass (___cm)] : no neck mass was observed [Jugular Venous Distention Increased] : there was no jugular-venous distention [Thyroid Diffuse Enlargement] : the thyroid was not enlarged [Thyroid Nodule] : there were no palpable thyroid nodules [Heart Rate And Rhythm] : heart rate and rhythm were normal [Heart Sounds] : normal S1 and S2 [Murmurs] : no murmurs present [] : no respiratory distress [Respiration, Rhythm And Depth] : normal respiratory rhythm and effort [Exaggerated Use Of Accessory Muscles For Inspiration] : no accessory muscle use [Auscultation Breath Sounds / Voice Sounds] : lungs were clear to auscultation bilaterally [Kyphosis] : kyphosis [Nail Clubbing] : no clubbing of the fingernails [Cyanosis, Localized] : no localized cyanosis [1+ Pitting] : 1+  pitting [Deep Tendon Reflexes (DTR)] : deep tendon reflexes were 2+ and symmetric [Sensation] : the sensory exam was normal to light touch and pinprick [No Focal Deficits] : no focal deficits [Oriented To Time, Place, And Person] : oriented to person, place, and time [Impaired Insight] : insight and judgment were intact [Affect] : the affect was normal [FreeTextEntry1] : walks with cane

## 2019-11-18 PROBLEM — R04.2 COUGH WITH HEMOPTYSIS: Status: ACTIVE | Noted: 2019-01-01

## 2019-11-18 NOTE — DISCUSSION/SUMMARY
[FreeTextEntry1] : We had a discussion about tavr. Patient has severe aortic stenosis along with severe COPD and history of underlying esophageal cancer. He is aware that the aortic valve replacement may not significantly benefit him. Prior to further discussions patient will need CAT scan of chest to evaluate for possible cancer of the lungs and also cardiac catheterization. CAT scan of chest will be done first to rule out underlying CA which would make him ineligible for aortic valve repair.

## 2019-11-18 NOTE — REASON FOR VISIT
[Follow-Up] : a follow-up visit [Spouse] : spouse [Cough] : cough [FreeTextEntry2] : slight hemoptysis

## 2019-11-18 NOTE — PHYSICAL EXAM
[General Appearance - Well Developed] : well developed [Well Groomed] : well groomed [Normal Appearance] : normal appearance [General Appearance - Well Nourished] : well nourished [General Appearance - In No Acute Distress] : no acute distress [No Deformities] : no deformities [Normal Conjunctiva] : the conjunctiva exhibited no abnormalities [Eyelids - No Xanthelasma] : the eyelids demonstrated no xanthelasmas [Normal Oropharynx] : normal oropharynx [Neck Appearance] : the appearance of the neck was normal [Neck Cervical Mass (___cm)] : no neck mass was observed [Thyroid Nodule] : there were no palpable thyroid nodules [Jugular Venous Distention Increased] : there was no jugular-venous distention [Thyroid Diffuse Enlargement] : the thyroid was not enlarged [Heart Sounds] : normal S1 and S2 [Murmurs] : no murmurs present [Heart Rate And Rhythm] : heart rate and rhythm were normal [Respiration, Rhythm And Depth] : normal respiratory rhythm and effort [] : no respiratory distress [Kyphosis] : kyphosis [Auscultation Breath Sounds / Voice Sounds] : lungs were clear to auscultation bilaterally [Exaggerated Use Of Accessory Muscles For Inspiration] : no accessory muscle use [Nail Clubbing] : no clubbing of the fingernails [FreeTextEntry1] : walks with cane [1+ Pitting] : 1+  pitting [Cyanosis, Localized] : no localized cyanosis [Sensation] : the sensory exam was normal to light touch and pinprick [Deep Tendon Reflexes (DTR)] : deep tendon reflexes were 2+ and symmetric [No Focal Deficits] : no focal deficits [Oriented To Time, Place, And Person] : oriented to person, place, and time [Impaired Insight] : insight and judgment were intact [Affect] : the affect was normal

## 2019-11-19 NOTE — PROGRESS NOTE ADULT - PROBLEM SELECTOR PLAN 5
+ NSTEMI  prior cath with cad  with PCI and 3 stents placed  echo : mod as, mild diastolic dysfunction  c/w heparin gtt  plan as per sicu, cards on board detailed exam

## 2019-12-09 NOTE — H&P CARDIOLOGY - PMH
Abnormal LFTs    Advanced COPD    Anemia, unspecified type    Anxiety    Anxiety    Aortic valve stenosis, etiology of cardiac valve disease unspecified    Asthma    BCC (basal cell carcinoma)  skin  BPH (benign prostatic hyperplasia)    Cerebrovascular accident (CVA)    Cerebrovascular accident (CVA) due to embolism of cerebral artery    Chronic allergic rhinitis    Coronary artery disease  s/p 3 stents on June 30, 2017 at [place] Dr. Lavelle Reid  Esophagus cancer    Former smoker, stopped smoking in distant past    Herpes zoster without complication    HTN (hypertension)    Hyperlipidemia    Hypothyroidism, unspecified type    OA (osteoarthritis) of knee  right  SOFIA (obstructive sleep apnea)    Pulmonary nodule    Rheumatoid arthritis    Seizure disorder

## 2019-12-09 NOTE — H&P CARDIOLOGY - HISTORY OF PRESENT ILLNESS
84 yr old  male with PMH CAD s/p PCI, AS, HTN, HLD, mild asthma, RA, COPD, seizure disorder, CVA  with peripheral vision loss, esophageal cancer s/p chemo/radiation and esophagectomy, with c/o progressive exertional dyspnea (<1 block onset) over the past 6 months. Also reports orthopnea and peripheral edema. Denies chest pain, dizziness, or syncope. Seen and evaluated by cardiologist, Dr. Leif Santiago, and had echo showing severe AS (see below).Pt reports he was unable to do TINO due to his history of esophageal cancer. Referred for further R/LHC today.     Symptoms:        Angina (Class): 2       Ischemic Symptoms: ESTRELLA    Heart Failure:        Systolic/Diastolic/Combined: diastolic        NYHA Class (within 2 weeks): II    Assessment of LVEF:       EF: 55%       Assessed by: ECHO       Date: 11/14/19    Prior Cardiac Interventions:       PCI's:   < from: Cardiac Cath Lab - Adult (04.17.18 @ 15:55) >    < end of copied text >         CABG: NO      Noninvasive Testing:   Stress Test: Date:        Protocol:        Duration of Exercise:        Symptoms:        EKG Changes:        DTS:        Myocardial Imaging:        Risk Assessment:     Echo:   < from: Transthoracic Echocardiogram (11.14.19 @ 12:34) >  Conclusions:  1. Calcified trileaflet aortic valve with decreased  opening. Peak transaortic valve gradient equals 41 mm Hg,  mean transaortic valve gradient equals 21 mm Hg, estimated  aortic valve area equals 0.9 sqcm (by continuity equation),  aortic valve velocity time integral equals 66 cm,  consistent with severe aortic stenosis. Mild aortic  regurgitation.  2. Severely dilated left atrium.  LA volume index = 59  cc/m2.  3. Moderate concentric left ventricular hypertrophy.  4. Normal left ventricular systolic function. Peak left  ventricular outflow tract gradient equals 3 mm Hg, mean  gradient is equal to 2 mm Hg, LVOT velocity time integral  equals 18 cm.  5. Moderate diastolic dysfunction (Stage II).  6. Right atrial enlargement.  7. Right ventricular enlargement with normal right  ventricular systolic function.  8. Estimated pulmonary artery systolic pressure equals 60  mm Hg, assuming right atrial pressure equals 8 mm Hg,  consistent with moderate pulmonary pressures.  TINO not performed secondary to severe esophageal pathology.    < end of copied text >    Antianginal Therapies: NO       Beta Blockers:         Calcium Channel Blockers:        Long Acting Nitrates:        Ranexa:     Associated Risk Factors:        Cerebrovascular Disease: N/A       Chronic Lung Disease: N/A       Peripheral Arterial Disease: N/A       Chronic Kidney Disease (if yes, what is GFR): N/A       Uncontrolled Diabetes (if yes, what is HgbA1C or FBS): N/A       Poorly Controlled Hypertension (if yes, what is SBP): N/A       Morbid Obesity (if yes, what is BMI): N/A       History of Recent Ventricular Arrhythmia: N/A       Inability to Ambulate Safely: N/A       Need for Therapeutic Anticoagulation: N/A       Antiplatelet or Contrast Allergy: N/A 84 yr old  male with PMH CAD s/p PCI, AS, HTN, HLD, mild asthma, RA, COPD, seizure disorder, CVA  with peripheral vision loss, esophageal cancer s/p chemo/radiation and esophagectomy, with c/o progressive exertional dyspnea (<1 block onset) over the past 6 months. Also reports orthopnea and peripheral edema. Denies chest pain, dizziness, or syncope. Seen and evaluated by cardiologist, Dr. Leif Santiago, and had echo showing severe AS (see below).Pt reports he was unable to do TINO due to his history of esophageal cancer. Referred for further R/LHC today.     Symptoms:        Angina (Class): 2       Ischemic Symptoms: ESTRELLA    Heart Failure:        Systolic/Diastolic/Combined: diastolic        NYHA Class (within 2 weeks): II    Assessment of LVEF:       EF: 55%       Assessed by: ECHO       Date: 11/14/19    Prior Cardiac Interventions:       PCI's:          CABG: NO      Noninvasive Testing:   Stress Test: Date:        Protocol:        Duration of Exercise:        Symptoms:        EKG Changes:        DTS:        Myocardial Imaging:        Risk Assessment:     Echo:   < from: Transthoracic Echocardiogram (11.14.19 @ 12:34) >  Conclusions:  1. Calcified trileaflet aortic valve with decreased  opening. Peak transaortic valve gradient equals 41 mm Hg,  mean transaortic valve gradient equals 21 mm Hg, estimated  aortic valve area equals 0.9 sqcm (by continuity equation),  aortic valve velocity time integral equals 66 cm,  consistent with severe aortic stenosis. Mild aortic  regurgitation.  2. Severely dilated left atrium.  LA volume index = 59  cc/m2.  3. Moderate concentric left ventricular hypertrophy.  4. Normal left ventricular systolic function. Peak left  ventricular outflow tract gradient equals 3 mm Hg, mean  gradient is equal to 2 mm Hg, LVOT velocity time integral  equals 18 cm.  5. Moderate diastolic dysfunction (Stage II).  6. Right atrial enlargement.  7. Right ventricular enlargement with normal right  ventricular systolic function.  8. Estimated pulmonary artery systolic pressure equals 60  mm Hg, assuming right atrial pressure equals 8 mm Hg,  consistent with moderate pulmonary pressures.  TINO not performed secondary to severe esophageal pathology.    < end of copied text >    Antianginal Therapies: NO       Beta Blockers:         Calcium Channel Blockers:        Long Acting Nitrates:        Ranexa:     Associated Risk Factors:        Cerebrovascular Disease: YES       Chronic Lung Disease: YES       Peripheral Arterial Disease: N/A       Chronic Kidney Disease (if yes, what is GFR): N/A       Uncontrolled Diabetes (if yes, what is HgbA1C or FBS): N/A       Poorly Controlled Hypertension (if yes, what is SBP): N/A       Morbid Obesity (if yes, what is BMI): N/A       History of Recent Ventricular Arrhythmia: N/A       Inability to Ambulate Safely: N/A       Need for Therapeutic Anticoagulation: N/A       Antiplatelet or Contrast Allergy: N/A 84 yr old  male with PMH CAD s/p PCI (most recent to D1, LAD), AS, HTN, HLD, mild asthma, RA, COPD, seizure disorder, CVA  with peripheral vision loss, esophageal cancer s/p chemo/radiation and esophagectomy, with c/o progressive exertional dyspnea (<1 block onset) over the past 6 months. Also reports orthopnea and peripheral edema. Denies chest pain, dizziness, or syncope. Seen and evaluated by cardiologist, Dr. Leif Santiago, and had echo showing severe AS (see below).Pt reports he was unable to do TINO due to his history of esophageal cancer. Referred for further R/LHC today.     Symptoms:        Angina (Class): 2       Ischemic Symptoms: ESTRELLA    Heart Failure:        Systolic/Diastolic/Combined: diastolic        NYHA Class (within 2 weeks): II    Assessment of LVEF:       EF: 55%       Assessed by: ECHO       Date: 11/14/19    Prior Cardiac Interventions:       PCI's:   < from: Cardiac Cath Lab - Adult (04.17.18 @ 15:55) >  LAD:   --  Mid LAD: There was a 90 % stenosis.  --  D1: There was a 99 % stenosis.  COMPLICATIONS: There were no complications.  INTERVENTIONAL RECOMMENDATIONS: ASA and Plavix for 3 mths  Prepared and signed by  Lavelle Reid M.D.    < end of copied text >         CABG: NO      Noninvasive Testing:   Stress Test: Date:        Protocol:        Duration of Exercise:        Symptoms:        EKG Changes:        DTS:        Myocardial Imaging:        Risk Assessment:     Echo:   < from: Transthoracic Echocardiogram (11.14.19 @ 12:34) >  Conclusions:  1. Calcified trileaflet aortic valve with decreased  opening. Peak transaortic valve gradient equals 41 mm Hg,  mean transaortic valve gradient equals 21 mm Hg, estimated  aortic valve area equals 0.9 sqcm (by continuity equation),  aortic valve velocity time integral equals 66 cm,  consistent with severe aortic stenosis. Mild aortic  regurgitation.  2. Severely dilated left atrium.  LA volume index = 59  cc/m2.  3. Moderate concentric left ventricular hypertrophy.  4. Normal left ventricular systolic function. Peak left  ventricular outflow tract gradient equals 3 mm Hg, mean  gradient is equal to 2 mm Hg, LVOT velocity time integral  equals 18 cm.  5. Moderate diastolic dysfunction (Stage II).  6. Right atrial enlargement.  7. Right ventricular enlargement with normal right  ventricular systolic function.  8. Estimated pulmonary artery systolic pressure equals 60  mm Hg, assuming right atrial pressure equals 8 mm Hg,  consistent with moderate pulmonary pressures.  TINO not performed secondary to severe esophageal pathology.    < end of copied text >    Antianginal Therapies: NO       Beta Blockers:         Calcium Channel Blockers:        Long Acting Nitrates:        Ranexa:     Associated Risk Factors:        Cerebrovascular Disease: YES       Chronic Lung Disease: YES       Peripheral Arterial Disease: N/A       Chronic Kidney Disease (if yes, what is GFR): N/A       Uncontrolled Diabetes (if yes, what is HgbA1C or FBS): N/A       Poorly Controlled Hypertension (if yes, what is SBP): N/A       Morbid Obesity (if yes, what is BMI): N/A       History of Recent Ventricular Arrhythmia: N/A       Inability to Ambulate Safely: N/A       Need for Therapeutic Anticoagulation: N/A       Antiplatelet or Contrast Allergy: N/A

## 2019-12-11 PROBLEM — R94.5 ABNORMAL RESULTS OF LIVER FUNCTION STUDIES: Chronic | Status: ACTIVE | Noted: 2019-01-01

## 2019-12-11 PROBLEM — R91.1 SOLITARY PULMONARY NODULE: Chronic | Status: ACTIVE | Noted: 2019-01-01

## 2019-12-11 PROBLEM — B02.9 ZOSTER WITHOUT COMPLICATIONS: Chronic | Status: ACTIVE | Noted: 2019-01-01

## 2019-12-11 PROBLEM — I35.0 NONRHEUMATIC AORTIC (VALVE) STENOSIS: Chronic | Status: ACTIVE | Noted: 2019-01-01

## 2019-12-11 PROBLEM — D64.9 ANEMIA, UNSPECIFIED: Chronic | Status: ACTIVE | Noted: 2019-01-01

## 2019-12-11 PROBLEM — E03.9 HYPOTHYROIDISM, UNSPECIFIED: Chronic | Status: ACTIVE | Noted: 2019-01-01

## 2019-12-11 PROBLEM — I63.40 CEREBRAL INFARCTION DUE TO EMBOLISM OF UNSPECIFIED CEREBRAL ARTERY: Chronic | Status: ACTIVE | Noted: 2019-01-01

## 2019-12-11 PROBLEM — G47.33 OBSTRUCTIVE SLEEP APNEA (ADULT) (PEDIATRIC): Chronic | Status: ACTIVE | Noted: 2019-01-01

## 2019-12-11 PROBLEM — F41.9 ANXIETY DISORDER, UNSPECIFIED: Chronic | Status: ACTIVE | Noted: 2019-01-01

## 2019-12-11 PROBLEM — G40.909 EPILEPSY, UNSPECIFIED, NOT INTRACTABLE, WITHOUT STATUS EPILEPTICUS: Chronic | Status: ACTIVE | Noted: 2017-06-30

## 2019-12-14 NOTE — ED ADULT NURSE NOTE - NSIMPLEMENTINTERV_GEN_ALL_ED
Implemented All Fall with Harm Risk Interventions:  [place] to call system. Call bell, personal items and telephone within reach. Instruct patient to call for assistance. Room bathroom lighting operational. Non-slip footwear when patient is off stretcher. Physically safe environment: no spills, clutter or unnecessary equipment. Stretcher in lowest position, wheels locked, appropriate side rails in place. Provide visual cue, wrist band, yellow gown, etc. Monitor gait and stability. Monitor for mental status changes and reorient to person, place, and time. Review medications for side effects contributing to fall risk. Reinforce activity limits and safety measures with patient and family. Provide visual clues: red socks.

## 2019-12-14 NOTE — H&P ADULT - HISTORY OF PRESENT ILLNESS
84M PMH esophageal cancer s/p esophagectomy (2017),, CAD s/p stents, severe AS, HTN, HLD, COPD on home O2 presents for right hip pain after a fall yesterday.  patient staets he lost his balance at home.  Denies LOC or hitting head.  Patient has been unable to bear wear on Right lower extremity.  Patient recent had [facility] to evaluate his Aortic valve stenosis 12/9/19.      Denies chest pain, sob, nausea, vomiting, diarrhea.

## 2019-12-14 NOTE — ED PROVIDER NOTE - CARE PLAN
Principal Discharge DX:	Hip injury, right, initial encounter  Secondary Diagnosis:	Fall, initial encounter Principal Discharge DX:	Pubic ramus fracture, right, closed, initial encounter  Secondary Diagnosis:	Fall, initial encounter  Secondary Diagnosis:	Closed nondisplaced fracture of acetabulum, unspecified portion of acetabulum, unspecified laterality, initial encounter

## 2019-12-14 NOTE — ED PROVIDER NOTE - LOWER EXTREMITY EXAM, RIGHT
+ diffuse RLE edema, + old appearing ecchymosis to R groin/hip, ROM of knee/ankle/toes intact, sensation intact, DP pulse 2+ + diffuse RLE edema, + healing ecchymosis to R groin/hip, ROM of knee/ankle/toes intact, sensation intact, DP pulse 2+

## 2019-12-14 NOTE — H&P ADULT - ASSESSMENT
84M PMH esophageal cancer s/p esophagectomy (2017),, CAD s/p stents, severe AS, HTN, HLD, COPD on home O2 presents for right hip pain after a fall yesterday, found to have multiple pelvic fractures.    Inferior pubic rami fracture  Right acetabular fracture  - pain control  - ortho following but medical management - no surgical intervention  - PT consult  - will likely need ANA placement    CAD s/p stents  - cont plavix    BPH   - cont finasteride and flomax    Aortic valve stenosis, severe  - follow up outpatient with cardiology, had recent cath    Seizure disorder  - cont phenytoin   - check phenytoin level in AM    COPD on home O2  Chronic respiratory failure  - cont home O2    DVT PPx  - cont HSQ    CAPRINI SCORE [CLOT]    AGE RELATED RISK FACTORS                                                       MOBILITY RELATED FACTORS  [ ] Age 41-60 years                                            (1 Point)                  [X ] Bed rest                                                        (1 Point)  [ ] Age: 61-74 years                                           (2 Points)                 [ ] Plaster cast                                                   (2 Points)  [X ] Age= 75 years                                              (3 Points)                 [ ] Bed bound for more than 72 hours                 (2 Points)    DISEASE RELATED RISK FACTORS                                               GENDER SPECIFIC FACTORS  [ ] Edema in the lower extremities                       (1 Point)                  [ ] Pregnancy                                                     (1 Point)  [ ] Varicose veins                                               (1 Point)                  [ ] Post-partum < 6 weeks                                   (1 Point)             [ ] BMI > 25 Kg/m2                                            (1 Point)                  [ ] Hormonal therapy  or oral contraception          (1 Point)                 [ ] Sepsis (in the previous month)                        (1 Point)                  [ ] History of pregnancy complications                 (1 point)  [ ] Pneumonia or serious lung disease                                               [ ] Unexplained or recurrent                     (1 Point)           (in the previous month)                               (1 Point)  [ ] Abnormal pulmonary function test                     (1 Point)                 SURGERY RELATED RISK FACTORS  [ ] Acute myocardial infarction                              (1 Point)                 [ ]  Section                                             (1 Point)  [ ] Congestive heart failure (in the previous month)  (1 Point)               [ ] Minor surgery                                                  (1 Point)   [ ] Inflammatory bowel disease                             (1 Point)                 [ ] Arthroscopic surgery                                        (2 Points)  [ ] Central venous access                                      (2 Points)                [ ] General surgery lasting more than 45 minutes   (2 Points)       [ ] Stroke (in the previous month)                          (5 Points)               [ ] Elective arthroplasty                                         (5 Points)                                                                                                                                               HEMATOLOGY RELATED FACTORS                                                 TRAUMA RELATED RISK FACTORS  [ ] Prior episodes of VTE                                     (3 Points)                [ ] Fracture of the hip, pelvis, or leg                       (5 Points)  [ ] Positive family history for VTE                         (3 Points)                 [ ] Acute spinal cord injury (in the previous month)  (5 Points)  [ ] Prothrombin 86556 A                                     (3 Points)                 [ ] Paralysis  (less than 1 month)                             (5 Points)  [ ] Factor V Leiden                                             (3 Points)                  [ ] Multiple Trauma within 1 month                        (5 Points)  [ ] Lupus anticoagulants                                     (3 Points)                                                           [ ] Anticardiolipin antibodies                               (3 Points)                                                       [ ] High homocysteine in the blood                      (3 Points)                                             [ ] Other congenital or acquired thrombophilia      (3 Points)                                                [ ] Heparin induced thrombocytopenia                  (3 Points)                                          Total Score [     4    ]    Caprini Score 0 - 2:  Low Risk, No VTE Prophylaxis required for most patients, encourage ambulation  Caprini Score 3 - 6:  At Risk, pharmacologic VTE prophylaxis is indicated for most patients (in the absence of a contraindication)  Caprini Score Greater than or = 7:  High Risk, pharmacologic VTE prophylaxis is indicated for most patients (in the absence of a contraindication)

## 2019-12-14 NOTE — ED PROVIDER NOTE - SECONDARY DIAGNOSIS.
Fall, initial encounter Closed nondisplaced fracture of acetabulum, unspecified portion of acetabulum, unspecified laterality, initial encounter

## 2019-12-14 NOTE — ED ADULT NURSE NOTE - OBJECTIVE STATEMENT
Pt presents to ED with c/o right hip pain. Pt states this morning he went to sit in a chair at home and the chair rolled out from under him. S/p fall on buttocks. Pt states inability to ambulate. Pt s/p angiogram on Monday through right groin, ecchymosis right hip and groin. Right leg edema, pt states that leg always has edema but more since angiogram.

## 2019-12-14 NOTE — H&P ADULT - NSICDXPASTSURGICALHX_GEN_ALL_CORE_FT
PAST SURGICAL HISTORY:  Anastomotic leak following esophagectomy [place] [facility] esophagectomy    H/O heart artery stent June 30, 2017    History of hernia repair     History of tonsillectomy     History of total hip replacement left    Knee arthropathy left

## 2019-12-14 NOTE — ED PROVIDER NOTE - OBJECTIVE STATEMENT
85yo M with PMH CAD s/p stents on plavix, AS, HTN, HLD, COPD (PRN home O2), esophageal CA, recent cath 12/9 presenting with R hip pain s/p fall yesterday. Pt reports he lost balance on rolling desk chair at home, fell onto R hip/R side. No head injury, no LOC. Assisted up by wife, now unable to weight bear on RLE since fall. Last took tylenol last night. +bruising to R groin since cath. Denies any dizziness/CP/palpitations prior to fall or now. Denies any HA, neck pain, SOB, abd pain, n/v, numbness/tingling, back pain, any other pain/injury.

## 2019-12-14 NOTE — H&P ADULT - NSHPLABSRESULTS_GEN_ALL_CORE
LABS:                        9.4    8.29  )-----------( 118      ( 14 Dec 2019 11:50 )             29.6     12-14    139  |  105  |  16  ----------------------------<  84  4.7   |  30  |  0.78    Ca    8.7      14 Dec 2019 11:50    TPro  6.9  /  Alb  2.6<L>  /  TBili  0.4  /  DBili  x   /  AST  51<H>  /  ALT  55<H>  /  AlkPhos  197<H>  12-14    PT/INR - ( 14 Dec 2019 11:50 )   PT: 12.3 sec;   INR: 1.10 ratio      PTT - ( 14 Dec 2019 11:50 )  PTT:27.2 sec    CAPILLARY BLOOD GLUCOSE ] YES  [ ] NO

## 2019-12-14 NOTE — ED ADULT NURSE REASSESSMENT NOTE - NS ED NURSE REASSESS COMMENT FT1
Pt sleeping comfortably. Family at bedside. Awaiting inpatient bed assignment. Will continue to monitor.

## 2019-12-14 NOTE — ED PROVIDER NOTE - ATTENDING CONTRIBUTION TO CARE
Dr. Pearson: I performed a face to face bedside interview with patient regarding history of present illness, review of symptoms and past medical history. I completed an independent physical exam.  I have discussed patient's plan of care with PA.   I agree with note as stated above, having amended the EMR as needed to reflect my findings.   This includes HISTORY OF PRESENT ILLNESS, HIV, PAST MEDICAL/SURGICAL/FAMILY/SOCIAL HISTORY, ALLERGIES AND HOME MEDICATIONS, REVIEW OF SYSTEMS, PHYSICAL EXAM, and any PROGRESS NOTES during the time I functioned as the attending physician for this patient.    see mdm

## 2019-12-14 NOTE — H&P ADULT - NSICDXPASTMEDICALHX_GEN_ALL_CORE_FT
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 Lavelle Reid    Esophagus cancer     Former smoker, stopped smoking in distant past     Herpes zoster without complication     HTN (hypertension)     Hyperlipidemia     Hypothyroidism, unspecified type     Inguinal hernia     OA (osteoarthritis) of knee right    SOFIA (obstructive sleep apnea)     Pulmonary nodule     Rheumatoid arthritis     Seizure disorder

## 2019-12-14 NOTE — PATIENT PROFILE ADULT - NSPROHMSYMPCOND_GEN_A_NUR
prn home oxygen, cpap nebs, advanced copd, anxiety, HTN, bph. CAD, Esophagus cancer, Osteoarthritis knee, asthma, allergic rhinitis, CVA, aortic valve stenosis, seizure disorder, herpes zoster without complication, abnormal LFTs, inguinal hernia.

## 2019-12-14 NOTE — ED PROVIDER NOTE - PROGRESS NOTE DETAILS
Dr. Pearson: D/w radiology, Dr. Bettencourt - right superior and inferior pubic ramus fx, acetabular fx. d/w ortho pt ok to eat. - Antonia Delacruz PA-C

## 2019-12-14 NOTE — ED ADULT NURSE NOTE - PMH
Abnormal LFTs    Advanced COPD    Anemia, unspecified type    Anxiety    Anxiety    Aortic valve stenosis, etiology of cardiac valve disease unspecified    Asthma    BCC (basal cell carcinoma)  skin  BPH (benign prostatic hyperplasia)    Cerebrovascular accident (CVA)    Cerebrovascular accident (CVA) due to embolism of cerebral artery    Chronic allergic rhinitis    Coronary artery disease  s/p 3 stents on June 30, 2017 at [place] Dr. Lavelle Reid  Esophagus cancer    Former smoker, stopped smoking in distant past    Herpes zoster without complication    HTN (hypertension)    Hyperlipidemia    Hypothyroidism, unspecified type    Inguinal hernia    OA (osteoarthritis) of knee  right  SOFIA (obstructive sleep apnea)    Pulmonary nodule    Rheumatoid arthritis    Seizure disorder

## 2019-12-14 NOTE — ED PROVIDER NOTE - PMH
Abnormal LFTs    Advanced COPD    Anemia, unspecified type    Anxiety    Anxiety    Aortic valve stenosis, etiology of cardiac valve disease unspecified    Asthma    BCC (basal cell carcinoma)  skin  BPH (benign prostatic hyperplasia)    Cerebrovascular accident (CVA)    Cerebrovascular accident (CVA) due to embolism of cerebral artery    Chronic allergic rhinitis    Coronary artery disease  s/p 3 stents on June 30, 2017 at [place] Dr. Lavelle Reid  Esophagus cancer    Former smoker, stopped smoking in distant past    Herpes zoster without complication    HTN (hypertension)    Hyperlipidemia    Hypothyroidism, unspecified type    Inguinal hernia    OA (osteoarthritis) of knee  right  SOFIA (obstructive sleep apnea)    Pulmonary nodule    Rheumatoid arthritis    Seizure disorder Abnormal LFTs    Advanced COPD    Anemia, unspecified type    Anxiety    Anxiety    Aortic valve stenosis, etiology of cardiac valve disease unspecified    Asthma    BCC (basal cell carcinoma)  skin  BPH (benign prostatic hyperplasia)    Cerebrovascular accident (CVA)    Cerebrovascular accident (CVA) due to embolism of cerebral artery    Chronic allergic rhinitis    Coronary artery disease  s/p 3 stents on June 30, 2017 at [place] Dr. Lavelle Reid  Esophagus cancer    Former smoker, stopped smoking in distant past    Herpes zoster without complication    HTN (hypertension)    Hyperlipidemia    Hypothyroidism, unspecified type    Inguinal hernia    OA (osteoarthritis) of knee  right  SOFIA (obstructive sleep apnea)    Pulmonary nodule    Rheumatoid arthritis    Seizure disorder

## 2019-12-14 NOTE — ED ADULT TRIAGE NOTE - NS ED NURSE BANDS TYPE
Name band; attending Norma: Pt contacted PMD who will provide clearance for dental procedure. Wishes to follow-up with private dentist. Will dc with close outpatient follow-up and strict return precautions.

## 2019-12-14 NOTE — ED PROVIDER NOTE - CLINICAL SUMMARY MEDICAL DECISION MAKING FREE TEXT BOX
Dr. Pearson: 84M h/o CAD with stents on plavix, AS, HTN, HLD, COPD on 3L home O2 at times, esophageal ca, recent cath via right femoral 5 days ago p/w mechanical fall off chair yesterday, landed on right hip. Unable to ambulate since. Did not hit head or LOC. No chest pain or sob. Did not want to get checked out yesterday. Usually ambulates with walker. On exam pt is frail appearing, nad, rrr, ctab, abdo soft/nt/nd, no rib ttp, pelvis stable but +pain with ROM of right hip, +healing ecchymosis over right hip from cath site. Will pain ctrl, get xray/ct r/o hip fx vs RP bleed, may need admission if pt unable to ambulate.

## 2019-12-14 NOTE — ED ADULT TRIAGE NOTE - NS ED TRIAGE AVPU SCALE
1320- Report received from MK Posadas.  Assumed care of infant.  1530- Infant's vital signs WDL.  1543- Infant observed at breast.  Latch score:  L2, A0, T2, C2, H2 = 8.   Alert-The patient is alert, awake and responds to voice. The patient is oriented to time, place, and person. The triage nurse is able to obtain subjective information.

## 2019-12-15 NOTE — PROGRESS NOTE ADULT - ATTENDING COMMENTS
I personally saw the patient at the bedside and agree with the findings above  Right leg with no deformity  pain with ROM of right hip  X-rays and CT scan reviewed which show non displaced right acetabular and inferior pubic ramus fracture as above.

## 2019-12-15 NOTE — PROGRESS NOTE ADULT - SUBJECTIVE AND OBJECTIVE BOX
ORtho PA Note  Chart reviewed, Pt examined      Subjective:   83 Y/O M admitted with Right Superior Pubic Rami Fx with extemsion into lower acetabulum and LEft inferior pubic Rami Fx.  Pt walker dependent at home with 5D/week HHA in place  Pt without complaints. No SOB,CP, N/V. Tolerated Fluids / Diet well.   Pain comfortable (3/10 ) on Interval Rx  Pain Rx.  ALPRAZolam 0.25 milliGRAM(s) Oral three times a day PRN  escitalopram 5 milliGRAM(s) Oral daily  oxycodone    5 mG/acetaminophen 325 mG 1 Tablet(s) Oral every 4 hours PRN  phenytoin   Capsule 100 milliGRAM(s) Oral two times a day  phenytoin   Chewable 50 milliGRAM(s) Chew daily    O: General: Pt Alert and oriented, On exam NAD,   VS: Vital Signs Last 24 Hrs  T(C): 36.3 (15 Dec 2019 05:28), Max: 36.9 (14 Dec 2019 16:30)  T(F): 97.4 (15 Dec 2019 05:28), Max: 98.4 (14 Dec 2019 16:30)  HR: 80 (15 Dec 2019 05:28) (80 - 88)  BP: 120/69 (15 Dec 2019 05:28) (120/69 - 139/73)  RR: 16 (15 Dec 2019 05:28) (16 - 16)  SpO2: 97% (15 Dec 2019 05:28) (90% - 100%)    [Abdomen]: Soft; no distention, benign exam    Ext( Right/Left  Hip/thigh): No STS or tenderness ib Gr Torch or thigh.  Able to actively flex both hips to 50 deg in bed, left less painful with AROM.  Passive hip flexion 60 deg bilat; Hip extension St = 3-4/5 bilat.  Neurologic: Has sensation bilat. feet & toes ;  Full AROM bilat feet & toes. EHL / AT  = Bilat: 5/5   Vascular: Feet toes warm, pink. DP = 2+. Calves soft ; w/o tenderness bilat..    VTEP: On Bilat. Venodynes + clopidogrel Tablet 75 milliGRAM(s) Oral daily  heparin  Injectable 5000 Unit(s) SubCutaneous every 12 hours     Labs Today:  CBC:                      8.0    5.98  )-----------( 126      ( 15 Dec 2019 06:10 )             26.3     12-15  Chem:  141  |  105  |  16  ----------------------------<  55<L>  4.3   |  32<H>  |  0.94    Hospitalist input noted    Primary Orthopedic Assessment:  • Stable from Orthopedic perspective; Non operative Mgt of Pubic Rami Fx.  • Neuro motor exam stable  • Labs: CBC with Anemia      Plan:   • Advise weight bearing as tolerated on both LE with Assistive Device PRN, PT & OT consult  • Continue DVT prophylaxis as prescribed, including use of compression devices and ankle pumps  • Continue Pain Rx  • Plans per Medicine   • Discharge planning – anticipated discharge is:  need for Subacute rehabilitation   when medically stable & cleared by PT/OT  Discussed findings/plan with ORtho attending  Discussed findings/plan with patient's wife and daughter Chart reviewed, Pt examined      Subjective:   85 Y/O M admitted with Right Superior Pubic Rami Fx with extemsion into lower acetabulum and LEft inferior pubic Rami Fx.  Pt walker dependent at home with 5D/week HHA in place  Pt without complaints. No SOB,CP, N/V. Tolerated Fluids / Diet well.   Pain comfortable (3/10 ) on Interval Rx  Pain Rx.  ALPRAZolam 0.25 milliGRAM(s) Oral three times a day PRN  escitalopram 5 milliGRAM(s) Oral daily  oxycodone    5 mG/acetaminophen 325 mG 1 Tablet(s) Oral every 4 hours PRN  phenytoin   Capsule 100 milliGRAM(s) Oral two times a day  phenytoin   Chewable 50 milliGRAM(s) Chew daily    O: General: Pt Alert and oriented, On exam NAD,   VS: Vital Signs Last 24 Hrs  T(C): 36.3 (15 Dec 2019 05:28), Max: 36.9 (14 Dec 2019 16:30)  T(F): 97.4 (15 Dec 2019 05:28), Max: 98.4 (14 Dec 2019 16:30)  HR: 80 (15 Dec 2019 05:28) (80 - 88)  BP: 120/69 (15 Dec 2019 05:28) (120/69 - 139/73)  RR: 16 (15 Dec 2019 05:28) (16 - 16)  SpO2: 97% (15 Dec 2019 05:28) (90% - 100%)    [Abdomen]: Soft; no distention, benign exam    Ext( Right/Left  Hip/thigh): No STS or tenderness ib Gr Torch or thigh.  Able to actively flex both hips to 50 deg in bed, left less painful with AROM.  Passive hip flexion 60 deg bilat; Hip extension St = 3-4/5 bilat.  Neurologic: Has sensation bilat. feet & toes ;  Full AROM bilat feet & toes. EHL / AT  = Bilat: 5/5   Vascular: Feet toes warm, pink. DP = 2+. Calves soft ; w/o tenderness bilat..    VTEP: On Bilat. Venodynes + clopidogrel Tablet 75 milliGRAM(s) Oral daily  heparin  Injectable 5000 Unit(s) SubCutaneous every 12 hours     Labs Today:  CBC:                      8.0    5.98  )-----------( 126      ( 15 Dec 2019 06:10 )             26.3     12-15  Chem:  141  |  105  |  16  ----------------------------<  55<L>  4.3   |  32<H>  |  0.94    Hospitalist input noted    Primary Orthopedic Assessment:  • Stable from Orthopedic perspective; Non operative Mgt of Pubic Rami Fx.  • Neuro motor exam stable  • Labs: CBC with Anemia      Plan:   • Advise weight bearing as tolerated on both LE with Assistive Device PRN, PT & OT consult  • Continue DVT prophylaxis as prescribed, including use of compression devices and ankle pumps  • Continue Pain Rx  • Plans per Medicine   • Discharge planning – anticipated discharge is:  need for Subacute rehabilitation   when medically stable & cleared by PT/OT  Discussed findings/plan with ORtho attending  Discussed findings/plan with patient's wife and daughter

## 2019-12-15 NOTE — PROGRESS NOTE ADULT - PROBLEM SELECTOR PROBLEM 1
Closed nondisplaced fracture of acetabulum, unspecified portion of acetabulum, unspecified laterality, initial encounter

## 2019-12-15 NOTE — PROGRESS NOTE ADULT - SUBJECTIVE AND OBJECTIVE BOX
Patient is a 84y old  Male who presents with a chief complaint of fall (14 Dec 2019 15:25)    Patient seen and examined at bedside.      ALLERGIES:  penicillin (Rash)    MEDICATIONS  (STANDING):  atorvastatin 20 milliGRAM(s) Oral at bedtime  clopidogrel Tablet 75 milliGRAM(s) Oral daily  escitalopram 5 milliGRAM(s) Oral daily  finasteride 5 milliGRAM(s) Oral daily  heparin  Injectable 5000 Unit(s) SubCutaneous every 12 hours  levothyroxine 50 MICROGram(s) Oral daily  montelukast 10 milliGRAM(s) Oral daily  pantoprazole    Tablet 40 milliGRAM(s) Oral before breakfast  phenytoin   Capsule 100 milliGRAM(s) Oral two times a day  phenytoin   Chewable 50 milliGRAM(s) Chew daily  predniSONE   Tablet 5 milliGRAM(s) Oral daily  tamsulosin 0.4 milliGRAM(s) Oral at bedtime    MEDICATIONS  (PRN):  albuterol/ipratropium for Nebulization. 3 milliLiter(s) Nebulizer every 6 hours PRN Shortness of Breath and/or Wheezing  ALPRAZolam 0.25 milliGRAM(s) Oral three times a day PRN anxiety  oxycodone    5 mG/acetaminophen 325 mG 1 Tablet(s) Oral every 4 hours PRN Moderate Pain (4 - 6)    Vital Signs Last 24 Hrs  T(F): 97.4 (15 Dec 2019 05:28), Max: 98.4 (14 Dec 2019 16:30)  HR: 80 (15 Dec 2019 05:28) (80 - 88)  BP: 120/69 (15 Dec 2019 05:28) (120/69 - 149/67)  RR: 16 (15 Dec 2019 05:28) (16 - 18)  SpO2: 97% (15 Dec 2019 05:28) (90% - 100%)  I&O's Summary    14 Dec 2019 07:01  -  15 Dec 2019 07:00  --------------------------------------------------------  IN: 0 mL / OUT: 201 mL / NET: -201 mL      PHYSICAL EXAM:  General: NAD, Alert; elderly  ENT: MMM, no thrush  Neck: Supple, No JVD  Lungs: Clear to auscultation bilaterally, good air entry, non-labored breathing  Cardio: +S1/S2  Abdomen: Soft, Nontender, Nondistended; Bowel sounds present  Extremities: No calf tenderness, No pitting edema; 1/5 rle and 4/5 strength lle    LABS:                        8.0    5.98  )-----------( 126      ( 15 Dec 2019 06:10 )             26.3     12-15    141  |  105  |  16  ----------------------------<  55  4.3   |  32  |  0.94    Ca    8.7      15 Dec 2019 06:10    TPro  6.9  /  Alb  2.6  /  TBili  0.4  /  DBili  x   /  AST  51  /  ALT  55  /  AlkPhos  197  12-14    eGFR if Non African American: 75 mL/min/1.73M2 (12-15-19 @ 06:10)  eGFR if African American: 86 mL/min/1.73M2 (12-15-19 @ 06:10)    PT/INR - ( 14 Dec 2019 11:50 )   PT: 12.3 sec;   INR: 1.10 ratio    PTT - ( 14 Dec 2019 11:50 )  PTT:27.2 sec    RADIOLOGY & ADDITIONAL TESTS:  < from: Xray Chest 1 View AP/PA (12.14.19 @ 12:50) >  IMPRESSION: Chest: Chronic right lung base airspace opacity, likely   representing fibrosis or scarring..  Right hip: No fracture identified. Please see results of CT pelvis   performed on the same day.  < end of copied text >    < from: CT 3D Reconstruct w/o Workstation (12.14.19 @ 11:50) >  IMPRESSION:  Fractures of the right acetabulum and inferior pubic ramus.  Small foci of acute hemorrhage in the anterior right groin, in a right   groin hernia containing small bowel and adjacent to the urinary bladder.  < end of copied text >    Care Discussed with Consultants/Other Providers:   Ortho PA

## 2019-12-15 NOTE — PROGRESS NOTE ADULT - ASSESSMENT
84M PMH esophageal cancer s/p esophagectomy (2017),, CAD s/p stents, severe AS, HTN, HLD, COPD on home O2 presents for right hip pain after a fall yesterday, found to have multiple pelvic fractures.    #Inferior pubic rami fracture w/ Right acetabular fracture  - pain control  - ortho consult placed via service (as patient and family would like to speak to ortho) - per previous note (h&p) no surgical intervention  - PT consult   - ANA placement pending     #CAD s/p stents  - plavix    #BPH   - stable  - finasteride and flomax    # Aortic valve stenosis, severe  - follow up outpatient with cardiology, had recent cath- per patient and daughter bedside negative     #Seizure disorder  - stable  - phenytoin   - level pending    #COPD on home O2 w/ Chronic respiratory failure  - cont O2    #DVT Prophylaxis  - heparin SC 84M PMH esophageal cancer s/p esophagectomy (2017),, CAD s/p stents, severe AS, HTN, HLD, COPD on home O2 presents for right hip pain after a fall yesterday, found to have multiple pelvic fractures.    #Inferior pubic rami fracture w/ Right acetabular fracture  - pain control  - ortho consult placed via service (as patient and family would like to speak to ortho) - per previous note (h&p) no surgical intervention  - PT consult   - ANA placement pending     #CAD s/p stents  - plavix    #BPH   - stable  - finasteride and flomax    # Aortic valve stenosis, severe  - follow up outpatient with cardiology, had recent cath- per patient and daughter bedside negative     #Seizure disorder  - stable  - phenytoin   - level pending    #COPD on home O2 w/ Chronic respiratory failure  - cont O2    #anxiety  - istop [id number]  - stable   - xanax    #DVT Prophylaxis  - heparin SC

## 2019-12-16 PROBLEM — K40.90 UNILATERAL INGUINAL HERNIA, WITHOUT OBSTRUCTION OR GANGRENE, NOT SPECIFIED AS RECURRENT: Chronic | Status: ACTIVE | Noted: 2019-01-01

## 2019-12-16 NOTE — PHYSICAL THERAPY INITIAL EVALUATION ADULT - ADDITIONAL COMMENTS
Pt lives in an apartment with wife, elevator to enter.  Pt was able to ambulate with RW and occasional assist, has transport chair and home O2.  Pt has aide 10-4 daily to assist with ADLs.

## 2019-12-16 NOTE — PROGRESS NOTE ADULT - ASSESSMENT
84M PMH esophageal cancer s/p esophagectomy (2017),, CAD s/p stents, severe AS, HTN, HLD, COPD on home O2 presents for right hip pain after a fall yesterday, found to have multiple pelvic fractures.    #Inferior pubic rami fracture w/ Right acetabular fracture  - pain control  - ortho consult: no surgical intervention  - PT consult   - ANA placement pending     # Drop in H/H possibly 2/2 pseudoaneurysm  - Pt had recent Angiogram one week ago ~2/9  - R arterial doppler: moderate pseudoaneurysm  - CT pelvis: small foci of active hemorrhage in right groin, in right groin hernia containing small bowel  - no signs of active GI bleeds  - Follow up Vascular surgery  - transfuse if Hb < 7  - pt is currently asymptomatic   - pt on iron supplement home  - may have vit B 12 def from phenytoin. pending phenytoin level    # thrombocytopenia   - monitor cbc    #CAD s/p stents  - plavix    #BPH   - stable  - finasteride and flomax    # Aortic valve stenosis, severe  - follow up outpatient with cardiology, had recent angiogram per pt.    #Seizure disorder  - stable  - phenytoin   - level pending    #COPD on home O2 w/ Chronic respiratory failure  - cont O2    #anxiety  - istop [id number]  - stable   - xanax    #DVT Prophylaxis  - SCD  - hold pharm DVT ppx due to drop in H/H

## 2019-12-16 NOTE — PROGRESS NOTE ADULT - SUBJECTIVE AND OBJECTIVE BOX
Patient is a 84y old  Male who presents with a chief complaint of Fall / bilateral Pubic Rami Fx (15 Dec 2019 12:11)      Patient seen and examined at bedside. No overnight events reported. Pt has no complaints. Pt reports no pain at rest. Pt denies sob, cp, dizziness.     ALLERGIES:  penicillin (Rash)    MEDICATIONS  (STANDING):  atorvastatin 20 milliGRAM(s) Oral at bedtime  clopidogrel Tablet 75 milliGRAM(s) Oral daily  escitalopram 5 milliGRAM(s) Oral daily  ferrous    sulfate 325 milliGRAM(s) Oral daily  finasteride 5 milliGRAM(s) Oral daily  levothyroxine 50 MICROGram(s) Oral daily  montelukast 10 milliGRAM(s) Oral daily  pantoprazole    Tablet 40 milliGRAM(s) Oral before breakfast  phenytoin   Capsule 100 milliGRAM(s) Oral two times a day  phenytoin   Chewable 50 milliGRAM(s) Chew daily  predniSONE   Tablet 5 milliGRAM(s) Oral daily  senna 1 Tablet(s) Oral at bedtime  sodium chloride 0.65% Nasal 1 Spray(s) Both Nostrils two times a day  tamsulosin 0.4 milliGRAM(s) Oral at bedtime    MEDICATIONS  (PRN):  acetaminophen   Tablet .. 650 milliGRAM(s) Oral every 6 hours PRN Mild Pain (1 - 3), Moderate Pain (4 - 6)  albuterol/ipratropium for Nebulization. 3 milliLiter(s) Nebulizer every 6 hours PRN Shortness of Breath and/or Wheezing  ALPRAZolam 0.25 milliGRAM(s) Oral three times a day PRN anxiety  oxyCODONE    IR 5 milliGRAM(s) Oral every 6 hours PRN Severe Pain (7 - 10)    Vital Signs Last 24 Hrs  T(F): 97.3 (16 Dec 2019 06:17), Max: 98 (15 Dec 2019 22:37)  HR: 76 (16 Dec 2019 06:17) (76 - 82)  BP: 104/52 (16 Dec 2019 06:17) (104/52 - 124/80)  RR: 18 (16 Dec 2019 06:17) (16 - 18)  SpO2: 95% (16 Dec 2019 06:17) (95% - 96%)  I&O's Summary    15 Dec 2019 07:01  -  16 Dec 2019 07:00  --------------------------------------------------------  IN: 0 mL / OUT: 300 mL / NET: -300 mL    16 Dec 2019 07:01  -  16 Dec 2019 13:53  --------------------------------------------------------  IN: 480 mL / OUT: 200 mL / NET: 280 mL      GENERAL: NAD  HEAD:  Atraumatic, Normocephalic  EYES: EOMI, PERRLA, sclera clear  NECK: Supple  CHEST/LUNG: Clear to auscultation bilaterally; No wheeze  HEART: Regular rate and rhythm; No murmurs  ABDOMEN: Soft, Nontender, Nondistended; Bowel sounds present  EXTREMITIES: no edema. + right posterior thigh ecchymosis   NEUROLOGY: non-focal      LABS:                        7.5    4.72  )-----------( 103      ( 16 Dec 2019 06:56 )             24.6     12-16    144  |  106  |  20  ----------------------------<  96  4.4   |  33  |  0.88    Ca    8.8      16 Dec 2019 06:56    TPro  6.9  /  Alb  2.6  /  TBili  0.4  /  DBili  x   /  AST  51  /  ALT  55  /  AlkPhos  197  12-14      eGFR if Non African American: 79 mL/min/1.73M2 (12-16-19 @ 06:56)  eGFR if : 92 mL/min/1.73M2 (12-16-19 @ 06:56)    PT/INR - ( 14 Dec 2019 11:50 )   PT: 12.3 sec;   INR: 1.10 ratio         PTT - ( 14 Dec 2019 11:50 )  PTT:27.2 sec      RADIOLOGY & ADDITIONAL TESTS:    Care Discussed with Consultants/Other Providers:

## 2019-12-17 NOTE — PROGRESS NOTE ADULT - SUBJECTIVE AND OBJECTIVE BOX
Patient is a 84y old  Male who presents with a chief complaint of fall (17 Dec 2019 08:33)      Patient seen and examined at bedside. No overnight events reported. Pt reported one episode of CP this morning that went away now. Pt reports having phlegm stuck in his chest.       ALLERGIES:  penicillin (Rash)    MEDICATIONS  (STANDING):  atorvastatin 20 milliGRAM(s) Oral at bedtime  clopidogrel Tablet 75 milliGRAM(s) Oral daily  escitalopram 5 milliGRAM(s) Oral daily  ferrous    sulfate 325 milliGRAM(s) Oral daily  finasteride 5 milliGRAM(s) Oral daily  levothyroxine 50 MICROGram(s) Oral daily  montelukast 10 milliGRAM(s) Oral daily  pantoprazole    Tablet 40 milliGRAM(s) Oral before breakfast  phenytoin   Capsule 100 milliGRAM(s) Oral two times a day  phenytoin   Chewable 50 milliGRAM(s) Chew daily  predniSONE   Tablet 5 milliGRAM(s) Oral daily  senna 1 Tablet(s) Oral at bedtime  sodium chloride 0.65% Nasal 1 Spray(s) Both Nostrils two times a day  tamsulosin 0.4 milliGRAM(s) Oral at bedtime    MEDICATIONS  (PRN):  acetaminophen   Tablet .. 650 milliGRAM(s) Oral every 6 hours PRN Mild Pain (1 - 3), Moderate Pain (4 - 6)  albuterol/ipratropium for Nebulization. 3 milliLiter(s) Nebulizer every 6 hours PRN Shortness of Breath and/or Wheezing  ALPRAZolam 0.25 milliGRAM(s) Oral three times a day PRN anxiety  oxyCODONE    IR 5 milliGRAM(s) Oral every 6 hours PRN Severe Pain (7 - 10)    Vital Signs Last 24 Hrs  T(F): 97.2 (17 Dec 2019 08:16), Max: 97.6 (16 Dec 2019 20:57)  HR: 68 (17 Dec 2019 08:16) (64 - 77)  BP: 104/60 (17 Dec 2019 08:16) (85/50 - 113/51)  RR: 20 (17 Dec 2019 08:16) (14 - 20)  SpO2: 100% (17 Dec 2019 08:16) (98% - 100%)  I&O's Summary    16 Dec 2019 07:01  -  17 Dec 2019 07:00  --------------------------------------------------------  IN: 960 mL / OUT: 550 mL / NET: 410 mL    GENERAL: NAD  HEAD:  Atraumatic, Normocephalic  EYES: EOMI, PERRLA, sclera clear  NECK: Supple  CHEST/LUNG: Clear to auscultation bilaterally; No wheeze  HEART: Regular rate and rhythm; No murmurs, rubs, or gallops  ABDOMEN: Soft, Nontender, Nondistended; Bowel sounds present  EXTREMITIES:  2+ Peripheral Pulses, No clubbing, cyanosis, or edema  PSYCH: AAOx3  NEUROLOGY: non-focal  SKIN: No rashes or lesions    LABS:                        8.4    4.93  )-----------( 107      ( 17 Dec 2019 06:00 )             27.5     12-16    144  |  106  |  20  ----------------------------<  96  4.4   |  33  |  0.88    Ca    8.8      16 Dec 2019 06:56    TPro  6.9  /  Alb  2.6  /  TBili  0.4  /  DBili  x   /  AST  51  /  ALT  55  /  AlkPhos  197  12-14      eGFR if Non African American: 79 mL/min/1.73M2 (12-16-19 @ 06:56)  eGFR if : 92 mL/min/1.73M2 (12-16-19 @ 06:56)    PT/INR - ( 14 Dec 2019 11:50 )   PT: 12.3 sec;   INR: 1.10 ratio         PTT - ( 14 Dec 2019 11:50 )  PTT:27.2 sec      12-16 [id number] 5.0      RADIOLOGY & ADDITIONAL TESTS:    Care Discussed with Consultants/Other Providers: Patient is a 84y old  Male who presents with a chief complaint of fall (17 Dec 2019 08:33)      Patient seen and examined at bedside. No overnight events reported. Pt reported one episode of CP this morning that went away now. Pt attributes to having phlegm stuck in his chest and reports that CP went away as soon as the phlegm was cleared.      ALLERGIES:  penicillin (Rash)    MEDICATIONS  (STANDING):  atorvastatin 20 milliGRAM(s) Oral at bedtime  clopidogrel Tablet 75 milliGRAM(s) Oral daily  escitalopram 5 milliGRAM(s) Oral daily  ferrous    sulfate 325 milliGRAM(s) Oral daily  finasteride 5 milliGRAM(s) Oral daily  levothyroxine 50 MICROGram(s) Oral daily  montelukast 10 milliGRAM(s) Oral daily  pantoprazole    Tablet 40 milliGRAM(s) Oral before breakfast  phenytoin   Capsule 100 milliGRAM(s) Oral two times a day  phenytoin   Chewable 50 milliGRAM(s) Chew daily  predniSONE   Tablet 5 milliGRAM(s) Oral daily  senna 1 Tablet(s) Oral at bedtime  sodium chloride 0.65% Nasal 1 Spray(s) Both Nostrils two times a day  tamsulosin 0.4 milliGRAM(s) Oral at bedtime    MEDICATIONS  (PRN):  acetaminophen   Tablet .. 650 milliGRAM(s) Oral every 6 hours PRN Mild Pain (1 - 3), Moderate Pain (4 - 6)  albuterol/ipratropium for Nebulization. 3 milliLiter(s) Nebulizer every 6 hours PRN Shortness of Breath and/or Wheezing  ALPRAZolam 0.25 milliGRAM(s) Oral three times a day PRN anxiety  oxyCODONE    IR 5 milliGRAM(s) Oral every 6 hours PRN Severe Pain (7 - 10)    Vital Signs Last 24 Hrs  T(F): 97.2 (17 Dec 2019 08:16), Max: 97.6 (16 Dec 2019 20:57)  HR: 68 (17 Dec 2019 08:16) (64 - 77)  BP: 104/60 (17 Dec 2019 08:16) (85/50 - 113/51)  RR: 20 (17 Dec 2019 08:16) (14 - 20)  SpO2: 100% (17 Dec 2019 08:16) (98% - 100%)  I&O's Summary    16 Dec 2019 07:01  -  17 Dec 2019 07:00  --------------------------------------------------------  IN: 960 mL / OUT: 550 mL / NET: 410 mL    GENERAL: NAD  HEAD:  Atraumatic, Normocephalic  EYES: EOMI, PERRLA, sclera clear  NECK: Supple  CHEST/LUNG: Clear to auscultation bilaterally; No wheeze  HEART: Regular rate and rhythm; No murmurs, rubs, or gallops  ABDOMEN: Soft, Nontender, Nondistended; Bowel sounds present  EXTREMITIES:  2+ Peripheral Pulses, No clubbing, cyanosis, or edema  PSYCH: AAOx3  NEUROLOGY: non-focal  SKIN: No rashes or lesions    LABS:                        8.4    4.93  )-----------( 107      ( 17 Dec 2019 06:00 )             27.5     12-16    144  |  106  |  20  ----------------------------<  96  4.4   |  33  |  0.88    Ca    8.8      16 Dec 2019 06:56    TPro  6.9  /  Alb  2.6  /  TBili  0.4  /  DBili  x   /  AST  51  /  ALT  55  /  AlkPhos  197  12-14      eGFR if Non African American: 79 mL/min/1.73M2 (12-16-19 @ 06:56)  eGFR if : 92 mL/min/1.73M2 (12-16-19 @ 06:56)    PT/INR - ( 14 Dec 2019 11:50 )   PT: 12.3 sec;   INR: 1.10 ratio         PTT - ( 14 Dec 2019 11:50 )  PTT:27.2 sec      12-16 [id number] 5.0      RADIOLOGY & ADDITIONAL TESTS:    Care Discussed with Consultants/Other Providers:

## 2019-12-17 NOTE — H&P ADULT - NSICDXPASTMEDICALHX_GEN_ALL_CORE_FT
PAST MEDICAL HISTORY:  Abnormal LFTs     Advanced COPD     Anemia, unspecified type     Anxiety     Anxiety     Aortic valve stenosis, etiology of cardiac valve disease unspecified     Asthma     BCC (basal cell carcinoma) skin    BPH (benign prostatic hyperplasia)     Cerebrovascular accident (CVA)     Cerebrovascular accident (CVA) due to embolism of cerebral artery     Chronic allergic rhinitis     Coronary artery disease s/p 3 stents on June 30, 2017 at [place] Dr. Lavelle Reid    Esophagus cancer     Former smoker, stopped smoking in distant past     Herpes zoster without complication     HTN (hypertension)     Hyperlipidemia     Hypothyroidism, unspecified type     Inguinal hernia     OA (osteoarthritis) of knee right    SOFIA (obstructive sleep apnea)     Pulmonary nodule     Rheumatoid arthritis     Seizure disorder

## 2019-12-17 NOTE — PROGRESS NOTE ADULT - ASSESSMENT
84M PMH esophageal cancer s/p esophagectomy (2017),, CAD s/p stents, severe AS, HTN, HLD, COPD on home O2 presents for right hip pain after a fall yesterday, found to have multiple pelvic fractures.    #Inferior pubic rami fracture w/ Right acetabular fracture  - pain control  - ortho consult: no surgical intervention  - PT consult   - needs ANA    # large right femoral artery pseudoaneurysm  - s/p cardiac cath 12/9/19  - R arterial doppler: moderately large pseudoaneurysm  - Vascular surgery: pseudoaneurysm requires repair  - transfer to [facility] to be evaluated by vacular surgeon Dr. Cary.   - Dr. Mcdonald hospitalist accepted.     # Macrocytic anemia  - H/H stabilized   - cont iron supplement   - f/u folate level. B12 level: 1547    # mild thrombocytopenia   - monitor cbc  - platelet level 107    #CAD s/p stents  - plavix, lipitor    #BPH   - stable  - finasteride and flomax    # Aortic valve stenosis, severe  - follow up outpatient with cardiology, had recent cardiac cath    #Seizure disorder  - stable  - phenytoin   - level pending    #COPD on home O2 w/ Chronic respiratory failure  - cont O2    #anxiety  - istop [id number]  - stable   - xanax    #DVT Prophylaxis  - SCD  - hold pharm DVT ppx due to drop in H/H    # DISPO: pending transfer to [facility] for vascular surgery Dr. Cary evaluation 84M PMH esophageal cancer s/p esophagectomy (2017),, CAD s/p stents, severe AS, HTN, HLD, COPD on home O2 presents for right hip pain after a fall yesterday, found to have multiple pelvic fractures.    #Inferior pubic rami fracture w/ Right acetabular fracture  - pain control  - ortho consult: no surgical intervention  - PT consult   - needs ANA    # large right femoral artery pseudoaneurysm  - s/p cardiac cath 12/9/19  - R arterial doppler: moderately large pseudoaneurysm  - Vascular surgery: pseudoaneurysm requires repair  - transfer to [facility] to be evaluated by vacular surgeon Dr. Cary.   - Dr. Mcdonald hospitalist accepted.     # Macrocytic anemia  - H/H stabilized   - cont iron supplement   - f/u folate level. B12 level: 1547    # mild thrombocytopenia   - monitor cbc  - platelet level 107    #CAD s/p stents  - plavix, lipitor    #BPH   - stable  - finasteride and flomax    # Aortic valve stenosis, severe  - follow up outpatient with cardiology, had recent cardiac cath    #Seizure disorder  - stable  - c/w phenytoin   - level pending    #COPD on home O2 w/ Chronic respiratory failure  - cont O2  - nebs prn  - montelukast  - prednisone 5mg qd    # Hypothyroidism  - c/w synthroid     #anxiety  - istop [id number]  - stable   - xanax    #DVT Prophylaxis  - SCD  - hold pharm DVT ppx due to drop in H/H    # DISPO: pending transfer to [facility] for vascular surgery Dr. Cary evaluation 84M PMH esophageal cancer s/p esophagectomy (2017),, CAD s/p stents, severe AS, HTN, HLD, COPD on home O2 presents for right hip pain after a fall yesterday, found to have multiple pelvic fractures.    #Inferior pubic rami fracture w/ Right acetabular fracture  - pain control  - ortho consult: no surgical intervention  - PT consult   - needs ANA    # large right femoral artery pseudoaneurysm  - s/p cardiac cath 12/9/19  - R arterial doppler: moderately large pseudoaneurysm  - Vascular surgery: pseudoaneurysm requires repair  - transfer to [facility] to be evaluated by vacular surgeon Dr. Cary.   - Dr. Mcdonald hospitalist accepted.     # one episode of CP today  - pain resolved.   - EKG unchanged from prior.  - f/u troponin    # Macrocytic anemia  - H/H stabilized   - cont iron supplement   - f/u folate level.  - B12 level: 1547    # mild thrombocytopenia   - monitor cbc  - platelet level 107    #CAD s/p stents  - plavix, lipitor    #BPH   - stable  - finasteride and flomax    # Aortic valve stenosis, severe  - follow up outpatient with cardiology, had recent cardiac cath    #Seizure disorder  - stable  - c/w phenytoin   - level pending    #COPD on home O2 w/ Chronic respiratory failure  - cont O2  - nebs prn  - montelukast  - prednisone 5mg qd    # Hypothyroidism  - c/w synthroid     #anxiety  - istop [id number]  - stable   - xanax    #DVT Prophylaxis  - SCD  - hold pharm DVT ppx due to drop in H/H    # DISPO: pending transfer to [facility] for vascular surgery Dr. Cary evaluation 84M PMH esophageal cancer s/p esophagectomy (2017),, CAD s/p stents, severe AS, HTN, HLD, COPD on home O2 presents for right hip pain after a fall yesterday, found to have multiple pelvic fractures.    #Inferior pubic rami fracture w/ Right acetabular fracture  - pain control  - ortho consult: no surgical intervention  - PT consult   - needs ANA    # large right femoral artery pseudoaneurysm  - s/p cardiac cath 12/9/19  - R arterial doppler: moderately large pseudoaneurysm  - Vascular surgery: pseudoaneurysm requires repair  - transfer to [facility] to be evaluated by vacular surgeon Dr. Cary.   - Dr. Mcdonald hospitalist accepted.     # one episode of CP today  - pain resolved.   - EKG unchanged from prior.  - elevated troponin r/o NSTEMI  - spoke to cardiology: cont medical optimization.     # Macrocytic anemia  - H/H stabilized   - cont iron supplement   - f/u folate level.  - B12 level: 1547    # mild thrombocytopenia   - monitor cbc  - platelet level 107    #CAD s/p stents  - plavix, lipitor    #BPH   - stable  - finasteride and flomax    # Aortic valve stenosis, severe  - follow up outpatient with cardiology, had recent cardiac cath    #Seizure disorder  - stable  - c/w phenytoin   - level pending    #COPD on home O2 w/ Chronic respiratory failure  - cont O2  - nebs prn  - montelukast  - prednisone 5mg qd    # Hypothyroidism  - c/w synthroid     #anxiety  - istop [id number]  - stable   - xanax    #DVT Prophylaxis  - SCD  - hold pharm DVT ppx due to drop in H/H    # DISPO: pending transfer to [facility] for vascular surgery Dr. Cary evaluation 84M PMH esophageal cancer s/p esophagectomy (2017),, CAD s/p stents, severe AS, HTN, HLD, COPD on home O2 presents for right hip pain after a fall yesterday, found to have multiple pelvic fractures.    #Inferior pubic rami fracture w/ Right acetabular fracture  - pain control  - ortho consult: no surgical intervention  - PT consult   - needs ANA    # large right femoral artery pseudoaneurysm  - s/p cardiac cath 12/9/19  - R arterial doppler: moderately large pseudoaneurysm  - Vascular surgery: pseudoaneurysm requires repair  - transfer to [facility] to be evaluated by vacular surgeon Dr. Cary.   - Dr. Mcdonald hospitalist accepted.     # one episode of CP today  - pain resolved.   - EKG unchanged from prior.  - trend troponin. first set 0.69      # elevated troponin r/o NSTEMI  - spoke to cardiology: cont medical optimization.     # Macrocytic anemia  - H/H stabilized   - cont iron supplement   - f/u folate level.  - B12 level: 1547    # mild thrombocytopenia   - monitor cbc  - platelet level 107    #CAD s/p stents  - plavix, lipitor    #BPH   - stable  - finasteride and flomax    # Aortic valve stenosis, severe  - follow up outpatient with cardiology, had recent cardiac cath    #Seizure disorder  - stable  - c/w phenytoin   - level pending    #COPD on home O2 w/ Chronic respiratory failure  - cont O2  - nebs prn  - montelukast  - prednisone 5mg qd    # Hypothyroidism  - c/w synthroid     #anxiety  - istop [id number]  - stable   - xanax    #DVT Prophylaxis  - SCD  - hold pharm DVT ppx due to drop in H/H    # DISPO: pending transfer to [facility] for vascular surgery Dr. Cary evaluation 84M PMH esophageal cancer s/p esophagectomy (2017),, CAD s/p stents, severe AS, HTN, HLD, COPD on home O2 presents for right hip pain after a fall yesterday, found to have multiple pelvic fractures.    #Inferior pubic rami fracture w/ Right acetabular fracture  - pain control  - ortho consult: no surgical intervention  - PT consult   - needs ANA    # large right femoral artery pseudoaneurysm  - s/p cardiac cath 12/9/19  - R arterial doppler: moderately large pseudoaneurysm  - Vascular surgery: pseudoaneurysm requires repair  - transfer to [facility] to be evaluated by vacular surgeon Dr. Cary.   - Dr. Mcdonald hospitalist accepted.     # one episode of CP today  - pain resolved.   - EKG unchanged from prior.  - trend troponin. first set 0.69    # elevated troponin r/o NSTEMI  - spoke to cardiology: cont medical optimization.     # Macrocytic anemia  - H/H stabilized   - cont iron supplement   - f/u folate level.  - B12 level: 1547    # mild thrombocytopenia   - monitor cbc  - platelet level 107    #CAD s/p stents  - plavix, lipitor    #BPH   - stable  - finasteride and flomax    # Aortic valve stenosis, severe  - follow up outpatient with cardiology, had recent cardiac cath    #Seizure disorder  - stable  - c/w phenytoin   - level pending    #COPD on home O2 w/ Chronic respiratory failure  - cont O2  - nebs prn  - montelukast  - prednisone 5mg qd    # Hypothyroidism  - c/w synthroid     #anxiety  - istop [id number]  - stable   - xanax    #DVT Prophylaxis  - SCD  - hold pharm DVT ppx due to drop in H/H    # DISPO: pending transfer to [facility] for vascular surgery Dr. Cary evaluation 84M PMH esophageal cancer s/p esophagectomy (2017),, CAD s/p stents, severe AS, HTN, HLD, COPD on home O2 presents for right hip pain after a fall yesterday, found to have multiple pelvic fractures.    #Inferior pubic rami fracture w/ Right acetabular fracture  - pain control  - ortho consult: no surgical intervention  - PT consult   - needs ANA    # large right femoral artery pseudoaneurysm  - s/p cardiac cath 12/9/19  - R arterial doppler: moderately large pseudoaneurysm  - Vascular surgery: pseudoaneurysm requires repair  - transfer to [facility] to be evaluated by vacular surgeon Dr. Cary.   - Dr. Mcdonald hospitalist accepted.     # one episode of CP today  - pain resolved.   - EKG unchanged from prior.  - trend troponin. first set 0.69    # elevated troponin r/o NSTEMI  - spoke to cardiology: cont medical optimization.   - trend troponin  - cardiac cath: no significant restenosis. D1: severe atherosclerossi. CX: 40% stenosis. OM1: 40% stenosis. RCA: normal. Recommendation: continue present medications.     # Macrocytic anemia  - H/H stabilized   - cont iron supplement   - f/u folate level.  - B12 level: 1547    # mild thrombocytopenia   - monitor cbc  - platelet level 107    #CAD s/p stents  - plavix, lipitor    #BPH   - stable  - finasteride and flomax    # Aortic valve stenosis, severe  - follow up outpatient with cardiology, had recent cardiac cath    #Seizure disorder  - stable  - c/w phenytoin   - level pending    #COPD on home O2 w/ Chronic respiratory failure  - cont O2  - nebs prn  - montelukast  - prednisone 5mg qd    # Hypothyroidism  - c/w synthroid     #anxiety  - istop [id number]  - stable   - xanax    #DVT Prophylaxis  - SCD  - hold pharm DVT ppx due to drop in H/H    # DISPO: pending transfer to [facility] for vascular surgery Dr. Cary evaluation

## 2019-12-17 NOTE — H&P ADULT - HISTORY OF PRESENT ILLNESS
84M PMH esophageal cancer s/p esophagectomy (2017), CAD s/p stents, severe AS with recent cath, HTN, HLD, COPD on home O2 presents as a transfer from [place] for R femoral pseudoaneurysm. Patient had presented to [place] for right hip pain after a fall and found to have Inferior pubic rami fracture w/ Right acetabular fracture. Pt seen by Orthopedics and no surgical intervention was indicated. Pt also had recent cardiac cath on 12/9 and developed ecchymosis of R lateral hip and groin found to have large right femoral artery pseudoaneurysm.      VSS: T 97.4, HR 78, /58, RR 18 satting 94% on 3L nasal cannula. 84M PMH esophageal cancer s/p esophagectomy (2017), CAD s/p stents, severe AS with recent cath, HTN, HLD, COPD on home O2 as needed, RA, CVA in the past, presents as a transfer from [place] for R femoral pseudoaneurysm. Patient had presented to [place] for right hip pain after a fall. Fall was mechanical, patient slid from a rolling chair when trying to get his walker. He denied any lightheadedness or dizziness, no chest pain or difficulty breathing at that time. Patient was found to have Inferior pubic rami fracture w/ Right acetabular fracture. Pt seen by Orthopedics and no surgical intervention was indicated. Pt also had recent cardiac cath on 12/9 and developed ecchymosis of R lateral hip and groin, found to have large right femoral artery pseudoaneurysm.      VSS: T 97.4, HR 78, /58, RR 18 satting 94% on 3L nasal cannula.

## 2019-12-17 NOTE — H&P ADULT - PROBLEM SELECTOR PLAN 6
- patient with COPD on home O2 as needed  - continue montelukast, duonebs  - CPAP at night     #Seizure disorder  - c/w phenytoin

## 2019-12-17 NOTE — H&P ADULT - PROBLEM SELECTOR PLAN 5
- anemia with Hgb 8.4, stable (9.8 on 12/9), anemia of chronic disease  - no signs of acute blood loss  - continue Iron supplementation  - continue to trend Hgb      # mild thrombocytopenia   - monitor cbc  - no signs of active bleeding  - continue to trend    RA  - continue prednisone 5 daily

## 2019-12-17 NOTE — H&P ADULT - PROBLEM SELECTOR PLAN 4
- TTE on 11/14/19 consistent with severe aortic stenosis  - continue to monitor - TTE on 11/14/19 consistent with severe aortic stenosis  - continue to monitor  - f/u cards recs

## 2019-12-17 NOTE — PATIENT PROFILE ADULT - ABILITY TO HEAR (WITH HEARING AID OR HEARING APPLIANCE IF NORMALLY USED):
Mildly to Moderately Impaired: difficulty hearing in some environments or speaker may need to increase volume or speak distinctly/hearing aid left ear

## 2019-12-17 NOTE — DISCHARGE NOTE PROVIDER - NSDCCPCAREPLAN_GEN_ALL_CORE_FT
PRINCIPAL DISCHARGE DIAGNOSIS  Diagnosis: Pubic ramus fracture, right, closed, initial encounter  Assessment and Plan of Treatment:       SECONDARY DISCHARGE DIAGNOSES  Diagnosis: Closed nondisplaced fracture of acetabulum, unspecified portion of acetabulum, unspecified laterality, initial encounter  Assessment and Plan of Treatment:     Diagnosis: Fall, initial encounter  Assessment and Plan of Treatment:

## 2019-12-17 NOTE — DISCHARGE NOTE PROVIDER - NSDCFUSCHEDAPPT_GEN_ALL_CORE_FT
MEJIA LATIF ; 12/31/2019 ; NPP CT Surg [address] MJEIA Ferrera ; 01/06/2020 ; NPP Pulmmed [facility]

## 2019-12-17 NOTE — H&P ADULT - NSHPSOCIALHISTORY_GEN_ALL_CORE
Patient has a history of smoking, quit 40 years ago (smoked for 2 packs per day for 20 years). No alcohol use. Patient lives at home with wife, ambulatory with a walker.

## 2019-12-17 NOTE — H&P ADULT - NSICDXPASTSURGICALHX_GEN_ALL_CORE_FT
PAST SURGICAL HISTORY:  Anastomotic leak following esophagectomy [place] logan esophagectomy    H/O heart artery stent June 30, 2017    History of hernia repair     History of tonsillectomy     History of total hip replacement left    Knee arthropathy left

## 2019-12-17 NOTE — H&P ADULT - PROBLEM SELECTOR PLAN 3
- patient with CAD s/p stent 3 years ago, on plavix  - recent cardiac cath on 12/9, no intervention at that time. Cardiac cath showed no significant restenosis. D1: severe atherosclerossi. CX: 40% stenosis. OM1: 40% stenosis. RCA: normal.   - pt with an episode of chest pain resolved  - per OSH note, cardiology consulted for elevated troponin, continued on medical management  - monitor EKG, trend cardiac enzymes  - continue statin - patient with CAD s/p stent 3 years ago, on plavix  - recent cardiac cath on 12/9, no intervention at that time. Cardiac cath showed no significant restenosis. D1: severe atherosclerosis. CX: 40% stenosis. OM1: 40% stenosis. RCA: normal.   - pt with an episode of chest pain resolved  - cards consult for elevated troponin and cardiac clearance  - monitor EKG, trend cardiac enzymes  - continue plavix (okay with vascular team), statin

## 2019-12-17 NOTE — H&P ADULT - NSHPLABSRESULTS_GEN_ALL_CORE
CBC Full  -  ( 17 Dec 2019 06:00 )  WBC Count : 4.93 K/uL  Hemoglobin : 8.4 g/dL  Hematocrit : 27.5 %  Platelet Count - Automated : 107 K/uL  Mean Cell Volume : 106.2 fl  Mean Cell Hemoglobin : 32.4 pg  Mean Cell Hemoglobin Concentration : 30.5 gm/dL  Auto Neutrophil # : 3.71 K/uL  Auto Lymphocyte # : 0.46 K/uL  Auto Monocyte # : 0.51 K/uL  Auto Eosinophil # : 0.21 K/uL  Auto Basophil # : 0.01 K/uL  Auto Neutrophil % : 75.3 %  Auto Lymphocyte % : 9.3 %  Auto Monocyte % : 10.3 %  Auto Eosinophil % : 4.3 %  Auto Basophil % : 0.2 %                        8.4    4.93  )-----------( 107      ( 17 Dec 2019 06:00 )             27.5       12-16    144  |  106  |  20  ----------------------------<  96  4.4   |  33<H>  |  0.88    Ca    8.8      16 Dec 2019 06:56        CARDIAC MARKERS ( 17 Dec 2019 16:30 )  .936 ng/mL / x     / x     / x     / x      CARDIAC MARKERS ( 17 Dec 2019 13:40 )  .858 ng/mL / x     / x     / x     / x      CARDIAC MARKERS ( 17 Dec 2019 12:14 )  .697 ng/mL / x     / x     / x     / x        < from: US Duplex Arterial Lower Ext Ltd, Right (12.16.19 @ 13:06) >    History swelling after arteriogram    There is a pseudoaneurysm in the right inguinal region. It measures   approximately 6.2 cm in maximum craniocaudad length and 2 cm in thickness   and 2.5 cm in width. The vascularized lumen measures a maximum internal   length of 4.2 cm. There is mild eccentric mural thrombus centered in the   inferior aspect.. A discrete neck is identified.    IMPRESSION:  Moderately large right-sided pseudoaneurysm as above      < end of copied text > CBC Full  -  ( 17 Dec 2019 06:00 )  WBC Count : 4.93 K/uL  Hemoglobin : 8.4 g/dL  Hematocrit : 27.5 %  Platelet Count - Automated : 107 K/uL  Mean Cell Volume : 106.2 fl  Mean Cell Hemoglobin : 32.4 pg  Mean Cell Hemoglobin Concentration : 30.5 gm/dL  Auto Neutrophil # : 3.71 K/uL  Auto Lymphocyte # : 0.46 K/uL  Auto Monocyte # : 0.51 K/uL  Auto Eosinophil # : 0.21 K/uL  Auto Basophil # : 0.01 K/uL  Auto Neutrophil % : 75.3 %  Auto Lymphocyte % : 9.3 %  Auto Monocyte % : 10.3 %  Auto Eosinophil % : 4.3 %  Auto Basophil % : 0.2 %                        8.4    4.93  )-----------( 107      ( 17 Dec 2019 06:00 )             27.5     12-16    144  |  106  |  20  ----------------------------<  96  4.4   |  33<H>  |  0.88    Ca    8.8      16 Dec 2019 06:56    Troponin T, High Sensitivity: 249 ng/L (12.17.19 @ 23:40)  Troponin I, Serum: .936 ng/mL (12.17.19 @ 16:30)  Troponin I, Serum: .858 ng/mL (12.17.19 @ 13:40)  Troponin I, Serum: .697 ng/mL (12.17.19 @ 12:14)    < from: US Duplex Arterial Lower Ext Ltd, Right (12.16.19 @ 13:06) >  History swelling after arteriogram. There is a pseudoaneurysm in the right inguinal region. It measures approximately 6.2 cm in maximum craniocaudad length and 2 cm in thickness and 2.5 cm in width. The vascularized lumen measures a maximum internal length of 4.2 cm. There is mild eccentric mural thrombus centered in the inferior aspect.. A discrete neck is identified.  IMPRESSION: Moderately large right-sided pseudoaneurysm as above  < end of copied text >    EKG personally reviewed - 87bpm NSR w/PACs, TWI I, aVL, V1-V2; RBBB; QTc 498ms

## 2019-12-17 NOTE — CONSULT NOTE ADULT - ASSESSMENT
84 y.o. male with aortic stenosis and s/p right femoral cath. presents with a sizable pseudoaneurysm of the right femoral artery. He also has a right pelvic fracture. He will need repair of the right femoral artery. I suggest he go back to [place] for repair given his significant aortic stenosis. The patient was given informed consent.

## 2019-12-17 NOTE — H&P ADULT - PROBLEM SELECTOR PLAN 1
- patient s/p recent with cardiac cath complicated by R femoral artery pseudoaneurysm. CT pelvis shows small foci of acute hemorrhage in the anterior right groin, in a right groin hernia containing small bowel and adjacent to the urinary bladder.  - vascular consulted for possible intervention for pseudoaneurysm   - continue to monitor Hgb  - maintain active T&S - patient s/p recent with cardiac cath complicated by R femoral artery pseudoaneurysm. CT pelvis shows small foci of acute hemorrhage in the anterior right groin, in a right groin hernia containing small bowel and adjacent to the urinary bladder.  - vascular consulted for possible intervention for pseudoaneurysm  - pending CTA RLE to evaluate aneurysm  - continue to monitor Hgb  - maintain active T&S

## 2019-12-17 NOTE — DISCHARGE NOTE PROVIDER - HOSPITAL COURSE
84M PMH esophageal cancer s/p esophagectomy (2017),, CAD s/p stents, severe AS, HTN, HLD, COPD on home O2 presents for right hip pain after a fall yesterday, found to have Inferior pubic rami fracture w/ Right acetabular fracture. Pt seen by Orthopedics and no surgical intervention was indicated. Pt had recent cardiac cath and was found to have large right femoral artery pseudoaneurysm. Pt will be transferred to [facility] for possible right femoral artery repair. Vascular surgery: Dr. Cary and Hospitalist Dr. Tamara hardwick.

## 2019-12-17 NOTE — H&P ADULT - ATTENDING COMMENTS
Agree with resident H&P and plan as edited above. Notes from OSH reviewed.  VSx recs appreciated. Cards consulted overnight, to see patient this AM.

## 2019-12-17 NOTE — CONSULT NOTE ADULT - SUBJECTIVE AND OBJECTIVE BOX
History of Present Illness:  84y.o. Male with a hx. CAD and aortic stenosis had a cardiac cath. about 8 days ago at [place] and postop developed ecchymosis of right lateral hip and groin coupled with a post op pseudoaneurysm of the right femoral artery. 3 days ago he fell and fractured his right pelvis. He admits to right hip pain with movement of his right leg. He denies ischemic pedal pain at rest or claudication. He normally ambulates with a walker.  I was requested to evaluate his LE circulation.    PAST MEDICAL & SURGICAL HISTORY:  Inguinal hernia  Anxiety  Abnormal LFTs  Cerebrovascular accident (CVA) due to embolism of cerebral artery  Herpes zoster without complication  Anemia, unspecified type  SOFIA (obstructive sleep apnea)  Hypothyroidism, unspecified type  Pulmonary nodule  Aortic valve stenosis, etiology of cardiac valve disease unspecified  Advanced COPD  Coronary artery disease: s/p 3 stents on June 30, 2017 at [place] Dr. Lavelle Reid  OA (osteoarthritis) of knee: right  Former smoker, stopped smoking in distant past  Rheumatoid arthritis  Seizure disorder  Asthma  Hyperlipidemia  BPH (benign prostatic hyperplasia)  HTN (hypertension)  Esophagus cancer  Chronic allergic rhinitis  BCC (basal cell carcinoma): skin  Cerebrovascular accident (CVA)  Anxiety  Anastomotic leak following esophagectomy: [place] [facility]an esophagectomy  History of tonsillectomy  H/O heart artery stent: June 30, 2017  Knee arthropathy: left  History of total hip replacement: left  History of hernia repair      Allergies    penicillin (Rash)    Intolerances        MEDICATIONS  (STANDING):  atorvastatin 20 milliGRAM(s) Oral at bedtime  clopidogrel Tablet 75 milliGRAM(s) Oral daily  cyanocobalamin 1000 MICROGram(s) Oral daily  escitalopram 5 milliGRAM(s) Oral daily  ferrous    sulfate 325 milliGRAM(s) Oral daily  finasteride 5 milliGRAM(s) Oral daily  levothyroxine 50 MICROGram(s) Oral daily  montelukast 10 milliGRAM(s) Oral daily  pantoprazole    Tablet 40 milliGRAM(s) Oral before breakfast  phenytoin   Capsule 100 milliGRAM(s) Oral two times a day  phenytoin   Chewable 50 milliGRAM(s) Chew daily  predniSONE   Tablet 5 milliGRAM(s) Oral daily  senna 1 Tablet(s) Oral at bedtime  sodium chloride 0.65% Nasal 1 Spray(s) Both Nostrils two times a day  tamsulosin 0.4 milliGRAM(s) Oral at bedtime    MEDICATIONS  (PRN):  acetaminophen   Tablet .. 650 milliGRAM(s) Oral every 6 hours PRN Mild Pain (1 - 3), Moderate Pain (4 - 6)  albuterol/ipratropium for Nebulization. 3 milliLiter(s) Nebulizer every 6 hours PRN Shortness of Breath and/or Wheezing  ALPRAZolam 0.25 milliGRAM(s) Oral three times a day PRN anxiety  oxyCODONE    IR 5 milliGRAM(s) Oral every 6 hours PRN Severe Pain (7 - 10)      Social History:  Smoking History: former smoker  Alcohol Use: social++    REVIEW OF SYSTEMS:  CONSTITUTIONAL: No weakness, fevers or chills  EYES/ENT: No visual changes;  No vertigo or throat pain   NECK: No pain or stiffness  RESPIRATORY: No cough, wheezing, hemoptysis; No shortness of breath  CARDIOVASCULAR: No chest pain or palpitations  GASTROINTESTINAL: No abdominal or epigastric pain. No nausea, vomiting, or hematemesis; No diarrhea or constipation. No melena or hematochezia.  GENITOURINARY: No dysuria, frequency or hematuria  NEUROLOGICAL: No numbness or weakness  SKIN: No itching, burning, rashes, or lesions   Vascular:  No lower extremity claudication, pedal rest pain or digital ulcers.  ++  right hip pain and pulsatile  right groin mass.  All other review of systems is negative unless indicated above.    PHYSICAL EXAM:  General:  On exam, the patient is alert nontoxic appearing Male in no acute distress.  Vital Signs Last 24 Hrs  T(C): 36.2 (17 Dec 2019 08:16), Max: 36.4 (16 Dec 2019 15:00)  T(F): 97.2 (17 Dec 2019 08:16), Max: 97.6 (16 Dec 2019 20:57)  HR: 68 (17 Dec 2019 08:16) (64 - 77)  BP: 104/60 (17 Dec 2019 08:16) (85/50 - 113/51)  BP(mean): --  RR: 20 (17 Dec 2019 08:16) (14 - 20)  SpO2: 100% (17 Dec 2019 08:16) (98% - 100%)    Neck:  4+/4+ bilateral carotid pulses; no carotid bruit, no palpable cervical masses.  Heart:  Regular, no rubs or gallops.  ++ systolic murmer  Lungs:  Clear to auscultation.    Chest:  No chest wall deformities  Symmetrical chest expansion.   Abdomen: Soft and nontender.  No palpable masses.  No rebound, guarding or rigidity. There is a 4 x3  cm pulsatile mass overlying the right femoral artery. No active bleeding . There is ecchymosis along the lateral right hip and right suprainguinal area. There is a reducible right inguinal hernia.  Extremities:  Both  feet are warm, pink with normal capillary refill times.  There are no digital ulcers or heel decubiti.  The calf and thigh muscles are soft and nontender.  There are no palpable cords or limb cellulitis.  Heraclio's sign  is negative bilaterally.  There is no lower extremity edema, cyanosis, or rubor.  On examination of the peripheral pulses:  Left leg femoral pulse is 4/4   , popliteal pulse is 4/4   ,PT Pulse is 2/4   , DP Pulse is 2/4   Right leg femoral pulse is 4/4    ,popliteal pulse is 4/4   , PT Pulse is 2/4  , DP Pulse is 2/4  Neurological:  There are no motor or sensory deficits in either lower extremity.                          8.4    4.93  )-----------( 107      ( 17 Dec 2019 06:00 )             27.5     12-16    144  |  106  |  20  ----------------------------<  96  4.4   |  33<H>  |  0.88    Ca    8.8      16 Dec 2019 06:56              Radiology:

## 2019-12-17 NOTE — H&P ADULT - NSHPREVIEWOFSYSTEMS_GEN_ALL_CORE
REVIEW OF SYSTEMS:    CONSTITUTIONAL: No weakness, fevers or chills  EYES/ENT: No visual changes;  No vertigo or throat pain   NECK: No pain or stiffness  RESPIRATORY: + congestion, no cough, wheezing, hemoptysis; baseline shortness of breath   CARDIOVASCULAR: No chest pain or palpitations  GASTROINTESTINAL: No abdominal pain; nausea, vomiting;  diarrhea or constipation. No hematemesis, melena or hematochezia.  GENITOURINARY: No dysuria, frequency or hematuria  NEUROLOGICAL: No numbness, RLE weakness  MUSCULOSKEL: R hip pain  SKIN: No itching, burning, rashes, or lesions   All other review of systems is negative unless indicated above. REVIEW OF SYSTEMS:    CONSTITUTIONAL: No weakness, fevers or chills  EYES/ENT: No visual changes;  No vertigo or throat pain   NECK: No pain or stiffness  RESPIRATORY: + congestion, no cough, wheezing, hemoptysis; baseline shortness of breath   CARDIOVASCULAR: No chest pain or palpitations  GASTROINTESTINAL: No abdominal pain; nausea, vomiting;  diarrhea or constipation. No hematemesis, melena or hematochezia.  GENITOURINARY: No dysuria, frequency or hematuria  NEUROLOGICAL: No numbness, RLE weakness  MUSCULOSKELETAL: R hip pain  SKIN: No itching, burning, rashes, or lesions   All other review of systems is negative unless indicated above.

## 2019-12-17 NOTE — CONSULT NOTE ADULT - ATTENDING COMMENTS
chest pains  atypical presentation with "phlegm" the biomarker troponin curve is flat .69 .85. neema consider patient for treatment of a pseudoaneurysm at [facility] as planned.  a recent cath recommends "medical therapy. non interventional approach. will comply.

## 2019-12-17 NOTE — CONSULT NOTE ADULT - ASSESSMENT
ASSESSMENT   84M PMH esophageal cancer s/p esophagectomy (2017), CAD s/p stents on plavix, severe AS, HTN, HLD, COPD, RA on prednisone, CVA in the past, transferred from [facility] for further management of 6.2cm R femoral artery pseudoaneurysm    PLAN  - Recommend CTA of RLE to further evaluate RFA PSA  - Given size of the aneurysm, he will most likely require surgical repair of R femoral artery  - Please keep patient NPO  - May continue plavix  - Please obtain preop labs (new CBC, BMP, Coags)  - Please document medical/cardiac clearance, will need cardiac evaluation for known moderate-severe AS and CAD with stents  - Discussed with Vascular fellow  - Will follow    C Team Surgery  [id number]

## 2019-12-17 NOTE — DISCHARGE NOTE PROVIDER - NSDCMRMEDTOKEN_GEN_ALL_CORE_FT
acetaminophen 325 mg oral tablet: 2 tab(s) orally every 6 hours, As needed, Mild Pain (1 - 3), Moderate Pain (4 - 6)  ALPRAZolam 0.25 mg oral tablet: 1 tab(s) orally 2 times a day, As Needed  atorvastatin 20 mg oral tablet: 1 tab(s) orally once a day (at bedtime)  escitalopram 5 mg oral tablet: 1 tab(s) orally once a day  ezetimibe 10 mg oral tablet: 1 tab(s) orally once a day  ferrous sulfate 325 mg (65 mg elemental iron) oral tablet: 1 tab(s) orally once a day  finasteride 5 mg oral tablet: 1 tab(s) orally once a day  furosemide 40 mg oral tablet: 1 tab(s) orally once a day, As Needed FOR 2LB WEIGHT GAIN  ipratropium-albuterol 0.5 mg-2.5 mg/3 mLinhalation solution: 3 milliliter(s) inhaled every 6 hours, As needed, Shortness of Breath and/or Wheezing  montelukast 10 mg oral tablet: 1 tab(s) orally once a day  oxyCODONE 5 mg oral tablet: 1 tab(s) orally every 6 hours, As needed, Severe Pain (7 - 10)  pantoprazole 40 mg oral delayed release tablet: 1 tab(s) orally once a day (before a meal)  phenytoin 100 mg oral capsule, extended release: 1 cap(s) orally 2 times a day (MORNING, EVENING)  phenytoin 50 mg oral tablet, chewable: 50 milligram(s) orally once a day  Plavix 75 mg oral tablet: 1 tab(s) orally once a day  predniSONE 5 mg oral tablet: 1 tab(s) orally once a day  senna oral tablet: 1 tab(s) orally once a day (at bedtime)  sodium chloride 0.65% nasal spray:  nasal   Synthroid 50 mcg (0.05 mg) oral tablet: 1 tab(s) orally once a day  tamsulosin 0.4 mg oral capsule: 1 cap(s) orally once a day

## 2019-12-17 NOTE — H&P ADULT - PROBLEM SELECTOR PLAN 10
Transitions of Care Status:  1.  Name of PCP:  2.  PCP Contacted on Admission: [ ] Y    [ ] N    3.  PCP contacted at Discharge: [ ] Y    [ ] N    [ ] N/A  4.  Post-Discharge Appointment Date and Location:  5.  Summary of Handoff given to PCP: Transitions of Care Status:  1.  Name of PCP: Dr. Leif Santiago (PMD/Cardiologist)  2.  PCP Contacted on Admission: [ ] Y    [ x ] N - admitted at night    3.  PCP contacted at Discharge: [ ] Y    [ ] N    [ ] N/A  4.  Post-Discharge Appointment Date and Location:  5.  Summary of Handoff given to PCP:

## 2019-12-17 NOTE — H&P ADULT - PROBLEM SELECTOR PLAN 7
- esophageal cancer s/p resection, radiation therapy, and chemo  - no acute issues    Hypothyroid  - continue synthroid

## 2019-12-17 NOTE — H&P ADULT - NSHPPHYSICALEXAM_GEN_ALL_CORE
PHYSICAL EXAM:    Vital Signs Last 24 Hrs  T(C): 36.3 (17 Dec 2019 20:26), Max: 36.6 (17 Dec 2019 16:38)  T(F): 97.4 (17 Dec 2019 20:26), Max: 97.9 (17 Dec 2019 16:38)  HR: 75 (17 Dec 2019 21:46) (65 - 78)  BP: 137/58 (17 Dec 2019 20:26) (96/50 - 137/58)  BP(mean): --  RR: 18 (17 Dec 2019 20:26) (14 - 20)  SpO2: 94% (17 Dec 2019 21:46) (94% - 100%)    General: No acute distress on 3 L nasal cannula, patient with increased work of breathing while talking  HEENT: NCAT.  PERRL.  EOMI.  No scleral icterus or injection.  Moist MM.  No oropharyngeal exudates.    Neck: Supple.  Full ROM.  No JVD.    Heart: RRR.  Normal S1 and S2. + systolic murmur  Lungs: CTAB. No wheezes, crackles, or rhonchi.    Abdomen: BS+, soft, NT/ND. + R inguinal hernia, nontender, reducible   Skin: Warm and dry.  No rashes.  Extremities: R groin firm at the site of cardiac cath, nontender, no ecchymosis. 1+ pitting edema of RLE, trace edema of L. R swelling> L  Musculoskeletal: RLE weakness compared to L.   Neuro: A&Ox3.  3/5 strength in RLE, 5/5 in LUE. PHYSICAL EXAM:    Vital Signs Last 24 Hrs  T(C): 36.3 (17 Dec 2019 20:26), Max: 36.6 (17 Dec 2019 16:38)  T(F): 97.4 (17 Dec 2019 20:26), Max: 97.9 (17 Dec 2019 16:38)  HR: 75 (17 Dec 2019 21:46) (65 - 78)  BP: 137/58 (17 Dec 2019 20:26) (96/50 - 137/58)  BP(mean): --  RR: 18 (17 Dec 2019 20:26) (14 - 20)  SpO2: 94% (17 Dec 2019 21:46) (94% - 100%)    General: No acute distress on 3 L nasal cannula, patient with increased work of breathing while talking  HEENT: NCAT.  PERRL.  EOMI.  No scleral icterus or injection.  Moist MM.  No oropharyngeal exudates.    Neck: Supple.  Full ROM.  No JVD.    Heart: RRR.  Normal S1 and S2. + systolic murmur  Lungs: CTAB. No wheezes, crackles, or rhonchi.    Abdomen: BS+, soft, NT/ND. + R inguinal hernia, nontender, reducible   Skin: Warm and dry.  No rashes. ecchymosis over R groin and R hip  Extremities: R groin firm at the site of cardiac cath, nontender, no ecchymosis. 1+ pitting edema of RLE, trace edema of L. R swelling> L  Musculoskeletal: RLE weakness compared to L.   Neuro: A&Ox3.  3/5 strength in RLE, 5/5 in LUE.

## 2019-12-17 NOTE — CONSULT NOTE ADULT - SUBJECTIVE AND OBJECTIVE BOX
Chief Complaint:     HPI:    PMH:   Inguinal hernia  Anxiety  Abnormal LFTs  Cerebrovascular accident (CVA) due to embolism of cerebral artery  Herpes zoster without complication  Anemia, unspecified type  SOFIA (obstructive sleep apnea)  Hypothyroidism, unspecified type  Pulmonary nodule  Aortic valve stenosis, etiology of cardiac valve disease unspecified  Advanced COPD  Coronary artery disease  OA (osteoarthritis) of knee  Former smoker, stopped smoking in distant past  Former smoker  Rheumatoid arthritis  Seizure disorder  Asthma  Hyperlipidemia  BPH (benign prostatic hyperplasia)  HTN (hypertension)  Esophagus cancer  Chronic allergic rhinitis  BCC (basal cell carcinoma)  Cerebrovascular accident (CVA)  Anxiety    PSH:   Anastomotic leak following esophagectomy  History of tonsillectomy  H/O heart artery stent  Knee arthropathy  History of total hip replacement  History of hernia repair    Family History:  FAMILY HISTORY:  Family history of heart attack  Family history of pulmonary embolism: father @ 49 yr old  FH: CAD (coronary artery disease) (Sibling)      Social History:  Smoking:  Alcohol:  Drugs:    Allergies:  penicillin (Rash)      Medications:  acetaminophen   Tablet .. 650 milliGRAM(s) Oral every 6 hours PRN  albuterol/ipratropium for Nebulization. 3 milliLiter(s) Nebulizer every 6 hours PRN  ALPRAZolam 0.25 milliGRAM(s) Oral three times a day PRN  atorvastatin 20 milliGRAM(s) Oral at bedtime  clopidogrel Tablet 75 milliGRAM(s) Oral daily  escitalopram 5 milliGRAM(s) Oral daily  ferrous    sulfate 325 milliGRAM(s) Oral daily  finasteride 5 milliGRAM(s) Oral daily  levothyroxine 50 MICROGram(s) Oral daily  montelukast 10 milliGRAM(s) Oral daily  oxyCODONE    IR 5 milliGRAM(s) Oral every 6 hours PRN  pantoprazole    Tablet 40 milliGRAM(s) Oral before breakfast  phenytoin   Capsule 100 milliGRAM(s) Oral two times a day  phenytoin   Chewable 50 milliGRAM(s) Chew daily  predniSONE   Tablet 5 milliGRAM(s) Oral daily  senna 1 Tablet(s) Oral at bedtime  sodium chloride 0.65% Nasal 1 Spray(s) Both Nostrils two times a day  tamsulosin 0.4 milliGRAM(s) Oral at bedtime      REVIEW OF SYSTEMS:  CONSTITUTIONAL: No fever, weight loss, or fatigue  EYES: No eye pain, visual disturbances, or discharge  ENMT:  No difficulty hearing, tinnitus, vertigo; No sinus or throat pain  NECK: No pain or stiffness  BREASTS: No pain, masses, or nipple discharge  RESPIRATORY: No cough, wheezing, chills or hemoptysis; No shortness of breath  CARDIOVASCULAR: No chest pain, palpitations, dizziness, or leg swelling  GASTROINTESTINAL: No abdominal or epigastric pain. No nausea, vomiting, or hematemesis; No diarrhea or constipation. No melena or hematochezia.  GENITOURINARY: No dysuria, frequency, hematuria, or incontinence  NEUROLOGICAL: No headaches, memory loss, loss of strength, numbness, or tremors  SKIN: No itching, burning, rashes, or lesions   LYMPH NODES: No enlarged glands  ENDOCRINE: No heat or cold intolerance; No hair loss  MUSCULOSKELETAL: No joint pain or swelling; No muscle, back, or extremity pain  PSYCHIATRIC: No depression, anxiety, mood swings, or difficulty sleeping  HEME/LYMPH: No easy bruising, or bleeding gums  ALLERY AND IMMUNOLOGIC: No hives or eczema    Physical Exam:  T(C): 36.2 (12-17-19 @ 08:16), Max: 36.4 (12-16-19 @ 20:57)  HR: 68 (12-17-19 @ 08:16) (64 - 75)  BP: 104/60 (12-17-19 @ 08:16) (85/50 - 107/705)  RR: 20 (12-17-19 @ 08:16) (14 - 20)  SpO2: 100% (12-17-19 @ 08:16) (98% - 100%)  Wt(kg): --    GENERAL: NAD, well-groomed, well-developed  HEAD:  Atraumatic, Normocephalic  EYES: EOMI, conjunctiva and sclera clear  ENT: Moist mucous membranes,  NECK: Supple, No JVD, no bruits  CHEST/LUNG: Clear to percussion bilaterally; No rales, rhonchi, wheezing, or rubs  HEART: Regular rate and rhythm; No murmurs, rubs, or gallops PMI non displaced.  ABDOMEN: Soft, Nontender, Nondistended; Bowel sounds present  EXTREMITIES:  2+ Peripheral Pulses, No clubbing, cyanosis, or edema  SKIN: No rashes or lesions  NERVOUS SYSTEM:  Cranial Nerves II-XII intact     Cardiovascular Diagnostic Testing:  ECG:    < from: 12 Lead ECG (12.16.19 @ 20:13) >  Ventricular Rate 67 BPM    Atrial Rate 67 BPM    P-R Interval 192 ms    QRS Duration 142 ms    Q-T Interval 458 ms    QTC Calculation(Bezet) 483 ms    P Axis 71 degrees    R Axis -51 degrees    T Axis 55 degrees    Diagnosis Line Normal sinus rhythm  Right bundle branch block  Left anterior fascicular block  *** Bifascicular block ***  borderline trifascicular block  Septal infarct (cited on or before 14-DEC-2019)  Abnormal ECG  When compared with ECG of 14-DEC-2019 10:56,  Questionable change in initial forces of Septal leads    addendum  please note corrected time of study 12/17/19 8:00 AM    Reconfirmed by LORIE PEREZ MD ([id number]) on 12/17/2019 12:51:14 PM    < end of copied text >      ECHO:    < from: Transthoracic Echocardiogram (11.14.19 @ 12:34) >  Conclusions:  1. Calcified trileaflet aortic valve with decreased  opening. Peak transaortic valve gradient equals 41 mm Hg,  mean transaortic valve gradient equals 21 mm Hg, estimated  aortic valve area equals 0.9 sqcm (by continuity equation),  aortic valve velocity time integral equals 66 cm,  consistent with severe aortic stenosis. Mild aortic  regurgitation.  2. Severely dilated left atrium.  LA volume index = 59  cc/m2.  3. Moderate concentric left ventricular hypertrophy.  4. Normal left ventricular systolic function. Peak left  ventricular outflow tract gradient equals 3 mm Hg, mean  gradient is equal to 2 mm Hg, LVOT velocity time integral  equals 18 cm.  5. Moderate diastolic dysfunction (Stage II).  6. Right atrial enlargement.  7. Right ventricular enlargement with normal right  ventricular systolic function.  8. Estimated pulmonary artery systolic pressure equals 60  mm Hg, assuming right atrial pressure equals 8 mm Hg,  consistent with moderate pulmonary pressures.  TINO not performed secondary to severe esophageal pathology.  ------------------------------------------------------------------------  Confirmed on  11/14/2019 - 14:30:25 by Preet Mayorga M.D.    < end of copied text >      Labs:                        8.4    4.93  )-----------( 107      ( 17 Dec 2019 06:00 )             27.5     12-16    144  |  106  |  20  ----------------------------<  96  4.4   |  33<H>  |  0.88    Ca    8.8      16 Dec 2019 06:56        CARDIAC MARKERS ( 17 Dec 2019 13:40 )  .858 ng/mL / x     / x     / x     / x      CARDIAC MARKERS ( 17 Dec 2019 12:14 )  .697 ng/mL / x     / x     / x     / x              Hemoglobin A1C, Whole Blood: 5.0 % (12-16 @ 09:16)        Imaging:

## 2019-12-17 NOTE — H&P ADULT - ASSESSMENT
84M PMH esophageal cancer s/p esophagectomy (2017), CAD s/p stents, severe AS with recent cath, HTN, HLD, COPD on home O2 presents as a transfer from [place] for R femoral pseudoaneurysm repair.

## 2019-12-17 NOTE — H&P ADULT - PROBLEM SELECTOR PLAN 2
- pt s/p mechanical fall found to have fractures of the right acetabulum and inferior pubic ramus  - seen by ortho at outside hospital, no acute intervention  - PT consulted at OSH, recommending rehab - pt s/p mechanical fall found to have fractures of the right acetabulum and inferior pubic ramus  - seen by ortho at outside hospital, notes reviewed, no acute intervention  - PT consulted at OSH, recommending rehab

## 2019-12-18 NOTE — PROGRESS NOTE ADULT - PROBLEM SELECTOR PLAN 10
Transitions of Care Status:  1.  Name of PCP: Dr. Leif Santiago (PMD/Cardiologist)  2.  PCP Contacted on Admission: [ ] Y    [ x ] N - admitted at night    3.  PCP contacted at Discharge: [ ] Y    [ ] N    [ ] N/A  4.  Post-Discharge Appointment Date and Location:  5.  Summary of Handoff given to PCP:

## 2019-12-18 NOTE — PROGRESS NOTE ADULT - PROBLEM SELECTOR PLAN 1
s/p recent with cardiac cath complicated by R femoral artery pseudoaneurysm. CT pelvis shows small foci of acute hemorrhage in the anterior right groin, in a right groin hernia containing small bowel and adjacent to the urinary bladder.  - vascular consulted for possible intervention for pseudoaneurysm  - pending CTA RLE to evaluate aneurysm  - continue to monitor Hgb  - maintain active T&S

## 2019-12-18 NOTE — PROGRESS NOTE ADULT - PROBLEM SELECTOR PLAN 2
s/p mechanical fall found to have fractures of the right acetabulum and inferior pubic ramus  - seen by ortho at outside hospital, notes reviewed, no acute intervention  - PT consulted at OSH, recommending rehab

## 2019-12-18 NOTE — CONSULT NOTE ADULT - ATTENDING COMMENTS
Plan for right groin CFA pseudoaneurysm injection. The anatomy is suitable and risks were discussed.
Moderate AS on cath, severe on TTE with low gradient.  decreased heart sounds with mild to moderately diminished S2.    Likely moderate to moderate to severe AS. Euvolemic without chest pain or evidence of cardiac ischemia. No further cardiac intervention indicated prior to planned procedure/pseudoaneurysm repair.

## 2019-12-18 NOTE — PROGRESS NOTE ADULT - PROBLEM SELECTOR PLAN 6
- patient with COPD on home O2 as needed  - continue montelukast, duonebs  - CPAP at night     #Seizure disorder  - c/w phenytoin - patient with COPD on home O2 as needed  - continue montelukast, duonebs  - encourage OOB to chair, incentive spirometer   - CPAP at night     #Seizure disorder  - c/w phenytoin

## 2019-12-18 NOTE — PHYSICAL THERAPY INITIAL EVALUATION ADULT - GENERAL OBSERVATIONS, REHAB EVAL
Received supine and left sitting in chair with NC (3 L/Min) in place, call bell in reach and wife and daughter present.

## 2019-12-18 NOTE — PROGRESS NOTE ADULT - SUBJECTIVE AND OBJECTIVE BOX
[facility] Division of Hospital Medicine  Paola Tang DO  Pager: [phone]  Other Times:  [phone]    Patient is a 84y old  Male who presents with a chief complaint of femoral artery pseudoaneurysm (18 Dec 2019 10:38)    SUBJECTIVE / OVERNIGHT EVENTS:  Patient seen states he was just able to sit in a chair after a few days, denying any CP/dyspnea, only on oxygen as needed.    ADDITIONAL REVIEW OF SYSTEMS:    MEDICATIONS  (STANDING):  ALBUTerol    90 MICROgram(s) HFA Inhaler 2 Puff(s) Inhalation every 6 hours  clopidogrel Tablet 75 milliGRAM(s) Oral daily  escitalopram 5 milliGRAM(s) Oral daily  ferrous    sulfate 325 milliGRAM(s) Oral daily  finasteride 5 milliGRAM(s) Oral daily  levothyroxine 50 MICROGram(s) Oral daily  melatonin 3 milliGRAM(s) Oral at bedtime  montelukast 10 milliGRAM(s) Oral daily  pantoprazole    Tablet 40 milliGRAM(s) Oral before breakfast  phenytoin   Capsule 100 milliGRAM(s) Oral two times a day  phenytoin   Chewable 50 milliGRAM(s) Chew daily  predniSONE   Tablet 5 milliGRAM(s) Oral daily  senna 1 Tablet(s) Oral at bedtime  simvastatin 40 milliGRAM(s) Oral at bedtime  tamsulosin 0.4 milliGRAM(s) Oral at bedtime  thrombin Topical Powder 5000 International Unit(s) Topical once    MEDICATIONS  (PRN):  acetaminophen   Tablet .. 650 milliGRAM(s) Oral every 6 hours PRN Moderate Pain (4 - 6)  ALPRAZolam 0.25 milliGRAM(s) Oral three times a day PRN agitation, anxiety  sodium chloride 0.65% Nasal 1 Spray(s) Both Nostrils three times a day PRN Nasal Congestion      CAPILLARY BLOOD GLUCOSE        I&O's Summary      PHYSICAL EXAM:  Vital Signs Last 24 Hrs  T(C): 36.6 (18 Dec 2019 05:00), Max: 36.6 (17 Dec 2019 16:38)  T(F): 97.9 (18 Dec 2019 05:00), Max: 97.9 (17 Dec 2019 16:38)  HR: 78 (18 Dec 2019 10:34) (70 - 82)  BP: 123/70 (18 Dec 2019 05:00) (109/58 - 137/58)  BP(mean): --  RR: 18 (18 Dec 2019 05:00) (18 - 18)  SpO2: 96% (18 Dec 2019 10:34) (94% - 100%)  CONSTITUTIONAL: NAD, well-developed, well-groomed  EYES: PERRLA; conjunctiva and sclera clear  ENMT: Moist oral mucosa, no pharyngeal injection or exudates; normal dentition  NECK: Supple, no palpable masses; no thyromegaly  RESPIRATORY: Normal respiratory effort; lungs are clear to auscultation bilaterally  CARDIOVASCULAR: Regular rate and rhythm, normal S1 and S2, no murmur/rub/gallop; No lower extremity edema; Peripheral pulses are 2+ bilaterally  ABDOMEN: Nontender to palpation, normoactive bowel sounds, no rebound/guarding; No hepatosplenomegaly  MUSCLOSKELETAL:  Normal gait; no clubbing or cyanosis of digits; no joint swelling or tenderness to palpation  PSYCH: A+O to person, place, and time; affect appropriate  NEUROLOGY: CN 2-12 are intact and symmetric; no gross sensory deficits;   SKIN: No rashes; no palpable lesions    LABS:                        8.4    4.44  )-----------( 123      ( 18 Dec 2019 05:12 )             27.0     12-18    138  |  100  |  16  ----------------------------<  74  4.6   |  29  |  0.71    Ca    8.9      18 Dec 2019 05:12  Phos  2.3     12-18  Mg     1.9     12-18    TPro  6.1  /  Alb  2.8<L>  /  TBili  0.3  /  DBili  x   /  AST  43<H>  /  ALT  41  /  AlkPhos  214<H>  12-18    PT/INR - ( 18 Dec 2019 05:12 )   PT: 12.5 SEC;   INR: 1.12          PTT - ( 18 Dec 2019 05:12 )  PTT:30.6 SEC  CARDIAC MARKERS ( 18 Dec 2019 05:12 )  x     / x     / 58 u/L / 7.01 ng/mL / x      CARDIAC MARKERS ( 17 Dec 2019 23:40 )  x     / x     / 61 u/L / 7.93 ng/mL / x      CARDIAC MARKERS ( 17 Dec 2019 16:30 )  .936 ng/mL / x     / x     / x     / x      CARDIAC MARKERS ( 17 Dec 2019 13:40 )  .858 ng/mL / x     / x     / x     / x      CARDIAC MARKERS ( 17 Dec 2019 12:14 )  .697 ng/mL / x     / x     / x     / x                RADIOLOGY & ADDITIONAL TESTS:  Results Reviewed:   Imaging Personally Reviewed:  Electrocardiogram Personally Reviewed:    COORDINATION OF CARE:  Care Discussed with Consultants/Other Providers [Y/N]:  Prior or Outpatient Records Reviewed [Y/N]: [facility] Division of Hospital Medicine  Paola Tang DO  Pager: [phone]  Other Times:  [phone]    Patient is a 84y old  Male who presents with a chief complaint of femoral artery pseudoaneurysm (18 Dec 2019 10:38)    SUBJECTIVE / OVERNIGHT EVENTS:  Patient seen states he was just able to sit in a chair after a few days, denying any CP/dyspnea, only on oxygen as needed.    ADDITIONAL REVIEW OF SYSTEMS:    MEDICATIONS  (STANDING):  ALBUTerol    90 MICROgram(s) HFA Inhaler 2 Puff(s) Inhalation every 6 hours  clopidogrel Tablet 75 milliGRAM(s) Oral daily  escitalopram 5 milliGRAM(s) Oral daily  ferrous    sulfate 325 milliGRAM(s) Oral daily  finasteride 5 milliGRAM(s) Oral daily  levothyroxine 50 MICROGram(s) Oral daily  melatonin 3 milliGRAM(s) Oral at bedtime  montelukast 10 milliGRAM(s) Oral daily  pantoprazole    Tablet 40 milliGRAM(s) Oral before breakfast  phenytoin   Capsule 100 milliGRAM(s) Oral two times a day  phenytoin   Chewable 50 milliGRAM(s) Chew daily  predniSONE   Tablet 5 milliGRAM(s) Oral daily  senna 1 Tablet(s) Oral at bedtime  simvastatin 40 milliGRAM(s) Oral at bedtime  tamsulosin 0.4 milliGRAM(s) Oral at bedtime  thrombin Topical Powder 5000 International Unit(s) Topical once    MEDICATIONS  (PRN):  acetaminophen   Tablet .. 650 milliGRAM(s) Oral every 6 hours PRN Moderate Pain (4 - 6)  ALPRAZolam 0.25 milliGRAM(s) Oral three times a day PRN agitation, anxiety  sodium chloride 0.65% Nasal 1 Spray(s) Both Nostrils three times a day PRN Nasal Congestion      CAPILLARY BLOOD GLUCOSE        I&O's Summary      PHYSICAL EXAM:  Vital Signs Last 24 Hrs  T(C): 36.6 (18 Dec 2019 05:00), Max: 36.6 (17 Dec 2019 16:38)  T(F): 97.9 (18 Dec 2019 05:00), Max: 97.9 (17 Dec 2019 16:38)  HR: 78 (18 Dec 2019 10:34) (70 - 82)  BP: 123/70 (18 Dec 2019 05:00) (109/58 - 137/58)  BP(mean): --  RR: 18 (18 Dec 2019 05:00) (18 - 18)  SpO2: 96% (18 Dec 2019 10:34) (94% - 100%)  CONSTITUTIONAL: NAD, well-developed, well-groomed  EYES: PERRLA; conjunctiva and sclera clear  ENMT: Moist oral mucosa, no pharyngeal injection or exudates; normal dentition  NECK: Supple, no palpable masses; no thyromegaly  RESPIRATORY: Poor inspiratory effort  CARDIOVASCULAR: Regular rate and rhythm, normal S1 and S2, no murmur/rub/gallop; No lower extremity edema; Peripheral pulses are 2+ bilaterally  ABDOMEN: Nontender to palpation, normoactive bowel sounds, no rebound/guarding; No hepatosplenomegaly  MUSCULOSKELETAL: Normal gait; no clubbing or cyanosis of digits; no joint swelling or tenderness to palpation  PSYCH: A+O to person, place, and time; affect appropriate  NEUROLOGY: CN 2-12 are intact and symmetric; no gross sensory deficits;   SKIN: No rashes; no palpable lesions    LABS:                        8.4    4.44  )-----------( 123      ( 18 Dec 2019 05:12 )             27.0     12-18    138  |  100  |  16  ----------------------------<  74  4.6   |  29  |  0.71    Ca    8.9      18 Dec 2019 05:12  Phos  2.3     12-18  Mg     1.9     12-18    TPro  6.1  /  Alb  2.8<L>  /  TBili  0.3  /  DBili  x   /  AST  43<H>  /  ALT  41  /  AlkPhos  214<H>  12-18    PT/INR - ( 18 Dec 2019 05:12 )   PT: 12.5 SEC;   INR: 1.12          PTT - ( 18 Dec 2019 05:12 )  PTT:30.6 SEC  CARDIAC MARKERS ( 18 Dec 2019 05:12 )  x     / x     / 58 u/L / 7.01 ng/mL / x      CARDIAC MARKERS ( 17 Dec 2019 23:40 )  x     / x     / 61 u/L / 7.93 ng/mL / x      CARDIAC MARKERS ( 17 Dec 2019 16:30 )  .936 ng/mL / x     / x     / x     / x      CARDIAC MARKERS ( 17 Dec 2019 13:40 )  .858 ng/mL / x     / x     / x     / x      CARDIAC MARKERS ( 17 Dec 2019 12:14 )  .697 ng/mL / x     / x     / x     / x                RADIOLOGY & ADDITIONAL TESTS:  Results Reviewed:   Imaging Personally Reviewed:  Electrocardiogram Personally Reviewed:    COORDINATION OF CARE:  Care Discussed with Consultants/Other Providers [Y/N]:  Prior or Outpatient Records Reviewed [Y/N]:

## 2019-12-18 NOTE — CONSULT NOTE ADULT - SUBJECTIVE AND OBJECTIVE BOX
Patient seen and evaluated at bedside    Chief Complaint: Fall    HPI: Patient is a 83 yo M with PMH of CAD (s/p ROJAS to D1, LAD 4/2018), moderate to severe AS, HTN, HLD, hypothyroidism, COPD (on home O2 as needed), RA and esophageal cancer s/p chemo/radiation and esophagectomy who presented as transfer from [place] s/p mechanical fall with R pubic rami/acetabular fracture. Was seen by orthopedics without need for surgical intervention, however during workup also noted to have large R femoral pseudoaneurysm after recent LHC/RHC 12/9/19. Vascular surgery evaluating patient for surgical repair given size of 6.2cm aneurysm. On arrival, also noted to have elevated troponin, 249->306, CKMB 7.93->7.01, patient denies any chest pain.  Patient had recent LHC/RHC for progressive dyspnea with exertion, revealing no significant restenosis or prior stents, no intervention performed, also with moderate to severe aortic stenosis. Patient with poor baseline function, able to ambulate ~200 feet with walker before becoming short of breath. Denies chest pain, has never experienced syncope or presyncopal symptoms, denies palpitations. Compliant with cardiac medications.    Outpatient cardiologist: Dr. Santiago    PMHx:   Inguinal hernia  Anxiety  Abnormal LFTs  Cerebrovascular accident (CVA) due to embolism of cerebral artery  Herpes zoster without complication  Anemia, unspecified type  SOFIA (obstructive sleep apnea)  Hypothyroidism, unspecified type  Pulmonary nodule  Aortic valve stenosis, etiology of cardiac valve disease unspecified  Advanced COPD  Coronary artery disease  OA (osteoarthritis) of knee  Former smoker, stopped smoking in distant past  Former smoker  Rheumatoid arthritis  Seizure disorder  Asthma  Hyperlipidemia  BPH (benign prostatic hyperplasia)  HTN (hypertension)  Esophagus cancer  Chronic allergic rhinitis  BCC (basal cell carcinoma)  Cerebrovascular accident (CVA)  Anxiety    PSHx:   Anastomotic leak following esophagectomy  History of tonsillectomy  H/O heart artery stent  Knee arthropathy  History of total hip replacement  History of hernia repair    Allergies:  penicillin (Rash)    Home Meds:  · 	senna oral tablet: Last Dose Taken:  , 1 tab(s) orally once a day (at bedtime)  · 	ferrous sulfate 325 mg (65 mg elemental iron) oral tablet: Last Dose Taken:  , 1 tab(s) orally once a day  · 	ipratropium-albuterol 0.5 mg-2.5 mg/3 mLinhalation solution: Last Dose Taken:  , 3 milliliter(s) inhaled every 6 hours, As needed, Shortness of Breath and/or Wheezing  · 	acetaminophen 325 mg oral tablet: Last Dose Taken:  , 2 tab(s) orally every 6 hours, As needed, Mild Pain (1 - 3), Moderate Pain (4 - 6)  · 	pantoprazole 40 mg oral delayed release tablet: Last Dose Taken:  , 1 tab(s) orally once a day (before a meal)  · 	montelukast 10 mg oral tablet: Last Dose Taken:  , 1 tab(s) orally once a day  · 	ALPRAZolam 0.25 mg oral tablet: Last Dose Taken:  , 1 tab(s) orally 3 times a day, As Needed  · 	escitalopram 5 mg oral tablet: Last Dose Taken:  , 1 tab(s) orally once a day  · 	phenytoin 100 mg oral capsule, extended release: Last Dose Taken:  , 1 cap(s) orally 2 times a day (MORNING, EVENING)  · 	finasteride 5 mg oral tablet: Last Dose Taken:  , 1 tab(s) orally once a day  · 	ezetimibe 10 mg oral tablet: Last Dose Taken:  , 1 tab(s) orally once a day  · 	Synthroid 50 mcg (0.05 mg) oral tablet: Last Dose Taken:  , 1 tab(s) orally once a day  · 	phenytoin 50 mg oral tablet, chewable: Last Dose Taken:  , 50 milligram(s) orally once a day  · 	predniSONE 5 mg oral tablet: Last Dose Taken:  , 1 tab(s) orally once a day  · 	tamsulosin 0.4 mg oral capsule: Last Dose Taken:  , 1 cap(s) orally once a day  · 	Plavix 75 mg oral tablet: Last Dose Taken:  , 1 tab(s) orally once a day  · 	simvastatin 40 mg oral tablet: Last Dose Taken:  , 1 tab(s) orally once a day (at bedtime)    Current Medications:   acetaminophen   Tablet .. 650 milliGRAM(s) Oral every 6 hours PRN  ALBUTerol    90 MICROgram(s) HFA Inhaler 2 Puff(s) Inhalation every 6 hours  ALPRAZolam 0.25 milliGRAM(s) Oral three times a day PRN  clopidogrel Tablet 75 milliGRAM(s) Oral daily  escitalopram 5 milliGRAM(s) Oral daily  ferrous    sulfate 325 milliGRAM(s) Oral daily  finasteride 5 milliGRAM(s) Oral daily  levothyroxine 50 MICROGram(s) Oral daily  melatonin 3 milliGRAM(s) Oral at bedtime  montelukast 10 milliGRAM(s) Oral daily  pantoprazole    Tablet 40 milliGRAM(s) Oral before breakfast  phenytoin   Capsule 100 milliGRAM(s) Oral two times a day  phenytoin   Chewable 50 milliGRAM(s) Chew daily  predniSONE   Tablet 5 milliGRAM(s) Oral daily  senna 1 Tablet(s) Oral at bedtime  simvastatin 40 milliGRAM(s) Oral at bedtime  sodium chloride 0.65% Nasal 1 Spray(s) Both Nostrils three times a day PRN  tamsulosin 0.4 milliGRAM(s) Oral at bedtime  thrombin Topical Powder 5000 International Unit(s) Topical once    FAMILY HISTORY:  Family history of heart attack  Family history of pulmonary embolism: father @ 49 yr old  FH: CAD (coronary artery disease) (Sibling)    Social History:  Smoking History: Former, quit >30 years ago  Alcohol Use: Denies  Drug Use: Denies    REVIEW OF SYSTEMS:  CONSTITUTIONAL: No weakness, fevers or chills  EYES/ENT: No visual changes;  No dysphagia  NECK: No pain or stiffness  RESPIRATORY: No cough, wheezing, hemoptysis; +shortness of breath  CARDIOVASCULAR: No chest pain or palpitations; No lower extremity edema  GASTROINTESTINAL: No abdominal or epigastric pain. No nausea, vomiting, or hematemesis; No diarrhea or constipation. No melena or hematochezia.  BACK: No back pain  GENITOURINARY: No dysuria, frequency or hematuria  NEUROLOGICAL: No numbness or weakness  MUSCULOSKELETAL: +R hip/groin pain  SKIN: No itching, burning, rashes, or lesions   All other review of systems is negative unless indicated above.    Physical Exam:  T(F): 97.9 (12-18), Max: 97.9 (12-17)  HR: 78 (12-18) (70 - 82)  BP: 123/70 (12-18) (109/58 - 137/58)  RR: 18 (12-18)  SpO2: 96% (12-18)  GENERAL: Thin, ill-appearing, mild increased work of breathing on NC  HEAD:  Atraumatic, Normocephalic  ENT: EOMI, conjunctiva and sclera clear, Neck supple, No JVD, moist mucosa  CHEST/LUNG: Clear to auscultation bilaterally; No wheeze, equal breath sounds bilaterally  HEART: Normal S1, soft S2, late peaking systolic murmur over USB, regular rate and rhythm, +carotid bruit  ABDOMEN: Soft, nontender  EXTREMITIES:  No clubbing, cyanosis, nonpitting RLE edema, area of ecchymoses over groin with pulsatility noted  PSYCH: Nl behavior, nl affect  NEUROLOGY: AAOx3, non-focal, cranial nerves grossly intact  SKIN: Pale, no rashes, skin changes as above of R groin    Cardiovascular Diagnostic Testing:    ECG: sinus rhythm, RBBB morphology, TWI of I, AVL, AVR, V1-V2, unable to appreciate p waves secondary to artifact    Echo:  < from: Transthoracic Echocardiogram (11.14.19 @ 12:34) >  Observations:  Mitral Valve: Mitral annular calcification, otherwise  normal mitral valve. Mild mitral regurgitation.  Aortic Valve/Aorta: Calcified trileaflet aortic valve with  decreased opening. Peak transaortic valve gradient equals  41 mm Hg, mean transaortic valve gradient equals 21 mm Hg,  estimated aortic valve area equals 0.9 sqcm (by continuity  equation), aortic valve velocity time integral equals 66  cm, consistent with severe aortic stenosis. Mildaortic  regurgitation.  Peak left ventricular outflow tract  gradient equals 3 mm Hg, mean gradient is equal to 2 mm Hg,  LVOT velocity time integral equals 18 cm.  Aortic Root: 3.1 cm.  LVOT diameter: 2 cm.  Left Atrium: Severely dilated left atrium.  LA volume index  = 59 cc/m2.  Left Ventricle: Normal left ventricular systolic function.  Moderate concentric left ventricular hypertrophy. Moderate  diastolic dysfunction (Stage II).  Right Heart: Right atrial enlargement. Right ventricular  enlargement with normal right ventricular systolic  function. Normal tricuspid valve. Minimal tricuspid  regurgitation. Normal pulmonic valve.  Pericardium/Pleura: Normal pericardium with no pericardial  effusion.  Hemodynamic: Estimated right atrial pressure is 8 mm Hg.    < end of copied text >    Stress Testing:    Cath:  < from: Cardiac Cath Lab - Adult (12.09.19 @ 11:49) >  Valves:  Baseline  Valves:  -- AORTIC: Aortic systolic ejection period: 21.90  Valves:  -- AORTIC: Aortic valve area: 1.12  Valves:  -- AORTIC: Aortic valve flow: 164.84  Valves: -- AORTIC: Aortic valve index: 0.73  Valves:  -- AORTIC: Aortic Valve Splice - Gradient: 10.96    < from: Cardiac Cath Lab - Adult (12.09.19 @ 11:49) >  Outputs:  -- OUTPUTS: CO by Shellie: 3.61  Outputs:  -- OUTPUTS: Shellie cardiac index: 2.34    VALVES: AORTIC VALVE: There was moderate to severe aortic stenosis.  CORONARY VESSELS: The coronary circulation is right dominant.  LM:   --  LM: Normal.  LAD:   --  LAD: Normal. There was no significant restenosis.  --  D1: Angiography showed severe atherosclerosis.  CX:   --  Mid circumflex: There was a 40 % stenosis.  --  OM1: There was a 40 % stenosis.  RCA:   --  RCA: Normal.    < end of copied text >    Imaging:    CXR:  < from: Xray Chest 1 View AP/PA (12.14.19 @ 12:50) >  Chest: Chronic right lung base airspace opacity, likely   representing fibrosis or scarring..    < end of copied text >    < from: CT Angio Lower Extremity w/ IV Cont, Right (12.18.19 @ 08:07) >  IMPRESSION:  Right groin pseudoaneurysm measuring 2.3 x 1.6 x 4.2 cm with surrounding   thigh hematoma correspond with findings present on prior ultrasound.    Right superior and inferior pubic ramus fractures.    < end of copied text >    Labs: Personally reviewed                        8.4    4.44  )-----------( 123      ( 18 Dec 2019 05:12 )             27.0     12-18    138  |  100  |  16  ----------------------------<  74  4.6   |  29  |  0.71    Ca    8.9      18 Dec 2019 05:12  Phos  2.3     12-18  Mg     1.9     12-18    TPro  6.1  /  Alb  2.8<L>  /  TBili  0.3  /  DBili  x   /  AST  43<H>  /  ALT  41  /  AlkPhos  214<H>  12-18    PT/INR - ( 18 Dec 2019 05:12 )   PT: 12.5 SEC;   INR: 1.12       PTT - ( 18 Dec 2019 05:12 )  PTT:30.6 SEC    CARDIAC MARKERS ( 18 Dec 2019 05:12 )  x     / x     / x     / 58 u/L / 7.01 ng/mL / x      CARDIAC MARKERS ( 17 Dec 2019 23:40 )  x     / x     / x     / 61 u/L / 7.93 ng/mL / x      CARDIAC MARKERS ( 17 Dec 2019 16:30 )  x     / .936 ng/mL / x     / x     / x     / x      CARDIAC MARKERS ( 17 Dec 2019 13:40 )  x     / .858 ng/mL / x     / x     / x     / x      CARDIAC MARKERS ( 17 Dec 2019 12:14 )  x     / .697 ng/mL / x     / x     / x     / x        CK Total and CKMB (12.18.19 @ 05:12)    Creatine Kinase, Serum: 58: CKMB is no longer reflexively performed on elevated CK  results.  To get CKMB results please order "CK AND CKMB".  Effective Philomena 15, 2016. u/L    CKMB: 7.01 ng/mL    CKMB Relative Index: Test not performed    Hemoglobin A1C, Whole Blood: 5.0 % (12-16 @ 09:16)
VASCULAR SURGERY CONSULT NOTE    HPI:  84M PMH esophageal cancer s/p esophagectomy (2017), CAD s/p stents on plavix, severe AS, HTN, HLD, COPD on home O2 as needed, RA, CVA in the past, presented as a transfer from [place] for R femoral pseudoaneurysm. Patient had undergone elective cardiac cath at [facility] on 12/9. Patient reports that he had had bleeding from the R groin access site that required prolonged manual compression but was discharged home the same day. Five days later, on 12/14, presented to [place] for right hip pain s/p a mechanical fall at home. He was found to have Inferior pubic rami fracture and right acetabular fracture. Pt seen by Orthopedics at [place] and no surgical intervention was indicated. He was noted to have R groin pain and ecchymosis while admitted at [place] for the fall work up and found to have large right femoral artery pseudoaneurysm of duplex of the femoral artery.  Patient had had decrease in H/H while at [facility] but was never transfused.    PMHx: Inguinal hernia  Anxiety  Abnormal LFTs  Cerebrovascular accident (CVA) due to embolism of cerebral artery  Herpes zoster without complication  Anemia, unspecified type  SOFIA (obstructive sleep apnea)  Hypothyroidism, unspecified type  Pulmonary nodule  Aortic valve stenosis, etiology of cardiac valve disease unspecified  Advanced COPD  Coronary artery disease  OA (osteoarthritis) of knee  Former smoker, stopped smoking in distant past  Former smoker  Rheumatoid arthritis  Seizure disorder  Asthma  Hyperlipidemia  BPH (benign prostatic hyperplasia)  HTN (hypertension)  Esophagus cancer  Chronic allergic rhinitis  BCC (basal cell carcinoma)  Cerebrovascular accident (CVA)  Anxiety    PSHx: Anastomotic leak following esophagectomy  History of tonsillectomy  H/O heart artery stent  Knee arthropathy  History of total hip replacement  History of hernia repair    Medications (inpatient): ALBUTerol    90 MICROgram(s) HFA Inhaler 2 Puff(s) Inhalation every 6 hours  albuterol/ipratropium for Nebulization. 3 milliLiter(s) Nebulizer once  escitalopram 5 milliGRAM(s) Oral daily  ferrous    sulfate 325 milliGRAM(s) Oral daily  finasteride 5 milliGRAM(s) Oral daily  levothyroxine 50 MICROGram(s) Oral daily  melatonin 3 milliGRAM(s) Oral at bedtime  montelukast 10 milliGRAM(s) Oral daily  pantoprazole    Tablet 40 milliGRAM(s) Oral before breakfast  phenytoin   Capsule 100 milliGRAM(s) Oral two times a day  phenytoin   Chewable 50 milliGRAM(s) Chew daily  predniSONE   Tablet 5 milliGRAM(s) Oral daily  senna 1 Tablet(s) Oral at bedtime  simvastatin 40 milliGRAM(s) Oral at bedtime  sodium chloride 3%  Inhalation 10 milliLiter(s) Inhalation once  tamsulosin 0.4 milliGRAM(s) Oral at bedtime    Medications (PRN):acetaminophen   Tablet .. 650 milliGRAM(s) Oral every 6 hours PRN  ALPRAZolam 0.25 milliGRAM(s) Oral three times a day PRN  sodium chloride 0.65% Nasal 1 Spray(s) Both Nostrils three times a day PRN    Allergies: penicillin (Rash)  (Intolerances: )  Social Hx:   Family Hx: Family history of heart attack  Family history of pulmonary embolism: father @ 49 yr old  FH: CAD (coronary artery disease) (Sibling)      Physical Exam  T(C): 36.3  HR: 75 (65 - 78)  BP: 137/58 (96/50 - 137/58)  RR: 18 (14 - 20)  SpO2: 94% (94% - 100%)  Tmax: T(C): , Max: 36.6 (12-17-19 @ 16:38)    General: well developed, well nourished, NAD  Neuro: alert and oriented x4  Respiratory: nonlabored on 4LNC  CVS: regular rate and rhythm  Abdomen: soft, nontender, nondistended  Extremities: 1+ edema of RLE, LLE nonedematous, sensation and movement grossly intact bilaterally, right femoral pulsatile bulge appreciated in the R groin, no overlying erythema or skin breakdown, DP and PT pulses bilaterally, no skins breakdown through either LE, residual ecchymosis in medial R thigh and dependent area of lateral R thigh, no tenderness to palpation throughout either LE  Skin: warm, dry, appropriate color    Labs:                        8.4    4.93  )-----------( 107      ( 17 Dec 2019 06:00 )             27.5       12-16    144  |  106  |  20  ----------------------------<  96  4.4   |  33<H>  |  0.88    Ca    8.8      16 Dec 2019 06:56    Imaging and other studies:    < from: US Duplex Arterial Lower Ext Ltd, Right (12.16.19 @ 13:06) >  History swelling after arteriogram    There is a pseudoaneurysm in the right inguinal region. It measures   approximately 6.2 cm in maximum craniocaudad length and 2 cm in thickness   and 2.5 cm in width. The vascularized lumen measures a maximum internal   length of 4.2 cm. There is mild eccentric mural thrombus centered in the   inferior aspect.. A discrete neck is identified.    IMPRESSION:  Moderately large right-sided pseudoaneurysm as above    < end of copied text >

## 2019-12-18 NOTE — PROGRESS NOTE ADULT - ASSESSMENT
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

## 2019-12-18 NOTE — PHYSICAL THERAPY INITIAL EVALUATION ADULT - PERTINENT HX OF CURRENT PROBLEM, REHAB EVAL
Recent cardiac cath on 12/9 and developed ecchymosis of R lateral hip and groin; Recent cardiac cath on 12/9 and developed ecchymosis of R lateral hip and groin; Also, patient had presented to [place] for right hip pain after a fall. Now transferred to [facility].

## 2019-12-18 NOTE — CONSULT NOTE ADULT - ASSESSMENT
83 yo M with PMH of CAD (s/p ROJAS to D1, LAD 4/2018), moderate to severe AS, diastolic HF, HTN, HLD, hypothyroidism, COPD (on home O2 as needed), RA and esophageal cancer s/p chemo/radiation and esophagectomy who presented for surgical evaluation of large R femoral pseudoaneurysm, cardiology consulted for risk stratification.    #Pre-operative risk stratification  - Given known CAD and moderate to severe aortic stenosis, patient is at high risk of eligio-operative MACE for high risk procedure, although there is no additional cardiac workup or intervention that would benefit patient prior to planned surgery  - Patient euvolemic on exam, thus medically optimized for procedure, would monitor volume status eligio-operatively and avoid hypotension given known aortic stenosis  - Would continue single anti-platelet agent for known CAD if no contraindication from surgical standpoint    #Moderate to severe aortic stenosis  - Patient well compensated on exam without volume overload, would not benefit from valvular intervention prior to surgery  - Continue to optimize volume status, avoid hypotension during procedure as above    #Elevated troponin  #CAD  #HLD  - Troponin and CKMB flat, patient without chest pain, ECG on admission without acute ischemic changes, elevation not secondary to ACS  - Recent C 12/9/19 with no significant restenosis of prior stents and no lesions suitable for intervention, would not benefit from repeat catheterization at this time  - Continue home Plavix and simvastatin    #Macrocytic anemia  - Evidence of iron deficiency on recent studies  - Recommend checking folate  - On daily iron supplementation, would continue to address anemia for medical optimization of surgery to reduce stressors    #History of HTN  - Not currently on antihypertensives  - BP within goal, would avoid overt hypotension as above    Patient to be discussed with attending. Please await attending addendum.    Jaja Tran MD  Cardiology Fellow  [phone]  All Cardiology service information can be found 24/7 on [email or web address], password: [email or web address] 85 yo M with PMH of CAD (s/p ROJAS to D1, LAD 4/2018), moderate to severe AS, diastolic HF, HTN, HLD, hypothyroidism, COPD (on home O2 as needed), RA and esophageal cancer s/p chemo/radiation and esophagectomy who presented for surgical evaluation of large R femoral pseudoaneurysm, cardiology consulted for risk stratification.    #Pre-operative risk stratification  - Given known CAD and moderate to severe aortic stenosis, patient is at high risk of eligio-operative MACE for high risk procedure, although there is no additional cardiac workup or intervention that would benefit patient prior to planned surgery  - Patient euvolemic on exam, thus medically optimized for procedure, would monitor volume status eligio-operatively and avoid hypotension given known aortic stenosis  - Would continue single anti-platelet agent for known CAD if no contraindication from surgical standpoint    #Moderate to severe aortic stenosis  - Patient well compensated on exam without volume overload, would not benefit from valvular intervention prior to surgery  - Continue to optimize volume status, avoid hypotension during procedure as above    #Elevated troponin  #CAD  #HLD  - Troponin and CKMB flat, patient without chest pain, ECG on admission without acute ischemic changes, elevation not secondary to ACS  - Recent LHC 12/9/19 with no significant restenosis of prior stents and no lesions suitable for intervention, would not benefit from repeat catheterization at this time  - Continue home Plavix and simvastatin    #Macrocytic anemia  - Evidence of iron deficiency on recent studies  - Recommend checking folate  - On daily iron supplementation, would continue to address anemia for medical optimization of surgery to reduce stressors    #History of HTN  - Not currently on antihypertensives  - BP within goal, would avoid overt hypotension as above    Jaja Tran MD  Cardiology Fellow  [phone]  All Cardiology service information can be found 24/7 on [email or web address], password: [email or web address]

## 2019-12-18 NOTE — PROGRESS NOTE ADULT - PROBLEM SELECTOR PLAN 3
CAD s/p stent 3 years ago, on plavix, recent cardiac cath on 12/9, no intervention at that time. Cardiac cath showed no significant restenosis. D1: severe atherosclerosis. CX: 40% stenosis. OM1: 40% stenosis. RCA: normal.   - pt with an episode of chest pain resolved  - monitor EKG, trend cardiac enzymes  - continue plavix (okay with vascular team), statin  - as per Cardiology, patient high risk of eligio-operative MACE for high risk procedure, although there is no additional cardiac workup or intervention that would benefit patient prior to planned surgery

## 2019-12-18 NOTE — CHART NOTE - NSCHARTNOTEFT_GEN_A_CORE
Follow Up NOTE: Now s/p thrombin injection for R femoral artery pseudoaneurysm.       SUBJECTIVE/ROS:  Patient feels well, Examined bedside. Denies pain  Denies nausea, vomiting, chest pain, shortness of breath         MEDICATIONS  (STANDING):  ALBUTerol    90 MICROgram(s) HFA Inhaler 2 Puff(s) Inhalation every 6 hours  clopidogrel Tablet 75 milliGRAM(s) Oral daily  escitalopram 5 milliGRAM(s) Oral daily  ferrous    sulfate 325 milliGRAM(s) Oral daily  finasteride 5 milliGRAM(s) Oral daily  levothyroxine 50 MICROGram(s) Oral daily  melatonin 3 milliGRAM(s) Oral at bedtime  montelukast 10 milliGRAM(s) Oral daily  pantoprazole    Tablet 40 milliGRAM(s) Oral before breakfast  phenytoin   Capsule 100 milliGRAM(s) Oral two times a day  phenytoin   Chewable 50 milliGRAM(s) Chew daily  predniSONE   Tablet 5 milliGRAM(s) Oral daily  senna 1 Tablet(s) Oral at bedtime  simvastatin 40 milliGRAM(s) Oral at bedtime  tamsulosin 0.4 milliGRAM(s) Oral at bedtime  thrombin Topical Powder 5000 International Unit(s) Topical once    MEDICATIONS  (PRN):  acetaminophen   Tablet .. 650 milliGRAM(s) Oral every 6 hours PRN Moderate Pain (4 - 6)  ALPRAZolam 0.25 milliGRAM(s) Oral three times a day PRN agitation, anxiety  sodium chloride 0.65% Nasal 1 Spray(s) Both Nostrils three times a day PRN Nasal Congestion      OBJECTIVE:    PHYSICAL EXAM:   General: NAD, Lying in bed comfortably, AAOx3  Pulm: Non-labored breathing on RA  Vasc/Ext: LLE nonedematous, sensation and movement grossly intact bilaterally, right femoral pulsatile bulge appreciated in the R groin, no overlying erythema or skin breakdown, DP and PT pulses bilaterally, no skins breakdown through either LE, no tenderness to palpation throughout either LE    Vital Signs Last 24 Hrs  T(C): 36.4 (18 Dec 2019 14:52), Max: 36.6 (18 Dec 2019 01:31)  T(F): 97.6 (18 Dec 2019 14:52), Max: 97.9 (18 Dec 2019 01:31)  HR: 77 (18 Dec 2019 20:13) (70 - 88)  BP: 141/68 (18 Dec 2019 14:52) (123/70 - 141/68)  BP(mean): --  RR: 18 (18 Dec 2019 14:52) (18 - 18)  SpO2: 96% (18 Dec 2019 20:13) (94% - 100%)        I&O's Detail    18 Dec 2019 07:01  -  18 Dec 2019 22:14  --------------------------------------------------------  IN:    Oral Fluid: 480 mL  Total IN: 480 mL    OUT:    Voided: 200 mL  Total OUT: 200 mL    Total NET: 280 mL        LABS:                        8.4    4.44  )-----------( 123      ( 18 Dec 2019 05:12 )             27.0     12-18    138  |  100  |  16  ----------------------------<  74  4.6   |  29  |  0.71    Ca    8.9      18 Dec 2019 05:12  Phos  2.3     12-18  Mg     1.9     12-18    TPro  6.1  /  Alb  2.8<L>  /  TBili  0.3  /  DBili  x   /  AST  43<H>  /  ALT  41  /  AlkPhos  214<H>  12-18    PT/INR - ( 18 Dec 2019 05:12 )   PT: 12.5 SEC;   INR: 1.12          PTT - ( 18 Dec 2019 05:12 )  PTT:30.6 SEC

## 2019-12-18 NOTE — PROGRESS NOTE ADULT - SUBJECTIVE AND OBJECTIVE BOX
Vascular Surgery Progress Note    Patient seen and examined at the bedside. Feeling well this morning. Minimal sleep overnight. Tolerated CPAP well. Still pending CTA. Denies any new pain or numbness or weakness in the RLE.    VITALS  T(C): 36.6 (12-18-19 @ 05:00), Max: 36.6 (12-17-19 @ 16:38)  HR: 79 (12-18-19 @ 05:00) (68 - 80)  BP: 123/70 (12-18-19 @ 05:00) (104/60 - 137/58)  RR: 18 (12-18-19 @ 05:00) (18 - 20)  SpO2: 100% (12-18-19 @ 05:00) (94% - 100%)    Is/Os    PHYSICAL EXAM:   General: NAD, Lying in bed comfortably, alert, oriented x3  Pulm: Non-labored breathing on RA  Vasc/Ext: Extremities: 1+ edema of RLE, LLE nonedematous, sensation and movement grossly intact bilaterally, right femoral pulsatile bulge appreciated in the R groin, no overlying erythema or skin breakdown, DP and PT pulses bilaterally, no skins breakdown through either LE, residual ecchymosis in medial R thigh and dependent area of lateral R thigh, no tenderness to palpation throughout either LE    LABS  CBC (12-18 @ 05:12)                              8.4<L>                         4.44    )----------------(  123<L>     77.4<H>% Neutrophils, 9.9<L>% Lymphocytes, ANC: 3.44                                27.0<L>  CBC (12-17 @ 06:00)                              8.4<L>                         4.93    )----------------(  107<L>     75.3  % Neutrophils, 9.3<L>% Lymphocytes, ANC: 3.71                                27.5<L>    BMP (12-18 @ 05:12)             138     |  100     |  16    		Ca++ --      Ca 8.9                ---------------------------------( 74    		Mg 1.9                4.6     |  29      |  0.71  			Ph 2.3<L>    LFTs (12-18 @ 05:12)      TPro 6.1 / Alb 2.8<L> / TBili 0.3 / DBili -- / AST 43<H> / ALT 41 / AlkPhos 214<H>    Coags (12-18 @ 05:12)  aPTT 30.6 / INR 1.12 / PT 12.5    Cardiac Markers (12-17 @ 23:40)     HSTrop: 249 / CKMB: 7.93 / CK: 61        IMAGING STUDIES

## 2019-12-18 NOTE — PROGRESS NOTE ADULT - ASSESSMENT
84M PMH esophageal cancer s/p esophagectomy (2017), CAD s/p stents, severe AS with recent cath, HTN, HLD, COPD on home O2 presents as a transfer from [place] for R femoral pseudoaneurysm repair.

## 2019-12-19 NOTE — PROGRESS NOTE ADULT - PROBLEM SELECTOR PLAN 1
s/p recent with cardiac cath complicated by R femoral artery pseudoaneurysm. CT pelvis shows small foci of acute hemorrhage in the anterior right groin, in a right groin hernia containing small bowel and adjacent to the urinary bladder.  - vascular consulted for possible intervention for pseudoaneurysm  - pending CTA RLE to evaluate aneurysm  - continue to monitor Hgb  - maintain active T&S  s/p thrombin with resolution of pseudoaneurysm

## 2019-12-19 NOTE — CHART NOTE - NSCHARTNOTEFT_GEN_A_CORE
Arterial duplex results noted as below.     VA Duplex Arterial Lower Ext, Right. (12.19.19 @ 07:58) >    The pseudoaneurysm noted in the previous study of 12/18/19 is no longer seen in today's study. The right external iliac, common femoral, superficial femoral, and proximal deep femoral arteries are patent, and demonstrate normal velocitieswith triphasic waveforms. No hemodynamically significant stenosis. The right common femoral vein is patent and compressible without evidence of thrombosis.      84M with 6.2cm R femoral artery pseudoaneurysm now s/p thrombin injection       - No further vascular intervention indicated.  - Further care per medical team.  - Patient may follow up with Dr. Cary as an outpatient in 2-3 weeks from discharge.  - Reconsult as needed by paging the number below.      Vascular Surgery  [id number]

## 2019-12-19 NOTE — DISCHARGE NOTE PROVIDER - NSDCFUSCHEDAPPT_GEN_ALL_CORE_FT
MEJIA LATIF ; 12/31/2019 ; NPP CT Surg 300 Comm MEJIA Ferrera ; 01/06/2020 ; NPP Pulmmed 10 [facility] MEJIA LATIF ; 12/31/2019 ; NPP CT Surg 300 [address] MEJIA Ferrera ; 01/06/2020 ; NPP Pulmmed 10 [facility] MEJIA LATIF ; 12/31/2019 ; NPP CT Surg 300 [address] MEJIA Ferrera ; 01/06/2020 ; NPP Pulmmed 10 [facility]

## 2019-12-19 NOTE — DISCHARGE NOTE PROVIDER - HOSPITAL COURSE
84M PMH esophageal cancer s/p esophagectomy (2017), CAD s/p stents, severe AS with recent cath, HTN, HLD, COPD on home O2 presents as a transfer from [place] for R femoral pseudoaneurysm repair.             Problem #1 :     Pseudoaneurysm of right femoral artery.  Plan: s/p recent with cardiac cath complicated by R femoral artery pseudoaneurysm. CT pelvis shows small foci of acute hemorrhage in the anterior right groin, in a right groin hernia containing small bowel and adjacent to the urinary bladder.    - vascular consulted for possible intervention for pseudoaneurysm    - CTA RLE was done to evaluate aneurysm. CTA showed Right groin pseudoaneurysm measuring 2.3 x 1.6 x 4.2 cm with surrounding thigh hematoma correspond with findings present on prior ultrasound. Right superior and inferior pubic ramus fractures.    -Patient underwent thrombin injection 12/18 with resolution of pseudoaneurysm.              Problem #2 : Pubic ramus fracture.  Plan: s/p mechanical fall found to have fractures of the right acetabulum and inferior pubic ramus    - seen by ortho at outside hospital, notes reviewed, no acute intervention    - PT consulted at OSH, recommending rehab.             Problem #3 : CAD (coronary artery disease).  Plan: CAD s/p stent 3 years ago, on plavix, recent cardiac cath on 12/9, no intervention at that time. Cardiac cath showed no significant restenosis. D1: severe atherosclerosis. CX: 40% stenosis. OM1: 40% stenosis. RCA: normal.     - pt with an episode of chest pain resolved    - monitor EKG, trend cardiac enzymes    - continue plavix (okay with vascular team), statin    - as per Cardiology, patient high risk of eligio-operative MACE for high risk procedure, although there is no additional cardiac workup or intervention that would benefit patient prior to planned surgery.          Problem/Plan - 4:    ·  Problem: Aortic stenosis, severe.  Plan: - TTE on 11/14/19 consistent with severe aortic stenosis    - continue to monitor    - f/u cards recs.          Problem/Plan - 5:    ·  Problem: Anemia, unspecified type.  Plan: - anemia with Hgb 8.4, stable (9.8 on 12/9), anemia of chronic disease    - no signs of acute blood loss    - continue Iron supplementation    - continue to trend Hgb            # mild thrombocytopenia     - monitor cbc    - no signs of active bleeding    - continue to trend        RA    - continue prednisone 5 daily.          Problem/Plan - 6:    Problem: COPD (chronic obstructive pulmonary disease). Plan: - patient with COPD on home O2 as needed    - continue montelukast, duonebs    - encourage OOB to chair, incentive spirometer     - CPAP at night         #Seizure disorder    - c/w phenytoin.         Problem/Plan - 7:    ·  Problem: Esophagus cancer.  Plan: - esophageal cancer s/p resection, radiation therapy, and chemo    - no acute issues        Hypothyroid    - continue synthroid.          Problem/Plan - 8:    ·  Problem: BPH (benign prostatic hyperplasia).  Plan: - continue finasteride, tamsulosin 84M PMH esophageal cancer s/p esophagectomy (2017), CAD s/p stents, severe AS with recent cath, HTN, HLD, COPD on home O2 presents as a transfer from [place] for R femoral pseudoaneurysm repair.             Problem #1:     Pseudoaneurysm of right femoral artery.  Plan: s/p recent with cardiac cath complicated by R femoral artery pseudoaneurysm. CT pelvis shows small foci of acute hemorrhage in the anterior right groin, in a right groin hernia containing small bowel and adjacent to the urinary bladder.    - vascular consulted for possible intervention for pseudoaneurysm    - CTA RLE was done to evaluate aneurysm. CTA showed Right groin pseudoaneurysm measuring 2.3 x 1.6 x 4.2 cm with surrounding thigh hematoma correspond with findings present on prior ultrasound. Right superior and inferior pubic ramus fractures.    -Patient underwent thrombin injection 12/18 with resolution of pseudoaneurysm.              Problem #2:     Pubic ramus fracture.  Plan: s/p mechanical fall found to have fractures of the right acetabulum and inferior pubic ramus    - seen by ortho at outside hospital, notes reviewed, no acute intervention    - PT consulted at OSH, recommending rehab.             Problem #3:     CAD (coronary artery disease).  Plan: CAD s/p stent 3 years ago, on plavix, recent cardiac cath on 12/9, no intervention at that time. Cardiac cath showed no significant restenosis. D1: severe atherosclerosis. CX: 40% stenosis. OM1: 40% stenosis. RCA: normal.     - pt with an episode of chest pain resolved    - monitor EKG, trend cardiac enzymes    - continue plavix (okay with vascular team), statin    - as per Cardiology, patient high risk of eligio-operative MACE for high risk procedure, although there is no additional cardiac workup or intervention that would benefit patient prior to planned surgery.         Problem #4:      Aortic stenosis, severe.  Plan: - TTE on 11/14/19 consistent with severe aortic stenosis    -  Follow up with outpatient cardiologist for further assessment of AS, would require structural evaluation if plan for intervention            Problem #5:      Anemia, unspecified type.  Plan: - anemia with Hgb 8.4, stable (9.8 on 12/9), anemia of chronic disease    - no signs of acute blood loss    - continue Iron supplementation    - continue to trend Hgb            Problem #6:     mild thrombocytopenia     - monitor cbc    - no signs of active bleeding    - continue to trend        Problem #7:     RA    - continue prednisone 5 daily.             Problem #8:     COPD (chronic obstructive pulmonary disease). Plan: - patient with COPD on home O2 as needed    - continue montelukast, duonebs    - encourage OOB to chair, incentive spirometer     - CPAP 5 at night         Problem #9:    Seizure disorder    - c/w phenytoin.        Problem #10:     Esophagus cancer.  Plan: - esophageal cancer s/p resection, radiation therapy, and chemo    - no acute issues        Problem #10:     Hypothyroid    - continue synthroid.          Problem #11:     BPH (benign prostatic hyperplasia).     - continue finasteride, tamsulosin         Patient stable and cleared for discharge home d/w Vascular team, cardiology fellow and Dr. Goodman.

## 2019-12-19 NOTE — DISCHARGE NOTE PROVIDER - NSDCFUADDAPPT_GEN_ALL_CORE_FT
Patient may follow up with Dr. Cary Vascular Surgeon as an outpatient in 2-3 weeks from discharge. Call for an appointment.

## 2019-12-19 NOTE — PROGRESS NOTE ADULT - ASSESSMENT
ASSESSMENT   84M PMH esophageal cancer s/p esophagectomy (2017), CAD s/p stents on plavix, severe AS, HTN, HLD, COPD, RA on prednisone, CVA in the past, transferred from [facility] for further management of 6.2cm R femoral artery pseudoaneurysm    PLAN  - Will need repeat duplex today to evaluation RFA PSA  - May continue plavix  - Discussed with Vascular fellow  - Will follow    C Team Surgery  [id number]

## 2019-12-19 NOTE — PROGRESS NOTE ADULT - ASSESSMENT
84M PMH esophageal cancer s/p esophagectomy (2017), CAD s/p stents, severe AS with recent cath, HTN, HLD, COPD on home O2 presents as a transfer from [place] for R femoral pseudoaneurysm repair.

## 2019-12-19 NOTE — DISCHARGE NOTE NURSING/CASE MANAGEMENT/SOCIAL WORK - PATIENT PORTAL LINK FT
You can access the FollowMyHealth Patient Portal offered by [facility] by registering at the following website: [email or web address]. By joining [employer]’s FollowMyHealth portal, you will also be able to view your health information using other applications (apps) compatible with our system.

## 2019-12-19 NOTE — DISCHARGE NOTE PROVIDER - NSDCMRMEDTOKEN_GEN_ALL_CORE_FT
acetaminophen 325 mg oral tablet: 2 tab(s) orally every 6 hours, As needed, Mild Pain (1 - 3), Moderate Pain (4 - 6)  ALPRAZolam 0.25 mg oral tablet: 1 tab(s) orally 3 times a day, As Needed  escitalopram 5 mg oral tablet: 1 tab(s) orally once a day  ezetimibe 10 mg oral tablet: 1 tab(s) orally once a day  ferrous sulfate 325 mg (65 mg elemental iron) oral tablet: 1 tab(s) orally once a day  finasteride 5 mg oral tablet: 1 tab(s) orally once a day  ipratropium-albuterol 0.5 mg-2.5 mg/3 mLinhalation solution: 3 milliliter(s) inhaled every 6 hours, As needed, Shortness of Breath and/or Wheezing  montelukast 10 mg oral tablet: 1 tab(s) orally once a day  pantoprazole 40 mg oral delayed release tablet: 1 tab(s) orally once a day (before a meal)  phenytoin 100 mg oral capsule, extended release: 1 cap(s) orally 2 times a day (MORNING, EVENING)  phenytoin 50 mg oral tablet, chewable: 50 milligram(s) orally once a day  Plavix 75 mg oral tablet: 1 tab(s) orally once a day  predniSONE 5 mg oral tablet: 1 tab(s) orally once a day  senna oral tablet: 1 tab(s) orally once a day (at bedtime)  simvastatin 40 mg oral tablet: 1 tab(s) orally once a day (at bedtime)  sodium chloride 0.65% nasal spray:  nasal   Synthroid 50 mcg (0.05 mg) oral tablet: 1 tab(s) orally once a day  tamsulosin 0.4 mg oral capsule: 1 cap(s) orally once a day

## 2019-12-19 NOTE — PROGRESS NOTE ADULT - PROBLEM SELECTOR PLAN 6
- patient with COPD on home O2 as needed  - continue montelukast, duonebs  - encourage OOB to chair, incentive spirometer   - CPAP at night     #Seizure disorder  - c/w phenytoin

## 2019-12-19 NOTE — DISCHARGE NOTE PROVIDER - CARE PROVIDER_API CALL
Abdifatah Cary)  Vascular Surgery  [id number] [place] [address]  Phone: [phone]  Fax: [phone]  Follow Up Time: 2 weeks    Dr. Loo,   Phone: (   )    -  Fax: (   )    -  Follow Up Time:

## 2019-12-19 NOTE — PROGRESS NOTE ADULT - SUBJECTIVE AND OBJECTIVE BOX
[facility] Division of Hospital Medicine  Ella Goodman MD  Pager [phone]    Patient is a 84y old  Male who presents with a chief complaint of femoral artery pseudoaneurysm      SUBJECTIVE / OVERNIGHT EVENTS: feeling well, no complaints; wife at bedside      MEDICATIONS  (STANDING):  ALBUTerol    90 MICROgram(s) HFA Inhaler 2 Puff(s) Inhalation every 6 hours  clopidogrel Tablet 75 milliGRAM(s) Oral daily  escitalopram 5 milliGRAM(s) Oral daily  ferrous    sulfate 325 milliGRAM(s) Oral daily  finasteride 5 milliGRAM(s) Oral daily  levothyroxine 50 MICROGram(s) Oral daily  melatonin 3 milliGRAM(s) Oral at bedtime  montelukast 10 milliGRAM(s) Oral daily  pantoprazole    Tablet 40 milliGRAM(s) Oral before breakfast  phenytoin   Capsule 100 milliGRAM(s) Oral two times a day  phenytoin   Chewable 50 milliGRAM(s) Chew daily  predniSONE   Tablet 5 milliGRAM(s) Oral daily  senna 1 Tablet(s) Oral at bedtime  simvastatin 40 milliGRAM(s) Oral at bedtime  tamsulosin 0.4 milliGRAM(s) Oral at bedtime  thrombin Topical Powder 5000 International Unit(s) Topical once    MEDICATIONS  (PRN):  acetaminophen   Tablet .. 650 milliGRAM(s) Oral every 6 hours PRN Moderate Pain (4 - 6)  ALPRAZolam 0.25 milliGRAM(s) Oral three times a day PRN agitation, anxiety  sodium chloride 0.65% Nasal 1 Spray(s) Both Nostrils three times a day PRN Nasal Congestion            PHYSICAL EXAM:  Vital Signs Last 24 Hrs  T(F): 97.2 (19 Dec 2019 13:20), Max: 97.8 (18 Dec 2019 22:00)  HR: 75 (19 Dec 2019 13:20) (70 - 88)  BP: 123/72 (19 Dec 2019 13:20) (112/65 - 141/68)  RR: 18 (19 Dec 2019 13:20) (17 - 18)  SpO2: 98% (19 Dec 2019 13:20) (96% - 99%)    CONSTITUTIONAL: NAD  ENMT: Moist oral mucosa  RESPIRATORY: Normal respiratory effort; lungs are clear to auscultation bilaterally  CARDIOVASCULAR: Regular rate and rhythm; No lower extremity edema;   ABDOMEN: Nontender to palpation, normoactive bowel sounds  MUSCULOSKELETAL:  no clubbing or cyanosis of digits; no joint swelling or tenderness to palpation  PSYCH: A+O to person, place, and time; affect appropriate  NEUROLOGY:  no gross sensory deficits   SKIN: No rashes; no palpable lesions    LABS:                        8.7    3.62  )-----------( 119      ( 19 Dec 2019 05:45 )             27.6     12-19    140  |  99  |  12  ----------------------------<  74  4.2   |  31  |  0.67    Ca    9.0      19 Dec 2019 05:45  Phos  2.7     12-19  Mg     1.9     12-19           PTT - ( 18 Dec 2019 05:12 )  PTT:30.6 SEC  CARDIAC MARKERS ( 19 Dec 2019 05:45 )  x     / x     / 51 u/L / 6.56 ng/mL / x      CARDIAC MARKERS ( 18 Dec 2019 05:12 )  x     / x     / 58 u/L / 7.01 ng/mL / x      CARDIAC MARKERS ( 17 Dec 2019 23:40 )  x     / x     / 61 u/L / 7.93 ng/mL / x      CARDIAC MARKERS ( 17 Dec 2019 16:30 )  .936 ng/mL / x     / x     / x     / x

## 2019-12-19 NOTE — PROGRESS NOTE ADULT - REASON FOR ADMISSION
femoral artery pseudoaneurysm

## 2019-12-19 NOTE — DISCHARGE NOTE PROVIDER - PROVIDER TOKENS
PROVIDER:[TOKEN:[[id number]],FOLLOWUP:[2 weeks]],FREE:[LAST:[Dr. Loo],PHONE:[(   )    -],FAX:[(   )    -]]

## 2019-12-19 NOTE — PROGRESS NOTE ADULT - ATTENDING COMMENTS
elevated hs-troponin not ACS.  continue medical management.    outpt follow-up for mod-severe AS.
await rehab placement  medically stable

## 2019-12-19 NOTE — DISCHARGE NOTE PROVIDER - NSDCCPCAREPLAN_GEN_ALL_CORE_FT
PRINCIPAL DISCHARGE DIAGNOSIS  Diagnosis: Pseudoaneurysm of right femoral artery  Assessment and Plan of Treatment: Patient s/p thrombin injection 12/18. Outpatient follow up with Dr. Cary in 2-3 weeks, call and make an appointment      SECONDARY DISCHARGE DIAGNOSES  Diagnosis: Aortic stenosis, severe  Assessment and Plan of Treatment: Outpatient follow up with your Cardiologist for further workup and potential need for intervention    Diagnosis: COPD (chronic obstructive pulmonary disease)  Assessment and Plan of Treatment: continue your inhalers as ordered and prednsione as ordered    Diagnosis: CAD (coronary artery disease)  Assessment and Plan of Treatment: Continue Plavix, do not stop unless instructed by your physician.  Continue low salt, fat, cholesterol diet. Follow up with cardiologist and primary care physician's routine appointment.      Diagnosis: BPH (benign prostatic hyperplasia)  Assessment and Plan of Treatment: continue flomax and proscar    Diagnosis: Pubic ramus fracture  Assessment and Plan of Treatment: seen by orthopedic at [place] no intervention needed, rehab    Diagnosis: Esophagus cancer  Assessment and Plan of Treatment: s/p resection, radiation therapy, and chemo  - no acute issues

## 2019-12-19 NOTE — PROGRESS NOTE ADULT - ASSESSMENT
83 yo M with PMH of CAD (s/p ROJAS to D1, LAD 4/2018), moderate to severe AS, diastolic HF, HTN, HLD, hypothyroidism, COPD (on home O2 as needed), RA and esophageal cancer s/p chemo/radiation and esophagectomy who presented for surgical evaluation of large R femoral pseudoaneurysm, now s/p thrombin injection, awaiting rehab placement.    #Moderate to severe aortic stenosis  - Patient well compensated on exam without volume overload  - Follow up with outpatient cardiologist for further assessment of AS, would require structural evaluation if plan for intervention    #Elevated troponin  #CAD  #HLD  - Troponin and CKMB flat, patient without chest pain, ECG on admission without acute ischemic changes, elevation not secondary to ACS  - Recent LHC 12/9/19 with no significant restenosis of prior stents and no lesions suitable for intervention, would not benefit from repeat catheterization at this time  - Continue home Plavix and simvastatin    #Macrocytic anemia  - Evidence of iron deficiency on recent studies  - On daily iron supplementation, would continue to address anemia    #History of HTN  - Not currently on antihypertensives    Patient discussed with attending.    Jaja Tran MD  Cardiology Fellow  [phone]  All Cardiology service information can be found 24/7 on [email or web address], password: [email or web address] 83 yo M with PMH of CAD (s/p ROJAS to D1, LAD 4/2018), moderate to severe AS, diastolic HF, HTN, HLD, hypothyroidism, COPD (on home O2 as needed), RA and esophageal cancer s/p chemo/radiation and esophagectomy who presented for surgical evaluation of large R femoral pseudoaneurysm, now s/p thrombin injection, awaiting rehab placement.    #Moderate to severe aortic stenosis  - Patient well compensated on exam without volume overload  - Follow up with outpatient cardiologist for further assessment of AS, would require structural evaluation if plan for intervention    #Elevated troponin  #CAD  #HLD  - Troponin and CKMB flat, patient without chest pain, ECG on admission without acute ischemic changes, elevation not secondary to ACS  - Recent LHC 12/9/19 with no significant restenosis of prior stents and no lesions suitable for intervention, would not benefit from repeat catheterization at this time  - Continue home Plavix and simvastatin    #Macrocytic anemia  - Evidence of iron deficiency on recent studies  - On daily iron supplementation, would continue to address anemia    #History of HTN  - Not currently on antihypertensives      Jaja Tran MD  Cardiology Fellow  [phone]  All Cardiology service information can be found 24/7 on [email or web address], password: [email or web address]

## 2019-12-19 NOTE — PROGRESS NOTE ADULT - SUBJECTIVE AND OBJECTIVE BOX
Interval History: s/p thrombin injection of R femoral pseudoaneurysm last night, repeat US without residual flow. Patient feels well, awaiting rehab placement.     Review Of Systems:  Constitutional: [ ] Fever [ ] Chills [ ] Fatigue [ ] Weight change   HEENT: [ ] Blurred vision [ ] Eye Pain [ ] Headache [ ] Runny nose [ ] Sore Throat   Respiratory: [ ] Cough [ ] Wheezing [x] Shortness of breath  Cardiovascular: [ ] Chest Pain [ ] Palpitations [ ] ESTRELLA [ ] PND [ ] Orthopnea  Gastrointestinal: [ ] Abdominal Pain [ ] Diarrhea [ ] Constipation [ ] Hemorrhoids [ ] Nausea [ ] Vomiting  Genitourinary: [ ] Nocturia [ ] Dysuria [ ] Incontinence  Extremities: [ ] Swelling [ ] Joint Pain  Neurologic: [ ] Focal deficit [ ] Paresthesias [ ] Syncope  Lymphatic: [ ] Swelling [ ] Lymphadenopathy   Skin: [ ] Rash [ ] Ecchymoses [ ] Wounds [ ] Lesions  Psychiatry: [ ] Depression [ ] Suicidal/Homicidal Ideation [ ] Anxiety [ ] Sleep Disturbances  [x] 10 point review of systems is otherwise negative except as mentioned above    Medications:  acetaminophen   Tablet .. 650 milliGRAM(s) Oral every 6 hours PRN  ALBUTerol    90 MICROgram(s) HFA Inhaler 2 Puff(s) Inhalation every 6 hours  ALPRAZolam 0.25 milliGRAM(s) Oral three times a day PRN  clopidogrel Tablet 75 milliGRAM(s) Oral daily  escitalopram 5 milliGRAM(s) Oral daily  ferrous    sulfate 325 milliGRAM(s) Oral daily  finasteride 5 milliGRAM(s) Oral daily  levothyroxine 50 MICROGram(s) Oral daily  melatonin 3 milliGRAM(s) Oral at bedtime  montelukast 10 milliGRAM(s) Oral daily  pantoprazole    Tablet 40 milliGRAM(s) Oral before breakfast  phenytoin   Capsule 100 milliGRAM(s) Oral two times a day  phenytoin   Chewable 50 milliGRAM(s) Chew daily  predniSONE   Tablet 5 milliGRAM(s) Oral daily  senna 1 Tablet(s) Oral at bedtime  simvastatin 40 milliGRAM(s) Oral at bedtime  sodium chloride 0.65% Nasal 1 Spray(s) Both Nostrils three times a day PRN  tamsulosin 0.4 milliGRAM(s) Oral at bedtime  thrombin Topical Powder 5000 International Unit(s) Topical once    Vitals:  ICU Vital Signs Last 24 Hrs  T(C): 36.4 (19 Dec 2019 04:57), Max: 36.6 (18 Dec 2019 22:00)  T(F): 97.6 (19 Dec 2019 04:57), Max: 97.8 (18 Dec 2019 22:00)  HR: 77 (19 Dec 2019 07:35) (70 - 88)  BP: 112/65 (19 Dec 2019 04:57) (112/65 - 141/68)  BP(mean): --  ABP: --  ABP(mean): --  RR: 17 (19 Dec 2019 04:57) (17 - 18)  SpO2: 96% (19 Dec 2019 07:35) (96% - 99%)    Daily     Daily   I&O's Summary    18 Dec 2019 07:01  -  19 Dec 2019 07:00  --------------------------------------------------------  IN: 880 mL / OUT: 500 mL / NET: 380 mL    19 Dec 2019 07:01  -  19 Dec 2019 10:17  --------------------------------------------------------  IN: 240 mL / OUT: 350 mL / NET: -110 mL    Physical Exam:  Appearance:  NAD, wearing home NC, no increased work of breathing  HENT: NC/AT  Cardiovascular: Normal S1, slightly diminished S2, mid decrescendo III/VI systolic murmur over upper sternal border, no LE Edema Present  Respiratory: Clear to auscultation bilaterally, mild crackles anteriorly that clears  Gastrointestinal: Soft  Musculoskeletal: R groin ecchymoses with swelling, not pulsatile, peripheral pulses intact  Psychiatry: [x] AAOx3  [x] Follows Commands  Skin: Intact    Labs:                        8.7    3.62  )-----------( 119      ( 19 Dec 2019 05:45 )             27.6     12-19    140  |  99  |  12  ----------------------------<  74  4.2   |  31  |  0.67    Ca    9.0      19 Dec 2019 05:45  Phos  2.7     12-19  Mg     1.9     12-19    TPro  6.1  /  Alb  2.8<L>  /  TBili  0.3  /  DBili  x   /  AST  43<H>  /  ALT  41  /  AlkPhos  214<H>  12-18    PT/INR - ( 18 Dec 2019 05:12 )   PT: 12.5 SEC;   INR: 1.12          PTT - ( 18 Dec 2019 05:12 )  PTT:30.6 SEC  CARDIAC MARKERS ( 19 Dec 2019 05:45 )  x     / x     / 51 u/L / 6.56 ng/mL / x      CARDIAC MARKERS ( 18 Dec 2019 05:12 )  x     / x     / 58 u/L / 7.01 ng/mL / x      CARDIAC MARKERS ( 17 Dec 2019 23:40 )  x     / x     / 61 u/L / 7.93 ng/mL / x      CARDIAC MARKERS ( 17 Dec 2019 16:30 )  .936 ng/mL / x     / x     / x     / x      CARDIAC MARKERS ( 17 Dec 2019 13:40 )  .858 ng/mL / x     / x     / x     / x      CARDIAC MARKERS ( 17 Dec 2019 12:14 )  .697 ng/mL / x     / x     / x     / x        Interpretation of Telemetry: sinus rhythm

## 2019-12-19 NOTE — PROGRESS NOTE ADULT - SUBJECTIVE AND OBJECTIVE BOX
Vascular Surgery Progress Note     Patient seen and examined at the bedside. Patient underwent thrombin injection of R fem artery PSA yesterday and tolerated the procedure well. Feeling well this morning. Denies any pain, numbness, or tingling or weakness in the RLE.     VITALS  T(C): 36.4 (12-19-19 @ 04:57), Max: 36.6 (12-18-19 @ 22:00)  HR: 77 (12-19-19 @ 07:35) (70 - 88)  BP: 112/65 (12-19-19 @ 04:57) (112/65 - 141/68)  RR: 17 (12-19-19 @ 04:57) (17 - 18)  SpO2: 96% (12-19-19 @ 07:35) (96% - 99%)  CAPILLARY BLOOD GLUCOSE        Is/Os    12-18 @ 07:01  -  12-19 @ 07:00  --------------------------------------------------------  IN:    Oral Fluid: 880 mL  Total IN: 880 mL    OUT:    Voided: 500 mL  Total OUT: 500 mL    Total NET: 380 mL        PHYSICAL EXAM:  General: NAD, Lying in bed comfortably, alert, oriented x3  Pulm: Non-labored breathing on RA  Vasc/Ext: Extremities: 1+ edema of RLE, LLE nonedematous, sensation and movement grossly intact bilaterally, right femoral pulsatile bulge appreciated in the R groin, no overlying erythema or skin breakdown, DP and PT pulses bilaterally, no skins breakdown through either LE, residual ecchymosis in medial R thigh and dependent area of lateral R thigh, no tenderness to palpation throughout either LE    LABS  CBC (12-19 @ 05:45)                              8.7<L>                         3.62<L>  )----------------(  119<L>     --    % Neutrophils, --    % Lymphocytes, ANC: --                                  27.6<L>  CBC (12-18 @ 05:12)                              8.4<L>                         4.44    )----------------(  123<L>     77.4<H>% Neutrophils, 9.9<L>% Lymphocytes, ANC: 3.44                                27.0<L>    BMP (12-19 @ 05:45)             140     |  99      |  12    		Ca++ --      Ca 9.0                ---------------------------------( 74    		Mg 1.9                4.2     |  31      |  0.67  			Ph 2.7     BMP (12-18 @ 05:12)             138     |  100     |  16    		Ca++ --      Ca 8.9                ---------------------------------( 74    		Mg 1.9                4.6     |  29      |  0.71  			Ph 2.3<L>    LFTs (12-18 @ 05:12)      TPro 6.1 / Alb 2.8<L> / TBili 0.3 / DBili -- / AST 43<H> / ALT 41 / AlkPhos 214<H>    Coags (12-18 @ 05:12)  aPTT 30.6 / INR 1.12 / PT 12.5    Cardiac Markers (12-19 @ 05:45)     HSTrop: 314 / CKMB: 6.56 / CK: 51  Cardiac Markers (12-18 @ 05:12)     HSTrop: 306 / CKMB: 7.01 / CK: 58  Cardiac Markers (12-17 @ 23:40)     HSTrop: 249 / CKMB: 7.93 / CK: 61        IMAGING STUDIES

## 2019-12-26 NOTE — H&P ADULT - NSICDXPASTMEDICALHX_GEN_ALL_CORE_FT
PAST MEDICAL HISTORY:  Abnormal LFTs     Advanced COPD     Anemia, unspecified type     Anxiety     Anxiety     Aortic valve stenosis, etiology of cardiac valve disease unspecified     Asthma     BCC (basal cell carcinoma) skin    BPH (benign prostatic hyperplasia)     Cerebrovascular accident (CVA)     Cerebrovascular accident (CVA) due to embolism of cerebral artery     Chronic allergic rhinitis     Coronary artery disease s/p 3 stents on June 30, 2017 at [place] Dr. Lavelle Reid    Esophagus cancer     Former smoker, stopped smoking in distant past     Herpes zoster without complication     HTN (hypertension)     Hyperlipidemia     Hypothyroidism, unspecified type     Inguinal hernia     OA (osteoarthritis) of knee right    SOFIA (obstructive sleep apnea)     Pulmonary nodule     Rheumatoid arthritis     Seizure disorder

## 2019-12-26 NOTE — H&P ADULT - NSHPREVIEWOFSYSTEMS_GEN_ALL_CORE
REVIEW OF SYSTEMS:    CONSTITUTIONAL: No weakness, fevers or chills  EYES/ENT: No visual changes;  No vertigo or throat pain   NECK: No pain or stiffness  RESPIRATORY: + congestion, no cough, wheezing, hemoptysis; baseline shortness of breath   CARDIOVASCULAR: No chest pain or palpitations  GASTROINTESTINAL: No abdominal pain; nausea, vomiting;  diarrhea or constipation. No hematemesis, melena or hematochezia. progress dysphagia x3d  GENITOURINARY: No dysuria, frequency or hematuria  NEUROLOGICAL: No numbness, RLE weakness  MUSCULOSKELETAL: R hip pain  SKIN: No itching, burning, rashes, or lesions   All other review of systems is negative unless indicated above.

## 2019-12-26 NOTE — H&P ADULT - ASSESSMENT
84M presents with 3d hx of progressive dysphagia; He can tolerate sips of clears and took all of his medications yesterday including plavix.  He has PMH esophageal cancer s/p esophagectomy (2017), CAD s/p stents, severe AS with recent cath, HTN, HLD, COPD on home O2 as needed, RA, CVA in the past, recent fall and R acetabular fracture that does not require intervention. Pt also s/p recent with cardiac cath complicated by R femoral artery pseudoaneurysm and thrombin injection.

## 2019-12-26 NOTE — H&P ADULT - HISTORY OF PRESENT ILLNESS
84M presents with 3d hx of progressive dysphagia; He can tolerate sips of clears and took all of his medications yesterday including plavix.  He has PMH esophageal cancer s/p esophagectomy (2017), CAD s/p stents, severe AS with recent cath, HTN, HLD, COPD on home O2 as needed, RA, CVA in the past, and recent admission for R femoral pseudoaneurysm discovered after a fall 12/17/19.  Patient was found to have Inferior pubic rami fracture w/ Right acetabular fracture. Pt seen by Orthopedics and no surgical intervention was indicated. Pt also had recent cardiac cath on 12/9 and developed ecchymosis of R lateral hip and groin, found to have large right femoral artery pseudoaneurysm resolved after thrombin injection.

## 2019-12-26 NOTE — PATIENT PROFILE ADULT - NSPROHMSYMPCOND_GEN_A_NUR
prn home oxygen, cpap nebs, advanced copd, anxiety, HTN, bph. CAD, Esophagus cancer, Osteoarthritis knee, asthma, allergic rhinitis, CVA, aortic valve stenosis, seizure disorder, herpes zoster without complication, abnormal LFTs, inguinal hernia./none

## 2019-12-26 NOTE — H&P ADULT - NSICDXPASTSURGICALHX_GEN_ALL_CORE_FT
PAST SURGICAL HISTORY:  Anastomotic leak following esophagectomy [place] esophagectomy    H/O heart artery stent June 30, 2017    History of hernia repair     History of tonsillectomy     History of total hip replacement left    Knee arthropathy left

## 2019-12-26 NOTE — PROVIDER CONTACT NOTE (OTHER) - ASSESSMENT
on tele monitor pt having runs of second degree type one heart block ,pt pulse is 66, pt A&Ox4, sitting in bed no signs of SOB or chest pain

## 2019-12-27 NOTE — PROVIDER CONTACT NOTE (CRITICAL VALUE NOTIFICATION) - BACKGROUND
pt admitted for dysphagia, pending ekg today 12/27 pt admitted for dysphagia, pending egd today 12/27

## 2019-12-27 NOTE — PROVIDER CONTACT NOTE (CRITICAL VALUE NOTIFICATION) - SITUATION
85 y/o male s/p EGD + balloon dilation 12/27. Patient currently NPO. Fingerstick checked and found to be 52. Repeat resulted 77.

## 2019-12-27 NOTE — PROGRESS NOTE ADULT - SUBJECTIVE AND OBJECTIVE BOX
MEJIA LATIF                     MRN-[id number]    HPI:  84M presents with 3d hx of progressive dysphagia; He can tolerate sips of clears and took all of his medications yesterday including plavix.  He has PMH esophageal cancer s/p esophagectomy (2017), CAD s/p stents, severe AS with recent cath, HTN, HLD, COPD on home O2 as needed, RA, CVA in the past, and recent admission for R femoral pseudoaneurysm discovered after a fall 12/17/19.  Patient was found to have Inferior pubic rami fracture w/ Right acetabular fracture. Pt seen by Orthopedics and no surgical intervention was indicated. Pt also had recent cardiac cath on 12/9 and developed ecchymosis of R lateral hip and groin, found to have large right femoral artery pseudoaneurysm resolved after thrombin injection. (26 Dec 2019 14:24)      Procedure: EGD 27-Dec-2019 11:35:33 EGD balloon dilation, removal of foregin body    Issues:  Respiratory insufficiency   Esophagus cancer  Dysphagia  CVA  Seizure disorder  Advanced COPD  Coronary artery disease: s/p 3 stents on June 30, 2017  Mainutrition  Anxiety        PAST MEDICAL & SURGICAL HISTORY:  Inguinal hernia  Anxiety  Abnormal LFTs  Cerebrovascular accident (CVA) due to embolism of cerebral artery  Herpes zoster without complication  Anemia, unspecified type  SOFIA (obstructive sleep apnea)  Hypothyroidism, unspecified type  Pulmonary nodule  Aortic valve stenosis, etiology of cardiac valve disease unspecified  Advanced COPD  Coronary artery disease: s/p 3 stents on June 30, 2017 at [place] Dr. Lavelle Reid  OA (osteoarthritis) of knee: right  Former smoker, stopped smoking in distant past  Rheumatoid arthritis  Seizure disorder  Asthma  Hyperlipidemia  BPH (benign prostatic hyperplasia)  HTN (hypertension)  Esophagus cancer  Chronic allergic rhinitis  BCC (basal cell carcinoma): skin  Cerebrovascular accident (CVA)  Anxiety  Anastomotic leak following esophagectomy: [place] [facility] esophagectomy  History of tonsillectomy  H/O heart artery stent: June 30, 2017  Knee arthropathy: left  History of total hip replacement: left  History of hernia repair            VITAL SIGNS:  Vital Signs Last 24 Hrs  T(C): 36 (27 Dec 2019 12:40), Max: 36.4 (26 Dec 2019 16:24)  T(F): 96.8 (27 Dec 2019 12:40), Max: 97.5 (26 Dec 2019 16:24)  HR: 70 (27 Dec 2019 14:40) (64 - 82)  BP: 114/50 (27 Dec 2019 14:40) (104/55 - 120/55)  BP(mean): 65 (27 Dec 2019 14:40) (58 - 67)  RR: 13 (27 Dec 2019 14:40) (10 - 19)  SpO2: 100% (27 Dec 2019 14:40) (98% - 100%)    I/Os:   I&O's Detail    26 Dec 2019 07:01  -  27 Dec 2019 07:00  --------------------------------------------------------  IN:    dextrose 5% + sodium chloride 0.45%: 1125 mL  Total IN: 1125 mL    OUT:    Voided: 450 mL  Total OUT: 450 mL    Total NET: 675 mL      27 Dec 2019 07:01  -  27 Dec 2019 15:06  --------------------------------------------------------  IN:    dextrose 5% + sodium chloride 0.45%: 300 mL    IV PiggyBack: 60 mL  Total IN: 360 mL    OUT:    Voided: 175 mL  Total OUT: 175 mL    Total NET: 185 mL          CAPILLARY BLOOD GLUCOSE      POCT Blood Glucose.: 96 mg/dL (27 Dec 2019 06:05)  POCT Blood Glucose.: 115 mg/dL (27 Dec 2019 00:20)  POCT Blood Glucose.: 156 mg/dL (26 Dec 2019 17:05)      =======================MEDICATIONS===================  MEDICATIONS  (STANDING):  dextrose 5% + sodium chloride 0.45% 1000 milliLiter(s) (75 mL/Hr) IV Continuous <Continuous>  escitalopram 5 milliGRAM(s) Oral daily  heparin  Injectable 5000 Unit(s) SubCutaneous every 8 hours  levothyroxine Injectable 40 MICROGram(s) IV Push at bedtime  pantoprazole  Injectable 40 milliGRAM(s) IV Push daily  phenytoin   Capsule 100 milliGRAM(s) Oral two times a day  phenytoin   Chewable 50 milliGRAM(s) Chew daily  predniSONE   Tablet 5 milliGRAM(s) Oral daily    MEDICATIONS  (PRN):  ALPRAZolam 0.25 milliGRAM(s) Oral three times a day PRN anxious      PHYSICAL EXAM============================  General:                         Awake, alert, not in any distress, On BiPAP  Neuro:                            Moving all extremities to commands.   Respiratory:	Air entry fair and  bilateral conducted sounds                                           Effort even and unlabored.  CV:		Regular rate and rhythm. Normal S1/S2                                          Distal pulses present.  Abdomen:	                     Soft, non-distended. Bowel sounds present   Skin:		No rash.  Extremities:	Warm, no cyanosis or edema.  Palpable pulses    ============================LABS=========================                        8.1    4.01  )-----------( 190      ( 27 Dec 2019 06:06 )             26.6     12-27    141  |  101  |  11  ----------------------------<  90  4.6   |  32<H>  |  0.65    Ca    8.8      27 Dec 2019 08:12        PT/INR - ( 26 Dec 2019 15:25 )   PT: 11.9 SEC;   INR: 1.04          PTT - ( 26 Dec 2019 15:25 )  PTT:29.4 SEC  ABG - ( 27 Dec 2019 14:00 )  pH, Arterial: 7.25  pH, Blood: x     /  pCO2: 81    /  pO2: 109   / HCO3: 30    / Base Excess: 7.4   /  SaO2: 97.5        ============================IMAGING STUDIES=========================  < from: Xray Chest 1 View- PORTABLE-Urgent (12.27.19 @ 12:57) >  INTERPRETATION:     Patient's chin obscures the apices. No complicating pneumomediastinum or pneumothorax visualized. Small layering right effusion and to a lesser extent the left effusion is present. Air in a small hiatal hernia is present.      COMPARISON:  December 14      IMPRESSION:  Status post endoscopy and balloon dilation without complications.      A/P:    =============================NEUROLOGY============================  H/O Seizure disorder , No recent Sz activity, will restart Dilantin   ==============================RESPIRATORY========================  Resp insufficiency with advanced COPD, required BiPAP post op,   Transition to CPAP. Continuous pulse ox monitoring.   Using incentive spirometry 	  pulmonary toilet    ============================CARDIOVASCULAR======================  Continue hemodynamic monitoring.  Not on any pressors  Restart Plavix post op  =====================RENAL===================  Continue IVF  Monitor I/Os and electrolytes    ====================GASTROINTESTINAL===================  S/P EGD,  balloon dilation    Start clears as tolerated.   Continue Zofran / Reglan for nausea - PRN	    ========================HEMATOLOGIC/ONCOLOGIC====================  No signs of active bleeding.   Follow CBC in AM    ============================INFECTIOUS DISEASE========================  Monitor for fever / leukocytosis.  Pt is on GI & DVT prophylaxis  OOB & ambulate       Pertinent clinical, laboratory, radiographic, hemodynamic, echocardiographic, respiratory data, microbiologic data and chart were reviewed and analyzed frequently throughout the course of the day and night  Patient seen, examined and plan discussed with CT Surgery / CTICU team during rounds.    Pt's status discussed with family at bedside, updated status        Nhi Shepard DO FACEP MEJIA LATIF                     [id number]    HPI:  84M presents with 3d hx of progressive dysphagia; He can tolerate sips of clears and took all of his medications yesterday including plavix.  He has PMH esophageal cancer s/p esophagectomy (2017), CAD s/p stents, severe AS with recent cath, HTN, HLD, COPD on home O2 as needed, RA, CVA in the past, and recent admission for R femoral pseudoaneurysm discovered after a fall 12/17/19.  Patient was found to have Inferior pubic rami fracture w/ Right acetabular fracture. Pt seen by Orthopedics and no surgical intervention was indicated. Pt also had recent cardiac cath on 12/9 and developed ecchymosis of R lateral hip and groin, found to have large right femoral artery pseudoaneurysm resolved after thrombin injection. (26 Dec 2019 14:24)      Procedure: EGD 27-Dec-2019 11:35:33 EGD balloon dilation, removal of foregin body    Issues:  Respiratory insufficiency   Esophagus cancer  Dysphagia  CVA  Seizure disorder  Advanced COPD  Coronary artery disease: s/p 3 stents on June 30, 2017  Mainutrition  Anxiety        PAST MEDICAL & SURGICAL HISTORY:  Inguinal hernia  Anxiety  Abnormal LFTs  Cerebrovascular accident (CVA) due to embolism of cerebral artery  Herpes zoster without complication  Anemia, unspecified type  OSFIA (obstructive sleep apnea)  Hypothyroidism, unspecified type  Pulmonary nodule  Aortic valve stenosis, etiology of cardiac valve disease unspecified  Advanced COPD  Coronary artery disease: s/p 3 stents on June 30, 2017 at [place] Dr. Lavelle Reid  OA (osteoarthritis) of knee: right  Former smoker, stopped smoking in distant past  Rheumatoid arthritis  Seizure disorder  Asthma  Hyperlipidemia  BPH (benign prostatic hyperplasia)  HTN (hypertension)  Esophagus cancer  Chronic allergic rhinitis  BCC (basal cell carcinoma): skin  Cerebrovascular accident (CVA)  Anxiety  Anastomotic leak following esophagectomy: [place] [facility] esophagectomy  History of tonsillectomy  H/O heart artery stent: June 30, 2017  Knee arthropathy: left  History of total hip replacement: left  History of hernia repair            VITAL SIGNS:  Vital Signs Last 24 Hrs  T(C): 36 (27 Dec 2019 12:40), Max: 36.4 (26 Dec 2019 16:24)  T(F): 96.8 (27 Dec 2019 12:40), Max: 97.5 (26 Dec 2019 16:24)  HR: 70 (27 Dec 2019 14:40) (64 - 82)  BP: 114/50 (27 Dec 2019 14:40) (104/55 - 120/55)  BP(mean): 65 (27 Dec 2019 14:40) (58 - 67)  RR: 13 (27 Dec 2019 14:40) (10 - 19)  SpO2: 100% (27 Dec 2019 14:40) (98% - 100%)    I/Os:   I&O's Detail    26 Dec 2019 07:01  -  27 Dec 2019 07:00  --------------------------------------------------------  IN:    dextrose 5% + sodium chloride 0.45%: 1125 mL  Total IN: 1125 mL    OUT:    Voided: 450 mL  Total OUT: 450 mL    Total NET: 675 mL      27 Dec 2019 07:01  -  27 Dec 2019 15:06  --------------------------------------------------------  IN:    dextrose 5% + sodium chloride 0.45%: 300 mL    IV PiggyBack: 60 mL  Total IN: 360 mL    OUT:    Voided: 175 mL  Total OUT: 175 mL    Total NET: 185 mL          CAPILLARY BLOOD GLUCOSE      POCT Blood Glucose.: 96 mg/dL (27 Dec 2019 06:05)  POCT Blood Glucose.: 115 mg/dL (27 Dec 2019 00:20)  POCT Blood Glucose.: 156 mg/dL (26 Dec 2019 17:05)      =======================MEDICATIONS===================  MEDICATIONS  (STANDING):  dextrose 5% + sodium chloride 0.45% 1000 milliLiter(s) (75 mL/Hr) IV Continuous <Continuous>  escitalopram 5 milliGRAM(s) Oral daily  heparin  Injectable 5000 Unit(s) SubCutaneous every 8 hours  levothyroxine Injectable 40 MICROGram(s) IV Push at bedtime  pantoprazole  Injectable 40 milliGRAM(s) IV Push daily  phenytoin   Capsule 100 milliGRAM(s) Oral two times a day  phenytoin   Chewable 50 milliGRAM(s) Chew daily  predniSONE   Tablet 5 milliGRAM(s) Oral daily    MEDICATIONS  (PRN):  ALPRAZolam 0.25 milliGRAM(s) Oral three times a day PRN anxious      PHYSICAL EXAM============================  General:                         Awake, alert, not in any distress, On BiPAP  Neuro:                            Moving all extremities to commands.   Respiratory:	Air entry fair and  bilateral conducted sounds                                           Effort even and unlabored.  CV:		Regular rate and rhythm. Normal S1/S2                                          Distal pulses present.  Abdomen:	                     Soft, non-distended. Bowel sounds present   Skin:		No rash.  Extremities:	Warm, no cyanosis or edema.  Palpable pulses    ============================LABS=========================                        8.1    4.01  )-----------( 190      ( 27 Dec 2019 06:06 )             26.6     12-27    141  |  101  |  11  ----------------------------<  90  4.6   |  32<H>  |  0.65    Ca    8.8      27 Dec 2019 08:12        PT/INR - ( 26 Dec 2019 15:25 )   PT: 11.9 SEC;   INR: 1.04          PTT - ( 26 Dec 2019 15:25 )  PTT:29.4 SEC  ABG - ( 27 Dec 2019 14:00 )  pH, Arterial: 7.25  pH, Blood: x     /  pCO2: 81    /  pO2: 109   / HCO3: 30    / Base Excess: 7.4   /  SaO2: 97.5        ============================IMAGING STUDIES=========================  < from: Xray Chest 1 View- PORTABLE-Urgent (12.27.19 @ 12:57) >  INTERPRETATION:     Patient's chin obscures the apices. No complicating pneumomediastinum or pneumothorax visualized. Small layering right effusion and to a lesser extent the left effusion is present. Air in a small hiatal hernia is present.      COMPARISON:  December 14      IMPRESSION:  Status post endoscopy and balloon dilation without complications.      A/P:    =============================NEUROLOGY============================  H/O Seizure disorder , No recent Sz activity, will restart Dilantin   ==============================RESPIRATORY========================  Resp insufficiency with advanced COPD, required BiPAP post op,   Transition to CPAP. Continuous pulse ox monitoring.   Using incentive spirometry 	  pulmonary toilet    ============================CARDIOVASCULAR======================  Continue hemodynamic monitoring.  Not on any pressors  Restart Plavix post op  =====================RENAL===================  Continue IVF  Monitor I/Os and electrolytes    ====================GASTROINTESTINAL===================  S/P EGD,  balloon dilation    Start clears as tolerated.   Continue Zofran / Reglan for nausea - PRN	    ========================HEMATOLOGIC/ONCOLOGIC====================  No signs of active bleeding.   Follow CBC in AM    ============================INFECTIOUS DISEASE========================  Monitor for fever / leukocytosis.  Pt is on GI & DVT prophylaxis  OOB & ambulate       Pertinent clinical, laboratory, radiographic, hemodynamic, echocardiographic, respiratory data, microbiologic data and chart were reviewed and analyzed frequently throughout the course of the day and night  Patient seen, examined and plan discussed with CT Surgery / CTICU team during rounds.    Pt's status discussed with family at bedside, updated status    I spent 35 minutes at bedside providing critical care services post op today.    Nhi MONTGOMERYP

## 2019-12-27 NOTE — PROVIDER CONTACT NOTE (CRITICAL VALUE NOTIFICATION) - ASSESSMENT
Patient on BIPAP for CO2 removal. Found to be rousable but somnolent. Differential between hypercapnia RN ruled out hypoglycemia due to NPO status. Fingerstick checked and found to be 52. Repeat resulted 77.

## 2019-12-28 NOTE — PHYSICAL THERAPY INITIAL EVALUATION ADULT - GENERAL OBSERVATIONS, REHAB EVAL
Patient found supine in bed head of bed elevated in NAD, agreeable to evaluation +supplemental O2 via NC+tele+IV

## 2019-12-28 NOTE — PHYSICAL THERAPY INITIAL EVALUATION ADULT - ADDITIONAL COMMENTS
Patient reports he could walk about 200ft with rolling walker prior to admission, breathing/endurance would be his limiting factor, not LE weakness

## 2019-12-28 NOTE — PROGRESS NOTE ADULT - SUBJECTIVE AND OBJECTIVE BOX
POST ANESTHESIA EVALUATION    84y Male POSTOP DAY 1 S/P EGD    MENTAL STATUS: Patient participation [  x] Awake     [  ] Arousable     [  ] Sedated    AIRWAY PATENCY: [x  ] Satisfactory  [  ] Other:     Vital Signs Last 24 Hrs  T(C): 36.3 (28 Dec 2019 09:00), Max: 36.4 (28 Dec 2019 00:00)  T(F): 97.4 (28 Dec 2019 09:00), Max: 97.6 (28 Dec 2019 00:00)  HR: 58 (28 Dec 2019 09:00) (51 - 82)  BP: 109/53 (28 Dec 2019 09:00) (90/47 - 121/44)  BP(mean): 65 (28 Dec 2019 09:00) (57 - 71)  RR: 16 (28 Dec 2019 09:00) (10 - 19)  SpO2: 100% (28 Dec 2019 09:00) (97% - 100%)  I&O's Summary    27 Dec 2019 07:01  -  28 Dec 2019 07:00  --------------------------------------------------------  IN: 2435 mL / OUT: 1150 mL / NET: 1285 mL    28 Dec 2019 07:01  -  28 Dec 2019 10:42  --------------------------------------------------------  IN: 0 mL / OUT: 320 mL / NET: -320 mL          NAUSEA/ VOMITTING:  [  x] NONE  [  ] CONTROLLED [  ] OTHER     PAIN: [x  ] CONTROLLED WITH CURRENT REGIMEN  [  ] OTHER    [ x ] NO APPARENT ANESTHESIA COMPLICATIONS      Comments:

## 2019-12-28 NOTE — PHYSICAL THERAPY INITIAL EVALUATION ADULT - DISCHARGE DISPOSITION, PT EVAL
anticipate Home with skilled home PT for gait training, transfer training, strengthening, and to increase independence within the home

## 2019-12-28 NOTE — PHYSICAL THERAPY INITIAL EVALUATION ADULT - GAIT DEVIATIONS NOTED, PT EVAL
increased time in double stance/decreased swing-to-stance ratio/decreased henry/decreased weight-shifting ability/decreased velocity of limb motion/decreased step length/decreased stride length

## 2019-12-28 NOTE — PHYSICAL THERAPY INITIAL EVALUATION ADULT - CRITERIA FOR SKILLED THERAPEUTIC INTERVENTIONS
predicted duration of therapy intervention/rehab potential/impairments found/therapy frequency/anticipated discharge recommendation/functional limitations in following categories/risk reduction/prevention

## 2019-12-28 NOTE — PROGRESS NOTE ADULT - SUBJECTIVE AND OBJECTIVE BOX
MEJIA LATIF                     MRN-[id number]    HPI:  84M presents with 3d hx of progressive dysphagia; He can tolerate sips of clears and took all of his medications yesterday including plavix.  He has PMH esophageal cancer s/p esophagectomy (2017), CAD s/p stents, severe AS with recent cath, HTN, HLD, COPD on home O2 as needed, RA, CVA in the past, and recent admission for R femoral pseudoaneurysm discovered after a fall 12/17/19.  Patient was found to have Inferior pubic rami fracture w/ Right acetabular fracture. Pt seen by Orthopedics and no surgical intervention was indicated. Pt also had recent cardiac cath on 12/9 and developed ecchymosis of R lateral hip and groin, found to have large right femoral artery pseudoaneurysm resolved after thrombin injection. (26 Dec 2019 14:24)      Procedure: EGD 27-Dec-2019 11:35:33 EGD balloon dilation, removal of foregin body    Issues:  Respiratory insufficiency /failure, hypercapnia, on BiPAP  Esophagus cancer  Dysphagia  CVA  Seizure disorder  Advanced COPD  Coronary artery disease: s/p 3 stents on June 30, 2017  Mainutrition  Anxiety      PAST MEDICAL & SURGICAL HISTORY:  Inguinal hernia  Anxiety  Abnormal LFTs  Cerebrovascular accident (CVA) due to embolism of cerebral artery  Herpes zoster without complication  Anemia, unspecified type  SOFIA (obstructive sleep apnea)  Hypothyroidism, unspecified type  Pulmonary nodule  Aortic valve stenosis, etiology of cardiac valve disease unspecified  Advanced COPD  Coronary artery disease: s/p 3 stents on June 30, 2017 at [facility] Dr. Lavelle Reid  OA (osteoarthritis) of knee: right  Former smoker, stopped smoking in distant past  Rheumatoid arthritis  Seizure disorder  Asthma  Hyperlipidemia  BPH (benign prostatic hyperplasia)  HTN (hypertension)  Esophagus cancer  Chronic allergic rhinitis  BCC (basal cell carcinoma): skin  Cerebrovascular accident (CVA)  Anxiety  Anastomotic leak following esophagectomy: [place] log[place] esophagectomy  History of tonsillectomy  H/O heart artery stent: June 30, 2017  Knee arthropathy: left  History of total hip replacement: left  History of hernia repair            VITAL SIGNS:  Vital Signs Last 24 Hrs  T(C): 36.4 (28 Dec 2019 04:00), Max: 36.4 (28 Dec 2019 00:00)  T(F): 97.6 (28 Dec 2019 04:00), Max: 97.6 (28 Dec 2019 00:00)  HR: 53 (28 Dec 2019 06:44) (53 - 82)  BP: 111/52 (28 Dec 2019 06:00) (90/47 - 121/44)  BP(mean): 65 (28 Dec 2019 06:00) (57 - 71)  RR: 16 (28 Dec 2019 06:00) (10 - 19)  SpO2: 100% (28 Dec 2019 06:44) (97% - 100%)    I/Os:   I&O's Detail    26 Dec 2019 07:01  -  27 Dec 2019 07:00  --------------------------------------------------------  IN:    dextrose 5% + sodium chloride 0.45%: 1125 mL  Total IN: 1125 mL    OUT:    Voided: 450 mL  Total OUT: 450 mL    Total NET: 675 mL      27 Dec 2019 07:01  -  28 Dec 2019 06:58  --------------------------------------------------------  IN:    dextrose 5% + sodium chloride 0.45%: 300 mL    dextrose 5% + sodium chloride 0.45%.: 100 mL    dextrose 5%.: 1125 mL    IV PiggyBack: 910 mL  Total IN: 2435 mL    OUT:    Voided: 850 mL  Total OUT: 850 mL    Total NET: 1585 mL          CAPILLARY BLOOD GLUCOSE      POCT Blood Glucose.: 78 mg/dL (28 Dec 2019 05:09)  POCT Blood Glucose.: 64 mg/dL (28 Dec 2019 05:08)  POCT Blood Glucose.: 80 mg/dL (27 Dec 2019 23:05)  POCT Blood Glucose.: 94 mg/dL (27 Dec 2019 18:23)  POCT Blood Glucose.: 64 mg/dL (27 Dec 2019 18:20)  POCT Blood Glucose.: 77 mg/dL (27 Dec 2019 15:42)  POCT Blood Glucose.: 52 mg/dL (27 Dec 2019 15:39)      =======================MEDICATIONS===================  MEDICATIONS  (STANDING):  clopidogrel Tablet 75 milliGRAM(s) Oral daily  dextrose 5% + sodium chloride 0.45%. 1000 milliLiter(s) (100 mL/Hr) IV Continuous <Continuous>  escitalopram 5 milliGRAM(s) Oral daily  finasteride 5 milliGRAM(s) Oral daily  heparin  Injectable 5000 Unit(s) SubCutaneous every 8 hours  levothyroxine Injectable 40 MICROGram(s) IV Push at bedtime  montelukast 10 milliGRAM(s) Oral daily  pantoprazole  Injectable 40 milliGRAM(s) IV Push daily  phenytoin   Chewable 50 milliGRAM(s) Chew daily  phenytoin  IVPB 100 milliGRAM(s) IV Intermittent two times a day  predniSONE   Tablet 5 milliGRAM(s) Oral daily  tamsulosin 0.4 milliGRAM(s) Oral at bedtime    MEDICATIONS  (PRN):  albuterol/ipratropium for Nebulization. 3 milliLiter(s) Nebulizer every 6 hours PRN Shortness of Breath and/or Wheezing  ALPRAZolam 0.25 milliGRAM(s) Oral three times a day PRN anxious      =======================VENTILATOR SETTINGS===================    BiPAP    PHYSICAL EXAM============================  General:                        Arousable,  but sleepy , not in any distress  Neuro:                            Moving all extremities to commands.   Respiratory:	Air entry fair and  bilateral conducted sounds, on BiPAP                                           Effort even and unlabored.  CV:		Regular rate and rhythm. Normal S1/S2                                          Distal pulses present.  Abdomen:	                     Soft, non-distended. Bowel sounds present   Skin:		No rash.  Extremities:	Warm, no cyanosis or edema.  Palpable pulses    ============================LABS=========================                        7.3    2.59  )-----------( 168      ( 28 Dec 2019 05:00 )             24.0     12-28    136  |  99  |  11    ----------------------------<  84  4.2   |  29  |  0.65    Ca    8.6      28 Dec 2019 05:00  Phos  2.5     12-28  Mg     1.8     12-28        PT/INR - ( 26 Dec 2019 15:25 )   PT: 11.9 SEC;   INR: 1.04          PTT - ( 26 Dec 2019 15:25 )  PTT:29.4 SEC  ABG - ( 28 Dec 2019 05:00 )  pH, Arterial: 7.36  pH, Blood: x     /  pCO2: 65    /  pO2: 169   / HCO3: 33    / Base Excess: 9.7   /  SaO2: 99.1                  ============================IMAGING STUDIES=========================  < from: Xray Chest 1 View- PORTABLE-Urgent (12.27.19 @ 12:57) >  INTERPRETATION:     Patient's chin obscures the apices. No complicating pneumomediastinum or pneumothorax visualized. Small layering right effusion and to a lesser extent the left effusion is present. Air in a small hiatal hernia is present.      COMPARISON:  December 14      IMPRESSION:  Status post endoscopy and balloon dilation without complications.    A/P:    =============================NEUROLOGY============================  H/O Seizure disorder , No recent Sz activity, will restart Dilantin   ==============================RESPIRATORY========================  Resp insufficiency, failure now  with advanced COPD, required BiPAP post op,   Hypercapnia, continure BiPAP support    Continuous pulse ox, ABG  monitoring.   Using incentive spirometry 	  pulmonary toilet    ============================CARDIOVASCULAR======================  Continue hemodynamic monitoring.  Not on any pressors  Restart Plavix post op  =====================RENAL===================  Continue IVF  Monitor I/Os and electrolytes    ====================GASTROINTESTINAL===================  S/P EGD,  balloon dilation    Start clears as tolerated.   Continue Zofran / Reglan for nausea - PRN	    ========================HEMATOLOGIC/ONCOLOGIC====================  No signs of active bleeding.   Follow CBC in AM    ============================INFECTIOUS DISEASE========================  Monitor for fever / leukocytosis.  Pt is on GI & DVT prophylaxis  OOB & ambulate       Pertinent clinical, laboratory, radiographic, hemodynamic, echocardiographic, respiratory data, microbiologic data and chart were reviewed and analyzed frequently throughout the course of the day and night  Patient seen, examined and plan discussed with CT Surgery / CTICU team during rounds.    Pt's status discussed with family at bedside, updated status    I spent 30 minutes at bedside providing critical care services post op today.    Nhi Shepard DO FACEP

## 2019-12-28 NOTE — PROVIDER CONTACT NOTE (CRITICAL VALUE NOTIFICATION) - ACTION/TREATMENT ORDERED:
MD aware. patient to be put back on bipap. will continue to monitor.
Fingersticks ordered q6 while patient NPO; patient fluids changed from Normal Saline at 30 to D5 at 75.
will put new orders to repeat

## 2019-12-28 NOTE — PHYSICAL THERAPY INITIAL EVALUATION ADULT - PERTINENT HX OF CURRENT PROBLEM, REHAB EVAL
Patient is a 84 year old male admitted to [facility] for progressive dysphagia x 3 days. recent admission for R femoral pseudoaneurysm discovered after a fall 12/17/19.  Patient was found to have Inferior pubic rami fracture w/ Right acetabular fracture. Pt seen by Orthopedics and no surgical intervention was indicated. Pt also had recent cardiac cath on 12/9 and developed ecchymosis of R lateral hip and groin, found to have large right femoral artery pseudoaneurysm resolved after thrombin injection.

## 2019-12-28 NOTE — PHYSICAL THERAPY INITIAL EVALUATION ADULT - MANUAL MUSCLE TESTING RESULTS, REHAB EVAL
patient able to straight leg raise, heel slide, ankle pump: grossly 3+/5 throughout bilateral hips and knees

## 2019-12-28 NOTE — PROGRESS NOTE ADULT - SUBJECTIVE AND OBJECTIVE BOX
MEJIA LATIF   MRN#: [id number]     The patient is a 84y Male who was seen, evaluated, & examined with the CTICU staff post-operatively with a multidisciplinary care plan formulated & implemented.  All available clinical, laboratory, radiographic, pharmacologic, and electrocardiographic data were reviewed & analyzed.      The patient was in the CTICU in critical condition secondary to:     persistent cardiopulmonary dysfunction  hypotension    For support and evaluation & prevention of further decompensation secondary to persistent cardiopulmonary dysfunction, respiratory status required:     supplemental oxygen with BiPaP  continuous pulse oximetry monitoring  following ABGs with A-line monitoring    Continuous hemodynamic monitoring was required to ensure adequate cardiovascular support and to evaluate for & help prevent decompensation while receiving intermittent volume expansion secondary to hypotension.    In addition:  Baseline COPD/chronic retainer s/p removal of foreign body complicated by hypercarbic respiratory failure requiring Bipap  pCO2 down to 60s on Bipap 15/6 (baseline likely around 60 based on pH) but back to 80s on room air although he was mentating appropriately - will continue on Bipap overnight and reassess in AM  Intermittently hypotensive to systolic blood pressure 70s responsive to fluid resuscitation - now hemodynamically stable, can add Phenylephrine as needed  Patient likely still lethargic and vasoplegic post procedural anesthesia - will support and look for improvement with time    The patient required critical care management and I personally provided 75 minutes of non-continuous care to the patient, excluding separate procedures, in addition to  discussing the patient and plan at length with the CTICU staff and helping coordinate care.

## 2019-12-29 NOTE — PROGRESS NOTE ADULT - SUBJECTIVE AND OBJECTIVE BOX
MEJIA LATIF   MRN#: [id number]     The patient is a 84y Male who was seen, evaluated, & examined with the CTICU staff post-operatively with a multidisciplinary care plan formulated & implemented.  All available clinical, laboratory, radiographic, pharmacologic, and electrocardiographic data were reviewed & analyzed.      The patient was in the CTICU in critical condition secondary to:     persistent cardiopulmonary dysfunction  hypotension    For support and evaluation & prevention of further decompensation secondary to persistent cardiopulmonary dysfunction, respiratory status required:     supplemental oxygen with BiPaP  continuous pulse oximetry monitoring  following ABGs with A-line monitoring    Continuous hemodynamic monitoring was required to ensure adequate cardiovascular support and to evaluate for & help prevent decompensation while receiving intermittent volume expansion secondary to hypotension.    In addition:  Baseline COPD/chronic retainer s/p removal of foreign body complicated by hypercarbic respiratory failure requiring Bipap  pCO2 down to 60s on Bipap 15/6 (baseline likely around 60 based on pH) but back to 80s on room air although he was mentating appropriately   On Bipap overnight but still with elevated pCO2 and acidosis despite moving air adequately and mentating appropriately  Intermittently hypotensive to systolic blood pressure 70s responsive to fluid resuscitation - required resuscitation overnight  On Prednisone 5 as outpatient likely for RA vs. COPD - will check Cortisol but would favor giving short course of stress dose steroids - this should help blood pressure and pCO2    The patient required critical care management and I personally provided 75 minutes of non-continuous care to the patient, excluding separate procedures, in addition to  discussing the patient and plan at length with the CTICU staff and helping coordinate care.

## 2019-12-30 NOTE — DISCHARGE NOTE PROVIDER - CARE PROVIDER_API CALL
Christian Weinstein (MD)  Surgery; Thoracic Surgery  [id number] [place] [address]  Phone: [phone]  Fax: [phone]  Follow Up Time:

## 2019-12-30 NOTE — DISCHARGE NOTE NURSING/CASE MANAGEMENT/SOCIAL WORK - NSDCCRNAME_GEN_ALL_CORE_FT
[facility] [address] [place] [phone]  [facility]

## 2019-12-30 NOTE — DISCHARGE NOTE NURSING/CASE MANAGEMENT/SOCIAL WORK - PATIENT PORTAL LINK FT
You can access the FollowMyHealth Patient Portal offered by [facility] by registering at the following website: [email or web address]. By joining [facility]’s FollowMyHealth portal, you will also be able to view your health information using other applications (apps) compatible with our system.

## 2019-12-30 NOTE — DISCHARGE NOTE PROVIDER - NSDCACTIVITY_GEN_ALL_CORE
No heavy lifting/straining/Stairs allowed/Showering allowed/Do not drive or operate machinery/Walking - Outdoors allowed/Driving allowed/Walking - Indoors allowed

## 2019-12-30 NOTE — PROGRESS NOTE ADULT - SUBJECTIVE AND OBJECTIVE BOX
MEJIA LATIF  MRN-[id number]  _________________________  VITAL SIGNS:  Vital Signs Last 24 Hrs  T(C): 35.4 (30 Dec 2019 06:00), Max: 36.7 (29 Dec 2019 16:00)  T(F): 95.8 (30 Dec 2019 06:00), Max: 98 (29 Dec 2019 16:00)  HR: 73 (30 Dec 2019 06:00) (51 - 75)  BP: 122/58 (30 Dec 2019 06:00) (86/58 - 123/89)  BP(mean): 73 (30 Dec 2019 06:00) (54 - 97)  RR: 20 (30 Dec 2019 06:00) (12 - 26)  SpO2: 99% (30 Dec 2019 06:00) (95% - 100%)  I/Os:   I&O's Detail    29 Dec 2019 07:01  -  30 Dec 2019 07:00  --------------------------------------------------------  IN:    dextrose 5% + sodium chloride 0.45%.: 250 mL    Oral Fluid: 360 mL  Total IN: 610 mL    OUT:    Voided: 600 mL  Total OUT: 600 mL    Total NET: 10 mL              MEDICATIONS:  MEDICATIONS  (STANDING):  chlorhexidine 4% Liquid 1 Application(s) Topical daily  clopidogrel Tablet 75 milliGRAM(s) Oral daily  escitalopram 5 milliGRAM(s) Oral daily  finasteride 5 milliGRAM(s) Oral daily  heparin  Injectable 5000 Unit(s) SubCutaneous every 8 hours  levothyroxine 50 MICROGram(s) Oral daily  montelukast 10 milliGRAM(s) Oral daily  pantoprazole    Tablet 40 milliGRAM(s) Oral before breakfast  phenytoin   Capsule 100 milliGRAM(s) Oral every 12 hours  phenytoin   Chewable 50 milliGRAM(s) Chew daily  predniSONE   Tablet 5 milliGRAM(s) Oral daily  tamsulosin 0.4 milliGRAM(s) Oral at bedtime    MEDICATIONS  (PRN):  albuterol/ipratropium for Nebulization. 3 milliLiter(s) Nebulizer every 6 hours PRN Shortness of Breath and/or Wheezing  ALPRAZolam 0.25 milliGRAM(s) Oral three times a day PRN anxious      LABS:                        8.4    2.80  )-----------( 154      ( 30 Dec 2019 03:20 )             27.0     12-30    135  |  99  |  9   ----------------------------<  66<L>  4.4   |  30  |  0.67    Ca    9.0      30 Dec 2019 03:20  Phos  2.5     12-29  Mg     2.0     12-30              _________________________  PROCEDURE:    EGD 27-Dec-2019 11:35:33 EGD balloon dilation, removal of foregin body    ISSUES:   Respiratory insufficiency /failure, hypercapnia, on BiPAP  Esophagus cancer  Dysphagia  CVA  Seizure disorder  Advanced COPD  Coronary artery disease: s/p 3 stents on June 30, 2017  Mainutrition  Anxiety      INTERVAL EVENTS:   Remained hemodynamically stable. Stable with bipap at bedtime and O2 during day.    HISTORY:   Patient reports mild sharp pain at surgical sites which is improved with use of pain meds and worse with deep breathing and movement. There is no associated fever, cough, nausea, or vomiting.    PHYSICAL EXAM:   Gen: Comfortable, No acute distress  Eyes: Sclera white, Conjunctiva normal, Eyelids normal, Pupils symmetrical   ENT: Mucous membranes moist,  ,  ,    Neck: Trachea midline,  ,  ,  ,    CV: Rate regular, Rhythm regular,  ,    Resp: Breath sounds clear, No accessory muscles use,  ,    Abd: Soft, Non-distended, Non-tender, Bowel sounds normal,  ,    Skin: Warm, No peripheral edema of lower extremities,    : No garza  Neuro: Moving all 4 extremities,    Psych: A&Ox3      ASSESSMENT AND PLAN:     NEURO:  No pain issues.  Seizure disorder - stable. continue antiepileptics  Psych - continue psych meds                 RESPIRATORY:  Chronic resp failure - stable. Wean NC for goal O2sat above 92. Obtain CXR. Incentive spirometry. Chest PT and frequent suctioning. Continue bronchodilators. OOB to chair & ambulate w/ assistance. Continuous pulse ox for support & to prevent decompensation         COPD - stable. Continue home inhalers.               CARDIOVASCULAR:  Hemodynamically stable - Not on pressors. Continue hemodynamic monitoring.     CAD - stable. Continue aspirin and plavix.                    RENAL:  Stable - Encourage PO intake of fluids. Monitor IOs and electrolytes.         GASTROINTESTINAL:  GI prophylaxis with Protonix  Zofran and Reglan IV PRN for nausea  Regular consistency diet              HEMATOLOGIC:  No signs of active bleeding. Monitor Hgb in CBC in AM  DVT prophylaxis with heparin subQ and SCDs.      INFECTIOUS DISEASE:  All surgical sites appear clean. No signs of active infection. Will monitor for fever and leukocytosis.                           ENDOCRINE:  Stable – Monitor glucose fingersticks for goal 120-180.  Hypothyroidism - stable. Continue Synthroid.         Pertinent clinical, laboratory, radiographic, hemodynamic, echocardiographic, respiratory data, microbiologic data and chart were reviewed by myself and analyzed frequently throughout the course of the day and night by myself.    Plan discussed at length with the CTICU staff and Attending CT Surgeon.   Patient's status was discussed with patient and family at bedside.

## 2019-12-30 NOTE — DISCHARGE NOTE PROVIDER - NSDCFUADDAPPT_GEN_ALL_CORE_FT
Call Dr. Weinstein's office in 1-2 days to schedule follow up appointment. Call Dr. Weinstein's office for any questions or concerns. [phone]

## 2019-12-30 NOTE — DISCHARGE NOTE NURSING/CASE MANAGEMENT/SOCIAL WORK - NSDCFUADDAPPT_GEN_ALL_CORE_FT
Call Dr. Weinstein's office in 1-2 days to schedule follow up appointment. Call Dr. Weinstein's office for any questions or concerns. [phone]

## 2019-12-30 NOTE — DISCHARGE NOTE PROVIDER - HOSPITAL COURSE
84M presents with 3d hx of progressive dysphagia; He can tolerate sips of clears and took all of his medications yesterday including plavix.    He has PMH esophageal cancer s/p esophagectomy (2017), CAD s/p stents, severe AS with recent cath, HTN, HLD, COPD on home O2 as needed, RA, CVA in the past, and recent admission for R femoral pseudoaneurysm discovered after a fall 12/17/19.  Patient was found to have Inferior pubic rami fracture w/ Right acetabular fracture. Pt seen by Orthopedics and no surgical intervention was indicated. Pt also had recent cardiac cath on 12/9 and developed ecchymosis of R lateral hip and groin, found to have large right femoral artery pseudoaneurysm resolved after thrombin injection.     Patient underwent EGD balloon dilation food particle removed from anastomosis    Brought to CTIICU due to apneic periods following extubation, requiring BiPAP    Patient's CO2 back to baseline from interpretation of ABG, patient is not lethargic or in any respiratory distress, still continues to uses BiPAP at night    Patient will be returning back to his Rehab center in [facility]    Patient is on supplemental oxygen 3L via NC prn, ready and stable for discharge.

## 2020-01-01 ENCOUNTER — INPATIENT (INPATIENT)
Facility: HOSPITAL | Age: 85
LOS: 0 days | DRG: 951 | End: 2020-01-25
Attending: HOSPITALIST | Admitting: HOSPITALIST
Payer: OTHER MISCELLANEOUS

## 2020-01-01 ENCOUNTER — INPATIENT (INPATIENT)
Facility: HOSPITAL | Age: 85
LOS: 2 days | Discharge: HOME | DRG: 871 | End: 2020-01-24
Attending: INTERNAL MEDICINE | Admitting: INTERNAL MEDICINE
Payer: MEDICARE

## 2020-01-01 ENCOUNTER — APPOINTMENT (OUTPATIENT)
Dept: PULMONOLOGY | Facility: CLINIC | Age: 85
End: 2020-01-01
Payer: MEDICARE

## 2020-01-01 ENCOUNTER — TRANSCRIPTION ENCOUNTER (OUTPATIENT)
Age: 85
End: 2020-01-01

## 2020-01-01 VITALS
HEART RATE: 62 BPM | SYSTOLIC BLOOD PRESSURE: 110 MMHG | WEIGHT: 125 LBS | HEIGHT: 63 IN | RESPIRATION RATE: 16 BRPM | BODY MASS INDEX: 22.15 KG/M2 | DIASTOLIC BLOOD PRESSURE: 62 MMHG

## 2020-01-01 VITALS
SYSTOLIC BLOOD PRESSURE: 60 MMHG | TEMPERATURE: 97 F | HEART RATE: 62 BPM | DIASTOLIC BLOOD PRESSURE: 26 MMHG | OXYGEN SATURATION: 82 % | RESPIRATION RATE: 17 BRPM

## 2020-01-01 VITALS
OXYGEN SATURATION: 100 % | HEIGHT: 63 IN | SYSTOLIC BLOOD PRESSURE: 94 MMHG | RESPIRATION RATE: 16 BRPM | HEART RATE: 50 BPM | DIASTOLIC BLOOD PRESSURE: 58 MMHG | TEMPERATURE: 97 F | WEIGHT: 119.05 LBS

## 2020-01-01 VITALS
WEIGHT: 130.07 LBS | TEMPERATURE: 95 F | SYSTOLIC BLOOD PRESSURE: 85 MMHG | DIASTOLIC BLOOD PRESSURE: 40 MMHG | OXYGEN SATURATION: 87 % | HEART RATE: 75 BPM | RESPIRATION RATE: 13 BRPM | HEIGHT: 62.99 IN

## 2020-01-01 VITALS
TEMPERATURE: 98 F | HEART RATE: 68 BPM | OXYGEN SATURATION: 100 % | SYSTOLIC BLOOD PRESSURE: 100 MMHG | DIASTOLIC BLOOD PRESSURE: 46 MMHG | RESPIRATION RATE: 13 BRPM

## 2020-01-01 DIAGNOSIS — R13.10 DYSPHAGIA, UNSPECIFIED: ICD-10-CM

## 2020-01-01 DIAGNOSIS — A41.9 SEPSIS, UNSPECIFIED ORGANISM: ICD-10-CM

## 2020-01-01 DIAGNOSIS — K91.89 OTHER POSTPROCEDURAL COMPLICATIONS AND DISORDERS OF DIGESTIVE SYSTEM: Chronic | ICD-10-CM

## 2020-01-01 DIAGNOSIS — Z95.5 PRESENCE OF CORONARY ANGIOPLASTY IMPLANT AND GRAFT: Chronic | ICD-10-CM

## 2020-01-01 DIAGNOSIS — Z96.649 PRESENCE OF UNSPECIFIED ARTIFICIAL HIP JOINT: Chronic | ICD-10-CM

## 2020-01-01 DIAGNOSIS — Z90.89 ACQUIRED ABSENCE OF OTHER ORGANS: Chronic | ICD-10-CM

## 2020-01-01 DIAGNOSIS — Z98.890 OTHER SPECIFIED POSTPROCEDURAL STATES: Chronic | ICD-10-CM

## 2020-01-01 DIAGNOSIS — J44.9 CHRONIC OBSTRUCTIVE PULMONARY DISEASE, UNSPECIFIED: ICD-10-CM

## 2020-01-01 DIAGNOSIS — Z51.5 ENCOUNTER FOR PALLIATIVE CARE: ICD-10-CM

## 2020-01-01 DIAGNOSIS — M12.9 ARTHROPATHY, UNSPECIFIED: Chronic | ICD-10-CM

## 2020-01-01 DIAGNOSIS — R06.89 OTHER ABNORMALITIES OF BREATHING: ICD-10-CM

## 2020-01-01 LAB
ACTH SER-ACNC: 11.7 PG/ML — SIGNIFICANT CHANGE UP (ref 7.2–63.3)
ACTH SER-ACNC: 31.7 PG/ML — SIGNIFICANT CHANGE UP (ref 7.2–63.3)
ALBUMIN SERPL ELPH-MCNC: 2.6 G/DL — LOW (ref 3.3–5)
ALBUMIN SERPL ELPH-MCNC: 2.6 G/DL — LOW (ref 3.3–5)
ALP SERPL-CCNC: 274 U/L — HIGH (ref 40–120)
ALP SERPL-CCNC: 282 U/L — HIGH (ref 40–120)
ALT FLD-CCNC: 25 U/L — SIGNIFICANT CHANGE UP (ref 10–45)
ALT FLD-CCNC: 27 U/L — SIGNIFICANT CHANGE UP (ref 10–45)
ANION GAP SERPL CALC-SCNC: 10 MMOL/L — SIGNIFICANT CHANGE UP (ref 5–17)
ANION GAP SERPL CALC-SCNC: 5 MMOL/L — SIGNIFICANT CHANGE UP (ref 5–17)
ANION GAP SERPL CALC-SCNC: 8 MMOL/L — SIGNIFICANT CHANGE UP (ref 5–17)
ANISOCYTOSIS BLD QL: SLIGHT — SIGNIFICANT CHANGE UP
APPEARANCE UR: CLEAR — SIGNIFICANT CHANGE UP
APTT BLD: 44.1 SEC — HIGH (ref 27.5–36.3)
AST SERPL-CCNC: 34 U/L — SIGNIFICANT CHANGE UP (ref 10–40)
AST SERPL-CCNC: 36 U/L — SIGNIFICANT CHANGE UP (ref 10–40)
BASOPHILS # BLD AUTO: 0.03 K/UL — SIGNIFICANT CHANGE UP (ref 0–0.2)
BASOPHILS NFR BLD AUTO: 0.7 % — SIGNIFICANT CHANGE UP (ref 0–2)
BILIRUB SERPL-MCNC: 0.2 MG/DL — SIGNIFICANT CHANGE UP (ref 0.2–1.2)
BILIRUB SERPL-MCNC: 0.2 MG/DL — SIGNIFICANT CHANGE UP (ref 0.2–1.2)
BILIRUB UR-MCNC: NEGATIVE — SIGNIFICANT CHANGE UP
BLOOD GAS COMMENTS ARTERIAL: SIGNIFICANT CHANGE UP
BUN SERPL-MCNC: 17 MG/DL — SIGNIFICANT CHANGE UP (ref 7–23)
BUN SERPL-MCNC: 19 MG/DL — SIGNIFICANT CHANGE UP (ref 7–23)
BUN SERPL-MCNC: 23 MG/DL — SIGNIFICANT CHANGE UP (ref 7–23)
CALCIUM SERPL-MCNC: 8.5 MG/DL — SIGNIFICANT CHANGE UP (ref 8.4–10.5)
CALCIUM SERPL-MCNC: 8.6 MG/DL — SIGNIFICANT CHANGE UP (ref 8.4–10.5)
CALCIUM SERPL-MCNC: 9 MG/DL — SIGNIFICANT CHANGE UP (ref 8.4–10.5)
CHLORIDE SERPL-SCNC: 101 MMOL/L — SIGNIFICANT CHANGE UP (ref 96–108)
CHLORIDE SERPL-SCNC: 105 MMOL/L — SIGNIFICANT CHANGE UP (ref 96–108)
CHLORIDE SERPL-SCNC: 105 MMOL/L — SIGNIFICANT CHANGE UP (ref 96–108)
CK SERPL-CCNC: 103 U/L — SIGNIFICANT CHANGE UP (ref 30–200)
CO2 BLDA-SCNC: 13 MMOL/L — LOW (ref 22–30)
CO2 BLDA-SCNC: 32 MMOL/L — HIGH (ref 22–30)
CO2 BLDV-SCNC: 29 MMOL/L — SIGNIFICANT CHANGE UP (ref 21–29)
CO2 SERPL-SCNC: 28 MMOL/L — SIGNIFICANT CHANGE UP (ref 22–31)
CO2 SERPL-SCNC: 29 MMOL/L — SIGNIFICANT CHANGE UP (ref 22–31)
CO2 SERPL-SCNC: 33 MMOL/L — HIGH (ref 22–31)
COHGB MFR BLDV: 3 % — HIGH (ref 0–2)
COLOR SPEC: YELLOW — SIGNIFICANT CHANGE UP
CREAT SERPL-MCNC: 0.81 MG/DL — SIGNIFICANT CHANGE UP (ref 0.5–1.3)
CREAT SERPL-MCNC: 0.94 MG/DL — SIGNIFICANT CHANGE UP (ref 0.5–1.3)
CREAT SERPL-MCNC: 1.02 MG/DL — SIGNIFICANT CHANGE UP (ref 0.5–1.3)
CULTURE RESULTS: NO GROWTH — SIGNIFICANT CHANGE UP
DIFF PNL FLD: NEGATIVE — SIGNIFICANT CHANGE UP
EOSINOPHIL # BLD AUTO: 0.07 K/UL — SIGNIFICANT CHANGE UP (ref 0–0.5)
EOSINOPHIL NFR BLD AUTO: 1.6 % — SIGNIFICANT CHANGE UP (ref 0–6)
FLU A RESULT: SIGNIFICANT CHANGE UP
FLU A RESULT: SIGNIFICANT CHANGE UP
FLUAV AG NPH QL: SIGNIFICANT CHANGE UP
FLUBV AG NPH QL: SIGNIFICANT CHANGE UP
GAS PNL BLDA: SIGNIFICANT CHANGE UP
GAS PNL BLDA: SIGNIFICANT CHANGE UP
GIANT PLATELETS BLD QL SMEAR: PRESENT — SIGNIFICANT CHANGE UP
GLUCOSE BLDC GLUCOMTR-MCNC: 163 MG/DL — HIGH (ref 70–99)
GLUCOSE BLDC GLUCOMTR-MCNC: 23 MG/DL — CRITICAL LOW (ref 70–99)
GLUCOSE BLDC GLUCOMTR-MCNC: 83 MG/DL — SIGNIFICANT CHANGE UP (ref 70–99)
GLUCOSE BLDC GLUCOMTR-MCNC: 95 MG/DL — SIGNIFICANT CHANGE UP (ref 70–99)
GLUCOSE SERPL-MCNC: 25 MG/DL — CRITICAL LOW (ref 70–99)
GLUCOSE SERPL-MCNC: 74 MG/DL — SIGNIFICANT CHANGE UP (ref 70–99)
GLUCOSE SERPL-MCNC: 76 MG/DL — SIGNIFICANT CHANGE UP (ref 70–99)
GLUCOSE UR QL: NEGATIVE — SIGNIFICANT CHANGE UP
HBA1C BLD-MCNC: 4.7 % — SIGNIFICANT CHANGE UP (ref 4–5.6)
HCT VFR BLD CALC: 28.8 % — LOW (ref 39–50)
HCT VFR BLD CALC: 29.1 % — LOW (ref 39–50)
HCT VFR BLD CALC: 31.6 % — LOW (ref 39–50)
HGB BLD-MCNC: 8.9 G/DL — LOW (ref 13–17)
HGB BLD-MCNC: 8.9 G/DL — LOW (ref 13–17)
HGB BLD-MCNC: 9.5 G/DL — LOW (ref 13–17)
HOROWITZ INDEX BLDA+IHG-RTO: SIGNIFICANT CHANGE UP
HOROWITZ INDEX BLDA+IHG-RTO: SIGNIFICANT CHANGE UP
IMM GRANULOCYTES NFR BLD AUTO: 0.2 % — SIGNIFICANT CHANGE UP (ref 0–1.5)
INR BLD: 1.08 RATIO — SIGNIFICANT CHANGE UP (ref 0.88–1.16)
INR BLD: 1.2 RATIO — HIGH (ref 0.88–1.16)
KETONES UR-MCNC: NEGATIVE — SIGNIFICANT CHANGE UP
LACTATE SERPL-SCNC: 0.8 MMOL/L — SIGNIFICANT CHANGE UP (ref 0.7–2)
LEUKOCYTE ESTERASE UR-ACNC: NEGATIVE — SIGNIFICANT CHANGE UP
LYMPHOCYTES # BLD AUTO: 0.63 K/UL — LOW (ref 1–3.3)
LYMPHOCYTES # BLD AUTO: 14.8 % — SIGNIFICANT CHANGE UP (ref 13–44)
MACROCYTES BLD QL: SLIGHT — SIGNIFICANT CHANGE UP
MAGNESIUM SERPL-MCNC: 1.7 MG/DL — SIGNIFICANT CHANGE UP (ref 1.6–2.6)
MAGNESIUM SERPL-MCNC: 2.5 MG/DL — SIGNIFICANT CHANGE UP (ref 1.6–2.6)
MANUAL SMEAR VERIFICATION: SIGNIFICANT CHANGE UP
MCHC RBC-ENTMCNC: 30.1 GM/DL — LOW (ref 32–36)
MCHC RBC-ENTMCNC: 30.6 GM/DL — LOW (ref 32–36)
MCHC RBC-ENTMCNC: 30.9 GM/DL — LOW (ref 32–36)
MCHC RBC-ENTMCNC: 31.8 PG — SIGNIFICANT CHANGE UP (ref 27–34)
MCHC RBC-ENTMCNC: 32.4 PG — SIGNIFICANT CHANGE UP (ref 27–34)
MCHC RBC-ENTMCNC: 32.4 PG — SIGNIFICANT CHANGE UP (ref 27–34)
MCV RBC AUTO: 104.7 FL — HIGH (ref 80–100)
MCV RBC AUTO: 105.7 FL — HIGH (ref 80–100)
MCV RBC AUTO: 105.8 FL — HIGH (ref 80–100)
MONOCYTES # BLD AUTO: 0.39 K/UL — SIGNIFICANT CHANGE UP (ref 0–0.9)
MONOCYTES NFR BLD AUTO: 9.2 % — SIGNIFICANT CHANGE UP (ref 2–14)
NEUTROPHILS # BLD AUTO: 3.13 K/UL — SIGNIFICANT CHANGE UP (ref 1.8–7.4)
NEUTROPHILS NFR BLD AUTO: 73.5 % — SIGNIFICANT CHANGE UP (ref 43–77)
NITRITE UR-MCNC: NEGATIVE — SIGNIFICANT CHANGE UP
NRBC # BLD: 0 /100 WBCS — SIGNIFICANT CHANGE UP (ref 0–0)
NT-PROBNP SERPL-SCNC: 4887 PG/ML — HIGH (ref 0–300)
OVALOCYTES BLD QL SMEAR: SLIGHT — SIGNIFICANT CHANGE UP
PCO2 BLDA: 27 MMHG — LOW (ref 32–46)
PCO2 BLDA: 73 MMHG — CRITICAL HIGH (ref 32–46)
PCO2 BLDV: 60 MMHG — HIGH (ref 35–50)
PH BLDA: 7.23 — LOW (ref 7.35–7.45)
PH BLDA: 7.27 — LOW (ref 7.35–7.45)
PH BLDV: 7.28 — LOW (ref 7.35–7.45)
PH UR: 5 — SIGNIFICANT CHANGE UP (ref 5–8)
PHENYTOIN FREE SERPL-MCNC: 2.8 MG/L — HIGH (ref 1–2)
PHOSPHATE SERPL-MCNC: 4.1 MG/DL — SIGNIFICANT CHANGE UP (ref 2.5–4.5)
PLAT MORPH BLD: NORMAL — SIGNIFICANT CHANGE UP
PLATELET # BLD AUTO: 127 K/UL — LOW (ref 150–400)
PLATELET # BLD AUTO: 135 K/UL — LOW (ref 150–400)
PLATELET # BLD AUTO: 97 K/UL — LOW (ref 150–400)
PO2 BLDA: 184 MMHG — HIGH (ref 74–108)
PO2 BLDA: 57 MMHG — LOW (ref 74–108)
PO2 BLDV: 65 MMHG — HIGH (ref 25–45)
POIKILOCYTOSIS BLD QL AUTO: SLIGHT — SIGNIFICANT CHANGE UP
POTASSIUM SERPL-MCNC: 4.3 MMOL/L — SIGNIFICANT CHANGE UP (ref 3.5–5.3)
POTASSIUM SERPL-MCNC: 4.4 MMOL/L — SIGNIFICANT CHANGE UP (ref 3.5–5.3)
POTASSIUM SERPL-MCNC: 4.7 MMOL/L — SIGNIFICANT CHANGE UP (ref 3.5–5.3)
POTASSIUM SERPL-SCNC: 4.3 MMOL/L — SIGNIFICANT CHANGE UP (ref 3.5–5.3)
POTASSIUM SERPL-SCNC: 4.4 MMOL/L — SIGNIFICANT CHANGE UP (ref 3.5–5.3)
POTASSIUM SERPL-SCNC: 4.7 MMOL/L — SIGNIFICANT CHANGE UP (ref 3.5–5.3)
PROCALCITONIN SERPL-MCNC: 0.06 NG/ML — SIGNIFICANT CHANGE UP
PROT SERPL-MCNC: 6.5 G/DL — SIGNIFICANT CHANGE UP (ref 6–8.3)
PROT SERPL-MCNC: 6.6 G/DL — SIGNIFICANT CHANGE UP (ref 6–8.3)
PROT UR-MCNC: NEGATIVE — SIGNIFICANT CHANGE UP
PROTHROM AB SERPL-ACNC: 12.1 SEC — SIGNIFICANT CHANGE UP (ref 10–12.9)
PROTHROM AB SERPL-ACNC: 13.5 SEC — HIGH (ref 10–12.9)
RAPID RVP RESULT: SIGNIFICANT CHANGE UP
RBC # BLD: 2.75 M/UL — LOW (ref 4.2–5.8)
RBC # BLD: 2.75 M/UL — LOW (ref 4.2–5.8)
RBC # BLD: 2.99 M/UL — LOW (ref 4.2–5.8)
RBC # FLD: 14.5 % — SIGNIFICANT CHANGE UP (ref 10.3–14.5)
RBC # FLD: 14.7 % — HIGH (ref 10.3–14.5)
RBC # FLD: 14.7 % — HIGH (ref 10.3–14.5)
RBC BLD AUTO: ABNORMAL
RSV RESULT: SIGNIFICANT CHANGE UP
RSV RNA RESP QL NAA+PROBE: SIGNIFICANT CHANGE UP
SAO2 % BLDA: 87 % — LOW (ref 92–96)
SAO2 % BLDA: 99 % — HIGH (ref 92–96)
SAO2 % BLDV: 90 % — HIGH (ref 67–88)
SODIUM SERPL-SCNC: 139 MMOL/L — SIGNIFICANT CHANGE UP (ref 135–145)
SODIUM SERPL-SCNC: 142 MMOL/L — SIGNIFICANT CHANGE UP (ref 135–145)
SODIUM SERPL-SCNC: 143 MMOL/L — SIGNIFICANT CHANGE UP (ref 135–145)
SP GR SPEC: 1.01 — SIGNIFICANT CHANGE UP (ref 1.01–1.02)
SPECIMEN SOURCE: SIGNIFICANT CHANGE UP
T3 SERPL-MCNC: 37 NG/DL — LOW (ref 80–200)
T4 FREE SERPL-MCNC: 0.8 NG/DL — LOW (ref 0.9–1.8)
TROPONIN I SERPL-MCNC: 0.02 NG/ML — SIGNIFICANT CHANGE UP (ref 0.02–0.06)
TROPONIN I SERPL-MCNC: 0.02 NG/ML — SIGNIFICANT CHANGE UP (ref 0.02–0.06)
TSH SERPL-MCNC: 3.71 UIU/ML — SIGNIFICANT CHANGE UP (ref 0.27–4.2)
UROBILINOGEN FLD QL: NEGATIVE — SIGNIFICANT CHANGE UP
WBC # BLD: 4.26 K/UL — SIGNIFICANT CHANGE UP (ref 3.8–10.5)
WBC # BLD: 6.67 K/UL — SIGNIFICANT CHANGE UP (ref 3.8–10.5)
WBC # BLD: 7.39 K/UL — SIGNIFICANT CHANGE UP (ref 3.8–10.5)
WBC # FLD AUTO: 4.26 K/UL — SIGNIFICANT CHANGE UP (ref 3.8–10.5)
WBC # FLD AUTO: 6.67 K/UL — SIGNIFICANT CHANGE UP (ref 3.8–10.5)
WBC # FLD AUTO: 7.39 K/UL — SIGNIFICANT CHANGE UP (ref 3.8–10.5)

## 2020-01-01 PROCEDURE — 87631 RESP VIRUS 3-5 TARGETS: CPT

## 2020-01-01 PROCEDURE — 84145 PROCALCITONIN (PCT): CPT

## 2020-01-01 PROCEDURE — 99223 1ST HOSP IP/OBS HIGH 75: CPT

## 2020-01-01 PROCEDURE — 99232 SBSQ HOSP IP/OBS MODERATE 35: CPT | Mod: GV

## 2020-01-01 PROCEDURE — 82550 ASSAY OF CK (CPK): CPT

## 2020-01-01 PROCEDURE — 87040 BLOOD CULTURE FOR BACTERIA: CPT

## 2020-01-01 PROCEDURE — 83735 ASSAY OF MAGNESIUM: CPT

## 2020-01-01 PROCEDURE — 96361 HYDRATE IV INFUSION ADD-ON: CPT

## 2020-01-01 PROCEDURE — 83880 ASSAY OF NATRIURETIC PEPTIDE: CPT

## 2020-01-01 PROCEDURE — 70450 CT HEAD/BRAIN W/O DYE: CPT

## 2020-01-01 PROCEDURE — 84439 ASSAY OF FREE THYROXINE: CPT

## 2020-01-01 PROCEDURE — 99291 CRITICAL CARE FIRST HOUR: CPT | Mod: 25

## 2020-01-01 PROCEDURE — 85730 THROMBOPLASTIN TIME PARTIAL: CPT

## 2020-01-01 PROCEDURE — 99221 1ST HOSP IP/OBS SF/LOW 40: CPT | Mod: GV

## 2020-01-01 PROCEDURE — 84443 ASSAY THYROID STIM HORMONE: CPT

## 2020-01-01 PROCEDURE — G0508: CPT

## 2020-01-01 PROCEDURE — 87086 URINE CULTURE/COLONY COUNT: CPT

## 2020-01-01 PROCEDURE — 82962 GLUCOSE BLOOD TEST: CPT

## 2020-01-01 PROCEDURE — 84484 ASSAY OF TROPONIN QUANT: CPT

## 2020-01-01 PROCEDURE — 87633 RESP VIRUS 12-25 TARGETS: CPT

## 2020-01-01 PROCEDURE — 93005 ELECTROCARDIOGRAM TRACING: CPT

## 2020-01-01 PROCEDURE — 99291 CRITICAL CARE FIRST HOUR: CPT

## 2020-01-01 PROCEDURE — 36415 COLL VENOUS BLD VENIPUNCTURE: CPT

## 2020-01-01 PROCEDURE — 84480 ASSAY TRIIODOTHYRONINE (T3): CPT

## 2020-01-01 PROCEDURE — 93306 TTE W/DOPPLER COMPLETE: CPT

## 2020-01-01 PROCEDURE — 87798 DETECT AGENT NOS DNA AMP: CPT

## 2020-01-01 PROCEDURE — 80048 BASIC METABOLIC PNL TOTAL CA: CPT

## 2020-01-01 PROCEDURE — 99213 OFFICE O/P EST LOW 20 MIN: CPT

## 2020-01-01 PROCEDURE — 82375 ASSAY CARBOXYHB QUANT: CPT

## 2020-01-01 PROCEDURE — 85027 COMPLETE CBC AUTOMATED: CPT

## 2020-01-01 PROCEDURE — 83036 HEMOGLOBIN GLYCOSYLATED A1C: CPT

## 2020-01-01 PROCEDURE — 93306 TTE W/DOPPLER COMPLETE: CPT | Mod: 26

## 2020-01-01 PROCEDURE — 93010 ELECTROCARDIOGRAM REPORT: CPT

## 2020-01-01 PROCEDURE — 85610 PROTHROMBIN TIME: CPT

## 2020-01-01 PROCEDURE — 99238 HOSP IP/OBS DSCHRG MGMT 30/<: CPT

## 2020-01-01 PROCEDURE — 94640 AIRWAY INHALATION TREATMENT: CPT

## 2020-01-01 PROCEDURE — 71045 X-RAY EXAM CHEST 1 VIEW: CPT | Mod: 26

## 2020-01-01 PROCEDURE — 70450 CT HEAD/BRAIN W/O DYE: CPT | Mod: 26

## 2020-01-01 PROCEDURE — 96365 THER/PROPH/DIAG IV INF INIT: CPT

## 2020-01-01 PROCEDURE — 94660 CPAP INITIATION&MGMT: CPT

## 2020-01-01 PROCEDURE — 82803 BLOOD GASES ANY COMBINATION: CPT

## 2020-01-01 PROCEDURE — 87486 CHLMYD PNEUM DNA AMP PROBE: CPT

## 2020-01-01 PROCEDURE — 80186 ASSAY OF PHENYTOIN FREE: CPT

## 2020-01-01 PROCEDURE — 80053 COMPREHEN METABOLIC PANEL: CPT

## 2020-01-01 PROCEDURE — 87581 M.PNEUMON DNA AMP PROBE: CPT

## 2020-01-01 PROCEDURE — 82024 ASSAY OF ACTH: CPT

## 2020-01-01 PROCEDURE — 82533 TOTAL CORTISOL: CPT

## 2020-01-01 PROCEDURE — 83605 ASSAY OF LACTIC ACID: CPT

## 2020-01-01 PROCEDURE — 84100 ASSAY OF PHOSPHORUS: CPT

## 2020-01-01 PROCEDURE — 71045 X-RAY EXAM CHEST 1 VIEW: CPT

## 2020-01-01 RX ORDER — SIMVASTATIN 20 MG/1
1 TABLET, FILM COATED ORAL
Qty: 0 | Refills: 0 | DISCHARGE

## 2020-01-01 RX ORDER — PHENYLEPHRINE HYDROCHLORIDE 10 MG/ML
0.1 INJECTION INTRAVENOUS
Qty: 160 | Refills: 0 | Status: DISCONTINUED | OUTPATIENT
Start: 2020-01-01 | End: 2020-01-01

## 2020-01-01 RX ORDER — ROBINUL 0.2 MG/ML
0.1 INJECTION INTRAMUSCULAR; INTRAVENOUS EVERY 8 HOURS
Refills: 0 | Status: DISCONTINUED | OUTPATIENT
Start: 2020-01-01 | End: 2020-01-01

## 2020-01-01 RX ORDER — ATORVASTATIN CALCIUM 80 MG/1
20 TABLET, FILM COATED ORAL AT BEDTIME
Refills: 0 | Status: DISCONTINUED | OUTPATIENT
Start: 2020-01-01 | End: 2020-01-01

## 2020-01-01 RX ORDER — CLOPIDOGREL BISULFATE 75 MG/1
1 TABLET, FILM COATED ORAL
Qty: 0 | Refills: 0 | DISCHARGE

## 2020-01-01 RX ORDER — MORPHINE SULFATE 50 MG/1
2 CAPSULE, EXTENDED RELEASE ORAL
Refills: 0 | Status: DISCONTINUED | OUTPATIENT
Start: 2020-01-01 | End: 2020-01-01

## 2020-01-01 RX ORDER — HEPARIN SODIUM 5000 [USP'U]/ML
5000 INJECTION INTRAVENOUS; SUBCUTANEOUS EVERY 12 HOURS
Refills: 0 | Status: DISCONTINUED | OUTPATIENT
Start: 2020-01-01 | End: 2020-01-01

## 2020-01-01 RX ORDER — METOCLOPRAMIDE HCL 10 MG
10 TABLET ORAL ONCE
Refills: 0 | Status: DISCONTINUED | OUTPATIENT
Start: 2020-01-01 | End: 2020-01-01

## 2020-01-01 RX ORDER — HYDROMORPHONE HYDROCHLORIDE 2 MG/ML
0.5 INJECTION INTRAMUSCULAR; INTRAVENOUS; SUBCUTANEOUS EVERY 8 HOURS
Refills: 0 | Status: DISCONTINUED | OUTPATIENT
Start: 2020-01-01 | End: 2020-01-01

## 2020-01-01 RX ORDER — LEVOTHYROXINE SODIUM 125 MCG
25 TABLET ORAL AT BEDTIME
Refills: 0 | Status: DISCONTINUED | OUTPATIENT
Start: 2020-01-01 | End: 2020-01-01

## 2020-01-01 RX ORDER — PANTOPRAZOLE SODIUM 20 MG/1
40 TABLET, DELAYED RELEASE ORAL
Refills: 0 | Status: DISCONTINUED | OUTPATIENT
Start: 2020-01-01 | End: 2020-01-01

## 2020-01-01 RX ORDER — DIPHENHYDRAMINE HCL 50 MG
25 CAPSULE ORAL EVERY 8 HOURS
Refills: 0 | Status: DISCONTINUED | OUTPATIENT
Start: 2020-01-01 | End: 2020-01-01

## 2020-01-01 RX ORDER — CLOPIDOGREL BISULFATE 75 MG/1
75 TABLET, FILM COATED ORAL DAILY
Refills: 0 | Status: DISCONTINUED | OUTPATIENT
Start: 2020-01-01 | End: 2020-01-01

## 2020-01-01 RX ORDER — MORPHINE SULFATE 50 MG/1
5 CAPSULE, EXTENDED RELEASE ORAL
Qty: 50 | Refills: 0 | Status: DISCONTINUED | OUTPATIENT
Start: 2020-01-01 | End: 2020-01-01

## 2020-01-01 RX ORDER — CEFEPIME 1 G/1
2000 INJECTION, POWDER, FOR SOLUTION INTRAMUSCULAR; INTRAVENOUS EVERY 12 HOURS
Refills: 0 | Status: DISCONTINUED | OUTPATIENT
Start: 2020-01-01 | End: 2020-01-01

## 2020-01-01 RX ORDER — SIMVASTATIN 20 MG/1
40 TABLET, FILM COATED ORAL AT BEDTIME
Refills: 0 | Status: DISCONTINUED | OUTPATIENT
Start: 2020-01-01 | End: 2020-01-01

## 2020-01-01 RX ORDER — LEVOTHYROXINE SODIUM 125 MCG
50 TABLET ORAL DAILY
Refills: 0 | Status: DISCONTINUED | OUTPATIENT
Start: 2020-01-01 | End: 2020-01-01

## 2020-01-01 RX ORDER — DEXTROSE 50 % IN WATER 50 %
50 SYRINGE (ML) INTRAVENOUS ONCE
Refills: 0 | Status: COMPLETED | OUTPATIENT
Start: 2020-01-01 | End: 2020-01-01

## 2020-01-01 RX ORDER — CEFEPIME 1 G/1
INJECTION, POWDER, FOR SOLUTION INTRAMUSCULAR; INTRAVENOUS
Refills: 0 | Status: DISCONTINUED | OUTPATIENT
Start: 2020-01-01 | End: 2020-01-01

## 2020-01-01 RX ORDER — FUROSEMIDE 40 MG
40 TABLET ORAL DAILY
Refills: 0 | Status: DISCONTINUED | OUTPATIENT
Start: 2020-01-01 | End: 2020-01-01

## 2020-01-01 RX ORDER — FERROUS SULFATE 325(65) MG
325 TABLET ORAL DAILY
Refills: 0 | Status: DISCONTINUED | OUTPATIENT
Start: 2020-01-01 | End: 2020-01-01

## 2020-01-01 RX ORDER — SODIUM CHLORIDE 0.65 %
2 AEROSOL, SPRAY (ML) NASAL
Qty: 0 | Refills: 0 | DISCHARGE
Start: 2020-01-01

## 2020-01-01 RX ORDER — IPRATROPIUM/ALBUTEROL SULFATE 18-103MCG
3 AEROSOL WITH ADAPTER (GRAM) INHALATION EVERY 6 HOURS
Refills: 0 | Status: DISCONTINUED | OUTPATIENT
Start: 2020-01-01 | End: 2020-01-01

## 2020-01-01 RX ORDER — ROBINUL 0.2 MG/ML
0.1 INJECTION INTRAMUSCULAR; INTRAVENOUS
Qty: 0 | Refills: 0 | DISCHARGE
Start: 2020-01-01

## 2020-01-01 RX ORDER — CEFEPIME 1 G/1
2000 INJECTION, POWDER, FOR SOLUTION INTRAMUSCULAR; INTRAVENOUS ONCE
Refills: 0 | Status: COMPLETED | OUTPATIENT
Start: 2020-01-01 | End: 2020-01-01

## 2020-01-01 RX ORDER — FINASTERIDE 5 MG/1
5 TABLET, FILM COATED ORAL DAILY
Refills: 0 | Status: DISCONTINUED | OUTPATIENT
Start: 2020-01-01 | End: 2020-01-01

## 2020-01-01 RX ORDER — SODIUM CHLORIDE 0.65 %
2 AEROSOL, SPRAY (ML) NASAL
Refills: 0 | Status: DISCONTINUED | OUTPATIENT
Start: 2020-01-01 | End: 2020-01-01

## 2020-01-01 RX ORDER — EZETIMIBE 10 MG/1
1 TABLET ORAL
Qty: 0 | Refills: 0 | DISCHARGE

## 2020-01-01 RX ORDER — MAGNESIUM SULFATE 500 MG/ML
1 VIAL (ML) INJECTION ONCE
Refills: 0 | Status: COMPLETED | OUTPATIENT
Start: 2020-01-01 | End: 2020-01-01

## 2020-01-01 RX ORDER — ESCITALOPRAM OXALATE 10 MG/1
5 TABLET, FILM COATED ORAL DAILY
Refills: 0 | Status: DISCONTINUED | OUTPATIENT
Start: 2020-01-01 | End: 2020-01-01

## 2020-01-01 RX ORDER — TAMSULOSIN HYDROCHLORIDE 0.4 MG/1
1 CAPSULE ORAL
Qty: 0 | Refills: 0 | DISCHARGE

## 2020-01-01 RX ORDER — PHENYLEPHRINE HYDROCHLORIDE 10 MG/ML
0.1 INJECTION INTRAVENOUS
Qty: 40 | Refills: 0 | Status: DISCONTINUED | OUTPATIENT
Start: 2020-01-01 | End: 2020-01-01

## 2020-01-01 RX ORDER — SENNA PLUS 8.6 MG/1
1 TABLET ORAL AT BEDTIME
Refills: 0 | Status: DISCONTINUED | OUTPATIENT
Start: 2020-01-01 | End: 2020-01-01

## 2020-01-01 RX ORDER — LEVOTHYROXINE SODIUM 125 MCG
1 TABLET ORAL
Qty: 0 | Refills: 0 | DISCHARGE

## 2020-01-01 RX ORDER — AZITHROMYCIN 500 MG/1
500 TABLET, FILM COATED ORAL EVERY 24 HOURS
Refills: 0 | Status: DISCONTINUED | OUTPATIENT
Start: 2020-01-01 | End: 2020-01-01

## 2020-01-01 RX ORDER — LIDOCAINE 4 G/100G
1 CREAM TOPICAL EVERY 4 HOURS
Refills: 0 | Status: DISCONTINUED | OUTPATIENT
Start: 2020-01-01 | End: 2020-01-01

## 2020-01-01 RX ORDER — DIPHENHYDRAMINE HCL 50 MG
25 CAPSULE ORAL ONCE
Refills: 0 | Status: COMPLETED | OUTPATIENT
Start: 2020-01-01 | End: 2020-01-01

## 2020-01-01 RX ORDER — MONTELUKAST 4 MG/1
10 TABLET, CHEWABLE ORAL AT BEDTIME
Refills: 0 | Status: DISCONTINUED | OUTPATIENT
Start: 2020-01-01 | End: 2020-01-01

## 2020-01-01 RX ORDER — ACETAMINOPHEN 500 MG
650 TABLET ORAL EVERY 6 HOURS
Refills: 0 | Status: DISCONTINUED | OUTPATIENT
Start: 2020-01-01 | End: 2020-01-01

## 2020-01-01 RX ORDER — SODIUM CHLORIDE 9 MG/ML
1750 INJECTION INTRAMUSCULAR; INTRAVENOUS; SUBCUTANEOUS ONCE
Refills: 0 | Status: COMPLETED | OUTPATIENT
Start: 2020-01-01 | End: 2020-01-01

## 2020-01-01 RX ORDER — TAMSULOSIN HYDROCHLORIDE 0.4 MG/1
0.4 CAPSULE ORAL AT BEDTIME
Refills: 0 | Status: DISCONTINUED | OUTPATIENT
Start: 2020-01-01 | End: 2020-01-01

## 2020-01-01 RX ORDER — HYDROCORTISONE 20 MG
50 TABLET ORAL EVERY 8 HOURS
Refills: 0 | Status: DISCONTINUED | OUTPATIENT
Start: 2020-01-01 | End: 2020-01-01

## 2020-01-01 RX ORDER — SENNA PLUS 8.6 MG/1
2 TABLET ORAL
Refills: 0 | Status: DISCONTINUED | OUTPATIENT
Start: 2020-01-01 | End: 2020-01-01

## 2020-01-01 RX ORDER — LIDOCAINE 4 G/100G
1 CREAM TOPICAL
Qty: 0 | Refills: 0 | DISCHARGE
Start: 2020-01-01

## 2020-01-01 RX ORDER — PANTOPRAZOLE SODIUM 20 MG/1
40 TABLET, DELAYED RELEASE ORAL DAILY
Refills: 0 | Status: DISCONTINUED | OUTPATIENT
Start: 2020-01-01 | End: 2020-01-01

## 2020-01-01 RX ORDER — SODIUM CHLORIDE 9 MG/ML
1650 INJECTION INTRAMUSCULAR; INTRAVENOUS; SUBCUTANEOUS ONCE
Refills: 0 | Status: COMPLETED | OUTPATIENT
Start: 2020-01-01 | End: 2020-01-01

## 2020-01-01 RX ADMIN — HEPARIN SODIUM 5000 UNIT(S): 5000 INJECTION INTRAVENOUS; SUBCUTANEOUS at 06:50

## 2020-01-01 RX ADMIN — Medication 50 MILLIGRAM(S): at 11:14

## 2020-01-01 RX ADMIN — Medication 3 MILLILITER(S): at 04:57

## 2020-01-01 RX ADMIN — PHENYLEPHRINE HYDROCHLORIDE 2.02 MICROGRAM(S)/KG/MIN: 10 INJECTION INTRAVENOUS at 02:49

## 2020-01-01 RX ADMIN — HEPARIN SODIUM 5000 UNIT(S): 5000 INJECTION INTRAVENOUS; SUBCUTANEOUS at 17:07

## 2020-01-01 RX ADMIN — MORPHINE SULFATE 5 MG/HR: 50 CAPSULE, EXTENDED RELEASE ORAL at 13:34

## 2020-01-01 RX ADMIN — MORPHINE SULFATE 5 MG/HR: 50 CAPSULE, EXTENDED RELEASE ORAL at 18:15

## 2020-01-01 RX ADMIN — Medication 50 MICROGRAM(S): at 06:26

## 2020-01-01 RX ADMIN — Medication 50 MILLIGRAM(S): at 06:50

## 2020-01-01 RX ADMIN — SODIUM CHLORIDE 1750 MILLILITER(S): 9 INJECTION INTRAMUSCULAR; INTRAVENOUS; SUBCUTANEOUS at 15:39

## 2020-01-01 RX ADMIN — MONTELUKAST 10 MILLIGRAM(S): 4 TABLET, CHEWABLE ORAL at 22:50

## 2020-01-01 RX ADMIN — PHENYLEPHRINE HYDROCHLORIDE 2.02 MICROGRAM(S)/KG/MIN: 10 INJECTION INTRAVENOUS at 17:20

## 2020-01-01 RX ADMIN — Medication 3 MILLILITER(S): at 09:11

## 2020-01-01 RX ADMIN — Medication 50 MILLIGRAM(S): at 14:53

## 2020-01-01 RX ADMIN — Medication 3 MILLILITER(S): at 22:03

## 2020-01-01 RX ADMIN — Medication 3 MILLILITER(S): at 09:01

## 2020-01-01 RX ADMIN — Medication 104 MILLIGRAM(S): at 17:00

## 2020-01-01 RX ADMIN — Medication 3 MILLILITER(S): at 08:03

## 2020-01-01 RX ADMIN — MORPHINE SULFATE 2 MILLIGRAM(S): 50 CAPSULE, EXTENDED RELEASE ORAL at 11:52

## 2020-01-01 RX ADMIN — MORPHINE SULFATE 0.5 MG/HR: 50 CAPSULE, EXTENDED RELEASE ORAL at 17:59

## 2020-01-01 RX ADMIN — Medication 3 MILLILITER(S): at 05:04

## 2020-01-01 RX ADMIN — SODIUM CHLORIDE 1750 MILLILITER(S): 9 INJECTION INTRAMUSCULAR; INTRAVENOUS; SUBCUTANEOUS at 14:29

## 2020-01-01 RX ADMIN — PHENYLEPHRINE HYDROCHLORIDE 2.02 MICROGRAM(S)/KG/MIN: 10 INJECTION INTRAVENOUS at 10:40

## 2020-01-01 RX ADMIN — Medication 100 MILLIGRAM(S): at 18:43

## 2020-01-01 RX ADMIN — LIDOCAINE 1 APPLICATION(S): 4 CREAM TOPICAL at 13:02

## 2020-01-01 RX ADMIN — TAMSULOSIN HYDROCHLORIDE 0.4 MILLIGRAM(S): 0.4 CAPSULE ORAL at 22:50

## 2020-01-01 RX ADMIN — CEFEPIME 100 MILLIGRAM(S): 1 INJECTION, POWDER, FOR SOLUTION INTRAMUSCULAR; INTRAVENOUS at 11:14

## 2020-01-01 RX ADMIN — Medication 3 MILLILITER(S): at 21:00

## 2020-01-01 RX ADMIN — Medication 3 MILLILITER(S): at 22:08

## 2020-01-01 RX ADMIN — AZITHROMYCIN 255 MILLIGRAM(S): 500 TABLET, FILM COATED ORAL at 16:39

## 2020-01-01 RX ADMIN — Medication 40 MILLIGRAM(S): at 09:26

## 2020-01-01 RX ADMIN — Medication 25 MILLIGRAM(S): at 11:42

## 2020-01-01 RX ADMIN — Medication 3 MILLILITER(S): at 04:16

## 2020-01-01 RX ADMIN — AZITHROMYCIN 255 MILLIGRAM(S): 500 TABLET, FILM COATED ORAL at 14:54

## 2020-01-01 RX ADMIN — Medication 104 MILLIGRAM(S): at 07:44

## 2020-01-01 RX ADMIN — CEFEPIME 100 MILLIGRAM(S): 1 INJECTION, POWDER, FOR SOLUTION INTRAMUSCULAR; INTRAVENOUS at 06:49

## 2020-01-01 RX ADMIN — Medication 3 MILLILITER(S): at 15:23

## 2020-01-01 RX ADMIN — Medication 50 MILLILITER(S): at 06:56

## 2020-01-01 RX ADMIN — Medication 5 MILLIGRAM(S): at 06:26

## 2020-01-01 RX ADMIN — SODIUM CHLORIDE 1650 MILLILITER(S): 9 INJECTION INTRAMUSCULAR; INTRAVENOUS; SUBCUTANEOUS at 13:43

## 2020-01-01 RX ADMIN — CEFEPIME 100 MILLIGRAM(S): 1 INJECTION, POWDER, FOR SOLUTION INTRAMUSCULAR; INTRAVENOUS at 17:07

## 2020-01-01 RX ADMIN — SODIUM CHLORIDE 1650 MILLILITER(S): 9 INJECTION INTRAMUSCULAR; INTRAVENOUS; SUBCUTANEOUS at 14:29

## 2020-01-01 RX ADMIN — Medication 50 MILLIGRAM(S): at 22:00

## 2020-01-01 RX ADMIN — MORPHINE SULFATE 5 MG/HR: 50 CAPSULE, EXTENDED RELEASE ORAL at 13:49

## 2020-01-01 RX ADMIN — SENNA PLUS 1 TABLET(S): 8.6 TABLET ORAL at 22:50

## 2020-01-01 RX ADMIN — MORPHINE SULFATE 5 MG/HR: 50 CAPSULE, EXTENDED RELEASE ORAL at 00:16

## 2020-01-01 RX ADMIN — Medication 100 MILLIGRAM(S): at 06:26

## 2020-01-01 RX ADMIN — MORPHINE SULFATE 2 MILLIGRAM(S): 50 CAPSULE, EXTENDED RELEASE ORAL at 12:23

## 2020-01-01 RX ADMIN — Medication 104 MILLIGRAM(S): at 18:15

## 2020-01-01 RX ADMIN — ATORVASTATIN CALCIUM 20 MILLIGRAM(S): 80 TABLET, FILM COATED ORAL at 22:50

## 2020-01-01 RX ADMIN — Medication 1 MILLIGRAM(S): at 05:20

## 2020-01-01 RX ADMIN — Medication 100 GRAM(S): at 09:00

## 2020-01-06 PROBLEM — R06.89 HYPERCAPNEMIA: Status: ACTIVE | Noted: 2020-01-01

## 2020-01-06 PROBLEM — J44.9 ADVANCED COPD: Status: ACTIVE | Noted: 2018-10-12

## 2020-01-06 PROBLEM — R13.10 DYSPHAGIA: Status: ACTIVE | Noted: 2018-01-26

## 2020-01-06 NOTE — REVIEW OF SYSTEMS
[Hemoptysis] : no hemoptysis [Dyspnea] : no dyspnea [Negative] : Sleep Disorder [FreeTextEntry8] : dyspnea is at baseline.  hemoptysis resolved

## 2020-01-06 NOTE — PHYSICAL EXAM
[Normal Appearance] : normal appearance [General Appearance - Well Developed] : well developed [Well Groomed] : well groomed [General Appearance - Well Nourished] : well nourished [General Appearance - In No Acute Distress] : no acute distress [No Deformities] : no deformities [Normal Conjunctiva] : the conjunctiva exhibited no abnormalities [Eyelids - No Xanthelasma] : the eyelids demonstrated no xanthelasmas [Normal Oropharynx] : normal oropharynx [Neck Appearance] : the appearance of the neck was normal [Neck Cervical Mass (___cm)] : no neck mass was observed [Jugular Venous Distention Increased] : there was no jugular-venous distention [Thyroid Nodule] : there were no palpable thyroid nodules [Thyroid Diffuse Enlargement] : the thyroid was not enlarged [Heart Rate And Rhythm] : heart rate and rhythm were normal [Murmurs] : no murmurs present [Heart Sounds] : normal S1 and S2 [] : no respiratory distress [Respiration, Rhythm And Depth] : normal respiratory rhythm and effort [Exaggerated Use Of Accessory Muscles For Inspiration] : no accessory muscle use [Auscultation Breath Sounds / Voice Sounds] : lungs were clear to auscultation bilaterally [Kyphosis] : kyphosis [FreeTextEntry1] : walks with cane [Nail Clubbing] : no clubbing of the fingernails [Cyanosis, Localized] : no localized cyanosis [1+ Pitting] : 1+  pitting [Deep Tendon Reflexes (DTR)] : deep tendon reflexes were 2+ and symmetric [Sensation] : the sensory exam was normal to light touch and pinprick [No Focal Deficits] : no focal deficits [Oriented To Time, Place, And Person] : oriented to person, place, and time [Impaired Insight] : insight and judgment were intact [Affect] : the affect was normal

## 2020-01-21 NOTE — ED ADULT NURSE NOTE - OBJECTIVE STATEMENT
84 yr old male BIB EMS from [facility] for hypotension, lethargy and shortness of breath x 3 days per pt's wife and daughter. Pt lethargic, hypothermic and hypotensive. Per pt's family pt has been getting worse at the Rehab center. PMHX: COPD, CVA, Seizures, HTN, Esophageal CA. DR. Colletta evaluating, sepsis panel sent to lab. BCX2, Lactate, UA/C&S sent. Continuous monitoring.

## 2020-01-21 NOTE — CONSULT NOTE ADULT - ASSESSMENT
A/P:    83 yo M with PMHx of esophageal cancer s/p esophagectomy (2017), CAD s/p stents, severe AS with recent cath, HTN, HLD, COPD on home O2 presents from Rehab facility for evaluation of AMS, respiratory distress, hypotension and hypothermia, shock of unclear etiology, requiring pressors    #neuro: at baseline at this time per family, c/w dilantin  #cv: elevated BNP, B lines on bedside ultrasound, though mental status and breathing improved with IVF.  Would continue with phenylephrine (tolerated this well, must be mindful of afterload effects and switch to levo prn), hold off on diuretics as patient improved with IVF, but monitor uop.  Trend cardiac enzymes, get repeat tte  #pulm: do not suspect copd exacerbation as patient not hypercapnic, not wheezing per family.  Would hold off on IV steroids.  c/w nebulizers, c/w nightly cpap.   #ID: r/o sepsis, s/p levaquin. f/u ucx/bcx, c/w levaquin for now, check qtc  #renal: creatinine slightly elevated, will trend for now, check repeat BMP tonight.  may need diuretics if uop drops.  #GI no acute issues, diet as tolerated  #heme: trend h/h    d/w patient and family via telemedicine    CRITICAL CARE TIME SPENT: 60 minutes    This visit was provided via telemedicine. Patient was located at:  [facility] ED. [address] [place] [address]  Provider was located at [facility].[address] [place] [address] for the visit. A/P:    85 yo M with PMHx of esophageal cancer s/p esophagectomy (2017), CAD s/p stents, severe AS with recent cath, HTN, HLD, COPD on home O2 presents from Rehab facility for evaluation of AMS, respiratory distress, hypotension and hypothermia, shock of unclear etiology, requiring pressors, concern for severe sepsis with septic shock    #neuro: at baseline at this time per family, c/w dilantin  #cv: elevated BNP, B lines on bedside ultrasound, though mental status and breathing improved with IVF.  Would continue with phenylephrine (tolerated this well, must be mindful of afterload effects and switch to levo prn), hold off on diuretics as patient improved with IVF, but monitor uop.  Trend cardiac enzymes, get repeat tte  #pulm: do not suspect copd exacerbation as patient not hypercapnic, not wheezing per family.  Would hold off on IV steroids.  c/w nebulizers, c/w nightly cpap. chronic hypoxic respiratory failure.  #ID: r/o sepsis, s/p levaquin. f/u ucx/bcx, c/w levaquin for now, check qtc. possible severe sepsis with septic shock.  #renal: creatinine slightly elevated, will trend for now, check repeat BMP tonight.  may need diuretics if uop drops.  #GI no acute issues, diet as tolerated  #heme: trend h/h    d/w patient and family via telemedicine    CRITICAL CARE TIME SPENT: 60 minutes    This visit was provided via telemedicine. Patient was located at:  [facility] ED. [address] [place] [address]  Provider was located at [facility].[address] [place] [address] for the visit.

## 2020-01-21 NOTE — ED PROVIDER NOTE - CARE PLAN
Principal Discharge DX:	Sepsis Principal Discharge DX:	Severe sepsis  Secondary Diagnosis:	Hypothermia, initial encounter

## 2020-01-21 NOTE — H&P ADULT - NSHPPHYSICALEXAM_GEN_ALL_CORE
Vital Signs Last 24 Hrs  T(C): 35 (21 Jan 2020 17:19), Max: 36.1 (21 Jan 2020 13:22)  T(F): 95 (21 Jan 2020 17:19), Max: 97 (21 Jan 2020 13:22)  HR: 64 (21 Jan 2020 17:33) (49 - 75)  BP: 83/56 (21 Jan 2020 17:33) (75/50 - 115/66)  BP(mean): 66 (21 Jan 2020 17:33) (59 - 66)  RR: 19 (21 Jan 2020 17:33) (12 - 33)  SpO2: 97% (21 Jan 2020 17:33) (90% - 100%)    Physical Examination:  GENERAL:               Alert  HEENT:                    Pupils equal, reactive to light.  Symmetric. No JVD, Moist MM  PULM:                     Tachypneic, purse lips breathing, breath sounds diminished - O2 via NC  CVS:                         S1, S2,  No Murmur  ABD:                        Soft, nondistended, nontender, normoactive bowel sounds,   EXT:                         No edema, nontender, No Cyanosis or Clubbing   Vascular:                Warm Extremities, Normal Capillary refill, Normal Distal Pulses  SKIN:                       Warm, mild pallor, no rashes noted.   NEURO:                  Alert, oriented to self and family. Intermittently agitated.   PSYC:                      Calm

## 2020-01-21 NOTE — H&P ADULT - NSICDXPASTMEDICALHX_GEN_ALL_CORE_FT
PAST MEDICAL HISTORY:  Abnormal LFTs     Advanced COPD     Anemia, unspecified type     Anxiety     Anxiety     Aortic valve stenosis, etiology of cardiac valve disease unspecified     Asthma     BCC (basal cell carcinoma) skin    BPH (benign prostatic hyperplasia)     Cerebrovascular accident (CVA)     Cerebrovascular accident (CVA) due to embolism of cerebral artery     Chronic allergic rhinitis     Coronary artery disease s/p 3 stents on June 30, 2017 at [place] Dr. Lavelle Reid    Esophagus cancer     Former smoker, stopped smoking in distant past     Herpes zoster without complication     HTN (hypertension)     Hyperlipidemia     Hypothyroidism, unspecified type     Inguinal hernia     OA (osteoarthritis) of knee right    SOFIA (obstructive sleep apnea)     Pulmonary nodule     Rheumatoid arthritis     Seizure disorder

## 2020-01-21 NOTE — ED PROVIDER NOTE - PROGRESS NOTE DETAILS
GORGE Patino - seen by ICU GORGE Anaya and accepted; will admit to DR Valerio. Family declined central line for pressors, also declined LP;

## 2020-01-21 NOTE — ED PROVIDER NOTE - CRITICAL CARE PROVIDED
direct patient care (not related to procedure)/documentation/conducted a detailed discussion of DNR status/consult w/ pt's family directly relating to pts condition/interpretation of diagnostic studies/consultation with other physicians/additional history taking

## 2020-01-21 NOTE — ED ADULT NURSE NOTE - NSIMPLEMENTINTERV_GEN_ALL_ED
Implemented All Universal Safety Interventions:  [place] to call system. Call bell, personal items and telephone within reach. Instruct patient to call for assistance. Room bathroom lighting operational. Non-slip footwear when patient is off stretcher. Physically safe environment: no spills, clutter or unnecessary equipment. Stretcher in lowest position, wheels locked, appropriate side rails in place.

## 2020-01-21 NOTE — ED PROVIDER NOTE - CLINICAL SUMMARY MEDICAL DECISION MAKING FREE TEXT BOX
Pt is 83 y/o male with PMHx of HTN, HLD, seizure disorder, COPD, esophageal ca s/p esophagectomy, CAD,  CVA from [facility] for rehabilitation here for eval of worsening mental status x 3 days. As per family pt's mental status has been declining since pt's fell in Dec of last year, had hip fracture, complicated by aspiration of food particle for which pt had to be transferred to [facility] and stayed in ICU for "CO problems - he had to be put on BIPAP); As per family pt has been more lethargic since the fall, with intermittent hallucinations, worse for the past 3 days. Ambulatory before the fall, now mostly bed bound; (+) cough productive of clear phlegm. No recent falls reported. pt himself is poor historian. Noted to be hypotensive, lethargic, hypothermic, most likely infectious source, sepsis bundle initiated - CT head, labs, fluids, abx, will admit

## 2020-01-21 NOTE — H&P ADULT - NSICDXPASTSURGICALHX_GEN_ALL_CORE_FT
PAST SURGICAL HISTORY:  Anastomotic leak following esophagectomy [facility] esophagectomy    H/O heart artery stent June 30, 2017    History of hernia repair     History of tonsillectomy     History of total hip replacement left    Knee arthropathy left

## 2020-01-21 NOTE — H&P ADULT - ASSESSMENT
ASSESSMENT:  1. ?Sepsis - hypothermia and hypotension  2. Severe AS  3. Compensated Metabolic Acidosis      PLAN:  - Broad spectrum antibiotics ASSESSMENT:  1. ?Sepsis - hypothermia and hypotension  2. Severe AS  3. Compensated Metabolic Acidosis  4. COPD      PLAN:  - Broad spectrum antibiotics  - Peripheral Phenylephrine -  titrate to MAP of 65  - Trends labs  - Val hugger  - Hold IVF, consider warmed fluids if needed while hypothermic  - Follow RVP, Blood and Urine cultures  - Will consider diuretics if signs of volume overload develop as POCUS reveals Bline predominance and poor LV contractility  - NPO - speech and swallow tomorrow  - Cards consult  - PPX: Heparin and Protonix  - GOC: Full CODE

## 2020-01-21 NOTE — ED ADULT NURSE NOTE - CHIEF COMPLAINT QUOTE
Pt [facility] fro [facility], pt getting more lethargic with low BP/SOB for the last few days, c/o headache for 2 days, FS 98 per EMS

## 2020-01-21 NOTE — ED PROVIDER NOTE - ATTENDING CONTRIBUTION TO CARE
I personally evaluated the patient. I reviewed the Resident’s or Physician Assistant’s note (as assigned above), and agree with the findings and plan except as documented in my note.    Patient has been lethargic . Hypothermic presentation     PE: lethargic  Lungs : clear   Heart : RR    will place on bear hugger -treat for sepsis .  Family refused central line and pressors

## 2020-01-21 NOTE — CONSULT NOTE ADULT - MINUTES
Was notified by materials management that her tube will not arrive until 5/27/19 so will need to reschedule procedure. It was noted during her last hospitalization that she is not using her PEG or formula. Spoke with mom, Timothy is not living at home at this time. She does not believe Timothy is using her tube and does want it removed. Advised mom we need to see Timothy in the office to talk about removing her her tube if that is what she wants. If she does want to keep her tube we will need to reschedule her procedure because her tube will not arrive until 5/27/19.  Mom voiced understanding and will talk to Timothy and call back 60

## 2020-01-21 NOTE — ED ADULT NURSE NOTE - PMH
Abnormal LFTs    Advanced COPD    Anemia, unspecified type    Anxiety    Anxiety    Aortic valve stenosis, etiology of cardiac valve disease unspecified    Asthma    BCC (basal cell carcinoma)  skin  BPH (benign prostatic hyperplasia)    Cerebrovascular accident (CVA)    Cerebrovascular accident (CVA) due to embolism of cerebral artery    Chronic allergic rhinitis    Coronary artery disease  s/p 3 stents on June 30, 2017 at [facility] Dr. Lavelle Reid  Esophagus cancer    Former smoker, stopped smoking in distant past    Herpes zoster without complication    HTN (hypertension)    Hyperlipidemia    Hypothyroidism, unspecified type    Inguinal hernia    OA (osteoarthritis) of knee  right  SOFIA (obstructive sleep apnea)    Pulmonary nodule    Rheumatoid arthritis    Seizure disorder

## 2020-01-21 NOTE — ED PROVIDER NOTE - PMH
Abnormal LFTs    Advanced COPD    Anemia, unspecified type    Anxiety    Anxiety    Aortic valve stenosis, etiology of cardiac valve disease unspecified    Asthma    BCC (basal cell carcinoma)  skin  BPH (benign prostatic hyperplasia)    Cerebrovascular accident (CVA)    Cerebrovascular accident (CVA) due to embolism of cerebral artery    Chronic allergic rhinitis    Coronary artery disease  s/p 3 stents on June 30, 2017 at [facility] Dr. Lavelle Reid  Esophagus cancer    Former smoker, stopped smoking in distant past    Herpes zoster without complication    HTN (hypertension)    Hyperlipidemia    Hypothyroidism, unspecified type    Inguinal hernia    OA (osteoarthritis) of knee  right  SOFIA (obstructive sleep apnea)    Pulmonary nodule    Rheumatoid arthritis    Seizure disorder

## 2020-01-21 NOTE — ED ADULT TRIAGE NOTE - CHIEF COMPLAINT QUOTE
[facility], pt getting more lethargic with low BP/SOB for the last few days, c/o headache for 2 days Pt BIBA fro [facility], pt getting more lethargic with low BP/SOB for the last few days, c/o headache for 2 days, FS 98 per EMS

## 2020-01-21 NOTE — H&P ADULT - HISTORY OF PRESENT ILLNESS
83 yo M with PMHx of esophageal cancer s/p esophagectomy (2017), CAD s/p stents, severe AS with recent cath, HTN, HLD, COPD on home O2 presents from Rehab facility for evaluation of AMS, respiratory distress, hypotension and hypothermia. Patient has been having symptoms for 3 days according to family, with progressive worsening. Family reports prior hospitalization for food bolus requiring upper endoscopy for retrieval was complicated post procedurally by hypercarbia and AMS - pertinent as family reports prior episode of AMS was secondary to hypercarbia and his current mentation is similar to that episode. There has been no fevers, sweats, chills, N/V/D, chest pain, abd pain, SOB, rash, hematuria/dysuria.    Initial work-up in ED reveals: H&H 8.9/28.8, LA 0.8, WBC 4.2, VBG 7.28/60/65, CXR and CTH neg, ECG without acute findings - unchanged from prior. In ED patient given 2L IVF bolus, Levaquin and Azithromycin, Val narayanan applied.

## 2020-01-21 NOTE — CONSULT NOTE ADULT - SUBJECTIVE AND OBJECTIVE BOX
REASON FOR CONSULT: hypotension    Chief Complaint: weakness    HPI:  85 yo M with PMHx of esophageal cancer s/p esophagectomy (2017), CAD s/p stents, severe AS with recent cath, HTN, HLD, COPD on home O2 presents from Rehab facility for evaluation of AMS, respiratory distress, hypotension and hypothermia.  Patient's family and patient states symptoms of weakness started 3 days ago including confusion, but worsened today.  Had hypercapnia in past requiring hospitalization and this reminds them of that.  No fevers/chills.  Had some sob today.  Never hospitalized for COPD exacerbation.  Has known severe AS.  Takes CPAP at night, chronic prednisone.    Initial work-up in ED reveals: H&H 8.9/28.8, LA 0.8, WBC 4.2, VBG 7.28/60/65, CXR and CTH neg, ECG without acute findings - unchanged from prior. In ED patient given 2L IVF bolus, Levaquin and Azithromycin, Val hugger applied.     Patient breathing and mental status much improved after IVF and pressors, near baseline now per family.          PAST MEDICAL & SURGICAL HISTORY:  Inguinal hernia  Anxiety  Abnormal LFTs  Cerebrovascular accident (CVA) due to embolism of cerebral artery  Herpes zoster without complication  Anemia, unspecified type  SOFIA (obstructive sleep apnea)  Hypothyroidism, unspecified type  Pulmonary nodule  Aortic valve stenosis, etiology of cardiac valve disease unspecified  Advanced COPD  Coronary artery disease: s/p 3 stents on 2017 at [place] Dr. Lavelle Reid  OA (osteoarthritis) of knee: right  Former smoker, stopped smoking in distant past  Rheumatoid arthritis  Seizure disorder  Asthma  Hyperlipidemia  BPH (benign prostatic hyperplasia)  HTN (hypertension)  Esophagus cancer  Chronic allergic rhinitis  BCC (basal cell carcinoma): skin  Cerebrovascular accident (CVA)  Anxiety  Anastomotic leak following esophagectomy: [facility] esophagectomy  History of tonsillectomy  H/O heart artery stent: 2017  Knee arthropathy: left  History of total hip replacement: left  History of hernia repair    Allergies    penicillin (Rash)    Intolerances      FAMILY HISTORY:  Family history of heart attack  Family history of pulmonary embolism: father @ 49 yr old  FH: CAD (coronary artery disease) (Sibling)      Review of Systems:  CONSTITUTIONAL: No fever, chills, or fatigue  EYES: No eye pain, visual disturbances, or discharge  ENMT:  No difficulty hearing, tinnitus, vertigo; No sinus or throat pain  NECK: No pain or stiffness  RESPIRATORY: see hpi  CARDIOVASCULAR: No chest pain, palpitations, dizziness, or leg swelling  GASTROINTESTINAL: No abdominal or epigastric pain. No nausea, vomiting, or hematemesis; No diarrhea or constipation. No melena or hematochezia.  GENITOURINARY: No dysuria, frequency, hematuria, or incontinence  NEUROLOGICAL: No headaches, memory loss, loss of strength, numbness, or tremors  SKIN: No itching, burning, rashes, or lesions   MUSCULOSKELETAL: No joint pain or swelling; No muscle, back, or extremity pain  PSYCHIATRIC: No depression, anxiety, mood swings, or difficulty sleeping      Medications:  azithromycin  IVPB 500 milliGRAM(s) IV Intermittent every 24 hours    phenylephrine    Infusion 0.1 MICROgram(s)/kG/Min IV Continuous <Continuous>  tamsulosin 0.4 milliGRAM(s) Oral at bedtime    albuterol/ipratropium for Nebulization 3 milliLiter(s) Nebulizer every 6 hours  montelukast 10 milliGRAM(s) Oral at bedtime    acetaminophen   Tablet .. 650 milliGRAM(s) Oral every 6 hours PRN  escitalopram 5 milliGRAM(s) Oral daily  phenytoin   Chewable 50 milliGRAM(s) Chew daily  phenytoin  Oral Chewable Tab - Peds 100 milliGRAM(s) Chew two times a day      clopidogrel Tablet 75 milliGRAM(s) Oral daily    pantoprazole    Tablet 40 milliGRAM(s) Oral before breakfast  senna 1 Tablet(s) Oral at bedtime      atorvastatin 20 milliGRAM(s) Oral at bedtime  finasteride 5 milliGRAM(s) Oral daily  levothyroxine 50 MICROGram(s) Oral daily  predniSONE   Tablet 5 milliGRAM(s) Oral daily    ferrous    sulfate 325 milliGRAM(s) Oral daily                ICU Vital Signs Last 24 Hrs  T(C): 35 (2020 17:19), Max: 36.1 (2020 13:22)  T(F): 95 (2020 17:19), Max: 97 (2020 13:22)  HR: 63 (2020 18:00) (49 - 75)  BP: 90/64 (2020 18:00) (75/50 - 115/66)  BP(mean): 73 (2020 18:00) (59 - 73)  ABP: --  ABP(mean): --  RR: 25 (2020 18:00) (12 - 33)  SpO2: 100% (2020 18:00) (90% - 100%)    Vital Signs Last 24 Hrs  T(C): 35 (2020 17:19), Max: 36.1 (2020 13:22)  T(F): 95 (2020 17:19), Max: 97 (2020 13:22)  HR: 63 (2020 18:00) (49 - 75)  BP: 90/64 (2020 18:00) (75/50 - 115/66)  BP(mean): 73 (2020 18:00) (59 - 73)  RR: 25 (2020 18:00) (12 - 33)  SpO2: 100% (2020 18:00) (90% - 100%)    ABG - ( 2020 16:20 )  pH, Arterial: 7.27  pH, Blood: x     /  pCO2: 27    /  pO2: 57    / HCO3: x     / Base Excess: x     /  SaO2: 87                  I&O's Detail    2020 07:01  -  2020 18:32  --------------------------------------------------------  IN:    phenylephrine   Infusion: 2 mL  Total IN: 2 mL    OUT:  Total OUT: 0 mL    Total NET: 2 mL            LABS:                        8.9    4.26  )-----------( 97       ( 2020 13:30 )             28.8         139  |  101  |  23  ----------------------------<  74  4.3   |  33<H>  |  1.02    Ca    8.6      2020 13:30    TPro  6.5  /  Alb  2.6<L>  /  TBili  0.2  /  DBili  x   /  AST  36  /  ALT  27  /  AlkPhos  274<H>  -      CARDIAC MARKERS ( 2020 13:30 )  .019 ng/mL / x     / x     / x     / x          CAPILLARY BLOOD GLUCOSE        PT/INR - ( 2020 13:30 )   PT: 12.1 sec;   INR: 1.08 ratio         PTT - ( 2020 13:30 )  PTT:44.1 sec  Urinalysis Basic - ( 2020 14:50 )    Color: Yellow / Appearance: Clear / S.015 / pH: x  Gluc: x / Ketone: Negative  / Bili: Negative / Urobili: Negative   Blood: x / Protein: Negative / Nitrite: Negative   Leuk Esterase: Negative / RBC: x / WBC x   Sq Epi: x / Non Sq Epi: x / Bacteria: x      CULTURES:      Physical Examination:  Physical exam as per bedside MD (direct physical examination could not be performed because the visit was provided via Telemedicine):       RADIOLOGY: cxr: no acute pathology

## 2020-01-21 NOTE — PROGRESS NOTE ADULT - ASSESSMENT
84 year old male admitted with altered mental status with hypotension and hypothermia and shock of unclear etiology, requiring pressors with concern for severe sepsis.     Plan:   continue BIPAP overnight for SOFIA no signs of respiratory distress   continue IV pressors and will titrate to MAP of 65-70  keep urine out put > 0.5cc/kg/hr   blood/urine/sputum cultures, then initiate broad spectrum antibiotics  follow cultures and continue antibiotics but will narrow as able  procalcitonin level with morning labs   considering elevated ProBnp no signs of respiratory distress or pulmonary edema lungs are clear   NPO with speech and swallow eval tomorrow  continue prophylactic measures

## 2020-01-21 NOTE — PROGRESS NOTE ADULT - SUBJECTIVE AND OBJECTIVE BOX
84y  Male  penicillin (Rash)    CC: Patient is a 84y old  Male who presents with a chief complaint of hypothermia and hypotension     HPI:  84 year old male with PMHx of esophageal cancer s/p esophagectomy (2017), CAD s/p stents, severe AS with recent cath, HTN, HLD, COPD on home O2 who  presented today  from a local Rehab facility for evaluation of AMS with respiratory distress, hypotension and hypothermia which may have been going on for 3 days . No recent fevers, sweats, chills, N/V/D, chest pain, abd pain or SOB at the facility.     He was admitted for AMS, respiratory distress, hypotension and hypothermia with shock of unclear etiology, No leukocytosis, lactate normal no apparent consolidation on chest xray, UA negative for UTI and remaining cultures pending  He was started on pressors with concern for severe sepsis and tonight his altered mental status has improved ( he is awake and following commands), his hypothermia has resolved (bare hugger has been removed and his hypotension is improving with decreasing dose of IV pressors over past few hours. He remains on BIPAP tonight for SOFIA    PAST MEDICAL & SURGICAL HISTORY:  Inguinal hernia  Anxiety  Abnormal LFTs  Cerebrovascular accident (CVA) due to embolism of cerebral artery  Herpes zoster without complication  Anemia, unspecified type  SOFIA (obstructive sleep apnea)  Hypothyroidism, unspecified type  Pulmonary nodule  Aortic valve stenosis, etiology of cardiac valve disease unspecified  Advanced COPD  Coronary artery disease: s/p 3 stents on 2017 at [place] Dr. Lavelle Reid  OA (osteoarthritis) of knee: right  Former smoker, stopped smoking in distant past  Rheumatoid arthritis  Seizure disorder  Asthma  Hyperlipidemia  BPH (benign prostatic hyperplasia)  HTN (hypertension)  Esophagus cancer  Chronic allergic rhinitis  BCC (basal cell carcinoma): skin  Cerebrovascular accident (CVA)  Anxiety  Anastomotic leak following esophagectomy: Vance logan esophagectomy  History of tonsillectomy  H/O heart artery stent: 2017  Knee arthropathy: left  History of total hip replacement: left  History of hernia repair    FAMILY HISTORY:  Family history of heart attack  Family history of pulmonary embolism: father @ 49 yr old  FH: CAD (coronary artery disease) (Sibling)    Vital Signs Last 24 Hrs  T(C): 36.6 (2020 19:00), Max: 36.6 (2020 19:00)  T(F): 97.9 (2020 19:00), Max: 97.9 (2020 19:00)  HR: 67 (2020 23:10) (49 - 75)  BP: 107/61 (2020 21:30) (75/50 - 115/66)  BP(mean): 75 (2020 21:30) (59 - 83)  RR: 40 (2020 21:00) (12 - 40)  SpO2: 99% (2020 23:10) (90% - 100%)  ABG - ( 2020 16:20 )  pH, Arterial: 7.27  pH, Blood: x     /  pCO2: 27    /  pO2: 57    / HCO3: x     / Base Excess: x     /  SaO2: 87        I&O's Summary  2020 07:01  -  2020 23:31  --------------------------------------------------------  IN: 2 mL / OUT: 0 mL / NET: 2 mL    LABS      139  |  101  |  23  ----------------------------<  74  4.3   |  33<H>  |  1.02    Ca    8.6      2020 13:30    TPro  6.5  /  Alb  2.6<L>  /  TBili  0.2  /  DBili  x   /  AST  36  /  ALT  27  /  AlkPhos  274<H>                        8.9    4.26  )-----------( 97       ( 2020 13:30 )             28.8     PT/INR - ( 2020 13:30 )   PT: 12.1 sec;   INR: 1.08 ratio    PTT - ( 2020 13:30 )  PTT:44.1 sec  CARDIAC MARKERS ( 2020 13:30 )  .019 ng/mL / x     / x     / x     / x        LIVER FUNCTIONS - ( 2020 13:30 )  Alb: 2.6 g/dL / Pro: 6.5 g/dL / ALK PHOS: 274 U/L / ALT: 27 U/L / AST: 36 U/L / GGT: x           Urinalysis Basic - ( 2020 14:50 )  Color: Yellow / Appearance: Clear / S.015 / pH: x  Gluc: x / Ketone: Negative  / Bili: Negative / Urobili: Negative   Blood: x / Protein: Negative / Nitrite: Negative   Leuk Esterase: Negative / RBC: x / WBC x   Sq Epi: x / Non Sq Epi: x / Bacteria: x    MEDICATIONS  (STANDING):  albuterol/ipratropium for Nebulization 3 milliLiter(s) Nebulizer every 6 hours  atorvastatin 20 milliGRAM(s) Oral at bedtime  azithromycin  IVPB 500 milliGRAM(s) IV Intermittent every 24 hours  clopidogrel Tablet 75 milliGRAM(s) Oral daily  escitalopram 5 milliGRAM(s) Oral daily  ferrous    sulfate 325 milliGRAM(s) Oral daily  finasteride 5 milliGRAM(s) Oral daily  levothyroxine 50 MICROGram(s) Oral daily  montelukast 10 milliGRAM(s) Oral at bedtime  pantoprazole    Tablet 40 milliGRAM(s) Oral before breakfast  phenylephrine    Infusion 0.1 MICROgram(s)/kG/Min (2.025 mL/Hr) IV Continuous <Continuous>  phenytoin   Chewable 50 milliGRAM(s) Chew daily  phenytoin  Oral Chewable Tab - Peds 100 milliGRAM(s) Chew two times a day  predniSONE   Tablet 5 milliGRAM(s) Oral daily  senna 1 Tablet(s) Oral at bedtime  tamsulosin 0.4 milliGRAM(s) Oral at bedtime      REVIEW OF SYSTEMS:    CONSTITUTIONAL: No fever, weight loss, or fatigue  EYES: No eye pain, visual disturbances, or discharge  ENMT:  No difficulty hearing, tinnitus, vertigo; No sinus or throat pain  NECK: No pain or stiffness  BREASTS: No pain, masses, or nipple discharge  RESPIRATORY: No cough, wheezing, chills or hemoptysis; No shortness of breath  CARDIOVASCULAR: No chest pain, palpitations, dizziness, or leg swelling  GASTROINTESTINAL: No abdominal or epigastric pain. No nausea, vomiting, or hematemesis; No diarrhea or constipation. No melena or hematochezia.  GENITOURINARY: No dysuria, frequency, hematuria, or incontinence  NEUROLOGICAL: No headaches, memory loss, loss of strength, numbness, or tremors  SKIN: No itching, burning, rashes, or lesions   LYMPH NODES: No enlarged glands  ENDOCRINE: No heat or cold intolerance; No hair loss  MUSCULOSKELETAL: No joint pain or swelling; No muscle, back, or extremity pain  PSYCHIATRIC: No depression, anxiety, mood swings, or difficulty sleeping  HEME/LYMPH: No easy bruising, or bleeding gums  ALLERY AND IMMUNOLOGIC: No hives or eczema      Physicial Exam:     Constitutional: NAD, well-groomed, well-developed  HEENT: PERRLA, EOMI, no drainage or redness  Neck: No bruits; no thyromegaly or nodules,  No JVD  Back: Normal spine flexure, No CVA tenderness, No deformity or limitation of movement  Respiratory: Breath Sounds equal & clear to percussion & auscultation, no accessory muscle use  Cardiovascular: Regular rate & rhythm, normal S1, S2; no murmurs, gallops or rubs; no S3, S4  Gastrointestinal: Soft, non-tender, non distended no hepatosplenomegaly, normal bowel sounds  Extremities: No peripheral edema, No cyanosis, clubbing   Vascular: Equal and normal pulses: 2+ peripheral pulses throughout  Neurological: GCS:    A&O x 3; no sensory, motor  deficits, normal reflexes  Psychiatric: Normal mood, normal affect  Musculoskeletal: No joint pain, swelling or deformity; no limitation of movement  Skin: No rashes

## 2020-01-21 NOTE — ED PROVIDER NOTE - OBJECTIVE STATEMENT
Pt is 83 y/o male with PMHx of HTN, HLD, seizure disorder, COPD, esophageal ca s/p esophagectomy, CAD,  CVA from [facility] for rehabilitation here for eval of worsening mental status x 3 days. As per family pt's mental status has been declining since pt's fell in Dec of last year, had hip fracture, complicated by aspiration of food particle for which pt had to be transferred to [facility] and stayed in ICU for "CO problems - he had to be put on BIPAP); As per family pt has been more lethargic since the fall, with intermittent hallucinations, worse for the past 3 days. Ambulatory before the fall, now mostly bed bound; (+) cough productive of clear phlegm. No recent falls reported. pt himself is poor historian;

## 2020-01-22 NOTE — DIETITIAN INITIAL EVALUATION ADULT. - PERTINENT MEDS FT
Plavix, Zithromax, Tylenol, Lipitor, Lexapro, Ferrous Sulfate., Proscar, Synthroid, Singular, Protonix, Phenylephrine, Dilantin, Senna, Flomax

## 2020-01-22 NOTE — CHART NOTE - NSCHARTNOTEFT_GEN_A_CORE
Upon Nutritional Assessment by the Registered Dietitian your patient was determined to meet criteria / has evidence of the following diagnosis/diagnoses:              [X] Severe Protein Calorie Malnutrition    Findings as based on:  [X] Comprehensive Nutrition Assessment   [X] Nutrition Focused Physical Exam - Loss of body fat & muscle wasting noted  Temporal, Orbital, Clavicle, Scapular, Bicep, Tricep, Thigh, Calf  Wasting Observed       Nutrition Plan/Recommendations:    1) Advance diet to Mechanical soft with Ensure Enlive BID,     PROVIDER Section:   By signing this assessment you are acknowledging and agree with the diagnosis/diagnoses assigned by the Registered Dietitian    Miguelina Corrales RDN

## 2020-01-22 NOTE — DIETITIAN INITIAL EVALUATION ADULT. - OTHER INFO
85 yo M with PMHx of esophageal cancer s/p esophagectomy (2017), CAD s/p stents, severe AS with recent cath, HTN, HLD, COPD on home O2 presents from Rehab facility for evaluation of AMS, respiratory distress, hypotension and hypothermia .Patient NPO at present , (+1) LE edema noted. Skin noted ecchymotic, Last noted BM (1/21) dentures noted. Unable to obtain diet / weight history due to no family at bedside.

## 2020-01-22 NOTE — PROGRESS NOTE ADULT - ASSESSMENT
Physical Examination:  GENERAL:               Arousable, mild distress  HEENT:                   Pupils equal, reactive to light.  Symmetric. No JVD, Moist MM  PULM:                     Poor air entry, No Rales, No Rhonchi, No Wheezing  CVS:                        S1, S2,  + Murmur  ABD:                        Soft, nondistended, nontender, normoactive bowel sounds,   EXT:                        No edema, nontender, No Cyanosis or Clubbing   Vascular:                 Cool to touch Extremities, Normal Distal Pulses  SKIN:                       Poor skin perfusion, no rashes noted.   NEURO:                  Arousable, withdraws to tactile stimuli   PSYC:                      No Insight.    Assessment:  1. Acute on chronic hypercapnic respiratory failure  2. Acute encephalopathy - likely metabolic  3. Septic shock  4. Severe aortic stenosis  5. Grade 2 diastolic heart failure  6. Seizure disorder  7. Hypothyroidism    Plan:  - Cont. bipap support  - IV vasopressor support to maintain MAP > 65  - Avoid further fluid resuscitation given severe aortic stenosis  - IV antibiotics, added cefepime for gram negative coverage   - Stress dose steroids, given patient on chronic steroids at home  - F/u culture results   - Hypothermia blanket   - López placed given poor urine output and worsening shock  - Change phenytoin to IV as not tolerating oral and family opted for no NGT placement  - Trend cardiac markers   - Over all prognosis is poor given multiple organ dysfunction  - Discussed with wife. DNR/DNI code status. Ok with IV antibiotics and IV fluids.

## 2020-01-22 NOTE — PROGRESS NOTE ADULT - SUBJECTIVE AND OBJECTIVE BOX
Follow-up Critical Care Progress Note  Chief Complaint : Sepsis    Lethargic but arousable. Mild distress. Remains in shock.     Allergies :penicillin (Rash)      PAST MEDICAL & SURGICAL HISTORY:  Inguinal hernia  Anxiety  Abnormal LFTs  Cerebrovascular accident (CVA) due to embolism of cerebral artery  Herpes zoster without complication  Anemia, unspecified type  SOFIA (obstructive sleep apnea)  Hypothyroidism, unspecified type  Pulmonary nodule  Aortic valve stenosis, etiology of cardiac valve disease unspecified  Advanced COPD  Coronary artery disease: s/p 3 stents on 2017 at [place] Dr. Lavelle Reid  OA (osteoarthritis) of knee: right  Former smoker, stopped smoking in distant past  Rheumatoid arthritis  Seizure disorder  Asthma  Hyperlipidemia  BPH (benign prostatic hyperplasia)  HTN (hypertension)  Esophagus cancer  Chronic allergic rhinitis  BCC (basal cell carcinoma): skin  Cerebrovascular accident (CVA)  Anxiety  Anastomotic leak following esophagectomy: Vance logan esophagectomy  History of tonsillectomy  H/O heart artery stent: 2017  Knee arthropathy: left  History of total hip replacement: left  History of hernia repair      Medications:  MEDICATIONS  (STANDING):  albuterol/ipratropium for Nebulization 3 milliLiter(s) Nebulizer every 6 hours  atorvastatin 20 milliGRAM(s) Oral at bedtime  azithromycin  IVPB 500 milliGRAM(s) IV Intermittent every 24 hours  cefepime   IVPB      clopidogrel Tablet 75 milliGRAM(s) Oral daily  dextrose 50% Injectable 50 milliLiter(s) IV Push once  escitalopram 5 milliGRAM(s) Oral daily  ferrous    sulfate 325 milliGRAM(s) Oral daily  finasteride 5 milliGRAM(s) Oral daily  hydrocortisone sodium succinate Injectable 50 milliGRAM(s) IV Push every 8 hours  levothyroxine 50 MICROGram(s) Oral daily  montelukast 10 milliGRAM(s) Oral at bedtime  pantoprazole    Tablet 40 milliGRAM(s) Oral before breakfast  phenylephrine    Infusion 0.1 MICROgram(s)/kG/Min (2.025 mL/Hr) IV Continuous <Continuous>  phenytoin   Chewable 50 milliGRAM(s) Chew daily  phenytoin  Oral Chewable Tab - Peds 100 milliGRAM(s) Chew two times a day  senna 1 Tablet(s) Oral at bedtime  tamsulosin 0.4 milliGRAM(s) Oral at bedtime    MEDICATIONS  (PRN):  acetaminophen   Tablet .. 650 milliGRAM(s) Oral every 6 hours PRN Temp greater or equal to 38C (100.4F), Mild Pain (1 - 3)      LABS:                        8.9    7.39  )-----------( 127      ( 2020 05:45 )             29.1         142  |  105  |  19  ----------------------------<  25<LL>  4.7   |  29  |  0.81    Ca    8.5      2020 05:45  Mg     2.5         TPro  6.6  /  Alb  2.6<L>  /  TBili  0.2  /  DBili  x   /  AST  34  /  ALT  25  /  AlkPhos  282<H>        CARDIAC MARKERS ( 2020 13:30 )  .019 ng/mL / x     / x     / x     / x            PT/INR - ( 2020 05:45 )   PT: 13.5 sec;   INR: 1.20 ratio       PTT - ( 2020 13:30 )  PTT:44.1 sec  Urinalysis Basic - ( 2020 14:50 )    Color: Yellow / Appearance: Clear / S.015 / pH: x  Gluc: x / Ketone: Negative  / Bili: Negative / Urobili: Negative   Blood: x / Protein: Negative / Nitrite: Negative   Leuk Esterase: Negative / RBC: x / WBC x   Sq Epi: x / Non Sq Epi: x / Bacteria: x    Procalcitonin, Serum: 0.06 ng/mL (20 @ 16:20)    Serum Pro-Brain Natriuretic Peptide: 4887 pg/mL (20 @ 16:20)    CULTURES: (if applicable)    Rapid RVP Result: NotDetec (20 @ 16:30)      ABG - ( 2020 10:00 )  pH, Arterial: 7.23  pH, Blood: x     /  pCO2: 73    /  pO2: 184   / HCO3: x     / Base Excess: x     /  SaO2: 99        VITALS:  T(C): 36.4 (20 @ 05:00), Max: 36.6 (20 @ 19:00)  T(F): 97.6 (20 @ 05:00), Max: 97.9 (20 @ 19:00)  HR: 61 (20 @ 10:00) (49 - 75)  BP: 95/51 (20 @ 10:00) (75/50 - 127/69)  BP(mean): 66 (20 @ 10:00) (55 - 87)  ABP: --  ABP(mean): --  RR: 6 (20 @ 10:00) (0 - 40)  SpO2: 99% (20 @ 10:17) (90% - 100%)  CVP(mm Hg): --  CVP(cm H2O): --    Ins and Outs     20 @ 07:01  -  20 @ 07:00  --------------------------------------------------------  IN: 120.2 mL / OUT: 320 mL / NET: -199.8 mL    20 @ 07:01  -  20 @ 10:44  --------------------------------------------------------  IN: 55.4 mL / OUT: 50 mL / NET: 5.4 mL        Height (cm): 160 (20 @ 17:19)  Weight (kg): 59 (20 @ 17:19)  BMI (kg/m2): 23 (20 @ 17:19)        I&O's Detail    2020 07:  -  2020 07:00  --------------------------------------------------------  IN:    Oral Fluid: 50 mL    phenylephrine   Infusion: 70.2 mL  Total IN: 120.2 mL    OUT:    Voided: 320 mL  Total OUT: 320 mL    Total NET: -199.8 mL      2020 07:  -  2020 10:44  --------------------------------------------------------  IN:    phenylephrine   Infusion: 55.4 mL  Total IN: 55.4 mL    OUT:    Voided: 50 mL  Total OUT: 50 mL    Total NET: 5.4 mL

## 2020-01-22 NOTE — PROGRESS NOTE ADULT - SUBJECTIVE AND OBJECTIVE BOX
CC:  Patient is a 84y old  Male who presents with a chief complaint of hypothermia, hypotension (2020 10:43)      HPI/BRIEF HOSPITAL COURSE:   85 yo M PMH esophageal cancer s/p esophagectomy (), CAD s/p stents, severe AS with recent cath, HTN, HLD, COPD on home O2 who  presented today  from a local Rehab facility for evaluation of AMS with respiratory distress, hypotension and hypothermia ~ 3 days . No recent fevers, sweats, chills, N/V/D, chest pain, abd pain or SOB at the facility.     He was admitted for AMS, respiratory distress, hypotension and hypothermia with shock of unclear etiology, No leukocytosis, lactate normal no apparent consolidation on chest xray, UA negative for UTI and remaining cultures pending  He was started on pressors with concern for severe sepsis    Events last 24 hours:        PAST MEDICAL & SURGICAL HISTORY:  Inguinal hernia  Anxiety  Abnormal LFTs  Cerebrovascular accident (CVA) due to embolism of cerebral artery  Herpes zoster without complication  Anemia, unspecified type  SOFIA (obstructive sleep apnea)  Hypothyroidism, unspecified type  Pulmonary nodule  Aortic valve stenosis, etiology of cardiac valve disease unspecified  Advanced COPD  Coronary artery disease: s/p 3 stents on 2017 at [place] Dr. Lavelle Reid  OA (osteoarthritis) of knee: right  Former smoker, stopped smoking in distant past  Rheumatoid arthritis  Seizure disorder  Asthma  Hyperlipidemia  BPH (benign prostatic hyperplasia)  HTN (hypertension)  Esophagus cancer  Chronic allergic rhinitis  BCC (basal cell carcinoma): skin  Cerebrovascular accident (CVA)  Anxiety  Anastomotic leak following esophagectomy: Vance logan esophagectomy  History of tonsillectomy  H/O heart artery stent: 2017  Knee arthropathy: left  History of total hip replacement: left  History of hernia repair    Allergies    penicillin (Rash)    Intolerances      FAMILY HISTORY:  Family history of heart attack  Family history of pulmonary embolism: father @ 49 yr old  FH: CAD (coronary artery disease) (Sibling)      Review of Systems:  CONSTITUTIONAL: No fever, chills,+fatigue  EYES: Novisual disturbances  ENMT:  No sinus or throat pain  NECK: No pain   RESPIRATORY: No cough,No shortness of breath  CARDIOVASCULAR: No chest pain, palpitations, dizziness, or leg swelling  GASTROINTESTINAL: No abdominal  pain. No nausea, vomiting, or hematemesis; No diarrhea or constipation. No melena   GENITOURINARY: No dysuria, frequency,   NEUROLOGICAL: No headaches, numbness, or tremors  MUSCULOSKELETAL: No joint pain or swelling; No muscle, back, or extremity pain  PSYCHIATRIC: No difficulty sleeping      Medications:  azithromycin  IVPB 500 milliGRAM(s) IV Intermittent every 24 hours  cefepime   IVPB 2000 milliGRAM(s) IV Intermittent every 12 hours  cefepime   IVPB      phenylephrine    Infusion 0.1 MICROgram(s)/kG/Min IV Continuous <Continuous>  tamsulosin 0.4 milliGRAM(s) Oral at bedtime  albuterol/ipratropium for Nebulization 3 milliLiter(s) Nebulizer every 6 hours  montelukast 10 milliGRAM(s) Oral at bedtime  acetaminophen   Tablet .. 650 milliGRAM(s) Oral every 6 hours PRN  escitalopram 5 milliGRAM(s) Oral daily  phenytoin  IVPB 100 milliGRAM(s) IV Intermittent every 12 hours  clopidogrel Tablet 75 milliGRAM(s) Oral daily  heparin  Injectable 5000 Unit(s) SubCutaneous every 12 hours  pantoprazole    Tablet 40 milliGRAM(s) Oral before breakfast  senna 1 Tablet(s) Oral at bedtime  atorvastatin 20 milliGRAM(s) Oral at bedtime  finasteride 5 milliGRAM(s) Oral daily  hydrocortisone sodium succinate Injectable 50 milliGRAM(s) IV Push every 8 hours  ferrous    sulfate 325 milliGRAM(s) Oral daily  lidocaine 2% Gel 1 Application(s) Topical every 4 hours PRN    ICU Vital Signs Last 24 Hrs  T(C): 37.4 (2020 16:00), Max: 37.4 (2020 16:00)  T(F): 99.3 (2020 16:00), Max: 99.3 (2020 16:00)  HR: 76 (2020 18:30) (57 - 76)  BP: 111/69 (2020 18:30) (76/46 - 127/69)  BP(mean): 82 (2020 18:30) (55 - 87)  ABP: --  ABP(mean): --  RR: 28 (2020 18:30) (0 - 40)  SpO2: 95% (2020 18:30) (67% - 100%)    Vital Signs Last 24 Hrs  T(C): 37.4 (2020 16:00), Max: 37.4 (2020 16:00)  T(F): 99.3 (2020 16:00), Max: 99.3 (2020 16:00)  HR: 76 (2020 18:30) (57 - 76)  BP: 111/69 (2020 18:30) (76/46 - 127/69)  BP(mean): 82 (2020 18:30) (55 - 87)  RR: 28 (2020 18:30) (0 - 40)  SpO2: 95% (2020 18:30) (67% - 100%)    ABG - ( 2020 10:00 )  pH, Arterial: 7.23  pH, Blood: x     /  pCO2: 73    /  pO2: 184   / HCO3: x     / Base Excess: x     /  SaO2: 99                  I&O's Detail    2020 07:01  -  2020 07:00  --------------------------------------------------------  IN:    Oral Fluid: 50 mL    phenylephrine   Infusion: 114.4 mL  Total IN: 164.4 mL    OUT:    Voided: 320 mL  Total OUT: 320 mL    Total NET: -155.6 mL      2020 07:01  -  2020 20:20  --------------------------------------------------------  IN:    phenylephrine   Infusion: 194.9 mL    Solution: 50 mL    Solution: 250 mL    Solution: 100 mL  Total IN: 594.9 mL    OUT:    Indwelling Catheter - Urethral: 690 mL    Voided: 50 mL  Total OUT: 740 mL    Total NET: -145.1 mL            LABS:                        8.9    7.39  )-----------( 127      ( 2020 05:45 )             29.1         142  |  105  |  19  ----------------------------<  25<LL>  4.7   |  29  |  0.81    Ca    8.5      2020 05:45  Mg     2.5         TPro  6.6  /  Alb  2.6<L>  /  TBili  0.2  /  DBili  x   /  AST  34  /  ALT  25  /  AlkPhos  282<H>        CARDIAC MARKERS ( 2020 05:45 )  .020 ng/mL / x     / 103 U/L / x     / x      CARDIAC MARKERS ( 2020 13:30 )  .019 ng/mL / x     / x     / x     / x          CAPILLARY BLOOD GLUCOSE      POCT Blood Glucose.: 95 mg/dL (2020 07:42)    PT/INR - ( 2020 05:45 )   PT: 13.5 sec;   INR: 1.20 ratio         PTT - ( 2020 13:30 )  PTT:44.1 sec  Urinalysis Basic - ( 2020 14:50 )    Color: Yellow / Appearance: Clear / S.015 / pH: x  Gluc: x / Ketone: Negative  / Bili: Negative / Urobili: Negative   Blood: x / Protein: Negative / Nitrite: Negative   Leuk Esterase: Negative / RBC: x / WBC x   Sq Epi: x / Non Sq Epi: x / Bacteria: x      CULTURES:  Culture Results:   No growth to date. ( @ 13:39)  Culture Results:   No growth to date. ( @ 13:35)    azithromycin  IVPB 500 milliGRAM(s) IV Intermittent every 24 hours  cefepime   IVPB 2000 milliGRAM(s) IV Intermittent every 12 hours  cefepime   IVPB          Physical Examination:  General: No acute distress. lethargic oriented, interactive, nonfocal  NEURO: A&O X3, motor function 5/5 BL UE/LE  HEENT: Pupils equal, reactive to light.  Symmetric.  PULM: CTA BL, distant at bases, no significant sputum production, no wheezes, rales, rhonchi  CVS: Regular rate and rhythm, + systolic murmurs,  ABD: Soft, nondistended, nontender, normoactive bowel sounds, no masses  EXT: No edema, nontender  SKIN: Warm and well perfused, no rashes noted      EKG: [facility]    RADIOLOGY: < from: CT Head No Cont (20 @ 14:03) >  Evaluation demonstrates no evidence of mass-effect or midline shift. No intraparenchymal mass lesions or hemorrhage is identified. There is mild left occipital encephalomalacia. There is no evidence of hydrocephalus. No extra-axial collections are noted.    The bone windows demonstrate no gross osseous abnormalities.    Impression:  1. stable chronic findings.    < end of copied text >      CENTRAL LINE: N          DATE INSERTED:                  NIEVES: Y                        DATE INSERTED:                  A-LINE: N                       DATE INSERTED:                  GLOBAL ISSUE/BEST PRACTICE:  Analgesia: N/A  Sedation: N/A  HOB elevation: yes  Stress ulcer prophylaxis: protonix   VTE prophylaxis: Heparin SQ  Glycemic control: FS q6h  Nutrition: NPO CC:  Patient is a 84y old  Male who presents with a chief complaint of hypothermia, hypotension (2020 10:43)      HPI/BRIEF HOSPITAL COURSE:   85 yo M PMH esophageal cancer s/p esophagectomy (2017), CAD s/p stents, severe AS with recent cath, HTN, HLD, COPD on home O2 who  presented today  from a local Rehab facility for evaluation of AMS with respiratory distress, hypotension and hypothermia ~ 3 days . No recent fevers, sweats, chills, N/V/D, chest pain, abd pain or SOB at the facility.     He was admitted for AMS, respiratory distress, hypotension and hypothermia with shock of unclear etiology, No leukocytosis, lactate normal no apparent consolidation on chest xray, UA negative for UTI and remaining cultures pending  He was started on pressors with concern for severe sepsis    Events last 24 hours:    ON pt remained on BiPAP 12/5 and 40%. Pt arousable and talking, lethargic at times. Pt without SOB on BiPAP. Pt still requiring peripheral Ryan ON at 0.7. Pt making adequate UOP with 650 cc for the night and 1.34 L for the day. Pt on cefepime and azithro for potential sepsis PNA. Will continue on stress dose steroids for any underlying adrenal insufficiency causing hypotension Pt is DNR/DNI at this time. Spoke to Pt's family at bedside who would like maintain medical management at this time but if pt has significant decline will plan for comfort measures. Pt temp 97F ON.     PAST MEDICAL & SURGICAL HISTORY:  Inguinal hernia  Anxiety  Abnormal LFTs  Cerebrovascular accident (CVA) due to embolism of cerebral artery  Herpes zoster without complication  Anemia, unspecified type  SOFIA (obstructive sleep apnea)  Hypothyroidism, unspecified type  Pulmonary nodule  Aortic valve stenosis, etiology of cardiac valve disease unspecified  Advanced COPD  Coronary artery disease: s/p 3 stents on 2017 at [place] Dr. Lavelle Reid  OA (osteoarthritis) of knee: right  Former smoker, stopped smoking in distant past  Rheumatoid arthritis  Seizure disorder  Asthma  Hyperlipidemia  BPH (benign prostatic hyperplasia)  HTN (hypertension)  Esophagus cancer  Chronic allergic rhinitis  BCC (basal cell carcinoma): skin  Cerebrovascular accident (CVA)  Anxiety  Anastomotic leak following esophagectomy: Vance logan esophagectomy  History of tonsillectomy  H/O heart artery stent: 2017  Knee arthropathy: left  History of total hip replacement: left  History of hernia repair    Allergies    penicillin (Rash)    Intolerances      FAMILY HISTORY:  Family history of heart attack  Family history of pulmonary embolism: father @ 49 yr old  FH: CAD (coronary artery disease) (Sibling)      Review of Systems:  CONSTITUTIONAL: No fever, chills,+fatigue  EYES: Novisual disturbances  ENMT:  No sinus or throat pain  NECK: No pain   RESPIRATORY: No cough,No shortness of breath  CARDIOVASCULAR: No chest pain, palpitations, dizziness, or leg swelling  GASTROINTESTINAL: No abdominal  pain. No nausea, vomiting, or hematemesis; No diarrhea or constipation. No melena   GENITOURINARY: No dysuria, frequency,   NEUROLOGICAL: No headaches, numbness, or tremors  MUSCULOSKELETAL: No joint pain or swelling; No muscle, back, or extremity pain  PSYCHIATRIC: No difficulty sleeping      Medications:  azithromycin  IVPB 500 milliGRAM(s) IV Intermittent every 24 hours  cefepime   IVPB 2000 milliGRAM(s) IV Intermittent every 12 hours  cefepime   IVPB      phenylephrine    Infusion 0.1 MICROgram(s)/kG/Min IV Continuous <Continuous>  tamsulosin 0.4 milliGRAM(s) Oral at bedtime  albuterol/ipratropium for Nebulization 3 milliLiter(s) Nebulizer every 6 hours  montelukast 10 milliGRAM(s) Oral at bedtime  acetaminophen   Tablet .. 650 milliGRAM(s) Oral every 6 hours PRN  escitalopram 5 milliGRAM(s) Oral daily  phenytoin  IVPB 100 milliGRAM(s) IV Intermittent every 12 hours  clopidogrel Tablet 75 milliGRAM(s) Oral daily  heparin  Injectable 5000 Unit(s) SubCutaneous every 12 hours  pantoprazole    Tablet 40 milliGRAM(s) Oral before breakfast  senna 1 Tablet(s) Oral at bedtime  atorvastatin 20 milliGRAM(s) Oral at bedtime  finasteride 5 milliGRAM(s) Oral daily  hydrocortisone sodium succinate Injectable 50 milliGRAM(s) IV Push every 8 hours  ferrous    sulfate 325 milliGRAM(s) Oral daily  lidocaine 2% Gel 1 Application(s) Topical every 4 hours PRN    ICU Vital Signs Last 24 Hrs  T(C): 37.4 (2020 16:00), Max: 37.4 (2020 16:00)  T(F): 99.3 (2020 16:00), Max: 99.3 (2020 16:00)  HR: 76 (2020 18:30) (57 - 76)  BP: 111/69 (2020 18:30) (76/46 - 127/69)  BP(mean): 82 (2020 18:30) (55 - 87)  ABP: --  ABP(mean): --  RR: 28 (2020 18:30) (0 - 40)  SpO2: 95% (2020 18:30) (67% - 100%)    Vital Signs Last 24 Hrs  T(C): 37.4 (2020 16:00), Max: 37.4 (2020 16:00)  T(F): 99.3 (2020 16:00), Max: 99.3 (2020 16:00)  HR: 76 (2020 18:30) (57 - 76)  BP: 111/69 (2020 18:30) (76/46 - 127/69)  BP(mean): 82 (2020 18:30) (55 - 87)  RR: 28 (2020 18:30) (0 - 40)  SpO2: 95% (2020 18:30) (67% - 100%)    ABG - ( 2020 10:00 )  pH, Arterial: 7.23  pH, Blood: x     /  pCO2: 73    /  pO2: 184   / HCO3: x     / Base Excess: x     /  SaO2: 99                  I&O's Detail    2020 07:01  -  2020 07:00  --------------------------------------------------------  IN:    Oral Fluid: 50 mL    phenylephrine   Infusion: 114.4 mL  Total IN: 164.4 mL    OUT:    Voided: 320 mL  Total OUT: 320 mL    Total NET: -155.6 mL      2020 07:01  -  2020 20:20  --------------------------------------------------------  IN:    phenylephrine   Infusion: 194.9 mL    Solution: 50 mL    Solution: 250 mL    Solution: 100 mL  Total IN: 594.9 mL    OUT:    Indwelling Catheter - Urethral: 690 mL    Voided: 50 mL  Total OUT: 740 mL    Total NET: -145.1 mL            LABS:                        8.9    7.39  )-----------( 127      ( 2020 05:45 )             29.1         142  |  105  |  19  ----------------------------<  25<LL>  4.7   |  29  |  0.81    Ca    8.5      2020 05:45  Mg     2.5         TPro  6.6  /  Alb  2.6<L>  /  TBili  0.2  /  DBili  x   /  AST  34  /  ALT  25  /  AlkPhos  282<H>        CARDIAC MARKERS ( 2020 05:45 )  .020 ng/mL / x     / 103 U/L / x     / x      CARDIAC MARKERS ( 2020 13:30 )  .019 ng/mL / x     / x     / x     / x          CAPILLARY BLOOD GLUCOSE      POCT Blood Glucose.: 95 mg/dL (2020 07:42)    PT/INR - ( 2020 05:45 )   PT: 13.5 sec;   INR: 1.20 ratio         PTT - ( 2020 13:30 )  PTT:44.1 sec  Urinalysis Basic - ( 2020 14:50 )    Color: Yellow / Appearance: Clear / S.015 / pH: x  Gluc: x / Ketone: Negative  / Bili: Negative / Urobili: Negative   Blood: x / Protein: Negative / Nitrite: Negative   Leuk Esterase: Negative / RBC: x / WBC x   Sq Epi: x / Non Sq Epi: x / Bacteria: x      CULTURES:  Culture Results:   No growth to date. ( @ 13:39)  Culture Results:   No growth to date. ( @ 13:35)    azithromycin  IVPB 500 milliGRAM(s) IV Intermittent every 24 hours  cefepime   IVPB 2000 milliGRAM(s) IV Intermittent every 12 hours  cefepime   IVPB          Physical Examination:  General: No acute distress. lethargic oriented, interactive, nonfocal  NEURO: A&O X3, motor function 5/5 BL UE/LE  HEENT: Pupils equal, reactive to light.  Symmetric.  PULM: CTA BL, distant at bases, no significant sputum production, no wheezes, rales, rhonchi  CVS: Regular rate and rhythm, + systolic murmurs,  ABD: Soft, nondistended, nontender, normoactive bowel sounds, no masses  EXT: No edema, nontender  SKIN: Warm and well perfused, no rashes noted      EKG: [facility]    RADIOLOGY: < from: CT Head No Cont (20 @ 14:03) >  Evaluation demonstrates no evidence of mass-effect or midline shift. No intraparenchymal mass lesions or hemorrhage is identified. There is mild left occipital encephalomalacia. There is no evidence of hydrocephalus. No extra-axial collections are noted.    The bone windows demonstrate no gross osseous abnormalities.    Impression:  1. stable chronic findings.    < end of copied text >      CENTRAL LINE: N          DATE INSERTED:                  NIEVES: Y                        DATE INSERTED:                  A-LINE: N                       DATE INSERTED:                  GLOBAL ISSUE/BEST PRACTICE:  Analgesia: N/A  Sedation: N/A  HOB elevation: yes  Stress ulcer prophylaxis: protonix   VTE prophylaxis: Heparin SQ  Glycemic control: FS q6h  Nutrition: NPO

## 2020-01-22 NOTE — PROGRESS NOTE ADULT - ASSESSMENT
ASSESSMENT   85 yo M [facility] esophageal cancer s/p esophagectomy (2017), CAD s/p stents, severe AS with recent cath, HTN, HLD, COPD on home O2 who  presented today  from a local Rehab facility admitted with altered mental status with hypotension and hypothermia and shock of unclear etiology, requiring pressors with concern for severe sepsis.     1. hypotension sepsis vs. adrenal insufficiency-shock   2. SOB   3. AMS/metabolic encephalopathy   4. Hypercarbic respiratory failure   5. Hypothermia       PLAN     NEURO:     PULM:      CV:     GI:      :     ENDO:     ID:     HEME/DVT PPX:     ETHICS        CODE STATUS: DNR/DNI  GOC discussion: Y    Critical Care time: 40 mins assessing presenting problems of acute illness that poses high probability of life threatening deterioration or end organ damage/dysfunction.  Medical decision making including Initiating plan of care, reviewing data, reviewing radiology, direct patient bedside evaluation and interpretation of vital signs, any necessary ventilator management , discussion with multidisciplinary team, discussing goals of care with patient/family, all non inclusive of procedures     Care discussed with bedside provider and eICU attending. If any additional assistance in care/management of patient required by bedside team, provider/RN/family member advised to push "e-alert" button in patient's room, and details will be discussed at that time ASSESSMENT   83 yo M [facility] esophageal cancer s/p esophagectomy (2017), CAD s/p stents, severe AS with recent cath, HTN, HLD, COPD on home O2 who  presented today  from a local Rehab facility admitted with altered mental status with hypotension and hypothermia and shock of unclear etiology, requiring pressors with concern for severe sepsis.     1. hypotension sepsis vs. adrenal insufficiency-shock   2. SOB   3. AMS/metabolic encephalopathy   4. Hypercarbic respiratory failure   5. Hypothermia   6. severe AS    PLAN     - Cont. bipap support ON   - remains on Ryan at 0.7 ON maintain MAP > 65  - hold IVF resuscitation in the setting of severe aortic stenosis  - cefepime and zithro for BS ABX coverage for potential underlying sepsis   - Stress dose steroids, given patient on chronic steroids at home  - F/u culture results   - Hypothermia blanket as needed temp ON 97F  - López in place 650 cc for shift ON   - Change phenytoin to IV as not tolerating oral and family opted for no NGT placement  - Trend cardiac markers   -Spoke to Pt's family at bedside who would like maintain medical management at this time but if pt has significant decline will plan for comfort measures.       CODE STATUS: DNR/DNI  GOC discussion: Y    Critical Care time: 40 mins assessing presenting problems of acute illness that poses high probability of life threatening deterioration or end organ damage/dysfunction.  Medical decision making including Initiating plan of care, reviewing data, reviewing radiology, direct patient bedside evaluation and interpretation of vital signs, any necessary ventilator management , discussion with multidisciplinary team, discussing goals of care with patient/family, all non inclusive of procedures     Care discussed with bedside provider and eICU attending. If any additional assistance in care/management of patient required by bedside team, provider/RN/family member advised to push "e-alert" button in patient's room, and details will be discussed at that time

## 2020-01-23 NOTE — GOALS OF CARE CONVERSATION - ADVANCED CARE PLANNING - CONVERSATION DETAILS
Wife and other family members asking for withdrawal of care. They want to stop all "artificial support" Including vasopressors and antibiotics. Focus on comfort measures only. Briefly discussed hospice and palliative care. Open to discussing with palliative care.
Discussed current condition with the wife. Patient with multiple organ dysfunction, including encephalopathy, hypotension, respiratory failure. Wife expressed that she does not want to "put tubes down his throat" and wants to decrease suffering if he passes. Agrees with DNR/DNI code status. No NGT placement. Ok with IV fluids, antibiotics and vasopressor support as needed.

## 2020-01-23 NOTE — PROGRESS NOTE ADULT - ASSESSMENT
Physical Examination:  GENERAL:               Arousable, No distress  HEENT:                   Pupils equal, reactive to light.  Symmetric. No JVD, Moist MM  PULM:                     Poor air entry, + bibasilar Rales, No Rhonchi, No Wheezing  CVS:                        S1, S2,  + Murmur  ABD:                        Soft, nondistended, nontender, normoactive bowel sounds,   EXT:                        No edema, nontender, No Cyanosis or Clubbing   Vascular:                 Cool to touch Extremities, Normal Distal Pulses  SKIN:                       Poor skin perfusion, no rashes noted.   NEURO:                  Arousable, withdraws to tactile stimuli   PSYC:                      No Insight.    Assessment:  1. Acute on chronic hypercapnic respiratory failure  2. Acute encephalopathy - likely metabolic  3. Septic shock  4. Severe aortic stenosis  5. Grade 2 diastolic heart failure  6. Seizure disorder  7. Hypothyroidism    Plan:  - Breaks with bipa as tolerated  - Titrate down phenylephrine   - Will diurese with lasix 40 mg IVP   - Continue empiric antibiotics for now, though cultures negative thus far  - IV steroids to be continued  - F/u culture results   - Hypothermia blanket   - López placed given poor urine output, monitoring urine output  - Change phenytoin to IV as not tolerating oral and family opted for no NGT placement  - Over all prognosis is poor given multiple organ dysfunction  - Discussed with wife. DNR/DNI code status. Ok with IV antibiotics and IV fluids. Family leaning towards comfort measures if has no improvement in the next 24 hours, as such will hold off on placing central line for now.

## 2020-01-23 NOTE — PROGRESS NOTE ADULT - SUBJECTIVE AND OBJECTIVE BOX
Follow-up Critical Care Progress Note  Chief Complaint : Sepsis    Remains in shock. Minimally arousable. Remains bipap dependent.      Allergies :penicillin (Rash)      PAST MEDICAL & SURGICAL HISTORY:  Inguinal hernia  Anxiety  Abnormal LFTs  Cerebrovascular accident (CVA) due to embolism of cerebral artery  Herpes zoster without complication  Anemia, unspecified type  SOFIA (obstructive sleep apnea)  Hypothyroidism, unspecified type  Pulmonary nodule  Aortic valve stenosis, etiology of cardiac valve disease unspecified  Advanced COPD  Coronary artery disease: s/p 3 stents on 2017 at [place] Dr. Lavelle Reid  OA (osteoarthritis) of knee: right  Former smoker, stopped smoking in distant past  Rheumatoid arthritis  Seizure disorder  Asthma  Hyperlipidemia  BPH (benign prostatic hyperplasia)  HTN (hypertension)  Esophagus cancer  Chronic allergic rhinitis  BCC (basal cell carcinoma): skin  Cerebrovascular accident (CVA)  Anxiety  Anastomotic leak following esophagectomy: [place] logan esophagectomy  History of tonsillectomy  H/O heart artery stent: 2017  Knee arthropathy: left  History of total hip replacement: left  History of hernia repair      Medications:  MEDICATIONS  (STANDING):  albuterol/ipratropium for Nebulization 3 milliLiter(s) Nebulizer every 6 hours  azithromycin  IVPB 500 milliGRAM(s) IV Intermittent every 24 hours  cefepime   IVPB 2000 milliGRAM(s) IV Intermittent every 12 hours  cefepime   IVPB      furosemide   Injectable 40 milliGRAM(s) IV Push daily  heparin  Injectable 5000 Unit(s) SubCutaneous every 12 hours  hydrocortisone sodium succinate Injectable 50 milliGRAM(s) IV Push every 8 hours  levothyroxine Injectable 25 MICROGram(s) IV Push at bedtime  pantoprazole  Injectable 40 milliGRAM(s) IV Push daily  phenylephrine    Infusion 0.1 MICROgram(s)/kG/Min (2.025 mL/Hr) IV Continuous <Continuous>  phenytoin  IVPB 100 milliGRAM(s) IV Intermittent every 12 hours    MEDICATIONS  (PRN):  acetaminophen   Tablet .. 650 milliGRAM(s) Oral every 6 hours PRN Temp greater or equal to 38C (100.4F), Mild Pain (1 - 3)  lidocaine 2% Gel 1 Application(s) Topical every 4 hours PRN Pain      LABS:                        9.5    6.67  )-----------( 135      ( 2020 05:45 )             31.6     -    143  |  105  |  17  ----------------------------<  76  4.4   |  28  |  0.94    Ca    9.0      2020 05:45  Phos  4.1       Mg     1.7         TPro  6.6  /  Alb  2.6<L>  /  TBili  0.2  /  DBili  x   /  AST  34  /  ALT  25  /  AlkPhos  282<H>        CARDIAC MARKERS ( 2020 05:45 )  .020 ng/mL / x     / 103 U/L / x     / x      CARDIAC MARKERS ( 2020 13:30 )  .019 ng/mL / x     / x     / x     / x            PT/INR - ( 2020 05:45 )   PT: 13.5 sec;   INR: 1.20 ratio         PTT - ( 2020 13:30 )  PTT:44.1 sec  Urinalysis Basic - ( 2020 14:50 )    Color: Yellow / Appearance: Clear / S.015 / pH: x  Gluc: x / Ketone: Negative  / Bili: Negative / Urobili: Negative   Blood: x / Protein: Negative / Nitrite: Negative   Leuk Esterase: Negative / RBC: x / WBC x   Sq Epi: x / Non Sq Epi: x / Bacteria: x      Procalcitonin, Serum: 0.06 ng/mL (20 @ 16:20)    Serum Pro-Brain Natriuretic Peptide: 4887 pg/mL (20 @ 16:20)        CULTURES: (if applicable)    Culture - Urine (collected 20 @ 14:50)  Source: .Urine Clean Catch (Midstream)  Final Report (20 @ 21:47):    No growth    Culture - Blood (collected 20 @ 13:39)  Source: .Blood Blood-Peripheral  Preliminary Report (20 @ 20:01):    No growth to date.    Culture - Blood (collected 20 @ 13:35)  Source: .Blood Blood-Peripheral  Preliminary Report (20 @ 20:01):    No growth to date.      Rapid RVP Result: NotDetec (20 @ 16:30)      ABG - ( 2020 10:00 )  pH, Arterial: 7.23  pH, Blood: x     /  pCO2: 73    /  pO2: 184   / HCO3: x     / Base Excess: x     /  SaO2: 99                CAPILLARY BLOOD GLUCOSE      POCT Blood Glucose.: 83 mg/dL (2020 00:24)      RADIOLOGY  CXR:      CT:    ECHO:      VITALS:  T(C): 36.2 (20 09:15), Max: 37.4 (20 @ 16:00)  T(F): 97.2 (20 09:15), Max: 99.3 (20 @ 16:00)  HR: 73 (20 09:33) (61 - 79)  BP: 108/60 (20 09:33) (86/59 - 119/103)  BP(mean): 73 (20 09:33) (55 - 110)  ABP: --  ABP(mean): --  RR: 23 (20 09:33) (6 - 31)  SpO2: 100% (20 09:33) (67% - 100%)  CVP(mm Hg): --  CVP(cm H2O): --    Ins and Outs     20 @ 07:01  -  20 @ 07:00  --------------------------------------------------------  IN: 880.9 mL / OUT: 1390 mL / NET: -509.1 mL    20 @ 07:01  -  20 @ 09:39  --------------------------------------------------------  IN: 150 mL / OUT: 0 mL / NET: 150 mL        Height (cm): 160 (20 17:19)  Weight (kg): 59 (20 17:19)  BMI (kg/m2): 23 (20 17:19)        I&O's Detail    2020 07:01  -  2020 07:00  --------------------------------------------------------  IN:    phenylephrine   Infusion: 380.9 mL    Solution: 250 mL    Solution: 50 mL    Solution: 200 mL  Total IN: 880.9 mL    OUT:    Indwelling Catheter - Urethral: 1340 mL    Voided: 50 mL  Total OUT: 1390 mL    Total NET: -509.1 mL      2020 07:01  -  2020 09:39  --------------------------------------------------------  IN:    Solution: 100 mL    Solution: 50 mL  Total IN: 150 mL    OUT:  Total OUT: 0 mL    Total NET: 150 mL

## 2020-01-23 NOTE — GOALS OF CARE CONVERSATION - ADVANCED CARE PLANNING - TREATMENT GUIDELINES
No artificial nutrition/No antibiotics/Comfort measures only/DNR Order/No IV fluids/No blood draws
No artificial nutrition/DNR Order

## 2020-01-24 NOTE — PATIENT PROFILE ADULT - ARE ANY OF THE ITEMS ON THE CHART
Physical Therapy   Daily Treatment     Patient Name: Richard Hubbard II  Age:  43 y.o., Sex:  male  Medical Record #: [id number]  Today's Date: 10/22/2019     Precautions  Precautions: (P) Weight Bearing As Tolerated Right Lower Extremity, Weight Bearing As Tolerated Left Lower Extremity  Comments: (P) R knee brace/ R knee instability    Subjective    Pt sitting edge of bed, willing to participate.     Objective       10/22/19 1031   Precautions   Precautions Weight Bearing As Tolerated Right Lower Extremity;Weight Bearing As Tolerated Left Lower Extremity   Comments R knee brace/ R knee instability   Neuro-Muscular Treatments   Neuro-Muscular Treatments Co-Contraction;Postural Facilitation;Electrical Stimulation   Comments core strength/ rhytmic stabilization  supine 30 sec x 4 all planes at UE's / LE's. TENS application to low back B - trial pre-set LBP program to intensity 5.5 x 30 minutes and trial modulation setting post-tx to intensity 10    PT Total Time Spent   PT Individual Total Time Spent (Mins) 60   PT Charge Group   PT Gait Training 2   PT Neuromuscular Re-Education / Balance 1   PT Therapeutic Activities 1   PT TENS Application TENS Application       FIM Bed/Chair/Wheelchair Transfers Score: 3 - Moderate Assistance  Bed/Chair/Wheelchair Transfers Description:  Adaptive equipment, Increased time, Set-up of equipment, Assist with two limbs(pt requires lifting assist for BLE's sit>supine/ otherwise SBA/ CGA)    FIM Walking Score:  1 - Total Assistance  Walking Description:  (CGA with FWW 20 ft x 3)    FIM Wheelchair Score:  2 - Max Assistance  Wheelchair Description:  (SPV/ set-up x 75 ft with BUE's)      Assessment    Pt remains limited by pain/ R knee instability and generalized debility/ poor activity tolerance. Trial TENS throughout and post-tx with no adverse effects but questionable benefit    Plan    OOB activity tolerance/ progressive gait training with // bars vs FWW, joint protection  education, LE strength/ core strength training as tolerated     yes

## 2020-01-24 NOTE — H&P ADULT - NSHPPHYSICALEXAM_GEN_ALL_CORE
Vital Signs Last 24 Hrs  T(F): 97.8 (24 Jan 2020 13:21), Max: 97.9 (23 Jan 2020 22:08)  HR: 68 (24 Jan 2020 13:21) (66 - 77)  BP: 100/46 (24 Jan 2020 13:21) (57/34 - 101/48)  RR: 13 (24 Jan 2020 13:21) (10 - 26)  SpO2: 100% (24 Jan 2020 13:21) (100% - 100%)    Physical Exam:  Constitutional: NAD, elderly; laying in bed; somnolent  Neck: Soft and supple, No LAD, No JVD  Respiratory: diminished breath sounds b/l no wheezing no rhonchi  Cardiovascular: +s1/s2 no edema b/l le  Gastrointestinal: soft nt nd bs+  Vascular: 2+ peripheral pulses  Neurological: somnolent; unable to assess full neuro exam  Musculoskeletal: unable to examine given patient clinical status   : Contraindicated  Breasts: Contraindicated  Rectal: Contraindicated

## 2020-01-24 NOTE — DISCHARGE NOTE PROVIDER - NSDCCPCAREPLAN_GEN_ALL_CORE_FT
PRINCIPAL DISCHARGE DIAGNOSIS  Diagnosis: Acute heart failure  Assessment and Plan of Treatment: with severe aortic stenosis      SECONDARY DISCHARGE DIAGNOSES  Diagnosis: Chronic obstructive lung disease  Assessment and Plan of Treatment:     Diagnosis: Hypothyroid  Assessment and Plan of Treatment:     Diagnosis: Seizure disorder  Assessment and Plan of Treatment:     Diagnosis: Septic shock  Assessment and Plan of Treatment:     Diagnosis: Metabolic encephalopathy  Assessment and Plan of Treatment:

## 2020-01-24 NOTE — PATIENT PROFILE ADULT - ABILITY TO HEAR (WITH HEARING AID OR HEARING APPLIANCE IF NORMALLY USED):
Wears B/L hearing aids/Mildly to Moderately Impaired: difficulty hearing in some environments or speaker may need to increase volume or speak distinctly

## 2020-01-24 NOTE — H&P ADULT - NSHPSOCIALHISTORY_GEN_ALL_CORE
Lives at home with family    Former smoker quit 30 eyars prior   Denies illicit drug use  Denies etoh use

## 2020-01-24 NOTE — H&P ADULT - ASSESSMENT
83 yo M with PMH of esophageal cancer s/p esophagectomy (2017), CAD s/p stents, severe AS, HTN, dyslipidemia, anxiety, CHF diastolic, COPD on home O2 presented to [facility] ED 1/21 from Rehab facility for evaluation of AMS, respiratory distress, hypotension and hypothermia.    #multiple medical comorbidity' s as above   - admit to inpatient hospice services- comfort care  - morphine gtt for pain ; add hydromorphone for severe pain prn  - stop lovenox and chemical a/c  - duoneb for wheezing  - ativan standing and prn as patient family bedside stating patient with pmh of anxiety previously on ativan po as well   - dnr dni no codes per family  - would like vitals q12h  - robinol for secretions   - comfort feeds  - no abx no ivf per family    patient medical orders reviewed with patient daughter and granddaughter bedside. all questions answered

## 2020-01-24 NOTE — DISCHARGE NOTE PROVIDER - NSDCFUSCHEDAPPT_GEN_ALL_CORE_FT
MEJIA LATIF ; 02/07/2020 ; NPP [facility] MEJIA LATIF ; 02/07/2020 ; NPP [facility]

## 2020-01-24 NOTE — PROGRESS NOTE ADULT - SUBJECTIVE AND OBJECTIVE BOX
Follow-up Critical Care Progress Note  Chief Complaint : Sepsis    Arousable, not in distress. Comfortable on morphine infusion. family at bedside.     Allergies :penicillin (Rash)      PAST MEDICAL & SURGICAL HISTORY:  Inguinal hernia  Anxiety  Abnormal LFTs  Cerebrovascular accident (CVA) due to embolism of cerebral artery  Herpes zoster without complication  Anemia, unspecified type  SOFIA (obstructive sleep apnea)  Hypothyroidism, unspecified type  Pulmonary nodule  Aortic valve stenosis, etiology of cardiac valve disease unspecified  Advanced COPD  Coronary artery disease: s/p 3 stents on June 30, 2017 at [place] Dr. Lavelle Reid  OA (osteoarthritis) of knee: right  Former smoker, stopped smoking in distant past  Rheumatoid arthritis  Seizure disorder  Asthma  Hyperlipidemia  BPH (benign prostatic hyperplasia)  HTN (hypertension)  Esophagus cancer  Chronic allergic rhinitis  BCC (basal cell carcinoma): skin  Cerebrovascular accident (CVA)  Anxiety  Anastomotic leak following esophagectomy: [place] logan esophagectomy  History of tonsillectomy  H/O heart artery stent: June 30, 2017  Knee arthropathy: left  History of total hip replacement: left  History of hernia repair      Medications:  MEDICATIONS  (STANDING):  morphine  Infusion 0.5 mG/Hr (0.5 mL/Hr) IV Continuous <Continuous>    MEDICATIONS  (PRN):  acetaminophen   Tablet .. 650 milliGRAM(s) Oral every 6 hours PRN Temp greater or equal to 38C (100.4F), Mild Pain (1 - 3)  glycopyrrolate Injectable 0.1 milliGRAM(s) IV Push every 8 hours PRN Oral secretions  lidocaine 2% Gel 1 Application(s) Topical every 4 hours PRN Pain  LORazepam   Injectable 2 milliGRAM(s) IV Push every 2 hours PRN Agitation or seizures  sodium chloride 0.65% Nasal 2 Spray(s) Both Nostrils every 1 hour PRN Dry nose      LABS:                        9.5    6.67  )-----------( 135      ( 23 Jan 2020 05:45 )             31.6     01-23    143  |  105  |  17  ----------------------------<  76  4.4   |  28  |  0.94    Ca    9.0      23 Jan 2020 05:45  Phos  4.1     01-23  Mg     1.7     01-23      Procalcitonin, Serum: 0.06 ng/mL (01-21-20 @ 16:20)    Serum Pro-Brain Natriuretic Peptide: 4887 pg/mL (01-21-20 @ 16:20)        CULTURES: (if applicable)    Culture - Urine (collected 01-21-20 @ 14:50)  Source: .Urine Clean Catch (Midstream)  Final Report (01-22-20 @ 21:47):    No growth    Culture - Blood (collected 01-21-20 @ 13:39)  Source: .Blood Blood-Peripheral  Preliminary Report (01-22-20 @ 20:01):    No growth to date.    Culture - Blood (collected 01-21-20 @ 13:35)  Source: .Blood Blood-Peripheral  Preliminary Report (01-22-20 @ 20:01):    No growth to date.      Rapid RVP Result: NotDetec (01-21-20 @ 16:30)      ABG - ( 22 Jan 2020 10:00 )  pH, Arterial: 7.23  pH, Blood: x     /  pCO2: 73    /  pO2: 184   / HCO3: x     / Base Excess: x     /  SaO2: 99      VITALS:  T(C): 36.3 (01-24-20 @ 00:00), Max: 37 (01-23-20 @ 11:30)  T(F): 97.3 (01-24-20 @ 00:00), Max: 98.6 (01-23-20 @ 11:30)  HR: 68 (01-24-20 @ 07:00) (68 - 91)  BP: 91/46 (01-24-20 @ 07:00) (57/34 - 112/63)  BP(mean): 60 (01-24-20 @ 07:00) (38 - 83)  ABP: --  ABP(mean): --  RR: 12 (01-24-20 @ 07:00) (0 - 28)  SpO2: 100% (01-23-20 @ 20:00) (97% - 100%)  CVP(mm Hg): --  CVP(cm H2O): --    Ins and Outs     01-23-20 @ 07:01  -  01-24-20 @ 07:00  --------------------------------------------------------  IN: 315.5 mL / OUT: 495 mL / NET: -179.5 mL        Height (cm): 160 (01-21-20 @ 17:19)  Weight (kg): 59 (01-21-20 @ 17:19)  BMI (kg/m2): 23 (01-21-20 @ 17:19)        I&O's Detail    23 Jan 2020 07:01  -  24 Jan 2020 07:00  --------------------------------------------------------  IN:    morphine  Infusion: 19 mL    phenylephrine   Infusion: 46.5 mL    Solution: 150 mL    Solution: 100 mL  Total IN: 315.5 mL    OUT:    Indwelling Catheter - Urethral: 495 mL  Total OUT: 495 mL    Total NET: -179.5 mL

## 2020-01-24 NOTE — H&P ADULT - NSICDXPASTSURGICALHX_GEN_ALL_CORE_FT
PAST SURGICAL HISTORY:  Anastomotic leak following esophagectomy [place] logan esophagectomy    H/O heart artery stent June 30, 2017    History of hernia repair     History of tonsillectomy     History of total hip replacement left    Knee arthropathy left

## 2020-01-24 NOTE — DISCHARGE NOTE PROVIDER - HOSPITAL COURSE
85 yo M with PMH of esophageal cancer s/p esophagectomy (2017), CAD s/p stents, severe AS, HTN, dyslipidemia, COPD on home O2 presented to [facility] ED 1/21 from Rehab facility for evaluation of AMS, respiratory distress, hypotension and hypothermia. Patient had been having symptoms for 3 days according to family, with progressive worsening. Family reported prior hospitalization for food bolus requiring upper endoscopy for retrieval was complicated post procedurally by hypercarbia and AMS this was pertinent as family reported prior episode of AMS was secondary to hypercarbia and his current mentation is similar to that episode. There had been  no fevers, sweats, chills, N/V/D, chest pain, abd pain, SOB, rash, hematuria/dysuria.        Initial work-up in ED revealed H&H 8.9/28.8, LA 0.8, WBC 4.2, VBG 7.28/60/65. CXR and CT head, EKG without acute findings and unchanged from prior. In ED patient given 2L IVF bolus, Levaquin and Azithromycin, Val narayanan applied and started on phenylephrine infusion for blood pressure support. Transferred to ICU for further care.        In ICU patient treated for sepsis unknown source.  Over the course of the night patient more lethargic, AM ABG revealed worsening hypercarbic respiratory failure and pressor requirements increased.  Started on Bipap and after a few hours patient more awake and interactive, Bipap weaned to nasal cannula.  In less than 60 minutes patient became dyspneic and requested to go back on Bipap, remained on Bipap overnight.  Pressor requirement minimally changed.        Patient remained Bipap dependent with pressor requirement.  Cultures negative, etiology of episode thought to be more likely acute heart failure in the setting of advanced COPD.  Attempted to diurese patient to wean from Bipap.        At this time family approached primary team. Wife and other family members asking for withdrawal of care. They wanted to stop all "artificial support" Including vasopressors, Bipap and antibiotics, focus on comfort measures only.        Bipap and phenylephrine discontinued, patient started on morphine infusion.  Accepted for inpatient hospice services.        This is only a summary of events during admission, please refer to full medical record for details of hospital stay.

## 2020-01-24 NOTE — CONSULT NOTE ADULT - SUBJECTIVE AND OBJECTIVE BOX
HPI: 83 yo M with PMHx of esophageal cancer s/p esophagectomy (2017), CAD s/p stents, severe AS with recent cath, HTN, HLD, COPD on home O2 presents from Rehab facility for evaluation of AMS, respiratory distress, hypotension and hypothermia. Patient had been having symptoms for 3 days according to family, with progressive worsening. Family reports prior hospitalization for food bolus requiring upper endoscopy for retrieval was complicated post procedurally by hypercarbia and AMS - pertinent as family reports prior episode of AMS was secondary to hypercarbia and his current mentation is similar to that episode.     Initial work-up in ED revealed: H&H 8.9/28.8, LA 0.8, WBC 4.2, VBG 7.28/60/65, CXR and CTH neg, ECG without acute findings - unchanged from prior.       PAST MEDICAL & SURGICAL HISTORY:  Inguinal hernia  Anxiety  Abnormal LFTs  Cerebrovascular accident (CVA) due to embolism of cerebral artery  Herpes zoster without complication  Anemia, unspecified type  SOFIA (obstructive sleep apnea)  Hypothyroidism, unspecified type  Pulmonary nodule  Aortic valve stenosis, etiology of cardiac valve disease unspecified  Advanced COPD  Coronary artery disease: s/p 3 stents on June 30, 2017 at [place] Dr. Lavelle Reid  OA (osteoarthritis) of knee: right  Former smoker, stopped smoking in distant past  Rheumatoid arthritis  Seizure disorder  Asthma  Hyperlipidemia  BPH (benign prostatic hyperplasia)  HTN (hypertension)  Esophagus cancer  Chronic allergic rhinitis  BCC (basal cell carcinoma): skin  Cerebrovascular accident (CVA)  Anxiety  Anastomotic leak following esophagectomy: [place] logan esophagectomy  History of tonsillectomy  H/O heart artery stent: June 30, 2017  Knee arthropathy: left  History of total hip replacement: left  History of hernia repair      SOCIAL HISTORY:    Admitted from:   [facility]   Substance abuse history:              Tobacco hx:                  Alcohol hx:              Home Opioid hx:  Latter-day:                                    Preferred Language:    HCP/Wife:  Marina De Luna            Phone#:  [phone]     cell:  [phone]      FAMILY HISTORY:  Family history of heart attack  Family history of pulmonary embolism: father @ 49 yr old  FH: CAD (coronary artery disease) (Sibling)    Baseline ADLs (prior to admission):    Allergies    penicillin (Rash)    Intolerances      Present Symptoms:   Dyspnea: mild  Nausea/Vomiting: no  Anxiety:  Depressed   Fatigue: yes  Loss of appetite: yes  Pain:      no s/s                          location:          Review of Systems:  Unable to obtain due to poor mentation]    MEDICATIONS  (STANDING):  morphine  Infusion 0.5 mG/Hr (0.5 mL/Hr) IV Continuous <Continuous>    MEDICATIONS  (PRN):  acetaminophen   Tablet .. 650 milliGRAM(s) Oral every 6 hours PRN Temp greater or equal to 38C (100.4F), Mild Pain (1 - 3)  glycopyrrolate Injectable 0.1 milliGRAM(s) IV Push every 8 hours PRN Oral secretions  lidocaine 2% Gel 1 Application(s) Topical every 4 hours PRN Pain  LORazepam   Injectable 2 milliGRAM(s) IV Push every 2 hours PRN Agitation or seizures  sodium chloride 0.65% Nasal 2 Spray(s) Both Nostrils every 1 hour PRN Dry nose      PHYSICAL EXAM:    Vital Signs Last 24 Hrs  T(C): 36.3 (24 Jan 2020 00:00), Max: 37 (23 Jan 2020 11:30)  T(F): 97.3 (24 Jan 2020 00:00), Max: 98.6 (23 Jan 2020 11:30)  HR: 67 (24 Jan 2020 09:00) (66 - 91)  BP: 82/40 (24 Jan 2020 09:00) (57/34 - 104/55)  BP(mean): 51 (24 Jan 2020 09:00) (38 - 72)  RR: 10 (24 Jan 2020 09:00) (0 - 28)  SpO2: 100% (23 Jan 2020 20:00) (100% - 100%)    General: lethargic ,nonverbal    Karnofsky Performance Score/Palliative Performance Status Version2:   10-20  %  HEENT: n/c, a/t , dry mouth    Lungs: breathing comfortably   CV: s1s2  normal   GI: normal  non-distended    : normal / garza  Musculoskeletal:  weakened  w/ trace edema   bedbound  Skin: normal, warm dry , fragile   Neuro: lethargic   Oral intake ability: min/no   Diet: as sg    LABS:                        9.5    6.67  )-----------( 135      ( 23 Jan 2020 05:45 )             31.6     01-23    143  |  105  |  17  ----------------------------<  76  4.4   |  28  |  0.94    Ca    9.0      23 Jan 2020 05:45  Phos  4.1     01-23  Mg     1.7     01-23          RADIOLOGY & ADDITIONAL STUDIES:< from: Xray Chest 1 View- PORTABLE-Routine (01.23.20 @ 06:49) >    EXAM:  XR CHEST PORTABLE ROUTINE 1V      PROCEDURE DATE:  01/23/2020        INTERPRETATION:  Views:1  Comparison: 2 days ago  History: Sepsis, shortness of breath    The lungs are clear of infiltrates and effusions. The cardiac and mediastinal contours appear unremarkable.     Impression:  1. No evidence of acute pulmonary disease.        ADVANCE DIRECTIVES:    ** MOLST   DNR/DNI  w/ comfort measures in place   Advanced Care Planning discussion total time spent:

## 2020-01-24 NOTE — CHART NOTE - NSCHARTNOTEFT_GEN_A_CORE
Nutrition Brief note    Family wishes withdrawal of care, patient currently on morphine drip. No further nutrition intervention warranted.

## 2020-01-24 NOTE — CONSULT NOTE ADULT - ASSESSMENT
A/P : 85 yo M with PMHx of esophageal cancer s/p esophagectomy (2017), CAD s/p stents, severe AS with recent cath, HTN, HLD, COPD on home O2 presents w/ AMS, and resp distress.   Currently in ccu    Sepsis - hypothermia and hypotension    Severe AS    Compensated Metabolic Acidosis   End stage COPD on home o2   Palliative : Asked for support , end of life care, hospice referral. Pt currently looks  comfortable, now on low dose MS gtt at 0.5 mg /hr.  Family had discussion w/ ccu team, for withdrawal of care and to institute comfort care measures.   I met with wife and family at bedside today, they reviewed that their main goal is for pt to be made comfortable. We discussed hospice services, family agreeable.  Reviewed w/ ccu team and CM,  hospice referral sent, doc to doc to be done today, for inpatient hospice services here at .  Will cont to follow for support, goc .   Referral made to  for spiritual support.

## 2020-01-24 NOTE — PROGRESS NOTE ADULT - ASSESSMENT
Physical Examination:  GENERAL:               Arousable, No distress  HEENT:                   Pupils equal, reactive to light.  Symmetric. No JVD, Moist MM  PULM:                     Poor air entry, at bases, No Rhonchi, No Wheezing  CVS:                        S1, S2,  + Murmur  ABD:                        Soft, nondistended, nontender, normoactive bowel sounds,   EXT:                        No edema, nontender, No Cyanosis or Clubbing   Vascular:                 Warm to touch Extremities, Normal Distal Pulses  SKIN:                       Poor skin perfusion, no rashes noted.   NEURO:                  Arousable, withdraws to tactile stimuli   PSYC:                      No Insight.    Assessment:  1. Acute on chronic hypercapnic respiratory failure  2. Acute encephalopathy - likely metabolic  3. Septic shock  4. Severe aortic stenosis  5. Grade 2 diastolic heart failure  6. Seizure disorder  7. Hypothyroidism    Plan:  - Comfort measures only  - DNR/DNI code status  - Morphine infusion  - Will refer to hospice  - Transfer out of ICU today

## 2020-01-24 NOTE — H&P ADULT - NSICDXFAMILYHX_GEN_ALL_CORE_FT
FAMILY HISTORY:  Family history of anxiety disorder  Family history of heart attack  Family history of pulmonary embolism, father @ 49 yr old    Sibling  Still living? No  FH: CAD (coronary artery disease), Age at diagnosis: Age Unknown

## 2020-01-24 NOTE — DISCHARGE NOTE PROVIDER - NSDCMRMEDTOKEN_GEN_ALL_CORE_FT
acetaminophen 325 mg oral tablet: 2 tab(s) orally every 6 hours, As needed, Mild Pain (1 - 3), Moderate Pain (4 - 6)  ALPRAZolam 0.25 mg oral tablet: 1 tab(s) orally 3 times a day, As Needed  escitalopram 5 mg oral tablet: 1 tab(s) orally once a day  ezetimibe 10 mg oral tablet: 1 tab(s) orally once a day  ferrous sulfate 325 mg (65 mg elemental iron) oral tablet: 1 tab(s) orally once a day  finasteride 5 mg oral tablet: 1 tab(s) orally once a day  ipratropium-albuterol 0.5 mg-2.5 mg/3 mLinhalation solution: 3 milliliter(s) inhaled every 6 hours, As needed, Shortness of Breath and/or Wheezing  montelukast 10 mg oral tablet: 1 tab(s) orally once a day  pantoprazole 40 mg oral delayed release tablet: 1 tab(s) orally once a day (before a meal)  phenytoin 100 mg oral capsule, extended release: 1 cap(s) orally 2 times a day (MORNING, EVENING)  phenytoin 50 mg oral tablet, chewable: 50 milligram(s) orally once a day  Plavix 75 mg oral tablet: 1 tab(s) orally once a day  predniSONE 5 mg oral tablet: 1 tab(s) orally once a day  senna oral tablet: 1 tab(s) orally once a day (at bedtime)  simvastatin 40 mg oral tablet: 1 tab(s) orally once a day (at bedtime)  sodium chloride 0.65% nasal spray:  nasal   Synthroid 50 mcg (0.05 mg) oral tablet: 1 tab(s) orally once a day  tamsulosin 0.4 mg oral capsule: 1 cap(s) orally once a day ipratropium-albuterol 0.5 mg-2.5 mg/3 mLinhalation solution: 3 milliliter(s) inhaled every 6 hours, As needed, Shortness of Breath and/or Wheezing  lidocaine 2% topical gel with applicator: 1 application topically every 4 hours, As needed, Pain  LORazepam: 1 milligram(s) intravenous every 4 hours, As Needed  Robinul 0.2 mg/mL injectable solution: 0.1 milligram(s) injectable every 8 hours, As Needed  sodium chloride 0.65% nasal spray: 2 spray(s) nasal every hour

## 2020-01-24 NOTE — H&P ADULT - HISTORY OF PRESENT ILLNESS
83 yo M with PMH of esophageal cancer s/p esophagectomy (2017), CAD s/p stents, severe AS, HTN, dyslipidemia, CHF diastolic, anxiety COPD on home O2 presented to [facility] ED 1/21 from Rehab facility for evaluation of AMS, respiratory distress, hypotension and hypothermia. Patient had been having symptoms for 3 days prior to arrival according to family, with progressive worsening. Family reported prior hospitalization for food bolus requiring upper endoscopy for retrieval was complicated post procedurally by hypercarbia and AMS this was pertinent as family reported prior episode of AMS was secondary to hypercarbia. Initial work-up in ED revealed H&H 8.9/28.8, LA 0.8, WBC 4.2, VBG 7.28/60/65. CXR and CT head, EKG without acute findings and unchanged from prior. In ED patient given 2L IVF bolus, Levaquin and Azithromycin, Val narayanan applied and started on phenylephrine infusion for blood pressure support. Transferred to ICU for further care. In ICU patient treated for sepsis unknown source.  Over the course of the night patient more lethargic, AM ABG revealed worsening hypercarbic respiratory failure and pressor requirements increased.  Started on Bipap and after a few hours patient more awake and interactive, Bipap weaned to nasal cannula.  In less than 60 minutes patient became dyspneic and requested to go back on Bipap, remained on Bipap overnight.  Pressor requirement minimally changed. Patient remained Bipap dependent with pressor requirement.  Cultures negative, etiology of episode thought to be more likely acute heart failure in the setting of advanced COPD.  Attempted to diurese patient to wean from Bipap.     However at this time as patient now bipap dependent was family approached ICU team about changing patients goals of cares to that of comfort focus opting to stop further blood draws, dvt prophylaxis, vasopressors, bipap and antibiotics. they would like to have vitals signs checked every 12 hours. Patient accepted to inpatient hospice.     The above history was found in patient discharge record and comfirmed by patient family bedside wife, 2 daughters and grandaughters.

## 2020-01-24 NOTE — H&P ADULT - NSICDXPASTMEDICALHX_GEN_ALL_CORE_FT
PAST MEDICAL HISTORY:  Abnormal LFTs     Advanced COPD     Anemia, unspecified type     Anxiety     Anxiety     Aortic valve stenosis, etiology of cardiac valve disease unspecified     Asthma     BCC (basal cell carcinoma) skin    BPH (benign prostatic hyperplasia)     Cerebrovascular accident (CVA)     Cerebrovascular accident (CVA) due to embolism of cerebral artery     Chronic allergic rhinitis     Coronary artery disease s/p 3 stents on June 30, 2017 at [place] Dr. Lavelle Reid    Esophagus cancer     Former smoker, stopped smoking in distant past     Herpes zoster without complication     HTN (hypertension)     Hyperlipidemia     Hypothyroidism, unspecified type     Inguinal hernia     OA (osteoarthritis) of knee right    SOFIA (obstructive sleep apnea)     Pulmonary nodule     Rheumatoid arthritis     Seizure disorder

## 2020-01-25 NOTE — PROGRESS NOTE ADULT - ASSESSMENT
83 yo M with PMH of esophageal cancer s/p esophagectomy (2017), CAD s/p stents, severe AS, HTN, dyslipidemia, anxiety, CHF diastolic, COPD on home O2 presented to [facility] ED 1/21 from Rehab facility for evaluation of AMS, respiratory distress, hypotension and hypothermia, now admitted to inpatient hospice.

## 2020-01-25 NOTE — DISCHARGE NOTE FOR THE EXPIRED PATIENT - HOSPITAL COURSE
85 yo M with PMH of esophageal cancer s/p esophagectomy (2017), CAD s/p stents, severe AS, HTN, dyslipidemia, CHF diastolic, anxiety COPD on home O2 presented to [facility] ED 1/21 from Rehab facility for evaluation of AMS, respiratory distress, hypotension and hypothermia. Patient had been having symptoms for 3 days prior to arrival according to family, with progressive worsening. Family reported prior hospitalization for food bolus requiring upper endoscopy for retrieval was complicated post procedurally by hypercarbia and AMS this was pertinent as family reported prior episode of AMS was secondary to hypercarbia. Initial work-up in ED revealed H&H 8.9/28.8, LA 0.8, WBC 4.2, VBG 7.28/60/65. CXR and CT head, EKG without acute findings and unchanged from prior. In ED patient given 2L IVF bolus, Levaquin and Azithromycin, Val narayanan applied and started on phenylephrine infusion for blood pressure support. Transferred to ICU for further care. In ICU patient treated for sepsis unknown source.  Over the course of the night patient more lethargic, AM ABG revealed worsening hypercarbic respiratory failure and pressor requirements increased.  Started on Bipap and after a few hours patient more awake and interactive, Bipap weaned to nasal cannula.  In less than 60 minutes patient became dyspneic and requested to go back on Bipap, remained on Bipap overnight.  Pressor requirement minimally changed. Patient remained Bipap dependent with pressor requirement.  Cultures negative, etiology of episode thought to be more likely acute heart failure in the setting of advanced COPD.  Attempted to diurese patient to wean from Bipap.     However at this time as patient now bipap dependent was family approached ICU team about changing patients goals of cares to that of comfort focus opting to stop further blood draws, dvt prophylaxis, vasopressors, bipap and antibiotics. they would like to have vitals signs checked every 12 hours. Patient accepted to inpatient hospice.     Pronounced on 1/25/2020 at 12:10pm

## 2020-01-25 NOTE — PROGRESS NOTE ADULT - SUBJECTIVE AND OBJECTIVE BOX
Patient is a 84y old  Male who presents with a chief complaint of hypothermia and hypotension (24 Jan 2020 17:29)  No acute events overnight        Patient seen and examined at bedside.    ALLERGIES:  penicillin (Rash)        Vital Signs Last 24 Hrs  T(F): 96.9 (25 Jan 2020 05:21), Max: 97.8 (24 Jan 2020 13:21)  HR: 74 (25 Jan 2020 05:21) (66 - 78)  BP: 68/40 (25 Jan 2020 05:21) (68/40 - 101/48)  RR: 17 (25 Jan 2020 05:21) (11 - 17)  SpO2: 82% (25 Jan 2020 05:21) (82% - 100%)  I&O's Summary    24 Jan 2020 07:01  -  25 Jan 2020 07:00  --------------------------------------------------------  IN: 5 mL / OUT: 100 mL / NET: -95 mL      MEDICATIONS:  albuterol/ipratropium for Nebulization 3 milliLiter(s) Nebulizer every 6 hours  artificial  tears Solution 2 Drop(s) Both EYES every 1 hour PRN  diphenhydrAMINE   Injectable 25 milliGRAM(s) IV Push every 8 hours PRN  glycopyrrolate Injectable 0.1 milliGRAM(s) IV Push every 8 hours PRN  HYDROmorphone  Injectable 0.5 milliGRAM(s) IV Push every 8 hours PRN  LORazepam   Injectable 1 milliGRAM(s) IV Push every 12 hours  LORazepam   Injectable 1 milliGRAM(s) IV Push every 4 hours PRN  morphine  Infusion 5 mG/Hr IV Continuous <Continuous>  senna 2 Tablet(s) Oral two times a day      PHYSICAL EXAM:  General: NAD  ENT: MMM  Neck: Supple, No JVD  Lungs: Non-labored breathing, bilateral air entry  Cardio: S1S2 regular  Abdomen: Soft, Nontender  Extremities: No cyanosis, No edema    LABS:                        9.5    6.67  )-----------( 135      ( 23 Jan 2020 05:45 )             31.6     01-23    143  |  105  |  17  ----------------------------<  76  4.4   |  28  |  0.94    Ca    9.0      23 Jan 2020 05:45  Phos  4.1     01-23  Mg     1.7     01-23      eGFR if Non African American: 74 mL/min/1.73M2 (01-23-20 @ 05:45)  eGFR if African American: 86 mL/min/1.73M2 (01-23-20 @ 05:45)                  ABG - ( 22 Jan 2020 10:00 )  pH, Arterial: 7.23  pH, Blood: x     /  pCO2: 73    /  pO2: 184   / HCO3: x     / Base Excess: x     /  SaO2: 99                        01-23 SfcwfmumvyA3P 4.7  12-16 ZessiqehhyZ6H 5.0        Culture - Urine (collected 21 Jan 2020 14:50)  Source: .Urine Clean Catch (Midstream)  Final Report (22 Jan 2020 21:47):    No growth    Culture - Blood (collected 21 Jan 2020 13:39)  Source: .Blood Blood-Peripheral  Preliminary Report (22 Jan 2020 20:01):    No growth to date.    Culture - Blood (collected 21 Jan 2020 13:35)  Source: .Blood Blood-Peripheral  Preliminary Report (22 Jan 2020 20:01):    No growth to date.        RADIOLOGY & ADDITIONAL TESTS:    Care Discussed with Consultants/Other Providers:

## 2020-01-25 NOTE — PROGRESS NOTE ADULT - PROBLEM SELECTOR PLAN 1
pt now admitted to inpatient hospice, comfort care DNR/DNI, no abx no IVF as per family, continue morphine gtt for pain, continue hydromorphone prn for severe pain, ativan standing and prn for anxiety, stop lovenox and chemical ac, duoneb for wheezing, robinol for secretions, vitals q 12 hours

## 2020-01-25 NOTE — PROGRESS NOTE ADULT - ATTENDING COMMENTS
I have personally seen and examined patient on the above date.  I discussed the case with Kirby Duvall NP and I agree with findings and plan as detailed per note above, which I have amended where appropriate.    Appears comfortable  maintain morphine gtt, titrate as needed  family at bedside   supportive care

## 2020-01-25 NOTE — CHART NOTE - NSCHARTNOTEFT_GEN_A_CORE
MEDICINE NOTE    Called to bedside to evaluate the patient for no pulse.     On physical exam, patient did not respond to verbal or noxious stimuli.  No spontaneous respirations.  Absent heart and breath sounds.  Absent radial and carotid pulses.   Pupils are fixed and dilated, no corneal reflex.  Patient pronounced dead at 12:10 PM.  Attending notified.  Family declined autopsy.

## 2020-01-26 LAB
CORTICOSTEROID BINDING GLOBULIN RESULT: 1.6 MG/DL — LOW
CORTICOSTEROID BINDING GLOBULIN RESULT: 2.2 MG/DL — SIGNIFICANT CHANGE UP
CORTIS F/TOTAL MFR SERPL: 12 % — SIGNIFICANT CHANGE UP
CORTIS F/TOTAL MFR SERPL: 4.5 % — SIGNIFICANT CHANGE UP
CORTIS SERPL-MCNC: 7.9 UG/DL — SIGNIFICANT CHANGE UP
CORTIS SERPL-MCNC: 9.3 UG/DL — SIGNIFICANT CHANGE UP
CORTISOL, FREE RESULT: 0.36 UG/DL — SIGNIFICANT CHANGE UP
CORTISOL, FREE RESULT: 1.1 UG/DL — SIGNIFICANT CHANGE UP
CULTURE RESULTS: SIGNIFICANT CHANGE UP
CULTURE RESULTS: SIGNIFICANT CHANGE UP
SPECIMEN SOURCE: SIGNIFICANT CHANGE UP
SPECIMEN SOURCE: SIGNIFICANT CHANGE UP

## 2020-02-07 ENCOUNTER — APPOINTMENT (OUTPATIENT)
Dept: PULMONOLOGY | Facility: CLINIC | Age: 85
End: 2020-02-07

## 2020-02-14 NOTE — PROGRESS NOTE BEHAVIORAL HEALTH - ABNORMAL MOVEMENTS
Spoke to neurologist office, they faxed the orders. Referral approved, faxed.   No abnormal movements

## 2020-02-18 NOTE — DISCHARGE NOTE ADULT - FLU SEASON?
Patient calls to inquire if fax from [employer] regarding short term disability had been received.  Patient requests a call back at [phone]    Yes...

## 2020-02-24 NOTE — ED PROVIDER NOTE - CRITICAL CARE PROVIDED
interpretation of diagnostic studies/documentation/additional history taking/consultation with other physicians/consult w/ pt's family directly relating to pts condition/direct patient care (not related to procedure)/conducted a detailed discussion of DNR status [Change in Activity] : no change in activity [Fever Above 102] : no fever [Malaise] : no malaise [Rash] : no rash [Itching] : no itching [Eczema] : no eczema [Large Birth Marks] : no large birth marks [Redness] : no redness [Nasal Stuffiness] : no nasal congestion [Change in Vision] : no change in vision  [Sore Throat] : no sore throat [Earache] : no earache [Nosebleeds] : no epistaxis [Tachypnea] : no tachypnea [Wheezing] : no wheezing [Cough] : no cough [Shortness of Breath] : no shortness of breath [Asthma] : no asthma [Congestion] : no congestion [Limping] : no limping [Joint Pains] : no arthralgias [Joint Swelling] : no joint swelling [Appropriate Age Development] : development appropriate for age

## 2020-02-28 NOTE — H&P CARDIOLOGY - PSH
[id number] Anastomotic leak following esophagectomy  Vanec logan esophagectomy  H/O heart artery stent  June 30, 2017  History of hernia repair    History of tonsillectomy    History of total hip replacement  left  Knee arthropathy  left

## 2020-06-18 NOTE — DIETITIAN INITIAL EVALUATION ADULT. - PERTINENT LABORATORY DATA
oriented to person, place and time ; short and long term memory intact 8/18/17 H/H 9.3/28.9 LOW , Glu 111 high

## 2020-06-20 NOTE — DISCHARGE NOTE ADULT - NS AS DC STROKE DX YN
Pt AAOx4, VSS, afebrile. IV antibiotics administered per order. LE elevated in boots and pain controlled with lidocaine patches. PICC line dressing C/D/I. Patient on 1L NC overnight due to satting 88-89% on RA, SpO2 93-94%. No complaints overnight. Frequent rounding maintained. WCTM.    no

## 2020-06-23 NOTE — ED PROVIDER NOTE - CROS ED ROS STATEMENT
EXAMINATION:  VITAL SIGNS:  Temperature 98.3, respiratory rate 22, pulse 108, blood  pressure 118/60, saturating 98%. CNS:  Alert, awake and oriented. PSYCH:  The patient is cooperative. EYES:  Pupils are reactive to light. ENT:  No oral mucosal lesions. RESPIRATORY SYSTEM:  Some expiratory wheezes noted with decreased  overall air entry. CVS:  S1, S2 are heard. No murmurs or rubs. Tachycardic rhythm at  around 130. ABDOMEN:  Soft. No guarding, rigidity, or rebound. MUSCULOSKELETAL:  No acute deformities. SKIN:  Without rashes or lesions. DIAGNOSTIC DATA:  EKG shows sinus tachycardia. White count of 11.6,  hemoglobin 10.4, neutrophils 9.6, hematocrit 33.1, MCV of 74.5. Lactic  acid 1.7. BNP 1310. Comprehensive metabolic panel showed potassium  2.7, BUN 11, creatinine 1.0, calcium 13.3. Chest x-ray shows right  hemithorax _____ opacity. Ionized calcium is 1.56 to 1.54. REVIEW OF PREVIOUS MEDICAL RECORDS:  Shows CT chest with IV contrast  from 04/13/2020 from the [facility] that shows presence of medial  right upper lobe mass in the lung and worsening of metastasis in the  right lower lobe and pleural based nodular and mass like densities in  the right lung. CONSULTATIONS:  Nephrology. ASSESSMENT:  1.  Malignancy induced hypercalcemia. 2.  Hypokalemia. 3.  Metastatic lung cancer. PLAN OF CARE:  The patient is admitted to Internal Medicine Service. IV  hydration will be continued. The patient received subcutaneous  calcitonin and one dose of IV pamidronate. Nephrology consult has been  requested. The patient's basic metabolic profile will be repeated in  the a.m. to assess for adequacy of decrease in the calcium and the  adequacy of replacement of the potassium. DVT prophylaxis with Lovenox. CODE STATUS:  Full. EXPECTED LENGTH OF STAY:  More than two midnights based on plan of care  above.     RISK:  High due to the patient's presentation with the malignancy  induced hypercalcemia. DISPOSITION:  Admitted to Internal Medicine Service.         Raul Gilliam MD    D: 06/23/2020 3:42:43       T: 06/23/2020 5:39:47     [email or web address]  Job#: [id number]     Doc#: [id number]    CC: all other ROS negative except as per HPI

## 2020-08-24 NOTE — PHYSICAL THERAPY INITIAL EVALUATION ADULT - DISCHARGE DISPOSITION, PT EVAL
She returns today her proximal strength having improved right EHL at 4/5 but still extremely weak or absent left EHL.  Overall she is in good spirits her back pains better, she is off narcotics, she is through with radiation therapy and tolerating chemotherapy.  She looks good.  The x-rays of the tibia suggest this bone is healed in satisfactory position x-rays of the spine show good position of the implants no significant loosening no change in posture.  I can't comment on the fusion but technically everything is staying in good position.  Doing well overall I'm going to take her out of the fracture brace on the left and she can transfer weightbearing as tolerated, she'll continue the spinal brace, and see me back in a month with repeat thoracic spine x-rays.   Detail Level: Generalized Detail Level: Simple home w/ home PT

## 2020-08-28 NOTE — BEHAVIORAL HEALTH ASSESSMENT NOTE - NS ED [facility] MED ROS EYES
Is This A New Presentation, Or A Follow-Up?: Skin Lesion
What Type Of Note Output Would You Prefer (Optional)?: Standard Output
How Severe Is Your Skin Lesion?: mild
Has Your Skin Lesion Been Treated?: not been treated
Which Family Member (Optional)?: Grandfather
No complaints

## 2020-10-02 NOTE — PRE-OP CHECKLIST - HEIGHT IN INCHES
Subjective   Violet Rahman is a 60 y.o. female.     Chief Complaint   Patient presents with   • Hospital Follow Up Visit     Blood Sugar        History of Present Illness     Patient is here today for hospital follow up from ER visit on 10/1.  She went in for elevated blood sugar, but her reading was 111.  She states when she read her sugars it was 525 when she checked it with hers at home is what made her go.       She also reported insomnia, but we are getting her into the sleep speciality clinic.     She also reported hearing grasshoppers in her ears.         DIABETES: a1c is elevated to 7.3 from 5.9. Patient had asked to switch to trulicity from metformin.        INSOMNIA: she states with the current medication she sleeps 3-4 hours and has a lot of nightmares and then feels like she wants to sleep all day  We also tried trazodone since the ambien.      She has appt with sleep specialty clinic on October 15    CONSTIPATION: she is also reporting that the linzess doesn't work anymore.  She hasn't ever tried any other prescriptions besides the linzess for constipation.          The following portions of the patient's history were reviewed and updated as appropriate: allergies, current medications, past family history, past medical history, past social history, past surgical history and problem list.    Past Medical History:   Diagnosis Date   • Anxiety    • Depression    • Diabetes (CMS/HCC)    • Hypertension    • Hypothyroidism    • OA (osteoarthritis)    • OAB (overactive bladder)    • AMELIA (obstructive sleep apnea)    • Stomach ulcer        Past Surgical History:   Procedure Laterality Date   •  SECTION      X2   • COLONOSCOPY N/A 2016    Procedure: COLONOSCOPY;  Surgeon: Raiza Talbot MD;  Location: [facility] OR;  Service:    • ENDOSCOPY N/A 2016    Procedure: ESOPHAGOGASTRODUODENOSCOPY with biopsies  and link test;  Surgeon: Raiza Talbot MD;  Location: [facility] OR;  Service:    • KNEE  "SURGERY Right    • LAPAROSCOPIC CHOLECYSTECTOMY         Family History   Problem Relation Age of Onset   • Hypertension Mother    • Stroke Mother    • Diabetes Mother    • Lung cancer Father    • Diabetes Sister    • Colon cancer Sister        Social History     Socioeconomic History   • Marital status: Single     Spouse name: Not on file   • Number of children: Not on file   • Years of education: Not on file   • Highest education level: Not on file   Tobacco Use   • Smoking status: Never Smoker   • Smokeless tobacco: Never Used   Substance and Sexual Activity   • Alcohol use: No   • Drug use: No   • Sexual activity: Defer         Current Outpatient Medications:   •  atenolol (TENORMIN) 25 MG tablet, Take 1 tablet by mouth once daily, Disp: 90 tablet, Rfl: 0  •  BREO ELLIPTA 100-25 MCG/INH inhaler, Inhale 1 puff Daily. Rinse mouth after each use, Disp: , Rfl: 11  •  DULoxetine (CYMBALTA) 20 MG capsule, Take 1 capsule by mouth once daily, Disp: 30 capsule, Rfl: 2  •  EPINEPHrine (EPIPEN) 0.3 MG/0.3ML solution auto-injector injection, AS DIRECTED. USE IN CASE OF MEDICAL EMERGENCY, Disp: , Rfl:   •  fluticasone (FLONASE) 50 MCG/ACT nasal spray, 2 sprays into the nostril(s) as directed by provider Daily., Disp: 16 g, Rfl: 11  •  glucose blood test strip, , Disp: , Rfl:   •  HYDROcodone-acetaminophen (NORCO)  MG per tablet, Take 1 tablet by mouth 3 (Three) Times a Day As Needed. for pain, Disp: , Rfl:   •  Insulin Pen Needle (PEN NEEDLES 5/16\") 30G X 8 MM misc, 1 Units As Needed (with trulicity)., Disp: 100 each, Rfl: 0  •  levothyroxine (SYNTHROID, LEVOTHROID) 100 MCG tablet, Take 1 tablet by mouth Daily., Disp: 30 tablet, Rfl: 2  •  Mirabegron ER (MYRBETRIQ) 50 MG tablet sustained-release 24 hour 24 hr tablet, Take 50 mg by mouth Daily., Disp: 14 tablet, Rfl: 0  •  ondansetron ODT (ZOFRAN-ODT) 4 MG disintegrating tablet, DISSOLVE 1 TABLET IN MOUTH EVERY 8 HOURS AS NEEDED FOR NAUSEA AND VOMITING, Disp: 12 tablet, " "Rfl: 0  •  pantoprazole (PROTONIX) 40 MG EC tablet, Take 1 tablet by mouth Daily., Disp: 30 tablet, Rfl: 2  •  PROAIR  (90 Base) MCG/ACT inhaler, Inhale See Admin Instructions. Inhale 2 puffs by mouth every 4 to 6 hours as needed, Disp: , Rfl: 3  •  rosuvastatin (CRESTOR) 10 MG tablet, Take 1 tablet by mouth Daily., Disp: 30 tablet, Rfl: 2  •  amitriptyline (ELAVIL) 50 MG tablet, Take 1-2 tablets by mouth At Night As Needed for Sleep., Disp: 60 tablet, Rfl: 2  •  hydrOXYzine (ATARAX) 50 MG tablet, Take 1-2 tablets by mouth At Night As Needed (sleep)., Disp: 60 tablet, Rfl: 2  •  lubiprostone (Amitiza) 24 MCG capsule, Take 1 capsule by mouth Daily With Breakfast., Disp: 30 capsule, Rfl: 2  •  metFORMIN (GLUCOPHAGE) 500 MG tablet, Take 2 tablets by mouth 2 (Two) Times a Day With Meals., Disp: 120 tablet, Rfl: 2    Review of Systems   Constitutional: Negative for fatigue and fever.   Respiratory: Negative for cough, shortness of breath and wheezing.    Cardiovascular: Negative for chest pain.   Gastrointestinal: Positive for constipation. Negative for abdominal pain, diarrhea, nausea and vomiting.   Genitourinary: Negative for dysuria and urgency.   Skin: Negative.    Neurological: Negative for dizziness and headache.   Psychiatric/Behavioral: Positive for sleep disturbance. Negative for suicidal ideas and depressed mood. The patient is not nervous/anxious.        Objective   Vitals:    10/02/20 1018   BP: 122/68   Pulse: 79   Temp: 96.6 °F (35.9 °C)   SpO2: 98%   Weight: 75.1 kg (165 lb 9.6 oz)   Height: 162.6 cm (64.02\")     Body mass index is 28.41 kg/m².  Physical Exam  Constitutional:       Appearance: Normal appearance.   Cardiovascular:      Rate and Rhythm: Normal rate and regular rhythm.      Pulses: Normal pulses.   Pulmonary:      Effort: Pulmonary effort is normal.      Breath sounds: Normal breath sounds.   Neurological:      Mental Status: She is alert and oriented to person, place, and time. "   Psychiatric:         Mood and Affect: Mood normal.         Behavior: Behavior normal.         Thought Content: Thought content normal.         Judgment: Judgment normal.           Assessment/Plan   Violet was seen today for hospital follow up visit.    Diagnoses and all orders for this visit:    Type 2 diabetes mellitus without complication, without long-term current use of insulin (CMS/[facility])  -     POC Glycosylated Hemoglobin (Hb A1C)    High risk medication use  -     Drug Profile [id number] - Urine, Clean Catch    Drug-induced constipation    Other insomnia    Other orders  -     metFORMIN (GLUCOPHAGE) 500 MG tablet; Take 2 tablets by mouth 2 (Two) Times a Day With Meals.  -     amitriptyline (ELAVIL) 50 MG tablet; Take 1-2 tablets by mouth At Night As Needed for Sleep.  -     lubiprostone (Amitiza) 24 MCG capsule; Take 1 capsule by mouth Daily With Breakfast.    Diabetes-advised patient I think we should stop Trulicity and go back to metformin.  We also discussed avoiding excess carbs in diet.  Patient is agreeable to this.  I would like her to take her glucometer to her pharmacy and have them recalibrate it.    Insomnia-we will trial amitriptyline to see how this helps with her insomnia.  Also advised her to make sure she keeps her appointment with the sleep specialist.  We discussed once again that I will not be continuing the Ambien as it is not recommended with the hydrocodone.    Constipation*we will trial amitiza in place of the Linzess.  I also discussed the importance of a high-fiber diet and increasing her water intake.  Patient verbalizes understanding.    We will follow-up on this in 3 months, sooner if any issues.         Patient Instructions   Diabetes Mellitus and Nutrition, Adult  When you have diabetes (diabetes mellitus), it is very important to have healthy eating habits because your blood sugar (glucose) levels are greatly affected by what you eat and drink. Eating healthy foods in the appropriate  amounts, at about the same times every day, can help you:  · Control your blood glucose.  · Lower your risk of heart disease.  · Improve your blood pressure.  · Reach or maintain a healthy weight.  Every person with diabetes is different, and each person has different needs for a meal plan. Your health care provider may recommend that you work with a diet and nutrition specialist (dietitian) to make a meal plan that is best for you. Your meal plan may vary depending on factors such as:  · The calories you need.  · The medicines you take.  · Your weight.  · Your blood glucose, blood pressure, and cholesterol levels.  · Your activity level.  · Other health conditions you have, such as heart or kidney disease.  How do carbohydrates affect me?  Carbohydrates, also called carbs, affect your blood glucose level more than any other type of food. Eating carbs naturally raises the amount of glucose in your blood. Carb counting is a method for keeping track of how many carbs you eat. Counting carbs is important to keep your blood glucose at a healthy level, especially if you use insulin or take certain oral diabetes medicines.  It is important to know how many carbs you can safely have in each meal. This is different for every person. Your dietitian can help you calculate how many carbs you should have at each meal and for each snack.  Foods that contain carbs include:  · Bread, cereal, rice, pasta, and crackers.  · Potatoes and corn.  · Peas, beans, and lentils.  · Milk and yogurt.  · Fruit and juice.  · Desserts, such as cakes, cookies, ice cream, and candy.  How does alcohol affect me?  Alcohol can cause a sudden decrease in blood glucose (hypoglycemia), especially if you use insulin or take certain oral diabetes medicines. Hypoglycemia can be a life-threatening condition. Symptoms of hypoglycemia (sleepiness, dizziness, and confusion) are similar to symptoms of having too much alcohol.  If your health care provider says  "that alcohol is safe for you, follow these guidelines:  · Limit alcohol intake to no more than 1 drink per day for nonpregnant women and 2 drinks per day for men. One drink equals 12 oz of beer, 5 oz of wine, or 1½ oz of hard liquor.  · Do not drink on an empty stomach.  · Keep yourself hydrated with water, diet soda, or unsweetened iced tea.  · Keep in mind that regular soda, juice, and other mixers may contain a lot of sugar and must be counted as carbs.  What are tips for following this plan?    Reading food labels  · Start by checking the serving size on the \"Nutrition Facts\" label of packaged foods and drinks. The amount of calories, carbs, fats, and other nutrients listed on the label is based on one serving of the item. Many items contain more than one serving per package.  · Check the total grams (g) of carbs in one serving. You can calculate the number of servings of carbs in one serving by dividing the total carbs by 15. For example, if a food has 30 g of total carbs, it would be equal to 2 servings of carbs.  · Check the number of grams (g) of saturated and trans fats in one serving. Choose foods that have low or no amount of these fats.  · Check the number of milligrams (mg) of salt (sodium) in one serving. Most people should limit total sodium intake to less than 2,300 mg per day.  · Always check the nutrition information of foods labeled as \"low-fat\" or \"nonfat\". These foods may be higher in added sugar or refined carbs and should be avoided.  · Talk to your dietitian to identify your daily goals for nutrients listed on the label.  Shopping  · Avoid buying canned, premade, or processed foods. These foods tend to be high in fat, sodium, and added sugar.  · Shop around the outside edge of the grocery store. This includes fresh fruits and vegetables, bulk grains, fresh meats, and fresh dairy.  Cooking  · Use low-heat cooking methods, such as baking, instead of high-heat cooking methods like deep " frying.  · Cook using healthy oils, such as olive, canola, or sunflower oil.  · Avoid cooking with butter, cream, or high-fat meats.  Meal planning  · Eat meals and snacks regularly, preferably at the same times every day. Avoid going long periods of time without eating.  · Eat foods high in fiber, such as fresh fruits, vegetables, beans, and whole grains. Talk to your dietitian about how many servings of carbs you can eat at each meal.  · Eat 4-6 ounces (oz) of lean protein each day, such as lean meat, chicken, fish, eggs, or tofu. One oz of lean protein is equal to:  ? 1 oz of meat, chicken, or fish.  ? 1 egg.  ? ¼ cup of tofu.  · Eat some foods each day that contain healthy fats, such as avocado, nuts, seeds, and fish.  Lifestyle  · Check your blood glucose regularly.  · Exercise regularly as told by your health care provider. This may include:  ? 150 minutes of moderate-intensity or vigorous-intensity exercise each week. This could be brisk walking, biking, or water aerobics.  ? Stretching and doing strength exercises, such as yoga or weightlifting, at least 2 times a week.  · Take medicines as told by your health care provider.  · Do not use any products that contain nicotine or tobacco, such as cigarettes and e-cigarettes. If you need help quitting, ask your health care provider.  · Work with a counselor or diabetes educator to identify strategies to manage stress and any emotional and social challenges.  Questions to ask a health care provider  · Do I need to meet with a diabetes educator?  · Do I need to meet with a dietitian?  · What number can I call if I have questions?  · When are the best times to check my blood glucose?  Where to find more information:  · American Diabetes Association: diabetes.org  · Academy of Nutrition and Dietetics: www.eatright.org  · National [employer] Diabetes and Digestive and Kidney Diseases (NIH): www.niddk.nih.gov  Summary  · A healthy meal plan will help you control your  blood glucose and maintain a healthy lifestyle.  · Working with a diet and nutrition specialist (dietitian) can help you make a meal plan that is best for you.  · Keep in mind that carbohydrates (carbs) and alcohol have immediate effects on your blood glucose levels. It is important to count carbs and to use alcohol carefully.  This information is not intended to replace advice given to you by your health care provider. Make sure you discuss any questions you have with your health care provider.  Document Released: 09/14/2006 Document Revised: 11/30/2018 Document Reviewed: 01/22/2018  Elsevier Patient Education © 2020 Elsevier Inc.             3

## 2020-10-12 NOTE — PRE-OP CHECKLIST - TYPE OF SOLUTION
Cystoscopy Operative Note (10/12/20):  Surgeon: Wing Odette MD  Anesthesia: Urethral 2% Xylocaine  Indications: hematuria  Position: Dorsal Lithotomy    Findings:   Risks and Benefits discussed with patient prior to procedure. The patient was prepped and draped in the usual sterile fashion. The flexible cystoscope was advanced through the urethra and into the bladder. The bladder was thoroughly inspected and the following was noted:    Vagina: normal appearing vagina with normal color and discharge, no lesions  Residual Urine: none  Urethra: normal appearing urethra with no masses, tenderness or lesions  Bladder: No tumors or CIS noted. No bladder diverticulum. There was none trabeculation noted. Ureters: Clear efflux from both ureters. Orifices with normal configuration and location. The cystoscope was removed. The patient tolerated the procedure well. Agree with the ROS entered by the MA.     Plan: f/u one year ua d51/2ns

## 2020-10-23 NOTE — PROVIDER CONTACT NOTE (OTHER) - NAME OF MD/NP/PA/DO NOTIFIED:
sera Fontaine Unique Flap 2 Text: The flap is incised and reflected downward. The medial portion of the flap was dissected just deep to the ordicularis oculi, and laterally the flap is dissected to the lateral canthal tendon area. The flap is rotated and advanced into place and sutured in a multilaminal fashion. The mucosa defect does not require closure. At completion, the repair is under little tension and the lid is well supported. All vertical tension is directed onto the temple.

## 2020-11-17 NOTE — DISCHARGE NOTE PROVIDER - NSTOBACCOUSAGEY/N_GEN_A_CS
[facility] rx flagyl 500mg bid x7days. 11/17/20 @0807 Left msg on  to check [employer] for results. Result ltr mailed. No

## 2021-01-01 NOTE — PROGRESS NOTE ADULT - SUBJECTIVE AND OBJECTIVE BOX
MEJIA RESTREPOREMBA:[id number],   82yMale followed for:  penicillin (Rash)    PAST MEDICAL & SURGICAL HISTORY:  Coronary artery disease: s/p 3 stents on June 30, 2017 at [facility]. Lavelle Reid  OA (osteoarthritis) of knee: right  Former smoker, stopped smoking in distant past  Former smoker  Rheumatoid arthritis  Seizure disorder: 1 episode approximately 15 yrs ago  Asthma  Hyperlipidemia  BPH (benign prostatic hyperplasia)  HTN (hypertension)  Esophagus cancer  Chronic allergic rhinitis  BCC (basal cell carcinoma): skin  Cerebrovascular accident (CVA)  Anxiety  History of tonsillectomy  H/O heart artery stent: June 30, 2017  Knee arthropathy: left  History of total hip replacement: left  History of hernia repair    FAMILY HISTORY:  Family history of heart attack (Sibling)  Family history of pulmonary embolism (Father): father @ 49 yr old  FH: CAD (coronary artery disease) (Mother)    MEDICATIONS  (STANDING):  heparin  Injectable 5000 Unit(s) SubCutaneous every 8 hours  aspirin Suppository 300 milliGRAM(s) Rectal daily  phenytoin  IVPB 200 milliGRAM(s) IV Intermittent two times a day  dexamethasone  Injectable 1 milliGRAM(s) IV Push daily  pantoprazole  Injectable 40 milliGRAM(s) IV Push daily  dornase ziyad Solution 2.5 milliGRAM(s) Inhalation daily  furosemide   Injectable 10 milliGRAM(s) IV Push two times a day  LORazepam   Injectable 0.5 milliGRAM(s) IV Push two times a day  ALBUTerol/ipratropium for Nebulization 3 milliLiter(s) Nebulizer every 6 hours    MEDICATIONS  (PRN):  ondansetron Injectable 4 milliGRAM(s) IV Push every 6 hours PRN Nausea  LORazepam   Injectable 0.5 milliGRAM(s) IV Push every 8 hours PRN Anxiety      Vital Signs Last 24 Hrs  T(C): 36.7 (26 Aug 2017 09:17), Max: 37.1 (25 Aug 2017 17:09)  T(F): 98.1 (26 Aug 2017 09:17), Max: 98.7 (25 Aug 2017 17:09)  HR: 86 (26 Aug 2017 09:17) (76 - 96)  BP: 130/60 (26 Aug 2017 09:17) (91/38 - 134/61)  BP(mean): --  RR: 20 (26 Aug 2017 09:17) (16 - 20)  SpO2: 100% (26 Aug 2017 09:17) (95% - 100%)  nc/at  s1s2  cta  soft, nt, nd no guarding or rebound  no c/c/e    CBC Full  -  ( 26 Aug 2017 06:24 )  WBC Count : 13.12 K/uL  Hemoglobin : 8.3 g/dL  Hematocrit : 26.4 %  Platelet Count - Automated : 236 K/uL  Mean Cell Volume : 100.4 fL  Mean Cell Hemoglobin : 31.6 pg  Mean Cell Hemoglobin Concentration : 31.4 %  Auto Neutrophil # : x  Auto Lymphocyte # : x  Auto Monocyte # : x  Auto Eosinophil # : x  Auto Basophil # : x  Auto Neutrophil % : x  Auto Lymphocyte % : x  Auto Monocyte % : x  Auto Eosinophil % : x  Auto Basophil % : x    08-26    146<H>  |  107  |  17  ----------------------------<  130<H>  3.8   |  30  |  0.68    Ca    8.7      26 Aug 2017 06:24 [id number]

## 2021-02-04 NOTE — PHYSICAL THERAPY INITIAL EVALUATION ADULT - SITTING BALANCE: DYNAMIC
----- Message from Stephani Ritchiestephanie Cliff Israel sent at 1/31/2021  4:17 PM EST -----  Hey  Discharged from [facility] today   Should be seen in office in next two weeks  Blood work in chart to be done  Thank you
Virtual telephone appt made, due to patient not being able to get out of the house  Freeman Quiroz is going to check with the home care to see if they can draw labs and let us know 
good balance

## 2021-03-15 NOTE — H&P PST ADULT - CARDIOVASCULAR COMMENTS
Clear bilaterally, pupils equal, round and reactive to light. had chest pain on Sunday and had angioplasty on Monday August 7

## 2021-03-18 NOTE — PATIENT PROFILE ADULT - FUNCTIONAL SCREEN CURRENT LEVEL: SWALLOWING (IF SCORE 2 OR MORE FOR ANY ITEM, CONSULT REHAB SERVICES), MLM)
HOSPITALIST DISCHARGE INSTRUCTIONS  NAME: General Prescott   :  1934   MRN:  [id number]     Date/Time:  3/18/2021 12:27 PM    ADMIT DATE: 3/15/2021     DISCHARGE DATE: 3/18/2021     ADMITTING DIAGNOSIS:  R humerus fracture    DISCHARGE DIAGNOSIS:  R humerus fracture    MEDICATIONS:    · It is important that you take the medication exactly as they are prescribed. · Keep your medication in the bottles provided by the pharmacist and keep a list of the medication names, dosages, and times to be taken in your wallet. · Do not take other medications without consulting your doctor. If you experience any of the following symptoms then please call your primary care physician or return to the emergency room if you cannot get hold of your doctor:  Fever, chills, nausea, vomiting, diarrhea, change in mentation, falling, bleeding, shortness of breath    Follow Up:  Kecia Quigley, MD  you are to call and set up an appointment to see them in 1 week. Information obtained by :  I understand that if any problems occur once I am at home I am to contact my physician. I understand and acknowledge receipt of the instructions indicated above.                                                                                                                                            Physician's or R.N.'s Signature                                                                  Date/Time                                                                                                                                              Patient or Representative Signature                                                          Date/Time 0 = swallows foods/liquids without difficulty

## 2021-03-23 NOTE — H&P PST ADULT - HEMATOLOGY/LYMPHATICS
Called [facility] pharmacy and discussed medication regimen for Lamictal.  Patient had obtained the first Rx written on 3/1/2021  She is to  the next titration Rx on 3/30/2021 for Weeks 5-8  Pharmacy does have an Rx on file to start Weeks 9 and 10 onwards with goal dosing of 100mg BID. details…

## 2021-04-06 NOTE — DISCHARGE NOTE ADULT - NS MD DC FALL RISK RISK
Linn Catharpin For information on Fall & Injury Prevention, visit [email or web address]

## 2021-04-19 NOTE — BRIEF OPERATIVE NOTE - PRIMARY SURGEON
Dr. Cohen
Js
Have Your Skin Lesions Been Treated?: not been treated
Is This A New Presentation, Or A Follow-Up?: Skin Lesions

## 2021-06-16 NOTE — PATIENT PROFILE ADULT. - INFORMATION COULD NOT BE OBTAINED DETAILS
DISCHARGE INSTRUCTION:    Drink plenty of fluids   Limit your caffeine intake until your infection is resolved    Take antibiotics as prescribed   You may take over the counter Azo for the next 2 days for symptom relief   You will be called in 2-3 days with your urine culture results     Seek immediate attention for the development of fever or back pain   You should seek a re revaluation if your symptoms are not improved in the next 3 days or if your symptoms are not 100% resolved by your last antibiotic pill      Vidhya Harris FNP  Family Nurse Practitioner   
pt cannot remember MD information

## 2021-06-29 NOTE — PATIENT PROFILE ADULT. - NS PRO OT REFERRAL QUES 2 YN
Cardiac Surgery Progress Note    Subjective: Examined. Pt remains intubated and sedated.    History Of Present Illness  Patient is a 31 year old male with a past history of bipolar disorder, obstructive sleep apnea, morbid obesity, and anxiety. Patient presented with Acute Respiratory collapse for COVID-19 virus infection requiring emergent placement of VV ECMO 2/12/21 s/p revision to VA ECMO 3/5/21 s/p decannulation 5/27/21.     Past Medical History   has a past medical history of Anxiety, Bipolar disorder, unspecified (CMS/[facility]), Morbid obesity (CMS/[facility]), MALKA (obstructive sleep apnea), Staphylococcus aureus bacteremia with sepsis (CMS/[facility]) (2/28/2021), and Ventilator-associated bacterial pneumonia (2/26/2021).  Surgical History   has no past surgical history on file.     Family History  family history is not on file.    Review of Systems: Unable to obtain.    Physical Exam  Vitals reviewed.   Constitutional:       Appearance: He is ill-appearing.      Interventions: He is sedated and intubated.   HENT:      Head: Normocephalic and atraumatic.   Cardiovascular:      Rate and Rhythm: Regular rhythm. Tachycardia present.   Pulmonary:      Effort: He is intubated.   Abdominal:      General: Bowel sounds are normal. There is no distension.      Palpations: Abdomen is soft.   Musculoskeletal:      Comments: Unable to assess.   Skin:     General: Skin is warm and dry.   Neurological:      Comments: Sedated.   Psychiatric:      Comments: Sedated.       I have independently reviewed all vitals, labs, and imaging documented below.    Last Recorded Vitals  Temp:  [98.1 °F (36.7 °C)-99.1 °F (37.3 °C)] 98.1 °F (36.7 °C)  Heart Rate:  [115-127] 125  Resp:  [0-40] 40  Arterial Line BP: (121-152)/(49-65) 128/52  FiO2 (%):  [100 %] 100 %    Labs  Recent Labs   Lab 06/29/21  0259 06/28/21 2050 06/28/21  1159 06/28/21  0259   SODIUM 142 141  --  139   POTASSIUM 4.3 4.4 4.5 5.2*   CHLORIDE 101 100  --  98   CO2 37* 37*  --  37*   BUN  64* 69*  --  67*   CREATININE 0.84 0.93  --  1.15   GLUCOSE 86 97  --  100*   CALCIUM 9.4 9.3  --  8.9     Recent Labs   Lab 06/29/21  0259 06/28/21 2050 06/28/21  0259 06/27/21  0329   SODIUM 142 141 139 139   CHLORIDE 101 100 98 99   CO2 37* 37* 37* 37*   BUN 64* 69* 67* 61*   CREATININE 0.84 0.93 1.15 1.07   CALCIUM 9.4 9.3 8.9 8.6   ALBUMIN 3.0*  --  3.4* 2.9*   BILIRUBIN 1.2*  --  1.3* 1.3*   ALKPT 118*  --  137* 134*   GPT 80*  --  117* 155*   AST 54*  --  75* 96*   GLUCOSE 86 97 100* 103*     Recent Labs   Lab 06/29/21  0300   WBC 18.1*   RBC 3.84*   HGB 11.2*   HCT 39.1             Assessment    Patient is a 31 year old male with history of bipolar disorder, obstructive sleep apnea, morbid obesity, anxiety. S/p RVAD ECMO. After rapid decompensation with severe hypoxemia, patient underwent VA ECMO implantation and was weaned off of VV ECMO. Patient with cardiac tamponade and vfib on 3/12/21 requiring mediastinal exploration with evacuation of hematoma. Patient is s/p VA ECMO decannulation 5/27/21. Pt was re-intubated on 6/16/2021 due to worsening respiratory and mental status.       Hemothorax s/p R VATs, thoracotomy, evacuation of hematoma 5/10/2021  - Thoracic following.     ARDS 2/2 COVID-10 PNA s/p VV ECMO 2/22/21 s/p VA ECMO 3/05/21  - Received Remdesivir and Dexamethasone.  - VV ECMO dc'd 3/18/21.   - VA ECMO dc'd 5/27/21.  - ID following.     Cdiff  - ID following.     HTN        Plan    -Transfuse 1 u of pRBCs.  -Initiate Albumin therapy.  -Administer IVP Lasix 40 mg once today.  -Dc Milrinone gtt.  -Wean paralytics.  -Increase tube feeds to 40 ml/hr.      Charting performed by cayla Sanders for Dr. Barros.    All medical record entries made by the scribe were at my direction. I have reviewed the chart and agree that the record accurately reflects my personal performance of the history, physical exam, hospital course, and assessment and plan.      no

## 2021-07-07 NOTE — H&P ADULT - NSHPPOADEEPVENOUSTHROMB_GEN_A_CORE
Plan for Preventive Care      An important way to stay healthy is to use preventive services provided by doctors and health care providers.  Preventive services can find health problems early when treatment works best and can keep you from getting certain diseases or illnesses.  To complete your personal preventive care plan, you are in need of the following Health Maintenance screening services. The date it is due is listed after the specific service.     Health Maintenance   Topic Date Due   • Medicare Wellness Visit  09/08/2021   • DTaP/Tdap/Td Vaccine (1 - Tdap) 07/08/2021 (Originally 4/4/1969)   • Shingles Vaccine (1 of 2) 07/31/2021 (Originally 4/4/2000)   • Influenza Vaccine (1) 08/01/2021   • Breast Cancer Screening  10/28/2021   • Depression Screening  06/17/2022   • Colorectal Cancer Screen-  06/09/2026   • Osteoporosis Screening  Completed   • Hepatitis C Screening  Completed   • COVID-19 Vaccine  Completed   • Pneumococcal Vaccine 65+  Completed   • Hepatitis B Vaccine  Aged Out   • Meningococcal Vaccine  Aged Out   • HPV Vaccine  Aged Out          Preventive Care for Women and Men    Cardiovascular Screening:  · Blood tests for cholesterol, lipid and triglyceride levels. High levels increase your risk for heart disease and stroke. High levels can be treated with medications, diet and exercise. Lowering your levels can help keep your heart and blood vessels healthy.  Your provider will order these tests if necessary.     Colorectal Screening:  · There are several tests to check for colorectal cancer. You and your doctor will discuss what test is best for you and when to have it done.   · Fecal Occult Blood Test: a sample of your stool is studied to find any unseen blood  · Flexible Sigmoidoscopy: exam of the sigmoid portion (last third) of the colon and rectum, seen through a flexible lighted tube.  · Screening Colonoscopy: exam of the entire colon, seen through a flexible lighted tube     Flu  Shot:  · An immunization that helps to prevent influenza. You should get this every year. The best time to get the shot is in the fall.    Pneumococcal Shot:  · An immunization that helps prevent several types of pneumonia. Most people only need this shot once in their lifetime.    Hepatitis B Shot:  · An immunization that helps to protect people from getting Hepatitis B. Hepatitis B is a virus that spreads through contact with infected blood or body fluids. Many people with the virus do not have symptoms.  The virus can lead to serious problems, such as liver disease. Some people are at higher risk than others. Your doctor will tell you if this shot is recommended for you.    Diabetes Screening:  · A test to measure glucose (sugar) in your blood called a fasting blood sugar. Fasting means you cannot have food or drink for at least 8 hours before the test. This test can detect diabetes long before you may notice symptoms.    Glaucoma Screening:  · Glaucoma screening is performed by your eye doctor. The test measures the fluid pressure inside your eyes to detect if you have glaucoma.     Smoking and Tobacco-Use Cessation Counseling:  · Tobacco is the single greatest cause of disease and premature death in our country today. Medication and counseling together can increase a person’s chance of quitting for good.   · Medicare covers 2 quitting attempts per year, with 4 sessions per attempt (8 sessions in a twelve month period).    Preventive Care for Women    Screening Mammograms and Breast Exams:  · An x-ray of your breasts to check for breast cancer before you or your doctor may be able to feel it.  Breast cancer found early can usually be treated with success.    Pelvic Exams and Pap Tests:  An exam to check for cervical and vaginal cancer. A Pap test is a lab test in which cells are taken from your cervix and sent to the lab to detect signs of cervical cancer. When cancer of the cervix is found early, chances for a  cure are good. Testing can generally end at age 65, or if a woman has a hysterectomy for a benign condition. A woman's primary care physician will advise more frequent testing if certain abnormalities are found.    Bone Mass Measurements:  · A painless x-ray measurement of your bone density to see if you are at risk for suffering a broken bone. Density refers to the amount of bone or how tightly the bone tissue is packed.    Preventive Care for Men    Prostate Screening:  · PSA - a prostate cancer blood test. The [place] Preventive Services Task Force recommends against routine screening of healthy men with no signs or symptoms of prostate disease. Men should not ignore urinary symptoms, and discuss their family history with their doctor/provider.      Insurance pays for many preventive services to keep you healthy. For some of these services, you might have to pay a deductible, coinsurance, and / or copayment.  The amounts vary depending on the type of services you need and the kind of health plan you have.                 At [facility], one important tool we use to improve our patient services is our Patient Survey.  Following your visit you may receive our survey in the mail.    Please take the time to complete the survey.    If your visit with us was great, we want to hear about it.    If we can improve, please let us know how.     Patient Education     Established High Blood Pressure    High blood pressure (hypertension) is a chronic disease. Often, healthcare providers don’t know what causes it. But it can be caused by certain health conditions and medicines.  If you have high blood pressure, you may not have any symptoms. If you do have symptoms, they may include headache, dizziness, changes in your vision, chest pain, and shortness of breath. But even without symptoms, high blood pressure that’s not treated raises your risk for heart attack, heart failure, and stroke. High blood pressure is a  serious health risk and shouldn’t be ignored.  Blood pressure measurements are given as 2 numbers. Systolic blood pressure is the upper number. This is the pressure when the heart contracts. Diastolic blood pressure is the lower number. This is the pressure when the heart relaxes between beats. You will see your blood pressure readings written together. For example, a person with a systolic pressure of 118 and a diastolic pressure of 78 will have 118/78 written in the medical record.  Blood pressure is categorized as normal, elevated, or stage 1 or stage 2 high blood pressure:  · Normal blood pressure is systolic of less than 120 and diastolic of less than 80 (120/80)  · Elevated blood pressure is systolic of 120 to 129 and diastolic less than 80  · Stage 1 high blood pressure is systolic is 130 to 139 or diastolic between 80 to 89  · Stage 2 high blood pressure is when systolic is 140 or higher or the diastolic is 90 or higher  Home care  If you have high blood pressure, follow these home care guidelines to help lower your blood pressure. If you are taking medicines for high blood pressure, these methods may reduce or end your need for medicines in the future.  · Start a weight-loss program if you are overweight.  · Cut back on how much salt you get in your diet. Here’s how to do this:  ? Don’t eat foods that have a lot of salt. These include olives, pickles, smoked meats, and salted potato chips.  ? Don’t add salt to your food at the table.  ? Use only small amounts of salt when cooking.  · Start an exercise program. Talk with your healthcare provider about the type of exercise program that would be best for you. It doesn't have to be hard. Even brisk walking for 20 minutes 3 times a week is a good form of exercise.  · Don’t take medicines that stimulate the heart. This includes many over-the-counter cold and sinus decongestant pills and sprays, as well as diet pills. Check the warnings about high blood pressure  on the label. Before buying any over-the-counter medicines or supplements, always ask the pharmacist about the product's potential interaction with your high blood pressure and your high blood pressure medicines.  · Stimulants such as amphetamine or cocaine could be deadly for someone with high blood pressure. Never take these.  · Limit how much caffeine you get in your diet. Switch to caffeine-free products.  · Stop smoking. If you are a long-time smoker, this can be hard. Talk to your healthcare provider about medicines and nicotine replacement options to help you. Also, enroll in a stop-smoking program to make it more likely that you will quit for good.  · Learn how to handle stress. This is an important part of any program to lower blood pressure. Learn about relaxation methods like meditation, yoga, or biofeedback.  · If your provider prescribed medicines, take them exactly as directed. Missing doses may cause your blood pressure get out of control.  · If you miss a dose or doses, check with your healthcare provider or pharmacist about what to do.  · Consider buying an automatic blood pressure machine to check your blood pressure at home. Ask your provider for a recommendation. You can get one of these at most pharmacies.     The American Heart Association recommends the following guidelines for home blood pressure monitoring:  · Don't smoke or drink coffee for 30 minutes before taking your blood pressure.  · Go to the bathroom before the test.  · Relax for 5 minutes before taking the measurement.  · Sit with your back supported (don't sit on a couch or soft chair); keep your feet on the floor uncrossed. Place your arm on a solid flat surface (like a table) with the upper part of the arm at heart level. Place the middle of the cuff directly above the bend of the elbow. Check the monitor's instruction manual for an illustration.  · Take multiple readings. When you measure, take 2 to 3 readings one minute apart  and record all of the results.  · Take your blood pressure at the same time every day, or as your healthcare provider recommends.  · Record the date, time, and blood pressure reading.  · Take the record with you to your next medical appointment. If your blood pressure monitor has a built-in memory, simply take the monitor with you to your next appointment.  · Call your provider if you have several high readings. Don't be frightened by a single high blood pressure reading, but if you get several high readings, check in with your healthcare provider.  · Note: When blood pressure reaches a systolic (top number) of 180 or higher OR diastolic (bottom number) of 110 or higher, seek emergency medical treatment.  Follow-up care  You will need to see your healthcare provider regularly. This is to check your blood pressure and to make changes to your medicines. Make a follow-up appointment as directed. Bring the record of your home blood pressure readings to the appointment.  When to seek medical advice  Call your healthcare provider right away if any of these occur:  · Blood pressure reaches a systolic (upper number) of 180 or higher OR a diastolic (bottom number) of 110 or higher  · Chest pain or shortness of breath  · Severe headache  · Throbbing or rushing sound in the ears  · Nosebleed  · Sudden severe pain in your belly (abdomen)  · Extreme drowsiness, confusion, or fainting  · Dizziness or spinning sensation (vertigo)  · Weakness of an arm or leg or one side of the face  · You have problems speaking or seeing   Date Last Reviewed: 12/1/2016  © 2582-1039 [employer]. [address] [place] [address], [place] [address]. All rights reserved. This information is not intended as a substitute for professional medical care. Always follow your healthcare professional's instructions.    Patient Education     Low-Salt Diet  This diet removes foods that are high in salt. It also limits the amount of salt you use when cooking.  It is most often used for people with high blood pressure, edema (fluid retention), and kidney, liver, or heart disease.  Table salt contains the mineral sodium. Your body needs sodium to work normally. But too much sodium can make your health problems worse. Your healthcare provider is recommending a low-salt (also called low-sodium) diet for you. Your total daily allowance of salt is 1,500 to 2,300 milligrams (mg). It is less than 1 teaspoon of table salt. This means you can have only about 500 to 700 mg of sodium at each meal. People with certain health problems should limit salt intake to the lower end of the recommended range.    When you cook, don’t add much salt. If you can cook without using salt, even better. Don’t add salt to your food at the table.  When shopping, read food labels. Salt is often called sodium on the label. Choose foods that are salt-free, low salt, or very low salt. Note that foods with reduced salt may not lower your salt intake enough.    Beans, potatoes, and pasta  Ok: Dry beans, split peas, lentils, potatoes, rice, macaroni, pasta, spaghetti without added salt  Avoid: Potato chips, tortilla chips, and similar products  Breads and cereals  Ok: Low-sodium breads, rolls, cereals, and cakes; low-salt crackers, matzo crackers  Avoid: Salted crackers, pretzels, popcorn, Malay toast, pancakes, muffins  Dairy  Ok: Milk, chocolate milk, hot chocolate mix, low-salt cheeses, and yogurt  Avoid: Processed cheese and cheese spreads; Roquefort, Camembert, and cottage cheese; buttermilk, instant breakfast drink  Desserts  Ok: Ice cream, frozen yogurt, juice bars, gelatin, cookies and pies, sugar, honey, jelly, hard candy  Avoid: Most pies, cakes and cookies prepared or processed with salt; instant pudding  Drinks  Ok: Tea, coffee, fizzy (carbonated) drinks, juices  Avoid: Flavored coffees, electrolyte replacement drinks, sports drinks  Meats  Ok: All fresh meat, fish, poultry, low-salt tuna, eggs,  egg substitute  Avoid: Smoked, pickled, brine-cured, or salted meats and fish. This includes rios, chipped beef, corned beef, hot dogs, deli meats, ham, kosher meats, salt pork, sausage, canned tuna, salted codfish, smoked salmon, herring, sardines, or anchovies.  Seasonings and spices  Ok: Most seasonings are okay. Good substitutes for salt include: fresh herb blends, hot sauce, lemon, garlic, treadwell, vinegar, dry mustard, parsley, cilantro, horseradish, tomato paste, regular margarine, mayonnaise, unsalted butter, cream cheese, vegetable oil, cream, low-salt salad dressing and gravy.  Avoid: Regular ketchup, relishes, pickles, soy sauce, teriyaki sauce, Worcestershire sauce, BBQ sauce, tartar sauce, meat tenderizer, chili sauce, regular gravy, regular salad dressing, salted butter  Soups  Ok: Low-salt soups and broths made with allowed foods  Avoid: Bouillon cubes, soups with smoked or salted meats, regular soup and broth  Vegetables  Ok: Most vegetables are okay; also low-salt tomato and vegetable juices  Avoid: Sauerkraut and other brine-soaked vegetables; pickles and other pickled vegetables; tomato juice, olives  Date Last Reviewed: 8/1/2016  © 9612-1740 [employer]. [address] [place] [address], [place] [address]. All rights reserved. This information is not intended as a substitute for professional medical care. Always follow your healthcare professional's instructions.            no

## 2021-07-09 NOTE — PATIENT PROFILE ADULT - NSPROPTRIGHTREPNAME_GEN_A__NUR
Called pt  No ans lvm re apt scheduled for 8/16/2021 will need to can and r.s same day but in the morning.    Detail Level: Zone spouse

## 2021-08-03 NOTE — PACU DISCHARGE NOTE - NSCLINEINSERTRD_GEN_ALL_CORE
Fax received from [facility] regarding Pt  Pt location at SNF: CS wing  Fax sent by:     Fax regarding concern: update x2    Assessment:     1)    2)      Any recommendations or new orders?       No

## 2021-08-10 NOTE — ED PROVIDER NOTE - NS ED MD EM SELECTION
CC: Joy is a 19 year old  @ 37w2d for delivery consult for breech presentation    [id number]: denies Vaginal Bleeding, denies Leaking of Fluid and denies Contractions  +FM    OBJECTIVE:  Visit Vitals  /82   Pulse 97   Temp 96.3 °F (35.7 °C) (Temporal)   Ht 4' 11\" (1.499 m)   Wt 95.9 kg   LMP 2020 (Exact Date)   SpO2 98%   BMI 42.70 kg/m²     Gen: NAD  FH: See episode  Ext: NT/NE    Limited US: cephalic    ASSESSMENT:  Joy is a 19 year old  @ 37w2d for delivery consult for breech presentation    PLAN:  #Breech, now cephalic: IOL scheduled for  at 39wk, BMI 42, discussed briefly ECV, cephalic today, getting weekly  testing will re-discuss if becomes breech    Follow up with MFM as nishant Hayes MD    
[id number] Comprehensive

## 2021-08-19 NOTE — DISCHARGE NOTE ADULT - LAUNCH MEDICATION RECONCILIATION
<<-----Click here for Discharge Medication Review Fluconazole Counseling:  Patient counseled regarding adverse effects of fluconazole including but not limited to headache, diarrhea, nausea, upset stomach, liver function test abnormalities, taste disturbance, and stomach pain.  There is a rare possibility of liver failure that can occur when taking fluconazole.  The patient understands that monitoring of LFTs and kidney function test may be required, especially at baseline. The patient verbalized understanding of the proper use and possible adverse effects of fluconazole.  All of the patient's questions and concerns were addressed.

## 2021-09-07 NOTE — OCCUPATIONAL THERAPY INITIAL EVALUATION ADULT - ADL RETRAINING, OT EVAL
[GOAL: Pt will be independent with dressing in 4 weeks.

## 2021-09-15 NOTE — PATIENT PROFILE ADULT. - PRO ARRIVE FROM
Called and advised. She states she spoke with the patient after speaking with me and mentioned that the pt did have diarrhea right before the abdominal pain started and that this was his first bowel movement since surgery.    home

## 2021-10-08 NOTE — PATIENT PROFILE ADULT - IS THERE A SUSPICION OF ABUSE/NEGLIGENCE?
[FreeTextEntry1] : Recovered well from surgery.  No further GI bleeding.  Recommend increasing diet and activity.  Follow-up as needed.\par \par Advised continued surveillance colonoscopy with primary gastroenterologist for routine surveillance of colorectal polyps and cancer.\par \par 
no

## 2021-12-07 NOTE — H&P CARDIOLOGY - NEUROLOGICAL
Ongoing SW/CM Assessment/Plan of Care Note     See SW/CM flowsheets for goals and other objective data.    Patient/Family discharge goal (s):  Goal #1: Psychosocial needs assessed  Goal #2: Discharge to other institution(s) arranged  Goal #3: Communication facilitated    PT Recommendation:  Recommendation for Discharge: PT WI: Less intensive rehab       OT Recommendation:  Recommendations for Discharge: OT WI: Less intensive rehab       Disposition:  Planned Discharge Destination: Rehabilitation/Skilled Care    Progress note:   Patient accepted at [facility] for AMA. Per OFIsabela, no planned discharge today d/t pain control issues. Writer updated Elisa in admissions at [facility]. Writer updated patient and daughter, Trina.     ADDENDUM 11:33- Per MD, patient will be ready for discharge tomorrow. MD updated patient and Trina- both in agreement. Writer updated Elisa in admissions at [facility]- insurance authorization has been started, awaiting outcome.    ADDENDUM 14:51- [facility] has a W/C van scheduled for transport at 13:00 tomorrow (12/8). Writer updated MD/RN, patient and Trina.     Social work following.           negative Alert & oriented; no sensory, motor or coordination deficits, normal reflexes

## 2021-12-15 NOTE — DIETITIAN INITIAL EVALUATION ADULT. - PERTINENT LABORATORY DATA
Patient saw Dr Melissa 11/10/21. Recent thyroid labs show low TSH and low T3 and normal free T4. She is not aware of thryoid symptoms.  Patient reports historically this time of year she gets anxiety and \"melt downs\" and starts crying. States she had stopped Effexor in April 2020 so she has been off it for a 1.5 years. Prior to that she had been on it for 10-15 years. She believes she has seasonal affective disorder. She wonders if she could start at 37.5 mg XR dose and she would like to restart that. Kyle Kuhn. She does have a few left and previously had started at the 37.5mg dose and increased to 75 but thinks maybe just the 37.5mg dose will work now that she is retired.. She is leaving for 6 weeks next week to [place].   She would like to go ahead and have endocrinologist referral to [facility] as she is aware it will be a wait to get in.         Please advise.   glucose 262

## 2021-12-22 NOTE — DISCHARGE NOTE PROVIDER - NSDCCONDITION_GEN_ALL_CORE
[facility]   [address]  [facility], [address] [facility]  Phone: [phone]  [phone]        12/22/2021      Gab Bradford MD  [address] [place] [address] [facility] [address]    Patient: Ana Wayne  YOB: 1974  Date of Visit: 12/22/2021  MRN:  [id number]      Dear Dr. Gab Bradford MD      SUBJECTIVE:   It was a pleasure to see Ana Wayne  in the Pediatric Neurology Clinic at [facility]. He is a 52 y.o. male accompanied by his caregiver Corina Almanzar to this visit for a follow-up neurological evaluation. INTERIM PROGRESS:  EPILEPSY:   Yudy Del Cid, caregiver, denies witnessing any breakthrough seizures since the last visit. His last witnessed seizure occurred in 1994. His last EEG was completed 9/8/21 which was abnormal,  limited EEG due to the mild amount of diffuse slowing in the theta range seen during the study.  These waveforms are not considered epileptiform in nature.  This constellation of findings indicates a moderate degree of cerebral dysfunction and suggestive of mild to moderate diffuse neuronal dysfunction.  On some occasions, electrode and movement artefact was noted in the F3, C3, and C4 channels, which limited the interpretation at times. His seizure description in the past was reported as grand mal seizures. He continues to take Trileptal in this regard, without any reports of side effects or concerns. CEREBRAL PALSY:  Yudy Del Cid reports that he continues to exhibit spasticity. He is wheel chair bound. Spasticity is more in the lower extremities, he is unable to stand unassisted and uses a stander. Mirian Gurdeep states that he will be going for a PEG tube placement in the future to supplement his liquid intake. Nguyễn Wyatt continues to require assistance with his feedings and ADL's. Nguyễn Wyatt continues to experience restricted range of motion at the knee. He continues to receive physical and occupational therapies at [facility].  He continues to take Zanaflex in this regard with no reported side effects or concerns. Previous Medications Tried: Baclofen (ineffective)    REVIEW OF SYSTEMS:  Constitutional: Negative. Eyes: Legally blind. Respiratory: Negative. Cardiovascular: Negative. Gastrointestinal: Negative. Genitourinary: Negative. Musculoskeletal: Positive for spastic Quadriparesis. Skin: Negative. Neurological: Negative for headaches, positive for seizures, positive for developmental delays. Hematological: Negative. Psychiatric/Behavioral: Negative for behavioral issues, Negative for ADHD     All other systems reviewed and are negative. Past, social, family, and developmental history was reviewed and unchanged. OBJECTIVE:   PHYSICAL EXAM:    Neurological: he is alert. Vibha Lizarraga sat in his wheelchair comfortably. He continued to exhibits some lip smacking movements and smiled at the examiners spontaneously. The tone was noted to be increased in the extremities. He made unintelligible sounds and shouted sometimes during the visit. Mild amount of spasticity was noted all over in the upper extremities with no restriction of range of motion. He was able to move his hands above his shoulder level. He was able to lift his lower extremities in the air, but there was significant spasticity noted in the hamstrings which made it difficult to extend the lower extremity. On a baseline he is totally dependent on caregiver and is moved with the help of a lift. Reflex Scores: 3+ diffuse. No focal weakness noted on exam.      Constitutional: [x] Appears well-developed and well-nourished [x] No apparent distress      [] Abnormal-   Mental status  [x] Alert and awake  [] Oriented to person/place/time [x]Unable to follow commands at baseline. Eyes:  EOM    [x]  Normal  [] Abnormal-  Sclera  [x]  Normal  [] Abnormal -         Discharge [x]  None visible  [] Abnormal -    HENT:   [x] Normocephalic, atraumatic.   [] Abnormal   [x] Mouth/Throat: Mucous membranes are moist.     External Ears [x] Normal  [] Abnormal-     Neck: [x] No visualized mass     Pulmonary/Chest: [x] Respiratory effort normal.  [x] No visualized signs of difficulty breathing or respiratory distress        [] Abnormal-      Musculoskeletal:   [] Normal gait with no signs of ataxia         [x] Normal range of motion of neck        [x] Abnormal-  As above    Neurological:        [x] No Facial Asymmetry (Cranial nerve 7 motor function) (limited exam to video visit)          [x] No gaze palsy        [] Abnormal-         Skin:        [x] No significant exanthematous lesions or discoloration noted on facial skin         [] Abnormal-            Psychiatric:       [] Normal Affect [] No Hallucinations        [x] Abnormal- As above      RECORD REVIEW: Previous medical records were reviewed at today's visit. DIAGNOSTIC STUDIES:  11/15/2015 - EEG - This is an abnormal EEG due to generalized slowing as well as epileptiform discharges.  The generalized slowing is suggestive of diffuse neuronal dysfunction.  In addition, there  were focal discharges seen during the study as mentioned above. These waveforms are considered to be epileptiform in nature.  This constellation of findings is consistent with a partial epileptogenic  abnormality and an increased risk of seizures in the future. 12/10/2015 - MRI Brain - Marked white matter volume loss throughout both cerebral hemispheres with marked thinning of the corpus callosum and enlarged lateral ventricles with undulating borders and mild increased signal intensity on FLAIR sequence in the periventricular white matter. 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 Head of the mandibular condyles are markedly enlarged bilaterally. No associated edema or joint effusions evident. Visualized muscles of mastication do not appear enlarged. This is of indeterminate etiology.  Correlate for any symptoms referrable to this area. Small amount of fluid right mastoid air cells. Correlate for signs or symptoms of infection. 03/01/2018 - EEG - Abnormal due to generalized slowing as well as epileptiform discharges.  The generalized slowing is suggestive of diffuse neuronal dysfunction.  In addition, there were occasional sharp waves seen in the right frontal, central, and parietal regions.  These waveforms are considered epileptiform in nature and suggest the presence of an epileptogenic focus as well as an increased risk of partial seizures in the future. 09/08/2021 - EEG - This is an abnormal, limited EEG due to the mild amount of diffuse slowing in the theta range seen during the study.  These waveforms are not considered epileptiform in nature.  This constellation of findings indicates a moderate degree of cerebral dysfunction and suggestive of mild to moderate diffuse neuronal dysfunction.  On some occasions, electrode and movement artefact was noted in the F3, C3, and C4 channels, which limited the interpretation at times. Ref.  Range 9/22/2021 05:45   Sodium Latest Ref Range: 135 - 144 mmol/L 143   Potassium Latest Ref Range: 3.7 - 5.3 mmol/L 3.8   Chloride Latest Ref Range: 98 - 107 mmol/L 108 (H)   CO2 Latest Ref Range: 20 - 31 mmol/L 26   BUN Latest Ref Range: 6 - 20 mg/dL 15   Creatinine Latest Ref Range: 0.70 - 1.20 mg/dL 0.65 (L)   Anion Gap Latest Ref Range: 9 - 17 mmol/L 9   GFR Non- Latest Ref Range: >60 mL/min >60   GFR African American Latest Ref Range: >60 mL/min >60   Glucose Latest Ref Range: 70 - 99 mg/dL 76   Calcium Latest Ref Range: 8.6 - 10.4 mg/dL 8.7   Albumin/Globulin Ratio Latest Ref Range: 1.0 - 2.5  1.5   Total Protein Latest Ref Range: 6.4 - 8.3 g/dL 6.5   Albumin Latest Ref Range: 3.5 - 5.2 g/dL 3.9   Alk Phos Latest Ref Range: 40 - 129 U/L 115   ALT Latest Ref Range: 5 - 41 U/L 32   AST Latest Ref Range: <40 U/L 23   Bilirubin Latest Ref Range: 0.3 - 1.2 mg/dL 0.25 (L)   TSH Latest Ref Range: 0.30 - 5.00 mIU/L 2.29   WBC Latest Ref Range: 3.5 - 11.3 k/uL 4.5   RBC Latest Ref Range: 4.21 - 5.77 m/uL 4.56   Hemoglobin Quant Latest Ref Range: 13.0 - 17.0 g/dL 13.5   Hematocrit Latest Ref Range: 40.7 - 50.3 % 43.0   Platelet Count Latest Ref Range: 138 - 453 k/uL 195       ASSESSMENT:   Joao De Jesus is a 52 y.o. male with:  1. Epilepsy with the last reported seizure occurring in May 1994.   2. Choreoathetoid cerebral palsy   3. Static Encephalopathy  4. Severe Spastic Quadriparesis  5. Spasticity, which continues to remain a concern. PLAN:   1. Continue Tegretol at 800 mg twice daily. 2. Continue Zanaflex  10 mg three times a day. 3. Recommend to get Electrolytes, CBC, AST, ALT every 6 months. 4. The use of Diastat 12.5 mg to be administered rectally for seizures lasting more than 3 minutes was discussed with the caregiver. 5. Seizure precautions were recommended to be maintained. The caregiver was instructed to notify our clinic if Sveta Back has any breakthrough seizures for an earlier appointment. 6. Anticipatory guidance and preventative counseling was provided regarding seizure precautions; and first aid measures during seizures was discussed in detail. 7. I plan to see Sveta Back back in 5 months or earlier if needed. Written by Zacarias Nuñez RN acting as scribe for Dr. Brent Mendoza. 12/22/2021  8:02 AM    I have reviewed and made changes accordingly to the work scribed by Zacarias Nuñez RN. The documentation accurately reflects work and decisions made by me. Clayton Ohara MD   Pediatric Neurology & Epilepsy  12/22/2021          Joao De Jesus is a 52 y.o. male being evaluated in the presence of his caregiver by a video visit encounter for neurological concerns as above.   Due to this being a TeleHealth encounter (During LEO-64 public health emergency), evaluation of the following organ systems is limited: Stable

## 2022-02-10 NOTE — DISCHARGE NOTE ADULT - CARE PROVIDERS DIRECT ADDRESSES
Patient instructed to remove shoes and socks and instructed to sit in exam chair. Current PCP is Magdalene Grubbs MD and date of last visit was 12/22/2021. Do you have a follow up visit scheduled? No  If yes, the date is       Diabetic visit information    Blood pressure (Control is BP <140/90)  BP Readings from Last 3 Encounters:   12/22/21 (!) 174/93   10/25/21 127/80   09/20/21 138/72       BP taken with correct size cuff? - Yes   Repeated if > 140/90 Yes      Tobacco use:  Patient  reports that she has been smoking cigarettes. She has a 0.75 pack-year smoking history. She has never used smokeless tobacco.  If Smoker - Cessation materials given? - Yes       Diabetic Health Maintenance Items due  Diabetes Management   Topic Date Due    Diabetic microalbuminuria test  01/29/2015    Lipid screen  02/01/2021    Diabetic foot exam  08/13/2021       Diabetic retinal exam done in last year? - Yes   If No: remind patient that it is due and they should schedule an exam    Medications  Is patient taking any medications for diabetes? -   Yes  Have blood sugars been controlled? Fasting blood sugars under 120   -   Yes   Random home sugars or today's POCT glucose is under 180 -   Yes   []  If No to the above then patient should schedule appt with PCP.      Diabetic Plan    A1C Plan  Lab Results   Component Value Date    LABA1C 7.1 06/03/2021    LABA1C 7.3 02/01/2021    LABA1C 6.5 08/13/2020      []  If A1C over 8 and last result >3 months ago - Order A1C and refer to PCP   []  If last A1C over 6 months ago - Order A1C and refer to PCP for follow up   []  If elevated blood sugars > 180 - refer to PCP for follow up    []  Blood sugar controlled - A1C under 8 and last check was < 6 months      Cholesterol Plan   Lab Results   Component Value Date    LDLCHOLESTEROL 43 02/01/2020      []  If LDL > 100 and last result >3 months ago - order Fasting lipids and refer to PCP for follow up   []  If LDL < 100 and over 1 year ,[email or web address]

## 2022-02-26 NOTE — H&P ADULT - NSHPPHYSICALEXAM_GEN_ALL_CORE
T(C): 36.7 (12-14-19 @ 11:57), Max: 36.7 (12-14-19 @ 10:42)  HR: 82 (12-14-19 @ 11:57) (82 - 82)  BP: 149/67 (12-14-19 @ 11:57) (126/73 - 149/67)  RR: 18 (12-14-19 @ 11:57) (18 - 18)  SpO2: 97% (12-14-19 @ 11:57) (97% - 97%)  Wt(kg): --Vital Signs Last 24 Hrs  T(C): 36.7 (14 Dec 2019 11:57), Max: 36.7 (14 Dec 2019 10:42)  T(F): 98.1 (14 Dec 2019 11:57), Max: 98.1 (14 Dec 2019 10:42)  HR: 82 (14 Dec 2019 11:57) (82 - 82)  BP: 149/67 (14 Dec 2019 11:57) (126/73 - 149/67)  BP(mean): --  RR: 18 (14 Dec 2019 11:57) (18 - 18)  SpO2: 97% (14 Dec 2019 11:57) (97% - 97%)    PHYSICAL EXAM:  GENERAL: NAD, well-groomed, well-developed  HEAD:  Atraumatic, Normocephalic  EYES: EOMI, PERRLA, conjunctiva and sclera clear  ENMT: No tonsillar erythema, exudates, or enlargement; Moist mucous membranes, Good dentition, No lesions  NECK: Supple, No JVD, Normal thyroid  NERVOUS SYSTEM:  Alert & Oriented X3, Good concentration; Motor Strength 5/5 B/L upper and lower extremities; DTRs 2+ intact and symmetric  CHEST/LUNG: systolic murmur noted, no wheezes, diminished bilaterally   HEART: Regular rate and rhythm; No murmurs, rubs, or gallops  ABDOMEN: Soft, Nontender, Nondistended; Bowel sounds present  EXTREMITIES:  2+ Peripheral Pulses, No clubbing, cyanosis, or edema  LYMPH: No lymphadenopathy noted  SKIN: No rashes or lesions
No

## 2022-04-20 NOTE — PATIENT PROFILE ADULT - NSASFUNCLEVELADLAMBULATE_GEN_A_NUR
3 = assistive equipment and person Azathioprine Counseling:  I discussed with the patient the risks of azathioprine including but not limited to myelosuppression, immunosuppression, hepatotoxicity, lymphoma, and infections.  The patient understands that monitoring is required including baseline LFTs, Creatinine, possible TPMP genotyping and weekly CBCs for the first month and then every 2 weeks thereafter.  The patient verbalized understanding of the proper use and possible adverse effects of azathioprine.  All of the patient's questions and concerns were addressed.

## 2022-07-15 NOTE — PROVIDER CONTACT NOTE (OTHER) - BACKGROUND
Yesterday felt very poorly but feeling better today. Tylenol or Advil for headaches, body aches and fevers/chills. Mucinex 1200mg twice a day (plain not D or DM) and Saline nasal spray 2 squirts each nostril 2-3 times a day for congestion. Rest, lots of fluids. Your first day of symptoms is Day 0. You need to isolate at home away from other family members house occupants Day 1-5. On Day 6 if you are feeling better or if you only had mild symptoms you can leave your house but you MUST wear a good fitting mask Day 6-10. Please call the office or seek emergency help if you have shortness of breath walking through your house. admitted for hypotension, adrenal insuff

## 2022-07-29 NOTE — ED PROVIDER NOTE - GASTROINTESTINAL, MLM
Lmm to inform pt not to  the Clonazepam if she isn't taking it  Asked for return call if she has any questions 
Terry Side
Abdomen soft, non-tender, no guarding.

## 2022-09-29 NOTE — PATIENT PROFILE ADULT - INSURANCE HELP
I saw and evaluated the patient, performing the key elements of the service. I discussed the findings, assessment and plan with the resident and agree with the resident's findings and plan as documented in the resident's note. no

## 2022-10-07 NOTE — PHYSICAL THERAPY INITIAL EVALUATION ADULT - DIAGNOSIS, PT EVAL
[NS_[email or web address]_ALL_OB_FT:[id number]],[NS_DeliveryAssist1_OBGYN_ALL_OB_FT:[id number]],[NS_DeliveryRN_OBGYN_ALL_OB_FT:[id number]],[NS_DeliveryAttending1_OBGYN_ALL_OB_FT:[id number]] Impairments in balance and strength

## 2022-11-25 NOTE — H&P CARDIOLOGY - HISTORY OF PRESENT ILLNESS
PATIENT INFORMATION    Anticipatory guidance discussed  Importance of regular dental care  Importance of regular exercise  Importance of varied diet    Follow-Up  - Return in 1 year for your yearly well visit.    13-17 years old Health and Safety Tips - The following hyperlinks are available to access via [employer]    Parent Education from Healthy Parent    Educación para padres sobre niños sanos    Additional Educational Resources:  For additional resources regarding your symptoms, diagnosis, or further health information, please visit the Discover a Healthier You section on [email or web address] or the Online Health Resources section in [employer].    Your child child has been referred to a Pediatric Endocrinologist.  Please call to schedule appointment.    Dr. Joaquin Szymanski or Dr. Milagro Dean  [facility]  [address] [place] [id number]  Phone: [phone]  Fax: [phone]    -----    [address] [facility] [address] [facility], [address]  [place] [address]  Phone: [phone]    -----    [address] [place], [place] [address], [place] [address]  Phone: [phone]     OR    Dr. Ally Chance or Dr. Cecille Atkins or Vielka Masters  [address] [address]  Phone: [phone]  Fax: [phone]          PPO PATIENTS ONLY:    Dr. ARGENTINA Kilpatrick, Dr. Rose Goddard, Dr. Vielka Masters   [facility]  [address] [facility] [address]  [place] [address]   Phone # [phone]  Fax # [phone]    
82 year old  male pt with PMHX of CAD with stents x5, esophageal cancer s/p esophagectomy, HTN, HLD, anxiety, arthritis, recent shingles, asthma presents for cardiac cath. Pt reports he had an episode of chest pain(mid sternal chest pressure radiated to right arm, lasted about an hour on 4/11), went to [facility] ER and released after having some blood works and EKG. Then pt had f/u visit with Leif Santiago this morning then referred for cath. pt denies chest pain since ER visit.

## 2023-01-16 NOTE — PROVIDER CONTACT NOTE (CRITICAL VALUE NOTIFICATION) - NAME OF MD/NP/PA/DO NOTIFIED:
HISTORY AND PHYSICAL      Patient: Thao Jain Date: 1/15/2023   female, 57 year old  Admit Date: 1/15/2023   Primary Care: Marzenna M Schoeneich, MD     DATE OF SERVICE 1/15/2023    ADMITTING PHYSICIAN    Lance Estrada DO      CHIEF COMPLAINT    Welfare check by police    HPI    Thao Jain is a 57 year old female with PMHx anxiety, depression, bipolar disorder, chronic hepatitis C, COPD, diabetes, hx non-adherence, chronic diarrhea    Patient has had multiple ER visits and hospitalization at [facility] for approximately 2 weeks, admitted on 12/23/2022.  Initial presentation very similar to today.  Left AMA during this hospitalization. Per note in care-everywhere:  57-year-old patient who is admitted for right medial thigh abscess and acute respiratory distress syndrome. Patient was intubated and was extubated on 1/4/2023. Patient is requesting to be discharged tonight. I did explain patient that she is actively on treatment with IV antibiotics and I would not recommend to discharge. Patient is requesting to discharge AGAINST MEDICAL ADVICE. Patient is alert, oriented to time place and person. Patient initially had healthcare power of  activated when she was intubated. She is completely alert and oriented now. Healthcare power of  was deactivated.  I spent a long time explaining to patient why she has to stay in the hospitalization is on active treatment. All efforts to convince patient to stay in the hospital for. Patient is requesting to be discharged AGAINST MEDICAL ADVICE.   By the time I went back to the floor patient had signed AMA forms and left to the hospital. I could not get the opportunity to discharge patient with antibiotics. I will discuss this with primary attending so the patient would be sent outpatient antibiotics.      Patient presents to the ER today after family called welfare check and police found patient confused, on the ground covered in her own stool.  Brought  in for further evaluation.  When seen at bedside, she is eating Jell-O and a sandwich at a very rapid rate.  Her speech is pressured.  She reports that she has not eaten in 2 days.  Has been in bed largely due to generalized weakness.  Denies any focal neurologic problems.  Denies any pain.  Reports that she does have musculoskeletal chest pain, shortness of breath and cough.  The chest pain is after she had CPR performed at outside hospital.  She has a wound on her right leg, she is unsure if it is getting better or worse as it is in a place that is hard for her to see.  She does not follow with wound care.  She does not take her insulin at home or check blood sugar.  She does report being compliant with her psychiatric medications.  Denies any recent impulsivity although one could argue that leaving AMA multiple times from ER and hospitals over the last few weeks could count.  Denies any depression or suicidal ideation.  Reports that her wound frequently gets covered in stool, any time she eats she has diarrhea.  This has been an issue for 10 years.  Reports this has been worked up before in the past and never told what her diagnosis was.  Has just learned to live with it. At home would wear adult diapers and sit in them, likely cause of wound in the first place. Significant self-neglect. She is A&Ox3 in the ER but seems to have very poor insight into her overall medical conditions.    Afebrile, heart rate 90, respiratory rate 15, blood pressure 220/108 improved to 172/82, 100% on 2 L nasal cannula, BMI 27    Potassium 5.4, glucose 563, creatinine 1.13, AST 76, alkaline phosphatase 430 CPK negative, NT proBNP 75122, troponin I negative, lactic acid negative, procalcitonin 0.2, WBC 7.5, hemoglobin 9, platelet 524, blood culture pending, urine culture pending a dysuria, proteinuria, small blood, 11-25 RBC, 11-25 WBC, no bacteria    Chest x-ray with atelectasis or consolidation left lung base    EKG normal sinus  rhythm    REVIEW OF SYSTEMS    All 13 Systems where reviewed and found to be negative except what's mentioned in HPI    PAST MEDICAL & SURGICAL HISTORY    Past Medical History:   Diagnosis Date   • Anxiety    • Bipolar 1 disorder (CMS/HCC)    • Chronic hepatitis C without hepatic coma (CMS/HCC) 2010   • COPD (chronic obstructive pulmonary disease) (CMS/HCC)    • Depression    • Diabetes mellitus (CMS/HCC)    • hepatitis C    • MRSA (methicillin resistant Staphylococcus aureus)    • Occasional tremors 2022   • Other chronic pain     arthritis, degenerative disc\   • PTSD (post-traumatic stress disorder)    • Staphylococcus aureus infection    • Urinary tract infection    • Vestibular neuronitis 2022     Past Surgical History:   Procedure Laterality Date   • Breast surgery     • Carpal tunnel release     •  section, classic     • Cholecystectomy     • Hysterectomy     • Knee surgery     • Rotator cuff repair         FAMILY HISTORY    Family History   Problem Relation Age of Onset   • Stroke Mother    • Heart disease Maternal Grandmother        SOCIAL HISTORY    Social History     Tobacco Use   • Smoking status: Every Day     Packs/day: 1.00     Types: Cigarettes   • Smokeless tobacco: Never   Substance Use Topics   • Alcohol use: No     Alcohol/week: 2.0 standard drinks     Types: 2 Shots of liquor per week   • Drug use: Yes     Types: Methamphetamines     Comment: 3 days ago       CURRENT MEDICATIONS    (Not in a hospital admission)      ALLERGIES    ALLERGIES:   Allergen Reactions   • Codeine Other (See Comments)     hyperactivity  hyperactivity     • Hydrocodone RASH   • Hydrocodone-Acetaminophen NAUSEA   • Opioid Analgesics NAUSEA and Nausea & Vomiting     nausea  nausea     • Propoxyphene N-Apap VOMITING       PHYSICAL EXAMINATION    Vital 24 Hour Range Most Recent Value   Temperature Temp  Min: 98.3 °F (36.8 °C)  Max: 98.3 °F (36.8 °C) 98.3 °F (36.8 °C)   Pulse Pulse  Min: 73  Max: 90 75    Respiratory Resp  Min: 15  Max: 50 (!) 50   Blood Pressure BP  Min: 173/84  Max: 220/108 (!) 173/84   Pulse Oximetry SpO2  Min: 86 %  Max: 100 % (!) 86 %     GENERAL: Chronically ill appearing, A&Ox3, NC in place  EYES: pink conjunctivae, PERRLA  ENT: dry oral mucosa, oropharyx clear, intact gross hearing  NECK: trachea is midline  CARDIOVASCULAR: regular rate and rhythm, no gallops/rubs/murmurs  PULMONARY: Increased effort, dullness bilateral bases  GI: soft, non-distended, non-tender, normal bowel sounds, no peritoneal signs  PSYCH: memory intact, poor insight, hypomanic, pressured speech  SKIN: R posterior leg erythema, induration, R medial thigh/groin deep wound with surrounding erythema  MSK: 1+ LE edema, digits normal in appearance  NEURO: CN II-XII grossly intact bilaterally, motor grossly normal UE/LE, sensory grossly intact to light touch UE/LE      LABS    Recent Labs   Lab 01/15/23  2119   SODIUM 135   POTASSIUM 5.4*   CHLORIDE 99   CO2 27   BUN 24*   CREATININE 1.13*   GLUCOSE 563*   WBC 7.5   HGB 9.0*   HCT 29.9*   *     No results found  No results found for this or any previous visit.   No results found for this or any previous visit.  Lab Results   Component Value Date    USPG 1.025 01/15/2023    UPROT 100 (A) 01/15/2023    UWBC Negative 01/15/2023    URBC Small (A) 01/15/2023    UPH 5.5 01/15/2023       IMAGING & OTHER STUDIES   REVIEWED PERSONALLY ON 11:55 PM AT 1/15/2023  XR CHEST AP OR PA - PORTABLE    Result Date: 1/15/2023  Narrative: XR CHEST AP OR PA HISTORY: hypoxia COMPARISON:  12/17/2022 FINDINGS: Right upper lobe infiltrate remains present but improved from 12/17/2022. There is no atelectasis or consolidation at the left lung base with left pleural effusion. Consider follow-up CT imaging for better characterization of pulmonary opacities. No acute osseous abnormality.     Impression: IMPRESSION: 1.  Atelectasis or consolidation at the left lung base with small left effusion. This  Fortino VILLEGAS finding is new in comparison to 12/17/2022. 2.  Right upper lobe infiltrate noted on previous examination has improved.     XR CHEST AP OR PA - PORTABLE    Result Date: 12/17/2022  Narrative: XR CHEST AP OR PA HISTORY: hyperglycemic , cough COMPARISON:  7/26/2006 FINDINGS: Normal heart, mediastinum and pulmonary vasculature. Patchy opacity in the right upper lobe suspicious for infiltrate. Left lung is clear. No effusion or pneumothorax. Recommend follow-up radiographs to document clearing. No acute osseous abnormality.     Impression: IMPRESSION: 1.  Patchy airspace opacity in the right upper lobe suspicious for infiltrate. Recommend follow-up to document clearing.     XR ABD KUB    Result Date: 1/3/2023  Narrative: INTERPRETATION LOCATION: [facility] PROCEDURE:  XR ABD KUB VIEW(S):   AP supine DATE:  1/3/2023 21:43 COMPARISONS: Same day CLINICAL INDICATION: 57 years Female  tube position      Impression: FINDINGS/impression: Transesophageal enteric tube is noted with the side-port and tube tip within the gastric lumen. The tube tip is pointed near the GE junction. Dictated By: Terrell Ibrahim MD Dictated On: 01/03/2023 21:45:07 Electronically Signed By: Terrell Ibrahim MD Electronically Signed on: 01/03/2023 21:45:40    XR CHEST PORTABLE    Result Date: 1/3/2023  Narrative: INTERPRETATION LOCATION: [facility] PROCEDURE:  XR CHEST PORTABLE VIEW(S):   AP DATE:  1/3/2023 17:20 COMPARISONS: 12/23/2022 CLINICAL INDICATION: 57 years Female  ETT placement      Impression: FINDINGS: Endotracheal tube tip terminates in the distal intrathoracic trachea approximately 2.3 cm proximal to parmjit. Transesophageal enteric tube in suboptimal position looped within the stomach with the tube tip located in the distal esophagus. Recommend repositioning. Heart is normal in size. Perihilar patchy airspace opacities are present in the setting of ulnar vascular distention. Lateral costophrenic angles are clear. No pneumothorax. IMPRESSION: 1.  Support apparatus as described in the findings. 2. Patchy perihilar airspace opacities which could reflect edema given the presence of pulmonary vascular distention. Atypical or viral infiltrate should also be considered in the differential. Dictated By: Terrell Ibrahim MD Dictated On: 01/03/2023 17:22:35 Electronically Signed By: Terrell Ibrahim MD Electronically Signed on: 01/03/2023 17:24:14    XR ABD FEED TUBE PLCMNT 1V WO    Result Date: 1/3/2023  Narrative: INTERPRETATION LOCATION: [facility] PROCEDURE:  XR ABD FEED TUBE PLCMNT 1V WO DATE:  1/3/2023 17:19 COMPARISONS: 02/25/2020 CLINICAL INDICATION: 57 years Female  OG Placement      Impression: Findings/impression: Transesophageal enteric tube is looped back on itself within the gastric lumen with the tip located in the distal esophagus and the side-port within the gastric fundus. Recommend repositioning the tube. Nonobstructive bowel gas pattern. Prior cholecystectomy. Dictated By: Terrell Ibrahim MD Dictated On: 01/03/2023 17:20:13 Electronically Signed By: Terrell Ibrahim MD Electronically Signed on: 01/03/2023 17:22:22    CT HEAD WO CON    Result Date: 1/2/2023  Narrative: INTERPRETATION LOCATION: [facility] PROCEDURE:  CT HEAD WO CON DATE:  1/2/2023 14:35 COMPARISONS: Multiple prior comparisons CLINICAL INDICATION: 57 years Female  Head trauma, minor (Age >= 65y)   TECHNIQUE:  Helical images were obtained from the skull base through the calvarium.  Coronal and sagittal reformations were created.  Individualized dose optimization technique was used for the procedure performed.     Impression: FINDINGS: There is no intra or extra-axial hemorrhage. No mass effect or midline shift. No definite acute loss of gray-white differentiation. The ventricles and cerebral sulci are within normal limits for the patient's age. Minimal periventricular white matter hypodensity. The orbits are within normal limits. The paranasal sinuses and mastoid air cells are clear. There  is no fracture. IMPRESSION: No acute intracranial pathology. Dictated By: Perez Morejon MD Dictated On: 01/02/2023 14:38:37 Electronically Signed By: Perez Morejon MD Electronically Signed on: 01/02/2023 14:41:02    CT PEL WO CON    Result Date: 12/23/2022  Narrative: INTERPRETATION LOCATION: [facility] CT scan pelvis without contrast December 23, 2022. COMPARISON: None. CLINICAL INDICATION: Right inguinal swelling. Evaluate for abscess.    Impression: FINDINGS: Unenhanced.  Individualized dose optimization technique was used for procedure performed.. Inflammatory changes are noted in the subcutaneous tissues on the right along the medial thigh indicating cellulitis. No drainable fluid collection.  Intrapelvic structures appear unremarkable. Bones do not show fracture or dislocation or destructive process. IMPRESSION: Medial right thigh cellulitis. No drainable fluid. Dictated By: Tye Diaz MD Dictated On: 12/23/2022 22:44:42 Electronically Signed By: Tye Diaz MD Electronically Signed on: 12/23/2022 22:45:51    CT HEAD WO CON    Result Date: 12/23/2022  Narrative: INTERPRETATION LOCATION: [facility] CT brain without contrast December 23, 2022. COMPARISON: Head CT June. CLINICAL INDICATION: Delirium. Change in mentation.    Impression: FINDINGS: Unenhanced.  Individualized dose optimization technique was used for procedure performed.. No midline shift. No ventriculomegaly. Mass or mass effect is not seen. No acute hemorrhage. No abnormal extra-axial fluid collection. Insular ribbons are intact. No abnormal fluid in the sinuses or mastoid air cells. IMPRESSION: No acute intracranial abnormality. Dictated By: Tye Diaz MD Dictated On: 12/23/2022 21:01:30 Electronically Signed By: Tye Diaz MD Electronically Signed on: 12/23/2022 21:07:28    XR CHEST PORTABLE    Result Date: 12/23/2022  Narrative: INTERPRETATION LOCATION: [facility] Portable chest x-ray December 23,  2022. 2035 hours. COMPARISON: Radiograph 2020. CLINICAL INDICATION: Mentation change.    Impression: FINDINGS: No pneumothorax or effusion or consolidation or edema. Cardiac and mediastinal silhouettes are stable. IMPRESSION: No acute process. Dictated By: Tye Diaz MD Dictated On: 12/23/2022 20:49:05 Electronically Signed By: Tye Diaz MD Electronically Signed on: 12/23/2022 20:49:44       EKG: as noted above if performed      ASSESSMENT & PLAN:    Sepsis, possible HCAP, R thigh/leg cellulitis, necrotizing skin/soft tissue infection felt less likely  -Empiric IV cefepime, flagyl, vancomycin  -General surgery consulted, will get CT ab/pelvis when able but as I saw patient in ER she was eating a sandwich so will likely need to wait a bit  -Wound care consult  -CT ab/pelvis and CT R leg    Suspected hypomania, poor insight:  -Psychiatry consulted  -To my knowledge patient is not on a hold, case manger to review in AM, regardless if tries to leave AMA would contact crisis / APS due to patients ongoing pattern of self neglect    Hx aspiration on scrambled eggs 1/3/22 with cardiac arrest, dysphagia:  -Appears hypomanic in ER demonstrating similar behavior, psychiatry consult  -SLT consult, might need 1:1 supervision  -Leave NPO for now   -Sitter added, hx impulsivity, falling, etc    Chronic diarrhea:  -Workup for C diff, stool culture, ova/parasite, and HIV (consent obtained verbally)  -Consider medical treatment if above workup negative, ongoing stool ing is contaminating wound making healing difficult    Acute hypoxic respiratory failure: due to pneumonia, atelectasis  -Abx as above  -Wean oxygen as able  -Encourage IS    Hyperglycemia without DKA:  -Insulin infusion, rapid goal of <180 in setting of sepsis/wound  -Ongoing non-adherence contributing    Hyperkalemia: will correct with insulin and IVF, monitor, telemetry    Anemia, hx GI bleed, gastric erosions:  -PPI, seen at GI at outside facility and EGD  performed    Markedly elevated BNP: do not have a good explanation for this, trop negative, MSK chest pain from recent CPR, not overtly fluid overloaded  -Recheck in AM, along with echo, ANEUDY is minimal actually improving from prior hospitalization    Malnutrition, wound:  -Dietary consulted, vitamin    Co-morbidities:  -anxiety, depression, bipolar disorder, chronic hepatitis C, COPD, diabetes, hx non-adherence, chronic diarrhea    FEN: NPO until SLT evaluation, /hr  PPx: BRENDA, hold chem ppx until surgery evaluation  Dispo:  -Code status full  -Dispo: PT/OT ordered  -No guardian/POAHC  -Patient is not medically stable for discharge today    Admission Status: inpatient, anticipate > 2 midnight    Lance Estrada DO  1/15/2023  11:55 PM

## 2023-02-09 NOTE — DISCHARGE NOTE PROVIDER - REASON FOR ADMISSION
Pt states she went to the Pharmacy and the Clobetasol was $100+ with her insurance. Pt wanting to see if there is a cheaper option. Please advise. dysphagia  EGD/possible dilation

## 2023-02-10 NOTE — ED PROVIDER NOTE - PR
248 1st degree Graft Donor Site Bandage (Optional-Leave Blank If You Don't Want In Note): Steri-strips and a pressure bandage were applied to the donor site.

## 2023-02-20 NOTE — PATIENT PROFILE ADULT - NSPROPTRIGHTBILLOFRIGHTS_GEN_A_NUR
patient Bexarotene Pregnancy And Lactation Text: This medication is Pregnancy Category X and should not be given to women who are pregnant or may become pregnant. This medication should not be used if you are breast feeding.

## 2023-03-29 NOTE — CONSULT NOTE ADULT - PROBLEM SELECTOR RECOMMENDATION 5
Quality 130: Documentation Of Current Medications In The Medical Record: Current Medications Documented Detail Level: Detailed Time spent 45 min  Link to GOC note Quality 111:Pneumonia Vaccination Status For Older Adults: Pneumococcal vaccine (PPSV23) administered on or after patient’s 60th birthday and before the end of the measurement period

## 2023-04-07 NOTE — DIETITIAN INITIAL EVALUATION ADULT. - FACTORS AFF FOOD INTAKE
Skip [address] [facility]  Medication Management  ANTICOAGULATION    Referring Provider: Dr. Tg Quezada INR: 2-3    TODAY'S INR: 1    WARFARIN Dosage: Warfarin 10 mg po today and tomorrow, then 7.5 mg po every Monday and Friday; 5 mg po all other days    INR (no units)   Date Value   04/07/2023 1   04/04/2023 1   03/27/2023 1.2   03/26/2023 1.4   03/26/2023 1.6   03/26/2023 2.5   03/26/2023 2.6       Hemoglobin   Date Value Ref Range Status   04/04/2023 11.6 (L) 11.9 - 15.1 g/dL Final     Hematocrit   Date Value Ref Range Status   04/04/2023 35.0 (L) 36.3 - 47.1 % Final     ALT   Date Value Ref Range Status   04/04/2023 48 (H) 5 - 33 U/L Final     AST   Date Value Ref Range Status   04/04/2023 48 (H) <32 U/L Final       Medication changes:  No changes    Notes:    Fingerstick INR drawn per clinic protocol. Patient states no visible blood in urine and no black tarry stool. Denies any missed doses of warfarin. No change in other maintenance medications or in diet. Will recheck INR in 3 days. Patient acknowledges working in consult agreement with pharmacist as referred by his/her physician. Patient was hospitalized for rectal bleeding 3/26/23-3/29/23. INR was 2.5, was having multiple bloody stools, received 2 unit PRBCs, 2 unit of blood. 3 L bolus and vitamin K.  CT abdomen was suggestive of diverticulitis with superimposed nonspecific infectious/inflammatory colitis. For Pharmacy Admin Tracking Only    Intervention Detail: Dose Adjustment: 2, reason: Therapy Optimization  Total # of Interventions Recommended: 2  Total # of Interventions Accepted: 2  Time Spent (min): KATT Allen 66.  [place], [facility] cancer treatment/other (specify)

## 2023-04-17 NOTE — ED ADULT TRIAGE NOTE - CCCP TRG CHIEF CMPLNT
Quality 226: Preventive Care And Screening: Tobacco Use: Screening And Cessation Intervention: Patient screened for tobacco use and is an ex/non-smoker
Detail Level: Detailed
states double vision/drooling x 3 days"

## 2023-04-18 NOTE — PATIENT PROFILE ADULT. - AS SC BRADEN ACTIVITY
Called to discuss with pt reason for rad/onc referral, no answer. LVM with call back number and forwarded msg to .     ----- Message from Mariia Luke RN sent at 4/18/2023  2:22 PM CDT -----  Regarding: needs assistance with transportation and information on tx plan  Patient stated she did not know she needed radiation, is confused about what is going on. Wants to talk with a  to calm her nerves about this new information and will need transportation if her son cannot bring her to radiation consult on 5-4-23 at 1pm. Patient stated she was told her CT scan looked good and everything was good after completing chemo so needing radiation is a shock to her.  Thanks     (3) walks occasionally

## 2023-04-26 NOTE — CONSULT NOTE ADULT - ASSESSMENT
esopahgeal cancer, s/p maryjane ford.  well known to me.  check ggt. will follow you. Nursing/Physical therapy

## 2023-05-03 NOTE — PATIENT PROFILE ADULT. - PAIN SCALE PREFERRED, PROFILE
Patient with upper/mid back pain that has been going on for the past 6-8 months.  Neck, shoulders, and low back can also be affected, with headaches as well.  Thought to be MSK in nature.  Somatic dysfunction noted as below.   OMT indicated and performed as noted in procedure note.  OMT performed to improve biomechanical function and lessen sxs.  Patient tolerated the procedure well and reported improvement of pain.    Recommending continuing yoga.  RTC in 3-4 weeks for reassessment.    
numerical 0-10

## 2023-05-30 NOTE — ED ADULT TRIAGE NOTE - ADDITIONAL SAFETY/BANDS...
-Use the Flonase (nasal spray) daily, 2 sprays each nostril   -take the prednisone once for 5 days. Take this in the morning with food  -Take the augmentin twice a day for a week  -Drink lots of fluids, you can drink warm tea with honey  -Take tylenol if needed, avoid ibuprofen when on the prednisone     Additional Safety/Bands:

## 2023-09-18 NOTE — ED ADULT NURSE NOTE - NS ED NURSE LEVEL OF CONSCIOUSNESS SPEECH
Congestive Heart Failure [address] [facility]       Referring Provider: Dr Prince Sotelo  Primary Care Physician: Kellie Thorpe MD   Cardiologist: Dr Prince Sotelo  Nephrologist: N/A      HISTORY OF PRESENT ILLNESS:     Rizwan Cordero is a 80 y.o. (1936) female with a history of HFrEF, most recent EF:  Lab Results   Component Value Date    LVEF 23 02/07/2023    Red [facility]ardi Echo 12/26/2016         She presents to the CHF clinic for ongoing evaluation and monitoring of heart failure.     In the CHF clinic today she denies any adverse symptoms except:  [x] Dizziness or lightheadedness (with position change )  [] Syncope or near syncope  [] Recent Fall  [] Shortness of breath at rest   [] Dyspnea with exertion  [] Decline in functional capacity (unable to perform activities they had previously been able to do)  [] Fatigue   [x] Orthopnea  [] PND  [] Nocturnal cough  [] Hemoptysis  [] Chest pain, pressure, or discomfort  [] Palpitations  [] Abdominal distention  [] Abdominal bloating  [] Early satiety  [] Blood in stool   [] Diarrhea  [x] Constipation ( off and on )  [] Nausea/Vomiting  [] Decreased urinary response to oral diuretic   [] Scrotal swelling   [] Lower extremity edema  [] Used PRN doses of oral diuretic   [] Weight gain    Wt Readings from Last 3 Encounters:   09/18/23 97 lb (44 kg)   08/21/23 95 lb (43.1 kg)   08/11/23 93 lb (42.2 kg)           SOCIAL HISTORY:  [x] Denies tobacco, alcohol or illicit drug abuse  [] Tobacco use:  [] ETOH use:  [] Illicit drug use:        MEDICATIONS:    No Known Allergies    Current Outpatient Medications:     omeprazole (PRILOSEC) 40 MG delayed release capsule, TAKE 1 CAPSULE BY MOUTH EVERY DAY, Disp: 90 capsule, Rfl: 0    Methylfol-Algae-N00-Xsfwkfygky (CEREFOLIN NAC) 6-90.314-2-600 MG TABS, Take by mouth daily, Disp: , Rfl:     losartan (COZAAR) 25 MG tablet, TAKE 1 TABLET BY MOUTH EVERY DAY, Disp: 90 tablet, Rfl: 0    metoprolol succinate
Speaking Coherently

## 2023-10-08 NOTE — GOALS OF CARE CONVERSATION - PERSONAL ADVANCE DIRECTIVE - TREATMENT GUIDELINE COMMENT
Pt arrives to ED c/o lower abd pain and vagial pain x1 day. Pt endorses dysuria, nausea and constipation. Rates pain 5/10. Pt is a poor historian. Pt also states \"there is a ball on my vaginal area\"  Past Medical History:   Diagnosis Date   • Abdominal pain     Started 11/2017   • Atrial septal defect     Surgical repair   • Diabetic mellitus, gestational     With both pregnancies, 2012 & 2016   • Diet-controlled diabetes mellitus (CMD)    • Esophageal reflux    • Gallbladder disease 03/2018   • Gastritis    • History of hypothyroidism     No longer on Levothyroxine.   • Legally blind     Has limited central vision, no periperal vision   • Nausea     Started 11/2017   • Retinitis pigmentosa of both eyes     Has limited central vision, no periperal vision       
Patient sleeping. Wife states he wants DNR as per [facility] signed at [facility].

## 2023-10-16 NOTE — PATIENT PROFILE ADULT. - NSTOBACCO TYPE_GEN_A_CORE_RD
Detail Level: Detailed
Quality 110: Preventive Care And Screening: Influenza Immunization: Influenza Immunization not Administered because Patient Refused.
Quality 431: Preventive Care And Screening: Unhealthy Alcohol Use - Screening: Patient not identified as an unhealthy alcohol user when screened for unhealthy alcohol use using a systematic screening method
Quality 226: Preventive Care And Screening: Tobacco Use: Screening And Cessation Intervention: Patient screened for tobacco use and is an ex/non-smoker
Cigarettes

## 2023-11-01 NOTE — PHYSICAL THERAPY INITIAL EVALUATION ADULT - LEVEL OF INDEPENDENCE: SIT/SUPINE, REHAB EVAL
Pt was started on med b/c her chol was 302.  She was supposed to have repeat fasting lab work in June /July to see how well medication was working. No refill until she gets that done. minimum assist (75% patients effort)

## 2023-12-08 NOTE — PROGRESS NOTE ADULT - PROVIDER SPECIALTY LIST ADULT
[facility]  [address], [place], [place], [address]  [phone]       Orthopedics

## 2024-03-07 NOTE — ED PROVIDER NOTE - MEDICAL DECISION MAKING DETAILS
grossly assessed at least 3+/5 throughout 83 y m copd exacerbation initially on c pap improved , hx of unwitnessed fall head injury

## 2024-03-13 NOTE — PATIENT PROFILE ADULT. - FALL HARM RISK TYPE OF ASSESSMENT
Procedure and recovery complete. Pt awake and alert. MD Mccoy and wife at bedside, procedure findings and suggestions discussed. Discharge instructions given, pt verbalized understanding of instruction. Iv was D/c'd, inner cannula intact. No distress noted. No c/o pain. Gait steady, able to ambulate without assistance. Pt walked out accompanied by wife.    Admission

## 2024-06-21 NOTE — ED ADULT NURSE REASSESSMENT NOTE - TEMPLATE LIST FOR HEAD TO TOE ASSESSMENT
Problem: Adjustment to Illness (Sepsis/Septic Shock)  Goal: Optimal Coping  Outcome: Ongoing, Progressing  Intervention: Optimize Psychosocial Adjustment to Illness  Recent Flowsheet Documentation  Taken 6/21/2024 0845 by Merly Oliver RN  Family/Support System Care: support provided     Problem: Bleeding (Sepsis/Septic Shock)  Goal: Absence of Bleeding  Outcome: Ongoing, Progressing     Problem: Glycemic Control Impaired (Sepsis/Septic Shock)  Goal: Blood Glucose Level Within Desired Range  Outcome: Ongoing, Progressing     Problem: Infection Progression (Sepsis/Septic Shock)  Goal: Absence of Infection Signs and Symptoms  Outcome: Ongoing, Progressing  Intervention: Promote Recovery  Recent Flowsheet Documentation  Taken 6/21/2024 1415 by Merly Oliver RN  Activity Management: up in chair  Taken 6/21/2024 1205 by Merly Oliver RN  Activity Management: up in chair  Taken 6/21/2024 1050 by Merly Oliver RN  Activity Management: back to bed  Taken 6/21/2024 0845 by Merly Oliver RN  Activity Management: up in chair  Taken 6/21/2024 0725 by Merly Oliver RN  Activity Management: activity encouraged     Problem: Nutrition Impaired (Sepsis/Septic Shock)  Goal: Optimal Nutrition Intake  Outcome: Ongoing, Progressing     Problem: Fall Injury Risk  Goal: Absence of Fall and Fall-Related Injury  Outcome: Ongoing, Progressing  Intervention: Promote Injury-Free Environment  Recent Flowsheet Documentation  Taken 6/21/2024 1415 by Merly Oliver RN  Safety Promotion/Fall Prevention: safety round/check completed  Taken 6/21/2024 1205 by Merly Oliver RN  Safety Promotion/Fall Prevention: safety round/check completed  Taken 6/21/2024 1050 by Merly Oliver RN  Safety Promotion/Fall Prevention: safety round/check completed  Taken 6/21/2024 0845 by Merly Oliver RN  Safety Promotion/Fall  Abdominal Pain, N/V/D Prevention: safety round/check completed  Taken 6/21/2024 0725 by Merly Oliver, RN  Safety Promotion/Fall Prevention: safety round/check completed   Goal Outcome Evaluation:      PT is JOELLE CARVER. Cardiology adjusted Pt's home medication to try to get her heart rate under control. First dose of Digoxin given. No complained during this shift. Will continue to monitor.

## 2024-07-01 NOTE — PHYSICAL THERAPY INITIAL EVALUATION ADULT - GAIT DEVIATIONS NOTED, PT EVAL
Labs were ordered.  Did add a PSA test as well.   decreased stride length/decreased weight-shifting ability/decreased step length

## 2024-07-26 NOTE — H&P CARDIOLOGY - PEDAL EDEMA TYPE
[FreeTextEntry3] : I personally saw and examined Ms. TALYA HOLT in detail this visit today. I personally reviewed the HPI, PMH, FH, SH, ROS and medications/allergies. I have spoken to VALERIE Berry regarding the history and have personally determined the assessment and plan of care, and documented this myself. I was present and participated in all key portions of the encounter and all procedures noted above. I have made changes in the body of the note where appropriate.   Attesting Faculty: See Attending Signature Below  pitting

## 2024-09-13 NOTE — PATIENT PROFILE ADULT. - ATTEMPT TO OOB
Bed/Stretcher in lowest position, wheels locked, appropriate side rails in place/Call bell, personal items and telephone in reach/Instruct patient to call for assistance before getting out of bed/chair/stretcher/Non-slip footwear applied when patient is off stretcher/[place] to call system/Physically safe environment - no spills, clutter or unnecessary equipment/Purposeful proactive rounding/Room/bathroom lighting operational, light cord in reach
no

## 2024-10-03 NOTE — PATIENT PROFILE ADULT - NSPROPOAPRESSUREINJURY_GEN_A_NUR
10/3/2024      Eliud Henao  [address]  [facility] [address]      Dear Colleague,    Thank you for referring your patient, Eliud Henao, to the [facility]. Please see a copy of my visit note below.    Subjective:    Pt is seen today in consult from Dr. Trevino w/ the c/c of a painful ingrown right great nail both border.  This has been problematic for at least one year. positivehistory of drainage from the site. This is slowly getting worse.  Aggravated by activity and relieved by rest.  Has tried soaking which has not helped.   Recently placed on antibiotic.  Patient states that he picks at his nail.  Works at a job mostly sitting down.  States before recent bout with ingrown nail last time he had this was approximately 10 years ago.  Denies fever and chill, denies numbness and tingling, denies erythema on dorsum of foot.     ROS:  see above    Past Medical History:   Diagnosis Date     SVT (supraventricular tachycardia) (H)        Past Surgical History:   Procedure Laterality Date     CARDIAC ELECTROPHYSIOLOGY MAPPING AND ABLATION       RADIOFREQUENCY ABLATION      for SVT       Family History   Problem Relation Age of Onset     Diabetes Sister      Insulin Resistance Brother      Hypertension Mother        Social History     Tobacco Use     Smoking status: Never     Smokeless tobacco: Never   Substance Use Topics     Alcohol use: Not on file         Current Outpatient Medications:      IBUPROFEN ORAL, [IBUPROFEN ORAL] Take by mouth., Disp: , Rfl:      selenium sulfide (SELSUN) 2.5 % external lotion, [SELENIUM SULFIDE (SELSUN) 2.5 % LOTION] Apply to rash as directed nightly for 1 week, wash off in the AM, Disp: 118 mL, Rfl: 3     No Known Allergies    Pulse 80   SpO2 100% .      Constitutional/ general:  Pt is in no apparent distress, appears well-nourished.  Cooperative with history and physical exam.     Psych:  The patient answered questions appropriately.  Normal affect.  Seems  to have reasonable expectations, in terms of treatment.     Lungs:  Non labored breathing, non labored speech. No cough.  No audible wheezing. Even, quiet breathing.       Vascular:  Pedal pulses are palpable bilaterally for both the DP and PT arteries.  CFT < 3 sec.  No ankle varicosities or edema.  Pedal hair growth noted.     Neuro:  Alert and oriented x 3. Coordinated gait.  Light touch sensation is intact     Derm: Normal texture and turgor.  No ecchymosis, or cyanosis.  Hair growth noted.        Musculoskeletal:     Patient is ambulatory without an assistive device or brace.   right great toe nail both border shows soft tissue impingement with localized erythema.   positive active drainage/purulence at this time.  No sinus tracts.  No nailbed masses or exostosis.  No pain with range of motion of IPJ or MTPJ.  No ascending cellulitis.    ASSESSMENT:    Onychocryptosis with paronychia right toe.    Discussed etiology and treatment options in detail w/ the pt.  The potential causes and nature of an ingrown toenail were discussed with the patient.  We reviewed the natural history/prognosis of the condition and potential risks if no treatment is provided.      After thorough discussion and answering all questions, the patient elected to have nail avulsion.  Obtained consent, used 3cc of 1% lidocaine plain to block right first toe.  Sterile prep, then avulsed the affected border(s).  No evidence of deep abscess noted.  Pt tolerated procedure well.  Sterile bandage placed, gave wound care instruction.  Instructed patient on trimming nails correctly.  Patient will stop picking and nail.  They will avoid tight shoes.  Avoid pedicures.  Discussed permanent removal of border if chronic problem.  Return to clinic prn.  Thank you for allowing me participate in the care of this patient.          Bijan Farrell, SARAH, FACFAS        Again, thank you for allowing me to participate in the care of your patient.         Sincerely,        Bijan Farrell, SARAH   no

## 2024-11-24 NOTE — PATIENT PROFILE ADULT. - FUNCTIONAL LEVEL PRIOR: EATING
Acute Care - Speech Language Pathology   Swallow Initial Evaluation [facility]     Patient Name: Jenna Russell  : 1942  MRN: [id number]  Today's Date: 2024               Admit Date: 2024    Visit Dx:     ICD-10-CM ICD-9-CM   1. Expressive aphasia  R47.01 784.3   2. Acute right-sided weakness  R53.1 728.87   3. Confusion  R41.0 298.9   4. Right-sided visual neglect  R41.4 781.8     Patient Active Problem List   Diagnosis    PAF (paroxysmal atrial fibrillation)    HLD (hyperlipidemia)    Hypertension    History of CVA (cerebrovascular accident)    Combined receptive and expressive aphasia    Colitis, Clostridium difficile    Collagenous colitis    Seizure disorder    Closed right hip fracture, initial encounter    Anemia    Daily consumption of alcohol    Complex partial seizure    Right sided weakness     Past Medical History:   Diagnosis Date    Acute ischemic stroke     Arterial ischemic stroke 2016    Ataxic aphasia 2016    BP (high blood pressure) 2016    Cerebral infarction, left hemisphere 2016    Cyst of left ovary 2016    3cm    Expressive aphasia     H/O echocardiogram 2015    Global LV wall motion and contractility within normal limits. Normal LVSF.Normal LV diastolic filling. Left atrium midly dilated. RV mild- mod dilated. MIld aortic cusp sclerosis. No evidence of mitral valve prolapse. Mild mitral regurgitation.No pulmonary hypertension or perdicardial effusion. No dilation of aortic root. Venous system appears normal    H/O: stroke     HLD (hyperlipidemia) 2016    Left temporal lobe infarction     LOM (loss of memory)     Osteoarthritis 2016    Paroxysmal atrial fibrillation 2016    Thyroid nodule 2016    1.8x1.2cm    Weakness generalized     Wound infection after surgery     Left hip arthroplasty, staph aureus, 6 weeks IV Rocephin     Past Surgical History:   Procedure Laterality Date    ABDOMINOPLASTY      BREAST ABSCESS INCISION AND  DRAINAGE      HIP TROCHANTERIC NAILING WITH INTRAMEDULLARY HIP SCREW Right 3/11/2022    Procedure: HIP TROCHANTERIC NAILING WITH INTRAMEDULLARY HIP SCREW RIGHT;  Surgeon: Danny Lynne MD;  Location: [facility];  Service: Orthopedics;  Laterality: Right;    TOTAL HIP ARTHROPLASTY Left     Staph aureus wound infection       SLP Recommendation and Plan  SLP Swallowing Diagnosis: swallow WFL/no suspected pharyngeal impairment (11/24/24 0930)  SLP Diet Recommendation: regular textures, thin liquids (11/24/24 0930)     SLP Rec. for Method of Medication Administration: meds whole, with thin liquids (11/24/24 0930)     Monitor for Signs of Aspiration: yes, notify SLP if any concerns (11/24/24 0930)     Swallow Criteria for Skilled Therapeutic Interventions Met: no problems identified which require skilled intervention (11/24/24 0930)  Anticipated Discharge Disposition (SLP): home with home health (11/24/24 0930)     Therapy Frequency (Swallow): evaluation only (11/24/24 0930)     Oral Care Recommendations: Oral Care BID/PRN, Toothbrush (11/24/24 0930)                                        Progress: improving      SWALLOW EVALUATION (Last 72 Hours)       SLP Adult Swallow Evaluation       Row Name 11/24/24 0930                   Rehab Evaluation    Document Type evaluation  -HG        Subjective Information no complaints  -HG        Patient Observations alert;cooperative;agree to therapy  -HG        Patient/Family/Caregiver Comments/Observations Family present  -HG        Patient Effort excellent  -HG        Symptoms Noted During/After Treatment none  -HG        Oral Care teeth brushed - suction toothbrush;tongue brushed  -HG           General Information    Patient Profile Reviewed yes  -HG        Pertinent History Of Current Problem Pt is an 81 year old female admitted with right sided weakness this morning. After undergoing CT scans in ED she had rhythmic right arm twitching c/w complex partial seizure. Pt with a  prior left MCA stroke in 2015 with some residual aphasia, intraparenchymal and subarachnoid hemorrhage in the region of the old stroke (while on Eliquis), and seizure disorder. Pt admitted stroke pathway. MRI is negative for new acute findings.  -HG        Current Method of Nutrition NPO  -HG        Precautions/Limitations, Vision WFL with corrective lenses;other (see comments)  glasses not present for eval, bold font was legible.  -HG        Precautions/Limitations, Hearing hearing impairment, bilaterally;other (see comments)  pt without hearing aides  -HG        Prior Level of Function-Communication receptive language impairment;expressive language impairment  -HG        Prior Level of Function-Swallowing no diet consistency restrictions  -HG        Plans/Goals Discussed with patient and family  -HG        Barriers to Rehab none identified  -HG        Patient's Goals for Discharge return home;return to all previous roles/activities;return to regular diet  -HG           Pain    Pretreatment Pain Rating 0/10 - no pain  -HG           Oral Motor Structure and Function    Dentition Assessment natural, present and adequate  -HG        Secretion Management WNL/WFL  -HG        Mucosal Quality moist, healthy  -HG        Volitional Swallow WFL  -HG           Oral Musculature and Cranial Nerve Assessment    Oral Motor General Assessment WFL  -HG        Oral Motor, Comment Incoordination due to decreased ability to follow directions.  -HG           General Eating/Swallowing Observations    Respiratory Support Currently in Use room air  -HG        Eating/Swallowing Skills self-fed  -HG        Positioning During Eating upright in bed  -HG        Utensils Used spoon;cup;straw  -HG        Consistencies Trialed ice chips;thin liquids;pureed;soft to chew textures;regular textures  -HG           Respiratory    Respiratory Status WFL;room air  -HG           Clinical Swallow Eval    Oral Prep Phase WFL  -HG        Oral Transit WFL   -HG        Oral Residue WFL  -HG        Pharyngeal Phase no overt signs/symptoms of pharyngeal impairment  -HG        Esophageal Phase unremarkable  -        Clinical Swallow Evaluation Summary CSE completed this date. Pt tolerated all consistencies trialed with no s/sx of aspiration: ice chip, water via tsp, water via cup and straw, applesauce, softened efrain cracker and regular efrain cracker. SLP RECs regular diet and thin liquids. Meds whole with thin.  -           SLP Evaluation Clinical Impression    SLP Swallowing Diagnosis swallow WFL/no suspected pharyngeal impairment  -        Swallow Criteria for Skilled Therapeutic Interventions Met no problems identified which require skilled intervention  -           SLP Treatment Clinical Impressions    Care Plan Review evaluation/treatment results reviewed  -           Recommendations    Therapy Frequency (Swallow) evaluation only  -        SLP Diet Recommendation regular textures;thin liquids  -        Oral Care Recommendations Oral Care BID/PRN;Toothbrush  -        SLP Rec. for Method of Medication Administration meds whole;with thin liquids  -        Monitor for Signs of Aspiration yes;notify SLP if any concerns  -        Anticipated Discharge Disposition (SLP) home with home health  -                  User Key  (r) = Recorded By, (t) = Taken By, (c) = Cosigned By      Initials Name Effective Dates     Alyssa Aguillon, MS CCC-SLP 06/16/21 -                     EDUCATION  The patient has been educated in the following areas:   Cognitive Impairment Communication Impairment.        SLP GOALS       Row Name 11/24/24 3239             Patient will demonstrate functional language skills for return to discharge environment     [place] with minimal cues  -      Time frame 1 week  -         Words/Phrases/Sentences Goal 1 (SLP)    Improve Ability to Comprehend Words/Phrases/Sentences Through: Goal 1 (SLP) identify pictures, field  of;identify body part;identify familiar objects;90%;with minimal cues (75-90%)  -HG      Time Frame (Identify Objects and Pictures Goal 1, SLP) short term goal (STG)  -HG         Follow Directions Goal 2 (SLP)    Improve Ability to Follow Directions Goal 1 (SLP) 1 step direction with objects;2 step commands;90%;with minimal cues (75-90%)  -HG      Time Frame (Follow Directions Goal 1, SLP) short term goal (STG)  -HG         Comprehension at Phrase and Sentence Level Goal 1 (SLP)    Improve Reading Comprehension at Phrase and Sentence Level Goal 1 (SLP) select written word to complete phrase/sentence;answer simple written y/n questions;answer complex written y/n questions;follow simple written directions;90%;with minimal cues (75-90%)  -HG      Time Frame (Reading Comprehension at Phrase and Sentence Level Goal 1, SLP) short term goal (STG)  -HG         Word Retrieval Skills Goal 1 (SLP)    Improve Word Retrieval Skills By Goal 1 (SLP) confrontational naming task;high frequency;responsive naming task;simple;repeating words;repeating phrases;2-5 words;6-10 words;answer WH question with one word;completing a divergent task;90%;with minimal cues (75-90%)  -HG      Time Frame (Word Retrieval Goal 1, SLP) short term goal (STG)  -HG         Orientation Goal 1 (SLP)    Improve Orientation Through Goal 1 (SLP) demonstrating orientation to day;demonstrating orientation to month;demonstrating orientation to year;demonstrating orientation to place;90%;with minimal cues (75-90%)  -      Time Frame (Orientation Goal 1, SLP) short term goal (STG)  -HG                User Key  (r) = Recorded By, (t) = Taken By, (c) = Cosigned By      Initials Name Provider Type     Alyssa Aguillon MS CCC-SLP Speech and Language Pathologist                         Time Calculation:    Time Calculation- SLP       Row Name 11/24/24 7788             Time Calculation- SLP    SLP Start Time 0930  -      SLP Received On 11/24/24  -          Untimed Charges    85066-GU Eval Speech and Production w/ Language Minutes 60  -HG      82763-IM Eval Oral Pharyng Swallow Minutes 30  -HG         Total Minutes    Untimed Charges Total Minutes 90  -HG       Total Minutes 90  -HG                User Key  (r) = Recorded By, (t) = Taken By, (c) = Cosigned By      Initials Name Provider Type    Alyssa Lucia, MS CCC-SLP Speech and Language Pathologist                             Alyssa Aguillon MS CCC-SLP  2024   and [facility] - Speech Language Pathology Initial Evaluation  [facility]     Patient Name: Jenna Russell  : 1942  MRN: [id number]  Today's Date: 2024               Admit Date: 2024     Visit Dx:    ICD-10-CM ICD-9-CM   1. Expressive aphasia  R47.01 784.3   2. Acute right-sided weakness  R53.1 728.87   3. Confusion  R41.0 298.9   4. Right-sided visual neglect  R41.4 781.8     Patient Active Problem List   Diagnosis    PAF (paroxysmal atrial fibrillation)    HLD (hyperlipidemia)    Hypertension    History of CVA (cerebrovascular accident)    Combined receptive and expressive aphasia    Colitis, Clostridium difficile    Collagenous colitis    Seizure disorder    Closed right hip fracture, initial encounter    Anemia    Daily consumption of alcohol    Complex partial seizure    Right sided weakness     Past Medical History:   Diagnosis Date    Acute ischemic stroke     Arterial ischemic stroke 2016    Ataxic aphasia 2016    BP (high blood pressure) 2016    Cerebral infarction, left hemisphere 2016    Cyst of left ovary 2016    3cm    Expressive aphasia     H/O echocardiogram 2015    Global LV wall motion and contractility within normal limits. Normal LVSF.Normal LV diastolic filling. Left atrium midly dilated. RV mild- mod dilated. MIld aortic cusp sclerosis. No evidence of mitral valve prolapse. Mild mitral regurgitation.No pulmonary hypertension or perdicardial effusion. No dilation of aortic  root. Venous system appears normal    H/O: stroke     HLD (hyperlipidemia) 9/8/2016    Left temporal lobe infarction     LOM (loss of memory)     Osteoarthritis 9/8/2016    Paroxysmal atrial fibrillation 9/8/2016    Thyroid nodule 9/8/2016    1.8x1.2cm    Weakness generalized     Wound infection after surgery     Left hip arthroplasty, staph aureus, 6 weeks IV Rocephin     Past Surgical History:   Procedure Laterality Date    ABDOMINOPLASTY      BREAST ABSCESS INCISION AND DRAINAGE      HIP TROCHANTERIC NAILING WITH INTRAMEDULLARY HIP SCREW Right 3/11/2022    Procedure: HIP TROCHANTERIC NAILING WITH INTRAMEDULLARY HIP SCREW RIGHT;  Surgeon: Danny Lynne MD;  Location: [facility];  Service: Orthopedics;  Laterality: Right;    TOTAL HIP ARTHROPLASTY Left     Staph aureus wound infection       SLP Recommendation and Plan  SLP Diagnosis: moderate, aphasia (receptive and expressive) (11/24/24 0930)        Monitor for Signs of Aspiration: yes, notify SLP if any concerns (11/24/24 0930)  Swallow Criteria for Skilled Therapeutic Interventions Met: no problems identified which require skilled intervention (11/24/24 0930)     Anticipated Discharge Disposition (SLP): home with home health (11/24/24 0930)     Therapy Frequency (Swallow): evaluation only (11/24/24 0930)        Oral Care Recommendations: Oral Care BID/PRN, Toothbrush (11/24/24 0930)                          Progress: improving (11/24/24 1055)      SLP EVALUATION (Last 72 Hours)       SLP SLC Evaluation       Row Name 11/24/24 0930                   General Information    Patient Level of Education Pt is a retired   -        Prior Level of Function-Communication receptive language impairment;expressive language impairment  -        Patient's Goals for Discharge return to home;take care of myself at home;return to all previous roles/activities  -           Auditory Comprehension Assessment/Intervention    Auditory Comprehension (Communication)  moderate impairment;severe impairment  -HG        Able to Identify Objects/Pictures (Communication) body part;moderate impairment  -HG        Answers Questions (Communication) yes/no;simple;moderate impairment  -HG        Able to Follow Commands (Communication) 1-step;body part;moderate impairment;severe impairment  -HG        Auditory Comprehension Communication, Comment Family notes a decrease in comprehension abilties.  -HG           Reading Comprehension Assessment/Intervention    Reading Comprehension (Communication) moderate impairment  -HG        Scanning (Reading) sentences;mild impairment;moderate impairment  -HG        Visual Matching Ability (Reading) picture/word;moderate impairment  -HG        Reading Comprehension, Comment Family notes that reading is difficult at baseline.  -HG           Verbal Expression Assessment/Intervention    Automatic Speech (Communication) counting 1-20;WFL  -HG        Repetition words;mild impairment  -HG        Responsive Naming simple;moderate impairment;severe impairment  -HG        Confrontational Naming high frequency;moderate impairment;severe impairment;phonemic paraphasias  -HG        Spontaneous/Functional Words moderate impairment  -HG           Graphic Expression Assessment/Intervention    Graphic Expression, Comment Graphic expression is difficult at baseline and family notes a change in written name.  -HG           Motor Speech Assessment/Intervention    Motor Speech Function mild impairment  -HG        Characteristics Consistent with Dysarthria slurred speech  -HG        Motor Speech, Comment Baseline mild slurred speech  -HG           Cursory Voice Assessment/Intervention    Quality and Resonance (Voice) WFL  -HG           Cognitive Assessment Intervention- SLP    Orientation Status (Cognition) severe impairment  -HG        Thought Organization (Cognitive) concrete divergent;severe impairment  -HG        Cognition, Comment Not assessed this date. Pt was getting  tired and family sensed some frustration with herself of her performance.  -HG           SLP Evaluation Clinical Impressions    SLP Diagnosis moderate;aphasia  receptive and expressive  -HG           Patient will demonstrate functional language skills for return to discharge environment     [place] with minimal cues  -HG        Time frame 1 week  -HG           Words/Phrases/Sentences Goal 1 (SLP)    Improve Ability to Comprehend Words/Phrases/Sentences Through: Goal 1 (SLP) identify pictures, field of;identify body part;identify familiar objects;90%;with minimal cues (75-90%)  -HG        Time Frame (Identify Objects and Pictures Goal 1, SLP) short term goal (STG)  -HG           Follow Directions Goal 2 (SLP)    Improve Ability to Follow Directions Goal 1 (SLP) 1 step direction with objects;2 step commands;90%;with minimal cues (75-90%)  -HG        Time Frame (Follow Directions Goal 1, SLP) short term goal (STG)  -HG           Comprehension at Phrase and Sentence Level Goal 1 (SLP)    Improve Reading Comprehension at Phrase and Sentence Level Goal 1 (SLP) select written word to complete phrase/sentence;answer simple written y/n questions;answer complex written y/n questions;follow simple written directions;90%;with minimal cues (75-90%)  -HG        Time Frame (Reading Comprehension at Phrase and Sentence Level Goal 1, SLP) short term goal (STG)  -HG           Word Retrieval Skills Goal 1 (SLP)    Improve Word Retrieval Skills By Goal 1 (SLP) confrontational naming task;high frequency;responsive naming task;simple;repeating words;repeating phrases;2-5 words;6-10 words;answer WH question with one word;completing a divergent task;90%;with minimal cues (75-90%)  -HG        Time Frame (Word Retrieval Goal 1, SLP) short term goal (STG)  -HG           Orientation Goal 1 (SLP)    Improve Orientation Through Goal 1 (SLP) demonstrating orientation to day;demonstrating orientation to month;demonstrating orientation to  year;demonstrating orientation to place;90%;with minimal cues (75-90%)  -        Time Frame (Orientation Goal 1, SLP) short term goal (STG)  -HG                  User Key  (r) = Recorded By, (t) = Taken By, (c) = Cosigned By      Initials Name Effective Dates    HG Alyssa Aguillon, MS CCC-SLP 06/16/21 -                        EDUCATION  The patient has been educated in the following areas:     Dysphagia (Swallowing Impairment).           SLP GOALS       Row Name 11/24/24 9289             Patient will demonstrate functional language skills for return to discharge environment     [place] with minimal cues  -HG      Time frame 1 week  -HG         Words/Phrases/Sentences Goal 1 (SLP)    Improve Ability to Comprehend Words/Phrases/Sentences Through: Goal 1 (SLP) identify pictures, field of;identify body part;identify familiar objects;90%;with minimal cues (75-90%)  -HG      Time Frame (Identify Objects and Pictures Goal 1, SLP) short term goal (STG)  -HG         Follow Directions Goal 2 (SLP)    Improve Ability to Follow Directions Goal 1 (SLP) 1 step direction with objects;2 step commands;90%;with minimal cues (75-90%)  -HG      Time Frame (Follow Directions Goal 1, SLP) short term goal (STG)  -HG         Comprehension at Phrase and Sentence Level Goal 1 (SLP)    Improve Reading Comprehension at Phrase and Sentence Level Goal 1 (SLP) select written word to complete phrase/sentence;answer simple written y/n questions;answer complex written y/n questions;follow simple written directions;90%;with minimal cues (75-90%)  -HG      Time Frame (Reading Comprehension at Phrase and Sentence Level Goal 1, SLP) short term goal (STG)  -HG         Word Retrieval Skills Goal 1 (SLP)    Improve Word Retrieval Skills By Goal 1 (SLP) confrontational naming task;high frequency;responsive naming task;simple;repeating words;repeating phrases;2-5 words;6-10 words;answer WH question with one word;completing a divergent task;90%;with  minimal cues (75-90%)  -HG      Time Frame (Word Retrieval Goal 1, SLP) short term goal (STG)  -HG         Orientation Goal 1 (SLP)    Improve Orientation Through Goal 1 (SLP) demonstrating orientation to day;demonstrating orientation to month;demonstrating orientation to year;demonstrating orientation to place;90%;with minimal cues (75-90%)  -HG      Time Frame (Orientation Goal 1, SLP) short term goal (STG)  -HG                User Key  (r) = Recorded By, (t) = Taken By, (c) = Cosigned By      Initials Name Provider Type    Alyssa Lucia MS CCC-SLP Speech and Language Pathologist                              Time Calculation:      Time Calculation- SLP       Row Name 11/24/24 1055             Time Calculation- SLP    SLP Start Time 0930  -HG      SLP Received On 11/24/24  -HG         Untimed Charges    02992-PM Eval Speech and Production w/ Language Minutes 60  -HG      15342-XZ Eval Oral Pharyng Swallow Minutes 30  -HG         Total Minutes    Untimed Charges Total Minutes 90  -HG       Total Minutes 90  -HG                User Key  (r) = Recorded By, (t) = Taken By, (c) = Cosigned By      Initials Name Provider Type    Alyssa Lucia MS CCC-SLP Speech and Language Pathologist                                   Alyssa Aguillon MS CCC-IVETH  11/24/2024   (0) independent/NPO

## 2024-12-18 NOTE — DISCHARGE NOTE NURSING/CASE MANAGEMENT/SOCIAL WORK - NSPROEXTENSIONSOFSELF_GEN_A_NUR
ACS protocol  Tele monitoring  Cardio follow up  Echo noted  cont meds dentures/eyeglasses/hearing aid ACS protocol   DC Tele monitoring  Cardio follow up  Echo noted  cont meds

## 2025-01-17 NOTE — ED ADULT NURSE NOTE - CARDIO WDL
ANESTHESIA  -For the first 24 hours after surgery:  Do not drive, use heavy equipment, make important decisions, or drink alcohol  -It is normal to feel sleepy for several hours.  Rest until you are more awake.  -Have someone stay with you, if needed.  They can watch for problems and help keep you safe.  -Some possible post anesthesia side effects include: nausea and vomiting, sore throat and hoarseness, sleepiness, and dizziness.    PAIN  -If you have pain after surgery, pain medicine will help you feel better.  Take it as directed, before pain becomes severe.  Most pain relievers taken by mouth need at least 20-30 minutes to start working.  -Do not drive or drink alcohol while taking pain medicine.  -Pain medication can upset your stomach.  Taking them with a little food may help.  -Other ways to help control pain: elevation, ice, and relaxation  -Call your surgeon if still having unmanageable pain an hour after taking pain medicine.  -Pain medicine can cause constipation.  Taking an over-the counter stool softener while on prescription pain medicine and drinking plenty of fluids can prevent this side effect.  -Call your surgeon if you have severe side effects like: breathing problems, trouble waking up, dizziness, confusion, or severe constipation.    NAUSEA  -Some people have nausea after surgery.  This is often because of anesthesia, pain, pain medicine, or the stress of surgery.  -Do not push yourself to eat.  Start off with clear liquids and soup.  Slowly move to solid foods.  Don't eat fatty, rich, spicy foods at first.  Eat smaller amounts.  -If you develop persistent nausea and vomiting please notify your surgeon immediately.    BLEEDING  -Different types of surgery require different types of care and dressing changes.  It is important to follow all instructions and advice from your surgeon.  Change dressing as directed.  Call your surgeon for any concerns regarding postop bleeding.    SIGNS OF  INFECTION  -Signs of infection include: fever, swelling, drainage, and redness  -Notify your surgeon if you have a fever of 100.4 F (38.0 C) or higher.  -Notify your surgeon if you notice redness, swelling, increased pain, pus, or a foul smell at the incision site.    Normal rate, regular rhythm, normal S1, S2 heart sounds heard.

## 2025-02-10 NOTE — ED PROVIDER NOTE - DISPOSITION TYPE
DERMATOLOGY PROGRESS NOTE  HISTORY OF PRESENT ILLNESS:  Kayla Fish is a 16 year old female new patient who presents for the following cutaneous concerns:    Acne  Location: Face  Onset: 3-4 years   Duration: Seems to be getting better over time.  Denies any painful deep acne or itchiness  Triggers: n/a Break outs are spontaneous   Treatment:     Previous: Benzaclin cream and doxycycline    Current: Brie deep cleanser and Drunk elephant moisturizer        PAST DERMATOLOGIC HISTORY:   No specialty comments available.    No intolerance to Lidocaine or Epinephrine    FAMILY HISTORY:   History of skin cancer--Melanoma (maternal Grandmother)    SOCIAL HISTORY:   Occupation: [employer] and [employer]. 11th Grade in [place]  History of tanning bed use: Yes  Current use of SPF: Yes    PHYSICAL EXAM:  Visit Vitals  LMP 11/16/2024 (Approximate)        Examination of the scalp/body hair, face, neck, ears, eyelids/conjunctiva and lips/oral mucosa was performed   Unable to assess Not applicable has some make up on today  (Physical exam findings noted in A/P)    ASSESSMENT PLAN:  Acne, nodulocystic with scarring (numerous inflammatory papules and nodules on the face, chest, and back with erythematous scarring)  - Failed various medications including doxycycline, Differin, clindamycin/BPO  - After thorough discussion regarding treatment options, pt elects to start isotretinoin. Counseled on need for continual monitoring while on therapy, advised cannot miss appointments and therapy will stop if non-compliant. He is aware that he cannot share the medication or donate blood while on isotretinoin.  - Discontinue doxycycline because of risk of pseudotumor cerebri and all other acne medications.  - Until she starts isotretinoin, start tretinoin 0.025% cream at bedtime. Will discontinue at follow up in 1 month.  - Side effects thoroughly discussed including dry skin/mucous membranes, elevated LFTs and TG, HA, dizziness, decreased  night vision, depression, alopecia, arthralgia, myalgia, IBD.  - No risk of depression, no FHx IBD.  - IPLEDGE booklet reviewed and consent signed; Registered in IPLEDGE #[id number]   - Labs ordered: CMP, CBC, and lipid panel, serum pregnancy  - She will need second negative pregnancy test in 30 days. If negative, will start isotretinoin 30 mg daily and titrate up for a 120-150 mg/kg course (goal cumulative dose 7073-8572 mg)     Ipledge #: [id number]  Birth control: Abstinence    PROCEDURES PERFORMED:  Orders Placed This Encounter    CBC with Automated Differential    Comprehensive Metabolic Panel    Lipid Panel With Reflex    Pregnancy Test, Serum Qualitative    tretinoin (RETIN-A) 0.025 % cream     Sig: Apply topically nightly.     Dispense:  45 g     Refill:  1     Return in about 30 days (around 3/12/2025).    Call sooner if any problems or concerns.    Thank you Peg Chaney MD for allowing us to participate in the care of your patient.      I, Marlon Keating CMA, attest that I performed the duties of a scribe for this encounter in the presence of Dr. Chaney and the patient.     The documentation recorded by the scribe accurately and completely reflects the service(s) I personally performed and the decisions made by me.     Peg Chaney MD  [facility]     ADMIT

## 2025-02-13 NOTE — PATIENT PROFILE ADULT. - ARE SIGNIFICANT INDICATORS COMPLETE.
[facility] MEDICINE PROGRESS NOTE   Patient: Francoise Madrigal  Today's Date: 2025    YOB: 1950  Admission Date: 2025    MRN: [id number]  Inpatient LOS: 0    Room: 104/1  Hospital Day: Hospital Day: 2    Subjective   HISTORY AND SUBJECTIVE COMPLAINTS     Chief Complaint:   Shortness of breath    Interval History / Subjective:   Patient states her breathing is okay at rest.  She states she was able to ambulate to the bathroom and back without issues.  She has been weaned down to 2L nasal cannula, satting 88 to 90%.  She is requesting referral to pulmonology on discharge.  She states she only has an inhaled corticosteroid at home due to the exorbitant cost of a combined ICS-LABA inhaler.    Hospital Course:  Francosie Madrigal is a 75 year old female with history of COPD, current smoker, GERD, HLD, and arthritis who presented on 2025 with shortness of breath.  Patient reports worsening dyspnea for 5 days prior to presentation.  She tested positive for influenza A in the ED.  She was admitted for a COPD exacerbation requiring supplemental oxygen.  ROS:  Pertinent systems negative except as above.    Objective   PHYSICAL EXAMINATION     Vital 24 Hour Range Most Recent Value   Temperature Temp  Min: 98.4 °F (36.9 °C)  Max: 99.2 °F (37.3 °C) 98.5 °F (36.9 °C)   Pulse Pulse  Min: 85  Max: 101 85   Respiratory Resp  Min: 18  Max: 24 (!) 22   Blood Pressure BP  Min: 105/59  Max: 129/56 110/55   Pulse Oximetry SpO2  Min: 91 %  Max: 98 % 95 %   Arterial BP No data recorded     O2 O2 Flow Rate (L/min)  Av.8 L/min  Min: 2 L/min   Min taken time: 25 0759  Max: 4 L/min   Max taken time: 25 1725       Recorded Intake and Output:  Intake/Output Summary (Last 24 hours) at 2025 0839  Last data filed at 2025 0516  Gross per 24 hour   Intake 420 ml   Output 750 ml   Net -330 ml      Recorded Last Stool Occurrence:       Vital Most Recent Value First Value    Weight 95.6 kg (210 lb 12.2 oz) Weight: 95.1 kg (209 lb 10.5 oz)   Height 5' (152.4 cm) Height: 5' 1.5\" (156.2 cm)   BMI 41.16 N/A     GEN: Alert, oriented x 3.  No acute distress.  HEENT: EOMI, oral mucous membranes moist.  CV: Tachycardic, regular rhythm.  CHEST: Diffuse expiratory wheezing, normal respiratory effort.  ABDOMEN: Soft, non-tender, non-distended.  EXT: No edema, cyanosis or clubbing.  SKIN: Warm and dry. No rashes.    TEST RESULTS     Labs: The Laboratory values listed below have been reviewed and pertinent findings discussed in the Assessment and Plan.    Laboratory values:   Recent Labs   Lab 02/13/25  0624 02/12/25  1012   WBC 10.2 7.9   HGB 13.8 14.9   HCT 42.6 45.7    271         Recent Labs   Lab 02/13/25  0624 02/12/25  1012   SODIUM 141 142   POTASSIUM 4.0 4.4   CHLORIDE 105 107   CO2 26 29   CALCIUM 8.5 8.8   GLUCOSE 131* 113*   BUN 21* 10   CREATININE 0.95 1.02*   MG 2.4  --           Radiology: Imaging studies have been reviewed and pertinent findings discussed in the Assessment and Plan.  Results for orders placed or performed during the hospital encounter of 02/12/25 (from the past 48 hour(s))   XR CHEST PA OR AP 1 VIEW    Impression    IMPRESSION:    1.  No focal consolidation.  2.  Interstitial prominence of the lung bases.      Electronically Signed by: Joselo Pisano DO  Signed on: 2/12/2025 10:18 AM  Created on Workstation ID: [id number]  Signed on Workstation ID: [id number]        ANCILLARY ORDERS     Diet:  Regular; Yes, Medical Nutrition Management by Rd (Registered Dietitian) Diet  Telemetry: On  Consults:    None  Therapy Orders:   No orders of the defined types were placed in this encounter.      Advance Care Planning    ADVANCED DIRECTIVES     Code Status: Full Resuscitation        ASSESSMENT AND PLAN     Acute hypoxic respiratory failure  COPD exacerbation  Influenza A infection  - Currently requiring supplemental O2, wean as tolerated to goal O2>88%  - Continue  prednisone 40 mg daily for 5 days total  - Continue azithromycin 500 mg daily for 3 days total  - Continue Tamiflu for total 5-day course  - Continue DuoNebs every 4 hours  - Switched home ICS to Symbicort BID  - Incentive spirometry  - Acapella  - Scheduled Mucinex BID  - Counseled patient on smoking cessation  - Patient will need a pulmonary referral at discharge, she does not appear to have had PFTs in the past     GERD  - Continue home PPI     HLD   - Continue home Atorvastatin    Chronic pain   RLS  - Continue home Gabapentin, Ropinirole    Smoking status: current smoker    Nutrition status: appropriate  Body mass index is 41.16 kg/m². - Morbid obesity (BMI >40 or 35+ and a comorbid condition)  DVT Prophylaxis: Lovenox prophylactic dosing (dose adjusted as per renal function)       DISCHARGE PLANNING     The patient's treatment plans were discussed with patient.     Recommendations for Discharge   SW Home   PT     OT     SLP        Anticipated discharge destination: Home  Barriers to Discharge: Patient is not medically ready and needs to remain in the hospital today due to above      Rosalva Norton MD  Hospitalist  2/13/2025  8:39 AM       Yes
